# Patient Record
Sex: MALE | Race: ASIAN | NOT HISPANIC OR LATINO | Employment: OTHER | URBAN - METROPOLITAN AREA
[De-identification: names, ages, dates, MRNs, and addresses within clinical notes are randomized per-mention and may not be internally consistent; named-entity substitution may affect disease eponyms.]

---

## 2018-01-29 ENCOUNTER — OFFICE VISIT CONVERTED (OUTPATIENT)
Dept: CARDIOLOGY | Facility: CLINIC | Age: 73
End: 2018-01-29
Attending: INTERNAL MEDICINE

## 2018-01-29 ENCOUNTER — CONVERSION ENCOUNTER (OUTPATIENT)
Dept: CARDIOLOGY | Facility: CLINIC | Age: 73
End: 2018-01-29

## 2018-08-08 ENCOUNTER — OFFICE VISIT CONVERTED (OUTPATIENT)
Dept: CARDIOLOGY | Facility: CLINIC | Age: 73
End: 2018-08-08
Attending: INTERNAL MEDICINE

## 2019-03-12 ENCOUNTER — HOSPITAL ENCOUNTER (OUTPATIENT)
Dept: LAB | Facility: HOSPITAL | Age: 74
Discharge: HOME | End: 2019-03-12
Attending: INTERNAL MEDICINE

## 2019-03-12 LAB
ANION GAP SERPL CALC-SCNC: 20 MMOL/L (ref 8–19)
APPEARANCE UR: CLEAR
BASOPHILS # BLD AUTO: 0.04 10*3/UL (ref 0–0.2)
BASOPHILS NFR BLD AUTO: 0.6 % (ref 0–3)
BILIRUB UR QL: NEGATIVE
BUN SERPL-MCNC: 44 MG/DL (ref 5–25)
BUN/CREAT SERPL: 29 {RATIO} (ref 6–20)
CALCIUM SERPL-MCNC: 9.3 MG/DL (ref 8.7–10.4)
CHLORIDE SERPL-SCNC: 102 MMOL/L (ref 99–111)
COLOR UR: YELLOW
CONV ABS IMM GRAN: 0.03 10*3/UL (ref 0–0.2)
CONV BACTERIA: NEGATIVE
CONV CO2: 21 MMOL/L (ref 22–32)
CONV COLLECTION SOURCE (UA): ABNORMAL
CONV CREATININE URINE, RANDOM: 133.4 MG/DL (ref 10–300)
CONV HYALINE CASTS IN URINE MICRO: ABNORMAL /[LPF]
CONV IMMATURE GRAN: 0.5 % (ref 0–1.8)
CONV MICROALBUM.,U,RANDOM: 282.3 MG/L (ref 0–20)
CONV UROBILINOGEN IN URINE BY AUTOMATED TEST STRIP: 0.2 {EHRLICHU}/DL (ref 0.1–1)
CREAT UR-MCNC: 1.54 MG/DL (ref 0.7–1.2)
DEPRECATED RDW RBC AUTO: 44.6 FL (ref 35.1–43.9)
EOSINOPHIL # BLD AUTO: 0.22 10*3/UL (ref 0–0.7)
EOSINOPHIL # BLD AUTO: 3.5 % (ref 0–7)
ERYTHROCYTE [DISTWIDTH] IN BLOOD BY AUTOMATED COUNT: 13.8 % (ref 11.6–14.4)
GFR SERPLBLD BASED ON 1.73 SQ M-ARVRAT: 44 ML/MIN/{1.73_M2}
GLUCOSE SERPL-MCNC: 142 MG/DL (ref 70–99)
GLUCOSE UR QL: NEGATIVE MG/DL
HBA1C MFR BLD: 13.7 G/DL (ref 14–18)
HCT VFR BLD AUTO: 44.2 % (ref 42–52)
HGB UR QL STRIP: NEGATIVE
KETONES UR QL STRIP: NEGATIVE MG/DL
LEUKOCYTE ESTERASE UR QL STRIP: NEGATIVE
LYMPHOCYTES # BLD AUTO: 2.36 10*3/UL (ref 1–5)
MCH RBC QN AUTO: 27.7 PG (ref 27–31)
MCHC RBC AUTO-ENTMCNC: 31 G/DL (ref 33–37)
MCV RBC AUTO: 89.3 FL (ref 80–96)
MICROALBUMIN/CREAT UR: 211.6 MG/G{CRE} (ref 0–25)
MONOCYTES # BLD AUTO: 0.59 10*3/UL (ref 0.2–1.2)
MONOCYTES NFR BLD AUTO: 9.4 % (ref 3–10)
NEUTROPHILS # BLD AUTO: 3.07 10*3/UL (ref 2–8)
NEUTROPHILS NFR BLD AUTO: 48.6 % (ref 30–85)
NITRITE UR QL STRIP: NEGATIVE
NRBC CBCN: 0 % (ref 0–0.7)
OSMOLALITY SERPL CALC.SUM OF ELEC: 302 MOSM/KG (ref 273–304)
PH UR STRIP.AUTO: 5 [PH] (ref 5–8)
PLATELET # BLD AUTO: 135 10*3/UL (ref 130–400)
PMV BLD AUTO: 14.1 FL (ref 9.4–12.4)
POTASSIUM SERPL-SCNC: 4.3 MMOL/L (ref 3.5–5.3)
PROT UR QL: 30 MG/DL
RBC # BLD AUTO: 4.95 10*6/UL (ref 4.7–6.1)
RBC #/AREA URNS HPF: ABNORMAL /[HPF]
SODIUM SERPL-SCNC: 139 MMOL/L (ref 135–147)
SP GR UR: 1.02 (ref 1–1.03)
SQUAMOUS SPT QL MICRO: ABNORMAL /[HPF]
T4 FREE SERPL-MCNC: 1.5 NG/DL (ref 0.9–1.8)
TSH SERPL-ACNC: 5 M[IU]/L (ref 0.27–4.2)
URATE SERPL-MCNC: 3 MG/DL (ref 3.5–8.5)
VARIANT LYMPHS NFR BLD MANUAL: 37.4 % (ref 20–45)
WBC # BLD AUTO: 6.31 10*3/UL (ref 4.8–10.8)
WBC #/AREA URNS HPF: ABNORMAL /[HPF]

## 2019-06-17 ENCOUNTER — HOSPITAL ENCOUNTER (OUTPATIENT)
Dept: LAB | Facility: HOSPITAL | Age: 74
Discharge: HOME | End: 2019-06-17
Attending: INTERNAL MEDICINE

## 2019-06-17 LAB
25(OH)D3 SERPL-MCNC: 43.6 NG/ML (ref 30–100)
ALBUMIN SERPL-MCNC: 4.7 G/DL (ref 3.5–5)
ALBUMIN/GLOB SERPL: 1.6 {RATIO} (ref 1.4–2.6)
ALP SERPL-CCNC: 56 U/L (ref 56–155)
ALT SERPL-CCNC: 18 U/L (ref 10–40)
ANION GAP SERPL CALC-SCNC: 18 MMOL/L (ref 8–19)
APPEARANCE UR: CLEAR
AST SERPL-CCNC: 21 U/L (ref 15–50)
BASOPHILS # BLD AUTO: 0.05 10*3/UL (ref 0–0.2)
BASOPHILS NFR BLD AUTO: 1 % (ref 0–3)
BILIRUB SERPL-MCNC: 0.65 MG/DL (ref 0.2–1.3)
BILIRUB UR QL: NEGATIVE
BUN SERPL-MCNC: 30 MG/DL (ref 5–25)
BUN/CREAT SERPL: 23 {RATIO} (ref 6–20)
CALCIUM SERPL-MCNC: 10.1 MG/DL (ref 8.7–10.4)
CHLORIDE SERPL-SCNC: 104 MMOL/L (ref 99–111)
CHOLEST SERPL-MCNC: 105 MG/DL (ref 107–200)
CHOLEST/HDLC SERPL: 3.1 {RATIO} (ref 3–6)
COLOR UR: YELLOW
CONV ABS IMM GRAN: 0.01 10*3/UL (ref 0–0.2)
CONV CO2: 28 MMOL/L (ref 22–32)
CONV COLLECTION SOURCE (UA): NORMAL
CONV CREATININE URINE, RANDOM: 28.2 MG/DL (ref 10–300)
CONV IMMATURE GRAN: 0.2 % (ref 0–1.8)
CONV MICROALBUM.,U,RANDOM: 118.3 MG/L (ref 0–20)
CONV TOTAL PROTEIN: 7.6 G/DL (ref 6.3–8.2)
CONV UROBILINOGEN IN URINE BY AUTOMATED TEST STRIP: 0.2 {EHRLICHU}/DL (ref 0.1–1)
CREAT UR-MCNC: 1.29 MG/DL (ref 0.7–1.2)
DEPRECATED RDW RBC AUTO: 56.5 FL (ref 35.1–43.9)
EOSINOPHIL # BLD AUTO: 0.3 10*3/UL (ref 0–0.7)
EOSINOPHIL # BLD AUTO: 5.9 % (ref 0–7)
ERYTHROCYTE [DISTWIDTH] IN BLOOD BY AUTOMATED COUNT: 16.3 % (ref 11.6–14.4)
EST. AVERAGE GLUCOSE BLD GHB EST-MCNC: 123 MG/DL
GFR SERPLBLD BASED ON 1.73 SQ M-ARVRAT: 54 ML/MIN/{1.73_M2}
GLOBULIN UR ELPH-MCNC: 2.9 G/DL (ref 2–3.5)
GLUCOSE SERPL-MCNC: 181 MG/DL (ref 70–99)
GLUCOSE UR QL: NEGATIVE MG/DL
HBA1C MFR BLD: 11.6 G/DL (ref 14–18)
HBA1C MFR BLD: 5.9 % (ref 3.5–5.7)
HCT VFR BLD AUTO: 39.6 % (ref 42–52)
HDLC SERPL-MCNC: 34 MG/DL (ref 40–60)
HGB UR QL STRIP: NEGATIVE
KETONES UR QL STRIP: NEGATIVE MG/DL
LDLC SERPL CALC-MCNC: 28 MG/DL (ref 70–100)
LEUKOCYTE ESTERASE UR QL STRIP: NEGATIVE
LYMPHOCYTES # BLD AUTO: 1.62 10*3/UL (ref 1–5)
MCH RBC QN AUTO: 27.8 PG (ref 27–31)
MCHC RBC AUTO-ENTMCNC: 29.3 G/DL (ref 33–37)
MCV RBC AUTO: 95 FL (ref 80–96)
MICROALBUMIN/CREAT UR: 419.5 MG/G{CRE} (ref 0–25)
MONOCYTES # BLD AUTO: 0.44 10*3/UL (ref 0.2–1.2)
MONOCYTES NFR BLD AUTO: 8.7 % (ref 3–10)
NEUTROPHILS # BLD AUTO: 2.66 10*3/UL (ref 2–8)
NEUTROPHILS NFR BLD AUTO: 52.3 % (ref 30–85)
NITRITE UR QL STRIP: NEGATIVE
NRBC CBCN: 0 % (ref 0–0.7)
OSMOLALITY SERPL CALC.SUM OF ELEC: 313 MOSM/KG (ref 273–304)
PH UR STRIP.AUTO: 5 [PH] (ref 5–8)
PLATELET # BLD AUTO: 97 10*3/UL (ref 130–400)
PMV BLD AUTO: 13.8 FL (ref 9.4–12.4)
POTASSIUM SERPL-SCNC: 3.8 MMOL/L (ref 3.5–5.3)
PROT UR QL: NEGATIVE MG/DL
RBC # BLD AUTO: 4.17 10*6/UL (ref 4.7–6.1)
SODIUM SERPL-SCNC: 146 MMOL/L (ref 135–147)
SP GR UR: 1.01 (ref 1–1.03)
T4 FREE SERPL-MCNC: 1.2 NG/DL (ref 0.9–1.8)
TRIGL SERPL-MCNC: 213 MG/DL (ref 40–150)
TSH SERPL-ACNC: 3.74 M[IU]/L (ref 0.27–4.2)
URATE SERPL-MCNC: 2.5 MG/DL (ref 3.5–8.5)
VARIANT LYMPHS NFR BLD MANUAL: 31.9 % (ref 20–45)
VLDLC SERPL-MCNC: 43 MG/DL (ref 5–37)
WBC # BLD AUTO: 5.08 10*3/UL (ref 4.8–10.8)

## 2019-06-19 LAB
ALBUMIN SERPL-MCNC: 4.1 G/DL (ref 2.9–4.4)
ALBUMIN/GLOB SERPL: 1.2 {RATIO} (ref 0.7–1.7)
ALPHA2 GLOB SERPL ELPH-MCNC: 0.9 G/DL (ref 0.4–1)
BETA GLOBULIN: 1.1 G/DL (ref 0.7–1.3)
CONV ALPHA-1-GLOBULIN: 0.2 G/DL (ref 0–0.4)
CONV IMMUNOGLOBULIN G (IGG): 1017 MG/DL (ref 700–1600)
CONV IMMUNOGLOBULIN M (IGM): 34 MG/DL (ref 15–143)
CONV PE INTERPRETATION: NORMAL
CONV PE NOTE: NORMAL
CONV TOTAL PROTEIN: 7.5 G/DL (ref 6–8.5)
GAMMA GLOB SERPL ELPH-MCNC: 1.2 G/DL (ref 0.4–1.8)
GLOBULIN UR ELPH-MCNC: 3.4 G/DL (ref 2.2–3.9)
IGA SERPL-MCNC: 169 MG/DL (ref 61–437)
M-SPIKE: NORMAL G/DL
PROT PATTERN SERPL IFE-IMP: NORMAL

## 2019-07-22 ENCOUNTER — HOSPITAL ENCOUNTER (OUTPATIENT)
Dept: LAB | Facility: HOSPITAL | Age: 74
Discharge: HOME | End: 2019-07-22
Attending: INTERNAL MEDICINE

## 2019-07-22 LAB
ALBUMIN SERPL-MCNC: 4.5 G/DL (ref 3.5–5)
ALBUMIN/GLOB SERPL: 1.6 {RATIO} (ref 1.4–2.6)
ALP SERPL-CCNC: 54 U/L (ref 56–155)
ALT SERPL-CCNC: 22 U/L (ref 10–40)
ANION GAP SERPL CALC-SCNC: 18 MMOL/L (ref 8–19)
AST SERPL-CCNC: 26 U/L (ref 15–50)
BASOPHILS # BLD AUTO: 0.04 10*3/UL (ref 0–0.2)
BASOPHILS NFR BLD AUTO: 0.7 % (ref 0–3)
BILIRUB SERPL-MCNC: 0.62 MG/DL (ref 0.2–1.3)
BUN SERPL-MCNC: 18 MG/DL (ref 5–25)
BUN/CREAT SERPL: 13 {RATIO} (ref 6–20)
CALCIUM SERPL-MCNC: 9.5 MG/DL (ref 8.7–10.4)
CHLORIDE SERPL-SCNC: 107 MMOL/L (ref 99–111)
CONV ABS IMM GRAN: 0.02 10*3/UL (ref 0–0.2)
CONV CO2: 22 MMOL/L (ref 22–32)
CONV IMMATURE GRAN: 0.3 % (ref 0–1.8)
CONV TOTAL PROTEIN: 7.3 G/DL (ref 6.3–8.2)
CREAT UR-MCNC: 1.34 MG/DL (ref 0.7–1.2)
DEPRECATED RDW RBC AUTO: 50.1 FL (ref 35.1–43.9)
EOSINOPHIL # BLD AUTO: 0.25 10*3/UL (ref 0–0.7)
EOSINOPHIL # BLD AUTO: 4.2 % (ref 0–7)
ERYTHROCYTE [DISTWIDTH] IN BLOOD BY AUTOMATED COUNT: 15.4 % (ref 11.6–14.4)
GFR SERPLBLD BASED ON 1.73 SQ M-ARVRAT: 52 ML/MIN/{1.73_M2}
GLOBULIN UR ELPH-MCNC: 2.8 G/DL (ref 2–3.5)
GLUCOSE SERPL-MCNC: 84 MG/DL (ref 70–99)
HBA1C MFR BLD: 11.1 G/DL (ref 14–18)
HCT VFR BLD AUTO: 35.5 % (ref 42–52)
LYMPHOCYTES # BLD AUTO: 1.75 10*3/UL (ref 1–5)
MCH RBC QN AUTO: 28.1 PG (ref 27–31)
MCHC RBC AUTO-ENTMCNC: 31.3 G/DL (ref 33–37)
MCV RBC AUTO: 89.9 FL (ref 80–96)
MONOCYTES # BLD AUTO: 0.54 10*3/UL (ref 0.2–1.2)
MONOCYTES NFR BLD AUTO: 9.1 % (ref 3–10)
NEUTROPHILS # BLD AUTO: 3.32 10*3/UL (ref 2–8)
NEUTROPHILS NFR BLD AUTO: 56.1 % (ref 30–85)
NRBC CBCN: 0 % (ref 0–0.7)
OSMOLALITY SERPL CALC.SUM OF ELEC: 297 MOSM/KG (ref 273–304)
PLATELET # BLD AUTO: 94 10*3/UL (ref 130–400)
PMV BLD AUTO: ABNORMAL FL (ref 9.4–12.4)
POTASSIUM SERPL-SCNC: 4.1 MMOL/L (ref 3.5–5.3)
RBC # BLD AUTO: 3.95 10*6/UL (ref 4.7–6.1)
SODIUM SERPL-SCNC: 143 MMOL/L (ref 135–147)
VARIANT LYMPHS NFR BLD MANUAL: 29.6 % (ref 20–45)
WBC # BLD AUTO: 5.92 10*3/UL (ref 4.8–10.8)

## 2019-07-23 LAB
ALBUMIN SERPL-MCNC: 3.8 G/DL (ref 2.9–4.4)
ALBUMIN/GLOB SERPL: 1.2 {RATIO} (ref 0.7–1.7)
ALPHA2 GLOB SERPL ELPH-MCNC: 0.8 G/DL (ref 0.4–1)
BETA GLOBULIN: 1 G/DL (ref 0.7–1.3)
CONV ALPHA-1-GLOBULIN: 0.2 G/DL (ref 0–0.4)
CONV IMMUNOGLOBULIN G (IGG): 1005 MG/DL (ref 700–1600)
CONV IMMUNOGLOBULIN M (IGM): 34 MG/DL (ref 15–143)
CONV PE INTERPRETATION: NORMAL
CONV PE NOTE: NORMAL
CONV TOTAL PROTEIN: 6.9 G/DL (ref 6–8.5)
GAMMA GLOB SERPL ELPH-MCNC: 1.1 G/DL (ref 0.4–1.8)
GLOBULIN UR ELPH-MCNC: 3.1 G/DL (ref 2.2–3.9)
IGA SERPL-MCNC: 166 MG/DL (ref 61–437)
M-SPIKE: NORMAL G/DL
PROT PATTERN SERPL IFE-IMP: NORMAL

## 2019-08-28 ENCOUNTER — OFFICE VISIT CONVERTED (OUTPATIENT)
Dept: CARDIOLOGY | Facility: CLINIC | Age: 74
End: 2019-08-28
Attending: INTERNAL MEDICINE

## 2020-04-29 ENCOUNTER — TELEPHONE CONVERTED (OUTPATIENT)
Dept: GASTROENTEROLOGY | Facility: CLINIC | Age: 75
End: 2020-04-29
Attending: INTERNAL MEDICINE

## 2020-05-22 ENCOUNTER — HOSPITAL ENCOUNTER (OUTPATIENT)
Dept: LAB | Facility: HOSPITAL | Age: 75
Discharge: HOME | End: 2020-05-22
Attending: INTERNAL MEDICINE

## 2020-05-22 LAB
25(OH)D3 SERPL-MCNC: 35.7 NG/ML (ref 30–100)
ALBUMIN SERPL-MCNC: 4.3 G/DL (ref 3.5–5)
ALBUMIN/GLOB SERPL: 1.7 {RATIO} (ref 1.4–2.6)
ALP SERPL-CCNC: 53 U/L (ref 56–155)
ALT SERPL-CCNC: 17 U/L (ref 10–40)
ANION GAP SERPL CALC-SCNC: 16 MMOL/L (ref 8–19)
APPEARANCE UR: CLEAR
AST SERPL-CCNC: 18 U/L (ref 15–50)
BASOPHILS # BLD AUTO: 0.03 10*3/UL (ref 0–0.2)
BASOPHILS NFR BLD AUTO: 0.5 % (ref 0–3)
BILIRUB SERPL-MCNC: 0.57 MG/DL (ref 0.2–1.3)
BILIRUB UR QL: NEGATIVE
BUN SERPL-MCNC: 30 MG/DL (ref 5–25)
BUN/CREAT SERPL: 17 {RATIO} (ref 6–20)
CALCIUM SERPL-MCNC: 9.8 MG/DL (ref 8.7–10.4)
CHLORIDE SERPL-SCNC: 109 MMOL/L (ref 99–111)
CK SERPL-CCNC: 61 U/L (ref 57–374)
COLOR UR: YELLOW
CONV ABS IMM GRAN: 0.03 10*3/UL (ref 0–0.2)
CONV BACTERIA: NEGATIVE
CONV CO2: 21 MMOL/L (ref 22–32)
CONV COLLECTION SOURCE (UA): ABNORMAL
CONV CREATININE URINE, RANDOM: 52 MG/DL (ref 10–300)
CONV HYALINE CASTS IN URINE MICRO: ABNORMAL /[LPF]
CONV IMMATURE GRAN: 0.5 % (ref 0–1.8)
CONV MICROALBUM.,U,RANDOM: 584.9 MG/L (ref 0–20)
CONV TOTAL PROTEIN: 6.9 G/DL (ref 6.3–8.2)
CONV UROBILINOGEN IN URINE BY AUTOMATED TEST STRIP: 0.2 {EHRLICHU}/DL (ref 0.1–1)
CREAT UR-MCNC: 1.76 MG/DL (ref 0.7–1.2)
DEPRECATED RDW RBC AUTO: 50.8 FL (ref 35.1–43.9)
EOSINOPHIL # BLD AUTO: 0.24 10*3/UL (ref 0–0.7)
EOSINOPHIL # BLD AUTO: 4 % (ref 0–7)
ERYTHROCYTE [DISTWIDTH] IN BLOOD BY AUTOMATED COUNT: 14.7 % (ref 11.6–14.4)
EST. AVERAGE GLUCOSE BLD GHB EST-MCNC: 148 MG/DL
GFR SERPLBLD BASED ON 1.73 SQ M-ARVRAT: 37 ML/MIN/{1.73_M2}
GLOBULIN UR ELPH-MCNC: 2.6 G/DL (ref 2–3.5)
GLUCOSE SERPL-MCNC: 95 MG/DL (ref 70–99)
GLUCOSE UR QL: NEGATIVE MG/DL
HBA1C MFR BLD: 6.8 % (ref 3.5–5.7)
HCT VFR BLD AUTO: 39.7 % (ref 42–52)
HGB BLD-MCNC: 12.1 G/DL (ref 14–18)
HGB UR QL STRIP: NEGATIVE
KETONES UR QL STRIP: NEGATIVE MG/DL
LEUKOCYTE ESTERASE UR QL STRIP: NEGATIVE
LYMPHOCYTES # BLD AUTO: 2.51 10*3/UL (ref 1–5)
LYMPHOCYTES NFR BLD AUTO: 41.7 % (ref 20–45)
MCH RBC QN AUTO: 29 PG (ref 27–31)
MCHC RBC AUTO-ENTMCNC: 30.5 G/DL (ref 33–37)
MCV RBC AUTO: 95.2 FL (ref 80–96)
MICROALBUMIN/CREAT UR: 1124.8 MG/G{CRE} (ref 0–25)
MONOCYTES # BLD AUTO: 0.44 10*3/UL (ref 0.2–1.2)
MONOCYTES NFR BLD AUTO: 7.3 % (ref 3–10)
NEUTROPHILS # BLD AUTO: 2.77 10*3/UL (ref 2–8)
NEUTROPHILS NFR BLD AUTO: 46 % (ref 30–85)
NITRITE UR QL STRIP: NEGATIVE
NRBC CBCN: 0 % (ref 0–0.7)
OSMOLALITY SERPL CALC.SUM OF ELEC: 298 MOSM/KG (ref 273–304)
PH UR STRIP.AUTO: 5 [PH] (ref 5–8)
PLATELET # BLD AUTO: 110 10*3/UL (ref 130–400)
PMV BLD AUTO: 13 FL (ref 9.4–12.4)
POTASSIUM SERPL-SCNC: 4.6 MMOL/L (ref 3.5–5.3)
PROT UR QL: 100 MG/DL
PSA SERPL-MCNC: 1.22 NG/ML (ref 0–4)
RBC # BLD AUTO: 4.17 10*6/UL (ref 4.7–6.1)
RBC #/AREA URNS HPF: ABNORMAL /[HPF]
SODIUM SERPL-SCNC: 141 MMOL/L (ref 135–147)
SP GR UR: 1.01 (ref 1–1.03)
T4 FREE SERPL-MCNC: 1.1 NG/DL (ref 0.9–1.8)
TSH SERPL-ACNC: 5.04 M[IU]/L (ref 0.27–4.2)
URATE SERPL-MCNC: 2.7 MG/DL (ref 3.5–8.5)
WBC # BLD AUTO: 6.02 10*3/UL (ref 4.8–10.8)
WBC #/AREA URNS HPF: ABNORMAL /[HPF]

## 2020-05-26 LAB
ALBUMIN SERPL-MCNC: 3.8 G/DL (ref 2.9–4.4)
ALBUMIN/GLOB SERPL: 1.3 {RATIO} (ref 0.7–1.7)
ALPHA2 GLOB SERPL ELPH-MCNC: 0.9 G/DL (ref 0.4–1)
BETA GLOBULIN: 0.8 G/DL (ref 0.7–1.3)
CONV ALPHA-1-GLOBULIN: 0.2 G/DL (ref 0–0.4)
CONV IMMUNOGLOBULIN G (IGG): 980 MG/DL (ref 603–1613)
CONV IMMUNOGLOBULIN M (IGM): 35 MG/DL (ref 15–143)
CONV PE INTERPRETATION: NORMAL
CONV PE NOTE: NORMAL
CONV TOTAL PROTEIN: 6.7 G/DL (ref 6–8.5)
GAMMA GLOB SERPL ELPH-MCNC: 0.9 G/DL (ref 0.4–1.8)
GLOBULIN UR ELPH-MCNC: 2.9 G/DL (ref 2.2–3.9)
IGA SERPL-MCNC: 157 MG/DL (ref 61–437)
M-SPIKE: NORMAL G/DL
PROT PATTERN SERPL IFE-IMP: NORMAL
SARS-COV-2 RNA SPEC QL NAA+PROBE: NOT DETECTED

## 2020-05-28 ENCOUNTER — HOSPITAL ENCOUNTER (OUTPATIENT)
Dept: GASTROENTEROLOGY | Facility: HOSPITAL | Age: 75
Setting detail: HOSPITAL OUTPATIENT SURGERY
Discharge: HOME | End: 2020-05-28
Attending: INTERNAL MEDICINE

## 2020-05-28 LAB — GLUCOSE BLD-MCNC: 91 MG/DL (ref 70–99)

## 2020-06-08 ENCOUNTER — CONVERSION ENCOUNTER (OUTPATIENT)
Dept: CARDIOLOGY | Facility: CLINIC | Age: 75
End: 2020-06-08

## 2020-06-08 ENCOUNTER — OFFICE VISIT CONVERTED (OUTPATIENT)
Dept: CARDIOLOGY | Facility: CLINIC | Age: 75
End: 2020-06-08
Attending: INTERNAL MEDICINE

## 2020-06-16 ENCOUNTER — HOSPITAL ENCOUNTER (OUTPATIENT)
Dept: LAB | Facility: HOSPITAL | Age: 75
Discharge: HOME | End: 2020-06-16
Attending: INTERNAL MEDICINE

## 2020-06-16 LAB
ALBUMIN SERPL-MCNC: 4.3 G/DL (ref 3.5–5)
ALBUMIN/GLOB SERPL: 1.6 {RATIO} (ref 1.4–2.6)
ALP SERPL-CCNC: 47 U/L (ref 56–155)
ALT SERPL-CCNC: 18 U/L (ref 10–40)
ANION GAP SERPL CALC-SCNC: 17 MMOL/L (ref 8–19)
AST SERPL-CCNC: 19 U/L (ref 15–50)
BILIRUB SERPL-MCNC: 0.45 MG/DL (ref 0.2–1.3)
BUN SERPL-MCNC: 29 MG/DL (ref 5–25)
BUN/CREAT SERPL: 18 {RATIO} (ref 6–20)
CALCIUM SERPL-MCNC: 9.6 MG/DL (ref 8.7–10.4)
CHLORIDE SERPL-SCNC: 113 MMOL/L (ref 99–111)
CHOLEST SERPL-MCNC: 100 MG/DL (ref 107–200)
CHOLEST/HDLC SERPL: 2.8 {RATIO} (ref 3–6)
CONV CO2: 20 MMOL/L (ref 22–32)
CONV TOTAL PROTEIN: 7 G/DL (ref 6.3–8.2)
CREAT UR-MCNC: 1.58 MG/DL (ref 0.7–1.2)
GFR SERPLBLD BASED ON 1.73 SQ M-ARVRAT: 42 ML/MIN/{1.73_M2}
GLOBULIN UR ELPH-MCNC: 2.7 G/DL (ref 2–3.5)
GLUCOSE SERPL-MCNC: 106 MG/DL (ref 70–99)
HDLC SERPL-MCNC: 36 MG/DL (ref 40–60)
LDLC SERPL CALC-MCNC: 47 MG/DL (ref 70–100)
OSMOLALITY SERPL CALC.SUM OF ELEC: 306 MOSM/KG (ref 273–304)
POTASSIUM SERPL-SCNC: 4.8 MMOL/L (ref 3.5–5.3)
SODIUM SERPL-SCNC: 145 MMOL/L (ref 135–147)
TRIGL SERPL-MCNC: 86 MG/DL (ref 40–150)
VLDLC SERPL-MCNC: 17 MG/DL (ref 5–37)

## 2021-05-10 NOTE — PROCEDURES
"   Procedure Note      Patient Name: Nelson Wang   Patient ID: [id number]   Sex: Male   YOB: 1945    Primary Care Provider: Kris Wang MD   Referring Provider: Kris Wang MD    Visit Date: June 8, 2020    Provider: Beba Grant MD   Location: [facility]   Location Address: [address] [place] [address], [address]   [place]  [id number]   Location Phone: [phone]          FINAL REPORT   TRANSTHORACIC ECHOCARDIOGRAM REPORT    Diagnosis: Old myocardial infarction.   Height: 5'6\" Weight: 160 B/P: BLOOD PRESSURE BSA: 1.8   Tech: BNS   MEASUREMENTS:  RVID (Diastole) : RVID. (NORMAL: 0.7 to 2.4 cm max)   LVID (Systole): 2.5 cm (Diastole): 5.2 cm . (NORMAL: 3.7 - 5.4 cm)   Posterior Wall Thickness (Diastole): 1.0 cm. (NORMAL: 0.8 - 1.1 cm)   Septal Thickness (Diastole): 1.0 cm. (NORMAL: 0.7 - 1.2 cm)   LAID (Systole): 3.1 cm. (NORMAL: 1.9 - 3.8 cm)   Aortic Root Diameter (Diastole): 3.3 cm. (NORMAL: 2.0 - 3.7 cm)   COMMENTS:  COMMENT: The patient underwent 2-D, M-Mode, and Doppler examination, including pulse-wave, continuous-wave, and color-flow Doppler analysis; the study is technically difficult. The following was observed:   FINDINGS:  MITRAL VALVE: Mild fibrocalcific changes noted.   AORTIC VALVE: Normal with three cusps. Normal central closure. No evidence of any obstruction.   TRICUSPID VALVE: Normal.   PULMONIC VALVE: Not well seen.   LEFT ATRIUM: Normal; no masses seen. LA volume is 15 mL/m2.   AORTIC ROOT: Normal in size and motion.   LEFT VENTRICLE: The left ventricular chamber size is normal. The left ventricular wall thickness is normal. The left ventricular systolic function is normal with an estimated EF of 55 to 60%. No significant regional wall motion abnormalities are identified.   RIGHT ATRIUM: Normal.   RIGHT VENTRICLE: Normal size and function.   PERICARDIUM: No effusion.   INFERIOR VENA CAVA: Diameter is 1.4 cm greater than 50% reduction with inspiration. "   DOPPLER: Pulse-wave, continuous wave, and color-flow Doppler evaluation was performed. E/A ratio is 0.7. DT= 274 msec. IVRT is 84 msec. E/E' is 10. There is trace mitral valve regurgitation present.   Faxed: 06/08/2020      CONCLUSION:  1.  Technically limited echocardiographic study.   2.  Normal left ventricular chamber size with normal left ventricular systolic function with an estimated ejection        fraction of 55 to 60%.  No definite regional wall motion abnormalities are identified.   3.  Trace mitral valve regurgitation of no hemodynamic consequence.   4.  Grade 1 left ventricular diastolic dysfunction.      Beba Grant MD, FACC  PM/pap    This note was transcribed by Coral Granados.  pap/pm  The above service was transcribed by Coral Granados, and I attest to the accuracy of the note.  PM                 Electronically Signed by: Renetta Granados-, Other -Author on June 9, 2020 09:19:35 AM  Electronically Co-signed by: Beba Grant MD -Reviewer on June 21, 2020 12:00:05 AM

## 2021-05-12 NOTE — PROGRESS NOTES
Quick Note      Patient Name: Nelson Wang   Patient ID: [id number]   Sex: Male   YOB: 1945    Primary Care Provider: Kris Wang MD   Referring Provider: Kris Wang MD    Visit Date: April 29, 2020    Provider: Maximino Peraza MD   [place] [facility]   [place] Address: [address] [place] [address]  [id number]   Location Phone: [phone]          History Of Present Illness  TELEHEALTH TELEPHONE VISIT  Chief Complaint: PT states he has a change in his bowel movement, they are black and brown. he think he needs a colonoscopy. some bloating. no other issue at this time.   Nelson Wang is a 75 year old  male who is presenting for evaluation via telehealth telephone visit. Verbal consent obtained before beginning visit.   Provider spent 15 minutes minutes with the patient during telehealth visit.   The following staff were present during this visit: Sheela ONEIL, Edwar Green MA, Mary Jane Vazquez MA, Dr. 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  He has never had a colonoscopy before about 8 months ago he did had Cologuard test which was negative.  For last few months patient has noticed that he has been having episodes of black tarry maroonish stools at times.  Also is noticed a change in bowel habits he used to have loose stools in the past now his stools are getting firmer and occasionally gets constipated.  He denies any abdominal pain nausea vomiting heartburn dysphagia.  There is no weakness numbness passing out chest pain or shortness of breath.  Patient has no abdominal pain.    Patient's sister who is a year older than him recently got diagnosed with adenocarcinoma of the colon and patient is concerned that he wants to get checked by colonoscopy           Assessment  · Change in bowel habits     787.99/R19.4  Loose Stools  · Family history  of colon cancer     V16.0/Z80.0  Sister was recently diagnosed  · Dark stools     792.1/R19.5  · Melena     578.1/K92.1      Plan  · Medications  o NuLYTELY with Flavor Packs 420 gram oral recon soln   SIG: take as directed for 1 day   DISP: (1) 4000 ml bottle with 0 refills  Prescribed on 04/29/2020     o Transition of Care or Provider Policy  · Instructions  o Plan Of Care:      1. EGD/Colonoscopy - May                 Electronically Signed by: Maximino Peraza MD -Author on April 29, 2020 05:00:19 PM

## 2021-05-13 NOTE — PROGRESS NOTES
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 He denies any chest pain, shortness of breath, PND, orthopnea, palpitations, dizziness, or syncope. e does not exercise on a regular basis. He states that he works in his yard and his garden a couple of hours a day. I have encouraged him to do aerobic conditioning exercises.   PAST MEDICAL HISTORY: Hypertension; chronic kidney disease; hyperlipidemia; hypothyroidism; diabetes mellitus.   FAMILY HISTORY: Positive for diabetes and hypertension. Negative for heart disease.   PSYCHOSOCIAL HISTORY: Positive for history of mood change or depression. The patient drinks a moderate amount of alcohol. He previously smoked but quit.   CURRENT MEDICATIONS: include Lipitor 40 mg daily; Carvedilol 25 mg b.i.d.; Lasix 40 mg daily; Synthroid 75 mcg daily; Lantus insulin 30 units daily; Metformin 1000 mg b.i.d.; Allopurinol 200 mg b.i.d.; Klonopin 0.5 mg at bedtime; Sertraline 100 mg daily; vitamin C; multivitamin; fish oil; Prevagen; Iron Plus; etc. The dosage and frequency of the medications were reviewed with the patient.       Review of Systems  · Cardiovascular  o Denies  o : palpitations (fast, fluttering, or skipping beats), swelling (feet, ankles, hands), shortness of breath  "while walking or lying flat, chest pain or angina pectoris   · Respiratory  o Denies  o : chronic or frequent cough, asthma or wheezing      Vitals  Date Time BP Position Site L\R Cuff Size HR RR TEMP (F) WT  HT  BMI kg/m2 BSA m2 O2 Sat HC       06/08/2020 09:54 /58 Sitting    62 - R   160lbs 0oz 5'  6\" 25.82 1.84           Physical Examination  · Constitutional  o Appearance  o : Awake, alert, in no acute distress.  · Eyes  o Conjunctivae  o : Conjunctivae normal.  · Ears, Nose, Mouth and Throat  o Oral Cavity  o :   § Oral Mucosa  § : Normal.  · Neck  o Inspection/Palpation/Auscultation  o : No lymphadenopathy. No JVD. No bruit. Good carotid upstroke.  · Respiratory  o Respiratory  o : Clear to percussion and auscultation. Good respiratory effort.  · Cardiovascular  o Heart  o : PMI is not well felt. S1, S2 are normal. No S3. No S4.  o Peripheral Vascular System  o :   § Extremities  § : Good femoral and pedal pulses. Trace pedal edema.   · Gastrointestinal  o Abdominal Examination  o : Soft. No masses or tenderness felt. No hepatosplenomegaly. Abdominal aorta is not palpable.  · Labs  o Labs  o : Most recent CBC is normal except for slightly low hemoglobin of 12.1. Chemistry panel showed BUN 30, creatinine 1.76, GFR 37 which is slightly worse. A1c 6.8%. Electrolytes were normal. He did not get a lipid panel.      The results of the echocardiogram were reviewed with the patient.  They show normal LV function with trivial mitral regurgitation.           Assessment     1.  Hypertensive heart disease, good control.   2.  Chronic kidney disease, stage 3, slightly worse.  3.  Diabetes mellitus.   4.  Hyperlipidemia.   5.  Hypothyroidism.       Plan     1.  He has been encouraged to walk 30 to 40 minutes five days a week.   2.  He will get CMP and a lipid panel in two to three weeks when he is due to get his blood work per his        nephrologist.    3.  Encouraged him to lose weight.   4.  Follow up in six to " eight months, or earlier if necessary.      Beba Grant MD, [facility]  PM/pap    This note was transcribed by Coral Granados.  pap/pm  The above service was transcribed by Coral Granados, and I attest to the accuracy of the note.  PM             Electronically Signed by: Renetta Graandos-, Other -Author on June 9, 2020 10:00:45 AM  Electronically Co-signed by: Beba Grant MD -Reviewer on June 20, 2020 10:11:11 PM

## 2021-05-15 VITALS
DIASTOLIC BLOOD PRESSURE: 58 MMHG | HEIGHT: 66 IN | BODY MASS INDEX: 25.71 KG/M2 | WEIGHT: 160 LBS | SYSTOLIC BLOOD PRESSURE: 132 MMHG | HEART RATE: 62 BPM

## 2021-05-15 VITALS
DIASTOLIC BLOOD PRESSURE: 60 MMHG | SYSTOLIC BLOOD PRESSURE: 104 MMHG | HEART RATE: 70 BPM | HEIGHT: 66 IN | WEIGHT: 162 LBS | BODY MASS INDEX: 26.03 KG/M2

## 2021-05-16 VITALS
HEIGHT: 66 IN | HEART RATE: 70 BPM | DIASTOLIC BLOOD PRESSURE: 74 MMHG | BODY MASS INDEX: 26.03 KG/M2 | SYSTOLIC BLOOD PRESSURE: 120 MMHG | WEIGHT: 162 LBS

## 2021-05-16 VITALS
HEIGHT: 66 IN | DIASTOLIC BLOOD PRESSURE: 58 MMHG | BODY MASS INDEX: 25.88 KG/M2 | SYSTOLIC BLOOD PRESSURE: 96 MMHG | HEART RATE: 58 BPM | WEIGHT: 161 LBS

## 2021-06-22 RX ORDER — CARVEDILOL 25 MG/1
25 TABLET ORAL 2 TIMES DAILY WITH MEALS
COMMUNITY
End: 2021-06-22 | Stop reason: SDUPTHER

## 2021-06-22 RX ORDER — CARVEDILOL 25 MG/1
25 TABLET ORAL 2 TIMES DAILY WITH MEALS
Qty: 90 TABLET | Refills: 3 | Status: SHIPPED | OUTPATIENT
Start: 2021-06-22 | End: 2021-08-25

## 2021-06-22 RX ORDER — ATORVASTATIN CALCIUM 40 MG/1
40 TABLET, FILM COATED ORAL DAILY
COMMUNITY
End: 2021-06-22 | Stop reason: SDUPTHER

## 2021-06-22 RX ORDER — ATORVASTATIN CALCIUM 40 MG/1
40 TABLET, FILM COATED ORAL DAILY
Qty: 90 TABLET | Refills: 3 | Status: SHIPPED | OUTPATIENT
Start: 2021-06-22 | End: 2022-09-16 | Stop reason: SDUPTHER

## 2021-06-22 RX ORDER — CARVEDILOL 25 MG/1
TABLET ORAL
Qty: 180 TABLET | Refills: 0 | Status: SHIPPED | OUTPATIENT
Start: 2021-06-22 | End: 2021-06-22 | Stop reason: SDUPTHER

## 2021-06-22 RX ORDER — ATORVASTATIN CALCIUM 40 MG/1
TABLET, FILM COATED ORAL
Qty: 90 TABLET | Refills: 0 | Status: SHIPPED | OUTPATIENT
Start: 2021-06-22 | End: 2021-06-22 | Stop reason: SDUPTHER

## 2021-07-09 ENCOUNTER — OFFICE VISIT (OUTPATIENT)
Dept: OTOLARYNGOLOGY | Facility: CLINIC | Age: 76
End: 2021-07-09

## 2021-07-09 VITALS — WEIGHT: 156.4 LBS | TEMPERATURE: 97.4 F | BODY MASS INDEX: 24.55 KG/M2 | HEIGHT: 67 IN

## 2021-07-09 DIAGNOSIS — H91.93 BILATERAL HEARING LOSS, UNSPECIFIED HEARING LOSS TYPE: Primary | ICD-10-CM

## 2021-07-09 DIAGNOSIS — H60.502 ACUTE OTITIS EXTERNA OF LEFT EAR, UNSPECIFIED TYPE: ICD-10-CM

## 2021-07-09 PROCEDURE — 99203 OFFICE O/P NEW LOW 30 MIN: CPT | Performed by: NURSE PRACTITIONER

## 2021-07-09 PROCEDURE — 69210 REMOVE IMPACTED EAR WAX UNI: CPT | Performed by: NURSE PRACTITIONER

## 2021-07-09 RX ORDER — CIPROFLOXACIN AND DEXAMETHASONE 3; 1 MG/ML; MG/ML
3 SUSPENSION/ DROPS AURICULAR (OTIC) 3 TIMES DAILY
Qty: 7.5 ML | Refills: 0 | Status: SHIPPED | OUTPATIENT
Start: 2021-07-09 | End: 2021-07-16

## 2021-07-09 RX ORDER — FUROSEMIDE 40 MG/1
40 TABLET ORAL DAILY
COMMUNITY
End: 2021-09-07

## 2021-07-09 RX ORDER — ALLOPURINOL 100 MG/1
300 TABLET ORAL DAILY
COMMUNITY
End: 2023-03-07 | Stop reason: SDUPTHER

## 2021-07-09 RX ORDER — LEVOTHYROXINE SODIUM 0.07 MG/1
75 TABLET ORAL DAILY
COMMUNITY
End: 2021-07-28

## 2021-07-09 NOTE — PROGRESS NOTES
Patient Name: Nelson Wang   Visit Date: 07/09/2021   Patient ID: [id number]  Provider: BECKY Rogers    Sex: male  Location: [facility]   YOB: 1945  Location Address: [address] [place] [address], [address] [place] [address],?[place]?[id number]    Primary Care Provider Provider, No Known  Location Phone: [phone]    Referring Provider: No ref. provider found        Chief Complaint  Earache    Subjective   Neslon Wang is a 76 y.o. male who presents to [facility] today as a consult from No ref. provider found for evaluation of of his left ear.  Patient reports that over the past week he has had some pain in his left ear and feels like his hearing is decreased significantly.  He states he has had some drainage of the past 2 days.  He has been treated for a left-sided otitis externa infection with oral Augmentin.  He reports significant history of otosclerosis for which he has had 3 middle ear explorations and tympanoplasty surgeries.  I do not have any previous medical records.  He is currently wearing a hearing aid in his right ear.      Current Outpatient Medications on File Prior to Visit   Medication Sig   • allopurinol (ZYLOPRIM) 100 MG tablet Take 200 mg by mouth 2 (Two) Times a Day.   • atorvastatin (Lipitor) 40 MG tablet Take 1 tablet by mouth Daily.   • carvedilol (COREG) 25 MG tablet Take 1 tablet by mouth 2 (Two) Times a Day With Meals.   • furosemide (LASIX) 40 MG tablet Take 40 mg by mouth 2 (Two) Times a Day.   • levothyroxine (SYNTHROID, LEVOTHROID) 75 MCG tablet Take 75 mcg by mouth Daily.     No current facility-administered medications on file prior to visit.        Social History     Tobacco Use   • Smoking status: Former Smoker     Packs/day: 1.00     Types: Cigarettes   • Smokeless tobacco: Never Used   • Tobacco comment: quit 25 years ago, chews nicotine gum   Vaping Use   • Vaping Use: Never used   Substance Use Topics   • Alcohol use:  "Not on file   • Drug use: Not on file       Objective     Vital Signs:   Temp 97.4 °F (36.3 °C) (Temporal)   Ht 170.2 cm (67\")   Wt 70.9 kg (156 lb 6.4 oz)   BMI 24.50 kg/m²       Physical Exam  Constitutional:       General: He is not in acute distress.     Appearance: Normal appearance. He is not ill-appearing.   HENT:      Head: Normocephalic and atraumatic.      Jaw: There is normal jaw occlusion. No tenderness or pain on movement.      Salivary Glands: Right salivary gland is not diffusely enlarged or tender. Left salivary gland is not diffusely enlarged or tender.      Right Ear: Decreased hearing noted.      Left Ear: External ear normal. Decreased hearing noted. Drainage, swelling and tenderness present.      Ears:      Comments: Left ear canal with purulent otorrhea, suctioned under the microscope.  Ear canal is erythematous and mildly swollen.  Tympanic membrane appears thickened and erythematous.     Nose: Nose normal. No septal deviation.      Right Sinus: No maxillary sinus tenderness or frontal sinus tenderness.      Left Sinus: No maxillary sinus tenderness or frontal sinus tenderness.      Mouth/Throat:      Lips: Pink. No lesions.      Mouth: Mucous membranes are moist. No oral lesions.      Dentition: Normal dentition.      Tongue: No lesions.      Palate: No mass and lesions.      Pharynx: Oropharynx is clear. Uvula midline.      Tonsils: No tonsillar exudate.   Eyes:      Extraocular Movements: Extraocular movements intact.      Conjunctiva/sclera: Conjunctivae normal.      Pupils: Pupils are equal, round, and reactive to light.   Neck:      Thyroid: No thyroid mass, thyromegaly or thyroid tenderness.      Trachea: Trachea normal.   Pulmonary:      Effort: Pulmonary effort is normal. No respiratory distress.   Musculoskeletal:         General: Normal range of motion.      Cervical back: Normal range of motion and neck supple. No tenderness.   Lymphadenopathy:      Cervical: No cervical " adenopathy.   Skin:     General: Skin is warm and dry.   Neurological:      General: No focal deficit present.      Mental Status: He is alert and oriented to person, place, and time.      Cranial Nerves: No cranial nerve deficit.      Motor: No weakness.      Gait: Gait normal.   Psychiatric:         Mood and Affect: Mood normal.         Behavior: Behavior normal.         Thought Content: Thought content normal.         Judgment: Judgment normal.         Ear Cerumen Removal    Date/Time: 7/9/2021 10:00 AM  Performed by: Katerin Barber APRN  Authorized by: Katerin Barber APRN     Anesthesia:  Local Anesthetic: none  Location details: left ear  Comments: Purulent otorrhea suctioned out of the left ear under the microscope  Procedure type: instrumentation   Sedation:  Patient sedated: no             Result Review :              Assessment and Plan    Diagnoses and all orders for this visit:    1. Bilateral hearing loss, unspecified hearing loss type (Primary)    2. Acute otitis externa of left ear, unspecified type  -     ciprofloxacin-dexamethasone (CIPRODEX) 0.3-0.1 % otic suspension; Administer 3 drops into the left ear 3 (Three) Times a Day for 7 days.  Dispense: 7.5 mL; Refill: 0    Other orders  -     Ear Cerumen Removal    The patient reports that he has some Ciprodex drops at home.  I will have him use this 3 drops 3 times a day for 7 days.  I will have him return in 2 weeks at which time we will get an audiogram.  He has had a previous audiogram at [facility] performed 2 to 3 years ago.  I will try to get these records so we can compare.       Follow Up   Return in about 2 weeks (around 7/23/2021), or with audio.  Patient was given instructions and counseling regarding his condition or for health maintenance advice. Please see specific information pulled into the AVS if appropriate.      BECKY Rogers

## 2021-07-15 RX ORDER — LOSARTAN POTASSIUM 25 MG/1
25 TABLET ORAL EVERY OTHER DAY
Qty: 45 TABLET | Refills: 0 | Status: SHIPPED | OUTPATIENT
Start: 2021-07-15 | End: 2021-11-10

## 2021-07-15 NOTE — TELEPHONE ENCOUNTER
Patient was seen 6/8/2020. Next OV 8/25/2021. Patient states that he is taking the Losartan 1 tab every other day per Dr. Grant.

## 2021-07-26 ENCOUNTER — OFFICE VISIT (OUTPATIENT)
Dept: OTOLARYNGOLOGY | Facility: CLINIC | Age: 76
End: 2021-07-26

## 2021-07-26 ENCOUNTER — PROCEDURE VISIT (OUTPATIENT)
Dept: OTOLARYNGOLOGY | Facility: CLINIC | Age: 76
End: 2021-07-26

## 2021-07-26 VITALS — TEMPERATURE: 97.5 F | HEIGHT: 67 IN | BODY MASS INDEX: 24.67 KG/M2 | WEIGHT: 157.2 LBS

## 2021-07-26 DIAGNOSIS — H90.2 CONDUCTIVE HEARING LOSS, EXTERNAL EAR: ICD-10-CM

## 2021-07-26 DIAGNOSIS — H61.21 IMPACTED CERUMEN OF RIGHT EAR: ICD-10-CM

## 2021-07-26 DIAGNOSIS — H90.A31 MIXED CONDUCTIVE AND SENSORINEURAL HEARING LOSS OF RIGHT EAR WITH RESTRICTED HEARING OF LEFT EAR: ICD-10-CM

## 2021-07-26 DIAGNOSIS — H90.A22 SENSORINEURAL HEARING LOSS (SNHL) OF LEFT EAR WITH RESTRICTED HEARING OF RIGHT EAR: Primary | ICD-10-CM

## 2021-07-26 DIAGNOSIS — H90.5 HIGH FREQUENCY SENSORINEURAL HEARING LOSS OF RIGHT EAR: ICD-10-CM

## 2021-07-26 PROCEDURE — 92567 TYMPANOMETRY: CPT | Performed by: AUDIOLOGIST

## 2021-07-26 PROCEDURE — 92557 COMPREHENSIVE HEARING TEST: CPT | Performed by: AUDIOLOGIST

## 2021-07-26 PROCEDURE — 69210 REMOVE IMPACTED EAR WAX UNI: CPT | Performed by: NURSE PRACTITIONER

## 2021-07-26 PROCEDURE — 99213 OFFICE O/P EST LOW 20 MIN: CPT | Performed by: NURSE PRACTITIONER

## 2021-07-26 NOTE — PROGRESS NOTES
Patient Name: Nelson Wang   Visit Date: 07/26/2021   Patient ID: [id number]  Provider: BECKY Rogers    Sex: male  Location: [facility]   YOB: 1945  Location Address: [address] [place] [address], [address] [place] [address],?[place]?[id number]    Primary Care Provider Provider, No Known  Location Phone: [phone]    Referring Provider: No ref. provider found        Chief Complaint  Follow-up and Hearing Loss    Subjective          Nelson Wang is a 76 y.o. male who presents to [facility] for a follow-up visit of left-sided otitis externa, left otalgia and hearing loss.  He has significant medical history of otosclerosis for which she has had 3 middle ear explorations and tympanoplasty surgeries.  He reports today his ear is much improved.  He has no longer having left-sided otorrhea or pain.  He feels like his hearing is somewhat improved.  He reports that he only wears his hearing aid on the left side and has been wearing it recently.  He presents today for repeat audiogram.  We do have previous records of an audiogram performed in October 2020 from [facility].  This showed severe low-frequency mixed loss in the right ear with a severe sensorineural component from mid to higher frequencies.      Current Outpatient Medications on File Prior to Visit   Medication Sig   • allopurinol (ZYLOPRIM) 100 MG tablet Take 200 mg by mouth 2 (Two) Times a Day.   • atorvastatin (Lipitor) 40 MG tablet Take 1 tablet by mouth Daily.   • carvedilol (COREG) 25 MG tablet Take 1 tablet by mouth 2 (Two) Times a Day With Meals.   • furosemide (LASIX) 40 MG tablet Take 40 mg by mouth 2 (Two) Times a Day.   • levothyroxine (SYNTHROID, LEVOTHROID) 75 MCG tablet Take 75 mcg by mouth Daily.   • losartan (COZAAR) 25 MG tablet Take 1 tablet by mouth Every Other Day.     No current facility-administered medications on file prior to visit.        Social History     Tobacco Use   •  "Smoking status: Former Smoker     Packs/day: 1.00     Types: Cigarettes   • Smokeless tobacco: Never Used   • Tobacco comment: quit 25 years ago, chews nicotine gum   Vaping Use   • Vaping Use: Never used   Substance Use Topics   • Alcohol use: Not on file   • Drug use: Not on file        Objective     Vital Signs:   Temp 97.5 °F (36.4 °C) (Temporal)   Ht 170.2 cm (67\")   Wt 71.3 kg (157 lb 3.2 oz)   BMI 24.62 kg/m²       Physical Exam  Constitutional:       General: He is not in acute distress.     Appearance: Normal appearance. He is not ill-appearing.   HENT:      Head: Normocephalic and atraumatic.      Jaw: There is normal jaw occlusion. No tenderness or pain on movement.      Salivary Glands: Right salivary gland is not diffusely enlarged or tender. Left salivary gland is not diffusely enlarged or tender.      Right Ear: Tympanic membrane, ear canal and external ear normal. There is impacted cerumen.      Left Ear: Tympanic membrane, ear canal and external ear normal.      Nose: Nose normal. No septal deviation.      Right Sinus: No maxillary sinus tenderness or frontal sinus tenderness.      Left Sinus: No maxillary sinus tenderness or frontal sinus tenderness.      Mouth/Throat:      Lips: Pink. No lesions.      Mouth: Mucous membranes are moist. No oral lesions.      Dentition: Normal dentition.      Tongue: No lesions.      Palate: No mass and lesions.      Pharynx: Oropharynx is clear. Uvula midline.      Tonsils: No tonsillar exudate.   Eyes:      Extraocular Movements: Extraocular movements intact.      Conjunctiva/sclera: Conjunctivae normal.      Pupils: Pupils are equal, round, and reactive to light.   Neck:      Thyroid: No thyroid mass, thyromegaly or thyroid tenderness.      Trachea: Trachea normal.   Pulmonary:      Effort: Pulmonary effort is normal. No respiratory distress.   Musculoskeletal:         General: Normal range of motion.      Cervical back: Normal range of motion and neck supple. " No tenderness.   Lymphadenopathy:      Cervical: No cervical adenopathy.   Skin:     General: Skin is warm and dry.   Neurological:      General: No focal deficit present.      Mental Status: He is alert and oriented to person, place, and time.      Cranial Nerves: No cranial nerve deficit.      Motor: No weakness.      Gait: Gait normal.   Psychiatric:         Mood and Affect: Mood normal.         Behavior: Behavior normal.         Thought Content: Thought content normal.         Judgment: Judgment normal.          Ear Cerumen Removal    Date/Time: 7/26/2021 2:25 PM  Performed by: Katerin Barber APRN  Authorized by: Katerin Barber APRN   Location details: right ear  Patient tolerance: patient tolerated the procedure well with no immediate complications  Procedure type: instrumentation   Sedation:  Patient sedated: no             Result Review :               Assessment and Plan    Diagnoses and all orders for this visit:    1. Sensorineural hearing loss (SNHL) of left ear with restricted hearing of right ear (Primary)    2. Mixed conductive and sensorineural hearing loss of right ear with restricted hearing of left ear    3. Impacted cerumen of right ear    Other orders  -     Ear Cerumen Removal    Audiogram shows the right ear with a moderate to profound mixed hearing loss in the left ear with a mild to profound sensorineural hearing loss.  Speech reception thresholds at 60 dB on the right and 50 dB on the left.  Were discrimination scores were 64% on the right at 80 dB and 100% on the left at 80 dB.  Tympanograms were normal bilaterally.  Patient reports he feels like he is doing well with his current hearing aid in the left side.  Have given him copies of his audiogram and tympanogram.  He did have a right-sided cerumen impaction which we cleared today but no more signs of infection in his ear.  I will plan to see him back on an as-needed basis.       Follow Up   Return if symptoms worsen or fail to  improve.  Patient was given instructions and counseling regarding his condition or for health maintenance advice. Please see specific information pulled into the AVS if appropriate.     BECKY Rogers

## 2021-07-28 RX ORDER — LEVOTHYROXINE SODIUM 0.07 MG/1
TABLET ORAL
Qty: 90 TABLET | Refills: 0 | Status: SHIPPED | OUTPATIENT
Start: 2021-07-28 | End: 2021-10-27

## 2021-08-25 ENCOUNTER — OFFICE VISIT (OUTPATIENT)
Dept: CARDIOLOGY | Facility: CLINIC | Age: 76
End: 2021-08-25

## 2021-08-25 VITALS
HEART RATE: 50 BPM | HEIGHT: 66 IN | BODY MASS INDEX: 25.07 KG/M2 | DIASTOLIC BLOOD PRESSURE: 53 MMHG | WEIGHT: 156 LBS | SYSTOLIC BLOOD PRESSURE: 107 MMHG

## 2021-08-25 DIAGNOSIS — E78.5 HYPERLIPIDEMIA LDL GOAL <70: ICD-10-CM

## 2021-08-25 DIAGNOSIS — R00.1 BRADYCARDIA, SINUS: Primary | ICD-10-CM

## 2021-08-25 DIAGNOSIS — E03.9 HYPOTHYROIDISM (ACQUIRED): ICD-10-CM

## 2021-08-25 DIAGNOSIS — N18.4 CKD (CHRONIC KIDNEY DISEASE) STAGE 4, GFR 15-29 ML/MIN (HCC): ICD-10-CM

## 2021-08-25 DIAGNOSIS — I10 ESSENTIAL HYPERTENSION: ICD-10-CM

## 2021-08-25 PROCEDURE — 99214 OFFICE O/P EST MOD 30 MIN: CPT | Performed by: INTERNAL MEDICINE

## 2021-08-25 PROCEDURE — 93000 ELECTROCARDIOGRAM COMPLETE: CPT | Performed by: INTERNAL MEDICINE

## 2021-08-25 RX ORDER — CARVEDILOL 12.5 MG/1
12.5 TABLET ORAL 2 TIMES DAILY
Qty: 180 TABLET | Refills: 3 | Status: SHIPPED | OUTPATIENT
Start: 2021-08-25 | End: 2022-03-30 | Stop reason: SDUPTHER

## 2021-08-25 NOTE — ASSESSMENT & PLAN NOTE
Renal condition has gotten worse lately.  His creatinine is up to 2.5 which is unusual for him.  I have asked him to liberalize his fluid content reduce his carvedilol to get his blood pressure in the 120s systolic range rather than low 100s.  We will repeat his chemistry panel and thyroid panel in 1 month.  He follows up with Dr. Amaya

## 2021-08-25 NOTE — ASSESSMENT & PLAN NOTE
Lipid abnormalities are are supposedly at goal based on the readings he has told me that were done last month.  We will try to get the results.  He will continue on his strict low-cholesterol diet and atorvastatin 40 mg once a day bedtime

## 2021-08-25 NOTE — ASSESSMENT & PLAN NOTE
Hypertension is too tightly controlled.  Is running systolic pressure in the 100 systolic range.  I am going to cut down his carvedilol to 12.5 mg twice daily and he will do a 2-week blood pressure log starting Sunday.  In addition, I have asked him to take his losartan half a tablet a day instead of taking 1 tablet every other day.

## 2021-08-25 NOTE — PROGRESS NOTES
Chief Complaint  Hypertension and Hyperlipidemia    Subjective            Nelson Wang presents to [facility]  DrHanna Wang 76 years old gentleman with longstanding diabetes hypertension hyperlipidemia hypothyroidism and chronic kidney disease who is doing well.  He states that his blood pressure runs low and sometimes after prolonged working in the yard he will get a little dizzy.  He does drink fluids adequately but I have told him to liberalize his intake especially when he is working out in the ER on a hot day.  In addition, we need to make sure that his renal function improves.  It has deteriorated lately.      Past Medical History:   Diagnosis Date   • Diabetes (CMS/[facility])    • High cholesterol    • HL (hearing loss)    • Hypertension    • Hypothyroidism        No Known Allergies     Past Surgical History:   Procedure Laterality Date   • HIP BIPOLAR REPLACEMENT      Right        Social History     Tobacco Use   • Smoking status: Former Smoker     Packs/day: 1.00     Types: Cigarettes   • Smokeless tobacco: Never Used   • Tobacco comment: quit 25 years ago, chews nicotine gum   Vaping Use   • Vaping Use: Never used   Substance Use Topics   • Alcohol use: Never   • Drug use: Never       Family History   Problem Relation Age of Onset   • Cancer Sister 35   • Diabetes Sister    • Diabetes Mother    • Heart disease Father    • Diabetes Brother    • Heart disease Paternal Uncle         Prior to Admission medications    Medication Sig Start Date End Date Taking? Authorizing Provider   allopurinol (ZYLOPRIM) 100 MG tablet Take 200 mg by mouth 2 (Two) Times a Day.   Yes ProviderSerena MD   atorvastatin (Lipitor) 40 MG tablet Take 1 tablet by mouth Daily. 6/22/21  Yes Cynthia Bustillo APRN   furosemide (LASIX) 40 MG tablet Take 40 mg by mouth Daily.   Yes ProviderSerena MD   levothyroxine (SYNTHROID, LEVOTHROID) 75 MCG tablet Take 1 tablet by mouth once daily 7/28/21  Yes Juanita  "MD Beba   losartan (COZAAR) 25 MG tablet Take 1 tablet by mouth Every Other Day. 7/15/21  Yes Cynthia Bustillo APRN   carvedilol (COREG) 25 MG tablet Take 1 tablet by mouth 2 (Two) Times a Day With Meals. 6/22/21 8/25/21 Yes Cynthia Bustillo APRN   carvedilol (COREG) 12.5 MG tablet Take 1 tablet by mouth 2 (Two) Times a Day. 8/25/21   Beba Grant MD        Review of Systems   Respiratory: Negative for cough and shortness of breath.    Cardiovascular: Negative for chest pain, palpitations and leg swelling.   Neurological: Positive for dizziness.        Objective     /53   Pulse 50   Ht 167.6 cm (66\")   Wt 70.8 kg (156 lb)   BMI 25.18 kg/m²       Physical Exam  Constitutional:       General: He is awake.      Appearance: Normal appearance.   Neck:      Thyroid: No thyromegaly.      Vascular: No carotid bruit or JVD.   Cardiovascular:      Rate and Rhythm: Normal rate and regular rhythm.      Chest Wall: PMI is not displaced.      Pulses: Normal pulses.      Heart sounds: Normal heart sounds, S1 normal and S2 normal. No murmur heard.   No friction rub. No gallop. No S3 or S4 sounds.    Pulmonary:      Effort: Pulmonary effort is normal.      Breath sounds: Normal breath sounds and air entry. No wheezing, rhonchi or rales.   Abdominal:      General: Bowel sounds are normal.      Palpations: Abdomen is soft. There is no mass.      Tenderness: There is no abdominal tenderness.   Musculoskeletal:      Cervical back: Neck supple.      Right lower leg: No edema.      Left lower leg: No edema.   Neurological:      Mental Status: He is alert and oriented to person, place, and time.   Psychiatric:         Mood and Affect: Mood normal.         Behavior: Behavior is cooperative.         No results found for: PROBNP, BNP    CBC w/diff    CBC w/Diff 8/5/21   WBC 5.90   RBC 3.96 (A)   Hemoglobin 11.5 (A)   Hematocrit 36.5 (A)   MCV 92.2   MCH 29.0   MCHC 31.5   RDW 15.4   Platelets 107 (A)   Neutrophil Rel % " 50.2   Lymphocyte Rel % 38.8   Monocyte Rel % 7.3   Eosinophil Rel % 3.2   Basophil Rel % 0.5   (A) Abnormal value                Lab Results   Component Value Date    TSH 5.040 (H) 05/22/2020    TSH 3.740 06/17/2019    TSH 5.000 (H) 03/12/2019      Lab Results   Component Value Date    FREET4 1.1 05/22/2020    FREET4 1.2 06/17/2019    FREET4 1.5 03/12/2019      No results found for: DDIMERQUANT  No results found for: MG   No results found for: DIGOXIN        His most recent creatinine is 2.02 with an LDL of 53 and an A1c of 6.1% and the rest of his chemistry profile was normal  Result Review :                    ECG 12 Lead    Date/Time: 8/25/2021 12:42 PM  Performed by: Beba Grant MD  Authorized by: Beba Grant MD   Comparison: compared with previous ECG   Comments: Sinus bradycardia prolonged AZ interval borderline left axis deviation.  Abnormal R wave progression, late transition.  The heart rate is slower compared to the previous EKG                Assessment and Plan        Diagnoses and all orders for this visit:    1. Bradycardia, sinus (Primary)  Assessment & Plan:  His heart rate has been running low lately and along with the low blood pressure it may be contributing towards his worsening renal disease.  We will cut down his carvedilol and have him do a blood pressure and pulse log for 2 weeks starting Sunday    Orders:  -     ECG 12 Lead    2. Essential hypertension  Assessment & Plan:  Hypertension is too tightly controlled.  Is running systolic pressure in the 100 systolic range.  I am going to cut down his carvedilol to 12.5 mg twice daily and he will do a 2-week blood pressure log starting Sunday.  In addition, I have asked him to take his losartan half a tablet a day instead of taking 1 tablet every other day.    Orders:  -     Comprehensive Metabolic Panel; Future  -     Thyroid Panel With TSH; Future  -     Lipid Panel; Future  -     Comprehensive Metabolic Panel; Future  -     Magnesium;  Future  -     ECG 12 Lead    3. Hyperlipidemia LDL goal <70  Assessment & Plan:  Lipid abnormalities are are supposedly at goal based on the readings he has told me that were done last month.  We will try to get the results.  He will continue on his strict low-cholesterol diet and atorvastatin 40 mg once a day bedtime    Orders:  -     Comprehensive Metabolic Panel; Future  -     Thyroid Panel With TSH; Future  -     Lipid Panel; Future  -     Comprehensive Metabolic Panel; Future  -     Magnesium; Future  -     ECG 12 Lead    4. CKD (chronic kidney disease) stage 4, GFR 15-29 ml/min (CMS/[facility])  Assessment & Plan:  Renal condition has gotten worse lately.  His creatinine is up to 2.5 which is unusual for him.  I have asked him to liberalize his fluid content reduce his carvedilol to get his blood pressure in the 120s systolic range rather than low 100s.  We will repeat his chemistry panel and thyroid panel in 1 month.  He follows up with Dr. Amaya    Orders:  -     Comprehensive Metabolic Panel; Future  -     Thyroid Panel With TSH; Future  -     Lipid Panel; Future  -     Comprehensive Metabolic Panel; Future  -     Magnesium; Future  -     ECG 12 Lead    5. Hypothyroidism (acquired)  -     ECG 12 Lead    Other orders  -     carvedilol (COREG) 12.5 MG tablet; Take 1 tablet by mouth 2 (Two) Times a Day.  Dispense: 180 tablet; Refill: 3          Follow Up     Return in about 6 months (around 2/25/2022) for bwe in 1 mth and 6 mths.    Patient was given instructions and counseling regarding his condition or for health maintenance advice. Please see specific information pulled into the AVS if appropriate.

## 2021-08-25 NOTE — ASSESSMENT & PLAN NOTE
His heart rate has been running low lately and along with the low blood pressure it may be contributing towards his worsening renal disease.  We will cut down his carvedilol and have him do a blood pressure and pulse log for 2 weeks starting Sunday

## 2021-09-07 RX ORDER — FUROSEMIDE 40 MG/1
TABLET ORAL
Qty: 90 TABLET | Refills: 3 | Status: SHIPPED | OUTPATIENT
Start: 2021-09-07 | End: 2022-01-17

## 2021-09-07 NOTE — TELEPHONE ENCOUNTER
Patient was seen 8/25/2021. Medication correlates with progress note   What Is The Reason For Today's Visit?: Full Body Skin Examination What Is The Reason For Today's Visit? (Being Monitored For X): concerning skin lesions on an annual basis

## 2021-09-13 ENCOUNTER — TELEPHONE (OUTPATIENT)
Dept: CARDIOLOGY | Facility: CLINIC | Age: 76
End: 2021-09-13

## 2021-10-01 ENCOUNTER — LAB (OUTPATIENT)
Dept: LAB | Facility: HOSPITAL | Age: 76
End: 2021-10-01

## 2021-10-01 DIAGNOSIS — E78.5 HYPERLIPIDEMIA LDL GOAL <70: ICD-10-CM

## 2021-10-01 DIAGNOSIS — N18.4 CKD (CHRONIC KIDNEY DISEASE) STAGE 4, GFR 15-29 ML/MIN (HCC): ICD-10-CM

## 2021-10-01 DIAGNOSIS — I10 ESSENTIAL HYPERTENSION: ICD-10-CM

## 2021-10-01 LAB
ALBUMIN SERPL-MCNC: 4.4 G/DL (ref 3.5–5.2)
ALBUMIN/GLOB SERPL: 1.6 G/DL
ALP SERPL-CCNC: 60 U/L (ref 39–117)
ALT SERPL W P-5'-P-CCNC: 19 U/L (ref 1–41)
ANION GAP SERPL CALCULATED.3IONS-SCNC: 8.7 MMOL/L (ref 5–15)
AST SERPL-CCNC: 34 U/L (ref 1–40)
BILIRUB SERPL-MCNC: 0.4 MG/DL (ref 0–1.2)
BUN SERPL-MCNC: 37 MG/DL (ref 8–23)
BUN/CREAT SERPL: 17.5 (ref 7–25)
CALCIUM SPEC-SCNC: 9.7 MG/DL (ref 8.6–10.5)
CHLORIDE SERPL-SCNC: 107 MMOL/L (ref 98–107)
CO2 SERPL-SCNC: 23.3 MMOL/L (ref 22–29)
CREAT SERPL-MCNC: 2.11 MG/DL (ref 0.76–1.27)
GFR SERPL CREATININE-BSD FRML MDRD: 31 ML/MIN/1.73
GFR SERPL CREATININE-BSD FRML MDRD: 37 ML/MIN/1.73
GLOBULIN UR ELPH-MCNC: 2.8 GM/DL
GLUCOSE SERPL-MCNC: 68 MG/DL (ref 65–99)
POTASSIUM SERPL-SCNC: 5.1 MMOL/L (ref 3.5–5.2)
PROT SERPL-MCNC: 7.2 G/DL (ref 6–8.5)
SODIUM SERPL-SCNC: 139 MMOL/L (ref 136–145)
T-UPTAKE NFR SERPL: 0.93 TBI (ref 0.8–1.3)
T4 SERPL-MCNC: 5.22 MCG/DL (ref 4.5–11.7)
TSH SERPL DL<=0.05 MIU/L-ACNC: 3.73 UIU/ML (ref 0.27–4.2)

## 2021-10-01 PROCEDURE — 84436 ASSAY OF TOTAL THYROXINE: CPT

## 2021-10-01 PROCEDURE — 84479 ASSAY OF THYROID (T3 OR T4): CPT

## 2021-10-01 PROCEDURE — 36415 COLL VENOUS BLD VENIPUNCTURE: CPT

## 2021-10-01 PROCEDURE — 80053 COMPREHEN METABOLIC PANEL: CPT

## 2021-10-01 PROCEDURE — 84443 ASSAY THYROID STIM HORMONE: CPT

## 2021-10-06 ENCOUNTER — TELEPHONE (OUTPATIENT)
Dept: CARDIOLOGY | Facility: CLINIC | Age: 76
End: 2021-10-06

## 2021-10-06 NOTE — TELEPHONE ENCOUNTER
Pt stated that he doesn't have an appt with Dr. Amaya but josh singer seeing an internist, first name Janna, wasn't sure of the last name, and will get a referral from her to see another kidney doctor.

## 2021-10-06 NOTE — TELEPHONE ENCOUNTER
----- Message from Patricia Lopez sent at 10/6/2021  7:57 AM EDT -----    ----- Message -----  From: Cynthia Bustillo June, APRN  Sent: 10/6/2021   7:44 AM EDT  To: [facility] Clinical Pool    When does he see Dr. Amaya?  Kidney functions a little better but still high.

## 2021-10-07 ENCOUNTER — TELEPHONE (OUTPATIENT)
Dept: CARDIOLOGY | Facility: CLINIC | Age: 76
End: 2021-10-07

## 2021-10-07 NOTE — TELEPHONE ENCOUNTER
----- Message from Beba Grant MD sent at 10/6/2021  9:48 PM EDT -----  Let him kmow thyroid function is normal.  Kidney function is a little worse.  To clarify with patient. He had outside labs in August. These kidney labs are slightly better than those labs

## 2021-10-27 RX ORDER — LEVOTHYROXINE SODIUM 75 UG/1
TABLET ORAL
Qty: 90 TABLET | Refills: 0 | Status: SHIPPED | OUTPATIENT
Start: 2021-10-27 | End: 2022-04-08

## 2021-11-10 ENCOUNTER — OFFICE VISIT (OUTPATIENT)
Dept: INTERNAL MEDICINE | Facility: CLINIC | Age: 76
End: 2021-11-10

## 2021-11-10 VITALS — WEIGHT: 158 LBS | HEIGHT: 66 IN | BODY MASS INDEX: 25.39 KG/M2 | RESPIRATION RATE: 12 BRPM

## 2021-11-10 DIAGNOSIS — E78.5 HYPERLIPIDEMIA LDL GOAL <70: Primary | ICD-10-CM

## 2021-11-10 DIAGNOSIS — M1A.9XX0 CHRONIC GOUT WITHOUT TOPHUS, UNSPECIFIED CAUSE, UNSPECIFIED SITE: ICD-10-CM

## 2021-11-10 DIAGNOSIS — E03.8 HYPOTHYROIDISM DUE TO HASHIMOTO'S THYROIDITIS: ICD-10-CM

## 2021-11-10 DIAGNOSIS — E11.9 TYPE 2 DIABETES MELLITUS WITHOUT COMPLICATION, WITHOUT LONG-TERM CURRENT USE OF INSULIN (HCC): ICD-10-CM

## 2021-11-10 DIAGNOSIS — N18.4 CKD (CHRONIC KIDNEY DISEASE) STAGE 4, GFR 15-29 ML/MIN (HCC): ICD-10-CM

## 2021-11-10 DIAGNOSIS — E06.3 HYPOTHYROIDISM DUE TO HASHIMOTO'S THYROIDITIS: ICD-10-CM

## 2021-11-10 PROBLEM — D47.2 MGUS (MONOCLONAL GAMMOPATHY OF UNKNOWN SIGNIFICANCE): Status: ACTIVE | Noted: 2018-09-04

## 2021-11-10 PROBLEM — N18.31 STAGE 3A CHRONIC KIDNEY DISEASE: Status: ACTIVE | Noted: 2019-07-22

## 2021-11-10 PROBLEM — L40.9 PSORIASIS: Status: ACTIVE | Noted: 2021-07-07

## 2021-11-10 PROBLEM — D63.1 ANEMIA DUE TO CHRONIC KIDNEY DISEASE: Status: ACTIVE | Noted: 2021-08-05

## 2021-11-10 PROBLEM — D69.6 THROMBOCYTOPENIA (HCC): Status: ACTIVE | Noted: 2018-09-04

## 2021-11-10 PROBLEM — N18.9 ANEMIA DUE TO CHRONIC KIDNEY DISEASE: Status: ACTIVE | Noted: 2021-08-05

## 2021-11-10 PROBLEM — M10.00 IDIOPATHIC GOUT: Status: ACTIVE | Noted: 2019-07-22

## 2021-11-10 LAB
ALBUMIN SERPL-MCNC: 4.4 G/DL (ref 3.5–5.2)
ALBUMIN/GLOB SERPL: 1.6 G/DL
ALP SERPL-CCNC: 50 U/L (ref 39–117)
ALT SERPL W P-5'-P-CCNC: 16 U/L (ref 1–41)
ANION GAP SERPL CALCULATED.3IONS-SCNC: 9.4 MMOL/L (ref 5–15)
AST SERPL-CCNC: 19 U/L (ref 1–40)
BASOPHILS # BLD AUTO: 0.04 10*3/MM3 (ref 0–0.2)
BASOPHILS NFR BLD AUTO: 0.7 % (ref 0–1.5)
BILIRUB SERPL-MCNC: 0.4 MG/DL (ref 0–1.2)
BUN SERPL-MCNC: 39 MG/DL (ref 8–23)
BUN/CREAT SERPL: 16 (ref 7–25)
CALCIUM SPEC-SCNC: 10.3 MG/DL (ref 8.6–10.5)
CHLORIDE SERPL-SCNC: 104 MMOL/L (ref 98–107)
CHOLEST SERPL-MCNC: 98 MG/DL (ref 0–200)
CO2 SERPL-SCNC: 23.6 MMOL/L (ref 22–29)
CREAT SERPL-MCNC: 2.43 MG/DL (ref 0.76–1.27)
DEPRECATED RDW RBC AUTO: 44.6 FL (ref 37–54)
EOSINOPHIL # BLD AUTO: 0.21 10*3/MM3 (ref 0–0.4)
EOSINOPHIL NFR BLD AUTO: 3.7 % (ref 0.3–6.2)
ERYTHROCYTE [DISTWIDTH] IN BLOOD BY AUTOMATED COUNT: 13.8 % (ref 12.3–15.4)
GFR SERPL CREATININE-BSD FRML MDRD: 26 ML/MIN/1.73
GFR SERPL CREATININE-BSD FRML MDRD: 32 ML/MIN/1.73
GLOBULIN UR ELPH-MCNC: 2.8 GM/DL
GLUCOSE SERPL-MCNC: 93 MG/DL (ref 65–99)
HBA1C MFR BLD: 6.34 % (ref 4.8–5.6)
HCT VFR BLD AUTO: 33.9 % (ref 37.5–51)
HDLC SERPL-MCNC: 35 MG/DL (ref 40–60)
HGB BLD-MCNC: 10.9 G/DL (ref 13–17.7)
IMM GRANULOCYTES # BLD AUTO: 0.01 10*3/MM3 (ref 0–0.05)
IMM GRANULOCYTES NFR BLD AUTO: 0.2 % (ref 0–0.5)
LDLC SERPL CALC-MCNC: 39 MG/DL (ref 0–100)
LDLC/HDLC SERPL: 1.03 {RATIO}
LYMPHOCYTES # BLD AUTO: 2.4 10*3/MM3 (ref 0.7–3.1)
LYMPHOCYTES NFR BLD AUTO: 42.6 % (ref 19.6–45.3)
MCH RBC QN AUTO: 28.8 PG (ref 26.6–33)
MCHC RBC AUTO-ENTMCNC: 32.2 G/DL (ref 31.5–35.7)
MCV RBC AUTO: 89.7 FL (ref 79–97)
MONOCYTES # BLD AUTO: 0.46 10*3/MM3 (ref 0.1–0.9)
MONOCYTES NFR BLD AUTO: 8.2 % (ref 5–12)
NEUTROPHILS NFR BLD AUTO: 2.51 10*3/MM3 (ref 1.7–7)
NEUTROPHILS NFR BLD AUTO: 44.6 % (ref 42.7–76)
NRBC BLD AUTO-RTO: 0 /100 WBC (ref 0–0.2)
PLATELET # BLD AUTO: 124 10*3/MM3 (ref 140–450)
PMV BLD AUTO: 13 FL (ref 6–12)
POTASSIUM SERPL-SCNC: 4.6 MMOL/L (ref 3.5–5.2)
PROT SERPL-MCNC: 7.2 G/DL (ref 6–8.5)
RBC # BLD AUTO: 3.78 10*6/MM3 (ref 4.14–5.8)
SODIUM SERPL-SCNC: 137 MMOL/L (ref 136–145)
T4 FREE SERPL-MCNC: 1.56 NG/DL (ref 0.93–1.7)
TRIGL SERPL-MCNC: 134 MG/DL (ref 0–150)
TSH SERPL DL<=0.05 MIU/L-ACNC: 1.16 UIU/ML (ref 0.27–4.2)
URATE SERPL-MCNC: 2.5 MG/DL (ref 3.4–7)
VLDLC SERPL-MCNC: 24 MG/DL (ref 5–40)
WBC # BLD AUTO: 5.63 10*3/MM3 (ref 3.4–10.8)

## 2021-11-10 PROCEDURE — 86225 DNA ANTIBODY NATIVE: CPT | Performed by: INTERNAL MEDICINE

## 2021-11-10 PROCEDURE — 80053 COMPREHEN METABOLIC PANEL: CPT | Performed by: INTERNAL MEDICINE

## 2021-11-10 PROCEDURE — 84439 ASSAY OF FREE THYROXINE: CPT | Performed by: INTERNAL MEDICINE

## 2021-11-10 PROCEDURE — 85025 COMPLETE CBC W/AUTO DIFF WBC: CPT | Performed by: INTERNAL MEDICINE

## 2021-11-10 PROCEDURE — 80061 LIPID PANEL: CPT | Performed by: INTERNAL MEDICINE

## 2021-11-10 PROCEDURE — 99204 OFFICE O/P NEW MOD 45 MIN: CPT | Performed by: INTERNAL MEDICINE

## 2021-11-10 PROCEDURE — 83036 HEMOGLOBIN GLYCOSYLATED A1C: CPT | Performed by: INTERNAL MEDICINE

## 2021-11-10 PROCEDURE — 36415 COLL VENOUS BLD VENIPUNCTURE: CPT | Performed by: INTERNAL MEDICINE

## 2021-11-10 PROCEDURE — 86235 NUCLEAR ANTIGEN ANTIBODY: CPT | Performed by: INTERNAL MEDICINE

## 2021-11-10 PROCEDURE — 84443 ASSAY THYROID STIM HORMONE: CPT | Performed by: INTERNAL MEDICINE

## 2021-11-10 PROCEDURE — G0439 PPPS, SUBSEQ VISIT: HCPCS | Performed by: INTERNAL MEDICINE

## 2021-11-10 PROCEDURE — 86038 ANTINUCLEAR ANTIBODIES: CPT | Performed by: INTERNAL MEDICINE

## 2021-11-10 PROCEDURE — 84550 ASSAY OF BLOOD/URIC ACID: CPT | Performed by: INTERNAL MEDICINE

## 2021-11-10 RX ORDER — LOSARTAN POTASSIUM 25 MG/1
25 TABLET ORAL EVERY OTHER DAY
Qty: 45 TABLET | Refills: 2 | Status: SHIPPED | OUTPATIENT
Start: 2021-11-10 | End: 2022-03-30 | Stop reason: SDUPTHER

## 2021-11-10 NOTE — PROGRESS NOTES
The ABCs of the Annual Wellness Visit  Subsequent Medicare Wellness Visit    Chief Complaint   Patient presents with   • Establish Care   • Medicare Wellness-subsequent   • Diabetes   • Hyperlipidemia     Dr. Blanco   • Hypertension     Dr. Grant   • Hypothyroidism   • Chronic Kidney Disease     Dr. Amaya      Subjective    History of Present Illness:  Nelson Wang is a 76 y.o. male who presents for a Subsequent Medicare Wellness Visit.    The following portions of the patient's history were reviewed and   updated as appropriate: allergies, current medications, past family history, past medical history, past social history, past surgical history and problem list.    Compared to one year ago, the patient feels his physical   health is better.    Compared to one year ago, the patient feels his mental   health is better.    Recent Hospitalizations:  He was not admitted to the hospital during the last year.       Current Medical Providers:  Patient Care Team:  Janna Mo MD as PCP - General (Internal Medicine)    Outpatient Medications Prior to Visit   Medication Sig Dispense Refill   • allopurinol (ZYLOPRIM) 100 MG tablet Take 200 mg by mouth 2 (Two) Times a Day.     • atorvastatin (Lipitor) 40 MG tablet Take 1 tablet by mouth Daily. 90 tablet 3   • carvedilol (COREG) 12.5 MG tablet Take 1 tablet by mouth 2 (Two) Times a Day. 180 tablet 3   • Euthyrox 75 MCG tablet Take 1 tablet by mouth once daily 90 tablet 0   • furosemide (LASIX) 40 MG tablet Take 1 tablet by mouth once daily 90 tablet 3   • losartan (COZAAR) 25 MG tablet Take 1 tablet by mouth Every Other Day. 45 tablet 0     No facility-administered medications prior to visit.       No opioid medication identified on active medication list. I have reviewed chart for other potential  high risk medication/s and harmful drug interactions in the elderly.          Aspirin is not on active medication list.  Aspirin use is contraindicated for this patient due  "to: low platelets.  .    Patient Active Problem List   Diagnosis   • CKD (chronic kidney disease) stage 4, GFR 15-29 ml/min (HCC)   • Hyperlipidemia LDL goal <70   • Essential hypertension   • Bradycardia, sinus   • Anemia due to chronic kidney disease   • Hypothyroidism   • Idiopathic gout   • MGUS (monoclonal gammopathy of unknown significance)   • Proteinuria   • Psoriasis   • Stage 3a chronic kidney disease (HCC)   • Thrombocytopenia (HCC)   • Type 2 diabetes mellitus without complication (HCC)     Advance Care Planning  Advance Directive is not on file.  ACP discussion was held with the patient during this visit. His Daughter Jose would make decisions if he cannot          Objective    Vitals:    11/10/21 0853   Resp: 12   Weight: 71.7 kg (158 lb)   Height: 167.6 cm (66\")     BMI Readings from Last 1 Encounters:   11/10/21 25.50 kg/m²   BMI is above normal parameters. Recommendations include: due to medical issues will just monitor for now    Does the patient have evidence of cognitive impairment? No    Physical Exam            HEALTH RISK ASSESSMENT    Smoking Status:  Social History     Tobacco Use   Smoking Status Former Smoker   • Packs/day: 1.00   • Types: Cigarettes   Smokeless Tobacco Never Used   Tobacco Comment    quit 25 years ago, chews nicotine gum     Alcohol Consumption:  Social History     Substance and Sexual Activity   Alcohol Use Never     Fall Risk Screen:    JENNIFER Fall Risk Assessment was completed, and patient is at LOW risk for falls.Assessment completed on:11/10/2021    Depression Screening:  PHQ-2/PHQ-9 Depression Screening 11/10/2021   Little interest or pleasure in doing things 0   Feeling down, depressed, or hopeless 0   Trouble falling or staying asleep, or sleeping too much 0   Feeling tired or having little energy 0   Poor appetite or overeating 0   Feeling bad about yourself - or that you are a failure or have let yourself or your family down 0   Trouble concentrating on things, " such as reading the newspaper or watching television 0   Moving or speaking so slowly that other people could have noticed. Or the opposite - being so fidgety or restless that you have been moving around a lot more than usual 0   Thoughts that you would be better off dead, or of hurting yourself in some way 0   Total Score 0   If you checked off any problems, how difficult have these problems made it for you to do your work, take care of things at home, or get along with other people? Not difficult at all       Health Habits and Functional and Cognitive Screening:  Functional & Cognitive Status 11/10/2021   Do you have difficulty preparing food and eating? No   Do you have difficulty bathing yourself, getting dressed or grooming yourself? No   Do you have difficulty using the toilet? No   Do you have difficulty moving around from place to place? No   Current Diet Well Balanced Diet   Dental Exam Up to date   Eye Exam Up to date   Exercise (times per week) 0 times per week   Current Exercises Include No Regular Exercise   Do you need help using the phone?  No   Are you deaf or do you have serious difficulty hearing?  No   Do you need help with transportation? No   Do you need help shopping? No   Do you need help preparing meals?  No   Do you need help with housework?  No   Do you need help with laundry? No   Do you need help taking your medications? No   Do you need help managing money? No   Do you ever drive or ride in a car without wearing a seat belt? No   Have you felt unusual stress, anger or loneliness in the last month? No   Who do you live with? Spouse   If you need help, do you have trouble finding someone available to you? No   Have you been bothered in the last four weeks by sexual problems? No   Do you have difficulty concentrating, remembering or making decisions? No       Age-appropriate Screening Schedule:  Refer to the list below for future screening recommendations based on patient's age, sex and/or  medical conditions. Orders for these recommended tests are listed in the plan section. The patient has been provided with a written plan.    Health Maintenance   Topic Date Due   • TDAP/TD VACCINES (1 - Tdap) Never done   • ZOSTER VACCINE (1 of 2) Never done   • URINE MICROALBUMIN  05/22/2021   • DIABETIC FOOT EXAM  Never done   • HEMOGLOBIN A1C  06/22/2021   • DIABETIC EYE EXAM  Never done   • LIPID PANEL  07/09/2021   • INFLUENZA VACCINE  Completed              Assessment/Plan   CMS Preventative Services Quick Reference  Risk Factors Identified During Encounter  Immunizations Discussed/Encouraged (specific Immunizations; he is UTD  The above risks/problems have been discussed with the patient.  Follow up actions/plans if indicated are seen below in the Assessment/Plan Section.  Pertinent information has been shared with the patient in the After Visit Summary.    Diagnoses and all orders for this visit:    1. Hyperlipidemia LDL goal <70 (Primary)  Comments:  will check labs and adjust meds as needed  Orders:  -     Lipid Panel    2. Hypothyroidism due to Hashimoto's thyroiditis  Comments:  labs today and adjust as needed  Orders:  -     TSH  -     T4, Free    3. Type 2 diabetes mellitus without complication, without long-term current use of insulin ([facility])  Comments:  A1C today and proceed from there  Orders:  -     CBC & Differential  -     Comprehensive Metabolic Panel  -     MicroAlbumin, Urine, Random - Urine, Clean Catch  -     Hemoglobin A1c  -     MARIAM; Future  -     Anti-DNA Antibody, Double-stranded; Future  -     Anti-Smith Antibody; Future  -     Scleroderma Diagnostic Profile; Future  -     MARIAM  -     Anti-DNA Antibody, Double-stranded  -     Anti-Smith Antibody  -     Scleroderma Diagnostic Profile    4. CKD (chronic kidney disease) stage 4, GFR 15-29 ml/min ([facility])  Comments:  will refer to Dr. Castro for further work up; will order autoimmune testing as well  Orders:  -     Ambulatory Referral to  Nephrology  -     Anti-DNA Antibody, Double-stranded; Future  -     Anti-Smith Antibody; Future  -     Scleroderma Diagnostic Profile; Future  -     Anti-DNA Antibody, Double-stranded  -     Anti-Smith Antibody  -     Scleroderma Diagnostic Profile    5. Chronic gout without tophus, unspecified cause, unspecified site  Comments:  will check uric acid today  Orders:  -     Uric acid; Future  -     Uric acid        Follow Up:   Return in about 2 months (around 1/10/2022).     An After Visit Summary and PPPS were made available to the patient.

## 2021-11-10 NOTE — PROGRESS NOTES
"Chief Complaint  Establish Care, Medicare Wellness-subsequent, Diabetes, Hyperlipidemia (Dr. Blanco), Hypertension (Dr. Grant), Hypothyroidism, and Chronic Kidney Disease (Dr. Amaya)    Subjective          Nelson Wang presents to [facility]  History of Present Illness    Diabetes since 46  HTN 36  HLD since 36    He he has managed these condidtions very well    CKD stage III    Has several autoimmune conditions  Vitiligo, psoriasis    Hx of low blood count, saw Dr. Tubbs for this    Has had kidney follow up with Dr. Wang  Then saw Dr. Amaya for awhile    Retired, does gardening   Studies buddism and walks regularly    Has never been overweight    Used to take stellara and now skyrese for his psoriasis  He would have small flares with the stellara really likes new meds well    Feels well  No chest pain  No trouble breathing  Normally goes to the bathroom well    Colonoscopy in May    Worried about kidneys worsening    Sees   Dr. Tubbs for heme/onc  Dr. Grant for Cardiology  Dr. Peraza for GI  Does not have a nephrologist currently    Objective   Vital Signs:   Resp 12   Ht 167.6 cm (66\")   Wt 71.7 kg (158 lb)   BMI 25.50 kg/m²     Physical Exam  Vitals reviewed.   Constitutional:       Appearance: Normal appearance. He is well-developed.   HENT:      Head: Normocephalic and atraumatic.      Right Ear: External ear normal.      Left Ear: External ear normal.   Eyes:      Conjunctiva/sclera: Conjunctivae normal.      Pupils: Pupils are equal, round, and reactive to light.   Cardiovascular:      Rate and Rhythm: Normal rate and regular rhythm.      Heart sounds: No murmur heard.  No friction rub. No gallop.    Pulmonary:      Effort: Pulmonary effort is normal.      Breath sounds: Normal breath sounds. No wheezing or rhonchi.   Skin:     General: Skin is warm and dry.      Comments: Areas of hypopigmentation   Neurological:      Mental Status: He is alert and oriented " to person, place, and time.      Cranial Nerves: No cranial nerve deficit.   Psychiatric:         Mood and Affect: Affect normal.         Behavior: Behavior normal.         Thought Content: Thought content normal.        Result Review :     Common labs    Common Labsle 8/5/21 10/1/21   Glucose  68   BUN  37 (A)   Creatinine  2.11 (A)   eGFR Non  Am  31 (A)   eGFR African Am  37 (A)   Sodium  139   Potassium  5.1   Chloride  107   Calcium  9.7   Albumin  4.40   Total Bilirubin  0.4   Alkaline Phosphatase  60   AST (SGOT)  34   ALT (SGPT)  19   WBC 5.90    Hemoglobin 11.5 (A)    Hematocrit 36.5 (A)    Platelets 107 (A)    (A) Abnormal value               Procedures      Assessment and Plan    Diagnoses and all orders for this visit:    1. Hyperlipidemia LDL goal <70 (Primary)  Comments:  will check labs and adjust meds as needed  Orders:  -     Lipid Panel    2. Hypothyroidism due to Hashimoto's thyroiditis  Comments:  labs today and adjust as needed  Orders:  -     TSH  -     T4, Free    3. Type 2 diabetes mellitus without complication, without long-term current use of insulin ([facility])  Comments:  A1C today and proceed from there  Orders:  -     CBC & Differential  -     Comprehensive Metabolic Panel  -     MicroAlbumin, Urine, Random - Urine, Clean Catch  -     Hemoglobin A1c  -     MARIAM; Future  -     Anti-DNA Antibody, Double-stranded; Future  -     Anti-Smith Antibody; Future  -     Scleroderma Diagnostic Profile; Future  -     MARIAM  -     Anti-DNA Antibody, Double-stranded  -     Anti-Smith Antibody  -     Scleroderma Diagnostic Profile    4. CKD (chronic kidney disease) stage 4, GFR 15-29 ml/min ([facility])  Comments:  will refer to Dr. Castro for further work up; will order autoimmune testing as well  Orders:  -     Ambulatory Referral to Nephrology  -     Anti-DNA Antibody, Double-stranded; Future  -     Anti-Smith Antibody; Future  -     Scleroderma Diagnostic Profile; Future  -     Anti-DNA Antibody,  Double-stranded  -     Anti-Smith Antibody  -     Scleroderma Diagnostic Profile    5. Chronic gout without tophus, unspecified cause, unspecified site  Comments:  will check uric acid today  Orders:  -     Uric acid; Future  -     Uric acid              Follow Up   No follow-ups on file.  Patient was given instructions and counseling regarding his condition or for health maintenance advice. Please see specific information pulled into the AVS if appropriate.

## 2021-11-11 LAB
ANA SER QL: NEGATIVE
DSDNA AB SER-ACNC: 2 IU/ML (ref 0–9)
DSDNA IGG SERPL IA-ACNC: NEGATIVE [IU]/ML
ENA SCL70 AB SER-ACNC: 0.2 AI (ref 0–0.9)
ENA SM AB SER-ACNC: <0.2 AI (ref 0–0.9)
NUCLEAR IGG SER IA-RTO: NEGATIVE

## 2021-11-15 ENCOUNTER — TELEPHONE (OUTPATIENT)
Dept: CARDIOLOGY | Facility: CLINIC | Age: 76
End: 2021-11-15

## 2021-11-15 NOTE — TELEPHONE ENCOUNTER
Called pt as directed to ask if he has made and apppt with a nephrologist. He said he was seeing one has since been seen by his PCP, Dr. Mo. He stated that he had recent labs on 11-10-21 (in [facility]).

## 2021-11-18 ENCOUNTER — TELEPHONE (OUTPATIENT)
Dept: INTERNAL MEDICINE | Facility: CLINIC | Age: 76
End: 2021-11-18

## 2021-11-18 DIAGNOSIS — R79.89 LOW SERUM URIC ACID FOR AGE: Primary | ICD-10-CM

## 2021-11-18 NOTE — TELEPHONE ENCOUNTER
Caller: Nelson Wang    Relationship: Self    Best call back number: [phone]    What was the call regarding: PATIENT WOULD LIKE A CALL BACK TO DISCUSS HIS RECENT LAB RESULTS    Do you require a callback: YES

## 2022-01-17 ENCOUNTER — OFFICE VISIT (OUTPATIENT)
Dept: INTERNAL MEDICINE | Facility: CLINIC | Age: 77
End: 2022-01-17

## 2022-01-17 VITALS
OXYGEN SATURATION: 96 % | SYSTOLIC BLOOD PRESSURE: 132 MMHG | WEIGHT: 153 LBS | RESPIRATION RATE: 18 BRPM | HEART RATE: 73 BPM | TEMPERATURE: 97.4 F | HEIGHT: 66 IN | DIASTOLIC BLOOD PRESSURE: 72 MMHG | BODY MASS INDEX: 24.59 KG/M2

## 2022-01-17 DIAGNOSIS — I10 ESSENTIAL HYPERTENSION: ICD-10-CM

## 2022-01-17 DIAGNOSIS — D47.2 MGUS (MONOCLONAL GAMMOPATHY OF UNKNOWN SIGNIFICANCE): ICD-10-CM

## 2022-01-17 DIAGNOSIS — M1A.00X0 IDIOPATHIC CHRONIC GOUT WITHOUT TOPHUS, UNSPECIFIED SITE: ICD-10-CM

## 2022-01-17 DIAGNOSIS — E11.9 TYPE 2 DIABETES MELLITUS WITHOUT COMPLICATION, WITHOUT LONG-TERM CURRENT USE OF INSULIN: ICD-10-CM

## 2022-01-17 DIAGNOSIS — E78.5 HYPERLIPIDEMIA LDL GOAL <70: ICD-10-CM

## 2022-01-17 DIAGNOSIS — N18.4 CKD (CHRONIC KIDNEY DISEASE) STAGE 4, GFR 15-29 ML/MIN: Primary | ICD-10-CM

## 2022-01-17 PROBLEM — N18.31 STAGE 3A CHRONIC KIDNEY DISEASE: Status: RESOLVED | Noted: 2019-07-22 | Resolved: 2022-01-17

## 2022-01-17 PROCEDURE — 99214 OFFICE O/P EST MOD 30 MIN: CPT | Performed by: INTERNAL MEDICINE

## 2022-01-17 RX ORDER — FUROSEMIDE 20 MG/1
20 TABLET ORAL 2 TIMES DAILY
Qty: 90 TABLET | Refills: 1 | Status: SHIPPED | OUTPATIENT
Start: 2022-01-17 | End: 2022-03-18 | Stop reason: SDUPTHER

## 2022-01-17 NOTE — PROGRESS NOTES
"Chief Complaint  Follow-up (2 Month), Diabetes, Hypothyroidism, Hyperlipidemia, and Hypertension    Subjective          Nelson Moncadael presents to [facility]  History of Present Illness    States that he is still worried about his kidneys  Appointment with Dr. Castro is not until March unfortunately    Reviewed labs that were normal  Other than worsening kidney function    Slight ankle pain once in awhile, otherwise no other pains    No chest pain  No trouble breathing    He was worried that the Zoloft could be contributing to his kidney issues however he tried going off of it and noticed that he was more irritable therefore has restarted it    States that Dr. Wang put him on the lasix many years ago, but he isn't sure why    Has a diagnosis of MGUS sees Dr. Tubbs for this Dr. Tubbs has not told him to do anything specifically just wants to monitor for now however did suggest getting in with a nephrologist      Objective   Vital Signs:   /72 (BP Location: Left arm, Patient Position: Sitting, Cuff Size: Adult)   Pulse 73   Temp 97.4 °F (36.3 °C)   Resp 18   Ht 167.6 cm (66\")   Wt 69.4 kg (153 lb)   SpO2 96%   BMI 24.69 kg/m²     Physical Exam  Vitals reviewed.   Constitutional:       Appearance: Normal appearance. He is well-developed.   HENT:      Head: Normocephalic and atraumatic.      Right Ear: External ear normal.      Left Ear: External ear normal.   Eyes:      Conjunctiva/sclera: Conjunctivae normal.      Pupils: Pupils are equal, round, and reactive to light.   Cardiovascular:      Rate and Rhythm: Normal rate and regular rhythm.      Heart sounds: No murmur heard.  No friction rub. No gallop.    Pulmonary:      Effort: Pulmonary effort is normal.      Breath sounds: Normal breath sounds. No wheezing or rhonchi.   Skin:     General: Skin is warm and dry.      Comments: hypopigmentation   Neurological:      Mental Status: He is alert and oriented to " person, place, and time.      Cranial Nerves: No cranial nerve deficit.   Psychiatric:         Mood and Affect: Affect normal.         Behavior: Behavior normal.         Thought Content: Thought content normal.        Result Review :     Common labs    Common Labsle 8/5/21 10/1/21 11/10/21 11/10/21 11/10/21 11/10/21 11/10/21      0948 0948 0948 0948 0948   Glucose  68  93      BUN  37 (A)  39 (A)      Creatinine  2.11 (A)  2.43 (A)      eGFR Non  Am  31 (A)  26 (A)      eGFR  Am  37 (A)  32 (A)      Sodium  139  137      Potassium  5.1  4.6      Chloride  107  104      Calcium  9.7  10.3      Albumin  4.40  4.40      Total Bilirubin  0.4  0.4      Alkaline Phosphatase  60  50      AST (SGOT)  34  19      ALT (SGPT)  19  16      WBC 5.90  5.63       Hemoglobin 11.5 (A)  10.9 (A)       Hematocrit 36.5 (A)  33.9 (A)       Platelets 107 (A)  124 (A)       Total Cholesterol     98     Triglycerides     134     HDL Cholesterol     35 (A)     LDL Cholesterol      39     Hemoglobin A1C       6.34 (A)   Uric Acid      2.5 (A)    (A) Abnormal value               Procedures      Assessment and Plan    Diagnoses and all orders for this visit:    1. CKD (chronic kidney disease) stage 4, GFR 15-29 ml/min ([facility]) (Primary)  Comments:  Worsening he is very interested in a kidney biopsy to help with diagnosis we will try to move appointment with nephrology earlier    2. Type 2 diabetes mellitus without complication, without long-term current use of insulin ([facility])  Comments:  Sugar is down encouraged him to continue to work on this discussed that high sugars can contribute to worsening kidney function  Orders:  -     Cancel: Basic Metabolic Panel  -     Basic Metabolic Panel; Future    3. Hyperlipidemia LDL goal <70  Comments:  Well-controlled continue current meds    4. Essential hypertension  Comments:  Well-controlled continue current meds however will try to decrease Lasix as I am wondering if this is contributing to  his kidney issues repeat BMP in 1 month  Orders:  -     Cancel: Basic Metabolic Panel  -     Basic Metabolic Panel; Future    5. MGUS (monoclonal gammopathy of unknown significance)  Comments:  Discussed that this could be contributing to his renal function await appointment with nephrology    6. Idiopathic chronic gout without tophus, unspecified site  Comments:  Currently well controlled continue current meds    Other orders  -     furosemide (Lasix) 20 MG tablet; Take 1 tablet by mouth 2 (Two) Times a Day.  Dispense: 90 tablet; Refill: 1              Follow Up   Return in about 2 months (around 3/17/2022).  Patient was given instructions and counseling regarding his condition or for health maintenance advice. Please see specific information pulled into the AVS if appropriate.

## 2022-01-21 ENCOUNTER — TELEPHONE (OUTPATIENT)
Dept: INTERNAL MEDICINE | Facility: CLINIC | Age: 77
End: 2022-01-21

## 2022-01-21 NOTE — TELEPHONE ENCOUNTER
Caller: Nelson Wang    Relationship: Self    Best call back number: [phone]    What specialty or service is being requested: NEPHROLOGY    Any additional details: PATIENT IS CALLING WITH APPOINTMENT INFORMATION WITH A NEPHROLOGIST THAT HE WAS SCHEDULED WITH. HE BELIEVES THE NAME IS EDILSON. HE BELIEVES HE WAS SCHEDULED FOR THE 20 SOMETHING OF January, 2022, BUT HE IS NOT SURE WHEN IT IS AND WHERE THAT OFFICE IS LOCATED. HE IS REQUESTING A CALL TO GET THAT INFORMATION.

## 2022-01-24 ENCOUNTER — TRANSCRIBE ORDERS (OUTPATIENT)
Dept: LAB | Facility: HOSPITAL | Age: 77
End: 2022-01-24

## 2022-01-24 ENCOUNTER — LAB (OUTPATIENT)
Dept: LAB | Facility: HOSPITAL | Age: 77
End: 2022-01-24

## 2022-01-24 DIAGNOSIS — E78.5 HYPERLIPIDEMIA LDL GOAL <70: ICD-10-CM

## 2022-01-24 DIAGNOSIS — R80.1 PERSISTENT PROTEINURIA: Primary | ICD-10-CM

## 2022-01-24 DIAGNOSIS — I10 ESSENTIAL HYPERTENSION: ICD-10-CM

## 2022-01-24 DIAGNOSIS — N18.4 CKD (CHRONIC KIDNEY DISEASE) STAGE 4, GFR 15-29 ML/MIN: ICD-10-CM

## 2022-01-24 DIAGNOSIS — N18.4 STAGE 4 CHRONIC KIDNEY DISEASE: ICD-10-CM

## 2022-01-24 DIAGNOSIS — E11.9 TYPE 2 DIABETES MELLITUS WITHOUT COMPLICATION, WITHOUT LONG-TERM CURRENT USE OF INSULIN: ICD-10-CM

## 2022-01-24 DIAGNOSIS — R80.1 PERSISTENT PROTEINURIA: ICD-10-CM

## 2022-01-24 LAB
25(OH)D3 SERPL-MCNC: 73.7 NG/ML
BACTERIA UR QL AUTO: NORMAL /HPF
BASOPHILS # BLD AUTO: 0.03 10*3/MM3 (ref 0–0.2)
BASOPHILS NFR BLD AUTO: 0.5 % (ref 0–1.5)
BILIRUB UR QL STRIP: NEGATIVE
CLARITY UR: CLEAR
COLOR UR: YELLOW
CREAT UR-MCNC: 30.3 MG/DL
DEPRECATED RDW RBC AUTO: 46.3 FL (ref 37–54)
EOSINOPHIL # BLD AUTO: 0.17 10*3/MM3 (ref 0–0.4)
EOSINOPHIL NFR BLD AUTO: 2.7 % (ref 0.3–6.2)
ERYTHROCYTE [DISTWIDTH] IN BLOOD BY AUTOMATED COUNT: 13.8 % (ref 12.3–15.4)
GLUCOSE UR STRIP-MCNC: NEGATIVE MG/DL
HCT VFR BLD AUTO: 36.1 % (ref 37.5–51)
HGB BLD-MCNC: 11.4 G/DL (ref 13–17.7)
HGB UR QL STRIP.AUTO: ABNORMAL
HYALINE CASTS UR QL AUTO: NORMAL /LPF
KETONES UR QL STRIP: NEGATIVE
LEUKOCYTE ESTERASE UR QL STRIP.AUTO: NEGATIVE
LYMPHOCYTES # BLD AUTO: 2.09 10*3/MM3 (ref 0.7–3.1)
LYMPHOCYTES NFR BLD AUTO: 33.7 % (ref 19.6–45.3)
MCH RBC QN AUTO: 29.3 PG (ref 26.6–33)
MCHC RBC AUTO-ENTMCNC: 31.6 G/DL (ref 31.5–35.7)
MCV RBC AUTO: 92.8 FL (ref 79–97)
MONOCYTES # BLD AUTO: 0.4 10*3/MM3 (ref 0.1–0.9)
MONOCYTES NFR BLD AUTO: 6.4 % (ref 5–12)
NEUTROPHILS NFR BLD AUTO: 3.51 10*3/MM3 (ref 1.7–7)
NEUTROPHILS NFR BLD AUTO: 56.5 % (ref 42.7–76)
NITRITE UR QL STRIP: NEGATIVE
PH UR STRIP.AUTO: 6 [PH] (ref 5–8)
PHOSPHATE SERPL-MCNC: 4.6 MG/DL (ref 2.5–4.5)
PLATELET # BLD AUTO: 104 10*3/MM3 (ref 140–450)
PMV BLD AUTO: 13.5 FL (ref 6–12)
PROT ?TM UR-MCNC: 230.7 MG/DL
PROT UR QL STRIP: ABNORMAL
PROT/CREAT UR: 7.6 MG/G{CREAT}
PTH-INTACT SERPL-MCNC: 20.1 PG/ML (ref 15–65)
RBC # BLD AUTO: 3.89 10*6/MM3 (ref 4.14–5.8)
RBC # UR STRIP: NORMAL /HPF
REF LAB TEST METHOD: NORMAL
SP GR UR STRIP: 1.01 (ref 1–1.03)
SQUAMOUS #/AREA URNS HPF: NORMAL /HPF
UROBILINOGEN UR QL STRIP: ABNORMAL
WBC # UR STRIP: NORMAL /HPF
WBC NRBC COR # BLD: 6.21 10*3/MM3 (ref 3.4–10.8)

## 2022-01-24 PROCEDURE — 80061 LIPID PANEL: CPT

## 2022-01-24 PROCEDURE — 86160 COMPLEMENT ANTIGEN: CPT

## 2022-01-24 PROCEDURE — 83970 ASSAY OF PARATHORMONE: CPT

## 2022-01-24 PROCEDURE — 85025 COMPLETE CBC W/AUTO DIFF WBC: CPT

## 2022-01-24 PROCEDURE — 82306 VITAMIN D 25 HYDROXY: CPT

## 2022-01-24 PROCEDURE — 84100 ASSAY OF PHOSPHORUS: CPT

## 2022-01-24 PROCEDURE — 36415 COLL VENOUS BLD VENIPUNCTURE: CPT

## 2022-01-24 PROCEDURE — 83735 ASSAY OF MAGNESIUM: CPT

## 2022-01-24 PROCEDURE — 84156 ASSAY OF PROTEIN URINE: CPT

## 2022-01-24 PROCEDURE — 82570 ASSAY OF URINE CREATININE: CPT

## 2022-01-24 PROCEDURE — 81001 URINALYSIS AUTO W/SCOPE: CPT

## 2022-01-24 PROCEDURE — 80053 COMPREHEN METABOLIC PANEL: CPT

## 2022-01-25 DIAGNOSIS — N18.32 STAGE 3B CHRONIC KIDNEY DISEASE: Primary | ICD-10-CM

## 2022-01-25 LAB
ALBUMIN SERPL-MCNC: 3.8 G/DL (ref 3.5–5.2)
ALBUMIN/GLOB SERPL: 1.4 G/DL
ALP SERPL-CCNC: 53 U/L (ref 39–117)
ALT SERPL W P-5'-P-CCNC: 21 U/L (ref 1–41)
ANION GAP SERPL CALCULATED.3IONS-SCNC: 10.5 MMOL/L (ref 5–15)
AST SERPL-CCNC: 18 U/L (ref 1–40)
BILIRUB SERPL-MCNC: 0.6 MG/DL (ref 0–1.2)
BUN SERPL-MCNC: 39 MG/DL (ref 8–23)
BUN/CREAT SERPL: 15.1 (ref 7–25)
C3 SERPL-MCNC: 94 MG/DL (ref 82–167)
C4 SERPL-MCNC: 18 MG/DL (ref 14–44)
CALCIUM SPEC-SCNC: 9.8 MG/DL (ref 8.6–10.5)
CHLORIDE SERPL-SCNC: 106 MMOL/L (ref 98–107)
CHOLEST SERPL-MCNC: 123 MG/DL (ref 0–200)
CO2 SERPL-SCNC: 21.5 MMOL/L (ref 22–29)
CREAT SERPL-MCNC: 2.58 MG/DL (ref 0.76–1.27)
GFR SERPL CREATININE-BSD FRML MDRD: 24 ML/MIN/1.73
GFR SERPL CREATININE-BSD FRML MDRD: 30 ML/MIN/1.73
GLOBULIN UR ELPH-MCNC: 2.7 GM/DL
GLUCOSE SERPL-MCNC: 153 MG/DL (ref 65–99)
HDLC SERPL-MCNC: 62 MG/DL (ref 40–60)
LDLC SERPL CALC-MCNC: 47 MG/DL (ref 0–100)
LDLC/HDLC SERPL: 0.78 {RATIO}
MAGNESIUM SERPL-MCNC: 1.6 MG/DL (ref 1.6–2.4)
POTASSIUM SERPL-SCNC: 4.2 MMOL/L (ref 3.5–5.2)
PROT SERPL-MCNC: 6.5 G/DL (ref 6–8.5)
SODIUM SERPL-SCNC: 138 MMOL/L (ref 136–145)
TRIGL SERPL-MCNC: 64 MG/DL (ref 0–150)
VLDLC SERPL-MCNC: 14 MG/DL (ref 5–40)

## 2022-01-26 ENCOUNTER — TELEPHONE (OUTPATIENT)
Dept: CARDIOLOGY | Facility: CLINIC | Age: 77
End: 2022-01-26

## 2022-01-26 NOTE — TELEPHONE ENCOUNTER
----- Message from Beba Grant MD sent at 1/25/2022 11:57 AM EST -----  Let him know that his lipid panel looks good.  His kidney function is a little worse.  Have him increase his fluid intake and have him hold his Lasix/furosemide on Sundays.  Do another BMP in 1 month.  I have ordered it.

## 2022-02-02 ENCOUNTER — TRANSCRIBE ORDERS (OUTPATIENT)
Dept: ADMINISTRATIVE | Facility: HOSPITAL | Age: 77
End: 2022-02-02

## 2022-02-02 DIAGNOSIS — N18.4 CHRONIC KIDNEY DISEASE, STAGE IV (SEVERE): Primary | ICD-10-CM

## 2022-02-21 ENCOUNTER — LAB (OUTPATIENT)
Dept: LAB | Facility: HOSPITAL | Age: 77
End: 2022-02-21

## 2022-02-21 DIAGNOSIS — N18.32 STAGE 3B CHRONIC KIDNEY DISEASE: ICD-10-CM

## 2022-02-21 LAB
ANION GAP SERPL CALCULATED.3IONS-SCNC: 8 MMOL/L (ref 5–15)
BUN SERPL-MCNC: 39 MG/DL (ref 8–23)
BUN/CREAT SERPL: 13.8 (ref 7–25)
CALCIUM SPEC-SCNC: 9.1 MG/DL (ref 8.6–10.5)
CHLORIDE SERPL-SCNC: 115 MMOL/L (ref 98–107)
CO2 SERPL-SCNC: 19 MMOL/L (ref 22–29)
CREAT SERPL-MCNC: 2.82 MG/DL (ref 0.76–1.27)
GFR SERPL CREATININE-BSD FRML MDRD: 22 ML/MIN/1.73
GFR SERPL CREATININE-BSD FRML MDRD: 27 ML/MIN/1.73
GLUCOSE SERPL-MCNC: 71 MG/DL (ref 65–99)
POTASSIUM SERPL-SCNC: 5.3 MMOL/L (ref 3.5–5.2)
SODIUM SERPL-SCNC: 142 MMOL/L (ref 136–145)

## 2022-02-21 PROCEDURE — 36415 COLL VENOUS BLD VENIPUNCTURE: CPT

## 2022-02-21 PROCEDURE — 80048 BASIC METABOLIC PNL TOTAL CA: CPT

## 2022-02-22 ENCOUNTER — TELEPHONE (OUTPATIENT)
Dept: CARDIOLOGY | Facility: CLINIC | Age: 77
End: 2022-02-22

## 2022-02-22 DIAGNOSIS — N18.32 STAGE 3B CHRONIC KIDNEY DISEASE: Primary | ICD-10-CM

## 2022-02-22 NOTE — TELEPHONE ENCOUNTER
Reviewed labs with Dr. Grant. Per Epic, patient taking losartan 1 tablet QOD. Dr. Grant recommended patient to take losartan 1/2 tablet daily. Also patient to decrease K rich foods and repeat BMP in 2 weeks.    S/W patient who stated that he is taking losartan 25 mg 1 tablet daily. Advised patient of Dr. Grant's additional recommendations.     Will make Dr. Grant aware of losartan dose and call back with further recommendations.

## 2022-02-23 ENCOUNTER — TRANSCRIBE ORDERS (OUTPATIENT)
Dept: LAB | Facility: HOSPITAL | Age: 77
End: 2022-02-23

## 2022-02-23 ENCOUNTER — LAB (OUTPATIENT)
Dept: LAB | Facility: HOSPITAL | Age: 77
End: 2022-02-23

## 2022-02-23 DIAGNOSIS — N18.32 STAGE 3B CHRONIC KIDNEY DISEASE: ICD-10-CM

## 2022-02-23 DIAGNOSIS — N18.4 CHRONIC KIDNEY DISEASE, STAGE IV (SEVERE): Primary | ICD-10-CM

## 2022-02-23 DIAGNOSIS — N18.4 CHRONIC KIDNEY DISEASE, STAGE IV (SEVERE): ICD-10-CM

## 2022-02-23 LAB
ALBUMIN SERPL-MCNC: 3.5 G/DL (ref 3.5–5.2)
ANION GAP SERPL CALCULATED.3IONS-SCNC: 10.8 MMOL/L (ref 5–15)
BACTERIA UR QL AUTO: NORMAL /HPF
BASOPHILS # BLD AUTO: 0.04 10*3/MM3 (ref 0–0.2)
BASOPHILS NFR BLD AUTO: 0.6 % (ref 0–1.5)
BILIRUB UR QL STRIP: NEGATIVE
BUN SERPL-MCNC: 39 MG/DL (ref 8–23)
BUN/CREAT SERPL: 17.2 (ref 7–25)
CALCIUM SPEC-SCNC: 9.6 MG/DL (ref 8.6–10.5)
CHLORIDE SERPL-SCNC: 109 MMOL/L (ref 98–107)
CLARITY UR: CLEAR
CO2 SERPL-SCNC: 20.2 MMOL/L (ref 22–29)
COLLECT DURATION TIME UR: 24 HRS
COLLECT DURATION TIME UR: 24 HRS
COLOR UR: YELLOW
CREAT CL 24H UR+SERPL-VRATE: 21.4 ML/MIN (ref 97–137)
CREAT CL 24H UR+SERPL-VRATE: 30.9 L/24 HR (ref 139.7–197.3)
CREAT SERPL-MCNC: 2.27 MG/DL (ref 0.76–1.27)
CREAT UR-MCNC: 39.2 MG/DL
CREAT UR-MCNC: 60.7 MG/DL
CREATINE 24H UR-MRATE: 0.73 G/24 HR (ref 1–2.4)
DEPRECATED RDW RBC AUTO: 44.4 FL (ref 37–54)
EOSINOPHIL # BLD AUTO: 0.24 10*3/MM3 (ref 0–0.4)
EOSINOPHIL NFR BLD AUTO: 3.9 % (ref 0.3–6.2)
ERYTHROCYTE [DISTWIDTH] IN BLOOD BY AUTOMATED COUNT: 13.6 % (ref 12.3–15.4)
GFR SERPL CREATININE-BSD FRML MDRD: 28 ML/MIN/1.73
GFR SERPL CREATININE-BSD FRML MDRD: 34 ML/MIN/1.73
GLUCOSE SERPL-MCNC: 76 MG/DL (ref 65–99)
GLUCOSE UR STRIP-MCNC: NEGATIVE MG/DL
HCT VFR BLD AUTO: 35.1 % (ref 37.5–51)
HGB BLD-MCNC: 11.2 G/DL (ref 13–17.7)
HGB UR QL STRIP.AUTO: NEGATIVE
HYALINE CASTS UR QL AUTO: NORMAL /LPF
KETONES UR QL STRIP: NEGATIVE
LEUKOCYTE ESTERASE UR QL STRIP.AUTO: NEGATIVE
LYMPHOCYTES # BLD AUTO: 2.55 10*3/MM3 (ref 0.7–3.1)
LYMPHOCYTES NFR BLD AUTO: 41.1 % (ref 19.6–45.3)
MCH RBC QN AUTO: 28.8 PG (ref 26.6–33)
MCHC RBC AUTO-ENTMCNC: 31.9 G/DL (ref 31.5–35.7)
MCV RBC AUTO: 90.2 FL (ref 79–97)
MONOCYTES # BLD AUTO: 0.41 10*3/MM3 (ref 0.1–0.9)
MONOCYTES NFR BLD AUTO: 6.6 % (ref 5–12)
NEUTROPHILS NFR BLD AUTO: 2.95 10*3/MM3 (ref 1.7–7)
NEUTROPHILS NFR BLD AUTO: 47.6 % (ref 42.7–76)
NITRITE UR QL STRIP: NEGATIVE
PH UR STRIP.AUTO: 6 [PH] (ref 5–8)
PHOSPHATE SERPL-MCNC: 3.9 MG/DL (ref 2.5–4.5)
PLATELET # BLD AUTO: 100 10*3/MM3 (ref 140–450)
PMV BLD AUTO: 13.1 FL (ref 6–12)
POTASSIUM SERPL-SCNC: 5.2 MMOL/L (ref 3.5–5.2)
PROT 24H UR-MRATE: 5429.8 MG/24HOURS (ref 0–150)
PROT ?TM UR-MCNC: 433.6 MG/DL
PROT UR QL STRIP: ABNORMAL
PROT/CREAT UR: 7.14 MG/G{CREAT}
RBC # BLD AUTO: 3.89 10*6/MM3 (ref 4.14–5.8)
RBC # UR STRIP: NORMAL /HPF
REF LAB TEST METHOD: NORMAL
SODIUM SERPL-SCNC: 140 MMOL/L (ref 136–145)
SP GR UR STRIP: 1.01 (ref 1–1.03)
SPECIMEN VOL 24H UR: 1850 ML
SPECIMEN VOL 24H UR: 1850 ML
SQUAMOUS #/AREA URNS HPF: NORMAL /HPF
UROBILINOGEN UR QL STRIP: ABNORMAL
WBC # UR STRIP: NORMAL /HPF
WBC NRBC COR # BLD: 6.2 10*3/MM3 (ref 3.4–10.8)

## 2022-02-23 PROCEDURE — 84156 ASSAY OF PROTEIN URINE: CPT

## 2022-02-23 PROCEDURE — 86335 IMMUNFIX E-PHORSIS/URINE/CSF: CPT

## 2022-02-23 PROCEDURE — 82570 ASSAY OF URINE CREATININE: CPT

## 2022-02-23 PROCEDURE — 36415 COLL VENOUS BLD VENIPUNCTURE: CPT

## 2022-02-23 PROCEDURE — 82575 CREATININE CLEARANCE TEST: CPT

## 2022-02-23 PROCEDURE — 84166 PROTEIN E-PHORESIS/URINE/CSF: CPT

## 2022-02-23 PROCEDURE — 80069 RENAL FUNCTION PANEL: CPT

## 2022-02-23 PROCEDURE — 81001 URINALYSIS AUTO W/SCOPE: CPT

## 2022-02-23 PROCEDURE — 85025 COMPLETE CBC W/AUTO DIFF WBC: CPT

## 2022-02-24 ENCOUNTER — TELEPHONE (OUTPATIENT)
Dept: INTERNAL MEDICINE | Facility: CLINIC | Age: 77
End: 2022-02-24

## 2022-02-25 LAB
ALBUMIN 24H MFR UR ELPH: 72.7 %
ALPHA1 GLOB 24H MFR UR ELPH: 3.5 %
ALPHA2 GLOB 24H MFR UR ELPH: 4.3 %
B-GLOBULIN MFR UR ELPH: 9.9 %
GAMMA GLOB 24H MFR UR ELPH: 9.6 %
HIV 1 & 2 AB SER-IMP: NORMAL
INTERPRETATION UR IFE-IMP: NORMAL
M PROTEIN 24H MFR UR ELPH: NORMAL %
PROT UR-MCNC: 443.3 MG/DL

## 2022-02-28 LAB
ALBUMIN 24H MFR UR ELPH: 71.9 %
ALPHA1 GLOB 24H MFR UR ELPH: 1.9 %
ALPHA2 GLOB 24H MFR UR ELPH: 5.4 %
B-GLOBULIN MFR UR ELPH: 9.5 %
CREAT 24H UR-MRATE: 705 MG/24 HR (ref 1000–2000)
CREAT UR-MCNC: 38.1 MG/DL
GAMMA GLOB 24H MFR UR ELPH: 11.2 %
HIV 1 & 2 AB SER-IMP: ABNORMAL
INTERPRETATION UR IFE-IMP: ABNORMAL
M PROTEIN 24H MFR UR ELPH: ABNORMAL %
PROT UR-MCNC: 291.4 MG/DL

## 2022-03-01 ENCOUNTER — HOSPITAL ENCOUNTER (OUTPATIENT)
Dept: ULTRASOUND IMAGING | Facility: HOSPITAL | Age: 77
Discharge: HOME | End: 2022-03-01
Admitting: INTERNAL MEDICINE

## 2022-03-01 DIAGNOSIS — N18.4 CHRONIC KIDNEY DISEASE, STAGE IV (SEVERE): ICD-10-CM

## 2022-03-01 PROCEDURE — 76775 US EXAM ABDO BACK WALL LIM: CPT

## 2022-03-18 ENCOUNTER — OFFICE VISIT (OUTPATIENT)
Dept: INTERNAL MEDICINE | Facility: CLINIC | Age: 77
End: 2022-03-18

## 2022-03-18 VITALS
DIASTOLIC BLOOD PRESSURE: 54 MMHG | BODY MASS INDEX: 25.39 KG/M2 | TEMPERATURE: 97 F | WEIGHT: 158 LBS | HEART RATE: 58 BPM | HEIGHT: 66 IN | OXYGEN SATURATION: 100 % | RESPIRATION RATE: 14 BRPM | SYSTOLIC BLOOD PRESSURE: 110 MMHG

## 2022-03-18 DIAGNOSIS — E11.9 TYPE 2 DIABETES MELLITUS WITHOUT COMPLICATION, WITHOUT LONG-TERM CURRENT USE OF INSULIN: ICD-10-CM

## 2022-03-18 DIAGNOSIS — L80 VITILIGO: ICD-10-CM

## 2022-03-18 DIAGNOSIS — I10 ESSENTIAL HYPERTENSION: ICD-10-CM

## 2022-03-18 DIAGNOSIS — N18.4 STAGE 4 CHRONIC KIDNEY DISEASE: ICD-10-CM

## 2022-03-18 DIAGNOSIS — M1A.00X0 IDIOPATHIC CHRONIC GOUT WITHOUT TOPHUS, UNSPECIFIED SITE: ICD-10-CM

## 2022-03-18 DIAGNOSIS — D47.2 MGUS (MONOCLONAL GAMMOPATHY OF UNKNOWN SIGNIFICANCE): ICD-10-CM

## 2022-03-18 DIAGNOSIS — L40.9 PSORIASIS: ICD-10-CM

## 2022-03-18 DIAGNOSIS — N18.4 CKD (CHRONIC KIDNEY DISEASE) STAGE 4, GFR 15-29 ML/MIN: Primary | ICD-10-CM

## 2022-03-18 DIAGNOSIS — E78.5 HYPERLIPIDEMIA LDL GOAL <70: ICD-10-CM

## 2022-03-18 PROBLEM — D63.1 ANEMIA IN CHRONIC KIDNEY DISEASE: Status: RESOLVED | Noted: 2021-08-05 | Resolved: 2022-03-18

## 2022-03-18 PROBLEM — N18.9 ANEMIA IN CHRONIC KIDNEY DISEASE: Status: ACTIVE | Noted: 2021-08-05

## 2022-03-18 PROBLEM — N18.9 ANEMIA IN CHRONIC KIDNEY DISEASE: Status: RESOLVED | Noted: 2021-08-05 | Resolved: 2022-03-18

## 2022-03-18 PROBLEM — D63.1 ANEMIA IN CHRONIC KIDNEY DISEASE: Status: ACTIVE | Noted: 2021-08-05

## 2022-03-18 PROCEDURE — 99214 OFFICE O/P EST MOD 30 MIN: CPT | Performed by: INTERNAL MEDICINE

## 2022-03-18 RX ORDER — VIT C/B6/B5/MAGNESIUM/HERB 173 50-5-6-5MG
1 CAPSULE ORAL 2 TIMES DAILY
COMMUNITY

## 2022-03-18 RX ORDER — PROCHLORPERAZINE 25 MG/1
1 SUPPOSITORY RECTAL DAILY
Qty: 1 EACH | Refills: 3 | Status: SHIPPED | OUTPATIENT
Start: 2022-03-18 | End: 2022-09-06

## 2022-03-18 RX ORDER — PROCHLORPERAZINE 25 MG/1
SUPPOSITORY RECTAL
Qty: 3 EACH | Refills: 2 | Status: SHIPPED | OUTPATIENT
Start: 2022-03-18 | End: 2022-09-06 | Stop reason: ALTCHOICE

## 2022-03-18 RX ORDER — FUROSEMIDE 20 MG/1
20 TABLET ORAL DAILY
Qty: 90 TABLET | Refills: 1
Start: 2022-03-18 | End: 2022-04-11

## 2022-03-18 RX ORDER — PROCHLORPERAZINE 25 MG/1
1 SUPPOSITORY RECTAL DAILY
Qty: 1 EACH | Refills: 11 | Status: SHIPPED | OUTPATIENT
Start: 2022-03-18 | End: 2022-09-06 | Stop reason: ALTCHOICE

## 2022-03-18 RX ORDER — CHLORAL HYDRATE 500 MG
1000 CAPSULE ORAL
COMMUNITY

## 2022-03-18 NOTE — PROGRESS NOTES
"Chief Complaint  Follow-up for kidney disease    Subjective          Nelson Wang presents to [facility]  History of Present Illness     Had eye exam last week that looked great    No chest pain  No trouble breathing    Feels about the same as he has, overall well    Did recently see Dr. Castro who did blood work    Dr. Wang did decrease his lasix after our last visit and his kidney functino did slightly improve after this    No other concerns today    Stress meds cont to work well for him    Objective   Vital Signs:   /54 (BP Location: Right arm, Patient Position: Sitting, Cuff Size: Adult)   Pulse 58   Temp 97 °F (36.1 °C) (Temporal)   Resp 14   Ht 167.6 cm (66\")   Wt 71.7 kg (158 lb)   SpO2 100%   BMI 25.50 kg/m²     Physical Exam  Vitals reviewed.   Constitutional:       Appearance: Normal appearance. He is well-developed.   HENT:      Head: Normocephalic and atraumatic.      Right Ear: External ear normal.      Left Ear: External ear normal.   Eyes:      Conjunctiva/sclera: Conjunctivae normal.      Pupils: Pupils are equal, round, and reactive to light.   Cardiovascular:      Rate and Rhythm: Normal rate and regular rhythm.      Heart sounds: No murmur heard.    No friction rub. No gallop.   Pulmonary:      Effort: Pulmonary effort is normal.      Breath sounds: Normal breath sounds. No wheezing or rhonchi.   Skin:     General: Skin is warm and dry.   Neurological:      Mental Status: He is alert and oriented to person, place, and time.      Cranial Nerves: No cranial nerve deficit.   Psychiatric:         Mood and Affect: Affect normal.         Behavior: Behavior normal.         Thought Content: Thought content normal.        Result Review :       Common labs    Common Labsle 1/24/22 1/24/22 1/24/22 2/21/22 2/23/22 2/23/22    1258 1258 1258  0929 0929   Glucose   153 (A) 71  76   BUN   39 (A) 39 (A)  39 (A)   Creatinine   2.58 (A) 2.82 (A)  2.27 " (A)   eGFR Non African Am   24 (A) 22 (A)  28 (A)   eGFR  Am   30 (A) 27 (A)  34 (A)   Sodium   138 142  140   Potassium   4.2 5.3 (A)  5.2   Chloride   106 115 (A)  109 (A)   Calcium   9.8 9.1  9.6   Albumin   3.80   3.50   Total Bilirubin   0.6      Alkaline Phosphatase   53      AST (SGOT)   18      ALT (SGPT)   21      WBC 6.21    6.20    Hemoglobin 11.4 (A)    11.2 (A)    Hematocrit 36.1 (A)    35.1 (A)    Platelets 104 (A)    100 (A)    Total Cholesterol  123       Triglycerides  64       HDL Cholesterol  62 (A)       LDL Cholesterol   47       (A) Abnormal value                     Procedures        Assessment and Plan    Diagnoses and all orders for this visit:    1. CKD (chronic kidney disease) stage 4, GFR 15-29 ml/min ([facility]) (Primary)  Comments:  cont to work with nephro, however discussed trying to decrease lasix as he doesn't have a heart condition and it was started for BP years ago  Orders:  -     Ambulatory Referral to Rheumatology    2. Type 2 diabetes mellitus without complication, without long-term current use of insulin ([facility])  Comments:  very well controlled, cont to [facility]    3. MGUS (monoclonal gammopathy of unknown significance)  Comments:  stable doing well, cont to work with Dr. Tubbs    4. Idiopathic chronic gout without tophus, unspecified site  Comments:  uric acid well controlled    5. Essential hypertension  Comments:  well controlled, cont to [facility]; discussed continuing to try to decrease lasix and see if this could help renal function    6. Hyperlipidemia LDL goal <70  Comments:  stable, cont current meds    7. Stage 4 chronic kidney disease (HCC)  -     Ambulatory Referral to Rheumatology    8. Vitiligo  Comments:  will refer to rheum more for kidney work up, as he has several other autoimmune issues curious if rheum thinks the renal issues could be too  Orders:  -     Ambulatory Referral to Rheumatology    9. Psoriasis  Comments:  stable  Orders:  -     Ambulatory Referral to  Rheumatology    Other orders  -     furosemide (Lasix) 20 MG tablet; Take 1 tablet by mouth Daily.  Dispense: 90 tablet; Refill: 1  -     Continuous Blood Gluc Transmit (Dexcom G6 Transmitter) misc; 1 kit Daily.  Dispense: 1 each; Refill: 11  -     Continuous Blood Gluc Sensor (Dexcom G6 Sensor); Every 10 (Ten) Days.  Dispense: 3 each; Refill: 2  -     Continuous Blood Gluc  (Dexcom G6 ) device; 1 kit Daily.  Dispense: 1 each; Refill: 3                  Follow Up   Return in about 2 months (around 5/18/2022).  Patient was given instructions and counseling regarding his condition or for health maintenance advice. Please see specific information pulled into the AVS if appropriate.

## 2022-03-30 ENCOUNTER — OFFICE VISIT (OUTPATIENT)
Dept: CARDIOLOGY | Facility: CLINIC | Age: 77
End: 2022-03-30

## 2022-03-30 VITALS
HEIGHT: 66 IN | BODY MASS INDEX: 25.39 KG/M2 | WEIGHT: 158 LBS | SYSTOLIC BLOOD PRESSURE: 124 MMHG | DIASTOLIC BLOOD PRESSURE: 60 MMHG | HEART RATE: 64 BPM

## 2022-03-30 DIAGNOSIS — I10 ESSENTIAL HYPERTENSION: Primary | ICD-10-CM

## 2022-03-30 DIAGNOSIS — E78.5 HYPERLIPIDEMIA LDL GOAL <70: ICD-10-CM

## 2022-03-30 DIAGNOSIS — E03.9 ACQUIRED HYPOTHYROIDISM: ICD-10-CM

## 2022-03-30 DIAGNOSIS — N18.4 CKD (CHRONIC KIDNEY DISEASE) STAGE 4, GFR 15-29 ML/MIN: ICD-10-CM

## 2022-03-30 PROCEDURE — 99214 OFFICE O/P EST MOD 30 MIN: CPT | Performed by: INTERNAL MEDICINE

## 2022-03-30 RX ORDER — CARVEDILOL 12.5 MG/1
12.5 TABLET ORAL 2 TIMES DAILY
Qty: 270 TABLET | Refills: 3 | Status: SHIPPED | OUTPATIENT
Start: 2022-03-30 | End: 2022-06-15

## 2022-03-30 RX ORDER — LOSARTAN POTASSIUM 25 MG/1
25 TABLET ORAL DAILY
Qty: 90 TABLET | Refills: 3 | Status: SHIPPED | OUTPATIENT
Start: 2022-03-30 | End: 2022-07-10

## 2022-03-30 NOTE — ASSESSMENT & PLAN NOTE
His furosemide was stopped by his PCP because of worsening renal function.  This led to him becoming somewhat hypertensive.  We therefore increase his carvedilol to 2 tablets in the morning and 1 at night.  I have suggested to him that he should reverse that and take 2 tablets at night and 1 in the morning.  He will do that and continue to monitor his blood pressure.

## 2022-03-30 NOTE — ASSESSMENT & PLAN NOTE
He has been in type IV CKD for quite some time now.  His renal function recently got a little worse and his diuretic was discontinued.  He has not had any fluid retention and he will continue to stay off the furosemide.  If his blood pressure necessitates we can put him on a small dose of chlorthalidone.  He will continue to monitor his blood pressures at at home

## 2022-03-30 NOTE — ASSESSMENT & PLAN NOTE
His lipids are at goal with HDL of 62 and LDL of 47.  He will continue with atorvastatin 40 mg a day and his exercise program

## 2022-03-30 NOTE — PROGRESS NOTES
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  He is denying any chest pain palpitation shortness of breath dizziness or syncope.      Past Medical History:   Diagnosis Date   • CKD (chronic kidney disease)    • Diabetes (HCC)    • Gout    • High cholesterol    • HL (hearing loss)    • Hypertension    • Hypothyroidism    • Psoriasis    • Vitiligo        No Known Allergies     Past Surgical History:   Procedure Laterality Date   • ANKLE SURGERY  1990   • ANKLE SURGERY     • APPENDECTOMY     • HIP BIPOLAR REPLACEMENT      Right        Social History     Tobacco Use   • Smoking status: Former Smoker     Packs/day: 1.00     Types: Cigarettes   • Smokeless tobacco: Never Used   • Tobacco comment: quit 25 years ago, chews nicotine gum   Vaping Use   • Vaping Use: Never used   Substance Use Topics   • Alcohol use: Never   • Drug use: Never       Family History   Problem Relation Age of Onset   • Cancer Sister 35   • Diabetes Sister    • Diabetes Mother    • Heart disease Father    • Diabetes Brother    • Heart disease Paternal Uncle         Prior to Admission medications    Medication Sig Start Date End Date Taking? Authorizing Provider   allopurinol (ZYLOPRIM) 100 MG tablet Take 200 mg by mouth 2 (Two) Times a Day.   Yes Provider, MD Serena   ascorbic acid (VITAMIN C) 1000 MG tablet Take 1,000 mg by mouth.   Yes Provider, MD Serena   atorvastatin (Lipitor) 40 MG tablet Take 1 tablet by mouth Daily. 6/22/21  Yes Cynthia Bustillo JhoanaBECKY   Calcium Carbonate-Vitamin D (calcium-vitamin D) 500-200 MG-UNIT tablet per tablet Take 1 tablet by mouth.   Yes Provider, MD Serena   carvedilol (COREG) 12.5 MG tablet Take 1 tablet by mouth 2 (Two) Times a Day. 8/25/21  Yes Beba Grant,  "MD   Continuous Blood Gluc  (Dexcom G6 ) device 1 kit Daily. 3/18/22  Yes Janna Mo MD   Continuous Blood Gluc Sensor (Dexcom G6 Sensor) Every 10 (Ten) Days. 3/18/22  Yes Janna Mo MD   Continuous Blood Gluc Transmit (Dexcom G6 Transmitter) misc 1 kit Daily. 3/18/22  Yes Janna Mo MD   Euthyrox 75 MCG tablet Take 1 tablet by mouth once daily 10/27/21  Yes Beba Grant MD   losartan (COZAAR) 25 MG tablet TAKE 1 TABLET BY MOUTH EVERY OTHER DAY 11/10/21  Yes Cynthia Bustlilo JhoanaBECKY   Omega-3 Fatty Acids (fish oil) 1000 MG capsule capsule Take  by mouth.   Yes ProviderSerena MD   Risankizumab-rzaa (SKYRIZI, 150 MG DOSE, SC) Inject  under the skin into the appropriate area as directed. Once every 3 months   Yes Serena Matute MD   sertraline (ZOLOFT) 50 MG tablet Take 50 mg by mouth.   Yes Serena Matute MD   Turmeric 500 MG capsule Take  by mouth.   Yes ProviderSerena MD   furosemide (Lasix) 20 MG tablet Take 1 tablet by mouth Daily. 3/18/22   Janna Mo MD        Review of Systems   Constitutional: Negative for fatigue.   Respiratory: Negative for cough and shortness of breath.    Cardiovascular: Negative for chest pain, palpitations and leg swelling.   Neurological: Negative for dizziness.        Objective     /60   Pulse 64   Ht 167.6 cm (66\")   Wt 71.7 kg (158 lb)   BMI 25.50 kg/m²       Physical Exam  Constitutional:       General: He is awake.      Appearance: Normal appearance.   Neck:      Thyroid: No thyromegaly.      Vascular: No carotid bruit or JVD.   Cardiovascular:      Rate and Rhythm: Normal rate and regular rhythm.      Chest Wall: PMI is not displaced.      Pulses: Normal pulses.      Heart sounds: S1 normal and S2 normal. Murmur heard.    Systolic murmur is present.    No friction rub. No gallop. No S3 or S4 sounds.   Pulmonary:      Effort: Pulmonary effort is normal.      Breath sounds: Normal breath " sounds and air entry. No wheezing, rhonchi or rales.   Abdominal:      General: Bowel sounds are normal.      Palpations: Abdomen is soft. There is no mass.      Tenderness: There is no abdominal tenderness.   Musculoskeletal:      Cervical back: Neck supple.      Right lower leg: No edema.      Left lower leg: No edema.   Neurological:      Mental Status: He is alert and oriented to person, place, and time.   Psychiatric:         Mood and Affect: Mood normal.         Behavior: Behavior is cooperative.       No results found for: PROBNP, BNP  CMP    CMP 1/24/22 2/21/22 2/23/22   Glucose 153 (A) 71 76   BUN 39 (A) 39 (A) 39 (A)   Creatinine 2.58 (A) 2.82 (A) 2.27 (A)   eGFR Non African Am 24 (A) 22 (A) 28 (A)   eGFR  Am 30 (A) 27 (A) 34 (A)   Sodium 138 142 140   Potassium 4.2 5.3 (A) 5.2   Chloride 106 115 (A) 109 (A)   Calcium 9.8 9.1 9.6   Albumin 3.80  3.50   Total Bilirubin 0.6     Alkaline Phosphatase 53     AST (SGOT) 18     ALT (SGPT) 21     (A) Abnormal value            CBC w/diff    CBC w/Diff 1/24/22 2/23/22 3/24/22   WBC 6.21 6.20 6.29   RBC 3.89 (A) 3.89 (A) 3.61 (A)   Hemoglobin 11.4 (A) 11.2 (A) 10.4 (A)   Hematocrit 36.1 (A) 35.1 (A) 33.8 (A)   MCV 92.8 90.2 93.6   MCH 29.3 28.8 28.8   MCHC 31.6 31.9 30.8 (A)   RDW 13.8 13.6 14.9   Platelets 104 (A) 100 (A) 110 (A)   Neutrophil Rel % 56.5 47.6 57.7   Lymphocyte Rel % 33.7 41.1 32.9   Monocyte Rel % 6.4 6.6 5.6   Eosinophil Rel % 2.7 3.9 3.5   Basophil Rel % 0.5 0.6 0.3   (A) Abnormal value             Lipid Panel    Lipid Panel 11/10/21 1/24/22   Total Cholesterol 98 123   Triglycerides 134 64   HDL Cholesterol 35 (A) 62 (A)   VLDL Cholesterol 24 14   LDL Cholesterol  39 47   LDL/HDL Ratio 1.03 0.78   (A) Abnormal value             Lab Results   Component Value Date    TSH 1.160 11/10/2021    TSH 3.730 10/01/2021    TSH 5.040 (H) 05/22/2020      Lab Results   Component Value Date    FREET4 1.56 11/10/2021    FREET4 1.1 05/22/2020    FREET4 1.2  06/17/2019      No results found for: DDIMERQUANT  Magnesium   Date Value Ref Range Status   01/24/2022 1.6 1.6 - 2.4 mg/dL Final      No results found for: DIGOXIN   A1C Last 3 Results    HGBA1C Last 3 Results 11/10/21   Hemoglobin A1C 6.34 (A)   (A) Abnormal value                     Result Review :                           Assessment and Plan {CC Problem List  Visit Diagnosis  ROS  Review (Popup)  Health Maintenance  Quality  BestPractice  Medications  SmartSets  SnapShot Encounters  Media :23}       Diagnoses and all orders for this visit:    1. Essential hypertension (Primary)  Assessment & Plan:  His furosemide was stopped by his PCP because of worsening renal function.  This led to him becoming somewhat hypertensive.  We therefore increase his carvedilol to 2 tablets in the morning and 1 at night.  I have suggested to him that he should reverse that and take 2 tablets at night and 1 in the morning.  He will do that and continue to monitor his blood pressure.    Orders:  -     Lipid Panel; Future  -     Comprehensive Metabolic Panel; Future  -     Magnesium; Future  -     Lipid Panel; Future  -     Comprehensive Metabolic Panel; Future  -     Magnesium; Future    2. Hyperlipidemia LDL goal <70  Assessment & Plan:  His lipids are at goal with HDL of 62 and LDL of 47.  He will continue with atorvastatin 40 mg a day and his exercise program    Orders:  -     Lipid Panel; Future  -     Comprehensive Metabolic Panel; Future  -     Magnesium; Future  -     Lipid Panel; Future  -     Comprehensive Metabolic Panel; Future  -     Magnesium; Future    3. CKD (chronic kidney disease) stage 4, GFR 15-29 ml/min ([facility])  Assessment & Plan:  He has been in type IV CKD for quite some time now.  His renal function recently got a little worse and his diuretic was discontinued.  He has not had any fluid retention and he will continue to stay off the furosemide.  If his blood pressure necessitates we can put him on a  small dose of chlorthalidone.  He will continue to monitor his blood pressures at at home    Orders:  -     Lipid Panel; Future  -     Comprehensive Metabolic Panel; Future  -     Magnesium; Future  -     Lipid Panel; Future  -     Comprehensive Metabolic Panel; Future  -     Magnesium; Future    4. Acquired hypothyroidism  -     Lipid Panel; Future  -     Comprehensive Metabolic Panel; Future  -     Magnesium; Future  -     Lipid Panel; Future  -     Comprehensive Metabolic Panel; Future  -     Magnesium; Future    Other orders  -     losartan (COZAAR) 25 MG tablet; Take 1 tablet by mouth Daily.  Dispense: 90 tablet; Refill: 3  -     carvedilol (COREG) 12.5 MG tablet; Take 1 tablet by mouth 2 (Two) Times a Day. 1  Tab in am and 2 tabs in pm  Dispense: 270 tablet; Refill: 3          Follow Up     Return in about 9 months (around 12/30/2022) for EKG with next office visit.    Patient was given instructions and counseling regarding his condition or for health maintenance advice. Please see specific information pulled into the AVS if appropriate.     Beba Grant MD  03/30/22 09:43 EDT

## 2022-04-08 RX ORDER — LEVOTHYROXINE SODIUM 75 UG/1
TABLET ORAL
Qty: 90 TABLET | Refills: 3 | Status: SHIPPED | OUTPATIENT
Start: 2022-04-08

## 2022-04-11 ENCOUNTER — OFFICE VISIT (OUTPATIENT)
Dept: INTERNAL MEDICINE | Facility: CLINIC | Age: 77
End: 2022-04-11

## 2022-04-11 VITALS
DIASTOLIC BLOOD PRESSURE: 85 MMHG | HEART RATE: 85 BPM | TEMPERATURE: 98.1 F | OXYGEN SATURATION: 100 % | RESPIRATION RATE: 18 BRPM | SYSTOLIC BLOOD PRESSURE: 135 MMHG | HEIGHT: 66 IN | WEIGHT: 159.4 LBS | BODY MASS INDEX: 25.62 KG/M2

## 2022-04-11 DIAGNOSIS — N18.4 CKD (CHRONIC KIDNEY DISEASE) STAGE 4, GFR 15-29 ML/MIN: ICD-10-CM

## 2022-04-11 DIAGNOSIS — I10 ESSENTIAL HYPERTENSION: Primary | ICD-10-CM

## 2022-04-11 DIAGNOSIS — E11.9 TYPE 2 DIABETES MELLITUS WITHOUT COMPLICATION, WITHOUT LONG-TERM CURRENT USE OF INSULIN: ICD-10-CM

## 2022-04-11 DIAGNOSIS — E03.9 ACQUIRED HYPOTHYROIDISM: ICD-10-CM

## 2022-04-11 DIAGNOSIS — E78.5 HYPERLIPIDEMIA LDL GOAL <70: ICD-10-CM

## 2022-04-11 LAB
BASOPHILS # BLD AUTO: 0.03 10*3/MM3 (ref 0–0.2)
BASOPHILS NFR BLD AUTO: 0.5 % (ref 0–1.5)
CHOLEST SERPL-MCNC: 120 MG/DL (ref 0–200)
DEPRECATED RDW RBC AUTO: 46.7 FL (ref 37–54)
EOSINOPHIL # BLD AUTO: 0.21 10*3/MM3 (ref 0–0.4)
EOSINOPHIL NFR BLD AUTO: 3.8 % (ref 0.3–6.2)
ERYTHROCYTE [DISTWIDTH] IN BLOOD BY AUTOMATED COUNT: 14.2 % (ref 12.3–15.4)
HBA1C MFR BLD: 5.9 % (ref 4.8–5.6)
HCT VFR BLD AUTO: 32 % (ref 37.5–51)
HDLC SERPL-MCNC: 54 MG/DL (ref 40–60)
HGB BLD-MCNC: 10.1 G/DL (ref 13–17.7)
LDLC SERPL CALC-MCNC: 55 MG/DL (ref 0–100)
LDLC/HDLC SERPL: 1.07 {RATIO}
LYMPHOCYTES # BLD AUTO: 1.87 10*3/MM3 (ref 0.7–3.1)
LYMPHOCYTES NFR BLD AUTO: 33.4 % (ref 19.6–45.3)
MCH RBC QN AUTO: 29 PG (ref 26.6–33)
MCHC RBC AUTO-ENTMCNC: 31.6 G/DL (ref 31.5–35.7)
MCV RBC AUTO: 92 FL (ref 79–97)
MONOCYTES # BLD AUTO: 0.36 10*3/MM3 (ref 0.1–0.9)
MONOCYTES NFR BLD AUTO: 6.4 % (ref 5–12)
NEUTROPHILS NFR BLD AUTO: 3.12 10*3/MM3 (ref 1.7–7)
NEUTROPHILS NFR BLD AUTO: 55.7 % (ref 42.7–76)
PLATELET # BLD AUTO: 100 10*3/MM3 (ref 140–450)
PMV BLD AUTO: 13.4 FL (ref 6–12)
RBC # BLD AUTO: 3.48 10*6/MM3 (ref 4.14–5.8)
T4 FREE SERPL-MCNC: 1.18 NG/DL (ref 0.93–1.7)
TRIGL SERPL-MCNC: 42 MG/DL (ref 0–150)
TSH SERPL DL<=0.05 MIU/L-ACNC: 11.8 UIU/ML (ref 0.27–4.2)
VLDLC SERPL-MCNC: 11 MG/DL (ref 5–40)
WBC NRBC COR # BLD: 5.6 10*3/MM3 (ref 3.4–10.8)

## 2022-04-11 PROCEDURE — 84443 ASSAY THYROID STIM HORMONE: CPT | Performed by: INTERNAL MEDICINE

## 2022-04-11 PROCEDURE — 99214 OFFICE O/P EST MOD 30 MIN: CPT | Performed by: INTERNAL MEDICINE

## 2022-04-11 PROCEDURE — 80061 LIPID PANEL: CPT | Performed by: INTERNAL MEDICINE

## 2022-04-11 PROCEDURE — 83036 HEMOGLOBIN GLYCOSYLATED A1C: CPT | Performed by: INTERNAL MEDICINE

## 2022-04-11 PROCEDURE — 84439 ASSAY OF FREE THYROXINE: CPT | Performed by: INTERNAL MEDICINE

## 2022-04-11 PROCEDURE — 85025 COMPLETE CBC W/AUTO DIFF WBC: CPT | Performed by: INTERNAL MEDICINE

## 2022-04-11 PROCEDURE — 80053 COMPREHEN METABOLIC PANEL: CPT | Performed by: INTERNAL MEDICINE

## 2022-04-11 PROCEDURE — 36415 COLL VENOUS BLD VENIPUNCTURE: CPT | Performed by: INTERNAL MEDICINE

## 2022-04-11 RX ORDER — INSULIN GLARGINE 100 [IU]/ML
30 INJECTION, SOLUTION SUBCUTANEOUS DAILY
COMMUNITY
Start: 2022-03-21 | End: 2022-06-15 | Stop reason: SDUPTHER

## 2022-04-11 NOTE — PROGRESS NOTES
"Chief Complaint  CKD (Follow-up)    Subjective          Nelson Wang presents to [facility]  History of Present Illness     States that he is feeling a little withdrawn  More anhedonia then previous.  He feels like he is dealing with it ok.  He wants to just manage this with his Buddism because he doesn't want to increase meds as he doesn't want to dull his emotions.    He has felt well off the lasix other than 2lb weight gain.  He has been off of it for about 2 weeks now.    His bp is a little high at home  Usually 120-130/60-70, nothing much migher    No chest pain  No trouble breathing    Has seen Drs Juanita, Gloria and Suly and they are all just monitoring for now.  Reviewed notes we have.      Objective   Vital Signs:   /85   Pulse 85   Temp 98.1 °F (36.7 °C) (Oral)   Resp 18   Ht 167.6 cm (65.98\")   Wt 72.3 kg (159 lb 6.4 oz)   SpO2 100%   BMI 25.74 kg/m²     Physical Exam  Vitals reviewed.   Constitutional:       Appearance: Normal appearance. He is well-developed.   HENT:      Head: Normocephalic and atraumatic.      Right Ear: External ear normal.      Left Ear: External ear normal.   Eyes:      Conjunctiva/sclera: Conjunctivae normal.      Pupils: Pupils are equal, round, and reactive to light.   Cardiovascular:      Rate and Rhythm: Normal rate and regular rhythm.      Heart sounds: No murmur heard.    No friction rub. No gallop.   Pulmonary:      Effort: Pulmonary effort is normal.      Breath sounds: Normal breath sounds. No wheezing or rhonchi.   Skin:     General: Skin is warm and dry.   Neurological:      Mental Status: He is alert and oriented to person, place, and time.      Cranial Nerves: No cranial nerve deficit.   Psychiatric:         Mood and Affect: Affect normal.         Behavior: Behavior normal.         Thought Content: Thought content normal.        Result Review :       Common labs    Common Labsle 2/21/22 2/23/22 2/23/22 " 3/24/22     0929 0929    Glucose 71  76    BUN 39 (A)  39 (A)    Creatinine 2.82 (A)  2.27 (A)    eGFR Non African Am 22 (A)  28 (A)    eGFR  Am 27 (A)  34 (A)    Sodium 142  140    Potassium 5.3 (A)  5.2    Chloride 115 (A)  109 (A)    Calcium 9.1  9.6    Albumin   3.50    WBC  6.20  6.29   Hemoglobin  11.2 (A)  10.4 (A)   Hematocrit  35.1 (A)  33.8 (A)   Platelets  100 (A)  110 (A)   (A) Abnormal value                     Procedures        Assessment and Plan    Diagnoses and all orders for this visit:    1. Essential hypertension (Primary)  Comments:  repeat bp here good, he will montior at home, if going up will increase am coreg to 25mg; if hr low will add amlodipine 2.5mg daily or chlorthalidone per cards  Orders:  -     CBC & Differential  -     Comprehensive Metabolic Panel  -     TSH    2. Hyperlipidemia LDL goal <70  Comments:  will check labs and adjust as needed  Orders:  -     Lipid Panel    3. Acquired hypothyroidism  Comments:  will check labs and adjust as needed  Orders:  -     T4, Free    4. Type 2 diabetes mellitus without complication, without long-term current use of insulin ([facility])  Comments:  will check sugar and adjust meds as needed  Orders:  -     Hemoglobin A1c    5. CKD (chronic kidney disease) stage 4, GFR 15-29 ml/min ([facility])  Comments:  has been a bit more stable, cont to monitor close, hopefully stopping lasix will help  Orders:  -     Hemoglobin A1c                  Follow Up   Return for Next scheduled follow up.  Patient was given instructions and counseling regarding his condition or for health maintenance advice. Please see specific information pulled into the AVS if appropriate.

## 2022-04-12 ENCOUNTER — TELEPHONE (OUTPATIENT)
Dept: INTERNAL MEDICINE | Facility: CLINIC | Age: 77
End: 2022-04-12

## 2022-04-12 DIAGNOSIS — E87.5 HYPERKALEMIA: Primary | ICD-10-CM

## 2022-04-12 LAB
ALBUMIN SERPL-MCNC: 4 G/DL (ref 3.5–5.2)
ALBUMIN/GLOB SERPL: 1.5 G/DL
ALP SERPL-CCNC: 57 U/L (ref 39–117)
ALT SERPL W P-5'-P-CCNC: 16 U/L (ref 1–41)
ANION GAP SERPL CALCULATED.3IONS-SCNC: 9.7 MMOL/L (ref 5–15)
AST SERPL-CCNC: 17 U/L (ref 1–40)
BILIRUB SERPL-MCNC: 0.3 MG/DL (ref 0–1.2)
BUN SERPL-MCNC: 37 MG/DL (ref 8–23)
BUN/CREAT SERPL: 14.7 (ref 7–25)
CALCIUM SPEC-SCNC: 9.9 MG/DL (ref 8.6–10.5)
CHLORIDE SERPL-SCNC: 115 MMOL/L (ref 98–107)
CO2 SERPL-SCNC: 19.3 MMOL/L (ref 22–29)
CREAT SERPL-MCNC: 2.51 MG/DL (ref 0.76–1.27)
EGFRCR SERPLBLD CKD-EPI 2021: 25.9 ML/MIN/1.73
GLOBULIN UR ELPH-MCNC: 2.7 GM/DL
GLUCOSE SERPL-MCNC: 55 MG/DL (ref 65–99)
POTASSIUM SERPL-SCNC: 6.4 MMOL/L (ref 3.5–5.2)
PROT SERPL-MCNC: 6.7 G/DL (ref 6–8.5)
SODIUM SERPL-SCNC: 144 MMOL/L (ref 136–145)

## 2022-04-12 NOTE — TELEPHONE ENCOUNTER
Can someone call him and have him get this redrawn ASAP?  I will place the order.  I think it is likely just a lab error from hemolysis of his blood cells but want to be sure. If he has chest pain or palpitations he should go to the ER for a redraw.      Patient is aware of his levels and is going to go in the morning to get his labs redrawn.

## 2022-04-13 ENCOUNTER — LAB (OUTPATIENT)
Dept: LAB | Facility: HOSPITAL | Age: 77
End: 2022-04-13

## 2022-04-13 DIAGNOSIS — E03.9 ACQUIRED HYPOTHYROIDISM: ICD-10-CM

## 2022-04-13 DIAGNOSIS — E03.9 ACQUIRED HYPOTHYROIDISM: Primary | ICD-10-CM

## 2022-04-13 LAB
ANION GAP SERPL CALCULATED.3IONS-SCNC: 11.2 MMOL/L (ref 5–15)
BUN SERPL-MCNC: 42 MG/DL (ref 8–23)
BUN/CREAT SERPL: 14.4 (ref 7–25)
CALCIUM SPEC-SCNC: 9.2 MG/DL (ref 8.6–10.5)
CHLORIDE SERPL-SCNC: 111 MMOL/L (ref 98–107)
CO2 SERPL-SCNC: 21.8 MMOL/L (ref 22–29)
CREAT SERPL-MCNC: 2.91 MG/DL (ref 0.76–1.27)
EGFRCR SERPLBLD CKD-EPI 2021: 21.6 ML/MIN/1.73
GLUCOSE SERPL-MCNC: 65 MG/DL (ref 65–99)
POTASSIUM SERPL-SCNC: 5.2 MMOL/L (ref 3.5–5.2)
SODIUM SERPL-SCNC: 144 MMOL/L (ref 136–145)

## 2022-04-13 PROCEDURE — 36415 COLL VENOUS BLD VENIPUNCTURE: CPT

## 2022-04-13 PROCEDURE — 86800 THYROGLOBULIN ANTIBODY: CPT

## 2022-04-13 PROCEDURE — 80048 BASIC METABOLIC PNL TOTAL CA: CPT | Performed by: INTERNAL MEDICINE

## 2022-04-13 PROCEDURE — 86376 MICROSOMAL ANTIBODY EACH: CPT

## 2022-04-14 ENCOUNTER — TELEPHONE (OUTPATIENT)
Dept: INTERNAL MEDICINE | Facility: CLINIC | Age: 77
End: 2022-04-14

## 2022-04-14 LAB
THYROGLOB AB SERPL-ACNC: <1 IU/ML (ref 0–0.9)
THYROPEROXIDASE AB SERPL-ACNC: <8 IU/ML (ref 0–34)

## 2022-04-28 ENCOUNTER — TELEPHONE (OUTPATIENT)
Dept: INTERNAL MEDICINE | Facility: CLINIC | Age: 77
End: 2022-04-28

## 2022-05-19 ENCOUNTER — TELEPHONE (OUTPATIENT)
Dept: INTERNAL MEDICINE | Facility: CLINIC | Age: 77
End: 2022-05-19

## 2022-05-19 NOTE — TELEPHONE ENCOUNTER
Caller: Nelson Wang    Relationship to patient: Self    Best call back number: [phone]    Chief complaint: 2 MONTH FOLLOW UP    Type of visit: OFFICE VISIT    Requested date: AFTER 6.2.22    If rescheduling, when is the original appointment: 6.1.22    Additional notes:PATIENT IS IN [place] AND WOULD LIKE TO RESCHEDULE HIS APPOINTMENT FROM 6.1.22. TO A DATE AFTER 6.2.22. OFFERED APPOINTMENT WITH DIFFERENT PROVIDER AND HE DECLINED.

## 2022-06-07 ENCOUNTER — LAB (OUTPATIENT)
Dept: LAB | Facility: HOSPITAL | Age: 77
End: 2022-06-07

## 2022-06-07 ENCOUNTER — TRANSCRIBE ORDERS (OUTPATIENT)
Dept: PULMONOLOGY | Facility: CLINIC | Age: 77
End: 2022-06-07

## 2022-06-07 DIAGNOSIS — E55.9 VITAMIN D DEFICIENCY: ICD-10-CM

## 2022-06-07 DIAGNOSIS — N18.4 CHRONIC KIDNEY DISEASE, STAGE IV (SEVERE): Primary | ICD-10-CM

## 2022-06-07 DIAGNOSIS — N18.4 CHRONIC KIDNEY DISEASE, STAGE IV (SEVERE): ICD-10-CM

## 2022-06-07 LAB
25(OH)D3 SERPL-MCNC: 48 NG/ML (ref 30–100)
ALBUMIN SERPL-MCNC: 3.8 G/DL (ref 3.5–5.2)
ANION GAP SERPL CALCULATED.3IONS-SCNC: 11.2 MMOL/L (ref 5–15)
BACTERIA UR QL AUTO: ABNORMAL /HPF
BASOPHILS # BLD AUTO: 0.02 10*3/MM3 (ref 0–0.2)
BASOPHILS NFR BLD AUTO: 0.3 % (ref 0–1.5)
BILIRUB UR QL STRIP: NEGATIVE
BUN SERPL-MCNC: 48 MG/DL (ref 8–23)
BUN/CREAT SERPL: 15.7 (ref 7–25)
CALCIUM SPEC-SCNC: 8.6 MG/DL (ref 8.6–10.5)
CHLORIDE SERPL-SCNC: 115 MMOL/L (ref 98–107)
CLARITY UR: CLEAR
CO2 SERPL-SCNC: 15.8 MMOL/L (ref 22–29)
COLOR UR: YELLOW
CREAT SERPL-MCNC: 3.06 MG/DL (ref 0.76–1.27)
CREAT UR-MCNC: 65.3 MG/DL
DEPRECATED RDW RBC AUTO: 46.3 FL (ref 37–54)
EGFRCR SERPLBLD CKD-EPI 2021: 20.4 ML/MIN/1.73
EOSINOPHIL # BLD AUTO: 0.18 10*3/MM3 (ref 0–0.4)
EOSINOPHIL NFR BLD AUTO: 2.8 % (ref 0.3–6.2)
ERYTHROCYTE [DISTWIDTH] IN BLOOD BY AUTOMATED COUNT: 13.5 % (ref 12.3–15.4)
GLUCOSE SERPL-MCNC: 86 MG/DL (ref 65–99)
GLUCOSE UR STRIP-MCNC: NEGATIVE MG/DL
HCT VFR BLD AUTO: 30.4 % (ref 37.5–51)
HGB BLD-MCNC: 9 G/DL (ref 13–17.7)
HGB UR QL STRIP.AUTO: NEGATIVE
HYALINE CASTS UR QL AUTO: ABNORMAL /LPF
IMM GRANULOCYTES # BLD AUTO: 0.01 10*3/MM3 (ref 0–0.05)
IMM GRANULOCYTES NFR BLD AUTO: 0.2 % (ref 0–0.5)
KETONES UR QL STRIP: NEGATIVE
LEUKOCYTE ESTERASE UR QL STRIP.AUTO: NEGATIVE
LYMPHOCYTES # BLD AUTO: 2.57 10*3/MM3 (ref 0.7–3.1)
LYMPHOCYTES NFR BLD AUTO: 40 % (ref 19.6–45.3)
MCH RBC QN AUTO: 28.2 PG (ref 26.6–33)
MCHC RBC AUTO-ENTMCNC: 29.6 G/DL (ref 31.5–35.7)
MCV RBC AUTO: 95.3 FL (ref 79–97)
MONOCYTES # BLD AUTO: 0.42 10*3/MM3 (ref 0.1–0.9)
MONOCYTES NFR BLD AUTO: 6.5 % (ref 5–12)
NEUTROPHILS NFR BLD AUTO: 3.22 10*3/MM3 (ref 1.7–7)
NEUTROPHILS NFR BLD AUTO: 50.2 % (ref 42.7–76)
NITRITE UR QL STRIP: NEGATIVE
NRBC BLD AUTO-RTO: 0 /100 WBC (ref 0–0.2)
PH UR STRIP.AUTO: 6 [PH] (ref 5–8)
PHOSPHATE SERPL-MCNC: 4.2 MG/DL (ref 2.5–4.5)
PLATELET # BLD AUTO: 99 10*3/MM3 (ref 140–450)
PMV BLD AUTO: 12.6 FL (ref 6–12)
POTASSIUM SERPL-SCNC: 5.2 MMOL/L (ref 3.5–5.2)
PROT ?TM UR-MCNC: 356.6 MG/DL
PROT UR QL STRIP: ABNORMAL
PROT/CREAT UR: 5.46 MG/G{CREAT}
PTH-INTACT SERPL-MCNC: 53.6 PG/ML (ref 15–65)
RBC # BLD AUTO: 3.19 10*6/MM3 (ref 4.14–5.8)
RBC # UR STRIP: ABNORMAL /HPF
REF LAB TEST METHOD: ABNORMAL
SODIUM SERPL-SCNC: 142 MMOL/L (ref 136–145)
SP GR UR STRIP: 1.01 (ref 1–1.03)
SQUAMOUS #/AREA URNS HPF: ABNORMAL /HPF
UROBILINOGEN UR QL STRIP: ABNORMAL
WBC # UR STRIP: ABNORMAL /HPF
WBC NRBC COR # BLD: 6.42 10*3/MM3 (ref 3.4–10.8)

## 2022-06-07 PROCEDURE — 81001 URINALYSIS AUTO W/SCOPE: CPT

## 2022-06-07 PROCEDURE — 84156 ASSAY OF PROTEIN URINE: CPT

## 2022-06-07 PROCEDURE — 85025 COMPLETE CBC W/AUTO DIFF WBC: CPT

## 2022-06-07 PROCEDURE — 83970 ASSAY OF PARATHORMONE: CPT

## 2022-06-07 PROCEDURE — 82570 ASSAY OF URINE CREATININE: CPT

## 2022-06-07 PROCEDURE — 36415 COLL VENOUS BLD VENIPUNCTURE: CPT

## 2022-06-07 PROCEDURE — 82306 VITAMIN D 25 HYDROXY: CPT

## 2022-06-07 PROCEDURE — 80069 RENAL FUNCTION PANEL: CPT

## 2022-06-15 ENCOUNTER — TELEPHONE (OUTPATIENT)
Dept: INTERNAL MEDICINE | Facility: CLINIC | Age: 77
End: 2022-06-15

## 2022-06-15 ENCOUNTER — OFFICE VISIT (OUTPATIENT)
Dept: INTERNAL MEDICINE | Facility: CLINIC | Age: 77
End: 2022-06-15

## 2022-06-15 VITALS
DIASTOLIC BLOOD PRESSURE: 72 MMHG | HEIGHT: 65 IN | WEIGHT: 150.6 LBS | TEMPERATURE: 97.4 F | OXYGEN SATURATION: 100 % | BODY MASS INDEX: 25.09 KG/M2 | HEART RATE: 62 BPM | SYSTOLIC BLOOD PRESSURE: 162 MMHG

## 2022-06-15 DIAGNOSIS — I10 ESSENTIAL HYPERTENSION: ICD-10-CM

## 2022-06-15 DIAGNOSIS — E03.9 ACQUIRED HYPOTHYROIDISM: ICD-10-CM

## 2022-06-15 DIAGNOSIS — E11.9 TYPE 2 DIABETES MELLITUS WITHOUT COMPLICATION, WITHOUT LONG-TERM CURRENT USE OF INSULIN: ICD-10-CM

## 2022-06-15 DIAGNOSIS — N18.4 ANEMIA DUE TO STAGE 4 CHRONIC KIDNEY DISEASE: ICD-10-CM

## 2022-06-15 DIAGNOSIS — N18.4 CKD (CHRONIC KIDNEY DISEASE) STAGE 4, GFR 15-29 ML/MIN: Primary | ICD-10-CM

## 2022-06-15 DIAGNOSIS — D63.1 ANEMIA DUE TO STAGE 4 CHRONIC KIDNEY DISEASE: ICD-10-CM

## 2022-06-15 LAB
ALBUMIN SERPL-MCNC: 3.9 G/DL (ref 3.5–5.2)
ALBUMIN/GLOB SERPL: 1.6 G/DL
ALP SERPL-CCNC: 71 U/L (ref 39–117)
ALT SERPL W P-5'-P-CCNC: 21 U/L (ref 1–41)
ANION GAP SERPL CALCULATED.3IONS-SCNC: 9.7 MMOL/L (ref 5–15)
AST SERPL-CCNC: 25 U/L (ref 1–40)
BASOPHILS # BLD AUTO: 0.02 10*3/MM3 (ref 0–0.2)
BASOPHILS NFR BLD AUTO: 0.3 % (ref 0–1.5)
BILIRUB SERPL-MCNC: 0.5 MG/DL (ref 0–1.2)
BUN SERPL-MCNC: 45 MG/DL (ref 8–23)
BUN/CREAT SERPL: 14.6 (ref 7–25)
CALCIUM SPEC-SCNC: 8.7 MG/DL (ref 8.6–10.5)
CHLORIDE SERPL-SCNC: 113 MMOL/L (ref 98–107)
CHOLEST SERPL-MCNC: 124 MG/DL (ref 0–200)
CO2 SERPL-SCNC: 19.3 MMOL/L (ref 22–29)
CREAT SERPL-MCNC: 3.08 MG/DL (ref 0.76–1.27)
DEPRECATED RDW RBC AUTO: 44.4 FL (ref 37–54)
EGFRCR SERPLBLD CKD-EPI 2021: 20.2 ML/MIN/1.73
EOSINOPHIL # BLD AUTO: 0.17 10*3/MM3 (ref 0–0.4)
EOSINOPHIL NFR BLD AUTO: 2.7 % (ref 0.3–6.2)
ERYTHROCYTE [DISTWIDTH] IN BLOOD BY AUTOMATED COUNT: 13.5 % (ref 12.3–15.4)
GLOBULIN UR ELPH-MCNC: 2.5 GM/DL
GLUCOSE SERPL-MCNC: 79 MG/DL (ref 65–99)
HCT VFR BLD AUTO: 29.9 % (ref 37.5–51)
HDLC SERPL-MCNC: 80 MG/DL (ref 40–60)
HGB BLD-MCNC: 9.2 G/DL (ref 13–17.7)
IMM GRANULOCYTES # BLD AUTO: 0.02 10*3/MM3 (ref 0–0.05)
IMM GRANULOCYTES NFR BLD AUTO: 0.3 % (ref 0–0.5)
LDLC SERPL CALC-MCNC: 35 MG/DL (ref 0–100)
LDLC/HDLC SERPL: 0.48 {RATIO}
LYMPHOCYTES # BLD AUTO: 2.22 10*3/MM3 (ref 0.7–3.1)
LYMPHOCYTES NFR BLD AUTO: 35 % (ref 19.6–45.3)
MCH RBC QN AUTO: 28.2 PG (ref 26.6–33)
MCHC RBC AUTO-ENTMCNC: 30.8 G/DL (ref 31.5–35.7)
MCV RBC AUTO: 91.7 FL (ref 79–97)
MONOCYTES # BLD AUTO: 0.48 10*3/MM3 (ref 0.1–0.9)
MONOCYTES NFR BLD AUTO: 7.6 % (ref 5–12)
NEUTROPHILS NFR BLD AUTO: 3.43 10*3/MM3 (ref 1.7–7)
NEUTROPHILS NFR BLD AUTO: 54.1 % (ref 42.7–76)
NRBC BLD AUTO-RTO: 0 /100 WBC (ref 0–0.2)
PLATELET # BLD AUTO: 99 10*3/MM3 (ref 140–450)
PMV BLD AUTO: 13.4 FL (ref 6–12)
POTASSIUM SERPL-SCNC: 6.2 MMOL/L (ref 3.5–5.2)
PROT SERPL-MCNC: 6.4 G/DL (ref 6–8.5)
RBC # BLD AUTO: 3.26 10*6/MM3 (ref 4.14–5.8)
SODIUM SERPL-SCNC: 142 MMOL/L (ref 136–145)
T4 FREE SERPL-MCNC: 1.01 NG/DL (ref 0.93–1.7)
TRIGL SERPL-MCNC: 27 MG/DL (ref 0–150)
TSH SERPL DL<=0.05 MIU/L-ACNC: 8.64 UIU/ML (ref 0.27–4.2)
VLDLC SERPL-MCNC: 9 MG/DL (ref 5–40)
WBC NRBC COR # BLD: 6.34 10*3/MM3 (ref 3.4–10.8)

## 2022-06-15 PROCEDURE — 36415 COLL VENOUS BLD VENIPUNCTURE: CPT | Performed by: INTERNAL MEDICINE

## 2022-06-15 PROCEDURE — 80061 LIPID PANEL: CPT | Performed by: INTERNAL MEDICINE

## 2022-06-15 PROCEDURE — 99214 OFFICE O/P EST MOD 30 MIN: CPT | Performed by: INTERNAL MEDICINE

## 2022-06-15 PROCEDURE — 84443 ASSAY THYROID STIM HORMONE: CPT | Performed by: INTERNAL MEDICINE

## 2022-06-15 PROCEDURE — 85025 COMPLETE CBC W/AUTO DIFF WBC: CPT | Performed by: INTERNAL MEDICINE

## 2022-06-15 PROCEDURE — 80053 COMPREHEN METABOLIC PANEL: CPT | Performed by: INTERNAL MEDICINE

## 2022-06-15 PROCEDURE — 84439 ASSAY OF FREE THYROXINE: CPT | Performed by: INTERNAL MEDICINE

## 2022-06-15 RX ORDER — FUROSEMIDE 40 MG/1
40 TABLET ORAL
COMMUNITY
End: 2022-06-15

## 2022-06-15 RX ORDER — CARVEDILOL 25 MG/1
25 TABLET ORAL 2 TIMES DAILY WITH MEALS
COMMUNITY
Start: 2022-06-09 | End: 2022-09-15 | Stop reason: SDUPTHER

## 2022-06-15 RX ORDER — SODIUM BICARBONATE 650 MG/1
TABLET ORAL
COMMUNITY
Start: 2022-06-10 | End: 2022-09-06 | Stop reason: ALTCHOICE

## 2022-06-15 RX ORDER — INSULIN GLARGINE 100 [IU]/ML
30 INJECTION, SOLUTION SUBCUTANEOUS DAILY
Qty: 9 ML | Refills: 1 | Status: SHIPPED | OUTPATIENT
Start: 2022-06-15 | End: 2022-06-16 | Stop reason: ALTCHOICE

## 2022-06-15 RX ORDER — SERTRALINE HYDROCHLORIDE 100 MG/1
100 TABLET, FILM COATED ORAL DAILY
COMMUNITY
Start: 2022-06-11 | End: 2022-12-05 | Stop reason: SDUPTHER

## 2022-06-15 RX ORDER — AMLODIPINE BESYLATE 5 MG/1
5 TABLET ORAL DAILY
Qty: 90 TABLET | Refills: 1 | Status: SHIPPED | OUTPATIENT
Start: 2022-06-15 | End: 2022-09-06 | Stop reason: ALTCHOICE

## 2022-06-15 NOTE — TELEPHONE ENCOUNTER
Pharmacy Name:    [facility] [place], [place] - [address]  [phone] [facility] [phone]   [phone]    Pharmacy representative name: ED    Pharmacy representative phone number: [phone]    What medication are you calling in regards to:   insulin glargine (Lantus) 100 UNIT/ML injection    What question does the pharmacy have:   [facility] [place] [phone] [facility] [phone]   [phone]    Who is the provider that prescribed the medication: BISMARK    Additional notes:   ED WITH [facility] CALLED TO LET US KNOW THAT PATIENT TOLD THEM THAT IT WAS SUPPOSED TO BE THE PEN AND NOT THE VIALS.

## 2022-06-15 NOTE — PROGRESS NOTES
"Chief Complaint  Hypertension and Chronic Kidney Disease    Subjective     {Problem List  Visit Diagnosis   Encounters  Notes  Medications  Labs  Result Review Imaging  Media :23}     Nelson Wang presents to [facility]  History of Present Illness     States that he has spoken with Dr. Castro  He was started on sodium bicarb through Dr. Castro's office  It was increased, but he decreased it again    He has an appt in July for nephro at [facility]    Blood pressure went up after starting sodium bicarb which is why he decreased it  Has been around 140 at home    No chest pain  No trouble breathing    Reviewed recent labs that showed worsening anemia and worsening renal function; but potassium was back to normal    Objective   Vital Signs:   /72 (BP Location: Left arm, Patient Position: Sitting, Cuff Size: Adult)   Pulse 62   Temp 97.4 °F (36.3 °C) (Temporal)   Ht 165.1 cm (65\")   Wt 68.3 kg (150 lb 9.6 oz)   SpO2 100%   BMI 25.06 kg/m²     Physical Exam  Vitals reviewed.   Constitutional:       Appearance: Normal appearance. He is well-developed.   HENT:      Head: Normocephalic and atraumatic.      Right Ear: External ear normal.      Left Ear: External ear normal.   Eyes:      Conjunctiva/sclera: Conjunctivae normal.      Pupils: Pupils are equal, round, and reactive to light.   Cardiovascular:      Rate and Rhythm: Normal rate and regular rhythm.      Heart sounds: No murmur heard.    No friction rub. No gallop.   Pulmonary:      Effort: Pulmonary effort is normal.      Breath sounds: Normal breath sounds. No wheezing or rhonchi.   Skin:     General: Skin is warm and dry.   Neurological:      Mental Status: He is alert and oriented to person, place, and time.   Psychiatric:         Mood and Affect: Affect normal.         Behavior: Behavior normal.         Thought Content: Thought content normal.        Result Review :       Common labs    Common Labsle " 4/11/22 4/11/22 4/11/22 4/11/22 4/13/22 6/7/22 6/7/22    1151 1151 1151 1151  1003 1003   Glucose    55 (A) 65  86   BUN    37 (A) 42 (A)  48 (A)   Creatinine    2.51 (A) 2.91 (A)  3.06 (A)   Sodium    144 144  142   Potassium    6.4 (A) 5.2  5.2   Chloride    115 (A) 111 (A)  115 (A)   Calcium    9.9 9.2  8.6   Albumin    4.00   3.80   Total Bilirubin    0.3      Alkaline Phosphatase    57      AST (SGOT)    17      ALT (SGPT)    16      WBC 5.60     6.42    Hemoglobin 10.1 (A)     9.0 (A)    Hematocrit 32.0 (A)     30.4 (A)    Platelets 100 (A)     99 (A)    Total Cholesterol  120        Triglycerides  42        HDL Cholesterol  54        LDL Cholesterol   55        Hemoglobin A1C   5.90 (A)       (A) Abnormal value                     Procedures        Assessment and Plan    Diagnoses and all orders for this visit:    1. CKD (chronic kidney disease) stage 4, GFR 15-29 ml/min ([facility]) (Primary)  Comments:  will check labs today and await [place] 2nd opinion  Orders:  -     Comprehensive Metabolic Panel  -     CBC & Differential  -     TSH  -     Lipid Panel  -     T4, Free    2. Type 2 diabetes mellitus without complication, without long-term current use of insulin ([facility])  Comments:  sugars well controlled  Orders:  -     Comprehensive Metabolic Panel  -     CBC & Differential  -     TSH  -     Lipid Panel  -     T4, Free    3. Acquired hypothyroidism  Comments:  will check labs and adjust as needed  Orders:  -     Comprehensive Metabolic Panel  -     CBC & Differential  -     TSH  -     Lipid Panel  -     T4, Free    4. Essential hypertension  Comments:  stop laxis; start amlodipine; cont coreg and losartan    5. Anemia due to stage 4 chronic kidney disease ([facility])  Comments:  will monitor anemia for now; no bleeding currently; likely related to kidneys but will monitor    Other orders  -     insulin glargine (Lantus) 100 UNIT/ML injection; Inject 30 Units under the skin into the appropriate area as directed Daily for 60  days.  Dispense: 9 mL; Refill: 1  -     amLODIPine (NORVASC) 5 MG tablet; Take 1 tablet by mouth Daily.  Dispense: 90 tablet; Refill: 1                  Follow Up   Return in about 4 months (around 10/15/2022).  Patient was given instructions and counseling regarding his condition or for health maintenance advice. Please see specific information pulled into the AVS if appropriate.

## 2022-06-16 ENCOUNTER — TELEPHONE (OUTPATIENT)
Dept: INTERNAL MEDICINE | Facility: CLINIC | Age: 77
End: 2022-06-16

## 2022-06-16 ENCOUNTER — LAB (OUTPATIENT)
Dept: LAB | Facility: HOSPITAL | Age: 77
End: 2022-06-16

## 2022-06-16 DIAGNOSIS — E87.5 HYPERKALEMIA: ICD-10-CM

## 2022-06-16 DIAGNOSIS — E11.9 TYPE 2 DIABETES MELLITUS WITHOUT COMPLICATION, WITHOUT LONG-TERM CURRENT USE OF INSULIN: Primary | ICD-10-CM

## 2022-06-16 DIAGNOSIS — R31.9 HEMATURIA, UNSPECIFIED TYPE: ICD-10-CM

## 2022-06-16 DIAGNOSIS — R31.9 HEMATURIA, UNSPECIFIED TYPE: Primary | ICD-10-CM

## 2022-06-16 DIAGNOSIS — E87.5 HYPERKALEMIA: Primary | ICD-10-CM

## 2022-06-16 LAB
ANION GAP SERPL CALCULATED.3IONS-SCNC: 9.7 MMOL/L (ref 5–15)
BUN SERPL-MCNC: 51 MG/DL (ref 8–23)
BUN/CREAT SERPL: 17.1 (ref 7–25)
CALCIUM SPEC-SCNC: 8.6 MG/DL (ref 8.6–10.5)
CHLORIDE SERPL-SCNC: 110 MMOL/L (ref 98–107)
CO2 SERPL-SCNC: 18.3 MMOL/L (ref 22–29)
CREAT SERPL-MCNC: 2.99 MG/DL (ref 0.76–1.27)
EGFRCR SERPLBLD CKD-EPI 2021: 21 ML/MIN/1.73
GLUCOSE SERPL-MCNC: 79 MG/DL (ref 65–99)
POTASSIUM SERPL-SCNC: 5.7 MMOL/L (ref 3.5–5.2)
SODIUM SERPL-SCNC: 138 MMOL/L (ref 136–145)

## 2022-06-16 PROCEDURE — 80048 BASIC METABOLIC PNL TOTAL CA: CPT

## 2022-06-16 PROCEDURE — 36415 COLL VENOUS BLD VENIPUNCTURE: CPT

## 2022-06-16 PROCEDURE — 81001 URINALYSIS AUTO W/SCOPE: CPT

## 2022-06-16 NOTE — TELEPHONE ENCOUNTER
ED WITH [facility] CALLED TO LET US KNOW THAT PATIENT TOLD THEM THAT IT WAS SUPPOSED TO BE THE PEN AND NOT THE VIALS.     insulin glargine (Lantus) 100 UNIT/ML injection     Please advise permission to change.

## 2022-06-16 NOTE — PROGRESS NOTES
Called patient, he declined any chest pain or palpitations and he agreed to go get redrawn today.   I educated patient that if he were to have any chest pain or palpitations they would need to go to the ER.   Patient verbalized Understanding, had no follow up questions or concerns at this time.

## 2022-06-16 NOTE — PROGRESS NOTES
Received call from lab regarding critical lab result. Patient potassium 6.2.   Will have redrawn ASAP.  If he has chest pain or palpitations he should go to the ER for a redraw.

## 2022-06-16 NOTE — TELEPHONE ENCOUNTER
Caller: Nelson Wang    Relationship to patient: Self    Best call back number: [phone]    Patient is needing: PATIENT CALLED STATING HE WAS GOING TO GET LABS DONE TODAY IN ABOUT AN HOUR. THE PATIENT STATED HE HAS BLOOD IN HIS URINE AND WOULD LIKE TO KNOW IF HIS PCP CAN ORDER A URINE SAMPLE SO HE CAN GET THAT DONE AS WELL. THE PATIENT WOULD LIKE A CALL BACK TO LET HIM KNOW THIS HAS BEEN ORDERED PLEASE ADVISE THANK YOU.         HUB STAFF UNABLE TO WARM TRANSFER

## 2022-06-17 LAB
BACTERIA UR QL AUTO: NORMAL /HPF
BILIRUB UR QL STRIP: NEGATIVE
CLARITY UR: CLEAR
COLOR UR: YELLOW
GLUCOSE UR STRIP-MCNC: NEGATIVE MG/DL
HGB UR QL STRIP.AUTO: NEGATIVE
HYALINE CASTS UR QL AUTO: NORMAL /LPF
KETONES UR QL STRIP: NEGATIVE
LEUKOCYTE ESTERASE UR QL STRIP.AUTO: NEGATIVE
NITRITE UR QL STRIP: NEGATIVE
PH UR STRIP.AUTO: 6 [PH] (ref 5–8)
PROT UR QL STRIP: ABNORMAL
RBC # UR STRIP: NORMAL /HPF
REF LAB TEST METHOD: NORMAL
SP GR UR STRIP: 1.01 (ref 1–1.03)
SQUAMOUS #/AREA URNS HPF: NORMAL /HPF
UROBILINOGEN UR QL STRIP: ABNORMAL
WBC # UR STRIP: NORMAL /HPF

## 2022-07-10 ENCOUNTER — APPOINTMENT (OUTPATIENT)
Dept: GENERAL RADIOLOGY | Facility: HOSPITAL | Age: 77
End: 2022-07-10

## 2022-07-10 ENCOUNTER — HOSPITAL ENCOUNTER (EMERGENCY)
Facility: HOSPITAL | Age: 77
Discharge: HOME | End: 2022-07-10
Attending: EMERGENCY MEDICINE | Admitting: EMERGENCY MEDICINE

## 2022-07-10 VITALS
HEIGHT: 66 IN | RESPIRATION RATE: 14 BRPM | HEART RATE: 61 BPM | TEMPERATURE: 98.5 F | DIASTOLIC BLOOD PRESSURE: 51 MMHG | BODY MASS INDEX: 27.85 KG/M2 | OXYGEN SATURATION: 93 % | WEIGHT: 173.28 LBS | SYSTOLIC BLOOD PRESSURE: 113 MMHG

## 2022-07-10 DIAGNOSIS — N18.9 ACUTE RENAL FAILURE SUPERIMPOSED ON CHRONIC KIDNEY DISEASE, UNSPECIFIED CKD STAGE, UNSPECIFIED ACUTE RENAL FAILURE TYPE: Primary | ICD-10-CM

## 2022-07-10 DIAGNOSIS — N17.9 ACUTE RENAL FAILURE SUPERIMPOSED ON CHRONIC KIDNEY DISEASE, UNSPECIFIED CKD STAGE, UNSPECIFIED ACUTE RENAL FAILURE TYPE: Primary | ICD-10-CM

## 2022-07-10 LAB
ALBUMIN SERPL-MCNC: 3.8 G/DL (ref 3.5–5.2)
ALBUMIN/GLOB SERPL: 1.3 G/DL
ALP SERPL-CCNC: 87 U/L (ref 39–117)
ALT SERPL W P-5'-P-CCNC: 27 U/L (ref 1–41)
ANION GAP SERPL CALCULATED.3IONS-SCNC: 11.9 MMOL/L (ref 5–15)
AST SERPL-CCNC: 28 U/L (ref 1–40)
BASOPHILS # BLD AUTO: 0.03 10*3/MM3 (ref 0–0.2)
BASOPHILS NFR BLD AUTO: 0.6 % (ref 0–1.5)
BILIRUB SERPL-MCNC: 0.4 MG/DL (ref 0–1.2)
BUN SERPL-MCNC: 68 MG/DL (ref 8–23)
BUN/CREAT SERPL: 16.9 (ref 7–25)
CALCIUM SPEC-SCNC: 9.2 MG/DL (ref 8.6–10.5)
CHLORIDE SERPL-SCNC: 113 MMOL/L (ref 98–107)
CO2 SERPL-SCNC: 19.1 MMOL/L (ref 22–29)
CREAT SERPL-MCNC: 4.03 MG/DL (ref 0.76–1.27)
DEPRECATED RDW RBC AUTO: 53.5 FL (ref 37–54)
EGFRCR SERPLBLD CKD-EPI 2021: 14.6 ML/MIN/1.73
EOSINOPHIL # BLD AUTO: 0.17 10*3/MM3 (ref 0–0.4)
EOSINOPHIL NFR BLD AUTO: 3.2 % (ref 0.3–6.2)
ERYTHROCYTE [DISTWIDTH] IN BLOOD BY AUTOMATED COUNT: 15.6 % (ref 12.3–15.4)
GLOBULIN UR ELPH-MCNC: 3 GM/DL
GLUCOSE SERPL-MCNC: 88 MG/DL (ref 65–99)
HCT VFR BLD AUTO: 26.7 % (ref 37.5–51)
HGB BLD-MCNC: 8.2 G/DL (ref 13–17.7)
HOLD SPECIMEN: NORMAL
HOLD SPECIMEN: NORMAL
IMM GRANULOCYTES # BLD AUTO: 0.03 10*3/MM3 (ref 0–0.05)
IMM GRANULOCYTES NFR BLD AUTO: 0.6 % (ref 0–0.5)
LYMPHOCYTES # BLD AUTO: 1.2 10*3/MM3 (ref 0.7–3.1)
LYMPHOCYTES NFR BLD AUTO: 22.9 % (ref 19.6–45.3)
MCH RBC QN AUTO: 28.9 PG (ref 26.6–33)
MCHC RBC AUTO-ENTMCNC: 30.7 G/DL (ref 31.5–35.7)
MCV RBC AUTO: 94 FL (ref 79–97)
MONOCYTES # BLD AUTO: 0.48 10*3/MM3 (ref 0.1–0.9)
MONOCYTES NFR BLD AUTO: 9.2 % (ref 5–12)
NEUTROPHILS NFR BLD AUTO: 3.33 10*3/MM3 (ref 1.7–7)
NEUTROPHILS NFR BLD AUTO: 63.5 % (ref 42.7–76)
NRBC BLD AUTO-RTO: 0 /100 WBC (ref 0–0.2)
NT-PROBNP SERPL-MCNC: 1470 PG/ML (ref 0–1800)
PLATELET # BLD AUTO: 125 10*3/MM3 (ref 140–450)
PMV BLD AUTO: 11.2 FL (ref 6–12)
POTASSIUM SERPL-SCNC: 4.9 MMOL/L (ref 3.5–5.2)
PROT SERPL-MCNC: 6.8 G/DL (ref 6–8.5)
QT INTERVAL: 418 MS
RBC # BLD AUTO: 2.84 10*6/MM3 (ref 4.14–5.8)
SODIUM SERPL-SCNC: 144 MMOL/L (ref 136–145)
TROPONIN T SERPL-MCNC: <0.01 NG/ML (ref 0–0.03)
WBC NRBC COR # BLD: 5.24 10*3/MM3 (ref 3.4–10.8)
WHOLE BLOOD HOLD COAG: NORMAL
WHOLE BLOOD HOLD SPECIMEN: NORMAL

## 2022-07-10 PROCEDURE — 96374 THER/PROPH/DIAG INJ IV PUSH: CPT

## 2022-07-10 PROCEDURE — 84484 ASSAY OF TROPONIN QUANT: CPT | Performed by: EMERGENCY MEDICINE

## 2022-07-10 PROCEDURE — 71045 X-RAY EXAM CHEST 1 VIEW: CPT

## 2022-07-10 PROCEDURE — 93005 ELECTROCARDIOGRAM TRACING: CPT | Performed by: EMERGENCY MEDICINE

## 2022-07-10 PROCEDURE — 83880 ASSAY OF NATRIURETIC PEPTIDE: CPT | Performed by: EMERGENCY MEDICINE

## 2022-07-10 PROCEDURE — 80053 COMPREHEN METABOLIC PANEL: CPT | Performed by: EMERGENCY MEDICINE

## 2022-07-10 PROCEDURE — 99283 EMERGENCY DEPT VISIT LOW MDM: CPT

## 2022-07-10 PROCEDURE — 85025 COMPLETE CBC W/AUTO DIFF WBC: CPT | Performed by: EMERGENCY MEDICINE

## 2022-07-10 PROCEDURE — 36415 COLL VENOUS BLD VENIPUNCTURE: CPT

## 2022-07-10 PROCEDURE — 93010 ELECTROCARDIOGRAM REPORT: CPT | Performed by: INTERNAL MEDICINE

## 2022-07-10 PROCEDURE — 25010000002 FUROSEMIDE PER 20 MG: Performed by: EMERGENCY MEDICINE

## 2022-07-10 RX ORDER — FUROSEMIDE 10 MG/ML
80 INJECTION INTRAMUSCULAR; INTRAVENOUS ONCE
Status: COMPLETED | OUTPATIENT
Start: 2022-07-10 | End: 2022-07-10

## 2022-07-10 RX ORDER — SODIUM CHLORIDE 0.9 % (FLUSH) 0.9 %
10 SYRINGE (ML) INJECTION AS NEEDED
Status: DISCONTINUED | OUTPATIENT
Start: 2022-07-10 | End: 2022-07-10 | Stop reason: HOSPADM

## 2022-07-10 RX ADMIN — FUROSEMIDE 80 MG: 10 INJECTION, SOLUTION INTRAMUSCULAR; INTRAVENOUS at 05:04

## 2022-07-10 NOTE — DISCHARGE INSTRUCTIONS
Please increase your Lasix dose to 40 mg twice a day.    Please take your sodium bicarb as prescribed, 2 tablets 3 times a day.

## 2022-07-10 NOTE — ED TRIAGE NOTES
Pt to ED from home with reports of shortness of breath x couple days.      Pt states he was unable to lie flat tonight r/t SOA.  BL lower extremity edema noted currently.    Pt is scheduled to be evaluated at [facility] clinic for kidney function.

## 2022-07-10 NOTE — ED PROVIDER NOTES
Time: 3:10 AM EDT    Chief Complaint: shortness of breath    History of Present Illness:  Patient is a 77 y.o. year old male, with history of psoriasis and vitiligo, that presents to the emergency department with shortness of breath x 2 days ago. Associated with abdominal distension and pressure and 8 pound weight gain within the past week. Patient reports his symptom is worsening and is exacerbated with exertion. He states he is unable to lie flat due to the shortness of breath. Patient is scheduled to be evaluated at [facility] on Monday for a second opinion regarding his CKD. His nephrologist is Dr. Castro. Patient reports he is on 40 mg of Lasix once a day. Patient is not on home oxygen. He also notes that even though he has HTN, his BP has been low. Denies history of emphysema, COPD, or asthma.     HPI    Similar Symptoms Previously: no  Recently seen: no      Patient Care Team  Primary Care Provider: Janna Mo MD    Past Medical History:     No Known Allergies  Past Medical History:   Diagnosis Date   • CKD (chronic kidney disease)    • Diabetes (HCC)    • Gout    • High cholesterol    • HL (hearing loss)    • Hypertension    • Hypothyroidism    • Psoriasis    • Vitiligo      Past Surgical History:   Procedure Laterality Date   • ANKLE SURGERY  1990   • ANKLE SURGERY     • APPENDECTOMY     • HIP BIPOLAR REPLACEMENT      Right     Family History   Problem Relation Age of Onset   • Cancer Sister 35   • Diabetes Sister    • Diabetes Mother    • Heart disease Father    • Diabetes Brother    • Heart disease Paternal Uncle        Home Medications:  Prior to Admission medications    Medication Sig Start Date End Date Taking? Authorizing Provider   sodium bicarbonate 650 MG tablet TAKE 2 TABLETS BY MOUTH IN THE MORNING AND 2 TABLETS IN THE EVENING AND 2 TABLET AT BEDTIME 6/10/22  Yes Provider, MD Serena   allopurinol (ZYLOPRIM) 100 MG tablet Take 200 mg by mouth 2 (Two) Times a Day.    Provider,  MD Serena   amLODIPine (NORVASC) 5 MG tablet Take 1 tablet by mouth Daily. 6/15/22   Janna Mo MD   ascorbic acid (VITAMIN C) 1000 MG tablet Take 1,000 mg by mouth.    Serena Matute MD   atorvastatin (Lipitor) 40 MG tablet Take 1 tablet by mouth Daily. 6/22/21   Cynthia Bustillo, APRN   Calcium Carbonate-Vitamin D (calcium-vitamin D) 500-200 MG-UNIT tablet per tablet Take 1 tablet by mouth.    Serena Matute MD   carvedilol (COREG) 25 MG tablet Take 25 mg by mouth 2 (Two) Times a Day With Meals. 6/9/22   Serena Matute MD   Continuous Blood Gluc  (Dexcom G6 ) device 1 kit Daily. 3/18/22   Janna Mo MD   Continuous Blood Gluc Sensor (Dexcom G6 Sensor) Every 10 (Ten) Days. 3/18/22   Janna Mo MD   Continuous Blood Gluc Transmit (Dexcom G6 Transmitter) misc 1 kit Daily. 3/18/22   Janna Mo MD   Euthyrox 75 MCG tablet Take 1 tablet by mouth once daily 4/8/22   Beba Grant MD   Insulin Glargine (LANTUS SOLOSTAR) 100 UNIT/ML injection pen Inject 30 Units under the skin into the appropriate area as directed Daily. 6/17/22   Janna Mo MD   Omega-3 Fatty Acids (fish oil) 1000 MG capsule capsule Take  by mouth.    Serena Matute MD   Risankizumab-rzaa (SKYRIZI, 150 MG DOSE, SC) Inject  under the skin into the appropriate area as directed. Once every 3 months    Serena Matute MD   sertraline (ZOLOFT) 100 MG tablet Take 100 mg by mouth Daily. 6/11/22   Serena Matute MD   Turmeric 500 MG capsule Take  by mouth.    Serena Matute MD   losartan (COZAAR) 25 MG tablet Take 1 tablet by mouth Daily. 3/30/22 7/10/22  Beba Grant MD        Social History:   Social History     Tobacco Use   • Smoking status: Former Smoker     Packs/day: 1.00     Types: Cigarettes   • Smokeless tobacco: Never Used   • Tobacco comment: quit 25 years ago, chews nicotine gum   Vaping Use   • Vaping Use: Never used   Substance  "Use Topics   • Alcohol use: Yes     Comment: 1 DRINK/DAY   • Drug use: Never       Record Review:  I have reviewed the patient's records in [facility].     Review of Systems:  Review of Systems   Constitutional: Positive for unexpected weight change (weight gain). Negative for chills and fever.   HENT: Negative for congestion, rhinorrhea and sore throat.    Eyes: Negative for pain and visual disturbance.   Respiratory: Positive for shortness of breath. Negative for apnea, cough and chest tightness.    Cardiovascular: Negative for chest pain and palpitations.   Gastrointestinal: Positive for abdominal distention. Negative for abdominal pain, diarrhea, nausea and vomiting.        Positive for abdominal pressure.    Genitourinary: Negative for difficulty urinating and dysuria.   Musculoskeletal: Negative for joint swelling and myalgias.   Skin: Negative for color change.   Neurological: Negative for seizures and headaches.   Psychiatric/Behavioral: Negative.    All other systems reviewed and are negative.       Physical Exam:  /51   Pulse 61   Temp 98.5 °F (36.9 °C) (Oral)   Resp 14   Ht 167.6 cm (66\")   Wt 78.6 kg (173 lb 4.5 oz)   SpO2 93%   BMI 27.97 kg/m²     Physical Exam  Vitals and nursing note reviewed.   Constitutional:       General: He is not in acute distress.     Appearance: Normal appearance. He is not toxic-appearing.   HENT:      Head: Normocephalic and atraumatic.      Jaw: There is normal jaw occlusion.   Eyes:      General: Lids are normal.      Extraocular Movements: Extraocular movements intact.      Conjunctiva/sclera: Conjunctivae normal.      Pupils: Pupils are equal, round, and reactive to light.   Cardiovascular:      Rate and Rhythm: Normal rate and regular rhythm.      Pulses: Normal pulses.      Heart sounds: Normal heart sounds.   Pulmonary:      Effort: Pulmonary effort is normal. Tachypnea present. No respiratory distress.      Breath sounds: Rhonchi (bilateral bases) present. No " wheezing.   Abdominal:      General: Abdomen is flat. There is no distension.      Palpations: Abdomen is soft.      Tenderness: There is no abdominal tenderness. There is no guarding or rebound.   Musculoskeletal:         General: Normal range of motion.      Cervical back: Normal range of motion and neck supple.      Right lower leg: Edema (mild) present.      Left lower leg: Edema (mild) present.   Skin:     General: Skin is warm and dry.   Neurological:      Mental Status: He is alert and oriented to person, place, and time. Mental status is at baseline.   Psychiatric:         Mood and Affect: Mood normal.                    Medications in the Emergency Department:  Medications   sodium chloride 0.9 % flush 10 mL (has no administration in time range)   furosemide (LASIX) injection 80 mg (80 mg Intravenous Given 7/10/22 0504)        Labs  Lab Results (last 24 hours)     Procedure Component Value Units Date/Time    CBC & Differential [[id number]]  (Abnormal) Collected: 07/10/22 0256    Specimen: Blood Updated: 07/10/22 0327    Narrative:      The following orders were created for panel order CBC & Differential.  Procedure                               Abnormality         Status                     ---------                               -----------         ------                     CBC Auto Differential[[id number]]        Abnormal            Final result                 Please view results for these tests on the individual orders.    Comprehensive Metabolic Panel [[id number]]  (Abnormal) Collected: 07/10/22 0256    Specimen: Blood Updated: 07/10/22 0347     Glucose 88 mg/dL      BUN 68 mg/dL      Creatinine 4.03 mg/dL      Sodium 144 mmol/L      Potassium 4.9 mmol/L      Chloride 113 mmol/L      CO2 19.1 mmol/L      Calcium 9.2 mg/dL      Total Protein 6.8 g/dL      Albumin 3.80 g/dL      ALT (SGPT) 27 U/L      AST (SGOT) 28 U/L      Alkaline Phosphatase 87 U/L      Total Bilirubin 0.4 mg/dL      Globulin 3.0 gm/dL       A/G Ratio 1.3 g/dL      BUN/Creatinine Ratio 16.9     Anion Gap 11.9 mmol/L      eGFR 14.6 mL/min/1.73      Comment: <15 Indicative of kidney failure       Narrative:      GFR Normal >60  Chronic Kidney Disease <60  Kidney Failure <15      BNP [[id number]]  (Normal) Collected: 07/10/22 0256    Specimen: Blood Updated: 07/10/22 0342     proBNP 1,470.0 pg/mL     Narrative:      Among patients with dyspnea, NT-proBNP is highly sensitive for the detection of acute congestive heart failure. In addition NT-proBNP of <300 pg/ml effectively rules out acute congestive heart failure with 99% negative predictive value.    Results may be falsely decreased if patient taking Biotin.      Troponin [[id number]]  (Normal) Collected: 07/10/22 0256    Specimen: Blood Updated: 07/10/22 0347     Troponin T <0.010 ng/mL     Narrative:      Troponin T Reference Range:  <= 0.03 ng/mL-   Negative for AMI  >0.03 ng/mL-     Abnormal for myocardial necrosis.  Clinicians would have to utilize clinical acumen, EKG, Troponin and serial changes to determine if it is an Acute Myocardial Infarction or myocardial injury due to an underlying chronic condition.       Results may be falsely decreased if patient taking Biotin.      CBC Auto Differential [[id number]]  (Abnormal) Collected: 07/10/22 0256    Specimen: Blood Updated: 07/10/22 0327     WBC 5.24 10*3/mm3      RBC 2.84 10*6/mm3      Hemoglobin 8.2 g/dL      Hematocrit 26.7 %      MCV 94.0 fL      MCH 28.9 pg      MCHC 30.7 g/dL      RDW 15.6 %      RDW-SD 53.5 fl      MPV 11.2 fL      Platelets 125 10*3/mm3      Neutrophil % 63.5 %      Lymphocyte % 22.9 %      Monocyte % 9.2 %      Eosinophil % 3.2 %      Basophil % 0.6 %      Immature Grans % 0.6 %      Neutrophils, Absolute 3.33 10*3/mm3      Lymphocytes, Absolute 1.20 10*3/mm3      Monocytes, Absolute 0.48 10*3/mm3      Eosinophils, Absolute 0.17 10*3/mm3      Basophils, Absolute 0.03 10*3/mm3      Immature Grans, Absolute 0.03 10*3/mm3       nRBC 0.0 /100 WBC            Imaging:  XR Chest 1 View    Result Date: 7/10/2022  PROCEDURE: XR CHEST 1 VW  COMPARISON: None.  INDICATIONS: SHORTNESS OF BREATH.  FINDINGS: A single frontal (AP upright portable) chest radiograph reveals no cardiac enlargement. No pneumothorax is seen.  There may be subsegmental atelectasis involving the lungs bilaterally.  Acute infiltrate is thought to be less likely.  There may be mild fibrotic changes within the lungs bilaterally.  There is suspected chronic asymmetric elevation of the right diaphragm.  External artifacts obscure detail.  The thoracic aorta is atherosclerotic.  Chronic calcified granulomatous disease involves the chest.  There is pulmonary hypoinflation.  Degenerative changes involve the imaged spine.  There may be mild dextroscoliosis of the mid to lower thoracic spine.       No acute infiltrate is suspected radiographically.      COMMENT:  Part of this note is an electronic transcription of spoken language to printed text. The electronic translation/transcription may permit erroneous, or at times, nonsensical (or even sensical) words or phrases to be inadvertently transcribed or omitted; this  has reviewed the note for such errors (as well as additional errors); however, some may still exist.  АНДРЕЙ DE LUNA JR, MD       Electronically Signed and Approved By: АНДРЕЙ DE LUNA JR, MD on 7/10/2022 at 3:40                Procedures:  ECG 12 Lead      Date/Time: 7/10/2022 3:31 AM  Performed by: Lorne Sánchez MD  Authorized by: Lorne Sánchez MD   Previous ECG: no previous ECG available  Rhythm: sinus rhythm  BPM: 63  Comments: Flattened P wave. Normal QRS. Normal ST segment and QT. Artifact present.          Progress                            Medical Decision Making:  MDM  Number of Diagnoses or Management Options     Amount and/or Complexity of Data Reviewed  Decide to obtain previous medical records or to obtain history from someone other than the  patient: yes  Discuss the patient with other providers: yes    Risk of Complications, Morbidity, and/or Mortality  General comments: 03:57 EDT: Patient rechecked. He is feeling better on oxygen. Discussed with patient that nephrology will be consulted regarding his kidney functions.   04:46 EDT: Discussed patient's case with Dr. Louis, nephrology, who recommended IV Lasix and Bicarb and increasing the patient's home Lasix.    Patient had good effect to IV furosemide in the emergency department.  We discussed adjusting the patient's home medications per nephrology's recommendations.  Patient and family are comfortable with plan for discharge home so that the patient may make his trip to be evaluated by his [facility] nephrology team.  We discussed return precautions including worsening symptoms or any additional concerns.    Final diagnoses:   Acute renal failure superimposed on chronic kidney disease, unspecified CKD stage, unspecified acute renal failure type (HCC)        Disposition:  ED Disposition     ED Disposition   Discharge    Condition   Stable    Comment   --             Dictated Utilizing Dragon Dictation    Documentation assistance provided by Susan Pace acting as scribe for Lorne Sánchez MD. Information recorded by the scribe was done at my direction and has been verified and validated by me.        Susan Pace  07/10/22 0323       Susan Pace  07/10/22 0332       Susan Pace  07/10/22 0428       Susan Pace  07/10/22 0456       Lorne Sánchez MD  07/10/22 6657

## 2022-07-20 ENCOUNTER — TRANSCRIBE ORDERS (OUTPATIENT)
Dept: LAB | Facility: HOSPITAL | Age: 77
End: 2022-07-20

## 2022-07-20 ENCOUNTER — LAB (OUTPATIENT)
Dept: LAB | Facility: HOSPITAL | Age: 77
End: 2022-07-20

## 2022-07-20 DIAGNOSIS — E55.9 VITAMIN D DEFICIENCY: ICD-10-CM

## 2022-07-20 DIAGNOSIS — N18.4 CHRONIC KIDNEY DISEASE, STAGE IV (SEVERE): Primary | ICD-10-CM

## 2022-07-20 DIAGNOSIS — N18.4 CHRONIC KIDNEY DISEASE, STAGE IV (SEVERE): ICD-10-CM

## 2022-07-20 LAB
25(OH)D3 SERPL-MCNC: 51.3 NG/ML (ref 30–100)
ALBUMIN SERPL-MCNC: 4.1 G/DL (ref 3.5–5.2)
ANION GAP SERPL CALCULATED.3IONS-SCNC: 10.4 MMOL/L (ref 5–15)
BACTERIA UR QL AUTO: NORMAL /HPF
BASOPHILS # BLD AUTO: 0.03 10*3/MM3 (ref 0–0.2)
BASOPHILS NFR BLD AUTO: 0.5 % (ref 0–1.5)
BILIRUB UR QL STRIP: NEGATIVE
BUN SERPL-MCNC: 80 MG/DL (ref 8–23)
BUN/CREAT SERPL: 19.8 (ref 7–25)
CALCIUM SPEC-SCNC: 9.9 MG/DL (ref 8.6–10.5)
CHLORIDE SERPL-SCNC: 102 MMOL/L (ref 98–107)
CLARITY UR: CLEAR
CO2 SERPL-SCNC: 28.6 MMOL/L (ref 22–29)
COLOR UR: YELLOW
CREAT SERPL-MCNC: 4.05 MG/DL (ref 0.76–1.27)
CREAT UR-MCNC: 36.5 MG/DL
DEPRECATED RDW RBC AUTO: 50.4 FL (ref 37–54)
EGFRCR SERPLBLD CKD-EPI 2021: 14.6 ML/MIN/1.73
EOSINOPHIL # BLD AUTO: 0.21 10*3/MM3 (ref 0–0.4)
EOSINOPHIL NFR BLD AUTO: 3.4 % (ref 0.3–6.2)
ERYTHROCYTE [DISTWIDTH] IN BLOOD BY AUTOMATED COUNT: 15.2 % (ref 12.3–15.4)
GLUCOSE SERPL-MCNC: 157 MG/DL (ref 65–99)
GLUCOSE UR STRIP-MCNC: NEGATIVE MG/DL
HCT VFR BLD AUTO: 30.2 % (ref 37.5–51)
HGB BLD-MCNC: 9.3 G/DL (ref 13–17.7)
HGB UR QL STRIP.AUTO: NEGATIVE
HYALINE CASTS UR QL AUTO: NORMAL /LPF
IMM GRANULOCYTES # BLD AUTO: 0.01 10*3/MM3 (ref 0–0.05)
IMM GRANULOCYTES NFR BLD AUTO: 0.2 % (ref 0–0.5)
KETONES UR QL STRIP: NEGATIVE
LEUKOCYTE ESTERASE UR QL STRIP.AUTO: NEGATIVE
LYMPHOCYTES # BLD AUTO: 1.48 10*3/MM3 (ref 0.7–3.1)
LYMPHOCYTES NFR BLD AUTO: 24 % (ref 19.6–45.3)
MCH RBC QN AUTO: 28.6 PG (ref 26.6–33)
MCHC RBC AUTO-ENTMCNC: 30.8 G/DL (ref 31.5–35.7)
MCV RBC AUTO: 92.9 FL (ref 79–97)
MONOCYTES # BLD AUTO: 0.68 10*3/MM3 (ref 0.1–0.9)
MONOCYTES NFR BLD AUTO: 11 % (ref 5–12)
NEUTROPHILS NFR BLD AUTO: 3.76 10*3/MM3 (ref 1.7–7)
NEUTROPHILS NFR BLD AUTO: 60.9 % (ref 42.7–76)
NITRITE UR QL STRIP: NEGATIVE
NRBC BLD AUTO-RTO: 0 /100 WBC (ref 0–0.2)
PH UR STRIP.AUTO: 6.5 [PH] (ref 5–8)
PHOSPHATE SERPL-MCNC: 4 MG/DL (ref 2.5–4.5)
PLATELET # BLD AUTO: 154 10*3/MM3 (ref 140–450)
PMV BLD AUTO: 12.1 FL (ref 6–12)
POTASSIUM SERPL-SCNC: 3.9 MMOL/L (ref 3.5–5.2)
PROT ?TM UR-MCNC: 134.9 MG/DL
PROT UR QL STRIP: ABNORMAL
PROT/CREAT UR: 3.7 MG/G{CREAT}
PTH-INTACT SERPL-MCNC: 128 PG/ML (ref 15–65)
RBC # BLD AUTO: 3.25 10*6/MM3 (ref 4.14–5.8)
RBC # UR STRIP: NORMAL /HPF
REF LAB TEST METHOD: NORMAL
SODIUM SERPL-SCNC: 141 MMOL/L (ref 136–145)
SP GR UR STRIP: 1.01 (ref 1–1.03)
SQUAMOUS #/AREA URNS HPF: NORMAL /HPF
UROBILINOGEN UR QL STRIP: ABNORMAL
WBC # UR STRIP: NORMAL /HPF
WBC NRBC COR # BLD: 6.17 10*3/MM3 (ref 3.4–10.8)

## 2022-07-20 PROCEDURE — 82306 VITAMIN D 25 HYDROXY: CPT

## 2022-07-20 PROCEDURE — 83970 ASSAY OF PARATHORMONE: CPT

## 2022-07-20 PROCEDURE — 36415 COLL VENOUS BLD VENIPUNCTURE: CPT

## 2022-07-20 PROCEDURE — 80069 RENAL FUNCTION PANEL: CPT

## 2022-07-20 PROCEDURE — 81001 URINALYSIS AUTO W/SCOPE: CPT

## 2022-07-20 PROCEDURE — 82570 ASSAY OF URINE CREATININE: CPT

## 2022-07-20 PROCEDURE — 85025 COMPLETE CBC W/AUTO DIFF WBC: CPT

## 2022-07-20 PROCEDURE — 84156 ASSAY OF PROTEIN URINE: CPT

## 2022-07-21 ENCOUNTER — TRANSCRIBE ORDERS (OUTPATIENT)
Dept: ADMINISTRATIVE | Facility: HOSPITAL | Age: 77
End: 2022-07-21

## 2022-07-21 DIAGNOSIS — N18.4 CKD (CHRONIC KIDNEY DISEASE) STAGE 4, GFR 15-29 ML/MIN: Primary | ICD-10-CM

## 2022-07-25 ENCOUNTER — LAB (OUTPATIENT)
Dept: LAB | Facility: HOSPITAL | Age: 77
End: 2022-07-25

## 2022-07-25 ENCOUNTER — TRANSCRIBE ORDERS (OUTPATIENT)
Dept: LAB | Facility: HOSPITAL | Age: 77
End: 2022-07-25

## 2022-07-25 DIAGNOSIS — N18.4 CHRONIC KIDNEY DISEASE, STAGE IV (SEVERE): ICD-10-CM

## 2022-07-25 DIAGNOSIS — N18.4 CHRONIC KIDNEY DISEASE, STAGE IV (SEVERE): Primary | ICD-10-CM

## 2022-07-25 LAB
ALBUMIN SERPL-MCNC: 4 G/DL (ref 3.5–5.2)
ANION GAP SERPL CALCULATED.3IONS-SCNC: 12.6 MMOL/L (ref 5–15)
APTT PPP: 29.3 SECONDS (ref 24.2–34.2)
BASOPHILS # BLD AUTO: 0.03 10*3/MM3 (ref 0–0.2)
BASOPHILS NFR BLD AUTO: 0.4 % (ref 0–1.5)
BUN SERPL-MCNC: 72 MG/DL (ref 8–23)
BUN/CREAT SERPL: 16 (ref 7–25)
CALCIUM SPEC-SCNC: 9.9 MG/DL (ref 8.6–10.5)
CHLORIDE SERPL-SCNC: 102 MMOL/L (ref 98–107)
CO2 SERPL-SCNC: 26.4 MMOL/L (ref 22–29)
CREAT SERPL-MCNC: 4.51 MG/DL (ref 0.76–1.27)
DEPRECATED RDW RBC AUTO: 46.2 FL (ref 37–54)
EGFRCR SERPLBLD CKD-EPI 2021: 12.8 ML/MIN/1.73
EOSINOPHIL # BLD AUTO: 0.27 10*3/MM3 (ref 0–0.4)
EOSINOPHIL NFR BLD AUTO: 4 % (ref 0.3–6.2)
ERYTHROCYTE [DISTWIDTH] IN BLOOD BY AUTOMATED COUNT: 14.4 % (ref 12.3–15.4)
FERRITIN SERPL-MCNC: 687 NG/ML (ref 30–400)
GLUCOSE SERPL-MCNC: 161 MG/DL (ref 65–99)
HCT VFR BLD AUTO: 29 % (ref 37.5–51)
HGB BLD-MCNC: 9.3 G/DL (ref 13–17.7)
IMM GRANULOCYTES # BLD AUTO: 0.02 10*3/MM3 (ref 0–0.05)
IMM GRANULOCYTES NFR BLD AUTO: 0.3 % (ref 0–0.5)
INR PPP: 1.06 (ref 0.86–1.15)
IRON 24H UR-MRATE: 78 MCG/DL (ref 59–158)
IRON SATN MFR SERPL: 22 % (ref 20–50)
LYMPHOCYTES # BLD AUTO: 2.16 10*3/MM3 (ref 0.7–3.1)
LYMPHOCYTES NFR BLD AUTO: 32 % (ref 19.6–45.3)
MCH RBC QN AUTO: 28.7 PG (ref 26.6–33)
MCHC RBC AUTO-ENTMCNC: 32.1 G/DL (ref 31.5–35.7)
MCV RBC AUTO: 89.5 FL (ref 79–97)
MONOCYTES # BLD AUTO: 0.57 10*3/MM3 (ref 0.1–0.9)
MONOCYTES NFR BLD AUTO: 8.4 % (ref 5–12)
NEUTROPHILS NFR BLD AUTO: 3.71 10*3/MM3 (ref 1.7–7)
NEUTROPHILS NFR BLD AUTO: 54.9 % (ref 42.7–76)
NRBC BLD AUTO-RTO: 0 /100 WBC (ref 0–0.2)
PHOSPHATE SERPL-MCNC: 3.8 MG/DL (ref 2.5–4.5)
PLATELET # BLD AUTO: 164 10*3/MM3 (ref 140–450)
PMV BLD AUTO: 11.9 FL (ref 6–12)
POTASSIUM SERPL-SCNC: 4.5 MMOL/L (ref 3.5–5.2)
PROTHROMBIN TIME: 13.9 SECONDS (ref 11.8–14.9)
RBC # BLD AUTO: 3.24 10*6/MM3 (ref 4.14–5.8)
SODIUM SERPL-SCNC: 141 MMOL/L (ref 136–145)
TIBC SERPL-MCNC: 352 MCG/DL (ref 298–536)
TRANSFERRIN SERPL-MCNC: 236 MG/DL (ref 200–360)
URATE SERPL-MCNC: 5.9 MG/DL (ref 3.4–7)
WBC NRBC COR # BLD: 6.76 10*3/MM3 (ref 3.4–10.8)

## 2022-07-25 PROCEDURE — 82728 ASSAY OF FERRITIN: CPT

## 2022-07-25 PROCEDURE — 80069 RENAL FUNCTION PANEL: CPT

## 2022-07-25 PROCEDURE — 85730 THROMBOPLASTIN TIME PARTIAL: CPT

## 2022-07-25 PROCEDURE — 83540 ASSAY OF IRON: CPT

## 2022-07-25 PROCEDURE — 84550 ASSAY OF BLOOD/URIC ACID: CPT

## 2022-07-25 PROCEDURE — 85610 PROTHROMBIN TIME: CPT

## 2022-07-25 PROCEDURE — 84466 ASSAY OF TRANSFERRIN: CPT

## 2022-07-25 PROCEDURE — 85025 COMPLETE CBC W/AUTO DIFF WBC: CPT

## 2022-07-25 PROCEDURE — 36415 COLL VENOUS BLD VENIPUNCTURE: CPT

## 2022-08-01 ENCOUNTER — HOSPITAL ENCOUNTER (OUTPATIENT)
Dept: CT IMAGING | Facility: HOSPITAL | Age: 77
Discharge: HOME | End: 2022-08-01
Admitting: INTERNAL MEDICINE

## 2022-08-01 VITALS
WEIGHT: 152 LBS | HEIGHT: 66 IN | DIASTOLIC BLOOD PRESSURE: 51 MMHG | HEART RATE: 76 BPM | OXYGEN SATURATION: 92 % | SYSTOLIC BLOOD PRESSURE: 109 MMHG | BODY MASS INDEX: 24.43 KG/M2 | RESPIRATION RATE: 12 BRPM

## 2022-08-01 DIAGNOSIS — N18.4 CKD (CHRONIC KIDNEY DISEASE) STAGE 4, GFR 15-29 ML/MIN: ICD-10-CM

## 2022-08-01 PROCEDURE — 25010000002 DESMOPRESSIN ACETATE PF 4 MCG/ML SOLUTION 1 ML AMPULE: Performed by: RADIOLOGY

## 2022-08-01 PROCEDURE — 88313 SPECIAL STAINS GROUP 2: CPT

## 2022-08-01 PROCEDURE — 25010000002 ONDANSETRON PER 1 MG: Performed by: RADIOLOGY

## 2022-08-01 PROCEDURE — 88348 ELECTRON MICROSCOPY DX: CPT

## 2022-08-01 PROCEDURE — 88300 SURGICAL PATH GROSS: CPT | Performed by: INTERNAL MEDICINE

## 2022-08-01 PROCEDURE — 88342 IMHCHEM/IMCYTCHM 1ST ANTB: CPT

## 2022-08-01 PROCEDURE — 25010000002 FENTANYL CITRATE (PF) 50 MCG/ML SOLUTION: Performed by: RADIOLOGY

## 2022-08-01 PROCEDURE — 77012 CT SCAN FOR NEEDLE BIOPSY: CPT

## 2022-08-01 PROCEDURE — 88305 TISSUE EXAM BY PATHOLOGIST: CPT

## 2022-08-01 PROCEDURE — 88346 IMFLUOR 1ST 1ANTB STAIN PX: CPT

## 2022-08-01 PROCEDURE — 88350 IMFLUOR EA ADDL 1ANTB STN PX: CPT

## 2022-08-01 RX ORDER — LIDOCAINE HYDROCHLORIDE 20 MG/ML
INJECTION, SOLUTION INFILTRATION; PERINEURAL
Status: DISPENSED
Start: 2022-08-01 | End: 2022-08-01

## 2022-08-01 RX ORDER — FENTANYL CITRATE 50 UG/ML
INJECTION, SOLUTION INTRAMUSCULAR; INTRAVENOUS
Status: COMPLETED | OUTPATIENT
Start: 2022-08-01 | End: 2022-08-01

## 2022-08-01 RX ORDER — ONDANSETRON 2 MG/ML
INJECTION INTRAMUSCULAR; INTRAVENOUS
Status: COMPLETED | OUTPATIENT
Start: 2022-08-01 | End: 2022-08-01

## 2022-08-01 RX ADMIN — ONDANSETRON 4 MG: 2 INJECTION INTRAMUSCULAR; INTRAVENOUS at 09:49

## 2022-08-01 RX ADMIN — DESMOPRESSIN ACETATE 21 MCG: 4 SOLUTION INTRAVENOUS at 09:06

## 2022-08-01 RX ADMIN — FENTANYL CITRATE 50 MCG: 50 INJECTION, SOLUTION INTRAMUSCULAR; INTRAVENOUS at 09:50

## 2022-08-01 NOTE — NURSING NOTE
Pt voided x2 this shift without difficulty or bleeding reported.Ambulatory to private vehicle to wife. Dressing dry and intact. No evidence of bleeding or hematoma noted.   Discharge instructions reviewed.

## 2022-08-01 NOTE — NURSING NOTE
Spoke with wife via phone after 2 attempts to update. Wife returned call after message was left on home phone. Estimated discharge time given to wife

## 2022-08-03 LAB
LAB AP CASE REPORT: NORMAL
LAB AP CLINICAL INFORMATION: NORMAL
PATH REPORT.FINAL DX SPEC: NORMAL
PATH REPORT.GROSS SPEC: NORMAL

## 2022-08-11 ENCOUNTER — LAB (OUTPATIENT)
Dept: LAB | Facility: HOSPITAL | Age: 77
End: 2022-08-11

## 2022-08-11 ENCOUNTER — TRANSCRIBE ORDERS (OUTPATIENT)
Dept: LAB | Facility: HOSPITAL | Age: 77
End: 2022-08-11

## 2022-08-11 DIAGNOSIS — N18.4 CHRONIC KIDNEY DISEASE, STAGE IV (SEVERE): ICD-10-CM

## 2022-08-11 DIAGNOSIS — N18.5 CHRONIC KIDNEY DISEASE, STAGE V: ICD-10-CM

## 2022-08-11 DIAGNOSIS — N18.4 CHRONIC KIDNEY DISEASE, STAGE IV (SEVERE): Primary | ICD-10-CM

## 2022-08-11 LAB
ALBUMIN SERPL-MCNC: 3.8 G/DL (ref 3.5–5.2)
ANION GAP SERPL CALCULATED.3IONS-SCNC: 15.3 MMOL/L (ref 5–15)
BASOPHILS # BLD AUTO: 0.03 10*3/MM3 (ref 0–0.2)
BASOPHILS NFR BLD AUTO: 0.5 % (ref 0–1.5)
BUN SERPL-MCNC: 67 MG/DL (ref 8–23)
BUN/CREAT SERPL: 14.6 (ref 7–25)
CALCIUM SPEC-SCNC: 9.6 MG/DL (ref 8.6–10.5)
CHLORIDE SERPL-SCNC: 101 MMOL/L (ref 98–107)
CO2 SERPL-SCNC: 25.7 MMOL/L (ref 22–29)
CREAT SERPL-MCNC: 4.58 MG/DL (ref 0.76–1.27)
DEPRECATED RDW RBC AUTO: 46.8 FL (ref 37–54)
EGFRCR SERPLBLD CKD-EPI 2021: 12.6 ML/MIN/1.73
EOSINOPHIL # BLD AUTO: 0.16 10*3/MM3 (ref 0–0.4)
EOSINOPHIL NFR BLD AUTO: 2.9 % (ref 0.3–6.2)
ERYTHROCYTE [DISTWIDTH] IN BLOOD BY AUTOMATED COUNT: 14.3 % (ref 12.3–15.4)
FERRITIN SERPL-MCNC: 511 NG/ML (ref 30–400)
GLUCOSE SERPL-MCNC: 242 MG/DL (ref 65–99)
HCT VFR BLD AUTO: 31.7 % (ref 37.5–51)
HGB BLD-MCNC: 9.8 G/DL (ref 13–17.7)
INR PPP: 1.03 (ref 0.86–1.15)
IRON 24H UR-MRATE: 94 MCG/DL (ref 59–158)
IRON SATN MFR SERPL: 29 % (ref 20–50)
LYMPHOCYTES # BLD AUTO: 1.49 10*3/MM3 (ref 0.7–3.1)
LYMPHOCYTES NFR BLD AUTO: 26.7 % (ref 19.6–45.3)
MCH RBC QN AUTO: 28.7 PG (ref 26.6–33)
MCHC RBC AUTO-ENTMCNC: 30.9 G/DL (ref 31.5–35.7)
MCV RBC AUTO: 93 FL (ref 79–97)
MONOCYTES # BLD AUTO: 0.37 10*3/MM3 (ref 0.1–0.9)
MONOCYTES NFR BLD AUTO: 6.6 % (ref 5–12)
NEUTROPHILS NFR BLD AUTO: 3.52 10*3/MM3 (ref 1.7–7)
NEUTROPHILS NFR BLD AUTO: 62.9 % (ref 42.7–76)
PHOSPHATE SERPL-MCNC: 5.3 MG/DL (ref 2.5–4.5)
PLATELET # BLD AUTO: 98 10*3/MM3 (ref 140–450)
PMV BLD AUTO: 13.3 FL (ref 6–12)
POTASSIUM SERPL-SCNC: 4.5 MMOL/L (ref 3.5–5.2)
PROTHROMBIN TIME: 13.6 SECONDS (ref 11.8–14.9)
RBC # BLD AUTO: 3.41 10*6/MM3 (ref 4.14–5.8)
SODIUM SERPL-SCNC: 142 MMOL/L (ref 136–145)
TIBC SERPL-MCNC: 326 MCG/DL (ref 298–536)
TRANSFERRIN SERPL-MCNC: 219 MG/DL (ref 200–360)
URATE SERPL-MCNC: 3.4 MG/DL (ref 3.4–7)
WBC NRBC COR # BLD: 5.59 10*3/MM3 (ref 3.4–10.8)

## 2022-08-11 PROCEDURE — 85025 COMPLETE CBC W/AUTO DIFF WBC: CPT

## 2022-08-11 PROCEDURE — 36415 COLL VENOUS BLD VENIPUNCTURE: CPT

## 2022-08-11 PROCEDURE — 85610 PROTHROMBIN TIME: CPT

## 2022-08-11 PROCEDURE — 84550 ASSAY OF BLOOD/URIC ACID: CPT

## 2022-08-11 PROCEDURE — 82728 ASSAY OF FERRITIN: CPT

## 2022-08-11 PROCEDURE — 84466 ASSAY OF TRANSFERRIN: CPT

## 2022-08-11 PROCEDURE — 83540 ASSAY OF IRON: CPT

## 2022-08-11 PROCEDURE — 80069 RENAL FUNCTION PANEL: CPT

## 2022-08-12 ENCOUNTER — LAB (OUTPATIENT)
Dept: LAB | Facility: HOSPITAL | Age: 77
End: 2022-08-12

## 2022-08-12 DIAGNOSIS — N18.5 CHRONIC KIDNEY DISEASE, STAGE V: ICD-10-CM

## 2022-08-12 DIAGNOSIS — N18.4 CHRONIC KIDNEY DISEASE, STAGE IV (SEVERE): ICD-10-CM

## 2022-08-12 LAB — APTT PPP: 27.5 SECONDS (ref 24.2–34.2)

## 2022-08-12 PROCEDURE — 85730 THROMBOPLASTIN TIME PARTIAL: CPT

## 2022-08-12 PROCEDURE — 36415 COLL VENOUS BLD VENIPUNCTURE: CPT

## 2022-09-06 ENCOUNTER — OFFICE VISIT (OUTPATIENT)
Dept: SURGERY | Facility: CLINIC | Age: 77
End: 2022-09-06

## 2022-09-06 VITALS — BODY MASS INDEX: 24.53 KG/M2 | HEIGHT: 66 IN

## 2022-09-06 DIAGNOSIS — N18.4 CKD (CHRONIC KIDNEY DISEASE) STAGE 4, GFR 15-29 ML/MIN: Primary | ICD-10-CM

## 2022-09-06 PROCEDURE — 99203 OFFICE O/P NEW LOW 30 MIN: CPT | Performed by: SURGERY

## 2022-09-06 NOTE — PROGRESS NOTES
Chief Complaint   Patient presents with   • PD CATHETER       Subjective      Nelson Wang is a 77 y.o. male who is referred by Coreen Castro MD to be evaluated for peritoneal dialysis catheter placement. Patient has CKD STAGE 4 secondary to diabetic nephropathy and hypertensive nephrosclerosis and  is not on dialysis. Patient does not have a AV access.  Patient has not had a recent intraabdominal infection. The patient reports having reguar bowel movements.  Patient did have a Colonoscopy.     Home Evaluation: YES    Past Medical History:   Diagnosis Date   • Anemia    • CKD (chronic kidney disease), stage IV (HCC)    • Diabetes (HCC)    • Gout    • High cholesterol    • HL (hearing loss)    • Hyperkalemia    • Hypertension    • Hypothyroidism    • Psoriasis    • Vitiligo        Past Surgical History:   Procedure Laterality Date   • ANKLE SURGERY  11/1990   • APPENDECTOMY N/A 1954   • COLONOSCOPY N/A 06/2022    [facility]   • HIP BIPOLAR REPLACEMENT Right 01/2000   • RENAL BIOPSY Left 07/15/2022         Current Outpatient Medications:   •  allopurinol (ZYLOPRIM) 100 MG tablet, Take 200 mg by mouth 2 (Two) Times a Day., Disp: , Rfl:   •  ascorbic acid (VITAMIN C) 1000 MG tablet, Take 1,000 mg by mouth., Disp: , Rfl:   •  atorvastatin (Lipitor) 40 MG tablet, Take 1 tablet by mouth Daily., Disp: 90 tablet, Rfl: 3  •  Calcium Carbonate-Vitamin D (calcium-vitamin D) 500-200 MG-UNIT tablet per tablet, Take 1 tablet by mouth., Disp: , Rfl:   •  carvedilol (COREG) 25 MG tablet, Take 25 mg by mouth 2 (Two) Times a Day With Meals., Disp: , Rfl:   •  Euthyrox 75 MCG tablet, Take 1 tablet by mouth once daily, Disp: 90 tablet, Rfl: 3  •  Insulin Glargine (LANTUS SOLOSTAR) 100 UNIT/ML injection pen, Inject 30 Units under the skin into the appropriate area as directed Daily. (Patient taking differently: Inject 14 Units under the skin into the appropriate area as directed Every Night.), Disp: 15 pen, Rfl: 0  •  Omega-3  Fatty Acids (fish oil) 1000 MG capsule capsule, Take  by mouth., Disp: , Rfl:   •  Risankizumab-rzaa (SKYRIZI, 150 MG DOSE, SC), Inject  under the skin into the appropriate area as directed. Once every 3 months, Disp: , Rfl:   •  sertraline (ZOLOFT) 100 MG tablet, Take 100 mg by mouth Daily., Disp: , Rfl:   •  Torsemide 40 MG tablet, Take 40 mg by mouth 1 (One) Time., Disp: , Rfl:   •  Turmeric 500 MG capsule, Take  by mouth., Disp: , Rfl:     No Known Allergies    Family History   Problem Relation Age of Onset   • Cancer Sister 35   • Diabetes Sister    • Diabetes Mother    • Heart disease Father    • Diabetes Brother    • Heart disease Paternal Uncle        Social History     Socioeconomic History   • Marital status:    Tobacco Use   • Smoking status: Former Smoker     Packs/day: 1.00     Years: 10.00     Pack years: 10.00     Types: Cigarettes     Start date:      Quit date:      Years since quittin.6   • Smokeless tobacco: Never Used   • Tobacco comment: quit 25 years ago, chews nicotine gum   Vaping Use   • Vaping Use: Never used   Substance and Sexual Activity   • Alcohol use: Not Currently     Comment: 1 DRINK/DAY, 12 oz weekly   • Drug use: Never   • Sexual activity: Defer     REVIEW OF SYSTEMS    Review of Systems   Constitutional: Negative for activity change, appetite change and fatigue.   HENT: Negative for congestion, sinus pressure and sinus pain.    Eyes: Negative for discharge and itching.   Respiratory: Negative for apnea, cough and shortness of breath.    Cardiovascular: Negative for chest pain and leg swelling.   Gastrointestinal: Negative for abdominal distention, blood in stool, constipation and diarrhea.   Endocrine: Negative for cold intolerance and heat intolerance.   Genitourinary: Negative for difficulty urinating, frequency and hematuria.   Musculoskeletal: Negative for arthralgias and back pain.   Skin: Negative for color change.   Allergic/Immunologic: Negative for  "environmental allergies and food allergies.   Neurological: Negative for dizziness, light-headedness and numbness.   Hematological: Negative for adenopathy. Does not bruise/bleed easily.   Psychiatric/Behavioral: Negative for agitation and hallucinations.       Physical Examination  Ht 167.6 cm (66\")   BMI 24.53 kg/m²   Body mass index is 24.53 kg/m².  Physical Exam  HENT:      Head: Normocephalic and atraumatic.      Nose: Nose normal.      Mouth/Throat:      Pharynx: Oropharynx is clear.   Eyes:      Conjunctiva/sclera: Conjunctivae normal.   Pulmonary:      Effort: Pulmonary effort is normal.   Abdominal:      General: Bowel sounds are normal. There is no distension.      Palpations: Abdomen is soft. There is no mass.      Tenderness: There is no abdominal tenderness.      Hernia: No hernia is present.      Comments: Well-healed right paramedian incision,   Genitourinary:     Penis: Normal.       Testes: Normal.   Musculoskeletal:         General: Normal range of motion.      Cervical back: Normal range of motion and neck supple.   Skin:     General: Skin is warm.      Comments: Multiple skin hypopigmented areas consistent with vitiligo   Neurological:      General: No focal deficit present.      Mental Status: He is alert. Mental status is at baseline.   Psychiatric:         Mood and Affect: Mood normal.         Behavior: Behavior normal.       Labs:   8/11/2022  White blood cell count 5.5, hemoglobin 9.8, hematocrit 31.7, platelets 98  Glucose 242, BUN 67, creatinine 4.5, phosphorus 5.3  Uric acid 3.4  IRON profile normal    Assessment:   Nelson Wang is a 77 y.o. male with CKD due to  hypertensive nephrosclerosis that is not on dialysis and will need peritoneal dialysis catheter placement.       The procedure was explained in detail to the patient including risks and benefits.  The benefits including the possibility of having dialysis at home without the side effects of the hemodialysis.  The risks " including but not limited to catheter dislodgment, obstruction, malfunction, bleeding, infection and possible injury to surrounding organs during peritoneal access.  The patient understands that the catheter will not be used for dialysis for a period of approximately 2 weeks after its placement and that during this time they should not shower until the exit site is completely healed. The patient underwent at least one training session with the peritoneal dialysis nurse and their house was evaluated and is ready for the peritoneal dialysis. Patient verbalized understanding and agreed with the plan. All questions were answered at this time.      Plan:     -Patient to call whenever ready for peritoneal dialysis catheter placement      Jose Berry MD  General, Minimally Invasive and Endoscopic Surgery  [facility]    [address], [address]  [place], [place], [address]  P: [phone]  F: [phone]

## 2022-09-14 RX ORDER — ATORVASTATIN CALCIUM 40 MG/1
40 TABLET, FILM COATED ORAL DAILY
Qty: 90 TABLET | Refills: 3 | Status: CANCELLED | OUTPATIENT
Start: 2022-09-14

## 2022-09-14 NOTE — TELEPHONE ENCOUNTER
Caller: Nelson Wang    Relationship: Self    Best call back number: [phone] ([phone]    Requested Prescriptions:   Requested Prescriptions     Pending Prescriptions Disp Refills   • Insulin Glargine (LANTUS SOLOSTAR) 100 UNIT/ML injection pen       Sig: Inject 30 Units under the skin into the appropriate area as directed Daily.   • atorvastatin (Lipitor) 40 MG tablet 90 tablet 3     Sig: Take 1 tablet by mouth Daily.   • carvedilol (COREG) 25 MG tablet       Sig: Take 1 tablet by mouth 2 (Two) Times a Day With Meals.        Pharmacy where request should be sent: [address] [place] [address] [facility] [phone] [facility] [phone] FX    Does the patient have less than a 3 day supply:  [x] Yes  [] No    Randell Jasso Rep   09/14/22 11:31 EDT

## 2022-09-15 RX ORDER — CARVEDILOL 25 MG/1
25 TABLET ORAL 2 TIMES DAILY WITH MEALS
Qty: 180 TABLET | Refills: 1 | Status: SHIPPED | OUTPATIENT
Start: 2022-09-15 | End: 2022-09-20

## 2022-09-16 RX ORDER — ATORVASTATIN CALCIUM 40 MG/1
40 TABLET, FILM COATED ORAL DAILY
Qty: 90 TABLET | Refills: 3 | Status: SHIPPED | OUTPATIENT
Start: 2022-09-16

## 2022-09-20 ENCOUNTER — LAB (OUTPATIENT)
Dept: LAB | Facility: HOSPITAL | Age: 77
End: 2022-09-20

## 2022-09-20 ENCOUNTER — TRANSCRIBE ORDERS (OUTPATIENT)
Dept: LAB | Facility: HOSPITAL | Age: 77
End: 2022-09-20

## 2022-09-20 DIAGNOSIS — N18.5 CHRONIC KIDNEY DISEASE, STAGE V: ICD-10-CM

## 2022-09-20 DIAGNOSIS — N18.5 CHRONIC KIDNEY DISEASE, STAGE V: Primary | ICD-10-CM

## 2022-09-20 LAB
ALBUMIN SERPL-MCNC: 4.3 G/DL (ref 3.5–5.2)
ANION GAP SERPL CALCULATED.3IONS-SCNC: 10.2 MMOL/L (ref 5–15)
BASOPHILS # BLD AUTO: 0.04 10*3/MM3 (ref 0–0.2)
BASOPHILS NFR BLD AUTO: 0.6 % (ref 0–1.5)
BUN SERPL-MCNC: 48 MG/DL (ref 8–23)
BUN/CREAT SERPL: 12.7 (ref 7–25)
CALCIUM SPEC-SCNC: 9.7 MG/DL (ref 8.6–10.5)
CHLORIDE SERPL-SCNC: 104 MMOL/L (ref 98–107)
CO2 SERPL-SCNC: 25.8 MMOL/L (ref 22–29)
CREAT SERPL-MCNC: 3.79 MG/DL (ref 0.76–1.27)
DEPRECATED RDW RBC AUTO: 47.6 FL (ref 37–54)
EGFRCR SERPLBLD CKD-EPI 2021: 15.8 ML/MIN/1.73
EOSINOPHIL # BLD AUTO: 0.27 10*3/MM3 (ref 0–0.4)
EOSINOPHIL NFR BLD AUTO: 4.1 % (ref 0.3–6.2)
ERYTHROCYTE [DISTWIDTH] IN BLOOD BY AUTOMATED COUNT: 14.4 % (ref 12.3–15.4)
GLUCOSE SERPL-MCNC: 162 MG/DL (ref 65–99)
HCT VFR BLD AUTO: 34.3 % (ref 37.5–51)
HGB BLD-MCNC: 10.6 G/DL (ref 13–17.7)
IMM GRANULOCYTES # BLD AUTO: 0.02 10*3/MM3 (ref 0–0.05)
IMM GRANULOCYTES NFR BLD AUTO: 0.3 % (ref 0–0.5)
LYMPHOCYTES # BLD AUTO: 2.1 10*3/MM3 (ref 0.7–3.1)
LYMPHOCYTES NFR BLD AUTO: 32.2 % (ref 19.6–45.3)
MCH RBC QN AUTO: 28.4 PG (ref 26.6–33)
MCHC RBC AUTO-ENTMCNC: 30.9 G/DL (ref 31.5–35.7)
MCV RBC AUTO: 92 FL (ref 79–97)
MONOCYTES # BLD AUTO: 0.48 10*3/MM3 (ref 0.1–0.9)
MONOCYTES NFR BLD AUTO: 7.4 % (ref 5–12)
NEUTROPHILS NFR BLD AUTO: 3.62 10*3/MM3 (ref 1.7–7)
NEUTROPHILS NFR BLD AUTO: 55.4 % (ref 42.7–76)
NRBC BLD AUTO-RTO: 0 /100 WBC (ref 0–0.2)
PHOSPHATE SERPL-MCNC: 4.3 MG/DL (ref 2.5–4.5)
PLATELET # BLD AUTO: 137 10*3/MM3 (ref 140–450)
PMV BLD AUTO: 11.5 FL (ref 6–12)
POTASSIUM SERPL-SCNC: 4.9 MMOL/L (ref 3.5–5.2)
RBC # BLD AUTO: 3.73 10*6/MM3 (ref 4.14–5.8)
SODIUM SERPL-SCNC: 140 MMOL/L (ref 136–145)
WBC NRBC COR # BLD: 6.53 10*3/MM3 (ref 3.4–10.8)

## 2022-09-20 PROCEDURE — 80069 RENAL FUNCTION PANEL: CPT

## 2022-09-20 PROCEDURE — 36415 COLL VENOUS BLD VENIPUNCTURE: CPT

## 2022-09-20 PROCEDURE — 85025 COMPLETE CBC W/AUTO DIFF WBC: CPT

## 2022-09-20 RX ORDER — CARVEDILOL 25 MG/1
25 TABLET ORAL 2 TIMES DAILY WITH MEALS
Qty: 90 TABLET | Refills: 1 | Status: SHIPPED | OUTPATIENT
Start: 2022-09-20 | End: 2023-03-22

## 2022-09-22 ENCOUNTER — PRIOR AUTHORIZATION (OUTPATIENT)
Dept: INTERNAL MEDICINE | Facility: CLINIC | Age: 77
End: 2022-09-22

## 2022-11-29 ENCOUNTER — TRANSCRIBE ORDERS (OUTPATIENT)
Dept: LAB | Facility: HOSPITAL | Age: 77
End: 2022-11-29

## 2022-11-29 ENCOUNTER — LAB (OUTPATIENT)
Dept: LAB | Facility: HOSPITAL | Age: 77
End: 2022-11-29

## 2022-11-29 DIAGNOSIS — N18.5 CHRONIC KIDNEY DISEASE, STAGE V: ICD-10-CM

## 2022-11-29 DIAGNOSIS — E55.9 AVITAMINOSIS D: ICD-10-CM

## 2022-11-29 DIAGNOSIS — E78.5 HYPERLIPIDEMIA LDL GOAL <70: ICD-10-CM

## 2022-11-29 DIAGNOSIS — E03.9 ACQUIRED HYPOTHYROIDISM: ICD-10-CM

## 2022-11-29 DIAGNOSIS — I10 ESSENTIAL HYPERTENSION: ICD-10-CM

## 2022-11-29 DIAGNOSIS — N18.5 CHRONIC KIDNEY DISEASE, STAGE V: Primary | ICD-10-CM

## 2022-11-29 DIAGNOSIS — N18.4 CKD (CHRONIC KIDNEY DISEASE) STAGE 4, GFR 15-29 ML/MIN: ICD-10-CM

## 2022-11-29 LAB
ALBUMIN SERPL-MCNC: 4.2 G/DL (ref 3.5–5.2)
ALBUMIN/GLOB SERPL: 1.4 G/DL
ALP SERPL-CCNC: 80 U/L (ref 39–117)
ALT SERPL W P-5'-P-CCNC: 16 U/L (ref 1–41)
ANION GAP SERPL CALCULATED.3IONS-SCNC: 11.4 MMOL/L (ref 5–15)
AST SERPL-CCNC: 19 U/L (ref 1–40)
BACTERIA UR QL AUTO: NORMAL /HPF
BASOPHILS # BLD AUTO: 0.04 10*3/MM3 (ref 0–0.2)
BASOPHILS NFR BLD AUTO: 0.6 % (ref 0–1.5)
BILIRUB SERPL-MCNC: 0.4 MG/DL (ref 0–1.2)
BILIRUB UR QL STRIP: NEGATIVE
BUN SERPL-MCNC: 58 MG/DL (ref 8–23)
BUN/CREAT SERPL: 11.4 (ref 7–25)
CALCIUM SPEC-SCNC: 9.8 MG/DL (ref 8.6–10.5)
CHLORIDE SERPL-SCNC: 104 MMOL/L (ref 98–107)
CHOLEST SERPL-MCNC: 128 MG/DL (ref 0–200)
CLARITY UR: CLEAR
CO2 SERPL-SCNC: 25.6 MMOL/L (ref 22–29)
COLOR UR: YELLOW
CREAT SERPL-MCNC: 5.11 MG/DL (ref 0.76–1.27)
CREAT UR-MCNC: 73.3 MG/DL
DEPRECATED RDW RBC AUTO: 48.4 FL (ref 37–54)
EGFRCR SERPLBLD CKD-EPI 2021: 11 ML/MIN/1.73
EOSINOPHIL # BLD AUTO: 0.33 10*3/MM3 (ref 0–0.4)
EOSINOPHIL NFR BLD AUTO: 5.3 % (ref 0.3–6.2)
ERYTHROCYTE [DISTWIDTH] IN BLOOD BY AUTOMATED COUNT: 14.5 % (ref 12.3–15.4)
GLOBULIN UR ELPH-MCNC: 3 GM/DL
GLUCOSE SERPL-MCNC: 214 MG/DL (ref 65–99)
GLUCOSE UR STRIP-MCNC: NEGATIVE MG/DL
HCT VFR BLD AUTO: 32.2 % (ref 37.5–51)
HDLC SERPL-MCNC: 43 MG/DL (ref 40–60)
HGB BLD-MCNC: 10.1 G/DL (ref 13–17.7)
HGB UR QL STRIP.AUTO: NEGATIVE
HYALINE CASTS UR QL AUTO: NORMAL /LPF
IMM GRANULOCYTES # BLD AUTO: 0.01 10*3/MM3 (ref 0–0.05)
IMM GRANULOCYTES NFR BLD AUTO: 0.2 % (ref 0–0.5)
KETONES UR QL STRIP: NEGATIVE
LDLC SERPL CALC-MCNC: 60 MG/DL (ref 0–100)
LDLC/HDLC SERPL: 1.32 {RATIO}
LEUKOCYTE ESTERASE UR QL STRIP.AUTO: NEGATIVE
LYMPHOCYTES # BLD AUTO: 2.04 10*3/MM3 (ref 0.7–3.1)
LYMPHOCYTES NFR BLD AUTO: 32.9 % (ref 19.6–45.3)
MAGNESIUM SERPL-MCNC: 1.7 MG/DL (ref 1.6–2.4)
MCH RBC QN AUTO: 28.8 PG (ref 26.6–33)
MCHC RBC AUTO-ENTMCNC: 31.4 G/DL (ref 31.5–35.7)
MCV RBC AUTO: 91.7 FL (ref 79–97)
MONOCYTES # BLD AUTO: 0.49 10*3/MM3 (ref 0.1–0.9)
MONOCYTES NFR BLD AUTO: 7.9 % (ref 5–12)
NEUTROPHILS NFR BLD AUTO: 3.29 10*3/MM3 (ref 1.7–7)
NEUTROPHILS NFR BLD AUTO: 53.1 % (ref 42.7–76)
NITRITE UR QL STRIP: NEGATIVE
NRBC BLD AUTO-RTO: 0 /100 WBC (ref 0–0.2)
PH UR STRIP.AUTO: 6 [PH] (ref 5–8)
PHOSPHATE SERPL-MCNC: 4 MG/DL (ref 2.5–4.5)
PLATELET # BLD AUTO: 129 10*3/MM3 (ref 140–450)
PMV BLD AUTO: 12.1 FL (ref 6–12)
POTASSIUM SERPL-SCNC: 4.6 MMOL/L (ref 3.5–5.2)
PROT ?TM UR-MCNC: 535.6 MG/DL
PROT SERPL-MCNC: 7.2 G/DL (ref 6–8.5)
PROT UR QL STRIP: ABNORMAL
PROT/CREAT UR: 7.31 MG/G{CREAT}
RBC # BLD AUTO: 3.51 10*6/MM3 (ref 4.14–5.8)
RBC # UR STRIP: NORMAL /HPF
REF LAB TEST METHOD: NORMAL
SODIUM SERPL-SCNC: 141 MMOL/L (ref 136–145)
SP GR UR STRIP: 1.01 (ref 1–1.03)
SQUAMOUS #/AREA URNS HPF: NORMAL /HPF
TRIGL SERPL-MCNC: 141 MG/DL (ref 0–150)
UROBILINOGEN UR QL STRIP: ABNORMAL
VLDLC SERPL-MCNC: 25 MG/DL (ref 5–40)
WBC # UR STRIP: NORMAL /HPF
WBC NRBC COR # BLD: 6.2 10*3/MM3 (ref 3.4–10.8)

## 2022-11-29 PROCEDURE — 82306 VITAMIN D 25 HYDROXY: CPT

## 2022-11-29 PROCEDURE — 80061 LIPID PANEL: CPT

## 2022-11-29 PROCEDURE — 84156 ASSAY OF PROTEIN URINE: CPT

## 2022-11-29 PROCEDURE — 83735 ASSAY OF MAGNESIUM: CPT

## 2022-11-29 PROCEDURE — 84100 ASSAY OF PHOSPHORUS: CPT

## 2022-11-29 PROCEDURE — 82570 ASSAY OF URINE CREATININE: CPT

## 2022-11-29 PROCEDURE — 83970 ASSAY OF PARATHORMONE: CPT

## 2022-11-29 PROCEDURE — 36415 COLL VENOUS BLD VENIPUNCTURE: CPT

## 2022-11-29 PROCEDURE — 80053 COMPREHEN METABOLIC PANEL: CPT

## 2022-11-29 PROCEDURE — 85025 COMPLETE CBC W/AUTO DIFF WBC: CPT

## 2022-11-29 PROCEDURE — 81001 URINALYSIS AUTO W/SCOPE: CPT

## 2022-11-30 LAB
25(OH)D3 SERPL-MCNC: 50.2 NG/ML (ref 30–100)
PTH-INTACT SERPL-MCNC: 119 PG/ML (ref 15–65)

## 2022-12-05 ENCOUNTER — OFFICE VISIT (OUTPATIENT)
Dept: INTERNAL MEDICINE | Facility: CLINIC | Age: 77
End: 2022-12-05

## 2022-12-05 VITALS
OXYGEN SATURATION: 99 % | TEMPERATURE: 98 F | DIASTOLIC BLOOD PRESSURE: 56 MMHG | BODY MASS INDEX: 25.23 KG/M2 | HEART RATE: 67 BPM | WEIGHT: 157 LBS | HEIGHT: 66 IN | SYSTOLIC BLOOD PRESSURE: 138 MMHG

## 2022-12-05 DIAGNOSIS — E11.9 TYPE 2 DIABETES MELLITUS WITHOUT COMPLICATION, WITHOUT LONG-TERM CURRENT USE OF INSULIN: ICD-10-CM

## 2022-12-05 DIAGNOSIS — N18.4 ANEMIA DUE TO STAGE 4 CHRONIC KIDNEY DISEASE: ICD-10-CM

## 2022-12-05 DIAGNOSIS — D63.1 ANEMIA DUE TO STAGE 4 CHRONIC KIDNEY DISEASE: ICD-10-CM

## 2022-12-05 DIAGNOSIS — E03.9 ACQUIRED HYPOTHYROIDISM: ICD-10-CM

## 2022-12-05 DIAGNOSIS — D47.2 MONOCLONAL GAMMOPATHY OF UNKNOWN SIGNIFICANCE (MGUS): ICD-10-CM

## 2022-12-05 DIAGNOSIS — D69.6 THROMBOCYTOPENIA: ICD-10-CM

## 2022-12-05 DIAGNOSIS — I10 ESSENTIAL HYPERTENSION: ICD-10-CM

## 2022-12-05 DIAGNOSIS — E78.5 HYPERLIPIDEMIA, UNSPECIFIED HYPERLIPIDEMIA TYPE: Primary | ICD-10-CM

## 2022-12-05 DIAGNOSIS — N18.4 CKD (CHRONIC KIDNEY DISEASE), STAGE IV: ICD-10-CM

## 2022-12-05 PROBLEM — N18.5 STAGE 5 CHRONIC KIDNEY DISEASE: Status: ACTIVE | Noted: 2022-12-01

## 2022-12-05 PROBLEM — N18.5 STAGE 5 CHRONIC KIDNEY DISEASE: Status: RESOLVED | Noted: 2022-12-01 | Resolved: 2022-12-05

## 2022-12-05 PROCEDURE — G0439 PPPS, SUBSEQ VISIT: HCPCS | Performed by: INTERNAL MEDICINE

## 2022-12-05 PROCEDURE — 99213 OFFICE O/P EST LOW 20 MIN: CPT | Performed by: INTERNAL MEDICINE

## 2022-12-05 PROCEDURE — 1159F MED LIST DOCD IN RCRD: CPT | Performed by: INTERNAL MEDICINE

## 2022-12-05 RX ORDER — CALCIUM ACETATE 667 MG/1
1 CAPSULE ORAL 3 TIMES DAILY
COMMUNITY
Start: 2022-09-21 | End: 2023-09-21

## 2022-12-05 RX ORDER — SERTRALINE HYDROCHLORIDE 100 MG/1
100 TABLET, FILM COATED ORAL DAILY
Qty: 90 TABLET | Refills: 1 | Status: SHIPPED | OUTPATIENT
Start: 2022-12-05

## 2022-12-05 RX ORDER — CLOBETASOL PROPIONATE 0.5 MG/G
OINTMENT TOPICAL
COMMUNITY
Start: 2022-09-16

## 2022-12-05 NOTE — PROGRESS NOTES
Chief Complaint  Hospital Follow Up Visit (Renal shut down at [facility] for 5 days dr redd in [place] for catheter insertion for dialysis pre op.  Thinks renal shut down was from too much protein )    Subjective          Nelson Wang presents to [facility]  History of Present Illness       The patient presents today for a follow-up.     The patient reports that after his last visit, he was working in his garden outside and suddenly his kidneys shut off. He states that he started feeling strange and shaky. The patient reports that his daughter took him to the emergency room. He states that his legs were swelling and there was a lot of fluid collected. The patient reports that he was given 80 mg of Lasix IV. He states that he waited for 2 to 3 hours and nothing happened. He reports that there wasn't anything else that they could do. The patient reports that his daughter took him by car to [place] where he was admitted. The patient states that they could not find anything except that his kidneys shut down. He reports that there were no cardiovascular problems. The patient reports that he had a lot of positional dyspnea. He states that he could not breathe if he laid down. The patient reports that he gained almost 20 to 30 pounds. He states that over the next 5 days or so, they changed the Lasix to torsemide 40 mg tablets twice a day. The patient reports that the torsemide started working pretty well and he lost a lot of weight. He states that within 5 days he was down to about almost normal. The patient reports that he was breathing better and there was no dyspnea or shortness of breath. He states that he was told to continue the torsemide. The patient reports that he saw Dr. Castro a couple of times in the meantime after that and he sent her to Dr. Redd in [place] to do the catheter implantation. He states that when he came back from the preop visit, he told   Gloria that before he had the cath implantation, he wanted to go to [facility] to see his sister. She states that she went for 6 weeks to [facility] from 10/10/2022 to 11/24/2022. The patient reports that she came back and saw Dr. Castro again and he thinks he did really well. He states that he has no confusion or any taste difference, none of the symptoms of uremia. The patient reports that he has no fluid retention. She states that her creatinine has gone up a little bit. The patient reports that she is on torsemide 40 mg daily. He states that Dr. Castro thinks that since he was a little dehydrated, he might want to alternate the dose 40 mg, 20mg, 40 mg, 20 mg. The patient reports that he still does not have the catheter in place. He states that he is going to see Dr. Castro again in early 02/2023 and he will decide when the catheter will be placed. The patient reports that he had been eating a high protein diet and low carb diet at the time. He was eating a lot of chicken and protein bars. He believes that this may have cause a protein overload. He states that he eats a little chicken once in a while and an egg once or twice a week, his diet is mainly a vegetarian diet. The patient reports that the rest of the time he is on carbohydrates. He denies any chest pain. The patient reports that his breathing is doing well. He states that the fluid on her legs is much better. The patient reports that his weight is stable from where it was in 08/2022. He states that he does her sudoku to check his thinking and problem solving. The patient reports that he feels good with that right now. The patient reports that her hemoglobin was 10.5. She states that it used to be 8. The patient reports that he was given iron injections while he was in [place]. He states that he feels like those made a big difference for him.     Anxiety and Depression   The patient reports that he needs a prescription for Zoloft. He states that he is running out  "of it. The patient reports that it is working well for him.     Diabetes   The patient reports that his sugar was a little high. He states that he had increased the insulin to 25 units daily. The patient reports that he gets lows with that. He states that he starts feeling weak, hungry, and maybe a little dizzy. The patient reports that he would go and eat something when he is feeling this way.      Healthcare maintenance    The patient reports that he does not have an advanced directive or a living will. He states that his daughter would be his decision maker.  He states that she has been doing a lot of meditation and listening to boost lectures. The patient reports that he had shingles vaccination about 2 to 3 years ago. She states that he had an attack of shingles on his arm about 2 years ago. He reports that he has had an influenza vaccination.             Objective   Vital Signs:   /56 (BP Location: Left arm, Patient Position: Sitting, Cuff Size: Adult)   Pulse 67   Temp 98 °F (36.7 °C) (Infrared)   Ht 167.6 cm (66\")   Wt 71.2 kg (157 lb)   SpO2 99%   BMI 25.34 kg/m²     Physical Exam  Vitals reviewed.   Constitutional:       Appearance: Normal appearance. He is well-developed.   HENT:      Head: Normocephalic and atraumatic.      Right Ear: External ear normal.      Left Ear: External ear normal.   Eyes:      Conjunctiva/sclera: Conjunctivae normal.      Pupils: Pupils are equal, round, and reactive to light.   Cardiovascular:      Rate and Rhythm: Normal rate and regular rhythm.      Heart sounds: No murmur heard.    No friction rub. No gallop.   Pulmonary:      Effort: Pulmonary effort is normal.      Breath sounds: Normal breath sounds. No wheezing or rhonchi.   Skin:     General: Skin is warm and dry.   Neurological:      Mental Status: He is alert and oriented to person, place, and time.   Psychiatric:         Mood and Affect: Affect normal.         Behavior: Behavior normal.         Thought " Content: Thought content normal.        Result Review :       Common labs    Common Labs 8/11/22 8/11/22 8/11/22 9/20/22 9/20/22 11/29/22 11/29/22 11/29/22    1237 1237 1237 1406 1406 1035 1035 1035   Glucose  242 (A)  162 (A)    214 (A)   BUN  67 (A)  48 (A)    58 (A)   Creatinine  4.58 (A)  3.79 (A)    5.11 (A)   Sodium  142  140    141   Potassium  4.5  4.9    4.6   Chloride  101  104    104   Calcium  9.6  9.7    9.8   Albumin  3.80  4.30    4.20   Total Bilirubin        0.4   Alkaline Phosphatase        80   AST (SGOT)        19   ALT (SGPT)        16   WBC 5.59    6.53 6.20     Hemoglobin 9.8 (A)    10.6 (A) 10.1 (A)     Hematocrit 31.7 (A)    34.3 (A) 32.2 (A)     Platelets 98 (A)    137 (A) 129 (A)     Total Cholesterol       128    Triglycerides       141    HDL Cholesterol       43    LDL Cholesterol        60    Uric Acid   3.4        (A) Abnormal value              Results for orders placed or performed in visit on 11/29/22   PTH, Intact    Specimen: Blood   Result Value Ref Range    PTH, Intact 119.0 (H) 15.0 - 65.0 pg/mL   Urinalysis With Microscopic If Indicated (No Culture) - Urine, Clean Catch    Specimen: Urine, Clean Catch   Result Value Ref Range    Color, UA Yellow Yellow, Straw    Appearance, UA Clear Clear    pH, UA 6.0 5.0 - 8.0    Specific Gravity, UA 1.015 1.005 - 1.030    Glucose, UA Negative Negative    Ketones, UA Negative Negative    Bilirubin, UA Negative Negative    Blood, UA Negative Negative    Protein, UA >=300 mg/dL (3+) (A) Negative    Leuk Esterase, UA Negative Negative    Nitrite, UA Negative Negative    Urobilinogen, UA 0.2 E.U./dL 0.2 - 1.0 E.U./dL   Vitamin D,25-Hydroxy    Specimen: Blood   Result Value Ref Range    25 Hydroxy, Vitamin D 50.2 30.0 - 100.0 ng/ml   Protein / Creatinine Ratio, Urine - Urine, Clean Catch    Specimen: Urine, Clean Catch   Result Value Ref Range    Creatinine, Urine 73.3 mg/dL    Total Protein, Urine 535.6 mg/dL    Protein/Creatinine Ratio, Urine  7.31    Lipid Panel    Specimen: Blood   Result Value Ref Range    Total Cholesterol 128 0 - 200 mg/dL    Triglycerides 141 0 - 150 mg/dL    HDL Cholesterol 43 40 - 60 mg/dL    LDL Cholesterol  60 0 - 100 mg/dL    VLDL Cholesterol 25 5 - 40 mg/dL    LDL/HDL Ratio 1.32    Comprehensive Metabolic Panel    Specimen: Blood   Result Value Ref Range    Glucose 214 (H) 65 - 99 mg/dL    BUN 58 (H) 8 - 23 mg/dL    Creatinine 5.11 (H) 0.76 - 1.27 mg/dL    Sodium 141 136 - 145 mmol/L    Potassium 4.6 3.5 - 5.2 mmol/L    Chloride 104 98 - 107 mmol/L    CO2 25.6 22.0 - 29.0 mmol/L    Calcium 9.8 8.6 - 10.5 mg/dL    Total Protein 7.2 6.0 - 8.5 g/dL    Albumin 4.20 3.50 - 5.20 g/dL    ALT (SGPT) 16 1 - 41 U/L    AST (SGOT) 19 1 - 40 U/L    Alkaline Phosphatase 80 39 - 117 U/L    Total Bilirubin 0.4 0.0 - 1.2 mg/dL    Globulin 3.0 gm/dL    A/G Ratio 1.4 g/dL    BUN/Creatinine Ratio 11.4 7.0 - 25.0    Anion Gap 11.4 5.0 - 15.0 mmol/L    eGFR 11.0 (L) >60.0 mL/min/1.73   Magnesium    Specimen: Blood   Result Value Ref Range    Magnesium 1.7 1.6 - 2.4 mg/dL   CBC Auto Differential    Specimen: Blood   Result Value Ref Range    WBC 6.20 3.40 - 10.80 10*3/mm3    RBC 3.51 (L) 4.14 - 5.80 10*6/mm3    Hemoglobin 10.1 (L) 13.0 - 17.7 g/dL    Hematocrit 32.2 (L) 37.5 - 51.0 %    MCV 91.7 79.0 - 97.0 fL    MCH 28.8 26.6 - 33.0 pg    MCHC 31.4 (L) 31.5 - 35.7 g/dL    RDW 14.5 12.3 - 15.4 %    RDW-SD 48.4 37.0 - 54.0 fl    MPV 12.1 (H) 6.0 - 12.0 fL    Platelets 129 (L) 140 - 450 10*3/mm3    Neutrophil % 53.1 42.7 - 76.0 %    Lymphocyte % 32.9 19.6 - 45.3 %    Monocyte % 7.9 5.0 - 12.0 %    Eosinophil % 5.3 0.3 - 6.2 %    Basophil % 0.6 0.0 - 1.5 %    Immature Grans % 0.2 0.0 - 0.5 %    Neutrophils, Absolute 3.29 1.70 - 7.00 10*3/mm3    Lymphocytes, Absolute 2.04 0.70 - 3.10 10*3/mm3    Monocytes, Absolute 0.49 0.10 - 0.90 10*3/mm3    Eosinophils, Absolute 0.33 0.00 - 0.40 10*3/mm3    Basophils, Absolute 0.04 0.00 - 0.20 10*3/mm3    Immature  Grans, Absolute 0.01 0.00 - 0.05 10*3/mm3    nRBC 0.0 0.0 - 0.2 /100 WBC   Phosphorus    Specimen: Blood   Result Value Ref Range    Phosphorus 4.0 2.5 - 4.5 mg/dL   Urinalysis, Microscopic Only - Urine, Clean Catch    Specimen: Urine, Clean Catch   Result Value Ref Range    RBC, UA None Seen None Seen, 0-2 /HPF    WBC, UA None Seen None Seen, 0-2 /HPF    Bacteria, UA None Seen None Seen /HPF    Squamous Epithelial Cells, UA 0-2 None Seen, 0-2 /HPF    Hyaline Casts, UA None Seen None Seen /LPF    Methodology Manual Light Microscopy             Procedures        Assessment and Plan    Diagnoses and all orders for this visit:    1. Hyperlipidemia, unspecified hyperlipidemia type (Primary)    2. Acquired hypothyroidism  -     TSH  -     T4, Free    3. Type 2 diabetes mellitus without complication, without long-term current use of insulin (HCC)  -     Hemoglobin A1c; Future    4. CKD (chronic kidney disease), stage IV ([facility])    5. Essential hypertension    6. Thrombocytopenia ([facility])    7. Monoclonal gammopathy of unknown significance (MGUS)    8. Anemia due to stage 4 chronic kidney disease (HCC)  -     Iron and TIBC; Future    Other orders  -     sertraline (ZOLOFT) 100 MG tablet; Take 1 tablet by mouth Daily.  Dispense: 90 tablet; Refill: 1           Chronic kidney disease        - The patient is doing well on torsemide 40 mg daily. He will continue to follow up with Dr. Castro.    Depression        - The patient will continue Zoloft 100 mg at bedtime.    Diabetes        - The patient will continue Lantus 25 units daily. We will order a hemoglobin A1c.    Health maintenance        - I advised the patient that if he would like a tetanus vaccination he can get that at a local pharmacy.         -  We will request the records from [facility] regarding shingles vaccination.       }          Follow Up   Return in about 3 months (around 3/5/2023).  Patient was given instructions and counseling regarding his condition or for health  maintenance advice. Please see specific information pulled into the AVS if appropriate.     Transcribed from ambient dictation for Janna Mo MD by Lia Alves.  12/05/22   16:02 EST    Patient or patient representative verbalized consent to the visit recording.

## 2022-12-05 NOTE — PROGRESS NOTES
The ABCs of the Annual Wellness Visit  Subsequent Medicare Wellness Visit    Chief Complaint   Patient presents with   • Hospital Follow Up Visit     Renal shut down at [facility] for 5 days dr redd in [place] for catheter insertion for dialysis pre op.  Thinks renal shut down was from too much protein       Subjective    History of Present Illness:  Nelson Wang is a 77 y.o. male who presents for a Subsequent Medicare Wellness Visit.    The following portions of the patient's history were reviewed and   updated as appropriate: allergies, current medications, past family history, past medical history, past social history, past surgical history and problem list.    Compared to one year ago, the patient feels his physical   health is the same.    Compared to one year ago, the patient feels his mental   health is the same.    Recent Hospitalizations:  He was admitted within the past 365 days at at [facility].       Current Medical Providers:  Patient Care Team:  Janna Mo MD as PCP - General (Internal Medicine)    Outpatient Medications Prior to Visit   Medication Sig Dispense Refill   • allopurinol (ZYLOPRIM) 100 MG tablet Take 300 mg by mouth Daily.     • ascorbic acid (VITAMIN C) 1000 MG tablet Take 1,000 mg by mouth.     • atorvastatin (Lipitor) 40 MG tablet Take 1 tablet by mouth Daily. 90 tablet 3   • calcium acetate (PHOS BINDER,) 667 MG capsule capsule Take 1 tablet by mouth 3 (Three) Times a Day.     • Calcium Carbonate-Vitamin D (calcium-vitamin D) 500-200 MG-UNIT tablet per tablet Take 1 tablet by mouth.     • carvedilol (COREG) 25 MG tablet Take 1 tablet by mouth 2 (Two) Times a Day With Meals. 90 tablet 1   • clobetasol (TEMOVATE) 0.05 % ointment      • Euthyrox 75 MCG tablet Take 1 tablet by mouth once daily 90 tablet 3   • Insulin Glargine (LANTUS SOLOSTAR) 100 UNIT/ML injection pen Inject 14 Units under the skin into the appropriate area as directed Daily. (Patient taking  "differently: Inject 25 Units under the skin into the appropriate area as directed Daily.) 3 mL 2   • Omega-3 Fatty Acids (fish oil) 1000 MG capsule capsule Take  by mouth.     • Risankizumab-rzaa (SKYRIZI, 150 MG DOSE, SC) Inject  under the skin into the appropriate area as directed. Once every 3 months     • Torsemide 40 MG tablet Take 40 mg by mouth 1 (One) Time.     • Turmeric 500 MG capsule Take  by mouth.     • sertraline (ZOLOFT) 100 MG tablet Take 100 mg by mouth Daily.       No facility-administered medications prior to visit.       No opioid medication identified on active medication list. I have reviewed chart for other potential  high risk medication/s and harmful drug interactions in the elderly.          Aspirin is not on active medication list.  Aspirin use is not indicated based on review of current medical condition/s. Risk of harm outweighs potential benefits.  .    Patient Active Problem List   Diagnosis   • Essential hypertension   • Bradycardia, sinus   • Anemia due to chronic kidney disease   • Hypothyroidism   • Idiopathic gout   • Proteinuria   • Psoriasis   • Thrombocytopenia (HCC)   • Type 2 diabetes mellitus without complication (HCC)   • CKD (chronic kidney disease), stage IV (HCC)   • Hyperlipidemia   • Monoclonal gammopathy of unknown significance (MGUS)     Advance Care Planning  Advance Directive is not on file.  ACP discussion was held with the patient during this visit. daughter would be his decision maker          Objective    Vitals:    12/05/22 1135   BP: 138/56   BP Location: Left arm   Patient Position: Sitting   Cuff Size: Adult   Pulse: 67   Temp: 98 °F (36.7 °C)   TempSrc: Infrared   SpO2: 99%   Weight: 71.2 kg (157 lb)   Height: 167.6 cm (66\")     Estimated body mass index is 25.34 kg/m² as calculated from the following:    Height as of this encounter: 167.6 cm (66\").    Weight as of this encounter: 71.2 kg (157 lb).    BMI is >= 25 and <30. (Overweight) The following " options were offered after discussion;: nutrition counseling/recommendations      Does the patient have evidence of cognitive impairment? No    Physical Exam  Lab Results   Component Value Date    TRIG 141 2022    HDL 43 2022    LDL 60 2022    VLDL 25 2022            HEALTH RISK ASSESSMENT    Smoking Status:  Social History     Tobacco Use   Smoking Status Former   • Packs/day: 1.00   • Years: 10.00   • Pack years: 10.00   • Types: Cigarettes   • Start date:    • Quit date:    • Years since quittin.9   Smokeless Tobacco Never   Tobacco Comments    quit 25 years ago, chews nicotine gum     Alcohol Consumption:  Social History     Substance and Sexual Activity   Alcohol Use Yes    Comment: 1 DRINK/DAY, 12 oz weekly     Fall Risk Screen:    JENNIFER Fall Risk Assessment has not been completed.    Depression Screening:  PHQ-2/PHQ-9 Depression Screening 3/18/2022   Retired PHQ-9 Total Score -   Retired Total Score -   Little Interest or Pleasure in Doing Things 0-->not at all   Feeling Down, Depressed or Hopeless 0-->not at all   PHQ-9: Brief Depression Severity Measure Score 0       Health Habits and Functional and Cognitive Screening:  Functional & Cognitive Status 11/10/2021   Do you have difficulty preparing food and eating? No   Do you have difficulty bathing yourself, getting dressed or grooming yourself? No   Do you have difficulty using the toilet? No   Do you have difficulty moving around from place to place? No   Current Diet Well Balanced Diet   Dental Exam Up to date   Eye Exam Up to date   Exercise (times per week) 0 times per week   Current Exercises Include No Regular Exercise   Do you need help using the phone?  No   Are you deaf or do you have serious difficulty hearing?  No   Do you need help with transportation? No   Do you need help shopping? No   Do you need help preparing meals?  No   Do you need help with housework?  No   Do you need help with laundry? No   Do you  need help taking your medications? No   Do you need help managing money? No   Do you ever drive or ride in a car without wearing a seat belt? No   Have you felt unusual stress, anger or loneliness in the last month? No   Who do you live with? Spouse   If you need help, do you have trouble finding someone available to you? No   Have you been bothered in the last four weeks by sexual problems? No   Do you have difficulty concentrating, remembering or making decisions? No       Age-appropriate Screening Schedule:  Refer to the list below for future screening recommendations based on patient's age, sex and/or medical conditions. Orders for these recommended tests are listed in the plan section. The patient has been provided with a written plan.    Health Maintenance   Topic Date Due   • TDAP/TD VACCINES (1 - Tdap) Never done   • ZOSTER VACCINE (1 of 2) Never done   • DIABETIC EYE EXAM  11/04/2021   • HEMOGLOBIN A1C  10/11/2022   • LIPID PANEL  11/29/2023   • URINE MICROALBUMIN  11/29/2023   • INFLUENZA VACCINE  Completed              Assessment & Plan   CMS Preventative Services Quick Reference  Risk Factors Identified During Encounter  Depression/Dysphoria  Immunizations Discussed/Encouraged (specific Immunizations; Tdap and Shingrix  The above risks/problems have been discussed with the patient.  Follow up actions/plans if indicated are seen below in the Assessment/Plan Section.  Pertinent information has been shared with the patient in the After Visit Summary.    Diagnoses and all orders for this visit:    1. Hyperlipidemia, unspecified hyperlipidemia type (Primary)    2. Acquired hypothyroidism  -     TSH  -     T4, Free    3. Type 2 diabetes mellitus without complication, without long-term current use of insulin (HCC)  -     Hemoglobin A1c; Future    4. CKD (chronic kidney disease), stage IV (HCC)    5. Essential hypertension    6. Thrombocytopenia (HCC)    7. Monoclonal gammopathy of unknown significance  (MGUS)    8. Anemia due to stage 4 chronic kidney disease (HCC)  -     Iron and TIBC; Future    Other orders  -     sertraline (ZOLOFT) 100 MG tablet; Take 1 tablet by mouth Daily.  Dispense: 90 tablet; Refill: 1        Follow Up:   Return in about 3 months (around 3/5/2023).     An After Visit Summary and PPPS were made available to the patient.

## 2022-12-30 ENCOUNTER — LAB (OUTPATIENT)
Dept: LAB | Facility: HOSPITAL | Age: 77
End: 2022-12-30
Payer: MEDICARE

## 2022-12-30 DIAGNOSIS — E11.9 TYPE 2 DIABETES MELLITUS WITHOUT COMPLICATION, WITHOUT LONG-TERM CURRENT USE OF INSULIN: ICD-10-CM

## 2022-12-30 DIAGNOSIS — D63.1 ANEMIA DUE TO STAGE 4 CHRONIC KIDNEY DISEASE: ICD-10-CM

## 2022-12-30 DIAGNOSIS — N18.4 ANEMIA DUE TO STAGE 4 CHRONIC KIDNEY DISEASE: ICD-10-CM

## 2022-12-30 LAB
HBA1C MFR BLD: 6.3 % (ref 4.8–5.6)
IRON 24H UR-MRATE: 141 MCG/DL (ref 59–158)
IRON SATN MFR SERPL: 41 % (ref 20–50)
T4 FREE SERPL-MCNC: 0.92 NG/DL (ref 0.93–1.7)
TIBC SERPL-MCNC: 343 MCG/DL (ref 298–536)
TRANSFERRIN SERPL-MCNC: 230 MG/DL (ref 200–360)
TSH SERPL DL<=0.05 MIU/L-ACNC: 16.9 UIU/ML (ref 0.27–4.2)

## 2022-12-30 PROCEDURE — 84466 ASSAY OF TRANSFERRIN: CPT

## 2022-12-30 PROCEDURE — 84443 ASSAY THYROID STIM HORMONE: CPT | Performed by: INTERNAL MEDICINE

## 2022-12-30 PROCEDURE — 83540 ASSAY OF IRON: CPT

## 2022-12-30 PROCEDURE — 84439 ASSAY OF FREE THYROXINE: CPT | Performed by: INTERNAL MEDICINE

## 2022-12-30 PROCEDURE — 83036 HEMOGLOBIN GLYCOSYLATED A1C: CPT

## 2022-12-30 PROCEDURE — 36415 COLL VENOUS BLD VENIPUNCTURE: CPT

## 2023-01-02 RX ORDER — LEVOTHYROXINE SODIUM 88 UG/1
88 TABLET ORAL DAILY
Qty: 90 TABLET | Refills: 1 | Status: SHIPPED | OUTPATIENT
Start: 2023-01-02 | End: 2023-03-22 | Stop reason: SDUPTHER

## 2023-01-05 ENCOUNTER — TRANSCRIBE ORDERS (OUTPATIENT)
Dept: LAB | Facility: HOSPITAL | Age: 78
End: 2023-01-05
Payer: MEDICARE

## 2023-01-05 ENCOUNTER — LAB (OUTPATIENT)
Dept: LAB | Facility: HOSPITAL | Age: 78
End: 2023-01-05
Payer: MEDICARE

## 2023-01-05 DIAGNOSIS — N18.5 CHRONIC KIDNEY DISEASE, STAGE V: ICD-10-CM

## 2023-01-05 DIAGNOSIS — N18.5 CHRONIC KIDNEY DISEASE, STAGE V: Primary | ICD-10-CM

## 2023-01-05 LAB
ALBUMIN SERPL-MCNC: 4.3 G/DL (ref 3.5–5.2)
ANION GAP SERPL CALCULATED.3IONS-SCNC: 11 MMOL/L (ref 5–15)
BASOPHILS # BLD AUTO: 0.04 10*3/MM3 (ref 0–0.2)
BASOPHILS NFR BLD AUTO: 0.6 % (ref 0–1.5)
BUN SERPL-MCNC: 72 MG/DL (ref 8–23)
BUN/CREAT SERPL: 13.7 (ref 7–25)
CALCIUM SPEC-SCNC: 9.6 MG/DL (ref 8.6–10.5)
CHLORIDE SERPL-SCNC: 105 MMOL/L (ref 98–107)
CO2 SERPL-SCNC: 25 MMOL/L (ref 22–29)
CREAT SERPL-MCNC: 5.26 MG/DL (ref 0.76–1.27)
DEPRECATED RDW RBC AUTO: 46 FL (ref 37–54)
EGFRCR SERPLBLD CKD-EPI 2021: 10.6 ML/MIN/1.73
EOSINOPHIL # BLD AUTO: 0.25 10*3/MM3 (ref 0–0.4)
EOSINOPHIL NFR BLD AUTO: 3.6 % (ref 0.3–6.2)
ERYTHROCYTE [DISTWIDTH] IN BLOOD BY AUTOMATED COUNT: 13.9 % (ref 12.3–15.4)
GLUCOSE SERPL-MCNC: 104 MG/DL (ref 65–99)
HCT VFR BLD AUTO: 29.4 % (ref 37.5–51)
HGB BLD-MCNC: 9.5 G/DL (ref 13–17.7)
IMM GRANULOCYTES # BLD AUTO: 0.02 10*3/MM3 (ref 0–0.05)
IMM GRANULOCYTES NFR BLD AUTO: 0.3 % (ref 0–0.5)
LYMPHOCYTES # BLD AUTO: 2.71 10*3/MM3 (ref 0.7–3.1)
LYMPHOCYTES NFR BLD AUTO: 38.8 % (ref 19.6–45.3)
MCH RBC QN AUTO: 30.2 PG (ref 26.6–33)
MCHC RBC AUTO-ENTMCNC: 32.3 G/DL (ref 31.5–35.7)
MCV RBC AUTO: 93.3 FL (ref 79–97)
MONOCYTES # BLD AUTO: 0.46 10*3/MM3 (ref 0.1–0.9)
MONOCYTES NFR BLD AUTO: 6.6 % (ref 5–12)
NEUTROPHILS NFR BLD AUTO: 3.5 10*3/MM3 (ref 1.7–7)
NEUTROPHILS NFR BLD AUTO: 50.1 % (ref 42.7–76)
NRBC BLD AUTO-RTO: 0 /100 WBC (ref 0–0.2)
PHOSPHATE SERPL-MCNC: 6.1 MG/DL (ref 2.5–4.5)
PLATELET # BLD AUTO: 111 10*3/MM3 (ref 140–450)
PMV BLD AUTO: 12.7 FL (ref 6–12)
POTASSIUM SERPL-SCNC: 4.8 MMOL/L (ref 3.5–5.2)
RBC # BLD AUTO: 3.15 10*6/MM3 (ref 4.14–5.8)
SODIUM SERPL-SCNC: 141 MMOL/L (ref 136–145)
WBC NRBC COR # BLD: 6.98 10*3/MM3 (ref 3.4–10.8)

## 2023-01-05 PROCEDURE — 85025 COMPLETE CBC W/AUTO DIFF WBC: CPT

## 2023-01-05 PROCEDURE — 80069 RENAL FUNCTION PANEL: CPT

## 2023-01-05 PROCEDURE — 36415 COLL VENOUS BLD VENIPUNCTURE: CPT

## 2023-01-25 ENCOUNTER — TRANSCRIBE ORDERS (OUTPATIENT)
Dept: ADMINISTRATIVE | Facility: HOSPITAL | Age: 78
End: 2023-01-25
Payer: MEDICARE

## 2023-01-25 DIAGNOSIS — D63.1 ANEMIA IN END-STAGE RENAL DISEASE: ICD-10-CM

## 2023-01-25 DIAGNOSIS — N18.6 ANEMIA IN END-STAGE RENAL DISEASE: ICD-10-CM

## 2023-01-25 DIAGNOSIS — N18.5 CHRONIC KIDNEY DISEASE, STAGE V: Primary | ICD-10-CM

## 2023-01-26 PROBLEM — N18.5 STAGE 5 CHRONIC KIDNEY DISEASE: Status: ACTIVE | Noted: 2023-01-26

## 2023-03-01 ENCOUNTER — TRANSCRIBE ORDERS (OUTPATIENT)
Dept: ADMINISTRATIVE | Facility: HOSPITAL | Age: 78
End: 2023-03-01
Payer: MEDICARE

## 2023-03-01 DIAGNOSIS — D63.1 ANEMIA IN END-STAGE RENAL DISEASE: ICD-10-CM

## 2023-03-01 DIAGNOSIS — N18.5 CHRONIC KIDNEY DISEASE, STAGE V: Primary | ICD-10-CM

## 2023-03-01 DIAGNOSIS — N18.6 ANEMIA IN END-STAGE RENAL DISEASE: ICD-10-CM

## 2023-03-07 ENCOUNTER — HOSPITAL ENCOUNTER (OUTPATIENT)
Dept: INFUSION THERAPY | Facility: HOSPITAL | Age: 78
Discharge: HOME | End: 2023-03-07
Payer: MEDICARE

## 2023-03-07 ENCOUNTER — LAB (OUTPATIENT)
Dept: LAB | Facility: HOSPITAL | Age: 78
End: 2023-03-07
Payer: MEDICARE

## 2023-03-07 ENCOUNTER — TRANSCRIBE ORDERS (OUTPATIENT)
Dept: LAB | Facility: HOSPITAL | Age: 78
End: 2023-03-07
Payer: MEDICARE

## 2023-03-07 VITALS
WEIGHT: 154.1 LBS | HEART RATE: 83 BPM | TEMPERATURE: 98.1 F | DIASTOLIC BLOOD PRESSURE: 74 MMHG | HEIGHT: 66 IN | RESPIRATION RATE: 20 BRPM | BODY MASS INDEX: 24.77 KG/M2 | OXYGEN SATURATION: 97 % | SYSTOLIC BLOOD PRESSURE: 182 MMHG

## 2023-03-07 DIAGNOSIS — D63.1 ANEMIA DUE TO STAGE 5 CHRONIC KIDNEY DISEASE, NOT ON CHRONIC DIALYSIS: ICD-10-CM

## 2023-03-07 DIAGNOSIS — N18.5 ANEMIA DUE TO STAGE 5 CHRONIC KIDNEY DISEASE, NOT ON CHRONIC DIALYSIS: ICD-10-CM

## 2023-03-07 DIAGNOSIS — N18.5 STAGE 5 CHRONIC KIDNEY DISEASE: Primary | ICD-10-CM

## 2023-03-07 DIAGNOSIS — N18.5 STAGE 5 CHRONIC KIDNEY DISEASE: ICD-10-CM

## 2023-03-07 DIAGNOSIS — N18.6 ANEMIA IN END-STAGE RENAL DISEASE: ICD-10-CM

## 2023-03-07 DIAGNOSIS — N18.5 CHRONIC KIDNEY DISEASE, STAGE V: ICD-10-CM

## 2023-03-07 DIAGNOSIS — N18.5 CHRONIC KIDNEY DISEASE, STAGE V: Primary | ICD-10-CM

## 2023-03-07 DIAGNOSIS — D63.1 ANEMIA IN END-STAGE RENAL DISEASE: ICD-10-CM

## 2023-03-07 LAB
25(OH)D3 SERPL-MCNC: 54.4 NG/ML (ref 30–100)
ALBUMIN SERPL-MCNC: 4.2 G/DL (ref 3.5–5.2)
ANION GAP SERPL CALCULATED.3IONS-SCNC: 11.9 MMOL/L (ref 5–15)
BACTERIA UR QL AUTO: NORMAL /HPF
BASOPHILS # BLD AUTO: 0.03 10*3/MM3 (ref 0–0.2)
BASOPHILS NFR BLD AUTO: 0.5 % (ref 0–1.5)
BILIRUB UR QL STRIP: NEGATIVE
BUN SERPL-MCNC: 60 MG/DL (ref 8–23)
BUN/CREAT SERPL: 11.3 (ref 7–25)
CALCIUM SPEC-SCNC: 9.9 MG/DL (ref 8.6–10.5)
CHLORIDE SERPL-SCNC: 107 MMOL/L (ref 98–107)
CLARITY UR: CLEAR
CO2 SERPL-SCNC: 25.1 MMOL/L (ref 22–29)
COLOR UR: YELLOW
CREAT SERPL-MCNC: 5.3 MG/DL (ref 0.76–1.27)
CREAT UR-MCNC: 33.1 MG/DL
DEPRECATED RDW RBC AUTO: 45.9 FL (ref 37–54)
EGFRCR SERPLBLD CKD-EPI 2021: 10.5 ML/MIN/1.73
EOSINOPHIL # BLD AUTO: 0.23 10*3/MM3 (ref 0–0.4)
EOSINOPHIL NFR BLD AUTO: 3.6 % (ref 0.3–6.2)
ERYTHROCYTE [DISTWIDTH] IN BLOOD BY AUTOMATED COUNT: 14.2 % (ref 12.3–15.4)
FERRITIN SERPL-MCNC: 659.7 NG/ML (ref 30–400)
GLUCOSE SERPL-MCNC: 91 MG/DL (ref 65–99)
GLUCOSE UR STRIP-MCNC: NEGATIVE MG/DL
HCT VFR BLD AUTO: 31 % (ref 37.5–51)
HCT VFR BLD AUTO: 31.1 % (ref 37.5–51)
HGB BLD-MCNC: 10.3 G/DL (ref 13–17.7)
HGB BLD-MCNC: 9.7 G/DL (ref 13–17.7)
HGB UR QL STRIP.AUTO: NEGATIVE
HYALINE CASTS UR QL AUTO: NORMAL /LPF
IMM GRANULOCYTES # BLD AUTO: 0.01 10*3/MM3 (ref 0–0.05)
IMM GRANULOCYTES NFR BLD AUTO: 0.2 % (ref 0–0.5)
IRON 24H UR-MRATE: 123 MCG/DL (ref 59–158)
IRON SATN MFR SERPL: 36 % (ref 20–50)
KETONES UR QL STRIP: NEGATIVE
LEUKOCYTE ESTERASE UR QL STRIP.AUTO: NEGATIVE
LYMPHOCYTES # BLD AUTO: 2.2 10*3/MM3 (ref 0.7–3.1)
LYMPHOCYTES NFR BLD AUTO: 34.9 % (ref 19.6–45.3)
MCH RBC QN AUTO: 29.9 PG (ref 26.6–33)
MCHC RBC AUTO-ENTMCNC: 33.1 G/DL (ref 31.5–35.7)
MCV RBC AUTO: 90.4 FL (ref 79–97)
MONOCYTES # BLD AUTO: 0.48 10*3/MM3 (ref 0.1–0.9)
MONOCYTES NFR BLD AUTO: 7.6 % (ref 5–12)
NEUTROPHILS NFR BLD AUTO: 3.36 10*3/MM3 (ref 1.7–7)
NEUTROPHILS NFR BLD AUTO: 53.2 % (ref 42.7–76)
NITRITE UR QL STRIP: NEGATIVE
NRBC BLD AUTO-RTO: 0 /100 WBC (ref 0–0.2)
PH UR STRIP.AUTO: 6.5 [PH] (ref 5–8)
PHOSPHATE SERPL-MCNC: 4.9 MG/DL (ref 2.5–4.5)
PLATELET # BLD AUTO: 117 10*3/MM3 (ref 140–450)
PMV BLD AUTO: 12.6 FL (ref 6–12)
POTASSIUM SERPL-SCNC: 4.3 MMOL/L (ref 3.5–5.2)
PROT ?TM UR-MCNC: 132 MG/DL
PROT UR QL STRIP: ABNORMAL
PROT/CREAT UR: 3.99 MG/G{CREAT}
PTH-INTACT SERPL-MCNC: 102 PG/ML (ref 15–65)
RBC # BLD AUTO: 3.44 10*6/MM3 (ref 4.14–5.8)
RBC # UR STRIP: NORMAL /HPF
REF LAB TEST METHOD: NORMAL
SODIUM SERPL-SCNC: 144 MMOL/L (ref 136–145)
SP GR UR STRIP: 1.01 (ref 1–1.03)
SQUAMOUS #/AREA URNS HPF: NORMAL /HPF
TIBC SERPL-MCNC: 343 MCG/DL (ref 298–536)
TRANSFERRIN SERPL-MCNC: 230 MG/DL (ref 200–360)
UROBILINOGEN UR QL STRIP: ABNORMAL
WBC # UR STRIP: NORMAL /HPF
WBC NRBC COR # BLD: 6.31 10*3/MM3 (ref 3.4–10.8)

## 2023-03-07 PROCEDURE — 83970 ASSAY OF PARATHORMONE: CPT

## 2023-03-07 PROCEDURE — 84466 ASSAY OF TRANSFERRIN: CPT

## 2023-03-07 PROCEDURE — 84156 ASSAY OF PROTEIN URINE: CPT

## 2023-03-07 PROCEDURE — 36415 COLL VENOUS BLD VENIPUNCTURE: CPT

## 2023-03-07 PROCEDURE — 86803 HEPATITIS C AB TEST: CPT | Performed by: INTERNAL MEDICINE

## 2023-03-07 PROCEDURE — 81001 URINALYSIS AUTO W/SCOPE: CPT

## 2023-03-07 PROCEDURE — 96372 THER/PROPH/DIAG INJ SC/IM: CPT

## 2023-03-07 PROCEDURE — 82306 VITAMIN D 25 HYDROXY: CPT

## 2023-03-07 PROCEDURE — 85018 HEMOGLOBIN: CPT | Performed by: INTERNAL MEDICINE

## 2023-03-07 PROCEDURE — 82570 ASSAY OF URINE CREATININE: CPT

## 2023-03-07 PROCEDURE — 82728 ASSAY OF FERRITIN: CPT

## 2023-03-07 PROCEDURE — 25010000002 DARBEPOETIN ALFA 40 MCG/0.4ML SOLUTION PREFILLED SYRINGE: Performed by: INTERNAL MEDICINE

## 2023-03-07 PROCEDURE — 85025 COMPLETE CBC W/AUTO DIFF WBC: CPT

## 2023-03-07 PROCEDURE — 83540 ASSAY OF IRON: CPT

## 2023-03-07 PROCEDURE — 85014 HEMATOCRIT: CPT | Performed by: INTERNAL MEDICINE

## 2023-03-07 PROCEDURE — 80069 RENAL FUNCTION PANEL: CPT

## 2023-03-07 PROCEDURE — 83036 HEMOGLOBIN GLYCOSYLATED A1C: CPT | Performed by: INTERNAL MEDICINE

## 2023-03-07 RX ORDER — FUROSEMIDE 20 MG/1
1 TABLET ORAL EVERY 12 HOURS SCHEDULED
COMMUNITY
Start: 2022-11-29 | End: 2023-03-22

## 2023-03-07 RX ORDER — ALLOPURINOL 300 MG/1
300 TABLET ORAL NIGHTLY
COMMUNITY
Start: 2023-01-05

## 2023-03-07 RX ADMIN — DARBEPOETIN ALFA 40 MCG: 40 INJECTION, SOLUTION INTRAVENOUS; SUBCUTANEOUS at 16:43

## 2023-03-08 ENCOUNTER — OFFICE VISIT (OUTPATIENT)
Dept: INTERNAL MEDICINE | Facility: CLINIC | Age: 78
End: 2023-03-08
Payer: MEDICARE

## 2023-03-08 VITALS
SYSTOLIC BLOOD PRESSURE: 118 MMHG | OXYGEN SATURATION: 99 % | BODY MASS INDEX: 24.65 KG/M2 | HEART RATE: 77 BPM | DIASTOLIC BLOOD PRESSURE: 58 MMHG | TEMPERATURE: 99.8 F | WEIGHT: 153.4 LBS | HEIGHT: 66 IN

## 2023-03-08 DIAGNOSIS — E78.5 HYPERLIPIDEMIA, UNSPECIFIED HYPERLIPIDEMIA TYPE: ICD-10-CM

## 2023-03-08 DIAGNOSIS — Z13.9 SCREENING DUE: ICD-10-CM

## 2023-03-08 DIAGNOSIS — D47.2 MONOCLONAL GAMMOPATHY OF UNKNOWN SIGNIFICANCE (MGUS): ICD-10-CM

## 2023-03-08 DIAGNOSIS — N18.5 STAGE 5 CHRONIC KIDNEY DISEASE: ICD-10-CM

## 2023-03-08 DIAGNOSIS — E11.9 TYPE 2 DIABETES MELLITUS WITHOUT COMPLICATION, WITHOUT LONG-TERM CURRENT USE OF INSULIN: Primary | ICD-10-CM

## 2023-03-08 DIAGNOSIS — I10 ESSENTIAL HYPERTENSION: ICD-10-CM

## 2023-03-08 LAB
HBA1C MFR BLD: 6.4 % (ref 4.8–5.6)
HCV AB SER DONR QL: NORMAL

## 2023-03-08 PROCEDURE — 99214 OFFICE O/P EST MOD 30 MIN: CPT | Performed by: INTERNAL MEDICINE

## 2023-03-08 NOTE — PROGRESS NOTES
"Chief Complaint  Hypertension, Diabetes (3 month follow up ), and Chronic Kidney Disease (Patient in process of getting Dialysis set up. )    Subjective          Nelson Wang presents to [facility]  History of Present Illness     States that he took an epo shot yesterday due to anemia  Has not really noticed if it is doing anything yet    He sees Dr. Castro tomorrow    Planning to get his peritoneal dialysis catheter placed next week    Has noticed that food is tasting better    Mental health is doing better and he is still doing zoloft    No chest pain  Breathing well    No fluid retention    Teresa is working well for his psoriasis    Objective   Vital Signs:   /58   Pulse 77   Temp 99.8 °F (37.7 °C) (Temporal)   Ht 167.6 cm (66\")   Wt 69.6 kg (153 lb 6.4 oz)   SpO2 99%   BMI 24.76 kg/m²     Physical Exam  Vitals reviewed.   Constitutional:       Appearance: Normal appearance. He is well-developed.   HENT:      Head: Normocephalic and atraumatic.      Right Ear: External ear normal.      Left Ear: External ear normal.   Eyes:      Conjunctiva/sclera: Conjunctivae normal.      Pupils: Pupils are equal, round, and reactive to light.   Cardiovascular:      Rate and Rhythm: Normal rate and regular rhythm.      Heart sounds: No murmur heard.    No friction rub. No gallop.   Pulmonary:      Effort: Pulmonary effort is normal.      Breath sounds: Normal breath sounds. No wheezing or rhonchi.   Skin:     General: Skin is warm and dry.   Neurological:      Mental Status: He is alert and oriented to person, place, and time.   Psychiatric:         Mood and Affect: Affect normal.         Behavior: Behavior normal.         Thought Content: Thought content normal.        Result Review :       Common labs    Common Labs 12/30/22 1/5/23 1/5/23 3/7/23 3/7/23 3/7/23     1457 1457 1107 1107 1615   Glucose   104 (A)  91    BUN   72 (A)  60 (A)    Creatinine   5.26 (A)  " 5.30 (A)    Sodium   141  144    Potassium   4.8  4.3    Chloride   105  107    Calcium   9.6  9.9    Albumin   4.3  4.2    WBC  6.98  6.31     Hemoglobin  9.5 (A)  10.3 (A)  9.7 (A)   Hematocrit  29.4 (A)  31.1 (A)  31.0 (A)   Platelets  111 (A)  117 (A)     Hemoglobin A1C 6.30 (A)        (A) Abnormal value              Results for orders placed or performed during the hospital encounter of 03/07/23   Hemoglobin & Hematocrit, Blood    Specimen: Blood   Result Value Ref Range    Hemoglobin 9.7 (L) 13.0 - 17.7 g/dL    Hematocrit 31.0 (L) 37.5 - 51.0 %            Procedures        Assessment and Plan    Diagnoses and all orders for this visit:    1. Type 2 diabetes mellitus without complication, without long-term current use of insulin (HCC) (Primary)  Comments:  We will check A1c today has done well  Orders:  -     Hemoglobin A1c    2. Screening due  -     Hepatitis C Antibody    3. Stage 5 chronic kidney disease (HCC)  Comments:  Starting dialysis with Dr. Castro continue to work with him    4. Monoclonal gammopathy of unknown significance (MGUS)  Comments:  Stable    5. Essential hypertension  Comments:  Very well controlled continue to monitor    6. Hyperlipidemia, unspecified hyperlipidemia type  Comments:  Doing great continue to monitor        {BMI is within normal parameters. No other follow-up for BMI required.            Follow Up   Return in about 6 months (around 9/8/2023).  Patient was given instructions and counseling regarding his condition or for health maintenance advice. Please see specific information pulled into the AVS if appropriate.

## 2023-03-13 ENCOUNTER — TELEPHONE (OUTPATIENT)
Dept: SURGERY | Facility: CLINIC | Age: 78
End: 2023-03-13
Payer: MEDICARE

## 2023-03-13 ENCOUNTER — PREP FOR SURGERY (OUTPATIENT)
Dept: OTHER | Facility: HOSPITAL | Age: 78
End: 2023-03-13
Payer: MEDICARE

## 2023-03-13 DIAGNOSIS — N18.5 CKD (CHRONIC KIDNEY DISEASE) STAGE 5, GFR LESS THAN 15 ML/MIN: Primary | ICD-10-CM

## 2023-03-13 RX ORDER — CEFAZOLIN SODIUM 2 G/100ML
2 INJECTION, SOLUTION INTRAVENOUS ONCE
Status: CANCELLED | OUTPATIENT
Start: 2023-03-27 | End: 2023-03-13

## 2023-03-13 NOTE — TELEPHONE ENCOUNTER
Lena from Dr. Castro's office called and would like to get patient set up to have PD Cath placed. Can orders be placed?

## 2023-03-14 ENCOUNTER — TELEPHONE (OUTPATIENT)
Dept: SURGERY | Facility: CLINIC | Age: 78
End: 2023-03-14
Payer: MEDICARE

## 2023-03-17 ENCOUNTER — TELEPHONE (OUTPATIENT)
Dept: SURGERY | Facility: CLINIC | Age: 78
End: 2023-03-17
Payer: MEDICARE

## 2023-03-22 ENCOUNTER — OFFICE VISIT (OUTPATIENT)
Dept: CARDIOLOGY | Facility: CLINIC | Age: 78
End: 2023-03-22
Payer: MEDICARE

## 2023-03-22 VITALS
SYSTOLIC BLOOD PRESSURE: 129 MMHG | BODY MASS INDEX: 24.75 KG/M2 | DIASTOLIC BLOOD PRESSURE: 78 MMHG | HEART RATE: 67 BPM | WEIGHT: 154 LBS | HEIGHT: 66 IN

## 2023-03-22 DIAGNOSIS — I10 ESSENTIAL HYPERTENSION: Primary | ICD-10-CM

## 2023-03-22 DIAGNOSIS — N18.5 CKD (CHRONIC KIDNEY DISEASE) STAGE 5, GFR LESS THAN 15 ML/MIN: ICD-10-CM

## 2023-03-22 DIAGNOSIS — E78.2 MIXED DYSLIPIDEMIA: ICD-10-CM

## 2023-03-22 PROCEDURE — 99214 OFFICE O/P EST MOD 30 MIN: CPT | Performed by: INTERNAL MEDICINE

## 2023-03-22 PROCEDURE — 93000 ELECTROCARDIOGRAM COMPLETE: CPT | Performed by: INTERNAL MEDICINE

## 2023-03-22 PROCEDURE — 3074F SYST BP LT 130 MM HG: CPT | Performed by: INTERNAL MEDICINE

## 2023-03-22 PROCEDURE — 3078F DIAST BP <80 MM HG: CPT | Performed by: INTERNAL MEDICINE

## 2023-03-22 RX ORDER — CARVEDILOL 12.5 MG/1
12.5 TABLET ORAL 2 TIMES DAILY
Qty: 180 TABLET | Refills: 3 | Status: SHIPPED | OUTPATIENT
Start: 2023-03-22

## 2023-03-22 NOTE — PROGRESS NOTES
Chief Complaint  Hypertension and Hyperlipidemia    Subjective      This is a very pleasant 78-year-old gentleman with past medical history as outlined below, remarkable for hypertension, dyslipidemia, diabetes and chronic kidney disease stage V, presents to clinic for routine follow-up visit.  Overall, he has been doing well.  He has no cardiac complaints today.  He has no chest discomfort or dyspnea.  He has no palpitations, presyncope or syncope.  He was having orthostatic hypotension which resolved after he reduced carvedilol dose by half.    Past Medical History:   Diagnosis Date   • Anemia    • CKD (chronic kidney disease), stage IV (HCC)    • Diabetes (HCC)    • Gout    • High cholesterol    • HL (hearing loss)    • Hyperkalemia    • Hypertension    • Hypothyroidism    • Psoriasis    • Vitiligo          Current Outpatient Medications:   •  allopurinol (ZYLOPRIM) 300 MG tablet, Take 1 tablet by mouth Daily., Disp: , Rfl:   •  ascorbic acid (VITAMIN C) 1000 MG tablet, Take 1 tablet by mouth., Disp: , Rfl:   •  atorvastatin (Lipitor) 40 MG tablet, Take 1 tablet by mouth Daily., Disp: 90 tablet, Rfl: 3  •  calcium acetate (PHOS BINDER,) 667 MG capsule capsule, Take 1 tablet by mouth 3 (Three) Times a Day., Disp: , Rfl:   •  clobetasol (TEMOVATE) 0.05 % ointment, , Disp: , Rfl:   •  Euthyrox 75 MCG tablet, Take 1 tablet by mouth once daily (Patient taking differently: 2 (Two) Times a Day.), Disp: 90 tablet, Rfl: 3  •  Insulin Glargine (LANTUS SOLOSTAR) 100 UNIT/ML injection pen, Inject 14 Units under the skin into the appropriate area as directed Daily. (Patient taking differently: Inject 25 Units under the skin into the appropriate area as directed Daily.), Disp: 3 mL, Rfl: 2  •  Omega-3 Fatty Acids (fish oil) 1000 MG capsule capsule, Take  by mouth Daily With Breakfast., Disp: , Rfl:   •  Risankizumab-rzaa (SKYRIZI, 150 MG DOSE, SC), Inject  under the skin into the appropriate area as directed. Once every 3  "months, Disp: , Rfl:   •  sertraline (ZOLOFT) 100 MG tablet, Take 1 tablet by mouth Daily., Disp: 90 tablet, Rfl: 1  •  Torsemide 40 MG tablet, Take 40 mg by mouth 1 (One) Time., Disp: , Rfl:   •  Turmeric 500 MG capsule, Take  by mouth 2 (Two) Times a Day., Disp: , Rfl:   •  carvedilol (COREG) 12.5 MG tablet, Take 1 tablet by mouth 2 (Two) Times a Day., Disp: 180 tablet, Rfl: 3    Medications Discontinued During This Encounter   Medication Reason   • furosemide (LASIX) 20 MG tablet *Therapy completed   • levothyroxine (Euthyrox) 88 MCG tablet Duplicate order   • carvedilol (COREG) 25 MG tablet      No Known Allergies     Social History     Tobacco Use   • Smoking status: Former     Packs/day: 1.00     Years: 10.00     Pack years: 10.00     Types: Cigarettes     Start date:      Quit date:      Years since quittin.     Passive exposure: Past   • Smokeless tobacco: Never   • Tobacco comments:     quit 25 years ago, chews nicotine gum   Vaping Use   • Vaping Use: Never used   Substance Use Topics   • Alcohol use: Yes     Comment: 1 DRINK/DAY, 12 oz weekly   • Drug use: Never       Family History   Problem Relation Age of Onset   • Cancer Sister 35   • Diabetes Sister    • Diabetes Mother    • Heart disease Father    • Diabetes Brother    • Heart disease Paternal Uncle         Objective     /78   Pulse 67   Ht 167.6 cm (66\")   Wt 69.9 kg (154 lb)   BMI 24.86 kg/m²       Physical Exam    General Appearance:   · no acute distress  · Alert and oriented x3  HENT:   · lips not cyanotic  · Atraumatic  Neck:  · No jvd   · supple  Respiratory:  · no respiratory distress  · normal breath sounds  · no rales  Cardiovascular:  · no S3, no S4   · no murmur  · no rub  Extremities  · Edema: None    Skin:   · warm, dry  · No rashes      Result Review :     proBNP   Date Value Ref Range Status   07/10/2022 1,470.0 0.0 - 1,800.0 pg/mL Final     CMP    CMP 11/29/22 1/5/23 3/7/23   Glucose 214 (A) 104 (A) 91   BUN " 58 (A) 72 (A) 60 (A)   Creatinine 5.11 (A) 5.26 (A) 5.30 (A)   eGFR 11.0 (A) 10.6 (A) 10.5 (A)   Sodium 141 141 144   Potassium 4.6 4.8 4.3   Chloride 104 105 107   Calcium 9.8 9.6 9.9   Total Protein 7.2     Albumin 4.20 4.3 4.2   Globulin 3.0     Total Bilirubin 0.4     Alkaline Phosphatase 80     AST (SGOT) 19     ALT (SGPT) 16     Albumin/Globulin Ratio 1.4     BUN/Creatinine Ratio 11.4 13.7 11.3   Anion Gap 11.4 11.0 11.9   (A) Abnormal value       Comments are available for some flowsheets but are not being displayed.           CBC w/diff    CBC w/Diff 11/29/22 1/5/23 3/7/23 3/7/23      1107 1615   WBC 6.20 6.98 6.31    RBC 3.51 (A) 3.15 (A) 3.44 (A)    Hemoglobin 10.1 (A) 9.5 (A) 10.3 (A) 9.7 (A)   Hematocrit 32.2 (A) 29.4 (A) 31.1 (A) 31.0 (A)   MCV 91.7 93.3 90.4    MCH 28.8 30.2 29.9    MCHC 31.4 (A) 32.3 33.1    RDW 14.5 13.9 14.2    Platelets 129 (A) 111 (A) 117 (A)    Neutrophil Rel % 53.1 50.1 53.2    Immature Granulocyte Rel % 0.2 0.3 0.2    Lymphocyte Rel % 32.9 38.8 34.9    Monocyte Rel % 7.9 6.6 7.6    Eosinophil Rel % 5.3 3.6 3.6    Basophil Rel % 0.6 0.6 0.5    (A) Abnormal value             Lab Results   Component Value Date    TSH 16.900 (H) 12/30/2022      Lab Results   Component Value Date    FREET4 0.92 (L) 12/30/2022      No results found for: DDIMERQUANT  Magnesium   Date Value Ref Range Status   11/29/2022 1.7 1.6 - 2.4 mg/dL Final      No results found for: DIGOXIN   Lab Results   Component Value Date    TROPONINT <0.010 07/10/2022           Lipid Panel    Lipid Panel 4/11/22 6/15/22 11/29/22   Total Cholesterol 120 124 128   Triglycerides 42 27 141   HDL Cholesterol 54 80 (A) 43   VLDL Cholesterol 11 9 25   LDL Cholesterol  55 35 60   LDL/HDL Ratio 1.07 0.48 1.32   (A) Abnormal value            No results found for: POCTROP         ECG 12 Lead    Date/Time: 3/22/2023 9:17 AM  Performed by: Logan Nicole MD  Authorized by: Logan Nicole MD   Comparison: compared with previous  ECG   Similar to previous ECG  Rhythm: sinus rhythm  Conduction: 1st degree AV block                   Diagnoses and all orders for this visit:    1. Essential hypertension (Primary)    2. Mixed dyslipidemia    3. CKD (chronic kidney disease) stage 5, GFR less than 15 ml/min (HCC)    Other orders  -     carvedilol (COREG) 12.5 MG tablet; Take 1 tablet by mouth 2 (Two) Times a Day.  Dispense: 180 tablet; Refill: 3      Assessment:    -Hypertension: Has been stable on current regimen which will be continued.  ECG from today was noted and was unremarkable.  Recent echocardiogram was reviewed and shows normal LV systolic function without significant valvular pathology.    -Mixed dyslipidemia: Most recent lipid profile was noted and was unremarkable.  Continue atorvastatin 40 mg daily.    -CKD stage V: Related to hypertension and diabetes nephropathy.  Plan to undergo peritoneal dialysis catheter placement next week.  Followed by nephrology.    Follow Up     Return in about 6 months (around 9/22/2023).        Patient was given instructions and counseling regarding his condition or for health maintenance advice. Please see specific information pulled into the AVS if appropriate.

## 2023-03-27 ENCOUNTER — HOSPITAL ENCOUNTER (OUTPATIENT)
Facility: HOSPITAL | Age: 78
Setting detail: HOSPITAL OUTPATIENT SURGERY
Discharge: HOME | End: 2023-03-27
Attending: SURGERY | Admitting: SURGERY
Payer: MEDICARE

## 2023-03-27 ENCOUNTER — ANESTHESIA (OUTPATIENT)
Dept: PERIOP | Facility: HOSPITAL | Age: 78
End: 2023-03-27
Payer: MEDICARE

## 2023-03-27 ENCOUNTER — ANESTHESIA EVENT (OUTPATIENT)
Dept: PERIOP | Facility: HOSPITAL | Age: 78
End: 2023-03-27
Payer: MEDICARE

## 2023-03-27 VITALS
HEIGHT: 66 IN | OXYGEN SATURATION: 95 % | HEART RATE: 79 BPM | TEMPERATURE: 97.6 F | BODY MASS INDEX: 24.48 KG/M2 | DIASTOLIC BLOOD PRESSURE: 77 MMHG | WEIGHT: 152.34 LBS | SYSTOLIC BLOOD PRESSURE: 156 MMHG | RESPIRATION RATE: 16 BRPM

## 2023-03-27 DIAGNOSIS — N18.5 CKD (CHRONIC KIDNEY DISEASE) STAGE 5, GFR LESS THAN 15 ML/MIN: ICD-10-CM

## 2023-03-27 DIAGNOSIS — Z99.2 PERITONEAL DIALYSIS CATHETER IN PLACE: Primary | ICD-10-CM

## 2023-03-27 LAB
ANION GAP SERPL CALCULATED.3IONS-SCNC: 13 MMOL/L (ref 5–15)
BASOPHILS # BLD AUTO: 0.03 10*3/MM3 (ref 0–0.2)
BASOPHILS NFR BLD AUTO: 0.5 % (ref 0–1.5)
BUN SERPL-MCNC: 72 MG/DL (ref 8–23)
BUN/CREAT SERPL: 12.8 (ref 7–25)
CALCIUM SPEC-SCNC: 9 MG/DL (ref 8.6–10.5)
CHLORIDE SERPL-SCNC: 106 MMOL/L (ref 98–107)
CO2 SERPL-SCNC: 21 MMOL/L (ref 22–29)
CREAT SERPL-MCNC: 5.61 MG/DL (ref 0.76–1.27)
DEPRECATED RDW RBC AUTO: 51.3 FL (ref 37–54)
EGFRCR SERPLBLD CKD-EPI 2021: 9.8 ML/MIN/1.73
EOSINOPHIL # BLD AUTO: 0.25 10*3/MM3 (ref 0–0.4)
EOSINOPHIL NFR BLD AUTO: 3.8 % (ref 0.3–6.2)
ERYTHROCYTE [DISTWIDTH] IN BLOOD BY AUTOMATED COUNT: 14.8 % (ref 12.3–15.4)
GLUCOSE BLDC GLUCOMTR-MCNC: 86 MG/DL (ref 70–130)
GLUCOSE BLDC GLUCOMTR-MCNC: 87 MG/DL (ref 70–130)
GLUCOSE SERPL-MCNC: 73 MG/DL (ref 65–99)
HCT VFR BLD AUTO: 30 % (ref 37.5–51)
HGB BLD-MCNC: 9.5 G/DL (ref 13–17.7)
IMM GRANULOCYTES # BLD AUTO: 0.02 10*3/MM3 (ref 0–0.05)
IMM GRANULOCYTES NFR BLD AUTO: 0.3 % (ref 0–0.5)
LYMPHOCYTES # BLD AUTO: 2.52 10*3/MM3 (ref 0.7–3.1)
LYMPHOCYTES NFR BLD AUTO: 38.5 % (ref 19.6–45.3)
MCH RBC QN AUTO: 30.1 PG (ref 26.6–33)
MCHC RBC AUTO-ENTMCNC: 31.7 G/DL (ref 31.5–35.7)
MCV RBC AUTO: 94.9 FL (ref 79–97)
MONOCYTES # BLD AUTO: 0.55 10*3/MM3 (ref 0.1–0.9)
MONOCYTES NFR BLD AUTO: 8.4 % (ref 5–12)
NEUTROPHILS NFR BLD AUTO: 3.18 10*3/MM3 (ref 1.7–7)
NEUTROPHILS NFR BLD AUTO: 48.5 % (ref 42.7–76)
NRBC BLD AUTO-RTO: 0 /100 WBC (ref 0–0.2)
PLATELET # BLD AUTO: 107 10*3/MM3 (ref 140–450)
PMV BLD AUTO: 11.9 FL (ref 6–12)
POTASSIUM SERPL-SCNC: 4.7 MMOL/L (ref 3.5–5.2)
RBC # BLD AUTO: 3.16 10*6/MM3 (ref 4.14–5.8)
SODIUM SERPL-SCNC: 140 MMOL/L (ref 136–145)
WBC NRBC COR # BLD: 6.55 10*3/MM3 (ref 3.4–10.8)

## 2023-03-27 PROCEDURE — 25010000002 HEPARIN (PORCINE) PER 1000 UNITS: Performed by: SURGERY

## 2023-03-27 PROCEDURE — 49324 LAP INSERT TUNNEL IP CATH: CPT | Performed by: PHYSICIAN ASSISTANT

## 2023-03-27 PROCEDURE — 49326 LAP W/OMENTOPEXY ADD-ON: CPT | Performed by: SURGERY

## 2023-03-27 PROCEDURE — S0260 H&P FOR SURGERY: HCPCS | Performed by: SURGERY

## 2023-03-27 PROCEDURE — 25010000002 FENTANYL CITRATE (PF) 50 MCG/ML SOLUTION: Performed by: NURSE ANESTHETIST, CERTIFIED REGISTERED

## 2023-03-27 PROCEDURE — 49324 LAP INSERT TUNNEL IP CATH: CPT | Performed by: SURGERY

## 2023-03-27 PROCEDURE — 25010000002 ONDANSETRON PER 1 MG: Performed by: NURSE ANESTHETIST, CERTIFIED REGISTERED

## 2023-03-27 PROCEDURE — 25010000002 HYDRALAZINE PER 20 MG: Performed by: NURSE ANESTHETIST, CERTIFIED REGISTERED

## 2023-03-27 PROCEDURE — 49326 LAP W/OMENTOPEXY ADD-ON: CPT | Performed by: PHYSICIAN ASSISTANT

## 2023-03-27 PROCEDURE — 25010000002 PROPOFOL 10 MG/ML EMULSION: Performed by: NURSE ANESTHETIST, CERTIFIED REGISTERED

## 2023-03-27 PROCEDURE — 25010000002 DEXAMETHASONE PER 1 MG: Performed by: NURSE ANESTHETIST, CERTIFIED REGISTERED

## 2023-03-27 PROCEDURE — C1750 CATH, HEMODIALYSIS,LONG-TERM: HCPCS | Performed by: SURGERY

## 2023-03-27 PROCEDURE — 80048 BASIC METABOLIC PNL TOTAL CA: CPT | Performed by: SURGERY

## 2023-03-27 PROCEDURE — 85025 COMPLETE CBC W/AUTO DIFF WBC: CPT | Performed by: SURGERY

## 2023-03-27 PROCEDURE — 25010000002 HYDROMORPHONE PER 4 MG: Performed by: NURSE ANESTHETIST, CERTIFIED REGISTERED

## 2023-03-27 PROCEDURE — 25010000002 CEFAZOLIN IN DEXTROSE 2-4 GM/100ML-% SOLUTION: Performed by: SURGERY

## 2023-03-27 PROCEDURE — 82962 GLUCOSE BLOOD TEST: CPT

## 2023-03-27 DEVICE — IMPLANTABLE DEVICE: Type: IMPLANTABLE DEVICE | Site: ABDOMEN | Status: FUNCTIONAL

## 2023-03-27 RX ORDER — OXYCODONE HYDROCHLORIDE AND ACETAMINOPHEN 5; 325 MG/1; MG/1
1 TABLET ORAL EVERY 6 HOURS PRN
Qty: 20 TABLET | Refills: 0 | Status: SHIPPED | OUTPATIENT
Start: 2023-03-27

## 2023-03-27 RX ORDER — SODIUM CHLORIDE 0.9 % (FLUSH) 0.9 %
3-10 SYRINGE (ML) INJECTION AS NEEDED
Status: DISCONTINUED | OUTPATIENT
Start: 2023-03-27 | End: 2023-03-27 | Stop reason: HOSPADM

## 2023-03-27 RX ORDER — HYDROCODONE BITARTRATE AND ACETAMINOPHEN 7.5; 325 MG/1; MG/1
1 TABLET ORAL ONCE AS NEEDED
Status: DISCONTINUED | OUTPATIENT
Start: 2023-03-27 | End: 2023-03-27 | Stop reason: HOSPADM

## 2023-03-27 RX ORDER — LIDOCAINE HYDROCHLORIDE 20 MG/ML
INJECTION, SOLUTION INFILTRATION; PERINEURAL AS NEEDED
Status: DISCONTINUED | OUTPATIENT
Start: 2023-03-27 | End: 2023-03-27 | Stop reason: SURG

## 2023-03-27 RX ORDER — SENNA PLUS 8.6 MG/1
1 TABLET ORAL ONCE
Status: COMPLETED | OUTPATIENT
Start: 2023-03-27 | End: 2023-03-27

## 2023-03-27 RX ORDER — SODIUM CHLORIDE 9 MG/ML
9 INJECTION, SOLUTION INTRAVENOUS CONTINUOUS
Status: DISCONTINUED | OUTPATIENT
Start: 2023-03-27 | End: 2023-03-27 | Stop reason: HOSPADM

## 2023-03-27 RX ORDER — FENTANYL CITRATE 50 UG/ML
INJECTION, SOLUTION INTRAMUSCULAR; INTRAVENOUS AS NEEDED
Status: DISCONTINUED | OUTPATIENT
Start: 2023-03-27 | End: 2023-03-27 | Stop reason: SURG

## 2023-03-27 RX ORDER — EPHEDRINE SULFATE 50 MG/ML
INJECTION INTRAVENOUS AS NEEDED
Status: DISCONTINUED | OUTPATIENT
Start: 2023-03-27 | End: 2023-03-27 | Stop reason: SURG

## 2023-03-27 RX ORDER — HYDRALAZINE HYDROCHLORIDE 20 MG/ML
5 INJECTION INTRAMUSCULAR; INTRAVENOUS
Status: DISCONTINUED | OUTPATIENT
Start: 2023-03-27 | End: 2023-03-27 | Stop reason: HOSPADM

## 2023-03-27 RX ORDER — FENTANYL CITRATE 50 UG/ML
50 INJECTION, SOLUTION INTRAMUSCULAR; INTRAVENOUS
Status: DISCONTINUED | OUTPATIENT
Start: 2023-03-27 | End: 2023-03-27 | Stop reason: HOSPADM

## 2023-03-27 RX ORDER — DROPERIDOL 2.5 MG/ML
0.62 INJECTION, SOLUTION INTRAMUSCULAR; INTRAVENOUS
Status: DISCONTINUED | OUTPATIENT
Start: 2023-03-27 | End: 2023-03-27 | Stop reason: HOSPADM

## 2023-03-27 RX ORDER — DEXAMETHASONE SODIUM PHOSPHATE 4 MG/ML
INJECTION, SOLUTION INTRA-ARTICULAR; INTRALESIONAL; INTRAMUSCULAR; INTRAVENOUS; SOFT TISSUE AS NEEDED
Status: DISCONTINUED | OUTPATIENT
Start: 2023-03-27 | End: 2023-03-27 | Stop reason: SURG

## 2023-03-27 RX ORDER — MIDAZOLAM HYDROCHLORIDE 1 MG/ML
0.5 INJECTION INTRAMUSCULAR; INTRAVENOUS
Status: DISCONTINUED | OUTPATIENT
Start: 2023-03-27 | End: 2023-03-27 | Stop reason: HOSPADM

## 2023-03-27 RX ORDER — FAMOTIDINE 10 MG/ML
20 INJECTION, SOLUTION INTRAVENOUS ONCE
Status: COMPLETED | OUTPATIENT
Start: 2023-03-27 | End: 2023-03-27

## 2023-03-27 RX ORDER — POLYETHYLENE GLYCOL 3350 17 G/17G
17 POWDER, FOR SOLUTION ORAL DAILY PRN
Qty: 510 G | Refills: 2 | Status: SHIPPED | OUTPATIENT
Start: 2023-03-27

## 2023-03-27 RX ORDER — SODIUM CHLORIDE 0.9 % (FLUSH) 0.9 %
3 SYRINGE (ML) INJECTION EVERY 12 HOURS SCHEDULED
Status: DISCONTINUED | OUTPATIENT
Start: 2023-03-27 | End: 2023-03-27 | Stop reason: HOSPADM

## 2023-03-27 RX ORDER — FLUMAZENIL 0.1 MG/ML
0.2 INJECTION INTRAVENOUS AS NEEDED
Status: DISCONTINUED | OUTPATIENT
Start: 2023-03-27 | End: 2023-03-27 | Stop reason: HOSPADM

## 2023-03-27 RX ORDER — LABETALOL HYDROCHLORIDE 5 MG/ML
5 INJECTION, SOLUTION INTRAVENOUS
Status: DISCONTINUED | OUTPATIENT
Start: 2023-03-27 | End: 2023-03-27 | Stop reason: HOSPADM

## 2023-03-27 RX ORDER — ONDANSETRON 4 MG/1
4 TABLET, FILM COATED ORAL EVERY 8 HOURS PRN
Qty: 10 TABLET | Refills: 0 | Status: SHIPPED | OUTPATIENT
Start: 2023-03-27 | End: 2024-03-26

## 2023-03-27 RX ORDER — SODIUM CHLORIDE, SODIUM LACTATE, POTASSIUM CHLORIDE, CALCIUM CHLORIDE 600; 310; 30; 20 MG/100ML; MG/100ML; MG/100ML; MG/100ML
9 INJECTION, SOLUTION INTRAVENOUS CONTINUOUS
Status: DISCONTINUED | OUTPATIENT
Start: 2023-03-27 | End: 2023-03-27

## 2023-03-27 RX ORDER — OXYCODONE AND ACETAMINOPHEN 7.5; 325 MG/1; MG/1
1 TABLET ORAL EVERY 4 HOURS PRN
Status: DISCONTINUED | OUTPATIENT
Start: 2023-03-27 | End: 2023-03-27 | Stop reason: HOSPADM

## 2023-03-27 RX ORDER — BUPIVACAINE HYDROCHLORIDE AND EPINEPHRINE 5; 5 MG/ML; UG/ML
INJECTION, SOLUTION EPIDURAL; INTRACAUDAL; PERINEURAL AS NEEDED
Status: DISCONTINUED | OUTPATIENT
Start: 2023-03-27 | End: 2023-03-27 | Stop reason: HOSPADM

## 2023-03-27 RX ORDER — OXYCODONE HYDROCHLORIDE AND ACETAMINOPHEN 5; 325 MG/1; MG/1
1 TABLET ORAL ONCE AS NEEDED
Status: DISCONTINUED | OUTPATIENT
Start: 2023-03-27 | End: 2023-03-27 | Stop reason: HOSPADM

## 2023-03-27 RX ORDER — ACETAMINOPHEN 325 MG/1
650 TABLET ORAL ONCE
Status: COMPLETED | OUTPATIENT
Start: 2023-03-27 | End: 2023-03-27

## 2023-03-27 RX ORDER — ROCURONIUM BROMIDE 10 MG/ML
INJECTION, SOLUTION INTRAVENOUS AS NEEDED
Status: DISCONTINUED | OUTPATIENT
Start: 2023-03-27 | End: 2023-03-27 | Stop reason: SURG

## 2023-03-27 RX ORDER — ONDANSETRON 2 MG/ML
4 INJECTION INTRAMUSCULAR; INTRAVENOUS ONCE AS NEEDED
Status: DISCONTINUED | OUTPATIENT
Start: 2023-03-27 | End: 2023-03-27 | Stop reason: HOSPADM

## 2023-03-27 RX ORDER — IPRATROPIUM BROMIDE AND ALBUTEROL SULFATE 2.5; .5 MG/3ML; MG/3ML
3 SOLUTION RESPIRATORY (INHALATION) ONCE AS NEEDED
Status: DISCONTINUED | OUTPATIENT
Start: 2023-03-27 | End: 2023-03-27 | Stop reason: HOSPADM

## 2023-03-27 RX ORDER — MAGNESIUM HYDROXIDE 1200 MG/15ML
LIQUID ORAL AS NEEDED
Status: DISCONTINUED | OUTPATIENT
Start: 2023-03-27 | End: 2023-03-27 | Stop reason: HOSPADM

## 2023-03-27 RX ORDER — PROMETHAZINE HYDROCHLORIDE 25 MG/1
12.5 TABLET ORAL ONCE AS NEEDED
Status: DISCONTINUED | OUTPATIENT
Start: 2023-03-27 | End: 2023-03-27 | Stop reason: HOSPADM

## 2023-03-27 RX ORDER — LIDOCAINE HYDROCHLORIDE 10 MG/ML
0.5 INJECTION, SOLUTION EPIDURAL; INFILTRATION; INTRACAUDAL; PERINEURAL ONCE AS NEEDED
Status: DISCONTINUED | OUTPATIENT
Start: 2023-03-27 | End: 2023-03-27 | Stop reason: HOSPADM

## 2023-03-27 RX ORDER — NALOXONE HCL 0.4 MG/ML
0.2 VIAL (ML) INJECTION AS NEEDED
Status: DISCONTINUED | OUTPATIENT
Start: 2023-03-27 | End: 2023-03-27 | Stop reason: HOSPADM

## 2023-03-27 RX ORDER — EPHEDRINE SULFATE 50 MG/ML
5 INJECTION, SOLUTION INTRAVENOUS ONCE AS NEEDED
Status: DISCONTINUED | OUTPATIENT
Start: 2023-03-27 | End: 2023-03-27 | Stop reason: HOSPADM

## 2023-03-27 RX ORDER — DIPHENHYDRAMINE HYDROCHLORIDE 50 MG/ML
12.5 INJECTION INTRAMUSCULAR; INTRAVENOUS
Status: DISCONTINUED | OUTPATIENT
Start: 2023-03-27 | End: 2023-03-27 | Stop reason: HOSPADM

## 2023-03-27 RX ORDER — ONDANSETRON 2 MG/ML
INJECTION INTRAMUSCULAR; INTRAVENOUS AS NEEDED
Status: DISCONTINUED | OUTPATIENT
Start: 2023-03-27 | End: 2023-03-27 | Stop reason: SURG

## 2023-03-27 RX ORDER — PROMETHAZINE HYDROCHLORIDE 25 MG/1
25 TABLET ORAL ONCE AS NEEDED
Status: DISCONTINUED | OUTPATIENT
Start: 2023-03-27 | End: 2023-03-27 | Stop reason: HOSPADM

## 2023-03-27 RX ORDER — CEFAZOLIN SODIUM 2 G/100ML
2 INJECTION, SOLUTION INTRAVENOUS ONCE
Status: COMPLETED | OUTPATIENT
Start: 2023-03-27 | End: 2023-03-27

## 2023-03-27 RX ORDER — AMOXICILLIN 250 MG
2 CAPSULE ORAL DAILY
Qty: 120 TABLET | Refills: 2 | Status: SHIPPED | OUTPATIENT
Start: 2023-03-27 | End: 2023-09-23

## 2023-03-27 RX ORDER — PROPOFOL 10 MG/ML
VIAL (ML) INTRAVENOUS AS NEEDED
Status: DISCONTINUED | OUTPATIENT
Start: 2023-03-27 | End: 2023-03-27 | Stop reason: SURG

## 2023-03-27 RX ORDER — HYDROMORPHONE HYDROCHLORIDE 1 MG/ML
0.5 INJECTION, SOLUTION INTRAMUSCULAR; INTRAVENOUS; SUBCUTANEOUS
Status: DISCONTINUED | OUTPATIENT
Start: 2023-03-27 | End: 2023-03-27 | Stop reason: HOSPADM

## 2023-03-27 RX ORDER — PROMETHAZINE HYDROCHLORIDE 25 MG/1
25 SUPPOSITORY RECTAL ONCE AS NEEDED
Status: DISCONTINUED | OUTPATIENT
Start: 2023-03-27 | End: 2023-03-27 | Stop reason: HOSPADM

## 2023-03-27 RX ADMIN — CEFAZOLIN SODIUM 2 G: 2 INJECTION, SOLUTION INTRAVENOUS at 11:58

## 2023-03-27 RX ADMIN — FENTANYL CITRATE 50 MCG: 50 INJECTION, SOLUTION INTRAMUSCULAR; INTRAVENOUS at 12:13

## 2023-03-27 RX ADMIN — ACETAMINOPHEN 650 MG: 325 TABLET, FILM COATED ORAL at 14:08

## 2023-03-27 RX ADMIN — OXYCODONE AND ACETAMINOPHEN 1 TABLET: 5; 325 TABLET ORAL at 14:08

## 2023-03-27 RX ADMIN — FAMOTIDINE 20 MG: 10 INJECTION, SOLUTION INTRAVENOUS at 11:07

## 2023-03-27 RX ADMIN — HYDROMORPHONE HYDROCHLORIDE 0.5 MG: 1 INJECTION, SOLUTION INTRAMUSCULAR; INTRAVENOUS; SUBCUTANEOUS at 14:08

## 2023-03-27 RX ADMIN — PROPOFOL 150 MG: 10 INJECTION, EMULSION INTRAVENOUS at 12:13

## 2023-03-27 RX ADMIN — LIDOCAINE HYDROCHLORIDE 100 MG: 20 INJECTION, SOLUTION INFILTRATION; PERINEURAL at 12:13

## 2023-03-27 RX ADMIN — ROCURONIUM BROMIDE 50 MG: 10 INJECTION, SOLUTION INTRAVENOUS at 12:13

## 2023-03-27 RX ADMIN — EPHEDRINE SULFATE 10 MG: 50 INJECTION INTRAVENOUS at 12:55

## 2023-03-27 RX ADMIN — SENNOSIDES 1 TABLET: 8.6 TABLET, FILM COATED ORAL at 14:08

## 2023-03-27 RX ADMIN — SODIUM CHLORIDE 9 ML: 9 INJECTION, SOLUTION INTRAVENOUS at 11:05

## 2023-03-27 RX ADMIN — SUGAMMADEX 150 MG: 100 INJECTION, SOLUTION INTRAVENOUS at 13:38

## 2023-03-27 RX ADMIN — HYDRALAZINE HYDROCHLORIDE 5 MG: 20 INJECTION INTRAMUSCULAR; INTRAVENOUS at 14:35

## 2023-03-27 RX ADMIN — ONDANSETRON 4 MG: 2 INJECTION INTRAMUSCULAR; INTRAVENOUS at 13:27

## 2023-03-27 RX ADMIN — EPHEDRINE SULFATE 10 MG: 50 INJECTION INTRAVENOUS at 12:30

## 2023-03-27 RX ADMIN — DEXAMETHASONE SODIUM PHOSPHATE 4 MG: 4 INJECTION, SOLUTION INTRA-ARTICULAR; INTRALESIONAL; INTRAMUSCULAR; INTRAVENOUS; SOFT TISSUE at 13:25

## 2023-03-27 NOTE — ANESTHESIA POSTPROCEDURE EVALUATION
Patient: Nelson Wang    Procedure Summary     Date: 03/27/23 Room / Location: [facility] OR [address] [place] [address] MAIN OR    Anesthesia Start: 1206 Anesthesia Stop: 1352    Procedure: LAPAROSCOPIC INSERTION PERITONEAL DIALYSIS CATHETER, LAPAROSCOPIC OMENTOPEXY WITH LYSIS OF ADHESIONS (Abdomen) Diagnosis:       CKD (chronic kidney disease) stage 5, GFR less than 15 ml/min (HCC)      (CKD (chronic kidney disease) stage 5, GFR less than 15 ml/min (HCC) [N18.5])    Surgeons: Jose Berry MD Provider: Vamshi Saldivar MD    Anesthesia Type: general ASA Status: 3          Anesthesia Type: general    Vitals  Vitals Value Taken Time   /75 03/27/23 1501   Temp 36.4 °C (97.6 °F) 03/27/23 1500   Pulse 71 03/27/23 1504   Resp 16 03/27/23 1500   SpO2 96 % 03/27/23 1505   Vitals shown include unvalidated device data.        Post Anesthesia Care and Evaluation    Patient location during evaluation: PACU  Patient participation: complete - patient participated  Level of consciousness: awake and alert  Pain management: adequate    Airway patency: patent  Anesthetic complications: No anesthetic complications    Cardiovascular status: acceptable  Respiratory status: acceptable  Hydration status: acceptable    Comments: --------------------            03/27/23               1518     --------------------   BP:       174/74     Pulse:      72       Resp:       16       Temp:                SpO2:      94%      --------------------

## 2023-03-27 NOTE — ANESTHESIA PROCEDURE NOTES
Airway  Urgency: elective    Date/Time: 3/27/2023 12:17 PM  Airway not difficult    General Information and Staff    Patient location during procedure: OR  Anesthesiologist: Vamshi Saldivar MD  CRNA/CAA: Lindy Kaur CRNA    Indications and Patient Condition  Indications for airway management: airway protection    Preoxygenated: yes (pt pre-O2 with 100% O2)  MILS maintained throughout  Mask difficulty assessment: 2 - vent by mask + OA or adjuvant +/- NMBA (easy BMV )    Final Airway Details  Final airway type: endotracheal airway      Successful airway: ETT  Cuffed: yes   Successful intubation technique: direct laryngoscopy  Endotracheal tube insertion site: oral  Blade: Myrick  Blade size: 2  ETT size (mm): 7.5  Cormack-Lehane Classification: grade I - full view of glottis  Placement verified by: chest auscultation and capnometry   Cuff volume (mL): 7  Measured from: lips  ETT/EBT  to lips (cm): 23  Number of attempts at approach: 1  Assessment: lips, teeth, and gum same as pre-op and atraumatic intubation    Additional Comments  ATOETx1. No change in dentition.

## 2023-03-27 NOTE — ANESTHESIA PREPROCEDURE EVALUATION
Anesthesia Evaluation     Patient summary reviewed and Nursing notes reviewed   NPO Solid Status: > 8 hours  NPO Liquid Status: > 4 hours           Airway   Mallampati: II  Neck ROM: full  No difficulty expected  Dental    (+) poor dentition    Pulmonary     breath sounds clear to auscultation  (+) a smoker Former,   Cardiovascular     Rhythm: regular    (+) hypertension, dysrhythmias Bradycardia, hyperlipidemia,       Neuro/Psych  GI/Hepatic/Renal/Endo    (+)   renal disease, diabetes mellitus, thyroid problem hypothyroidism    Musculoskeletal     Abdominal    Substance History      OB/GYN          Other   blood dyscrasia anemia thrombocytopenia,                   Anesthesia Plan    ASA 3     general     intravenous induction     Anesthetic plan, risks, benefits, and alternatives have been provided, discussed and informed consent has been obtained with: patient.        CODE STATUS:

## 2023-03-27 NOTE — OP NOTE
DATE OF PROCEDURE: 3/27/2023    SURGEON: Jose Berry MD     ASSISTANT: Josephine marin PA-C that was in charge of retraction, exposure, camera holding, closing wounds and placing dressing that was essential for the success of the case    PREOPERATIVE DIAGNOSES:   1. Chronic kidney disease in need of dialysis.     POSTOPERATIVE DIAGNOSES:   1. Chronic kidney disease in need of dialysis.   2.  Intra-abdominal adhesions    PROCEDURES PERFORMED:   1. Laparoscopic insertion of [place] peritoneal dialysis catheter.  2.  Laparoscopic lysis of additions  3.  Laparoscopic omentopexy    ANESTHESIA: General.   ESTIMATED BLOOD LOSS: Minimal.   SPECIMEN: None.   IMPLANT: [place] peritoneal dialysis catheter.   FINDINGS: Great catheter flow.  Redundant omentum attached to the anterior abdominal wall and lower pelvis, extremely thin abdominal wall with loss of posterior rectus sheath at the area of prior surgery     COMPLICATIONS: None.     INDICATIONS FOR PROCEDURE: The patient is a very pleasant 78 y.o. year old male with chronic kidney disease in need of dialysis.  The patient was evaluated for peritoneal dialysis and was found to be good candidate. I offered the patient laparoscopic peritoneal dialysis catheter placement. The risks and benefits of the procedure were explained to the patient. The patient verbalized understanding and agreed with the plan.     DESCRIPTION OF PROCEDURE: The patient was taken to the operating room and was placed on the OR table in the supine position. Preoperative antibiotics were given and SCD's were placed. General endotracheal anesthesia was then induced. The patient's abdomen was then prepped and draped in the usual sterile fashion. Time out was performed and the patient was correctly identified. I started the procedure by injecting 0.5% Marcaine with epinephrine in the left epigastric area. With optiview technique a 5 mm trocar was introduced in the patient abdomen.  I then proceeded to  insufflate patient's abdomen again, and upon exploration of the abdominal cavity there was no injury from the trocar placement.  There were adhesions from the omentum from the epigastric area all the way down to the pelvis.  The omentum was very redundant and filling the pelvis.  I decided to place another two 5 mm trocars in the left upper and mid abdomen and this was done under direct laparoscopic vision.  Started taking adhesions with blunt and sharp dissection initially and then switch to ultrasonic denise.  I was able to bring down the omentum that was attached to the anterior abdominal wall.  There was evidence of disruption of the posterior rectus sheath at the area of prior paramedian incision although there was no clear evidence of hernia.  I then decided to perform an omentopexy to bring the omentum to the right upper quadrant.  Stab incision was performed with scalpel in the right upper quadrant and with Endo Close and 0 silk after several passes of the omentum were performed the omentum was secured to the anterior abdominal wall after tying the suture.    After this, 0.5% Marcaine was injected in the left  periumbilical area and an incision was performed. An 8 mm trocar was then placed in that position at 60°. It was pointed towards the pelvis. An [place] coiled catheter was then placed through the trocar and the trocar was removed. It was positioned in the retrovesical area. The first cuff was withdrawn back to the level of the rectus muscle fascia. The catheter was then tunnelized and an exit site was selected in the left upper quadrant.I then proceeded to desufflate the pneumoperitoneum and let 500 mL of heparinized saline run. The fluid flowed without any resistance. The catheter was placed then to gravity and good flow of the fluid was found and 300 mL of heparinized saline was retrieved. The catheter was then capped and pneumoperitoneum was again insufflated. Upon exploration of the abdominal  cavity, there was no evidence of injury from the procedure.I then proceeded to remove all the trocars under direct laparoscopic vision. The catheter was then flushed with 25 mL of heparinized saline without any resistance to the flow. All the incisions were then closed in 2 layers with 3-0 Vicryl and 4-0 Monocryl. The catheter was secured in place with Steri-Strips. A biopatch was placed around the exit site.  All the incisions were covered with 4 x 4 and Tegaderm. The patient was awakened in the operating room and was taken to the recovery area in stable condition.     Jose Berry M.D.

## 2023-03-27 NOTE — H&P
Chief Complaint   Patient presents with   • PD CATHETER            Subjective          Nelson Wang is a 77 y.o. male who is referred by Coreen Castro MD to be evaluated for peritoneal dialysis catheter placement. Patient has CKD STAGE 5 secondary to diabetic nephropathy and hypertensive nephrosclerosis and  is not on dialysis. Patient does not have a AV access.  Patient has not had a recent intraabdominal infection. The patient reports having reguar bowel movements.  Patient did have a Colonoscopy.      Home Evaluation: YES     Medical History        Past Medical History:   Diagnosis Date   • Anemia     • CKD (chronic kidney disease), stage IV (HCC)     • Diabetes (HCC)     • Gout     • High cholesterol     • HL (hearing loss)     • Hyperkalemia     • Hypertension     • Hypothyroidism     • Psoriasis     • Vitiligo              Surgical History         Past Surgical History:   Procedure Laterality Date   • ANKLE SURGERY   11/1990   • APPENDECTOMY N/A 1954   • COLONOSCOPY N/A 06/2022     [facility]   • HIP BIPOLAR REPLACEMENT Right 01/2000   • RENAL BIOPSY Left 07/15/2022               Current Outpatient Medications:   •  allopurinol (ZYLOPRIM) 100 MG tablet, Take 200 mg by mouth 2 (Two) Times a Day., Disp: , Rfl:   •  ascorbic acid (VITAMIN C) 1000 MG tablet, Take 1,000 mg by mouth., Disp: , Rfl:   •  atorvastatin (Lipitor) 40 MG tablet, Take 1 tablet by mouth Daily., Disp: 90 tablet, Rfl: 3  •  Calcium Carbonate-Vitamin D (calcium-vitamin D) 500-200 MG-UNIT tablet per tablet, Take 1 tablet by mouth., Disp: , Rfl:   •  carvedilol (COREG) 25 MG tablet, Take 25 mg by mouth 2 (Two) Times a Day With Meals., Disp: , Rfl:   •  Euthyrox 75 MCG tablet, Take 1 tablet by mouth once daily, Disp: 90 tablet, Rfl: 3  •  Insulin Glargine (LANTUS SOLOSTAR) 100 UNIT/ML injection pen, Inject 30 Units under the skin into the appropriate area as directed Daily. (Patient taking differently: Inject 14 Units under the skin  into the appropriate area as directed Every Night.), Disp: 15 pen, Rfl: 0  •  Omega-3 Fatty Acids (fish oil) 1000 MG capsule capsule, Take  by mouth., Disp: , Rfl:   •  Risankizumab-rzaa (SKYRIZI, 150 MG DOSE, SC), Inject  under the skin into the appropriate area as directed. Once every 3 months, Disp: , Rfl:   •  sertraline (ZOLOFT) 100 MG tablet, Take 100 mg by mouth Daily., Disp: , Rfl:   •  Torsemide 40 MG tablet, Take 40 mg by mouth 1 (One) Time., Disp: , Rfl:   •  Turmeric 500 MG capsule, Take  by mouth., Disp: , Rfl:      No Known Allergies     Family History   Problem Relation Age of Onset   • Cancer Sister 35   • Diabetes Sister     • Diabetes Mother     • Heart disease Father     • Diabetes Brother     • Heart disease Paternal Uncle           Social History   Social History            Socioeconomic History   • Marital status:    Tobacco Use   • Smoking status: Former Smoker       Packs/day: 1.00       Years: 10.00       Pack years: 10.00       Types: Cigarettes       Start date:        Quit date:        Years since quittin.6   • Smokeless tobacco: Never Used   • Tobacco comment: quit 25 years ago, chews nicotine gum   Vaping Use   • Vaping Use: Never used   Substance and Sexual Activity   • Alcohol use: Not Currently       Comment: 1 DRINK/DAY, 12 oz weekly   • Drug use: Never   • Sexual activity: Defer         REVIEW OF SYSTEMS    Review of Systems   Constitutional: Negative for activity change, appetite change and fatigue.   HENT: Negative for congestion, sinus pressure and sinus pain.    Eyes: Negative for discharge and itching.   Respiratory: Negative for apnea, cough and shortness of breath.    Cardiovascular: Negative for chest pain and leg swelling.   Gastrointestinal: Negative for abdominal distention, blood in stool, constipation and diarrhea.   Endocrine: Negative for cold intolerance and heat intolerance.   Genitourinary: Negative for difficulty urinating, frequency and  "hematuria.   Musculoskeletal: Negative for arthralgias and back pain.   Skin: Negative for color change.   Allergic/Immunologic: Negative for environmental allergies and food allergies.   Neurological: Negative for dizziness, light-headedness and numbness.   Hematological: Negative for adenopathy. Does not bruise/bleed easily.   Psychiatric/Behavioral: Negative for agitation and hallucinations.         Physical Examination  Ht 167.6 cm (66\")   BMI 24.53 kg/m²   Body mass index is 24.53 kg/m².  Physical Exam  HENT:      Head: Normocephalic and atraumatic.      Nose: Nose normal.      Mouth/Throat:      Pharynx: Oropharynx is clear.   Eyes:      Conjunctiva/sclera: Conjunctivae normal.   Pulmonary:      Effort: Pulmonary effort is normal.   Abdominal:      General: Bowel sounds are normal. There is no distension.      Palpations: Abdomen is soft. There is no mass.      Tenderness: There is no abdominal tenderness.      Hernia: No hernia is present.      Comments: Well-healed right paramedian incision,   Genitourinary:     Penis: Normal.       Testes: Normal.   Musculoskeletal:         General: Normal range of motion.      Cervical back: Normal range of motion and neck supple.   Skin:     General: Skin is warm.      Comments: Multiple skin hypopigmented areas consistent with vitiligo   Neurological:      General: No focal deficit present.      Mental Status: He is alert. Mental status is at baseline.   Psychiatric:         Mood and Affect: Mood normal.         Behavior: Behavior normal.         Labs:   8/11/2022  White blood cell count 5.5, hemoglobin 9.8, hematocrit 31.7, platelets 98  Glucose 242, BUN 67, creatinine 4.5, phosphorus 5.3  Uric acid 3.4  IRON profile normal     Assessment:   Nelson Wang is a 77 y.o. male with CKD due to  hypertensive nephrosclerosis that is not on dialysis and will need peritoneal dialysis catheter placement.         The procedure was explained in detail to the patient including " risks and benefits.  The benefits including the possibility of having dialysis at home without the side effects of the hemodialysis.  The risks including but not limited to catheter dislodgment, obstruction, malfunction, bleeding, infection and possible injury to surrounding organs during peritoneal access.  The patient understands that the catheter will not be used for dialysis for a period of approximately 2 weeks after its placement and that during this time they should not shower until the exit site is completely healed. The patient underwent at least one training session with the peritoneal dialysis nurse and their house was evaluated and is ready for the peritoneal dialysis. Patient verbalized understanding and agreed with the plan. All questions were answered at this time.        Plan:      Laparoscopic peritoneal dialysis catheter placement    Jose Berry MD, FACS  General, Minimally Invasive and Endoscopic Surgery  [facility]    [address], [address]  [place], [place], [address]  P: [phone]  F: [phone]

## 2023-03-27 NOTE — ADDENDUM NOTE
Addendum  created 03/27/23 1823 by Vamshi Saldivar MD    Attestation recorded in Intraprocedure, Intraprocedure Attestations filed

## 2023-03-31 ENCOUNTER — LAB (OUTPATIENT)
Dept: LAB | Facility: HOSPITAL | Age: 78
End: 2023-03-31
Payer: MEDICARE

## 2023-03-31 ENCOUNTER — TRANSCRIBE ORDERS (OUTPATIENT)
Dept: LAB | Facility: HOSPITAL | Age: 78
End: 2023-03-31
Payer: MEDICARE

## 2023-03-31 DIAGNOSIS — R39.15 URGENCY OF URINATION: ICD-10-CM

## 2023-03-31 DIAGNOSIS — R39.15 URGENCY OF URINATION: Primary | ICD-10-CM

## 2023-03-31 PROCEDURE — 87086 URINE CULTURE/COLONY COUNT: CPT

## 2023-03-31 PROCEDURE — 81001 URINALYSIS AUTO W/SCOPE: CPT

## 2023-04-01 LAB — BACTERIA SPEC AEROBE CULT: NO GROWTH

## 2023-04-05 ENCOUNTER — HOSPITAL ENCOUNTER (OUTPATIENT)
Dept: INFUSION THERAPY | Facility: HOSPITAL | Age: 78
Discharge: HOME | End: 2023-04-05
Admitting: INTERNAL MEDICINE
Payer: MEDICARE

## 2023-04-05 ENCOUNTER — TELEPHONE (OUTPATIENT)
Dept: INTERNAL MEDICINE | Facility: CLINIC | Age: 78
End: 2023-04-05
Payer: MEDICARE

## 2023-04-05 VITALS
DIASTOLIC BLOOD PRESSURE: 46 MMHG | RESPIRATION RATE: 20 BRPM | TEMPERATURE: 99.2 F | OXYGEN SATURATION: 93 % | HEART RATE: 71 BPM | WEIGHT: 153.22 LBS | BODY MASS INDEX: 24.62 KG/M2 | HEIGHT: 66 IN | SYSTOLIC BLOOD PRESSURE: 121 MMHG

## 2023-04-05 DIAGNOSIS — N18.5 STAGE 5 CHRONIC KIDNEY DISEASE: Primary | ICD-10-CM

## 2023-04-05 DIAGNOSIS — D63.1 ANEMIA DUE TO STAGE 5 CHRONIC KIDNEY DISEASE, NOT ON CHRONIC DIALYSIS: ICD-10-CM

## 2023-04-05 DIAGNOSIS — N18.5 ANEMIA DUE TO STAGE 5 CHRONIC KIDNEY DISEASE, NOT ON CHRONIC DIALYSIS: ICD-10-CM

## 2023-04-05 DIAGNOSIS — R07.81 RIB PAIN: Primary | ICD-10-CM

## 2023-04-05 LAB
HCT VFR BLD AUTO: 24.4 % (ref 37.5–51)
HGB BLD-MCNC: 7.8 G/DL (ref 13–17.7)

## 2023-04-05 PROCEDURE — 96372 THER/PROPH/DIAG INJ SC/IM: CPT

## 2023-04-05 PROCEDURE — 25010000002 DARBEPOETIN ALFA 40 MCG/0.4ML SOLUTION PREFILLED SYRINGE: Performed by: INTERNAL MEDICINE

## 2023-04-05 PROCEDURE — 85018 HEMOGLOBIN: CPT | Performed by: INTERNAL MEDICINE

## 2023-04-05 PROCEDURE — 85014 HEMATOCRIT: CPT | Performed by: INTERNAL MEDICINE

## 2023-04-05 RX ADMIN — DARBEPOETIN ALFA 40 MCG: 40 INJECTION, SOLUTION INTRAVENOUS; SUBCUTANEOUS at 16:07

## 2023-04-05 NOTE — TELEPHONE ENCOUNTER
Recent fall scheduled to see Shefali Flynn tomorrow .It is his 4th or 5th rib. Has infusion treatment today at our hospital will you order chest x ray.

## 2023-04-06 ENCOUNTER — OFFICE VISIT (OUTPATIENT)
Dept: INTERNAL MEDICINE | Facility: CLINIC | Age: 78
End: 2023-04-06
Payer: MEDICARE

## 2023-04-06 VITALS
SYSTOLIC BLOOD PRESSURE: 160 MMHG | WEIGHT: 154.8 LBS | HEART RATE: 56 BPM | TEMPERATURE: 98 F | DIASTOLIC BLOOD PRESSURE: 64 MMHG | OXYGEN SATURATION: 96 % | BODY MASS INDEX: 24.99 KG/M2
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
## 2023-04-06 DIAGNOSIS — R07.81 RIB PAIN ON RIGHT SIDE: Primary | ICD-10-CM

## 2023-04-06 NOTE — PROGRESS NOTES
Chief Complaint  Fall (While working in the garden /Right side of chest is bruised and painful for the past 2 days/Pain in right arm )    Subjective      Nelson Wang presents to [facility]  History of Present Illness    Nelson is here for a fall. He states he was working in the garden and fell. He stumbled due to uneven ground, not dizziness. He said the right side of his chest is bruised and painful. Right lower rib area. No bruising to right rib. He has pinpoint tenderness on right lower rib. He has some tenderness with laughing/breathing and movement.     He has a peritoneal catheter in place and there is some bruising around it.   Abdominal bruising started on Sunday   Surgery was March 31st  Has an appointment in [facility] on Tuesday for follow up   Dialysis flush is Monday.   Daughter said bruising is improving around abdomen  Patient denies internal/severe pain on abdomen  Abdominal bruising didn't develop after the fall, it developed before.       Current Outpatient Medications   Medication Instructions   • allopurinol (ZYLOPRIM) 300 mg, Oral, Nightly   • ascorbic acid (VITAMIN C) 1,000 mg, Oral, Nightly   • atorvastatin (LIPITOR) 40 mg, Oral, Daily   • calcium acetate (PHOS BINDER,) 667 MG capsule capsule 1 tablet, Oral, 3 Times Daily   • carvedilol (COREG) 12.5 mg, Oral, 2 Times Daily   • clobetasol (TEMOVATE) 0.05 % ointment No dose, route, or frequency recorded.   • Euthyrox 75 MCG tablet Take 1 tablet by mouth once daily   • fish oil 1,000 mg, Oral, Daily With Breakfast   • Insulin Glargine (LANTUS SOLOSTAR) 14 Units, Subcutaneous, Daily   • ondansetron (ZOFRAN) 4 mg, Oral, Every 8 Hours PRN   • oxyCODONE-acetaminophen (PERCOCET) 5-325 MG per tablet 1 tablet, Oral, Every 6 Hours PRN   • polyethylene glycol (MIRALAX) 17 GM/SCOOP powder Mix 17 g (1 capful) in liquid and take by mouth Daily As Needed (constipation).   • Risankizumab-rzaa (SKYRIZI, 150 MG  DOSE, SC) Subcutaneous, Once every 3 months    • sennosides-docusate (PERICOLACE) 8.6-50 MG per tablet Take 2 tablets by mouth Daily   • sertraline (ZOLOFT) 100 mg, Oral, Daily   • Torsemide 40 mg, Oral, Nightly   • Turmeric 500 MG capsule 1 capsule, Oral, 2 Times Daily       The following portions of the patient's history were reviewed and updated as appropriate: allergies, current medications, past family history, past medical history, past social history, past surgical history, and problem list.    Objective   Vital Signs:   /64   Pulse 56   Temp 98 °F (36.7 °C) (Temporal)   Wt 70.2 kg (154 lb 12.8 oz)   SpO2 96%   BMI 24.99 kg/m²     Wt Readings from Last 3 Encounters:   04/06/23 70.2 kg (154 lb 12.8 oz)   04/05/23 69.5 kg (153 lb 3.5 oz)   03/27/23 69.1 kg (152 lb 5.4 oz)     BP Readings from Last 3 Encounters:   04/06/23 160/64   04/05/23 121/46   03/27/23 156/77     Physical Exam  Vitals reviewed.   Constitutional:       Appearance: Normal appearance. He is well-developed.   HENT:      Head: Normocephalic and atraumatic.      Mouth/Throat:      Pharynx: No oropharyngeal exudate.   Eyes:      Conjunctiva/sclera: Conjunctivae normal.      Pupils: Pupils are equal, round, and reactive to light.   Cardiovascular:      Rate and Rhythm: Normal rate and regular rhythm.      Heart sounds: No murmur heard.    No friction rub. No gallop.   Pulmonary:      Effort: Pulmonary effort is normal. No prolonged expiration.      Breath sounds: Normal breath sounds. No decreased air movement. No wheezing or rhonchi.      Comments: Patient reports slight discomfort with breathing  Abdominal:          Comments: Peritoneal dialysis catheter present.     Eccymosis surrounding dialysis catheter, no redness or warmth. No hardness or palpable mass.     Pinpoint tenderness on right lower ribs, no eccymosis or visual abnormalities on ribs   Skin:     General: Skin is warm and dry.   Neurological:      Mental Status: He is  alert and oriented to person, place, and time.   Psychiatric:         Mood and Affect: Affect normal.          Result Review :  The following data was reviewed by: BECKY Hernandez on 04/06/2023:      Common labs    Common Labs 3/7/23 3/7/23 3/7/23 3/7/23 3/27/23 3/27/23 4/5/23    1107 1107 1615 1615 1026 1026    Glucose  91    73    BUN  60 (A)    72 (A)    Creatinine  5.30 (A)    5.61 (A)    Sodium  144    140    Potassium  4.3    4.7    Chloride  107    106    Calcium  9.9    9.0    Albumin  4.2        WBC 6.31    6.55     Hemoglobin 10.3 (A)  9.7 (A)  9.5 (A)  7.8 (A)   Hematocrit 31.1 (A)  31.0 (A)  30.0 (A)  24.4 (A)   Platelets 117 (A)    107 (A)     Hemoglobin A1C    6.40 (A)      (A) Abnormal value       Comments are available for some flowsheets but are not being displayed.             Lab Results (last 72 hours)     Procedure Component Value Units Date/Time    Hemoglobin & Hematocrit, Blood [[id number]]  (Abnormal) Collected: 04/05/23 1518    Specimen: Blood Updated: 04/05/23 1530     Hemoglobin 7.8 g/dL      Hematocrit 24.4 %            No Images in the past 120 days found..    Lab Results   Component Value Date    COVID19 NOT DETECTED 05/26/2020    INR 1.03 08/11/2022    BILIRUBINUR Negative 03/31/2023    POCGLU 86 03/27/2023       Procedures        Assessment and Plan   Diagnoses and all orders for this visit:    1. Rib pain on right side (Primary)  Comments:  Xrays obtained in office. Discussed if rib is fractured treatment includes rest, ice, NSAIDs. If difficulty breathing develops patient needs immediate eval    2. Fall, initial encounter  Comments:  Stumbled on uneven ground. Did not hit head. Xrays obtained to rule out rib fracture           There are no discontinued medications.       Follow Up   Return if symptoms worsen or fail to improve.  Patient was given instructions and counseling regarding his condition or for health maintenance advice. Please see specific information pulled into the  AVS if appropriate.       Shefali Flynn, APRN  04/06/23  14:35 EDT

## 2023-04-11 ENCOUNTER — OFFICE VISIT (OUTPATIENT)
Dept: SURGERY | Facility: CLINIC | Age: 78
End: 2023-04-11
Payer: MEDICARE

## 2023-04-11 VITALS — SYSTOLIC BLOOD PRESSURE: 168 MMHG | DIASTOLIC BLOOD PRESSURE: 80 MMHG

## 2023-04-11 DIAGNOSIS — Z09 POSTOP CHECK: Primary | ICD-10-CM

## 2023-04-11 PROCEDURE — 1159F MED LIST DOCD IN RCRD: CPT | Performed by: SURGERY

## 2023-04-11 PROCEDURE — 1160F RVW MEDS BY RX/DR IN RCRD: CPT | Performed by: SURGERY

## 2023-04-11 PROCEDURE — 99212 OFFICE O/P EST SF 10 MIN: CPT | Performed by: SURGERY

## 2023-04-11 PROCEDURE — 3079F DIAST BP 80-89 MM HG: CPT | Performed by: SURGERY

## 2023-04-11 PROCEDURE — 3077F SYST BP >= 140 MM HG: CPT | Performed by: SURGERY

## 2023-04-11 NOTE — PROGRESS NOTES
CC: Postop check    S: This is a 78 y.o. male who presents for a post-operative visit after undergoing a Laparoscopic Peritoneal dialysis catheter placement on 3/27/2023.     Patient reports he is doing well. Eating well without any significant nausea. Having good bowel function. No problems with constipation or diarrhea. No urinary complaints. Denies fever. Ambulating well and slowly returning to normal activities. Has been following with the PD nurse and catheter has been flushed without any problems    O:    AOx3, NAD  soft, nontender, nondistended, no masses or organomegaly  Incisions are healing well without any erythema or signs of infection. No signs of hernia. The catheter exit site is clean, dry and intact.      Assessment and plan:     The patient is a very pleasant 78 y.o. male s/p laparoscopic peritoneal dialysis catheter placement.  Patient is doing fine and does not have any specific complaints other than mild incisional pain.     - The patient was advised to continue to refrain from showering for 3 weeks but can have sponge bath  - continued to follow with the peritoneal dialysis catheter nurse for catheter flushes and training  -  can start using the catheter at 3 weeks from surgery      I advised the patient to continue to refrain from doing any heavy lifting of more than 15 pounds for a total of 6 weeks.  Patient may start doing an aerobic type exercise in 4 weeks after surgery.    Patient was given time to ask questions and all of them were answered appropriately.  The patient verbalized understanding and agreed with the plan.    he will follow-up at our office on a prn basis unless there are any problems.    Jose Berry MD, FACS  General, Minimally Invasive and Endoscopic Surgery  [facility], [place], operated by [facility]    [address], [address]  [place], [place], [address]  P: [phone]  F: [phone]    Calm

## 2023-04-24 ENCOUNTER — HOSPITAL ENCOUNTER (EMERGENCY)
Facility: HOSPITAL | Age: 78
Discharge: HOME | End: 2023-04-24
Attending: EMERGENCY MEDICINE | Admitting: EMERGENCY MEDICINE
Payer: MEDICARE

## 2023-04-24 VITALS
DIASTOLIC BLOOD PRESSURE: 63 MMHG | TEMPERATURE: 97.9 F | BODY MASS INDEX: 22.99 KG/M2 | WEIGHT: 143.08 LBS | HEIGHT: 66 IN | RESPIRATION RATE: 13 BRPM | OXYGEN SATURATION: 98 % | SYSTOLIC BLOOD PRESSURE: 126 MMHG | HEART RATE: 61 BPM

## 2023-04-24 DIAGNOSIS — R89.9 ABNORMAL LABORATORY TEST RESULT: Primary | ICD-10-CM

## 2023-04-24 LAB
ALBUMIN SERPL-MCNC: 3.9 G/DL (ref 3.5–5.2)
ALBUMIN/GLOB SERPL: 1.2 G/DL
ALP SERPL-CCNC: 104 U/L (ref 39–117)
ALT SERPL W P-5'-P-CCNC: 18 U/L (ref 1–41)
ANION GAP SERPL CALCULATED.3IONS-SCNC: 13.1 MMOL/L (ref 5–15)
AST SERPL-CCNC: 29 U/L (ref 1–40)
BASOPHILS # BLD AUTO: 0.02 10*3/MM3 (ref 0–0.2)
BASOPHILS NFR BLD AUTO: 0.3 % (ref 0–1.5)
BILIRUB SERPL-MCNC: 0.4 MG/DL (ref 0–1.2)
BUN SERPL-MCNC: 91 MG/DL (ref 8–23)
BUN/CREAT SERPL: 17.1 (ref 7–25)
CALCIUM SPEC-SCNC: 9.8 MG/DL (ref 8.6–10.5)
CHLORIDE SERPL-SCNC: 98 MMOL/L (ref 98–107)
CO2 SERPL-SCNC: 24.9 MMOL/L (ref 22–29)
CREAT SERPL-MCNC: 5.32 MG/DL (ref 0.76–1.27)
DEPRECATED RDW RBC AUTO: 50.6 FL (ref 37–54)
EGFRCR SERPLBLD CKD-EPI 2021: 10.4 ML/MIN/1.73
EOSINOPHIL # BLD AUTO: 0.24 10*3/MM3 (ref 0–0.4)
EOSINOPHIL NFR BLD AUTO: 4.1 % (ref 0.3–6.2)
ERYTHROCYTE [DISTWIDTH] IN BLOOD BY AUTOMATED COUNT: 15 % (ref 12.3–15.4)
GLOBULIN UR ELPH-MCNC: 3.2 GM/DL
GLUCOSE SERPL-MCNC: 229 MG/DL (ref 65–99)
HCT VFR BLD AUTO: 30.3 % (ref 37.5–51)
HGB BLD-MCNC: 9.6 G/DL (ref 13–17.7)
HOLD SPECIMEN: NORMAL
HOLD SPECIMEN: NORMAL
IMM GRANULOCYTES # BLD AUTO: 0.02 10*3/MM3 (ref 0–0.05)
IMM GRANULOCYTES NFR BLD AUTO: 0.3 % (ref 0–0.5)
IRON 24H UR-MRATE: 87 MCG/DL (ref 59–158)
LYMPHOCYTES # BLD AUTO: 1.69 10*3/MM3 (ref 0.7–3.1)
LYMPHOCYTES NFR BLD AUTO: 29.2 % (ref 19.6–45.3)
MAGNESIUM SERPL-MCNC: 1.7 MG/DL (ref 1.6–2.4)
MCH RBC QN AUTO: 29.5 PG (ref 26.6–33)
MCHC RBC AUTO-ENTMCNC: 31.7 G/DL (ref 31.5–35.7)
MCV RBC AUTO: 93.2 FL (ref 79–97)
MONOCYTES # BLD AUTO: 0.36 10*3/MM3 (ref 0.1–0.9)
MONOCYTES NFR BLD AUTO: 6.2 % (ref 5–12)
NEUTROPHILS NFR BLD AUTO: 3.46 10*3/MM3 (ref 1.7–7)
NEUTROPHILS NFR BLD AUTO: 59.9 % (ref 42.7–76)
NRBC BLD AUTO-RTO: 0 /100 WBC (ref 0–0.2)
PLATELET # BLD AUTO: 154 10*3/MM3 (ref 140–450)
PMV BLD AUTO: 10.6 FL (ref 6–12)
POTASSIUM SERPL-SCNC: 4 MMOL/L (ref 3.5–5.2)
PROT SERPL-MCNC: 7.1 G/DL (ref 6–8.5)
QT INTERVAL: 467 MS
RBC # BLD AUTO: 3.25 10*6/MM3 (ref 4.14–5.8)
SODIUM SERPL-SCNC: 136 MMOL/L (ref 136–145)
WBC NRBC COR # BLD: 5.79 10*3/MM3 (ref 3.4–10.8)
WHOLE BLOOD HOLD COAG: NORMAL
WHOLE BLOOD HOLD SPECIMEN: NORMAL

## 2023-04-24 PROCEDURE — 93005 ELECTROCARDIOGRAM TRACING: CPT

## 2023-04-24 PROCEDURE — 99283 EMERGENCY DEPT VISIT LOW MDM: CPT

## 2023-04-24 PROCEDURE — 83540 ASSAY OF IRON: CPT | Performed by: EMERGENCY MEDICINE

## 2023-04-24 PROCEDURE — 85025 COMPLETE CBC W/AUTO DIFF WBC: CPT

## 2023-04-24 PROCEDURE — 80053 COMPREHEN METABOLIC PANEL: CPT

## 2023-04-24 PROCEDURE — 93005 ELECTROCARDIOGRAM TRACING: CPT | Performed by: EMERGENCY MEDICINE

## 2023-04-24 PROCEDURE — 83735 ASSAY OF MAGNESIUM: CPT

## 2023-04-24 NOTE — ED PROVIDER NOTES
Time: 12:23 PM EDT  Date of encounter:  4/24/2023  Independent Historian/Clinical History and Information was obtained by: Patient and Chart  Chief Complaint: abnormal lab  History is limited by: N/A  Room number: 10/10     History of Present Illness:  HPI  Patient is a 78 y.o. year old male who presents to the Emergency Department via private car for evaluation of abnormal lab. Patient has no one at bedside on initial evaluation. Patient has a medical history of thrombocytopenia, monoclonal gammopathy of unknown significance (MGUS), end-stage renal disease (ESRD) on dialysis, hyperlipidemia (HLD), hypertension (HTN), hypothyroidism and type 2 diabetes mellitus (T2DM). Patient has a surgical history of peritoneal dialysis catheter (03/27/2023) and appendectomy. Patient has a social history of current alcohol user and former smoker.    Patient is requesting Emergency Department evaluation of abnormal laboratory result with no symptoms at the present time. Patient laboratory studies were completed (04/24/2023). Patient states they are in no pain and rates their pain level 0 out of 10 at rest and with movement or activity. Patient states no modifying factors. Patient denies all other symptoms. All other systems reviews are negative.      Patient Care Team  Primary Care Provider: Janna Mo MD    Past Medical History:  No Known Allergies  Past Medical History:   Diagnosis Date   • Anemia    • CKD (chronic kidney disease), stage IV    • Diabetes    • Gout    • High cholesterol    • HL (hearing loss)    • Hyperkalemia    • Hypertension    • Hypothyroidism    • Psoriasis    • Vitiligo      Past Surgical History:   Procedure Laterality Date   • ANKLE SURGERY  11/1990   • APPENDECTOMY N/A 1954   • COLONOSCOPY N/A 06/2022    [facility]   • HIP BIPOLAR REPLACEMENT Right 01/2000   • INSERTION PERITONEAL DIALYSIS CATHETER N/A 3/27/2023    Procedure: LAPAROSCOPIC INSERTION PERITONEAL DIALYSIS CATHETER, LAPAROSCOPIC  OMENTOPEXY WITH LYSIS OF ADHESIONS;  Surgeon: Jose Berry MD;  Location: [facility] MAIN OR;  Service: General;  Laterality: N/A;   • RENAL BIOPSY Left 07/15/2022     Family History   Problem Relation Age of Onset   • Diabetes Mother    • Heart disease Father    • Cancer Sister 35   • Diabetes Sister    • Diabetes Brother    • Heart disease Paternal Uncle    • Malig Hyperthermia Neg Hx        Home Medications:  Prior to Admission medications    Medication Sig Start Date End Date Taking? Authorizing Provider   allopurinol (ZYLOPRIM) 300 MG tablet Take 1 tablet by mouth Every Night. 1/5/23   Serena Matute MD   ascorbic acid (VITAMIN C) 1000 MG tablet Take 1 tablet by mouth Every Night.    Serena aMtute MD   atorvastatin (Lipitor) 40 MG tablet Take 1 tablet by mouth Daily.  Patient taking differently: Take 1 tablet by mouth Every Night. 9/16/22   Aylin Nicole APRN   calcium acetate (PHOS BINDER,) 667 MG capsule capsule Take 1 tablet by mouth 3 (Three) Times a Day. 9/21/22 9/21/23  Serena Matute MD   carvedilol (COREG) 12.5 MG tablet Take 1 tablet by mouth 2 (Two) Times a Day. 3/22/23   Logan Nicole MD   clobetasol (TEMOVATE) 0.05 % ointment  9/16/22   Serena Matute MD   Euthyrox 75 MCG tablet Take 1 tablet by mouth once daily  Patient taking differently: Take 1 tablet by mouth Every Night. 4/8/22   Beba Grant MD   Insulin Glargine (LANTUS SOLOSTAR) 100 UNIT/ML injection pen Inject 14 Units under the skin into the appropriate area as directed Daily.  Patient taking differently: Inject 25 Units under the skin into the appropriate area as directed Every Night. 9/20/22   Janna Mo MD   Omega-3 Fatty Acids (fish oil) 1000 MG capsule capsule Take 1 capsule by mouth Daily With Breakfast.    Serena Matute MD   Risankizumab-rzaa (SKYRIZI, 150 MG DOSE, SC) Inject  under the skin into the appropriate area as directed. Once every 3 months     "Serena Matute MD   sennosides-docusate (PERICOLACE) 8.6-50 MG per tablet Take 2 tablets by mouth Daily 3/27/23 9/23/23  Jose Berry MD   sertraline (ZOLOFT) 100 MG tablet Take 1 tablet by mouth Daily.  Patient taking differently: Take 1 tablet by mouth Every Night. 22   Janna Mo MD   Torsemide 40 MG tablet Take 40 mg by mouth Every Night. 22   Serena Matute MD   Turmeric 500 MG capsule Take 1 capsule by mouth 2 (Two) Times a Day.    Serena Matute MD        Social History:  Social History     Tobacco Use   • Smoking status: Former     Packs/day: 1.00     Years: 10.00     Pack years: 10.00     Types: Cigarettes     Start date:      Quit date:      Years since quittin.3     Passive exposure: Past   • Smokeless tobacco: Never   • Tobacco comments:     quit 25 years ago, chews nicotine gum   Vaping Use   • Vaping Use: Never used   Substance Use Topics   • Alcohol use: Yes     Comment: 1 DRINK/DAY   • Drug use: Never       Review of Systems:   Review of Systems   Constitutional: Negative.    HENT: Negative.    Eyes: Negative.    Respiratory: Negative.    Cardiovascular: Negative.    Gastrointestinal: Negative.    Endocrine: Negative.    Genitourinary: Negative.    Musculoskeletal: Negative.    Skin: Negative.    Allergic/Immunologic: Negative.    Neurological: Negative.    Hematological: Negative.    Psychiatric/Behavioral: Negative.    All other systems reviewed and are negative.         Physical Exam:   /63 (BP Location: Right arm, Patient Position: Lying)   Pulse 61   Temp 97.9 °F (36.6 °C) (Oral)   Resp 13   Ht 167.6 cm (66\")   Wt 64.9 kg (143 lb 1.3 oz)   SpO2 98%   BMI 23.09 kg/m²     Physical Exam  Vitals and nursing note reviewed.   Constitutional:       General: He is not in acute distress.     Appearance: Normal appearance. He is not toxic-appearing.   HENT:      Head: Normocephalic and atraumatic.      Jaw: There is normal jaw " occlusion.   Eyes:      General: Lids are normal.      Extraocular Movements: Extraocular movements intact.      Conjunctiva/sclera: Conjunctivae normal.      Pupils: Pupils are equal, round, and reactive to light.   Cardiovascular:      Rate and Rhythm: Normal rate and regular rhythm.      Pulses: Normal pulses.      Heart sounds: Normal heart sounds.   Pulmonary:      Effort: Pulmonary effort is normal. No respiratory distress.      Breath sounds: Normal breath sounds. No wheezing or rhonchi.   Abdominal:      General: Abdomen is flat.      Palpations: Abdomen is soft.      Tenderness: There is no abdominal tenderness. There is no guarding or rebound.   Musculoskeletal:         General: Normal range of motion.      Cervical back: Normal range of motion and neck supple.      Right lower leg: No edema.      Left lower leg: No edema.   Skin:     General: Skin is warm and dry.   Neurological:      Mental Status: He is alert and oriented to person, place, and time. Mental status is at baseline.   Psychiatric:         Mood and Affect: Mood normal.                Procedures:  Procedures    Medical Decision Making:    Comorbidities that affect care:    thrombocytopenia, monoclonal gammopathy of unknown significance (MGUS), end-stage renal disease (ESRD) on dialysis, hyperlipidemia (HLD), hypertension (HTN), hypothyroidism and type 2 diabetes mellitus (T2DM), former smoker    External Notes reviewed:    Previous Clinic Note: Family medicine office visit for fall evaluation and rib pain    The following orders were placed and all results were independently analyzed by me:  Orders Placed This Encounter   Procedures   • Comprehensive Metabolic Panel   • [place] Draw   • Magnesium   • CBC Auto Differential   • Iron   • ECG 12 Lead Electrolyte Imbalance   • CBC & Differential   • Green Top (Gel)   • Lavender Top   • Gold Top - SST   • Light Blue Top       Medications Given in the Emergency Department:  Medications - No data to  display    ED Course:       Labs:  Lab Results (last 24 hours)     Procedure Component Value Units Date/Time    CBC & Differential [[id number]]  (Abnormal) Collected: 04/24/23 1130    Specimen: Blood Updated: 04/24/23 1143    Narrative:      The following orders were created for panel order CBC & Differential.  Procedure                               Abnormality         Status                     ---------                               -----------         ------                     CBC Auto Differential[[id number]]        Abnormal            Final result                 Please view results for these tests on the individual orders.    Comprehensive Metabolic Panel [[id number]]  (Abnormal) Collected: 04/24/23 1130    Specimen: Blood Updated: 04/24/23 1203     Glucose 229 mg/dL      BUN 91 mg/dL      Creatinine 5.32 mg/dL      Sodium 136 mmol/L      Potassium 4.0 mmol/L      Chloride 98 mmol/L      CO2 24.9 mmol/L      Calcium 9.8 mg/dL      Total Protein 7.1 g/dL      Albumin 3.9 g/dL      ALT (SGPT) 18 U/L      AST (SGOT) 29 U/L      Alkaline Phosphatase 104 U/L      Total Bilirubin 0.4 mg/dL      Globulin 3.2 gm/dL      A/G Ratio 1.2 g/dL      BUN/Creatinine Ratio 17.1     Anion Gap 13.1 mmol/L      eGFR 10.4 mL/min/1.73      Comment: <15 Indicative of kidney failure       Narrative:      GFR Normal >60  Chronic Kidney Disease <60  Kidney Failure <15    The GFR formula is only valid for adults with stable renal function between ages 18 and 70.    Magnesium [[id number]]  (Normal) Collected: 04/24/23 1130    Specimen: Blood Updated: 04/24/23 1203     Magnesium 1.7 mg/dL     CBC Auto Differential [[id number]]  (Abnormal) Collected: 04/24/23 1130    Specimen: Blood Updated: 04/24/23 1143     WBC 5.79 10*3/mm3      RBC 3.25 10*6/mm3      Hemoglobin 9.6 g/dL      Hematocrit 30.3 %      MCV 93.2 fL      MCH 29.5 pg      MCHC 31.7 g/dL      RDW 15.0 %      RDW-SD 50.6 fl      MPV 10.6 fL      Platelets 154 10*3/mm3       Neutrophil % 59.9 %      Lymphocyte % 29.2 %      Monocyte % 6.2 %      Eosinophil % 4.1 %      Basophil % 0.3 %      Immature Grans % 0.3 %      Neutrophils, Absolute 3.46 10*3/mm3      Lymphocytes, Absolute 1.69 10*3/mm3      Monocytes, Absolute 0.36 10*3/mm3      Eosinophils, Absolute 0.24 10*3/mm3      Basophils, Absolute 0.02 10*3/mm3      Immature Grans, Absolute 0.02 10*3/mm3      nRBC 0.0 /100 WBC     Iron [[id number]]  (Normal) Collected: 04/24/23 1130    Specimen: Blood Updated: 04/24/23 1222     Iron 87 mcg/dL            Imaging:  No Radiology Exams Resulted Within Past 24 Hours    Differential Diagnosis and Discussion:    Metabolic: Differential diagnosis includes but is not limited to hypertension, hyperglycemia, hyperkalemia, hypocalcemia, metabolic acidosis, hypokalemia, hypoglycemia, malnutrition, hypothyroidism, hyperthyroidism, and adrenal insufficiency.     All labs were reviewed and interpreted by me.  EKG was interpreted by me.    MDM  Number of Diagnoses or Management Options  Abnormal laboratory test result  Diagnosis management comments: In summary this is a 78-year-old male who presents to the emerged department for evaluation after receiving phone call due to abnormal labs.  Reportedly had elevated potassium and very low magnesium.  Patient has no complaints.  He did recently start peritoneal dialysis.  On today's laboratory evaluation CMP independently reviewed by me and shows no critical abnormalities.  CBC independently reviewed by me and shows no critical abnormalities.  Very strict return to ER and follow-up instructions have been provided to the patient.             Patient Care Considerations:    CONSULT: I considered consulting Nephrology, however Laboratory abnormalities were incorrect    Consultants/Shared Management Plan:    None    Social Determinants of Health:    Patient is independent, reliable, and has access to care.     Disposition and Care Coordination:    Discharged: I  considered escalation of care by admitting this patient for observation, however the patient has improved and is suitable and  stable for discharge.    I have explained the patient´s condition, diagnoses and treatment plan based on the information available to me at this time. I have answered questions and addressed any concerns. The patient has a good  understanding of the patient´s diagnosis, condition, and treatment plan as can be expected at this point. The vital signs have been stable. The patient´s condition is stable and appropriate for discharge from the emergency department.      The patient will pursue further outpatient evaluation with the primary care physician or other designated or consulting physician as outlined in the discharge instructions. They are agreeable to this plan of care and follow-up instructions have been explained in detail. The patient has received these instructions in written format and have expressed an understanding of the discharge instructions. The patient is aware that any significant change in condition or worsening of symptoms should prompt an immediate return to this or the closest emergency department or call to 911.  I have explained discharge medications and the need for follow up with the patient/caretakers. This was also printed in the discharge instructions. Patient was discharged with the following medications and follow up:      Medication List      Changed    atorvastatin 40 MG tablet  Commonly known as: Lipitor  Take 1 tablet by mouth Daily.  What changed: when to take this     Euthyrox 75 MCG tablet  Generic drug: levothyroxine  Take 1 tablet by mouth once daily  What changed:   · how much to take  · when to take this     Insulin Glargine 100 UNIT/ML injection pen  Commonly known as: LANTUS SOLOSTAR  Inject 14 Units under the skin into the appropriate area as directed Daily.  What changed:   · how much to take  · when to take this     sertraline 100 MG  tablet  Commonly known as: ZOLOFT  Take 1 tablet by mouth Daily.  What changed: when to take this         Chely, Janna Johnson MD  [address]  [facility] [address]  [phone]    In 1 week      Coreen Castro MD  [address]  [address]  [facility] [address]  [phone]    Call          Final diagnoses:   Abnormal laboratory test result        ED Disposition     ED Disposition   Discharge    Condition   Stable    Comment   --             This medical record created using voice recognition software.    Documentation assistance provided by Theresa Villarreal acting a scribe for Rob Shrestha MD. Information recorded by the scribe was verified and validated at my direction.     Theresa Villarreal  04/24/23 1230       Fady Rivas MD  04/24/23 [id number]

## 2023-05-17 LAB — QT INTERVAL: 467 MS

## 2023-06-06 ENCOUNTER — OFFICE VISIT (OUTPATIENT)
Dept: INTERNAL MEDICINE | Facility: CLINIC | Age: 78
End: 2023-06-06
Payer: MEDICARE

## 2023-06-06 VITALS
TEMPERATURE: 99.1 F | SYSTOLIC BLOOD PRESSURE: 108 MMHG | HEART RATE: 61 BPM | WEIGHT: 155.8 LBS | OXYGEN SATURATION: 98 % | BODY MASS INDEX: 25.04 KG/M2 | HEIGHT: 66 IN | DIASTOLIC BLOOD PRESSURE: 58 MMHG

## 2023-06-06 DIAGNOSIS — M1A.9XX0 CHRONIC GOUT WITHOUT TOPHUS, UNSPECIFIED CAUSE, UNSPECIFIED SITE: Primary | ICD-10-CM

## 2023-06-06 LAB — URATE SERPL-MCNC: 2.4 MG/DL (ref 3.4–7)

## 2023-06-06 PROCEDURE — 84550 ASSAY OF BLOOD/URIC ACID: CPT | Performed by: PHYSICIAN ASSISTANT

## 2023-06-06 PROCEDURE — 3074F SYST BP LT 130 MM HG: CPT | Performed by: PHYSICIAN ASSISTANT

## 2023-06-06 PROCEDURE — 1159F MED LIST DOCD IN RCRD: CPT | Performed by: PHYSICIAN ASSISTANT

## 2023-06-06 PROCEDURE — 1160F RVW MEDS BY RX/DR IN RCRD: CPT | Performed by: PHYSICIAN ASSISTANT

## 2023-06-06 PROCEDURE — 36415 COLL VENOUS BLD VENIPUNCTURE: CPT | Performed by: PHYSICIAN ASSISTANT

## 2023-06-06 PROCEDURE — 99213 OFFICE O/P EST LOW 20 MIN: CPT | Performed by: PHYSICIAN ASSISTANT

## 2023-06-06 PROCEDURE — 3078F DIAST BP <80 MM HG: CPT | Performed by: PHYSICIAN ASSISTANT

## 2023-06-06 RX ORDER — GENTAMICIN SULFATE 1 MG/G
CREAM TOPICAL
COMMUNITY
Start: 2023-04-19

## 2023-06-06 NOTE — ASSESSMENT & PLAN NOTE
We will check uric acid levels today to see if swelling is secondary to gout or fluid buildup with dialysis.

## 2023-06-06 NOTE — PROGRESS NOTES
"Chief Complaint  Leg Swelling (Right leg and ankle causing pain. No injury. Daughter not sure if it is gout related or if pt is retaining fluid ) and Gout (Hx of )    Subjective          Nelson Wang presents to [facility]    Right ankle swelling-patient noticed some swelling in his right ankle several days ago.  The swelling has improved within the last couple days.  Patient's daughter is with him today who is concerned of either gout or possible fluid overload.  Patient did have a different concentration of dialysis with slightly elevated than normal and they are uncertain whether this change could have influenced the swelling or not.  Otherwise patient is feeling well.    Objective   Vital Signs:   /58   Pulse 61   Temp 99.1 °F (37.3 °C) (Temporal)   Ht 167.6 cm (66\")   Wt 70.7 kg (155 lb 12.8 oz)   SpO2 98%   BMI 25.15 kg/m²     Physical Exam  Vitals reviewed.   Constitutional:       Appearance: Normal appearance. He is well-developed.   HENT:      Head: Normocephalic and atraumatic.   Eyes:      Conjunctiva/sclera: Conjunctivae normal.      Pupils: Pupils are equal, round, and reactive to light.   Cardiovascular:      Rate and Rhythm: Normal rate and regular rhythm.      Heart sounds: No murmur heard.    No friction rub. No gallop.   Pulmonary:      Effort: Pulmonary effort is normal.      Breath sounds: Normal breath sounds. No wheezing or rhonchi.   Musculoskeletal:      Right lower leg: Edema (1+ ankle) present.   Skin:     General: Skin is warm and dry.   Neurological:      Mental Status: He is alert and oriented to person, place, and time.      Cranial Nerves: No cranial nerve deficit.   Psychiatric:         Mood and Affect: Mood and affect normal.         Behavior: Behavior normal.         Thought Content: Thought content normal.         Judgment: Judgment normal.      Result Review :          Procedures      Assessment and Plan    Diagnoses " and all orders for this visit:    1. Chronic gout without tophus, unspecified cause, unspecified site (Primary)  Assessment & Plan:  We will check uric acid levels today to see if swelling is secondary to gout or fluid buildup with dialysis.    Orders:  -     Uric acid              Follow Up   No follow-ups on file.  Patient was given instructions and counseling regarding his condition or for health maintenance advice. Please see specific information pulled into the AVS if appropriate.

## 2023-07-22 ENCOUNTER — HOSPITAL ENCOUNTER (OUTPATIENT)
Facility: HOSPITAL | Age: 78
Discharge: HOME | End: 2023-07-23
Attending: EMERGENCY MEDICINE | Admitting: STUDENT IN AN ORGANIZED HEALTH CARE EDUCATION/TRAINING PROGRAM
Payer: MEDICARE

## 2023-07-22 DIAGNOSIS — N18.6 END STAGE RENAL DISEASE ON DIALYSIS: ICD-10-CM

## 2023-07-22 DIAGNOSIS — T85.611A PERITONEAL DIALYSIS CATHETER DYSFUNCTION, INITIAL ENCOUNTER: Primary | ICD-10-CM

## 2023-07-22 DIAGNOSIS — Z99.2 END STAGE RENAL DISEASE ON DIALYSIS: ICD-10-CM

## 2023-07-22 LAB
ALBUMIN SERPL-MCNC: 3.4 G/DL (ref 3.5–5.2)
ALBUMIN/GLOB SERPL: 1.4 G/DL
ALP SERPL-CCNC: 70 U/L (ref 39–117)
ALT SERPL W P-5'-P-CCNC: 20 U/L (ref 1–41)
ANION GAP SERPL CALCULATED.3IONS-SCNC: 11 MMOL/L (ref 5–15)
AST SERPL-CCNC: 25 U/L (ref 1–40)
BASOPHILS # BLD AUTO: 0.02 10*3/MM3 (ref 0–0.2)
BASOPHILS NFR BLD AUTO: 0.3 % (ref 0–1.5)
BILIRUB SERPL-MCNC: 0.3 MG/DL (ref 0–1.2)
BUN SERPL-MCNC: 66 MG/DL (ref 8–23)
BUN/CREAT SERPL: 10.5 (ref 7–25)
CALCIUM SPEC-SCNC: 8.6 MG/DL (ref 8.6–10.5)
CHLORIDE SERPL-SCNC: 104 MMOL/L (ref 98–107)
CO2 SERPL-SCNC: 26 MMOL/L (ref 22–29)
CREAT SERPL-MCNC: 6.26 MG/DL (ref 0.76–1.27)
DEPRECATED RDW RBC AUTO: 53.2 FL (ref 37–54)
EGFRCR SERPLBLD CKD-EPI 2021: 8.6 ML/MIN/1.73
EOSINOPHIL # BLD AUTO: 0.14 10*3/MM3 (ref 0–0.4)
EOSINOPHIL NFR BLD AUTO: 2.3 % (ref 0.3–6.2)
ERYTHROCYTE [DISTWIDTH] IN BLOOD BY AUTOMATED COUNT: 15.4 % (ref 12.3–15.4)
GLOBULIN UR ELPH-MCNC: 2.5 GM/DL
GLUCOSE BLDC GLUCOMTR-MCNC: 164 MG/DL (ref 70–130)
GLUCOSE SERPL-MCNC: 204 MG/DL (ref 65–99)
HCT VFR BLD AUTO: 34.6 % (ref 37.5–51)
HGB BLD-MCNC: 11.1 G/DL (ref 13–17.7)
IMM GRANULOCYTES # BLD AUTO: 0.03 10*3/MM3 (ref 0–0.05)
IMM GRANULOCYTES NFR BLD AUTO: 0.5 % (ref 0–0.5)
INR PPP: 1.05 (ref 0.9–1.1)
LYMPHOCYTES # BLD AUTO: 1.94 10*3/MM3 (ref 0.7–3.1)
LYMPHOCYTES NFR BLD AUTO: 31.3 % (ref 19.6–45.3)
MCH RBC QN AUTO: 30.4 PG (ref 26.6–33)
MCHC RBC AUTO-ENTMCNC: 32.1 G/DL (ref 31.5–35.7)
MCV RBC AUTO: 94.8 FL (ref 79–97)
MONOCYTES # BLD AUTO: 0.55 10*3/MM3 (ref 0.1–0.9)
MONOCYTES NFR BLD AUTO: 8.9 % (ref 5–12)
NEUTROPHILS NFR BLD AUTO: 3.52 10*3/MM3 (ref 1.7–7)
NEUTROPHILS NFR BLD AUTO: 56.7 % (ref 42.7–76)
NRBC BLD AUTO-RTO: 0.2 /100 WBC (ref 0–0.2)
PLATELET # BLD AUTO: 112 10*3/MM3 (ref 140–450)
PMV BLD AUTO: 11.5 FL (ref 6–12)
POTASSIUM SERPL-SCNC: 4.4 MMOL/L (ref 3.5–5.2)
PROT SERPL-MCNC: 5.9 G/DL (ref 6–8.5)
PROTHROMBIN TIME: 13.8 SECONDS (ref 11.7–14.2)
RBC # BLD AUTO: 3.65 10*6/MM3 (ref 4.14–5.8)
SODIUM SERPL-SCNC: 141 MMOL/L (ref 136–145)
WBC NRBC COR # BLD: 6.2 10*3/MM3 (ref 3.4–10.8)

## 2023-07-22 PROCEDURE — 80053 COMPREHEN METABOLIC PANEL: CPT | Performed by: EMERGENCY MEDICINE

## 2023-07-22 PROCEDURE — 99284 EMERGENCY DEPT VISIT MOD MDM: CPT

## 2023-07-22 PROCEDURE — 63710000001 INSULIN LISPRO (HUMAN) PER 5 UNITS: Performed by: NURSE PRACTITIONER

## 2023-07-22 PROCEDURE — 93005 ELECTROCARDIOGRAM TRACING: CPT | Performed by: EMERGENCY MEDICINE

## 2023-07-22 PROCEDURE — 85025 COMPLETE CBC W/AUTO DIFF WBC: CPT | Performed by: EMERGENCY MEDICINE

## 2023-07-22 PROCEDURE — G0378 HOSPITAL OBSERVATION PER HR: HCPCS

## 2023-07-22 PROCEDURE — 85610 PROTHROMBIN TIME: CPT | Performed by: EMERGENCY MEDICINE

## 2023-07-22 PROCEDURE — 82948 REAGENT STRIP/BLOOD GLUCOSE: CPT

## 2023-07-22 PROCEDURE — 63710000001 INSULIN GLARGINE PER 5 UNITS: Performed by: NURSE PRACTITIONER

## 2023-07-22 PROCEDURE — 93010 ELECTROCARDIOGRAM REPORT: CPT | Performed by: INTERNAL MEDICINE

## 2023-07-22 RX ORDER — IBUPROFEN 600 MG/1
1 TABLET ORAL
Status: DISCONTINUED | OUTPATIENT
Start: 2023-07-22 | End: 2023-07-23 | Stop reason: HOSPADM

## 2023-07-22 RX ORDER — ONDANSETRON 2 MG/ML
4 INJECTION INTRAMUSCULAR; INTRAVENOUS EVERY 6 HOURS PRN
Status: DISCONTINUED | OUTPATIENT
Start: 2023-07-22 | End: 2023-07-23 | Stop reason: HOSPADM

## 2023-07-22 RX ORDER — SODIUM CHLORIDE 0.9 % (FLUSH) 0.9 %
10 SYRINGE (ML) INJECTION EVERY 12 HOURS SCHEDULED
Status: DISCONTINUED | OUTPATIENT
Start: 2023-07-22 | End: 2023-07-23 | Stop reason: HOSPADM

## 2023-07-22 RX ORDER — SODIUM CHLORIDE 0.9 % (FLUSH) 0.9 %
10 SYRINGE (ML) INJECTION AS NEEDED
Status: DISCONTINUED | OUTPATIENT
Start: 2023-07-22 | End: 2023-07-23 | Stop reason: HOSPADM

## 2023-07-22 RX ORDER — CARVEDILOL 25 MG/1
25 TABLET ORAL 2 TIMES DAILY
Status: DISCONTINUED | OUTPATIENT
Start: 2023-07-22 | End: 2023-07-23 | Stop reason: HOSPADM

## 2023-07-22 RX ORDER — INSULIN LISPRO 100 [IU]/ML
2-9 INJECTION, SOLUTION INTRAVENOUS; SUBCUTANEOUS
Status: DISCONTINUED | OUTPATIENT
Start: 2023-07-22 | End: 2023-07-23 | Stop reason: HOSPADM

## 2023-07-22 RX ORDER — BISACODYL 10 MG
10 SUPPOSITORY, RECTAL RECTAL DAILY PRN
Status: DISCONTINUED | OUTPATIENT
Start: 2023-07-22 | End: 2023-07-23 | Stop reason: HOSPADM

## 2023-07-22 RX ORDER — ONDANSETRON 4 MG/1
4 TABLET, FILM COATED ORAL EVERY 6 HOURS PRN
Status: DISCONTINUED | OUTPATIENT
Start: 2023-07-22 | End: 2023-07-23 | Stop reason: HOSPADM

## 2023-07-22 RX ORDER — POLYETHYLENE GLYCOL 3350 17 G/17G
17 POWDER, FOR SOLUTION ORAL DAILY PRN
Status: DISCONTINUED | OUTPATIENT
Start: 2023-07-22 | End: 2023-07-23 | Stop reason: HOSPADM

## 2023-07-22 RX ORDER — DEXTROSE MONOHYDRATE 25 G/50ML
25 INJECTION, SOLUTION INTRAVENOUS
Status: DISCONTINUED | OUTPATIENT
Start: 2023-07-22 | End: 2023-07-23 | Stop reason: HOSPADM

## 2023-07-22 RX ORDER — AMOXICILLIN 250 MG
2 CAPSULE ORAL 2 TIMES DAILY
Status: DISCONTINUED | OUTPATIENT
Start: 2023-07-22 | End: 2023-07-23 | Stop reason: HOSPADM

## 2023-07-22 RX ORDER — BISACODYL 5 MG/1
5 TABLET, DELAYED RELEASE ORAL DAILY PRN
Status: DISCONTINUED | OUTPATIENT
Start: 2023-07-22 | End: 2023-07-23 | Stop reason: HOSPADM

## 2023-07-22 RX ORDER — LEVOTHYROXINE SODIUM 88 UG/1
176 TABLET ORAL
Status: DISCONTINUED | OUTPATIENT
Start: 2023-07-23 | End: 2023-07-23 | Stop reason: HOSPADM

## 2023-07-22 RX ORDER — SODIUM CHLORIDE 9 MG/ML
40 INJECTION, SOLUTION INTRAVENOUS AS NEEDED
Status: DISCONTINUED | OUTPATIENT
Start: 2023-07-22 | End: 2023-07-23 | Stop reason: HOSPADM

## 2023-07-22 RX ORDER — NITROGLYCERIN 0.4 MG/1
0.4 TABLET SUBLINGUAL
Status: DISCONTINUED | OUTPATIENT
Start: 2023-07-22 | End: 2023-07-23 | Stop reason: HOSPADM

## 2023-07-22 RX ORDER — TORSEMIDE 20 MG/1
40 TABLET ORAL DAILY
Status: DISCONTINUED | OUTPATIENT
Start: 2023-07-23 | End: 2023-07-23 | Stop reason: HOSPADM

## 2023-07-22 RX ORDER — ALLOPURINOL 100 MG/1
300 TABLET ORAL DAILY
Status: DISCONTINUED | OUTPATIENT
Start: 2023-07-23 | End: 2023-07-23 | Stop reason: HOSPADM

## 2023-07-22 RX ORDER — ATORVASTATIN CALCIUM 20 MG/1
40 TABLET, FILM COATED ORAL DAILY
Status: DISCONTINUED | OUTPATIENT
Start: 2023-07-23 | End: 2023-07-23 | Stop reason: HOSPADM

## 2023-07-22 RX ORDER — NICOTINE POLACRILEX 4 MG
15 LOZENGE BUCCAL
Status: DISCONTINUED | OUTPATIENT
Start: 2023-07-22 | End: 2023-07-23 | Stop reason: HOSPADM

## 2023-07-22 RX ADMIN — Medication 10 ML: at 20:53

## 2023-07-22 RX ADMIN — INSULIN GLARGINE 15 UNITS: 100 INJECTION, SOLUTION SUBCUTANEOUS at 21:45

## 2023-07-22 RX ADMIN — CARVEDILOL 25 MG: 25 TABLET, FILM COATED ORAL at 20:50

## 2023-07-22 RX ADMIN — SERTRALINE 100 MG: 50 TABLET, FILM COATED ORAL at 20:49

## 2023-07-22 RX ADMIN — INSULIN LISPRO 2 UNITS: 100 INJECTION, SOLUTION INTRAVENOUS; SUBCUTANEOUS at 21:45

## 2023-07-22 NOTE — ED NOTES
"..Nursing report ED to floor  Nelson Wang  78 y.o.  male    HPI :   Chief Complaint   Patient presents with    peritoneal cath problem       Admitting doctor:   Reynaldo Myrick MD    Admitting diagnosis:   The primary encounter diagnosis was Peritoneal dialysis catheter dysfunction, initial encounter. A diagnosis of End stage renal disease on dialysis was also pertinent to this visit.    Code status:   Current Code Status       Date Active Code Status Order ID Comments User Context       7/22/2023 1848 CPR (Attempt to Resuscitate) [id number]  Rosangela Montoya PA-C ED        Question Answer    Code Status (Patient has no pulse and is not breathing) CPR (Attempt to Resuscitate)    Medical Interventions (Patient has pulse or is breathing) Full Support    Release to patient Routine Release                    Allergies:   Patient has no known allergies.    Isolation:   No active isolations    Intake and Output  No intake or output data in the 24 hours ending 07/22/23 1909    Weight:       07/22/23  1606   Weight: 70.3 kg (155 lb)       Most recent vitals:   Vitals:    07/22/23 1606 07/22/23 1614 07/22/23 1646   BP:  157/75 158/80   Pulse: 66 63 66   Resp: 16     Temp: 97.8 °F (36.6 °C)     TempSrc: Tympanic     SpO2: 93% 92% 91%   Weight: 70.3 kg (155 lb)     Height: 167.6 cm (66\")         Active LDAs/IV Access:   Lines, Drains & Airways       Active LDAs       Name Placement date Placement time Site Days    Peripheral IV 07/22/23 1803 Right Antecubital 07/22/23 1803  Antecubital  less than 1    Peritoneal Dialysis Catheter 03/27/23  --  --  117                    Labs (abnormal labs have a star):   Labs Reviewed   COMPREHENSIVE METABOLIC PANEL - Abnormal; Notable for the following components:       Result Value    Glucose 204 (*)     BUN 66 (*)     Creatinine 6.26 (*)     Total Protein 5.9 (*)     Albumin 3.4 (*)     eGFR 8.6 (*)     All other components within normal limits    Narrative:     GFR Normal " >60  Chronic Kidney Disease <60  Kidney Failure <15    The GFR formula is only valid for adults with stable renal function between ages 18 and 70.   CBC WITH AUTO DIFFERENTIAL - Abnormal; Notable for the following components:    RBC 3.65 (*)     Hemoglobin 11.1 (*)     Hematocrit 34.6 (*)     Platelets 112 (*)     All other components within normal limits   PROTIME-INR - Normal   CBC AND DIFFERENTIAL    Narrative:     The following orders were created for panel order CBC & Differential.  Procedure                               Abnormality         Status                     ---------                               -----------         ------                     CBC Auto Differential[[id number]]        Abnormal            Final result                 Please view results for these tests on the individual orders.       EKG:   ECG 12 Lead Electrolyte Imbalance   Preliminary Result   HEART RATE= 62  bpm   RR Interval= 968  ms   ND Interval= 228  ms   P Horizontal Axis= -4  deg   P Front Axis= 60  deg   QRSD Interval= 89  ms   QT Interval= 449  ms   QRS Axis= -21  deg   T Wave Axis= 37  deg   - ABNORMAL ECG -   Sinus rhythm   Prolonged ND interval   Inferior infarct, old   Electronically Signed By:    Date and Time of Study: 2023-07-22 18:04:19          Meds given in ED:   Medications   sodium chloride 0.9 % flush 10 mL (has no administration in time range)   sodium chloride 0.9 % flush 10 mL (has no administration in time range)   sodium chloride 0.9 % flush 10 mL (has no administration in time range)   sodium chloride 0.9 % infusion 40 mL (has no administration in time range)   sennosides-docusate (PERICOLACE) 8.6-50 MG per tablet 2 tablet (has no administration in time range)     And   polyethylene glycol (MIRALAX) packet 17 g (has no administration in time range)     And   bisacodyl (DULCOLAX) EC tablet 5 mg (has no administration in time range)     And   bisacodyl (DULCOLAX) suppository 10 mg (has no administration in  time range)   ondansetron (ZOFRAN) tablet 4 mg (has no administration in time range)     Or   ondansetron (ZOFRAN) injection 4 mg (has no administration in time range)   nitroglycerin (NITROSTAT) SL tablet 0.4 mg (has no administration in time range)       Imaging results:  No radiology results for the last day        Social issues:   Social History     Socioeconomic History    Marital status:    Tobacco Use    Smoking status: Former     Packs/day: 1.00     Years: 10.00     Pack years: 10.00     Types: Cigarettes     Start date:      Quit date:      Years since quittin.     Passive exposure: Past    Smokeless tobacco: Never    Tobacco comments:     quit 25 years ago, chews nicotine gum   Vaping Use    Vaping Use: Never used   Substance and Sexual Activity    Alcohol use: Yes     Comment: 1 DRINK/DAY    Drug use: Never    Sexual activity: Defer              Josie Rdz RN  23 19:09 EDT

## 2023-07-22 NOTE — ED NOTES
Pt has a peritoneal dialysis cath.  He was trying to remove gauze c a scissors and cut his cath.

## 2023-07-22 NOTE — ED PROVIDER NOTES
EMERGENCY DEPARTMENT ENCOUNTER    Room Number:  39/39  Date seen:  7/22/2023  PCP: Janna Mo MD  Historian: Patient      HPI:  Chief Complaint: Peritoneal catheter problem  A complete HPI/ROS/PMH/PSH/SH/FH are unobtainable due to:   Context: Nelson Wang is a 78 y.o. male who presents to the ED c/o peritoneal catheter problem.  Patient had gauze caught around his peritoneal dialysis catheter and was cutting with scissors when he accidentally cut off the external portion of his peritoneal dialysis catheter.  Fortunately there is about 2 cm of catheter sticking out from the skin and he was able to clamp that and hold it out.  Catheter is unable to be used at this time.  Patient last had peritoneal dialysis yesterday about 24 hours ago.  Denies chest pain or trouble breathing.  Denies abdominal pain nausea or vomiting.      MEDICAL RECORD REVIEW (non ED)  I reviewed prior medical records note the patient was hospitalized in March of this year with placement of peritoneal dialysis catheter related to stage V chronic renal disease.  Other conditions include hypertension, hyperlipidemia and diabetes.    PAST MEDICAL HISTORY  Active Ambulatory Problems     Diagnosis Date Noted    Essential hypertension 08/25/2021    Bradycardia, sinus 08/25/2021    Anemia due to chronic kidney disease 08/05/2021    Hypothyroidism 02/09/2016    Idiopathic gout 07/22/2019    Proteinuria 02/09/2016    Psoriasis 07/07/2021    Thrombocytopenia 09/04/2018    Type 2 diabetes mellitus without complication 02/09/2016    CKD (chronic kidney disease), stage IV 08/25/2021    Hyperlipidemia 08/25/2021    Monoclonal gammopathy of unknown significance (MGUS) 09/04/2018    Stage 5 chronic kidney disease 01/26/2023    Peritoneal dialysis catheter in place 03/27/2023    Chronic gout without tophus 06/06/2023     Resolved Ambulatory Problems     Diagnosis Date Noted    CKD (chronic kidney disease) stage 4, GFR 15-29 ml/min ([facility]) 08/25/2021     Hyperlipidemia LDL goal <70 2021    MGUS (monoclonal gammopathy of unknown significance) 2018    Stage 3a chronic kidney disease 2019    Essential (primary) hypertension 2016    Monoclonal gammopathy of unknown significance (MGUS) 2018    Hyperlipidemia 2021    Essential hypertension 2016    Anemia in chronic kidney disease 2021    Stage 5 chronic kidney disease 2022    Essential hypertension 2016     Past Medical History:   Diagnosis Date    Anemia     Ankle pain, right     Diabetes     Gout     High cholesterol     HL (hearing loss)     Hyperkalemia     Hypertension     Vitiligo          PAST SURGICAL HISTORY  Past Surgical History:   Procedure Laterality Date    ANKLE SURGERY  1990    APPENDECTOMY N/A     COLONOSCOPY N/A 2022    [facility]    HIP BIPOLAR REPLACEMENT Right 2000    INSERTION PERITONEAL DIALYSIS CATHETER N/A 3/27/2023    Procedure: LAPAROSCOPIC INSERTION PERITONEAL DIALYSIS CATHETER, LAPAROSCOPIC OMENTOPEXY WITH LYSIS OF ADHESIONS;  Surgeon: Jose Berry MD;  Location: [facility];  Service: General;  Laterality: N/A;    RENAL BIOPSY Left 07/15/2022         FAMILY HISTORY  Family History   Problem Relation Age of Onset    Diabetes Mother     Heart disease Father     Cancer Sister 35    Diabetes Sister     Diabetes Brother     Heart disease Paternal Uncle     Malig Hyperthermia Neg Hx          SOCIAL HISTORY  Social History     Socioeconomic History    Marital status:    Tobacco Use    Smoking status: Former     Packs/day: 1.00     Years: 10.00     Pack years: 10.00     Types: Cigarettes     Start date:      Quit date: 2000     Years since quittin.5     Passive exposure: Past    Smokeless tobacco: Never    Tobacco comments:     quit 25 years ago, chews nicotine gum   Vaping Use    Vaping Use: Never used   Substance and Sexual Activity    Alcohol use: Yes     Comment: 1 DRINK/DAY    Drug use:  Never    Sexual activity: Defer         ALLERGIES  Patient has no known allergies.        REVIEW OF SYSTEMS  Review of Systems   Constitutional:  Negative for fever.   Respiratory:  Negative for shortness of breath.    Cardiovascular:  Negative for chest pain.   All other systems reviewed and are negative.         PHYSICAL EXAM  ED Triage Vitals   Temp Heart Rate Resp BP SpO2   07/22/23 1606 07/22/23 1606 07/22/23 1606 07/22/23 1614 07/22/23 1606   97.8 °F (36.6 °C) 66 16 157/75 93 %      Temp src Heart Rate Source Patient Position BP Location FiO2 (%)   07/22/23 1606 07/22/23 1606 -- -- --   Tympanic Monitor          Physical Exam    GENERAL: Alert male in no obvious distress.  Triage vitals reviewed and are fairly benign.  HENT: nares patent  EYES: no scleral icterus  CV: regular rhythm, regular rate-no murmur  RESPIRATORY: normal effort, clear to auscultation bilaterally  ABDOMEN: soft, nontender to palpation.  There is a peritoneal dialysis catheter in the lower abdomen that sticks out about 2 cm from the skin.  This catheter is clamped off with a clamp to hold it from retracting into the skin.  MUSCULOSKELETAL: no deformity  NEURO: Strength sensation and coordination are grossly intact.  Speech and mentation are unremarkable  SKIN: warm, dry      Vital signs and nursing notes reviewed.          LAB RESULTS  Recent Results (from the past 24 hour(s))   ECG 12 Lead Electrolyte Imbalance    Collection Time: 07/22/23  6:04 PM   Result Value Ref Range    QT Interval 449 ms   Comprehensive Metabolic Panel    Collection Time: 07/22/23  6:07 PM    Specimen: Blood   Result Value Ref Range    Glucose 204 (H) 65 - 99 mg/dL    BUN 66 (H) 8 - 23 mg/dL    Creatinine 6.26 (H) 0.76 - 1.27 mg/dL    Sodium 141 136 - 145 mmol/L    Potassium 4.4 3.5 - 5.2 mmol/L    Chloride 104 98 - 107 mmol/L    CO2 26.0 22.0 - 29.0 mmol/L    Calcium 8.6 8.6 - 10.5 mg/dL    Total Protein 5.9 (L) 6.0 - 8.5 g/dL    Albumin 3.4 (L) 3.5 - 5.2 g/dL     ALT (SGPT) 20 1 - 41 U/L    AST (SGOT) 25 1 - 40 U/L    Alkaline Phosphatase 70 39 - 117 U/L    Total Bilirubin 0.3 0.0 - 1.2 mg/dL    Globulin 2.5 gm/dL    A/G Ratio 1.4 g/dL    BUN/Creatinine Ratio 10.5 7.0 - 25.0    Anion Gap 11.0 5.0 - 15.0 mmol/L    eGFR 8.6 (L) >60.0 mL/min/1.73   Protime-INR    Collection Time: 07/22/23  6:07 PM    Specimen: Blood   Result Value Ref Range    Protime 13.8 11.7 - 14.2 Seconds    INR 1.05 0.90 - 1.10   CBC Auto Differential    Collection Time: 07/22/23  6:07 PM    Specimen: Blood   Result Value Ref Range    WBC 6.20 3.40 - 10.80 10*3/mm3    RBC 3.65 (L) 4.14 - 5.80 10*6/mm3    Hemoglobin 11.1 (L) 13.0 - 17.7 g/dL    Hematocrit 34.6 (L) 37.5 - 51.0 %    MCV 94.8 79.0 - 97.0 fL    MCH 30.4 26.6 - 33.0 pg    MCHC 32.1 31.5 - 35.7 g/dL    RDW 15.4 12.3 - 15.4 %    RDW-SD 53.2 37.0 - 54.0 fl    MPV 11.5 6.0 - 12.0 fL    Platelets 112 (L) 140 - 450 10*3/mm3    Neutrophil % 56.7 42.7 - 76.0 %    Lymphocyte % 31.3 19.6 - 45.3 %    Monocyte % 8.9 5.0 - 12.0 %    Eosinophil % 2.3 0.3 - 6.2 %    Basophil % 0.3 0.0 - 1.5 %    Immature Grans % 0.5 0.0 - 0.5 %    Neutrophils, Absolute 3.52 1.70 - 7.00 10*3/mm3    Lymphocytes, Absolute 1.94 0.70 - 3.10 10*3/mm3    Monocytes, Absolute 0.55 0.10 - 0.90 10*3/mm3    Eosinophils, Absolute 0.14 0.00 - 0.40 10*3/mm3    Basophils, Absolute 0.02 0.00 - 0.20 10*3/mm3    Immature Grans, Absolute 0.03 0.00 - 0.05 10*3/mm3    nRBC 0.2 0.0 - 0.2 /100 WBC       Ordered the above labs and independently interpreted results. My findings will be discussed in the medical decision making section below        RADIOLOGY  No Radiology Exams Resulted Within Past 24 Hours          PROCEDURES  Procedures          MEDICATIONS GIVEN IN ER  Medications   sodium chloride 0.9 % flush 10 mL (has no administration in time range)               MEDICAL DECISION MAKING, PROGRESS, and CONSULTS    All labs have been independently reviewed by me.  All radiology studies have been  reviewed by me and I have also reviewed the radiology report.   EKG's independently viewed and interpreted by me.  Discussion below represents my analysis of pertinent findings related to patient's condition, differential diagnosis, treatment plan and final disposition.      Additional sources:  - Discussed/ obtained information from independent historians: Son-in-law at bedside    - External (non-ED) record review: Please see documented above    - Chronic or social conditions impacting care: End-stage renal disease on dialysis    - Shared decision making: I discussed ED evaluation and treatment plan with patient who is in agreement.  Patient with inadvertent damage to his peritoneal dialysis catheter and we are unable to dialyze this patient.  I spoke with on-call surgery, Dr. Claudette Oreilly.  Unfortunately patient will need to go to the OR to have peritoneal dialysis catheter changed out.    Labs reviewed do not show evidence of significant hyperkalemia or metabolic disturbance that would necessitate emergent dialysis.    We will place patient observation unit with plan to replace catheter tomorrow morning.      Orders placed during this visit:  Orders Placed This Encounter   Procedures    Comprehensive Metabolic Panel    Protime-INR    CBC Auto Differential    Surgery (on-call MD unless specified)    ECG 12 Lead Electrolyte Imbalance    Insert Peripheral IV    Initiate ED Observation Status    CBC & Differential           Differential diagnosis:    Please see as documented below in ED course      Independent interpretation of labs, radiology studies, and discussions with consultants:  ED Course as of 07/22/23 1844   Sat Jul 22, 2023   1632 [id number] male presents with accidental cutting of peritoneal dialysis catheter.    Unfortunately I do not know what anchors this catheter.  There is not a balloon and I am not sure how to remove the catheter from the abdomen.  We will contact general surgery to discuss  disposition of this patient.  He will need to get this catheter fixed sooner than later so that we can resume his dialysis. [DB]   1757 I discussed evaluation treatment this patient with Dr. Claudette Oreilly from surgery service.    Unfortunately the catheter cannot be removed and replaced at bedside but will need to go to the OR.  She would like us to admit the patient to the observation unit and will plan to change out catheter tomorrow.    Will need to check labs and EKG to make sure there is no hyperkalemia or other significant electrolyte disturbance. [DB]   1800 I spoke with Yasmeen Montoya from the observation unit.  We will plan to admit to the ops unit assuming that his potassium levels are reasonable. [DB]   1819 EKG independently interpreted by me    Time 6:04 PM    Sinus 62  Normal P waves, prolonged MD interval  QRS-left axis deviation, inferior Q waves  ST, T waves-unremarkable    Not significant change compared to 4/2023 [DB]   1841 Potassium is reassuring at 4.4 and patient does not need emergent dialysis.  BUN and creatinine elevated at 66 and 6.3 consistent with chronic renal disease.    CBC unremarkable with normal white count and hemoglobin of 11.1.    I think patient will be appropriate for observation admission. [DB]      ED Course User Index  [DB] Winston Johns MD               DIAGNOSIS  Final diagnoses:   Peritoneal dialysis catheter dysfunction, initial encounter   End stage renal disease on dialysis         DISPOSITION  Observation admission            Latest Documented Vital Signs:  As of 18:44 EDT  BP- 158/80 HR- 66 Temp- 97.8 °F (36.6 °C) (Tympanic) O2 sat- 91%              --    Please note that portions of this were completed with a voice recognition program.       Note Disclaimer: At [facility], we believe that sharing information builds trust and better relationships. You are receiving this note because you are receiving care at [facility] or recently visited. It is possible  you will see health information before a provider has talked with you about it. This kind of information can be easy to misunderstand. To help you fully understand what it means for your health, we urge you to discuss this note with your provider.             Winston Johns MD  07/22/23 [id number]

## 2023-07-23 VITALS
DIASTOLIC BLOOD PRESSURE: 103 MMHG | OXYGEN SATURATION: 97 % | TEMPERATURE: 97.5 F | BODY MASS INDEX: 24.91 KG/M2 | RESPIRATION RATE: 18 BRPM | HEART RATE: 56 BPM | HEIGHT: 66 IN | WEIGHT: 155 LBS | SYSTOLIC BLOOD PRESSURE: 145 MMHG

## 2023-07-23 LAB
ANION GAP SERPL CALCULATED.3IONS-SCNC: 12 MMOL/L (ref 5–15)
BUN SERPL-MCNC: 67 MG/DL (ref 8–23)
BUN/CREAT SERPL: 10.3 (ref 7–25)
CALCIUM SPEC-SCNC: 8.3 MG/DL (ref 8.6–10.5)
CHLORIDE SERPL-SCNC: 107 MMOL/L (ref 98–107)
CO2 SERPL-SCNC: 21 MMOL/L (ref 22–29)
CREAT SERPL-MCNC: 6.51 MG/DL (ref 0.76–1.27)
DEPRECATED RDW RBC AUTO: 53.6 FL (ref 37–54)
EGFRCR SERPLBLD CKD-EPI 2021: 8.2 ML/MIN/1.73
ERYTHROCYTE [DISTWIDTH] IN BLOOD BY AUTOMATED COUNT: 15.8 % (ref 12.3–15.4)
GLUCOSE BLDC GLUCOMTR-MCNC: 107 MG/DL (ref 70–130)
GLUCOSE BLDC GLUCOMTR-MCNC: 111 MG/DL (ref 70–130)
GLUCOSE BLDC GLUCOMTR-MCNC: 98 MG/DL (ref 70–130)
GLUCOSE SERPL-MCNC: 81 MG/DL (ref 65–99)
HCT VFR BLD AUTO: 33.3 % (ref 37.5–51)
HGB BLD-MCNC: 10.6 G/DL (ref 13–17.7)
MCH RBC QN AUTO: 30.1 PG (ref 26.6–33)
MCHC RBC AUTO-ENTMCNC: 31.8 G/DL (ref 31.5–35.7)
MCV RBC AUTO: 94.6 FL (ref 79–97)
PLATELET # BLD AUTO: 101 10*3/MM3 (ref 140–450)
PMV BLD AUTO: 11.1 FL (ref 6–12)
POTASSIUM SERPL-SCNC: 4 MMOL/L (ref 3.5–5.2)
QT INTERVAL: 449 MS
RBC # BLD AUTO: 3.52 10*6/MM3 (ref 4.14–5.8)
SODIUM SERPL-SCNC: 140 MMOL/L (ref 136–145)
WBC NRBC COR # BLD: 6.94 10*3/MM3 (ref 3.4–10.8)

## 2023-07-23 PROCEDURE — 25010000002 HEPARIN (PORCINE) PER 1000 UNITS: Performed by: STUDENT IN AN ORGANIZED HEALTH CARE EDUCATION/TRAINING PROGRAM

## 2023-07-23 PROCEDURE — 85027 COMPLETE CBC AUTOMATED: CPT | Performed by: STUDENT IN AN ORGANIZED HEALTH CARE EDUCATION/TRAINING PROGRAM

## 2023-07-23 PROCEDURE — 49999 UNLISTED PX ABD PERTM&OMN: CPT | Performed by: STUDENT IN AN ORGANIZED HEALTH CARE EDUCATION/TRAINING PROGRAM

## 2023-07-23 PROCEDURE — G0378 HOSPITAL OBSERVATION PER HR: HCPCS

## 2023-07-23 PROCEDURE — 25010000002 CEFAZOLIN IN DEXTROSE 2000 MG/ 100 ML SOLUTION: Performed by: STUDENT IN AN ORGANIZED HEALTH CARE EDUCATION/TRAINING PROGRAM

## 2023-07-23 PROCEDURE — 82948 REAGENT STRIP/BLOOD GLUCOSE: CPT

## 2023-07-23 PROCEDURE — 80048 BASIC METABOLIC PNL TOTAL CA: CPT | Performed by: STUDENT IN AN ORGANIZED HEALTH CARE EDUCATION/TRAINING PROGRAM

## 2023-07-23 PROCEDURE — 25010000002 HEPARIN LOCK FLUSH PER 10 UNITS: Performed by: STUDENT IN AN ORGANIZED HEALTH CARE EDUCATION/TRAINING PROGRAM

## 2023-07-23 PROCEDURE — C1750 CATH, HEMODIALYSIS,LONG-TERM: HCPCS | Performed by: STUDENT IN AN ORGANIZED HEALTH CARE EDUCATION/TRAINING PROGRAM

## 2023-07-23 PROCEDURE — 99223 1ST HOSP IP/OBS HIGH 75: CPT | Performed by: STUDENT IN AN ORGANIZED HEALTH CARE EDUCATION/TRAINING PROGRAM

## 2023-07-23 DEVICE — PERITONEAL DIALYSIS ACCESSORY,TITANIUM CATHETER EXTENDER,ADULT
Type: IMPLANTABLE DEVICE | Site: ABDOMEN | Status: FUNCTIONAL
Brand: ARGYLE

## 2023-07-23 DEVICE — PERITONEAL DIALYSIS CATHETER,CURL CATH, 2 CUFFS
Type: IMPLANTABLE DEVICE | Site: ABDOMEN | Status: FUNCTIONAL
Brand: ARGYLE

## 2023-07-23 RX ORDER — LIDOCAINE HYDROCHLORIDE 10 MG/ML
0.5 INJECTION, SOLUTION EPIDURAL; INFILTRATION; INTRACAUDAL; PERINEURAL ONCE AS NEEDED
Status: DISCONTINUED | OUTPATIENT
Start: 2023-07-23 | End: 2023-07-23 | Stop reason: HOSPADM

## 2023-07-23 RX ORDER — SODIUM CHLORIDE 0.9 % (FLUSH) 0.9 %
3 SYRINGE (ML) INJECTION EVERY 12 HOURS SCHEDULED
Status: DISCONTINUED | OUTPATIENT
Start: 2023-07-23 | End: 2023-07-23 | Stop reason: HOSPADM

## 2023-07-23 RX ORDER — HYDROCODONE BITARTRATE AND ACETAMINOPHEN 5; 325 MG/1; MG/1
1 TABLET ORAL ONCE AS NEEDED
Status: DISCONTINUED | OUTPATIENT
Start: 2023-07-23 | End: 2023-07-23 | Stop reason: HOSPADM

## 2023-07-23 RX ORDER — HYDROMORPHONE HYDROCHLORIDE 1 MG/ML
0.25 INJECTION, SOLUTION INTRAMUSCULAR; INTRAVENOUS; SUBCUTANEOUS
Status: DISCONTINUED | OUTPATIENT
Start: 2023-07-23 | End: 2023-07-23 | Stop reason: HOSPADM

## 2023-07-23 RX ORDER — HYDROCODONE BITARTRATE AND ACETAMINOPHEN 7.5; 325 MG/1; MG/1
1 TABLET ORAL EVERY 4 HOURS PRN
Status: DISCONTINUED | OUTPATIENT
Start: 2023-07-23 | End: 2023-07-23 | Stop reason: HOSPADM

## 2023-07-23 RX ORDER — LABETALOL HYDROCHLORIDE 5 MG/ML
5 INJECTION, SOLUTION INTRAVENOUS
Status: DISCONTINUED | OUTPATIENT
Start: 2023-07-23 | End: 2023-07-23 | Stop reason: HOSPADM

## 2023-07-23 RX ORDER — DROPERIDOL 2.5 MG/ML
0.62 INJECTION, SOLUTION INTRAMUSCULAR; INTRAVENOUS
Status: DISCONTINUED | OUTPATIENT
Start: 2023-07-23 | End: 2023-07-23 | Stop reason: HOSPADM

## 2023-07-23 RX ORDER — PROMETHAZINE HYDROCHLORIDE 25 MG/1
25 TABLET ORAL ONCE AS NEEDED
Status: DISCONTINUED | OUTPATIENT
Start: 2023-07-23 | End: 2023-07-23 | Stop reason: HOSPADM

## 2023-07-23 RX ORDER — FENTANYL CITRATE 50 UG/ML
25 INJECTION, SOLUTION INTRAMUSCULAR; INTRAVENOUS
Status: DISCONTINUED | OUTPATIENT
Start: 2023-07-23 | End: 2023-07-23 | Stop reason: HOSPADM

## 2023-07-23 RX ORDER — CEFAZOLIN SODIUM IN 0.9 % NACL 2 G/100 ML
2000 PLASTIC BAG, INJECTION (ML) INTRAVENOUS ONCE
Status: DISCONTINUED | OUTPATIENT
Start: 2023-07-23 | End: 2023-07-23

## 2023-07-23 RX ORDER — NALOXONE HCL 0.4 MG/ML
0.2 VIAL (ML) INJECTION AS NEEDED
Status: DISCONTINUED | OUTPATIENT
Start: 2023-07-23 | End: 2023-07-23 | Stop reason: HOSPADM

## 2023-07-23 RX ORDER — HEPARIN SODIUM (PORCINE) LOCK FLUSH IV SOLN 100 UNIT/ML 100 UNIT/ML
SOLUTION INTRAVENOUS AS NEEDED
Status: DISCONTINUED | OUTPATIENT
Start: 2023-07-23 | End: 2023-07-23 | Stop reason: HOSPADM

## 2023-07-23 RX ORDER — HYDRALAZINE HYDROCHLORIDE 20 MG/ML
5 INJECTION INTRAMUSCULAR; INTRAVENOUS
Status: DISCONTINUED | OUTPATIENT
Start: 2023-07-23 | End: 2023-07-23 | Stop reason: HOSPADM

## 2023-07-23 RX ORDER — SODIUM CHLORIDE 9 MG/ML
9 INJECTION, SOLUTION INTRAVENOUS CONTINUOUS PRN
Status: DISCONTINUED | OUTPATIENT
Start: 2023-07-23 | End: 2023-07-23 | Stop reason: HOSPADM

## 2023-07-23 RX ORDER — BUPIVACAINE HYDROCHLORIDE AND EPINEPHRINE 5; 5 MG/ML; UG/ML
INJECTION, SOLUTION PERINEURAL AS NEEDED
Status: DISCONTINUED | OUTPATIENT
Start: 2023-07-23 | End: 2023-07-23 | Stop reason: HOSPADM

## 2023-07-23 RX ORDER — SODIUM CHLORIDE 0.9 % (FLUSH) 0.9 %
3-10 SYRINGE (ML) INJECTION AS NEEDED
Status: DISCONTINUED | OUTPATIENT
Start: 2023-07-23 | End: 2023-07-23 | Stop reason: HOSPADM

## 2023-07-23 RX ORDER — IPRATROPIUM BROMIDE AND ALBUTEROL SULFATE 2.5; .5 MG/3ML; MG/3ML
3 SOLUTION RESPIRATORY (INHALATION) ONCE AS NEEDED
Status: DISCONTINUED | OUTPATIENT
Start: 2023-07-23 | End: 2023-07-23 | Stop reason: HOSPADM

## 2023-07-23 RX ORDER — DIPHENHYDRAMINE HYDROCHLORIDE 50 MG/ML
12.5 INJECTION INTRAMUSCULAR; INTRAVENOUS
Status: DISCONTINUED | OUTPATIENT
Start: 2023-07-23 | End: 2023-07-23 | Stop reason: HOSPADM

## 2023-07-23 RX ORDER — HEPARIN SODIUM 1000 [USP'U]/ML
INJECTION, SOLUTION INTRAVENOUS; SUBCUTANEOUS AS NEEDED
Status: DISCONTINUED | OUTPATIENT
Start: 2023-07-23 | End: 2023-07-23 | Stop reason: HOSPADM

## 2023-07-23 RX ORDER — ONDANSETRON 2 MG/ML
4 INJECTION INTRAMUSCULAR; INTRAVENOUS ONCE AS NEEDED
Status: DISCONTINUED | OUTPATIENT
Start: 2023-07-23 | End: 2023-07-23 | Stop reason: HOSPADM

## 2023-07-23 RX ORDER — CEFAZOLIN SODIUM 2 G/100ML
2000 INJECTION, SOLUTION INTRAVENOUS ONCE
Status: COMPLETED | OUTPATIENT
Start: 2023-07-23 | End: 2023-07-23

## 2023-07-23 RX ORDER — PROMETHAZINE HYDROCHLORIDE 25 MG/1
25 SUPPOSITORY RECTAL ONCE AS NEEDED
Status: DISCONTINUED | OUTPATIENT
Start: 2023-07-23 | End: 2023-07-23 | Stop reason: HOSPADM

## 2023-07-23 RX ORDER — EPHEDRINE SULFATE 50 MG/ML
5 INJECTION, SOLUTION INTRAVENOUS ONCE AS NEEDED
Status: DISCONTINUED | OUTPATIENT
Start: 2023-07-23 | End: 2023-07-23 | Stop reason: HOSPADM

## 2023-07-23 RX ORDER — FLUMAZENIL 0.1 MG/ML
0.2 INJECTION INTRAVENOUS AS NEEDED
Status: DISCONTINUED | OUTPATIENT
Start: 2023-07-23 | End: 2023-07-23 | Stop reason: HOSPADM

## 2023-07-23 RX ORDER — FAMOTIDINE 10 MG/ML
20 INJECTION, SOLUTION INTRAVENOUS ONCE
Status: COMPLETED | OUTPATIENT
Start: 2023-07-23 | End: 2023-07-23

## 2023-07-23 RX ADMIN — LEVOTHYROXINE SODIUM 176 MCG: 0.09 TABLET ORAL at 06:52

## 2023-07-23 RX ADMIN — SODIUM CHLORIDE 9 ML/HR: 9 INJECTION, SOLUTION INTRAVENOUS at 12:46

## 2023-07-23 RX ADMIN — Medication 10 ML: at 08:31

## 2023-07-23 RX ADMIN — CARVEDILOL 25 MG: 25 TABLET, FILM COATED ORAL at 08:29

## 2023-07-23 RX ADMIN — CEFAZOLIN SODIUM 2000 MG: 2 INJECTION, SOLUTION INTRAVENOUS at 14:10

## 2023-07-23 RX ADMIN — FAMOTIDINE 20 MG: 10 INJECTION INTRAVENOUS at 12:46

## 2023-07-23 RX ADMIN — ATORVASTATIN CALCIUM 40 MG: 20 TABLET, FILM COATED ORAL at 08:29

## 2023-07-23 RX ADMIN — ALLOPURINOL 300 MG: 100 TABLET ORAL at 08:30

## 2023-07-23 RX ADMIN — TORSEMIDE 40 MG: 20 TABLET ORAL at 08:29

## 2023-07-23 NOTE — PROGRESS NOTES
MD ATTESTATION NOTE    The MARIANELA and I have discussed this patient's history, physical exam, and treatment plan.  I have reviewed the documentation and personally had a face to face interaction with the patient. I affirm the documentation and agree with the treatment and plan.  The attached note describes my personal findings.      I provided a substantive portion of the care of the patient.  I personally performed the physical exam in its entirety, and below are my findings.       Brief HPI: This patient is a 78-year-old male with history of chronic peritoneal dialysis admitted to the observation unit today after accidentally cutting his peritoneal dialysis catheter with scissors.  He currently denies any physical complaints.      PHYSICAL EXAM  ED Triage Vitals   Temp Heart Rate Resp BP SpO2   07/22/23 1606 07/22/23 1606 07/22/23 1606 07/22/23 1614 07/22/23 1606   97.8 °F (36.6 °C) 66 16 157/75 93 %      Temp src Heart Rate Source Patient Position BP Location FiO2 (%)   07/22/23 1606 07/22/23 1606 07/22/23 1952 07/22/23 1952 --   Tympanic Monitor Lying Right arm          GENERAL: Resting comfortably and in no acute distress, nontoxic in appearance  HENT: nares patent  EYES: no scleral icterus  CV: regular rhythm, normal rate, no M/R/G  RESPIRATORY: normal effort, lungs clear bilaterally  ABDOMEN: soft, nontender, no rebound or guarding, nonfunctional peritoneal dialysis catheter in place  MUSCULOSKELETAL: no deformity, no edema  NEURO: alert, moves all extremities, follows commands  PSYCH:  calm, cooperative  SKIN: warm, dry    Vital signs and nursing notes reviewed.        Plan: We will monitor through the evening and have general surgery see in the morning for replacement of his peritoneal dialysis catheter.

## 2023-07-23 NOTE — PROGRESS NOTES
ED OBSERVATION PROGRESS/DISCHARGE SUMMARY    Date of Admission: 7/22/2023   LOS: 0 days   PCP: Janna Mo MD    Final Diagnosis end-stage renal disease on peritoneal dialysis, peritoneal dialysis catheter malfunction      Subjective     Hospital Outcome:     Pleasant afebrile 78-year-old gentleman admitted to the ED observation unit for accidental malfunction of peritoneal dialysis catheter that occurred when he was sniffing gauze and accidentally cut the catheter tip.    He was made n.p.o. after midnight and he was seen and evaluated by general surgery service and taken to the operating room this morning for exchange of peritoneal dialysis catheter.  This was revised by Dr. Oreilly with the general surgery service and cleared for discharge home following operative procedure.  Patient and family are agreeable to plan.    ROS:  General: no fevers, chills  Respiratory: no cough, dyspnea  Cardiovascular: no chest pain, palpitations  Abdomen: No abdominal pain, nausea, vomiting, or diarrhea  Neurologic: No focal weakness    Objective   Physical Exam:  I have reviewed the vital signs.  Temp:  [97.8 °F (36.6 °C)-98.1 °F (36.7 °C)] 97.9 °F (36.6 °C)  Heart Rate:  [60-67] 65  Resp:  [16-18] 18  BP: (144-188)/(66-81) 177/81  General Appearance:    Alert, cooperative, no distress  Head:    Normocephalic, atraumatic  Eyes:    Sclerae anicteric  Neck:   Supple, no mass  Lungs: Clear to auscultation bilaterally, respirations unlabored  Heart: Regular rate and rhythm, S1 and S2 normal, no murmur, rub or gallop  Abdomen:  Soft, non-tender, bowel sounds active, peritoneal dialysis catheter present  Extremities: No clubbing, cyanosis, or edema to lower extremities  Pulses:  2+ and symmetric in distal lower extremities  Skin: No rashes   Neurologic: Oriented x3, Normal strength to extremities    Results Review:    I have reviewed the labs, radiology results and diagnostic studies.    Results from last 7 days   Lab Units  07/23/23  0435   WBC 10*3/mm3 6.94   HEMOGLOBIN g/dL 10.6*   HEMATOCRIT % 33.3*   PLATELETS 10*3/mm3 101*     Results from last 7 days   Lab Units 07/23/23 0435 07/22/23  1807   SODIUM mmol/L 140 141   POTASSIUM mmol/L 4.0 4.4   CHLORIDE mmol/L 107 104   CO2 mmol/L 21.0* 26.0   BUN mg/dL 67* 66*   CREATININE mg/dL 6.51* 6.26*   CALCIUM mg/dL 8.3* 8.6   BILIRUBIN mg/dL  --  0.3   ALK PHOS U/L  --  70   ALT (SGPT) U/L  --  20   AST (SGOT) U/L  --  25   GLUCOSE mg/dL 81 204*     Imaging Results (Last 24 Hours)       ** No results found for the last 24 hours. **            I have reviewed the medications.  ---------------------------------------------------------------------------------------------  Assessment & Plan   Assessment/Problem List    Peritoneal dialysis catheter dysfunction      Plan:    Peritoneal dialysis catheter dysfunction  -General surgery consult, to operating room for peritoneal dialysis revision  -N.p.o. after midnight     Hypertension  -Continue carvedilol     DM2  -Accu-Chek before meals and at bedtime  -Insulin sliding scale     Hyperlipidemia  -Continue atorvastatin     Hypothyroidism  -Continue levothyroxine    Disposition: To operating room then home    Follow-up after Discharge: PCP, nephrology, general surgery    This note will serve as a discharge summary.    Kayy Garcia, APRN 07/23/23 08:44 EDT    I have worn appropriate PPE during this patient encounter, sanitized my hands both with entering and exiting patient's room.      36 minutes has been spent by [facility] providers in the care of this patient while under observation status

## 2023-07-23 NOTE — PLAN OF CARE
Problem: Adult Inpatient Plan of Care  Goal: Plan of Care Review  Outcome: Ongoing, Progressing  Flowsheets (Taken 7/23/2023 0619)  Progress: improving  Plan of Care Reviewed With: patient  Outcome Evaluation: Patient is a 78 year old male admitted to the observation unit due peritoneal dialysis catheter dysfunction. Patient is alert and oriented, room air and ambulates to the bathroom. Patient is NPO. General surgery consulted.  Goal: Patient-Specific Goal (Individualized)  Outcome: Ongoing, Progressing  Goal: Absence of Hospital-Acquired Illness or Injury  Outcome: Ongoing, Progressing  Intervention: Identify and Manage Fall Risk  Recent Flowsheet Documentation  Taken 7/23/2023 0600 by Rosa Huerta RN  Safety Promotion/Fall Prevention:   assistive device/personal items within reach   clutter free environment maintained   fall prevention program maintained   lighting adjusted   nonskid shoes/slippers when out of bed   room organization consistent   safety round/check completed  Taken 7/23/2023 0400 by Rosa Huerta RN  Safety Promotion/Fall Prevention:   assistive device/personal items within reach   clutter free environment maintained   fall prevention program maintained   lighting adjusted   nonskid shoes/slippers when out of bed   room organization consistent   safety round/check completed  Taken 7/23/2023 0200 by Rosa Huerta RN  Safety Promotion/Fall Prevention:   assistive device/personal items within reach   clutter free environment maintained   fall prevention program maintained  Taken 7/22/2023 2012 by Rosa Huerta, RN  Safety Promotion/Fall Prevention:   assistive device/personal items within reach   clutter free environment maintained   fall prevention program maintained   lighting adjusted   nonskid shoes/slippers when out of bed   room organization consistent   safety round/check completed  Intervention: Prevent Skin Injury  Recent Flowsheet Documentation  Taken 7/23/2023 0600 by Rosa Huerta,  RN  Body Position: position changed independently  Taken 7/23/2023 0400 by Rosa Huerta RN  Body Position: position changed independently  Taken 7/23/2023 0200 by Rosa Huerta RN  Body Position: position changed independently  Taken 7/22/2023 2012 by Rosa Huerta RN  Body Position: position changed independently  Skin Protection: adhesive use limited  Intervention: Prevent and Manage VTE (Venous Thromboembolism) Risk  Recent Flowsheet Documentation  Taken 7/23/2023 0600 by Rosa Huerta RN  Activity Management: activity minimized  Taken 7/23/2023 0400 by Rosa Huerta RN  Activity Management: activity minimized  Range of Motion: active ROM (range of motion) encouraged  Taken 7/23/2023 0200 by Rosa Huerta RN  Activity Management: activity minimized  Taken 7/22/2023 2012 by Rosa Huerta RN  Activity Management: ambulated in room  VTE Prevention/Management: sequential compression devices off  Range of Motion: active ROM (range of motion) encouraged  Intervention: Prevent Infection  Recent Flowsheet Documentation  Taken 7/23/2023 0600 by Rosa Huerta RN  Infection Prevention:   hand hygiene promoted   rest/sleep promoted   single patient room provided  Taken 7/23/2023 0400 by Rosa Huerta RN  Infection Prevention:   hand hygiene promoted   rest/sleep promoted   single patient room provided  Taken 7/23/2023 0200 by Rosa Huerta RN  Infection Prevention:   hand hygiene promoted   rest/sleep promoted   single patient room provided  Taken 7/22/2023 2012 by Rosa Huerta RN  Infection Prevention:   hand hygiene promoted   rest/sleep promoted   single patient room provided  Goal: Optimal Comfort and Wellbeing  Outcome: Ongoing, Progressing  Intervention: Provide Person-Centered Care  Recent Flowsheet Documentation  Taken 7/22/2023 2012 by Rosa Huerta RN  Trust Relationship/Rapport:   care explained   choices provided   questions answered   questions encouraged  Goal: Readiness for Transition of  Care  Outcome: Ongoing, Progressing  Intervention: Mutually Develop Transition Plan  Recent Flowsheet Documentation  Taken 7/22/2023 2014 by Rosa Huerta RN  Transportation Anticipated: family or friend will provide  Patient/Family Anticipated Services at Transition: none  Patient/Family Anticipates Transition to: home with family  Taken 7/22/2023 2011 by Rosa Huerta, RN  Equipment Currently Used at Home: none  Goal: Plan of Care Review  Outcome: Ongoing, Progressing  Flowsheets (Taken 7/23/2023 0619)  Progress: improving  Plan of Care Reviewed With: patient  Outcome Evaluation: Patient is a 78 year old male admitted to the observation unit due peritoneal dialysis catheter dysfunction. Patient is alert and oriented, room air and ambulates to the bathroom. Patient is NPO. General surgery consulted.  Goal: Patient-Specific Goal (Individualized)  Outcome: Ongoing, Progressing  Goal: Absence of Hospital-Acquired Illness or Injury  Outcome: Ongoing, Progressing  Intervention: Identify and Manage Fall Risk  Recent Flowsheet Documentation  Taken 7/23/2023 0600 by Rosa Huerta RN  Safety Promotion/Fall Prevention:   assistive device/personal items within reach   clutter free environment maintained   fall prevention program maintained   lighting adjusted   nonskid shoes/slippers when out of bed   room organization consistent   safety round/check completed  Taken 7/23/2023 0400 by Rosa Huerta RN  Safety Promotion/Fall Prevention:   assistive device/personal items within reach   clutter free environment maintained   fall prevention program maintained   lighting adjusted   nonskid shoes/slippers when out of bed   room organization consistent   safety round/check completed  Taken 7/23/2023 0200 by Rosa Huerta, RN  Safety Promotion/Fall Prevention:   assistive device/personal items within reach   clutter free environment maintained   fall prevention program maintained  Taken 7/22/2023 2012 by Rosa Huerta, RN  Safety  Promotion/Fall Prevention:   assistive device/personal items within reach   clutter free environment maintained   fall prevention program maintained   lighting adjusted   nonskid shoes/slippers when out of bed   room organization consistent   safety round/check completed  Intervention: Prevent Skin Injury  Recent Flowsheet Documentation  Taken 7/23/2023 0600 by Rosa Huerta RN  Body Position: position changed independently  Taken 7/23/2023 0400 by Rosa Huerta RN  Body Position: position changed independently  Taken 7/23/2023 0200 by Rosa Huerta RN  Body Position: position changed independently  Taken 7/22/2023 2012 by Rosa Huerta RN  Body Position: position changed independently  Skin Protection: adhesive use limited  Intervention: Prevent and Manage VTE (Venous Thromboembolism) Risk  Recent Flowsheet Documentation  Taken 7/23/2023 0600 by Rosa Huerta RN  Activity Management: activity minimized  Taken 7/23/2023 0400 by Rosa Huerta RN  Activity Management: activity minimized  Range of Motion: active ROM (range of motion) encouraged  Taken 7/23/2023 0200 by Rosa Huerta RN  Activity Management: activity minimized  Taken 7/22/2023 2012 by Rosa Huerta RN  Activity Management: ambulated in room  VTE Prevention/Management: sequential compression devices off  Range of Motion: active ROM (range of motion) encouraged  Intervention: Prevent Infection  Recent Flowsheet Documentation  Taken 7/23/2023 0600 by Rosa Huerta RN  Infection Prevention:   hand hygiene promoted   rest/sleep promoted   single patient room provided  Taken 7/23/2023 0400 by Rosa Huerta RN  Infection Prevention:   hand hygiene promoted   rest/sleep promoted   single patient room provided  Taken 7/23/2023 0200 by Rosa Huerta RN  Infection Prevention:   hand hygiene promoted   rest/sleep promoted   single patient room provided  Taken 7/22/2023 2012 by Rosa Huerta RN  Infection Prevention:   hand hygiene promoted   rest/sleep  promoted   single patient room provided  Goal: Optimal Comfort and Wellbeing  Outcome: Ongoing, Progressing  Intervention: Provide Person-Centered Care  Recent Flowsheet Documentation  Taken 7/22/2023 2012 by Rosa Huerta RN  Trust Relationship/Rapport:   care explained   choices provided   questions answered   questions encouraged  Goal: Readiness for Transition of Care  Outcome: Ongoing, Progressing  Intervention: Mutually Develop Transition Plan  Recent Flowsheet Documentation  Taken 7/22/2023 2014 by Rosa Huerta RN  Transportation Anticipated: family or friend will provide  Patient/Family Anticipated Services at Transition: none  Patient/Family Anticipates Transition to: home with family  Taken 7/22/2023 2011 by Rosa Huetra RN  Equipment Currently Used at Home: none     Problem: Fall Injury Risk  Goal: Absence of Fall and Fall-Related Injury  Outcome: Ongoing, Progressing  Intervention: Identify and Manage Contributors  Recent Flowsheet Documentation  Taken 7/22/2023 2012 by Rosa Huerta RN  Medication Review/Management: medications reviewed  Intervention: Promote Injury-Free Environment  Recent Flowsheet Documentation  Taken 7/23/2023 0600 by Rosa Huerta, RN  Safety Promotion/Fall Prevention:   assistive device/personal items within reach   clutter free environment maintained   fall prevention program maintained   lighting adjusted   nonskid shoes/slippers when out of bed   room organization consistent   safety round/check completed  Taken 7/23/2023 0400 by Rosa Huerta RN  Safety Promotion/Fall Prevention:   assistive device/personal items within reach   clutter free environment maintained   fall prevention program maintained   lighting adjusted   nonskid shoes/slippers when out of bed   room organization consistent   safety round/check completed  Taken 7/23/2023 0200 by Rosa Huerta, RN  Safety Promotion/Fall Prevention:   assistive device/personal items within reach   clutter free environment  maintained   fall prevention program maintained  Taken 7/22/2023 2012 by Rosa Huerta RN  Safety Promotion/Fall Prevention:   assistive device/personal items within reach   clutter free environment maintained   fall prevention program maintained   lighting adjusted   nonskid shoes/slippers when out of bed   room organization consistent   safety round/check completed   Goal Outcome Evaluation:  Plan of Care Reviewed With: patient        Progress: improving  Outcome Evaluation: Patient is a 78 year old male admitted to the observation unit due peritoneal dialysis catheter dysfunction. Patient is alert and oriented, room air and ambulates to the bathroom. Patient is NPO. General surgery consulted.

## 2023-07-23 NOTE — OP NOTE
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  He placed a clamp on the end that was still remaining and came to the ER.  Revision in the operating room was recommended.  All risks (including bleeding, infection, damage to surrounding structures), benefits and alternatives were explained to the patient who agreed and wished to proceed. Informed consent was signed.     OPERATIVE COURSE: The patient was taken to the operating room, transferred onto the operating room table, and underwent anesthesia without incident. The patient was prepped and draped in sterile fashion. A time out was performed and preoperative antibiotics were given.  The tract of the peritoneal dialysis catheter in the subcutaneous tissue was palpated and marked with a marking pen.  A location several centimeters back from the exit site was identified and half percent Marcaine with epinephrine was administered. an incision was made over this area.  The tube was brought back through this incision.  The contaminated distal end was cut and disposed of.  This was distal to the second cuff.  The metal extension piece was then placed here and tied in place with a 2-0 Prolene.  A new piece of fresh tubing was then tunneled through the subcutaneous tissue and brought out superior and lateral to the prior exit site.  It was connected to the extension with a 2-0 Prolene.  It was flushed  with heparinized saline and easily came out to gravity.  The incision was closed with an interrupted 3-0 Vicryl suture in a running 4-0 Vicryl suture.  Skin glue was placed over the incision.  The patient tolerated the procedure well. All needle and lap counts were correct at the end of the case. The patient was then awoken from anesthesia and taken to recovery for further monitoring.          Radha Oreilly MD   General and Endoscopic Surgery  [facility]    [address] [place] [address], [address]  [place], [place], [address]  P: [phone]  F: [phone]

## 2023-07-23 NOTE — H&P
General Surgery history and physical exam    Summary:    Mr. Nelson Wang is a 78 y.o. year old gentleman who accidentally cut his PD catheter yesterday.  Plan for revision in the operating room today.  Should be okay for discharge postoperatively.    Chief Complaint:    Disrupted PD cath    History of Present Illness:    Mr. Nelson Wang is a 78 y.o. year old gentleman who accidentally cut his peritoneal dialysis catheter while cutting the gauze yesterday.  He placed a clamp on it and came to the ER.  He presents today for revision.    Past Medical History:   Past Medical History:   Diagnosis Date    Anemia     Ankle pain, right     CKD (chronic kidney disease), stage IV     Diabetes     Gout     High cholesterol     HL (hearing loss)     Hyperkalemia     Hypertension     Hypothyroidism     Psoriasis     Vitiligo       Past Surgical History:    Past Surgical History:   Procedure Laterality Date    ANKLE SURGERY  1990    APPENDECTOMY N/A     COLONOSCOPY N/A 2022    [facility]    HIP BIPOLAR REPLACEMENT Right 2000    INSERTION PERITONEAL DIALYSIS CATHETER N/A 3/27/2023    Procedure: LAPAROSCOPIC INSERTION PERITONEAL DIALYSIS CATHETER, LAPAROSCOPIC OMENTOPEXY WITH LYSIS OF ADHESIONS;  Surgeon: Jose Berry MD;  Location: [facility];  Service: General;  Laterality: N/A;    RENAL BIOPSY Left 07/15/2022     Family History:    Family History   Problem Relation Age of Onset    Diabetes Mother     Heart disease Father     Cancer Sister 35    Diabetes Sister     Diabetes Brother     Heart disease Paternal Uncle     Malig Hyperthermia Neg Hx      Social History:    Social History     Socioeconomic History    Marital status:    Tobacco Use    Smoking status: Former     Packs/day: 1.00     Years: 10.00     Pack years: 10.00     Types: Cigarettes     Start date:      Quit date:      Years since quittin.5     Passive exposure: Past    Smokeless tobacco: Never    Tobacco  comments:     quit 25 years ago, chews nicotine gum   Vaping Use    Vaping Use: Never used   Substance and Sexual Activity    Alcohol use: Yes     Comment: 1 DRINK/DAY    Drug use: Never    Sexual activity: Defer     Allergies:   No Known Allergies    Medications:     Current Facility-Administered Medications:     [MAR Hold] allopurinol (ZYLOPRIM) tablet 300 mg, 300 mg, Oral, Daily, Qadah, Abdalhakim, APRN, 300 mg at 07/23/23 0830    [MAR Hold] atorvastatin (LIPITOR) tablet 40 mg, 40 mg, Oral, Daily, Qadah, Abdalhakim, APRN, 40 mg at 07/23/23 0829    [MAR Hold] sennosides-docusate (PERICOLACE) 8.6-50 MG per tablet 2 tablet, 2 tablet, Oral, BID **AND** [MAR Hold] polyethylene glycol (MIRALAX) packet 17 g, 17 g, Oral, Daily PRN **AND** [MAR Hold] bisacodyl (DULCOLAX) EC tablet 5 mg, 5 mg, Oral, Daily PRN **AND** [MAR Hold] bisacodyl (DULCOLAX) suppository 10 mg, 10 mg, Rectal, Daily PRN, Rosangela Montoya PA-C    carvedilol (COREG) tablet 25 mg, 25 mg, Oral, BID, Qadah, Abdalhakim, APRN, 25 mg at 07/23/23 0829    ceFAZolin in dextrose (ANCEF) IVPB solution 2,000 mg, 2,000 mg, Intravenous, Once, Radha Oreilly MD    [MAR Hold] dextrose (D50W) (25 g/50 mL) IV injection 25 g, 25 g, Intravenous, Q15 Min PRN, Qadah, Abdalhakim, APRN    [MAR Hold] dextrose (GLUTOSE) oral gel 15 g, 15 g, Oral, Q15 Min PRN, Qadah, Abdalhakim, APRN    [MAR Hold] glucagon (GLUCAGEN) injection 1 mg, 1 mg, Intramuscular, Q15 Min PRN, Qadah, Abdalhakim, APRN    [MAR Hold] insulin glargine (LANTUS, SEMGLEE) injection 15 Units, 15 Units, Subcutaneous, Nightly, Qadah, Abdalhakim, APRN, 15 Units at 07/22/23 2145    [MAR Hold] insulin lispro (HUMALOG/ADMELOG) injection 2-9 Units, 2-9 Units, Subcutaneous, 4x Daily AC & at Bedtime, Mima Tyson APRN, 2 Units at 07/22/23 2145    [MAR Hold] levothyroxine (SYNTHROID, LEVOTHROID) tablet 176 mcg, 176 mcg, Oral, Q AM, Mima Tyson, BECKY, 176 mcg at 07/23/23 0652    lidocaine PF 1%  (XYLOCAINE) injection 0.5 mL, 0.5 mL, Injection, Once PRN, Vibha Jean MD    [MAR Hold] nitroglycerin (NITROSTAT) SL tablet 0.4 mg, 0.4 mg, Sublingual, Q5 Min PRN, Rosangela Montoya PA-C    [MAR Hold] ondansetron (ZOFRAN) tablet 4 mg, 4 mg, Oral, Q6H PRN **OR** [MAR Hold] ondansetron (ZOFRAN) injection 4 mg, 4 mg, Intravenous, Q6H PRN, Rosangela Montoya PA-C    [MAR Hold] sertraline (ZOLOFT) tablet 100 mg, 100 mg, Oral, Nightly, Qadah, Abdalhakim, APRN, 100 mg at 07/22/23 2049    [COMPLETED] Insert Peripheral IV, , , Once **AND** [MAR Hold] sodium chloride 0.9 % flush 10 mL, 10 mL, Intravenous, PRN, Winston Johns MD    [MAR Hold] sodium chloride 0.9 % flush 10 mL, 10 mL, Intravenous, Q12H, Rosangela Montoya PA-C, 10 mL at 07/23/23 0831    [MAR Hold] sodium chloride 0.9 % flush 10 mL, 10 mL, Intravenous, PRN, Rosangela Montoya, PA-C    sodium chloride 0.9 % flush 3 mL, 3 mL, Intravenous, Q12H, Vibha Jean MD    sodium chloride 0.9 % flush 3-10 mL, 3-10 mL, Intravenous, PRN, Vibha Jean MD    [MAR Hold] sodium chloride 0.9 % infusion 40 mL, 40 mL, Intravenous, PRN, Rosangela Montoya, PA-C    sodium chloride 0.9 % infusion, 9 mL/hr, Intravenous, Continuous PRN, Vibha Jean MD, Last Rate: 9 mL/hr at 07/23/23 1246, 9 mL/hr at 07/23/23 1246    [MAR Hold] torsemide (DEMADEX) tablet 40 mg, 40 mg, Oral, Daily, Qadah, Abdalhakim, APRN, 40 mg at 07/23/23 0829    Radiology/Endoscopy:    No recent pertinent imaging    Labs:    Labs reviewed    Review of Systems:   Influenza-like illness: no fever, no  cough, no  sore throat, no  body aches, no loss of sense of taste or smell, no known exposure to person with Covid-19.  Constitutional: Negative for fevers or chills  HENT: Negative for hearing loss or runny nose  Eyes: Negative for vision changes or scleral icterus  Respiratory: Negative for cough or shortness of breath  Cardiovascular: Negative for chest pain or heart  palpitations  Gastrointestinal: Negative for abdominal pain, nausea, vomiting, constipation, melena, or hematochezia  Genitourinary: Negative for hematuria or dysuria  Musculoskeletal: Negative for joint swelling or gait instability  Neurologic: Negative for tremors or seizures  Psychiatric: Negative for suicidal ideations or depression  All other systems reviewed and negative    Physical Exam:   Constitutional: Well-developed well-nourished, no acute distress  Eyes:  Conjunctivae normal, sclerae nonicteric  ENMT: Hearing grossly normal, oral mucosa moist  Neck: Supple, trachea midline  Respiratory: Clear to auscultation, normal inspiratory effort  Cardiovascular: Regular rate, no peripheral edema, no jugular venous distention  Gastrointestinal: Soft, nontender, PD cath cut just distal to the skin  Skin:  Warm, dry, no rash on visualized skin surfaces  Musculoskeletal: Symmetric strength, normal gait  Psychiatric: Alert and oriented ×3, normal affect     SAIMA LAW M.D.  General and Endoscopic Surgery  [facility]    [address], [address]  [place], [place], [address]  P: [phone]  F: [phone]

## 2023-07-23 NOTE — H&P
. [facility]   HISTORY AND PHYSICAL    Patient Name: Nelson Wang  : 1945  MRN: [id number]  Primary Care Physician:  Janna Mo MD  Date of admission: 2023    Subjective   Subjective     Chief Complaint: Peritoneal catheter problem    HPI:    Nelson Wang is a 78 y.o. male with past medical history including but not limited to anemia, stage V CKD on peritoneal dialysis, DM 2, hyperlipidemia, and hypertension presents to [facility] with peritoneal catheter problem.  Patient was attempting to cut some gauze that was caught around his peritoneal dialysis catheter when he accidentally cut off the external portion of his catheter.  Catheter is unable to be used at this time.  Patient reports that his last dialysis was about 24 hours ago.      Denies abdominal pain, nausea, vomiting, diarrhea.  Denies chest pain, palpitation, or dyspnea.  Denies cough, fever, chills, lower extremity edema    The ED physician consulted Dr. Oreilly with general surgery who agreed on replacing the peritoneal dialysis catheter tomorrow therefore asked admit patient to the observation unit.    Evaluation in the ED include creatinine 6.6, BUN 66, INR 1.05, hemoglobin 11.1 and platelets 112    Review of Systems   All systems were reviewed and negative except for: The mentioned above in HPI    Personal History     Past Medical History:   Diagnosis Date    Anemia     Ankle pain, right     CKD (chronic kidney disease), stage IV     Diabetes     Gout     High cholesterol     HL (hearing loss)     Hyperkalemia     Hypertension     Hypothyroidism     Psoriasis     Vitiligo        Past Surgical History:   Procedure Laterality Date    ANKLE SURGERY  1990    APPENDECTOMY N/A     COLONOSCOPY N/A 2022    [facility]    HIP BIPOLAR REPLACEMENT Right 2000    INSERTION PERITONEAL DIALYSIS CATHETER N/A 3/27/2023    Procedure: LAPAROSCOPIC INSERTION PERITONEAL DIALYSIS CATHETER, LAPAROSCOPIC OMENTOPEXY  WITH LYSIS OF ADHESIONS;  Surgeon: Jose Berry MD;  Location: [facility];  Service: General;  Laterality: N/A;    RENAL BIOPSY Left 07/15/2022       Family History: family history includes Cancer (age of onset: 35) in his sister; Diabetes in his brother, mother, and sister; Heart disease in his father and paternal uncle. Otherwise pertinent FHx was reviewed and not pertinent to current issue.    Social History:  reports that he quit smoking about 23 years ago. His smoking use included cigarettes. He started smoking about 33 years ago. He has a 10.00 pack-year smoking history. He has been exposed to tobacco smoke. He has never used smokeless tobacco. He reports current alcohol use. He reports that he does not use drugs.    Home Medications:  Buffered Vitamin C, Insulin Glargine, Risankizumab-rzaa, Torsemide, Turmeric, allopurinol, ascorbic acid, atorvastatin, carvedilol, clobetasol, fish oil, gentamicin, levothyroxine, sennosides-docusate, and sertraline    Allergies:  No Known Allergies    Objective   Objective     Vitals:   Temp:  [97.8 °F (36.6 °C)-98 °F (36.7 °C)] 98 °F (36.7 °C)  Heart Rate:  [60-66] 60  Resp:  [16-18] 18  BP: (157-188)/(75-80) 188/79  Physical Exam    Constitutional: Awake, alert, in no acute distress   Eyes: PERRLA, sclerae anicteric, no conjunctival injection   HENT: NCAT, mucous membranes moist   Neck: Supple, no thyromegaly, no lymphadenopathy, trachea midline   Respiratory: Bibasilar coarse crackles   Cardiovascular: RRR, no murmurs, rubs, or gallops, palpable pedal pulses bilaterally   Gastrointestinal: Positive bowel sounds, soft, nontender, nondistended   Musculoskeletal: No bilateral ankle edema, no clubbing or cyanosis to extremities   Psychiatric: Appropriate affect, cooperative   Neurologic: Oriented x 3, strength symmetric in all extremities, Cranial Nerves grossly intact to confrontation, speech clear   Skin: No rashes     Result Review    Result Review:  I have  personally reviewed the results from the time of this admission to 7/22/2023 20:39 EDT and agree with these findings:  []  Laboratory list / accordion  []  Microbiology  []  Radiology  []  EKG/Telemetry   []  Cardiology/Vascular   []  Pathology  []  Old records  []  Other:      Assessment & Plan   Assessment / Plan     Brief Patient Summary:  Nelson Wang is a 78 y.o. male who was seen and evaluated the ED for peritoneal dialysis catheter dysfunction    Active Hospital Problems:  Active Hospital Problems    Diagnosis     **Peritoneal dialysis catheter dysfunction      Plan:   Peritoneal dialysis catheter dysfunction  -General surgery consult  -N.p.o. after midnight  -Consider nephrology consult is unable to utilize the new peritoneal catheter    Hypertension  -Continue carvedilol  -Vital signs per nursing    DM2  -Accu-Chek before meals and at bedtime  -Insulin sliding scale    Hyperlipidemia  -Continue atorvastatin    Hypothyroidism  -Continue levothyroxine    DVT prophylaxis:  Mechanical DVT prophylaxis orders are present.    CODE STATUS:    Code Status (Patient has no pulse and is not breathing): CPR (Attempt to Resuscitate)  Medical Interventions (Patient has pulse or is breathing): Full Support  Release to patient: Routine Release    Admission Status:  I believe this patient meets observation status.    minutes has been spent by [facility] providers in the care of this patient while under observation status    During patient visit, I utilized appropriate personal protective equipment including gloves. Appropriate PPE was worn during the entire visit.  Hand hygiene was completed before and after    Electronically signed by BECKY Plasencia, 07/22/23, 8:39 PM EDT.

## 2023-07-23 NOTE — DISCHARGE INSTRUCTIONS
Patient may use this catheter to resume dialysis tonight    May remove dressing over catheter to use- re dress as per pt normal home routine     Steri strips to remain in place

## 2023-07-23 NOTE — PROGRESS NOTES
The MARIANELA and I have discussed this patient's history, physical exam and treatment plan.  I provided a substantive portion of the care of this patient.  I have reviewed the documentation and personally had a face to face interaction with the patient and personally performed the physical exam, in its entirety.  I affirm the documentation and agree with the treatment and plan.  The following describes my personal findings.      The patient presents complaining of malfunction of his peritoneal dialysis catheter, unable to do PD at home.  Patient reports he has been on PD for 3 months without difficulties until recently.  Patient denies fever, nausea, vomiting, pain    Comprehensive Physical exam:  Patient is nontoxic appearing oriented, conversant, well appearing, awake, alert  HEENT: normocephalic, atraumatic  Neck: no goiter, no pain with ROM  Pulmonary: Nontachypneic  cardiovascular: Nontachycardic  Abdomen: Positive bowel sounds, soft, nontender, no guarding or rebound  musculoskeletal: Good range of motion, pulse, sensation x4  Neuro/psychiatric:calm, appropriate, cooperative  Skin:warm, dry      Plan:   Peritoneal dialysis catheter dysfunction  -General surgery consult, plan to go to the OR this morning for manipulation  -N.p.o. after midnight  -Consider nephrology consult if unable to utilize the new peritoneal catheter     Hypertension  -Continue carvedilol  -Vital signs per nursing     DM2  -Accu-Chek before meals and at bedtime  -Insulin sliding scale     Hyperlipidemia  -Continue atorvastatin     Hypothyroidism  -Continue levothyroxine

## 2023-07-24 ENCOUNTER — TRANSITIONAL CARE MANAGEMENT TELEPHONE ENCOUNTER (OUTPATIENT)
Dept: CALL CENTER | Facility: HOSPITAL | Age: 78
End: 2023-07-24
Payer: MEDICARE

## 2023-07-24 NOTE — OUTREACH NOTE
Call Center TCM Note      Flowsheet Row Responses   [facility] patient discharged from? [place]   Does the patient have one of the following disease processes/diagnoses(primary or secondary)? Other   TCM attempt successful? No   Unsuccessful attempts Attempt 2            Maria Esther Kraft MA    7/24/2023, 16:18 EDT

## 2023-07-24 NOTE — OUTREACH NOTE
Call Center TCM Note      Flowsheet Row Responses   [facility] patient discharged from? [place]   Does the patient have one of the following disease processes/diagnoses(primary or secondary)? Other   TCM attempt successful? No  [no verbal release on file]   Unsuccessful attempts Attempt 1            Estephania Arzate RN    7/24/2023, 08:44 EDT

## 2023-07-25 ENCOUNTER — TRANSITIONAL CARE MANAGEMENT TELEPHONE ENCOUNTER (OUTPATIENT)
Dept: CALL CENTER | Facility: HOSPITAL | Age: 78
End: 2023-07-25
Payer: MEDICARE

## 2023-07-25 NOTE — OUTREACH NOTE
Call Center TCM Note      Flowsheet Row Responses   [facility] patient discharged from? [place]   Does the patient have one of the following disease processes/diagnoses(primary or secondary)? Other   TCM attempt successful? No   Unsuccessful attempts Attempt 3            Veronica Hill RN    7/25/2023, 09:27 EDT

## 2023-07-25 NOTE — DISCHARGE SUMMARY
DATE OF ADMISSION:  7/22/2023  DATE OF DISCHARGE:  7/23/2023    ATTENDING:        Radha Oreilly M.D.       GENERAL SURGERY    CONSULTS:    None    PRINCIPAL DIAGNOSIS:     Malfunctioning PD cath    DISCHARGE DIAGNOSES:     Same    HOSPITAL PROCEDURES:     7/23/2023 Revision of PD catheter    HOSPITAL COURSE:   The patient accidentally cut his PD catheter. An extension was placed and tunneled in the OR the following day. It was working well at d/c.    ACTIVITY:  Okay to shower.  Ambulate and climb stairs as tolerated.  No lifting over 10 lbs for 2 weeks.  Okay to drive if not taking pain medications.    DIET:  Regular    FOLLOW UP:  With Dr Oreilly in 2 weeks. Instructed to call [phone] for an appointment.

## 2023-07-31 RX ORDER — LEVOTHYROXINE SODIUM 88 UG/1
88 TABLET ORAL DAILY
Qty: 90 TABLET | Refills: 0 | Status: CANCELLED | OUTPATIENT
Start: 2023-07-31

## 2023-08-02 RX ORDER — LEVOTHYROXINE SODIUM 0.12 MG/1
125 TABLET ORAL DAILY
Qty: 90 TABLET | Refills: 1 | Status: SHIPPED | OUTPATIENT
Start: 2023-08-02

## 2023-08-04 ENCOUNTER — NURSE TRIAGE (OUTPATIENT)
Dept: CALL CENTER | Facility: HOSPITAL | Age: 78
End: 2023-08-04
Payer: MEDICARE

## 2023-08-18 DIAGNOSIS — E11.9 TYPE 2 DIABETES MELLITUS WITHOUT COMPLICATION, WITHOUT LONG-TERM CURRENT USE OF INSULIN: ICD-10-CM

## 2023-08-21 NOTE — TELEPHONE ENCOUNTER
Ok to fill whatever insulin he needs; please call and confirm he actually needs this and it was an automatic pharmacy request

## 2023-08-22 RX ORDER — INSULIN GLARGINE-YFGN 100 [IU]/ML
INJECTION, SOLUTION SUBCUTANEOUS
Qty: 15 ML | Refills: 0 | OUTPATIENT
Start: 2023-08-22

## 2023-08-28 RX ORDER — ATORVASTATIN CALCIUM 40 MG/1
TABLET, FILM COATED ORAL
Qty: 90 TABLET | Refills: 0 | Status: SHIPPED | OUTPATIENT
Start: 2023-08-28

## 2023-09-19 ENCOUNTER — OFFICE VISIT (OUTPATIENT)
Dept: INTERNAL MEDICINE | Facility: CLINIC | Age: 78
End: 2023-09-19
Payer: MEDICARE

## 2023-09-19 VITALS
TEMPERATURE: 97.6 F | DIASTOLIC BLOOD PRESSURE: 50 MMHG | SYSTOLIC BLOOD PRESSURE: 110 MMHG | RESPIRATION RATE: 12 BRPM | WEIGHT: 161.6 LBS | HEIGHT: 66 IN | BODY MASS INDEX: 25.97 KG/M2 | OXYGEN SATURATION: 97 % | HEART RATE: 50 BPM

## 2023-09-19 DIAGNOSIS — J98.11 ATELECTASIS OF RIGHT LUNG: ICD-10-CM

## 2023-09-19 DIAGNOSIS — R53.1 WEAKNESS: ICD-10-CM

## 2023-09-19 DIAGNOSIS — F33.41 RECURRENT MAJOR DEPRESSIVE DISORDER, IN PARTIAL REMISSION: ICD-10-CM

## 2023-09-19 DIAGNOSIS — R06.2 WHEEZING: Primary | ICD-10-CM

## 2023-09-19 DIAGNOSIS — R29.6 FREQUENT FALLS: ICD-10-CM

## 2023-09-19 DIAGNOSIS — S22.42XA CLOSED FRACTURE OF MULTIPLE RIBS OF LEFT SIDE, INITIAL ENCOUNTER: ICD-10-CM

## 2023-09-19 RX ORDER — SERTRALINE HYDROCHLORIDE 100 MG/1
100 TABLET, FILM COATED ORAL NIGHTLY
Qty: 90 TABLET | Refills: 1 | Status: SHIPPED | OUTPATIENT
Start: 2023-09-19

## 2023-09-19 NOTE — PROGRESS NOTES
"Chief Complaint  Follow-up (Follow up on ankle-wheezing in chest yesterday )    Subjective        Nelson Wang presents to [facility] for concerns regarding weakness, frequent falls, wheezing, follow-up on depression.    On Zoloft, needing a refill, taking nightly.    Weakness, frequent falls, has been having more falls at home, would like to get physical therapy.  Not using any assistive devices at home.    Wheezing, not significantly short of breath, has improved some over the last few days, absent today, is on peritoneal dialysis, has pulled more fluid off the last few days.  Mild cough, somewhat productive.    Objective   Vital Signs:   /50 (BP Location: Right arm, Patient Position: Sitting, Cuff Size: Adult)   Pulse 50   Temp 97.6 °F (36.4 °C) (Temporal)   Resp 12   Ht 167.6 cm (65.98\")   Wt 73.3 kg (161 lb 9.6 oz)   SpO2 97%   BMI 26.10 kg/m²     Physical Exam  Vitals and nursing note reviewed.   Constitutional:       Appearance: Normal appearance.   HENT:      Mouth/Throat:      Mouth: Mucous membranes are moist.   Eyes:      Pupils: Pupils are equal, round, and reactive to light.   Cardiovascular:      Rate and Rhythm: Normal rate and regular rhythm.   Pulmonary:      Effort: Pulmonary effort is normal.      Breath sounds: Wheezing present.   Neurological:      Mental Status: He is alert.   Psychiatric:         Mood and Affect: Mood normal.         Thought Content: Thought content normal.      Result Review :  {The following data was reviewed by BECKY Rudd on 09/19/23                Diagnoses and all orders for this visit:    1. Wheezing (Primary)  -     XR Chest PA & Lateral (In Office)    2. Weakness  -     Ambulatory Referral to Physical Therapy Evaluate and treat    3. Frequent falls  -     Ambulatory Referral to Physical Therapy Evaluate and treat    4. Recurrent major depressive disorder, in partial remission    5. Atelectasis of right lung    6. Closed " fracture of multiple ribs of left side, initial encounter    Other orders  -     sertraline (ZOLOFT) 100 MG tablet; Take 1 tablet by mouth Every Night.  Dispense: 90 tablet; Refill: 1    Chest x-ray performed, evidence of atelectasis, left rib fractures, no edema, no pneumonia.  Discussed exercising lungs, using flutter valve, deep breathing exercises.  Can use pillow to brace.  Monitor for worsening symptoms, may need repeat if not improving.    Referred to physical therapy for falls and weakness, discussed using assistive devices, reluctant today, but will be open minded moving forward for safety concerns.    Depression, refilled Zoloft, follow-up as needed with PCP.      Follow Up   No follow-ups on file.  Patient was given instructions and counseling regarding his condition or for health maintenance advice. Please see specific information pulled into the AVS if appropriate.     BECKY Rudd  9/19/2023  This note was electronically signed.

## 2023-09-20 ENCOUNTER — OFFICE VISIT (OUTPATIENT)
Dept: PODIATRY | Facility: CLINIC | Age: 78
End: 2023-09-20
Payer: MEDICARE

## 2023-09-20 VITALS
HEIGHT: 66 IN | SYSTOLIC BLOOD PRESSURE: 90 MMHG | TEMPERATURE: 97.4 F | WEIGHT: 165 LBS | HEART RATE: 65 BPM | BODY MASS INDEX: 26.52 KG/M2 | OXYGEN SATURATION: 91 % | DIASTOLIC BLOOD PRESSURE: 47 MMHG

## 2023-09-20 DIAGNOSIS — B35.1 ONYCHOMYCOSIS: ICD-10-CM

## 2023-09-20 DIAGNOSIS — L60.0 ONYCHOCRYPTOSIS: ICD-10-CM

## 2023-09-20 DIAGNOSIS — E11.8 DIABETIC FOOT: ICD-10-CM

## 2023-09-20 DIAGNOSIS — M79.671 FOOT PAIN, RIGHT: ICD-10-CM

## 2023-09-20 DIAGNOSIS — M12.571 TRAUMATIC ARTHRITIS OF ANKLE, RIGHT: ICD-10-CM

## 2023-09-20 DIAGNOSIS — M79.671 FOOT PAIN, BILATERAL: Primary | ICD-10-CM

## 2023-09-20 DIAGNOSIS — M79.672 FOOT PAIN, BILATERAL: Primary | ICD-10-CM

## 2023-09-20 RX ORDER — BUPIVACAINE HYDROCHLORIDE 5 MG/ML
0.5 INJECTION, SOLUTION PERINEURAL ONCE
Status: COMPLETED | OUTPATIENT
Start: 2023-09-20 | End: 2023-09-20

## 2023-09-20 RX ORDER — TESTOSTERONE CYPIONATE 200 MG/ML
4 INJECTION INTRAMUSCULAR ONCE
Status: COMPLETED | OUTPATIENT
Start: 2023-09-20 | End: 2023-09-20

## 2023-09-20 RX ADMIN — BUPIVACAINE HYDROCHLORIDE 0.5 ML: 5 INJECTION, SOLUTION PERINEURAL at 11:41

## 2023-09-20 RX ADMIN — TESTOSTERONE CYPIONATE 4 MG: 200 INJECTION INTRAMUSCULAR at 11:42
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
## 2023-09-22 RX ORDER — CARVEDILOL 25 MG/1
TABLET ORAL
Qty: 180 TABLET | Refills: 0 | OUTPATIENT
Start: 2023-09-22

## 2023-10-02 RX ORDER — CARVEDILOL 25 MG/1
TABLET ORAL
Qty: 90 TABLET | Refills: 0 | OUTPATIENT
Start: 2023-10-02

## 2023-10-02 NOTE — TELEPHONE ENCOUNTER
The original prescription was discontinued on 3/22/2023 by Logan Nicole MD. Renewing this prescription may not be appropriate.

## 2023-10-17 RX ORDER — CARVEDILOL 25 MG/1
25 TABLET ORAL 2 TIMES DAILY WITH MEALS
Qty: 180 TABLET | Refills: 0 | OUTPATIENT
Start: 2023-10-17

## 2023-10-18 RX ORDER — INSULIN GLARGINE 100 [IU]/ML
INJECTION, SOLUTION SUBCUTANEOUS
Qty: 6 ML | Refills: 0 | Status: SHIPPED | OUTPATIENT
Start: 2023-10-18

## 2023-11-27 RX ORDER — ATORVASTATIN CALCIUM 40 MG/1
TABLET, FILM COATED ORAL
Qty: 90 TABLET | Refills: 0 | OUTPATIENT
Start: 2023-11-27

## 2023-12-06 ENCOUNTER — HOSPITAL ENCOUNTER (OUTPATIENT)
Dept: CT IMAGING | Facility: HOSPITAL | Age: 78
Discharge: HOME | End: 2023-12-06
Admitting: PHYSICIAN ASSISTANT
Payer: MEDICARE

## 2023-12-06 ENCOUNTER — OFFICE VISIT (OUTPATIENT)
Dept: INTERNAL MEDICINE | Facility: CLINIC | Age: 78
End: 2023-12-06
Payer: MEDICARE

## 2023-12-06 VITALS
OXYGEN SATURATION: 95 % | HEART RATE: 65 BPM | TEMPERATURE: 98.2 F | BODY MASS INDEX: 27.26 KG/M2 | DIASTOLIC BLOOD PRESSURE: 70 MMHG | WEIGHT: 168.8 LBS | SYSTOLIC BLOOD PRESSURE: 130 MMHG

## 2023-12-06 DIAGNOSIS — D63.1 ANEMIA DUE TO STAGE 5 CHRONIC KIDNEY DISEASE, NOT ON CHRONIC DIALYSIS: ICD-10-CM

## 2023-12-06 DIAGNOSIS — Z23 ENCOUNTER FOR VACCINATION: ICD-10-CM

## 2023-12-06 DIAGNOSIS — E11.9 TYPE 2 DIABETES MELLITUS WITHOUT COMPLICATION, WITHOUT LONG-TERM CURRENT USE OF INSULIN: ICD-10-CM

## 2023-12-06 DIAGNOSIS — D64.9 ANEMIA, UNSPECIFIED TYPE: ICD-10-CM

## 2023-12-06 DIAGNOSIS — R05.3 CHRONIC COUGH: ICD-10-CM

## 2023-12-06 DIAGNOSIS — N18.5 ANEMIA DUE TO STAGE 5 CHRONIC KIDNEY DISEASE, NOT ON CHRONIC DIALYSIS: ICD-10-CM

## 2023-12-06 DIAGNOSIS — E03.9 HYPOTHYROIDISM, UNSPECIFIED TYPE: ICD-10-CM

## 2023-12-06 DIAGNOSIS — J84.9 INTERSTITIAL LUNG DISEASE: ICD-10-CM

## 2023-12-06 DIAGNOSIS — N18.4 CKD (CHRONIC KIDNEY DISEASE), STAGE IV: ICD-10-CM

## 2023-12-06 DIAGNOSIS — E78.5 HYPERLIPIDEMIA, UNSPECIFIED HYPERLIPIDEMIA TYPE: ICD-10-CM

## 2023-12-06 DIAGNOSIS — I10 ESSENTIAL HYPERTENSION: ICD-10-CM

## 2023-12-06 DIAGNOSIS — Z00.00 MEDICARE ANNUAL WELLNESS VISIT, SUBSEQUENT: Primary | ICD-10-CM

## 2023-12-06 LAB
ALBUMIN SERPL-MCNC: 3.4 G/DL (ref 3.5–5.2)
ALBUMIN/GLOB SERPL: 1 G/DL
ALP SERPL-CCNC: 59 U/L (ref 39–117)
ALT SERPL W P-5'-P-CCNC: 11 U/L (ref 1–41)
ANION GAP SERPL CALCULATED.3IONS-SCNC: 10 MMOL/L (ref 5–15)
AST SERPL-CCNC: 21 U/L (ref 1–40)
BASOPHILS # BLD AUTO: 0.02 10*3/MM3 (ref 0–0.2)
BASOPHILS NFR BLD AUTO: 0.3 % (ref 0–1.5)
BILIRUB SERPL-MCNC: 0.5 MG/DL (ref 0–1.2)
BUN SERPL-MCNC: 37 MG/DL (ref 8–23)
BUN/CREAT SERPL: 7 (ref 7–25)
CALCIUM SPEC-SCNC: 9.1 MG/DL (ref 8.6–10.5)
CHLORIDE SERPL-SCNC: 101 MMOL/L (ref 98–107)
CHOLEST SERPL-MCNC: 113 MG/DL (ref 0–200)
CO2 SERPL-SCNC: 28 MMOL/L (ref 22–29)
CREAT SERPL-MCNC: 5.29 MG/DL (ref 0.76–1.27)
DEPRECATED RDW RBC AUTO: 45.8 FL (ref 37–54)
EGFRCR SERPLBLD CKD-EPI 2021: 10.5 ML/MIN/1.73
EOSINOPHIL # BLD AUTO: 0.47 10*3/MM3 (ref 0–0.4)
EOSINOPHIL NFR BLD AUTO: 6.8 % (ref 0.3–6.2)
ERYTHROCYTE [DISTWIDTH] IN BLOOD BY AUTOMATED COUNT: 13.7 % (ref 12.3–15.4)
GLOBULIN UR ELPH-MCNC: 3.3 GM/DL
GLUCOSE SERPL-MCNC: 150 MG/DL (ref 65–99)
HBA1C MFR BLD: 6 % (ref 4.8–5.6)
HCT VFR BLD AUTO: 29.5 % (ref 37.5–51)
HDLC SERPL-MCNC: 49 MG/DL (ref 40–60)
HGB BLD-MCNC: 9.3 G/DL (ref 13–17.7)
IMM GRANULOCYTES # BLD AUTO: 0.03 10*3/MM3 (ref 0–0.05)
IMM GRANULOCYTES NFR BLD AUTO: 0.4 % (ref 0–0.5)
LDLC SERPL CALC-MCNC: 50 MG/DL (ref 0–100)
LDLC/HDLC SERPL: 1.04 {RATIO}
LYMPHOCYTES # BLD AUTO: 1.91 10*3/MM3 (ref 0.7–3.1)
LYMPHOCYTES NFR BLD AUTO: 27.6 % (ref 19.6–45.3)
MCH RBC QN AUTO: 29.2 PG (ref 26.6–33)
MCHC RBC AUTO-ENTMCNC: 31.5 G/DL (ref 31.5–35.7)
MCV RBC AUTO: 92.8 FL (ref 79–97)
MONOCYTES # BLD AUTO: 0.61 10*3/MM3 (ref 0.1–0.9)
MONOCYTES NFR BLD AUTO: 8.8 % (ref 5–12)
NEUTROPHILS NFR BLD AUTO: 3.89 10*3/MM3 (ref 1.7–7)
NEUTROPHILS NFR BLD AUTO: 56.1 % (ref 42.7–76)
NRBC BLD AUTO-RTO: 0.1 /100 WBC (ref 0–0.2)
PLATELET # BLD AUTO: 147 10*3/MM3 (ref 140–450)
PMV BLD AUTO: 11.9 FL (ref 6–12)
POTASSIUM SERPL-SCNC: 4.4 MMOL/L (ref 3.5–5.2)
PROT SERPL-MCNC: 6.7 G/DL (ref 6–8.5)
RBC # BLD AUTO: 3.18 10*6/MM3 (ref 4.14–5.8)
SODIUM SERPL-SCNC: 139 MMOL/L (ref 136–145)
TRIGL SERPL-MCNC: 64 MG/DL (ref 0–150)
TSH SERPL DL<=0.05 MIU/L-ACNC: 11.4 UIU/ML (ref 0.27–4.2)
VLDLC SERPL-MCNC: 14 MG/DL (ref 5–40)
WBC NRBC COR # BLD AUTO: 6.93 10*3/MM3 (ref 3.4–10.8)

## 2023-12-06 PROCEDURE — 71250 CT THORAX DX C-: CPT

## 2023-12-06 PROCEDURE — 85025 COMPLETE CBC W/AUTO DIFF WBC: CPT | Performed by: PHYSICIAN ASSISTANT

## 2023-12-06 PROCEDURE — 83036 HEMOGLOBIN GLYCOSYLATED A1C: CPT | Performed by: PHYSICIAN ASSISTANT

## 2023-12-06 PROCEDURE — 80061 LIPID PANEL: CPT | Performed by: PHYSICIAN ASSISTANT

## 2023-12-06 PROCEDURE — 84439 ASSAY OF FREE THYROXINE: CPT | Performed by: PHYSICIAN ASSISTANT

## 2023-12-06 PROCEDURE — 80053 COMPREHEN METABOLIC PANEL: CPT | Performed by: PHYSICIAN ASSISTANT

## 2023-12-06 PROCEDURE — 84443 ASSAY THYROID STIM HORMONE: CPT | Performed by: PHYSICIAN ASSISTANT

## 2023-12-06 RX ORDER — METHYLPREDNISOLONE 4 MG/1
TABLET ORAL
Qty: 21 TABLET | Refills: 0 | Status: SHIPPED | OUTPATIENT
Start: 2023-12-06

## 2023-12-06 RX ORDER — SEVELAMER CARBONATE 800 MG/1
1 TABLET, FILM COATED ORAL 3 TIMES DAILY
COMMUNITY
Start: 2023-10-26

## 2023-12-06 NOTE — PROGRESS NOTES
The ABCs of the Annual Wellness Visit  Subsequent Medicare Wellness Visit    Subjective      Nelson Wang is a 78 y.o. male who presents for a Subsequent Medicare Wellness Visit.    The following portions of the patient's history were reviewed and   updated as appropriate: allergies, current medications, past family history, past medical history, past social history, past surgical history, and problem list.    Compared to one year ago, the patient feels his physical   health is worse.    Compared to one year ago, the patient feels his mental   health is worse.    Recent Hospitalizations:  He was admitted within the past 365 days at [facility].       Current Medical Providers:  Patient Care Team:  Janna Mo MD as PCP - General (Internal Medicine)    Outpatient Medications Prior to Visit   Medication Sig Dispense Refill    allopurinol (ZYLOPRIM) 300 MG tablet Take 1 tablet by mouth Daily.      ascorbic acid (VITAMIN C) 1000 MG tablet Take 1 tablet by mouth Daily.      Ascorbic Acid Buffered (Buffered Vitamin C) 1000 MG capsule Take 1 capsule by mouth Daily.      atorvastatin (LIPITOR) 40 MG tablet Take 1 tablet by mouth once daily 90 tablet 0    carvedilol (COREG) 12.5 MG tablet Take 1 tablet by mouth 2 (Two) Times a Day. 180 tablet 3    clobetasol (TEMOVATE) 0.05 % ointment       Lantus SoloStar 100 UNIT/ML injection pen INJECT 20 UNITS SUBCUTANEOUSLY ONCE DAILY 6 mL 0    levothyroxine (SYNTHROID, LEVOTHROID) 125 MCG tablet Take 1 tablet by mouth Daily. 90 tablet 1    Methoxy PEG-Epoetin Beta (MIRCERA IJ) Inject 100 mcg under the skin into the appropriate area as directed.      Omega-3 Fatty Acids (fish oil) 1000 MG capsule capsule Take 1 capsule by mouth Daily With Breakfast.      Risankizumab-rzaa (SKYRIZI, 150 MG DOSE, SC) Inject  under the skin into the appropriate area as directed. Once every 3 months      sertraline (ZOLOFT) 100 MG tablet Take 1 tablet by mouth Every Night. 90  "tablet 1    sevelamer (RENVELA) 800 MG tablet Take 1 tablet by mouth 3 (Three) Times a Day.      Torsemide 40 MG tablet Take 40 mg by mouth Daily.      Turmeric 500 MG capsule Take 1 capsule by mouth 2 (Two) Times a Day.      gentamicin (GARAMYCIN) 0.1 % cream APPLY SMALL AMOUNT TO THE AFFECTED AREA AS DIRECTED (Patient not taking: Reported on 12/6/2023)       No facility-administered medications prior to visit.       No opioid medication identified on active medication list. I have reviewed chart for other potential  high risk medication/s and harmful drug interactions in the elderly.        Aspirin is not on active medication list.  Aspirin use is not indicated based on review of current medical condition/s. Risk of harm outweighs potential benefits.  .    Patient Active Problem List   Diagnosis    Essential hypertension    Bradycardia, sinus    Anemia due to chronic kidney disease    Hypothyroidism    Idiopathic gout    Proteinuria    Psoriasis    Thrombocytopenia    Type 2 diabetes mellitus without complication    CKD (chronic kidney disease), stage IV    Hyperlipidemia    Monoclonal gammopathy of unknown significance (MGUS)    Stage 5 chronic kidney disease    Peritoneal dialysis catheter in place    Chronic gout without tophus    Peritoneal dialysis catheter dysfunction    Medicare annual wellness visit, subsequent    Chronic cough     Advance Care Planning   Advance Care Planning     Advance Directive is not on file.  ACP discussion was held with the patient during this visit. Patient has an advance directive (not in EMR), copy requested.     Objective    Vitals:    12/06/23 0854   BP: 130/70   BP Location: Left arm   Patient Position: Sitting   Cuff Size: Adult   Pulse: 65   Temp: 98.2 °F (36.8 °C)   TempSrc: Temporal   SpO2: 95%   Weight: 76.6 kg (168 lb 12.8 oz)     Estimated body mass index is 27.26 kg/m² as calculated from the following:    Height as of 9/20/23: 167.6 cm (65.98\").    Weight as of this " encounter: 76.6 kg (168 lb 12.8 oz).           Does the patient have evidence of cognitive impairment?   No    Lab Results   Component Value Date    TRIG 64 2023    HDL 49 2023    LDL 50 2023    VLDL 14 2023    HGBA1C 6.00 (H) 2023          HEALTH RISK ASSESSMENT    Smoking Status:  Social History     Tobacco Use   Smoking Status Former    Packs/day: 1.00    Years: 10.00    Additional pack years: 0.00    Total pack years: 10.00    Types: Cigarettes    Start date:     Quit date: 2000    Years since quittin.9    Passive exposure: Past   Smokeless Tobacco Never   Tobacco Comments    quit 25 years ago, chews nicotine gum     Alcohol Consumption:  Social History     Substance and Sexual Activity   Alcohol Use Yes    Comment: 1 DRINK/DAY     Fall Risk Screen:    JENNIFER Fall Risk Assessment was completed, and patient is at HIGH risk for falls. Assessment completed on:2023    Depression Screenin/6/2023     9:14 AM   PHQ-2/PHQ-9 Depression Screening   Little Interest or Pleasure in Doing Things 0-->not at all   Feeling Down, Depressed or Hopeless 0-->not at all   PHQ-9: Brief Depression Severity Measure Score 0       Health Habits and Functional and Cognitive Screenin/6/2023     9:00 AM   Functional & Cognitive Status   Do you have difficulty preparing food and eating? No   Do you have difficulty bathing yourself, getting dressed or grooming yourself? No   Do you have difficulty using the toilet? No   Do you have difficulty moving around from place to place? No   Do you have trouble with steps or getting out of a bed or a chair? No   Current Diet Well Balanced Diet   Dental Exam Not up to date   Eye Exam Not up to date   Exercise (times per week) 0 times per week   Current Exercises Include No Regular Exercise   Do you need help using the phone?  No   Are you deaf or do you have serious difficulty hearing?  Yes   Do you need help to go to places out of walking  distance? No   Do you need help shopping? No   Do you need help preparing meals?  No   Do you need help with housework?  No   Do you need help with laundry? No   Do you need help taking your medications? No   Do you need help managing money? No   Do you ever drive or ride in a car without wearing a seat belt? No   Have you felt unusual stress, anger or loneliness in the last month? No   Who do you live with? Spouse   If you need help, do you have trouble finding someone available to you? No   Have you been bothered in the last four weeks by sexual problems? No   Do you have difficulty concentrating, remembering or making decisions? No       Age-appropriate Screening Schedule:  Refer to the list below for future screening recommendations based on patient's age, sex and/or medical conditions. Orders for these recommended tests are listed in the plan section. The patient has been provided with a written plan.    Health Maintenance   Topic Date Due    TDAP/TD VACCINES (1 - Tdap) Never done    ZOSTER VACCINE (1 of 2) Never done    DIABETIC EYE EXAM  11/04/2021    ANNUAL WELLNESS VISIT  12/05/2023    URINE MICROALBUMIN  03/07/2024    HEMOGLOBIN A1C  06/06/2024    BMI FOLLOWUP  09/19/2024    LIPID PANEL  12/06/2024    HEPATITIS C SCREENING  Completed    Hepatitis B  Completed    COVID-19 Vaccine  Completed    INFLUENZA VACCINE  Completed    Pneumococcal Vaccine 65+  Completed    COLORECTAL CANCER SCREENING  Discontinued                  CMS Preventative Services Quick Reference  Risk Factors Identified During Encounter:    Immunizations Discussed/Encouraged: COVID19    The above risks/problems have been discussed with the patient.  Pertinent information has been shared with the patient in the After Visit Summary.    Diagnoses and all orders for this visit:    1. Medicare annual wellness visit, subsequent (Primary)  Assessment & Plan:  Reviewed preventative medication recommendations that are age appropriate for the  patient. Education provided for health and wellness. Encouraged healthy diet, regular exercise, and routine wellness checkups.        2. Chronic cough  Assessment & Plan:  Discussed ddx with pt and Dr. Mo. Will get CT chest to al. Shows interstitual lung disease and bronchoilitis. Will tx with steroids and refer to pulm. Return to clinic 2 wks to make sure sx improved.    Orders:  -     CT Chest Without Contrast; Future  -     Ambulatory Referral to Pulmonology    3. CKD (chronic kidney disease), stage IV  -     Comprehensive Metabolic Panel  -     CBC & Differential  -     TSH    4. Anemia due to stage 5 chronic kidney disease, not on chronic dialysis  Comments:  Cont follow up with nephrology as directed.    5. Type 2 diabetes mellitus without complication, without long-term current use of insulin  Comments:  Labs today.  Orders:  -     Hemoglobin A1c    6. Essential hypertension  Assessment & Plan:  Hypertension is improving with treatment.  Continue current treatment regimen.  Blood pressure will be reassessed at the next regular appointment.      7. Hyperlipidemia, unspecified hyperlipidemia type  -     Lipid Panel    8. Anemia, unspecified type  -     TSH    9. Hypothyroidism, unspecified type  Comments:  Labs today, will adjust dose if indicated on results.  Orders:  -     T4, free; Future  -     T4, free    10. Encounter for vaccination  -     COVID-19 F23 (Pfizer) 12yrs+ (COMIRNATY)    11. Interstitial lung disease  -     Ambulatory Referral to Pulmonology    Other orders  -     methylPREDNISolone (MEDROL) 4 MG dose pack; Take as directed on package instructions.  Dispense: 21 tablet; Refill: 0        Follow Up:   Next Medicare Wellness visit to be scheduled in 1 year.      An After Visit Summary and PPPS were made available to the patient.

## 2023-12-06 NOTE — PROGRESS NOTES
Chief Complaint  Wheezing (Had a falling accident  2 months ago. Seen Scottie Lundberg. Had x-rays few cracks in the rubs. Every since then been having a wheezing cough since. More pain on the left side. Wants a CAT Scan and want to go to pulminary specialist. )    Subjective          Nelson Wang presents to [facility]  History of Present Illness  Pt states 2 months ago he was working in the garden and fell on L side. He had large bruising.   CXR showed 3-5 fracture  He has been having pain in ribs since then.   He has noticed more wheezing atfter putting 2 L of fluid into PD. Also worse if laying on L side.  He has wheezing when he is breathing out  He has had cough on and off x 2 months   Cough is productive.   He has heard wheezing  He has finished amoxicillin and Levaquin with minimal improvement each time. Also tried mucinex  He has had low grade fever off and on the past 2 months.  Denies resp distress  Feels short of breath when laying on left side  Denies swelling in legs, weight gain   HTN: Denies chest pain, palpitations, dizziness, syncope  Hypothyroid: takes synthroid daily  CKD: PD dialysis, sees nephro. No recent issues.  HLD: takes statin daily  No sick contacts     Past Medical History:   Diagnosis Date    Anemia     Ankle pain, right     CKD (chronic kidney disease), stage IV     Diabetes     Gout     High cholesterol     HL (hearing loss)     Hyperkalemia     Hypertension     Hypothyroidism     Psoriasis     Vitiligo         Past Surgical History:   Procedure Laterality Date    ANKLE SURGERY  11/1990    APPENDECTOMY N/A 1954    COLONOSCOPY N/A 06/2022    [facility]    HIP BIPOLAR REPLACEMENT Right 01/2000    INSERTION PERITONEAL DIALYSIS CATHETER N/A 3/27/2023    Procedure: LAPAROSCOPIC INSERTION PERITONEAL DIALYSIS CATHETER, LAPAROSCOPIC OMENTOPEXY WITH LYSIS OF ADHESIONS;  Surgeon: Jose Berry MD;  Location: [facility] OR;   Service: General;  Laterality: N/A;    INSERTION PERITONEAL DIALYSIS CATHETER Left 7/23/2023    Procedure: REVISION OF PERITONEAL DIALYSIS CATHETER;  Surgeon: Radha Oreilly MD;  Location: [facility];  Service: General;  Laterality: Left;    RENAL BIOPSY Left 07/15/2022        Current Outpatient Medications on File Prior to Visit   Medication Sig Dispense Refill    allopurinol (ZYLOPRIM) 300 MG tablet Take 1 tablet by mouth Daily.      ascorbic acid (VITAMIN C) 1000 MG tablet Take 1 tablet by mouth Daily.      Ascorbic Acid Buffered (Buffered Vitamin C) 1000 MG capsule Take 1 capsule by mouth Daily.      atorvastatin (LIPITOR) 40 MG tablet Take 1 tablet by mouth once daily 90 tablet 0    carvedilol (COREG) 12.5 MG tablet Take 1 tablet by mouth 2 (Two) Times a Day. 180 tablet 3    clobetasol (TEMOVATE) 0.05 % ointment       Lantus SoloStar 100 UNIT/ML injection pen INJECT 20 UNITS SUBCUTANEOUSLY ONCE DAILY 6 mL 0    levothyroxine (SYNTHROID, LEVOTHROID) 125 MCG tablet Take 1 tablet by mouth Daily. 90 tablet 1    Methoxy PEG-Epoetin Beta (MIRCERA IJ) Inject 100 mcg under the skin into the appropriate area as directed.      Omega-3 Fatty Acids (fish oil) 1000 MG capsule capsule Take 1 capsule by mouth Daily With Breakfast.      Risankizumab-rzaa (SKYRIZI, 150 MG DOSE, SC) Inject  under the skin into the appropriate area as directed. Once every 3 months      sertraline (ZOLOFT) 100 MG tablet Take 1 tablet by mouth Every Night. 90 tablet 1    sevelamer (RENVELA) 800 MG tablet Take 1 tablet by mouth 3 (Three) Times a Day.      Torsemide 40 MG tablet Take 40 mg by mouth Daily.      Turmeric 500 MG capsule Take 1 capsule by mouth 2 (Two) Times a Day.       No current facility-administered medications on file prior to visit.        No Known Allergies    Social History     Tobacco Use   Smoking Status Former    Packs/day: 1.00    Years: 10.00    Additional pack years: 0.00    Total pack years: 10.00    Types:  Cigarettes    Start date:     Quit date: 2000    Years since quittin.9    Passive exposure: Past   Smokeless Tobacco Never   Tobacco Comments    quit 25 years ago, chews nicotine gum          Objective   Vital Signs:   /70 (BP Location: Left arm, Patient Position: Sitting, Cuff Size: Adult)   Pulse 65   Temp 98.2 °F (36.8 °C) (Temporal)   Wt 76.6 kg (168 lb 12.8 oz)   SpO2 95%   BMI 27.26 kg/m²     Physical Exam  Vitals reviewed.   Constitutional:       Appearance: Normal appearance.   HENT:      Head: Normocephalic and atraumatic.      Nose: Nose normal.      Mouth/Throat:      Mouth: Mucous membranes are moist.   Eyes:      Extraocular Movements: Extraocular movements intact.      Conjunctiva/sclera: Conjunctivae normal.      Pupils: Pupils are equal, round, and reactive to light.   Cardiovascular:      Rate and Rhythm: Normal rate and regular rhythm.   Pulmonary:      Effort: Pulmonary effort is normal.      Breath sounds: Normal breath sounds.   Abdominal:      General: Abdomen is flat. Bowel sounds are normal.      Palpations: Abdomen is soft.   Musculoskeletal:         General: Normal range of motion.      Right foot: No deformity.      Left foot: No deformity.   Feet:      Right foot:      Skin integrity: No ulcer, blister, skin breakdown, erythema, callus, dry skin or fissure.      Left foot:      Skin integrity: No ulcer, blister, skin breakdown, erythema, callus, dry skin or fissure.   Neurological:      General: No focal deficit present.      Mental Status: He is alert and oriented to person, place, and time.   Psychiatric:         Mood and Affect: Mood normal.        Result Review :                        Assessment and Plan    Diagnoses and all orders for this visit:    1. Medicare annual wellness visit, subsequent (Primary)  Assessment & Plan:  Reviewed preventative medication recommendations that are age appropriate for the patient. Education provided for health and wellness.  Encouraged healthy diet, regular exercise, and routine wellness checkups.        2. Chronic cough  Assessment & Plan:  Discussed ddx with pt and Dr. Mo. Will get CT chest to eval. Shows interstitual lung disease and bronchoilitis. Will tx with steroids and refer to pulm. Return to clinic 2 wks to make sure sx improved.    Orders:  -     CT Chest Without Contrast; Future  -     Ambulatory Referral to Pulmonology    3. CKD (chronic kidney disease), stage IV  -     Comprehensive Metabolic Panel  -     CBC & Differential  -     TSH    4. Anemia due to stage 5 chronic kidney disease, not on chronic dialysis  Comments:  Cont follow up with nephrology as directed.    5. Type 2 diabetes mellitus without complication, without long-term current use of insulin  Comments:  Labs today.  Orders:  -     Hemoglobin A1c    6. Essential hypertension  Assessment & Plan:  Hypertension is improving with treatment.  Continue current treatment regimen.  Blood pressure will be reassessed at the next regular appointment.      7. Hyperlipidemia, unspecified hyperlipidemia type  -     Lipid Panel    8. Anemia, unspecified type  -     TSH    9. Hypothyroidism, unspecified type  Comments:  Labs today, will adjust dose if indicated on results.  Orders:  -     T4, free; Future  -     T4, free    10. Encounter for vaccination  -     COVID-19 F23 (Pfizer) 12yrs+ (COMIRNATY)    11. Interstitial lung disease  -     Ambulatory Referral to Pulmonology    Other orders  -     methylPREDNISolone (MEDROL) 4 MG dose pack; Take as directed on package instructions.  Dispense: 21 tablet; Refill: 0        Follow Up   Return in about 2 weeks (around 12/20/2023).  Patient was given instructions and counseling regarding his condition or for health maintenance advice. Please see specific information pulled into the AVS if appropriate.

## 2023-12-07 LAB — T4 FREE SERPL-MCNC: 1.12 NG/DL (ref 0.93–1.7)

## 2023-12-08 ENCOUNTER — TELEPHONE (OUTPATIENT)
Dept: INTERNAL MEDICINE | Facility: CLINIC | Age: 78
End: 2023-12-08
Payer: MEDICARE

## 2023-12-08 PROBLEM — Z00.00 MEDICARE ANNUAL WELLNESS VISIT, SUBSEQUENT: Status: ACTIVE | Noted: 2023-12-08

## 2023-12-08 PROBLEM — R05.3 CHRONIC COUGH: Status: ACTIVE | Noted: 2023-12-08

## 2023-12-08 NOTE — TELEPHONE ENCOUNTER
I called and let her know that the referral was sent and that pulmonology would be contacting her to set up an appointment.

## 2023-12-08 NOTE — ASSESSMENT & PLAN NOTE
Discussed ddx with pt and Dr. Mo. Will get CT chest to eval. Shows interstitual lung disease and bronchoilitis. Will tx with steroids and refer to pulm. Return to clinic 2 wks to make sure sx improved.

## 2023-12-08 NOTE — TELEPHONE ENCOUNTER
Caller: Jose Wang    Relationship: Emergency Contact    Best call back number:     [phone]      What is the medical concern/diagnosis: INTERSTITIAL THICKENING, RIGHT UPPER LOBE PNEUMONIA    What specialty or service is being requested: PULMONOLOGY    What is the provider, practice or medical service name: [facility]        Any additional details: PATIENT'S DAUGHTER REQUESTS THAT YOU PLEASE CALL HER TO MAKE THE APPOINTMENT

## 2024-01-05 RX ORDER — ATORVASTATIN CALCIUM 40 MG/1
TABLET, FILM COATED ORAL
Qty: 90 TABLET | Refills: 0 | OUTPATIENT
Start: 2024-01-05

## 2024-01-08 RX ORDER — ATORVASTATIN CALCIUM 40 MG/1
40 TABLET, FILM COATED ORAL DAILY
Qty: 90 TABLET | Refills: 0 | Status: SHIPPED | OUTPATIENT
Start: 2024-01-08

## 2024-01-11 NOTE — TELEPHONE ENCOUNTER
Protocols failed     Antihyperglycemics Protocol Ezdtyi02/10/2024 02:16 PM   Protocol Details GFR >30ml/min in past year    Lipid panel in past year    HgA1c in past 6 months    Recent or future visit with authorizing provider   Insulin Protocol Failed   Protocol Details Most recent eGFR is above 30 in the past 12 months.    Lipid profile in past 12 months    No positive pregnancy test in past 12 months    Recent or future visit with prescriber (6M/30D)    A1c in past 6 months

## 2024-01-12 RX ORDER — INSULIN GLARGINE 100 [IU]/ML
INJECTION, SOLUTION SUBCUTANEOUS
Qty: 6 ML | Refills: 0 | Status: SHIPPED | OUTPATIENT
Start: 2024-01-12

## 2024-01-17 ENCOUNTER — TELEPHONE (OUTPATIENT)
Dept: GASTROENTEROLOGY | Facility: CLINIC | Age: 79
End: 2024-01-17

## 2024-01-17 ENCOUNTER — OFFICE VISIT (OUTPATIENT)
Dept: PODIATRY | Facility: CLINIC | Age: 79
End: 2024-01-17
Payer: MEDICARE

## 2024-01-17 VITALS
DIASTOLIC BLOOD PRESSURE: 63 MMHG | OXYGEN SATURATION: 95 % | SYSTOLIC BLOOD PRESSURE: 116 MMHG | HEIGHT: 66 IN | BODY MASS INDEX: 27 KG/M2 | HEART RATE: 71 BPM | WEIGHT: 168 LBS | TEMPERATURE: 97.8 F

## 2024-01-17 DIAGNOSIS — M79.672 FOOT PAIN, BILATERAL: Primary | ICD-10-CM

## 2024-01-17 DIAGNOSIS — L60.0 ONYCHOCRYPTOSIS: ICD-10-CM

## 2024-01-17 DIAGNOSIS — M79.671 FOOT PAIN, BILATERAL: Primary | ICD-10-CM

## 2024-01-17 DIAGNOSIS — M12.571 TRAUMATIC ARTHRITIS OF ANKLE, RIGHT: ICD-10-CM

## 2024-01-17 DIAGNOSIS — B35.1 ONYCHOMYCOSIS: ICD-10-CM

## 2024-01-17 DIAGNOSIS — E11.9 NON-INSULIN DEPENDENT TYPE 2 DIABETES MELLITUS: ICD-10-CM

## 2024-01-17 DIAGNOSIS — E11.8 DIABETIC FOOT: ICD-10-CM

## 2024-01-17 DIAGNOSIS — M79.671 FOOT PAIN, RIGHT: ICD-10-CM

## 2024-01-17 RX ORDER — BUPIVACAINE HYDROCHLORIDE 5 MG/ML
0.5 INJECTION, SOLUTION PERINEURAL ONCE
Status: COMPLETED | OUTPATIENT
Start: 2024-01-17 | End: 2024-01-17

## 2024-01-17 RX ORDER — TESTOSTERONE CYPIONATE 200 MG/ML
4 INJECTION INTRAMUSCULAR ONCE
Status: COMPLETED | OUTPATIENT
Start: 2024-01-17 | End: 2024-01-17

## 2024-01-17 RX ADMIN — TESTOSTERONE CYPIONATE 4 MG: 200 INJECTION INTRAMUSCULAR at 12:28

## 2024-01-17 RX ADMIN — BUPIVACAINE HYDROCHLORIDE 0.5 ML: 5 INJECTION, SOLUTION PERINEURAL at 12:29
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
## 2024-01-17 NOTE — TELEPHONE ENCOUNTER
Called Nelson Wang 1945 to confirm their appointment with Maximino Peraza MD on 01.17.24@ 3:45. I was unable to reach the patient to confirm the appointment. The patient has been made aware of our 24 hour cancellation policy. [facility]

## 2024-02-09 ENCOUNTER — TELEPHONE (OUTPATIENT)
Dept: CARDIOLOGY | Facility: CLINIC | Age: 79
End: 2024-02-09
Payer: MEDICARE

## 2024-02-16 ENCOUNTER — APPOINTMENT (OUTPATIENT)
Dept: GENERAL RADIOLOGY | Facility: HOSPITAL | Age: 79
DRG: 871 | End: 2024-02-16
Payer: MEDICARE

## 2024-02-16 ENCOUNTER — APPOINTMENT (OUTPATIENT)
Dept: CT IMAGING | Facility: HOSPITAL | Age: 79
DRG: 871 | End: 2024-02-16
Payer: MEDICARE

## 2024-02-16 ENCOUNTER — HOSPITAL ENCOUNTER (INPATIENT)
Facility: HOSPITAL | Age: 79
LOS: 8 days | Discharge: HOME HEALTH | DRG: 871 | End: 2024-02-24
Attending: EMERGENCY MEDICINE | Admitting: INTERNAL MEDICINE
Payer: MEDICARE

## 2024-02-16 ENCOUNTER — APPOINTMENT (OUTPATIENT)
Dept: CARDIOLOGY | Facility: HOSPITAL | Age: 79
DRG: 871 | End: 2024-02-16
Payer: MEDICARE

## 2024-02-16 DIAGNOSIS — R50.9 FEBRILE ILLNESS: Primary | ICD-10-CM

## 2024-02-16 DIAGNOSIS — R53.1 WEAKNESS: ICD-10-CM

## 2024-02-16 DIAGNOSIS — R26.2 DIFFICULTY IN WALKING: ICD-10-CM

## 2024-02-16 DIAGNOSIS — A41.9 SEPSIS, DUE TO UNSPECIFIED ORGANISM, UNSPECIFIED WHETHER ACUTE ORGAN DYSFUNCTION PRESENT: ICD-10-CM

## 2024-02-16 DIAGNOSIS — R13.10 DYSPHAGIA, UNSPECIFIED TYPE: ICD-10-CM

## 2024-02-16 DIAGNOSIS — J15.5 PNEUMONIA OF RIGHT LOWER LOBE DUE TO ESCHERICHIA COLI: ICD-10-CM

## 2024-02-16 DIAGNOSIS — A04.72 C. DIFFICILE COLITIS: ICD-10-CM

## 2024-02-16 DIAGNOSIS — N18.6 END STAGE RENAL DISEASE: ICD-10-CM

## 2024-02-16 PROBLEM — K65.9 PERITONITIS: Status: ACTIVE | Noted: 2024-02-16

## 2024-02-16 LAB
027 TOXIN: ABNORMAL
ALBUMIN SERPL-MCNC: 3 G/DL (ref 3.5–5.2)
ALBUMIN/GLOB SERPL: 1.1 G/DL
ALP SERPL-CCNC: 60 U/L (ref 39–117)
ALT SERPL W P-5'-P-CCNC: 9 U/L (ref 1–41)
ANION GAP SERPL CALCULATED.3IONS-SCNC: 15.8 MMOL/L (ref 5–15)
APPEARANCE FLD: CLEAR
AST SERPL-CCNC: 15 U/L (ref 1–40)
B PARAPERT DNA SPEC QL NAA+PROBE: NOT DETECTED
B PERT DNA SPEC QL NAA+PROBE: NOT DETECTED
BACTERIA BLD CULT: ABNORMAL
BACTERIA UR QL AUTO: ABNORMAL /HPF
BASOPHILS # BLD AUTO: 0.01 10*3/MM3 (ref 0–0.2)
BASOPHILS NFR BLD AUTO: 0.2 % (ref 0–1.5)
BILIRUB SERPL-MCNC: 0.6 MG/DL (ref 0–1.2)
BILIRUB UR QL STRIP: NEGATIVE
BOTTLE TYPE: ABNORMAL
BUN SERPL-MCNC: 38 MG/DL (ref 8–23)
BUN/CREAT SERPL: 5.3 (ref 7–25)
C DIFF GDH + TOXINS A+B STL QL IA.RAPID: NEGATIVE
C DIFF TOX GENS STL QL NAA+PROBE: POSITIVE
C PNEUM DNA NPH QL NAA+NON-PROBE: NOT DETECTED
CALCIUM SPEC-SCNC: 8.3 MG/DL (ref 8.6–10.5)
CHLORIDE SERPL-SCNC: 95 MMOL/L (ref 98–107)
CLARITY UR: CLEAR
CO2 SERPL-SCNC: 24.2 MMOL/L (ref 22–29)
COLOR FLD: COLORLESS
COLOR UR: ABNORMAL
CREAT SERPL-MCNC: 7.22 MG/DL (ref 0.76–1.27)
CRP SERPL-MCNC: 8.9 MG/DL (ref 0–0.5)
D-LACTATE SERPL-SCNC: 3.4 MMOL/L (ref 0.5–2)
D-LACTATE SERPL-SCNC: 3.4 MMOL/L (ref 0.5–2)
D-LACTATE SERPL-SCNC: 4.4 MMOL/L (ref 0.5–2)
D-LACTATE SERPL-SCNC: 5.3 MMOL/L (ref 0.5–2)
DEPRECATED RDW RBC AUTO: 50.6 FL (ref 37–54)
EGFRCR SERPLBLD CKD-EPI 2021: 7.2 ML/MIN/1.73
EOSINOPHIL # BLD AUTO: 0.01 10*3/MM3 (ref 0–0.4)
EOSINOPHIL NFR BLD AUTO: 0.2 % (ref 0.3–6.2)
ERYTHROCYTE [DISTWIDTH] IN BLOOD BY AUTOMATED COUNT: 14.9 % (ref 12.3–15.4)
FLUAV SUBTYP SPEC NAA+PROBE: NOT DETECTED
FLUAV SUBTYP SPEC NAA+PROBE: NOT DETECTED
FLUBV RNA ISLT QL NAA+PROBE: NOT DETECTED
FLUBV RNA ISLT QL NAA+PROBE: NOT DETECTED
GLOBULIN UR ELPH-MCNC: 2.7 GM/DL
GLUCOSE BLDC GLUCOMTR-MCNC: 106 MG/DL (ref 70–99)
GLUCOSE SERPL-MCNC: 96 MG/DL (ref 65–99)
GLUCOSE UR STRIP-MCNC: NEGATIVE MG/DL
HADV DNA SPEC NAA+PROBE: NOT DETECTED
HCOV 229E RNA SPEC QL NAA+PROBE: NOT DETECTED
HCOV HKU1 RNA SPEC QL NAA+PROBE: NOT DETECTED
HCOV NL63 RNA SPEC QL NAA+PROBE: NOT DETECTED
HCOV OC43 RNA SPEC QL NAA+PROBE: NOT DETECTED
HCT VFR BLD AUTO: 34.2 % (ref 37.5–51)
HGB BLD-MCNC: 10.9 G/DL (ref 13–17.7)
HGB UR QL STRIP.AUTO: NEGATIVE
HMPV RNA NPH QL NAA+NON-PROBE: NOT DETECTED
HPIV1 RNA ISLT QL NAA+PROBE: NOT DETECTED
HPIV2 RNA SPEC QL NAA+PROBE: NOT DETECTED
HPIV3 RNA NPH QL NAA+PROBE: NOT DETECTED
HPIV4 P GENE NPH QL NAA+PROBE: NOT DETECTED
HYALINE CASTS UR QL AUTO: ABNORMAL /LPF
IMM GRANULOCYTES # BLD AUTO: 0.03 10*3/MM3 (ref 0–0.05)
IMM GRANULOCYTES NFR BLD AUTO: 0.6 % (ref 0–0.5)
KETONES UR QL STRIP: ABNORMAL
L PNEUMO1 AG UR QL IA: NEGATIVE
LEUKOCYTE ESTERASE UR QL STRIP.AUTO: NEGATIVE
LYMPHOCYTES # BLD AUTO: 0.8 10*3/MM3 (ref 0.7–3.1)
LYMPHOCYTES NFR BLD AUTO: 15 % (ref 19.6–45.3)
LYMPHOCYTES NFR FLD MANUAL: 20 %
M PNEUMO IGG SER IA-ACNC: NOT DETECTED
MACROPHAGE FLUID: 16 %
MCH RBC QN AUTO: 30.2 PG (ref 26.6–33)
MCHC RBC AUTO-ENTMCNC: 31.9 G/DL (ref 31.5–35.7)
MCV RBC AUTO: 94.7 FL (ref 79–97)
MONOCYTES # BLD AUTO: 0.37 10*3/MM3 (ref 0.1–0.9)
MONOCYTES NFR BLD AUTO: 6.9 % (ref 5–12)
MONOCYTES NFR FLD: 8 %
MRSA DNA SPEC QL NAA+PROBE: NORMAL
NEUTROPHILS NFR BLD AUTO: 4.12 10*3/MM3 (ref 1.7–7)
NEUTROPHILS NFR BLD AUTO: 77.1 % (ref 42.7–76)
NEUTROPHILS NFR FLD MANUAL: 56 %
NITRITE UR QL STRIP: NEGATIVE
NRBC BLD AUTO-RTO: 0 /100 WBC (ref 0–0.2)
NT-PROBNP SERPL-MCNC: 2200 PG/ML (ref 0–1800)
NUC CELL # FLD: 27 /MM3
PH UR STRIP.AUTO: 5.5 [PH] (ref 5–8)
PLATELET # BLD AUTO: 102 10*3/MM3 (ref 140–450)
PMV BLD AUTO: 11 FL (ref 6–12)
POTASSIUM SERPL-SCNC: 3.1 MMOL/L (ref 3.5–5.2)
PROCALCITONIN SERPL-MCNC: 1.9 NG/ML (ref 0–0.25)
PROT SERPL-MCNC: 5.7 G/DL (ref 6–8.5)
PROT UR QL STRIP: ABNORMAL
RBC # BLD AUTO: 3.61 10*6/MM3 (ref 4.14–5.8)
RBC # FLD AUTO: <2000 /MM3
RBC # UR STRIP: ABNORMAL /HPF
REF LAB TEST METHOD: ABNORMAL
RHINOVIRUS RNA SPEC NAA+PROBE: NOT DETECTED
RSV RNA NPH QL NAA+NON-PROBE: NOT DETECTED
RSV RNA NPH QL NAA+NON-PROBE: NOT DETECTED
S PNEUM AG SPEC QL LA: NEGATIVE
SARS-COV-2 RNA RESP QL NAA+PROBE: NOT DETECTED
SARS-COV-2 RNA RESP QL NAA+PROBE: NOT DETECTED
SODIUM SERPL-SCNC: 135 MMOL/L (ref 136–145)
SP GR UR STRIP: 1.02 (ref 1–1.03)
SQUAMOUS #/AREA URNS HPF: ABNORMAL /HPF
T4 FREE SERPL-MCNC: 1.4 NG/DL (ref 0.93–1.7)
TSH SERPL DL<=0.05 MIU/L-ACNC: 7.23 UIU/ML (ref 0.27–4.2)
UROBILINOGEN UR QL STRIP: ABNORMAL
WBC # UR STRIP: ABNORMAL /HPF
WBC NRBC COR # BLD AUTO: 5.34 10*3/MM3 (ref 3.4–10.8)
YEAST URNS QL MICRO: ABNORMAL /HPF

## 2024-02-16 PROCEDURE — 25010000002 VANCOMYCIN 5 G RECONSTITUTED SOLUTION: Performed by: EMERGENCY MEDICINE

## 2024-02-16 PROCEDURE — 99285 EMERGENCY DEPT VISIT HI MDM: CPT

## 2024-02-16 PROCEDURE — 82948 REAGENT STRIP/BLOOD GLUCOSE: CPT

## 2024-02-16 PROCEDURE — 25010000002 CEFEPIME PER 500 MG: Performed by: EMERGENCY MEDICINE

## 2024-02-16 PROCEDURE — 87493 C DIFF AMPLIFIED PROBE: CPT | Performed by: NURSE PRACTITIONER

## 2024-02-16 PROCEDURE — 94660 CPAP INITIATION&MGMT: CPT

## 2024-02-16 PROCEDURE — 36415 COLL VENOUS BLD VENIPUNCTURE: CPT | Performed by: EMERGENCY MEDICINE

## 2024-02-16 PROCEDURE — 71045 X-RAY EXAM CHEST 1 VIEW: CPT

## 2024-02-16 PROCEDURE — 87015 SPECIMEN INFECT AGNT CONCNTJ: CPT | Performed by: EMERGENCY MEDICINE

## 2024-02-16 PROCEDURE — 99291 CRITICAL CARE FIRST HOUR: CPT | Performed by: INTERNAL MEDICINE

## 2024-02-16 PROCEDURE — 87040 BLOOD CULTURE FOR BACTERIA: CPT | Performed by: EMERGENCY MEDICINE

## 2024-02-16 PROCEDURE — 87505 NFCT AGENT DETECTION GI: CPT | Performed by: INTERNAL MEDICINE

## 2024-02-16 PROCEDURE — 99291 CRITICAL CARE FIRST HOUR: CPT

## 2024-02-16 PROCEDURE — 83605 ASSAY OF LACTIC ACID: CPT | Performed by: EMERGENCY MEDICINE

## 2024-02-16 PROCEDURE — 81001 URINALYSIS AUTO W/SCOPE: CPT | Performed by: EMERGENCY MEDICINE

## 2024-02-16 PROCEDURE — 02HV33Z INSERTION OF INFUSION DEVICE INTO SUPERIOR VENA CAVA, PERCUTANEOUS APPROACH: ICD-10-PCS | Performed by: INTERNAL MEDICINE

## 2024-02-16 PROCEDURE — 84439 ASSAY OF FREE THYROXINE: CPT | Performed by: NURSE PRACTITIONER

## 2024-02-16 PROCEDURE — 85025 COMPLETE CBC W/AUTO DIFF WBC: CPT | Performed by: EMERGENCY MEDICINE

## 2024-02-16 PROCEDURE — 87449 NOS EACH ORGANISM AG IA: CPT | Performed by: NURSE PRACTITIONER

## 2024-02-16 PROCEDURE — 99223 1ST HOSP IP/OBS HIGH 75: CPT | Performed by: INTERNAL MEDICINE

## 2024-02-16 PROCEDURE — 83605 ASSAY OF LACTIC ACID: CPT | Performed by: NURSE PRACTITIONER

## 2024-02-16 PROCEDURE — 25810000003 SODIUM CHLORIDE 0.9 % SOLUTION: Performed by: EMERGENCY MEDICINE

## 2024-02-16 PROCEDURE — 84443 ASSAY THYROID STIM HORMONE: CPT | Performed by: NURSE PRACTITIONER

## 2024-02-16 PROCEDURE — 94799 UNLISTED PULMONARY SVC/PX: CPT

## 2024-02-16 PROCEDURE — 87186 SC STD MICRODIL/AGAR DIL: CPT | Performed by: EMERGENCY MEDICINE

## 2024-02-16 PROCEDURE — 87641 MR-STAPH DNA AMP PROBE: CPT | Performed by: NURSE PRACTITIONER

## 2024-02-16 PROCEDURE — 36556 INSERT NON-TUNNEL CV CATH: CPT | Performed by: INTERNAL MEDICINE

## 2024-02-16 PROCEDURE — 83880 ASSAY OF NATRIURETIC PEPTIDE: CPT | Performed by: NURSE PRACTITIONER

## 2024-02-16 PROCEDURE — 87070 CULTURE OTHR SPECIMN AEROBIC: CPT | Performed by: EMERGENCY MEDICINE

## 2024-02-16 PROCEDURE — B24BZZ4 ULTRASONOGRAPHY OF HEART WITH AORTA, TRANSESOPHAGEAL: ICD-10-PCS | Performed by: INTERNAL MEDICINE

## 2024-02-16 PROCEDURE — 87205 SMEAR GRAM STAIN: CPT | Performed by: EMERGENCY MEDICINE

## 2024-02-16 PROCEDURE — 93306 TTE W/DOPPLER COMPLETE: CPT

## 2024-02-16 PROCEDURE — 86140 C-REACTIVE PROTEIN: CPT | Performed by: NURSE PRACTITIONER

## 2024-02-16 PROCEDURE — 25010000002 SULFUR HEXAFLUORIDE MICROSPH 60.7-25 MG RECONSTITUTED SUSPENSION: Performed by: INTERNAL MEDICINE

## 2024-02-16 PROCEDURE — 76937 US GUIDE VASCULAR ACCESS: CPT | Performed by: INTERNAL MEDICINE

## 2024-02-16 PROCEDURE — 74176 CT ABD & PELVIS W/O CONTRAST: CPT

## 2024-02-16 PROCEDURE — 0202U NFCT DS 22 TRGT SARS-COV-2: CPT | Performed by: NURSE PRACTITIONER

## 2024-02-16 PROCEDURE — 25010000002 PIPERACILLIN SOD-TAZOBACTAM PER 1 G: Performed by: NURSE PRACTITIONER

## 2024-02-16 PROCEDURE — 87154 CUL TYP ID BLD PTHGN 6+ TRGT: CPT | Performed by: EMERGENCY MEDICINE

## 2024-02-16 PROCEDURE — 80053 COMPREHEN METABOLIC PANEL: CPT | Performed by: EMERGENCY MEDICINE

## 2024-02-16 PROCEDURE — 89051 BODY FLUID CELL COUNT: CPT | Performed by: EMERGENCY MEDICINE

## 2024-02-16 PROCEDURE — 84145 PROCALCITONIN (PCT): CPT | Performed by: NURSE PRACTITIONER

## 2024-02-16 PROCEDURE — 93306 TTE W/DOPPLER COMPLETE: CPT | Performed by: INTERNAL MEDICINE

## 2024-02-16 PROCEDURE — 25010000002 HEPARIN (PORCINE) PER 1000 UNITS: Performed by: INTERNAL MEDICINE

## 2024-02-16 PROCEDURE — 87637 SARSCOV2&INF A&B&RSV AMP PRB: CPT | Performed by: EMERGENCY MEDICINE

## 2024-02-16 RX ORDER — BISACODYL 10 MG
10 SUPPOSITORY, RECTAL RECTAL DAILY PRN
Status: DISCONTINUED | OUTPATIENT
Start: 2024-02-16 | End: 2024-02-17

## 2024-02-16 RX ORDER — NOREPINEPHRINE BITARTRATE 0.03 MG/ML
.02-.3 INJECTION, SOLUTION INTRAVENOUS
Status: DISCONTINUED | OUTPATIENT
Start: 2024-02-16 | End: 2024-02-16

## 2024-02-16 RX ORDER — SEVELAMER CARBONATE 800 MG/1
2 TABLET, FILM COATED ORAL 2 TIMES DAILY WITH MEALS
Status: ON HOLD | COMMUNITY
Start: 2023-12-07

## 2024-02-16 RX ORDER — ACETAMINOPHEN 500 MG
1000 TABLET ORAL ONCE
Status: COMPLETED | OUTPATIENT
Start: 2024-02-16 | End: 2024-02-16

## 2024-02-16 RX ORDER — HYDROXYZINE HYDROCHLORIDE 25 MG/1
25 TABLET, FILM COATED ORAL ONCE
Status: COMPLETED | OUTPATIENT
Start: 2024-02-16 | End: 2024-02-16

## 2024-02-16 RX ORDER — CARVEDILOL 12.5 MG/1
25 TABLET ORAL 2 TIMES DAILY
Status: ON HOLD | COMMUNITY
End: 2024-02-24 | Stop reason: SDUPTHER

## 2024-02-16 RX ORDER — ACETAMINOPHEN 160 MG/5ML
650 SOLUTION ORAL EVERY 4 HOURS PRN
Status: DISCONTINUED | OUTPATIENT
Start: 2024-02-16 | End: 2024-02-24 | Stop reason: HOSPADM

## 2024-02-16 RX ORDER — SODIUM CHLORIDE 9 MG/ML
40 INJECTION, SOLUTION INTRAVENOUS AS NEEDED
Status: DISCONTINUED | OUTPATIENT
Start: 2024-02-16 | End: 2024-02-24 | Stop reason: HOSPADM

## 2024-02-16 RX ORDER — ACETAMINOPHEN 325 MG/1
650 TABLET ORAL EVERY 4 HOURS PRN
Status: DISCONTINUED | OUTPATIENT
Start: 2024-02-16 | End: 2024-02-24 | Stop reason: HOSPADM

## 2024-02-16 RX ORDER — VANCOMYCIN/0.9 % SOD CHLORIDE 1.5G/250ML
20 PLASTIC BAG, INJECTION (ML) INTRAVENOUS ONCE
Status: COMPLETED | OUTPATIENT
Start: 2024-02-16 | End: 2024-02-16

## 2024-02-16 RX ORDER — SODIUM CHLORIDE 0.9 % (FLUSH) 0.9 %
10 SYRINGE (ML) INJECTION AS NEEDED
Status: DISCONTINUED | OUTPATIENT
Start: 2024-02-16 | End: 2024-02-24 | Stop reason: HOSPADM

## 2024-02-16 RX ORDER — CHOLECALCIFEROL (VITAMIN D3) 125 MCG
5 CAPSULE ORAL NIGHTLY PRN
Status: DISCONTINUED | OUTPATIENT
Start: 2024-02-16 | End: 2024-02-24 | Stop reason: HOSPADM

## 2024-02-16 RX ORDER — ONDANSETRON 2 MG/ML
4 INJECTION INTRAMUSCULAR; INTRAVENOUS EVERY 6 HOURS PRN
Status: DISCONTINUED | OUTPATIENT
Start: 2024-02-16 | End: 2024-02-24 | Stop reason: HOSPADM

## 2024-02-16 RX ORDER — TORSEMIDE 20 MG/1
2 TABLET ORAL EVERY MORNING
Status: ON HOLD | COMMUNITY
Start: 2024-02-12 | End: 2024-02-24 | Stop reason: SDUPTHER

## 2024-02-16 RX ORDER — SODIUM CHLORIDE, SODIUM LACTATE, CALCIUM CHLORIDE, MAGNESIUM CHLORIDE AND DEXTROSE 1.5; 538; 448; 18.3; 5.08 G/100ML; MG/100ML; MG/100ML; MG/100ML; MG/100ML
2000 INJECTION, SOLUTION INTRAPERITONEAL
Status: DISCONTINUED | OUTPATIENT
Start: 2024-02-16 | End: 2024-02-16

## 2024-02-16 RX ORDER — GINSENG 100 MG
1 CAPSULE ORAL EVERY 12 HOURS SCHEDULED
Status: COMPLETED | OUTPATIENT
Start: 2024-02-16 | End: 2024-02-19

## 2024-02-16 RX ORDER — POLYETHYLENE GLYCOL 3350 17 G/17G
17 POWDER, FOR SOLUTION ORAL DAILY PRN
Status: DISCONTINUED | OUTPATIENT
Start: 2024-02-16 | End: 2024-02-17

## 2024-02-16 RX ORDER — ACETAMINOPHEN 650 MG/1
650 SUPPOSITORY RECTAL EVERY 4 HOURS PRN
Status: DISCONTINUED | OUTPATIENT
Start: 2024-02-16 | End: 2024-02-24 | Stop reason: HOSPADM

## 2024-02-16 RX ORDER — SODIUM CHLORIDE, SODIUM LACTATE, CALCIUM CHLORIDE, MAGNESIUM CHLORIDE AND DEXTROSE 1.5; 538; 448; 18.3; 5.08 G/100ML; MG/100ML; MG/100ML; MG/100ML; MG/100ML
1500 INJECTION, SOLUTION INTRAPERITONEAL
Status: DISCONTINUED | OUTPATIENT
Start: 2024-02-16 | End: 2024-02-24 | Stop reason: HOSPADM

## 2024-02-16 RX ORDER — SODIUM CHLORIDE 0.9 % (FLUSH) 0.9 %
10 SYRINGE (ML) INJECTION EVERY 12 HOURS SCHEDULED
Status: DISCONTINUED | OUTPATIENT
Start: 2024-02-16 | End: 2024-02-24 | Stop reason: HOSPADM

## 2024-02-16 RX ORDER — POTASSIUM CHLORIDE 750 MG/1
40 CAPSULE, EXTENDED RELEASE ORAL ONCE
Status: COMPLETED | OUTPATIENT
Start: 2024-02-16 | End: 2024-02-16

## 2024-02-16 RX ORDER — NOREPINEPHRINE BITARTRATE 0.03 MG/ML
.02-.3 INJECTION, SOLUTION INTRAVENOUS
Status: DISCONTINUED | OUTPATIENT
Start: 2024-02-16 | End: 2024-02-18

## 2024-02-16 RX ORDER — AMOXICILLIN 250 MG
2 CAPSULE ORAL 2 TIMES DAILY PRN
Status: DISCONTINUED | OUTPATIENT
Start: 2024-02-16 | End: 2024-02-17

## 2024-02-16 RX ORDER — HEPARIN SODIUM 5000 [USP'U]/ML
5000 INJECTION, SOLUTION INTRAVENOUS; SUBCUTANEOUS EVERY 12 HOURS SCHEDULED
Status: DISCONTINUED | OUTPATIENT
Start: 2024-02-16 | End: 2024-02-24 | Stop reason: HOSPADM

## 2024-02-16 RX ORDER — BISACODYL 5 MG/1
5 TABLET, DELAYED RELEASE ORAL DAILY PRN
Status: DISCONTINUED | OUTPATIENT
Start: 2024-02-16 | End: 2024-02-17

## 2024-02-16 RX ORDER — BISMUTH SUBSALICYLATE 262 MG/1
524 TABLET, CHEWABLE ORAL ONCE
Status: COMPLETED | OUTPATIENT
Start: 2024-02-16 | End: 2024-02-16

## 2024-02-16 RX ADMIN — ACETAMINOPHEN 650 MG: 325 TABLET ORAL at 20:27

## 2024-02-16 RX ADMIN — SODIUM CHLORIDE, SODIUM LACTATE, CALCIUM CHLORIDE, MAGNESIUM CHLORIDE AND DEXTROSE 2000 ML: 1.5; 538; 448; 18.3; 5.08 INJECTION, SOLUTION INTRAPERITONEAL at 17:57

## 2024-02-16 RX ADMIN — HYDROXYZINE HYDROCHLORIDE 25 MG: 25 TABLET, FILM COATED ORAL at 22:12

## 2024-02-16 RX ADMIN — SODIUM CHLORIDE 500 ML: 9 INJECTION, SOLUTION INTRAVENOUS at 09:35

## 2024-02-16 RX ADMIN — PIPERACILLIN AND TAZOBACTAM 4.5 G: 4; .5 INJECTION, POWDER, FOR SOLUTION INTRAVENOUS at 20:27

## 2024-02-16 RX ADMIN — HEPARIN SODIUM 5000 UNITS: 5000 INJECTION INTRAVENOUS; SUBCUTANEOUS at 20:28

## 2024-02-16 RX ADMIN — BACITRACIN 0.9 G: 500 OINTMENT TOPICAL at 22:12

## 2024-02-16 RX ADMIN — BISMUTH SUBSALICYLATE 524 MG: 262 TABLET, CHEWABLE ORAL at 22:12

## 2024-02-16 RX ADMIN — NOREPINEPHRINE BITARTRATE 0.02 MCG/KG/MIN: 0.03 INJECTION, SOLUTION INTRAVENOUS at 12:10

## 2024-02-16 RX ADMIN — POTASSIUM CHLORIDE 40 MEQ: 10 CAPSULE, COATED, EXTENDED RELEASE ORAL at 17:57

## 2024-02-16 RX ADMIN — CEFEPIME HYDROCHLORIDE 1 G: 1 INJECTION, POWDER, FOR SOLUTION INTRAMUSCULAR; INTRAVENOUS at 09:52

## 2024-02-16 RX ADMIN — SULFUR HEXAFLUORIDE 2 ML: KIT at 15:02

## 2024-02-16 RX ADMIN — SODIUM CHLORIDE 1000 ML: 9 INJECTION, SOLUTION INTRAVENOUS at 10:29

## 2024-02-16 RX ADMIN — SODIUM CHLORIDE 1500 MG: 9 INJECTION, SOLUTION INTRAVENOUS at 10:34

## 2024-02-16 RX ADMIN — ACETAMINOPHEN 1000 MG: 500 TABLET ORAL at 08:35

## 2024-02-16 RX ADMIN — HEPARIN SODIUM 5000 UNITS: 5000 INJECTION INTRAVENOUS; SUBCUTANEOUS at 12:13

## 2024-02-16 RX ADMIN — PIPERACILLIN AND TAZOBACTAM 4.5 G: 4; .5 INJECTION, POWDER, FOR SOLUTION INTRAVENOUS at 15:10

## 2024-02-16 NOTE — PLAN OF CARE
Goal Outcome Evaluation:  Plan of Care Reviewed With: patient, daughter        Progress: improving  Outcome Evaluation: Patient placed on bipap 12/6, 35% Fio2 to improve fluid overload. Patient tolerating well.

## 2024-02-16 NOTE — PROCEDURES
Linear probe ultrasound was used to evaluate and visualize right internal jugular vein. Right internal jugular vein was patent and next to right internal carotid artery.   Vascular needle entry was visualized in real time with ultrasound and guide wire insertion was done.     Ultrasound image of vascular entry of needle and guide wire is recorded online.    CPT code 56932.

## 2024-02-16 NOTE — PROCEDURES
Procedure Note    Central Line Placement Procedure Note    Indication: Shock, resp failure, need of IV access    Consent obtained: Yes.    Time Out: performed at bedside    Procedure: The patient was positioned appropriately and the skin over the right internal jugular vein was prepped with ChloraPrep and draped in sterile fashion.  Local anesthesia over the insertion site was applied with 1% lidocaine.  A large bore needle was then advanced with ultrasound guidance until there was return of dark blood. Guidewire was inserted and site confirmed with ultrasound again. Site was dilated with dilator, and triple lumen catheter was then inserted into the vessel over the guidewire using the Seldinger technique.  All three ports showed good, free flowing blood return and easily flushed with saline.  The catheter was then securely fastened to the skin with sutures and covered with a sterile dressing.  A post-procedure x-ray is pending at this time.    The patient tolerated the procedure well.    Complications: None.

## 2024-02-16 NOTE — PROGRESS NOTES
RT EQUIPMENT DEVICE RELATED - SKIN ASSESSMENT    RT Medical Equipment/Device:     NIV Mask:  Under-the-nose   size:  b    Skin Assessment:      Nose:  Intact    Device Skin Pressure Protection:  Pressure points protected    Nurse Notification:  Kate Wray RRT

## 2024-02-16 NOTE — CONSULTS
Patient Care Team:  Janna Mo MD as PCP - General (Internal Medicine)    Chief complaint: ESRD    Subjective     History of Present Illness  78yo with h/o ESRD on PD with Dr. Castro following.  He had been doing dialysis as normal.  Was having fevers/chills at home and hypotension and brought to ER.  Had been given IVF's with some increased dyspnea now.  PD exchanges have been clear, maybe some fibrin in most recent exchange.  No difficulty with exchanges.  No abd pain    Review of Systems   Constitutional:  Positive for chills, fatigue and fever.   Respiratory:  Positive for shortness of breath. Negative for cough.    Cardiovascular:  Negative for chest pain and leg swelling.   Gastrointestinal:  Negative for abdominal pain, diarrhea, nausea and vomiting.   Musculoskeletal:  Negative for back pain and neck pain.   Neurological:  Positive for weakness.   Hematological:  Negative for adenopathy.        Past Medical History:   Diagnosis Date    Anemia     Ankle pain, right     CKD (chronic kidney disease), stage IV     Diabetes     Gout     High cholesterol     HL (hearing loss)     Hyperkalemia     Hypertension     Hypothyroidism     Psoriasis     Vitiligo    ,   Past Surgical History:   Procedure Laterality Date    ANKLE SURGERY  11/1990    APPENDECTOMY N/A 1954    COLONOSCOPY N/A 06/2022    [facility]    HIP BIPOLAR REPLACEMENT Right 01/2000    INSERTION PERITONEAL DIALYSIS CATHETER N/A 3/27/2023    Procedure: LAPAROSCOPIC INSERTION PERITONEAL DIALYSIS CATHETER, LAPAROSCOPIC OMENTOPEXY WITH LYSIS OF ADHESIONS;  Surgeon: Jose Berry MD;  Location: [facility];  Service: General;  Laterality: N/A;    INSERTION PERITONEAL DIALYSIS CATHETER Left 7/23/2023    Procedure: REVISION OF PERITONEAL DIALYSIS CATHETER;  Surgeon: Radha Oreilly MD;  Location: [facility];  Service: General;  Laterality: Left;    RENAL BIOPSY Left 07/15/2022   ,   Family History   Problem Relation Age of  Onset    Diabetes Mother     Heart disease Father     Cancer Sister 35    Diabetes Sister     Diabetes Brother     Heart disease Paternal Uncle     Malig Hyperthermia Neg Hx    ,   Social History     Socioeconomic History    Marital status:    Tobacco Use    Smoking status: Former     Packs/day: 1.00     Years: 10.00     Additional pack years: 0.00     Total pack years: 10.00     Types: Cigarettes     Start date:      Quit date:      Years since quittin.1     Passive exposure: Past    Smokeless tobacco: Never    Tobacco comments:     quit 25 years ago, chews nicotine gum   Vaping Use    Vaping Use: Never used   Substance and Sexual Activity    Alcohol use: Yes     Comment: 1 DRINK/DAY    Drug use: Never    Sexual activity: Defer     E-cigarette/Vaping    E-cigarette/Vaping Use Never User      E-cigarette/Vaping Substances    Nicotine No     THC No     CBD No     Flavoring No      E-cigarette/Vaping Devices    Disposable No     Pre-filled or Refillable Cartridge No     Refillable Tank No     Pre-filled Pod No          , (Not in a hospital admission) , Scheduled Meds:  [START ON 2024] cefepime, 1,000 mg, Intravenous, Q24H  heparin (porcine), 5,000 Units, Subcutaneous, Q12H  sodium chloride, 10 mL, Intravenous, Q12H   , Continuous Infusions:  norepinephrine, 0.02-0.3 mcg/kg/min, Last Rate: 0.1 mcg/kg/min (24 1232)  Pharmacy to Dose Cefepime,   Pharmacy to dose vancomycin,    , PRN Meds:    acetaminophen **OR** acetaminophen **OR** acetaminophen    senna-docusate sodium **AND** polyethylene glycol **AND** bisacodyl **AND** bisacodyl    melatonin    ondansetron    Pharmacy to Dose Cefepime    Pharmacy to dose vancomycin    sodium chloride    sodium chloride, and Allergies:  Patient has no known allergies.    Objective     Vital Signs  Temp:  [100.7 °F (38.2 °C)-101.4 °F (38.6 °C)] 100.7 °F (38.2 °C)  Heart Rate:  [78-93] 87  Resp:  [16] 16  BP: ()/(18-69) 87/55    I/O this shift:  In:  "1600 [IV Piggyback:1600]  Out: -   No intake/output data recorded.    Physical Exam  Constitutional:       Appearance: He is ill-appearing.   HENT:      Nose: Nose normal.      Mouth/Throat:      Mouth: Mucous membranes are moist.   Eyes:      General: No scleral icterus.     Pupils: Pupils are equal, round, and reactive to light.   Cardiovascular:      Rate and Rhythm: Regular rhythm. Tachycardia present.      Pulses: Normal pulses.   Pulmonary:      Effort: Respiratory distress present.      Breath sounds: Rhonchi present.   Abdominal:      General: Abdomen is flat. There is no distension.      Palpations: Abdomen is soft.      Tenderness: There is no abdominal tenderness. There is no guarding.   Musculoskeletal:         General: Normal range of motion.      Cervical back: Normal range of motion.      Right lower leg: No edema.      Left lower leg: No edema.   Neurological:      General: No focal deficit present.      Mental Status: He is alert.         Results Review:    I reviewed the patient's new clinical results.  I reviewed the patient's other test results and agree with the interpretation    WBC WBC   Date Value Ref Range Status   02/16/2024 5.34 3.40 - 10.80 10*3/mm3 Final      HGB Hemoglobin   Date Value Ref Range Status   02/16/2024 10.9 (L) 13.0 - 17.7 g/dL Final      HCT Hematocrit   Date Value Ref Range Status   02/16/2024 34.2 (L) 37.5 - 51.0 % Final      Platlets No results found for: \"LABPLAT\"   MCV MCV   Date Value Ref Range Status   02/16/2024 94.7 79.0 - 97.0 fL Final          Sodium Sodium   Date Value Ref Range Status   02/16/2024 135 (L) 136 - 145 mmol/L Final      Potassium Potassium   Date Value Ref Range Status   02/16/2024 3.1 (L) 3.5 - 5.2 mmol/L Final      Chloride Chloride   Date Value Ref Range Status   02/16/2024 95 (L) 98 - 107 mmol/L Final      CO2 CO2   Date Value Ref Range Status   02/16/2024 24.2 22.0 - 29.0 mmol/L Final      BUN BUN   Date Value Ref Range Status   02/16/2024 38 " "(H) 8 - 23 mg/dL Final      Creatinine Creatinine   Date Value Ref Range Status   02/16/2024 7.22 (H) 0.76 - 1.27 mg/dL Final      Calcium Calcium   Date Value Ref Range Status   02/16/2024 8.3 (L) 8.6 - 10.5 mg/dL Final      PO4 No results found for: \"CAPO4\"   Albumin Albumin   Date Value Ref Range Status   02/16/2024 3.0 (L) 3.5 - 5.2 g/dL Final      Magnesium No results found for: \"MG\"   Uric Acid No results found for: \"URICACID\"         Assessment & Plan       Peritonitis      Assessment & Plan  ESRD-  On PD at home.  Will restart manual exchanges 2L 1.5%, 4 times daily with overnight dwell.  Fluid reported clear and no abd pain so less likely peritonitis.  PD fluid cx, gram stain, and cell count submitted.    Hypotension/sepsis-  blood cx submitted.  Elevated lactate 3.4.  UA with only trace bacteria.  On IV abx now.  If evidence of peritonitis will change to IP.  CT with evidence of RLL PNA.  Dyspnea-  flu/covid/rsv negative.  Pulm following.  Anemia of CKD-  at goal 10.9.  Hypokalemia-  replace po.  Remove potassium restriction from diet order.  PNA-  RLL seen on CT/ repeat cxr.    I discussed the patients findings and my recommendations with patient, family, and nursing staff    Rolf Cha MD  02/16/24  12:56 EST            "

## 2024-02-16 NOTE — CONSULTS
Pulmonary / Critical Care Consult Note      Patient Name: Nelson Wang  : 1945  MRN: [id number]  Primary Care Physician:  Janna Mo MD  Referring Physician: Fredrick Mclaughlin DO  Date of admission: 2024    Subjective   Subjective     Reason for Consult/ Chief Complaint: FEVER/hypotension    HPI:  Nelson Wang is a 79 y.o. male with history of ESRD on hemodialysis, type 2 diabetes, hypertension, hypothyroidism presented to the ED with complaints of weakness and fever.  Patient reports he has had upper respiratory congestion and cough over the past 3 days, has progressively become weak, and ongoing fever proceeded to the ED.  On arrival patient was febrile with temperature 101, on room air with adequate O2 saturation, blood pressure was in the 60s-70s systolic.  Laboratory findings showed elevated lactic acid 3.4, WBC 5.3, hemoglobin 10.9, creatinine 7.2, sodium 135, potassium 3.1, urinalysis was benign.  Chest x-ray was negative for acute abnormalities.  He was given IV fluid resuscitation, however developed increased shortness of breath, crackles and decreased O2 saturation initially was placed on NIPPV however cannot tolerate was then transition to nonrebreather mask.  Patient was given IV antibiotics and initiated on Levophed for hypotension unresponsive to fluid resuscitation.  Our service was consulted for critical care management of septic shock.  On exam patient is in mild respiratory distress, he is on nonrebreather mask, he tells me he is claustrophobic unable to wear nasal cannula/Airvo or lie flat at this time.  O2 saturation 99%.  Patient was placed on 50% Venturi mask, O2 saturation 92%.  He denies any abdominal pain, cannot lie flat for CT scan at this time.    Review of Systems  Constitutional symptoms:  Denied complaints   Cardiovascular:  Denied complaints  Respiratory: + Sputum production,+ shortness of air, denies hemoptysis, denies wheezing  Gastrointestinal: Denied  complaints  Musculoskeletal: Denied complaints  Genitourinary: Denied complaints  Neurologic: Denied complaints      Personal History     Past Medical History:   Diagnosis Date    Anemia     Ankle pain, right     CKD (chronic kidney disease), stage IV     Diabetes     Gout     High cholesterol     HL (hearing loss)     Hyperkalemia     Hypertension     Hypothyroidism     Psoriasis     Vitiligo        Past Surgical History:   Procedure Laterality Date    ANKLE SURGERY  11/1990    APPENDECTOMY N/A 1954    [facility] N[facility]A [facility]/2022    [facility]    HIP BIPOLAR REPLACEMENT Right 01/2000    INSERTION PERITONEAL DIALYSIS CATHETER N/A 3/27/2023    Procedure: LAPAROSCOPIC INSERTION PERITONEAL DIALYSIS CATHETER, LAPAROSCOPIC OMENTOPEXY WITH LYSIS OF ADHESIONS;  Surgeon: Jose Berry MD;  Location: [facility];  Service: General;  Laterality: N/A;    INSERTION PERITONEAL DIALYSIS CATHETER Left 7/23/2023    Procedure: REVISION OF PERITONEAL DIALYSIS CATHETER;  Surgeon: Radha Oreilly MD;  Location: [facility];  Service: General;  Laterality: Left;    RENAL BIOPSY Left 07/15/2022       Family History: family history includes Cancer (age of onset: 35) in his sister; Diabetes in his brother, mother, and sister; Heart disease in his father and paternal uncle. Otherwise pertinent FHx was reviewed and not pertinent to current issue.    Social History:  reports that he quit smoking about 24 years ago. His smoking use included cigarettes. He started smoking about 34 years ago. He has a 10.00 pack-year smoking history. He has been exposed to tobacco smoke. He has never used smokeless tobacco. He reports current alcohol use. He reports that he does not use drugs.    Home Medications:  Insulin Glargine, Risankizumab-rzaa, Turmeric, allopurinol, ascorbic acid, atorvastatin, carvedilol, fish oil, levothyroxine, sertraline, sevelamer, and torsemide    Allergies:  No Known Allergies    Objective    Objective      Vitals:   Temp:  [100.7 °F (38.2 °C)-101.4 °F (38.6 °C)] 100.7 °F (38.2 °C)  Heart Rate:  [78-93] 87  Resp:  [16] 16  BP: ()/(18-69) 87/55    Physical Exam:  Vital Signs Reviewed   General: WDWN, Alert, NAD.  On Venturi mask  HEENT:  PERRL, EOMI.  OP, nares clear  Neck:  Supple, no JVD, no thyromegaly  Chest:  good aeration, coarse rhonchi to auscultation bilaterally, tympanic to percussion bilaterally, no work of breathing noted  CV: RRR, no MGR, pulses 2+, equal.  Abd:  Soft, NT, ND, + BS, no HSM  EXT:  no clubbing, no cyanosis, no edema  Neuro:  A&Ox3, CN grossly intact, no focal deficits.  Skin: No rashes or lesions noted      Result Review    Result Review:  I have personally reviewed the results from the time of this admission to 2/16/2024 12:36 EST and agree with these findings:  [x]  Laboratory  [x]  Microbiology  [x]  Radiology  []  EKG/Telemetry   []  Cardiology/Vascular   []  Pathology  [x]  Old records  []  Other:  Most notable findings include:       Lab 02/16/24  0746   WBC 5.34   HEMOGLOBIN 10.9*   HEMATOCRIT 34.2*   PLATELETS 102*   SODIUM 135*   POTASSIUM 3.1*   CHLORIDE 95*   CO2 24.2   BUN 38*   CREATININE 7.22*   GLUCOSE 96   CALCIUM 8.3*   TOTAL PROTEIN 5.7*   ALBUMIN 3.0*   GLOBULIN 2.7         Assessment & Plan   Assessment / Plan     Active Hospital Problems:  Active Hospital Problems    Diagnosis     **Peritonitis      Impression:  Shock, present on admission  Concern for peritonitis  Clinically significant lactic acidosis  ESRD on peritoneal dialysis  Anemia of CKD  Hypokalemia  Anion gap metabolic acidosis    Plan:  -Patient received IV fluid resuscitation per sepsis guidelines  -Would avoid further resuscitation due to hypoxia/ESRD status  -Trend lactic acid until clear  -Continue supplemental oxygen okay to use Venturi mask, Airvo or high flow nasal cannula  -Wean FiO2 for SpO2 greater than 90  -Will repeat chest x-ray stat since change in respiratory status and increased O2  demand  -Check proBNP, send respiratory culture, check respiratory viral panel  -Send urine antigens, Pro-Niels, MRSA PCR  -Continue broad-spectrum antibiotics vancomycin and zosyn IV.   -PD fluid in process, no abdominal pain at this time  -Blood culture in process  -Check CT scan of abdomen  -Check echocardiogram  -Check thyroid studies  -Hold antihypertensives  -Continue Levophed to maintain MAP at 65  -Nephrology services consulted, PD/HD per their service  -Evaluate nutritional needs once clinically stable    DVT prophylaxis:  Medical and mechanical DVT prophylaxis orders are present.         There are no questions and answers to display.      I, BECKY Santana, spent 15 minutes critical care time.    Electronically signed by BECKY Albert, 02/16/24, 12:36 PM EST.      The patient is critically ill in the ICU with shock, peritonitis, lactic acidosis, ESRD on peritoneal dialysis, anemia, hypokalemia, anion gap metabolic acidosis. Multidisciplinary bedside critical care rounds were performed with nursing staff, respiratory therapy, pharmacy, nutritional services, social work. I have personally reviewed the chart, labs and any pertinent imaging available.  We have spent 35 minutes of critical care time, excluding procedures, in the care of this patient. I, Dr Jimenez, spent 20 mins of critical care time according to split shared critical care billing guidelines.     Electronically signed by Preston Jimenez MD, 02/16/24, 3:04 PM EST.

## 2024-02-16 NOTE — PROGRESS NOTES
"[facility] Clinical Pharmacy Services: Vancomycin Pharmacokinetic Initial Consult Note    Nelson Wang is a 79 y.o. male who is on day 1 of pharmacy to dose vancomycin for  empiric coverage .    Consult Information  Consulting Provider: Jeremías  Planned Duration of Therapy: 3 days (through ) or TBD  Was Patient Receiving Prior to Admission/Consult?: No  Loading Dose Ordered or Given: 1500 mg on  at 1034  PK/PD Target: Dose by Levels  Other Antimicrobials: Cefepime    Imaging Reviewed?: Yes    Microbiology Data  MRSA PCR performed: No; Result: Not ordered due to excluded indication or presence of suspected abscess  Culture/Source:    Body Fluid cx, peritoneum: No organisms   COVID-19, flu, RSV: not detected   Blood cx: in process    Vitals/Labs  Ht: 167.6 cm (66\"); Wt: 76.2 kg (167 lb 15.9 oz)  Temp (24hrs), Av °F (38.3 °C), Min:100.7 °F (38.2 °C), Max:101.4 °F (38.6 °C)   Estimated Creatinine Clearance: 9.1 mL/min (A) (by C-G formula based on SCr of 7.22 mg/dL (H)).  Peritoneal Dialysis     Results from last 7 days   Lab Units 24  0746   CREATININE mg/dL 7.22*   WBC 10*3/mm3 5.34     Assessment/Plan:    Vancomycin Dose: Pulse dosing; pt received loading dose @1034.  Will order random level with AM labs for assessment of further dosing opportunities.    Vanc Random ordered for  at AM labs  Patient has order for Basic Metabolic Panel    Pharmacy will follow patient's kidney function and will adjust doses and obtain levels as necessary. Thank you for involving pharmacy in this patient's care. Please contact pharmacy with any questions or concerns.                           Avril Adan [facility]  Clinical Pharmacist    "

## 2024-02-16 NOTE — H&P
[facility]   HOSPITALIST HISTORY AND PHYSICAL  Date: 2024   Patient Name: Nelson Wang  : 1945  MRN: [id number]  Primary Care Physician:  Janna Mo MD  Date of admission: 2024    Subjective   Subjective     Chief Complaint: I had a fever and was weak    HPI:    Nelson Wang is a 79 y.o. male with past medical history significant for stage renal disease on peritoneal dialysis, hypertension, diabetes mellitus type 2 hypertension as well as psoriasis reports that on yesterday he began having generalized weakness as well as body aches and fever up to 104.  Despite Tylenol reports having recurrent fevers and subsequently presented to the emergency department on this morning for further evaluation.  Patient does report having a nonproductive cough he denies any shortness of breath or chest pain.  Denies any abdominal pain no nausea or vomiting.  He does report having had episodes of diarrhea.     In the emergency department patient presentation temperature is noted to be 101, pulse 93, respirations 16 with blood pressure 128/69 pulse ox 94% on room air.  Repeat blood pressure while in the ED did drop to as low as 68/51.  CBC showed WBC of 5.3 hemoglobin Mattock are 10.9 and 34.2 respectively, platelets 102 neutrophils of 77%.  CMP showed BUN of 38 creatinine 7.22, sodium 135, potassium 3.1, chloride 95, calcium 8.3, total protein 5.7, albumin of 3.0, anion gap of 15.8.  Serum lactate is 3.4 with a repeat of 3.4.  Fluvid/RSV is negative, initial chest x-ray shows no acute process.   Repeat chest x-ray shows right lower lobe airspace opacity.  In the emergency department the patient received IV fluid bolus via sepsis protocol.   Despite this he did require initiation of Levophed however.  He also received Zosyn and vancomycin.  Hospitalist service was consulted for admission for patient with sepsis.    Personal History     Past Medical History:  Past Medical History:   Diagnosis Date     Anemia     Ankle pain, right     CKD (chronic kidney disease), stage IV     Diabetes     Gout     High cholesterol     HL (hearing loss)     Hyperkalemia     Hypertension     Hypothyroidism     Psoriasis     Vitiligo        Past Surgical History:  Past Surgical History:   Procedure Laterality Date    ANKLE SURGERY  1990    APPENDECTOMY N/A     COLONOSCOPY N/A 2022    [facility]    HIP BIPOLAR REPLACEMENT Right 2000    INSERTION PERITONEAL DIALYSIS CATHETER N/A 3/27/2023    Procedure: LAPAROSCOPIC INSERTION PERITONEAL DIALYSIS CATHETER, LAPAROSCOPIC OMENTOPEXY WITH LYSIS OF ADHESIONS;  Surgeon: Jose Berry MD;  Location: [facility];  Service: General;  Laterality: N/A;    INSERTION PERITONEAL DIALYSIS CATHETER Left 2023    Procedure: REVISION OF PERITONEAL DIALYSIS CATHETER;  Surgeon: Radha Oreilly MD;  Location: [facility];  Service: General;  Laterality: Left;    RENAL BIOPSY Left 07/15/2022       Family History:   Family History   Problem Relation Age of Onset    Diabetes Mother     Heart disease Father     Cancer Sister 35    Diabetes Sister     Diabetes Brother     Heart disease Paternal Uncle     Malig Hyperthermia Neg Hx        Social History:   Social History     Socioeconomic History    Marital status:    Tobacco Use    Smoking status: Former     Packs/day: 1.00     Years: 10.00     Additional pack years: 0.00     Total pack years: 10.00     Types: Cigarettes     Start date:      Quit date:      Years since quittin.1     Passive exposure: Past    Smokeless tobacco: Never    Tobacco comments:     quit 25 years ago, chews nicotine gum   Vaping Use    Vaping Use: Never used   Substance and Sexual Activity    Alcohol use: Yes     Comment: 1 DRINK/DAY    Drug use: Never    Sexual activity: Defer       Home Medications:  Insulin Glargine, Risankizumab-rzaa, Torsemide, Turmeric, allopurinol, ascorbic acid, atorvastatin, fish oil, levothyroxine,  sertraline, and sevelamer    Allergies:  No Known Allergies    Review of Systems   All systems were reviewed and negative except for: As noted in the HPI  Objective   Objective     Vitals:   Temp:  [100.7 °F (38.2 °C)-101.4 °F (38.6 °C)] 100.7 °F (38.2 °C)  Heart Rate:  [78-93] 82  Resp:  [16] 16  BP: ()/(18-69) 90/53    Physical Exam    Constitutional: Awake, alert, no acute distress   Eyes: Pupils equal, sclerae anicteric, no conjunctival injection   HENT: NCAT, mucous membranes moist   Neck: Supple, no thyromegaly, no lymphadenopathy, trachea midline   Respiratory: Clear to auscultation bilaterally, nonlabored respirations    Cardiovascular: RRR, no appreciable murmurs, palpable pedal pulses bilaterally   Gastrointestinal: Positive bowel sounds, soft, nontender, nondistended   Musculoskeletal: No bilateral ankle edema, no clubbing or cyanosis to extremities   Psychiatric: Appropriate affect, cooperative   Neurologic: Oriented x 3, moving all extremities equally-no focal weakness, Cranial Nerves grossly intact to confrontation, speech clear   Skin: Hypopigmented areas of the face and extremities.    Result Review    Result Review:  I have personally reviewed the results from the time of this admission to 2/16/2024 11:50 EST and agree with these findings:  [x]  Laboratory  [x]  Microbiology  [x]  Radiology  [x]  EKG/Telemetry   []  Cardiology/Vascular   []  Pathology  [x]  Old records  []  Other:      Assessment & Plan   Assessment / Plan     Assessment/Plan:   Severe sepsis with shock (with findings of lactic acidosis and hypotension)  Possible peritonitis-awaiting peritoneal fluid results.  Right lower lobe pneumonia  Acute anion gap metabolic acidosis most likely secondary to lactic acidosis  End-stage renal disease on peritoneal dialysis  Hypokalemia      Admit to the hospitalist service to ICU for further management.  Pulmonary critical care consulted.  Case discussed with Dr. Nitesh Gonzales  broad-spectrum antibiotic with Zosyn and vancomycin.  Follow-up blood/peritoneal cultures  Continue Levophed.  Defer further IV fluids resuscitative measures given development hypoxia post initial resuscitative measures.    Supplemental O2 as needed   Nephrology consulted for dialysis recommendations.  Replace potassium      DVT prophylaxis:  Medical and mechanical DVT prophylaxis orders are present.        CODE STATUS:         Admission Status:  I believe this patient meets inpatient status.    Electronically signed by Garland Veronica MD, 02/16/24, 11:50 AM EST.

## 2024-02-16 NOTE — ED PROVIDER NOTES
Time: 7:32 AM EST  Date of encounter:  2/16/2024  Independent Historian/Clinical History and Information was obtained by:   Patient and Family    History is limited by: N/A    Chief Complaint: Fever and weakness.      History of Present Illness:  Patient is a 79 y.o. year old male who presents to the emergency department for evaluation of fever and weakness.  This patient is a peritoneal dialysis patient and states that for the last several days he has had a fever.  The patient has had increasing weakness to the point was having difficulty ambulating today.  The patient has had some mild URI symptoms and cough however no vomiting or diarrhea and no significant abdominal pain.  Of note the patient's dialysis fluid has been clear and has had no signs of infection.  The patient is had no new urinary symptoms and he continues to make urine.  He has had decreased appetite associated with his above symptoms.    HPI    Patient Care Team  Primary Care Provider: Janna Mo MD    Past Medical History:     No Known Allergies  Past Medical History:   Diagnosis Date    Anemia     Ankle pain, right     CKD (chronic kidney disease), stage IV     Diabetes     Gout     High cholesterol     HL (hearing loss)     Hyperkalemia     Hypertension     Hypothyroidism     Psoriasis     Vitiligo      Past Surgical History:   Procedure Laterality Date    ANKLE SURGERY  11/1990    APPENDECTOMY N/A 1954    COLONOSCOPY N/A 06/2022    [facility]    HIP BIPOLAR REPLACEMENT Right 01/2000    INSERTION PERITONEAL DIALYSIS CATHETER N/A 3/27/2023    Procedure: LAPAROSCOPIC INSERTION PERITONEAL DIALYSIS CATHETER, LAPAROSCOPIC OMENTOPEXY WITH LYSIS OF ADHESIONS;  Surgeon: Jose Berry MD;  Location: [facility];  Service: General;  Laterality: N/A;    INSERTION PERITONEAL DIALYSIS CATHETER Left 7/23/2023    Procedure: REVISION OF PERITONEAL DIALYSIS CATHETER;  Surgeon: Radha Oreilly MD;  Location: [facility] OR;   Service: General;  Laterality: Left;    RENAL BIOPSY Left 07/15/2022     Family History   Problem Relation Age of Onset    Diabetes Mother     Heart disease Father     Cancer Sister 35    Diabetes Sister     Diabetes Brother     Heart disease Paternal Uncle     Malig Hyperthermia Neg Hx        Home Medications:  Prior to Admission medications    Medication Sig Start Date End Date Taking? Authorizing Provider   allopurinol (ZYLOPRIM) 300 MG tablet Take 1 tablet by mouth Daily. 1/5/23   Serena Matute MD   ascorbic acid (VITAMIN C) 1000 MG tablet Take 1 tablet by mouth Daily.    Serena Matute MD   Ascorbic Acid Buffered (Buffered Vitamin C) 1000 MG capsule Take 1 capsule by mouth Daily. 4/18/23   Serena Matute MD   atorvastatin (LIPITOR) 40 MG tablet Take 1 tablet by mouth Daily. 1/8/24   Janna Mo MD   carvedilol (COREG) 12.5 MG tablet Take 1 tablet by mouth 2 (Two) Times a Day. 3/22/23   Logan Nicole MD   clobetasol (TEMOVATE) 0.05 % ointment  9/16/22   Serena Matute MD   Lantus SoloStar 100 UNIT/ML injection pen INJECT 20 UNITS SUBCUTANEOUSLY ONCE DAILY 1/12/24   Janna Mo MD   levothyroxine (SYNTHROID, LEVOTHROID) 125 MCG tablet Take 1 tablet by mouth Daily. 8/2/23   Janna Mo MD   Methoxy PEG-Epoetin Beta (MIRCERA IJ) Inject 100 mcg under the skin into the appropriate area as directed. 12/5/23   Serena Matute MD   methylPREDNISolone (MEDROL) 4 MG dose pack Take as directed on package instructions. 12/6/23   Jada Martin PA-C   Omega-3 Fatty Acids (fish oil) 1000 MG capsule capsule Take 1 capsule by mouth Daily With Breakfast.    Serena Matute MD   Risankizumab-rzaa (SKYRIZI, 150 MG DOSE, SC) Inject  under the skin into the appropriate area as directed. Once every 3 months    Serena Matute MD   sertraline (ZOLOFT) 100 MG tablet Take 1 tablet by mouth Every Night. 9/19/23   Scottie Lundberg APRN   sevelamer (RENVELA) 800  "MG tablet Take 1 tablet by mouth 3 (Three) Times a Day. 10/26/23   Serena Matute MD   Torsemide 40 MG tablet Take 40 mg by mouth Daily. 22   Serena Matute MD   Turmeric 500 MG capsule Take 1 capsule by mouth 2 (Two) Times a Day.    Serena Matute MD        Social History:   Social History     Tobacco Use    Smoking status: Former     Packs/day: 1.00     Years: 10.00     Additional pack years: 0.00     Total pack years: 10.00     Types: Cigarettes     Start date:      Quit date:      Years since quittin.1     Passive exposure: Past    Smokeless tobacco: Never    Tobacco comments:     quit 25 years ago, chews nicotine gum   Vaping Use    Vaping Use: Never used   Substance Use Topics    Alcohol use: Yes     Comment: 1 DRINK/DAY    Drug use: Never         Review of Systems:  Review of Systems   Constitutional:  Negative for chills and fever.   HENT:  Negative for congestion, ear pain and sore throat.    Eyes:  Negative for pain.   Respiratory:  Negative for cough, chest tightness and shortness of breath.    Cardiovascular:  Negative for chest pain.   Gastrointestinal:  Negative for abdominal pain, diarrhea, nausea and vomiting.   Genitourinary:  Negative for flank pain and hematuria.   Musculoskeletal:  Positive for myalgias. Negative for joint swelling.   Skin:  Negative for pallor.   Neurological:  Positive for weakness and light-headedness. Negative for seizures and headaches.   All other systems reviewed and are negative.       Physical Exam:  BP (!) 79/48   Pulse 75   Temp (!) 100.7 °F (38.2 °C) (Oral)   Resp 16   Ht 167.6 cm (66\")   Wt 76.2 kg (167 lb 15.9 oz)   SpO2 100%   BMI 27.11 kg/m²     Physical Exam  Vitals and nursing note reviewed.   Constitutional:       General: He is not in acute distress.     Appearance: Normal appearance. He is not toxic-appearing.   HENT:      Head: Normocephalic and atraumatic.      Mouth/Throat:      Mouth: Mucous membranes are " moist.   Eyes:      General: No scleral icterus.  Cardiovascular:      Rate and Rhythm: Normal rate and regular rhythm.      Pulses: Normal pulses.      Heart sounds: Normal heart sounds.   Pulmonary:      Effort: Pulmonary effort is normal. No respiratory distress.      Breath sounds: Normal breath sounds.   Abdominal:      General: Abdomen is flat.      Palpations: Abdomen is soft.      Tenderness: There is no abdominal tenderness.   Musculoskeletal:         General: Normal range of motion.      Cervical back: Normal range of motion and neck supple.   Skin:     General: Skin is warm and dry.      Capillary Refill: Capillary refill takes less than 2 seconds.   Neurological:      General: No focal deficit present.      Mental Status: He is alert and oriented to person, place, and time. Mental status is at baseline.   Psychiatric:         Mood and Affect: Mood normal.         Behavior: Behavior normal.         Thought Content: Thought content normal.         Judgment: Judgment normal.                  Procedures:  Procedures      Medical Decision Making:      Comorbidities that affect care:    End-stage renal disease on dialysis.    External Notes reviewed:    Previous Clinic Note: Nephrology clinic note on January 18      The following orders were placed and all results were independently analyzed by me:  Orders Placed This Encounter   Procedures    COVID-19, FLU A/B, RSV PCR 1 HR TAT - Swab, Nasopharynx    Blood Culture - Blood,    Blood Culture - Blood,    Body Fluid Culture - Body Fluid, Peritoneum    Gram Stain - No Culture    Respiratory Culture - Sputum, Cough    Legionella Antigen, Urine - Urine, Urine, Clean Catch    S. Pneumo Ag Urine or CSF - Urine, Urine, Clean Catch    MRSA Screen, PCR (Inpatient) - Swab, Nares    Respiratory Panel PCR w/COVID-19(SARS-CoV-2) RA/TANIA/GAVIN/PAD/COR/NENA In-House, NP Swab in UTM/VTM, 2 HR TAT - Swab, Nasopharynx    Clostridioides difficile Toxin - Stool, Per Rectum    Stool  Culture (Reference Lab) - Stool, Per Rectum    Clostridioides difficile Toxin, PCR - Stool, Per Rectum    XR Chest 1 View    CT Abdomen Pelvis Without Contrast    XR Chest 1 View    Comprehensive Metabolic Panel    Urinalysis With Culture If Indicated - Urine, Clean Catch    Lactic Acid, Plasma    Body Fluid Cell Count With Differential - Body Fluid, Peritoneum    CBC Auto Differential    Body fluid cell count - Body Fluid, Peritoneum    STAT Lactic Acid, Reflex    Body fluid differential - Body Fluid, Peritoneum    Urinalysis, Microscopic Only - Urine, Clean Catch    Basic Metabolic Panel    Magnesium    Phosphorus    STAT Lactic Acid, Reflex    Procalcitonin    BNP    Vancomycin, Random    TSH Rfx On Abnormal To Free T4    T4, Free    STAT Lactic Acid, Reflex    C-reactive Protein    Lactic Acid, Plasma    Diet: Renal Diets; Low Sodium (2-3g), Low Phosphorus; Texture: Regular Texture (IDDSI 7); Fluid Consistency: Thin (IDDSI 0)    Notify Provider (With Default Parameters)    Intake & Output    Weigh Patient    Daily Weights    Tobacco Cessation Education    Saline Lock & Maintain IV Access    Place Sequential Compression Device    Maintain Sequential Compression Device    Vital Signs (Specify Frequency)    Pulse Oximetry, Continuous    Activity - Strict Bed Rest    Weigh Patient    Intake and Output    PD Catheter Site Care    Hospitalist (on-call MD unless specified)    Inpatient Case Management  Consult    Inpatient Nephrology Consult    OT Consult: Eval & Treat ADL Performance Below Baseline    PT Consult: Eval & Treat Functional Mobility Below Baseline    NIPPV-IPPV / BIPAP / CPAP    Incentive Spirometry    Oxygen Therapy- Nasal Cannula; Titrate 1-6 LPM Per SpO2; 90 - 95%    SLP Consult: Eval & Treat Other; right lower lobe pneumonia    POC Glucose Once    Adult Transthoracic Echo Complete W/ Cont if Necessary Per Protocol    Insert Peripheral IV    Continuous Ambulatory Peritoneal Dialysis     Inpatient Admission    Fall Precautions    Aspiration Precautions    CBC & Differential    CBC & Differential       Medications Given in the Emergency Department:  Medications   sodium chloride 0.9 % flush 10 mL (10 mL Intravenous Not Given 2/16/24 1452)   sodium chloride 0.9 % flush 10 mL (has no administration in time range)   sodium chloride 0.9 % infusion 40 mL (has no administration in time range)   melatonin tablet 5 mg (has no administration in time range)   heparin (porcine) 5000 UNIT/ML injection 5,000 Units (5,000 Units Subcutaneous Given 2/16/24 1213)   acetaminophen (TYLENOL) tablet 650 mg (has no administration in time range)     Or   acetaminophen (TYLENOL) 160 MG/5ML oral solution 650 mg (has no administration in time range)     Or   acetaminophen (TYLENOL) suppository 650 mg (has no administration in time range)   sennosides-docusate (PERICOLACE) 8.6-50 MG per tablet 2 tablet (has no administration in time range)     And   polyethylene glycol (MIRALAX) packet 17 g (has no administration in time range)     And   bisacodyl (DULCOLAX) EC tablet 5 mg (has no administration in time range)     And   bisacodyl (DULCOLAX) suppository 10 mg (has no administration in time range)   ondansetron (ZOFRAN) injection 4 mg (has no administration in time range)   Pharmacy to dose vancomycin (has no administration in time range)   norepinephrine (LEVOPHED) 8 mg in 250 mL NS infusion (premix) (0.16 mcg/kg/min × 76.2 kg Intravenous Rate/Dose Change 2/16/24 1556)   dianeal lo-peter 1.5% (DIANEAL) peritoneal dialysate 2,000 mL (has no administration in time range)   Pharmacy to Dose Zosyn (has no administration in time range)   piperacillin-tazobactam (ZOSYN) 4.5 g/100 mL 0.9% NS IVPB (mbp) (has no administration in time range)   potassium chloride (MICRO-K/KLOR-CON) CR capsule (has no administration in time range)   acetaminophen (TYLENOL) tablet 1,000 mg (1,000 mg Oral Given 2/16/24 0764)   sodium chloride 0.9 % bolus 500  mL (0 mL Intravenous Stopped 2/16/24 0956)   cefepime (MAXIPIME) 1000 mg/100 mL 0.9% NS IVPB (mbp) (0 g Intravenous Stopped 2/16/24 1029)   vancomycin IVPB 1500 mg in 0.9% NaCl (Premix) 500 mL (1,500 mg Intravenous New Bag 2/16/24 1034)   sodium chloride 0.9 % bolus 1,000 mL (0 mL Intravenous Stopped 2/16/24 1057)   piperacillin-tazobactam (ZOSYN) 4.5 g/100 mL 0.9% NS IVPB (mbp) (4.5 g Intravenous New Bag 2/16/24 1510)   Sulfur Hexafluoride Microsph (LUMASON) 60.7-25 MG IV reconstituted suspension reconstituted suspension 2 mL (2 mL Injection Given 2/16/24 1502)        ED Course:         Labs:    Lab Results (last 24 hours)       Procedure Component Value Units Date/Time    CBC & Differential [[id number]]  (Abnormal) Collected: 02/16/24 0746    Specimen: Blood from Arm, Right Updated: 02/16/24 0756    Narrative:      The following orders were created for panel order CBC & Differential.  Procedure                               Abnormality         Status                     ---------                               -----------         ------                     CBC Auto Differential[[id number]]        Abnormal            Final result                 Please view results for these tests on the individual orders.    Comprehensive Metabolic Panel [[id number]]  (Abnormal) Collected: 02/16/24 0746    Specimen: Blood from Arm, Right Updated: 02/16/24 0816     Glucose 96 mg/dL      BUN 38 mg/dL      Creatinine 7.22 mg/dL      Sodium 135 mmol/L      Potassium 3.1 mmol/L      Chloride 95 mmol/L      CO2 24.2 mmol/L      Calcium 8.3 mg/dL      Total Protein 5.7 g/dL      Albumin 3.0 g/dL      ALT (SGPT) 9 U/L      AST (SGOT) 15 U/L      Alkaline Phosphatase 60 U/L      Total Bilirubin 0.6 mg/dL      Globulin 2.7 gm/dL      A/G Ratio 1.1 g/dL      BUN/Creatinine Ratio 5.3     Anion Gap 15.8 mmol/L      eGFR 7.2 mL/min/1.73      Comment: <15 Indicative of kidney failure       Narrative:      GFR Normal >60  Chronic Kidney Disease  <60  Kidney Failure <15    The GFR formula is only valid for adults with stable renal function between ages 18 and 70.    Blood Culture - Blood, Arm, Right [[id number]] Collected: 02/16/24 0746    Specimen: Blood from Arm, Right Updated: 02/16/24 0753    Lactic Acid, Plasma [[id number]]  (Abnormal) Collected: 02/16/24 0746    Specimen: Blood from Arm, Right Updated: 02/16/24 0827     Lactate 3.4 mmol/L     CBC Auto Differential [[id number]]  (Abnormal) Collected: 02/16/24 0746    Specimen: Blood from Arm, Right Updated: 02/16/24 0756     WBC 5.34 10*3/mm3      RBC 3.61 10*6/mm3      Hemoglobin 10.9 g/dL      Hematocrit 34.2 %      MCV 94.7 fL      MCH 30.2 pg      MCHC 31.9 g/dL      RDW 14.9 %      RDW-SD 50.6 fl      MPV 11.0 fL      Platelets 102 10*3/mm3      Neutrophil % 77.1 %      Lymphocyte % 15.0 %      Monocyte % 6.9 %      Eosinophil % 0.2 %      Basophil % 0.2 %      Immature Grans % 0.6 %      Neutrophils, Absolute 4.12 10*3/mm3      Lymphocytes, Absolute 0.80 10*3/mm3      Monocytes, Absolute 0.37 10*3/mm3      Eosinophils, Absolute 0.01 10*3/mm3      Basophils, Absolute 0.01 10*3/mm3      Immature Grans, Absolute 0.03 10*3/mm3      nRBC 0.0 /100 WBC     Procalcitonin [[id number]]  (Abnormal) Collected: 02/16/24 0746    Specimen: Blood from Arm, Right Updated: 02/16/24 1307     Procalcitonin 1.90 ng/mL     Narrative:      As a Marker for Sepsis (Non-Neonates):    1. <0.5 ng/mL represents a low risk of severe sepsis and/or septic shock.  2. >2 ng/mL represents a high risk of severe sepsis and/or septic shock.    As a Marker for Lower Respiratory Tract Infections that require antibiotic therapy:    PCT on Admission    Antibiotic Therapy       6-12 Hrs later    >0.5                Strongly Recommended  >0.25 - <0.5        Recommended  0.1 - 0.25          Discouraged              Remeasure/reassess PCT  <0.1                Strongly Discouraged     Remeasure/reassess PCT    As 28 day mortality risk marker:  "\"Change in Procalcitonin Result\" (>80% or <=80%) if Day 0 (or Day 1) and Day 4 values are available. Refer to http://www.[facility]-pct-calculator.com    Change in PCT <=80%  A decrease of PCT levels below or equal to 80% defines a positive change in PCT test result representing a higher risk for 28-day all-cause mortality of patients diagnosed with severe sepsis for septic shock.    Change in PCT >80%  A decrease of PCT levels of more than 80% defines a negative change in PCT result representing a lower risk for 28-day all-cause mortality of patients diagnosed with severe sepsis or septic shock.    This test is Prognostic not Diagnostic, if elevated correlate with clinical findings before administering antibiotic treatment.        BNP [[id number]]  (Abnormal) Collected: 02/16/24 0746    Specimen: Blood from Arm, Right Updated: 02/16/24 1257     proBNP 2,200.0 pg/mL     Narrative:      This assay is used as an aid in the diagnosis of individuals suspected of having heart failure. It can be used as an aid in the diagnosis of acute decompensated heart failure (ADHF) in patients presenting with signs and symptoms of ADHF to the emergency department (ED). In addition, NT-proBNP of <300 pg/mL indicates ADHF is not likely.    Age Range Result Interpretation  NT-proBNP Concentration (pg/mL:      <50             Positive            >450                   Gray                 300-450                    Negative             <300    50-75           Positive            >900                  Gray                300-900                  Negative            <300      >75             Positive            >1800                  Gray                300-1800                  Negative            <300    TSH Rfx On Abnormal To Free T4 [[id number]]  (Abnormal) Collected: 02/16/24 0746    Specimen: Blood from Arm, Right Updated: 02/16/24 1320     TSH 7.230 uIU/mL     T4, Free [[id number]]  (Normal) Collected: 02/16/24 0746    Specimen: Blood from " Arm, Right Updated: 02/16/24 1351     Free T4 1.40 ng/dL     Narrative:      Results may be falsely increased if patient taking Biotin.      C-reactive Protein [[id number]]  (Abnormal) Collected: 02/16/24 0746    Specimen: Blood from Arm, Right Updated: 02/16/24 1530     C-Reactive Protein 8.90 mg/dL     COVID-19, FLU A/B, RSV PCR 1 HR TAT - Swab, Nasopharynx [[id number]]  (Normal) Collected: 02/16/24 0753    Specimen: Swab from Nasopharynx Updated: 02/16/24 0833     COVID19 Not Detected     Influenza A PCR Not Detected     Influenza B PCR Not Detected     RSV, PCR Not Detected    Narrative:      Fact sheet for providers: https://www.fda.gov/media/086504/download    Fact sheet for patients: https://www.fda.gov/media/790557/download    Test performed by PCR.    POC Glucose Once [[id number]]  (Abnormal) Collected: 02/16/24 0934    Specimen: Blood Updated: 02/16/24 1119     Glucose 106 mg/dL      Comment: Serial Number: [id number]:  [id number]       Blood Culture - Blood, Arm, Left [[id number]] Collected: 02/16/24 0937    Specimen: Blood from Arm, Left Updated: 02/16/24 0945    Body Fluid Culture - Body Fluid, Peritoneum [[id number]] Collected: 02/16/24 1030    Specimen: Body Fluid from Peritoneum Updated: 02/16/24 1151     Gram Stain No WBCs seen      No organisms seen    Body Fluid Cell Count With Differential - Body Fluid, Peritoneum [[id number]] Collected: 02/16/24 1030    Specimen: Body Fluid from Peritoneum Updated: 02/16/24 1136    Narrative:      The following orders were created for panel order Body Fluid Cell Count With Differential - Body Fluid, Peritoneum.  Procedure                               Abnormality         Status                     ---------                               -----------         ------                     Body fluid cell count - ...[[id number]]                      Final result               Body fluid differential ...[[id number]]                      Final result                 Please  view results for these tests on the individual orders.    Body fluid cell count - Body Fluid, Peritoneum [[id number]] Collected: 02/16/24 1030    Specimen: Body Fluid from Peritoneum Updated: 02/16/24 1101     Color, Fluid Colorless     Appearance, Fluid Clear     Nucleated Cells, Fluid 27 /mm3      RBC, Fluid <2,000 /mm3     Narrative:      No reference range established. Physician to interpret results with clinical findings.    Body fluid differential - Body Fluid, Peritoneum [[id number]] Collected: 02/16/24 1030    Specimen: Body Fluid from Peritoneum Updated: 02/16/24 1136     Neutrophils, Fluid 56 %      Lymphocytes, Fluid 20 %      Monocytes, Fluid 8 %      Macrophage, Fluid 16 %     Urinalysis With Culture If Indicated - Urine, Clean Catch [[id number]]  (Abnormal) Collected: 02/16/24 1127    Specimen: Urine, Clean Catch Updated: 02/16/24 1218     Color, UA Dark Yellow     Appearance, UA Clear     pH, UA 5.5     Specific Gravity, UA 1.016     Glucose, UA Negative     Ketones, UA Trace     Bilirubin, UA Negative     Blood, UA Negative     Protein,  mg/dL (2+)     Leuk Esterase, UA Negative     Nitrite, UA Negative     Urobilinogen, UA 0.2 E.U./dL    Narrative:      In absence of clinical symptoms, the presence of pyuria, bacteria, and/or nitrites on the urinalysis result does not correlate with infection.    Urinalysis, Microscopic Only - Urine, Clean Catch [[id number]]  (Abnormal) Collected: 02/16/24 1127    Specimen: Urine, Clean Catch Updated: 02/16/24 1218     RBC, UA None Seen /HPF      WBC, UA 0-2 /HPF      Comment: Urine culture not indicated.        Bacteria, UA Trace /HPF      Squamous Epithelial Cells, UA 0-2 /HPF      Yeast, UA Small/1+ Yeast /HPF      Hyaline Casts, UA 0-2 /LPF      Methodology Manual Light Microscopy    Legionella Antigen, Urine - Urine, Urine, Clean Catch [[id number]]  (Normal) Collected: 02/16/24 1127    Specimen: Urine, Clean Catch Updated: 02/16/24 1322     LEGIONELLA ANTIGEN,  URINE Negative    S. Pneumo Ag Urine or CSF - Urine, Urine, Clean Catch [[id number]]  (Normal) Collected: 02/16/24 1127    Specimen: Urine, Clean Catch Updated: 02/16/24 1322     Strep Pneumo Ag Negative    STAT Lactic Acid, Reflex [[id number]]  (Abnormal) Collected: 02/16/24 1132    Specimen: Blood from Arm, Right Updated: 02/16/24 1204     Lactate 3.4 mmol/L     STAT Lactic Acid, Reflex [[id number]]  (Abnormal) Collected: 02/16/24 1405    Specimen: Blood from Hand, Right Updated: 02/16/24 1455     Lactate 5.3 mmol/L     MRSA Screen, PCR (Inpatient) - Swab, Nares [[id number]] Collected: 02/16/24 1447    Specimen: Swab from Nares Updated: 02/16/24 1451    Respiratory Panel PCR w/COVID-19(SARS-CoV-2) RA/TANIA/GAVIN/PAD/COR/NENA In-House, NP Swab in UTM/VTM, 2 HR TAT - Swab, Nasopharynx [[id number]]  (Normal) Collected: 02/16/24 1447    Specimen: Swab from Nasopharynx Updated: 02/16/24 1622     ADENOVIRUS, PCR Not Detected     Coronavirus 229E Not Detected     Coronavirus HKU1 Not Detected     Coronavirus NL63 Not Detected     Coronavirus OC43 Not Detected     COVID19 Not Detected     Human Metapneumovirus Not Detected     Human Rhinovirus/Enterovirus Not Detected     Influenza A PCR Not Detected     Influenza B PCR Not Detected     Parainfluenza Virus 1 Not Detected     Parainfluenza Virus 2 Not Detected     Parainfluenza Virus 3 Not Detected     Parainfluenza Virus 4 Not Detected     RSV, PCR Not Detected     Bordetella pertussis pcr Not Detected     Bordetella parapertussis PCR Not Detected     Chlamydophila pneumoniae PCR Not Detected     Mycoplasma pneumo by PCR Not Detected    Narrative:      In the setting of a positive respiratory panel with a viral infection PLUS a negative procalcitonin without other underlying concern for bacterial infection, consider observing off antibiotics or discontinuation of antibiotics and continue supportive care. If the respiratory panel is positive for atypical bacterial infection  (Bordetella pertussis, Chlamydophila pneumoniae, or Mycoplasma pneumoniae), consider antibiotic de-escalation to target atypical bacterial infection.             Imaging:    CT Abdomen Pelvis Without Contrast    Result Date: 2/16/2024  PROCEDURE: CT ABDOMEN PELVIS WO CONTRAST  COMPARISON: [facility], CR, XR CHEST 1 VW, 2/16/2024, 13:50.  [facility], CR, XR CHEST PA AND LATERAL, 9/19/2023, 10:40.  [facility], CR, XR CHEST 1 VW, 2/16/2024, 7:55.  [facility], CT, CT CHEST WO CONTRAST DIAGNOSTIC, 12/06/2023, 11:11.  INDICATIONS: peritonitis, persistent fever  TECHNIQUE: CT images were created without intravenous contrast.   PROTOCOL:   Standard imaging protocol performed    RADIATION:   DLP: 268mGy*cm   Automated exposure control was utilized to minimize radiation dose.  FINDINGS:  Extensive right lower lobe pneumonia.  Left lung base is clear.  Coronary artery calcifications present.  Lack of contrast limits assessment of abdominal organs and vasculature.  The liver is normal in size and contour.  There is elevation of the right hemidiaphragm.  Spleen and adrenal glands are unremarkable.  Pancreas without findings of pancreatitis.  Gallbladder present.  No pericholecystic inflammation.  Several low-attenuation bilateral renal lesions likely cysts, similar to the prior study, incompletely assessed without contrast.  There is a chronic calculus at the right renal pelvis which measures 1.1 cm x 0.8 cm, unchanged.  Negative for hydronephrosis or hydroureter.  No obstructing distal ureteral calculus.  Urinary bladder is collapsed.  No bowel obstruction.  Colonic diverticulosis without diverticulitis.  Pneumoperitoneum in the abdomen similar to prior chest CT likely related to patient's peritoneal dialysis catheter which is located in the pelvis.  No fluid collection surrounding the dialysis catheter.  Stomach and proximal small bowel are normally  configured.  No findings of appendicitis.  Extensive vascular calcifications of the abdominal aorta and branch vasculature without aneurysm.  Chronic height loss at the superior endplate of L3 with partial ankylosis at the L2-3 level.  Chronic retropulsion of the posterior aspect of the mid L3 vertebral body to the canal contribute to suspected moderate canal stenosis at L3.  No aggressive osseous lesion or acute fracture.  Right hip prosthesis partially imaged.        1. Extensive right lower lobe pneumonia. 2. Pneumoperitoneum similar to prior chest CT likely related to peritoneal dialysis catheter.  No organized fluid collection or abscess. 3. Nonobstructing 11 mm calculus at the right renal pelvis unchanged. 4. Additional chronic findings above.     LISY SANTILLAN MD       Electronically Signed and Approved By: LISY SANTILLAN MD on 2/16/2024 at 14:46             XR Chest 1 View    Result Date: 2/16/2024  PROCEDURE: XR CHEST 1 VW  COMPARISON: [facility], CT, CT ABDOMEN PELVIS WO CONTRAST, 2/16/2024, 14:16.  [facility], CR, XR CHEST 1 VW, 2/16/2024, 7:55.  INDICATIONS: worsining hypoxia  FINDINGS:  Unchanged cardiomediastinal silhouette.  Low lung volumes.  Right lower lung airspace opacity.  The no large pleural effusion or visible pneumothorax.  Pneumoperitoneum.  No acute osseous abnormality.  Probable healed left-sided rib fractures.       Right lower lobe airspace opacity consistent with pneumonia.  Pneumoperitoneum, better seen on same day CT of the abdomen/pelvis.       JOSH HAMPTON MD       Electronically Signed and Approved By: JOSH HAMPTON MD on 2/16/2024 at 14:30             XR Chest 1 View    Result Date: 2/16/2024  PROCEDURE: XR CHEST 1 VW  COMPARISON: [facility], CR, XR CHEST PA AND LATERAL, 9/19/2023, 10:40.  [facility], CT, CT CHEST WO CONTRAST DIAGNOSTIC, 12/06/2023, 11:11.  INDICATIONS: Fever  FINDINGS:  Heart size normal.   Mild elevation the right hemidiaphragm, stable.  Lungs without consolidation.  Negative for pneumothorax or pleural effusion.  External catheter tubing overlies the upper abdomen.  Aortic arch atherosclerosis.        No acute process.       LISY SANTILLAN MD       Electronically Signed and Approved By: LISY SANTILLAN MD on 2/16/2024 at 8:07                Differential Diagnosis and Discussion:    Fever: Based on the complaint of fever, differential diagnosis includes but is not limited to meningitis, pneumonia, pyelonephritis, acute uti,  systemic immune response syndrome, sepsis, viral syndrome, fungal infection, tick born illness and other bacterial infections.  Weakness: Based on the patient's history, signs, and symptoms, the diffential diagnosis includes but is not limited to meningitis, stroke, sepsis, subarachnoid hemorrhage, intracranial bleeding, encephalitis, acute uti, dehydration, MS, myasthenia gravis, Guillan Patterson, migraine variant, neuromuscular disorders vertigo, electrolyte imbalance, and metabolic disorders.    All labs were reviewed and interpreted by me.    MDM     Amount and/or Complexity of Data Reviewed  Clinical lab tests: reviewed  Tests in the radiology section of CPT®: reviewed  Decide to obtain previous medical records or to obtain history from someone other than the patient: yes         Critical Care Note: Total Critical Care time of 55 minutes. Total critical care time documented does not include time spent on separately billed procedures for services of nurses or physician assistants. I personally saw and examined the patient. I have reviewed all diagnostic interpretations and treatment plans as written. I was present for the key portions of any procedures performed and the inclusive time noted in any critical care statement. Critical care time includes patient management by me, time spent at the patients bedside,  time to review lab and imaging results, discussing patient care,  documentation in the medical record, and time spent with family or caregiver.    Sepsis criteria was met in the emergency department and the Sepsis protocol (including antibiotic administration) was initiated.      SIRS criteria considered:   1.  Temperature > 100.4 or <98.6    2.  Heart Rate > 90    3.  Respiratory Rate > 22    4.  WBC > 12K or <4K.             Severe Sepsis:     Respiratory: Mechanical Ventilation or Bipap  Hypotension: SBP > 90 or MAP < 65  Renal: Creatinine > 2  Metabolic: Lactic Acid > 2  Hematologic: Platelets < 100K or INR > 1.5  Hepatic: BILI  >  2  CNS: Sudden AMS     Septic Shock:     Severe Sepsis + Persistent hypotension or Lactic Acid > 4     Normal saline bolus, Antibiotics, and final disposition was based on these definitions.        Sepsis was recognized at 0930    Antibiotics were ordered.     30 cc/kg bolus was not indicated.       Patient did not receive the recommended 30ml/kg fluid bolus for sepsis because it would be harmful or detrimental to the patient.    The patient has Renal Failure.   The patient was ordered 2000 mL of fluids.    Total Critical Care time of 55 minutes. Total critical care time documented does not include time spent on separately billed procedures for services of nurses or physician assistants. I personally saw and examined the patient. I have reviewed all diagnostic interpretations and treatment plans as written. I was present for the key portions of any procedures performed and the inclusive time noted in any critical care statement. Critical care time includes patient management by me, time spent at the patients bedside,  time to review lab and imaging results, discussing patient care, documentation in the medical record, and time spent with family or caregiver.    Patient Care Considerations:    CT CHEST: I considered ordering a CT scan of the chest, however this can be ordered as an inpatient.      Consultants/Shared Management Plan:    Hospitalist: GERARDO  have discussed the case with hospitalist who agrees to accept the patient for admission.    Social Determinants of Health:    Patient is unable to carry out activities of daily life. Escalation of care is necessary.       Disposition and Care Coordination:    Admit:   Through independent evaluation of the patient's history, physical, and imperical data, the patient meets criteria for inpatient admission to the hospital.        Final diagnoses:   Febrile illness   Weakness   End stage renal disease   Sepsis, due to unspecified organism, unspecified whether acute organ dysfunction present        ED Disposition       ED Disposition   Decision to Admit    Condition   --    Comment   Level of Care: Critical Care [6]   Diagnosis: Peritonitis [[id number]]   Certification: I Certify That Inpatient Hospital Services Are Medically Necessary For Greater Than 2 Midnights                 This medical record created using voice recognition software.             Fredrick Mclaughlin, DO  02/16/24 [id number]

## 2024-02-17 ENCOUNTER — APPOINTMENT (OUTPATIENT)
Dept: CT IMAGING | Facility: HOSPITAL | Age: 79
DRG: 871 | End: 2024-02-17
Payer: MEDICARE

## 2024-02-17 LAB
ANION GAP SERPL CALCULATED.3IONS-SCNC: 17.4 MMOL/L (ref 5–15)
ANISOCYTOSIS BLD QL: ABNORMAL
BUN SERPL-MCNC: 44 MG/DL (ref 8–23)
BUN/CREAT SERPL: 6.6 (ref 7–25)
BURR CELLS BLD QL SMEAR: ABNORMAL
C COLI+JEJ+UPSA DNA STL QL NAA+NON-PROBE: NOT DETECTED
CALCIUM SPEC-SCNC: 7.7 MG/DL (ref 8.6–10.5)
CHLORIDE SERPL-SCNC: 99 MMOL/L (ref 98–107)
CO2 SERPL-SCNC: 19.6 MMOL/L (ref 22–29)
CREAT SERPL-MCNC: 6.62 MG/DL (ref 0.76–1.27)
D-LACTATE SERPL-SCNC: 2 MMOL/L (ref 0.5–2)
D-LACTATE SERPL-SCNC: 2.3 MMOL/L (ref 0.5–2)
D-LACTATE SERPL-SCNC: 2.3 MMOL/L (ref 0.5–2)
D-LACTATE SERPL-SCNC: 2.8 MMOL/L (ref 0.5–2)
D-LACTATE SERPL-SCNC: 3.7 MMOL/L (ref 0.5–2)
DACRYOCYTES BLD QL SMEAR: ABNORMAL
DEPRECATED RDW RBC AUTO: 52.3 FL (ref 37–54)
EC STX1+STX2 GENES STL QL NAA+NON-PROBE: NOT DETECTED
EGFRCR SERPLBLD CKD-EPI 2021: 8 ML/MIN/1.73
ERYTHROCYTE [DISTWIDTH] IN BLOOD BY AUTOMATED COUNT: 15.1 % (ref 12.3–15.4)
GLUCOSE SERPL-MCNC: 162 MG/DL (ref 65–99)
HCT VFR BLD AUTO: 32.1 % (ref 37.5–51)
HGB BLD-MCNC: 10 G/DL (ref 13–17.7)
LYMPHOCYTES # BLD MANUAL: 1.75 10*3/MM3 (ref 0.7–3.1)
LYMPHOCYTES NFR BLD MANUAL: 7.9 % (ref 5–12)
MACROCYTES BLD QL SMEAR: ABNORMAL
MAGNESIUM SERPL-MCNC: 1.1 MG/DL (ref 1.6–2.4)
MCH RBC QN AUTO: 29.9 PG (ref 26.6–33)
MCHC RBC AUTO-ENTMCNC: 31.2 G/DL (ref 31.5–35.7)
MCV RBC AUTO: 96.1 FL (ref 79–97)
METAMYELOCYTES NFR BLD MANUAL: 5.6 % (ref 0–0)
MONOCYTES # BLD: 0.77 10*3/MM3 (ref 0.1–0.9)
NEUTROPHILS # BLD AUTO: 6.66 10*3/MM3 (ref 1.7–7)
NEUTROPHILS NFR BLD MANUAL: 53.9 % (ref 42.7–76)
NEUTS BAND NFR BLD MANUAL: 14.6 % (ref 0–5)
NRBC SPEC MANUAL: 1.1 /100 WBC (ref 0–0.2)
OVALOCYTES BLD QL SMEAR: ABNORMAL
PHOSPHATE SERPL-MCNC: 5.3 MG/DL (ref 2.5–4.5)
PLAT MORPH BLD: NORMAL
PLATELET # BLD AUTO: 102 10*3/MM3 (ref 140–450)
PMV BLD AUTO: 11.4 FL (ref 6–12)
POTASSIUM SERPL-SCNC: 3.7 MMOL/L (ref 3.5–5.2)
RBC # BLD AUTO: 3.34 10*6/MM3 (ref 4.14–5.8)
S ENT+BONG DNA STL QL NAA+NON-PROBE: NOT DETECTED
SHIGELLA SP+EIEC IPAH ST NAA+NON-PROBE: NOT DETECTED
SODIUM SERPL-SCNC: 136 MMOL/L (ref 136–145)
VANCOMYCIN SERPL-MCNC: 19.7 MCG/ML (ref 5–40)
VARIANT LYMPHS NFR BLD MANUAL: 14.6 % (ref 19.6–45.3)
VARIANT LYMPHS NFR BLD MANUAL: 3.4 % (ref 0–5)
WBC MORPH BLD: NORMAL
WBC NRBC COR # BLD AUTO: 9.71 10*3/MM3 (ref 3.4–10.8)

## 2024-02-17 PROCEDURE — 83735 ASSAY OF MAGNESIUM: CPT | Performed by: INTERNAL MEDICINE

## 2024-02-17 PROCEDURE — 80202 ASSAY OF VANCOMYCIN: CPT | Performed by: INTERNAL MEDICINE

## 2024-02-17 PROCEDURE — 99291 CRITICAL CARE FIRST HOUR: CPT | Performed by: INTERNAL MEDICINE

## 2024-02-17 PROCEDURE — 94799 UNLISTED PULMONARY SVC/PX: CPT

## 2024-02-17 PROCEDURE — 84100 ASSAY OF PHOSPHORUS: CPT | Performed by: INTERNAL MEDICINE

## 2024-02-17 PROCEDURE — 25010000002 MAGNESIUM SULFATE 2 GM/50ML SOLUTION: Performed by: NURSE PRACTITIONER

## 2024-02-17 PROCEDURE — 83605 ASSAY OF LACTIC ACID: CPT | Performed by: NURSE PRACTITIONER

## 2024-02-17 PROCEDURE — 85007 BL SMEAR W/DIFF WBC COUNT: CPT | Performed by: INTERNAL MEDICINE

## 2024-02-17 PROCEDURE — 92610 EVALUATE SWALLOWING FUNCTION: CPT

## 2024-02-17 PROCEDURE — 5A1D70Z PERFORMANCE OF URINARY FILTRATION, INTERMITTENT, LESS THAN 6 HOURS PER DAY: ICD-10-PCS | Performed by: FAMILY MEDICINE

## 2024-02-17 PROCEDURE — 25010000002 MAGNESIUM SULFATE 2 GM/50ML SOLUTION: Performed by: STUDENT IN AN ORGANIZED HEALTH CARE EDUCATION/TRAINING PROGRAM

## 2024-02-17 PROCEDURE — 99233 SBSQ HOSP IP/OBS HIGH 50: CPT | Performed by: INTERNAL MEDICINE

## 2024-02-17 PROCEDURE — 85025 COMPLETE CBC W/AUTO DIFF WBC: CPT | Performed by: INTERNAL MEDICINE

## 2024-02-17 PROCEDURE — 25010000002 HEPARIN (PORCINE) PER 1000 UNITS: Performed by: INTERNAL MEDICINE

## 2024-02-17 PROCEDURE — 80048 BASIC METABOLIC PNL TOTAL CA: CPT | Performed by: INTERNAL MEDICINE

## 2024-02-17 PROCEDURE — 25010000002 PIPERACILLIN SOD-TAZOBACTAM PER 1 G: Performed by: NURSE PRACTITIONER

## 2024-02-17 RX ORDER — SODIUM BICARBONATE 650 MG/1
1300 TABLET ORAL 2 TIMES DAILY
Status: DISCONTINUED | OUTPATIENT
Start: 2024-02-17 | End: 2024-02-24 | Stop reason: HOSPADM

## 2024-02-17 RX ORDER — MAGNESIUM SULFATE HEPTAHYDRATE 40 MG/ML
2 INJECTION, SOLUTION INTRAVENOUS ONCE
Status: COMPLETED | OUTPATIENT
Start: 2024-02-17 | End: 2024-02-17

## 2024-02-17 RX ORDER — MAGNESIUM SULFATE HEPTAHYDRATE 40 MG/ML
2 INJECTION, SOLUTION INTRAVENOUS
Status: COMPLETED | OUTPATIENT
Start: 2024-02-17 | End: 2024-02-17

## 2024-02-17 RX ORDER — MIDODRINE HYDROCHLORIDE 10 MG/1
10 TABLET ORAL
Status: DISCONTINUED | OUTPATIENT
Start: 2024-02-17 | End: 2024-02-19

## 2024-02-17 RX ORDER — VANCOMYCIN HYDROCHLORIDE 125 MG/1
125 CAPSULE ORAL EVERY 6 HOURS SCHEDULED
Status: DISCONTINUED | OUTPATIENT
Start: 2024-02-17 | End: 2024-02-24 | Stop reason: HOSPADM

## 2024-02-17 RX ORDER — MIDODRINE HYDROCHLORIDE 5 MG/1
5 TABLET ORAL
Status: DISCONTINUED | OUTPATIENT
Start: 2024-02-17 | End: 2024-02-17

## 2024-02-17 RX ADMIN — BACITRACIN 0.9 G: 500 OINTMENT TOPICAL at 08:39

## 2024-02-17 RX ADMIN — MAGNESIUM SULFATE HEPTAHYDRATE 2 G: 2 INJECTION, SOLUTION INTRAVENOUS at 08:39

## 2024-02-17 RX ADMIN — MIDODRINE HYDROCHLORIDE 5 MG: 5 TABLET ORAL at 10:20

## 2024-02-17 RX ADMIN — ACETAMINOPHEN 650 MG: 325 TABLET ORAL at 03:14

## 2024-02-17 RX ADMIN — VANCOMYCIN HYDROCHLORIDE 125 MG: 125 CAPSULE ORAL at 23:28

## 2024-02-17 RX ADMIN — Medication 10 ML: at 20:30

## 2024-02-17 RX ADMIN — HEPARIN SODIUM 5000 UNITS: 5000 INJECTION INTRAVENOUS; SUBCUTANEOUS at 20:29

## 2024-02-17 RX ADMIN — NOREPINEPHRINE BITARTRATE 0.1 MCG/KG/MIN: 0.03 INJECTION, SOLUTION INTRAVENOUS at 14:56

## 2024-02-17 RX ADMIN — SODIUM BICARBONATE 650 MG TABLET 1300 MG: at 11:51

## 2024-02-17 RX ADMIN — HEPARIN SODIUM 5000 UNITS: 5000 INJECTION INTRAVENOUS; SUBCUTANEOUS at 08:38

## 2024-02-17 RX ADMIN — MAGNESIUM SULFATE HEPTAHYDRATE 2 G: 2 INJECTION, SOLUTION INTRAVENOUS at 04:08

## 2024-02-17 RX ADMIN — VANCOMYCIN HYDROCHLORIDE 125 MG: 125 CAPSULE ORAL at 18:26

## 2024-02-17 RX ADMIN — SODIUM CHLORIDE, SODIUM LACTATE, CALCIUM CHLORIDE, MAGNESIUM CHLORIDE AND DEXTROSE 1500 ML: 1.5; 538; 448; 18.3; 5.08 INJECTION, SOLUTION INTRAPERITONEAL at 00:39

## 2024-02-17 RX ADMIN — ACETAMINOPHEN 650 MG: 325 TABLET ORAL at 08:55

## 2024-02-17 RX ADMIN — Medication 10 ML: at 08:40

## 2024-02-17 RX ADMIN — ACETAMINOPHEN 650 MG: 325 TABLET ORAL at 23:28

## 2024-02-17 RX ADMIN — VANCOMYCIN HYDROCHLORIDE 125 MG: 125 CAPSULE ORAL at 11:51

## 2024-02-17 RX ADMIN — SODIUM CHLORIDE, SODIUM LACTATE, CALCIUM CHLORIDE, MAGNESIUM CHLORIDE AND DEXTROSE 1500 ML: 1.5; 538; 448; 18.3; 5.08 INJECTION, SOLUTION INTRAPERITONEAL at 13:03

## 2024-02-17 RX ADMIN — NOREPINEPHRINE BITARTRATE 0.16 MCG/KG/MIN: 0.03 INJECTION, SOLUTION INTRAVENOUS at 00:39

## 2024-02-17 RX ADMIN — MAGNESIUM SULFATE HEPTAHYDRATE 2 G: 2 INJECTION, SOLUTION INTRAVENOUS at 10:20

## 2024-02-17 RX ADMIN — PIPERACILLIN AND TAZOBACTAM 4.5 G: 4; .5 INJECTION, POWDER, FOR SOLUTION INTRAVENOUS at 08:38

## 2024-02-17 RX ADMIN — SODIUM CHLORIDE, SODIUM LACTATE, CALCIUM CHLORIDE, MAGNESIUM CHLORIDE AND DEXTROSE 1500 ML: 1.5; 538; 448; 18.3; 5.08 INJECTION, SOLUTION INTRAPERITONEAL at 17:44

## 2024-02-17 RX ADMIN — SODIUM CHLORIDE, SODIUM LACTATE, CALCIUM CHLORIDE, MAGNESIUM CHLORIDE AND DEXTROSE 1500 ML: 1.5; 538; 448; 18.3; 5.08 INJECTION, SOLUTION INTRAPERITONEAL at 07:17

## 2024-02-17 RX ADMIN — SODIUM CHLORIDE, SODIUM LACTATE, CALCIUM CHLORIDE, MAGNESIUM CHLORIDE AND DEXTROSE 1500 ML: 1.5; 538; 448; 18.3; 5.08 INJECTION, SOLUTION INTRAPERITONEAL at 23:28

## 2024-02-17 RX ADMIN — PIPERACILLIN AND TAZOBACTAM 4.5 G: 4; .5 INJECTION, POWDER, FOR SOLUTION INTRAVENOUS at 20:29

## 2024-02-17 RX ADMIN — MIDODRINE HYDROCHLORIDE 5 MG: 5 TABLET ORAL at 11:51

## 2024-02-17 RX ADMIN — SODIUM BICARBONATE 650 MG TABLET 1300 MG: at 20:30

## 2024-02-17 RX ADMIN — MIDODRINE HYDROCHLORIDE 10 MG: 5 TABLET ORAL at 17:44

## 2024-02-17 RX ADMIN — BACITRACIN 0.9 G: 500 OINTMENT TOPICAL at 20:30

## 2024-02-17 NOTE — THERAPY EVALUATION
Acute Care - Speech Language Pathology   Swallow Initial Evaluation  Nate     Patient Name: Nelson Wang  : 1945  MRN: [id number]  Today's Date: 2024               Admit Date: 2024    Visit Dx:     ICD-10-CM ICD-9-CM   1. Febrile illness  R50.9 780.60   2. Weakness  R53.1 780.79   3. End stage renal disease  N18.6 585.6   4. Sepsis, due to unspecified organism, unspecified whether acute organ dysfunction present  A41.9 038.9     995.91     Patient Active Problem List   Diagnosis    Essential hypertension    Bradycardia, sinus    Anemia due to chronic kidney disease    Hypothyroidism    Idiopathic gout    Proteinuria    Psoriasis    Thrombocytopenia    Type 2 diabetes mellitus without complication    CKD (chronic kidney disease), stage IV    Hyperlipidemia    Monoclonal gammopathy of unknown significance (MGUS)    Stage 5 chronic kidney disease    Peritoneal dialysis catheter in place    Chronic gout without tophus    Peritoneal dialysis catheter dysfunction    Medicare annual wellness visit, subsequent    Chronic cough    Peritonitis     Past Medical History:   Diagnosis Date    Anemia     Ankle pain, right     CKD (chronic kidney disease), stage IV     Diabetes     Gout     High cholesterol     HL (hearing loss)     Hyperkalemia     Hypertension     Hypothyroidism     Psoriasis     Vitiligo      Past Surgical History:   Procedure Laterality Date    ANKLE SURGERY  1990    APPENDECTOMY N/A     COLONOSCOPY N/A 2022    [facility]    HIP BIPOLAR REPLACEMENT Right 2000    INSERTION PERITONEAL DIALYSIS CATHETER N/A 3/27/2023    Procedure: LAPAROSCOPIC INSERTION PERITONEAL DIALYSIS CATHETER, LAPAROSCOPIC OMENTOPEXY WITH LYSIS OF ADHESIONS;  Surgeon: Jose Berry MD;  Location: [facility];  Service: General;  Laterality: N/A;    INSERTION PERITONEAL DIALYSIS CATHETER Left 2023    Procedure: REVISION OF PERITONEAL DIALYSIS CATHETER;  Surgeon: Radha Oreilly  MD;  Location: [facility] MAIN OR;  Service: General;  Laterality: Left;    RENAL BIOPSY Left 07/15/2022           Inpatient Speech Pathology Dysphagia Evaluation        PAIN SCALE: None indicated.    PRECAUTIONS/CONTRAINDICATIONS: Enteric    SUSPECTED ABUSE/NEGLECT/EXPLOITATION: None indicated.    SOCIAL/PSYCHOLOGICAL NEEDS/BARRIERS: None indicated.    PAST SOCIAL HISTORY: 79-year-old male lives at home with family    PRIOR FUNCTION: Independent    PATIENT GOALS/EXPECTATIONS: Continue eating orally    HISTORY: 79-year-old male with the above diagnosis referred for speech therapy evaluation to assess for swallowing.  Concern with pneumonia, possible aspiration.  No previous speech pathology services are reported.  Patient denies reflux.    CURRENT DIET LEVEL: Regular, thin    OBJECTIVE:    TEST ADMINISTERED: Clinical dysphagia evaluation    COGNITION/SAFETY AWARENESS: Patient followed directions and responded to questions without difficulty    BEHAVIORAL OBSERVATIONS: Alert and cooperative    ORAL MOTOR EXAM: Grossly within functional limits    VOICE QUALITY: Adequate    REFLEX EXAM: Patient demonstrated throat clear  POSTURE: Sitting up independently, sitting at side of bed    FEEDING/SWALLOWING FUNCTION: Assessed with nectar liquid, thin liquids, puréed solids, crunchy solid.    CLINICAL OBSERVATIONS: Patient noted with some throat clearing prior to ministering substances.  Nectar liquid by cup appeared timely with vocal quality remaining clear to cervical auscultation.  Thin liquid by cup and by straw appeared timely with vocal quality remaining clear to cervical auscultation.  Purée solid with swallow completed with laryngeal elevation noted to palpation.  Crunchy solid with adequate chewing followed by swallow completed clearing the oral cavity.    DYSPHAGIA CRITERIA: Swallow appears grossly functional for nutritional needs.  No overt clinical signs or symptoms of aspiration were noted at the bedside.  Note silent  aspiration cannot be ruled out at the bedside.    FUNCTIONAL ASSESSMENT INSTRUMENT: Patient currently scored a level 7 of 7 on Functional Communication Measures for swallowing indicating a 0% limitation in function.    ASSESSMENT/ PLAN OF CARE:  No direct speech therapy is recommended at this time. Recommend rereferral should patient demonstrate change in status.    RECOMMENDATIONS:   1.   DIET: Regular solids cut small, thin liquid.    2.  POSITION: Positioning fully upright for all p.o. intake and 30 minutes following.    3.  COMPENSATORY STRATEGIES: Alternate small bites and small sips of solids and liquids at a slow rate.    4.  If aspiration continues to be suspected, would recommend Modified Barium Swallow Study to rule out aspiration.    Pt/responsible party agrees with plan of care and has been informed of all alternatives, risks and benefits.    Addendum: 2/21/2024  Following results of modified barium swallow study of 2/19/2024 adjusted plan of care.    DYSPHAGIA CRITERIA:  Dr. Wang demonstrated oropharyngeal dysphagia characterized by likely xerostomia, swallow delay, decreased laryngeal elevation at times.  Laryngeal penetration and silent aspiration were noted with thin liquids.  Please see radiologist report for esophageal phase of swallow.     FUNCTIONAL DEFICIT: Patient scored level 5 of 7 on Functional Communication Measures for swallowing indicating a 20-39% limitation in function for current status, goal status, and discharge status.      ASSESSMENT/ PLAN OF CARE:  Pt presents with limitations, noted below, that impede his ability to swallow safely and maintain nutrition. The skills of a therapist will be required to safely and effectively implement the following treatment plan to restore maximal level of function.    PROBLEMS:  1.  Swallow delay, decreased laryngeal ovation, risk of aspiration                       LTG 1: 30 days: Patient will tolerate least restrictive diet utilizing  appropriate positioning and strategies with minimal assistance.                       STG 1a: 14 days: Patient will tolerate diet of regular soft solids and nectar thickened liquids with minimal assistance for strategies.                       STG 1b: 14 days: Patient will participate in oral motor exercise program with strength/range of motion 4/5.                       STG 1d: 14 days: Patient/family education.                       TREATMENT: Dysphagia therapy to address swallow function through exercises and education of strategies.     FREQUENCY/DURATION: Once daily 5 times per week    REHAB POTENTIAL:  Pt has fair to good rehab potential.  The following limitations may influence improvement/ length of tx: Medical status.    RECOMMENDATIONS:   1.  Diet: Regular soft moist solids, nectar thickened liquid.  No straw.  2.  Positioning fully upright for all p.o. intake and 30 minutes following.  3.  Alternate small bites and small sips of solids and liquids at a slow rate.  Small single sips of liquid with no straw.  Medications whole in applesauce. Reflux precautions.      Pt/responsible party agrees with plan of care and has been informed of all alternatives, risks and benefits.                                                                                                EDUCATION  The patient has been educated in the following areas:   Modified Diet Instruction.              Time Calculation:                Katie Isaacs, SLP  2/17/2024

## 2024-02-17 NOTE — PROGRESS NOTES
Respiratory Therapist Broncho-Pulmonary Hygiene Progress Note      Patient Name:  Nelson Wang  YOB: 1945    Nelson Wang meets the qualification for Level 1 of the Bronco-Pulmonary Hygiene Protocol. This was based on my daily patient assessment and includes review of chest x-ray results, cough ability and quality, oxygenation, secretions or risk for secretion development and patient mobility.     Broncho-Pulmonary Hygiene Assessment:    Level of Movement: Actively changing positions without assistance  Alert/ oriented/ cooperative    Breath Sounds: Clear to slightly diminished    Cough: Strong, effective    Chest X-Ray: Possible signs of consolidation and/or atelectasis or clear.     Sputum Productions: None or small amount of thin or watery secretions with effective cough    History and Physical: None    SpO2 to Oxygen Need: greater than 92% on room air or  less than 3L nasal canula    Current SpO2 is: 93% on Room Air    Based on this information, I have completed the following interventions: Teach/Instruct patient on cough and deep breathe      Electronically signed by Ruth Low RRT, 02/17/24, 12:40 PM EST.

## 2024-02-17 NOTE — PROGRESS NOTES
Pulmonary / Critical Care Progress Note      Patient Name: Nelson aWng  : 1945  MRN: [id number]  Attending:  Garland Veronica, *   Date of admission: 2024    Subjective   Subjective   Patient critically ill with septic shock, right lower lobe pneumonia    Over past 24 hours: Patient admitted to the ICU, had CT scan of abdomen showed dense consolidation right lower lobe, remained on Levophed, central line placed, on antibiotics    Overnight, no acute events, O2 weaned, having frequent diarrhea    This morning  Patient is awake, he is in no acute distress on room air  Patient reports he is feeling much better  Minimal cough and secretions  On Levophed at 0.1 mcg/kg/min  C. difficile PCR positive, is having diarrhea  Tmax 100.2  Lactic acid trending down, now at 3.7  Magnesium low  Receiving PD exchanges per nephrology  Blood cultures 2/2 E. coli    Review of Systems  Constitutional symptoms:  Denied complaints   Ear, nose, throat: Denied complaints  Cardiovascular:  Denied complaints  Respiratory: Denied complaints  Gastrointestinal: Denied complaints  Musculoskeletal: Denied complaints  Neurologic: Denied complaints  Skin: Denied complaints        Objective   Objective     Vitals:   Vital signs for last 24 hours:  Temp:  [98.8 °F (37.1 °C)-100.2 °F (37.9 °C)] 98.8 °F (37.1 °C)  Heart Rate:  [] 120  Resp:  [19-25] 20  BP: ()/() 125/100    Intake/Output last 3 shifts:  I/O last 3 completed shifts:  In: 3100 [IV Piggyback:1600]  Out: 1408   Intake/Output this shift:  I/O this shift:  In: 1500   Out: 1303     Physical Exam   Vital Signs Reviewed   WDWN, Alert, NAD.  On room air  HEENT:  PERRL, EOMI.  OP, nares clear  Chest:  good aeration, secretions rhonchi right lower lobe to auscultation bilaterally, tympanic to percussion bilaterally, no work of breathing noted  CV: RRR, no MGR, pulses 2+, equal.  Abd:  Soft, NT, ND, + BS, no HSM  EXT:  no clubbing, no cyanosis, no  edema  Neuro:  A&Ox3, CN grossly intact, no focal deficits.  Skin: No rashes or lesions noted, PD cath in place    Result Review    Result Review:  I have personally reviewed the results from the time of this admission to 2/17/2024 11:41 EST and agree with these findings:  [x]  Laboratory  [x]  Microbiology  [x]  Radiology  []  EKG/Telemetry   [x]  Cardiology/Vascular   []  Pathology  []  Old records  []  Other:  Most notable findings include:   Blood culture 2/2 gram-negative bacilli, BC ID E. coli    CT Abdomen Pelvis Without Contrast    Result Date: 2/16/2024    1. Extensive right lower lobe pneumonia. 2. Pneumoperitoneum similar to prior chest CT likely related to peritoneal dialysis catheter.  No organized fluid collection or abscess. 3. Nonobstructing 11 mm calculus at the right renal pelvis unchanged. 4. Additional chronic findings above.     LISY SANTILLAN MD       Electronically Signed and Approved By: LISY SANTILLAN MD on 2/16/2024 at 14:46                         Lab 02/17/24  0230 02/16/24  0746   WBC 9.71 5.34   HEMOGLOBIN 10.0* 10.9*   HEMATOCRIT 32.1* 34.2*   PLATELETS 102* 102*   SODIUM 136 135*   POTASSIUM 3.7 3.1*   CHLORIDE 99 95*   CO2 19.6* 24.2   BUN 44* 38*   CREATININE 6.62* 7.22*   GLUCOSE 162* 96   CALCIUM 7.7* 8.3*   PHOSPHORUS 5.3*  --    TOTAL PROTEIN  --  5.7*   ALBUMIN  --  3.0*   GLOBULIN  --  2.7         Assessment & Plan   Assessment / Plan     Active Hospital Problems:  Active Hospital Problems    Diagnosis     **Peritonitis      Impression:  Septic shock, present on admission  Right lower lobe pneumonia of unspecified organism  E. coli bacteremia  Concern for aspiration pneumonia  Possible C. difficile colitis  Acute on chronic diarrhea  Clinically significant lactic acidosis  ESRD on peritoneal dialysis  Anemia of CKD  Hypokalemia  Anion gap metabolic acidosis  Psoriasis on chronic immunosuppression    Plan:    -Patient currently on room air  -Continue aspiration  precautions  -Bronchopulmonary hygiene/I-S/os  -Speech therapy evaluated patient, will plan for modified barium on Monday  -Blood culture 2/2 E. Coli  -Continue Zosyn can likely de-escalate to ceftriaxone once confirmed ESBL  -Discontinue IV vancomycin  -Patient is having persistent diarrhea, reports increased in frequency  -C. difficile PCR positive will treat with oral vancomycin in the setting of septic shock  -Check CT scan of chest to evaluate extension of pneumonia  -Recheck CRP tomorrow, consider steroids   -Echocardiogram pending  -Hold antihypertensives  -Trend lactic acid until clear  -Continue Levophed to maintain MAP at 65  -Add midodrine 5 mg 3 times daily  -Nephrology following, PD per their service  -Trend renal function, monitor replace electrolytes, will replace IV magnesium  -Okay to use Tylenol for fever  -Increase activity up to chair daily  -Okay to advance diet per speech recommendations    DVT PPx subcutaneous heparin  Tubes lines and drains, right IJ  DVT prophylaxis:  Medical and mechanical DVT prophylaxis orders are present.    CODE STATUS:      I, BECKY Santana, spent 16 minutes critical care time.    Electronically signed by BECKY Albert, 02/17/24, 11:53 AM EST.    The patient is critically ill in the ICU with shock, peritonitis, lactic acidosis, ESRD on peritoneal dialysis, anemia, hypokalemia, anion gap metabolic acidosis. Multidisciplinary bedside critical care rounds were performed with nursing staff, respiratory therapy, pharmacy, nutritional services, social work. I have personally reviewed the chart, labs and any pertinent imaging available.  We have spent 33 minutes of critical care time, excluding procedures, in the care of this patient. I, Dr Jimenez, spent 17 mins of critical care time according to split shared critical care billing guidelines.      Electronically signed by Preston Jimenez MD, 02/17/24, 4:19 PM EST.

## 2024-02-17 NOTE — PROGRESS NOTES
[facility]   Hospitalist Progress Note  Date: 2024  Patient Name: Nelson Wang  : 1945  MRN: [id number]  Date of admission: 2024  Room/Bed: [facility]      Subjective   Subjective     Chief Complaint: I had a fever and was weak    Summary:Nelson Wang is a 79 y.o. male with past medical history significant for stage renal disease on peritoneal dialysis, hypertension, diabetes mellitus type 2 hypertension as well as psoriasis reports that on yesterday he began having generalized weakness as well as body aches and fever up to 104.  Despite Tylenol reports having recurrent fevers and subsequently presented to the emergency department on this morning for further evaluation.  Patient does report having a nonproductive cough he denies any shortness of breath or chest pain.  Denies any abdominal pain no nausea or vomiting.  He does report having had episodes of diarrhea.      In the emergency department patient presentation temperature is noted to be 101, pulse 93, respirations 16 with blood pressure 128/69 pulse ox 94% on room air.  Repeat blood pressure while in the ED did drop to as low as 68/51.  CBC showed WBC of 5.3 hemoglobin Mattock are 10.9 and 34.2 respectively, platelets 102 neutrophils of 77%.  CMP showed BUN of 38 creatinine 7.22, sodium 135, potassium 3.1, chloride 95, calcium 8.3, total protein 5.7, albumin of 3.0, anion gap of 15.8.  Serum lactate is 3.4 with a repeat of 3.4.  Fluvid/RSV is negative, initial chest x-ray shows no acute process.   Repeat chest x-ray shows right lower lobe airspace opacity.  In the emergency department the patient received IV fluid bolus via sepsis protocol.   Despite this he did require initiation of Levophed however.  He also received Zosyn and vancomycin.  Hospitalist service was consulted for admission for patient with sepsis.    Interval Followup: Patient seen and evaluated on this morning.  He reports that he is feeling much better.  He denies any  shortness of breath no cough at this time.  He is asking when he can be transferred from the ICU.    Review of Systems    All systems reviewed and negative except for what is outlined above.      Objective   Objective     Vitals:   Temp:  [98.8 °F (37.1 °C)-101.4 °F (38.6 °C)] 98.8 °F (37.1 °C)  Heart Rate:  [] 120  Resp:  [19-25] 20  BP: ()/() 125/100  Flow (L/min):  [2-15] 4    Physical Exam   General: Awake, alert, sitting up on the side of the bed NAD  HENT: NCAT, MMM  Eyes: pupils equal, no scleral icterus  Cardiovascular: Tachycardic S1-S2, no appreciable murmurs   Pulmonary: Occasional rhonchi; no wheezes; no conversational dyspnea  Gastrointestinal: S/ND/NT, +BS  Musculoskeletal: No gross deformities  Skin: No jaundice  Neuro: CN II through XII grossly intact; speech clear; no tremor  Psych: Mood and affect appropriate  : No Kolb catheter; no suprapubic tenderness    Result Review    Result Review:  I have personally reviewed these results:  [x]  Laboratory      Lab 02/17/24  0704 02/17/24  0230 02/16/24  1758 02/16/24  1132 02/16/24  0746   WBC  --  9.71  --   --  5.34   HEMOGLOBIN  --  10.0*  --   --  10.9*   HEMATOCRIT  --  32.1*  --   --  34.2*   PLATELETS  --  102*  --   --  102*   NEUTROS ABS  --  6.66  --   --  4.12   IMMATURE GRANS (ABS)  --   --   --   --  0.03   LYMPHS ABS  --   --   --   --  0.80   MONOS ABS  --   --   --   --  0.37   EOS ABS  --   --   --   --  0.01   MCV  --  96.1  --   --  94.7   CRP  --   --   --   --  8.90*   PROCALCITONIN  --   --   --   --  1.90*   LACTATE 2.3* 3.7* 4.4*   < > 3.4*    < > = values in this interval not displayed.         Lab 02/17/24  0230 02/16/24  0746   SODIUM 136 135*   POTASSIUM 3.7 3.1*   CHLORIDE 99 95*   CO2 19.6* 24.2   ANION GAP 17.4* 15.8*   BUN 44* 38*   CREATININE 6.62* 7.22*   EGFR 8.0* 7.2*   GLUCOSE 162* 96   CALCIUM 7.7* 8.3*   MAGNESIUM 1.1*  --    PHOSPHORUS 5.3*  --    TSH  --  7.230*         Lab 02/16/24  0746    TOTAL PROTEIN 5.7*   ALBUMIN 3.0*   GLOBULIN 2.7   ALT (SGPT) 9   AST (SGOT) 15   BILIRUBIN 0.6   ALK PHOS 60         Lab 02/16/24  0746   PROBNP 2,200.0*                 Brief Urine Lab Results  (Last result in the past 365 days)        Color   Clarity   Blood   Leuk Est   Nitrite   Protein   CREAT   Urine HCG        02/16/24 1127 Dark Yellow   Clear   Negative   Negative   Negative   100 mg/dL (2+)                 [x]  Microbiology   Microbiology Results (last 10 days)       Procedure Component Value - Date/Time    Clostridioides difficile Toxin - Stool, Per Rectum [[id number]]  (Abnormal) Collected: 02/16/24 2010    Lab Status: Final result Specimen: Stool from Per Rectum Updated: 02/16/24 2138    Narrative:      The following orders were created for panel order Clostridioides difficile Toxin - Stool, Per Rectum.  Procedure                               Abnormality         Status                     ---------                               -----------         ------                     Clostridioides difficile...[[id number]]  Abnormal            Final result                 Please view results for these tests on the individual orders.    Clostridioides difficile Toxin, PCR - Stool, Per Rectum [[id number]]  (Abnormal) Collected: 02/16/24 2010    Lab Status: Final result Specimen: Stool from Per Rectum Updated: 02/16/24 2138     Toxigenic C. difficile by PCR Positive     027 Toxin Presumptive Negative    Narrative:      DNA from a toxigenic strain of C.difficile has been detected. Antigen testing for the presence of free C.difficile toxin is currently in progress, to help determine the clinical significance of this PCR result.     Clostridioides difficile toxin Ag, Reflex - Stool, Per Rectum [[id number]]  (Normal) Collected: 02/16/24 2010    Lab Status: Final result Specimen: Stool from Per Rectum Updated: 02/16/24 2139     C.diff Toxin Ag Negative    Narrative:      DNA from a toxigenic strain of C.difficile was  detected, although the free toxin itself was not detected. These findings are consistent with C.difficile colonization and may not reflect actual C.difficile infection. Clinical correlation needed.    MRSA Screen, PCR (Inpatient) - Swab, Nares [[id number]]  (Normal) Collected: 02/16/24 1447    Lab Status: Final result Specimen: Swab from Nares Updated: 02/16/24 1635     MRSA PCR No MRSA Detected    Narrative:      The negative predictive value of this diagnostic test is high and should only be used to consider de-escalating anti-MRSA therapy. A positive result may indicate colonization with MRSA and must be correlated clinically.    Respiratory Panel PCR w/COVID-19(SARS-CoV-2) RA/TANIA/GAVIN/PAD/COR/NENA In-House, NP Swab in UTM/VTM, 2 HR TAT - Swab, Nasopharynx [[id number]]  (Normal) Collected: 02/16/24 1447    Lab Status: Final result Specimen: Swab from Nasopharynx Updated: 02/16/24 1622     ADENOVIRUS, PCR Not Detected     Coronavirus 229E Not Detected     Coronavirus HKU1 Not Detected     Coronavirus NL63 Not Detected     Coronavirus OC43 Not Detected     COVID19 Not Detected     Human Metapneumovirus Not Detected     Human Rhinovirus/Enterovirus Not Detected     Influenza A PCR Not Detected     Influenza B PCR Not Detected     Parainfluenza Virus 1 Not Detected     Parainfluenza Virus 2 Not Detected     Parainfluenza Virus 3 Not Detected     Parainfluenza Virus 4 Not Detected     RSV, PCR Not Detected     Bordetella pertussis pcr Not Detected     Bordetella parapertussis PCR Not Detected     Chlamydophila pneumoniae PCR Not Detected     Mycoplasma pneumo by PCR Not Detected    Narrative:      In the setting of a positive respiratory panel with a viral infection PLUS a negative procalcitonin without other underlying concern for bacterial infection, consider observing off antibiotics or discontinuation of antibiotics and continue supportive care. If the respiratory panel is positive for atypical bacterial infection  (Bordetella pertussis, Chlamydophila pneumoniae, or Mycoplasma pneumoniae), consider antibiotic de-escalation to target atypical bacterial infection.    Legionella Antigen, Urine - Urine, Urine, Clean Catch [[id number]]  (Normal) Collected: 02/16/24 1127    Lab Status: Final result Specimen: Urine, Clean Catch Updated: 02/16/24 1322     LEGIONELLA ANTIGEN, URINE Negative    S. Pneumo Ag Urine or CSF - Urine, Urine, Clean Catch [[id number]]  (Normal) Collected: 02/16/24 1127    Lab Status: Final result Specimen: Urine, Clean Catch Updated: 02/16/24 1322     Strep Pneumo Ag Negative    Body Fluid Culture - Body Fluid, Peritoneum [[id number]] Collected: 02/16/24 1030    Lab Status: Preliminary result Specimen: Body Fluid from Peritoneum Updated: 02/16/24 1151     Gram Stain No WBCs seen      No organisms seen    Blood Culture - Blood, Arm, Left [[id number]]  (Abnormal) Collected: 02/16/24 0937    Lab Status: Preliminary result Specimen: Blood from Arm, Left Updated: 02/16/24 2143     Blood Culture Abnormal Stain     Gram Stain Anaerobic Bottle Gram negative bacilli      Aerobic Bottle Gram negative bacilli    COVID-19, FLU A/B, RSV PCR 1 HR TAT - Swab, Nasopharynx [[id number]]  (Normal) Collected: 02/16/24 0753    Lab Status: Final result Specimen: Swab from Nasopharynx Updated: 02/16/24 0833     COVID19 Not Detected     Influenza A PCR Not Detected     Influenza B PCR Not Detected     RSV, PCR Not Detected    Narrative:      Fact sheet for providers: https://www.fda.gov/media/136079/download    Fact sheet for patients: https://www.fda.gov/media/551895/download    Test performed by PCR.    Blood Culture - Blood, Arm, Right [[id number]]  (Abnormal) Collected: 02/16/24 0746    Lab Status: Preliminary result Specimen: Blood from Arm, Right Updated: 02/16/24 2026     Blood Culture Abnormal Stain     Gram Stain Aerobic Bottle Gram negative bacilli      Anaerobic Bottle Gram negative bacilli    Blood Culture ID, PCR - Blood, Arm,  Right [[id number]]  (Abnormal) Collected: 02/16/24 0746    Lab Status: Final result Specimen: Blood from Arm, Right Updated: 02/16/24 2146     BCID, PCR Escherichia coli. Identification by BCID2 PCR.     BOTTLE TYPE Aerobic Bottle    Narrative:      No resistance genes detected.          [x]  Radiology  CT Abdomen Pelvis Without Contrast    Result Date: 2/16/2024    1. Extensive right lower lobe pneumonia. 2. Pneumoperitoneum similar to prior chest CT likely related to peritoneal dialysis catheter.  No organized fluid collection or abscess. 3. Nonobstructing 11 mm calculus at the right renal pelvis unchanged. 4. Additional chronic findings above.     LISY SANTILLAN MD       Electronically Signed and Approved By: LISY SANTILLAN MD on 2/16/2024 at 14:46             XR Chest 1 View    Result Date: 2/16/2024   Right lower lobe airspace opacity consistent with pneumonia.  Pneumoperitoneum, better seen on same day CT of the abdomen/pelvis.       JOSH HAMPTON MD       Electronically Signed and Approved By: JOSH HAMPTON MD on 2/16/2024 at 14:30             XR Chest 1 View    Result Date: 2/16/2024    No acute process.       LISY SANTILLAN MD       Electronically Signed and Approved By: LISY SANTILLAN MD on 2/16/2024 at 8:07            []  EKG/Telemetry   []  Cardiology/Vascular   []  Pathology  []  Old records  []  Other:    Assessment & Plan   Assessment / Plan     Assessment/Plan:   Severe sepsis with shock (with findings of lactic acidosis and hypotension)  Acute peritonitis ruled out  E. Coli bacteremia  Right lower lobe pneumonia  Possible C. difficile colitis  Acute anion gap metabolic acidosis most likely secondary to lactic acidosis  End-stage renal disease on peritoneal dialysis  Hypokalemia        Admit to the hospitalist service to ICU for further management.  Pulmonary critical care consulted.  Consult greatly appreciated continue broad-spectrum antibiotic with Zosyn and vancomycin.  2 of 2 blood cultures positive for E.  coli.    Continue Levophed.  Defer further IV fluids resuscitative measures given development hypoxia post initial resuscitative measures.    Midodrine added on today per critical care note  Oral vancomycin added on today  Supplemental O2 as needed.  Patient is currently on room air  Nephrology consulted for dialysis recommendations.  Continue peritoneal dialysis  Replace potassium        DVT prophylaxis:  Medical and mechanical DVT prophylaxis orders are present.        CODE STATUS:        Electronically signed by Garland Veronica MD, 2/17/2024, 09:49 EST.

## 2024-02-17 NOTE — PROGRESS NOTES
"[facility] Clinical Pharmacy Services: Vancomycin Pharmacokinetic Initial Consult Note    Nelson Wang is a 79 y.o. male who is on day 2 of pharmacy to dose vancomycin for  empiric coverage .    Consult Information  Consulting Provider: Jeremías  Planned Duration of Therapy: 3 days (through ) or TBD  Was Patient Receiving Prior to Admission/Consult?: No  Loading Dose Ordered or Given: 1500 mg on  at 1034  PK/PD Target: Dose by Levels  Other Antimicrobials: Piperacillin/Tazobactam    Imaging Reviewed?: Yes    Microbiology Data  MRSA PCR performed:  24 ; Result: Negative  Culture/Source:    Blood- E coli   C diff toxin PCR- Positive    Resp panel- Negative       Vitals/Labs  Ht: 167.6 cm (66\"); Wt: 76.2 kg (167 lb 15.9 oz)  Temp (24hrs), Av.2 °F (37.9 °C), Min:98.8 °F (37.1 °C), Max:101.4 °F (38.6 °C)   Estimated Creatinine Clearance: 9.9 mL/min (A) (by C-G formula based on SCr of 6.62 mg/dL (H)).  Peritoneal Dialysis     Results from last 7 days   Lab Units 24  0230 24  0746   VANCOMYCIN RM mcg/mL 19.70  --    CREATININE mg/dL 6.62* 7.22*   WBC 10*3/mm3 9.71 5.34     Assessment/Plan:  Vancomycin level this AM reported as 19.70.  Will provide vancomycin 500 mg once one and order follow up level tomorrow AM.    Vanc Random ordered for  at AM labs  Patient has order for Basic Metabolic Panel    Pharmacy will follow patient's kidney function and will adjust doses and obtain levels as necessary. Thank you for involving pharmacy in this patient's care. Please contact pharmacy with any questions or concerns.                           Arnie Gonzalez  Clinical Pharmacist  "

## 2024-02-17 NOTE — PLAN OF CARE
"Goal Outcome Evaluation:      Levophed was able to titrated down throughout the night. Patient remains alert and oriented and very concerned about his septic status. Patient states \" he is feeling much better\" Patient remained afebrile and is getting tylenol every 4 hours by request. Patient tolerated PD session at midnight very well. 1500 ml was instilled instead of the previous 2000. Daughter remained at bedside with patient until 2300 and was attentive to him and his needs.     Patient is having frequent loose/liquid green BM's. C-diff toxin is negative. Patient was started on Pepto.     Patient is currently on room air but still is not able to tolerate laying flat.     PD will be done again at 0600.     Continue with current plan of care.                                         "

## 2024-02-17 NOTE — PLAN OF CARE
Goal Outcome Evaluation:      PATIENT DID NOT WEAR THE BIPAP. WAS ABLE TO WEAN HIM TO ROOM AIR.

## 2024-02-17 NOTE — PROGRESS NOTES
"Nephrology progress note    Galileo Cook MD     Current history: Patient is alert, currently still having a lot of diarrhea, denies any chest pain shortness of breath no nausea or vomiting currently    Current medication list:  bacitracin, 1 Application, Topical, Q12H  dianeal lo-peter 1.5%, 1,500 mL, Intraperitoneal, 4 Exchanges Daily - Dwell Overnight  heparin (porcine), 5,000 Units, Subcutaneous, Q12H  midodrine, 5 mg, Oral, TID AC  piperacillin-tazobactam, 4.5 g, Intravenous, Q12H  sodium chloride, 10 mL, Intravenous, Q12H  vancomycin, 125 mg, Oral, Q6H         acetaminophen **OR** acetaminophen **OR** acetaminophen    melatonin    ondansetron    Pharmacy to Dose Zosyn    sodium chloride    sodium chloride     Physical Exam:  /100   Pulse 120   Temp 98.8 °F (37.1 °C) (Oral)   Resp 20   Ht 167.6 cm (66\")   Wt 76.2 kg (167 lb 15.9 oz)   SpO2 (!) 89%   BMI 27.11 kg/m²     Intake/Output Summary (Last 24 hours) at 2/17/2024 1046  Last data filed at 2/17/2024 0726  Gross per 24 hour   Intake 4000 ml   Output 2711 ml   Net 1289 ml      General: Patient alert and oriented no acute distress, sitting on side of bed  Cardiac: Regular rate and rhythm without a rub no S3 or S4  Lungs: Clear bilaterally no wheezes rhonchi or rales  Abdomen: Bowel sounds positive nontender soft no mass felt no apparent organomegaly noted, Tenckhoff catheter without drainage  Extremities: No edema  or cyanosis  Skin: No acute rashes noted  : Deferred  Neuro: Appears intact with motor and sensory grossly    Results from last 7 days   Lab Units 02/17/24  0230 02/16/24  0746   SODIUM mmol/L 136 135*   POTASSIUM mmol/L 3.7 3.1*   CHLORIDE mmol/L 99 95*   CO2 mmol/L 19.6* 24.2   BUN mg/dL 44* 38*   CREATININE mg/dL 6.62* 7.22*   CALCIUM mg/dL 7.7* 8.3*   BILIRUBIN mg/dL  --  0.6   ALK PHOS U/L  --  60   ALT (SGPT) U/L  --  9   AST (SGOT) U/L  --  15   GLUCOSE mg/dL 162* 96       Estimated Creatinine Clearance: 9.9 mL/min (A) (by " C-G formula based on SCr of 6.62 mg/dL (H)).    Results from last 7 days   Lab Units 02/17/24  0230   MAGNESIUM mg/dL 1.1*   PHOSPHORUS mg/dL 5.3*             Results from last 7 days   Lab Units 02/17/24  0230 02/16/24  0746   WBC 10*3/mm3 9.71 5.34   HEMOGLOBIN g/dL 10.0* 10.9*   PLATELETS 10*3/mm3 102* 102*             Imaging Results (Last 24 Hours)       Procedure Component Value Units Date/Time    XR Chest 1 View [[id number]] Collected: 02/16/24 1713     Updated: 02/16/24 1716    Narrative:      PROCEDURE: XR CHEST 1 VW     COMPARISON: [facility], CT, CT ABDOMEN PELVIS WO CONTRAST, 2/16/2024, 14:16.  [facility], CR, XR CHEST 1 VW, 2/16/2024, 13:50.     INDICATIONS: central line placement     FINDINGS:      New right IJ central line with the tip in the region of the distal SVC.  No evidence of   pneumothorax.  The heart and pulmonary vasculature are within normal limits.  There is right lower   lobe airspace consolidation consistent with pneumonia.      IMPRESSION:  1. New right IJ central line in good position.  No pneumothorax.     2. Right lower lobe airspace consolidation consistent with pneumonia.       EPIFANIO RENTERIA MD         Electronically Signed and Approved By: EPIFANIO RENTERIA MD on 2/16/2024 at 17:12                     CT Abdomen Pelvis Without Contrast [[id number]] Collected: 02/16/24 1447     Updated: 02/16/24 1450    Narrative:      PROCEDURE: CT ABDOMEN PELVIS WO CONTRAST     COMPARISON: [facility], CR, XR CHEST 1 VW, 2/16/2024, 13:50.  [facility], CR, XR CHEST PA AND LATERAL, 9/19/2023, 10:40.  [facility], CR, XR   CHEST 1 VW, 2/16/2024, 7:55.  [facility], CT, CT CHEST WO CONTRAST   DIAGNOSTIC, 12/06/2023, 11:11.     INDICATIONS: peritonitis, persistent fever     TECHNIQUE: CT images were created without intravenous contrast.       PROTOCOL:   Standard imaging protocol performed       RADIATION:   DLP: 268mGy*cm    Automated exposure control was utilized to minimize radiation dose.      FINDINGS:   Extensive right lower lobe pneumonia.  Left lung base is clear.  Coronary artery calcifications   present.     Lack of contrast limits assessment of abdominal organs and vasculature.  The liver is normal in   size and contour.  There is elevation of the right hemidiaphragm.  Spleen and adrenal glands are   unremarkable.  Pancreas without findings of pancreatitis.  Gallbladder present.  No pericholecystic   inflammation.     Several low-attenuation bilateral renal lesions likely cysts, similar to the prior study,   incompletely assessed without contrast.  There is a chronic calculus at the right renal pelvis   which measures 1.1 cm x 0.8 cm, unchanged.  Negative for hydronephrosis or hydroureter.  No   obstructing distal ureteral calculus.  Urinary bladder is collapsed.     No bowel obstruction.  Colonic diverticulosis without diverticulitis.  Pneumoperitoneum in the   abdomen similar to prior chest CT likely related to patient's peritoneal dialysis catheter which is   located in the pelvis.  No fluid collection surrounding the dialysis catheter.  Stomach and   proximal small bowel are normally configured.  No findings of appendicitis.  Extensive vascular   calcifications of the abdominal aorta and branch vasculature without aneurysm.  Chronic height loss   at the superior endplate of L3 with partial ankylosis at the L2-3 level.  Chronic retropulsion of   the posterior aspect of the mid L3 vertebral body to the canal contribute to suspected moderate   canal stenosis at L3.  No aggressive osseous lesion or acute fracture.  Right hip prosthesis   partially imaged.       Impression:         1. Extensive right lower lobe pneumonia.  2. Pneumoperitoneum similar to prior chest CT likely related to peritoneal dialysis catheter.  No   organized fluid collection or abscess.  3. Nonobstructing 11 mm calculus  at the right renal pelvis unchanged.  4. Additional chronic findings above.            LISY SANTILLAN MD         Electronically Signed and Approved By: LISY SANTILLAN MD on 2/16/2024 at 14:46                     XR Chest 1 View [[id number]] Collected: 02/16/24 1430     Updated: 02/16/24 1433    Narrative:      PROCEDURE: XR CHEST 1 VW     COMPARISON: [facility], CT, CT ABDOMEN PELVIS WO CONTRAST, 2/16/2024, 14:16.  [facility], CR, XR CHEST 1 VW, 2/16/2024, 7:55.     INDICATIONS: worsining hypoxia     FINDINGS:   Unchanged cardiomediastinal silhouette.  Low lung volumes.  Right lower lung airspace opacity.  The   no large pleural effusion or visible pneumothorax.  Pneumoperitoneum.  No acute osseous   abnormality.  Probable healed left-sided rib fractures.       Impression:       Right lower lobe airspace opacity consistent with pneumonia.  Pneumoperitoneum, better   seen on same day CT of the abdomen/pelvis.                  JOSH HAMPTON MD         Electronically Signed and Approved By: JOSH HAMPTON MD on 2/16/2024 at 14:30                              Patient Active Problem List   Diagnosis    Essential hypertension    Bradycardia, sinus    Anemia due to chronic kidney disease    Hypothyroidism    Idiopathic gout    Proteinuria    Psoriasis    Thrombocytopenia    Type 2 diabetes mellitus without complication    CKD (chronic kidney disease), stage IV    Hyperlipidemia    Monoclonal gammopathy of unknown significance (MGUS)    Stage 5 chronic kidney disease    Peritoneal dialysis catheter in place    Chronic gout without tophus    Peritoneal dialysis catheter dysfunction    Medicare annual wellness visit, subsequent    Chronic cough    Peritonitis       Assessment/plan:    1.  ESRD-  On PD at home.  Changed to 1-1/2 L 4 exchanges a day,  volume status stable, continue for now.  PD fluid negative for acute infection    2.  Hypotension/sepsis-    CT with evidence of RLL PNA.  Diarrhea as well  treatment per primary and/or critical care    3.  Dyspnea-  flu/covid/rsv negative.  Seems improved today     4.  Anemia of CKD-slightly decreased at 10.0 but overall stable     5.  Hypokalemia-  improved after replacement yesterday, magnesium very low but currently is being replaced.  Will check in a.m.      6.  Metabolic acidosis: Probably combination of lactic acidosis as well as renal failure, will start on oral bicarbonate if not already on        Galileo Cook MD

## 2024-02-17 NOTE — PLAN OF CARE
Goal Outcome Evaluation:  Plan of Care Reviewed With: patient      DYSPHAGIA CRITERIA: Swallow appears grossly functional for nutritional needs.  No overt clinical signs or symptoms of aspiration were noted at the bedside.  Note silent aspiration cannot be ruled out at the bedside.    FUNCTIONAL ASSESSMENT INSTRUMENT: Patient currently scored a level 7 of 7 on Functional Communication Measures for swallowing indicating a 0% limitation in function.    ASSESSMENT/ PLAN OF CARE:  No direct speech therapy is recommended at this time. Recommend rereferral should patient demonstrate change in status.    RECOMMENDATIONS:   1.   DIET: Regular solids cut small, thin liquid.    2.  POSITION: Positioning fully upright for all p.o. intake and 30 minutes following.    3.  COMPENSATORY STRATEGIES: Alternate small bites and small sips of solids and liquids at a slow rate.    4.  If aspiration continues to be suspected, would recommend Modified Barium Swallow Study to rule out aspiration.

## 2024-02-18 ENCOUNTER — APPOINTMENT (OUTPATIENT)
Dept: CT IMAGING | Facility: HOSPITAL | Age: 79
DRG: 871 | End: 2024-02-18
Payer: MEDICARE

## 2024-02-18 LAB
ALBUMIN SERPL-MCNC: 2.6 G/DL (ref 3.5–5.2)
ALBUMIN/GLOB SERPL: 0.9 G/DL
ALP SERPL-CCNC: 50 U/L (ref 39–117)
ALT SERPL W P-5'-P-CCNC: 13 U/L (ref 1–41)
ANION GAP SERPL CALCULATED.3IONS-SCNC: 15.4 MMOL/L (ref 5–15)
ANISOCYTOSIS BLD QL: ABNORMAL
AST SERPL-CCNC: 30 U/L (ref 1–40)
BACTERIA SPEC AEROBE CULT: ABNORMAL
BACTERIA SPEC AEROBE CULT: ABNORMAL
BILIRUB SERPL-MCNC: 0.5 MG/DL (ref 0–1.2)
BUN SERPL-MCNC: 43 MG/DL (ref 8–23)
BUN/CREAT SERPL: 7.4 (ref 7–25)
CALCIUM SPEC-SCNC: 8.6 MG/DL (ref 8.6–10.5)
CHLORIDE SERPL-SCNC: 97 MMOL/L (ref 98–107)
CO2 SERPL-SCNC: 22.6 MMOL/L (ref 22–29)
CREAT SERPL-MCNC: 5.85 MG/DL (ref 0.76–1.27)
CRP SERPL-MCNC: 49.54 MG/DL (ref 0–0.5)
D-LACTATE SERPL-SCNC: 1.7 MMOL/L (ref 0.5–2)
D-LACTATE SERPL-SCNC: 1.9 MMOL/L (ref 0.5–2)
DEPRECATED RDW RBC AUTO: 50.5 FL (ref 37–54)
DOHLE BOD BLD QL SMEAR: PRESENT
EGFRCR SERPLBLD CKD-EPI 2021: 9.2 ML/MIN/1.73
EOSINOPHIL # BLD MANUAL: 0.35 10*3/MM3 (ref 0–0.4)
EOSINOPHIL NFR BLD MANUAL: 3 % (ref 0.3–6.2)
ERYTHROCYTE [DISTWIDTH] IN BLOOD BY AUTOMATED COUNT: 14.9 % (ref 12.3–15.4)
GLOBULIN UR ELPH-MCNC: 2.9 GM/DL
GLUCOSE SERPL-MCNC: 78 MG/DL (ref 65–99)
GRAM STN SPEC: ABNORMAL
HCT VFR BLD AUTO: 29.9 % (ref 37.5–51)
HGB BLD-MCNC: 9.7 G/DL (ref 13–17.7)
ISOLATED FROM: ABNORMAL
ISOLATED FROM: ABNORMAL
LYMPHOCYTES # BLD MANUAL: 1.5 10*3/MM3 (ref 0.7–3.1)
LYMPHOCYTES NFR BLD MANUAL: 6 % (ref 5–12)
MAGNESIUM SERPL-MCNC: 3 MG/DL (ref 1.6–2.4)
MCH RBC QN AUTO: 30.3 PG (ref 26.6–33)
MCHC RBC AUTO-ENTMCNC: 32.4 G/DL (ref 31.5–35.7)
MCV RBC AUTO: 93.4 FL (ref 79–97)
MONOCYTES # BLD: 0.69 10*3/MM3 (ref 0.1–0.9)
NEUTROPHILS # BLD AUTO: 8.97 10*3/MM3 (ref 1.7–7)
NEUTROPHILS NFR BLD MANUAL: 72 % (ref 42.7–76)
NEUTS BAND NFR BLD MANUAL: 6 % (ref 0–5)
OVALOCYTES BLD QL SMEAR: ABNORMAL
PHOSPHATE SERPL-MCNC: 3.7 MG/DL (ref 2.5–4.5)
PLATELET # BLD AUTO: 75 10*3/MM3 (ref 140–450)
PMV BLD AUTO: 11.7 FL (ref 6–12)
POTASSIUM SERPL-SCNC: 3.2 MMOL/L (ref 3.5–5.2)
PROT SERPL-MCNC: 5.5 G/DL (ref 6–8.5)
RBC # BLD AUTO: 3.2 10*6/MM3 (ref 4.14–5.8)
SMALL PLATELETS BLD QL SMEAR: ABNORMAL
SODIUM SERPL-SCNC: 135 MMOL/L (ref 136–145)
VANCOMYCIN SERPL-MCNC: 15.92 MCG/ML (ref 5–40)
VARIANT LYMPHS NFR BLD MANUAL: 13 % (ref 19.6–45.3)
WBC NRBC COR # BLD AUTO: 11.5 10*3/MM3 (ref 3.4–10.8)

## 2024-02-18 PROCEDURE — 80202 ASSAY OF VANCOMYCIN: CPT | Performed by: INTERNAL MEDICINE

## 2024-02-18 PROCEDURE — 80053 COMPREHEN METABOLIC PANEL: CPT | Performed by: INTERNAL MEDICINE

## 2024-02-18 PROCEDURE — 83605 ASSAY OF LACTIC ACID: CPT | Performed by: NURSE PRACTITIONER

## 2024-02-18 PROCEDURE — 86140 C-REACTIVE PROTEIN: CPT | Performed by: NURSE PRACTITIONER

## 2024-02-18 PROCEDURE — 87040 BLOOD CULTURE FOR BACTERIA: CPT | Performed by: INTERNAL MEDICINE

## 2024-02-18 PROCEDURE — 25010000002 HEPARIN (PORCINE) PER 1000 UNITS: Performed by: INTERNAL MEDICINE

## 2024-02-18 PROCEDURE — 85025 COMPLETE CBC W/AUTO DIFF WBC: CPT | Performed by: INTERNAL MEDICINE

## 2024-02-18 PROCEDURE — 99232 SBSQ HOSP IP/OBS MODERATE 35: CPT | Performed by: INTERNAL MEDICINE

## 2024-02-18 PROCEDURE — 85007 BL SMEAR W/DIFF WBC COUNT: CPT | Performed by: INTERNAL MEDICINE

## 2024-02-18 PROCEDURE — 99291 CRITICAL CARE FIRST HOUR: CPT | Performed by: INTERNAL MEDICINE

## 2024-02-18 PROCEDURE — 71250 CT THORAX DX C-: CPT

## 2024-02-18 PROCEDURE — 83735 ASSAY OF MAGNESIUM: CPT | Performed by: INTERNAL MEDICINE

## 2024-02-18 PROCEDURE — 25010000002 CEFTRIAXONE PER 250 MG: Performed by: INTERNAL MEDICINE

## 2024-02-18 PROCEDURE — 25010000002 PIPERACILLIN SOD-TAZOBACTAM PER 1 G: Performed by: NURSE PRACTITIONER

## 2024-02-18 PROCEDURE — 84100 ASSAY OF PHOSPHORUS: CPT | Performed by: INTERNAL MEDICINE

## 2024-02-18 RX ORDER — B.COAGUL,SUBTILIS/INULIN/VIT C 1B CELL-1G
1 TABLET,CHEWABLE ORAL 2 TIMES DAILY
Status: DISCONTINUED | OUTPATIENT
Start: 2024-02-18 | End: 2024-02-20

## 2024-02-18 RX ORDER — POTASSIUM CHLORIDE 750 MG/1
20 CAPSULE, EXTENDED RELEASE ORAL ONCE
Status: COMPLETED | OUTPATIENT
Start: 2024-02-18 | End: 2024-02-18

## 2024-02-18 RX ORDER — POTASSIUM CHLORIDE 750 MG/1
20 CAPSULE, EXTENDED RELEASE ORAL EVERY 4 HOURS
Status: COMPLETED | OUTPATIENT
Start: 2024-02-18 | End: 2024-02-18

## 2024-02-18 RX ORDER — SERTRALINE HYDROCHLORIDE 25 MG/1
100 TABLET, FILM COATED ORAL NIGHTLY
Status: DISCONTINUED | OUTPATIENT
Start: 2024-02-18 | End: 2024-02-24 | Stop reason: HOSPADM

## 2024-02-18 RX ADMIN — BACITRACIN 0.9 G: 500 OINTMENT TOPICAL at 08:28

## 2024-02-18 RX ADMIN — BACITRACIN 0.9 G: 500 OINTMENT TOPICAL at 21:17

## 2024-02-18 RX ADMIN — MIDODRINE HYDROCHLORIDE 10 MG: 5 TABLET ORAL at 17:12

## 2024-02-18 RX ADMIN — Medication 1 DOSE: at 21:18

## 2024-02-18 RX ADMIN — MIDODRINE HYDROCHLORIDE 10 MG: 5 TABLET ORAL at 11:25

## 2024-02-18 RX ADMIN — SODIUM CHLORIDE, SODIUM LACTATE, CALCIUM CHLORIDE, MAGNESIUM CHLORIDE AND DEXTROSE 1500 ML: 1.5; 538; 448; 18.3; 5.08 INJECTION, SOLUTION INTRAPERITONEAL at 13:42

## 2024-02-18 RX ADMIN — Medication 10 ML: at 21:18

## 2024-02-18 RX ADMIN — VANCOMYCIN HYDROCHLORIDE 125 MG: 125 CAPSULE ORAL at 05:28

## 2024-02-18 RX ADMIN — Medication 10 ML: at 08:27

## 2024-02-18 RX ADMIN — HEPARIN SODIUM 5000 UNITS: 5000 INJECTION INTRAVENOUS; SUBCUTANEOUS at 08:27

## 2024-02-18 RX ADMIN — SODIUM BICARBONATE 650 MG TABLET 1300 MG: at 21:17

## 2024-02-18 RX ADMIN — SODIUM CHLORIDE, SODIUM LACTATE, CALCIUM CHLORIDE, MAGNESIUM CHLORIDE AND DEXTROSE 1500 ML: 1.5; 538; 448; 18.3; 5.08 INJECTION, SOLUTION INTRAPERITONEAL at 22:57

## 2024-02-18 RX ADMIN — SODIUM BICARBONATE 650 MG TABLET 1300 MG: at 08:27

## 2024-02-18 RX ADMIN — POTASSIUM CHLORIDE 20 MEQ: 10 CAPSULE, COATED, EXTENDED RELEASE ORAL at 21:17

## 2024-02-18 RX ADMIN — SERTRALINE HYDROCHLORIDE 100 MG: 100 TABLET ORAL at 23:48

## 2024-02-18 RX ADMIN — VANCOMYCIN HYDROCHLORIDE 125 MG: 125 CAPSULE ORAL at 17:16

## 2024-02-18 RX ADMIN — VANCOMYCIN HYDROCHLORIDE 125 MG: 125 CAPSULE ORAL at 11:27

## 2024-02-18 RX ADMIN — SODIUM CHLORIDE, SODIUM LACTATE, CALCIUM CHLORIDE, MAGNESIUM CHLORIDE AND DEXTROSE 1500 ML: 1.5; 538; 448; 18.3; 5.08 INJECTION, SOLUTION INTRAPERITONEAL at 08:28

## 2024-02-18 RX ADMIN — PIPERACILLIN AND TAZOBACTAM 4.5 G: 4; .5 INJECTION, POWDER, FOR SOLUTION INTRAVENOUS at 10:32

## 2024-02-18 RX ADMIN — HEPARIN SODIUM 5000 UNITS: 5000 INJECTION INTRAVENOUS; SUBCUTANEOUS at 21:16

## 2024-02-18 RX ADMIN — CEFTRIAXONE SODIUM 1000 MG: 1 INJECTION, POWDER, FOR SOLUTION INTRAMUSCULAR; INTRAVENOUS at 14:12

## 2024-02-18 RX ADMIN — MIDODRINE HYDROCHLORIDE 10 MG: 5 TABLET ORAL at 07:52

## 2024-02-18 RX ADMIN — VANCOMYCIN HYDROCHLORIDE 125 MG: 125 CAPSULE ORAL at 23:48

## 2024-02-18 RX ADMIN — POTASSIUM CHLORIDE 20 MEQ: 10 CAPSULE, COATED, EXTENDED RELEASE ORAL at 13:41

## 2024-02-18 RX ADMIN — POTASSIUM CHLORIDE 20 MEQ: 10 CAPSULE, COATED, EXTENDED RELEASE ORAL at 17:16

## 2024-02-18 NOTE — PROGRESS NOTES
[facility]   Hospitalist Progress Note  Date: 2024  Patient Name: Nelson Wang  : 1945  MRN: [id number]  Date of admission: 2024  Room/Bed: [facility]/1      Subjective   Subjective     Chief Complaint: I had a fever and was weak    Summary:Nelson Wang is a 79 y.o. male with past medical history significant for stage renal disease on peritoneal dialysis, hypertension, diabetes mellitus type 2 hypertension as well as psoriasis reports that on yesterday he began having generalized weakness as well as body aches and fever up to 104.  Despite Tylenol reports having recurrent fevers and subsequently presented to the emergency department on this morning for further evaluation.  Patient does report having a nonproductive cough he denies any shortness of breath or chest pain.  Denies any abdominal pain no nausea or vomiting.  He does report having had episodes of diarrhea.      In the emergency department patient presentation temperature is noted to be 101, pulse 93, respirations 16 with blood pressure 128/69 pulse ox 94% on room air.  Repeat blood pressure while in the ED did drop to as low as 68/51.  CBC showed WBC of 5.3 hemoglobin Mattock are 10.9 and 34.2 respectively, platelets 102 neutrophils of 77%.  CMP showed BUN of 38 creatinine 7.22, sodium 135, potassium 3.1, chloride 95, calcium 8.3, total protein 5.7, albumin of 3.0, anion gap of 15.8.  Serum lactate is 3.4 with a repeat of 3.4.  Fluvid/RSV is negative, initial chest x-ray shows no acute process.   Repeat chest x-ray shows right lower lobe airspace opacity.  In the emergency department the patient received IV fluid bolus via sepsis protocol.   Despite this he did require initiation of Levophed however.  He also received Zosyn and vancomycin.  Hospitalist service was consulted for admission for patient with sepsis.    Interval Followup: Patient seen and evaluated on this morning.  He reports that he is continuing to feel better.  He  reports that his breathing is much improved.  He voices no complaints at the time of my evaluation.  Patient has been weaned off of Levophed overnight.    Review of Systems    All systems reviewed and negative except for what is outlined above.      Objective   Objective     Vitals:   Temp:  [98.1 °F (36.7 °C)-99.1 °F (37.3 °C)] 98.2 °F (36.8 °C)  Heart Rate:  [] 72  BP: ()/(44-88) 137/53    Physical Exam   General: Awake, alert, sitting up on the side of the bed NAD  HENT: NCAT, MMM  Eyes: pupils equal, no scleral icterus  Cardiovascular: Regular S1-S2, no appreciable murmurs   Pulmonary: Auscultation bilaterally, no wheezes; no conversational dyspnea  Gastrointestinal: S/ND/NT, +BS  Musculoskeletal: No gross deformities  Skin: No jaundice  Neuro: CN II through XII grossly intact; speech clear; no tremor  Psych: Mood and affect appropriate  : No Kolb catheter; no suprapubic tenderness    Result Review    Result Review:  I have personally reviewed these results:  [x]  Laboratory      Lab 02/18/24  0528 02/17/24  2328 02/17/24  1827 02/17/24  0704 02/17/24  0230 02/16/24  1132 02/16/24  0746   WBC 11.50*  --   --   --  9.71  --  5.34   HEMOGLOBIN 9.7*  --   --   --  10.0*  --  10.9*   HEMATOCRIT 29.9*  --   --   --  32.1*  --  34.2*   PLATELETS 75*  --   --   --  102*  --  102*   NEUTROS ABS 8.97*  --   --   --  6.66  --  4.12   IMMATURE GRANS (ABS)  --   --   --   --   --   --  0.03   LYMPHS ABS  --   --   --   --   --   --  0.80   MONOS ABS  --   --   --   --   --   --  0.37   EOS ABS 0.35  --   --   --   --   --  0.01   MCV 93.4  --   --   --  96.1  --  94.7   CRP 49.54*  --   --   --   --   --  8.90*   PROCALCITONIN  --   --   --   --   --   --  1.90*   LACTATE 1.7 2.0 2.3*   < > 3.7*   < > 3.4*    < > = values in this interval not displayed.         Lab 02/18/24  0528 02/17/24  0230 02/16/24  0746   SODIUM 135* 136 135*   POTASSIUM 3.2* 3.7 3.1*   CHLORIDE 97* 99 95*   CO2 22.6 19.6* 24.2   ANION  GAP 15.4* 17.4* 15.8*   BUN 43* 44* 38*   CREATININE 5.85* 6.62* 7.22*   EGFR 9.2* 8.0* 7.2*   GLUCOSE 78 162* 96   CALCIUM 8.6 7.7* 8.3*   MAGNESIUM 3.0* 1.1*  --    PHOSPHORUS 3.7 5.3*  --    TSH  --   --  7.230*         Lab 02/18/24  0528 02/16/24  0746   TOTAL PROTEIN 5.5* 5.7*   ALBUMIN 2.6* 3.0*   GLOBULIN 2.9 2.7   ALT (SGPT) 13 9   AST (SGOT) 30 15   BILIRUBIN 0.5 0.6   ALK PHOS 50 60         Lab 02/16/24  0746   PROBNP 2,200.0*                 Brief Urine Lab Results  (Last result in the past 365 days)        Color   Clarity   Blood   Leuk Est   Nitrite   Protein   CREAT   Urine HCG        02/16/24 1127 Dark Yellow   Clear   Negative   Negative   Negative   100 mg/dL (2+)                 [x]  Microbiology   Microbiology Results (last 10 days)       Procedure Component Value - Date/Time    Clostridioides difficile Toxin - Stool, Per Rectum [[id number]]  (Abnormal) Collected: 02/16/24 2010    Lab Status: Final result Specimen: Stool from Per Rectum Updated: 02/16/24 2138    Narrative:      The following orders were created for panel order Clostridioides difficile Toxin - Stool, Per Rectum.  Procedure                               Abnormality         Status                     ---------                               -----------         ------                     Clostridioides difficile...[[id number]]  Abnormal            Final result                 Please view results for these tests on the individual orders.    Clostridioides difficile Toxin, PCR - Stool, Per Rectum [[id number]]  (Abnormal) Collected: 02/16/24 2010    Lab Status: Final result Specimen: Stool from Per Rectum Updated: 02/16/24 2138     Toxigenic C. difficile by PCR Positive     027 Toxin Presumptive Negative    Narrative:      DNA from a toxigenic strain of C.difficile has been detected. Antigen testing for the presence of free C.difficile toxin is currently in progress, to help determine the clinical significance of this PCR result.     Enteric  Bacterial Panel - Stool, Per Rectum [[id number]]  (Normal) Collected: 02/16/24 2010    Lab Status: Final result Specimen: Stool from Per Rectum Updated: 02/17/24 1248     Salmonella Not Detected     Campylobacter Not Detected     Shigella/Enteroinvasive E. coli (EIEC) Not Detected     Shiga-like toxin-producing E. coli (STEC) stx1/stx2 Not Detected    Clostridioides difficile toxin Ag, Reflex - Stool, Per Rectum [[id number]]  (Normal) Collected: 02/16/24 2010    Lab Status: Final result Specimen: Stool from Per Rectum Updated: 02/16/24 2139     C.diff Toxin Ag Negative    Narrative:      DNA from a toxigenic strain of C.difficile was detected, although the free toxin itself was not detected. These findings are consistent with C.difficile colonization and may not reflect actual C.difficile infection. Clinical correlation needed.    MRSA Screen, PCR (Inpatient) - Swab, Nares [[id number]]  (Normal) Collected: 02/16/24 1447    Lab Status: Final result Specimen: Swab from Nares Updated: 02/16/24 1635     MRSA PCR No MRSA Detected    Narrative:      The negative predictive value of this diagnostic test is high and should only be used to consider de-escalating anti-MRSA therapy. A positive result may indicate colonization with MRSA and must be correlated clinically.    Respiratory Panel PCR w/COVID-19(SARS-CoV-2) RA/TANIA/GAVIN/PAD/COR/NENA In-House, NP Swab in UTM/VTM, 2 HR TAT - Swab, Nasopharynx [[id number]]  (Normal) Collected: 02/16/24 1447    Lab Status: Final result Specimen: Swab from Nasopharynx Updated: 02/16/24 1622     ADENOVIRUS, PCR Not Detected     Coronavirus 229E Not Detected     Coronavirus HKU1 Not Detected     Coronavirus NL63 Not Detected     Coronavirus OC43 Not Detected     COVID19 Not Detected     Human Metapneumovirus Not Detected     Human Rhinovirus/Enterovirus Not Detected     Influenza A PCR Not Detected     Influenza B PCR Not Detected     Parainfluenza Virus 1 Not Detected     Parainfluenza Virus 2  Not Detected     Parainfluenza Virus 3 Not Detected     Parainfluenza Virus 4 Not Detected     RSV, PCR Not Detected     Bordetella pertussis pcr Not Detected     Bordetella parapertussis PCR Not Detected     Chlamydophila pneumoniae PCR Not Detected     Mycoplasma pneumo by PCR Not Detected    Narrative:      In the setting of a positive respiratory panel with a viral infection PLUS a negative procalcitonin without other underlying concern for bacterial infection, consider observing off antibiotics or discontinuation of antibiotics and continue supportive care. If the respiratory panel is positive for atypical bacterial infection (Bordetella pertussis, Chlamydophila pneumoniae, or Mycoplasma pneumoniae), consider antibiotic de-escalation to target atypical bacterial infection.    Legionella Antigen, Urine - Urine, Urine, Clean Catch [[id number]]  (Normal) Collected: 02/16/24 1127    Lab Status: Final result Specimen: Urine, Clean Catch Updated: 02/16/24 1322     LEGIONELLA ANTIGEN, URINE Negative    S. Pneumo Ag Urine or CSF - Urine, Urine, Clean Catch [[id number]]  (Normal) Collected: 02/16/24 1127    Lab Status: Final result Specimen: Urine, Clean Catch Updated: 02/16/24 1322     Strep Pneumo Ag Negative    Body Fluid Culture - Body Fluid, Peritoneum [[id number]] Collected: 02/16/24 1030    Lab Status: Preliminary result Specimen: Body Fluid from Peritoneum Updated: 02/18/24 1109     Body Fluid Culture No growth at 2 days     Gram Stain No WBCs seen      No organisms seen    Blood Culture - Blood, Arm, Left [[id number]]  (Abnormal) Collected: 02/16/24 0937    Lab Status: Final result Specimen: Blood from Arm, Left Updated: 02/18/24 0922     Blood Culture Escherichia coli     Isolated from Aerobic and Anaerobic Bottles     Gram Stain Anaerobic Bottle Gram negative bacilli      Aerobic Bottle Gram negative bacilli    Narrative:      Refer to previous blood culture collected on 2/16 for andreas's      COVID-19, FLU A/B, RSV  PCR 1 HR TAT - Swab, Nasopharynx [[id number]]  (Normal) Collected: 02/16/24 0753    Lab Status: Final result Specimen: Swab from Nasopharynx Updated: 02/16/24 0833     COVID19 Not Detected     Influenza A PCR Not Detected     Influenza B PCR Not Detected     RSV, PCR Not Detected    Narrative:      Fact sheet for providers: https://www.fda.gov/media/736461/download    Fact sheet for patients: https://www.fda.gov/media/183516/download    Test performed by PCR.    Blood Culture - Blood, Arm, Right [[id number]]  (Abnormal)  (Susceptibility) Collected: 02/16/24 0746    Lab Status: Final result Specimen: Blood from Arm, Right Updated: 02/18/24 0922     Blood Culture Escherichia coli     Isolated from Aerobic and Anaerobic Bottles     Gram Stain Aerobic Bottle Gram negative bacilli      Anaerobic Bottle Gram negative bacilli    Susceptibility        Escherichia coli      RITU      Amoxicillin + Clavulanate Susceptible      Ampicillin Resistant      Ampicillin + Sulbactam Resistant      Cefepime Susceptible      Ceftazidime Susceptible      Ceftriaxone Susceptible      Gentamicin Susceptible      Levofloxacin Resistant      Piperacillin + Tazobactam Susceptible      Trimethoprim + Sulfamethoxazole Susceptible                       Susceptibility Comments       Escherichia coli    Cefazolin sensitivity will not be reported for Enterobacteriaceae in non-urine isolates. If cefazolin is preferred, please call the microbiology lab to request an E-test.  With the exception of urinary-sourced infections, aminoglycosides should not be used as monotherapy.               Blood Culture ID, PCR - Blood, Arm, Right [[id number]]  (Abnormal) Collected: 02/16/24 0746    Lab Status: Final result Specimen: Blood from Arm, Right Updated: 02/16/24 2146     BCID, PCR Escherichia coli. Identification by BCID2 PCR.     BOTTLE TYPE Aerobic Bottle    Narrative:      No resistance genes detected.          [x]  Radiology  CT Abdomen Pelvis Without  Contrast    Result Date: 2/16/2024    1. Extensive right lower lobe pneumonia. 2. Pneumoperitoneum similar to prior chest CT likely related to peritoneal dialysis catheter.  No organized fluid collection or abscess. 3. Nonobstructing 11 mm calculus at the right renal pelvis unchanged. 4. Additional chronic findings above.     LISY SANTILLAN MD       Electronically Signed and Approved By: LISY SANTILLAN MD on 2/16/2024 at 14:46             XR Chest 1 View    Result Date: 2/16/2024   Right lower lobe airspace opacity consistent with pneumonia.  Pneumoperitoneum, better seen on same day CT of the abdomen/pelvis.       JOSH HAMPTON MD       Electronically Signed and Approved By: JOSH HAMPTON MD on 2/16/2024 at 14:30             XR Chest 1 View    Result Date: 2/16/2024    No acute process.       LISY SANTILLAN MD       Electronically Signed and Approved By: LISY SANTILLAN MD on 2/16/2024 at 8:07            []  EKG/Telemetry   []  Cardiology/Vascular   []  Pathology  []  Old records  []  Other:    Assessment & Plan   Assessment / Plan     Assessment/Plan:   Severe sepsis with shock (with findings of lactic acidosis and hypotension)  Acute peritonitis ruled out  E. Coli bacteremia  Right lower lobe pneumonia  Possible C. difficile colitis  Acute anion gap metabolic acidosis most likely secondary to lactic acidosis  End-stage renal disease on peritoneal dialysis  Hypokalemia        Continue to monitor on the hospitalist service  Transfer to PCU on today.  Pulmonary critical care consult greatly appreciated  Continue broad-spectrum antibiotic with Zosyn and vancomycin.  2 of 2 blood cultures positive for E. coli.    Continue midodrine  Continue oral vancomycin  Supplemental O2 as needed.  Patient remains on room air  Nephrology consulted for dialysis recommendations.  Continue peritoneal dialysis.  Consult appreciated  Replace potassium    Discussed with patient RN and patient's wife at bedside    DVT prophylaxis:  Medical and  mechanical DVT prophylaxis orders are present.        CODE STATUS:        Electronically signed by Garland Veronica MD, 2/18/2024, 12:11 EST.

## 2024-02-18 NOTE — PROGRESS NOTES
Pulmonary / Critical Care Progress Note      Patient Name: Nelson Wang  : 1945  MRN: [id number]  Attending:  Garland Veronica, *   Date of admission: 2024    Subjective   Subjective   Patient critically ill with septic shock, right lower lobe pneumonia    Over past 24 hours: Remained on Levophed, central line continued, on antibiotics with IV Zosyn.    Overnight, no acute events, O2 weaned, having frequent diarrhea    This morning  Patient is awake, he is in no acute distress on room air  Patient reports he is feeling much better.  Minimal cough and secretions.  Weaned off Levophed drip.  C. difficile PCR positive, is having diarrhea  Tmax 99.1  Lactic acid has normalized.  Magnesium is high.  Receiving PD exchanges per nephrology  Blood cultures 2/2 E. coli    Review of Systems  Constitutional symptoms:  Denied complaints   Ear, nose, throat: Denied complaints  Cardiovascular:  Denied complaints  Respiratory: Denied complaints  Gastrointestinal: Denied complaints  Musculoskeletal: Denied complaints  Neurologic: Denied complaints  Skin: Denied complaints        Objective   Objective     Vitals:   Vital signs for last 24 hours:  Temp:  [98.1 °F (36.7 °C)-99.1 °F (37.3 °C)] 98.1 °F (36.7 °C)  Heart Rate:  [] 66  BP: ()/() 105/73    Intake/Output last 3 shifts:  I/O last 3 completed shifts:  In: 7600 [IV Piggyback:1600]  Out: 6048.1   Intake/Output this shift:  I/O this shift:  In: 1500   Out: 2000     Physical Exam   Vital Signs Reviewed   WDWN, Alert, NAD.  On room air  HEENT:  PERRL, EOMI.  OP, nares clear  Chest:  good aeration, secretions rhonchi right lower lobe to auscultation bilaterally, tympanic to percussion bilaterally, no work of breathing noted  CV: RRR, no MGR, pulses 2+, equal.  Abd:  Soft, NT, ND, + BS, no HSM  EXT:  no clubbing, no cyanosis, no edema  Neuro:  A&Ox3, CN grossly intact, no focal deficits.  Skin: No rashes or lesions noted, PD cath in  place    Result Review    Result Review:  I have personally reviewed the results from the time of this admission to 2/18/2024 06:39 EST and agree with these findings:  [x]  Laboratory  [x]  Microbiology  [x]  Radiology  []  EKG/Telemetry   [x]  Cardiology/Vascular   []  Pathology  []  Old records  []  Other:  Most notable findings include:   Blood culture 2/2 gram-negative bacilli, BC ID E. coli    CT Abdomen Pelvis Without Contrast    Result Date: 2/16/2024    1. Extensive right lower lobe pneumonia. 2. Pneumoperitoneum similar to prior chest CT likely related to peritoneal dialysis catheter.  No organized fluid collection or abscess. 3. Nonobstructing 11 mm calculus at the right renal pelvis unchanged. 4. Additional chronic findings above.     LISY SANTILLAN MD       Electronically Signed and Approved By: LISY SANTILLAN MD on 2/16/2024 at 14:46                         Lab 02/18/24  0528 02/17/24  0230 02/16/24  0746   WBC 11.50* 9.71 5.34   HEMOGLOBIN 9.7* 10.0* 10.9*   HEMATOCRIT 29.9* 32.1* 34.2*   PLATELETS 75* 102* 102*   SODIUM 135* 136 135*   POTASSIUM 3.2* 3.7 3.1*   CHLORIDE 97* 99 95*   CO2 22.6 19.6* 24.2   BUN 43* 44* 38*   CREATININE 5.85* 6.62* 7.22*   GLUCOSE 78 162* 96   CALCIUM 8.6 7.7* 8.3*   PHOSPHORUS 3.7 5.3*  --    TOTAL PROTEIN 5.5*  --  5.7*   ALBUMIN 2.6*  --  3.0*   GLOBULIN 2.9  --  2.7         Assessment & Plan   Assessment / Plan     Active Hospital Problems:  Active Hospital Problems    Diagnosis     **Peritonitis      Impression:  Septic shock, present on admission  Right lower lobe pneumonia of unspecified organism  E. coli bacteremia  Concern for aspiration pneumonia  Possible C. difficile colitis  Acute on chronic diarrhea  Clinically significant lactic acidosis  ESRD on peritoneal dialysis  Anemia of CKD  Hypokalemia  Anion gap metabolic acidosis  Psoriasis on chronic immunosuppression    Plan:    -Patient currently on room air  -Continue aspiration precautions  -Bronchopulmonary  hygiene/I-S/os  -Speech therapy evaluated patient, will plan for modified barium on Monday  -Blood culture 2/2 E. Coli  -Switch to IV ceftriaxone.  -Concerned whether pneumonia versus intra-abdominal infection versus peritoneal catheter infection with bacteremia.  -Repeat blood culture tomorrow.  -Patient is having persistent diarrhea, reports increased in frequency  -C. difficile PCR positive will treat with oral vancomycin in the setting of septic shock  -Check CT scan of chest to evaluate extension of pneumonia  -Recheck CRP tomorrow, consider steroids   -Echocardiogram pending  -Hold antihypertensives  -Trend lactic acid until clear  -Weaned off Levophed drip.  -Continue midodrine 5 mg 3 times daily  -Nephrology following, PD per their service  -Trend renal function, monitor replace electrolytes, will replace IV magnesium  -Okay to use Tylenol for fever  -Start Culturelle for diarrhea.  -Increase activity up to chair daily  -Okay to advance diet per speech recommendations    DVT PPx subcutaneous heparin  Tubes lines and drains, right IJ  DVT prophylaxis:  Medical and mechanical DVT prophylaxis orders are present.    CODE STATUS:          The patient is critically ill in the ICU with shock, peritonitis, lactic acidosis, ESRD on peritoneal dialysis, anemia, hypokalemia, anion gap metabolic acidosis. Multidisciplinary bedside critical care rounds were performed with nursing staff, respiratory therapy, pharmacy, nutritional services, social work. I have personally reviewed the chart, labs and any pertinent imaging available.  We have spent 31 minutes of critical care time, excluding procedures, in the care of this patient.      Electronically signed by Preston Jimenez MD, 02/18/24, 6:39 AM EST.

## 2024-02-18 NOTE — PROGRESS NOTES
"Nephrology progress note    Galileo Cook MD     Current history: Patient is alert, feeling better, shortness of breath is improving, diarrhea is not as bad.  Denies any chest pain denies any nausea or vomiting, tolerating current peritoneal prescription of 1-1/2 L 4 exchanges a day    Current medication list:  !Patient Home Medications Stored on Unit, , Does not apply, BID  bacitracin, 1 Application, Topical, Q12H  cefTRIAXone, 1,000 mg, Intravenous, Q12H  dianeal lo-peter 1.5%, 1,500 mL, Intraperitoneal, 4 Exchanges Daily - Dwell Overnight  heparin (porcine), 5,000 Units, Subcutaneous, Q12H  Culturelle Abdominal Support, 1 dose, Oral, BID  midodrine, 10 mg, Oral, TID AC  potassium chloride, 20 mEq, Oral, Q4H  sodium bicarbonate, 1,300 mg, Oral, BID  sodium chloride, 10 mL, Intravenous, Q12H  vancomycin, 125 mg, Oral, Q6H         acetaminophen **OR** acetaminophen **OR** acetaminophen    melatonin    ondansetron    sodium chloride    sodium chloride     Physical Exam:  /67   Pulse 74   Temp 98.4 °F (36.9 °C) (Oral)   Resp 22   Ht 167.6 cm (66\")   Wt 76.2 kg (167 lb 15.9 oz)   SpO2 92%   BMI 27.11 kg/m²     Intake/Output Summary (Last 24 hours) at 2/18/2024 1539  Last data filed at 2/18/2024 1427  Gross per 24 hour   Intake 6000 ml   Output 6692 ml   Net -692 ml      General: Patient alert and oriented no acute distress, sitting on side of bed  Cardiac: Regular rate and rhythm without a rub no S3 or S4  Lungs: Clear bilaterally no wheezes rhonchi or rales  Abdomen: Bowel sounds positive nontender soft no mass felt no apparent organomegaly noted, Tenckhoff catheter without drainage  Extremities: No edema  or cyanosis  Skin: No acute rashes noted  : Deferred  Neuro: Appears intact with motor and sensory grossly    Results from last 7 days   Lab Units 02/18/24  0528 02/17/24  0230 02/16/24  0746   SODIUM mmol/L 135* 136 135*   POTASSIUM mmol/L 3.2* 3.7 3.1*   CHLORIDE mmol/L 97* 99 95*   CO2 mmol/L " 22.6 19.6* 24.2   BUN mg/dL 43* 44* 38*   CREATININE mg/dL 5.85* 6.62* 7.22*   CALCIUM mg/dL 8.6 7.7* 8.3*   BILIRUBIN mg/dL 0.5  --  0.6   ALK PHOS U/L 50  --  60   ALT (SGPT) U/L 13  --  9   AST (SGOT) U/L 30  --  15   GLUCOSE mg/dL 78 162* 96       Estimated Creatinine Clearance: 11.2 mL/min (A) (by C-G formula based on SCr of 5.85 mg/dL (H)).    Results from last 7 days   Lab Units 02/18/24  0528 02/17/24  0230   MAGNESIUM mg/dL 3.0* 1.1*   PHOSPHORUS mg/dL 3.7 5.3*             Results from last 7 days   Lab Units 02/18/24  0528 02/17/24  0230 02/16/24  0746   WBC 10*3/mm3 11.50* 9.71 5.34   HEMOGLOBIN g/dL 9.7* 10.0* 10.9*   PLATELETS 10*3/mm3 75* 102* 102*             Imaging Results (Last 24 Hours)       Procedure Component Value Units Date/Time    CT Chest Without Contrast Diagnostic [[id number]] Collected: 02/18/24 1004     Updated: 02/18/24 1007    Narrative:      PROCEDURE: CT CHEST WO CONTRAST DIAGNOSTIC     COMPARISON: [facility], CT, CT CHEST WO CONTRAST DIAGNOSTIC, 12/06/2023,   11:11.  [facility], CT, CT ABDOMEN PELVIS WO CONTRAST, 2/16/2024, 14:16.     INDICATIONS: SHORTNESS OF BREATH     PROTOCOL:   Standard imaging protocol performed      RADIATION:   DLP: 313 mGy*cm    Automated exposure control was utilized to minimize radiation dose.      TECHNIQUE: Axial images of the chest without intravenous contrast.     FINDINGS:  Slight improvement in right lower lobe airspace consolidation.  Areas of ground-glass opacity in   the right mid and lower lung field appear stable.  No cavitary lesions are identified.  Stable   pneumoperitoneum and abdominal free fluid likely related to peritoneal dialysis.  Coronary artery   calcification is present.  The thoracic aorta has a normal caliber.  There is no mediastinal,   axillary or hilar adenopathy.  Right IJ central line tip is in the SVC.  There is mild   gynecomastia.  No evidence of pneumothorax.  No evidence of  significant pleural or pericardial   effusion.  Degenerative changes are present in the thoracic spine.     IMPRESSION:  Slight improvement in the right lower lobe airspace consolidation.     EPIFANIO RENTERIA MD         Electronically Signed and Approved By: EPIFANIO RENTERIA MD on 2/18/2024 at 10:04                              Patient Active Problem List   Diagnosis    Essential hypertension    Bradycardia, sinus    Anemia due to chronic kidney disease    Hypothyroidism    Idiopathic gout    Proteinuria    Psoriasis    Thrombocytopenia    Type 2 diabetes mellitus without complication    CKD (chronic kidney disease), stage IV    Hyperlipidemia    Monoclonal gammopathy of unknown significance (MGUS)    Stage 5 chronic kidney disease    Peritoneal dialysis catheter in place    Chronic gout without tophus    Peritoneal dialysis catheter dysfunction    Medicare annual wellness visit, subsequent    Chronic cough    Peritonitis       Assessment/plan:    1.  ESRD-  On PD at home.  Continue 1-1/2 L 4 exchanges a day,  volume status stable, continue for now.  PD fluid negative for acute infection    2.  Hypotension/sepsis/E. coli and blood-    CT with evidence of RLL PNA.  Diarrhea as well treatment per primary and/or critical care    3.  Dyspnea-  flu/covid/rsv negative.  Continues to improve    4.  Anemia of CKD-hemoglobin slowly decreasing, no treatment at this time, will follow trends    5.  Hypokalemia-potassium still low will got 20 mill equivalents while I was in the room, due to his diarrhea and his PD will probably need more and will give a couple more doses today check in a.m.    6.  Metabolic acidosis: Probably combination of lactic acidosis as well as renal failure, improving, continue oral bicarb    Galileo Cook MD

## 2024-02-19 ENCOUNTER — APPOINTMENT (OUTPATIENT)
Dept: GENERAL RADIOLOGY | Facility: HOSPITAL | Age: 79
DRG: 871 | End: 2024-02-19
Payer: MEDICARE

## 2024-02-19 LAB
ALBUMIN SERPL-MCNC: 2.5 G/DL (ref 3.5–5.2)
ALBUMIN/GLOB SERPL: 0.7 G/DL
ALP SERPL-CCNC: 67 U/L (ref 39–117)
ALT SERPL W P-5'-P-CCNC: 15 U/L (ref 1–41)
ANION GAP SERPL CALCULATED.3IONS-SCNC: 15.5 MMOL/L (ref 5–15)
AST SERPL-CCNC: 30 U/L (ref 1–40)
BACTERIA FLD CULT: NORMAL
BASOPHILS # BLD AUTO: 0.06 10*3/MM3 (ref 0–0.2)
BASOPHILS NFR BLD AUTO: 0.4 % (ref 0–1.5)
BH CV ECHO MEAS - AO MAX PG: 10.4 MMHG
BH CV ECHO MEAS - AO MEAN PG: 4.7 MMHG
BH CV ECHO MEAS - AO V2 MAX: 161.5 CM/SEC
BH CV ECHO MEAS - AO V2 VTI: 32.7 CM
BH CV ECHO MEAS - AVA(I,D): 1.53 CM2
BH CV ECHO MEAS - EDV(CUBED): 70.6 ML
BH CV ECHO MEAS - EDV(MOD-SP2): 79.9 ML
BH CV ECHO MEAS - EDV(MOD-SP4): 112 ML
BH CV ECHO MEAS - EF(MOD-BP): 56.6 %
BH CV ECHO MEAS - EF(MOD-SP2): 56.4 %
BH CV ECHO MEAS - EF(MOD-SP4): 56.1 %
BH CV ECHO MEAS - ESV(CUBED): 17.7 ML
BH CV ECHO MEAS - ESV(MOD-SP2): 34.8 ML
BH CV ECHO MEAS - ESV(MOD-SP4): 49.2 ML
BH CV ECHO MEAS - FS: 37 %
BH CV ECHO MEAS - IVS/LVPW: 0.97 CM
BH CV ECHO MEAS - IVSD: 1.1 CM
BH CV ECHO MEAS - LAT PEAK E' VEL: 12.5 CM/SEC
BH CV ECHO MEAS - LV DIASTOLIC VOL/BSA (35-75): 60.5 CM2
BH CV ECHO MEAS - LV MASS(C)D: 156.5 GRAMS
BH CV ECHO MEAS - LV MAX PG: 3.1 MMHG
BH CV ECHO MEAS - LV MEAN PG: 1.8 MMHG
BH CV ECHO MEAS - LV SYSTOLIC VOL/BSA (12-30): 26.6 CM2
BH CV ECHO MEAS - LV V1 MAX: 88.7 CM/SEC
BH CV ECHO MEAS - LV V1 VTI: 15.9 CM
BH CV ECHO MEAS - LVIDD: 4.1 CM
BH CV ECHO MEAS - LVIDS: 2.6 CM
BH CV ECHO MEAS - LVOT AREA: 3.1 CM2
BH CV ECHO MEAS - LVOT DIAM: 2 CM
BH CV ECHO MEAS - LVPWD: 1.14 CM
BH CV ECHO MEAS - MED PEAK E' VEL: 8.7 CM/SEC
BH CV ECHO MEAS - MV A MAX VEL: 84.8 CM/SEC
BH CV ECHO MEAS - MV DEC SLOPE: 429.4 CM/SEC2
BH CV ECHO MEAS - MV DEC TIME: 0.22 SEC
BH CV ECHO MEAS - MV E MAX VEL: 93 CM/SEC
BH CV ECHO MEAS - MV E/A: 1.1
BH CV ECHO MEAS - RVDD: 3.2 CM
BH CV ECHO MEAS - SI(MOD-SP2): 24.3 ML/M2
BH CV ECHO MEAS - SI(MOD-SP4): 33.9 ML/M2
BH CV ECHO MEAS - SV(LVOT): 50 ML
BH CV ECHO MEAS - SV(MOD-SP2): 45.1 ML
BH CV ECHO MEAS - SV(MOD-SP4): 62.8 ML
BH CV ECHO MEAS - TAPSE (>1.6): 1.31 CM
BH CV ECHO MEASUREMENTS AVERAGE E/E' RATIO: 8.77
BILIRUB SERPL-MCNC: 0.4 MG/DL (ref 0–1.2)
BUN SERPL-MCNC: 42 MG/DL (ref 8–23)
BUN/CREAT SERPL: 7.6 (ref 7–25)
CALCIUM SPEC-SCNC: 8.9 MG/DL (ref 8.6–10.5)
CHLORIDE SERPL-SCNC: 99 MMOL/L (ref 98–107)
CO2 SERPL-SCNC: 21.5 MMOL/L (ref 22–29)
CREAT SERPL-MCNC: 5.54 MG/DL (ref 0.76–1.27)
CRP SERPL-MCNC: 23.7 MG/DL (ref 0–0.5)
DEPRECATED RDW RBC AUTO: 51.6 FL (ref 37–54)
EGFRCR SERPLBLD CKD-EPI 2021: 9.9 ML/MIN/1.73
EOSINOPHIL # BLD AUTO: 0.16 10*3/MM3 (ref 0–0.4)
EOSINOPHIL NFR BLD AUTO: 1.1 % (ref 0.3–6.2)
ERYTHROCYTE [DISTWIDTH] IN BLOOD BY AUTOMATED COUNT: 15.1 % (ref 12.3–15.4)
ERYTHROCYTE [SEDIMENTATION RATE] IN BLOOD: 67 MM/HR (ref 0–20)
FERRITIN SERPL-MCNC: 1939 NG/ML (ref 30–400)
FOLATE SERPL-MCNC: 11.9 NG/ML (ref 4.78–24.2)
GLOBULIN UR ELPH-MCNC: 3.4 GM/DL
GLUCOSE SERPL-MCNC: 115 MG/DL (ref 65–99)
GRAM STN SPEC: NORMAL
GRAM STN SPEC: NORMAL
HCT VFR BLD AUTO: 30.7 % (ref 37.5–51)
HGB BLD-MCNC: 9.9 G/DL (ref 13–17.7)
IGA1 MFR SER: 138 MG/DL (ref 70–400)
IMM GRANULOCYTES # BLD AUTO: 0.11 10*3/MM3 (ref 0–0.05)
IMM GRANULOCYTES NFR BLD AUTO: 0.8 % (ref 0–0.5)
IRON 24H UR-MRATE: 67 MCG/DL (ref 59–158)
IRON SATN MFR SERPL: 36 % (ref 20–50)
IVRT: 69 MS
LEFT ATRIUM VOLUME INDEX: 19.1 ML/M2
LYMPHOCYTES # BLD AUTO: 1.74 10*3/MM3 (ref 0.7–3.1)
LYMPHOCYTES NFR BLD AUTO: 12.4 % (ref 19.6–45.3)
MAGNESIUM SERPL-MCNC: 2.6 MG/DL (ref 1.6–2.4)
MCH RBC QN AUTO: 30.5 PG (ref 26.6–33)
MCHC RBC AUTO-ENTMCNC: 32.2 G/DL (ref 31.5–35.7)
MCV RBC AUTO: 94.5 FL (ref 79–97)
MONOCYTES # BLD AUTO: 1.18 10*3/MM3 (ref 0.1–0.9)
MONOCYTES NFR BLD AUTO: 8.4 % (ref 5–12)
NEUTROPHILS NFR BLD AUTO: 10.74 10*3/MM3 (ref 1.7–7)
NEUTROPHILS NFR BLD AUTO: 76.9 % (ref 42.7–76)
NRBC BLD AUTO-RTO: 0 /100 WBC (ref 0–0.2)
PHOSPHATE SERPL-MCNC: 3.4 MG/DL (ref 2.5–4.5)
PLATELET # BLD AUTO: 82 10*3/MM3 (ref 140–450)
PMV BLD AUTO: 11.7 FL (ref 6–12)
POTASSIUM SERPL-SCNC: 3.3 MMOL/L (ref 3.5–5.2)
PROT SERPL-MCNC: 5.9 G/DL (ref 6–8.5)
RBC # BLD AUTO: 3.25 10*6/MM3 (ref 4.14–5.8)
SODIUM SERPL-SCNC: 136 MMOL/L (ref 136–145)
T-UPTAKE NFR SERPL: 0.87 TBI (ref 0.8–1.3)
T4 SERPL-MCNC: 3.1 MCG/DL (ref 4.5–11.7)
TIBC SERPL-MCNC: 186 MCG/DL (ref 298–536)
TRANSFERRIN SERPL-MCNC: 125 MG/DL (ref 200–360)
TSH SERPL DL<=0.05 MIU/L-ACNC: 14.8 UIU/ML (ref 0.27–4.2)
VIT B12 BLD-MCNC: >2000 PG/ML (ref 211–946)
WBC NRBC COR # BLD AUTO: 13.99 10*3/MM3 (ref 3.4–10.8)

## 2024-02-19 PROCEDURE — 84466 ASSAY OF TRANSFERRIN: CPT | Performed by: INTERNAL MEDICINE

## 2024-02-19 PROCEDURE — 83735 ASSAY OF MAGNESIUM: CPT | Performed by: INTERNAL MEDICINE

## 2024-02-19 PROCEDURE — 36415 COLL VENOUS BLD VENIPUNCTURE: CPT | Performed by: INTERNAL MEDICINE

## 2024-02-19 PROCEDURE — 25010000002 HEPARIN (PORCINE) PER 1000 UNITS: Performed by: INTERNAL MEDICINE

## 2024-02-19 PROCEDURE — 99222 1ST HOSP IP/OBS MODERATE 55: CPT | Performed by: INTERNAL MEDICINE

## 2024-02-19 PROCEDURE — 86364 TISS TRNSGLTMNASE EA IG CLAS: CPT | Performed by: INTERNAL MEDICINE

## 2024-02-19 PROCEDURE — 80053 COMPREHEN METABOLIC PANEL: CPT | Performed by: INTERNAL MEDICINE

## 2024-02-19 PROCEDURE — 84100 ASSAY OF PHOSPHORUS: CPT | Performed by: INTERNAL MEDICINE

## 2024-02-19 PROCEDURE — 85025 COMPLETE CBC W/AUTO DIFF WBC: CPT | Performed by: INTERNAL MEDICINE

## 2024-02-19 PROCEDURE — 74230 X-RAY XM SWLNG FUNCJ C+: CPT

## 2024-02-19 PROCEDURE — 92611 MOTION FLUOROSCOPY/SWALLOW: CPT

## 2024-02-19 PROCEDURE — 99233 SBSQ HOSP IP/OBS HIGH 50: CPT | Performed by: INTERNAL MEDICINE

## 2024-02-19 PROCEDURE — 83540 ASSAY OF IRON: CPT | Performed by: INTERNAL MEDICINE

## 2024-02-19 PROCEDURE — 63710000001 DIPHENHYDRAMINE PER 50 MG

## 2024-02-19 PROCEDURE — 25010000002 CEFTRIAXONE PER 250 MG: Performed by: INTERNAL MEDICINE

## 2024-02-19 PROCEDURE — 82728 ASSAY OF FERRITIN: CPT | Performed by: INTERNAL MEDICINE

## 2024-02-19 PROCEDURE — 82607 VITAMIN B-12: CPT | Performed by: INTERNAL MEDICINE

## 2024-02-19 PROCEDURE — 85652 RBC SED RATE AUTOMATED: CPT | Performed by: INTERNAL MEDICINE

## 2024-02-19 PROCEDURE — 82746 ASSAY OF FOLIC ACID SERUM: CPT | Performed by: INTERNAL MEDICINE

## 2024-02-19 PROCEDURE — 84479 ASSAY OF THYROID (T3 OR T4): CPT | Performed by: FAMILY MEDICINE

## 2024-02-19 PROCEDURE — 84443 ASSAY THYROID STIM HORMONE: CPT | Performed by: FAMILY MEDICINE

## 2024-02-19 PROCEDURE — 99233 SBSQ HOSP IP/OBS HIGH 50: CPT | Performed by: FAMILY MEDICINE

## 2024-02-19 PROCEDURE — 86140 C-REACTIVE PROTEIN: CPT | Performed by: INTERNAL MEDICINE

## 2024-02-19 PROCEDURE — 97165 OT EVAL LOW COMPLEX 30 MIN: CPT

## 2024-02-19 PROCEDURE — 84436 ASSAY OF TOTAL THYROXINE: CPT | Performed by: FAMILY MEDICINE

## 2024-02-19 PROCEDURE — 82784 ASSAY IGA/IGD/IGG/IGM EACH: CPT | Performed by: INTERNAL MEDICINE

## 2024-02-19 RX ORDER — POTASSIUM CHLORIDE 750 MG/1
10 CAPSULE, EXTENDED RELEASE ORAL ONCE
Status: COMPLETED | OUTPATIENT
Start: 2024-02-19 | End: 2024-02-19

## 2024-02-19 RX ORDER — MIDODRINE HYDROCHLORIDE 5 MG/1
5 TABLET ORAL
Status: DISCONTINUED | OUTPATIENT
Start: 2024-02-19 | End: 2024-02-20

## 2024-02-19 RX ORDER — DIPHENHYDRAMINE HCL 25 MG
25 CAPSULE ORAL NIGHTLY PRN
Status: DISCONTINUED | OUTPATIENT
Start: 2024-02-19 | End: 2024-02-20

## 2024-02-19 RX ORDER — SACCHAROMYCES BOULARDII 250 MG
250 CAPSULE ORAL 2 TIMES DAILY
Status: DISCONTINUED | OUTPATIENT
Start: 2024-02-19 | End: 2024-02-19

## 2024-02-19 RX ORDER — LEVOTHYROXINE SODIUM 0.12 MG/1
125 TABLET ORAL
Status: DISCONTINUED | OUTPATIENT
Start: 2024-02-20 | End: 2024-02-24 | Stop reason: HOSPADM

## 2024-02-19 RX ORDER — POTASSIUM CHLORIDE 750 MG/1
20 CAPSULE, EXTENDED RELEASE ORAL ONCE
Status: COMPLETED | OUTPATIENT
Start: 2024-02-19 | End: 2024-02-19

## 2024-02-19 RX ADMIN — SODIUM CHLORIDE, SODIUM LACTATE, CALCIUM CHLORIDE, MAGNESIUM CHLORIDE AND DEXTROSE 1500 ML: 1.5; 538; 448; 18.3; 5.08 INJECTION, SOLUTION INTRAPERITONEAL at 17:53

## 2024-02-19 RX ADMIN — BARIUM SULFATE 355 ML: 0.6 SUSPENSION ORAL at 14:36

## 2024-02-19 RX ADMIN — BARIUM SULFATE 1 TEASPOON(S): 0.6 CREAM ORAL at 14:36

## 2024-02-19 RX ADMIN — SODIUM BICARBONATE 650 MG TABLET 1300 MG: at 21:11

## 2024-02-19 RX ADMIN — Medication 1 DOSE: at 21:13

## 2024-02-19 RX ADMIN — Medication 10 ML: at 09:00

## 2024-02-19 RX ADMIN — MIDODRINE HYDROCHLORIDE 5 MG: 5 TABLET ORAL at 17:40

## 2024-02-19 RX ADMIN — SODIUM CHLORIDE, SODIUM LACTATE, CALCIUM CHLORIDE, MAGNESIUM CHLORIDE AND DEXTROSE 1500 ML: 1.5; 538; 448; 18.3; 5.08 INJECTION, SOLUTION INTRAPERITONEAL at 12:59

## 2024-02-19 RX ADMIN — BARIUM SULFATE 55 ML: 0.81 POWDER, FOR SUSPENSION ORAL at 14:36

## 2024-02-19 RX ADMIN — Medication 10 ML: at 21:12

## 2024-02-19 RX ADMIN — SODIUM CHLORIDE, SODIUM LACTATE, CALCIUM CHLORIDE, MAGNESIUM CHLORIDE AND DEXTROSE 1500 ML: 1.5; 538; 448; 18.3; 5.08 INJECTION, SOLUTION INTRAPERITONEAL at 08:16

## 2024-02-19 RX ADMIN — VANCOMYCIN HYDROCHLORIDE 125 MG: 125 CAPSULE ORAL at 13:44

## 2024-02-19 RX ADMIN — POTASSIUM CHLORIDE 20 MEQ: 10 CAPSULE, COATED, EXTENDED RELEASE ORAL at 10:08

## 2024-02-19 RX ADMIN — SODIUM CHLORIDE, SODIUM LACTATE, CALCIUM CHLORIDE, MAGNESIUM CHLORIDE AND DEXTROSE 1500 ML: 1.5; 538; 448; 18.3; 5.08 INJECTION, SOLUTION INTRAPERITONEAL at 23:34

## 2024-02-19 RX ADMIN — SERTRALINE HYDROCHLORIDE 100 MG: 100 TABLET ORAL at 21:12

## 2024-02-19 RX ADMIN — RIFAXIMIN 550 MG: 550 TABLET ORAL at 16:31

## 2024-02-19 RX ADMIN — Medication 1 DOSE: at 08:56

## 2024-02-19 RX ADMIN — CEFTRIAXONE SODIUM 1000 MG: 1 INJECTION, POWDER, FOR SOLUTION INTRAMUSCULAR; INTRAVENOUS at 15:54

## 2024-02-19 RX ADMIN — MIDODRINE HYDROCHLORIDE 10 MG: 5 TABLET ORAL at 07:48

## 2024-02-19 RX ADMIN — Medication 250 MG: at 10:08

## 2024-02-19 RX ADMIN — VANCOMYCIN HYDROCHLORIDE 125 MG: 125 CAPSULE ORAL at 17:40

## 2024-02-19 RX ADMIN — SODIUM BICARBONATE 650 MG TABLET 1300 MG: at 08:56

## 2024-02-19 RX ADMIN — MIDODRINE HYDROCHLORIDE 10 MG: 5 TABLET ORAL at 12:03

## 2024-02-19 RX ADMIN — DIPHENHYDRAMINE HYDROCHLORIDE 25 MG: 25 CAPSULE ORAL at 23:14

## 2024-02-19 RX ADMIN — POTASSIUM CHLORIDE 10 MEQ: 10 CAPSULE, COATED, EXTENDED RELEASE ORAL at 07:47

## 2024-02-19 RX ADMIN — CEFTRIAXONE SODIUM 1000 MG: 1 INJECTION, POWDER, FOR SOLUTION INTRAMUSCULAR; INTRAVENOUS at 01:14

## 2024-02-19 RX ADMIN — VANCOMYCIN HYDROCHLORIDE 125 MG: 125 CAPSULE ORAL at 23:14

## 2024-02-19 RX ADMIN — HEPARIN SODIUM 5000 UNITS: 5000 INJECTION INTRAVENOUS; SUBCUTANEOUS at 08:56

## 2024-02-19 RX ADMIN — VANCOMYCIN HYDROCHLORIDE 125 MG: 125 CAPSULE ORAL at 06:23

## 2024-02-19 RX ADMIN — BARIUM SULFATE 50 ML: 400 SUSPENSION ORAL at 14:36

## 2024-02-19 RX ADMIN — NYSTATIN 500000 UNITS: 100000 SUSPENSION ORAL at 17:40

## 2024-02-19 RX ADMIN — HEPARIN SODIUM 5000 UNITS: 5000 INJECTION INTRAVENOUS; SUBCUTANEOUS at 21:12

## 2024-02-19 RX ADMIN — NYSTATIN 500000 UNITS: 100000 SUSPENSION ORAL at 21:12

## 2024-02-19 RX ADMIN — BACITRACIN 0.9 G: 500 OINTMENT TOPICAL at 08:57

## 2024-02-19 NOTE — MBS/VFSS/FEES
Acute Care - Speech Language Pathology   Swallow  Modified Barium Swallow Study   Nate     Patient Name: Nelson Wang  : 1945  MRN: [id number]  Today's Date: 2024               Admit Date: 2024    Visit Dx:     ICD-10-CM ICD-9-CM   1. Febrile illness  R50.9 780.60   2. Weakness  R53.1 780.79   3. End stage renal disease  N18.6 585.6   4. Sepsis, due to unspecified organism, unspecified whether acute organ dysfunction present  A41.9 038.9     995.91   5. Dysphagia, unspecified type  R13.10 787.20     Patient Active Problem List   Diagnosis    Essential hypertension    Bradycardia, sinus    Anemia due to chronic kidney disease    Hypothyroidism    Idiopathic gout    Proteinuria    Psoriasis    Thrombocytopenia    Type 2 diabetes mellitus without complication    CKD (chronic kidney disease), stage IV    Hyperlipidemia    Monoclonal gammopathy of unknown significance (MGUS)    Stage 5 chronic kidney disease    Peritoneal dialysis catheter in place    Chronic gout without tophus    Peritoneal dialysis catheter dysfunction    Medicare annual wellness visit, subsequent    Chronic cough    Peritonitis     Past Medical History:   Diagnosis Date    Anemia     Ankle pain, right     CKD (chronic kidney disease), stage IV     Diabetes     Gout     High cholesterol     HL (hearing loss)     Hyperkalemia     Hypertension     Hypothyroidism     Psoriasis     Vitiligo      Past Surgical History:   Procedure Laterality Date    ANKLE SURGERY  1990    APPENDECTOMY N/A     COLONOSCOPY N/A 2022    [facility]    HIP BIPOLAR REPLACEMENT Right 2000    INSERTION PERITONEAL DIALYSIS CATHETER N/A 3/27/2023    Procedure: LAPAROSCOPIC INSERTION PERITONEAL DIALYSIS CATHETER, LAPAROSCOPIC OMENTOPEXY WITH LYSIS OF ADHESIONS;  Surgeon: Jose Berry MD;  Location: [facility];  Service: General;  Laterality: N/A;    INSERTION PERITONEAL DIALYSIS CATHETER Left 2023    Procedure: REVISION OF  PERITONEAL DIALYSIS CATHETER;  Surgeon: Radha Oreilly MD;  Location: [facility] OR;  Service: General;  Laterality: Left;    RENAL BIOPSY Left 07/15/2022         MODIFIED BARIUM SWALLOW STUDY: SPEECH PATHOLOGY REPORT        DATE OF SERVICE: 2/19/2024    PERTINENT INFORMATION:  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    He was referred for an MBSS by Dr. Jimenez to rule out aspiration as well as to determine appropriate treatment plan for this patient.      PROCEDURE:    Dr. Wang was alert and cooperative.  The patient was viewed in the lateral plane.  The following Ba consistencies were administered: Thin liquids, nectar thick liquids, barium mixed with applesauce, barium paste, barium mixed with cracker. The following compensatory swallowing strategies were performed: Bolus modification, cyclic ingestion, chin tuck.  Patient does complain of dry mouth.      RESULTS:    1.  Nectar liquid by spoon with swallow completed.  2. Nectar liquid by cup with spillage to the vallecula, swallow completed.  3. Thin liquid by cup with maximum spillage to the vallecula, swallow completed with laryngeal penetration which appears to clear.  4. Purée with spillage to the vallecula, swallow completed.  Oral residue spilling to the vallecula with swallow to clear.  5. Pudding with slow A-P transit, spillage to the vallecula, swallow completed.  6. Solid results with chewing, slowed A-P transit, spillage t over base of tongue followed by swallow completed.  Residue noted at tongue base.  7. Thin liquid via straw with spillage to the pharynx and into the laryngeal vestibule, swallow completed with deep laryngeal penetration to the level of the vocal cords.  No cough produced.  8.  Thin liquid by cup with small sip, spillage to the pharynx, swallow completed with trace laryngeal penetration.    9.Second trial of thin liquid by cup with patient cued for chin tuck, patient taking large drink, spillage to the  pharynx and into the laryngeal vestibule, swallow completed with aspiration occurring with no cough.      IMPRESSIONS:    Dr. Wang demonstrated oropharyngeal dysphagia characterized by likely xerostomia, swallow delay, decreased laryngeal elevation at times.  Laryngeal penetration and silent aspiration were noted with thin liquids.  Please see radiologist report for esophageal phase of swallow.    FUNCTIONAL DEFICIT: Patient scored level 5 of 7 on Functional Communication Measures for swallowing indicating a 20-39% limitation in function for current status, goal status, and discharge status.      RECOMMENDATIONS:   1.  Diet: Regular soft moist solids, nectar thickened liquid.  No straw.  2.  Positioning fully upright for all p.o. intake and 30 minutes following.  3.  Alternate small bites and small sips of solids and liquids at a slow rate.  Small single sips of liquid with no straw.  Medications whole in applesauce. Reflux precautions.  4.  Follow-up with speech pathology services to address dysphagia.        Yes, patient/responsible party agrees with the plan of care and has been informed of all alternatives, risks and benefits.    Thank you for this referral.                                                                               Plan of Care Reviewed With: patient          EDUCATION  The patient has been educated in the following areas:   Modified Diet Instruction.              Time Calculation:    Time Calculation- SLP       Row Name 02/19/24 1437             Time Calculation- SLP    SLP Received On 02/19/24  -TB         Untimed Charges    SLP Eval/Re-eval  ST Motion Fluoro Eval Swallow - 27747  -TB      24026-CZ Motion Fluoro Eval Swallow Minutes 90  -TB         Total Minutes    Untimed Charges Total Minutes 90  -TB       Total Minutes 90  -TB                User Key  (r) = Recorded By, (t) = Taken By, (c) = Cosigned By      Initials Name Provider Type    Katie Triplett SLP Speech and Language  Pathologist                    Therapy Charges for Today       Code Description Service Date Service Provider Modifiers Qty    [id number] HC ST MOTION FLUORO EVAL SWALLOW 6 2/19/2024 Katie Isaacs, SLP GN 1                 ADILSON Álvarez  2/19/2024

## 2024-02-19 NOTE — PROGRESS NOTES
[facility]     Nephrology Progress Note      Patient Name: Nelson Wang  : 1945  MRN: [id number]  Primary Care Physician:  Janna Mo MD  Date of admission: 2024    Subjective   Subjective     Interval History:  Patient Reports feeling somewhat better, mild shortness of breath with any exertion.  Still with significant diarrhea, every 15 to 30 minutes, watery.  PD fluid not compatible with peritonitis.  E. coli from the blood positive.    Review of Systems   All systems were reviewed and negative except for: What is mentioned above.    Objective   Objective     Vitals:   Temp:  [97.9 °F (36.6 °C)-98.5 °F (36.9 °C)] 97.9 °F (36.6 °C)  Heart Rate:  [68-76] 74  Resp:  [18-24] 18  BP: (129-165)/(50-81) 153/81  Physical Exam:   Constitutional: Awake, alert, no distress, conversant and pleasant.   Eyes: sclerae anicteric, no conjunctival injection, some pallor noted.   HENT: mucous membranes moist   Neck: Supple, no thyromegaly, no lymphadenopathy, trachea midline, No JVD   Respiratory: Decreased to auscultation bilaterally, nonlabored respirations    Cardiovascular: RRR, no murmurs, rubs, or gallops.   Gastrointestinal: Positive bowel sounds, soft, nontender, nondistended, site of PD catheter without drainage, erythema or tenderness.   Musculoskeletal: No edema, no clubbing or cyanosis   Psychiatric: Appropriate affect, cooperative   Neurologic: Oriented x 3, moving all extremities, Cranial Nerves grossly intact, speech clear   Skin: warm and dry, no rashes, + vitiligo    Result Review    Result Reviewed:  I have personally reviewed the results from the time of this admission to 2024 16:40 EST and agree with these findings:  [x]  Laboratory  []  Microbiology  [x]  Radiology  []  EKG/Telemetry   []  Cardiology/Vascular   []  Pathology  []  Old records  []  Other:        Lab 24  0433 24  0528 24  0230 24  0746   SODIUM 136 135* 136 135*   POTASSIUM 3.3* 3.2* 3.7  3.1*   CHLORIDE 99 97* 99 95*   CO2 21.5* 22.6 19.6* 24.2   BUN 42* 43* 44* 38*   CREATININE 5.54* 5.85* 6.62* 7.22*   GLUCOSE 115* 78 162* 96   EGFR 9.9* 9.2* 8.0* 7.2*   ANION GAP 15.5* 15.4* 17.4* 15.8*   MAGNESIUM 2.6* 3.0* 1.1*  --    PHOSPHORUS 3.4 3.7 5.3*  --            Most notable findings include: Potassium 3.3 today albumin 2.5, hemoglobin 9.9, platelets 82.  CT scan showed right lower lobe consolidation/pneumonia.  11 mm nonobstructive right renal pelvis stone.    Assessment & Plan   Assessment / Plan       Active Hospital Problems:  Active Hospital Problems    Diagnosis     **Peritonitis        Assessment and Plan:    - ESRD, on peritoneal dialysis.  Volume status is adequate.  Electrolytes are as noted above.  Continue 1.5 L, 1.5% dextrose solution 4 times a day.  Monitor closely.  Will add nystatin swish and swallow while on antibiotics for fungal peritonitis prevention.  - Severe, watery diarrhea, being evaluated by GI.  - E. coli sepsis, source is unclear to me, possibly related to GI translocation or pneumonia.  On therapy.  No evidence of peritonitis.  - Hypokalemia, being replaced.  Liberalize diet.  Add protein supplements/shakes if able to tolerate.  - Anemia of chronic kidney disease, was on long-acting RONALD as outpatient.  Last dose of Mircera 12/5/2023.  Will check iron studies if hemoglobin is worse.  - Thrombocytopenia, no bleeding, monitor.  - Hypothyroidism, not receiving levothyroxine, per primary.  - Metabolic acidosis, on p.o. bicarb.  Monitor.  - Right lower lobe pneumonia, per primary.  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  Will follow.  Please call with any questions.      Electronically signed by Coreen Castro MD, 2/19/2024, 16:40 EST.

## 2024-02-19 NOTE — PROGRESS NOTES
Pulmonary / Critical Care Progress Note      Patient Name: Nelson Wang  : 1945  MRN: [id number]  Attending:  Saúl Vargas MD   Date of admission: 2024    Subjective   Subjective   Follow-up for septic shock, right lower lobe pneumonia    Out of ICU  On room air  Denies any coughing or dyspnea  Still having issues with diarrhea  On PD fluid exchanges  On antibiotics for E. coli bacteremia  No fevers noted    Objective   Objective     Vitals:   Vital signs for last 24 hours:  Temp:  [97.9 °F (36.6 °C)-98.5 °F (36.9 °C)] 97.9 °F (36.6 °C)  Heart Rate:  [68-76] 71  Resp:  [18-24] 18  BP: (129-165)/(50-98) 152/66    Intake/Output last 3 shifts:  I/O last 3 completed shifts:  In: 4740 [P.O.:240]  Out: 5128   Intake/Output this shift:  I/O this shift:  In: 3120 [P.O.:120]  Out: 2800     Physical Exam   Vital Signs Reviewed   WDWN, Alert, NAD.  On room air  HEENT:  PERRL, EOMI.  OP, nares clear  Chest:  good aeration, decreasing right base rhonchi, tympanic to percussion bilaterally, no work of breathing noted  CV: RRR, no MGR, pulses 2+, equal.  Abd:  Soft, NT, ND, + BS, no HSM  EXT:  no clubbing, no cyanosis, no edema  Neuro:  A&Ox3, CN grossly intact, no focal deficits.  Skin: No rashes or lesions noted, PD cath in place    Result Review    Result Review:  I have personally reviewed the results from the time of this admission to 2024 15:13 EST and agree with these findings:  [x]  Laboratory  [x]  Microbiology  [x]  Radiology  []  EKG/Telemetry   [x]  Cardiology/Vascular   []  Pathology  []  Old records  []  Other:  Most notable findings include:   Blood culture 2/ gram-negative bacilli, BC ID E. coli  C. difficile positive                      Lab 24  0501 24  0433 24  0528 24  0230 24  0746   WBC 13.99*  --  11.50* 9.71 5.34   HEMOGLOBIN 9.9*  --  9.7* 10.0* 10.9*   HEMATOCRIT 30.7*  --  29.9* 32.1* 34.2*   PLATELETS 82*  --  75* 102* 102*   SODIUM  --  136 135*  136 135*   POTASSIUM  --  3.3* 3.2* 3.7 3.1*   CHLORIDE  --  99 97* 99 95*   CO2  --  21.5* 22.6 19.6* 24.2   BUN  --  42* 43* 44* 38*   CREATININE  --  5.54* 5.85* 6.62* 7.22*   GLUCOSE  --  115* 78 162* 96   CALCIUM  --  8.9 8.6 7.7* 8.3*   PHOSPHORUS  --  3.4 3.7 5.3*  --    TOTAL PROTEIN  --  5.9* 5.5*  --  5.7*   ALBUMIN  --  2.5* 2.6*  --  3.0*   GLOBULIN  --  3.4 2.9  --  2.7     CT Chest Without Contrast Diagnostic    Result Date: 2/18/2024  PROCEDURE: CT CHEST WO CONTRAST DIAGNOSTIC  COMPARISON: [facility], CT, CT CHEST WO CONTRAST DIAGNOSTIC, 12/06/2023, 11:11.  [facility], CT, CT ABDOMEN PELVIS WO CONTRAST, 2/16/2024, 14:16.  INDICATIONS: SHORTNESS OF BREATH  PROTOCOL:   Standard imaging protocol performed    RADIATION:   DLP: 313 mGy*cm   Automated exposure control was utilized to minimize radiation dose.  TECHNIQUE: Axial images of the chest without intravenous contrast.  FINDINGS: Slight improvement in right lower lobe airspace consolidation.  Areas of ground-glass opacity in the right mid and lower lung field appear stable.  No cavitary lesions are identified.  Stable pneumoperitoneum and abdominal free fluid likely related to peritoneal dialysis.  Coronary artery calcification is present.  The thoracic aorta has a normal caliber.  There is no mediastinal, axillary or hilar adenopathy.  Right IJ central line tip is in the SVC.  There is mild gynecomastia.  No evidence of pneumothorax.  No evidence of significant pleural or pericardial effusion.  Degenerative changes are present in the thoracic spine.  IMPRESSION:  Slight improvement in the right lower lobe airspace consolidation.  EPIFANIO RENTERIA MD       Electronically Signed and Approved By: EPIFANIO RENTERIA MD on 2/18/2024 at 10:04                Assessment & Plan   Assessment / Plan     Active Hospital Problems:  Active Hospital Problems    Diagnosis     **Peritonitis      Impression:  Septic shock, present on  admission  Right lower lobe pneumonia of unspecified organism  E. coli bacteremia  C. difficile colitis  Aspiration pneumonia of right lower lobe.  Suspect related to E. coli  Acute on chronic diarrhea  Clinically significant lactic acidosis  ESRD on peritoneal dialysis  Anemia of CKD  Hypokalemia  Anion gap metabolic acidosis  Psoriasis on chronic immunosuppression    Plan:  -Patient currently on room air  -Continue aspiration precautions  -Continue ceftriaxone for E. coli bacteremia  -Chest x-ray personally reviewed showing improving pneumonia  -Agree with oral vancomycin for C. difficile colitis  -CRP is elevated.  Patient is having improved chest CT and improved respiratory status, defer steroids at this time  -Echocardiogram pending  -Continue midodrine 5 mg 3 times daily  -Nephrology following, PD per their service  -Trend renal panel and electrolytes  -Encourage activity and incentive spirometer use    DVT prophylaxis:  Medical and mechanical DVT prophylaxis orders are present.    CODE STATUS:   Level Of Support Discussed With: Patient  Code Status (Patient has no pulse and is not breathing): CPR (Attempt to Resuscitate)  Medical Interventions (Patient has pulse or is breathing): Full Support      Labs, imaging, microbiology, notes and medications personally reviewed  Discussed with primary    Electronically signed by Scottie Valentin MD, 02/19/24, 3:17 PM EST.

## 2024-02-19 NOTE — CONSULTS
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 coli bacteremia.  He was treated with Zosyn and vancomycin.  Patient has acute watery diarrhea and C. difficile PCR was positive whereas C. difficile toxin was negative with a comment about possible colonization.  Patient is on probiotics and oral vancomycin for presumed C. difficile and a GI consult requested for further evaluation and management.    Patient describes that he has loose mushy type BM for long time, on average 2-3 times per day mostly postprandial.  About a year ago he started peritoneal dialysis with worsening of diarrhea.  For last 2 days he is noted to have acute watery diarrhea.  Yesterday he had 20 bowel movements and today since morning describes about 12 BM per day associated with urgency, incontinence and abdominal bloating.  Patient denies abdominal pain.  No nausea or vomiting.  No blood or mucus or oily secretion in the stool.  CT abdomen and pelvis in the ED with right lower lobe pneumonia, pneumoperitoneum likely due to peritoneal dialysis and nonobstructing 11 mm calculus in the right renal pelvis-unchanged.  Patient had paracentesis that has rule out SBP.  Peritoneal fluid cultures are negative so far.  He had anion gap metabolic acidosis with lactate level of 5.3 and CRP level of 49.54.  Patient was found to be positive for C. difficile PCR that subsequently corrected with C. difficile  toxin antigen negative.    This morning lab with WBC count of 14,000, hemoglobin 9.9 g/dL, platelet count 82,000.  LFTs within normal range, potassium 3.3, anion gap 15.5.  He is currently on ceftriaxone 1 g IV daily for E. coli bacteremia and vancomycin 125 mg orally every 6 hours for presumed C. difficile infection.    Past Medical History:  Past Medical History:   Diagnosis Date    Anemia     Ankle pain, right     CKD (chronic kidney disease), stage IV     Diabetes     Gout     High cholesterol     HL (hearing loss)     Hyperkalemia     Hypertension     Hypothyroidism     Psoriasis     Vitiligo      Past Surgical History:  Past Surgical History:   Procedure Laterality Date    ANKLE SURGERY  1990    APPENDECTOMY N/A     COLONOSCOPY N/A 2022    [facility]    HIP BIPOLAR REPLACEMENT Right 2000    INSERTION PERITONEAL DIALYSIS CATHETER N/A 3/27/2023    Procedure: LAPAROSCOPIC INSERTION PERITONEAL DIALYSIS CATHETER, LAPAROSCOPIC OMENTOPEXY WITH LYSIS OF ADHESIONS;  Surgeon: Jose Berry MD;  Location: [facility];  Service: General;  Laterality: N/A;    INSERTION PERITONEAL DIALYSIS CATHETER Left 2023    Procedure: REVISION OF PERITONEAL DIALYSIS CATHETER;  Surgeon: Radha Oreilly MD;  Location: [facility];  Service: General;  Laterality: Left;    RENAL BIOPSY Left 07/15/2022      Social History:   Social History     Tobacco Use    Smoking status: Former     Packs/day: 1.00     Years: 10.00     Additional pack years: 0.00     Total pack years: 10.00     Types: Cigarettes     Start date:      Quit date: 2000     Years since quittin.1     Passive exposure: Past    Smokeless tobacco: Never    Tobacco comments:     quit 25 years ago, chews nicotine gum   Substance Use Topics    Alcohol use: Yes     Comment: 1 DRINK/DAY      Family History:  Family History   Problem Relation Age of Onset    Diabetes Mother     Heart disease Father     Cancer Sister 35    Diabetes  Sister     Diabetes Brother     Heart disease Paternal Uncle     Malvan Hyperthermia Neg Hx      Home Meds:  Medications Prior to Admission   Medication Sig Dispense Refill Last Dose    allopurinol (ZYLOPRIM) 300 MG tablet Take 1 tablet by mouth Daily.   2/15/2024    ascorbic acid (VITAMIN C) 1000 MG tablet Take 1 tablet by mouth Daily.   2/15/2024    atorvastatin (LIPITOR) 40 MG tablet Take 1 tablet by mouth Daily. 90 tablet 0 2/15/2024    carvedilol (COREG) 12.5 MG tablet Take 2 tablets by mouth 2 (Two) Times a Day.   2/15/2024    Insulin Glargine (LANTUS SOLOSTAR) 100 UNIT/ML injection pen Inject 15-20 Units under the skin into the appropriate area as directed Every Night.   2/15/2024    levothyroxine (SYNTHROID, LEVOTHROID) 125 MCG tablet Take 1 tablet by mouth Daily. 90 tablet 1 2/15/2024    Omega-3 Fatty Acids (fish oil) 1000 MG capsule capsule Take 2 capsules by mouth Daily With Breakfast.   2/15/2024    Risankizumab-rzaa (SKYRIZI, 150 MG DOSE, SC) Inject  under the skin into the appropriate area as directed. Once every 3 months   Past Month    sertraline (ZOLOFT) 100 MG tablet Take 1 tablet by mouth Every Night. 90 tablet 1 2/15/2024    sevelamer (RENVELA) 800 MG tablet Take 2 tablets by mouth 2 (Two) Times a Day With Meals.   2/15/2024    torsemide (DEMADEX) 20 MG tablet Take 2 tablets by mouth Every Morning.   2/15/2024    Turmeric 500 MG capsule Take 1 capsule by mouth 2 (Two) Times a Day.   2/15/2024     Current Meds:   !Patient Home Medications Stored on Unit, , Does not apply, BID  cefTRIAXone, 1,000 mg, Intravenous, Q12H  dianeal lo-peter 1.5%, 1,500 mL, Intraperitoneal, 4 Exchanges Daily - Dwell Overnight  heparin (porcine), 5,000 Units, Subcutaneous, Q12H  Culturelle Abdominal Support, 1 dose, Oral, BID  midodrine, 10 mg, Oral, TID AC  saccharomyces boulardii, 250 mg, Oral, BID  sertraline, 100 mg, Oral, Nightly  sod bicarb-citric acid-simethicone, 1 packet, Oral, Once in imaging  sodium bicarbonate,  1,300 mg, Oral, BID  sodium chloride, 10 mL, Intravenous, Q12H  vancomycin, 125 mg, Oral, Q6H      Allergies:  No Known Allergies    Objective     Vital Signs  Temp:  [97.9 °F (36.6 °C)-98.5 °F (36.9 °C)] 97.9 °F (36.6 °C)  Heart Rate:  [68-76] 71  Resp:  [18-24] 18  BP: (129-165)/(50-98) 152/66  Physical Exam:  General Appearance:    Alert and oriented, normal affect   Head:    Normocephalic, without obvious abnormality, atraumatic   Eyes:          conjunctivae and sclerae normal, no icterus, pupils round and reactive to light   Oral cavity: Moist and intact, normal dentation   Neck:   Supple, no adenopathy   Chest Wall/Lungs:    No abnormalities observed, equal on expansion bilaterally, no use of extrarespiratory muscles noted   Abdomen:     Soft, distended, nontender; normal bowel sounds, no hepatospleenomegaly, no ascites.  Left-sided peritoneal dialysis catheter in place   Extremities:   no edema, clubbing, or cyanosis       Psychiatric:  normal mood and insight     Results Review:   I reviewed the patient's new clinical results.    Results from last 7 days   Lab Units 02/19/24  0501 02/18/24  0528 02/17/24  0230   WBC 10*3/mm3 13.99* 11.50* 9.71   HEMOGLOBIN g/dL 9.9* 9.7* 10.0*   MCV fL 94.5 93.4 96.1   PLATELETS 10*3/mm3 82* 75* 102*         Lab 02/19/24  0501 02/18/24  0528 02/17/24  0230   NEUTROS ABS 10.74* 8.97* 6.66     Results from last 7 days   Lab Units 02/19/24  0433 02/18/24  0528 02/17/24  0230   BUN mg/dL 42* 43* 44*   CREATININE mg/dL 5.54* 5.85* 6.62*   SODIUM mmol/L 136 135* 136   POTASSIUM mmol/L 3.3* 3.2* 3.7   CHLORIDE mmol/L 99 97* 99   CO2 mmol/L 21.5* 22.6 19.6*   GLUCOSE mg/dL 115* 78 162*          Results from last 7 days   Lab Units 02/19/24  0433 02/18/24  0528 02/16/24  0746   AST (SGOT) U/L 30 30 15   ALT (SGPT) U/L 15 13 9   ALK PHOS U/L 67 50 60   BILIRUBIN mg/dL 0.4 0.5 0.6   TOTAL PROTEIN g/dL 5.9* 5.5* 5.7*   ALBUMIN g/dL 2.5* 2.6* 3.0*                Radiology:  CT Abdomen  Pelvis Without Contrast    Result Date: 2/16/2024    1. Extensive right lower lobe pneumonia. 2. Pneumoperitoneum similar to prior chest CT likely related to peritoneal dialysis catheter.  No organized fluid collection or abscess. 3. Nonobstructing 11 mm calculus at the right renal pelvis unchanged. 4. Additional chronic findings above.     LISY SANTILLAN MD       Electronically Signed and Approved By: LISY SANTILLAN MD on 2/16/2024 at 14:46             XR Chest 1 View    Result Date: 2/16/2024   Right lower lobe airspace opacity consistent with pneumonia.  Pneumoperitoneum, better seen on same day CT of the abdomen/pelvis.       JOSH HAMPTON MD       Electronically Signed and Approved By: JOSH HAMPTON MD on 2/16/2024 at 14:30             XR Chest 1 View    Result Date: 2/16/2024    No acute process.       LISY SANTILLAN MD       Electronically Signed and Approved By: LISY SANTILLAN MD on 2/16/2024 at 8:07              Impression:     Acute diarrhea  Abdominal bloating  Thrombocytopenia  Normocytic anemia  Recent E. 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  C. difficile PCR positive with negative toxin.  Will check EIA, GI panel, fecal lactoferrin, fecal calprotectin, pancreatic elastase, ESR and CRP, TTG and IgA, B12 and folate level.  Patient is chronically immunocompromised and will check HSV and CMV once above workup is negative.  The likely differential diagnoses included infectious versus inflammatory versus secretory versus microscopic colitis versus SIBO.      Will discontinue probiotics and start him on Xifaxan 550 mg orally 3 times daily.  Once all the lab workup is back except fecal calprotectin, may add Imodium.  If in the next 48 hours no improvement in his  symptoms we will plan for performing EGD and colonoscopy with biopsies      I discussed the patients findings and my recommendations with patient, family, and nursing staff.      Electronically signed by Gonzalez Marroquin MD, 02/19/24, 3:09 PM EST.

## 2024-02-19 NOTE — PLAN OF CARE
Goal Outcome Evaluation:            VSS. Stool sample being sent down.  Gastro was consulted.  IV antibiotics continued.  NO acute changes this shift.  No signs of distress noted at this time.

## 2024-02-19 NOTE — THERAPY EVALUATION
Patient Name: Nelson Wang  : 1945    MRN: [id number]                              Today's Date: 2024       Admit Date: 2024    Visit Dx:     ICD-10-CM ICD-9-CM   1. Febrile illness  R50.9 780.60   2. Weakness  R53.1 780.79   3. End stage renal disease  N18.6 585.6   4. Sepsis, due to unspecified organism, unspecified whether acute organ dysfunction present  A41.9 038.9     995.91   5. Dysphagia, unspecified type  R13.10 787.20     Patient Active Problem List   Diagnosis    Essential hypertension    Bradycardia, sinus    Anemia due to chronic kidney disease    Hypothyroidism    Idiopathic gout    Proteinuria    Psoriasis    Thrombocytopenia    Type 2 diabetes mellitus without complication    CKD (chronic kidney disease), stage IV    Hyperlipidemia    Monoclonal gammopathy of unknown significance (MGUS)    Stage 5 chronic kidney disease    Peritoneal dialysis catheter in place    Chronic gout without tophus    Peritoneal dialysis catheter dysfunction    Medicare annual wellness visit, subsequent    Chronic cough    Peritonitis     Past Medical History:   Diagnosis Date    Anemia     Ankle pain, right     CKD (chronic kidney disease), stage IV     Diabetes     Gout     High cholesterol     HL (hearing loss)     Hyperkalemia     Hypertension     Hypothyroidism     Psoriasis     Vitiligo      Past Surgical History:   Procedure Laterality Date    ANKLE SURGERY  1990    APPENDECTOMY N/A     COLONOSCOPY N/A 2022    [facility]    HIP BIPOLAR REPLACEMENT Right 2000    INSERTION PERITONEAL DIALYSIS CATHETER N/A 3/27/2023    Procedure: LAPAROSCOPIC INSERTION PERITONEAL DIALYSIS CATHETER, LAPAROSCOPIC OMENTOPEXY WITH LYSIS OF ADHESIONS;  Surgeon: Jose Berry MD;  Location: [facility];  Service: General;  Laterality: N/A;    INSERTION PERITONEAL DIALYSIS CATHETER Left 2023    Procedure: REVISION OF PERITONEAL DIALYSIS CATHETER;  Surgeon: Radha Oreilly MD;   Location: [facility] MAIN OR;  Service: General;  Laterality: Left;    RENAL BIOPSY Left 07/15/2022      General Information       Row Name 02/19/24 0851          OT Time and Intention    Document Type evaluation  -PG     Mode of Treatment individual therapy;occupational therapy  -PG       Row Name 02/19/24 0851          General Information    Patient Profile Reviewed yes  Patient lives with spouse and her daughter.  Independent with all ADLs transfers previously.  No assistive device used.  Patient driving  -PG     Prior Level of Function independent:;transfer;driving;ADL's  -PG     Existing Precautions/Restrictions no known precautions/restrictions  -PG     Barriers to Rehab none identified  -PG       Row Name 02/19/24 0851          Occupational Profile    Reason for Services/Referral (Occupational Profile) Patient is a pleasant 79-year-old male admitted for generalized weakness, sepsis, pneumonia.  No previous OT services identified.  Patient is being evaluated by Occupational Therapy due to recent decline in ADL function.  -PG       Row Name 02/19/24 0851          Living Environment    People in Home spouse  -PG       Row Name 02/19/24 0851          Cognition    Orientation Status (Cognition) oriented x 3  -PG       Row Name 02/19/24 0851          Safety Issues, Functional Mobility    Impairments Affecting Function (Mobility) endurance/activity tolerance;strength  -PG               User Key  (r) = Recorded By, (t) = Taken By, (c) = Cosigned By      Initials Name Provider Type    PG Chauncey Goldberg, JOCELYN Occupational Therapist                     Mobility/ADL's       Row Name 02/19/24 0852          Transfers    Transfers sit-stand transfer;stand-sit transfer  -PG       Row Name 02/19/24 0852          Sit-Stand Transfer    Sit-Stand [place] (Transfers) standby assist  -PG       Row Name 02/19/24 0852          Stand-Sit Transfer    Stand-Sit [place] (Transfers) standby assist  -PG       Row Name 02/19/24 0852           Activities of Daily Living    BADL Assessment/Intervention bathing;upper body dressing;toileting;grooming;lower body dressing  -PG       Row Name 02/19/24 0852          Bathing Assessment/Intervention    [place] (Bathing) bathing skills;standby assist  -PG       Row Name 02/19/24 0852          Upper Body Dressing Assessment/Training    [place] (Upper Body Dressing) upper body dressing skills;standby assist  -PG       Row Name 02/19/24 0852          Lower Body Dressing Assessment/Training    [place] (Lower Body Dressing) lower body dressing skills;standby assist  -PG       Row Name 02/19/24 0852          Toileting Assessment/Training    [place] (Toileting) toileting skills;standby assist  -PG       Row Name 02/19/24 0852          Grooming Assessment/Training    [place] (Grooming) grooming skills;standby assist  -PG               User Key  (r) = Recorded By, (t) = Taken By, (c) = Cosigned By      Initials Name Provider Type    Chauncey Vizcarra OT Occupational Therapist                   Obj/Interventions       Row Name 02/19/24 0853          Sensory Assessment (Somatosensory)    Sensory Assessment (Somatosensory) sensation intact  -PG       Row Name 02/19/24 0853          Vision Assessment/Intervention    Visual Impairment/Limitations WFL  -PG       Row Name 02/19/24 0853          Range of Motion Comprehensive    General Range of Motion no range of motion deficits identified  -PG       Row Name 02/19/24 0853          Strength Comprehensive (MMT)    General Manual Muscle Testing (MMT) Assessment no strength deficits identified  -PG       Row Name 02/19/24 0853          Motor Skills    Motor Skills coordination;functional endurance  -PG     Coordination WFL  -PG     Functional Endurance Fair  -PG               User Key  (r) = Recorded By, (t) = Taken By, (c) = Cosigned By      Initials Name Provider Type    Chauncey Vizcarra OT Occupational Therapist                    Goals/Plan       Row Name 02/19/24 0854          Transfer Goal 1 (OT)    Activity/Assistive Device (Transfer Goal 1, OT) transfers, all  -PG     [place] Level/Cues Needed (Transfer Goal 1, OT) independent  -PG     Time Frame (Transfer Goal 1, OT) long term goal (LTG);10 days  -PG       Row Name 02/19/24 0854          Bathing Goal 1 (OT)    Activity/Device (Bathing Goal 1, OT) bathing skills, all  -PG     [place] Level/Cues Needed (Bathing Goal 1, OT) independent  -PG     Time Frame (Bathing Goal 1, OT) long term goal (LTG);10 days  -PG       Row Name 02/19/24 0854          Dressing Goal 1 (OT)    Activity/Device (Dressing Goal 1, OT) dressing skills, all  -PG     [place]/Cues Needed (Dressing Goal 1, OT) independent  -PG     Time Frame (Dressing Goal 1, OT) long term goal (LTG);10 days  -PG       Row Name 02/19/24 0854          Toileting Goal 1 (OT)    Activity/Device (Toileting Goal 1, OT) toileting skills, all  -PG     [place] Level/Cues Needed (Toileting Goal 1, OT) independent  -PG     Time Frame (Toileting Goal 1, OT) long term goal (LTG);10 days  -PG       Row Name 02/19/24 0854          Grooming Goal 1 (OT)    Activity/Device (Grooming Goal 1, OT) grooming skills, all  -PG     [place] (Grooming Goal 1, OT) independent  -PG     Time Frame (Grooming Goal 1, OT) long term goal (LTG);10 days  -PG       Row Name 02/19/24 0854          Problem Specific Goal 1 (OT)    Problem Specific Goal 1 (OT) Patient will improve activity tolerance to good minus to support independence and engagement with ADL activities  -PG     Time Frame (Problem Specific Goal 1, OT) long term goal (LTG);10 days  -PG       Row Name 02/19/24 0854          Therapy Assessment/Plan (OT)    Planned Therapy Interventions (OT) activity tolerance training;BADL retraining;strengthening exercise;transfer/mobility retraining;patient/caregiver education/training;occupation/activity based interventions  -PG                User Key  (r) = Recorded By, (t) = Taken By, (c) = Cosigned By      Initials Name Provider Type    PG Chauncey Goldberg OT Occupational Therapist                   Clinical Impression       Row Name 02/19/24 0854          Pain Assessment    Pretreatment Pain Rating 0/10 - no pain  -PG     Posttreatment Pain Rating 0/10 - no pain  -PG       Row Name 02/19/24 0854          Plan of Care Review    Plan of Care Reviewed With patient  -PG     Progress no change  -PG     Outcome Evaluation Patient presents with limitations affecting strength, activity tolerance, and balance impacting patient's ability to return home safely and independently.  The skills of a therapist will be required to safely and effectively implement the following treatment plan to restore maximal level of function  -PG       Row Name 02/19/24 0854          Therapy Assessment/Plan (OT)    Patient/Family Therapy Goal Statement (OT) Get stronger and return home independently  -PG     Rehab Potential (OT) good, to achieve stated therapy goals  -PG     Criteria for Skilled Therapeutic Interventions Met (OT) yes;meets criteria;skilled treatment is necessary  -PG     Therapy Frequency (OT) 5 times/wk  -PG       Row Name 02/19/24 0864          Therapy Plan Review/Discharge Plan (OT)    Anticipated Discharge Disposition (OT) home with outpatient therapy services  -PG               User Key  (r) = Recorded By, (t) = Taken By, (c) = Cosigned By      Initials Name Provider Type    PG Chauncey Goldberg OT Occupational Therapist                   Outcome Measures       Row Name 02/19/24 0806          How much help from another is currently needed...    Putting on and taking off regular lower body clothing? 3  -PG     Bathing (including washing, rinsing, and drying) 3  -PG     Toileting (which includes using toilet bed pan or urinal) 4  -PG     Putting on and taking off regular upper body clothing 4  -PG     Taking care of personal grooming (such as brushing teeth) 4  -PG      Eating meals 4  -PG     AM-PAC 6 Clicks Score (OT) 22  -PG       Row Name 02/19/24 0855          Functional Assessment    Outcome Measure Options AM-PAC 6 Clicks Daily Activity (OT);Optimal Instrument  -PG       Row Name 02/19/24 0855          Optimal Instrument    Optimal Instrument Optimal - 3  -PG     Bending/Stooping 2  -PG     Standing 1  -PG     Reaching 1  -PG     From the list, choose the 3 activities you would most like to be able to do without any difficulty Bending/stooping;Standing;Reaching  -PG     Total Score Optimal - 3 4  -PG               User Key  (r) = Recorded By, (t) = Taken By, (c) = Cosigned By      Initials Name Provider Type    PG Chauncey Goldberg OT Occupational Therapist                    Occupational Therapy Education       Title: PT OT SLP Therapies (Done)       Topic: Occupational Therapy (Done)       Point: ADL training (Done)       Description:   Instruct learner(s) on proper safety adaptation and remediation techniques during self care or transfers.   Instruct in proper use of assistive devices.                  Learning Progress Summary             Patient Acceptance, E,D, DU by PG at 2/19/2024 0856                         Point: Home exercise program (Done)       Description:   Instruct learner(s) on appropriate technique for monitoring, assisting and/or progressing therapeutic exercises/activities.                  Learning Progress Summary             Patient Acceptance, E,D, DU by PG at 2/19/2024 0856                         Point: Precautions (Done)       Description:   Instruct learner(s) on prescribed precautions during self-care and functional transfers.                  Learning Progress Summary             Patient Acceptance, E,D, DU by PG at 2/19/2024 0856                         Point: Body mechanics (Done)       Description:   Instruct learner(s) on proper positioning and spine alignment during self-care, functional mobility activities and/or exercises.                   Learning Progress Summary             Patient Acceptance, E,D, DU by PG at 2/19/2024 0856                                         User Key       Initials Effective Dates Name Provider Type Discipline    PG 06/16/21 -  Chauncey Goldberg OT Occupational Therapist OT                  OT Recommendation and Plan  Planned Therapy Interventions (OT): activity tolerance training, BADL retraining, strengthening exercise, transfer/mobility retraining, patient/caregiver education/training, occupation/activity based interventions  Therapy Frequency (OT): 5 times/wk  Plan of Care Review  Plan of Care Reviewed With: patient  Progress: no change  Outcome Evaluation: Patient presents with limitations affecting strength, activity tolerance, and balance impacting patient's ability to return home safely and independently.  The skills of a therapist will be required to safely and effectively implement the following treatment plan to restore maximal level of function     Time Calculation:   Evaluation Complexity (OT)  Review Occupational Profile/Medical/Therapy History Complexity: brief/low complexity  Assessment, Occupational Performance/Identification of Deficit Complexity: 3-5 performance deficits  Clinical Decision Making Complexity (OT): problem focused assessment/low complexity  Overall Complexity of Evaluation (OT): low complexity     Time Calculation- OT       Row Name 02/19/24 0856             Time Calculation- OT    OT Start Time 0857  -PG      OT Received On 02/19/24  -PG      OT Goal Re-Cert Due Date 02/28/24  -PG         Untimed Charges    OT Eval/Re-eval Minutes 35  -PG         Total Minutes    Untimed Charges Total Minutes 35  -PG       Total Minutes 35  -PG                User Key  (r) = Recorded By, (t) = Taken By, (c) = Cosigned By      Initials Name Provider Type    PG Chauncey Goldberg OT Occupational Therapist                  Therapy Charges for Today       Code Description Service Date Service Provider Modifiers  Qty    [id number]  OT EVAL LOW COMPLEXITY 3 2/19/2024 Chauncey Goldberg, OT GO 1                 Chauncey Goldberg OT  2/19/2024

## 2024-02-19 NOTE — CASE MANAGEMENT/SOCIAL WORK
Discharge Planning Assessment   Nate     Patient Name: Nelson Wang  MRN: [id number]  Today's Date: 2/19/2024    Admit Date: 2/16/2024    Plan: Pt lives home with wife. Pt usually indpendent at home. PCP: [facility], Pharm: [facility]. Pt has good support from family and friends. Pt does PD at home, Pt denies any fin.stressors at this time. SW will continue to follow for needs.   Discharge Needs Assessment       Row Name 02/19/24 1056       Living Environment    People in Home spouse    Current Living Arrangements home    Potentially Unsafe Housing Conditions none    In the past 12 months has the electric, gas, oil, or water company threatened to shut off services in your home? No    Primary Care Provided by self    Provides Primary Care For no one    Family Caregiver if Needed child(laura), adult;spouse    Quality of Family Relationships helpful;involved;supportive    Able to Return to Prior Arrangements yes       Resource/Environmental Concerns    Resource/Environmental Concerns none    Transportation Concerns none       Transportation Needs    In the past 12 months, has lack of transportation kept you from medical appointments or from getting medications? no    In the past 12 months, has lack of transportation kept you from meetings, work, or from getting things needed for daily living? No       Food Insecurity    Within the past 12 months, you worried that your food would run out before you got the money to buy more. Never true    Within the past 12 months, the food you bought just didn't last and you didn't have money to get more. Never true       Transition Planning    Patient/Family Anticipates Transition to home with family    Patient/Family Anticipated Services at Transition none    Transportation Anticipated family or friend will provide       Discharge Needs Assessment    Readmission Within the Last 30 Days no previous admission in last 30 days    Equipment Currently Used at Home other (see comments)     Anticipated Changes Related to Illness none    Discharge Coordination/Progress Pt lives home with wife. Pt usually indpendent at home. PCP: [facility], Pharm: [facility]. Pt has good support from family and friends. Pt does PD at home, Pt denies any fin.stressors at this time. SW will continue to follow for needs.                   Discharge Plan       Row Name 02/19/24 1058       Plan    Plan Pt lives home with wife. Pt usually indpendent at home. PCP: [facility], Pharm: [facility]. Pt has good support from family and friends. Pt does PD at home, Pt denies any fin.stressors at this time. SW will continue to follow for needs.                  Continued Care and Services - Admitted Since 2/16/2024    Coordination has not been started for this encounter.          Demographic Summary       Row Name 02/19/24 1054       General Information    Admission Type inpatient    Arrived From emergency department    Referral Source admission list    Reason for Consult discharge planning    Preferred Language English       Contact Information    Permission Granted to Share Info With lay caregiver    Contact Information Obtained for lay caregiver       Lay Caregiver Information    Name, Lay Caregiver Jose Wang    Phone, Lay Caregiver [phone]                   Functional Status       Row Name 02/19/24 1055       Functional Status    Usual Activity Tolerance good    Current Activity Tolerance good       Physical Activity    On average, how many days per week do you engage in moderate to strenuous exercise (like a brisk walk)? 0 days    On average, how many minutes do you engage in exercise at this level? 0 min    Number of minutes of exercise per week 0       Assessment of Health Literacy    How often do you have someone help you read hospital materials? Never    How often do you have problems learning about your medical condition because of difficulty understanding written information? Never    How often do you have a problem  understanding what is told to you about your medical condition? Never    How confident are you filling out medical forms by yourself? Quite a bit    Health Literacy Good       Functional Status, IADL    Medications independent    Meal Preparation independent    Housekeeping independent    Laundry independent    Shopping independent       Mental Status    General Appearance WDL WDL       Mental Status Summary    Recent Changes in Mental Status/Cognitive Functioning no changes       Employment/    Employment Status retired                   Psychosocial    No documentation.                  Abuse/Neglect    No documentation.                  Legal       Row Name 02/19/24 1052       Financial Resource Strain    How hard is it for you to pay for the very basics like food, housing, medical care, and heating? Not hard       Financial/Legal    Source of Income social security    Application for Public Assistance not applied       Legal    Criminal Activity/Legal Involvement none                   Substance Abuse    No documentation.                  Patient Forms    No documentation.                     Janna Velasquez

## 2024-02-19 NOTE — PROGRESS NOTES
[facility]   Hospitalist Progress Note  Date: 2024  Patient Name: Nelson Wang  : 1945  MRN: [id number]  Date of admission: 2024      Subjective   Subjective     Chief complaint: Fever and weakness    Summary:  79-year-old male with history of ESRD on peritoneal dialysis, hypertension, diabetes mellitus, dyslipidemia, hypothyroidism, hyperuricemia/gout, vitiligo, hospitalized on 2024 with chief complaint of fevers and weakness, found to be in septic shock due to E. coli bacteremia and right lower lobe pneumonia, with C. difficile positive stools, with concern for acute peritonitis, placed on broad-spectrum antibiotics, nephrology and critical care consulted, required Levophed, central line placed, Levophed weaned, transferred out of the ICU after starting midodrine, C. difficile positive stools, vancomycin orally started, GI consulted with persistent ongoing diarrhea.  Speech therapy consulted with concern for aspiration, modified barium swallow indicated laryngeal penetration and silent aspiration with thin liquids, recommended nectar thick solids, no straws    Interval follow-up: Seen and examined this morning, was in no acute distress, no acute major overnight events but did complain of ongoing significant diarrhea, every couple minutes he has watery stools despite starting oral vancomycin for the past 3 days for C. difficile positive stools.  GI consulted for further evaluation.  Rifaximin ordered with underlying history of IBS now with acute watery diarrhea and C. difficile positive, in hopes that it helps.  Remains on ceftriaxone unfortunately due to his underlying bacteremia.  Blood pressures are better with midodrine, rather elevated today, reducing midodrine dose to 5 mg as patient systolics reaching the 150s to 160s.  He is tolerating fluid exchanges.  Telemetry reviewed, no acute major rhythmic events, normal sinus rhythm in the 60s.  White blood cell count still up to  13,000, hemoglobin 9.9, CRP down to 23, sed rate still elevated at 67, creatinine 5.54, potassium 3.3, sodium 136.    Review of systems:  All systems reviewed negative except for ongoing diarrhea, transweakness, generalized fatigue    Objective   Objective     Vitals:   Temp:  [97.9 °F (36.6 °C)-98.5 °F (36.9 °C)] 97.9 °F (36.6 °C)  Heart Rate:  [68-76] 74  Resp:  [18-24] 18  BP: (129-165)/(50-81) 153/81  Physical Exam    Constitutional: Awake, alert, no acute distress sitting upright in the bed undergoing PD fluid exchange   Eyes: Pupils equal, sclerae anicteric, no conjunctival injection   HENT: NCAT, mucous membranes dry   Neck: Supple, no thyromegaly, no lymphadenopathy, trachea midline   Respiratory: Diminished coarse breath sounds mostly on the right side lower lobe   Cardiovascular: RRR, no murmurs, rubs, or gallops, palpable pedal pulses bilaterally   Gastrointestinal: Positive bowel sounds, soft, nontender, distended, PD catheter in place   Musculoskeletal: No bilateral ankle edema, no clubbing or cyanosis to extremities   Psychiatric: Appropriate affect, cooperative   Neurologic: Oriented x 3, strength symmetric in all extremities, Cranial Nerves grossly intact to confrontation, speech clear   Skin: No rashes visible on exposed skin      Result Review    Result Review:  I have personally reviewed the pertinent results from the past 24 hours to 2/19/2024 17:31 EST and agree with these findings:  [x]  Laboratory   CBC          2/17/2024    02:30 2/18/2024    05:28 2/19/2024    05:01   CBC   WBC 9.71  11.50  13.99    RBC 3.34  3.20  3.25    Hemoglobin 10.0  9.7  9.9    Hematocrit 32.1  29.9  30.7    MCV 96.1  93.4  94.5    MCH 29.9  30.3  30.5    MCHC 31.2  32.4  32.2    RDW 15.1  14.9  15.1    Platelets 102  75  82      BMP          2/17/2024    02:30 2/18/2024    05:28 2/19/2024    04:33   BMP   BUN 44  43  42    Creatinine 6.62  5.85  5.54    Sodium 136  135  136    Potassium 3.7  3.2  3.3    Chloride 99   97  99    CO2 19.6  22.6  21.5    Calcium 7.7  8.6  8.9      LIVER FUNCTION TESTS:      Lab 02/19/24  0433 02/18/24  0528 02/16/24  0746   TOTAL PROTEIN 5.9* 5.5* 5.7*   ALBUMIN 2.5* 2.6* 3.0*   GLOBULIN 3.4 2.9 2.7   ALT (SGPT) 15 13 9   AST (SGOT) 30 30 15   BILIRUBIN 0.4 0.5 0.6   ALK PHOS 67 50 60       [x]  Microbiology   Microbiology Results (last 10 days)       Procedure Component Value - Date/Time    Blood Culture - Blood, Hand, Right [[id number]]  (Normal) Collected: 02/18/24 1441    Lab Status: Preliminary result Specimen: Blood from Hand, Right Updated: 02/19/24 1515     Blood Culture No growth at 24 hours    Blood Culture - Blood, Neck [[id number]]  (Normal) Collected: 02/18/24 1440    Lab Status: Preliminary result Specimen: Blood from Neck Updated: 02/19/24 1515     Blood Culture No growth at 24 hours    Clostridioides difficile Toxin - Stool, Per Rectum [[id number]]  (Abnormal) Collected: 02/16/24 2010    Lab Status: Final result Specimen: Stool from Per Rectum Updated: 02/16/24 2138    Narrative:      The following orders were created for panel order Clostridioides difficile Toxin - Stool, Per Rectum.  Procedure                               Abnormality         Status                     ---------                               -----------         ------                     Clostridioides difficile...[[id number]]  Abnormal            Final result                 Please view results for these tests on the individual orders.    Clostridioides difficile Toxin, PCR - Stool, Per Rectum [[id number]]  (Abnormal) Collected: 02/16/24 2010    Lab Status: Final result Specimen: Stool from Per Rectum Updated: 02/16/24 2138     Toxigenic C. difficile by PCR Positive     027 Toxin Presumptive Negative    Narrative:      DNA from a toxigenic strain of C.difficile has been detected. Antigen testing for the presence of free C.difficile toxin is currently in progress, to help determine the clinical significance of this PCR  result.     Enteric Bacterial Panel - Stool, Per Rectum [[id number]]  (Normal) Collected: 02/16/24 2010    Lab Status: Final result Specimen: Stool from Per Rectum Updated: 02/17/24 1248     Salmonella Not Detected     Campylobacter Not Detected     Shigella/Enteroinvasive E. coli (EIEC) Not Detected     Shiga-like toxin-producing E. coli (STEC) stx1/stx2 Not Detected    Clostridioides difficile toxin Ag, Reflex - Stool, Per Rectum [[id number]]  (Normal) Collected: 02/16/24 2010    Lab Status: Final result Specimen: Stool from Per Rectum Updated: 02/16/24 2139     C.diff Toxin Ag Negative    Narrative:      DNA from a toxigenic strain of C.difficile was detected, although the free toxin itself was not detected. These findings are consistent with C.difficile colonization and may not reflect actual C.difficile infection. Clinical correlation needed.    MRSA Screen, PCR (Inpatient) - Swab, Nares [[id number]]  (Normal) Collected: 02/16/24 1447    Lab Status: Final result Specimen: Swab from Nares Updated: 02/16/24 1635     MRSA PCR No MRSA Detected    Narrative:      The negative predictive value of this diagnostic test is high and should only be used to consider de-escalating anti-MRSA therapy. A positive result may indicate colonization with MRSA and must be correlated clinically.    Respiratory Panel PCR w/COVID-19(SARS-CoV-2) RA/TANIA/GAVIN/[facility] In-House, NP Swab in UTM/VTM, 2 HR TAT - Swab, Nasopharynx [[id number]]  (Normal) Collected: 02/16/24 1447    Lab Status: Final result Specimen: Swab from Nasopharynx Updated: 02/16/24 1622     ADENOVIRUS, PCR Not Detected     Coronavirus 229E Not Detected     Coronavirus HKU1 Not Detected     Coronavirus NL63 Not Detected     Coronavirus OC43 Not Detected     COVID19 Not Detected     Human Metapneumovirus Not Detected     Human Rhinovirus/Enterovirus Not Detected     Influenza A PCR Not Detected     Influenza B PCR Not Detected     Parainfluenza Virus 1 Not Detected      Parainfluenza Virus 2 Not Detected     Parainfluenza Virus 3 Not Detected     Parainfluenza Virus 4 Not Detected     RSV, PCR Not Detected     Bordetella pertussis pcr Not Detected     Bordetella parapertussis PCR Not Detected     Chlamydophila pneumoniae PCR Not Detected     Mycoplasma pneumo by PCR Not Detected    Narrative:      In the setting of a positive respiratory panel with a viral infection PLUS a negative procalcitonin without other underlying concern for bacterial infection, consider observing off antibiotics or discontinuation of antibiotics and continue supportive care. If the respiratory panel is positive for atypical bacterial infection (Bordetella pertussis, Chlamydophila pneumoniae, or Mycoplasma pneumoniae), consider antibiotic de-escalation to target atypical bacterial infection.    Legionella Antigen, Urine - Urine, Urine, Clean Catch [[id number]]  (Normal) Collected: 02/16/24 1127    Lab Status: Final result Specimen: Urine, Clean Catch Updated: 02/16/24 1322     LEGIONELLA ANTIGEN, URINE Negative    S. Pneumo Ag Urine or CSF - Urine, Urine, Clean Catch [[id number]]  (Normal) Collected: 02/16/24 1127    Lab Status: Final result Specimen: Urine, Clean Catch Updated: 02/16/24 1322     Strep Pneumo Ag Negative    Body Fluid Culture - Body Fluid, Peritoneum [[id number]] Collected: 02/16/24 1030    Lab Status: Final result Specimen: Body Fluid from Peritoneum Updated: 02/19/24 0852     Body Fluid Culture No growth at 3 days     Gram Stain No WBCs seen      No organisms seen    Blood Culture - Blood, Arm, Left [[id number]]  (Abnormal) Collected: 02/16/24 0937    Lab Status: Final result Specimen: Blood from Arm, Left Updated: 02/18/24 0922     Blood Culture Escherichia coli     Isolated from Aerobic and Anaerobic Bottles     Gram Stain Anaerobic Bottle Gram negative bacilli      Aerobic Bottle Gram negative bacilli    Narrative:      Refer to previous blood culture collected on 2/16 for andreas's       COVID-19, FLU A/B, RSV PCR 1 HR TAT - Swab, Nasopharynx [[id number]]  (Normal) Collected: 02/16/24 0753    Lab Status: Final result Specimen: Swab from Nasopharynx Updated: 02/16/24 0833     COVID19 Not Detected     Influenza A PCR Not Detected     Influenza B PCR Not Detected     RSV, PCR Not Detected    Narrative:      Fact sheet for providers: https://www.fda.gov/media/721100/download    Fact sheet for patients: https://www.fda.gov/media/971910/download    Test performed by PCR.    Blood Culture - Blood, Arm, Right [[id number]]  (Abnormal)  (Susceptibility) Collected: 02/16/24 0746    Lab Status: Final result Specimen: Blood from Arm, Right Updated: 02/18/24 0922     Blood Culture Escherichia coli     Isolated from Aerobic and Anaerobic Bottles     Gram Stain Aerobic Bottle Gram negative bacilli      Anaerobic Bottle Gram negative bacilli    Susceptibility        Escherichia coli      RITU      Amoxicillin + Clavulanate Susceptible      Ampicillin Resistant      Ampicillin + Sulbactam Resistant      Cefepime Susceptible      Ceftazidime Susceptible      Ceftriaxone Susceptible      Gentamicin Susceptible      Levofloxacin Resistant      Piperacillin + Tazobactam Susceptible      Trimethoprim + Sulfamethoxazole Susceptible                       Susceptibility Comments       Escherichia coli    Cefazolin sensitivity will not be reported for Enterobacteriaceae in non-urine isolates. If cefazolin is preferred, please call the microbiology lab to request an E-test.  With the exception of urinary-sourced infections, aminoglycosides should not be used as monotherapy.               Blood Culture ID, PCR - Blood, Arm, Right [[id number]]  (Abnormal) Collected: 02/16/24 0746    Lab Status: Final result Specimen: Blood from Arm, Right Updated: 02/16/24 2146     BCID, PCR Escherichia coli. Identification by BCID2 PCR.     BOTTLE TYPE Aerobic Bottle    Narrative:      No resistance genes detected.              [x]  [facility] [place]  Esophagram w Modified Barium Swallow Single Contrast    Result Date: 2/19/2024  1. Presbyesophagus 2. Prominent cricopharyngeal bar 3. Laryngeal penetration and silent tracheal aspiration with thin liquid barium     HUYEN SANDHU       Electronically Signed and Approved By: Angel Luis Barrientos M.D. on 2/19/2024 at 17:24             CT Abdomen Pelvis Without Contrast    Result Date: 2/16/2024    1. Extensive right lower lobe pneumonia. 2. Pneumoperitoneum similar to prior chest CT likely related to peritoneal dialysis catheter.  No organized fluid collection or abscess. 3. Nonobstructing 11 mm calculus at the right renal pelvis unchanged. 4. Additional chronic findings above.     LISY SANTILLAN MD       Electronically Signed and Approved By: LISY SANTILLAN MD on 2/16/2024 at 14:46             XR Chest 1 View    Result Date: 2/16/2024   Right lower lobe airspace opacity consistent with pneumonia.  Pneumoperitoneum, better seen on same day CT of the abdomen/pelvis.       JOSH HAMPTON MD       Electronically Signed and Approved By: JOSH HAMPTON MD on 2/16/2024 at 14:30             XR Chest 1 View    Result Date: 2/16/2024    No acute process.       LISY SANTILLAN MD       Electronically Signed and Approved By: LISY SANTILLAN MD on 2/16/2024 at 8:07                [x]  EKG/Telemetry   No orders to display       [x]  Cardiology/Vascular   []  Pathology  [x]  Old records  []  Other:    Assessment & Plan   Assessment / Plan     Assessment/Plan:  Assessment:  Septic shock due to E. coli bacteremia  E. coli bacteremia  Right lower lobe pneumonia likely due to E. coli likely due to silent aspiration  Psoriasis on chronic immunosuppression  C. difficile colitis and diarrhea  Acute peritonitis  Anion gap metabolic acidosis  Lactic acidosis  ESRD on PD  Hypokalemia  Diabetes mellitus with insulin use  Essential hypertension  Dyslipidemia  Hypothyroidism  Hyperuricemia/gout  Anemia of chronic kidney disease  Thrombocytopenia    Plan:  Labs  and imaging reviewed  Change midodrine to 5 mg 3 times daily watching blood pressures closely  Continue vancomycin oral 125 mg every 6 hours  GI consulted Dr. Marroquin, recommendations appreciated; started rifaximin  Check TSH and free T4  Resume levothyroxine 125 mcg daily  Check stool parasitic and bacterial PCR  Continue ceftriaxone 1 g daily IV  Renal diet nectar thick liquids after modified barium swallow today  Zofran for nausea  Melatonin at night to help with sleep  Prophylactic nystatin swish and swallow  Sodium bicarb tabs 1300 mg twice a day  Dialysis fluid exchange per nephrology  Pulmonary consulted discussed with Dr. Valentin, recommendations appreciated; follow-up repeat scan in outpatient  Continue telemetry monitoring  A.m. labs  Full code  PT/OT  DVT prophylaxis with heparin  Discussed with nurse at the bedside      DVT prophylaxis:  Medical and mechanical DVT prophylaxis orders are present.        CODE STATUS:   Level Of Support Discussed With: Patient  Code Status (Patient has no pulse and is not breathing): CPR (Attempt to Resuscitate)  Medical Interventions (Patient has pulse or is breathing): Full Support        Electronically signed by Saúl Vargas MD, 02/19/24, 5:31 PM EST.    Portions of this documentation were transcribed electronically from a voice recognition software.  I confirm all data accurately represents the service(s) I performed at today's visit.

## 2024-02-20 LAB
ALBUMIN SERPL-MCNC: 2.5 G/DL (ref 3.5–5.2)
ALBUMIN SERPL-MCNC: 2.5 G/DL (ref 3.5–5.2)
ALBUMIN/GLOB SERPL: 0.8 G/DL
ALP SERPL-CCNC: 59 U/L (ref 39–117)
ALP SERPL-CCNC: 66 U/L (ref 39–117)
ALT SERPL W P-5'-P-CCNC: 14 U/L (ref 1–41)
ALT SERPL W P-5'-P-CCNC: 16 U/L (ref 1–41)
ANION GAP SERPL CALCULATED.3IONS-SCNC: 13.1 MMOL/L (ref 5–15)
ANION GAP SERPL CALCULATED.3IONS-SCNC: 13.5 MMOL/L (ref 5–15)
AST SERPL-CCNC: 31 U/L (ref 1–40)
AST SERPL-CCNC: 38 U/L (ref 1–40)
BASOPHILS # BLD AUTO: 0.05 10*3/MM3 (ref 0–0.2)
BASOPHILS NFR BLD AUTO: 0.6 % (ref 0–1.5)
BILIRUB CONJ SERPL-MCNC: 0.2 MG/DL (ref 0–0.3)
BILIRUB INDIRECT SERPL-MCNC: 0.1 MG/DL
BILIRUB SERPL-MCNC: 0.3 MG/DL (ref 0–1.2)
BILIRUB SERPL-MCNC: 0.3 MG/DL (ref 0–1.2)
BUN SERPL-MCNC: 34 MG/DL (ref 8–23)
BUN SERPL-MCNC: 36 MG/DL (ref 8–23)
BUN/CREAT SERPL: 6.8 (ref 7–25)
BUN/CREAT SERPL: 6.9 (ref 7–25)
CALCIUM SPEC-SCNC: 8.6 MG/DL (ref 8.6–10.5)
CALCIUM SPEC-SCNC: 8.8 MG/DL (ref 8.6–10.5)
CHLORIDE SERPL-SCNC: 100 MMOL/L (ref 98–107)
CHLORIDE SERPL-SCNC: 101 MMOL/L (ref 98–107)
CO2 SERPL-SCNC: 22.5 MMOL/L (ref 22–29)
CO2 SERPL-SCNC: 22.9 MMOL/L (ref 22–29)
CREAT SERPL-MCNC: 5.02 MG/DL (ref 0.76–1.27)
CREAT SERPL-MCNC: 5.24 MG/DL (ref 0.76–1.27)
CRP SERPL-MCNC: 18.23 MG/DL (ref 0–0.5)
DEPRECATED RDW RBC AUTO: 52.4 FL (ref 37–54)
EGFRCR SERPLBLD CKD-EPI 2021: 10.6 ML/MIN/1.73
EGFRCR SERPLBLD CKD-EPI 2021: 11.1 ML/MIN/1.73
EOSINOPHIL # BLD AUTO: 0.15 10*3/MM3 (ref 0–0.4)
EOSINOPHIL NFR BLD AUTO: 1.7 % (ref 0.3–6.2)
ERYTHROCYTE [DISTWIDTH] IN BLOOD BY AUTOMATED COUNT: 15.3 % (ref 12.3–15.4)
GLOBULIN UR ELPH-MCNC: 3.3 GM/DL
GLUCOSE SERPL-MCNC: 124 MG/DL (ref 65–99)
GLUCOSE SERPL-MCNC: 150 MG/DL (ref 65–99)
HCT VFR BLD AUTO: 29.1 % (ref 37.5–51)
HGB BLD-MCNC: 9.2 G/DL (ref 13–17.7)
IMM GRANULOCYTES # BLD AUTO: 0.25 10*3/MM3 (ref 0–0.05)
IMM GRANULOCYTES NFR BLD AUTO: 2.8 % (ref 0–0.5)
INR PPP: 1 (ref 0.86–1.15)
LACTOFERRIN STL QL LA: POSITIVE
LYMPHOCYTES # BLD AUTO: 1.54 10*3/MM3 (ref 0.7–3.1)
LYMPHOCYTES NFR BLD AUTO: 17 % (ref 19.6–45.3)
MAGNESIUM SERPL-MCNC: 2.2 MG/DL (ref 1.6–2.4)
MAGNESIUM SERPL-MCNC: 2.3 MG/DL (ref 1.6–2.4)
MCH RBC QN AUTO: 29.9 PG (ref 26.6–33)
MCHC RBC AUTO-ENTMCNC: 31.6 G/DL (ref 31.5–35.7)
MCV RBC AUTO: 94.5 FL (ref 79–97)
MONOCYTES # BLD AUTO: 1.37 10*3/MM3 (ref 0.1–0.9)
MONOCYTES NFR BLD AUTO: 15.1 % (ref 5–12)
NEUTROPHILS NFR BLD AUTO: 5.69 10*3/MM3 (ref 1.7–7)
NEUTROPHILS NFR BLD AUTO: 62.8 % (ref 42.7–76)
NRBC BLD AUTO-RTO: 0.2 /100 WBC (ref 0–0.2)
PHOSPHATE SERPL-MCNC: 3 MG/DL (ref 2.5–4.5)
PHOSPHATE SERPL-MCNC: 3.2 MG/DL (ref 2.5–4.5)
PLATELET # BLD AUTO: 90 10*3/MM3 (ref 140–450)
PMV BLD AUTO: 11.1 FL (ref 6–12)
POTASSIUM SERPL-SCNC: 3 MMOL/L (ref 3.5–5.2)
POTASSIUM SERPL-SCNC: 3.1 MMOL/L (ref 3.5–5.2)
PROT SERPL-MCNC: 5.5 G/DL (ref 6–8.5)
PROT SERPL-MCNC: 5.8 G/DL (ref 6–8.5)
PROTHROMBIN TIME: 13.4 SECONDS (ref 11.8–14.9)
RBC # BLD AUTO: 3.08 10*6/MM3 (ref 4.14–5.8)
SODIUM SERPL-SCNC: 136 MMOL/L (ref 136–145)
SODIUM SERPL-SCNC: 137 MMOL/L (ref 136–145)
WBC NRBC COR # BLD AUTO: 9.05 10*3/MM3 (ref 3.4–10.8)

## 2024-02-20 PROCEDURE — 84100 ASSAY OF PHOSPHORUS: CPT | Performed by: INTERNAL MEDICINE

## 2024-02-20 PROCEDURE — 99233 SBSQ HOSP IP/OBS HIGH 50: CPT | Performed by: INTERNAL MEDICINE

## 2024-02-20 PROCEDURE — 83735 ASSAY OF MAGNESIUM: CPT | Performed by: FAMILY MEDICINE

## 2024-02-20 PROCEDURE — 87506 IADNA-DNA/RNA PROBE TQ 6-11: CPT | Performed by: FAMILY MEDICINE

## 2024-02-20 PROCEDURE — 85610 PROTHROMBIN TIME: CPT | Performed by: INTERNAL MEDICINE

## 2024-02-20 PROCEDURE — 99232 SBSQ HOSP IP/OBS MODERATE 35: CPT | Performed by: INTERNAL MEDICINE

## 2024-02-20 PROCEDURE — 83735 ASSAY OF MAGNESIUM: CPT | Performed by: INTERNAL MEDICINE

## 2024-02-20 PROCEDURE — 25010000002 HEPARIN (PORCINE) PER 1000 UNITS: Performed by: INTERNAL MEDICINE

## 2024-02-20 PROCEDURE — 85025 COMPLETE CBC W/AUTO DIFF WBC: CPT | Performed by: FAMILY MEDICINE

## 2024-02-20 PROCEDURE — 83630 LACTOFERRIN FECAL (QUAL): CPT | Performed by: INTERNAL MEDICINE

## 2024-02-20 PROCEDURE — 84100 ASSAY OF PHOSPHORUS: CPT | Performed by: FAMILY MEDICINE

## 2024-02-20 PROCEDURE — 25010000002 CEFTRIAXONE PER 250 MG: Performed by: INTERNAL MEDICINE

## 2024-02-20 PROCEDURE — 82248 BILIRUBIN DIRECT: CPT | Performed by: FAMILY MEDICINE

## 2024-02-20 PROCEDURE — 86140 C-REACTIVE PROTEIN: CPT | Performed by: INTERNAL MEDICINE

## 2024-02-20 PROCEDURE — 99233 SBSQ HOSP IP/OBS HIGH 50: CPT | Performed by: FAMILY MEDICINE

## 2024-02-20 PROCEDURE — 80053 COMPREHEN METABOLIC PANEL: CPT | Performed by: FAMILY MEDICINE

## 2024-02-20 RX ORDER — HYDROXYZINE HYDROCHLORIDE 25 MG/1
25 TABLET, FILM COATED ORAL ONCE AS NEEDED
Status: COMPLETED | OUTPATIENT
Start: 2024-02-20 | End: 2024-02-20

## 2024-02-20 RX ORDER — LIDOCAINE 5 G/100G
1 CREAM RECTAL; TOPICAL 3 TIMES DAILY PRN
Status: DISCONTINUED | OUTPATIENT
Start: 2024-02-20 | End: 2024-02-24 | Stop reason: HOSPADM

## 2024-02-20 RX ORDER — POTASSIUM CHLORIDE 750 MG/1
30 CAPSULE, EXTENDED RELEASE ORAL ONCE
Status: COMPLETED | OUTPATIENT
Start: 2024-02-20 | End: 2024-02-20

## 2024-02-20 RX ORDER — OXYCODONE HYDROCHLORIDE 5 MG/1
5 TABLET ORAL EVERY 8 HOURS PRN
Status: DISCONTINUED | OUTPATIENT
Start: 2024-02-20 | End: 2024-02-24 | Stop reason: HOSPADM

## 2024-02-20 RX ORDER — CHOLESTYRAMINE 4 G/9G
1 POWDER, FOR SUSPENSION ORAL EVERY 12 HOURS SCHEDULED
Status: DISCONTINUED | OUTPATIENT
Start: 2024-02-20 | End: 2024-02-21

## 2024-02-20 RX ORDER — POTASSIUM CHLORIDE 750 MG/1
20 CAPSULE, EXTENDED RELEASE ORAL DAILY
Status: DISCONTINUED | OUTPATIENT
Start: 2024-02-20 | End: 2024-02-24 | Stop reason: HOSPADM

## 2024-02-20 RX ORDER — MIDODRINE HYDROCHLORIDE 2.5 MG/1
2.5 TABLET ORAL
Status: DISCONTINUED | OUTPATIENT
Start: 2024-02-21 | End: 2024-02-21

## 2024-02-20 RX ORDER — OXYCODONE HYDROCHLORIDE 5 MG/1
10 TABLET ORAL EVERY 8 HOURS PRN
Status: DISCONTINUED | OUTPATIENT
Start: 2024-02-20 | End: 2024-02-24 | Stop reason: HOSPADM

## 2024-02-20 RX ORDER — CHOLESTYRAMINE 4 G/9G
1 POWDER, FOR SUSPENSION ORAL 3 TIMES DAILY
Status: DISCONTINUED | OUTPATIENT
Start: 2024-02-20 | End: 2024-02-21

## 2024-02-20 RX ADMIN — SERTRALINE HYDROCHLORIDE 100 MG: 100 TABLET ORAL at 20:42

## 2024-02-20 RX ADMIN — CEFTRIAXONE SODIUM 1000 MG: 1 INJECTION, POWDER, FOR SOLUTION INTRAMUSCULAR; INTRAVENOUS at 13:21

## 2024-02-20 RX ADMIN — Medication 1 DOSE: at 10:03

## 2024-02-20 RX ADMIN — MIDODRINE HYDROCHLORIDE 5 MG: 5 TABLET ORAL at 17:45

## 2024-02-20 RX ADMIN — VANCOMYCIN HYDROCHLORIDE 125 MG: 125 CAPSULE ORAL at 17:48

## 2024-02-20 RX ADMIN — Medication 10 ML: at 10:03

## 2024-02-20 RX ADMIN — SODIUM BICARBONATE 650 MG TABLET 1300 MG: at 20:42

## 2024-02-20 RX ADMIN — SODIUM CHLORIDE, SODIUM LACTATE, CALCIUM CHLORIDE, MAGNESIUM CHLORIDE AND DEXTROSE 1500 ML: 1.5; 538; 448; 18.3; 5.08 INJECTION, SOLUTION INTRAPERITONEAL at 08:13

## 2024-02-20 RX ADMIN — ACETAMINOPHEN 650 MG: 325 TABLET ORAL at 18:03

## 2024-02-20 RX ADMIN — RIFAXIMIN 550 MG: 550 TABLET ORAL at 15:22

## 2024-02-20 RX ADMIN — RIFAXIMIN 550 MG: 550 TABLET ORAL at 20:41

## 2024-02-20 RX ADMIN — WHITE PETROLATUM 1 APPLICATION: 1.75 OINTMENT TOPICAL at 20:42

## 2024-02-20 RX ADMIN — WHITE PETROLATUM 1 APPLICATION: 1.75 OINTMENT TOPICAL at 17:49

## 2024-02-20 RX ADMIN — SODIUM CHLORIDE, SODIUM LACTATE, CALCIUM CHLORIDE, MAGNESIUM CHLORIDE AND DEXTROSE 1500 ML: 1.5; 538; 448; 18.3; 5.08 INJECTION, SOLUTION INTRAPERITONEAL at 23:08

## 2024-02-20 RX ADMIN — MIDODRINE HYDROCHLORIDE 5 MG: 5 TABLET ORAL at 08:13

## 2024-02-20 RX ADMIN — POTASSIUM CHLORIDE 30 MEQ: 10 CAPSULE, COATED, EXTENDED RELEASE ORAL at 08:13

## 2024-02-20 RX ADMIN — CHOLESTYRAMINE 1 PACKET: 4 POWDER, FOR SUSPENSION ORAL at 20:41

## 2024-02-20 RX ADMIN — POTASSIUM CHLORIDE 20 MEQ: 10 CAPSULE, COATED, EXTENDED RELEASE ORAL at 10:03

## 2024-02-20 RX ADMIN — SODIUM CHLORIDE, SODIUM LACTATE, CALCIUM CHLORIDE, MAGNESIUM CHLORIDE AND DEXTROSE 1500 ML: 1.5; 538; 448; 18.3; 5.08 INJECTION, SOLUTION INTRAPERITONEAL at 17:59

## 2024-02-20 RX ADMIN — MIDODRINE HYDROCHLORIDE 5 MG: 5 TABLET ORAL at 12:28

## 2024-02-20 RX ADMIN — VANCOMYCIN HYDROCHLORIDE 125 MG: 125 CAPSULE ORAL at 06:37

## 2024-02-20 RX ADMIN — NYSTATIN 500000 UNITS: 100000 SUSPENSION ORAL at 17:45

## 2024-02-20 RX ADMIN — HEPARIN SODIUM 5000 UNITS: 5000 INJECTION INTRAVENOUS; SUBCUTANEOUS at 10:03

## 2024-02-20 RX ADMIN — Medication 10 ML: at 20:41

## 2024-02-20 RX ADMIN — CEFTRIAXONE SODIUM 1000 MG: 1 INJECTION, POWDER, FOR SOLUTION INTRAMUSCULAR; INTRAVENOUS at 02:31

## 2024-02-20 RX ADMIN — CHOLESTYRAMINE 1 PACKET: 4 POWDER, FOR SUSPENSION ORAL at 17:49

## 2024-02-20 RX ADMIN — VANCOMYCIN HYDROCHLORIDE 125 MG: 125 CAPSULE ORAL at 23:22

## 2024-02-20 RX ADMIN — HEPARIN SODIUM 5000 UNITS: 5000 INJECTION INTRAVENOUS; SUBCUTANEOUS at 20:41

## 2024-02-20 RX ADMIN — VANCOMYCIN HYDROCHLORIDE 125 MG: 125 CAPSULE ORAL at 12:29

## 2024-02-20 RX ADMIN — HYDROXYZINE HYDROCHLORIDE 25 MG: 25 TABLET, FILM COATED ORAL at 23:22

## 2024-02-20 RX ADMIN — SODIUM CHLORIDE, SODIUM LACTATE, CALCIUM CHLORIDE, MAGNESIUM CHLORIDE AND DEXTROSE 1500 ML: 1.5; 538; 448; 18.3; 5.08 INJECTION, SOLUTION INTRAPERITONEAL at 13:21

## 2024-02-20 RX ADMIN — LEVOTHYROXINE SODIUM 125 MCG: 125 TABLET ORAL at 06:37

## 2024-02-20 RX ADMIN — CHOLESTYRAMINE 1 PACKET: 4 POWDER, FOR SUSPENSION ORAL at 20:42

## 2024-02-20 RX ADMIN — SODIUM BICARBONATE 650 MG TABLET 1300 MG: at 10:03

## 2024-02-20 RX ADMIN — NYSTATIN 500000 UNITS: 100000 SUSPENSION ORAL at 20:40

## 2024-02-20 RX ADMIN — NYSTATIN 500000 UNITS: 100000 SUSPENSION ORAL at 12:28

## 2024-02-20 RX ADMIN — NYSTATIN 500000 UNITS: 100000 SUSPENSION ORAL at 08:13

## 2024-02-20 NOTE — PLAN OF CARE
Goal Outcome Evaluation:         PD continued.  Wound care consult.  VSS.  No acute changes this shift.  NO signs of distress noted at this time.

## 2024-02-20 NOTE — PROGRESS NOTES
[facility]   Hospitalist Progress Note  Date: 2024  Patient Name: Nelson Wang  : 1945  MRN: [id number]  Date of admission: 2024      Subjective   Subjective     Chief complaint: Fever and weakness    Summary:  79-year-old male with history of ESRD on peritoneal dialysis, hypertension, diabetes mellitus, dyslipidemia, hypothyroidism, hyperuricemia/gout, vitiligo, hospitalized on 2024 with chief complaint of fevers and weakness, found to be in septic shock due to E. coli bacteremia and right lower lobe pneumonia, with C. difficile positive stools, with concern for acute peritonitis, placed on broad-spectrum antibiotics, nephrology and critical care consulted, required Levophed, central line placed, Levophed weaned, transferred out of the ICU after starting midodrine, C. difficile positive stools, vancomycin orally started, GI consulted with persistent ongoing diarrhea.  Speech therapy consulted with concern for aspiration, modified barium swallow indicated laryngeal penetration and silent aspiration with thin liquids, recommended nectar thick solids, no straws    Interval follow-up: Seen and examined this morning, was in no acute distress, no acute major overnight events, diarrhea have subsided some but still apparent.  Discussed aspiration risks and precautions.  TSH at 14, T4 total at 3.1, has been off levothyroxine for a few days, will repeat thyroid panel and adjust levothyroxine accordingly.  White blood cell count down to 9000, hemoglobin 9.2.  Telemetry reviewed, no acute major rhythmic events.  Continues to struggle with perineal and perirectal pain due to excoriation of skin due to ongoing diarrhea.  Blood pressures are better controlled.  Potassium 3.1, replacement potassium ordered.    Review of systems:  All systems reviewed negative except for ongoing diarrhea, weakness, generalized fatigue, excoriation of perianal skin    Objective   Objective     Vitals:   Temp:  [98.1 °F  (36.7 °C)-98.9 °F (37.2 °C)] 98.5 °F (36.9 °C)  Heart Rate:  [] 77  Resp:  [18-24] 22  BP: (143-182)/(47-92) 151/59  Physical Exam        Constitutional: Awake, alert, no acute distress sitting upright at the edge of the bed   Eyes: Pupils equal, sclerae anicteric, no conjunctival injection   HENT: NCAT, mucous membranes dry   Neck: Supple, full range of motion  Respiratory: Diminished coarse breath sounds mostly on the right side lower lobe   Cardiovascular: RRR, no murmurs, rubs, or gallops, palpable pedal pulses bilaterally   Gastrointestinal: Positive bowel sounds, soft, nontender, distended, PD catheter in place   Musculoskeletal: No bilateral ankle edema, no clubbing or cyanosis to extremities   Psychiatric: Appropriate affect, cooperative   Neurologic: Oriented x 3, strength symmetric in all extremities, Cranial Nerves grossly intact to confrontation, speech clear   Skin: No rashes visible on exposed skin; rectal exam deferred    Result Review    Result Review:  I have personally reviewed the pertinent results from the past 24 hours to 2/20/2024 18:51 EST and agree with these findings:  [x]  Laboratory   CBC          2/18/2024    05:28 2/19/2024    05:01 2/20/2024    05:18   CBC   WBC 11.50  13.99  9.05    RBC 3.20  3.25  3.08    Hemoglobin 9.7  9.9  9.2    Hematocrit 29.9  30.7  29.1    MCV 93.4  94.5  94.5    MCH 30.3  30.5  29.9    MCHC 32.4  32.2  31.6    RDW 14.9  15.1  15.3    Platelets 75  82  90      BMP          2/18/2024    05:28 2/19/2024    04:33 2/20/2024    05:18   BMP   BUN 43  42  36    Creatinine 5.85  5.54  5.24    Sodium 135  136  137    Potassium 3.2  3.3  3.1    Chloride 97  99  101    CO2 22.6  21.5  22.5    Calcium 8.6  8.9  8.8      LIVER FUNCTION TESTS:      Lab 02/20/24  0518 02/19/24  0433 02/18/24  0528 02/16/24  0746   TOTAL PROTEIN 5.5* 5.9* 5.5* 5.7*   ALBUMIN 2.5* 2.5* 2.6* 3.0*   GLOBULIN  --  3.4 2.9 2.7   ALT (SGPT) 14 15 13 9   AST (SGOT) 31 30 30 15   BILIRUBIN 0.3  0.4 0.5 0.6   INDIRECT BILIRUBIN 0.1  --   --   --    BILIRUBIN DIRECT 0.2  --   --   --    ALK PHOS 59 67 50 60       [x]  Microbiology   Microbiology Results (last 10 days)       Procedure Component Value - Date/Time    Blood Culture - Blood, Hand, Right [[id number]]  (Normal) Collected: 02/18/24 1441    Lab Status: Preliminary result Specimen: Blood from Hand, Right Updated: 02/20/24 1515     Blood Culture No growth at 2 days    Blood Culture - Blood, Neck [[id number]]  (Normal) Collected: 02/18/24 1440    Lab Status: Preliminary result Specimen: Blood from Neck Updated: 02/20/24 1515     Blood Culture No growth at 2 days    Clostridioides difficile Toxin - Stool, Per Rectum [[id number]]  (Abnormal) Collected: 02/16/24 2010    Lab Status: Final result Specimen: Stool from Per Rectum Updated: 02/16/24 2138    Narrative:      The following orders were created for panel order Clostridioides difficile Toxin - Stool, Per Rectum.  Procedure                               Abnormality         Status                     ---------                               -----------         ------                     Clostridioides difficile...[[id number]]  Abnormal            Final result                 Please view results for these tests on the individual orders.    Clostridioides difficile Toxin, PCR - Stool, Per Rectum [[id number]]  (Abnormal) Collected: 02/16/24 2010    Lab Status: Final result Specimen: Stool from Per Rectum Updated: 02/16/24 2138     Toxigenic C. difficile by PCR Positive     027 Toxin Presumptive Negative    Narrative:      DNA from a toxigenic strain of C.difficile has been detected. Antigen testing for the presence of free C.difficile toxin is currently in progress, to help determine the clinical significance of this PCR result.     Enteric Bacterial Panel - Stool, Per Rectum [[id number]]  (Normal) Collected: 02/16/24 2010    Lab Status: Final result Specimen: Stool from Per Rectum Updated: 02/17/24 1248      Salmonella Not Detected     Campylobacter Not Detected     Shigella/Enteroinvasive E. coli (EIEC) Not Detected     Shiga-like toxin-producing E. coli (STEC) stx1/stx2 Not Detected    Clostridioides difficile toxin Ag, Reflex - Stool, Per Rectum [[id number]]  (Normal) Collected: 02/16/24 2010    Lab Status: Final result Specimen: Stool from Per Rectum Updated: 02/16/24 2139     C.diff Toxin Ag Negative    Narrative:      DNA from a toxigenic strain of C.difficile was detected, although the free toxin itself was not detected. These findings are consistent with C.difficile colonization and may not reflect actual C.difficile infection. Clinical correlation needed.    MRSA Screen, PCR (Inpatient) - Swab, Nares [[id number]]  (Normal) Collected: 02/16/24 1447    Lab Status: Final result Specimen: Swab from Nares Updated: 02/16/24 1635     MRSA PCR No MRSA Detected    Narrative:      The negative predictive value of this diagnostic test is high and should only be used to consider de-escalating anti-MRSA therapy. A positive result may indicate colonization with MRSA and must be correlated clinically.    Respiratory Panel PCR w/COVID-19(SARS-CoV-2) RA/TANIA/GAVIN/PAD/COR/NENA In-House, NP Swab in UTM/VTM, 2 HR TAT - Swab, Nasopharynx [[id number]]  (Normal) Collected: 02/16/24 1447    Lab Status: Final result Specimen: Swab from Nasopharynx Updated: 02/16/24 1622     ADENOVIRUS, PCR Not Detected     Coronavirus 229E Not Detected     Coronavirus HKU1 Not Detected     Coronavirus NL63 Not Detected     Coronavirus OC43 Not Detected     COVID19 Not Detected     Human Metapneumovirus Not Detected     Human Rhinovirus/Enterovirus Not Detected     Influenza A PCR Not Detected     Influenza B PCR Not Detected     Parainfluenza Virus 1 Not Detected     Parainfluenza Virus 2 Not Detected     Parainfluenza Virus 3 Not Detected     Parainfluenza Virus 4 Not Detected     RSV, PCR Not Detected     Bordetella pertussis pcr Not Detected      Bordetella parapertussis PCR Not Detected     Chlamydophila pneumoniae PCR Not Detected     Mycoplasma pneumo by PCR Not Detected    Narrative:      In the setting of a positive respiratory panel with a viral infection PLUS a negative procalcitonin without other underlying concern for bacterial infection, consider observing off antibiotics or discontinuation of antibiotics and continue supportive care. If the respiratory panel is positive for atypical bacterial infection (Bordetella pertussis, Chlamydophila pneumoniae, or Mycoplasma pneumoniae), consider antibiotic de-escalation to target atypical bacterial infection.    Legionella Antigen, Urine - Urine, Urine, Clean Catch [[id number]]  (Normal) Collected: 02/16/24 1127    Lab Status: Final result Specimen: Urine, Clean Catch Updated: 02/16/24 1322     LEGIONELLA ANTIGEN, URINE Negative    S. Pneumo Ag Urine or CSF - Urine, Urine, Clean Catch [[id number]]  (Normal) Collected: 02/16/24 1127    Lab Status: Final result Specimen: Urine, Clean Catch Updated: 02/16/24 1322     Strep Pneumo Ag Negative    Body Fluid Culture - Body Fluid, Peritoneum [[id number]] Collected: 02/16/24 1030    Lab Status: Final result Specimen: Body Fluid from Peritoneum Updated: 02/19/24 0852     Body Fluid Culture No growth at 3 days     Gram Stain No WBCs seen      No organisms seen    Blood Culture - Blood, Arm, Left [[id number]]  (Abnormal) Collected: 02/16/24 0937    Lab Status: Final result Specimen: Blood from Arm, Left Updated: 02/18/24 0922     Blood Culture Escherichia coli     Isolated from Aerobic and Anaerobic Bottles     Gram Stain Anaerobic Bottle Gram negative bacilli      Aerobic Bottle Gram negative bacilli    Narrative:      Refer to previous blood culture collected on 2/16 for andreas's      COVID-19, FLU A/B, RSV PCR 1 HR TAT - Swab, Nasopharynx [[id number]]  (Normal) Collected: 02/16/24 0753    Lab Status: Final result Specimen: Swab from Nasopharynx Updated: 02/16/24 0806      COVID19 Not Detected     Influenza A PCR Not Detected     Influenza B PCR Not Detected     RSV, PCR Not Detected    Narrative:      Fact sheet for providers: https://www.fda.gov/media/494573/download    Fact sheet for patients: https://www.fda.gov/media/391488/download    Test performed by PCR.    Blood Culture - Blood, Arm, Right [[id number]]  (Abnormal)  (Susceptibility) Collected: 02/16/24 0746    Lab Status: Final result Specimen: Blood from Arm, Right Updated: 02/18/24 0922     Blood Culture Escherichia coli     Isolated from Aerobic and Anaerobic Bottles     Gram Stain Aerobic Bottle Gram negative bacilli      Anaerobic Bottle Gram negative bacilli    Susceptibility        Escherichia coli      RITU      Amoxicillin + Clavulanate Susceptible      Ampicillin Resistant      Ampicillin + Sulbactam Resistant      Cefepime Susceptible      Ceftazidime Susceptible      Ceftriaxone Susceptible      Gentamicin Susceptible      Levofloxacin Resistant      Piperacillin + Tazobactam Susceptible      Trimethoprim + Sulfamethoxazole Susceptible                       Susceptibility Comments       Escherichia coli    Cefazolin sensitivity will not be reported for Enterobacteriaceae in non-urine isolates. If cefazolin is preferred, please call the microbiology lab to request an E-test.  With the exception of urinary-sourced infections, aminoglycosides should not be used as monotherapy.               Blood Culture ID, PCR - Blood, Arm, Right [[id number]]  (Abnormal) Collected: 02/16/24 0746    Lab Status: Final result Specimen: Blood from Arm, Right Updated: 02/16/24 2146     BCID, PCR Escherichia coli. Identification by BCID2 PCR.     BOTTLE TYPE Aerobic Bottle    Narrative:      No resistance genes detected.              [x]  Radiology FL Esophagram w Modified Barium Swallow Single Contrast    Result Date: 2/19/2024  1. Presbyesophagus 2. Prominent cricopharyngeal bar 3. Laryngeal penetration and silent tracheal aspiration with  thin liquid barium     HUYEN SANDHU       Electronically Signed and Approved By: Angel Luis Barrientos M.D. on 2/19/2024 at 17:24             CT Abdomen Pelvis Without Contrast    Result Date: 2/16/2024    1. Extensive right lower lobe pneumonia. 2. Pneumoperitoneum similar to prior chest CT likely related to peritoneal dialysis catheter.  No organized fluid collection or abscess. 3. Nonobstructing 11 mm calculus at the right renal pelvis unchanged. 4. Additional chronic findings above.     LISY SANTILLAN MD       Electronically Signed and Approved By: LISY SANTILLAN MD on 2/16/2024 at 14:46             XR Chest 1 View    Result Date: 2/16/2024   Right lower lobe airspace opacity consistent with pneumonia.  Pneumoperitoneum, better seen on same day CT of the abdomen/pelvis.       JOSH HAMPTON MD       Electronically Signed and Approved By: JOSH HAMPTON MD on 2/16/2024 at 14:30             XR Chest 1 View    Result Date: 2/16/2024    No acute process.       LISY SANTILLAN MD       Electronically Signed and Approved By: LISY SANTILLAN MD on 2/16/2024 at 8:07                [x]  EKG/Telemetry   No orders to display       [x]  Cardiology/Vascular   []  Pathology  [x]  Old records  []  Other:    Assessment & Plan   Assessment / Plan     Assessment/Plan:  Assessment:  Septic shock due to E. coli bacteremia  E. coli bacteremia  Right lower lobe pneumonia likely due to E. coli likely due to silent aspiration  Psoriasis on chronic immunosuppression  C. difficile colitis and diarrhea  Acute peritonitis  Anion gap metabolic acidosis  Lactic acidosis  ESRD on PD  Hypokalemia  Diabetes mellitus with insulin use  Essential hypertension  Dyslipidemia  Hypothyroidism  Hyperuricemia/gout  Anemia of chronic kidney disease  Thrombocytopenia    Plan:  Labs and imaging reviewed  Change midodrine to 2.5 mg 3 times daily watching blood pressures closely as we taper him off midodrine  Questran started today to help solidify stools  Continue vancomycin  oral 125 mg every 6 hours  GI consulted Dr. Marroquin, recommendations appreciated; continued rifaximin  Check TSH and free T4 in the next few days  Continue levothyroxine 125 mcg daily  Check stool parasitic and bacterial PCR which remains pending  Compounded dressing for excoriated perianal area  Continue ceftriaxone 1 g daily IV will need to complete 2 weeks total  Renal diet nectar thick liquids, patient has no interest in going n.p.o. and having alternate tube placed for feeding  Assumes risk of aspiration   Zofran for nausea  Melatonin at night to help with sleep  Prophylactic nystatin swish and swallow  Sodium bicarb tabs 1300 mg twice a day continued  Dialysis fluid exchange per nephrology  Pulmonary consulted discussed with Dr. Valentin, recommendations appreciated; follow-up repeat scan in outpatient  Continue telemetry monitoring  A.m. labs  Full code  PT/OT  DVT prophylaxis with heparin  Discussed with nurse at the bedside    DVT prophylaxis:  Medical and mechanical DVT prophylaxis orders are present.        CODE STATUS:   Level Of Support Discussed With: Patient  Code Status (Patient has no pulse and is not breathing): CPR (Attempt to Resuscitate)  Medical Interventions (Patient has pulse or is breathing): Full Support    Electronically signed by Saúl Vargas MD, 02/20/24, 6:55 PM EST.  Portions of this documentation were transcribed electronically from a voice recognition software.  I confirm all data accurately represents the service(s) I performed at today's visit.

## 2024-02-20 NOTE — PROGRESS NOTES
Pulmonary / Critical Care Progress Note      Patient Name: Nelson Wang  : 1945  MRN: [id number]  Attending:  Saúl Vargas MD   Date of admission: 2024    Subjective   Subjective   Follow-up for septic shock, right lower lobe pneumonia    On room air  Denies any coughing or dyspnea  Still having issues with diarrhea  Swallow evaluation consistent with aspiration  On PD fluid exchanges  On antibiotics for E. coli bacteremia  No fevers noted    Objective   Objective     Vitals:   Vital signs for last 24 hours:  Temp:  [97.9 °F (36.6 °C)-98.9 °F (37.2 °C)] 98.1 °F (36.7 °C)  Heart Rate:  [71-78] 78  Resp:  [18-24] 22  BP: (143-182)/(47-92) 143/55    Intake/Output last 3 shifts:  I/O last 3 completed shifts:  In: 7740 [P.O.:240]  Out: 5654   Intake/Output this shift:  No intake/output data recorded.    Physical Exam   Vital Signs Reviewed   WDWN, Alert, NAD.  On room air  HEENT:  PERRL, EOMI.  OP, nares clear  Chest:  good aeration, decreasing right base rhonchi, tympanic to percussion bilaterally, no work of breathing noted  CV: RRR, no MGR, pulses 2+, equal.  Abd:  Soft, NT, ND, + BS, no HSM  EXT:  no clubbing, no cyanosis, no edema  Neuro:  A&Ox3, CN grossly intact, no focal deficits.  Skin: No rashes or lesions noted, PD cath in place    Result Review    Result Review:  I have personally reviewed the results from the time of this admission to 2024 08:05 EST and agree with these findings:  [x]  Laboratory  [x]  Microbiology  [x]  Radiology  []  EKG/Telemetry   [x]  Cardiology/Vascular   []  Pathology  []  Old records  []  Other:  Most notable findings include:   Blood culture 2/ gram-negative bacilli, BC ID E. coli  C. difficile positive                      Lab 24  0518 24  0501 24  0433 24  0528 24  0230 24  0746   WBC 9.05 13.99*  --  11.50* 9.71 5.34   HEMOGLOBIN 9.2* 9.9*  --  9.7* 10.0* 10.9*   HEMATOCRIT 29.1* 30.7*  --  29.9* 32.1* 34.2*    PLATELETS 90* 82*  --  75* 102* 102*   SODIUM 137  --  136 135* 136 135*   POTASSIUM 3.1*  --  3.3* 3.2* 3.7 3.1*   CHLORIDE 101  --  99 97* 99 95*   CO2 22.5  --  21.5* 22.6 19.6* 24.2   BUN 36*  --  42* 43* 44* 38*   CREATININE 5.24*  --  5.54* 5.85* 6.62* 7.22*   GLUCOSE 124*  --  115* 78 162* 96   CALCIUM 8.8  --  8.9 8.6 7.7* 8.3*   PHOSPHORUS 3.2  --  3.4 3.7 5.3*  --    TOTAL PROTEIN 5.5*  --  5.9* 5.5*  --  5.7*   ALBUMIN 2.5*  --  2.5* 2.6*  --  3.0*   GLOBULIN  --   --  3.4 2.9  --  2.7     CT Chest Without Contrast Diagnostic    Result Date: 2/18/2024  PROCEDURE: CT CHEST WO CONTRAST DIAGNOSTIC  COMPARISON: [facility], CT, CT CHEST WO CONTRAST DIAGNOSTIC, 12/06/2023, 11:11.  [facility], CT, CT ABDOMEN PELVIS WO CONTRAST, 2/16/2024, 14:16.  INDICATIONS: SHORTNESS OF BREATH  PROTOCOL:   Standard imaging protocol performed    RADIATION:   DLP: 313 mGy*cm   Automated exposure control was utilized to minimize radiation dose.  TECHNIQUE: Axial images of the chest without intravenous contrast.  FINDINGS: Slight improvement in right lower lobe airspace consolidation.  Areas of ground-glass opacity in the right mid and lower lung field appear stable.  No cavitary lesions are identified.  Stable pneumoperitoneum and abdominal free fluid likely related to peritoneal dialysis.  Coronary artery calcification is present.  The thoracic aorta has a normal caliber.  There is no mediastinal, axillary or hilar adenopathy.  Right IJ central line tip is in the SVC.  There is mild gynecomastia.  No evidence of pneumothorax.  No evidence of significant pleural or pericardial effusion.  Degenerative changes are present in the thoracic spine.  IMPRESSION:  Slight improvement in the right lower lobe airspace consolidation.  EPIFANIO RENTERIA MD       Electronically Signed and Approved By: EPIFANIO RENTERIA MD on 2/18/2024 at 10:04                Assessment & Plan   Assessment / Plan     Active  Hospital Problems:  Active Hospital Problems    Diagnosis     **Peritonitis      Impression:  Septic shock, present on admission  Right lower lobe pneumonia of unspecified organism  E. coli bacteremia  C. difficile colitis  Aspiration pneumonia of right lower lobe.  Suspect related to E. coli  Acute on chronic diarrhea  Clinically significant lactic acidosis  ESRD on peritoneal dialysis  Anemia of CKD  Hypokalemia  Anion gap metabolic acidosis  Psoriasis on chronic immunosuppression    Plan:  -Patient currently on room air  -Continue aspiration precautions.  Swallow study consistent with aspiration  -Continue ceftriaxone for E. coli bacteremia  -Continue oral vancomycin for C. difficile colitis  -Repeat noncontrast chest CT in 3 months as outpatient  -Echocardiogram consistent with diastolic dysfunction  -Continue midodrine 5 mg 3 times daily  -Nephrology following, PD per their service  -Trend renal panel and electrolytes.  Replace potassium orally  -Encourage activity and incentive spirometer use    DVT prophylaxis:  Medical and mechanical DVT prophylaxis orders are present.    CODE STATUS:   Level Of Support Discussed With: Patient  Code Status (Patient has no pulse and is not breathing): CPR (Attempt to Resuscitate)  Medical Interventions (Patient has pulse or is breathing): Full Support      Labs, imaging, microbiology, notes and medications personally reviewed  Discussed with primary    Electronically signed by Scottie Valentin MD, 02/20/24, 4:10 PM EST.

## 2024-02-20 NOTE — PROGRESS NOTES
[facility]     Nephrology Progress Note      Patient Name: Nelson Wang  : 1945  MRN: [id number]  Primary Care Physician:  Janna Mo MD  Date of admission: 2024    Subjective   Subjective     Interval History:  Patient Reports feeling somewhat better.  Draining now.  Still with significant diarrhea, every 15 to 30 minutes, watery.    Review of Systems   All systems were reviewed and negative except for: What is mentioned above.    Objective   Objective     Vitals:   Temp:  [97.9 °F (36.6 °C)-98.9 °F (37.2 °C)] 98.1 °F (36.7 °C)  Heart Rate:  [71-78] 78  Resp:  [18-24] 22  BP: (143-182)/(47-92) 143/55  Physical Exam:   Constitutional: Awake, alert, no distress, conversant and pleasant.   Eyes: sclerae anicteric, no conjunctival injection, some pallor noted.   HENT: mucous membranes moist   Neck: Supple, no thyromegaly, no lymphadenopathy, trachea midline, No JVD   Respiratory: Decreased to auscultation bilaterally, nonlabored respirations    Cardiovascular: RRR, no murmurs, rubs, or gallops.   Gastrointestinal: Positive bowel sounds, soft, nontender, nondistended, site of PD catheter without drainage, erythema or tenderness.   Musculoskeletal: No edema, no clubbing or cyanosis   Psychiatric: Appropriate affect, cooperative   Neurologic: Oriented x 3, moving all extremities, Cranial Nerves grossly intact, speech clear   Skin: warm and dry, no rashes, + vitiligo    Result Review    Result Reviewed:  I have personally reviewed the results from the time of this admission to 2024 08:53 EST and agree with these findings:  [x]  Laboratory  []  Microbiology  [x]  Radiology  []  EKG/Telemetry   []  Cardiology/Vascular   []  Pathology  []  Old records  []  Other:        Lab 24  0518 24  0433 24  0528 24  0230 24  0746   SODIUM 137 136 135* 136 135*   POTASSIUM 3.1* 3.3* 3.2* 3.7 3.1*   CHLORIDE 101 99 97* 99 95*   CO2 22.5 21.5* 22.6 19.6* 24.2   BUN 36* 42* 43*  44* 38*   CREATININE 5.24* 5.54* 5.85* 6.62* 7.22*   GLUCOSE 124* 115* 78 162* 96   EGFR 10.6* 9.9* 9.2* 8.0* 7.2*   ANION GAP 13.5 15.5* 15.4* 17.4* 15.8*   MAGNESIUM 2.3 2.6* 3.0* 1.1*  --    PHOSPHORUS 3.2 3.4 3.7 5.3*  --              Assessment & Plan   Assessment / Plan       Active Hospital Problems:  Active Hospital Problems    Diagnosis     **Peritonitis        Assessment and Plan:    - ESRD, on peritoneal dialysis.  Volume status is adequate.  Electrolytes are as noted above.  Continue 1.5 L, 1.5% dextrose solution 4 times a day.  Monitor closely.  continue nystatin swish and swallow while on antibiotics for fungal peritonitis prevention.  - Severe, watery diarrhea, being evaluated by GI.  - E. coli sepsis, source is unclear to me, possibly related to GI translocation or pneumonia.  On therapy.  No evidence of peritonitis.  - Hypokalemia, being replaced.  Liberalize diet.  Add protein supplements/shakes if able to tolerate.  - Anemia of chronic kidney disease, was on long-acting RONALD as outpatient.  Last dose of Mircera 12/5/2023.  Will check iron studies if hemoglobin is worse.  - Thrombocytopenia, no bleeding, monitor.  - Hypothyroidism, not receiving levothyroxine, per primary.  - Metabolic acidosis, on p.o. bicarb.  Monitor.  - Right lower lobe pneumonia, per primary.

## 2024-02-21 LAB
ADV 40+41 DNA STL QL NAA+NON-PROBE: NOT DETECTED
ALBUMIN SERPL-MCNC: 2.5 G/DL (ref 3.5–5.2)
ALP SERPL-CCNC: 56 U/L (ref 39–117)
ALT SERPL W P-5'-P-CCNC: 15 U/L (ref 1–41)
ANION GAP SERPL CALCULATED.3IONS-SCNC: 14 MMOL/L (ref 5–15)
ANISOCYTOSIS BLD QL: NORMAL
AST SERPL-CCNC: 36 U/L (ref 1–40)
ASTRO TYP 1-8 RNA STL QL NAA+NON-PROBE: NOT DETECTED
BASOPHILS # BLD AUTO: 0.06 10*3/MM3 (ref 0–0.2)
BASOPHILS NFR BLD AUTO: 0.7 % (ref 0–1.5)
BILIRUB CONJ SERPL-MCNC: 0.2 MG/DL (ref 0–0.3)
BILIRUB INDIRECT SERPL-MCNC: 0.2 MG/DL
BILIRUB SERPL-MCNC: 0.4 MG/DL (ref 0–1.2)
BUN SERPL-MCNC: 32 MG/DL (ref 8–23)
BUN/CREAT SERPL: 6.6 (ref 7–25)
C CAYETANENSIS DNA STL QL NAA+NON-PROBE: NOT DETECTED
C COLI+JEJ+UPSA DNA STL QL NAA+NON-PROBE: NOT DETECTED
C COLI+JEJ+UPSA DNA STL QL NAA+NON-PROBE: NOT DETECTED
CALCIUM SPEC-SCNC: 8.7 MG/DL (ref 8.6–10.5)
CHLORIDE SERPL-SCNC: 101 MMOL/L (ref 98–107)
CO2 SERPL-SCNC: 22 MMOL/L (ref 22–29)
CREAT SERPL-MCNC: 4.87 MG/DL (ref 0.76–1.27)
CRYPTOSP DNA STL QL NAA+NON-PROBE: NOT DETECTED
CRYPTOSP DNA STL QL NAA+NON-PROBE: NOT DETECTED
DEPRECATED RDW RBC AUTO: 53.8 FL (ref 37–54)
E HISTOLYT DNA STL QL NAA+NON-PROBE: NOT DETECTED
E HISTOLYT DNA STL QL NAA+NON-PROBE: NOT DETECTED
EAEC PAA PLAS AGGR+AATA ST NAA+NON-PRB: NOT DETECTED
EC STX1+STX2 GENES STL QL NAA+NON-PROBE: NOT DETECTED
EC STX1+STX2 GENES STL QL NAA+NON-PROBE: NOT DETECTED
EGFRCR SERPLBLD CKD-EPI 2021: 11.5 ML/MIN/1.73
EOSINOPHIL # BLD AUTO: 0.23 10*3/MM3 (ref 0–0.4)
EOSINOPHIL NFR BLD AUTO: 2.7 % (ref 0.3–6.2)
EPEC EAE GENE STL QL NAA+NON-PROBE: NOT DETECTED
ERYTHROCYTE [DISTWIDTH] IN BLOOD BY AUTOMATED COUNT: 15.8 % (ref 12.3–15.4)
ETEC LTA+ST1A+ST1B TOX ST NAA+NON-PROBE: NOT DETECTED
G LAMBLIA DNA STL QL NAA+NON-PROBE: NOT DETECTED
G LAMBLIA DNA STL QL NAA+NON-PROBE: NOT DETECTED
GLUCOSE SERPL-MCNC: 129 MG/DL (ref 65–99)
HCT VFR BLD AUTO: 29.7 % (ref 37.5–51)
HGB BLD-MCNC: 9.6 G/DL (ref 13–17.7)
IMM GRANULOCYTES # BLD AUTO: 0.55 10*3/MM3 (ref 0–0.05)
IMM GRANULOCYTES NFR BLD AUTO: 6.4 % (ref 0–0.5)
LARGE PLATELETS: NORMAL
LYMPHOCYTES # BLD AUTO: 1.69 10*3/MM3 (ref 0.7–3.1)
LYMPHOCYTES NFR BLD AUTO: 19.8 % (ref 19.6–45.3)
MAGNESIUM SERPL-MCNC: 1.9 MG/DL (ref 1.6–2.4)
MCH RBC QN AUTO: 30.7 PG (ref 26.6–33)
MCHC RBC AUTO-ENTMCNC: 32.3 G/DL (ref 31.5–35.7)
MCV RBC AUTO: 94.9 FL (ref 79–97)
MONOCYTES # BLD AUTO: 1.35 10*3/MM3 (ref 0.1–0.9)
MONOCYTES NFR BLD AUTO: 15.8 % (ref 5–12)
NEUTROPHILS NFR BLD AUTO: 4.65 10*3/MM3 (ref 1.7–7)
NEUTROPHILS NFR BLD AUTO: 54.6 % (ref 42.7–76)
NOROVIRUS GI+II RNA STL QL NAA+NON-PROBE: NOT DETECTED
NRBC BLD AUTO-RTO: 0.4 /100 WBC (ref 0–0.2)
P SHIGELLOIDES DNA STL QL NAA+NON-PROBE: NOT DETECTED
PHOSPHATE SERPL-MCNC: 2.8 MG/DL (ref 2.5–4.5)
PLATELET # BLD AUTO: 97 10*3/MM3 (ref 140–450)
PMV BLD AUTO: 11.1 FL (ref 6–12)
POIKILOCYTOSIS BLD QL SMEAR: NORMAL
POTASSIUM SERPL-SCNC: 3.3 MMOL/L (ref 3.5–5.2)
PROT SERPL-MCNC: 5.4 G/DL (ref 6–8.5)
RBC # BLD AUTO: 3.13 10*6/MM3 (ref 4.14–5.8)
RVA RNA STL QL NAA+NON-PROBE: NOT DETECTED
S ENT+BONG DNA STL QL NAA+NON-PROBE: NOT DETECTED
S ENT+BONG DNA STL QL NAA+NON-PROBE: NOT DETECTED
SAPO I+II+IV+V RNA STL QL NAA+NON-PROBE: NOT DETECTED
SHIGELLA SP+EIEC IPAH ST NAA+NON-PROBE: NOT DETECTED
SHIGELLA SP+EIEC IPAH ST NAA+NON-PROBE: NOT DETECTED
SODIUM SERPL-SCNC: 137 MMOL/L (ref 136–145)
TTG IGA SER-ACNC: <2 U/ML (ref 0–3)
V CHOL+PARA+VUL DNA STL QL NAA+NON-PROBE: NOT DETECTED
V CHOLERAE DNA STL QL NAA+NON-PROBE: NOT DETECTED
WBC MORPH BLD: NORMAL
WBC NRBC COR # BLD AUTO: 8.53 10*3/MM3 (ref 3.4–10.8)
Y ENTEROCOL DNA STL QL NAA+NON-PROBE: NOT DETECTED

## 2024-02-21 PROCEDURE — 36410 VNPNXR 3YR/> PHY/QHP DX/THER: CPT

## 2024-02-21 PROCEDURE — 83993 ASSAY FOR CALPROTECTIN FECAL: CPT | Performed by: INTERNAL MEDICINE

## 2024-02-21 PROCEDURE — 80048 BASIC METABOLIC PNL TOTAL CA: CPT | Performed by: FAMILY MEDICINE

## 2024-02-21 PROCEDURE — 25010000002 CEFTRIAXONE PER 250 MG: Performed by: INTERNAL MEDICINE

## 2024-02-21 PROCEDURE — 99232 SBSQ HOSP IP/OBS MODERATE 35: CPT | Performed by: INTERNAL MEDICINE

## 2024-02-21 PROCEDURE — 99232 SBSQ HOSP IP/OBS MODERATE 35: CPT | Performed by: FAMILY MEDICINE

## 2024-02-21 PROCEDURE — 84100 ASSAY OF PHOSPHORUS: CPT | Performed by: FAMILY MEDICINE

## 2024-02-21 PROCEDURE — 25810000003 SODIUM CHLORIDE 0.9 % SOLUTION 500 ML FLEX CONT: Performed by: INTERNAL MEDICINE

## 2024-02-21 PROCEDURE — 97161 PT EVAL LOW COMPLEX 20 MIN: CPT

## 2024-02-21 PROCEDURE — 25010000002 CEFTRIAXONE PER 250 MG: Performed by: FAMILY MEDICINE

## 2024-02-21 PROCEDURE — 25010000002 MAGNESIUM SULFATE IN D5W 1G/100ML (PREMIX) 1-5 GM/100ML-% SOLUTION: Performed by: INTERNAL MEDICINE

## 2024-02-21 PROCEDURE — 92526 ORAL FUNCTION THERAPY: CPT

## 2024-02-21 PROCEDURE — 87507 IADNA-DNA/RNA PROBE TQ 12-25: CPT | Performed by: INTERNAL MEDICINE

## 2024-02-21 PROCEDURE — 82653 EL-1 FECAL QUANTITATIVE: CPT | Performed by: INTERNAL MEDICINE

## 2024-02-21 PROCEDURE — 25010000002 OCTREOTIDE PER 25 MCG: Performed by: INTERNAL MEDICINE

## 2024-02-21 PROCEDURE — 25010000002 HEPARIN (PORCINE) PER 1000 UNITS: Performed by: INTERNAL MEDICINE

## 2024-02-21 PROCEDURE — 83735 ASSAY OF MAGNESIUM: CPT | Performed by: FAMILY MEDICINE

## 2024-02-21 PROCEDURE — 80076 HEPATIC FUNCTION PANEL: CPT | Performed by: FAMILY MEDICINE

## 2024-02-21 PROCEDURE — 85007 BL SMEAR W/DIFF WBC COUNT: CPT | Performed by: FAMILY MEDICINE

## 2024-02-21 PROCEDURE — 85025 COMPLETE CBC W/AUTO DIFF WBC: CPT | Performed by: FAMILY MEDICINE

## 2024-02-21 PROCEDURE — C1751 CATH, INF, PER/CENT/MIDLINE: HCPCS

## 2024-02-21 RX ORDER — POTASSIUM CHLORIDE 1.5 G/1.58G
20 POWDER, FOR SOLUTION ORAL ONCE
Status: COMPLETED | OUTPATIENT
Start: 2024-02-21 | End: 2024-02-21

## 2024-02-21 RX ORDER — OCTREOTIDE ACETATE 100 UG/ML
200 INJECTION, SOLUTION INTRAVENOUS; SUBCUTANEOUS EVERY 8 HOURS SCHEDULED
Status: DISCONTINUED | OUTPATIENT
Start: 2024-02-21 | End: 2024-02-21

## 2024-02-21 RX ORDER — MAGNESIUM SULFATE 1 G/100ML
2 INJECTION INTRAVENOUS ONCE
Status: COMPLETED | OUTPATIENT
Start: 2024-02-21 | End: 2024-02-21

## 2024-02-21 RX ORDER — SODIUM CHLORIDE 0.9 % (FLUSH) 0.9 %
10 SYRINGE (ML) INJECTION EVERY 12 HOURS SCHEDULED
Status: DISCONTINUED | OUTPATIENT
Start: 2024-02-21 | End: 2024-02-24 | Stop reason: HOSPADM

## 2024-02-21 RX ORDER — SODIUM CHLORIDE 9 MG/ML
40 INJECTION, SOLUTION INTRAVENOUS AS NEEDED
Status: DISCONTINUED | OUTPATIENT
Start: 2024-02-21 | End: 2024-02-24 | Stop reason: HOSPADM

## 2024-02-21 RX ORDER — SODIUM CHLORIDE 0.9 % (FLUSH) 0.9 %
10 SYRINGE (ML) INJECTION AS NEEDED
Status: DISCONTINUED | OUTPATIENT
Start: 2024-02-21 | End: 2024-02-24 | Stop reason: HOSPADM

## 2024-02-21 RX ORDER — OCTREOTIDE ACETATE 100 UG/ML
200 INJECTION, SOLUTION INTRAVENOUS; SUBCUTANEOUS EVERY 8 HOURS SCHEDULED
Status: DISCONTINUED | OUTPATIENT
Start: 2024-02-21 | End: 2024-02-22

## 2024-02-21 RX ADMIN — NYSTATIN 500000 UNITS: 100000 SUSPENSION ORAL at 08:29

## 2024-02-21 RX ADMIN — MIDODRINE HYDROCHLORIDE 2.5 MG: 2.5 TABLET ORAL at 08:29

## 2024-02-21 RX ADMIN — OXYCODONE 10 MG: 5 TABLET ORAL at 19:04

## 2024-02-21 RX ADMIN — SODIUM CHLORIDE 40 ML: 9 INJECTION, SOLUTION INTRAVENOUS at 16:14

## 2024-02-21 RX ADMIN — CHOLESTYRAMINE 1 PACKET: 4 POWDER, FOR SUSPENSION ORAL at 08:28

## 2024-02-21 RX ADMIN — WHITE PETROLATUM 1 APPLICATION: 1.75 OINTMENT TOPICAL at 08:30

## 2024-02-21 RX ADMIN — SODIUM CHLORIDE, SODIUM LACTATE, CALCIUM CHLORIDE, MAGNESIUM CHLORIDE AND DEXTROSE 1500 ML: 1.5; 538; 448; 18.3; 5.08 INJECTION, SOLUTION INTRAPERITONEAL at 12:54

## 2024-02-21 RX ADMIN — MIDODRINE HYDROCHLORIDE 2.5 MG: 2.5 TABLET ORAL at 12:54

## 2024-02-21 RX ADMIN — CEFTRIAXONE SODIUM 2000 MG: 2 INJECTION, POWDER, FOR SOLUTION INTRAMUSCULAR; INTRAVENOUS at 16:14

## 2024-02-21 RX ADMIN — VANCOMYCIN HYDROCHLORIDE 125 MG: 125 CAPSULE ORAL at 18:31

## 2024-02-21 RX ADMIN — CHOLESTYRAMINE 1 PACKET: 4 POWDER, FOR SUSPENSION ORAL at 08:29

## 2024-02-21 RX ADMIN — SODIUM CHLORIDE, SODIUM LACTATE, CALCIUM CHLORIDE, MAGNESIUM CHLORIDE AND DEXTROSE 1500 ML: 1.5; 538; 448; 18.3; 5.08 INJECTION, SOLUTION INTRAPERITONEAL at 08:29

## 2024-02-21 RX ADMIN — VANCOMYCIN HYDROCHLORIDE 125 MG: 125 CAPSULE ORAL at 12:54

## 2024-02-21 RX ADMIN — NYSTATIN 500000 UNITS: 100000 SUSPENSION ORAL at 18:31

## 2024-02-21 RX ADMIN — SERTRALINE HYDROCHLORIDE 100 MG: 100 TABLET ORAL at 23:58

## 2024-02-21 RX ADMIN — RIFAXIMIN 550 MG: 550 TABLET ORAL at 05:35

## 2024-02-21 RX ADMIN — MAGNESIUM SULFATE 2 G: 1 INJECTION INTRAVENOUS at 09:10

## 2024-02-21 RX ADMIN — NYSTATIN 500000 UNITS: 100000 SUSPENSION ORAL at 12:54

## 2024-02-21 RX ADMIN — Medication 10 ML: at 23:58

## 2024-02-21 RX ADMIN — SODIUM BICARBONATE 650 MG TABLET 1300 MG: at 23:58

## 2024-02-21 RX ADMIN — RIFAXIMIN 550 MG: 550 TABLET ORAL at 23:58

## 2024-02-21 RX ADMIN — CEFTRIAXONE SODIUM 1000 MG: 1 INJECTION, POWDER, FOR SOLUTION INTRAMUSCULAR; INTRAVENOUS at 02:04

## 2024-02-21 RX ADMIN — VANCOMYCIN HYDROCHLORIDE 125 MG: 125 CAPSULE ORAL at 05:35

## 2024-02-21 RX ADMIN — SODIUM CHLORIDE, SODIUM LACTATE, CALCIUM CHLORIDE, MAGNESIUM CHLORIDE AND DEXTROSE 1500 ML: 1.5; 538; 448; 18.3; 5.08 INJECTION, SOLUTION INTRAPERITONEAL at 23:06

## 2024-02-21 RX ADMIN — SODIUM CHLORIDE, SODIUM LACTATE, CALCIUM CHLORIDE, MAGNESIUM CHLORIDE AND DEXTROSE 1500 ML: 1.5; 538; 448; 18.3; 5.08 INJECTION, SOLUTION INTRAPERITONEAL at 18:31

## 2024-02-21 RX ADMIN — POTASSIUM CHLORIDE 20 MEQ: 10 CAPSULE, COATED, EXTENDED RELEASE ORAL at 08:28

## 2024-02-21 RX ADMIN — NYSTATIN 500000 UNITS: 100000 SUSPENSION ORAL at 23:58

## 2024-02-21 RX ADMIN — RIFAXIMIN 550 MG: 550 TABLET ORAL at 16:14

## 2024-02-21 RX ADMIN — HEPARIN SODIUM 5000 UNITS: 5000 INJECTION INTRAVENOUS; SUBCUTANEOUS at 08:28

## 2024-02-21 RX ADMIN — SODIUM BICARBONATE 650 MG TABLET 1300 MG: at 08:28

## 2024-02-21 RX ADMIN — LEVOTHYROXINE SODIUM 125 MCG: 125 TABLET ORAL at 05:36

## 2024-02-21 RX ADMIN — VANCOMYCIN HYDROCHLORIDE 125 MG: 125 CAPSULE ORAL at 23:58

## 2024-02-21 RX ADMIN — OCTREOTIDE ACETATE 200 MCG: 100 INJECTION, SOLUTION INTRAVENOUS; SUBCUTANEOUS at 18:31

## 2024-02-21 RX ADMIN — HEPARIN SODIUM 5000 UNITS: 5000 INJECTION INTRAVENOUS; SUBCUTANEOUS at 23:58

## 2024-02-21 RX ADMIN — Medication 10 ML: at 08:30

## 2024-02-21 RX ADMIN — POTASSIUM CHLORIDE 20 MEQ: 1.5 POWDER, FOR SOLUTION ORAL at 08:28

## 2024-02-21 NOTE — PLAN OF CARE
Goal Outcome Evaluation:  Plan of Care Reviewed With: (P) patient        Progress: (P) improving  Outcome Evaluation: (P) Pt presents to therapy today with some endurance deficits, but ability to ambulate and transfer safely. Pt is safe to d/c home.      Anticipated Discharge Disposition (PT): (P) home

## 2024-02-21 NOTE — SIGNIFICANT NOTE
Wound Eval / Progress Noted    [facility]     Patient Name: Nelson Wang  : 1945  MRN: [id number]  Today's Date: 2024                 Admit Date: 2024    Visit Dx:    ICD-10-CM ICD-9-CM   1. Febrile illness  R50.9 780.60   2. Weakness  R53.1 780.79   3. End stage renal disease  N18.6 585.6   4. Sepsis, due to unspecified organism, unspecified whether acute organ dysfunction present  A41.9 038.9     995.91   5. Dysphagia, unspecified type  R13.10 787.20   6. Difficulty in walking  R26.2 719.7         Peritonitis        Past Medical History:   Diagnosis Date    Anemia     Ankle pain, right     CKD (chronic kidney disease), stage IV     Diabetes     Gout     High cholesterol     HL (hearing loss)     Hyperkalemia     Hypertension     Hypothyroidism     Psoriasis     Vitiligo         Past Surgical History:   Procedure Laterality Date    ANKLE SURGERY  1990    APPENDECTOMY N/A     COLONOSCOPY N/A 2022    [facility]    HIP BIPOLAR REPLACEMENT Right 2000    INSERTION PERITONEAL DIALYSIS CATHETER N/A 3/27/2023    Procedure: LAPAROSCOPIC INSERTION PERITONEAL DIALYSIS CATHETER, LAPAROSCOPIC OMENTOPEXY WITH LYSIS OF ADHESIONS;  Surgeon: Jose Berry MD;  Location: [facility];  Service: General;  Laterality: N/A;    INSERTION PERITONEAL DIALYSIS CATHETER Left 2023    Procedure: REVISION OF PERITONEAL DIALYSIS CATHETER;  Surgeon: Radha Oreilly MD;  Location: [facility];  Service: General;  Laterality: Left;    RENAL BIOPSY Left 07/15/2022         Physical Assessment:  Wound 24 Right lower arm Skin Tear (Active)   Dressing Appearance intact 24 1055       Wound 24 Right anterior knee Skin Tear (Active)   Dressing Appearance intact;moist drainage 24 1055   Closure None 24 1055   Base moist;red 24 1055       Wound 24 1050 Bilateral medial perirectal MASD (Moisture associated skin damage) (Active)   Dressing Appearance open  to air 02/21/24 1055   Closure None 02/21/24 1055   Base moist;red 02/21/24 1055   Periwound moist;redness;macerated 02/21/24 1055   Periwound Temperature warm 02/21/24 1055   Periwound Skin Turgor soft 02/21/24 1055   Edges open;rolled/closed 02/21/24 1055   Drainage Amount none 02/21/24 1055   Care, Wound cleansed with;sterile normal saline 02/21/24 1055   Periwound Care barrier film applied;other (see comments) 02/21/24 1055        Wound Check / Follow-up:  Patient seen today for wound consult. Patient is currently in enteric isolation for c-diff. He is incontinent of bowel upon arrival to room and wearing a brief that he is requesting to change.     Brief removed. Patient with difficulties tolerating skin care / hygiene. He tolerated gauze and saline cleansing fairly well. After cleansing skin, discussed treatment options with patient. Discussed applying skin barrier wipes to seal skin and then using a skin barrier wipe with silicone to further treat skin this application. Discussed further options of possibly crusting with absorbent powder and then skin barrier wipes going forward. Patient is agreeable. Discussed continued use of blue barrier wipes after cleansing to further protect skin. Patient also agreeable.     With assessment, two dressings noted. One to right knee and one to right lower arm. Dressing to knee peeled, patient states he  hit it on something and sustained a skin tear. He states the wound to his arm is also a skin tear. Recommending skin tear protocol to those areas.     Patient assisted back to bed at this time. Call light in reach       Impression: Jennifer-rectal irritation with IAD. Skin tears to right knee and right lower arm    Short term goals:  Regain skin integrity. Topical treatments. Skin tear protocol. Moisture prevention    Barbara Parr RN    2/21/2024    12:34 EST

## 2024-02-21 NOTE — PROGRESS NOTES
[facility]  Inpatient Progress Note    Reason for Follow Up: Acute diarrhea    Interval History: Patient is 79 y.o. pleasant, male, who is retired psychiatrist with known history of ESRD on peritoneal dialysis, hypertension, diabetes mellitus, psoriasis, chronic immunosuppressant, (on Skyrizi), anemia, thrombocytopenia, tubular adenoma of colon, esophagitis, diverticulosis of colon, IBS admitted on 02/16/2024 with septic shock, right lower lobe pneumonia, anion gap metabolic acidosis with elevated lactate level and E. coli bacteremia.  He was treated with Zosyn and vancomycin.  Patient after admission he started having acute watery diarrhea and C. difficile PCR was positive whereas C. difficile toxin was negative with a comment about possible colonization.  Patient is on probiotics and oral vancomycin for presumed C. difficile and a GI consult requested for further evaluation and management.      For last 4 days he is noted to have acute watery diarrhea.  Patient was started on Rifaximin on Monday and diarrhea improved overnight but again had more than 10 BM yesterday.  His stool workup showed fecal lactoferrin is positive.  CRP improving and now from 23.70 to 18.23, ESR was 67.  WBC count back to normal.  Hemoglobin is stable between 10 to 9 g/dL.  LFTs within normal range.  Total protein and albumin is on the lower side with value of 5.5 and 2.5 g/dL.  IgA level normal, tissue transglutaminase negative.  Enteric bacterial pathogen and enteric parasites were negative.  Lab unable to perform complete GI panel, fecal calprotectin, pancreatic elastase and stool electrolytes with a comment that insufficient stool sample.  The request for GI panel, calprotectin, pancreatic elastase and electrolytes are again submitted.  Awaiting for collection     Patient is awake alert and oriented x 3.  He looks more active and communicative.  No asterixis.  He has 2-3 BM of watery in nature. Unable to collect  the stool sample. Discussed with the nursing to collect the stool sample in order to complete the stool workup, especially stool electrolytes to calculate osmolar gap to differentiate between osmotic versus secretory diarrhea.  No nausea, vomiting or abdominal pain      Current Facility-Administered Medications:     !Patient Home Medications Stored on Unit, , Does not apply, BID, Garland Veronica MD    acetaminophen (TYLENOL) tablet 650 mg, 650 mg, Oral, Q4H PRN, 650 mg at 02/20/24 1803 **OR** acetaminophen (TYLENOL) 160 MG/5ML oral solution 650 mg, 650 mg, Oral, Q4H PRN **OR** acetaminophen (TYLENOL) suppository 650 mg, 650 mg, Rectal, Q4H PRN, Garland Veronica MD    cefTRIAXone (ROCEPHIN) 2000 mg/100 mL 0.9% NS IVPB (MBP), 2,000 mg, Intravenous, Q24H, Saúl Vargas MD    dianeal lo-peter 1.5% (DIANEAL) peritoneal dialysate 1,500 mL, 1,500 mL, Intraperitoneal, 4 Exchanges Daily - Dwell Overnight, Garland Veronica MD, 1,500 mL at 02/21/24 1254    heparin (porcine) 5000 UNIT/ML injection 5,000 Units, 5,000 Units, Subcutaneous, Q12H, Garland Veronica MD, 5,000 Units at 02/21/24 0828    levothyroxine (SYNTHROID, LEVOTHROID) tablet 125 mcg, 125 mcg, Oral, Q AM, Saúl Vargas MD, 125 mcg at 02/21/24 0536    Lidocaine (Anorectal) (LMX 5) 5 % cream cream 1 Application, 1 Application, Topical, TID PRN, Saúl Vargas MD    melatonin tablet 5 mg, 5 mg, Oral, Nightly PRN, Garland Veronica MD    midodrine (PROAMATINE) tablet 2.5 mg, 2.5 mg, Oral, TID AC, Saúl Vargas MD, 2.5 mg at 02/21/24 1254    nystatin (MYCOSTATIN) 100,000 unit/mL suspension 500,000 Units, 5 mL, Swish & Swallow, 4x Daily, Coreen Castro MD, 500,000 Units at 02/21/24 1254    ondansetron (ZOFRAN) injection 4 mg, 4 mg, Intravenous, Q6H PRN, Garland Veronica MD    oxyCODONE (ROXICODONE) immediate release tablet 10 mg, 10 mg, Oral, Q8H PRN, Saúl Vargas MD    oxyCODONE (ROXICODONE)  immediate release tablet 5 mg, 5 mg, Oral, Q8H PRN, Saúl Vargas MD    polyethyl glycol-propyl glycol (SYSTANE) 0.4-0.3 % ophthalmic solution (artificial tears) 2 drop, 2 drop, Both Eyes, Q1H PRN, Gabriela Workman PA-C    potassium chloride (MICRO-K/KLOR-CON) CR capsule, 20 mEq, Oral, Daily, Rolf Cha MD, 20 mEq at 02/21/24 0828    riFAXIMin (XIFAXAN) tablet 550 mg, 550 mg, Oral, Q8H, Gonzalez Marroquin MD, 550 mg at 02/21/24 0535    sertraline (ZOLOFT) tablet 100 mg, 100 mg, Oral, Nightly, Gabriela Workman PA-C, 100 mg at 02/20/24 2042    sod bicarb-citric acid-simethicone (GAS-X II) 2.21-1.53-0.04 g packet 1 packet, 1 packet, Oral, Once in imaging, Saúl Vargas MD    sodium bicarbonate tablet 1,300 mg, 1,300 mg, Oral, BID, Garland Veronica MD, 1,300 mg at 02/21/24 0828    sodium chloride 0.9 % flush 10 mL, 10 mL, Intravenous, Q12H, Garland Veronica MD, 10 mL at 02/21/24 0830    sodium chloride 0.9 % flush 10 mL, 10 mL, Intravenous, PRN, Garland Veronica MD    sodium chloride 0.9 % infusion 40 mL, 40 mL, Intravenous, PRN, Garland Veronica MD    vancomycin (VANCOCIN) capsule 125 mg, 125 mg, Oral, Q6H, Saúl Vargas MD, 125 mg at 02/21/24 1254    Objective     Vital Signs  Temp:  [98.2 °F (36.8 °C)-99.5 °F (37.5 °C)] 98.2 °F (36.8 °C)  Heart Rate:  [70-93] 73  Resp:  [18-22] 18  BP: (109-151)/() 140/108  Body mass index is 27.91 kg/m².    Intake/Output Summary (Last 24 hours) at 2/21/2024 1317  Last data filed at 2/21/2024 1147  Gross per 24 hour   Intake 3340 ml   Output 2551.6 ml   Net 788.4 ml     I/O this shift:  In: 1840 [P.O.:340]  Out: 1251.6      Physical Exam:  General     Alert and oriented x 2, normal affect   Head:    Normocephalic, without obvious abnormality, atraumatic   Eyes:          conjunctivae and sclerae normal, no icterus, pupils round and reactive to light   Oral cavity: Moist and intact, normal dentation   Neck:    "Supple, no adenopathy   Chest Wall/Lungs:    No abnormalities observed, equal on expansion bilaterally, No use of extrarespiratory muscles noted   Abdomen:     Soft, nondistended, nontender; normal bowel sounds, no hepatospleenomegaly, no ascites. LUQ with peritoneal dialysis catheter and currently receiving dialysis   Extremities:   no edema, clubbing, or cyanosis   Neurologic: Awake alert and oriented x 3.  Nonfocal.  No asterixis      Results Review:      Results from last 7 days   Lab Units 02/21/24  0451 02/20/24  0518 02/19/24  0501   WBC 10*3/mm3 8.53 9.05 13.99*   HEMOGLOBIN g/dL 9.6* 9.2* 9.9*   HEMATOCRIT % 29.7* 29.1* 30.7*   PLATELETS 10*3/mm3 97* 90* 82*     Results from last 7 days   Lab Units 02/21/24  0451 02/20/24  1931 02/20/24  0518   SODIUM mmol/L 137 136 137   POTASSIUM mmol/L 3.3* 3.0* 3.1*   CHLORIDE mmol/L 101 100 101   CO2 mmol/L 22.0 22.9 22.5   BUN mg/dL 32* 34* 36*   CREATININE mg/dL 4.87* 5.02* 5.24*   CALCIUM mg/dL 8.7 8.6 8.8   BILIRUBIN mg/dL 0.4 0.3 0.3   ALK PHOS U/L 56 66 59   ALT (SGPT) U/L 15 16 14   AST (SGOT) U/L 36 38 31   GLUCOSE mg/dL 129* 150* 124*     Invalid input(s): \"3\"   No results found for: \"LIPASE\"    Radiology:  FL Esophagram w Modified Barium Swallow Single Contrast    Result Date: 2/19/2024  1. Presbyesophagus 2. Prominent cricopharyngeal bar 3. Laryngeal penetration and silent tracheal aspiration with thin liquid barium     HUYEN SANDHU       Electronically Signed and Approved By: Angel Luis Barrientos M.D. on 2/19/2024 at 17:24               Impression:      Acute diarrhea  Fecal lactoferrin positive  Elevated CRP and ESR-multifactorial  Metabolic encephalopathy-resolved  Abnormal barium esophagogram (silent tracheal aspiration)  Thrombocytopenia  Normocytic anemia  Recent E. 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  C. difficile PCR was positive with negative toxin.  Fecal lactoferrin positive, elevated CRP and ESR reflecting towards inflammatory versus secretory diarrhea.  He has been ruled out for enteroinvasive infectious.  etiology, celiac disease and SIBO.      Now likely possibilities are secretory versus inflammatory versus noninvasive infectious versus microscopic colitis causes.  Ideally, he should have colonoscopy with biopsies but given right lower lobe pneumonia and silent tracheal aspiration potential risk are higher.  Will wait for pulmonary clearance.  For now, continue rifaximin.  May consider to discontinue oral vancomycin and add Flagyl.  Once stool electrolytes samples are collected, will give octreotide 200 mcg subcu every 8 hours for 48 hours and at that time the results of electrolytes will be back for definitive treatment.     I discussed the patients findings and my recommendations with patient, family, and nursing staff.    Electronically signed by Gonzalez Marroquin MD, 02/21/24, 1:17 PM EST.

## 2024-02-21 NOTE — PROGRESS NOTES
Pulmonary / Critical Care Progress Note      Patient Name: Nelson Wang  : 1945  MRN: [id number]  Attending:  Saúl Vargas MD   Date of admission: 2024    Subjective   Subjective   Follow-up for septic shock, right lower lobe pneumonia    On room air  Denies any coughing or dyspnea  Diarrhea ongoing.  Being evaluated for this by GI  Swallow evaluation consistent with aspiration  On PD fluid exchanges  On antibiotics for E. coli bacteremia  No fevers noted    Objective   Objective     Vitals:   Vital signs for last 24 hours:  Temp:  [98.3 °F (36.8 °C)-99.5 °F (37.5 °C)] 99.5 °F (37.5 °C)  Heart Rate:  [] 93  Resp:  [18-22] 18  BP: (109-151)/(57-69) 119/69    Intake/Output last 3 shifts:  I/O last 3 completed shifts:  In: 6480 [P.O.:480]  Out: 5148.6   Intake/Output this shift:  No intake/output data recorded.    Physical Exam   Vital Signs Reviewed   WDWN, Alert, NAD.  On room air  HEENT:  PERRL, EOMI.  OP, nares clear  Chest:  good aeration, decreasing right base rhonchi, tympanic to percussion bilaterally, no work of breathing noted  CV: RRR, no MGR, pulses 2+, equal.  Abd:  Soft, NT, ND, + BS, no HSM  EXT:  no clubbing, no cyanosis, no edema  Neuro:  A&Ox3, CN grossly intact, no focal deficits.  Skin: No rashes or lesions noted, PD cath in place    Result Review    Result Review:  I have personally reviewed the results from the time of this admission to 2024 07:43 EST and agree with these findings:  [x]  Laboratory  [x]  Microbiology  [x]  Radiology  []  EKG/Telemetry   [x]  Cardiology/Vascular   []  Pathology  []  Old records  []  Other:  Most notable findings include:   Blood culture 2/ gram-negative bacilli, BC ID E. coli  C. difficile positive                      Lab 24  0451 24  1931 24  0518 24  0501 24  0433 24  0528 24  0230 24  0746   WBC 8.53  --  9.05 13.99*  --  11.50* 9.71 5.34   HEMOGLOBIN 9.6*  --  9.2* 9.9*  --  9.7*  10.0* 10.9*   HEMATOCRIT 29.7*  --  29.1* 30.7*  --  29.9* 32.1* 34.2*   PLATELETS 97*  --  90* 82*  --  75* 102* 102*   SODIUM 137 136 137  --  136 135* 136 135*   POTASSIUM 3.3* 3.0* 3.1*  --  3.3* 3.2* 3.7 3.1*   CHLORIDE 101 100 101  --  99 97* 99 95*   CO2 22.0 22.9 22.5  --  21.5* 22.6 19.6* 24.2   BUN 32* 34* 36*  --  42* 43* 44* 38*   CREATININE 4.87* 5.02* 5.24*  --  5.54* 5.85* 6.62* 7.22*   GLUCOSE 129* 150* 124*  --  115* 78 162* 96   CALCIUM 8.7 8.6 8.8  --  8.9 8.6 7.7* 8.3*   PHOSPHORUS 2.8 3.0 3.2  --  3.4 3.7 5.3*  --    TOTAL PROTEIN 5.4* 5.8* 5.5*  --  5.9* 5.5*  --  5.7*   ALBUMIN 2.5* 2.5* 2.5*  --  2.5* 2.6*  --  3.0*   GLOBULIN  --  3.3  --   --  3.4 2.9  --  2.7     CT Chest Without Contrast Diagnostic    Result Date: 2/18/2024  PROCEDURE: CT CHEST WO CONTRAST DIAGNOSTIC  COMPARISON: [facility], CT, CT CHEST WO CONTRAST DIAGNOSTIC, 12/06/2023, 11:11.  [facility], CT, CT ABDOMEN PELVIS WO CONTRAST, 2/16/2024, 14:16.  INDICATIONS: SHORTNESS OF BREATH  PROTOCOL:   Standard imaging protocol performed    RADIATION:   DLP: 313 mGy*cm   Automated exposure control was utilized to minimize radiation dose.  TECHNIQUE: Axial images of the chest without intravenous contrast.  FINDINGS: Slight improvement in right lower lobe airspace consolidation.  Areas of ground-glass opacity in the right mid and lower lung field appear stable.  No cavitary lesions are identified.  Stable pneumoperitoneum and abdominal free fluid likely related to peritoneal dialysis.  Coronary artery calcification is present.  The thoracic aorta has a normal caliber.  There is no mediastinal, axillary or hilar adenopathy.  Right IJ central line tip is in the SVC.  There is mild gynecomastia.  No evidence of pneumothorax.  No evidence of significant pleural or pericardial effusion.  Degenerative changes are present in the thoracic spine.  IMPRESSION:  Slight improvement in the right lower lobe  airspace consolidation.  EPIFANIO RENTERIA MD       Electronically Signed and Approved By: EPIFANIO RENTERIA MD on 2/18/2024 at 10:04                Assessment & Plan   Assessment / Plan     Active Hospital Problems:  Active Hospital Problems    Diagnosis     **Peritonitis      Impression:  Septic shock, present on admission  Right lower lobe pneumonia of unspecified organism  E. coli bacteremia  C. difficile colitis  Aspiration pneumonia of right lower lobe.  Suspect related to E. coli  Acute on chronic diarrhea  Clinically significant lactic acidosis  ESRD on peritoneal dialysis  Anemia of CKD  Hypokalemia  Anion gap metabolic acidosis  Psoriasis on chronic immunosuppression    Plan:  -Patient currently on room air  -Continue aspiration precautions.  Swallow study consistent with aspiration  -Continue ceftriaxone for E. coli bacteremia  -Continue oral vancomycin for C. difficile colitis  -Diarrhea management per GI.  Appreciate assistance  -Repeat noncontrast chest CT in 3 months as outpatient  -Echocardiogram consistent with diastolic dysfunction  -Continue midodrine 5 mg 3 times daily  -Nephrology following, PD per their service  -Trend renal panel and electrolytes.  Replace potassium orally  -Encourage activity and incentive spirometer use    DVT prophylaxis:  Medical and mechanical DVT prophylaxis orders are present.    CODE STATUS:   Level Of Support Discussed With: Patient  Code Status (Patient has no pulse and is not breathing): CPR (Attempt to Resuscitate)  Medical Interventions (Patient has pulse or is breathing): Full Support      Labs, imaging, microbiology, notes and medications personally reviewed  Discussed with primary    Electronically signed by Scottie Valentin MD, 02/21/24, 4:09 PM EST.

## 2024-02-21 NOTE — CONSULTS
Midline Line Insertion Procedure Note    Procedure: Insertion of Midline Catheter    Indications:  Long Term IV therapy, Home IV therapy    Procedure Details   Sterile technique was used including antiseptics, cap, gloves, hand hygiene, mask, and sheet.    #20G/10CM PowerGlide inserted to the R Cephalic vein per hospital protocol.   Blood return:  yes    Findings:  Catheter inserted to 10 cm, with 0 cm. Exposed.   Catheter was flushed with 20 cc NS. Patient did tolerate procedure well.    LOT:[id number]  Expiration date:12/2024    Recommendations:  Midline Brochure given to patient with teaching instruction.     Rigo Giles, HIREN

## 2024-02-21 NOTE — THERAPY TREATMENT NOTE
Acute Care - Speech Language Pathology   Swallow Treatment Note  [facility]     Patient Name: Nelson Wang  : 1945  MRN: [id number]  Today's Date: 2024               Admit Date: 2024    Visit Dx:     ICD-10-CM ICD-9-CM   1. Febrile illness  R50.9 780.60   2. Weakness  R53.1 780.79   3. End stage renal disease  N18.6 585.6   4. Sepsis, due to unspecified organism, unspecified whether acute organ dysfunction present  A41.9 038.9     995.91   5. Dysphagia, unspecified type  R13.10 787.20   6. Difficulty in walking  R26.2 719.7     Patient Active Problem List   Diagnosis    Essential hypertension    Bradycardia, sinus    Anemia due to chronic kidney disease    Hypothyroidism    Idiopathic gout    Proteinuria    Psoriasis    Thrombocytopenia    Type 2 diabetes mellitus without complication    CKD (chronic kidney disease), stage IV    Hyperlipidemia    Monoclonal gammopathy of unknown significance (MGUS)    Stage 5 chronic kidney disease    Peritoneal dialysis catheter in place    Chronic gout without tophus    Peritoneal dialysis catheter dysfunction    Medicare annual wellness visit, subsequent    Chronic cough    Peritonitis     Past Medical History:   Diagnosis Date    Anemia     Ankle pain, right     CKD (chronic kidney disease), stage IV     Diabetes     Gout     High cholesterol     HL (hearing loss)     Hyperkalemia     Hypertension     Hypothyroidism     Psoriasis     Vitiligo      Past Surgical History:   Procedure Laterality Date    ANKLE SURGERY  1990    APPENDECTOMY N/A     COLONOSCOPY N/A 2022    [facility]    HIP BIPOLAR REPLACEMENT Right 2000    INSERTION PERITONEAL DIALYSIS CATHETER N/A 3/27/2023    Procedure: LAPAROSCOPIC INSERTION PERITONEAL DIALYSIS CATHETER, LAPAROSCOPIC OMENTOPEXY WITH LYSIS OF ADHESIONS;  Surgeon: Jose Berry MD;  Location: [facility];  Service: General;  Laterality: N/A;    INSERTION PERITONEAL DIALYSIS CATHETER Left 2023     Procedure: REVISION OF PERITONEAL DIALYSIS CATHETER;  Surgeon: Radha Oreilly MD;  Location: [facility];  Service: General;  Laterality: Left;    RENAL BIOPSY Left 07/15/2022       SPEECH PATHOLOGY DYSPHAGIA TREATMENT    Subjective/Behavioral Observations: Alert and cooperative, reclined in bed.      Day/time of Treatment: 2/21/2024      Current Diet: Soft to chew diet with chopped meats, nectar thickened liquid.      Current Strategies: Alternate small bites and small sips of solids and liquids at a slow rate.  Small single sips of liquid with no straw.  Medications whole in applesauce. Reflux precautions.       Treatment received: Dysphagia therapy to address swallow function through exercises and education of strategies.      Results of treatment: Patient and wife educated for results of modified barium swallow study.  Patient educated for result with silent aspiration and risk of pneumonia.  Patient educated for changes to diet, reasoning behind nectar thickened liquid.  Patient taking sips of nectar liquid from cup with min mod cueing for strategies.  Patient requiring cueing for positioning.      Progress toward goals: Minimal      Barriers to Achieving goals: N/A      Plan of care:/changes in plan: Continue per current plan.  Continue with diet of soft to chew solids with nectar thickened liquids by cup.  No straw.  Occasions whole in applesauce.                                                                                             Plan of Care Reviewed With: patient          EDUCATION  The patient has been educated in the following areas:   Modified Diet Instruction.              Time Calculation:    Time Calculation- SLP       Row Name 02/21/24 1018             Time Calculation- SLP    SLP Stop Time 0930  -TB      SLP Received On 02/21/24  -TB         Untimed Charges    97101-VU Treatment Swallow Minutes 40  -TB         Total Minutes    Untimed Charges Total Minutes 40  -TB       Total Minutes  40  -TB                User Key  (r) = Recorded By, (t) = Taken By, (c) = Cosigned By      Initials Name Provider Type    TB Katie Isaacs SLP Speech and Language Pathologist                    Therapy Charges for Today       Code Description Service Date Service Provider Modifiers Qty    [id number]  ST TREATMENT SWALLOW 3 2/21/2024 Katie Isaacs SLP GN 1                 ADILSON Álvarez  2/21/2024

## 2024-02-21 NOTE — PLAN OF CARE
Goal Outcome Evaluation:         Patient is laying in bed with  eyes open. PD dialysis has been completed. Patient does not have concerns at this time and no signs of distress present.

## 2024-02-21 NOTE — PROGRESS NOTES
[facility]   Hospitalist Progress Note  Date: 2024  Patient Name: Nelson Wang  : 1945  MRN: [id number]  Date of admission: 2024      Subjective   Subjective   Chief complaint: Fever and weakness    Summary:  79-year-old male with history of ESRD on peritoneal dialysis, hypertension, diabetes mellitus, dyslipidemia, hypothyroidism, hyperuricemia/gout, vitiligo, immunosuppressed on Skyrizi, hospitalized on 2024 with chief complaint of fevers and weakness, found to be in septic shock due to E. coli bacteremia and right lower lobe pneumonia, with C. difficile positive stools, with concern for acute peritonitis, placed on broad-spectrum antibiotics, nephrology and critical care consulted, required Levophed, central line placed, Levophed weaned, transferred out of the ICU after starting midodrine, C. difficile positive stools, vancomycin orally started, GI consulted with persistent ongoing diarrhea.  Speech therapy consulted with concern for aspiration, modified barium swallow indicated laryngeal penetration and silent aspiration with thin liquids, recommended nectar thick solids, no straws    Interval follow-up: Seen and examined this morning, was in no acute distress, no acute major overnight events, d diarrhea has subsided some but continues to have frequent bowel movements.  Perianal area remains raw and excoriated.  Difficulty sitting flat.  Remains in a pleasant mood.  Denies coughing after drinking thin liquids.  No nausea or vomiting.  No fevers, chills, sweats.  Telemetry reviewed, no acute major rhythmic events.  White blood cell count 8000, hemoglobin 9.6, creatinine 4.87, BUN 32, potassium 3.3.  Enteric panel for bacteria and parasites from his stool is negative.  Gastrointestinal stool panel is pending.    Review of systems:  All systems reviewed negative except for ongoing diarrhea, weakness, generalized fatigue, excoriation of perianal skin    Objective   Objective     Vitals:    Temp:  [98.2 °F (36.8 °C)-99.8 °F (37.7 °C)] 99.8 °F (37.7 °C)  Heart Rate:  [70-93] 75  Resp:  [18-22] 18  BP: (109-140)/() 140/79  Physical Exam      Constitutional: Awake, alert, no acute distress sitting upright at the edge of the bed about to eat breakfast   Eyes: Pupils equal, sclerae anicteric, no conjunctival injection   HENT: NCAT, mucous membranes moist   Neck: Supple, full range of motion  Respiratory: Diminished coarse breath sounds mostly on the right side lower lobe   Cardiovascular: RRR, no murmurs, rubs, or gallops, palpable pedal pulses bilaterally   Gastrointestinal: Positive bowel sounds, soft, nontender, distended, PD catheter in place while going fluid exchange   Musculoskeletal: No bilateral ankle edema, no clubbing or cyanosis to extremities   Psychiatric: Appropriate affect, cooperative   Neurologic: Oriented x 3, strength symmetric in all extremities, Cranial Nerves grossly intact to confrontation, speech clear   Skin: No rashes visible on exposed skin; rectal exam deferred      Result Review    Result Review:  I have personally reviewed the pertinent results from the past 24 hours to 2/21/2024 15:16 EST and agree with these findings:  [x]  Laboratory   CBC          2/19/2024    05:01 2/20/2024    05:18 2/21/2024    04:51   CBC   WBC 13.99  9.05  8.53    RBC 3.25  3.08  3.13    Hemoglobin 9.9  9.2  9.6    Hematocrit 30.7  29.1  29.7    MCV 94.5  94.5  94.9    MCH 30.5  29.9  30.7    MCHC 32.2  31.6  32.3    RDW 15.1  15.3  15.8    Platelets 82  90  97      BMP          2/19/2024    04:33 2/20/2024    05:18 2/20/2024    19:31 2/21/2024    04:51   BMP   BUN 42  36  34  32    Creatinine 5.54  5.24  5.02  4.87    Sodium 136  137  136  137    Potassium 3.3  3.1  3.0  3.3    Chloride 99  101  100  101    CO2 21.5  22.5  22.9  22.0    Calcium 8.9  8.8  8.6  8.7      LIVER FUNCTION TESTS:      Lab 02/21/24  0451 02/20/24  1931 02/20/24  0518 02/19/24  0433 02/18/24  0528 02/16/24  0746    TOTAL PROTEIN 5.4* 5.8* 5.5* 5.9* 5.5* 5.7*   ALBUMIN 2.5* 2.5* 2.5* 2.5* 2.6* 3.0*   GLOBULIN  --  3.3  --  3.4 2.9 2.7   ALT (SGPT) 15 16 14 15 13 9   AST (SGOT) 36 38 31 30 30 15   BILIRUBIN 0.4 0.3 0.3 0.4 0.5 0.6   INDIRECT BILIRUBIN 0.2  --  0.1  --   --   --    BILIRUBIN DIRECT 0.2  --  0.2  --   --   --    ALK PHOS 56 66 59 67 50 60       [x]  Microbiology   Microbiology Results (last 10 days)       Procedure Component Value - Date/Time    Enteric Bacterial Panel - Stool, Per Rectum [[id number]]  (Normal) Collected: 02/20/24 1121    Lab Status: Final result Specimen: Stool from Per Rectum Updated: 02/21/24 0946     Salmonella Not Detected     Campylobacter Not Detected     Shigella/Enteroinvasive E. coli (EIEC) Not Detected     Shiga-like toxin-producing E. coli (STEC) stx1/stx2 Not Detected    Enteric Parasite Panel - Stool, Per Rectum [[id number]]  (Normal) Collected: 02/20/24 1121    Lab Status: Final result Specimen: Stool from Per Rectum Updated: 02/21/24 0951     Giardia lamblia Not Detected     Cryptosporidium Not Detected     Entamoeba histolytica Not Detected    Blood Culture - Blood, Hand, Right [[id number]]  (Normal) Collected: 02/18/24 1441    Lab Status: Preliminary result Specimen: Blood from Hand, Right Updated: 02/21/24 1515     Blood Culture No growth at 3 days    Blood Culture - Blood, Neck [[id number]]  (Normal) Collected: 02/18/24 1440    Lab Status: Preliminary result Specimen: Blood from Neck Updated: 02/21/24 1515     Blood Culture No growth at 3 days    Clostridioides difficile Toxin - Stool, Per Rectum [[id number]]  (Abnormal) Collected: 02/16/24 2010    Lab Status: Final result Specimen: Stool from Per Rectum Updated: 02/16/24 2138    Narrative:      The following orders were created for panel order Clostridioides difficile Toxin - Stool, Per Rectum.  Procedure                               Abnormality         Status                     ---------                                -----------         ------                     Clostridioides difficile...[[id number]]  Abnormal            Final result                 Please view results for these tests on the individual orders.    Clostridioides difficile Toxin, PCR - Stool, Per Rectum [[id number]]  (Abnormal) Collected: 02/16/24 2010    Lab Status: Final result Specimen: Stool from Per Rectum Updated: 02/16/24 2138     Toxigenic C. difficile by PCR Positive     027 Toxin Presumptive Negative    Narrative:      DNA from a toxigenic strain of C.difficile has been detected. Antigen testing for the presence of free C.difficile toxin is currently in progress, to help determine the clinical significance of this PCR result.     Enteric Bacterial Panel - Stool, Per Rectum [[id number]]  (Normal) Collected: 02/16/24 2010    Lab Status: Final result Specimen: Stool from Per Rectum Updated: 02/17/24 1248     Salmonella Not Detected     Campylobacter Not Detected     Shigella/Enteroinvasive E. coli (EIEC) Not Detected     Shiga-like toxin-producing E. coli (STEC) stx1/stx2 Not Detected    Clostridioides difficile toxin Ag, Reflex - Stool, Per Rectum [[id number]]  (Normal) Collected: 02/16/24 2010    Lab Status: Final result Specimen: Stool from Per Rectum Updated: 02/16/24 2139     C.diff Toxin Ag Negative    Narrative:      DNA from a toxigenic strain of C.difficile was detected, although the free toxin itself was not detected. These findings are consistent with C.difficile colonization and may not reflect actual C.difficile infection. Clinical correlation needed.    MRSA Screen, PCR (Inpatient) - Swab, Nares [[id number]]  (Normal) Collected: 02/16/24 1447    Lab Status: Final result Specimen: Swab from Nares Updated: 02/16/24 1635     MRSA PCR No MRSA Detected    Narrative:      The negative predictive value of this diagnostic test is high and should only be used to consider de-escalating anti-MRSA therapy. A positive result may indicate colonization with  MRSA and must be correlated clinically.    Respiratory Panel PCR w/COVID-19(SARS-CoV-2) RA/TANIA/GAVIN/PAD/COR/NENA In-House, NP Swab in UTM/VTM, 2 HR TAT - Swab, Nasopharynx [[id number]]  (Normal) Collected: 02/16/24 1447    Lab Status: Final result Specimen: Swab from Nasopharynx Updated: 02/16/24 1622     ADENOVIRUS, PCR Not Detected     Coronavirus 229E Not Detected     Coronavirus HKU1 Not Detected     Coronavirus NL63 Not Detected     Coronavirus OC43 Not Detected     COVID19 Not Detected     Human Metapneumovirus Not Detected     Human Rhinovirus/Enterovirus Not Detected     Influenza A PCR Not Detected     Influenza B PCR Not Detected     Parainfluenza Virus 1 Not Detected     Parainfluenza Virus 2 Not Detected     Parainfluenza Virus 3 Not Detected     Parainfluenza Virus 4 Not Detected     RSV, PCR Not Detected     Bordetella pertussis pcr Not Detected     Bordetella parapertussis PCR Not Detected     Chlamydophila pneumoniae PCR Not Detected     Mycoplasma pneumo by PCR Not Detected    Narrative:      In the setting of a positive respiratory panel with a viral infection PLUS a negative procalcitonin without other underlying concern for bacterial infection, consider observing off antibiotics or discontinuation of antibiotics and continue supportive care. If the respiratory panel is positive for atypical bacterial infection (Bordetella pertussis, Chlamydophila pneumoniae, or Mycoplasma pneumoniae), consider antibiotic de-escalation to target atypical bacterial infection.    Legionella Antigen, Urine - Urine, Urine, Clean Catch [[id number]]  (Normal) Collected: 02/16/24 1127    Lab Status: Final result Specimen: Urine, Clean Catch Updated: 02/16/24 1322     LEGIONELLA ANTIGEN, URINE Negative    S. Pneumo Ag Urine or CSF - Urine, Urine, Clean Catch [[id number]]  (Normal) Collected: 02/16/24 1127    Lab Status: Final result Specimen: Urine, Clean Catch Updated: 02/16/24 1322     Strep Pneumo Ag Negative    Body Fluid  Culture - Body Fluid, Peritoneum [[id number]] Collected: 02/16/24 1030    Lab Status: Final result Specimen: Body Fluid from Peritoneum Updated: 02/19/24 0852     Body Fluid Culture No growth at 3 days     Gram Stain No WBCs seen      No organisms seen    Blood Culture - Blood, Arm, Left [[id number]]  (Abnormal) Collected: 02/16/24 0937    Lab Status: Final result Specimen: Blood from Arm, Left Updated: 02/18/24 0922     Blood Culture Escherichia coli     Isolated from Aerobic and Anaerobic Bottles     Gram Stain Anaerobic Bottle Gram negative bacilli      Aerobic Bottle Gram negative bacilli    Narrative:      Refer to previous blood culture collected on 2/16 for andreas's      COVID-19, FLU A/B, RSV PCR 1 HR TAT - Swab, Nasopharynx [[id number]]  (Normal) Collected: 02/16/24 0753    Lab Status: Final result Specimen: Swab from Nasopharynx Updated: 02/16/24 0833     COVID19 Not Detected     Influenza A PCR Not Detected     Influenza B PCR Not Detected     RSV, PCR Not Detected    Narrative:      Fact sheet for providers: https://www.fda.gov/media/320883/download    Fact sheet for patients: https://www.fda.gov/media/675679/download    Test performed by PCR.    Blood Culture - Blood, Arm, Right [[id number]]  (Abnormal)  (Susceptibility) Collected: 02/16/24 0746    Lab Status: Final result Specimen: Blood from Arm, Right Updated: 02/18/24 0922     Blood Culture Escherichia coli     Isolated from Aerobic and Anaerobic Bottles     Gram Stain Aerobic Bottle Gram negative bacilli      Anaerobic Bottle Gram negative bacilli    Susceptibility        Escherichia coli      ANDREAS      Amoxicillin + Clavulanate Susceptible      Ampicillin Resistant      Ampicillin + Sulbactam Resistant      Cefepime Susceptible      Ceftazidime Susceptible      Ceftriaxone Susceptible      Gentamicin Susceptible      Levofloxacin Resistant      Piperacillin + Tazobactam Susceptible      Trimethoprim + Sulfamethoxazole Susceptible                        Susceptibility Comments       Escherichia coli    Cefazolin sensitivity will not be reported for Enterobacteriaceae in non-urine isolates. If cefazolin is preferred, please call the microbiology lab to request an E-test.  With the exception of urinary-sourced infections, aminoglycosides should not be used as monotherapy.               Blood Culture ID, PCR - Blood, Arm, Right [[id number]]  (Abnormal) Collected: 02/16/24 0746    Lab Status: Final result Specimen: Blood from Arm, Right Updated: 02/16/24 2146     BCID, PCR Escherichia coli. Identification by BCID2 PCR.     BOTTLE TYPE Aerobic Bottle    Narrative:      No resistance genes detected.              [x]  Radiology FL Esophagram w Modified Barium Swallow Single Contrast    Result Date: 2/19/2024  1. Presbyesophagus 2. Prominent cricopharyngeal bar 3. Laryngeal penetration and silent tracheal aspiration with thin liquid barium     HUYEN SANDHU       Electronically Signed and Approved By: Angel Luis Barrientos M.D. on 2/19/2024 at 17:24             CT Abdomen Pelvis Without Contrast    Result Date: 2/16/2024    1. Extensive right lower lobe pneumonia. 2. Pneumoperitoneum similar to prior chest CT likely related to peritoneal dialysis catheter.  No organized fluid collection or abscess. 3. Nonobstructing 11 mm calculus at the right renal pelvis unchanged. 4. Additional chronic findings above.     LISY SANTILLAN MD       Electronically Signed and Approved By: LISY SANTILLAN MD on 2/16/2024 at 14:46             XR Chest 1 View    Result Date: 2/16/2024   Right lower lobe airspace opacity consistent with pneumonia.  Pneumoperitoneum, better seen on same day CT of the abdomen/pelvis.       JOSH HAMPTON MD       Electronically Signed and Approved By: JOSH HAMPTON MD on 2/16/2024 at 14:30             XR Chest 1 View    Result Date: 2/16/2024    No acute process.       LISY SANTILLAN MD       Electronically Signed and Approved By: LISY SANTILLAN MD on 2/16/2024 at 8:07                 [x]  EKG/Telemetry   No orders to display       [x]  Cardiology/Vascular   []  Pathology  [x]  Old records  []  Other:    Assessment & Plan   Assessment / Plan     Assessment/Plan:    Assessment:  Septic shock due to E. coli bacteremia  E. coli bacteremia  Right lower lobe pneumonia likely due to E. coli likely due to silent aspiration  Psoriasis on chronic immunosuppression  C. difficile colitis and diarrhea  Acute peritonitis  Anion gap metabolic acidosis  Lactic acidosis  ESRD on PD  Hypokalemia  Diabetes mellitus with insulin use  Essential hypertension  Dyslipidemia  Hypothyroidism  Hyperuricemia/gout  Anemia of chronic kidney disease  Thrombocytopenia    Plan:  Labs and imaging reviewed  Discontinue telemetry  Transfer to regular floor  Stop midodrine and monitor blood pressures  Discussed with wound care, changes to wound care regimen to help with excoriated skin perianally  Continue vancomycin oral 125 mg every 6 hours  GI consulted Dr. Marroquin, recommendations appreciated; continued rifaximin, await stool electrolytes samples, started octreotide 200 mcg subcu every 8 hours for the next 48 hours after stool samples provided, ultimately needs colonoscopy in the future.  Will check TSH in the morning  Continue levothyroxine 125 mcg daily  Continue ceftriaxone 2 g daily IV will need to complete 2 weeks total  Midline placement today  Renal diet, nectar thick liquids  Patient assumes responsibility and risk of aspiration   Zofran for nausea  Melatonin at night to help with sleep  Prophylactic nystatin swish and swallow  Sodium bicarb tabs 1300 mg twice a day continued  Dialysis fluid exchange per nephrology  A.m. labs  Full code  PT/OT  DVT prophylaxis with heparin  Discussed with nurse at the bedside    DVT prophylaxis:  Medical and mechanical DVT prophylaxis orders are present.        CODE STATUS:   Level Of Support Discussed With: Patient  Code Status (Patient has no pulse and is not breathing): CPR  (Attempt to Resuscitate)  Medical Interventions (Patient has pulse or is breathing): Full Support    Electronically signed by Saúl Vargas MD, 02/21/24, 3:21 PM EST.  Portions of this documentation were transcribed electronically from a voice recognition software.  I confirm all data accurately represents the service(s) I performed at today's visit.

## 2024-02-21 NOTE — PROGRESS NOTES
[facility]     Nephrology Progress Note      Patient Name: Nelson Wang  : 1945  MRN: [id number]  Primary Care Physician:  Janna Mo MD  Date of admission: 2024    Subjective   Subjective     Interval History:  Patient Reports feeling somewhat better.  No trouble with PD.  Diarrhea may be easing up.  Tolerating some p.o.  Silent aspiration noted with thins.    Review of Systems   All systems were reviewed and negative except for: What is mentioned above.    Objective   Objective     Vitals:   Temp:  [98.2 °F (36.8 °C)-99.8 °F (37.7 °C)] 99.8 °F (37.7 °C)  Heart Rate:  [70-93] 75  Resp:  [18-22] 18  BP: (109-140)/() 140/79  Physical Exam:   Constitutional: Awake, alert, no distress, conversant and pleasant.   Eyes: sclerae anicteric, no conjunctival injection, some pallor noted.   HENT: mucous membranes moist   Neck: Supple, no thyromegaly, no lymphadenopathy, trachea midline, No JVD   Respiratory: Decreased to auscultation bilaterally, nonlabored respirations    Cardiovascular: RRR, no murmurs, rubs, or gallops.   Gastrointestinal: Positive bowel sounds, soft, nontender, nondistended, site of PD catheter without drainage, erythema or tenderness.   Musculoskeletal: No edema, no clubbing or cyanosis   Psychiatric: Appropriate affect, cooperative   Neurologic: Oriented x 3, moving all extremities, Cranial Nerves grossly intact, speech clear   Skin: warm and dry, no rashes, + vitiligo    Result Review    Result Reviewed:  I have personally reviewed the results from the time of this admission to 2024 16:41 EST and agree with these findings:  [x]  Laboratory  []  Microbiology  [x]  Radiology  []  EKG/Telemetry   []  Cardiology/Vascular   []  Pathology  []  Old records  []  Other:        Lab 24  0451 24  1931 24  0518 24  0433 24  0528 24  0230 24  0746   SODIUM 137 136 137 136 135* 136 135*   POTASSIUM 3.3* 3.0* 3.1* 3.3* 3.2* 3.7 3.1*    CHLORIDE 101 100 101 99 97* 99 95*   CO2 22.0 22.9 22.5 21.5* 22.6 19.6* 24.2   BUN 32* 34* 36* 42* 43* 44* 38*   CREATININE 4.87* 5.02* 5.24* 5.54* 5.85* 6.62* 7.22*   GLUCOSE 129* 150* 124* 115* 78 162* 96   EGFR 11.5* 11.1* 10.6* 9.9* 9.2* 8.0* 7.2*   ANION GAP 14.0 13.1 13.5 15.5* 15.4* 17.4* 15.8*   MAGNESIUM 1.9 2.2 2.3 2.6* 3.0* 1.1*  --    PHOSPHORUS 2.8 3.0 3.2 3.4 3.7 5.3*  --              Assessment & Plan   Assessment / Plan       Active Hospital Problems:  Active Hospital Problems    Diagnosis     **Peritonitis        Assessment and Plan:    - ESRD, on peritoneal dialysis.  Volume status is adequate.  Electrolytes are as noted above.  Continue 1.5 L, 1.5% dextrose solution 4 times a day.  Monitor closely.  continue nystatin swish and swallow while on antibiotics for fungal peritonitis prevention.  - Severe, watery diarrhea, being evaluated by GI.  May be better.  - E. coli sepsis, source is unclear to me, possibly related to GI translocation or pneumonia.  On therapy.  No evidence of peritonitis.  - Hypokalemia, being replaced.  Liberalize diet.  Add protein supplements/shakes if able to tolerate.  Magnesium okay.    - Anemia of chronic kidney disease, was on long-acting RONALD as outpatient.  Last dose of Mircera 12/5/2023.  Hemoglobin is stable. Will check iron studies if hemoglobin is worse.  - Thrombocytopenia, slightly better, no bleeding, monitor.  - Hypothyroidism, on therapy, per primary.  - Metabolic acidosis, on p.o. bicarb.  Monitor.  - Right lower lobe pneumonia, per primary.  Discussed with family and nursing staff.  Will follow.    Electronically signed by Coreen Castro MD, 02/21/24, 4:44 PM EST.

## 2024-02-21 NOTE — THERAPY EVALUATION
Acute Care - Physical Therapy Initial Evaluation   Nate     Patient Name: Nelson Wang  : 1945  MRN: [id number]  Today's Date: 2024 Admit date: 2024     Referring Physician: Saúl Vargas MD     Surgery Date:* No surgery found *          Visit Dx:     ICD-10-CM ICD-9-CM   1. Febrile illness  R50.9 780.60   2. Weakness  R53.1 780.79   3. End stage renal disease  N18.6 585.6   4. Sepsis, due to unspecified organism, unspecified whether acute organ dysfunction present  A41.9 038.9     995.91   5. Dysphagia, unspecified type  R13.10 787.20   6. Difficulty in walking  R26.2 719.7     Patient Active Problem List   Diagnosis    Essential hypertension    Bradycardia, sinus    Anemia due to chronic kidney disease    Hypothyroidism    Idiopathic gout    Proteinuria    Psoriasis    Thrombocytopenia    Type 2 diabetes mellitus without complication    CKD (chronic kidney disease), stage IV    Hyperlipidemia    Monoclonal gammopathy of unknown significance (MGUS)    Stage 5 chronic kidney disease    Peritoneal dialysis catheter in place    Chronic gout without tophus    Peritoneal dialysis catheter dysfunction    Medicare annual wellness visit, subsequent    Chronic cough    Peritonitis     Past Medical History:   Diagnosis Date    Anemia     Ankle pain, right     CKD (chronic kidney disease), stage IV     Diabetes     Gout     High cholesterol     HL (hearing loss)     Hyperkalemia     Hypertension     Hypothyroidism     Psoriasis     Vitiligo      Past Surgical History:   Procedure Laterality Date    ANKLE SURGERY  1990    APPENDECTOMY N/A     COLONOSCOPY N/A 2022    [facility]    HIP BIPOLAR REPLACEMENT Right 2000    INSERTION PERITONEAL DIALYSIS CATHETER N/A 3/27/2023    Procedure: LAPAROSCOPIC INSERTION PERITONEAL DIALYSIS CATHETER, LAPAROSCOPIC OMENTOPEXY WITH LYSIS OF ADHESIONS;  Surgeon: Jose Berry MD;  Location: [facility] MAIN OR;  Service: General;  Laterality:  N/A;    INSERTION PERITONEAL DIALYSIS CATHETER Left 7/23/2023    Procedure: REVISION OF PERITONEAL DIALYSIS CATHETER;  Surgeon: Radha Oreilly MD;  Location: [facility];  Service: General;  Laterality: Left;    RENAL BIOPSY Left 07/15/2022     PT Assessment (last 12 hours)       PT Evaluation and Treatment       Row Name 02/21/24 1014          Physical Therapy Time and Intention    Subjective Information no complaints (P)   -NM     Document Type evaluation (P)   -NM     Mode of Treatment individual therapy;physical therapy (P)   -NM     Patient Effort excellent (P)   -NM     Symptoms Noted During/After Treatment none (P)   -NM       Row Name 02/21/24 1014          General Information    Patient Profile Reviewed yes (P)   -NM     Patient Observations cooperative;agree to therapy (P)   -NM     Prior Level of Function independent: (P)   -NM     Equipment Currently Used at Home none (P)   -NM     Existing Precautions/Restrictions no known precautions/restrictions (P)   -NM     Barriers to Rehab none identified (P)   -NM       Row Name 02/21/24 1014          Living Environment    Current Living Arrangements home (P)   -NM     Home Accessibility stairs to enter home (P)   -NM     People in Home spouse;child(laura), adult (P)   -NM     Primary Care Provided by self (P)   -NM       Row Name 02/21/24 1014          Home Main Entrance    Number of Stairs, Main Entrance one (P)   -NM     Stair Railings, Main Entrance none (P)   -NM       Row Name 02/21/24 1014          Home Use of Assistive/Adaptive Equipment    Equipment Currently Used at Home none (P)   -NM       Row Name 02/21/24 1014          Pain    Pretreatment Pain Rating 0/10 - no pain (P)   -NM     Posttreatment Pain Rating 0/10 - no pain (P)   -NM       Row Name 02/21/24 1014          Range of Motion (ROM)    Range of Motion bilateral lower extremities;ROM is WFL (P)   -NM       Row Name 02/21/24 1014          Strength (Manual Muscle Testing)    Strength (Manual  Muscle Testing) bilateral lower extremities (P)   5/5  -NM       [facility] Name 02/21/24 1014          Bed Mobility    Bed Mobility bed mobility (all) activities (P)   -NM     All Activities, [place] (Bed Mobility) independent (P)   -NM       Row Name 02/21/24 1014          Transfers    Transfers sit-stand transfer;stand-sit transfer;stand pivot/stand step transfer (P)   -NM       Row Name 02/21/24 1014          Sit-Stand Transfer    Sit-Stand [place] (Transfers) standby assist (P)   -NM     Assistive Device (Sit-Stand Transfers) walker, front-wheeled (P)   -NM       Row Name 02/21/24 1014          Stand-Sit Transfer    Stand-Sit [place] (Transfers) standby assist (P)   -NM     Assistive Device (Stand-Sit Transfers) walker, front-wheeled (P)   -NM       Row Name 02/21/24 1014          Stand Pivot/Stand Step Transfer    Stand Pivot/Stand Step [place] (Transfers) standby assist (P)   -NM     Assistive Device (Stand Pivot Stand Step Transfer) walker, front-wheeled (P)   -NM       Row Name 02/21/24 1014          Gait/Stairs (Locomotion)    Gait/Stairs Locomotion gait/ambulation assistive device (P)   -NM     [place] Level (Gait) standby assist (P)   -NM     Assistive Device (Gait) walker, front-wheeled (P)   -NM     Patient was able to Ambulate yes (P)   -NM     Distance in Feet (Gait) 200 (P)   -NM     Pattern (Gait) step-through (P)   -NM     Deviations/Abnormal Patterns (Gait) bilateral deviations;base of support, wide;weight shifting decreased (P)   -NM     Bilateral Gait Deviations forward flexed posture (P)   -NM       [facility] Name 02/21/24 1014          Safety Issues, Functional Mobility    Impairments Affecting Function (Mobility) endurance/activity tolerance (P)   -NM       Row Name 02/21/24 1014          Balance    Balance Assessment standing dynamic balance (P)   -NM     Dynamic Standing Balance standby assist (P)   -NM     Position/Device Used, Standing Balance walker, front-wheeled (P)   -NM        Row Name 02/21/24 1014          Plan of Care Review    Plan of Care Reviewed With patient (P)   -NM     Progress improving (P)   -NM     Outcome Evaluation Pt presents to therapy today with some endurance deficits, but ability to ambulate and transfer safely. Pt is safe to d/c home. (P)   -NM       Row Name 02/21/24 1014          Positioning and Restraints    Pre-Treatment Position in bed (P)   -NM     Post Treatment Position bed (P)   -NM     In Bed side lying right;call light within reach;encouraged to call for assist;exit alarm on (P)   -NM       Row Name 02/21/24 1014          Therapy Assessment/Plan (PT)    Therapy Frequency (PT) evaluation only (P)   -NM       Row Name 02/21/24 1014          PT Evaluation Complexity    History, PT Evaluation Complexity 3 or more personal factors and/or comorbidities (P)   -NM     Examination of Body Systems (PT Eval Complexity) total of 4 or more elements (P)   -NM     Clinical Presentation (PT Evaluation Complexity) stable (P)   -NM     Clinical Decision Making (PT Evaluation Complexity) low complexity (P)   -NM     Overall Complexity (PT Evaluation Complexity) low complexity (P)   -NM       Row Name 02/21/24 1014          Therapy Plan Review/Discharge Plan (PT)    Therapy Plan Review (PT) evaluation/treatment results reviewed;care plan/treatment goals reviewed (P)   -NM               User Key  (r) = Recorded By, (t) = Taken By, (c) = Cosigned By      Initials Name Provider Type    NM Jeff Paz, PT Student PT Student                    Physical Therapy Education       Title: PT OT SLP Therapies (Done)       Topic: Physical Therapy (Done)       Point: Mobility training (Done)       Learning Progress Summary             Patient Acceptance, E,TB, VU by NM at 2/21/2024 1019                                         User Key       Initials Effective Dates Name Provider Type Discipline    NM 01/31/24 -  Jeff Paz, PT Student PT Student PT                  PT  Recommendation and Plan  Anticipated Discharge Disposition (PT): (P) home  Therapy Frequency (PT): (P) evaluation only  Plan of Care Reviewed With: (P) patient  Progress: (P) improving  Outcome Evaluation: (P) Pt presents to therapy today with some endurance deficits, but ability to ambulate and transfer safely. Pt is safe to d/c home.   Outcome Measures       Row Name 02/21/24 1000             How much help from another person do you currently need...    Turning from your back to your side while in flat bed without using bedrails? 4 (P)   -NM      Moving from lying on back to sitting on the side of a flat bed without bedrails? 4 (P)   -NM      Moving to and from a bed to a chair (including a wheelchair)? 4 (P)   -NM      Standing up from a chair using your arms (e.g., wheelchair, bedside chair)? 4 (P)   -NM      Climbing 3-5 steps with a railing? 4 (P)   -NM      To walk in hospital room? 4 (P)   -NM      AM-PAC 6 Clicks Score (PT) 24 (P)   -NM      Highest Level of Mobility Goal 8 --> Walked 250 feet or more (P)   -NM                User Key  (r) = Recorded By, (t) = Taken By, (c) = Cosigned By      Initials Name Provider Type    Jeff Momin, PT Student PT Student                     Time Calculation:    PT Charges       Row Name 02/21/24 1013             Time Calculation    PT Received On 02/21/24 (P)   -NM      PT Goal Re-Cert Due Date 03/01/24 (P)   -NM         Untimed Charges    PT Eval/Re-eval Minutes 25 (P)   -NM         Total Minutes    Untimed Charges Total Minutes 25 (P)   -NM       Total Minutes 25 (P)   -NM                User Key  (r) = Recorded By, (t) = Taken By, (c) = Cosigned By      Initials Name Provider Type    Jeff Momin, PT Student PT Student                  Therapy Charges for Today       Code Description Service Date Service Provider Modifiers Qty    60244324407 HC PT EVAL LOW COMPLEXITY 2 2/21/2024 Jeff Paz, PT Student GP 1            PT G-Codes  Outcome  Measure Options: AM-PAC 6 Clicks Daily Activity (OT), Optimal Instrument  AM-PAC 6 Clicks Score (PT): (P) 24  AM-PAC 6 Clicks Score (OT): 22    Jeff Paz, PT Student  2/21/2024

## 2024-02-21 NOTE — PROGRESS NOTES
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 coli bacteremia.  He was treated with Zosyn and vancomycin.  Patient has acute watery diarrhea and C. difficile PCR was positive whereas C. difficile toxin was negative with a comment about possible colonization.  Patient is on probiotics and oral vancomycin for presumed C. difficile and a GI consult requested for further evaluation and management.     For last 3 days he is noted to have acute watery diarrhea. Yesterday he had 12 BM, a day before yesterday, 20 BM.  He was restarted on Rifaximin and diarrhea improved overnight but since morning he has 09 bowel movements.  Stool for fecal lactoferrin are positive.  CRP 23.70, ESR 67.  WBC count back to normal.  Hemoglobin is stable between 10 to 9 g/dL.  LFTs within normal range total protein and albumin is on the lower side with value of 5.5 and 2.5 g/dL.  IgA level normal, tissue transglutaminase in process.    Patient during conversation is falling sleep and appeared to be confused.  The nursing staff also noticed that he is confused and falling sleep again and again with jerky movements    Current Facility-Administered Medications:     !Patient Home Medications Stored on Unit, , Does not apply, BID, Garland Veronica MD    acetaminophen (TYLENOL) tablet 650 mg, 650 mg, Oral, Q4H PRN, 650 mg at 02/20/24 1803 **OR** acetaminophen (TYLENOL) 160 MG/5ML oral solution 650 mg, 650 mg, Oral, Q4H PRN **OR** acetaminophen (TYLENOL)  suppository 650 mg, 650 mg, Rectal, Q4H PRN, Garland Veronica MD    cefTRIAXone (ROCEPHIN) 1000 mg/100 mL 0.9% NS (MBP), 1,000 mg, Intravenous, Q12H, Garland Veronica MD, 1,000 mg at 02/20/24 1321    cholestyramine (QUESTRAN) packet 1 packet, 1 packet, Topical, TID, Saúl Vargas MD, 1 packet at 02/20/24 1749    cholestyramine (QUESTRAN) packet 1 packet, 1 packet, Oral, Q12H, Gonzalez Marroquin MD    dianeal lo-peter 1.5% (DIANEAL) peritoneal dialysate 1,500 mL, 1,500 mL, Intraperitoneal, 4 Exchanges Daily - Dwell Overnight, Garland Veronica MD, 1,500 mL at 02/20/24 1759    diphenhydrAMINE (BENADRYL) capsule 25 mg, 25 mg, Oral, Nightly PRN, Gabriela Workman PA-C, 25 mg at 02/19/24 2314    heparin (porcine) 5000 UNIT/ML injection 5,000 Units, 5,000 Units, Subcutaneous, Q12H, Garland Veronica MD, 5,000 Units at 02/20/24 1003    levothyroxine (SYNTHROID, LEVOTHROID) tablet 125 mcg, 125 mcg, Oral, Q AM, Saúl Vargas MD, 125 mcg at 02/20/24 0637    Lidocaine (Anorectal) (LMX 5) 5 % cream cream 1 Application, 1 Application, Topical, TID PRN, Saúl Vargas MD    melatonin tablet 5 mg, 5 mg, Oral, Nightly PRN, Garland Veronica MD    [START ON 2/21/2024] midodrine (PROAMATINE) tablet 2.5 mg, 2.5 mg, Oral, TID AC, Saúl Vargas MD    mineral oil-hydrophilic petrolatum (AQUAPHOR) ointment 1 Application, 1 Application, Topical, TID, Saúl Vargas MD, 1 Application at 02/20/24 1749    nystatin (MYCOSTATIN) 100,000 unit/mL suspension 500,000 Units, 5 mL, Swish & Swallow, 4x Daily, Coreen Castro MD, 500,000 Units at 02/20/24 1745    ondansetron (ZOFRAN) injection 4 mg, 4 mg, Intravenous, Q6H PRN, Garland Veronica MD    oxyCODONE (ROXICODONE) immediate release tablet 10 mg, 10 mg, Oral, Q8H PRN, Saúl Vargas MD    oxyCODONE (ROXICODONE) immediate release tablet 5 mg, 5 mg, Oral, Q8H PRN, Saúl Vargas MD    potassium chloride  (MICRO-K/KLOR-CON) CR capsule, 20 mEq, Oral, Daily, Rolf Cha MD, 20 mEq at 02/20/24 1003    riFAXIMin (XIFAXAN) tablet 550 mg, 550 mg, Oral, Q8H, Gonzalez Marroquin MD, 550 mg at 02/20/24 1522    sertraline (ZOLOFT) tablet 100 mg, 100 mg, Oral, Nightly, Gabriela Workman PA-C, 100 mg at 02/19/24 2112    sod bicarb-citric acid-simethicone (GAS-X II) 2.21-1.53-0.04 g packet 1 packet, 1 packet, Oral, Once in imaging, Saúl Vargas MD    sodium bicarbonate tablet 1,300 mg, 1,300 mg, Oral, BID, Garland Veronica MD, 1,300 mg at 02/20/24 1003    sodium chloride 0.9 % flush 10 mL, 10 mL, Intravenous, Q12H, Garland Veronica MD, 10 mL at 02/20/24 1003    sodium chloride 0.9 % flush 10 mL, 10 mL, Intravenous, PRN, Garland Veronica MD    sodium chloride 0.9 % infusion 40 mL, 40 mL, Intravenous, PRN, Garland Veronica MD    vancomycin (VANCOCIN) capsule 125 mg, 125 mg, Oral, Q6H, Saúl Vargas MD, 125 mg at 02/20/24 1748    Objective     Vital Signs  Temp:  [98.1 °F (36.7 °C)-98.9 °F (37.2 °C)] 98.5 °F (36.9 °C)  Heart Rate:  [] 77  Resp:  [18-24] 22  BP: (143-182)/(47-92) 151/59  Body mass index is 26.84 kg/m².    Intake/Output Summary (Last 24 hours) at 2/20/2024 1947  Last data filed at 2/20/2024 1800  Gross per 24 hour   Intake 6480 ml   Output 5148.6 ml   Net 1331.4 ml     No intake/output data recorded.     Physical Exam:  General     Alert and oriented x 2, normal affect   Head:    Normocephalic, without obvious abnormality, atraumatic   Eyes:          conjunctivae and sclerae normal, no icterus, pupils round and reactive to light   Oral cavity: Moist and intact, normal dentation   Neck:   Supple, no adenopathy   Chest Wall/Lungs:    No abnormalities observed, equal on expansion bilaterally, No use of extrarespiratory muscles noted   Abdomen:     Soft, nondistended, nontender; normal bowel sounds, no hepatospleenomegaly, no ascites. LUQ with peritoneal dialysis  "catheter and currently receiving dialysis   Extremities:   no edema, clubbing, or cyanosis   Neurologic:   Patient appeared to be confused         Results Review:      Results from last 7 days   Lab Units 02/20/24  0518 02/19/24  0501 02/18/24  0528   WBC 10*3/mm3 9.05 13.99* 11.50*   HEMOGLOBIN g/dL 9.2* 9.9* 9.7*   HEMATOCRIT % 29.1* 30.7* 29.9*   PLATELETS 10*3/mm3 90* 82* 75*     Results from last 7 days   Lab Units 02/20/24  0518 02/19/24  0433 02/18/24  0528   SODIUM mmol/L 137 136 135*   POTASSIUM mmol/L 3.1* 3.3* 3.2*   CHLORIDE mmol/L 101 99 97*   CO2 mmol/L 22.5 21.5* 22.6   BUN mg/dL 36* 42* 43*   CREATININE mg/dL 5.24* 5.54* 5.85*   CALCIUM mg/dL 8.8 8.9 8.6   BILIRUBIN mg/dL 0.3 0.4 0.5   ALK PHOS U/L 59 67 50   ALT (SGPT) U/L 14 15 13   AST (SGOT) U/L 31 30 30   GLUCOSE mg/dL 124* 115* 78     Invalid input(s): \"3\"   No results found for: \"LIPASE\"    Radiology:  FL Esophagram w Modified Barium Swallow Single Contrast    Result Date: 2/19/2024  1. Presbyesophagus 2. Prominent cricopharyngeal bar 3. Laryngeal penetration and silent tracheal aspiration with thin liquid barium     HUYEN SANDHU       Electronically Signed and Approved By: Angel Luis Barrientos M.D. on 2/19/2024 at 17:24               Impression:      Impression:      Acute diarrhea  Fecal lactoferrin positive  Elevated CRP and ESR-multifactorial  Metabolic encephalopathy-  Abnormal barium esophagogram-silent tracheal aspiration  Abdominal bloating  Thrombocytopenia  Normocytic anemia  Recent E. 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  C. difficile PCR was positive with negative toxin.  Fecal lactoferrin positive, elevated CRP and ESR.  Folate and B12 level are within normal level.  " Patient did not have GI panel PCR.  I called the lab and mentioned to them that in December 2023 he was visiting [place] and we should have GI panel to cover noninvasive enteric bacteria, viruses and parasites. Patient is chronically immunocompromised (on Skyrizi).  In presence of positive lactoferrin and elevated CRP and ESR, would like to perform EGD and colonoscopy with biopsies.  Will need clearance from pulmonary in presence of right lower lobe pneumonia and aspiration that it is safe to proceed with an endoscopy intervention or not.  Will add colestyramine with Xifaxan.  Will check CMP, CRP, magnesium, phosphorous on daily basis and will replace accordingly.      I discussed the patients findings and my recommendations with patient, family, and nursing staff.      Electronically signed by Gonzalez Marroquin MD, 02/20/24, 7:47 PM EST.

## 2024-02-22 ENCOUNTER — APPOINTMENT (OUTPATIENT)
Dept: CT IMAGING | Facility: HOSPITAL | Age: 79
DRG: 871 | End: 2024-02-22
Payer: MEDICARE

## 2024-02-22 LAB
ALBUMIN SERPL-MCNC: 2.4 G/DL (ref 3.5–5.2)
ALP SERPL-CCNC: 62 U/L (ref 39–117)
ALT SERPL W P-5'-P-CCNC: 15 U/L (ref 1–41)
ANION GAP SERPL CALCULATED.3IONS-SCNC: 12.9 MMOL/L (ref 5–15)
AST SERPL-CCNC: 31 U/L (ref 1–40)
BASOPHILS # BLD AUTO: 0.06 10*3/MM3 (ref 0–0.2)
BASOPHILS NFR BLD AUTO: 0.8 % (ref 0–1.5)
BILIRUB CONJ SERPL-MCNC: 0.2 MG/DL (ref 0–0.3)
BILIRUB INDIRECT SERPL-MCNC: 0.1 MG/DL
BILIRUB SERPL-MCNC: 0.3 MG/DL (ref 0–1.2)
BUN SERPL-MCNC: 31 MG/DL (ref 8–23)
BUN/CREAT SERPL: 6.3 (ref 7–25)
CALCIUM SPEC-SCNC: 8.8 MG/DL (ref 8.6–10.5)
CHLORIDE SERPL-SCNC: 101 MMOL/L (ref 98–107)
CO2 SERPL-SCNC: 23.1 MMOL/L (ref 22–29)
CREAT SERPL-MCNC: 4.96 MG/DL (ref 0.76–1.27)
DEPRECATED RDW RBC AUTO: 55.3 FL (ref 37–54)
EGFRCR SERPLBLD CKD-EPI 2021: 11.3 ML/MIN/1.73
EOSINOPHIL # BLD AUTO: 0.34 10*3/MM3 (ref 0–0.4)
EOSINOPHIL NFR BLD AUTO: 4.3 % (ref 0.3–6.2)
ERYTHROCYTE [DISTWIDTH] IN BLOOD BY AUTOMATED COUNT: 15.9 % (ref 12.3–15.4)
GLUCOSE SERPL-MCNC: 133 MG/DL (ref 65–99)
HCT VFR BLD AUTO: 28.5 % (ref 37.5–51)
HGB BLD-MCNC: 8.9 G/DL (ref 13–17.7)
IMM GRANULOCYTES # BLD AUTO: 0.65 10*3/MM3 (ref 0–0.05)
IMM GRANULOCYTES NFR BLD AUTO: 8.3 % (ref 0–0.5)
LYMPHOCYTES # BLD AUTO: 1.68 10*3/MM3 (ref 0.7–3.1)
LYMPHOCYTES NFR BLD AUTO: 21.5 % (ref 19.6–45.3)
MAGNESIUM SERPL-MCNC: 2.1 MG/DL (ref 1.6–2.4)
MCH RBC QN AUTO: 30.3 PG (ref 26.6–33)
MCHC RBC AUTO-ENTMCNC: 31.2 G/DL (ref 31.5–35.7)
MCV RBC AUTO: 96.9 FL (ref 79–97)
MONOCYTES # BLD AUTO: 1.16 10*3/MM3 (ref 0.1–0.9)
MONOCYTES NFR BLD AUTO: 14.8 % (ref 5–12)
NEUTROPHILS NFR BLD AUTO: 3.93 10*3/MM3 (ref 1.7–7)
NEUTROPHILS NFR BLD AUTO: 50.3 % (ref 42.7–76)
NRBC BLD AUTO-RTO: 0.4 /100 WBC (ref 0–0.2)
PHOSPHATE SERPL-MCNC: 4 MG/DL (ref 2.5–4.5)
PLATELET # BLD AUTO: 123 10*3/MM3 (ref 140–450)
PMV BLD AUTO: 11.4 FL (ref 6–12)
POTASSIUM SERPL-SCNC: 3.6 MMOL/L (ref 3.5–5.2)
PROT SERPL-MCNC: 5.5 G/DL (ref 6–8.5)
RBC # BLD AUTO: 2.94 10*6/MM3 (ref 4.14–5.8)
SODIUM SERPL-SCNC: 137 MMOL/L (ref 136–145)
TSH SERPL DL<=0.05 MIU/L-ACNC: 6.56 UIU/ML (ref 0.27–4.2)
WBC NRBC COR # BLD AUTO: 7.82 10*3/MM3 (ref 3.4–10.8)

## 2024-02-22 PROCEDURE — 25010000002 OCTREOTIDE PER 25 MCG: Performed by: INTERNAL MEDICINE

## 2024-02-22 PROCEDURE — 85025 COMPLETE CBC W/AUTO DIFF WBC: CPT | Performed by: FAMILY MEDICINE

## 2024-02-22 PROCEDURE — 80048 BASIC METABOLIC PNL TOTAL CA: CPT | Performed by: FAMILY MEDICINE

## 2024-02-22 PROCEDURE — 83735 ASSAY OF MAGNESIUM: CPT | Performed by: FAMILY MEDICINE

## 2024-02-22 PROCEDURE — 25010000002 CEFTRIAXONE PER 250 MG: Performed by: FAMILY MEDICINE

## 2024-02-22 PROCEDURE — 80076 HEPATIC FUNCTION PANEL: CPT | Performed by: FAMILY MEDICINE

## 2024-02-22 PROCEDURE — 84100 ASSAY OF PHOSPHORUS: CPT | Performed by: FAMILY MEDICINE

## 2024-02-22 PROCEDURE — 99232 SBSQ HOSP IP/OBS MODERATE 35: CPT | Performed by: INTERNAL MEDICINE

## 2024-02-22 PROCEDURE — 25010000002 HEPARIN (PORCINE) PER 1000 UNITS: Performed by: INTERNAL MEDICINE

## 2024-02-22 PROCEDURE — 99232 SBSQ HOSP IP/OBS MODERATE 35: CPT | Performed by: FAMILY MEDICINE

## 2024-02-22 PROCEDURE — 84443 ASSAY THYROID STIM HORMONE: CPT | Performed by: FAMILY MEDICINE

## 2024-02-22 PROCEDURE — 25510000001 IOPAMIDOL PER 1 ML: Performed by: FAMILY MEDICINE

## 2024-02-22 PROCEDURE — 0 IOHEXOL 12 MG/ML SOLUTION: Performed by: FAMILY MEDICINE

## 2024-02-22 PROCEDURE — 74177 CT ABD & PELVIS W/CONTRAST: CPT

## 2024-02-22 RX ORDER — CEFTRIAXONE 1 G/1
2 INJECTION, POWDER, FOR SOLUTION INTRAMUSCULAR; INTRAVENOUS EVERY 24 HOURS
Start: 2024-02-22 | End: 2024-02-22 | Stop reason: SDUPTHER

## 2024-02-22 RX ORDER — OCTREOTIDE ACETATE 50 UG/ML
200 INJECTION, SOLUTION INTRAVENOUS; SUBCUTANEOUS EVERY 8 HOURS SCHEDULED
Status: DISCONTINUED | OUTPATIENT
Start: 2024-02-23 | End: 2024-02-24

## 2024-02-22 RX ORDER — OCTREOTIDE ACETATE 50 UG/ML
200 INJECTION, SOLUTION INTRAVENOUS; SUBCUTANEOUS EVERY 8 HOURS SCHEDULED
Qty: 36 ML | Refills: 0 | Status: DISCONTINUED | OUTPATIENT
Start: 2024-02-23 | End: 2024-02-22

## 2024-02-22 RX ADMIN — RIFAXIMIN 550 MG: 550 TABLET ORAL at 13:54

## 2024-02-22 RX ADMIN — POTASSIUM CHLORIDE 20 MEQ: 10 CAPSULE, COATED, EXTENDED RELEASE ORAL at 09:38

## 2024-02-22 RX ADMIN — RIFAXIMIN 550 MG: 550 TABLET ORAL at 23:01

## 2024-02-22 RX ADMIN — ACETAMINOPHEN 650 MG: 325 TABLET ORAL at 13:54

## 2024-02-22 RX ADMIN — NYSTATIN 500000 UNITS: 100000 SUSPENSION ORAL at 14:29

## 2024-02-22 RX ADMIN — VANCOMYCIN HYDROCHLORIDE 125 MG: 125 CAPSULE ORAL at 23:04

## 2024-02-22 RX ADMIN — SODIUM BICARBONATE 650 MG TABLET 1300 MG: at 23:01

## 2024-02-22 RX ADMIN — VANCOMYCIN HYDROCHLORIDE 125 MG: 125 CAPSULE ORAL at 13:55

## 2024-02-22 RX ADMIN — VANCOMYCIN HYDROCHLORIDE 125 MG: 125 CAPSULE ORAL at 06:13

## 2024-02-22 RX ADMIN — Medication 10 ML: at 09:42

## 2024-02-22 RX ADMIN — Medication 10 ML: at 09:41

## 2024-02-22 RX ADMIN — OCTREOTIDE ACETATE 200 MCG: 100 INJECTION, SOLUTION INTRAVENOUS; SUBCUTANEOUS at 06:13

## 2024-02-22 RX ADMIN — HEPARIN SODIUM 5000 UNITS: 5000 INJECTION INTRAVENOUS; SUBCUTANEOUS at 23:05

## 2024-02-22 RX ADMIN — RIFAXIMIN 550 MG: 550 TABLET ORAL at 06:13

## 2024-02-22 RX ADMIN — SODIUM CHLORIDE, SODIUM LACTATE, CALCIUM CHLORIDE, MAGNESIUM CHLORIDE AND DEXTROSE 1500 ML: 1.5; 538; 448; 18.3; 5.08 INJECTION, SOLUTION INTRAPERITONEAL at 09:52

## 2024-02-22 RX ADMIN — NYSTATIN 500000 UNITS: 100000 SUSPENSION ORAL at 23:02

## 2024-02-22 RX ADMIN — SODIUM BICARBONATE 650 MG TABLET 1300 MG: at 09:51

## 2024-02-22 RX ADMIN — SODIUM CHLORIDE, SODIUM LACTATE, CALCIUM CHLORIDE, MAGNESIUM CHLORIDE AND DEXTROSE 1500 ML: 1.5; 538; 448; 18.3; 5.08 INJECTION, SOLUTION INTRAPERITONEAL at 13:33

## 2024-02-22 RX ADMIN — HEPARIN SODIUM 5000 UNITS: 5000 INJECTION INTRAVENOUS; SUBCUTANEOUS at 09:39

## 2024-02-22 RX ADMIN — CEFTRIAXONE SODIUM 2000 MG: 2 INJECTION, POWDER, FOR SOLUTION INTRAMUSCULAR; INTRAVENOUS at 13:56

## 2024-02-22 RX ADMIN — SODIUM CHLORIDE, SODIUM LACTATE, CALCIUM CHLORIDE, MAGNESIUM CHLORIDE AND DEXTROSE 1500 ML: 1.5; 538; 448; 18.3; 5.08 INJECTION, SOLUTION INTRAPERITONEAL at 23:00

## 2024-02-22 RX ADMIN — SERTRALINE HYDROCHLORIDE 100 MG: 100 TABLET ORAL at 23:00

## 2024-02-22 RX ADMIN — IOPAMIDOL 100 ML: 755 INJECTION, SOLUTION INTRAVENOUS at 22:24

## 2024-02-22 RX ADMIN — OCTREOTIDE ACETATE 200 MCG: 100 INJECTION, SOLUTION INTRAVENOUS; SUBCUTANEOUS at 13:55

## 2024-02-22 RX ADMIN — NYSTATIN 500000 UNITS: 100000 SUSPENSION ORAL at 09:51

## 2024-02-22 RX ADMIN — LEVOTHYROXINE SODIUM 125 MCG: 125 TABLET ORAL at 06:13

## 2024-02-22 RX ADMIN — SODIUM CHLORIDE, SODIUM LACTATE, CALCIUM CHLORIDE, MAGNESIUM CHLORIDE AND DEXTROSE 1500 ML: 1.5; 538; 448; 18.3; 5.08 INJECTION, SOLUTION INTRAPERITONEAL at 17:58

## 2024-02-22 RX ADMIN — IOHEXOL 500 ML: 12 SOLUTION ORAL at 17:58

## 2024-02-22 NOTE — PLAN OF CARE
Goal Outcome Evaluation:     AOX4 with forgetfulness. Fever x1 noted, medicated with tylenol per order. No c/o pain. Wound and skin care provided per orders. PD completed per orders. Up with standby assistance, unsteady. Continues with nectar thickened liquids. CT of abdomen ordered. Unable to obtain stool sample, no BM noted this shift. Fall risk measures in place.

## 2024-02-22 NOTE — PROGRESS NOTES
[facility]     Nephrology Progress Note      Patient Name: Nelson Wang  : 1945  MRN: [id number]  Primary Care Physician:  Janna Mo MD  Date of admission: 2024    Subjective   Subjective     Interval History:  Patient Reports feeling somewhat better.  No trouble with PD.  Diarrhea improved.  Tolerating some p.o.    Review of Systems   All systems were reviewed and negative except for: What is mentioned above.    Objective   Objective     Vitals:   Temp:  [98.2 °F (36.8 °C)-99.8 °F (37.7 °C)] 99.3 °F (37.4 °C)  Heart Rate:  [73-80] 78  Resp:  [18-20] 20  BP: (108-140)/() 118/70  Physical Exam:   Constitutional: Awake, alert, no distress, conversant and pleasant.   Eyes: sclerae anicteric, no conjunctival injection, some pallor noted.   HENT: mucous membranes moist   Neck: Supple, no thyromegaly, no lymphadenopathy, trachea midline, No JVD   Respiratory: Decreased to auscultation bilaterally, nonlabored respirations    Cardiovascular: RRR, no murmurs, rubs, or gallops.   Gastrointestinal: Positive bowel sounds, soft, nontender, nondistended, site of PD catheter without drainage, erythema or tenderness.   Musculoskeletal: No edema, no clubbing or cyanosis   Psychiatric: Appropriate affect, cooperative   Neurologic: Oriented x 3, moving all extremities, Cranial Nerves grossly intact, speech clear   Skin: warm and dry, no rashes, + vitiligo    Result Review    Result Reviewed:  I have personally reviewed the results from the time of this admission to 2024 11:10 EST and agree with these findings:  [x]  Laboratory  []  Microbiology  [x]  Radiology  []  EKG/Telemetry   []  Cardiology/Vascular   []  Pathology  []  Old records  []  Other:        Lab 24  0630 24  0451 24  1931 24  0518 24  0433 24  0528 24  0230 24  0746   SODIUM 137 137 136 137 136 135* 136 135*   POTASSIUM 3.6 3.3* 3.0* 3.1* 3.3* 3.2* 3.7 3.1*   CHLORIDE 101 101 100 101  99 97* 99 95*   CO2 23.1 22.0 22.9 22.5 21.5* 22.6 19.6* 24.2   BUN 31* 32* 34* 36* 42* 43* 44* 38*   CREATININE 4.96* 4.87* 5.02* 5.24* 5.54* 5.85* 6.62* 7.22*   GLUCOSE 133* 129* 150* 124* 115* 78 162* 96   EGFR 11.3* 11.5* 11.1* 10.6* 9.9* 9.2* 8.0* 7.2*   ANION GAP 12.9 14.0 13.1 13.5 15.5* 15.4* 17.4* 15.8*   MAGNESIUM 2.1 1.9 2.2 2.3 2.6* 3.0* 1.1*  --    PHOSPHORUS 4.0 2.8 3.0 3.2 3.4 3.7 5.3*  --              Assessment & Plan   Assessment / Plan       Active Hospital Problems:  Active Hospital Problems    Diagnosis     **Peritonitis        Assessment and Plan:    - ESRD, on peritoneal dialysis.  Volume status is adequate.  Electrolytes are as noted above.  Continue 1.5 L, 1.5% dextrose solution 4 times a day.  Monitor closely.  continue nystatin swish and swallow while on antibiotics for fungal peritonitis prevention.  - Severe, watery diarrhea, being evaluated by GI.  May be better.  - E. coli sepsis, source is unclear to me, possibly related to GI translocation or pneumonia.  On therapy.  No evidence of peritonitis.  - Hypokalemia, being replaced.  Liberalize diet.  Add protein supplements/shakes if able to tolerate.  Magnesium okay.    - Anemia of chronic kidney disease, was on long-acting RONALD as outpatient.  Last dose of Mircera 12/5/2023.  Hemoglobin is stable. Will check iron studies if hemoglobin is worse.  - Thrombocytopenia, slightly better, no bleeding, monitor.  - Hypothyroidism, on therapy, per primary.  - Metabolic acidosis, on p.o. bicarb.  Monitor.  - Right lower lobe pneumonia, per primary.

## 2024-02-22 NOTE — PLAN OF CARE
Goal Outcome Evaluation:     Transfer this shift. AOX4 with intermittent confusion/forgetfulness. PD completed per orders. Stool specimen collected and sent to lab. WOC RN evaluated patient this shift, wound and skin care provided per orders. Up with standby assist, unsteady gait. Incontinent episodes, continues with watery stool. Fall risk measures in place. Encouraged use of incentive spirometer, congested cough. Midline placed this shift per order.

## 2024-02-22 NOTE — PROGRESS NOTES
Pulmonary / Critical Care Progress Note      Patient Name: Nelson Wang  : 1945  MRN: [id number]  Attending:  Saúl Vargas MD   Date of admission: 2024    Subjective   Subjective   Follow-up for septic shock, right lower lobe pneumonia    On room air  Denies respiratory complaints  Still with issues with diarrhea.  GI following  Swallow evaluation consistent with aspiration  On PD fluid exchanges  On antibiotics for E. coli bacteremia  No fevers noted    Objective   Objective     Vitals:   Vital signs for last 24 hours:  Temp:  [98.2 °F (36.8 °C)-99.8 °F (37.7 °C)] 98.8 °F (37.1 °C)  Heart Rate:  [73-80] 76  Resp:  [18-20] 20  BP: (108-140)/() 108/60    Intake/Output last 3 shifts:  I/O last 3 completed shifts:  In: 6340 [P.O.:340]  Out: 5371.5   Intake/Output this shift:  No intake/output data recorded.    Physical Exam   Vital Signs Reviewed   WDWN, Alert, NAD.  On room air  HEENT:  PERRL, EOMI.  OP, nares clear  Chest:  good aeration, decreasing right base rhonchi, tympanic to percussion bilaterally, no work of breathing noted  CV: RRR, no MGR, pulses 2+, equal.  Abd:  Soft, NT, ND, + BS, no HSM  EXT:  no clubbing, no cyanosis, no edema  Neuro:  A&Ox3, CN grossly intact, no focal deficits.  Skin: No rashes or lesions noted, PD cath in place    Result Review    Result Review:  I have personally reviewed the results from the time of this admission to 2024 07:15 EST and agree with these findings:  [x]  Laboratory  [x]  Microbiology  [x]  Radiology  []  EKG/Telemetry   [x]  Cardiology/Vascular   []  Pathology  []  Old records  []  Other:  Most notable findings include:   Blood culture / gram-negative bacilli, BC ID E. coli  C. difficile positive                      Lab 24  0630 24  0451 24  1931 24  0518 24  0501 24  0433 24  0528 24  0230 24  0746   WBC 7.82 8.53  --  9.05 13.99*  --  11.50* 9.71 5.34   HEMOGLOBIN 8.9* 9.6*  --   9.2* 9.9*  --  9.7* 10.0* 10.9*   HEMATOCRIT 28.5* 29.7*  --  29.1* 30.7*  --  29.9* 32.1* 34.2*   PLATELETS 123* 97*  --  90* 82*  --  75* 102* 102*   SODIUM 137 137 136 137  --  136 135* 136 135*   POTASSIUM 3.6 3.3* 3.0* 3.1*  --  3.3* 3.2* 3.7 3.1*   CHLORIDE 101 101 100 101  --  99 97* 99 95*   CO2 23.1 22.0 22.9 22.5  --  21.5* 22.6 19.6* 24.2   BUN 31* 32* 34* 36*  --  42* 43* 44* 38*   CREATININE 4.96* 4.87* 5.02* 5.24*  --  5.54* 5.85* 6.62* 7.22*   GLUCOSE 133* 129* 150* 124*  --  115* 78 162* 96   CALCIUM 8.8 8.7 8.6 8.8  --  8.9 8.6 7.7* 8.3*   PHOSPHORUS 4.0 2.8 3.0 3.2  --  3.4 3.7 5.3*  --    TOTAL PROTEIN 5.5* 5.4* 5.8* 5.5*  --  5.9* 5.5*  --  5.7*   ALBUMIN 2.4* 2.5* 2.5* 2.5*  --  2.5* 2.6*  --  3.0*   GLOBULIN  --   --  3.3  --   --  3.4 2.9  --  2.7     CT Chest Without Contrast Diagnostic    Result Date: 2/18/2024  PROCEDURE: CT CHEST WO CONTRAST DIAGNOSTIC  COMPARISON: [facility], CT, CT CHEST WO CONTRAST DIAGNOSTIC, 12/06/2023, 11:11.  [facility], CT, CT ABDOMEN PELVIS WO CONTRAST, 2/16/2024, 14:16.  INDICATIONS: SHORTNESS OF BREATH  PROTOCOL:   Standard imaging protocol performed    RADIATION:   DLP: 313 mGy*cm   Automated exposure control was utilized to minimize radiation dose.  TECHNIQUE: Axial images of the chest without intravenous contrast.  FINDINGS: Slight improvement in right lower lobe airspace consolidation.  Areas of ground-glass opacity in the right mid and lower lung field appear stable.  No cavitary lesions are identified.  Stable pneumoperitoneum and abdominal free fluid likely related to peritoneal dialysis.  Coronary artery calcification is present.  The thoracic aorta has a normal caliber.  There is no mediastinal, axillary or hilar adenopathy.  Right IJ central line tip is in the SVC.  There is mild gynecomastia.  No evidence of pneumothorax.  No evidence of significant pleural or pericardial effusion.  Degenerative changes are present  in the thoracic spine.  IMPRESSION:  Slight improvement in the right lower lobe airspace consolidation.  EPIFANIO RENTERIA MD       Electronically Signed and Approved By: EPIFANIO RENTERIA MD on 2/18/2024 at 10:04                Assessment & Plan   Assessment / Plan     Active Hospital Problems:  Active Hospital Problems    Diagnosis     **Peritonitis      Impression:  Septic shock, present on admission  Right lower lobe pneumonia of unspecified organism  E. coli bacteremia  C. difficile colitis  Aspiration pneumonia of right lower lobe.  Suspect related to E. coli  Acute on chronic diarrhea  Clinically significant lactic acidosis  ESRD on peritoneal dialysis  Anemia of CKD  Hypokalemia  Anion gap metabolic acidosis  Psoriasis on chronic immunosuppression    Plan:  -Patient currently on room air  -Continue aspiration precautions.  Swallow study consistent with aspiration  -Diet per speech therapy  -Continue ceftriaxone for E. coli bacteremia  -Continue oral vancomycin for C. difficile colitis  -Diarrhea management per GI.  Appreciate assistance  -Repeat noncontrast chest CT in 3 months as outpatient  -Echocardiogram consistent with diastolic dysfunction  -Continue midodrine 5 mg 3 times daily  -Nephrology following, PD per their service  -Trend renal panel and electrolytes.   -Encourage activity and incentive spirometer use    DVT prophylaxis:  Medical and mechanical DVT prophylaxis orders are present.    CODE STATUS:   Level Of Support Discussed With: Patient  Code Status (Patient has no pulse and is not breathing): CPR (Attempt to Resuscitate)  Medical Interventions (Patient has pulse or is breathing): Full Support      Labs, imaging, microbiology, notes and medications personally reviewed  Discussed with primary    Electronically signed by Scottie Valentin MD, 02/22/24, 9:25 AM EST.

## 2024-02-22 NOTE — PROGRESS NOTES
[facility]   Hospitalist Progress Note  Date: 2024  Patient Name: Nelson Wang  : 1945  MRN: [id number]  Date of admission: 2024      Subjective   Subjective     Chief complaint: Fever and weakness    Summary:  79-year-old male with history of ESRD on peritoneal dialysis, hypertension, diabetes mellitus, dyslipidemia, hypothyroidism, hyperuricemia/gout, vitiligo, immunosuppressed on Skyrizi, hospitalized on 2024 with chief complaint of fevers and weakness, found to be in septic shock due to E. coli bacteremia and right lower lobe pneumonia, with C. difficile positive stools, with concern for acute peritonitis, placed on broad-spectrum antibiotics, nephrology and critical care consulted, required Levophed, central line placed, Levophed weaned, transferred out of the ICU after starting midodrine, C. difficile positive stools, vancomycin orally started, GI consulted with persistent ongoing diarrhea.  Speech therapy consulted with concern for aspiration, modified barium swallow indicated laryngeal penetration and silent aspiration with thin liquids, recommended nectar thick solids, no straws.  Midline placed.    Interval follow-up: Seen and examined this morning, was in no acute distress, no acute major overnight events, less diarrhea overnight.  Slept better.  Took an oxycodone for pain.  Appears well this morning.  No nausea or vomiting.  Potassium up to 3.6, creatinine 4.96, BUN 31, white blood cell count 7000, hemoglobin 8.9.  Gastrointestinal panel came back negative, still pending electrolytes.  Octreotide subcu started, seems to have helped slow down diarrhea.  Midline placed, remains weak and fatigued.    Review of systems:  All systems reviewed negative except for less diarrhea, weakness, generalized fatigue, excoriation of perianal skin      Objective   Objective     Vitals:   Temp:  [98.8 °F (37.1 °C)-99.8 °F (37.7 °C)] 98.9 °F (37.2 °C)  Heart Rate:  [75-80] 79  Resp:  [18-20]  18  BP: (108-140)/(58-79) 110/60  Physical Exam    Constitutional: Awake, alert, no acute distress sitting upright at the edge of the bed about to eat breakfast   Eyes: Pupils equal, sclerae anicteric, no conjunctival injection   HENT: NCAT, mucous membranes moist   Neck: Supple, full range of motion  Respiratory: Diminished coarse breath sounds mostly on the right side lower lobe   Cardiovascular: RRR, no murmurs, rubs, or gallops, palpable pedal pulses bilaterally   Gastrointestinal: Positive bowel sounds, soft, nontender, distended, PD catheter in place while going fluid exchange   Musculoskeletal: No bilateral ankle edema, no clubbing or cyanosis to extremities   Psychiatric: Appropriate affect, cooperative   Neurologic: Oriented x 3, strength symmetric in all extremities, Cranial Nerves grossly intact to confrontation, speech clear   Skin: No rashes visible on exposed skin; rectal exam deferred      Result Review    Result Review:  I have personally reviewed the pertinent results from the past 24 hours to 2/22/2024 12:17 EST and agree with these findings:  [x]  Laboratory   CBC          2/20/2024    05:18 2/21/2024    04:51 2/22/2024    06:30   CBC   WBC 9.05  8.53  7.82    RBC 3.08  3.13  2.94    Hemoglobin 9.2  9.6  8.9    Hematocrit 29.1  29.7  28.5    MCV 94.5  94.9  96.9    MCH 29.9  30.7  30.3    MCHC 31.6  32.3  31.2    RDW 15.3  15.8  15.9    Platelets 90  97  123      BMP          2/20/2024    05:18 2/20/2024    19:31 2/21/2024    04:51 2/22/2024    06:30   BMP   BUN 36  34  32  31    Creatinine 5.24  5.02  4.87  4.96    Sodium 137  136  137  137    Potassium 3.1  3.0  3.3  3.6    Chloride 101  100  101  101    CO2 22.5  22.9  22.0  23.1    Calcium 8.8  8.6  8.7  8.8      LIVER FUNCTION TESTS:      Lab 02/22/24  0630 02/21/24  0451 02/20/24  1931 02/20/24  0518 02/19/24  0433 02/18/24  0528 02/16/24  0746   TOTAL PROTEIN 5.5* 5.4* 5.8* 5.5* 5.9* 5.5* 5.7*   ALBUMIN 2.4* 2.5* 2.5* 2.5* 2.5* 2.6* 3.0*    GLOBULIN  --   --  3.3  --  3.4 2.9 2.7   ALT (SGPT) 15 15 16 14 15 13 9   AST (SGOT) 31 36 38 31 30 30 15   BILIRUBIN 0.3 0.4 0.3 0.3 0.4 0.5 0.6   INDIRECT BILIRUBIN 0.1 0.2  --  0.1  --   --   --    BILIRUBIN DIRECT 0.2 0.2  --  0.2  --   --   --    ALK PHOS 62 56 66 59 67 50 60       [x]  Microbiology   Microbiology Results (last 10 days)       Procedure Component Value - Date/Time    Gastrointestinal Panel, PCR - Stool, Per Rectum [[id number]]  (Normal) Collected: 02/21/24 1402    Lab Status: Final result Specimen: Stool from Per Rectum Updated: 02/21/24 1827     Campylobacter Not Detected     Plesiomonas shigelloides Not Detected     Salmonella Not Detected     Vibrio Not Detected     Vibrio cholerae Not Detected     Yersinia enterocolitica Not Detected     Enteroaggregative E. coli (EAEC) Not Detected     Enteropathogenic E. coli (EPEC) Not Detected     Enterotoxigenic E. coli (ETEC) lt/st Not Detected     Shiga-like toxin-producing E. coli (STEC) stx1/stx2 Not Detected     Shigella/Enteroinvasive E. coli (EIEC) Not Detected     Cryptosporidium Not Detected     Cyclospora cayetanensis Not Detected     Entamoeba histolytica Not Detected     Giardia lamblia Not Detected     Adenovirus F40/41 Not Detected     Astrovirus Not Detected     Norovirus GI/GII Not Detected     Rotavirus A Not Detected     Sapovirus (I, II, IV or V) Not Detected    Narrative:      If Aeromonas, Staphylococcus aureus or Bacillus cereus are suspected, please order [id number]: Stool Culture, Aeromonas, S aureus, B Cereus.    Enteric Bacterial Panel - Stool, Per Rectum [[id number]]  (Normal) Collected: 02/20/24 1121    Lab Status: Final result Specimen: Stool from Per Rectum Updated: 02/21/24 0946     Salmonella Not Detected     Campylobacter Not Detected     Shigella/Enteroinvasive E. coli (EIEC) Not Detected     Shiga-like toxin-producing E. coli (STEC) stx1/stx2 Not Detected    Enteric Parasite Panel - Stool, Per Rectum [[id number]]   (Normal) Collected: 02/20/24 1121    Lab Status: Final result Specimen: Stool from Per Rectum Updated: 02/21/24 0951     Giardia lamblia Not Detected     Cryptosporidium Not Detected     Entamoeba histolytica Not Detected    Blood Culture - Blood, Hand, Right [[id number]]  (Normal) Collected: 02/18/24 1441    Lab Status: Preliminary result Specimen: Blood from Hand, Right Updated: 02/21/24 1515     Blood Culture No growth at 3 days    Blood Culture - Blood, Neck [[id number]]  (Normal) Collected: 02/18/24 1440    Lab Status: Preliminary result Specimen: Blood from Neck Updated: 02/21/24 1515     Blood Culture No growth at 3 days    Clostridioides difficile Toxin - Stool, Per Rectum [[id number]]  (Abnormal) Collected: 02/16/24 2010    Lab Status: Final result Specimen: Stool from Per Rectum Updated: 02/16/24 2138    Narrative:      The following orders were created for panel order Clostridioides difficile Toxin - Stool, Per Rectum.  Procedure                               Abnormality         Status                     ---------                               -----------         ------                     Clostridioides difficile...[[id number]]  Abnormal            Final result                 Please view results for these tests on the individual orders.    Clostridioides difficile Toxin, PCR - Stool, Per Rectum [[id number]]  (Abnormal) Collected: 02/16/24 2010    Lab Status: Final result Specimen: Stool from Per Rectum Updated: 02/16/24 2138     Toxigenic C. difficile by PCR Positive     027 Toxin Presumptive Negative    Narrative:      DNA from a toxigenic strain of C.difficile has been detected. Antigen testing for the presence of free C.difficile toxin is currently in progress, to help determine the clinical significance of this PCR result.     Enteric Bacterial Panel - Stool, Per Rectum [[id number]]  (Normal) Collected: 02/16/24 2010    Lab Status: Final result Specimen: Stool from Per Rectum Updated: 02/17/24 1248      Salmonella Not Detected     Campylobacter Not Detected     Shigella/Enteroinvasive E. coli (EIEC) Not Detected     Shiga-like toxin-producing E. coli (STEC) stx1/stx2 Not Detected    Clostridioides difficile toxin Ag, Reflex - Stool, Per Rectum [[id number]]  (Normal) Collected: 02/16/24 2010    Lab Status: Final result Specimen: Stool from Per Rectum Updated: 02/16/24 2139     C.diff Toxin Ag Negative    Narrative:      DNA from a toxigenic strain of C.difficile was detected, although the free toxin itself was not detected. These findings are consistent with C.difficile colonization and may not reflect actual C.difficile infection. Clinical correlation needed.    MRSA Screen, PCR (Inpatient) - Swab, Nares [[id number]]  (Normal) Collected: 02/16/24 1447    Lab Status: Final result Specimen: Swab from Nares Updated: 02/16/24 1635     MRSA PCR No MRSA Detected    Narrative:      The negative predictive value of this diagnostic test is high and should only be used to consider de-escalating anti-MRSA therapy. A positive result may indicate colonization with MRSA and must be correlated clinically.    Respiratory Panel PCR w/COVID-19(SARS-CoV-2) RA/TANIA/GAVIN/PAD/COR/NENA In-House, NP Swab in UTM/VTM, 2 HR TAT - Swab, Nasopharynx [[id number]]  (Normal) Collected: 02/16/24 1447    Lab Status: Final result Specimen: Swab from Nasopharynx Updated: 02/16/24 1622     ADENOVIRUS, PCR Not Detected     Coronavirus 229E Not Detected     Coronavirus HKU1 Not Detected     Coronavirus NL63 Not Detected     Coronavirus OC43 Not Detected     COVID19 Not Detected     Human Metapneumovirus Not Detected     Human Rhinovirus/Enterovirus Not Detected     Influenza A PCR Not Detected     Influenza B PCR Not Detected     Parainfluenza Virus 1 Not Detected     Parainfluenza Virus 2 Not Detected     Parainfluenza Virus 3 Not Detected     Parainfluenza Virus 4 Not Detected     RSV, PCR Not Detected     Bordetella pertussis pcr Not Detected      Bordetella parapertussis PCR Not Detected     Chlamydophila pneumoniae PCR Not Detected     Mycoplasma pneumo by PCR Not Detected    Narrative:      In the setting of a positive respiratory panel with a viral infection PLUS a negative procalcitonin without other underlying concern for bacterial infection, consider observing off antibiotics or discontinuation of antibiotics and continue supportive care. If the respiratory panel is positive for atypical bacterial infection (Bordetella pertussis, Chlamydophila pneumoniae, or Mycoplasma pneumoniae), consider antibiotic de-escalation to target atypical bacterial infection.    Legionella Antigen, Urine - Urine, Urine, Clean Catch [[id number]]  (Normal) Collected: 02/16/24 1127    Lab Status: Final result Specimen: Urine, Clean Catch Updated: 02/16/24 1322     LEGIONELLA ANTIGEN, URINE Negative    S. Pneumo Ag Urine or CSF - Urine, Urine, Clean Catch [[id number]]  (Normal) Collected: 02/16/24 1127    Lab Status: Final result Specimen: Urine, Clean Catch Updated: 02/16/24 1322     Strep Pneumo Ag Negative    Body Fluid Culture - Body Fluid, Peritoneum [[id number]] Collected: 02/16/24 1030    Lab Status: Final result Specimen: Body Fluid from Peritoneum Updated: 02/19/24 0852     Body Fluid Culture No growth at 3 days     Gram Stain No WBCs seen      No organisms seen    Blood Culture - Blood, Arm, Left [[id number]]  (Abnormal) Collected: 02/16/24 0937    Lab Status: Final result Specimen: Blood from Arm, Left Updated: 02/18/24 0922     Blood Culture Escherichia coli     Isolated from Aerobic and Anaerobic Bottles     Gram Stain Anaerobic Bottle Gram negative bacilli      Aerobic Bottle Gram negative bacilli    Narrative:      Refer to previous blood culture collected on 2/16 for andreas's      COVID-19, FLU A/B, RSV PCR 1 HR TAT - Swab, Nasopharynx [[id number]]  (Normal) Collected: 02/16/24 0753    Lab Status: Final result Specimen: Swab from Nasopharynx Updated: 02/16/24 0806      COVID19 Not Detected     Influenza A PCR Not Detected     Influenza B PCR Not Detected     RSV, PCR Not Detected    Narrative:      Fact sheet for providers: https://www.fda.gov/media/028389/download    Fact sheet for patients: https://www.fda.gov/media/079656/download    Test performed by PCR.    Blood Culture - Blood, Arm, Right [[id number]]  (Abnormal)  (Susceptibility) Collected: 02/16/24 0746    Lab Status: Final result Specimen: Blood from Arm, Right Updated: 02/18/24 0922     Blood Culture Escherichia coli     Isolated from Aerobic and Anaerobic Bottles     Gram Stain Aerobic Bottle Gram negative bacilli      Anaerobic Bottle Gram negative bacilli    Susceptibility        Escherichia coli      RITU      Amoxicillin + Clavulanate Susceptible      Ampicillin Resistant      Ampicillin + Sulbactam Resistant      Cefepime Susceptible      Ceftazidime Susceptible      Ceftriaxone Susceptible      Gentamicin Susceptible      Levofloxacin Resistant      Piperacillin + Tazobactam Susceptible      Trimethoprim + Sulfamethoxazole Susceptible                       Susceptibility Comments       Escherichia coli    Cefazolin sensitivity will not be reported for Enterobacteriaceae in non-urine isolates. If cefazolin is preferred, please call the microbiology lab to request an E-test.  With the exception of urinary-sourced infections, aminoglycosides should not be used as monotherapy.               Blood Culture ID, PCR - Blood, Arm, Right [[id number]]  (Abnormal) Collected: 02/16/24 0746    Lab Status: Final result Specimen: Blood from Arm, Right Updated: 02/16/24 2146     BCID, PCR Escherichia coli. Identification by BCID2 PCR.     BOTTLE TYPE Aerobic Bottle    Narrative:      No resistance genes detected.              [x]  Radiology FL Esophagram w Modified Barium Swallow Single Contrast    Result Date: 2/19/2024  1. Presbyesophagus 2. Prominent cricopharyngeal bar 3. Laryngeal penetration and silent tracheal aspiration with  thin liquid barium     HUYEN SANDHU       Electronically Signed and Approved By: Angel Luis Barrientos M.D. on 2/19/2024 at 17:24             CT Abdomen Pelvis Without Contrast    Result Date: 2/16/2024    1. Extensive right lower lobe pneumonia. 2. Pneumoperitoneum similar to prior chest CT likely related to peritoneal dialysis catheter.  No organized fluid collection or abscess. 3. Nonobstructing 11 mm calculus at the right renal pelvis unchanged. 4. Additional chronic findings above.     LISY SANTILLAN MD       Electronically Signed and Approved By: LISY SANTILLAN MD on 2/16/2024 at 14:46             XR Chest 1 View    Result Date: 2/16/2024   Right lower lobe airspace opacity consistent with pneumonia.  Pneumoperitoneum, better seen on same day CT of the abdomen/pelvis.       JOSH HAMPTON MD       Electronically Signed and Approved By: JOSH HAMPTON MD on 2/16/2024 at 14:30             XR Chest 1 View    Result Date: 2/16/2024    No acute process.       LISY SANTILLAN MD       Electronically Signed and Approved By: LISY SANTILLAN MD on 2/16/2024 at 8:07                [x]  EKG/Telemetry   No orders to display       [x]  Cardiology/Vascular   []  Pathology  [x]  Old records  []  Other:    Assessment & Plan   Assessment / Plan     Assessment/Plan:    Assessment:  Septic shock due to E. coli bacteremia  E. coli bacteremia  Right lower lobe pneumonia likely due to E. coli likely due to silent aspiration  Psoriasis on chronic immunosuppression  C. difficile colitis and diarrhea  Acute peritonitis  Anion gap metabolic acidosis  Lactic acidosis  ESRD on PD  Hypokalemia  Diabetes mellitus with insulin use  Essential hypertension  Dyslipidemia  Hypothyroidism  Hyperuricemia/gout  Anemia of chronic kidney disease  Thrombocytopenia    Plan:  Labs and imaging reviewed  CT abdomen pelvis with IV contrast to rule out carcinoid syndrome  Prescription written for ceftriaxone, working with  to get it approved  Midline  care  Discussed with wound care, changes to wound care regimen to help with excoriated skin perianally  Continue vancomycin oral 125 mg every 6 hours  GI consulted Dr. Marroquin, recommendations appreciated; continued rifaximin, await stool electrolytes samples, continue octreotide 200 mcg subcu every 8 hours for the next 24 hours after stool samples provided, ultimately needs colonoscopy in the future.  TSH improving, will continue current dose of levothyroxine  Continue levothyroxine 125 mcg daily  Continue ceftriaxone 2 g daily IV will need to complete 2 weeks total  Renal diet, nectar thick liquids  Patient assumes responsibility and risk of aspiration   Zofran for nausea  Melatonin at night to help with sleep  Prophylactic nystatin swish and swallow  Sodium bicarb tabs 1300 mg twice a day continued  Dialysis fluid exchange per nephrology  A.m. labs  Full code  PT/OT  DVT prophylaxis with heparin  Discussed with nurse at the bedside    DVT prophylaxis:  Medical and mechanical DVT prophylaxis orders are present.        CODE STATUS:   Level Of Support Discussed With: Patient  Code Status (Patient has no pulse and is not breathing): CPR (Attempt to Resuscitate)  Medical Interventions (Patient has pulse or is breathing): Full Support    Electronically signed by Saúl Vargas MD, 02/22/24, 1:19 PM EST.  Portions of this documentation were transcribed electronically from a voice recognition software.  I confirm all data accurately represents the service(s) I performed at today's visit.

## 2024-02-22 NOTE — PLAN OF CARE
Goal Outcome Evaluation:      Patient medicated with PRN oxycodone. PD completed as ordered. Patient tolerated well. No needs or issues noted at this time.

## 2024-02-22 NOTE — PROGRESS NOTES
[facility], [place], operated by [facility]  Inpatient Progress Note    Reason for Follow Up:  Acute diarrhea    Interval History: Patient is 79 y.o. pleasant, male, who is retired psychiatrist with known history of ESRD on peritoneal dialysis, hypertension, diabetes mellitus, psoriasis, chronic immunosuppressant, (on Skyrizi), anemia, thrombocytopenia, tubular adenoma of colon, esophagitis, diverticulosis of colon, IBS admitted on 02/16/2024 with septic shock, right lower lobe pneumonia, anion gap metabolic acidosis with elevated lactate level and E. coli bacteremia.  Initially, he was treated with Zosyn and vancomycin.  Patient after admission, started having acute watery diarrhea and C. difficile PCR was positive whereas C. difficile toxin was negative with a comment about possible colonization.  Patient is on probiotics and oral vancomycin for presumed C. difficile and a GI consult requested for further evaluation and management.      For last 4 days he is noted to have acute watery diarrhea despite being on rifaximin and vancomycin. Stool workup showed fecal lactoferrin is positive.  GI panel PCR, enteric parasite are negative.  Celiac titers negative.  Hemoglobin is stable between 10 to 9 g/dL.  LFTs within normal range.  Total protein and albumin is on the lower side with value of 5.5 and 2.4 g/dL.    Yesterday, I started him on subcutaneous octreotide.  Fecal calprotectin, pancreatic elastase and stool electrolytes were unable to be performed due to insufficient stool sample.     Patient is awake alert and oriented x 3.  He looks a lot better.  He had 1 BM with thick consistency last night and 1 BM earlier this morning.  No nausea, vomiting or abdominal pain.      Current Facility-Administered Medications:     !Patient Home Medications Stored on Unit, , Does not apply, BID, Garland Veronica MD    acetaminophen (TYLENOL) tablet 650 mg, 650 mg, Oral, Q4H PRN, 650 mg at 02/20/24 9045 **OR** acetaminophen  (TYLENOL) 160 MG/5ML oral solution 650 mg, 650 mg, Oral, Q4H PRN **OR** acetaminophen (TYLENOL) suppository 650 mg, 650 mg, Rectal, Q4H PRN, Garland Veronica MD    cefTRIAXone (ROCEPHIN) 2000 mg/100 mL 0.9% NS IVPB (MBP), 2,000 mg, Intravenous, Q24H, Saúl Vargas MD, 2,000 mg at 02/21/24 1614    dianeal lo-peter 1.5% (DIANEAL) peritoneal dialysate 1,500 mL, 1,500 mL, Intraperitoneal, 4 Exchanges Daily - Dwell Overnight, Garland Veronica MD, 1,500 mL at 02/22/24 0952    heparin (porcine) 5000 UNIT/ML injection 5,000 Units, 5,000 Units, Subcutaneous, Q12H, Garland Veronica MD, 5,000 Units at 02/22/24 0939    levothyroxine (SYNTHROID, LEVOTHROID) tablet 125 mcg, 125 mcg, Oral, Q AM, Saúl Vargas MD, 125 mcg at 02/22/24 0613    Lidocaine (Anorectal) (LMX 5) 5 % cream cream 1 Application, 1 Application, Topical, TID PRN, Saúl Vargas MD    melatonin tablet 5 mg, 5 mg, Oral, Nightly PRN, Garland Veronica MD    nystatin (MYCOSTATIN) 100,000 unit/mL suspension 500,000 Units, 5 mL, Swish & Swallow, 4x Daily, Coreen Castro MD, 500,000 Units at 02/22/24 0951    octreotide (sandoSTATIN) injection 200 mcg, 200 mcg, Subcutaneous, Q8H, Gonzalez Marroquin MD, 200 mcg at 02/22/24 0613    ondansetron (ZOFRAN) injection 4 mg, 4 mg, Intravenous, Q6H PRN, Garland Veronica MD    oxyCODONE (ROXICODONE) immediate release tablet 10 mg, 10 mg, Oral, Q8H PRN, Saúl Vargas MD, 10 mg at 02/21/24 1904    oxyCODONE (ROXICODONE) immediate release tablet 5 mg, 5 mg, Oral, Q8H PRN, Saúl Vargas MD    polyethyl glycol-propyl glycol (SYSTANE) 0.4-0.3 % ophthalmic solution (artificial tears) 2 drop, 2 drop, Both Eyes, Q1H PRN, Gabriela Workman PA-C    potassium chloride (MICRO-K/KLOR-CON) CR capsule, 20 mEq, Oral, Daily, Rolf Cha MD, 20 mEq at 02/22/24 0938    riFAXIMin (XIFAXAN) tablet 550 mg, 550 mg, Oral, Q8H, Gonzalez Marroquin MD, 550 mg at 02/22/24 0613     sertraline (ZOLOFT) tablet 100 mg, 100 mg, Oral, Nightly, Gabriela Workman PA-C, 100 mg at 02/21/24 2358    sodium bicarbonate tablet 1,300 mg, 1,300 mg, Oral, BID, Garland Veronica MD, 1,300 mg at 02/22/24 0951    sodium chloride 0.9 % flush 10 mL, 10 mL, Intravenous, Q12H, Garland Veronica MD, 10 mL at 02/22/24 0942    sodium chloride 0.9 % flush 10 mL, 10 mL, Intravenous, PRN, Garland Veronica MD    sodium chloride 0.9 % flush 10 mL, 10 mL, Intravenous, Q12H, Saúl Vargas MD, 10 mL at 02/22/24 0941    sodium chloride 0.9 % flush 10 mL, 10 mL, Intravenous, PRN, Saúl Vargas MD    sodium chloride 0.9 % infusion 40 mL, 40 mL, Intravenous, PRN, Garland Veronica MD, 40 mL at 02/21/24 1614    sodium chloride 0.9 % infusion 40 mL, 40 mL, Intravenous, PRN, Saúl Vargas MD    vancomycin (VANCOCIN) capsule 125 mg, 125 mg, Oral, Q6H, Saúl Vargas MD, 125 mg at 02/22/24 0613    Objective     Vital Signs  Temp:  [98.8 °F (37.1 °C)-99.8 °F (37.7 °C)] 98.9 °F (37.2 °C)  Heart Rate:  [75-80] 79  Resp:  [18-20] 18  BP: (108-140)/(58-79) 110/60  Body mass index is 27.8 kg/m².    Intake/Output Summary (Last 24 hours) at 2/22/2024 1256  Last data filed at 2/22/2024 1108  Gross per 24 hour   Intake 6120 ml   Output 5371.5 ml   Net 748.5 ml     I/O this shift:  In: 1620 [P.O.:120]  Out: 1251.6      Physical Exam:  General     Alert and oriented, normal affect   Head:    Normocephalic, without obvious abnormality, atraumatic   Eyes:          conjunctivae and sclerae normal, no icterus, pupils round and reactive to light   Oral cavity: Moist and intact, normal dentation   Neck:   Supple, no adenopathy   Chest Wall/Lungs:    No abnormalities observed, equal on expansion bilaterally, No use of extrarespiratory muscles noted   Abdomen:     Soft, nondistended, nontender; normal bowel sounds, no hepatospleenomegaly, no ascites.  LUQ with peritoneal dialysis catheter   Extremities:    no edema, clubbing, or cyanosis   Skin:   No bruising or rash   Psychiatric:  Normal mood and insight       Results Review:      Results from last 7 days   Lab Units 02/22/24  0630 02/21/24  0451 02/20/24  0518   WBC 10*3/mm3 7.82 8.53 9.05   HEMOGLOBIN g/dL 8.9* 9.6* 9.2*   HEMATOCRIT % 28.5* 29.7* 29.1*   PLATELETS 10*3/mm3 123* 97* 90*     Results from last 7 days   Lab Units 02/22/24  0630 02/21/24  0451 02/20/24  1931   SODIUM mmol/L 137 137 136   POTASSIUM mmol/L 3.6 3.3* 3.0*   CHLORIDE mmol/L 101 101 100   CO2 mmol/L 23.1 22.0 22.9   BUN mg/dL 31* 32* 34*   CREATININE mg/dL 4.96* 4.87* 5.02*   CALCIUM mg/dL 8.8 8.7 8.6   BILIRUBIN mg/dL 0.3 0.4 0.3   ALK PHOS U/L 62 56 66   ALT (SGPT) U/L 15 15 16   AST (SGOT) U/L 31 36 38   GLUCOSE mg/dL 133* 129* 150*     Radiology:  No radiology results for the last day      Impression:      Acute secretory diarrhea-presumed  Fecal lactoferrin positive  Elevated CRP and ESR-multifactorial  Abnormal barium esophagogram (silent tracheal aspiration)  Thrombocytopenia  Normocytic anemia  Recent E. 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  C. difficile PCR was positive with negative toxin.  Fecal lactoferrin positive.  He also has elevated CRP and ESR reflecting towards inflammatory versus secretory diarrhea.  He has a negative GI panel PCR, celiac serology, rule out for SIBO and infectious etiology.  Patient's diarrhea has been improved by the addition of octreotide that usually seen in secretory diarrhea.  Would recommend CT abdomen and pelvis with pancreatic mass protocol to rule out neuroendocrine tumor.  Continue octreotide and rifaximin.  May discontinue oral vancomycin.  At the time of discharge, he should  receive octreotide Depot 30 mg IM every 4 weeks, if he is found to have secretory diarrhea.    Patient is immunocompromise while taking his Skyrizi for autoimmune disorders. He should undergo outpatient colonoscopy with biopsies to rule out colitis as a potential source of acute diarrhea.  Patient also has thrombocytopenia of unclear etiology and should have EGD to rule out varices.  Although, he has normal LFTs but does drink alcohol 1 to 2 cans of beer daily.     I discussed the patients findings and my recommendations with patient, family, nursing staff, and primary care team.      Electronically signed by Gonzalez Marroquin MD, 02/22/24, 12:56 PM EST.

## 2024-02-23 LAB
ALBUMIN SERPL-MCNC: 2.5 G/DL (ref 3.5–5.2)
ALBUMIN SERPL-MCNC: 2.6 G/DL (ref 3.5–5.2)
ALBUMIN/GLOB SERPL: 0.8 G/DL
ALP SERPL-CCNC: 55 U/L (ref 39–117)
ALP SERPL-CCNC: 59 U/L (ref 39–117)
ALT SERPL W P-5'-P-CCNC: 14 U/L (ref 1–41)
ALT SERPL W P-5'-P-CCNC: 15 U/L (ref 1–41)
AMMONIA BLD-SCNC: 16 UMOL/L (ref 16–60)
ANION GAP SERPL CALCULATED.3IONS-SCNC: 11.7 MMOL/L (ref 5–15)
ANION GAP SERPL CALCULATED.3IONS-SCNC: 12.7 MMOL/L (ref 5–15)
AST SERPL-CCNC: 23 U/L (ref 1–40)
AST SERPL-CCNC: 25 U/L (ref 1–40)
BACTERIA SPEC AEROBE CULT: NORMAL
BACTERIA SPEC AEROBE CULT: NORMAL
BASOPHILS # BLD AUTO: 0.04 10*3/MM3 (ref 0–0.2)
BASOPHILS # BLD AUTO: 0.05 10*3/MM3 (ref 0–0.2)
BASOPHILS NFR BLD AUTO: 0.5 % (ref 0–1.5)
BASOPHILS NFR BLD AUTO: 0.6 % (ref 0–1.5)
BILIRUB CONJ SERPL-MCNC: <0.2 MG/DL (ref 0–0.3)
BILIRUB INDIRECT SERPL-MCNC: ABNORMAL MG/DL
BILIRUB SERPL-MCNC: 0.2 MG/DL (ref 0–1.2)
BILIRUB SERPL-MCNC: 0.2 MG/DL (ref 0–1.2)
BUN SERPL-MCNC: 28 MG/DL (ref 8–23)
BUN SERPL-MCNC: 30 MG/DL (ref 8–23)
BUN/CREAT SERPL: 5.7 (ref 7–25)
BUN/CREAT SERPL: 6.2 (ref 7–25)
CALCIUM SPEC-SCNC: 8.5 MG/DL (ref 8.6–10.5)
CALCIUM SPEC-SCNC: 8.7 MG/DL (ref 8.6–10.5)
CHLORIDE SERPL-SCNC: 102 MMOL/L (ref 98–107)
CHLORIDE SERPL-SCNC: 99 MMOL/L (ref 98–107)
CO2 SERPL-SCNC: 24.3 MMOL/L (ref 22–29)
CO2 SERPL-SCNC: 25.3 MMOL/L (ref 22–29)
CREAT SERPL-MCNC: 4.82 MG/DL (ref 0.76–1.27)
CREAT SERPL-MCNC: 4.92 MG/DL (ref 0.76–1.27)
DEPRECATED RDW RBC AUTO: 57.1 FL (ref 37–54)
DEPRECATED RDW RBC AUTO: 57.1 FL (ref 37–54)
EGFRCR SERPLBLD CKD-EPI 2021: 11.4 ML/MIN/1.73
EGFRCR SERPLBLD CKD-EPI 2021: 11.7 ML/MIN/1.73
EOSINOPHIL # BLD AUTO: 0.37 10*3/MM3 (ref 0–0.4)
EOSINOPHIL # BLD AUTO: 0.38 10*3/MM3 (ref 0–0.4)
EOSINOPHIL NFR BLD AUTO: 4.5 % (ref 0.3–6.2)
EOSINOPHIL NFR BLD AUTO: 4.7 % (ref 0.3–6.2)
ERYTHROCYTE [DISTWIDTH] IN BLOOD BY AUTOMATED COUNT: 16 % (ref 12.3–15.4)
ERYTHROCYTE [DISTWIDTH] IN BLOOD BY AUTOMATED COUNT: 16.2 % (ref 12.3–15.4)
GLOBULIN UR ELPH-MCNC: 3.3 GM/DL
GLUCOSE SERPL-MCNC: 187 MG/DL (ref 65–99)
GLUCOSE SERPL-MCNC: 205 MG/DL (ref 65–99)
HCT VFR BLD AUTO: 24.6 % (ref 37.5–51)
HCT VFR BLD AUTO: 28.5 % (ref 37.5–51)
HGB BLD-MCNC: 7.6 G/DL (ref 13–17.7)
HGB BLD-MCNC: 8.8 G/DL (ref 13–17.7)
IMM GRANULOCYTES # BLD AUTO: 0.63 10*3/MM3 (ref 0–0.05)
IMM GRANULOCYTES # BLD AUTO: 0.65 10*3/MM3 (ref 0–0.05)
IMM GRANULOCYTES NFR BLD AUTO: 7.8 % (ref 0–0.5)
IMM GRANULOCYTES NFR BLD AUTO: 8 % (ref 0–0.5)
LYMPHOCYTES # BLD AUTO: 1.68 10*3/MM3 (ref 0.7–3.1)
LYMPHOCYTES # BLD AUTO: 1.69 10*3/MM3 (ref 0.7–3.1)
LYMPHOCYTES NFR BLD AUTO: 20 % (ref 19.6–45.3)
LYMPHOCYTES NFR BLD AUTO: 21.5 % (ref 19.6–45.3)
MAGNESIUM SERPL-MCNC: 1.8 MG/DL (ref 1.6–2.4)
MCH RBC QN AUTO: 30.4 PG (ref 26.6–33)
MCH RBC QN AUTO: 30.4 PG (ref 26.6–33)
MCHC RBC AUTO-ENTMCNC: 30.9 G/DL (ref 31.5–35.7)
MCHC RBC AUTO-ENTMCNC: 30.9 G/DL (ref 31.5–35.7)
MCV RBC AUTO: 98.4 FL (ref 79–97)
MCV RBC AUTO: 98.6 FL (ref 79–97)
MONOCYTES # BLD AUTO: 0.95 10*3/MM3 (ref 0.1–0.9)
MONOCYTES # BLD AUTO: 1.06 10*3/MM3 (ref 0.1–0.9)
MONOCYTES NFR BLD AUTO: 12.1 % (ref 5–12)
MONOCYTES NFR BLD AUTO: 12.6 % (ref 5–12)
NEUTROPHILS NFR BLD AUTO: 4.17 10*3/MM3 (ref 1.7–7)
NEUTROPHILS NFR BLD AUTO: 4.57 10*3/MM3 (ref 1.7–7)
NEUTROPHILS NFR BLD AUTO: 53.1 % (ref 42.7–76)
NEUTROPHILS NFR BLD AUTO: 54.6 % (ref 42.7–76)
NRBC BLD AUTO-RTO: 0.3 /100 WBC (ref 0–0.2)
NRBC BLD AUTO-RTO: 0.4 /100 WBC (ref 0–0.2)
PHOSPHATE SERPL-MCNC: 3.8 MG/DL (ref 2.5–4.5)
PLATELET # BLD AUTO: 132 10*3/MM3 (ref 140–450)
PLATELET # BLD AUTO: 150 10*3/MM3 (ref 140–450)
PMV BLD AUTO: 10.3 FL (ref 6–12)
PMV BLD AUTO: 10.4 FL (ref 6–12)
POTASSIUM SERPL-SCNC: 3.5 MMOL/L (ref 3.5–5.2)
POTASSIUM SERPL-SCNC: 3.9 MMOL/L (ref 3.5–5.2)
PROT SERPL-MCNC: 5.9 G/DL (ref 6–8.5)
PROT SERPL-MCNC: 5.9 G/DL (ref 6–8.5)
RBC # BLD AUTO: 2.5 10*6/MM3 (ref 4.14–5.8)
RBC # BLD AUTO: 2.89 10*6/MM3 (ref 4.14–5.8)
SODIUM SERPL-SCNC: 136 MMOL/L (ref 136–145)
SODIUM SERPL-SCNC: 139 MMOL/L (ref 136–145)
WBC NRBC COR # BLD AUTO: 7.86 10*3/MM3 (ref 3.4–10.8)
WBC NRBC COR # BLD AUTO: 8.38 10*3/MM3 (ref 3.4–10.8)

## 2024-02-23 PROCEDURE — 84100 ASSAY OF PHOSPHORUS: CPT | Performed by: FAMILY MEDICINE

## 2024-02-23 PROCEDURE — 25010000002 OCTREOTIDE PER 25 MCG: Performed by: INTERNAL MEDICINE

## 2024-02-23 PROCEDURE — 80053 COMPREHEN METABOLIC PANEL: CPT | Performed by: FAMILY MEDICINE

## 2024-02-23 PROCEDURE — 25810000003 SODIUM CHLORIDE 0.9 % SOLUTION 500 ML FLEX CONT: Performed by: FAMILY MEDICINE

## 2024-02-23 PROCEDURE — 99232 SBSQ HOSP IP/OBS MODERATE 35: CPT | Performed by: INTERNAL MEDICINE

## 2024-02-23 PROCEDURE — 25010000002 HEPARIN (PORCINE) PER 1000 UNITS: Performed by: INTERNAL MEDICINE

## 2024-02-23 PROCEDURE — 83735 ASSAY OF MAGNESIUM: CPT | Performed by: FAMILY MEDICINE

## 2024-02-23 PROCEDURE — 82438 ASSAY OTHER FLUID CHLORIDES: CPT | Performed by: INTERNAL MEDICINE

## 2024-02-23 PROCEDURE — 84302 ASSAY OF SWEAT SODIUM: CPT | Performed by: INTERNAL MEDICINE

## 2024-02-23 PROCEDURE — 84999 UNLISTED CHEMISTRY PROCEDURE: CPT | Performed by: INTERNAL MEDICINE

## 2024-02-23 PROCEDURE — 85025 COMPLETE CBC W/AUTO DIFF WBC: CPT | Performed by: FAMILY MEDICINE

## 2024-02-23 PROCEDURE — 82248 BILIRUBIN DIRECT: CPT | Performed by: FAMILY MEDICINE

## 2024-02-23 PROCEDURE — 25010000002 EPOETIN ALFA PER 1000 UNITS: Performed by: INTERNAL MEDICINE

## 2024-02-23 PROCEDURE — 25010000002 CEFTRIAXONE PER 250 MG: Performed by: FAMILY MEDICINE

## 2024-02-23 PROCEDURE — 99232 SBSQ HOSP IP/OBS MODERATE 35: CPT | Performed by: FAMILY MEDICINE

## 2024-02-23 PROCEDURE — 82140 ASSAY OF AMMONIA: CPT | Performed by: FAMILY MEDICINE

## 2024-02-23 RX ORDER — CEFTRIAXONE 1 G/1
2 INJECTION, POWDER, FOR SOLUTION INTRAMUSCULAR; INTRAVENOUS EVERY 24 HOURS
Status: ON HOLD
Start: 2024-02-23 | End: 2024-03-05

## 2024-02-23 RX ORDER — GUAIFENESIN 600 MG/1
600 TABLET, EXTENDED RELEASE ORAL EVERY 12 HOURS SCHEDULED
Status: DISCONTINUED | OUTPATIENT
Start: 2024-02-23 | End: 2024-02-24 | Stop reason: HOSPADM

## 2024-02-23 RX ADMIN — SODIUM CHLORIDE, SODIUM LACTATE, CALCIUM CHLORIDE, MAGNESIUM CHLORIDE AND DEXTROSE 1500 ML: 1.5; 538; 448; 18.3; 5.08 INJECTION, SOLUTION INTRAPERITONEAL at 17:42

## 2024-02-23 RX ADMIN — SERTRALINE HYDROCHLORIDE 100 MG: 100 TABLET ORAL at 21:31

## 2024-02-23 RX ADMIN — VANCOMYCIN HYDROCHLORIDE 125 MG: 125 CAPSULE ORAL at 11:34

## 2024-02-23 RX ADMIN — OCTREOTIDE ACETATE 200 MCG: 50 INJECTION, SOLUTION INTRAVENOUS; SUBCUTANEOUS at 23:59

## 2024-02-23 RX ADMIN — NYSTATIN 500000 UNITS: 100000 SUSPENSION ORAL at 11:34

## 2024-02-23 RX ADMIN — RIFAXIMIN 550 MG: 550 TABLET ORAL at 13:40

## 2024-02-23 RX ADMIN — RIFAXIMIN 550 MG: 550 TABLET ORAL at 05:16

## 2024-02-23 RX ADMIN — RIFAXIMIN 550 MG: 550 TABLET ORAL at 21:31

## 2024-02-23 RX ADMIN — SODIUM BICARBONATE 650 MG TABLET 1300 MG: at 09:47

## 2024-02-23 RX ADMIN — SODIUM CHLORIDE, SODIUM LACTATE, CALCIUM CHLORIDE, MAGNESIUM CHLORIDE AND DEXTROSE 1500 ML: 1.5; 538; 448; 18.3; 5.08 INJECTION, SOLUTION INTRAPERITONEAL at 12:59

## 2024-02-23 RX ADMIN — Medication 10 ML: at 08:24

## 2024-02-23 RX ADMIN — SODIUM BICARBONATE 650 MG TABLET 1300 MG: at 21:32

## 2024-02-23 RX ADMIN — GUAIFENESIN 600 MG: 600 TABLET ORAL at 13:40

## 2024-02-23 RX ADMIN — SODIUM CHLORIDE, SODIUM LACTATE, CALCIUM CHLORIDE, MAGNESIUM CHLORIDE AND DEXTROSE 1500 ML: 1.5; 538; 448; 18.3; 5.08 INJECTION, SOLUTION INTRAPERITONEAL at 23:57

## 2024-02-23 RX ADMIN — VANCOMYCIN HYDROCHLORIDE 125 MG: 125 CAPSULE ORAL at 05:15

## 2024-02-23 RX ADMIN — LEVOTHYROXINE SODIUM 125 MCG: 125 TABLET ORAL at 05:16

## 2024-02-23 RX ADMIN — HEPARIN SODIUM 5000 UNITS: 5000 INJECTION INTRAVENOUS; SUBCUTANEOUS at 09:47

## 2024-02-23 RX ADMIN — NYSTATIN 500000 UNITS: 100000 SUSPENSION ORAL at 17:41

## 2024-02-23 RX ADMIN — SODIUM CHLORIDE 40 ML: 9 INJECTION, SOLUTION INTRAVENOUS at 13:41

## 2024-02-23 RX ADMIN — POTASSIUM CHLORIDE 20 MEQ: 10 CAPSULE, COATED, EXTENDED RELEASE ORAL at 09:47

## 2024-02-23 RX ADMIN — ACETAMINOPHEN 650 MG: 325 TABLET ORAL at 11:42

## 2024-02-23 RX ADMIN — GUAIFENESIN 600 MG: 600 TABLET ORAL at 21:31

## 2024-02-23 RX ADMIN — Medication 5 MG: at 21:31

## 2024-02-23 RX ADMIN — Medication 10 ML: at 21:31

## 2024-02-23 RX ADMIN — HEPARIN SODIUM 5000 UNITS: 5000 INJECTION INTRAVENOUS; SUBCUTANEOUS at 21:31

## 2024-02-23 RX ADMIN — NYSTATIN 500000 UNITS: 100000 SUSPENSION ORAL at 09:47

## 2024-02-23 RX ADMIN — CEFTRIAXONE SODIUM 2000 MG: 2 INJECTION, POWDER, FOR SOLUTION INTRAMUSCULAR; INTRAVENOUS at 13:40

## 2024-02-23 RX ADMIN — Medication 10 ML: at 21:32

## 2024-02-23 RX ADMIN — NYSTATIN 500000 UNITS: 100000 SUSPENSION ORAL at 21:32

## 2024-02-23 RX ADMIN — ERYTHROPOIETIN 30000 UNITS: 20000 INJECTION, SOLUTION INTRAVENOUS; SUBCUTANEOUS at 09:47

## 2024-02-23 RX ADMIN — VANCOMYCIN HYDROCHLORIDE 125 MG: 125 CAPSULE ORAL at 17:41

## 2024-02-23 RX ADMIN — SODIUM CHLORIDE, SODIUM LACTATE, CALCIUM CHLORIDE, MAGNESIUM CHLORIDE AND DEXTROSE 1500 ML: 1.5; 538; 448; 18.3; 5.08 INJECTION, SOLUTION INTRAPERITONEAL at 08:24

## 2024-02-23 RX ADMIN — Medication 10 ML: at 00:39

## 2024-02-23 NOTE — PROGRESS NOTES
Pulmonary / Critical Care Progress Note      Patient Name: Nelson Wang  : 1945  MRN: [id number]  Attending:  Saúl Vargas MD   Date of admission: 2024    Subjective   Subjective   Follow-up for septic shock, right lower lobe pneumonia    On room air  Reports increasing cough this morning  CT of the abdomen pelvis yesterday did show some right lower lobe airspace disease and tiny right effusion  Does have issues with aspiration  No dyspnea  Still has ongoing issues with diarrhea  Weak and fatigue  Low-grade temperature      Objective   Objective     Vitals:   Vital signs for last 24 hours:  Temp:  [98.9 °F (37.2 °C)-100.2 °F (37.9 °C)] 99.5 °F (37.5 °C)  Heart Rate:  [76-84] 81  Resp:  [18-20] 18  BP: (110-142)/(60-74) 122/68    Intake/Output last 3 shifts:  I/O last 3 completed shifts:  In: 7860 [P.O.:360]  Out: 7457.5   Intake/Output this shift:  No intake/output data recorded.    Physical Exam   Vital Signs Reviewed   WDWN, Alert, NAD.  On room air  HEENT:  PERRL, EOMI.  OP, nares clear  Chest:  good aeration, unchanged right base rhonchi, tympanic to percussion bilaterally, no work of breathing noted  CV: RRR, no MGR, pulses 2+, equal.  Abd:  Soft, NT, ND, + BS, no HSM  EXT:  no clubbing, no cyanosis, no edema  Neuro:  A&Ox3, CN grossly intact, no focal deficits.  Skin: No rashes or lesions noted, PD cath in place    Result Review    Result Review:  I have personally reviewed the results from the time of this admission to 2024 08:07 EST and agree with these findings:  [x]  Laboratory  [x]  Microbiology  [x]  Radiology  []  EKG/Telemetry   [x]  Cardiology/Vascular   []  Pathology  []  Old records  []  Other:  Most notable findings include:   Blood culture / gram-negative bacilli, BC ID E. coli  C. difficile positive                      Lab 24  0420 24  0342 24  0630 24  0451 24  1931 24  0518 24  0501 24  0433 24  0528  02/17/24  0230   WBC 8.38  --  7.82 8.53  --  9.05 13.99*  --  11.50* 9.71   HEMOGLOBIN 7.6*  --  8.9* 9.6*  --  9.2* 9.9*  --  9.7* 10.0*   HEMATOCRIT 24.6*  --  28.5* 29.7*  --  29.1* 30.7*  --  29.9* 32.1*   PLATELETS 132*  --  123* 97*  --  90* 82*  --  75* 102*   SODIUM  --  136 137 137 136 137  --  136 135* 136   POTASSIUM  --  3.9 3.6 3.3* 3.0* 3.1*  --  3.3* 3.2* 3.7   CHLORIDE  --  99 101 101 100 101  --  99 97* 99   CO2  --  24.3 23.1 22.0 22.9 22.5  --  21.5* 22.6 19.6*   BUN  --  28* 31* 32* 34* 36*  --  42* 43* 44*   CREATININE  --  4.92* 4.96* 4.87* 5.02* 5.24*  --  5.54* 5.85* 6.62*   GLUCOSE  --  205* 133* 129* 150* 124*  --  115* 78 162*   CALCIUM  --  8.5* 8.8 8.7 8.6 8.8  --  8.9 8.6 7.7*   PHOSPHORUS  --  3.8 4.0 2.8 3.0 3.2  --  3.4 3.7 5.3*   TOTAL PROTEIN  --  5.9* 5.5* 5.4* 5.8* 5.5*  --  5.9* 5.5*  --    ALBUMIN  --  2.5* 2.4* 2.5* 2.5* 2.5*  --  2.5* 2.6*  --    GLOBULIN  --   --   --   --  3.3  --   --  3.4 2.9  --      CT Chest Without Contrast Diagnostic    Result Date: 2/18/2024  PROCEDURE: CT CHEST WO CONTRAST DIAGNOSTIC  COMPARISON: [facility], CT, CT CHEST WO CONTRAST DIAGNOSTIC, 12/06/2023, 11:11.  [facility], CT, CT ABDOMEN PELVIS WO CONTRAST, 2/16/2024, 14:16.  INDICATIONS: SHORTNESS OF BREATH  PROTOCOL:   Standard imaging protocol performed    RADIATION:   DLP: 313 mGy*cm   Automated exposure control was utilized to minimize radiation dose.  TECHNIQUE: Axial images of the chest without intravenous contrast.  FINDINGS: Slight improvement in right lower lobe airspace consolidation.  Areas of ground-glass opacity in the right mid and lower lung field appear stable.  No cavitary lesions are identified.  Stable pneumoperitoneum and abdominal free fluid likely related to peritoneal dialysis.  Coronary artery calcification is present.  The thoracic aorta has a normal caliber.  There is no mediastinal, axillary or hilar adenopathy.  Right IJ central  line tip is in the SVC.  There is mild gynecomastia.  No evidence of pneumothorax.  No evidence of significant pleural or pericardial effusion.  Degenerative changes are present in the thoracic spine.  IMPRESSION:  Slight improvement in the right lower lobe airspace consolidation.  EPIFANIO RENTERIA MD       Electronically Signed and Approved By: EPIFANIO RENTERIA MD on 2/18/2024 at 10:04              CT Abdomen Pelvis With Contrast    Result Date: 2/22/2024  PROCEDURE: CT ABDOMEN PELVIS W CONTRAST  COMPARISON: [facility], CT, CT ABDOMEN PELVIS WO CONTRAST, 2/16/2024, 14:16.  INDICATIONS: ABDOMINAL PAIN, DIARRHEA  TECHNIQUE: After obtaining the patient's consent, CT images were created with non-ionic intravenous contrast material.   PROTOCOL:   Standard imaging protocol performed    RADIATION:   DLP: 769 mGy*cm   Automated exposure control was utilized to minimize radiation dose. CONTRAST: 100cc Isovue 370 I.V. oral contrast administered as well.  FINDINGS:  There is continued right lower lobe pneumonia.  There is a new tiny right pleural effusion.  There is bilateral gynecomastia.  There is coronary artery disease in addition to diffuse atherosclerotic disease.  No aortic aneurysm.  Gallstones are present in the gallbladder.  No biliary obstruction.  There is bilateral renal cortical thinning, and there are bilateral renal cysts.  No hydronephrosis is identified.  A large stone is again seen in the right renal pelvis measuring about 12 mm in length, unchanged.  The solid abdominal organs are otherwise within normal limits.  Urinary bladder is not well distended but is grossly normal.  Prostate unremarkable.  Right hip arthroplasty is present.  Peritoneal dialysis catheter is coiled in the pelvis.  Free fluid throughout the abdomen and pelvis is presumed dialysate.  Volume has increased since the prior study.  A few scattered bubbles of free intraperitoneal air are also presumed related to the dialysis  catheter.  There is a small umbilical hernia containing some fluid.  The appendix is surgically absent.  There is colonic diverticulosis without definite focal diverticulitis.  No definitive evidence of acute colitis.  No small bowel obstruction or evidence of enteritis.  The stomach is normal.  No adenopathy is identified.  Degenerative change in the lumbar spine and visualized thoracic spine is stable.  Chronic compression fracture at L3 is unchanged.      Impression:   1. Persistent right lower lobe pneumonia within new tiny right pleural effusion. 2. No clearly acute findings in the GI tract.  No evidence of acute colitis or enteritis.  No bowel obstruction.  There is colonic diverticulosis without diverticulitis. 3. Stable nonobstructing large stone in the right renal pelvis.  No hydronephrosis on either side. 4. Cholelithiasis without biliary obstruction. 5. Increase volume of free fluid in the abdomen and pelvis, likely dialysate. 6. Appendectomy and right hip arthroplasty.      ERIC ELLISON MD       Electronically Signed and Approved By: ERIC ELLISON MD on 2/22/2024 at 22:44             CT Abdomen Pelvis Without Contrast    Result Date: 2/16/2024  PROCEDURE: CT ABDOMEN PELVIS WO CONTRAST  COMPARISON: [facility], CR, XR CHEST 1 VW, 2/16/2024, 13:50.  [facility], CR, XR CHEST PA AND LATERAL, 9/19/2023, 10:40.  [facility], CR, XR CHEST 1 VW, 2/16/2024, 7:55.  [facility], CT, CT CHEST WO CONTRAST DIAGNOSTIC, 12/06/2023, 11:11.  INDICATIONS: peritonitis, persistent fever  TECHNIQUE: CT images were created without intravenous contrast.   PROTOCOL:   Standard imaging protocol performed    RADIATION:   DLP: 268mGy*cm   Automated exposure control was utilized to minimize radiation dose.  FINDINGS:  Extensive right lower lobe pneumonia.  Left lung base is clear.  Coronary artery calcifications present.  Lack of contrast limits assessment  of abdominal organs and vasculature.  The liver is normal in size and contour.  There is elevation of the right hemidiaphragm.  Spleen and adrenal glands are unremarkable.  Pancreas without findings of pancreatitis.  Gallbladder present.  No pericholecystic inflammation.  Several low-attenuation bilateral renal lesions likely cysts, similar to the prior study, incompletely assessed without contrast.  There is a chronic calculus at the right renal pelvis which measures 1.1 cm x 0.8 cm, unchanged.  Negative for hydronephrosis or hydroureter.  No obstructing distal ureteral calculus.  Urinary bladder is collapsed.  No bowel obstruction.  Colonic diverticulosis without diverticulitis.  Pneumoperitoneum in the abdomen similar to prior chest CT likely related to patient's peritoneal dialysis catheter which is located in the pelvis.  No fluid collection surrounding the dialysis catheter.  Stomach and proximal small bowel are normally configured.  No findings of appendicitis.  Extensive vascular calcifications of the abdominal aorta and branch vasculature without aneurysm.  Chronic height loss at the superior endplate of L3 with partial ankylosis at the L2-3 level.  Chronic retropulsion of the posterior aspect of the mid L3 vertebral body to the canal contribute to suspected moderate canal stenosis at L3.  No aggressive osseous lesion or acute fracture.  Right hip prosthesis partially imaged.      Impression:   1. Extensive right lower lobe pneumonia. 2. Pneumoperitoneum similar to prior chest CT likely related to peritoneal dialysis catheter.  No organized fluid collection or abscess. 3. Nonobstructing 11 mm calculus at the right renal pelvis unchanged. 4. Additional chronic findings above.     LISY SANTILLAN MD       Electronically Signed and Approved By: LISY SANTILLAN MD on 2/16/2024 at 14:46              Assessment & Plan   Assessment / Plan     Active Hospital Problems:  Active Hospital Problems    Diagnosis      **Peritonitis      Impression:  Septic shock, present on admission  Right lower lobe pneumonia of unspecified organism  E. coli bacteremia  C. difficile colitis  Aspiration pneumonia of right lower lobe.  Suspect related to E. coli  Acute on chronic diarrhea  Clinically significant lactic acidosis  ESRD on peritoneal dialysis  Anemia of CKD  Hypokalemia  Anion gap metabolic acidosis  Psoriasis on chronic immunosuppression    Plan:  On room air  Does have worsening right lower lobe airspace disease and tiny right effusion on CT of the abdomen and pelvis.  I suspect he is having ongoing issues with aspiration.  Also prior he has some atelectasis playing a role  To aspiration precautions  Diet per speech therapy  Continue ceftriaxone for E. coli bacteremia  Continue oral vancomycin for C. difficile colitis  Diarrhea per GI.  Appreciate assistance  Repeat noncontrast chest CT in 3 months as outpatient  Continue midodrine 5 mg 3 times daily  PD per nephrology. Trend renal panel and electrolytes.   Encourage activity and incentive spirometer use    DVT prophylaxis:  Medical and mechanical DVT prophylaxis orders are present.    CODE STATUS:   Level Of Support Discussed With: Patient  Code Status (Patient has no pulse and is not breathing): CPR (Attempt to Resuscitate)  Medical Interventions (Patient has pulse or is breathing): Full Support      Labs, imaging, microbiology, notes and medications personally reviewed  Discussed with primary    Electronically signed by Scottie Valentin MD, 02/23/24, 1:50 PM EST.

## 2024-02-23 NOTE — PROGRESS NOTES
[facility]   Hospitalist Progress Note  Date: 2024  Patient Name: Nelson Wang  : 1945  MRN: [id number]  Date of admission: 2024      Subjective   Subjective       Chief complaint: Fever and weakness    Summary:  79-year-old male with history of ESRD on peritoneal dialysis, hypertension, diabetes mellitus, dyslipidemia, hypothyroidism, hyperuricemia/gout, vitiligo, immunosuppressed on Skyrizi, hospitalized on 2024 with chief complaint of fevers and weakness, found to be in septic shock due to E. coli bacteremia and right lower lobe pneumonia, with C. difficile positive stools, with concern for acute peritonitis, placed on broad-spectrum antibiotics, nephrology and critical care consulted, required Levophed, central line placed, Levophed weaned, transferred out of the ICU after starting midodrine, C. difficile positive stools, vancomycin orally started, GI consulted with persistent ongoing diarrhea.  Speech therapy consulted with concern for aspiration, modified barium swallow indicated laryngeal penetration and silent aspiration with thin liquids, recommended nectar thick solids, no straws.  Midline placed.    Interval follow-up: Seen and examined this morning, was in no acute distress, no acute major overnight events, question about diarrhea worsening overnight. Appears well this morning and continues .  No nausea or vomiting.  No chest pain or palpitations.  Remains weak and fatigued.  Creatinine 4.92, BUN 20, potassium 3.9, white blood cell count 8000, hemoglobin 7.6.  Perianal skin is improving some.  Has a few raw spots.    Review of systems:  All systems reviewed negative except for less diarrhea, weakness, generalized fatigue    Objective   Objective     Vitals:   Temp:  [99.1 °F (37.3 °C)-100.2 °F (37.9 °C)] 99.1 °F (37.3 °C)  Heart Rate:  [76-84] 77  Resp:  [18-20] 18  BP: (112-142)/(68-74) 136/70  Physical Exam    Constitutional: Awake, alert, no acute distress sitting upright     Eyes: Pupils equal, sclerae anicteric, no conjunctival injection   HENT: NCAT, mucous membranes moist   Neck: Supple, full range of motion  Respiratory: Diminished coarse breath sounds mostly on the right side lower lobe   Cardiovascular: RRR, no murmurs, rubs, or gallops, palpable pedal pulses bilaterally   Gastrointestinal: Positive bowel sounds, soft, nontender, distended, PD catheter in place while going fluid exchange   Musculoskeletal: No bilateral ankle edema, no clubbing or cyanosis to extremities   Psychiatric: Appropriate affect, cooperative   Neurologic: Oriented x 3, strength symmetric in all extremities, Cranial Nerves grossly intact to confrontation, speech clear   Skin: No rashes visible on exposed skin; rectal exam deferred    Result Review    Result Review:  I have personally reviewed the pertinent results from the past 24 hours to 2/23/2024 13:04 EST and agree with these findings:  [x]  Laboratory   CBC          2/21/2024    04:51 2/22/2024    06:30 2/23/2024    04:20   CBC   WBC 8.53  7.82  8.38    RBC 3.13  2.94  2.50    Hemoglobin 9.6  8.9  7.6    Hematocrit 29.7  28.5  24.6    MCV 94.9  96.9  98.4    MCH 30.7  30.3  30.4    MCHC 32.3  31.2  30.9    RDW 15.8  15.9  16.0    Platelets 97  123  132      BMP          2/21/2024    04:51 2/22/2024    06:30 2/23/2024    03:42   BMP   BUN 32  31  28    Creatinine 4.87  4.96  4.92    Sodium 137  137  136    Potassium 3.3  3.6  3.9    Chloride 101  101  99    CO2 22.0  23.1  24.3    Calcium 8.7  8.8  8.5      LIVER FUNCTION TESTS:      Lab 02/23/24  0342 02/22/24  0630 02/21/24  0451 02/20/24  1931 02/20/24  0518 02/19/24  0433 02/18/24  0528   TOTAL PROTEIN 5.9* 5.5* 5.4* 5.8* 5.5* 5.9* 5.5*   ALBUMIN 2.5* 2.4* 2.5* 2.5* 2.5* 2.5* 2.6*   GLOBULIN  --   --   --  3.3  --  3.4 2.9   ALT (SGPT) 15 15 15 16 14 15 13   AST (SGOT) 25 31 36 38 31 30 30   BILIRUBIN 0.2 0.3 0.4 0.3 0.3 0.4 0.5   INDIRECT BILIRUBIN  --  0.1 0.2  --  0.1  --   --    BILIRUBIN  DIRECT <0.2 0.2 0.2  --  0.2  --   --    ALK PHOS 55 62 56 66 59 67 50       [x]  Microbiology   Microbiology Results (last 10 days)       Procedure Component Value - Date/Time    Gastrointestinal Panel, PCR - Stool, Per Rectum [[id number]]  (Normal) Collected: 02/21/24 1402    Lab Status: Final result Specimen: Stool from Per Rectum Updated: 02/21/24 1827     Campylobacter Not Detected     Plesiomonas shigelloides Not Detected     Salmonella Not Detected     Vibrio Not Detected     Vibrio cholerae Not Detected     Yersinia enterocolitica Not Detected     Enteroaggregative E. coli (EAEC) Not Detected     Enteropathogenic E. coli (EPEC) Not Detected     Enterotoxigenic E. coli (ETEC) lt/st Not Detected     Shiga-like toxin-producing E. coli (STEC) stx1/stx2 Not Detected     Shigella/Enteroinvasive E. coli (EIEC) Not Detected     Cryptosporidium Not Detected     Cyclospora cayetanensis Not Detected     Entamoeba histolytica Not Detected     Giardia lamblia Not Detected     Adenovirus F40/41 Not Detected     Astrovirus Not Detected     Norovirus GI/GII Not Detected     Rotavirus A Not Detected     Sapovirus (I, II, IV or V) Not Detected    Narrative:      If Aeromonas, Staphylococcus aureus or Bacillus cereus are suspected, please order [id number]: Stool Culture, Aeromonas, S aureus, B Cereus.    Enteric Bacterial Panel - Stool, Per Rectum [[id number]]  (Normal) Collected: 02/20/24 1121    Lab Status: Final result Specimen: Stool from Per Rectum Updated: 02/21/24 0946     Salmonella Not Detected     Campylobacter Not Detected     Shigella/Enteroinvasive E. coli (EIEC) Not Detected     Shiga-like toxin-producing E. coli (STEC) stx1/stx2 Not Detected    Enteric Parasite Panel - Stool, Per Rectum [[id number]]  (Normal) Collected: 02/20/24 1121    Lab Status: Final result Specimen: Stool from Per Rectum Updated: 02/21/24 0951     Giardia lamblia Not Detected     Cryptosporidium Not Detected     Entamoeba histolytica Not Detected     Blood Culture - Blood, Hand, Right [[id number]]  (Normal) Collected: 02/18/24 1441    Lab Status: Preliminary result Specimen: Blood from Hand, Right Updated: 02/22/24 1515     Blood Culture No growth at 4 days    Blood Culture - Blood, Neck [[id number]]  (Normal) Collected: 02/18/24 1440    Lab Status: Preliminary result Specimen: Blood from Neck Updated: 02/22/24 1515     Blood Culture No growth at 4 days    Clostridioides difficile Toxin - Stool, Per Rectum [[id number]]  (Abnormal) Collected: 02/16/24 2010    Lab Status: Final result Specimen: Stool from Per Rectum Updated: 02/16/24 2138    Narrative:      The following orders were created for panel order Clostridioides difficile Toxin - Stool, Per Rectum.  Procedure                               Abnormality         Status                     ---------                               -----------         ------                     Clostridioides difficile...[[id number]]  Abnormal            Final result                 Please view results for these tests on the individual orders.    Clostridioides difficile Toxin, PCR - Stool, Per Rectum [[id number]]  (Abnormal) Collected: 02/16/24 2010    Lab Status: Final result Specimen: Stool from Per Rectum Updated: 02/16/24 2138     Toxigenic C. difficile by PCR Positive     027 Toxin Presumptive Negative    Narrative:      DNA from a toxigenic strain of C.difficile has been detected. Antigen testing for the presence of free C.difficile toxin is currently in progress, to help determine the clinical significance of this PCR result.     Enteric Bacterial Panel - Stool, Per Rectum [[id number]]  (Normal) Collected: 02/16/24 2010    Lab Status: Final result Specimen: Stool from Per Rectum Updated: 02/17/24 1248     Salmonella Not Detected     Campylobacter Not Detected     Shigella/Enteroinvasive E. coli (EIEC) Not Detected     Shiga-like toxin-producing E. coli (STEC) stx1/stx2 Not Detected    Clostridioides difficile toxin Ag,  Reflex - Stool, Per Rectum [[id number]]  (Normal) Collected: 02/16/24 2010    Lab Status: Final result Specimen: Stool from Per Rectum Updated: 02/16/24 2139     C.diff Toxin Ag Negative    Narrative:      DNA from a toxigenic strain of C.difficile was detected, although the free toxin itself was not detected. These findings are consistent with C.difficile colonization and may not reflect actual C.difficile infection. Clinical correlation needed.    MRSA Screen, PCR (Inpatient) - Swab, Nares [[id number]]  (Normal) Collected: 02/16/24 1447    Lab Status: Final result Specimen: Swab from Nares Updated: 02/16/24 1635     MRSA PCR No MRSA Detected    Narrative:      The negative predictive value of this diagnostic test is high and should only be used to consider de-escalating anti-MRSA therapy. A positive result may indicate colonization with MRSA and must be correlated clinically.    Respiratory Panel PCR w/COVID-19(SARS-CoV-2) RA/TANIA/GAVIN/PAD/COR/NENA In-House, NP Swab in UTM/VTM, 2 HR TAT - Swab, Nasopharynx [[id number]]  (Normal) Collected: 02/16/24 1447    Lab Status: Final result Specimen: Swab from Nasopharynx Updated: 02/16/24 1622     ADENOVIRUS, PCR Not Detected     Coronavirus 229E Not Detected     Coronavirus HKU1 Not Detected     Coronavirus NL63 Not Detected     Coronavirus OC43 Not Detected     COVID19 Not Detected     Human Metapneumovirus Not Detected     Human Rhinovirus/Enterovirus Not Detected     Influenza A PCR Not Detected     Influenza B PCR Not Detected     Parainfluenza Virus 1 Not Detected     Parainfluenza Virus 2 Not Detected     Parainfluenza Virus 3 Not Detected     Parainfluenza Virus 4 Not Detected     RSV, PCR Not Detected     Bordetella pertussis pcr Not Detected     Bordetella parapertussis PCR Not Detected     Chlamydophila pneumoniae PCR Not Detected     Mycoplasma pneumo by PCR Not Detected    Narrative:      In the setting of a positive respiratory panel with a viral infection PLUS a  negative procalcitonin without other underlying concern for bacterial infection, consider observing off antibiotics or discontinuation of antibiotics and continue supportive care. If the respiratory panel is positive for atypical bacterial infection (Bordetella pertussis, Chlamydophila pneumoniae, or Mycoplasma pneumoniae), consider antibiotic de-escalation to target atypical bacterial infection.    Legionella Antigen, Urine - Urine, Urine, Clean Catch [[id number]]  (Normal) Collected: 02/16/24 1127    Lab Status: Final result Specimen: Urine, Clean Catch Updated: 02/16/24 1322     LEGIONELLA ANTIGEN, URINE Negative    S. Pneumo Ag Urine or CSF - Urine, Urine, Clean Catch [[id number]]  (Normal) Collected: 02/16/24 1127    Lab Status: Final result Specimen: Urine, Clean Catch Updated: 02/16/24 1322     Strep Pneumo Ag Negative    Body Fluid Culture - Body Fluid, Peritoneum [[id number]] Collected: 02/16/24 1030    Lab Status: Final result Specimen: Body Fluid from Peritoneum Updated: 02/19/24 0852     Body Fluid Culture No growth at 3 days     Gram Stain No WBCs seen      No organisms seen    Blood Culture - Blood, Arm, Left [[id number]]  (Abnormal) Collected: 02/16/24 0937    Lab Status: Final result Specimen: Blood from Arm, Left Updated: 02/18/24 0922     Blood Culture Escherichia coli     Isolated from Aerobic and Anaerobic Bottles     Gram Stain Anaerobic Bottle Gram negative bacilli      Aerobic Bottle Gram negative bacilli    Narrative:      Refer to previous blood culture collected on 2/16 for andreas's      COVID-19, FLU A/B, RSV PCR 1 HR TAT - Swab, Nasopharynx [[id number]]  (Normal) Collected: 02/16/24 0753    Lab Status: Final result Specimen: Swab from Nasopharynx Updated: 02/16/24 0833     COVID19 Not Detected     Influenza A PCR Not Detected     Influenza B PCR Not Detected     RSV, PCR Not Detected    Narrative:      Fact sheet for providers: https://www.fda.gov/media/459188/download    Fact sheet for patients:  https://www.fda.gov/media/887551/download    Test performed by PCR.    Blood Culture - Blood, Arm, Right [[id number]]  (Abnormal)  (Susceptibility) Collected: 02/16/24 0746    Lab Status: Final result Specimen: Blood from Arm, Right Updated: 02/18/24 0922     Blood Culture Escherichia coli     Isolated from Aerobic and Anaerobic Bottles     Gram Stain Aerobic Bottle Gram negative bacilli      Anaerobic Bottle Gram negative bacilli    Susceptibility        Escherichia coli      RITU      Amoxicillin + Clavulanate Susceptible      Ampicillin Resistant      Ampicillin + Sulbactam Resistant      Cefepime Susceptible      Ceftazidime Susceptible      Ceftriaxone Susceptible      Gentamicin Susceptible      Levofloxacin Resistant      Piperacillin + Tazobactam Susceptible      Trimethoprim + Sulfamethoxazole Susceptible                       Susceptibility Comments       Escherichia coli    Cefazolin sensitivity will not be reported for Enterobacteriaceae in non-urine isolates. If cefazolin is preferred, please call the microbiology lab to request an E-test.  With the exception of urinary-sourced infections, aminoglycosides should not be used as monotherapy.               Blood Culture ID, PCR - Blood, Arm, Right [[id number]]  (Abnormal) Collected: 02/16/24 0746    Lab Status: Final result Specimen: Blood from Arm, Right Updated: 02/16/24 2146     BCID, PCR Escherichia coli. Identification by BCID2 PCR.     BOTTLE TYPE Aerobic Bottle    Narrative:      No resistance genes detected.              [x]  Radiology CT Abdomen Pelvis With Contrast    Result Date: 2/22/2024    1. Persistent right lower lobe pneumonia within new tiny right pleural effusion. 2. No clearly acute findings in the GI tract.  No evidence of acute colitis or enteritis.  No bowel obstruction.  There is colonic diverticulosis without diverticulitis. 3. Stable nonobstructing large stone in the right renal pelvis.  No hydronephrosis on either side. 4.  Cholelithiasis without biliary obstruction. 5. Increase volume of free fluid in the abdomen and pelvis, likely dialysate. 6. Appendectomy and right hip arthroplasty.      ERIC ELLISON MD       Electronically Signed and Approved By: ERIC ELLISON MD on 2/22/2024 at 22:44             FL Esophagram w Modified Barium Swallow Single Contrast    Result Date: 2/19/2024  1. Presbyesophagus 2. Prominent cricopharyngeal bar 3. Laryngeal penetration and silent tracheal aspiration with thin liquid barium     HUYEN SANDHU       Electronically Signed and Approved By: Angel Luis Barrientos M.D. on 2/19/2024 at 17:24             CT Abdomen Pelvis Without Contrast    Result Date: 2/16/2024    1. Extensive right lower lobe pneumonia. 2. Pneumoperitoneum similar to prior chest CT likely related to peritoneal dialysis catheter.  No organized fluid collection or abscess. 3. Nonobstructing 11 mm calculus at the right renal pelvis unchanged. 4. Additional chronic findings above.     LISY SANTILLAN MD       Electronically Signed and Approved By: LISY SANTILLAN MD on 2/16/2024 at 14:46             XR Chest 1 View    Result Date: 2/16/2024   Right lower lobe airspace opacity consistent with pneumonia.  Pneumoperitoneum, better seen on same day CT of the abdomen/pelvis.       JOSH HAMPTON MD       Electronically Signed and Approved By: JOSH HAMPTON MD on 2/16/2024 at 14:30             XR Chest 1 View    Result Date: 2/16/2024    No acute process.       LISY SANTILLAN MD       Electronically Signed and Approved By: LISY SANTILLAN MD on 2/16/2024 at 8:07                [x]  EKG/Telemetry   No orders to display       [x]  Cardiology/Vascular   []  Pathology  [x]  Old records  []  Other:    Assessment & Plan   Assessment / Plan     Assessment/Plan:    Assessment:  Septic shock due to E. coli bacteremia  E. coli bacteremia  Right lower lobe pneumonia likely due to E. coli likely due to silent aspiration  Psoriasis on chronic immunosuppression  C. difficile  colitis and diarrhea  Acute peritonitis  Anion gap metabolic acidosis  Lactic acidosis  ESRD on PD  Hypokalemia  Diabetes mellitus with insulin use  Essential hypertension  Dyslipidemia  Hypothyroidism  Hyperuricemia/gout  Anemia of chronic kidney disease  Thrombocytopenia    Plan:  Labs and imaging reviewed  CT abdomen and pelvis reviewed  Admit to chair  Start guaifenesin 600 mg twice a day  Prescription written for ceftriaxone, working with  to get it approved  Midline care  Discussed with wound care, changes to wound care regimen to help with excoriated skin perianally  Continue vancomycin oral 125 mg every 6 hours  GI consulted Dr. Marroquin, recommendations appreciated; continued rifaximin  Continue levothyroxine 125 mcg daily  Renal diet, nectar thick liquids  Patient assumes responsibility and risk of aspiration   Zofran for nausea  Melatonin at night to help with sleep  Prophylactic nystatin swish and swallow  Sodium bicarb tabs 1300 mg twice a day continued  Dialysis fluid exchange per nephrology  A.m. labs  Full code  PT/OT  DVT prophylaxis with heparin  Discussed with nurse at the bedside      DVT prophylaxis:  Medical and mechanical DVT prophylaxis orders are present.        CODE STATUS:   Level Of Support Discussed With: Patient  Code Status (Patient has no pulse and is not breathing): CPR (Attempt to Resuscitate)  Medical Interventions (Patient has pulse or is breathing): Full Support    Electronically signed by Saúl Vargas MD, 02/23/24, 1:59 PM EST.  Portions of this documentation were transcribed electronically from a voice recognition software.  I confirm all data accurately represents the service(s) I performed at today's visit.

## 2024-02-23 NOTE — PLAN OF CARE
Goal Outcome Evaluation:      Patient having CT of the abdomen with IV and Oral contrast. MD Vargas okay with the patient drinking oral contrast as is in thin liquid form as it cannont be thickened per pharmacy. MD Dr Castro also aware the patient is to receive oral and IV contrast due to kidney disease process and needing PD exchanges. Patient is pleasant, sometimes forgetfull. No complaints of pain since this RN took over for Reagan Green RN. VSS

## 2024-02-23 NOTE — CONSULTS
Discharge Planning Assessment  BRIA Sullivan     Patient Name: Nelson Wang  MRN: [id number]  Today's Date: 2/23/2024    Admit Date: 2/16/2024     Discharge Plan       Row Name 02/23/24 1037       Plan    Plan Patient is agreeable to cost per [facility]. [facility] and [facility] are able to start care for patient tomorrow and are able to have medications delivered tomorrow by noon. Will update MD and discuss disposition.          Continued Care and Services - Admitted Since 2/16/2024       Home Medical Care       Service Provider Request Status Selected Services Address Phone Fax Patient Preferred    [facility] [id number] [address] [phone] --                RACHELLE Perez

## 2024-02-23 NOTE — PROGRESS NOTES
[facility]     Nephrology Progress Note      Patient Name: Nelson Wang  : 1945  MRN: [id number]  Primary Care Physician:  Janna Mo MD  Date of admission: 2024    Subjective   Subjective     Interval History:  Patient Reports feeling somewhat better.  No trouble with PD.  Diarrhea improved.  Tolerating some p.o. no shortness of breath or chest pain.  No evidence of bleeding.    Review of Systems   All systems were reviewed and negative except for: What is mentioned above.    Objective   Objective     Vitals:   Temp:  [99.1 °F (37.3 °C)-99.9 °F (37.7 °C)] 99.1 °F (37.3 °C)  Heart Rate:  [76-84] 77  Resp:  [18-20] 18  BP: (118-142)/(68-74) 136/70  Physical Exam:   Constitutional: Awake, alert, no distress, conversant and pleasant.   Eyes: sclerae anicteric, no conjunctival injection, some pallor noted.   HENT: mucous membranes moist   Neck: Supple, no thyromegaly, no lymphadenopathy, trachea midline, No JVD   Respiratory: Decreased to auscultation bilaterally, nonlabored respirations    Cardiovascular: RRR, no murmurs, rubs, or gallops.   Gastrointestinal: Positive bowel sounds, soft, nontender, nondistended, site of PD catheter without drainage, erythema or tenderness.   Musculoskeletal: No edema, no clubbing or cyanosis   Psychiatric: Appropriate affect, cooperative   Neurologic: Oriented x 3, moving all extremities, Cranial Nerves grossly intact, speech clear   Skin: warm and dry, no rashes, + vitiligo.    Result Review    Result Reviewed:  I have personally reviewed the results from the time of this admission to 2024 15:19 EST and agree with these findings:  [x]  Laboratory  []  Microbiology  [x]  Radiology  []  EKG/Telemetry   []  Cardiology/Vascular   []  Pathology  []  Old records  []  Other:        Lab 24  0342 24  0630 24  0451 24  1931 24  0518 24  0433 24  0528 24  0230   SODIUM 136 137 137 136 137 136 135* 136   POTASSIUM  3.9 3.6 3.3* 3.0* 3.1* 3.3* 3.2* 3.7   CHLORIDE 99 101 101 100 101 99 97* 99   CO2 24.3 23.1 22.0 22.9 22.5 21.5* 22.6 19.6*   BUN 28* 31* 32* 34* 36* 42* 43* 44*   CREATININE 4.92* 4.96* 4.87* 5.02* 5.24* 5.54* 5.85* 6.62*   GLUCOSE 205* 133* 129* 150* 124* 115* 78 162*   EGFR 11.4* 11.3* 11.5* 11.1* 10.6* 9.9* 9.2* 8.0*   ANION GAP 12.7 12.9 14.0 13.1 13.5 15.5* 15.4* 17.4*   MAGNESIUM 1.8 2.1 1.9 2.2 2.3 2.6* 3.0* 1.1*   PHOSPHORUS 3.8 4.0 2.8 3.0 3.2 3.4 3.7 5.3*             Assessment & Plan   Assessment / Plan       Active Hospital Problems:  Active Hospital Problems    Diagnosis     **Peritonitis        Assessment and Plan:    - ESRD, on peritoneal dialysis.  Volume status is adequate.  Electrolytes are as noted above.  Continue 1.5 L, 1.5% dextrose solution 4 times a day.  Monitor closely.  continue nystatin swish and swallow while on antibiotics for fungal peritonitis prevention.  - Severe, watery diarrhea, being evaluated by GI.  Better with octreotide.  On IV antibiotics.  - E. coli sepsis, source is unclear to me, possibly related to GI translocation or pneumonia.  On therapy.  No evidence of peritonitis.  - Hypokalemia, being replaced.  Liberalize diet.  Add protein supplements/shakes if able to tolerate.  Magnesium okay.    - Anemia of chronic kidney disease, was on long-acting RONALD as outpatient.  Last dose of Mircera 12/5/2023.  Hemoglobin is lower.  Iron studies are adequate.  High ferritin.  Will give Epogen 30,000 units subcu x 1.    - Thrombocytopenia, slightly better, no bleeding, monitor.  - Hypothyroidism, on therapy, per primary.  - Metabolic acidosis, on p.o. bicarb.  Improved.  - Right lower lobe pneumonia, per primary.  Discussed with daughter Jose.    Will follow.

## 2024-02-23 NOTE — PLAN OF CARE
Goal Outcome Evaluation:     AOX4 with forgetfulness. Medicated x1 for c/o pain. X4 BMs noted this shift. Wound and skin care provided per orders. PD completed per orders. Up with standby assistance. Fall risk measures in place. Continuing with nectar thick liquids, SLP reconsulted per patient request.

## 2024-02-24 ENCOUNTER — READMISSION MANAGEMENT (OUTPATIENT)
Dept: CALL CENTER | Facility: HOSPITAL | Age: 79
End: 2024-02-24
Payer: MEDICARE

## 2024-02-24 VITALS
OXYGEN SATURATION: 91 % | SYSTOLIC BLOOD PRESSURE: 129 MMHG | RESPIRATION RATE: 20 BRPM | TEMPERATURE: 98.2 F | HEART RATE: 74 BPM | WEIGHT: 172.4 LBS | DIASTOLIC BLOOD PRESSURE: 91 MMHG | HEIGHT: 66 IN | BODY MASS INDEX: 27.71 KG/M2

## 2024-02-24 LAB
ALBUMIN SERPL-MCNC: 2.4 G/DL (ref 3.5–5.2)
ALP SERPL-CCNC: 53 U/L (ref 39–117)
ALT SERPL W P-5'-P-CCNC: 14 U/L (ref 1–41)
ANION GAP SERPL CALCULATED.3IONS-SCNC: 10.5 MMOL/L (ref 5–15)
ANISOCYTOSIS BLD QL: NORMAL
AST SERPL-CCNC: 23 U/L (ref 1–40)
BASOPHILS # BLD AUTO: 0.05 10*3/MM3 (ref 0–0.2)
BASOPHILS NFR BLD AUTO: 0.6 % (ref 0–1.5)
BILIRUB CONJ SERPL-MCNC: <0.2 MG/DL (ref 0–0.3)
BILIRUB INDIRECT SERPL-MCNC: ABNORMAL MG/DL
BILIRUB SERPL-MCNC: 0.2 MG/DL (ref 0–1.2)
BUN SERPL-MCNC: 28 MG/DL (ref 8–23)
BUN/CREAT SERPL: 5.4 (ref 7–25)
CALCIUM SPEC-SCNC: 8.7 MG/DL (ref 8.6–10.5)
CHLORIDE SERPL-SCNC: 102 MMOL/L (ref 98–107)
CO2 SERPL-SCNC: 25.5 MMOL/L (ref 22–29)
CREAT SERPL-MCNC: 5.19 MG/DL (ref 0.76–1.27)
DEPRECATED RDW RBC AUTO: 56.3 FL (ref 37–54)
EGFRCR SERPLBLD CKD-EPI 2021: 10.7 ML/MIN/1.73
EOSINOPHIL # BLD AUTO: 0.38 10*3/MM3 (ref 0–0.4)
EOSINOPHIL NFR BLD AUTO: 4.3 % (ref 0.3–6.2)
ERYTHROCYTE [DISTWIDTH] IN BLOOD BY AUTOMATED COUNT: 16.3 % (ref 12.3–15.4)
GLUCOSE SERPL-MCNC: 192 MG/DL (ref 65–99)
HCT VFR BLD AUTO: 27.8 % (ref 37.5–51)
HGB BLD-MCNC: 8.7 G/DL (ref 13–17.7)
IMM GRANULOCYTES # BLD AUTO: 0.78 10*3/MM3 (ref 0–0.05)
IMM GRANULOCYTES NFR BLD AUTO: 8.9 % (ref 0–0.5)
LARGE PLATELETS: NORMAL
LYMPHOCYTES # BLD AUTO: 1.78 10*3/MM3 (ref 0.7–3.1)
LYMPHOCYTES NFR BLD AUTO: 20.4 % (ref 19.6–45.3)
MAGNESIUM SERPL-MCNC: 1.7 MG/DL (ref 1.6–2.4)
MCH RBC QN AUTO: 30.5 PG (ref 26.6–33)
MCHC RBC AUTO-ENTMCNC: 31.3 G/DL (ref 31.5–35.7)
MCV RBC AUTO: 97.5 FL (ref 79–97)
MONOCYTES # BLD AUTO: 0.69 10*3/MM3 (ref 0.1–0.9)
MONOCYTES NFR BLD AUTO: 7.9 % (ref 5–12)
NEUTROPHILS NFR BLD AUTO: 5.06 10*3/MM3 (ref 1.7–7)
NEUTROPHILS NFR BLD AUTO: 57.9 % (ref 42.7–76)
NRBC BLD AUTO-RTO: 0.5 /100 WBC (ref 0–0.2)
PHOSPHATE SERPL-MCNC: 4.2 MG/DL (ref 2.5–4.5)
PLATELET # BLD AUTO: 155 10*3/MM3 (ref 140–450)
PMV BLD AUTO: 10.6 FL (ref 6–12)
POTASSIUM SERPL-SCNC: 3.5 MMOL/L (ref 3.5–5.2)
PROT SERPL-MCNC: 5.7 G/DL (ref 6–8.5)
RBC # BLD AUTO: 2.85 10*6/MM3 (ref 4.14–5.8)
SODIUM SERPL-SCNC: 138 MMOL/L (ref 136–145)
WBC MORPH BLD: NORMAL
WBC NRBC COR # BLD AUTO: 8.74 10*3/MM3 (ref 3.4–10.8)

## 2024-02-24 PROCEDURE — 80076 HEPATIC FUNCTION PANEL: CPT | Performed by: FAMILY MEDICINE

## 2024-02-24 PROCEDURE — 25010000002 HEPARIN (PORCINE) PER 1000 UNITS: Performed by: INTERNAL MEDICINE

## 2024-02-24 PROCEDURE — 85025 COMPLETE CBC W/AUTO DIFF WBC: CPT | Performed by: FAMILY MEDICINE

## 2024-02-24 PROCEDURE — 80048 BASIC METABOLIC PNL TOTAL CA: CPT | Performed by: FAMILY MEDICINE

## 2024-02-24 PROCEDURE — 84100 ASSAY OF PHOSPHORUS: CPT | Performed by: FAMILY MEDICINE

## 2024-02-24 PROCEDURE — 85007 BL SMEAR W/DIFF WBC COUNT: CPT | Performed by: FAMILY MEDICINE

## 2024-02-24 PROCEDURE — 25010000002 CEFTRIAXONE PER 250 MG: Performed by: FAMILY MEDICINE

## 2024-02-24 PROCEDURE — 83735 ASSAY OF MAGNESIUM: CPT | Performed by: FAMILY MEDICINE

## 2024-02-24 PROCEDURE — 25010000002 OCTREOTIDE PER 25 MCG: Performed by: INTERNAL MEDICINE

## 2024-02-24 PROCEDURE — 99239 HOSP IP/OBS DSCHRG MGMT >30: CPT | Performed by: FAMILY MEDICINE

## 2024-02-24 RX ORDER — VANCOMYCIN HYDROCHLORIDE 125 MG/1
125 CAPSULE ORAL EVERY 6 HOURS SCHEDULED
Qty: 12 CAPSULE | Refills: 0 | Status: SHIPPED | OUTPATIENT
Start: 2024-02-24 | End: 2024-02-29

## 2024-02-24 RX ORDER — HYDROXYZINE PAMOATE 25 MG/1
25 CAPSULE ORAL ONCE AS NEEDED
Status: COMPLETED | OUTPATIENT
Start: 2024-02-24 | End: 2024-02-24

## 2024-02-24 RX ORDER — OCTREOTIDE ACETATE 100 UG/ML
200 INJECTION, SOLUTION INTRAVENOUS; SUBCUTANEOUS EVERY 8 HOURS SCHEDULED
Status: DISCONTINUED | OUTPATIENT
Start: 2024-02-24 | End: 2024-02-24 | Stop reason: HOSPADM

## 2024-02-24 RX ORDER — LIDOCAINE 5 G/100G
1 CREAM RECTAL; TOPICAL 3 TIMES DAILY PRN
Qty: 38 G | Refills: 0 | Status: ON HOLD | OUTPATIENT
Start: 2024-02-24

## 2024-02-24 RX ORDER — TORSEMIDE 20 MG/1
40 TABLET ORAL EVERY MORNING
Status: ON HOLD
Start: 2024-03-01

## 2024-02-24 RX ORDER — SODIUM BICARBONATE 650 MG/1
1300 TABLET ORAL 2 TIMES DAILY
Qty: 120 TABLET | Refills: 0 | Status: ON HOLD | OUTPATIENT
Start: 2024-02-24 | End: 2024-03-25

## 2024-02-24 RX ORDER — CARVEDILOL 12.5 MG/1
12.5 TABLET ORAL 2 TIMES DAILY
Qty: 60 TABLET | Refills: 0 | Status: ON HOLD | OUTPATIENT
Start: 2024-02-24 | End: 2024-03-25

## 2024-02-24 RX ADMIN — RIFAXIMIN 550 MG: 550 TABLET ORAL at 06:19

## 2024-02-24 RX ADMIN — SODIUM CHLORIDE, SODIUM LACTATE, CALCIUM CHLORIDE, MAGNESIUM CHLORIDE AND DEXTROSE 1500 ML: 1.5; 538; 448; 18.3; 5.08 INJECTION, SOLUTION INTRAPERITONEAL at 12:49

## 2024-02-24 RX ADMIN — VANCOMYCIN HYDROCHLORIDE 125 MG: 125 CAPSULE ORAL at 12:49

## 2024-02-24 RX ADMIN — GUAIFENESIN 600 MG: 600 TABLET ORAL at 08:13

## 2024-02-24 RX ADMIN — RIFAXIMIN 550 MG: 550 TABLET ORAL at 14:19

## 2024-02-24 RX ADMIN — Medication 10 ML: at 08:14

## 2024-02-24 RX ADMIN — CEFTRIAXONE SODIUM 2000 MG: 2 INJECTION, POWDER, FOR SOLUTION INTRAMUSCULAR; INTRAVENOUS at 14:20

## 2024-02-24 RX ADMIN — NYSTATIN 500000 UNITS: 100000 SUSPENSION ORAL at 12:49

## 2024-02-24 RX ADMIN — SODIUM BICARBONATE 650 MG TABLET 1300 MG: at 08:13

## 2024-02-24 RX ADMIN — LEVOTHYROXINE SODIUM 125 MCG: 125 TABLET ORAL at 06:19

## 2024-02-24 RX ADMIN — VANCOMYCIN HYDROCHLORIDE 125 MG: 125 CAPSULE ORAL at 06:19

## 2024-02-24 RX ADMIN — POTASSIUM CHLORIDE 20 MEQ: 10 CAPSULE, COATED, EXTENDED RELEASE ORAL at 08:13

## 2024-02-24 RX ADMIN — OCTREOTIDE ACETATE 200 MCG: 100 INJECTION, SOLUTION INTRAVENOUS; SUBCUTANEOUS at 14:20

## 2024-02-24 RX ADMIN — HEPARIN SODIUM 5000 UNITS: 5000 INJECTION INTRAVENOUS; SUBCUTANEOUS at 08:13

## 2024-02-24 RX ADMIN — VANCOMYCIN HYDROCHLORIDE 125 MG: 125 CAPSULE ORAL at 00:57

## 2024-02-24 RX ADMIN — OCTREOTIDE ACETATE 200 MCG: 100 INJECTION, SOLUTION INTRAVENOUS; SUBCUTANEOUS at 06:19

## 2024-02-24 RX ADMIN — SODIUM CHLORIDE, SODIUM LACTATE, CALCIUM CHLORIDE, MAGNESIUM CHLORIDE AND DEXTROSE 1500 ML: 1.5; 538; 448; 18.3; 5.08 INJECTION, SOLUTION INTRAPERITONEAL at 08:15

## 2024-02-24 RX ADMIN — HYDROXYZINE PAMOATE 25 MG: 25 CAPSULE ORAL at 03:18

## 2024-02-24 RX ADMIN — NYSTATIN 500000 UNITS: 100000 SUSPENSION ORAL at 08:13

## 2024-02-24 NOTE — PROGRESS NOTES
[facility]     Nephrology Progress Note      Patient Name: Nelson Wang  : 1945  MRN: [id number]  Primary Care Physician:  Janna Mo MD  Date of admission: 2024    Subjective   Subjective     Interval History:  Patient Reports feeling somewhat better.  No trouble with PD.  Diarrhea improved.  Tolerating some p.o. no shortness of breath or chest pain.      Review of Systems   All systems were reviewed and negative except for: What is mentioned above.    Objective   Objective     Vitals:   Temp:  [98.1 °F (36.7 °C)-99.1 °F (37.3 °C)] 98.6 °F (37 °C)  Heart Rate:  [71-79] 77  Resp:  [16-18] 18  BP: (124-148)/(68-88) 145/88  Physical Exam:   Constitutional: Awake, alert, no distress, conversant and pleasant.   Eyes: sclerae anicteric, no conjunctival injection, some pallor noted.   HENT: mucous membranes moist   Neck: Supple, no thyromegaly, no lymphadenopathy, trachea midline, No JVD   Respiratory: Decreased to auscultation bilaterally, nonlabored respirations    Cardiovascular: RRR, no murmurs, rubs, or gallops.   Gastrointestinal: Positive bowel sounds, soft, nontender, nondistended, site of PD catheter without drainage, erythema or tenderness.   Musculoskeletal: No edema, no clubbing or cyanosis   Psychiatric: Appropriate affect, cooperative   Neurologic: Oriented x 3, moving all extremities, Cranial Nerves grossly intact, speech clear   Skin: warm and dry, no rashes, + vitiligo.    Result Review    Result Reviewed:  I have personally reviewed the results from the time of this admission to 2024 09:19 EST and agree with these findings:  [x]  Laboratory  []  Microbiology  [x]  Radiology  []  EKG/Telemetry   []  Cardiology/Vascular   []  Pathology  []  Old records  []  Other:        Lab 24  0634 24  1842 24  0342 24  0630 24  0451 24  1931 24  0518 24  0433 24  0528   SODIUM 138 139 136 137 137 136 137 136 135*   POTASSIUM 3.5 3.5  3.9 3.6 3.3* 3.0* 3.1* 3.3* 3.2*   CHLORIDE 102 102 99 101 101 100 101 99 97*   CO2 25.5 25.3 24.3 23.1 22.0 22.9 22.5 21.5* 22.6   BUN 28* 30* 28* 31* 32* 34* 36* 42* 43*   CREATININE 5.19* 4.82* 4.92* 4.96* 4.87* 5.02* 5.24* 5.54* 5.85*   GLUCOSE 192* 187* 205* 133* 129* 150* 124* 115* 78   EGFR 10.7* 11.7* 11.4* 11.3* 11.5* 11.1* 10.6* 9.9* 9.2*   ANION GAP 10.5 11.7 12.7 12.9 14.0 13.1 13.5 15.5* 15.4*   MAGNESIUM 1.7  --  1.8 2.1 1.9 2.2 2.3 2.6* 3.0*   PHOSPHORUS 4.2  --  3.8 4.0 2.8 3.0 3.2 3.4 3.7             Assessment & Plan   Assessment / Plan       Active Hospital Problems:  Active Hospital Problems    Diagnosis     **Peritonitis        Assessment and Plan:    - ESRD, on peritoneal dialysis.  Volume status is adequate.  Electrolytes are as noted above.  Continue 1.5 L, 1.5% dextrose solution 4 times a day.  Monitor closely.  continue nystatin swish and swallow while on antibiotics for fungal peritonitis prevention.  OK for d/c from renal standpoint.  - Severe, watery diarrhea, being evaluated by GI.  Better with octreotide.  On IV antibiotics.  - E. coli sepsis, source is unclear to me, possibly related to GI translocation or pneumonia.  On therapy.  No evidence of peritonitis.  - Hypokalemia, being replaced.  Liberalize diet.  Add protein supplements/shakes if able to tolerate.  Magnesium okay.    - Anemia of chronic kidney disease, was on long-acting RONALD as outpatient.  Last dose of Mircera 12/5/2023.  Hemoglobin is lower.  Iron studies are adequate.  High ferritin.  S/p Epogen 30,000 units subcu x 1.    - Thrombocytopenia, slightly better, no bleeding, monitor.  - Hypothyroidism, on therapy, per primary.  - Metabolic acidosis, on p.o. bicarb.  Improved.  - Right lower lobe pneumonia, per primary.

## 2024-02-24 NOTE — DISCHARGE SUMMARY
[facility]         HOSPITALIST  DISCHARGE SUMMARY    Patient Name: Nelson Wang  : 1945  MRN: [id number]    Date of Admission: 2024  Date of Discharge:  2024    Primary Care Physician: Janna Mo MD    Consults       Date and Time Order Name Status Description    2024  1:52 PM Inpatient Gastroenterology Consult      2024 12:14 PM Inpatient Nephrology Consult      2024 10:10 AM Hospitalist (on-call MD unless specified)              Active and Resolved Hospital Problems:  Septic shock due to E. coli bacteremia  E. coli bacteremia  Right lower lobe pneumonia likely due to E. coli likely due to silent aspiration  Psoriasis on chronic immunosuppression  C. difficile colitis and diarrhea  Acute peritonitis  Anion gap metabolic acidosis  Lactic acidosis  ESRD on PD  Hypokalemia  Diabetes mellitus with insulin use  Essential hypertension  Dyslipidemia  Hypothyroidism  Hyperuricemia/gout  Anemia of chronic kidney disease  Thrombocytopenia    Active Hospital Problems    Diagnosis POA    **Peritonitis [K65.9] Yes      Resolved Hospital Problems   No resolved problems to display.       Hospital Course     Hospital Course:  79-year-old male with history of ESRD on peritoneal dialysis, hypertension, diabetes mellitus, dyslipidemia, hypothyroidism, hyperuricemia/gout, vitiligo, immunosuppressed on Skyrizi, hospitalized on 2024 with chief complaint of fevers and weakness, found to be in septic shock due to E. coli bacteremia and right lower lobe pneumonia, with C. difficile positive stools, with concern for acute peritonitis, placed on broad-spectrum antibiotics, nephrology and critical care consulted, required Levophed, central line placed, Levophed weaned, transferred out of the ICU after starting midodrine, C. difficile positive stools, vancomycin orally started, GI consulted with persistent ongoing diarrhea.  Speech therapy consulted with concern for  aspiration, modified barium swallow indicated laryngeal penetration and silent aspiration with thin liquids, recommended nectar thick solids, no straws.  Midline placed.  Arranged outpatient antibiotics.  Per GI request, Depo shot of Sandostatin ordered monthly.  To complete antibiotics via IV via midline.  Cleared for discharge by nephrology on 2/24/2024.  Discharged in hemodynamically stable condition on 2/24/2024.  To follow-up with GI as outpatient.  To follow-up with pulmonary as outpatient.  Patient assumes risk of aspiration, understands pneumonias can recur with aspiration.  Home health arranged at time of discharge.        Day of Discharge     Vital Signs:  Temp:  [98.1 °F (36.7 °C)-99 °F (37.2 °C)] 98.2 °F (36.8 °C)  Heart Rate:  [74-79] 74  Resp:  [16-20] 20  BP: (129-153)/(68-96) 129/91  Review of systems:  All systems reviewed negative except for less diarrhea, weakness, generalized fatigue    Physical Exam              Constitutional: Awake, alert, no acute distress sitting upright    Eyes: Pupils equal, sclerae anicteric, no conjunctival injection   HENT: NCAT, mucous membranes moist   Neck: Supple, full range of motion  Respiratory: Diminished coarse breath sounds mostly on the right side lower lobe   Cardiovascular: RRR, no murmurs, rubs, or gallops, palpable pedal pulses bilaterally   Gastrointestinal: Positive bowel sounds, soft, nontender, distended, PD catheter in place while going fluid exchange   Musculoskeletal: No bilateral ankle edema, no clubbing or cyanosis to extremities   Psychiatric: Appropriate affect, cooperative   Neurologic: Oriented x 3, strength symmetric in all extremities, Cranial Nerves grossly intact to confrontation, speech clear   Skin: No rashes visible on exposed skin; rectal exam deferred         Discharge Details        Discharge Medications        New Medications        Instructions Start Date   cefTRIAXone 1 g injection  Commonly known as: ROCEPHIN   2 g, Intravenous,  Every 24 Hours      Lidocaine (Anorectal) 5 % cream cream  Commonly known as: LMX 5   1 Application, Topical, 3 Times Daily PRN      nystatin 100,000 unit/mL suspension  Commonly known as: MYCOSTATIN   500,000 Units, Swish & Swallow, 4 Times Daily      octreotide 30 MG injection  Commonly known as: sandoSTATIN   30 mg, Intramuscular, Every 28 Days      riFAXIMin 550 MG tablet  Commonly known as: XIFAXAN   550 mg, Oral, Every 8 Hours Scheduled      sodium bicarbonate 650 MG tablet   1,300 mg, Oral, 2 Times Daily      vancomycin 125 MG capsule  Commonly known as: VANCOCIN   125 mg, Oral, Every 6 Hours Scheduled             Changes to Medications        Instructions Start Date   carvedilol 12.5 MG tablet  Commonly known as: COREG  What changed: how much to take   12.5 mg, Oral, 2 Times Daily      torsemide 20 MG tablet  Commonly known as: DEMADEX  What changed:   additional instructions  These instructions start on March 1, 2024. If you are unsure what to do until then, ask your doctor or other care provider.   40 mg, Oral, Every Morning, Resume use of Torsemide after diarrhea subsides   Start Date: March 1, 2024            Continue These Medications        Instructions Start Date   allopurinol 300 MG tablet  Commonly known as: ZYLOPRIM   300 mg, Oral, Daily      ascorbic acid 1000 MG tablet  Commonly known as: VITAMIN C   1,000 mg, Oral, Daily      atorvastatin 40 MG tablet  Commonly known as: LIPITOR   40 mg, Oral, Daily      fish oil 1000 MG capsule capsule   2,000 mg, Oral, Daily With Breakfast      Insulin Glargine 100 UNIT/ML injection pen  Commonly known as: LANTUS SOLOSTAR   15-20 Units, Subcutaneous, Nightly      levothyroxine 125 MCG tablet  Commonly known as: SYNTHROID, LEVOTHROID   125 mcg, Oral, Daily      sertraline 100 MG tablet  Commonly known as: ZOLOFT   100 mg, Oral, Nightly      sevelamer 800 MG tablet  Commonly known as: RENVELA   2 tablets, Oral, 2 Times Daily With Meals      SKYRIZI (150 MG DOSE)  SC   Subcutaneous, Once every 3 months       Turmeric 500 MG capsule   1 capsule, Oral, 2 Times Daily               No Known Allergies    Discharge Disposition:  Home-Health Care Svc    Diet:  Hospital:  Diet Order   Procedures    Diet: Renal Diets; Low Sodium (2-3g); No Straw; Texture: Soft to Chew (NDD 3); Soft to Chew: Whole Meat; Fluid Consistency: Thin (IDDSI 0)       Discharge Activity:       CODE STATUS:  Code Status and Medical Interventions:   Ordered at: 02/19/24 0659     Level Of Support Discussed With:    Patient     Code Status (Patient has no pulse and is not breathing):    CPR (Attempt to Resuscitate)     Medical Interventions (Patient has pulse or is breathing):    Full Support         Future Appointments   Date Time Provider Department Center   4/10/2024 10:45 AM Armando Guallpa DPM [facility]       Additional Instructions for the Follow-ups that You Need to Schedule       Discharge Follow-up with PCP   As directed       Currently Documented PCP:    Janna Mo MD    PCP Phone Number:    [phone]     Follow Up Details: 3 to 7 days                Pertinent  and/or Most Recent Results     PROCEDURES:   CT Abdomen Pelvis With Contrast    Result Date: 2/22/2024  PROCEDURE: CT ABDOMEN PELVIS W CONTRAST  COMPARISON: [facility], CT, CT ABDOMEN PELVIS WO CONTRAST, 2/16/2024, 14:16.  INDICATIONS: ABDOMINAL PAIN, DIARRHEA  TECHNIQUE: After obtaining the patient's consent, CT images were created with non-ionic intravenous contrast material.   PROTOCOL:   Standard imaging protocol performed    RADIATION:   DLP: 769 mGy*cm   Automated exposure control was utilized to minimize radiation dose. CONTRAST: 100cc Isovue 370 I.V. oral contrast administered as well.  FINDINGS:  There is continued right lower lobe pneumonia.  There is a new tiny right pleural effusion.  There is bilateral gynecomastia.  There is coronary artery disease in addition to diffuse atherosclerotic  disease.  No aortic aneurysm.  Gallstones are present in the gallbladder.  No biliary obstruction.  There is bilateral renal cortical thinning, and there are bilateral renal cysts.  No hydronephrosis is identified.  A large stone is again seen in the right renal pelvis measuring about 12 mm in length, unchanged.  The solid abdominal organs are otherwise within normal limits.  Urinary bladder is not well distended but is grossly normal.  Prostate unremarkable.  Right hip arthroplasty is present.  Peritoneal dialysis catheter is coiled in the pelvis.  Free fluid throughout the abdomen and pelvis is presumed dialysate.  Volume has increased since the prior study.  A few scattered bubbles of free intraperitoneal air are also presumed related to the dialysis catheter.  There is a small umbilical hernia containing some fluid.  The appendix is surgically absent.  There is colonic diverticulosis without definite focal diverticulitis.  No definitive evidence of acute colitis.  No small bowel obstruction or evidence of enteritis.  The stomach is normal.  No adenopathy is identified.  Degenerative change in the lumbar spine and visualized thoracic spine is stable.  Chronic compression fracture at L3 is unchanged.        1. Persistent right lower lobe pneumonia within new tiny right pleural effusion. 2. No clearly acute findings in the GI tract.  No evidence of acute colitis or enteritis.  No bowel obstruction.  There is colonic diverticulosis without diverticulitis. 3. Stable nonobstructing large stone in the right renal pelvis.  No hydronephrosis on either side. 4. Cholelithiasis without biliary obstruction. 5. Increase volume of free fluid in the abdomen and pelvis, likely dialysate. 6. Appendectomy and right hip arthroplasty.      ERIC ELLISON MD       Electronically Signed and Approved By: ERIC ELLISON MD on 2/22/2024 at 22:44             FL Esophagram w Modified Barium Swallow Single Contrast    Result Date:  "2/19/2024  PROCEDURE: FL ESOPHOGRAM COMP WITH MODIFIED BARIUM SWALLOW SINGLE CONTRAST  COMPARISON:  None  INDICATIONS: RLL pna, rule out aspiration/ 124.9 mGy/ 3.3 min fluoro time/ 20 images  FINDINGS:  Under fluoroscopic observation, a double-contrast esophagram was performed. Images of the thoracic esophagus were obtained.  The thoracic esophagus appeared of normal contour and caliber.  The thoracic esophageal mucosa appeared without evidence of mass, narrowing, inflammatory or ulcerative change.  No hiatal hernia witnessed.  There is proximal escape of the primary swallowed bolus with non-peristaltic tertiary contractions consistent with presbyesophagus.  At times, this resulted in poor clearance of contrast from the esophagus into the stomach resulting in a \"to and fro\" motion that patient was able to appreciate during exam.  This more so occurred when patient was in prone HILL position.  No witnessed spontaneous or provoked gastroesophageal reflux on exam.  Prominent cricopharyngeal bar appreciated on exam when in prone HILL position, which produces an AP diameter narrowing of approximately 50%.  Fluoroscopic examination was performed in conjunction with speech pathology department.  Various consistencies of barium were given to assess the swallow mechanism.  Patient experienced laryngeal penetration and silent tracheal aspiration with thin liquid barium.  The exam was discussed in real-time by myself and the speech pathologist. Please consult speech pathology report for further details regarding exam.      1. Presbyesophagus 2. Prominent cricopharyngeal bar 3. Laryngeal penetration and silent tracheal aspiration with thin liquid barium     HUYEN SANDHU       Electronically Signed and Approved By: Angel Luis Barrientos M.D. on 2/19/2024 at 17:24             Adult Transthoracic Echo Complete W/ Cont if Necessary Per Protocol    Result Date: 2/19/2024    Left ventricular systolic function is normal. Calculated left " ventricular EF = 56.6%   Left ventricular wall thickness is consistent with mild concentric hypertrophy.   Left ventricular diastolic function is consistent with (grade I) impaired relaxation and age.   Aortic valve sclerosis with minimal aortic valve stenosis is present.     CT Chest Without Contrast Diagnostic    Result Date: 2/18/2024  PROCEDURE: CT CHEST WO CONTRAST DIAGNOSTIC  COMPARISON: [facility], CT, CT CHEST WO CONTRAST DIAGNOSTIC, 12/06/2023, 11:11.  [facility], CT, CT ABDOMEN PELVIS WO CONTRAST, 2/16/2024, 14:16.  INDICATIONS: SHORTNESS OF BREATH  PROTOCOL:   Standard imaging protocol performed    RADIATION:   DLP: 313 mGy*cm   Automated exposure control was utilized to minimize radiation dose.  TECHNIQUE: Axial images of the chest without intravenous contrast.  FINDINGS: Slight improvement in right lower lobe airspace consolidation.  Areas of ground-glass opacity in the right mid and lower lung field appear stable.  No cavitary lesions are identified.  Stable pneumoperitoneum and abdominal free fluid likely related to peritoneal dialysis.  Coronary artery calcification is present.  The thoracic aorta has a normal caliber.  There is no mediastinal, axillary or hilar adenopathy.  Right IJ central line tip is in the SVC.  There is mild gynecomastia.  No evidence of pneumothorax.  No evidence of significant pleural or pericardial effusion.  Degenerative changes are present in the thoracic spine.  IMPRESSION:  Slight improvement in the right lower lobe airspace consolidation.  EPIFANIO RENTERIA MD       Electronically Signed and Approved By: EPIFANIO RENTERIA MD on 2/18/2024 at 10:04             XR Chest 1 View    Result Date: 2/16/2024  PROCEDURE: XR CHEST 1 VW  COMPARISON: [facility], CT, CT ABDOMEN PELVIS WO CONTRAST, 2/16/2024, 14:16.  [facility], CR, XR CHEST 1 VW, 2/16/2024, 13:50.  INDICATIONS: central line placement  FINDINGS:   New right  IJ central line with the tip in the region of the distal SVC.  No evidence of pneumothorax.  The heart and pulmonary vasculature are within normal limits.  There is right lower lobe airspace consolidation consistent with pneumonia.   IMPRESSION: 1. New right IJ central line in good position.  No pneumothorax.  2. Right lower lobe airspace consolidation consistent with pneumonia.   EPIFANIO RENTERIA MD       Electronically Signed and Approved By: EPIFANIO RENTERIA MD on 2/16/2024 at 17:12             CT Abdomen Pelvis Without Contrast    Result Date: 2/16/2024  PROCEDURE: CT ABDOMEN PELVIS WO CONTRAST  COMPARISON: [facility], CR, XR CHEST 1 VW, 2/16/2024, 13:50.  [facility], CR, XR CHEST PA AND LATERAL, 9/19/2023, 10:40.  [facility], CR, XR CHEST 1 VW, 2/16/2024, 7:55.  [facility], CT, CT CHEST WO CONTRAST DIAGNOSTIC, 12/06/2023, 11:11.  INDICATIONS: peritonitis, persistent fever  TECHNIQUE: CT images were created without intravenous contrast.   PROTOCOL:   Standard imaging protocol performed    RADIATION:   DLP: 268mGy*cm   Automated exposure control was utilized to minimize radiation dose.  FINDINGS:  Extensive right lower lobe pneumonia.  Left lung base is clear.  Coronary artery calcifications present.  Lack of contrast limits assessment of abdominal organs and vasculature.  The liver is normal in size and contour.  There is elevation of the right hemidiaphragm.  Spleen and adrenal glands are unremarkable.  Pancreas without findings of pancreatitis.  Gallbladder present.  No pericholecystic inflammation.  Several low-attenuation bilateral renal lesions likely cysts, similar to the prior study, incompletely assessed without contrast.  There is a chronic calculus at the right renal pelvis which measures 1.1 cm x 0.8 cm, unchanged.  Negative for hydronephrosis or hydroureter.  No obstructing distal ureteral calculus.  Urinary bladder is  collapsed.  No bowel obstruction.  Colonic diverticulosis without diverticulitis.  Pneumoperitoneum in the abdomen similar to prior chest CT likely related to patient's peritoneal dialysis catheter which is located in the pelvis.  No fluid collection surrounding the dialysis catheter.  Stomach and proximal small bowel are normally configured.  No findings of appendicitis.  Extensive vascular calcifications of the abdominal aorta and branch vasculature without aneurysm.  Chronic height loss at the superior endplate of L3 with partial ankylosis at the L2-3 level.  Chronic retropulsion of the posterior aspect of the mid L3 vertebral body to the canal contribute to suspected moderate canal stenosis at L3.  No aggressive osseous lesion or acute fracture.  Right hip prosthesis partially imaged.        1. Extensive right lower lobe pneumonia. 2. Pneumoperitoneum similar to prior chest CT likely related to peritoneal dialysis catheter.  No organized fluid collection or abscess. 3. Nonobstructing 11 mm calculus at the right renal pelvis unchanged. 4. Additional chronic findings above.     LISY SANTILLAN MD       Electronically Signed and Approved By: LISY SANTILLAN MD on 2/16/2024 at 14:46             XR Chest 1 View    Result Date: 2/16/2024  PROCEDURE: XR CHEST 1 VW  COMPARISON: [facility], CT, CT ABDOMEN PELVIS WO CONTRAST, 2/16/2024, 14:16.  [facility], CR, XR CHEST 1 VW, 2/16/2024, 7:55.  INDICATIONS: worsining hypoxia  FINDINGS:  Unchanged cardiomediastinal silhouette.  Low lung volumes.  Right lower lung airspace opacity.  The no large pleural effusion or visible pneumothorax.  Pneumoperitoneum.  No acute osseous abnormality.  Probable healed left-sided rib fractures.       Right lower lobe airspace opacity consistent with pneumonia.  Pneumoperitoneum, better seen on same day CT of the abdomen/pelvis.       JOSH HAMPTON MD       Electronically Signed and Approved By: JOSH HAMPTON MD on 2/16/2024  at 14:30             XR Chest 1 View    Result Date: 2/16/2024  PROCEDURE: XR CHEST 1 VW  COMPARISON: [facility], CR, XR CHEST PA AND LATERAL, 9/19/2023, 10:40.  [facility], CT, CT CHEST WO CONTRAST DIAGNOSTIC, 12/06/2023, 11:11.  INDICATIONS: Fever  FINDINGS:  Heart size normal.  Mild elevation the right hemidiaphragm, stable.  Lungs without consolidation.  Negative for pneumothorax or pleural effusion.  External catheter tubing overlies the upper abdomen.  Aortic arch atherosclerosis.        No acute process.       LISY SANTILLAN MD       Electronically Signed and Approved By: LISY SANTILLAN MD on 2/16/2024 at 8:07                LAB RESULTS:      Lab 02/24/24  0634 02/23/24  1842 02/23/24  0420 02/22/24  0630 02/21/24  0451 02/20/24  1931 02/20/24  0518 02/19/24  1525 02/19/24  0501 02/18/24  1207 02/18/24  0528 02/17/24  2328 02/17/24  1827   WBC 8.74 7.86 8.38 7.82 8.53  --    < >  --    < >  --  11.50*  --   --    HEMOGLOBIN 8.7* 8.8* 7.6* 8.9* 9.6*  --    < >  --    < >  --  9.7*  --   --    HEMATOCRIT 27.8* 28.5* 24.6* 28.5* 29.7*  --    < >  --    < >  --  29.9*  --   --    PLATELETS 155 150 132* 123* 97*  --    < >  --    < >  --  75*  --   --    NEUTROS ABS 5.06 4.17 4.57 3.93 4.65  --    < >  --    < >  --  8.97*  --   --    IMMATURE GRANS (ABS) 0.78* 0.63* 0.65* 0.65* 0.55*  --    < >  --    < >  --   --   --   --    LYMPHS ABS 1.78 1.69 1.68 1.68 1.69  --    < >  --    < >  --   --   --   --    MONOS ABS 0.69 0.95* 1.06* 1.16* 1.35*  --    < >  --    < >  --   --   --   --    EOS ABS 0.38 0.37 0.38 0.34 0.23  --    < >  --    < >  --  0.35  --   --    MCV 97.5* 98.6* 98.4* 96.9 94.9  --    < >  --    < >  --  93.4  --   --    SED RATE  --   --   --   --   --   --   --  67*  --   --   --   --   --    CRP  --   --   --   --   --  18.23*  --  23.70*  --   --  49.54*  --   --    LACTATE  --   --   --   --   --   --   --   --   --  1.9 1.7 2.0 2.3*   PROTIME  --    --   --   --   --  13.4  --   --   --   --   --   --   --     < > = values in this interval not displayed.         Lab 02/24/24 0634 02/23/24 1842 02/23/24 0342 02/22/24 0630 02/21/24 0451 02/20/24 1931 02/20/24 0518 02/19/24  1525   SODIUM 138 139 136 137 137 136   < >  --    POTASSIUM 3.5 3.5 3.9 3.6 3.3* 3.0*   < >  --    CHLORIDE 102 102 99 101 101 100   < >  --    CO2 25.5 25.3 24.3 23.1 22.0 22.9   < >  --    ANION GAP 10.5 11.7 12.7 12.9 14.0 13.1   < >  --    BUN 28* 30* 28* 31* 32* 34*   < >  --    CREATININE 5.19* 4.82* 4.92* 4.96* 4.87* 5.02*   < >  --    EGFR 10.7* 11.7* 11.4* 11.3* 11.5* 11.1*   < >  --    GLUCOSE 192* 187* 205* 133* 129* 150*   < >  --    CALCIUM 8.7 8.7 8.5* 8.8 8.7 8.6   < >  --    MAGNESIUM 1.7  --  1.8 2.1 1.9 2.2   < >  --    PHOSPHORUS 4.2  --  3.8 4.0 2.8 3.0   < >  --    TSH  --   --   --  6.560*  --   --   --  14.800*    < > = values in this interval not displayed.         Lab 02/24/24 0634 02/23/24 1842 02/23/24 0342 02/22/24 0630 02/21/24 0451 02/20/24 1931 02/20/24 0518 02/19/24  0433 02/18/24  0528   TOTAL PROTEIN 5.7* 5.9* 5.9* 5.5* 5.4* 5.8* 5.5* 5.9* 5.5*   ALBUMIN 2.4* 2.6* 2.5* 2.4* 2.5* 2.5* 2.5* 2.5* 2.6*   GLOBULIN  --  3.3  --   --   --  3.3  --  3.4 2.9   ALT (SGPT) 14 14 15 15 15 16 14 15 13   AST (SGOT) 23 23 25 31 36 38 31 30 30   BILIRUBIN 0.2 0.2 0.2 0.3 0.4 0.3 0.3 0.4 0.5   INDIRECT BILIRUBIN  --   --   --  0.1 0.2  --  0.1  --   --    BILIRUBIN DIRECT <0.2  --  <0.2 0.2 0.2  --  0.2  --   --    ALK PHOS 53 59 55 62 56 66 59 67 50         Lab 02/20/24  1931   PROTIME 13.4   INR 1.00             Lab 02/19/24  1702 02/19/24  1525   IRON  --  67   IRON SATURATION (TSAT)  --  36   TIBC  --  186*   TRANSFERRIN  --  125*   FERRITIN  --  1,939.00*   FOLATE 11.90  --    VITAMIN B 12 >2,000*  --          Brief Urine Lab Results  (Last result in the past 365 days)        Color   Clarity   Blood   Leuk Est   Nitrite   Protein   CREAT   Urine HCG         02/16/24 1127 Dark Yellow   Clear   Negative   Negative   Negative   100 mg/dL (2+)                 Microbiology Results (last 10 days)       Procedure Component Value - Date/Time    Gastrointestinal Panel, PCR - Stool, Per Rectum [[id number]]  (Normal) Collected: 02/21/24 1402    Lab Status: Final result Specimen: Stool from Per Rectum Updated: 02/21/24 1827     Campylobacter Not Detected     Plesiomonas shigelloides Not Detected     Salmonella Not Detected     Vibrio Not Detected     Vibrio cholerae Not Detected     Yersinia enterocolitica Not Detected     Enteroaggregative E. coli (EAEC) Not Detected     Enteropathogenic E. coli (EPEC) Not Detected     Enterotoxigenic E. coli (ETEC) lt/st Not Detected     Shiga-like toxin-producing E. coli (STEC) stx1/stx2 Not Detected     Shigella/Enteroinvasive E. coli (EIEC) Not Detected     Cryptosporidium Not Detected     Cyclospora cayetanensis Not Detected     Entamoeba histolytica Not Detected     Giardia lamblia Not Detected     Adenovirus F40/41 Not Detected     Astrovirus Not Detected     Norovirus GI/GII Not Detected     Rotavirus A Not Detected     Sapovirus (I, II, IV or V) Not Detected    Narrative:      If Aeromonas, Staphylococcus aureus or Bacillus cereus are suspected, please order [id number]: Stool Culture, Aeromonas, S aureus, B Cereus.    Enteric Bacterial Panel - Stool, Per Rectum [[id number]]  (Normal) Collected: 02/20/24 1121    Lab Status: Final result Specimen: Stool from Per Rectum Updated: 02/21/24 0946     Salmonella Not Detected     Campylobacter Not Detected     Shigella/Enteroinvasive E. coli (EIEC) Not Detected     Shiga-like toxin-producing E. coli (STEC) stx1/stx2 Not Detected    Enteric Parasite Panel - Stool, Per Rectum [[id number]]  (Normal) Collected: 02/20/24 1121    Lab Status: Final result Specimen: Stool from Per Rectum Updated: 02/21/24 0951     Giardia lamblia Not Detected     Cryptosporidium Not Detected     Entamoeba histolytica Not  Detected    Blood Culture - Blood, Hand, Right [[id number]]  (Normal) Collected: 02/18/24 1441    Lab Status: Final result Specimen: Blood from Hand, Right Updated: 02/23/24 1515     Blood Culture No growth at 5 days    Blood Culture - Blood, Neck [[id number]]  (Normal) Collected: 02/18/24 1440    Lab Status: Final result Specimen: Blood from Neck Updated: 02/23/24 1515     Blood Culture No growth at 5 days    Clostridioides difficile Toxin - Stool, Per Rectum [[id number]]  (Abnormal) Collected: 02/16/24 2010    Lab Status: Final result Specimen: Stool from Per Rectum Updated: 02/16/24 2138    Narrative:      The following orders were created for panel order Clostridioides difficile Toxin - Stool, Per Rectum.  Procedure                               Abnormality         Status                     ---------                               -----------         ------                     Clostridioides difficile...[[id number]]  Abnormal            Final result                 Please view results for these tests on the individual orders.    Clostridioides difficile Toxin, PCR - Stool, Per Rectum [[id number]]  (Abnormal) Collected: 02/16/24 2010    Lab Status: Final result Specimen: Stool from Per Rectum Updated: 02/16/24 2138     Toxigenic C. difficile by PCR Positive     027 Toxin Presumptive Negative    Narrative:      DNA from a toxigenic strain of C.difficile has been detected. Antigen testing for the presence of free C.difficile toxin is currently in progress, to help determine the clinical significance of this PCR result.     Enteric Bacterial Panel - Stool, Per Rectum [[id number]]  (Normal) Collected: 02/16/24 2010    Lab Status: Final result Specimen: Stool from Per Rectum Updated: 02/17/24 1248     Salmonella Not Detected     Campylobacter Not Detected     Shigella/Enteroinvasive E. coli (EIEC) Not Detected     Shiga-like toxin-producing E. coli (STEC) stx1/stx2 Not Detected    Clostridioides difficile toxin Ag, Reflex -  Stool, Per Rectum [[id number]]  (Normal) Collected: 02/16/24 2010    Lab Status: Final result Specimen: Stool from Per Rectum Updated: 02/16/24 2139     C.diff Toxin Ag Negative    Narrative:      DNA from a toxigenic strain of C.difficile was detected, although the free toxin itself was not detected. These findings are consistent with C.difficile colonization and may not reflect actual C.difficile infection. Clinical correlation needed.    MRSA Screen, PCR (Inpatient) - Swab, Nares [[id number]]  (Normal) Collected: 02/16/24 1447    Lab Status: Final result Specimen: Swab from Nares Updated: 02/16/24 1635     MRSA PCR No MRSA Detected    Narrative:      The negative predictive value of this diagnostic test is high and should only be used to consider de-escalating anti-MRSA therapy. A positive result may indicate colonization with MRSA and must be correlated clinically.    Respiratory Panel PCR w/COVID-19(SARS-CoV-2) RA/TANIA/GAVIN/PAD/[facility] In-House, NP Swab in UTM/VTM, 2 HR TAT - Swab, Nasopharynx [[id number]]  (Normal) Collected: 02/16/24 1447    Lab Status: Final result Specimen: Swab from Nasopharynx Updated: 02/16/24 1622     ADENOVIRUS, PCR Not Detected     Coronavirus 229E Not Detected     Coronavirus HKU1 Not Detected     Coronavirus NL63 Not Detected     Coronavirus OC43 Not Detected     COVID19 Not Detected     Human Metapneumovirus Not Detected     Human Rhinovirus/Enterovirus Not Detected     Influenza A PCR Not Detected     Influenza B PCR Not Detected     Parainfluenza Virus 1 Not Detected     Parainfluenza Virus 2 Not Detected     Parainfluenza Virus 3 Not Detected     Parainfluenza Virus 4 Not Detected     RSV, PCR Not Detected     Bordetella pertussis pcr Not Detected     Bordetella parapertussis PCR Not Detected     Chlamydophila pneumoniae PCR Not Detected     Mycoplasma pneumo by PCR Not Detected    Narrative:      In the setting of a positive respiratory panel with a viral infection PLUS a negative  procalcitonin without other underlying concern for bacterial infection, consider observing off antibiotics or discontinuation of antibiotics and continue supportive care. If the respiratory panel is positive for atypical bacterial infection (Bordetella pertussis, Chlamydophila pneumoniae, or Mycoplasma pneumoniae), consider antibiotic de-escalation to target atypical bacterial infection.    Legionella Antigen, Urine - Urine, Urine, Clean Catch [[id number]]  (Normal) Collected: 02/16/24 1127    Lab Status: Final result Specimen: Urine, Clean Catch Updated: 02/16/24 1322     LEGIONELLA ANTIGEN, URINE Negative    S. Pneumo Ag Urine or CSF - Urine, Urine, Clean Catch [[id number]]  (Normal) Collected: 02/16/24 1127    Lab Status: Final result Specimen: Urine, Clean Catch Updated: 02/16/24 1322     Strep Pneumo Ag Negative    Body Fluid Culture - Body Fluid, Peritoneum [[id number]] Collected: 02/16/24 1030    Lab Status: Final result Specimen: Body Fluid from Peritoneum Updated: 02/19/24 0852     Body Fluid Culture No growth at 3 days     Gram Stain No WBCs seen      No organisms seen    Blood Culture - Blood, Arm, Left [[id number]]  (Abnormal) Collected: 02/16/24 0937    Lab Status: Final result Specimen: Blood from Arm, Left Updated: 02/18/24 0922     Blood Culture Escherichia coli     Isolated from Aerobic and Anaerobic Bottles     Gram Stain Anaerobic Bottle Gram negative bacilli      Aerobic Bottle Gram negative bacilli    Narrative:      Refer to previous blood culture collected on 2/16 for andreas's      COVID-19, FLU A/B, RSV PCR 1 HR TAT - Swab, Nasopharynx [[id number]]  (Normal) Collected: 02/16/24 0753    Lab Status: Final result Specimen: Swab from Nasopharynx Updated: 02/16/24 0833     COVID19 Not Detected     Influenza A PCR Not Detected     Influenza B PCR Not Detected     RSV, PCR Not Detected    Narrative:      Fact sheet for providers: https://www.fda.gov/media/447507/download    Fact sheet for patients:  https://www.fda.gov/media/509851/download    Test performed by PCR.    Blood Culture - Blood, Arm, Right [[id number]]  (Abnormal)  (Susceptibility) Collected: 02/16/24 0746    Lab Status: Final result Specimen: Blood from Arm, Right Updated: 02/18/24 0922     Blood Culture Escherichia coli     Isolated from Aerobic and Anaerobic Bottles     Gram Stain Aerobic Bottle Gram negative bacilli      Anaerobic Bottle Gram negative bacilli    Susceptibility        Escherichia coli      RITU      Amoxicillin + Clavulanate Susceptible      Ampicillin Resistant      Ampicillin + Sulbactam Resistant      Cefepime Susceptible      Ceftazidime Susceptible      Ceftriaxone Susceptible      Gentamicin Susceptible      Levofloxacin Resistant      Piperacillin + Tazobactam Susceptible      Trimethoprim + Sulfamethoxazole Susceptible                       Susceptibility Comments       Escherichia coli    Cefazolin sensitivity will not be reported for Enterobacteriaceae in non-urine isolates. If cefazolin is preferred, please call the microbiology lab to request an E-test.  With the exception of urinary-sourced infections, aminoglycosides should not be used as monotherapy.               Blood Culture ID, PCR - Blood, Arm, Right [[id number]]  (Abnormal) Collected: 02/16/24 0746    Lab Status: Final result Specimen: Blood from Arm, Right Updated: 02/16/24 2146     BCID, PCR Escherichia coli. Identification by BCID2 PCR.     BOTTLE TYPE Aerobic Bottle    Narrative:      No resistance genes detected.            CT Abdomen Pelvis With Contrast    Result Date: 2/22/2024  Impression:   1. Persistent right lower lobe pneumonia within new tiny right pleural effusion. 2. No clearly acute findings in the GI tract.  No evidence of acute colitis or enteritis.  No bowel obstruction.  There is colonic diverticulosis without diverticulitis. 3. Stable nonobstructing large stone in the right renal pelvis.  No hydronephrosis on either side. 4. Cholelithiasis  without biliary obstruction. 5. Increase volume of free fluid in the abdomen and pelvis, likely dialysate. 6. Appendectomy and right hip arthroplasty.      ERIC ELLISON MD       Electronically Signed and Approved By: ERIC ELLISON MD on 2/22/2024 at 22:44             FL Esophagram w Modified Barium Swallow Single Contrast    Result Date: 2/19/2024  Impression: 1. Presbyesophagus 2. Prominent cricopharyngeal bar 3. Laryngeal penetration and silent tracheal aspiration with thin liquid barium     HUYEN SANDHU       Electronically Signed and Approved By: Angel Luis Barrientos M.D. on 2/19/2024 at 17:24             CT Abdomen Pelvis Without Contrast    Result Date: 2/16/2024  Impression:   1. Extensive right lower lobe pneumonia. 2. Pneumoperitoneum similar to prior chest CT likely related to peritoneal dialysis catheter.  No organized fluid collection or abscess. 3. Nonobstructing 11 mm calculus at the right renal pelvis unchanged. 4. Additional chronic findings above.     LISY SANTILLAN MD       Electronically Signed and Approved By: LISY SANTILLAN MD on 2/16/2024 at 14:46             XR Chest 1 View    Result Date: 2/16/2024  Impression:  Right lower lobe airspace opacity consistent with pneumonia.  Pneumoperitoneum, better seen on same day CT of the abdomen/pelvis.       JOSH HAMPTON MD       Electronically Signed and Approved By: JOSH HAMPTON MD on 2/16/2024 at 14:30             XR Chest 1 View    Result Date: 2/16/2024  Impression:   No acute process.       LISY SANTILLAN MD       Electronically Signed and Approved By: LISY SANTILLAN MD on 2/16/2024 at 8:07                      Results for orders placed during the hospital encounter of 02/16/24    Adult Transthoracic Echo Complete W/ Cont if Necessary Per Protocol    Interpretation Summary    Left ventricular systolic function is normal. Calculated left ventricular EF = 56.6%    Left ventricular wall thickness is consistent with mild concentric hypertrophy.    Left  ventricular diastolic function is consistent with (grade I) impaired relaxation and age.    Aortic valve sclerosis with minimal aortic valve stenosis is present.      Labs Pending at Discharge:  Pending Labs       Order Current Status    Calprotectin, Fecal - Stool, Per Rectum In process    Electrolytes, Stool - Stool, Per Rectum In process    Pancreatic Elastase, Fecal - Stool, Per Rectum In process              Time spent on Discharge including face to face service:  35 minutes    Electronically signed by Saúl Vargas MD, 02/24/24, 6:09 PM EST.    Portions of this documentation were transcribed electronically from a voice recognition software.  I confirm all data accurately represents the service(s) I performed at today's visit.

## 2024-02-25 NOTE — OUTREACH NOTE
Prep Survey      Flowsheet Row Responses   [facility] facility patient discharged from? [facility]   Is LACE score < 7 ? No   Eligibility [facility]   H[facility]   Date of Admission 02/16/24   Date of Discharge 02/24/24   Discharge Disposition Home-Health Care Svc   Discharge diagnosis Peritonitiis/Septic shock   Does the patient have one of the following disease processes/diagnoses(primary or secondary)? Sepsis   Does the patient have Home health ordered? Yes   What is the Home health agency?  [facility]   Is there a DME ordered? No   Prep survey completed? Yes            CHAITANYA JEREZ - Registered Nurse

## 2024-02-26 ENCOUNTER — TRANSITIONAL CARE MANAGEMENT TELEPHONE ENCOUNTER (OUTPATIENT)
Dept: CALL CENTER | Facility: HOSPITAL | Age: 79
End: 2024-02-26
Payer: MEDICARE

## 2024-02-26 LAB — ELASTASE PANC STL-MCNT: 381 UG ELAST./G

## 2024-02-26 NOTE — OUTREACH NOTE
Call Center TCM Note      Flowsheet Row Responses   [facility] facility patient discharged from? [facility]   Does the patient have one of the following disease processes/diagnoses(primary or secondary)? Sepsis   TCM attempt successful? No   Unsuccessful attempts Attempt 2            Estephania Arzate RN    2/26/2024, 12:22 EST

## 2024-02-26 NOTE — OUTREACH NOTE
Call Center TCM Note      Flowsheet Row Responses   [facility] facility patient discharged from? [facility]   Does the patient have one of the following disease processes/diagnoses(primary or secondary)? Sepsis   TCM attempt successful? No  [no current verbal release on file]   Unsuccessful attempts Attempt 1   Call Status Left message            Estephania Arzate RN    2/26/2024, 08:52 EST

## 2024-02-27 ENCOUNTER — TRANSITIONAL CARE MANAGEMENT TELEPHONE ENCOUNTER (OUTPATIENT)
Dept: CALL CENTER | Facility: HOSPITAL | Age: 79
End: 2024-02-27
Payer: MEDICARE

## 2024-02-27 LAB — CALPROTECTIN STL-MCNT: 8 UG/G (ref 0–120)

## 2024-02-27 NOTE — OUTREACH NOTE
Call Center TCM Note      Flowsheet Row Responses   [facility] facility patient discharged from? [facility]   Does the patient have one of the following disease processes/diagnoses(primary or secondary)? Sepsis   TCM attempt successful? No   Unsuccessful attempts Attempt 3            Estephania Arzate RN    2/27/2024, 08:23 EST

## 2024-02-28 ENCOUNTER — APPOINTMENT (OUTPATIENT)
Dept: CT IMAGING | Facility: HOSPITAL | Age: 79
DRG: 193 | End: 2024-02-28
Payer: MEDICARE

## 2024-02-28 ENCOUNTER — HOSPITAL ENCOUNTER (INPATIENT)
Facility: HOSPITAL | Age: 79
LOS: 9 days | Discharge: HOME HEALTH | DRG: 193 | End: 2024-03-09
Attending: EMERGENCY MEDICINE | Admitting: FAMILY MEDICINE
Payer: MEDICARE

## 2024-02-28 ENCOUNTER — APPOINTMENT (OUTPATIENT)
Dept: GENERAL RADIOLOGY | Facility: HOSPITAL | Age: 79
DRG: 193 | End: 2024-02-28
Payer: MEDICARE

## 2024-02-28 ENCOUNTER — TELEPHONE (OUTPATIENT)
Dept: INTERNAL MEDICINE | Facility: CLINIC | Age: 79
End: 2024-02-28
Payer: MEDICARE

## 2024-02-28 DIAGNOSIS — F30.9 MANIA: ICD-10-CM

## 2024-02-28 DIAGNOSIS — J18.9 HCAP (HEALTHCARE-ASSOCIATED PNEUMONIA): ICD-10-CM

## 2024-02-28 DIAGNOSIS — J18.9 MULTIFOCAL PNEUMONIA: ICD-10-CM

## 2024-02-28 DIAGNOSIS — J69.0 ASPIRATION PNEUMONITIS: ICD-10-CM

## 2024-02-28 DIAGNOSIS — Z78.9 DECREASED ACTIVITIES OF DAILY LIVING (ADL): ICD-10-CM

## 2024-02-28 DIAGNOSIS — R13.12 OROPHARYNGEAL DYSPHAGIA: ICD-10-CM

## 2024-02-28 DIAGNOSIS — J96.01 ACUTE RESPIRATORY FAILURE WITH HYPOXIA: Primary | ICD-10-CM

## 2024-02-28 DIAGNOSIS — R26.2 DIFFICULTY IN WALKING: ICD-10-CM

## 2024-02-28 DIAGNOSIS — J96.21 ACUTE ON CHRONIC RESPIRATORY FAILURE WITH HYPOXIA: ICD-10-CM

## 2024-02-28 LAB
ALBUMIN SERPL-MCNC: 2.6 G/DL (ref 3.5–5.2)
ALBUMIN/GLOB SERPL: 0.8 G/DL
ALP SERPL-CCNC: 65 U/L (ref 39–117)
ALT SERPL W P-5'-P-CCNC: 13 U/L (ref 1–41)
ANION GAP SERPL CALCULATED.3IONS-SCNC: 11.3 MMOL/L (ref 5–15)
AST SERPL-CCNC: 34 U/L (ref 1–40)
BASOPHILS # BLD AUTO: 0.05 10*3/MM3 (ref 0–0.2)
BASOPHILS NFR BLD AUTO: 0.5 % (ref 0–1.5)
BILIRUB SERPL-MCNC: 0.3 MG/DL (ref 0–1.2)
BUN SERPL-MCNC: 22 MG/DL (ref 8–23)
BUN/CREAT SERPL: 3.6 (ref 7–25)
CALCIUM SPEC-SCNC: 8.3 MG/DL (ref 8.6–10.5)
CHLORIDE SERPL-SCNC: 99 MMOL/L (ref 98–107)
CO2 SERPL-SCNC: 28.7 MMOL/L (ref 22–29)
CREAT SERPL-MCNC: 6.04 MG/DL (ref 0.76–1.27)
D-LACTATE SERPL-SCNC: 2.6 MMOL/L (ref 0.5–2)
DEPRECATED RDW RBC AUTO: 60.2 FL (ref 37–54)
EGFRCR SERPLBLD CKD-EPI 2021: 8.9 ML/MIN/1.73
EOSINOPHIL # BLD AUTO: 0.45 10*3/MM3 (ref 0–0.4)
EOSINOPHIL NFR BLD AUTO: 4.5 % (ref 0.3–6.2)
ERYTHROCYTE [DISTWIDTH] IN BLOOD BY AUTOMATED COUNT: 17.7 % (ref 12.3–15.4)
FLUAV SUBTYP SPEC NAA+PROBE: NOT DETECTED
FLUBV RNA ISLT QL NAA+PROBE: NOT DETECTED
GLOBULIN UR ELPH-MCNC: 3.3 GM/DL
GLUCOSE SERPL-MCNC: 164 MG/DL (ref 65–99)
HCT VFR BLD AUTO: 34.8 % (ref 37.5–51)
HEMOCCULT STL QL IA: NEGATIVE
HGB BLD-MCNC: 10.9 G/DL (ref 13–17.7)
HOLD SPECIMEN: NORMAL
HOLD SPECIMEN: NORMAL
IMM GRANULOCYTES # BLD AUTO: 0.13 10*3/MM3 (ref 0–0.05)
IMM GRANULOCYTES NFR BLD AUTO: 1.3 % (ref 0–0.5)
LYMPHOCYTES # BLD AUTO: 1.53 10*3/MM3 (ref 0.7–3.1)
LYMPHOCYTES NFR BLD AUTO: 15.2 % (ref 19.6–45.3)
MCH RBC QN AUTO: 30.2 PG (ref 26.6–33)
MCHC RBC AUTO-ENTMCNC: 31.3 G/DL (ref 31.5–35.7)
MCV RBC AUTO: 96.4 FL (ref 79–97)
MONOCYTES # BLD AUTO: 0.79 10*3/MM3 (ref 0.1–0.9)
MONOCYTES NFR BLD AUTO: 7.8 % (ref 5–12)
NEUTROPHILS NFR BLD AUTO: 7.14 10*3/MM3 (ref 1.7–7)
NEUTROPHILS NFR BLD AUTO: 70.7 % (ref 42.7–76)
NRBC BLD AUTO-RTO: 0.2 /100 WBC (ref 0–0.2)
NT-PROBNP SERPL-MCNC: ABNORMAL PG/ML (ref 0–1800)
PLATELET # BLD AUTO: 230 10*3/MM3 (ref 140–450)
PMV BLD AUTO: 11.2 FL (ref 6–12)
POTASSIUM SERPL-SCNC: 4.1 MMOL/L (ref 3.5–5.2)
PROT SERPL-MCNC: 5.9 G/DL (ref 6–8.5)
RBC # BLD AUTO: 3.61 10*6/MM3 (ref 4.14–5.8)
RSV RNA NPH QL NAA+NON-PROBE: NOT DETECTED
SARS-COV-2 RNA RESP QL NAA+PROBE: NOT DETECTED
SODIUM SERPL-SCNC: 139 MMOL/L (ref 136–145)
TROPONIN T SERPL HS-MCNC: 196 NG/L
WBC NRBC COR # BLD AUTO: 10.09 10*3/MM3 (ref 3.4–10.8)
WHOLE BLOOD HOLD COAG: NORMAL
WHOLE BLOOD HOLD SPECIMEN: NORMAL

## 2024-02-28 PROCEDURE — 93005 ELECTROCARDIOGRAM TRACING: CPT | Performed by: EMERGENCY MEDICINE

## 2024-02-28 PROCEDURE — 84484 ASSAY OF TROPONIN QUANT: CPT | Performed by: EMERGENCY MEDICINE

## 2024-02-28 PROCEDURE — 83880 ASSAY OF NATRIURETIC PEPTIDE: CPT | Performed by: EMERGENCY MEDICINE

## 2024-02-28 PROCEDURE — 80053 COMPREHEN METABOLIC PANEL: CPT | Performed by: EMERGENCY MEDICINE

## 2024-02-28 PROCEDURE — 99285 EMERGENCY DEPT VISIT HI MDM: CPT

## 2024-02-28 PROCEDURE — 85025 COMPLETE CBC W/AUTO DIFF WBC: CPT

## 2024-02-28 PROCEDURE — 83605 ASSAY OF LACTIC ACID: CPT | Performed by: EMERGENCY MEDICINE

## 2024-02-28 PROCEDURE — 82274 ASSAY TEST FOR BLOOD FECAL: CPT | Performed by: EMERGENCY MEDICINE

## 2024-02-28 PROCEDURE — 71045 X-RAY EXAM CHEST 1 VIEW: CPT

## 2024-02-28 PROCEDURE — 93005 ELECTROCARDIOGRAM TRACING: CPT

## 2024-02-28 PROCEDURE — 93010 ELECTROCARDIOGRAM REPORT: CPT | Performed by: INTERNAL MEDICINE

## 2024-02-28 PROCEDURE — 36415 COLL VENOUS BLD VENIPUNCTURE: CPT

## 2024-02-28 PROCEDURE — 71250 CT THORAX DX C-: CPT

## 2024-02-28 PROCEDURE — 87637 SARSCOV2&INF A&B&RSV AMP PRB: CPT | Performed by: EMERGENCY MEDICINE

## 2024-02-28 PROCEDURE — 87040 BLOOD CULTURE FOR BACTERIA: CPT

## 2024-02-28 PROCEDURE — 94799 UNLISTED PULMONARY SVC/PX: CPT

## 2024-02-28 RX ORDER — SODIUM CHLORIDE 0.9 % (FLUSH) 0.9 %
10 SYRINGE (ML) INJECTION AS NEEDED
Status: DISCONTINUED | OUTPATIENT
Start: 2024-02-28 | End: 2024-03-09 | Stop reason: HOSPADM

## 2024-02-28 NOTE — TELEPHONE ENCOUNTER
Sabina from home health called and wanted to update the provider on patient. Sabina stated that starting next week 2x a week for 2 weeks. Then one time a week for 2 weeks.

## 2024-02-29 PROBLEM — J18.9 HCAP (HEALTHCARE-ASSOCIATED PNEUMONIA): Status: ACTIVE | Noted: 2024-02-29

## 2024-02-29 PROBLEM — N18.6 ESRD (END STAGE RENAL DISEASE): Status: ACTIVE | Noted: 2024-02-29

## 2024-02-29 PROBLEM — J96.21 ACUTE ON CHRONIC RESPIRATORY FAILURE WITH HYPOXIA: Status: ACTIVE | Noted: 2024-02-29

## 2024-02-29 LAB
APTT PPP: 30 SECONDS (ref 78–95.9)
APTT PPP: 57.4 SECONDS (ref 78–95.9)
APTT PPP: >200 SECONDS (ref 78–95.9)
B PARAPERT DNA SPEC QL NAA+PROBE: NOT DETECTED
B PERT DNA SPEC QL NAA+PROBE: NOT DETECTED
BASE EXCESS BLDA CALC-SCNC: 1.4 MMOL/L (ref -2–2)
BDY SITE: ABNORMAL
C PNEUM DNA NPH QL NAA+NON-PROBE: NOT DETECTED
CHLORIDE STL-SCNC: ABNORMAL MMOL/L
COHGB MFR BLD: 0.1 % (ref 0–1.5)
CRP SERPL-MCNC: 5.42 MG/DL (ref 0–0.5)
D-LACTATE SERPL-SCNC: 1.8 MMOL/L (ref 0.5–2)
FHHB: 5.2 % (ref 0–5)
FLUAV SUBTYP SPEC NAA+PROBE: NOT DETECTED
FLUBV RNA ISLT QL NAA+PROBE: NOT DETECTED
GAS FLOW AIRWAY: 3 LPM
GEN 5 2HR TROPONIN T REFLEX: 428 NG/L
GLUCOSE BLDC GLUCOMTR-MCNC: 105 MG/DL (ref 70–99)
GLUCOSE BLDC GLUCOMTR-MCNC: 112 MG/DL (ref 70–99)
GLUCOSE BLDC GLUCOMTR-MCNC: 119 MG/DL (ref 70–99)
GLUCOSE BLDC GLUCOMTR-MCNC: 94 MG/DL (ref 70–99)
HADV DNA SPEC NAA+PROBE: NOT DETECTED
HCO3 BLDA-SCNC: 26.6 MMOL/L (ref 22–26)
HCOV 229E RNA SPEC QL NAA+PROBE: NOT DETECTED
HCOV HKU1 RNA SPEC QL NAA+PROBE: NOT DETECTED
HCOV NL63 RNA SPEC QL NAA+PROBE: NOT DETECTED
HCOV OC43 RNA SPEC QL NAA+PROBE: NOT DETECTED
HGB BLDA-MCNC: 10.8 G/DL (ref 13.8–16.4)
HMPV RNA NPH QL NAA+NON-PROBE: NOT DETECTED
HPIV1 RNA ISLT QL NAA+PROBE: NOT DETECTED
HPIV2 RNA SPEC QL NAA+PROBE: NOT DETECTED
HPIV3 RNA NPH QL NAA+PROBE: NOT DETECTED
HPIV4 P GENE NPH QL NAA+PROBE: NOT DETECTED
INHALED O2 CONCENTRATION: 32 %
INR PPP: 1.13 (ref 0.86–1.15)
LACTATE BLDA-SCNC: ABNORMAL MMOL/L
M PNEUMO IGG SER IA-ACNC: NOT DETECTED
METHGB BLD QL: 0 % (ref 0–1.5)
MODALITY: ABNORMAL
MRSA DNA SPEC QL NAA+PROBE: NORMAL
OXYHGB MFR BLDV: 94.7 % (ref 94–99)
PCO2 BLDA: 44.6 MM HG (ref 35–45)
PH BLDA: 7.39 PH UNITS (ref 7.35–7.45)
PO2 BLD: 263 MM[HG] (ref 0–500)
PO2 BLDA: 84 MM HG (ref 80–100)
POTASSIUM STL-SCNC: ABNORMAL MMOL/L
PROCALCITONIN SERPL-MCNC: 0.73 NG/ML (ref 0–0.25)
PROTHROMBIN TIME: 14.7 SECONDS (ref 11.8–14.9)
QT INTERVAL: 426 MS
QTC INTERVAL: 501 MS
RHINOVIRUS RNA SPEC NAA+PROBE: NOT DETECTED
RSV RNA NPH QL NAA+NON-PROBE: NOT DETECTED
SAO2 % BLDCOA: 94.8 % (ref 95–99)
SARS-COV-2 RNA RESP QL NAA+PROBE: NOT DETECTED
SODIUM STL-SCNC: ABNORMAL MMOL/L
T4 FREE SERPL-MCNC: 0.95 NG/DL (ref 0.93–1.7)
TROPONIN T DELTA: 232 NG/L
TSH SERPL DL<=0.05 MIU/L-ACNC: 8.76 UIU/ML (ref 0.27–4.2)

## 2024-02-29 PROCEDURE — 36600 WITHDRAWAL OF ARTERIAL BLOOD: CPT | Performed by: EMERGENCY MEDICINE

## 2024-02-29 PROCEDURE — 0202U NFCT DS 22 TRGT SARS-COV-2: CPT

## 2024-02-29 PROCEDURE — 92610 EVALUATE SWALLOWING FUNCTION: CPT

## 2024-02-29 PROCEDURE — 85730 THROMBOPLASTIN TIME PARTIAL: CPT | Performed by: FAMILY MEDICINE

## 2024-02-29 PROCEDURE — 82805 BLOOD GASES W/O2 SATURATION: CPT | Performed by: EMERGENCY MEDICINE

## 2024-02-29 PROCEDURE — 25010000002 CEFEPIME PER 500 MG: Performed by: FAMILY MEDICINE

## 2024-02-29 PROCEDURE — 97161 PT EVAL LOW COMPLEX 20 MIN: CPT

## 2024-02-29 PROCEDURE — 94761 N-INVAS EAR/PLS OXIMETRY MLT: CPT

## 2024-02-29 PROCEDURE — 82948 REAGENT STRIP/BLOOD GLUCOSE: CPT

## 2024-02-29 PROCEDURE — 25010000002 CEFEPIME PER 500 MG: Performed by: EMERGENCY MEDICINE

## 2024-02-29 PROCEDURE — 99222 1ST HOSP IP/OBS MODERATE 55: CPT | Performed by: INTERNAL MEDICINE

## 2024-02-29 PROCEDURE — 82375 ASSAY CARBOXYHB QUANT: CPT | Performed by: EMERGENCY MEDICINE

## 2024-02-29 PROCEDURE — 84443 ASSAY THYROID STIM HORMONE: CPT | Performed by: FAMILY MEDICINE

## 2024-02-29 PROCEDURE — 25010000002 HEPARIN (PORCINE) 25000-0.45 UT/250ML-% SOLUTION: Performed by: FAMILY MEDICINE

## 2024-02-29 PROCEDURE — 25810000003 SODIUM CHLORIDE 0.9 % SOLUTION: Performed by: EMERGENCY MEDICINE

## 2024-02-29 PROCEDURE — 86140 C-REACTIVE PROTEIN: CPT | Performed by: FAMILY MEDICINE

## 2024-02-29 PROCEDURE — 25010000002 VANCOMYCIN 5 G RECONSTITUTED SOLUTION: Performed by: EMERGENCY MEDICINE

## 2024-02-29 PROCEDURE — 99223 1ST HOSP IP/OBS HIGH 75: CPT | Performed by: STUDENT IN AN ORGANIZED HEALTH CARE EDUCATION/TRAINING PROGRAM

## 2024-02-29 PROCEDURE — 84484 ASSAY OF TROPONIN QUANT: CPT | Performed by: EMERGENCY MEDICINE

## 2024-02-29 PROCEDURE — 87641 MR-STAPH DNA AMP PROBE: CPT | Performed by: FAMILY MEDICINE

## 2024-02-29 PROCEDURE — 94640 AIRWAY INHALATION TREATMENT: CPT

## 2024-02-29 PROCEDURE — 94799 UNLISTED PULMONARY SVC/PX: CPT

## 2024-02-29 PROCEDURE — 84145 PROCALCITONIN (PCT): CPT | Performed by: FAMILY MEDICINE

## 2024-02-29 PROCEDURE — 83050 HGB METHEMOGLOBIN QUAN: CPT | Performed by: EMERGENCY MEDICINE

## 2024-02-29 PROCEDURE — 84439 ASSAY OF FREE THYROXINE: CPT | Performed by: FAMILY MEDICINE

## 2024-02-29 PROCEDURE — 85610 PROTHROMBIN TIME: CPT | Performed by: FAMILY MEDICINE

## 2024-02-29 PROCEDURE — 97165 OT EVAL LOW COMPLEX 30 MIN: CPT

## 2024-02-29 PROCEDURE — 83605 ASSAY OF LACTIC ACID: CPT | Performed by: FAMILY MEDICINE

## 2024-02-29 RX ORDER — SODIUM CHLORIDE 9 MG/ML
40 INJECTION, SOLUTION INTRAVENOUS AS NEEDED
Status: DISCONTINUED | OUTPATIENT
Start: 2024-02-29 | End: 2024-03-09 | Stop reason: HOSPADM

## 2024-02-29 RX ORDER — IBUPROFEN 600 MG/1
1 TABLET ORAL
Status: DISCONTINUED | OUTPATIENT
Start: 2024-02-29 | End: 2024-03-09 | Stop reason: HOSPADM

## 2024-02-29 RX ORDER — SACCHAROMYCES BOULARDII 250 MG
250 CAPSULE ORAL 2 TIMES DAILY
Status: DISCONTINUED | OUTPATIENT
Start: 2024-02-29 | End: 2024-03-09 | Stop reason: HOSPADM

## 2024-02-29 RX ORDER — SEVELAMER CARBONATE 800 MG/1
1600 TABLET, FILM COATED ORAL 2 TIMES DAILY WITH MEALS
Status: DISCONTINUED | OUTPATIENT
Start: 2024-02-29 | End: 2024-03-06

## 2024-02-29 RX ORDER — VANCOMYCIN HYDROCHLORIDE 125 MG/1
125 CAPSULE ORAL EVERY 6 HOURS SCHEDULED
Status: DISCONTINUED | OUTPATIENT
Start: 2024-02-29 | End: 2024-03-01

## 2024-02-29 RX ORDER — LEVOTHYROXINE SODIUM 0.12 MG/1
125 TABLET ORAL
Status: DISCONTINUED | OUTPATIENT
Start: 2024-02-29 | End: 2024-03-06

## 2024-02-29 RX ORDER — GENTAMICIN SULFATE 1 MG/G
1 CREAM TOPICAL DAILY
Status: DISCONTINUED | OUTPATIENT
Start: 2024-02-29 | End: 2024-03-09 | Stop reason: HOSPADM

## 2024-02-29 RX ORDER — ONDANSETRON 2 MG/ML
4 INJECTION INTRAMUSCULAR; INTRAVENOUS EVERY 6 HOURS PRN
Status: DISCONTINUED | OUTPATIENT
Start: 2024-02-29 | End: 2024-03-09 | Stop reason: HOSPADM

## 2024-02-29 RX ORDER — BENZONATATE 100 MG/1
100 CAPSULE ORAL 3 TIMES DAILY PRN
Status: DISCONTINUED | OUTPATIENT
Start: 2024-02-29 | End: 2024-03-09 | Stop reason: HOSPADM

## 2024-02-29 RX ORDER — BISACODYL 5 MG/1
5 TABLET, DELAYED RELEASE ORAL DAILY PRN
Status: DISCONTINUED | OUTPATIENT
Start: 2024-02-29 | End: 2024-03-09 | Stop reason: HOSPADM

## 2024-02-29 RX ORDER — HEPARIN SODIUM 10000 [USP'U]/100ML
12 INJECTION, SOLUTION INTRAVENOUS
Status: DISCONTINUED | OUTPATIENT
Start: 2024-02-29 | End: 2024-03-01

## 2024-02-29 RX ORDER — ASPIRIN 81 MG/1
81 TABLET ORAL DAILY
Status: DISCONTINUED | OUTPATIENT
Start: 2024-02-29 | End: 2024-03-09 | Stop reason: HOSPADM

## 2024-02-29 RX ORDER — SODIUM CHLORIDE 0.9 % (FLUSH) 0.9 %
10 SYRINGE (ML) INJECTION EVERY 12 HOURS SCHEDULED
Status: DISCONTINUED | OUTPATIENT
Start: 2024-02-29 | End: 2024-03-09 | Stop reason: HOSPADM

## 2024-02-29 RX ORDER — CHOLECALCIFEROL (VITAMIN D3) 125 MCG
5 CAPSULE ORAL NIGHTLY
Status: DISCONTINUED | OUTPATIENT
Start: 2024-02-29 | End: 2024-03-09 | Stop reason: HOSPADM

## 2024-02-29 RX ORDER — POLYETHYLENE GLYCOL 3350 17 G/17G
17 POWDER, FOR SOLUTION ORAL DAILY PRN
Status: DISCONTINUED | OUTPATIENT
Start: 2024-02-29 | End: 2024-03-09 | Stop reason: HOSPADM

## 2024-02-29 RX ORDER — AMOXICILLIN 250 MG
2 CAPSULE ORAL 2 TIMES DAILY PRN
Status: DISCONTINUED | OUTPATIENT
Start: 2024-02-29 | End: 2024-03-09 | Stop reason: HOSPADM

## 2024-02-29 RX ORDER — SODIUM CHLORIDE, SODIUM LACTATE, CALCIUM CHLORIDE, MAGNESIUM CHLORIDE AND DEXTROSE 1.5; 538; 448; 18.3; 5.08 G/100ML; MG/100ML; MG/100ML; MG/100ML; MG/100ML
2000 INJECTION, SOLUTION INTRAPERITONEAL
Status: DISCONTINUED | OUTPATIENT
Start: 2024-02-29 | End: 2024-02-29

## 2024-02-29 RX ORDER — HEPARIN SODIUM 5000 [USP'U]/ML
5000 INJECTION, SOLUTION INTRAVENOUS; SUBCUTANEOUS EVERY 12 HOURS SCHEDULED
Status: DISCONTINUED | OUTPATIENT
Start: 2024-02-29 | End: 2024-02-29

## 2024-02-29 RX ORDER — CHOLECALCIFEROL (VITAMIN D3) 125 MCG
5 CAPSULE ORAL NIGHTLY
Status: DISCONTINUED | OUTPATIENT
Start: 2024-02-29 | End: 2024-02-29

## 2024-02-29 RX ORDER — GUAIFENESIN 600 MG/1
600 TABLET, EXTENDED RELEASE ORAL EVERY 12 HOURS SCHEDULED
Status: DISCONTINUED | OUTPATIENT
Start: 2024-02-29 | End: 2024-03-05

## 2024-02-29 RX ORDER — SODIUM CHLORIDE 0.9 % (FLUSH) 0.9 %
10 SYRINGE (ML) INJECTION AS NEEDED
Status: DISCONTINUED | OUTPATIENT
Start: 2024-02-29 | End: 2024-03-09 | Stop reason: HOSPADM

## 2024-02-29 RX ORDER — SODIUM CHLORIDE 9 MG/ML
50 INJECTION, SOLUTION INTRAVENOUS CONTINUOUS
Status: ACTIVE | OUTPATIENT
Start: 2024-02-29 | End: 2024-02-29

## 2024-02-29 RX ORDER — SODIUM CHLORIDE, SODIUM LACTATE, CALCIUM CHLORIDE, MAGNESIUM CHLORIDE AND DEXTROSE 1.5; 538; 448; 18.3; 5.08 G/100ML; MG/100ML; MG/100ML; MG/100ML; MG/100ML
6000 INJECTION, SOLUTION INTRAPERITONEAL
Status: DISCONTINUED | OUTPATIENT
Start: 2024-02-29 | End: 2024-03-09 | Stop reason: HOSPADM

## 2024-02-29 RX ORDER — NICOTINE POLACRILEX 4 MG
15 LOZENGE BUCCAL
Status: DISCONTINUED | OUTPATIENT
Start: 2024-02-29 | End: 2024-03-09 | Stop reason: HOSPADM

## 2024-02-29 RX ORDER — IPRATROPIUM BROMIDE AND ALBUTEROL SULFATE 2.5; .5 MG/3ML; MG/3ML
3 SOLUTION RESPIRATORY (INHALATION) EVERY 4 HOURS PRN
Status: DISCONTINUED | OUTPATIENT
Start: 2024-02-29 | End: 2024-03-09 | Stop reason: HOSPADM

## 2024-02-29 RX ORDER — BISACODYL 10 MG
10 SUPPOSITORY, RECTAL RECTAL DAILY PRN
Status: DISCONTINUED | OUTPATIENT
Start: 2024-02-29 | End: 2024-03-09 | Stop reason: HOSPADM

## 2024-02-29 RX ORDER — DEXTROSE MONOHYDRATE 25 G/50ML
25 INJECTION, SOLUTION INTRAVENOUS
Status: DISCONTINUED | OUTPATIENT
Start: 2024-02-29 | End: 2024-03-09 | Stop reason: HOSPADM

## 2024-02-29 RX ORDER — CHOLECALCIFEROL (VITAMIN D3) 125 MCG
5 CAPSULE ORAL ONCE
Status: COMPLETED | OUTPATIENT
Start: 2024-02-29 | End: 2024-02-29

## 2024-02-29 RX ORDER — ATORVASTATIN CALCIUM 40 MG/1
40 TABLET, FILM COATED ORAL DAILY
Status: DISCONTINUED | OUTPATIENT
Start: 2024-03-01 | End: 2024-03-09 | Stop reason: HOSPADM

## 2024-02-29 RX ORDER — BUDESONIDE 0.5 MG/2ML
0.5 INHALANT ORAL
Status: DISCONTINUED | OUTPATIENT
Start: 2024-02-29 | End: 2024-03-09 | Stop reason: HOSPADM

## 2024-02-29 RX ORDER — ARFORMOTEROL TARTRATE 15 UG/2ML
15 SOLUTION RESPIRATORY (INHALATION)
Status: DISCONTINUED | OUTPATIENT
Start: 2024-02-29 | End: 2024-03-09 | Stop reason: HOSPADM

## 2024-02-29 RX ORDER — ACETAMINOPHEN 325 MG/1
650 TABLET ORAL EVERY 6 HOURS PRN
Status: DISCONTINUED | OUTPATIENT
Start: 2024-02-29 | End: 2024-03-09 | Stop reason: HOSPADM

## 2024-02-29 RX ADMIN — SEVELAMER CARBONATE 1600 MG: 800 TABLET, FILM COATED ORAL at 17:56

## 2024-02-29 RX ADMIN — Medication 250 MG: at 20:47

## 2024-02-29 RX ADMIN — Medication 1000 MG: at 11:19

## 2024-02-29 RX ADMIN — NYSTATIN 500000 UNITS: 100000 SUSPENSION ORAL at 17:56

## 2024-02-29 RX ADMIN — BUDESONIDE 0.5 MG: 0.5 INHALANT RESPIRATORY (INHALATION) at 19:38

## 2024-02-29 RX ADMIN — LEVOTHYROXINE SODIUM 125 MCG: 125 TABLET ORAL at 05:50

## 2024-02-29 RX ADMIN — ACETAMINOPHEN 650 MG: 325 TABLET ORAL at 20:47

## 2024-02-29 RX ADMIN — Medication 5 MG: at 20:47

## 2024-02-29 RX ADMIN — VANCOMYCIN HYDROCHLORIDE 125 MG: 125 CAPSULE ORAL at 17:56

## 2024-02-29 RX ADMIN — GUAIFENESIN 600 MG: 600 TABLET ORAL at 20:47

## 2024-02-29 RX ADMIN — ASPIRIN 81 MG: 81 TABLET, COATED ORAL at 12:42

## 2024-02-29 RX ADMIN — VANCOMYCIN HYDROCHLORIDE 125 MG: 125 CAPSULE ORAL at 12:42

## 2024-02-29 RX ADMIN — CEFEPIME 2000 MG: 2 INJECTION, POWDER, FOR SOLUTION INTRAVENOUS at 21:14

## 2024-02-29 RX ADMIN — GENTAMICIN SULFATE 1 APPLICATION: 1 CREAM TOPICAL at 11:20

## 2024-02-29 RX ADMIN — SODIUM CHLORIDE, SODIUM LACTATE, CALCIUM CHLORIDE, MAGNESIUM CHLORIDE AND DEXTROSE 6000 ML: 1.5; 538; 448; 18.3; 5.08 INJECTION, SOLUTION INTRAPERITONEAL at 22:59

## 2024-02-29 RX ADMIN — VANCOMYCIN HYDROCHLORIDE 1750 MG: 5 INJECTION, POWDER, LYOPHILIZED, FOR SOLUTION INTRAVENOUS at 01:25

## 2024-02-29 RX ADMIN — VANCOMYCIN HYDROCHLORIDE 125 MG: 125 CAPSULE ORAL at 05:50

## 2024-02-29 RX ADMIN — Medication 10 ML: at 20:47

## 2024-02-29 RX ADMIN — NYSTATIN 500000 UNITS: 100000 SUSPENSION ORAL at 20:47

## 2024-02-29 RX ADMIN — Medication 10 ML: at 12:42

## 2024-02-29 RX ADMIN — ARFORMOTEROL TARTRATE 15 MCG: 15 SOLUTION RESPIRATORY (INHALATION) at 19:38

## 2024-02-29 RX ADMIN — CEFEPIME HYDROCHLORIDE 1 G: 1 INJECTION, POWDER, FOR SOLUTION INTRAMUSCULAR; INTRAVENOUS at 00:51

## 2024-02-29 RX ADMIN — Medication 5 MG: at 03:15

## 2024-02-29 RX ADMIN — HEPARIN SODIUM 12 UNITS/KG/HR: 10000 INJECTION, SOLUTION INTRAVENOUS at 04:44

## 2024-02-29 NOTE — CONSULTS
[facility]   Cardiology Consult Note    Patient Name: Nelson Wang  : 1945  MRN: [id number]  Primary Care Physician:  Janna Mo MD  Referring Physician: No Known Provider  Date of admission: 2024    Subjective   Subjective     Reason for Consult/ Chief Complaint: Shortness of breath, fevers chills    HPI:  Nelson Wang is a 79 y.o. male with past medical history significant for end-stage renal disease on peritoneal dialysis, diabetes, hypertension and hyperlipidemia.  He was recently in the hospital for right lower lobe pneumonia likely due to E. coli along with bacteremia and sepsis.  He was just discharged less than a week ago.  He states at home he was having intermittent significant shortness of breath.  He denied chest pain 4 times when I asked him.  He noted a subjective fever and was taking Tylenol.  He does note a cough.  He denies any swelling or weight change.    Review of Systems   All systems were reviewed and negative except for: Shortness of breath, subjective fevers, cough    Personal History     Past Medical History:   Diagnosis Date    Anemia     Ankle pain, right     CKD (chronic kidney disease), stage IV     Diabetes     Gout     High cholesterol     HL (hearing loss)     Hyperkalemia     Hypertension     Hypothyroidism     Psoriasis     Vitiligo    Past medical history reviewed      Family History: family history includes Cancer (age of onset: 35) in his sister; Diabetes in his brother, mother, and sister; Heart disease in his father and paternal uncle. Otherwise pertinent FHx was reviewed and not pertinent to current issue.    Social History:  reports that he quit smoking about 24 years ago. His smoking use included cigarettes. He started smoking about 34 years ago. He has a 10.00 pack-year smoking history. He has been exposed to tobacco smoke. He has never used smokeless tobacco. He reports current alcohol use. He reports that he does not use drugs.    Home  Medications:  Insulin Glargine, Lidocaine (Anorectal), Risankizumab-rzaa, Turmeric, allopurinol, ascorbic acid, atorvastatin, carvedilol, cefTRIAXone, fish oil, levothyroxine, nystatin, riFAXIMin, sertraline, sevelamer, sodium bicarbonate, and torsemide    Allergies:  No Known Allergies    Objective    Objective     Vitals:   Temp:  [98.2 °F (36.8 °C)-99.3 °F (37.4 °C)] 98.2 °F (36.8 °C)  Heart Rate:  [75-94] 94  Resp:  [16-20] 18  BP: (101-156)/(47-91) 101/78  Flow (L/min):  [2-10] 4      Physical Exam:   Constitutional: Awake, alert, No acute distress    Eyes: PERRLA, sclerae anicteric, no conjunctival injection   HENT: NCAT, mucous membranes moist   Neck: Supple, no thyromegaly, no lymphadenopathy, trachea midline   Respiratory: Clear to auscultation bilaterally, nonlabored respirations    Cardiovascular: RRR, no murmurs, rubs, or gallops, palpable pedal pulses bilaterally   Gastrointestinal: Positive bowel sounds, soft, nontender, nondistended   Musculoskeletal: No bilateral ankle edema, no clubbing or cyanosis to extremities   Psychiatric: Appropriate affect, cooperative   Neurologic: Oriented x 3, strength symmetric in all extremities, Cranial Nerves grossly intact to confrontation, speech clear   Skin: No rashes     Result Review    Result Review:  I have personally reviewed the results from the time of this admission to 2/29/2024 08:46 EST and agree with these findings:  [x]  Laboratory  []  Microbiology  [x]  Radiology  [x]  EKG/Telemetry   [x]  Cardiology/Vascular   []  Pathology  [x]  Old records  []  Other:  Most notable findings include:     CMP          2/23/2024    03:42 2/23/2024    18:42 2/24/2024    06:34 2/28/2024    22:54   CMP   Glucose 205  187  192  164    BUN 28  30  28  22    Creatinine 4.92  4.82  5.19  6.04    EGFR 11.4  11.7  10.7  8.9    Sodium 136  139  138  139    Potassium 3.9  3.5  3.5  4.1    Chloride 99  102  102  99    Calcium 8.5  8.7  8.7  8.3    Total Protein 5.9  5.9  5.7  5.9     Albumin 2.5  2.6  2.4  2.6    Globulin  3.3   3.3    Total Bilirubin 0.2  0.2  0.2  0.3    Alkaline Phosphatase 55  59  53  65    AST (SGOT) 25  23  23  34    ALT (SGPT) 15  14  14  13    Albumin/Globulin Ratio  0.8   0.8    BUN/Creatinine Ratio 5.7  6.2  5.4  3.6    Anion Gap 12.7  11.7  10.5  11.3       CBC          2/23/2024    04:20 2/23/2024    18:42 2/24/2024    06:34 2/28/2024    21:50   CBC   WBC 8.38  7.86  8.74  10.09    RBC 2.50  2.89  2.85  3.61    Hemoglobin 7.6  8.8  8.7  10.9    Hematocrit 24.6  28.5  27.8  34.8    MCV 98.4  98.6  97.5  96.4    MCH 30.4  30.4  30.5  30.2    MCHC 30.9  30.9  31.3  31.3    RDW 16.0  16.2  16.3  17.7    Platelets 132  150  155  230       Lab Results   Component Value Date    TROPONINT 428 (C) 02/29/2024         Assessment & Plan   Assessment / Plan     Brief Patient Summary:  Nelson Wang is a 79 y.o. male who has a history of end-stage renal disease on peritoneal dialysis, diabetes, hypertension and hyperlipidemia.  He was in the hospital less than a week ago with shortness of breath thought due to apparent E. coli pneumonia, bacteremia and sepsis.  He presents with shortness of breath, subjective fevers and cough.  I asked him for different times about chest pain and he denied having any chest pain.    Active Hospital Problems:  Active Hospital Problems    Diagnosis     **Acute on chronic respiratory failure with hypoxia     HCAP (healthcare-associated pneumonia)     ESRD (end stage renal disease)     Peritoneal dialysis catheter in place     Essential hypertension     Anemia due to chronic kidney disease     Type 2 diabetes mellitus without complication        Assessment:  1.  Elevated troponins unsure if type I or type II non-STEMI  2.  Subjective fevers, cough  3.  Hypertension  4.  Hyperlipidemia  5.  End-stage renal disease on peritoneal dialysis.    Plan:   1.  Patient's CAT scan is being read as bilateral pneumonia on the right side greater than the left  side.  He also is noted to have small pleural effusions.  He is being treated with vancomycin and cefepime.  He was just discharged with pneumonia, bacteremia and sepsis.  2.  Patient just recently had an echocardiogram 10 days ago which showed normal ejection fraction and no significant valvular disease.  His beta natruretic peptide is elevated but this is not reliable in the setting of end-stage renal disease.  He notes no lower extremity edema or weight changes.  3.  I asked the patient for different times about chest pain and each time he denied having any chest pain.  His cardiac enzymes did increase from 196-482.  We will continue the heparin drip and I will add aspirin.  At some point we are going to have to consider an ischemic workup.  Would see if his shortness of breath improves on the antibiotics first and continue to watch him closely.  4.  Continue the statin.    Electronically signed by Stephan Nichols MD, 02/29/24, 8:46 AM EST.

## 2024-02-29 NOTE — THERAPY EVALUATION
Patient Name: Nelson Wang  : 1945    MRN: [id number]                              Today's Date: 2024       Admit Date: 2024    Visit Dx:     ICD-10-CM ICD-9-CM   1. Acute respiratory failure with hypoxia  J96.01 518.81   2. Multifocal pneumonia  J18.9 486   3. Difficulty in walking  R26.2 719.7   4. Decreased activities of daily living (ADL)  Z78.9 V49.89     Patient Active Problem List   Diagnosis    Essential hypertension    Bradycardia, sinus    Anemia due to chronic kidney disease    Hypothyroidism    Idiopathic gout    Proteinuria    Psoriasis    Thrombocytopenia    Type 2 diabetes mellitus without complication    CKD (chronic kidney disease), stage IV    Hyperlipidemia    Monoclonal gammopathy of unknown significance (MGUS)    Stage 5 chronic kidney disease    Peritoneal dialysis catheter in place    Chronic gout without tophus    Peritoneal dialysis catheter dysfunction    Medicare annual wellness visit, subsequent    Chronic cough    Peritonitis    HCAP (healthcare-associated pneumonia)    Acute on chronic respiratory failure with hypoxia    ESRD (end stage renal disease)     Past Medical History:   Diagnosis Date    Anemia     Ankle pain, right     CKD (chronic kidney disease), stage IV     Diabetes     Gout     High cholesterol     HL (hearing loss)     Hyperkalemia     Hypertension     Hypothyroidism     Psoriasis     Vitiligo      Past Surgical History:   Procedure Laterality Date    ANKLE SURGERY  1990    APPENDECTOMY N/A     COLONOSCOPY N/A 2022    [facility]    HIP BIPOLAR REPLACEMENT Right 2000    INSERTION PERITONEAL DIALYSIS CATHETER N/A 3/27/2023    Procedure: LAPAROSCOPIC INSERTION PERITONEAL DIALYSIS CATHETER, LAPAROSCOPIC OMENTOPEXY WITH LYSIS OF ADHESIONS;  Surgeon: Jose Berry MD;  Location: [facility];  Service: General;  Laterality: N/A;    INSERTION PERITONEAL DIALYSIS CATHETER Left 2023    Procedure: REVISION OF PERITONEAL  DIALYSIS CATHETER;  Surgeon: Radha Oreilly MD;  Location: [facility] MAIN OR;  Service: General;  Laterality: Left;    RENAL BIOPSY Left 07/15/2022      General Information       Row Name 02/29/24 1115          OT Time and Intention    Document Type evaluation (P)   -     Mode of Treatment individual therapy;occupational therapy (P)   -       Row Name 02/29/24 1115          General Information    Patient Profile Reviewed yes (P)   -     Prior Level of Function -- (P)   (I) with ADLs, ambulated without an assistive device. Stands to shower, has a showerchair available if needed (walk-in shower). Has both standard and elevated toilet. Usually stands to groom but sits if needed. Drives, and no home O2.  -     Existing Precautions/Restrictions no known precautions/restrictions (P)   -     Barriers to Rehab none identified (P)   -       Row Name 02/29/24 1115          Occupational Profile    Reason for Services/Referral (Occupational Profile) Patient is a 79 year old male admitted to [facility] on February 28th, 2024 for shortness of breath. Occupational Therapy consulted due to ADL performance being below baseline. (P)   -       Row Name 02/29/24 1115          Living Environment    People in Home spouse;child(laura), adult (P)   -       Row Name 02/29/24 1115          Home Main Entrance    Number of Stairs, Main Entrance two (P)   -     Stair Railings, Main Entrance none (P)   -       Row Name 02/29/24 1115          Cognition    Orientation Status (Cognition) oriented to;person (P)   -       Row Name 02/29/24 1115          Safety Issues, Functional Mobility    Impairments Affecting Function (Mobility) endurance/activity tolerance;shortness of breath;balance (P)   -               User Key  (r) = Recorded By, (t) = Taken By, (c) = Cosigned By      Initials Name Provider Type     Ciro Guzman, OT Student OT Student                     Mobility/ADL's       Row Name 02/29/24 1124           Bed Mobility    Bed Mobility supine-sit;sit-supine (P)   -     Supine-Sit [place] (Bed Mobility) independent (P)   -     Sit-Supine [place] (Bed Mobility) independent (P)   -     Assistive Device (Bed Mobility) bed rails (P)   -[facility] Name 02/29/24 1124          Transfers    Transfers sit-stand transfer;stand-sit transfer (P)   -[facility] Name 02/29/24 1124          Sit-Stand Transfer    Sit-Stand [place] (Transfers) supervision (P)   -     Assistive Device (Sit-Stand Transfers) walker, front-wheeled (P)   -[facility] Name 02/29/24 1124          Stand-Sit Transfer    Stand-Sit [place] (Transfers) supervision (P)   -     Assistive Device (Stand-Sit Transfers) walker, front-wheeled (P)   -[facility] Name 02/29/24 1124          Activities of Daily Living    BADL Assessment/Intervention bathing;upper body dressing;lower body dressing;grooming;toileting;feeding (P)   -MC       Row Name 02/29/24 1124          Bathing Assessment/Intervention    [place] Level (Bathing) bathing skills;lower body;standby assist;upper body;set up (P)   -[facility] Name 02/29/24 1124          Upper Body Dressing Assessment/Training    [place] Level (Upper Body Dressing) upper body dressing skills;set up (P)   -[facility] Name 02/29/24 1124          Lower Body Dressing Assessment/Training    [place] Level (Lower Body Dressing) lower body dressing skills;standby assist (P)   -[facility] 02/29/24 1124          Grooming Assessment/Training    [place] Level (Grooming) grooming skills;set up (P)   -MC       Row Name 02/29/24 1124          Toileting Assessment/Training    [place] Level (Toileting) toileting skills;standby assist (P)   -[facility] Name 02/29/24 1124          Self-Feeding Assessment/Training    [place] Level (Feeding) feeding skills;set up (P)   -               User Key  (r) = Recorded By, (t) = Taken By, (c) = Cosigned By      Initials Name  Provider Type    Ciro Ly OT Student OT Student                   Obj/Interventions       Row Name 02/29/24 1128          Sensory Assessment (Somatosensory)    Sensory Assessment (Somatosensory) UE sensation intact (P)   -MC       Row Name 02/29/24 1128          Vision Assessment/Intervention    Visual Impairment/Limitations WFL (P)   -MC       Row Name 02/29/24 1128          Range of Motion Comprehensive    General Range of Motion bilateral upper extremity ROM WFL (P)   -MC       Row Name 02/29/24 1128          Strength Comprehensive (MMT)    General Manual Muscle Testing (MMT) Assessment no strength deficits identified (P)   -     Comment, General Manual Muscle Testing (MMT) Assessment BUE 5/5 (P)   -[facility] 02/29/24 1128          Motor Skills    Motor Skills coordination;functional endurance (P)   -     Coordination WFL (P)   -     Functional Endurance Fair (P)   -[facility] 02/29/24 1128          Balance    Balance Assessment sitting dynamic balance;standing dynamic balance (P)   -     Dynamic Sitting Balance independent (P)   -     Position, Sitting Balance unsupported;sitting edge of bed (P)   -     Dynamic Standing Balance standby assist (P)   -     Position/Device Used, Standing Balance supported;walker, front-wheeled (P)   -               User Key  (r) = Recorded By, (t) = Taken By, (c) = Cosigned By      Initials Name Provider Type    Ciro Ly, OT Student OT Student                   Goals/Plan       Row Name 02/29/24 1150          Transfer Goal 1 (OT)    Activity/Assistive Device (Transfer Goal 1, OT) transfers, all (P)   -     [place] Level/Cues Needed (Transfer Goal 1, OT) independent (P)   -     Time Frame (Transfer Goal 1, OT) long term goal (LTG);10 days (P)   -MC       Row Name 02/29/24 1150          Bathing Goal 1 (OT)    Activity/Device (Bathing Goal 1, OT) bathing skills, all (P)   -     [place] Level/Cues Needed (Bathing Goal  1, OT) independent (P)   -MC     Time Frame (Bathing Goal 1, OT) long term goal (LTG);10 days (P)   -       Row Name 02/29/24 1150          Dressing Goal 1 (OT)    Activity/Device (Dressing Goal 1, OT) dressing skills, all (P)   -MC     [place]/Cues Needed (Dressing Goal 1, OT) independent (P)   -MC     Time Frame (Dressing Goal 1, OT) long term goal (LTG);10 days (P)   -       Row Name 02/29/24 1150          Toileting Goal 1 (OT)    Activity/Device (Toileting Goal 1, OT) toileting skills, all (P)   -     [place] Level/Cues Needed (Toileting Goal 1, OT) independent (P)   -MC     Time Frame (Toileting Goal 1, OT) long term goal (LTG);10 days (P)   -       Row Name 02/29/24 1150          Problem Specific Goal 1 (OT)    Problem Specific Goal 1 (OT) Patient will demonstrate good- endurance to support independence in ADLs. (P)   -     Time Frame (Problem Specific Goal 1, OT) long term goal (LTG);10 days (P)   -MC       Row Name 02/29/24 1150          Therapy Assessment/Plan (OT)    Planned Therapy Interventions (OT) activity tolerance training;BADL retraining;functional balance retraining;occupation/activity based interventions;patient/caregiver education/training;strengthening exercise;transfer/mobility retraining (P)   -               User Key  (r) = Recorded By, (t) = Taken By, (c) = Cosigned By      Initials Name Provider Type     Ciro Guzman, OT Student OT Student                   Clinical Impression       Row Name 02/29/24 1140          Pain Assessment    Additional Documentation Pain Scale: FACES Pre/Post-Treatment (Group) (P)   -MC       Row Name 02/29/24 1140          Pain Scale: FACES Pre/Post-Treatment    Pain: FACES Scale, Pretreatment 0-->no hurt (P)   -     Posttreatment Pain Rating 0-->no hurt (P)   -[facility] 02/29/24 1140          Plan of Care Review    Plan of Care Reviewed With patient (P)   -     Progress no change (P)   -     Outcome Evaluation Patient  presents to occupational therapy with deficits in self-care, functional endurance, and balance. He would benefit from continued skilled OT services to maximize independence. (P)   -       Row Name 02/29/24 1140          Therapy Assessment/Plan (OT)    Patient/Family Therapy Goal Statement (OT) Maximize independence (P)   -     Rehab Potential (OT) good, to achieve stated therapy goals (P)   -     Criteria for Skilled Therapeutic Interventions Met (OT) yes;meets criteria;skilled treatment is necessary (P)   -     Therapy Frequency (OT) 5 times/wk (P)   -       Row Name 02/29/24 1140          Therapy Plan Review/Discharge Plan (OT)    Anticipated Discharge Disposition (OT) home with home health;home with assist (P)   -       Row Name 02/29/24 1140          Vital Signs    O2 Delivery Pre Treatment nasal cannula (P)   -     O2 Delivery Intra Treatment nasal cannula (P)   -     O2 Delivery Post Treatment nasal cannula (P)   -       Row Name 02/29/24 1140          Positioning and Restraints    Pre-Treatment Position in bed (P)   -     Post Treatment Position bed (P)   -MC     In Bed side lying left;call light within reach;encouraged to call for assist (P)   -               User Key  (r) = Recorded By, (t) = Taken By, (c) = Cosigned By      Initials Name Provider Type     Ciro Guzman, OT Student OT Student                   Outcome Measures       Row Name 02/29/24 1151          How much help from another is currently needed...    Putting on and taking off regular lower body clothing? 3 (P)   -MC     Bathing (including washing, rinsing, and drying) 3 (P)   -MC     Toileting (which includes using toilet bed pan or urinal) 3 (P)   -MC     Putting on and taking off regular upper body clothing 4 (P)   -MC     Taking care of personal grooming (such as brushing teeth) 4 (P)   -MC     Eating meals 4 (P)   -     AM-PAC 6 Clicks Score (OT) 21 (P)   -       Row Name 02/29/24 1100 02/29/24 2532        How much help from another person do you currently need...    Turning from your back to your side while in flat bed without using bedrails? 4 (P)   -NM 4  -KS    Moving from lying on back to sitting on the side of a flat bed without bedrails? 4 (P)   -NM 3  -KS    Moving to and from a bed to a chair (including a wheelchair)? 3 (P)   -NM 3  -KS    Standing up from a chair using your arms (e.g., wheelchair, bedside chair)? 4 (P)   -NM 3  -KS    Climbing 3-5 steps with a railing? 3 (P)   -NM 3  -KS    To walk in hospital room? 4 (P)   -NM 3  -KS    AM-PAC 6 Clicks Score (PT) 22 (P)   -NM 19  -KS    Highest Level of Mobility Goal 7 --> Walk 25 feet or more (P)   -NM 6 --> Walk 10 steps or more  -KS      Row Name 02/29/24 1151          Functional Assessment    Outcome Measure Options AM-PAC 6 Clicks Daily Activity (OT);Optimal Instrument (P)   -       Row Name 02/29/24 1151          Optimal Instrument    Optimal Instrument Optimal - 3 (P)   -     Bending/Stooping 1 (P)   -     Standing 1 (P)   -     Reaching 1 (P)   -     From the list, choose the 3 activities you would most like to be able to do without any difficulty Bending/stooping;Standing;Reaching (P)   -     Total Score Optimal - 3 3 (P)   -               User Key  (r) = Recorded By, (t) = Taken By, (c) = Cosigned By      Initials Name Provider Type    Susan Damon, RN Registered Nurse    Ciro Ly, OT Student OT Student    Jeff Momin, PT Student PT Student                    Occupational Therapy Education       Title: PT OT SLP Therapies (Done)       Topic: Occupational Therapy (Done)       Point: ADL training (Done)       Description:   Instruct learner(s) on proper safety adaptation and remediation techniques during self care or transfers.   Instruct in proper use of assistive devices.                  Learning Progress Summary             Patient Acceptance, E, VU by  at 2/29/2024 1152                         Point:  Precautions (Done)       Description:   Instruct learner(s) on prescribed precautions during self-care and functional transfers.                  Learning Progress Summary             Patient Acceptance, E, VU by  at 2/29/2024 1152                         Point: Body mechanics (Done)       Description:   Instruct learner(s) on proper positioning and spine alignment during self-care, functional mobility activities and/or exercises.                  Learning Progress Summary             Patient Acceptance, E, VU by  at 2/29/2024 1152                                         User Key       Initials Effective Dates Name Provider Type Discipline     01/10/24 -  Ciro Guzman OT Student OT Student OT                  OT Recommendation and Plan  Planned Therapy Interventions (OT): (P) activity tolerance training, BADL retraining, functional balance retraining, occupation/activity based interventions, patient/caregiver education/training, strengthening exercise, transfer/mobility retraining  Therapy Frequency (OT): (P) 5 times/wk  Plan of Care Review  Plan of Care Reviewed With: (P) patient  Progress: (P) no change  Outcome Evaluation: (P) Patient presents to occupational therapy with deficits in self-care, functional endurance, and balance. He would benefit from continued skilled OT services to maximize independence.     Time Calculation:   Evaluation Complexity (OT)  Review Occupational Profile/Medical/Therapy History Complexity: (P) brief/low complexity  Assessment, Occupational Performance/Identification of Deficit Complexity: (P) 3-5 performance deficits  Clinical Decision Making Complexity (OT): (P) problem focused assessment/low complexity  Overall Complexity of Evaluation (OT): (P) low complexity     Time Calculation- OT       Row Name 02/29/24 1153             Time Calculation- OT    OT Received On 02/29/24 (P)   -      OT Goal Re-Cert Due Date 03/09/24 (P)   -         Untimed Charges    OT Eval/Re-eval  Minutes 39 (P)   -MC         Total Minutes    Untimed Charges Total Minutes 39 (P)   -MC       Total Minutes 39 (P)   -MC                User Key  (r) = Recorded By, (t) = Taken By, (c) = Cosigned By      Initials Name Provider Type    Ciro Ly OT Student OT Student                  Therapy Charges for Today       Code Description Service Date Service Provider Modifiers Qty    [id number] HC OT EVAL LOW COMPLEXITY 3 2/29/2024 Ciro Guzman OT Micky GO 1                 JOCELYN Ray  2/29/2024

## 2024-02-29 NOTE — THERAPY EVALUATION
Acute Care - Physical Therapy Initial Evaluation   Nate     Patient Name: Nelson Wang  : 1945  MRN: [id number]  Today's Date: 2024      Visit Dx:     ICD-10-CM ICD-9-CM   1. Acute respiratory failure with hypoxia  J96.01 518.81   2. Multifocal pneumonia  J18.9 486   3. Difficulty in walking  R26.2 719.7     Patient Active Problem List   Diagnosis    Essential hypertension    Bradycardia, sinus    Anemia due to chronic kidney disease    Hypothyroidism    Idiopathic gout    Proteinuria    Psoriasis    Thrombocytopenia    Type 2 diabetes mellitus without complication    CKD (chronic kidney disease), stage IV    Hyperlipidemia    Monoclonal gammopathy of unknown significance (MGUS)    Stage 5 chronic kidney disease    Peritoneal dialysis catheter in place    Chronic gout without tophus    Peritoneal dialysis catheter dysfunction    Medicare annual wellness visit, subsequent    Chronic cough    Peritonitis    HCAP (healthcare-associated pneumonia)    Acute on chronic respiratory failure with hypoxia    ESRD (end stage renal disease)     Past Medical History:   Diagnosis Date    Anemia     Ankle pain, right     CKD (chronic kidney disease), stage IV     Diabetes     Gout     High cholesterol     HL (hearing loss)     Hyperkalemia     Hypertension     Hypothyroidism     Psoriasis     Vitiligo      Past Surgical History:   Procedure Laterality Date    ANKLE SURGERY  1990    APPENDECTOMY N/A     COLONOSCOPY N/A 2022    [facility]    HIP BIPOLAR REPLACEMENT Right 2000    INSERTION PERITONEAL DIALYSIS CATHETER N/A 3/27/2023    Procedure: LAPAROSCOPIC INSERTION PERITONEAL DIALYSIS CATHETER, LAPAROSCOPIC OMENTOPEXY WITH LYSIS OF ADHESIONS;  Surgeon: Jose Berry MD;  Location: [facility];  Service: General;  Laterality: N/A;    INSERTION PERITONEAL DIALYSIS CATHETER Left 2023    Procedure: REVISION OF PERITONEAL DIALYSIS CATHETER;  Surgeon: Radha Oreilly MD;   Location: [facility] MAIN OR;  Service: General;  Laterality: Left;    RENAL BIOPSY Left 07/15/2022     PT Assessment (last 12 hours)       PT Evaluation and Treatment       Row Name 02/29/24 1102          Physical Therapy Time and Intention    Subjective Information no complaints (P)   -NM     Document Type evaluation (P)   -NM     Mode of Treatment individual therapy;physical therapy (P)   -NM     Patient Effort excellent (P)   -NM     Symptoms Noted During/After Treatment none (P)   -NM       Row Name 02/29/24 1102          General Information    Patient Profile Reviewed yes (P)   -NM     Patient Observations alert;cooperative;agree to therapy (P)   -NM     Prior Level of Function independent: (P)   -NM     Equipment Currently Used at Home none (P)   -NM     Existing Precautions/Restrictions no known precautions/restrictions (P)   -NM     Barriers to Rehab none identified (P)   -NM       Row Name 02/29/24 1102          Living Environment    Current Living Arrangements home (P)   -NM     Home Accessibility stairs to enter home (P)   -NM     People in Home child(laura), adult;spouse (P)   -NM       Row Name 02/29/24 1102          Home Main Entrance    Number of Stairs, Main Entrance four (P)   -NM     Stair Railings, Main Entrance none (P)   -NM       Row Name 02/29/24 1102          Home Use of Assistive/Adaptive Equipment    Equipment Currently Used at Home none (P)   -NM       Row Name 02/29/24 1102          Pain    Pretreatment Pain Rating 0/10 - no pain (P)   -NM     Posttreatment Pain Rating 0/10 - no pain (P)   -NM       Row Name 02/29/24 1102          Range of Motion (ROM)    Range of Motion bilateral lower extremities;ROM is WFL (P)   -NM       Row Name 02/29/24 1102          Strength (Manual Muscle Testing)    Strength (Manual Muscle Testing) bilateral lower extremities (P)   4+/5  -NM       [facility] Name 02/29/24 1102          Bed Mobility    Bed Mobility bed mobility (all) activities (P)   -NM     All Activities,  [place] (Bed Mobility) independent (P)   -NM       Row Name 02/29/24 1102          Transfers    Transfers sit-stand transfer;stand-sit transfer;stand pivot/stand step transfer (P)   -NM       Row Name 02/29/24 1102          Sit-Stand Transfer    Sit-Stand [place] (Transfers) standby assist (P)   -NM       Row Name 02/29/24 1102          Stand-Sit Transfer    Stand-Sit [place] (Transfers) standby assist (P)   -NM       Row Name 02/29/24 1102          Stand Pivot/Stand Step Transfer    Stand Pivot/Stand Step [place] (Transfers) standby assist (P)   -NM       Row Name 02/29/24 1102          Gait/Stairs (Locomotion)    Gait/Stairs Locomotion gait/ambulation independence (P)   -NM     [place] Level (Gait) standby assist (P)   -NM     Patient was able to Ambulate yes (P)   -NM     Distance in Feet (Gait) 20ft (P)   -NM     Pattern (Gait) step-through (P)   -NM       Row Name 02/29/24 1102          Safety Issues, Functional Mobility    Impairments Affecting Function (Mobility) endurance/activity tolerance (P)   -NM       Row Name 02/29/24 1102          Balance    Balance Assessment standing dynamic balance (P)   -NM     Dynamic Standing Balance standby assist (P)   -NM       Row Name 02/29/24 1102          Plan of Care Review    Plan of Care Reviewed With patient (P)   -NM     Progress improving (P)   -NM     Outcome Evaluation Pt presents to therapy today with ability to transfer and ambulate safely. Pt is safe to d/c home when appropriate. (P)   -NM       Row Name 02/29/24 1102          Positioning and Restraints    Pre-Treatment Position in bed (P)   -NM     Post Treatment Position bed (P)   -NM     In Bed side lying left;call light within reach;encouraged to call for assist;exit alarm on (P)   -NM       Row Name 02/29/24 1102          Therapy Assessment/Plan (PT)    Therapy Frequency (PT) evaluation only (P)   -NM       Row Name 02/29/24 1102          PT Evaluation Complexity    History, PT  Evaluation Complexity 3 or more personal factors and/or comorbidities (P)   -NM     Examination of Body Systems (PT Eval Complexity) total of 4 or more elements (P)   -NM     Clinical Presentation (PT Evaluation Complexity) stable (P)   -NM     Clinical Decision Making (PT Evaluation Complexity) low complexity (P)   -NM     Overall Complexity (PT Evaluation Complexity) low complexity (P)   -NM       Row Name 02/29/24 1102          Therapy Plan Review/Discharge Plan (PT)    Therapy Plan Review (PT) evaluation/treatment results reviewed;care plan/treatment goals reviewed (P)   -NM               User Key  (r) = Recorded By, (t) = Taken By, (c) = Cosigned By      Initials Name Provider Type    NM Jeff Paz, PT Student PT Student                    Physical Therapy Education       Title: PT OT SLP Therapies (Done)       Topic: Physical Therapy (Done)       Point: Mobility training (Done)       Learning Progress Summary             Patient Acceptance, E,TB, VU by NM at 2/29/2024 1107                                         User Key       Initials Effective Dates Name Provider Type Discipline    NM 01/31/24 -  Jeff Paz, PT Student PT Student PT                  PT Recommendation and Plan  Anticipated Discharge Disposition (PT): (P) home  Therapy Frequency (PT): (P) evaluation only  Plan of Care Reviewed With: (P) patient  Progress: (P) improving  Outcome Evaluation: (P) Pt presents to therapy today with ability to transfer and ambulate safely. Pt is safe to d/c home when appropriate.   Outcome Measures       Row Name 02/29/24 1100             How much help from another person do you currently need...    Turning from your back to your side while in flat bed without using bedrails? 4 (P)   -NM      Moving from lying on back to sitting on the side of a flat bed without bedrails? 4 (P)   -NM      Moving to and from a bed to a chair (including a wheelchair)? 3 (P)   -NM      Standing up from a chair using  your arms (e.g., wheelchair, bedside chair)? 4 (P)   -NM      Climbing 3-5 steps with a railing? 3 (P)   -NM      To walk in hospital room? 4 (P)   -NM      AM-PAC 6 Clicks Score (PT) 22 (P)   -NM      Highest Level of Mobility Goal 7 --> Walk 25 feet or more (P)   -NM                User Key  (r) = Recorded By, (t) = Taken By, (c) = Cosigned By      Initials Name Provider Type    Jeff Momin, PT Student PT Student                     Time Calculation:    PT Charges       Row Name 02/29/24 1101             Time Calculation    PT Received On 02/29/24 (P)   -NM      PT Goal Re-Cert Due Date 03/09/24 (P)   -NM         Untimed Charges    PT Eval/Re-eval Minutes 25 (P)   -NM         Total Minutes    Untimed Charges Total Minutes 25 (P)   -NM       Total Minutes 25 (P)   -NM                User Key  (r) = Recorded By, (t) = Taken By, (c) = Cosigned By      Initials Name Provider Type    Jeff Momin, PT Student PT Student                  Therapy Charges for Today       Code Description Service Date Service Provider Modifiers Qty    55680566331 HC PT EVAL LOW COMPLEXITY 2 2/29/2024 Jeff Paz, PT Student GP 1            PT G-Codes  AM-PAC 6 Clicks Score (PT): (P) 22    Jeff Paz PT Student  2/29/2024

## 2024-02-29 NOTE — H&P
[facility]   HISTORY AND PHYSICAL    Patient Name: Nelson Wang  : 1945  MRN: [id number]  Primary Care Physician:  Janna Mo MD  Date of admission: 2024    Subjective   Subjective     Chief Complaint: Shortness of breath    HPI:    Nelson Wang is a 79 y.o. male with past medical history of diabetes, end-stage renal disease on peritoneal dialysis, immunosuppression on Skyrizi, hypertension, hyperlipidemia, hearing impairment, CAD, vertigo, GERD presents to the ED with shortness of breath.  Patient was discharged from this hospital 5 days ago to inpatient treatment of E. coli bacteremia with septic shock, C. difficile colitis, peritonitis and pneumonia included ICU management.  Since his discharge patient has been mostly bedbound and lethargic due to generalized weakness and shortness of breath.  Patient's daughter (Dr. Garcia) was concerned so she brought him to the ED for further evaluation.  In the ED patient was hypoxic on arrival requiring oxygen supplementation via nasal cannula.  Labs showed that he had a mildly elevated lactic acid with significantly elevated proBNP and troponin levels.  ABG was relatively unremarkable on nasal cannula.  Repeat hemoglobin had a significant delta of +232.  Patient denied any chest pain.  CT of the chest showed worsening progression of multifocal pneumonia much greater on the right and new small pleural effusions.  Patient denies any fevers, chills, chest pain, nausea, or vomiting.  Patient did have diarrhea that has been improving over the last few days.  Patient was admitted for further evaluation and treatment.    Review of Systems   All systems were reviewed and negative except for: As per HPI    Personal History     Past Medical History:   Diagnosis Date    Anemia     Ankle pain, right     CKD (chronic kidney disease), stage IV     Diabetes     Gout     High cholesterol     HL (hearing loss)     Hyperkalemia     Hypertension      Hypothyroidism     Psoriasis     Vitiligo        Past Surgical History:   Procedure Laterality Date    ANKLE SURGERY  11/1990    APPENDECTOMY N/A 1954    COLONOSCOPY N/A 06/2022    [facility]    HIP BIPOLAR REPLACEMENT Right 01/2000    INSERTION PERITONEAL DIALYSIS CATHETER N/A 3/27/2023    Procedure: LAPAROSCOPIC INSERTION PERITONEAL DIALYSIS CATHETER, LAPAROSCOPIC OMENTOPEXY WITH LYSIS OF ADHESIONS;  Surgeon: Jose Berry MD;  Location: [facility] OR;  Service: General;  Laterality: N/A;    INSERTION PERITONEAL DIALYSIS CATHETER Left 7/23/2023    Procedure: REVISION OF PERITONEAL DIALYSIS CATHETER;  Surgeon: Radha Oreilly MD;  Location: [facility] OR;  Service: General;  Laterality: Left;    RENAL BIOPSY Left 07/15/2022       Family History: family history includes Cancer (age of onset: 35) in his sister; Diabetes in his brother, mother, and sister; Heart disease in his father and paternal uncle. Otherwise pertinent FHx was reviewed and not pertinent to current issue.    Social History:  reports that he quit smoking about 24 years ago. His smoking use included cigarettes. He started smoking about 34 years ago. He has a 10.00 pack-year smoking history. He has been exposed to tobacco smoke. He has never used smokeless tobacco. He reports current alcohol use. He reports that he does not use drugs.    Home Medications:  Insulin Glargine, Lidocaine (Anorectal), Risankizumab-rzaa, Turmeric, allopurinol, ascorbic acid, atorvastatin, carvedilol, cefTRIAXone, fish oil, levothyroxine, nystatin, riFAXIMin, sertraline, sevelamer, sodium bicarbonate, and torsemide      Allergies:  No Known Allergies    Objective   Objective     Vitals:   Temp:  [99.1 °F (37.3 °C)-99.3 °F (37.4 °C)] 99.1 °F (37.3 °C)  Heart Rate:  [75-85] 75  Resp:  [16-20] 16  BP: (114-156)/(47-91) 140/47  Flow (L/min):  [2-10] 3  Physical Exam    Constitutional: Awake, alert, lethargic   Eyes: PERRLA, sclerae anicteric, no conjunctival  injection   HENT: NCAT, mucous membranes moist   Neck: Supple, no thyromegaly, no lymphadenopathy, trachea midline   Respiratory: Bilateral rhonchi, on nasal cannula   Cardiovascular: RRR, systolic murmur rubs, or gallops, palpable pedal pulses bilaterally   Gastrointestinal: Positive bowel sounds, soft, distention, nontender   Musculoskeletal: No bilateral ankle edema, no clubbing or cyanosis to extremities   Psychiatric: Appropriate affect, cooperative   Neurologic: Strength symmetric in all extremities, Cranial Nerves grossly intact to confrontation, speech clear   Skin: No rashes     Result Review    Result Review:  I have personally reviewed the results from the time of this admission to 2/29/2024 04:52 EST and agree with these findings:  [x]  Laboratory list / accordion  []  Microbiology  [x]  Radiology  [x]  EKG/Telemetry   []  Cardiology/Vascular   []  Pathology  []  Old records  []  Other:  Most notable findings include: Elevated troponin with a positive delta, significant elevated proBNP level, findings of ESRD, CT chest with multifocal pneumonia, ABG unremarkable, COVID flu RSV negative.      Assessment & Plan   Assessment / Plan     Brief Patient Summary:  Nelson Wang is a 79 y.o. male with past medical history of diabetes, end-stage renal disease on peritoneal dialysis, immunosuppression on Skyrizi, hypertension, hyperlipidemia, hearing impairment, CAD, vertigo, GERD presents to the ED with shortness of breath    Active Hospital Problems:  Active Hospital Problems    Diagnosis     **Acute on chronic respiratory failure with hypoxia     HCAP (healthcare-associated pneumonia)     ESRD (end stage renal disease)     Peritoneal dialysis catheter in place     Essential hypertension     Anemia due to chronic kidney disease     Type 2 diabetes mellitus without complication      Plan:     Acute on chronic respiratory failure  -Admit to telemetry  -Secondary to multifocal pneumonia/HCAP  -Patient with elevated  proBNP and troponin levels  -ABG reviewed  -CT chest reviewed  -Empiric antibiotics with cefepime and Vanco  -Supplemental oxygen as needed  -Blood and sputum cultures  -Pulmonology consulted  -May benefit from bronch  -Will follow along    Elevated troponin  -Initial troponin elevated with significant delta  -Likely secondary to demand but given the upward trend we will start heparin drip for now  -EKG reviewed  -Cardiology consulted  -Further workup per clinical course    End-stage renal disease  -Patient on peritoneal dialysis  -Consult nephrology    Diabetes  -Insulin sliding scale  -Titrate as needed    HTN  -Currently well controlled  -PRN BP meds  -Resume home meds when available  -Titrate if needed    GI ppx  DVT ppx      CODE STATUS:    Level Of Support Discussed With: Patient  Code Status (Patient has no pulse and is not breathing): CPR (Attempt to Resuscitate)  Medical Interventions (Patient has pulse or is breathing): Full Support    Admission Status:  I believe this patient meets inpatient status.      Electronically signed by Andrew Rudd MD, 02/29/24, 4:52 AM EST.

## 2024-02-29 NOTE — THERAPY EVALUATION
Acute Care - Speech Language Pathology   Swallow Initial Evaluation  Nate     Patient Name: Nelson Wang  : 1945  MRN: [id number]  Today's Date: 2024               Admit Date: 2024    Visit Dx:     ICD-10-CM ICD-9-CM   1. Acute respiratory failure with hypoxia  J96.01 518.81   2. Multifocal pneumonia  J18.9 486   3. Difficulty in walking  R26.2 719.7   4. Decreased activities of daily living (ADL)  Z78.9 V49.89   5. Oropharyngeal dysphagia  R13.12 787.22     Patient Active Problem List   Diagnosis    Essential hypertension    Bradycardia, sinus    Anemia due to chronic kidney disease    Hypothyroidism    Idiopathic gout    Proteinuria    Psoriasis    Thrombocytopenia    Type 2 diabetes mellitus without complication    CKD (chronic kidney disease), stage IV    Hyperlipidemia    Monoclonal gammopathy of unknown significance (MGUS)    Stage 5 chronic kidney disease    Peritoneal dialysis catheter in place    Chronic gout without tophus    Peritoneal dialysis catheter dysfunction    Medicare annual wellness visit, subsequent    Chronic cough    Peritonitis    HCAP (healthcare-associated pneumonia)    Acute on chronic respiratory failure with hypoxia    ESRD (end stage renal disease)     Past Medical History:   Diagnosis Date    Anemia     Ankle pain, right     CKD (chronic kidney disease), stage IV     Diabetes     Gout     High cholesterol     HL (hearing loss)     Hyperkalemia     Hypertension     Hypothyroidism     Psoriasis     Vitiligo      Past Surgical History:   Procedure Laterality Date    ANKLE SURGERY  1990    APPENDECTOMY N/A     COLONOSCOPY N/A 2022    [facility]    HIP BIPOLAR REPLACEMENT Right 2000    INSERTION PERITONEAL DIALYSIS CATHETER N/A 3/27/2023    Procedure: LAPAROSCOPIC INSERTION PERITONEAL DIALYSIS CATHETER, LAPAROSCOPIC OMENTOPEXY WITH LYSIS OF ADHESIONS;  Surgeon: Jose Berry MD;  Location: [facility] OR;  Service: General;  Laterality:  N/A;    INSERTION PERITONEAL DIALYSIS CATHETER Left 7/23/2023    Procedure: REVISION OF PERITONEAL DIALYSIS CATHETER;  Surgeon: Radha Oreilly MD;  Location: [facility];  Service: General;  Laterality: Left;    RENAL BIOPSY Left 07/15/2022           Inpatient Speech Pathology Dysphagia Evaluation        PAIN SCALE: None indicated.    PRECAUTIONS/CONTRAINDICATIONS: Standard    SUSPECTED ABUSE/NEGLECT/EXPLOITATION: None indicated.    SOCIAL/PSYCHOLOGICAL NEEDS/BARRIERS: None indicated.    PAST SOCIAL HISTORY: 79-year-old male lives at home with family    PRIOR FUNCTION: Independent    PATIENT GOALS/EXPECTATIONS: Continue eating orally.  Patient wanting thin water.    HISTORY: 79-year-old male with the above diagnosis is referred for speech therapy evaluation to assess for swallowing.  Concern for pneumonia.  Patient with history of silent aspiration per MBSS of 2/19/2024.  Patient received speech pathology services during last hospitalization.    CURRENT DIET LEVEL: N.p.o.    OBJECTIVE:    TEST ADMINISTERED: Clinical dysphagia evaluation    COGNITION/SAFETY AWARENESS: Patient follows basic directions in response to questions.  Occasional confusion noted.    BEHAVIORAL OBSERVATIONS: Alert and cooperative    ORAL MOTOR EXAM: Grossly within limits    VOICE QUALITY: Adequate    REFLEX EXAM: Deferred    POSTURE: Sitting up at side of bed    FEEDING/SWALLOWING FUNCTION: Assessed with nectar liquid, thin liquids, puréed solids, crunchy solid.    CLINICAL OBSERVATIONS: Nectar liquid by cup with minimal delay, patient holding the bolus at times, vocal quality remaining clear to cervical auscultation.  Thin liquid by cup small sip, vocal quality remaining clear to cervical auscultation.  No gross signs or symptoms of aspiration noted though patient is noted to be silent aspirator per modified barium swallow.  Purée solid with swallow completed with laryngeal elevation noted to palpation.  Crunchy solid with adequate  chewing followed by swallow completed clearing the oral cavity.  Patient took medication whole in applesauce.  Patient noted chewing pills initially.    DYSPHAGIA CRITERIA: Swallow delay.  Decreased laryngeal elevation and silent aspiration with thin liquid per modified barium swallow study of 2/19/2024.    FUNCTIONAL ASSESSMENT INSTRUMENT: Patient currently scored a level 5 of 7 on Functional Communication Measures for swallowing indicating a 20-39% limitation in function.    ASSESSMENT/ PLAN OF CARE:  Pt presents with limitations, noted below, that impede his ability to swallow safely and maintain nutrition. The skills of a therapist will be required to safely and effectively implement the following treatment plan to restore maximal level of function.    PROBLEMS:  1.  Swallow delay, decreased laryngeal elevation, risk of aspiration                       LTG 1: 30 days: Patient will tolerate least restrictive diet utilizing appropriate positioning and strategies with minimal assistance.                       STG 1a: 14 days: Patient will tolerate diet of regular soft solids and nectar thickened liquids with minimal assistance for strategies.                       STG 1b: 14 days: Patient will participate in oral motor/laryngeal exercise program with strength/range of motion 4+/5.                       STG 1d: 14 days: Patient/family education.                       TREATMENT: Dysphagia therapy to address swallow function through exercises and education of strategies.                FREQUENCY/DURATION: Once daily 5 times per week     REHAB POTENTIAL:  Pt has fair to good rehab potential.  The following limitations may influence improvement/ length of tx: Medical status.     RECOMMENDATIONS:   1.  Diet: Regular soft moist solids, nectar thickened liquid.  No straw.  2.  Positioning fully upright for all p.o. intake and 30 minutes following.  3.  Alternate small bites and small sips of solids and liquids at a slow  rate.  Small single sips of liquid with no straw.  Medications whole in applesauce. Reflux precautions.     REHAB POTENTIAL:  Pt has fair to good rehab potential.  The following limitations may influence improvement/ length of tx medical status.    RECOMMENDATIONS:   1.  Diet: Regular soft moist solids, nectar thickened liquid.  No straw.  2.  Positioning fully upright for all p.o. intake and 30 minutes following.  3.  Alternate small bites and small sips of solids and liquids at a slow rate.  Small single sips of liquid with no straw.  Medications whole in applesauce. Reflux precautions.      Pt/responsible party agrees with plan of care and has been informed of all alternatives, risks and benefits.                              Anticipated Discharge Disposition (SLP): home with assist (02/29/24 1255)                                                               EDUCATION  The patient has been educated in the following areas:   Modified Diet Instruction.              Time Calculation:    Time Calculation- SLP       Row Name 02/29/24 1255             Time Calculation- SLP    SLP Start Time 1200  -TB      SLP Stop Time 1255  -TB      SLP Time Calculation (min) 55 min  -TB      SLP Received On 02/29/24  -TB         Untimed Charges    SLP Eval/Re-eval  ST Eval Oral Pharyng Swallow - 40086  -TB      [id number]-[id number] Eval Oral Pharyng Swallow Minutes 55  -TB         Total Minutes    Untimed Charges Total Minutes 55  -TB       Total Minutes 55  -TB                User Key  (r) = Recorded By, (t) = Taken By, (c) = Cosigned By      Initials Name Provider Type    TB Katie Isaacs SLP Speech and Language Pathologist                    Therapy Charges for Today       Code Description Service Date Service Provider Modifiers Qty    88707982636 HC ST EVAL ORAL PHARYNG SWALLOW 4 2/29/2024 Katie Isaacs SLP GN 1                 ADILSON Álvarez  2/29/2024

## 2024-02-29 NOTE — PROGRESS NOTES
"[facility] Clinical Pharmacy Services: Vancomycin Pharmacokinetic Initial Consult Note    Nelson Wang is a 79 y.o. male who is on day 1 of pharmacy to dose vancomycin for Pneumonia.    Consult Information  Consulting Provider: Tobin  Planned Duration of Therapy: 7 days   Was Patient Receiving Prior to Admission/Consult?: No  Loading Dose Ordered or Given: 1750 mg on 24 at 0130  PK/PD Target: -600 mg/L.hr   Relevant ID History:     Escherichia coli Critical on 24     Other Antimicrobials: Cefepime    Imaging Reviewed?: Yes    Microbiology Data  MRSA PCR performed: 24; Result: Pending  Culture/Source:   blood    Vitals/Labs  Ht: 167.6 cm (66\"); Wt: 81.8 kg (180 lb 5.4 oz)  Temp (24hrs), Av.2 °F (37.3 °C), Min:99.1 °F (37.3 °C), Max:99.3 °F (37.4 °C)   Estimated Creatinine Clearance: 10.1 mL/min (A) (by C-G formula based on SCr of 6.04 mg/dL (H)).  Hemodialysis, schedule to be determined     Results from last 7 days   Lab Units 24  2254 24  2150 24  0634 24  1842   CREATININE mg/dL 6.04*  --  5.19* 4.82*   WBC 10*3/mm3  --  10.09 8.74 7.86     Assessment/Plan:    Vancomycin Dose: pulse dosing with 1750 mg IV once on 24 at 0130  Vanc Random ordered for 24 at 1400  Patient has order for Basic Metabolic Panel    Pharmacy will follow patient's kidney function and will adjust doses and obtain levels as necessary. Thank you for involving pharmacy in this patient's care. Please contact pharmacy with any questions or concerns.                           Bhavin Jerry [facility]  Clinical Pharmacist    "

## 2024-02-29 NOTE — CONSULTS
Patient Care Team:  Janna Mo MD as PCP - General (Internal Medicine)    Chief complaint: ESRD    Subjective     History of Present Illness  80yo with h/o ESRD on PD.  He had recently been discharged with diagnosis of pna.  He had presented to ER with worsened dyspnea and CT with worsened multifocal pna.  We were asked to see him for dialysis needs.  He is comfortable, no acute complaints.  Feels breathing improved since admission.    Review of Systems   Constitutional:  Positive for fatigue. Negative for chills.   Respiratory:  Positive for cough and shortness of breath.    Cardiovascular:  Negative for chest pain and leg swelling.   Gastrointestinal:  Positive for diarrhea. Negative for abdominal pain, nausea and vomiting.   Genitourinary:  Negative for dysuria.   Neurological:  Negative for headaches.        Past Medical History:   Diagnosis Date    Anemia     Ankle pain, right     CKD (chronic kidney disease), stage IV     Diabetes     Gout     High cholesterol     HL (hearing loss)     Hyperkalemia     Hypertension     Hypothyroidism     Psoriasis     Vitiligo    ,   Past Surgical History:   Procedure Laterality Date    ANKLE SURGERY  11/1990    APPENDECTOMY N/A 1954    COLONOSCOPY N/A 06/2022    [facility]    HIP BIPOLAR REPLACEMENT Right 01/2000    INSERTION PERITONEAL DIALYSIS CATHETER N/A 3/27/2023    Procedure: LAPAROSCOPIC INSERTION PERITONEAL DIALYSIS CATHETER, LAPAROSCOPIC OMENTOPEXY WITH LYSIS OF ADHESIONS;  Surgeon: Jose Berry MD;  Location: [facility];  Service: General;  Laterality: N/A;    INSERTION PERITONEAL DIALYSIS CATHETER Left 7/23/2023    Procedure: REVISION OF PERITONEAL DIALYSIS CATHETER;  Surgeon: Radha Oreilly MD;  Location: [facility];  Service: General;  Laterality: Left;    RENAL BIOPSY Left 07/15/2022   ,   Family History   Problem Relation Age of Onset    Diabetes Mother     Heart disease Father     Cancer Sister 35    Diabetes Sister      Diabetes Brother     Heart disease Paternal Uncle     Adi Hyperthermia Neg Hx    ,   Social History     Socioeconomic History    Marital status:    Tobacco Use    Smoking status: Former     Packs/day: 1.00     Years: 10.00     Additional pack years: 0.00     Total pack years: 10.00     Types: Cigarettes     Start date:      Quit date:      Years since quittin.1     Passive exposure: Past    Smokeless tobacco: Never    Tobacco comments:     quit 25 years ago, chews nicotine gum   Vaping Use    Vaping Use: Never used   Substance and Sexual Activity    Alcohol use: Yes     Comment: 1 DRINK/DAY    Drug use: Never    Sexual activity: Defer     E-cigarette/Vaping    E-cigarette/Vaping Use Never User      E-cigarette/Vaping Substances    Nicotine No     THC No     CBD No     Flavoring No      E-cigarette/Vaping Devices    Disposable No     Pre-filled or Refillable Cartridge No     Refillable Tank No     Pre-filled Pod No          ,   Medications Prior to Admission   Medication Sig Dispense Refill Last Dose    allopurinol (ZYLOPRIM) 300 MG tablet Take 1 tablet by mouth Daily.       ascorbic acid (VITAMIN C) 1000 MG tablet Take 1 tablet by mouth Daily.       atorvastatin (LIPITOR) 40 MG tablet Take 1 tablet by mouth Daily. 90 tablet 0     carvedilol (COREG) 12.5 MG tablet Take 1 tablet by mouth 2 (Two) Times a Day for 30 days. 60 tablet 0     cefTRIAXone (ROCEPHIN) 1 g injection Infuse 2 g into a venous catheter Daily for 11 days.       Insulin Glargine (LANTUS SOLOSTAR) 100 UNIT/ML injection pen Inject 15-20 Units under the skin into the appropriate area as directed Every Night.       levothyroxine (SYNTHROID, LEVOTHROID) 125 MCG tablet Take 1 tablet by mouth Daily. 90 tablet 1     Lidocaine, Anorectal, (LMX 5) 5 % cream cream Apply 1 g topically to the appropriate area as directed 3 (Three) Times a Day As Needed (Perineal pain). 38 g 0     nystatin (MYCOSTATIN) 100,000 unit/mL suspension Swish and  swallow 5 mL 4 (Four) Times a Day for 5 days. 100 mL 0     Omega-3 Fatty Acids (fish oil) 1000 MG capsule capsule Take 2 capsules by mouth Daily With Breakfast.       riFAXIMin (XIFAXAN) 550 MG tablet Take 1 tablet by mouth Every 8 (Eight) Hours for 29 doses. Indications: secondary or subsequent recurrent C. diff infection, Patient with h/o IBS now with acute watery diarrhea, c.diff PCR positive 29 tablet 0     Risankizumab-rzaa (SKYRIZI, 150 MG DOSE, SC) Inject  under the skin into the appropriate area as directed. Once every 3 months       sertraline (ZOLOFT) 100 MG tablet Take 1 tablet by mouth Every Night. 90 tablet 1     sevelamer (RENVELA) 800 MG tablet Take 2 tablets by mouth 2 (Two) Times a Day With Meals.       sodium bicarbonate 650 MG tablet Take 2 tablets by mouth 2 (Two) Times a Day for 30 days. 120 tablet 0     [START ON 3/1/2024] torsemide (DEMADEX) 20 MG tablet Take 2 tablets by mouth Every Morning. Resume use of Torsemide after diarrhea subsides       Turmeric 500 MG capsule Take 1 capsule by mouth 2 (Two) Times a Day.      , Scheduled Meds:  aspirin, 81 mg, Oral, Daily  cefepime, 1,000 mg, Intravenous, Once  cefepime, 2,000 mg, Intravenous, Q24H  dianeal lo-peter 1.5%, 2,000 mL, Intraperitoneal, 4 Exchanges Daily - Dwell Overnight  gentamicin, 1 Application, Topical, Daily  guaiFENesin, 600 mg, Oral, Q12H  heparin, 60 Units/kg, Intravenous, Once  insulin regular, 2-9 Units, Subcutaneous, Q6H  levothyroxine, 125 mcg, Oral, Q AM  nystatin, 5 mL, Swish & Swallow, 4x Daily  saccharomyces boulardii, 250 mg, Oral, BID  sevelamer, 1,600 mg, Oral, BID With Meals  sodium chloride, 10 mL, Intravenous, Q12H  vancomycin, 125 mg, Oral, Q6H   , Continuous Infusions:  heparin, 12 Units/kg/hr, Last Rate: 12 Units/kg/hr (02/29/24 1695)  Pharmacy to dose vancomycin,    , PRN Meds:    benzonatate    senna-docusate sodium **AND** polyethylene glycol **AND** bisacodyl **AND** bisacodyl    dextrose    dextrose    glucagon  "(human recombinant)    heparin    heparin    ipratropium-albuterol    ondansetron    Pharmacy to dose vancomycin    sodium chloride    sodium chloride    sodium chloride, and Allergies:  Patient has no known allergies.    Objective     Vital Signs  Temp:  [98.2 °F (36.8 °C)-99.3 °F (37.4 °C)] 98.2 °F (36.8 °C)  Heart Rate:  [75-94] 94  Resp:  [16-20] 18  BP: (101-156)/(47-91) 101/78    I/O this shift:  In: 120 [P.O.:120]  Out: -   I/O last 3 completed shifts:  In: 500 [IV Piggyback:500]  Out: -     Physical Exam  Constitutional:       Appearance: Normal appearance.   HENT:      Nose: Nose normal.      Mouth/Throat:      Mouth: Mucous membranes are moist.   Eyes:      General: No scleral icterus.     Pupils: Pupils are equal, round, and reactive to light.   Cardiovascular:      Rate and Rhythm: Normal rate and regular rhythm.      Pulses: Normal pulses.   Pulmonary:      Effort: Pulmonary effort is normal.      Breath sounds: Rhonchi present.   Abdominal:      General: Abdomen is flat.   Musculoskeletal:      Cervical back: Normal range of motion.      Right lower leg: No edema.      Left lower leg: No edema.   Skin:     General: Skin is warm and dry.   Neurological:      General: No focal deficit present.      Mental Status: He is alert.         Results Review:    I reviewed the patient's new clinical results.  I reviewed the patient's other test results and agree with the interpretation    WBC WBC   Date Value Ref Range Status   02/28/2024 10.09 3.40 - 10.80 10*3/mm3 Final      HGB Hemoglobin   Date Value Ref Range Status   02/28/2024 10.9 (L) 13.0 - 17.7 g/dL Final      HCT Hematocrit   Date Value Ref Range Status   02/28/2024 34.8 (L) 37.5 - 51.0 % Final      Platlets No results found for: \"LABPLAT\"   MCV MCV   Date Value Ref Range Status   02/28/2024 96.4 79.0 - 97.0 fL Final          Sodium Sodium   Date Value Ref Range Status   02/28/2024 139 136 - 145 mmol/L Final      Potassium Potassium   Date Value Ref " "Range Status   02/28/2024 4.1 3.5 - 5.2 mmol/L Final     Comment:     Slight hemolysis detected by analyzer. Result may be falsely elevated.      Chloride Chloride   Date Value Ref Range Status   02/28/2024 99 98 - 107 mmol/L Final      CO2 CO2   Date Value Ref Range Status   02/28/2024 28.7 22.0 - 29.0 mmol/L Final      BUN BUN   Date Value Ref Range Status   02/28/2024 22 8 - 23 mg/dL Final      Creatinine Creatinine   Date Value Ref Range Status   02/28/2024 6.04 (H) 0.76 - 1.27 mg/dL Final      Calcium Calcium   Date Value Ref Range Status   02/28/2024 8.3 (L) 8.6 - 10.5 mg/dL Final      PO4 No results found for: \"CAPO4\"   Albumin Albumin   Date Value Ref Range Status   02/28/2024 2.6 (L) 3.5 - 5.2 g/dL Final      Magnesium No results found for: \"MG\"   Uric Acid No results found for: \"URICACID\"         Assessment & Plan       Acute on chronic respiratory failure with hypoxia    Essential hypertension    Anemia due to chronic kidney disease    Type 2 diabetes mellitus without complication    Peritoneal dialysis catheter in place    HCAP (healthcare-associated pneumonia)    ESRD (end stage renal disease)      Assessment & Plan  ESRD, on peritoneal dialysis.  Volume status is adequate.  Family requesting to do night cycler for PD in hospital in place of manual exchanges.  Monitor closely.  continue nystatin swish and swallow while on antibiotics for fungal peritonitis prevention.  PNA-  multifocal pna seen on CT.  Abx per primary.  HTN-  continue home meds.  Anemia of chronic kidney disease, was on long-acting RONALD as outpatient.  Had RONALD last admission.  Iron studies are adequate.  at goal.  DMII- on sliding scale insulin.    I discussed the patients findings and my recommendations with patient and nursing staff    Rolf Cha MD  02/29/24  10:24 EST            "

## 2024-02-29 NOTE — PLAN OF CARE
Goal Outcome Evaluation:  Plan of Care Reviewed With: (P) patient        Progress: (P) no change  Outcome Evaluation: (P) Patient presents to occupational therapy with deficits in self-care, functional endurance, and balance. He would benefit from continued skilled OT services to maximize independence.      Anticipated Discharge Disposition (OT): (P) home with home health, home with assist

## 2024-02-29 NOTE — PROGRESS NOTES
Patient seen and evaluated this afternoon.  Sitting up in bed with family at the bedside appears to be resting comfortably.  Patient denies any chest pain indicates that shortness of breath is improved.  Indicates cough is productive.  Afebrile.  Heart rate within normal limits blood pressure within normal limits satting well on 3 L nasal cannula.  Blood sugars within normal limits.  PTT supratherapeutic heparin drip on hold.  MRSA not detected by the nares.  Vancomycin stopped.  Continuing cefepime.  Cleared for diet by speech.  Plan to continue supplemental oxygen titrate to keep sats greater than 90%, continue heparin drip.  Family bringing in cycler for peritoneal dialysis overnight.  Pulmonology planning for bronchoscopy in a.m.  N.p.o. after midnight.

## 2024-02-29 NOTE — CONSULTS
Pulmonary / Critical Care Consult Note      Patient Name: Nelson Wang  : 1945  MRN: [id number]  Primary Care Physician:  Janna Mo MD  Referring Physician: Devante Friedman MD  Date of admission: 2024    Subjective   Subjective     Reason for Consult/ Chief Complaint:   Pneumonia from unknown organism, acute hypoxic respiratory failure    HPI:  Nelson Wang is a 79 y.o. male with a past medical history of ESRD on peritoneal dialysis, immunosuppression on Skyrizi, T2DM, CAD, and hearing impairment presented to the ED for evaluation of shortness of breath requiring supplemental oxygen.  In the ED, troponins were 428, lactate was 2.6, CRP 5.42, and procalcitonin 0.73.   chest CT demonstrated bilateral pulmonary consolidation suggestive of multifocal pneumonia or aspiration pneumonia.  For the above reasons, the service was consulted to assist in the management and treatment of the patient's acute issues.  Upon assessment, patient lying in bed on 4 L nasal cannula in no apparent respiratory distress.  Findings and plan were discussed with the patient and family members.  Patient denies any chest pain or chest tightness, fever, chills, nausea or vomiting.    Review of Systems  Constitutional symptoms: Fatigue, otherwise denied complaints   Ear, nose, throat: Denied complaints  Cardiovascular:  Denied complaints  Respiratory: Dyspnea, otherwise denied complaints  Gastrointestinal: Denied complaints  Musculoskeletal: Denied complaints  Genitourinary: Denied complaints  Allergy / Immunology: Denied complaints  Hematologic: Denied complaints  Neurologic: Denied complaints  Skin: Denied complaints  Endocrine: Denied complaints  Psychiatric: Denied complaints      Personal History     Past Medical History:   Diagnosis Date    Anemia     Ankle pain, right     CKD (chronic kidney disease), stage IV     Diabetes     Gout     High cholesterol     HL (hearing loss)     Hyperkalemia     Hypertension      Hypothyroidism     Psoriasis     Vitiligo        Past Surgical History:   Procedure Laterality Date    ANKLE SURGERY  11/1990    APPENDECTOMY N/A 1954    COLONOSCOPY N/A 06/2022    [facility]    HIP BIPOLAR REPLACEMENT Right 01/2000    INSERTION PERITONEAL DIALYSIS CATHETER N/A 3/27/2023    Procedure: LAPAROSCOPIC INSERTION PERITONEAL DIALYSIS CATHETER, LAPAROSCOPIC OMENTOPEXY WITH LYSIS OF ADHESIONS;  Surgeon: Jose Berry MD;  Location: [facility] OR;  Service: General;  Laterality: N/A;    INSERTION PERITONEAL DIALYSIS CATHETER Left 7/23/2023    Procedure: REVISION OF PERITONEAL DIALYSIS CATHETER;  Surgeon: Radha Oreilly MD;  Location: [facility] OR;  Service: General;  Laterality: Left;    RENAL BIOPSY Left 07/15/2022       Family History: family history includes Cancer (age of onset: 35) in his sister; Diabetes in his brother, mother, and sister; Heart disease in his father and paternal uncle. Otherwise pertinent FHx was reviewed and not pertinent to current issue.    Social History:  reports that he quit smoking about 24 years ago. His smoking use included cigarettes. He started smoking about 34 years ago. He has a 10.00 pack-year smoking history. He has been exposed to tobacco smoke. He has never used smokeless tobacco. He reports current alcohol use. He reports that he does not use drugs.    Home Medications:  Insulin Glargine, Lidocaine (Anorectal), Risankizumab-rzaa, Turmeric, allopurinol, ascorbic acid, atorvastatin, carvedilol, cefTRIAXone, fish oil, levothyroxine, nystatin, sertraline, sevelamer, sodium bicarbonate, and torsemide    Allergies:  No Known Allergies    Objective    Objective     Vitals:   Temp:  [97.3 °F (36.3 °C)-99.3 °F (37.4 °C)] 97.3 °F (36.3 °C)  Heart Rate:  [71-94] 71  Resp:  [16-20] 18  BP: (101-156)/(47-91) 117/47  Flow (L/min):  [2-10] 3    Physical Exam:  Vital Signs Reviewed   General:  WDWN, Alert, NAD.  Chronically ill-appearing elderly male lying  in bed  HEENT:  PERRL, EOMI.  OP, nares clear, no sinus tenderness  Neck:  Supple, no JVD, no thyromegaly  Lymph: no axillary, cervical, supraclavicular lymphadenopathy noted bilaterally  Chest: Decreased aeration, rhonchi auscultation, no work of breathing noted on 4 L  CV: RRR, no MGR, pulses 2+, equal.  Abd:  Soft, NT, ND, + BS, no HSM, PD catheter  EXT:  no clubbing, no cyanosis, no edema, no joint tenderness  Neuro:  A&Ox3, CN grossly intact, no focal deficits.  Skin: No rashes or lesions noted.  Multiple wounds noted please see charting      Result Review    Result Review:  I have personally reviewed the results from the time of this admission to 2/29/2024 17:04 EST and agree with these findings:  []  Laboratory  []  Microbiology  []  Radiology  []  EKG/Telemetry   []  Cardiology/Vascular   []  Pathology  []  Old records  []  Other:  Most notable findings include:         Lab 02/28/24  2254 02/28/24  2150 02/24/24  0634 02/23/24  1842 02/23/24  0420 02/23/24  0342   WBC  --  10.09 8.74 7.86 8.38  --    HEMOGLOBIN  --  10.9* 8.7* 8.8* 7.6*  --    HEMATOCRIT  --  34.8* 27.8* 28.5* 24.6*  --    PLATELETS  --  230 155 150 132*  --    SODIUM 139  --  138 139  --  136   POTASSIUM 4.1  --  3.5 3.5  --  3.9   CHLORIDE 99  --  102 102  --  99   CO2 28.7  --  25.5 25.3  --  24.3   BUN 22  --  28* 30*  --  28*   CREATININE 6.04*  --  5.19* 4.82*  --  4.92*   GLUCOSE 164*  --  192* 187*  --  205*   CALCIUM 8.3*  --  8.7 8.7  --  8.5*   PHOSPHORUS  --   --  4.2  --   --  3.8   TOTAL PROTEIN 5.9*  --  5.7* 5.9*  --  5.9*   ALBUMIN 2.6*  --  2.4* 2.6*  --  2.5*   GLOBULIN 3.3  --   --  3.3  --   --        Assessment & Plan   Assessment / Plan     Active Hospital Problems:  Active Hospital Problems    Diagnosis     **Acute on chronic respiratory failure with hypoxia     HCAP (healthcare-associated pneumonia)     ESRD (end stage renal disease)     Peritoneal dialysis catheter in place     Essential hypertension     Anemia due  to chronic kidney disease     Type 2 diabetes mellitus without complication      Impression:  Acute hypoxic respiratory failure requiring supplemental oxygen  Concern for aspiration pneumonia  NSTEMI type I versus type II  ESRD on peritoneal dialysis  Anemia of chronic kidney disease  T2DM with hyperglycemia    Plan:  -On 4 L nasal cannula.  Continue to wean supplemental oxygen to maintain SpO2 greater than 90%  -2/28 chest CT reviewed demonstrating RLL infiltrates concerning for aspiration pneumonia  -Recommend speech evaluation and dietary recommendations per them  -Will take for bronchoscopy with airway inspection, brushings, biopsies, bronchoalveolar lavage tomorrow. I have discussed the risks of the procedure with the patient including pneumothorax, hemothorax, bleeding, hypoxia, required mechanical ventilation and death. The patient recognizes these findings, acknowledges these findings and is agreeable to the procedure.  -Micro negative to date.  Continue cefepime and vancomycin for now until after bronchoscopy  -Continue Brovana, Pulmicort, and DuoNebs  -Continue bronchopulmonary hygiene.  Encourage I-S and flutter  -Encourage activity as tolerated.  Out of bed to chair  -Continue aspiration precautions  -Cardiology following, appreciate input  -Nephrology following, appreciate input  -PD per nephrology  -N.p.o. after midnight    Electronically signed by Sandra Espinal MD, 02/29/24, 6:36 PM EST.      DVT prophylaxis:  Medical DVT prophylaxis orders are present.      Code Status and Medical Interventions:   Ordered at: 02/29/24 0213     Level Of Support Discussed With:    Patient     Code Status (Patient has no pulse and is not breathing):    CPR (Attempt to Resuscitate)     Medical Interventions (Patient has pulse or is breathing):    Full Support      Labs, imaging, notes and medications personally reviewed.  Discussed with primary    Thank you for involving me in the care of this patient.    Electronically  signed by Vamshi Villafana, APRN, 02/29/24, 5:14 PM EST.

## 2024-02-29 NOTE — PROGRESS NOTES
Respiratory Therapist Broncho-Pulmonary Hygiene Progress Note      Patient Name:  Nelson Wang  YOB: 1945    Nelson Wnag meets the qualification for Level 2 of the Bronco-Pulmonary Hygiene Protocol. This was based on my daily patient assessment and includes review of chest x-ray results, cough ability and quality, oxygenation, secretions or risk for secretion development and patient mobility.     Broncho-Pulmonary Hygiene Assessment:    Level of Movement: Actively changing positions without assistance  Alert/ oriented/ cooperative    Breath Sounds: Clear to slightly diminished    Cough: Strong, effective    Chest X-Ray: Possible signs of consolidation and/or atelectasis or clear.     Sputum Productions: None or small amount of thin or watery secretions with effective cough    History and Physical: Respiratory Failure due to mucus plugging or retained secretions    SpO2 to Oxygen Need: greater than 92% on room air or  less than 3L nasal canula    Current SpO2 is: 100 on 4    Based on this information, I have completed the following interventions: Aerobika with bronchodialtor medication or TID      Electronically signed by Sasha Soto RRT, 02/29/24, 8:25 AM EST.

## 2024-02-29 NOTE — CASE MANAGEMENT/SOCIAL WORK
Discharge Planning Assessment   Nate     Patient Name: Nelson Wang  MRN: [id number]  Today's Date: 2/29/2024    Admit Date: 2/28/2024    Plan: last admission Pt went home with [facility].   Discharge Needs Assessment       Row Name 02/29/24 1206       Living Environment    People in Home child(laura), adult;spouse    Current Living Arrangements home    Potentially Unsafe Housing Conditions none    In the past 12 months has the electric, gas, oil, or water company threatened to shut off services in your home? No    Primary Care Provided by self    Provides Primary Care For no one    Family Caregiver if Needed child(laura), adult;spouse    Quality of Family Relationships helpful;involved;supportive    Able to Return to Prior Arrangements yes       Resource/Environmental Concerns    Resource/Environmental Concerns none    Transportation Concerns none       Transportation Needs    In the past 12 months, has lack of transportation kept you from medical appointments or from getting medications? no    In the past 12 months, has lack of transportation kept you from meetings, work, or from getting things needed for daily living? No       Food Insecurity    Within the past 12 months, you worried that your food would run out before you got the money to buy more. Never true    Within the past 12 months, the food you bought just didn't last and you didn't have money to get more. Never true       Transition Planning    Patient/Family Anticipates Transition to home with family    Patient/Family Anticipated Services at Transition none       Discharge Needs Assessment    Concerns to be Addressed discharge planning    Anticipated Changes Related to Illness none    Equipment Needed After Discharge none    Discharge Coordination/Progress Pt lives at home with family. Pt has good support from family, Pt is a re-admission. PCP: Renetta Mo, Pharm: [facility]. Pt dada does PD at home. Pt plans to return home, Pt denies any fin.  stressors. SW will continue to follow for needs.                   Discharge Plan       Row Name 02/29/24 1210       Plan    Plan last admission Pt went home with [facility].      Row Name 02/29/24 1208       Plan    Plan Pt lives at home with family. Pt has good support from family, Pt is a re-admission. PCP: Renetta Mo, Pharm: [facility]. Pt dada does PD at home. Pt plans to return home, Pt denies any fin. stressors. SW will continue to follow for needs.                  Continued Care and Services - Admitted Since 2/28/2024    Coordination has not been started for this encounter.          Demographic Summary       Row Name 02/29/24 1156       General Information    Admission Type inpatient    Arrived From emergency department    Referral Source admission list    Reason for Consult discharge planning    Preferred Language English       Contact Information    Permission Granted to Share Info With lay caregiver    Contact Information Obtained for lay caregiver       Lay Caregiver Information    Name, Lay Caregiver Jose Wang    Phone, Lay Caregiver [phone]                   Functional Status       Row Name 02/29/24 1157       Functional Status    Usual Activity Tolerance good    Current Activity Tolerance good       Physical Activity    On average, how many days per week do you engage in moderate to strenuous exercise (like a brisk walk)? 0 days    On average, how many minutes do you engage in exercise at this level? 0 min    Number of minutes of exercise per week 0       Assessment of Health Literacy    How often do you have someone help you read hospital materials? Never    How often do you have problems learning about your medical condition because of difficulty understanding written information? Never    How often do you have a problem understanding what is told to you about your medical condition? Never    How confident are you filling out medical forms by yourself? Quite a bit    Health Literacy Good        Functional Status, IADL    Medications independent    Meal Preparation independent    Housekeeping independent    Laundry independent    Shopping independent    IADL Comments Pt lives at home with family.       Mental Status    General Appearance WDL WDL       Mental Status Summary    Recent Changes in Mental Status/Cognitive Functioning no changes       Employment/    Employment Status retired                   Psychosocial    No documentation.                  Abuse/Neglect    No documentation.                  Legal       Row Name 02/29/24 120       Financial Resource Strain    How hard is it for you to pay for the very basics like food, housing, medical care, and heating? Not hard       Financial/Legal    Source of Income social security    Application for Public Assistance not applied       Legal    Criminal Activity/Legal Involvement none                   Substance Abuse    No documentation.                  Patient Forms    No documentation.                     Janna Velasquez

## 2024-02-29 NOTE — PLAN OF CARE
Goal Outcome Evaluation:  Plan of Care Reviewed With: patient, spouse         ASSESSMENT/ PLAN OF CARE:  Pt presents with limitations, noted below, that impede his ability to swallow safely and maintain nutrition. The skills of a therapist will be required to safely and effectively implement the following treatment plan to restore maximal level of function.    PROBLEMS:  1.  Swallow delay, decreased laryngeal elevation, risk of aspiration                                              TREATMENT: Dysphagia therapy to address swallow function through exercises and education of strategies.                FREQUENCY/DURATION: Once daily 5 times per week     REHAB POTENTIAL:  Pt has fair to good rehab potential.  The following limitations may influence improvement/ length of tx: Medical status.     RECOMMENDATIONS:   1.  Diet: Regular soft moist solids, nectar thickened liquid.  No straw.  2.  Positioning fully upright for all p.o. intake and 30 minutes following.  3.  Alternate small bites and small sips of solids and liquids at a slow rate.  Small single sips of liquid with no straw.  Medications whole in applesauce. Reflux precautions.     REHAB POTENTIAL:  Pt has fair to good rehab potential.  The following limitations may influence improvement/ length of tx medical status.    RECOMMENDATIONS:   1.  Diet: Regular soft moist solids, nectar thickened liquid.  No straw.  2.  Positioning fully upright for all p.o. intake and 30 minutes following.  3.  Alternate small bites and small sips of solids and liquids at a slow rate.  Small single sips of liquid with no straw.  Medications whole in applesauce. Reflux precautions.           Anticipated Discharge Disposition (SLP): home with assist

## 2024-02-29 NOTE — PLAN OF CARE
jGoal Outcome Evaluation:  Plan of Care Reviewed With: (P) patient        Progress: (P) improving  Outcome Evaluation: (P) Pt presents to therapy today with ability to transfer and ambulate safely. Pt is safe to d/c home when appropriate.      Anticipated Discharge Disposition (PT): (P) home

## 2024-03-01 ENCOUNTER — ANESTHESIA (OUTPATIENT)
Dept: GASTROENTEROLOGY | Facility: HOSPITAL | Age: 79
End: 2024-03-01
Payer: MEDICARE

## 2024-03-01 ENCOUNTER — ANESTHESIA EVENT (OUTPATIENT)
Dept: GASTROENTEROLOGY | Facility: HOSPITAL | Age: 79
End: 2024-03-01
Payer: MEDICARE

## 2024-03-01 PROBLEM — J69.0 ASPIRATION PNEUMONITIS: Status: ACTIVE | Noted: 2024-02-28

## 2024-03-01 LAB
ACB CMPLX DNA BAL NAA+NON-PRB-NCNCRNG: NOT DETECTED
ANION GAP SERPL CALCULATED.3IONS-SCNC: 10.9 MMOL/L (ref 5–15)
APTT PPP: 61.7 SECONDS (ref 78–95.9)
APTT PPP: 67.6 SECONDS (ref 78–95.9)
BLACTX-M ISLT/SPM QL: NORMAL
BLAIMP ISLT/SPM QL: NORMAL
BLAKPC ISLT/SPM QL: NORMAL
BLAOXA-48-LIKE ISLT/SPM QL: NORMAL
BLAVIM ISLT/SPM QL: NORMAL
BUN SERPL-MCNC: 33 MG/DL (ref 8–23)
BUN/CREAT SERPL: 5.2 (ref 7–25)
C PNEUM DNA NPH QL NAA+NON-PROBE: NOT DETECTED
CALCIUM SPEC-SCNC: 8 MG/DL (ref 8.6–10.5)
CHLORIDE SERPL-SCNC: 100 MMOL/L (ref 98–107)
CILIATED BAL QL: 63 %
CO2 SERPL-SCNC: 25.1 MMOL/L (ref 22–29)
CREAT SERPL-MCNC: 6.4 MG/DL (ref 0.76–1.27)
DEPRECATED RDW RBC AUTO: 60.4 FL (ref 37–54)
E CLOAC COMP DNA BAL NAA+NON-PRB-NCNCRNG: NOT DETECTED
E COLI DNA BAL NAA+NON-PRB-NCNCRNG: NOT DETECTED
EGFRCR SERPLBLD CKD-EPI 2021: 8.3 ML/MIN/1.73
EOSINOPHIL NFR FLD MANUAL: 1 %
ERYTHROCYTE [DISTWIDTH] IN BLOOD BY AUTOMATED COUNT: 17.1 % (ref 12.3–15.4)
FLUAV SUBTYP SPEC NAA+PROBE: NOT DETECTED
FLUBV RNA ISLT QL NAA+PROBE: NOT DETECTED
GLUCOSE BLDC GLUCOMTR-MCNC: 123 MG/DL (ref 70–99)
GLUCOSE BLDC GLUCOMTR-MCNC: 153 MG/DL (ref 70–99)
GLUCOSE SERPL-MCNC: 170 MG/DL (ref 65–99)
GP B STREP DNA BAL NAA+NON-PRB-NCNCRNG: NOT DETECTED
HADV DNA SPEC NAA+PROBE: NOT DETECTED
HAEM INFLU DNA BAL NAA+NON-PRB-NCNCRNG: NOT DETECTED
HCOV RNA LOWER RESP QL NAA+NON-PROBE: NOT DETECTED
HCT VFR BLD AUTO: 29.9 % (ref 37.5–51)
HGB BLD-MCNC: 9.2 G/DL (ref 13–17.7)
HMPV RNA NPH QL NAA+NON-PROBE: NOT DETECTED
HPIV RNA LOWER RESP QL NAA+NON-PROBE: NOT DETECTED
K AEROGENES DNA BAL NAA+NON-PRB-NCNCRNG: NOT DETECTED
K OXYTOCA DNA BAL NAA+NON-PRB-NCNCRNG: NOT DETECTED
K PNEU GRP DNA BAL NAA+NON-PRB-NCNCRNG: NOT DETECTED
L PNEUMO DNA LOWER RESP QL NAA+NON-PROBE: NOT DETECTED
LYMPHOCYTES NFR FLD MANUAL: 9 %
M CATARRHALIS DNA BAL NAA+NON-PRB-NCNCRNG: NOT DETECTED
M PNEUMO IGG SER IA-ACNC: NOT DETECTED
MACROPHAGE FLUID: 2 %
MCH RBC QN AUTO: 30.3 PG (ref 26.6–33)
MCHC RBC AUTO-ENTMCNC: 30.8 G/DL (ref 31.5–35.7)
MCV RBC AUTO: 98.4 FL (ref 79–97)
MECA+MECC ISLT/SPM QL: NORMAL
NDM GENE: NORMAL
NEUTROPHILS NFR FLD MANUAL: 25 %
P AERUGINOSA DNA BAL NAA+NON-PRB-NCNCRNG: NOT DETECTED
PLATELET # BLD AUTO: 155 10*3/MM3 (ref 140–450)
PMV BLD AUTO: 10.9 FL (ref 6–12)
POTASSIUM SERPL-SCNC: 3.9 MMOL/L (ref 3.5–5.2)
PROTEUS SP DNA BAL NAA+NON-PRB-NCNCRNG: NOT DETECTED
RBC # BLD AUTO: 3.04 10*6/MM3 (ref 4.14–5.8)
RHINOVIRUS RNA SPEC NAA+PROBE: NOT DETECTED
RSV RNA NPH QL NAA+NON-PROBE: NOT DETECTED
S AUREUS DNA BAL NAA+NON-PRB-NCNCRNG: NOT DETECTED
S MARCESCENS DNA BAL NAA+NON-PRB-NCNCRNG: NOT DETECTED
S PNEUM DNA BAL NAA+NON-PRB-NCNCRNG: NOT DETECTED
S PYO DNA BAL NAA+NON-PRB-NCNCRNG: NOT DETECTED
SODIUM SERPL-SCNC: 136 MMOL/L (ref 136–145)
VISUAL PRESENCE OF BLOOD: NORMAL
WBC NRBC COR # BLD AUTO: 7.46 10*3/MM3 (ref 3.4–10.8)

## 2024-03-01 PROCEDURE — 87102 FUNGUS ISOLATION CULTURE: CPT | Performed by: STUDENT IN AN ORGANIZED HEALTH CARE EDUCATION/TRAINING PROGRAM

## 2024-03-01 PROCEDURE — 87071 CULTURE AEROBIC QUANT OTHER: CPT | Performed by: STUDENT IN AN ORGANIZED HEALTH CARE EDUCATION/TRAINING PROGRAM

## 2024-03-01 PROCEDURE — 31645 BRNCHSC W/THER ASPIR 1ST: CPT | Performed by: STUDENT IN AN ORGANIZED HEALTH CARE EDUCATION/TRAINING PROGRAM

## 2024-03-01 PROCEDURE — 94799 UNLISTED PULMONARY SVC/PX: CPT

## 2024-03-01 PROCEDURE — 0B9F8ZX DRAINAGE OF RIGHT LOWER LUNG LOBE, VIA NATURAL OR ARTIFICIAL OPENING ENDOSCOPIC, DIAGNOSTIC: ICD-10-PCS | Performed by: STUDENT IN AN ORGANIZED HEALTH CARE EDUCATION/TRAINING PROGRAM

## 2024-03-01 PROCEDURE — 88108 CYTOPATH CONCENTRATE TECH: CPT | Performed by: STUDENT IN AN ORGANIZED HEALTH CARE EDUCATION/TRAINING PROGRAM

## 2024-03-01 PROCEDURE — 82948 REAGENT STRIP/BLOOD GLUCOSE: CPT

## 2024-03-01 PROCEDURE — 87206 SMEAR FLUORESCENT/ACID STAI: CPT | Performed by: STUDENT IN AN ORGANIZED HEALTH CARE EDUCATION/TRAINING PROGRAM

## 2024-03-01 PROCEDURE — 87496 CYTOMEG DNA AMP PROBE: CPT | Performed by: STUDENT IN AN ORGANIZED HEALTH CARE EDUCATION/TRAINING PROGRAM

## 2024-03-01 PROCEDURE — 25810000003 SODIUM CHLORIDE 0.9 % SOLUTION 500 ML FLEX CONT: Performed by: FAMILY MEDICINE

## 2024-03-01 PROCEDURE — 99233 SBSQ HOSP IP/OBS HIGH 50: CPT | Performed by: STUDENT IN AN ORGANIZED HEALTH CARE EDUCATION/TRAINING PROGRAM

## 2024-03-01 PROCEDURE — 87070 CULTURE OTHR SPECIMN AEROBIC: CPT | Performed by: STUDENT IN AN ORGANIZED HEALTH CARE EDUCATION/TRAINING PROGRAM

## 2024-03-01 PROCEDURE — 94669 MECHANICAL CHEST WALL OSCILL: CPT

## 2024-03-01 PROCEDURE — 25010000002 CEFEPIME PER 500 MG: Performed by: STUDENT IN AN ORGANIZED HEALTH CARE EDUCATION/TRAINING PROGRAM

## 2024-03-01 PROCEDURE — 85730 THROMBOPLASTIN TIME PARTIAL: CPT | Performed by: FAMILY MEDICINE

## 2024-03-01 PROCEDURE — 25010000002 GLYCOPYRROLATE 0.2 MG/ML SOLUTION: Performed by: NURSE ANESTHETIST, CERTIFIED REGISTERED

## 2024-03-01 PROCEDURE — 94664 DEMO&/EVAL PT USE INHALER: CPT

## 2024-03-01 PROCEDURE — 25010000002 HEPARIN (PORCINE) PER 1000 UNITS: Performed by: INTERNAL MEDICINE

## 2024-03-01 PROCEDURE — 25810000003 SODIUM CHLORIDE 0.9 % SOLUTION: Performed by: NURSE ANESTHETIST, CERTIFIED REGISTERED

## 2024-03-01 PROCEDURE — 80048 BASIC METABOLIC PNL TOTAL CA: CPT | Performed by: FAMILY MEDICINE

## 2024-03-01 PROCEDURE — 25010000002 FENTANYL CITRATE (PF) 50 MCG/ML SOLUTION: Performed by: NURSE ANESTHETIST, CERTIFIED REGISTERED

## 2024-03-01 PROCEDURE — 87633 RESP VIRUS 12-25 TARGETS: CPT | Performed by: STUDENT IN AN ORGANIZED HEALTH CARE EDUCATION/TRAINING PROGRAM

## 2024-03-01 PROCEDURE — 87116 MYCOBACTERIA CULTURE: CPT | Performed by: STUDENT IN AN ORGANIZED HEALTH CARE EDUCATION/TRAINING PROGRAM

## 2024-03-01 PROCEDURE — 25010000002 PROPOFOL 500 MG/50ML EMULSION: Performed by: NURSE ANESTHETIST, CERTIFIED REGISTERED

## 2024-03-01 PROCEDURE — 31624 DX BRONCHOSCOPE/LAVAGE: CPT | Performed by: STUDENT IN AN ORGANIZED HEALTH CARE EDUCATION/TRAINING PROGRAM

## 2024-03-01 PROCEDURE — 25010000002 PROPOFOL 10 MG/ML EMULSION: Performed by: NURSE ANESTHETIST, CERTIFIED REGISTERED

## 2024-03-01 PROCEDURE — 87252 VIRUS INOCULATION TISSUE: CPT | Performed by: STUDENT IN AN ORGANIZED HEALTH CARE EDUCATION/TRAINING PROGRAM

## 2024-03-01 PROCEDURE — 87205 SMEAR GRAM STAIN: CPT | Performed by: STUDENT IN AN ORGANIZED HEALTH CARE EDUCATION/TRAINING PROGRAM

## 2024-03-01 PROCEDURE — 25010000002 HEPARIN (PORCINE) 25000-0.45 UT/250ML-% SOLUTION: Performed by: STUDENT IN AN ORGANIZED HEALTH CARE EDUCATION/TRAINING PROGRAM

## 2024-03-01 PROCEDURE — 63710000001 INSULIN REGULAR HUMAN PER 5 UNITS: Performed by: STUDENT IN AN ORGANIZED HEALTH CARE EDUCATION/TRAINING PROGRAM

## 2024-03-01 PROCEDURE — 89051 BODY FLUID CELL COUNT: CPT | Performed by: STUDENT IN AN ORGANIZED HEALTH CARE EDUCATION/TRAINING PROGRAM

## 2024-03-01 PROCEDURE — 85027 COMPLETE CBC AUTOMATED: CPT | Performed by: FAMILY MEDICINE

## 2024-03-01 PROCEDURE — 88312 SPECIAL STAINS GROUP 1: CPT | Performed by: STUDENT IN AN ORGANIZED HEALTH CARE EDUCATION/TRAINING PROGRAM

## 2024-03-01 PROCEDURE — 25010000002 HEPARIN (PORCINE) 25000-0.45 UT/250ML-% SOLUTION: Performed by: FAMILY MEDICINE

## 2024-03-01 PROCEDURE — 85730 THROMBOPLASTIN TIME PARTIAL: CPT | Performed by: STUDENT IN AN ORGANIZED HEALTH CARE EDUCATION/TRAINING PROGRAM

## 2024-03-01 RX ORDER — SODIUM CHLORIDE 9 MG/ML
INJECTION, SOLUTION INTRAVENOUS CONTINUOUS PRN
Status: DISCONTINUED | OUTPATIENT
Start: 2024-03-01 | End: 2024-03-01 | Stop reason: SURG

## 2024-03-01 RX ORDER — FENTANYL CITRATE 50 UG/ML
INJECTION, SOLUTION INTRAMUSCULAR; INTRAVENOUS AS NEEDED
Status: DISCONTINUED | OUTPATIENT
Start: 2024-03-01 | End: 2024-03-01 | Stop reason: SURG

## 2024-03-01 RX ORDER — HEPARIN SODIUM 5000 [USP'U]/ML
5000 INJECTION, SOLUTION INTRAVENOUS; SUBCUTANEOUS EVERY 8 HOURS SCHEDULED
Status: DISCONTINUED | OUTPATIENT
Start: 2024-03-01 | End: 2024-03-09 | Stop reason: HOSPADM

## 2024-03-01 RX ORDER — PROPOFOL 10 MG/ML
INJECTION, EMULSION INTRAVENOUS AS NEEDED
Status: DISCONTINUED | OUTPATIENT
Start: 2024-03-01 | End: 2024-03-01 | Stop reason: SURG

## 2024-03-01 RX ORDER — SODIUM CHLORIDE 9 MG/ML
50 INJECTION, SOLUTION INTRAVENOUS CONTINUOUS
Status: DISCONTINUED | OUTPATIENT
Start: 2024-03-01 | End: 2024-03-01

## 2024-03-01 RX ORDER — LIDOCAINE HYDROCHLORIDE 40 MG/ML
INJECTION, SOLUTION RETROBULBAR; TOPICAL AS NEEDED
Status: DISCONTINUED | OUTPATIENT
Start: 2024-03-01 | End: 2024-03-01 | Stop reason: HOSPADM

## 2024-03-01 RX ORDER — GLYCOPYRROLATE 0.2 MG/ML
INJECTION INTRAMUSCULAR; INTRAVENOUS AS NEEDED
Status: DISCONTINUED | OUTPATIENT
Start: 2024-03-01 | End: 2024-03-01 | Stop reason: SURG

## 2024-03-01 RX ORDER — PROPOFOL 10 MG/ML
VIAL (ML) INTRAVENOUS AS NEEDED
Status: DISCONTINUED | OUTPATIENT
Start: 2024-03-01 | End: 2024-03-01 | Stop reason: SURG

## 2024-03-01 RX ORDER — LIDOCAINE HYDROCHLORIDE 20 MG/ML
INJECTION, SOLUTION EPIDURAL; INFILTRATION; INTRACAUDAL; PERINEURAL AS NEEDED
Status: DISCONTINUED | OUTPATIENT
Start: 2024-03-01 | End: 2024-03-01 | Stop reason: SURG

## 2024-03-01 RX ORDER — MAGNESIUM HYDROXIDE 1200 MG/15ML
LIQUID ORAL AS NEEDED
Status: DISCONTINUED | OUTPATIENT
Start: 2024-03-01 | End: 2024-03-01 | Stop reason: HOSPADM

## 2024-03-01 RX ADMIN — Medication 250 MG: at 21:15

## 2024-03-01 RX ADMIN — PROPOFOL 100 MCG/KG/MIN: 10 INJECTION, EMULSION INTRAVENOUS at 09:08

## 2024-03-01 RX ADMIN — Medication 10 ML: at 21:16

## 2024-03-01 RX ADMIN — HEPARIN SODIUM 5000 UNITS: 5000 INJECTION INTRAVENOUS; SUBCUTANEOUS at 21:15

## 2024-03-01 RX ADMIN — BUDESONIDE 0.5 MG: 0.5 INHALANT RESPIRATORY (INHALATION) at 07:33

## 2024-03-01 RX ADMIN — LEVOTHYROXINE SODIUM 125 MCG: 125 TABLET ORAL at 07:07

## 2024-03-01 RX ADMIN — VANCOMYCIN HYDROCHLORIDE 125 MG: 125 CAPSULE ORAL at 07:07

## 2024-03-01 RX ADMIN — SODIUM CHLORIDE: 9 INJECTION, SOLUTION INTRAVENOUS at 09:08

## 2024-03-01 RX ADMIN — INSULIN HUMAN 2 UNITS: 100 INJECTION, SOLUTION PARENTERAL at 17:03

## 2024-03-01 RX ADMIN — SODIUM CHLORIDE, SODIUM LACTATE, CALCIUM CHLORIDE, MAGNESIUM CHLORIDE AND DEXTROSE 6000 ML: 1.5; 538; 448; 18.3; 5.08 INJECTION, SOLUTION INTRAPERITONEAL at 21:56

## 2024-03-01 RX ADMIN — ARFORMOTEROL TARTRATE 15 MCG: 15 SOLUTION RESPIRATORY (INHALATION) at 19:14

## 2024-03-01 RX ADMIN — LIDOCAINE HYDROCHLORIDE 100 MG: 20 INJECTION, SOLUTION EPIDURAL; INFILTRATION; INTRACAUDAL; PERINEURAL at 09:08

## 2024-03-01 RX ADMIN — GENTAMICIN SULFATE 1 APPLICATION: 1 CREAM TOPICAL at 13:18

## 2024-03-01 RX ADMIN — NYSTATIN 500000 UNITS: 100000 SUSPENSION ORAL at 21:15

## 2024-03-01 RX ADMIN — ACETAMINOPHEN 650 MG: 325 TABLET ORAL at 21:37

## 2024-03-01 RX ADMIN — ARFORMOTEROL TARTRATE 15 MCG: 15 SOLUTION RESPIRATORY (INHALATION) at 07:33

## 2024-03-01 RX ADMIN — HEPARIN SODIUM 5000 UNITS: 5000 INJECTION INTRAVENOUS; SUBCUTANEOUS at 13:15

## 2024-03-01 RX ADMIN — FENTANYL CITRATE 100 MCG: 50 INJECTION, SOLUTION INTRAMUSCULAR; INTRAVENOUS at 09:08

## 2024-03-01 RX ADMIN — SODIUM CHLORIDE 500 ML: 9 INJECTION, SOLUTION INTRAVENOUS at 08:50

## 2024-03-01 RX ADMIN — HEPARIN SODIUM 13 UNITS/KG/HR: 10000 INJECTION, SOLUTION INTRAVENOUS at 11:23

## 2024-03-01 RX ADMIN — PROPOFOL 100 MG: 10 INJECTION, EMULSION INTRAVENOUS at 09:08

## 2024-03-01 RX ADMIN — GUAIFENESIN 600 MG: 600 TABLET ORAL at 21:15

## 2024-03-01 RX ADMIN — HEPARIN SODIUM 10 UNITS/KG/HR: 10000 INJECTION, SOLUTION INTRAVENOUS at 07:10

## 2024-03-01 RX ADMIN — SEVELAMER CARBONATE 1600 MG: 800 TABLET, FILM COATED ORAL at 17:03

## 2024-03-01 RX ADMIN — Medication 5 MG: at 21:15

## 2024-03-01 RX ADMIN — VANCOMYCIN HYDROCHLORIDE 125 MG: 125 CAPSULE ORAL at 02:34

## 2024-03-01 RX ADMIN — GLYCOPYRROLATE 0.2 MG: 0.2 INJECTION INTRAMUSCULAR; INTRAVENOUS at 09:07

## 2024-03-01 RX ADMIN — Medication 10 ML: at 10:47

## 2024-03-01 RX ADMIN — CEFEPIME 2000 MG: 2 INJECTION, POWDER, FOR SOLUTION INTRAVENOUS at 21:15

## 2024-03-01 RX ADMIN — NYSTATIN 500000 UNITS: 100000 SUSPENSION ORAL at 17:03

## 2024-03-01 RX ADMIN — BUDESONIDE 0.5 MG: 0.5 INHALANT RESPIRATORY (INHALATION) at 19:14

## 2024-03-01 NOTE — PROGRESS NOTES
Patient could not be seen this morning.  He was off the unit for bronchoscopy.  Chart was reviewed.  Discussed with Dr. Nichols.    Agree with current treatment plan.  Troponin rise appears to be related to demand ischemia.  There is no evidence of acute coronary syndrome.  Recent echo shows normal LV systolic function without significant valvular pathology.  Will plan on doing cardiac stress test in the outpatient setting after resolution of current illness.  Dr. Collado will be covering this weekend if questions arise.

## 2024-03-01 NOTE — PROGRESS NOTES
[facility]     Nephrology Progress Note      Patient Name: Nelson Wang  : 1945  MRN: [id number]  Primary Care Physician:  Janna Mo MD  Date of admission: 2024    Subjective   Subjective     Interval History:  Patient Reports feeling a bit better.  Status post bronchoscopy with friable airway mucosa.  Imaging showed worsening infiltrates.  On IV antibiotics.  Peritoneal dialysis when the cycler is being performed per Dr. Wang, his daughter, without event.  Tolerating some p.o.  CAD several diarrhea episodes yesterday, better today.    Review of Systems   All systems were reviewed and negative except for: What is mentioned above.    Objective   Objective     Vitals:   Temp:  [97.3 °F (36.3 °C)-98.7 °F (37.1 °C)] 98.7 °F (37.1 °C)  Heart Rate:  [64-96] 81  Resp:  [12-24] 22  BP: ()/(51-81) 115/64  Flow (L/min):  [2-3] 2  Physical Exam:   Constitutional: Awake, alert, no distress, mild conversational dyspnea.  Sitting in bed edge.   Eyes: sclerae anicteric, no conjunctival injection   HENT: mucous membranes moist   Neck: Supple, no thyromegaly, no lymphadenopathy, trachea midline, no JVD, full EJ   Respiratory: Bilateral scattered rhonchi and rails, nonlabored respirations    Cardiovascular: RRR, no murmurs, rubs, or gallops.   Gastrointestinal: Positive bowel sounds, soft, nontender, nondistended, PD catheter in place, site is covered.   Musculoskeletal: No edema, no clubbing or cyanosis   Psychiatric: Appropriate affect, cooperative   Neurologic: Oriented x 3, moving all extremities, Cranial Nerves grossly intact, speech clear   Skin: warm and dry, no rashes, vitiligo.    Result Review    Result Reviewed:  I have personally reviewed the results from the time of this admission to 3/1/2024 17:50 EST and agree with these findings:  [x]  Laboratory  []  Microbiology  [x]  Radiology  []  EKG/Telemetry   []  Cardiology/Vascular   []  Pathology  []  Old records  []  Other:        Lab  03/01/24  0403 02/28/24  2254 02/24/24  0634 02/23/24  1842   SODIUM 136 139 138 139   POTASSIUM 3.9 4.1 3.5 3.5   CHLORIDE 100 99 102 102   CO2 25.1 28.7 25.5 25.3   BUN 33* 22 28* 30*   CREATININE 6.40* 6.04* 5.19* 4.82*   GLUCOSE 170* 164* 192* 187*   EGFR 8.3* 8.9* 10.7* 11.7*   ANION GAP 10.9 11.3 10.5 11.7   MAGNESIUM  --   --  1.7  --    PHOSPHORUS  --   --  4.2  --            Most notable findings include: TSH 8.7, hemoglobin 9.2.  Potassium 3.9.    Assessment & Plan   Assessment / Plan       Active Hospital Problems:  Active Hospital Problems    Diagnosis     **Acute on chronic respiratory failure with hypoxia     HCAP (healthcare-associated pneumonia)     ESRD (end stage renal disease)     Aspiration pneumonitis     Peritoneal dialysis catheter in place     Essential hypertension     Anemia due to chronic kidney disease     Type 2 diabetes mellitus without complication        Assessment and Plan:    - End-stage renal disease on peritoneal dialysis, currently using 1.5% dextrose solution on cycler per home prescription.  Daughter, Dr. Wang, is providing his PD at night.  Volume status may be slightly generous.  Will monitor today and may consider adding 2.5% dextrose solution tomorrow if more hypoxic or if having worse respiratory status.  Continue gentamicin 0.1% cream daily application to PD catheter exit site.  Continue with nystatin swish and swallow for fungal peritonitis prevention while on antibiotics.  - Pneumonia, status post bronchoscopy, per pulmonary.  Antibiotics and steroids currently.  - Hypertension, blood pressure is acceptable.  - Anemia of chronic kidney disease, hemoglobin below target.  On long-acting RONALD as outpatient.  Monitor for now.  - Diarrhea, seems to have resolved.  - Type 2 diabetes with complications.  Discussed with family.  Will follow.      Electronically signed by Coreen Castro MD, 3/1/2024, 17:50 EST.

## 2024-03-01 NOTE — ANESTHESIA PREPROCEDURE EVALUATION
Anesthesia Evaluation     Patient summary reviewed and Nursing notes reviewed   NPO Solid Status: > 8 hours  NPO Liquid Status: > 8 hours           Airway   Mallampati: II  TM distance: >3 FB  Neck ROM: full  No difficulty expected  Dental    (+) poor dentition    Pulmonary     breath sounds clear to auscultation  (+) pneumonia ,  Cardiovascular - normal exam  Exercise tolerance: poor (<4 METS)    ECG reviewed  Patient on routine beta blocker  Rhythm: regular  Rate: normal    (+) hypertension well controlled, dysrhythmias Bradycardia, hyperlipidemia      Neuro/Psych  GI/Hepatic/Renal/Endo    (+) renal disease (peritoneal dialysis - performed daily - last done this am 3/01)- ESRD and dialysis, diabetes mellitus type 2, thyroid problem hypothyroidism    Musculoskeletal     Abdominal    Substance History      OB/GYN          Other        ROS/Med Hx Other: Labs:   03/01/24 04:03  Anion Gap: 10.9  BUN: 33 (H)  Creatinine: 6.40 (H)  BUN/Creatinine Ratio: 5.2 (L)  eGFR: 8.3 (L)  Glucose: 170 (H)  Calcium: 8.0 (L)  K 3.9    CT chest 02/28/24:   Worsening progression is suggested by CT increased bilateral pulmonary consolidation, much greater on the right than the left.  The findings suggest infectious multifocal pneumonia.  Aspiration pneumonia be excluded (especially in the right lower lobe).  New or increased pleural effusions are seen (small in volume).  Gallstones are present without definite acute cholecystitis. Please see above comments for further detail.      EKG 02/28/24: HR 83,   Sinus rhythm  Prolonged VA interval  Low voltage, extremity and precordial leads  Nonspecific T abnrm, anterolateral leads  Minimal ST elevation, inferior leads  Prolonged QT interval    ECHO 02/16/24:   ·  Left ventricular systolic function is normal. Calculated left ventricular EF = 56.6%  ·  Left ventricular wall thickness is consistent with mild concentric hypertrophy.  ·  Left ventricular diastolic function is consistent with (grade  I) impaired relaxation and age.  ·  Aortic valve sclerosis with minimal aortic valve stenosis is present.            Phys Exam Other: 100 % on 3 LPM per NC   Vitiligo  Full beard   Peritoneal dialysis catheter in abdominal cavity  [place]              Anesthesia Plan    ASA 4     general   total IV anesthesia  (Total IV Anesthesia    Patient understands anesthesia not responsible for dental damage.  )  intravenous induction     Anesthetic plan, risks, benefits, and alternatives have been provided, discussed and informed consent has been obtained with: patient.  Pre-procedure education provided  Plan discussed with CRNA.      CODE STATUS:    Level Of Support Discussed With: Patient  Code Status (Patient has no pulse and is not breathing): CPR (Attempt to Resuscitate)  Medical Interventions (Patient has pulse or is breathing): Full Support

## 2024-03-01 NOTE — PLAN OF CARE
Patient taken down for bronchoscopy today. After returning for bronchoscopy patient was confused and lethargic. Patient lethargy and confusion resolved on it's own. Patient is now oriented to place, person, time, and situation. Patient is refusing to wear his oxygen. MD Friedman notified. Patient also stated he wanted to leave AMA. Patient's daughter spoke with the patient and he stated he will stay inpatient. Patient is restless/frequently attempts to sit on the side of bed or get out of bed. This RN educated patient on the risks of falls and using his call light. Patient is forgetful and continues to sit on the side or get out of bed. Bed alarm on and patient is close to the nurses station. Patient's daughter is at bedside and states she will start patient's home PD cycler tonight. All needs met at this time. Will continue to monitor patient.      Goal Outcome Evaluation:     Problem: Adult Inpatient Plan of Care  Goal: Plan of Care Review  Outcome: Ongoing, Progressing  Goal: Patient-Specific Goal (Individualized)  Outcome: Ongoing, Progressing  Goal: Absence of Hospital-Acquired Illness or Injury  Outcome: Ongoing, Progressing  Intervention: Identify and Manage Fall Risk  Recent Flowsheet Documentation  Taken 3/1/2024 1500 by Ruba Soto RN  Safety Promotion/Fall Prevention:   nonskid shoes/slippers when out of bed   safety round/check completed  Taken 3/1/2024 1409 by Ruba Soto RN  Safety Promotion/Fall Prevention:   nonskid shoes/slippers when out of bed   safety round/check completed  Taken 3/1/2024 1300 by Ruba Soto RN  Safety Promotion/Fall Prevention:   nonskid shoes/slippers when out of bed   safety round/check completed  Taken 3/1/2024 1214 by Ruba Soto RN  Safety Promotion/Fall Prevention:   nonskid shoes/slippers when out of bed   safety round/check completed  Taken 3/1/2024 1120 by Ruba Soto RN  Safety Promotion/Fall Prevention:   nonskid shoes/slippers when out of  bed   safety round/check completed  Taken 3/1/2024 0705 by Ruba Soto RN  Safety Promotion/Fall Prevention:   safety round/check completed   room organization consistent   nonskid shoes/slippers when out of bed   mobility aid in reach   lighting adjusted   fall prevention program maintained   clutter free environment maintained   assistive device/personal items within reach   activity supervised  Intervention: Prevent Skin Injury  Recent Flowsheet Documentation  Taken 3/1/2024 1409 by Ruba Soto RN  Body Position: position changed independently  Taken 3/1/2024 1300 by Ruba Soto RN  Body Position: position changed independently  Taken 3/1/2024 1214 by Ruba Soto RN  Body Position: position changed independently  Taken 3/1/2024 1120 by Ruba Soto RN  Body Position: position changed independently  Taken 3/1/2024 1035 by Ruba Soto RN  Body Position: position changed independently  Taken 3/1/2024 0705 by Ruba Soto RN  Body Position: position changed independently  Skin Protection:   tubing/devices free from skin contact   pulse oximeter probe site changed   incontinence pads utilized  Intervention: Prevent and Manage VTE (Venous Thromboembolism) Risk  Recent Flowsheet Documentation  Taken 3/1/2024 1409 by Ruba Soto RN  Activity Management: sitting, edge of bed  Taken 3/1/2024 0705 by Ruba Soto RN  Activity Management: activity encouraged  VTE Prevention/Management:   sequential compression devices off   patient refused intervention  Range of Motion: active ROM (range of motion) encouraged  Intervention: Prevent Infection  Recent Flowsheet Documentation  Taken 3/1/2024 1500 by Ruba Soto RN  Infection Prevention: rest/sleep promoted  Taken 3/1/2024 0705 by Ruba Soto RN  Infection Prevention:   rest/sleep promoted   single patient room provided   hand hygiene promoted  Goal: Optimal Comfort and Wellbeing  Outcome: Ongoing,  Progressing  Intervention: Provide Person-Centered Care  Recent Flowsheet Documentation  Taken 3/1/2024 1530 by Ruba Soto RN  Trust Relationship/Rapport:   care explained   choices provided   empathic listening provided   questions answered   questions encouraged   reassurance provided   thoughts/feelings acknowledged   emotional support provided  Taken 3/1/2024 0705 by Ruba Soto RN  Trust Relationship/Rapport:   care explained   emotional support provided   choices provided   empathic listening provided   questions answered   questions encouraged   reassurance provided   thoughts/feelings acknowledged  Goal: Readiness for Transition of Care  Outcome: Ongoing, Progressing     Problem: Skin Injury Risk Increased  Goal: Skin Health and Integrity  Outcome: Ongoing, Progressing  Intervention: Optimize Skin Protection  Recent Flowsheet Documentation  Taken 3/1/2024 1500 by Ruba Soto RN  Head of Bed (HOB) Positioning: HOB elevated  Taken 3/1/2024 1409 by Ruba Soto RN  Head of Bed (HOB) Positioning: HOB elevated  Taken 3/1/2024 1300 by Ruba Soto RN  Head of Bed (HOB) Positioning: HOB elevated  Taken 3/1/2024 1214 by Ruba Soto RN  Head of Bed (HOB) Positioning: HOB elevated  Taken 3/1/2024 1120 by Ruba Soto RN  Head of Bed (HOB) Positioning: HOB elevated  Taken 3/1/2024 1035 by Ruba Soto RN  Head of Bed (HOB) Positioning: HOB at 30-45 degrees  Taken 3/1/2024 0705 by Ruba Soto RN  Pressure Reduction Techniques:   frequent weight shift encouraged   heels elevated off bed  Head of Bed (HOB) Positioning: HOB at 30-45 degrees  Pressure Reduction Devices: pressure-redistributing mattress utilized  Skin Protection:   tubing/devices free from skin contact   pulse oximeter probe site changed   incontinence pads utilized     Problem: Diabetes Comorbidity  Goal: Blood Glucose Level Within Targeted Range  Outcome: Ongoing, Progressing     Problem: Hypertension  Comorbidity  Goal: Blood Pressure in Desired Range  Outcome: Ongoing, Progressing  Intervention: Maintain Blood Pressure Management  Recent Flowsheet Documentation  Taken 3/1/2024 1120 by Ruba Soto RN  Medication Review/Management: medications reviewed  Taken 3/1/2024 0705 by Ruba Soto RN  Medication Review/Management: medications reviewed     Problem: Fall Injury Risk  Goal: Absence of Fall and Fall-Related Injury  Outcome: Ongoing, Progressing  Intervention: Identify and Manage Contributors  Recent Flowsheet Documentation  Taken 3/1/2024 1120 by Ruba Soto RN  Medication Review/Management: medications reviewed  Taken 3/1/2024 0705 by Ruba Soto RN  Medication Review/Management: medications reviewed  Self-Care Promotion: independence encouraged  Intervention: Promote Injury-Free Environment  Recent Flowsheet Documentation  Taken 3/1/2024 1500 by Ruba Soto RN  Safety Promotion/Fall Prevention:   nonskid shoes/slippers when out of bed   safety round/check completed  Taken 3/1/2024 1409 by Ruba Soto RN  Safety Promotion/Fall Prevention:   nonskid shoes/slippers when out of bed   safety round/check completed  Taken 3/1/2024 1300 by Ruba Soto RN  Safety Promotion/Fall Prevention:   nonskid shoes/slippers when out of bed   safety round/check completed  Taken 3/1/2024 1214 by Ruba Soto RN  Safety Promotion/Fall Prevention:   nonskid shoes/slippers when out of bed   safety round/check completed  Taken 3/1/2024 1120 by Ruba Soto RN  Safety Promotion/Fall Prevention:   nonskid shoes/slippers when out of bed   safety round/check completed  Taken 3/1/2024 0705 by Ruba Soto RN  Safety Promotion/Fall Prevention:   safety round/check completed   room organization consistent   nonskid shoes/slippers when out of bed   mobility aid in reach   lighting adjusted   fall prevention program maintained   clutter free environment maintained   assistive  device/personal items within reach   activity supervised

## 2024-03-01 NOTE — ANESTHESIA POSTPROCEDURE EVALUATION
Patient: Nelson Wang    Procedure Summary       Date: 03/01/24 Room / Location: [facility] ENDOSCOPY 3 / [facility] ENDOSCOPY    Anesthesia Start: 0905 Anesthesia Stop: 0940    Procedure: BRONCHOSCOPY WITH BAL AND WASHINGS (Bronchus) Diagnosis:       Aspiration pneumonitis      (Aspiration pneumonitis [J69.0])    Surgeons: Sandra Espinal MD Provider: Ari Euceda CRNA    Anesthesia Type: general ASA Status: 4            Anesthesia Type: general    Vitals  Vitals Value Taken Time   /70 03/01/24 1001   Temp 36.8 °C (98.2 °F) 03/01/24 0954   Pulse 90 03/01/24 1002   Resp 23 03/01/24 0954   SpO2 96 % 03/01/24 1002   Vitals shown include unfiled device data.        Post Anesthesia Care and Evaluation    Post-procedure mental status: acceptable.  Pain management: satisfactory to patient    Airway patency: patent  Anesthetic complications: No anesthetic complications    Cardiovascular status: acceptable  Respiratory status: acceptable    Comments: Per chart review

## 2024-03-01 NOTE — PROGRESS NOTES
Pulmonary / Critical Care Progress Note      Patient Name: Nelson Wang  : 1945  MRN: [id number]  Attending:  Devante Friedman MD  Date of admission: 2024    Subjective   Subjective   Follow-up for pneumonia    Over past 24 hours:  No acute events overnight  Remains on 3 L nasal cannula  Getting ready for bronchoscopy   still coughing mostly nonproductive      Review of Systems  General: Denied complaints  Cardiovascular:  Denied complaints  Respiratory: Denied other complaints  Gastrointestinal: Denied complaints        Objective   Objective     Vitals:   Temp:  [97.3 °F (36.3 °C)-98.1 °F (36.7 °C)] 98 °F (36.7 °C)  Heart Rate:  [64-77] 72  Resp:  [12-20] 12  BP: (110-122)/(47-66) 114/53  Flow (L/min):  [3] 3    Physical Exam   Vital Signs Reviewed   General:  WDWN, Alert, NAD.  Elderly male, lying in bed  HEENT:  PERRL, EOMI.  OP, nares clear  Chest: Diminished to auscultation bilaterally, no work of breathing noted on 3 L nasal cannula  CV: RRR, no MGR, pulses 2+, equal.  Abd:  Soft, NT, ND, + BS  EXT:  no clubbing, no cyanosis, no edema  Neuro:  A&Ox3, CN grossly intact, no focal deficits.  Skin: No rashes or lesions noted      Result Review    Result Review:  I have personally reviewed the results from the time of this admission to 3/1/2024 08:54 EST and agree with these findings:  []  Laboratory  []  Microbiology  []  Radiology  []  EKG/Telemetry   []  Cardiology/Vascular   []  Pathology  []  Old records  []  Other:  Most notable findings include:   -     Assessment & Plan   Assessment / Plan     Active Hospital Problems:  Active Hospital Problems    Diagnosis     **Acute on chronic respiratory failure with hypoxia     HCAP (healthcare-associated pneumonia)     ESRD (end stage renal disease)     Aspiration pneumonitis     Peritoneal dialysis catheter in place     Essential hypertension     Anemia due to chronic kidney disease     Type 2 diabetes mellitus without complication           Impression:  Acute hypoxic respiratory failure requiring supplemental oxygen  Concern for aspiration pneumonia  NSTEMI type I versus type II  ESRD on peritoneal dialysis  Anemia of chronic kidney disease  T2DM with hyperglycemia     Plan:  -On 3 L nasal cannula.  Continue to wean supplemental oxygen to maintain SpO2 greater than 90%  -2/28 chest CT reviewed demonstrating RLL infiltrates concerning for aspiration pneumonia  -Recommend speech evaluation and dietary recommendations per them  -Will take for bronchoscopy with airway inspection, brushings, biopsies, bronchoalveolar lavage today. I have discussed the risks of the procedure with the patient including pneumothorax, hemothorax, bleeding, hypoxia, required mechanical ventilation and death. The patient recognizes these findings, acknowledges these findings and is agreeable to the procedure.  -Keep n.p.o. for bronchoscopy  -Micro negative to date.  Will DC vancomycin as MRSA nares negative.  Continue cefepime for now, de-escalate based on bronchoscopy findings  -Continue heparin drip per ACS, managed by cardiology  -Continue Brovana, Pulmicort, and DuoNebs  -Continue bronchopulmonary hygiene.  Encourage I-S and flutter  -Encourage activity as tolerated.  Out of bed to chair  -Continue aspiration precautions  -Cardiology following, appreciate input  -Nephrology following, appreciate input  -PD per nephrology    DVT prophylaxis:  Medical DVT prophylaxis orders are present.        CODE STATUS:   Level Of Support Discussed With: Patient  Code Status (Patient has no pulse and is not breathing): CPR (Attempt to Resuscitate)  Medical Interventions (Patient has pulse or is breathing): Full Support      Labs, images, and medications personally reviewed.    Discussed with patient    Electronically signed by Sandra Espinal MD, 03/01/24, 8:54 AM EST.

## 2024-03-01 NOTE — OP NOTE
Bronchoscopy Procedure Note    Procedure:  Bronchoscopy with bronchoalveolar lavage   Airway inspection with airway clearance of secretions.     Pre-Operative Diagnosis:  Pneumonia  Post-Operative Diagnosis: Same, mucous plugging    Indication:  Concern for aspiration pneumonia    Anesthesia: MAC anesthesia    Procedure Details: Patient was consented for the procedure with all risks and benefits of the procedure explained in detail. Patient was given the opportunity to ask questions and all concerns were answered.    The bronchoscope was inserted into the main airway via the mouth. An anatomical survey was done of the main airways and the subsegmental bronchus. The findings are reported below. A bronchoalveolar lavage was performed using 60 mL aliquots of normal saline instilled into the airways then aspirated back from RLL of lung with 30 mL serosanguineous fluid in return. Airway clearance of lung performed with suctioning and removal of secretions and mucous plugs.     Findings:  Extremely inflamed and friable mucosa, easy bleeding with suction  Edematous airways  Mucous plugging with purulent secretions    Estimated Blood Loss: 15 mL    Specimens:  BAL RLL  Bronchial washings tracheobronchial tree    Complications: None; patient tolerated the procedure well.    Disposition: To floor    Patient tolerated the procedure well.    Electronically signed by Sandra Espinal MD, 3/1/2024, 09:30 EST.

## 2024-03-01 NOTE — PROGRESS NOTES
[facility]   Hospitalist Progress Note  Date: 3/1/2024  Patient Name: Nelson Wang  : 1945  MRN: [id number]  Date of admission: 2024      Subjective   Subjective     Chief Complaint: Follow-up shortness of breath    Summary:Nelson Wang is a 79 y.o. male  with past medical history of diabetes, end-stage renal disease on peritoneal dialysis, immunosuppression on Skyrizi, hypertension, hyperlipidemia, hearing impairment, CAD, vertigo, GERD presents to the ED with shortness of breath.  Patient was discharged from this hospital 5 days ago to inpatient treatment of E. coli bacteremia with septic shock, C. difficile colitis, peritonitis and pneumonia included ICU management.  Since his discharge patient has been mostly bedbound and lethargic due to generalized weakness and shortness of breath.  Patient's daughter (Dr. Wang) was concerned so she brought him to the ED for further evaluation.  In the ED patient was hypoxic on arrival requiring oxygen supplementation via nasal cannula.  Labs showed that he had a mildly elevated lactic acid with significantly elevated proBNP and troponin levels.  ABG was relatively unremarkable on nasal cannula.  Repeat troponin had a significant delta of +232.  No ischemic changes on EKG.  Patient denied any chest pain.  CT of the chest showed worsening progression of multifocal pneumonia much greater on the right and new small pleural effusions.  Patient denies any fevers, chills, chest pain, nausea, or vomiting.  Patient did have diarrhea that has been improving over the last few days.  Patient was admitted for further evaluation and treatment.  Started on empiric antibiotics.  Pulmonology, nephrology and cardiology consulted.  Patient started on heparin drip.  Pulmonology took patient for bronchoscopy which showed extremely inflamed and friable mucosa and easy bleeding with edema of the airways and mucous plugging with purulent secretions.  BAL negative by PCR.   Cultures pending.  Cardiology planning on stress test as an outpatient after recovered from current illness    Interval Followup: Patient sitting up in bed appears to be resting fairly comfortably following bronchoscopy.  Patient more confused and anxious repeatedly asking for something to eat even after his brought several things to snack on.  Patient states he feels like his breathing is improved.  Afebrile overnight.  Sinus rhythm 70s to 90s on telemetry review.  Blood pressure within normal limits.  Patient refusing to keep the oxygen on and during interview satting well on room air at rest though desats easily with any activity per nursing requiring 2 to 3 L nasal cannula.  Blood sugars fairly well-controlled.  Heparin drip therapeutic.  Hemoglobin trending down slightly.  No other issues per nursing.    Review of Systems  Constitutional: Negative for fatigue and fever.   HENT: Negative for sore throat and trouble swallowing.    Eyes: Negative for pain and discharge.   Respiratory: Positive for cough and shortness of breath.    Cardiovascular: Negative for chest pain and palpitations.   Gastrointestinal: Negative for abdominal pain, nausea and vomiting.   Endocrine: Negative for cold intolerance and heat intolerance.   Genitourinary: Negative for difficulty urinating and dysuria.   Musculoskeletal: Negative for back pain and neck stiffness.   Skin: Negative for color change and rash.   Neurological: Negative for syncope and headaches.   Hematological: Negative for adenopathy.   Psychiatric/Behavioral: Positive for confusion and negative hallucinations.    Objective   Objective     Vitals:   Temp:  [97.3 °F (36.3 °C)-98.7 °F (37.1 °C)] 98.7 °F (37.1 °C)  Heart Rate:  [64-96] 81  Resp:  [12-24] 22  BP: ()/(51-81) 115/64  Flow (L/min):  [2-3] 2  Physical Exam   General: well-developed appearing stated age in no acute distress  HEENT: Normocephalic atraumatic moist membranes pupils equal round reactive light,  no scleral icterus no conjunctival injection  Cardiovascular: regular rate and rhythm no murmurs rubs or gallops S1-S2, no lower extremity edema appreciated  Pulmonary: Crackles bilateral bases scant rhonchi no wheezes symmetric chest expansion, unlabored, no conversational dyspnea appreciated  Gastrointestinal: Soft nontender nondistended positive bowel sounds all 4 quadrants no rebound or guarding  Musculoskeletal: No clubbing cyanosis, warm and well-perfused, calves soft symmetric nontender bilaterally  Skin: Clean dry  Neuro: Cranial nerves II through XII intact grossly no sensorimotor deficits appreciated bilateral upper and lower extremities  Psych: Patient is calm cooperative and appropriate with exam not responding to internal stimuli  : No Kolb catheter no bladder distention no suprapubic tenderness    Result Review    Result Review:  I have personally reviewed these results and agree with these findings:  [x]  Laboratory  LAB RESULTS:      Lab 03/01/24  1050 03/01/24  0403 03/01/24  0030 02/29/24  1644 02/29/24  1046 02/29/24  0431 02/29/24  0150 02/28/24  2150 02/24/24  0634 02/23/24  1842   WBC  --  7.46  --   --   --   --   --  10.09 8.74 7.86   HEMOGLOBIN  --  9.2*  --   --   --   --   --  10.9* 8.7* 8.8*   HEMATOCRIT  --  29.9*  --   --   --   --   --  34.8* 27.8* 28.5*   PLATELETS  --  155  --   --   --   --   --  230 155 150   NEUTROS ABS  --   --   --   --   --   --   --  7.14* 5.06 4.17   IMMATURE GRANS (ABS)  --   --   --   --   --   --   --  0.13* 0.78* 0.63*   LYMPHS ABS  --   --   --   --   --   --   --  1.53 1.78 1.69   MONOS ABS  --   --   --   --   --   --   --  0.79 0.69 0.95*   EOS ABS  --   --   --   --   --   --   --  0.45* 0.38 0.37   MCV  --  98.4*  --   --   --   --   --  96.4 97.5* 98.6*   CRP  --   --   --   --   --   --  5.42*  --   --   --    PROCALCITONIN  --   --   --   --   --   --  0.73*  --   --   --    LACTATE  --   --   --   --   --  1.8  --  2.6*  --   --    PROTIME   --   --   --   --   --  14.7  --   --   --   --    APTT 61.7*  --  67.6* 57.4* >200.0* 30.0*  --   --   --   --          Lab 03/01/24  0403 02/29/24  0150 02/28/24 2254 02/24/24  0634 02/23/24  1842   SODIUM 136  --  139 138 139   POTASSIUM 3.9  --  4.1 3.5 3.5   CHLORIDE 100  --  99 102 102   CO2 25.1  --  28.7 25.5 25.3   ANION GAP 10.9  --  11.3 10.5 11.7   BUN 33*  --  22 28* 30*   CREATININE 6.40*  --  6.04* 5.19* 4.82*   EGFR 8.3*  --  8.9* 10.7* 11.7*   GLUCOSE 170*  --  164* 192* 187*   CALCIUM 8.0*  --  8.3* 8.7 8.7   MAGNESIUM  --   --   --  1.7  --    PHOSPHORUS  --   --   --  4.2  --    TSH  --  8.760*  --   --   --          Lab 02/28/24 2254 02/24/24  0634 02/23/24  1842   TOTAL PROTEIN 5.9* 5.7* 5.9*   ALBUMIN 2.6* 2.4* 2.6*   GLOBULIN 3.3  --  3.3   ALT (SGPT) 13 14 14   AST (SGOT) 34 23 23   BILIRUBIN 0.3 0.2 0.2   BILIRUBIN DIRECT  --  <0.2  --    ALK PHOS 65 53 59         Lab 02/29/24  0431 02/29/24 0150 02/28/24 2254 02/28/24  2150   PROBNP  --   --   --  >70,000.0*   HSTROP T  --  428* 196*  --    PROTIME 14.7  --   --   --    INR 1.13  --   --   --                  Lab 02/29/24  0013   PH, ARTERIAL 7.394   PCO2, ARTERIAL 44.6   PO2 ART 84.0   O2 SATURATION ART 94.8*   FIO2 32   HCO3 ART 26.6*   BASE EXCESS ART 1.4   CARBOXYHEMOGLOBIN 0.1     Brief Urine Lab Results  (Last result in the past 365 days)        Color   Clarity   Blood   Leuk Est   Nitrite   Protein   CREAT   Urine HCG        02/16/24 1127 Dark Yellow   Clear   Negative   Negative   Negative   100 mg/dL (2+)                 Microbiology Results (last 10 days)       Procedure Component Value - Date/Time    Respiratory Culture - Wash, Bronchus [[id number]] Collected: 03/01/24 0932    Lab Status: Preliminary result Specimen: Wash from Bronchus Updated: 03/01/24 1144     Gram Stain Few (2+) Gram positive cocci in pairs and chains      Few (2+) WBCs seen    AFB Culture - Lavage, Lung, Right Lower Lobe [[id number]] Collected: 03/01/24  0922    Lab Status: Preliminary result Specimen: Lavage from Lung, Right Lower Lobe Updated: 03/01/24 1230     AFB Stain No acid fast bacilli seen on direct smear    BAL Culture, Quantitative - Lavage, Lung, Right Lower Lobe [[id number]] Collected: 03/01/24 0922    Lab Status: Preliminary result Specimen: Lavage from Lung, Right Lower Lobe Updated: 03/01/24 1149     Gram Stain Few (2+) Gram positive cocci in pairs and chains      Rare (1+) WBCs seen    Pneumonia Panel - Lavage, Lung, Right Lower Lobe [[id number]]  (Normal) Collected: 03/01/24 0922    Lab Status: Final result Specimen: Lavage from Lung, Right Lower Lobe Updated: 03/01/24 1145     Escherichia coli PCR Not Detected     Acinetobacter calcoaceticus-baumannii complex PCR Not Detected     Enterobacter cloacae PCR Not Detected     Klebsiella oxytoca PCR Not Detected     Klebsiella pneumoniae group PCR Not Detected     Klebsiella aerogenes PCR Not Detected     Moraxella catarrhalis PCR Not Detected     Proteus species PCR Not Detected     Pseudomonas aeroginosa PCR Not Detected     Serratia marcescens PCR Not Detected     Staphylococcus aureus PCR Not Detected     Streptococcus pyogenes PCR Not Detected     Haemophilus influenzae PCR Not Detected     Streptococcus agalactiae PCR Not Detected     Streptococcus pneumoniae PCR Not Detected     Chlamydophila pneumoniae PCR Not Detected     Legionella pneumophilia PCR Not Detected     Mycoplasma pneumo by PCR Not Detected     ADENOVIRUS, PCR Not Detected     CTX-M Gene N/A     IMP Gene N/A     KPC Gene N/A     mecA/C and MREJ Gene N/A     NDM Gene N/A     OXA-48-like Gene N/A     VIM Gene N/A     Coronavirus Not Detected     Human Metapneumovirus Not Detected     Human Rhinovirus/Enterovirus Not Detected     Influenza A PCR Not Detected     Influenza B PCR Not Detected     RSV, PCR Not Detected     Parainfluenza virus PCR Not Detected    Respiratory Panel PCR w/COVID-19(SARS-CoV-2) RA/[facility] In-House,  NP Swab in UTM/VTM, 2 HR TAT - Swab, Nasopharynx [[id number]]  (Normal) Collected: 02/29/24 0959    Lab Status: Final result Specimen: Swab from Nasopharynx Updated: 02/29/24 1131     ADENOVIRUS, PCR Not Detected     Coronavirus 229E Not Detected     Coronavirus HKU1 Not Detected     Coronavirus NL63 Not Detected     Coronavirus OC43 Not Detected     COVID19 Not Detected     Human Metapneumovirus Not Detected     Human Rhinovirus/Enterovirus Not Detected     Influenza A PCR Not Detected     Influenza B PCR Not Detected     Parainfluenza Virus 1 Not Detected     Parainfluenza Virus 2 Not Detected     Parainfluenza Virus 3 Not Detected     Parainfluenza Virus 4 Not Detected     RSV, PCR Not Detected     Bordetella pertussis pcr Not Detected     Bordetella parapertussis PCR Not Detected     Chlamydophila pneumoniae PCR Not Detected     Mycoplasma pneumo by PCR Not Detected    Narrative:      In the setting of a positive respiratory panel with a viral infection PLUS a negative procalcitonin without other underlying concern for bacterial infection, consider observing off antibiotics or discontinuation of antibiotics and continue supportive care. If the respiratory panel is positive for atypical bacterial infection (Bordetella pertussis, Chlamydophila pneumoniae, or Mycoplasma pneumoniae), consider antibiotic de-escalation to target atypical bacterial infection.    MRSA Screen, PCR (Inpatient) - Swab, Nares [[id number]]  (Normal) Collected: 02/29/24 0557    Lab Status: Final result Specimen: Swab from Nares Updated: 02/29/24 0826     MRSA PCR No MRSA Detected    Narrative:      The negative predictive value of this diagnostic test is high and should only be used to consider de-escalating anti-MRSA therapy. A positive result may indicate colonization with MRSA and must be correlated clinically.    COVID-19, FLU A/B, RSV PCR 1 HR TAT - Swab, Nasopharynx [[id number]]  (Normal) Collected: 02/28/24 2201    Lab Status: Final  result Specimen: Swab from Nasopharynx Updated: 02/28/24 2244     COVID19 Not Detected     Influenza A PCR Not Detected     Influenza B PCR Not Detected     RSV, PCR Not Detected    Narrative:      Fact sheet for providers: https://www.fda.gov/media/841258/download    Fact sheet for patients: https://www.fda.gov/media/952278/download    Test performed by PCR.    Blood Culture - Blood, Hand, Left [[id number]]  (Normal) Collected: 02/28/24 2150    Lab Status: Preliminary result Specimen: Blood from Hand, Left Updated: 02/29/24 2200     Blood Culture No growth at 24 hours    Blood Culture - Blood, Hand, Left [[id number]]  (Normal) Collected: 02/28/24 2150    Lab Status: Preliminary result Specimen: Blood from Hand, Left Updated: 02/29/24 2200     Blood Culture No growth at 24 hours    Gastrointestinal Panel, PCR - Stool, Per Rectum [[id number]]  (Normal) Collected: 02/21/24 1402    Lab Status: Final result Specimen: Stool from Per Rectum Updated: 02/21/24 1827     Campylobacter Not Detected     Plesiomonas shigelloides Not Detected     Salmonella Not Detected     Vibrio Not Detected     Vibrio cholerae Not Detected     Yersinia enterocolitica Not Detected     Enteroaggregative E. coli (EAEC) Not Detected     Enteropathogenic E. coli (EPEC) Not Detected     Enterotoxigenic E. coli (ETEC) lt/st Not Detected     Shiga-like toxin-producing E. coli (STEC) stx1/stx2 Not Detected     Shigella/Enteroinvasive E. coli (EIEC) Not Detected     Cryptosporidium Not Detected     Cyclospora cayetanensis Not Detected     Entamoeba histolytica Not Detected     Giardia lamblia Not Detected     Adenovirus F40/41 Not Detected     Astrovirus Not Detected     Norovirus GI/GII Not Detected     Rotavirus A Not Detected     Sapovirus (I, II, IV or V) Not Detected    Narrative:      If Aeromonas, Staphylococcus aureus or Bacillus cereus are suspected, please order [id number]: Stool Culture, Aeromonas, S aureus, B Cereus.            [x]   Microbiology  [x]  Radiology  CT Chest Without Contrast Diagnostic    Result Date: 2/29/2024   Worsening progression is suggested by CT increased bilateral pulmonary consolidation, much greater on the right than the left.  The findings suggest infectious multifocal pneumonia.  Aspiration pneumonia be excluded (especially in the right lower lobe).  New or increased pleural effusions are seen (small in volume).  Gallstones are present without definite acute cholecystitis.  Please see above comments for further detail.     Please note that portions of this note were completed with a voice recognition program.  АНДРЕЙ DE LUNA JR, MD       Electronically Signed and Approved By: АНДРЕЙ DE LUNA JR, MD on 2/29/2024 at 0:10              XR Chest 1 View    Result Date: 2/28/2024   Improving right basilar density compatible with persistent but resolving pneumonia.  The right IJ central venous catheter has been removed.  No new findings.       RENITA BARRAZA DO       Electronically Signed and Approved By: RENITA BARRAZA DO on 2/28/2024 at 22:19             CT Abdomen Pelvis With Contrast    Result Date: 2/22/2024    1. Persistent right lower lobe pneumonia within new tiny right pleural effusion. 2. No clearly acute findings in the GI tract.  No evidence of acute colitis or enteritis.  No bowel obstruction.  There is colonic diverticulosis without diverticulitis. 3. Stable nonobstructing large stone in the right renal pelvis.  No hydronephrosis on either side. 4. Cholelithiasis without biliary obstruction. 5. Increase volume of free fluid in the abdomen and pelvis, likely dialysate. 6. Appendectomy and right hip arthroplasty.      ERIC ELLISON MD       Electronically Signed and Approved By: ERIC ELLISON MD on 2/22/2024 at 22:44              [x]  EKG/Telemetry   []  Cardiology/Vascular   []  Pathology  []  Old records  [x]  Other:  Scheduled Meds:!Patient Home Medications Stored on Unit, , Does not apply, BID  arformoterol,  15 mcg, Nebulization, BID - RT  aspirin, 81 mg, Oral, Daily  atorvastatin, 40 mg, Oral, Daily  budesonide, 0.5 mg, Nebulization, BID - RT  cefepime, 2,000 mg, Intravenous, Q24H  gentamicin, 1 Application, Topical, Daily  guaiFENesin, 600 mg, Oral, Q12H  heparin (porcine), 5,000 Units, Subcutaneous, Q8H  dianeal lo-peter 1.5%, 6,000 mL, Intraperitoneal, Daily  insulin regular, 2-9 Units, Subcutaneous, Q6H  levothyroxine, 125 mcg, Oral, Q AM  melatonin, 5 mg, Oral, Nightly  nystatin, 5 mL, Swish & Swallow, 4x Daily  saccharomyces boulardii, 250 mg, Oral, BID  sevelamer, 1,600 mg, Oral, BID With Meals  sodium chloride, 10 mL, Intravenous, Q12H      Continuous Infusions:   PRN Meds:.  acetaminophen    benzonatate    senna-docusate sodium **AND** polyethylene glycol **AND** bisacodyl **AND** bisacodyl    dextrose    dextrose    glucagon (human recombinant)    ipratropium-albuterol    ondansetron    sodium chloride    sodium chloride    sodium chloride      Assessment & Plan   Assessment / Plan     Assessment/Plan:  Acute on chronic hypoxic respiratory failure  Concern for worsening multifocal pneumonia versus aspiration pneumonia  Mucous plugging of the airway  Elevated troponin unsure if type I or type II NSTEMI  End-stage renal disease on peritoneal dialysis  Diabetes mellitus  Hypertension  Hypothyroidism  Recent E. coli bacteremia  Recent C. difficile colitis      Patient admitted for further evaluation and treatment  Pulmonology critical care, nephrology, cardiology consulted thank you for assistance  Continue supplemental oxygen as needed to keep sats greater than 90%  Continue cefepime empirically de-escalate per pulmonology  Follow-up BAL cultures  Continue Brovana, Pulmicort  Continue bronchopulmonary hygiene protocol  Continue chest physiotherapy  Stop heparin drip  Continue aspirin atorvastatin  Follow-up with cardiology for outpatient stress testing once recovered from current illness  Continue PD with home  cycler at night per nephrology  Continue sevelamer  Continue sliding scale insulin  Continue home Synthroid  Continue probiotics while taking antibiotics  Further inpatient orders recommendations pending clinical course         Discussed plan with bedside RN.    Disposition: Home once cleared by pulmonology to follow-up with pulmonology and cardiology as an outpatient.  Patient will need outpatient stress testing per cardiology.    DVT prophylaxis:  Medical DVT prophylaxis orders are present.        CODE STATUS:   Level Of Support Discussed With: Patient  Code Status (Patient has no pulse and is not breathing): CPR (Attempt to Resuscitate)  Medical Interventions (Patient has pulse or is breathing): Full Support

## 2024-03-02 LAB
ANION GAP SERPL CALCULATED.3IONS-SCNC: 10 MMOL/L (ref 5–15)
BUN SERPL-MCNC: 35 MG/DL (ref 8–23)
BUN/CREAT SERPL: 5 (ref 7–25)
CALCIUM SPEC-SCNC: 7.9 MG/DL (ref 8.6–10.5)
CHLORIDE SERPL-SCNC: 102 MMOL/L (ref 98–107)
CO2 SERPL-SCNC: 26 MMOL/L (ref 22–29)
CREAT SERPL-MCNC: 7.02 MG/DL (ref 0.76–1.27)
DEPRECATED RDW RBC AUTO: 61.1 FL (ref 37–54)
EGFRCR SERPLBLD CKD-EPI 2021: 7.4 ML/MIN/1.73
ERYTHROCYTE [DISTWIDTH] IN BLOOD BY AUTOMATED COUNT: 17.2 % (ref 12.3–15.4)
GLUCOSE BLDC GLUCOMTR-MCNC: 121 MG/DL (ref 70–99)
GLUCOSE BLDC GLUCOMTR-MCNC: 143 MG/DL (ref 70–99)
GLUCOSE BLDC GLUCOMTR-MCNC: 152 MG/DL (ref 70–99)
GLUCOSE BLDC GLUCOMTR-MCNC: 163 MG/DL (ref 70–99)
GLUCOSE SERPL-MCNC: 142 MG/DL (ref 65–99)
HCT VFR BLD AUTO: 29.4 % (ref 37.5–51)
HGB BLD-MCNC: 8.8 G/DL (ref 13–17.7)
MAGNESIUM SERPL-MCNC: 1.6 MG/DL (ref 1.6–2.4)
MCH RBC QN AUTO: 29.3 PG (ref 26.6–33)
MCHC RBC AUTO-ENTMCNC: 29.9 G/DL (ref 31.5–35.7)
MCV RBC AUTO: 98 FL (ref 79–97)
PHOSPHATE SERPL-MCNC: 5.3 MG/DL (ref 2.5–4.5)
PLATELET # BLD AUTO: 128 10*3/MM3 (ref 140–450)
PMV BLD AUTO: 10.7 FL (ref 6–12)
POTASSIUM SERPL-SCNC: 3.5 MMOL/L (ref 3.5–5.2)
RBC # BLD AUTO: 3 10*6/MM3 (ref 4.14–5.8)
SODIUM SERPL-SCNC: 138 MMOL/L (ref 136–145)
WBC NRBC COR # BLD AUTO: 7.45 10*3/MM3 (ref 3.4–10.8)

## 2024-03-02 PROCEDURE — 25010000002 MAGNESIUM SULFATE 2 GM/50ML SOLUTION: Performed by: INTERNAL MEDICINE

## 2024-03-02 PROCEDURE — 84100 ASSAY OF PHOSPHORUS: CPT | Performed by: INTERNAL MEDICINE

## 2024-03-02 PROCEDURE — 25010000002 AMPICILLIN-SULBACTAM PER 1.5 G: Performed by: INTERNAL MEDICINE

## 2024-03-02 PROCEDURE — 80048 BASIC METABOLIC PNL TOTAL CA: CPT | Performed by: STUDENT IN AN ORGANIZED HEALTH CARE EDUCATION/TRAINING PROGRAM

## 2024-03-02 PROCEDURE — 94799 UNLISTED PULMONARY SVC/PX: CPT

## 2024-03-02 PROCEDURE — 94664 DEMO&/EVAL PT USE INHALER: CPT

## 2024-03-02 PROCEDURE — 63710000001 INSULIN REGULAR HUMAN PER 5 UNITS: Performed by: STUDENT IN AN ORGANIZED HEALTH CARE EDUCATION/TRAINING PROGRAM

## 2024-03-02 PROCEDURE — 94669 MECHANICAL CHEST WALL OSCILL: CPT

## 2024-03-02 PROCEDURE — 94761 N-INVAS EAR/PLS OXIMETRY MLT: CPT

## 2024-03-02 PROCEDURE — 85027 COMPLETE CBC AUTOMATED: CPT | Performed by: STUDENT IN AN ORGANIZED HEALTH CARE EDUCATION/TRAINING PROGRAM

## 2024-03-02 PROCEDURE — 99233 SBSQ HOSP IP/OBS HIGH 50: CPT | Performed by: INTERNAL MEDICINE

## 2024-03-02 PROCEDURE — 82948 REAGENT STRIP/BLOOD GLUCOSE: CPT

## 2024-03-02 PROCEDURE — 83735 ASSAY OF MAGNESIUM: CPT | Performed by: INTERNAL MEDICINE

## 2024-03-02 PROCEDURE — 25010000002 HEPARIN (PORCINE) PER 1000 UNITS: Performed by: INTERNAL MEDICINE

## 2024-03-02 RX ORDER — QUETIAPINE FUMARATE 25 MG/1
12.5 TABLET, FILM COATED ORAL NIGHTLY
Status: DISCONTINUED | OUTPATIENT
Start: 2024-03-02 | End: 2024-03-05

## 2024-03-02 RX ORDER — MAGNESIUM SULFATE HEPTAHYDRATE 40 MG/ML
2 INJECTION, SOLUTION INTRAVENOUS ONCE
Status: COMPLETED | OUTPATIENT
Start: 2024-03-02 | End: 2024-03-02

## 2024-03-02 RX ADMIN — HEPARIN SODIUM 5000 UNITS: 5000 INJECTION INTRAVENOUS; SUBCUTANEOUS at 05:42

## 2024-03-02 RX ADMIN — GUAIFENESIN 600 MG: 600 TABLET ORAL at 20:44

## 2024-03-02 RX ADMIN — Medication 250 MG: at 09:39

## 2024-03-02 RX ADMIN — ACETAMINOPHEN 650 MG: 325 TABLET ORAL at 17:12

## 2024-03-02 RX ADMIN — AMPICILLIN AND SULBACTAM 3 G: 2; 1 INJECTION, POWDER, FOR SOLUTION INTRAVENOUS at 09:38

## 2024-03-02 RX ADMIN — SEVELAMER CARBONATE 1600 MG: 800 TABLET, FILM COATED ORAL at 18:26

## 2024-03-02 RX ADMIN — NYSTATIN 500000 UNITS: 100000 SUSPENSION ORAL at 20:44

## 2024-03-02 RX ADMIN — Medication 10 ML: at 09:40

## 2024-03-02 RX ADMIN — LEVOTHYROXINE SODIUM 125 MCG: 125 TABLET ORAL at 05:42

## 2024-03-02 RX ADMIN — HEPARIN SODIUM 5000 UNITS: 5000 INJECTION INTRAVENOUS; SUBCUTANEOUS at 22:50

## 2024-03-02 RX ADMIN — NYSTATIN 500000 UNITS: 100000 SUSPENSION ORAL at 09:38

## 2024-03-02 RX ADMIN — Medication 5 MG: at 20:45

## 2024-03-02 RX ADMIN — HEPARIN SODIUM 5000 UNITS: 5000 INJECTION INTRAVENOUS; SUBCUTANEOUS at 13:37

## 2024-03-02 RX ADMIN — MAGNESIUM SULFATE HEPTAHYDRATE 2 G: 2 INJECTION, SOLUTION INTRAVENOUS at 09:38

## 2024-03-02 RX ADMIN — ARFORMOTEROL TARTRATE 15 MCG: 15 SOLUTION RESPIRATORY (INHALATION) at 07:56

## 2024-03-02 RX ADMIN — Medication 250 MG: at 20:44

## 2024-03-02 RX ADMIN — SEVELAMER CARBONATE 1600 MG: 800 TABLET, FILM COATED ORAL at 09:39

## 2024-03-02 RX ADMIN — BUDESONIDE 0.5 MG: 0.5 INHALANT RESPIRATORY (INHALATION) at 19:41

## 2024-03-02 RX ADMIN — INSULIN HUMAN 2 UNITS: 100 INJECTION, SOLUTION PARENTERAL at 05:43

## 2024-03-02 RX ADMIN — ATORVASTATIN CALCIUM 40 MG: 40 TABLET, FILM COATED ORAL at 09:39

## 2024-03-02 RX ADMIN — GENTAMICIN SULFATE 1 APPLICATION: 1 CREAM TOPICAL at 09:39

## 2024-03-02 RX ADMIN — ASPIRIN 81 MG: 81 TABLET, COATED ORAL at 09:39

## 2024-03-02 RX ADMIN — NYSTATIN 500000 UNITS: 100000 SUSPENSION ORAL at 18:26

## 2024-03-02 RX ADMIN — ARFORMOTEROL TARTRATE 15 MCG: 15 SOLUTION RESPIRATORY (INHALATION) at 19:41

## 2024-03-02 RX ADMIN — GUAIFENESIN 600 MG: 600 TABLET ORAL at 09:39

## 2024-03-02 RX ADMIN — SODIUM CHLORIDE, SODIUM LACTATE, CALCIUM CHLORIDE, MAGNESIUM CHLORIDE AND DEXTROSE 6000 ML: 1.5; 538; 448; 18.3; 5.08 INJECTION, SOLUTION INTRAPERITONEAL at 21:26

## 2024-03-02 RX ADMIN — Medication 10 ML: at 20:45

## 2024-03-02 RX ADMIN — BUDESONIDE 0.5 MG: 0.5 INHALANT RESPIRATORY (INHALATION) at 07:56

## 2024-03-02 RX ADMIN — QUETIAPINE FUMARATE 12.5 MG: 25 TABLET ORAL at 20:45

## 2024-03-02 NOTE — PROGRESS NOTES
Pulmonary / Critical Care Progress Note      Patient Name: Nelson Wang  : 1945  MRN: [id number]  Attending:  Devante Friedman MD  Date of admission: 2024    Subjective   Subjective   Follow-up for pneumonia    Over past 24 hours: Underwent bronchoscopy    This morning,  On 2 L nasal cannula  Lying in bed  Reports feeling better after bronchoscopy  Denies any chest pain or chest tightness  Denies cough or hemoptysis  No fever or chills    Objective   Objective     Vitals:   Temp:  [97.9 °F (36.6 °C)-98.9 °F (37.2 °C)] 98.9 °F (37.2 °C)  Heart Rate:  [67-81] 78  Resp:  [17-22] 17  BP: (108-115)/(47-64) 108/53  Flow (L/min):  [2-3] 2    Physical Exam   Vital Signs Reviewed   General:  WDWN, Alert, NAD.  Is an elderly male, lying in bed  HEENT:  PERRL, EOMI.  OP, nares clear  Chest: Diminished to auscultation bilaterally, n no work of breathing noted on 2 L nasal cannula  CV: RRR, no MGR, pulses 2+, equal.  Abd:  Soft, NT, ND, + BS  EXT:  no clubbing, no cyanosis, no edema  Neuro:  A&Ox3, CN grossly intact, no focal deficits.  Skin: No rashes or lesions noted, multiple wounds noted please see charting    Result Review    Result Review:  I have personally reviewed the results from the time of this admission to 3/2/2024 13:01 EST and agree with these findings:  [x]  Laboratory  [x]  Microbiology  [x]  Radiology  []  EKG/Telemetry   []  Cardiology/Vascular   []  Pathology  []  Old records  []  Other:  Most notable findings include:         Lab 24  0527 24  0403 24  2254 24  2150   WBC 7.45 7.46  --  10.09   HEMOGLOBIN 8.8* 9.2*  --  10.9*   HEMATOCRIT 29.4* 29.9*  --  34.8*   PLATELETS 128* 155  --  230   SODIUM 138 136 139  --    POTASSIUM 3.5 3.9 4.1  --    CHLORIDE 102 100 99  --    CO2 26.0 25.1 28.7  --    BUN 35* 33* 22  --    CREATININE 7.02* 6.40* 6.04*  --    GLUCOSE 142* 170* 164*  --    CALCIUM 7.9* 8.0* 8.3*  --    PHOSPHORUS 5.3*  --   --   --    TOTAL PROTEIN  --   --   5.9*  --    ALBUMIN  --   --  2.6*  --    GLOBULIN  --   --  3.3  --      Bronchoscopy BAL negative.  Bronchoscopy note personally reviewed  Assessment & Plan   Assessment / Plan     Active Hospital Problems:  Active Hospital Problems    Diagnosis     **Acute on chronic respiratory failure with hypoxia     HCAP (healthcare-associated pneumonia)     ESRD (end stage renal disease)     Aspiration pneumonitis     Peritoneal dialysis catheter in place     Essential hypertension     Anemia due to chronic kidney disease     Type 2 diabetes mellitus without complication      Impression:  Acute hypoxic respiratory failure requiring supplemental oxygen  Concern for aspiration pneumonia  NSTEMI type I versus type II  ESRD on peritoneal dialysis  Anemia of chronic kidney disease  T2DM with hyperglycemia  Hypomagnesemia     Plan:  -On 2 L nasal cannula.  Continue insulin oxygen to maintain SpO2 greater than 90%.  Does not wear any home O2 at baseline  -Patient is s/p bronchoscopy on 3/1.  Micro negative to date  -Continue Unasyn for total 7 days in the setting of aspiration pneumonia  -Continue Brovana, Pulmicort, DuoNebs  -Continue chest physiotherapy  -Continue bronchopulmonary hygiene.  Encourage I-S and flutter  -Continue to monitor renal panel and electrolytes.  Replaced magnesium intravenously  -Continue aspiration precautions  -SLP following, appreciate input  -Peritoneal dialysis per nephrology, appreciate input     DVT prophylaxis:  Medical DVT prophylaxis orders are present.    CODE STATUS:   Level Of Support Discussed With: Patient  Code Status (Patient has no pulse and is not breathing): CPR (Attempt to Resuscitate)  Medical Interventions (Patient has pulse or is breathing): Full Support      Labs, images, and medications personally reviewed.  Discussed with patient    I, Dr. Scottie Valentin, have spent more than 50% of the total time managing the patient in this encounter today.  This included personally reviewing  all pertinent labs, imaging, microbiology and documentation. Also discussing the case with the patient and any available family, the admitting physician and any available ancillary staff.    Electronically signed by BECKY Rosenthal, 03/02/24, 12:29 PM EST.  Electronically signed by Scottie Valentin MD, 03/02/24, 1:02 PM EST.

## 2024-03-02 NOTE — ED PROVIDER NOTES
Time: 1:45 PM EST  Date of encounter:  2/28/2024  Independent Historian/Clinical History and Information was obtained by:   Patient and Family  Chief Complaint: Shortness of air    History is limited by: N/A    History of Present Illness:  Patient is a 79 y.o. year old male who presents to the emergency department for evaluation of shortness of air.  Patient was recently hospitalized and had a right lower lobe pneumonia.  Patient also had E. coli sepsis.  Patient has been recovering well at home until today when he had increasing shortness of breath.  Patient is still receiving daily Rocephin injections.  Patient has a cough.  Patient's pulse ox at home was 80%.  Patient denies any fevers.  Patient has increasing weakness.    HPI    Patient Care Team  Primary Care Provider: Janna Mo MD    Past Medical History:     No Known Allergies  Past Medical History:   Diagnosis Date    Anemia     Ankle pain, right     CKD (chronic kidney disease), stage IV     Diabetes     Gout     High cholesterol     HL (hearing loss)     Hyperkalemia     Hypertension     Hypothyroidism     Psoriasis     Vitiligo      Past Surgical History:   Procedure Laterality Date    ANKLE SURGERY  11/1990    APPENDECTOMY N/A 1954    COLONOSCOPY N/A 06/2022    [facility]    HIP BIPOLAR REPLACEMENT Right 01/2000    INSERTION PERITONEAL DIALYSIS CATHETER N/A 3/27/2023    Procedure: LAPAROSCOPIC INSERTION PERITONEAL DIALYSIS CATHETER, LAPAROSCOPIC OMENTOPEXY WITH LYSIS OF ADHESIONS;  Surgeon: Jose Berry MD;  Location: [facility];  Service: General;  Laterality: N/A;    INSERTION PERITONEAL DIALYSIS CATHETER Left 7/23/2023    Procedure: REVISION OF PERITONEAL DIALYSIS CATHETER;  Surgeon: Radha Oreilly MD;  Location: [facility];  Service: General;  Laterality: Left;    RENAL BIOPSY Left 07/15/2022     Family History   Problem Relation Age of Onset    Diabetes Mother     Heart disease Father     Cancer Sister 35     Diabetes Sister     Diabetes Brother     Heart disease Paternal Uncle     Malvan Hyperthermia Neg Hx        Home Medications:  Prior to Admission medications    Medication Sig Start Date End Date Taking? Authorizing Provider   allopurinol (ZYLOPRIM) 300 MG tablet Take 1 tablet by mouth Daily. 1/5/23  Yes Serena Matute MD   ascorbic acid (VITAMIN C) 1000 MG tablet Take 1 tablet by mouth Daily.   Yes Serena Matute MD   carvedilol (COREG) 12.5 MG tablet Take 1 tablet by mouth 2 (Two) Times a Day for 30 days. 2/24/24 3/25/24 Yes Saúl Vargas MD   cefTRIAXone (ROCEPHIN) 1 g injection Infuse 2 g into a venous catheter Daily for 11 days. 2/23/24 3/5/24 Yes Saúl Vargas MD   Insulin Glargine (LANTUS SOLOSTAR) 100 UNIT/ML injection pen Inject 15 Units under the skin into the appropriate area as directed Every Night.   Yes Serena Matute MD   levothyroxine (SYNTHROID, LEVOTHROID) 125 MCG tablet Take 1 tablet by mouth Daily. 8/2/23  Yes Janna Mo MD   Omega-3 Fatty Acids (fish oil) 1000 MG capsule capsule Take 2 capsules by mouth 2 (Two) Times a Day.   Yes Serena Matute MD   sevelamer (RENVELA) 800 MG tablet Take 2 tablets by mouth 2 (Two) Times a Day With Meals. 12/7/23  Yes Serena Matute MD   sodium bicarbonate 650 MG tablet Take 2 tablets by mouth 2 (Two) Times a Day for 30 days. 2/24/24 3/25/24 Yes Saúl Vargas MD   torsemide (DEMADEX) 20 MG tablet Take 2 tablets by mouth Every Morning. Resume use of Torsemide after diarrhea subsides 3/1/24  Yes Saúl Vargas MD   Turmeric 500 MG capsule Take 1 capsule by mouth 2 (Two) Times a Day.   Yes Serena Matute MD   atorvastatin (LIPITOR) 40 MG tablet Take 1 tablet by mouth Daily. 1/8/24   Janna Mo MD   Lidocaine, Anorectal, (LMX 5) 5 % cream cream Apply 1 g topically to the appropriate area as directed 3 (Three) Times a Day As Needed (Perineal pain). 2/24/24   Saúl Vargas MD  "  Risankizumab-rzaa (SKYRIZI, 150 MG DOSE, SC) Inject  under the skin into the appropriate area as directed. Once every 3 months    Provider, MD Serena   sertraline (ZOLOFT) 100 MG tablet Take 1 tablet by mouth Every Night. 23   Scottie Lundberg APRN        Social History:   Social History     Tobacco Use    Smoking status: Former     Current packs/day: 0.00     Average packs/day: 1 pack/day for 10.0 years (10.0 ttl pk-yrs)     Types: Cigarettes     Start date:      Quit date: 2000     Years since quittin.1     Passive exposure: Past    Smokeless tobacco: Never    Tobacco comments:     quit 25 years ago, chews nicotine gum   Vaping Use    Vaping status: Never Used   Substance Use Topics    Alcohol use: Yes     Comment: 1 DRINK/DAY    Drug use: Never         Review of Systems:  Review of Systems   Constitutional:  Negative for chills and fever.   HENT:  Negative for congestion, ear pain and sore throat.    Eyes:  Negative for pain.   Respiratory:  Positive for cough. Negative for chest tightness and shortness of breath.    Cardiovascular:  Negative for chest pain.   Gastrointestinal:  Negative for abdominal pain, diarrhea, nausea and vomiting.   Genitourinary:  Negative for flank pain and hematuria.   Musculoskeletal:  Negative for joint swelling.   Skin:  Negative for pallor.   Neurological:  Positive for weakness. Negative for seizures and headaches.   All other systems reviewed and are negative.       Physical Exam:  /53 (BP Location: Right arm, Patient Position: Sitting)   Pulse 78   Temp 98.9 °F (37.2 °C) (Oral)   Resp 17   Ht 167.6 cm (66\")   Wt 83.4 kg (183 lb 13.8 oz)   SpO2 99%   BMI 29.68 kg/m²     Physical Exam    Vital signs were reviewed under triage note.  General appearance - Patient appears well-developed and well-nourished.  Patient is in no acute distress.  Patient is ill-appearing.  Head - Normocephalic, atraumatic.  Pupils - Equal, round, reactive to light.  " Extraocular muscles are intact.  Conjunctiva is clear.  Nasal - Normal inspection.  No evidence of trauma or epistaxis.  Tympanic membranes - Gray, intact without erythema or retractions.  Oral mucosa - Pink and moist without lesions or erythema.  Uvula is midline.  Chest wall - Atraumatic.  Chest wall is nontender.  There are no vesicular rashes noted.  Neck - Supple.  Trachea was midline.  There is no palpable lymphadenopathy or thyromegaly.  There are no meningeal signs  Lungs - Auscultation reveals bibasilar rhonchi however there is more on the right base than left base.  Heart - Regular rate and rhythm without any murmurs, clicks, or gallops.  Abdomen - Soft.  Bowel sounds are present.  There is no palpable tenderness.  There is no rebound, guarding, or rigidity.  There are no palpable masses.  There are no pulsatile masses.  Back - Spine is straight and midline.  There is no CVA tenderness.  Extremities - Intact x4 with full range of motion.  There is no palpable edema.  Pulses are intact x4 and equal.  Neurologic - Patient is awake, alert, and oriented x3.  Cranial nerves II through XII are grossly intact.  Motor and sensory functions grossly intact.  Cerebellar function was normal.  Integument - There are no rashes.  There are no petechia or purpura lesions noted.  There are no vesicular lesions noted.           Procedures:  Procedures      Medical Decision Making:      Comorbidities that affect care:    End-stage renal disease on peritoneal dialysis, C. difficile, recent E. coli sepsis, hypertension, hyperlipidemia, diabetes, hypothyroidism, gout, vitiligo, psoriasis, anemia    External Notes reviewed:    Hospital Discharge Summary: Hospital discharge summary dated 2/24/2024 was reviewed by me.      The following orders were placed and all results were independently analyzed by me:  Orders Placed This Encounter   Procedures    Blood Culture - Blood,    Blood Culture - Blood,    COVID-19, FLU A/B, RSV PCR 1  HR TAT - Swab, Nasopharynx    Respiratory Culture - Sputum, Cough    MRSA Screen, PCR (Inpatient) - Swab, Nares    Respiratory Panel PCR w/COVID-19(SARS-CoV-2) RA/TANIA/GAVIN/PAD/COR/NENA In-House, NP Swab in UTM/VTM, 2 HR TAT - Swab, Nasopharynx    S. Pneumo Ag Urine or CSF - Urine, Urine, Clean Catch    Legionella Antigen, Urine - Urine, Urine, Clean Catch    Fungus Culture - Lavage, Lung, Right Lower Lobe    Virus Culture - Lavage, Lung, Right Lower Lobe    AFB Culture - Lavage, Lung, Right Lower Lobe    BAL Culture, Quantitative - Lavage, Lung, Right Lower Lobe    Pneumonia Panel - Lavage, Lung, Right Lower Lobe    Cytomegalovirus, PCR - Lavage, Lung, Right Lower Lobe    Respiratory Culture - Wash, Bronchus    XR Chest 1 View    CT Chest Without Contrast Diagnostic    [place] Draw    Comprehensive Metabolic Panel    BNP    Single High Sensitivity Troponin T    Lactic Acid, Plasma    CBC Auto Differential    Occult Blood, Fecal By Immunoassay - Stool, Per Rectum    High Sensitivity Troponin T 2Hr    Blood Gas, Arterial -With Co-Ox Panel: Yes    TSH Rfx On Abnormal To Free T4    Basic Metabolic Panel    CBC (No Diff)    Protime-INR    aPTT    T4, Free    STAT Lactic Acid, Reflex    Procalcitonin    C-reactive Protein    aPTT    aPTT    aPTT    aPTT    Bronch Wash/BAL Differential - Lavage, Lung, Right Lower Lobe    Magnesium    Phosphorus    Diet: Diabetic Diets; Consistent Carbohydrate; No Straw; Texture: Regular Texture (IDDSI 7); Fluid Consistency: Nectar Thick    Undress & Gown    Vital Signs    Vital Signs    Intake & Output    Weigh Patient    Oral Care    Saline Lock & Maintain IV Access    Activity - Ad Jing    Continuous pulse oximetry    Weigh Patient    Intake and Output    PD Catheter Site Care    Intake and Output    PD Catheter Site Care    Follow Anesthesia Guidlines / Standing Orders    Obtain Informed Consent    Code Status and Medical Interventions:    Hospitalist (on-call MD unless specified)     Inpatient Pulmonology Consult    Inpatient Cardiology Consult    Inpatient Nephrology Consult    OT Consult: Eval & Treat ADL Performance Below Baseline    PT Consult: Eval & Treat Functional Mobility Below Baseline    Oxygen Therapy- Nasal Cannula; Titrate 1-6 LPM Per SpO2; 90 - 95%    Incentive Spirometry    Oxygen Therapy- Nasal Cannula; Titrate 1-6 LPM Per SpO2; 90 - 95%    RT to Initiate Bronchopulmonary Hygiene Protocol    Oscillating Positive Expiratory Pressure (OPEP)    Chest Physiotherapy (PD & P)    SLP Consult: Eval & Treat Swallow Disorder    POC Glucose 4x Daily Before Meals & at Bedtime    POC Glucose Once    POC Glucose Once    POC Glucose Once    POC Glucose Once    POC Glucose Once    POC Glucose Once    POC Glucose Once    POC Glucose Once    POC Glucose Once    POC Glucose Once    ECG 12 Lead ED Triage Standing Order; SOA    Insert Peripheral IV    Insert Peripheral IV    Insert Peripheral IV    Continuous Ambulatory Peritoneal Dialysis    Continuous Cyclic Peritoneal Dialysis (CCPD)    Inpatient Admission    CBC & Differential    Green Top (Gel)    Lavender Top    Gold Top - SST    Light Blue Top       Medications Given in the Emergency Department:  Medications   sodium chloride 0.9 % flush 10 mL (has no administration in time range)   sodium chloride 0.9 % infusion (50 mL/hr Intravenous Not Given 2/29/24 0558)   saccharomyces boulardii (FLORASTOR) capsule 250 mg (250 mg Oral Given 3/2/24 0939)   sodium chloride 0.9 % flush 10 mL (10 mL Intravenous Given 3/2/24 0940)   sodium chloride 0.9 % flush 10 mL (has no administration in time range)   sodium chloride 0.9 % infusion 40 mL (500 mL Intravenous Given 3/1/24 0850)   sevelamer (RENVELA) tablet 1,600 mg (1,600 mg Oral Given 3/2/24 0939)   levothyroxine (SYNTHROID, LEVOTHROID) tablet 125 mcg (125 mcg Oral Given 3/2/24 0542)   guaiFENesin (MUCINEX) 12 hr tablet 600 mg (600 mg Oral Given 3/2/24 0939)   benzonatate (TESSALON) capsule 100 mg (has no  administration in time range)   ipratropium-albuterol (DUO-NEB) nebulizer solution 3 mL (has no administration in time range)   sennosides-docusate (PERICOLACE) 8.6-50 MG per tablet 2 tablet (has no administration in time range)     And   polyethylene glycol (MIRALAX) packet 17 g (has no administration in time range)     And   bisacodyl (DULCOLAX) EC tablet 5 mg (has no administration in time range)     And   bisacodyl (DULCOLAX) suppository 10 mg (has no administration in time range)   ondansetron (ZOFRAN) injection 4 mg (has no administration in time range)   dextrose (GLUTOSE) oral gel 15 g (has no administration in time range)   dextrose (D50W) (25 g/50 mL) IV injection 25 g (has no administration in time range)   glucagon (GLUCAGEN) injection 1 mg (has no administration in time range)   insulin regular (humuLIN R,novoLIN R) injection 2-9 Units ( Subcutaneous Not Given 3/2/24 1219)   aspirin EC tablet 81 mg (81 mg Oral Given 3/2/24 0939)   gentamicin (GARAMYCIN) 0.1 % cream 1 Application (1 Application Topical Given 3/2/24 0939)   nystatin (MYCOSTATIN) 100,000 unit/mL suspension 500,000 Units (500,000 Units Swish & Swallow Not Given 3/2/24 1201)   arformoterol (BROVANA) nebulizer solution 15 mcg (15 mcg Nebulization Given 3/2/24 0756)   budesonide (PULMICORT) nebulizer solution 0.5 mg (0.5 mg Nebulization Given 3/2/24 0756)   atorvastatin (LIPITOR) tablet 40 mg (40 mg Oral Given 3/2/24 0939)   acetaminophen (TYLENOL) tablet 650 mg (650 mg Oral Given 3/1/24 2137)   melatonin tablet 5 mg (5 mg Oral Given 3/1/24 2115)   HOME SUPPLY - lo-peter lo-mag 1.5% dextrose (DELFLEX) peritoneal dialysate soln (6,000 mL Intraperitoneal New Bag 3/1/24 2156)   !Patient Home Medications Stored on Unit ( Does not apply Not Given 3/2/24 0940)   heparin (porcine) 5000 UNIT/ML injection 5,000 Units (5,000 Units Subcutaneous Given 3/2/24 3047)   ampicillin-sulbactam 3 g/100 mL 0.9% NS IVPB (3 g Intravenous New Bag 3/2/24 2574)    cefepime (MAXIPIME) 1000 mg/100 mL 0.9% NS IVPB (mbp) (0 g Intravenous Stopped 2/29/24 0125)   vancomycin 1750 mg/500 mL 0.9% NS IVPB ([facility]) (0 mg Intravenous Stopped 2/29/24 0315)   melatonin tablet 5 mg (5 mg Oral Given 2/29/24 0315)   cefepime (MAXIPIME) 1000 mg/100 mL 0.9% NS IVPB (mbp) (1,000 mg Intravenous New Bag 2/29/24 1119)   magnesium sulfate 2g/50 mL (PREMIX) infusion (2 g Intravenous New Bag 3/2/24 0938)        ED Course:      The patient was seen and evaluated in the ED by me.  The above history and physical examination was performed as documented.  Diagnostic data was obtained.  Results reviewed.  Patient appears to have worsening pneumonic infiltrates with now bibasilar infiltrates.  CT scan was performed.  Patient was started on broad-spectrum antibiotics.  Fluids restricted due to his dialysis.  Hospital service was consulted for admission.  Patient was also placed on supplemental O2 to correct his hypoxemia    Labs:    Lab Results (last 24 hours)       Procedure Component Value Units Date/Time    POC Glucose Once [[id number]]  (Abnormal) Collected: 03/01/24 1652    Specimen: Blood Updated: 03/01/24 1654     Glucose 153 mg/dL      Comment: Serial Number: [id number]:  [id number]       POC Glucose Once [[id number]]  (Abnormal) Collected: 03/01/24 2317    Specimen: Blood Updated: 03/02/24 0000     Glucose 143 mg/dL      Comment: Serial Number: [id number]:  [id number]       POC Glucose Once [[id number]]  (Abnormal) Collected: 03/02/24 0508    Specimen: Blood Updated: 03/02/24 0525     Glucose 163 mg/dL      Comment: Serial Number: [id number]:  [id number]       Basic Metabolic Panel [[id number]]  (Abnormal) Collected: 03/02/24 0527    Specimen: Blood from Arm, Left Updated: 03/02/24 0614     Glucose 142 mg/dL      BUN 35 mg/dL      Creatinine 7.02 mg/dL      Sodium 138 mmol/L      Potassium 3.5 mmol/L      Chloride 102 mmol/L      CO2 26.0 mmol/L      Calcium 7.9 mg/dL      BUN/Creatinine  Ratio 5.0     Anion Gap 10.0 mmol/L      eGFR 7.4 mL/min/1.73      Comment: <15 Indicative of kidney failure       Narrative:      GFR Normal >60  Chronic Kidney Disease <60  Kidney Failure <15    The GFR formula is only valid for adults with stable renal function between ages 18 and 70.    CBC (No Diff) [[id number]]  (Abnormal) Collected: 03/02/24 0527    Specimen: Blood from Arm, Left Updated: 03/02/24 0547     WBC 7.45 10*3/mm3      RBC 3.00 10*6/mm3      Hemoglobin 8.8 g/dL      Hematocrit 29.4 %      MCV 98.0 fL      MCH 29.3 pg      MCHC 29.9 g/dL      RDW 17.2 %      RDW-SD 61.1 fl      MPV 10.7 fL      Platelets 128 10*3/mm3     Magnesium [[id number]]  (Normal) Collected: 03/02/24 0527    Specimen: Blood from Arm, Left Updated: 03/02/24 0614     Magnesium 1.6 mg/dL     Phosphorus [[id number]]  (Abnormal) Collected: 03/02/24 0527    Specimen: Blood from Arm, Left Updated: 03/02/24 0737     Phosphorus 5.3 mg/dL     POC Glucose Once [[id number]]  (Abnormal) Collected: 03/02/24 1210    Specimen: Blood Updated: 03/02/24 1212     Glucose 121 mg/dL      Comment: Serial Number: [id number]:  [id number]                Imaging:    No Radiology Exams Resulted Within Past 24 Hours      Differential Diagnosis and Discussion:    Cough: Differential diagnosis includes but is not limited to pneumonia, acute bronchitis, upper respiratory infection, ACE inhibitor use, allergic reaction, epiglottitis, seasonal allergies, chemical irritants, exercise-induced asthma, viral syndrome.  Dyspnea: Differential diagnosis includes but is not limited to metabolic acidosis, neurological disorders, psychogenic, asthma, pneumothorax, upper airway obstruction, COPD, pneumonia, noncardiogenic pulmonary edema, interstitial lung disease, anemia, congestive heart failure, and pulmonary embolism    All labs were reviewed and interpreted by me.  All X-rays impressions were independently interpreted by me.  EKG was interpreted by me.  CT scan  radiology impression was interpreted by me.    MDM     Amount and/or Complexity of Data Reviewed  Clinical lab tests: reviewed  Tests in the radiology section of CPT®: reviewed  Tests in the medicine section of CPT®: reviewed             Patient Care Considerations:    SEPSIS FLUIDS: 30 cc/kg bolus was not indicated in this patient due to patient having end-stage renal disease on peritoneal dialysis      Consultants/Shared Management Plan:    Hospitalist: I have discussed the case with Dr. Tavares who agrees to accept the patient for admission.    Social Determinants of Health:    Patient has presented with family members who are responsible, reliable and will ensure follow up care.      Disposition and Care Coordination:    Admit:   Through independent evaluation of the patient's history, physical, and imperical data, the patient meets criteria for inpatient admission to the hospital.        Final diagnoses:   Acute respiratory failure with hypoxia   Multifocal pneumonia        ED Disposition       ED Disposition   Decision to Admit    Condition   --    Comment   Level of Care: Telemetry [5]   Diagnosis: HCAP (healthcare-associated pneumonia) [[id number]]   Admitting Physician: NI TAVARES [[id number]]   Certification: I Certify That Inpatient Hospital Services Are Medically Necessary For Greater Than 2 Midnights                 This medical record created using voice recognition software.             Abdoul Adan DO  03/02/24 1400

## 2024-03-02 NOTE — PROGRESS NOTES
[facility]     Nephrology Progress Note      Patient Name: Nelson Wang  : 1945  MRN: [id number]  Primary Care Physician:  Janna Mo MD  Date of admission: 2024    Subjective   Subjective     Interval History:  Patient sitting on side of bed, denies any chest pain shortness of breath nausea or vomiting currently    Objective   Objective     Vitals:   Temp:  [97.9 °F (36.6 °C)-98.9 °F (37.2 °C)] 98.7 °F (37.1 °C)  Heart Rate:  [67-79] 78  Resp:  [17-20] 18  BP: (101-113)/(47-58) 101/58  Flow (L/min):  [2] 2  Physical Exam:   Constitutional: Awake, alert, no distress.   Respiratory: Patient no rhonchi only   Cardiovascular: RRR, no murmurs, rubs, or gallops.   Gastrointestinal: Positive bowel sounds, soft, nontender, nondistended, PD catheter in place, site is covered.   Musculoskeletal: No edema,or cyanosis   Psychiatric: Appropriate affect, cooperative   Neurologic: Alert and oriented grossly intact    Skin: warm and dry, no rashes, vitiligo.    Result Review    Result Reviewed:  I have personally reviewed the results from the time of this admission to 3/2/2024 15:25 EST and agree with these findings:  [x]  Laboratory  []  Microbiology  [x]  Radiology  []  EKG/Telemetry   []  Cardiology/Vascular   []  Pathology  []  Old records  []  Other:        Lab 24  0527 24  0403 24  2254   SODIUM 138 136 139   POTASSIUM 3.5 3.9 4.1   CHLORIDE 102 100 99   CO2 26.0 25.1 28.7   BUN 35* 33* 22   CREATININE 7.02* 6.40* 6.04*   GLUCOSE 142* 170* 164*   EGFR 7.4* 8.3* 8.9*   ANION GAP 10.0 10.9 11.3   MAGNESIUM 1.6  --   --    PHOSPHORUS 5.3*  --   --            Most notable findings include: TSH 8.7, hemoglobin 9.2.  Potassium 3.9.    Assessment & Plan   Assessment / Plan       Active Hospital Problems:  Active Hospital Problems    Diagnosis     **Acute on chronic respiratory failure with hypoxia     HCAP (healthcare-associated pneumonia)     ESRD (end stage renal disease)      Aspiration pneumonitis     Peritoneal dialysis catheter in place     Essential hypertension     Anemia due to chronic kidney disease     Type 2 diabetes mellitus without complication        Assessment and Plan:    - End-stage renal disease on peritoneal dialysis, currently using 1.5% dextrose solution on cycler per home prescription.  Daughter, Dr. Wang, is providing his PD at night.  Volume status seems better today.  Continue gentamicin 0.1% cream daily application to PD catheter exit site.  Continue with nystatin swish and swallow for fungal peritonitis prevention while on antibiotics.    - Pneumonia, status post bronchoscopy, per pulmonary.  Antibiotics and steroids currently.    - Hypertension, blood pressure is acceptable.    - Anemia of chronic kidney disease, hemoglobin below target.  On long-acting RONALD as outpatient.  Monitor for now.  Hemoglobin slightly decreased    - Type 2 diabetes with complications.  .      Electronically signed by Galileo Cook MD, 3/2/2024, 15:25 EST.

## 2024-03-02 NOTE — PROGRESS NOTES
[facility]   Hospitalist Progress Note  Date: 3/2/2024  Patient Name: Nelson Wang  : 1945  MRN: [id number]  Date of admission: 2024      Subjective   Subjective     Chief Complaint: Follow-up shortness of breath    Summary:Nelson Wang is a 79 y.o. male  with past medical history of diabetes, end-stage renal disease on peritoneal dialysis, immunosuppression on Skyrizi, hypertension, hyperlipidemia, hearing impairment, CAD, vertigo, GERD presents to the ED with shortness of breath.  Patient was discharged from this hospital 5 days ago to inpatient treatment of E. coli bacteremia with septic shock, C. difficile colitis, peritonitis and pneumonia included ICU management.  Since his discharge patient has been mostly bedbound and lethargic due to generalized weakness and shortness of breath.  Patient's daughter (Dr. Wang) was concerned so she brought him to the ED for further evaluation.  In the ED patient was hypoxic on arrival requiring oxygen supplementation via nasal cannula.  Labs showed that he had a mildly elevated lactic acid with significantly elevated proBNP and troponin levels.  ABG was relatively unremarkable on nasal cannula.  Repeat troponin had a significant delta of +232.  No ischemic changes on EKG.  Patient denied any chest pain.  CT of the chest showed worsening progression of multifocal pneumonia much greater on the right and new small pleural effusions.  Patient denies any fevers, chills, chest pain, nausea, or vomiting.  Patient did have diarrhea that has been improving over the last few days.  Patient was admitted for further evaluation and treatment.  Started on empiric antibiotics.  Pulmonology, nephrology and cardiology consulted.  Patient started on heparin drip.  Pulmonology took patient for bronchoscopy which showed extremely inflamed and friable mucosa and easy bleeding with edema of the airways and mucous plugging with purulent secretions.  BAL negative by PCR.   Cultures pending.  Cardiology planning on stress test as an outpatient after recovered from current illness    Interval Followup: Patient sitting up in bed appears to be resting fairly comfortably with family at the bedside.  Patient more confused and anxious overnight.  Patient states he feels like his breathing is better.  Afebrile overnight.  Sinus rhythm 70s to 80s on telemetry review.  Blood pressure within normal limits.  Satting well on 2 L nasal cannula.  Blood sugars fairly well-controlled.  Hemoglobin trending down slightly.  Platelets trending down no other issues per nursing.    Review of Systems  Constitutional: Negative for fatigue and fever.   HENT: Negative for sore throat and trouble swallowing.    Eyes: Negative for pain and discharge.   Respiratory: Positive for cough and shortness of breath.    Cardiovascular: Negative for chest pain and palpitations.   Gastrointestinal: Negative for abdominal pain, nausea and vomiting.   Endocrine: Negative for cold intolerance and heat intolerance.   Genitourinary: Negative for difficulty urinating and dysuria.   Musculoskeletal: Negative for back pain and neck stiffness.   Skin: Negative for color change and rash.   Neurological: Negative for syncope and headaches.   Hematological: Negative for adenopathy.   Psychiatric/Behavioral: Positive for confusion and negative hallucinations.    Objective   Objective     Vitals:   Temp:  [97.9 °F (36.6 °C)-98.9 °F (37.2 °C)] 98.7 °F (37.1 °C)  Heart Rate:  [67-79] 78  Resp:  [17-20] 18  BP: (101-113)/(47-58) 101/58  Flow (L/min):  [2] 2  Physical Exam   General: well-developed appearing stated age in no acute distress  HEENT: Normocephalic atraumatic moist membranes pupils equal round reactive light, no scleral icterus no conjunctival injection  Cardiovascular: regular rate and rhythm no murmurs rubs or gallops S1-S2, no lower extremity edema appreciated  Pulmonary: Crackles bilateral bases scant rhonchi no wheezes  symmetric chest expansion, unlabored, no conversational dyspnea appreciated  Gastrointestinal: Soft nontender nondistended positive bowel sounds all 4 quadrants no rebound or guarding  Musculoskeletal: No clubbing cyanosis, warm and well-perfused, calves soft symmetric nontender bilaterally  Skin: Clean dry  Neuro: Cranial nerves II through XII intact grossly no sensorimotor deficits appreciated bilateral upper and lower extremities  Psych: Patient is calm cooperative and appropriate with exam not responding to internal stimuli  : No Kolb catheter no bladder distention no suprapubic tenderness    Result Review    Result Review:  I have personally reviewed these results and agree with these findings:  [x]  Laboratory  LAB RESULTS:      Lab 03/02/24  0527 03/01/24  1050 03/01/24  0403 03/01/24  0030 02/29/24  1644 02/29/24  1046 02/29/24  0431 02/29/24  0150 02/28/24  2150   WBC 7.45  --  7.46  --   --   --   --   --  10.09   HEMOGLOBIN 8.8*  --  9.2*  --   --   --   --   --  10.9*   HEMATOCRIT 29.4*  --  29.9*  --   --   --   --   --  34.8*   PLATELETS 128*  --  155  --   --   --   --   --  230   NEUTROS ABS  --   --   --   --   --   --   --   --  7.14*   IMMATURE GRANS (ABS)  --   --   --   --   --   --   --   --  0.13*   LYMPHS ABS  --   --   --   --   --   --   --   --  1.53   MONOS ABS  --   --   --   --   --   --   --   --  0.79   EOS ABS  --   --   --   --   --   --   --   --  0.45*   MCV 98.0*  --  98.4*  --   --   --   --   --  96.4   CRP  --   --   --   --   --   --   --  5.42*  --    PROCALCITONIN  --   --   --   --   --   --   --  0.73*  --    LACTATE  --   --   --   --   --   --  1.8  --  2.6*   PROTIME  --   --   --   --   --   --  14.7  --   --    APTT  --  61.7*  --  67.6* 57.4* >200.0* 30.0*  --   --          Lab 03/02/24  0527 03/01/24  0403 02/29/24  0150 02/28/24  2254   SODIUM 138 136  --  139   POTASSIUM 3.5 3.9  --  4.1   CHLORIDE 102 100  --  99   CO2 26.0 25.1  --  28.7   ANION GAP 10.0  10.9  --  11.3   BUN 35* 33*  --  22   CREATININE 7.02* 6.40*  --  6.04*   EGFR 7.4* 8.3*  --  8.9*   GLUCOSE 142* 170*  --  164*   CALCIUM 7.9* 8.0*  --  8.3*   MAGNESIUM 1.6  --   --   --    PHOSPHORUS 5.3*  --   --   --    TSH  --   --  8.760*  --          Lab 02/28/24  2254   TOTAL PROTEIN 5.9*   ALBUMIN 2.6*   GLOBULIN 3.3   ALT (SGPT) 13   AST (SGOT) 34   BILIRUBIN 0.3   ALK PHOS 65         Lab 02/29/24  0431 02/29/24  0150 02/28/24  2254 02/28/24  2150   PROBNP  --   --   --  >70,000.0*   HSTROP T  --  428* 196*  --    PROTIME 14.7  --   --   --    INR 1.13  --   --   --                  Lab 02/29/24  0013   PH, ARTERIAL 7.394   PCO2, ARTERIAL 44.6   PO2 ART 84.0   O2 SATURATION ART 94.8*   FIO2 32   HCO3 ART 26.6*   BASE EXCESS ART 1.4   CARBOXYHEMOGLOBIN 0.1     Brief Urine Lab Results  (Last result in the past 365 days)        Color   Clarity   Blood   Leuk Est   Nitrite   Protein   CREAT   Urine HCG        02/16/24 1127 Dark Yellow   Clear   Negative   Negative   Negative   100 mg/dL (2+)                 Microbiology Results (last 10 days)       Procedure Component Value - Date/Time    Respiratory Culture - Wash, Bronchus [[id number]] Collected: 03/01/24 0932    Lab Status: Preliminary result Specimen: Wash from Bronchus Updated: 03/02/24 1113     Respiratory Culture Growth present, too young to evaluate     Gram Stain Few (2+) Gram positive cocci in pairs and chains      Few (2+) WBCs seen    AFB Culture - Lavage, Lung, Right Lower Lobe [[id number]] Collected: 03/01/24 0922    Lab Status: Preliminary result Specimen: Lavage from Lung, Right Lower Lobe Updated: 03/01/24 1230     AFB Stain No acid fast bacilli seen on direct smear    BAL Culture, Quantitative - Lavage, Lung, Right Lower Lobe [[id number]] Collected: 03/01/24 0922    Lab Status: Preliminary result Specimen: Lavage from Lung, Right Lower Lobe Updated: 03/02/24 1113     BAL Culture Growth present, too young to evaluate     Gram Stain Few (2+)  Gram positive cocci in pairs and chains      Rare (1+) WBCs seen    Pneumonia Panel - Lavage, Lung, Right Lower Lobe [[id number]]  (Normal) Collected: 03/01/24 0922    Lab Status: Final result Specimen: Lavage from Lung, Right Lower Lobe Updated: 03/01/24 1145     Escherichia coli PCR Not Detected     Acinetobacter calcoaceticus-baumannii complex PCR Not Detected     Enterobacter cloacae PCR Not Detected     Klebsiella oxytoca PCR Not Detected     Klebsiella pneumoniae group PCR Not Detected     Klebsiella aerogenes PCR Not Detected     Moraxella catarrhalis PCR Not Detected     Proteus species PCR Not Detected     Pseudomonas aeroginosa PCR Not Detected     Serratia marcescens PCR Not Detected     Staphylococcus aureus PCR Not Detected     Streptococcus pyogenes PCR Not Detected     Haemophilus influenzae PCR Not Detected     Streptococcus agalactiae PCR Not Detected     Streptococcus pneumoniae PCR Not Detected     Chlamydophila pneumoniae PCR Not Detected     Legionella pneumophilia PCR Not Detected     Mycoplasma pneumo by PCR Not Detected     ADENOVIRUS, PCR Not Detected     CTX-M Gene N/A     IMP Gene N/A     KPC Gene N/A     mecA/C and MREJ Gene N/A     NDM Gene N/A     OXA-48-like Gene N/A     VIM Gene N/A     Coronavirus Not Detected     Human Metapneumovirus Not Detected     Human Rhinovirus/Enterovirus Not Detected     Influenza A PCR Not Detected     Influenza B PCR Not Detected     RSV, PCR Not Detected     Parainfluenza virus PCR Not Detected    Respiratory Panel PCR w/COVID-19(SARS-CoV-2) RA/TANIA/GAVIN/PAD/COR/NENA In-House, NP Swab in UTM/VTM, 2 HR TAT - Swab, Nasopharynx [[id number]]  (Normal) Collected: 02/29/24 0959    Lab Status: Final result Specimen: Swab from Nasopharynx Updated: 02/29/24 1131     ADENOVIRUS, PCR Not Detected     Coronavirus 229E Not Detected     Coronavirus HKU1 Not Detected     Coronavirus NL63 Not Detected     Coronavirus OC43 Not Detected     COVID19 Not Detected     Human  Metapneumovirus Not Detected     Human Rhinovirus/Enterovirus Not Detected     Influenza A PCR Not Detected     Influenza B PCR Not Detected     Parainfluenza Virus 1 Not Detected     Parainfluenza Virus 2 Not Detected     Parainfluenza Virus 3 Not Detected     Parainfluenza Virus 4 Not Detected     RSV, PCR Not Detected     Bordetella pertussis pcr Not Detected     Bordetella parapertussis PCR Not Detected     Chlamydophila pneumoniae PCR Not Detected     Mycoplasma pneumo by PCR Not Detected    Narrative:      In the setting of a positive respiratory panel with a viral infection PLUS a negative procalcitonin without other underlying concern for bacterial infection, consider observing off antibiotics or discontinuation of antibiotics and continue supportive care. If the respiratory panel is positive for atypical bacterial infection (Bordetella pertussis, Chlamydophila pneumoniae, or Mycoplasma pneumoniae), consider antibiotic de-escalation to target atypical bacterial infection.    MRSA Screen, PCR (Inpatient) - Swab, Nares [[id number]]  (Normal) Collected: 02/29/24 0557    Lab Status: Final result Specimen: Swab from Nares Updated: 02/29/24 0826     MRSA PCR No MRSA Detected    Narrative:      The negative predictive value of this diagnostic test is high and should only be used to consider de-escalating anti-MRSA therapy. A positive result may indicate colonization with MRSA and must be correlated clinically.    COVID-19, FLU A/B, RSV PCR 1 HR TAT - Swab, Nasopharynx [[id number]]  (Normal) Collected: 02/28/24 2201    Lab Status: Final result Specimen: Swab from Nasopharynx Updated: 02/28/24 2244     COVID19 Not Detected     Influenza A PCR Not Detected     Influenza B PCR Not Detected     RSV, PCR Not Detected    Narrative:      Fact sheet for providers: https://www.fda.gov/media/634000/download    Fact sheet for patients: https://www.fda.gov/media/367112/download    Test performed by PCR.    Blood Culture - Blood,  Hand, Left [[id number]]  (Normal) Collected: 02/28/24 2150    Lab Status: Preliminary result Specimen: Blood from Hand, Left Updated: 03/01/24 2200     Blood Culture No growth at 2 days    Blood Culture - Blood, Hand, Left [[id number]]  (Normal) Collected: 02/28/24 2150    Lab Status: Preliminary result Specimen: Blood from Hand, Left Updated: 03/01/24 2200     Blood Culture No growth at 2 days            [x]  Microbiology  [x]  Radiology  CT Chest Without Contrast Diagnostic    Result Date: 2/29/2024   Worsening progression is suggested by CT increased bilateral pulmonary consolidation, much greater on the right than the left.  The findings suggest infectious multifocal pneumonia.  Aspiration pneumonia be excluded (especially in the right lower lobe).  New or increased pleural effusions are seen (small in volume).  Gallstones are present without definite acute cholecystitis.  Please see above comments for further detail.     Please note that portions of this note were completed with a voice recognition program.  АНДРЕЙ DE LUNA JR, MD       Electronically Signed and Approved By: АНДРЕЙ DE LUNA JR, MD on 2/29/2024 at 0:10              XR Chest 1 View    Result Date: 2/28/2024   Improving right basilar density compatible with persistent but resolving pneumonia.  The right IJ central venous catheter has been removed.  No new findings.       RENITA BARRAZA DO       Electronically Signed and Approved By: RENITA BARRAZA DO on 2/28/2024 at 22:19              [x]  EKG/Telemetry   []  Cardiology/Vascular   []  Pathology  []  Old records  [x]  Other:  Scheduled Meds:!Patient Home Medications Stored on Unit, , Does not apply, BID  ampicillin-sulbactam, 3 g, Intravenous, Q24H  arformoterol, 15 mcg, Nebulization, BID - RT  aspirin, 81 mg, Oral, Daily  atorvastatin, 40 mg, Oral, Daily  budesonide, 0.5 mg, Nebulization, BID - RT  gentamicin, 1 Application, Topical, Daily  guaiFENesin, 600 mg, Oral, Q12H  heparin (porcine), 5,000  Units, Subcutaneous, Q8H  dianeal lo-peter 1.5%, 6,000 mL, Intraperitoneal, Daily  insulin regular, 2-9 Units, Subcutaneous, Q6H  levothyroxine, 125 mcg, Oral, Q AM  melatonin, 5 mg, Oral, Nightly  nystatin, 5 mL, Swish & Swallow, 4x Daily  saccharomyces boulardii, 250 mg, Oral, BID  sevelamer, 1,600 mg, Oral, BID With Meals  sodium chloride, 10 mL, Intravenous, Q12H      Continuous Infusions:   PRN Meds:.  acetaminophen    benzonatate    senna-docusate sodium **AND** polyethylene glycol **AND** bisacodyl **AND** bisacodyl    dextrose    dextrose    glucagon (human recombinant)    ipratropium-albuterol    ondansetron    sodium chloride    sodium chloride    sodium chloride      Assessment & Plan   Assessment / Plan     Assessment/Plan:  Acute on chronic hypoxic respiratory failure  Concern for worsening multifocal pneumonia versus aspiration pneumonia  Mucous plugging of the airway  Elevated troponin unsure if type I or type II NSTEMI  End-stage renal disease on peritoneal dialysis  Diabetes mellitus  Hypertension  Hypothyroidism  Recent E. coli bacteremia  Recent C. difficile colitis      Patient admitted for further evaluation and treatment  Pulmonology critical care, nephrology, cardiology consulted thank you for assistance  Continue supplemental oxygen as needed to keep sats greater than 90%  Patient antibiotics to ampicillin-sulbactam per pulmonology  Follow-up BAL cultures  Continue Brovana, Pulmicort  Continue bronchopulmonary hygiene protocol  Continue chest physiotherapy  Continue aspirin atorvastatin  Follow-up with cardiology for outpatient stress testing once recovered from current illness  Continue PD with home cycler at night per nephrology  Continue sevelamer  Continue sliding scale insulin  Continue home Synthroid  Continue probiotics while taking antibiotics  Further inpatient orders recommendations pending clinical course         Discussed plan with bedside RN as well as Dr. Valentin  pulmonology.    Disposition: Home once cleared by pulmonology to follow-up with pulmonology and cardiology as an outpatient.  Patient will need outpatient stress testing per cardiology.    DVT prophylaxis:  Medical DVT prophylaxis orders are present.        CODE STATUS:   Level Of Support Discussed With: Patient  Code Status (Patient has no pulse and is not breathing): CPR (Attempt to Resuscitate)  Medical Interventions (Patient has pulse or is breathing): Full Support

## 2024-03-03 LAB
ANION GAP SERPL CALCULATED.3IONS-SCNC: 12.8 MMOL/L (ref 5–15)
BACTERIA SPEC AEROBE CULT: NORMAL
BACTERIA SPEC RESP CULT: NORMAL
BUN SERPL-MCNC: 35 MG/DL (ref 8–23)
BUN/CREAT SERPL: 5.5 (ref 7–25)
CALCIUM SPEC-SCNC: 7.7 MG/DL (ref 8.6–10.5)
CHLORIDE SERPL-SCNC: 102 MMOL/L (ref 98–107)
CO2 SERPL-SCNC: 24.2 MMOL/L (ref 22–29)
CREAT SERPL-MCNC: 6.36 MG/DL (ref 0.76–1.27)
DEPRECATED RDW RBC AUTO: 60 FL (ref 37–54)
EGFRCR SERPLBLD CKD-EPI 2021: 8.4 ML/MIN/1.73
ERYTHROCYTE [DISTWIDTH] IN BLOOD BY AUTOMATED COUNT: 17.2 % (ref 12.3–15.4)
GLUCOSE BLDC GLUCOMTR-MCNC: 142 MG/DL (ref 70–99)
GLUCOSE BLDC GLUCOMTR-MCNC: 169 MG/DL (ref 70–99)
GLUCOSE BLDC GLUCOMTR-MCNC: 93 MG/DL (ref 70–99)
GLUCOSE SERPL-MCNC: 151 MG/DL (ref 65–99)
GRAM STN SPEC: NORMAL
HCT VFR BLD AUTO: 28.4 % (ref 37.5–51)
HGB BLD-MCNC: 8.8 G/DL (ref 13–17.7)
MCH RBC QN AUTO: 30 PG (ref 26.6–33)
MCHC RBC AUTO-ENTMCNC: 31 G/DL (ref 31.5–35.7)
MCV RBC AUTO: 96.9 FL (ref 79–97)
PLATELET # BLD AUTO: 107 10*3/MM3 (ref 140–450)
PMV BLD AUTO: 11 FL (ref 6–12)
POTASSIUM SERPL-SCNC: 3.3 MMOL/L (ref 3.5–5.2)
RBC # BLD AUTO: 2.93 10*6/MM3 (ref 4.14–5.8)
SODIUM SERPL-SCNC: 139 MMOL/L (ref 136–145)
WBC NRBC COR # BLD AUTO: 6.62 10*3/MM3 (ref 3.4–10.8)

## 2024-03-03 PROCEDURE — 94799 UNLISTED PULMONARY SVC/PX: CPT

## 2024-03-03 PROCEDURE — 25010000002 AMPICILLIN-SULBACTAM PER 1.5 G: Performed by: INTERNAL MEDICINE

## 2024-03-03 PROCEDURE — 82948 REAGENT STRIP/BLOOD GLUCOSE: CPT

## 2024-03-03 PROCEDURE — 25010000002 HEPARIN (PORCINE) PER 1000 UNITS: Performed by: INTERNAL MEDICINE

## 2024-03-03 PROCEDURE — 82948 REAGENT STRIP/BLOOD GLUCOSE: CPT | Performed by: STUDENT IN AN ORGANIZED HEALTH CARE EDUCATION/TRAINING PROGRAM

## 2024-03-03 PROCEDURE — 85027 COMPLETE CBC AUTOMATED: CPT | Performed by: STUDENT IN AN ORGANIZED HEALTH CARE EDUCATION/TRAINING PROGRAM

## 2024-03-03 PROCEDURE — 25010000002 LORAZEPAM PER 2 MG: Performed by: PSYCHIATRY & NEUROLOGY

## 2024-03-03 PROCEDURE — 80048 BASIC METABOLIC PNL TOTAL CA: CPT | Performed by: FAMILY MEDICINE

## 2024-03-03 PROCEDURE — 99233 SBSQ HOSP IP/OBS HIGH 50: CPT | Performed by: INTERNAL MEDICINE

## 2024-03-03 RX ORDER — HYDROCODONE POLISTIREX AND CHLORPHENIRAMINE POLISTIREX 10; 8 MG/5ML; MG/5ML
2.5 SUSPENSION, EXTENDED RELEASE ORAL EVERY 12 HOURS PRN
Status: DISCONTINUED | OUTPATIENT
Start: 2024-03-03 | End: 2024-03-09 | Stop reason: HOSPADM

## 2024-03-03 RX ORDER — QUETIAPINE FUMARATE 25 MG/1
12.5 TABLET, FILM COATED ORAL EVERY 8 HOURS PRN
Status: DISCONTINUED | OUTPATIENT
Start: 2024-03-03 | End: 2024-03-05

## 2024-03-03 RX ORDER — LORAZEPAM 2 MG/ML
0.5 INJECTION INTRAMUSCULAR ONCE
Status: COMPLETED | OUTPATIENT
Start: 2024-03-03 | End: 2024-03-03

## 2024-03-03 RX ORDER — POTASSIUM CHLORIDE 20 MEQ/1
20 TABLET, EXTENDED RELEASE ORAL
Status: DISPENSED | OUTPATIENT
Start: 2024-03-03 | End: 2024-03-04

## 2024-03-03 RX ORDER — HYDROXYZINE HYDROCHLORIDE 10 MG/1
10 TABLET, FILM COATED ORAL ONCE AS NEEDED
Status: DISCONTINUED | OUTPATIENT
Start: 2024-03-03 | End: 2024-03-03

## 2024-03-03 RX ADMIN — Medication 10 ML: at 21:17

## 2024-03-03 RX ADMIN — SODIUM CHLORIDE, SODIUM LACTATE, CALCIUM CHLORIDE, MAGNESIUM CHLORIDE AND DEXTROSE 6000 ML: 1.5; 538; 448; 18.3; 5.08 INJECTION, SOLUTION INTRAPERITONEAL at 20:00

## 2024-03-03 RX ADMIN — LORAZEPAM 0.5 MG: 2 INJECTION INTRAMUSCULAR; INTRAVENOUS at 22:30

## 2024-03-03 RX ADMIN — NYSTATIN 500000 UNITS: 100000 SUSPENSION ORAL at 17:39

## 2024-03-03 RX ADMIN — HEPARIN SODIUM 5000 UNITS: 5000 INJECTION INTRAVENOUS; SUBCUTANEOUS at 14:55

## 2024-03-03 RX ADMIN — NYSTATIN 500000 UNITS: 100000 SUSPENSION ORAL at 11:50

## 2024-03-03 RX ADMIN — Medication 10 ML: at 17:03

## 2024-03-03 RX ADMIN — BUDESONIDE 0.5 MG: 0.5 INHALANT RESPIRATORY (INHALATION) at 20:36

## 2024-03-03 RX ADMIN — SEVELAMER CARBONATE 1600 MG: 800 TABLET, FILM COATED ORAL at 17:39

## 2024-03-03 RX ADMIN — ARFORMOTEROL TARTRATE 15 MCG: 15 SOLUTION RESPIRATORY (INHALATION) at 20:37

## 2024-03-03 RX ADMIN — AMPICILLIN AND SULBACTAM 3 G: 2; 1 INJECTION, POWDER, FOR SOLUTION INTRAVENOUS at 11:48

## 2024-03-03 RX ADMIN — GENTAMICIN SULFATE 1 APPLICATION: 1 CREAM TOPICAL at 17:03

## 2024-03-03 RX ADMIN — POTASSIUM CHLORIDE 20 MEQ: 1500 TABLET, EXTENDED RELEASE ORAL at 17:40

## 2024-03-03 RX ADMIN — POTASSIUM CHLORIDE 20 MEQ: 1500 TABLET, EXTENDED RELEASE ORAL at 19:19

## 2024-03-03 RX ADMIN — HEPARIN SODIUM 5000 UNITS: 5000 INJECTION INTRAVENOUS; SUBCUTANEOUS at 21:17

## 2024-03-03 NOTE — PROGRESS NOTES
Pulmonary / Critical Care Progress Note      Patient Name: Nelson Wang  : 1945  MRN: [id number]  Attending:  Devante Friedman MD  Date of admission: 2024    Subjective   Subjective   Follow-up for pneumonia    No acute events overnight    This morning,  Remains on 2 L nasal cannula  Sitting on the edge of the bed with daughter  He is confused and had some issues with delirium overnight  Denies any chest pain or chest tightness  Denies cough or hemoptysis  No fever or chills      Objective   Objective     Vitals:   Temp:  [97.2 °F (36.2 °C)-98.9 °F (37.2 °C)] 97.2 °F (36.2 °C)  Heart Rate:  [41-84] 41  Resp:  [17-20] 19  BP: (101-125)/(51-60) 125/53  Flow (L/min):  [2] 2    Physical Exam   Vital Signs Reviewed   General:  WDWN, Alert, NAD. elderly male, lying in bed  HEENT:  PERRL, EOMI.  OP, nares clear  Chest: Diminished to auscultation bilaterally, n no work of breathing noted on 2 L nasal cannula  CV: RRR, no MGR, pulses 2+, equal.  Abd:  Soft, NT, ND, + BS  EXT:  no clubbing, no cyanosis, no edema  Neuro:  A&Ox2, more confused this morning, CN grossly intact, no focal deficits.  Skin: No rashes or lesions noted, multiple wounds noted please see charting    Result Review    Result Review:  I have personally reviewed the results from the time of this admission to 3/3/2024 06:02 EST and agree with these findings:  [x]  Laboratory  [x]  Microbiology  [x]  Radiology  []  EKG/Telemetry   []  Cardiology/Vascular   []  Pathology  []  Old records  []  Other:  Most notable findings include:         Lab 24  0527 24  0403 24  2254 24  2150   WBC 7.45 7.46  --  10.09   HEMOGLOBIN 8.8* 9.2*  --  10.9*   HEMATOCRIT 29.4* 29.9*  --  34.8*   PLATELETS 128* 155  --  230   SODIUM 138 136 139  --    POTASSIUM 3.5 3.9 4.1  --    CHLORIDE 102 100 99  --    CO2 26.0 25.1 28.7  --    BUN 35* 33* 22  --    CREATININE 7.02* 6.40* 6.04*  --    GLUCOSE 142* 170* 164*  --    CALCIUM 7.9* 8.0* 8.3*  --     PHOSPHORUS 5.3*  --   --   --    TOTAL PROTEIN  --   --  5.9*  --    ALBUMIN  --   --  2.6*  --    GLOBULIN  --   --  3.3  --      Bronchoscopy BAL negative.  Bronchoscopy note personally reviewed  Assessment & Plan   Assessment / Plan     Active Hospital Problems:  Active Hospital Problems    Diagnosis     **Acute on chronic respiratory failure with hypoxia     HCAP (healthcare-associated pneumonia)     ESRD (end stage renal disease)     Aspiration pneumonitis     Peritoneal dialysis catheter in place     Essential hypertension     Anemia due to chronic kidney disease     Type 2 diabetes mellitus without complication      Impression:  Acute hypoxic respiratory failure requiring supplemental oxygen  Concern for aspiration pneumonia  NSTEMI type I versus type II  ESRD on peritoneal dialysis  Anemia of chronic kidney disease  T2DM with hyperglycemia  Hypomagnesemia     Plan:  -On 2 L nasal cannula.  Continue insulin oxygen to maintain SpO2 greater than 90%.  Does not wear any home O2 at baseline  -Patient is s/p bronchoscopy on 3/1.  Micro negative to date  -Continue Unasyn for total 7 days in the setting of aspiration pneumonia  -Continue Brovana, Pulmicort, DuoNebs  -Continue chest physiotherapy  -Continue bronchopulmonary hygiene.  Encourage I-S and flutter  -Continue to monitor renal panel and electrolytes.  Replaced magnesium intravenously  -Continue aspiration precautions  -SLP following, appreciate input  -Peritoneal dialysis per nephrology, appreciate input  -Continue delirium precautions and Seroquel for now.  Psychiatry to evaluate  -Follow-up pulmonary clinic 1 to 2 weeks after discharge       DVT prophylaxis:  Medical DVT prophylaxis orders are present.    CODE STATUS:   Level Of Support Discussed With: Patient  Code Status (Patient has no pulse and is not breathing): CPR (Attempt to Resuscitate)  Medical Interventions (Patient has pulse or is breathing): Full Support    Labs, images, and medications  personally reviewed.  Discussed with patient    I, Dr. Scottie Valentin, have spent more than 50% of the total time managing the patient in this encounter today.  This included personally reviewing all pertinent labs, imaging, microbiology and documentation. Also discussing the case with the patient and any available family, the admitting physician and any available ancillary staff.    Electronically signed by BECKY Rosenthal, 03/03/24, 9:10 AM EST.  Electronically signed by Scottie Valentin MD, 03/03/24, 10:16 AM EST.

## 2024-03-03 NOTE — PROGRESS NOTES
Psychiatric     Nephrology Progress Note      Patient Name: Nelson Wang  : 1945  MRN: [id number]  Primary Care Physician:  Janna Mo MD  Date of admission: 2024    Subjective   Subjective     Interval History:  Patient sitting on side of bed, restless.  Denies any chest pain shortness of breath nausea or vomiting currently    Objective   Objective     Vitals:   Temp:  [97.2 °F (36.2 °C)-98.6 °F (37 °C)] 97.9 °F (36.6 °C)  Heart Rate:  [41-93] 93  Resp:  [17-20] 20  BP: (121-152)/(52-86) 152/86  Flow (L/min):  [2] 2  Physical Exam:   Constitutional: Awake, alert, no distress.  Restless   Respiratory: Decreased breath sounds bilaterally   Cardiovascular: RRR, no murmurs, rubs, or gallops.   Gastrointestinal: Positive bowel sounds, soft, nontender, nondistended, PD catheter in place, site is covered.   Extremities : Trace lower extremity edema, no cyanosis   Psychiatric: Appropriate affect, cooperative   Neurologic: Alert and oriented grossly intact    Skin: warm and dry, no rashes, vitiligo.    Result Review    Result Reviewed:  I have personally reviewed the results from the time of this admission to 3/3/2024 15:57 EST and agree with these findings:  [x]  Laboratory  []  Microbiology  [x]  Radiology  []  EKG/Telemetry   []  Cardiology/Vascular   []  Pathology  []  Old records  []  Other:        Lab 24  1158 24  0527 24  0403 24  2254   SODIUM 139 138 136 139   POTASSIUM 3.3* 3.5 3.9 4.1   CHLORIDE 102 102 100 99   CO2 24.2 26.0 25.1 28.7   BUN 35* 35* 33* 22   CREATININE 6.36* 7.02* 6.40* 6.04*   GLUCOSE 151* 142* 170* 164*   EGFR 8.4* 7.4* 8.3* 8.9*   ANION GAP 12.8 10.0 10.9 11.3   MAGNESIUM  --  1.6  --   --    PHOSPHORUS  --  5.3*  --   --            Most notable findings include: TSH 8.7, hemoglobin 9.2.  Potassium 3.9.    Assessment & Plan   Assessment / Plan       Active Hospital Problems:  Active Hospital Problems    Diagnosis     **Acute on chronic  respiratory failure with hypoxia     HCAP (healthcare-associated pneumonia)     ESRD (end stage renal disease)     Aspiration pneumonitis     Peritoneal dialysis catheter in place     Essential hypertension     Anemia due to chronic kidney disease     Type 2 diabetes mellitus without complication        Assessment and Plan:    - End-stage renal disease on peritoneal dialysis, currently using 1.5% dextrose solution on cycler per home prescription.  Daughter, Dr. Wang, is providing his PD at night.  Discussed with her and she is going to add a 2.5% glucose bag due to some decreased breath sounds and some mild edema.  Continue gentamicin 0.1% cream daily application to PD catheter exit site.  Continue with nystatin swish and swallow for fungal peritonitis prevention while on antibiotics.    - Pneumonia, status post bronchoscopy, per pulmonary.  Antibiotics and steroids currently.    - Hypertension, blood pressure is acceptable.    - Anemia of chronic kidney disease, hemoglobin below target but no change today 8.8.  On long-acting RONALD as outpatient.  Monitor for now.      - Type 2 diabetes with complications.    -- Hypokalemia: Does not appear been replaced yet today will replace with oral replacement  .      Electronically signed by Galileo Cook MD, 3/3/2024, 15:57 EST.

## 2024-03-03 NOTE — PROGRESS NOTES
[facility]   Hospitalist Progress Note  Date: 3/3/2024  Patient Name: Nelson Wang  : 1945  MRN: [id number]  Date of admission: 2024      Subjective   Subjective     Chief Complaint: Follow-up shortness of breath    Summary:Nelson Wang is a 79 y.o. male  with past medical history of diabetes, end-stage renal disease on peritoneal dialysis, immunosuppression on Skyrizi, hypertension, hyperlipidemia, hearing impairment, CAD, vertigo, GERD presents to the ED with shortness of breath.  Patient was discharged from this hospital 5 days ago to inpatient treatment of E. coli bacteremia with septic shock, C. difficile colitis, peritonitis and pneumonia included ICU management.  Since his discharge patient has been mostly bedbound and lethargic due to generalized weakness and shortness of breath.  Patient's daughter (Dr. Wang) was concerned so she brought him to the ED for further evaluation.  In the ED patient was hypoxic on arrival requiring oxygen supplementation via nasal cannula.  Labs showed that he had a mildly elevated lactic acid with significantly elevated proBNP and troponin levels.  ABG was relatively unremarkable on nasal cannula.  Repeat troponin had a significant delta of +232.  No ischemic changes on EKG.  Patient denied any chest pain.  CT of the chest showed worsening progression of multifocal pneumonia much greater on the right and new small pleural effusions.  Patient denies any fevers, chills, chest pain, nausea, or vomiting.  Patient did have diarrhea that has been improving over the last few days.  Patient was admitted for further evaluation and treatment.  Started on empiric antibiotics.  Pulmonology, nephrology and cardiology consulted.  Patient started on heparin drip.  Pulmonology took patient for bronchoscopy which showed extremely inflamed and friable mucosa and easy bleeding with edema of the airways and mucous plugging with purulent secretions.  BAL negative by PCR.   Cultures pending.  Cardiology planning on stress test as an outpatient after recovered from current illness    Interval Followup: Patient sitting up in bed appears to be resting fairly comfortably with family at the bedside.  Patient remains confused and anxious overnight and again this morning.  Patient states he feels like his pretty good.  Patient oriented to person place not time.  Afebrile overnight.  Sinus rhythm 40s to 80s on telemetry review patient has been refusing telemetry since about 3 AM.  Blood pressure within normal limits.  Satting well on 2 L nasal cannula.  Blood sugars fairly well-controlled.  Serum potassium trending down.  Hemoglobin low but stable.  Platelets trending down.  No other issues per nursing.    Review of Systems  Constitutional: Negative for fatigue and fever.   HENT: Negative for sore throat and trouble swallowing.    Eyes: Negative for pain and discharge.   Respiratory: Positive for cough and shortness of breath.    Cardiovascular: Negative for chest pain and palpitations.   Gastrointestinal: Negative for abdominal pain, nausea and vomiting.   Endocrine: Negative for cold intolerance and heat intolerance.   Genitourinary: Negative for difficulty urinating and dysuria.   Musculoskeletal: Negative for back pain and neck stiffness.   Skin: Negative for color change and rash.   Neurological: Negative for syncope and headaches.   Hematological: Negative for adenopathy.   Psychiatric/Behavioral: Positive for confusion and negative hallucinations.    Objective   Objective     Vitals:   Temp:  [97.2 °F (36.2 °C)-98.7 °F (37.1 °C)] 98.1 °F (36.7 °C)  Heart Rate:  [41-84] 52  Resp:  [17-19] 17  BP: (101-127)/(52-60) 121/52  Flow (L/min):  [2] 2  Physical Exam   General: well-developed appearing stated age in no acute distress  HEENT: Normocephalic atraumatic moist membranes pupils equal round reactive light, no scleral icterus no conjunctival injection  Cardiovascular: regular rate and  rhythm no murmurs rubs or gallops S1-S2, no lower extremity edema appreciated  Pulmonary: Crackles bilateral bases scant rhonchi no wheezes symmetric chest expansion, unlabored, no conversational dyspnea appreciated  Gastrointestinal: Soft nontender nondistended positive bowel sounds all 4 quadrants no rebound or guarding  Musculoskeletal: No clubbing cyanosis, warm and well-perfused, calves soft symmetric nontender bilaterally  Skin: Clean dry  Neuro: Cranial nerves II through XII intact grossly no sensorimotor deficits appreciated bilateral upper and lower extremities  Psych: Patient is calm cooperative and appropriate with exam not responding to internal stimuli  : No Kolb catheter no bladder distention no suprapubic tenderness    Result Review    Result Review:  I have personally reviewed these results and agree with these findings:  [x]  Laboratory  LAB RESULTS:      Lab 03/03/24  1158 03/02/24  0527 03/01/24  1050 03/01/24  0403 03/01/24  0030 02/29/24  1644 02/29/24  1046 02/29/24  0431 02/29/24  0150 02/28/24  2150   WBC 6.62 7.45  --  7.46  --   --   --   --   --  10.09   HEMOGLOBIN 8.8* 8.8*  --  9.2*  --   --   --   --   --  10.9*   HEMATOCRIT 28.4* 29.4*  --  29.9*  --   --   --   --   --  34.8*   PLATELETS 107* 128*  --  155  --   --   --   --   --  230   NEUTROS ABS  --   --   --   --   --   --   --   --   --  7.14*   IMMATURE GRANS (ABS)  --   --   --   --   --   --   --   --   --  0.13*   LYMPHS ABS  --   --   --   --   --   --   --   --   --  1.53   MONOS ABS  --   --   --   --   --   --   --   --   --  0.79   EOS ABS  --   --   --   --   --   --   --   --   --  0.45*   MCV 96.9 98.0*  --  98.4*  --   --   --   --   --  96.4   CRP  --   --   --   --   --   --   --   --  5.42*  --    PROCALCITONIN  --   --   --   --   --   --   --   --  0.73*  --    LACTATE  --   --   --   --   --   --   --  1.8  --  2.6*   PROTIME  --   --   --   --   --   --   --  14.7  --   --    APTT  --   --  61.7*  --  67.6*  57.4* >200.0* 30.0*  --   --          Lab 03/03/24  1158 03/02/24  0527 03/01/24  0403 02/29/24  0150 02/28/24  2254   SODIUM 139 138 136  --  139   POTASSIUM 3.3* 3.5 3.9  --  4.1   CHLORIDE 102 102 100  --  99   CO2 24.2 26.0 25.1  --  28.7   ANION GAP 12.8 10.0 10.9  --  11.3   BUN 35* 35* 33*  --  22   CREATININE 6.36* 7.02* 6.40*  --  6.04*   EGFR 8.4* 7.4* 8.3*  --  8.9*   GLUCOSE 151* 142* 170*  --  164*   CALCIUM 7.7* 7.9* 8.0*  --  8.3*   MAGNESIUM  --  1.6  --   --   --    PHOSPHORUS  --  5.3*  --   --   --    TSH  --   --   --  8.760*  --          Lab 02/28/24  2254   TOTAL PROTEIN 5.9*   ALBUMIN 2.6*   GLOBULIN 3.3   ALT (SGPT) 13   AST (SGOT) 34   BILIRUBIN 0.3   ALK PHOS 65         Lab 02/29/24  0431 02/29/24  0150 02/28/24  2254 02/28/24  2150   PROBNP  --   --   --  >70,000.0*   HSTROP T  --  428* 196*  --    PROTIME 14.7  --   --   --    INR 1.13  --   --   --                  Lab 02/29/24  0013   PH, ARTERIAL 7.394   PCO2, ARTERIAL 44.6   PO2 ART 84.0   O2 SATURATION ART 94.8*   FIO2 32   HCO3 ART 26.6*   BASE EXCESS ART 1.4   CARBOXYHEMOGLOBIN 0.1     Brief Urine Lab Results  (Last result in the past 365 days)        Color   Clarity   Blood   Leuk Est   Nitrite   Protein   CREAT   Urine HCG        02/16/24 1127 Dark Yellow   Clear   Negative   Negative   Negative   100 mg/dL (2+)                 Microbiology Results (last 10 days)       Procedure Component Value - Date/Time    Respiratory Culture - Wash, Bronchus [[id number]] Collected: 03/01/24 0932    Lab Status: Final result Specimen: Wash from Bronchus Updated: 03/03/24 1133     Respiratory Culture Moderate growth (3+) Normal respiratory lashae. No S. aureus or Pseudomonas aeruginosa detected. Final report.     Gram Stain Few (2+) Gram positive cocci in pairs and chains      Few (2+) WBCs seen    AFB Culture - Lavage, Lung, Right Lower Lobe [[id number]] Collected: 03/01/24 0922    Lab Status: Preliminary result Specimen: Lavage from Lung, Right  Lower Lobe Updated: 03/01/24 1230     AFB Stain No acid fast bacilli seen on direct smear    BAL Culture, Quantitative - Lavage, Lung, Right Lower Lobe [[id number]] Collected: 03/01/24 0922    Lab Status: Final result Specimen: Lavage from Lung, Right Lower Lobe Updated: 03/03/24 1132     BAL Culture 50,000 CFU/mL Normal respiratory lashae. No S. aureus or Pseudomonas aeruginosa detected. Final report.     Gram Stain Few (2+) Gram positive cocci in pairs and chains      Rare (1+) WBCs seen    Pneumonia Panel - Lavage, Lung, Right Lower Lobe [[id number]]  (Normal) Collected: 03/01/24 0922    Lab Status: Final result Specimen: Lavage from Lung, Right Lower Lobe Updated: 03/01/24 1145     Escherichia coli PCR Not Detected     Acinetobacter calcoaceticus-baumannii complex PCR Not Detected     Enterobacter cloacae PCR Not Detected     Klebsiella oxytoca PCR Not Detected     Klebsiella pneumoniae group PCR Not Detected     Klebsiella aerogenes PCR Not Detected     Moraxella catarrhalis PCR Not Detected     Proteus species PCR Not Detected     Pseudomonas aeroginosa PCR Not Detected     Serratia marcescens PCR Not Detected     Staphylococcus aureus PCR Not Detected     Streptococcus pyogenes PCR Not Detected     Haemophilus influenzae PCR Not Detected     Streptococcus agalactiae PCR Not Detected     Streptococcus pneumoniae PCR Not Detected     Chlamydophila pneumoniae PCR Not Detected     Legionella pneumophilia PCR Not Detected     Mycoplasma pneumo by PCR Not Detected     ADENOVIRUS, PCR Not Detected     CTX-M Gene N/A     IMP Gene N/A     KPC Gene N/A     mecA/C and MREJ Gene N/A     NDM Gene N/A     OXA-48-like Gene N/A     VIM Gene N/A     Coronavirus Not Detected     Human Metapneumovirus Not Detected     Human Rhinovirus/Enterovirus Not Detected     Influenza A PCR Not Detected     Influenza B PCR Not Detected     RSV, PCR Not Detected     Parainfluenza virus PCR Not Detected    Respiratory Panel PCR  w/COVID-19(SARS-CoV-2) RA/TANIA/GAVIN/[facility] In-House, NP Swab in UTM/VTM, 2 HR TAT - Swab, Nasopharynx [[id number]]  (Normal) Collected: 02/29/24 0959    Lab Status: Final result Specimen: Swab from Nasopharynx Updated: 02/29/24 1131     ADENOVIRUS, PCR Not Detected     Coronavirus 229E Not Detected     Coronavirus HKU1 Not Detected     Coronavirus NL63 Not Detected     Coronavirus OC43 Not Detected     COVID19 Not Detected     Human Metapneumovirus Not Detected     Human Rhinovirus/Enterovirus Not Detected     Influenza A PCR Not Detected     Influenza B PCR Not Detected     Parainfluenza Virus 1 Not Detected     Parainfluenza Virus 2 Not Detected     Parainfluenza Virus 3 Not Detected     Parainfluenza Virus 4 Not Detected     RSV, PCR Not Detected     Bordetella pertussis pcr Not Detected     Bordetella parapertussis PCR Not Detected     Chlamydophila pneumoniae PCR Not Detected     Mycoplasma pneumo by PCR Not Detected    Narrative:      In the setting of a positive respiratory panel with a viral infection PLUS a negative procalcitonin without other underlying concern for bacterial infection, consider observing off antibiotics or discontinuation of antibiotics and continue supportive care. If the respiratory panel is positive for atypical bacterial infection (Bordetella pertussis, Chlamydophila pneumoniae, or Mycoplasma pneumoniae), consider antibiotic de-escalation to target atypical bacterial infection.    MRSA Screen, PCR (Inpatient) - Swab, Nares [[id number]]  (Normal) Collected: 02/29/24 0557    Lab Status: Final result Specimen: Swab from Nares Updated: 02/29/24 0826     MRSA PCR No MRSA Detected    Narrative:      The negative predictive value of this diagnostic test is high and should only be used to consider de-escalating anti-MRSA therapy. A positive result may indicate colonization with MRSA and must be correlated clinically.    COVID-19, FLU A/B, RSV PCR 1 HR TAT - Swab, Nasopharynx [[id number]]   (Normal) Collected: 02/28/24 2201    Lab Status: Final result Specimen: Swab from Nasopharynx Updated: 02/28/24 2244     COVID19 Not Detected     Influenza A PCR Not Detected     Influenza B PCR Not Detected     RSV, PCR Not Detected    Narrative:      Fact sheet for providers: https://www.fda.gov/media/947293/download    Fact sheet for patients: https://www.fda.gov/media/006674/download    Test performed by PCR.    Blood Culture - Blood, Hand, Left [[id number]]  (Normal) Collected: 02/28/24 2150    Lab Status: Preliminary result Specimen: Blood from Hand, Left Updated: 03/02/24 2200     Blood Culture No growth at 3 days    Blood Culture - Blood, Hand, Left [[id number]]  (Normal) Collected: 02/28/24 2150    Lab Status: Preliminary result Specimen: Blood from Hand, Left Updated: 03/02/24 2200     Blood Culture No growth at 3 days            [x]  Microbiology  [x]  Radiology  CT Chest Without Contrast Diagnostic    Result Date: 2/29/2024   Worsening progression is suggested by CT increased bilateral pulmonary consolidation, much greater on the right than the left.  The findings suggest infectious multifocal pneumonia.  Aspiration pneumonia be excluded (especially in the right lower lobe).  New or increased pleural effusions are seen (small in volume).  Gallstones are present without definite acute cholecystitis.  Please see above comments for further detail.     Please note that portions of this note were completed with a voice recognition program.  АНДРЕЙ DE LUNA JR, MD       Electronically Signed and Approved By: АНДРЕЙ DE LUNA JR, MD on 2/29/2024 at 0:10              XR Chest 1 View    Result Date: 2/28/2024   Improving right basilar density compatible with persistent but resolving pneumonia.  The right IJ central venous catheter has been removed.  No new findings.       RENITA BARRAZA DO       Electronically Signed and Approved By: RENITA BARRAZA DO on 2/28/2024 at 22:19              [x]  EKG/Telemetry   []   Cardiology/Vascular   []  Pathology  []  Old records  [x]  Other:  Scheduled Meds:!Patient Home Medications Stored on Unit, , Does not apply, BID  ampicillin-sulbactam, 3 g, Intravenous, Q24H  arformoterol, 15 mcg, Nebulization, BID - RT  aspirin, 81 mg, Oral, Daily  atorvastatin, 40 mg, Oral, Daily  budesonide, 0.5 mg, Nebulization, BID - RT  gentamicin, 1 Application, Topical, Daily  guaiFENesin, 600 mg, Oral, Q12H  heparin (porcine), 5,000 Units, Subcutaneous, Q8H  dianeal lo-peter 1.5%, 6,000 mL, Intraperitoneal, Daily  insulin regular, 2-9 Units, Subcutaneous, Q6H  levothyroxine, 125 mcg, Oral, Q AM  melatonin, 5 mg, Oral, Nightly  nystatin, 5 mL, Swish & Swallow, 4x Daily  QUEtiapine, 12.5 mg, Oral, Nightly  saccharomyces boulardii, 250 mg, Oral, BID  sevelamer, 1,600 mg, Oral, BID With Meals  sodium chloride, 10 mL, Intravenous, Q12H      Continuous Infusions:   PRN Meds:.  acetaminophen    benzonatate    senna-docusate sodium **AND** polyethylene glycol **AND** bisacodyl **AND** bisacodyl    dextrose    dextrose    glucagon (human recombinant)    ipratropium-albuterol    ondansetron    QUEtiapine    sodium chloride    sodium chloride    sodium chloride      Assessment & Plan   Assessment / Plan     Assessment/Plan:  Acute on chronic hypoxic respiratory failure  Concern for worsening multifocal pneumonia versus aspiration pneumonia  Mucous plugging of the airway  Elevated troponin unsure if type I or type II NSTEMI  End-stage renal disease on peritoneal dialysis  Diabetes mellitus  Hypertension  Hypothyroidism  Recent E. coli bacteremia  Recent C. difficile colitis  Delirium      Patient admitted for further evaluation and treatment  Pulmonology critical care, nephrology, cardiology and psychiatry consulted thank you for assistance  Continue supplemental oxygen as needed to keep sats greater than 90%  Continue ampicillin-sulbactam per pulmonology  Follow-up BAL cultures.  So far normal for  Continue Bessie,  Pulmicort  Continue bronchopulmonary hygiene protocol  Continue chest physiotherapy  Continue aspirin atorvastatin  Follow-up with cardiology for outpatient stress testing once recovered from current illness  Continue PD with home cycler at night per nephrology  Continue sevelamer  Continue sliding scale insulin  Continue home Synthroid  Continue probiotics while taking antibiotics  Start Seroquel at night scheduled with as needed Seroquel through the day every 8 hours  Further inpatient orders recommendations pending clinical course         Discussed plan with bedside RN as well as Dr. Vásquez psychiatry.    Disposition: Home once cleared by pulmonology and mental status improved to follow-up with pulmonology and cardiology as an outpatient.  Patient will need outpatient stress testing per cardiology.    DVT prophylaxis:  Medical DVT prophylaxis orders are present.        CODE STATUS:   Level Of Support Discussed With: Patient  Code Status (Patient has no pulse and is not breathing): CPR (Attempt to Resuscitate)  Medical Interventions (Patient has pulse or is breathing): Full Support

## 2024-03-03 NOTE — PLAN OF CARE
Goal Outcome Evaluation:  Plan of Care Reviewed With: patient, spouse, caregiver  More confused today.  Slept intermittently. Care plan is ongoing.

## 2024-03-03 NOTE — NURSING NOTE
Patient becoming increasingly confused  and agitated during shift. Patient very restless, frequently getting up and unable to reorient patient. Daughter at bedside concerned about garbled speech. Stroke RRT called. Hospitalist at bedside to assess patient. Instructed to minimize stimulation. Ok to defer vitals for now as they agitate the patient further. Family currently at bedside.

## 2024-03-04 ENCOUNTER — APPOINTMENT (OUTPATIENT)
Dept: GENERAL RADIOLOGY | Facility: HOSPITAL | Age: 79
DRG: 193 | End: 2024-03-04
Payer: MEDICARE

## 2024-03-04 LAB
ANION GAP SERPL CALCULATED.3IONS-SCNC: 12.7 MMOL/L (ref 5–15)
ARTERIAL PATENCY WRIST A: POSITIVE
BACTERIA SPEC AEROBE CULT: NORMAL
BACTERIA SPEC AEROBE CULT: NORMAL
BASE EXCESS BLDA CALC-SCNC: -1 MMOL/L (ref -2–2)
BDY SITE: ABNORMAL
BUN SERPL-MCNC: 34 MG/DL (ref 8–23)
BUN/CREAT SERPL: 5.7 (ref 7–25)
CA-I BLDA-SCNC: 1.09 MMOL/L (ref 1.13–1.32)
CALCIUM SPEC-SCNC: 8.2 MG/DL (ref 8.6–10.5)
CHLORIDE BLDA-SCNC: 104 MMOL/L (ref 98–106)
CHLORIDE SERPL-SCNC: 102 MMOL/L (ref 98–107)
CO2 SERPL-SCNC: 25.3 MMOL/L (ref 22–29)
COHGB MFR BLD: 0.3 % (ref 0–1.5)
CREAT SERPL-MCNC: 5.93 MG/DL (ref 0.76–1.27)
D-LACTATE SERPL-SCNC: 1.3 MMOL/L (ref 0.5–2)
DEPRECATED RDW RBC AUTO: 59.7 FL (ref 37–54)
EGFRCR SERPLBLD CKD-EPI 2021: 9.1 ML/MIN/1.73
ERYTHROCYTE [DISTWIDTH] IN BLOOD BY AUTOMATED COUNT: 17.2 % (ref 12.3–15.4)
FHHB: 1.3 % (ref 0–5)
GAS FLOW AIRWAY: 15 LPM
GLUCOSE BLDA-MCNC: 140 MG/DL (ref 70–99)
GLUCOSE BLDC GLUCOMTR-MCNC: 100 MG/DL (ref 70–99)
GLUCOSE BLDC GLUCOMTR-MCNC: 111 MG/DL (ref 70–99)
GLUCOSE BLDC GLUCOMTR-MCNC: 115 MG/DL (ref 70–99)
GLUCOSE BLDC GLUCOMTR-MCNC: 139 MG/DL (ref 70–99)
GLUCOSE BLDC GLUCOMTR-MCNC: 152 MG/DL (ref 70–99)
GLUCOSE SERPL-MCNC: 209 MG/DL (ref 65–99)
HCO3 BLDA-SCNC: 25.4 MMOL/L (ref 22–26)
HCT VFR BLD AUTO: 31.3 % (ref 37.5–51)
HGB BLD-MCNC: 9.4 G/DL (ref 13–17.7)
HGB BLDA-MCNC: 11 G/DL (ref 13.8–16.4)
INHALED O2 CONCENTRATION: ABNORMAL %
LACTATE BLDA-SCNC: 1.18 MMOL/L (ref 0.5–2)
MAGNESIUM SERPL-MCNC: 1.7 MG/DL (ref 1.6–2.4)
MCH RBC QN AUTO: 29.2 PG (ref 26.6–33)
MCHC RBC AUTO-ENTMCNC: 30 G/DL (ref 31.5–35.7)
MCV RBC AUTO: 97.2 FL (ref 79–97)
METHGB BLD QL: 0.3 % (ref 0–1.5)
MODALITY: ABNORMAL
NOTE: ABNORMAL
OXYHGB MFR BLDV: 98.1 % (ref 94–99)
PCO2 BLDA: 50.2 MM HG (ref 35–45)
PH BLDA: 7.32 PH UNITS (ref 7.35–7.45)
PHOSPHATE SERPL-MCNC: 4.9 MG/DL (ref 2.5–4.5)
PLATELET # BLD AUTO: 117 10*3/MM3 (ref 140–450)
PMV BLD AUTO: 11.6 FL (ref 6–12)
PO2 BLD: ABNORMAL MM[HG]
PO2 BLDA: 259.6 MM HG (ref 80–100)
POTASSIUM BLDA-SCNC: 3.92 MMOL/L (ref 3.5–5)
POTASSIUM SERPL-SCNC: 3.6 MMOL/L (ref 3.5–5.2)
RBC # BLD AUTO: 3.22 10*6/MM3 (ref 4.14–5.8)
SAO2 % BLDCOA: 98.7 % (ref 95–99)
SODIUM BLDA-SCNC: 137.6 MMOL/L (ref 136–146)
SODIUM SERPL-SCNC: 140 MMOL/L (ref 136–145)
WBC NRBC COR # BLD AUTO: 6.13 10*3/MM3 (ref 3.4–10.8)

## 2024-03-04 PROCEDURE — 36600 WITHDRAWAL OF ARTERIAL BLOOD: CPT

## 2024-03-04 PROCEDURE — 82805 BLOOD GASES W/O2 SATURATION: CPT

## 2024-03-04 PROCEDURE — 25010000002 AMPICILLIN-SULBACTAM PER 1.5 G: Performed by: INTERNAL MEDICINE

## 2024-03-04 PROCEDURE — 71045 X-RAY EXAM CHEST 1 VIEW: CPT

## 2024-03-04 PROCEDURE — 82948 REAGENT STRIP/BLOOD GLUCOSE: CPT

## 2024-03-04 PROCEDURE — 82375 ASSAY CARBOXYHB QUANT: CPT

## 2024-03-04 PROCEDURE — 76604 US EXAM CHEST: CPT | Performed by: INTERNAL MEDICINE

## 2024-03-04 PROCEDURE — 94799 UNLISTED PULMONARY SVC/PX: CPT

## 2024-03-04 PROCEDURE — 63710000001 INSULIN REGULAR HUMAN PER 5 UNITS: Performed by: STUDENT IN AN ORGANIZED HEALTH CARE EDUCATION/TRAINING PROGRAM

## 2024-03-04 PROCEDURE — 83050 HGB METHEMOGLOBIN QUAN: CPT

## 2024-03-04 PROCEDURE — 99233 SBSQ HOSP IP/OBS HIGH 50: CPT | Performed by: INTERNAL MEDICINE

## 2024-03-04 PROCEDURE — 82948 REAGENT STRIP/BLOOD GLUCOSE: CPT | Performed by: STUDENT IN AN ORGANIZED HEALTH CARE EDUCATION/TRAINING PROGRAM

## 2024-03-04 PROCEDURE — 84100 ASSAY OF PHOSPHORUS: CPT | Performed by: INTERNAL MEDICINE

## 2024-03-04 PROCEDURE — 83605 ASSAY OF LACTIC ACID: CPT

## 2024-03-04 PROCEDURE — 94669 MECHANICAL CHEST WALL OSCILL: CPT

## 2024-03-04 PROCEDURE — 85027 COMPLETE CBC AUTOMATED: CPT | Performed by: STUDENT IN AN ORGANIZED HEALTH CARE EDUCATION/TRAINING PROGRAM

## 2024-03-04 PROCEDURE — 94664 DEMO&/EVAL PT USE INHALER: CPT

## 2024-03-04 PROCEDURE — 83735 ASSAY OF MAGNESIUM: CPT | Performed by: INTERNAL MEDICINE

## 2024-03-04 PROCEDURE — 25010000002 FUROSEMIDE PER 20 MG

## 2024-03-04 PROCEDURE — 80048 BASIC METABOLIC PNL TOTAL CA: CPT | Performed by: FAMILY MEDICINE

## 2024-03-04 PROCEDURE — 25010000002 HEPARIN (PORCINE) PER 1000 UNITS: Performed by: INTERNAL MEDICINE

## 2024-03-04 PROCEDURE — 25010000002 ACETAMINOPHEN 10 MG/ML SOLUTION

## 2024-03-04 PROCEDURE — 94761 N-INVAS EAR/PLS OXIMETRY MLT: CPT

## 2024-03-04 RX ORDER — FUROSEMIDE 10 MG/ML
40 INJECTION INTRAMUSCULAR; INTRAVENOUS ONCE
Status: DISCONTINUED | OUTPATIENT
Start: 2024-03-04 | End: 2024-03-04

## 2024-03-04 RX ORDER — SODIUM CHLORIDE, SODIUM LACTATE, CALCIUM CHLORIDE, MAGNESIUM CHLORIDE AND DEXTROSE 4.25; 538; 448; 18.3; 5.08 G/100ML; MG/100ML; MG/100ML; MG/100ML; MG/100ML
2000 INJECTION, SOLUTION INTRAPERITONEAL
Status: COMPLETED | OUTPATIENT
Start: 2024-03-04 | End: 2024-03-04

## 2024-03-04 RX ORDER — FUROSEMIDE 10 MG/ML
80 INJECTION INTRAMUSCULAR; INTRAVENOUS ONCE
Status: COMPLETED | OUTPATIENT
Start: 2024-03-04 | End: 2024-03-04

## 2024-03-04 RX ORDER — ACETAMINOPHEN 10 MG/ML
1000 INJECTION, SOLUTION INTRAVENOUS EVERY 8 HOURS PRN
Status: DISCONTINUED | OUTPATIENT
Start: 2024-03-04 | End: 2024-03-09 | Stop reason: HOSPADM

## 2024-03-04 RX ORDER — TORSEMIDE 20 MG/1
100 TABLET ORAL DAILY
Status: DISCONTINUED | OUTPATIENT
Start: 2024-03-04 | End: 2024-03-09 | Stop reason: HOSPADM

## 2024-03-04 RX ADMIN — HEPARIN SODIUM 5000 UNITS: 5000 INJECTION INTRAVENOUS; SUBCUTANEOUS at 21:32

## 2024-03-04 RX ADMIN — SODIUM CHLORIDE, SODIUM LACTATE, CALCIUM CHLORIDE, MAGNESIUM CHLORIDE AND DEXTROSE 6000 ML: 1.5; 538; 448; 18.3; 5.08 INJECTION, SOLUTION INTRAPERITONEAL at 20:00

## 2024-03-04 RX ADMIN — ARFORMOTEROL TARTRATE 15 MCG: 15 SOLUTION RESPIRATORY (INHALATION) at 07:28

## 2024-03-04 RX ADMIN — AMPICILLIN AND SULBACTAM 3 G: 2; 1 INJECTION, POWDER, FOR SOLUTION INTRAVENOUS at 09:39

## 2024-03-04 RX ADMIN — FUROSEMIDE 80 MG: 10 INJECTION, SOLUTION INTRAMUSCULAR; INTRAVENOUS at 06:00

## 2024-03-04 RX ADMIN — Medication 10 ML: at 21:33

## 2024-03-04 RX ADMIN — INSULIN HUMAN 2 UNITS: 100 INJECTION, SOLUTION PARENTERAL at 17:32

## 2024-03-04 RX ADMIN — ARFORMOTEROL TARTRATE 15 MCG: 15 SOLUTION RESPIRATORY (INHALATION) at 19:32

## 2024-03-04 RX ADMIN — BUDESONIDE 0.5 MG: 0.5 INHALANT RESPIRATORY (INHALATION) at 07:28

## 2024-03-04 RX ADMIN — ACETAMINOPHEN 1000 MG: 10 INJECTION INTRAVENOUS at 21:31

## 2024-03-04 RX ADMIN — Medication 10 ML: at 09:40

## 2024-03-04 RX ADMIN — HEPARIN SODIUM 5000 UNITS: 5000 INJECTION INTRAVENOUS; SUBCUTANEOUS at 06:21

## 2024-03-04 RX ADMIN — GENTAMICIN SULFATE 1 APPLICATION: 1 CREAM TOPICAL at 09:30

## 2024-03-04 RX ADMIN — BUDESONIDE 0.5 MG: 0.5 INHALANT RESPIRATORY (INHALATION) at 19:32

## 2024-03-04 RX ADMIN — SODIUM CHLORIDE, SODIUM LACTATE, CALCIUM CHLORIDE, MAGNESIUM CHLORIDE AND DEXTROSE 2000 ML: 4.25; 538; 448; 18.3; 5.08 INJECTION, SOLUTION INTRAPERITONEAL at 14:31

## 2024-03-04 RX ADMIN — HEPARIN SODIUM 5000 UNITS: 5000 INJECTION INTRAVENOUS; SUBCUTANEOUS at 14:47

## 2024-03-04 NOTE — SIGNIFICANT NOTE
Responded to RRT with Dr. Sandoval and Dr. uRdd.  Upon arrival to the bedside, RN informed us that patient was hypoxic, O2 sat in the 60s when RRT was called.  However, O2 sat in the 80s upon initiation of RRT.  Patient was placed on a nonrebreather and ABG was drawn.  ABG as follows pH 7.322, pCO2 50.2, PaO2 259.6, lactic 1.18.  At that time patient was weaned back down to nasal cannula.  Patient appeared lethargic, however he did not have his hearing aids and this was likely due to difficulty hearing. Had PD earlier.  Patient has not been receiving his torsemide, chest x-ray revealed pulmonary edema.  80 Mg IV Lasix ordered one-time.  Will defer further diuresis to day attending and nephrology.    Gabriela Workman PA-C

## 2024-03-04 NOTE — PLAN OF CARE
Goal Outcome Evaluation:               Patient had episode where oxygen saturation dropped down to the 60's. Patient was maxed out on oxygen without recovery. RRT called. Cxray and ABG's ordered. Patient given 80mg of Lasix. Patient now titrated down to 4.5 LPM of oxygen. No signs of acute distress present.

## 2024-03-04 NOTE — PROCEDURES
Bedside thoracic ultrasound:    Left showed B lines, curtain sign seen.    Right side showed small effusion with anechoic space between lung and diaphragm. Not large enough for safe thoracentesis.     Images stored online.

## 2024-03-04 NOTE — PLAN OF CARE
Goal Outcome Evaluation:  Plan of Care Reviewed With: spouse, caregiver Pt has slept most of this shift. Awakens to voice then drifts right back to sleep. He remains confused. Vital signs are stable. Had PD once this shift. Family remains at the bedside.

## 2024-03-04 NOTE — PROGRESS NOTES
Pulmonary / Critical Care Progress Note      Patient Name: Nelson Wang  : 1945  MRN: [id number]  Attending:  Devante Friedman MD  Date of admission: 2024    Subjective   Subjective   Follow-up for pneumonia    No acute events overnight.  RRT handley due to SpO2 being in the 60s    This morning,  On 4.5 L nasal cannula  Lying in bed  Patient restless overnight  Denies any chest pain or chest tightness  Denies cough or hemoptysis  No fever or chills    Objective   Objective     Vitals:   Temp:  [97.8 °F (36.6 °C)-98.1 °F (36.7 °C)] 98.1 °F (36.7 °C)  Heart Rate:  [52-93] 81  Resp:  [10-20] 10  BP: (121-152)/(52-99) 131/82  Flow (L/min):  [2-6] 6    Physical Exam   Vital Signs Reviewed   General:  WDWN, Alert, NAD. elderly male, lying in bed  HEENT:  PERRL, EOMI.  OP, nares clear  Chest: Diminished with coarse crackles on auscultation, some work of breathing noted on 4.5 L nasal cannula  CV: RRR, no MGR, pulses 2+, equal.  Abd:  Soft, NT, ND, + BS  EXT:  no clubbing, no cyanosis, no edema  Neuro:  A&Ox2, more confused this morning, CN grossly intact, no focal deficits.  Skin: No rashes or lesions noted, multiple wounds noted please see charting    Result Review    Result Review:  I have personally reviewed the results from the time of this admission to 3/4/2024 07:07 EST and agree with these findings:  [x]  Laboratory  [x]  Microbiology  [x]  Radiology  []  EKG/Telemetry   []  Cardiology/Vascular   []  Pathology  []  Old records  []  Other:  Most notable findings include:         Lab 24  0543 24  0255 24  1158 24  0527 24  0403 24  2254 24  2150   WBC  --  6.13 6.62 7.45 7.46  --  10.09   HEMOGLOBIN  --  9.4* 8.8* 8.8* 9.2*  --  10.9*   HEMATOCRIT  --  31.3* 28.4* 29.4* 29.9*  --  34.8*   PLATELETS  --  117* 107* 128* 155  --  230   SODIUM  --  140 139 138 136 139  --    SODIUM, ARTERIAL 137.6  --   --   --   --   --   --    POTASSIUM  --  3.6 3.3* 3.5 3.9 4.1  --     CHLORIDE  --  102 102 102 100 99  --    CO2  --  25.3 24.2 26.0 25.1 28.7  --    BUN  --  34* 35* 35* 33* 22  --    CREATININE  --  5.93* 6.36* 7.02* 6.40* 6.04*  --    GLUCOSE  --  209* 151* 142* 170* 164*  --    GLUCOSE, ARTERIAL 140*  --   --   --   --   --   --    CALCIUM  --  8.2* 7.7* 7.9* 8.0* 8.3*  --    PHOSPHORUS  --  4.9*  --  5.3*  --   --   --    TOTAL PROTEIN  --   --   --   --   --  5.9*  --    ALBUMIN  --   --   --   --   --  2.6*  --    GLOBULIN  --   --   --   --   --  3.3  --      Bronchoscopy BAL negative.  Bronchoscopy note personally reviewed  Assessment & Plan   Assessment / Plan     Active Hospital Problems:  Active Hospital Problems    Diagnosis     **Acute on chronic respiratory failure with hypoxia     HCAP (healthcare-associated pneumonia)     ESRD (end stage renal disease)     Aspiration pneumonitis     Peritoneal dialysis catheter in place     Essential hypertension     Anemia due to chronic kidney disease     Type 2 diabetes mellitus without complication      Impression:  Acute hypoxic respiratory failure requiring supplemental oxygen  Concern for aspiration pneumonia  NSTEMI type I versus type II  ESRD on peritoneal dialysis  Anemia of chronic kidney disease  T2DM with hyperglycemia  Hypomagnesemia       Plan:  -On 4.5 L nasal cannula.  Continue insulin oxygen to maintain SpO2 greater than 90%.  Does not wear any home O2 at baseline  -3/4 CXR demonstrates worsening bilateral infiltrates with a right pleural effusion  -Ultrasound utilized at bedside to evaluate for pleural effusion.  Very small pleural effusion present on the right side.  Not amenable to draining.  Will reevaluate in the next day or so to see if effusion increases  -Will make patient n.p.o. for concern of recurrent aspiration  -Patient is s/p bronchoscopy on 3/1.  Micro negative to date  -Continue Unasyn for total 7 days in the setting of aspiration pneumonia  -Continue Brovana, Pulmicort, DuoNebs  -Continue chest  physiotherapy  -Continue bronchopulmonary hygiene.  Encourage I-S and flutter  -Given 80 of Lasix x 1 overnight  -Continue to monitor renal panel and electrolytes.  Electrolytes as necessary  -Continue aspiration precautions  -SLP following, appreciate input  -Peritoneal dialysis per nephrology, appreciate input  -Continue delirium precautions and Seroquel for now.  Psychiatry to evaluate  -Follow-up pulmonary clinic 1 to 2 weeks after discharge     DVT prophylaxis:  Medical DVT prophylaxis orders are present.    CODE STATUS:   Level Of Support Discussed With: Patient  Code Status (Patient has no pulse and is not breathing): CPR (Attempt to Resuscitate)  Medical Interventions (Patient has pulse or is breathing): Full Support    I have reviewed labs, imaging, pertinent clinical data and provider notes.   I have discussed with bedside nurse and primary service.     Electronically signed by BECKY Rosenthal, 03/04/24, 2:45 PM EST.    Electronically signed by Preston Jimenez MD, 03/04/24, 7:07 AM EST.    This visit was performed by BOTH a physician and an APC. I personally evaluated and examined the patient. I performed all aspects of MDM as documented. , I have reviewed and confirmed the accuracy of the patient's history as documented in this note., and I have reexamined the patient and the results are consistent with the previously documented exam. I have updated the documentation as necessary.     Electronically signed by Preston Jimenez MD, 03/04/24, 4:12 PM EST.

## 2024-03-04 NOTE — PROGRESS NOTES
[facility]   Hospitalist Progress Note  Date: 3/4/2024  Patient Name: Nelson Wang  : 1945  MRN: [id number]  Date of admission: 2024      Subjective   Subjective     Chief Complaint: Follow-up shortness of breath    Summary:Nelson Wang is a 79 y.o. male  with past medical history of diabetes, end-stage renal disease on peritoneal dialysis, immunosuppression on Skyrizi, hypertension, hyperlipidemia, hearing impairment, CAD, vertigo, GERD presents to the ED with shortness of breath.  Patient was discharged from this hospital 5 days ago to inpatient treatment of E. coli bacteremia with septic shock, C. difficile colitis, peritonitis and pneumonia included ICU management.  Since his discharge patient has been mostly bedbound and lethargic due to generalized weakness and shortness of breath.  Patient's daughter (Dr. aWng) was concerned so she brought him to the ED for further evaluation.  In the ED patient was hypoxic on arrival requiring oxygen supplementation via nasal cannula.  Labs showed that he had a mildly elevated lactic acid with significantly elevated proBNP and troponin levels.  ABG was relatively unremarkable on nasal cannula.  Repeat troponin had a significant delta of +232.  No ischemic changes on EKG.  Patient denied any chest pain.  CT of the chest showed worsening progression of multifocal pneumonia much greater on the right and new small pleural effusions.  Patient denies any fevers, chills, chest pain, nausea, or vomiting.  Patient did have diarrhea that has been improving over the last few days.  Patient was admitted for further evaluation and treatment.  Started on empiric antibiotics.  Pulmonology, nephrology and cardiology consulted.  Patient started on heparin drip.  Pulmonology took patient for bronchoscopy which showed extremely inflamed and friable mucosa and easy bleeding with edema of the airways and mucous plugging with purulent secretions.  BAL negative by PCR.   Cultures growing normal lashae.  Patient became increasingly confused and agitated refractory to redirection.  Started on Seroquel at night without improvement.  Psychiatry consulted started on as needed Seroquel throughout the day with scheduled Seroquel at night.  Despite this patient remained agitated worse at night patient not sleeping at all for 3 days in a row.  Patient given Ativan and slept well still confused when awake.  Chest x-ray showed increased effusion and bilateral infiltrates.  Nephrology adjusting PD.  Cardiology planning on stress test as an outpatient after recovered from current illness    Interval Followup: Patient lying in bed sleeping awakens fairly easily but falls back to sleep quickly with family at the bedside.  Patient confused and anxious overnight and received Ativan which helped the patient's sleep though became hypoxic.  Placed on nonrebreather.  ABG showed mild respiratory acidosis.  Weaned to supplemental nasal cannula.  Patient not able to answer review of systems questions due to confusion.   Afebrile overnight.  Sinus rhythm first-degree block 80s to 100 on telemetry review.  Blood pressure within normal limits.  Satting well on 4 L nasal cannula.  Blood sugars fairly well-controlled.  Serum potassium within normal limits.  Hemoglobin low but trending up.  Platelets trending up.  No other issues per nursing.    Review of Systems  Unable to obtain secondary to altered mental status    Objective   Objective     Vitals:   Temp:  [97.7 °F (36.5 °C)-98.4 °F (36.9 °C)] 97.7 °F (36.5 °C)  Heart Rate:  [79-95] 95  Resp:  [10-20] 10  BP: (106-138)/(45-99) 106/63  Flow (L/min):  [4.5-6] 4.5  Physical Exam   General: well-developed appearing stated age in no acute distress  HEENT: Normocephalic atraumatic moist membranes pupils equal round reactive light, no scleral icterus no conjunctival injection  Cardiovascular: regular rate and rhythm no murmurs rubs or gallops S1-S2, no lower  extremity edema appreciated  Pulmonary: Crackles bilateral bases scant rhonchi no wheezes symmetric chest expansion, unlabored,   Gastrointestinal: Soft nontender nondistended positive bowel sounds all 4 quadrants no rebound or guarding, PD cath in place without erythema or drainage  Musculoskeletal: No clubbing cyanosis, warm and well-perfused, calves soft symmetric nontender bilaterally  Skin: Clean dry  Neuro: Patient not able to cooperate with exam due to altered mental status, spontaneously moving all 4 extremities no lateralizing deficits appreciated  Psych: Patient lethargic awakens to voice looks around the room addresses examiner and falls back asleep fairly quickly  : No Kolb catheter no bladder distention no suprapubic tenderness    Result Review    Result Review:  I have personally reviewed these results and agree with these findings:  [x]  Laboratory  LAB RESULTS:      Lab 03/04/24  0609 03/04/24  0543 03/04/24  0255 03/03/24  1158 03/02/24  0527 03/01/24  1050 03/01/24  0403 03/01/24  0030 02/29/24  1644 02/29/24  1046 02/29/24  0431 02/29/24  0150 02/28/24  2150   WBC  --   --  6.13 6.62 7.45  --  7.46  --   --   --   --   --  10.09   HEMOGLOBIN  --   --  9.4* 8.8* 8.8*  --  9.2*  --   --   --   --   --  10.9*   HEMATOCRIT  --   --  31.3* 28.4* 29.4*  --  29.9*  --   --   --   --   --  34.8*   PLATELETS  --   --  117* 107* 128*  --  155  --   --   --   --   --  230   NEUTROS ABS  --   --   --   --   --   --   --   --   --   --   --   --  7.14*   IMMATURE GRANS (ABS)  --   --   --   --   --   --   --   --   --   --   --   --  0.13*   LYMPHS ABS  --   --   --   --   --   --   --   --   --   --   --   --  1.53   MONOS ABS  --   --   --   --   --   --   --   --   --   --   --   --  0.79   EOS ABS  --   --   --   --   --   --   --   --   --   --   --   --  0.45*   MCV  --   --  97.2* 96.9 98.0*  --  98.4*  --   --   --   --   --  96.4   CRP  --   --   --   --   --   --   --   --   --   --   --  5.42*   --    PROCALCITONIN  --   --   --   --   --   --   --   --   --   --   --  0.73*  --    LACTATE 1.3  --   --   --   --   --   --   --   --   --  1.8  --  2.6*   LACTATE, ARTERIAL  --  1.18  --   --   --   --   --   --   --   --   --   --   --    PROTIME  --   --   --   --   --   --   --   --   --   --  14.7  --   --    APTT  --   --   --   --   --  61.7*  --  67.6* 57.4* >200.0* 30.0*  --   --          Lab 03/04/24  0543 03/04/24  0255 03/03/24  1158 03/02/24  0527 03/01/24  0403 02/29/24  0150 02/28/24  2254   SODIUM  --  140 139 138 136  --  139   SODIUM, ARTERIAL 137.6  --   --   --   --   --   --    POTASSIUM  --  3.6 3.3* 3.5 3.9  --  4.1   CHLORIDE  --  102 102 102 100  --  99   CO2  --  25.3 24.2 26.0 25.1  --  28.7   ANION GAP  --  12.7 12.8 10.0 10.9  --  11.3   BUN  --  34* 35* 35* 33*  --  22   CREATININE  --  5.93* 6.36* 7.02* 6.40*  --  6.04*   EGFR  --  9.1* 8.4* 7.4* 8.3*  --  8.9*   GLUCOSE  --  209* 151* 142* 170*  --  164*   GLUCOSE, ARTERIAL 140*  --   --   --   --   --   --    CALCIUM  --  8.2* 7.7* 7.9* 8.0*  --  8.3*   IONIZED CALCIUM 1.09*  --   --   --   --   --   --    MAGNESIUM  --  1.7  --  1.6  --   --   --    PHOSPHORUS  --  4.9*  --  5.3*  --   --   --    TSH  --   --   --   --   --  8.760*  --          Lab 02/28/24  2254   TOTAL PROTEIN 5.9*   ALBUMIN 2.6*   GLOBULIN 3.3   ALT (SGPT) 13   AST (SGOT) 34   BILIRUBIN 0.3   ALK PHOS 65         Lab 02/29/24  0431 02/29/24  0150 02/28/24  2254 02/28/24  2150   PROBNP  --   --   --  >70,000.0*   HSTROP T  --  428* 196*  --    PROTIME 14.7  --   --   --    INR 1.13  --   --   --                  Lab 03/04/24  0543 02/29/24  0013   PH, ARTERIAL 7.322* 7.394   PCO2, ARTERIAL 50.2* 44.6   PO2 .6* 84.0   O2 SATURATION ART 98.7 94.8*   FIO2  --  32   HCO3 ART 25.4 26.6*   BASE EXCESS ART -1.0 1.4   CARBOXYHEMOGLOBIN 0.3 0.1     Brief Urine Lab Results  (Last result in the past 365 days)        Color   Clarity   Blood   Leuk Est   Nitrite    Protein   CREAT   Urine HCG        02/16/24 1127 Dark Yellow   Clear   Negative   Negative   Negative   100 mg/dL (2+)                 Microbiology Results (last 10 days)       Procedure Component Value - Date/Time    Respiratory Culture - Wash, Bronchus [[id number]] Collected: 03/01/24 0932    Lab Status: Final result Specimen: Wash from Bronchus Updated: 03/03/24 1133     Respiratory Culture Moderate growth (3+) Normal respiratory lashae. No S. aureus or Pseudomonas aeruginosa detected. Final report.     Gram Stain Few (2+) Gram positive cocci in pairs and chains      Few (2+) WBCs seen    AFB Culture - Lavage, Lung, Right Lower Lobe [[id number]] Collected: 03/01/24 0922    Lab Status: Preliminary result Specimen: Lavage from Lung, Right Lower Lobe Updated: 03/04/24 1506     AFB Stain No acid fast bacilli seen on direct smear      No acid fast bacilli seen on concentrated smear    BAL Culture, Quantitative - Lavage, Lung, Right Lower Lobe [[id number]] Collected: 03/01/24 0922    Lab Status: Final result Specimen: Lavage from Lung, Right Lower Lobe Updated: 03/03/24 1132     BAL Culture 50,000 CFU/mL Normal respiratory lashae. No S. aureus or Pseudomonas aeruginosa detected. Final report.     Gram Stain Few (2+) Gram positive cocci in pairs and chains      Rare (1+) WBCs seen    Pneumonia Panel - Lavage, Lung, Right Lower Lobe [[id number]]  (Normal) Collected: 03/01/24 0922    Lab Status: Final result Specimen: Lavage from Lung, Right Lower Lobe Updated: 03/01/24 1145     Escherichia coli PCR Not Detected     Acinetobacter calcoaceticus-baumannii complex PCR Not Detected     Enterobacter cloacae PCR Not Detected     Klebsiella oxytoca PCR Not Detected     Klebsiella pneumoniae group PCR Not Detected     Klebsiella aerogenes PCR Not Detected     Moraxella catarrhalis PCR Not Detected     Proteus species PCR Not Detected     Pseudomonas aeroginosa PCR Not Detected     Serratia marcescens PCR Not Detected     Staphylococcus  aureus PCR Not Detected     Streptococcus pyogenes PCR Not Detected     Haemophilus influenzae PCR Not Detected     Streptococcus agalactiae PCR Not Detected     Streptococcus pneumoniae PCR Not Detected     Chlamydophila pneumoniae PCR Not Detected     Legionella pneumophilia PCR Not Detected     Mycoplasma pneumo by PCR Not Detected     ADENOVIRUS, PCR Not Detected     CTX-M Gene N/A     IMP Gene N/A     KPC Gene N/A     mecA/C and MREJ Gene N/A     NDM Gene N/A     OXA-48-like Gene N/A     VIM Gene N/A     Coronavirus Not Detected     Human Metapneumovirus Not Detected     Human Rhinovirus/Enterovirus Not Detected     Influenza A PCR Not Detected     Influenza B PCR Not Detected     RSV, PCR Not Detected     Parainfluenza virus PCR Not Detected    Respiratory Panel PCR w/COVID-19(SARS-CoV-2) RA/TANIA/GAVIN/PAD/[facility]NA In-House, NP Swab in UTM/VTM, 2 HR TAT - Swab, Nasopharynx [[id number]]  (Normal) Collected: 02/29/24 0959    Lab Status: Final result Specimen: Swab from Nasopharynx Updated: 02/29/24 1131     ADENOVIRUS, PCR Not Detected     Coronavirus 229E Not Detected     Coronavirus HKU1 Not Detected     Coronavirus NL63 Not Detected     Coronavirus OC43 Not Detected     COVID19 Not Detected     Human Metapneumovirus Not Detected     Human Rhinovirus/Enterovirus Not Detected     Influenza A PCR Not Detected     Influenza B PCR Not Detected     Parainfluenza Virus 1 Not Detected     Parainfluenza Virus 2 Not Detected     Parainfluenza Virus 3 Not Detected     Parainfluenza Virus 4 Not Detected     RSV, PCR Not Detected     Bordetella pertussis pcr Not Detected     Bordetella parapertussis PCR Not Detected     Chlamydophila pneumoniae PCR Not Detected     Mycoplasma pneumo by PCR Not Detected    Narrative:      In the setting of a positive respiratory panel with a viral infection PLUS a negative procalcitonin without other underlying concern for bacterial infection, consider observing off antibiotics or  discontinuation of antibiotics and continue supportive care. If the respiratory panel is positive for atypical bacterial infection (Bordetella pertussis, Chlamydophila pneumoniae, or Mycoplasma pneumoniae), consider antibiotic de-escalation to target atypical bacterial infection.    MRSA Screen, PCR (Inpatient) - Swab, Nares [[id number]]  (Normal) Collected: 02/29/24 0557    Lab Status: Final result Specimen: Swab from Nares Updated: 02/29/24 0826     MRSA PCR No MRSA Detected    Narrative:      The negative predictive value of this diagnostic test is high and should only be used to consider de-escalating anti-MRSA therapy. A positive result may indicate colonization with MRSA and must be correlated clinically.    COVID-19, FLU A/B, RSV PCR 1 HR TAT - Swab, Nasopharynx [[id number]]  (Normal) Collected: 02/28/24 2201    Lab Status: Final result Specimen: Swab from Nasopharynx Updated: 02/28/24 2244     COVID19 Not Detected     Influenza A PCR Not Detected     Influenza B PCR Not Detected     RSV, PCR Not Detected    Narrative:      Fact sheet for providers: https://www.fda.gov/media/791860/download    Fact sheet for patients: https://www.fda.gov/media/128263/download    Test performed by PCR.    Blood Culture - Blood, Hand, Left [[id number]]  (Normal) Collected: 02/28/24 2150    Lab Status: Preliminary result Specimen: Blood from Hand, Left Updated: 03/03/24 2200     Blood Culture No growth at 4 days    Blood Culture - Blood, Hand, Left [[id number]]  (Normal) Collected: 02/28/24 2150    Lab Status: Preliminary result Specimen: Blood from Hand, Left Updated: 03/03/24 2200     Blood Culture No growth at 4 days            [x]  Microbiology  [x]  Radiology  XR Chest 1 View    Result Date: 3/4/2024   Worsening of bilateral infiltrates with worsening of a right pleural effusion.       ERIC ELLISON MD       Electronically Signed and Approved By: ERIC ELLISON MD on 3/04/2024 at 6:12             CT Chest Without Contrast  Diagnostic    Result Date: 2/29/2024   Worsening progression is suggested by CT increased bilateral pulmonary consolidation, much greater on the right than the left.  The findings suggest infectious multifocal pneumonia.  Aspiration pneumonia be excluded (especially in the right lower lobe).  New or increased pleural effusions are seen (small in volume).  Gallstones are present without definite acute cholecystitis.  Please see above comments for further detail.     Please note that portions of this note were completed with a voice recognition program.  АНДРЕЙ DE LUNA JR, MD       Electronically Signed and Approved By: АНДРЕЙ DE LUNA JR, MD on 2/29/2024 at 0:10              XR Chest 1 View    Result Date: 2/28/2024   Improving right basilar density compatible with persistent but resolving pneumonia.  The right IJ central venous catheter has been removed.  No new findings.       RENITA BARRAZA DO       Electronically Signed and Approved By: RENITA BARRAZA DO on 2/28/2024 at 22:19              [x]  EKG/Telemetry   []  Cardiology/Vascular   []  Pathology  []  Old records  [x]  Other:  Scheduled Meds:!Patient Home Medications Stored on Unit, , Does not apply, BID  ampicillin-sulbactam, 3 g, Intravenous, Q24H  arformoterol, 15 mcg, Nebulization, BID - RT  aspirin, 81 mg, Oral, Daily  atorvastatin, 40 mg, Oral, Daily  budesonide, 0.5 mg, Nebulization, BID - RT  gentamicin, 1 Application, Topical, Daily  guaiFENesin, 600 mg, Oral, Q12H  heparin (porcine), 5,000 Units, Subcutaneous, Q8H  dianeal lo-peter 1.5%, 6,000 mL, Intraperitoneal, Daily  insulin regular, 2-9 Units, Subcutaneous, Q6H  levothyroxine, 125 mcg, Oral, Q AM  melatonin, 5 mg, Oral, Nightly  nystatin, 5 mL, Swish & Swallow, 4x Daily  QUEtiapine, 12.5 mg, Oral, Nightly  saccharomyces boulardii, 250 mg, Oral, BID  sevelamer, 1,600 mg, Oral, BID With Meals  sodium chloride, 10 mL, Intravenous, Q12H  torsemide, 100 mg, Oral, Daily      Continuous Infusions:   PRN  Meds:.  acetaminophen    benzonatate    senna-docusate sodium **AND** polyethylene glycol **AND** bisacodyl **AND** bisacodyl    dextrose    dextrose    glucagon (human recombinant)    Hydrocod Son-Chlorphe Son ER    ipratropium-albuterol    ondansetron    QUEtiapine    sodium chloride    sodium chloride    sodium chloride      Assessment & Plan   Assessment / Plan     Assessment/Plan:  Acute on chronic hypoxic respiratory failure  Concern for worsening multifocal pneumonia versus aspiration pneumonia  Mucous plugging of the airway  Right pleural effusion  Elevated troponin unsure if type I or type II NSTEMI  End-stage renal disease on peritoneal dialysis  Diabetes mellitus  Hypertension  Hypothyroidism  Recent E. coli bacteremia  Recent C. difficile colitis  Delirium      Patient admitted for further evaluation and treatment  Pulmonology critical care, nephrology, cardiology and psychiatry consulted thank you for assistance  Continue supplemental oxygen as needed to keep sats greater than 90%  Continue ampicillin-sulbactam per pulmonology  Normal lashae on BAL cultures  Continue Brovana, Pulmicort  Continue bronchopulmonary hygiene protocol  Continue chest physiotherapy  Continue aspirin atorvastatin  Follow-up with cardiology for outpatient stress testing once recovered from current illness  Continue PD with home cycler at night and titrate per nephrology  Continue sevelamer  Continue sliding scale insulin  Continue home Synthroid  Continue probiotics while taking antibiotics  Continue Seroquel at night scheduled with as needed Seroquel through the day every 8 hours  Further inpatient orders recommendations pending clinical course         Discussed plan with bedside RN as well as Dr. Castro nephrology.    Disposition: Home once cleared by pulmonology and mental status improved to follow-up with pulmonology and cardiology as an outpatient.  Patient will need outpatient stress testing per cardiology.    DVT  prophylaxis:  Medical DVT prophylaxis orders are present.        CODE STATUS:   Level Of Support Discussed With: Patient  Code Status (Patient has no pulse and is not breathing): CPR (Attempt to Resuscitate)  Medical Interventions (Patient has pulse or is breathing): Full Support

## 2024-03-04 NOTE — PLAN OF CARE
Goal Outcome Evaluation:               Patient is resting peacefully in bed with eyes closed. Open's eyes to voice. Patient was agitated earlier in shift. Patient kept trying to get out of bed and pulling at tubes/wires. Night shift practitioner notified. Received order to give a one time dose of Ativan see MAR.

## 2024-03-05 ENCOUNTER — APPOINTMENT (OUTPATIENT)
Dept: CT IMAGING | Facility: HOSPITAL | Age: 79
DRG: 193 | End: 2024-03-05
Payer: MEDICARE

## 2024-03-05 LAB
ANION GAP SERPL CALCULATED.3IONS-SCNC: 15.4 MMOL/L (ref 5–15)
BUN SERPL-MCNC: 32 MG/DL (ref 8–23)
BUN/CREAT SERPL: 5.2 (ref 7–25)
CALCIUM SPEC-SCNC: 8.2 MG/DL (ref 8.6–10.5)
CHLORIDE SERPL-SCNC: 101 MMOL/L (ref 98–107)
CO2 SERPL-SCNC: 23.6 MMOL/L (ref 22–29)
CREAT SERPL-MCNC: 6.11 MG/DL (ref 0.76–1.27)
CYTO UR: NORMAL
DEPRECATED RDW RBC AUTO: 64.5 FL (ref 37–54)
EGFRCR SERPLBLD CKD-EPI 2021: 8.8 ML/MIN/1.73
ERYTHROCYTE [DISTWIDTH] IN BLOOD BY AUTOMATED COUNT: 17.3 % (ref 12.3–15.4)
GLUCOSE BLDC GLUCOMTR-MCNC: 128 MG/DL (ref 70–99)
GLUCOSE BLDC GLUCOMTR-MCNC: 132 MG/DL (ref 70–99)
GLUCOSE BLDC GLUCOMTR-MCNC: 134 MG/DL (ref 70–99)
GLUCOSE BLDC GLUCOMTR-MCNC: 149 MG/DL (ref 70–99)
GLUCOSE BLDC GLUCOMTR-MCNC: 152 MG/DL (ref 70–99)
GLUCOSE SERPL-MCNC: 200 MG/DL (ref 65–99)
HCT VFR BLD AUTO: 35.3 % (ref 37.5–51)
HGB BLD-MCNC: 10.2 G/DL (ref 13–17.7)
LAB AP CASE REPORT: NORMAL
LAB AP CLINICAL INFORMATION: NORMAL
LAB AP SPECIAL STAINS: NORMAL
MCH RBC QN AUTO: 29.5 PG (ref 26.6–33)
MCHC RBC AUTO-ENTMCNC: 28.9 G/DL (ref 31.5–35.7)
MCV RBC AUTO: 102 FL (ref 79–97)
PATH REPORT.FINAL DX SPEC: NORMAL
PATH REPORT.GROSS SPEC: NORMAL
PLATELET # BLD AUTO: 141 10*3/MM3 (ref 140–450)
PMV BLD AUTO: 11.2 FL (ref 6–12)
POTASSIUM SERPL-SCNC: 4 MMOL/L (ref 3.5–5.2)
RBC # BLD AUTO: 3.46 10*6/MM3 (ref 4.14–5.8)
SODIUM SERPL-SCNC: 140 MMOL/L (ref 136–145)
WBC NRBC COR # BLD AUTO: 4.23 10*3/MM3 (ref 3.4–10.8)

## 2024-03-05 PROCEDURE — 25010000002 HEPARIN (PORCINE) PER 1000 UNITS: Performed by: INTERNAL MEDICINE

## 2024-03-05 PROCEDURE — 82948 REAGENT STRIP/BLOOD GLUCOSE: CPT

## 2024-03-05 PROCEDURE — 92526 ORAL FUNCTION THERAPY: CPT

## 2024-03-05 PROCEDURE — 99233 SBSQ HOSP IP/OBS HIGH 50: CPT | Performed by: INTERNAL MEDICINE

## 2024-03-05 PROCEDURE — 25010000002 AMPICILLIN-SULBACTAM PER 1.5 G: Performed by: INTERNAL MEDICINE

## 2024-03-05 PROCEDURE — 94799 UNLISTED PULMONARY SVC/PX: CPT

## 2024-03-05 PROCEDURE — 70450 CT HEAD/BRAIN W/O DYE: CPT

## 2024-03-05 PROCEDURE — 94760 N-INVAS EAR/PLS OXIMETRY 1: CPT

## 2024-03-05 PROCEDURE — 94669 MECHANICAL CHEST WALL OSCILL: CPT

## 2024-03-05 PROCEDURE — 94664 DEMO&/EVAL PT USE INHALER: CPT

## 2024-03-05 PROCEDURE — 25010000002 ACETAMINOPHEN 10 MG/ML SOLUTION

## 2024-03-05 PROCEDURE — 85027 COMPLETE CBC AUTOMATED: CPT | Performed by: STUDENT IN AN ORGANIZED HEALTH CARE EDUCATION/TRAINING PROGRAM

## 2024-03-05 PROCEDURE — 80048 BASIC METABOLIC PNL TOTAL CA: CPT | Performed by: FAMILY MEDICINE

## 2024-03-05 PROCEDURE — 94761 N-INVAS EAR/PLS OXIMETRY MLT: CPT

## 2024-03-05 PROCEDURE — 82948 REAGENT STRIP/BLOOD GLUCOSE: CPT | Performed by: STUDENT IN AN ORGANIZED HEALTH CARE EDUCATION/TRAINING PROGRAM

## 2024-03-05 RX ORDER — CETIRIZINE HYDROCHLORIDE 10 MG/1
5 TABLET ORAL ONCE AS NEEDED
Status: COMPLETED | OUTPATIENT
Start: 2024-03-05 | End: 2024-03-05

## 2024-03-05 RX ORDER — DIPHENHYDRAMINE HYDROCHLORIDE, ZINC ACETATE 2; .1 G/100G; G/100G
1 CREAM TOPICAL 3 TIMES DAILY PRN
Status: DISCONTINUED | OUTPATIENT
Start: 2024-03-05 | End: 2024-03-09 | Stop reason: HOSPADM

## 2024-03-05 RX ORDER — SODIUM CHLORIDE, SODIUM LACTATE, CALCIUM CHLORIDE, MAGNESIUM CHLORIDE AND DEXTROSE 2.5; 538; 448; 18.3; 5.08 G/100ML; MG/100ML; MG/100ML; MG/100ML; MG/100ML
2000 INJECTION, SOLUTION INTRAPERITONEAL
Status: CANCELLED | OUTPATIENT
Start: 2024-03-05 | End: 2024-03-05

## 2024-03-05 RX ORDER — SODIUM CHLORIDE, SODIUM LACTATE, CALCIUM CHLORIDE, MAGNESIUM CHLORIDE AND DEXTROSE 2.5; 538; 448; 18.3; 5.08 G/100ML; MG/100ML; MG/100ML; MG/100ML; MG/100ML
3000 INJECTION, SOLUTION INTRAPERITONEAL
Status: DISCONTINUED | OUTPATIENT
Start: 2024-03-05 | End: 2024-03-05

## 2024-03-05 RX ADMIN — NYSTATIN 500000 UNITS: 100000 SUSPENSION ORAL at 13:53

## 2024-03-05 RX ADMIN — Medication 10 ML: at 08:21

## 2024-03-05 RX ADMIN — SEVELAMER CARBONATE 1600 MG: 800 TABLET, FILM COATED ORAL at 18:03

## 2024-03-05 RX ADMIN — Medication 250 MG: at 21:16

## 2024-03-05 RX ADMIN — HEPARIN SODIUM 5000 UNITS: 5000 INJECTION INTRAVENOUS; SUBCUTANEOUS at 21:17

## 2024-03-05 RX ADMIN — BUDESONIDE 0.5 MG: 0.5 INHALANT RESPIRATORY (INHALATION) at 07:12

## 2024-03-05 RX ADMIN — HEPARIN SODIUM 5000 UNITS: 5000 INJECTION INTRAVENOUS; SUBCUTANEOUS at 13:52

## 2024-03-05 RX ADMIN — TORSEMIDE 100 MG: 20 TABLET ORAL at 10:29

## 2024-03-05 RX ADMIN — NYSTATIN 500000 UNITS: 100000 SUSPENSION ORAL at 21:17

## 2024-03-05 RX ADMIN — ARFORMOTEROL TARTRATE 15 MCG: 15 SOLUTION RESPIRATORY (INHALATION) at 18:52

## 2024-03-05 RX ADMIN — GENTAMICIN SULFATE 1 APPLICATION: 1 CREAM TOPICAL at 11:18

## 2024-03-05 RX ADMIN — Medication 10 ML: at 21:17

## 2024-03-05 RX ADMIN — Medication 250 MG: at 10:28

## 2024-03-05 RX ADMIN — BUDESONIDE 0.5 MG: 0.5 INHALANT RESPIRATORY (INHALATION) at 18:52

## 2024-03-05 RX ADMIN — IPRATROPIUM BROMIDE AND ALBUTEROL SULFATE 3 ML: .5; 3 SOLUTION RESPIRATORY (INHALATION) at 00:51

## 2024-03-05 RX ADMIN — ACETAMINOPHEN 1000 MG: 10 INJECTION INTRAVENOUS at 06:29

## 2024-03-05 RX ADMIN — NYSTATIN 500000 UNITS: 100000 SUSPENSION ORAL at 18:16

## 2024-03-05 RX ADMIN — ASPIRIN 81 MG: 81 TABLET, COATED ORAL at 10:29

## 2024-03-05 RX ADMIN — HEPARIN SODIUM 5000 UNITS: 5000 INJECTION INTRAVENOUS; SUBCUTANEOUS at 05:40

## 2024-03-05 RX ADMIN — CETIRIZINE HYDROCHLORIDE 5 MG: 10 TABLET, FILM COATED ORAL at 23:06

## 2024-03-05 RX ADMIN — Medication 5 MG: at 21:16

## 2024-03-05 RX ADMIN — IPRATROPIUM BROMIDE AND ALBUTEROL SULFATE 3 ML: .5; 3 SOLUTION RESPIRATORY (INHALATION) at 22:58

## 2024-03-05 RX ADMIN — AMPICILLIN AND SULBACTAM 3 G: 2; 1 INJECTION, POWDER, FOR SOLUTION INTRAVENOUS at 08:19

## 2024-03-05 RX ADMIN — ARFORMOTEROL TARTRATE 15 MCG: 15 SOLUTION RESPIRATORY (INHALATION) at 07:12

## 2024-03-05 RX ADMIN — ATORVASTATIN CALCIUM 40 MG: 40 TABLET, FILM COATED ORAL at 10:28

## 2024-03-05 NOTE — PLAN OF CARE
Goal Outcome Evaluation:               Patient resting in bed peacefully with eyes closed. Opens eyes to voice. No signs of distress present.

## 2024-03-05 NOTE — PLAN OF CARE
Problem: Adult Inpatient Plan of Care  Goal: Plan of Care Review  Outcome: Ongoing, Progressing  Goal: Patient-Specific Goal (Individualized)  Outcome: Ongoing, Progressing  Goal: Absence of Hospital-Acquired Illness or Injury  Outcome: Ongoing, Progressing  Intervention: Identify and Manage Fall Risk  Intervention: Prevent Skin Injury  Intervention: Prevent and Manage VTE (Venous Thromboembolism) Risk  Intervention: Prevent Infection  Goal: Optimal Comfort and Wellbeing  Outcome: Ongoing, Progressing  Intervention: Provide Person-Centered Care  Goal: Readiness for Transition of Care  Outcome: Ongoing, Progressing   Goal Outcome Evaluation:  Plan of Care Reviewed With: patient, spouse, family        Progress: improving  Outcome Evaluation: Patient A&O except to time. Passed swallow test, diet resumed. Family and sitter at bedside.sitting at bedside call light in reach

## 2024-03-05 NOTE — THERAPY TREATMENT NOTE
Acute Care - Speech Language Pathology   Swallow Treatment Note  Nate     Patient Name: Nelson Wang  : 1945  MRN: [id number]  Today's Date: 3/5/2024               Admit Date: 2024    Visit Dx:     ICD-10-CM ICD-9-CM   1. Acute respiratory failure with hypoxia  J96.01 518.81   2. Multifocal pneumonia  J18.9 486   3. Difficulty in walking  R26.2 719.7   4. Decreased activities of daily living (ADL)  Z78.9 V49.89   5. Oropharyngeal dysphagia  R13.12 787.22   6. Aspiration pneumonitis  J69.0 507.0     Patient Active Problem List   Diagnosis    Essential hypertension    Bradycardia, sinus    Anemia due to chronic kidney disease    Hypothyroidism    Idiopathic gout    Proteinuria    Psoriasis    Thrombocytopenia    Type 2 diabetes mellitus without complication    CKD (chronic kidney disease), stage IV    Hyperlipidemia    Monoclonal gammopathy of unknown significance (MGUS)    Stage 5 chronic kidney disease    Peritoneal dialysis catheter in place    Chronic gout without tophus    Peritoneal dialysis catheter dysfunction    Medicare annual wellness visit, subsequent    Chronic cough    Peritonitis    HCAP (healthcare-associated pneumonia)    Acute on chronic respiratory failure with hypoxia    ESRD (end stage renal disease)    Aspiration pneumonitis     Past Medical History:   Diagnosis Date    Anemia     Ankle pain, right     CKD (chronic kidney disease), stage IV     Diabetes     Gout     High cholesterol     HL (hearing loss)     Hyperkalemia     Hypertension     Hypothyroidism     Psoriasis     Vitiligo      Past Surgical History:   Procedure Laterality Date    ANKLE SURGERY  1990    APPENDECTOMY N/A     BRONCHOSCOPY N/A 3/1/2024    Procedure: BRONCHOSCOPY WITH BAL AND WASHINGS;  Surgeon: Sandra Espinal MD;  Location: [facility] ENDOSCOPY;  Service: Pulmonary;  Laterality: N/A;  PNEUMONIA    COLONOSCOPY N/A 2022    [facility]    HIP BIPOLAR REPLACEMENT Right 2000    INSERTION PERITONEAL  DIALYSIS CATHETER N/A 3/27/2023    Procedure: LAPAROSCOPIC INSERTION PERITONEAL DIALYSIS CATHETER, LAPAROSCOPIC OMENTOPEXY WITH LYSIS OF ADHESIONS;  Surgeon: Jose Berry MD;  Location: [facility] OR;  Service: General;  Laterality: N/A;    INSERTION PERITONEAL DIALYSIS CATHETER Left 7/23/2023    Procedure: REVISION OF PERITONEAL DIALYSIS CATHETER;  Surgeon: Radha Oreilly MD;  Location: [facility] MAIN OR;  Service: General;  Laterality: Left;    RENAL BIOPSY Left 07/15/2022         SPEECH PATHOLOGY DYSPHAGIA TREATMENT    Subjective/Behavioral Observations: Alert and cooperative, some confusion noted, patient hard of hearing.  Patient had been made n.p.o. following period of lethargy.      Day/time of Treatment: 3/5/2024      Current Diet: N.p.o.      Current Strategies:Alternate small bites and small sips of solids and liquids at a slow rate.  Small single sips of liquid with no straw.  Medications whole in applesauce. Reflux precautions.       Treatment received: Dysphagia therapy to address swallow function through exercises and education of strategies.      Results of treatment: Nectar liquid by spoon initially with decreased closure to spoon, delayed initiation of swallow with swallow completed.  Nectar liquid by cup appeared timely with vocal quality remaining clear to cervical auscultation.   Purée solid with swallow completed with laryngeal elevation noted to palpation.  Crunchy solid with patient taking large bite, adequate chewing followed by swallow completed clearing the oral cavity.      Progress toward goals: Adequate.  Patient awake and alert assisting to feed self.      Barriers to Achieving goals: Medical status      Plan of care:/changes in plan: Continue per plan, reinstate diet with: 1.  Diet: Regular soft moist solids, nectar thickened liquid.  No straw.  2.  Positioning fully upright for all p.o. intake and 30 minutes following.  3.  Alternate small bites and small sips of solids  and liquids at a slow rate.  Small single sips of liquid with no straw.  Medications whole in applesauce. Reflux precautions.                                                                                           Plan of Care Reviewed With: patient, spouse          EDUCATION  The patient has been educated in the following areas:   Modified Diet Instruction.              Time Calculation:    Time Calculation- SLP       Row Name 03/05/24 0942             Time Calculation- SLP    SLP Stop Time 0900  -TB      SLP Received On 03/05/24  -TB         Untimed Charges    56178-HH Treatment Swallow Minutes 40  -TB         Total Minutes    Untimed Charges Total Minutes 40  -TB       Total Minutes 40  -TB                User Key  (r) = Recorded By, (t) = Taken By, (c) = Cosigned By      Initials Name Provider Type    TB Katie Isaacs SLP Speech and Language Pathologist                    Therapy Charges for Today       Code Description Service Date Service Provider Modifiers Qty    [id number] HC ST TREATMENT SWALLOW 3 3/5/2024 Katie Isaacs SLP GN 1                 ADILSON Álvarez  3/5/2024

## 2024-03-05 NOTE — PROGRESS NOTES
Pulmonary / Critical Care Progress Note      Patient Name: Nelson Wang  : 1945  MRN: [id number]  Attending:  Saúl Varags MD  Date of admission: 2024    Subjective   Subjective   Follow-up for pneumonia    Over the past 24 hours: Remains on Unasyn course.  Continues Brovana, Pulmicort, and DuoNebs.  Remains on Demadex    No acute events overnight.     This morning,  Currently on room air  Lying in bed with family at bedside  Mentation much improved today  Reports feeling well today  Denies any chest pain or chest tightness  Denies cough or hemoptysis  No fever or chills    Objective   Objective     Vitals:   Temp:  [97.7 °F (36.5 °C)-98.4 °F (36.9 °C)] 98.2 °F (36.8 °C)  Heart Rate:  [] 92  Resp:  [10-18] 18  BP: (106-139)/(45-91) 139/91  Flow (L/min):  [3-6] 3.5    Physical Exam   Vital Signs Reviewed   General:  WDWN, Alert, NAD. elderly male, lying in bed  HEENT:  PERRL, EOMI.  OP, nares clear  Chest: Diminished with coarse crackles on auscultation, no work of breathing noted on room air  CV: RRR, no MGR, pulses 2+, equal.  Abd:  Soft, NT, ND, + BS  EXT:  no clubbing, no cyanosis, no edema  Neuro:  A&Ox2, more confused this morning, CN grossly intact, no focal deficits.  Skin: No rashes or lesions noted, multiple wounds noted please see charting    Result Review    Result Review:  I have personally reviewed the results from the time of this admission to 3/5/2024 06:59 EST and agree with these findings:  [x]  Laboratory  [x]  Microbiology  [x]  Radiology  []  EKG/Telemetry   []  Cardiology/Vascular   []  Pathology  []  Old records  []  Other:  Most notable findings include:         Lab 24  0415 24  0543 24  0255 24  1158 24  0527 24  0403 24  2254 24  2150   WBC 4.23  --  6.13 6.62 7.45 7.46  --  10.09   HEMOGLOBIN 10.2*  --  9.4* 8.8* 8.8* 9.2*  --  10.9*   HEMATOCRIT 35.3*  --  31.3* 28.4* 29.4* 29.9*  --  34.8*   PLATELETS 141  --  117*  107* 128* 155  --  230   SODIUM 140  --  140 139 138 136 139  --    SODIUM, ARTERIAL  --  137.6  --   --   --   --   --   --    POTASSIUM 4.0  --  3.6 3.3* 3.5 3.9 4.1  --    CHLORIDE 101  --  102 102 102 100 99  --    CO2 23.6  --  25.3 24.2 26.0 25.1 28.7  --    BUN 32*  --  34* 35* 35* 33* 22  --    CREATININE 6.11*  --  5.93* 6.36* 7.02* 6.40* 6.04*  --    GLUCOSE 200*  --  209* 151* 142* 170* 164*  --    GLUCOSE, ARTERIAL  --  140*  --   --   --   --   --   --    CALCIUM 8.2*  --  8.2* 7.7* 7.9* 8.0* 8.3*  --    PHOSPHORUS  --   --  4.9*  --  5.3*  --   --   --    TOTAL PROTEIN  --   --   --   --   --   --  5.9*  --    ALBUMIN  --   --   --   --   --   --  2.6*  --    GLOBULIN  --   --   --   --   --   --  3.3  --      Bronchoscopy BAL negative.  Bronchoscopy note personally reviewed  Assessment & Plan   Assessment / Plan     Active Hospital Problems:  Active Hospital Problems    Diagnosis     **Acute on chronic respiratory failure with hypoxia     HCAP (healthcare-associated pneumonia)     ESRD (end stage renal disease)     Aspiration pneumonitis     Peritoneal dialysis catheter in place     Essential hypertension     Anemia due to chronic kidney disease     Type 2 diabetes mellitus without complication      Impression:    Acute hypoxic respiratory failure requiring supplemental oxygen  Concern for aspiration pneumonia  NSTEMI type I versus type II  ESRD on peritoneal dialysis  Anemia of chronic kidney disease  T2DM with hyperglycemia  Hypomagnesemia       Plan:    -Currently on room air.  Continue maintain SpO2 greater than 90%  -Continue Unasyn for total 7 days in the setting of aspiration pneumonia  -Continue Brovana, Pulmicort DuParth  -Continue chest physiotherapy  -Continue bronchopulmonary hygiene.  Encourage I-S and flutter  -Continue to monitor renal panel and electrolytes.  Electrolytes as necessary  -Continue aspiration precautions.  Upright for all p.o. intake  -SLP following, appreciate  input  -Peritoneal dialysis per nephrology, appreciate input  -Continue delirium precautions and Seroquel for now.  Psychiatry to evaluate  -Follow-up pulmonary clinic 1 to 2 weeks after discharge     DVT prophylaxis:  Medical DVT prophylaxis orders are present.    CODE STATUS:   Level Of Support Discussed With: Patient  Code Status (Patient has no pulse and is not breathing): CPR (Attempt to Resuscitate)  Medical Interventions (Patient has pulse or is breathing): Full Support    I have reviewed labs, imaging, pertinent clinical data and provider notes.   I have discussed with bedside nurse and primary service.     Electronically signed by BECKY Rosenthal, 03/05/24, 10:43 AM EST.    Electronically signed by Preston Jimenez MD, 03/05/24, 6:59 AM EST.    This visit was performed by BOTH a physician and an APC. I personally evaluated and examined the patient. I performed all aspects of MDM as documented. , I have reviewed and confirmed the accuracy of the patient's history as documented in this note., and I have reexamined the patient and the results are consistent with the previously documented exam. I have updated the documentation as necessary.     Electronically signed by Preston Jimenez MD, 03/05/24, 3:04 PM EST.

## 2024-03-05 NOTE — PROGRESS NOTES
[facility]     Nephrology Progress Note      Patient Name: Nelson Wang  : 1945  MRN: [id number]  Primary Care Physician:  Janna Mo MD  Date of admission: 2024    Subjective   Subjective     Interval History:  Patient is somewhat somnolent and lethargic when I saw him but I did not try hard to wake him up.  Reportedly has been agitated and confused earlier and just fall asleep.  Somewhat short of breath but improved this afternoon.  Chest x-ray showing worsening infiltrates.    Review of Systems   Not obtained today.    Objective   Objective     Vitals:   Temp:  [97.7 °F (36.5 °C)-98.4 °F (36.9 °C)] 97.7 °F (36.5 °C)  Heart Rate:  [79-95] 95  Resp:  [10-20] 10  BP: (106-138)/(45-99) 106/63  Flow (L/min):  [4.5-6] 4.5  Physical Exam:   Constitutional: Sleeping.   Eyes: sclerae anicteric, no conjunctival injection   HENT: mucous membranes moist   Neck: Supple, no thyromegaly, no lymphadenopathy, trachea midline, full EJ   Respiratory: Decreased with scattered crackles bilaterally, nonlabored respirations    Cardiovascular: RRR, no murmurs, rubs, or gallops.   Gastrointestinal: Positive bowel sounds, soft, nontender, nondistended, PD catheter exit site is covered.   Musculoskeletal: No edema, no clubbing or cyanosis   Psychiatric: Not assessed.   Neurologic: Not assessed today.   Skin: warm and dry, no rashes     Result Review    Result Reviewed:  I have personally reviewed the results from the time of this admission to 3/4/2024 19:18 EST and agree with these findings:  [x]  Laboratory  []  Microbiology  [x]  Radiology  []  EKG/Telemetry   []  Cardiology/Vascular   []  Pathology  []  Old records  []  Other:        Lab 24  0543 24  0255 24  1158 24  0527 24  0403 24  2254   SODIUM  --  140 139 138 136 139   SODIUM, ARTERIAL 137.6  --   --   --   --   --    POTASSIUM  --  3.6 3.3* 3.5 3.9 4.1   CHLORIDE  --  102 102 102 100 99   CO2  --  25.3 24.2 26.0  25.1 28.7   BUN  --  34* 35* 35* 33* 22   CREATININE  --  5.93* 6.36* 7.02* 6.40* 6.04*   GLUCOSE  --  209* 151* 142* 170* 164*   GLUCOSE, ARTERIAL 140*  --   --   --   --   --    EGFR  --  9.1* 8.4* 7.4* 8.3* 8.9*   ANION GAP  --  12.7 12.8 10.0 10.9 11.3   MAGNESIUM  --  1.7  --  1.6  --   --    PHOSPHORUS  --  4.9*  --  5.3*  --   --            Most notable findings include: Reviewed labs.    Assessment & Plan   Assessment / Plan       Active Hospital Problems:  Active Hospital Problems    Diagnosis     **Acute on chronic respiratory failure with hypoxia     HCAP (healthcare-associated pneumonia)     ESRD (end stage renal disease)     Aspiration pneumonitis     Peritoneal dialysis catheter in place     Essential hypertension     Anemia due to chronic kidney disease     Type 2 diabetes mellitus without complication        Assessment and Plan:    - End-stage renal disease on peritoneal dialysis, with evidence of increased volume, transient hypoxia earlier today.  Will do 4.25% manual exchange today x 1 and used 2.5% dextrose solution for the night cycler.  I added torsemide 100 mg p.o. daily.  Discussed with daughter who is doing his dialysis at this time.  Continue gentamicin 0.1% cream daily application to PD catheter exit site.  Continue with nystatin swish and swallow for fungal peritonitis prevention while on antibiotics.  - Pneumonia, status post bronchoscopy, per pulmonary.  Antibiotics and steroids currently.  - Hypertension, blood pressure is acceptable.  - Anemia of chronic kidney disease, hemoglobin below target but no change today 8.8.  On long-acting RONALD as outpatient.  Monitor for now.    - Type 2 diabetes with complications.  -- Hypokalemia: Replaced and improved.  Recheck in AM.  Magnesium okay.   Discussed with primary and family.  Will follow.      Electronically signed by Coreen Castro MD, 3/4/2024, 19:18 EST.

## 2024-03-05 NOTE — PROGRESS NOTES
[facility]     Nephrology Progress Note      Patient Name: Nelson Wang  : 1945  MRN: [id number]  Primary Care Physician:  Janna Mo MD  Date of admission: 2024    Subjective   Subjective     Interval History:  PT awake and alert today.  Recognized me on entering room  Knows he is in hospital.    Reportedly has been agitated and confused earlier and just fall asleep.    Objective   Objective     Vitals:   Temp:  [97.7 °F (36.5 °C)-98.4 °F (36.9 °C)] 98.2 °F (36.8 °C)  Heart Rate:  [] 81  Resp:  [10-18] 18  BP: (106-139)/(40-91) 132/72  Flow (L/min):  [3-4.5] 3.5  Physical Exam:   Constitutional: Sleeping.   Eyes: sclerae anicteric, no conjunctival injection   HENT: mucous membranes moist   Neck: Supple, no thyromegaly, no lymphadenopathy, trachea midline, full EJ   Respiratory: Decreased with scattered crackles bilaterally, nonlabored respirations    Cardiovascular: RRR, no murmurs, rubs, or gallops.   Gastrointestinal: Positive bowel sounds, soft, nontender, nondistended, PD catheter exit site is covered.   Musculoskeletal: No edema, no clubbing or cyanosis   Psychiatric: Not assessed.   Neurologic: Not assessed today.   Skin: warm and dry, no rashes     Result Review    Result Reviewed:  I have personally reviewed the results from the time of this admission to 3/5/2024 10:58 EST and agree with these findings:  [x]  Laboratory  []  Microbiology  [x]  Radiology  []  EKG/Telemetry   []  Cardiology/Vascular   []  Pathology  []  Old records  []  Other:        Lab 24  0415 24  0543 24  0255 24  1158 24  0527 24  0403 24  4524   SODIUM 140  --  140 139 138 136 139   SODIUM, ARTERIAL  --  137.6  --   --   --   --   --    POTASSIUM 4.0  --  3.6 3.3* 3.5 3.9 4.1   CHLORIDE 101  --  102 102 102 100 99   CO2 23.6  --  25.3 24.2 26.0 25.1 28.7   BUN 32*  --  34* 35* 35* 33* 22   CREATININE 6.11*  --  5.93* 6.36* 7.02* 6.40* 6.04*   GLUCOSE 200*  --   209* 151* 142* 170* 164*   GLUCOSE, ARTERIAL  --  140*  --   --   --   --   --    EGFR 8.8*  --  9.1* 8.4* 7.4* 8.3* 8.9*   ANION GAP 15.4*  --  12.7 12.8 10.0 10.9 11.3   MAGNESIUM  --   --  1.7  --  1.6  --   --    PHOSPHORUS  --   --  4.9*  --  5.3*  --   --            Assessment & Plan   Assessment / Plan       Active Hospital Problems:  Active Hospital Problems    Diagnosis     **Acute on chronic respiratory failure with hypoxia     HCAP (healthcare-associated pneumonia)     ESRD (end stage renal disease)     Aspiration pneumonitis     Peritoneal dialysis catheter in place     Essential hypertension     Anemia due to chronic kidney disease     Type 2 diabetes mellitus without complication        Assessment and Plan:    - End-stage renal disease on peritoneal dialysis, with evidence of increased volume, transient hypoxia earlier today.  continue 2.5% dextrose solution for the night cycler.   Discussed with daughter who is doing his dialysis at this time.  Continue gentamicin 0.1% cream daily application to PD catheter exit site.  Continue with nystatin swish and swallow for fungal peritonitis prevention while on antibiotics.  - Pneumonia, status post bronchoscopy, per pulmonary.  Antibiotics and steroids currently.  - Hypertension, blood pressure is acceptable.  - Anemia of chronic kidney disease, hemoglobin below target but no change today 8.8.  On long-acting RONALD as outpatient.  Monitor for now.    - Type 2 diabetes with complications.  -- Hypokalemia: Replaced and improved.

## 2024-03-06 ENCOUNTER — APPOINTMENT (OUTPATIENT)
Dept: MRI IMAGING | Facility: HOSPITAL | Age: 79
DRG: 193 | End: 2024-03-06
Payer: MEDICARE

## 2024-03-06 LAB
ALBUMIN SERPL-MCNC: 2.3 G/DL (ref 3.5–5.2)
ALP SERPL-CCNC: 52 U/L (ref 39–117)
ALT SERPL W P-5'-P-CCNC: 13 U/L (ref 1–41)
ANION GAP SERPL CALCULATED.3IONS-SCNC: 11.3 MMOL/L (ref 5–15)
AST SERPL-CCNC: 24 U/L (ref 1–40)
BASOPHILS # BLD AUTO: 0.05 10*3/MM3 (ref 0–0.2)
BASOPHILS NFR BLD AUTO: 1.1 % (ref 0–1.5)
BILIRUB CONJ SERPL-MCNC: <0.2 MG/DL (ref 0–0.3)
BILIRUB INDIRECT SERPL-MCNC: ABNORMAL MG/DL
BILIRUB SERPL-MCNC: 0.4 MG/DL (ref 0–1.2)
BUN SERPL-MCNC: 35 MG/DL (ref 8–23)
BUN/CREAT SERPL: 5.1 (ref 7–25)
CALCIUM SPEC-SCNC: 8.1 MG/DL (ref 8.6–10.5)
CHLORIDE SERPL-SCNC: 104 MMOL/L (ref 98–107)
CMV DNA SPEC QL NAA+PROBE: NEGATIVE
CO2 SERPL-SCNC: 26.7 MMOL/L (ref 22–29)
CREAT SERPL-MCNC: 6.87 MG/DL (ref 0.76–1.27)
DEPRECATED RDW RBC AUTO: 59.5 FL (ref 37–54)
EGFRCR SERPLBLD CKD-EPI 2021: 7.6 ML/MIN/1.73
EOSINOPHIL # BLD AUTO: 0.17 10*3/MM3 (ref 0–0.4)
EOSINOPHIL NFR BLD AUTO: 3.8 % (ref 0.3–6.2)
ERYTHROCYTE [DISTWIDTH] IN BLOOD BY AUTOMATED COUNT: 16.8 % (ref 12.3–15.4)
GLUCOSE BLDC GLUCOMTR-MCNC: 147 MG/DL (ref 70–99)
GLUCOSE BLDC GLUCOMTR-MCNC: 149 MG/DL (ref 70–99)
GLUCOSE BLDC GLUCOMTR-MCNC: 154 MG/DL (ref 70–99)
GLUCOSE BLDC GLUCOMTR-MCNC: 177 MG/DL (ref 70–99)
GLUCOSE BLDC GLUCOMTR-MCNC: 193 MG/DL (ref 70–99)
GLUCOSE SERPL-MCNC: 196 MG/DL (ref 65–99)
HCT VFR BLD AUTO: 30.3 % (ref 37.5–51)
HGB BLD-MCNC: 9.1 G/DL (ref 13–17.7)
IMM GRANULOCYTES # BLD AUTO: 0.03 10*3/MM3 (ref 0–0.05)
IMM GRANULOCYTES NFR BLD AUTO: 0.7 % (ref 0–0.5)
LYMPHOCYTES # BLD AUTO: 1.71 10*3/MM3 (ref 0.7–3.1)
LYMPHOCYTES NFR BLD AUTO: 37.8 % (ref 19.6–45.3)
MAGNESIUM SERPL-MCNC: 1.5 MG/DL (ref 1.6–2.4)
MCH RBC QN AUTO: 29.4 PG (ref 26.6–33)
MCHC RBC AUTO-ENTMCNC: 30 G/DL (ref 31.5–35.7)
MCV RBC AUTO: 97.7 FL (ref 79–97)
MONOCYTES # BLD AUTO: 0.74 10*3/MM3 (ref 0.1–0.9)
MONOCYTES NFR BLD AUTO: 16.4 % (ref 5–12)
NEUTROPHILS NFR BLD AUTO: 1.82 10*3/MM3 (ref 1.7–7)
NEUTROPHILS NFR BLD AUTO: 40.2 % (ref 42.7–76)
NRBC BLD AUTO-RTO: 0 /100 WBC (ref 0–0.2)
PHOSPHATE SERPL-MCNC: 3.5 MG/DL (ref 2.5–4.5)
PLATELET # BLD AUTO: 172 10*3/MM3 (ref 140–450)
PMV BLD AUTO: 11.6 FL (ref 6–12)
POTASSIUM SERPL-SCNC: 3.2 MMOL/L (ref 3.5–5.2)
PROT SERPL-MCNC: 5.7 G/DL (ref 6–8.5)
RBC # BLD AUTO: 3.1 10*6/MM3 (ref 4.14–5.8)
SODIUM SERPL-SCNC: 142 MMOL/L (ref 136–145)
SPECIMEN SOURCE: NORMAL
T-UPTAKE NFR SERPL: 0.9 TBI (ref 0.8–1.3)
T4 SERPL-MCNC: 3.68 MCG/DL (ref 4.5–11.7)
TSH SERPL DL<=0.05 MIU/L-ACNC: 8.5 UIU/ML (ref 0.27–4.2)
WBC NRBC COR # BLD AUTO: 4.52 10*3/MM3 (ref 3.4–10.8)

## 2024-03-06 PROCEDURE — 63710000001 INSULIN REGULAR HUMAN PER 5 UNITS: Performed by: STUDENT IN AN ORGANIZED HEALTH CARE EDUCATION/TRAINING PROGRAM

## 2024-03-06 PROCEDURE — 25010000002 AMPICILLIN-SULBACTAM PER 1.5 G: Performed by: INTERNAL MEDICINE

## 2024-03-06 PROCEDURE — 84436 ASSAY OF TOTAL THYROXINE: CPT | Performed by: FAMILY MEDICINE

## 2024-03-06 PROCEDURE — 94799 UNLISTED PULMONARY SVC/PX: CPT

## 2024-03-06 PROCEDURE — 85025 COMPLETE CBC W/AUTO DIFF WBC: CPT | Performed by: FAMILY MEDICINE

## 2024-03-06 PROCEDURE — 94669 MECHANICAL CHEST WALL OSCILL: CPT

## 2024-03-06 PROCEDURE — 25010000002 HEPARIN (PORCINE) PER 1000 UNITS: Performed by: INTERNAL MEDICINE

## 2024-03-06 PROCEDURE — 80076 HEPATIC FUNCTION PANEL: CPT | Performed by: FAMILY MEDICINE

## 2024-03-06 PROCEDURE — 82948 REAGENT STRIP/BLOOD GLUCOSE: CPT | Performed by: STUDENT IN AN ORGANIZED HEALTH CARE EDUCATION/TRAINING PROGRAM

## 2024-03-06 PROCEDURE — 83735 ASSAY OF MAGNESIUM: CPT | Performed by: FAMILY MEDICINE

## 2024-03-06 PROCEDURE — 25010000002 MAGNESIUM SULFATE 2 GM/50ML SOLUTION: Performed by: FAMILY MEDICINE

## 2024-03-06 PROCEDURE — 84479 ASSAY OF THYROID (T3 OR T4): CPT | Performed by: FAMILY MEDICINE

## 2024-03-06 PROCEDURE — 84100 ASSAY OF PHOSPHORUS: CPT | Performed by: FAMILY MEDICINE

## 2024-03-06 PROCEDURE — 63710000001 INSULIN REGULAR HUMAN PER 5 UNITS: Performed by: FAMILY MEDICINE

## 2024-03-06 PROCEDURE — 80048 BASIC METABOLIC PNL TOTAL CA: CPT | Performed by: FAMILY MEDICINE

## 2024-03-06 PROCEDURE — 84443 ASSAY THYROID STIM HORMONE: CPT | Performed by: FAMILY MEDICINE

## 2024-03-06 PROCEDURE — 99233 SBSQ HOSP IP/OBS HIGH 50: CPT | Performed by: INTERNAL MEDICINE

## 2024-03-06 PROCEDURE — 82948 REAGENT STRIP/BLOOD GLUCOSE: CPT

## 2024-03-06 PROCEDURE — 94664 DEMO&/EVAL PT USE INHALER: CPT

## 2024-03-06 RX ORDER — LORAZEPAM 0.5 MG/1
0.25 TABLET ORAL ONCE AS NEEDED
Status: COMPLETED | OUTPATIENT
Start: 2024-03-06 | End: 2024-03-06

## 2024-03-06 RX ORDER — ARIPIPRAZOLE 2 MG/1
2 TABLET ORAL DAILY
Status: DISCONTINUED | OUTPATIENT
Start: 2024-03-06 | End: 2024-03-07

## 2024-03-06 RX ORDER — PANTOPRAZOLE SODIUM 40 MG/10ML
40 INJECTION, POWDER, LYOPHILIZED, FOR SOLUTION INTRAVENOUS
Status: DISCONTINUED | OUTPATIENT
Start: 2024-03-06 | End: 2024-03-07

## 2024-03-06 RX ORDER — LEVOTHYROXINE SODIUM 0.15 MG/1
150 TABLET ORAL
Status: DISCONTINUED | OUTPATIENT
Start: 2024-03-07 | End: 2024-03-09 | Stop reason: HOSPADM

## 2024-03-06 RX ORDER — TEMAZEPAM 15 MG/1
15 CAPSULE ORAL NIGHTLY PRN
Status: DISCONTINUED | OUTPATIENT
Start: 2024-03-06 | End: 2024-03-06

## 2024-03-06 RX ORDER — LORAZEPAM 0.5 MG/1
0.25 TABLET ORAL NIGHTLY PRN
Status: DISCONTINUED | OUTPATIENT
Start: 2024-03-06 | End: 2024-03-09 | Stop reason: HOSPADM

## 2024-03-06 RX ORDER — SEVELAMER CARBONATE 800 MG/1
800 TABLET, FILM COATED ORAL 2 TIMES DAILY WITH MEALS
Status: DISCONTINUED | OUTPATIENT
Start: 2024-03-06 | End: 2024-03-09 | Stop reason: HOSPADM

## 2024-03-06 RX ORDER — POTASSIUM CHLORIDE 1.5 G/1.58G
20 POWDER, FOR SOLUTION ORAL 2 TIMES DAILY
Status: COMPLETED | OUTPATIENT
Start: 2024-03-06 | End: 2024-03-06

## 2024-03-06 RX ORDER — MAGNESIUM SULFATE HEPTAHYDRATE 40 MG/ML
2 INJECTION, SOLUTION INTRAVENOUS ONCE
Status: COMPLETED | OUTPATIENT
Start: 2024-03-06 | End: 2024-03-06

## 2024-03-06 RX ADMIN — INSULIN HUMAN 2 UNITS: 100 INJECTION, SOLUTION PARENTERAL at 05:57

## 2024-03-06 RX ADMIN — Medication 10 ML: at 09:18

## 2024-03-06 RX ADMIN — HEPARIN SODIUM 5000 UNITS: 5000 INJECTION INTRAVENOUS; SUBCUTANEOUS at 14:01

## 2024-03-06 RX ADMIN — NYSTATIN 500000 UNITS: 100000 SUSPENSION ORAL at 17:19

## 2024-03-06 RX ADMIN — HYDROCODONE POLISTIREX AND CHLORPHENIRAMINE POLISTIREX 2.5 ML: 10; 8 SUSPENSION, EXTENDED RELEASE ORAL at 21:52

## 2024-03-06 RX ADMIN — INSULIN HUMAN 2 UNITS: 100 INJECTION, SOLUTION PARENTERAL at 12:03

## 2024-03-06 RX ADMIN — ASPIRIN 81 MG: 81 TABLET, COATED ORAL at 09:16

## 2024-03-06 RX ADMIN — ARFORMOTEROL TARTRATE 15 MCG: 15 SOLUTION RESPIRATORY (INHALATION) at 08:33

## 2024-03-06 RX ADMIN — SODIUM CHLORIDE 40 ML: 9 INJECTION, SOLUTION INTRAVENOUS at 09:15

## 2024-03-06 RX ADMIN — SODIUM CHLORIDE, SODIUM LACTATE, CALCIUM CHLORIDE, MAGNESIUM CHLORIDE AND DEXTROSE 6000 ML: 1.5; 538; 448; 18.3; 5.08 INJECTION, SOLUTION INTRAPERITONEAL at 01:11

## 2024-03-06 RX ADMIN — LORAZEPAM 0.25 MG: 0.5 TABLET ORAL at 20:36

## 2024-03-06 RX ADMIN — BUDESONIDE 0.5 MG: 0.5 INHALANT RESPIRATORY (INHALATION) at 08:33

## 2024-03-06 RX ADMIN — HYDROCODONE POLISTIREX AND CHLORPHENIRAMINE POLISTIREX 2.5 ML: 10; 8 SUSPENSION, EXTENDED RELEASE ORAL at 03:44

## 2024-03-06 RX ADMIN — NYSTATIN 500000 UNITS: 100000 SUSPENSION ORAL at 20:39

## 2024-03-06 RX ADMIN — Medication 250 MG: at 09:16

## 2024-03-06 RX ADMIN — Medication 10 ML: at 20:36

## 2024-03-06 RX ADMIN — POTASSIUM CHLORIDE 20 MEQ: 1.5 POWDER, FOR SOLUTION ORAL at 09:16

## 2024-03-06 RX ADMIN — LORAZEPAM 0.25 MG: 0.5 TABLET ORAL at 00:48

## 2024-03-06 RX ADMIN — SEVELAMER CARBONATE 800 MG: 800 TABLET, FILM COATED ORAL at 17:19

## 2024-03-06 RX ADMIN — SODIUM CHLORIDE, SODIUM LACTATE, CALCIUM CHLORIDE, MAGNESIUM CHLORIDE AND DEXTROSE 6000 ML: 1.5; 538; 448; 18.3; 5.08 INJECTION, SOLUTION INTRAPERITONEAL at 20:37

## 2024-03-06 RX ADMIN — BENZONATATE 100 MG: 100 CAPSULE ORAL at 12:03

## 2024-03-06 RX ADMIN — HEPARIN SODIUM 5000 UNITS: 5000 INJECTION INTRAVENOUS; SUBCUTANEOUS at 05:27

## 2024-03-06 RX ADMIN — PANTOPRAZOLE SODIUM 40 MG: 40 INJECTION, POWDER, FOR SOLUTION INTRAVENOUS at 05:27

## 2024-03-06 RX ADMIN — TORSEMIDE 100 MG: 20 TABLET ORAL at 09:16

## 2024-03-06 RX ADMIN — NYSTATIN 500000 UNITS: 100000 SUSPENSION ORAL at 09:16

## 2024-03-06 RX ADMIN — MAGNESIUM SULFATE HEPTAHYDRATE 2 G: 2 INJECTION, SOLUTION INTRAVENOUS at 09:15

## 2024-03-06 RX ADMIN — BUDESONIDE 0.5 MG: 0.5 INHALANT RESPIRATORY (INHALATION) at 19:19

## 2024-03-06 RX ADMIN — ATORVASTATIN CALCIUM 40 MG: 40 TABLET, FILM COATED ORAL at 09:16

## 2024-03-06 RX ADMIN — Medication 5 MG: at 20:36

## 2024-03-06 RX ADMIN — AMPICILLIN AND SULBACTAM 3 G: 2; 1 INJECTION, POWDER, FOR SOLUTION INTRAVENOUS at 09:15

## 2024-03-06 RX ADMIN — ARFORMOTEROL TARTRATE 15 MCG: 15 SOLUTION RESPIRATORY (INHALATION) at 19:19

## 2024-03-06 RX ADMIN — POTASSIUM CHLORIDE 20 MEQ: 1.5 POWDER, FOR SOLUTION ORAL at 20:36

## 2024-03-06 RX ADMIN — HEPARIN SODIUM 5000 UNITS: 5000 INJECTION INTRAVENOUS; SUBCUTANEOUS at 20:36

## 2024-03-06 RX ADMIN — SEVELAMER CARBONATE 800 MG: 800 TABLET, FILM COATED ORAL at 09:16

## 2024-03-06 RX ADMIN — INSULIN HUMAN 2 UNITS: 100 INJECTION, SOLUTION PARENTERAL at 20:37

## 2024-03-06 RX ADMIN — GENTAMICIN SULFATE 1 APPLICATION: 1 CREAM TOPICAL at 09:16

## 2024-03-06 RX ADMIN — ARIPIPRAZOLE 2 MG: 2 TABLET ORAL at 17:19

## 2024-03-06 RX ADMIN — Medication 250 MG: at 20:36

## 2024-03-06 RX ADMIN — NYSTATIN 500000 UNITS: 100000 SUSPENSION ORAL at 12:03

## 2024-03-06 NOTE — PROGRESS NOTES
[facility]   Hospitalist Progress Note  Date: 3/5/2024  Patient Name: Nelson Wang  : 1945  MRN: [id number]  Date of admission: 2024      Subjective   Subjective     Chief complaint: Shortness of breath    Summary:  79-year-old male with history of diabetes, ESRD on PD, psoriasis, on monoclonal antibody therapy, immunosuppression secondary to Skyrizi, hypertension, dyslipidemia, recent C. difficile infection, hearing impairment, CAD, vertigo, GERD without esophagitis, recent E. coli bacteremia with septic shock, C. difficile colitis, peritonitis, pneumonia, discharged with PICC line, to complete course of antibiotics, was arrived back into the hospital with worsening shortness of breath, volume overloaded, worsening progression of multifocal pneumonia, bilateral pleural effusions, pulmonary consulted, nephrology consulted, bronchoscopy performed, mucous plugging removed, increasing confused and agitated, required sedating medications to be initiated, psychiatry consulted, patient finally rested after several days, did have some decompensation in breathing, pulmonary following    Interval follow-up: Seen and examined this morning, no acute distress, no acute major night events, appears to be much better with respect to his mentation, more alert and awake and oriented, did sleep better, safety sitter remains at the bedside with intermittent episodes of confusion, no nausea or vomiting, diet restarted with speech therapy evaluation, he was able to cooperate during exam.  Telemetry reviewed, no acute major events, sinus rhythm with intermittent tachycardia up to the 100s.  On 2 L nasal cannula, sats in the 90+ range, yesterday pulmonary was considering a thoracentesis with pleural effusions, but not significant for thoracentesis to be performed.  White blood cell count 4000, hemoglobin 10.2, creatinine 6.11, BUN 32, bicarb 23, potassium 4.0.  CT head I ordered today to further investigate his altered  mental status shows atrophy and changes of chronic microvascular ischemic disease, nothing acute.  He appears euvolemic.    Review of systems:  All systems reviewed and negative except for weakness, fatigue, intermittent confusion    Objective   Objective     Vitals:   Temp:  [98.2 °F (36.8 °C)-98.4 °F (36.9 °C)] 98.4 °F (36.9 °C)  Heart Rate:  [] 81  Resp:  [14-18] 18  BP: (110-139)/(40-91) 110/66  Flow (L/min):  [2-4] 2  Physical Exam    Constitutional: Awake, alert, no acute distress laying in bed   Eyes: Pupils equal, sclerae anicteric, no conjunctival injection   HENT: NCAT, mucous membranes moist   Neck: Supple, full range of motion  Respiratory: Diminished coarse breath sounds with no rhonchi or wheezing   Cardiovascular: RRR, no murmurs, rubs, or gallops, palpable pedal pulses bilaterally   Gastrointestinal: Positive bowel sounds, soft, nontender, distended, PD catheter in place    Musculoskeletal: No bilateral ankle edema, no clubbing or cyanosis to extremities   Psychiatric: Appropriate affect, cooperative   Neurologic: Oriented x x 2 person and place, strength symmetric in all extremities, Cranial Nerves grossly intact to confrontation, speech clear   Skin: No rashes visible on exposed skin; rectal exam deferred    Result Review    Result Review:  I have personally reviewed the pertinent results from the past 24 hours to 3/5/2024 19:02 EST and agree with these findings:  [x]  Laboratory   CBC          3/3/2024    11:58 3/4/2024    02:55 3/5/2024    04:15   CBC   WBC 6.62  6.13  4.23    RBC 2.93  3.22  3.46    Hemoglobin 8.8  9.4  10.2    Hematocrit 28.4  31.3  35.3    MCV 96.9  97.2  102.0    MCH 30.0  29.2  29.5    MCHC 31.0  30.0  28.9    RDW 17.2  17.2  17.3    Platelets 107  117  141      BMP          3/3/2024    11:58 3/4/2024    02:55 3/4/2024    05:43 3/5/2024    04:15   BMP   BUN 35  34   32    Creatinine 6.36  5.93   6.11    Sodium 139  140  137.6  140    Potassium 3.3  3.6   4.0     Chloride 102  102   101    CO2 24.2  25.3   23.6    Calcium 7.7  8.2   8.2      LIVER FUNCTION TESTS:      Lab 02/28/24  2254   TOTAL PROTEIN 5.9*   ALBUMIN 2.6*   GLOBULIN 3.3   ALT (SGPT) 13   AST (SGOT) 34   BILIRUBIN 0.3   ALK PHOS 65       [x]  Microbiology   Microbiology Results (last 10 days)       Procedure Component Value - Date/Time    Respiratory Culture - Wash, Bronchus [[id number]] Collected: 03/01/24 0932    Lab Status: Final result Specimen: Wash from Bronchus Updated: 03/03/24 1133     Respiratory Culture Moderate growth (3+) Normal respiratory lashae. No S. aureus or Pseudomonas aeruginosa detected. Final report.     Gram Stain Few (2+) Gram positive cocci in pairs and chains      Few (2+) WBCs seen    Virus Culture - Lavage, Lung, Right Lower Lobe [[id number]] Collected: 03/01/24 0922    Lab Status: Preliminary result Specimen: Lavage from Lung, Right Lower Lobe Updated: 03/05/24 0406     Viral Culture, General Comment     Comment: Preliminary Report:  No virus isolated at 48 hours. Next report to follow after 4 days.       Narrative:      Performed at:  [address] [place] [address]  [id number]  : Maricarmen Mensah MD, Phone:  [phone]    AFB Culture - Lavage, Lung, Right Lower Lobe [[id number]] Collected: 03/01/24 0922    Lab Status: Preliminary result Specimen: Lavage from Lung, Right Lower Lobe Updated: 03/04/24 1506     AFB Stain No acid fast bacilli seen on direct smear      No acid fast bacilli seen on concentrated smear    BAL Culture, Quantitative - Lavage, Lung, Right Lower Lobe [[id number]] Collected: 03/01/24 0922    Lab Status: Final result Specimen: Lavage from Lung, Right Lower Lobe Updated: 03/03/24 1132     BAL Culture 50,000 CFU/mL Normal respiratory lashae. No S. aureus or Pseudomonas aeruginosa detected. Final report.     Gram Stain Few (2+) Gram positive cocci in pairs and chains      Rare (1+) WBCs seen    Pneumonia Panel - Lavage, Lung, Right  Lower Lobe [[id number]]  (Normal) Collected: 03/01/24 0922    Lab Status: Final result Specimen: Lavage from Lung, Right Lower Lobe Updated: 03/01/24 1145     Escherichia coli PCR Not Detected     Acinetobacter calcoaceticus-baumannii complex PCR Not Detected     Enterobacter cloacae PCR Not Detected     Klebsiella oxytoca PCR Not Detected     Klebsiella pneumoniae group PCR Not Detected     Klebsiella aerogenes PCR Not Detected     Moraxella catarrhalis PCR Not Detected     Proteus species PCR Not Detected     Pseudomonas aeroginosa PCR Not Detected     Serratia marcescens PCR Not Detected     Staphylococcus aureus PCR Not Detected     Streptococcus pyogenes PCR Not Detected     Haemophilus influenzae PCR Not Detected     Streptococcus agalactiae PCR Not Detected     Streptococcus pneumoniae PCR Not Detected     Chlamydophila pneumoniae PCR Not Detected     Legionella pneumophilia PCR Not Detected     Mycoplasma pneumo by PCR Not Detected     ADENOVIRUS, PCR Not Detected     CTX-M Gene N/A     IMP Gene N/A     KPC Gene N/A     mecA/C and MREJ Gene N/A     NDM Gene N/A     OXA-48-like Gene N/A     VIM Gene N/A     Coronavirus Not Detected     Human Metapneumovirus Not Detected     Human Rhinovirus/Enterovirus Not Detected     Influenza A PCR Not Detected     Influenza B PCR Not Detected     RSV, PCR Not Detected     Parainfluenza virus PCR Not Detected    Respiratory Panel PCR w/COVID-19(SARS-CoV-2) RA/TANIA/GAVIN/PAD/COR/NENA In-House, NP Swab in UTM/VTM, 2 HR TAT - Swab, Nasopharynx [[id number]]  (Normal) Collected: 02/29/24 0959    Lab Status: Final result Specimen: Swab from Nasopharynx Updated: 02/29/24 1131     ADENOVIRUS, PCR Not Detected     Coronavirus 229E Not Detected     Coronavirus HKU1 Not Detected     Coronavirus NL63 Not Detected     Coronavirus OC43 Not Detected     COVID19 Not Detected     Human Metapneumovirus Not Detected     Human Rhinovirus/Enterovirus Not Detected     Influenza A PCR Not Detected      Influenza B PCR Not Detected     Parainfluenza Virus 1 Not Detected     Parainfluenza Virus 2 Not Detected     Parainfluenza Virus 3 Not Detected     Parainfluenza Virus 4 Not Detected     RSV, PCR Not Detected     Bordetella pertussis pcr Not Detected     Bordetella parapertussis PCR Not Detected     Chlamydophila pneumoniae PCR Not Detected     Mycoplasma pneumo by PCR Not Detected    Narrative:      In the setting of a positive respiratory panel with a viral infection PLUS a negative procalcitonin without other underlying concern for bacterial infection, consider observing off antibiotics or discontinuation of antibiotics and continue supportive care. If the respiratory panel is positive for atypical bacterial infection (Bordetella pertussis, Chlamydophila pneumoniae, or Mycoplasma pneumoniae), consider antibiotic de-escalation to target atypical bacterial infection.    MRSA Screen, PCR (Inpatient) - Swab, Nares [[id number]]  (Normal) Collected: 02/29/24 0557    Lab Status: Final result Specimen: Swab from Nares Updated: 02/29/24 0826     MRSA PCR No MRSA Detected    Narrative:      The negative predictive value of this diagnostic test is high and should only be used to consider de-escalating anti-MRSA therapy. A positive result may indicate colonization with MRSA and must be correlated clinically.    COVID-19, FLU A/B, RSV PCR 1 HR TAT - Swab, Nasopharynx [[id number]]  (Normal) Collected: 02/28/24 2201    Lab Status: Final result Specimen: Swab from Nasopharynx Updated: 02/28/24 2244     COVID19 Not Detected     Influenza A PCR Not Detected     Influenza B PCR Not Detected     RSV, PCR Not Detected    Narrative:      Fact sheet for providers: https://www.fda.gov/media/420205/download    Fact sheet for patients: https://www.fda.gov/media/208626/download    Test performed by PCR.    Blood Culture - Blood, Hand, Left [[id number]]  (Normal) Collected: 02/28/24 2150    Lab Status: Final result Specimen: Blood from  Hand, Left Updated: 03/04/24 2200     Blood Culture No growth at 5 days    Blood Culture - Blood, Hand, Left [[id number]]  (Normal) Collected: 02/28/24 2150    Lab Status: Final result Specimen: Blood from Hand, Left Updated: 03/04/24 2200     Blood Culture No growth at 5 days              [x]  Radiology CT Head Without Contrast    Result Date: 3/5/2024    1. Atrophy and changes of chronic microvascular ischemic disease, but no acute intracranial pathology.     EMILY SOTO MD       Electronically Signed and Approved By: EMILY SOTO MD on 3/05/2024 at 16:02             XR Chest 1 View    Result Date: 3/4/2024   Worsening of bilateral infiltrates with worsening of a right pleural effusion.       ERIC ELLISON MD       Electronically Signed and Approved By: ERIC ELLISON MD on 3/04/2024 at 6:12             CT Chest Without Contrast Diagnostic    Result Date: 2/29/2024   Worsening progression is suggested by CT increased bilateral pulmonary consolidation, much greater on the right than the left.  The findings suggest infectious multifocal pneumonia.  Aspiration pneumonia be excluded (especially in the right lower lobe).  New or increased pleural effusions are seen (small in volume).  Gallstones are present without definite acute cholecystitis.  Please see above comments for further detail.     Please note that portions of this note were completed with a voice recognition program.  АНДРЕЙ DE LUNA JR, MD       Electronically Signed and Approved By: АНДРЕЙ DE LUNA JR, MD on 2/29/2024 at 0:10              XR Chest 1 View    Result Date: 2/28/2024   Improving right basilar density compatible with persistent but resolving pneumonia.  The right IJ central venous catheter has been removed.  No new findings.       RENITA BARRAZA DO       Electronically Signed and Approved By: RENITA BARRAZA DO on 2/28/2024 at 22:19                [x]  EKG/Telemetry   ECG 12 Lead ED Triage Standing Order; SOA   Final Result   HEART RATE= 83   bpm   RR Interval= 724  ms   GA Interval= 224  ms   P Horizontal Axis= -16  deg   P Front Axis= 24  deg   QRSD Interval= 94  ms   QT Interval= 426  ms   QTcB= 501  ms   QRS Axis= 3  deg   T Wave Axis= 9  deg   - ABNORMAL ECG -   Sinus rhythm   Prolonged GA interval   Low voltage, extremity and precordial leads   Nonspecific T abnrm, anterolateral leads   Minimal ST elevation, inferior leads   Prolonged QT interval   When compared with ECG of 22-Jul-2023 18:04:19,   Significant rate increase   Electronically Signed By: Bradley Hernandez ([facility]) 29-Feb-2024 14:20:52   Date and Time of Study: 2024-02-28 21:58:46          [x]  Cardiology/Vascular   []  Pathology  [x]  Old records  []  Other:    Assessment & Plan   Assessment / Plan     Assessment/Plan:  Assessment:  Acute on chronic hypoxic respiratory failure  Concern for worsening multifocal pneumonia versus aspiration pneumonia  Mucous plugging of the airway  Right pleural effusion  Elevated troponin unsure if type I or type II NSTEMI  End-stage renal disease on peritoneal dialysis  Diabetes mellitus  Hypertension  Hypothyroidism  Recent E. coli bacteremia  Recent C. difficile colitis  Delirium versus acute toxic encephalopathy due to poor clearance of medication in the setting of ESRD    Plan:  Labs and imaging reviewed  Stop Seroquel  Monitor mentation overnight  CT head appreciated, reviewed findings  Continue safety sitter  Continue Unasyn  Continue atorvastatin 40 mg daily  Continue aspirin 81 mg daily  Continue Pulmicort and Brovana nebs twice daily  Peritoneal dialysis per nephrology  Discussed at length with pulmonary Dr. Jimenez, recommendations appreciated  Continue torsemide 100 mg daily  Continue levothyroxine 125 mcg daily  Will check TSH and free T4 in the morning with lab work    Continue telemetry monitor  Diet to be resumed  A.m. labs  Full code  DVT prophylaxis with heparin  Clinical course dictate further management  Discussed with nurse at the  bedside  Discussed with patient's daughter      DVT prophylaxis:  Medical DVT prophylaxis orders are present.        CODE STATUS:   Level Of Support Discussed With: Patient  Code Status (Patient has no pulse and is not breathing): CPR (Attempt to Resuscitate)  Medical Interventions (Patient has pulse or is breathing): Full Support        Electronically signed by Saúl Vargas MD, 03/05/24, 7:02 PM EST.    Portions of this documentation were transcribed electronically from a voice recognition software.  I confirm all data accurately represents the service(s) I performed at today's visit.

## 2024-03-06 NOTE — PLAN OF CARE
Goal Outcome Evaluation:              Outcome Evaluation: aox3-4 with intermittent confusion and forgetfulness. pleasant patient. able to rest some throughout the shift. safety watch continues at bedside. fall risk measures in place. unable to perform MRI due to failing screening form, attending MD aware. weaned to 2L nc, continous pulse ox in place. medicated for dry cough. no c/o pain or discomfort.

## 2024-03-06 NOTE — PROGRESS NOTES
" [facility]     Psychiatric Progress Note    Patient Name: Nelson Wang  : 1945  MRN: [id number]  Primary Care Physician:  Janna Mo MD  Date of admission: 2024    Subjective   Subjective     Patient seen and chart reviewed, discussed with staff.    Chief Complaint: Pneumonia, history of bipolar disorder      HPI:     Patient reports he has had some irritability.  He had some poor sleeping.  States that he has had some racing thoughts.  He also describes poor concentration.  Has a long history of bipolar disorder.    He is calm and cooperative today.  He has no acute agitation today.  Has had some irritability and restlessness recently.    Patient family reported 1 mg of lorazepam the other day and it was too much he was groggy for the entire next day.    Patient feels that he could benefit from aripiprazole for mood stabilization and an as needed lorazepam for insomnia.    Has some confusion in the mornings.        Objective   Objective     Vitals:   Temp:  [98.2 °F (36.8 °C)-98.6 °F (37 °C)] 98.6 °F (37 °C)  Heart Rate:  [82-98] 87  Resp:  [18-20] 18  BP: ()/(34-73) 102/44  Flow (L/min):  [1-2] 1          Mental Status Exam:      Appearance:   Sitting up in chair at bedside, calm, engaging  Reliability:   Good  Eye Contact:   Good  Concentration/Focus:    Attentive to the interview  Behaviors:    No restlessness or agitation  Memory :    Sensorium intact  Speech:    Normal rate and volume  Language:   Appropriate, relevant  Mood :    \"Okay\"  Affect:    Euthymic  Thought process:    Goal directed  Thought Content:    Denies suicidal or homicidal ideation, no hallucinations  Insight:   Good  Judgement:    Intact now, but has been irritable and restless at times      Result Review    Result Review:  I have personally reviewed the results from the time of this admission to 3/6/2024 15:40 EST and agree with these findings:  []  Laboratory  []  Microbiology  []  Radiology  []  " EKG/Telemetry   []  Cardiology/Vascular   []  Pathology  []  Old records  []  Other:  Most notable findings include:     Lab Results (last 24 hours)       Procedure Component Value Units Date/Time    POC Glucose 4x Daily Before Meals & at Bedtime [[id number]]  (Abnormal) Collected: 03/06/24 1118    Specimen: Blood Updated: 03/06/24 1120     Glucose 154 mg/dL      Comment: Serial Number: [id number]:  [id number]       Thyroid Panel With TSH [[id number]]  (Abnormal) Collected: 03/06/24 0522    Specimen: Blood from Hand, Right Updated: 03/06/24 1020     TSH 8.500 uIU/mL      T Uptake 0.90 TBI      T4, Total 3.68 mcg/dL      Comment: T4 results may be falsely increased if patient taking Biotin.       Fungus Culture - Lavage, Lung, Right Lower Lobe [[id number]] Collected: 03/01/24 0922    Specimen: Lavage from Lung, Right Lower Lobe Updated: 03/06/24 1015     Fungus Culture No fungus isolated at less than 1 week    AFB Culture - Lavage, Lung, Right Lower Lobe [[id number]] Collected: 03/01/24 0922    Specimen: Lavage from Lung, Right Lower Lobe Updated: 03/06/24 1015     AFB Culture No AFB isolated at less than 1 week     AFB Stain No acid fast bacilli seen on direct smear      No acid fast bacilli seen on concentrated smear    POC Glucose Once [[id number]]  (Abnormal) Collected: 03/06/24 0922    Specimen: Blood Updated: 03/06/24 0925     Glucose 149 mg/dL      Comment: Serial Number: [id number]:  [id number]       Phosphorus [[id number]]  (Normal) Collected: 03/06/24 0522    Specimen: Blood from Hand, Right Updated: 03/06/24 0700     Phosphorus 3.5 mg/dL     Basic Metabolic Panel [[id number]]  (Abnormal) Collected: 03/06/24 0522    Specimen: Blood from Hand, Right Updated: 03/06/24 0655     Glucose 196 mg/dL      BUN 35 mg/dL      Creatinine 6.87 mg/dL      Sodium 142 mmol/L      Potassium 3.2 mmol/L      Chloride 104 mmol/L      CO2 26.7 mmol/L      Calcium 8.1 mg/dL      BUN/Creatinine Ratio 5.1     Anion Gap 11.3  mmol/L      eGFR 7.6 mL/min/1.73      Comment: <15 Indicative of kidney failure       Narrative:      GFR Normal >60  Chronic Kidney Disease <60  Kidney Failure <15    The GFR formula is only valid for adults with stable renal function between ages 18 and 70.    Magnesium [[id number]]  (Abnormal) Collected: 03/06/24 0522    Specimen: Blood from Hand, Right Updated: 03/06/24 0655     Magnesium 1.5 mg/dL     Hepatic Function Panel [[id number]]  (Abnormal) Collected: 03/06/24 0522    Specimen: Blood from Hand, Right Updated: 03/06/24 0654     Total Protein 5.7 g/dL      Albumin 2.3 g/dL      ALT (SGPT) 13 U/L      AST (SGOT) 24 U/L      Alkaline Phosphatase 52 U/L      Total Bilirubin 0.4 mg/dL      Bilirubin, Direct <0.2 mg/dL      Bilirubin, Indirect --     Comment: Unable to calculate       CBC & Differential [[id number]]  (Abnormal) Collected: 03/06/24 0522    Specimen: Blood from Hand, Right Updated: 03/06/24 0646    Narrative:      The following orders were created for panel order CBC & Differential.  Procedure                               Abnormality         Status                     ---------                               -----------         ------                     CBC Auto Differential[[id number]]        Abnormal            Final result                 Please view results for these tests on the individual orders.    CBC Auto Differential [[id number]]  (Abnormal) Collected: 03/06/24 0522    Specimen: Blood from Hand, Right Updated: 03/06/24 0646     WBC 4.52 10*3/mm3      RBC 3.10 10*6/mm3      Hemoglobin 9.1 g/dL      Hematocrit 30.3 %      MCV 97.7 fL      MCH 29.4 pg      MCHC 30.0 g/dL      RDW 16.8 %      RDW-SD 59.5 fl      MPV 11.6 fL      Platelets 172 10*3/mm3      Neutrophil % 40.2 %      Lymphocyte % 37.8 %      Monocyte % 16.4 %      Eosinophil % 3.8 %      Basophil % 1.1 %      Immature Grans % 0.7 %      Neutrophils, Absolute 1.82 10*3/mm3      Lymphocytes, Absolute 1.71 10*3/mm3      Monocytes,  Absolute 0.74 10*3/mm3      Eosinophils, Absolute 0.17 10*3/mm3      Basophils, Absolute 0.05 10*3/mm3      Immature Grans, Absolute 0.03 10*3/mm3      nRBC 0.0 /100 WBC     POC Glucose Once [[id number]]  (Abnormal) Collected: 03/06/24 0533    Specimen: Blood Updated: 03/06/24 0537     Glucose 193 mg/dL      Comment: Serial Number: [id number]:  [id number]       POC Glucose Once [[id number]]  (Abnormal) Collected: 03/05/24 2305    Specimen: Blood Updated: 03/05/24 2310     Glucose 149 mg/dL      Comment: Serial Number: [id number]:  [id number]       POC Glucose Once [[id number]]  (Abnormal) Collected: 03/05/24 1741    Specimen: Blood Updated: 03/05/24 1831     Glucose 132 mg/dL      Comment: Serial Number: [id number]:  [id number]                   Medications:   !Patient Home Medications Stored on Unit, , Does not apply, BID  ampicillin-sulbactam, 3 g, Intravenous, Q24H  arformoterol, 15 mcg, Nebulization, BID - RT  aspirin, 81 mg, Oral, Daily  atorvastatin, 40 mg, Oral, Daily  budesonide, 0.5 mg, Nebulization, BID - RT  gentamicin, 1 Application, Topical, Daily  heparin (porcine), 5,000 Units, Subcutaneous, Q8H  dianeal lo-peter 1.5%, 6,000 mL, Intraperitoneal, Daily  insulin regular, 2-9 Units, Subcutaneous, 4x Daily AC & at Bedtime  [START ON 3/7/2024] levothyroxine, 150 mcg, Oral, Q AM  melatonin, 5 mg, Oral, Nightly  nystatin, 5 mL, Swish & Swallow, 4x Daily  pantoprazole, 40 mg, Intravenous, Q AM  potassium chloride, 20 mEq, Oral, BID  saccharomyces boulardii, 250 mg, Oral, BID  sevelamer, 800 mg, Oral, BID With Meals  sodium chloride, 10 mL, Intravenous, Q12H  torsemide, 100 mg, Oral, Daily          Assessment / Plan       Active Hospital Problems:  Active Hospital Problems    Diagnosis     **Acute on chronic respiratory failure with hypoxia     HCAP (healthcare-associated pneumonia)     ESRD (end stage renal disease)     Aspiration pneumonitis     Peritoneal dialysis catheter in place      Essential hypertension     Anemia due to chronic kidney disease     Type 2 diabetes mellitus without complication        Plan:     Add aripiprazole 2 mg daily  Continue lorazepam 0.5 mg as needed for insomnia  Discontinue Restoril  We will follow make treatment recommendations as indicated      Disposition:  I expect patient to be discharged .    Part of this note may be an electronic transcription/translation of spoken language to printed text using the Dragon dictation system.         Electronically signed by Emeka Vines MD, 03/06/24, 3:40 PM EST.

## 2024-03-06 NOTE — PROGRESS NOTES
" [facility]     Nephrology Progress Note      Patient Name: Nelson Wang  : 1945  MRN: [id number]  Primary Care Physician:  Janna Mo MD  Date of admission: 2024    Subjective   Subjective     Interval History:  PT awake and alert today.  Sitting in bed, persistent cough, unable to bring up phlegm.  Reportedly did not sleep all night.  No issues with peritoneal dialysis.  Ultrafiltration about 1500 cc last 24 hours.    Objective   Objective     Vitals:   Temp:  [98.2 °F (36.8 °C)-98.6 °F (37 °C)] 98.6 °F (37 °C)  Heart Rate:  [82-98] 87  Resp:  [18-20] 18  BP: ()/(34-73) 102/44  Flow (L/min):  [1-2] 1  Physical Exam:   Constitutional: Alert, sitting in bed, persistent cough.   Eyes: sclerae anicteric, no conjunctival injection   HENT: mucous membranes moist   Neck: Supple, no thyromegaly, no lymphadenopathy, trachea midline, full EJ   Respiratory: Decreased with scattered crackles bilaterally, nonlabored respirations, upper airway rhonchi.   Cardiovascular: RRR, no murmurs, rubs, or gallops.   Gastrointestinal: Positive bowel sounds, soft, nontender, nondistended, PD catheter exit site is covered.   Musculoskeletal: No edema, no clubbing or cyanosis   Psychiatric: \"Reparative   Neurologic: Awake, alert, moving extremities.   Skin: warm and dry, no rashes     Result Review    Result Reviewed:  I have personally reviewed the results from the time of this admission to 3/6/2024 15:29 EST and agree with these findings:  [x]  Laboratory  []  Microbiology  [x]  Radiology  []  EKG/Telemetry   []  Cardiology/Vascular   []  Pathology  []  Old records  []  Other:        Lab 24  0522 24  0415 24  0543 24  0255 24  1158 24  0527 24  0403 24  2254   SODIUM 142 140  --  140 139 138 136 139   SODIUM, ARTERIAL  --   --  137.6  --   --   --   --   --    POTASSIUM 3.2* 4.0  --  3.6 3.3* 3.5 3.9 4.1   CHLORIDE 104 101  --  102 102 102 100 99   CO2 26.7 " 23.6  --  25.3 24.2 26.0 25.1 28.7   BUN 35* 32*  --  34* 35* 35* 33* 22   CREATININE 6.87* 6.11*  --  5.93* 6.36* 7.02* 6.40* 6.04*   GLUCOSE 196* 200*  --  209* 151* 142* 170* 164*   GLUCOSE, ARTERIAL  --   --  140*  --   --   --   --   --    EGFR 7.6* 8.8*  --  9.1* 8.4* 7.4* 8.3* 8.9*   ANION GAP 11.3 15.4*  --  12.7 12.8 10.0 10.9 11.3   MAGNESIUM 1.5*  --   --  1.7  --  1.6  --   --    PHOSPHORUS 3.5  --   --  4.9*  --  5.3*  --   --            Assessment & Plan   Assessment / Plan       Active Hospital Problems:  Active Hospital Problems    Diagnosis     **Acute on chronic respiratory failure with hypoxia     HCAP (healthcare-associated pneumonia)     ESRD (end stage renal disease)     Aspiration pneumonitis     Peritoneal dialysis catheter in place     Essential hypertension     Anemia due to chronic kidney disease     Type 2 diabetes mellitus without complication        Assessment and Plan:    - End-stage renal disease on peritoneal dialysis, with evidence of increased volume, transient hypoxia.  continue 2.5% dextrose solution for the night cycler and the midday exchange today.  Discussed with daughter who is doing his dialysis.  Continue gentamicin 0.1% cream daily application to PD catheter exit site.  Continue with nystatin swish and swallow for fungal peritonitis prevention while on antibiotics.  - Pneumonia, status post bronchoscopy, per pulmonary.  Antibiotics and steroids currently.  Concerns for silent aspiration.  - Hypertension, blood pressure is acceptable.  - Anemia of chronic kidney disease, hemoglobin below target but no change today 9.1.  On long-acting RONALD as outpatient.  Monitor for now.    - Type 2 diabetes with complications.  -- Hypokalemia: Being replaced  - Hypomagnesemia, being replaced.  - Hyperphosphatemia, relatively poor p.o. intake, I will decrease sevelamer to 1 p.o. 3 times daily with meals.  Discussed with nursing staff.  Will follow.    Electronically signed by Coreen Dolan  MD Gloria, 03/06/24, 3:32 PM EST.

## 2024-03-06 NOTE — PLAN OF CARE
Goal Outcome Evaluation:  Plan of Care Reviewed With: patient, family        Progress: no change  Outcome Evaluation: Patient has been awake all night. Was increasingly restless at one point. Up and down out of bed and disoriented. On call provider ordered one time ativan dose. Medication helped calm patient but he remained awake. Per safety watch, patient will be quiet and then talk to himself and keep from sleeping. PRN cough med given. New order obtained for protonix to help with acid reflux. Safety watch remains at bedside. Family present to perform PD cycler exchange

## 2024-03-06 NOTE — PROGRESS NOTES
[facility]   Hospitalist Progress Note  Date: 3/6/2024  Patient Name: Nelson Wang  : 1945  MRN: [id number]  Date of admission: 2024      Subjective   Subjective     Chief complaint: Shortness of breath    Summary:  79-year-old male with history of diabetes, ESRD on PD, psoriasis, on monoclonal antibody therapy, immunosuppression secondary to Skyrizi, hypertension, dyslipidemia, recent C. difficile infection, hearing impairment, bipolar, CAD, vertigo, GERD without esophagitis, recent E. coli bacteremia with septic shock, C. difficile colitis, peritonitis, pneumonia, discharged with PICC line, to complete course of antibiotics, was arrived back into the hospital with worsening shortness of breath, volume overloaded, worsening progression of multifocal pneumonia, bilateral pleural effusions, pulmonary consulted, nephrology consulted, bronchoscopy performed, mucous plugging removed, increasing confused and agitated, required sedating medications to be initiated, psychiatry consulted, patient finally rested after several days, did have some decompensation in breathing, pulmonary following    Interval follow-up: Seen and examined this morning, overnight events include manic behavior, patient stayed up all night, recognizing himself that he may be in a manic phase.  More alert and awake this morning, talkative, focused, easily reoriented.  Blood pressure soft over the past 24 hours 99/34, down to 1 L nasal cannula oxygen with sats in the 90s, with room to wean down.  Creatinine 6.87, potassium 3.2, BUN 35, bicarb 26.  I did speak with psychiatry and consult them for further evaluation.  He may need additional psychiatric medications to help control this.  Of amirah.  No nausea or vomiting.  Tolerating oral intake.    Review of systems:  All systems reviewed and negative except for weakness, fatigue, intermittent confusion, manic behav    Objective   Objective     Vitals:   Temp:  [98.2 °F (36.8 °C)-98.6  °F (37 °C)] 98.6 °F (37 °C)  Heart Rate:  [82-98] 87  Resp:  [18-20] 18  BP: ()/(34-73) 102/44  Flow (L/min):  [1-2] 1  Physical Exam     Constitutional: Awake, alert, no acute distress laying in bed, communicating appropriately, alert and oriented x 3   Eyes: Pupils equal, sclerae anicteric, no conjunctival injection   HENT: NCAT, mucous membranes moist   Neck: Supple, full range of motion  Respiratory: Diminished coarse breath sounds with no rhonchi or wheezing   Cardiovascular: RRR, no murmurs, rubs, or gallops, palpable pedal pulses bilaterally   Gastrointestinal: Positive bowel sounds, soft, nontender, distended, PD catheter in place    Musculoskeletal: No bilateral ankle edema, no clubbing or cyanosis to extremities   Psychiatric: Fast talking   Neurologic: Oriented x 3, strength symmetric in all extremities, Cranial Nerves grossly intact to confrontation, speech clear   Skin: No rashes visible on exposed skin; rectal exam deferred      Result Review    Result Review:  I have personally reviewed the pertinent results from the past 24 hours to 3/6/2024 14:15 EST and agree with these findings:  [x]  Laboratory   CBC          3/4/2024    02:55 3/5/2024    04:15 3/6/2024    05:22   CBC   WBC 6.13  4.23  4.52    RBC 3.22  3.46  3.10    Hemoglobin 9.4  10.2  9.1    Hematocrit 31.3  35.3  30.3    MCV 97.2  102.0  97.7    MCH 29.2  29.5  29.4    MCHC 30.0  28.9  30.0    RDW 17.2  17.3  16.8    Platelets 117  141  172      BMP          3/4/2024    02:55 3/4/2024    05:43 3/5/2024    04:15 3/6/2024    05:22   BMP   BUN 34   32  35    Creatinine 5.93   6.11  6.87    Sodium 140  137.6  140  142    Potassium 3.6   4.0  3.2    Chloride 102   101  104    CO2 25.3   23.6  26.7    Calcium 8.2   8.2  8.1      LIVER FUNCTION TESTS:      Lab 03/06/24  0522 02/28/24  2254   TOTAL PROTEIN 5.7* 5.9*   ALBUMIN 2.3* 2.6*   GLOBULIN  --  3.3   ALT (SGPT) 13 13   AST (SGOT) 24 34   BILIRUBIN 0.4 0.3   BILIRUBIN DIRECT <0.2  --     ALK PHOS 52 65       [x]  Microbiology   Microbiology Results (last 10 days)       Procedure Component Value - Date/Time    Respiratory Culture - Wash, Bronchus [[id number]] Collected: 03/01/24 0932    Lab Status: Final result Specimen: Wash from Bronchus Updated: 03/03/24 1133     Respiratory Culture Moderate growth (3+) Normal respiratory lashae. No S. aureus or Pseudomonas aeruginosa detected. Final report.     Gram Stain Few (2+) Gram positive cocci in pairs and chains      Few (2+) WBCs seen    Fungus Culture - Lavage, Lung, Right Lower Lobe [[id number]] Collected: 03/01/24 0922    Lab Status: Preliminary result Specimen: Lavage from Lung, Right Lower Lobe Updated: 03/06/24 1015     Fungus Culture No fungus isolated at less than 1 week    Virus Culture - Lavage, Lung, Right Lower Lobe [[id number]] Collected: 03/01/24 0922    Lab Status: Preliminary result Specimen: Lavage from Lung, Right Lower Lobe Updated: 03/05/24 0406     Viral Culture, General Comment     Comment: Preliminary Report:  No virus isolated at 48 hours. Next report to follow after 4 days.       Narrative:      Performed at:  [address] [place] [address]  [id number]  : Maricarmen Mensah MD, Phone:  [phone]    AFB Culture - Lavage, Lung, Right Lower Lobe [[id number]] Collected: 03/01/24 0922    Lab Status: Preliminary result Specimen: Lavage from Lung, Right Lower Lobe Updated: 03/06/24 1015     AFB Culture No AFB isolated at less than 1 week     AFB Stain No acid fast bacilli seen on direct smear      No acid fast bacilli seen on concentrated smear    BAL Culture, Quantitative - Lavage, Lung, Right Lower Lobe [[id number]] Collected: 03/01/24 0922    Lab Status: Final result Specimen: Lavage from Lung, Right Lower Lobe Updated: 03/03/24 1132     BAL Culture 50,000 CFU/mL Normal respiratory lashae. No S. aureus or Pseudomonas aeruginosa detected. Final report.     Gram Stain Few (2+) Gram positive cocci in pairs  and chains      Rare (1+) WBCs seen    Pneumonia Panel - Lavage, Lung, Right Lower Lobe [[id number]]  (Normal) Collected: 03/01/24 0922    Lab Status: Final result Specimen: Lavage from Lung, Right Lower Lobe Updated: 03/01/24 1145     Escherichia coli PCR Not Detected     Acinetobacter calcoaceticus-baumannii complex PCR Not Detected     Enterobacter cloacae PCR Not Detected     Klebsiella oxytoca PCR Not Detected     Klebsiella pneumoniae group PCR Not Detected     Klebsiella aerogenes PCR Not Detected     Moraxella catarrhalis PCR Not Detected     Proteus species PCR Not Detected     Pseudomonas aeroginosa PCR Not Detected     Serratia marcescens PCR Not Detected     Staphylococcus aureus PCR Not Detected     Streptococcus pyogenes PCR Not Detected     Haemophilus influenzae PCR Not Detected     Streptococcus agalactiae PCR Not Detected     Streptococcus pneumoniae PCR Not Detected     Chlamydophila pneumoniae PCR Not Detected     Legionella pneumophilia PCR Not Detected     Mycoplasma pneumo by PCR Not Detected     ADENOVIRUS, PCR Not Detected     CTX-M Gene N/A     IMP Gene N/A     KPC Gene N/A     mecA/C and MREJ Gene N/A     NDM Gene N/A     OXA-48-like Gene N/A     VIM Gene N/A     Coronavirus Not Detected     Human Metapneumovirus Not Detected     Human Rhinovirus/Enterovirus Not Detected     Influenza A PCR Not Detected     Influenza B PCR Not Detected     RSV, PCR Not Detected     Parainfluenza virus PCR Not Detected    Respiratory Panel PCR w/COVID-19(SARS-CoV-2) RA/TANIA/GAVIN/PAD/COR/NENA In-House, NP Swab in UTM/VTM, 2 HR TAT - Swab, Nasopharynx [[id number]]  (Normal) Collected: 02/29/24 0959    Lab Status: Final result Specimen: Swab from Nasopharynx Updated: 02/29/24 1131     ADENOVIRUS, PCR Not Detected     Coronavirus 229E Not Detected     Coronavirus HKU1 Not Detected     Coronavirus NL63 Not Detected     Coronavirus OC43 Not Detected     COVID19 Not Detected     Human Metapneumovirus Not Detected      Human Rhinovirus/Enterovirus Not Detected     Influenza A PCR Not Detected     Influenza B PCR Not Detected     Parainfluenza Virus 1 Not Detected     Parainfluenza Virus 2 Not Detected     Parainfluenza Virus 3 Not Detected     Parainfluenza Virus 4 Not Detected     RSV, PCR Not Detected     Bordetella pertussis pcr Not Detected     Bordetella parapertussis PCR Not Detected     Chlamydophila pneumoniae PCR Not Detected     Mycoplasma pneumo by PCR Not Detected    Narrative:      In the setting of a positive respiratory panel with a viral infection PLUS a negative procalcitonin without other underlying concern for bacterial infection, consider observing off antibiotics or discontinuation of antibiotics and continue supportive care. If the respiratory panel is positive for atypical bacterial infection (Bordetella pertussis, Chlamydophila pneumoniae, or Mycoplasma pneumoniae), consider antibiotic de-escalation to target atypical bacterial infection.    MRSA Screen, PCR (Inpatient) - Swab, Nares [[id number]]  (Normal) Collected: 02/29/24 0557    Lab Status: Final result Specimen: Swab from Nares Updated: 02/29/24 0826     MRSA PCR No MRSA Detected    Narrative:      The negative predictive value of this diagnostic test is high and should only be used to consider de-escalating anti-MRSA therapy. A positive result may indicate colonization with MRSA and must be correlated clinically.    COVID-19, FLU A/B, RSV PCR 1 HR TAT - Swab, Nasopharynx [[id number]]  (Normal) Collected: 02/28/24 2201    Lab Status: Final result Specimen: Swab from Nasopharynx Updated: 02/28/24 2244     COVID19 Not Detected     Influenza A PCR Not Detected     Influenza B PCR Not Detected     RSV, PCR Not Detected    Narrative:      Fact sheet for providers: https://www.fda.gov/media/613168/download    Fact sheet for patients: https://www.fda.gov/media/117783/download    Test performed by PCR.    Blood Culture - Blood, Hand, Left [[id number]]  (Normal)  Collected: 02/28/24 2150    Lab Status: Final result Specimen: Blood from Hand, Left Updated: 03/04/24 2200     Blood Culture No growth at 5 days    Blood Culture - Blood, Hand, Left [[id number]]  (Normal) Collected: 02/28/24 2150    Lab Status: Final result Specimen: Blood from Hand, Left Updated: 03/04/24 2200     Blood Culture No growth at 5 days              [x]  Radiology CT Head Without Contrast    Result Date: 3/5/2024    1. Atrophy and changes of chronic microvascular ischemic disease, but no acute intracranial pathology.     EMILY SOTO MD       Electronically Signed and Approved By: EMILY SOTO MD on 3/05/2024 at 16:02             XR Chest 1 View    Result Date: 3/4/2024   Worsening of bilateral infiltrates with worsening of a right pleural effusion.       ERIC ELLISON MD       Electronically Signed and Approved By: ERIC ELLISON MD on 3/04/2024 at 6:12             CT Chest Without Contrast Diagnostic    Result Date: 2/29/2024   Worsening progression is suggested by CT increased bilateral pulmonary consolidation, much greater on the right than the left.  The findings suggest infectious multifocal pneumonia.  Aspiration pneumonia be excluded (especially in the right lower lobe).  New or increased pleural effusions are seen (small in volume).  Gallstones are present without definite acute cholecystitis.  Please see above comments for further detail.     Please note that portions of this note were completed with a voice recognition program.  АНДРЕЙ DE LUNA JR, MD       Electronically Signed and Approved By: АНДРЕЙ DE LUNA JR, MD on 2/29/2024 at 0:10              XR Chest 1 View    Result Date: 2/28/2024   Improving right basilar density compatible with persistent but resolving pneumonia.  The right IJ central venous catheter has been removed.  No new findings.       RENITA BARRAZA DO       Electronically Signed and Approved By: RENITA BARRAAZ DO on 2/28/2024 at 22:19                [x]  EKG/Telemetry    ECG 12 Lead ED Triage Standing Order; SOA   Final Result   HEART RATE= 83  bpm   RR Interval= 724  ms   CA Interval= 224  ms   P Horizontal Axis= -16  deg   P Front Axis= 24  deg   QRSD Interval= 94  ms   QT Interval= 426  ms   QTcB= 501  ms   QRS Axis= 3  deg   T Wave Axis= 9  deg   - ABNORMAL ECG -   Sinus rhythm   Prolonged CA interval   Low voltage, extremity and precordial leads   Nonspecific T abnrm, anterolateral leads   Minimal ST elevation, inferior leads   Prolonged QT interval   When compared with ECG of 22-Jul-2023 18:04:19,   Significant rate increase   Electronically Signed By: Bradley Hernandez ([facility]) 29-Feb-2024 14:20:52   Date and Time of Study: 2024-02-28 21:58:46          [x]  Cardiology/Vascular   []  Pathology  [x]  Old records  []  Other:    Assessment & Plan   Assessment / Plan     Assessment/Plan:  Assessment:  Acute on chronic hypoxic respiratory failure  Concern for worsening multifocal pneumonia versus aspiration pneumonia  Mucous plugging of the airway  Right pleural effusion  Elevated troponin unsure if type I or type II NSTEMI  End-stage renal disease on peritoneal dialysis  Diabetes mellitus  Hypertension  Hypothyroidism  Recent E. coli bacteremia  Recent C. difficile colitis  Delirium versus acute toxic encephalopathy due to poor clearance of medication in the setting of ESRD  Concern for manic episode with underlying bipolar disorder    Plan:  Labs and imaging reviewed  Psychiatry consulted discussed with Dr. Vines, follow-up recommendations  Monitor mentation overnight  TSH elevated, free T4 low, small increase in levothyroxine to 150 mcg daily  Trial of Restoril at night to help with sleep, psychiatry may change disorder  Wean oxygen per tolerance  Continue safety sitter  Continue Unasyn  Continue atorvastatin 40 mg daily  Continue aspirin 81 mg daily  Continue Pulmicort and Brovana nebs twice daily  Peritoneal dialysis per nephrology  Discussed at length with pulmonary Dr. Jimenez,  recommendations appreciated  Continue torsemide 100 mg daily  Continue telemetry monitor remotely  Diet to be resumed  A.m. labs  Full code  DVT prophylaxis with heparin  Clinical course dictate further management  Discussed with nurse at the bedside  Discussed with patient's daughter      DVT prophylaxis:  Medical DVT prophylaxis orders are present.        CODE STATUS:   Level Of Support Discussed With: Patient  Code Status (Patient has no pulse and is not breathing): CPR (Attempt to Resuscitate)  Medical Interventions (Patient has pulse or is breathing): Full Support      Electronically signed by Saúl Vargas MD, 03/06/24, 2:25 PM EST.  Portions of this documentation were transcribed electronically from a voice recognition software.  I confirm all data accurately represents the service(s) I performed at today's visit.

## 2024-03-06 NOTE — PROGRESS NOTES
Pulmonary / Critical Care Progress Note      Patient Name: Nelson Wang  : 1945  MRN: [id number]  Attending:  Saúl Vargas MD  Date of admission: 2024    Subjective   Subjective   Follow-up for pneumonia    Over the past 24 hours: Remains on Unasyn IV.  Continues Brovana, Pulmicort, and DuoNebs.  Remains on Demadex.  Mentation has been fluctuating.    No acute events overnight.     This morning,  Currently on supplemental oxygen.  Out of bed to chair.  Feels like he is having a manic episode per his report.  Denies any chest pain or chest tightness  Denies cough or hemoptysis  No fever or chills    Objective   Objective     Vitals:   Temp:  [98.2 °F (36.8 °C)-98.6 °F (37 °C)] 98.2 °F (36.8 °C)  Heart Rate:  [91-98] 91  Resp:  [18] 18  BP: (110-155)/(40-73) 134/73  Flow (L/min):  [2] 2    Physical Exam   Vital Signs Reviewed   General:  WDWN, Alert, NAD. elderly male, lying in bed  HEENT:  PERRL, EOMI.  OP, nares clear  Chest: Diminished with coarse crackles at bases bilaterally, no work of breathing noted on room air  CV: RRR, no MGR, pulses 2+, equal.  Abd:  Soft, NT, ND, + BS  EXT:  no clubbing, no cyanosis, no edema  Neuro:  A&Ox2, more confused this morning, CN grossly intact, no focal deficits.  Skin: No rashes or lesions noted, multiple wounds noted please see charting    Result Review    Result Review:  I have personally reviewed the results from the time of this admission to 3/6/2024 07:31 EST and agree with these findings:  [x]  Laboratory  [x]  Microbiology  [x]  Radiology  []  EKG/Telemetry   []  Cardiology/Vascular   []  Pathology  []  Old records  []  Other:  Most notable findings include:         Lab 24  0522 24  0415 24  0543 24  0255 24  1158 24  0527 24  0403 24  2254 24  2254 24  2150   WBC 4.52 4.23  --  6.13 6.62 7.45 7.46  --   --  10.09   HEMOGLOBIN 9.1* 10.2*  --  9.4* 8.8* 8.8* 9.2*  --   --  10.9*   HEMATOCRIT  30.3* 35.3*  --  31.3* 28.4* 29.4* 29.9*  --   --  34.8*   PLATELETS 172 141  --  117* 107* 128* 155  --   --  230   SODIUM 142 140  --  140 139 138 136   < > 139  --    SODIUM, ARTERIAL  --   --  137.6  --   --   --   --   --   --   --    POTASSIUM 3.2* 4.0  --  3.6 3.3* 3.5 3.9  --  4.1  --    CHLORIDE 104 101  --  102 102 102 100  --  99  --    CO2 26.7 23.6  --  25.3 24.2 26.0 25.1  --  28.7  --    BUN 35* 32*  --  34* 35* 35* 33*  --  22  --    CREATININE 6.87* 6.11*  --  5.93* 6.36* 7.02* 6.40*  --  6.04*  --    GLUCOSE 196* 200*  --  209* 151* 142* 170*   < > 164*  --    GLUCOSE, ARTERIAL  --   --  140*  --   --   --   --   --   --   --    CALCIUM 8.1* 8.2*  --  8.2* 7.7* 7.9* 8.0*  --  8.3*  --    PHOSPHORUS 3.5  --   --  4.9*  --  5.3*  --   --   --   --    TOTAL PROTEIN 5.7*  --   --   --   --   --   --   --  5.9*  --    ALBUMIN 2.3*  --   --   --   --   --   --   --  2.6*  --    GLOBULIN  --   --   --   --   --   --   --   --  3.3  --     < > = values in this interval not displayed.     Bronchoscopy BAL negative.  Bronchoscopy note personally reviewed  Assessment & Plan   Assessment / Plan     Active Hospital Problems:  Active Hospital Problems    Diagnosis     **Acute on chronic respiratory failure with hypoxia     HCAP (healthcare-associated pneumonia)     ESRD (end stage renal disease)     Aspiration pneumonitis     Peritoneal dialysis catheter in place     Essential hypertension     Anemia due to chronic kidney disease     Type 2 diabetes mellitus without complication      Impression:    Acute hypoxic respiratory failure requiring supplemental oxygen  Concern for aspiration pneumonia  NSTEMI type I versus type II  ESRD on peritoneal dialysis  Anemia of chronic kidney disease  T2DM with hyperglycemia  Hypomagnesemia       Plan:    -Supplemental oxygen to keep saturation more than 90%.  Continue maintain SpO2 greater than 90%  -Continue Unasyn for total 7 days in the setting of aspiration  pneumonia  -Continue Brovana, Pulmicort, DuoNebs  -Continue chest physiotherapy  -Continue bronchopulmonary hygiene.  Encourage I-S and flutter  -Continue to monitor renal panel and electrolytes.  Electrolytes as necessary  -Continue aspiration precautions.  Upright for all p.o. intake  -Speech pathology following, appreciate input.  -Peritoneal dialysis per nephrology, appreciate input.  -Continue delirium precautions and Seroquel for now.  Psychiatry to evaluate.  -Follow-up pulmonary clinic 1 to 2 weeks after discharge     DVT prophylaxis:  Medical DVT prophylaxis orders are present.    CODE STATUS:   Level Of Support Discussed With: Patient  Code Status (Patient has no pulse and is not breathing): CPR (Attempt to Resuscitate)  Medical Interventions (Patient has pulse or is breathing): Full Support    I have reviewed labs, imaging, pertinent clinical data and provider notes.   I have discussed with bedside nurse and primary service.     Electronically signed by Preston Jimenez MD, 03/06/24, 7:31 AM EST.

## 2024-03-07 LAB
ALBUMIN SERPL-MCNC: 2.6 G/DL (ref 3.5–5.2)
ALP SERPL-CCNC: 54 U/L (ref 39–117)
ALT SERPL W P-5'-P-CCNC: 13 U/L (ref 1–41)
ANION GAP SERPL CALCULATED.3IONS-SCNC: 11.5 MMOL/L (ref 5–15)
AST SERPL-CCNC: 23 U/L (ref 1–40)
BASOPHILS # BLD AUTO: 0.06 10*3/MM3 (ref 0–0.2)
BASOPHILS NFR BLD AUTO: 1.3 % (ref 0–1.5)
BILIRUB CONJ SERPL-MCNC: <0.2 MG/DL (ref 0–0.3)
BILIRUB INDIRECT SERPL-MCNC: ABNORMAL MG/DL
BILIRUB SERPL-MCNC: 0.4 MG/DL (ref 0–1.2)
BUN SERPL-MCNC: 29 MG/DL (ref 8–23)
BUN/CREAT SERPL: 4.6 (ref 7–25)
CALCIUM SPEC-SCNC: 8.5 MG/DL (ref 8.6–10.5)
CHLORIDE SERPL-SCNC: 103 MMOL/L (ref 98–107)
CO2 SERPL-SCNC: 28.5 MMOL/L (ref 22–29)
CREAT SERPL-MCNC: 6.28 MG/DL (ref 0.76–1.27)
DEPRECATED RDW RBC AUTO: 59.8 FL (ref 37–54)
EGFRCR SERPLBLD CKD-EPI 2021: 8.5 ML/MIN/1.73
EOSINOPHIL # BLD AUTO: 0.19 10*3/MM3 (ref 0–0.4)
EOSINOPHIL NFR BLD AUTO: 4 % (ref 0.3–6.2)
ERYTHROCYTE [DISTWIDTH] IN BLOOD BY AUTOMATED COUNT: 16.6 % (ref 12.3–15.4)
GLUCOSE BLDC GLUCOMTR-MCNC: 178 MG/DL (ref 70–99)
GLUCOSE BLDC GLUCOMTR-MCNC: 178 MG/DL (ref 70–99)
GLUCOSE BLDC GLUCOMTR-MCNC: 203 MG/DL (ref 70–99)
GLUCOSE BLDC GLUCOMTR-MCNC: 219 MG/DL (ref 70–99)
GLUCOSE SERPL-MCNC: 205 MG/DL (ref 65–99)
HCT VFR BLD AUTO: 34.3 % (ref 37.5–51)
HGB BLD-MCNC: 10.1 G/DL (ref 13–17.7)
IMM GRANULOCYTES # BLD AUTO: 0.02 10*3/MM3 (ref 0–0.05)
IMM GRANULOCYTES NFR BLD AUTO: 0.4 % (ref 0–0.5)
LYMPHOCYTES # BLD AUTO: 1.76 10*3/MM3 (ref 0.7–3.1)
LYMPHOCYTES NFR BLD AUTO: 36.7 % (ref 19.6–45.3)
MAGNESIUM SERPL-MCNC: 2 MG/DL (ref 1.6–2.4)
MCH RBC QN AUTO: 29 PG (ref 26.6–33)
MCHC RBC AUTO-ENTMCNC: 29.4 G/DL (ref 31.5–35.7)
MCV RBC AUTO: 98.6 FL (ref 79–97)
MONOCYTES # BLD AUTO: 0.84 10*3/MM3 (ref 0.1–0.9)
MONOCYTES NFR BLD AUTO: 17.5 % (ref 5–12)
NEUTROPHILS NFR BLD AUTO: 1.93 10*3/MM3 (ref 1.7–7)
NEUTROPHILS NFR BLD AUTO: 40.1 % (ref 42.7–76)
NRBC BLD AUTO-RTO: 0 /100 WBC (ref 0–0.2)
PHOSPHATE SERPL-MCNC: 2.7 MG/DL (ref 2.5–4.5)
PLATELET # BLD AUTO: 184 10*3/MM3 (ref 140–450)
PMV BLD AUTO: 11.1 FL (ref 6–12)
POTASSIUM SERPL-SCNC: 3.4 MMOL/L (ref 3.5–5.2)
PROT SERPL-MCNC: 6.1 G/DL (ref 6–8.5)
RBC # BLD AUTO: 3.48 10*6/MM3 (ref 4.14–5.8)
SODIUM SERPL-SCNC: 143 MMOL/L (ref 136–145)
WBC NRBC COR # BLD AUTO: 4.8 10*3/MM3 (ref 3.4–10.8)

## 2024-03-07 PROCEDURE — 25010000002 AMPICILLIN-SULBACTAM PER 1.5 G: Performed by: INTERNAL MEDICINE

## 2024-03-07 PROCEDURE — 83735 ASSAY OF MAGNESIUM: CPT | Performed by: FAMILY MEDICINE

## 2024-03-07 PROCEDURE — 80048 BASIC METABOLIC PNL TOTAL CA: CPT | Performed by: FAMILY MEDICINE

## 2024-03-07 PROCEDURE — 85025 COMPLETE CBC W/AUTO DIFF WBC: CPT | Performed by: FAMILY MEDICINE

## 2024-03-07 PROCEDURE — 82948 REAGENT STRIP/BLOOD GLUCOSE: CPT

## 2024-03-07 PROCEDURE — 82948 REAGENT STRIP/BLOOD GLUCOSE: CPT | Performed by: STUDENT IN AN ORGANIZED HEALTH CARE EDUCATION/TRAINING PROGRAM

## 2024-03-07 PROCEDURE — 84100 ASSAY OF PHOSPHORUS: CPT | Performed by: FAMILY MEDICINE

## 2024-03-07 PROCEDURE — 25010000002 HEPARIN (PORCINE) PER 1000 UNITS: Performed by: INTERNAL MEDICINE

## 2024-03-07 PROCEDURE — 92526 ORAL FUNCTION THERAPY: CPT

## 2024-03-07 PROCEDURE — 80076 HEPATIC FUNCTION PANEL: CPT | Performed by: FAMILY MEDICINE

## 2024-03-07 PROCEDURE — 94761 N-INVAS EAR/PLS OXIMETRY MLT: CPT

## 2024-03-07 PROCEDURE — 94799 UNLISTED PULMONARY SVC/PX: CPT

## 2024-03-07 PROCEDURE — 94664 DEMO&/EVAL PT USE INHALER: CPT

## 2024-03-07 PROCEDURE — 63710000001 INSULIN REGULAR HUMAN PER 5 UNITS: Performed by: FAMILY MEDICINE

## 2024-03-07 PROCEDURE — 99233 SBSQ HOSP IP/OBS HIGH 50: CPT | Performed by: INTERNAL MEDICINE

## 2024-03-07 PROCEDURE — 94669 MECHANICAL CHEST WALL OSCILL: CPT

## 2024-03-07 RX ORDER — FAMOTIDINE 20 MG/1
20 TABLET, FILM COATED ORAL NIGHTLY
Status: DISCONTINUED | OUTPATIENT
Start: 2024-03-07 | End: 2024-03-09 | Stop reason: HOSPADM

## 2024-03-07 RX ORDER — ARIPIPRAZOLE 2 MG/1
2 TABLET ORAL NIGHTLY
Status: DISCONTINUED | OUTPATIENT
Start: 2024-03-08 | End: 2024-03-09 | Stop reason: HOSPADM

## 2024-03-07 RX ORDER — PANTOPRAZOLE SODIUM 40 MG/1
40 TABLET, DELAYED RELEASE ORAL
Status: DISCONTINUED | OUTPATIENT
Start: 2024-03-08 | End: 2024-03-09 | Stop reason: HOSPADM

## 2024-03-07 RX ORDER — POTASSIUM CHLORIDE 20 MEQ/1
20 TABLET, EXTENDED RELEASE ORAL DAILY
Status: DISCONTINUED | OUTPATIENT
Start: 2024-03-07 | End: 2024-03-09 | Stop reason: HOSPADM

## 2024-03-07 RX ADMIN — Medication 250 MG: at 08:44

## 2024-03-07 RX ADMIN — POTASSIUM CHLORIDE 20 MEQ: 1500 TABLET, EXTENDED RELEASE ORAL at 10:33

## 2024-03-07 RX ADMIN — HEPARIN SODIUM 5000 UNITS: 5000 INJECTION INTRAVENOUS; SUBCUTANEOUS at 22:02

## 2024-03-07 RX ADMIN — BUDESONIDE 0.5 MG: 0.5 INHALANT RESPIRATORY (INHALATION) at 06:30

## 2024-03-07 RX ADMIN — HEPARIN SODIUM 5000 UNITS: 5000 INJECTION INTRAVENOUS; SUBCUTANEOUS at 06:29

## 2024-03-07 RX ADMIN — PANTOPRAZOLE SODIUM 40 MG: 40 INJECTION, POWDER, FOR SOLUTION INTRAVENOUS at 06:28

## 2024-03-07 RX ADMIN — LORAZEPAM 0.25 MG: 0.5 TABLET ORAL at 22:27

## 2024-03-07 RX ADMIN — ATORVASTATIN CALCIUM 40 MG: 40 TABLET, FILM COATED ORAL at 08:44

## 2024-03-07 RX ADMIN — ARFORMOTEROL TARTRATE 15 MCG: 15 SOLUTION RESPIRATORY (INHALATION) at 19:51

## 2024-03-07 RX ADMIN — NYSTATIN 500000 UNITS: 100000 SUSPENSION ORAL at 12:37

## 2024-03-07 RX ADMIN — NYSTATIN 500000 UNITS: 100000 SUSPENSION ORAL at 17:26

## 2024-03-07 RX ADMIN — NYSTATIN 500000 UNITS: 100000 SUSPENSION ORAL at 22:02

## 2024-03-07 RX ADMIN — NYSTATIN 500000 UNITS: 100000 SUSPENSION ORAL at 08:43

## 2024-03-07 RX ADMIN — GENTAMICIN SULFATE 1 APPLICATION: 1 CREAM TOPICAL at 08:47

## 2024-03-07 RX ADMIN — BUDESONIDE 0.5 MG: 0.5 INHALANT RESPIRATORY (INHALATION) at 19:51

## 2024-03-07 RX ADMIN — HEPARIN SODIUM 5000 UNITS: 5000 INJECTION INTRAVENOUS; SUBCUTANEOUS at 14:24

## 2024-03-07 RX ADMIN — Medication 250 MG: at 22:02

## 2024-03-07 RX ADMIN — FAMOTIDINE 20 MG: 20 TABLET ORAL at 22:02

## 2024-03-07 RX ADMIN — ARIPIPRAZOLE 2 MG: 2 TABLET ORAL at 08:44

## 2024-03-07 RX ADMIN — Medication 10 ML: at 22:03

## 2024-03-07 RX ADMIN — ASPIRIN 81 MG: 81 TABLET, COATED ORAL at 08:44

## 2024-03-07 RX ADMIN — HYDROCODONE POLISTIREX AND CHLORPHENIRAMINE POLISTIREX 2.5 ML: 10; 8 SUSPENSION, EXTENDED RELEASE ORAL at 22:09

## 2024-03-07 RX ADMIN — SEVELAMER CARBONATE 800 MG: 800 TABLET, FILM COATED ORAL at 17:26

## 2024-03-07 RX ADMIN — INSULIN HUMAN 2 UNITS: 100 INJECTION, SOLUTION PARENTERAL at 22:02

## 2024-03-07 RX ADMIN — Medication 10 ML: at 08:45

## 2024-03-07 RX ADMIN — INSULIN HUMAN 4 UNITS: 100 INJECTION, SOLUTION PARENTERAL at 08:44

## 2024-03-07 RX ADMIN — Medication 5 MG: at 22:02

## 2024-03-07 RX ADMIN — ARFORMOTEROL TARTRATE 15 MCG: 15 SOLUTION RESPIRATORY (INHALATION) at 06:30

## 2024-03-07 RX ADMIN — TORSEMIDE 100 MG: 20 TABLET ORAL at 08:44

## 2024-03-07 RX ADMIN — INSULIN HUMAN 2 UNITS: 100 INJECTION, SOLUTION PARENTERAL at 12:37

## 2024-03-07 RX ADMIN — AMPICILLIN AND SULBACTAM 3 G: 2; 1 INJECTION, POWDER, FOR SOLUTION INTRAVENOUS at 08:44

## 2024-03-07 RX ADMIN — SODIUM CHLORIDE, SODIUM LACTATE, CALCIUM CHLORIDE, MAGNESIUM CHLORIDE AND DEXTROSE 6000 ML: 1.5; 538; 448; 18.3; 5.08 INJECTION, SOLUTION INTRAPERITONEAL at 22:03

## 2024-03-07 RX ADMIN — SEVELAMER CARBONATE 800 MG: 800 TABLET, FILM COATED ORAL at 08:44

## 2024-03-07 RX ADMIN — INSULIN HUMAN 4 UNITS: 100 INJECTION, SOLUTION PARENTERAL at 17:52

## 2024-03-07 NOTE — PLAN OF CARE
Goal Outcome Evaluation:  Plan of Care Reviewed With: patient, daughter        Progress: improving  Outcome Evaluation: PRN ativan and tussionex given overnight. Daughter at bedside to perform PD cycler exchanges. Patient slept more than previous shift. Remains fidgety, pulls oxygen off frequently while sleeping. Able to maintain saturations above 90%.

## 2024-03-07 NOTE — PLAN OF CARE
Goal Outcome Evaluation:  Plan of Care Reviewed With: patient, daughter           Outcome Evaluation: patient remains alert and oriented x4, with intermittant confusion, on room air throughout shift. blood glucose monitored. patient reports being tired, rested in bed and recliner throughout shift. safety watch continues at bedside due to sporadic behavior. no new issues at this time.

## 2024-03-07 NOTE — PROGRESS NOTES
[facility]   Hospitalist Progress Note  Date: 3/7/2024  Patient Name: Nelson Wang  : 1945  MRN: [id number]  Date of admission: 2024      Subjective   Subjective     Chief complaint: Shortness of breath    Summary:  79-year-old male with history of diabetes, ESRD on PD, psoriasis, on monoclonal antibody therapy, immunosuppression secondary to Skyrizi, hypertension, dyslipidemia, recent C. difficile infection, hearing impairment, bipolar, CAD, vertigo, GERD without esophagitis, recent E. coli bacteremia with septic shock, C. difficile colitis, peritonitis, pneumonia, discharged with PICC line, to complete course of antibiotics, was arrived back into the hospital with worsening shortness of breath, volume overloaded, worsening progression of multifocal pneumonia, bilateral pleural effusions, pulmonary consulted, nephrology consulted, bronchoscopy performed, mucous plugging removed, increasing confused and agitated, required sedating medications to be initiated, psychiatry consulted, patient finally rested after several days, did have some decompensation in breathing, pulmonary following, started on Abilify, seems to be helping    Interval follow-up: Seen and examined this morning, much calmer overnight, did sleep more so with the lorazepam, seems to be rather heavy dose for him, may make adjustments.  Abilify seems to be helping, he confirms his mood and racing thoughts have subsided, he is more focused and has less pressured speech.  He is weaning down oxygen, satting well on room air actually 98%.  Blood pressures are stable, heart rate in the 90s.  Tolerating peritoneal dialysis.  Creatinine 6.28, BUN 29, potassium 3.4, hemoglobin 10.1, bicarb 28.  Denies chest pain or palpitation.  Remains weak and fatigued.  Still with ongoing cough.  Fidgety at times.    Review of systems:  All systems reviewed and negative except for weakness, fatigue, fidgety, cough      Objective   Objective     Vitals:    Temp:  [97.2 °F (36.2 °C)-98.7 °F (37.1 °C)] 97.2 °F (36.2 °C)  Heart Rate:  [83-91] 91  Resp:  [18] 18  BP: (101-144)/(65-79) 122/66  Flow (L/min):  [2] 2  Physical Exam     Constitutional: Awake, alert, communicating appropriately, alert and oriented x 3 sitting in the bedside chair undergoing PD   Eyes: Pupils equal, sclerae anicteric, no conjunctival injection   HENT: NCAT, mucous membranes moist   Neck: Supple, full range of motion  Respiratory: Diminished coarse breath sounds with no rhonchi or wheezing   Cardiovascular: RRR, no murmurs, rubs, or gallops, palpable pedal pulses bilaterally   Gastrointestinal: Positive bowel sounds, soft, nontender, distended, PD catheter in place    Musculoskeletal: No bilateral ankle edema, no clubbing or cyanosis to extremities   Psychiatric: Calm and cooperative   Neurologic: Oriented x 3, strength symmetric in all extremities, Cranial Nerves grossly intact to confrontation, speech clear   Skin: No rashes visible on exposed skin; rectal exam deferred      Result Review    Result Review:  I have personally reviewed the pertinent results from the past 24 hours to 3/7/2024 14:00 EST and agree with these findings:  [x]  Laboratory   CBC          3/5/2024    04:15 3/6/2024    05:22 3/7/2024    07:46   CBC   WBC 4.23  4.52  4.80    RBC 3.46  3.10  3.48    Hemoglobin 10.2  9.1  10.1    Hematocrit 35.3  30.3  34.3    .0  97.7  98.6    MCH 29.5  29.4  29.0    MCHC 28.9  30.0  29.4    RDW 17.3  16.8  16.6    Platelets 141  172  184      BMP          3/5/2024    04:15 3/6/2024    05:22 3/7/2024    07:46   BMP   BUN 32  35  29    Creatinine 6.11  6.87  6.28    Sodium 140  142  143    Potassium 4.0  3.2  3.4    Chloride 101  104  103    CO2 23.6  26.7  28.5    Calcium 8.2  8.1  8.5      LIVER FUNCTION TESTS:      Lab 03/07/24  0746 03/06/24  0522   TOTAL PROTEIN 6.1 5.7*   ALBUMIN 2.6* 2.3*   ALT (SGPT) 13 13   AST (SGOT) 23 24   BILIRUBIN 0.4 0.4   BILIRUBIN DIRECT <0.2 <0.2    ALK PHOS 54 52       [x]  Microbiology   Microbiology Results (last 10 days)       Procedure Component Value - Date/Time    Respiratory Culture - Wash, Bronchus [[id number]] Collected: 03/01/24 0932    Lab Status: Final result Specimen: Wash from Bronchus Updated: 03/03/24 1133     Respiratory Culture Moderate growth (3+) Normal respiratory lashae. No S. aureus or Pseudomonas aeruginosa detected. Final report.     Gram Stain Few (2+) Gram positive cocci in pairs and chains      Few (2+) WBCs seen    Fungus Culture - Lavage, Lung, Right Lower Lobe [[id number]] Collected: 03/01/24 0922    Lab Status: Preliminary result Specimen: Lavage from Lung, Right Lower Lobe Updated: 03/06/24 1015     Fungus Culture No fungus isolated at less than 1 week    Virus Culture - Lavage, Lung, Right Lower Lobe [[id number]] Collected: 03/01/24 0922    Lab Status: Preliminary result Specimen: Lavage from Lung, Right Lower Lobe Updated: 03/06/24 1907     Viral Culture, General Comment     Comment: Preliminary Report:  No virus isolated at 48 hours. Next report to follow after 4 days.       Narrative:      Performed at:  [address] [place] [address]  [id number]  : Maricarmen Mensah MD, Phone:  [phone]    AFB Culture - Lavage, Lung, Right Lower Lobe [[id number]] Collected: 03/01/24 0922    Lab Status: Preliminary result Specimen: Lavage from Lung, Right Lower Lobe Updated: 03/06/24 1015     AFB Culture No AFB isolated at less than 1 week     AFB Stain No acid fast bacilli seen on direct smear      No acid fast bacilli seen on concentrated smear    BAL Culture, Quantitative - Lavage, Lung, Right Lower Lobe [[id number]] Collected: 03/01/24 0922    Lab Status: Final result Specimen: Lavage from Lung, Right Lower Lobe Updated: 03/03/24 1132     BAL Culture 50,000 CFU/mL Normal respiratory lashae. No S. aureus or Pseudomonas aeruginosa detected. Final report.     Gram Stain Few (2+) Gram positive cocci in pairs  and chains      Rare (1+) WBCs seen    Pneumonia Panel - Lavage, Lung, Right Lower Lobe [[id number]]  (Normal) Collected: 03/01/24 0922    Lab Status: Final result Specimen: Lavage from Lung, Right Lower Lobe Updated: 03/01/24 1145     Escherichia coli PCR Not Detected     Acinetobacter calcoaceticus-baumannii complex PCR Not Detected     Enterobacter cloacae PCR Not Detected     Klebsiella oxytoca PCR Not Detected     Klebsiella pneumoniae group PCR Not Detected     Klebsiella aerogenes PCR Not Detected     Moraxella catarrhalis PCR Not Detected     Proteus species PCR Not Detected     Pseudomonas aeroginosa PCR Not Detected     Serratia marcescens PCR Not Detected     Staphylococcus aureus PCR Not Detected     Streptococcus pyogenes PCR Not Detected     Haemophilus influenzae PCR Not Detected     Streptococcus agalactiae PCR Not Detected     Streptococcus pneumoniae PCR Not Detected     Chlamydophila pneumoniae PCR Not Detected     Legionella pneumophilia PCR Not Detected     Mycoplasma pneumo by PCR Not Detected     ADENOVIRUS, PCR Not Detected     CTX-M Gene N/A     IMP Gene N/A     KPC Gene N/A     mecA/C and MREJ Gene N/A     NDM Gene N/A     OXA-48-like Gene N/A     VIM Gene N/A     Coronavirus Not Detected     Human Metapneumovirus Not Detected     Human Rhinovirus/Enterovirus Not Detected     Influenza A PCR Not Detected     Influenza B PCR Not Detected     RSV, PCR Not Detected     Parainfluenza virus PCR Not Detected    Cytomegalovirus, PCR - Lavage, Lung, Right Lower Lobe [[id number]] Collected: 03/01/24 0922    Lab Status: Final result Specimen: Lavage from Lung, Right Lower Lobe Updated: 03/06/24 1904     Source Comment     Comment: BAL, RLL        Cytomegalovirus PCR Negative     Comment: -------------------ADDITIONAL INFORMATION-------------------  This test was developed and its performance characteristics  determined by [facility] in a manner consistent with CLIA  requirements. This test has not  been cleared or approved by  the [place] Food and Drug Administration.       Narrative:      Performed at:  01 - [facility]  [address], [place], [place]  [id number]  : Galileo Jeong , Phone:  [phone]    Respiratory Panel PCR w/COVID-19(SARS-CoV-2) RA/TANIA/GAVIN/PAD/COR/NENA In-House, NP Swab in UTM/VTM, 2 HR TAT - Swab, Nasopharynx [[id number]]  (Normal) Collected: 02/29/24 0959    Lab Status: Final result Specimen: Swab from Nasopharynx Updated: 02/29/24 1131     ADENOVIRUS, PCR Not Detected     Coronavirus 229E Not Detected     Coronavirus HKU1 Not Detected     Coronavirus NL63 Not Detected     Coronavirus OC43 Not Detected     COVID19 Not Detected     Human Metapneumovirus Not Detected     Human Rhinovirus/Enterovirus Not Detected     Influenza A PCR Not Detected     Influenza B PCR Not Detected     Parainfluenza Virus 1 Not Detected     Parainfluenza Virus 2 Not Detected     Parainfluenza Virus 3 Not Detected     Parainfluenza Virus 4 Not Detected     RSV, PCR Not Detected     Bordetella pertussis pcr Not Detected     Bordetella parapertussis PCR Not Detected     Chlamydophila pneumoniae PCR Not Detected     Mycoplasma pneumo by PCR Not Detected    Narrative:      In the setting of a positive respiratory panel with a viral infection PLUS a negative procalcitonin without other underlying concern for bacterial infection, consider observing off antibiotics or discontinuation of antibiotics and continue supportive care. If the respiratory panel is positive for atypical bacterial infection (Bordetella pertussis, Chlamydophila pneumoniae, or Mycoplasma pneumoniae), consider antibiotic de-escalation to target atypical bacterial infection.    MRSA Screen, PCR (Inpatient) - Swab, Nares [[id number]]  (Normal) Collected: 02/29/24 0557    Lab Status: Final result Specimen: Swab from Nares Updated: 02/29/24 0826     MRSA PCR No MRSA Detected    Narrative:      The negative predictive value  of this diagnostic test is high and should only be used to consider de-escalating anti-MRSA therapy. A positive result may indicate colonization with MRSA and must be correlated clinically.    COVID-19, FLU A/B, RSV PCR 1 HR TAT - Swab, Nasopharynx [[id number]]  (Normal) Collected: 02/28/24 2201    Lab Status: Final result Specimen: Swab from Nasopharynx Updated: 02/28/24 2244     COVID19 Not Detected     Influenza A PCR Not Detected     Influenza B PCR Not Detected     RSV, PCR Not Detected    Narrative:      Fact sheet for providers: https://www.fda.gov/media/719893/download    Fact sheet for patients: https://www.fda.gov/media/394639/download    Test performed by PCR.    Blood Culture - Blood, Hand, Left [[id number]]  (Normal) Collected: 02/28/24 2150    Lab Status: Final result Specimen: Blood from Hand, Left Updated: 03/04/24 2200     Blood Culture No growth at 5 days    Blood Culture - Blood, Hand, Left [[id number]]  (Normal) Collected: 02/28/24 2150    Lab Status: Final result Specimen: Blood from Hand, Left Updated: 03/04/24 2200     Blood Culture No growth at 5 days              [x]  Radiology CT Head Without Contrast    Result Date: 3/5/2024    1. Atrophy and changes of chronic microvascular ischemic disease, but no acute intracranial pathology.     EMILY SOTO MD       Electronically Signed and Approved By: MEILY SOTO MD on 3/05/2024 at 16:02             XR Chest 1 View    Result Date: 3/4/2024   Worsening of bilateral infiltrates with worsening of a right pleural effusion.       ERIC ELLISON MD       Electronically Signed and Approved By: ERIC ELLISON MD on 3/04/2024 at 6:12             CT Chest Without Contrast Diagnostic    Result Date: 2/29/2024   Worsening progression is suggested by CT increased bilateral pulmonary consolidation, much greater on the right than the left.  The findings suggest infectious multifocal pneumonia.  Aspiration pneumonia be excluded (especially in the right lower  lobe).  New or increased pleural effusions are seen (small in volume).  Gallstones are present without definite acute cholecystitis.  Please see above comments for further detail.     Please note that portions of this note were completed with a voice recognition program.  АНДРЕЙ DE LUNA JR, MD       Electronically Signed and Approved By: АНДРЕЙ DE LUNA JR, MD on 2/29/2024 at 0:10              XR Chest 1 View    Result Date: 2/28/2024   Improving right basilar density compatible with persistent but resolving pneumonia.  The right IJ central venous catheter has been removed.  No new findings.       RENITA BARRAZA DO       Electronically Signed and Approved By: RENITA BARRAZA DO on 2/28/2024 at 22:19                [x]  EKG/Telemetry   ECG 12 Lead ED Triage Standing Order; SOA   Final Result   HEART RATE= 83  bpm   RR Interval= 724  ms   WY Interval= 224  ms   P Horizontal Axis= -16  deg   P Front Axis= 24  deg   QRSD Interval= 94  ms   QT Interval= 426  ms   QTcB= 501  ms   QRS Axis= 3  deg   T Wave Axis= 9  deg   - ABNORMAL ECG -   Sinus rhythm   Prolonged WY interval   Low voltage, extremity and precordial leads   Nonspecific T abnrm, anterolateral leads   Minimal ST elevation, inferior leads   Prolonged QT interval   When compared with ECG of 22-Jul-2023 18:04:19,   Significant rate increase   Electronically Signed By: Bradley Hernandez ([facility]) 29-Feb-2024 14:20:52   Date and Time of Study: 2024-02-28 21:58:46          [x]  Cardiology/Vascular   []  Pathology  [x]  Old records  []  Other:    Assessment & Plan   Assessment / Plan     Assessment/Plan:  Assessment:  Acute on chronic hypoxic respiratory failure  Concern for worsening multifocal pneumonia versus aspiration pneumonia  Mucous plugging of the airway  Right pleural effusion  Elevated troponin unsure if type I or type II NSTEMI  End-stage renal disease on peritoneal dialysis  Diabetes mellitus  Hypertension  Hypothyroidism  Recent E. coli bacteremia  Recent C.  difficile colitis  Delirium versus acute toxic encephalopathy due to poor clearance of medication in the setting of ESRD  Concern for manic episode with underlying bipolar disorder    Plan:  Labs and imaging reviewed  Continue lorazepam 0.25 mg nightly  Continue Abilify 2 mg daily  Psychiatry consulted discussed with Dr. Vines, recommendations appreciated  Continue increased dose of levothyroxine to 150 mcg daily  Continue safety sitter; may be able to discontinue next 24 to 48 hours  Intestinex for cough suppression  Continue Unasyn  Continue atorvastatin 40 mg daily  Continue aspirin 81 mg daily  Continue Pulmicort and Brovana nebs twice daily  Peritoneal dialysis per nephrology  Discussed at length with pulmonary Dr. Jimenez, recommendations appreciated  Continue torsemide 100 mg daily  Continue telemetry monitor remotely  Diet to be resumed  A.m. labs  Full code  DVT prophylaxis with heparin  Clinical course dictate further management  Discussed with nurse at the bedside  Discussed with patient's daughter      DVT prophylaxis:  Medical DVT prophylaxis orders are present.        CODE STATUS:   Level Of Support Discussed With: Patient  Code Status (Patient has no pulse and is not breathing): CPR (Attempt to Resuscitate)  Medical Interventions (Patient has pulse or is breathing): Full Support    Electronically signed by Saúl Vargas MD, 03/07/24, 2:56 PM EST.  Portions of this documentation were transcribed electronically from a voice recognition software.  I confirm all data accurately represents the service(s) I performed at today's visit.

## 2024-03-07 NOTE — THERAPY TREATMENT NOTE
Trauma / Surgical Daily Progress Note    Date of Service  10/29/2022    Chief Complaint  64 y.o. male admitted 10/13/2022 with Trauma, hiking and fall, spinal fracture, facial fxs, bilateral wrists fxs    10/17 ORIF intraarticular distal radius fracture. ORIF left intraarticular distal radius fracture. ORIF ulnar neck fracture.     10/18 ORIF bilateral [place] fractures through multiple surgical approaches including interdental fixation.    Interval Events  No acute overnight events.   Patient has no complaints.   Ambulating halls.   Hyponatremia.     - Encourage oral hydration and Gatorade.   - Patient remains medically cleared for transfer to post acute services.   - Discharge pending acceptance to SNF through [facility].     Review of Systems  Review of Systems   Constitutional:  Negative for chills and fever.   Respiratory:  Negative for cough and shortness of breath.    Cardiovascular:  Negative for chest pain.   Gastrointestinal:  Negative for abdominal pain, nausea and vomiting.        BM 10/27   Genitourinary:         Voiding   Musculoskeletal:  Positive for neck pain. Negative for myalgias.        Bilateral wrist pain      Vital Signs  Temp:  [36.2 °C (97.1 °F)-36.6 °C (97.8 °F)] 36.2 °C (97.2 °F)  Pulse:  [] 72  Resp:  [16-17] 17  BP: (127-139)/(74-89) 136/76  SpO2:  [94 %-97 %] 97 %    Physical Exam  Physical Exam  Vitals and nursing note reviewed.   Constitutional:       Appearance: Normal appearance.      Interventions: Cervical collar in place.   HENT:      Head: Normocephalic.      Comments: Healing ecchymosis and abrasions/lacerations over face     Mouth/Throat:      Mouth: Mucous membranes are moist.   Eyes:      Extraocular Movements: Extraocular movements intact.      Pupils: Pupils are equal, round, and reactive to light.   Neck:      Comments: Small area of erythema and skin irritation at left lateral base of c-collar. Skin is intact  Cardiovascular:      Rate and Rhythm: Normal rate and  Acute Care - Speech Language Pathology   Swallow Treatment Note  Nate     Patient Name: Nelson Wang  : 1945  MRN: [id number]  Today's Date: 3/7/2024               Admit Date: 2024    Visit Dx:     ICD-10-CM ICD-9-CM   1. Acute respiratory failure with hypoxia  J96.01 518.81   2. Multifocal pneumonia  J18.9 486   3. Difficulty in walking  R26.2 719.7   4. Decreased activities of daily living (ADL)  Z78.9 V49.89   5. Oropharyngeal dysphagia  R13.12 787.22   6. Aspiration pneumonitis  J69.0 507.0     Patient Active Problem List   Diagnosis    Essential hypertension    Bradycardia, sinus    Anemia due to chronic kidney disease    Hypothyroidism    Idiopathic gout    Proteinuria    Psoriasis    Thrombocytopenia    Type 2 diabetes mellitus without complication    CKD (chronic kidney disease), stage IV    Hyperlipidemia    Monoclonal gammopathy of unknown significance (MGUS)    Stage 5 chronic kidney disease    Peritoneal dialysis catheter in place    Chronic gout without tophus    Peritoneal dialysis catheter dysfunction    Medicare annual wellness visit, subsequent    Chronic cough    Peritonitis    HCAP (healthcare-associated pneumonia)    Acute on chronic respiratory failure with hypoxia    ESRD (end stage renal disease)    Aspiration pneumonitis     Past Medical History:   Diagnosis Date    Anemia     Ankle pain, right     CKD (chronic kidney disease), stage IV     Diabetes     Gout     High cholesterol     HL (hearing loss)     Hyperkalemia     Hypertension     Hypothyroidism     Psoriasis     Vitiligo      Past Surgical History:   Procedure Laterality Date    ANKLE SURGERY  1990    APPENDECTOMY N/A     BRONCHOSCOPY N/A 3/1/2024    Procedure: BRONCHOSCOPY WITH BAL AND WASHINGS;  Surgeon: Sandra Espinal MD;  Location: [facility] ENDOSCOPY;  Service: Pulmonary;  Laterality: N/A;  PNEUMONIA    COLONOSCOPY N/A 2022    [facility]    HIP BIPOLAR REPLACEMENT Right 2000    INSERTION PERITONEAL  DIALYSIS CATHETER N/A 3/27/2023    Procedure: LAPAROSCOPIC INSERTION PERITONEAL DIALYSIS CATHETER, LAPAROSCOPIC OMENTOPEXY WITH LYSIS OF ADHESIONS;  Surgeon: Jose Berry MD;  Location: [facility] OR;  Service: General;  Laterality: N/A;    INSERTION PERITONEAL DIALYSIS CATHETER Left 7/23/2023    Procedure: REVISION OF PERITONEAL DIALYSIS CATHETER;  Surgeon: Radha Oreilly MD;  Location: [facility] MAIN OR;  Service: General;  Laterality: Left;    RENAL BIOPSY Left 07/15/2022       SPEECH PATHOLOGY DYSPHAGIA TREATMENT     Subjective/Behavioral Observations: Alert and cooperative, confusion noted, patient hard of hearing.        Day/time of Treatment: 3/7/2024        Current Diet: Soft to chew, nectar thick liquid        Current Strategies:Alternate small bites and small sips of solids and liquids at a slow rate.  Small single sips of liquid with no straw.  Medications whole in applesauce. Reflux precautions.         Treatment received: Dysphagia therapy to address swallow function through exercises and education of strategies.        Results of treatment: Trials of thin water.  Patient with p.o. intake 120 cc of thin liquid with patient utilizing strategies with minimal cueing.  Patient does not acknowledge understanding of need for strategies.  Cough noted x 2.  Patient frequently noted with belching.      Progress toward goals: Adequate.  Patient awake and alert assisting to feed self.        Barriers to Achieving goals: Medical status        Plan of care:/changes in plan: Continue per plan, 1.  Diet: Regular soft moist solids, nectar thickened liquid.  No straw.  2.  Positioning fully upright for all p.o. intake and 30 minutes following.  3.  Alternate small bites and small sips of solids and liquids at a slow rate.  Small single sips of liquid with no straw.  Medications whole in applesauce. Reflux precautions.                                                                                 Plan of  regular rhythm.      Heart sounds: Normal heart sounds.   Pulmonary:      Effort: Pulmonary effort is normal. No respiratory distress.      Breath sounds: Normal breath sounds.   Abdominal:      General: There is no distension.      Palpations: Abdomen is soft.      Tenderness: There is no abdominal tenderness.   Genitourinary:     Comments: Condom catheter in place  Musculoskeletal:      Comments: Moves all extremities   BUE long arm splints   Skin:     General: Skin is warm and dry.   Neurological:      Mental Status: He is alert and oriented to person, place, and time.   Psychiatric:         Behavior: Behavior normal.       Laboratory  No results found for this or any previous visit (from the past 24 hour(s)).    Fluids    Intake/Output Summary (Last 24 hours) at 10/29/2022 1056  Last data filed at 10/29/2022 1000  Gross per 24 hour   Intake 600 ml   Output --   Net 600 ml       Core Measures & Quality Metrics  Medications reviewed, Labs reviewed and Radiology images reviewed  Barajas catheter: No Barajas      DVT Prophylaxis: Enoxaparin (Lovenox)  DVT prophylaxis - mechanical: SCDs  Ulcer prophylaxis: Not indicated    Assessed for rehab: Patient was assess for and/or received rehabilitation services during this hospitalization  RAP Score Total: 5  CAGE Results: negative Blood Alcohol>0.08: no     Assessment/Plan  Discharge planning issues- (present on admission)  Assessment & Plan  Date of admission: 10/13/2022.  10/18 PM&R consulted, patient declined due to [employer] insurance.   10/20 Case management assisting with placement in the [place] area.  10/28 Acceptance to [facility] pending insurance.   Cleared for discharge: Yes - Date: 10/25.  Discharge delayed: Yes - Reason: Non-availability of Transfer Facility.  Discharge date: tbd.    Traumatic closed displaced fracture of distal end of radius and ulna, left, initial encounter- (present on admission)  Assessment & Plan  Comminuted intra-articular distal  Care Reviewed With: patient, spouse          EDUCATION  The patient has been educated in the following areas:   Modified Diet Instruction.              Time Calculation:    Time Calculation- SLP       Row Name 03/07/24 1208             Time Calculation- SLP    SLP Stop Time 1130  -TB      SLP Received On 03/07/24  -TB         Untimed Charges    16925-PH Treatment Swallow Minutes 40  -TB         Total Minutes    Untimed Charges Total Minutes 40  -TB       Total Minutes 40  -TB                User Key  (r) = Recorded By, (t) = Taken By, (c) = Cosigned By      Initials Name Provider Type    TB Katie Isaacs SLP Speech and Language Pathologist                    Therapy Charges for Today       Code Description Service Date Service Provider Modifiers Qty    17737255249 HC ST TREATMENT SWALLOW 3 3/7/2024 Katie Isaacs SLP GN 1                 ADILSON Álvarez  3/7/2024   radius fracture on left.    Significant displacement of large radial sided fracture fragment.  Comminuted fracture of distal ulna.  Splinted at referring facility.  Reduced in ED with conscious sedation.  CT bilateral wrists completed.  10/17 ORIF distal radius.  Weight bearing status - Platform weightbearing LUE. ADL's ok with hands under 5 lbs.  Sutures/Staples out 14 days post operatively (10/31).  Te Comer MD. Orthopedic Surgeon. [facility]. and Deyvi Ocampo MD. Orthopedic Surgeon. [facility].      Closed fracture distal radius and ulna, right, initial encounter- (present on admission)  Assessment & Plan  Comminuted dorsally angulated intra-articular distal radius fracture with impaction.    Mildly distracted fracture of the base of the ulnar styloid.  Splinted at referring facility.  Reduced in ED with conscious sedation.  CT bilateral wrists completed.  10/18 ORIF distal radius.  Weight bearing status - Platform weightbearing RUE. ADL's ok with hands under 5 lbs.  Sutures/Staples out 14 days post operatively (11/1).  Te Comer MD. Orthopedic Surgeon. [facility].      Closed fracture of third cervical vertebra, initial encounter (HCC)- (present on admission)  Assessment & Plan  Fracture of the anterior inferior corner of the C3 vertebral body with slight anterior displacement approximately 3 mm.  Equivocal nondisplaced fracture line extending to superior endplate of the vertebral body.  Left C3-4 facet mildly widened with posterior displacement of C3 facet in relation to C4.  CTA neck within normal limits.  Non-operative management.   Cervical immobilization.   Follow up in clinic 6 weeks with AP / lateral cervical spine xrays.  Garrett Edgar MD, PhD. Neurosurgeon. [facility].        Multiple facial fractures, closed, initial encounter (HCC)- (present on admission)  Assessment & Plan  Fractures of the anterior, medial and superior maxillary sinuses  bilaterally, fracture of the right lateral maxillary sinus, bilateral nasal fractures.  Comminuted frontal sinus fracture which appears to involve superior orbital walls bilaterally.    Comminuted displaced fracture of the right superior medial orbit, no retro-orbital hematoma.  Bilateral pterygoid plate fractures.  LeForte III type fracture.  10/18 ORIF bilateral [place] fractures through multiple surgical approaches including interdental fixation.  Huey Ahuja MD. Plastic Surgeon. [facility].    No contraindication to deep vein thrombosis (DVT) prophylaxis- (present on admission)  Assessment & Plan  Prophylactic anticoagulation for thrombotic prevention initially contraindicated secondary to elevated bleeding risk.  10/14 Prophylactic dose enoxaparin initiated.       Trauma- (present on admission)  Assessment & Plan  Fall while hiking  Trauma Green Transfer Activation.  Marck Marquez MD. Trauma Surgery.       Discussed patient condition with RN, , Patient, and trauma surgery. Dr. Eddie Egan.

## 2024-03-07 NOTE — PROGRESS NOTES
" [facility]     Nephrology Progress Note      Patient Name: Nelson Wang  : 1945  MRN: [id number]  Primary Care Physician:  Janna Mo MD  Date of admission: 2024    Subjective   Subjective     Interval History:  PT awake and alert today.  Sitting in bed, persistent cough, unable to bring up phlegm.  No issues with peritoneal dialysis.      Objective   Objective     Vitals:   Temp:  [97.3 °F (36.3 °C)-98.7 °F (37.1 °C)] 97.3 °F (36.3 °C)  Heart Rate:  [83-89] 84  Resp:  [18] 18  BP: ()/(34-79) 137/79  Flow (L/min):  [1-2] 2  Physical Exam:   Constitutional: Alert, sitting in bed, persistent cough.   Eyes: sclerae anicteric, no conjunctival injection   HENT: mucous membranes moist   Neck: Supple, no thyromegaly, no lymphadenopathy, trachea midline, full EJ   Respiratory: Decreased with scattered crackles bilaterally, nonlabored respirations, upper airway rhonchi.   Cardiovascular: RRR, no murmurs, rubs, or gallops.   Gastrointestinal: Positive bowel sounds, soft, nontender, nondistended, PD catheter exit site is covered.   Musculoskeletal: No edema, no clubbing or cyanosis   Psychiatric: \"Reparative   Neurologic: Awake, alert, moving extremities.   Skin: warm and dry, no rashes     Result Review    Result Reviewed:  I have personally reviewed the results from the time of this admission to 3/7/2024 09:49 EST and agree with these findings:  [x]  Laboratory  []  Microbiology  [x]  Radiology  []  EKG/Telemetry   []  Cardiology/Vascular   []  Pathology  []  Old records  []  Other:        Lab 24  0746 24  0522 24  0415 24  0543 24  0255 24  1158 24  0527 24  0403   SODIUM 143 142 140  --  140 139 138 136   SODIUM, ARTERIAL  --   --   --  137.6  --   --   --   --    POTASSIUM 3.4* 3.2* 4.0  --  3.6 3.3* 3.5 3.9   CHLORIDE 103 104 101  --  102 102 102 100   CO2 28.5 26.7 23.6  --  25.3 24.2 26.0 25.1   BUN 29* 35* 32*  --  34* 35* 35* 33* "   CREATININE 6.28* 6.87* 6.11*  --  5.93* 6.36* 7.02* 6.40*   GLUCOSE 205* 196* 200*  --  209* 151* 142* 170*   GLUCOSE, ARTERIAL  --   --   --  140*  --   --   --   --    EGFR 8.5* 7.6* 8.8*  --  9.1* 8.4* 7.4* 8.3*   ANION GAP 11.5 11.3 15.4*  --  12.7 12.8 10.0 10.9   MAGNESIUM 2.0 1.5*  --   --  1.7  --  1.6  --    PHOSPHORUS 2.7 3.5  --   --  4.9*  --  5.3*  --            Assessment & Plan   Assessment / Plan       Active Hospital Problems:  Active Hospital Problems    Diagnosis     **Acute on chronic respiratory failure with hypoxia     HCAP (healthcare-associated pneumonia)     ESRD (end stage renal disease)     Aspiration pneumonitis     Peritoneal dialysis catheter in place     Essential hypertension     Anemia due to chronic kidney disease     Type 2 diabetes mellitus without complication        Assessment and Plan:    - End-stage renal disease on peritoneal dialysis, with evidence of increased volume, transient hypoxia.  We can continue 2.5% dextrose solution for the night cycler and the 1.5% midday exchange today.   Continue gentamicin 0.1% cream daily application to PD catheter exit site.  Continue with nystatin swish and swallow for fungal peritonitis prevention while on antibiotics.  - Pneumonia, status post bronchoscopy, per pulmonary.  Antibiotics and steroids currently.  Concerns for silent aspiration.  - Hypertension, blood pressure is acceptable.  - Anemia of chronic kidney disease, hemoglobin better.  On long-acting RONALD as outpatient.  Monitor for now.    - Type 2 diabetes with complications.  -- Hypokalemia: Being replaced.  Added kcl 20meq/day for now.  - Hypomagnesemia, being replaced.  - Hyperphosphatemia, relatively poor p.o. intake, decrease sevelamer to 1 p.o. 3 times daily with meals.

## 2024-03-07 NOTE — SIGNIFICANT NOTE
03/07/24 1300   Coping/Psychosocial   Observed Emotional State calm;cooperative   Verbalized Emotional State relief;hopefulness   Trust Relationship/Rapport empathic listening provided   Family/Support Persons daughter   Involvement in Care interacting with patient   Additional Documentation Spiritual Care (Group)   Spiritual Care   Use of Spiritual Resources non-Restorationist use of spiritual care   Spiritual Care Source  initiative   Spiritual Care Follow-Up follow-up, none required as presently assessed   Response to Spiritual Care receptive of support   Spiritual Care Interventions supportive conversation provided   Spiritual Care Visit Type initial   Receptivity to Spiritual Care visit welcomed        CC:  Trung Garay is here today for Telephonic Visit and Follow-up (Labs)   .    Medications: medications verified, no change  Refills needed today?  NO  Patient would like communication of their results via:   [facility]  Advanced directives: Yes    Health Maintenance Due   Topic Date Due   • Hepatitis B Vaccine (1 of 3 - 3-dose series) Never done   • Breast Cancer Screening  Never done   • Shingles Vaccine (3 of 3) 10/15/2019   • Depression Screening  01/28/2023   • Traditional Medicare- Medicare Wellness Visit  02/03/2023     Patient is due for topics listed above, she wishes to proceed with Depression Screening , but is not proceeding with Immunization(s) Hep B and Shingles and Mammogram and Medicare Wellness Visit at this time.     Recent Review Flowsheet Data     Date 1/28/2022    Adult PHQ 2 Score 0    Adult PHQ 2 Interpretation No further screening needed    Little interest or pleasure in activity? Not at all    Feeling down, depressed or hopeless? Not at all

## 2024-03-07 NOTE — PROGRESS NOTES
Pulmonary / Critical Care Progress Note      Patient Name: Nelson Wang  : 1945  MRN: [id number]  Attending:  Saúl Vargas MD  Date of admission: 2024    Subjective   Subjective   Follow-up for pneumonia    Over the past 24 hours: Remains on Unasyn IV.  Continues Brovana, Pulmicort, and DuoNebs.  Remains on Demadex.  Mentation has been fluctuating.  Psychiatry was consulted.  Was started on Abilify.    No acute events overnight.     This morning,  Somnolent and drowsy.  Currently on supplemental oxygen.    Oxygen saturation in mid 90s.  Denies any chest pain or chest tightness  Denies cough or hemoptysis  No fever or chills    Objective   Objective     Vitals:   Temp:  [97.3 °F (36.3 °C)-98.7 °F (37.1 °C)] 97.3 °F (36.3 °C)  Heart Rate:  [82-89] 84  Resp:  [18-20] 18  BP: ()/(34-79) 137/79  Flow (L/min):  [1-2] 2    Physical Exam   Vital Signs Reviewed   General:  WDWN, Alert, NAD. elderly male, lying in bed  HEENT:  PERRL, EOMI.  OP, nares clear  Chest: Diminished with coarse crackles at bases bilaterally, no work of breathing noted on room air  CV: RRR, no MGR, pulses 2+, equal.  Abd:  Soft, NT, ND, + BS  EXT:  no clubbing, no cyanosis, no edema  Neuro:  A&Ox2, more confused this morning, CN grossly intact, no focal deficits.  Skin: No rashes or lesions noted, multiple wounds noted please see charting    Result Review    Result Review:  I have personally reviewed the results from the time of this admission to 3/7/2024 07:38 EST and agree with these findings:  [x]  Laboratory  [x]  Microbiology  [x]  Radiology  []  EKG/Telemetry   []  Cardiology/Vascular   []  Pathology  []  Old records  []  Other:  Most notable findings include:         Lab 24  0522 24  0415 24  0543 24  0255 24  1158 24  0527 24  0403   WBC 4.52 4.23  --  6.13 6.62 7.45 7.46   HEMOGLOBIN 9.1* 10.2*  --  9.4* 8.8* 8.8* 9.2*   HEMATOCRIT 30.3* 35.3*  --  31.3* 28.4* 29.4* 29.9*    PLATELETS 172 141  --  117* 107* 128* 155   SODIUM 142 140  --  140 139 138 136   SODIUM, ARTERIAL  --   --  137.6  --   --   --   --    POTASSIUM 3.2* 4.0  --  3.6 3.3* 3.5 3.9   CHLORIDE 104 101  --  102 102 102 100   CO2 26.7 23.6  --  25.3 24.2 26.0 25.1   BUN 35* 32*  --  34* 35* 35* 33*   CREATININE 6.87* 6.11*  --  5.93* 6.36* 7.02* 6.40*   GLUCOSE 196* 200*  --  209* 151* 142* 170*   GLUCOSE, ARTERIAL  --   --  140*  --   --   --   --    CALCIUM 8.1* 8.2*  --  8.2* 7.7* 7.9* 8.0*   PHOSPHORUS 3.5  --   --  4.9*  --  5.3*  --    TOTAL PROTEIN 5.7*  --   --   --   --   --   --    ALBUMIN 2.3*  --   --   --   --   --   --      Bronchoscopy BAL negative.  Bronchoscopy note personally reviewed  Assessment & Plan   Assessment / Plan     Active Hospital Problems:  Active Hospital Problems    Diagnosis     **Acute on chronic respiratory failure with hypoxia     HCAP (healthcare-associated pneumonia)     ESRD (end stage renal disease)     Aspiration pneumonitis     Peritoneal dialysis catheter in place     Essential hypertension     Anemia due to chronic kidney disease     Type 2 diabetes mellitus without complication      Impression:    Acute hypoxic respiratory failure requiring supplemental oxygen  Concern for aspiration pneumonia  NSTEMI type I versus type II  ESRD on peritoneal dialysis  Anemia of chronic kidney disease  T2DM with hyperglycemia  Hypomagnesemia       Plan:    -Supplemental oxygen to keep saturation more than 90%.  Continue maintain SpO2 greater than 90%.  We will try to wean him off oxygen today.  -Repeat chest x-ray in the morning.  -Continue Unasyn for total 7 days in the setting of aspiration pneumonia  -Continue Brovana, Pulmicort, DuoNebs  -Continue chest physiotherapy  -Continue bronchopulmonary hygiene.  Encourage I-S and flutter  -Continue to monitor renal panel and electrolytes.  Electrolytes as necessary  -Continue aspiration precautions.  Upright for all p.o. intake  -Speech  pathology following, appreciate input.  -Peritoneal dialysis per nephrology, appreciate input.  -Continue delirium precautions and Seroquel for now.  Psychiatry service on board.  -Follow-up pulmonary clinic 1 to 2 weeks after discharge     DVT prophylaxis:  Medical DVT prophylaxis orders are present.    CODE STATUS:   Level Of Support Discussed With: Patient  Code Status (Patient has no pulse and is not breathing): CPR (Attempt to Resuscitate)  Medical Interventions (Patient has pulse or is breathing): Full Support    I have reviewed labs, imaging, pertinent clinical data and provider notes.   I have discussed with bedside nurse and primary service.     Electronically signed by Preston Jimenez MD, 03/07/24, 7:39 AM EST.

## 2024-03-08 ENCOUNTER — APPOINTMENT (OUTPATIENT)
Dept: GENERAL RADIOLOGY | Facility: HOSPITAL | Age: 79
DRG: 193 | End: 2024-03-08
Payer: MEDICARE

## 2024-03-08 LAB
ALBUMIN SERPL-MCNC: 2.8 G/DL (ref 3.5–5.2)
ALP SERPL-CCNC: 58 U/L (ref 39–117)
ALT SERPL W P-5'-P-CCNC: 16 U/L (ref 1–41)
ANION GAP SERPL CALCULATED.3IONS-SCNC: 11 MMOL/L (ref 5–15)
AST SERPL-CCNC: 26 U/L (ref 1–40)
BASOPHILS # BLD AUTO: 0.09 10*3/MM3 (ref 0–0.2)
BASOPHILS NFR BLD AUTO: 1.7 % (ref 0–1.5)
BILIRUB CONJ SERPL-MCNC: <0.2 MG/DL (ref 0–0.3)
BILIRUB INDIRECT SERPL-MCNC: ABNORMAL MG/DL
BILIRUB SERPL-MCNC: 0.3 MG/DL (ref 0–1.2)
BUN SERPL-MCNC: 29 MG/DL (ref 8–23)
BUN/CREAT SERPL: 4.8 (ref 7–25)
CALCIUM SPEC-SCNC: 9 MG/DL (ref 8.6–10.5)
CHLORIDE SERPL-SCNC: 101 MMOL/L (ref 98–107)
CO2 SERPL-SCNC: 29 MMOL/L (ref 22–29)
CREAT SERPL-MCNC: 6.06 MG/DL (ref 0.76–1.27)
DEPRECATED RDW RBC AUTO: 59.7 FL (ref 37–54)
EGFRCR SERPLBLD CKD-EPI 2021: 8.9 ML/MIN/1.73
EOSINOPHIL # BLD AUTO: 0.25 10*3/MM3 (ref 0–0.4)
EOSINOPHIL NFR BLD AUTO: 4.6 % (ref 0.3–6.2)
ERYTHROCYTE [DISTWIDTH] IN BLOOD BY AUTOMATED COUNT: 16.5 % (ref 12.3–15.4)
FUNGUS WND CULT: NORMAL
GLUCOSE BLDC GLUCOMTR-MCNC: 187 MG/DL (ref 70–99)
GLUCOSE BLDC GLUCOMTR-MCNC: 194 MG/DL (ref 70–99)
GLUCOSE BLDC GLUCOMTR-MCNC: 228 MG/DL (ref 70–99)
GLUCOSE BLDC GLUCOMTR-MCNC: 234 MG/DL (ref 70–99)
GLUCOSE SERPL-MCNC: 252 MG/DL (ref 65–99)
HCT VFR BLD AUTO: 34.8 % (ref 37.5–51)
HGB BLD-MCNC: 10 G/DL (ref 13–17.7)
IMM GRANULOCYTES # BLD AUTO: 0.05 10*3/MM3 (ref 0–0.05)
IMM GRANULOCYTES NFR BLD AUTO: 0.9 % (ref 0–0.5)
LYMPHOCYTES # BLD AUTO: 1.87 10*3/MM3 (ref 0.7–3.1)
LYMPHOCYTES NFR BLD AUTO: 34.3 % (ref 19.6–45.3)
MAGNESIUM SERPL-MCNC: 1.8 MG/DL (ref 1.6–2.4)
MCH RBC QN AUTO: 28.7 PG (ref 26.6–33)
MCHC RBC AUTO-ENTMCNC: 28.7 G/DL (ref 31.5–35.7)
MCV RBC AUTO: 99.7 FL (ref 79–97)
MONOCYTES # BLD AUTO: 0.88 10*3/MM3 (ref 0.1–0.9)
MONOCYTES NFR BLD AUTO: 16.1 % (ref 5–12)
MYCOBACTERIUM SPEC CULT: NORMAL
NEUTROPHILS NFR BLD AUTO: 2.31 10*3/MM3 (ref 1.7–7)
NEUTROPHILS NFR BLD AUTO: 42.4 % (ref 42.7–76)
NIGHT BLUE STAIN TISS: NORMAL
NIGHT BLUE STAIN TISS: NORMAL
NRBC BLD AUTO-RTO: 0 /100 WBC (ref 0–0.2)
PHOSPHATE SERPL-MCNC: 2.6 MG/DL (ref 2.5–4.5)
PLATELET # BLD AUTO: 193 10*3/MM3 (ref 140–450)
PMV BLD AUTO: 11.3 FL (ref 6–12)
POTASSIUM SERPL-SCNC: 3.8 MMOL/L (ref 3.5–5.2)
PROCALCITONIN SERPL-MCNC: 0.46 NG/ML (ref 0–0.25)
PROT SERPL-MCNC: 6.3 G/DL (ref 6–8.5)
RBC # BLD AUTO: 3.49 10*6/MM3 (ref 4.14–5.8)
SODIUM SERPL-SCNC: 141 MMOL/L (ref 136–145)
WBC NRBC COR # BLD AUTO: 5.45 10*3/MM3 (ref 3.4–10.8)

## 2024-03-08 PROCEDURE — 97161 PT EVAL LOW COMPLEX 20 MIN: CPT

## 2024-03-08 PROCEDURE — 94799 UNLISTED PULMONARY SVC/PX: CPT

## 2024-03-08 PROCEDURE — 71045 X-RAY EXAM CHEST 1 VIEW: CPT

## 2024-03-08 PROCEDURE — 84100 ASSAY OF PHOSPHORUS: CPT | Performed by: FAMILY MEDICINE

## 2024-03-08 PROCEDURE — 94664 DEMO&/EVAL PT USE INHALER: CPT

## 2024-03-08 PROCEDURE — 80048 BASIC METABOLIC PNL TOTAL CA: CPT | Performed by: FAMILY MEDICINE

## 2024-03-08 PROCEDURE — 25010000002 HEPARIN (PORCINE) PER 1000 UNITS: Performed by: INTERNAL MEDICINE

## 2024-03-08 PROCEDURE — 99233 SBSQ HOSP IP/OBS HIGH 50: CPT | Performed by: INTERNAL MEDICINE

## 2024-03-08 PROCEDURE — 82948 REAGENT STRIP/BLOOD GLUCOSE: CPT | Performed by: STUDENT IN AN ORGANIZED HEALTH CARE EDUCATION/TRAINING PROGRAM

## 2024-03-08 PROCEDURE — 63710000001 INSULIN REGULAR HUMAN PER 5 UNITS: Performed by: FAMILY MEDICINE

## 2024-03-08 PROCEDURE — 80076 HEPATIC FUNCTION PANEL: CPT | Performed by: FAMILY MEDICINE

## 2024-03-08 PROCEDURE — 94761 N-INVAS EAR/PLS OXIMETRY MLT: CPT

## 2024-03-08 PROCEDURE — 84145 PROCALCITONIN (PCT): CPT | Performed by: INTERNAL MEDICINE

## 2024-03-08 PROCEDURE — 97165 OT EVAL LOW COMPLEX 30 MIN: CPT

## 2024-03-08 PROCEDURE — 82948 REAGENT STRIP/BLOOD GLUCOSE: CPT

## 2024-03-08 PROCEDURE — 83735 ASSAY OF MAGNESIUM: CPT | Performed by: FAMILY MEDICINE

## 2024-03-08 PROCEDURE — 3E1M39Z IRRIGATION OF PERITONEAL CAVITY USING DIALYSATE, PERCUTANEOUS APPROACH: ICD-10-PCS | Performed by: INTERNAL MEDICINE

## 2024-03-08 PROCEDURE — 25010000002 AMPICILLIN-SULBACTAM PER 1.5 G: Performed by: INTERNAL MEDICINE

## 2024-03-08 PROCEDURE — 94669 MECHANICAL CHEST WALL OSCILL: CPT

## 2024-03-08 PROCEDURE — 85025 COMPLETE CBC W/AUTO DIFF WBC: CPT | Performed by: FAMILY MEDICINE

## 2024-03-08 RX ORDER — LORAZEPAM 2 MG/ML
0.25 INJECTION INTRAMUSCULAR EVERY 6 HOURS PRN
Status: DISCONTINUED | OUTPATIENT
Start: 2024-03-08 | End: 2024-03-08

## 2024-03-08 RX ORDER — LORAZEPAM 2 MG/ML
0.5 INJECTION INTRAMUSCULAR EVERY 6 HOURS PRN
Status: DISCONTINUED | OUTPATIENT
Start: 2024-03-08 | End: 2024-03-09

## 2024-03-08 RX ADMIN — HEPARIN SODIUM 5000 UNITS: 5000 INJECTION INTRAVENOUS; SUBCUTANEOUS at 22:00

## 2024-03-08 RX ADMIN — Medication 10 ML: at 08:31

## 2024-03-08 RX ADMIN — Medication 250 MG: at 20:16

## 2024-03-08 RX ADMIN — HEPARIN SODIUM 5000 UNITS: 5000 INJECTION INTRAVENOUS; SUBCUTANEOUS at 14:46

## 2024-03-08 RX ADMIN — BUDESONIDE 0.5 MG: 0.5 INHALANT RESPIRATORY (INHALATION) at 19:43

## 2024-03-08 RX ADMIN — ASPIRIN 81 MG: 81 TABLET, COATED ORAL at 08:31

## 2024-03-08 RX ADMIN — NYSTATIN 500000 UNITS: 100000 SUSPENSION ORAL at 17:15

## 2024-03-08 RX ADMIN — INSULIN HUMAN 2 UNITS: 100 INJECTION, SOLUTION PARENTERAL at 12:30

## 2024-03-08 RX ADMIN — POTASSIUM CHLORIDE 20 MEQ: 1500 TABLET, EXTENDED RELEASE ORAL at 08:31

## 2024-03-08 RX ADMIN — INSULIN HUMAN 4 UNITS: 100 INJECTION, SOLUTION PARENTERAL at 20:43

## 2024-03-08 RX ADMIN — INSULIN HUMAN 4 UNITS: 100 INJECTION, SOLUTION PARENTERAL at 08:31

## 2024-03-08 RX ADMIN — AMPICILLIN AND SULBACTAM 3 G: 2; 1 INJECTION, POWDER, FOR SOLUTION INTRAVENOUS at 08:31

## 2024-03-08 RX ADMIN — INSULIN HUMAN 2 UNITS: 100 INJECTION, SOLUTION PARENTERAL at 17:16

## 2024-03-08 RX ADMIN — TORSEMIDE 100 MG: 20 TABLET ORAL at 08:30

## 2024-03-08 RX ADMIN — Medication 250 MG: at 08:31

## 2024-03-08 RX ADMIN — ARFORMOTEROL TARTRATE 15 MCG: 15 SOLUTION RESPIRATORY (INHALATION) at 19:43

## 2024-03-08 RX ADMIN — BENZONATATE 100 MG: 100 CAPSULE ORAL at 12:32

## 2024-03-08 RX ADMIN — NYSTATIN 500000 UNITS: 100000 SUSPENSION ORAL at 12:30

## 2024-03-08 RX ADMIN — BUDESONIDE 0.5 MG: 0.5 INHALANT RESPIRATORY (INHALATION) at 07:42

## 2024-03-08 RX ADMIN — Medication 5 MG: at 20:16

## 2024-03-08 RX ADMIN — ARIPIPRAZOLE 2 MG: 2 TABLET ORAL at 20:16

## 2024-03-08 RX ADMIN — ATORVASTATIN CALCIUM 40 MG: 40 TABLET, FILM COATED ORAL at 08:31

## 2024-03-08 RX ADMIN — FAMOTIDINE 20 MG: 20 TABLET ORAL at 20:16

## 2024-03-08 RX ADMIN — SEVELAMER CARBONATE 800 MG: 800 TABLET, FILM COATED ORAL at 17:15

## 2024-03-08 RX ADMIN — ARFORMOTEROL TARTRATE 15 MCG: 15 SOLUTION RESPIRATORY (INHALATION) at 07:42

## 2024-03-08 RX ADMIN — Medication 10 ML: at 20:16

## 2024-03-08 RX ADMIN — GENTAMICIN SULFATE 1 APPLICATION: 1 CREAM TOPICAL at 08:32

## 2024-03-08 RX ADMIN — SEVELAMER CARBONATE 800 MG: 800 TABLET, FILM COATED ORAL at 08:30

## 2024-03-08 RX ADMIN — NYSTATIN 500000 UNITS: 100000 SUSPENSION ORAL at 08:30

## 2024-03-08 RX ADMIN — SODIUM CHLORIDE, SODIUM LACTATE, CALCIUM CHLORIDE, MAGNESIUM CHLORIDE AND DEXTROSE 6000 ML: 1.5; 538; 448; 18.3; 5.08 INJECTION, SOLUTION INTRAPERITONEAL at 20:16

## 2024-03-08 RX ADMIN — LORAZEPAM 0.25 MG: 0.5 TABLET ORAL at 20:33

## 2024-03-08 RX ADMIN — NYSTATIN 500000 UNITS: 100000 SUSPENSION ORAL at 20:33

## 2024-03-08 NOTE — PROGRESS NOTES
" [facility]     Psychiatric Progress Note    Patient Name: Nelson Wang  : 1945  MRN: [id number]  Primary Care Physician:  Janna Mo MD  Date of admission: 2024    Subjective   Subjective     Patient seen and chart reviewed, discussed with staff.    Chief Complaint: Hypomania, confusion      HPI:     Patient sitting up in chair.  He is pleasant cooperative.  Recalls meeting me the other day.  Is able to discuss his care in appropriate fashion.    States that he is sleeping much better, feels that he is not tired from getting aripiprazole in the morning.  Would prefer to take it at nighttime.  We will move from aripiprazole to at bedtime dosing and should get a dose tonight.    Reports his mood is calm.  He is engaging cooperative.  Is able to state treatment plan.      Objective   Objective     Vitals:   Temp:  [97.7 °F (36.5 °C)-98.1 °F (36.7 °C)] 97.7 °F (36.5 °C)  Heart Rate:  [84-93] 85  Resp:  [16-18] 18  BP: (127-151)/(53-69) 138/69  Flow (L/min):  [2] 2          Mental Status Exam:      Appearance:   Well-developed, well-nourished, calm, cooperative, sitting up in chair at bedside  Reliability:   Good  Eye Contact:   Good  Concentration/Focus:    Attentive to the interview  Behaviors:    No restlessness or agitation  Memory :    Sensorium intact  Speech:    Normal rate and volume, spontaneous  Language:   Appropriate, relevant  Mood : \"Calm\"   Affect:    Euthymic  Thought process:    Goal directed, linear  Thought Content:    Denies suicidal or homicidal ideation, no hallucinations   Insight:   Good  Judgement:    Intact, no behavioral disturbance      Result Review    Result Review:  I have personally reviewed the results from the time of this admission to 3/8/2024 12:57 EST and agree with these findings:  []  Laboratory  []  Microbiology  []  Radiology  []  EKG/Telemetry   []  Cardiology/Vascular   []  Pathology  []  Old records  []  Other:  Most notable findings include: " "    Lab Results (last 24 hours)       Procedure Component Value Units Date/Time    POC Glucose 4x Daily Before Meals & at Bedtime [[id number]]  (Abnormal) Collected: 03/08/24 1108    Specimen: Blood Updated: 03/08/24 1109     Glucose 187 mg/dL      Comment: Serial Number: [id number]:  [id number]       Fungus Culture - Lavage, Lung, Right Lower Lobe [[id number]] Collected: 03/01/24 0922    Specimen: Lavage from Lung, Right Lower Lobe Updated: 03/08/24 1015     Fungus Culture No fungus isolated at 1 week    AFB Culture - Lavage, Lung, Right Lower Lobe [[id number]] Collected: 03/01/24 0922    Specimen: Lavage from Lung, Right Lower Lobe Updated: 03/08/24 1015     AFB Culture No AFB isolated at 1 week     AFB Stain No acid fast bacilli seen on direct smear      No acid fast bacilli seen on concentrated smear    Procalcitonin [[id number]]  (Abnormal) Collected: 03/08/24 0429    Specimen: Blood from Arm, Left Updated: 03/08/24 0910     Procalcitonin 0.46 ng/mL     Narrative:      As a Marker for Sepsis (Non-Neonates):    1. <0.5 ng/mL represents a low risk of severe sepsis and/or septic shock.  2. >2 ng/mL represents a high risk of severe sepsis and/or septic shock.    As a Marker for Lower Respiratory Tract Infections that require antibiotic therapy:    PCT on Admission    Antibiotic Therapy       6-12 Hrs later    >0.5                Strongly Recommended  >0.25 - <0.5        Recommended  0.1 - 0.25          Discouraged              Remeasure/reassess PCT  <0.1                Strongly Discouraged     Remeasure/reassess PCT    As 28 day mortality risk marker: \"Change in Procalcitonin Result\" (>80% or <=80%) if Day 0 (or Day 1) and Day 4 values are available. Refer to http://www.[facility]-pct-calculator.com    Change in PCT <=80%  A decrease of PCT levels below or equal to 80% defines a positive change in PCT test result representing a higher risk for 28-day all-cause mortality of patients diagnosed with severe sepsis for " septic shock.    Change in PCT >80%  A decrease of PCT levels of more than 80% defines a negative change in PCT result representing a lower risk for 28-day all-cause mortality of patients diagnosed with severe sepsis or septic shock.    This test is Prognostic not Diagnostic, if elevated correlate with clinical findings before administering antibiotic treatment.        POC Glucose 4x Daily Before Meals & at Bedtime [[id number]]  (Abnormal) Collected: 03/08/24 0806    Specimen: Blood Updated: 03/08/24 0808     Glucose 228 mg/dL      Comment: Serial Number: [id number]:  [id number]       Basic Metabolic Panel [[id number]]  (Abnormal) Collected: 03/08/24 0429    Specimen: Blood from Arm, Left Updated: 03/08/24 0517     Glucose 252 mg/dL      BUN 29 mg/dL      Creatinine 6.06 mg/dL      Sodium 141 mmol/L      Potassium 3.8 mmol/L      Chloride 101 mmol/L      CO2 29.0 mmol/L      Calcium 9.0 mg/dL      BUN/Creatinine Ratio 4.8     Anion Gap 11.0 mmol/L      eGFR 8.9 mL/min/1.73      Comment: <15 Indicative of kidney failure       Narrative:      GFR Normal >60  Chronic Kidney Disease <60  Kidney Failure <15    The GFR formula is only valid for adults with stable renal function between ages 18 and 70.    Magnesium [[id number]]  (Normal) Collected: 03/08/24 0429    Specimen: Blood from Arm, Left Updated: 03/08/24 0517     Magnesium 1.8 mg/dL     Phosphorus [[id number]]  (Normal) Collected: 03/08/24 0429    Specimen: Blood from Arm, Left Updated: 03/08/24 0517     Phosphorus 2.6 mg/dL     Hepatic Function Panel [[id number]]  (Abnormal) Collected: 03/08/24 0429    Specimen: Blood from Arm, Left Updated: 03/08/24 0517     Total Protein 6.3 g/dL      Albumin 2.8 g/dL      ALT (SGPT) 16 U/L      AST (SGOT) 26 U/L      Alkaline Phosphatase 58 U/L      Total Bilirubin 0.3 mg/dL      Bilirubin, Direct <0.2 mg/dL      Bilirubin, Indirect --     Comment: Unable to calculate       CBC & Differential [[id number]]  (Abnormal)  Collected: 03/08/24 0429    Specimen: Blood from Arm, Left Updated: 03/08/24 0448    Narrative:      The following orders were created for panel order CBC & Differential.  Procedure                               Abnormality         Status                     ---------                               -----------         ------                     CBC Auto Differential[[id number]]        Abnormal            Final result                 Please view results for these tests on the individual orders.    CBC Auto Differential [[id number]]  (Abnormal) Collected: 03/08/24 0429    Specimen: Blood from Arm, Left Updated: 03/08/24 0448     WBC 5.45 10*3/mm3      RBC 3.49 10*6/mm3      Hemoglobin 10.0 g/dL      Hematocrit 34.8 %      MCV 99.7 fL      MCH 28.7 pg      MCHC 28.7 g/dL      RDW 16.5 %      RDW-SD 59.7 fl      MPV 11.3 fL      Platelets 193 10*3/mm3      Neutrophil % 42.4 %      Lymphocyte % 34.3 %      Monocyte % 16.1 %      Eosinophil % 4.6 %      Basophil % 1.7 %      Immature Grans % 0.9 %      Neutrophils, Absolute 2.31 10*3/mm3      Lymphocytes, Absolute 1.87 10*3/mm3      Monocytes, Absolute 0.88 10*3/mm3      Eosinophils, Absolute 0.25 10*3/mm3      Basophils, Absolute 0.09 10*3/mm3      Immature Grans, Absolute 0.05 10*3/mm3      nRBC 0.0 /100 WBC     POC Glucose Once [[id number]]  (Abnormal) Collected: 03/07/24 2152    Specimen: Blood Updated: 03/07/24 2154     Glucose 178 mg/dL      Comment: Serial Number: [id number]:  [id number]       POC Glucose Once [[id number]]  (Abnormal) Collected: 03/07/24 1728    Specimen: Blood Updated: 03/07/24 1732     Glucose 203 mg/dL      Comment: Serial Number: [id number]:  [id number]       Virus Culture - Lavage, Lung, Right Lower Lobe [[id number]] Collected: 03/01/24 0922    Specimen: Lavage from Lung, Right Lower Lobe Updated: 03/07/24 1410     Viral Culture, General Comment     Comment: Preliminary Report:  No virus isolated at 4 days.  Next report to follow after 7  days.       Narrative:      Performed at:  [address], [place], [place]  [id number]  : Maricarmen Mensah MD, Phone:  [phone]                Medications:   !Patient Home Medications Stored on Unit, , Does not apply, BID  arformoterol, 15 mcg, Nebulization, BID - RT  ARIPiprazole, 2 mg, Oral, Nightly  aspirin, 81 mg, Oral, Daily  atorvastatin, 40 mg, Oral, Daily  budesonide, 0.5 mg, Nebulization, BID - RT  famotidine, 20 mg, Oral, Nightly  gentamicin, 1 Application, Topical, Daily  heparin (porcine), 5,000 Units, Subcutaneous, Q8H  dianeal lo-peter 1.5%, 6,000 mL, Intraperitoneal, Daily  insulin regular, 2-9 Units, Subcutaneous, 4x Daily AC & at Bedtime  levothyroxine, 150 mcg, Oral, Q AM  melatonin, 5 mg, Oral, Nightly  nystatin, 5 mL, Swish & Swallow, 4x Daily  pantoprazole, 40 mg, Oral, Q AM  potassium chloride, 20 mEq, Oral, Daily  saccharomyces boulardii, 250 mg, Oral, BID  sevelamer, 800 mg, Oral, BID With Meals  sodium chloride, 10 mL, Intravenous, Q12H  torsemide, 100 mg, Oral, Daily          Assessment / Plan       Active Hospital Problems:  Active Hospital Problems    Diagnosis     **Acute on chronic respiratory failure with hypoxia     HCAP (healthcare-associated pneumonia)     ESRD (end stage renal disease)     Aspiration pneumonitis     Peritoneal dialysis catheter in place     Essential hypertension     Anemia due to chronic kidney disease     Type 2 diabetes mellitus without complication        Plan:     Move aripiprazole to bedtime dosing  Continue as needed lorazepam for insomnia  No need for acute inpatient psych and may discharge to rehab      Disposition:   Part of this note may be an electronic transcription/translation of spoken language to printed text using the Dragon dictation system.         Electronically signed by Emeka Vines MD, 03/08/24, 12:57 PM EST.

## 2024-03-08 NOTE — THERAPY EVALUATION
Acute Care - Physical Therapy Initial Evaluation   Nate     Patient Name: Nelson Wang  : 1945  MRN: [id number]  Today's Date: 3/8/2024      Visit Dx:     ICD-10-CM ICD-9-CM   1. Acute respiratory failure with hypoxia  J96.01 518.81   2. Multifocal pneumonia  J18.9 486   3. Difficulty in walking  R26.2 719.7   4. Decreased activities of daily living (ADL)  Z78.9 V49.89   5. Oropharyngeal dysphagia  R13.12 787.22   6. Aspiration pneumonitis  J69.0 507.0     Patient Active Problem List   Diagnosis    Essential hypertension    Bradycardia, sinus    Anemia due to chronic kidney disease    Hypothyroidism    Idiopathic gout    Proteinuria    Psoriasis    Thrombocytopenia    Type 2 diabetes mellitus without complication    CKD (chronic kidney disease), stage IV    Hyperlipidemia    Monoclonal gammopathy of unknown significance (MGUS)    Stage 5 chronic kidney disease    Peritoneal dialysis catheter in place    Chronic gout without tophus    Peritoneal dialysis catheter dysfunction    Medicare annual wellness visit, subsequent    Chronic cough    Peritonitis    HCAP (healthcare-associated pneumonia)    Acute on chronic respiratory failure with hypoxia    ESRD (end stage renal disease)    Aspiration pneumonitis     Past Medical History:   Diagnosis Date    Anemia     Ankle pain, right     CKD (chronic kidney disease), stage IV     Diabetes     Gout     High cholesterol     HL (hearing loss)     Hyperkalemia     Hypertension     Hypothyroidism     Psoriasis     Vitiligo      Past Surgical History:   Procedure Laterality Date    ANKLE SURGERY  1990    APPENDECTOMY N/A     BRONCHOSCOPY N/A 3/1/2024    Procedure: BRONCHOSCOPY WITH BAL AND WASHINGS;  Surgeon: Sandra Espinal MD;  Location: [facility] ENDOSCOPY;  Service: Pulmonary;  Laterality: N/A;  PNEUMONIA    COLONOSCOPY N/A 2022    [facility]    HIP BIPOLAR REPLACEMENT Right 2000    INSERTION PERITONEAL DIALYSIS CATHETER N/A 3/27/2023    Procedure:  LAPAROSCOPIC INSERTION PERITONEAL DIALYSIS CATHETER, LAPAROSCOPIC OMENTOPEXY WITH LYSIS OF ADHESIONS;  Surgeon: Jose Berry MD;  Location: [facility] MAIN OR;  Service: General;  Laterality: N/A;    INSERTION PERITONEAL DIALYSIS CATHETER Left 7/23/2023    Procedure: REVISION OF PERITONEAL DIALYSIS CATHETER;  Surgeon: Radha Oreilly MD;  Location: [facility] MAIN OR;  Service: General;  Laterality: Left;    RENAL BIOPSY Left 07/15/2022     PT Assessment (Last 12 Hours)       PT Evaluation and Treatment       Row Name 03/08/24 0800          Physical Therapy Time and Intention    Subjective Information no complaints  -CS     Document Type evaluation  -CS     Mode of Treatment individual therapy;physical therapy  -CS     Patient Effort adequate  -CS     Symptoms Noted During/After Treatment none  -CS       Row Name 03/08/24 0800          General Information    Patient Profile Reviewed yes  -CS     Patient Observations alert;cooperative;agree to therapy  -CS     Prior Level of Function independent:;all household mobility;gait;transfer;bed mobility;ADL's  Pt is independent with all functional mobility, transfers, and ADLs.No AD used. He has assist from family if needed. No home O2. Stands in walk-in shower but seat available.  -CS     Equipment Currently Used at Home none  -CS     Existing Precautions/Restrictions no known precautions/restrictions  -CS     Barriers to Rehab none identified  -CS       Row Name 03/08/24 0800          Living Environment    Current Living Arrangements home  -CS     Home Accessibility stairs to enter home;stairs within home  -CS     People in Home child(laura), adult;spouse  -CS     Primary Care Provided by self  -CS       Row Name 03/08/24 0800          Home Main Entrance    Number of Stairs, Main Entrance two  -CS     Stair Railings, Main Entrance none  -CS       Row Name 03/08/24 0800          Pain    Pretreatment Pain Rating 0/10 - no pain  -CS     Posttreatment Pain Rating 0/10 -  no pain  -       Row Name 03/08/24 0800          Cognition    Affect/Mental Status (Cognition) other (see comments)  intermittent confusion; has gotten better  -     Orientation Status (Cognition) oriented to;person;place  -       Row Name 03/08/24 0800          Range of Motion Comprehensive    General Range of Motion bilateral lower extremity ROM WFL  -       Row Name 03/08/24 0800          Strength Comprehensive (MMT)    General Manual Muscle Testing (MMT) Assessment lower extremity strength deficits identified  -CS     Comment, General Manual Muscle Testing (MMT) Assessment BLEs assessed at 4/5  -       Row Name 03/08/24 0800          Bed Mobility    Bed Mobility bed mobility (all) activities  -     All Activities, [place] (Bed Mobility) independent  -     Assistive Device (Bed Mobility) bed rails  -       Row Name 03/08/24 0800          Transfers    Comment, (Transfers) Patient has had supervision for all transfers due to lines/tubes and a safety precaution. He is physically able to complete transfers on his own though without outside assistance and no AD.  -       Row Name 03/08/24 0800          Gait/Stairs (Locomotion)    Comment, (Gait/Stairs) Pt able to ambulate in room, to/from the bathroom, and short distances with supervision and no AD.  -       Row Name 03/08/24 0800          Safety Issues, Functional Mobility    Impairments Affecting Function (Mobility) endurance/activity tolerance  -       Row Name 03/08/24 0800          Balance    Balance Assessment standing dynamic balance  -     Dynamic Standing Balance supervision;standby assist  -     Position/Device Used, Standing Balance unsupported  -       Row Name 03/08/24 0800          Plan of Care Review    Plan of Care Reviewed With patient  -CS     Progress improving  -     Outcome Evaluation Pt presents with no physical limitations or impairments that impede his ability to return home independently or with assist from  his family as needed. Encouraged ambulation as tolerated with supervision from staff or family. May benefit from follow-up of home health services to focus on home safety and endurance in his environment. He will be discharged from PT caseload as no PT services needed while in the hospital.  -CS       Row Name 03/08/24 0800          Positioning and Restraints    Pre-Treatment Position in bed  -CS     Post Treatment Position bed  -CS     In Bed supine;call light within reach;encouraged to call for assist;exit alarm on  with safety watch  -CS       Row Name 03/08/24 0800          Therapy Assessment/Plan (PT)    Criteria for Skilled Interventions Met (PT) no problems identified which require skilled intervention  -CS     Therapy Frequency (PT) evaluation only  -CS       Row Name 03/08/24 0800          PT Evaluation Complexity    History, PT Evaluation Complexity 1-2 personal factors and/or comorbidities  -CS     Examination of Body Systems (PT Eval Complexity) total of 4 or more elements  -CS     Clinical Presentation (PT Evaluation Complexity) stable  -CS     Clinical Decision Making (PT Evaluation Complexity) low complexity  -CS     Overall Complexity (PT Evaluation Complexity) low complexity  -CS       Row Name 03/08/24 0800          Therapy Plan Review/Discharge Plan (PT)    Therapy Plan Review (PT) evaluation/treatment results reviewed;patient  -CS       Row Name 03/08/24 0800          Physical Therapy Goals    Problem Specific Goal Selection (PT) problem specific goal 1, PT  -CS       Row Name 03/08/24 0800          Problem Specific Goal 1 (PT)    Problem Specific Goal 1 (PT) Complete physical therapy evaluation.  -CS     Time Frame (Problem Specific Goal 1, PT) by discharge  -CS     Progress/Outcome (Problem Specific Goal 1, PT) goal met  -CS               User Key  (r) = Recorded By, (t) = Taken By, (c) = Cosigned By      Initials Name Provider Type    CS Mary Aparicio, PT Physical Therapist                     Physical Therapy Education       Title: PT OT SLP Therapies (Done)       Topic: Physical Therapy (Done)       Point: Mobility training (Done)       Learning Progress Summary             Patient Acceptance, E,TB, VU by NM at 2/29/2024 1107   Family Acceptance, E, VU,DU by  at 3/6/2024 1148                                         User Key       Initials Effective Dates Name Provider Type Discipline     01/19/22 -  Reagan Green, RN Registered Nurse Nurse    NM 01/31/24 -  Jeff Paz, JAYME Student PT Student PT                  PT Recommendation and Plan  Anticipated Discharge Disposition (PT): home, home with home health  Therapy Frequency (PT): evaluation only  Plan of Care Reviewed With: patient  Progress: improving  Outcome Evaluation: Pt presents with no physical limitations or impairments that impede his ability to return home independently or with assist from his family as needed. Encouraged ambulation as tolerated with supervision from staff or family. May benefit from follow-up of home health services to focus on home safety and endurance in his environment. He will be discharged from PT caseload as no PT services needed while in the hospital.   Outcome Measures       Row Name 03/08/24 0800             How much help from another person do you currently need...    Turning from your back to your side while in flat bed without using bedrails? 4  -CS      Moving from lying on back to sitting on the side of a flat bed without bedrails? 4  -CS      Moving to and from a bed to a chair (including a wheelchair)? 4  -CS      Standing up from a chair using your arms (e.g., wheelchair, bedside chair)? 4  -CS      Climbing 3-5 steps with a railing? 3  -CS      To walk in hospital room? 3  -CS      AM-PAC 6 Clicks Score (PT) 22  -CS      Highest Level of Mobility Goal 7 --> Walk 25 feet or more  -CS         Functional Assessment    Outcome Measure Options AM-PAC 6 Clicks Basic Mobility (PT)  -CS                 User Key  (r) = Recorded By, (t) = Taken By, (c) = Cosigned By      Initials Name Provider Type    Mary Hinkle, PT Physical Therapist                     Time Calculation:    PT Charges       Row Name 03/08/24 0812             Time Calculation    PT Received On 03/08/24  -CS         Untimed Charges    PT Eval/Re-eval Minutes 25  -CS         Total Minutes    Untimed Charges Total Minutes 25  -CS       Total Minutes 25  -CS                User Key  (r) = Recorded By, (t) = Taken By, (c) = Cosigned By      Initials Name Provider Type    Mary Hinkle, PT Physical Therapist                  Therapy Charges for Today       Code Description Service Date Service Provider Modifiers Qty    [id number] HC PT EVAL LOW COMPLEXITY 2 3/8/2024 Mary Aparicio, PT GP 1            PT G-Codes  Outcome Measure Options: AM-PAC 6 Clicks Basic Mobility (PT)  AM-PAC 6 Clicks Score (PT): 22  AM-PAC 6 Clicks Score (OT): 21    Mary Aparicio PT  3/8/2024

## 2024-03-08 NOTE — PROGRESS NOTES
[facility]   Hospitalist Progress Note  Date: 3/8/2024  Patient Name: Nelson Wang  : 1945  MRN: [id number]  Date of admission: 2024      Subjective   Subjective     Chief complaint: Shortness of breath    Summary:  79-year-old male with history of diabetes, ESRD on PD, psoriasis, on monoclonal antibody therapy, immunosuppression secondary to Skyrizi, hypertension, dyslipidemia, recent C. difficile infection, hearing impairment, bipolar, CAD, vertigo, GERD without esophagitis, recent E. coli bacteremia with septic shock, C. difficile colitis, peritonitis, pneumonia, discharged with PICC line, to complete course of antibiotics, was arrived back into the hospital with worsening shortness of breath, volume overloaded, worsening progression of multifocal pneumonia, bilateral pleural effusions, pulmonary consulted, nephrology consulted, bronchoscopy performed, mucous plugging removed, increasing confused and agitated, required sedating medications to be initiated, psychiatry consulted, patient finally rested after several days, did have some decompensation in breathing, pulmonary following, started on Abilify, seems to be helping    Interval follow-up: Seen and examined this morning, doing better, off oxygen, using oxygen at night, less confusion, sleeping better.  Still with some concerns of increased grogginess in the mornings, takes him a long time to wake up.  Calm, collected thoughts.  Open to rehab placement.  States he is weak on his feet.  No chest pain or palpitations.  Heart rate and blood pressure stable.  Procalcitonin 0.46, white blood cell count 5000, hemoglobin 10.0, creatinine 6.06, BUN 29, potassium 3.8.  Chest x-ray showing persistent low lung volumes with cardiomegaly and vascular congestion.  Persistent right basilar infiltrate.    Review of systems:  All systems reviewed and negative except for weakness, fatigue, cough, shortness of breath with exertion      Objective   Objective      Vitals:   Temp:  [97.7 °F (36.5 °C)-98.1 °F (36.7 °C)] 97.7 °F (36.5 °C)  Heart Rate:  [84-93] 85  Resp:  [16-18] 18  BP: (127-151)/(53-69) 138/69  Flow (L/min):  [2] 2  Physical Exam   Constitutional: Awake, alert, communicating appropriately, alert and oriented x 3 sitting in the bedside sofa  Eyes: Pupils equal, sclerae anicteric, no conjunctival injection   HENT: NCAT, mucous membranes moist   Neck: Supple, full range of motion  Respiratory: Diminished coarse breath sounds with no rhonchi or wheezing, does have fine crackles on the right side   Cardiovascular: RRR, no murmurs, rubs, or gallops, palpable pedal pulses bilaterally   Gastrointestinal: Positive bowel sounds, soft, nontender, distended, PD catheter in place    Musculoskeletal: No bilateral ankle edema, no clubbing or cyanosis to extremities   Psychiatric: Calm and cooperative   Neurologic: Oriented x 3, strength symmetric in all extremities, Cranial Nerves grossly intact to confrontation, speech clear   Skin: No rashes visible on exposed skin     Result Review    Result Review:  I have personally reviewed the pertinent results from the past 24 hours to 3/8/2024 14:46 EST and agree with these findings:  [x]  Laboratory   CBC          3/6/2024    05:22 3/7/2024    07:46 3/8/2024    04:29   CBC   WBC 4.52  4.80  5.45    RBC 3.10  3.48  3.49    Hemoglobin 9.1  10.1  10.0    Hematocrit 30.3  34.3  34.8    MCV 97.7  98.6  99.7    MCH 29.4  29.0  28.7    MCHC 30.0  29.4  28.7    RDW 16.8  16.6  16.5    Platelets 172  184  193      BMP          3/6/2024    05:22 3/7/2024    07:46 3/8/2024    04:29   BMP   BUN 35  29  29    Creatinine 6.87  6.28  6.06    Sodium 142  143  141    Potassium 3.2  3.4  3.8    Chloride 104  103  101    CO2 26.7  28.5  29.0    Calcium 8.1  8.5  9.0      LIVER FUNCTION TESTS:      Lab 03/08/24  0429 03/07/24  0746 03/06/24  0522   TOTAL PROTEIN 6.3 6.1 5.7*   ALBUMIN 2.8* 2.6* 2.3*   ALT (SGPT) 16 13 13   AST (SGOT) 26 23 24    BILIRUBIN 0.3 0.4 0.4   BILIRUBIN DIRECT <0.2 <0.2 <0.2   ALK PHOS 58 54 52       [x]  Microbiology   Microbiology Results (last 10 days)       Procedure Component Value - Date/Time    Respiratory Culture - Wash, Bronchus [[id number]] Collected: 03/01/24 0932    Lab Status: Final result Specimen: Wash from Bronchus Updated: 03/03/24 1133     Respiratory Culture Moderate growth (3+) Normal respiratory lashae. No S. aureus or Pseudomonas aeruginosa detected. Final report.     Gram Stain Few (2+) Gram positive cocci in pairs and chains      Few (2+) WBCs seen    Fungus Culture - Lavage, Lung, Right Lower Lobe [[id number]] Collected: 03/01/24 0922    Lab Status: Preliminary result Specimen: Lavage from Lung, Right Lower Lobe Updated: 03/08/24 1015     Fungus Culture No fungus isolated at 1 week    Virus Culture - Lavage, Lung, Right Lower Lobe [[id number]] Collected: 03/01/24 0922    Lab Status: Preliminary result Specimen: Lavage from Lung, Right Lower Lobe Updated: 03/07/24 1410     Viral Culture, General Comment     Comment: Preliminary Report:  No virus isolated at 4 days.  Next report to follow after 7 days.       Narrative:      Performed at:  [address] [place] [address]  [id number]  : Maricarmen Mensah MD, Phone:  [phone]    AFB Culture - Lavage, Lung, Right Lower Lobe [[id number]] Collected: 03/01/24 0922    Lab Status: Preliminary result Specimen: Lavage from Lung, Right Lower Lobe Updated: 03/08/24 1015     AFB Culture No AFB isolated at 1 week     AFB Stain No acid fast bacilli seen on direct smear      No acid fast bacilli seen on concentrated smear    BAL Culture, Quantitative - Lavage, Lung, Right Lower Lobe [[id number]] Collected: 03/01/24 0922    Lab Status: Final result Specimen: Lavage from Lung, Right Lower Lobe Updated: 03/03/24 1132     BAL Culture 50,000 CFU/mL Normal respiratory lashae. No S. aureus or Pseudomonas aeruginosa detected. Final report.     Gram  Stain Few (2+) Gram positive cocci in pairs and chains      Rare (1+) WBCs seen    Pneumonia Panel - Lavage, Lung, Right Lower Lobe [[id number]]  (Normal) Collected: 03/01/24 0922    Lab Status: Final result Specimen: Lavage from Lung, Right Lower Lobe Updated: 03/01/24 1145     Escherichia coli PCR Not Detected     Acinetobacter calcoaceticus-baumannii complex PCR Not Detected     Enterobacter cloacae PCR Not Detected     Klebsiella oxytoca PCR Not Detected     Klebsiella pneumoniae group PCR Not Detected     Klebsiella aerogenes PCR Not Detected     Moraxella catarrhalis PCR Not Detected     Proteus species PCR Not Detected     Pseudomonas aeroginosa PCR Not Detected     Serratia marcescens PCR Not Detected     Staphylococcus aureus PCR Not Detected     Streptococcus pyogenes PCR Not Detected     Haemophilus influenzae PCR Not Detected     Streptococcus agalactiae PCR Not Detected     Streptococcus pneumoniae PCR Not Detected     Chlamydophila pneumoniae PCR Not Detected     Legionella pneumophilia PCR Not Detected     Mycoplasma pneumo by PCR Not Detected     ADENOVIRUS, PCR Not Detected     CTX-M Gene N/A     IMP Gene N/A     KPC Gene N/A     mecA/C and MREJ Gene N/A     NDM Gene N/A     OXA-48-like Gene N/A     VIM Gene N/A     Coronavirus Not Detected     Human Metapneumovirus Not Detected     Human Rhinovirus/Enterovirus Not Detected     Influenza A PCR Not Detected     Influenza B PCR Not Detected     RSV, PCR Not Detected     Parainfluenza virus PCR Not Detected    Cytomegalovirus, PCR - Lavage, Lung, Right Lower Lobe [[id number]] Collected: 03/01/24 0922    Lab Status: Final result Specimen: Lavage from Lung, Right Lower Lobe Updated: 03/06/24 1907     Source Comment     Comment: BAL, RLL        Cytomegalovirus PCR Negative     Comment: -------------------ADDITIONAL INFORMATION-------------------  This test was developed and its performance characteristics  determined by [facility] in a manner consistent  with CLIA  requirements. This test has not been cleared or approved by  the [place]. Food and Drug Administration.       Narrative:      Performed at:  01 - [facility]  [address] [facility], [place], [place]  [id number]  : Galileo Jeong , Phone:  [phone]    Respiratory Panel PCR w/COVID-19(SARS-CoV-2) RA/TANIA/GAVIN/PAD/COR/NENA In-House, NP Swab in UTM/VTM, 2 HR TAT - Swab, Nasopharynx [[id number]]  (Normal) Collected: 02/29/24 0959    Lab Status: Final result Specimen: Swab from Nasopharynx Updated: 02/29/24 1131     ADENOVIRUS, PCR Not Detected     Coronavirus 229E Not Detected     Coronavirus HKU1 Not Detected     Coronavirus NL63 Not Detected     Coronavirus OC43 Not Detected     COVID19 Not Detected     Human Metapneumovirus Not Detected     Human Rhinovirus/Enterovirus Not Detected     Influenza A PCR Not Detected     Influenza B PCR Not Detected     Parainfluenza Virus 1 Not Detected     Parainfluenza Virus 2 Not Detected     Parainfluenza Virus 3 Not Detected     Parainfluenza Virus 4 Not Detected     RSV, PCR Not Detected     Bordetella pertussis pcr Not Detected     Bordetella parapertussis PCR Not Detected     Chlamydophila pneumoniae PCR Not Detected     Mycoplasma pneumo by PCR Not Detected    Narrative:      In the setting of a positive respiratory panel with a viral infection PLUS a negative procalcitonin without other underlying concern for bacterial infection, consider observing off antibiotics or discontinuation of antibiotics and continue supportive care. If the respiratory panel is positive for atypical bacterial infection (Bordetella pertussis, Chlamydophila pneumoniae, or Mycoplasma pneumoniae), consider antibiotic de-escalation to target atypical bacterial infection.    MRSA Screen, PCR (Inpatient) - Swab, Nares [[id number]]  (Normal) Collected: 02/29/24 0557    Lab Status: Final result Specimen: Swab from Nares Updated: 02/29/24 0826     MRSA PCR No MRSA Detected     Narrative:      The negative predictive value of this diagnostic test is high and should only be used to consider de-escalating anti-MRSA therapy. A positive result may indicate colonization with MRSA and must be correlated clinically.    COVID-19, FLU A/B, RSV PCR 1 HR TAT - Swab, Nasopharynx [[id number]]  (Normal) Collected: 02/28/24 2201    Lab Status: Final result Specimen: Swab from Nasopharynx Updated: 02/28/24 2244     COVID19 Not Detected     Influenza A PCR Not Detected     Influenza B PCR Not Detected     RSV, PCR Not Detected    Narrative:      Fact sheet for providers: https://www.fda.gov/media/689103/download    Fact sheet for patients: https://www.fda.gov/media/637667/download    Test performed by PCR.    Blood Culture - Blood, Hand, Left [[id number]]  (Normal) Collected: 02/28/24 2150    Lab Status: Final result Specimen: Blood from Hand, Left Updated: 03/04/24 2200     Blood Culture No growth at 5 days    Blood Culture - Blood, Hand, Left [[id number]]  (Normal) Collected: 02/28/24 2150    Lab Status: Final result Specimen: Blood from Hand, Left Updated: 03/04/24 2200     Blood Culture No growth at 5 days              [x]  Radiology XR Chest 1 View    Result Date: 3/8/2024   Persistent low lung volumes with cardiomegaly and vascular congestion.  Persistent right basilar infiltrate or atelectasis with a small right effusion.       ERIC ELLISON MD       Electronically Signed and Approved By: ERIC ELLISON MD on 3/08/2024 at 5:48             CT Head Without Contrast    Result Date: 3/5/2024    1. Atrophy and changes of chronic microvascular ischemic disease, but no acute intracranial pathology.     EMILY SOTO MD       Electronically Signed and Approved By: EMILY SOTO MD on 3/05/2024 at 16:02             XR Chest 1 View    Result Date: 3/4/2024   Worsening of bilateral infiltrates with worsening of a right pleural effusion.       ERIC ELLISON MD       Electronically Signed and Approved By: ERIC  SCOT EVERETT on 3/04/2024 at 6:12                [x]  EKG/Telemetry   ECG 12 Lead ED Triage Standing Order; SOA   Final Result   HEART RATE= 83  bpm   RR Interval= 724  ms   MI Interval= 224  ms   P Horizontal Axis= -16  deg   P Front Axis= 24  deg   QRSD Interval= 94  ms   QT Interval= 426  ms   QTcB= 501  ms   QRS Axis= 3  deg   T Wave Axis= 9  deg   - ABNORMAL ECG -   Sinus rhythm   Prolonged MI interval   Low voltage, extremity and precordial leads   Nonspecific T abnrm, anterolateral leads   Minimal ST elevation, inferior leads   Prolonged QT interval   When compared with ECG of 22-Jul-2023 18:04:19,   Significant rate increase   Electronically Signed By: Bradley Hernandez ([facility]) 29-Feb-2024 14:20:52   Date and Time of Study: 2024-02-28 21:58:46          [x]  Cardiology/Vascular   []  Pathology  [x]  Old records  []  Other:    Assessment & Plan   Assessment / Plan     Assessment/Plan:      Assessment:  Acute on chronic hypoxic respiratory failure  Concern for worsening multifocal pneumonia versus aspiration pneumonia  Mucous plugging of the airway  Right pleural effusion  Elevated troponin unsure if type I or type II NSTEMI  End-stage renal disease on peritoneal dialysis  Diabetes mellitus  Hypertension  Hypothyroidism  Recent E. coli bacteremia  Recent C. difficile colitis  Delirium versus acute toxic encephalopathy due to poor clearance of medication in the setting of ESRD  Concern for manic episode with underlying bipolar disorder    Plan:  Labs and imaging reviewed  Admit to chair  PT/OT  Working on placement at [facility] if he qualifies  Continue lorazepam 0.25 mg nightly  Continue Abilify 2 mg daily  Psychiatry consulted discussed with Dr. Vines, recommendations appreciated  Continue increased dose of levothyroxine to 150 mcg daily  Continue safety sitter; may be able to discontinue next 24 to 48 hours  Tussionex for cough suppression  Completed Unasyn, monitoring  Continue atorvastatin 40 mg daily  Continue  aspirin 81 mg daily  Continue Pulmicort and Brovana nebs twice daily  Peritoneal dialysis per nephrology  Discussed at length with pulmonary Dr. Jimenez, recommendations appreciated; nocturnal oxygen study ordered; may benefit from device he does have sleep apnea  Continue torsemide 100 mg daily  A.m. labs  Full code  DVT prophylaxis with heparin  Clinical course dictate further management  Discussed with nurse at the bedside  Discussed with patient's daughter at the bedside    DVT prophylaxis:  Medical DVT prophylaxis orders are present.        CODE STATUS:   Level Of Support Discussed With: Patient  Code Status (Patient has no pulse and is not breathing): CPR (Attempt to Resuscitate)  Medical Interventions (Patient has pulse or is breathing): Full Support    Electronically signed by Saúl Vargas MD, 03/08/24, 2:59 PM EST.  Portions of this documentation were transcribed electronically from a voice recognition software.  I confirm all data accurately represents the service(s) I performed at today's visit.

## 2024-03-08 NOTE — PLAN OF CARE
Goal Outcome Evaluation:  Plan of Care Reviewed With: patient        Progress: improving  Outcome Evaluation: patient remains alert and oriented x4, on room air during the day. patient up in chair for part of shift. Ambulated 360 feet, experienced some SOA and tachycardia 3/4 way through, recovered once seated. blood glucose monitored. complained of cough, medicated per mar. PD completed by cycler, daughter unhooked patient. midline dressing changed. no new issues at this time. Daughter states image of input and output of dialysis has been sent to nephrologist daily.

## 2024-03-08 NOTE — THERAPY EVALUATION
Patient Name: Nelson Wang  : 1945    MRN: [id number]                              Today's Date: 3/8/2024       Admit Date: 2024    Visit Dx:     ICD-10-CM ICD-9-CM   1. Acute respiratory failure with hypoxia  J96.01 518.81   2. Multifocal pneumonia  J18.9 486   3. Difficulty in walking  R26.2 719.7   4. Decreased activities of daily living (ADL)  Z78.9 V49.89   5. Oropharyngeal dysphagia  R13.12 787.22   6. Aspiration pneumonitis  J69.0 507.0     Patient Active Problem List   Diagnosis    Essential hypertension    Bradycardia, sinus    Anemia due to chronic kidney disease    Hypothyroidism    Idiopathic gout    Proteinuria    Psoriasis    Thrombocytopenia    Type 2 diabetes mellitus without complication    CKD (chronic kidney disease), stage IV    Hyperlipidemia    Monoclonal gammopathy of unknown significance (MGUS)    Stage 5 chronic kidney disease    Peritoneal dialysis catheter in place    Chronic gout without tophus    Peritoneal dialysis catheter dysfunction    Medicare annual wellness visit, subsequent    Chronic cough    Peritonitis    HCAP (healthcare-associated pneumonia)    Acute on chronic respiratory failure with hypoxia    ESRD (end stage renal disease)    Aspiration pneumonitis     Past Medical History:   Diagnosis Date    Anemia     Ankle pain, right     CKD (chronic kidney disease), stage IV     Diabetes     Gout     High cholesterol     HL (hearing loss)     Hyperkalemia     Hypertension     Hypothyroidism     Psoriasis     Vitiligo      Past Surgical History:   Procedure Laterality Date    ANKLE SURGERY  1990    APPENDECTOMY N/A     BRONCHOSCOPY N/A 3/1/2024    Procedure: BRONCHOSCOPY WITH BAL AND WASHINGS;  Surgeon: Sandra Espinal MD;  Location: [facility] ENDOSCOPY;  Service: Pulmonary;  Laterality: N/A;  PNEUMONIA    COLONOSCOPY N/A 2022    [facility]    HIP BIPOLAR REPLACEMENT Right 2000    INSERTION PERITONEAL DIALYSIS CATHETER N/A 3/27/2023    Procedure:  LAPAROSCOPIC INSERTION PERITONEAL DIALYSIS CATHETER, LAPAROSCOPIC OMENTOPEXY WITH LYSIS OF ADHESIONS;  Surgeon: Jose Berry MD;  Location: [facility] MAIN OR;  Service: General;  Laterality: N/A;    INSERTION PERITONEAL DIALYSIS CATHETER Left 7/23/2023    Procedure: REVISION OF PERITONEAL DIALYSIS CATHETER;  Surgeon: Radha Oreilly MD;  Location: [facility] MAIN OR;  Service: General;  Laterality: Left;    RENAL BIOPSY Left 07/15/2022      General Information       Row Name 03/08/24 1052          OT Time and Intention    Document Type evaluation  -PG     Mode of Treatment individual therapy;occupational therapy  -PG       Row Name 03/08/24 1052          General Information    Prior Level of Function independent:;transfer  -PG     Existing Precautions/Restrictions fall  -PG     Barriers to Rehab none identified  -PG       Row Name 03/08/24 1052          Occupational Profile    Reason for Services/Referral (Occupational Profile) Patient admitted to [facility] 2/28 for shortness of air and generalized weakness.  Patient being evaluated by Occupational Therapy due to recent decline in ADL function  -PG       Row Name 03/08/24 1052          Living Environment    People in Home child(laura), adult  -PG       Row Name 03/08/24 1052          Cognition    Orientation Status (Cognition) oriented to;person;place  -PG       Row Name 03/08/24 1052          Safety Issues, Functional Mobility    Impairments Affecting Function (Mobility) endurance/activity tolerance;strength  -PG               User Key  (r) = Recorded By, (t) = Taken By, (c) = Cosigned By      Initials Name Provider Type    PG Chauncey Goldberg, OT Occupational Therapist                     Mobility/ADL's       Row Name 03/08/24 1053          Transfers    Transfers bed-chair transfer  -PG       Row Name 03/08/24 1053          Bed-Chair Transfer    Bed-Chair [place] (Transfers) contact guard  -PG       Row Name 03/08/24 1053          Activities of Daily  Living    BADL Assessment/Intervention bathing  -PG       Row Name 03/08/24 1053          Bathing Assessment/Intervention    [place] Level (Bathing) minimum assist (75% patient effort);bathing skills  -PG       Row Name 03/08/24 1053          Upper Body Dressing Assessment/Training    [place] Level (Upper Body Dressing) set up  -PG       Row Name 03/08/24 1053          Lower Body Dressing Assessment/Training    [place] Level (Lower Body Dressing) lower body dressing skills;minimum assist (75% patient effort)  -PG       Row Name 03/08/24 1053          Grooming Assessment/Training    [place] Level (Grooming) grooming skills;set up  -PG       Row Name 03/08/24 1053          Toileting Assessment/Training    [place] Level (Toileting) toileting skills;minimum assist (75% patient effort)  -PG       Row Name 03/08/24 1053          Self-Feeding Assessment/Training    [place] Level (Feeding) feeding skills;set up  -PG               User Key  (r) = Recorded By, (t) = Taken By, (c) = Cosigned By      Initials Name Provider Type    PG Chauncey Goldberg OT Occupational Therapist                   Obj/Interventions       Row Name 03/08/24 1054          Sensory Assessment (Somatosensory)    Sensory Assessment (Somatosensory) sensation intact  -PG       Row Name 03/08/24 1054          Vision Assessment/Intervention    Visual Impairment/Limitations WFL  -PG       Row Name 03/08/24 1054          Strength Comprehensive (MMT)    Comment, General Manual Muscle Testing (MMT) Assessment 4 -/5 BUE  -PG       Row Name 03/08/24 1054          Motor Skills    Motor Skills coordination;functional endurance  -PG     Coordination WFL  -PG               User Key  (r) = Recorded By, (t) = Taken By, (c) = Cosigned By      Initials Name Provider Type    Chauncey Vizcarra OT Occupational Therapist                   Goals/Plan       Row Name 03/08/24 1055          Transfer Goal 1 (OT)    Activity/Assistive Device (Transfer  Goal 1, OT) transfers, all  -PG     [place] Level/Cues Needed (Transfer Goal 1, OT) modified independence  -PG     Time Frame (Transfer Goal 1, OT) long term goal (LTG);10 days  -PG       Row Name 03/08/24 1055          Bathing Goal 1 (OT)    Activity/Device (Bathing Goal 1, OT) bathing skills, all  -PG     [place] Level/Cues Needed (Bathing Goal 1, OT) modified independence  -PG     Time Frame (Bathing Goal 1, OT) long term goal (LTG);10 days  -PG       Row Name 03/08/24 1055          Dressing Goal 1 (OT)    Activity/Device (Dressing Goal 1, OT) dressing skills, all  -PG     [place]/Cues Needed (Dressing Goal 1, OT) modified independence  -PG     Time Frame (Dressing Goal 1, OT) long term goal (LTG);10 days  -PG       Row Name 03/08/24 1055          Toileting Goal 1 (OT)    Activity/Device (Toileting Goal 1, OT) toileting skills, all  -PG     [place] Level/Cues Needed (Toileting Goal 1, OT) modified independence  -PG     Time Frame (Toileting Goal 1, OT) long term goal (LTG);10 days  -PG       Row Name 03/08/24 1055          Grooming Goal 1 (OT)    Activity/Device (Grooming Goal 1, OT) grooming skills, all  -PG     [place] (Grooming Goal 1, OT) modified independence  -PG     Time Frame (Grooming Goal 1, OT) long term goal (LTG);10 days  -PG       Row Name 03/08/24 1055          Strength Goal 1 (OT)    Strength Goal 1 (OT) Patient will improve bilateral upper extremity strength 4+/5 to support independence and engagement with ADL activities  -PG     Time Frame (Strength Goal 1, OT) long term goal (LTG);10 days  -PG       Row Name 03/08/24 1055          Problem Specific Goal 1 (OT)    Problem Specific Goal 1 (OT) Patient will improve activity tolerance to fair plus to support independence with self-care activities  -PG     Time Frame (Problem Specific Goal 1, OT) long term goal (LTG);10 days  -PG       Row Name 03/08/24 1055          Therapy Assessment/Plan (OT)    Planned Therapy  Interventions (OT) activity tolerance training;BADL retraining;occupation/activity based interventions;patient/caregiver education/training;transfer/mobility retraining  -PG               User Key  (r) = Recorded By, (t) = Taken By, (c) = Cosigned By      Initials Name Provider Type    PG Chauncey Goldberg, OT Occupational Therapist                   Clinical Impression       Row Name 03/08/24 1054          Pain Assessment    Pretreatment Pain Rating 0/10 - no pain  -PG     Posttreatment Pain Rating 0/10 - no pain  -PG       Row Name 03/08/24 1054          Plan of Care Review    Plan of Care Reviewed With patient  -PG     Progress no change  -PG     Outcome Evaluation Patient presents with limitations affecting strength, activity tolerance, and balance impacting patient's ability to return home safely and independently.  The skills of a therapist will be required to safely and effectively implement the following treatment plan to restore maximal level of function  -PG       Row Name 03/08/24 1054          Therapy Assessment/Plan (OT)    Patient/Family Therapy Goal Statement (OT) Patient reports that he is very weak and would like to gain his strength and endurance back in a rehab facility before returning home  -PG     Criteria for Skilled Therapeutic Interventions Met (OT) yes;meets criteria;skilled treatment is necessary  -PG     Therapy Frequency (OT) 5 times/wk  -PG       Row Name 03/08/24 1054          Therapy Plan Review/Discharge Plan (OT)    Anticipated Discharge Disposition (OT) skilled nursing facility  -PG               User Key  (r) = Recorded By, (t) = Taken By, (c) = Cosigned By      Initials Name Provider Type    PG Chauncey Goldberg, OT Occupational Therapist                   Outcome Measures       Row Name 03/08/24 1056          How much help from another is currently needed...    Putting on and taking off regular lower body clothing? 3  -PG     Bathing (including washing, rinsing, and drying) 3  -PG      Toileting (which includes using toilet bed pan or urinal) 3  -PG     Putting on and taking off regular upper body clothing 4  -PG     Taking care of personal grooming (such as brushing teeth) 4  -PG     Eating meals 4  -PG     AM-PAC 6 Clicks Score (OT) 21  -PG       Row Name 03/08/24 0901 03/08/24 0800       How much help from another person do you currently need...    Turning from your back to your side while in flat bed without using bedrails? 4  -AR 4  -CS    Moving from lying on back to sitting on the side of a flat bed without bedrails? 4  -AR 4  -CS    Moving to and from a bed to a chair (including a wheelchair)? 4  -AR 4  -CS    Standing up from a chair using your arms (e.g., wheelchair, bedside chair)? 4  -AR 4  -CS    Climbing 3-5 steps with a railing? 3  -AR 3  -CS    To walk in hospital room? 3  -AR 3  -CS    AM-PAC 6 Clicks Score (PT) 22  -AR 22  -CS    Highest Level of Mobility Goal 7 --> Walk 25 feet or more  -AR 7 --> Walk 25 feet or more  -CS      Row Name 03/08/24 1056 03/08/24 0800       Functional Assessment    Outcome Measure Options AM-PAC 6 Clicks Daily Activity (OT);Optimal Instrument  -PG AM-PAC 6 Clicks Basic Mobility (PT)  -      Row Name 03/08/24 1056          Optimal Instrument    Optimal Instrument Optimal - 3  -PG     Bending/Stooping 2  -PG     Standing 2  -PG     Reaching 1  -PG               User Key  (r) = Recorded By, (t) = Taken By, (c) = Cosigned By      Initials Name Provider Type    PG Chauncey Goldberg, OT Occupational Therapist    CS Mary Aparicio, PT Physical Therapist    AR Audrey Cameron, RN Registered Nurse                    Occupational Therapy Education       Title: PT OT SLP Therapies (Done)       Topic: Occupational Therapy (Done)       Point: ADL training (Done)       Description:   Instruct learner(s) on proper safety adaptation and remediation techniques during self care or transfers.   Instruct in proper use of assistive devices.                  Learning Progress  Summary             Patient Acceptance, E, VU by  at 2/29/2024 1152   Family Acceptance, E, VU,DU by  at 3/6/2024 1148                         Point: Precautions (Done)       Description:   Instruct learner(s) on prescribed precautions during self-care and functional transfers.                  Learning Progress Summary             Patient Acceptance, E, VU by  at 2/29/2024 1152   Family Acceptance, E, VU,DU by  at 3/6/2024 1148                         Point: Body mechanics (Done)       Description:   Instruct learner(s) on proper positioning and spine alignment during self-care, functional mobility activities and/or exercises.                  Learning Progress Summary             Patient Acceptance, E, VU by  at 2/29/2024 1152   Family Acceptance, E, VU,DU by  at 3/6/2024 1148                                         User Key       Initials Effective Dates Name Provider Type Discipline     01/19/22 -  Reagan Green RN Registered Nurse Nurse     01/10/24 -  Ciro Guzman OT Student OT Student OT                  OT Recommendation and Plan  Planned Therapy Interventions (OT): activity tolerance training, BADL retraining, occupation/activity based interventions, patient/caregiver education/training, transfer/mobility retraining  Therapy Frequency (OT): 5 times/wk  Plan of Care Review  Plan of Care Reviewed With: patient  Progress: no change  Outcome Evaluation: Patient presents with limitations affecting strength, activity tolerance, and balance impacting patient's ability to return home safely and independently.  The skills of a therapist will be required to safely and effectively implement the following treatment plan to restore maximal level of function     Time Calculation:   Evaluation Complexity (OT)  Review Occupational Profile/Medical/Therapy History Complexity: brief/low complexity  Assessment, Occupational Performance/Identification of Deficit Complexity: 3-5 performance  deficits  Clinical Decision Making Complexity (OT): problem focused assessment/low complexity  Overall Complexity of Evaluation (OT): low complexity     Time Calculation- OT       Row Name 03/08/24 1057             Time Calculation- OT    OT Received On 03/08/24  -PG      OT Goal Re-Cert Due Date 03/17/24  -PG         Untimed Charges    OT Eval/Re-eval Minutes 35  -PG         Total Minutes    Untimed Charges Total Minutes 35  -PG       Total Minutes 35  -PG                User Key  (r) = Recorded By, (t) = Taken By, (c) = Cosigned By      Initials Name Provider Type    PG Chauncey Goldberg OT Occupational Therapist                  Therapy Charges for Today       Code Description Service Date Service Provider Modifiers Qty    [id number] HC OT EVAL LOW COMPLEXITY 3 3/8/2024 Chauncey Goldberg OT GO 1                 Chauncey Goldberg OT  3/8/2024

## 2024-03-08 NOTE — PLAN OF CARE
Goal Outcome Evaluation:  Plan of Care Reviewed With: patient        Progress: improving  Outcome Evaluation: Pt presents with no physical limitations or impairments that impede his ability to return home independently or with assist from his family as needed. Encouraged ambulation as tolerated with supervision from staff or family. May benefit from follow-up of home health services to focus on home safety and endurance in his environment. He will be discharged from PT caseload as no PT services needed while in the hospital.      Anticipated Discharge Disposition (PT): home, home with home health

## 2024-03-08 NOTE — PROGRESS NOTES
[facility]     Nephrology Progress Note      Patient Name: Nelson Wang  : 1945  MRN: [id number]  Primary Care Physician:  Janna Mo MD  Date of admission: 2024    Subjective   Subjective     Interval History:  PT awake and alert today.  Seen this morning, mildly altered, slightly slow to respond.,  Not coughing.  No issues with PD overnight .        Objective   Objective     Vitals:   Temp:  [97.7 °F (36.5 °C)-98.1 °F (36.7 °C)] 97.7 °F (36.5 °C)  Heart Rate:  [84-93] 85  Resp:  [16-18] 18  BP: (127-151)/(53-69) 138/69  Flow (L/min):  [2] 2  Physical Exam:   Constitutional: Alert, sitting in bed, no distress   Eyes: sclerae anicteric, no conjunctival injection   HENT: mucous membranes moist   Neck: Supple, no thyromegaly, no lymphadenopathy, trachea midline, no JVD   Respiratory: Decreased with scattered crackles bilaterally, nonlabored respirations, no rhonchi   Cardiovascular: RRR, no murmurs, rubs, or gallops.   Gastrointestinal: Positive bowel sounds, soft, nontender, nondistended, PD catheter exit site is covered.   Musculoskeletal: No edema, no clubbing or cyanosis   Psychiatric:    Neurologic: Awake, alert, moving extremities.   Skin: warm and dry, no rashes, vitiligo    Result Review    Result Reviewed:  I have personally reviewed the results from the time of this admission to 3/8/2024 15:31 EST and agree with these findings:  [x]  Laboratory  []  Microbiology  [x]  Radiology  []  EKG/Telemetry   []  Cardiology/Vascular   []  Pathology  []  Old records  []  Other:        Lab 24  0429 24  0746 24  0522 24  0415 24  0543 24  0255 24  1158 24  0527   SODIUM 141 143 142 140  --  140 139 138   SODIUM, ARTERIAL  --   --   --   --  137.6  --   --   --    POTASSIUM 3.8 3.4* 3.2* 4.0  --  3.6 3.3* 3.5   CHLORIDE 101 103 104 101  --  102 102 102   CO2 29.0 28.5 26.7 23.6  --  25.3 24.2 26.0   BUN 29* 29* 35* 32*  --  34* 35* 35*    CREATININE 6.06* 6.28* 6.87* 6.11*  --  5.93* 6.36* 7.02*   GLUCOSE 252* 205* 196* 200*  --  209* 151* 142*   GLUCOSE, ARTERIAL  --   --   --   --  140*  --   --   --    EGFR 8.9* 8.5* 7.6* 8.8*  --  9.1* 8.4* 7.4*   ANION GAP 11.0 11.5 11.3 15.4*  --  12.7 12.8 10.0   MAGNESIUM 1.8 2.0 1.5*  --   --  1.7  --  1.6   PHOSPHORUS 2.6 2.7 3.5  --   --  4.9*  --  5.3*           Assessment & Plan   Assessment / Plan       Active Hospital Problems:  Active Hospital Problems    Diagnosis     **Acute on chronic respiratory failure with hypoxia     HCAP (healthcare-associated pneumonia)     ESRD (end stage renal disease)     Aspiration pneumonitis     Peritoneal dialysis catheter in place     Essential hypertension     Anemia due to chronic kidney disease     Type 2 diabetes mellitus without complication        Assessment and Plan:    - End-stage renal disease on peritoneal dialysis, with evidence of increased volume and transient hypoxia.  Better now.  Would continue 2.5% dextrose solution for the night cycler and the 1.5% midday exchange.  Electrolytes are stable.  Volume status improving.   Continue gentamicin 0.1% cream daily application to PD catheter exit site.  Continue with nystatin swish and swallow for fungal peritonitis prevention while on antibiotics.  - Pneumonia, status post bronchoscopy, per pulmonary.  Treated with antibiotics and steroids.  Concerns for silent aspiration.  - Hypertension, blood pressure is acceptable.  - Anemia of chronic kidney disease, hemoglobin is stable.  At goal, on long-acting RONALD as outpatient.  Monitor for now.    - Type 2 diabetes with complications.  -- Hypokalemia: replaced.  Getting kcl 20meq/day for now.    - Hypomagnesemia, being replaced.  - Hyperphosphatemia, relatively poor p.o. intake, decreased sevelamer to 1 p.o. 3 times daily with meals.  Discussed with daughter and nursing staff.  Will follow.    Electronically signed by Coreen Castro MD, 03/08/24, 3:35 PM  EST.

## 2024-03-08 NOTE — PROGRESS NOTES
Pulmonary / Critical Care Progress Note      Patient Name: Nelson Wang  : 1945  MRN: [id number]  Attending:  Saúl Vargas MD  Date of admission: 2024    Subjective   Subjective   Follow-up for pneumonia    Over the past 24 hours: Remains on Unasyn IV.  Continues Brovana, Pulmicort, and DuoNebs.  Remains on Demadex.  Mentation has been fluctuating.  Psychiatry was consulted.  Continued on Abilify.    No acute events overnight.     This morning,  Out of bed to chair.  On room air.  Oxygen saturation in mid 90s.  Denies any chest pain or chest tightness  Denies cough or hemoptysis  No fever or chills  Chest x-ray this morning with stable pleural effusion and pneumonia.    Objective   Objective     Vitals:   Temp:  [97.2 °F (36.2 °C)-98.1 °F (36.7 °C)] 97.9 °F (36.6 °C)  Heart Rate:  [84-93] 92  Resp:  [16-18] 18  BP: (122-151)/(53-79) 151/53  Flow (L/min):  [2] 2    Physical Exam   Vital Signs Reviewed   General:  WDWN, Alert, NAD. elderly male, out of bed to chair  HEENT:  PERRL, EOMI.  OP, nares clear  Chest: Diminished with coarse crackles at bases bilaterally, no work of breathing noted on room air  CV: RRR, no MGR, pulses 2+, equal.  Abd:  Soft, NT, ND, + BS  EXT:  no clubbing, no cyanosis, no edema  Neuro:  A&Ox3, CN grossly intact, no focal deficits.  Skin: No rashes or lesions noted, multiple wounds noted please see charting    Result Review    Result Review:  I have personally reviewed the results from the time of this admission to 3/8/2024 07:16 EST and agree with these findings:  [x]  Laboratory  [x]  Microbiology  [x]  Radiology  []  EKG/Telemetry   []  Cardiology/Vascular   []  Pathology  []  Old records  []  Other:  Most notable findings include:         Lab 24  0429 24  0746 24  0522 24  0415 24  0543 24  0255 24  1158 24  0527   WBC 5.45 4.80 4.52 4.23  --  6.13 6.62 7.45   HEMOGLOBIN 10.0* 10.1* 9.1* 10.2*  --  9.4* 8.8* 8.8*    HEMATOCRIT 34.8* 34.3* 30.3* 35.3*  --  31.3* 28.4* 29.4*   PLATELETS 193 184 172 141  --  117* 107* 128*   SODIUM 141 143 142 140  --  140 139 138   SODIUM, ARTERIAL  --   --   --   --  137.6  --   --   --    POTASSIUM 3.8 3.4* 3.2* 4.0  --  3.6 3.3* 3.5   CHLORIDE 101 103 104 101  --  102 102 102   CO2 29.0 28.5 26.7 23.6  --  25.3 24.2 26.0   BUN 29* 29* 35* 32*  --  34* 35* 35*   CREATININE 6.06* 6.28* 6.87* 6.11*  --  5.93* 6.36* 7.02*   GLUCOSE 252* 205* 196* 200*  --  209* 151* 142*   GLUCOSE, ARTERIAL  --   --   --   --  140*  --   --   --    CALCIUM 9.0 8.5* 8.1* 8.2*  --  8.2* 7.7* 7.9*   PHOSPHORUS 2.6 2.7 3.5  --   --  4.9*  --  5.3*   TOTAL PROTEIN 6.3 6.1 5.7*  --   --   --   --   --    ALBUMIN 2.8* 2.6* 2.3*  --   --   --   --   --      Bronchoscopy BAL negative.  Bronchoscopy note personally reviewed  Assessment & Plan   Assessment / Plan     Active Hospital Problems:  Active Hospital Problems    Diagnosis     **Acute on chronic respiratory failure with hypoxia     HCAP (healthcare-associated pneumonia)     ESRD (end stage renal disease)     Aspiration pneumonitis     Peritoneal dialysis catheter in place     Essential hypertension     Anemia due to chronic kidney disease     Type 2 diabetes mellitus without complication      Impression:    Acute hypoxic respiratory failure requiring supplemental oxygen  Concern for aspiration pneumonia  NSTEMI type I versus type II  ESRD on peritoneal dialysis  Anemia of chronic kidney disease  T2DM with hyperglycemia  Hypomagnesemia       Plan:    -Supplemental oxygen to keep saturation more than 90%.    -Check overnight oximetry.  -Chest x-ray with stable pneumonia and pleural effusion.  -Continue Unasyn for total 7 days in the setting of aspiration pneumonia  -Continue Brovana, Pulmicort, DuoNebs  -Continue chest physiotherapy  -Continue bronchopulmonary hygiene.  Encourage I-S and flutter  -Continue to monitor renal panel and electrolytes.  Electrolytes as  necessary  -Continue aspiration precautions.  Upright for all p.o. intake  -Speech pathology following, appreciate input.  -Peritoneal dialysis per nephrology, appreciate input.  -Continue delirium precautions and Seroquel for now.  Psychiatry service on board.  -Follow-up pulmonary clinic 1 to 2 weeks after discharge     DVT prophylaxis:  Medical DVT prophylaxis orders are present.    CODE STATUS:   Level Of Support Discussed With: Patient  Code Status (Patient has no pulse and is not breathing): CPR (Attempt to Resuscitate)  Medical Interventions (Patient has pulse or is breathing): Full Support    I have reviewed labs, imaging, pertinent clinical data and provider notes.   I have discussed with bedside nurse and primary service.     Electronically signed by Preston Jimenez MD, 03/08/24, 7:16 AM EST.

## 2024-03-09 ENCOUNTER — READMISSION MANAGEMENT (OUTPATIENT)
Dept: CALL CENTER | Facility: HOSPITAL | Age: 79
End: 2024-03-09
Payer: MEDICARE

## 2024-03-09 VITALS
WEIGHT: 154.32 LBS | TEMPERATURE: 97.4 F | SYSTOLIC BLOOD PRESSURE: 128 MMHG | OXYGEN SATURATION: 97 % | HEART RATE: 95 BPM | RESPIRATION RATE: 18 BRPM | HEIGHT: 66 IN | BODY MASS INDEX: 24.8 KG/M2 | DIASTOLIC BLOOD PRESSURE: 68 MMHG

## 2024-03-09 PROBLEM — N18.6 END STAGE RENAL DISEASE: Status: ACTIVE | Noted: 2023-04-27

## 2024-03-09 LAB
ALBUMIN SERPL-MCNC: 2.6 G/DL (ref 3.5–5.2)
ALP SERPL-CCNC: 55 U/L (ref 39–117)
ALT SERPL W P-5'-P-CCNC: 19 U/L (ref 1–41)
ANION GAP SERPL CALCULATED.3IONS-SCNC: 13 MMOL/L (ref 5–15)
AST SERPL-CCNC: 32 U/L (ref 1–40)
BASOPHILS # BLD AUTO: 0.12 10*3/MM3 (ref 0–0.2)
BASOPHILS NFR BLD AUTO: 1.8 % (ref 0–1.5)
BILIRUB CONJ SERPL-MCNC: <0.2 MG/DL (ref 0–0.3)
BILIRUB INDIRECT SERPL-MCNC: ABNORMAL MG/DL
BILIRUB SERPL-MCNC: 0.4 MG/DL (ref 0–1.2)
BUN SERPL-MCNC: 34 MG/DL (ref 8–23)
BUN/CREAT SERPL: 5.2 (ref 7–25)
CALCIUM SPEC-SCNC: 9.2 MG/DL (ref 8.6–10.5)
CHLORIDE SERPL-SCNC: 100 MMOL/L (ref 98–107)
CO2 SERPL-SCNC: 28 MMOL/L (ref 22–29)
CREAT SERPL-MCNC: 6.52 MG/DL (ref 0.76–1.27)
DEPRECATED RDW RBC AUTO: 61 FL (ref 37–54)
EGFRCR SERPLBLD CKD-EPI 2021: 8.1 ML/MIN/1.73
EOSINOPHIL # BLD AUTO: 0.34 10*3/MM3 (ref 0–0.4)
EOSINOPHIL NFR BLD AUTO: 5.2 % (ref 0.3–6.2)
ERYTHROCYTE [DISTWIDTH] IN BLOOD BY AUTOMATED COUNT: 16.9 % (ref 12.3–15.4)
GLUCOSE BLDC GLUCOMTR-MCNC: 151 MG/DL (ref 70–99)
GLUCOSE SERPL-MCNC: 157 MG/DL (ref 65–99)
HCT VFR BLD AUTO: 34.8 % (ref 37.5–51)
HGB BLD-MCNC: 10.3 G/DL (ref 13–17.7)
IMM GRANULOCYTES # BLD AUTO: 0.08 10*3/MM3 (ref 0–0.05)
IMM GRANULOCYTES NFR BLD AUTO: 1.2 % (ref 0–0.5)
LYMPHOCYTES # BLD AUTO: 2.11 10*3/MM3 (ref 0.7–3.1)
LYMPHOCYTES NFR BLD AUTO: 32 % (ref 19.6–45.3)
MAGNESIUM SERPL-MCNC: 1.5 MG/DL (ref 1.6–2.4)
MCH RBC QN AUTO: 29.1 PG (ref 26.6–33)
MCHC RBC AUTO-ENTMCNC: 29.6 G/DL (ref 31.5–35.7)
MCV RBC AUTO: 98.3 FL (ref 79–97)
MONOCYTES # BLD AUTO: 0.93 10*3/MM3 (ref 0.1–0.9)
MONOCYTES NFR BLD AUTO: 14.1 % (ref 5–12)
NEUTROPHILS NFR BLD AUTO: 3.01 10*3/MM3 (ref 1.7–7)
NEUTROPHILS NFR BLD AUTO: 45.7 % (ref 42.7–76)
NRBC BLD AUTO-RTO: 0 /100 WBC (ref 0–0.2)
PHOSPHATE SERPL-MCNC: 2.7 MG/DL (ref 2.5–4.5)
PLATELET # BLD AUTO: 209 10*3/MM3 (ref 140–450)
PMV BLD AUTO: 11.5 FL (ref 6–12)
POTASSIUM SERPL-SCNC: 4.4 MMOL/L (ref 3.5–5.2)
PROT SERPL-MCNC: 6.5 G/DL (ref 6–8.5)
RBC # BLD AUTO: 3.54 10*6/MM3 (ref 4.14–5.8)
SODIUM SERPL-SCNC: 141 MMOL/L (ref 136–145)
WBC NRBC COR # BLD AUTO: 6.59 10*3/MM3 (ref 3.4–10.8)

## 2024-03-09 PROCEDURE — 63710000001 INSULIN REGULAR HUMAN PER 5 UNITS: Performed by: FAMILY MEDICINE

## 2024-03-09 PROCEDURE — 94669 MECHANICAL CHEST WALL OSCILL: CPT

## 2024-03-09 PROCEDURE — 94664 DEMO&/EVAL PT USE INHALER: CPT

## 2024-03-09 PROCEDURE — 80076 HEPATIC FUNCTION PANEL: CPT | Performed by: FAMILY MEDICINE

## 2024-03-09 PROCEDURE — 83735 ASSAY OF MAGNESIUM: CPT | Performed by: FAMILY MEDICINE

## 2024-03-09 PROCEDURE — 94799 UNLISTED PULMONARY SVC/PX: CPT

## 2024-03-09 PROCEDURE — 82948 REAGENT STRIP/BLOOD GLUCOSE: CPT

## 2024-03-09 PROCEDURE — 25010000002 HEPARIN (PORCINE) PER 1000 UNITS: Performed by: INTERNAL MEDICINE

## 2024-03-09 PROCEDURE — 93005 ELECTROCARDIOGRAM TRACING: CPT | Performed by: PHYSICIAN ASSISTANT

## 2024-03-09 PROCEDURE — 80048 BASIC METABOLIC PNL TOTAL CA: CPT | Performed by: FAMILY MEDICINE

## 2024-03-09 PROCEDURE — 85025 COMPLETE CBC W/AUTO DIFF WBC: CPT | Performed by: FAMILY MEDICINE

## 2024-03-09 PROCEDURE — 94761 N-INVAS EAR/PLS OXIMETRY MLT: CPT

## 2024-03-09 PROCEDURE — 84100 ASSAY OF PHOSPHORUS: CPT | Performed by: FAMILY MEDICINE

## 2024-03-09 PROCEDURE — 25010000002 LORAZEPAM PER 2 MG: Performed by: PHYSICIAN ASSISTANT

## 2024-03-09 RX ORDER — CHOLECALCIFEROL (VITAMIN D3) 125 MCG
10 CAPSULE ORAL NIGHTLY
Qty: 60 TABLET | Refills: 0 | Status: SHIPPED | OUTPATIENT
Start: 2024-03-09 | End: 2024-04-08

## 2024-03-09 RX ORDER — ASPIRIN 81 MG/1
81 TABLET ORAL DAILY
Qty: 30 TABLET | Refills: 0 | Status: SHIPPED | OUTPATIENT
Start: 2024-03-10 | End: 2024-04-09

## 2024-03-09 RX ORDER — CARVEDILOL 6.25 MG/1
6.25 TABLET ORAL 2 TIMES DAILY
Qty: 60 TABLET | Refills: 0 | Status: SHIPPED | OUTPATIENT
Start: 2024-03-09 | End: 2024-04-08

## 2024-03-09 RX ORDER — ARFORMOTEROL TARTRATE 15 UG/2ML
15 SOLUTION RESPIRATORY (INHALATION)
Qty: 120 ML | Refills: 0 | Status: SHIPPED | OUTPATIENT
Start: 2024-03-09 | End: 2024-03-09

## 2024-03-09 RX ORDER — ATORVASTATIN CALCIUM 40 MG/1
40 TABLET, FILM COATED ORAL DAILY
Qty: 30 TABLET | Refills: 0 | Status: SHIPPED | OUTPATIENT
Start: 2024-03-09 | End: 2024-04-08

## 2024-03-09 RX ORDER — IPRATROPIUM BROMIDE AND ALBUTEROL SULFATE 2.5; .5 MG/3ML; MG/3ML
3 SOLUTION RESPIRATORY (INHALATION) EVERY 4 HOURS PRN
Qty: 360 ML | Refills: 0 | Status: SHIPPED | OUTPATIENT
Start: 2024-03-09

## 2024-03-09 RX ORDER — PANTOPRAZOLE SODIUM 40 MG/1
40 TABLET, DELAYED RELEASE ORAL
Qty: 30 TABLET | Refills: 0 | Status: SHIPPED | OUTPATIENT
Start: 2024-03-10 | End: 2024-04-09

## 2024-03-09 RX ORDER — BUDESONIDE 0.5 MG/2ML
0.5 INHALANT ORAL
Qty: 120 ML | Refills: 0 | Status: SHIPPED | OUTPATIENT
Start: 2024-03-09 | End: 2024-04-08

## 2024-03-09 RX ORDER — IPRATROPIUM BROMIDE AND ALBUTEROL SULFATE 2.5; .5 MG/3ML; MG/3ML
3 SOLUTION RESPIRATORY (INHALATION) EVERY 4 HOURS PRN
Qty: 360 ML | Refills: 0 | Status: SHIPPED | OUTPATIENT
Start: 2024-03-09 | End: 2024-03-09

## 2024-03-09 RX ORDER — BENZONATATE 100 MG/1
100 CAPSULE ORAL 3 TIMES DAILY PRN
Qty: 30 CAPSULE | Refills: 0 | Status: SHIPPED | OUTPATIENT
Start: 2024-03-09

## 2024-03-09 RX ORDER — TORSEMIDE 100 MG/1
100 TABLET ORAL DAILY
Qty: 30 TABLET | Refills: 0 | Status: SHIPPED | OUTPATIENT
Start: 2024-03-10 | End: 2024-04-09

## 2024-03-09 RX ORDER — LORAZEPAM 2 MG/ML
0.25 INJECTION INTRAMUSCULAR EVERY 6 HOURS PRN
Status: DISCONTINUED | OUTPATIENT
Start: 2024-03-09 | End: 2024-03-09 | Stop reason: HOSPADM

## 2024-03-09 RX ORDER — SEVELAMER CARBONATE 800 MG/1
800 TABLET, FILM COATED ORAL 2 TIMES DAILY WITH MEALS
Qty: 60 TABLET | Refills: 0 | Status: SHIPPED | OUTPATIENT
Start: 2024-03-09 | End: 2024-04-08

## 2024-03-09 RX ORDER — ALLOPURINOL 100 MG/1
100 TABLET ORAL DAILY
Qty: 30 TABLET | Refills: 0 | Status: SHIPPED | OUTPATIENT
Start: 2024-03-09 | End: 2024-04-08

## 2024-03-09 RX ORDER — FAMOTIDINE 20 MG/1
20 TABLET, FILM COATED ORAL NIGHTLY
Qty: 30 TABLET | Refills: 0 | Status: SHIPPED | OUTPATIENT
Start: 2024-03-09 | End: 2024-04-08

## 2024-03-09 RX ORDER — LEVOTHYROXINE SODIUM 0.15 MG/1
150 TABLET ORAL
Qty: 30 TABLET | Refills: 0 | Status: SHIPPED | OUTPATIENT
Start: 2024-03-10 | End: 2024-04-09

## 2024-03-09 RX ORDER — SACCHAROMYCES BOULARDII 250 MG
250 CAPSULE ORAL 2 TIMES DAILY
Qty: 60 CAPSULE | Refills: 0 | Status: SHIPPED | OUTPATIENT
Start: 2024-03-09 | End: 2024-04-08

## 2024-03-09 RX ORDER — GENTAMICIN SULFATE 1 MG/G
1 CREAM TOPICAL DAILY
Qty: 30 G | Refills: 0 | Status: SHIPPED | OUTPATIENT
Start: 2024-03-10

## 2024-03-09 RX ORDER — ARFORMOTEROL TARTRATE 15 UG/2ML
15 SOLUTION RESPIRATORY (INHALATION)
Qty: 120 ML | Refills: 0 | Status: SHIPPED | OUTPATIENT
Start: 2024-03-09 | End: 2024-04-08

## 2024-03-09 RX ORDER — ARIPIPRAZOLE 2 MG/1
2 TABLET ORAL NIGHTLY
Qty: 30 TABLET | Refills: 0 | Status: SHIPPED | OUTPATIENT
Start: 2024-03-09 | End: 2024-04-08

## 2024-03-09 RX ORDER — LORAZEPAM 0.5 MG/1
0.5 TABLET ORAL NIGHTLY PRN
Qty: 3 TABLET | Refills: 0 | Status: SHIPPED | OUTPATIENT
Start: 2024-03-09 | End: 2024-03-12

## 2024-03-09 RX ADMIN — BENZONATATE 100 MG: 100 CAPSULE ORAL at 10:05

## 2024-03-09 RX ADMIN — INSULIN HUMAN 2 UNITS: 100 INJECTION, SOLUTION PARENTERAL at 08:26

## 2024-03-09 RX ADMIN — NYSTATIN 500000 UNITS: 100000 SUSPENSION ORAL at 08:26

## 2024-03-09 RX ADMIN — LORAZEPAM 0.5 MG: 2 INJECTION INTRAMUSCULAR; INTRAVENOUS at 00:13

## 2024-03-09 RX ADMIN — ASPIRIN 81 MG: 81 TABLET, COATED ORAL at 08:27

## 2024-03-09 RX ADMIN — LEVOTHYROXINE SODIUM 150 MCG: 0.15 TABLET ORAL at 06:07

## 2024-03-09 RX ADMIN — PANTOPRAZOLE SODIUM 40 MG: 40 TABLET, DELAYED RELEASE ORAL at 06:07

## 2024-03-09 RX ADMIN — BUDESONIDE 0.5 MG: 0.5 INHALANT RESPIRATORY (INHALATION) at 07:54

## 2024-03-09 RX ADMIN — IPRATROPIUM BROMIDE AND ALBUTEROL SULFATE 3 ML: .5; 3 SOLUTION RESPIRATORY (INHALATION) at 07:54

## 2024-03-09 RX ADMIN — HEPARIN SODIUM 5000 UNITS: 5000 INJECTION INTRAVENOUS; SUBCUTANEOUS at 06:07

## 2024-03-09 RX ADMIN — ATORVASTATIN CALCIUM 40 MG: 40 TABLET, FILM COATED ORAL at 08:27

## 2024-03-09 RX ADMIN — POTASSIUM CHLORIDE 20 MEQ: 1500 TABLET, EXTENDED RELEASE ORAL at 08:26

## 2024-03-09 RX ADMIN — SEVELAMER CARBONATE 800 MG: 800 TABLET, FILM COATED ORAL at 08:26

## 2024-03-09 RX ADMIN — Medication 250 MG: at 08:26

## 2024-03-09 RX ADMIN — ARFORMOTEROL TARTRATE 15 MCG: 15 SOLUTION RESPIRATORY (INHALATION) at 07:56

## 2024-03-09 RX ADMIN — TORSEMIDE 100 MG: 20 TABLET ORAL at 08:26

## 2024-03-09 RX ADMIN — GENTAMICIN SULFATE 1 APPLICATION: 1 CREAM TOPICAL at 08:30

## 2024-03-09 NOTE — SIGNIFICANT NOTE
Patient with considerable agitation overnight. See separate nursing note for further details.     Discussed with psychiatry regarding recommendations of PRNs to calm patient.     Most recent EKG reviewed, QTc was prolonged. Ideally would obtain repeat EKG now, however we are concerned that it would further agitate patient.     Psych recommended Ativan PRN if QTc prolonged or haldol q6h PRN if qtc WNL for agitation. Due to lack of EKG, Ativan ordered. Family requested 0.5mg be given. Patient rested well following dose. Order decreased to 0.25mg ativan PRN for agitation     Repeat EKG for this a.m. ordered to monitor QTc when hopefully patient is calmer.    Electronically signed by EDSON Antony, 03/09/24, 1:15 AM EST.

## 2024-03-09 NOTE — SIGNIFICANT NOTE
03/09/24 1018   Plan   Final Discharge Disposition Code 06 - home with home health care   Final Note Home with [facility].  Nebullizer ordered and taken to patients room

## 2024-03-09 NOTE — PLAN OF CARE
Goal Outcome Evaluation:   Patient alert, oriented to himself and his family at bedside currently. The patient's orientation declined throughout the night, see below nursing note. The patient started off the night a&ox4, calm and cooperative, then progressed to becoming agitated and confused. The patient now has the continuous pulse ox back on and and his daughter at bedside has reconnected him to his dialysis cycler to drain. Daughter at bedside does not want the flex monitor replaced or morning labs drawn at this time in order to not further agitate the patient.

## 2024-03-09 NOTE — NURSING NOTE
Patient wanted to get up to bedside commode at 2300 to have a bm, this RN and patient's daughter LEONEL Wang MD assisted patient to bedside commode, daughter stepped out to allow for privacy. When patient finished, he did not want to return to bed. His daughter and staff attempted to negotiate with the patient about returning to bed which just further agitated him. Patient also very confused about where he's at and the situation, reorientation unsuccessful. Patient proceeded to remove his flex monitor, continuous pulse ox, and his daughter disconnected him from his dialysis cycler, while patient walked in the hallway and went into 4028 where he rested for a bit. Christina SANDHU came to bedside to assess patient and notified psych MD mack. 0.5mg ativan IV ordered and administered to patient per MAR. Patient now resting again in 4022, daughter does not want staff to attempt to connect patient back to monitors in order to allow him more time to calm down. , charge RN, and respiratory therapist aware.

## 2024-03-09 NOTE — PROGRESS NOTES
Patient only wore Sleep study for approximately 30 mins. Became agitated and confused and took it off. Per daughter, pt will not agree to be placed back on unit. She stated we need to try again maybe tonight.

## 2024-03-09 NOTE — PLAN OF CARE
Goal Outcome Evaluation:  Plan of Care Reviewed With: patient, daughter, family           Outcome Evaluation: pt aox4, on room air. pt ambulating throughout the hallway with assist x1. pt medicated per the MAR. glucose monitored. pt discharging home with family, pts belongings sent with family at bedside. midline removed. no complaints at this time

## 2024-03-09 NOTE — OUTREACH NOTE
Prep Survey      Flowsheet Row Responses   [facility] patient discharged from? [facility]   Is LACE score < 7 ? No   Eligibility [facility]   Date of Admission 02/28/24   Date of Discharge 03/09/24   Discharge Disposition Home-Health Care S[facility]   Discharge diagnosis Acute on chronic respiratory failure with hypoxia- HCAP (healthcare-associated pneumonia)   Does the patient have one of the following disease processes/diagnoses(primary or secondary)? Pneumonia   Does the patient have Home health ordered? Yes   What is the Home health agency?  [facility]   Is there a DME ordered? No   Prep survey completed? Yes            Veronica FRAZIER - Registered Nurse

## 2024-03-09 NOTE — PROGRESS NOTES
" [facility]     Psychiatric Progress Note    Patient Name: Nelson Wang  : 1945  MRN: [id number]  Primary Care Physician:  Janna Mo MD  Date of admission: 2024    Subjective   Subjective     Patient seen and chart reviewed, discussed with staff.    Chief Complaint: Hypomania, confusion      HPI:     Patient sitting up in chair.  He is pleasant and cooperative.  He is oriented to person, place, as well as month and year.  He is able to discuss his care in and appropriate fashion. His insight is good.     He acknowledges feeling briefly anxious yesterday evening but shares his belief that \"some staff make a big deal of this than it really is.\" Patient reports that a dose of Lorazepam last night was helpful for this anxiety. He also reports benefit from Aripiprazole. He denies any side effects. He describes his mood as good and says his energy levels and strength are improving. He reports resting well last night. He denies SI/HI, paranoia. When asked about AVH, he states he may have had a \"hypnagogic hallucination\" last night, stating he was dreaming of dogs and thought he heard dogs barking as he was waking up. He denies any other AVH. He demonstrates insight into this being a part of a dream and denies hearing or seeing dogs once he was awake. Patient is able to describe his treatment plan and says he has support from family. He reports he is breathing well today and denies chest pain.       Objective   Objective     Vitals:   Temp:  [97.4 °F (36.3 °C)-98.1 °F (36.7 °C)] 97.4 °F (36.3 °C)  Heart Rate:  [] 95  Resp:  [18] 18  BP: (127-148)/(53-87) 128/68          Mental Status Exam:      Appearance:   Well-developed, well-nourished, calm, cooperative, sitting up in chair at bedside  Reliability:   Good  Eye Contact:   Good  Concentration/Focus:    Attentive to the interview  Behaviors:    No restlessness or agitation  Memory :    Sensorium intact  Speech:    Normal rate and volume, " spontaneous  Language:   Appropriate, relevant  Mood : Euthymic  Affect:    Full range, reactive  Thought process:    Goal directed, linear  Thought Content:    Denies suicidal or homicidal ideation, no hallucinations   Insight:   Good  Judgement:    Intact, no behavioral disturbance      Result Review    Result Review:  I have personally reviewed the results from the time of this admission to 3/9/2024 13:46 EST and agree with these findings:  [x]  Laboratory  []  Microbiology  []  Radiology  []  EKG/Telemetry   []  Cardiology/Vascular   []  Pathology  []  Old records  []  Other:      Lab Results (last 24 hours)       Procedure Component Value Units Date/Time    Basic Metabolic Panel [[id number]]  (Abnormal) Collected: 03/09/24 0809    Specimen: Blood from Arm, Left Updated: 03/09/24 0843     Glucose 157 mg/dL      BUN 34 mg/dL      Creatinine 6.52 mg/dL      Sodium 141 mmol/L      Potassium 4.4 mmol/L      Comment: Slight hemolysis detected by analyzer. Result may be falsely elevated.        Chloride 100 mmol/L      CO2 28.0 mmol/L      Calcium 9.2 mg/dL      BUN/Creatinine Ratio 5.2     Anion Gap 13.0 mmol/L      eGFR 8.1 mL/min/1.73      Comment: <15 Indicative of kidney failure       Narrative:      GFR Normal >60  Chronic Kidney Disease <60  Kidney Failure <15    The GFR formula is only valid for adults with stable renal function between ages 18 and 70.    Magnesium [[id number]]  (Abnormal) Collected: 03/09/24 0809    Specimen: Blood from Arm, Left Updated: 03/09/24 0843     Magnesium 1.5 mg/dL     Phosphorus [[id number]]  (Normal) Collected: 03/09/24 0809    Specimen: Blood from Arm, Left Updated: 03/09/24 0843     Phosphorus 2.7 mg/dL     Hepatic Function Panel [[id number]]  (Abnormal) Collected: 03/09/24 0809    Specimen: Blood from Arm, Left Updated: 03/09/24 0843     Total Protein 6.5 g/dL      Albumin 2.6 g/dL      ALT (SGPT) 19 U/L      AST (SGOT) 32 U/L      Alkaline Phosphatase 55 U/L      Total Bilirubin  0.4 mg/dL      Bilirubin, Direct <0.2 mg/dL      Bilirubin, Indirect --     Comment: Unable to calculate       CBC & Differential [[id number]]  (Abnormal) Collected: 03/09/24 0809    Specimen: Blood from Arm, Left Updated: 03/09/24 0819    Narrative:      The following orders were created for panel order CBC & Differential.  Procedure                               Abnormality         Status                     ---------                               -----------         ------                     CBC Auto Differential[[id number]]        Abnormal            Final result                 Please view results for these tests on the individual orders.    CBC Auto Differential [[id number]]  (Abnormal) Collected: 03/09/24 0809    Specimen: Blood from Arm, Left Updated: 03/09/24 0819     WBC 6.59 10*3/mm3      RBC 3.54 10*6/mm3      Hemoglobin 10.3 g/dL      Hematocrit 34.8 %      MCV 98.3 fL      MCH 29.1 pg      MCHC 29.6 g/dL      RDW 16.9 %      RDW-SD 61.0 fl      MPV 11.5 fL      Platelets 209 10*3/mm3      Neutrophil % 45.7 %      Lymphocyte % 32.0 %      Monocyte % 14.1 %      Eosinophil % 5.2 %      Basophil % 1.8 %      Immature Grans % 1.2 %      Neutrophils, Absolute 3.01 10*3/mm3      Lymphocytes, Absolute 2.11 10*3/mm3      Monocytes, Absolute 0.93 10*3/mm3      Eosinophils, Absolute 0.34 10*3/mm3      Basophils, Absolute 0.12 10*3/mm3      Immature Grans, Absolute 0.08 10*3/mm3      nRBC 0.0 /100 WBC     POC Glucose Once [[id number]]  (Abnormal) Collected: 03/09/24 0748    Specimen: Blood Updated: 03/09/24 0750     Glucose 151 mg/dL      Comment: Serial Number: [id number]:  [id number]       POC Glucose Once [[id number]]  (Abnormal) Collected: 03/08/24 2034    Specimen: Blood Updated: 03/08/24 2036     Glucose 234 mg/dL      Comment: Serial Number: [id number]:  [id number]       POC Glucose Once [[id number]]  (Abnormal) Collected: 03/08/24 1714    Specimen: Blood Updated: 03/08/24 1715     Glucose 194 mg/dL       Comment: Serial Number: [id number]:  [id number]                   Medications:   !Patient Home Medications Stored on Unit, , Does not apply, BID  arformoterol, 15 mcg, Nebulization, BID - RT  ARIPiprazole, 2 mg, Oral, Nightly  aspirin, 81 mg, Oral, Daily  atorvastatin, 40 mg, Oral, Daily  budesonide, 0.5 mg, Nebulization, BID - RT  famotidine, 20 mg, Oral, Nightly  gentamicin, 1 Application, Topical, Daily  heparin (porcine), 5,000 Units, Subcutaneous, Q8H  dianeal lo-peter 1.5%, 6,000 mL, Intraperitoneal, Daily  insulin regular, 2-9 Units, Subcutaneous, 4x Daily AC & at Bedtime  levothyroxine, 150 mcg, Oral, Q AM  magnesium oxide, 800 mg, Oral, Once  melatonin, 5 mg, Oral, Nightly  nystatin, 5 mL, Swish & Swallow, 4x Daily  pantoprazole, 40 mg, Oral, Q AM  potassium chloride, 20 mEq, Oral, Daily  saccharomyces boulardii, 250 mg, Oral, BID  sevelamer, 800 mg, Oral, BID With Meals  sodium chloride, 10 mL, Intravenous, Q12H  torsemide, 100 mg, Oral, Daily          Assessment / Plan       Active Hospital Problems:  Active Hospital Problems    Diagnosis     **Acute on chronic respiratory failure with hypoxia     HCAP (healthcare-associated pneumonia)     Aspiration pneumonitis     End stage renal disease     Peritoneal dialysis catheter in place     Essential hypertension     Anemia due to chronic kidney disease     Type 2 diabetes mellitus without complication        Plan:     I agree with the previous plan recommended:     Continue aripiprazole to bedtime  Continue as needed lorazepam for insomnia  No need for acute inpatient psych and may discharge to rehab

## 2024-03-09 NOTE — DISCHARGE SUMMARY
[facility]         HOSPITALIST  DISCHARGE SUMMARY    Patient Name: Nelson Wang  : 1945  MRN: [id number]    Date of Admission: 2024  Date of Discharge:  3/9/2024    Primary Care Physician: Janna Mo MD    Consults       Date and Time Order Name Status Description    3/6/2024  7:42 AM Inpatient Psychiatrist Consult      3/3/2024  8:13 AM Inpatient Psychiatrist Consult      2024  4:12 AM Inpatient Nephrology Consult      2024  4:08 AM Inpatient Cardiology Consult      2024  3:48 AM Inpatient Pulmonology Consult      2024 12:40 AM Hospitalist (on-call MD unless specified)              Active and Resolved Hospital Problems:    Acute on chronic hypoxic respiratory failure  Concern for worsening multifocal pneumonia versus aspiration pneumonia  Mucous plugging of the airway  Right pleural effusion  Elevated troponin unsure if type I or type II NSTEMI  End-stage renal disease on peritoneal dialysis  Diabetes mellitus  Hypertension  Hypothyroidism  Recent E. coli bacteremia  Recent C. difficile colitis  Delirium versus acute toxic encephalopathy due to poor clearance of medication in the setting of ESRD  Concern for manic episode with underlying bipolar disorder    Active Hospital Problems    Diagnosis POA    **Acute on chronic respiratory failure with hypoxia [J96.21] Yes    HCAP (healthcare-associated pneumonia) [J18.9] Yes    Aspiration pneumonitis [J69.0] Yes    End stage renal disease [N18.6] Yes    Peritoneal dialysis catheter in place [Z99.2] Not Applicable    Essential hypertension [I10] Yes    Anemia due to chronic kidney disease [N18.9, D63.1] Yes    Type 2 diabetes mellitus without complication [E11.9] Yes      Resolved Hospital Problems   No resolved problems to display.       Hospital Course     Hospital Course:  79-year-old male with history of diabetes, ESRD on PD, psoriasis, on monoclonal antibody therapy, immunosuppression secondary to Skyrizi,  hypertension, dyslipidemia, recent C. difficile infection, hearing impairment, bipolar, CAD, vertigo, GERD without esophagitis, recent E. coli bacteremia with septic shock, C. difficile colitis, peritonitis, pneumonia, discharged with PICC line, to complete course of antibiotics, was arrived back into the hospital with worsening shortness of breath, volume overloaded, worsening progression of multifocal pneumonia, bilateral pleural effusions, pulmonary consulted, nephrology consulted, bronchoscopy performed, mucous plugging removed, increasing confused and agitated, required sedating medications to be initiated, psychiatry consulted, patient finally rested after several days, did have some decompensation in breathing, pulmonary following, started on Abilify, seems to be helping.  Discharged in hemodynamically stable addition on 3/9/2024 to follow-up with subspecialist in the outpatient setting.  Home health were arranged at the time of discharge.        Day of Discharge     Vital Signs:  Temp:  [97.4 °F (36.3 °C)-98.1 °F (36.7 °C)] 97.4 °F (36.3 °C)  Heart Rate:  [] 95  Resp:  [18] 18  BP: (127-148)/(53-87) 128/68  Review of systems:  All systems reviewed and negative except for weakness, fatigue, cough, shortness of breath with exertion    Physical Exam             Constitutional: Awake, alert, communicating appropriately, alert and oriented x 3 sitting  Eyes: Pupils equal, sclerae anicteric, no conjunctival injection  HENT: NCAT, mucous membranes moist  Neck: Supple, full range of motion  Respiratory: Diminished coarse breath sounds with no rhonchi or wheezing, very fine crackles on the right side  Cardiovascular: RRR, no murmurs, rubs, or gallops, palpable pedal pulses bilaterally  Gastrointestinal: Positive bowel sounds, soft, nontender, distended, PD catheter in place   Musculoskeletal: No bilateral ankle edema, no clubbing or cyanosis to extremities  Psychiatric: Calm and cooperative  Neurologic:  Oriented x 3, strength symmetric in all extremities, Cranial Nerves grossly intact to confrontation, speech clear  Skin: No rashes visible on exposed skin        Discharge Details        Discharge Medications        New Medications        Instructions Start Date   arformoterol 15 MCG/2ML nebulizer solution  Commonly known as: BROVANA   15 mcg, Nebulization, 2 Times Daily - RT      ARIPiprazole 2 MG tablet  Commonly known as: ABILIFY   2 mg, Oral, Nightly      aspirin 81 MG EC tablet   81 mg, Oral, Daily   Start Date: March 10, 2024     benzonatate 100 MG capsule  Commonly known as: TESSALON   100 mg, Oral, 3 Times Daily PRN      budesonide 0.5 MG/2ML nebulizer solution  Commonly known as: PULMICORT   0.5 mg, Nebulization, 2 Times Daily - RT      famotidine 20 MG tablet  Commonly known as: PEPCID   20 mg, Oral, Nightly      gentamicin 0.1 % cream  Commonly known as: GARAMYCIN   1 Application, Topical, Daily   Start Date: March 10, 2024     ipratropium-albuterol 0.5-2.5 mg/3 ml nebulizer  Commonly known as: DUO-NEB   3 mL, Nebulization, Every 4 Hours PRN      LORazepam 0.5 MG tablet  Commonly known as: ATIVAN   0.5 mg, Oral, Nightly PRN      melatonin 5 MG tablet tablet   10 mg, Oral, Nightly      pantoprazole 40 MG EC tablet  Commonly known as: PROTONIX   40 mg, Oral, Every Early Morning   Start Date: March 10, 2024     saccharomyces boulardii 250 MG capsule  Commonly known as: FLORASTOR   250 mg, Oral, 2 Times Daily             Changes to Medications        Instructions Start Date   allopurinol 100 MG tablet  Commonly known as: ZYLOPRIM  What changed:   medication strength  how much to take   100 mg, Oral, Daily      carvedilol 6.25 MG tablet  Commonly known as: COREG  What changed:   medication strength  how much to take   6.25 mg, Oral, 2 Times Daily      Insulin Glargine 100 UNIT/ML injection pen  Commonly known as: LANTUS SOLOSTAR  What changed: how much to take   5 Units, Subcutaneous, Nightly       levothyroxine 150 MCG tablet  Commonly known as: SYNTHROID, LEVOTHROID  What changed:   medication strength  how much to take  when to take this   150 mcg, Oral, Every Early Morning   Start Date: March 10, 2024     sevelamer 800 MG tablet  Commonly known as: RENVELA  What changed: how much to take   800 mg, Oral, 2 Times Daily With Meals      torsemide 100 MG tablet  Commonly known as: DEMADEX  What changed:   medication strength  how much to take  when to take this  additional instructions   100 mg, Oral, Daily   Start Date: March 10, 2024            Continue These Medications        Instructions Start Date   atorvastatin 40 MG tablet  Commonly known as: LIPITOR   40 mg, Oral, Daily      nystatin 100,000 unit/mL suspension  Commonly known as: MYCOSTATIN   500,000 Units, Swish & Swallow, 4 Times Daily      SKYRIZI (150 MG DOSE) SC   Subcutaneous, Once every 3 months              Stop These Medications      ascorbic acid 1000 MG tablet  Commonly known as: VITAMIN C     cefTRIAXone 1 g injection  Commonly known as: ROCEPHIN     fish oil 1000 MG capsule capsule     Lidocaine (Anorectal) 5 % cream cream  Commonly known as: LMX 5     sertraline 100 MG tablet  Commonly known as: ZOLOFT     sodium bicarbonate 650 MG tablet     Turmeric 500 MG capsule              No Known Allergies    Discharge Disposition:  Home-Health Care [facility]    Diet:  Hospital:  Diet Order   Procedures    Diet: Cardiac, Diabetic; Low Sodium (2g); Consistent Carbohydrate; No Straw; Texture: Soft to Chew (NDD 3); Soft to Chew: Whole Meat; Fluid Consistency: Nectar Thick       Discharge Activity: as tolerates      CODE STATUS:  Code Status and Medical Interventions:   Ordered at: 02/29/24 0215     Level Of Support Discussed With:    Patient     Code Status (Patient has no pulse and is not breathing):    CPR (Attempt to Resuscitate)     Medical Interventions (Patient has pulse or is breathing):    Full Support         Future Appointments   Date Time  Provider Department Center   3/12/2024  9:00 AM Josephine Warren APRN [facility]   4/10/2024 10:45 AM Armando Guallpa DPM [facility]       Additional Instructions for the Follow-ups that You Need to Schedule       Discharge Follow-up with PCP   As directed       Currently Documented PCP:    Janna Mo MD    PCP Phone Number:    [phone]     Follow Up Details: 3 to 7 days                Pertinent  and/or Most Recent Results     PROCEDURES:   XR Chest 1 View    Result Date: 3/8/2024  PROCEDURE: XR CHEST 1 VW  COMPARISON: [facility], CT, CT CHEST WO CONTRAST DIAGNOSTIC, 2/28/2024, 23:01.  [facility], CR, XR CHEST 1 VW, 3/04/2024, 5:33.  INDICATIONS: pneumonia, pleural effusion  FINDINGS:  Lung volumes remain low.  There is chronic elevation of the right hemidiaphragm.  Heart is enlarged.  There is vascular congestion.  There is some persistent infiltrate or atelectasis at the right base, and a small amount of right pleural fluid may be present.  No pneumothorax.       Persistent low lung volumes with cardiomegaly and vascular congestion.  Persistent right basilar infiltrate or atelectasis with a small right effusion.       ERIC ELLISON MD       Electronically Signed and Approved By: ERIC ELLISON MD on 3/08/2024 at 5:48             CT Head Without Contrast    Result Date: 3/5/2024  PROCEDURE: CT HEAD WO CONTRAST  COMPARISON:  None INDICATIONS: Mental status change, unknown cause, confusion  PROTOCOL:   Standard imaging protocol performed    RADIATION:   DLP: 1312mGy*cm   MA and/or KV was adjusted to minimize radiation dose.     TECHNIQUE: After obtaining the patient's consent, CT images were obtained without non-ionic intravenous contrast material.  FINDINGS:  Motion artifact limits evaluation.  No intra or extra-axial fluid collections, masses, or areas of hemorrhage are identified.  There is prominence of the ventricles and sulci, consistent with atrophy.   There is periventricular hypodensity consistent with changes of chronic microvascular ischemic disease.  No aggressive appearing lytic or sclerotic bone lesions are identified.        1. Atrophy and changes of chronic microvascular ischemic disease, but no acute intracranial pathology.     EMILY SOTO MD       Electronically Signed and Approved By: EMILY SOTO MD on 3/05/2024 at 16:02             XR Chest 1 View    Result Date: 3/4/2024  PROCEDURE: XR CHEST 1 VW  COMPARISON: [facility], CR, XR CHEST 1 VW, 2/28/2024, 21:42.  [facility], CT, CT CHEST WO CONTRAST DIAGNOSTIC, 2/28/2024, 23:01.  INDICATIONS: SOA  FINDINGS:  Heart remains enlarged.  There is chronic elevation of the right hemidiaphragm.  Right pleural effusion is slightly worsened.  There are increasing infiltrates on both sides of the chest, particularly in the lower right lung.  No pneumothorax.       Worsening of bilateral infiltrates with worsening of a right pleural effusion.       ERIC ELLISON MD       Electronically Signed and Approved By: ERIC ELLISON MD on 3/04/2024 at 6:12             CT Chest Without Contrast Diagnostic    Result Date: 2/29/2024  PROCEDURE: CT CHEST WO CONTRAST DIAGNOSTIC  COMPARISON: 2/28/2024; 2/22/2024; 2/18/2024; 2/16/2024  INDICATIONS: Hypoxia.  TECHNIQUE: 607 multiplanar CT images were created without the administration of contrast material.  Despite best efforts, poor image quality limits interpretation of the study, especially due to patient respiratory motion artifacts.  Per the performing CT technologist, the patient was unable to follow the breathing directions.  PROTOCOL:   Standard imaging protocol performed.    RADIATION:   Total DLP: 365.3 mGy*cm.   Automated exposure control was utilized to minimize radiation dose.  FINDINGS: Worsening infectious multifocal pneumonia is suspected, greatest within the right lobe lower with dense pulmonary consolidation.  New pulmonary  consolidation involves the right upper lobe.  To a much lesser degree, infiltrates and/or atelectasis involve(s) the left lung.  There is anteroposterior (AP) narrowing of the trachea and mainstem (right greater than left) bronchi.  New occlusion/opacification of the right lower lobe bronchi is (are) noted and may be related to mucous plugging.  Aspirated content cannot be excluded.  An endobronchial lesion would be in the differential diagnosis.  Consider bronchoscopy for further assessment if not already performed.  There are small bilateral effusions.  There is a trace amount of pericardial effusion.  Extensive atherosclerotic changes are seen, including involvement of the coronary arteries, thoracic aorta, and the great arteries.  The left vertebral artery arises directly from aortic arch, which is a normal variant.  Gallstones are seen with probably no acute cholecystitis.  There may be a small amount of ascites within the partially imaged upper abdomen.  Overall, the amount of ascites has decreased since the prior study.  No definite acute pancreatitis.  There is a small hiatal hernia.  Severe degenerative changes are seen throughout the imaged spine.  Multiple chronic vertebral compression fractures are seen, especially at T10 and T12, as seen previously.  No pneumothorax.  No pneumomediastinum.  There may be some degree anasarca.  There is suspected slight chronic asymmetric elevation the right diaphragm.       Worsening progression is suggested by CT increased bilateral pulmonary consolidation, much greater on the right than the left.  The findings suggest infectious multifocal pneumonia.  Aspiration pneumonia be excluded (especially in the right lower lobe).  New or increased pleural effusions are seen (small in volume).  Gallstones are present without definite acute cholecystitis.  Please see above comments for further detail.     Please note that portions of this note were completed with a voice  recognition program.  АНДРЕЙ DE LUNA JR, MD       Electronically Signed and Approved By: АНДРЕЙ DE LUNA JR, MD on 2/29/2024 at 0:10              XR Chest 1 View    Result Date: 2/28/2024  PROCEDURE: XR CHEST 1 VW  COMPARISON: [facility], CR, XR CHEST 1 VW, 2/16/2024, 16:51.  [facility], CT, CT CHEST WO CONTRAST DIAGNOSTIC, 2/18/2024, 9:48.  INDICATIONS: SHORT OF BREATH  FINDINGS:  Right diaphragm remains elevated and there is improving right basilar density.  The right IJ central venous catheter has been removed.  Left lung is grossly clear. Cardiac, hilar, and mediastinal silhouettes are stable with cardiomegaly.  Pulmonary vascularity is within range of normal. No pneumothorax or pleural effusion. Bony structures are intact.  The trachea is midline.           Improving right basilar density compatible with persistent but resolving pneumonia.  The right IJ central venous catheter has been removed.  No new findings.       RENITA BARRAZA DO       Electronically Signed and Approved By: RENITA BARRAZA DO on 2/28/2024 at 22:19             CT Abdomen Pelvis With Contrast    Result Date: 2/22/2024  PROCEDURE: CT ABDOMEN PELVIS W CONTRAST  COMPARISON: [facility], CT, CT ABDOMEN PELVIS WO CONTRAST, 2/16/2024, 14:16.  INDICATIONS: ABDOMINAL PAIN, DIARRHEA  TECHNIQUE: After obtaining the patient's consent, CT images were created with non-ionic intravenous contrast material.   PROTOCOL:   Standard imaging protocol performed    RADIATION:   DLP: 769 mGy*cm   Automated exposure control was utilized to minimize radiation dose. CONTRAST: 100cc Isovue 370 I.V. oral contrast administered as well.  FINDINGS:  There is continued right lower lobe pneumonia.  There is a new tiny right pleural effusion.  There is bilateral gynecomastia.  There is coronary artery disease in addition to diffuse atherosclerotic disease.  No aortic aneurysm.  Gallstones are present in the gallbladder.  No biliary  obstruction.  There is bilateral renal cortical thinning, and there are bilateral renal cysts.  No hydronephrosis is identified.  A large stone is again seen in the right renal pelvis measuring about 12 mm in length, unchanged.  The solid abdominal organs are otherwise within normal limits.  Urinary bladder is not well distended but is grossly normal.  Prostate unremarkable.  Right hip arthroplasty is present.  Peritoneal dialysis catheter is coiled in the pelvis.  Free fluid throughout the abdomen and pelvis is presumed dialysate.  Volume has increased since the prior study.  A few scattered bubbles of free intraperitoneal air are also presumed related to the dialysis catheter.  There is a small umbilical hernia containing some fluid.  The appendix is surgically absent.  There is colonic diverticulosis without definite focal diverticulitis.  No definitive evidence of acute colitis.  No small bowel obstruction or evidence of enteritis.  The stomach is normal.  No adenopathy is identified.  Degenerative change in the lumbar spine and visualized thoracic spine is stable.  Chronic compression fracture at L3 is unchanged.        1. Persistent right lower lobe pneumonia within new tiny right pleural effusion. 2. No clearly acute findings in the GI tract.  No evidence of acute colitis or enteritis.  No bowel obstruction.  There is colonic diverticulosis without diverticulitis. 3. Stable nonobstructing large stone in the right renal pelvis.  No hydronephrosis on either side. 4. Cholelithiasis without biliary obstruction. 5. Increase volume of free fluid in the abdomen and pelvis, likely dialysate. 6. Appendectomy and right hip arthroplasty.      ERIC ELLISON MD       Electronically Signed and Approved By: ERIC ELLISON MD on 2/22/2024 at 22:44             FL Esophagram w Modified Barium Swallow Single Contrast    Result Date: 2/19/2024  PROCEDURE: FL ESOPHOGRAM COMP WITH MODIFIED BARIUM SWALLOW SINGLE CONTRAST   "COMPARISON:  None  INDICATIONS: RLL pna, rule out aspiration/ 124.9 mGy/ 3.3 min fluoro time/ 20 images  FINDINGS:  Under fluoroscopic observation, a double-contrast esophagram was performed. Images of the thoracic esophagus were obtained.  The thoracic esophagus appeared of normal contour and caliber.  The thoracic esophageal mucosa appeared without evidence of mass, narrowing, inflammatory or ulcerative change.  No hiatal hernia witnessed.  There is proximal escape of the primary swallowed bolus with non-peristaltic tertiary contractions consistent with presbyesophagus.  At times, this resulted in poor clearance of contrast from the esophagus into the stomach resulting in a \"to and fro\" motion that patient was able to appreciate during exam.  This more so occurred when patient was in prone HILL position.  No witnessed spontaneous or provoked gastroesophageal reflux on exam.  Prominent cricopharyngeal bar appreciated on exam when in prone HILL position, which produces an AP diameter narrowing of approximately 50%.  Fluoroscopic examination was performed in conjunction with speech pathology department.  Various consistencies of barium were given to assess the swallow mechanism.  Patient experienced laryngeal penetration and silent tracheal aspiration with thin liquid barium.  The exam was discussed in real-time by myself and the speech pathologist. Please consult speech pathology report for further details regarding exam.      1. Presbyesophagus 2. Prominent cricopharyngeal bar 3. Laryngeal penetration and silent tracheal aspiration with thin liquid barium     HUYEN SANDHU       Electronically Signed and Approved By: Angel Luis Barrientos M.D. on 2/19/2024 at 17:24             Adult Transthoracic Echo Complete W/ Cont if Necessary Per Protocol    Result Date: 2/19/2024    Left ventricular systolic function is normal. Calculated left ventricular EF = 56.6%   Left ventricular wall thickness is consistent with mild " concentric hypertrophy.   Left ventricular diastolic function is consistent with (grade I) impaired relaxation and age.   Aortic valve sclerosis with minimal aortic valve stenosis is present.     CT Chest Without Contrast Diagnostic    Result Date: 2/18/2024  PROCEDURE: CT CHEST WO CONTRAST DIAGNOSTIC  COMPARISON: [facility], CT, CT CHEST WO CONTRAST DIAGNOSTIC, 12/06/2023, 11:11.  [facility], CT, CT ABDOMEN PELVIS WO CONTRAST, 2/16/2024, 14:16.  INDICATIONS: SHORTNESS OF BREATH  PROTOCOL:   Standard imaging protocol performed    RADIATION:   DLP: 313 mGy*cm   Automated exposure control was utilized to minimize radiation dose.  TECHNIQUE: Axial images of the chest without intravenous contrast.  FINDINGS: Slight improvement in right lower lobe airspace consolidation.  Areas of ground-glass opacity in the right mid and lower lung field appear stable.  No cavitary lesions are identified.  Stable pneumoperitoneum and abdominal free fluid likely related to peritoneal dialysis.  Coronary artery calcification is present.  The thoracic aorta has a normal caliber.  There is no mediastinal, axillary or hilar adenopathy.  Right IJ central line tip is in the SVC.  There is mild gynecomastia.  No evidence of pneumothorax.  No evidence of significant pleural or pericardial effusion.  Degenerative changes are present in the thoracic spine.  IMPRESSION:  Slight improvement in the right lower lobe airspace consolidation.  EPIFANIO RENTERIA MD       Electronically Signed and Approved By: EPIFANIO RENTERIA MD on 2/18/2024 at 10:04             XR Chest 1 View    Result Date: 2/16/2024  PROCEDURE: XR CHEST 1 VW  COMPARISON: [facility], CT, CT ABDOMEN PELVIS WO CONTRAST, 2/16/2024, 14:16.  [facility], CR, XR CHEST 1 VW, 2/16/2024, 13:50.  INDICATIONS: central line placement  FINDINGS:   New right IJ central line with the tip in the region of the distal SVC.  No evidence of  pneumothorax.  The heart and pulmonary vasculature are within normal limits.  There is right lower lobe airspace consolidation consistent with pneumonia.   IMPRESSION: 1. New right IJ central line in good position.  No pneumothorax.  2. Right lower lobe airspace consolidation consistent with pneumonia.   EPIFANIO RENTERIA MD       Electronically Signed and Approved By: EPIFANIO RENTERIA MD on 2/16/2024 at 17:12             CT Abdomen Pelvis Without Contrast    Result Date: 2/16/2024  PROCEDURE: CT ABDOMEN PELVIS WO CONTRAST  COMPARISON: [facility], CR, XR CHEST 1 VW, 2/16/2024, 13:50.  [facility], CR, XR CHEST PA AND LATERAL, 9/19/2023, 10:40.  [facility], CR, XR CHEST 1 VW, 2/16/2024, 7:55.  [facility], CT, CT CHEST WO CONTRAST DIAGNOSTIC, 12/06/2023, 11:11.  INDICATIONS: peritonitis, persistent fever  TECHNIQUE: CT images were created without intravenous contrast.   PROTOCOL:   Standard imaging protocol performed    RADIATION:   DLP: 268mGy*cm   Automated exposure control was utilized to minimize radiation dose.  FINDINGS:  Extensive right lower lobe pneumonia.  Left lung base is clear.  Coronary artery calcifications present.  Lack of contrast limits assessment of abdominal organs and vasculature.  The liver is normal in size and contour.  There is elevation of the right hemidiaphragm.  Spleen and adrenal glands are unremarkable.  Pancreas without findings of pancreatitis.  Gallbladder present.  No pericholecystic inflammation.  Several low-attenuation bilateral renal lesions likely cysts, similar to the prior study, incompletely assessed without contrast.  There is a chronic calculus at the right renal pelvis which measures 1.1 cm x 0.8 cm, unchanged.  Negative for hydronephrosis or hydroureter.  No obstructing distal ureteral calculus.  Urinary bladder is collapsed.  No bowel obstruction.  Colonic diverticulosis without diverticulitis.   Pneumoperitoneum in the abdomen similar to prior chest CT likely related to patient's peritoneal dialysis catheter which is located in the pelvis.  No fluid collection surrounding the dialysis catheter.  Stomach and proximal small bowel are normally configured.  No findings of appendicitis.  Extensive vascular calcifications of the abdominal aorta and branch vasculature without aneurysm.  Chronic height loss at the superior endplate of L3 with partial ankylosis at the L2-3 level.  Chronic retropulsion of the posterior aspect of the mid L3 vertebral body to the canal contribute to suspected moderate canal stenosis at L3.  No aggressive osseous lesion or acute fracture.  Right hip prosthesis partially imaged.        1. Extensive right lower lobe pneumonia. 2. Pneumoperitoneum similar to prior chest CT likely related to peritoneal dialysis catheter.  No organized fluid collection or abscess. 3. Nonobstructing 11 mm calculus at the right renal pelvis unchanged. 4. Additional chronic findings above.     LISY SANTILLAN MD       Electronically Signed and Approved By: LISY SANTILLAN MD on 2/16/2024 at 14:46             XR Chest 1 View    Result Date: 2/16/2024  PROCEDURE: XR CHEST 1 VW  COMPARISON: [facility], CT, CT ABDOMEN PELVIS WO CONTRAST, 2/16/2024, 14:16.  [facility], CR, XR CHEST 1 VW, 2/16/2024, 7:55.  INDICATIONS: worsining hypoxia  FINDINGS:  Unchanged cardiomediastinal silhouette.  Low lung volumes.  Right lower lung airspace opacity.  The no large pleural effusion or visible pneumothorax.  Pneumoperitoneum.  No acute osseous abnormality.  Probable healed left-sided rib fractures.       Right lower lobe airspace opacity consistent with pneumonia.  Pneumoperitoneum, better seen on same day CT of the abdomen/pelvis.       JOSH HAMPTON MD       Electronically Signed and Approved By: JOSH HAMPTON MD on 2/16/2024 at 14:30             XR Chest 1 View    Result Date: 2/16/2024  PROCEDURE: XR CHEST  1 VW  COMPARISON: [facility], CR, XR CHEST PA AND LATERAL, 9/19/2023, 10:40.  [facility], CT, CT CHEST WO CONTRAST DIAGNOSTIC, 12/06/2023, 11:11.  INDICATIONS: Fever  FINDINGS:  Heart size normal.  Mild elevation the right hemidiaphragm, stable.  Lungs without consolidation.  Negative for pneumothorax or pleural effusion.  External catheter tubing overlies the upper abdomen.  Aortic arch atherosclerosis.        No acute process.       LISY SANTILLAN MD       Electronically Signed and Approved By: LISY SANTILLAN MD on 2/16/2024 at 8:07                LAB RESULTS:      Lab 03/09/24  0809 03/08/24  0429 03/07/24  0746 03/06/24  0522 03/05/24  0415 03/04/24  0609 03/04/24  0543   WBC 6.59 5.45 4.80 4.52 4.23  --   --    HEMOGLOBIN 10.3* 10.0* 10.1* 9.1* 10.2*  --   --    HEMATOCRIT 34.8* 34.8* 34.3* 30.3* 35.3*  --   --    PLATELETS 209 193 184 172 141  --   --    NEUTROS ABS 3.01 2.31 1.93 1.82  --   --   --    IMMATURE GRANS (ABS) 0.08* 0.05 0.02 0.03  --   --   --    LYMPHS ABS 2.11 1.87 1.76 1.71  --   --   --    MONOS ABS 0.93* 0.88 0.84 0.74  --   --   --    EOS ABS 0.34 0.25 0.19 0.17  --   --   --    MCV 98.3* 99.7* 98.6* 97.7* 102.0*  --   --    PROCALCITONIN  --  0.46*  --   --   --   --   --    LACTATE  --   --   --   --   --  1.3  --    LACTATE, ARTERIAL  --   --   --   --   --   --  1.18         Lab 03/09/24  0809 03/08/24  0429 03/07/24  0746 03/06/24  0522 03/05/24  0415 03/04/24  0543 03/04/24  0255   SODIUM 141 141 143 142 140  --  140   SODIUM, ARTERIAL  --   --   --   --   --  137.6  --    POTASSIUM 4.4 3.8 3.4* 3.2* 4.0  --  3.6   CHLORIDE 100 101 103 104 101  --  102   CO2 28.0 29.0 28.5 26.7 23.6  --  25.3   ANION GAP 13.0 11.0 11.5 11.3 15.4*  --  12.7   BUN 34* 29* 29* 35* 32*  --  34*   CREATININE 6.52* 6.06* 6.28* 6.87* 6.11*  --  5.93*   EGFR 8.1* 8.9* 8.5* 7.6* 8.8*  --  9.1*   GLUCOSE 157* 252* 205* 196* 200*  --  209*   GLUCOSE, ARTERIAL  --   --    --   --   --  140*  --    CALCIUM 9.2 9.0 8.5* 8.1* 8.2*  --  8.2*   IONIZED CALCIUM  --   --   --   --   --  1.09*  --    MAGNESIUM 1.5* 1.8 2.0 1.5*  --   --  1.7   PHOSPHORUS 2.7 2.6 2.7 3.5  --   --  4.9*   TSH  --   --   --  8.500*  --   --   --          Lab 03/09/24  0809 03/08/24  0429 03/07/24  0746 03/06/24  0522   TOTAL PROTEIN 6.5 6.3 6.1 5.7*   ALBUMIN 2.6* 2.8* 2.6* 2.3*   ALT (SGPT) 19 16 13 13   AST (SGOT) 32 26 23 24   BILIRUBIN 0.4 0.3 0.4 0.4   BILIRUBIN DIRECT <0.2 <0.2 <0.2 <0.2   ALK PHOS 55 58 54 52                     Lab 03/04/24  0543   PH, ARTERIAL 7.322*   PCO2, ARTERIAL 50.2*   PO2 .6*   O2 SATURATION ART 98.7   HCO3 ART 25.4   BASE EXCESS ART -1.0   CARBOXYHEMOGLOBIN 0.3     Brief Urine Lab Results  (Last result in the past 365 days)        Color   Clarity   Blood   Leuk Est   Nitrite   Protein   CREAT   Urine HCG        02/16/24 1127 Dark Yellow   Clear   Negative   Negative   Negative   100 mg/dL (2+)                 Microbiology Results (last 10 days)       Procedure Component Value - Date/Time    Respiratory Culture - Wash, Bronchus [[id number]] Collected: 03/01/24 0932    Lab Status: Final result Specimen: Wash from Bronchus Updated: 03/03/24 1133     Respiratory Culture Moderate growth (3+) Normal respiratory lashae. No S. aureus or Pseudomonas aeruginosa detected. Final report.     Gram Stain Few (2+) Gram positive cocci in pairs and chains      Few (2+) WBCs seen    Fungus Culture - Lavage, Lung, Right Lower Lobe [[id number]] Collected: 03/01/24 0922    Lab Status: Preliminary result Specimen: Lavage from Lung, Right Lower Lobe Updated: 03/08/24 1015     Fungus Culture No fungus isolated at 1 week    Virus Culture - Lavage, Lung, Right Lower Lobe [[id number]] Collected: 03/01/24 0922    Lab Status: Preliminary result Specimen: Lavage from Lung, Right Lower Lobe Updated: 03/07/24 1410     Viral Culture, General Comment     Comment: Preliminary Report:  No virus isolated at 4  days.  Next report to follow after 7 days.       Narrative:      Performed at:  01 - [facility]  [id number]  : Maricarmen Mensah MD, Phone:  [phone]    AFB Culture - Lavage, Lung, Right Lower Lobe [[id number]] Collected: 03/01/24 0922    Lab Status: Preliminary result Specimen: Lavage from Lung, Right Lower Lobe Updated: 03/08/24 1015     AFB Culture No AFB isolated at 1 week     AFB Stain No acid fast bacilli seen on direct smear      No acid fast bacilli seen on concentrated smear    BAL Culture, Quantitative - Lavage, Lung, Right Lower Lobe [[id number]] Collected: 03/01/24 0922    Lab Status: Final result Specimen: Lavage from Lung, Right Lower Lobe Updated: 03/03/24 1132     BAL Culture 50,000 CFU/mL Normal respiratory lashae. No S. aureus or Pseudomonas aeruginosa detected. Final report.     Gram Stain Few (2+) Gram positive cocci in pairs and chains      Rare (1+) WBCs seen    Pneumonia Panel - Lavage, Lung, Right Lower Lobe [[id number]]  (Normal) Collected: 03/01/24 0922    Lab Status: Final result Specimen: Lavage from Lung, Right Lower Lobe Updated: 03/01/24 1145     Escherichia coli PCR Not Detected     Acinetobacter calcoaceticus-baumannii complex PCR Not Detected     Enterobacter cloacae PCR Not Detected     Klebsiella oxytoca PCR Not Detected     Klebsiella pneumoniae group PCR Not Detected     Klebsiella aerogenes PCR Not Detected     Moraxella catarrhalis PCR Not Detected     Proteus species PCR Not Detected     Pseudomonas aeroginosa PCR Not Detected     Serratia marcescens PCR Not Detected     Staphylococcus aureus PCR Not Detected     Streptococcus pyogenes PCR Not Detected     Haemophilus influenzae PCR Not Detected     Streptococcus agalactiae PCR Not Detected     Streptococcus pneumoniae PCR Not Detected     Chlamydophila pneumoniae PCR Not Detected     Legionella pneumophilia PCR Not Detected     Mycoplasma pneumo by PCR Not Detected      ADENOVIRUS, PCR Not Detected     CTX-M Gene N/A     IMP Gene N/A     KPC Gene N/A     mecA/C and MREJ Gene N/A     NDM Gene N/A     OXA-48-like Gene N/A     VIM Gene N/A     Coronavirus Not Detected     Human Metapneumovirus Not Detected     Human Rhinovirus/Enterovirus Not Detected     Influenza A PCR Not Detected     Influenza B PCR Not Detected     RSV, PCR Not Detected     Parainfluenza virus PCR Not Detected    Cytomegalovirus, PCR - Lavage, Lung, Right Lower Lobe [[id number]] Collected: 03/01/24 0922    Lab Status: Final result Specimen: Lavage from Lung, Right Lower Lobe Updated: 03/06/24 1901     Source Comment     Comment: BAL, RLL        Cytomegalovirus PCR Negative     Comment: -------------------ADDITIONAL INFORMATION-------------------  This test was developed and its performance characteristics  determined by [facility] in a manner consistent with CLIA  requirements. This test has not been cleared or approved by  the [place]. Food and Drug Administration.       Narrative:      Performed at:  01 - [facility] [address]  [id number]  : Galileo Jeong , Phone:  [phone]    Respiratory Panel PCR w/COVID-19(SARS-CoV-2) RA/TANIA/GAVIN/PAD/COR/NENA In-House, NP Swab in UTM/VTM, 2 HR TAT - Swab, Nasopharynx [[id number]]  (Normal) Collected: 02/29/24 0959    Lab Status: Final result Specimen: Swab from Nasopharynx Updated: 02/29/24 1131     ADENOVIRUS, PCR Not Detected     Coronavirus 229E Not Detected     Coronavirus HKU1 Not Detected     Coronavirus NL63 Not Detected     Coronavirus OC43 Not Detected     COVID19 Not Detected     Human Metapneumovirus Not Detected     Human Rhinovirus/Enterovirus Not Detected     Influenza A PCR Not Detected     Influenza B PCR Not Detected     Parainfluenza Virus 1 Not Detected     Parainfluenza Virus 2 Not Detected     Parainfluenza Virus 3 Not Detected     Parainfluenza Virus 4 Not Detected     RSV, PCR Not Detected      Bordetella pertussis pcr Not Detected     Bordetella parapertussis PCR Not Detected     Chlamydophila pneumoniae PCR Not Detected     Mycoplasma pneumo by PCR Not Detected    Narrative:      In the setting of a positive respiratory panel with a viral infection PLUS a negative procalcitonin without other underlying concern for bacterial infection, consider observing off antibiotics or discontinuation of antibiotics and continue supportive care. If the respiratory panel is positive for atypical bacterial infection (Bordetella pertussis, Chlamydophila pneumoniae, or Mycoplasma pneumoniae), consider antibiotic de-escalation to target atypical bacterial infection.    MRSA Screen, PCR (Inpatient) - Swab, Nares [[id number]]  (Normal) Collected: 02/29/24 0557    Lab Status: Final result Specimen: Swab from Nares Updated: 02/29/24 0826     MRSA PCR No MRSA Detected    Narrative:      The negative predictive value of this diagnostic test is high and should only be used to consider de-escalating anti-MRSA therapy. A positive result may indicate colonization with MRSA and must be correlated clinically.    COVID-19, FLU A/B, RSV PCR 1 HR TAT - Swab, Nasopharynx [[id number]]  (Normal) Collected: 02/28/24 2201    Lab Status: Final result Specimen: Swab from Nasopharynx Updated: 02/28/24 2244     COVID19 Not Detected     Influenza A PCR Not Detected     Influenza B PCR Not Detected     RSV, PCR Not Detected    Narrative:      Fact sheet for providers: https://www.fda.gov/media/185246/download    Fact sheet for patients: https://www.fda.gov/media/897434/download    Test performed by PCR.    Blood Culture - Blood, Hand, Left [[id number]]  (Normal) Collected: 02/28/24 2150    Lab Status: Final result Specimen: Blood from Hand, Left Updated: 03/04/24 2200     Blood Culture No growth at 5 days    Blood Culture - Blood, Hand, Left [[id number]]  (Normal) Collected: 02/28/24 2150    Lab Status: Final result Specimen: Blood from Hand, Left  Updated: 03/04/24 2200     Blood Culture No growth at 5 days            XR Chest 1 View    Result Date: 3/8/2024  Impression:  Persistent low lung volumes with cardiomegaly and vascular congestion.  Persistent right basilar infiltrate or atelectasis with a small right effusion.       ERIC ELLISON MD       Electronically Signed and Approved By: ERIC ELLISON MD on 3/08/2024 at 5:48             CT Head Without Contrast    Result Date: 3/5/2024  Impression:   1. Atrophy and changes of chronic microvascular ischemic disease, but no acute intracranial pathology.     EMILY SOTO MD       Electronically Signed and Approved By: EMILY SOTO MD on 3/05/2024 at 16:02             XR Chest 1 View    Result Date: 3/4/2024  Impression:  Worsening of bilateral infiltrates with worsening of a right pleural effusion.       ERIC ELLISON MD       Electronically Signed and Approved By: ERIC ELLISON MD on 3/04/2024 at 6:12             CT Chest Without Contrast Diagnostic    Result Date: 2/29/2024  Impression:  Worsening progression is suggested by CT increased bilateral pulmonary consolidation, much greater on the right than the left.  The findings suggest infectious multifocal pneumonia.  Aspiration pneumonia be excluded (especially in the right lower lobe).  New or increased pleural effusions are seen (small in volume).  Gallstones are present without definite acute cholecystitis.  Please see above comments for further detail.     Please note that portions of this note were completed with a voice recognition program.  АНДРЕЙ DE LUNA JR, MD       Electronically Signed and Approved By: АНДРЕЙ DE LUNA JR, MD on 2/29/2024 at 0:10              XR Chest 1 View    Result Date: 2/28/2024  Impression:  Improving right basilar density compatible with persistent but resolving pneumonia.  The right IJ central venous catheter has been removed.  No new findings.       RENITA BARRAZA DO       Electronically Signed and Approved By: RENITA  DO CHRISTI on 2/28/2024 at 22:19             CT Abdomen Pelvis With Contrast    Result Date: 2/22/2024  Impression:   1. Persistent right lower lobe pneumonia within new tiny right pleural effusion. 2. No clearly acute findings in the GI tract.  No evidence of acute colitis or enteritis.  No bowel obstruction.  There is colonic diverticulosis without diverticulitis. 3. Stable nonobstructing large stone in the right renal pelvis.  No hydronephrosis on either side. 4. Cholelithiasis without biliary obstruction. 5. Increase volume of free fluid in the abdomen and pelvis, likely dialysate. 6. Appendectomy and right hip arthroplasty.      ERIC ELLISON MD       Electronically Signed and Approved By: ERIC ELLISON MD on 2/22/2024 at 22:44             FL Esophagram w Modified Barium Swallow Single Contrast    Result Date: 2/19/2024  Impression: 1. Presbyesophagus 2. Prominent cricopharyngeal bar 3. Laryngeal penetration and silent tracheal aspiration with thin liquid barium     HUYEN SANDHU       Electronically Signed and Approved By: Angel Luis Barrientos M.D. on 2/19/2024 at 17:24             CT Abdomen Pelvis Without Contrast    Result Date: 2/16/2024  Impression:   1. Extensive right lower lobe pneumonia. 2. Pneumoperitoneum similar to prior chest CT likely related to peritoneal dialysis catheter.  No organized fluid collection or abscess. 3. Nonobstructing 11 mm calculus at the right renal pelvis unchanged. 4. Additional chronic findings above.     LISY SANTILLAN MD       Electronically Signed and Approved By: LISY SANTILLAN MD on 2/16/2024 at 14:46             XR Chest 1 View    Result Date: 2/16/2024  Impression:  Right lower lobe airspace opacity consistent with pneumonia.  Pneumoperitoneum, better seen on same day CT of the abdomen/pelvis.       JOSH HAMPTON MD       Electronically Signed and Approved By: JOSH HAMPTON MD on 2/16/2024 at 14:30             XR Chest 1 View    Result Date: 2/16/2024  Impression:   No acute  process.       LISY SANTILLAN MD       Electronically Signed and Approved By: LISY SANTILLAN MD on 2/16/2024 at 8:07                      Results for orders placed during the hospital encounter of 02/16/24    Adult Transthoracic Echo Complete W/ Cont if Necessary Per Protocol    Interpretation Summary    Left ventricular systolic function is normal. Calculated left ventricular EF = 56.6%    Left ventricular wall thickness is consistent with mild concentric hypertrophy.    Left ventricular diastolic function is consistent with (grade I) impaired relaxation and age.    Aortic valve sclerosis with minimal aortic valve stenosis is present.      Labs Pending at Discharge:  Pending Labs       Order Current Status    AFB Culture - Lavage, Lung, Right Lower Lobe Preliminary result    Fungus Culture - Lavage, Lung, Right Lower Lobe Preliminary result    Virus Culture - Lavage, Lung, Right Lower Lobe Preliminary result              Time spent on Discharge including face to face service:  35 minutes    Electronically signed by Saúl Vargas MD, 03/09/24, 1:28 PM EST.    Portions of this documentation were transcribed electronically from a voice recognition software.  I confirm all data accurately represents the service(s) I performed at today's visit.

## 2024-03-10 LAB — VIRUS SPEC CULT: NORMAL

## 2024-03-11 ENCOUNTER — TRANSITIONAL CARE MANAGEMENT TELEPHONE ENCOUNTER (OUTPATIENT)
Dept: CALL CENTER | Facility: HOSPITAL | Age: 79
End: 2024-03-11
Payer: MEDICARE

## 2024-03-11 LAB
QT INTERVAL: 444 MS
QTC INTERVAL: 555 MS

## 2024-03-11 NOTE — OUTREACH NOTE
Call Center TCM Note      Flowsheet Row Responses   [facility] facility patient discharged from? [facility]   Does the patient have one of the following disease processes/diagnoses(primary or secondary)? Pneumonia   TCM attempt successful? No   Unsuccessful attempts Attempt 2            Estephania Arzate RN    3/11/2024, 14:40 EDT

## 2024-03-11 NOTE — PROGRESS NOTES
"Enter Query Response Below      Query Response: Bacterial pneumonia unspecified and aspiration pneumonia   Electronically signed by Saúl Vargas MD, 24, 10:07 AM EDT.               If applicable, please update the problem list.   Patient: Nelson Wang        : 1945  Account: [id number]           Admit Date: 2024        How to Respond to this query:       a. Click New Note     b. Answer query within the yellow box.                c. Update the Problem List, if applicable.      If you have any questions about this query contact me at: [email or web address]       Dr. Vargas,     The 3/9/24 Discharge Summary notes \"Concern for worsening multifocal pneumonia versus aspiration pneumonia.\"  Risk factors include recent hospitalization, ESRD, medication related immunosuppression secondary to Skyrizi, and diabetes.  Treatment has included Vancomycin , Cefepime -3/1, and Unasyn 3/2-3/8.     24 Influenza A/B PCR Not Detected, Novel Coronavirus Not Detected, RSV PCR Not Detected,   24 MRSA PCR-No MRSA Detected; Procalcitonin 0.73; Respiratory Panel-Not Detected.   3/1/24 BAL Culture- Normal Lilian; Cytomegalovirus PCR Negative; Pneumonia Panel Negative; Respiratory Culture Normal Lilian; Viral Culture-no virus isolated.   3/8/24 Procalcitonin 0.46    Please clarify the type(s) of pneumonia the patient was treated/monitored for:    Gram negative pneumonia (excluding Haemophilus influenzae) and aspiration pneumonia  Bacterial pneumonia unspecified and aspiration pneumonia  Other- specify______  Unable to determine      By submitting this query, we are merely seeking further clarification of documentation to accurately reflect all conditions that you are monitoring, evaluating, treating or that extend the hospitalization or utilize additional resources of care. Please utilize your independent clinical judgment when addressing the question(s) above.     This query and your response, " once completed, will be entered into the legal medical record.    Sincerely,  Mari GUTIERREZ, RN, CCDS  Clinical Documentation Improvement Program  [email or web address]

## 2024-03-11 NOTE — OUTREACH NOTE
Call Center TCM Note      Flowsheet Row Responses   [facility] facility patient discharged from? [facility]   Does the patient have one of the following disease processes/diagnoses(primary or secondary)? Pneumonia   TCM attempt successful? No  [no current verbal release]   Unsuccessful attempts Attempt 1   Call Status Left message            Estephania Arzate RN    3/11/2024, 08:28 EDT

## 2024-03-12 ENCOUNTER — TRANSITIONAL CARE MANAGEMENT TELEPHONE ENCOUNTER (OUTPATIENT)
Dept: CALL CENTER | Facility: HOSPITAL | Age: 79
End: 2024-03-12
Payer: MEDICARE

## 2024-03-12 ENCOUNTER — OFFICE VISIT (OUTPATIENT)
Dept: PULMONOLOGY | Facility: CLINIC | Age: 79
End: 2024-03-12
Payer: MEDICARE

## 2024-03-12 VITALS
TEMPERATURE: 97.8 F | WEIGHT: 165.8 LBS | RESPIRATION RATE: 16 BRPM | OXYGEN SATURATION: 92 % | HEART RATE: 76 BPM | SYSTOLIC BLOOD PRESSURE: 115 MMHG | DIASTOLIC BLOOD PRESSURE: 54 MMHG | HEIGHT: 66 IN | BODY MASS INDEX: 26.65 KG/M2

## 2024-03-12 DIAGNOSIS — Z99.2 ESRD ON PERITONEAL DIALYSIS: ICD-10-CM

## 2024-03-12 DIAGNOSIS — J90 PLEURAL EFFUSION: ICD-10-CM

## 2024-03-12 DIAGNOSIS — N18.6 ESRD ON PERITONEAL DIALYSIS: ICD-10-CM

## 2024-03-12 DIAGNOSIS — J18.9 MULTIFOCAL PNEUMONIA: Primary | ICD-10-CM

## 2024-03-12 DIAGNOSIS — J69.0 RECURRENT ASPIRATION PNEUMONIA: ICD-10-CM

## 2024-03-12 DIAGNOSIS — T17.500A MUCUS PLUGGING OF BRONCHI: ICD-10-CM

## 2024-03-12 DIAGNOSIS — I51.89 GRADE I DIASTOLIC DYSFUNCTION: ICD-10-CM

## 2024-03-12 DIAGNOSIS — F17.211 NICOTINE DEPENDENCE, CIGARETTES, IN REMISSION: ICD-10-CM

## 2024-03-12 DIAGNOSIS — J96.01 ACUTE RESPIRATORY FAILURE WITH HYPOXIA: ICD-10-CM

## 2024-03-12 NOTE — OUTREACH NOTE
Call Center TCM Note      Flowsheet Row Responses   [facility] facility patient discharged from? [facility]   Does the patient have one of the following disease processes/diagnoses(primary or secondary)? Pneumonia   TCM attempt successful? No   Unsuccessful attempts Attempt 2            Estephania Arzate RN    3/12/2024, 09:16 EDT

## 2024-03-12 NOTE — PROGRESS NOTES
Primary Care Provider  Janna Mo MD     Referring Provider  No ref. provider found     Chief Complaint  Pneumonia, Shortness of Breath (With exertion ), and Hospital Follow Up Visit ([facility] 2/29-3/9 )    Subjective          Nelson Wang presents to [facility] PULMONARY & CRITICAL CARE MEDICINE  History of Present Illness  Nelson Wang is a 79 y.o. male patient recently admitted to [facility] from 2/28/2024 until 3/9/2024.  He is here for a post hospital follow up visit.    Patient's discharge summary, he has diabetes, end-stage renal disease on peritoneal dialysis, psoriasis, immunosuppression secondary to Skyrizi, hypertension, dyslipidemia, recent C. difficile infection, hearing impairment, bipolar, coronary artery disease, vertigo, GERD, peritonitis and pneumonia.  He was discharged with a PICC line to complete a course of antibiotics and had arrived back to the emergency room due to worsening shortness of breath and volume overload.  He also had worsening progression of multifocal pneumonia and bilateral pleural effusions.  A bronchoscopy was performed and mucous plugging was removed.  Patient did have an increase in confusion and agitation and did require needing medications.  He did have some decompensation in his breathing.  He was started on Abilify.  He was discharged home in stable condition with home health.    Patient states he is doing well since discharge from the hospital.  He denies any current fevers or chills, but states that he has had intermittent fevers for the past 6 months.  He states that he did have a fall and believes that he had some bruised ribs at that time.  He continues to use Pulmicort and Brovana as prescribed.  He states that his shortness of breath is mild in severity, worse with exertion and improved with rest.  He denies ever being diagnosed with COPD or asthma.  He denies any family history of lung conditions.  He does not have any  occupational exposure to chemicals.  He did have a swallow study while inpatient which showed laryngeal penetration and silent tracheal aspiration with thin liquid barium.  Patient states that he does not have any issues with coughing after meals or while lying flat.  Overall, patient states that he has not had any lung problems in the past and is doing well.  He is able to perform his ADLs without difficulty.  He is up-to-date with his COVID, flu and RSV vaccine.  He declines a pneumonia vaccine.       His history of smoking is   Tobacco Use: Medium Risk (3/12/2024)    Patient History     Smoking Tobacco Use: Former     Smokeless Tobacco Use: Never     Passive Exposure: Past   .    Review of Systems   Constitutional:  Negative for chills, fatigue, fever, unexpected weight gain and unexpected weight loss.   HENT:  Congestion: Nasal.    Respiratory:  Positive for shortness of breath. Negative for apnea, cough and wheezing.         Negative for Hemoptysis     Cardiovascular:  Negative for chest pain, palpitations and leg swelling.   Skin:         Negative for cyanosis      Sleep: Negative for Excessive daytime sleepiness  Negative for morning headaches  Negative for Snoring    Family History   Problem Relation Age of Onset    Diabetes Mother     Heart disease Father     Cancer Sister 35    Diabetes Sister     Diabetes Brother     Heart disease Paternal Uncle     Malig Hyperthermia Neg Hx         Social History     Socioeconomic History    Marital status:    Tobacco Use    Smoking status: Former     Current packs/day: 0.00     Average packs/day: 1 pack/day for 10.0 years (10.0 ttl pk-yrs)     Types: Cigarettes     Start date:      Quit date: 2000     Years since quittin.2     Passive exposure: Past    Smokeless tobacco: Never    Tobacco comments:     quit 25 years ago, chews nicotine gum   Vaping Use    Vaping status: Never Used   Substance and Sexual Activity    Alcohol use: Yes     Comment: 1  DRINK/DAY    Drug use: Never    Sexual activity: Defer        Past Medical History:   Diagnosis Date    Anemia     Ankle pain, right     CKD (chronic kidney disease), stage IV     Diabetes     Gout     High cholesterol     HL (hearing loss)     Hyperkalemia     Hypertension     Hypothyroidism     Psoriasis     Vitiligo         Immunization History   Administered Date(s) Administered    Arexvy (RSV, Adults 60+ yrs) 12/29/2023    COVID-19 (PFIZER) BIVALENT 12+YRS 09/19/2022, 08/21/2023    COVID-19 (PFIZER) Purple Cap Monovalent 08/22/2021, 10/04/2021, 09/30/2022    COVID-19 F23 (PFIZER) 12YRS+ (COMIRNATY) 12/06/2023    Fluad Quad 65+ 08/21/2023    Fluzone High-Dose 65+yrs 08/22/2021    Hepatitis B 2 Dose Vaccine Heplisav-B 04/18/2023, 05/16/2023, 07/07/2023, 08/02/2023    Influenza, Unspecified 08/22/2021, 09/30/2022         No Known Allergies       Current Outpatient Medications:     allopurinol (ZYLOPRIM) 100 MG tablet, Take 1 tablet by mouth Daily for 30 days., Disp: 30 tablet, Rfl: 0    arformoterol (BROVANA) 15 MCG/2ML nebulizer solution, Take 2 mL by nebulization 2 (Two) Times a Day for 30 days. Indications: Chronic Obstructive Lung Disease, J 44.9, Disp: 120 mL, Rfl: 0    ARIPiprazole (ABILIFY) 2 MG tablet, Take 1 tablet by mouth Every Night for 30 days., Disp: 30 tablet, Rfl: 0    aspirin 81 MG EC tablet, Take 1 tablet by mouth Daily for 30 days., Disp: 30 tablet, Rfl: 0    atorvastatin (LIPITOR) 40 MG tablet, Take 1 tablet by mouth Daily for 30 days., Disp: 30 tablet, Rfl: 0    benzonatate (TESSALON) 100 MG capsule, Take 1 capsule by mouth 3 (Three) Times a Day As Needed for Cough., Disp: 30 capsule, Rfl: 0    budesonide (PULMICORT) 0.5 MG/2ML nebulizer solution, Take 2 mL by nebulization 2 (Two) Times a Day for 30 days., Disp: 120 mL, Rfl: 0    carvedilol (COREG) 6.25 MG tablet, Take 1 tablet by mouth 2 (Two) Times a Day for 30 days., Disp: 60 tablet, Rfl: 0    famotidine (PEPCID) 20 MG tablet, Take 1 tablet  by mouth Every Night for 30 days., Disp: 30 tablet, Rfl: 0    gentamicin (GARAMYCIN) 0.1 % cream, Apply 1 Application topically to the appropriate area as directed Daily., Disp: 30 g, Rfl: 0    Insulin Glargine (LANTUS SOLOSTAR) 100 UNIT/ML injection pen, Inject 5 Units under the skin into the appropriate area as directed Every Night., Disp: 3 mL, Rfl: 0    ipratropium-albuterol (DUO-NEB) 0.5-2.5 mg/3 ml nebulizer, Take 3 mL by nebulization Every 4 (Four) Hours As Needed for Wheezing or Shortness of Air. Indications: Chronic Obstructive Lung Disease, J44.9, Disp: 360 mL, Rfl: 0    levothyroxine (SYNTHROID, LEVOTHROID) 150 MCG tablet, Take 1 tablet by mouth Every Morning for 30 days., Disp: 30 tablet, Rfl: 0    LORazepam (ATIVAN) 0.5 MG tablet, Take 1 tablet by mouth At Night As Needed for Anxiety (insomnia) for up to 3 days., Disp: 3 tablet, Rfl: 0    melatonin 5 MG tablet tablet, Take 2 tablets by mouth Every Night for 30 days., Disp: 60 tablet, Rfl: 0    nystatin (MYCOSTATIN) 100,000 unit/mL suspension, Swish and swallow 5 mL 4 (Four) Times a Day for 5 days., Disp: 60 mL, Rfl: 0    pantoprazole (PROTONIX) 40 MG EC tablet, Take 1 tablet by mouth Every Morning for 30 days., Disp: 30 tablet, Rfl: 0    Risankizumab-rzaa (SKYRIZI, 150 MG DOSE, SC), Inject  under the skin into the appropriate area as directed. Once every 3 months, Disp: , Rfl:     saccharomyces boulardii (FLORASTOR) 250 MG capsule, Take 1 capsule by mouth 2 (Two) Times a Day for 30 days., Disp: 60 capsule, Rfl: 0    sevelamer (RENVELA) 800 MG tablet, Take 1 tablet by mouth 2 (Two) Times a Day With Meals for 30 days., Disp: 60 tablet, Rfl: 0    torsemide (DEMADEX) 100 MG tablet, Take 1 tablet by mouth Daily for 30 days., Disp: 30 tablet, Rfl: 0     Objective   Physical Exam  Constitutional:       General: He is not in acute distress.     Appearance: Normal appearance. He is normal weight.   HENT:      Right Ear: Hearing normal.      Left Ear: Hearing  "normal.      Nose: No nasal tenderness or congestion.      Mouth/Throat:      Mouth: Mucous membranes are moist. No oral lesions.   Eyes:      Extraocular Movements: Extraocular movements intact.      Pupils: Pupils are equal, round, and reactive to light.   Neck:      Thyroid: No thyroid mass or thyromegaly.   Cardiovascular:      Rate and Rhythm: Normal rate and regular rhythm.      Pulses: Normal pulses.      Heart sounds: Normal heart sounds. No murmur heard.  Pulmonary:      Effort: Pulmonary effort is normal.      Breath sounds: Normal breath sounds. Examination of the right-upper field reveals rhonchi. Examination of the right-middle field reveals rhonchi. Examination of the right-lower field reveals rhonchi. No wheezing, rhonchi or rales.   Musculoskeletal:      Cervical back: Neck supple.      Right lower leg: No edema.      Left lower leg: No edema.   Lymphadenopathy:      Cervical: No cervical adenopathy.      Upper Body:      Right upper body: No axillary adenopathy.   Skin:     General: Skin is warm and dry.      Findings: No lesion or rash.   Neurological:      General: No focal deficit present.      Mental Status: He is alert and oriented to person, place, and time.   Psychiatric:         Mood and Affect: Affect normal. Mood is not anxious or depressed.         Vital Signs:   /54 (BP Location: Right arm, Patient Position: Sitting, Cuff Size: Adult)   Pulse 76   Temp 97.8 °F (36.6 °C) (Temporal)   Resp 16   Ht 167.6 cm (66\")   Wt 75.2 kg (165 lb 12.8 oz)   SpO2 92% Comment: RA  BMI 26.76 kg/m²        Result Review :   The following data was reviewed by: BECKY Sandoval on 03/12/2024:  CMP          3/7/2024    07:46 3/8/2024    04:29 3/9/2024    08:09   CMP   Glucose 205  252  157    BUN 29  29  34    Creatinine 6.28  6.06  6.52    EGFR 8.5  8.9  8.1    Sodium 143  141  141    Potassium 3.4  3.8  4.4    Chloride 103  101  100    Calcium 8.5  9.0  9.2    Total Protein 6.1  6.3  6.5  "   Albumin 2.6  2.8  2.6    Total Bilirubin 0.4  0.3  0.4    Alkaline Phosphatase 54  58  55    AST (SGOT) 23  26  32    ALT (SGPT) 13  16  19    BUN/Creatinine Ratio 4.6  4.8  5.2    Anion Gap 11.5  11.0  13.0      CBC w/diff          3/7/2024    07:46 3/8/2024    04:29 3/9/2024    08:09   CBC w/Diff   WBC 4.80  5.45  6.59    RBC 3.48  3.49  3.54    Hemoglobin 10.1  10.0  10.3    Hematocrit 34.3  34.8  34.8    MCV 98.6  99.7  98.3    MCH 29.0  28.7  29.1    MCHC 29.4  28.7  29.6    RDW 16.6  16.5  16.9    Platelets 184  193  209    Neutrophil Rel % 40.1  42.4  45.7    Immature Granulocyte Rel % 0.4  0.9  1.2    Lymphocyte Rel % 36.7  34.3  32.0    Monocyte Rel % 17.5  16.1  14.1    Eosinophil Rel % 4.0  4.6  5.2    Basophil Rel % 1.3  1.7  1.8    Personally reviewed respiratory culture, bronchoscopy wash, cytomegalovirus, pneumonia panel, BAL culture, AFB culture, virus culture, fungus culture and cytology from 3/1/2024    Data reviewed : Radiologic studies chest CT 2/28/2024, chest xray 3/8/2024, Cardiology studies echocardiogram 2/16/2024 showing grade 1 diastolic dysfunction along with aortic valve sclerosis with minimal aortic valve stenosis, Consultant notes Dr. Espinal consult note 2/29/2024, and Recent hospitalization notes Dr. Espinal bronchoscopy consult note 3/1/2024, Hospital discharge summary 3/9/2024    Procedures        Assessment and Plan    Diagnoses and all orders for this visit:    1. Multifocal pneumonia (Primary)  -     CT Chest Without Contrast; Future    2. Pleural effusion  -     CT Chest Without Contrast; Future    3. Acute respiratory failure with hypoxia    4. Nicotine dependence, cigarettes, in remission    5. Mucus plugging of bronchi    6. ESRD on peritoneal dialysis    7. Recurrent aspiration pneumonia    8. Grade I diastolic dysfunction    9.  Continue Brovana and Pulmicort.  10.  Repeat chest CT in 2 months to assess resolution of pneumonia.  11.  Continue follow up with nephrology for  management of ESRD on peritoneal dialysis  12.  Continue airway clearance at home  13.  Follow up with Dr. Dr. Espinal after CT scan (per patient request) sooner if needed    I spent 48 minutes caring for Nelson on this date of service. This time includes time spent by me in the following activities:preparing for the visit, reviewing tests, obtaining and/or reviewing a separately obtained history, performing a medically appropriate examination and/or evaluation , counseling and educating the patient/family/caregiver, ordering medications, tests, or procedures, and documenting information in the medical record    Follow Up   Return in about 2 months (around 5/12/2024) for Recheck with Dr. Espinal after CT.  Patient was given instructions and counseling regarding his condition or for health maintenance advice. Please see specific information pulled into the AVS if appropriate.

## 2024-03-13 ENCOUNTER — TELEPHONE (OUTPATIENT)
Dept: INTERNAL MEDICINE | Facility: CLINIC | Age: 79
End: 2024-03-13
Payer: MEDICARE

## 2024-03-13 ENCOUNTER — TRANSITIONAL CARE MANAGEMENT TELEPHONE ENCOUNTER (OUTPATIENT)
Dept: CALL CENTER | Facility: HOSPITAL | Age: 79
End: 2024-03-13
Payer: MEDICARE

## 2024-03-13 NOTE — OUTREACH NOTE
Call Center TCM Note      Flowsheet Row Responses   [facility] facility patient discharged from? [facility]   Does the patient have one of the following disease processes/diagnoses(primary or secondary)? Pneumonia   TCM attempt successful? No   Unsuccessful attempts Attempt 3  [No updated verbal release on file from PCP group]            Laura Marcelino RN    3/13/2024, 09:16 EDT

## 2024-03-15 LAB
FUNGUS WND CULT: NORMAL
MYCOBACTERIUM SPEC CULT: NORMAL
NIGHT BLUE STAIN TISS: NORMAL
NIGHT BLUE STAIN TISS: NORMAL

## 2024-03-18 ENCOUNTER — TELEPHONE (OUTPATIENT)
Dept: CARDIOLOGY | Facility: CLINIC | Age: 79
End: 2024-03-18

## 2024-03-18 ENCOUNTER — TELEPHONE (OUTPATIENT)
Dept: GASTROENTEROLOGY | Facility: CLINIC | Age: 79
End: 2024-03-18
Payer: MEDICARE

## 2024-03-18 NOTE — TELEPHONE ENCOUNTER
Daughter called and stated that [facility] did not have this prescription and they're needing it. I informed her that I would send a message letting you know.

## 2024-03-18 NOTE — TELEPHONE ENCOUNTER
Happy to send, but need to know which pharmacy has it are we able to find one that has it or can you call family and see where they would like us to try.

## 2024-03-18 NOTE — TELEPHONE ENCOUNTER
Hub staff attempted to follow warm transfer process and was unsuccessful     Caller: Marcus Wang    Relationship to patient: Emergency Contact    Best call back number: [phone]    Patient is needing: MARCUS CALLED IN TO SCHEDULE A COLONOSCOPY FOR HER FATHER KAYLA PLEASE CONTACT HER BACK AND YOU CAN LEAVE A VMAIL.

## 2024-03-18 NOTE — TELEPHONE ENCOUNTER
Caller: Jose Wang    Relationship to patient: Emergency Contact    Best call back number: [phone]     Chief complaint: TROPONIN LEVELS OVER 400    Type of visit: HOSPITAL FOLLOW UP     Requested date: ASAP      If rescheduling, when is the original appointment:      Additional notes:

## 2024-03-19 ENCOUNTER — OFFICE VISIT (OUTPATIENT)
Dept: CARDIOLOGY | Facility: CLINIC | Age: 79
End: 2024-03-19
Payer: MEDICARE

## 2024-03-19 VITALS
SYSTOLIC BLOOD PRESSURE: 179 MMHG | BODY MASS INDEX: 26.74 KG/M2 | HEART RATE: 76 BPM | DIASTOLIC BLOOD PRESSURE: 71 MMHG | WEIGHT: 166.4 LBS | HEIGHT: 66 IN

## 2024-03-19 DIAGNOSIS — I10 ESSENTIAL HYPERTENSION: ICD-10-CM

## 2024-03-19 DIAGNOSIS — I21.4 NSTEMI (NON-ST ELEVATED MYOCARDIAL INFARCTION): ICD-10-CM

## 2024-03-19 DIAGNOSIS — R06.02 SHORTNESS OF BREATH: Primary | ICD-10-CM

## 2024-03-19 DIAGNOSIS — E78.2 MIXED DYSLIPIDEMIA: ICD-10-CM

## 2024-03-19 NOTE — PROGRESS NOTES
Chief Complaint  Hypertension, Hyperlipidemia, and Hospital Follow Up Visit (Hospital F/U. )    Subjective        History of Present Illness  Nelson Wang presents to [facility] CARDIOLOGY for follow up.  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  He had no chest pain during his hospitalization and denies any chest pain recently.  He does have shortness of breath with activity.  He denies any palpitations, dizziness, lightheadedness or syncope      Past Medical History:   Diagnosis Date    Anemia     Ankle pain, right     CKD (chronic kidney disease), stage IV     Diabetes     Gout     High cholesterol     HL (hearing loss)     Hyperkalemia     Hypertension     Hypothyroidism     Psoriasis     Vitiligo        ALLERGY  No Known Allergies     Past Surgical History:   Procedure Laterality Date    ANKLE SURGERY  11/1990    APPENDECTOMY N/A 1954    BRONCHOSCOPY N/A 3/1/2024    Procedure: BRONCHOSCOPY WITH BAL AND WASHINGS;  Surgeon: Sandra Espinal MD;  Location: [facility] ENDOSCOPY;  Service: Pulmonary;  Laterality: N/A;  PNEUMONIA    COLONOSCOPY N/A 06/2022    [facility]    HIP BIPOLAR REPLACEMENT Right 01/2000    INSERTION PERITONEAL DIALYSIS CATHETER N/A 3/27/2023    Procedure: LAPAROSCOPIC INSERTION PERITONEAL DIALYSIS CATHETER, LAPAROSCOPIC OMENTOPEXY WITH LYSIS OF ADHESIONS;  Surgeon: Jose Berry MD;  Location: [facility] OR;  Service: General;  Laterality: N/A;    INSERTION PERITONEAL DIALYSIS CATHETER Left 7/23/2023    Procedure: REVISION OF PERITONEAL DIALYSIS CATHETER;  Surgeon: Radha Oreilly MD;  Location: [facility] OR;  Service: General;  Laterality:  Left;    RENAL BIOPSY Left 07/15/2022        Social History     Socioeconomic History    Marital status:    Tobacco Use    Smoking status: Former     Current packs/day: 0.00     Average packs/day: 1 pack/day for 10.0 years (10.0 ttl pk-yrs)     Types: Cigarettes     Start date:      Quit date:      Years since quittin.2     Passive exposure: Past    Smokeless tobacco: Never    Tobacco comments:     quit 25 years ago, chews nicotine gum   Vaping Use    Vaping status: Never Used   Substance and Sexual Activity    Alcohol use: Yes     Comment: 1 DRINK/DAY    Drug use: Never    Sexual activity: Defer       Family History   Problem Relation Age of Onset    Diabetes Mother     Heart disease Father     Cancer Sister 35    Diabetes Sister     Diabetes Brother     Heart disease Paternal Uncle     Malig Hyperthermia Neg Hx         Current Outpatient Medications on File Prior to Visit   Medication Sig    allopurinol (ZYLOPRIM) 100 MG tablet Take 1 tablet by mouth Daily for 30 days.    arformoterol (BROVANA) 15 MCG/2ML nebulizer solution Take 2 mL by nebulization 2 (Two) Times a Day for 30 days. Indications: Chronic Obstructive Lung Disease, J 44.9    ARIPiprazole (ABILIFY) 2 MG tablet Take 1 tablet by mouth Every Night for 30 days.    aspirin 81 MG EC tablet Take 1 tablet by mouth Daily for 30 days.    atorvastatin (LIPITOR) 40 MG tablet Take 1 tablet by mouth Daily for 30 days.    benzonatate (TESSALON) 100 MG capsule Take 1 capsule by mouth 3 (Three) Times a Day As Needed for Cough.    budesonide (PULMICORT) 0.5 MG/2ML nebulizer solution Take 2 mL by nebulization 2 (Two) Times a Day for 30 days.    carvedilol (COREG) 6.25 MG tablet Take 1 tablet by mouth 2 (Two) Times a Day for 30 days.    famotidine (PEPCID) 20 MG tablet Take 1 tablet by mouth Every Night for 30 days.    gentamicin (GARAMYCIN) 0.1 % cream Apply 1 Application topically to the appropriate area as directed Daily.    Insulin Glargine (LANTUS  "SOLOSTAR) 100 UNIT/ML injection pen Inject 5 Units under the skin into the appropriate area as directed Every Night.    ipratropium-albuterol (DUO-NEB) 0.5-2.5 mg/3 ml nebulizer Take 3 mL by nebulization Every 4 (Four) Hours As Needed for Wheezing or Shortness of Air. Indications: Chronic Obstructive Lung Disease, J44.9    levothyroxine (SYNTHROID, LEVOTHROID) 150 MCG tablet Take 1 tablet by mouth Every Morning for 30 days.    melatonin 5 MG tablet tablet Take 2 tablets by mouth Every Night for 30 days.    pantoprazole (PROTONIX) 40 MG EC tablet Take 1 tablet by mouth Every Morning for 30 days.    Risankizumab-rzaa (SKYRIZI, 150 MG DOSE, SC) Inject  under the skin into the appropriate area as directed. Once every 3 months    saccharomyces boulardii (FLORASTOR) 250 MG capsule Take 1 capsule by mouth 2 (Two) Times a Day for 30 days.    sevelamer (RENVELA) 800 MG tablet Take 1 tablet by mouth 2 (Two) Times a Day With Meals for 30 days.    torsemide (DEMADEX) 100 MG tablet Take 1 tablet by mouth Daily for 30 days.     No current facility-administered medications on file prior to visit.       Objective   Vitals:    03/19/24 1023   BP: 179/71   Pulse: 76   Weight: 75.5 kg (166 lb 6.4 oz)   Height: 167.6 cm (66\")       Physical Exam  Constitutional:       General: He is awake. He is not in acute distress.     Appearance: Normal appearance.   HENT:      Head: Normocephalic.      Nose: Nose normal. No congestion.   Eyes:      Extraocular Movements: Extraocular movements intact.      Conjunctiva/sclera: Conjunctivae normal.      Pupils: Pupils are equal, round, and reactive to light.   Neck:      Thyroid: No thyromegaly.      Vascular: No JVD.   Cardiovascular:      Rate and Rhythm: Normal rate and regular rhythm.      Chest Wall: PMI is not displaced.      Pulses: Normal pulses.      Heart sounds: Normal heart sounds, S1 normal and S2 normal. No murmur heard.     No friction rub. No gallop. No S3 or S4 sounds.   Pulmonary: "      Effort: Pulmonary effort is normal.      Breath sounds: Normal breath sounds. No wheezing, rhonchi or rales.   Abdominal:      General: Bowel sounds are normal.      Palpations: Abdomen is soft.      Tenderness: There is no abdominal tenderness.   Musculoskeletal:      Cervical back: No tenderness.      Right lower leg: No edema.      Left lower leg: No edema.   Lymphadenopathy:      Cervical: No cervical adenopathy.   Skin:     General: Skin is warm and dry.      Capillary Refill: Capillary refill takes less than 2 seconds.      Coloration: Skin is not cyanotic.      Findings: No petechiae or rash.      Nails: There is no clubbing.   Neurological:      Mental Status: He is alert.   Psychiatric:         Mood and Affect: Mood normal.         Behavior: Behavior is cooperative.           Result Review     The following data was reviewed by BECKY Guan on 03/19/24.      CMP          3/7/2024    07:46 3/8/2024    04:29 3/9/2024    08:09   CMP   Glucose 205  252  157    BUN 29  29  34    Creatinine 6.28  6.06  6.52    EGFR 8.5  8.9  8.1    Sodium 143  141  141    Potassium 3.4  3.8  4.4    Chloride 103  101  100    Calcium 8.5  9.0  9.2    Total Protein 6.1  6.3  6.5    Albumin 2.6  2.8  2.6    Total Bilirubin 0.4  0.3  0.4    Alkaline Phosphatase 54  58  55    AST (SGOT) 23  26  32    ALT (SGPT) 13  16  19    BUN/Creatinine Ratio 4.6  4.8  5.2    Anion Gap 11.5  11.0  13.0      CBC w/diff          3/7/2024    07:46 3/8/2024    04:29 3/9/2024    08:09   CBC w/Diff   WBC 4.80  5.45  6.59    RBC 3.48  3.49  3.54    Hemoglobin 10.1  10.0  10.3    Hematocrit 34.3  34.8  34.8    MCV 98.6  99.7  98.3    MCH 29.0  28.7  29.1    MCHC 29.4  28.7  29.6    RDW 16.6  16.5  16.9    Platelets 184  193  209    Neutrophil Rel % 40.1  42.4  45.7    Immature Granulocyte Rel % 0.4  0.9  1.2    Lymphocyte Rel % 36.7  34.3  32.0    Monocyte Rel % 17.5  16.1  14.1    Eosinophil Rel % 4.0  4.6  5.2    Basophil Rel % 1.3  1.7   1.8       Lipid Panel          12/6/2023    09:51   Lipid Panel   Total Cholesterol 113    Triglycerides 64    HDL Cholesterol 49    VLDL Cholesterol 14    LDL Cholesterol  50    LDL/HDL Ratio 1.04        Results for orders placed during the hospital encounter of 02/16/24    Adult Transthoracic Echo Complete W/ Cont if Necessary Per Protocol    Interpretation Summary    Left ventricular systolic function is normal. Calculated left ventricular EF = 56.6%    Left ventricular wall thickness is consistent with mild concentric hypertrophy.    Left ventricular diastolic function is consistent with (grade I) impaired relaxation and age.    Aortic valve sclerosis with minimal aortic valve stenosis is present.                Procedures    Assessment & Plan  Diagnoses and all orders for this visit:    1. Shortness of breath (Primary)  -     Stress Test With Myocardial Perfusion One Day; Future    2. Essential hypertension    3. Mixed dyslipidemia    4. NSTEMI (non-ST elevated myocardial infarction)        1.  Pharmacological stress testing will be done for evaluation of myocardial ischemia.  Patient is status post recent hospitalization where he had troponin rise from 1 96-4 82 thought to be related to demand ischemia.  Further recommendations pending test results.  2.  Elevated in the office today but patient reports he has been rushing.  He follows his blood pressure closely at home and notes that it normally runs less than 140 systolic.  Continue current antihypertensive regimen.  3.  Continue statin therapy.  4.  Stress testing to be done for further evaluation.        The medical services provided during this encounter are part of ongoing care related to this patient's single serious condition or complex condition.      Follow Up   Return in about 8 weeks (around 5/14/2024) for With BECKY Torres.    Patient was given instructions and counseling regarding his condition or for health maintenance advice. Please  see specific information pulled into the AVS if appropriate.     Aylin Nicole, APRN  03/19/24  10:46 EDT    Dictated Utilizing Dragon Dictation

## 2024-03-22 ENCOUNTER — OFFICE VISIT (OUTPATIENT)
Dept: INTERNAL MEDICINE | Facility: CLINIC | Age: 79
End: 2024-03-22
Payer: MEDICARE

## 2024-03-22 VITALS
SYSTOLIC BLOOD PRESSURE: 112 MMHG | DIASTOLIC BLOOD PRESSURE: 58 MMHG | HEIGHT: 66 IN | RESPIRATION RATE: 16 BRPM | TEMPERATURE: 101.2 F | HEART RATE: 73 BPM | WEIGHT: 167.38 LBS | OXYGEN SATURATION: 98 % | BODY MASS INDEX: 26.9 KG/M2

## 2024-03-22 DIAGNOSIS — G89.29 CHRONIC BILATERAL LOW BACK PAIN WITHOUT SCIATICA: Primary | ICD-10-CM

## 2024-03-22 DIAGNOSIS — M54.50 CHRONIC BILATERAL LOW BACK PAIN WITHOUT SCIATICA: Primary | ICD-10-CM

## 2024-03-22 DIAGNOSIS — R05.1 ACUTE COUGH: ICD-10-CM

## 2024-03-22 DIAGNOSIS — E11.9 TYPE 2 DIABETES MELLITUS WITHOUT COMPLICATION, WITHOUT LONG-TERM CURRENT USE OF INSULIN: ICD-10-CM

## 2024-03-22 DIAGNOSIS — E03.9 ACQUIRED HYPOTHYROIDISM: ICD-10-CM

## 2024-03-22 DIAGNOSIS — N18.5 CHRONIC KIDNEY DISEASE, STAGE 5: ICD-10-CM

## 2024-03-22 DIAGNOSIS — I10 ESSENTIAL HYPERTENSION: ICD-10-CM

## 2024-03-22 DIAGNOSIS — R50.9 FEVER, UNSPECIFIED FEVER CAUSE: ICD-10-CM

## 2024-03-22 LAB
25(OH)D3 SERPL-MCNC: 33.8 NG/ML (ref 30–100)
ALBUMIN SERPL-MCNC: 3.3 G/DL (ref 3.5–5.2)
ALBUMIN/GLOB SERPL: 1.2 G/DL
ALP SERPL-CCNC: 76 U/L (ref 39–117)
ALT SERPL W P-5'-P-CCNC: 22 U/L (ref 1–41)
ANION GAP SERPL CALCULATED.3IONS-SCNC: 11.2 MMOL/L (ref 5–15)
AST SERPL-CCNC: 31 U/L (ref 1–40)
BASOPHILS # BLD AUTO: 0.04 10*3/MM3 (ref 0–0.2)
BASOPHILS NFR BLD AUTO: 0.4 % (ref 0–1.5)
BILIRUB SERPL-MCNC: 0.5 MG/DL (ref 0–1.2)
BUN SERPL-MCNC: 72 MG/DL (ref 8–23)
BUN/CREAT SERPL: 12.6 (ref 7–25)
CALCIUM SPEC-SCNC: 8.5 MG/DL (ref 8.6–10.5)
CHLORIDE SERPL-SCNC: 102 MMOL/L (ref 98–107)
CHOLEST SERPL-MCNC: 147 MG/DL (ref 0–200)
CO2 SERPL-SCNC: 25.8 MMOL/L (ref 22–29)
CREAT SERPL-MCNC: 5.72 MG/DL (ref 0.76–1.27)
DEPRECATED RDW RBC AUTO: 48.7 FL (ref 37–54)
EGFRCR SERPLBLD CKD-EPI 2021: 9.5 ML/MIN/1.73
EOSINOPHIL # BLD AUTO: 0.04 10*3/MM3 (ref 0–0.4)
EOSINOPHIL NFR BLD AUTO: 0.4 % (ref 0.3–6.2)
ERYTHROCYTE [DISTWIDTH] IN BLOOD BY AUTOMATED COUNT: 14.3 % (ref 12.3–15.4)
FOLATE SERPL-MCNC: 5.68 NG/ML (ref 4.78–24.2)
FUNGUS WND CULT: NORMAL
GLOBULIN UR ELPH-MCNC: 2.8 GM/DL
GLUCOSE SERPL-MCNC: 77 MG/DL (ref 65–99)
HBA1C MFR BLD: 6.7 % (ref 4.8–5.6)
HCT VFR BLD AUTO: 28.5 % (ref 37.5–51)
HDLC SERPL-MCNC: 80 MG/DL (ref 40–60)
HGB BLD-MCNC: 8.9 G/DL (ref 13–17.7)
IMM GRANULOCYTES # BLD AUTO: 0.05 10*3/MM3 (ref 0–0.05)
IMM GRANULOCYTES NFR BLD AUTO: 0.6 % (ref 0–0.5)
IRON 24H UR-MRATE: 260 MCG/DL (ref 59–158)
IRON SATN MFR SERPL: 90 % (ref 20–50)
LDLC SERPL CALC-MCNC: 47 MG/DL (ref 0–100)
LDLC/HDLC SERPL: 0.54 {RATIO}
LYMPHOCYTES # BLD AUTO: 2.53 10*3/MM3 (ref 0.7–3.1)
LYMPHOCYTES NFR BLD AUTO: 28 % (ref 19.6–45.3)
MAGNESIUM SERPL-MCNC: 1.6 MG/DL (ref 1.6–2.4)
MCH RBC QN AUTO: 29.3 PG (ref 26.6–33)
MCHC RBC AUTO-ENTMCNC: 31.2 G/DL (ref 31.5–35.7)
MCV RBC AUTO: 93.8 FL (ref 79–97)
MONOCYTES # BLD AUTO: 0.86 10*3/MM3 (ref 0.1–0.9)
MONOCYTES NFR BLD AUTO: 9.5 % (ref 5–12)
MYCOBACTERIUM SPEC CULT: NORMAL
NEUTROPHILS NFR BLD AUTO: 5.5 10*3/MM3 (ref 1.7–7)
NEUTROPHILS NFR BLD AUTO: 61.1 % (ref 42.7–76)
NIGHT BLUE STAIN TISS: NORMAL
NIGHT BLUE STAIN TISS: NORMAL
NRBC BLD AUTO-RTO: 0 /100 WBC (ref 0–0.2)
PLATELET # BLD AUTO: 159 10*3/MM3 (ref 140–450)
PMV BLD AUTO: 10.9 FL (ref 6–12)
POTASSIUM SERPL-SCNC: 4.6 MMOL/L (ref 3.5–5.2)
PROT SERPL-MCNC: 6.1 G/DL (ref 6–8.5)
RBC # BLD AUTO: 3.04 10*6/MM3 (ref 4.14–5.8)
SODIUM SERPL-SCNC: 139 MMOL/L (ref 136–145)
TIBC SERPL-MCNC: 288 MCG/DL (ref 298–536)
TRANSFERRIN SERPL-MCNC: 193 MG/DL (ref 200–360)
TRIGL SERPL-MCNC: 118 MG/DL (ref 0–150)
TSH SERPL DL<=0.05 MIU/L-ACNC: 13.9 UIU/ML (ref 0.27–4.2)
VIT B12 BLD-MCNC: 801 PG/ML (ref 211–946)
VLDLC SERPL-MCNC: 20 MG/DL (ref 5–40)
WBC NRBC COR # BLD AUTO: 9.02 10*3/MM3 (ref 3.4–10.8)

## 2024-03-22 PROCEDURE — 82607 VITAMIN B-12: CPT | Performed by: INTERNAL MEDICINE

## 2024-03-22 PROCEDURE — 82306 VITAMIN D 25 HYDROXY: CPT | Performed by: INTERNAL MEDICINE

## 2024-03-22 PROCEDURE — 83036 HEMOGLOBIN GLYCOSYLATED A1C: CPT | Performed by: INTERNAL MEDICINE

## 2024-03-22 PROCEDURE — 80061 LIPID PANEL: CPT | Performed by: INTERNAL MEDICINE

## 2024-03-22 PROCEDURE — 83735 ASSAY OF MAGNESIUM: CPT | Performed by: INTERNAL MEDICINE

## 2024-03-22 PROCEDURE — 83540 ASSAY OF IRON: CPT | Performed by: INTERNAL MEDICINE

## 2024-03-22 PROCEDURE — 85025 COMPLETE CBC W/AUTO DIFF WBC: CPT | Performed by: INTERNAL MEDICINE

## 2024-03-22 PROCEDURE — 82746 ASSAY OF FOLIC ACID SERUM: CPT | Performed by: INTERNAL MEDICINE

## 2024-03-22 PROCEDURE — 80053 COMPREHEN METABOLIC PANEL: CPT | Performed by: INTERNAL MEDICINE

## 2024-03-22 PROCEDURE — 84466 ASSAY OF TRANSFERRIN: CPT | Performed by: INTERNAL MEDICINE

## 2024-03-22 PROCEDURE — 84443 ASSAY THYROID STIM HORMONE: CPT | Performed by: INTERNAL MEDICINE

## 2024-03-22 RX ORDER — BUDESONIDE 0.5 MG/2ML
0.5 INHALANT ORAL
Qty: 120 ML | Refills: 0 | Status: SHIPPED | OUTPATIENT
Start: 2024-03-22 | End: 2024-03-28

## 2024-03-22 RX ORDER — TORSEMIDE 100 MG/1
100 TABLET ORAL DAILY
Qty: 30 TABLET | Refills: 0 | Status: SHIPPED | OUTPATIENT
Start: 2024-03-22 | End: 2024-04-21

## 2024-03-22 RX ORDER — CYCLOBENZAPRINE HCL 5 MG
5 TABLET ORAL 3 TIMES DAILY PRN
Qty: 90 TABLET | Refills: 1 | Status: SHIPPED | OUTPATIENT
Start: 2024-03-22

## 2024-03-22 RX ORDER — INSULIN GLARGINE 100 [IU]/ML
INJECTION, SOLUTION SUBCUTANEOUS
Qty: 6 ML | Refills: 0 | OUTPATIENT
Start: 2024-03-22

## 2024-03-22 RX ORDER — PREDNISONE 20 MG/1
40 TABLET ORAL DAILY
Qty: 10 TABLET | Refills: 0 | Status: SHIPPED | OUTPATIENT
Start: 2024-03-22 | End: 2024-03-27

## 2024-03-22 RX ORDER — PANTOPRAZOLE SODIUM 40 MG/1
40 TABLET, DELAYED RELEASE ORAL
Qty: 30 TABLET | Refills: 0 | Status: SHIPPED | OUTPATIENT
Start: 2024-03-22 | End: 2024-04-21

## 2024-03-22 RX ORDER — LEVOTHYROXINE SODIUM 0.15 MG/1
150 TABLET ORAL
Qty: 30 TABLET | Refills: 0 | Status: SHIPPED | OUTPATIENT
Start: 2024-03-22 | End: 2024-04-21

## 2024-03-22 RX ORDER — ATORVASTATIN CALCIUM 40 MG/1
40 TABLET, FILM COATED ORAL DAILY
Qty: 30 TABLET | Refills: 0 | Status: SHIPPED | OUTPATIENT
Start: 2024-03-22 | End: 2024-04-21

## 2024-03-22 RX ORDER — IPRATROPIUM BROMIDE AND ALBUTEROL SULFATE 2.5; .5 MG/3ML; MG/3ML
3 SOLUTION RESPIRATORY (INHALATION) EVERY 4 HOURS PRN
Qty: 360 ML | Refills: 0 | Status: SHIPPED | OUTPATIENT
Start: 2024-03-22

## 2024-03-22 RX ORDER — SEVELAMER CARBONATE 800 MG/1
800 TABLET, FILM COATED ORAL 2 TIMES DAILY WITH MEALS
Qty: 60 TABLET | Refills: 0 | Status: SHIPPED | OUTPATIENT
Start: 2024-03-22 | End: 2024-04-21

## 2024-03-22 RX ORDER — ARIPIPRAZOLE 2 MG/1
2 TABLET ORAL NIGHTLY
Qty: 30 TABLET | Refills: 0 | Status: SHIPPED | OUTPATIENT
Start: 2024-03-22 | End: 2024-03-28 | Stop reason: SDUPTHER

## 2024-03-22 RX ORDER — CARVEDILOL 6.25 MG/1
6.25 TABLET ORAL 2 TIMES DAILY
Qty: 60 TABLET | Refills: 0 | Status: SHIPPED | OUTPATIENT
Start: 2024-03-22 | End: 2024-04-21

## 2024-03-22 RX ORDER — ARFORMOTEROL TARTRATE 15 UG/2ML
15 SOLUTION RESPIRATORY (INHALATION)
Qty: 120 ML | Refills: 0 | Status: SHIPPED | OUTPATIENT
Start: 2024-03-22 | End: 2024-04-21

## 2024-03-22 NOTE — PROGRESS NOTES
Chief Complaint  Hospital Follow Up Visit (Hospital follow up from 2-28-24, has gout in right ankle, muscle spasms back left lateral, needs new prescription for prednisone )      Subjective      Transitional Care Follow Up Visit  Subjective         Nelson Wang is a 79 y.o. male who presents for a transitional care management visit.    Within 48 business hours after discharge our office contacted him via telephone to coordinate his care and needs.      I reviewed and discussed the details of that call along with the discharge summary, hospital problems, inpatient lab results, inpatient diagnostic studies, and consultation reports with Nelson.     Current outpatient and discharge medications have been reconciled for the patient.  Reviewed by: Janna Mo MD          3/9/2024    12:18 PM   Date of TCM Phone Call   [facility]   Date of Admission 2/28/2024   Date of Discharge 3/9/2024   Discharge Disposition Home-Health Care [facility]     Risk for Readmission (LACE) Score: 12 (3/9/2024  6:00 AM)      History of Present Illness  The patient presents for evaluation of multiple medical concerns. He is accompanied by his son in law.    About 4 months ago, he had a fall in the garden. He sought care and had an x-ray done. They thought there was some inflammation in the right lower lung and gave him some medication. A few weeks later, he started feeling weak. He could not walk much and was feeling short of breath. His daughter took him to the emergency room. They diagnosed him with pneumonia. The next day, they did another CT of the chest. He was admitted to the ICU. Critical care doc did a bronchoscopy and removed a plug. A couple of days later, he started experiencing psychotic symptoms. He was hallucinating, paranoid, and irritated. He does not remember all these things. He went to bed for a couple of nights and was agitated. He had a psych consult and was given Abilify 2 mg and Ativan 0.25 mg. He  "slept well that night. Dr. Castro was seeing him and helping adjust his medications. That night, it produced extra fluid. He started clearing up mentally and physically. In the morning, he was more familiar with the place and orientation. As the day went by, he became sharper and clear. He was treated and discharged. He has gotten stronger. He is doing PT and OT. He is doing PT and OT at home. He denies any hallucinations. He wakes up in the morning and does not know where he is. It goes away quickly. He feels 90 percent better from a mental health standpoint. He stayed on Abilify and feels well on it.    He is on peritoneal dialysis. He denies any complications. He denies any infection, pain, or rigidity. He feels comfortable doing it himself at home. He is making urine. If he drinks a couple of beers, he goes to the bathroom. He is on prednisone for gout. When his foot is painful and swollen, he takes 3 to 4 doses of prednisone.    This morning, he had a muscle spasm. His daughter gave him some Flexeril. He put some heat this morning. He still has some pain. The pain does not radiate into his legs.    He has mild cough. When he left the hospital, he was coughing, but not bringing up anything. He denies any fevers. He has intermittent fever. Today, his temperature was 101.     He is a diabetic. He takes 15 to 20 units of insulin depending on what he eats. His A1c is usually around 6 or 7. His high blood pressure is controlled by carvedilol.          Objective   Vital Signs:   Vitals:    03/22/24 0846   BP: 112/58   BP Location: Left arm   Patient Position: Sitting   Cuff Size: Adult   Pulse: 73   Resp: 16   Temp: (!) 101.2 °F (38.4 °C)   TempSrc: Temporal   SpO2: 98%   Weight: 75.9 kg (167 lb 6 oz)   Height: 167.6 cm (66\")     Body mass index is 27.02 kg/m².    Wt Readings from Last 3 Encounters:   03/22/24 75.9 kg (167 lb 6 oz)   03/19/24 75.5 kg (166 lb 6.4 oz)   03/12/24 75.2 kg (165 lb 12.8 oz)     BP Readings " from Last 3 Encounters:   03/22/24 112/58   03/19/24 179/71   03/12/24 115/54       Health Maintenance   Topic Date Due    TDAP/TD VACCINES (1 - Tdap) Never done    ZOSTER VACCINE (1 of 2) Never done    DIABETIC EYE EXAM  11/04/2021    URINE MICROALBUMIN  03/07/2024    HEMOGLOBIN A1C  06/06/2024    BMI FOLLOWUP  09/19/2024    ANNUAL WELLNESS VISIT  12/06/2024    LIPID PANEL  12/06/2024    HEPATITIS C SCREENING  Completed    Hepatitis B  Completed    COVID-19 Vaccine  Completed    RSV Vaccine - Adults  Completed    INFLUENZA VACCINE  Completed    Pneumococcal Vaccine 65+  Completed    COLORECTAL CANCER SCREENING  Discontinued       Physical Exam  Vitals reviewed.   Constitutional:       Appearance: Normal appearance. He is well-developed.   HENT:      Head: Normocephalic and atraumatic.      Right Ear: External ear normal.      Left Ear: External ear normal.   Eyes:      Conjunctiva/sclera: Conjunctivae normal.      Pupils: Pupils are equal, round, and reactive to light.   Cardiovascular:      Rate and Rhythm: Normal rate and regular rhythm.      Heart sounds: No murmur heard.     No friction rub. No gallop.   Pulmonary:      Effort: Pulmonary effort is normal.      Breath sounds: Normal breath sounds. No wheezing or rhonchi.   Skin:     General: Skin is warm and dry.   Neurological:      Mental Status: He is alert and oriented to person, place, and time.   Psychiatric:         Mood and Affect: Affect normal.         Behavior: Behavior normal.         Thought Content: Thought content normal.        Physical Exam  Slight crackling.    Vital Signs  Temperature is 101.      Result Review :  The following data was reviewed by: Janna Mo MD on 03/22/2024:         Results              Procedures            Assessment & Plan  Chronic bilateral low back pain without sciatica    Acquired hypothyroidism    Essential hypertension    Type 2 diabetes mellitus without complication, without long-term current use of  insulin    Chronic kidney disease, stage 5    Fever, unspecified fever cause    Acute cough      Orders Placed This Encounter   Procedures    XR Chest PA & Lateral    Comprehensive Metabolic Panel    Hemoglobin A1c    Lipid Panel    TSH    Magnesium    Iron Profile    Vitamin D,25-Hydroxy    Vitamin B12 & Folate    CBC Auto Differential    CBC & Differential     New Medications Ordered This Visit   Medications    predniSONE (DELTASONE) 20 MG tablet     Sig: Take 2 tablets by mouth Daily for 5 days.     Dispense:  10 tablet     Refill:  0    Insulin Glargine (LANTUS SOLOSTAR) 100 UNIT/ML injection pen     Sig: Inject 20 Units under the skin into the appropriate area as directed Every Night.     Dispense:  12 mL     Refill:  2    cyclobenzaprine (FLEXERIL) 5 MG tablet     Sig: Take 1 tablet by mouth 3 (Three) Times a Day As Needed for Muscle Spasms.     Dispense:  90 tablet     Refill:  1    arformoterol (BROVANA) 15 MCG/2ML nebulizer solution     Sig: Take 2 mL by nebulization 2 (Two) Times a Day for 30 days. Indications: Chronic Obstructive Lung Disease, J 44.9     Dispense:  120 mL     Refill:  0    ARIPiprazole (ABILIFY) 2 MG tablet     Sig: Take 1 tablet by mouth Every Night for 30 days.     Dispense:  30 tablet     Refill:  0    atorvastatin (LIPITOR) 40 MG tablet     Sig: Take 1 tablet by mouth Daily for 30 days.     Dispense:  30 tablet     Refill:  0    budesonide (PULMICORT) 0.5 MG/2ML nebulizer solution     Sig: Take 2 mL by nebulization 2 (Two) Times a Day for 30 days.     Dispense:  120 mL     Refill:  0    carvedilol (COREG) 6.25 MG tablet     Sig: Take 1 tablet by mouth 2 (Two) Times a Day for 30 days.     Dispense:  60 tablet     Refill:  0    ipratropium-albuterol (DUO-NEB) 0.5-2.5 mg/3 ml nebulizer     Sig: Take 3 mL by nebulization Every 4 (Four) Hours As Needed for Wheezing or Shortness of Air. Indications: Chronic Obstructive Lung Disease, J44.9     Dispense:  360 mL     Refill:  0     levothyroxine (SYNTHROID, LEVOTHROID) 150 MCG tablet     Sig: Take 1 tablet by mouth Every Morning for 30 days.     Dispense:  30 tablet     Refill:  0    pantoprazole (PROTONIX) 40 MG EC tablet     Sig: Take 1 tablet by mouth Every Morning for 30 days.     Dispense:  30 tablet     Refill:  0    sevelamer (RENVELA) 800 MG tablet     Sig: Take 1 tablet by mouth 2 (Two) Times a Day With Meals for 30 days.     Dispense:  60 tablet     Refill:  0    torsemide (DEMADEX) 100 MG tablet     Sig: Take 1 tablet by mouth Daily for 30 days.     Dispense:  30 tablet     Refill:  0          Assessment & Plan  1. Psychotic symptoms.  He is doing well. Abilify can cause weight gain which isn't worrisome at this point. He will continue Abilify 2 mg and Ativan 0.25 mg.    2. Diabetes.  He will continue to monitor his blood glucose levels. I will refill his Lantus.    3. Gout.  I will send in a prescription for prednisone 40 mg daily for 5 days. He will watch his blood glucose levels while taking prednisone.    4. Muscle spasm.  He will continue Flexeril. He was advised to watch his mental health status while taking Flexeril. I will send in a prescription for Flexeril twice a day.    5. Kidney failure  He is on dialysis.     6. Fever/pneumonia follow up  Does have a fever today  Will get CXR    I will check magnesium, iron, B12, and folate.    if the chest x-ray is worrisome, we may bump up the chest CT sooner.    Follow-up  The patient will follow up in 1 month.    Patient or patient representative verbalized consent for the use of Ambient Listening during the visit with  Janna Mo MD for chart documentation. 3/22/2024  09:54 EDT      FOLLOW UP  Return in about 1 month (around 4/22/2024).  Patient was given instructions and counseling regarding his condition or for health maintenance advice. Please see specific information pulled into the AVS if appropriate.     Janna Mo MD  03/22/24  15:56 EDT    CURRENT &  DISCONTINUED MEDICATIONS  Current Outpatient Medications   Medication Instructions    arformoterol (BROVANA) 15 mcg, Nebulization, 2 Times Daily - RT    ARIPiprazole (ABILIFY) 2 mg, Oral, Nightly    atorvastatin (LIPITOR) 40 mg, Oral, Daily    budesonide (PULMICORT) 0.5 mg, Nebulization, 2 Times Daily - RT    carvedilol (COREG) 6.25 mg, Oral, 2 Times Daily    cyclobenzaprine (FLEXERIL) 5 mg, Oral, 3 Times Daily PRN    gentamicin (GARAMYCIN) 0.1 % cream 1 Application, Topical, Daily    Insulin Glargine (LANTUS SOLOSTAR) 20 Units, Subcutaneous, Nightly    ipratropium-albuterol (DUO-NEB) 0.5-2.5 mg/3 ml nebulizer 3 mL, Nebulization, Every 4 Hours PRN    levothyroxine (SYNTHROID, LEVOTHROID) 150 mcg, Oral, Every Early Morning    pantoprazole (PROTONIX) 40 mg, Oral, Every Early Morning    predniSONE (DELTASONE) 40 mg, Oral, Daily    Risankizumab-rzaa (SKYRIZI, 150 MG DOSE, SC) Subcutaneous, Once every 3 months     saccharomyces boulardii (FLORASTOR) 250 mg, Oral, 2 Times Daily    sevelamer (RENVELA) 800 mg, Oral, 2 Times Daily With Meals    torsemide (DEMADEX) 100 mg, Oral, Daily       Medications Discontinued During This Encounter   Medication Reason    Insulin Glargine (LANTUS SOLOSTAR) 100 UNIT/ML injection pen Reorder    melatonin 5 MG tablet tablet     allopurinol (ZYLOPRIM) 100 MG tablet     aspirin 81 MG EC tablet     famotidine (PEPCID) 20 MG tablet     benzonatate (TESSALON) 100 MG capsule     carvedilol (COREG) 6.25 MG tablet Reorder    atorvastatin (LIPITOR) 40 MG tablet Reorder    levothyroxine (SYNTHROID, LEVOTHROID) 150 MCG tablet Reorder    sevelamer (RENVELA) 800 MG tablet Reorder    torsemide (DEMADEX) 100 MG tablet Reorder    ARIPiprazole (ABILIFY) 2 MG tablet Reorder    budesonide (PULMICORT) 0.5 MG/2ML nebulizer solution Reorder    pantoprazole (PROTONIX) 40 MG EC tablet Reorder    arformoterol (BROVANA) 15 MCG/2ML nebulizer solution Reorder    ipratropium-albuterol (DUO-NEB) 0.5-2.5 mg/3 ml nebulizer  Reorder

## 2024-03-24 ENCOUNTER — APPOINTMENT (OUTPATIENT)
Dept: GENERAL RADIOLOGY | Facility: HOSPITAL | Age: 79
End: 2024-03-24
Payer: MEDICARE

## 2024-03-24 ENCOUNTER — APPOINTMENT (OUTPATIENT)
Facility: HOSPITAL | Age: 79
End: 2024-03-24
Payer: MEDICARE

## 2024-03-24 ENCOUNTER — APPOINTMENT (OUTPATIENT)
Dept: CT IMAGING | Facility: HOSPITAL | Age: 79
End: 2024-03-24
Payer: MEDICARE

## 2024-03-24 ENCOUNTER — HOSPITAL ENCOUNTER (EMERGENCY)
Facility: HOSPITAL | Age: 79
Discharge: HOME | End: 2024-03-24
Attending: EMERGENCY MEDICINE | Admitting: EMERGENCY MEDICINE
Payer: MEDICARE

## 2024-03-24 VITALS
RESPIRATION RATE: 18 BRPM | DIASTOLIC BLOOD PRESSURE: 83 MMHG | HEART RATE: 83 BPM | TEMPERATURE: 98.1 F | SYSTOLIC BLOOD PRESSURE: 171 MMHG | HEIGHT: 67 IN | BODY MASS INDEX: 26.99 KG/M2 | WEIGHT: 171.96 LBS | OXYGEN SATURATION: 97 %

## 2024-03-24 DIAGNOSIS — R60.0 LOWER EXTREMITY EDEMA: ICD-10-CM

## 2024-03-24 DIAGNOSIS — M54.9 MUSCULOSKELETAL BACK PAIN: Primary | ICD-10-CM

## 2024-03-24 DIAGNOSIS — R41.0 INTERMITTENT CONFUSION: ICD-10-CM

## 2024-03-24 LAB
ALBUMIN SERPL-MCNC: 3.4 G/DL (ref 3.5–5.2)
ALBUMIN/GLOB SERPL: 1.1 G/DL
ALP SERPL-CCNC: 72 U/L (ref 39–117)
ALT SERPL W P-5'-P-CCNC: 16 U/L (ref 1–41)
ANION GAP SERPL CALCULATED.3IONS-SCNC: 12.1 MMOL/L (ref 5–15)
AST SERPL-CCNC: 26 U/L (ref 1–40)
BACTERIA UR QL AUTO: NORMAL /HPF
BASOPHILS # BLD AUTO: 0.02 10*3/MM3 (ref 0–0.2)
BASOPHILS NFR BLD AUTO: 0.2 % (ref 0–1.5)
BILIRUB SERPL-MCNC: 0.5 MG/DL (ref 0–1.2)
BILIRUB UR QL STRIP: NEGATIVE
BUN SERPL-MCNC: 96 MG/DL (ref 8–23)
BUN/CREAT SERPL: 16.1 (ref 7–25)
CALCIUM SPEC-SCNC: 8.4 MG/DL (ref 8.6–10.5)
CHLORIDE SERPL-SCNC: 105 MMOL/L (ref 98–107)
CLARITY UR: CLEAR
CO2 SERPL-SCNC: 21.9 MMOL/L (ref 22–29)
COLOR UR: YELLOW
CREAT SERPL-MCNC: 5.97 MG/DL (ref 0.76–1.27)
D-LACTATE SERPL-SCNC: 0.9 MMOL/L (ref 0.5–2)
DEPRECATED RDW RBC AUTO: 58.5 FL (ref 37–54)
EGFRCR SERPLBLD CKD-EPI 2021: 9 ML/MIN/1.73
EOSINOPHIL # BLD AUTO: 0 10*3/MM3 (ref 0–0.4)
EOSINOPHIL NFR BLD AUTO: 0 % (ref 0.3–6.2)
ERYTHROCYTE [DISTWIDTH] IN BLOOD BY AUTOMATED COUNT: 16.1 % (ref 12.3–15.4)
GLOBULIN UR ELPH-MCNC: 3.1 GM/DL
GLUCOSE SERPL-MCNC: 149 MG/DL (ref 65–99)
GLUCOSE UR STRIP-MCNC: ABNORMAL MG/DL
HCT VFR BLD AUTO: 30.8 % (ref 37.5–51)
HGB BLD-MCNC: 9.2 G/DL (ref 13–17.7)
HGB UR QL STRIP.AUTO: ABNORMAL
HOLD SPECIMEN: NORMAL
HOLD SPECIMEN: NORMAL
HYALINE CASTS UR QL AUTO: NORMAL /LPF
IMM GRANULOCYTES # BLD AUTO: 0.06 10*3/MM3 (ref 0–0.05)
IMM GRANULOCYTES NFR BLD AUTO: 0.7 % (ref 0–0.5)
KETONES UR QL STRIP: NEGATIVE
LEUKOCYTE ESTERASE UR QL STRIP.AUTO: NEGATIVE
LIPASE SERPL-CCNC: 64 U/L (ref 13–60)
LYMPHOCYTES # BLD AUTO: 1.15 10*3/MM3 (ref 0.7–3.1)
LYMPHOCYTES NFR BLD AUTO: 14.2 % (ref 19.6–45.3)
MAGNESIUM SERPL-MCNC: 1.4 MG/DL (ref 1.6–2.4)
MCH RBC QN AUTO: 29.7 PG (ref 26.6–33)
MCHC RBC AUTO-ENTMCNC: 29.9 G/DL (ref 31.5–35.7)
MCV RBC AUTO: 99.4 FL (ref 79–97)
MONOCYTES # BLD AUTO: 0.29 10*3/MM3 (ref 0.1–0.9)
MONOCYTES NFR BLD AUTO: 3.6 % (ref 5–12)
NEUTROPHILS NFR BLD AUTO: 6.6 10*3/MM3 (ref 1.7–7)
NEUTROPHILS NFR BLD AUTO: 81.3 % (ref 42.7–76)
NITRITE UR QL STRIP: NEGATIVE
NRBC BLD AUTO-RTO: 0 /100 WBC (ref 0–0.2)
PH UR STRIP.AUTO: 7 [PH] (ref 5–8)
PLATELET # BLD AUTO: 149 10*3/MM3 (ref 140–450)
PMV BLD AUTO: 10.4 FL (ref 6–12)
POTASSIUM SERPL-SCNC: 5.3 MMOL/L (ref 3.5–5.2)
PROT SERPL-MCNC: 6.5 G/DL (ref 6–8.5)
PROT UR QL STRIP: ABNORMAL
QT INTERVAL: 399 MS
QTC INTERVAL: 460 MS
RBC # BLD AUTO: 3.1 10*6/MM3 (ref 4.14–5.8)
RBC # UR STRIP: NORMAL /HPF
REF LAB TEST METHOD: NORMAL
SODIUM SERPL-SCNC: 139 MMOL/L (ref 136–145)
SP GR UR STRIP: 1.01 (ref 1–1.03)
SQUAMOUS #/AREA URNS HPF: NORMAL /HPF
URATE SERPL-MCNC: 6.6 MG/DL (ref 3.4–7)
UROBILINOGEN UR QL STRIP: ABNORMAL
WBC # UR STRIP: NORMAL /HPF
WBC NRBC COR # BLD AUTO: 8.12 10*3/MM3 (ref 3.4–10.8)
WHOLE BLOOD HOLD COAG: NORMAL
WHOLE BLOOD HOLD SPECIMEN: NORMAL

## 2024-03-24 PROCEDURE — 81001 URINALYSIS AUTO W/SCOPE: CPT | Performed by: EMERGENCY MEDICINE

## 2024-03-24 PROCEDURE — 93005 ELECTROCARDIOGRAM TRACING: CPT | Performed by: EMERGENCY MEDICINE

## 2024-03-24 PROCEDURE — 74176 CT ABD & PELVIS W/O CONTRAST: CPT

## 2024-03-24 PROCEDURE — 85025 COMPLETE CBC W/AUTO DIFF WBC: CPT

## 2024-03-24 PROCEDURE — 71045 X-RAY EXAM CHEST 1 VIEW: CPT

## 2024-03-24 PROCEDURE — 80053 COMPREHEN METABOLIC PANEL: CPT | Performed by: EMERGENCY MEDICINE

## 2024-03-24 PROCEDURE — 84550 ASSAY OF BLOOD/URIC ACID: CPT | Performed by: EMERGENCY MEDICINE

## 2024-03-24 PROCEDURE — 83735 ASSAY OF MAGNESIUM: CPT | Performed by: EMERGENCY MEDICINE

## 2024-03-24 PROCEDURE — 83605 ASSAY OF LACTIC ACID: CPT | Performed by: EMERGENCY MEDICINE

## 2024-03-24 PROCEDURE — 83690 ASSAY OF LIPASE: CPT | Performed by: EMERGENCY MEDICINE

## 2024-03-24 PROCEDURE — 99284 EMERGENCY DEPT VISIT MOD MDM: CPT

## 2024-03-24 PROCEDURE — 93970 EXTREMITY STUDY: CPT

## 2024-03-24 RX ORDER — SODIUM CHLORIDE 0.9 % (FLUSH) 0.9 %
10 SYRINGE (ML) INJECTION AS NEEDED
Status: DISCONTINUED | OUTPATIENT
Start: 2024-03-24 | End: 2024-03-24 | Stop reason: HOSPADM

## 2024-03-24 NOTE — ED TRIAGE NOTES
Pt comes to the ER tonight for right sided flank pain. Pt states that its not coming around to his abd. Pt is also having right leg swelling.

## 2024-03-24 NOTE — DISCHARGE INSTRUCTIONS
Take medications as directed.  Do not take your steroids.  Return for worsening symptoms.  Follow-up with your doctors tomorrow.

## 2024-03-24 NOTE — ED PROVIDER NOTES
Time: 6:10 AM EDT  Date of encounter:  3/24/2024  Independent Historian/Clinical History and Information was obtained by:   Patient and Family    History is limited by: N/A    Chief Complaint: Flank pain and lower extremity swelling.      History of Present Illness:  Patient is a 79 y.o. year old male who presents to the emergency department for evaluation of flank pain and lower extremity swelling.  This patient is a peritoneal dialysis patient who also has a history of hypertension and diabetes and presents to the emergency department complaining of left flank pain and back pain which is going on for the last couple of days.  He states that the pain is worse with movement and better at rest.  In addition to the above complaints patient also states that he is having lower extremity edema primarily on the right lower extremity.  He has had no fever chills cough shortness of breath or other new complaints.  He has been eating and drinking well.    HPI    Patient Care Team  Primary Care Provider: Janna Mo MD    Past Medical History:     No Known Allergies  Past Medical History:   Diagnosis Date    Anemia     Ankle pain, right     CKD (chronic kidney disease), stage IV     Diabetes     Gout     High cholesterol     HL (hearing loss)     Hyperkalemia     Hypertension     Hypothyroidism     Psoriasis     Vitiligo      Past Surgical History:   Procedure Laterality Date    ANKLE SURGERY  11/1990    APPENDECTOMY N/A 1954    BRONCHOSCOPY N/A 3/1/2024    Procedure: BRONCHOSCOPY WITH BAL AND WASHINGS;  Surgeon: Sandra Espinal MD;  Location: [facility] ENDOSCOPY;  Service: Pulmonary;  Laterality: N/A;  PNEUMONIA    COLONOSCOPY N/A 06/2022    [facility]    HIP BIPOLAR REPLACEMENT Right 01/2000    INSERTION PERITONEAL DIALYSIS CATHETER N/A 3/27/2023    Procedure: LAPAROSCOPIC INSERTION PERITONEAL DIALYSIS CATHETER, LAPAROSCOPIC OMENTOPEXY WITH LYSIS OF ADHESIONS;  Surgeon: Jose Berry MD;  Location: [facility]  MAIN OR;  Service: General;  Laterality: N/A;    INSERTION PERITONEAL DIALYSIS CATHETER Left 7/23/2023    Procedure: REVISION OF PERITONEAL DIALYSIS CATHETER;  Surgeon: Radha Oreilly MD;  Location: [facility] MAIN OR;  Service: General;  Laterality: Left;    RENAL BIOPSY Left 07/15/2022     Family History   Problem Relation Age of Onset    Diabetes Mother     Heart disease Father     Cancer Sister 35    Diabetes Sister     Diabetes Brother     Heart disease Paternal Uncle     Malig Hyperthermia Neg Hx        Home Medications:  Prior to Admission medications    Medication Sig Start Date End Date Taking? Authorizing Provider   arformoterol (BROVANA) 15 MCG/2ML nebulizer solution Take 2 mL by nebulization 2 (Two) Times a Day for 30 days. Indications: Chronic Obstructive Lung Disease, J 44.9 3/22/24 4/21/24  Janna Mo MD   ARIPiprazole (ABILIFY) 2 MG tablet Take 1 tablet by mouth Every Night for 30 days. 3/22/24 4/21/24  Janna Mo MD   atorvastatin (LIPITOR) 40 MG tablet Take 1 tablet by mouth Daily for 30 days. 3/22/24 4/21/24  Janna Mo MD   budesonide (PULMICORT) 0.5 MG/2ML nebulizer solution Take 2 mL by nebulization 2 (Two) Times a Day for 30 days. 3/22/24 4/21/24  Janna Mo MD   carvedilol (COREG) 6.25 MG tablet Take 1 tablet by mouth 2 (Two) Times a Day for 30 days. 3/22/24 4/21/24  Janna Mo MD   cyclobenzaprine (FLEXERIL) 5 MG tablet Take 1 tablet by mouth 3 (Three) Times a Day As Needed for Muscle Spasms. 3/22/24   Janna Mo MD   gentamicin (GARAMYCIN) 0.1 % cream Apply 1 Application topically to the appropriate area as directed Daily. 3/10/24   Saúl Vargas MD   Insulin Glargine (LANTUS SOLOSTAR) 100 UNIT/ML injection pen Inject 20 Units under the skin into the appropriate area as directed Every Night. 3/22/24   Janna Mo MD   ipratropium-albuterol (DUO-NEB) 0.5-2.5 mg/3 ml nebulizer Take 3 mL by nebulization Every 4 (Four) Hours As  Needed for Wheezing or Shortness of Air. Indications: Chronic Obstructive Lung Disease, J44.9 3/22/24   Janna Mo MD   levothyroxine (SYNTHROID, LEVOTHROID) 150 MCG tablet Take 1 tablet by mouth Every Morning for 30 days. 3/22/24 4/21/24  Janna Mo MD   pantoprazole (PROTONIX) 40 MG EC tablet Take 1 tablet by mouth Every Morning for 30 days. 3/22/24 4/21/24  Janna Mo MD   predniSONE (DELTASONE) 20 MG tablet Take 2 tablets by mouth Daily for 5 days. 3/22/24 3/27/24  Janna Mo MD   Risankizumab-rzaa (SKYRIZI, 150 MG DOSE, SC) Inject  under the skin into the appropriate area as directed. Once every 3 months    Provider, Historical, MD   saccharomyces boulardii (FLORASTOR) 250 MG capsule Take 1 capsule by mouth 2 (Two) Times a Day for 30 days. 3/9/24 4/8/24  Saúl Vargas MD   sevelamer (RENVELA) 800 MG tablet Take 1 tablet by mouth 2 (Two) Times a Day With Meals for 30 days. 3/22/24 4/21/24  Janna Mo MD   torsemide (DEMADEX) 100 MG tablet Take 1 tablet by mouth Daily for 30 days. 3/22/24 4/21/24  Janna Mo MD        Social History:   Social History     Tobacco Use    Smoking status: Former     Current packs/day: 0.00     Average packs/day: 1 pack/day for 10.0 years (10.0 ttl pk-yrs)     Types: Cigarettes     Start date:      Quit date: 2000     Years since quittin.2     Passive exposure: Past    Smokeless tobacco: Never    Tobacco comments:     quit 25 years ago, chews nicotine gum   Vaping Use    Vaping status: Never Used   Substance Use Topics    Alcohol use: Yes     Comment: 1 DRINK/DAY    Drug use: Never         Review of Systems:  Review of Systems   Constitutional:  Negative for chills and fever.   HENT:  Negative for congestion, ear pain and sore throat.    Eyes:  Negative for pain.   Respiratory:  Negative for cough, chest tightness and shortness of breath.    Cardiovascular:  Positive for leg swelling. Negative for chest pain.  "  Gastrointestinal:  Negative for abdominal pain, diarrhea, nausea and vomiting.   Genitourinary:  Positive for flank pain. Negative for hematuria.   Musculoskeletal:  Positive for back pain. Negative for joint swelling.   Skin:  Negative for pallor.   Neurological:  Negative for seizures and headaches.   All other systems reviewed and are negative.       Physical Exam:  /83   Pulse 83   Temp 98.1 °F (36.7 °C) (Oral)   Resp 18   Ht 170.2 cm (67\")   Wt 78 kg (171 lb 15.3 oz)   SpO2 97%   BMI 26.93 kg/m²     Physical Exam  Vitals and nursing note reviewed.   Constitutional:       General: He is not in acute distress.     Appearance: Normal appearance. He is not toxic-appearing.   HENT:      Head: Normocephalic and atraumatic.      Mouth/Throat:      Mouth: Mucous membranes are moist.   Eyes:      General: No scleral icterus.  Cardiovascular:      Rate and Rhythm: Normal rate and regular rhythm.      Pulses: Normal pulses.      Heart sounds: Normal heart sounds.   Pulmonary:      Effort: Pulmonary effort is normal. No respiratory distress.      Breath sounds: Normal breath sounds.   Abdominal:      General: Abdomen is flat.      Palpations: Abdomen is soft.      Tenderness: There is no abdominal tenderness.      Comments: There is no abdominal tenderness noted.  The patient does have a peritoneal dialysis catheter in place.   Musculoskeletal:         General: Normal range of motion.      Cervical back: Normal range of motion and neck supple.      Right lower leg: Edema present.      Left lower leg: Edema present.      Comments: There is mild left lower extremity edema and significant right lower extremity edema noted.  Distal neurovascular function is intact.   Skin:     General: Skin is warm and dry.   Neurological:      General: No focal deficit present.      Mental Status: He is alert and oriented to person, place, and time. Mental status is at baseline.   Psychiatric:         Mood and Affect: Mood " normal.         Behavior: Behavior normal.         Thought Content: Thought content normal.         Judgment: Judgment normal.                  Procedures:  Procedures      Medical Decision Making:      Comorbidities that affect care:    Chronic Kidney Disease, Diabetes, Hypertension    External Notes reviewed:    Previous Admission Note: Admission for pneumonia and sepsis.      The following orders were placed and all results were independently analyzed by me:  Orders Placed This Encounter   Procedures    XR Chest 1 View    CT Abdomen Pelvis Without Contrast    [place] Draw    Comprehensive Metabolic Panel    Lipase    Urinalysis With Microscopic If Indicated (No Culture) - Urine, Clean Catch    Lactic Acid, Plasma    CBC Auto Differential    Uric Acid    Magnesium    Urinalysis, Microscopic Only - Urine, Clean Catch    Undress & Gown    ECG 12 Lead Rhythm Change    CBC & Differential    Green Top (Gel)    Lavender Top    Gold Top - SST    Light Blue Top       Medications Given in the Emergency Department:  Medications - No data to display       ED Course:       EKG: Sinus rhythm with rate 80 bpm  Normal P wave and WI interval  Nonspecific ST changes  Left axis deviation.  Labs:    Lab Results (last 24 hours)       Procedure Component Value Units Date/Time    CBC & Differential [[id number]]  (Abnormal) Collected: 03/24/24 0534    Specimen: Blood Updated: 03/24/24 0545    Narrative:      The following orders were created for panel order CBC & Differential.  Procedure                               Abnormality         Status                     ---------                               -----------         ------                     CBC Auto Differential[[id number]]        Abnormal            Final result                 Please view results for these tests on the individual orders.    Comprehensive Metabolic Panel [[id number]]  (Abnormal) Collected: 03/24/24 0534    Specimen: Blood Updated: 03/24/24 0629     Glucose 149  mg/dL      BUN 96 mg/dL      Creatinine 5.97 mg/dL      Sodium 139 mmol/L      Potassium 5.3 mmol/L      Chloride 105 mmol/L      CO2 21.9 mmol/L      Calcium 8.4 mg/dL      Total Protein 6.5 g/dL      Albumin 3.4 g/dL      ALT (SGPT) 16 U/L      AST (SGOT) 26 U/L      Alkaline Phosphatase 72 U/L      Total Bilirubin 0.5 mg/dL      Globulin 3.1 gm/dL      A/G Ratio 1.1 g/dL      BUN/Creatinine Ratio 16.1     Anion Gap 12.1 mmol/L      eGFR 9.0 mL/min/1.73      Comment: <15 Indicative of kidney failure       Narrative:      GFR Normal >60  Chronic Kidney Disease <60  Kidney Failure <15    The GFR formula is only valid for adults with stable renal function between ages 18 and 70.    Lipase [[id number]]  (Abnormal) Collected: 03/24/24 0534    Specimen: Blood Updated: 03/24/24 0629     Lipase 64 U/L     Lactic Acid, Plasma [[id number]]  (Normal) Collected: 03/24/24 0534    Specimen: Blood Updated: 03/24/24 0627     Lactate 0.9 mmol/L     CBC Auto Differential [[id number]]  (Abnormal) Collected: 03/24/24 0534    Specimen: Blood Updated: 03/24/24 0545     WBC 8.12 10*3/mm3      RBC 3.10 10*6/mm3      Hemoglobin 9.2 g/dL      Hematocrit 30.8 %      MCV 99.4 fL      MCH 29.7 pg      MCHC 29.9 g/dL      RDW 16.1 %      RDW-SD 58.5 fl      MPV 10.4 fL      Platelets 149 10*3/mm3      Neutrophil % 81.3 %      Lymphocyte % 14.2 %      Monocyte % 3.6 %      Eosinophil % 0.0 %      Basophil % 0.2 %      Immature Grans % 0.7 %      Neutrophils, Absolute 6.60 10*3/mm3      Lymphocytes, Absolute 1.15 10*3/mm3      Monocytes, Absolute 0.29 10*3/mm3      Eosinophils, Absolute 0.00 10*3/mm3      Basophils, Absolute 0.02 10*3/mm3      Immature Grans, Absolute 0.06 10*3/mm3      nRBC 0.0 /100 WBC     Uric Acid [[id number]]  (Normal) Collected: 03/24/24 0534    Specimen: Blood Updated: 03/24/24 0629     Uric Acid 6.6 mg/dL     Magnesium [[id number]]  (Abnormal) Collected: 03/24/24 0534    Specimen: Blood Updated: 03/24/24 0657     Magnesium  1.4 mg/dL     Urinalysis With Microscopic If Indicated (No Culture) - Urine, Clean Catch [[id number]]  (Abnormal) Collected: 03/24/24 0831    Specimen: Urine, Clean Catch Updated: 03/24/24 0901     Color, UA Yellow     Appearance, UA Clear     pH, UA 7.0     Specific Gravity, UA 1.010     Glucose,  mg/dL (1+)     Ketones, UA Negative     Bilirubin, UA Negative     Blood, UA Trace     Protein,  mg/dL (2+)     Leuk Esterase, UA Negative     Nitrite, UA Negative     Urobilinogen, UA 0.2 E.U./dL    Urinalysis, Microscopic Only - Urine, Clean Catch [[id number]] Collected: 03/24/24 0831    Specimen: Urine, Clean Catch Updated: 03/24/24 0901     RBC, UA 0-2 /HPF      WBC, UA 0-2 /HPF      Bacteria, UA None Seen /HPF      Squamous Epithelial Cells, UA 0-2 /HPF      Hyaline Casts, UA None Seen /LPF      Methodology Automated Microscopy             Imaging:    XR Chest 1 View    Result Date: 3/24/2024  XR CHEST 1 VW-  Date of Exam: 3/24/2024 7:40 AM  Indication: Shortness of breath  Comparison: 3/22/2024  Findings: The heart remains slightly enlarged. The pulmonary vascularity does not appear congested.  Airspace disease at the lung bases is improved.  Bony structures appear intact.      Impression: Cardiomegaly, unchanged.  Airspace disease at the lung bases is improved.   Electronically Signed By-JERRY MENCHACA MD On:3/24/2024 7:58 AM      CT Abdomen Pelvis Without Contrast    Result Date: 3/24/2024  CT ABDOMEN PELVIS WO CONTRAST-  Date of Exam: 3/24/2024 7:09 AM  Indication: Left flank pain.  Comparison: 2/22/2024  Technique: Axial CT images were obtained of the abdomen and pelvis without the administration of contrast. Reconstructed coronal and sagittal images were also obtained. Automated exposure control and iterative construction methods were used.   Findings: Right lower lobe pneumonia with area of consolidation measuring 3 cm in greatest dimension is slightly improved in comparison to 2/22/2024.  The liver  is of normal size. The gallbladder is not abnormally distended. Gallstones are again seen. No pancreatic or adrenal mass is evident. The spleen is of normal size.  Bilateral renal cysts where well-characterized on prior contrast-enhanced exam. The renal cortices are thinned. 1.2 cm solitary nonobstructing stone in the right renal pelvis is unchanged. No ureteral stones are seen. There is no evidence of hydronephrosis. The urinary bladder is not abnormally distended. Streak artifact from right total hip prosthesis obscures pelvic contents.  Peritoneal dialysis catheter coiled in the pelvis is in unchanged position. Peritoneal fluid consistent with dialysate is unchanged. A small amount of free air is evident.  Bowel loops are not abnormally dilated. No significant stool is evident. Mild diverticulosis of the descending colon and sigmoid colon is evident.  Small umbilical hernia contains peritoneal fluid.  Degenerative changes are seen in the lower thoracic and lumbar spine. Chronic mild anterior wedging compression fracture of the superior endplate of L3 is unchanged.      Impression: CT scan of the abdomen and pelvis without contrast demonstrating right lower lobe pneumonia, slightly improved in comparison to 2/22/2024. Small right effusion appears smaller.  1.2 cm nonobstructing stone in the right renal pelvis is unchanged.  Cholelithiasis.  Mild diverticulosis of the descending colon and sigmoid colon without diverticulitis.  Peritoneal fluid consistent with peritoneal dialysis.      Electronically Signed By-JERRY MENCHACA MD On:3/24/2024 7:56 AM         Differential Diagnosis and Discussion:    Back Pain: The patient presents with back pain. My differential diagnosis includes but is not limited to acute spinal epidural abscess, acute spinal epidural bleed, cauda equina syndrome, abdominal aortic aneurysm, aortic dissection, kidney stone, pyelonephritis, musculoskeletal back pain, spinal fracture, and  osteoarthritis.   Edema: Based on the patient's history, signs, and symptoms, the differential diagnosis includes but is not limited to heart failure, kidney disease, liver disease, venous insufficiency, lymphedema, pregnancy, medications, allergic reactions.    All labs were reviewed and interpreted by me.  EKG was interpreted by me.    MDM  Number of Diagnoses or Management Options  Intermittent confusion  Lower extremity edema  Musculoskeletal back pain  Diagnosis management comments: The patient did have some confusion while he was in the emergency department and his family states that he has had some intermittent confusion.  The family members in particular his daughter feel that this is secondary to his steroid use recently.  They prefer to have him discharged home and he will stop his steroids at this time       Amount and/or Complexity of Data Reviewed  Clinical lab tests: reviewed  Tests in the radiology section of CPT®: reviewed  Tests in the medicine section of CPT®: reviewed                 Patient Care Considerations:    CT CHEST: I considered ordering a CT scan of the chest, however the patient currently has no chest symptoms.      Consultants/Shared Management Plan:    None    Social Determinants of Health:    Patient has presented with family members who are responsible, reliable and will ensure follow up care.      Disposition and Care Coordination:    Discharged: The patient is suitable and stable for discharge with no need for consideration of admission.    I have explained discharge medications and the need for follow up with the patient/caretakers. This was also printed in the discharge instructions. Patient was discharged with the following medications and follow up:      Medication List      No changes were made to your prescriptions during this visit.      Janna Mo MD  [address] [address]  [phone]    In 1 day      Coreen Castro MD  [address] [place] [address]  [address] [address]  [phone]    In 1 day         Final diagnoses:   Musculoskeletal back pain   Lower extremity edema   Intermittent confusion        ED Disposition       ED Disposition   Discharge    Condition   Stable    Comment   --               This medical record created using voice recognition software.             Fredrick Mclaughlin, DO  03/24/24 1448

## 2024-03-25 LAB
BH CV LOWER VASCULAR LEFT COMMON FEMORAL AUGMENT: NORMAL
BH CV LOWER VASCULAR LEFT COMMON FEMORAL COMPETENT: NORMAL
BH CV LOWER VASCULAR LEFT COMMON FEMORAL COMPRESS: NORMAL
BH CV LOWER VASCULAR LEFT COMMON FEMORAL PHASIC: NORMAL
BH CV LOWER VASCULAR LEFT COMMON FEMORAL SPONT: NORMAL
BH CV LOWER VASCULAR LEFT DISTAL FEMORAL COMPRESS: NORMAL
BH CV LOWER VASCULAR LEFT GASTRONEMIUS COMPRESS: NORMAL
BH CV LOWER VASCULAR LEFT GREATER SAPH AK COMPRESS: NORMAL
BH CV LOWER VASCULAR LEFT GREATER SAPH BK COMPRESS: NORMAL
BH CV LOWER VASCULAR LEFT LESSER SAPH COMPRESS: NORMAL
BH CV LOWER VASCULAR LEFT MID FEMORAL AUGMENT: NORMAL
BH CV LOWER VASCULAR LEFT MID FEMORAL COMPETENT: NORMAL
BH CV LOWER VASCULAR LEFT MID FEMORAL COMPRESS: NORMAL
BH CV LOWER VASCULAR LEFT MID FEMORAL PHASIC: NORMAL
BH CV LOWER VASCULAR LEFT MID FEMORAL SPONT: NORMAL
BH CV LOWER VASCULAR LEFT PERONEAL COMPRESS: NORMAL
BH CV LOWER VASCULAR LEFT POPLITEAL AUGMENT: NORMAL
BH CV LOWER VASCULAR LEFT POPLITEAL COMPETENT: NORMAL
BH CV LOWER VASCULAR LEFT POPLITEAL COMPRESS: NORMAL
BH CV LOWER VASCULAR LEFT POPLITEAL PHASIC: NORMAL
BH CV LOWER VASCULAR LEFT POPLITEAL SPONT: NORMAL
BH CV LOWER VASCULAR LEFT POSTERIOR TIBIAL COMPRESS: NORMAL
BH CV LOWER VASCULAR LEFT PROXIMAL FEMORAL COMPRESS: NORMAL
BH CV LOWER VASCULAR LEFT SAPHENOFEMORAL JUNCTION COMPRESS: NORMAL
BH CV LOWER VASCULAR RIGHT COMMON FEMORAL AUGMENT: NORMAL
BH CV LOWER VASCULAR RIGHT COMMON FEMORAL COMPETENT: NORMAL
BH CV LOWER VASCULAR RIGHT COMMON FEMORAL COMPRESS: NORMAL
BH CV LOWER VASCULAR RIGHT COMMON FEMORAL PHASIC: NORMAL
BH CV LOWER VASCULAR RIGHT COMMON FEMORAL SPONT: NORMAL
BH CV LOWER VASCULAR RIGHT DISTAL FEMORAL COMPRESS: NORMAL
BH CV LOWER VASCULAR RIGHT GASTRONEMIUS COMPRESS: NORMAL
BH CV LOWER VASCULAR RIGHT GREATER SAPH AK COMPRESS: NORMAL
BH CV LOWER VASCULAR RIGHT LESSER SAPH COMPRESS: NORMAL
BH CV LOWER VASCULAR RIGHT MID FEMORAL AUGMENT: NORMAL
BH CV LOWER VASCULAR RIGHT MID FEMORAL COMPETENT: NORMAL
BH CV LOWER VASCULAR RIGHT MID FEMORAL COMPRESS: NORMAL
BH CV LOWER VASCULAR RIGHT MID FEMORAL PHASIC: NORMAL
BH CV LOWER VASCULAR RIGHT MID FEMORAL SPONT: NORMAL
BH CV LOWER VASCULAR RIGHT PERONEAL COMPRESS: NORMAL
BH CV LOWER VASCULAR RIGHT POPLITEAL AUGMENT: NORMAL
BH CV LOWER VASCULAR RIGHT POPLITEAL COMPETENT: NORMAL
BH CV LOWER VASCULAR RIGHT POPLITEAL COMPRESS: NORMAL
BH CV LOWER VASCULAR RIGHT POPLITEAL PHASIC: NORMAL
BH CV LOWER VASCULAR RIGHT POPLITEAL SPONT: NORMAL
BH CV LOWER VASCULAR RIGHT POSTERIOR TIBIAL COMPRESS: NORMAL
BH CV LOWER VASCULAR RIGHT PROXIMAL FEMORAL COMPRESS: NORMAL
BH CV LOWER VASCULAR RIGHT SAPHENOFEMORAL JUNCTION COMPRESS: NORMAL
BH CV VAS PRELIMINARY FINDINGS SCRIPTING: 1

## 2024-03-27 DIAGNOSIS — R73.03 PREDIABETES: ICD-10-CM

## 2024-03-27 DIAGNOSIS — D64.9 ANEMIA, UNSPECIFIED TYPE: Primary | ICD-10-CM

## 2024-03-27 DIAGNOSIS — R79.89 ELEVATED TSH: ICD-10-CM

## 2024-03-28 ENCOUNTER — PRIOR AUTHORIZATION (OUTPATIENT)
Dept: INTERNAL MEDICINE | Facility: CLINIC | Age: 79
End: 2024-03-28
Payer: MEDICARE

## 2024-03-28 RX ORDER — ARIPIPRAZOLE 2 MG/1
4 TABLET ORAL DAILY
Qty: 180 TABLET | Refills: 0 | Status: SHIPPED | OUTPATIENT
Start: 2024-03-28

## 2024-03-28 NOTE — TELEPHONE ENCOUNTER
Alpa SANDHU denied      Why did we deny your request?  We denied this request under Medicare Part D because:  We were informed that the drug is being administered to you via a nebulizer and you are not residing in a   skilled nursing facility/ long term care facility. The Social Security Act states that a drug prescribed to a   Part D eligible individual cannot be considered a covered Part D drug if payment for the drug is or would   be available under Medicare Part A or Part B. Medicare covers drugs that use durable medical   equipment, such as drugs given by a nebulizer, under the Part B benefit and not the Part D benefit. This   request was denied under your Medicare Part D benefit; however, it may be covered under Medicare Part   A or Part B. For more information, talk to your prescriber or call 3- 685-MEDICARE. If in the future you   need this drug and the pharmacy providing it to you submits indication that you are a resident of a skilled   nursing facility/long term care facility, you will be able to receive payment under Part D with no further   review required by your plan.

## 2024-03-28 NOTE — CONSULTS
[facility]   PSYCHIATRIC CONSULTATION    Patient Name: Nelson Wang  : 1945  MRN: [id number]  Primary Care Physician:  Janna Mo MD  Date of admission: 2024    Referring Provider: JESÚS Rudd MD  Reason for Consultation: altered mental status    Subjective   Subjective     Chief Complaint: Restless and obtunded    HPI:     Nelson Wang is a 79 y.o. male with multiple medical problem that included end-stage renal disease he is on peritoneal dialysis, immunosuppression, diabetes, hypertension, coronary artery disease was referred for psychiatric consult due to agitation and altered mental status.  The patient was recently treated in the ICU due to pneumonia and peritonitis.  According to the nursing staff the patient was initially calm, pleasantly confused at times however since yesterday the patient became more restless and even agitated.  On my assessment the patient presented as restless, he did not respond when his name was called, kept his eyes closed, after this writer lightly touched him the patient opened his eyes for a few seconds and then closed them back.  The patient wife was in the room and she noticed the change in patient presentation.  The wife provided some the answers, she said that this morning he did not respond when she entered the room.  According to the orders there was no new medication started for the last 48 hours.    Review of Systems   All systems were reviewed and negative except for: Obtunded consciousness    Personal History     Past Medical History:   Diagnosis Date    Anemia     Ankle pain, right     CKD (chronic kidney disease), stage IV     Diabetes     Gout     High cholesterol     HL (hearing loss)     Hyperkalemia     Hypertension     Hypothyroidism     Psoriasis     Vitiligo        Past Surgical History:   Procedure Laterality Date    ANKLE SURGERY  1990    APPENDECTOMY N/A     COLONOSCOPY N/A 2022    [facility]    HIP BIPOLAR  pleasant, well nourished, well developed, in no acute distress , normal communication ability, USING WALKER REPLACEMENT Right 2000    INSERTION PERITONEAL DIALYSIS CATHETER N/A 3/27/2023    Procedure: LAPAROSCOPIC INSERTION PERITONEAL DIALYSIS CATHETER, LAPAROSCOPIC OMENTOPEXY WITH LYSIS OF ADHESIONS;  Surgeon: Jose Berry MD;  Location: [facility] OR;  Service: General;  Laterality: N/A;    INSERTION PERITONEAL DIALYSIS CATHETER Left 2023    Procedure: REVISION OF PERITONEAL DIALYSIS CATHETER;  Surgeon: Radha Oreilly MD;  Location: [facility] MAIN OR;  Service: General;  Laterality: Left;    RENAL BIOPSY Left 07/15/2022       Past Psychiatric History: No h/o mental illness    Psychiatric Hospitalizations: none    Suicide Attempts: none    Prior Treatment and Medications Tried: none        Family History: family history includes Cancer (age of onset: 35) in his sister; Diabetes in his brother, mother, and sister; Heart disease in his father and paternal uncle. Otherwise pertinent FHx was reviewed and not pertinent to current issue.    Social History:     Social History     Socioeconomic History    Marital status:    Tobacco Use    Smoking status: Former     Current packs/day: 0.00     Average packs/day: 1 pack/day for 10.0 years (10.0 ttl pk-yrs)     Types: Cigarettes     Start date:      Quit date: 2000     Years since quittin.1     Passive exposure: Past    Smokeless tobacco: Never    Tobacco comments:     quit 25 years ago, chews nicotine gum   Vaping Use    Vaping status: Never Used   Substance and Sexual Activity    Alcohol use: Yes     Comment: 1 DRINK/DAY    Drug use: Never    Sexual activity: Defer       Substance Abuse History: reports that he quit smoking about 24 years ago. His smoking use included cigarettes. He started smoking about 34 years ago. He has a 10 pack-year smoking history. He has been exposed to tobacco smoke. He has never used smokeless tobacco. He reports current alcohol use. He reports that he does not use drugs.    Home Medications:  Insulin Glargine,  Lidocaine (Anorectal), Risankizumab-rzaa, Turmeric, allopurinol, ascorbic acid, atorvastatin, carvedilol, cefTRIAXone, fish oil, levothyroxine, sertraline, sevelamer, sodium bicarbonate, and torsemide      Allergies:  No Known Allergies    Objective   Objective     Vitals:   Temp:  [97.2 °F (36.2 °C)-98.7 °F (37.1 °C)] 98.1 °F (36.7 °C)  Heart Rate:  [41-84] 52  Resp:  [17-19] 17  BP: (101-127)/(52-60) 121/52  Flow (L/min):  [2] 2  Physical Exam       Mental Status Exam:     Hygiene:   fair  Cooperation:   unable to due to be obtanded  Eye Contact:  Closed  Psychomotor Behavior:  Restless  Mood: TITO  Affect:   sedated  Speech:   didn't respond verbally  Language: tito  Thought Process:  Unable to demonstrate  Thought Content:  Unable to demonstrate  Suicidal:   tito  Homicidal:   tito  Hallucinations:   tito  Delusion:  Other tito  Memory:  Unable to evaluate  Orientation:  Unable to evaluate  Reliability:   tito  Insight:   tito  Judgement:   tito  Impulse Control:  Fair    Result Review    Result Review:  I have personally reviewed the results from the time of this admission to 3/3/2024 12:10 EST and agree with these findings:  []  Laboratory  []  Microbiology  []  Radiology  []  EKG/Telemetry   []  Cardiology/Vascular   []  Pathology  []  Old records  []  Other:  Most notable findings include: anemia    Assessment & Plan   Assessment / Plan     Brief Patient Summary:  Nelson Wang is a 79 y.o. male who was admitted due to shortness of breath,was referred to psychiatric consult due to altered mental status    Active Hospital Problems:  Active Hospital Problems    Diagnosis     **Acute on chronic respiratory failure with hypoxia     HCAP (healthcare-associated pneumonia)     ESRD (end stage renal disease)     Aspiration pneumonitis     Peritoneal dialysis catheter in place     Essential hypertension     Anemia due to chronic kidney disease     Type 2 diabetes mellitus without complication    Altered mental  status      Plan:   Continue close watch; the presence of the family member in the room is very important ; agreed with use of Seroquel 12.5 mg q6h prn for agitation        Part of this note may be an electronic transcription/translation of spoken language to printed text using the Dragon Dictation System.    Electronically signed by Jessy Vásquez MD, 03/03/24, 12:10 PM EST.

## 2024-03-28 NOTE — TELEPHONE ENCOUNTER
Please call and let him know that his muscle relaxer was denied by his insurance due to his age.  Please ask how he is feeling now.

## 2024-03-28 NOTE — TELEPHONE ENCOUNTER
Please call and let him know that his insurance denied his pulmicort.  I called in a qvar inhaler for him to take instead of the pulmicort.

## 2024-03-28 NOTE — TELEPHONE ENCOUNTER
Cyclobenzaprine PA denied    Why did we deny your request?  We denied this request under Medicare Part D because:  The information received from your physician does not support approval of this drug under your Medicare   Part D benefit because the Food and Drug Administration (FDA) has not approved the use of the   requested medication for the diagnosis provided and/or use of the requested medication for the diagnosis   provided is not listed in any of the clinical resources approved by the Centers for Medicare and Medicaid   Services (CMS) for use in evaluating Part D coverage. Your plan's formulary has a restriction on this drug.   This restriction has been approved by CMS. This restriction only applies to patients greater than or equal   to 65 years of age. Coverage is provided when the diagnosis provided by your prescriber is considered a   Medicare Part D use of the medication and your physician confirms they have weighed the safety versus   benefit of this medication. Examples of common Medicare Part D covered uses are treatment of acute   musculoskeletal conditions or acute muscle spasms.

## 2024-04-01 ENCOUNTER — PRIOR AUTHORIZATION (OUTPATIENT)
Dept: INTERNAL MEDICINE | Facility: CLINIC | Age: 79
End: 2024-04-01
Payer: MEDICARE

## 2024-04-01 NOTE — TELEPHONE ENCOUNTER
Aripiprazole 2MG approved    Approved;Review Type:Prior Auth;Coverage Start Date:03/02/2024  Coverage End Date:04/01/2025

## 2024-04-02 ENCOUNTER — TELEPHONE (OUTPATIENT)
Dept: GASTROENTEROLOGY | Facility: CLINIC | Age: 79
End: 2024-04-02

## 2024-04-02 ENCOUNTER — PRIOR AUTHORIZATION (OUTPATIENT)
Dept: INTERNAL MEDICINE | Facility: CLINIC | Age: 79
End: 2024-04-02
Payer: MEDICARE

## 2024-04-02 ENCOUNTER — TELEPHONE (OUTPATIENT)
Dept: INTERNAL MEDICINE | Facility: CLINIC | Age: 79
End: 2024-04-02
Payer: MEDICARE

## 2024-04-02 NOTE — TELEPHONE ENCOUNTER
LVM for pt to return our call. Pt is schedule for appt in Aug and that is when we will schedule his colonoscopy.

## 2024-04-02 NOTE — TELEPHONE ENCOUNTER
PA approved for Apripiprazole 4mg.   CaseId:[id number];Status:Approved;Review Type:Prior Auth;Coverage Start Date:03/02/2024;Coverage End Date:04/01/2025;  Authorization Expiration Date: 3/31/2025    Cole Martin message sent to zack.

## 2024-04-02 NOTE — TELEPHONE ENCOUNTER
Caller: FIDELIA    Relationship: [facility]    Best call back number: [phone]    What is the best time to reach you: ANYTIME    Who are you requesting to speak with (clinical staff, provider,  specific staff member): CLINICAL    Do you know the name of the person who called: JUAN CJACOB    What was the call regarding: FIDELIA WAS CALLING TO EXTEND NURSE ORDERS FOR THIS PATIENT. PLEASE CALL HER BACK AND ADVISE. THEY WILL FAX THE ORDER OVER. PLEASE FILL OUT AND FAX BACK.    Is it okay if the provider responds through MyChart: N/A

## 2024-04-02 NOTE — TELEPHONE ENCOUNTER
Hub staff attempted to follow warm transfer process and was unsuccessful     Caller: Jose Wang    Relationship to patient: Emergency Contact    Best call back number: [phone]    Patient is needing: PT DAUGHTER IS RETURNING A MISS CALL FROM THE OFFICE. PLEASE GIVE PT DAUGHTER A CALL BACK AND IF NOT ABLE TO REACH PT. IT IS OKAY TO LEAVE AN VOICEMAIL.

## 2024-04-05 LAB
MYCOBACTERIUM SPEC CULT: NORMAL
NIGHT BLUE STAIN TISS: NORMAL
NIGHT BLUE STAIN TISS: NORMAL

## 2024-04-08 ENCOUNTER — HOSPITAL ENCOUNTER (INPATIENT)
Facility: HOSPITAL | Age: 79
LOS: 5 days | Discharge: HOME HEALTH | DRG: 907 | End: 2024-04-14
Attending: EMERGENCY MEDICINE | Admitting: HOSPITALIST
Payer: MEDICARE

## 2024-04-08 ENCOUNTER — APPOINTMENT (OUTPATIENT)
Dept: GENERAL RADIOLOGY | Facility: HOSPITAL | Age: 79
DRG: 907 | End: 2024-04-08
Payer: MEDICARE

## 2024-04-08 ENCOUNTER — APPOINTMENT (OUTPATIENT)
Dept: CT IMAGING | Facility: HOSPITAL | Age: 79
DRG: 907 | End: 2024-04-08
Payer: MEDICARE

## 2024-04-08 ENCOUNTER — TELEPHONE (OUTPATIENT)
Dept: SURGERY | Facility: CLINIC | Age: 79
End: 2024-04-08
Payer: MEDICARE

## 2024-04-08 ENCOUNTER — TELEPHONE (OUTPATIENT)
Dept: INTERNAL MEDICINE | Facility: CLINIC | Age: 79
End: 2024-04-08
Payer: MEDICARE

## 2024-04-08 DIAGNOSIS — R06.83 SNORING: Primary | ICD-10-CM

## 2024-04-08 DIAGNOSIS — N18.6 ESRD ON PERITONEAL DIALYSIS: ICD-10-CM

## 2024-04-08 DIAGNOSIS — K65.2 SBP (SPONTANEOUS BACTERIAL PERITONITIS): ICD-10-CM

## 2024-04-08 DIAGNOSIS — A41.9 SEPSIS, DUE TO UNSPECIFIED ORGANISM, UNSPECIFIED WHETHER ACUTE ORGAN DYSFUNCTION PRESENT: Primary | ICD-10-CM

## 2024-04-08 DIAGNOSIS — K65.2 SPONTANEOUS BACTERIAL PERITONITIS: ICD-10-CM

## 2024-04-08 DIAGNOSIS — N18.6 END STAGE RENAL DISEASE: ICD-10-CM

## 2024-04-08 DIAGNOSIS — B49 FUNGAL INFECTION: ICD-10-CM

## 2024-04-08 DIAGNOSIS — Z99.2 ESRD ON PERITONEAL DIALYSIS: ICD-10-CM

## 2024-04-08 LAB
ALBUMIN SERPL-MCNC: 2.8 G/DL (ref 3.5–5.2)
ALBUMIN/GLOB SERPL: 0.9 G/DL
ALP SERPL-CCNC: 55 U/L (ref 39–117)
ALT SERPL W P-5'-P-CCNC: 10 U/L (ref 1–41)
ANION GAP SERPL CALCULATED.3IONS-SCNC: 14.7 MMOL/L (ref 5–15)
AST SERPL-CCNC: 19 U/L (ref 1–40)
BASOPHILS # BLD AUTO: 0.01 10*3/MM3 (ref 0–0.2)
BASOPHILS NFR BLD AUTO: 0.1 % (ref 0–1.5)
BILIRUB SERPL-MCNC: 0.4 MG/DL (ref 0–1.2)
BUN SERPL-MCNC: 46 MG/DL (ref 8–23)
BUN/CREAT SERPL: 7.3 (ref 7–25)
CALCIUM SPEC-SCNC: 8.3 MG/DL (ref 8.6–10.5)
CHLORIDE SERPL-SCNC: 95 MMOL/L (ref 98–107)
CO2 SERPL-SCNC: 24.3 MMOL/L (ref 22–29)
CREAT SERPL-MCNC: 6.31 MG/DL (ref 0.76–1.27)
D-LACTATE SERPL-SCNC: 1.6 MMOL/L (ref 0.5–2)
DEPRECATED RDW RBC AUTO: 48.3 FL (ref 37–54)
EGFRCR SERPLBLD CKD-EPI 2021: 8.4 ML/MIN/1.73
EOSINOPHIL # BLD AUTO: 0.08 10*3/MM3 (ref 0–0.4)
EOSINOPHIL NFR BLD AUTO: 0.8 % (ref 0.3–6.2)
ERYTHROCYTE [DISTWIDTH] IN BLOOD BY AUTOMATED COUNT: 14.4 % (ref 12.3–15.4)
GLOBULIN UR ELPH-MCNC: 3.2 GM/DL
GLUCOSE SERPL-MCNC: 122 MG/DL (ref 65–99)
HCT VFR BLD AUTO: 27.5 % (ref 37.5–51)
HGB BLD-MCNC: 8.6 G/DL (ref 13–17.7)
IMM GRANULOCYTES # BLD AUTO: 0.08 10*3/MM3 (ref 0–0.05)
IMM GRANULOCYTES NFR BLD AUTO: 0.8 % (ref 0–0.5)
LYMPHOCYTES # BLD AUTO: 1.45 10*3/MM3 (ref 0.7–3.1)
LYMPHOCYTES NFR BLD AUTO: 14.6 % (ref 19.6–45.3)
MCH RBC QN AUTO: 29.3 PG (ref 26.6–33)
MCHC RBC AUTO-ENTMCNC: 31.3 G/DL (ref 31.5–35.7)
MCV RBC AUTO: 93.5 FL (ref 79–97)
MONOCYTES # BLD AUTO: 0.93 10*3/MM3 (ref 0.1–0.9)
MONOCYTES NFR BLD AUTO: 9.3 % (ref 5–12)
NEUTROPHILS NFR BLD AUTO: 7.41 10*3/MM3 (ref 1.7–7)
NEUTROPHILS NFR BLD AUTO: 74.4 % (ref 42.7–76)
NRBC BLD AUTO-RTO: 0 /100 WBC (ref 0–0.2)
PLATELET # BLD AUTO: 128 10*3/MM3 (ref 140–450)
PMV BLD AUTO: 10.8 FL (ref 6–12)
POTASSIUM SERPL-SCNC: 4.2 MMOL/L (ref 3.5–5.2)
PROCALCITONIN SERPL-MCNC: 1.25 NG/ML (ref 0–0.25)
PROT SERPL-MCNC: 6 G/DL (ref 6–8.5)
RBC # BLD AUTO: 2.94 10*6/MM3 (ref 4.14–5.8)
SODIUM SERPL-SCNC: 134 MMOL/L (ref 136–145)
WBC NRBC COR # BLD AUTO: 9.96 10*3/MM3 (ref 3.4–10.8)

## 2024-04-08 PROCEDURE — 71045 X-RAY EXAM CHEST 1 VIEW: CPT

## 2024-04-08 PROCEDURE — 83605 ASSAY OF LACTIC ACID: CPT

## 2024-04-08 PROCEDURE — 99285 EMERGENCY DEPT VISIT HI MDM: CPT

## 2024-04-08 PROCEDURE — 80053 COMPREHEN METABOLIC PANEL: CPT

## 2024-04-08 PROCEDURE — 36415 COLL VENOUS BLD VENIPUNCTURE: CPT

## 2024-04-08 PROCEDURE — 85025 COMPLETE CBC W/AUTO DIFF WBC: CPT

## 2024-04-08 PROCEDURE — 70450 CT HEAD/BRAIN W/O DYE: CPT

## 2024-04-08 PROCEDURE — 0202U NFCT DS 22 TRGT SARS-COV-2: CPT | Performed by: PHYSICIAN ASSISTANT

## 2024-04-08 PROCEDURE — 87040 BLOOD CULTURE FOR BACTERIA: CPT

## 2024-04-08 PROCEDURE — 74177 CT ABD & PELVIS W/CONTRAST: CPT

## 2024-04-08 PROCEDURE — 84145 PROCALCITONIN (PCT): CPT | Performed by: PHYSICIAN ASSISTANT

## 2024-04-08 PROCEDURE — 25510000001 IOPAMIDOL 61 % SOLUTION: Performed by: EMERGENCY MEDICINE

## 2024-04-08 RX ORDER — SODIUM CHLORIDE 0.9 % (FLUSH) 0.9 %
10 SYRINGE (ML) INJECTION AS NEEDED
Status: DISCONTINUED | OUTPATIENT
Start: 2024-04-08 | End: 2024-04-14 | Stop reason: HOSPADM

## 2024-04-08 RX ORDER — FLUCONAZOLE 2 MG/ML
400 INJECTION, SOLUTION INTRAVENOUS ONCE
Status: COMPLETED | OUTPATIENT
Start: 2024-04-09 | End: 2024-04-09

## 2024-04-08 RX ADMIN — IOPAMIDOL 85 ML: 612 INJECTION, SOLUTION INTRAVENOUS at 22:50

## 2024-04-08 NOTE — TELEPHONE ENCOUNTER
Mabel Mcfarlane called and stated that PT is developing an infection around the PD cath. I don't have any available days for you until the end of the month. Would you like me to work him in? Or would you be okay if he saw a PA?

## 2024-04-08 NOTE — TELEPHONE ENCOUNTER
Caller: Marcus Wang    Relationship: Emergency Contact    Best call back number: [phone]     What is the medical concern/diagnosis: SLEEP CONCERNS -     What specialty or service is being requested: SLEEP STUDY    Any additional details: PREFER [facility] SLEEP STUDY -  MARCUS REPORTS PATIENT IS HAVING SLEEP DISTURBANCES, AND APNEA EPISODES

## 2024-04-08 NOTE — TELEPHONE ENCOUNTER
He is set to come in on 04/09. The nurse was asking if there would be a way for him to still be able to receive dialysis on Wednesday 4/10. Told her that I wasn't sure but I would ask.

## 2024-04-09 ENCOUNTER — APPOINTMENT (OUTPATIENT)
Dept: GENERAL RADIOLOGY | Facility: HOSPITAL | Age: 79
DRG: 907 | End: 2024-04-09
Payer: MEDICARE

## 2024-04-09 ENCOUNTER — ANESTHESIA (OUTPATIENT)
Dept: PERIOP | Facility: HOSPITAL | Age: 79
End: 2024-04-09
Payer: MEDICARE

## 2024-04-09 ENCOUNTER — ANESTHESIA EVENT (OUTPATIENT)
Dept: PERIOP | Facility: HOSPITAL | Age: 79
End: 2024-04-09
Payer: MEDICARE

## 2024-04-09 PROBLEM — Z99.2 ESRD ON PERITONEAL DIALYSIS: Status: ACTIVE | Noted: 2024-04-08

## 2024-04-09 PROBLEM — E11.9 TYPE 2 DIABETES MELLITUS, WITH LONG-TERM CURRENT USE OF INSULIN: Status: ACTIVE | Noted: 2024-04-09

## 2024-04-09 PROBLEM — K65.2 SPONTANEOUS BACTERIAL PERITONITIS: Status: RESOLVED | Noted: 2024-04-09 | Resolved: 2024-04-09

## 2024-04-09 PROBLEM — Z99.2 PERITONEAL DIALYSIS CATHETER IN PLACE: Status: RESOLVED | Noted: 2023-03-27 | Resolved: 2024-04-09

## 2024-04-09 PROBLEM — D84.821 IMMUNOSUPPRESSION DUE TO DRUG THERAPY: Status: ACTIVE | Noted: 2024-04-09

## 2024-04-09 PROBLEM — K21.9 GERD (GASTROESOPHAGEAL REFLUX DISEASE): Status: ACTIVE | Noted: 2024-04-09

## 2024-04-09 PROBLEM — J96.11 CHRONIC RESPIRATORY FAILURE WITH HYPOXIA: Status: ACTIVE | Noted: 2024-04-09

## 2024-04-09 PROBLEM — Z79.4 TYPE 2 DIABETES MELLITUS, WITH LONG-TERM CURRENT USE OF INSULIN: Status: ACTIVE | Noted: 2024-04-09

## 2024-04-09 PROBLEM — Z79.899 IMMUNOSUPPRESSION DUE TO DRUG THERAPY: Status: ACTIVE | Noted: 2024-04-09

## 2024-04-09 PROBLEM — K65.2 SPONTANEOUS BACTERIAL PERITONITIS: Status: ACTIVE | Noted: 2024-04-09

## 2024-04-09 PROBLEM — F31.9 BIPOLAR DISORDER: Status: ACTIVE | Noted: 2024-04-09

## 2024-04-09 PROBLEM — N18.6 ESRD ON PERITONEAL DIALYSIS: Status: ACTIVE | Noted: 2024-04-08

## 2024-04-09 PROBLEM — A41.9 SEPSIS: Status: ACTIVE | Noted: 2024-04-09

## 2024-04-09 LAB
ALBUMIN SERPL-MCNC: 2.4 G/DL (ref 3.5–5.2)
ANION GAP SERPL CALCULATED.3IONS-SCNC: 13 MMOL/L (ref 5–15)
APPEARANCE FLD: ABNORMAL
B PARAPERT DNA SPEC QL NAA+PROBE: NOT DETECTED
B PERT DNA SPEC QL NAA+PROBE: NOT DETECTED
BACTERIA UR QL AUTO: NORMAL /HPF
BILIRUB UR QL STRIP: NEGATIVE
BUN SERPL-MCNC: 42 MG/DL (ref 8–23)
BUN/CREAT SERPL: 6.3 (ref 7–25)
C PNEUM DNA NPH QL NAA+NON-PROBE: NOT DETECTED
CALCIUM SPEC-SCNC: 7.4 MG/DL (ref 8.6–10.5)
CHLORIDE SERPL-SCNC: 100 MMOL/L (ref 98–107)
CLARITY UR: CLEAR
CO2 SERPL-SCNC: 22 MMOL/L (ref 22–29)
COLOR FLD: YELLOW
COLOR UR: YELLOW
CREAT SERPL-MCNC: 6.66 MG/DL (ref 0.76–1.27)
DEPRECATED RDW RBC AUTO: 50 FL (ref 37–54)
EGFRCR SERPLBLD CKD-EPI 2021: 7.9 ML/MIN/1.73
EOSINOPHIL NFR FLD MANUAL: 5 %
ERYTHROCYTE [DISTWIDTH] IN BLOOD BY AUTOMATED COUNT: 14.4 % (ref 12.3–15.4)
FLUAV SUBTYP SPEC NAA+PROBE: NOT DETECTED
FLUBV RNA ISLT QL NAA+PROBE: NOT DETECTED
GLUCOSE BLDC GLUCOMTR-MCNC: 84 MG/DL (ref 70–130)
GLUCOSE BLDC GLUCOMTR-MCNC: 88 MG/DL (ref 70–130)
GLUCOSE BLDC GLUCOMTR-MCNC: 91 MG/DL (ref 70–130)
GLUCOSE SERPL-MCNC: 81 MG/DL (ref 65–99)
GLUCOSE UR STRIP-MCNC: NEGATIVE MG/DL
HADV DNA SPEC NAA+PROBE: NOT DETECTED
HCOV 229E RNA SPEC QL NAA+PROBE: NOT DETECTED
HCOV HKU1 RNA SPEC QL NAA+PROBE: NOT DETECTED
HCOV NL63 RNA SPEC QL NAA+PROBE: NOT DETECTED
HCOV OC43 RNA SPEC QL NAA+PROBE: NOT DETECTED
HCT VFR BLD AUTO: 25.8 % (ref 37.5–51)
HGB BLD-MCNC: 8.1 G/DL (ref 13–17.7)
HGB UR QL STRIP.AUTO: ABNORMAL
HMPV RNA NPH QL NAA+NON-PROBE: NOT DETECTED
HPIV1 RNA ISLT QL NAA+PROBE: NOT DETECTED
HPIV2 RNA SPEC QL NAA+PROBE: NOT DETECTED
HPIV3 RNA NPH QL NAA+PROBE: NOT DETECTED
HPIV4 P GENE NPH QL NAA+PROBE: NOT DETECTED
HYALINE CASTS UR QL AUTO: NORMAL /LPF
KETONES UR QL STRIP: NEGATIVE
LEUKOCYTE ESTERASE UR QL STRIP.AUTO: NEGATIVE
LYMPHOCYTES NFR FLD MANUAL: 23 %
M PNEUMO IGG SER IA-ACNC: NOT DETECTED
MCH RBC QN AUTO: 30 PG (ref 26.6–33)
MCHC RBC AUTO-ENTMCNC: 31.4 G/DL (ref 31.5–35.7)
MCV RBC AUTO: 95.6 FL (ref 79–97)
METHOD: ABNORMAL
MONOCYTES NFR FLD: 3 %
NEUTROPHILS NFR FLD MANUAL: 69 %
NITRITE UR QL STRIP: NEGATIVE
NRBC FLD-RTO: 2 /100 WBCS
NUC CELL # FLD: 6425 /MM3
PH UR STRIP.AUTO: 8 [PH] (ref 5–8)
PHOSPHATE SERPL-MCNC: 4.1 MG/DL (ref 2.5–4.5)
PLATELET # BLD AUTO: 144 10*3/MM3 (ref 140–450)
PMV BLD AUTO: 11.1 FL (ref 6–12)
POTASSIUM SERPL-SCNC: 3.9 MMOL/L (ref 3.5–5.2)
PROT UR QL STRIP: ABNORMAL
RBC # BLD AUTO: 2.7 10*6/MM3 (ref 4.14–5.8)
RBC # FLD AUTO: 798 /MM3
RBC # UR STRIP: NORMAL /HPF
REF LAB TEST METHOD: NORMAL
RHINOVIRUS RNA SPEC NAA+PROBE: NOT DETECTED
RSV RNA NPH QL NAA+NON-PROBE: NOT DETECTED
SARS-COV-2 RNA NPH QL NAA+NON-PROBE: NOT DETECTED
SODIUM SERPL-SCNC: 135 MMOL/L (ref 136–145)
SP GR UR STRIP: 1.01 (ref 1–1.03)
SQUAMOUS #/AREA URNS HPF: NORMAL /HPF
UROBILINOGEN UR QL STRIP: ABNORMAL
VANCOMYCIN SERPL-MCNC: 31.1 MCG/ML (ref 5–40)
WBC # UR STRIP: NORMAL /HPF
WBC NRBC COR # BLD AUTO: 8.08 10*3/MM3 (ref 3.4–10.8)

## 2024-04-09 PROCEDURE — 87205 SMEAR GRAM STAIN: CPT | Performed by: HOSPITALIST

## 2024-04-09 PROCEDURE — 05HP33Z INSERTION OF INFUSION DEVICE INTO RIGHT EXTERNAL JUGULAR VEIN, PERCUTANEOUS APPROACH: ICD-10-PCS | Performed by: SURGERY

## 2024-04-09 PROCEDURE — 25010000002 VANCOMYCIN 1 G RECONSTITUTED SOLUTION 1 EACH VIAL: Performed by: PHYSICIAN ASSISTANT

## 2024-04-09 PROCEDURE — 49422 REMOVE TUNNELED IP CATH: CPT | Performed by: SURGERY

## 2024-04-09 PROCEDURE — 94761 N-INVAS EAR/PLS OXIMETRY MLT: CPT

## 2024-04-09 PROCEDURE — 0WPG03Z REMOVAL OF INFUSION DEVICE FROM PERITONEAL CAVITY, OPEN APPROACH: ICD-10-PCS | Performed by: SURGERY

## 2024-04-09 PROCEDURE — 87102 FUNGUS ISOLATION CULTURE: CPT | Performed by: HOSPITALIST

## 2024-04-09 PROCEDURE — 3E1M39Z IRRIGATION OF PERITONEAL CAVITY USING DIALYSATE, PERCUTANEOUS APPROACH: ICD-10-PCS | Performed by: SURGERY

## 2024-04-09 PROCEDURE — 25010000002 CEFEPIME PER 500 MG: Performed by: PHYSICIAN ASSISTANT

## 2024-04-09 PROCEDURE — C1752 CATH,HEMODIALYSIS,SHORT-TERM: HCPCS | Performed by: SURGERY

## 2024-04-09 PROCEDURE — 25010000002 LORAZEPAM PER 2 MG

## 2024-04-09 PROCEDURE — 99223 1ST HOSP IP/OBS HIGH 75: CPT | Performed by: INTERNAL MEDICINE

## 2024-04-09 PROCEDURE — 94640 AIRWAY INHALATION TREATMENT: CPT

## 2024-04-09 PROCEDURE — 25010000002 MICAFUNGIN SODIUM 100 MG RECONSTITUTED SOLUTION 1 EACH VIAL: Performed by: SURGERY

## 2024-04-09 PROCEDURE — 89051 BODY FLUID CELL COUNT: CPT | Performed by: HOSPITALIST

## 2024-04-09 PROCEDURE — 71045 X-RAY EXAM CHEST 1 VIEW: CPT

## 2024-04-09 PROCEDURE — 36556 INSERT NON-TUNNEL CV CATH: CPT | Performed by: SURGERY

## 2024-04-09 PROCEDURE — 87015 SPECIMEN INFECT AGNT CONCNTJ: CPT | Performed by: HOSPITALIST

## 2024-04-09 PROCEDURE — 99222 1ST HOSP IP/OBS MODERATE 55: CPT | Performed by: SURGERY

## 2024-04-09 PROCEDURE — 94760 N-INVAS EAR/PLS OXIMETRY 1: CPT

## 2024-04-09 PROCEDURE — 87070 CULTURE OTHR SPECIMN AEROBIC: CPT | Performed by: HOSPITALIST

## 2024-04-09 PROCEDURE — 94664 DEMO&/EVAL PT USE INHALER: CPT

## 2024-04-09 PROCEDURE — 80202 ASSAY OF VANCOMYCIN: CPT | Performed by: NURSE PRACTITIONER

## 2024-04-09 PROCEDURE — 85027 COMPLETE CBC AUTOMATED: CPT | Performed by: NURSE PRACTITIONER

## 2024-04-09 PROCEDURE — 81001 URINALYSIS AUTO W/SCOPE: CPT | Performed by: PHYSICIAN ASSISTANT

## 2024-04-09 PROCEDURE — 25010000002 OLANZAPINE 10 MG RECONSTITUTED SOLUTION: Performed by: NURSE PRACTITIONER

## 2024-04-09 PROCEDURE — 94799 UNLISTED PULMONARY SVC/PX: CPT

## 2024-04-09 PROCEDURE — 25010000002 PHENYLEPHRINE 10 MG/ML SOLUTION: Performed by: NURSE ANESTHETIST, CERTIFIED REGISTERED

## 2024-04-09 PROCEDURE — 25010000002 PROPOFOL 10 MG/ML EMULSION: Performed by: NURSE ANESTHETIST, CERTIFIED REGISTERED

## 2024-04-09 PROCEDURE — 25010000002 FLUCONAZOLE PER 200 MG: Performed by: PHYSICIAN ASSISTANT

## 2024-04-09 PROCEDURE — 87077 CULTURE AEROBIC IDENTIFY: CPT | Performed by: HOSPITALIST

## 2024-04-09 PROCEDURE — 80069 RENAL FUNCTION PANEL: CPT | Performed by: NURSE PRACTITIONER

## 2024-04-09 PROCEDURE — 25010000002 HEPARIN (PORCINE) PER 1000 UNITS: Performed by: SURGERY

## 2024-04-09 PROCEDURE — 76000 FLUOROSCOPY <1 HR PHYS/QHP: CPT

## 2024-04-09 PROCEDURE — 76937 US GUIDE VASCULAR ACCESS: CPT | Performed by: SURGERY

## 2024-04-09 PROCEDURE — 25010000002 LIDOCAINE 1 % SOLUTION: Performed by: SURGERY

## 2024-04-09 PROCEDURE — 25810000003 SODIUM CHLORIDE 0.9 % SOLUTION 250 ML FLEX CONT: Performed by: PHYSICIAN ASSISTANT

## 2024-04-09 PROCEDURE — 87186 SC STD MICRODIL/AGAR DIL: CPT | Performed by: HOSPITALIST

## 2024-04-09 PROCEDURE — 99223 1ST HOSP IP/OBS HIGH 75: CPT | Performed by: STUDENT IN AN ORGANIZED HEALTH CARE EDUCATION/TRAINING PROGRAM

## 2024-04-09 PROCEDURE — 82948 REAGENT STRIP/BLOOD GLUCOSE: CPT

## 2024-04-09 PROCEDURE — 77001 FLUOROGUIDE FOR VEIN DEVICE: CPT | Performed by: SURGERY

## 2024-04-09 DEVICE — ACUTE TRIPLE LUMEN CATHETER TRAY STRAIGHT EXTENSIONS WITH IC COMPONENTS
Type: IMPLANTABLE DEVICE | Site: SUBCLAVIAN | Status: FUNCTIONAL
Brand: MAHURKAR ELITE

## 2024-04-09 RX ORDER — LIDOCAINE HYDROCHLORIDE 20 MG/ML
INJECTION, SOLUTION INFILTRATION; PERINEURAL AS NEEDED
Status: DISCONTINUED | OUTPATIENT
Start: 2024-04-09 | End: 2024-04-09 | Stop reason: SURG

## 2024-04-09 RX ORDER — ARIPIPRAZOLE 2 MG/1
2 TABLET ORAL 2 TIMES DAILY
Status: DISCONTINUED | OUTPATIENT
Start: 2024-04-09 | End: 2024-04-14 | Stop reason: HOSPADM

## 2024-04-09 RX ORDER — PROMETHAZINE HYDROCHLORIDE 25 MG/1
25 TABLET ORAL ONCE AS NEEDED
Status: DISCONTINUED | OUTPATIENT
Start: 2024-04-09 | End: 2024-04-09 | Stop reason: HOSPADM

## 2024-04-09 RX ORDER — DROPERIDOL 2.5 MG/ML
0.62 INJECTION, SOLUTION INTRAMUSCULAR; INTRAVENOUS
Status: DISCONTINUED | OUTPATIENT
Start: 2024-04-09 | End: 2024-04-09 | Stop reason: HOSPADM

## 2024-04-09 RX ORDER — SODIUM CHLORIDE 0.9 % (FLUSH) 0.9 %
3-10 SYRINGE (ML) INJECTION AS NEEDED
Status: DISCONTINUED | OUTPATIENT
Start: 2024-04-09 | End: 2024-04-09 | Stop reason: HOSPADM

## 2024-04-09 RX ORDER — PROMETHAZINE HYDROCHLORIDE 25 MG/1
25 SUPPOSITORY RECTAL ONCE AS NEEDED
Status: DISCONTINUED | OUTPATIENT
Start: 2024-04-09 | End: 2024-04-09 | Stop reason: HOSPADM

## 2024-04-09 RX ORDER — PANTOPRAZOLE SODIUM 40 MG/1
40 TABLET, DELAYED RELEASE ORAL
Status: DISCONTINUED | OUTPATIENT
Start: 2024-04-09 | End: 2024-04-14 | Stop reason: HOSPADM

## 2024-04-09 RX ORDER — PHENYLEPHRINE HYDROCHLORIDE 10 MG/ML
INJECTION INTRAVENOUS AS NEEDED
Status: DISCONTINUED | OUTPATIENT
Start: 2024-04-09 | End: 2024-04-09 | Stop reason: SURG

## 2024-04-09 RX ORDER — LORAZEPAM 2 MG/ML
INJECTION INTRAMUSCULAR
Status: COMPLETED
Start: 2024-04-09 | End: 2024-04-09

## 2024-04-09 RX ORDER — BISACODYL 5 MG/1
5 TABLET, DELAYED RELEASE ORAL DAILY PRN
Status: DISCONTINUED | OUTPATIENT
Start: 2024-04-09 | End: 2024-04-14 | Stop reason: HOSPADM

## 2024-04-09 RX ORDER — FLUCONAZOLE 200 MG/1
200 TABLET ORAL EVERY 24 HOURS
Status: DISCONTINUED | OUTPATIENT
Start: 2024-04-09 | End: 2024-04-09

## 2024-04-09 RX ORDER — SODIUM CHLORIDE 9 MG/ML
INJECTION, SOLUTION INTRAVENOUS CONTINUOUS PRN
Status: DISCONTINUED | OUTPATIENT
Start: 2024-04-09 | End: 2024-04-09 | Stop reason: SURG

## 2024-04-09 RX ORDER — CARVEDILOL 6.25 MG/1
6.25 TABLET ORAL NIGHTLY
Status: DISCONTINUED | OUTPATIENT
Start: 2024-04-09 | End: 2024-04-14 | Stop reason: HOSPADM

## 2024-04-09 RX ORDER — CYCLOBENZAPRINE HCL 10 MG
5 TABLET ORAL 3 TIMES DAILY PRN
Status: DISCONTINUED | OUTPATIENT
Start: 2024-04-09 | End: 2024-04-09

## 2024-04-09 RX ORDER — POLYETHYLENE GLYCOL 3350 17 G/17G
17 POWDER, FOR SOLUTION ORAL DAILY PRN
Status: DISCONTINUED | OUTPATIENT
Start: 2024-04-09 | End: 2024-04-14 | Stop reason: HOSPADM

## 2024-04-09 RX ORDER — FLUMAZENIL 0.1 MG/ML
0.2 INJECTION INTRAVENOUS AS NEEDED
Status: DISCONTINUED | OUTPATIENT
Start: 2024-04-09 | End: 2024-04-09 | Stop reason: HOSPADM

## 2024-04-09 RX ORDER — LEVOTHYROXINE SODIUM 0.15 MG/1
150 TABLET ORAL
Status: DISCONTINUED | OUTPATIENT
Start: 2024-04-09 | End: 2024-04-14 | Stop reason: HOSPADM

## 2024-04-09 RX ORDER — AMOXICILLIN 250 MG
2 CAPSULE ORAL 2 TIMES DAILY PRN
Status: DISCONTINUED | OUTPATIENT
Start: 2024-04-09 | End: 2024-04-14 | Stop reason: HOSPADM

## 2024-04-09 RX ORDER — HYDROCODONE BITARTRATE AND ACETAMINOPHEN 7.5; 325 MG/1; MG/1
1 TABLET ORAL EVERY 4 HOURS PRN
Status: DISCONTINUED | OUTPATIENT
Start: 2024-04-09 | End: 2024-04-09 | Stop reason: HOSPADM

## 2024-04-09 RX ORDER — NITROGLYCERIN 0.4 MG/1
0.4 TABLET SUBLINGUAL
Status: DISCONTINUED | OUTPATIENT
Start: 2024-04-09 | End: 2024-04-14 | Stop reason: HOSPADM

## 2024-04-09 RX ORDER — SODIUM CHLORIDE 0.9 % (FLUSH) 0.9 %
3 SYRINGE (ML) INJECTION EVERY 12 HOURS SCHEDULED
Status: DISCONTINUED | OUTPATIENT
Start: 2024-04-09 | End: 2024-04-09 | Stop reason: HOSPADM

## 2024-04-09 RX ORDER — ONDANSETRON 4 MG/1
4 TABLET, ORALLY DISINTEGRATING ORAL EVERY 6 HOURS PRN
Status: DISCONTINUED | OUTPATIENT
Start: 2024-04-09 | End: 2024-04-14 | Stop reason: HOSPADM

## 2024-04-09 RX ORDER — SODIUM CHLORIDE, SODIUM LACTATE, CALCIUM CHLORIDE, MAGNESIUM CHLORIDE AND DEXTROSE 1.5; 538; 448; 18.3; 5.08 G/100ML; MG/100ML; MG/100ML; MG/100ML; MG/100ML
2000 INJECTION, SOLUTION INTRAPERITONEAL
Status: DISCONTINUED | OUTPATIENT
Start: 2024-04-09 | End: 2024-04-09

## 2024-04-09 RX ORDER — PROPOFOL 10 MG/ML
VIAL (ML) INTRAVENOUS AS NEEDED
Status: DISCONTINUED | OUTPATIENT
Start: 2024-04-09 | End: 2024-04-09 | Stop reason: SURG

## 2024-04-09 RX ORDER — ONDANSETRON 2 MG/ML
4 INJECTION INTRAMUSCULAR; INTRAVENOUS EVERY 6 HOURS PRN
Status: DISCONTINUED | OUTPATIENT
Start: 2024-04-09 | End: 2024-04-14 | Stop reason: HOSPADM

## 2024-04-09 RX ORDER — FENTANYL CITRATE 50 UG/ML
50 INJECTION, SOLUTION INTRAMUSCULAR; INTRAVENOUS ONCE AS NEEDED
Status: DISCONTINUED | OUTPATIENT
Start: 2024-04-09 | End: 2024-04-09 | Stop reason: HOSPADM

## 2024-04-09 RX ORDER — ALUMINA, MAGNESIA, AND SIMETHICONE 2400; 2400; 240 MG/30ML; MG/30ML; MG/30ML
15 SUSPENSION ORAL EVERY 6 HOURS PRN
Status: DISCONTINUED | OUTPATIENT
Start: 2024-04-09 | End: 2024-04-09

## 2024-04-09 RX ORDER — DEXTROSE MONOHYDRATE 25 G/50ML
25 INJECTION, SOLUTION INTRAVENOUS
Status: DISCONTINUED | OUTPATIENT
Start: 2024-04-09 | End: 2024-04-14 | Stop reason: HOSPADM

## 2024-04-09 RX ORDER — EPHEDRINE SULFATE 50 MG/ML
5 INJECTION, SOLUTION INTRAVENOUS ONCE AS NEEDED
Status: DISCONTINUED | OUTPATIENT
Start: 2024-04-09 | End: 2024-04-09 | Stop reason: HOSPADM

## 2024-04-09 RX ORDER — DIPHENHYDRAMINE HYDROCHLORIDE 50 MG/ML
12.5 INJECTION INTRAMUSCULAR; INTRAVENOUS
Status: DISCONTINUED | OUTPATIENT
Start: 2024-04-09 | End: 2024-04-09 | Stop reason: HOSPADM

## 2024-04-09 RX ORDER — FENTANYL CITRATE 50 UG/ML
25 INJECTION, SOLUTION INTRAMUSCULAR; INTRAVENOUS
Status: DISCONTINUED | OUTPATIENT
Start: 2024-04-09 | End: 2024-04-09 | Stop reason: HOSPADM

## 2024-04-09 RX ORDER — HYDROCODONE BITARTRATE AND ACETAMINOPHEN 5; 325 MG/1; MG/1
1 TABLET ORAL ONCE AS NEEDED
Status: DISCONTINUED | OUTPATIENT
Start: 2024-04-09 | End: 2024-04-09 | Stop reason: HOSPADM

## 2024-04-09 RX ORDER — MIDAZOLAM HYDROCHLORIDE 1 MG/ML
0.5 INJECTION INTRAMUSCULAR; INTRAVENOUS
Status: DISCONTINUED | OUTPATIENT
Start: 2024-04-09 | End: 2024-04-09 | Stop reason: HOSPADM

## 2024-04-09 RX ORDER — HEPARIN SODIUM 1000 [USP'U]/ML
INJECTION, SOLUTION INTRAVENOUS; SUBCUTANEOUS AS NEEDED
Status: DISCONTINUED | OUTPATIENT
Start: 2024-04-09 | End: 2024-04-09 | Stop reason: HOSPADM

## 2024-04-09 RX ORDER — ARFORMOTEROL TARTRATE 15 UG/2ML
15 SOLUTION RESPIRATORY (INHALATION)
Status: DISCONTINUED | OUTPATIENT
Start: 2024-04-09 | End: 2024-04-14 | Stop reason: HOSPADM

## 2024-04-09 RX ORDER — SODIUM CHLORIDE 0.9 % (FLUSH) 0.9 %
10 SYRINGE (ML) INJECTION AS NEEDED
Status: DISCONTINUED | OUTPATIENT
Start: 2024-04-09 | End: 2024-04-14 | Stop reason: HOSPADM

## 2024-04-09 RX ORDER — SEVELAMER CARBONATE 800 MG/1
800 TABLET, FILM COATED ORAL 2 TIMES DAILY WITH MEALS
Status: DISCONTINUED | OUTPATIENT
Start: 2024-04-09 | End: 2024-04-09

## 2024-04-09 RX ORDER — LIDOCAINE HYDROCHLORIDE 10 MG/ML
0.5 INJECTION, SOLUTION INFILTRATION; PERINEURAL ONCE AS NEEDED
Status: DISCONTINUED | OUTPATIENT
Start: 2024-04-09 | End: 2024-04-09 | Stop reason: HOSPADM

## 2024-04-09 RX ORDER — ATORVASTATIN CALCIUM 20 MG/1
40 TABLET, FILM COATED ORAL DAILY
Status: DISCONTINUED | OUTPATIENT
Start: 2024-04-09 | End: 2024-04-14 | Stop reason: HOSPADM

## 2024-04-09 RX ORDER — NALOXONE HCL 0.4 MG/ML
0.2 VIAL (ML) INJECTION AS NEEDED
Status: DISCONTINUED | OUTPATIENT
Start: 2024-04-09 | End: 2024-04-09 | Stop reason: HOSPADM

## 2024-04-09 RX ORDER — ONDANSETRON 2 MG/ML
4 INJECTION INTRAMUSCULAR; INTRAVENOUS ONCE AS NEEDED
Status: DISCONTINUED | OUTPATIENT
Start: 2024-04-09 | End: 2024-04-09 | Stop reason: HOSPADM

## 2024-04-09 RX ORDER — BISACODYL 10 MG
10 SUPPOSITORY, RECTAL RECTAL DAILY PRN
Status: DISCONTINUED | OUTPATIENT
Start: 2024-04-09 | End: 2024-04-14 | Stop reason: HOSPADM

## 2024-04-09 RX ORDER — HYDROMORPHONE HYDROCHLORIDE 1 MG/ML
0.25 INJECTION, SOLUTION INTRAMUSCULAR; INTRAVENOUS; SUBCUTANEOUS
Status: DISCONTINUED | OUTPATIENT
Start: 2024-04-09 | End: 2024-04-09 | Stop reason: HOSPADM

## 2024-04-09 RX ORDER — INSULIN LISPRO 100 [IU]/ML
2-7 INJECTION, SOLUTION INTRAVENOUS; SUBCUTANEOUS
Status: DISCONTINUED | OUTPATIENT
Start: 2024-04-09 | End: 2024-04-14 | Stop reason: HOSPADM

## 2024-04-09 RX ORDER — IPRATROPIUM BROMIDE AND ALBUTEROL SULFATE 2.5; .5 MG/3ML; MG/3ML
3 SOLUTION RESPIRATORY (INHALATION) ONCE AS NEEDED
Status: DISCONTINUED | OUTPATIENT
Start: 2024-04-09 | End: 2024-04-09 | Stop reason: HOSPADM

## 2024-04-09 RX ORDER — LORAZEPAM 2 MG/ML
1 INJECTION INTRAMUSCULAR ONCE
Status: COMPLETED | OUTPATIENT
Start: 2024-04-09 | End: 2024-04-09

## 2024-04-09 RX ORDER — UREA 10 %
3 LOTION (ML) TOPICAL NIGHTLY PRN
Status: DISCONTINUED | OUTPATIENT
Start: 2024-04-09 | End: 2024-04-14 | Stop reason: HOSPADM

## 2024-04-09 RX ORDER — IBUPROFEN 600 MG/1
1 TABLET ORAL
Status: DISCONTINUED | OUTPATIENT
Start: 2024-04-09 | End: 2024-04-14 | Stop reason: HOSPADM

## 2024-04-09 RX ORDER — TRAMADOL HYDROCHLORIDE 50 MG/1
50 TABLET ORAL EVERY 12 HOURS PRN
Status: DISCONTINUED | OUTPATIENT
Start: 2024-04-09 | End: 2024-04-14 | Stop reason: HOSPADM

## 2024-04-09 RX ORDER — LABETALOL HYDROCHLORIDE 5 MG/ML
5 INJECTION, SOLUTION INTRAVENOUS
Status: DISCONTINUED | OUTPATIENT
Start: 2024-04-09 | End: 2024-04-09 | Stop reason: HOSPADM

## 2024-04-09 RX ORDER — IPRATROPIUM BROMIDE AND ALBUTEROL SULFATE 2.5; .5 MG/3ML; MG/3ML
3 SOLUTION RESPIRATORY (INHALATION) EVERY 4 HOURS PRN
Status: DISCONTINUED | OUTPATIENT
Start: 2024-04-09 | End: 2024-04-14 | Stop reason: HOSPADM

## 2024-04-09 RX ORDER — HYDRALAZINE HYDROCHLORIDE 20 MG/ML
5 INJECTION INTRAMUSCULAR; INTRAVENOUS
Status: DISCONTINUED | OUTPATIENT
Start: 2024-04-09 | End: 2024-04-09 | Stop reason: HOSPADM

## 2024-04-09 RX ORDER — BUPIVACAINE HYDROCHLORIDE AND EPINEPHRINE 5; 5 MG/ML; UG/ML
INJECTION, SOLUTION PERINEURAL AS NEEDED
Status: DISCONTINUED | OUTPATIENT
Start: 2024-04-09 | End: 2024-04-09 | Stop reason: HOSPADM

## 2024-04-09 RX ORDER — LIDOCAINE HYDROCHLORIDE 10 MG/ML
INJECTION, SOLUTION INFILTRATION; PERINEURAL AS NEEDED
Status: DISCONTINUED | OUTPATIENT
Start: 2024-04-09 | End: 2024-04-09 | Stop reason: HOSPADM

## 2024-04-09 RX ORDER — SACCHAROMYCES BOULARDII 250 MG
250 CAPSULE ORAL 2 TIMES DAILY
Status: DISCONTINUED | OUTPATIENT
Start: 2024-04-09 | End: 2024-04-14 | Stop reason: HOSPADM

## 2024-04-09 RX ORDER — TORSEMIDE 100 MG/1
100 TABLET ORAL DAILY
Status: DISCONTINUED | OUTPATIENT
Start: 2024-04-09 | End: 2024-04-09

## 2024-04-09 RX ORDER — NICOTINE POLACRILEX 4 MG
15 LOZENGE BUCCAL
Status: DISCONTINUED | OUTPATIENT
Start: 2024-04-09 | End: 2024-04-14 | Stop reason: HOSPADM

## 2024-04-09 RX ORDER — OLANZAPINE 10 MG/2ML
5 INJECTION, POWDER, FOR SOLUTION INTRAMUSCULAR ONCE
Status: COMPLETED | OUTPATIENT
Start: 2024-04-09 | End: 2024-04-09

## 2024-04-09 RX ORDER — EPHEDRINE SULFATE 50 MG/ML
INJECTION, SOLUTION INTRAVENOUS AS NEEDED
Status: DISCONTINUED | OUTPATIENT
Start: 2024-04-09 | End: 2024-04-09 | Stop reason: SURG

## 2024-04-09 RX ORDER — ACETAMINOPHEN 325 MG/1
650 TABLET ORAL EVERY 4 HOURS PRN
Status: DISCONTINUED | OUTPATIENT
Start: 2024-04-09 | End: 2024-04-14 | Stop reason: HOSPADM

## 2024-04-09 RX ADMIN — ACETAMINOPHEN 325MG 650 MG: 325 TABLET ORAL at 07:40

## 2024-04-09 RX ADMIN — EPHEDRINE SULFATE 10 MG: 50 INJECTION INTRAVENOUS at 16:50

## 2024-04-09 RX ADMIN — ATORVASTATIN CALCIUM 40 MG: 20 TABLET, FILM COATED ORAL at 11:29

## 2024-04-09 RX ADMIN — PANTOPRAZOLE SODIUM 40 MG: 40 TABLET, DELAYED RELEASE ORAL at 11:30

## 2024-04-09 RX ADMIN — FLUCONAZOLE IN SODIUM CHLORIDE 400 MG: 2 INJECTION, SOLUTION INTRAVENOUS at 00:59

## 2024-04-09 RX ADMIN — CEFEPIME 1000 MG: 1 INJECTION, POWDER, FOR SOLUTION INTRAMUSCULAR; INTRAVENOUS at 00:10

## 2024-04-09 RX ADMIN — PHENYLEPHRINE HYDROCHLORIDE 100 MCG: 10 INJECTION INTRAVENOUS at 16:39

## 2024-04-09 RX ADMIN — EPHEDRINE SULFATE 10 MG: 50 INJECTION INTRAVENOUS at 16:43

## 2024-04-09 RX ADMIN — PHENYLEPHRINE HYDROCHLORIDE 100 MCG: 10 INJECTION INTRAVENOUS at 16:55

## 2024-04-09 RX ADMIN — LORAZEPAM 1 MG: 2 INJECTION INTRAMUSCULAR; INTRAVENOUS at 20:59

## 2024-04-09 RX ADMIN — PHENYLEPHRINE HYDROCHLORIDE 150 MCG: 10 INJECTION INTRAVENOUS at 16:52

## 2024-04-09 RX ADMIN — LORAZEPAM 1 MG: 2 INJECTION INTRAMUSCULAR at 20:59

## 2024-04-09 RX ADMIN — ARIPIPRAZOLE 2 MG: 2 TABLET ORAL at 11:30

## 2024-04-09 RX ADMIN — PROPOFOL 50 MG: 10 INJECTION, EMULSION INTRAVENOUS at 16:33

## 2024-04-09 RX ADMIN — SEVELAMER CARBONATE 800 MG: 800 TABLET, FILM COATED ORAL at 11:29

## 2024-04-09 RX ADMIN — LEVOTHYROXINE SODIUM 150 MCG: 150 TABLET ORAL at 11:29

## 2024-04-09 RX ADMIN — PHENYLEPHRINE HYDROCHLORIDE 150 MCG: 10 INJECTION INTRAVENOUS at 16:44

## 2024-04-09 RX ADMIN — EPHEDRINE SULFATE 10 MG: 50 INJECTION INTRAVENOUS at 16:44

## 2024-04-09 RX ADMIN — MICAFUNGIN 100 MG: 20 INJECTION, POWDER, LYOPHILIZED, FOR SOLUTION INTRAVENOUS at 22:31

## 2024-04-09 RX ADMIN — ARFORMOTEROL TARTRATE 15 MCG: 15 SOLUTION RESPIRATORY (INHALATION) at 10:23

## 2024-04-09 RX ADMIN — PHENYLEPHRINE HYDROCHLORIDE 200 MCG: 10 INJECTION INTRAVENOUS at 17:11

## 2024-04-09 RX ADMIN — PROPOFOL 140 MCG/KG/MIN: 10 INJECTION, EMULSION INTRAVENOUS at 16:34

## 2024-04-09 RX ADMIN — OLANZAPINE 5 MG: 10 INJECTION, POWDER, FOR SOLUTION INTRAMUSCULAR at 05:48

## 2024-04-09 RX ADMIN — Medication 250 MG: at 11:30

## 2024-04-09 RX ADMIN — PHENYLEPHRINE HYDROCHLORIDE 200 MCG: 10 INJECTION INTRAVENOUS at 17:02

## 2024-04-09 RX ADMIN — SODIUM CHLORIDE: 9 INJECTION, SOLUTION INTRAVENOUS at 16:22

## 2024-04-09 RX ADMIN — PHENYLEPHRINE HYDROCHLORIDE 200 MCG: 10 INJECTION INTRAVENOUS at 16:58

## 2024-04-09 RX ADMIN — LIDOCAINE HYDROCHLORIDE 50 MG: 20 INJECTION, SOLUTION INFILTRATION; PERINEURAL at 16:33

## 2024-04-09 RX ADMIN — VANCOMYCIN HYDROCHLORIDE 1000 MG: 1 INJECTION, POWDER, LYOPHILIZED, FOR SOLUTION INTRAVENOUS at 00:27

## 2024-04-09 RX ADMIN — PHENYLEPHRINE HYDROCHLORIDE 100 MCG: 10 INJECTION INTRAVENOUS at 16:47

## 2024-04-09 RX ADMIN — TORSEMIDE 100 MG: 100 TABLET ORAL at 11:29

## 2024-04-09 NOTE — ED PROVIDER NOTES
MD ATTESTATION NOTE    The MARIANELA and I have discussed this patient's history, physical exam, MDM, and treatment plan.  I have reviewed the documentation and personally had a face to face interaction with the patient. I affirm the documentation and agree with the treatment and plan.  The attached note describes my personal findings.      I provided a substantive portion of the medical decision making.        Brief HPI: Patient presents for evaluation of acute fever and hallucinations.  He reportedly had his PD fluid checked 3 days ago and they were notified today that it came back positive for yeast.  He was supposed to see Dr. Berry tomorrow to have his PD catheter removed and his nephrologist also ordered antibiotics he had his PD catheter that been administered earlier today.  Patient states that he has some mild upper abdominal pain currently.    PHYSICAL EXAM  ED Triage Vitals   Temp Heart Rate Resp BP SpO2   04/08/24 2128 04/08/24 2128 04/08/24 2128 04/08/24 2138 04/08/24 2128   (!) 101.4 °F (38.6 °C) 99 18 92/49 90 %      Temp src Heart Rate Source Patient Position BP Location FiO2 (%)   04/08/24 2128 04/08/24 2128 04/08/24 2138 04/08/24 2128 --   Tympanic Monitor Sitting Right arm          GENERAL: Awake and alert, no acute distress.  HENT: nares patent  EYES: no scleral icterus  CV: regular rhythm, normal rate  RESPIRATORY: normal effort  ABDOMEN: soft, mild epigastric tenderness, abdomen nondistended, no rebound or guarding, PD catheter in place.  MUSCULOSKELETAL: no deformity  NEURO: alert, moves all extremities, follows commands  PSYCH:  calm, cooperative  SKIN: warm, dry, hypopigmentation of the face    Vital signs and nursing notes reviewed.        Medical Decision Making:  ED Course as of 04/09/24 0233   Mon Apr 08, 2024 2304 Per my independent interpretation of the chest x-ray, there is no obvious pneumothorax. [DC]   8801 I discussed the case with MD Evelyn with Nephology at this time regarding  the patient.  I discussed work-up, results, concerns.  I discussed the consulting provider's desire for adding cefepime, vancomycin, and diflucan and agrees to consult in the hospital tomorrow.    [DC]   Tue Apr 09, 2024 0023 Discussed with BECKY Waters for A, who agrees admit of behalf of Dr. Coleman. [JR]   0023 I reviewed patient's CT abdomen report and I have independently interpreted the images as well.  There are some small foci of pneumoperitoneum which I suspect are secondary to the PD catheter.  He has a benign abdominal exam with only some mild epigastric tenderness, no rebound or guarding to suggest peritonitis.  I have low suspicion for perforated viscus however I will discuss with general surgery. [JR]   0055 I discussed the CT findings with Dr. Brooke, general surgery.  Also explained patient's clinical presentation, physical exam, and laboratory findings.  She reviewed the imaging and also compared it to his recent CT abdomen from March 24.  She states that he had some pneumoperitoneum prior deviously as well.  She will evaluate the patient request to remain n.p.o. but she did not think that the CT findings today were consistent with ischemic bowel or bowel perforation. [JR]      ED Course User Index  [DC] Winston Pimentel PA  [JR] Lorne Carmona MD       45 minutes of critical care provided. This time excludes other billable procedures. Time does include preparation of documents, medical consultations, review of old records, and direct bedside care. Patient is at high risk for life-threatening deterioration due to sepsis.       Final diagnoses:   Sepsis, due to unspecified organism, unspecified whether acute organ dysfunction present   SBP (spontaneous bacterial peritonitis)   Fungal infection   ESRD on peritoneal dialysis       Admit    SHARED VISIT: This visit was performed by BOTH a physician and an APC. The substantive portion of the medical decision making was performed by  this attesting physician who made or approved the management plan and takes responsibility for patient management. All studies in the APC note (if performed) were independently interpreted by me.      Lorne Carmona MD  04/09/24 0822

## 2024-04-09 NOTE — ANESTHESIA POSTPROCEDURE EVALUATION
"Patient: Nelson Wang    Procedure Summary       Date: 04/09/24 Room / Location: [facility] OR [address] [place] [address] MAIN OR    Anesthesia Start: 1624 Anesthesia Stop: 1739    Procedures:       REMOVAL PERITONEAL DIALYSIS CATHETER (Abdomen)      HEMODIALYSIS CATHETER INSERTION (Left) Diagnosis:       Sepsis, due to unspecified organism, unspecified whether acute organ dysfunction present      ESRD on peritoneal dialysis      (Sepsis, due to unspecified organism, unspecified whether acute organ dysfunction present [A41.9])      (ESRD on peritoneal dialysis [N18.6, Z99.2])    Surgeons: Jose Berry MD Provider: Enrique Borrero MD    Anesthesia Type: MAC ASA Status: 3            Anesthesia Type: MAC    Vitals  Vitals Value Taken Time   /62 04/09/24 1800   Temp 36.9 °C (98.5 °F) 04/09/24 1741   Pulse 81 04/09/24 1812   Resp 16 04/09/24 1800   SpO2 97 % 04/09/24 1812   Vitals shown include unfiled device data.        Post Anesthesia Care and Evaluation    Patient location during evaluation: PACU  Level of consciousness: awake  Pain management: adequate    Airway patency: patent  Anesthetic complications: No anesthetic complications  PONV Status: controlled  Cardiovascular status: acceptable  Respiratory status: acceptable  Hydration status: acceptable    Comments: /62   Pulse 85   Temp 36.9 °C (98.5 °F) (Oral)   Resp 16   Ht 170.2 cm (67\")   Wt 78 kg (171 lb 15.3 oz)   SpO2 100%   BMI 26.93 kg/m²       "

## 2024-04-09 NOTE — ED NOTES
RN attempted to place oxygen on patient. Pt refused, Rn told pt his sats were at 86% and he said that was good and denied placing a NC at this time. RN paged ([place]) at this time.

## 2024-04-09 NOTE — PROGRESS NOTES
Patient with peritoneal dialysis catheter infection, likely fungal.  Abdominal exam is benign.  Discussed with the patient about further step.  Recommend that he undergoes peritoneal dialysis catheter removal under moderate sedation.  Will coordinate with vascular surgery about dialysis catheter placement.

## 2024-04-09 NOTE — CONSULTS
[facility] Consult Note      Patient Name: Nelson Wang  : 1945  MRN: [id number]  Primary Care Physician:  Janna Mo MD  Referring Physician: Lorne Carmona MD  Date of admission: 2024    Subjective     Reason for Consult:   ESRD     HPI:   Nelson Wang is a 79 y.o. male known to have history of end-stage renal disease on peritoneal dialysis followed by Dr. Castro who had a recent admission to the hospital February for septic shock related to E. coli bacteremia/peritonitis/C. difficile colitis patient was readmitted to the hospital in March for volume overload and pneumonia that was treated also with antibiotic therapy who presented to the hospital confusion and abdominal pain.  Patient noted history of abdominal pain for approximately a month.  Patient noted to have cloudy was started on intraperitoneal antibiotic.  Culture grew yeast awaiting identification the patient was referred to the hospital for management.  Currently doing well.  Continues to complain of low-grade fever.  Denies abdominal pain.  No nausea no vomiting.      Review of Systems:   14 point review of systems is otherwise negative except for mentioned above on HPI    Personal History     Past Medical History:   Diagnosis Date    Anemia     Ankle pain, right     CKD (chronic kidney disease), stage IV     Diabetes     Gout     High cholesterol     HL (hearing loss)     Hyperkalemia     Hypertension     Hypothyroidism     Psoriasis     Vitiligo        Past Surgical History:   Procedure Laterality Date    ANKLE SURGERY  1990    APPENDECTOMY N/A     BRONCHOSCOPY N/A 3/1/2024    Procedure: BRONCHOSCOPY WITH BAL AND WASHINGS;  Surgeon: Sandra Espinal MD;  Location: [facility] ENDOSCOPY;  Service: Pulmonary;  Laterality: N/A;  PNEUMONIA    COLONOSCOPY N/A 2022    [facility]    HIP BIPOLAR REPLACEMENT Right 2000    INSERTION PERITONEAL DIALYSIS CATHETER N/A 3/27/2023    Procedure:  LAPAROSCOPIC INSERTION PERITONEAL DIALYSIS CATHETER, LAPAROSCOPIC OMENTOPEXY WITH LYSIS OF ADHESIONS;  Surgeon: Jose Berry MD;  Location: [facility] MAIN OR;  Service: General;  Laterality: N/A;    INSERTION PERITONEAL DIALYSIS CATHETER Left 7/23/2023    Procedure: REVISION OF PERITONEAL DIALYSIS CATHETER;  Surgeon: Radha Oreilly MD;  Location: [facility] MAIN OR;  Service: General;  Laterality: Left;    RENAL BIOPSY Left 07/15/2022       Family History: family history includes Cancer (age of onset: 35) in his sister; Diabetes in his brother, mother, and sister; Heart disease in his father and paternal uncle.    Social History:  reports that he quit smoking about 24 years ago. His smoking use included cigarettes. He started smoking about 34 years ago. He has a 10 pack-year smoking history. He has been exposed to tobacco smoke. He has never used smokeless tobacco. He reports current alcohol use. He reports that he does not use drugs.    Home Medications:  Prior to Admission medications    Medication Sig Start Date End Date Taking? Authorizing Provider   arformoterol (BROVANA) 15 MCG/2ML nebulizer solution Take 2 mL by nebulization 2 (Two) Times a Day for 30 days. Indications: Chronic Obstructive Lung Disease, J 44.9 3/22/24 4/21/24 Yes Janna Mo MD   ARIPiprazole (ABILIFY) 2 MG tablet Take 2 tablets by mouth Daily.  Patient taking differently: Take 1 tablet by mouth 2 (Two) Times a Day. 3/28/24  Yes Janna Mo MD   atorvastatin (LIPITOR) 40 MG tablet Take 1 tablet by mouth Daily for 30 days. 3/22/24 4/21/24 Yes Janna Mo MD   Beclomethasone Diprop HFA (QVAR Redihaler) 40 MCG/ACT inhaler Inhale 2 puffs 2 (Two) Times a Day. 3/28/24  Yes Janna Mo MD   carvedilol (COREG) 6.25 MG tablet Take 1 tablet by mouth 2 (Two) Times a Day for 30 days.  Patient taking differently: Take 1 tablet by mouth Daily. 3/22/24 4/21/24 Yes Janna Mo MD   gentamicin (GARAMYCIN) 0.1  % cream Apply 1 Application topically to the appropriate area as directed Daily. 3/10/24  Yes Saúl Vargas MD   Insulin Glargine (LANTUS SOLOSTAR) 100 UNIT/ML injection pen Inject 20 Units under the skin into the appropriate area as directed Every Night. 4/4/24  Yes Janna Mo MD   ipratropium-albuterol (DUO-NEB) 0.5-2.5 mg/3 ml nebulizer Take 3 mL by nebulization Every 4 (Four) Hours As Needed for Wheezing or Shortness of Air. Indications: Chronic Obstructive Lung Disease, J44.9 3/22/24  Yes Janna Mo MD   levothyroxine (SYNTHROID, LEVOTHROID) 150 MCG tablet Take 1 tablet by mouth Every Morning for 30 days. 3/22/24 4/21/24 Yes Janna Mo MD   pantoprazole (PROTONIX) 40 MG EC tablet Take 1 tablet by mouth Every Morning for 30 days. 3/22/24 4/21/24 Yes Janna Mo MD   sevelamer (RENVELA) 800 MG tablet Take 1 tablet by mouth 2 (Two) Times a Day With Meals for 30 days. 3/22/24 4/21/24 Yes Janna Mo MD   torsemide (DEMADEX) 100 MG tablet Take 1 tablet by mouth Daily for 30 days. 3/22/24 4/21/24 Yes Janna Mo MD   cyclobenzaprine (FLEXERIL) 5 MG tablet Take 1 tablet by mouth 3 (Three) Times a Day As Needed for Muscle Spasms. 3/22/24   Janna Mo MD   Risankizumab-rzaa (SKYRIZI, 150 MG DOSE, SC) Inject  under the skin into the appropriate area as directed. Once every 3 months    Provider, Historical, MD   saccharomyces boulardii (FLORASTOR) 250 MG capsule Take 1 capsule by mouth 2 (Two) Times a Day for 30 days. 3/9/24 4/8/24  Saúl Vargas MD       Allergies:  No Known Allergies    Objective     Vitals:   Temp:  [98.3 °F (36.8 °C)-101.4 °F (38.6 °C)] 98.3 °F (36.8 °C)  Heart Rate:  [82-99] 84  Resp:  [17-18] 17  BP: ()/(46-87) 119/57  Flow (L/min):  [2] 2    Intake/Output Summary (Last 24 hours) at 4/9/2024 1003  Last data filed at 4/9/2024 0352  Gross per 24 hour   Intake 300 ml   Output --   Net 300 ml       Physical Exam:    Constitutional: Pleasant not in distress,  confused  HEENT: Sclera anicteric, no conjunctival injection  Neck: Supple, no thyromegaly, no lymphadenopathy, trachea at midline, no JVD  Respiratory: Clear to auscultation bilaterally, nonlabored respiration  Cardiovascular: RRR, no murmurs, no rubs or gallops, no carotid bruit  Gastrointestinal: Positive bowel sounds, abdomen is soft, nontender and nondistended  : No palpable bladder  Musculoskeletal: No edema, no clubbing or cyanosis  Psychiatric: Appropriate affect, cooperative  Neurologic: Oriented x3, moving all extremities, normal speech and mental status  Skin: Warm and dry       Scheduled Meds:     arformoterol, 15 mcg, Nebulization, BID - RT  ARIPiprazole, 2 mg, Oral, BID  atorvastatin, 40 mg, Oral, Daily  carvedilol, 6.25 mg, Oral, Nightly  [START ON 4/10/2024] cefepime, 2,000 mg, Intravenous, Q24H  fluconazole, 200 mg, Oral, Q24H  insulin glargine, 10 Units, Subcutaneous, Nightly  insulin lispro, 2-7 Units, Subcutaneous, 4x Daily AC & at Bedtime  levothyroxine, 150 mcg, Oral, Q AM  pantoprazole, 40 mg, Oral, Q AM  saccharomyces boulardii, 250 mg, Oral, BID  sevelamer, 800 mg, Oral, BID With Meals  torsemide, 100 mg, Oral, Daily  Vancomycin Pharmacy Intermittent/Pulse Dosing, , Does not apply, Daily      IV Meds:   Pharmacy to dose vancomycin,         Results Reviewed:   I have personally reviewed the results from the time of this admission to 4/9/2024 10:03 EDT     Lab Results   Component Value Date    GLUCOSE 81 04/09/2024    CALCIUM 7.4 (L) 04/09/2024     (L) 04/09/2024    K 3.9 04/09/2024    CO2 22.0 04/09/2024     04/09/2024    BUN 42 (H) 04/09/2024    CREATININE 6.66 (H) 04/09/2024    EGFRIFAFRI 27 (L) 03/24/2022    EGFRIFNONA 28 (L) 02/23/2022    BCR 6.3 (L) 04/09/2024    ANIONGAP 13.0 04/09/2024      Lab Results   Component Value Date    MG 1.4 (L) 03/24/2024    PHOS 4.1 04/09/2024    ALBUMIN 2.4 (L) 04/09/2024           Assessment /  Plan     ASSESSMENT:  End-stage renal disease on peritoneal dialysis now complicated with peritoneal dialysis related peritonitis with initial culture showing yeast.  Currently on IV vancomycin, cefepime and Diflucan.  Failed outpatient intraperitoneal antibiotics  Peritoneal dialysis related peritonitis with culture from peritoneal fluid reported to be positive for yeast   Hypertension with CKD   Type 2 diabetes mellitus with CKD   Hyperphosphatemia on binders  History of hypomagnesemia  Sepsis secondary to peritoneal dialysis peritonitis      PLAN:  Will continue PD treatment with 4 cycles of 2 L of 1.5% given soft blood pressure  Appreciate general surgery and ID recommendation will likely need his PD catheter to be removed given persistence of infection despite 3 days of antibiotic therapy and possible yeast on patient fluid culture.  If this is confirmed in the peritoneal fluid patient may need to be transitioned to intermittent HD.  Will await for final patient on fluid culture  Agree with blood cultures x 2   Labs in AM       Thank you for involving us in the care of Nelson MEAGAN Moncadael.  Please feel free to call with any questions.    Dayne Rivas MD  04/09/24  10:03 EDT    [facility]  [phone]    Parts of this note may be an electronic transcription/translation of spoken language to printed text using the Dragon dictation system.

## 2024-04-09 NOTE — OP NOTE
Operative Note  Location: [facility]  Date of Admission:  4/8/2024  OR Date: 4/9/2024    Pre-op Diagnosis: Peritonitis and end-stage renal disease    Post-op Diagnosis: Same    Procedure:   1) ultrasound-guided vascular access  2) fluoroscopic guidance for catheter tip placement  3) nontunneled right internal jugular central venous hemodialysis catheter placement    Surgeon: Yunier Lane MD    Assistant: None    Anesthesia: Monitored Anesthesia Care    Staff:   Circulator: Mojgan Senior RN; Rocío Freeman RN  Radiology Technologist: Jayden Johnson  Scrermelinda Person: Karlee Tiwari    Estimated Blood Loss: minimal    Specimen: * No orders in the log *    Complications: None    Findings: Both ports aspirated and flushed easily    Implants: Nothing was implanted during the procedure    Indications:    The patient is an 79 y.o. male seen for evaluation with end-stage renal disease and peritonitis.  Because of his infection, we are going to place a temporary nontunneled catheter at the time of his PD catheter removal       Procedure:    The patient was taken to the operating placed supine on the operating table.  After induction of IV sedation the right neck was prepped and draped with ChloraPrep.  Timeout was observed.  Under ultrasound, the right internal jugular vein was evaluated and then accessed under direct guidance after local anesthetic was used.  The wire was observed to move down into the right atrium without difficulty.  Serial dilatation of the tract was done and then a 16 cm dialysis catheter was placed without difficulty.  Under for scopic guidance.  The catheter tip was placed into the right atrium.  It flushed and aspirated easily.  It was locked with concentrated heparin and secured to the neck with sutures.  Sterile dressing was applied.  Lung fields were examined.  No evidence of a hemothorax or pneumothorax.      Active Hospital Problems    Diagnosis  POA    **Spontaneous  bacterial peritonitis [K65.2]  Yes    Sepsis [A41.9]  Yes    Type 2 diabetes mellitus, with long-term current use of insulin [E11.9, Z79.4]  Not Applicable    GERD (gastroesophageal reflux disease) [K21.9]  Yes    Immunosuppression due to drug therapy [D84.821, Z79.899]  Not Applicable    Bipolar disorder [F31.9]  Yes    Chronic respiratory failure with hypoxia [J96.11]  Yes    ESRD on peritoneal dialysis [N18.6, Z99.2]  Not Applicable    End stage renal disease [N18.6]  Yes    Peritoneal dialysis catheter in place [Z99.2]  Not Applicable    Essential hypertension [I10]  Yes    Anemia due to chronic kidney disease [N18.9, D63.1]  Yes    Psoriasis [L40.9]  Yes    Monoclonal gammopathy of unknown significance (MGUS) [D47.2]  Yes    Hypothyroidism [E03.9]  Yes      Resolved Hospital Problems   No resolved problems to display.      Yunier Lane MD     Date: 4/9/2024  Time: 17:04 EDT

## 2024-04-09 NOTE — CONSULTS
Name: Nelson Wang ADMIT: 2024   : 1945  PCP: Janna Mo MD    MRN: [id number] LOS: 0 days   AGE/SEX: 79 y.o. male  ROOM: [facility] OR/MAIN OR     Consults  CC: Fever  Subjective     History of Present Illness  79 y.o. male with a history of end-stage renal disease on peritoneal dialysis who was admitted for bacterial peritonitis related to his catheter.  For this reason, we were asked to evaluate him for hemodialysis access.      Review of Systems    History Review Reviewed Comments   Past Medical History:  [x]  ESRD, vitiligo, hearing loss, diabetes   Past Surgical History: [x]  No prior vascular surgery   Family History: [x]  Diabetes and heart disease   Social History: [x]  Former smoker     Scheduled Meds:     [Transfer Hold] arformoterol, 15 mcg, Nebulization, BID - RT  [Transfer Hold] ARIPiprazole, 2 mg, Oral, BID  [Transfer Hold] atorvastatin, 40 mg, Oral, Daily  carvedilol, 6.25 mg, Oral, Nightly  [Transfer Hold] insulin glargine, 10 Units, Subcutaneous, Nightly  [Transfer Hold] insulin lispro, 2-7 Units, Subcutaneous, 4x Daily AC & at Bedtime  [Transfer Hold] levothyroxine, 150 mcg, Oral, Q AM  micafungin (MYCAMINE) IV, 100 mg, Intravenous, Q24H  [Transfer Hold] pantoprazole, 40 mg, Oral, Q AM  [Transfer Hold] saccharomyces boulardii, 250 mg, Oral, BID  [Transfer Hold] sevelamer, 800 mg, Oral, BID With Meals  sodium chloride, 3 mL, Intravenous, Q12H  [Transfer Hold] torsemide, 100 mg, Oral, Daily  [Transfer Hold] UltraBag/Dianeal/1.5% Dextrose low-peter (hale #[id number]), 2,000 mL, Intraperitoneal, 4 Exchanges Daily - Dwell Overnight      IV Meds:        Allergies: Patient has no known allergies.    Objective   Temp:  [97.6 °F (36.4 °C)-101.4 °F (38.6 °C)] 98.6 °F (37 °C)  Heart Rate:  [77-99] 87  Resp:  [16-18] 16  BP: ()/(46-87) 128/56    No intake/output data recorded.    Physical Exam  Patient is conversant and able to answer questions.  He appears chronically ill  and overall frail.  He is right-handed.  No pacemakers or defibrillators.    Data Reviewed:  CBC    Results from last 7 days   Lab Units 04/09/24  0653 04/08/24  2140   WBC 10*3/mm3 8.08 9.96   HEMOGLOBIN g/dL 8.1* 8.6*   PLATELETS 10*3/mm3 144 128*     BMP   Results from last 7 days   Lab Units 04/09/24  0653 04/08/24  2140   SODIUM mmol/L 135* 134*   POTASSIUM mmol/L 3.9 4.2   CHLORIDE mmol/L 100 95*   CO2 mmol/L 22.0 24.3   BUN mg/dL 42* 46*   CREATININE mg/dL 6.66* 6.31*   GLUCOSE mg/dL 81 122*   PHOSPHORUS mg/dL 4.1  --      HbA1C   Lab Results   Component Value Date    HGBA1C 6.2 (H) 04/05/2024    HGBA1C 6.70 (H) 03/22/2024    HGBA1C 6.00 (H) 12/06/2023     Infection   Results from last 7 days   Lab Units 04/08/24  2140   PROCALCITONIN ng/mL 1.25*     Radiology(recent) CT Abdomen Pelvis With Contrast    Addendum Date: 4/9/2024    ADDENDUM: 04 09 24 03:03 Call From [facility]  on 04 09 02:53 (-04:00) Electronically signed by Glenda Payton DOomatchetan [employer] of     Addendum Date: 4/9/2024    ADDENDUM: 04 09 24 00:31 Call Doctor Regarding Above results, called LILIA Azul on 04 09 00:29 (-04:00) Electronically signed by Glenda Payton DOomatchetan [employer] of     Result Date: 4/9/2024  Communications:  Call Doctor Above results Electronically signed by Glenda Payton DOomate [employer] on 04-09-24 at 0011    CT Head Without Contrast    Result Date: 4/8/2024  Electronically signed by Celia Hatfield MD on 04-08-24 at 2336    XR Chest 1 View    Result Date: 4/8/2024  No interval change or evidence for active disease in the chest.  This report was finalized on 4/8/2024 11:00 PM by Dr. Margarito Kincaid M.D on Workstation: [id number]       Labs significant for: Hemoglobin 8.2.  Potassium 3.9    Imaging Studies:  No pacemakers or defibrillators.  Right hemidiaphragm is a bit elevated.    Active Hospital Problems    Diagnosis  POA    **Spontaneous bacterial  peritonitis [K65.2]  Yes    Sepsis [A41.9]  Yes    Type 2 diabetes mellitus, with long-term current use of insulin [E11.9, Z79.4]  Not Applicable    GERD (gastroesophageal reflux disease) [K21.9]  Yes    Immunosuppression due to drug therapy [D84.821, Z79.899]  Not Applicable    Bipolar disorder [F31.9]  Yes    Chronic respiratory failure with hypoxia [J96.11]  Yes    ESRD on peritoneal dialysis [N18.6, Z99.2]  Not Applicable    End stage renal disease [N18.6]  Yes    Peritoneal dialysis catheter in place [Z99.2]  Not Applicable    Essential hypertension [I10]  Yes    Anemia due to chronic kidney disease [N18.9, D63.1]  Yes    Psoriasis [L40.9]  Yes    Monoclonal gammopathy of unknown significance (MGUS) [D47.2]  Yes    Hypothyroidism [E03.9]  Yes      Resolved Hospital Problems   No resolved problems to display.       Data Points:  During this visit the following were done:  Labs Reviewed [x]  []  []  Labs Ordered []  []  []  Radiology Reports Reviewed [x]    Radiology Ordered []    EKG, echo, and/or stress test reviewed []    Vascular lab results reviewed  []    Vascular lab images reviewed and interpreted per myself   []    Referring Provider Records Reviewed []    ER Records Reviewed []    Hospital Records Reviewed/Summarized [x]    History Obtained From Family []    Radiological images view and Interpreted per myself [x]    Case Discussed with referring provider []     Decision to obtain and request outside records  []        Active Hospital Problems:   Active Hospital Problems    Diagnosis  POA    **Spontaneous bacterial peritonitis [K65.2]  Yes    Sepsis [A41.9]  Yes    Type 2 diabetes mellitus, with long-term current use of insulin [E11.9, Z79.4]  Not Applicable    GERD (gastroesophageal reflux disease) [K21.9]  Yes    Immunosuppression due to drug therapy [D84.821, Z79.899]  Not Applicable    Bipolar disorder [F31.9]  Yes    Chronic respiratory failure with hypoxia [J96.11]  Yes    ESRD on peritoneal  dialysis [N18.6, Z99.2]  Not Applicable    End stage renal disease [N18.6]  Yes    Peritoneal dialysis catheter in place [Z99.2]  Not Applicable    Essential hypertension [I10]  Yes    Anemia due to chronic kidney disease [N18.9, D63.1]  Yes    Psoriasis [L40.9]  Yes    Monoclonal gammopathy of unknown significance (MGUS) [D47.2]  Yes    Hypothyroidism [E03.9]  Yes      Resolved Hospital Problems   No resolved problems to display.      Assessment & Plan     Assessment / Plan     79 y.o. male with end-stage renal disease on peritoneal dialysis, hypertension, diabetes, and vitiligo who was admitted to the intensive care unit with fever, abdominal pain, and some encephalopathy.  He had previously been hospitalized at [facility] and found to have E. coli bacteremia thought to be related to pneumonia versus peritonitis.  Plans are to remove his PD catheter in the operating room today.  For this reason, I was asked to see him regarding placement of a temporary dialysis catheter.    Will place a nontunneled hemodialysis catheter in the operating room today with the expectation that he will need to come back for a tunneled catheter once this is considered an acceptable risk by infectious disease.  Risk of vascular injury, pneumothorax, infection, and catheter dysfunction discussed with the patient.  He agrees to proceed.    Please call my office with any question: [phone]

## 2024-04-09 NOTE — CONSULTS
Referring Provider: Lorne Carmona MD      Subjective   History of present illness: This is 79-year-old with history of end-stage renal disease on peritoneal dialysis, hypertension, diabetes mellitus, psoriasis on Risankizumab (Skyrizi, IL-23 inhibitor).  He was hospitalized in February 2024, per review the past medical records, at [facility] and treated for E. coli bacteremia thought to be related to pneumonia versus peritonitis, C. difficile colitis (PCR positive, antigen negative) and possible acute peritonitis.  He was discharged on ceftriaxone IV and vancomycin orally.  He was readmitted in March with volume overload and concern for repeat aspiration.  On review of his imaging from those admissions he definitely had a right lower lobe pneumonia but no CT evidence of peritonitis or colitis.    Apparently he has had about a months of ongoing fevers and abdominal pain and peritoneal fluid was taken as an outpatient which was concerning for peritonitis.  Reportedly, cultures grew fungus    Past Medical History:   Diagnosis Date    Anemia     Ankle pain, right     CKD (chronic kidney disease), stage IV     Diabetes     Gout     High cholesterol     HL (hearing loss)     Hyperkalemia     Hypertension     Hypothyroidism     Psoriasis     Vitiligo        Past Surgical History:   Procedure Laterality Date    ANKLE SURGERY  11/1990    APPENDECTOMY N/A 1954    BRONCHOSCOPY N/A 3/1/2024    Procedure: BRONCHOSCOPY WITH BAL AND WASHINGS;  Surgeon: Sandra Espinal MD;  Location: [facility] ENDOSCOPY;  Service: Pulmonary;  Laterality: N/A;  PNEUMONIA    COLONOSCOPY N/A 06/2022    Psychiatric    HIP BIPOLAR REPLACEMENT Right 01/2000    INSERTION PERITONEAL DIALYSIS CATHETER N/A 3/27/2023    Procedure: LAPAROSCOPIC INSERTION PERITONEAL DIALYSIS CATHETER, LAPAROSCOPIC OMENTOPEXY WITH LYSIS OF ADHESIONS;  Surgeon: Jose Berry MD;  Location: [facility] OR;  Service: General;  Laterality: N/A;    INSERTION  PERITONEAL DIALYSIS CATHETER Left 7/23/2023    Procedure: REVISION OF PERITONEAL DIALYSIS CATHETER;  Surgeon: Radha Oreilly MD;  Location: [facility] OR;  Service: General;  Laterality: Left;    RENAL BIOPSY Left 07/15/2022           No Known Allergies    Medication:  Antibiotics:  Anti-Infectives (From admission, onward)      Ordered     Dose/Rate Route Frequency Start Stop    04/09/24 0157  cefepime 2000 mg IVPB in 100 mL NS (MBP)        Ordering Provider: Germania Chow APRN    2,000 mg  over 30 Minutes Intravenous Every 24 Hours 04/10/24 0000 04/14/24 2359    04/09/24 0208  Vancomycin Pharmacy Intermittent/Pulse Dosing        Ordering Provider: Germania Chow APRN     Does not apply Daily 04/09/24 0900 04/14/24 0859    04/09/24 0157  Pharmacy to dose vancomycin        Ordering Provider: Germania Chow APRN     Does not apply Continuous PRN 04/09/24 0156 04/14/24 0155    04/08/24 2345  vancomycin (VANCOCIN) 1,000 mg in sodium chloride 0.9 % 250 mL IVPB-VTB        Ordering Provider: Winston Pimentel PA    1,000 mg  250 mL/hr over 60 Minutes Intravenous Once 04/09/24 0000 04/09/24 0157    04/08/24 2345  cefepime 1000 mg IVPB in 100 mL NS (MBP)        Ordering Provider: Winston Pimentel PA    1,000 mg  over 30 Minutes Intravenous Once 04/09/24 0000 04/09/24 0052    04/08/24 2345  fluconazole (DIFLUCAN) IVPB 400 mg        Ordering Provider: Winston Pimentel PA    400 mg  100 mL/hr over 120 Minutes Intravenous Once 04/09/24 0000 04/09/24 0352                Physical Exam:   Vital Signs   Temp:  [98.3 °F (36.8 °C)-101.4 °F (38.6 °C)] 98.3 °F (36.8 °C)  Heart Rate:  [82-99] 85  Resp:  [17-18] 17  BP: ()/(46-87) 99/46    GENERAL: Awake and alert, sickly  HEENT: Oropharynx is clear. Hearing is grossly normal.   EYES: . No conjunctival injection. No lid lag.   LUNGS:normal respiratory effort.   SKIN: no cutaneous eruptions in exposed areas  Mildly ttp in rlq, distended    Results  Review:  White count 8.08, hemoglobin 8.1, platelets 144  Creatinine 6.66  Liver function test normal        Microbiology:  Blood cultures pending  Respiratory pathogen panel pending  4/5 peritoneal fluid with 1745 white blood cells   Radiology:  CT abdomen pelvis with mesenteric edema and extensive mesenteric portal mesenteric gas  CT head, no acute intracranial abnormalities   CXR, independently interpreted: no acute pna    A/p  1.  Sepsis/PD catheter associated peritonitis  2.  End-stage renal disease  3.  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  Recent E. coli bacteremia from pneumonia treated at [facility]   5. Immunosuprpession    He is currently on vancomycin, cefepime and fluconazole for which we will continue.  See if we can get the peritoneal dialysis fluid culture results.  If this confirms fungi that I think we should go ahead and stop the antibacterials and just continue with the fungal monotherapy.  If indeed fungal peritonitis this usually necessitates catheter removal.     ADDENDUM: cx with c parapsilosis. Will stop vanc, cefepime and fluconazole. Start micafungin. Plan step down therapy to voriconazole (no fluconazole sensitivity available) to complete 2-4 weeks when more stable    Thank you for this consult.  We will continue to follow along and tailor antibiotics as the patient's clinical course evolves.

## 2024-04-09 NOTE — ANESTHESIA PREPROCEDURE EVALUATION
Anesthesia Evaluation     Patient summary reviewed and Nursing notes reviewed   NPO Solid Status: > 8 hours  NPO Liquid Status: > 2 hours           Airway   Mallampati: II  TM distance: >3 FB  Neck ROM: full  No difficulty expected  Comment: Grade I view with Myrick 2  Dental    (+) poor dentition    Pulmonary - normal exam    breath sounds clear to auscultation  (+) pneumonia , a smoker Former,    ROS comment: Hx aspiration/hx acute on chronic respiratory failure with hypoxia  Cardiovascular - normal exam    ECG reviewed  Rhythm: regular  Rate: normal    (+) hypertension less than 2 medications, hyperlipidemia    ROS comment: EF 57%, mild AS by ECHO 2/24    Neuro/Psych  (+) psychiatric history Bipolar  GI/Hepatic/Renal/Endo    (+) GERD, renal disease- ESRD and dialysis, diabetes mellitus type 2 using insulin, thyroid problem     Musculoskeletal     Abdominal  - normal exam   Substance History      OB/GYN          Other   blood dyscrasia anemia,       Other Comment: Hgb 8.1                Anesthesia Plan    ASA 3     MAC     (Propofol drip)  intravenous induction     Anesthetic plan, risks, benefits, and alternatives have been provided, discussed and informed consent has been obtained with: patient.      CODE STATUS:    Code Status (Patient has no pulse and is not breathing): CPR (Attempt to Resuscitate)  Medical Interventions (Patient has pulse or is breathing): Full Support

## 2024-04-09 NOTE — ED PROVIDER NOTES
EMERGENCY DEPARTMENT ENCOUNTER      Room Number:  RA [facility] OR/MAIN OR  PCP: Janna Mo MD  Independent Historians: Family  Patient Care Team:  Janna Mo MD as PCP - General (Internal Medicine)  Josephine Warren APRN as Nurse Practitioner (Pulmonary Disease)       HPI:  Chief Complaint: Fever    A complete HPI/ROS/PMH/PSH/SH/FH are unobtainable due to: Altered Mental Status    Chronic or social conditions impacting patient care (Social Determinants of Health): None      Context: Nelson Wang is a 79 y.o. male with a PMH significant for hypertension, type 2 diabetes, ESRD on peritoneal dialysis, aspiration pneumonia who presents to the ED c/o acute fever and hallucinations.  The daughter at the bedside is giving the entirety of the history and states that the patient has been found to have yeast infection in his peritoneal dialysis fluid.  The patient has been treated in recent weeks for aspiration pneumonia at an outlying ER during admission.  His mental status was declining in the hospital despite improving with his clinical findings and he was discharged home.  His daughter has been helping take care of him since being home and has continued antibiotics.  The patient had fluid drawn from his PD catheter 3 days ago and today they found out that the culture grew out yeast.  Dr. Berry with general surgery was planning for removal of the catheter tomorrow but the patient spiked a fever at home and reported to his daughter that he was having hallucinations.  The daughter states that intermittently he was seeing people in his house that were not there today and yesterday.      Upon review of prior external notes (non-ED) -and- Review of prior external test results outside of this encounter it appears the patient was evaluated in the office with internal medicine for chronic low back pain on 3/22/2024.  The patient had a normal uric acid on 3/24/2024 and a normal lactic acid at that time.      PAST  MEDICAL HISTORY  Active Ambulatory Problems     Diagnosis Date Noted    Essential hypertension 08/25/2021    Bradycardia, sinus 08/25/2021    Anemia due to chronic kidney disease 08/05/2021    Hypothyroidism 02/09/2016    Idiopathic gout 07/22/2019    Proteinuria 02/09/2016    Psoriasis 07/07/2021    Thrombocytopenia 09/04/2018    Type 2 diabetes mellitus without complication 02/09/2016    CKD (chronic kidney disease), stage IV 08/25/2021    Hyperlipidemia 08/25/2021    Monoclonal gammopathy of unknown significance (MGUS) 09/04/2018    Stage 5 chronic kidney disease 01/26/2023    Peritoneal dialysis catheter in place 03/27/2023    Chronic gout without tophus 06/06/2023    Peritoneal dialysis catheter dysfunction 07/22/2023    Medicare annual wellness visit, subsequent 12/08/2023    Chronic cough 12/08/2023    Peritonitis 02/16/2024    HCAP (healthcare-associated pneumonia) 02/29/2024    Acute on chronic respiratory failure with hypoxia 02/29/2024    End stage renal disease 04/27/2023    Aspiration pneumonitis 02/28/2024     Resolved Ambulatory Problems     Diagnosis Date Noted    CKD (chronic kidney disease) stage 4, GFR 15-29 ml/min 08/25/2021    Hyperlipidemia LDL goal <70 08/25/2021    MGUS (monoclonal gammopathy of unknown significance) 09/04/2018    Stage 3a chronic kidney disease 07/22/2019    Essential (primary) hypertension 02/09/2016    Monoclonal gammopathy of unknown significance (MGUS) 09/04/2018    Hyperlipidemia 08/25/2021    Essential hypertension 02/09/2016    Anemia in chronic kidney disease 08/05/2021    Stage 5 chronic kidney disease 12/01/2022    Essential hypertension 02/09/2016     Past Medical History:   Diagnosis Date    Anemia     Ankle pain, right     Diabetes     Gout     High cholesterol     HL (hearing loss)     Hyperkalemia     Hypertension     Vitiligo          PAST SURGICAL HISTORY  Past Surgical History:   Procedure Laterality Date    ANKLE SURGERY  11/1990    APPENDECTOMY N/A 1954     BRONCHOSCOPY N/A 3/1/2024    Procedure: BRONCHOSCOPY WITH BAL AND WASHINGS;  Surgeon: Sandra Espinal MD;  Location: [facility] ENDOSCOPY;  Service: Pulmonary;  Laterality: N/A;  PNEUMONIA    COLONOSCOPY N/A 2022    [facility]    HIP BIPOLAR REPLACEMENT Right 2000    INSERTION PERITONEAL DIALYSIS CATHETER N/A 3/27/2023    Procedure: LAPAROSCOPIC INSERTION PERITONEAL DIALYSIS CATHETER, LAPAROSCOPIC OMENTOPEXY WITH LYSIS OF ADHESIONS;  Surgeon: Jose Berry MD;  Location: [facility] MAIN OR;  Service: General;  Laterality: N/A;    INSERTION PERITONEAL DIALYSIS CATHETER Left 2023    Procedure: REVISION OF PERITONEAL DIALYSIS CATHETER;  Surgeon: Radha Oreilly MD;  Location: [facility] OR;  Service: General;  Laterality: Left;    RENAL BIOPSY Left 07/15/2022         FAMILY HISTORY  Family History   Problem Relation Age of Onset    Diabetes Mother     Heart disease Father     Cancer Sister 35    Diabetes Sister     Diabetes Brother     Heart disease Paternal Uncle     Malig Hyperthermia Neg Hx          SOCIAL HISTORY  Social History     Socioeconomic History    Marital status:    Tobacco Use    Smoking status: Former     Current packs/day: 0.00     Average packs/day: 1 pack/day for 10.0 years (10.0 ttl pk-yrs)     Types: Cigarettes     Start date:      Quit date: 2000     Years since quittin.2     Passive exposure: Past    Smokeless tobacco: Never    Tobacco comments:     quit 25 years ago, chews nicotine gum   Vaping Use    Vaping status: Never Used   Substance and Sexual Activity    Alcohol use: Yes     Comment: 1 DRINK/DAY    Drug use: Never    Sexual activity: Defer         ALLERGIES  Patient has no known allergies.      REVIEW OF SYSTEMS  Included in HPI  All systems reviewed and negative except for those discussed in HPI.      PHYSICAL EXAM    I have reviewed the triage vital signs and nursing notes.    ED Triage Vitals   Temp Heart Rate Resp BP SpO2   24 2128  04/08/24 2128 04/08/24 2128 04/08/24 2138 04/08/24 2128   (!) 101.4 °F (38.6 °C) 99 18 92/49 90 %      Temp src Heart Rate Source Patient Position BP Location FiO2 (%)   04/08/24 2128 04/08/24 2128 04/08/24 2138 04/08/24 2128 --   Tympanic Monitor Sitting Right arm        Physical Exam  Constitutional:       General: He is not in acute distress.     Appearance: He is well-developed. He is ill-appearing.   HENT:      Head: Normocephalic and atraumatic.   Eyes:      General: No scleral icterus.     Conjunctiva/sclera: Conjunctivae normal.   Neck:      Trachea: No tracheal deviation.   Cardiovascular:      Rate and Rhythm: Regular rhythm. Tachycardia present.   Pulmonary:      Effort: Pulmonary effort is normal.      Breath sounds: Normal breath sounds.   Abdominal:      General: There is distension.      Palpations: Abdomen is soft.      Tenderness: There is no abdominal tenderness. There is no guarding.       Musculoskeletal:         General: No deformity.      Cervical back: Normal range of motion.   Lymphadenopathy:      Cervical: No cervical adenopathy.   Skin:     General: Skin is warm and dry.   Neurological:      Mental Status: He is alert.   Psychiatric:         Behavior: Behavior normal.         Cognition and Memory: Cognition is impaired. Memory is impaired.         Vital signs and nursing notes reviewed.      PPE: I wore a surgical mask throughout my encounters with the pt. I performed hand hygiene on entry into the pt room and upon exit.      LAB RESULTS  Recent Results (from the past 24 hour(s))   Comprehensive Metabolic Panel    Collection Time: 04/08/24  9:40 PM    Specimen: Blood   Result Value Ref Range    Glucose 122 (H) 65 - 99 mg/dL    BUN 46 (H) 8 - 23 mg/dL    Creatinine 6.31 (H) 0.76 - 1.27 mg/dL    Sodium 134 (L) 136 - 145 mmol/L    Potassium 4.2 3.5 - 5.2 mmol/L    Chloride 95 (L) 98 - 107 mmol/L    CO2 24.3 22.0 - 29.0 mmol/L    Calcium 8.3 (L) 8.6 - 10.5 mg/dL    Total Protein 6.0 6.0 - 8.5  g/dL    Albumin 2.8 (L) 3.5 - 5.2 g/dL    ALT (SGPT) 10 1 - 41 U/L    AST (SGOT) 19 1 - 40 U/L    Alkaline Phosphatase 55 39 - 117 U/L    Total Bilirubin 0.4 0.0 - 1.2 mg/dL    Globulin 3.2 gm/dL    A/G Ratio 0.9 g/dL    BUN/Creatinine Ratio 7.3 7.0 - 25.0    Anion Gap 14.7 5.0 - 15.0 mmol/L    eGFR 8.4 (L) >60.0 mL/min/1.73   Lactic Acid, Plasma    Collection Time: 04/08/24  9:40 PM    Specimen: Blood   Result Value Ref Range    Lactate 1.6 0.5 - 2.0 mmol/L   CBC Auto Differential    Collection Time: 04/08/24  9:40 PM    Specimen: Blood   Result Value Ref Range    WBC 9.96 3.40 - 10.80 10*3/mm3    RBC 2.94 (L) 4.14 - 5.80 10*6/mm3    Hemoglobin 8.6 (L) 13.0 - 17.7 g/dL    Hematocrit 27.5 (L) 37.5 - 51.0 %    MCV 93.5 79.0 - 97.0 fL    MCH 29.3 26.6 - 33.0 pg    MCHC 31.3 (L) 31.5 - 35.7 g/dL    RDW 14.4 12.3 - 15.4 %    RDW-SD 48.3 37.0 - 54.0 fl    MPV 10.8 6.0 - 12.0 fL    Platelets 128 (L) 140 - 450 10*3/mm3    Neutrophil % 74.4 42.7 - 76.0 %    Lymphocyte % 14.6 (L) 19.6 - 45.3 %    Monocyte % 9.3 5.0 - 12.0 %    Eosinophil % 0.8 0.3 - 6.2 %    Basophil % 0.1 0.0 - 1.5 %    Immature Grans % 0.8 (H) 0.0 - 0.5 %    Neutrophils, Absolute 7.41 (H) 1.70 - 7.00 10*3/mm3    Lymphocytes, Absolute 1.45 0.70 - 3.10 10*3/mm3    Monocytes, Absolute 0.93 (H) 0.10 - 0.90 10*3/mm3    Eosinophils, Absolute 0.08 0.00 - 0.40 10*3/mm3    Basophils, Absolute 0.01 0.00 - 0.20 10*3/mm3    Immature Grans, Absolute 0.08 (H) 0.00 - 0.05 10*3/mm3    nRBC 0.0 0.0 - 0.2 /100 WBC   Procalcitonin    Collection Time: 04/08/24  9:40 PM    Specimen: Blood   Result Value Ref Range    Procalcitonin 1.25 (H) 0.00 - 0.25 ng/mL   Respiratory Panel PCR w/COVID-19(SARS-CoV-2) RA/TANIA/GAVIN/PAD/COR/NENA In-House, NP Swab in [facility], 2 HR TAT - Swab, Nasopharynx    Collection Time: 04/08/24 11:29 PM    Specimen: Nasopharynx; Swab   Result Value Ref Range    ADENOVIRUS, PCR Not Detected Not Detected    Coronavirus 229E Not Detected Not Detected     Coronavirus HKU1 Not Detected Not Detected    Coronavirus NL63 Not Detected Not Detected    Coronavirus OC43 Not Detected Not Detected    COVID19 Not Detected Not Detected - Ref. Range    Human Metapneumovirus Not Detected Not Detected    Human Rhinovirus/Enterovirus Not Detected Not Detected    Influenza A PCR Not Detected Not Detected    Influenza B PCR Not Detected Not Detected    Parainfluenza Virus 1 Not Detected Not Detected    Parainfluenza Virus 2 Not Detected Not Detected    Parainfluenza Virus 3 Not Detected Not Detected    Parainfluenza Virus 4 Not Detected Not Detected    RSV, PCR Not Detected Not Detected    Bordetella pertussis pcr Not Detected Not Detected    Bordetella parapertussis PCR Not Detected Not Detected    Chlamydophila pneumoniae PCR Not Detected Not Detected    Mycoplasma pneumo by PCR Not Detected Not Detected   Urinalysis With Microscopic If Indicated (No Culture) - Urine, Clean Catch    Collection Time: 04/09/24  4:12 AM    Specimen: Urine, Clean Catch   Result Value Ref Range    Color, UA Yellow Yellow, Straw    Appearance, UA Clear Clear    pH, UA 8.0 5.0 - 8.0    Specific Gravity, UA 1.012 1.005 - 1.030    Glucose, UA Negative Negative    Ketones, UA Negative Negative    Bilirubin, UA Negative Negative    Blood, UA Small (1+) (A) Negative    Protein,  mg/dL (2+) (A) Negative    Leuk Esterase, UA Negative Negative    Nitrite, UA Negative Negative    Urobilinogen, UA 0.2 E.U./dL 0.2 - 1.0 E.U./dL   Urinalysis, Microscopic Only - Urine, Clean Catch    Collection Time: 04/09/24  4:12 AM    Specimen: Urine, Clean Catch   Result Value Ref Range    RBC, UA 0-2 None Seen, 0-2 /HPF    WBC, UA 0-2 None Seen, 0-2 /HPF    Bacteria, UA None Seen None Seen /HPF    Squamous Epithelial Cells, UA 0-2 None Seen, 0-2 /HPF    Hyaline Casts, UA 0-2 None Seen /LPF    Methodology Automated Microscopy    Renal Function Panel    Collection Time: 04/09/24  6:53 AM    Specimen: Blood   Result Value Ref  Range    Glucose 81 65 - 99 mg/dL    BUN 42 (H) 8 - 23 mg/dL    Creatinine 6.66 (H) 0.76 - 1.27 mg/dL    Sodium 135 (L) 136 - 145 mmol/L    Potassium 3.9 3.5 - 5.2 mmol/L    Chloride 100 98 - 107 mmol/L    CO2 22.0 22.0 - 29.0 mmol/L    Calcium 7.4 (L) 8.6 - 10.5 mg/dL    Albumin 2.4 (L) 3.5 - 5.2 g/dL    Phosphorus 4.1 2.5 - 4.5 mg/dL    Anion Gap 13.0 5.0 - 15.0 mmol/L    BUN/Creatinine Ratio 6.3 (L) 7.0 - 25.0    eGFR 7.9 (L) >60.0 mL/min/1.73   CBC (No Diff)    Collection Time: 04/09/24  6:53 AM    Specimen: Blood   Result Value Ref Range    WBC 8.08 3.40 - 10.80 10*3/mm3    RBC 2.70 (L) 4.14 - 5.80 10*6/mm3    Hemoglobin 8.1 (L) 13.0 - 17.7 g/dL    Hematocrit 25.8 (L) 37.5 - 51.0 %    MCV 95.6 79.0 - 97.0 fL    MCH 30.0 26.6 - 33.0 pg    MCHC 31.4 (L) 31.5 - 35.7 g/dL    RDW 14.4 12.3 - 15.4 %    RDW-SD 50.0 37.0 - 54.0 fl    MPV 11.1 6.0 - 12.0 fL    Platelets 144 140 - 450 10*3/mm3   Vancomycin, Random    Collection Time: 04/09/24  6:53 AM    Specimen: Blood   Result Value Ref Range    Vancomycin Random 31.10 5.00 - 40.00 mcg/mL   POC Glucose Once    Collection Time: 04/09/24 11:39 AM    Specimen: Blood   Result Value Ref Range    Glucose 88 70 - 130 mg/dL   Body fluid cell count - Body Fluid, Peritoneum    Collection Time: 04/09/24  1:57 PM    Specimen: Peritoneum; Body Fluid   Result Value Ref Range    Color, Fluid Yellow     Appearance, Fluid Hazy (A) Clear    RBC, Fluid 798 /mm3    Nucleated Cells, Fluid 6,425 /mm3    Method: Hemacytometer Method    Body fluid differential - Body Fluid, Peritoneum    Collection Time: 04/09/24  1:57 PM    Specimen: Peritoneum; Body Fluid   Result Value Ref Range    Neutrophils, Fluid 69 %    Lymphocytes, Fluid 23 %    Monocytes, Fluid 3 %    Eosinophils, Fluid 5 %    nRBC, Fluid 2 /100 WBCs         RADIOLOGY  FL C Arm During Surgery    Result Date: 4/9/2024  This procedure was auto-finalized with no dictation required.    CT Abdomen Pelvis With Contrast    Addendum Date:  4/9/2024    ADDENDUM: 04 09 24 03:03 Call From [facility]  on 04 09 02:53 (-04:00) Electronically signed by Wendy Payton DO [employer] Board of     Addendum Date: 4/9/2024    ADDENDUM: 04 09 24 00:31 Call Doctor Regarding Above results, called LILIA Azul on 04 09 00:29 (-04:00) Electronically signed by Wendy Payton DO [employer] Board of     Result Date: 4/9/2024  CR Patient: KAYLA CACERES  Time Out: 00:11 Exam(s): CT ABDOMEN + PELVIS With Contrast EXAM:   CT Abdomen and Pelvis With Intravenous Contrast CLINICAL HISTORY:    Reason for exam: Fever, cloudy PD catheter, altered mental status, hallucinations. TECHNIQUE:   Axial computed tomography images of the abdomen and pelvis with intravenous contrast.  CTDI is 55 mGy and DLP is 987 mGy-cm.  This CT exam was performed according to the principle of ALARA (As Low As Reasonably Achievable) by using one or more of the following dose reduction techniques: automated exposure control, adjustment of the mA and or kV according to patient size, and or use of iterative reconstruction technique. COMPARISON:   3 24 24 FINDINGS:   Lung bases:  Partial clearing of confluent nodular consolidation demonstrated on the prior study.  ABDOMEN:   Liver:  Unremarkable.  No mass.   Gallbladder and bile ducts:  Unremarkable.  No calcified stones.  No ductal dilation.   Pancreas:  Unremarkable.  No mass.  No ductal dilation.   Spleen:  Unremarkable.  No splenomegaly.   Adrenals:  Unremarkable.  No mass.   Kidneys and ureters:  Bilateral renal atrophy and multiple indeterminate cortical renal cystic lesions that are hyperattenuating water.  Redemonstrated is a large calculus in the right renal pelvis, without any resultant obstructive uropathy.   Stomach and bowel:  Negative for GI tract obstruction or pneumatosis.  Mild diffuse wall thickening of the small bowel.  Inflammatory changes adjacent to the near entirety of the small bowel and the right colon.   Multiple regions of extraluminal gas associated with small bowel, particularly in the right mid abdomen and right lower quadrants.  PELVIS:   Appendix:  No findings to suggest acute appendicitis.   Bladder:  Unremarkable.  No mass.   Reproductive:  Unremarkable as visualized.  ABDOMEN and PELVIS:   Intraperitoneal space:  Pneumoperitoneum, in part may be secondary to peritoneal dialysis catheter.  Moderate mesenteric edema.  Multiple foci of extraluminal gas throughout the mesentery.  No significant fluid collection.   Bones joints:  No acute fracture.  No dislocation.   Soft tissues:  Unremarkable.   Vasculature:  Unremarkable.  No abdominal aortic aneurysm.   Lymph nodes:  Unremarkable.  No enlarged lymph nodes. IMPRESSION:       Diffuse mesenteric edema and fairly extensive mesenteric portomesenteric gas.  There are multiple regions with significant extraluminal gas associated with multiple small bowel loops, most prominent in the right lower quadrant.  Findings are compatible with significant mesenteric and bowel ischemia and bowel wall disruption.  Surgical consultation recommended.     Communications:  Call Doctor Above results Electronically signed by Alberto Lara DO Diplomate [employer] Board of Rad[employer] on 04-09-24 at 0011    CT Head Without Contrast    Result Date: 4/8/2024  Patient: KAYLA CACERES  Time Out: 23:36 Exam(s): CT HEAD Without Contrast EXAM:   CT Head Without Intravenous Contrast CLINICAL HISTORY:    Reason for exam: AMS. TECHNIQUE:   Axial computed tomography images of the head brain without intravenous contrast.  CTDI is 55 mGy and DLP is 987 mGy-cm.  This CT exam was performed according to the principle of ALARA (As Low As Reasonably Achievable) by using one or more of the following dose reduction techniques: automated exposure control, adjustment of the mA and or kV according to patient size, and or use of iterative reconstruction technique. COMPARISON: Comparison made to prior head CT  from March 5, 2024. FINDINGS:   Brain:  Unremarkable.  No hemorrhage.  Mild nonspecific white matter changes.  No edema.   Ventricles: Mild ventriculomegaly.   Bones joints:  Unremarkable.  No acute fracture.   Soft tissues: Bilateral lens replacements.   Sinuses:  Unremarkable as visualized.  No acute sinusitis.   Mastoid air cells: There is a small amount of fluid in the right mastoid air cells.  No mastoid effusion. IMPRESSION:     No evidence of acute intracranial pathology.     Electronically signed by Celia Hatfield MD on 04-08-24 at 2336    XR Chest 1 View    Result Date: 4/8/2024  CHEST SINGLE VIEW  HISTORY: Fever. Hallucinations. History of aspiration pneumonia.  COMPARISON: AP chest 03/24/2024.  FINDINGS: There is elevation of the right hemidiaphragm that appears similar to the previous exam. Cardiomediastinal silhouette is within normal limits. Lungs appear clear of focal airspace disease and there is no evidence for significant change compared to the previous exam.      No interval change or evidence for active disease in the chest.  This report was finalized on 4/8/2024 11:00 PM by Dr. Margarito Kincaid M.D on Workstation: [id number]         MEDICATIONS GIVEN IN ER  Medications   sodium chloride 0.9 % flush 10 mL ( Intravenous MAR Hold 4/9/24 1502)   sodium chloride 0.9 % flush 10 mL ( Intravenous MAR Hold 4/9/24 1502)   nitroglycerin (NITROSTAT) SL tablet 0.4 mg ( Sublingual MAR Hold 4/9/24 1502)   acetaminophen (TYLENOL) tablet 650 mg ( Oral MAR Hold 4/9/24 1502)   sennosides-docusate (PERICOLACE) 8.6-50 MG per tablet 2 tablet ( Oral MAR Hold 4/9/24 1502)     And   polyethylene glycol (MIRALAX) packet 17 g ( Oral MAR Hold 4/9/24 1502)     And   bisacodyl (DULCOLAX) EC tablet 5 mg ( Oral MAR Hold 4/9/24 1502)     And   bisacodyl (DULCOLAX) suppository 10 mg ( Rectal MAR Hold 4/9/24 1502)   melatonin tablet 3 mg ( Oral MAR Hold 4/9/24 1502)   ondansetron ODT (ZOFRAN-ODT) disintegrating tablet 4 mg (  Oral MAR Hold 4/9/24 1502)     Or   ondansetron (ZOFRAN) injection 4 mg ( Intravenous MAR Hold 4/9/24 1502)   aluminum-magnesium hydroxide-simethicone (MAALOX MAX) 400-400-40 MG/5ML suspension 15 mL ( Oral MAR Hold 4/9/24 1502)   cyclobenzaprine (FLEXERIL) tablet 5 mg ( Oral MAR Hold 4/9/24 1502)   arformoterol (BROVANA) nebulizer solution 15 mcg ( Nebulization Dose Auto Held 4/20/24 2130)   ARIPiprazole (ABILIFY) tablet 2 mg ( Oral Dose Auto Held 4/17/24 2100)   atorvastatin (LIPITOR) tablet 40 mg ( Oral Dose Auto Held 4/20/24 0900)   carvedilol (COREG) tablet 6.25 mg (has no administration in time range)   insulin glargine (LANTUS, SEMGLEE) injection 10 Units ( Subcutaneous Dose Auto Held 4/17/24 2100)   ipratropium-albuterol (DUO-NEB) nebulizer solution 3 mL ( Nebulization MAR Hold 4/9/24 1502)   levothyroxine (SYNTHROID, LEVOTHROID) tablet 150 mcg ( Oral Dose Auto Held 4/17/24 0600)   pantoprazole (PROTONIX) EC tablet 40 mg ( Oral Dose Auto Held 4/20/24 0600)   saccharomyces boulardii (FLORASTOR) capsule 250 mg ( Oral Dose Auto Held 4/17/24 2100)   sevelamer (RENVELA) tablet 800 mg ( Oral Dose Auto Held 4/20/24 1800)   torsemide (DEMADEX) tablet 100 mg ( Oral Dose Auto Held 4/17/24 0900)   dextrose (GLUTOSE) oral gel 15 g ( Oral MAR Hold 4/9/24 1502)   dextrose (D50W) (25 g/50 mL) IV injection 25 g ( Intravenous MAR Hold 4/9/24 1502)   glucagon (GLUCAGEN) injection 1 mg ( Intramuscular MAR Hold 4/9/24 1502)   insulin lispro (HUMALOG/ADMELOG) injection 2-7 Units ( Subcutaneous Dose Auto Held 4/17/24 2100)   UltraBag/Dianeal/1.5% Dextrose low-peter (hale #[id number]) (DIANEAL) peritoneal dialysate 2,000 mL ( Intraperitoneal Dose Auto Held 4/17/24 2300)   micafungin sodium (MYCAMINE) 100 mg in sodium chloride 0.9 % 100 mL MBP (has no administration in time range)   sodium chloride 0.9 % flush 3 mL (has no administration in time range)   sodium chloride 0.9 % flush 3-10 mL (has no administration in time range)    lidocaine (XYLOCAINE) 1 % injection 0.5 mL (has no administration in time range)   fentaNYL citrate (PF) (SUBLIMAZE) injection 50 mcg (has no administration in time range)   midazolam (VERSED) injection 0.5 mg (has no administration in time range)   lidocaine (XYLOCAINE) 1 % injection (2 mL Injection Given 4/9/24 1658)   heparin (porcine) injection (4,000 Units Intracatheter Given 4/9/24 1659)   sodium chloride 250 mL with heparin (porcine) 2,500 Units mixture (250 mL Irrigation Given 4/9/24 1700)   bupivacaine-EPINEPHrine (MARCAINE w/EPI) 0.5% -1:[id number] injection (27 mL Injection Given 4/9/24 1714)   iopamidol (ISOVUE-300) 61 % injection 100 mL (85 mL Intravenous Given by Other 4/8/24 2250)   vancomycin (VANCOCIN) 1,000 mg in sodium chloride 0.9 % 250 mL IVPB-VTB (0 mg Intravenous Stopped 4/9/24 0157)   cefepime 1000 mg IVPB in 100 mL NS (MBP) (0 mg Intravenous Stopped 4/9/24 0052)   fluconazole (DIFLUCAN) IVPB 400 mg (0 mg Intravenous Stopped 4/9/24 0352)   OLANZapine (zyPREXA) injection 5 mg (5 mg Intramuscular Given 4/9/24 0548)         ORDERS PLACED DURING THIS VISIT:  Orders Placed This Encounter   Procedures    Blood Culture - Blood,    Blood Culture - Blood,    Respiratory Panel PCR w/COVID-19(SARS-CoV-2) RA/TANIA/GAVIN/PAD/COR/NENA In-House, NP Swab in UTM/VTM, 2 HR TAT - Swab, Nasopharynx    Body Fluid Culture - Body Fluid, Peritoneal Catheter    Fungus Culture - Body Fluid, Peritoneal Catheter    XR Chest 1 View    CT Abdomen Pelvis With Contrast    CT Head Without Contrast    FL C Arm During Surgery    Comprehensive Metabolic Panel    Lactic Acid, Plasma    CBC Auto Differential    Urinalysis With Microscopic If Indicated (No Culture) - Urine, Clean Catch    Procalcitonin    Renal Function Panel    CBC (No Diff)    Vancomycin, Random    Urinalysis, Microscopic Only - Urine, Clean Catch    Comprehensive Metabolic Panel    Magnesium    Phosphorus    Hemoglobin A1c    TSH Rfx On Abnormal To Free T4    Body  Fluid Cell Count With Differential - Body Fluid, Peritoneum    Body fluid cell count - Body Fluid, Peritoneum    Renal Function Panel    Body fluid differential - Body Fluid, Peritoneum    NPO Diet NPO Type: Sips with Meds    Undress & Gown    Vital Signs    Vital Signs    Intake & Output    Oral Care    Place Sequential Compression Device    Maintain Sequential Compression Device    Telemetry - Place Orders & Notify Provider of Results When Patient Experiences Acute Chest Pain, Dysrhythmia or Respiratory Distress    May Be Off Telemetry for Tests    Up With Assistance    Verify / Perform Chlorhexidine Skin Prep    Verify / Perform Chlorhexidine Skin Prep if Indicated (If Not Already Completed)    Chlorhexidine Shower / Bath BID Day Before Surgery    Place Sequential Compression Device    Maintain Sequential Compression Device    Weigh Patient    Intake and Output    PD Catheter Site Care    Obtain Informed Consent    Vital Signs - Per Anesthesia Protocol    Continuous Pulse Oximetry    Saline Lock & Maintain IV Access    May take Beta Blocker from home with sip of water.    Code Status and Medical Interventions:    Nephrology (on -call MD unless specified)    LHA (on-call MD unless specified) Details    Surgery (on-call MD unless specified)    Inpatient Nephrology Consult    Inpatient General Surgery Consult    Inpatient Infectious Diseases Consult    PT Consult: Eval & Treat Functional Mobility Below Baseline    Oxygen Therapy- Nasal Cannula; Titrate 1-6 LPM Per SpO2; 90 - 95%    Incentive Spirometry    Oxygen Therapy- Nasal Cannula; Titrate 1-6 LPM Per SpO2; 90 - 95%    POC Glucose 4x Daily Before Meals & at Bedtime    POC Glucose Once    POC Glucose PRN    Insert Peripheral IV    Insert Peripheral IV    Continuous Ambulatory Peritoneal Dialysis    Inpatient Admission    CBC & Differential    CBC & Differential         OUTPATIENT MEDICATION MANAGEMENT:  Current Facility-Administered Medications Ordered in Epic    Medication Dose Route Frequency Provider Last Rate Last Admin    [Transfer Hold] acetaminophen (TYLENOL) tablet 650 mg  650 mg Oral Q4H PRN Germania Chow APRN   650 mg at 04/09/24 0740    [Transfer Hold] aluminum-magnesium hydroxide-simethicone (MAALOX MAX) 400-400-40 MG/5ML suspension 15 mL  15 mL Oral Q6H PRN Germania Chow APRN        [Transfer Hold] arformoterol (BROVANA) nebulizer solution 15 mcg  15 mcg Nebulization BID - RT Shiraz Bolton MD   15 mcg at 04/09/24 1023    [Transfer Hold] ARIPiprazole (ABILIFY) tablet 2 mg  2 mg Oral BID Shiraz Bolton MD   2 mg at 04/09/24 1130    [Transfer Hold] atorvastatin (LIPITOR) tablet 40 mg  40 mg Oral Daily Shiraz Bolton MD   40 mg at 04/09/24 1129    [Transfer Hold] sennosides-docusate (PERICOLACE) 8.6-50 MG per tablet 2 tablet  2 tablet Oral BID PRN Germania Chow APRN        And    [Transfer Hold] polyethylene glycol (MIRALAX) packet 17 g  17 g Oral Daily PRN Germania Chow APRN        And    [Transfer Hold] bisacodyl (DULCOLAX) EC tablet 5 mg  5 mg Oral Daily PRN Germania Chow APRN        And    [Transfer Hold] bisacodyl (DULCOLAX) suppository 10 mg  10 mg Rectal Daily PRN Germania Chow APRN        bupivacaine-EPINEPHrine (MARCAINE w/EPI) 0.5% -[id number] injection    PRN Jose Berry MD   27 mL at 04/09/24 1714    carvedilol (COREG) tablet 6.25 mg  6.25 mg Oral Nightly Shiraz Bolton MD        [Transfer Hold] cyclobenzaprine (FLEXERIL) tablet 5 mg  5 mg Oral TID PRN Shiraz Bolton MD        [Transfer Hold] dextrose (D50W) (25 g/50 mL) IV injection 25 g  25 g Intravenous Q15 Min PRN Shiraz Bolton MD        [Transfer Hold] dextrose (GLUTOSE) oral gel 15 g  15 g Oral Q15 Min PRN Shiraz Bolton MD        ePHEDrine injection   Intravenous PRN Kisha Cameron, KIRA   10 mg at 04/09/24 1650    fentaNYL citrate (PF) (SUBLIMAZE) injection 50 mcg  50 mcg Intravenous Once PRN Max Garcia MD        [Transfer Hold]  glucagon (GLUCAGEN) injection 1 mg  1 mg Intramuscular Q15 Min PRN Shiraz Bolton MD        heparin (porcine) injection    PRN Jose Berry MD   4,000 Units at 04/09/24 1659    [Transfer Hold] insulin glargine (LANTUS, SEMGLEE) injection 10 Units  10 Units Subcutaneous Nightly Shiraz Bolton MD        [Transfer Hold] insulin lispro (HUMALOG/ADMELOG) injection 2-7 Units  2-7 Units Subcutaneous 4x Daily AC & at Bedtime Shiraz Bolton MD        [Transfer Hold] ipratropium-albuterol (DUO-NEB) nebulizer solution 3 mL  3 mL Nebulization Q4H PRN Shiraz Bolton MD        [Transfer Hold] levothyroxine (SYNTHROID, LEVOTHROID) tablet 150 mcg  150 mcg Oral Q AM Shiraz Bolton MD   150 mcg at 04/09/24 1129    lidocaine (XYLOCAINE) 1 % injection 0.5 mL  0.5 mL Intradermal Once PRN Max Garcia MD        lidocaine (XYLOCAINE) 1 % injection    PRN Jose Berry MD   2 mL at 04/09/24 1658    lidocaine (XYLOCAINE) 2% injection   Intravenous PRN Kisha Cameron, KIRA   50 mg at 04/09/24 1633    [Transfer Hold] melatonin tablet 3 mg  3 mg Oral Nightly PRN Germania Chow APRN        micafungin sodium (MYCAMINE) 100 mg in sodium chloride 0.9 % 100 mL MBP  100 mg Intravenous Q24H Adam Bunch MD        midazolam (VERSED) injection 0.5 mg  0.5 mg Intravenous Q5 Min PRN Max Garcia MD        [Transfer Hold] nitroglycerin (NITROSTAT) SL tablet 0.4 mg  0.4 mg Sublingual Q5 Min PRN Germania Chow APRN        [Transfer Hold] ondansetron ODT (ZOFRAN-ODT) disintegrating tablet 4 mg  4 mg Oral Q6H PRN Germania Chow APRN        Or    [Transfer Hold] ondansetron (ZOFRAN) injection 4 mg  4 mg Intravenous Q6H PRN Germania Chow APRN        [Transfer Hold] pantoprazole (PROTONIX) EC tablet 40 mg  40 mg Oral Q AM Shiraz Bolton MD   40 mg at 04/09/24 1130    phenylephrine (THIAGO-SYNEPHRINE) injection   Intravenous PRN Kisha Cameron CRNA   200 mcg at 04/09/24 1711    propofol  (DIPRIVAN) infusion 10 mg/mL 100 mL   Intravenous Continuous PRN Ray, Kisha, CRNA   Stopped at 04/09/24 1724    Propofol (DIPRIVAN) injection   Intravenous PRN Ray, Kisha, CRNA   50 mg at 04/09/24 1633    [Transfer Hold] saccharomyces boulardii (FLORASTOR) capsule 250 mg  250 mg Oral BID Shiraz Bolton MD   250 mg at 04/09/24 1130    [Transfer Hold] sevelamer (RENVELA) tablet 800 mg  800 mg Oral BID With Meals Shiraz Bolton MD   800 mg at 04/09/24 1129    [Transfer Hold] sodium chloride 0.9 % flush 10 mL  10 mL Intravenous PRN Emergency, Triage Protocol, MD        [Transfer Hold] sodium chloride 0.9 % flush 10 mL  10 mL Intravenous PRN Germania Chow, BECKY        sodium chloride 0.9 % flush 3 mL  3 mL Intravenous Q12H Max Garcia MD        sodium chloride 0.9 % flush 3-10 mL  3-10 mL Intravenous PRN Max Garcia MD        sodium chloride 0.9 % infusion   Intravenous Continuous PRN Rakesh, Kisha, CRNA   New Bag at 04/09/24 1622    sodium chloride 250 mL with heparin (porcine) 2,500 Units mixture    PRN Jose Berry MD   250 mL at 04/09/24 1700    [Transfer Hold] torsemide (DEMADEX) tablet 100 mg  100 mg Oral Daily Shiraz Bolton MD   100 mg at 04/09/24 1129    [Transfer Hold] UltraBag/Dianeal/1.5% Dextrose low-peter (hale #[id number]) (DIANEAL) peritoneal dialysate 2,000 mL  2,000 mL Intraperitoneal 4 Exchanges Daily - Dwell Overnight Dayne Rivas MD         No current [facility]-ordered outpatient medications on file.             PROGRESS, DATA ANALYSIS, CONSULTS, AND MEDICAL DECISION MAKING  All labs have been independently interpreted by me.  All radiology studies have been reviewed by me. All EKG's have been independently viewed and interpreted by me.  Discussion below represents my analysis of pertinent findings related to patient's condition, differential diagnosis, treatment plan and final disposition.      DIFFERENTIAL DIAGNOSIS INCLUDE BUT NOT LIMITED TO:     UTI, SREEDHAR,  sepsis    Clinical Scores: N/A      ED Course as of 04/09/24 1734   Mon Apr 08, 2024   2304 Per my independent interpretation of the chest x-ray, there is no obvious pneumothorax. [DC]   2335 I discussed the case with MD Evelyn with Nephology at this time regarding the patient.  I discussed work-up, results, concerns.  I discussed the consulting provider's desire for adding cefepime, vancomycin, and diflucan and agrees to consult in the hospital tomorrow.    [DC]   Tue Apr 09, 2024   0023 Discussed with BECKY Waters for A, who agrees admit of behalf of Dr. Coleman. [JR]   0023 I reviewed patient's CT abdomen report and I have independently interpreted the images as well.  There are some small foci of pneumoperitoneum which I suspect are secondary to the PD catheter.  He has a benign abdominal exam with only some mild epigastric tenderness, no rebound or guarding to suggest peritonitis.  I have low suspicion for perforated viscus however I will discuss with general surgery. [JR]   0055 I discussed the CT findings with Dr. Brooke, general surgery.  Also explained patient's clinical presentation, physical exam, and laboratory findings.  She reviewed the imaging and also compared it to his recent CT abdomen from March 24.  She states that he had some pneumoperitoneum prior deviously as well.  She will evaluate the patient request to remain n.p.o. but she did not think that the CT findings today were consistent with ischemic bowel or bowel perforation. [JR]      ED Course User Index  [DC] Winston Pimentel PA  [JR] Lorne Carmona MD           AS OF 17:34 EDT VITALS:    BP - 128/56  HR - 87  TEMP - 98.6 °F (37 °C) (Oral)  O2 SATS - 97%    COMPLEXITY OF CARE  The patient requires admission.      DIAGNOSIS  Final diagnoses:   Sepsis, due to unspecified organism, unspecified whether acute organ dysfunction present   SBP (spontaneous bacterial peritonitis)   Fungal infection   ESRD on peritoneal dialysis          DISPOSITION  ED Disposition       ED Disposition   Decision to Admit    Condition   --    Comment   Level of Care: Telemetry [5]   Diagnosis: Sepsis [[id number]]   Admitting Physician: DONNA TORRES [[id number]]   Attending Physician: DONNA TORRES [[id number]]   Certification: I Certify That Inpatient Hospital Services Are Medically Necessary For Greater Than 2 Midnights                  Please note that portions of this document were completed with a voice recognition program.    Note Disclaimer: At [facility], we believe that sharing information builds trust and better relationships. You are receiving this note because you recently visited [facility]. It is possible you will see health information before a provider has talked with you about it. This kind of information can be easy to misunderstand. To help you fully understand what it means for your health, we urge you to discuss this note with your provider.         Winston Pimentel PA  04/12/24 1548

## 2024-04-09 NOTE — NURSING NOTE
1400 -Daughter at bedside. PD catheter accessed, fluid collected as ordered. Sent to lab. No further drainage noted. Daughter reports not instilling PD last night as usual as he was heading into ED. Catheter capped. Temp 99.1  PACU nurse notified.

## 2024-04-09 NOTE — H&P
[facility]   HISTORY AND PHYSICAL    Patient Name: Nelson Wang  : 1945  MRN: [id number]  Primary Care Physician:  Janna Mo MD  Date of admission: 2024    Subjective   Subjective     Chief Complaint: abd pain    History of Present Illness  Very pleasant 80yo gentleman currently being treated for PD catheter-related infection as outpt by his Nephrologist (intra-catheter Vanc), who was supposed to see Gen Surg (Berry) tomorrow to discuss catheter removal, when three things happened:   1) PD fluid culture positive for yeast  2) pt developed high fever  3) pt reported hallucinations to his dtr  Pt presented to ER as a result of all these. At present he feels fine. Abd pain is controlled. He denies any N/V/D. He is tolerating diet recently but appetite is poor. No subjective fever at present. He makes urine and denies any LUTS. No cough or SOA. No CP or palp. No hallucinations here.      Review of Systems   Constitutional:  Positive for appetite change, fatigue and fever. Negative for chills and diaphoresis.   HENT:  Negative for congestion, nosebleeds, rhinorrhea, trouble swallowing and voice change.    Eyes:  Negative for pain and visual disturbance.   Gastrointestinal:  Positive for abdominal pain. Negative for blood in stool, diarrhea, nausea and vomiting.   Endocrine: Negative for cold intolerance and heat intolerance.   Genitourinary:  Negative for difficulty urinating, dysuria and hematuria.   Musculoskeletal:  Negative for back pain and neck pain.   Skin:  Positive for color change (Vitiligo). Negative for rash and wound.   Allergic/Immunologic: Negative for environmental allergies and food allergies.   Neurological:  Negative for seizures, syncope, speech difficulty and headaches.   Hematological:  Negative for adenopathy. Does not bruise/bleed easily.   Psychiatric/Behavioral:  Positive for hallucinations. Negative for behavioral problems and confusion.         Personal History      Past Medical History:   Diagnosis Date    Anemia     Ankle pain, right     CKD (chronic kidney disease), stage IV     Diabetes     Gout     High cholesterol     HL (hearing loss)     Hyperkalemia     Hypertension     Hypothyroidism     Psoriasis     Vitiligo        Past Surgical History:   Procedure Laterality Date    ANKLE SURGERY  11/1990    APPENDECTOMY N/A 1954    BRONCHOSCOPY N/A 3/1/2024    Procedure: BRONCHOSCOPY WITH BAL AND WASHINGS;  Surgeon: Sandra Espinal MD;  Location: [facility];  Service: Pulmonary;  Laterality: N/A;  PNEUMONIA    COLONOSCOPY N/A 06/2022    [facility]    HIP BIPOLAR REPLACEMENT Right 01/2000    INSERTION PERITONEAL DIALYSIS CATHETER N/A 3/27/2023    Procedure: LAPAROSCOPIC INSERTION PERITONEAL DIALYSIS CATHETER, LAPAROSCOPIC OMENTOPEXY WITH LYSIS OF ADHESIONS;  Surgeon: Jose Berry MD;  Location: [facility] OR;  Service: General;  Laterality: N/A;    INSERTION PERITONEAL DIALYSIS CATHETER Left 7/23/2023    Procedure: REVISION OF PERITONEAL DIALYSIS CATHETER;  Surgeon: Radha Oreilly MD;  Location: [facility] OR;  Service: General;  Laterality: Left;    RENAL BIOPSY Left 07/15/2022       Family History: family history includes Cancer (age of onset: 35) in his sister; Diabetes in his brother, mother, and sister; Heart disease in his father and paternal uncle. Otherwise pertinent FHx was reviewed and not pertinent to current issue.    Social History:  reports that he quit smoking about 24 years ago. His smoking use included cigarettes. He started smoking about 34 years ago. He has a 10 pack-year smoking history. He has been exposed to tobacco smoke. He has never used smokeless tobacco. He reports current alcohol use. He reports that he does not use drugs.    Home Medications:  ARIPiprazole, Beclomethasone Diprop HFA, Insulin Glargine, Risankizumab-rzaa, arformoterol, atorvastatin, carvedilol, cyclobenzaprine, gentamicin, ipratropium-albuterol,  levothyroxine, pantoprazole, sevelamer, and torsemide    Allergies:  No Known Allergies    Objective    Objective     Vitals:   Temp:  [98.5 °F (36.9 °C)-101.4 °F (38.6 °C)] 101.4 °F (38.6 °C)  Heart Rate:  [82-99] 87  Resp:  [18] 18  BP: ()/(46-87) 118/71  Flow (L/min):  [2] 2    Physical Exam  Vitals and nursing note reviewed. Exam conducted with a chaperone present (RN and Dr. Bunch).   Constitutional:       General: He is not in acute distress.     Appearance: He is ill-appearing (chronically). He is not toxic-appearing or diaphoretic.   HENT:      Head: Normocephalic.      Nose: Nose normal.      Mouth/Throat:      Mouth: Mucous membranes are moist.      Pharynx: Oropharynx is clear.   Eyes:      General: No scleral icterus.        Right eye: No discharge.         Left eye: No discharge.      Conjunctiva/sclera: Conjunctivae normal.   Cardiovascular:      Rate and Rhythm: Normal rate and regular rhythm.      Pulses: Normal pulses.   Pulmonary:      Effort: Pulmonary effort is normal. No respiratory distress.      Breath sounds: Normal breath sounds. No wheezing or rales.   Abdominal:      General: Bowel sounds are normal. There is no distension.      Palpations: Abdomen is soft.      Tenderness: There is abdominal tenderness (mild, diffuse). There is no guarding or rebound.      Comments: PD catheter LUQ, no surrounding erythema   Musculoskeletal:         General: No swelling or deformity.      Cervical back: Neck supple.   Skin:     General: Skin is warm and dry.      Capillary Refill: Capillary refill takes less than 2 seconds.      Coloration: Skin is not jaundiced.      Comments: Vitiligo present   Neurological:      General: No focal deficit present.      Mental Status: He is alert and oriented to person, place, and time. Mental status is at baseline.      Cranial Nerves: No cranial nerve deficit.      Coordination: Coordination normal.   Psychiatric:         Mood and Affect: Mood normal.          Behavior: Behavior normal.         Thought Content: Thought content normal.         Result Review    Result Review:  I have personally reviewed the results from the time of this admission to 4/9/2024 09:03 EDT and agree with these findings:  [x]  Laboratory list / accordion  [x]  Microbiology  [x]  Radiology  [x]  EKG/Telemetry   []  Cardiology/Vascular   []  Pathology  [x]  Old records  []  Other:  Most notable findings include:    CT A/P Diffuse mesenteric edema and fairly extensive   mesenteric portomesenteric gas.  There are multiple regions with significant extraluminal gas associated with multiple small bowel loops, most prominent in the right lower quadrant. Findings are compatible with significant mesenteric and bowel ischemia and bowel wall disruption. Surgical consultation recommended.   Na+ 134, Cr 6.31, LFTs wnl, K+ 4.2, PCT 1.25, LA 1.6  WBC 9.96 with left shift  Hgb 8.6, Plt 128  UA neg for infection  CXR NAD      Assessment & Plan   Assessment / Plan     Brief Patient Summary:  Nelson Wang is a 79 y.o. male who presents with sepsis/fever/hallucinations due to SBP due to PD catheter.    Active Hospital Problems:  Active Hospital Problems    Diagnosis     **Spontaneous bacterial peritonitis     Sepsis     Type 2 diabetes mellitus, with long-term current use of insulin     GERD (gastroesophageal reflux disease)     Immunosuppression due to drug therapy     Bipolar disorder     Chronic respiratory failure with hypoxia     End stage renal disease     Peritoneal dialysis catheter in place     Essential hypertension     Anemia due to chronic kidney disease     Psoriasis     Monoclonal gammopathy of unknown significance (MGUS)     Hypothyroidism      Plan:   Sepsis  PD catheter-related SBP  Continue Cefepime, Vanc, and Diflucan for now  Blood cultures sent  Follow outside fluid cultures  Consulted ID  Appreciate Surg attention to pt, they will discuss catheter removal with Renal and ID  Defer volume mgmt  to Renal  No significant hypotension or tachycardia so far here    Immunosuppressed (Skyrizi for psoriasis)  H/o CDiff colitis  ID consulted  Minimize abx exposure  Consider stopping Skyrizi altogether    ESRD  Will need HD at least temporarily if not indefinitely  Defer to Renal  K+ looks fine, not uremic    Anemia of CKD  Hgb stable around baseline  Monitor    DM2  Continue nightly Lantus at attenuated dose  Cover with SSI  Check A1c    HypoT4  Continue L-T4  Check TFTs    Recent hospitalizations for aspiration PNA  Chronic hypoxic resp failure  Continue supplemental O2 as needed  IS ordered  Continue Brovana and PRN DuoNebs    HTN  BPs acceptable at present  Continue Coreg with holding parameters    Further orders to follow as suggested by evolving hospital course   D/w pt, Dr. Rivas, and Dr. Bunch    DVT prophylaxis:  Mechanical DVT prophylaxis orders are present.      CODE STATUS:    Code Status (Patient has no pulse and is not breathing): CPR (Attempt to Resuscitate)  Medical Interventions (Patient has pulse or is breathing): Full Support    Admission Status:  I believe this patient meets inpatient status.    Shiraz Bolton MD

## 2024-04-09 NOTE — PROGRESS NOTES
"[facility] Clinical Pharmacy Services: Vancomycin Pharmacokinetic Initial Consult Note    Nelson Wang is a 79 y.o. male who is on day 1 of pharmacy to dose vancomycin.    Indication: Sepsis  Consulting Provider: BECKY Waters  Planned Duration of Therapy: 5 days  Loading Dose Ordered or Given: 1000 mg on 4/9/24 at 0157  Target: Dose by Levels    Other Antimicrobials: Cefepime    Vitals/Labs  Ht: 170.2 cm (67\"); Wt: 78 kg (171 lb 15.3 oz)  Temp Readings from Last 1 Encounters:   04/09/24 98.5 °F (36.9 °C) (Oral)    Estimated Creatinine Clearance: 10.6 mL/min (A) (by C-G formula based on SCr of 6.31 mg/dL (H)).  Dialysis MWF     Results from last 7 days   Lab Units 04/08/24  2140   CREATININE mg/dL 6.31*   WBC 10*3/mm3 9.96     Assessment/Plan:    Vancomycin Dose:   Pharmacy will intermittently pulse dose Vancomycin based on levels.   Vanc Random has been ordered for 4/9/24 at 1300     Pharmacy will follow patient's kidney function and will adjust doses and obtain levels as necessary. Thank you for involving pharmacy in this patient's care. Please contact pharmacy with any questions or concerns.                           Alberto Garcia [facility]  Clinical Pharmacist    "

## 2024-04-09 NOTE — CONSULTS
General Surgery Consultation    Consulting Physician: Gabriela Brooke MD  Referring: EDSON Carvajal    Reason for consultation:   Evaluate for bowel ischemia    CC:   Fever, AMS    HPI:   The patient is a 79 y.o. male with hypertensive nephrosclerosis, diabetic nephropathy, and resultant ESRD on peritoneal dialysis who presented to the hospital with fever and altered mental status. He was brought to the ER by his daughter, who is not present at bedside during exam, but patient is alert and oriented and able to provide some history. He states his PD catheter has been in for about 2 years. It was placed by Dr. Berry 3/27/23.  He states he has only had to have it revised in the past due to accidentally cutting the tubing, but not for prior use malfunction or infection.  Revision was performed by Dr. Oreilly on 7/23/23.      He has been evaluated for possible PD catheter infection in the past week at [facility] by his Nephrologist. He reports positive fungal culture from dialysate last week. He was scheduled to see Dr. Berry later today for evaluation for PD catheter removal but came in to the ER due to fever as well as hallucinations noted by daughter.    He denies any abdominal pain, nausea, or vomiting. He reports having fevers, stating his fever was over 101F here. He reports normal bowel function, denies constipation associated with his PD catheter, states his last bowel movement was this afternoon. Denies blood in his stool. Denies other symptoms.    Past Medical History:  ESRD on peritoneal dialysis  Hypertension  T2DM  HLD  Gout  Hypothyroidism  Psoriasis with chronic immunosuppression due to Skyrizi  Vitiligo  Aspiration pneumonia  E. coli bacteremia  C. difficile colitis  Prior episode of delirium vs. toxic encephalopathy  Bipolar disorder  Hearing impairment  CAD  Vertigo  GERD    Past Surgical History:  PD catheter placement 3/27/23 by Dr. Berry  PD catheter revision (due to pt accidentally cutting  catheter) by Dr. Oreilly 7/23/23  Left renal biopsy  Appendectomy  Colonoscopy  Right hip replacement  Bronchoscopy    Medications:      Current Facility-Administered Medications:     acetaminophen (TYLENOL) tablet 650 mg, 650 mg, Oral, Q4H PRN, Germania Chow APRN    aluminum-magnesium hydroxide-simethicone (MAALOX MAX) 400-400-40 MG/5ML suspension 15 mL, 15 mL, Oral, Q6H PRN, Germania Chow APRN    sennosides-docusate (PERICOLACE) 8.6-50 MG per tablet 2 tablet, 2 tablet, Oral, BID PRN **AND** polyethylene glycol (MIRALAX) packet 17 g, 17 g, Oral, Daily PRN **AND** bisacodyl (DULCOLAX) EC tablet 5 mg, 5 mg, Oral, Daily PRN **AND** bisacodyl (DULCOLAX) suppository 10 mg, 10 mg, Rectal, Daily PRN, Germania Chow APRN    [START ON 4/10/2024] cefepime 2000 mg IVPB in 100 mL NS (MBP), 2,000 mg, Intravenous, Q24H, Gremania Chow APRN    fluconazole (DIFLUCAN) IVPB 400 mg, 400 mg, Intravenous, Once, Winston Pimentel PA, Last Rate: 100 mL/hr at 04/09/24 0059, 400 mg at 04/09/24 0059    melatonin tablet 3 mg, 3 mg, Oral, Nightly PRN, Germania Chow APRN    nitroglycerin (NITROSTAT) SL tablet 0.4 mg, 0.4 mg, Sublingual, Q5 Min PRN, Germania Chow APRN    ondansetron ODT (ZOFRAN-ODT) disintegrating tablet 4 mg, 4 mg, Oral, Q6H PRN **OR** ondansetron (ZOFRAN) injection 4 mg, 4 mg, Intravenous, Q6H PRN, Germania Chow APRN    Pharmacy to dose vancomycin, , Does not apply, Continuous PRN, Germania Chow APRN    sodium chloride 0.9 % flush 10 mL, 10 mL, Intravenous, PRN, Emergency, Triage Protocol, MD    sodium chloride 0.9 % flush 10 mL, 10 mL, Intravenous, PRN, Germania Chow, BECKY    Vancomycin Pharmacy Intermittent/Pulse Dosing, , Does not apply, Daily, Germania Chow, BECKY    Current Outpatient Medications:     arformoterol (BROVANA) 15 MCG/2ML nebulizer solution, Take 2 mL by nebulization 2 (Two) Times a Day for 30 days. Indications: Chronic Obstructive Lung Disease,  J 44.9, Disp: 120 mL, Rfl: 0    ARIPiprazole (ABILIFY) 2 MG tablet, Take 2 tablets by mouth Daily., Disp: 180 tablet, Rfl: 0    atorvastatin (LIPITOR) 40 MG tablet, Take 1 tablet by mouth Daily for 30 days., Disp: 30 tablet, Rfl: 0    Beclomethasone Diprop HFA (QVAR Redihaler) 40 MCG/ACT inhaler, Inhale 2 puffs 2 (Two) Times a Day., Disp: 10.6 g, Rfl: 3    carvedilol (COREG) 6.25 MG tablet, Take 1 tablet by mouth 2 (Two) Times a Day for 30 days., Disp: 60 tablet, Rfl: 0    cyclobenzaprine (FLEXERIL) 5 MG tablet, Take 1 tablet by mouth 3 (Three) Times a Day As Needed for Muscle Spasms., Disp: 90 tablet, Rfl: 1    gentamicin (GARAMYCIN) 0.1 % cream, Apply 1 Application topically to the appropriate area as directed Daily., Disp: 30 g, Rfl: 0    Insulin Glargine (LANTUS SOLOSTAR) 100 UNIT/ML injection pen, Inject 20 Units under the skin into the appropriate area as directed Every Night., Disp: 12 mL, Rfl: 2    ipratropium-albuterol (DUO-NEB) 0.5-2.5 mg/3 ml nebulizer, Take 3 mL by nebulization Every 4 (Four) Hours As Needed for Wheezing or Shortness of Air. Indications: Chronic Obstructive Lung Disease, J44.9, Disp: 360 mL, Rfl: 0    levothyroxine (SYNTHROID, LEVOTHROID) 150 MCG tablet, Take 1 tablet by mouth Every Morning for 30 days., Disp: 30 tablet, Rfl: 0    pantoprazole (PROTONIX) 40 MG EC tablet, Take 1 tablet by mouth Every Morning for 30 days., Disp: 30 tablet, Rfl: 0    Risankizumab-rzaa (SKYRIZI, 150 MG DOSE, SC), Inject  under the skin into the appropriate area as directed. Once every 3 months, Disp: , Rfl:     sevelamer (RENVELA) 800 MG tablet, Take 1 tablet by mouth 2 (Two) Times a Day With Meals for 30 days., Disp: 60 tablet, Rfl: 0    torsemide (DEMADEX) 100 MG tablet, Take 1 tablet by mouth Daily for 30 days., Disp: 30 tablet, Rfl: 0    Allergies:   No Known Allergies    Social History:   Social History     Socioeconomic History    Marital status:    Tobacco Use    Smoking status: Former      Current packs/day: 0.00     Average packs/day: 1 pack/day for 10.0 years (10.0 ttl pk-yrs)     Types: Cigarettes     Start date:      Quit date:      Years since quittin.2     Passive exposure: Past    Smokeless tobacco: Never    Tobacco comments:     quit 25 years ago, chews nicotine gum   Vaping Use    Vaping status: Never Used   Substance and Sexual Activity    Alcohol use: Yes     Comment: 1 DRINK/DAY    Drug use: Never    Sexual activity: Defer       Family History:   Family History   Problem Relation Age of Onset    Diabetes Mother     Heart disease Father     Cancer Sister 35    Diabetes Sister     Diabetes Brother     Heart disease Paternal Uncle     Malig Hyperthermia Neg Hx        Review of Systems:  Constitutional: +fevers, denies chills  Eyes: denies vision changes, scleral icterus  Cardiovascular: denies chest pain or palpitations   Respiratory: +cough  Gastrointestinal:  denies abdominal pain, nausea, vomiting, diarrhea, constipation, melena, hematochezia  Musculoskeletal: +gout  Neurologic: denies headache; +hard of hearing; +hallucinations (reportedly per daughter)  Skin: denies rash or jaundice     Physical Exam:   Vitals:    24 0121   BP: 104/48   Pulse: 90   Resp:    Temp:    SpO2: 93%     GENERAL: alert, interactive, cooperative, answering questions appropriately, oriented to self, place, situation, gregarious  HEENT: no scleral icterus; moist mucous membranes  NECK: Supple  RESPIRATORY: normal work of breathing on room air  CARDIOVASCULAR: regular rate, normotensive on monitor, no pedal edema  GASTROINTESTINAL: abdomen very soft, mildly distended without tenderness, no rebound or guarding, LUQ peritoneal dialysis catheter site without any redness, swelling, or drainage, catheter tunnel nontender to palpation, fluid in tubing is nonbilious/nonfeculent  MUSCULOSKELETAL: no cyanosis or edema   NEUROLOGIC: alert and oriented, normal speech, no gross focal deficits   SKIN: warm,  dry, +vitiligo    Diagnostic workup:     Pertinent labs:   Results from last 7 days   Lab Units 04/08/24  2140   WBC 10*3/mm3 9.96   HEMOGLOBIN g/dL 8.6*   HEMATOCRIT % 27.5*   PLATELETS 10*3/mm3 128*     Results from last 7 days   Lab Units 04/08/24  2140   SODIUM mmol/L 134*   POTASSIUM mmol/L 4.2   CHLORIDE mmol/L 95*   CO2 mmol/L 24.3   BUN mg/dL 46*   CREATININE mg/dL 6.31*   CALCIUM mg/dL 8.3*   BILIRUBIN mg/dL 0.4   ALK PHOS U/L 55   ALT (SGPT) U/L 10   AST (SGOT) U/L 19   GLUCOSE mg/dL 122*     Respiratory PCR negative  Blood cultures x 2 pending  Lactic acid 1.6  Procalcitonin 1.25  A1c 6.2% (on 4/5/24)  Peritoneal fluid cell count and differential from 4/5/24: colorless, cloudy fluid with 1745 WBC, 61 erythrocytes, 83% PMNs, 15% mononuclear cells, 2% eosinophils    I am not able to find the patient's reported positive fungal culture in [facility] or [facility]    Imaging:  CT head without contrast report reviewed- no acute intracranial findings    CT abdomen and pelvis 4/8/24 images and report reviewed - there is no bowel obstruction or pneumatosis of the intestines; there is mild diffuse wall thickening of the small bowel; there are inflammatory changes of the small bowel and right colon; multiple regions of extraluminal gas are noted; no appendicitis; there is mesenteric edema with portomesenteric gas; there is free fluid in the setting of PD catheter without loculated or rim-enhancing collection; the PD catheter appears appropriately positioned in the pelvis.  Compared to noncontrasted scan from 3/24/24 it appears there is more pneuoperitoneum and mesenteric edema.    Discussed with Dr. Lara regarding CT findings; there is no portal venous gas at the liver, no hypoenhancement of the bowel wall, or any identifiable defects of the bowel wall, but there is more extraluminal gas that appears to be tracking along both the small bowel wall in the RLQ and the transverse colon wall    Assessment and plan:   79  year old gentleman with ESRD on peritoneal dialysis presenting with fever, altered mental status, and suspected spontaneous bacterial peritonitis vs peritoneal fungal infection     Patient is febrile with Tm 101.5. No hemodynamic instability. WBC 9.9. Lactate 1.6. Cell count and differential from peritoneal fluid 4/5 demonstrates elevated WBC with PMN predominance concerning for bacterial/fungal infection.   Abdominal exam is nontender, with nonbilious fluid in PD catheter and no evidence of tunnel/exit site infection.   CT does show increased mesenteric edema and pneumoperitoneum; the latter can be seen in the setting of PD, and with the patient having normal bowel function, a nontender abdominal exam, normal WBC, and normal lactate, I feel his findings are related to SBP or fungal peritonitis rather than hollow viscus perforation.  Agree with vanc/cefepime and diflucan for SBP.  Continue NPO, discuss with Nephrology in AM possible need for PD catheter removal and hemodialysis.    Gabriela Brooke M.D.  General, Robotic, and Endoscopic Surgery  [facility]    [address], [address]  [place], [place], [address]  P: [phone]  F: [phone]

## 2024-04-09 NOTE — PLAN OF CARE
Problem: Adult Inpatient Plan of Care  Goal: Patient-Specific Goal (Individualized)  Outcome: Ongoing, Not Progressing  Goal: Absence of Hospital-Acquired Illness or Injury  Outcome: Ongoing, Not Progressing  Intervention: Identify and Manage Fall Risk  Description: Perform standard risk assessment on admission using a validated tool or comprehensive approach appropriate to the patient; reassess fall risk frequently, with change in status or transfer to another level of care.  Communicate fall injury risk to interprofessional healthcare team.  Determine need for increased observation, equipment and environmental modification, such as low bed, signage and supportive, nonskid footwear.  Adjust safety measures to individual developmental age, stage and identified risk factors.  Reinforce the importance of safety and physical activity with patient and family.  Perform regular intentional rounding to assess need for position change, pain assessment and personal needs, including assistance with toileting.  Recent Flowsheet Documentation  Taken 4/9/2024 0900 by Caroline Wills RN  Safety Promotion/Fall Prevention:   assistive device/personal items within reach   clutter free environment maintained   fall prevention program maintained   nonskid shoes/slippers when out of bed   room organization consistent   safety round/check completed  Intervention: Prevent Skin Injury  Description: Perform a screening for skin injury risk, such as pressure or moisture associated skin damage on admission and at regular intervals throughout hospital stay.  Keep all areas of skin (especially folds) clean and dry.  Maintain adequate skin hydration.  Relieve and redistribute pressure and protect bony prominences; implement measures based on patient-specific risk factors.  Match turning and repositioning schedule to clinical condition.  Encourage weight shift frequently; assist with reposition if unable to complete independently.  Float heels  off bed; avoid pressure on the Achilles tendon.  Keep skin free from extended contact with medical devices.  Encourage functional activity and mobility, as early as tolerated.  Use aids (e.g., slide boards, mechanical lift) during transfer.  Recent Flowsheet Documentation  Taken 4/9/2024 0900 by Caroline Wills RN  Body Position:   position changed independently   right   side-lying   neutral body alignment   neutral head position  Skin Protection:   adhesive use limited   tubing/devices free from skin contact  Intervention: Prevent Infection  Description: Maintain skin and mucous membrane integrity; promote hand, oral and pulmonary hygiene.  Optimize fluid balance, nutrition, sleep and glycemic control to maximize infection resistance.  Identify potential sources of infection early to prevent or mitigate progression of infection (e.g., wound, lines, devices).  Evaluate ongoing need for invasive devices; remove promptly when no longer indicated.  Recent Flowsheet Documentation  Taken 4/9/2024 0900 by Caroline Wills RN  Infection Prevention:   environmental surveillance performed   equipment surfaces disinfected   hand hygiene promoted   single patient room provided  Goal: Optimal Comfort and Wellbeing  Outcome: Ongoing, Not Progressing  Intervention: Monitor Pain and Promote Comfort  Description: Assess pain level, treatment efficacy and patient response at regular intervals using a consistent pain scale.  Consider the presence and impact of preexisting chronic pain.  Encourage patient and caregiver involvement in pain assessment, interventions and safety measures.  Recent Flowsheet Documentation  Taken 4/9/2024 0900 by Caroline Wills, RN  Pain Management Interventions:   care clustered   pillow support provided   quiet environment facilitated  Intervention: Provide Person-Centered Care  Description: Use a family-focused approach to care.  Develop trust and rapport by proactively providing information, encouraging  questions, addressing concerns and offering reassurance.  Acknowledge emotional response to hospitalization.  Recognize and utilize personal coping strategies.  Honor spiritual and cultural preferences.  Recent Flowsheet Documentation  Taken 4/9/2024 0900 by Caroline Wills, RN  Trust Relationship/Rapport:   care explained   choices provided   emotional support provided   questions answered   empathic listening provided   questions encouraged   reassurance provided   thoughts/feelings acknowledged  Goal: Readiness for Transition of Care  Outcome: Ongoing, Not Progressing  Intervention: Mutually Develop Transition Plan  Description: Identify available resources for support (e.g., family, friends, community).  Identify and address barriers to ongoing treatment and home management (e.g., environmental, financial).  Provide opportunities to practice self-management skills.  Assess and monitor emotional readiness for transition.  Establish or reconnect linkage with outpatient providers or community-based services.  Recent Flowsheet Documentation  Taken 4/9/2024 0910 by Caroline Wills, RN  Equipment Currently Used at Home: grab bar     Problem: Behavioral Health Comorbidity  Goal: Maintenance of Behavioral Health Symptom Control  Outcome: Ongoing, Not Progressing  Intervention: Maintain Behavioral Health Symptom Control  Description: Confirm mental health diagnosis and current treatment.  Evaluate adherence to previously identified self-management plan.  Advocate continuation of home strategies, including medication.  Evaluate effectiveness of self-management strategies and coping skills.  Communicate with providers to ensure continuity and follow-up at transition.  Recent Flowsheet Documentation  Taken 4/9/2024 0900 by Caroline Wills, RN  Medication Review/Management: medications reviewed     Problem: Diabetes Comorbidity  Goal: Blood Glucose Level Within Targeted Range  Outcome: Ongoing, Not Progressing     Problem:  Adjustment to Illness (Sepsis/Septic Shock)  Goal: Optimal Coping  Outcome: Ongoing, Not Progressing  Intervention: Optimize Psychosocial Adjustment to Illness  Description: Acknowledge, normalize, validate intensity and complexity of patient and support system response to situation.  Provide opportunity for expression of thoughts, feelings and concerns; respond with compassion and reassurance.  Decrease stress and anxiety by providing information about patient’s status and treatment.  Facilitate support system presence and participation in care; consider providing a diary in intensive care situation.  Support coping by recognizing current coping strategies; provide aid in developing new strategies.  Acknowledge and normalize difficulty in managing life-long lifestyle changes and expectations.  Assess and monitor for signs and symptoms of psychologic distress, anxiety and depression.  Consider palliative care consult for goals of care conversation, if the condition is worsening despite treatment.  Recent Flowsheet Documentation  Taken 4/9/2024 0900 by Caroline Wills RN  Supportive Measures:   active listening utilized   positive reinforcement provided   verbalization of feelings encouraged     Problem: Bleeding (Sepsis/Septic Shock)  Goal: Absence of Bleeding  Outcome: Ongoing, Not Progressing     Problem: Glycemic Control Impaired (Sepsis/Septic Shock)  Goal: Blood Glucose Level Within Desired Range  Outcome: Ongoing, Not Progressing     Problem: Infection Progression (Sepsis/Septic Shock)  Goal: Absence of Infection Signs and Symptoms  Outcome: Ongoing, Not Progressing  Intervention: Initiate Sepsis Management  Description: Provide fluid therapy, such as crystalloid or albumin, to increase intravascular volume, organ perfusion and oxygen delivery.  Provide respiratory support, such as oxygen therapy, noninvasive or invasive positive pressure ventilation, to achieve oxygenation and ventilation goal; avoid  hyperoxemia.  Obtain cultures prior to initiating antimicrobial therapy when possible. Do not delay for laboratory results in the presence of high suspicion or clinical indicators.  Administer intravenous broad-spectrum antimicrobial therapy promptly.  Implement hemodynamic monitoring to guide intravascular support based on individual targeted parameters.  Determine and address underlying source of infection aggressively; implement transmission-based precautions and isolation, as indicated.  Recent Flowsheet Documentation  Taken 4/9/2024 0900 by Caroline Wills RN  Infection Prevention:   environmental surveillance performed   equipment surfaces disinfected   hand hygiene promoted   single patient room provided  Intervention: Promote Recovery  Description: Encourage pulmonary hygiene, such as cough-enhancement and airway-clearance techniques, that may include use of incentive spirometry, deep breathing and cough.  Encourage early rehabilitation and physical activity to optimize functional ability and activity tolerance, as well as minimize delirium.  Promote energy conservation; minimize oxygen demand and consumption by adjusting environment, decreasing stimulation, maintaining normothermia and treating pain.  Optimize fluid balance, nutrition intake, sleep and glycemic control to maintain tissue perfusion and enhance immune response.  Recent Flowsheet Documentation  Taken 4/9/2024 0900 by Caroline Wills, RN  Airway/Ventilation Support: pulmonary hygiene promoted     Problem: Nutrition Impaired (Sepsis/Septic Shock)  Goal: Optimal Nutrition Intake  Outcome: Ongoing, Not Progressing     Problem: Pain Acute  Goal: Acceptable Pain Control and Functional Ability  Outcome: Ongoing, Not Progressing  Intervention: Prevent or Manage Pain  Description: Evaluate pain level, effect of treatment and patient response at regular intervals.  Minimize painful stimuli; coordinate care and adjust environment (e.g., light, noise,  unnecessary movement); promote sleep/rest.  Match pharmacologic analgesia to severity and type of pain mechanism (e.g., neuropathic, muscle, inflammatory); consider multimodal approach (e.g., nonopioid, opioid, adjuvant).  Provide medication at regular intervals; titrate to patient response; premedicate for painful procedures.  Manage breakthrough pain with additional doses; consider rotation or switching medication.  Monitor for signs of substance tolerance (increased dose to reach desired effect, decreased effect with same dose).  Manage medication-induced effects, such as constipation, nausea, pruritus, urinary retention, somnolence and dizziness.  Provide multimodal interventions, such as as physical activity, therapeutic exercise, yoga, TENS (transcutaneous electrical nerve stimulation) and manual therapy.  Train in functional activity modifications, such as body mechanics, posture, ergonomics, energy conservation and activity pacing.  Consider addition of complementary or alternative therapy, such as acupuncture, hypnosis or therapeutic touch.  Recent Flowsheet Documentation  Taken 4/9/2024 0900 by Caroline Wills RN  Medication Review/Management: medications reviewed  Intervention: Develop Pain Management Plan  Description: Acknowledge patient as the expert in pain self-management.  Use a consistent, validated tool for pain assessment; include function and quality of life.  Evaluate risk for opioid use and dependence.  Set pain management goals; determine acceptable level of discomfort to allow for maximal functioning.  Determine [id number] pain management plan, including both pharmacologic and nonpharmacologic measures; integrate management of chronic (persistent) pain.  Identify and integrate past successful treatment measures, if able.  Encourage patient and caregiver involvement in pain assessment, interventions and safety measures.  Re-evaluate plan regularly.  Recent Flowsheet  Documentation  Taken 4/9/2024 0900 by Caroline Wills, RN  Pain Management Interventions:   care clustered   pillow support provided   quiet environment facilitated  Intervention: Optimize Psychosocial Wellbeing  Description: Facilitate patient’s self-control over pain by providing pain information and allowing choices in treatment.  Consider and address emotional response to pain.  Explore and promote use of coping strategies; address barriers to successful coping.  Evaluate and assist with psychosocial, cultural and spiritual factors impacting pain.  Modify pain perception using techniques, such as distraction, mindfulness, guided imagery, meditation or music.  Assess for risk factors for developing chronic pain, such as depression, fear, pain avoidance and pain catastrophizing.  Consider referral for ongoing coping support, such as education, relaxation training and role of thoughts.  Recent Flowsheet Documentation  Taken 4/9/2024 0900 by Caroline Wills RN  Supportive Measures:   active listening utilized   positive reinforcement provided   verbalization of feelings encouraged     Problem: Fall Injury Risk  Goal: Absence of Fall and Fall-Related Injury  Outcome: Ongoing, Not Progressing  Intervention: Identify and Manage Contributors  Description: Develop a fall prevention plan with the patient and caregiver/family.  Provide reorientation, appropriate sensory stimulation and routines with changes in mental status to decrease risk of fall.  Promote use of personal vision and auditory aids.  Assess assistance level required for safe and effective self-care; provide support as needed, such as toileting, mobilization. For age 65 and older, implement timed toileting with assistance.  Encourage physical activity, such as performance of mobility and self-care at highest level of patient ability, multicomponent exercise program and provision of appropriate assistive devices.  If fall occurs, assess the severity of  injury; implement fall injury protocol. Determine the cause and revise fall injury prevention plan.  Regularly review medication contribution to fall risk; adjust medication administration times to minimize risk of falling.  Consider risk related to polypharmacy and age.  Balance adequate pain management with potential for oversedation.  Recent Flowsheet Documentation  Taken 4/9/2024 0900 by Caroline Wills, RN  Medication Review/Management: medications reviewed  Intervention: Promote Injury-Free Environment  Description: Provide a safe, barrier-free environment that encourages independent activity.  Keep care area uncluttered and well-lighted.  Determine need for increased observation or monitoring.  Avoid use of devices that minimize mobility, such as restraints or indwelling urinary catheter.  Recent Flowsheet Documentation  Taken 4/9/2024 0900 by Caroline Wills RN  Safety Promotion/Fall Prevention:   assistive device/personal items within reach   clutter free environment maintained   fall prevention program maintained   nonskid shoes/slippers when out of bed   room organization consistent   safety round/check completed     Problem: Impaired Wound Healing  Goal: Optimal Wound Healing  Outcome: Ongoing, Not Progressing  Intervention: Promote Wound Healing  Description: Assess and monitor wound for signs of impaired healing (e.g., drainage or purulent exudate, absence of healing ridge, prolonged inflammatory response).  Optimize fluids, nutritional intake, sleep/rest and glycemic control to enhance healing.  Consider oxygen therapy in the presence of hypoxia to enhance tissue oxygenation.  Position to avoid tension or pressure on wound surface.  Manage edema (e.g., elevate extremities) and maintain blood pressure to optimize tissue perfusion.  Provide nonpharmacologic and pharmacologic comfort measures to manage pain and minimize vasoconstriction; premedicate for painful procedures.  Provide aseptic wound care to  reduce risk of infection.  Maintain sterile and occlusive dressing, if prescribed; minimize dressing changes to decrease infection risk and trauma to the wound bed.  Maintain moist wound bed and consistent wound temperature for optimal healing environment.  Manage bleeding, drainage and exudate to protect periwound tissue; ensure patency of drainage devices.  Provide topical treatments and antimicrobial therapy to prevent or treat infection.  Evaluate and address associated functional limitations, such as mobility and ADL (activities of daily living).  Consider compression bandage or stocking for venous ulcer.  Utilize adjunctive interventions when indicated (e.g., negative-pressure wound therapy, debridement).  Recent Flowsheet Documentation  Taken 4/9/2024 0900 by Caroline Wills RN  Pain Management Interventions:   care clustered   pillow support provided   quiet environment facilitated   Goal Outcome Evaluation:  Plan of Care Reviewed With: patient        Progress: improving  Outcome Evaluation: Alert and oriented x 4. [place]. Pleasant and cooperative. Discussing hallucinations. Bed alarm for safety. Agrees to call for SBA. Urinal in reach. O2 at 2 LPM NC. Admission complete. CHG bath, clean gown and socks applied. Informed consent signed for PD catheter removal by Dr Berry today. NPO. Chhayaebrirodolfo @ 98.3 after Tylenol in ED. Sinus on monitor. Monitoring. Daughter called for update, updated.

## 2024-04-09 NOTE — ED NOTES
Nursing report ED to floor  Nelson Wang  79 y.o.  male    HPI :  HPI (Adult)  Stated Reason for Visit: possible sepsis from catheter. Yeast present at this time.  History Obtained From: patient, family    Patient presents for evaluation of acute fever and hallucinations. He reportedly had his PD fluid checked 3 days ago and they were notified today that it came back positive for yeast. He was supposed to see Dr. Berry tomorrow to have his PD catheter removed and his nephrologist also ordered antibiotics he had his PD catheter that been administered earlier today. Patient states that he has some mild upper abdominal pain currently.     Chief Complaint  Chief Complaint   Patient presents with    Fever    Hallucinations       Admitting doctor:   Shiraz Bolton MD    Admitting diagnosis:   The primary encounter diagnosis was Sepsis, due to unspecified organism, unspecified whether acute organ dysfunction present. Diagnoses of SBP (spontaneous bacterial peritonitis), Fungal infection, and ESRD on peritoneal dialysis were also pertinent to this visit.    Code status:   Current Code Status       Date Active Code Status Order ID Comments User Context       4/9/2024 0155 CPR (Attempt to Resuscitate) [id number]  Germania Chow, [facility]        Question Answer    Code Status (Patient has no pulse and is not breathing) CPR (Attempt to Resuscitate)    Medical Interventions (Patient has pulse or is breathing) Full Support                    Allergies:   Patient has no known allergies.    Isolation:   No active isolations    Intake and Output    Intake/Output Summary (Last 24 hours) at 4/9/2024 0656  Last data filed at 4/9/2024 0352  Gross per 24 hour   Intake 300 ml   Output --   Net 300 ml       Weight:       04/08/24 2128   Weight: 78 kg (171 lb 15.3 oz)       Most recent vitals:   Vitals:    04/09/24 0433 04/09/24 0434 04/09/24 0435 04/09/24 0436   BP:       Pulse: 88 88 87 87   Resp:       Temp:       TempSrc:        SpO2: 93% 94% 92% 93%   Weight:       Height:           Active LDAs/IV Access:   Lines, Drains & Airways       Active LDAs       Name Placement date Placement time Site Days    Peripheral IV 04/08/24 2130 Anterior;Right Forearm 04/08/24 2130  Forearm  less than 1    Peripheral IV 04/08/24 2214 Anterior;Left Forearm 04/08/24 2214  Forearm  less than 1    Peritoneal Dialysis Catheter 03/27/23  --  --  379                    Labs (abnormal labs have a star):   Labs Reviewed   COMPREHENSIVE METABOLIC PANEL - Abnormal; Notable for the following components:       Result Value    Glucose 122 (*)     BUN 46 (*)     Creatinine 6.31 (*)     Sodium 134 (*)     Chloride 95 (*)     Calcium 8.3 (*)     Albumin 2.8 (*)     eGFR 8.4 (*)     All other components within normal limits    Narrative:     GFR Normal >60  Chronic Kidney Disease <60  Kidney Failure <15    The GFR formula is only valid for adults with stable renal function between ages 18 and 70.   CBC WITH AUTO DIFFERENTIAL - Abnormal; Notable for the following components:    RBC 2.94 (*)     Hemoglobin 8.6 (*)     Hematocrit 27.5 (*)     MCHC 31.3 (*)     Platelets 128 (*)     Lymphocyte % 14.6 (*)     Immature Grans % 0.8 (*)     Neutrophils, Absolute 7.41 (*)     Monocytes, Absolute 0.93 (*)     Immature Grans, Absolute 0.08 (*)     All other components within normal limits   URINALYSIS W/ MICROSCOPIC IF INDICATED (NO CULTURE) - Abnormal; Notable for the following components:    Blood, UA Small (1+) (*)     Protein,  mg/dL (2+) (*)     All other components within normal limits   PROCALCITONIN - Abnormal; Notable for the following components:    Procalcitonin 1.25 (*)     All other components within normal limits    Narrative:     As a Marker for Sepsis (Non-Neonates):    1. <0.5 ng/mL represents a low risk of severe sepsis and/or septic shock.  2. >2 ng/mL represents a high risk of severe sepsis and/or septic shock.    As a Marker for Lower Respiratory Tract  "Infections that require antibiotic therapy:    PCT on Admission    Antibiotic Therapy       6-12 Hrs later    >0.5                Strongly Recommended  >0.25 - <0.5        Recommended   0.1 - 0.25          Discouraged              Remeasure/reassess PCT  <0.1                Strongly Discouraged     Remeasure/reassess PCT    As 28 day mortality risk marker: \"Change in Procalcitonin Result\" (>80% or <=80%) if Day 0 (or Day 1) and Day 4 values are available. Refer to http://[email or web address]-pct-[email or web address]    Change in PCT <=80%  A decrease of PCT levels below or equal to 80% defines a positive change in PCT test result representing a higher risk for 28-day all-cause mortality of patients diagnosed with severe sepsis for septic shock.    Change in PCT >80%  A decrease of PCT levels of more than 80% defines a negative change in PCT result representing a lower risk for 28-day all-cause mortality of patients diagnosed with severe sepsis or septic shock.      RESPIRATORY PANEL PCR W/ COVID-19 (SARS-COV-2), NP SWAB IN UTM/VTP, 2 HR TAT - Normal    Narrative:     In the setting of a positive respiratory panel with a viral infection PLUS a negative procalcitonin without other underlying concern for bacterial infection, consider observing off antibiotics or discontinuation of antibiotics and continue supportive care. If the respiratory panel is positive for atypical bacterial infection (Bordetella pertussis, Chlamydophila pneumoniae, or Mycoplasma pneumoniae), consider antibiotic de-escalation to target atypical bacterial infection.   LACTIC ACID, PLASMA - Normal   BLOOD CULTURE   BLOOD CULTURE   URINALYSIS, MICROSCOPIC ONLY   RENAL FUNCTION PANEL   CBC (NO DIFF)   VANCOMYCIN, RANDOM   CBC AND DIFFERENTIAL    Narrative:     The following orders were created for panel order CBC & Differential.  Procedure                               Abnormality         Status                     ---------                               ----------- "         ------                     CBC Auto Differential[[id number]]        Abnormal            Final result                 Please view results for these tests on the individual orders.       EKG:   No orders to display       Meds given in ED:   Medications   sodium chloride 0.9 % flush 10 mL (has no administration in time range)   sodium chloride 0.9 % flush 10 mL (has no administration in time range)   nitroglycerin (NITROSTAT) SL tablet 0.4 mg (has no administration in time range)   acetaminophen (TYLENOL) tablet 650 mg (has no administration in time range)   sennosides-docusate (PERICOLACE) 8.6-50 MG per tablet 2 tablet (has no administration in time range)     And   polyethylene glycol (MIRALAX) packet 17 g (has no administration in time range)     And   bisacodyl (DULCOLAX) EC tablet 5 mg (has no administration in time range)     And   bisacodyl (DULCOLAX) suppository 10 mg (has no administration in time range)   melatonin tablet 3 mg (has no administration in time range)   ondansetron ODT (ZOFRAN-ODT) disintegrating tablet 4 mg (has no administration in time range)     Or   ondansetron (ZOFRAN) injection 4 mg (has no administration in time range)   aluminum-magnesium hydroxide-simethicone (MAALOX MAX) 400-400-40 MG/5ML suspension 15 mL (has no administration in time range)   Pharmacy to dose vancomycin (has no administration in time range)   cefepime 2000 mg IVPB in 100 mL NS (MBP) (has no administration in time range)   Vancomycin Pharmacy Intermittent/Pulse Dosing (has no administration in time range)   iopamidol (ISOVUE-300) 61 % injection 100 mL (85 mL Intravenous Given by Other 4/8/24 2250)   vancomycin (VANCOCIN) 1,000 mg in sodium chloride 0.9 % 250 mL IVPB-VTB (0 mg Intravenous Stopped 4/9/24 0157)   cefepime 1000 mg IVPB in 100 mL NS (MBP) (0 mg Intravenous Stopped 4/9/24 0052)   fluconazole (DIFLUCAN) IVPB 400 mg (0 mg Intravenous Stopped 4/9/24 0352)   OLANZapine (zyPREXA) injection 5 mg (5 mg  Intramuscular Given 24 0548)       Imaging results:  CT Abdomen Pelvis With Contrast    Addendum Date: 2024    ADDENDUM: 24 03:03 Call From Hospital  on  02:53 (-04:00) Electronically signed by Wendy Payton DO [employer] Board of     Addendum Date: 2024    ADDENDUM: 24 00:31 Call Doctor Regarding Above results, called LILIA Azul on  00:29 (-04:00) Electronically signed by Wendy Payton DO [employer] Board of     Result Date: 2024  Communications:  Call Doctor Above results Electronically signed by Wendy Payton DO [employer] on 24 at 0011    CT Head Without Contrast    Result Date: 2024  Electronically signed by Celia Hatfield MD on 24 at 2336    XR Chest 1 View    Result Date: 2024  No interval change or evidence for active disease in the chest.  This report was finalized on 2024 11:00 PM by Dr. Margarito Kincaid M.D on Workstation: [id number]       Ambulatory status:   - bedrest    Social issues:   Social History     Socioeconomic History    Marital status:    Tobacco Use    Smoking status: Former     Current packs/day: 0.00     Average packs/day: 1 pack/day for 10.0 years (10.0 ttl pk-yrs)     Types: Cigarettes     Start date:      Quit date: 2000     Years since quittin.2     Passive exposure: Past    Smokeless tobacco: Never    Tobacco comments:     quit 25 years ago, chews nicotine gum   Vaping Use    Vaping status: Never Used   Substance and Sexual Activity    Alcohol use: Yes     Comment: 1 DRINK/DAY    Drug use: Never    Sexual activity: Defer       Peripheral Neurovascular  Peripheral Neurovascular (Adult)  Peripheral Neurovascular WDL: WDL    Neuro Cognitive  Neuro Cognitive (Adult)  Cognitive/Neuro/Behavioral WDL: WDL    Learning  Learning Assessment (Adult)  Learning Readiness and Ability: no barriers identified, cognitive limitation noted  Individualized  Teaching Method: Pts daughter stated he was paving visual/auditory hallucinations earlier today, not observed by primary RN upon assessment    Respiratory  Respiratory (Adult)  Airway WDL: WDL  Respiratory WDL  Respiratory WDL: WDL    Abdominal Pain       Pain Assessments  Pain (Adult)  (0-10) Pain Rating: Rest: 5      Lesley Bah RN  04/09/24 06:56 EDT

## 2024-04-09 NOTE — ED NOTES
Pt arrived via pv from home w/ c/o auditory & visual hallucinations w/ a 101.4 temp x2 daYS. Per daughter pt had 1000mg tylenol; approx. X1 hour ago. Pts daughter reports pt has a peritoneal catheter that's scheduled by Dr. Berry. to be removed tomorrow due to growing yeast. Pts daughter is a hospitalist @ [employer] & reports he has a history of turning septic.

## 2024-04-09 NOTE — OP NOTE
PREOPERATIVE DIAGNOSIS:  Peritoneal dialysis catheter infection, peritonitis  POSTOPERATIVE DIAGNOSIS:  Same  PROCEDURE:  Open removal of peritoneal dialysis catheter  SURGEON/STAFF:  DARREL Berry  Assistant: Greg Muro MD was in charge for retraction, exposure, closing wounds and placing dressings that was essential for success of the case  ANESTHESIA: MAC  BLOOD LOSS: Minimal  COUNTS:  Needle and sponge count correct.  SPECIMENS:  PD Catheter, not sent for pathology analysis    INDICATIONS FOR OPERATION:  The patient is a 79-year-old male with ESRD on dialysis that has been having fever for 1 week.  He was found to have peritoneal dialysis catheter infection.. The patient needs the catheter removed and he will be switched to hemodialysis.  On physical exam, the pd catheter is well incorporated and there're no clear signs of infection.  I recommend that the patient undergoes peritoneal dialysis removal under  moderate sedation and local anesthesia. The risks and benefits of the procedure were discussed at length and in detail with the patient that verbalized understanding and agreed with the plan.    OPERATION: The patient was arranged operative room and under moderate sedation because he had recently undergone right subclavian dialysis catheter placement.  The patient's abdomen was prepped and draped in the usual sterile fashion. The peritoneal dialysis catheter was prepped twice.      At this time, half percent Marcaine with epinephrine was injected at the left lateral aspect of the umbilicus.  With the scalpel a 2 cm incision was performed .  Dissection was carried onto the subcutaneous tissue and muscular fascia. The distal cuff was found to be below the anterior rectus sheath. This was dissected circumferentially from surrounding tissue. The distal curl end of the catheter was then removed from the patient's abdomen. The fascial defect was closed with a figure of eight of 0 vicryl. The second cuff was  dissected from the subcutaneous tissue.  The catheter was still attached and there was evidence of at least 2 other cuffs.  One of the cuff was superficial close to the exit site and this was dissected circumferentially enlarge and the exit site.  There was a detaining connector that was seen and there was another cuff in between the exit site and the left periumbilical incision.  An incision was performed with scalpel after half percent Marcaine with epinephrine was injected.  Dissection was carried down to subcutaneous tissue.  I found the titanium connector and I dissected the remaining subcutaneous  catheter cuff from all attachments.  The catheter was removed completely.  Catheter was not sent for pathological analysis.  The left periumbilical and left epigastric incisions were closed in 2 layers with interrupted 3-0 Vicryl and a running 4 Monocryl.  The wounds were covered with Steri-Strips 4 x 4 and Tegaderm.  The catheter exit site was covered with 4 x 4 and Tegaderm.  The patient was awakened in the operating room where he was taken to recovery in stable condition. Instrument sponge count were correct.    Jose Berry MD, FACS  General, Minimally Invasive and Endoscopic Surgery  [facility], [facility]    [address] [place] [address], [address]  [place], [place], [address]  P: [phone]  F: [phone]

## 2024-04-10 LAB
ALBUMIN SERPL-MCNC: 2.3 G/DL (ref 3.5–5.2)
ALBUMIN/GLOB SERPL: 0.7 G/DL
ALP SERPL-CCNC: 53 U/L (ref 39–117)
ALT SERPL W P-5'-P-CCNC: 9 U/L (ref 1–41)
ANION GAP SERPL CALCULATED.3IONS-SCNC: 15.1 MMOL/L (ref 5–15)
AST SERPL-CCNC: 22 U/L (ref 1–40)
BASOPHILS # BLD AUTO: 0.01 10*3/MM3 (ref 0–0.2)
BASOPHILS NFR BLD AUTO: 0.2 % (ref 0–1.5)
BILIRUB SERPL-MCNC: 0.3 MG/DL (ref 0–1.2)
BUN SERPL-MCNC: 62 MG/DL (ref 8–23)
BUN/CREAT SERPL: 7.9 (ref 7–25)
CALCIUM SPEC-SCNC: 8.1 MG/DL (ref 8.6–10.5)
CHLORIDE SERPL-SCNC: 100 MMOL/L (ref 98–107)
CO2 SERPL-SCNC: 21.9 MMOL/L (ref 22–29)
CREAT SERPL-MCNC: 7.81 MG/DL (ref 0.76–1.27)
DEPRECATED RDW RBC AUTO: 46.8 FL (ref 37–54)
EGFRCR SERPLBLD CKD-EPI 2021: 6.5 ML/MIN/1.73
EOSINOPHIL # BLD AUTO: 0.53 10*3/MM3 (ref 0–0.4)
EOSINOPHIL NFR BLD AUTO: 8.2 % (ref 0.3–6.2)
ERYTHROCYTE [DISTWIDTH] IN BLOOD BY AUTOMATED COUNT: 14 % (ref 12.3–15.4)
GLOBULIN UR ELPH-MCNC: 3.1 GM/DL
GLUCOSE BLDC GLUCOMTR-MCNC: 134 MG/DL (ref 70–130)
GLUCOSE BLDC GLUCOMTR-MCNC: 227 MG/DL (ref 70–130)
GLUCOSE SERPL-MCNC: 85 MG/DL (ref 65–99)
HAV IGM SERPL QL IA: NORMAL
HBA1C MFR BLD: 6.6 % (ref 4.8–5.6)
HBV CORE IGM SERPL QL IA: NORMAL
HBV SURFACE AG SERPL QL IA: NORMAL
HCT VFR BLD AUTO: 24 % (ref 37.5–51)
HCV AB SER DONR QL: NORMAL
HGB BLD-MCNC: 7.6 G/DL (ref 13–17.7)
IMM GRANULOCYTES # BLD AUTO: 0.05 10*3/MM3 (ref 0–0.05)
IMM GRANULOCYTES NFR BLD AUTO: 0.8 % (ref 0–0.5)
LYMPHOCYTES # BLD AUTO: 1.07 10*3/MM3 (ref 0.7–3.1)
LYMPHOCYTES NFR BLD AUTO: 16.5 % (ref 19.6–45.3)
MAGNESIUM SERPL-MCNC: 1.4 MG/DL (ref 1.6–2.4)
MCH RBC QN AUTO: 29.1 PG (ref 26.6–33)
MCHC RBC AUTO-ENTMCNC: 31.7 G/DL (ref 31.5–35.7)
MCV RBC AUTO: 92 FL (ref 79–97)
MONOCYTES # BLD AUTO: 0.79 10*3/MM3 (ref 0.1–0.9)
MONOCYTES NFR BLD AUTO: 12.2 % (ref 5–12)
NEUTROPHILS NFR BLD AUTO: 4.02 10*3/MM3 (ref 1.7–7)
NEUTROPHILS NFR BLD AUTO: 62.1 % (ref 42.7–76)
NRBC BLD AUTO-RTO: 0 /100 WBC (ref 0–0.2)
PHOSPHATE SERPL-MCNC: 7.2 MG/DL (ref 2.5–4.5)
PLATELET # BLD AUTO: 136 10*3/MM3 (ref 140–450)
PMV BLD AUTO: 10.6 FL (ref 6–12)
POTASSIUM SERPL-SCNC: 4.1 MMOL/L (ref 3.5–5.2)
PROT SERPL-MCNC: 5.4 G/DL (ref 6–8.5)
RBC # BLD AUTO: 2.61 10*6/MM3 (ref 4.14–5.8)
SODIUM SERPL-SCNC: 137 MMOL/L (ref 136–145)
T4 FREE SERPL-MCNC: 1.16 NG/DL (ref 0.93–1.7)
TSH SERPL DL<=0.05 MIU/L-ACNC: 19.4 UIU/ML (ref 0.27–4.2)
WBC NRBC COR # BLD AUTO: 6.47 10*3/MM3 (ref 3.4–10.8)

## 2024-04-10 PROCEDURE — 85025 COMPLETE CBC W/AUTO DIFF WBC: CPT | Performed by: SURGERY

## 2024-04-10 PROCEDURE — 83735 ASSAY OF MAGNESIUM: CPT | Performed by: SURGERY

## 2024-04-10 PROCEDURE — 99233 SBSQ HOSP IP/OBS HIGH 50: CPT | Performed by: INTERNAL MEDICINE

## 2024-04-10 PROCEDURE — 99231 SBSQ HOSP IP/OBS SF/LOW 25: CPT | Performed by: SURGERY

## 2024-04-10 PROCEDURE — 94799 UNLISTED PULMONARY SVC/PX: CPT

## 2024-04-10 PROCEDURE — 63710000001 INSULIN LISPRO (HUMAN) PER 5 UNITS: Performed by: SURGERY

## 2024-04-10 PROCEDURE — 63710000001 INSULIN GLARGINE PER 5 UNITS: Performed by: SURGERY

## 2024-04-10 PROCEDURE — 82948 REAGENT STRIP/BLOOD GLUCOSE: CPT

## 2024-04-10 PROCEDURE — 25010000002 MICAFUNGIN SODIUM 100 MG RECONSTITUTED SOLUTION 1 EACH VIAL: Performed by: SURGERY

## 2024-04-10 PROCEDURE — 83036 HEMOGLOBIN GLYCOSYLATED A1C: CPT | Performed by: SURGERY

## 2024-04-10 PROCEDURE — 84443 ASSAY THYROID STIM HORMONE: CPT | Performed by: SURGERY

## 2024-04-10 PROCEDURE — 25010000002 MAGNESIUM SULFATE 2 GM/50ML SOLUTION: Performed by: HOSPITALIST

## 2024-04-10 PROCEDURE — 84100 ASSAY OF PHOSPHORUS: CPT | Performed by: SURGERY

## 2024-04-10 PROCEDURE — 80053 COMPREHEN METABOLIC PANEL: CPT | Performed by: SURGERY

## 2024-04-10 PROCEDURE — 25010000002 HEPARIN (PORCINE) PER 1000 UNITS: Performed by: HOSPITALIST

## 2024-04-10 PROCEDURE — 84439 ASSAY OF FREE THYROXINE: CPT | Performed by: SURGERY

## 2024-04-10 PROCEDURE — 5A1D70Z PERFORMANCE OF URINARY FILTRATION, INTERMITTENT, LESS THAN 6 HOURS PER DAY: ICD-10-PCS | Performed by: HOSPITALIST

## 2024-04-10 PROCEDURE — 25810000003 SODIUM CHLORIDE 0.9 % SOLUTION: Performed by: NURSE PRACTITIONER

## 2024-04-10 PROCEDURE — 80074 ACUTE HEPATITIS PANEL: CPT | Performed by: INTERNAL MEDICINE

## 2024-04-10 PROCEDURE — 94664 DEMO&/EVAL PT USE INHALER: CPT

## 2024-04-10 RX ORDER — MAGNESIUM SULFATE HEPTAHYDRATE 40 MG/ML
2 INJECTION, SOLUTION INTRAVENOUS ONCE
Status: COMPLETED | OUTPATIENT
Start: 2024-04-10 | End: 2024-04-10

## 2024-04-10 RX ORDER — HEPARIN SODIUM 1000 [USP'U]/ML
2600 INJECTION, SOLUTION INTRAVENOUS; SUBCUTANEOUS ONCE
Status: COMPLETED | OUTPATIENT
Start: 2024-04-10 | End: 2024-04-10

## 2024-04-10 RX ORDER — SEVELAMER CARBONATE 800 MG/1
800 TABLET, FILM COATED ORAL
Status: DISCONTINUED | OUTPATIENT
Start: 2024-04-10 | End: 2024-04-14 | Stop reason: HOSPADM

## 2024-04-10 RX ADMIN — PANTOPRAZOLE SODIUM 40 MG: 40 TABLET, DELAYED RELEASE ORAL at 07:56

## 2024-04-10 RX ADMIN — ARIPIPRAZOLE 2 MG: 2 TABLET ORAL at 09:41

## 2024-04-10 RX ADMIN — Medication 250 MG: at 09:40

## 2024-04-10 RX ADMIN — LEVOTHYROXINE SODIUM 150 MCG: 150 TABLET ORAL at 07:56

## 2024-04-10 RX ADMIN — Medication 3 MG: at 20:54

## 2024-04-10 RX ADMIN — MAGNESIUM SULFATE HEPTAHYDRATE 2 G: 40 INJECTION, SOLUTION INTRAVENOUS at 12:43

## 2024-04-10 RX ADMIN — TRAMADOL HYDROCHLORIDE 50 MG: 50 TABLET ORAL at 15:36

## 2024-04-10 RX ADMIN — ARIPIPRAZOLE 2 MG: 2 TABLET ORAL at 20:55

## 2024-04-10 RX ADMIN — ARFORMOTEROL TARTRATE 15 MCG: 15 SOLUTION RESPIRATORY (INHALATION) at 19:53

## 2024-04-10 RX ADMIN — INSULIN GLARGINE 10 UNITS: 100 INJECTION, SOLUTION SUBCUTANEOUS at 21:04

## 2024-04-10 RX ADMIN — Medication 250 MG: at 20:54

## 2024-04-10 RX ADMIN — ATORVASTATIN CALCIUM 40 MG: 20 TABLET, FILM COATED ORAL at 09:40

## 2024-04-10 RX ADMIN — ARFORMOTEROL TARTRATE 15 MCG: 15 SOLUTION RESPIRATORY (INHALATION) at 10:38

## 2024-04-10 RX ADMIN — SEVELAMER CARBONATE 800 MG: 800 TABLET, FILM COATED ORAL at 18:38

## 2024-04-10 RX ADMIN — HEPARIN SODIUM 2600 UNITS: 1000 INJECTION INTRAVENOUS; SUBCUTANEOUS at 16:04

## 2024-04-10 RX ADMIN — CARVEDILOL 6.25 MG: 6.25 TABLET, FILM COATED ORAL at 20:55

## 2024-04-10 RX ADMIN — SEVELAMER CARBONATE 800 MG: 800 TABLET, FILM COATED ORAL at 11:32

## 2024-04-10 RX ADMIN — INSULIN LISPRO 3 UNITS: 100 INJECTION, SOLUTION INTRAVENOUS; SUBCUTANEOUS at 21:04

## 2024-04-10 RX ADMIN — SODIUM CHLORIDE 250 ML: 9 INJECTION, SOLUTION INTRAVENOUS at 00:18

## 2024-04-10 RX ADMIN — MICAFUNGIN 100 MG: 20 INJECTION, POWDER, LYOPHILIZED, FOR SOLUTION INTRAVENOUS at 20:55

## 2024-04-10 NOTE — PROGRESS NOTES
Face-to-face evaluation performed secondary to the initiation of violent restraints.  Patient is status post nontunneled right internal jugular transvenous hemodialysis catheter placement tonight by vascular.  Postprocedure, patient became very violent and aggressive with staff in addition to pulling at his lines/tubes.  Prior to assessment, he has received 1 mg of Ativan.  He is currently sleeping and resting comfortably in bed.  Vital signs are stable.  He is not in any acute distress.  He is chronically ill-appearing.  Normal rate and rhythm with normal pulses.  Pulmonary effort is normal, no respiratory distress.  Skin is warm and dry, vitiligo present.  Sitter is currently at bedside.  Will continue restraints for now and de-escalate when possible.

## 2024-04-10 NOTE — PROGRESS NOTES
"  Nephrology Progress note - Chart review only     Patient underwent PD catheter removal and placement of TDC     /62   Pulse 79   Temp 97.6 °F (36.4 °C) (Oral)   Resp 20   Ht 170.2 cm (67\")   Wt 78 kg (171 lb 15.3 oz)   SpO2 100%   BMI 26.93 kg/m²     Laboratories     Results from last 7 days   Lab Units 04/09/24  0653 04/08/24  2140   SODIUM mmol/L 135* 134*   POTASSIUM mmol/L 3.9 4.2   CHLORIDE mmol/L 100 95*   CO2 mmol/L 22.0 24.3   BUN mg/dL 42* 46*   CREATININE mg/dL 6.66* 6.31*   CALCIUM mg/dL 7.4* 8.3*   BILIRUBIN mg/dL  --  0.4   ALK PHOS U/L  --  55   ALT (SGPT) U/L  --  10   AST (SGOT) U/L  --  19   GLUCOSE mg/dL 81 122*       Plan   - Will arrange for HD tomorrow  short gentle session   - No acute indication for HD today   - Discussed with nursing staff   "

## 2024-04-10 NOTE — SIGNIFICANT NOTE
04/10/24 1351   OTHER   Discipline physical therapist   Rehab Time/Intention   Session Not Performed patient unavailable for evaluation  (pt currently on dialysis, PT to follow up tomorrow)   Recommendation   PT - Next Appointment 04/11/24

## 2024-04-10 NOTE — PROGRESS NOTES
Name: Nelson Wang ADMIT: 2024   : 1945  PCP: Janna Mo MD    MRN: [id number] LOS: 1 days   AGE/SEX: 79 y.o. male  ROOM: [facility]     Subjective   Subjective   Feeling okay today. No abd pain. No F/C/NS. No SOA. Voiding fine. Tolerating PO.        Objective   Objective   Vital Signs  Temp:  [97.6 °F (36.4 °C)-99.1 °F (37.3 °C)] 98 °F (36.7 °C)  Heart Rate:  [75-89] 79  Resp:  [16-20] 20  BP: (101-132)/(49-78) 116/62  SpO2:  [88 %-100 %] 100 %  on  Flow (L/min):  [2-4] 2;   Device (Oxygen Therapy): room air  Body mass index is 26.93 kg/m².  Physical Exam  Vitals and nursing note reviewed. Exam conducted with a chaperone present (Sitter).   Constitutional:       General: He is not in acute distress.     Appearance: He is ill-appearing (chronically). He is not toxic-appearing or diaphoretic.   HENT:      Head: Normocephalic.      Nose: Nose normal.      Mouth/Throat:      Mouth: Mucous membranes are moist.      Pharynx: Oropharynx is clear.   Eyes:      General: No scleral icterus.        Right eye: No discharge.         Left eye: No discharge.      Extraocular Movements: Extraocular movements intact.      Conjunctiva/sclera: Conjunctivae normal.   Neck:      Comments: Right IJ central catheter in place  Cardiovascular:      Rate and Rhythm: Normal rate and regular rhythm.      Pulses: Normal pulses.   Pulmonary:      Effort: Pulmonary effort is normal. No respiratory distress.      Breath sounds: Normal breath sounds. No wheezing or rales.   Abdominal:      General: Bowel sounds are normal. There is no distension.      Palpations: Abdomen is soft.      Tenderness: There is abdominal tenderness (mild, diffuse). There is no guarding or rebound.      Comments: PD catheter out now   Musculoskeletal:         General: No swelling or deformity.      Cervical back: Neck supple.      Comments: SCDs in place   Skin:     General: Skin is warm and dry.      Capillary Refill: Capillary refill takes less  than 2 seconds.      Coloration: Skin is not jaundiced.      Comments: Vitiligo   Neurological:      Mental Status: He is alert and oriented to person, place, and time. Mental status is at baseline.      Cranial Nerves: No cranial nerve deficit.      Coordination: Coordination normal.   Psychiatric:         Mood and Affect: Mood normal.         Behavior: Behavior normal.       Results Review     I reviewed the patient's new clinical results.  Results from last 7 days   Lab Units 04/10/24  0553 04/09/24  0653 04/08/24  2140   WBC 10*3/mm3 6.47 8.08 9.96   HEMOGLOBIN g/dL 7.6* 8.1* 8.6*   PLATELETS 10*3/mm3 136* 144 128*     Results from last 7 days   Lab Units 04/10/24  0553 04/09/24  0653 04/08/24  2140   SODIUM mmol/L 137 135* 134*   POTASSIUM mmol/L 4.1 3.9 4.2   CHLORIDE mmol/L 100 100 95*   CO2 mmol/L 21.9* 22.0 24.3   BUN mg/dL 62* 42* 46*   CREATININE mg/dL 7.81* 6.66* 6.31*   GLUCOSE mg/dL 85 81 122*   EGFR mL/min/1.73 6.5* 7.9* 8.4*     Results from last 7 days   Lab Units 04/10/24  0553 04/09/24  0653 04/08/24  2140   ALBUMIN g/dL 2.3* 2.4* 2.8*   BILIRUBIN mg/dL 0.3  --  0.4   ALK PHOS U/L 53  --  55   AST (SGOT) U/L 22  --  19   ALT (SGPT) U/L 9  --  10     Results from last 7 days   Lab Units 04/10/24  0553 04/09/24  0653 04/08/24  2140   CALCIUM mg/dL 8.1* 7.4* 8.3*   ALBUMIN g/dL 2.3* 2.4* 2.8*   MAGNESIUM mg/dL 1.4*  --   --    PHOSPHORUS mg/dL 7.2* 4.1  --      Results from last 7 days   Lab Units 04/08/24  2140   PROCALCITONIN ng/mL 1.25*   LACTATE mmol/L 1.6     Hemoglobin A1C   Date/Time Value Ref Range Status   04/10/2024 0553 6.60 (H) 4.80 - 5.60 % Final     Glucose   Date/Time Value Ref Range Status   04/09/2024 2113 84 70 - 130 mg/dL Final   04/09/2024 1747 91 70 - 130 mg/dL Final   04/09/2024 1139 88 70 - 130 mg/dL Final       XR Chest 1 View    Result Date: 4/9/2024  1. Placement of a right IJ centimeters cath with tip extending to the right atrium. 2. Diminished lung volumes with pulmonary  vascular engorgement. Basilar atelectasis.  No pneumothorax.  This report was finalized on 4/9/2024 7:47 PM by Dr. Margarito Kincaid M.D on Workstation: [id number]      CT Abdomen Pelvis With Contrast    Addendum Date: 4/9/2024    ADDENDUM: 04 09 24 03:03 Call From [facility]  on 04 09 02:53 (-04:00) Electronically signed by Wendy Payton DO [employer] Board of     Addendum Date: 4/9/2024    ADDENDUM: 04 09 24 00:31 Call Doctor Regarding Above results, called LILIA Azul on 04 09 00:29 (-04:00) Electronically signed by Wendy Payton DO [employer] Board of     Result Date: 4/9/2024  Communications:  Call Doctor Above results Electronically signed by Glenda Payton DOomathcetan [employer] on 04-09-24 at 0011    CT Head Without Contrast    Result Date: 4/8/2024  Electronically signed by Celia Hatfield MD on 04-08-24 at 2336    XR Chest 1 View    Result Date: 4/8/2024  No interval change or evidence for active disease in the chest.  This report was finalized on 4/8/2024 11:00 PM by Dr. Margarito Kincaid M.D on Workstation: [id number]       I have personally reviewed all medications:  Scheduled Medications  arformoterol, 15 mcg, Nebulization, BID - RT  ARIPiprazole, 2 mg, Oral, BID  atorvastatin, 40 mg, Oral, Daily  carvedilol, 6.25 mg, Oral, Nightly  insulin glargine, 10 Units, Subcutaneous, Nightly  insulin lispro, 2-7 Units, Subcutaneous, 4x Daily AC & at Bedtime  levothyroxine, 150 mcg, Oral, Q AM  micafungin (MYCAMINE) IV, 100 mg, Intravenous, Q24H  pantoprazole, 40 mg, Oral, Q AM  saccharomyces boulardii, 250 mg, Oral, BID    Infusions   Diet  Diet: Regular/House, Diabetic; Consistent Carbohydrate; Fluid Consistency: Thin (IDDSI 0)    I have personally reviewed:  [x]  Laboratory   [x]  Microbiology   [x]  Radiology   [x]  EKG/Telemetry  []  Cardiology/Vascular   []  Pathology    []  Records       Assessment/Plan     Active Hospital Problems    Diagnosis  POA    Sepsis  [A41.9]  Yes    Type 2 diabetes mellitus, with long-term current use of insulin [E11.9, Z79.4]  Not Applicable    GERD (gastroesophageal reflux disease) [K21.9]  Yes    Immunosuppression due to drug therapy [D84.821, Z79.899]  Not Applicable    Bipolar disorder [F31.9]  Yes    Chronic respiratory failure with hypoxia [J96.11]  Yes    ESRD on peritoneal dialysis [N18.6, Z99.2]  Not Applicable    End stage renal disease [N18.6]  Yes    Essential hypertension [I10]  Yes    Anemia due to chronic kidney disease [N18.9, D63.1]  Yes    Psoriasis [L40.9]  Yes    Monoclonal gammopathy of unknown significance (MGUS) [D47.2]  Yes    Hypothyroidism [E03.9]  Yes      Resolved Hospital Problems    Diagnosis Date Resolved POA    **Spontaneous bacterial peritonitis [K65.2] 04/09/2024 Yes    Peritoneal dialysis catheter in place [Z99.2] 04/09/2024 Not Applicable       78yo gentleman with sepsis/fever/hallucinations due to candidal SBP due to PD catheter.    Sepsis  PD catheter-related Candidal SBP  Continue Micafungin per ID  Outside cultures growing Candida  PD cath fluid cultures here growing yeast (prelim)  Blood cultures NGTD  PD catheter out 4/9  Afebrile >24h now, HR and BP fine     Immunosuppressed (Skyrizi for psoriasis)  H/o CDiff colitis  Minimize abx exposure  Consider stopping Skyrizi altogether     ESRD  PD cath out 4/9 and right IJ HD cath placed  HD planned for today  K+ looks fine    Hypomagnesemia  Mg++ onlyl 1.4 this AM, will replace     Anemia of CKD  Hgb down slightly  Monitor     DM2  Continue nightly Lantus at attenuated dose  Covering with SSI  A1c 6.6  Sugars acceptable here     HypoT4  Continue L-T4  TSH quite high but free T4 1.16, no change to current dose, repeat TFTs when not acutely ill     Recent hospitalizations for aspiration PNA  Chronic hypoxic resp failure  Continue supplemental O2 as needed  Incentive spirometer  Continue Brovana and PRN DuoNebs     HTN  BPs acceptable at present  Continue Coreg  with holding parameters       SCDs for DVT prophylaxis.  Full code.  Discussed with patient and consulting provider (Dr. Bunch).  Anticipate discharge  TBD        Shiraz Bolton MD  [facility]  04/10/24  09:33 EDT

## 2024-04-10 NOTE — PROGRESS NOTES
Discharge Planning Assessment  [facility]     Patient Name: Nelson Wang  MRN: [id number]  Today's Date: 4/10/2024    Admit Date: 4/8/2024    Plan: tbd   Discharge Needs Assessment       Row Name 04/10/24 1530       Living Environment    People in Home child(laura), adult    Current Living Arrangements home    Primary Care Provided by self;child(laura)    Provides Primary Care For no one    Family Caregiver if Needed child(laura), adult    Family Caregiver Names daughter, Jose    Quality of Family Relationships helpful;involved;supportive       Resource/Environmental Concerns    Resource/Environmental Concerns none       Transition Planning    Patient/Family Anticipates Transition to home with family;inpatient rehabilitation facility    Patient/Family Anticipated Services at Transition rehabilitation services;skilled nursing;home health care    Transportation Anticipated family or friend will provide;health plan transportation       Discharge Needs Assessment    Concerns to be Addressed adjustment to diagnosis/illness    Equipment Needed After Discharge other (see comments)  tbd    Discharge Facility/Level of Care Needs nursing facility, skilled    Provided Post Acute Provider List? Yes    Post Acute Provider List Home Health;Inpatient Rehab    Provided Post Acute Provider Quality & Resource List? Yes    Post Acute Provider Quality and Resource List Inpatient Rehab;Home Health    Delivered To Support Person    Support Person Jose dumont    Method of Delivery Telephone    Patient's Choice of Community Agency(s) [facility], [facility], [facility]                   Discharge Plan       Row Name 04/10/24 7986       Plan    Plan tbd    Plan Comments Spoke with patient's daughter, Jose [phone], verified facesheet. Patient lives with spouse, daughter Jose is involved and assist patient and spouse. Patient is current with [facility], daughter would like to continue using [facility]. Patient normally does his own PD at home.  Daughter stated PD is through [facility] and they're assisting patient and family to transition to HD in [place]. Referral sent to Cathy with [facility] [phone]. If rehab is needed pt's daughter requested referrals to [facility] and [facility]. Physical Therapy eval pending. CCP will follow up.      Row Name 04/10/24 1530       Plan    Plan tbd                  Continued Care and Services - Admitted Since 4/8/2024       Home Medical Care       Service Provider Request Status Selected Services Address Phone Fax Patient Preferred    [facility] [address] [address] [phone] --                     Demographic Summary    No documentation.                  Functional Status       Row Name 04/10/24 1536       Functional Status    Usual Activity Tolerance moderate       Functional Status, IADL    Medications independent    Meal Preparation independent    Housekeeping assistive person    Laundry assistive person    Shopping assistive person                   Psychosocial    No documentation.                  Abuse/Neglect    No documentation.                  Legal    No documentation.                  Substance Abuse    No documentation.                  Patient Forms    No documentation.                     Kelsey Hutchison, RN     Bi-Rhombic Flap Text: The defect edges were debeveled with a #15 scalpel blade.  Given the location of the defect and the proximity to free margins a bi-rhombic flap was deemed most appropriate.  Using a sterile surgical marker, an appropriate rhombic flap was drawn incorporating the defect. The area thus outlined was incised deep to adipose tissue with a #15 scalpel blade.  The skin margins were undermined to an appropriate distance in all directions utilizing iris scissors.

## 2024-04-10 NOTE — PROGRESS NOTES
[facility] Progress Note      Patient Name: Nelson Wang  : 1945  MRN: [id number]  Primary Care Physician:  Janna Mo MD  Date of admission: 2024    Subjective     Interval History:   The patient was seen and examined today for follow-up on ESRD   Patient underwent PD cath removal yesterday and insertion of nontunneled right IJ for dialysis.  Currently doing well.  Denies nausea or vomiting.  Afebrile.  Patient on fluid culture reported to be positive for Candida parapsilosis.     Review of Systems:   As noted above    Objective     Vitals:   Temp:  [97.6 °F (36.4 °C)-99.1 °F (37.3 °C)] 98 °F (36.7 °C)  Heart Rate:  [75-89] 79  Resp:  [16-20] 20  BP: (101-132)/(49-78) 116/62  Flow (L/min):  [2-4] 2    Intake/Output Summary (Last 24 hours) at 4/10/2024 0955  Last data filed at 4/10/2024 0900  Gross per 24 hour   Intake 540 ml   Output 400 ml   Net 140 ml       Physical Exam:    General Appearance: alert, oriented x 3, no acute distress   Skin: warm and dry  HEENT: oral mucosa normal, nonicteric sclera  Neck: supple, no JVD  Lungs: CTA  Heart: RRR, normal S1 and S2  Abdomen: Mildly tender  : no palpable bladder  Extremities: no edema, cyanosis or clubbing  Neuro: normal speech and mental status     Scheduled Meds:     arformoterol, 15 mcg, Nebulization, BID - RT  ARIPiprazole, 2 mg, Oral, BID  atorvastatin, 40 mg, Oral, Daily  carvedilol, 6.25 mg, Oral, Nightly  insulin glargine, 10 Units, Subcutaneous, Nightly  insulin lispro, 2-7 Units, Subcutaneous, 4x Daily AC & at Bedtime  levothyroxine, 150 mcg, Oral, Q AM  micafungin (MYCAMINE) IV, 100 mg, Intravenous, Q24H  pantoprazole, 40 mg, Oral, Q AM  saccharomyces boulardii, 250 mg, Oral, BID      IV Meds:        Results Reviewed:   I have personally reviewed the results from the time of this admission to 4/10/2024 09:55 EDT     Results from last 7 days   Lab Units 04/10/24  0553 24  0653 24  2144    SODIUM mmol/L 137 135* 134*   POTASSIUM mmol/L 4.1 3.9 4.2   CHLORIDE mmol/L 100 100 95*   CO2 mmol/L 21.9* 22.0 24.3   BUN mg/dL 62* 42* 46*   CREATININE mg/dL 7.81* 6.66* 6.31*   CALCIUM mg/dL 8.1* 7.4* 8.3*   BILIRUBIN mg/dL 0.3  --  0.4   ALK PHOS U/L 53  --  55   ALT (SGPT) U/L 9  --  10   AST (SGOT) U/L 22  --  19   GLUCOSE mg/dL 85 81 122*       Estimated Creatinine Clearance: 8.6 mL/min (A) (by C-G formula based on SCr of 7.81 mg/dL (H)).    Results from last 7 days   Lab Units 04/10/24  0553 04/09/24  0653   MAGNESIUM mg/dL 1.4*  --    PHOSPHORUS mg/dL 7.2* 4.1             Results from last 7 days   Lab Units 04/10/24  0553 04/09/24  0653 04/08/24  2140   WBC 10*3/mm3 6.47 8.08 9.96   HEMOGLOBIN g/dL 7.6* 8.1* 8.6*   PLATELETS 10*3/mm3 136* 144 128*             Assessment / Plan     ASSESSMENT:  End-stage renal disease on peritoneal dialysis now complicated with peritoneal dialysis related candidal peritonitis not assessed Diflucan and switched to micafungin per ID.  Now status post PD catheter removal on 4/9/2024  Peritoneal dialysis related peritonitis   Hypertension with CKD: Blood pressure acceptable  Type 2 diabetes mellitus with CKD   Hyperphosphatemia on binders  History of hypomagnesemia last magnesium 4.4  Sepsis secondary to peritoneal dialysis peritonitis        PLAN:  Patient is hemodynamically stable.  Will arrange for hemodialysis today through a right nontunneled IJ.  Awaiting resolution of active infection for placement of tunneled dialysis catheter  Replace magnesium  Restart  binders  Labs in a.m.  Appreciate ID, vascular surgery, and general surgery involvement      Thank you for involving us in the care of Nelson MEAGAN Moncadael.  Please feel free to call with any questions.    Dayne Rivas MD  04/10/24  09:55 EDT    [facility]  [phone]    Parts of this note may be an electronic transcription/translation of spoken language to printed text using the Dragon  dictation system.

## 2024-04-10 NOTE — NURSING NOTE
Spoke with [facility] regarding dialysis treatment tonight. They would like to wait for tomorrow. No new orders @ this time.

## 2024-04-10 NOTE — DISCHARGE PLACEMENT REQUEST
"Nelson Caceres (79 y.o. Male)       Date of Birth   1945    Social Security Number       Address   [address] [place] [address] [place] [address]    Home Phone   [phone]    MRN   [id number]       Ep[employer]opalian   Restorationism    Marital Status                               Admission Date   4/8/24    Admission Type   Emergency    Admitting Provider   Shiraz Bolton MD    Attending Provider   Shiraz Bolton MD    Department, Room/Bed   [facility], N331/1       Discharge Date       Discharge Disposition       Discharge Destination                                 Attending Provider: Shiraz Bolton MD    Allergies: No Known Allergies    Isolation: None   Infection: None   Code Status: CPR    Ht: 170.2 cm (67\")   Wt: 78 kg (171 lb 15.3 oz)    Admission Cmt: None   Principal Problem: Spontaneous bacterial peritonitis [K65.2]                   Active Insurance as of 4/8/2024       Primary Coverage       Payor Plan Insurance Group Employer/Plan Group    MEDICARE MEDICARE A & B        Payor Plan Address Payor Plan Phone Number Payor Plan Fax Number Effective Dates    [address] [phone]  1/1/2011 - None Entered    [facility] [id number]         Subscriber Name Subscriber Birth Date Member ID       NELSON CACERES 1945 [id number]               Secondary Coverage       Payor Plan Insurance Group Employer/Plan Group    AARP  SUP AARP HEALTH CARE OPTIONS        Payor Plan Address Payor Plan Phone Number Payor Plan Fax Number Effective Dates    [facility] [phone]  1/1/2021 - None Entered    [address]       [facility] [address]         Subscriber Name Subscriber Birth Date Member ID       NELSON CACERES 1945 [id number]                     Emergency Contacts        (Rel.) Home Phone Work Phone Mobile Phone    Jose Caceres (Daughter) [phone] -- [phone]    CACERESKALEIGH LIANG (Spouse) -- -- [phone]              "

## 2024-04-10 NOTE — PROGRESS NOTES
ID note for peritonitis  S: he is feeling better. Agitated overnight. Pain improved. AF.    Physical Exam:   Vital Signs   Temp:  [97.6 °F (36.4 °C)-99.1 °F (37.3 °C)] 98 °F (36.7 °C)  Heart Rate:  [75-89] 77  Resp:  [16-20] 18  BP: (101-132)/(49-78) 131/78    GENERAL: Awake and alert, sickly  HEENT: Oropharynx is clear. Hearing is grossly normal.   EYES: . No conjunctival injection. No lid lag.   LUNGS:normal respiratory effort.   SKIN: no cutaneous eruptions in exposed areas  Abd soft and not ttp    Results Review:  White count 6.47  Creatinine 7.8  Glc 84-91    Microbiology:  Blood cultures ngtd  Respiratory pathogen panel neg  4/5 peritoneal fluid with 1745 white blood cells: candida parapsilosis  4/9 Peritoneal cx: pending (Gs with budding yeast)    A/p  1.  Sepsis/PD catheter associated peritonitis  2.  End-stage renal disease  3.  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  Recent E. coli bacteremia from pneumonia treated at [facility]   5. Immunosuprpession    His outpatient culture returned yesterday and grew Candida parapsilosis. Vancomycin, cefepime and fluconazole were discontinued in favor of micafungin..  Cont micafungin 100mg IV q24, no renal dose adjustment needed  S/p removal of PD catheter (Jamal) and placement of shiley dialysis catheter (Domingo) on 4/9  Op PD cath cx pending, but gram stain with budding yeast  Clinically seems better  D/w Dr Nir Christy and should be okay for TDC provided temps stay normal and bcx neg

## 2024-04-10 NOTE — PLAN OF CARE
Goal Outcome Evaluation:           Progress: improving  Outcome Evaluation: Pt came up from PACU very confused and restless, was in BUE retraints, order obtained in pacu. pt became very violent and aggresive with staff and was trying to get out of bet, violent restraints x4 and a sitter were put in place. pt was able to be downgraded to nonviolent BUE restraints. pt still confused a times, 250 cc bolus given for hypotension. pt voiding well with help with urinal. pt has been able to sleep on and off during the shift. will try to d/c restriant when pt is back to baseline. IV abx given. labs in am. HD orders called in for am.

## 2024-04-11 LAB
ALBUMIN SERPL-MCNC: 2.7 G/DL (ref 3.5–5.2)
ALBUMIN/GLOB SERPL: 0.9 G/DL
ALP SERPL-CCNC: 55 U/L (ref 39–117)
ALT SERPL W P-5'-P-CCNC: 12 U/L (ref 1–41)
ANION GAP SERPL CALCULATED.3IONS-SCNC: 9 MMOL/L (ref 5–15)
AST SERPL-CCNC: 20 U/L (ref 1–40)
BASOPHILS # BLD AUTO: 0.01 10*3/MM3 (ref 0–0.2)
BASOPHILS NFR BLD AUTO: 0.2 % (ref 0–1.5)
BILIRUB SERPL-MCNC: 0.2 MG/DL (ref 0–1.2)
BUN SERPL-MCNC: 30 MG/DL (ref 8–23)
BUN/CREAT SERPL: 5.2 (ref 7–25)
CALCIUM SPEC-SCNC: 8.3 MG/DL (ref 8.6–10.5)
CHLORIDE SERPL-SCNC: 108 MMOL/L (ref 98–107)
CO2 SERPL-SCNC: 27 MMOL/L (ref 22–29)
CREAT SERPL-MCNC: 5.82 MG/DL (ref 0.76–1.27)
DEPRECATED RDW RBC AUTO: 50.4 FL (ref 37–54)
EGFRCR SERPLBLD CKD-EPI 2021: 9.3 ML/MIN/1.73
EOSINOPHIL # BLD AUTO: 0.43 10*3/MM3 (ref 0–0.4)
EOSINOPHIL NFR BLD AUTO: 7.8 % (ref 0.3–6.2)
ERYTHROCYTE [DISTWIDTH] IN BLOOD BY AUTOMATED COUNT: 14.3 % (ref 12.3–15.4)
GLOBULIN UR ELPH-MCNC: 3 GM/DL
GLUCOSE BLDC GLUCOMTR-MCNC: 108 MG/DL (ref 70–130)
GLUCOSE BLDC GLUCOMTR-MCNC: 134 MG/DL (ref 70–130)
GLUCOSE BLDC GLUCOMTR-MCNC: 145 MG/DL (ref 70–130)
GLUCOSE BLDC GLUCOMTR-MCNC: 211 MG/DL (ref 70–130)
GLUCOSE SERPL-MCNC: 119 MG/DL (ref 65–99)
HCT VFR BLD AUTO: 24.6 % (ref 37.5–51)
HGB BLD-MCNC: 7.6 G/DL (ref 13–17.7)
IMM GRANULOCYTES # BLD AUTO: 0.04 10*3/MM3 (ref 0–0.05)
IMM GRANULOCYTES NFR BLD AUTO: 0.7 % (ref 0–0.5)
LYMPHOCYTES # BLD AUTO: 1.21 10*3/MM3 (ref 0.7–3.1)
LYMPHOCYTES NFR BLD AUTO: 21.9 % (ref 19.6–45.3)
MAGNESIUM SERPL-MCNC: 1.6 MG/DL (ref 1.6–2.4)
MCH RBC QN AUTO: 29.7 PG (ref 26.6–33)
MCHC RBC AUTO-ENTMCNC: 30.9 G/DL (ref 31.5–35.7)
MCV RBC AUTO: 96.1 FL (ref 79–97)
MONOCYTES # BLD AUTO: 0.67 10*3/MM3 (ref 0.1–0.9)
MONOCYTES NFR BLD AUTO: 12.1 % (ref 5–12)
NEUTROPHILS NFR BLD AUTO: 3.17 10*3/MM3 (ref 1.7–7)
NEUTROPHILS NFR BLD AUTO: 57.3 % (ref 42.7–76)
NRBC BLD AUTO-RTO: 0 /100 WBC (ref 0–0.2)
PHOSPHATE SERPL-MCNC: 3.9 MG/DL (ref 2.5–4.5)
PLATELET # BLD AUTO: 148 10*3/MM3 (ref 140–450)
PMV BLD AUTO: 10.1 FL (ref 6–12)
POTASSIUM SERPL-SCNC: 4.5 MMOL/L (ref 3.5–5.2)
PROT SERPL-MCNC: 5.7 G/DL (ref 6–8.5)
RBC # BLD AUTO: 2.56 10*6/MM3 (ref 4.14–5.8)
SODIUM SERPL-SCNC: 144 MMOL/L (ref 136–145)
WBC NRBC COR # BLD AUTO: 5.53 10*3/MM3 (ref 3.4–10.8)

## 2024-04-11 PROCEDURE — 85025 COMPLETE CBC W/AUTO DIFF WBC: CPT | Performed by: HOSPITALIST

## 2024-04-11 PROCEDURE — 63710000001 INSULIN LISPRO (HUMAN) PER 5 UNITS: Performed by: SURGERY

## 2024-04-11 PROCEDURE — 94799 UNLISTED PULMONARY SVC/PX: CPT

## 2024-04-11 PROCEDURE — 94761 N-INVAS EAR/PLS OXIMETRY MLT: CPT

## 2024-04-11 PROCEDURE — 83735 ASSAY OF MAGNESIUM: CPT | Performed by: HOSPITALIST

## 2024-04-11 PROCEDURE — 25010000002 MICAFUNGIN SODIUM 100 MG RECONSTITUTED SOLUTION 1 EACH VIAL: Performed by: SURGERY

## 2024-04-11 PROCEDURE — 25010000002 MAGNESIUM SULFATE 2 GM/50ML SOLUTION: Performed by: HOSPITALIST

## 2024-04-11 PROCEDURE — 84100 ASSAY OF PHOSPHORUS: CPT | Performed by: HOSPITALIST

## 2024-04-11 PROCEDURE — 80053 COMPREHEN METABOLIC PANEL: CPT | Performed by: HOSPITALIST

## 2024-04-11 PROCEDURE — 99233 SBSQ HOSP IP/OBS HIGH 50: CPT | Performed by: INTERNAL MEDICINE

## 2024-04-11 PROCEDURE — 63710000001 INSULIN GLARGINE PER 5 UNITS: Performed by: SURGERY

## 2024-04-11 PROCEDURE — 82948 REAGENT STRIP/BLOOD GLUCOSE: CPT

## 2024-04-11 PROCEDURE — 97530 THERAPEUTIC ACTIVITIES: CPT

## 2024-04-11 PROCEDURE — 99232 SBSQ HOSP IP/OBS MODERATE 35: CPT | Performed by: SURGERY

## 2024-04-11 PROCEDURE — 97162 PT EVAL MOD COMPLEX 30 MIN: CPT

## 2024-04-11 PROCEDURE — 94664 DEMO&/EVAL PT USE INHALER: CPT

## 2024-04-11 RX ORDER — MAGNESIUM SULFATE HEPTAHYDRATE 40 MG/ML
2 INJECTION, SOLUTION INTRAVENOUS ONCE
Status: COMPLETED | OUTPATIENT
Start: 2024-04-11 | End: 2024-04-11

## 2024-04-11 RX ADMIN — INSULIN LISPRO 3 UNITS: 100 INJECTION, SOLUTION INTRAVENOUS; SUBCUTANEOUS at 16:43

## 2024-04-11 RX ADMIN — ARFORMOTEROL TARTRATE 15 MCG: 15 SOLUTION RESPIRATORY (INHALATION) at 20:32

## 2024-04-11 RX ADMIN — INSULIN GLARGINE 10 UNITS: 100 INJECTION, SOLUTION SUBCUTANEOUS at 22:00

## 2024-04-11 RX ADMIN — ARFORMOTEROL TARTRATE 15 MCG: 15 SOLUTION RESPIRATORY (INHALATION) at 07:35

## 2024-04-11 RX ADMIN — LEVOTHYROXINE SODIUM 150 MCG: 150 TABLET ORAL at 05:19

## 2024-04-11 RX ADMIN — ARIPIPRAZOLE 2 MG: 2 TABLET ORAL at 21:15

## 2024-04-11 RX ADMIN — Medication 3 MG: at 21:14

## 2024-04-11 RX ADMIN — MAGNESIUM SULFATE HEPTAHYDRATE 2 G: 40 INJECTION, SOLUTION INTRAVENOUS at 10:37

## 2024-04-11 RX ADMIN — SEVELAMER CARBONATE 800 MG: 800 TABLET, FILM COATED ORAL at 16:44

## 2024-04-11 RX ADMIN — ARIPIPRAZOLE 2 MG: 2 TABLET ORAL at 08:43

## 2024-04-11 RX ADMIN — MICAFUNGIN 100 MG: 20 INJECTION, POWDER, LYOPHILIZED, FOR SOLUTION INTRAVENOUS at 22:19

## 2024-04-11 RX ADMIN — SEVELAMER CARBONATE 800 MG: 800 TABLET, FILM COATED ORAL at 11:46

## 2024-04-11 RX ADMIN — Medication 250 MG: at 21:15

## 2024-04-11 RX ADMIN — SEVELAMER CARBONATE 800 MG: 800 TABLET, FILM COATED ORAL at 08:43

## 2024-04-11 RX ADMIN — Medication 250 MG: at 08:43

## 2024-04-11 RX ADMIN — CARVEDILOL 6.25 MG: 6.25 TABLET, FILM COATED ORAL at 21:15

## 2024-04-11 RX ADMIN — ATORVASTATIN CALCIUM 40 MG: 20 TABLET, FILM COATED ORAL at 08:43

## 2024-04-11 RX ADMIN — PANTOPRAZOLE SODIUM 40 MG: 40 TABLET, DELAYED RELEASE ORAL at 05:19

## 2024-04-11 NOTE — PLAN OF CARE
Goal Outcome Evaluation:  Plan of Care Reviewed With: patient         Pt admit from home due to fever, spontaneous bacterial peritonitis at pertioneal catheter  , sepsis. S/p right internal jugular HD catheter placed 4/9/24. Other: anemia ( hgb 7.6  today) ESRD, DM, Bipolar, immunosuppression, vertigo, multiple falls with fx, right SAUL, chronic gout, chronic respiratory failure. Pt lives with adult children and reported indep amb without AD. Pt moved to eob with supervision very quickly today. Discussion/education on energy conservation , safety with ambulation and PLB during activities due to possible desat or impaired endurance with anemia. Pt encouraged to PLB . STS with sba/cga. He amb 75' with cga unsteadiness noted. Return to eob for seated rest. Pt amb another 150'with cga until lob and min asst to regain balance. O2 level 90% upon return to bedside. Discussion on possible need for stc and pt agreed to trial. Pt also performed sidestepping lef/right and backward at rail for 10' wit cga. Pt demonstrated general weakness, impaired endurance, impaired balance with transfers and amb. Decreased safety awareness  and  need for assistance noted. Lob x3 today. Fall risk without asst/AD. He will likely benefit from cont skilled PT to address deficits and for progression toward indep with least vs no AD.          Anticipated Discharge Disposition (PT): home with assist, home with home health

## 2024-04-11 NOTE — PROGRESS NOTES
Name: Nelson Wang ADMIT: 2024   : 1945  PCP: Janna Mo MD    MRN: [id number] LOS: 2 days   AGE/SEX: 79 y.o. male  ROOM:  [place] [address]     CC: Bacteremia  Interval History: Patient feels much better but remains in ICU  Subjective   Subjective     Review of Systems  Objective   Objective     Vitals:   Temp:  [97.3 °F (36.3 °C)-98.4 °F (36.9 °C)] 97.5 °F (36.4 °C)  Heart Rate:  [] 76  Resp:  [16-20] 16  BP: (119-163)/(66-87) 143/68    I/O this shift:  In: 480 [P.O.:480]  Out: -     Scheduled Meds:     arformoterol, 15 mcg, Nebulization, BID - RT  ARIPiprazole, 2 mg, Oral, BID  atorvastatin, 40 mg, Oral, Daily  carvedilol, 6.25 mg, Oral, Nightly  insulin glargine, 10 Units, Subcutaneous, Nightly  insulin lispro, 2-7 Units, Subcutaneous, 4x Daily AC & at Bedtime  levothyroxine, 150 mcg, Oral, Q AM  micafungin (MYCAMINE) IV, 100 mg, Intravenous, Q24H  pantoprazole, 40 mg, Oral, Q AM  saccharomyces boulardii, 250 mg, Oral, BID  sevelamer, 800 mg, Oral, TID With Meals      IV Meds:        Physical Exam  Vascular: Right IJ site dressed.  No drainage.    Data Reviewed:  CBC    Results from last 7 days   Lab Units 24  0640 04/10/24  0553 24  0653 24  2140   WBC 10*3/mm3 5.53 6.47 8.08 9.96   HEMOGLOBIN g/dL 7.6* 7.6* 8.1* 8.6*   PLATELETS 10*3/mm3 148 136* 144 128*     BMP   Results from last 7 days   Lab Units 24  0640 04/10/24  0553 24  0653 24  2140   SODIUM mmol/L 144 137 135* 134*   POTASSIUM mmol/L 4.5 4.1 3.9 4.2   CHLORIDE mmol/L 108* 100 100 95*   CO2 mmol/L 27.0 21.9* 22.0 24.3   BUN mg/dL 30* 62* 42* 46*   CREATININE mg/dL 5.82* 7.81* 6.66* 6.31*   GLUCOSE mg/dL 119* 85 81 122*   MAGNESIUM mg/dL 1.6 1.4*  --   --    PHOSPHORUS mg/dL 3.9 7.2* 4.1  --      Infection   Results from last 7 days   Lab Units 24  1357 24  2206 24  2140   BLOODCX   --  No growth at 2 days  No growth at 2 days  --    BODYFLDCX  Yeast isolated*  --    --    PROCALCITONIN ng/mL  --   --  1.25*     Radiology(recent) XR Chest 1 View    Result Date: 4/9/2024  1. Placement of a right IJ centimeters cath with tip extending to the right atrium. 2. Diminished lung volumes with pulmonary vascular engorgement. Basilar atelectasis.  No pneumothorax.  This report was finalized on 4/9/2024 7:47 PM by Dr. Margarito Kincaid M.D on Workstation: [id number]       Most notable findings include: Hgb 7.6    Active Hospital Problems:   Active Hospital Problems    Diagnosis  POA    Sepsis [A41.9]  Yes    Type 2 diabetes mellitus, with long-term current use of insulin [E11.9, Z79.4]  Not Applicable    GERD (gastroesophageal reflux disease) [K21.9]  Yes    Immunosuppression due to drug therapy [D84.821, Z79.899]  Not Applicable    Bipolar disorder [F31.9]  Yes    Chronic respiratory failure with hypoxia [J96.11]  Yes    ESRD on peritoneal dialysis [N18.6, Z99.2]  Not Applicable    End stage renal disease [N18.6]  Yes    Essential hypertension [I10]  Yes    Anemia due to chronic kidney disease [N18.9, D63.1]  Yes    Psoriasis [L40.9]  Yes    Monoclonal gammopathy of unknown significance (MGUS) [D47.2]  Yes    Hypothyroidism [E03.9]  Yes      Resolved Hospital Problems    Diagnosis Date Resolved POA    **Spontaneous bacterial peritonitis [K65.2] 04/09/2024 Yes    Peritoneal dialysis catheter in place [Z99.2] 04/09/2024 Not Applicable      Assessment & Plan     Assessment / Plan     Peritonitis/bacteremia: Patient is status post temporary right IJ dialysis catheter.  He has been seen by infectious disease and felt to be reasonable risk to convert this over to a tunneled dialysis catheter.  The patient is immunosuppressed on medication secondary to his psoriasis but that is being reconsidered.  I have placed him on the OR schedule for tomorrow afternoon for my partner to exchange this out for a tunneled catheter.  Discussed with patient and family.  Questions asked and answered.  He is still  hopeful to return to peritoneal dialysis.  If not, he would benefit from evaluation for long-term upper extremity access either here or back home in [place].    Yunier Lane MD  04/11/24  14:51 EDT  Office Number [phone]

## 2024-04-11 NOTE — PROGRESS NOTES
ID note for peritonitis  S: No sig pain. Tolerating abx. AF    Physical Exam:   Vital Signs   Temp:  [97.3 °F (36.3 °C)-98.4 °F (36.9 °C)] 97.8 °F (36.6 °C)  Heart Rate:  [] 73  Resp:  [16-20] 16  BP: (118-163)/(62-87) 137/83    GENERAL: Awake and alert, sickly  HEENT: Oropharynx is clear. Hearing is grossly normal.   EYES: . No conjunctival injection. No lid lag.   LUNGS:normal respiratory effort.   SKIN: no cutaneous eruptions in exposed areas  Abd soft and not ttp    Results Review:  White count 5.5  Creatinine 5.8  Glc 119-227    Microbiology:  Blood cultures ngtd  Respiratory pathogen panel neg  4/5 peritoneal fluid with 1745 white blood cells: candida parapsilosis  4/9 Peritoneal cx: yeast    A/p  1.  Sepsis/PD catheter associated peritonitis  2.  End-stage renal disease  3.  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  Recent E. coli bacteremia from pneumonia treated at [facility]   5.  Immunosuppression from treatment of psoriasis    His outpatient culture grew Candida parapsilosis and cx here also with yeast  Cont micafungin 100mg IV q24, no renal dose adjustment needed  S/p removal of PD catheter (Jamal) and placement of shiley dialysis catheter (Domingo) on 4/9  D/w Dr Nir Christy and Dr Lane and should be okay for TDC whenever necessary  Will need to give strong consideration to stopping psoriatic medication given multiple infectious complications in last 2-3 mo

## 2024-04-11 NOTE — PLAN OF CARE
Goal Outcome Evaluation:  Plan of Care Reviewed With: patient        Progress: improving  Outcome Evaluation: VSS, no complaints of pain. pt has moments of forgetfulness and pulling at equipment. sitter @ bedside, pt rested well during shift. labs in am. iv abx given.

## 2024-04-11 NOTE — PROGRESS NOTES
[facility] Progress Note      Patient Name: Nelson Wang  : 1945  MRN: [id number]  Primary Care Physician:  Janna Mo MD  Date of admission: 2024    Subjective     Interval History:   The patient was seen and examined today for follow-up on ESRD     Pt had his first hemodialysis  yesterday through nontunneled right IJ, removed 1L, tolerated well w/o issues.  Denies chest pain, SOA, nausea or vomiting.  Afebrile.    Review of Systems:   As noted above    Objective     Vitals:   Temp:  [97.3 °F (36.3 °C)-98.4 °F (36.9 °C)] 97.5 °F (36.4 °C)  Heart Rate:  [] 76  Resp:  [16-20] 16  BP: (118-163)/(63-87) 143/68    Intake/Output Summary (Last 24 hours) at 2024 1359  Last data filed at 2024 1201  Gross per 24 hour   Intake 480 ml   Output 1200 ml   Net -720 ml       Physical Exam:    General Appearance: Slightly confused ill looking.  Skin: warm and dry  HEENT: oral mucosa normal, nonicteric sclera  Neck: supple, no JVD  Lungs: CTA  Heart: RRR, normal S1 and S2  Abdomen: Mildly tender  : no palpable bladder  Extremities: no edema, cyanosis or clubbing  Neuro: normal speech and mental status   Right IJ dialysis catheter with no erythema and no discharge there is no signs of infection  Scheduled Meds:     arformoterol, 15 mcg, Nebulization, BID - RT  ARIPiprazole, 2 mg, Oral, BID  atorvastatin, 40 mg, Oral, Daily  carvedilol, 6.25 mg, Oral, Nightly  insulin glargine, 10 Units, Subcutaneous, Nightly  insulin lispro, 2-7 Units, Subcutaneous, 4x Daily AC & at Bedtime  levothyroxine, 150 mcg, Oral, Q AM  micafungin (MYCAMINE) IV, 100 mg, Intravenous, Q24H  pantoprazole, 40 mg, Oral, Q AM  saccharomyces boulardii, 250 mg, Oral, BID  sevelamer, 800 mg, Oral, TID With Meals      IV Meds:        Results Reviewed:   I have personally reviewed the results from the time of this admission to 2024 13:59 EDT     Results from last 7 days   Lab Units 24  0640  04/10/24  0553 04/09/24  0653 04/08/24  2140   SODIUM mmol/L 144 137 135* 134*   POTASSIUM mmol/L 4.5 4.1 3.9 4.2   CHLORIDE mmol/L 108* 100 100 95*   CO2 mmol/L 27.0 21.9* 22.0 24.3   BUN mg/dL 30* 62* 42* 46*   CREATININE mg/dL 5.82* 7.81* 6.66* 6.31*   CALCIUM mg/dL 8.3* 8.1* 7.4* 8.3*   BILIRUBIN mg/dL 0.2 0.3  --  0.4   ALK PHOS U/L 55 53  --  55   ALT (SGPT) U/L 12 9  --  10   AST (SGOT) U/L 20 22  --  19   GLUCOSE mg/dL 119* 85 81 122*       Estimated Creatinine Clearance: 11.5 mL/min (A) (by C-G formula based on SCr of 5.82 mg/dL (H)).    Results from last 7 days   Lab Units 04/11/24  0640 04/10/24  0553 04/09/24  0653   MAGNESIUM mg/dL 1.6 1.4*  --    PHOSPHORUS mg/dL 3.9 7.2* 4.1             Results from last 7 days   Lab Units 04/11/24  0640 04/10/24  0553 04/09/24  0653 04/08/24  2140   WBC 10*3/mm3 5.53 6.47 8.08 9.96   HEMOGLOBIN g/dL 7.6* 7.6* 8.1* 8.6*   PLATELETS 10*3/mm3 148 136* 144 128*             Assessment / Plan     ASSESSMENT:  End-stage renal disease on peritoneal dialysis now complicated with peritoneal dialysis related candidal peritonitis not assessed Diflucan and switched to micafungin per ID.  Now status post PD catheter removal on 4/9/2024.   Peritoneal dialysis related peritonitis   Hypertension with CKD: Blood pressure acceptable  Type 2 diabetes mellitus with CKD   Hyperphosphatemia on binders  History of hypomagnesemia   Sepsis secondary to peritoneal dialysis peritonitis        PLAN:  Patient is hemodynamically stable.  Continue MWF dialysis schedule, plan for dialysis tomorrow.  Okay for tunneled dialysis catheter placement per ID recommendation.  Appreciate vascular surgery involvement  Labs in a.m.    Thank you for involving us in the care of Nelson Wang.  Please feel free to call with any questions.    Dayne Christy MD   04/11/24  13:59 EDT    [facility]  [phone]    Parts of this note may be an electronic transcription/translation of  spoken language to printed text using the Dragon dictation system.

## 2024-04-11 NOTE — THERAPY EVALUATION
Patient Name: Nelson Wang  : 1945    MRN: [id number]                              Today's Date: 2024       Admit Date: 2024    Visit Dx:     ICD-10-CM ICD-9-CM   1. Sepsis, due to unspecified organism, unspecified whether acute organ dysfunction present  A41.9 038.9     995.91   2. SBP (spontaneous bacterial peritonitis)  K65.2 567.23   3. Fungal infection  B49 117.9   4. ESRD on peritoneal dialysis  N18.6 585.6    Z99.2 V45.11   5. Spontaneous bacterial peritonitis  K65.2 567.23   6. End stage renal disease  N18.6 585.6     Patient Active Problem List   Diagnosis    Essential hypertension    Bradycardia, sinus    Anemia due to chronic kidney disease    Hypothyroidism    Idiopathic gout    Proteinuria    Psoriasis    Thrombocytopenia    Type 2 diabetes mellitus without complication    CKD (chronic kidney disease), stage IV    Hyperlipidemia    Monoclonal gammopathy of unknown significance (MGUS)    Stage 5 chronic kidney disease    Chronic gout without tophus    Peritoneal dialysis catheter dysfunction    Medicare annual wellness visit, subsequent    Chronic cough    Peritonitis    HCAP (healthcare-associated pneumonia)    Acute on chronic respiratory failure with hypoxia    End stage renal disease    Aspiration pneumonitis    Sepsis    Type 2 diabetes mellitus, with long-term current use of insulin    GERD (gastroesophageal reflux disease)    Immunosuppression due to drug therapy    Bipolar disorder    Chronic respiratory failure with hypoxia    ESRD on peritoneal dialysis     Past Medical History:   Diagnosis Date    Anemia     Ankle pain, right     CKD (chronic kidney disease), stage IV     Diabetes     Gout     High cholesterol     HL (hearing loss)     Hyperkalemia     Hypertension     Hypothyroidism     Psoriasis     Vitiligo      Past Surgical History:   Procedure Laterality Date    ANKLE SURGERY  1990    APPENDECTOMY N/A     BRONCHOSCOPY N/A 3/1/2024    Procedure: BRONCHOSCOPY WITH  BAL AND WASHINGS;  Surgeon: Sandra Espinal MD;  Location: [facility] ENDOSCOPY;  Service: Pulmonary;  Laterality: N/A;  PNEUMONIA    COLONOSCOPY N/A 06/2022    [facility]    HIP BIPOLAR REPLACEMENT Right 01/2000    INSERTION HEMODIALYSIS CATHETER Left 4/9/2024    Procedure: HEMODIALYSIS CATHETER INSERTION;  Surgeon: Jose Berry MD;  Location: [facility] OR;  Service: General;  Laterality: Left;    INSERTION PERITONEAL DIALYSIS CATHETER N/A 3/27/2023    Procedure: LAPAROSCOPIC INSERTION PERITONEAL DIALYSIS CATHETER, LAPAROSCOPIC OMENTOPEXY WITH LYSIS OF ADHESIONS;  Surgeon: Jose Berry MD;  Location: [facility] OR;  Service: General;  Laterality: N/A;    INSERTION PERITONEAL DIALYSIS CATHETER Left 7/23/2023    Procedure: REVISION OF PERITONEAL DIALYSIS CATHETER;  Surgeon: Radha Oreilly MD;  Location: [facility] OR;  Service: General;  Laterality: Left;    REMOVAL PERITONEAL DIALYSIS CATHETER N/A 4/9/2024    Procedure: REMOVAL PERITONEAL DIALYSIS CATHETER;  Surgeon: Jose Berry MD;  Location: [facility] OR;  Service: General;  Laterality: N/A;    RENAL BIOPSY Left 07/15/2022      General Information       Row Name 04/11/24 1515          Physical Therapy Time and Intention    Document Type evaluation  -SV     Mode of Treatment individual therapy;physical therapy  -SV       Row Name 04/11/24 1515          General Information    Patient Profile Reviewed yes  -SV     Prior Level of Function independent:  -SV     Existing Precautions/Restrictions fall  hgb 9.1 am, 7.1 in afternoon  -SV     Barriers to Rehab medically complex;previous functional deficit;hearing deficit  [place]  -SV       Row Name 04/11/24 1515          Living Environment    People in Home child(laura), adult  -SV       Row Name 04/11/24 1515          Cognition    Orientation Status (Cognition) oriented x 3  -SV       Row Name 04/11/24 1515          Safety Issues, Functional Mobility    Safety Issues Affecting  Function (Mobility) at risk behavior observed;awareness of need for assistance;impulsivity;insight into deficits/self-awareness;judgment  -SV     Impairments Affecting Function (Mobility) balance;endurance/activity tolerance;shortness of breath  -SV               User Key  (r) = Recorded By, (t) = Taken By, (c) = Cosigned By      Initials Name Provider Type    Radha Box, PT Physical Therapist                   Mobility       Row Name 04/11/24 1733          Bed Mobility    Bed Mobility supine-sit  -SV     Supine-Sit [place] (Bed Mobility) standby assist  -SV     Assistive Device (Bed Mobility) bed rails  -SV       Row Name 04/11/24 1733          Sit-Stand Transfer    Sit-Stand [place] (Transfers) standby assist;contact guard  -SV     Comment, (Sit-Stand Transfer) no AD , declined rwx  -[facility] Name 04/11/24 1733          Gait/Stairs (Locomotion)    [place] Level (Gait) contact guard;minimum assist (75% patient effort)  -     Distance in Feet (Gait) 150  multiple lob with self recovery x2 , cga/minx1  -SV     Comment, (Gait/Stairs) initial amb with RA trial 75' with cga unsteadiness noted, seated rest then amb another 150'at pt request  -               User Key  (r) = Recorded By, (t) = Taken By, (c) = Cosigned By      Initials Name Provider Type    Radha Box, PT Physical Therapist                   Obj/Interventions       [facility] Name 04/11/24 1735          Range of Motion Comprehensive    General Range of Motion no range of motion deficits identified  -SV       Row Name 04/11/24 1735          Strength Comprehensive (MMT)    General Manual Muscle Testing (MMT) Assessment no strength deficits identified  -     Comment, General Manual Muscle Testing (MMT) Assessment BUE/BLE gross general strength appear > 3/5 , no MMT today  -       Row Name 04/11/24 1735          Motor Skills    Therapeutic Exercise --  educated on PLB due to soa, endurance issues , anemia , performed seated  laq with df 10, 15, 20 h BLE in sitting ( laq with DF)  -SV       Row Name 04/11/24 1735          Balance    Comment, Balance sidestepping left/right and backward amb at rail for 10' cga  -SV       Row Name 04/11/24 1735          Sensory Assessment (Somatosensory)    Sensory Assessment (Somatosensory) not tested  -               User Key  (r) = Recorded By, (t) = Taken By, (c) = Cosigned By      Initials Name Provider Type    SV Radha Kennedy, PT Physical Therapist                   Goals/Plan       Row Name 04/11/24 1739          Bed Mobility Goal 1 (PT)    Activity/Assistive Device (Bed Mobility Goal 1, PT) sit to supine/supine to sit  -SV     [place] Level/Cues Needed (Bed Mobility Goal 1, PT) independent  -SV     Time Frame (Bed Mobility Goal 1, PT) 10 days  -SV       Row Name 04/11/24 1739          Transfer Goal 1 (PT)    Activity/Assistive Device (Transfer Goal 1, PT) sit-to-stand/stand-to-sit  -SV     [place] Level/Cues Needed (Transfer Goal 1, PT) independent  -SV     Time Frame (Transfer Goal 1, PT) 10 days  -SV     Strategies/Barriers (Transfers Goal 1, PT) least vs no AD  -SV       Row Name 04/11/24 1739          Gait Training Goal 1 (PT)    Activity/Assistive Device (Gait Training Goal 1, PT) gait (walking locomotion)  -SV     [place] Level (Gait Training Goal 1, PT) independent  -SV     Distance (Gait Training Goal 1, PT) 200' least vs no AD  -SV       Row Name 04/11/24 1739          Therapy Assessment/Plan (PT)    Planned Therapy Interventions (PT) balance training;gait training;home exercise program;patient/family education;strengthening;stair training;transfer training  -SV               User Key  (r) = Recorded By, (t) = Taken By, (c) = Cosigned By      Initials Name Provider Type    Radha Box, PT Physical Therapist                   Clinical Impression       Row Name 04/11/24 1737          Pain    Pretreatment Pain Rating 0/10 - no pain  -SV     Posttreatment Pain  Rating 0/10 - no pain  -SV     Pre/Posttreatment Pain Comment no sign of pain  -SV     Pain Intervention(s) Repositioned;Ambulation/increased activity  -SV       Row Name 04/11/24 1737          Plan of Care Review    Plan of Care Reviewed With patient  -SV       Row Name 04/11/24 1737          Therapy Assessment/Plan (PT)    Patient/Family Therapy Goals Statement (PT) return to indep amb without AD community distances and no desat on RA  -SV     Rehab Potential (PT) good, to achieve stated therapy goals  -SV     Criteria for Skilled Interventions Met (PT) yes  -SV     Therapy Frequency (PT) 5 times/wk  -SV     Predicted Duration of Therapy Intervention (PT) 2=4 weeks  -SV       Row Name 04/11/24 1737          Vital Signs    Pre SpO2 (%) 93  -SV     O2 Delivery Pre Treatment room air  -SV     Intra SpO2 (%) 90  -SV     Post SpO2 (%) 92  -SV     O2 Delivery Post Treatment room air  -SV     Pre Patient Position Supine  -SV     Intra Patient Position Standing  -SV     Post Patient Position Sitting  -SV       Row Name 04/11/24 1737          Positioning and Restraints    Pre-Treatment Position in bed  -SV     Post Treatment Position bed  -SV     In Bed sitting EOB;with nsg;encouraged to call for assist  -SV               User Key  (r) = Recorded By, (t) = Taken By, (c) = Cosigned By      Initials Name Provider Type    SV Radha Kennedy, PT Physical Therapist                   Outcome Measures       Row Name 04/11/24 1740 04/11/24 0843       How much help from another person do you currently need...    Turning from your back to your side while in flat bed without using bedrails? 4  -SV 4  -CT    Moving from lying on back to sitting on the side of a flat bed without bedrails? 4  -SV 4  -CT    Moving to and from a bed to a chair (including a wheelchair)? 3  -SV 3  -CT    Standing up from a chair using your arms (e.g., wheelchair, bedside chair)? 3  -SV 3  -CT    Climbing 3-5 steps with a railing? 3  -SV 3  -CT    To walk  in hospital room? 3  -SV 3  -CT    AM-PAC 6 Clicks Score (PT) 20  -SV 20  -CT    Highest Level of Mobility Goal 6 --> Walk 10 steps or more  -SV 6 --> Walk 10 steps or more  -CT              User Key  (r) = Recorded By, (t) = Taken By, (c) = Cosigned By      Initials Name Provider Type    SV Radha Kennedy, PT Physical Therapist    CT Liliam Obrien, RN Registered Nurse                                 Physical Therapy Education       Title: PT OT SLP Therapies (Done)       Topic: Physical Therapy (Done)       Point: Mobility training (Done)       Learning Progress Summary             Patient Acceptance, E, VU,NR by  at 4/11/2024 1741                         Point: Home exercise program (Done)       Learning Progress Summary             Patient Acceptance, E, VU,NR by  at 4/11/2024 1741                                         User Key       Initials Effective Dates Name Provider Type Discipline     07/11/23 -  Radha Kennedy, PT Physical Therapist PT                  PT Recommendation and Plan  Planned Therapy Interventions (PT): balance training, gait training, home exercise program, patient/family education, strengthening, stair training, transfer training  Plan of Care Reviewed With: patient     Time Calculation:         PT Charges       Row Name 04/11/24 1753             Time Calculation    Start Time 1516  -      Stop Time 1539  -      Time Calculation (min) 23 min  -      PT Received On 04/11/24  -      PT - Next Appointment 04/12/24  -      PT Goal Re-Cert Due Date 04/21/24  -                User Key  (r) = Recorded By, (t) = Taken By, (c) = Cosigned By      Initials Name Provider Type    SV Radha Kennedy, PT Physical Therapist                  Therapy Charges for Today       Code Description Service Date Service Provider Modifiers Qty    82532422096 HC PT EVAL MOD COMPLEXITY 2 4/11/2024 Radha Kennedy, PT GP 1    23055966511 HC PT THERAPEUTIC ACT EA 15 MIN 4/11/2024 Brent  Radha TUTTLE, PT GP 1            PT G-Codes  AM-PAC 6 Clicks Score (PT): 20  PT Discharge Summary  Anticipated Discharge Disposition (PT): home with assist, home with home health    Radha Kennedy, PT  4/11/2024

## 2024-04-11 NOTE — PROGRESS NOTES
Name: Nelson Wang ADMIT: 2024   : 1945  PCP: Janna Mo MD    MRN: [id number] LOS: 2 days   AGE/SEX: 79 y.o. male  ROOM: [facility]     Subjective   Subjective   Feeling okay again today. No abd pain. No F/C/NS. No SOA. Voiding fine. Tolerating PO.        Objective   Objective   Vital Signs  Temp:  [97.3 °F (36.3 °C)-98.4 °F (36.9 °C)] 97.8 °F (36.6 °C)  Heart Rate:  [] 73  Resp:  [16-20] 16  BP: (118-163)/(62-87) 137/83  SpO2:  [88 %-96 %] 93 %  on   ;   Device (Oxygen Therapy): room air  Body mass index is 26.93 kg/m².    (No change in exam today)    Physical Exam  Vitals and nursing note reviewed. Exam conducted with a chaperone present (Sitter).   Constitutional:       General: He is not in acute distress.     Appearance: He is ill-appearing (chronically). He is not toxic-appearing or diaphoretic.   HENT:      Head: Normocephalic.      Nose: Nose normal.      Mouth/Throat:      Mouth: Mucous membranes are moist.      Pharynx: Oropharynx is clear.   Eyes:      General: No scleral icterus.        Right eye: No discharge.         Left eye: No discharge.      Extraocular Movements: Extraocular movements intact.      Conjunctiva/sclera: Conjunctivae normal.   Neck:      Comments: Right IJ central catheter in place  Cardiovascular:      Rate and Rhythm: Normal rate and regular rhythm.      Pulses: Normal pulses.   Pulmonary:      Effort: Pulmonary effort is normal. No respiratory distress.      Breath sounds: Normal breath sounds. No wheezing or rales.      Comments: Anteriorly   Abdominal:      General: Bowel sounds are normal. There is no distension.      Palpations: Abdomen is soft.      Tenderness: There is abdominal tenderness (mild, diffuse). There is no guarding or rebound.      Comments: PD catheter out now   Musculoskeletal:         General: No swelling or deformity.      Cervical back: Neck supple.      Comments: SCDs in place   Skin:     General: Skin is warm and dry.       Capillary Refill: Capillary refill takes less than 2 seconds.      Coloration: Skin is not jaundiced.      Comments: Vitiligo   Neurological:      Mental Status: He is alert and oriented to person, place, and time. Mental status is at baseline.      Cranial Nerves: No cranial nerve deficit.      Coordination: Coordination normal.   Psychiatric:         Mood and Affect: Mood normal.         Behavior: Behavior normal.       Results Review     I reviewed the patient's new clinical results.  Results from last 7 days   Lab Units 04/11/24  0640 04/10/24  0553 04/09/24  0653 04/08/24  2140   WBC 10*3/mm3 5.53 6.47 8.08 9.96   HEMOGLOBIN g/dL 7.6* 7.6* 8.1* 8.6*   PLATELETS 10*3/mm3 148 136* 144 128*     Results from last 7 days   Lab Units 04/11/24  0640 04/10/24  0553 04/09/24 0653 04/08/24  2140   SODIUM mmol/L 144 137 135* 134*   POTASSIUM mmol/L 4.5 4.1 3.9 4.2   CHLORIDE mmol/L 108* 100 100 95*   CO2 mmol/L 27.0 21.9* 22.0 24.3   BUN mg/dL 30* 62* 42* 46*   CREATININE mg/dL 5.82* 7.81* 6.66* 6.31*   GLUCOSE mg/dL 119* 85 81 122*   EGFR mL/min/1.73 9.3* 6.5* 7.9* 8.4*     Results from last 7 days   Lab Units 04/11/24  0640 04/10/24  0553 04/09/24  0653 04/08/24  2140   ALBUMIN g/dL 2.7* 2.3* 2.4* 2.8*   BILIRUBIN mg/dL 0.2 0.3  --  0.4   ALK PHOS U/L 55 53  --  55   AST (SGOT) U/L 20 22  --  19   ALT (SGPT) U/L 12 9  --  10     Results from last 7 days   Lab Units 04/11/24  0640 04/10/24  0553 04/09/24 0653 04/08/24  2140   CALCIUM mg/dL 8.3* 8.1* 7.4* 8.3*   ALBUMIN g/dL 2.7* 2.3* 2.4* 2.8*   MAGNESIUM mg/dL 1.6 1.4*  --   --    PHOSPHORUS mg/dL 3.9 7.2* 4.1  --      Results from last 7 days   Lab Units 04/08/24  2140   PROCALCITONIN ng/mL 1.25*   LACTATE mmol/L 1.6     Hemoglobin A1C   Date/Time Value Ref Range Status   04/10/2024 0553 6.60 (H) 4.80 - 5.60 % Final     Glucose   Date/Time Value Ref Range Status   04/11/2024 0720 134 (H) 70 - 130 mg/dL Final   04/10/2024 2100 227 (H) 70 - 130 mg/dL Final   04/10/2024  1149 134 (H) 70 - 130 mg/dL Final   04/09/2024 2113 84 70 - 130 mg/dL Final   04/09/2024 1747 91 70 - 130 mg/dL Final   04/09/2024 1139 88 70 - 130 mg/dL Final       XR Chest 1 View    Result Date: 4/9/2024  1. Placement of a right IJ centimeters cath with tip extending to the right atrium. 2. Diminished lung volumes with pulmonary vascular engorgement. Basilar atelectasis.  No pneumothorax.  This report was finalized on 4/9/2024 7:47 PM by Dr. Margarito Kincaid M.D on Workstation: [id number]       I have personally reviewed all medications:  Scheduled Medications  arformoterol, 15 mcg, Nebulization, BID - RT  ARIPiprazole, 2 mg, Oral, BID  atorvastatin, 40 mg, Oral, Daily  carvedilol, 6.25 mg, Oral, Nightly  insulin glargine, 10 Units, Subcutaneous, Nightly  insulin lispro, 2-7 Units, Subcutaneous, 4x Daily AC & at Bedtime  levothyroxine, 150 mcg, Oral, Q AM  micafungin (MYCAMINE) IV, 100 mg, Intravenous, Q24H  pantoprazole, 40 mg, Oral, Q AM  saccharomyces boulardii, 250 mg, Oral, BID  sevelamer, 800 mg, Oral, TID With Meals    Infusions   Diet  Diet: Regular/House, Diabetic; Consistent Carbohydrate; Fluid Consistency: Thin (IDDSI 0)    I have personally reviewed:  [x]  Laboratory   [x]  Microbiology   []  Radiology   [x]  EKG/Telemetry  []  Cardiology/Vascular   []  Pathology    []  Records       Assessment/Plan     Active Hospital Problems    Diagnosis  POA    Sepsis [A41.9]  Yes    Type 2 diabetes mellitus, with long-term current use of insulin [E11.9, Z79.4]  Not Applicable    GERD (gastroesophageal reflux disease) [K21.9]  Yes    Immunosuppression due to drug therapy [D84.821, Z79.899]  Not Applicable    Bipolar disorder [F31.9]  Yes    Chronic respiratory failure with hypoxia [J96.11]  Yes    ESRD on peritoneal dialysis [N18.6, Z99.2]  Not Applicable    End stage renal disease [N18.6]  Yes    Essential hypertension [I10]  Yes    Anemia due to chronic kidney disease [N18.9, D63.1]  Yes    Psoriasis [L40.9]  Yes     Monoclonal gammopathy of unknown significance (MGUS) [D47.2]  Yes    Hypothyroidism [E03.9]  Yes      Resolved Hospital Problems    Diagnosis Date Resolved POA    **Spontaneous bacterial peritonitis [K65.2] 04/09/2024 Yes    Peritoneal dialysis catheter in place [Z99.2] 04/09/2024 Not Applicable       80yo gentleman with sepsis/fever/hallucinations due to candidal SBP due to PD catheter.    Sepsis  PD catheter-related Candidal SBP  Continue Micafungin per ID  Outside cultures growing Candida parapsilosis sensitive to Micafungin  PD cath fluid cultures here growing yeast (prelim)  Blood cultures NGTD  PD catheter out 4/9  Afebrile now, HRs and BPs fine     Immunosuppressed (Skyrizi for psoriasis)  H/o CDiff colitis  Minimize abx exposure  Consider stopping Skyrizi altogether     ESRD  PD cath out 4/9 and right IJ HD cath placed  HD per Renal  K+ looks fine  Will need TDC when okay with ID    Hypomagnesemia  Mg++ onlyl 1.6 this AM, will replace again today     Anemia of CKD  Hgb stable  Monitor     DM2  Continue nightly Lantus at attenuated dose  Covering with SSI  A1c 6.6  Sugars acceptable     HypoT4  Continue L-T4  TSH quite high but free T4 1.16, no change to current dose, repeat TFTs when not acutely ill     Recent hospitalizations for aspiration PNA  Chronic hypoxic resp failure  Continue supplemental O2 as needed (on RA presently)  Incentive spirometer  Continue Brovana and PRN DuoNebs     HTN  BPs acceptable at present if a bit robust at times  Continue Coreg with holding parameters    Bipolar D/O  Continue Abilify, no amirah today       SCDs for DVT prophylaxis.  Full code.  Discussed with patient  Anticipate discharge  TBD        Shiraz Bolton MD  [facility]  04/11/24  09:12 EDT

## 2024-04-11 NOTE — PLAN OF CARE
Problem: Adult Inpatient Plan of Care  Goal: Plan of Care Review  Outcome: Ongoing, Progressing  Flowsheets (Taken 4/11/2024 1627)  Progress: improving  Plan of Care Reviewed With: patient   Goal Outcome Evaluation:  Plan of Care Reviewed With: patient        Progress: improving

## 2024-04-12 ENCOUNTER — ANESTHESIA (OUTPATIENT)
Dept: PERIOP | Facility: HOSPITAL | Age: 79
End: 2024-04-12
Payer: MEDICARE

## 2024-04-12 ENCOUNTER — APPOINTMENT (OUTPATIENT)
Dept: GENERAL RADIOLOGY | Facility: HOSPITAL | Age: 79
DRG: 907 | End: 2024-04-12
Payer: MEDICARE

## 2024-04-12 ENCOUNTER — ANESTHESIA EVENT (OUTPATIENT)
Dept: PERIOP | Facility: HOSPITAL | Age: 79
End: 2024-04-12
Payer: MEDICARE

## 2024-04-12 LAB
ALBUMIN SERPL-MCNC: 2.6 G/DL (ref 3.5–5.2)
ALBUMIN/GLOB SERPL: 0.8 G/DL
ALP SERPL-CCNC: 75 U/L (ref 39–117)
ALT SERPL W P-5'-P-CCNC: 12 U/L (ref 1–41)
ANION GAP SERPL CALCULATED.3IONS-SCNC: 11.2 MMOL/L (ref 5–15)
AST SERPL-CCNC: 22 U/L (ref 1–40)
BACTERIA FLD CULT: ABNORMAL
BASOPHILS # BLD AUTO: 0.01 10*3/MM3 (ref 0–0.2)
BASOPHILS NFR BLD AUTO: 0.2 % (ref 0–1.5)
BILIRUB SERPL-MCNC: 0.2 MG/DL (ref 0–1.2)
BUN SERPL-MCNC: 42 MG/DL (ref 8–23)
BUN/CREAT SERPL: 7.6 (ref 7–25)
CALCIUM SPEC-SCNC: 8.6 MG/DL (ref 8.6–10.5)
CHLORIDE SERPL-SCNC: 103 MMOL/L (ref 98–107)
CO2 SERPL-SCNC: 23.8 MMOL/L (ref 22–29)
CREAT SERPL-MCNC: 5.51 MG/DL (ref 0.76–1.27)
DEPRECATED RDW RBC AUTO: 47.9 FL (ref 37–54)
EGFRCR SERPLBLD CKD-EPI 2021: 9.9 ML/MIN/1.73
EOSINOPHIL # BLD AUTO: 0.47 10*3/MM3 (ref 0–0.4)
EOSINOPHIL NFR BLD AUTO: 8.2 % (ref 0.3–6.2)
ERYTHROCYTE [DISTWIDTH] IN BLOOD BY AUTOMATED COUNT: 14.2 % (ref 12.3–15.4)
GLOBULIN UR ELPH-MCNC: 3.3 GM/DL
GLUCOSE BLDC GLUCOMTR-MCNC: 110 MG/DL (ref 70–130)
GLUCOSE BLDC GLUCOMTR-MCNC: 110 MG/DL (ref 70–130)
GLUCOSE BLDC GLUCOMTR-MCNC: 140 MG/DL (ref 70–130)
GLUCOSE BLDC GLUCOMTR-MCNC: 159 MG/DL (ref 70–130)
GLUCOSE SERPL-MCNC: 148 MG/DL (ref 65–99)
GRAM STN SPEC: ABNORMAL
GRAM STN SPEC: ABNORMAL
HCT VFR BLD AUTO: 24.8 % (ref 37.5–51)
HGB BLD-MCNC: 7.8 G/DL (ref 13–17.7)
IMM GRANULOCYTES # BLD AUTO: 0.06 10*3/MM3 (ref 0–0.05)
IMM GRANULOCYTES NFR BLD AUTO: 1 % (ref 0–0.5)
LYMPHOCYTES # BLD AUTO: 1.46 10*3/MM3 (ref 0.7–3.1)
LYMPHOCYTES NFR BLD AUTO: 25.5 % (ref 19.6–45.3)
MAGNESIUM SERPL-MCNC: 2.4 MG/DL (ref 1.6–2.4)
MCH RBC QN AUTO: 29.3 PG (ref 26.6–33)
MCHC RBC AUTO-ENTMCNC: 31.5 G/DL (ref 31.5–35.7)
MCV RBC AUTO: 93.2 FL (ref 79–97)
MONOCYTES # BLD AUTO: 0.78 10*3/MM3 (ref 0.1–0.9)
MONOCYTES NFR BLD AUTO: 13.6 % (ref 5–12)
MYCOBACTERIUM SPEC CULT: NORMAL
NEUTROPHILS NFR BLD AUTO: 2.94 10*3/MM3 (ref 1.7–7)
NEUTROPHILS NFR BLD AUTO: 51.5 % (ref 42.7–76)
NIGHT BLUE STAIN TISS: NORMAL
NIGHT BLUE STAIN TISS: NORMAL
NRBC BLD AUTO-RTO: 0 /100 WBC (ref 0–0.2)
PHOSPHATE SERPL-MCNC: 3.1 MG/DL (ref 2.5–4.5)
PLATELET # BLD AUTO: 150 10*3/MM3 (ref 140–450)
PMV BLD AUTO: 9.8 FL (ref 6–12)
POTASSIUM SERPL-SCNC: 4.6 MMOL/L (ref 3.5–5.2)
PROT SERPL-MCNC: 5.9 G/DL (ref 6–8.5)
RBC # BLD AUTO: 2.66 10*6/MM3 (ref 4.14–5.8)
SODIUM SERPL-SCNC: 138 MMOL/L (ref 136–145)
WBC NRBC COR # BLD AUTO: 5.72 10*3/MM3 (ref 3.4–10.8)

## 2024-04-12 PROCEDURE — 85025 COMPLETE CBC W/AUTO DIFF WBC: CPT | Performed by: HOSPITALIST

## 2024-04-12 PROCEDURE — 76000 FLUOROSCOPY <1 HR PHYS/QHP: CPT

## 2024-04-12 PROCEDURE — 02HV33Z INSERTION OF INFUSION DEVICE INTO SUPERIOR VENA CAVA, PERCUTANEOUS APPROACH: ICD-10-PCS | Performed by: SURGERY

## 2024-04-12 PROCEDURE — 80053 COMPREHEN METABOLIC PANEL: CPT | Performed by: HOSPITALIST

## 2024-04-12 PROCEDURE — 25010000002 HEPARIN (PORCINE) PER 1000 UNITS: Performed by: INTERNAL MEDICINE

## 2024-04-12 PROCEDURE — C1894 INTRO/SHEATH, NON-LASER: HCPCS | Performed by: SURGERY

## 2024-04-12 PROCEDURE — 94799 UNLISTED PULMONARY SVC/PX: CPT

## 2024-04-12 PROCEDURE — 76937 US GUIDE VASCULAR ACCESS: CPT | Performed by: SURGERY

## 2024-04-12 PROCEDURE — 84100 ASSAY OF PHOSPHORUS: CPT | Performed by: HOSPITALIST

## 2024-04-12 PROCEDURE — 63710000001 INSULIN GLARGINE PER 5 UNITS: Performed by: SURGERY

## 2024-04-12 PROCEDURE — 25010000002 BUPIVACAINE (PF) 0.25 % SOLUTION 30 ML VIAL: Performed by: SURGERY

## 2024-04-12 PROCEDURE — 94761 N-INVAS EAR/PLS OXIMETRY MLT: CPT

## 2024-04-12 PROCEDURE — 25010000002 PROPOFOL 10 MG/ML EMULSION: Performed by: NURSE ANESTHETIST, CERTIFIED REGISTERED

## 2024-04-12 PROCEDURE — 82948 REAGENT STRIP/BLOOD GLUCOSE: CPT

## 2024-04-12 PROCEDURE — C1750 CATH, HEMODIALYSIS,LONG-TERM: HCPCS | Performed by: SURGERY

## 2024-04-12 PROCEDURE — 99233 SBSQ HOSP IP/OBS HIGH 50: CPT | Performed by: INTERNAL MEDICINE

## 2024-04-12 PROCEDURE — 77001 FLUOROGUIDE FOR VEIN DEVICE: CPT | Performed by: SURGERY

## 2024-04-12 PROCEDURE — 25010000002 CEFAZOLIN PER 500 MG: Performed by: SURGERY

## 2024-04-12 PROCEDURE — 25010000002 HEPARIN (PORCINE) PER 1000 UNITS: Performed by: SURGERY

## 2024-04-12 PROCEDURE — 94664 DEMO&/EVAL PT USE INHALER: CPT

## 2024-04-12 PROCEDURE — 25810000003 LACTATED RINGERS PER 1000 ML: Performed by: NURSE ANESTHETIST, CERTIFIED REGISTERED

## 2024-04-12 PROCEDURE — 63710000001 INSULIN LISPRO (HUMAN) PER 5 UNITS: Performed by: SURGERY

## 2024-04-12 PROCEDURE — 83735 ASSAY OF MAGNESIUM: CPT | Performed by: HOSPITALIST

## 2024-04-12 PROCEDURE — 36558 INSERT TUNNELED CV CATH: CPT | Performed by: SURGERY

## 2024-04-12 PROCEDURE — 25010000002 LIDOCAINE 1 % SOLUTION 20 ML VIAL: Performed by: SURGERY

## 2024-04-12 PROCEDURE — 0JH60XZ INSERTION OF TUNNELED VASCULAR ACCESS DEVICE INTO CHEST SUBCUTANEOUS TISSUE AND FASCIA, OPEN APPROACH: ICD-10-PCS | Performed by: SURGERY

## 2024-04-12 RX ORDER — HYDROCODONE BITARTRATE AND ACETAMINOPHEN 5; 325 MG/1; MG/1
1 TABLET ORAL ONCE AS NEEDED
Status: DISCONTINUED | OUTPATIENT
Start: 2024-04-12 | End: 2024-04-12

## 2024-04-12 RX ORDER — EPHEDRINE SULFATE 50 MG/ML
5 INJECTION, SOLUTION INTRAVENOUS ONCE AS NEEDED
Status: DISCONTINUED | OUTPATIENT
Start: 2024-04-12 | End: 2024-04-12

## 2024-04-12 RX ORDER — HYDROCODONE BITARTRATE AND ACETAMINOPHEN 7.5; 325 MG/1; MG/1
1 TABLET ORAL EVERY 4 HOURS PRN
Status: DISCONTINUED | OUTPATIENT
Start: 2024-04-12 | End: 2024-04-12

## 2024-04-12 RX ORDER — PROMETHAZINE HYDROCHLORIDE 25 MG/1
25 SUPPOSITORY RECTAL ONCE AS NEEDED
Status: DISCONTINUED | OUTPATIENT
Start: 2024-04-12 | End: 2024-04-12

## 2024-04-12 RX ORDER — PROMETHAZINE HYDROCHLORIDE 25 MG/1
25 TABLET ORAL ONCE AS NEEDED
Status: DISCONTINUED | OUTPATIENT
Start: 2024-04-12 | End: 2024-04-12

## 2024-04-12 RX ORDER — FLUMAZENIL 0.1 MG/ML
0.2 INJECTION INTRAVENOUS AS NEEDED
Status: DISCONTINUED | OUTPATIENT
Start: 2024-04-12 | End: 2024-04-12

## 2024-04-12 RX ORDER — DROPERIDOL 2.5 MG/ML
0.62 INJECTION, SOLUTION INTRAMUSCULAR; INTRAVENOUS
Status: DISCONTINUED | OUTPATIENT
Start: 2024-04-12 | End: 2024-04-12

## 2024-04-12 RX ORDER — LORAZEPAM 0.5 MG/1
0.5 TABLET ORAL NIGHTLY PRN
Status: DISCONTINUED | OUTPATIENT
Start: 2024-04-12 | End: 2024-04-14 | Stop reason: HOSPADM

## 2024-04-12 RX ORDER — LIDOCAINE HYDROCHLORIDE 20 MG/ML
INJECTION, SOLUTION INFILTRATION; PERINEURAL AS NEEDED
Status: DISCONTINUED | OUTPATIENT
Start: 2024-04-12 | End: 2024-04-12 | Stop reason: SURG

## 2024-04-12 RX ORDER — SODIUM CHLORIDE, SODIUM LACTATE, POTASSIUM CHLORIDE, CALCIUM CHLORIDE 600; 310; 30; 20 MG/100ML; MG/100ML; MG/100ML; MG/100ML
INJECTION, SOLUTION INTRAVENOUS CONTINUOUS PRN
Status: DISCONTINUED | OUTPATIENT
Start: 2024-04-12 | End: 2024-04-12 | Stop reason: SURG

## 2024-04-12 RX ORDER — NALOXONE HCL 0.4 MG/ML
0.2 VIAL (ML) INJECTION AS NEEDED
Status: DISCONTINUED | OUTPATIENT
Start: 2024-04-12 | End: 2024-04-12

## 2024-04-12 RX ORDER — FLUCONAZOLE 200 MG/1
400 TABLET ORAL
Status: DISCONTINUED | OUTPATIENT
Start: 2024-04-12 | End: 2024-04-14 | Stop reason: HOSPADM

## 2024-04-12 RX ORDER — HYDROMORPHONE HYDROCHLORIDE 1 MG/ML
0.25 INJECTION, SOLUTION INTRAMUSCULAR; INTRAVENOUS; SUBCUTANEOUS
Status: DISCONTINUED | OUTPATIENT
Start: 2024-04-12 | End: 2024-04-12

## 2024-04-12 RX ORDER — IPRATROPIUM BROMIDE AND ALBUTEROL SULFATE 2.5; .5 MG/3ML; MG/3ML
3 SOLUTION RESPIRATORY (INHALATION) ONCE AS NEEDED
Status: DISCONTINUED | OUTPATIENT
Start: 2024-04-12 | End: 2024-04-12

## 2024-04-12 RX ORDER — LABETALOL HYDROCHLORIDE 5 MG/ML
5 INJECTION, SOLUTION INTRAVENOUS
Status: DISCONTINUED | OUTPATIENT
Start: 2024-04-12 | End: 2024-04-12

## 2024-04-12 RX ORDER — HEPARIN SODIUM 1000 [USP'U]/ML
INJECTION, SOLUTION INTRAVENOUS; SUBCUTANEOUS AS NEEDED
Status: DISCONTINUED | OUTPATIENT
Start: 2024-04-12 | End: 2024-04-12 | Stop reason: HOSPADM

## 2024-04-12 RX ORDER — HYDRALAZINE HYDROCHLORIDE 20 MG/ML
5 INJECTION INTRAMUSCULAR; INTRAVENOUS
Status: DISCONTINUED | OUTPATIENT
Start: 2024-04-12 | End: 2024-04-12

## 2024-04-12 RX ORDER — FENTANYL CITRATE 50 UG/ML
25 INJECTION, SOLUTION INTRAMUSCULAR; INTRAVENOUS
Status: DISCONTINUED | OUTPATIENT
Start: 2024-04-12 | End: 2024-04-12

## 2024-04-12 RX ORDER — PROPOFOL 10 MG/ML
VIAL (ML) INTRAVENOUS AS NEEDED
Status: DISCONTINUED | OUTPATIENT
Start: 2024-04-12 | End: 2024-04-12 | Stop reason: SURG

## 2024-04-12 RX ORDER — HEPARIN SODIUM 1000 [USP'U]/ML
3800 INJECTION, SOLUTION INTRAVENOUS; SUBCUTANEOUS AS NEEDED
Status: DISCONTINUED | OUTPATIENT
Start: 2024-04-12 | End: 2024-04-14 | Stop reason: HOSPADM

## 2024-04-12 RX ORDER — ONDANSETRON 2 MG/ML
4 INJECTION INTRAMUSCULAR; INTRAVENOUS ONCE AS NEEDED
Status: DISCONTINUED | OUTPATIENT
Start: 2024-04-12 | End: 2024-04-12

## 2024-04-12 RX ORDER — DIPHENHYDRAMINE HYDROCHLORIDE 50 MG/ML
12.5 INJECTION INTRAMUSCULAR; INTRAVENOUS
Status: DISCONTINUED | OUTPATIENT
Start: 2024-04-12 | End: 2024-04-12

## 2024-04-12 RX ORDER — HYDROCODONE BITARTRATE AND ACETAMINOPHEN 5; 325 MG/1; MG/1
1 TABLET ORAL EVERY 4 HOURS PRN
Status: DISCONTINUED | OUTPATIENT
Start: 2024-04-12 | End: 2024-04-14 | Stop reason: HOSPADM

## 2024-04-12 RX ADMIN — LIDOCAINE HYDROCHLORIDE 40 MG: 20 INJECTION, SOLUTION INFILTRATION; PERINEURAL at 11:11

## 2024-04-12 RX ADMIN — ARIPIPRAZOLE 2 MG: 2 TABLET ORAL at 21:09

## 2024-04-12 RX ADMIN — INSULIN LISPRO 2 UNITS: 100 INJECTION, SOLUTION INTRAVENOUS; SUBCUTANEOUS at 21:07

## 2024-04-12 RX ADMIN — INSULIN GLARGINE 10 UNITS: 100 INJECTION, SOLUTION SUBCUTANEOUS at 21:07

## 2024-04-12 RX ADMIN — PROPOFOL 80 MCG/KG/MIN: 10 INJECTION, EMULSION INTRAVENOUS at 11:06

## 2024-04-12 RX ADMIN — Medication 3 MG: at 21:06

## 2024-04-12 RX ADMIN — HEPARIN SODIUM 3800 UNITS: 1000 INJECTION INTRAVENOUS; SUBCUTANEOUS at 16:39

## 2024-04-12 RX ADMIN — SODIUM CHLORIDE 2000 MG: 900 INJECTION INTRAVENOUS at 10:52

## 2024-04-12 RX ADMIN — LORAZEPAM 0.5 MG: 0.5 TABLET ORAL at 21:08

## 2024-04-12 RX ADMIN — Medication 250 MG: at 21:09

## 2024-04-12 RX ADMIN — SODIUM CHLORIDE, POTASSIUM CHLORIDE, SODIUM LACTATE AND CALCIUM CHLORIDE: 600; 310; 30; 20 INJECTION, SOLUTION INTRAVENOUS at 10:59

## 2024-04-12 RX ADMIN — CARVEDILOL 6.25 MG: 6.25 TABLET, FILM COATED ORAL at 21:08

## 2024-04-12 RX ADMIN — ARFORMOTEROL TARTRATE 15 MCG: 15 SOLUTION RESPIRATORY (INHALATION) at 08:28

## 2024-04-12 RX ADMIN — PROPOFOL 50 MG: 10 INJECTION, EMULSION INTRAVENOUS at 11:04

## 2024-04-12 NOTE — PROGRESS NOTES
" LOS: 3 days     Name: Nelson Wang  Age: 79 y.o.  Sex: male  :  1945  MRN: [id number]         Primary Care Physician: Janna Mo MD    Subjective   Subjective  No new complaints or acute overnight events.  Says he is going for tunneled dialysis catheter placement today at 9:00.    Objective   Vital Signs  Temp:  [97.5 °F (36.4 °C)-98 °F (36.7 °C)] 97.9 °F (36.6 °C)  Heart Rate:  [76-86] 78  Resp:  [14-20] 18  BP: (115-158)/(62-86) 148/68  Body mass index is 26.93 kg/m².    Objective:  General Appearance:  Comfortable and in no acute distress (Chronically ill-appearing).    Vital signs: (most recent): Blood pressure 148/68, pulse 78, temperature 97.9 °F (36.6 °C), temperature source Oral, resp. rate 18, height 170.2 cm (67\"), weight 78 kg (171 lb 15.3 oz), SpO2 93%.    HEENT: (Right nontunneled dialysis catheter in place)    Lungs:  Normal effort and normal respiratory rate.    Heart: Normal rate.  Regular rhythm.    Abdomen: Abdomen is soft.  Bowel sounds are normal.   There is no abdominal tenderness.     Extremities: There is no dependent edema or local swelling.    Neurological: Patient is alert and oriented to person, place and time.    Skin:  Warm and dry.                Results Review:       I reviewed the patient's new clinical results.    Results from last 7 days   Lab Units 04/12/24  0456 04/11/24  0640 04/10/24  0524  0653 24  2140   WBC 10*3/mm3 5.72 5.53 6.47 8.08 9.96   HEMOGLOBIN g/dL 7.8* 7.6* 7.6* 8.1* 8.6*   PLATELETS 10*3/mm3 150 148 136* 144 128*     Results from last 7 days   Lab Units 24  04524  0640 04/10/24  0553 24  0653 24  2140   SODIUM mmol/L 138 144 137 135* 134*   POTASSIUM mmol/L 4.6 4.5 4.1 3.9 4.2   CHLORIDE mmol/L 103 108* 100 100 95*   CO2 mmol/L 23.8 27.0 21.9* 22.0 24.3   BUN mg/dL 42* 30* 62* 42* 46*   CREATININE mg/dL 5.51* 5.82* 7.81* 6.66* 6.31*   CALCIUM mg/dL 8.6 8.3* 8.1* 7.4* 8.3*   GLUCOSE mg/dL 148* 119* 85 81 " 122*                 Scheduled Meds:   arformoterol, 15 mcg, Nebulization, BID - RT  ARIPiprazole, 2 mg, Oral, BID  atorvastatin, 40 mg, Oral, Daily  carvedilol, 6.25 mg, Oral, Nightly  insulin glargine, 10 Units, Subcutaneous, Nightly  insulin lispro, 2-7 Units, Subcutaneous, 4x Daily AC & at Bedtime  levothyroxine, 150 mcg, Oral, Q AM  micafungin (MYCAMINE) IV, 100 mg, Intravenous, Q24H  pantoprazole, 40 mg, Oral, Q AM  saccharomyces boulardii, 250 mg, Oral, BID  sevelamer, 800 mg, Oral, TID With Meals      PRN Meds:     acetaminophen    senna-docusate sodium **AND** polyethylene glycol **AND** bisacodyl **AND** bisacodyl    dextrose    dextrose    glucagon (human recombinant)    ipratropium-albuterol    melatonin    nitroglycerin    ondansetron ODT **OR** ondansetron    sodium chloride    sodium chloride    traMADol  Continuous Infusions:       Assessment & Plan   Active Hospital Problems    Diagnosis  POA    Sepsis [A41.9]  Yes    Type 2 diabetes mellitus, with long-term current use of insulin [E11.9, Z79.4]  Not Applicable    GERD (gastroesophageal reflux disease) [K21.9]  Yes    Immunosuppression due to drug therapy [D84.821, Z79.899]  Not Applicable    Bipolar disorder [F31.9]  Yes    Chronic respiratory failure with hypoxia [J96.11]  Yes    ESRD on peritoneal dialysis [N18.6, Z99.2]  Not Applicable    End stage renal disease [N18.6]  Yes    Essential hypertension [I10]  Yes    Anemia due to chronic kidney disease [N18.9, D63.1]  Yes    Psoriasis [L40.9]  Yes    Monoclonal gammopathy of unknown significance (MGUS) [D47.2]  Yes    Hypothyroidism [E03.9]  Yes      Resolved Hospital Problems    Diagnosis Date Resolved POA    **Spontaneous bacterial peritonitis [K65.2] 04/09/2024 Yes    Peritoneal dialysis catheter in place [Z99.2] 04/09/2024 Not Applicable       Assessment & Plan    78yo gentleman with sepsis/fever/hallucinations due to candidal SBP due to PD catheter.     Sepsis  PD catheter-related Candidal  SBP  Continue Micafungin per ID  Outside cultures growing Candida parapsilosis sensitive to Micafungin  PD cath fluid cultures here growing yeast (prelim)  Blood cultures NGTD  PD catheter out 4/9     Immunosuppressed (Skyrizi for psoriasis)  H/o CDiff colitis  Minimize abx exposure  Holding Skyrizi and consider stopping altogether     ESRD  PD cath out 4/9 and right IJ HD cath placed  HD per Renal  K+ looks fine  Dangelo noted for placement of tunneled dialysis catheter     Hypomagnesemia  Monitor and replace as needed     Anemia of CKD  Hgb stable  Monitor     DM2  Blood sugars reasonably controlled  Continue nightly Lantus at attenuated dose  Covering with SSI  A1c 6.6     HypoT4  Continue L-T4  TSH quite high but free T4 1.16, no change to current dose, repeat TFTs when not acutely ill     Recent hospitalizations for aspiration PNA  Chronic hypoxic resp failure  Continue supplemental O2 as needed (on RA presently)  Incentive spirometer  Continue Brovana and PRN DuoNebs     HTN  BPs acceptable at present   Continue Coreg with holding parameters     Bipolar D/O  Continue Abilify, no amirah today        SCDs for DVT prophylaxis.  Full code.  Discussed with patient  Anticipate discharge  TBD        Expected discharge date/ time has not been documented.     Cristian Santacruz MD  [facility]  04/12/24  09:14 EDT

## 2024-04-12 NOTE — NURSING NOTE
Spoke with Dr. Santacruz about pt refusing tele and O2 sensor. It was determined the only way to get pt to comply would be to restrain him again. In an effort to avoid restraints, MD has ok'd pt to be off tele and O2 sensor.

## 2024-04-12 NOTE — ANESTHESIA POSTPROCEDURE EVALUATION
Patient: Nelson Wang    Procedure Summary       Date: 04/12/24 Room / Location: [facility] OR  / [facility] MAIN OR    Anesthesia Start: 1058 Anesthesia Stop: 1137    Procedure: Tunneled hemodialysis catheter insertion Diagnosis:       End stage renal disease      (End stage renal disease [N18.6])    Surgeons: Enrique Vinson MD Provider: Veronica Huntley MD    Anesthesia Type: MAC ASA Status: 3            Anesthesia Type: MAC    Vitals  Vitals Value Taken Time   /100 04/12/24 1230   Temp 36.8 °C (98.2 °F) 04/12/24 1140   Pulse 72 04/12/24 1232   Resp 16 04/12/24 1140   SpO2 97 % 04/12/24 1232   Vitals shown include unfiled device data.        Post Anesthesia Care and Evaluation    Patient location during evaluation: bedside  Patient participation: complete - patient participated  Level of consciousness: awake and alert  Pain management: adequate    Airway patency: patent  Anesthetic complications: No anesthetic complications  PONV Status: controlled  Cardiovascular status: acceptable  Respiratory status: acceptable  Hydration status: acceptable

## 2024-04-12 NOTE — PLAN OF CARE
Goal Outcome Evaluation:              Outcome Evaluation: Patient a/o x4, with some confusion noted this shift. NPO at 0000 for sx, safety sitter at bedside at 2300 as pt has been impulsive, easily redirected. All meds given as ordered. This RN spoke to daughter this shift, she denies any hx of dementia, states he was a/o x4, drives and indep at baseline.  No new issues noted. This RN wore appropriate PPE during care. See v/s and labs.

## 2024-04-12 NOTE — ANESTHESIA PREPROCEDURE EVALUATION
Anesthesia Evaluation     Patient summary reviewed and Nursing notes reviewed   NPO Solid Status: > 8 hours  NPO Liquid Status: > 2 hours           Airway   Mallampati: II  TM distance: >3 FB  Neck ROM: full  No difficulty expected  Comment: Grade I view with Myrick 2  Dental    (+) poor dentition    Pulmonary - normal exam    breath sounds clear to auscultation  (+) a smoker Former,    ROS comment: Hx aspiration/hx acute on chronic respiratory failure with hypoxia  Cardiovascular - normal exam    ECG reviewed  Rhythm: regular  Rate: normal    (+) hypertension less than 2 medications, hyperlipidemia    ROS comment: EF 57%, mild AS by ECHO 2/24    Neuro/Psych  (+) psychiatric history Bipolar  GI/Hepatic/Renal/Endo    (+) GERD, renal disease- ESRD, diabetes mellitus type 2 using insulin, thyroid problem hypothyroidism    Musculoskeletal     Abdominal   (+) scaphoid   Substance History      OB/GYN          Other   blood dyscrasia anemia,       Other Comment: Hgb 7.8                Anesthesia Plan    ASA 3     MAC     intravenous induction     Anesthetic plan, risks, benefits, and alternatives have been provided, discussed and informed consent has been obtained with: patient.    CODE STATUS:    Code Status (Patient has no pulse and is not breathing): CPR (Attempt to Resuscitate)  Medical Interventions (Patient has pulse or is breathing): Full Support

## 2024-04-12 NOTE — NURSING NOTE
HD completed without incident or complaints. 2L removed Patient tolerated well. Patient stable post TX

## 2024-04-12 NOTE — OP NOTE
Operative Note  Location: [facility]  Date of Admission:  4/8/2024  OR Date: 4/12/2024    Pre-op Diagnosis:   End stage renal disease [N18.6]    Post-op Diagnosis:     Same    Procedure:  1) Tunneled dialysis catheter insertion   2) Ultrasound guided vascular access  3) Radiologic supervision of catheter tip placement    Surgeon: Enrique Vinson MD    Assistant: Loulou BRYANT    Anesthesia: Monitored Anesthesia Care    Estimated Blood Loss: minimal    Staff:   Circulator: Farzana Noriega RN  Radiology Technologist: Jayden Johnson  Scrermelinda Person: John Schofield  Assistant: Loulou Beavers CSA    Complications: None    Specimen: None    Findings:  Tip in SVC/Atrial     Implants: Nothing was implanted during the procedure    Indications:    The patient is an 79 y.o. male referred for tunneled dialysis catheter placement.  The risks, benefits, and alternatives have been discussed with the patient and/or family and include but are not limited to injury to artery, vein, lung, bleeding, and infection.    Procedure:  The patient was taken to the operating room and placed supine on the operating room table. After the induction of IV sedation, the neck and chest were prepped and draped with ChloraPrep. The patient was placed in Trendelenburg position. Timeout was observed. The right  Internal Jugular  was evaluated on ultrasound and found to be easily compressible. The vessel was entered under direct ultrasound guidance and photographic documentation was recorded in the chart for the record. An angled wire was then advanced down into the superior vena cava under fluoroscopy without any difficulty. Exit site was chosen on the chest. The tract was anesthetized with 1% lidocaine mixed with 0.25% Marcaine. A 23 cm catheter was then flushed and brought up onto the field. It was tunneled in an antegrade fashion up to the IJ insertion site. Dilator and peel-away sheath were then advanced over the wire under  fluoroscopy without any significant difficulty. Dilator and wire were removed leaving the peel-away sheath in place. The distal tip of the catheter was then placed into the peel-away sheath and the peel-away sheath was removed. Under fluoroscopy, the distal tip of the catheter was positioned into the right atrium. There was no kinking at the catheter insertion site. The catheter flushed and aspirated easily. The lung fields were examined. There was no evidence of hemothorax or pneumothorax. The catheter flushed and aspirated quite easily. It was locked with concentrated heparin. The catheter was then anchored to the chest with nylon sutures. A stitch and dermal glue were used to close the neck site as well.  Prior to ablation dressings Shiley catheter that was in stick site above this was removed with a 3-0 Vicryl suture placed for hemostasis.  Sterile dressings were applied. The patient tolerated the procedure well. There were no immediately apparent complications.           Active Hospital Problems    Diagnosis  POA    Sepsis [A41.9]  Yes    Type 2 diabetes mellitus, with long-term current use of insulin [E11.9, Z79.4]  Not Applicable    GERD (gastroesophageal reflux disease) [K21.9]  Yes    Immunosuppression due to drug therapy [D84.821, Z79.899]  Not Applicable    Bipolar disorder [F31.9]  Yes    Chronic respiratory failure with hypoxia [J96.11]  Yes    ESRD on peritoneal dialysis [N18.6, Z99.2]  Not Applicable    End stage renal disease [N18.6]  Yes    Essential hypertension [I10]  Yes    Anemia due to chronic kidney disease [N18.9, D63.1]  Yes    Psoriasis [L40.9]  Yes    Monoclonal gammopathy of unknown significance (MGUS) [D47.2]  Yes    Hypothyroidism [E03.9]  Yes      Resolved Hospital Problems    Diagnosis Date Resolved POA    **Spontaneous bacterial peritonitis [K65.2] 04/09/2024 Yes    Peritoneal dialysis catheter in place [Z99.2] 04/09/2024 Not Applicable      Enrique Vinson MD     Date: 4/12/2024   Time: 11:38 EDT

## 2024-04-12 NOTE — PROGRESS NOTES
ID note for peritonitis  S: No sig pain. Tolerating abx. AF    Physical Exam:   Vital Signs   Temp:  [97.5 °F (36.4 °C)-98 °F (36.7 °C)] 97.9 °F (36.6 °C)  Heart Rate:  [76-86] 78  Resp:  [14-20] 18  BP: (115-158)/(62-86) 148/68    GENERAL: Awake and alert, sickly  HEENT: Oropharynx is clear. Hearing is grossly normal.   EYES: . No conjunctival injection. No lid lag.   LUNGS:normal respiratory effort.   SKIN: no cutaneous eruptions in exposed areas  Abd soft and not ttp    Results Review:  White count 5.5  Creatinine 5.8  Glc 119-227    Microbiology:  Blood cultures ngtd  Respiratory pathogen panel neg  4/5 peritoneal fluid with 1745 white blood cells: candida parapsilosis  4/9 Peritoneal cx: yeast    A/p  1.  Sepsis/PD catheter associated peritonitis  2.  End-stage renal disease  3.  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  Recent E. coli bacteremia from pneumonia treated at [facility]   5.  Immunosuppression from treatment of psoriasis    His outpatient culture grew Candida parapsilosis and cx here also with yeast  S/p removal of PD catheter (Jamal) and placement of shiley dialysis catheter (Domingo) on 4/9  Plan TDC today  Parapsilosis was sensitive to fluconazole. Will change to this 400mg po MWF after HD through 5/1  No objection to dc okay with others  Would consider to stopping psoriatic medication given multiple infectious complications in last 2-3 mo  D/w Dr Santacruz re impressions and plans  With abx plan in place we will not plan to see daily.

## 2024-04-12 NOTE — PROGRESS NOTES
[facility] Progress Note      Patient Name: Nelson Wang  : 1945  MRN: [id number]  Primary Care Physician:  Janna Mo MD  Date of admission: 2024    Subjective     Interval History:   The patient was seen and examined today for follow-up on ESRD who is recently switch from PD to hemodialysis given fungal peritonitis    Pt had his first hemodialysis on 4/10/2024 through nontunneled right IJ,  Cleared by ID for placement of tunneled dialysis catheter today    Denies chest pain, SOA, nausea or vomiting.  Afebrile.    Review of Systems:   As noted above    Objective     Vitals:   Temp:  [97.5 °F (36.4 °C)-98 °F (36.7 °C)] 97.8 °F (36.6 °C)  Heart Rate:  [75-86] 78  Resp:  [14-20] 18  BP: (115-158)/(62-86) 148/68    Intake/Output Summary (Last 24 hours) at 2024 1138  Last data filed at 2024 1638  Gross per 24 hour   Intake 480 ml   Output --   Net 480 ml       Physical Exam:    General Appearance: More awake and oriented.  Skin: warm and dry  HEENT: oral mucosa normal, nonicteric sclera  Neck: supple, no JVD  Lungs: CTA  Heart: RRR, normal S1 and S2  Abdomen: Mildly tender  : no palpable bladder  Extremities: no edema, cyanosis or clubbing  Neuro: normal speech and mental status   Right IJ dialysis catheter with no erythema and no discharge,  there is no signs of infection  Scheduled Meds:     [Transfer Hold] arformoterol, 15 mcg, Nebulization, BID - RT  [Transfer Hold] ARIPiprazole, 2 mg, Oral, BID  [Transfer Hold] atorvastatin, 40 mg, Oral, Daily  carvedilol, 6.25 mg, Oral, Nightly  fluconazole, 400 mg, Oral, Once per day on   [Transfer Hold] insulin glargine, 10 Units, Subcutaneous, Nightly  [Transfer Hold] insulin lispro, 2-7 Units, Subcutaneous, 4x Daily AC & at Bedtime  [Transfer Hold] levothyroxine, 150 mcg, Oral, Q AM  [Transfer Hold] pantoprazole, 40 mg, Oral, Q AM  [Transfer Hold] saccharomyces boulardii, 250 mg, Oral,  BID  [Transfer Hold] sevelamer, 800 mg, Oral, TID With Meals      IV Meds:        Results Reviewed:   I have personally reviewed the results from the time of this admission to 4/12/2024 11:38 EDT     Results from last 7 days   Lab Units 04/12/24  0456 04/11/24  0640 04/10/24  0553   SODIUM mmol/L 138 144 137   POTASSIUM mmol/L 4.6 4.5 4.1   CHLORIDE mmol/L 103 108* 100   CO2 mmol/L 23.8 27.0 21.9*   BUN mg/dL 42* 30* 62*   CREATININE mg/dL 5.51* 5.82* 7.81*   CALCIUM mg/dL 8.6 8.3* 8.1*   BILIRUBIN mg/dL 0.2 0.2 0.3   ALK PHOS U/L 75 55 53   ALT (SGPT) U/L 12 12 9   AST (SGOT) U/L 22 20 22   GLUCOSE mg/dL 148* 119* 85       Estimated Creatinine Clearance: 12.2 mL/min (A) (by C-G formula based on SCr of 5.51 mg/dL (H)).    Results from last 7 days   Lab Units 04/12/24  0456 04/11/24  0640 04/10/24  0553   MAGNESIUM mg/dL 2.4 1.6 1.4*   PHOSPHORUS mg/dL 3.1 3.9 7.2*             Results from last 7 days   Lab Units 04/12/24  0456 04/11/24  0640 04/10/24  0553 04/09/24  0653 04/08/24  2140   WBC 10*3/mm3 5.72 5.53 6.47 8.08 9.96   HEMOGLOBIN g/dL 7.8* 7.6* 7.6* 8.1* 8.6*   PLATELETS 10*3/mm3 150 148 136* 144 128*             Assessment / Plan     ASSESSMENT:  End-stage renal disease was  on peritoneal dialysis now complicated with peritoneal dialysis related candidal peritonitis .  Now status post PD catheter removal on 4/9/2024.   Peritoneal dialysis related peritonitis.  Culture showing Candida parapsilosis on Diflucan  Hypertension with CKD: Blood pressure acceptable  Type 2 diabetes mellitus with CKD   Hyperphosphatemia on binders  History of hypomagnesemia   Sepsis secondary to peritoneal dialysis peritonitis        PLAN:  Patient is hemodynamically stable.  Plan noted for tunneled dialysis cath placement today.  Will dialyze today per schedule attempt UF as tolerated  Consult case management for placement    Thank you for involving us in the care of Nelson Wang.  Please feel free to call with any  questions.    Dayne Christy MD   04/12/24  11:38 EDT    [facility]  [phone]    Parts of this note may be an electronic transcription/translation of spoken language to printed text using the Dragon dictation system.

## 2024-04-13 ENCOUNTER — APPOINTMENT (OUTPATIENT)
Dept: CARDIOLOGY | Facility: HOSPITAL | Age: 79
DRG: 907 | End: 2024-04-13
Payer: MEDICARE

## 2024-04-13 LAB
ALBUMIN SERPL-MCNC: 2.7 G/DL (ref 3.5–5.2)
ANION GAP SERPL CALCULATED.3IONS-SCNC: 11.3 MMOL/L (ref 5–15)
BACTERIA SPEC AEROBE CULT: NORMAL
BACTERIA SPEC AEROBE CULT: NORMAL
BASOPHILS # BLD AUTO: 0.02 10*3/MM3 (ref 0–0.2)
BASOPHILS NFR BLD AUTO: 0.3 % (ref 0–1.5)
BH CV UPPER VENOUS LEFT AXILLARY AUGMENT: NORMAL
BH CV UPPER VENOUS LEFT AXILLARY COMPRESS: NORMAL
BH CV UPPER VENOUS LEFT AXILLARY PHASIC: NORMAL
BH CV UPPER VENOUS LEFT AXILLARY SPONT: NORMAL
BH CV UPPER VENOUS LEFT BASILIC FOREARM COMPRESS: NORMAL
BH CV UPPER VENOUS LEFT BASILIC UPPER COMPRESS: NORMAL
BH CV UPPER VENOUS LEFT BRACHIAL COMPRESS: NORMAL
BH CV UPPER VENOUS LEFT CEPHALIC FOREARM COMPRESS: NORMAL
BH CV UPPER VENOUS LEFT CEPHALIC UPPER COMPRESS: NORMAL
BH CV UPPER VENOUS LEFT INTERNAL JUGULAR AUGMENT: NORMAL
BH CV UPPER VENOUS LEFT INTERNAL JUGULAR COMPRESS: NORMAL
BH CV UPPER VENOUS LEFT INTERNAL JUGULAR PHASIC: NORMAL
BH CV UPPER VENOUS LEFT INTERNAL JUGULAR SPONT: NORMAL
BH CV UPPER VENOUS LEFT RADIAL COMPRESS: NORMAL
BH CV UPPER VENOUS LEFT SUBCLAVIAN AUGMENT: NORMAL
BH CV UPPER VENOUS LEFT SUBCLAVIAN COMPRESS: NORMAL
BH CV UPPER VENOUS LEFT SUBCLAVIAN PHASIC: NORMAL
BH CV UPPER VENOUS LEFT SUBCLAVIAN SPONT: NORMAL
BH CV UPPER VENOUS LEFT ULNAR COMPRESS: NORMAL
BH CV UPPER VENOUS RIGHT AXILLARY AUGMENT: NORMAL
BH CV UPPER VENOUS RIGHT AXILLARY COMPRESS: NORMAL
BH CV UPPER VENOUS RIGHT AXILLARY PHASIC: NORMAL
BH CV UPPER VENOUS RIGHT AXILLARY SPONT: NORMAL
BH CV UPPER VENOUS RIGHT BASILIC FOREARM COMPRESS: NORMAL
BH CV UPPER VENOUS RIGHT BASILIC UPPER COMPRESS: NORMAL
BH CV UPPER VENOUS RIGHT BRACHIAL COMPRESS: NORMAL
BH CV UPPER VENOUS RIGHT CEPHALIC FOREARM COMPRESS: NORMAL
BH CV UPPER VENOUS RIGHT CEPHALIC UPPER COMPRESS: NORMAL
BH CV UPPER VENOUS RIGHT INTERNAL JUGULAR AUGMENT: NORMAL
BH CV UPPER VENOUS RIGHT INTERNAL JUGULAR COMPRESS: NORMAL
BH CV UPPER VENOUS RIGHT INTERNAL JUGULAR PHASIC: NORMAL
BH CV UPPER VENOUS RIGHT INTERNAL JUGULAR SPONT: NORMAL
BH CV UPPER VENOUS RIGHT RADIAL COMPRESS: NORMAL
BH CV UPPER VENOUS RIGHT SUBCLAVIAN AUGMENT: NORMAL
BH CV UPPER VENOUS RIGHT SUBCLAVIAN COMPRESS: NORMAL
BH CV UPPER VENOUS RIGHT SUBCLAVIAN PHASIC: NORMAL
BH CV UPPER VENOUS RIGHT SUBCLAVIAN SPONT: NORMAL
BH CV UPPER VENOUS RIGHT ULNAR COMPRESS: NORMAL
BH CV VAS MEAS BASILIC ANTECUBITAL FOSSA LEFT: 0.26 CM
BH CV VAS MEAS BASILIC ANTECUBITAL FOSSA RIGHT: 0.42 CM
BH CV VAS MEAS BASILIC FOREARM LEFT - DIST: 0.07 CM
BH CV VAS MEAS BASILIC FOREARM LEFT - MID: 0.07 CM
BH CV VAS MEAS BASILIC FOREARM LEFT - PROX: 0.17 CM
BH CV VAS MEAS BASILIC FOREARM RIGHT - DIST: 0.1 CM
BH CV VAS MEAS BASILIC FOREARM RIGHT - MID: 0.11 CM
BH CV VAS MEAS BASILIC FOREARM RIGHT - PROX: 0.18 CM
BH CV VAS MEAS BASILIC UPPER ARM LEFT - DIST: 0.35 CM
BH CV VAS MEAS BASILIC UPPER ARM LEFT - MID: 0.44 CM
BH CV VAS MEAS BASILIC UPPER ARM LEFT - PROX: 0.45 CM
BH CV VAS MEAS BASILIC UPPER ARM RIGHT - DIST: 0.4 CM
BH CV VAS MEAS BASILIC UPPER ARM RIGHT - MID: 0.32 CM
BH CV VAS MEAS BASILIC UPPER ARM RIGHT - PROX: 0.4 CM
BH CV VAS MEAS CEPHALIC ANTECUBITAL FOSSA LEFT: 0.3 CM
BH CV VAS MEAS CEPHALIC ANTECUBITAL FOSSA RIGHT: 0.45 CM
BH CV VAS MEAS CEPHALIC FOREARM LEFT - DIST: 0.11 CM
BH CV VAS MEAS CEPHALIC FOREARM LEFT - MID: 0.12 CM
BH CV VAS MEAS CEPHALIC FOREARM LEFT - PROX: 0.11 CM
BH CV VAS MEAS CEPHALIC FOREARM RIGHT - DIST: 0.17 CM
BH CV VAS MEAS CEPHALIC FOREARM RIGHT - MID: 0.21 CM
BH CV VAS MEAS CEPHALIC FOREARM RIGHT - PROX: 0.19 CM
BH CV VAS MEAS CEPHALIC UPPER ARM LEFT - DIST: 0.32 CM
BH CV VAS MEAS CEPHALIC UPPER ARM LEFT - MID: 0.26 CM
BH CV VAS MEAS CEPHALIC UPPER ARM LEFT - PROX: 0.26 CM
BH CV VAS MEAS CEPHALIC UPPER ARM RIGHT - DIST: 0.27 CM
BH CV VAS MEAS CEPHALIC UPPER ARM RIGHT - MID: 0.27 CM
BH CV VAS MEAS CEPHALIC UPPER ARM RIGHT - PROX: 0.35 CM
BH CV VAS MEAS RADIAL UPPER ARM LEFT - DIST: 0.19 CM
BH CV VAS MEAS RADIAL UPPER ARM LEFT - MID: 0.23 CM
BH CV VAS MEAS RADIAL UPPER ARM LEFT - PROX: 0.2 CM
BH CV VAS MEAS RADIAL UPPER ARM RIGHT - DIST: 0.21 CM
BH CV VAS MEAS RADIAL UPPER ARM RIGHT - MID: 0.28 CM
BH CV VAS MEAS RADIAL UPPER ARM RIGHT - PROX: 0.26 CM
BUN SERPL-MCNC: 22 MG/DL (ref 8–23)
BUN/CREAT SERPL: 6 (ref 7–25)
CALCIUM SPEC-SCNC: 8.8 MG/DL (ref 8.6–10.5)
CHLORIDE SERPL-SCNC: 105 MMOL/L (ref 98–107)
CO2 SERPL-SCNC: 22.7 MMOL/L (ref 22–29)
CREAT SERPL-MCNC: 3.68 MG/DL (ref 0.76–1.27)
DEPRECATED RDW RBC AUTO: 50 FL (ref 37–54)
EGFRCR SERPLBLD CKD-EPI 2021: 16.1 ML/MIN/1.73
EOSINOPHIL # BLD AUTO: 0.37 10*3/MM3 (ref 0–0.4)
EOSINOPHIL NFR BLD AUTO: 6.3 % (ref 0.3–6.2)
ERYTHROCYTE [DISTWIDTH] IN BLOOD BY AUTOMATED COUNT: 14.4 % (ref 12.3–15.4)
GLUCOSE BLDC GLUCOMTR-MCNC: 118 MG/DL (ref 70–130)
GLUCOSE BLDC GLUCOMTR-MCNC: 122 MG/DL (ref 70–130)
GLUCOSE BLDC GLUCOMTR-MCNC: 251 MG/DL (ref 70–130)
GLUCOSE BLDC GLUCOMTR-MCNC: 79 MG/DL (ref 70–130)
GLUCOSE SERPL-MCNC: 87 MG/DL (ref 65–99)
HCT VFR BLD AUTO: 26.6 % (ref 37.5–51)
HGB BLD-MCNC: 8.5 G/DL (ref 13–17.7)
IMM GRANULOCYTES # BLD AUTO: 0.15 10*3/MM3 (ref 0–0.05)
IMM GRANULOCYTES NFR BLD AUTO: 2.6 % (ref 0–0.5)
LYMPHOCYTES # BLD AUTO: 1.87 10*3/MM3 (ref 0.7–3.1)
LYMPHOCYTES NFR BLD AUTO: 31.8 % (ref 19.6–45.3)
MCH RBC QN AUTO: 30.6 PG (ref 26.6–33)
MCHC RBC AUTO-ENTMCNC: 32 G/DL (ref 31.5–35.7)
MCV RBC AUTO: 95.7 FL (ref 79–97)
MONOCYTES # BLD AUTO: 0.9 10*3/MM3 (ref 0.1–0.9)
MONOCYTES NFR BLD AUTO: 15.3 % (ref 5–12)
NEUTROPHILS NFR BLD AUTO: 2.57 10*3/MM3 (ref 1.7–7)
NEUTROPHILS NFR BLD AUTO: 43.7 % (ref 42.7–76)
NRBC BLD AUTO-RTO: 0 /100 WBC (ref 0–0.2)
PHOSPHATE SERPL-MCNC: 3.5 MG/DL (ref 2.5–4.5)
PLATELET # BLD AUTO: 165 10*3/MM3 (ref 140–450)
PMV BLD AUTO: 9.8 FL (ref 6–12)
POTASSIUM SERPL-SCNC: 4.5 MMOL/L (ref 3.5–5.2)
RBC # BLD AUTO: 2.78 10*6/MM3 (ref 4.14–5.8)
SODIUM SERPL-SCNC: 139 MMOL/L (ref 136–145)
UPPER ARTERIAL LEFT ARM BRACHIAL LENGTH: 0.43 CM
UPPER ARTERIAL RIGHT ARM BRACHIAL LENGTH: 0.39 CM
WBC NRBC COR # BLD AUTO: 5.88 10*3/MM3 (ref 3.4–10.8)

## 2024-04-13 PROCEDURE — 85025 COMPLETE CBC W/AUTO DIFF WBC: CPT | Performed by: SURGERY

## 2024-04-13 PROCEDURE — 94761 N-INVAS EAR/PLS OXIMETRY MLT: CPT

## 2024-04-13 PROCEDURE — 94799 UNLISTED PULMONARY SVC/PX: CPT

## 2024-04-13 PROCEDURE — 80069 RENAL FUNCTION PANEL: CPT | Performed by: SURGERY

## 2024-04-13 PROCEDURE — 93985 DUP-SCAN HEMO COMPL BI STD: CPT

## 2024-04-13 PROCEDURE — 93985 DUP-SCAN HEMO COMPL BI STD: CPT | Performed by: SURGERY

## 2024-04-13 PROCEDURE — 94760 N-INVAS EAR/PLS OXIMETRY 1: CPT

## 2024-04-13 PROCEDURE — 99024 POSTOP FOLLOW-UP VISIT: CPT | Performed by: SURGERY

## 2024-04-13 PROCEDURE — 63710000001 INSULIN GLARGINE PER 5 UNITS: Performed by: SURGERY

## 2024-04-13 PROCEDURE — 94664 DEMO&/EVAL PT USE INHALER: CPT

## 2024-04-13 PROCEDURE — 82948 REAGENT STRIP/BLOOD GLUCOSE: CPT

## 2024-04-13 PROCEDURE — 63710000001 INSULIN LISPRO (HUMAN) PER 5 UNITS: Performed by: SURGERY

## 2024-04-13 RX ADMIN — ARIPIPRAZOLE 2 MG: 2 TABLET ORAL at 08:37

## 2024-04-13 RX ADMIN — SEVELAMER CARBONATE 800 MG: 800 TABLET, FILM COATED ORAL at 11:25

## 2024-04-13 RX ADMIN — Medication 250 MG: at 20:29

## 2024-04-13 RX ADMIN — ARFORMOTEROL TARTRATE 15 MCG: 15 SOLUTION RESPIRATORY (INHALATION) at 06:57

## 2024-04-13 RX ADMIN — INSULIN GLARGINE 10 UNITS: 100 INJECTION, SOLUTION SUBCUTANEOUS at 21:45

## 2024-04-13 RX ADMIN — SEVELAMER CARBONATE 800 MG: 800 TABLET, FILM COATED ORAL at 18:15

## 2024-04-13 RX ADMIN — LEVOTHYROXINE SODIUM 150 MCG: 150 TABLET ORAL at 06:37

## 2024-04-13 RX ADMIN — CARVEDILOL 6.25 MG: 6.25 TABLET, FILM COATED ORAL at 20:30

## 2024-04-13 RX ADMIN — ARIPIPRAZOLE 2 MG: 2 TABLET ORAL at 20:29

## 2024-04-13 RX ADMIN — Medication 250 MG: at 08:37

## 2024-04-13 RX ADMIN — SEVELAMER CARBONATE 800 MG: 800 TABLET, FILM COATED ORAL at 08:37

## 2024-04-13 RX ADMIN — ARFORMOTEROL TARTRATE 15 MCG: 15 SOLUTION RESPIRATORY (INHALATION) at 19:36

## 2024-04-13 RX ADMIN — ATORVASTATIN CALCIUM 40 MG: 20 TABLET, FILM COATED ORAL at 08:37

## 2024-04-13 RX ADMIN — Medication 3 MG: at 22:26

## 2024-04-13 RX ADMIN — INSULIN LISPRO 4 UNITS: 100 INJECTION, SOLUTION INTRAVENOUS; SUBCUTANEOUS at 18:15

## 2024-04-13 RX ADMIN — PANTOPRAZOLE SODIUM 40 MG: 40 TABLET, DELAYED RELEASE ORAL at 06:37

## 2024-04-13 NOTE — PROGRESS NOTES
[facility] Progress Note      Patient Name: Nelson Wang  : 1945  MRN: [id number]  Primary Care Physician:  Janna Mo MD  Date of admission: 2024    Subjective     Interval History:   Follow-up ESRD   Recently switched from PD to hemodialysis given fungal peritonitis  First hemodialysis on 4/10; TDC placed   Had HD yesterday tube removed    Denies chest pain, SOA, nausea or vomiting; no abdominal pain  Eager for discharge    Review of Systems:   As noted above    Objective     Vitals:   Temp:  [97.3 °F (36.3 °C)-98.7 °F (37.1 °C)] 98.7 °F (37.1 °C)  Heart Rate:  [71-86] 80  Resp:  [16-18] 18  BP: (119-168)/() 168/80  Flow (L/min):  [2] 2    Intake/Output Summary (Last 24 hours) at 2024 1138  Last data filed at 2024 1700  Gross per 24 hour   Intake --   Output 2600 ml   Net -2600 ml       Physical Exam:    General Appearance: Awake, appropriate  Skin: warm and dry; extensive vitiligo  HEENT: oral mucosa normal, nonicteric sclera  Neck: supple, no JVD  Lungs: Diffuse crackles; not labored on room air  Heart: RRR, normal S1 and S2  Abdomen: Distended, not tender, bandages C/D/I; PDC removed  : no palpable bladder  Extremities: no edema, cyanosis or clubbing  Neuro: normal speech  Vascular: Right IJ TDC       Scheduled Meds:     arformoterol, 15 mcg, Nebulization, BID - RT  ARIPiprazole, 2 mg, Oral, BID  atorvastatin, 40 mg, Oral, Daily  carvedilol, 6.25 mg, Oral, Nightly  fluconazole, 400 mg, Oral, Once per day on   insulin glargine, 10 Units, Subcutaneous, Nightly  insulin lispro, 2-7 Units, Subcutaneous, 4x Daily AC & at Bedtime  levothyroxine, 150 mcg, Oral, Q AM  pantoprazole, 40 mg, Oral, Q AM  saccharomyces boulardii, 250 mg, Oral, BID  sevelamer, 800 mg, Oral, TID With Meals      IV Meds:        Results Reviewed:   I have personally reviewed the results from the time of this admission to 2024 11:38 EDT      Results from last 7 days   Lab Units 04/13/24  0714 04/12/24  0456 04/11/24  0640 04/10/24  0553   SODIUM mmol/L 139 138 144 137   POTASSIUM mmol/L 4.5 4.6 4.5 4.1   CHLORIDE mmol/L 105 103 108* 100   CO2 mmol/L 22.7 23.8 27.0 21.9*   BUN mg/dL 22 42* 30* 62*   CREATININE mg/dL 3.68* 5.51* 5.82* 7.81*   CALCIUM mg/dL 8.8 8.6 8.3* 8.1*   BILIRUBIN mg/dL  --  0.2 0.2 0.3   ALK PHOS U/L  --  75 55 53   ALT (SGPT) U/L  --  12 12 9   AST (SGOT) U/L  --  22 20 22   GLUCOSE mg/dL 87 148* 119* 85       Estimated Creatinine Clearance: 18.3 mL/min (A) (by C-G formula based on SCr of 3.68 mg/dL (H)).    Results from last 7 days   Lab Units 04/13/24  0714 04/12/24  0456 04/11/24  0640 04/10/24  0553   MAGNESIUM mg/dL  --  2.4 1.6 1.4*   PHOSPHORUS mg/dL 3.5 3.1 3.9 7.2*             Results from last 7 days   Lab Units 04/13/24  0714 04/12/24  0456 04/11/24  0640 04/10/24  0553 04/09/24  0653   WBC 10*3/mm3 5.88 5.72 5.53 6.47 8.08   HEMOGLOBIN g/dL 8.5* 7.8* 7.6* 7.6* 8.1*   PLATELETS 10*3/mm3 165 150 148 136* 144             Assessment / Plan     ASSESSMENT:  ESRD, previously on PD, but transitioned to HD due to  Candida peritonitis.  PD catheter removal, and TDC placement 4/12 on 4/9/2024.   Peritoneal dialysis-related peritonitis.  Culture showing Candida parapsilosis on Diflucan  Hypertension with CKD: BP not controlled, exacerbated by volume excess  Type 2 diabetes mellitus with CKD   Frequent hospitalizations for aspiration pneumonia  Bipolar disorder        PLAN:  HD tomorrow with additional fluid removal if still here   Await assignment of outpatient HD spot  Hopefully home soon    Thank you for involving us in the care of Nelsonirving Moncadael.  Please feel free to call with any questions.    Allan Mclean MD  04/13/24  11:38 EDT    [facility]  [phone]    Parts of this note may be an electronic transcription/translation of spoken language to printed text using the Dragon dictation  system.

## 2024-04-13 NOTE — PROGRESS NOTES
Name: Nelson Wang ADMIT: 2024   : 1945  PCP: Janna Mo MD    MRN: [id number] LOS: 4 days   AGE/SEX: 79 y.o. male  ROOM: [address]    Chief Complaint   Patient presents with    Fever    Hallucinations     CC: End-stage renal failure with infected PD catheter  Subjective     79 y.o. male end-stage renal failure with infected PD catheter and sepsis.  He is much better now with his mental status improving without infection.  Plans for hemodialysis mapping made.    Review of Systems mental status markedly improved    Objective     Scheduled Medications:   arformoterol, 15 mcg, Nebulization, BID - RT  ARIPiprazole, 2 mg, Oral, BID  atorvastatin, 40 mg, Oral, Daily  carvedilol, 6.25 mg, Oral, Nightly  fluconazole, 400 mg, Oral, Once per day on   insulin glargine, 10 Units, Subcutaneous, Nightly  insulin lispro, 2-7 Units, Subcutaneous, 4x Daily AC & at Bedtime  levothyroxine, 150 mcg, Oral, Q AM  pantoprazole, 40 mg, Oral, Q AM  saccharomyces boulardii, 250 mg, Oral, BID  sevelamer, 800 mg, Oral, TID With Meals        Active Infusions:       As Needed Medications:    acetaminophen    senna-docusate sodium **AND** polyethylene glycol **AND** bisacodyl **AND** bisacodyl    dextrose    dextrose    glucagon (human recombinant)    heparin (porcine)    HYDROcodone-acetaminophen    ipratropium-albuterol    LORazepam    melatonin    nitroglycerin    ondansetron ODT **OR** ondansetron    sodium chloride    sodium chloride    traMADol    Vital Signs  Vital Signs Patient Vitals for the past 24 hrs:   BP Temp Temp src Pulse Resp SpO2   24 1412 134/73 98.3 °F (36.8 °C) Oral 87 13 100 %   24 0702 -- -- -- 80 18 100 %   24 0657 -- -- -- 79 18 98 %   24 168/80 98.7 °F (37.1 °C) Oral -- 18 --   24 1714 158/82 97.5 °F (36.4 °C) Temporal 86 18 --   24 1713 155/74 97.3 °F (36.3 °C) Temporal 72 18 --     Vital Signs (range)  Temp:  [97.3 °F  (36.3 °C)-98.7 °F (37.1 °C)] 98.3 °F (36.8 °C)  Heart Rate:  [72-87] 87  Resp:  [13-18] 13  BP: (134-168)/(73-82) 134/73  I/O:  I/O last 3 completed shifts:  In: -   Out: 2600   I/O:   Intake/Output Summary (Last 24 hours) at 4/13/2024 1559  Last data filed at 4/13/2024 1452  Gross per 24 hour   Intake 240 ml   Output 2600 ml   Net -2360 ml     BMI:  Body mass index is 26.93 kg/m².    Physical Exam:  Physical Exam   , Dialysis catheter site clear.  Arm stable.  Abdomen benign.  Results Review:     CBC    Results from last 7 days   Lab Units 04/13/24  0714 04/12/24 0456 04/11/24  0640 04/10/24  0553 04/09/24  0653 04/08/24  2140   WBC 10*3/mm3 5.88 5.72 5.53 6.47 8.08 9.96   HEMOGLOBIN g/dL 8.5* 7.8* 7.6* 7.6* 8.1* 8.6*   PLATELETS 10*3/mm3 165 150 148 136* 144 128*     BMP   Results from last 7 days   Lab Units 04/13/24  0714 04/12/24 0456 04/11/24  0640 04/10/24  0553 04/09/24  0653 04/08/24  2140   SODIUM mmol/L 139 138 144 137 135* 134*   POTASSIUM mmol/L 4.5 4.6 4.5 4.1 3.9 4.2   CHLORIDE mmol/L 105 103 108* 100 100 95*   CO2 mmol/L 22.7 23.8 27.0 21.9* 22.0 24.3   BUN mg/dL 22 42* 30* 62* 42* 46*   CREATININE mg/dL 3.68* 5.51* 5.82* 7.81* 6.66* 6.31*   GLUCOSE mg/dL 87 148* 119* 85 81 122*   MAGNESIUM mg/dL  --  2.4 1.6 1.4*  --   --    PHOSPHORUS mg/dL 3.5 3.1 3.9 7.2* 4.1  --      Cr Clearance Estimated Creatinine Clearance: 18.3 mL/min (A) (by C-G formula based on SCr of 3.68 mg/dL (H)).  Coag     HbA1C   Lab Results   Component Value Date    HGBA1C 6.60 (H) 04/10/2024    HGBA1C 6.2 (H) 04/05/2024    HGBA1C 6.70 (H) 03/22/2024     Blood Glucose   Glucose   Date/Time Value Ref Range Status   04/13/2024 1531 251 (H) 70 - 130 mg/dL Final   04/13/2024 1104 118 70 - 130 mg/dL Final   04/13/2024 0728 79 70 - 130 mg/dL Final   04/12/2024 2049 159 (H) 70 - 130 mg/dL Final   04/12/2024 1148 110 70 - 130 mg/dL Final   04/12/2024 1026 110 70 - 130 mg/dL Final   04/12/2024 0603 140 (H) 70 - 130 mg/dL Final    04/11/2024 2023 108 70 - 130 mg/dL Final     Infection   Results from last 7 days   Lab Units 04/09/24  1357 04/08/24  2206 04/08/24  2140   BLOODCX   --  No growth at 4 days  No growth at 4 days  --    BODYFLDCX  Heavy growth (4+) Candida parapsilosis*  --   --    PROCALCITONIN ng/mL  --   --  1.25*     CMP   Results from last 7 days   Lab Units 04/13/24  0714 04/12/24  0456 04/11/24  0640 04/10/24  0553 04/09/24  0653 04/08/24  2140   SODIUM mmol/L 139 138 144 137 135* 134*   POTASSIUM mmol/L 4.5 4.6 4.5 4.1 3.9 4.2   CHLORIDE mmol/L 105 103 108* 100 100 95*   CO2 mmol/L 22.7 23.8 27.0 21.9* 22.0 24.3   BUN mg/dL 22 42* 30* 62* 42* 46*   CREATININE mg/dL 3.68* 5.51* 5.82* 7.81* 6.66* 6.31*   GLUCOSE mg/dL 87 148* 119* 85 81 122*   ALBUMIN g/dL 2.7* 2.6* 2.7* 2.3* 2.4* 2.8*   BILIRUBIN mg/dL  --  0.2 0.2 0.3  --  0.4   ALK PHOS U/L  --  75 55 53  --  55   AST (SGOT) U/L  --  22 20 22  --  19   ALT (SGPT) U/L  --  12 12 9  --  10     Radiology(recent) XR Chest Post CVA Port    Result Date: 4/12/2024   As described.    This report was finalized on 4/12/2024 12:12 PM by Dr. Omero Sanders M.D on Workstation: [id number]       Assessment & Plan     Assessment & Plan      Essential hypertension    Anemia due to chronic kidney disease    Hypothyroidism    Psoriasis    Monoclonal gammopathy of unknown significance (MGUS)    End stage renal disease    Sepsis    Type 2 diabetes mellitus, with long-term current use of insulin    GERD (gastroesophageal reflux disease)    Immunosuppression due to drug therapy    Bipolar disorder    Chronic respiratory failure with hypoxia    ESRD on peritoneal dialysis      79 y.o. male status post right tunneled catheter after removal of infected peritoneal dialysis catheter.  Patient has no current access in his arms but is agreeable to mapping of the arms to see if any veins exist.  We can preserve them or even pursue a fistula but makes sense as long-term he will likely need something  as a backup to what he plans to be recurrent PD catheter placement.  The moment to live with his current neck wound line.  At this point in time we will see what his mapping shows which I have ordered.      Enrique Vinson MD  04/13/24  15:57 EDT    Please call my office with any question: [phone]    Active Hospital Problems    Diagnosis  POA    Sepsis [A41.9]  Yes    Type 2 diabetes mellitus, with long-term current use of insulin [E11.9, Z79.4]  Not Applicable    GERD (gastroesophageal reflux disease) [K21.9]  Yes    Immunosuppression due to drug therapy [D84.821, Z79.899]  Not Applicable    Bipolar disorder [F31.9]  Yes    Chronic respiratory failure with hypoxia [J96.11]  Yes    ESRD on peritoneal dialysis [N18.6, Z99.2]  Not Applicable    End stage renal disease [N18.6]  Yes    Essential hypertension [I10]  Yes    Anemia due to chronic kidney disease [N18.9, D63.1]  Yes    Psoriasis [L40.9]  Yes    Monoclonal gammopathy of unknown significance (MGUS) [D47.2]  Yes    Hypothyroidism [E03.9]  Yes      Resolved Hospital Problems    Diagnosis Date Resolved POA    **Spontaneous bacterial peritonitis [K65.2] 04/09/2024 Yes    Peritoneal dialysis catheter in place [Z99.2] 04/09/2024 Not Applicable

## 2024-04-13 NOTE — CASE MANAGEMENT/SOCIAL WORK
Continued Stay Note  [facility]     Patient Name: Nelson Wang  MRN: [id number]  Today's Date: 4/13/2024    Admit Date: 4/8/2024    Plan: Home with [facility] to follow, new HD to start on Tuesday   Discharge Plan       Row Name 04/13/24 1421       Plan    Plan Home with [facility] to follow, new HD to start on Tuesday                   Discharge Codes    No documentation.                       Cecilia Monsivais RN

## 2024-04-13 NOTE — PLAN OF CARE
Goal Outcome Evaluation:               Pt is continuing to progress in treatment. He is able to ambulate without struggle. PT is over all pleasant and has no complaints.

## 2024-04-13 NOTE — PLAN OF CARE
Goal Outcome Evaluation:              Outcome Evaluation: Patient a/o 2-3 confused to location and situation. Pt doesn't remember getting the tunnel cath or receiving dialysis. Pt refusing to keep o2 sensor on and heart monitor. Pt has safety sitter at bedside. pt is impulsive and attempts to get oob without using call light and is unsteady on his feet. All meds given as ordered. PRN ativan given for agitatin and PRN melatonin for pain. Pt denies any pain. No new issues noted. This RN wore appropriate PPE during care. See v/s and labs.

## 2024-04-13 NOTE — PROGRESS NOTES
" LOS: 4 days     Name: Nelson Wang  Age: 79 y.o.  Sex: male  :  1945  MRN: [id number]         Primary Care Physician: Janna Mo MD    Subjective   Subjective  Confused last night but was able to rest.  No specific complaints this morning.  No acute events noted from nursing staff.    Objective   Vital Signs  Temp:  [97.3 °F (36.3 °C)-98.7 °F (37.1 °C)] 98.7 °F (37.1 °C)  Heart Rate:  [71-86] 80  Resp:  [16-18] 18  BP: (119-168)/() 168/80  Body mass index is 26.93 kg/m².    Objective:  General Appearance:  Comfortable and in no acute distress.    Vital signs: (most recent): Blood pressure 168/80, pulse 80, temperature 98.7 °F (37.1 °C), temperature source Oral, resp. rate 18, height 170.2 cm (67\"), weight 78 kg (171 lb 15.3 oz), SpO2 100%.    Lungs:  Normal effort and normal respiratory rate.  He is not in respiratory distress.  There are decreased breath sounds.    Heart: Normal rate.  Regular rhythm.    Chest: (Tunneled right-sided dialysis catheter in place)  Abdomen: Abdomen is soft.  Bowel sounds are normal.   There is no abdominal tenderness.     Extremities: There is no dependent edema or local swelling.    Neurological: Patient is alert.  (Seems a bit confused to place and situation).    Skin:  Warm and dry.                Results Review:       I reviewed the patient's new clinical results.    Results from last 7 days   Lab Units 24  0714 246 24  0640 04/10/24  0553 24  0653 24  2140   WBC 10*3/mm3 5.88 5.72 5.53 6.47 8.08 9.96   HEMOGLOBIN g/dL 8.5* 7.8* 7.6* 7.6* 8.1* 8.6*   PLATELETS 10*3/mm3 165 150 148 136* 144 128*     Results from last 7 days   Lab Units 24  0714 24  0456 24  0640 04/10/24  0553 24  0653 24  2140   SODIUM mmol/L 139 138 144 137 135* 134*   POTASSIUM mmol/L 4.5 4.6 4.5 4.1 3.9 4.2   CHLORIDE mmol/L 105 103 108* 100 100 95*   CO2 mmol/L 22.7 23.8 27.0 21.9* 22.0 24.3   BUN mg/dL 22 42* 30* 62* 42* " 46*   CREATININE mg/dL 3.68* 5.51* 5.82* 7.81* 6.66* 6.31*   CALCIUM mg/dL 8.8 8.6 8.3* 8.1* 7.4* 8.3*   GLUCOSE mg/dL 87 148* 119* 85 81 122*                 Scheduled Meds:   arformoterol, 15 mcg, Nebulization, BID - RT  ARIPiprazole, 2 mg, Oral, BID  atorvastatin, 40 mg, Oral, Daily  carvedilol, 6.25 mg, Oral, Nightly  fluconazole, 400 mg, Oral, Once per day on Monday Wednesday Friday  insulin glargine, 10 Units, Subcutaneous, Nightly  insulin lispro, 2-7 Units, Subcutaneous, 4x Daily AC & at Bedtime  levothyroxine, 150 mcg, Oral, Q AM  pantoprazole, 40 mg, Oral, Q AM  saccharomyces boulardii, 250 mg, Oral, BID  sevelamer, 800 mg, Oral, TID With Meals      PRN Meds:     acetaminophen    senna-docusate sodium **AND** polyethylene glycol **AND** bisacodyl **AND** bisacodyl    dextrose    dextrose    glucagon (human recombinant)    heparin (porcine)    HYDROcodone-acetaminophen    ipratropium-albuterol    LORazepam    melatonin    nitroglycerin    ondansetron ODT **OR** ondansetron    sodium chloride    sodium chloride    traMADol  Continuous Infusions:       Assessment & Plan   Active Hospital Problems    Diagnosis  POA    Sepsis [A41.9]  Yes    Type 2 diabetes mellitus, with long-term current use of insulin [E11.9, Z79.4]  Not Applicable    GERD (gastroesophageal reflux disease) [K21.9]  Yes    Immunosuppression due to drug therapy [D84.821, Z79.899]  Not Applicable    Bipolar disorder [F31.9]  Yes    Chronic respiratory failure with hypoxia [J96.11]  Yes    ESRD on peritoneal dialysis [N18.6, Z99.2]  Not Applicable    End stage renal disease [N18.6]  Yes    Essential hypertension [I10]  Yes    Anemia due to chronic kidney disease [N18.9, D63.1]  Yes    Psoriasis [L40.9]  Yes    Monoclonal gammopathy of unknown significance (MGUS) [D47.2]  Yes    Hypothyroidism [E03.9]  Yes      Resolved Hospital Problems    Diagnosis Date Resolved POA    **Spontaneous bacterial peritonitis [K65.2] 04/09/2024 Yes    Peritoneal  dialysis catheter in place [Z99.2] 04/09/2024 Not Applicable       Assessment & Plan    78yo gentleman with sepsis/fever/hallucinations due to candidal SBP due to PD catheter.     Sepsis  PD catheter-related Candidal SBP  Has been transition to oral fluconazole with plans to continue through 5/1/2024.  Outside cultures growing Candida parapsilosis  PD cath fluid cultures here growing same  Blood cultures NGTD  PD catheter out 4/9     Immunosuppressed (Skyrizi for psoriasis)  H/o CDiff colitis  Minimize abx exposure  Holding Skyrizi and will continue to hold as an outpatient     ESRD  PD cath out 4/9  HD per Renal  Now has tunneled dialysis catheter in place     Hypomagnesemia  Monitor and replace as needed     Anemia of CKD  Hgb stable  Monitor     DM2  Blood sugars reasonably controlled  Continue nightly Lantus at attenuated dose  Covering with SSI  A1c 6.6     HypoT4  Continue L-T4  TSH quite high but free T4 1.16, no change to current dose, repeat TFTs when not acutely ill     Recent hospitalizations for aspiration PNA  Chronic hypoxic resp failure  Continue supplemental O2 as needed (on RA presently)  Incentive spirometer  Continue Brovana and PRN DuoNebs     HTN  BPs acceptable at present   Continue Coreg with holding parameters     Bipolar D/O/encephalopathy  Continue Abilify, no amirah today  Gets confused in the evenings.  Daughter reports that he has not tolerated the atypical antipsychotics well in the past.  Continue low-dose as needed Ativan at bedtime  His encephalopathy will improve once he is back out of the hospital        SCDs for DVT prophylaxis.  Full code.  Discussed with patient  Anticipate discharge to be determined.  Awaiting establishment of outpatient dialysis      Expected discharge date/ time has not been documented.     Cristian Santacruz MD  [facility]  04/13/24  10:06 EDT

## 2024-04-14 ENCOUNTER — READMISSION MANAGEMENT (OUTPATIENT)
Dept: CALL CENTER | Facility: HOSPITAL | Age: 79
End: 2024-04-14
Payer: MEDICARE

## 2024-04-14 VITALS
HEIGHT: 67 IN | SYSTOLIC BLOOD PRESSURE: 174 MMHG | WEIGHT: 158.07 LBS | HEART RATE: 86 BPM | DIASTOLIC BLOOD PRESSURE: 93 MMHG | RESPIRATION RATE: 16 BRPM | TEMPERATURE: 98 F | OXYGEN SATURATION: 98 % | BODY MASS INDEX: 24.81 KG/M2

## 2024-04-14 PROBLEM — T85.71XA PERITONITIS ASSOCIATED WITH PERITONEAL DIALYSIS: Status: ACTIVE | Noted: 2024-02-16

## 2024-04-14 LAB
ALBUMIN SERPL-MCNC: 3.1 G/DL (ref 3.5–5.2)
ANION GAP SERPL CALCULATED.3IONS-SCNC: 9.3 MMOL/L (ref 5–15)
BUN SERPL-MCNC: 30 MG/DL (ref 8–23)
BUN/CREAT SERPL: 6.3 (ref 7–25)
CALCIUM SPEC-SCNC: 9.2 MG/DL (ref 8.6–10.5)
CHLORIDE SERPL-SCNC: 103 MMOL/L (ref 98–107)
CO2 SERPL-SCNC: 23.7 MMOL/L (ref 22–29)
CREAT SERPL-MCNC: 4.73 MG/DL (ref 0.76–1.27)
DEPRECATED RDW RBC AUTO: 48.4 FL (ref 37–54)
EGFRCR SERPLBLD CKD-EPI 2021: 11.9 ML/MIN/1.73
ERYTHROCYTE [DISTWIDTH] IN BLOOD BY AUTOMATED COUNT: 14.3 % (ref 12.3–15.4)
GLUCOSE BLDC GLUCOMTR-MCNC: 131 MG/DL (ref 70–130)
GLUCOSE BLDC GLUCOMTR-MCNC: 151 MG/DL (ref 70–130)
GLUCOSE SERPL-MCNC: 103 MG/DL (ref 65–99)
HCT VFR BLD AUTO: 28.3 % (ref 37.5–51)
HGB BLD-MCNC: 8.8 G/DL (ref 13–17.7)
MCH RBC QN AUTO: 29.6 PG (ref 26.6–33)
MCHC RBC AUTO-ENTMCNC: 31.1 G/DL (ref 31.5–35.7)
MCV RBC AUTO: 95.3 FL (ref 79–97)
PHOSPHATE SERPL-MCNC: 3.1 MG/DL (ref 2.5–4.5)
PLATELET # BLD AUTO: 179 10*3/MM3 (ref 140–450)
PMV BLD AUTO: 9.6 FL (ref 6–12)
POTASSIUM SERPL-SCNC: 4.4 MMOL/L (ref 3.5–5.2)
RBC # BLD AUTO: 2.97 10*6/MM3 (ref 4.14–5.8)
SODIUM SERPL-SCNC: 136 MMOL/L (ref 136–145)
WBC NRBC COR # BLD AUTO: 7.3 10*3/MM3 (ref 3.4–10.8)

## 2024-04-14 PROCEDURE — 80069 RENAL FUNCTION PANEL: CPT | Performed by: INTERNAL MEDICINE

## 2024-04-14 PROCEDURE — 99024 POSTOP FOLLOW-UP VISIT: CPT | Performed by: SURGERY

## 2024-04-14 PROCEDURE — 94799 UNLISTED PULMONARY SVC/PX: CPT

## 2024-04-14 PROCEDURE — 85027 COMPLETE CBC AUTOMATED: CPT | Performed by: INTERNAL MEDICINE

## 2024-04-14 PROCEDURE — 97530 THERAPEUTIC ACTIVITIES: CPT

## 2024-04-14 PROCEDURE — 94664 DEMO&/EVAL PT USE INHALER: CPT

## 2024-04-14 PROCEDURE — 82948 REAGENT STRIP/BLOOD GLUCOSE: CPT

## 2024-04-14 PROCEDURE — 94760 N-INVAS EAR/PLS OXIMETRY 1: CPT

## 2024-04-14 PROCEDURE — 94761 N-INVAS EAR/PLS OXIMETRY MLT: CPT

## 2024-04-14 RX ORDER — SEVELAMER CARBONATE 800 MG/1
800 TABLET, FILM COATED ORAL
Qty: 90 TABLET | Refills: 0 | Status: SHIPPED | OUTPATIENT
Start: 2024-04-14 | End: 2024-05-14

## 2024-04-14 RX ORDER — SACCHAROMYCES BOULARDII 250 MG
250 CAPSULE ORAL 2 TIMES DAILY
Qty: 60 CAPSULE | Refills: 0 | Status: SHIPPED | OUTPATIENT
Start: 2024-04-14 | End: 2024-05-14

## 2024-04-14 RX ORDER — CARVEDILOL 6.25 MG/1
6.25 TABLET ORAL DAILY
Start: 2024-04-14 | End: 2024-04-24 | Stop reason: SDUPTHER

## 2024-04-14 RX ORDER — ARIPIPRAZOLE 2 MG/1
2 TABLET ORAL 2 TIMES DAILY
Start: 2024-04-14

## 2024-04-14 RX ORDER — FLUCONAZOLE 200 MG/1
200 TABLET ORAL 3 TIMES WEEKLY
Qty: 7 TABLET | Refills: 0 | Status: SHIPPED | OUTPATIENT
Start: 2024-04-15

## 2024-04-14 RX ADMIN — ARFORMOTEROL TARTRATE 15 MCG: 15 SOLUTION RESPIRATORY (INHALATION) at 07:30

## 2024-04-14 RX ADMIN — SEVELAMER CARBONATE 800 MG: 800 TABLET, FILM COATED ORAL at 18:10

## 2024-04-14 RX ADMIN — SEVELAMER CARBONATE 800 MG: 800 TABLET, FILM COATED ORAL at 13:33

## 2024-04-14 RX ADMIN — PANTOPRAZOLE SODIUM 40 MG: 40 TABLET, DELAYED RELEASE ORAL at 06:20

## 2024-04-14 RX ADMIN — SEVELAMER CARBONATE 800 MG: 800 TABLET, FILM COATED ORAL at 09:05

## 2024-04-14 RX ADMIN — ATORVASTATIN CALCIUM 40 MG: 20 TABLET, FILM COATED ORAL at 09:05

## 2024-04-14 RX ADMIN — ARIPIPRAZOLE 2 MG: 2 TABLET ORAL at 09:08

## 2024-04-14 RX ADMIN — Medication 250 MG: at 09:06

## 2024-04-14 RX ADMIN — LEVOTHYROXINE SODIUM 150 MCG: 150 TABLET ORAL at 06:20

## 2024-04-14 NOTE — THERAPY TREATMENT NOTE
Patient Name: Nelson Wang  : 1945    MRN: [id number]                              Today's Date: 2024       Admit Date: 2024    Visit Dx:     ICD-10-CM ICD-9-CM   1. Sepsis, due to unspecified organism, unspecified whether acute organ dysfunction present  A41.9 038.9     995.91   2. SBP (spontaneous bacterial peritonitis)  K65.2 567.23   3. Fungal infection  B49 117.9   4. ESRD on peritoneal dialysis  N18.6 585.6    Z99.2 V45.11   5. Spontaneous bacterial peritonitis  K65.2 567.23   6. End stage renal disease  N18.6 585.6     Patient Active Problem List   Diagnosis    Essential hypertension    Bradycardia, sinus    Anemia due to chronic kidney disease    Hypothyroidism    Idiopathic gout    Proteinuria    Psoriasis    Thrombocytopenia    Type 2 diabetes mellitus without complication    CKD (chronic kidney disease), stage IV    Hyperlipidemia    Monoclonal gammopathy of unknown significance (MGUS)    Stage 5 chronic kidney disease    Chronic gout without tophus    Peritoneal dialysis catheter dysfunction    Medicare annual wellness visit, subsequent    Chronic cough    Peritonitis associated with peritoneal dialysis    HCAP (healthcare-associated pneumonia)    Acute on chronic respiratory failure with hypoxia    End stage renal disease    Aspiration pneumonitis    Sepsis    Type 2 diabetes mellitus, with long-term current use of insulin    GERD (gastroesophageal reflux disease)    Immunosuppression due to drug therapy    Bipolar disorder    Chronic respiratory failure with hypoxia     Past Medical History:   Diagnosis Date    Anemia     Ankle pain, right     CKD (chronic kidney disease), stage IV     Diabetes     Gout     High cholesterol     HL (hearing loss)     Hyperkalemia     Hypertension     Hypothyroidism     Psoriasis     Vitiligo      Past Surgical History:   Procedure Laterality Date    ANKLE SURGERY  1990    APPENDECTOMY N/A     BRONCHOSCOPY N/A 3/1/2024    Procedure: BRONCHOSCOPY  WITH BAL AND WASHINGS;  Surgeon: Sandra Espinal MD;  Location: [facility] ENDOSCOPY;  Service: Pulmonary;  Laterality: N/A;  PNEUMONIA    COLONOSCOPY N/A 06/2022    [facility]    HIP BIPOLAR REPLACEMENT Right 01/2000    INSERTION HEMODIALYSIS CATHETER Left 4/9/2024    Procedure: HEMODIALYSIS CATHETER INSERTION;  Surgeon: Jose Berry MD;  Location: [facility] OR;  Service: General;  Laterality: Left;    INSERTION PERITONEAL DIALYSIS CATHETER N/A 3/27/2023    Procedure: LAPAROSCOPIC INSERTION PERITONEAL DIALYSIS CATHETER, LAPAROSCOPIC OMENTOPEXY WITH LYSIS OF ADHESIONS;  Surgeon: Jose Berry MD;  Location: [facility] OR;  Service: General;  Laterality: N/A;    INSERTION PERITONEAL DIALYSIS CATHETER Left 7/23/2023    Procedure: REVISION OF PERITONEAL DIALYSIS CATHETER;  Surgeon: Radha Oreilly MD;  Location: [facility] OR;  Service: General;  Laterality: Left;    REMOVAL PERITONEAL DIALYSIS CATHETER N/A 4/9/2024    Procedure: REMOVAL PERITONEAL DIALYSIS CATHETER;  Surgeon: Jose Berry MD;  Location: [facility] OR;  Service: General;  Laterality: N/A;    RENAL BIOPSY Left 07/15/2022      General Information       Row Name 04/14/24 1231          Physical Therapy Time and Intention    Document Type therapy note (daily note)  -EM     Mode of Treatment individual therapy;physical therapy  -EM       Row Name 04/14/24 1231          General Information    Existing Precautions/Restrictions --  pt up ad kentrell in room  -EM               User Key  (r) = Recorded By, (t) = Taken By, (c) = Cosigned By      Initials Name Provider Type    EM Deanna Plummer PT Physical Therapist                   Mobility       Row Name 04/14/24 1231          Bed Mobility    Bed Mobility sit-supine  -EM     Supine-Sit [place] (Bed Mobility) independent  -EM     Sit-Supine [place] (Bed Mobility) independent  -EM     Comment, (Bed Mobility) head of bed flat, no use of rails  -EM       Row  "Name 04/14/24 1231          Sit-Stand Transfer    Sit-Stand [place] (Transfers) independent  -EM       Row Name 04/14/24 1231          Gait/Stairs (Locomotion)    [place] Level (Gait) standby assist  -EM     Distance in Feet (Gait) 200  -EM     Comment, (Gait/Stairs) no LOB, pt states he \"tends to go too fast\", recognizes when he is going too fast and slows his pace  -EM               User Key  (r) = Recorded By, (t) = Taken By, (c) = Cosigned By      Initials Name Provider Type    Deanna Lehman, PT Physical Therapist                   Obj/Interventions    No documentation.                  Goals/Plan    No documentation.                  Clinical Impression       Row Name 04/14/24 1233          Pain    Pretreatment Pain Rating 0/10 - no pain  -EM       Row Name 04/14/24 1233          Plan of Care Review    Plan of Care Reviewed With patient  -EM     Outcome Evaluation Pt awake and alert, up ad kentrell in room, states he is feeling much better and hoping to go home soon. patient performed bed mobility independently with bed flat and no use of bed rails, sit to stand independently, ambulated about 200 feet with SBA, no overt LOB, slight unsteadiness when pace too fast but pt able to self monitor pace and slow down. Pt demonstrates improvement in mobility, anticipate d/c home soon.  -EM       Row Name 04/14/24 1233          Positioning and Restraints    Pre-Treatment Position in bed  -EM     Post Treatment Position bed  -EM     In Bed supine;call light within reach  -EM               User Key  (r) = Recorded By, (t) = Taken By, (c) = Cosigned By      Initials Name Provider Type    Deanna Lehman PT Physical Therapist                   Outcome Measures       Row Name 04/14/24 1235          How much help from another person do you currently need...    Turning from your back to your side while in flat bed without using bedrails? 4  -EM     Moving from lying on back to sitting on the side of a " flat bed without bedrails? 4  -EM     Moving to and from a bed to a chair (including a wheelchair)? 4  -EM     Standing up from a chair using your arms (e.g., wheelchair, bedside chair)? 4  -EM     Climbing 3-5 steps with a railing? 3  -EM     To walk in hospital room? 3  -EM     AM-PAC 6 Clicks Score (PT) 22  -EM     Highest Level of Mobility Goal 7 --> Walk 25 feet or more  -EM               User Key  (r) = Recorded By, (t) = Taken By, (c) = Cosigned By      Initials Name Provider Type    EM Deanna Plummer, PT Physical Therapist                                 Physical Therapy Education       Title: PT OT SLP Therapies (Done)       Topic: Physical Therapy (Done)       Point: Mobility training (Done)       Learning Progress Summary             Patient Acceptance, E, VU,DU by  at 4/14/2024 1235    Acceptance, E, VU,NR by  at 4/11/2024 1741                         Point: Home exercise program (Done)       Learning Progress Summary             Patient Acceptance, E, VU,NR by  at 4/11/2024 1741                                         User Key       Initials Effective Dates Name Provider Type Discipline    EM 06/16/21 -  Deanna Plummer, PT Physical Therapist PT     07/11/23 -  Radha Kennedy, PT Physical Therapist PT                  PT Recommendation and Plan     Plan of Care Reviewed With: patient  Outcome Evaluation: Pt awake and alert, up ad kentrell in room, states he is feeling much better and hoping to go home soon. patient performed bed mobility independently with bed flat and no use of bed rails, sit to stand independently, ambulated about 200 feet with SBA, no overt LOB, slight unsteadiness when pace too fast but pt able to self monitor pace and slow down. Pt demonstrates improvement in mobility, anticipate d/c home soon.     Time Calculation:         PT Charges       Row Name 04/14/24 1236             Time Calculation    Start Time 1027  -EM      Stop Time 1036  -EM      Time Calculation  (min) 9 min  -EM      PT Received On 04/14/24  -EM      PT - Next Appointment 04/15/24  -EM         Time Calculation- PT    Total Timed Code Minutes- PT 9 minute(s)  -EM         Timed Charges    20265 - PT Therapeutic Activity Minutes 9  -EM         Total Minutes    Timed Charges Total Minutes 9  -EM       Total Minutes 9  -EM                User Key  (r) = Recorded By, (t) = Taken By, (c) = Cosigned By      Initials Name Provider Type    EM Deanna Plummer PT Physical Therapist                  Therapy Charges for Today       Code Description Service Date Service Provider Modifiers Qty    [id number]  PT THERAPEUTIC ACT EA 15 MIN 4/14/2024 Deanna Plummer PT GP 1            PT G-Codes  AM-PAC 6 Clicks Score (PT): 22       Deanna Plummer PT  4/14/2024

## 2024-04-14 NOTE — NURSING NOTE
HD completed.  Unable to tolerate 3L removed due to cramps.  CVC dsg CDI.  CVC locked, clamped, capped.  2L removed.  Report given to primary nurse        Nakia Maher  [facility]

## 2024-04-14 NOTE — PROGRESS NOTES
[facility] Progress Note      Patient Name: Nelson Wang  : 1945  MRN: [id number]  Primary Care Physician:  Janna Mo MD  Date of admission: 2024    Subjective     Interval History:   Follow-up ESRD   Recently switched from PD to hemodialysis given fungal peritonitis  First hemodialysis on 4/10; TDC placed     Sitting on side of bed, eating lunch  Breathing is comfortable on room air  Eager for discharge after HD completed this afternoon      Review of Systems:   As noted above    Objective     Vitals:   Temp:  [98.3 °F (36.8 °C)-98.7 °F (37.1 °C)] 98.7 °F (37.1 °C)  Heart Rate:  [73-91] 73  Resp:  [13-18] 17  BP: (122-173)/(62-85) 122/62    Intake/Output Summary (Last 24 hours) at 2024 1216  Last data filed at 2024 0620  Gross per 24 hour   Intake 1524 ml   Output --   Net 1524 ml       Physical Exam:    General Appearance: Awake, appropriate  Skin: warm and dry; extensive vitiligo  HEENT: oral mucosa normal, nonicteric sclera  Neck: supple, no JVD  Lungs: Diffuse crackles; not labored on room air  Heart: RRR, normal S1 and S2  Abdomen: Distended, not tender, bandages C/D/I; PDC removed  : no palpable bladder  Extremities: no edema, cyanosis or clubbing  Neuro: normal speech  Vascular: Right IJ TDC       Scheduled Meds:     arformoterol, 15 mcg, Nebulization, BID - RT  ARIPiprazole, 2 mg, Oral, BID  atorvastatin, 40 mg, Oral, Daily  carvedilol, 6.25 mg, Oral, Nightly  fluconazole, 400 mg, Oral, Once per day on   insulin glargine, 10 Units, Subcutaneous, Nightly  insulin lispro, 2-7 Units, Subcutaneous, 4x Daily AC & at Bedtime  levothyroxine, 150 mcg, Oral, Q AM  pantoprazole, 40 mg, Oral, Q AM  saccharomyces boulardii, 250 mg, Oral, BID  sevelamer, 800 mg, Oral, TID With Meals      IV Meds:        Results Reviewed:   I have personally reviewed the results from the time of this admission to 2024 12:16 EDT     Results  from last 7 days   Lab Units 04/14/24  0759 04/13/24  0714 04/12/24  0456 04/11/24  0640 04/10/24  0553   SODIUM mmol/L 136 139 138 144 137   POTASSIUM mmol/L 4.4 4.5 4.6 4.5 4.1   CHLORIDE mmol/L 103 105 103 108* 100   CO2 mmol/L 23.7 22.7 23.8 27.0 21.9*   BUN mg/dL 30* 22 42* 30* 62*   CREATININE mg/dL 4.73* 3.68* 5.51* 5.82* 7.81*   CALCIUM mg/dL 9.2 8.8 8.6 8.3* 8.1*   BILIRUBIN mg/dL  --   --  0.2 0.2 0.3   ALK PHOS U/L  --   --  75 55 53   ALT (SGPT) U/L  --   --  12 12 9   AST (SGOT) U/L  --   --  22 20 22   GLUCOSE mg/dL 103* 87 148* 119* 85       Estimated Creatinine Clearance: 14.2 mL/min (A) (by C-G formula based on SCr of 4.73 mg/dL (H)).    Results from last 7 days   Lab Units 04/14/24  0759 04/13/24  0714 04/12/24  0456 04/11/24  0640 04/10/24  0553   MAGNESIUM mg/dL  --   --  2.4 1.6 1.4*   PHOSPHORUS mg/dL 3.1 3.5 3.1 3.9 7.2*             Results from last 7 days   Lab Units 04/14/24  0759 04/13/24  0714 04/12/24 0456 04/11/24  0640 04/10/24  0553   WBC 10*3/mm3 7.30 5.88 5.72 5.53 6.47   HEMOGLOBIN g/dL 8.8* 8.5* 7.8* 7.6* 7.6*   PLATELETS 10*3/mm3 179 165 150 148 136*             Assessment / Plan     ASSESSMENT:  ESRD, previously on PD, but transitioned to HD due to Candida peritonitis.  PD catheter removed.TDC placement 4/12 on 4/9/2024.  Volume excess by imaging and exam (crackles).  Electrolytes fine  Peritoneal dialysis-related peritonitis.  Culture showing Candida parapsilosis on Diflucan  Hypertension with CKD: BP improving with fluid removal  Type 2 diabetes mellitus with CKD   Frequent hospitalizations for aspiration pneumonia  Bipolar disorder        PLAN:  HD today with additional fluid removal   I called HD orders yesterday to the HD nurse at [facility]  Home after HD today    Thank you for involving us in the care of Nelson Wang.  Please feel free to call with any questions.    Allan Mclean MD  04/14/24  12:16 EDT    [facility]  [phone]    Parts of this note may be an electronic transcription/translation of spoken language to printed text using the Dragon dictation system.

## 2024-04-14 NOTE — DISCHARGE SUMMARY
Date of Admission: 4/8/2024  Date of Discharge:  4/14/2024  Primary Care Physician: Janna Mo MD     Discharge Diagnosis:  Active Hospital Problems    Diagnosis  POA    **Peritonitis associated with peritoneal dialysis [T85.71XA]  Yes    Sepsis [A41.9]  Yes    Type 2 diabetes mellitus, with long-term current use of insulin [E11.9, Z79.4]  Not Applicable    GERD (gastroesophageal reflux disease) [K21.9]  Yes    Immunosuppression due to drug therapy [D84.821, Z79.899]  Not Applicable    Bipolar disorder [F31.9]  Yes    Chronic respiratory failure with hypoxia [J96.11]  Yes    End stage renal disease [N18.6]  Yes    Essential hypertension [I10]  Yes    Anemia due to chronic kidney disease [N18.9, D63.1]  Yes    Psoriasis [L40.9]  Yes    Monoclonal gammopathy of unknown significance (MGUS) [D47.2]  Yes    Hypothyroidism [E03.9]  Yes      Resolved Hospital Problems    Diagnosis Date Resolved POA    Spontaneous bacterial peritonitis [K65.2] 04/09/2024 Yes    Peritoneal dialysis catheter in place [Z99.2] 04/09/2024 Not Applicable       DETAILS OF HOSPITAL STAY     Pertinent Test Results and Procedures Performed    Chest x-ray:  No interval change or evidence for active disease in the   chest.     Head CT:  No evidence of acute intracranial pathology.     CT scan of the abdomen and pelvis:    Diffuse mesenteric edema and fairly extensive   mesenteric portomesenteric gas.  There are multiple regions with   significant extraluminal gas associated with multiple small bowel loops,   most prominent in the right lower quadrant.  Findings are compatible with   significant mesenteric and bowel ischemia and bowel wall disruption.    Surgical consultation recommended.       Hospital Course  This is a very pleasant 79-year-old gentleman with history of end-stage renal disease for which she has been receiving peritoneal dialysis.  He presented with sepsis/fever/hallucinations due to candidal peritonitis secondary to his PD  catheter.  Please see H&P for full details.  He was evaluated by infectious disease who placed him on micafungin.  Repeat peritoneal fluid culture here was consistent with Candida para papulosis sensitive to micafungin as well as fluconazole.  General surgery was consulted and his PD catheter was removed.  Vascular surgery was consulted and initially had a nontunneled right IJ dialysis catheter that was laced with a tunneled dialysis catheter towards the end of his hospitalization.  He has initiated hemodialysis and has tolerated this without difficulty.  He has had marked improvement of his mentation.  He is no longer confused or hallucinating.  He is very pleasant today sitting up in the chair watching [employer] on TV.  He now has establishment of outpatient hemodialysis starting on Tuesday.  He will receive dialysis today here in the hospital prior to discharge.  Infectious disease has transitioned him to fluconazole to be taken 3 times weekly after he receives dialysis on Tuesdays, Thursdays, Saturdays.  This will run through 5/1/2024.  Vascular surgery is in the process of evaluating him for AV fistula and this can continue in the outpatient setting.  He is now overall improved, medically stable, cleared by consultants for discharge and will be released home today.    Physical Exam at Discharge:  General: No acute distress, AAOx3  HEENT: EOMI, PERRL  Cardiovascular: +s1 and s2, RRR  Lungs: No rhonchi or wheezing  Abdomen: soft, nontender    Consults:   Consults       Date and Time Order Name Status Description    4/9/2024  7:42 AM Inpatient Infectious Diseases Consult Completed     4/9/2024  1:55 AM Inpatient General Surgery Consult Completed     4/9/2024  1:55 AM Inpatient Nephrology Consult Completed     4/9/2024 12:20 AM Surgery (on-call MD unless specified) Completed     4/8/2024 11:45 PM [facility] (on-call MD unless specified) Details      4/8/2024 11:18 PM Nephrology (on -call MD unless specified)                 Condition on Discharge: Stable, improved    Discharge Disposition  Home or Self Care    Discharge Medications     Discharge Medications        New Medications        Instructions Start Date   fluconazole 200 MG tablet  Commonly known as: DIFLUCAN   Take 1 tablet by mouth 3 (Three) Times a Week. Take AFTER dialysis on Tuesday, Thursday, and Saturdays. Indications: Infection Within the Abdomen   Start Date: April 15, 2024            Changes to Medications        Instructions Start Date   Insulin Glargine 100 UNIT/ML injection pen  Commonly known as: LANTUS SOLOSTAR  What changed: how much to take   10 Units, Subcutaneous, Nightly      sevelamer 800 MG tablet  Commonly known as: RENVELA  What changed: when to take this   800 mg, Oral, 3 Times Daily With Meals             Continue These Medications        Instructions Start Date   arformoterol 15 MCG/2ML nebulizer solution  Commonly known as: BROVANA   15 mcg, Nebulization, 2 Times Daily - RT      ARIPiprazole 2 MG tablet  Commonly known as: ABILIFY   2 mg, Oral, 2 Times Daily      atorvastatin 40 MG tablet  Commonly known as: LIPITOR   40 mg, Oral, Daily      Beclomethasone Diprop HFA 40 MCG/ACT inhaler  Commonly known as: QVAR Redihaler   2 puffs, Inhalation, 2 Times Daily - RT      carvedilol 6.25 MG tablet  Commonly known as: COREG   6.25 mg, Oral, Daily      ipratropium-albuterol 0.5-2.5 mg/3 ml nebulizer  Commonly known as: DUO-NEB   3 mL, Nebulization, Every 4 Hours PRN      levothyroxine 150 MCG tablet  Commonly known as: SYNTHROID, LEVOTHROID   150 mcg, Oral, Every Early Morning      pantoprazole 40 MG EC tablet  Commonly known as: PROTONIX   40 mg, Oral, Every Early Morning      saccharomyces boulardii 250 MG capsule  Commonly known as: FLORASTOR   250 mg, Oral, 2 Times Daily             Stop These Medications      cyclobenzaprine 5 MG tablet  Commonly known as: FLEXERIL     gentamicin 0.1 % cream  Commonly known as: GARAMYCIN     SKYRIZI (150 MG DOSE)  SC     torsemide 100 MG tablet  Commonly known as: DEMADEX              Discharge Diet:   Diet Instructions       Diet: Regular/House Diet, Cardiac Diets, Diabetic Diets, Renal Diets; Healthy Heart (2-3 Na+); Thin (IDDSI 0); Consistent Carbohydrate; Low Sodium (2-3g), Low Potassium, Low Phosphorus      Discharge Diet:  Regular/House Diet  Cardiac Diets  Diabetic Diets  Renal Diets       Cardiac Diet: Healthy Heart (2-3 Na+)    Fluid Consistency: Thin (IDDSI 0)    Diabetic Diet: Consistent Carbohydrate    Renal Diet:  Low Sodium (2-3g)  Low Potassium  Low Phosphorus               Activity at Discharge:   Activity Instructions       Activity as Tolerated              Follow-up Appointments  Future Appointments   Date Time Provider Department Center   4/22/2024 10:15 AM [facility] RUSTY CT 1  AMY ETWCT [facility]   4/22/2024 11:00 AM Janna Mo MD [facility] IMP RADC [facility]   5/14/2024 11:15 AM Aylin Nicole APRN [facility] CD EDIXE [facility]   6/5/2024  9:45 AM Sandra Espinal MD [facility] PCC ETW [facility]   6/10/2024  1:45 PM Domingo Bravo MD [facility] IMP ETWN [facility]   8/30/2024 10:15 AM Flor Kamara APRMASOUD [facility] GE ETWR AMY     Additional Instructions for the Follow-ups that You Need to Schedule       Discharge Follow-up with PCP   As directed       Currently Documented PCP:    Janna Mo MD    PCP Phone Number:    [phone]     Follow Up Details: 1 week        Discharge Follow-up with Specialty: Nephrology as per routine dialysis schedule   As directed      Specialty: Nephrology as per routine dialysis schedule        Discharge Follow-up with Specified Provider: Vascular surgery for additional planning of potential dialysis fistula   As directed      To: Vascular surgery for additional planning of potential dialysis fistula                Test Results Pending at Discharge  Pending Labs       Order Current Status    Fungus Culture - Body Fluid, Peritoneal Catheter In process            I have examined and discussed discharge planning  with the patient today.    I wore full protective equipment throughout the patient encounter including eye protection and facemask.  Hand hygiene was performed before donning protective equipment and after removal when leaving the room.     Cristian Santacruz MD  04/14/24  11:38 EDT    Time: Discharge greater than 30 min

## 2024-04-14 NOTE — PLAN OF CARE
Goal Outcome Evaluation:  Plan of Care Reviewed With: patient           Outcome Evaluation: Pt awake and alert, up ad kentrell in room, states he is feeling much better and hoping to go home soon. patient performed bed mobility independently with bed flat and no use of bed rails, sit to stand independently, ambulated about 200 feet with SBA, no overt LOB, slight unsteadiness when pace too fast but pt able to self monitor pace and slow down. Pt demonstrates improvement in mobility, anticipate d/c home soon.

## 2024-04-14 NOTE — PROGRESS NOTES
Name: Nelson Wang ADMIT: 2024   : 1945  PCP: Janna Mo MD    MRN: [id number] LOS: 5 days   AGE/SEX: 79 y.o. male  ROOM: [facility]/[address] [place] [address]    Chief Complaint   Patient presents with    Fever    Hallucinations     CC: End-stage renal failure with infected PD catheter  Subjective     79 y.o. male end-stage renal failure with infected PD catheter and sepsis.  He is much better now with his mental status improving without infection.  He has surprisingly good access in both arms and likely could have a fistula placed this admission.  Will discuss with Dr. Lane.  We will save his left arm as he is right arm dominant.    Review of Systems mental status markedly improved, understands the need for fistula even if he goes back to PD    Objective     Scheduled Medications:   arformoterol, 15 mcg, Nebulization, BID - RT  ARIPiprazole, 2 mg, Oral, BID  atorvastatin, 40 mg, Oral, Daily  carvedilol, 6.25 mg, Oral, Nightly  fluconazole, 400 mg, Oral, Once per day on   insulin glargine, 10 Units, Subcutaneous, Nightly  insulin lispro, 2-7 Units, Subcutaneous, 4x Daily AC & at Bedtime  levothyroxine, 150 mcg, Oral, Q AM  pantoprazole, 40 mg, Oral, Q AM  saccharomyces boulardii, 250 mg, Oral, BID  sevelamer, 800 mg, Oral, TID With Meals        Active Infusions:       As Needed Medications:    acetaminophen    senna-docusate sodium **AND** polyethylene glycol **AND** bisacodyl **AND** bisacodyl    dextrose    dextrose    glucagon (human recombinant)    heparin (porcine)    HYDROcodone-acetaminophen    ipratropium-albuterol    LORazepam    melatonin    nitroglycerin    ondansetron ODT **OR** ondansetron    sodium chloride    sodium chloride    traMADol    Vital Signs  Vital Signs Patient Vitals for the past 24 hrs:   BP Temp Temp src Pulse Resp SpO2   24 1259 155/75 98 °F (36.7 °C) -- 86 -- 95 %   24 0737 -- 98.7 °F (37.1 °C) Oral 73 17 100 %   24 0730 -- -- -- 75  17 96 %   04/14/24 0011 122/62 98.6 °F (37 °C) Oral 80 18 98 %   04/13/24 2112 165/85 98.7 °F (37.1 °C) Oral 86 18 97 %   04/13/24 2030 152/80 -- -- 87 -- --   04/13/24 1939 -- -- -- 90 18 94 %   04/13/24 1936 -- -- -- 91 16 92 %   04/13/24 1710 173/81 -- -- 85 16 94 %   04/13/24 1412 134/73 98.3 °F (36.8 °C) Oral 87 13 100 %     Vital Signs (range)  Temp:  [98 °F (36.7 °C)-98.7 °F (37.1 °C)] 98 °F (36.7 °C)  Heart Rate:  [73-91] 86  Resp:  [13-18] 17  BP: (122-173)/(62-85) 155/75  I/O:  I/O last 3 completed shifts:  In: 1524 [P.O.:1524]  Out: -   I/O:   Intake/Output Summary (Last 24 hours) at 4/14/2024 1319  Last data filed at 4/14/2024 0620  Gross per 24 hour   Intake 1524 ml   Output --   Net 1524 ml     BMI:  Body mass index is 26.93 kg/m².    Physical Exam:  Physical Exam   , Dialysis catheter site clear.  Arms stable.  Abdomen benign.  Results Review:     CBC    Results from last 7 days   Lab Units 04/14/24  0759 04/13/24 0714 04/12/24 0456 04/11/24  0640 04/10/24  0553 04/09/24  0653 04/08/24  2140   WBC 10*3/mm3 7.30 5.88 5.72 5.53 6.47 8.08 9.96   HEMOGLOBIN g/dL 8.8* 8.5* 7.8* 7.6* 7.6* 8.1* 8.6*   PLATELETS 10*3/mm3 179 165 150 148 136* 144 128*     BMP   Results from last 7 days   Lab Units 04/14/24  0759 04/13/24  0714 04/12/24  0456 04/11/24  0640 04/10/24  0553 04/09/24  0653 04/08/24  2140   SODIUM mmol/L 136 139 138 144 137 135* 134*   POTASSIUM mmol/L 4.4 4.5 4.6 4.5 4.1 3.9 4.2   CHLORIDE mmol/L 103 105 103 108* 100 100 95*   CO2 mmol/L 23.7 22.7 23.8 27.0 21.9* 22.0 24.3   BUN mg/dL 30* 22 42* 30* 62* 42* 46*   CREATININE mg/dL 4.73* 3.68* 5.51* 5.82* 7.81* 6.66* 6.31*   GLUCOSE mg/dL 103* 87 148* 119* 85 81 122*   MAGNESIUM mg/dL  --   --  2.4 1.6 1.4*  --   --    PHOSPHORUS mg/dL 3.1 3.5 3.1 3.9 7.2* 4.1  --      Cr Clearance Estimated Creatinine Clearance: 14.2 mL/min (A) (by C-G formula based on SCr of 4.73 mg/dL (H)).  Coag     HbA1C   Lab Results   Component Value Date    HGBA1C 6.60 (H)  04/10/2024    HGBA1C 6.2 (H) 04/05/2024    HGBA1C 6.70 (H) 03/22/2024     Blood Glucose   Glucose   Date/Time Value Ref Range Status   04/14/2024 1146 131 (H) 70 - 130 mg/dL Final   04/13/2024 2116 122 70 - 130 mg/dL Final   04/13/2024 1531 251 (H) 70 - 130 mg/dL Final   04/13/2024 1104 118 70 - 130 mg/dL Final   04/13/2024 0728 79 70 - 130 mg/dL Final   04/12/2024 2049 159 (H) 70 - 130 mg/dL Final   04/12/2024 1148 110 70 - 130 mg/dL Final   04/12/2024 1026 110 70 - 130 mg/dL Final     Infection   Results from last 7 days   Lab Units 04/09/24  1357 04/08/24  2206 04/08/24  2140   BLOODCX   --  No growth at 5 days  No growth at 5 days  --    BODYFLDCX  Heavy growth (4+) Candida parapsilosis*  --   --    PROCALCITONIN ng/mL  --   --  1.25*     CMP   Results from last 7 days   Lab Units 04/14/24  0759 04/13/24  0714 04/12/24  0456 04/11/24  0640 04/10/24  0553 04/09/24  0653 04/08/24  2140   SODIUM mmol/L 136 139 138 144 137 135* 134*   POTASSIUM mmol/L 4.4 4.5 4.6 4.5 4.1 3.9 4.2   CHLORIDE mmol/L 103 105 103 108* 100 100 95*   CO2 mmol/L 23.7 22.7 23.8 27.0 21.9* 22.0 24.3   BUN mg/dL 30* 22 42* 30* 62* 42* 46*   CREATININE mg/dL 4.73* 3.68* 5.51* 5.82* 7.81* 6.66* 6.31*   GLUCOSE mg/dL 103* 87 148* 119* 85 81 122*   ALBUMIN g/dL 3.1* 2.7* 2.6* 2.7* 2.3* 2.4* 2.8*   BILIRUBIN mg/dL  --   --  0.2 0.2 0.3  --  0.4   ALK PHOS U/L  --   --  75 55 53  --  55   AST (SGOT) U/L  --   --  22 20 22  --  19   ALT (SGPT) U/L  --   --  12 12 9  --  10     Radiology(recent) No radiology results for the last day    Assessment & Plan     Assessment & Plan      Peritonitis associated with peritoneal dialysis    Essential hypertension    Anemia due to chronic kidney disease    Hypothyroidism    Psoriasis    Monoclonal gammopathy of unknown significance (MGUS)    End stage renal disease    Sepsis    Type 2 diabetes mellitus, with long-term current use of insulin    GERD (gastroesophageal reflux disease)    Immunosuppression due to  drug therapy    Bipolar disorder    Chronic respiratory failure with hypoxia      79 y.o. male status post right tunneled catheter after removal of infected peritoneal dialysis catheter.  His AV fistula mapping is actually very good.  He has some veins on both sides at the antecubital space with primary access sites likely being his cephalic veins.  He is right-hand dominant we will preserve his left arm.  If he agrees he could potentially potentially have this done while inpatient but also could be easily done outpatient.  Dr. Lane who saw him initially in consult will go over these options with him and pursue whichever he and Dr. Lane agree on.  I did do over the risk benefits complications of the fistula placement with him including the risks of bleeding infection nerve injury skin injury failure of the fistula to group growing mature as well as steal syndrome.  Will await discussion with Dr. Domingo Vinson MD  04/14/24  13:19 EDT    Please call my office with any question: [phone]    Active Hospital Problems    Diagnosis  POA    **Peritonitis associated with peritoneal dialysis [T85.71XA]  Yes    Sepsis [A41.9]  Yes    Type 2 diabetes mellitus, with long-term current use of insulin [E11.9, Z79.4]  Not Applicable    GERD (gastroesophageal reflux disease) [K21.9]  Yes    Immunosuppression due to drug therapy [D84.821, Z79.899]  Not Applicable    Bipolar disorder [F31.9]  Yes    Chronic respiratory failure with hypoxia [J96.11]  Yes    End stage renal disease [N18.6]  Yes    Essential hypertension [I10]  Yes    Anemia due to chronic kidney disease [N18.9, D63.1]  Yes    Psoriasis [L40.9]  Yes    Monoclonal gammopathy of unknown significance (MGUS) [D47.2]  Yes    Hypothyroidism [E03.9]  Yes      Resolved Hospital Problems    Diagnosis Date Resolved POA    Spontaneous bacterial peritonitis [K65.2] 04/09/2024 Yes    Peritoneal dialysis catheter in place [Z99.2] 04/09/2024 Not Applicable

## 2024-04-15 ENCOUNTER — TRANSITIONAL CARE MANAGEMENT TELEPHONE ENCOUNTER (OUTPATIENT)
Dept: CALL CENTER | Facility: HOSPITAL | Age: 79
End: 2024-04-15
Payer: MEDICARE

## 2024-04-15 NOTE — OUTREACH NOTE
Call Center TCM Note      Flowsheet Row Responses   [facility] patient discharged from? [place]   Does the patient have one of the following disease processes/diagnoses(primary or secondary)? General Surgery   TCM attempt successful? No   Unsuccessful attempts Attempt 2            Cornelius Shook RN    4/15/2024, 16:37 EDT

## 2024-04-15 NOTE — OUTREACH NOTE
Prep Survey      Flowsheet Row Responses   [facility] patient discharged from? [place]   Is LACE score < 7 ? No   Eligibility [facility]   Date of Admission 04/08/24   Date of Discharge 04/14/24   Discharge Disposition Home or Self Care   Discharge diagnosis Peritonitis associated with peritoneal dialysis, REMOVAL PERITONEAL DIALYSIS CATHETER, Tunneled hemodialysis catheter insertion   Does the patient have one of the following disease processes/diagnoses(primary or secondary)? General Surgery   Does the patient have Home health ordered? Yes   What is the Home health agency?  [facility]   Is there a DME ordered? No   General alerts for this patient new HD patient   Prep survey completed? Yes            Kaycee ESPINOSA - Registered Nurse

## 2024-04-15 NOTE — OUTREACH NOTE
Call Center TCM Note      Flowsheet Row Responses   [facility] patient discharged from? [place]   Does the patient have one of the following disease processes/diagnoses(primary or secondary)? General Surgery   TCM attempt successful? No  [no verbal release on file]   Unsuccessful attempts Attempt 1            Estephania Arzate RN    4/15/2024, 09:03 EDT

## 2024-04-16 ENCOUNTER — TRANSITIONAL CARE MANAGEMENT TELEPHONE ENCOUNTER (OUTPATIENT)
Dept: CALL CENTER | Facility: HOSPITAL | Age: 79
End: 2024-04-16
Payer: MEDICARE

## 2024-04-16 NOTE — OUTREACH NOTE
Call Center TCM Note      Flowsheet Row Responses   [facility] patient discharged from? [place]   Does the patient have one of the following disease processes/diagnoses(primary or secondary)? General Surgery   TCM attempt successful? No   Unsuccessful attempts Attempt 3            Cornelius Shook RN    4/16/2024, 08:42 EDT

## 2024-04-19 ENCOUNTER — OFFICE VISIT (OUTPATIENT)
Dept: SLEEP MEDICINE | Facility: HOSPITAL | Age: 79
End: 2024-04-19
Payer: MEDICARE

## 2024-04-19 VITALS
OXYGEN SATURATION: 94 % | BODY MASS INDEX: 25.77 KG/M2 | SYSTOLIC BLOOD PRESSURE: 180 MMHG | HEIGHT: 67 IN | HEART RATE: 63 BPM | DIASTOLIC BLOOD PRESSURE: 68 MMHG | WEIGHT: 164.2 LBS

## 2024-04-19 DIAGNOSIS — R06.81 WITNESSED EPISODE OF APNEA: ICD-10-CM

## 2024-04-19 DIAGNOSIS — R06.83 SNORING: ICD-10-CM

## 2024-04-19 DIAGNOSIS — I16.0 HYPERTENSIVE URGENCY: ICD-10-CM

## 2024-04-19 DIAGNOSIS — N18.6 ESRD (END STAGE RENAL DISEASE): ICD-10-CM

## 2024-04-19 DIAGNOSIS — I10 ESSENTIAL HYPERTENSION: ICD-10-CM

## 2024-04-19 DIAGNOSIS — H91.90 HEARING LOSS, UNSPECIFIED HEARING LOSS TYPE, UNSPECIFIED LATERALITY: ICD-10-CM

## 2024-04-19 DIAGNOSIS — R29.818 SUSPECTED SLEEP APNEA: Primary | ICD-10-CM

## 2024-04-19 DIAGNOSIS — G47.19 EXCESSIVE DAYTIME SLEEPINESS: ICD-10-CM

## 2024-04-19 DIAGNOSIS — G47.50 PARASOMNIA, UNSPECIFIED TYPE: ICD-10-CM

## 2024-04-19 DIAGNOSIS — Z72.821 INADEQUATE SLEEP HYGIENE: ICD-10-CM

## 2024-04-19 PROCEDURE — G0463 HOSPITAL OUTPT CLINIC VISIT: HCPCS

## 2024-04-19 RX ORDER — ASPIRIN 81 MG/1
1 TABLET, CHEWABLE ORAL DAILY
COMMUNITY
Start: 2024-03-11

## 2024-04-19 RX ORDER — LIDOCAINE 5% 5 G/100G
CREAM TOPICAL 3 TIMES DAILY
COMMUNITY
Start: 2024-02-28

## 2024-04-19 RX ORDER — LORAZEPAM 0.5 MG/1
1 TABLET ORAL
COMMUNITY
Start: 2024-03-11

## 2024-04-19 RX ORDER — ALLOPURINOL 100 MG/1
1 TABLET ORAL DAILY
COMMUNITY
Start: 2024-03-11

## 2024-04-19 RX ORDER — BENZONATATE 100 MG/1
1 CAPSULE ORAL 3 TIMES DAILY
COMMUNITY
Start: 2024-03-11

## 2024-04-19 RX ORDER — TORSEMIDE 100 MG/1
1 TABLET ORAL DAILY
COMMUNITY
Start: 2024-03-11

## 2024-04-19 RX ORDER — FAMOTIDINE 20 MG/1
1 TABLET, FILM COATED ORAL
COMMUNITY
Start: 2024-03-11

## 2024-04-19 NOTE — PROGRESS NOTES
[facility]  [address] [place] [address]  [place]   [place] [address]  Phone: [phone]  Fax: [phone]      Nelson Wang  [id number]   1945  79 y.o.  male      Referring physician/provider and  PCP Janna Mo MD    Type of service: Initial Sleep Medicine Consult.  Date of service: 4/19/2024      Chief Complaint   Patient presents with    Snoring    Witnessed Apnea       Historians in today's encounter: Patient and Dempi (Daughter who works as hospital ist)  Prior to the onset of the encounter I made sure that the patient provided verbal consent to me to discuss all of the patient's medical information to include any sensitive information/topics in front of the above noted individual also in the room. The patient insisted the above individual stay in the room for entire the encounter to completion.       History of present illness;  The patient was seen today on 4/19/2024 at [facility].    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  The patient presents for initial evaluation of sleep sleep disordered breathing.  Patient  denies prior surgery namely tonsillectomy, nasal surgery or UPPP.     Loud snoring by his wife  His wife has been concerned for movements in his sleep   He's had a possible apneic episode versus jerking motion in sleep that hit his wife without causing major injury (his wife  no longer sleeps in the same bed) - rare  No injuries to self   No falls out of bed  No ambulation out of bed   Patient NEVER had sleep study  Recently had episode of pneumonia doing well   Never on home O2  30 year ~1/2 ppd cigarette smoker quit 30 years ago     Obstructive Sleep Apnea Screening: STOP-BANG Sleep Apnea Questionnaire. Reference: Mike F et al. Br J Anaesth, 2012.     Criterion    Yes     No  Do you SNORE loudly?   [x]   Yes  []   No   Do you often feel TIRED, fatigued, or sleepy during the day?    [x]   Yes  []   No  Has anyone OBSERVED you stop breathing during your sleep?    [x]   Yes  []   No  Do you have or are you being treated for high blood PRESSURE?    [x]   Yes  []   No  BMI >32 kg/m2     []   Yes  [x]   No  AGE > 50 years    [x]   Yes  []   No  NECK circumference >16 inches / 40 cm    []   Yes  [x]   No  GENDER: male     [x]   Yes  []   No    GASTON Probability:  []   1-2 - Low  []   3-4 - Intermediate  [x]   5-8 - High      BP in sleep clinic 180/68 relatively asymptomatic states he feels good today   Denies any chest pain/dyspnea/confusion  On ESRD   Compliant with home therapies reviewed  Still makes urine   Going to HD tomorrow     Denies any past cardiopulmonary conditions/neurologic disorders/neuromuscular disorders  Never needed supplemental O2 at home  Denies any opioid therapy  Denies any metal in head/neck/chest  Denies PPM/AICD/Cochlear implant       Further Sleep History:    Bedtime: 9 PM  Rise Time: 8 AM  Sleep Latency: 30 minutes-2 hours  Screens in bed: Yes gets on the phone   Wake after sleep onset: Frequently unable to quantify  Reasons for awakenings: Unable to specify  Number of naps per day 2 naps per day 30 minutes / 2 hours  Naps restorative: Denies  Caffeine use: 2 beverages per day - 5 pm 1 cup of tea or coffee     Screen per my clinical judgement and ISCD criteria:   RLS Symptoms: No   Bruxism: Possible has not talked with dentist likely untreated sleep disordered breathing   Current sleep related gastroesophageal reflux symptoms:  No   Cataplexy:  No   Sleep Paralysis:  No   Hypnagogic or hypnopompic hallucinations: NO  Parasomnias: Possible see HPI. no ambulation out of bed movements in sleep noted by his bed partner laterality of the night unable to specify, no injuries to self or bed partner   RBD screen: No    Disclaimer Sleep History: The above sleep history is  "based on this sleep physician's in room encounter with the patient. Pre encounter self administered questionnaires are taken into consideration and discussed with patient for any discordance. The above documentation by this sleep physician is the most accurate clinical information determined by in room sleep physician encounter with patient.     MEDICAL CONDITIONS (PMH)   HTN  ESRD  Bipolar disorder  IDDM    Social history:  Do you drive a commercial vehicle:  No   Shift work:  No   Tobacco use: No former 30 years, 1/2 ppd or less for 30 years   Alcohol use: Rarely  Occupation: retired former physician    Family Hx (parents and siblings) (pertaining to sleep medicine)  Sleep apnea - daughter in room  Thyroid disorder  Insomnia  Sleepwalking    Medications: reviewed    Review of systems is negative unless otherwise noted per HPI   Disclaimer History: The above history is based on this sleep physician's in room encounter with the patient. Pre encounter self administered questionnaires are taken into consideration and discussed with patient for any discordance. The above documentation by this sleep physician is the most accurate clinical information determined by in room sleep physician encounter with patient.     Physical exam:  Vitals:    04/19/24 1300   BP: 180/68   Pulse: 63   SpO2: 94%   Weight: 74.5 kg (164 lb 3.2 oz)   Height: 170.2 cm (67\")    Body mass index is 25.72 kg/m².   CONSTITUTIONAL:  Non-toxic, In no overt distress   Head: normocephalic   ENT: Mallampati class IV, + macroglossia, no septal defects   NECK:Neck Circumference: 14.5 inches,no nuchal rigidity  RESPIRATORY SYSTEM: Breath sounds are diminished at bilateral bases (mild rare inspiratory rales at bases), speaks in full sentences without any dyspnea no accessory muscle use  CARDIOVASULAR SYSTEM: Heart sounds are regular rhythm and normal rate, no rub, no gallop, no edema  NEUROLOGICAL SYSTEM: Oriented x 3, No gross focal deficits, hearing " impaired wears hearing aids forgot it today can hear me fine when speaking directly to him face to face   Skin: generalized appearance consistent with history of vitiligo  PSYCHIATRIC SYSTEM: Goal oriented, affect full range appropriate      Office notes from care team reviewed:    -4/9/2024 referral by PCP Dr. Janna Mo for snoring    Labs reviewed.  TSH          3/6/2024    05:22 3/22/2024    10:19 4/10/2024    05:53   TSH   TSH 8.500  13.900  19.400       Most Recent A1C          4/10/2024    05:53   HGBA1C Most Recent   Hemoglobin A1C 6.60         Imaging/Diagnostics reviewed:     -2/16/2024 echocardiogram cardiologist's interpretation summary:     Left ventricular systolic function is normal. Calculated left ventricular EF = 56.6%    Left ventricular wall thickness is consistent with mild concentric hypertrophy.    Left ventricular diastolic function is consistent with (grade I) impaired relaxation and age.    Aortic valve sclerosis with minimal aortic valve stenosis is present.      Assessment and plan:  Suspected sleep apnea [R29.818]/Parasomnia, unspecified type [G47.50]   patient's symptoms and examination is consistent with sleep apnea (G47.30). I have talked to the patient about the signs and symptoms of sleep apnea. In addition, I have also discussed pathophysiology of sleep apnea.  I also discussed the complications of untreated sleep apnea including effects on hypertension, diabetes mellitus and nonrestorative sleep with hypersomnia which can increase risk for motor vehicle accidents.  Untreated sleep apnea is also a risk factor for development of atrial fibrillation, hypertension, insulin resistance and cerebrovascular accident.  Discussed in detail of various testing methods including home-based and lab based sleep studies.  Based on history and physical examination and other comorbidities the most appropriate study is to order SPLIT AHI > 15 in laboratory polysomnography, rather than home sleep  apnea testing,to rule out diagnosis of sleep apnea per AASM clinical practice guidelines (doi:[email or web address]/jcsm.[phone]) secondary to patient’s history of: rule out parasomnia, most certainly patient has at least moderate severity sleep disordered breathing. All questions concerns answered for patient and family.   -One PSG tech to One patient  -Both upper and lower limb EMG   -Counseling with emphasis of safety for parasomnia versus potential sleep disordered breathing events in lab sleep study above   Snoring (R06.83), snoring is the sound created by turbulent airflow vibrating upper airway soft tissue due to limitation of inspiratory airflow. I have also discussed factors affecting snoring including sleep deprivation, sleeping on the back and alcohol ingestion. To minimize snoring, patient is advised to have adequate sleep, sleep on the side and avoid alcohol and sedative medications before bedtime  Excessive daytime sleepiness .  Patient endorses subjective excessive daytime sleepiness with sleep physician encounter which was consistent with patient's pre-encounter self-administered Minden Sleepiness Scale of Total score: 12.  There are many causes for daytime excessive sleepiness including depression, shiftwork syndrome, and other medical disorders including heart, kidney and liver failure.  From sleep disorders perspective this is sleep disordered breathing until proven otherwise. The most common cause of excessive sleepiness is due to sleep apnea with frequent awakenings during sleep time.  I have discussed safety of driving and to remain vigilant while driving; patient verbalized understanding of counseling.  Inadequate sleep hygiene [Z72.821]  Counseled the patient lifestyle modifications as below to be applied especially night of any sleep study, the patient verbalized understanding of same. Follow up with PCP to reinforce my counseling towards healthy lifestyle modifications if sleep studies are  negative.  Overweight BMI 25.0-25.9,adult , Body mass index is 25.72 kg/m²..  Geriatric male healthy lifestyle modification emphasized over weight loss.  Follow-up with PCP to review preventive care.  Hypertensive urgency/Essential HTN, Counseled to follow-up PCP/nephrologist after this visit. Counseled ED/911 for alarm signs/symptoms reviewed with him should they develop (none today). This medical condition would make the patient eligible for a trial of PAP therapy even if sleep study reveals mild severity sleep apnea.  Bipolar disorder, Follow up with primary care physician for continued management. Comorbid mood disorders would make the patient eligible for a trial of PAP therapy even if sleep study reveals mild severity sleep apnea.   ESRD, Schedule PSG so it does not interfere with HD. Follow up with PCP/Nephrology.  Hearing impaired, no cochlear implant. Wears hearing aids. No PAP mask restrictions follow up with PCP.       I have also discussed with the patient the following  Sleep hygiene: Maintaining a regular bedtime and wake time, not to watch television or work in bed, limit caffeine-containing beverages before bed time and avoid naps during the day  Adequate amount of sleep.  Generally most people needs about 7 to 8 hours of sleep.      Return for 31 to 90 days after PAP setup with down load.  Patient's questions were answered      I once again thank you for asking me to see this patient in consultation and I have forwarded my opinion and treatment plan.  Please do not hesitate to call me if you have any questions.       EMR Dragon/Transcription disclaimer:   Much of this encounter note is an electronic transcription/translation of spoken language to printed text. The electronic translation of spoken language may permit erroneous, or at times, nonsensical words or phrases to be inadvertently transcribed; Although I have reviewed the note for such errors, some may still exist.     NPI #:  [id number]    Gomez Conroy, DO  Sleep Medicine  [facility]  04/19/24

## 2024-04-24 ENCOUNTER — OFFICE VISIT (OUTPATIENT)
Dept: INTERNAL MEDICINE | Facility: CLINIC | Age: 79
End: 2024-04-24
Payer: MEDICARE

## 2024-04-24 ENCOUNTER — PATIENT ROUNDING (BHMG ONLY) (OUTPATIENT)
Dept: INTERNAL MEDICINE | Facility: CLINIC | Age: 79
End: 2024-04-24
Payer: MEDICARE

## 2024-04-24 ENCOUNTER — HOSPITAL ENCOUNTER (OUTPATIENT)
Dept: SLEEP MEDICINE | Facility: HOSPITAL | Age: 79
Discharge: HOME | End: 2024-04-24
Admitting: FAMILY MEDICINE
Payer: MEDICARE

## 2024-04-24 VITALS
HEIGHT: 67 IN | HEART RATE: 73 BPM | TEMPERATURE: 99.8 F | DIASTOLIC BLOOD PRESSURE: 68 MMHG | BODY MASS INDEX: 25.52 KG/M2 | OXYGEN SATURATION: 91 % | WEIGHT: 162.6 LBS | SYSTOLIC BLOOD PRESSURE: 140 MMHG

## 2024-04-24 DIAGNOSIS — D63.1 ANEMIA DUE TO STAGE 5 CHRONIC KIDNEY DISEASE, NOT ON CHRONIC DIALYSIS: ICD-10-CM

## 2024-04-24 DIAGNOSIS — N18.5 ANEMIA DUE TO STAGE 5 CHRONIC KIDNEY DISEASE, NOT ON CHRONIC DIALYSIS: ICD-10-CM

## 2024-04-24 DIAGNOSIS — F31.9 BIPOLAR AFFECTIVE DISORDER, REMISSION STATUS UNSPECIFIED: ICD-10-CM

## 2024-04-24 DIAGNOSIS — Z99.2 ESRD (END STAGE RENAL DISEASE) ON DIALYSIS: ICD-10-CM

## 2024-04-24 DIAGNOSIS — Z76.89 ESTABLISHING CARE WITH NEW DOCTOR, ENCOUNTER FOR: Primary | ICD-10-CM

## 2024-04-24 DIAGNOSIS — R06.83 SNORING: ICD-10-CM

## 2024-04-24 DIAGNOSIS — E11.9 TYPE 2 DIABETES MELLITUS WITHOUT COMPLICATION, WITH LONG-TERM CURRENT USE OF INSULIN: ICD-10-CM

## 2024-04-24 DIAGNOSIS — I10 ESSENTIAL HYPERTENSION: ICD-10-CM

## 2024-04-24 DIAGNOSIS — G47.19 EXCESSIVE DAYTIME SLEEPINESS: ICD-10-CM

## 2024-04-24 DIAGNOSIS — E78.5 HYPERLIPIDEMIA, UNSPECIFIED HYPERLIPIDEMIA TYPE: ICD-10-CM

## 2024-04-24 DIAGNOSIS — T85.71XD PERITONITIS ASSOCIATED WITH PERITONEAL DIALYSIS, SUBSEQUENT ENCOUNTER: ICD-10-CM

## 2024-04-24 DIAGNOSIS — R06.81 WITNESSED EPISODE OF APNEA: ICD-10-CM

## 2024-04-24 DIAGNOSIS — N18.6 ESRD (END STAGE RENAL DISEASE) ON DIALYSIS: ICD-10-CM

## 2024-04-24 DIAGNOSIS — R29.818 SUSPECTED SLEEP APNEA: ICD-10-CM

## 2024-04-24 DIAGNOSIS — E03.9 ACQUIRED HYPOTHYROIDISM: ICD-10-CM

## 2024-04-24 DIAGNOSIS — Z79.4 TYPE 2 DIABETES MELLITUS WITHOUT COMPLICATION, WITH LONG-TERM CURRENT USE OF INSULIN: ICD-10-CM

## 2024-04-24 DIAGNOSIS — Z48.02 VISIT FOR SUTURE REMOVAL: ICD-10-CM

## 2024-04-24 DIAGNOSIS — G47.50 PARASOMNIA, UNSPECIFIED TYPE: ICD-10-CM

## 2024-04-24 DIAGNOSIS — L40.9 PSORIASIS: ICD-10-CM

## 2024-04-24 PROCEDURE — 3077F SYST BP >= 140 MM HG: CPT | Performed by: STUDENT IN AN ORGANIZED HEALTH CARE EDUCATION/TRAINING PROGRAM

## 2024-04-24 PROCEDURE — 1111F DSCHRG MED/CURRENT MED MERGE: CPT | Performed by: STUDENT IN AN ORGANIZED HEALTH CARE EDUCATION/TRAINING PROGRAM

## 2024-04-24 PROCEDURE — 95810 POLYSOM 6/> YRS 4/> PARAM: CPT

## 2024-04-24 PROCEDURE — 99495 TRANSJ CARE MGMT MOD F2F 14D: CPT | Performed by: STUDENT IN AN ORGANIZED HEALTH CARE EDUCATION/TRAINING PROGRAM

## 2024-04-24 PROCEDURE — 3078F DIAST BP <80 MM HG: CPT | Performed by: STUDENT IN AN ORGANIZED HEALTH CARE EDUCATION/TRAINING PROGRAM

## 2024-04-24 PROCEDURE — 15853 REMOVAL SUTR/STAPL XREQ ANES: CPT | Performed by: STUDENT IN AN ORGANIZED HEALTH CARE EDUCATION/TRAINING PROGRAM

## 2024-04-24 RX ORDER — CARVEDILOL 6.25 MG/1
6.25 TABLET ORAL DAILY
Start: 2024-04-24 | End: 2024-04-29 | Stop reason: SDUPTHER

## 2024-04-24 RX ORDER — ATORVASTATIN CALCIUM 40 MG/1
40 TABLET, FILM COATED ORAL DAILY
Qty: 90 TABLET | Refills: 1 | Status: SHIPPED | OUTPATIENT
Start: 2024-04-24 | End: 2024-04-29 | Stop reason: SDUPTHER

## 2024-04-24 RX ORDER — TORSEMIDE 100 MG/1
100 TABLET ORAL DAILY
Qty: 90 TABLET | Refills: 2 | Status: SHIPPED | OUTPATIENT
Start: 2024-04-24

## 2024-04-24 RX ORDER — LEVOTHYROXINE SODIUM 0.15 MG/1
150 TABLET ORAL
Qty: 30 TABLET | Refills: 0 | Status: SHIPPED | OUTPATIENT
Start: 2024-04-24 | End: 2024-05-24

## 2024-04-24 RX ORDER — RISANKIZUMAB-RZAA 150 MG/ML
150 INJECTION SUBCUTANEOUS
COMMUNITY

## 2024-04-24 RX ORDER — ATORVASTATIN CALCIUM 40 MG/1
40 TABLET, FILM COATED ORAL DAILY
COMMUNITY
End: 2024-04-24 | Stop reason: SDUPTHER

## 2024-04-24 NOTE — PROGRESS NOTES
Transitional Care Follow Up Visit  Subjective     Nelson RHODES Felipe is a 79 y.o. male who presents for a transitional care management visit.    Within 48 business hours after discharge our office contacted him via telephone to coordinate his care and needs.      I reviewed and discussed the details of that call along with the discharge summary, hospital problems, inpatient lab results, inpatient diagnostic studies, and consultation reports with Nelson.     Current outpatient and discharge medications have been reconciled for the patient.  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  He presented with signs of sepsis as well as hallucinations.  Workup notable for candidal infection, thought to be 2/2 to peritoneal dialysis.    He was evaluated by ID, who placed him on micafungin.  Repeat fluid cx consistent with candida parapapsilosis sensitive to micafungin and fluconazole.  General surgery consulted and PD catheter was removed.  Vascular surgery consulted, and had nontunneled right IJ dialysis catheter placed, later replaced with tunneled dialysis catheter towards the end of his hospital stay.  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  He also showed marked improvement in mentation.  Confusion and hallucination resolved.    ID transitioned him to fluconazole to be taken 3 times weekly after dialysis.  This will continue  "through 5/1/24.  Vascular surgery is in the process of evaluating Dr. Wang for an AV fistula, and this will be pursued on an outpatient basis.  At the time of discharge, Dr. Wang was stable, and cleared for d/c by consultants.    Interim Course:    Doing well since discharge.  Now getting dialysis Tuesday/Thursday/Saturday.  Missed CT appointment.  Doesn't have vascular follow up scheduled yet.  Has a suture in his neck from this past hospitalization that they would like removed.     The following portions of the patient's history were reviewed and updated as appropriate: allergies, current medications, past family history, past medical history, past social history, past surgical history, and problem list.    Review of Systems    Objective   Physical Exam  /68 (BP Location: Right arm, Patient Position: Sitting, Cuff Size: Adult)   Pulse 73   Temp 99.8 °F (37.7 °C) (Temporal)   Ht 170.2 cm (67\")   Wt 73.8 kg (162 lb 9.6 oz)   SpO2 91%   BMI 25.47 kg/m²       Physical Exam  Vitals reviewed.   Constitutional:       General: He is not in acute distress.     Appearance: Normal appearance. He is not ill-appearing, toxic-appearing or diaphoretic.   HENT:      Head: Normocephalic and atraumatic.      Right Ear: External ear normal.      Left Ear: External ear normal.   Eyes:      Conjunctiva/sclera: Conjunctivae normal.   Neck:      Comments: Right sided tunneled dialysis catheter noted.  Above this on neck, a well healed site with one suture noted.  Suture removed today  Cardiovascular:      Rate and Rhythm: Normal rate and regular rhythm.      Pulses: Normal pulses.      Heart sounds: Normal heart sounds. No murmur heard.     No friction rub. No gallop.   Pulmonary:      Effort: Pulmonary effort is normal. No respiratory distress.      Breath sounds: Normal breath sounds. No stridor. No wheezing, rhonchi or rales.   Chest:      Chest wall: No tenderness.   Abdominal:      General: Abdomen is flat.      " Palpations: Abdomen is soft. There is no mass.      Tenderness: There is no abdominal tenderness.      Comments: 3 surgical sites noted on left side of abdomen.  One with very mild sanguinous oozing.  The other two well healed.  No visible sutures in any of these areas.       Musculoskeletal:      Right lower leg: No edema.      Left lower leg: No edema.   Skin:     General: Skin is warm and dry.      Comments: Vitiligo noted   Neurological:      General: No focal deficit present.      Mental Status: He is alert. Mental status is at baseline.   Psychiatric:         Behavior: Behavior normal.         Thought Content: Thought content normal.         Judgment: Judgment normal.         Suture Removal    Date/Time: 4/24/2024 12:15 PM    Performed by: Domingo Bravo MD  Authorized by: Domingo Bravo MD  Body area: head/neck  Location details: neck  Wound Appearance: clean  Sutures Removed: 1  Post-removal: dressing applied  Patient tolerance: patient tolerated the procedure well with no immediate complications           Assessment & Plan   Problems Addressed this Visit       Essential hypertension    Relevant Medications    carvedilol (COREG) 6.25 MG tablet    torsemide (DEMADEX) 100 MG tablet    Anemia due to chronic kidney disease    Hypothyroidism    Relevant Medications    levothyroxine (SYNTHROID, LEVOTHROID) 150 MCG tablet    carvedilol (COREG) 6.25 MG tablet    Psoriasis    Relevant Medications    Risankizumab-rzaa (Skyrizi) 150 MG/ML solution prefilled syringe    Type 2 diabetes mellitus without complication    Hyperlipidemia    Relevant Medications    atorvastatin (LIPITOR) 40 MG tablet    Peritonitis associated with peritoneal dialysis    Bipolar disorder     Other Visit Diagnoses       Establishing care with new doctor, encounter for    -  Primary    ESRD (end stage renal disease) on dialysis        Relevant Medications    torsemide (DEMADEX) 100 MG tablet    Visit for suture removal        Relevant  Orders    Suture Removal          Diagnoses         Codes Comments    Establishing care with new doctor, encounter for    -  Primary ICD-10-CM: Z76.89  ICD-9-CM: V65.8     Type 2 diabetes mellitus without complication, with long-term current use of insulin     ICD-10-CM: E11.9, Z79.4  ICD-9-CM: 250.00, V58.67     Acquired hypothyroidism     ICD-10-CM: E03.9  ICD-9-CM: 244.9     Essential hypertension     ICD-10-CM: I10  ICD-9-CM: 401.9     ESRD (end stage renal disease) on dialysis     ICD-10-CM: N18.6, Z99.2  ICD-9-CM: 585.6, V45.11     Peritonitis associated with peritoneal dialysis, subsequent encounter     ICD-10-CM: T85.71XD  ICD-9-CM: V58.89, 996.68     Visit for suture removal     ICD-10-CM: Z48.02  ICD-9-CM: V58.32     Psoriasis     ICD-10-CM: L40.9  ICD-9-CM: 696.1     Anemia due to stage 5 chronic kidney disease, not on chronic dialysis     ICD-10-CM: N18.5, D63.1  ICD-9-CM: 585.5, 285.21     Hyperlipidemia, unspecified hyperlipidemia type     ICD-10-CM: E78.5  ICD-9-CM: 272.4     Bipolar affective disorder, remission status unspecified     ICD-10-CM: F31.9  ICD-9-CM: 296.80             Peritonitis:  -improved, no abdominal pain  -getting diflucan with dialysis procedures    ESRD:  -HD Tues/Th/Sat  -[facility] to [facility] in nephrology  -recommended that they call vascular about scheduling follow up  -missed CT.  Recommended they call to reschedule    HTN:  -BP mildly elevated today  -recommended that they discuss with nephro    T2DM:  -A1c is 6.2% ( goal is < 7% )  -blood sugars well controlled  -will continue lantus (using 10-15U daily)    Psoriasis:  -on skyrizi    Suture removal:  -removed one suture from neck today    Return in about 3 months (around 7/24/2024) for Type 2 Diabetes, ESRD, Hypertension.

## 2024-04-24 NOTE — PROGRESS NOTES
Chief Complaint  Diabetes, Hypertension, Hypothyroidism, and Suture / Staple Removal (Patient has stitches he wants removed )    Subjective          Nelson Wang presents to [facility]  History of Present Illness    Previous PCP: Dr. Mo   Specialist(s): Gastro, Pulm, Nephrology, Cardiology, dermatology, sleep study appt is coming up   COVID vaccine: completed, including booster  Pneumonia vaccine: done 20  Shingles vaccine: A couple years ago   Colon cancer screenin24 - fecal occult test, has a colonoscopy coming up in July     []sutures  []bandages     10-15U lantus    []100          Current Outpatient Medications   Medication Instructions    allopurinol (ZYLOPRIM) 100 MG tablet 1 tablet, Oral, Daily    ARIPiprazole (ABILIFY) 2 mg, Oral, 2 Times Daily    aspirin 81 MG chewable tablet 1 tablet, Oral, Daily    Beclomethasone Diprop HFA (QVAR Redihaler) 40 MCG/ACT inhaler 2 puffs, Inhalation, 2 Times Daily - RT    benzonatate (TESSALON) 100 MG capsule 1 capsule, Oral, 3 Times Daily    carvedilol (COREG) 6.25 mg, Oral, Daily    famotidine (Pepcid) 20 MG tablet 1 tablet, Oral    Florastor 250 mg, Oral, 2 Times Daily    fluconazole (DIFLUCAN) 200 MG tablet Take 1 tablet by mouth 3 (Three) Times a Week. Take AFTER dialysis on Tuesday, Thursday, and . Indications: Infection Within the Abdomen    Insulin Glargine (LANTUS SOLOSTAR) 10 Units, Subcutaneous, Nightly    ipratropium-albuterol (DUO-NEB) 0.5-2.5 mg/3 ml nebulizer 3 mL, Nebulization, Every 4 Hours PRN    levothyroxine (SYNTHROID, LEVOTHROID) 150 mcg, Oral, Every Early Morning    Lidocaine 5 % cream Topical, 3 Times Daily    LORazepam (ATIVAN) 0.5 MG tablet 1 tablet, Oral    Methoxy PEG-Epoetin Beta (MIRCERA IJ) 30 mcg, Every 14 Days    nystatin (MYCOSTATIN) 100,000 unit/mL suspension 5 mL, Oral, 3 Times Daily    sevelamer (RENVELA) 800 mg, Oral, 3 Times Daily With Meals    torsemide (DEMADEX)  "100 MG tablet 1 tablet, Oral, Daily       The following portions of the patient's history were reviewed and updated as appropriate: allergies, current medications, past family history, past medical history, past social history, past surgical history, and problem list.    Objective   Vital Signs:   Ht 170.2 cm (67\")   Wt 73.8 kg (162 lb 9.6 oz)   BMI 25.47 kg/m²     BP Readings from Last 3 Encounters:   04/19/24 180/68   04/14/24 174/93   03/24/24 171/83     Wt Readings from Last 3 Encounters:   04/24/24 73.8 kg (162 lb 9.6 oz)   04/19/24 74.5 kg (164 lb 3.2 oz)   04/14/24 71.7 kg (158 lb 1.1 oz)           Physical Exam  Vitals reviewed.   Constitutional:       General: He is not in acute distress.     Appearance: Normal appearance. He is not ill-appearing, toxic-appearing or diaphoretic.   HENT:      Head: Normocephalic and atraumatic.      Right Ear: External ear normal.      Left Ear: External ear normal.   Eyes:      Conjunctiva/sclera: Conjunctivae normal.   Neck:      Comments: Right sided tunneled dialysis catheter noted.  Above this on neck, a well healed site with one suture noted.  Suture removed today  Cardiovascular:      Rate and Rhythm: Normal rate and regular rhythm.      Pulses: Normal pulses.      Heart sounds: Normal heart sounds. No murmur heard.     No friction rub. No gallop.   Pulmonary:      Effort: Pulmonary effort is normal. No respiratory distress.      Breath sounds: Normal breath sounds. No stridor. No wheezing, rhonchi or rales.   Chest:      Chest wall: No tenderness.   Abdominal:      General: Abdomen is flat.      Palpations: Abdomen is soft. There is no mass.      Tenderness: There is no abdominal tenderness.      Comments: 3 surgical sites noted on left side of abdomen.  One with very mild sanguinous oozing.  The other two well healed.  No visible sutures in any of these areas.       Musculoskeletal:      Right lower leg: No edema.      Left lower leg: No edema.   Skin:     " General: Skin is warm and dry.      Comments: Vitiligo noted   Neurological:      General: No focal deficit present.      Mental Status: He is alert. Mental status is at baseline.   Psychiatric:         Behavior: Behavior normal.         Thought Content: Thought content normal.         Judgment: Judgment normal.          Result Review :   The following data was reviewed by: Laura Mello MA on 04/24/2024:  Common labs          4/13/2024    07:14 4/14/2024    07:59 4/18/2024    00:00   Common Labs   Glucose 87  103     BUN 22  30  33       Creatinine 3.68  4.73     Sodium 139  136     Potassium 4.5  4.4     Chloride 105  103     Calcium 8.8  9.2     Albumin 2.7  3.1     WBC 5.88  7.30     Hemoglobin 8.5  8.8  8.2        24.6       Hematocrit 26.6  28.3     Platelets 165  179        Details          This result is from an external source.                    Lab Results   Component Value Date    COVID19 Not Detected 04/08/2024    RSV Not Detected 04/08/2024    INR 1.13 02/29/2024    BILIRUBINUR Negative 04/09/2024       Suture Removal    Date/Time: 4/24/2024 12:15 PM    Performed by: Domingo Bravo MD  Authorized by: Domingo Bravo MD  Body area: head/neck  Location details: neck  Wound Appearance: clean  Sutures Removed: 1  Post-removal: dressing applied  Patient tolerance: patient tolerated the procedure well with no immediate complications              Assessment and Plan    Diagnoses and all orders for this visit:    1. Establishing care with new doctor, encounter for (Primary)    2. Type 2 diabetes mellitus without complication, with long-term current use of insulin    3. Acquired hypothyroidism    4. Essential hypertension    5. ESRD (end stage renal disease) on dialysis    6. Peritonitis associated with peritoneal dialysis, subsequent encounter          There are no discontinued medications.       Follow Up   No follow-ups on file.  Patient was given instructions and counseling regarding his condition or  for health maintenance advice. Please see specific information pulled into the AVS if appropriate.       Laura Mello MA  04/24/24  11:08 EDT

## 2024-04-26 PROCEDURE — 95810 POLYSOM 6/> YRS 4/> PARAM: CPT | Performed by: FAMILY MEDICINE

## 2024-04-26 NOTE — PROGRESS NOTES
".\"A [employer] message has been sent to the patient for PATIENT ROUNDING with [facility]\"  "

## 2024-04-28 LAB
FUNGUS WND CULT: ABNORMAL
FUNGUS WND CULT: ABNORMAL

## 2024-04-29 ENCOUNTER — TELEPHONE (OUTPATIENT)
Dept: INTERNAL MEDICINE | Facility: CLINIC | Age: 79
End: 2024-04-29
Payer: MEDICARE

## 2024-04-29 RX ORDER — ATORVASTATIN CALCIUM 40 MG/1
40 TABLET, FILM COATED ORAL DAILY
Qty: 90 TABLET | Refills: 1 | Status: SHIPPED | OUTPATIENT
Start: 2024-04-29

## 2024-04-29 RX ORDER — CARVEDILOL 6.25 MG/1
6.25 TABLET ORAL DAILY
Qty: 30 TABLET | Refills: 0 | Status: SHIPPED | OUTPATIENT
Start: 2024-04-29 | End: 2024-05-29

## 2024-04-29 NOTE — TELEPHONE ENCOUNTER
Caller: Jose Wang    Relationship to patient: Emergency Contact    Best call back number: [phone]    Patient is needing: PATIENT'S DAUGHTER STATED LIPITOR, SERTALINE AND CARVEDILOL WAS NOT SENT TO THE [facility] FROM MD SORTO ON HIS VISIT 04.24.2024

## 2024-04-29 NOTE — TELEPHONE ENCOUNTER
I've re-sent the atorvastatin and coreg.  Can you confirm the dosage of sertraline he's been taking?  I see both 50 mg and 100 mg in the past.

## 2024-04-29 NOTE — TELEPHONE ENCOUNTER
It looks like the Zoloft was D/C on 3/9/24 by Dr. Vargas at discharge. He was on 50mg of the Zoloft before being d/c.

## 2024-05-01 ENCOUNTER — OFFICE VISIT (OUTPATIENT)
Dept: PODIATRY | Facility: CLINIC | Age: 79
End: 2024-05-01
Payer: MEDICARE

## 2024-05-01 VITALS
HEART RATE: 69 BPM | DIASTOLIC BLOOD PRESSURE: 82 MMHG | SYSTOLIC BLOOD PRESSURE: 169 MMHG | OXYGEN SATURATION: 91 % | BODY MASS INDEX: 25.37 KG/M2 | TEMPERATURE: 97.5 F | WEIGHT: 162 LBS

## 2024-05-01 DIAGNOSIS — L60.0 ONYCHOCRYPTOSIS: ICD-10-CM

## 2024-05-01 DIAGNOSIS — M79.671 FOOT PAIN, BILATERAL: Primary | ICD-10-CM

## 2024-05-01 DIAGNOSIS — M79.671 FOOT PAIN, RIGHT: ICD-10-CM

## 2024-05-01 DIAGNOSIS — M72.2 PLANTAR FASCIITIS: ICD-10-CM

## 2024-05-01 DIAGNOSIS — E11.8 DIABETIC FOOT: ICD-10-CM

## 2024-05-01 DIAGNOSIS — M79.672 FOOT PAIN, BILATERAL: Primary | ICD-10-CM

## 2024-05-01 DIAGNOSIS — Z99.2 DIALYSIS PATIENT: ICD-10-CM

## 2024-05-01 DIAGNOSIS — B35.1 ONYCHOMYCOSIS: ICD-10-CM

## 2024-05-01 DIAGNOSIS — E11.9 NON-INSULIN DEPENDENT TYPE 2 DIABETES MELLITUS: ICD-10-CM

## 2024-05-01 RX ORDER — TRIAMCINOLONE ACETONIDE 40 MG/ML
20 INJECTION, SUSPENSION INTRA-ARTICULAR; INTRAMUSCULAR ONCE
Status: COMPLETED | OUTPATIENT
Start: 2024-05-01 | End: 2024-05-01

## 2024-05-01 RX ORDER — SERTRALINE HYDROCHLORIDE 100 MG/1
100 TABLET, FILM COATED ORAL NIGHTLY
Qty: 90 TABLET | Refills: 2 | Status: SHIPPED | OUTPATIENT
Start: 2024-05-01

## 2024-05-01 RX ORDER — TESTOSTERONE CYPIONATE 200 MG/ML
4 INJECTION INTRAMUSCULAR ONCE
Status: COMPLETED | OUTPATIENT
Start: 2024-05-01 | End: 2024-05-01

## 2024-05-01 RX ORDER — BUPIVACAINE HYDROCHLORIDE 5 MG/ML
0.5 INJECTION, SOLUTION PERINEURAL ONCE
Status: COMPLETED | OUTPATIENT
Start: 2024-05-01 | End: 2024-05-01

## 2024-05-01 RX ADMIN — BUPIVACAINE HYDROCHLORIDE 0.5 ML: 5 INJECTION, SOLUTION PERINEURAL at 09:00

## 2024-05-01 RX ADMIN — TESTOSTERONE CYPIONATE 4 MG: 200 INJECTION INTRAMUSCULAR at 09:00

## 2024-05-01 RX ADMIN — TRIAMCINOLONE ACETONIDE 20 MG: 40 INJECTION, SUSPENSION INTRA-ARTICULAR; INTRAMUSCULAR at 09:00

## 2024-05-01 NOTE — PROGRESS NOTES
[facility] P[facility]ATRY    Today's Date: 05/01/24    Patient Name: Nelson Wang  MRN: [id number]  CSN: [id number]  PCP: Domingo Bravo MD, Last PCP Visit: 24 April 2024  Referring Provider: No ref. provider found    SUBJECTIVE     Chief Complaint   Patient presents with    Left Foot - Follow-up, Pain, Nail Problem    Right Foot - Follow-up, Pain, Nail Problem    Right Ankle - Follow-up, Pain     HPI: Nelson Wang, a 79 y.o.male, presents to clinic for painful toenail:    New, Established, New Problem: est  Location:  Toenails  Duration:   Greater than five years  Onset:  Gradual  Nature:  sore with palpation.  Stable, worsening, improving:   Recurring  Aggravating factors:  Pain with shoe gear and ambulation.  Previous Treatment: Unable to trim their own toenails.    Patient controlling diabetes via: Insulin injections    New, Established, New Problem: New problem  Location: Right plantar arch  Duration: Recent weeks  Onset: Insidious  Nature: Sore painful arch  Aggravating factors:  Patient relates pain is aggravated by shoe gear and ambulation.    Previous Treatment: None    Medical changes: Started hemodialysis    Patient denies any fevers, chills, nausea, vomiting, shortness of breath, nor any other constitutional signs nor symptoms.    Past Medical History:   Diagnosis Date    Anemia     Ankle pain, right     Anxiety and depression     CKD (chronic kidney disease), stage IV     Diabetes     Gout     High cholesterol     HL (hearing loss)     Hyperkalemia     Hypertension     Hypothyroidism     Insomnia     Night terrors     Psoriasis     Sleep walking     Vitiligo      Past Surgical History:   Procedure Laterality Date    ANKLE SURGERY  11/1990    APPENDECTOMY N/A 1954    BRONCHOSCOPY N/A 3/1/2024    Procedure: BRONCHOSCOPY WITH BAL AND WASHINGS;  Surgeon: Sandra Espinal MD;  Location: [facility] ENDOSCOPY;  Service: Pulmonary;  Laterality: N/A;  PNEUMONIA    COLONOSCOPY N/A 06/2022    [facility] [place]    HIP BIPOLAR REPLACEMENT Right 2000    INSERTION HEMODIALYSIS CATHETER Left 2024    Procedure: HEMODIALYSIS CATHETER INSERTION;  Surgeon: Jose Berry MD;  Location: [facility] MAIN OR;  Service: General;  Laterality: Left;    INSERTION HEMODIALYSIS CATHETER N/A 2024    Procedure: Tunneled hemodialysis catheter insertion;  Surgeon: Enrique Vinson MD;  Location: [facility] MAIN OR;  Service: Vascular;  Laterality: N/A;    INSERTION PERITONEAL DIALYSIS CATHETER N/A 3/27/2023    Procedure: LAPAROSCOPIC INSERTION PERITONEAL DIALYSIS CATHETER, LAPAROSCOPIC OMENTOPEXY WITH LYSIS OF ADHESIONS;  Surgeon: Jose Berry MD;  Location: [facility] MAIN OR;  Service: General;  Laterality: N/A;    INSERTION PERITONEAL DIALYSIS CATHETER Left 2023    Procedure: REVISION OF PERITONEAL DIALYSIS CATHETER;  Surgeon: Radha Oreilly MD;  Location: [facility] MAIN OR;  Service: General;  Laterality: Left;    REMOVAL PERITONEAL DIALYSIS CATHETER N/A 2024    Procedure: REMOVAL PERITONEAL DIALYSIS CATHETER;  Surgeon: Jose Berry MD;  Location: [facility] MAIN OR;  Service: General;  Laterality: N/A;    RENAL BIOPSY Left 07/15/2022     Family History   Problem Relation Age of Onset    Diabetes Mother     Heart disease Father     Cancer Sister 35    Diabetes Sister     Diabetes Brother     Sleep apnea Paternal Aunt     Heart disease Paternal Uncle     Sleep apnea Daughter     Malig Hyperthermia Neg Hx      Social History     Socioeconomic History    Marital status:    Tobacco Use    Smoking status: Former     Current packs/day: 0.00     Average packs/day: 1 pack/day for 10.0 years (10.0 ttl pk-yrs)     Types: Cigarettes     Start date:      Quit date: 2000     Years since quittin.3     Passive exposure: Past    Smokeless tobacco: Never    Tobacco comments:     quit 25 years ago, chews nicotine gum   Vaping Use    Vaping status: Never Used   Substance and Sexual Activity     Alcohol use: Yes     Comment: 1 DRINK/DAY-rarely    Drug use: Never    Sexual activity: Defer     No Known Allergies  Current Outpatient Medications   Medication Sig Dispense Refill    allopurinol (ZYLOPRIM) 100 MG tablet Take 1 tablet by mouth Daily.      ARIPiprazole (ABILIFY) 2 MG tablet Take 1 tablet by mouth 2 (Two) Times a Day.      aspirin 81 MG chewable tablet Chew 1 tablet Daily.      atorvastatin (LIPITOR) 40 MG tablet Take 1 tablet by mouth Daily. 90 tablet 1    Beclomethasone Diprop HFA (QVAR Redihaler) 40 MCG/ACT inhaler Inhale 2 puffs 2 (Two) Times a Day. 10.6 g 3    carvedilol (COREG) 6.25 MG tablet Take 1 tablet by mouth Daily for 30 days. 30 tablet 0    famotidine (Pepcid) 20 MG tablet Take 1 tablet by mouth.      fluconazole (DIFLUCAN) 200 MG tablet Take 1 tablet by mouth 3 (Three) Times a Week. Take AFTER dialysis on Tuesday, Thursday, and Saturdays. Indications: Infection Within the Abdomen 7 tablet 0    Insulin Glargine (LANTUS SOLOSTAR) 100 UNIT/ML injection pen Inject 10 Units under the skin into the appropriate area as directed Every Night.      ipratropium-albuterol (DUO-NEB) 0.5-2.5 mg/3 ml nebulizer Take 3 mL by nebulization Every 4 (Four) Hours As Needed for Wheezing or Shortness of Air. Indications: Chronic Obstructive Lung Disease, J44.9 360 mL 0    levothyroxine (SYNTHROID, LEVOTHROID) 150 MCG tablet Take 1 tablet by mouth Every Morning for 30 days. 30 tablet 0    Methoxy PEG-Epoetin Beta (MIRCERA IJ) 30 mcg Every 14 (Fourteen) Days.      Risankizumab-rzaa (Skyrizi) 150 MG/ML solution prefilled syringe Inject 150 mg under the skin into the appropriate area as directed Every 3 (Three) Months.      saccharomyces boulardii (FLORASTOR) 250 MG capsule Take 1 capsule by mouth 2 (Two) Times a Day for 30 days. 60 capsule 0    sevelamer (RENVELA) 800 MG tablet Take 1 tablet by mouth 3 (Three) Times a Day With Meals for 30 days. 90 tablet 0    torsemide (DEMADEX) 100 MG tablet Take 1 tablet by  mouth Daily. 90 tablet 2    benzonatate (TESSALON) 100 MG capsule Take 1 capsule by mouth 3 (Three) Times a Day. (Patient not taking: Reported on 4/24/2024)      Lidocaine 5 % cream Apply  topically to the appropriate area as directed 3 (Three) Times a Day. (Patient not taking: Reported on 4/24/2024)      LORazepam (ATIVAN) 0.5 MG tablet Take 1 tablet by mouth. (Patient not taking: Reported on 4/24/2024)      nystatin (MYCOSTATIN) 100,000 unit/mL suspension Take 5 mL by mouth 3 (Three) Times a Day. (Patient not taking: Reported on 4/24/2024)       Current Facility-Administered Medications   Medication Dose Route Frequency Provider Last Rate Last Admin    bupivacaine (MARCAINE) 0.5 % injection 0.5 mL  0.5 mL Injection Once Armando Guallpa DPM        dexAMETHasone sodium phosphate injection 4 mg  4 mg Intra-articular Once Armando Guallpa DPM        triamcinolone acetonide (KENALOG-40) injection 20 mg  20 mg Intramuscular Once Armando Guallpa DPM         Review of Systems   Constitutional: Negative.    Musculoskeletal:         Patient relates new arch pain on right foot.   Skin:         Painful toenails.   All other systems reviewed and are negative.      OBJECTIVE     Vitals:    05/01/24 0847   BP: 169/82   Pulse: 69   Temp: 97.5 °F (36.4 °C)   SpO2: 91%       Body mass index is 25.37 kg/m².    Lab Results   Component Value Date    HGBA1C 6.60 (H) 04/10/2024       Lab Results   Component Value Date    GLUCOSE 103 (H) 04/14/2024    CALCIUM 9.2 04/14/2024     04/14/2024    K 4.4 04/14/2024    CO2 23.7 04/14/2024     04/14/2024    BUN 33 (H) 04/18/2024    CREATININE 4.73 (H) 04/14/2024    EGFRIFAFRI 27 (L) 03/24/2022    EGFRIFNONA 28 (L) 02/23/2022    BCR 6.3 (L) 04/14/2024    ANIONGAP 9.3 04/14/2024       Patient seen in no apparent distress.      PHYSICAL EXAM:     Foot/Ankle Exam    GENERAL  Diabetic foot exam performed    Appearance:  elderly  Orientation:  AAOx3  Affect:   appropriate  Gait:  unimpaired  Assistance:  independent  Right shoe gear: casual shoe  Left shoe gear: casual shoe    VASCULAR     Right Foot Vascularity   Dorsalis pedis:  2+  Posterior tibial:  2+  Skin temperature:  warm  Edema grading:  None  CFT:  < 3 seconds  Pedal hair growth:  Absent  Varicosities:  mild varicosities     Left Foot Vascularity   Dorsalis pedis:  2+  Posterior tibial:  2+  Skin temperature:  warm  Edema grading:  None  CFT:  < 3 seconds  Pedal hair growth:  Absent  Varicosities:  mild varicosities     NEUROLOGIC     Right Foot Neurologic   Normal sensation    Light touch sensation: normal  Vibratory sensation: normal  Hot/Cold sensation: normal  Protective Sensation using [place] Monofilament:   Sites intact: 10  Sites tested: 10     Left Foot Neurologic   Normal sensation    Light touch sensation: normal  Vibratory sensation: normal  Hot/Cold sensation:  normal  Protective Sensation using [place] Monofilament:   Sites intact: 10  Sites tested: 10    MUSCULOSKELETAL     Right Foot Musculoskeletal   Tenderness:  plantar fascia tenderness   (Right ankle joint)    MUSCLE STRENGTH     Right Foot Muscle Strength   Foot dorsiflexion:  4-  Foot plantar flexion:  4-  Foot inversion:  4-  Foot eversion:  4-     Left Foot Muscle Strength   Foot dorsiflexion:  4-  Foot plantar flexion:  4-  Foot inversion:  4-  Foot eversion:  4-    RANGE OF MOTION     Right Foot Range of Motion   Foot and ankle ROM within normal limits    Ankle dorsiflexion: decreased  with pain  Ankle plantar flexion: decreased  with pain  Foot eversion:  (Crepitus with range of motion of subtalar joint.)     Left Foot Range of Motion   Foot and ankle ROM within normal limits      DERMATOLOGIC      Right Foot Dermatologic   Skin  Right foot skin is intact.   Nails  1.  Positive for elongated, onychomycosis, abnormal thickness, subungual debris and ingrown toenail.  2.  Positive for elongated, onychomycosis, abnormal  thickness, subungual debris and ingrown toenail.  3.  Positive for elongated, onychomycosis, abnormal thickness, subungual debris and ingrown toenail.  4.  Positive for elongated, onychomycosis, abnormal thickness, subungual debris and ingrown toenail.  5.  Positive for elongated, onychomycosis, abnormal thickness, subungual debris and ingrown toenail.     Left Foot Dermatologic   Skin  Left foot skin is intact.   Nails  1.  Positive for elongated, onychomycosis, abnormal thickness, subungual debris and ingrown toenail.  2.  Positive for elongated, onychomycosis, abnormal thickness, subungual debris and ingrown toenail.  3.  Positive for elongated, onychomycosis, abnormal thickness, subungual debris and ingrown toenail.  4.  Positive for elongated, onychomycosis, abnormally thick, subungual debris and ingrown toenail.  5.  Positive for elongated, onychomycosis, abnormally thick, subungual debris and ingrown toenail.    Diabetic Foot Exam Performed and Monofilament Test Performed    ASSESSMENT/PLAN     Diagnoses and all orders for this visit:    1. Foot pain, bilateral (Primary)    2. Onychomycosis    3. Diabetic foot    4. Onychocryptosis    5. Non-insulin dependent type 2 diabetes mellitus    6. Foot pain, right    7. Plantar fasciitis  -     dexAMETHasone sodium phosphate injection 4 mg  -     bupivacaine (MARCAINE) 0.5 % injection 0.5 mL  -     triamcinolone acetonide (KENALOG-40) injection 20 mg    8. Dialysis patient        Comprehensive lower extremity examination and evaluation was performed.    Discussed findings and treatment plan including risks, benefits, and treatment options with patient in detail. Patient agreed with treatment plan.    Medications and allergies reviewed.  Reviewed available blood glucose and HgB A1C lab values along with other pertinent labs.  These were discussed with the patient as to their importance of diabetic maintenance.    Toenails 1, 2, 3, 4, 5 on Right and 1, 2, 3, 4, 5 on  Left were debrided with nail nippers then filed with a [place] nail di.  Patient tolerated procedure well without complications.    Plantar Fasciitis Injection  Date/Time: 05/01/2024  Performed by: Armando Guallpa DPM  Authorized by: Armando Guallpa DPM     Consent: Verbal consent obtained. Written consent obtained.  Risks and benefits: risks, benefits and alternatives were discussed  Consent given by: patient  Patient identity confirmed: verbally with patient  Indications: pain relief    Injection site: Right heel.    Sedation:  Patient sedated: no    Patient position: sitting  Needle size: 27 G  Local anesthetic: 0.5 ml 0.5% Marcaine plain, 0.5 ml Kenalog 40 mg/ml, and 0.5 ml Decadron 4 mg/mL.   Outcome: pain improved  Patient tolerance: Patient tolerated the procedure well with no immediate complications    Patient may begin to weight bear as tolerated in supportive shoes.  No impact activities for two weeks.  After that time, the patient may increase activities as tolerated. Patient states understanding and agreement with this plan.    An After Visit Summary was printed and given to the patient at discharge, including (if requested) any available informative/educational handouts regarding diagnosis, treatment, or medications. All questions were answered to patient/family satisfaction. Should symptoms fail to improve or worsen they agree to call or return to clinic or to go to the Emergency Department. Discussed the importance of following up with any needed screening tests/labs/specialist appointments and any requested follow-up recommended by me today. Importance of maintaining follow-up discussed and patient accepts that missed appointments can delay diagnosis and potentially lead to worsening of conditions.    Return in about 9 weeks (around 7/3/2024) for Toenail Care., or sooner if acute issues arise.    This document has been electronically signed by Armando Guallpa DPM on May 1, 2024 09:21  EDT

## 2024-05-02 NOTE — PROGRESS NOTES
Primary Care Provider  Domingo Bravo MD   Referring Provider  No ref. provider found      Patient Complaint  Follow-up, Cough (Productive Clear phlegm/Worse at night ), and Shortness of Breath (From pneumonia)      Subjective          Nelson Wang presents to [facility] PULMONARY [facility] MEDICINE      History of Presenting Illness  Nelson Wang is a 79 y.o. male with orthopnea, recent multifocal pneumonia, ESRD on hemodialysis, and tobacco use in remission, here for 6-week follow-up.    Patient was hospitalized at [facility] in April 2024 for sepsis due to peritonitis associated with PD catheter.  He has since transitioned to outpatient hemodialysis Tuesday/Thursday/Saturday, has a TDC.  He also recently finished fluconazole for Candida infection seen on peritoneal fluid culture.  Patient states he is feeling better overall since being discharged from the hospital, although he does still have some shortness of air, particularly when laying flat.  He also has a cough productive of clear sputum that has started within the past week or two.  Patient states his shortness of breath is also dependent on his hemoglobin levels, can tell when they are getting lower as his shortness of breath will worsen.  He has chronic anemia due to kidney disease; most recent hemoglobin 8.3 on 4/25/2024.  Patient denies using any antibiotics or steroids for his lungs recently, denies any fevers or chills.  He denies any swelling, weight is stable.  He does not use any maintenance inhalers but does use his DuoNeb treatments as needed, usually twice a day.  He is interested in trying a maintenance inhaler.  He is a former smoker, quit 24 years ago, 10 pack years.  Patient denies any hemoptysis, swollen lymph nodes, unintentional weight loss, or night sweats.  Overall, patient is doing well and has no additional concerns at this time.  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   Patient is able to perform ADLs without difficulty.  I have personally reviewed the review of systems, past family, social, medical and surgical histories; and agree with their findings.      Review of Systems    Review of Systems   Constitutional:  Negative for activity change, chills, fatigue, fever, unexpected weight gain and unexpected weight loss.   HENT:  Negative for congestion, ear discharge, ear pain, mouth sores, postnasal drip, rhinorrhea, sinus pressure, sore throat, swollen glands and trouble swallowing.    Eyes:  Negative for blurred vision, pain, discharge, itching and visual disturbance.   Respiratory:  Positive for cough (productive of clear sputum) and shortness of breath (orthopnea; also dyspnea with exertion). Negative for apnea, chest tightness, wheezing and stridor.    Cardiovascular:  Negative for chest pain, palpitations and leg swelling.   Gastrointestinal:  Negative for abdominal distention, abdominal pain, constipation, diarrhea, nausea, vomiting, GERD and indigestion.   Musculoskeletal:  Negative for arthralgias, joint swelling and myalgias.   Skin:  Negative for color change.   Neurological:  Negative for dizziness, weakness, light-headedness and headache.      Sleep: Negative for Excessive daytime sleepiness  Negative for morning headaches  Negative for Snoring      Family History   Problem Relation Age of Onset    Diabetes Mother     Heart disease Father     Cancer Sister 35    Diabetes Sister     Diabetes Brother     Sleep apnea Paternal Aunt     Heart disease Paternal Uncle     Sleep apnea Daughter     Malig Hyperthermia Neg Hx         Social History     Socioeconomic History    Marital status:    Tobacco Use    Smoking status: Former     Current packs/day: 0.00     Average packs/day: 1 pack/day for 10.0 years (10.0 ttl pk-yrs)     Types: Cigarettes     Start date:      Quit date: 2000     Years since quittin.3     Passive exposure: Past    Smokeless tobacco: Never     Tobacco comments:     quit 25 years ago, chews nicotine gum in past   Vaping Use    Vaping status: Never Used   Substance and Sexual Activity    Alcohol use: Yes     Comment: 1 DRINK/DAY-rarely    Drug use: Never    Sexual activity: Defer        Past Medical History:   Diagnosis Date    Anemia     Ankle pain, right     Anxiety and depression     CKD (chronic kidney disease), stage IV     Diabetes     Gout     High cholesterol     HL (hearing loss)     Hyperkalemia     Hypertension     Hypothyroidism     Insomnia     Night terrors     Psoriasis     Sleep walking     Vitiligo         Immunization History   Administered Date(s) Administered    Arexvy (RSV, Adults 60+ yrs) 12/29/2023    COVID-19 (PFIZER) BIVALENT 12+YRS 09/19/2022, 08/21/2023    COVID-19 (PFIZER) Purple Cap Monovalent 08/22/2021, 10/04/2021, 09/30/2022    COVID-19 F23 (PFIZER) 12YRS+ (COMIRNATY) 12/06/2023    Fluad Quad 65+ 08/21/2023    Fluzone High-Dose 65+yrs 08/22/2021    Hepatitis B 2 Dose Vaccine Heplisav-B 04/18/2023, 05/16/2023, 07/07/2023, 08/02/2023    Influenza, Unspecified 08/22/2021, 09/30/2022       No Known Allergies       Current Outpatient Medications:     allopurinol (ZYLOPRIM) 100 MG tablet, Take 1 tablet by mouth Daily. As needed, Disp: , Rfl:     ARIPiprazole (ABILIFY) 2 MG tablet, Take 1 tablet by mouth 2 (Two) Times a Day., Disp: , Rfl:     aspirin 81 MG chewable tablet, Chew 1 tablet Daily., Disp: , Rfl:     atorvastatin (LIPITOR) 40 MG tablet, Take 1 tablet by mouth Daily., Disp: 90 tablet, Rfl: 1    carvedilol (COREG) 6.25 MG tablet, Take 1 tablet by mouth Daily for 30 days., Disp: 30 tablet, Rfl: 0    fluconazole (DIFLUCAN) 200 MG tablet, Take 1 tablet by mouth 3 (Three) Times a Week. Take AFTER dialysis on Tuesday, Thursday, and Saturdays. Indications: Infection Within the Abdomen, Disp: 7 tablet, Rfl: 0    Insulin Glargine (LANTUS SOLOSTAR) 100 UNIT/ML injection pen, Inject 10 Units under the skin into the appropriate area  as directed Every Night., Disp: , Rfl:     ipratropium-albuterol (DUO-NEB) 0.5-2.5 mg/3 ml nebulizer, Take 3 mL by nebulization Every 4 (Four) Hours As Needed for Wheezing or Shortness of Air. Indications: Chronic Obstructive Lung Disease, J44.9, Disp: 360 mL, Rfl: 0    levothyroxine (SYNTHROID, LEVOTHROID) 150 MCG tablet, Take 1 tablet by mouth Every Morning for 30 days., Disp: 30 tablet, Rfl: 0    Methoxy PEG-Epoetin Beta (MIRCERA IJ), 30 mcg Every 14 (Fourteen) Days., Disp: , Rfl:     Risankizumab-rzaa (Skyrizi) 150 MG/ML solution prefilled syringe, Inject 150 mg under the skin into the appropriate area as directed Every 3 (Three) Months., Disp: , Rfl:     saccharomyces boulardii (FLORASTOR) 250 MG capsule, Take 1 capsule by mouth 2 (Two) Times a Day for 30 days., Disp: 60 capsule, Rfl: 0    sertraline (ZOLOFT) 100 MG tablet, Take 1 tablet by mouth Every Night., Disp: 90 tablet, Rfl: 2    sevelamer (RENVELA) 800 MG tablet, Take 1 tablet by mouth 3 (Three) Times a Day With Meals for 30 days., Disp: 90 tablet, Rfl: 0    torsemide (DEMADEX) 100 MG tablet, Take 1 tablet by mouth Daily., Disp: 90 tablet, Rfl: 2    benzonatate (TESSALON) 100 MG capsule, Take 1 capsule by mouth 3 (Three) Times a Day. (Patient not taking: Reported on 4/24/2024), Disp: , Rfl:     Budeson-Glycopyrrol-Formoterol (BREZTRI) 160-9-4.8 MCG/ACT aerosol inhaler, Inhale 2 puffs 2 (Two) Times a Day., Disp: 1 each, Rfl: 5    colchicine 0.6 MG tablet, Take 1 tablet by mouth Every 12 (Twelve) Hours. (Patient not taking: Reported on 5/3/2024), Disp: , Rfl:     famotidine (Pepcid) 20 MG tablet, Take 1 tablet by mouth. (Patient not taking: Reported on 5/3/2024), Disp: , Rfl:     Lidocaine 5 % cream, Apply  topically to the appropriate area as directed 3 (Three) Times a Day. (Patient not taking: Reported on 4/24/2024), Disp: , Rfl:     LORazepam (ATIVAN) 0.5 MG tablet, Take 1 tablet by mouth. (Patient not taking: Reported on 4/24/2024), Disp: , Rfl:      "nystatin (MYCOSTATIN) 100,000 unit/mL suspension, Take 5 mL by mouth 3 (Three) Times a Day. (Patient not taking: Reported on 4/24/2024), Disp: , Rfl:     predniSONE (DELTASONE) 10 MG tablet, Take 1 tablet by mouth Daily. (Patient not taking: Reported on 5/3/2024), Disp: , Rfl:      Objective     Vital Signs:   /57 (BP Location: Left arm, Patient Position: Sitting, Cuff Size: Adult)   Pulse 64   Temp 98.1 °F (36.7 °C) (Oral)   Resp 16   Ht 170.2 cm (67\")   Wt 74.6 kg (164 lb 6.4 oz)   SpO2 97% Comment: room air  BMI 25.75 kg/m²     Physical Exam  Constitutional:       General: He is not in acute distress.     Appearance: Normal appearance. He is normal weight. He is not ill-appearing.   HENT:      Right Ear: Tympanic membrane and ear canal normal.      Left Ear: Tympanic membrane and ear canal normal.      Nose: Nose normal.      Mouth/Throat:      Mouth: Mucous membranes are moist.      Pharynx: Oropharynx is clear.   Eyes:      Extraocular Movements: Extraocular movements intact.      Conjunctiva/sclera: Conjunctivae normal.      Pupils: Pupils are equal, round, and reactive to light.   Cardiovascular:      Rate and Rhythm: Normal rate and regular rhythm.      Pulses: Normal pulses.      Heart sounds: Normal heart sounds.   Pulmonary:      Effort: Pulmonary effort is normal. No respiratory distress.      Breath sounds: Normal breath sounds. No stridor. No wheezing, rhonchi or rales.   Abdominal:      General: Bowel sounds are normal.      Palpations: Abdomen is soft.   Musculoskeletal:         General: No swelling. Normal range of motion.      Cervical back: Normal range of motion and neck supple.      Right lower leg: No edema.      Left lower leg: No edema.   Skin:     General: Skin is warm and dry.   Neurological:      General: No focal deficit present.      Mental Status: He is alert and oriented to person, place, and time.      Motor: No weakness.   Psychiatric:         Mood and Affect: Mood " normal.         Behavior: Behavior normal.       Result Review :   I have personally reviewed patient's labs and images.  I also reviewed Josephine's last office note 3/12/2024 and Dr. Santacruz's hospital discharge summary 4/14/2024.            Diagnoses and all orders for this visit:    1. Multifocal pneumonia (Primary)    2. Pleural effusion    3. Orthopnea  -     Budeson-Glycopyrrol-Formoterol (BREZTRI) 160-9-4.8 MCG/ACT aerosol inhaler; Inhale 2 puffs 2 (Two) Times a Day.  Dispense: 1 each; Refill: 5  -     Complete PFT - Pre & Post Bronchodilator; Future    4. Dyspnea on exertion  -     Budeson-Glycopyrrol-Formoterol (BREZTRI) 160-9-4.8 MCG/ACT aerosol inhaler; Inhale 2 puffs 2 (Two) Times a Day.  Dispense: 1 each; Refill: 5  -     Complete PFT - Pre & Post Bronchodilator; Future    5. Mucus plugging of bronchi    6. Recurrent aspiration pneumonia    7. End stage renal disease    8. Anemia due to stage 5 chronic kidney disease, not on chronic dialysis    9. Grade I diastolic dysfunction    10. Tobacco abuse, in remission    Other orders  -     Cancel: Complete PFT - Pre & Post Bronchodilator; Future       Impression and Plan    -ABGs 3/4/2024 pH 7.322 / pCO2 50.2 / pO2 259.6  -CT chest 2/18/2024 showed slight improvement in right lower lobe airspace consolidation, areas of groundglass opacity in the right mid and lower lung fields were stable.  Patient has repeat chest CT scheduled for 5/24/2024.  -CXR 4/12/2024 showed minimal atelectasis or infiltrate in the left lower lobe, pulmonary vascular congestion  -PFTs ordered today  -Begin using Breztri inhaler 2 puffs twice a day, instructed patient to rinse mouth after each use, samples and spacer provided in clinic today.  Prescription also sent to patient's pharmacy.  -Continue using DuoNeb treatments as needed, new mouthpiece and tubing given to patient for his nebulizer machine  -Begin taking Zyrtec 10 mg once a day for possible allergies, patient reports  having this medication at home  -Continue following up with nephrology for management of ESRD on hemodialysis  -Patient to keep regularly scheduled appointment with Dr. Espinal 6/5/2024 after chest CT    Smoking status: Reviewed  Vaccination status: Patient reports he is up-to-date with his flu, pneumonia, and Covid vaccines.  Patient is advised to continue to follow CDC recommendations such as social distancing wearing a mask and washing hands for at least 20 seconds.  Medications personally reviewed    Follow Up   No follow-ups on file.  Patient was given instructions and counseling regarding his condition or for health maintenance advice. Please see specific information pulled into the AVS if appropriate.

## 2024-05-03 ENCOUNTER — OFFICE VISIT (OUTPATIENT)
Dept: SLEEP MEDICINE | Facility: HOSPITAL | Age: 79
End: 2024-05-03
Payer: MEDICARE

## 2024-05-03 ENCOUNTER — OFFICE VISIT (OUTPATIENT)
Dept: PULMONOLOGY | Facility: CLINIC | Age: 79
End: 2024-05-03
Payer: MEDICARE

## 2024-05-03 VITALS
DIASTOLIC BLOOD PRESSURE: 57 MMHG | HEART RATE: 64 BPM | HEIGHT: 67 IN | OXYGEN SATURATION: 97 % | SYSTOLIC BLOOD PRESSURE: 152 MMHG | TEMPERATURE: 98.1 F | BODY MASS INDEX: 25.8 KG/M2 | WEIGHT: 164.4 LBS | RESPIRATION RATE: 16 BRPM

## 2024-05-03 VITALS
BODY MASS INDEX: 25.25 KG/M2 | WEIGHT: 160.9 LBS | SYSTOLIC BLOOD PRESSURE: 189 MMHG | HEART RATE: 70 BPM | OXYGEN SATURATION: 94 % | HEIGHT: 67 IN | DIASTOLIC BLOOD PRESSURE: 80 MMHG

## 2024-05-03 DIAGNOSIS — E66.3 OVERWEIGHT (BMI 25.0-29.9): ICD-10-CM

## 2024-05-03 DIAGNOSIS — R06.09 DYSPNEA ON EXERTION: ICD-10-CM

## 2024-05-03 DIAGNOSIS — J90 PLEURAL EFFUSION: ICD-10-CM

## 2024-05-03 DIAGNOSIS — R06.81 WITNESSED EPISODE OF APNEA: ICD-10-CM

## 2024-05-03 DIAGNOSIS — N18.6 END STAGE RENAL DISEASE: ICD-10-CM

## 2024-05-03 DIAGNOSIS — R06.83 SNORING: ICD-10-CM

## 2024-05-03 DIAGNOSIS — I51.89 GRADE I DIASTOLIC DYSFUNCTION: ICD-10-CM

## 2024-05-03 DIAGNOSIS — J18.9 MULTIFOCAL PNEUMONIA: Primary | ICD-10-CM

## 2024-05-03 DIAGNOSIS — Z72.821 INADEQUATE SLEEP HYGIENE: ICD-10-CM

## 2024-05-03 DIAGNOSIS — R29.818 SUSPECTED SLEEP APNEA: Primary | ICD-10-CM

## 2024-05-03 DIAGNOSIS — F17.201 TOBACCO ABUSE, IN REMISSION: ICD-10-CM

## 2024-05-03 DIAGNOSIS — J69.0 RECURRENT ASPIRATION PNEUMONIA: ICD-10-CM

## 2024-05-03 DIAGNOSIS — R06.01 ORTHOPNEA: ICD-10-CM

## 2024-05-03 DIAGNOSIS — T17.500A MUCUS PLUGGING OF BRONCHI: ICD-10-CM

## 2024-05-03 DIAGNOSIS — N18.5 ANEMIA DUE TO STAGE 5 CHRONIC KIDNEY DISEASE, NOT ON CHRONIC DIALYSIS: ICD-10-CM

## 2024-05-03 DIAGNOSIS — G47.19 EXCESSIVE DAYTIME SLEEPINESS: ICD-10-CM

## 2024-05-03 DIAGNOSIS — D63.1 ANEMIA DUE TO STAGE 5 CHRONIC KIDNEY DISEASE, NOT ON CHRONIC DIALYSIS: ICD-10-CM

## 2024-05-03 DIAGNOSIS — I10 ESSENTIAL HYPERTENSION: ICD-10-CM

## 2024-05-03 PROCEDURE — G0463 HOSPITAL OUTPT CLINIC VISIT: HCPCS

## 2024-05-03 RX ORDER — PREDNISONE 10 MG/1
1 TABLET ORAL DAILY
COMMUNITY
Start: 2024-04-25

## 2024-05-03 RX ORDER — BUDESONIDE, GLYCOPYRROLATE, AND FORMOTEROL FUMARATE 160; 9; 4.8 UG/1; UG/1; UG/1
2 AEROSOL, METERED RESPIRATORY (INHALATION) 2 TIMES DAILY
Qty: 4 EACH | Refills: 0 | COMMUNITY
Start: 2024-05-03 | End: 2024-05-04

## 2024-05-03 RX ORDER — COLCHICINE 0.6 MG/1
1 TABLET ORAL EVERY 12 HOURS SCHEDULED
COMMUNITY
Start: 2024-04-25

## 2024-05-03 NOTE — PROGRESS NOTES
[facility]  [address] [place] [address]  [place]   [place] [address]  Phone: [phone]  Fax: [phone]      Nelson Wang  [id number]   1945  79 y.o.  male      PCP Domingo Bravo MD    Type of service: Initial Sleep Medicine Consult.  Date of service: 5/3/2024      Chief Complaint   Patient presents with    Snoring    Witnessed Apnea     Difficulty sleeping overnight in sleep lab       History of present illness;  The patient was seen today on 5/3/2024 at [facility].    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     -Overnight polysomnography was non-diagnostic only ~79 minutes of sleep, patient pulling at polysomnography diagnostics, aborted study: Patient states he felt very trapped by all of the polysomnography wires and  admits to adjusting wires to feel more comfortable while awake, states he had difficult time tolerating test requested to abort study, and believes he will be able to tolerate home sleep study better.    -Persists with loud snoring endorsed by his wife  No injuries to self or others during sleep  No ambulation out of bed  No sleep walking  No sleep eating   No hypnagogic/hypnopompic hallucinations  No sleep paralysis  No cataplexy  He is recovering well from remote episode of pneumonia   Never on home O2  30 year ~1/2 ppd cigarette smoker quit 30 years ag    Obstructive Sleep Apnea Screening: STOP-BANG Sleep Apnea Questionnaire. Reference: Mike F et al. Br J Anaesth, 2012.     Criterion    Yes    No  Do you SNORE loudly?   [x]   Yes  []   No   Do you often feel TIRED, fatigued, or sleepy during the day?    [x]   Yes  []   No  Has anyone OBSERVED you stop breathing during your sleep?    [x]   Yes  []   No  Do you have or are you being  "treated for high blood PRESSURE?    [x]   Yes  []   No  BMI >32 kg/m2     []   Yes  [x]   No  AGE > 50 years    [x]   Yes  []   No  NECK circumference >16 inches / 40 cm    []   Yes  [x]   No  GENDER: male     [x]   Yes  []   No    GASTON Probability:  []   1-2 - Low  []   3-4 - Intermediate  [x]   5-8 - High    -BP in sleep clinic 189/80  Denies any chest-pain/dyspnea/focal-weakness/confusion  States he had a salty breakfast   ESRD patient compliant with his home therapies     Body mass index is 25.19 kg/m².  Wt Readings from Last 3 Encounters:   05/03/24 73 kg (160 lb 14.4 oz)   05/03/24 74.6 kg (164 lb 6.4 oz)   05/01/24 73.5 kg (162 lb)         Disclaimer Sleep History: The above sleep history is based on this sleep physician's in room encounter with the patient. Pre encounter self administered questionnaires are taken into consideration and discussed with patient for any discordance. The above documentation by this sleep physician is the most accurate clinical information determined by in room sleep physician encounter with patient.       Medications: reviewed    Review of systems is negative unless otherwise noted per HPI   Disclaimer History: The above history is based on this sleep physician's in room encounter with the patient. Pre encounter self administered questionnaires are taken into consideration and discussed with patient for any discordance. The above documentation by this sleep physician is the most accurate clinical information determined by in room sleep physician encounter with patient.     Physical exam:  Vitals:    05/03/24 1100   BP: (!) 189/80   Pulse: 70   SpO2: 94%   Weight: 73 kg (160 lb 14.4 oz)   Height: 170.2 cm (67.01\")    Body mass index is 25.19 kg/m².   CONSTITUTIONAL:  Non-toxic, In no overt distress   Head: normocephalic   ENT: Mallampati class IV, + macroglossia, no septal defects   NECK: No ROM limitation   RESPIRATORY SYSTEM: Breath sounds are diminished bilateral there are " occasional mild rales at bases with deep inhalation, no accessory muscle use  CARDIOVASULAR SYSTEM: Heart sounds are regular rhythm and normal rate, no rub, no gallop, no edema  NEUROLOGICAL SYSTEM: Oriented x 3, No gross focal deficits   PSYCHIATRIC SYSTEM: Goal oriented, affect full range appropriate        Imaging/Diagnostics reviewed:     4/24/2024 polysomnography  79.5 minutes sleep  Comments:  Epoch by Epoch review by the sleep medicine physician. Behavioral observations: Lights out approximately 9:10 PM with screens in bed to include phone versus tablet device and TV in room,  sleep onset within 14 minutes after lights out. Poor sleep efficiency. Patient can be seen on video polysmnography pulling at EEG, Plethysmography, oximetry, and PTAF during wake periods. This is also consistent with PSG technician notes reviewed. Less than 2 hours of sleep. I do not consider any of the events scored valid due to the above. No supine sleep. No REM sleep. No appreciable parasomnias on very limited video polysomnography - unable to rule out parasomnias with this limited study. PSG technician notes reviewed patient requested to abort study at 11:26 pm. Clinical correlation recommended for precipitant of environmental sleep disturbance not commonly reported in sleep centers vs other insomnia vs other sleep disorder. This polysomnography is non-diagnostic for sleep disordered breathing. Recommendations as below.     Diagnosis:  · Other Sleep Disorders-G47.8  · Non-diagnostic for sleep disordered breathing         Recommendations:  · Schedule the patient for follow-up in sleep clinic for clinical correlation and to discuss the next most appropriate steps.  · A repeat sleep study will be necessary to rule out sleep disordered breathing which must be discussed with sleep physician in sleep clinic prior to ordering.      Assessment and plan:  Suspected sleep apnea [R29.818]/Witnessed episode of apnea [R06.81]     patient's  symptoms and examination is consistent with sleep apnea (G47.30). I have talked to the patient about the signs and symptoms of sleep apnea. In addition, I have also discussed pathophysiology of sleep apnea.  I also discussed the complications of untreated sleep apnea including effects on hypertension, diabetes mellitus and nonrestorative sleep with hypersomnia which can increase risk for motor vehicle accidents.  Untreated sleep apnea is also a risk factor for development of atrial fibrillation, hypertension, insulin resistance and cerebrovascular accident.  Discussed in detail of various testing methods including home-based and lab based sleep studies.  Based on history and physical examination and other comorbidities the most appropriate study is Home Sleep Study.  The order for the sleep study is placed in [facility].  The test will be scheduled after approval from insurance. Treatment and management will be discussed after the test is completed.  Patient was given opportunity to ask questions and all the questions were answered.   -The above testing is Medically Necessary:   Patient endorses he will be more comfortable with home sleep study rather than an in lab polysomnopgrahy   The patient has a high pre-test probability most certainly a home sleep study may rule in sleep apnea  I have taken appropriate counseling measures for sleep hygiene/stimulus control   Snoring (R06.83), snoring is the sound created by turbulent airflow vibrating upper airway soft tissue due to limitation of inspiratory airflow. I have also discussed factors affecting snoring including sleep deprivation, sleeping on the back and alcohol ingestion. To minimize snoring, patient is advised to have adequate sleep, sleep on the side and avoid alcohol and sedative medications before bedtime  Excessive daytime sleepiness .  Patient endorses subjective excessive daytime sleepiness with sleep physician encounter which was consistent with patient's  pre-encounter self-administered [place] Sleepiness Scale of Total score: 12.  There are many causes for daytime excessive sleepiness including depression, shiftwork syndrome, and other medical disorders including heart, kidney and liver failure.  From sleep disorders perspective this is sleep disordered breathing until proven otherwise. The most common cause of excessive sleepiness is due to sleep apnea with frequent awakenings during sleep time.  I have discussed safety of driving and to remain vigilant while driving; patient verbalized understanding of counseling.  Overweight, patient's BMI is Body mass index is 25.19 kg/m².. I have discussed the relationship between weight and sleep apnea.There is direct correlation between weight and severity of sleep apnea.  Weight reduction is encouraged, as it is going to reduce the severity of sleep apnea. I have also discussed with the patient diet and exercise to achieve ideal body weight.  Inadequate sleep hygiene [Z72.821]  Counseled the patient lifestyle modifications as below to be applied especially night of any sleep study, the patient verbalized understanding of same. Follow up with PCP to reinforce my counseling towards healthy lifestyle modifications if sleep studies are negative.  HTN, Counseled follow up with PCP/Nephrology for HTN management after this visit . This medical condition would make the patient eligible for a trial of PAP therapy even if sleep study reveals mild severity sleep apnea.      I have also discussed with the patient the following  Stimulus Control/Sleep hygiene: Maintain a regular bedtime and wake time, not to watch television or use screens in bed, limit caffeine-containing beverages before bed time and avoid naps during the day, reserve bed for intimacy or sleep only.  If patient encounters 20 minutes or more in bed without sleep, then instructed to get out of bed to chair, in dimly lit or dark area, pursue a boring/non-stimulating  activity, and return to bed only when sleepy (repeat this step as often as necessary).    Adequate amount of sleep.  Generally most people needs about 7 to 8 hours of sleep.      Return for 31 to 90 days after PAP setup with down load.  Patient's questions were answered            EMR Dragon/Transcription disclaimer:   Much of this encounter note is an electronic transcription/translation of spoken language to printed text. The electronic translation of spoken language may permit erroneous, or at times, nonsensical words or phrases to be inadvertently transcribed; Although I have reviewed the note for such errors, some may still exist.     NPI #: [id number]    Gomez Conroy, DO  Sleep Medicine  [facility]  05/03/24

## 2024-05-06 LAB — FUNGUS WND CULT: ABNORMAL

## 2024-05-07 ENCOUNTER — TELEPHONE (OUTPATIENT)
Dept: INTERNAL MEDICINE | Facility: CLINIC | Age: 79
End: 2024-05-07
Payer: MEDICARE

## 2024-05-07 DIAGNOSIS — B99.9 RECURRENT INFECTIONS: Primary | ICD-10-CM

## 2024-05-07 DIAGNOSIS — D84.821 IMMUNOSUPPRESSION DUE TO DRUG THERAPY: ICD-10-CM

## 2024-05-07 DIAGNOSIS — Z79.899 IMMUNOSUPPRESSION DUE TO DRUG THERAPY: ICD-10-CM

## 2024-05-07 NOTE — TELEPHONE ENCOUNTER
Caller: Jose Wang    Relationship: Emergency Contact    Best call back number: [phone]     What is the medical concern/diagnosis: RECURRENT INFECTIONS    What specialty or service is being requested: INFECTIOUS DISEASE    What is the provider, practice or medical service name: [facility] Infectious Diseases    What is the office location: [address] [place] [address], [address]. [place] [address]    What is the office phone number: [phone]     Any additional details: PATIENTS DAUGHTER STATES THAT THE PATIENT DOES NOT WANT TO GET OFF OF THE Risankizumab-rzaa (Skyrizi) 150 MG/ML solution prefilled syringe  PRESCRIPTION BECAUSE HE BELIEVES IT HELPS AND WOULD LIKE TO DISCUSS WITH THE INFECTIOUS DISEASE DOCTOR IF HE CAN STAY ON THIS MEDICATION OR HE RECOMMENDS ANOTHER COURSE OF ACTION

## 2024-05-13 ENCOUNTER — HOSPITAL ENCOUNTER (OUTPATIENT)
Dept: RESPIRATORY THERAPY | Facility: HOSPITAL | Age: 79
Discharge: HOME | End: 2024-05-13
Payer: MEDICARE

## 2024-05-13 DIAGNOSIS — R06.09 DYSPNEA ON EXERTION: ICD-10-CM

## 2024-05-13 DIAGNOSIS — R06.01 ORTHOPNEA: ICD-10-CM

## 2024-05-13 PROCEDURE — 94729 DIFFUSING CAPACITY: CPT

## 2024-05-13 PROCEDURE — 94726 PLETHYSMOGRAPHY LUNG VOLUMES: CPT

## 2024-05-13 PROCEDURE — 94060 EVALUATION OF WHEEZING: CPT

## 2024-05-13 RX ORDER — ALBUTEROL SULFATE 2.5 MG/3ML
2.5 SOLUTION RESPIRATORY (INHALATION) ONCE
Status: COMPLETED | OUTPATIENT
Start: 2024-05-13 | End: 2024-05-13

## 2024-05-13 RX ADMIN — ALBUTEROL SULFATE 2.5 MG: 2.5 SOLUTION RESPIRATORY (INHALATION) at 08:31

## 2024-05-16 ENCOUNTER — TRANSCRIBE ORDERS (OUTPATIENT)
Dept: VASCULAR SURGERY | Facility: HOSPITAL | Age: 79
End: 2024-05-16
Payer: MEDICARE

## 2024-05-16 DIAGNOSIS — N18.6 ESRD (END STAGE RENAL DISEASE): Primary | ICD-10-CM

## 2024-05-17 ENCOUNTER — HOSPITAL ENCOUNTER (OUTPATIENT)
Dept: NUCLEAR MEDICINE | Facility: HOSPITAL | Age: 79
Discharge: HOME | End: 2024-05-17
Payer: MEDICARE

## 2024-05-17 DIAGNOSIS — J98.4 RESTRICTIVE LUNG DISEASE: Primary | ICD-10-CM

## 2024-05-17 DIAGNOSIS — R06.02 SHORTNESS OF BREATH: ICD-10-CM

## 2024-05-17 LAB
BH CV IMMEDIATE POST TECH DATA BLOOD PRESSURE: NORMAL MMHG
BH CV IMMEDIATE POST TECH DATA HEART RATE: 75 BPM
BH CV IMMEDIATE POST TECH DATA OXYGEN SATS: 98 %
BH CV REST NUCLEAR ISOTOPE DOSE: 9.3 MCI
BH CV SIX MINUTE RECOVERY TECH DATA BLOOD PRESSURE: NORMAL
BH CV SIX MINUTE RECOVERY TECH DATA HEART RATE: 72 BPM
BH CV SIX MINUTE RECOVERY TECH DATA OXYGEN SATURATION: 96 %
BH CV STRESS BP STAGE 1: NORMAL
BH CV STRESS COMMENTS STAGE 1: NORMAL
BH CV STRESS DOSE REGADENOSON STAGE 1: 0.4
BH CV STRESS DURATION MIN STAGE 1: 0
BH CV STRESS DURATION SEC STAGE 1: 10
BH CV STRESS HR STAGE 1: 73
BH CV STRESS NUCLEAR ISOTOPE DOSE: 36.8 MCI
BH CV STRESS O2 STAGE 1: 99
BH CV STRESS PROTOCOL 1: NORMAL
BH CV STRESS RECOVERY BP: NORMAL MMHG
BH CV STRESS RECOVERY HR: 72 BPM
BH CV STRESS RECOVERY O2: 94 %
BH CV STRESS STAGE 1: 1
BH CV THREE MINUTE POST TECH DATA BLOOD PRESSURE: NORMAL MMHG
BH CV THREE MINUTE POST TECH DATA HEART RATE: 75 BPM
BH CV THREE MINUTE POST TECH DATA OXYGEN SATURATION: 97 %
LV EF NUC BP: 36 %
MAXIMAL PREDICTED HEART RATE: 142 BPM
PERCENT MAX PREDICTED HR: 52.82 %
STRESS BASELINE BP: NORMAL MMHG
STRESS BASELINE HR: 71 BPM
STRESS O2 SAT REST: 95 %
STRESS PERCENT HR: 62 %
STRESS POST O2 SAT PEAK: 99 %
STRESS POST PEAK BP: NORMAL MMHG
STRESS POST PEAK HR: 75 BPM
STRESS TARGET HR: 121 BPM

## 2024-05-17 PROCEDURE — 93017 CV STRESS TEST TRACING ONLY: CPT

## 2024-05-17 PROCEDURE — 25010000002 REGADENOSON 0.4 MG/5ML SOLUTION

## 2024-05-17 PROCEDURE — 0 TECHNETIUM TETROFOSMIN KIT

## 2024-05-17 PROCEDURE — 78452 HT MUSCLE IMAGE SPECT MULT: CPT

## 2024-05-17 PROCEDURE — A9502 TC99M TETROFOSMIN: HCPCS

## 2024-05-17 RX ORDER — REGADENOSON 0.08 MG/ML
0.4 INJECTION, SOLUTION INTRAVENOUS
Status: COMPLETED | OUTPATIENT
Start: 2024-05-17 | End: 2024-05-17

## 2024-05-17 RX ADMIN — TETROFOSMIN 1 DOSE: 1.38 INJECTION, POWDER, LYOPHILIZED, FOR SOLUTION INTRAVENOUS at 07:20

## 2024-05-17 RX ADMIN — REGADENOSON 0.4 MG: 0.08 INJECTION, SOLUTION INTRAVENOUS at 08:52

## 2024-05-17 RX ADMIN — TETROFOSMIN 1 DOSE: 1.38 INJECTION, POWDER, LYOPHILIZED, FOR SOLUTION INTRAVENOUS at 08:52

## 2024-05-21 RX ORDER — CLOPIDOGREL BISULFATE 75 MG/1
75 TABLET ORAL DAILY
Qty: 90 TABLET | Refills: 3 | Status: SHIPPED | OUTPATIENT
Start: 2024-05-21

## 2024-05-24 ENCOUNTER — PREP FOR SURGERY (OUTPATIENT)
Dept: OTHER | Facility: HOSPITAL | Age: 79
End: 2024-05-24
Payer: MEDICARE

## 2024-05-24 ENCOUNTER — HOSPITAL ENCOUNTER (OUTPATIENT)
Dept: CT IMAGING | Facility: HOSPITAL | Age: 79
Discharge: HOME | End: 2024-05-24
Payer: MEDICARE

## 2024-05-24 ENCOUNTER — OFFICE VISIT (OUTPATIENT)
Dept: CARDIOLOGY | Facility: CLINIC | Age: 79
End: 2024-05-24
Payer: MEDICARE

## 2024-05-24 ENCOUNTER — TELEPHONE (OUTPATIENT)
Dept: CARDIOLOGY | Facility: CLINIC | Age: 79
End: 2024-05-24
Payer: MEDICARE

## 2024-05-24 VITALS
BODY MASS INDEX: 25.8 KG/M2 | HEART RATE: 72 BPM | HEIGHT: 67 IN | WEIGHT: 164.4 LBS | DIASTOLIC BLOOD PRESSURE: 76 MMHG | SYSTOLIC BLOOD PRESSURE: 156 MMHG

## 2024-05-24 DIAGNOSIS — R06.02 SHORTNESS OF BREATH: Primary | ICD-10-CM

## 2024-05-24 DIAGNOSIS — J18.9 MULTIFOCAL PNEUMONIA: ICD-10-CM

## 2024-05-24 DIAGNOSIS — R94.39 ABNORMAL STRESS TEST: ICD-10-CM

## 2024-05-24 DIAGNOSIS — E78.2 MIXED DYSLIPIDEMIA: ICD-10-CM

## 2024-05-24 DIAGNOSIS — I10 ESSENTIAL HYPERTENSION: ICD-10-CM

## 2024-05-24 DIAGNOSIS — R94.39 ABNORMAL NUCLEAR STRESS TEST: ICD-10-CM

## 2024-05-24 DIAGNOSIS — J90 PLEURAL EFFUSION: ICD-10-CM

## 2024-05-24 PROCEDURE — 71250 CT THORAX DX C-: CPT

## 2024-05-24 RX ORDER — LOSARTAN POTASSIUM 50 MG/1
50 TABLET ORAL NIGHTLY
Qty: 90 TABLET | Refills: 3 | Status: SHIPPED | OUTPATIENT
Start: 2024-05-24

## 2024-05-24 RX ORDER — LOSARTAN POTASSIUM 25 MG/1
25 TABLET ORAL NIGHTLY
COMMUNITY
Start: 2024-05-07 | End: 2024-05-24 | Stop reason: SDUPTHER

## 2024-05-24 RX ORDER — SODIUM CHLORIDE 0.9 % (FLUSH) 0.9 %
10 SYRINGE (ML) INJECTION EVERY 12 HOURS SCHEDULED
OUTPATIENT
Start: 2024-05-24

## 2024-05-24 RX ORDER — GENTAMICIN SULFATE 1 MG/G
1 CREAM TOPICAL DAILY
COMMUNITY
Start: 2024-05-13

## 2024-05-24 RX ORDER — SODIUM CHLORIDE 9 MG/ML
40 INJECTION, SOLUTION INTRAVENOUS AS NEEDED
OUTPATIENT
Start: 2024-05-24

## 2024-05-24 RX ORDER — SODIUM CHLORIDE 0.9 % (FLUSH) 0.9 %
10 SYRINGE (ML) INJECTION AS NEEDED
OUTPATIENT
Start: 2024-05-24

## 2024-05-24 NOTE — PROGRESS NOTES
Chief Complaint  Hypertension and Follow-up (F/U post testing results.  Complete. )    Subjective        History of Present Illness  Nelson Wang presents to [facility] CARDIOLOGY for follow up.   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  He continues to have episodes of dyspnea both at rest and with exertion.  He has shortness of breath when he lays down that is improved when he sits up.  He has no chest pain.  He has no dizziness, lightheadedness, palpitations or syncope.    Past Medical History:   Diagnosis Date    Anemia     Ankle pain, right     Anxiety and depression     CKD (chronic kidney disease), stage IV     Diabetes     Gout     High cholesterol     HL (hearing loss)     Hyperkalemia     Hypertension     Hypothyroidism     Insomnia     Night terrors     Psoriasis     Sleep walking     Vitiligo        ALLERGY  No Known Allergies     Past Surgical History:   Procedure Laterality Date    ANKLE SURGERY  11/1990    APPENDECTOMY N/A 1954    BRONCHOSCOPY N/A 3/1/2024    Procedure: BRONCHOSCOPY WITH BAL AND WASHINGS;  Surgeon: Sandra Espinal MD;  Location: [facility] ENDOSCOPY;  Service: Pulmonary;  Laterality: N/A;  PNEUMONIA    COLONOSCOPY N/A 06/2022    [facility]    HIP BIPOLAR REPLACEMENT Right 01/2000    INSERTION HEMODIALYSIS CATHETER Left 4/9/2024    Procedure: HEMODIALYSIS CATHETER INSERTION;  Surgeon: Jose Berry MD;  Location: [facility];  Service: General;  Laterality: Left;    INSERTION HEMODIALYSIS CATHETER N/A 4/12/2024    Procedure: Tunneled hemodialysis catheter insertion;  Surgeon: Enrique Vinson MD;  Location: [facility] OR;  Service: Vascular;  Laterality: N/A;    INSERTION PERITONEAL DIALYSIS CATHETER N/A 3/27/2023    Procedure: LAPAROSCOPIC INSERTION  PERITONEAL DIALYSIS CATHETER, LAPAROSCOPIC OMENTOPEXY WITH LYSIS OF ADHESIONS;  Surgeon: Jose Berry MD;  [facility];  Service: General;  Laterality: N/A;    INSERTION PERITONEAL DIALYSIS CATHETER Left 2023    Procedure: REVISION OF PERITONEAL DIALYSIS CATHETER;  Surgeon: Radha Oreilly MD;  Location:  [facility] MAIN OR;  Service: General;  Laterality: Left;    REMOVAL PERITONEAL DIALYSIS CATHETER N/A 2024    Procedure: REMOVAL PERITONEAL DIALYSIS CATHETER;  Surgeon: Jose Berry MD;  Location:  [facility] MAIN OR;  Service: General;  Laterality: N/A;    RENAL BIOPSY Left 07/15/2022        Social History     Socioeconomic History    Marital status:    Tobacco Use    Smoking status: Former     Current packs/day: 0.00     Average packs/day: 1 pack/day for 10.0 years (10.0 ttl pk-yrs)     Types: Cigarettes     Start date:      Quit date: 2000     Years since quittin.4     Passive exposure: Past    Smokeless tobacco: Never    Tobacco comments:     quit 25 years ago, chews nicotine gum in past   Vaping Use    Vaping status: Never Used   Substance and Sexual Activity    Alcohol use: Yes     Comment: 1 DRINK/DAY-rarely    Drug use: Never    Sexual activity: Defer       Family History   Problem Relation Age of Onset    Diabetes Mother     Heart disease Father     Cancer Sister 35    Diabetes Sister     Diabetes Brother     Sleep apnea Paternal Aunt     Heart disease Paternal Uncle     Sleep apnea Daughter     Malig Hyperthermia Neg Hx         Current Outpatient Medications on File Prior to Visit   Medication Sig    allopurinol (ZYLOPRIM) 100 MG tablet Take 1 tablet by mouth Daily. As needed    ARIPiprazole (ABILIFY) 2 MG tablet Take 1 tablet by mouth 2 (Two) Times a Day.    aspirin 81 MG chewable tablet Chew 1 tablet Daily.    atorvastatin (LIPITOR) 40 MG tablet Take 1 tablet by mouth Daily.    benzonatate (TESSALON) 100 MG capsule Take 1 capsule by mouth 3  (Three) Times a Day.    Budeson-Glycopyrrol-Formoterol (BREZTRI) 160-9-4.8 MCG/ACT aerosol inhaler Inhale 2 puffs 2 (Two) Times a Day.    carvedilol (COREG) 6.25 MG tablet Take 1 tablet by mouth Daily for 30 days.    clopidogrel (PLAVIX) 75 MG tablet Take 1 tablet by mouth Daily.    colchicine 0.6 MG tablet Take 1 tablet by mouth Every 12 (Twelve) Hours.    famotidine (Pepcid) 20 MG tablet Take 1 tablet by mouth.    fluconazole (DIFLUCAN) 200 MG tablet Take 1 tablet by mouth 3 (Three) Times a Week. Take AFTER dialysis on Tuesday, Thursday, and Saturdays. Indications: Infection Within the Abdomen    gentamicin (GARAMYCIN) 0.1 % cream Apply 1 Application topically to the appropriate area as directed Daily.    Insulin Glargine (LANTUS SOLOSTAR) 100 UNIT/ML injection pen Inject 10 Units under the skin into the appropriate area as directed Every Night.    ipratropium-albuterol (DUO-NEB) 0.5-2.5 mg/3 ml nebulizer Take 3 mL by nebulization Every 4 (Four) Hours As Needed for Wheezing or Shortness of Air. Indications: Chronic Obstructive Lung Disease, J44.9    levothyroxine (SYNTHROID, LEVOTHROID) 150 MCG tablet Take 1 tablet by mouth Every Morning for 30 days.    Lidocaine 5 % cream Apply  topically to the appropriate area as directed 3 (Three) Times a Day.    LORazepam (ATIVAN) 0.5 MG tablet Take 1 tablet by mouth.    Methoxy PEG-Epoetin Beta (MIRCERA IJ) 30 mcg Every 14 (Fourteen) Days.    nystatin (MYCOSTATIN) 100,000 unit/mL suspension Take 5 mL by mouth 3 (Three) Times a Day.    predniSONE (DELTASONE) 10 MG tablet Take 1 tablet by mouth Daily.    Risankizumab-rzaa (Skyrizi) 150 MG/ML solution prefilled syringe Inject 150 mg under the skin into the appropriate area as directed Every 3 (Three) Months.    sertraline (ZOLOFT) 100 MG tablet Take 1 tablet by mouth Every Night.    torsemide (DEMADEX) 100 MG tablet Take 1 tablet by mouth Daily.    [DISCONTINUED] losartan (COZAAR) 25 MG tablet Take 1 tablet by mouth Every  "Night.     No current facility-administered medications on file prior to visit.       Objective   Vitals:    05/24/24 0917   BP: 156/76   Pulse: 72   Weight: 74.6 kg (164 lb 6.4 oz)   Height: 170.2 cm (67.01\")       Physical Exam  Constitutional:       General: He is awake. He is not in acute distress.     Appearance: Normal appearance.   HENT:      Head: Normocephalic.      Nose: Nose normal. No congestion.   Eyes:      Extraocular Movements: Extraocular movements intact.      Conjunctiva/sclera: Conjunctivae normal.      Pupils: Pupils are equal, round, and reactive to light.   Neck:      Thyroid: No thyromegaly.      Vascular: No JVD.   Cardiovascular:      Rate and Rhythm: Normal rate and regular rhythm.      Chest Wall: PMI is not displaced.      Pulses: Normal pulses.      Heart sounds: Normal heart sounds, S1 normal and S2 normal. No murmur heard.     No friction rub. No gallop. No S3 or S4 sounds.   Pulmonary:      Effort: Pulmonary effort is normal.      Breath sounds: Normal breath sounds. No wheezing, rhonchi or rales.   Abdominal:      General: Bowel sounds are normal.      Palpations: Abdomen is soft.      Tenderness: There is no abdominal tenderness.   Musculoskeletal:      Cervical back: No tenderness.      Right lower leg: No edema.      Left lower leg: No edema.   Lymphadenopathy:      Cervical: No cervical adenopathy.   Skin:     General: Skin is warm and dry.      Capillary Refill: Capillary refill takes less than 2 seconds.      Coloration: Skin is not cyanotic.      Findings: No petechiae or rash.      Nails: There is no clubbing.   Neurological:      Mental Status: He is alert.   Psychiatric:         Mood and Affect: Mood normal.         Behavior: Behavior is cooperative.           Result Review     The following data was reviewed by BECKY Guan on 05/24/24.      CMP          4/14/2024    07:59 4/18/2024    00:00 5/2/2024    00:00   CMP   Glucose 103      BUN 30  33      "   Creatinine 4.73      EGFR 11.9      Sodium 136      Potassium 4.4      Chloride 103      Calcium 9.2   8.7       Albumin 3.1      BUN/Creatinine Ratio 6.3      Anion Gap 9.3         Details          This result is from an external source.             CBC w/diff          4/25/2024    00:00 5/2/2024    00:00 5/9/2024    00:00   CBC w/Diff   Hemoglobin 8.3        24.9     8.4        25.2     9.0        27.0          Details          This result is from an external source.              Lipid Panel          12/6/2023    09:51 3/22/2024    10:19   Lipid Panel   Total Cholesterol 113  147    Triglycerides 64  118    HDL Cholesterol 49  80    VLDL Cholesterol 14  20    LDL Cholesterol  50  47    LDL/HDL Ratio 1.04  0.54        Results for orders placed during the hospital encounter of 02/16/24    Adult Transthoracic Echo Complete W/ Cont if Necessary Per Protocol    Interpretation Summary    Left ventricular systolic function is normal. Calculated left ventricular EF = 56.6%    Left ventricular wall thickness is consistent with mild concentric hypertrophy.    Left ventricular diastolic function is consistent with (grade I) impaired relaxation and age.    Aortic valve sclerosis with minimal aortic valve stenosis is present.    Results for orders placed during the hospital encounter of 05/17/24    Stress Test With Myocardial Perfusion One Day    Interpretation Summary  Patient received Lexiscan 0.4 mg IV infusion over 10 seconds.  Baseline ECG shows normal sinus rhythm.  At peak stress, no ischemic ST-T changes were noted.  No significant arrhythmias were seen.  Myocardial perfusion was abnormal demonstrating medium area of severe perfusion defect in the distal inferior/inferoapical segment with minimal reversibility on resting images, more likely related to attenuation artifact.  A small area of mild to moderate perfusion defect was noted in the distal anterior segment with reversibility on resting images which could  represent myocardial ischemia in the right clinical setting.  The left ventricular was normal in size with moderately reduced systolic function.  Calculated LVEF is 36%.  Hypokinesis of the aforementioned segments was noted.  Impressions are consistent with high risk study.  Consider further evaluation if clinically indicated.    No results found for this or any previous visit.          Procedures    Assessment & Plan  Diagnoses and all orders for this visit:    1. Shortness of breath (Primary)    2. Essential hypertension    3. Mixed dyslipidemia    4. Abnormal nuclear stress test    Other orders  -     losartan (COZAAR) 50 MG tablet; Take 1 tablet by mouth Every Night.  Dispense: 90 tablet; Refill: 3      Assessment & Plan      1.  Patient with ongoing dyspnea both at rest and with exertion.  Recent stress test was high risk and demonstrated an area of mild to moderate perfusion defect in the distal anterior segment with reversibility on resting images which could represent myocardial ischemia in the right clinical setting.  Patient has been started on clopidogrel 75 mg daily.  He will be scheduled for left heart catheterization with Dr. Nichols on May 29 with possible PCI.  Risks benefits and possible outcomes were discussed with the patient and his daughter today.  He was agreeable to proceed.  Orders have been placed.  Hospital will be calling him to notify them of preop instructions and advise on at time of arrival.    2.  Blood pressure is elevated in the office and he notes elevated blood pressure readings at home.  Increase losartan to 50 mg daily.  Continue to monitor blood pressure at home and notify us if it remains elevated.    3.  Continue statin therapy.    4.  Left heart catheterization to be done as discussed above.  Further recommendations pending those results.      The medical services provided during this encounter are part of ongoing care related to this patient's single serious condition or  complex condition.    Follow Up   Return in about 6 weeks (around 7/5/2024) for With BECKY Torres.    Patient was given instructions and counseling regarding his condition or for health maintenance advice. Please see specific information pulled into the AVS if appropriate.         BECKY Guan  05/24/24  09:35 EDT    Dictated Utilizing Dragon Dictation

## 2024-05-24 NOTE — H&P (VIEW-ONLY)
Chief Complaint  Hypertension and Follow-up (F/U post testing results.  Complete. )    Subjective        History of Present Illness  Nelson Wang presents to [facility] CARDIOLOGY for follow up.   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  He continues to have episodes of dyspnea both at rest and with exertion.  He has shortness of breath when he lays down that is improved when he sits up.  He has no chest pain.  He has no dizziness, lightheadedness, palpitations or syncope.    Past Medical History:   Diagnosis Date    Anemia     Ankle pain, right     Anxiety and depression     CKD (chronic kidney disease), stage IV     Diabetes     Gout     High cholesterol     HL (hearing loss)     Hyperkalemia     Hypertension     Hypothyroidism     Insomnia     Night terrors     Psoriasis     Sleep walking     Vitiligo        ALLERGY  No Known Allergies     Past Surgical History:   Procedure Laterality Date    ANKLE SURGERY  11/1990    APPENDECTOMY N/A 1954    BRONCHOSCOPY N/A 3/1/2024    Procedure: BRONCHOSCOPY WITH BAL AND WASHINGS;  Surgeon: Sandra Espinal MD;  Location: [facility] ENDOSCOPY;  Service: Pulmonary;  Laterality: N/A;  PNEUMONIA    COLONOSCOPY N/A 06/2022    [facility]    HIP BIPOLAR REPLACEMENT Right 01/2000    INSERTION HEMODIALYSIS CATHETER Left 4/9/2024    Procedure: HEMODIALYSIS CATHETER INSERTION;  Surgeon: Jose Berry MD;  Location: [facility];  Service: General;  Laterality: Left;    INSERTION HEMODIALYSIS CATHETER N/A 4/12/2024    Procedure: Tunneled hemodialysis catheter insertion;  Surgeon: Enrique Vinson MD;  Location: [facility] OR;  Service: Vascular;  Laterality: N/A;    INSERTION PERITONEAL DIALYSIS CATHETER N/A 3/27/2023    Procedure: LAPAROSCOPIC INSERTION  PERITONEAL DIALYSIS CATHETER, LAPAROSCOPIC OMENTOPEXY WITH LYSIS OF ADHESIONS;  Surgeon: Jose Berry MD;  Location: [facility] MAIN OR;  Service: General;  Laterality: N/A;    INSERTION PERITONEAL DIALYSIS CATHETER Left 2023    Procedure: REVISION OF PERITONEAL DIALYSIS CATHETER;  Surgeon: Radha Oreilly MD;  Location:  [facility] MAIN OR;  Service: General;  Laterality: Left;    REMOVAL PERITONEAL DIALYSIS CATHETER N/A 2024    Procedure: REMOVAL PERITONEAL DIALYSIS CATHETER;  Surgeon: Jose Berry MD;  Location:  [facility] MAIN OR;  Service: General;  Laterality: N/A;    RENAL BIOPSY Left 07/15/2022        Social History     Socioeconomic History    Marital status:    Tobacco Use    Smoking status: Former     Current packs/day: 0.00     Average packs/day: 1 pack/day for 10.0 years (10.0 ttl pk-yrs)     Types: Cigarettes     Start date:      Quit date: 2000     Years since quittin.4     Passive exposure: Past    Smokeless tobacco: Never    Tobacco comments:     quit 25 years ago, chews nicotine gum in past   Vaping Use    Vaping status: Never Used   Substance and Sexual Activity    Alcohol use: Yes     Comment: 1 DRINK/DAY-rarely    Drug use: Never    Sexual activity: Defer       Family History   Problem Relation Age of Onset    Diabetes Mother     Heart disease Father     Cancer Sister 35    Diabetes Sister     Diabetes Brother     Sleep apnea Paternal Aunt     Heart disease Paternal Uncle     Sleep apnea Daughter     Malig Hyperthermia Neg Hx         Current Outpatient Medications on File Prior to Visit   Medication Sig    allopurinol (ZYLOPRIM) 100 MG tablet Take 1 tablet by mouth Daily. As needed    ARIPiprazole (ABILIFY) 2 MG tablet Take 1 tablet by mouth 2 (Two) Times a Day.    aspirin 81 MG chewable tablet Chew 1 tablet Daily.    atorvastatin (LIPITOR) 40 MG tablet Take 1 tablet by mouth Daily.    benzonatate (TESSALON) 100 MG capsule Take 1 capsule by mouth 3  (Three) Times a Day.    Budeson-Glycopyrrol-Formoterol (BREZTRI) 160-9-4.8 MCG/ACT aerosol inhaler Inhale 2 puffs 2 (Two) Times a Day.    carvedilol (COREG) 6.25 MG tablet Take 1 tablet by mouth Daily for 30 days.    clopidogrel (PLAVIX) 75 MG tablet Take 1 tablet by mouth Daily.    colchicine 0.6 MG tablet Take 1 tablet by mouth Every 12 (Twelve) Hours.    famotidine (Pepcid) 20 MG tablet Take 1 tablet by mouth.    fluconazole (DIFLUCAN) 200 MG tablet Take 1 tablet by mouth 3 (Three) Times a Week. Take AFTER dialysis on Tuesday, Thursday, and Saturdays. Indications: Infection Within the Abdomen    gentamicin (GARAMYCIN) 0.1 % cream Apply 1 Application topically to the appropriate area as directed Daily.    Insulin Glargine (LANTUS SOLOSTAR) 100 UNIT/ML injection pen Inject 10 Units under the skin into the appropriate area as directed Every Night.    ipratropium-albuterol (DUO-NEB) 0.5-2.5 mg/3 ml nebulizer Take 3 mL by nebulization Every 4 (Four) Hours As Needed for Wheezing or Shortness of Air. Indications: Chronic Obstructive Lung Disease, J44.9    levothyroxine (SYNTHROID, LEVOTHROID) 150 MCG tablet Take 1 tablet by mouth Every Morning for 30 days.    Lidocaine 5 % cream Apply  topically to the appropriate area as directed 3 (Three) Times a Day.    LORazepam (ATIVAN) 0.5 MG tablet Take 1 tablet by mouth.    Methoxy PEG-Epoetin Beta (MIRCERA IJ) 30 mcg Every 14 (Fourteen) Days.    nystatin (MYCOSTATIN) 100,000 unit/mL suspension Take 5 mL by mouth 3 (Three) Times a Day.    predniSONE (DELTASONE) 10 MG tablet Take 1 tablet by mouth Daily.    Risankizumab-rzaa (Skyrizi) 150 MG/ML solution prefilled syringe Inject 150 mg under the skin into the appropriate area as directed Every 3 (Three) Months.    sertraline (ZOLOFT) 100 MG tablet Take 1 tablet by mouth Every Night.    torsemide (DEMADEX) 100 MG tablet Take 1 tablet by mouth Daily.    [DISCONTINUED] losartan (COZAAR) 25 MG tablet Take 1 tablet by mouth Every  "Night.     No current facility-administered medications on file prior to visit.       Objective   Vitals:    05/24/24 0917   BP: 156/76   Pulse: 72   Weight: 74.6 kg (164 lb 6.4 oz)   Height: 170.2 cm (67.01\")       Physical Exam  Constitutional:       General: He is awake. He is not in acute distress.     Appearance: Normal appearance.   HENT:      Head: Normocephalic.      Nose: Nose normal. No congestion.   Eyes:      Extraocular Movements: Extraocular movements intact.      Conjunctiva/sclera: Conjunctivae normal.      Pupils: Pupils are equal, round, and reactive to light.   Neck:      Thyroid: No thyromegaly.      Vascular: No JVD.   Cardiovascular:      Rate and Rhythm: Normal rate and regular rhythm.      Chest Wall: PMI is not displaced.      Pulses: Normal pulses.      Heart sounds: Normal heart sounds, S1 normal and S2 normal. No murmur heard.     No friction rub. No gallop. No S3 or S4 sounds.   Pulmonary:      Effort: Pulmonary effort is normal.      Breath sounds: Normal breath sounds. No wheezing, rhonchi or rales.   Abdominal:      General: Bowel sounds are normal.      Palpations: Abdomen is soft.      Tenderness: There is no abdominal tenderness.   Musculoskeletal:      Cervical back: No tenderness.      Right lower leg: No edema.      Left lower leg: No edema.   Lymphadenopathy:      Cervical: No cervical adenopathy.   Skin:     General: Skin is warm and dry.      Capillary Refill: Capillary refill takes less than 2 seconds.      Coloration: Skin is not cyanotic.      Findings: No petechiae or rash.      Nails: There is no clubbing.   Neurological:      Mental Status: He is alert.   Psychiatric:         Mood and Affect: Mood normal.         Behavior: Behavior is cooperative.           Result Review     The following data was reviewed by BECKY Guan on 05/24/24.      CMP          4/14/2024    07:59 4/18/2024    00:00 5/2/2024    00:00   CMP   Glucose 103      BUN 30  33      "   Creatinine 4.73      EGFR 11.9      Sodium 136      Potassium 4.4      Chloride 103      Calcium 9.2   8.7       Albumin 3.1      BUN/Creatinine Ratio 6.3      Anion Gap 9.3         Details          This result is from an external source.             CBC w/diff          4/25/2024    00:00 5/2/2024    00:00 5/9/2024    00:00   CBC w/Diff   Hemoglobin 8.3        24.9     8.4        25.2     9.0        27.0          Details          This result is from an external source.              Lipid Panel          12/6/2023    09:51 3/22/2024    10:19   Lipid Panel   Total Cholesterol 113  147    Triglycerides 64  118    HDL Cholesterol 49  80    VLDL Cholesterol 14  20    LDL Cholesterol  50  47    LDL/HDL Ratio 1.04  0.54        Results for orders placed during the hospital encounter of 02/16/24    Adult Transthoracic Echo Complete W/ Cont if Necessary Per Protocol    Interpretation Summary    Left ventricular systolic function is normal. Calculated left ventricular EF = 56.6%    Left ventricular wall thickness is consistent with mild concentric hypertrophy.    Left ventricular diastolic function is consistent with (grade I) impaired relaxation and age.    Aortic valve sclerosis with minimal aortic valve stenosis is present.    Results for orders placed during the hospital encounter of 05/17/24    Stress Test With Myocardial Perfusion One Day    Interpretation Summary  Patient received Lexiscan 0.4 mg IV infusion over 10 seconds.  Baseline ECG shows normal sinus rhythm.  At peak stress, no ischemic ST-T changes were noted.  No significant arrhythmias were seen.  Myocardial perfusion was abnormal demonstrating medium area of severe perfusion defect in the distal inferior/inferoapical segment with minimal reversibility on resting images, more likely related to attenuation artifact.  A small area of mild to moderate perfusion defect was noted in the distal anterior segment with reversibility on resting images which could  represent myocardial ischemia in the right clinical setting.  The left ventricular was normal in size with moderately reduced systolic function.  Calculated LVEF is 36%.  Hypokinesis of the aforementioned segments was noted.  Impressions are consistent with high risk study.  Consider further evaluation if clinically indicated.    No results found for this or any previous visit.          Procedures    Assessment & Plan  Diagnoses and all orders for this visit:    1. Shortness of breath (Primary)    2. Essential hypertension    3. Mixed dyslipidemia    4. Abnormal nuclear stress test    Other orders  -     losartan (COZAAR) 50 MG tablet; Take 1 tablet by mouth Every Night.  Dispense: 90 tablet; Refill: 3      Assessment & Plan      1.  Patient with ongoing dyspnea both at rest and with exertion.  Recent stress test was high risk and demonstrated an area of mild to moderate perfusion defect in the distal anterior segment with reversibility on resting images which could represent myocardial ischemia in the right clinical setting.  Patient has been started on clopidogrel 75 mg daily.  He will be scheduled for left heart catheterization with Dr. Nichols on May 29 with possible PCI.  Risks benefits and possible outcomes were discussed with the patient and his daughter today.  He was agreeable to proceed.  Orders have been placed.  Hospital will be calling him to notify them of preop instructions and advise on at time of arrival.    2.  Blood pressure is elevated in the office and he notes elevated blood pressure readings at home.  Increase losartan to 50 mg daily.  Continue to monitor blood pressure at home and notify us if it remains elevated.    3.  Continue statin therapy.    4.  Left heart catheterization to be done as discussed above.  Further recommendations pending those results.      The medical services provided during this encounter are part of ongoing care related to this patient's single serious condition or  complex condition.    Follow Up   Return in about 6 weeks (around 7/5/2024) for With BECKY Torres.    Patient was given instructions and counseling regarding his condition or for health maintenance advice. Please see specific information pulled into the AVS if appropriate.         BECKY Guan  05/24/24  09:35 EDT    Dictated Utilizing Dragon Dictation

## 2024-05-28 ENCOUNTER — LAB (OUTPATIENT)
Dept: LAB | Facility: HOSPITAL | Age: 79
End: 2024-05-28
Payer: MEDICARE

## 2024-05-28 DIAGNOSIS — R79.89 ELEVATED TSH: ICD-10-CM

## 2024-05-28 LAB
ANION GAP SERPL CALCULATED.3IONS-SCNC: 10.4 MMOL/L (ref 5–15)
BUN SERPL-MCNC: 14 MG/DL (ref 8–23)
BUN/CREAT SERPL: 6 (ref 7–25)
CALCIUM SPEC-SCNC: 9.3 MG/DL (ref 8.6–10.5)
CHLORIDE SERPL-SCNC: 107 MMOL/L (ref 98–107)
CO2 SERPL-SCNC: 23.6 MMOL/L (ref 22–29)
CREAT SERPL-MCNC: 2.33 MG/DL (ref 0.76–1.27)
DEPRECATED RDW RBC AUTO: 58.3 FL (ref 37–54)
EGFRCR SERPLBLD CKD-EPI 2021: 27.9 ML/MIN/1.73
ERYTHROCYTE [DISTWIDTH] IN BLOOD BY AUTOMATED COUNT: 16.3 % (ref 12.3–15.4)
GLUCOSE SERPL-MCNC: 113 MG/DL (ref 65–99)
HCT VFR BLD AUTO: 37.9 % (ref 37.5–51)
HGB BLD-MCNC: 11.2 G/DL (ref 13–17.7)
INR PPP: 0.96 (ref 0.86–1.15)
MCH RBC QN AUTO: 28.8 PG (ref 26.6–33)
MCHC RBC AUTO-ENTMCNC: 29.6 G/DL (ref 31.5–35.7)
MCV RBC AUTO: 97.4 FL (ref 79–97)
PLATELET # BLD AUTO: 121 10*3/MM3 (ref 140–450)
PMV BLD AUTO: 10 FL (ref 6–12)
POTASSIUM SERPL-SCNC: 4.4 MMOL/L (ref 3.5–5.2)
PROTHROMBIN TIME: 12.9 SECONDS (ref 11.8–14.9)
RBC # BLD AUTO: 3.89 10*6/MM3 (ref 4.14–5.8)
SODIUM SERPL-SCNC: 141 MMOL/L (ref 136–145)
T4 FREE SERPL-MCNC: 1.68 NG/DL (ref 0.93–1.7)
TSH SERPL DL<=0.05 MIU/L-ACNC: 5.81 UIU/ML (ref 0.27–4.2)
WBC NRBC COR # BLD AUTO: 4.27 10*3/MM3 (ref 3.4–10.8)

## 2024-05-28 PROCEDURE — 86376 MICROSOMAL ANTIBODY EACH: CPT

## 2024-05-28 PROCEDURE — 80048 BASIC METABOLIC PNL TOTAL CA: CPT

## 2024-05-28 PROCEDURE — 85027 COMPLETE CBC AUTOMATED: CPT

## 2024-05-28 PROCEDURE — 85610 PROTHROMBIN TIME: CPT

## 2024-05-28 PROCEDURE — 86800 THYROGLOBULIN ANTIBODY: CPT

## 2024-05-28 PROCEDURE — 84443 ASSAY THYROID STIM HORMONE: CPT | Performed by: INTERNAL MEDICINE

## 2024-05-28 PROCEDURE — 84439 ASSAY OF FREE THYROXINE: CPT | Performed by: INTERNAL MEDICINE

## 2024-05-28 RX ORDER — CARVEDILOL 25 MG/1
25 TABLET ORAL 2 TIMES DAILY WITH MEALS
COMMUNITY

## 2024-05-28 NOTE — PRE-PROCEDURE INSTRUCTIONS
PATIENT INSTRUCTED TO BE:    - NPO AFTER MIDNIGHT EXCEPT CAN HAVE SIPS OF WATER WITH HIS MEDICATION PRIOR TO PROCEDURE    -  INSTRUCTED NO LOTIONS, JEWELRY, PIERCINGS, OR DEODORANT DAY OF THE PROCEDURE    - INSTRUCTED TO TAKE THE FOLLOWING MEDICATIONS THE DAY OF SURGERY:       ALLOPURINOL IF NEEDED, ABILIFY, ASA, ATORVASTATIN, USE BREZTRI INHALER, CARVEDILOL, CLOPIDOGREL, COLCHICINE IF NEEDED, FAMOTIDINE IF NEEDED, DUO NEB IF NEEDED,  LEVOTHYROXINE, LOSARTAN, TORSEMIDE, TUMERIC    - INSTRUCTED PT TO FOLLOW ANY INSTRUCTIONS GIVEN BY HIS CARDIOLOGIST.    - INFORMED PT THEY ATTEMPT RADIAL APPROACH FIRST IF UNABLE TO PERFORM CATH WITH THAT APPROACH THEY WILL DO A FEMORAL APPROACH.  ALSO, INFORMED PT THEY WILL BE ON BEDREST POSTOP.  IF THE SURGEON FEELS  AN INTERVENTION OR STENTS NEEDS TO BE PLACED, HE/SHE WILL STAY OVER NIGHT FOR OBSERVATION AND MONITORING.     - INFORMED THE PATIENT HE CAN HAVE TWO VISITORS WITH HIM THE DAY OF THE PROCEDURE    - INSTRUCTED PT TO PARK IN THE NORTH END PARKING LOTS AND COME IN ENTRANCE C [facility] MAIN DOOR    -ARRIVAL TIME GIVEN BY CARDIOLOGIST OFFICE, INFORMED PT IF ARRIVAL TIME CHANGES OR ADJUSTMENTS NEED TO BE MADE IN THEIR ARRIVAL TIME, HE/SHE WOULD RECEIVE A CALL.      - PATIENT VERBALIZED UNDERSTANDING

## 2024-05-29 ENCOUNTER — HOSPITAL ENCOUNTER (OUTPATIENT)
Facility: HOSPITAL | Age: 79
Setting detail: HOSPITAL OUTPATIENT SURGERY
Discharge: HOME | End: 2024-05-29
Attending: INTERNAL MEDICINE | Admitting: INTERNAL MEDICINE
Payer: MEDICARE

## 2024-05-29 VITALS
BODY MASS INDEX: 25.83 KG/M2 | RESPIRATION RATE: 15 BRPM | WEIGHT: 160.72 LBS | TEMPERATURE: 98.2 F | HEART RATE: 63 BPM | DIASTOLIC BLOOD PRESSURE: 79 MMHG | HEIGHT: 66 IN | SYSTOLIC BLOOD PRESSURE: 144 MMHG | OXYGEN SATURATION: 96 %

## 2024-05-29 DIAGNOSIS — R94.39 ABNORMAL STRESS TEST: ICD-10-CM

## 2024-05-29 DIAGNOSIS — R06.02 SHORTNESS OF BREATH: ICD-10-CM

## 2024-05-29 LAB
THYROGLOB AB SERPL-ACNC: <1 IU/ML (ref 0–0.9)
THYROPEROXIDASE AB SERPL-ACNC: <9 IU/ML (ref 0–34)

## 2024-05-29 PROCEDURE — 25010000002 HEPARIN (PORCINE) PER 1000 UNITS: Performed by: INTERNAL MEDICINE

## 2024-05-29 PROCEDURE — 25010000002 MIDAZOLAM PER 1MG: Performed by: INTERNAL MEDICINE

## 2024-05-29 PROCEDURE — 25010000002 FENTANYL CITRATE (PF) 100 MCG/2ML SOLUTION: Performed by: INTERNAL MEDICINE

## 2024-05-29 PROCEDURE — C1894 INTRO/SHEATH, NON-LASER: HCPCS | Performed by: INTERNAL MEDICINE

## 2024-05-29 PROCEDURE — 99152 MOD SED SAME PHYS/QHP 5/>YRS: CPT

## 2024-05-29 PROCEDURE — C1769 GUIDE WIRE: HCPCS | Performed by: INTERNAL MEDICINE

## 2024-05-29 PROCEDURE — 93458 L HRT ARTERY/VENTRICLE ANGIO: CPT | Performed by: INTERNAL MEDICINE

## 2024-05-29 PROCEDURE — 25510000001 IOPAMIDOL PER 1 ML: Performed by: INTERNAL MEDICINE

## 2024-05-29 RX ORDER — ONDANSETRON 2 MG/ML
4 INJECTION INTRAMUSCULAR; INTRAVENOUS EVERY 6 HOURS PRN
Status: CANCELLED | OUTPATIENT
Start: 2024-05-29

## 2024-05-29 RX ORDER — SODIUM CHLORIDE 0.9 % (FLUSH) 0.9 %
10 SYRINGE (ML) INJECTION AS NEEDED
Status: DISCONTINUED | OUTPATIENT
Start: 2024-05-29 | End: 2024-05-29 | Stop reason: HOSPADM

## 2024-05-29 RX ORDER — SODIUM CHLORIDE 0.9 % (FLUSH) 0.9 %
10 SYRINGE (ML) INJECTION EVERY 12 HOURS SCHEDULED
Status: DISCONTINUED | OUTPATIENT
Start: 2024-05-29 | End: 2024-05-29 | Stop reason: HOSPADM

## 2024-05-29 RX ORDER — VERAPAMIL HYDROCHLORIDE 2.5 MG/ML
INJECTION, SOLUTION INTRAVENOUS
Status: DISCONTINUED | OUTPATIENT
Start: 2024-05-29 | End: 2024-05-29 | Stop reason: HOSPADM

## 2024-05-29 RX ORDER — SODIUM CHLORIDE 9 MG/ML
40 INJECTION, SOLUTION INTRAVENOUS AS NEEDED
Status: DISCONTINUED | OUTPATIENT
Start: 2024-05-29 | End: 2024-05-29 | Stop reason: HOSPADM

## 2024-05-29 RX ORDER — SODIUM CHLORIDE 9 MG/ML
50 INJECTION, SOLUTION INTRAVENOUS CONTINUOUS
Status: DISCONTINUED | OUTPATIENT
Start: 2024-05-29 | End: 2024-05-29 | Stop reason: HOSPADM

## 2024-05-29 RX ORDER — NITROGLYCERIN 0.4 MG/1
0.4 TABLET SUBLINGUAL
Status: CANCELLED | OUTPATIENT
Start: 2024-05-29

## 2024-05-29 RX ORDER — ACETAMINOPHEN 325 MG/1
650 TABLET ORAL EVERY 4 HOURS PRN
Status: CANCELLED | OUTPATIENT
Start: 2024-05-29

## 2024-05-29 RX ORDER — HEPARIN SODIUM 1000 [USP'U]/ML
INJECTION, SOLUTION INTRAVENOUS; SUBCUTANEOUS
Status: DISCONTINUED | OUTPATIENT
Start: 2024-05-29 | End: 2024-05-29 | Stop reason: HOSPADM

## 2024-05-29 RX ORDER — MIDAZOLAM HYDROCHLORIDE 2 MG/2ML
INJECTION, SOLUTION INTRAMUSCULAR; INTRAVENOUS
Status: DISCONTINUED | OUTPATIENT
Start: 2024-05-29 | End: 2024-05-29 | Stop reason: HOSPADM

## 2024-05-29 RX ORDER — ONDANSETRON 4 MG/1
4 TABLET, ORALLY DISINTEGRATING ORAL EVERY 6 HOURS PRN
Status: CANCELLED | OUTPATIENT
Start: 2024-05-29

## 2024-05-29 RX ORDER — LIDOCAINE HYDROCHLORIDE 20 MG/ML
INJECTION, SOLUTION INFILTRATION; PERINEURAL
Status: DISCONTINUED | OUTPATIENT
Start: 2024-05-29 | End: 2024-05-29 | Stop reason: HOSPADM

## 2024-05-29 RX ORDER — FENTANYL CITRATE 50 UG/ML
INJECTION, SOLUTION INTRAMUSCULAR; INTRAVENOUS
Status: DISCONTINUED | OUTPATIENT
Start: 2024-05-29 | End: 2024-05-29 | Stop reason: HOSPADM

## 2024-05-29 NOTE — CONSULTS
Cardiac Rehab Phase I - Occurrence #1    Education Topics:  Cardiac Rehab no     Topic Components:  Exercise no     Teaching Aids:  Phase 2 Brochure no     Teaching Method:  Written Instruction no     Teaching Recipient:  Patient no       Recovery/Discharge Instructions:  Cardiac Rehab Phase 2 no       Patient Qualification:  Cardiac Rehab Phase 2 no

## 2024-05-29 NOTE — DISCHARGE INSTRUCTIONS
DISCHARGE INSTRUCTIONS  VASCULAR  PROCEDURES  TR BAND      For your surgery you had:  IV sedation.     You may experience dizziness, drowsiness, or light-headedness for several hours following surgery/procedure.  Do not stay alone today or tonight.  Limit your activity for 24 hours.  Resume your diet slowly.  Follow whatever special dietary instructions you may have been given by your doctor.  You should not drive or operate machinery, drink alcohol, or sign legally binding documents for 24 hours or while you are taking pain medication.    NOTIFY YOUR DOCTOR IF YOU EXPERIENCE ANY OF THE FOLLOWING:  Temperature greater than 101 degrees Fahrenheit  Shaking Chills  Redness or excessive drainage from incision  Nausea, vomiting and/or pain that is not controlled by prescribed medications  Increase in bleeding or bleeding that is excessive  Unable to urinate in 6 hours after surgery  If unable to reach your doctor, please go to the closest Emergency Room    Medications per physician instructions as indicated on After Visit Summary.    Last dose of pain medication was given at:   .    [] TR BAND DISCHARGE INSTRUCTIONS:  For 24 hours after the procedure, or as directed by your health care provider:  Do not flex or bend the affected arm.  Do not push or pull heavy objects with the affected arm.  Do not operate machinery or power tools.  Keep affected arm elevated and rested for 48 hours.  Do not apply powder or lotion to the site.  Check your incision site every day for signs of infection. Check for:  Redness, swelling, or pain.  Fluid or blood.  Warmth.  Pus or a bad smell.  May remove bandaid: in 24 hours  Avoid manipulation of the wrist for 24 hours  After the dressing is removed, clean gently with soap and water, apply new dressing.   Avoid submerging site for one week or until healed.

## 2024-06-04 NOTE — PROGRESS NOTES
Primary Care Provider  Domingo Bravo MD   Referring Provider  No ref. provider found      Patient Complaint  Follow-up (2 MONTH ), Shortness of Breath (AT NIGHT), and Results (SLEEP STUDY)      Subjective          Nelson Wang presents to [facility]      History of Presenting Illness  Nelson Wang is a 79 y.o. male with restrictive lung defect, orthopnea, recent multifocal pneumonia, ESRD on hemodialysis, and tobacco use in remission, here for 6-week follow-up.    Patient was last seen by myself about 6 weeks ago after hospitalization at [facility] in April 2024 for sepsis due to peritonitis associated with PD catheter.  He has since transitioned to outpatient hemodialysis Tuesday/Thursday/Saturday, has a TDC.  He has completed course of fluconazole for Candida infection on peritoneal fluid culture.  Patient states he is feeling pretty good from a respiratory standpoint, although he does still have some shortness of air, particularly at night when laying flat.  He notices that his orthopnea is worse the longer he goes between dialysis treatments.  On the days that he gets dialysis, he notices that his shortness of breath is not as bad.  Per patient, nephrology is trying to remove more fluid little by little.  Patient states his shortness of breath is also dependent on his hemoglobin levels, can tell when they are getting lower as his shortness of breath will worsen.  He has chronic anemia due to kidney disease; most recent hemoglobin 10.2 on 5/30/2024.  Patient denies using any antibiotics or steroids for his lungs recently, denies any fevers or chills, no ER visits or hospitalizations for his breathing since he was last seen.  He denies any swelling, weight is stable.  Since his last visit, patient had a cardiac cath with Dr. Nichols 5/29/2024 which showed nonobstructing coronary artery disease.  Patient has been using Breztri twice daily  since his last visit, which helps his breathing some.  He also has DuoNeb treatments to use as needed, usually twice a day.  He is a former smoker, quit 24 years ago, 10 pack years.  Patient denies any hemoptysis, swollen lymph nodes, unintentional weight loss, or night sweats.  Overall, patient is doing well and has no additional concerns at this time.  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  Patient is able to perform ADLs without difficulty.  I have personally reviewed the review of systems, past family, social, medical and surgical histories; and agree with their findings.      Review of Systems    Review of Systems   Constitutional:  Negative for activity change, chills, fatigue, fever, unexpected weight gain and unexpected weight loss.   HENT:  Negative for congestion, ear discharge, ear pain, mouth sores, postnasal drip, rhinorrhea, sinus pressure, sore throat, swollen glands and trouble swallowing.    Eyes:  Negative for blurred vision, pain, discharge, itching and visual disturbance.   Respiratory:  Positive for shortness of breath (orthopnea; improves temporarily after dialysis with fluid removal). Negative for apnea, cough, chest tightness, wheezing and stridor.    Cardiovascular:  Negative for chest pain, palpitations and leg swelling.   Gastrointestinal:  Negative for abdominal distention, abdominal pain, constipation, diarrhea, nausea, vomiting, GERD and indigestion.   Musculoskeletal:  Negative for arthralgias, joint swelling and myalgias.   Skin:  Negative for color change.   Neurological:  Negative for dizziness, weakness, light-headedness and headache.      Sleep: Negative for Excessive daytime sleepiness  Negative for morning headaches  Negative for Snoring      Family History   Problem Relation Age of Onset    Diabetes Mother     Heart disease Father     Cancer Sister 35    Diabetes Sister     Diabetes Brother     Sleep apnea Paternal Aunt     Heart disease Paternal Uncle      Sleep apnea Daughter     Malig Hyperthermia Neg Hx         Social History     Socioeconomic History    Marital status:    Tobacco Use    Smoking status: Former     Current packs/day: 0.00     Average packs/day: 1 pack/day for 10.0 years (10.0 ttl pk-yrs)     Types: Cigarettes     Start date:      Quit date: 2000     Years since quittin.4     Passive exposure: Past    Smokeless tobacco: Never    Tobacco comments:     quit 25 years ago, chews nicotine gum in past   Vaping Use    Vaping status: Never Used   Substance and Sexual Activity    Alcohol use: Yes     Comment: 1 DRINK/DAY-rarely    Drug use: Never    Sexual activity: Defer        Past Medical History:   Diagnosis Date    Abnormal stress test     Anemia     IRON INFUSIONS WITH DIALYSIS    Anesthesia     WITH HIP REPLACEMENT DAUGHTER BELIEVES HAD SVT    Ankle pain, right     Anxiety and depression     CKD (chronic kidney disease), stage IV     DIALYSIS [id number] SHILEY IN RIGHT CHEST    Diabetes     Gout     High cholesterol     History of peritoneal dialysis     HL (hearing loss)     Hyperkalemia     Hypertension     Hypothyroidism     Insomnia     Night terrors     Psoriasis     Sleep walking     Thrombocytopenia     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    Vitiligo         Immunization History   Administered Date(s) Administered    Arexvy (RSV, Adults 60+ yrs) 2023    COVID-19 (PFIZER) BIVALENT 12+YRS 2022, 2023    COVID-19 (PFIZER) Purple Cap Monovalent 2021, 10/04/2021, 2022    COVID-19 F23 (PFIZER) 12YRS+ (COMIRNATY) 2023    Fluad Quad 65+ 2023    Fluzone High-Dose 65+yrs 2021    Hepatitis B 2 Dose Vaccine Heplisav-B 2023, 2023, 2023, 2023    Influenza, Unspecified 2021, 2022       No Known Allergies       Current Outpatient Medications:     allopurinol (ZYLOPRIM) 100 MG tablet, Take 1 tablet by mouth Daily. As needed, Disp: , Rfl:     ARIPiprazole  (ABILIFY) 2 MG tablet, Take 1 tablet by mouth 2 (Two) Times a Day., Disp: , Rfl:     aspirin 81 MG chewable tablet, Chew 1 tablet Daily., Disp: , Rfl:     atorvastatin (LIPITOR) 40 MG tablet, Take 1 tablet by mouth Daily., Disp: 90 tablet, Rfl: 1    Budeson-Glycopyrrol-Formoterol (BREZTRI) 160-9-4.8 MCG/ACT aerosol inhaler, Inhale 2 puffs 2 (Two) Times a Day., Disp: 1 each, Rfl: 5    carvedilol (COREG) 25 MG tablet, Take 1 tablet by mouth 2 (Two) Times a Day With Meals., Disp: , Rfl:     Insulin Glargine (LANTUS SOLOSTAR) 100 UNIT/ML injection pen, Inject 10 Units under the skin into the appropriate area as directed Every Night. (Patient taking differently: Inject 15 Units under the skin into the appropriate area as directed Every Night.), Disp: , Rfl:     losartan (COZAAR) 50 MG tablet, Take 1 tablet by mouth Every Night., Disp: 90 tablet, Rfl: 3    Methoxy PEG-Epoetin Beta (MIRCERA IJ), 30 mcg Every 14 (Fourteen) Days., Disp: , Rfl:     sertraline (ZOLOFT) 100 MG tablet, Take 1 tablet by mouth Every Night., Disp: 90 tablet, Rfl: 2    torsemide (DEMADEX) 100 MG tablet, Take 1 tablet by mouth Daily., Disp: 90 tablet, Rfl: 2    Turmeric (QC TUMERIC COMPLEX PO), Take 2 capsules by mouth 2 (Two) Times a Day., Disp: , Rfl:     clopidogrel (PLAVIX) 75 MG tablet, Take 1 tablet by mouth Daily. (Patient not taking: Reported on 6/6/2024), Disp: 90 tablet, Rfl: 3    colchicine 0.6 MG tablet, Take 1 tablet by mouth Every 12 (Twelve) Hours As Needed. (Patient not taking: Reported on 6/6/2024), Disp: , Rfl:     famotidine (Pepcid) 20 MG tablet, Take 1 tablet by mouth Daily As Needed. (Patient not taking: Reported on 6/6/2024), Disp: , Rfl:     ipratropium-albuterol (DUO-NEB) 0.5-2.5 mg/3 ml nebulizer, Take 3 mL by nebulization Every 4 (Four) Hours As Needed for Wheezing or Shortness of Air. Indications: Chronic Obstructive Lung Disease, J44.9 (Patient not taking: Reported on 6/6/2024), Disp: 360 mL, Rfl: 0    levothyroxine  "(SYNTHROID, LEVOTHROID) 150 MCG tablet, Take 1 tablet by mouth Every Morning for 30 days., Disp: 30 tablet, Rfl: 0    Risankizumab-rzaa (Skyrizi) 150 MG/ML solution prefilled syringe, Inject 150 mg under the skin into the appropriate area as directed Every 3 (Three) Months. (Patient not taking: Reported on 6/6/2024), Disp: , Rfl:      Objective     Vital Signs:   BP 91/70 (BP Location: Left arm, Patient Position: Sitting, Cuff Size: Adult)   Pulse 73   Temp 98.2 °F (36.8 °C) (Tympanic)   Resp 18   Ht 167.6 cm (66\")   Wt 72 kg (158 lb 11.2 oz)   SpO2 94% Comment: ROOM AIR  BMI 25.61 kg/m²     Physical Exam  Constitutional:       General: He is not in acute distress.     Appearance: Normal appearance. He is normal weight. He is not ill-appearing.   HENT:      Right Ear: Tympanic membrane and ear canal normal.      Left Ear: Tympanic membrane and ear canal normal.      Nose: Nose normal.      Mouth/Throat:      Mouth: Mucous membranes are moist.      Pharynx: Oropharynx is clear.   Eyes:      Extraocular Movements: Extraocular movements intact.      Conjunctiva/sclera: Conjunctivae normal.      Pupils: Pupils are equal, round, and reactive to light.   Cardiovascular:      Rate and Rhythm: Normal rate and regular rhythm.      Pulses: Normal pulses.      Heart sounds: Normal heart sounds.   Pulmonary:      Effort: Pulmonary effort is normal. No respiratory distress.      Breath sounds: No stridor. No wheezing, rhonchi or rales.      Comments: Slightly diminished bilateral bases  Abdominal:      General: Bowel sounds are normal.      Palpations: Abdomen is soft.   Musculoskeletal:         General: No swelling. Normal range of motion.      Cervical back: Normal range of motion and neck supple.      Right lower leg: No edema.      Left lower leg: No edema.   Skin:     General: Skin is warm and dry.   Neurological:      General: No focal deficit present.      Mental Status: He is alert and oriented to person, place, " and time.      Motor: No weakness.   Psychiatric:         Mood and Affect: Mood normal.         Behavior: Behavior normal.       Result Review :   I have personally reviewed patient's labs and images.  I also reviewed my last office note 5/3/2024.       Diagnoses and all orders for this visit:    1. Restrictive lung disease (Primary)    2. Multifocal pneumonia    3. Pleural effusion    4. Orthopnea    5. Dyspnea on exertion    6. Mucus plugging of bronchi    7. Recurrent aspiration pneumonia    8. End stage renal disease    9. Anemia due to stage 5 chronic kidney disease, not on chronic dialysis    10. Grade I diastolic dysfunction    11. Tobacco abuse, in remission      Impression and Plan    -ABGs 3/4/2024 pH 7.322 / pCO2 50.2 / pO2 259.6  -CT chest 2/18/2024 showed slight improvement in right lower lobe airspace consolidation, areas of groundglass opacity in the right mid and lower lung fields were stable.  -Echocardiogram 2/16/2024 EF 56%, left ventricular diastolic function consistent with grade 1 impaired relaxation.  Minimal aortic valve stenosis present.  Normal right ventricular size and systolic function.  -Repeat chest CT 5/24/2024 demonstrated significant improvement in bilateral airspace disease  -PFTs 5/13/2024 showed moderately severe restrictive defect, TLC 55% of predicted.  No significant response to bronchodilator.  DLCO severely reduced 31% of predicted.  -HRCT ordered to evaluate for ILD, scheduled for 8/23/2024.  Depending on results, may need ILD lab workup ordered.  -Continue using Breztri inhaler 2 puffs twice a day, instructed patient to rinse mouth after each use  -Continue using DuoNeb treatments as needed  -Continue taking Zyrtec for allergies  -Continue following up with nephrology for management of ESRD on hemodialysis, scheduled to see Dr. Mir with vascular surgery 7/1/2024 for possible fistula  -Follow up with Dr. Espinal in about 3 months after HRCT    Smoking status:  Reviewed  Vaccination status: Patient reports he is up-to-date with his flu, pneumonia, and Covid vaccines.  Patient is advised to continue to follow CDC recommendations such as social distancing wearing a mask and washing hands for at least 20 seconds.  Medications personally reviewed    Follow Up   No follow-ups on file.  Patient was given instructions and counseling regarding his condition or for health maintenance advice. Please see specific information pulled into the AVS if appropriate.

## 2024-06-06 ENCOUNTER — OFFICE VISIT (OUTPATIENT)
Dept: PULMONOLOGY | Facility: CLINIC | Age: 79
End: 2024-06-06
Payer: MEDICARE

## 2024-06-06 VITALS
BODY MASS INDEX: 25.51 KG/M2 | TEMPERATURE: 98.2 F | RESPIRATION RATE: 18 BRPM | HEIGHT: 66 IN | SYSTOLIC BLOOD PRESSURE: 91 MMHG | HEART RATE: 73 BPM | DIASTOLIC BLOOD PRESSURE: 70 MMHG | WEIGHT: 158.7 LBS | OXYGEN SATURATION: 94 %

## 2024-06-06 DIAGNOSIS — J69.0 RECURRENT ASPIRATION PNEUMONIA: ICD-10-CM

## 2024-06-06 DIAGNOSIS — J18.9 MULTIFOCAL PNEUMONIA: ICD-10-CM

## 2024-06-06 DIAGNOSIS — J90 PLEURAL EFFUSION: ICD-10-CM

## 2024-06-06 DIAGNOSIS — T17.500A MUCUS PLUGGING OF BRONCHI: ICD-10-CM

## 2024-06-06 DIAGNOSIS — R06.09 DYSPNEA ON EXERTION: ICD-10-CM

## 2024-06-06 DIAGNOSIS — N18.6 END STAGE RENAL DISEASE: ICD-10-CM

## 2024-06-06 DIAGNOSIS — F17.201 TOBACCO ABUSE, IN REMISSION: ICD-10-CM

## 2024-06-06 DIAGNOSIS — R06.01 ORTHOPNEA: ICD-10-CM

## 2024-06-06 DIAGNOSIS — I51.89 GRADE I DIASTOLIC DYSFUNCTION: ICD-10-CM

## 2024-06-06 DIAGNOSIS — D63.1 ANEMIA DUE TO STAGE 5 CHRONIC KIDNEY DISEASE, NOT ON CHRONIC DIALYSIS: ICD-10-CM

## 2024-06-06 DIAGNOSIS — J98.4 RESTRICTIVE LUNG DISEASE: Primary | ICD-10-CM

## 2024-06-06 DIAGNOSIS — N18.5 ANEMIA DUE TO STAGE 5 CHRONIC KIDNEY DISEASE, NOT ON CHRONIC DIALYSIS: ICD-10-CM

## 2024-06-10 ENCOUNTER — APPOINTMENT (OUTPATIENT)
Dept: CT IMAGING | Facility: HOSPITAL | Age: 79
End: 2024-06-10
Payer: MEDICARE

## 2024-06-10 ENCOUNTER — HOSPITAL ENCOUNTER (EMERGENCY)
Facility: HOSPITAL | Age: 79
Discharge: HOME | End: 2024-06-10
Attending: EMERGENCY MEDICINE
Payer: MEDICARE

## 2024-06-10 VITALS
DIASTOLIC BLOOD PRESSURE: 93 MMHG | WEIGHT: 176.37 LBS | RESPIRATION RATE: 16 BRPM | TEMPERATURE: 97.7 F | OXYGEN SATURATION: 95 % | HEIGHT: 66 IN | HEART RATE: 66 BPM | BODY MASS INDEX: 28.34 KG/M2 | SYSTOLIC BLOOD PRESSURE: 116 MMHG

## 2024-06-10 DIAGNOSIS — R06.83 SNORING: ICD-10-CM

## 2024-06-10 DIAGNOSIS — S09.90XA INJURY OF HEAD, INITIAL ENCOUNTER: Primary | ICD-10-CM

## 2024-06-10 DIAGNOSIS — R06.81 WITNESSED EPISODE OF APNEA: ICD-10-CM

## 2024-06-10 DIAGNOSIS — R29.818 SUSPECTED SLEEP APNEA: Primary | ICD-10-CM

## 2024-06-10 DIAGNOSIS — G47.19 EXCESSIVE DAYTIME SLEEPINESS: ICD-10-CM

## 2024-06-10 PROCEDURE — 70450 CT HEAD/BRAIN W/O DYE: CPT

## 2024-06-10 PROCEDURE — 99284 EMERGENCY DEPT VISIT MOD MDM: CPT

## 2024-06-10 NOTE — ED PROVIDER NOTES
Time: 2:58 PM EDT  Date of encounter:  6/10/2024  Independent Historian/Clinical History and Information was obtained by:   Patient    History is limited by: N/A    Chief Complaint: Head injury      History of Present Illness:  Patient is a 79 y.o. year old male who presents to the emergency department for evaluation of head injury.  The patient reports a mechanical fall where he struck his head and sustained a laceration.  Patient denies neck and back pain.  Patient has no chest pain or shortness of breath.  Patient has no subjective neurological doves including numbness or tingling.    HPI    Patient Care Team  Primary Care Provider: Domingo Bravo MD    Past Medical History:     No Known Allergies  Past Medical History:   Diagnosis Date    Abnormal stress test     Anemia     IRON INFUSIONS WITH DIALYSIS    Anesthesia     WITH HIP REPLACEMENT DAUGHTER BELIEVES HAD SVT    Ankle pain, right     Anxiety and depression     CKD (chronic kidney disease), stage IV     DIALYSIS [id number] SHILEY IN RIGHT CHEST    Diabetes     Gout     High cholesterol     History of peritoneal dialysis     HL (hearing loss)     Hyperkalemia     Hypertension     Hypothyroidism     Insomnia     Night terrors     Psoriasis     Sleep walking     Thrombocytopenia     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    Vitiligo      Past Surgical History:   Procedure Laterality Date    ANKLE SURGERY Right 11/1990    APPENDECTOMY N/A 1954    BRONCHOSCOPY N/A 03/01/2024    Procedure: BRONCHOSCOPY WITH BAL AND WASHINGS;  Surgeon: Sandra Espinal MD;  Location: [facility] ENDOSCOPY;  Service: Pulmonary;  Laterality: N/A;  PNEUMONIA    CARDIAC CATHETERIZATION N/A 5/29/2024    Procedure: Left Heart Cath with possible coronary angioplasty;  Surgeon: Stephan Nichols MD;  Location: [facility] CATH INVASIVE LOCATION;  Service: Cardiology;  Laterality: N/A;    COLONOSCOPY N/A 06/2022    [facility]    HIP BIPOLAR REPLACEMENT Right 01/2000    INSERTION  HEMODIALYSIS CATHETER Left 04/09/2024    Procedure: HEMODIALYSIS CATHETER INSERTION;  Surgeon: Jose Berry MD;  Location:  [facility] MAIN OR;  Service: General;  Laterality: Left;    INSERTION HEMODIALYSIS CATHETER N/A 04/12/2024    Procedure: Tunneled hemodialysis catheter insertion;  Surgeon: Enrique Vinson MD;  Location:  [facility];  Service: Vascular;  Laterality: N/A;    INSERTION PERITONEAL DIALYSIS CATHETER N/A 03/27/2023    Procedure: LAPAROSCOPIC INSERTION PERITONEAL DIALYSIS CATHETER, LAPAROSCOPIC OMENTOPEXY WITH LYSIS OF ADHESIONS;  Surgeon: Jose Berry MD;  Location:  [facility];  Service: General;  Laterality: N/A;    INSERTION PERITONEAL DIALYSIS CATHETER Left 07/23/2023    Procedure: REVISION OF PERITONEAL DIALYSIS CATHETER;  Surgeon: Radha Oreilly MD;  Location:  [facility];  Service: General;  Laterality: Left;    REMOVAL PERITONEAL DIALYSIS CATHETER N/A 04/09/2024    Procedure: REMOVAL PERITONEAL DIALYSIS CATHETER;  Surgeon: Jose Berry MD;  Location:  [facility];  Service: General;  Laterality: N/A;    RENAL BIOPSY Left 07/15/2022    WRIST SURGERY      UNSURE WHICH SIDE DAUGHTER REPORTS HAD SEVERE  CUT FROM WINDOW AND THEY HAD TO DO RECONSTRUCTIVE SURGERY WITH VESSELS AND NERVES     Family History   Problem Relation Age of Onset    Diabetes Mother     Heart disease Father     Cancer Sister 35    Diabetes Sister     Diabetes Brother     Sleep apnea Paternal Aunt     Heart disease Paternal Uncle     Sleep apnea Daughter     Malig Hyperthermia Neg Hx        Home Medications:  Prior to Admission medications    Medication Sig Start Date End Date Taking? Authorizing Provider   allopurinol (ZYLOPRIM) 100 MG tablet Take 1 tablet by mouth Daily. As needed 3/11/24   Provider, MD Serena   ARIPiprazole (ABILIFY) 2 MG tablet Take 1 tablet by mouth 2 (Two) Times a Day. 4/14/24   Cristian Santacruz MD   aspirin 81 MG chewable tablet Chew 1  tablet Daily. 3/11/24   Serena Matute MD   atorvastatin (LIPITOR) 40 MG tablet Take 1 tablet by mouth Daily. 4/29/24   Domingo Bravo MD   Budeson-Glycopyrrol-Formoterol (BREZTRI) 160-9-4.8 MCG/ACT aerosol inhaler Inhale 2 puffs 2 (Two) Times a Day. 5/3/24   Virginie Lopez APRN   carvedilol (COREG) 25 MG tablet Take 1 tablet by mouth 2 (Two) Times a Day With Meals.    Serena Matute MD   clopidogrel (PLAVIX) 75 MG tablet Take 1 tablet by mouth Daily.  Patient not taking: Reported on 6/6/2024 5/21/24   Aylin Nicole APRN   colchicine 0.6 MG tablet Take 1 tablet by mouth Every 12 (Twelve) Hours As Needed.  Patient not taking: Reported on 6/6/2024 4/25/24   Serena Matute MD   famotidine (Pepcid) 20 MG tablet Take 1 tablet by mouth Daily As Needed.  Patient not taking: Reported on 6/6/2024 3/11/24   Serena Matute MD   Insulin Glargine (LANTUS SOLOSTAR) 100 UNIT/ML injection pen Inject 10 Units under the skin into the appropriate area as directed Every Night.  Patient taking differently: Inject 15 Units under the skin into the appropriate area as directed Every Night. 4/14/24   Cristian Santacruz MD   ipratropium-albuterol (DUO-NEB) 0.5-2.5 mg/3 ml nebulizer Take 3 mL by nebulization Every 4 (Four) Hours As Needed for Wheezing or Shortness of Air. Indications: Chronic Obstructive Lung Disease, J44.9  Patient not taking: Reported on 6/6/2024 3/22/24   Janna Mo MD   levothyroxine (SYNTHROID, LEVOTHROID) 150 MCG tablet Take 1 tablet by mouth Every Morning for 30 days. 4/24/24 5/29/24  Domingo Bravo MD   losartan (COZAAR) 50 MG tablet Take 1 tablet by mouth Every Night. 5/24/24   Aylin Nicole APRN   Methoxy PEG-Epoetin Beta (MIRCERA IJ) 30 mcg Every 14 (Fourteen) Days. 4/18/24 4/17/25  Provider, MD Serena   Risankizumab-rzaa (Skyrizi) 150 MG/ML solution prefilled syringe Inject 150 mg under the skin into the appropriate area as directed  "Every 3 (Three) Months.  Patient not taking: Reported on 2024    ProviderSerena MD   sertraline (ZOLOFT) 100 MG tablet Take 1 tablet by mouth Every Night. 24   Domingo Bravo MD   torsemide (DEMADEX) 100 MG tablet Take 1 tablet by mouth Daily. 24   Domingo Bravo MD   Turmeric (QC TUMERIC COMPLEX PO) Take 2 capsules by mouth 2 (Two) Times a Day.    Provider, MD Serena        Social History:   Social History     Tobacco Use    Smoking status: Former     Current packs/day: 0.00     Average packs/day: 1 pack/day for 10.0 years (10.0 ttl pk-yrs)     Types: Cigarettes     Start date:      Quit date:      Years since quittin.4     Passive exposure: Past    Smokeless tobacco: Never    Tobacco comments:     quit 25 years ago, chews nicotine gum in past   Vaping Use    Vaping status: Never Used   Substance Use Topics    Alcohol use: Yes     Comment: 1 DRINK/DAY-rarely    Drug use: Never         Review of Systems:  Review of Systems   Constitutional:  Negative for chills and fever.   HENT:  Negative for congestion, rhinorrhea and sore throat.    Eyes:  Negative for pain and visual disturbance.   Respiratory:  Negative for apnea, cough, chest tightness and shortness of breath.    Cardiovascular:  Negative for chest pain and palpitations.   Gastrointestinal:  Negative for abdominal pain, diarrhea, nausea and vomiting.   Genitourinary:  Negative for difficulty urinating and dysuria.   Musculoskeletal:  Negative for joint swelling and myalgias.   Skin:  Negative for color change.   Neurological:  Negative for seizures and headaches.   Psychiatric/Behavioral: Negative.     All other systems reviewed and are negative.       Physical Exam:  /57 (BP Location: Left arm, Patient Position: Sitting)   Pulse 67   Temp 97.7 °F (36.5 °C) (Oral)   Resp 16   Ht 167.6 cm (66\")   Wt 80 kg (176 lb 5.9 oz)   SpO2 94%   BMI 28.47 kg/m²     Physical Exam  Vitals and nursing note reviewed. "   Constitutional:       General: He is not in acute distress.     Appearance: Normal appearance. He is not toxic-appearing.   HENT:      Head: Normocephalic.      Jaw: There is normal jaw occlusion.      Comments: (+) 3 cm laceration to right posterior temporal region  Eyes:      General: Lids are normal.      Extraocular Movements: Extraocular movements intact.      Conjunctiva/sclera: Conjunctivae normal.      Pupils: Pupils are equal, round, and reactive to light.   Cardiovascular:      Rate and Rhythm: Normal rate and regular rhythm.      Pulses: Normal pulses.      Heart sounds: Normal heart sounds.   Pulmonary:      Effort: Pulmonary effort is normal. No respiratory distress.      Breath sounds: Normal breath sounds. No wheezing or rhonchi.   Abdominal:      General: Abdomen is flat.      Palpations: Abdomen is soft.      Tenderness: There is no abdominal tenderness. There is no guarding or rebound.   Musculoskeletal:         General: Normal range of motion.      Cervical back: Normal range of motion and neck supple.      Right lower leg: No edema.      Left lower leg: No edema.   Skin:     General: Skin is warm and dry.   Neurological:      Mental Status: He is alert and oriented to person, place, and time. Mental status is at baseline.   Psychiatric:         Mood and Affect: Mood normal.                  Procedures:  Procedures      Medical Decision Making:      Comorbidities that affect care:    Chronic Kidney Disease    External Notes reviewed:    Previous Clinic Note: Patient was last seen for cardiac rehab.      The following orders were placed and all results were independently analyzed by me:  Orders Placed This Encounter   Procedures    CT Head Without Contrast       Medications Given in the Emergency Department:  Medications - No data to display     ED Course:         Labs:    Lab Results (last 24 hours)       ** No results found for the last 24 hours. **             Imaging:    CT Head Without  Contrast    Result Date: 6/10/2024  CT HEAD WO CONTRAST Date of Exam: 6/10/2024 2:09 PM EDT Indication: Headache headache. Comparison: CT brain dated 4/8/2024 Technique: Axial CT images were obtained of the head without contrast administration.  Reconstructed coronal and sagittal images were also obtained. Automated exposure control and iterative construction methods were used. Findings: There is no evidence of hemorrhage. There is no mass effect or midline shift. There is diffuse brain atrophy There is no extracerebral collection. Ventricles are normal in size and configuration for patient's stated age. Posterior fossa is within normal limits. Calvarium and skull base appear intact.  Visualized sinuses show no air fluid levels. Visualized orbits are unremarkable.     Impression: No acute intracranial abnormality Electronically Signed: Moses Bond MD  6/10/2024 2:36 PM EDT  Workstation ID: [id number]       Differential Diagnosis and Discussion:    Trauma:  Differential diagnosis considered but not limited to were subarachnoid hemorrhage, intracranial bleeding, pneumothorax, cardiac contusion, lung contusion, intra-abdominal bleeding, and compartment syndrome of any extremity or other significant traumatic pathology    CT scan radiology impression was interpreted by me.    MDM     The patient presents with an acute closed head injury. [place] Criteria for CT scan was followed. CT of the head is required for patients with minor head injury and any one of the following (including a GCS of 15):     1.                     Headache   2.                     Vomiting   3.                     Older than 60 years   4.                     Drug or Alcohol intoxication   5.                     Persistent anterograde amnesia   6.                     Visible trauma above the clavicle   7.                     Seizure.      The CT scan of the head shows no acute intracranial abnormalities. This includes subarachnoid  hemorrhage, subdural hematoma, epidural hematoma, or calvarial fractures. The patient is neurologically intact, has a normal mental status and is ambulatory in the ED. The patient has had no seizure like activity in the ED. The vital signs have been stable. The patient's condition is stable and appropriate for discharge. The patient made aware of the symptoms of post-concussive syndrome. The patient was counseled to return to the ED for motor or sensory deficit, altered mental status, uncontrollable headache, visual changes, seizures, or for any re-examination of new symptoms.     The patient presented with a laceration in need of repair. See laceration repair note for details. The wound was irrigated with copious normal saline irrigation.  4 staples were used to approximate the wound edges. Tetanus was not given. The patient tolerated the procedure well. Acute bleeding has ceased and the wound was approximated in the emergency department. Patient was counseled to keep the wound clean, dry, and out of the sun. Patient was counseled to change dressings daily. Patient was advised to return to the ED for worsening erythema, pain, swelling, fever, excessive drainage or signs of infection. They were counseled to follow up for suture removal as described in the discharge instructions. Patient verbalizes understanding and agrees to follow up as instructed.    The patient will pursue further outpatient evaluation with the primary care physician or other designated or consulting position as indicated in the discharge instructions as a follow up.            Patient Care Considerations:    None      Consultants/Shared Management Plan:    None    Social Determinants of Health:    Patient has presented with family members who are responsible, reliable and will ensure follow up care.      Disposition and Care Coordination:    Discharged: I considered escalation of care by admitting this patient to the hospital, however patient  has a normal neurological exam and is able to ambulate with no difficulty.    I have explained the patient´s condition, diagnoses and treatment plan based on the information available to me at this time. I have answered questions and addressed any concerns. The patient has a good  understanding of the patient´s diagnosis, condition, and treatment plan as can be expected at this point. The vital signs have been stable. The patient´s condition is stable and appropriate for discharge from the emergency department.      The patient will pursue further outpatient evaluation with the primary care physician or other designated or consulting physician as outlined in the discharge instructions. They are agreeable to this plan of care and follow-up instructions have been explained in detail. The patient has received these instructions in written format and has expressed an understanding of the discharge instructions. The patient is aware that any significant change in condition or worsening of symptoms should prompt an immediate return to this or the closest emergency department or call to 911.  I have explained discharge medications and the need for follow up with the patient/caretakers. This was also printed in the discharge instructions. Patient was discharged with the following medications and follow up:      Medication List        Changed      Insulin Glargine 100 UNIT/ML injection pen  Commonly known as: LANTUS SOLOSTAR  Inject 10 Units under the skin into the appropriate area as directed Every Night.  What changed: how much to take           Domingo Bravo MD  [address] [address]  [phone]    In 2 days         Final diagnoses:   Injury of head, initial encounter        ED Disposition       ED Disposition   Discharge    Condition   Stable    Comment   --               This medical record created using voice recognition software.             Erica Bain MD  06/10/24 [id number]

## 2024-06-13 NOTE — PROGRESS NOTES
Chief Complaint  ER FOLLOW UP (Patient had a fall, hit his head and has staples. Wants to see about getting staples removed) and Shortness of Breath (Patient states that he still has the shortness of breath. )    Subjective          Nelson Wang presents to [facility]  History of Present Illness    Here with daughter.  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    June 10th, seen in ER after a fall where he sustained a laceration to his scalp.  No LOC.  Had CT head that was reassuring.  Had 4 surgical staples placed, and he is here for their removal.    Since stopping skyrizi for treatment of his plaque psoriasis, he reports that his dyspnea has improved markedly.    BP elevated today.  Had losartan increased by cardiology per daughter's report, but due to hyperkalemia is was discontinued.  They will be discussing BP management with his nephrologist, Dr. Cha in the short term.    Current Outpatient Medications   Medication Instructions    allopurinol (ZYLOPRIM) 100 MG tablet 1 tablet, Oral, Daily, As needed    ARIPiprazole (ABILIFY) 2 mg, Oral, 2 Times Daily    aspirin 81 MG chewable tablet 1 tablet, Oral, Daily    atorvastatin (LIPITOR) 40 mg, Oral, Daily    Budeson-Glycopyrrol-Formoterol (BREZTRI) 160-9-4.8 MCG/ACT aerosol inhaler 2 puffs, Inhalation, 2 Times Daily    carvedilol (COREG) 25 mg, Oral, 2 Times Daily With Meals    clopidogrel (PLAVIX) 75 mg, Oral, Daily    colchicine 0.6 MG tablet 1 tablet, Every 12 Hours PRN    famotidine (PEPCID) 20 mg, Daily PRN    Insulin Glargine (LANTUS SOLOSTAR) 10 Units, Subcutaneous, Nightly    ipratropium-albuterol (DUO-NEB) 0.5-2.5 mg/3 ml nebulizer 3 mL, Nebulization, Every 4 Hours PRN    levothyroxine (SYNTHROID, LEVOTHROID) 150 mcg, Oral, Every Early Morning    Lokelma 10 g packet 1 packet, Oral    losartan (COZAAR) 50 mg, Oral, Nightly    Methoxy PEG-Epoetin Beta (MIRCERA IJ) 30 mcg, Every 14 Days     "sertraline (ZOLOFT) 100 mg, Oral, Nightly    sevelamer (RENVELA) 800 mg, Oral, 3 Times Daily With Meals    Skyrizi 150 mg, Every 3 Months    torsemide (DEMADEX) 100 mg, Oral, Daily    Turmeric (QC TUMERIC COMPLEX PO) 2 capsules, Oral, 2 Times Daily       The following portions of the patient's history were reviewed and updated as appropriate: allergies, current medications, past family history, past medical history, past social history, past surgical history, and problem list.    Objective   Vital Signs:   /58 (BP Location: Right arm, Patient Position: Sitting, Cuff Size: Adult)   Pulse 78   Temp 98.5 °F (36.9 °C) (Temporal)   Ht 167.6 cm (66\")   Wt 74.6 kg (164 lb 6.4 oz)   SpO2 91%   BMI 26.53 kg/m²     BP Readings from Last 3 Encounters:   06/17/24 144/58   06/10/24 116/93   06/06/24 91/70     Wt Readings from Last 3 Encounters:   06/17/24 74.6 kg (164 lb 6.4 oz)   06/10/24 80 kg (176 lb 5.9 oz)   06/06/24 72 kg (158 lb 11.2 oz)           Physical Exam  Vitals reviewed.   Constitutional:       General: He is not in acute distress.     Appearance: Normal appearance. He is not ill-appearing, toxic-appearing or diaphoretic.   HENT:      Head: Normocephalic and atraumatic.      Right Ear: External ear normal.      Left Ear: External ear normal.   Eyes:      Conjunctiva/sclera: Conjunctivae normal.   Cardiovascular:      Rate and Rhythm: Normal rate and regular rhythm.      Pulses: Normal pulses.      Heart sounds: Normal heart sounds. No murmur heard.     No friction rub. No gallop.   Pulmonary:      Effort: Pulmonary effort is normal. No respiratory distress.      Breath sounds: Normal breath sounds. No stridor. No wheezing, rhonchi or rales.   Chest:      Chest wall: No tenderness.   Abdominal:      General: Abdomen is flat.      Palpations: Abdomen is soft. There is no mass.      Tenderness: There is no abdominal tenderness.   Musculoskeletal:      Right lower leg: No edema.      Left lower leg: No " edema.   Skin:     General: Skin is warm and dry.      Comments: 4 surgical staples noted.  Removed today.  Well healed wound on right side of scalp (approximately 7.5 cm in length).   Neurological:      General: No focal deficit present.      Mental Status: He is alert. Mental status is at baseline.   Psychiatric:         Mood and Affect: Mood normal.         Behavior: Behavior normal.         Thought Content: Thought content normal.         Judgment: Judgment normal.       Procedure: Staple removal  No anesthetic used  Using a standard surgical staple removal tool, removed 4 staples from well healed head laceration  No blood or oozing noted  Dr. Wang tolerated procedure well and without issue    Result Review :   The following data was reviewed by: Domingo Bravo MD on 06/17/2024:  Common labs          6/6/2024    00:00 6/8/2024    00:00 6/13/2024    00:00   Common Labs   Potassium  6.2     5.0       Calcium 9.3         Hemoglobin 11.1        33.3      11.3        33.9          Details          This result is from an external source.                    Lab Results   Component Value Date    COVID19 Not Detected 04/08/2024    RSV Not Detected 04/08/2024    INR 0.96 05/28/2024    BILIRUBINUR Negative 04/09/2024       Procedures        Assessment and Plan    Diagnoses and all orders for this visit:    1. Injury of head, subsequent encounter (Primary)    2. Essential hypertension    3. ESRD (end stage renal disease) on dialysis    4. Fall, subsequent encounter    5. Shortness of breath    6. Encounter for staple removal    7. 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       Follow Up   Return if symptoms worsen or fail to improve.  Will keep previously scheduled 7/17/24 appointment for follow up  Patient was given instructions and counseling regarding his condition or for health maintenance advice. Please see specific information pulled into the AVS if appropriate.       Domingo Bravo MD  06/17/24  16:58 EDT

## 2024-06-17 ENCOUNTER — OFFICE VISIT (OUTPATIENT)
Dept: INTERNAL MEDICINE | Facility: CLINIC | Age: 79
End: 2024-06-17
Payer: MEDICARE

## 2024-06-17 VITALS
SYSTOLIC BLOOD PRESSURE: 144 MMHG | DIASTOLIC BLOOD PRESSURE: 58 MMHG | OXYGEN SATURATION: 91 % | HEIGHT: 66 IN | TEMPERATURE: 98.5 F | HEART RATE: 78 BPM | WEIGHT: 164.4 LBS | BODY MASS INDEX: 26.42 KG/M2

## 2024-06-17 DIAGNOSIS — N18.6 ESRD (END STAGE RENAL DISEASE) ON DIALYSIS: ICD-10-CM

## 2024-06-17 DIAGNOSIS — I10 ESSENTIAL HYPERTENSION: ICD-10-CM

## 2024-06-17 DIAGNOSIS — R06.02 SHORTNESS OF BREATH: ICD-10-CM

## 2024-06-17 DIAGNOSIS — Z48.02 ENCOUNTER FOR STAPLE REMOVAL: ICD-10-CM

## 2024-06-17 DIAGNOSIS — L40.0 PLAQUE PSORIASIS: ICD-10-CM

## 2024-06-17 DIAGNOSIS — S09.90XD INJURY OF HEAD, SUBSEQUENT ENCOUNTER: Primary | ICD-10-CM

## 2024-06-17 DIAGNOSIS — Z99.2 ESRD (END STAGE RENAL DISEASE) ON DIALYSIS: ICD-10-CM
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
## 2024-06-17 PROCEDURE — 3078F DIAST BP <80 MM HG: CPT | Performed by: STUDENT IN AN ORGANIZED HEALTH CARE EDUCATION/TRAINING PROGRAM

## 2024-06-17 PROCEDURE — 15854 REMOVAL SUTR&STAPL XREQ ANES: CPT | Performed by: STUDENT IN AN ORGANIZED HEALTH CARE EDUCATION/TRAINING PROGRAM

## 2024-06-17 PROCEDURE — 1126F AMNT PAIN NOTED NONE PRSNT: CPT | Performed by: STUDENT IN AN ORGANIZED HEALTH CARE EDUCATION/TRAINING PROGRAM

## 2024-06-17 PROCEDURE — G2211 COMPLEX E/M VISIT ADD ON: HCPCS | Performed by: STUDENT IN AN ORGANIZED HEALTH CARE EDUCATION/TRAINING PROGRAM

## 2024-06-17 PROCEDURE — 99214 OFFICE O/P EST MOD 30 MIN: CPT | Performed by: STUDENT IN AN ORGANIZED HEALTH CARE EDUCATION/TRAINING PROGRAM

## 2024-06-17 PROCEDURE — 3077F SYST BP >= 140 MM HG: CPT | Performed by: STUDENT IN AN ORGANIZED HEALTH CARE EDUCATION/TRAINING PROGRAM

## 2024-06-17 RX ORDER — SODIUM ZIRCONIUM CYCLOSILICATE 10 G/10G
1 POWDER, FOR SUSPENSION ORAL
COMMUNITY
Start: 2024-06-11

## 2024-06-17 RX ORDER — SEVELAMER CARBONATE 800 MG/1
800 TABLET, FILM COATED ORAL
COMMUNITY
Start: 2024-06-15

## 2024-06-28 ENCOUNTER — HOSPITAL ENCOUNTER (OUTPATIENT)
Dept: SLEEP MEDICINE | Facility: HOSPITAL | Age: 79
Discharge: HOME | End: 2024-06-28
Admitting: FAMILY MEDICINE
Payer: MEDICARE

## 2024-06-28 DIAGNOSIS — R29.818 SUSPECTED SLEEP APNEA: ICD-10-CM

## 2024-06-28 DIAGNOSIS — G47.19 EXCESSIVE DAYTIME SLEEPINESS: ICD-10-CM

## 2024-06-28 DIAGNOSIS — R06.81 WITNESSED EPISODE OF APNEA: ICD-10-CM

## 2024-06-28 DIAGNOSIS — R06.83 SNORING: ICD-10-CM

## 2024-06-28 PROCEDURE — 95806 SLEEP STUDY UNATT&RESP EFFT: CPT

## 2024-07-01 ENCOUNTER — HOSPITAL ENCOUNTER (OUTPATIENT)
Dept: CARDIOLOGY | Facility: HOSPITAL | Age: 79
Discharge: HOME | End: 2024-07-01
Payer: MEDICARE

## 2024-07-01 ENCOUNTER — OFFICE VISIT (OUTPATIENT)
Dept: VASCULAR SURGERY | Facility: HOSPITAL | Age: 79
End: 2024-07-01
Payer: MEDICARE

## 2024-07-01 VITALS
HEART RATE: 74 BPM | DIASTOLIC BLOOD PRESSURE: 95 MMHG | OXYGEN SATURATION: 91 % | TEMPERATURE: 97.3 F | SYSTOLIC BLOOD PRESSURE: 200 MMHG | RESPIRATION RATE: 18 BRPM

## 2024-07-01 DIAGNOSIS — N18.6 ESRD (END STAGE RENAL DISEASE): ICD-10-CM

## 2024-07-01 DIAGNOSIS — Z99.2 ESRD ON DIALYSIS: Primary | ICD-10-CM

## 2024-07-01 DIAGNOSIS — N18.6 ESRD ON DIALYSIS: Primary | ICD-10-CM

## 2024-07-01 LAB
BH CV UPPER VENOUS LEFT BASILIC FOREARM COMPRESS: NORMAL
BH CV UPPER VENOUS LEFT BASILIC UPPER COMPRESS: NORMAL
BH CV UPPER VENOUS LEFT CEPHALIC FOREARM COMPRESS: NORMAL
BH CV UPPER VENOUS LEFT CEPHALIC UPPER COMPRESS: NORMAL
BH CV UPPER VENOUS RIGHT BASILIC FOREARM COMPRESS: NORMAL
BH CV UPPER VENOUS RIGHT BASILIC UPPER COMPRESS: NORMAL
BH CV UPPER VENOUS RIGHT CEPHALIC FOREARM COMPRESS: NORMAL
BH CV UPPER VENOUS RIGHT CEPHALIC UPPER COMPRESS: NORMAL
BH CV VAS MEAS BASILIC ANTECUBITAL FOSSA LEFT: 0.22 CM
BH CV VAS MEAS BASILIC ANTECUBITAL FOSSA RIGHT: 0.1 CM
BH CV VAS MEAS BASILIC FOREARM LEFT - MID: 0.11 CM
BH CV VAS MEAS BASILIC FOREARM LEFT - PROX: 0.13 CM
BH CV VAS MEAS BASILIC UPPER ARM LEFT - DIST: 0.22 CM
BH CV VAS MEAS BASILIC UPPER ARM LEFT - MID: 0.42 CM
BH CV VAS MEAS BASILIC UPPER ARM LEFT - PROX: 0.36 CM
BH CV VAS MEAS BASILIC UPPER ARM RIGHT - DIST: 0.28 CM
BH CV VAS MEAS BASILIC UPPER ARM RIGHT - MID: 0.26 CM
BH CV VAS MEAS BASILIC UPPER ARM RIGHT - PROX: 0.55 CM
BH CV VAS MEAS CEPHALIC ANTECUBITAL FOSSA LEFT: 0.37 CM
BH CV VAS MEAS CEPHALIC ANTECUBITAL FOSSA RIGHT: 0.38 CM
BH CV VAS MEAS CEPHALIC FOREARM LEFT - DIST: 0.13 CM
BH CV VAS MEAS CEPHALIC FOREARM LEFT - MID: 0.14 CM
BH CV VAS MEAS CEPHALIC FOREARM LEFT - PROX: 0.24 CM
BH CV VAS MEAS CEPHALIC FOREARM RIGHT - DIST: 0.16 CM
BH CV VAS MEAS CEPHALIC FOREARM RIGHT - MID: 0.14 CM
BH CV VAS MEAS CEPHALIC FOREARM RIGHT - PROX: 0.52 CM
BH CV VAS MEAS CEPHALIC UPPER ARM LEFT - DIST: 0.33 CM
BH CV VAS MEAS CEPHALIC UPPER ARM LEFT - MID: 0.33 CM
BH CV VAS MEAS CEPHALIC UPPER ARM LEFT - PROX: 0.2 CM
BH CV VAS MEAS CEPHALIC UPPER ARM RIGHT - DIST: 0.44 CM
BH CV VAS MEAS CEPHALIC UPPER ARM RIGHT - MID: 0.3 CM
BH CV VAS MEAS CEPHALIC UPPER ARM RIGHT - PROX: 0.28 CM

## 2024-07-01 PROCEDURE — 1159F MED LIST DOCD IN RCRD: CPT | Performed by: SURGERY

## 2024-07-01 PROCEDURE — 93985 DUP-SCAN HEMO COMPL BI STD: CPT | Performed by: SURGERY

## 2024-07-01 PROCEDURE — 1160F RVW MEDS BY RX/DR IN RCRD: CPT | Performed by: SURGERY

## 2024-07-01 PROCEDURE — 3077F SYST BP >= 140 MM HG: CPT | Performed by: SURGERY

## 2024-07-01 PROCEDURE — 93985 DUP-SCAN HEMO COMPL BI STD: CPT

## 2024-07-01 PROCEDURE — 3079F DIAST BP 80-89 MM HG: CPT | Performed by: SURGERY

## 2024-07-01 PROCEDURE — 99214 OFFICE O/P EST MOD 30 MIN: CPT | Performed by: SURGERY

## 2024-07-01 NOTE — PROGRESS NOTES
[facility]   HISTORY AND PHYSICAL    Patient Name: Nelson Wang  : 1945  MRN: [id number]  Primary Care Physician:  Domingo Bravo MD  Date of admission: (Not on file)    Subjective   Subjective     Chief Complaint: Need for permanent access for hemodialysis    HPI:    Nelson Wang is a 79 y.o. male with previous history of peritoneal dialysis for 2 years who initiated hemodialysis via a right chest catheter in April of this year.  He is right-handed.  He comes to see me to discuss about permanent access.  He takes dialysis currently on a Tuesday, Thursday and Saturday schedule.    Review of Systems    Non contributory except for the History of Present Illness    Personal History     Past Medical History:   Diagnosis Date    Abnormal stress test     Anemia     IRON INFUSIONS WITH DIALYSIS    Anesthesia     WITH HIP REPLACEMENT DAUGHTER BELIEVES HAD SVT    Ankle pain, right     Anxiety and depression     CKD (chronic kidney disease), stage IV     DIALYSIS [id number] SHILEY IN RIGHT CHEST    Diabetes     Gout     High cholesterol     History of peritoneal dialysis     HL (hearing loss)     Hyperkalemia     Hypertension     Hypothyroidism     Insomnia     Night terrors     Psoriasis     Sleep walking     Thrombocytopenia     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    Vitiligo        Past Surgical History:   Procedure Laterality Date    ANKLE SURGERY Right 1990    APPENDECTOMY N/A     BRONCHOSCOPY N/A 2024    Procedure: BRONCHOSCOPY WITH BAL AND WASHINGS;  Surgeon: Sandra Espinal MD;  Location: [facility] ENDOSCOPY;  Service: Pulmonary;  Laterality: N/A;  PNEUMONIA    CARDIAC CATHETERIZATION N/A 2024    Procedure: Left Heart Cath with possible coronary angioplasty;  Surgeon: Stephan Nichols MD;  Location: [facility] CATH INVASIVE LOCATION;  Service: Cardiology;  Laterality: N/A;    COLONOSCOPY N/A 2022    [facility]    HIP BIPOLAR REPLACEMENT Right 2000    INSERTION  HEMODIALYSIS CATHETER Left 04/09/2024    Procedure: HEMODIALYSIS CATHETER INSERTION;  Surgeon: Jose Berry MD;  [facility];  Service: General;  Laterality: Left;    INSERTION HEMODIALYSIS CATHETER N/A 04/12/2024    Procedure: Tunneled hemodialysis catheter insertion;  Surgeon: Enrique Vinson MD;  [facility];  Service: Vascular;  Laterality: N/A;    INSERTION PERITONEAL DIALYSIS CATHETER N/A 03/27/2023    Procedure: LAPAROSCOPIC INSERTION PERITONEAL DIALYSIS CATHETER, LAPAROSCOPIC OMENTOPEXY WITH LYSIS OF ADHESIONS;  Surgeon: Jose Berry MD;  [facility];  Service: General;  Laterality: N/A;    INSERTION PERITONEAL DIALYSIS CATHETER Left 07/23/2023    Procedure: REVISION OF PERITONEAL DIALYSIS CATHETER;  Surgeon: Radha Oreilly MD;  [facility];  Service: General;  Laterality: Left;    REMOVAL PERITONEAL DIALYSIS CATHETER N/A 04/09/2024    Procedure: REMOVAL PERITONEAL DIALYSIS CATHETER;  Surgeon: Jose Berry MD;  [facility];  Service: General;  Laterality: N/A;    RENAL BIOPSY Left 07/15/2022    WRIST SURGERY      UNSURE WHICH SIDE DAUGHTER REPORTS HAD SEVERE  CUT FROM WINDOW AND THEY HAD TO DO RECONSTRUCTIVE SURGERY WITH VESSELS AND NERVES       Family History: family history includes Cancer (age of onset: 35) in his sister; Diabetes in his brother, mother, and sister; Heart disease in his father and paternal uncle; Sleep apnea in his daughter and paternal aunt. Otherwise pertinent FHx was reviewed and not pertinent to current issue.    Social History:  reports that he quit smoking about 24 years ago. His smoking use included cigarettes. He started smoking about 34 years ago. He has a 10 pack-year smoking history. He has been exposed to tobacco smoke. He has never used smokeless tobacco. He reports current alcohol use. He reports that he does not use drugs.    Home Medications:  Current Outpatient  Medications on File Prior to Visit   Medication Sig    allopurinol (ZYLOPRIM) 100 MG tablet Take 1 tablet by mouth Daily. As needed    ARIPiprazole (ABILIFY) 2 MG tablet Take 1 tablet by mouth 2 (Two) Times a Day.    aspirin 81 MG chewable tablet Chew 1 tablet Daily.    atorvastatin (LIPITOR) 40 MG tablet Take 1 tablet by mouth Daily.    Budeson-Glycopyrrol-Formoterol (BREZTRI) 160-9-4.8 MCG/ACT aerosol inhaler Inhale 2 puffs 2 (Two) Times a Day.    carvedilol (COREG) 25 MG tablet Take 1 tablet by mouth 2 (Two) Times a Day With Meals.    Insulin Glargine (LANTUS SOLOSTAR) 100 UNIT/ML injection pen Inject 10 Units under the skin into the appropriate area as directed Every Night.    Lokelma 10 g packet Take 10 g by mouth.    losartan (COZAAR) 50 MG tablet Take 1 tablet by mouth Every Night.    Methoxy PEG-Epoetin Beta (MIRCERA IJ) 30 mcg Every 14 (Fourteen) Days.    sertraline (ZOLOFT) 100 MG tablet Take 1 tablet by mouth Every Night.    sevelamer (RENVELA) 800 MG tablet Take 1 tablet by mouth 3 (Three) Times a Day With Meals.    torsemide (DEMADEX) 100 MG tablet Take 1 tablet by mouth Daily.    Turmeric (QC TUMERIC COMPLEX PO) Take 2 capsules by mouth 2 (Two) Times a Day.    clopidogrel (PLAVIX) 75 MG tablet Take 1 tablet by mouth Daily. (Patient not taking: Reported on 6/6/2024)    colchicine 0.6 MG tablet Take 1 tablet by mouth Every 12 (Twelve) Hours As Needed. (Patient not taking: Reported on 6/6/2024)    famotidine (Pepcid) 20 MG tablet Take 1 tablet by mouth Daily As Needed. (Patient not taking: Reported on 6/6/2024)    ipratropium-albuterol (DUO-NEB) 0.5-2.5 mg/3 ml nebulizer Take 3 mL by nebulization Every 4 (Four) Hours As Needed for Wheezing or Shortness of Air. Indications: Chronic Obstructive Lung Disease, J44.9 (Patient not taking: Reported on 6/6/2024)    levothyroxine (SYNTHROID, LEVOTHROID) 150 MCG tablet Take 1 tablet by mouth Every Morning for 30 days.    Risankizumab-rzaa (Skyrizi) 150 MG/ML  solution prefilled syringe Inject 150 mg under the skin into the appropriate area as directed Every 3 (Three) Months. (Patient not taking: Reported on 6/6/2024)     No current facility-administered medications on file prior to visit.          Allergies:  No Known Allergies    Objective   Objective     Vitals:   Temp:  [97.3 °F (36.3 °C)] 97.3 °F (36.3 °C)  Heart Rate:  [74] 74  Resp:  [18] 18  BP: (187-200)/(85-95) 200/95    Physical Exam    General: Awake, alert, NAD   Eyes:  ANUM   Neck: Supple   Lungs: Clear   Heart: RRR   Abdomen: benign   Musculoskeletal:  normal motor tone, symmetric   Skin: warm, normal turgor   Neuro: strength 5/5 all extremities   Pulses: +2 bilateral radial pulses.    Diagnostic studies:   A vein mapping ultrasound in the office today demonstrates what appears to be a reasonable left upper arm cephalic as well as basilic veins.    Assessment & Plan   Assessment / Plan     Active Hospital Problems:  There are no active hospital problems to display for this patient.      Diagnoses and all orders for this visit:    1. 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  The plan is for creation of a left brachiocephalic arteriovenous fistula.  I have discussed with him in detail the mechanics of the procedure, the indications, benefits, risks, alternatives, as well as potential complications to include but not limited to infection, bleeding, reoperation, failure of the fistula to develop.  He appears to  understand and desires to proceed.      Electronically signed by Moses Mir MD, 07/01/24, 2:13 PM EDT.

## 2024-07-01 NOTE — H&P (VIEW-ONLY)
[facility]   HISTORY AND PHYSICAL    Patient Name: Nelson Wang  : 1945  MRN: [id number]  Primary Care Physician:  Domingo Bravo MD  Date of admission: (Not on file)    Subjective   Subjective     Chief Complaint: Need for permanent access for hemodialysis    HPI:    Nelson Wang is a 79 y.o. male with previous history of peritoneal dialysis for 2 years who initiated hemodialysis via a right chest catheter in April of this year.  He is right-handed.  He comes to see me to discuss about permanent access.  He takes dialysis currently on a Tuesday, Thursday and Saturday schedule.    Review of Systems    Non contributory except for the History of Present Illness    Personal History     Past Medical History:   Diagnosis Date    Abnormal stress test     Anemia     IRON INFUSIONS WITH DIALYSIS    Anesthesia     WITH HIP REPLACEMENT DAUGHTER BELIEVES HAD SVT    Ankle pain, right     Anxiety and depression     CKD (chronic kidney disease), stage IV     DIALYSIS [id number] SHILEY IN RIGHT CHEST    Diabetes     Gout     High cholesterol     History of peritoneal dialysis     HL (hearing loss)     Hyperkalemia     Hypertension     Hypothyroidism     Insomnia     Night terrors     Psoriasis     Sleep walking     Thrombocytopenia     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    Vitiligo        Past Surgical History:   Procedure Laterality Date    ANKLE SURGERY Right 1990    APPENDECTOMY N/A     BRONCHOSCOPY N/A 2024    Procedure: BRONCHOSCOPY WITH BAL AND WASHINGS;  Surgeon: Sandra Espinal MD;  Location: [facility] ENDOSCOPY;  Service: Pulmonary;  Laterality: N/A;  PNEUMONIA    CARDIAC CATHETERIZATION N/A 2024    Procedure: Left Heart Cath with possible coronary angioplasty;  Surgeon: Stephan Nichols MD;  Location: [facility] CATH INVASIVE LOCATION;  Service: Cardiology;  Laterality: N/A;    COLONOSCOPY N/A 2022    [facility]    HIP BIPOLAR REPLACEMENT Right 2000    INSERTION  HEMODIALYSIS CATHETER Left 04/09/2024    Procedure: HEMODIALYSIS CATHETER INSERTION;  Surgeon: Jose Berry MD;  [facility];  Service: General;  Laterality: Left;    INSERTION HEMODIALYSIS CATHETER N/A 04/12/2024    Procedure: Tunneled hemodialysis catheter insertion;  Surgeon: Enrique Vinson MD;  [facility];  Service: Vascular;  Laterality: N/A;    INSERTION PERITONEAL DIALYSIS CATHETER N/A 03/27/2023    Procedure: LAPAROSCOPIC INSERTION PERITONEAL DIALYSIS CATHETER, LAPAROSCOPIC OMENTOPEXY WITH LYSIS OF ADHESIONS;  Surgeon: Jose Berry MD;  [facility];  Service: General;  Laterality: N/A;    INSERTION PERITONEAL DIALYSIS CATHETER Left 07/23/2023    Procedure: REVISION OF PERITONEAL DIALYSIS CATHETER;  Surgeon: Radha Oreilly MD;  [facility];  Service: General;  Laterality: Left;    REMOVAL PERITONEAL DIALYSIS CATHETER N/A 04/09/2024    Procedure: REMOVAL PERITONEAL DIALYSIS CATHETER;  Surgeon: Jose Berry MD;  [facility];  Service: General;  Laterality: N/A;    RENAL BIOPSY Left 07/15/2022    WRIST SURGERY      UNSURE WHICH SIDE DAUGHTER REPORTS HAD SEVERE  CUT FROM WINDOW AND THEY HAD TO DO RECONSTRUCTIVE SURGERY WITH VESSELS AND NERVES       Family History: family history includes Cancer (age of onset: 35) in his sister; Diabetes in his brother, mother, and sister; Heart disease in his father and paternal uncle; Sleep apnea in his daughter and paternal aunt. Otherwise pertinent FHx was reviewed and not pertinent to current issue.    Social History:  reports that he quit smoking about 24 years ago. His smoking use included cigarettes. He started smoking about 34 years ago. He has a 10 pack-year smoking history. He has been exposed to tobacco smoke. He has never used smokeless tobacco. He reports current alcohol use. He reports that he does not use drugs.    Home Medications:  Current Outpatient  Medications on File Prior to Visit   Medication Sig    allopurinol (ZYLOPRIM) 100 MG tablet Take 1 tablet by mouth Daily. As needed    ARIPiprazole (ABILIFY) 2 MG tablet Take 1 tablet by mouth 2 (Two) Times a Day.    aspirin 81 MG chewable tablet Chew 1 tablet Daily.    atorvastatin (LIPITOR) 40 MG tablet Take 1 tablet by mouth Daily.    Budeson-Glycopyrrol-Formoterol (BREZTRI) 160-9-4.8 MCG/ACT aerosol inhaler Inhale 2 puffs 2 (Two) Times a Day.    carvedilol (COREG) 25 MG tablet Take 1 tablet by mouth 2 (Two) Times a Day With Meals.    Insulin Glargine (LANTUS SOLOSTAR) 100 UNIT/ML injection pen Inject 10 Units under the skin into the appropriate area as directed Every Night.    Lokelma 10 g packet Take 10 g by mouth.    losartan (COZAAR) 50 MG tablet Take 1 tablet by mouth Every Night.    Methoxy PEG-Epoetin Beta (MIRCERA IJ) 30 mcg Every 14 (Fourteen) Days.    sertraline (ZOLOFT) 100 MG tablet Take 1 tablet by mouth Every Night.    sevelamer (RENVELA) 800 MG tablet Take 1 tablet by mouth 3 (Three) Times a Day With Meals.    torsemide (DEMADEX) 100 MG tablet Take 1 tablet by mouth Daily.    Turmeric (QC TUMERIC COMPLEX PO) Take 2 capsules by mouth 2 (Two) Times a Day.    clopidogrel (PLAVIX) 75 MG tablet Take 1 tablet by mouth Daily. (Patient not taking: Reported on 6/6/2024)    colchicine 0.6 MG tablet Take 1 tablet by mouth Every 12 (Twelve) Hours As Needed. (Patient not taking: Reported on 6/6/2024)    famotidine (Pepcid) 20 MG tablet Take 1 tablet by mouth Daily As Needed. (Patient not taking: Reported on 6/6/2024)    ipratropium-albuterol (DUO-NEB) 0.5-2.5 mg/3 ml nebulizer Take 3 mL by nebulization Every 4 (Four) Hours As Needed for Wheezing or Shortness of Air. Indications: Chronic Obstructive Lung Disease, J44.9 (Patient not taking: Reported on 6/6/2024)    levothyroxine (SYNTHROID, LEVOTHROID) 150 MCG tablet Take 1 tablet by mouth Every Morning for 30 days.    Risankizumab-rzaa (Skyrizi) 150 MG/ML  solution prefilled syringe Inject 150 mg under the skin into the appropriate area as directed Every 3 (Three) Months. (Patient not taking: Reported on 6/6/2024)     No current facility-administered medications on file prior to visit.          Allergies:  No Known Allergies    Objective   Objective     Vitals:   Temp:  [97.3 °F (36.3 °C)] 97.3 °F (36.3 °C)  Heart Rate:  [74] 74  Resp:  [18] 18  BP: (187-200)/(85-95) 200/95    Physical Exam    General: Awake, alert, NAD   Eyes:  ANUM   Neck: Supple   Lungs: Clear   Heart: RRR   Abdomen: benign   Musculoskeletal:  normal motor tone, symmetric   Skin: warm, normal turgor   Neuro: strength 5/5 all extremities   Pulses: +2 bilateral radial pulses.    Diagnostic studies:   A vein mapping ultrasound in the office today demonstrates what appears to be a reasonable left upper arm cephalic as well as basilic veins.    Assessment & Plan   Assessment / Plan     Active Hospital Problems:  There are no active hospital problems to display for this patient.      Diagnoses and all orders for this visit:    1. 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  The plan is for creation of a left brachiocephalic arteriovenous fistula.  I have discussed with him in detail the mechanics of the procedure, the indications, benefits, risks, alternatives, as well as potential complications to include but not limited to infection, bleeding, reoperation, failure of the fistula to develop.  He appears to  understand and desires to proceed.      Electronically signed by Moses Mir MD, 07/01/24, 2:13 PM EDT.

## 2024-07-08 DIAGNOSIS — G47.33 SEVERE OBSTRUCTIVE SLEEP APNEA: Primary | ICD-10-CM

## 2024-07-08 PROCEDURE — 95806 SLEEP STUDY UNATT&RESP EFFT: CPT | Performed by: FAMILY MEDICINE

## 2024-07-10 ENCOUNTER — OFFICE VISIT (OUTPATIENT)
Dept: PODIATRY | Facility: CLINIC | Age: 79
End: 2024-07-10
Payer: MEDICARE

## 2024-07-10 VITALS
BODY MASS INDEX: 26.03 KG/M2 | SYSTOLIC BLOOD PRESSURE: 145 MMHG | TEMPERATURE: 97.8 F | DIASTOLIC BLOOD PRESSURE: 73 MMHG | OXYGEN SATURATION: 95 % | HEIGHT: 66 IN | HEART RATE: 78 BPM | WEIGHT: 162 LBS

## 2024-07-10 DIAGNOSIS — M12.571 TRAUMATIC ARTHRITIS OF ANKLE, RIGHT: ICD-10-CM

## 2024-07-10 DIAGNOSIS — E11.8 DIABETIC FOOT: ICD-10-CM

## 2024-07-10 DIAGNOSIS — M79.671 FOOT PAIN, BILATERAL: Primary | ICD-10-CM

## 2024-07-10 DIAGNOSIS — L60.0 ONYCHOCRYPTOSIS: ICD-10-CM

## 2024-07-10 DIAGNOSIS — M79.671 FOOT PAIN, RIGHT: ICD-10-CM

## 2024-07-10 DIAGNOSIS — B35.1 ONYCHOMYCOSIS: ICD-10-CM

## 2024-07-10 DIAGNOSIS — Z99.2 DIALYSIS PATIENT: ICD-10-CM

## 2024-07-10 DIAGNOSIS — E11.9 NON-INSULIN DEPENDENT TYPE 2 DIABETES MELLITUS: ICD-10-CM

## 2024-07-10 DIAGNOSIS — M79.672 FOOT PAIN, BILATERAL: Primary | ICD-10-CM

## 2024-07-10 RX ORDER — BUPIVACAINE HYDROCHLORIDE 5 MG/ML
0.5 INJECTION, SOLUTION PERINEURAL ONCE
Status: COMPLETED | OUTPATIENT
Start: 2024-07-10 | End: 2024-07-10

## 2024-07-10 RX ORDER — TESTOSTERONE CYPIONATE 200 MG/ML
4 INJECTION INTRAMUSCULAR ONCE
Status: COMPLETED | OUTPATIENT
Start: 2024-07-10 | End: 2024-07-10

## 2024-07-10 RX ADMIN — TESTOSTERONE CYPIONATE 4 MG: 200 INJECTION INTRAMUSCULAR at 07:50

## 2024-07-10 RX ADMIN — BUPIVACAINE HYDROCHLORIDE 0.5 ML: 5 INJECTION, SOLUTION PERINEURAL at 07:50
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
## 2024-07-12 RX ORDER — LEVOTHYROXINE SODIUM 0.15 MG/1
150 TABLET ORAL DAILY
COMMUNITY

## 2024-07-12 RX ORDER — CLONIDINE HYDROCHLORIDE 0.2 MG/1
1 TABLET ORAL EVERY 8 HOURS PRN
COMMUNITY
Start: 2024-06-18

## 2024-07-12 NOTE — PRE-PROCEDURE INSTRUCTIONS
ATIENT INSTRUCTED TO BE:    - NOTHING TO EAT AFTER MIDNIGHT OR CHEW, EXCEPT CAN HAVE CLEAR LIQUIDS 2 HOURS PRIOR TO SURGERY ARRIVAL TIME , NO MORE THAN 8 OZ. (NOTHING RED)     - TO HOLD ALL VITAMINS, SUPPLEMENTS, NSAIDS FOR ONE WEEK PRIOR TO THEIR SURGICAL PROCEDURE    - DO NOT TAKE _____NA_________________ 7 DAYS PRIOR TO PROCEDURE PER ANESTHESIA RECOMMENDATIONS/INSTRUCTIONS     - INSTRUCTED PT TO USE SURGICAL SOAP 1 TIME THE NIGHT PRIOR TO SURGERY ___________ OR THE AM OF SURGERY _____________   USE THE SOAP FROM NECK TO TOES, AVOID THEIR FACE, HAIR, AND PRIVATE PARTS. IF USE THE SOAP THE NIGHT PRIOR TO SURGERY, CHANGE BED LINENS AND NO PETS IN THE BED.     INSTRUCTED NO LOTIONS, JEWELRY, PIERCINGS,  NAIL POLISH, OR DEODORANT DAY OF SURGERY    - IF DIABETIC, CHECK BLOOD GLUCOSE IF LESS THAN 70 OR HAVING SYMPTOMS CALL THE PREOP AREA FOR INSTRUCTIONS ON AM OF SURGERY ([phone])    -INSTRUCTED TO TAKE THE FOLLOWING MEDICATIONS THE DAY OF SURGERY WITH SIPS OF WATER: ABILIFY, ATORVASTATIN, COREG, SYNTHROID          - DO NOT BRING ANY MEDICATIONS WITH YOU TO THE HOSPITAL THE DAY OF SURGERY, EXCEPT IF USE INHALERS. BRING INHALERS DAY OF SURGERY       - BRING CPAP OR BIPAP TO THE HOSPITAL ONLY IF YOU ARE SPENDING THE NIGHT  NA  - DO NOT SMOKE OR VAPE 24 HOURS PRIOR TO PROCEDURE PER ANESTHESIA REQUEST   NA  -MAKE SURE YOU HAVE A RIDE HOME OR SOMEONE TO STAY WITH YOU THE DAY OF THE PROCEDURE AFTER YOU GO HOME     - FOLLOW ANY OTHER INSTRUCTIONS GIVEN TO YOU BY YOUR SURGEON'S OFFICE.     - DAY OF SURGERY ____________, COME TO ELEVATOR A, THIRD FLOOR, CHECK IN AT THE DESK FOR REGISTRATION/SURGERY   - YOU WILL RECEIVE A PHONE CALL THE DAY PRIOR TO SURGERY BETWEEN 1PM AND 4 PM WITH ARRIVAL TIME, IF YOUR SURGERY IS ON A MONDAY YOU WILL RECEIVE A CALL THE FRIDAY PRIOR TO SURGERY DATE    - BRING CASH OR CREDIT CARD FOR COPAYMENT OF MEDICATIONS AFTER SURGERY IF YOU USE THE HOSPITAL PHARMACY (MED[facility] TO BED)    - PREADMISSION  TESTING NURSE ESTEE -[phone] IF HAVE ANY QUESTIONS     -PATIENT PROVIDED THE NUMBER FOR [facility] SURGICAL DEPT IF HAD QUESTIONS AFTER HOURS PRIOR TO SURGERY ([phone]   INFORMED PT IF NO ANSWER, LEAVE A MESSAGE AND SOMEONE WILL RETURN THEIR CALL       PATIENT VERBALIZED UNDERSTANDING

## 2024-07-15 ENCOUNTER — ANESTHESIA EVENT (OUTPATIENT)
Dept: PERIOP | Facility: HOSPITAL | Age: 79
End: 2024-07-15
Payer: MEDICARE

## 2024-07-16 ENCOUNTER — HOSPITAL ENCOUNTER (OUTPATIENT)
Facility: HOSPITAL | Age: 79
Setting detail: HOSPITAL OUTPATIENT SURGERY
Discharge: HOME | End: 2024-07-16
Attending: SURGERY | Admitting: SURGERY
Payer: MEDICARE

## 2024-07-16 ENCOUNTER — ANESTHESIA (OUTPATIENT)
Dept: PERIOP | Facility: HOSPITAL | Age: 79
End: 2024-07-16
Payer: MEDICARE

## 2024-07-16 VITALS
BODY MASS INDEX: 26.15 KG/M2 | SYSTOLIC BLOOD PRESSURE: 111 MMHG | HEART RATE: 76 BPM | OXYGEN SATURATION: 96 % | WEIGHT: 162.7 LBS | TEMPERATURE: 97.8 F | HEIGHT: 66 IN | RESPIRATION RATE: 18 BRPM | DIASTOLIC BLOOD PRESSURE: 47 MMHG

## 2024-07-16 DIAGNOSIS — N18.6 ESRD ON DIALYSIS: ICD-10-CM

## 2024-07-16 DIAGNOSIS — N18.5 STAGE 5 CHRONIC KIDNEY DISEASE: ICD-10-CM

## 2024-07-16 DIAGNOSIS — N18.4 CKD (CHRONIC KIDNEY DISEASE), STAGE IV: Primary | ICD-10-CM

## 2024-07-16 DIAGNOSIS — Z99.2 ESRD ON DIALYSIS: ICD-10-CM

## 2024-07-16 LAB
ANION GAP SERPL CALCULATED.3IONS-SCNC: 12.9 MMOL/L (ref 5–15)
BASOPHILS # BLD AUTO: 0.03 10*3/MM3 (ref 0–0.2)
BASOPHILS NFR BLD AUTO: 0.5 % (ref 0–1.5)
BUN SERPL-MCNC: 59 MG/DL (ref 8–23)
BUN/CREAT SERPL: 11 (ref 7–25)
CALCIUM SPEC-SCNC: 8.9 MG/DL (ref 8.6–10.5)
CHLORIDE SERPL-SCNC: 102 MMOL/L (ref 98–107)
CHOLEST SERPL-MCNC: 138 MG/DL (ref 0–200)
CO2 SERPL-SCNC: 22.1 MMOL/L (ref 22–29)
CREAT SERPL-MCNC: 5.35 MG/DL (ref 0.76–1.27)
DEPRECATED RDW RBC AUTO: 54.1 FL (ref 37–54)
EGFRCR SERPLBLD CKD-EPI 2021: 10.3 ML/MIN/1.73
EOSINOPHIL # BLD AUTO: 0.12 10*3/MM3 (ref 0–0.4)
EOSINOPHIL NFR BLD AUTO: 1.9 % (ref 0.3–6.2)
ERYTHROCYTE [DISTWIDTH] IN BLOOD BY AUTOMATED COUNT: 16.4 % (ref 12.3–15.4)
GLUCOSE BLDC GLUCOMTR-MCNC: 133 MG/DL (ref 70–99)
GLUCOSE SERPL-MCNC: 159 MG/DL (ref 65–99)
HCT VFR BLD AUTO: 34.5 % (ref 37.5–51)
HDLC SERPL-MCNC: 56 MG/DL (ref 40–60)
HGB BLD-MCNC: 10.6 G/DL (ref 13–17.7)
IMM GRANULOCYTES # BLD AUTO: 0.06 10*3/MM3 (ref 0–0.05)
IMM GRANULOCYTES NFR BLD AUTO: 0.9 % (ref 0–0.5)
LDLC SERPL CALC-MCNC: 62 MG/DL (ref 0–100)
LDLC/HDLC SERPL: 1.07 {RATIO}
LYMPHOCYTES # BLD AUTO: 2.17 10*3/MM3 (ref 0.7–3.1)
LYMPHOCYTES NFR BLD AUTO: 33.8 % (ref 19.6–45.3)
MCH RBC QN AUTO: 29.5 PG (ref 26.6–33)
MCHC RBC AUTO-ENTMCNC: 30.7 G/DL (ref 31.5–35.7)
MCV RBC AUTO: 96.1 FL (ref 79–97)
MONOCYTES # BLD AUTO: 0.54 10*3/MM3 (ref 0.1–0.9)
MONOCYTES NFR BLD AUTO: 8.4 % (ref 5–12)
NEUTROPHILS NFR BLD AUTO: 3.5 10*3/MM3 (ref 1.7–7)
NEUTROPHILS NFR BLD AUTO: 54.5 % (ref 42.7–76)
NRBC BLD AUTO-RTO: 0 /100 WBC (ref 0–0.2)
PLATELET # BLD AUTO: 128 10*3/MM3 (ref 140–450)
PMV BLD AUTO: 10.1 FL (ref 6–12)
POTASSIUM SERPL-SCNC: 5.5 MMOL/L (ref 3.5–5.2)
RBC # BLD AUTO: 3.59 10*6/MM3 (ref 4.14–5.8)
SODIUM SERPL-SCNC: 137 MMOL/L (ref 136–145)
TRIGL SERPL-MCNC: 111 MG/DL (ref 0–150)
TSH SERPL DL<=0.05 MIU/L-ACNC: 6.4 UIU/ML (ref 0.27–4.2)
VLDLC SERPL-MCNC: 20 MG/DL (ref 5–40)
WBC NRBC COR # BLD AUTO: 6.42 10*3/MM3 (ref 3.4–10.8)

## 2024-07-16 PROCEDURE — 25010000002 VASOPRESSIN 20 UNIT/ML SOLUTION

## 2024-07-16 PROCEDURE — 36821 AV FUSION DIRECT ANY SITE: CPT

## 2024-07-16 PROCEDURE — 80061 LIPID PANEL: CPT | Performed by: STUDENT IN AN ORGANIZED HEALTH CARE EDUCATION/TRAINING PROGRAM

## 2024-07-16 PROCEDURE — 25010000002 HEPARIN (PORCINE) PER 1000 UNITS: Performed by: SURGERY

## 2024-07-16 PROCEDURE — 25010000002 FENTANYL CITRATE (PF) 50 MCG/ML SOLUTION

## 2024-07-16 PROCEDURE — 25010000002 LIDOCAINE 1 % SOLUTION 20 ML VIAL: Performed by: SURGERY

## 2024-07-16 PROCEDURE — 84443 ASSAY THYROID STIM HORMONE: CPT | Performed by: STUDENT IN AN ORGANIZED HEALTH CARE EDUCATION/TRAINING PROGRAM

## 2024-07-16 PROCEDURE — 80048 BASIC METABOLIC PNL TOTAL CA: CPT | Performed by: SURGERY

## 2024-07-16 PROCEDURE — 83036 HEMOGLOBIN GLYCOSYLATED A1C: CPT | Performed by: STUDENT IN AN ORGANIZED HEALTH CARE EDUCATION/TRAINING PROGRAM

## 2024-07-16 PROCEDURE — 25010000002 ONDANSETRON PER 1 MG

## 2024-07-16 PROCEDURE — 85025 COMPLETE CBC W/AUTO DIFF WBC: CPT | Performed by: SURGERY

## 2024-07-16 PROCEDURE — 25810000003 SODIUM CHLORIDE 0.9 % SOLUTION: Performed by: ANESTHESIOLOGY

## 2024-07-16 PROCEDURE — 25010000002 DEXAMETHASONE PER 1 MG

## 2024-07-16 PROCEDURE — 25010000002 PROPOFOL 200 MG/20ML EMULSION

## 2024-07-16 PROCEDURE — 25010000002 CEFAZOLIN PER 500 MG: Performed by: SURGERY

## 2024-07-16 PROCEDURE — 82948 REAGENT STRIP/BLOOD GLUCOSE: CPT

## 2024-07-16 PROCEDURE — 25010000002 HEPARIN (PORCINE) PER 1000 UNITS

## 2024-07-16 PROCEDURE — 25010000002 MIDAZOLAM PER 1MG: Performed by: ANESTHESIOLOGY

## 2024-07-16 PROCEDURE — 36821 AV FUSION DIRECT ANY SITE: CPT | Performed by: SURGERY

## 2024-07-16 PROCEDURE — 25010000002 BUPIVACAINE (PF) 0.5 % SOLUTION 10 ML VIAL: Performed by: SURGERY

## 2024-07-16 DEVICE — LIGACLIP MCA MULTIPLE CLIP APPLIERS, 20 MEDIUM CLIPS
Type: IMPLANTABLE DEVICE | Site: ARM | Status: FUNCTIONAL
Brand: LIGACLIP

## 2024-07-16 DEVICE — LIGACLIP MCA MULTIPLE CLIP APPLIERS, 20 SMALL CLIPS
Type: IMPLANTABLE DEVICE | Site: ARM | Status: FUNCTIONAL
Brand: LIGACLIP

## 2024-07-16 RX ORDER — MIDAZOLAM HYDROCHLORIDE 2 MG/2ML
0.5 INJECTION, SOLUTION INTRAMUSCULAR; INTRAVENOUS ONCE
Status: COMPLETED | OUTPATIENT
Start: 2024-07-16 | End: 2024-07-16

## 2024-07-16 RX ORDER — HEPARIN SODIUM 5000 [USP'U]/ML
INJECTION, SOLUTION INTRAVENOUS; SUBCUTANEOUS AS NEEDED
Status: DISCONTINUED | OUTPATIENT
Start: 2024-07-16 | End: 2024-07-16 | Stop reason: SURG

## 2024-07-16 RX ORDER — ONDANSETRON 2 MG/ML
4 INJECTION INTRAMUSCULAR; INTRAVENOUS ONCE AS NEEDED
Status: DISCONTINUED | OUTPATIENT
Start: 2024-07-16 | End: 2024-07-16 | Stop reason: HOSPADM

## 2024-07-16 RX ORDER — DEXAMETHASONE SODIUM PHOSPHATE 4 MG/ML
INJECTION, SOLUTION INTRA-ARTICULAR; INTRALESIONAL; INTRAMUSCULAR; INTRAVENOUS; SOFT TISSUE AS NEEDED
Status: DISCONTINUED | OUTPATIENT
Start: 2024-07-16 | End: 2024-07-16 | Stop reason: SURG

## 2024-07-16 RX ORDER — PHENYLEPHRINE HCL IN 0.9% NACL 1 MG/10 ML
SYRINGE (ML) INTRAVENOUS AS NEEDED
Status: DISCONTINUED | OUTPATIENT
Start: 2024-07-16 | End: 2024-07-16 | Stop reason: SURG

## 2024-07-16 RX ORDER — OXYCODONE HYDROCHLORIDE 5 MG/1
5 TABLET ORAL
Status: DISCONTINUED | OUTPATIENT
Start: 2024-07-16 | End: 2024-07-16 | Stop reason: HOSPADM

## 2024-07-16 RX ORDER — PROPOFOL 10 MG/ML
INJECTION, EMULSION INTRAVENOUS AS NEEDED
Status: DISCONTINUED | OUTPATIENT
Start: 2024-07-16 | End: 2024-07-16 | Stop reason: SURG

## 2024-07-16 RX ORDER — SODIUM CHLORIDE 9 MG/ML
9 INJECTION, SOLUTION INTRAVENOUS CONTINUOUS PRN
Status: DISCONTINUED | OUTPATIENT
Start: 2024-07-16 | End: 2024-07-16 | Stop reason: HOSPADM

## 2024-07-16 RX ORDER — FENTANYL CITRATE 50 UG/ML
INJECTION, SOLUTION INTRAMUSCULAR; INTRAVENOUS AS NEEDED
Status: DISCONTINUED | OUTPATIENT
Start: 2024-07-16 | End: 2024-07-16 | Stop reason: SURG

## 2024-07-16 RX ORDER — OXYCODONE HYDROCHLORIDE AND ACETAMINOPHEN 5; 325 MG/1; MG/1
1 TABLET ORAL EVERY 6 HOURS PRN
Qty: 20 TABLET | Refills: 0 | Status: SHIPPED | OUTPATIENT
Start: 2024-07-16

## 2024-07-16 RX ORDER — VASOPRESSIN 20 U/ML
INJECTION PARENTERAL AS NEEDED
Status: DISCONTINUED | OUTPATIENT
Start: 2024-07-16 | End: 2024-07-16 | Stop reason: SURG

## 2024-07-16 RX ORDER — LIDOCAINE HYDROCHLORIDE 20 MG/ML
INJECTION, SOLUTION EPIDURAL; INFILTRATION; INTRACAUDAL; PERINEURAL AS NEEDED
Status: DISCONTINUED | OUTPATIENT
Start: 2024-07-16 | End: 2024-07-16 | Stop reason: SURG

## 2024-07-16 RX ORDER — PROMETHAZINE HYDROCHLORIDE 12.5 MG/1
25 TABLET ORAL ONCE AS NEEDED
Status: DISCONTINUED | OUTPATIENT
Start: 2024-07-16 | End: 2024-07-16 | Stop reason: HOSPADM

## 2024-07-16 RX ORDER — ONDANSETRON 2 MG/ML
INJECTION INTRAMUSCULAR; INTRAVENOUS AS NEEDED
Status: DISCONTINUED | OUTPATIENT
Start: 2024-07-16 | End: 2024-07-16 | Stop reason: SURG

## 2024-07-16 RX ORDER — PROMETHAZINE HYDROCHLORIDE 25 MG/1
25 SUPPOSITORY RECTAL ONCE AS NEEDED
Status: DISCONTINUED | OUTPATIENT
Start: 2024-07-16 | End: 2024-07-16 | Stop reason: HOSPADM

## 2024-07-16 RX ORDER — EPHEDRINE SULFATE 50 MG/ML
INJECTION INTRAVENOUS AS NEEDED
Status: DISCONTINUED | OUTPATIENT
Start: 2024-07-16 | End: 2024-07-16 | Stop reason: SURG

## 2024-07-16 RX ORDER — ACETAMINOPHEN 500 MG
500 TABLET ORAL ONCE
Status: COMPLETED | OUTPATIENT
Start: 2024-07-16 | End: 2024-07-16

## 2024-07-16 RX ADMIN — ACETAMINOPHEN 500 MG: 500 TABLET ORAL at 10:08

## 2024-07-16 RX ADMIN — EPHEDRINE SULFATE 10 MG: 50 INJECTION INTRAVENOUS at 11:02

## 2024-07-16 RX ADMIN — LIDOCAINE HYDROCHLORIDE 60 MG: 20 INJECTION, SOLUTION EPIDURAL; INFILTRATION; INTRACAUDAL; PERINEURAL at 10:50

## 2024-07-16 RX ADMIN — EPHEDRINE SULFATE 10 MG: 50 INJECTION INTRAVENOUS at 11:08

## 2024-07-16 RX ADMIN — ONDANSETRON 4 MG: 2 INJECTION INTRAMUSCULAR; INTRAVENOUS at 10:57

## 2024-07-16 RX ADMIN — DEXAMETHASONE SODIUM PHOSPHATE 4 MG: 4 INJECTION, SOLUTION INTRAMUSCULAR; INTRAVENOUS at 10:57

## 2024-07-16 RX ADMIN — VASOPRESSIN 1 UNITS: 20 INJECTION INTRAVENOUS at 11:26

## 2024-07-16 RX ADMIN — HEPARIN SODIUM 3000 UNITS: 5000 INJECTION INTRAVENOUS; SUBCUTANEOUS at 11:13

## 2024-07-16 RX ADMIN — PROPOFOL 100 MG: 10 INJECTION, EMULSION INTRAVENOUS at 10:50

## 2024-07-16 RX ADMIN — MIDAZOLAM HYDROCHLORIDE 0.5 MG: 1 INJECTION, SOLUTION INTRAMUSCULAR; INTRAVENOUS at 10:10

## 2024-07-16 RX ADMIN — SODIUM CHLORIDE 9 ML/HR: 9 INJECTION, SOLUTION INTRAVENOUS at 10:10

## 2024-07-16 RX ADMIN — Medication 100 MCG: at 11:20

## 2024-07-16 RX ADMIN — EPHEDRINE SULFATE 15 MG: 50 INJECTION INTRAVENOUS at 10:53

## 2024-07-16 RX ADMIN — Medication 50 MCG: at 11:16

## 2024-07-16 RX ADMIN — SODIUM CHLORIDE 2 G: 9 INJECTION, SOLUTION INTRAVENOUS at 10:55

## 2024-07-16 RX ADMIN — EPHEDRINE SULFATE 15 MG: 50 INJECTION INTRAVENOUS at 10:56

## 2024-07-16 RX ADMIN — FENTANYL CITRATE 25 MCG: 50 INJECTION, SOLUTION INTRAMUSCULAR; INTRAVENOUS at 10:50

## 2024-07-16 NOTE — PROGRESS NOTES
Chief Complaint  Hypertension, Diabetes, Hyperlipidemia, Hypothyroidism, and Procedure (Patient states he had surgery yesterday, left arm, patient wants the site looked at to be sure it looks okay )    Subjective          Nelson Wang presents to [facility]  History of Present Illness    Had vascular surgery procedure yesterday.    Receives dialysis Tuesday/Thursday/Saturday's.  Takes losartan on dialysis days.  Reports gets cramps at times in hands and calves with dialysis.    Using 15-20U long acting insulin daily.  Does not check his blood sugar.  Takes 20U when eats larger amount of carbs that day.    In October will see dermatology.  He is still off [employer].    Current Outpatient Medications   Medication Instructions    allopurinol (ZYLOPRIM) 100 MG tablet 1 tablet, Oral, Daily, As needed    ARIPiprazole (ABILIFY) 2 mg, Oral, 2 Times Daily    atorvastatin (LIPITOR) 40 mg, Oral, Daily    carvedilol (COREG) 25 mg, Oral, 2 Times Daily With Meals    cloNIDine (CATAPRES) 0.2 MG tablet 1 tablet, Oral, Every 8 Hours PRN    Continuous Glucose  (Dexcom G7 ) device 1 Units, Does not apply, Continuous    Continuous Glucose Sensor (Dexcom G7 Sensor) misc 1 Units, Does not apply, Continuous    Insulin Glargine (LANTUS SOLOSTAR) 10 Units, Subcutaneous, Nightly    levothyroxine (SYNTHROID, LEVOTHROID) 150 mcg, Oral, Daily    Levothyroxine Sodium 175 mcg, Oral, Daily    Lokelma 10 g packet 1 packet, Oral, Every Other Day, TAKES MON, WED, FRI, SUN    losartan (COZAAR) 50 mg, Oral, Nightly    Methoxy PEG-Epoetin Beta (MIRCERA IJ) 30 mcg, Every 14 Days, RECEIVES DURING DIALYSIS    oxyCODONE-acetaminophen (Percocet) 5-325 MG per tablet 1 tablet, Oral, Every 6 Hours PRN    sertraline (ZOLOFT) 100 mg, Oral, Nightly    sevelamer (RENVELA) 800 mg, Oral, 2 Times Daily    torsemide (DEMADEX) 100 mg, Oral, Daily    Turmeric (QC TUMERIC COMPLEX PO) 2 capsules, Oral, 2  "Times Daily       The following portions of the patient's history were reviewed and updated as appropriate: allergies, current medications, past family history, past medical history, past social history, past surgical history, and problem list.    Objective   Vital Signs:   /76 (BP Location: Right arm)   Pulse 66   Temp 97.7 °F (36.5 °C) (Temporal)   Ht 167.6 cm (66\")   Wt 77.2 kg (170 lb 3.2 oz)   SpO2 96%   BMI 27.47 kg/m²     BP Readings from Last 3 Encounters:   07/17/24 132/76   07/16/24 111/47   07/10/24 145/73     Wt Readings from Last 3 Encounters:   07/17/24 77.2 kg (170 lb 3.2 oz)   07/16/24 73.8 kg (162 lb 11.2 oz)   07/10/24 73.5 kg (162 lb)      Physical Exam  Vitals reviewed.   Constitutional:       General: He is not in acute distress.     Appearance: Normal appearance. He is not ill-appearing, toxic-appearing or diaphoretic.   HENT:      Head: Normocephalic and atraumatic.      Right Ear: External ear normal.      Left Ear: External ear normal.   Eyes:      Conjunctiva/sclera: Conjunctivae normal.   Cardiovascular:      Rate and Rhythm: Normal rate and regular rhythm.      Pulses: Normal pulses.      Heart sounds: Normal heart sounds. No murmur heard.     No friction rub. No gallop.   Pulmonary:      Effort: Pulmonary effort is normal. No respiratory distress.      Breath sounds: Normal breath sounds. No stridor. No wheezing, rhonchi or rales.   Chest:      Chest wall: No tenderness.   Abdominal:      General: Abdomen is flat.      Palpations: Abdomen is soft. There is no mass.      Tenderness: There is no abdominal tenderness.   Musculoskeletal:      Right lower leg: No edema.      Left lower leg: No edema.   Skin:     General: Skin is warm and dry.      Comments: Vitiligo noted (numerous hypopigmented patches).  LUE vascular procedure site noted.  No erythema or induration.  Ecchymosis noted surrounding tissue.  Audible bruit LUE around vascular access site.   Neurological:      Mental " Status: He is alert. Mental status is at baseline.   Psychiatric:         Behavior: Behavior normal.         Thought Content: Thought content normal.         Judgment: Judgment normal.          Result Review :   The following data was reviewed by: Domingo Bravo MD on 07/17/2024:  Common labs          7/4/2024    00:00 7/11/2024    00:00 7/16/2024    08:07   Common Labs   Glucose   159    BUN   59    Creatinine   5.35    Sodium   137    Potassium 5.3     5.8     5.5    Chloride   102    Calcium 8.9      8.9    WBC   6.42    Hemoglobin 10.1        30.3     10.7        32.1     10.6    Hematocrit   34.5    Platelets   128    Total Cholesterol   138    Triglycerides   111    HDL Cholesterol   56    LDL Cholesterol    62    Hemoglobin A1C   6.50       Details          This result is from an external source.                    Lab Results   Component Value Date    COVID19 Not Detected 04/08/2024    RSV Not Detected 04/08/2024    INR 0.96 05/28/2024    BILIRUBINUR Negative 04/09/2024            Assessment and Plan    Diagnoses and all orders for this visit:    1. Essential hypertension (Primary)    2. ESRD (end stage renal disease) on dialysis    3. Type 2 diabetes mellitus without complication, with long-term current use of insulin  -     Hemoglobin A1c  -     Continuous Glucose Sensor (Dexcom G7 Sensor) misc; Use 1 Units Continuous.  Dispense: 1 each; Refill: 12  -     Continuous Glucose  (Dexcom G7 ) device; Use 1 Units Continuous.  Dispense: 1 each; Refill: 12    4. Hyperlipidemia, unspecified hyperlipidemia type  Overview:  Formatting of this note might be different from the original.  Last Assessment & Plan:   Formatting of this note might be different from the original.  His lipids are at goal with HDL of 62 and LDL of 47.  He will continue with atorvastatin 40 mg a day and his exercise program  Formatting of this note might be different from the original.  Last Assessment & Plan:   Formatting of  this note might be different from the original.  Lipid abnormalities are are supposedly at goal based on the readings he has told me that were done last month.  We will try to get the results.  He will continue on his strict low-cholesterol diet and atorvastatin 40 mg once a day bedtime    Orders:  -     Lipid Panel    5. Acquired hypothyroidism  -     TSH  -     Levothyroxine Sodium 175 MCG capsule; Take 175 mcg by mouth Daily.  Dispense: 90 capsule; Refill: 2    Other orders  -     atorvastatin (LIPITOR) 40 MG tablet; Take 1 tablet by mouth Daily.  Dispense: 90 tablet; Refill: 1  -     sevelamer (RENVELA) 800 MG tablet; Take 1 tablet by mouth 2 (Two) Times a Day.  Dispense: 90 tablet; Refill: 1          Medications Discontinued During This Encounter   Medication Reason    Risankizumab-rzaa (Skyrizi) 150 MG/ML solution prefilled syringe     atorvastatin (LIPITOR) 40 MG tablet Reorder    sevelamer (RENVELA) 800 MG tablet Reorder          Follow Up   Return in about 3 months (around 10/17/2024) for ESRD, Type 2 Diabetes, Hypertension, Hypothyroid.  Patient was given instructions and counseling regarding his condition or for health maintenance advice. Please see specific information pulled into the AVS if appropriate.       Domingo Bravo MD  07/17/24  17:27 EDT

## 2024-07-16 NOTE — DISCHARGE INSTRUCTIONS
DISCHARGE INSTRUCTIONS  DIALYSIS SHUNT      For your surgery you had:  General anesthesia (you may have a sore throat for the first 24 hours)     You may experience dizziness, drowsiness, or light-headedness for several hours following surgery/procedure.  Do not stay alone today or tonight.  Limit your activity for 24 hours.  Resume your diet slowly.  Follow whatever special dietary instructions you may have been given by your doctor.  You should not drive or operate machinery, drink alcohol, or sign legally binding documents for 24 hours or while you are taking pain medication.    NOTIFY YOUR DOCTOR IF YOU EXPERIENCE ANY OF THE FOLLOWING:  Temperature greater than 101 degrees Fahrenheit  Shaking Chills  Redness or excessive drainage from incision  Nausea, vomiting and/or pain that is not controlled by prescribed medications  Increase in bleeding or bleeding that is excessive  Unable to urinate in 6 hours after surgery  If unable to reach your doctor, please go to the closest Emergency Room  Keep left arm elevated on pillows.    Limit excessive bending of extremity on left side.    No tight clothing to site.    No needle sticks or blood pressures in left arm.    Limit sleeping on left side.    Notify physician of excessive bleeding at site.    Last dose of pain medication was given at:    TYLENOL 500 MG AT 10:08 AM .    SPECIAL INSTRUCTIONS:    May wash area in 2 days.  Follow-up in the office in 2 weeks, call for appointment.  Resume home diet.  Resume home medications.  No lifting greater than 15 pounds for 2 weeks.

## 2024-07-16 NOTE — OP NOTE
ARTERIOVENOUS FISTULA FORMATION  Procedure Report    Patient Name:  Nelson Wang  YOB: 1945    Date of Surgery:  7/16/2024     Indications: Need for permanent access for hemodialysis    Pre-op Diagnosis:   ESRD on dialysis [N18.6, Z99.2]       Post-Op Diagnosis Codes:     * ESRD on dialysis [N18.6, Z99.2]    Procedure(s):  Creation of left arm arteriovenous fistula    Staff:  Surgeon(s):  Moses Mir MD    Assistant: Mary Jane Camargo RN CSA    Anesthesia: Monitored Anesthesia Care    Estimated Blood Loss: 15 mL    Implants:    Implant Name Type Inv. Item Serial No.  Lot No. LRB No. Used Action   CLIPAPPLR M/ ENDO LIGACLIP 20CLP 11IN MD - [id number] Implant CLIPAPPLR M/ ENDO LIGACLIP 20CLP 11IN MD  [employer]  [employer] J [id number] Left 1 Implanted   CLIPAPPLR M/ ENDO LIGACLIP 9 3/8IN SM - [id number] Implant CLIPAPPLR M/ ENDO LIGACLIP 9 3/8IN SM  [employer]  DIV [employer] J [employer] J [id number] Left 1 Implanted       Specimen: None       Findings: Palpable thrill upon completion of the procedure.  Doppler examination reveals a pulsatile signal with high amplitude continuous diastolic flow.    Complications: None    Description of Procedure: The patient was brought into the operating room and was placed in the supine position.  The patient was then given intravenous sedation by anesthesia and positioned with the left arm extended on an armboard.  The patient was then prepped and draped in the usual aseptic fashion.  A stockinette was applied to the hand and forearm and was secured in place using Coban.  A timeout was taken to confirm patient, procedure and laterality.  Local anesthesia was then administered at the projected site of incision.  An incision was then made in a transverse fashion over the distal left upper arm approximately 1 fingerbreadth above the antecubital crease.  The subcutaneous tissue was traversed using electrocautery.  Blunt and sharp dissection were then used  to dissect the cephalic vein.  This was dissected approximately 2 to 3 cm distal and proximal to the incision.  The vein was marked with a marker to avoid twisting.  The distal end was then clipped and the vein transected.  The vein was then flushed with heparinized saline and probed with a #3 dilator with no resistance.  It was controlled with a bulldog.  Blunt and sharp dissection were then used to dissected the brachial artery.  A segment of approximately 2 cm was freed.  Proximal and distal control was obtained using Vesseloops.  The patient was given systemic anticoagulation in the form of heparin 3000 units IV.  3 minutes were allowed for the heparin to circulate.  The brachial artery was then clamped distally and proximally.  A longitudinal arteriotomy was made using an 11 blade and extended using Nash scissors.  The total length of the arteriotomy was 6 mm.  The end of the vein was then fashioned to meet the dimensions of the arteriotomy.  An anastomosis was then created using 6-0 Prolene in a running fashion.  Just prior to completion of the anastomosis all vessels were bled.  The anastomosis was completed.  Doppler examination revealed the above-mentioned findings.  The wound was inspected for hemostasis and hemostasis assured.  The wound was then approximated using 3-0 Vicryl in a running fashion for approximation of the dermis and subcutaneous tissue.  Dermabond was then applied to the wound.  The patient tolerated the procedure well.         Assistant: Mary Jane Camargo RN CSA  was responsible for performing the following activities: First assist and their skilled assistance was necessary for the success of this case.    Moses Mir MD     Date: 7/16/2024  Time: 11:42 EDT

## 2024-07-16 NOTE — ANESTHESIA PREPROCEDURE EVALUATION
Anesthesia Evaluation     Patient summary reviewed and Nursing notes reviewed   no history of anesthetic complications:   NPO Solid Status: > 8 hours  NPO Liquid Status: > 2 hours           Airway   Mallampati: III  TM distance: >3 FB  Neck ROM: full  No difficulty expected  Dental      Pulmonary - normal exam    breath sounds clear to auscultation  (+) ,shortness of breath, sleep apnea  Cardiovascular - normal exam  Exercise tolerance: poor (<4 METS)    Rhythm: regular  Rate: normal    (+) hypertension, hyperlipidemia      Neuro/Psych  (+) psychiatric history Depression  GI/Hepatic/Renal/Endo    (+) GERD well controlled, renal disease- ESRD, diabetes mellitus, thyroid problem hypothyroidism    Musculoskeletal (-) negative ROS    Abdominal    Substance History - negative use     OB/GYN negative ob/gyn ROS         Other - negative ROS       ROS/Med Hx Other: HX OF HTN, ESRD DIAYLSIS TUES, THUR, SAT, DM. METS>4. NO CP/SOA. STRESS +, CATH 5/ 2024Appears mild, nonobstructive coronary artery disease. LVEDP 23 mmHg. ELM               Anesthesia Plan    ASA 4     general     (Patient understands anesthesia not responsible for dental damage.  Last HD last night.  LMA fine)  intravenous induction     Anesthetic plan, risks, benefits, and alternatives have been provided, discussed and informed consent has been obtained with: patient.  Pre-procedure education provided  Plan discussed with CRNA.    CODE STATUS:

## 2024-07-16 NOTE — ANESTHESIA POSTPROCEDURE EVALUATION
Patient: Nelson Wang    Procedure Summary       Date: 07/16/24 Room / Location: [facility] OR 06 / [facility] MAIN OR    Anesthesia Start: 1046 Anesthesia Stop: 1145    Procedure: Creation of left arm arteriovenous fistula (Left) Diagnosis:       ESRD on dialysis      (ESRD on dialysis [N18.6, Z99.2])    Surgeons: Moses Mir MD Provider: Scottie Roca MD    Anesthesia Type: general ASA Status: 4            Anesthesia Type: general    Vitals  Vitals Value Taken Time   /68 07/16/24 1215   Temp 36.1 °C (97 °F) 07/16/24 1210   Pulse 65 07/16/24 1216   Resp 18 07/16/24 1215   SpO2 92 % 07/16/24 1216   Vitals shown include unfiled device data.        Post Anesthesia Care and Evaluation    Patient location during evaluation: bedside  Patient participation: complete - patient participated  Level of consciousness: awake and alert  Pain management: adequate    Airway patency: patent  Anesthetic complications: No anesthetic complications  PONV Status: none  Cardiovascular status: acceptable  Respiratory status: acceptable  Hydration status: acceptable    Comments: An Anesthesiologist personally participated in the most demanding procedures (including induction and emergence if applicable) in the anesthesia plan, monitored the course of anesthesia administration at frequent intervals and remained physically present and available for immediate diagnosis and treatment of emergencies.

## 2024-07-17 ENCOUNTER — OFFICE VISIT (OUTPATIENT)
Dept: INTERNAL MEDICINE | Facility: CLINIC | Age: 79
End: 2024-07-17
Payer: MEDICARE

## 2024-07-17 ENCOUNTER — TELEPHONE (OUTPATIENT)
Dept: SLEEP MEDICINE | Facility: HOSPITAL | Age: 79
End: 2024-07-17
Payer: MEDICARE

## 2024-07-17 VITALS
TEMPERATURE: 97.7 F | HEIGHT: 66 IN | DIASTOLIC BLOOD PRESSURE: 76 MMHG | BODY MASS INDEX: 27.35 KG/M2 | SYSTOLIC BLOOD PRESSURE: 132 MMHG | HEART RATE: 66 BPM | OXYGEN SATURATION: 96 % | WEIGHT: 170.2 LBS

## 2024-07-17 DIAGNOSIS — E11.9 TYPE 2 DIABETES MELLITUS WITHOUT COMPLICATION, WITH LONG-TERM CURRENT USE OF INSULIN: ICD-10-CM

## 2024-07-17 DIAGNOSIS — I10 ESSENTIAL HYPERTENSION: Primary | ICD-10-CM

## 2024-07-17 DIAGNOSIS — E78.5 HYPERLIPIDEMIA, UNSPECIFIED HYPERLIPIDEMIA TYPE: ICD-10-CM

## 2024-07-17 DIAGNOSIS — E03.9 ACQUIRED HYPOTHYROIDISM: ICD-10-CM

## 2024-07-17 DIAGNOSIS — N18.6 ESRD (END STAGE RENAL DISEASE) ON DIALYSIS: ICD-10-CM

## 2024-07-17 DIAGNOSIS — Z79.4 TYPE 2 DIABETES MELLITUS WITHOUT COMPLICATION, WITH LONG-TERM CURRENT USE OF INSULIN: ICD-10-CM

## 2024-07-17 DIAGNOSIS — Z99.2 ESRD (END STAGE RENAL DISEASE) ON DIALYSIS: ICD-10-CM

## 2024-07-17 LAB — HBA1C MFR BLD: 6.5 % (ref 4.8–5.6)

## 2024-07-17 PROCEDURE — 99214 OFFICE O/P EST MOD 30 MIN: CPT | Performed by: STUDENT IN AN ORGANIZED HEALTH CARE EDUCATION/TRAINING PROGRAM

## 2024-07-17 PROCEDURE — 3075F SYST BP GE 130 - 139MM HG: CPT | Performed by: STUDENT IN AN ORGANIZED HEALTH CARE EDUCATION/TRAINING PROGRAM

## 2024-07-17 PROCEDURE — G2211 COMPLEX E/M VISIT ADD ON: HCPCS | Performed by: STUDENT IN AN ORGANIZED HEALTH CARE EDUCATION/TRAINING PROGRAM

## 2024-07-17 PROCEDURE — 1126F AMNT PAIN NOTED NONE PRSNT: CPT | Performed by: STUDENT IN AN ORGANIZED HEALTH CARE EDUCATION/TRAINING PROGRAM

## 2024-07-17 PROCEDURE — 3078F DIAST BP <80 MM HG: CPT | Performed by: STUDENT IN AN ORGANIZED HEALTH CARE EDUCATION/TRAINING PROGRAM

## 2024-07-17 RX ORDER — SEVELAMER CARBONATE 800 MG/1
800 TABLET, FILM COATED ORAL 2 TIMES DAILY
Qty: 90 TABLET | Refills: 1 | Status: SHIPPED | OUTPATIENT
Start: 2024-07-17

## 2024-07-17 RX ORDER — ACYCLOVIR 400 MG/1
1 TABLET ORAL CONTINUOUS
Qty: 1 EACH | Refills: 12 | Status: SHIPPED | OUTPATIENT
Start: 2024-07-17

## 2024-07-17 RX ORDER — ATORVASTATIN CALCIUM 40 MG/1
40 TABLET, FILM COATED ORAL DAILY
Qty: 90 TABLET | Refills: 1 | Status: SHIPPED | OUTPATIENT
Start: 2024-07-17

## 2024-07-17 RX ORDER — LEVOTHYROXINE SODIUM 0.15 MG/1
150 TABLET ORAL DAILY
Qty: 90 TABLET | Refills: 2 | Status: CANCELLED | OUTPATIENT
Start: 2024-07-17

## 2024-07-17 RX ORDER — LEVOTHYROXINE SODIUM 175 UG/1
175 CAPSULE ORAL DAILY
Qty: 90 CAPSULE | Refills: 2 | Status: SHIPPED | OUTPATIENT
Start: 2024-07-17

## 2024-07-17 NOTE — TELEPHONE ENCOUNTER
Called and spoke with daughter regarding results of home sleep test. Patient will use [employer] as DME.Order submitted successfully. Follow up made.

## 2024-07-31 ENCOUNTER — OFFICE VISIT (OUTPATIENT)
Dept: VASCULAR SURGERY | Facility: HOSPITAL | Age: 79
End: 2024-07-31
Payer: MEDICARE

## 2024-07-31 VITALS
DIASTOLIC BLOOD PRESSURE: 60 MMHG | SYSTOLIC BLOOD PRESSURE: 114 MMHG | RESPIRATION RATE: 16 BRPM | OXYGEN SATURATION: 94 % | HEART RATE: 71 BPM | TEMPERATURE: 97.8 F

## 2024-07-31 DIAGNOSIS — N18.6 ESRD ON DIALYSIS: Primary | ICD-10-CM

## 2024-07-31 DIAGNOSIS — Z98.890 STATUS POST CREATION OF ARTERIOVENOUS FISTULA: ICD-10-CM

## 2024-07-31 DIAGNOSIS — Z99.2 ESRD ON DIALYSIS: Primary | ICD-10-CM

## 2024-07-31 PROCEDURE — 3078F DIAST BP <80 MM HG: CPT | Performed by: NURSE PRACTITIONER

## 2024-07-31 PROCEDURE — G0463 HOSPITAL OUTPT CLINIC VISIT: HCPCS | Performed by: NURSE PRACTITIONER

## 2024-07-31 PROCEDURE — 1159F MED LIST DOCD IN RCRD: CPT | Performed by: NURSE PRACTITIONER

## 2024-07-31 PROCEDURE — 3074F SYST BP LT 130 MM HG: CPT | Performed by: NURSE PRACTITIONER

## 2024-07-31 PROCEDURE — 1160F RVW MEDS BY RX/DR IN RCRD: CPT | Performed by: NURSE PRACTITIONER

## 2024-07-31 PROCEDURE — 99024 POSTOP FOLLOW-UP VISIT: CPT | Performed by: NURSE PRACTITIONER

## 2024-07-31 NOTE — PROGRESS NOTES
[facility]     Progress Note    Patient Name: Nelson Wang  : 1945  MRN: [id number]  Primary Care Physician:  Domingo Bravo MD  Date of admission: (Not on file)  Chief Complaint:    Chief Complaint   Patient presents with    Hemodialysis Access    Follow-up     Patient is here as a two week follow up s/p creation of left arm arteriovenous fistula.        Subjective   Subjective     Nelson Wang is a 79 y.o. male presents for follow-up.  He had a left arm brachiocephalic arteriovenous fistula creation performed by Dr. Mir on 2024.  He is currently receiving hemodialysis through a tunneled dialysis catheter in the right chest.  He is doing well, denies any pain, motor and sensory skills intact.    Objective   Objective     Vitals:   Temp:  [97.8 °F (36.6 °C)] 97.8 °F (36.6 °C)  Heart Rate:  [71] 71  Resp:  [16] 16  BP: (114)/(60) 114/60    Physical Exam   General: Alert, no acute distress  Extremities: Left arm: Excellent thrill and bruit,  Surgical incision, no open areas no edema, motor and sensory skills intact.  Neuro: No gross deficits    Result Review    Result Review:  I have personally reviewed the results from the time of this admission to 2024 13:37 EDT and agree with these findings:  []  Laboratory  []  Microbiology  []  Radiology  []  EKG/Telemetry   []  Cardiology/Vascular   []  Pathology  []  Old records  []  Other:    Most notable findings include:     Assessment & Plan   Assessment / Plan     Assessment/Plan:  Diagnoses and all orders for this visit:    1. ESRD on dialysis (Primary)    2. 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 Mir on 2024.  He has excellent thrill and bruit incision is healing well and motor and sensory skills are intact.  I recommended follow-up in 4 weeks to reevaluate.       Active Hospital Problems:  There are no active hospital problems to  display for this patient.          Electronically signed by BECKY Brown, 07/31/24, 1:35 PM EDT.

## 2024-08-12 NOTE — TELEPHONE ENCOUNTER
Caller: Jose Wang    Relationship: Emergency Contact    Best call back number: [phone]     Requested Prescriptions:   Requested Prescriptions     Pending Prescriptions Disp Refills    ARIPiprazole (ABILIFY) 2 MG tablet       Sig: Take 1 tablet by mouth 2 (Two) Times a Day.        Pharmacy where request should be sent: [facility] [address] [place] [address] [address] [facility] [phone] [facility] [phone]      Last office visit with prescribing clinician: 7/17/2024   Last telemedicine visit with prescribing clinician: Visit date not found   Next office visit with prescribing clinician: 10/3/2024     Additional details provided by patient: PATIENT IS COMPLETELY OUT OF MEDICATION.     Does the patient have less than a 3 day supply:  [x] Yes  [] No    Would you like a call back once the refill request has been completed: [] Yes [] No    If the office needs to give you a call back, can they leave a voicemail: [] Yes [] No    Randell Casey Rep   08/12/24 14:59 EDT

## 2024-08-13 RX ORDER — ARIPIPRAZOLE 2 MG/1
2 TABLET ORAL 2 TIMES DAILY
Qty: 60 TABLET | Refills: 0
Start: 2024-08-13 | End: 2024-08-14 | Stop reason: SDUPTHER

## 2024-08-14 ENCOUNTER — OFFICE VISIT (OUTPATIENT)
Dept: CARDIOLOGY | Facility: CLINIC | Age: 79
End: 2024-08-14
Payer: MEDICARE

## 2024-08-14 VITALS
WEIGHT: 169 LBS | SYSTOLIC BLOOD PRESSURE: 98 MMHG | OXYGEN SATURATION: 90 % | BODY MASS INDEX: 27.16 KG/M2 | DIASTOLIC BLOOD PRESSURE: 42 MMHG | HEART RATE: 72 BPM | HEIGHT: 66 IN

## 2024-08-14 DIAGNOSIS — I10 ESSENTIAL HYPERTENSION: Primary | ICD-10-CM

## 2024-08-14 DIAGNOSIS — E78.2 MIXED DYSLIPIDEMIA: ICD-10-CM

## 2024-08-14 DIAGNOSIS — R94.39 ABNORMAL NUCLEAR STRESS TEST: ICD-10-CM

## 2024-08-14 RX ORDER — ARIPIPRAZOLE 2 MG/1
4 TABLET ORAL DAILY
Qty: 180 TABLET | Refills: 0 | OUTPATIENT
Start: 2024-08-14

## 2024-08-14 RX ORDER — MIDODRINE HYDROCHLORIDE 2.5 MG/1
2.5 TABLET ORAL
Qty: 90 TABLET | Refills: 3 | Status: SHIPPED | OUTPATIENT
Start: 2024-08-14

## 2024-08-14 RX ORDER — ARIPIPRAZOLE 2 MG/1
2 TABLET ORAL 2 TIMES DAILY
Start: 2024-08-14

## 2024-08-14 NOTE — PROGRESS NOTES
Chief Complaint  Follow-up, Hypertension, and Slow Heart Rate    Subjective        History of Present Illness  Nelson Wang presents to [facility] CARDIOLOGY for follow up.   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  He feels good for the most part.  He notes no further episodes of chest pain.  He reports improvement in his shortness of breath.  He does report very labile blood pressures which can be in the 70s after dialysis and in the 200s on days that he does not have dialysis.  He takes as needed clonidine when his blood pressure is as high as the 200s.  He denies dizziness, lightheadedness, syncope or presyncope.      Past Medical History:   Diagnosis Date    Abnormal stress test     Anemia     IRON INFUSIONS WITH DIALYSIS    Anesthesia     WITH HIP REPLACEMENT DAUGHTER BELIEVES HAD SVT 2000    Ankle pain, right     Anxiety and depression     CKD (chronic kidney disease), stage IV     DIALYSIS [id number] SHILEY IN RIGHT CHEST    Diabetes     Gout     High cholesterol     History of peritoneal dialysis     HL (hearing loss)     Hyperkalemia     Hypertension     Hypothyroidism     Insomnia     Night terrors     Psoriasis     Psoriasis     Sleep walking     Thrombocytopenia     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    Vitiligo        ALLERGY  No Known Allergies     Past Surgical History:   Procedure Laterality Date    ANKLE SURGERY Right 11/1990    APPENDECTOMY N/A 1954    ARTERIOVENOUS FISTULA/SHUNT SURGERY Left 7/16/2024    Procedure: Creation of left arm arteriovenous fistula;  Surgeon: Moses Mir MD;  Location: [facility];  Service: Vascular;  Laterality: Left;    BRONCHOSCOPY N/A 03/01/2024    Procedure: BRONCHOSCOPY WITH  BAL AND WASHINGS;  Surgeon: Sandra Espinal MD;  Location: [facility] ENDOSCOPY;  Service: Pulmonary;  Laterality: N/A;  PNEUMONIA    CARDIAC CATHETERIZATION N/A 5/29/2024    Procedure: Left Heart Cath with possible coronary angioplasty;  Surgeon: Stephan Nichols MD;  Location: [facility] CATH INVASIVE LOCATION;  Service: Cardiology;  Laterality: N/A;    COLONOSCOPY N/A 06/2022    [facility]    HIP BIPOLAR REPLACEMENT Right 01/2000    INSERTION HEMODIALYSIS CATHETER Left 04/09/2024    Procedure: HEMODIALYSIS CATHETER INSERTION;  Surgeon: Jose Berry MD;  Location: [facility] OR;  Service: General;  Laterality: Left;    INSERTION HEMODIALYSIS CATHETER N/A 04/12/2024    Procedure: Tunneled hemodialysis catheter insertion;  Surgeon: Enrique Vinson MD;  Location: [facility] OR;  Service: Vascular;  Laterality: N/A;    INSERTION PERITONEAL DIALYSIS CATHETER N/A 03/27/2023    Procedure: LAPAROSCOPIC INSERTION PERITONEAL DIALYSIS CATHETER, LAPAROSCOPIC OMENTOPEXY WITH LYSIS OF ADHESIONS;  Surgeon: Jose Berry MD;  Location: [facility] OR;  Service: General;  Laterality: N/A;    INSERTION PERITONEAL DIALYSIS CATHETER Left 07/23/2023    Procedure: REVISION OF PERITONEAL DIALYSIS CATHETER;  Surgeon: Radha Oreilly MD;  Location: [facility] MAIN OR;  Service: General;  Laterality: Left;    REMOVAL PERITONEAL DIALYSIS CATHETER N/A 04/09/2024    Procedure: REMOVAL PERITONEAL DIALYSIS CATHETER;  Surgeon: Jose Berry MD;  Location: [facility] MAIN OR;  Service: General;  Laterality: N/A;    RENAL BIOPSY Left 07/15/2022    WRIST SURGERY      UNSURE WHICH SIDE DAUGHTER REPORTS HAD SEVERE  CUT FROM WINDOW AND THEY HAD TO DO RECONSTRUCTIVE SURGERY WITH VESSELS AND NERVES        Social History     Socioeconomic History    Marital status:    Tobacco Use    Smoking status: Former     Current packs/day: 0.00     Average packs/day: 1 pack/day for 10.0 years (10.0 ttl pk-yrs)     Types:  Cigarettes     Start date:      Quit date:      Years since quittin.6     Passive exposure: Past    Smokeless tobacco: Never    Tobacco comments:     quit 25 years ago, chews nicotine gum in past   Vaping Use    Vaping status: Never Used   Substance and Sexual Activity    Alcohol use: Yes     Comment: 1 DRINK/DAY-rarely    Drug use: Never    Sexual activity: Defer       Family History   Problem Relation Age of Onset    Diabetes Mother     Heart disease Father     Cancer Sister 35    Diabetes Sister     Diabetes Brother     Sleep apnea Paternal Aunt     Heart disease Paternal Uncle     Sleep apnea Daughter     Malig Hyperthermia Neg Hx         Current Outpatient Medications on File Prior to Visit   Medication Sig    allopurinol (ZYLOPRIM) 100 MG tablet Take 1 tablet by mouth Daily. As needed    ARIPiprazole (ABILIFY) 2 MG tablet Take 1 tablet by mouth 2 (Two) Times a Day.    atorvastatin (LIPITOR) 40 MG tablet Take 1 tablet by mouth Daily.    carvedilol (COREG) 25 MG tablet Take 1 tablet by mouth 2 (Two) Times a Day With Meals.    cloNIDine (CATAPRES) 0.2 MG tablet Take 1 tablet by mouth Every 8 (Eight) Hours As Needed for High Blood Pressure.    Insulin Glargine (LANTUS SOLOSTAR) 100 UNIT/ML injection pen Inject 10 Units under the skin into the appropriate area as directed Every Night.    Levothyroxine Sodium 175 MCG capsule Take 175 mcg by mouth Daily.    Lokelma 10 g packet Take 10 g by mouth Every Other Day. TAKES MON, WED, FRI, SUN    Methoxy PEG-Epoetin Beta (MIRCERA IJ) 30 mcg Every 14 (Fourteen) Days. RECEIVES DURING DIALYSIS    sertraline (ZOLOFT) 100 MG tablet Take 1 tablet by mouth Every Night.    sevelamer (RENVELA) 800 MG tablet Take 1 tablet by mouth 2 (Two) Times a Day.    torsemide (DEMADEX) 100 MG tablet Take 1 tablet by mouth Daily.    Turmeric (QC TUMERIC COMPLEX PO) Take 2 capsules by mouth 2 (Two) Times a Day.    Continuous Glucose  (Dexcom G7 ) device Use 1 Units  "Continuous. (Patient not taking: Reported on 8/14/2024)    Continuous Glucose Sensor (Dexcom G7 Sensor) misc Use 1 Units Continuous. (Patient not taking: Reported on 8/14/2024)    [DISCONTINUED] levothyroxine (SYNTHROID, LEVOTHROID) 150 MCG tablet Take 1 tablet by mouth Daily. (Patient not taking: Reported on 8/14/2024)    [DISCONTINUED] losartan (COZAAR) 50 MG tablet Take 1 tablet by mouth Every Night. (Patient not taking: Reported on 8/14/2024)    [DISCONTINUED] oxyCODONE-acetaminophen (Percocet) 5-325 MG per tablet Take 1 tablet by mouth Every 6 (Six) Hours As Needed for Severe Pain. (Patient not taking: Reported on 8/14/2024)     No current facility-administered medications on file prior to visit.       Objective   Vitals:    08/14/24 0922   BP: 98/42   BP Location: Right arm   Patient Position: Sitting   Pulse: 72   SpO2: 90%   Weight: 76.7 kg (169 lb)   Height: 167.6 cm (66\")       Physical Exam  Constitutional:       General: He is awake. He is not in acute distress.     Appearance: Normal appearance.   HENT:      Head: Normocephalic.      Nose: Nose normal. No congestion.   Eyes:      Extraocular Movements: Extraocular movements intact.      Conjunctiva/sclera: Conjunctivae normal.      Pupils: Pupils are equal, round, and reactive to light.   Neck:      Thyroid: No thyromegaly.      Vascular: No JVD.   Cardiovascular:      Rate and Rhythm: Normal rate and regular rhythm.      Chest Wall: PMI is not displaced.      Pulses: Normal pulses.      Heart sounds: Normal heart sounds, S1 normal and S2 normal. No murmur heard.     No friction rub. No gallop. No S3 or S4 sounds.   Pulmonary:      Effort: Pulmonary effort is normal.      Breath sounds: Normal breath sounds. No wheezing, rhonchi or rales.   Abdominal:      General: Bowel sounds are normal.      Palpations: Abdomen is soft.      Tenderness: There is no abdominal tenderness.   Musculoskeletal:      Cervical back: No tenderness.      Right lower leg: No " edema.      Left lower leg: No edema.   Lymphadenopathy:      Cervical: No cervical adenopathy.   Skin:     General: Skin is warm and dry.      Capillary Refill: Capillary refill takes less than 2 seconds.      Coloration: Skin is not cyanotic.      Findings: No petechiae or rash.      Nails: There is no clubbing.   Neurological:      Mental Status: He is alert.   Psychiatric:         Mood and Affect: Mood normal.         Behavior: Behavior is cooperative.           Result Review     The following data was reviewed by BECKY Guan on 08/14/24.      CMP          7/16/2024    08:07 8/1/2024    00:00 8/8/2024    00:00   CMP   Glucose 159      BUN 59      Creatinine 5.35      EGFR 10.3      Sodium 137      Potassium 5.5  5.3     5.2       Chloride 102      Calcium 8.9   9.4       BUN/Creatinine Ratio 11.0      Anion Gap 12.9         Details          This result is from an external source.             CBC w/diff          7/25/2024    00:00 8/1/2024    00:00 8/8/2024    00:00   CBC w/Diff   Hemoglobin 10.2        30.6     10.6        31.8     9.9        29.7          Details          This result is from an external source.              Lipid Panel          12/6/2023    09:51 3/22/2024    10:19 7/16/2024    08:07   Lipid Panel   Total Cholesterol 113  147  138    Triglycerides 64  118  111    HDL Cholesterol 49  80  56    VLDL Cholesterol 14  20  20    LDL Cholesterol  50  47  62    LDL/HDL Ratio 1.04  0.54  1.07        Results for orders placed during the hospital encounter of 02/16/24    Adult Transthoracic Echo Complete W/ Cont if Necessary Per Protocol    Interpretation Summary    Left ventricular systolic function is normal. Calculated left ventricular EF = 56.6%    Left ventricular wall thickness is consistent with mild concentric hypertrophy.    Left ventricular diastolic function is consistent with (grade I) impaired relaxation and age.    Aortic valve sclerosis with minimal aortic valve stenosis is  present.    Results for orders placed during the hospital encounter of 05/17/24    Stress Test With Myocardial Perfusion One Day    Interpretation Summary  Patient received Lexiscan 0.4 mg IV infusion over 10 seconds.  Baseline ECG shows normal sinus rhythm.  At peak stress, no ischemic ST-T changes were noted.  No significant arrhythmias were seen.  Myocardial perfusion was abnormal demonstrating medium area of severe perfusion defect in the distal inferior/inferoapical segment with minimal reversibility on resting images, more likely related to attenuation artifact.  A small area of mild to moderate perfusion defect was noted in the distal anterior segment with reversibility on resting images which could represent myocardial ischemia in the right clinical setting.  The left ventricular was normal in size with moderately reduced systolic function.  Calculated LVEF is 36%.  Hypokinesis of the aforementioned segments was noted.  Impressions are consistent with high risk study.  Consider further evaluation if clinically indicated.    No results found for this or any previous visit.          Procedures    Assessment & Plan  Diagnoses and all orders for this visit:    1. Essential hypertension (Primary)    2. Mixed dyslipidemia    3. Abnormal nuclear stress test    Other orders  -     midodrine (PROAMATINE) 2.5 MG tablet; Take 1 tablet by mouth 3 (Three) Times a Day Before Meals. On dialysis days  Dispense: 90 tablet; Refill: 3      Assessment & Plan      1.  Blood pressure is labile.  Losartan has been discontinued due to low hypotension.  He does have very low blood pressures on days that he has dialysis.  He was advised to take midodrine 2.5 mg 3 times daily on days that he has dialysis to mitigate his hypotension.  In the meantime he is on clonidine as needed for systolic blood pressure greater than 160 to be utilized on days when he does not have dialysis.  We tried some losartan daily but this caused his blood  pressure to drop too much and he was symptomatic with it.  2.  Continue statin therapy.  3.  Status post recent left heart catheterization that demonstrated minimal coronary artery disease.  Patient was reassured.          The medical services provided during this encounter are part of ongoing care related to this patient's single serious condition or complex condition.      Follow Up   Return in about 3 months (around 11/14/2024) for With Dr. Nichols.    Patient was given instructions and counseling regarding his condition or for health maintenance advice. Please see specific information pulled into the AVS if appropriate.     Aylin Nicole, APRN  08/14/24  09:58 EDT    Dictated Utilizing Dragon Dictation

## 2024-08-23 ENCOUNTER — HOSPITAL ENCOUNTER (OUTPATIENT)
Dept: CT IMAGING | Facility: HOSPITAL | Age: 79
Discharge: HOME | End: 2024-08-23
Payer: MEDICARE

## 2024-08-23 DIAGNOSIS — J98.4 RESTRICTIVE LUNG DISEASE: ICD-10-CM

## 2024-08-23 PROCEDURE — 71250 CT THORAX DX C-: CPT

## 2024-08-26 ENCOUNTER — TELEPHONE (OUTPATIENT)
Dept: PULMONOLOGY | Facility: CLINIC | Age: 79
End: 2024-08-26
Payer: MEDICARE

## 2024-08-26 ENCOUNTER — PREP FOR SURGERY (OUTPATIENT)
Dept: OTHER | Facility: HOSPITAL | Age: 79
End: 2024-08-26
Payer: MEDICARE

## 2024-08-26 DIAGNOSIS — R93.89 ABNORMAL CHEST CT: Primary | ICD-10-CM

## 2024-08-26 NOTE — TELEPHONE ENCOUNTER
PATIENT'S DAUGHTER CALLED AND HAS QUESTIONS ABOUT PATIENTS CT AND PATIENT STILL NOT COUGHING ANYTHING UP TO CLEAR HIS LUNGS.    PLEASE CALL DAUGHTER [phone] TO DISCUSS AS THE FOLLOW UP WITH DR. HORTON IS NOT FOR A FEW WEEKS.

## 2024-08-26 NOTE — TELEPHONE ENCOUNTER
Called and spoke with patient's daughter regarding results of CT scan 8/23/2024.  We discussed that his scan did show some interstitial lung disease with bronchiectasis.  There were also areas of tree-in-bud opacities suggesting infectious/inflammatory process.  There was a new 10 mm nodule in the left lower lobe.  Per patient's daughter, he has been having a deep, nonproductive cough.  Spoke with Dr. Espinal, who recommended patient have bronchoscopy given findings of chest CT and patient's symptoms.  Regular bronchoscopy ordered, patient to await call from scheduling.

## 2024-08-27 ENCOUNTER — TELEPHONE (OUTPATIENT)
Dept: PULMONOLOGY | Facility: CLINIC | Age: 79
End: 2024-08-27
Payer: MEDICARE

## 2024-08-27 PROBLEM — R93.89 ABNORMAL CHEST CT: Status: ACTIVE | Noted: 2024-08-26

## 2024-08-27 NOTE — TELEPHONE ENCOUNTER
Spoke with Misty denis to get patient scheduled for 8/30 arrive at 9:30am. Notified patient's daughter Jose of date/time and instructions.

## 2024-08-28 ENCOUNTER — OFFICE VISIT (OUTPATIENT)
Dept: VASCULAR SURGERY | Facility: HOSPITAL | Age: 79
End: 2024-08-28
Payer: MEDICARE

## 2024-08-28 VITALS
HEART RATE: 82 BPM | DIASTOLIC BLOOD PRESSURE: 60 MMHG | OXYGEN SATURATION: 94 % | SYSTOLIC BLOOD PRESSURE: 112 MMHG | RESPIRATION RATE: 18 BRPM | TEMPERATURE: 97.8 F

## 2024-08-28 DIAGNOSIS — N18.6 ESRD ON DIALYSIS: Primary | ICD-10-CM

## 2024-08-28 DIAGNOSIS — Z99.2 ESRD ON DIALYSIS: Primary | ICD-10-CM

## 2024-08-28 PROCEDURE — G0463 HOSPITAL OUTPT CLINIC VISIT: HCPCS | Performed by: SURGERY

## 2024-08-28 RX ORDER — LIDOCAINE AND PRILOCAINE 25; 25 MG/G; MG/G
CREAM TOPICAL ONCE
Qty: 1 KIT | Refills: 3 | Status: SHIPPED | OUTPATIENT
Start: 2024-08-28 | End: 2024-08-28

## 2024-08-28 NOTE — PROGRESS NOTES
[facility]     Progress Note    Patient Name: Nelson Wang  : 1945  MRN: [id number]  Primary Care Physician:  Domingo Bravo MD  Date of admission: (Not on file)  Chief Complaint:    Chief Complaint   Patient presents with    Hemodialysis Access     Patient is here as a follow up s/p creation of left arm arteriovenous fistula . Positive for thrill and bruit.        Subjective   Subjective     Nelson Wang is a 79 y.o. male presents for follow-up for left brachiocephalic AV fistula creation performed Dr. Mir on 2024. He has an excellent thrill and bruit, well healed surgical incision. He denies and pain or numbness. He is currently receiving hemodialysis in [place] through a C however, his daughter will eventually be performing at home.  No complaints at this time    Objective   Objective     Vitals:   Temp:  [97.8 °F (36.6 °C)] 97.8 °F (36.6 °C)  Heart Rate:  [82] 82  Resp:  [18] 18  BP: (112)/(60) 112/60    Physical Exam   General: Alert, no acute distress.   Extremities:Left arm: palpable thrill and bruit.  Surgical incision intact, no edema. Motor and sensory skills intact. +2 palpable radial pulse.   Neuro: no Gross deficits.     Result Review    Result Review:  I have personally reviewed the results from the time of this admission to 2024 15:19 EDT and agree with these findings:  []  Laboratory  []  Microbiology  []  Radiology  []  EKG/Telemetry   []  Cardiology/Vascular   []  Pathology  []  Old records  []  Other:    Most notable findings include:     Assessment & Plan   Assessment / Plan     Assessment/Plan:  Diagnoses and all orders for this visit:    1. ESRD on dialysis (Primary)  -     Discontinue: lidocaine-prilocaine (EMLA) 2.5-2.5 % cream; Apply  topically to the appropriate area as directed 1 (One) Time for 1 dose. Apply 30 min prior to treatment.  Dispense: 1 kit; Refill: 3  -     lidocaine-prilocaine (EMLA) 2.5-2.5 % cream; Apply  topically to the appropriate area as  directed 1 (One) Time for 1 dose. Apply 30 min prior to treatment.  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 Mir on 7/16/2024.  The fistula has continued to mature, excellent thrill and bruit and appears ready to be accessed per hemodialysis protocol.      I have given him a prescription for Emla cream to be given to the hemodialysis center in [place].  I have explained that she should apply the cream approximately 30 minutes prior to his procedure/treatment.    I have answered all of his questions and he is in agreement with the plan at this time.  Thank you for allowing me to participate in your patient's care.      Active Hospital Problems:  There are no active hospital problems to display for this patient.          Electronically signed by BECKY Brown, 08/28/24, 3:19 PM EDT.

## 2024-08-29 ENCOUNTER — ANESTHESIA EVENT (OUTPATIENT)
Dept: GASTROENTEROLOGY | Facility: HOSPITAL | Age: 79
End: 2024-08-29
Payer: MEDICARE

## 2024-08-29 NOTE — ANESTHESIA PREPROCEDURE EVALUATION
Anesthesia Evaluation     Nursing notes reviewed   NPO Solid Status: > 8 hours  NPO Liquid Status: > 2 hours           Airway   Mallampati: II  TM distance: >3 FB  Neck ROM: full  No difficulty expected  Dental      Pulmonary     breath sounds clear to auscultation  (+) pneumonia ,shortness of breath  Cardiovascular     Rhythm: regular  Rate: normal    (+) hypertension, hyperlipidemia      Neuro/Psych  (+) psychiatric history  GI/Hepatic/Renal/Endo    (+) GERD, renal disease-, diabetes mellitus, thyroid problem hypothyroidism    Musculoskeletal     Abdominal    Substance History      OB/GYN          Other        ROS/Med Hx Other: Verbal cardiac clearance for Aylin Nicole BECKY on 8/29/24 @1500    EKG 3/24/24: - ABNORMAL ECG -  Sinus rhythm  Inferior infarct, old  Abnrm T, consider ischemia, anterolateral lds  When compared with ECG of 09-Mar-2024 6:34:34,    ECHO 2/16/24: ·  Left ventricular systolic function is normal. Calculated left ventricular EF = 56.6%  ·  Left ventricular wall thickness is consistent with mild concentric hypertrophy.  ·  Left ventricular diastolic function is consistent with (grade I) impaired relaxation and age.  ·  Aortic valve sclerosis with minimal aortic valve stenosis is present.    5/17/24 stress test: Myocardial perfusion was abnormal demonstrating medium area of severe perfusion defect in the distal inferior/inferoapical segment with minimal reversibility on resting images, more likely related to attenuation artifact.  A small area of mild to moderate perfusion defect was noted in the distal anterior segment with reversibility on resting images which could represent myocardial ischemia in the right clinical setting.  The left ventricular was normal in size with moderately reduced systolic function.  Calculated LVEF is 36%.  Hypokinesis of the aforementioned segments was noted.  Impressions are consistent with high risk study.  Consider further evaluation if clinically  indicated.                         Anesthesia Plan    ASA 3     general   total IV anesthesia  intravenous induction           CODE STATUS:

## 2024-08-30 ENCOUNTER — ANESTHESIA (OUTPATIENT)
Dept: GASTROENTEROLOGY | Facility: HOSPITAL | Age: 79
End: 2024-08-30
Payer: MEDICARE

## 2024-08-30 ENCOUNTER — HOSPITAL ENCOUNTER (OUTPATIENT)
Facility: HOSPITAL | Age: 79
Setting detail: HOSPITAL OUTPATIENT SURGERY
Discharge: HOME | End: 2024-08-30
Attending: STUDENT IN AN ORGANIZED HEALTH CARE EDUCATION/TRAINING PROGRAM | Admitting: STUDENT IN AN ORGANIZED HEALTH CARE EDUCATION/TRAINING PROGRAM
Payer: MEDICARE

## 2024-08-30 VITALS
WEIGHT: 166.67 LBS | BODY MASS INDEX: 26.79 KG/M2 | OXYGEN SATURATION: 96 % | DIASTOLIC BLOOD PRESSURE: 65 MMHG | HEART RATE: 78 BPM | SYSTOLIC BLOOD PRESSURE: 127 MMHG | RESPIRATION RATE: 13 BRPM | HEIGHT: 66 IN | TEMPERATURE: 97.8 F

## 2024-08-30 DIAGNOSIS — R93.89 ABNORMAL CHEST CT: ICD-10-CM

## 2024-08-30 LAB
ACB CMPLX DNA BAL NAA+NON-PRB-NCNCRNG: NOT DETECTED
ACB CMPLX DNA BAL NAA+NON-PRB-NCNCRNG: NOT DETECTED
BLACTX-M ISLT/SPM QL: NORMAL
BLACTX-M ISLT/SPM QL: NORMAL
BLAIMP ISLT/SPM QL: NORMAL
BLAIMP ISLT/SPM QL: NORMAL
BLAKPC ISLT/SPM QL: NORMAL
BLAKPC ISLT/SPM QL: NORMAL
BLAOXA-48-LIKE ISLT/SPM QL: NORMAL
BLAOXA-48-LIKE ISLT/SPM QL: NORMAL
BLAVIM ISLT/SPM QL: NORMAL
BLAVIM ISLT/SPM QL: NORMAL
C PNEUM DNA NPH QL NAA+NON-PROBE: NOT DETECTED
C PNEUM DNA NPH QL NAA+NON-PROBE: NOT DETECTED
CILIATED BAL QL: 17 %
E CLOAC COMP DNA BAL NAA+NON-PRB-NCNCRNG: NOT DETECTED
E CLOAC COMP DNA BAL NAA+NON-PRB-NCNCRNG: NOT DETECTED
E COLI DNA BAL NAA+NON-PRB-NCNCRNG: NOT DETECTED
E COLI DNA BAL NAA+NON-PRB-NCNCRNG: NOT DETECTED
FLUAV SUBTYP SPEC NAA+PROBE: NOT DETECTED
FLUAV SUBTYP SPEC NAA+PROBE: NOT DETECTED
FLUBV RNA ISLT QL NAA+PROBE: NOT DETECTED
FLUBV RNA ISLT QL NAA+PROBE: NOT DETECTED
GLUCOSE BLDC GLUCOMTR-MCNC: 120 MG/DL (ref 70–99)
GP B STREP DNA BAL NAA+NON-PRB-NCNCRNG: NOT DETECTED
GP B STREP DNA BAL NAA+NON-PRB-NCNCRNG: NOT DETECTED
HADV DNA SPEC NAA+PROBE: NOT DETECTED
HADV DNA SPEC NAA+PROBE: NOT DETECTED
HAEM INFLU DNA BAL NAA+NON-PRB-NCNCRNG: NOT DETECTED
HAEM INFLU DNA BAL NAA+NON-PRB-NCNCRNG: NOT DETECTED
HCOV RNA LOWER RESP QL NAA+NON-PROBE: NOT DETECTED
HCOV RNA LOWER RESP QL NAA+NON-PROBE: NOT DETECTED
HMPV RNA NPH QL NAA+NON-PROBE: NOT DETECTED
HMPV RNA NPH QL NAA+NON-PROBE: NOT DETECTED
HPIV RNA LOWER RESP QL NAA+NON-PROBE: NOT DETECTED
HPIV RNA LOWER RESP QL NAA+NON-PROBE: NOT DETECTED
K AEROGENES DNA BAL NAA+NON-PRB-NCNCRNG: NOT DETECTED
K AEROGENES DNA BAL NAA+NON-PRB-NCNCRNG: NOT DETECTED
K OXYTOCA DNA BAL NAA+NON-PRB-NCNCRNG: NOT DETECTED
K OXYTOCA DNA BAL NAA+NON-PRB-NCNCRNG: NOT DETECTED
K PNEU GRP DNA BAL NAA+NON-PRB-NCNCRNG: NOT DETECTED
K PNEU GRP DNA BAL NAA+NON-PRB-NCNCRNG: NOT DETECTED
L PNEUMO DNA LOWER RESP QL NAA+NON-PROBE: NOT DETECTED
L PNEUMO DNA LOWER RESP QL NAA+NON-PROBE: NOT DETECTED
LYMPHOCYTES NFR FLD MANUAL: 17 %
LYMPHOCYTES NFR FLD MANUAL: 79 %
M CATARRHALIS DNA BAL NAA+NON-PRB-NCNCRNG: NOT DETECTED
M CATARRHALIS DNA BAL NAA+NON-PRB-NCNCRNG: NOT DETECTED
M PNEUMO IGG SER IA-ACNC: NOT DETECTED
M PNEUMO IGG SER IA-ACNC: NOT DETECTED
MACROPHAGE FLUID: 12 %
MECA+MECC ISLT/SPM QL: NORMAL
MECA+MECC ISLT/SPM QL: NORMAL
NDM GENE: NORMAL
NDM GENE: NORMAL
NEUTROPHILS NFR FLD MANUAL: 66 %
NEUTROPHILS NFR FLD MANUAL: 9 %
P AERUGINOSA DNA BAL NAA+NON-PRB-NCNCRNG: NOT DETECTED
P AERUGINOSA DNA BAL NAA+NON-PRB-NCNCRNG: NOT DETECTED
PROTEUS SP DNA BAL NAA+NON-PRB-NCNCRNG: NOT DETECTED
PROTEUS SP DNA BAL NAA+NON-PRB-NCNCRNG: NOT DETECTED
RHINOVIRUS RNA SPEC NAA+PROBE: NOT DETECTED
RHINOVIRUS RNA SPEC NAA+PROBE: NOT DETECTED
RSV RNA NPH QL NAA+NON-PROBE: NOT DETECTED
RSV RNA NPH QL NAA+NON-PROBE: NOT DETECTED
S AUREUS DNA BAL NAA+NON-PRB-NCNCRNG: NOT DETECTED
S AUREUS DNA BAL NAA+NON-PRB-NCNCRNG: NOT DETECTED
S MARCESCENS DNA BAL NAA+NON-PRB-NCNCRNG: NOT DETECTED
S MARCESCENS DNA BAL NAA+NON-PRB-NCNCRNG: NOT DETECTED
S PNEUM DNA BAL NAA+NON-PRB-NCNCRNG: NOT DETECTED
S PNEUM DNA BAL NAA+NON-PRB-NCNCRNG: NOT DETECTED
S PYO DNA BAL NAA+NON-PRB-NCNCRNG: NOT DETECTED
S PYO DNA BAL NAA+NON-PRB-NCNCRNG: NOT DETECTED
VISUAL PRESENCE OF BLOOD: NORMAL
VISUAL PRESENCE OF BLOOD: NORMAL

## 2024-08-30 PROCEDURE — 25810000003 SODIUM CHLORIDE 0.9 % SOLUTION

## 2024-08-30 PROCEDURE — 87252 VIRUS INOCULATION TISSUE: CPT | Performed by: STUDENT IN AN ORGANIZED HEALTH CARE EDUCATION/TRAINING PROGRAM

## 2024-08-30 PROCEDURE — 87206 SMEAR FLUORESCENT/ACID STAI: CPT | Performed by: STUDENT IN AN ORGANIZED HEALTH CARE EDUCATION/TRAINING PROGRAM

## 2024-08-30 PROCEDURE — 87633 RESP VIRUS 12-25 TARGETS: CPT | Performed by: STUDENT IN AN ORGANIZED HEALTH CARE EDUCATION/TRAINING PROGRAM

## 2024-08-30 PROCEDURE — 87070 CULTURE OTHR SPECIMN AEROBIC: CPT | Performed by: STUDENT IN AN ORGANIZED HEALTH CARE EDUCATION/TRAINING PROGRAM

## 2024-08-30 PROCEDURE — 87071 CULTURE AEROBIC QUANT OTHER: CPT | Performed by: STUDENT IN AN ORGANIZED HEALTH CARE EDUCATION/TRAINING PROGRAM

## 2024-08-30 PROCEDURE — 25010000002 PROPOFOL 500 MG/50ML EMULSION: Performed by: NURSE ANESTHETIST, CERTIFIED REGISTERED

## 2024-08-30 PROCEDURE — 87205 SMEAR GRAM STAIN: CPT | Performed by: STUDENT IN AN ORGANIZED HEALTH CARE EDUCATION/TRAINING PROGRAM

## 2024-08-30 PROCEDURE — 82948 REAGENT STRIP/BLOOD GLUCOSE: CPT

## 2024-08-30 PROCEDURE — 87102 FUNGUS ISOLATION CULTURE: CPT | Performed by: STUDENT IN AN ORGANIZED HEALTH CARE EDUCATION/TRAINING PROGRAM

## 2024-08-30 PROCEDURE — 87496 CYTOMEG DNA AMP PROBE: CPT | Performed by: STUDENT IN AN ORGANIZED HEALTH CARE EDUCATION/TRAINING PROGRAM

## 2024-08-30 PROCEDURE — S0260 H&P FOR SURGERY: HCPCS | Performed by: STUDENT IN AN ORGANIZED HEALTH CARE EDUCATION/TRAINING PROGRAM

## 2024-08-30 PROCEDURE — 31624 DX BRONCHOSCOPE/LAVAGE: CPT | Performed by: STUDENT IN AN ORGANIZED HEALTH CARE EDUCATION/TRAINING PROGRAM

## 2024-08-30 PROCEDURE — 88108 CYTOPATH CONCENTRATE TECH: CPT | Performed by: STUDENT IN AN ORGANIZED HEALTH CARE EDUCATION/TRAINING PROGRAM

## 2024-08-30 PROCEDURE — 25010000002 PROPOFOL 10 MG/ML EMULSION: Performed by: NURSE ANESTHETIST, CERTIFIED REGISTERED

## 2024-08-30 PROCEDURE — 87116 MYCOBACTERIA CULTURE: CPT | Performed by: STUDENT IN AN ORGANIZED HEALTH CARE EDUCATION/TRAINING PROGRAM

## 2024-08-30 PROCEDURE — 89051 BODY FLUID CELL COUNT: CPT | Performed by: STUDENT IN AN ORGANIZED HEALTH CARE EDUCATION/TRAINING PROGRAM

## 2024-08-30 RX ORDER — PHENYLEPHRINE HCL IN 0.9% NACL 1 MG/10 ML
SYRINGE (ML) INTRAVENOUS AS NEEDED
Status: DISCONTINUED | OUTPATIENT
Start: 2024-08-30 | End: 2024-08-30 | Stop reason: SURG

## 2024-08-30 RX ORDER — SODIUM CHLORIDE, SODIUM LACTATE, POTASSIUM CHLORIDE, CALCIUM CHLORIDE 600; 310; 30; 20 MG/100ML; MG/100ML; MG/100ML; MG/100ML
30 INJECTION, SOLUTION INTRAVENOUS CONTINUOUS
Status: DISCONTINUED | OUTPATIENT
Start: 2024-08-30 | End: 2024-08-30 | Stop reason: HOSPADM

## 2024-08-30 RX ORDER — LIDOCAINE HYDROCHLORIDE 40 MG/ML
INJECTION, SOLUTION RETROBULBAR AS NEEDED
Status: DISCONTINUED | OUTPATIENT
Start: 2024-08-30 | End: 2024-08-30 | Stop reason: HOSPADM

## 2024-08-30 RX ORDER — PROPOFOL 10 MG/ML
INJECTION, EMULSION INTRAVENOUS CONTINUOUS PRN
Status: DISCONTINUED | OUTPATIENT
Start: 2024-08-30 | End: 2024-08-30 | Stop reason: SURG

## 2024-08-30 RX ORDER — LIDOCAINE HYDROCHLORIDE 20 MG/ML
INJECTION, SOLUTION EPIDURAL; INFILTRATION; INTRACAUDAL; PERINEURAL AS NEEDED
Status: DISCONTINUED | OUTPATIENT
Start: 2024-08-30 | End: 2024-08-30 | Stop reason: SURG

## 2024-08-30 RX ORDER — PROPOFOL 10 MG/ML
VIAL (ML) INTRAVENOUS AS NEEDED
Status: DISCONTINUED | OUTPATIENT
Start: 2024-08-30 | End: 2024-08-30 | Stop reason: SURG

## 2024-08-30 RX ORDER — SODIUM CHLORIDE 9 MG/ML
50 INJECTION, SOLUTION INTRAVENOUS CONTINUOUS
Status: DISCONTINUED | OUTPATIENT
Start: 2024-08-30 | End: 2024-08-30 | Stop reason: HOSPADM

## 2024-08-30 RX ADMIN — LIDOCAINE HYDROCHLORIDE 80 MG: 20 INJECTION, SOLUTION EPIDURAL; INFILTRATION; INTRACAUDAL; PERINEURAL at 12:25

## 2024-08-30 RX ADMIN — SODIUM CHLORIDE 50 ML/HR: 9 INJECTION, SOLUTION INTRAVENOUS at 11:24

## 2024-08-30 RX ADMIN — Medication 100 MCG: at 12:28

## 2024-08-30 RX ADMIN — PROPOFOL 50 MG: 10 INJECTION, EMULSION INTRAVENOUS at 12:25

## 2024-08-30 RX ADMIN — PROPOFOL 150 MCG/KG/MIN: 10 INJECTION, EMULSION INTRAVENOUS at 12:25

## 2024-08-30 NOTE — CONSULTS
79-year-old male with restrictive lung disease, ESRD on HD, tobacco use in remission, history of multifocal pneumonia here for his bronchoscopy procedure today. Patient is still having chronic cough that is mostly nonproductive.  He does report some shortness of breath and dyspnea on exertion. He has subjective low-grade fevers at home.  Family members at bedside.  His most recent chest CT done 8/23 showed bronchiectasis in the lower lobes.  There is some tree-in-bud opacities/densities in the upper peripheral lobes.  Most recent labs show hemoglobin 10.6.     ROS  Vital Signs Reviewed   Constitutional symptoms: Directed low-grade fevers, denied complaints   Ear, nose, throat: Denied complaints  Cardiovascular:  Denied complaints  Respiratory: + Cough, shortness of breath; denied complaints  Gastrointestinal: Denied complaints  Musculoskeletal: Denied complaints  Neurologic: Denied complaints  Skin: Denied complaints    Physical Exam  Vital Signs Reviewed   General:  Alert, elderly male  HEENT:  PERRL, EOMI.    Neck:  No JVD, no thyromegaly  Lymph: no axillary, cervical, supraclavicular lymphadenopathy noted bilaterally  Chest:  Clear to auscultation bilaterally, no work of breathing noted  CV: RRR, no M/G/R, pulses 2+  Abd:  Soft, NT, ND, +BS  EXT:  no clubbing, no cyanosis, no edema, left upper extremity fistula  Neuro:  A&Ox3, CN grossly intact, no focal deficits.  Skin: No rashes or lesions noted    A/P  -Will proceed with bronchoscopy with airway clearance and BAL  -Risk and benefits explained to patient. Risk including bleeding, infection, pneumothorax, and even death can occur. Patient expressed understanding and would like to proceed.  -Patient is not on a blood thinner at this time  -Will follow-up patient outpatient    Electronically signed by Sandra Espinal MD, 08/30/24, 12:19 PM EDT.

## 2024-08-30 NOTE — OP NOTE
Bronchoscopy Procedure Note    Procedure:  Bronchoscopy with bronchoalveolar lavage   Bronchoscopy with bronchial washings tracheobronchial tree  Airway inspection with airway clearance of secretions.     Pre-Operative Diagnosis:  Chronic cough, shortness of breath, abnormal chest CT  Post-Operative Diagnosis: Same    Indication:  Chronic cough, shortness of breath, abnormal chest CT    Anesthesia: MAC anesthesia    Procedure Details: Patient was consented for the procedure with all risks and benefits of the procedure explained in detail. Patient was given the opportunity to ask questions and all concerns were answered.    The bronchoscope was inserted into the main airway via the mouth. An anatomical survey was done of the main airways and the subsegmental bronchus. The findings are reported below. A bronchoalveolar lavage was performed using 30 mL aliquots of normal saline instilled into the airways then aspirated back from FRED of lung with 25 mL serosanguineous fluid in return. Another bronchoalveolar lavage was performed using 30 mL aliquots of normal saline instilled into the airways then aspirated back from RUL of lung with 25 mL serosanguineous fluid in return. Airway clearance of lung performed with suctioning and removal of secretions and mucous plugs.     Findings:  Very friable mucosa, easy bleeding with suction  Scattered purulent secretions bilaterally    Estimated Blood Loss: 15 mL    Specimens:  BAL RUL and FRED  Bronchial washings tracheobronchial tree    Complications: None; patient tolerated the procedure well.    Disposition: To home    Patient tolerated the procedure well.    Electronically signed by Sandra Espinal MD, 8/30/2024, 12:50 EDT.

## 2024-08-30 NOTE — ANESTHESIA POSTPROCEDURE EVALUATION
Patient: Nelson Wang    Procedure Summary       Date: 08/30/24 Room / Location: [facility] ENDOSCOPY 3 / [facility] ENDOSCOPY    Anesthesia Start: 1221 Anesthesia Stop: 1247    Procedure: BRONCHOSCOPY WITH BALS AND WASHINGS (Bronchus) Diagnosis:       Abnormal chest CT      (Abnormal chest CT [R93.89])    Surgeons: Sandra Espinal MD Provider: Patricia Worrell CRNA    Anesthesia Type: general ASA Status: 3            Anesthesia Type: general    Vitals  Vitals Value Taken Time   /65 08/30/24 1302   Temp 36.6 °C (97.8 °F) 08/30/24 1301   Pulse 75 08/30/24 1303   Resp 13 08/30/24 1301   SpO2 95 % 08/30/24 1303   Vitals shown include unfiled device data.        Post Anesthesia Care and Evaluation    Post-procedure mental status: acceptable.  Pain management: satisfactory to patient    Airway patency: patent  Anesthetic complications: No anesthetic complications    Cardiovascular status: acceptable  Respiratory status: acceptable, spontaneous ventilation and room air  Hydration status: acceptable    Comments: Per chart review

## 2024-09-01 LAB
BACTERIA SPEC AEROBE CULT: NORMAL
BACTERIA SPEC AEROBE CULT: NORMAL
BACTERIA SPEC RESP CULT: NORMAL
GRAM STN SPEC: NORMAL

## 2024-09-04 LAB
CYTO UR: NORMAL
LAB AP CASE REPORT: NORMAL
LAB AP CLINICAL INFORMATION: NORMAL
PATH REPORT.FINAL DX SPEC: NORMAL
PATH REPORT.GROSS SPEC: NORMAL

## 2024-09-05 RX ORDER — ARIPIPRAZOLE 2 MG/1
2 TABLET ORAL 2 TIMES DAILY
Start: 2024-09-05

## 2024-09-05 NOTE — TELEPHONE ENCOUNTER
Caller: Jose Wang    Relationship: Emergency Contact    Best call back number: [phone]     Requested Prescriptions:   Requested Prescriptions     Pending Prescriptions Disp Refills    ARIPiprazole (ABILIFY) 2 MG tablet       Sig: Take 1 tablet by mouth 2 (Two) Times a Day.        Pharmacy where request should be sent: [facility] [address] [place] [address] [phone] [facility] [phone]      Last office visit with prescribing clinician: 7/17/2024   Last telemedicine visit with prescribing clinician: Visit date not found   Next office visit with prescribing clinician: 10/3/2024     PATIENTS DAUGHTER STATES THAT THE PATIENT WOULD LIKE A 90 DAY SUPPLY IF POSSIBLE    Does the patient have less than a 3 day supply:  [x] Yes  [] No    Would you like a call back once the refill request has been completed: [] Yes [] No    If the office needs to give you a call back, can they leave a voicemail: [] Yes [] No    Randell Tinajero Rep   09/05/24 12:38 EDT

## 2024-09-06 LAB
CMV DNA SPEC QL NAA+PROBE: NEGATIVE
CMV DNA SPEC QL NAA+PROBE: NEGATIVE
FUNGUS WND CULT: NORMAL
FUNGUS WND CULT: NORMAL
MYCOBACTERIUM SPEC CULT: NORMAL
MYCOBACTERIUM SPEC CULT: NORMAL
NIGHT BLUE STAIN TISS: NORMAL
SPECIMEN SOURCE: NORMAL
SPECIMEN SOURCE: NORMAL

## 2024-09-09 LAB
VIRUS SPEC CULT: NORMAL
VIRUS SPEC CULT: NORMAL

## 2024-09-12 ENCOUNTER — OFFICE VISIT (OUTPATIENT)
Dept: PULMONOLOGY | Facility: CLINIC | Age: 79
End: 2024-09-12
Payer: MEDICARE

## 2024-09-12 VITALS
HEIGHT: 66 IN | OXYGEN SATURATION: 90 % | DIASTOLIC BLOOD PRESSURE: 70 MMHG | TEMPERATURE: 98.4 F | SYSTOLIC BLOOD PRESSURE: 157 MMHG | BODY MASS INDEX: 27.32 KG/M2 | HEART RATE: 72 BPM | RESPIRATION RATE: 18 BRPM | WEIGHT: 169.97 LBS

## 2024-09-12 DIAGNOSIS — K21.9 GERD WITHOUT ESOPHAGITIS: ICD-10-CM

## 2024-09-12 DIAGNOSIS — R93.89 ABNORMAL CHEST CT: ICD-10-CM

## 2024-09-12 DIAGNOSIS — Z99.2 ESRD ON DIALYSIS: ICD-10-CM

## 2024-09-12 DIAGNOSIS — J18.9 RECURRENT PNEUMONIA: ICD-10-CM

## 2024-09-12 DIAGNOSIS — J69.0 ASPIRATION PNEUMONITIS: ICD-10-CM

## 2024-09-12 DIAGNOSIS — N18.6 ESRD ON DIALYSIS: ICD-10-CM

## 2024-09-12 DIAGNOSIS — R05.3 CHRONIC COUGH: Primary | ICD-10-CM

## 2024-09-12 DIAGNOSIS — R91.1 LUNG NODULE: ICD-10-CM

## 2024-09-12 DIAGNOSIS — Z23 INFLUENZA VACCINE NEEDED: ICD-10-CM

## 2024-09-12 RX ORDER — PANTOPRAZOLE SODIUM 40 MG/1
40 TABLET, DELAYED RELEASE ORAL DAILY
Qty: 60 TABLET | Refills: 3 | Status: SHIPPED | OUTPATIENT
Start: 2024-09-12

## 2024-09-12 RX ORDER — CLOPIDOGREL BISULFATE 75 MG/1
TABLET ORAL
COMMUNITY
Start: 2024-08-14

## 2024-09-12 RX ORDER — LIDOCAINE/PRILOCAINE 2.5 %-2.5%
CREAM (GRAM) TOPICAL
COMMUNITY
Start: 2024-08-30

## 2024-09-12 NOTE — PROGRESS NOTES
Primary Care Provider  Domingo Bravo MD   Referring Provider  No ref. provider found      Patient Complaint  Follow-up (3 Months), Restrictive lung disease, and Cough (Slight productive, no color)      Subjective          Nelson Wang presents to [facility]      History of Presenting Illness  Nelson Wang is a 79 y.o. male with history of recurrent multifocal pneumonia's, restrictive lung disease, ESRD on HD, previous tobacco use here for follow-up.    Patient had a bronchoscopy done for airway clearance on 8/30/2024.  Microbiology negative.  Since his [facility] he has been feeling better.  There is some concern for aspiration.  He denies dysphagia but his daughter says that he sometimes chokes on his food.  He denies fever, chills, cough and sputum production at this time.  He continues to use his Breztri 2 puffs twice daily with good control of his breathing issues.  There was a 10 mm nodule seen on the last CT.  I told him we will follow-up in November.  This is likely an infectious nodule versus scar. I have personally reviewed the review of systems, past family, social, medical and surgical histories; and agree with their findings.    Review of Systems  Constitutional:  No fever. No chills. No weakness.  ENT:  No sore throat, URI symptoms.   Cardiovascular:  No chest pain. No palpitations. No lower extremity edema.  Respiratory:  No shortness of breath, +cough, pleuritic chest pain. No hemoptysis. No dyspnea.   GI:  Normal appetite. No nausea, vomiting, diarrhea. No melena.  Musculoskeletal:  No arthralgias or myalgias.  Neurologic:  No weakness    Family History   Problem Relation Age of Onset    Diabetes Mother     Heart disease Father     Cancer Sister 35    Diabetes Sister     Diabetes Brother     Sleep apnea Paternal Aunt     Heart disease Paternal Uncle     Sleep apnea Daughter     Malig Hyperthermia Neg Hx         Social History     Socioeconomic  History    Marital status:    Tobacco Use    Smoking status: Former     Current packs/day: 0.00     Average packs/day: 1 pack/day for 10.0 years (10.0 ttl pk-yrs)     Types: Cigarettes     Start date:      Quit date:      Years since quittin.7     Passive exposure: Past    Smokeless tobacco: Never    Tobacco comments:     quit 25 years ago, chews nicotine gum in past   Vaping Use    Vaping status: Never Used   Substance and Sexual Activity    Alcohol use: Yes     Comment: 1 DRINK/DAY-rarely    Drug use: Never    Sexual activity: Defer        Past Medical History:   Diagnosis Date    Abnormal stress test     Anemia     IRON INFUSIONS WITH DIALYSIS    Anesthesia     WITH HIP REPLACEMENT DAUGHTER BELIEVES HAD SVT     Ankle pain, right     Anxiety and depression     CKD (chronic kidney disease), stage IV     DIALYSIS AGRY-[id number]-[id number] SHILEY IN RIGHT CHEST    Diabetes     Gout     High cholesterol     History of peritoneal dialysis     HL (hearing loss)     Hyperkalemia     Hypertension     Hypothyroidism     Insomnia     Night terrors     Psoriasis     Psoriasis     Sleep walking     Thrombocytopenia     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    Vitiligo         Immunization History   Administered Date(s) Administered    Arexvy (RSV, Adults 60+ yrs) 2023    COVID-19 (PFIZER) BIVALENT 12+YRS 2022, 2023    COVID-19 (PFIZER) Purple Cap Monovalent 2021, 10/04/2021, 2022    COVID-19 F23 (PFIZER) 12YRS+ (COMIRNATY) 2023    Fluad Quad 65+ 2023    Fluzone High-Dose 65+yrs 2021    Hepatitis B 2 Dose Vaccine Heplisav-B 2023, 2023, 2023, 2023    Influenza, Unspecified 2021, 2022       No Known Allergies       Current Outpatient Medications:     allopurinol (ZYLOPRIM) 100 MG tablet, Take 1 tablet by mouth Daily. As needed, Disp: , Rfl:     ARIPiprazole (ABILIFY) 2 MG tablet, Take 1 tablet by mouth 2 (Two) Times a Day.,  Disp: 60 tablet, Rfl: 0    atorvastatin (LIPITOR) 40 MG tablet, Take 1 tablet by mouth Daily., Disp: 90 tablet, Rfl: 1    carvedilol (COREG) 25 MG tablet, Take 1 tablet by mouth 2 (Two) Times a Day With Meals., Disp: , Rfl:     cloNIDine (CATAPRES) 0.2 MG tablet, Take 1 tablet by mouth Every 8 (Eight) Hours As Needed for High Blood Pressure., Disp: , Rfl:     clopidogrel (PLAVIX) 75 MG tablet, , Disp: , Rfl:     Insulin Glargine (LANTUS SOLOSTAR) 100 UNIT/ML injection pen, Inject 10 Units under the skin into the appropriate area as directed Every Night., Disp: , Rfl:     Levothyroxine Sodium 175 MCG capsule, Take 175 mcg by mouth Daily., Disp: 30 capsule, Rfl: 0    lidocaine-prilocaine (EMLA) 2.5-2.5 % cream, APPLY SMALL AMOUNT TO ACCESS SITE (AVF) 1 HOUR BEFORE DIALYSIS. COVER WITH OCCLUSIVE DRESSING (SARAN WRAP), Disp: , Rfl:     Lokelma 10 g packet, Take 10 g by mouth Every Other Day. TAKES MON, WED, FRI, SUN, Disp: , Rfl:     Methoxy PEG-Epoetin Beta (MIRCERA IJ), 30 mcg Every 14 (Fourteen) Days. RECEIVES DURING DIALYSIS, Disp: , Rfl:     midodrine (PROAMATINE) 2.5 MG tablet, Take 1 tablet by mouth 3 (Three) Times a Day Before Meals. On dialysis days, Disp: 90 tablet, Rfl: 3    sertraline (ZOLOFT) 100 MG tablet, Take 1 tablet by mouth Every Night., Disp: 90 tablet, Rfl: 2    sevelamer (RENVELA) 800 MG tablet, Take 1 tablet by mouth 2 (Two) Times a Day., Disp: 90 tablet, Rfl: 1    torsemide (DEMADEX) 100 MG tablet, Take 1 tablet by mouth Daily., Disp: 90 tablet, Rfl: 2    Turmeric (QC TUMERIC COMPLEX PO), Take 2 capsules by mouth 2 (Two) Times a Day., Disp: , Rfl:     Continuous Glucose  (Dexcom G7 ) device, Use 1 Units Continuous. (Patient not taking: Reported on 8/14/2024), Disp: 1 each, Rfl: 12    Continuous Glucose Sensor (Dexcom G7 Sensor) misc, Use 1 Units Continuous. (Patient not taking: Reported on 8/14/2024), Disp: 1 each, Rfl: 12     Objective     Vital Signs:   /70 (BP Location:  "Right arm, Patient Position: Sitting, Cuff Size: Adult)   Pulse 72   Temp 98.4 °F (36.9 °C) (Temporal)   Resp 18   Ht 167.6 cm (66\")   Wt 77.1 kg (169 lb 15.6 oz)   SpO2 90% Comment: Room air  BMI 27.43 kg/m²     Physical Exam  Vital Signs Reviewed   WDWN, Alert, NAD.    HEENT:  EOMI.  nares clear  Neck:  Supple, no JVD, no thyromegaly  Chest:  clear to auscultation bilaterally, no wheezes, crackles; no work of breathing noted  CV: RRR, no M/G/R, pulses 2+, equal.  Abd:  Soft, NT, ND, +BS  EXT:  no clubbing, no cyanosis, no edema  Neuro:  A&Ox3, CN grossly intact, no focal deficits.  Skin: No rashes or lesions noted       Result Review :   I have personally reviewed patient's labs and images.              Patient Active Problem List   Diagnosis    Essential hypertension    Bradycardia, sinus    Anemia due to chronic kidney disease    Hypothyroidism    Idiopathic gout    Proteinuria    Psoriasis    Thrombocytopenia    Type 2 diabetes mellitus without complication    CKD (chronic kidney disease), stage IV    Hyperlipidemia    Monoclonal gammopathy of unknown significance (MGUS)    Stage 5 chronic kidney disease    Chronic gout without tophus    Peritoneal dialysis catheter dysfunction    Medicare annual wellness visit, subsequent    Chronic cough    Peritonitis associated with peritoneal dialysis    HCAP (healthcare-associated pneumonia)    Acute on chronic respiratory failure with hypoxia    End stage renal disease    Aspiration pneumonitis    Sepsis    Type 2 diabetes mellitus, with long-term current use of insulin    GERD (gastroesophageal reflux disease)    Immunosuppression due to drug therapy    Bipolar disorder    Chronic respiratory failure with hypoxia    Shortness of breath    Abnormal stress test    ESRD on dialysis    Abnormal chest CT       Impression and Plan    Recurrent bronchitis  Restrictive lung disease  Concern for aspiration  Chronic dyspnea on exertion  Airway clearance impairment  ESRD on " HD  CKD  Heart failure with preserved EF  GASTON on CPAP  Previous tobacco user, quit over 20 years ago  GERD    -Status post bronchoscopy with airway clearance 8/30/2024.  Bicarb allergy negative.    -PFTs 5/13/2024 showed moderately severe restrictive defect, TLC 55% of predicted.  No significant response to bronchodilator.  DLCO severely reduced 31% of predicted.  -HRCT 8/24/2024 showed lower lobe interstitial lung disease with some cylindrical bronchiectasis.  There is also a 10 mm nodule in the left lower lobe that is new compared from 3 months ago.  Will follow-up in 3 months to ensure stability/resolution of this nodule.  -Continue using Breztri inhaler 2 puffs twice a day, instructed patient to rinse mouth after each use  -Continue using DuoNeb treatments as needed  -Continue taking Zyrtec for allergies  -Will order swallow study to assess for aspiration  -Will start pantoprazole 40 mg daily for GERD  -Continue following up with nephrology for management of ESRD on hemodialysis    Smoking status: Reviewed  Vaccination status: Patient is up-to-date with his pneumonia and COVID and RSV vaccinations.  He will get the flu vaccination today.  Medications personally reviewed    Follow Up   No follow-ups on file.  Patient was given instructions and counseling regarding his condition or for health maintenance advice. Please see specific information pulled into the AVS if appropriate.

## 2024-09-13 LAB
FUNGUS WND CULT: NORMAL
FUNGUS WND CULT: NORMAL
MYCOBACTERIUM SPEC CULT: NORMAL
MYCOBACTERIUM SPEC CULT: NORMAL
NIGHT BLUE STAIN TISS: NORMAL

## 2024-09-16 ENCOUNTER — HOSPITAL ENCOUNTER (EMERGENCY)
Facility: HOSPITAL | Age: 79
Discharge: HOME | End: 2024-09-16
Attending: EMERGENCY MEDICINE | Admitting: EMERGENCY MEDICINE
Payer: MEDICARE

## 2024-09-16 ENCOUNTER — APPOINTMENT (OUTPATIENT)
Dept: GENERAL RADIOLOGY | Facility: HOSPITAL | Age: 79
End: 2024-09-16
Payer: MEDICARE

## 2024-09-16 ENCOUNTER — TELEPHONE (OUTPATIENT)
Dept: INTERNAL MEDICINE | Facility: CLINIC | Age: 79
End: 2024-09-16
Payer: MEDICARE

## 2024-09-16 VITALS
WEIGHT: 174.82 LBS | HEART RATE: 85 BPM | BODY MASS INDEX: 28.1 KG/M2 | SYSTOLIC BLOOD PRESSURE: 116 MMHG | DIASTOLIC BLOOD PRESSURE: 56 MMHG | OXYGEN SATURATION: 91 % | TEMPERATURE: 98.1 F | RESPIRATION RATE: 14 BRPM | HEIGHT: 66 IN

## 2024-09-16 DIAGNOSIS — S93.401A SPRAIN OF RIGHT ANKLE, UNSPECIFIED LIGAMENT, INITIAL ENCOUNTER: Primary | ICD-10-CM
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
## 2024-09-16 DIAGNOSIS — R01.1 HEART MURMUR: ICD-10-CM

## 2024-09-16 DIAGNOSIS — S30.0XXA CONTUSION OF SACRUM, INITIAL ENCOUNTER: ICD-10-CM

## 2024-09-16 DIAGNOSIS — S20.212A RIB CONTUSION, LEFT, INITIAL ENCOUNTER: ICD-10-CM

## 2024-09-16 LAB
HOLD SPECIMEN: NORMAL
URATE SERPL-MCNC: 1.6 MG/DL (ref 3.4–7)

## 2024-09-16 PROCEDURE — 84550 ASSAY OF BLOOD/URIC ACID: CPT | Performed by: EMERGENCY MEDICINE

## 2024-09-16 PROCEDURE — 72220 X-RAY EXAM SACRUM TAILBONE: CPT

## 2024-09-16 PROCEDURE — 99283 EMERGENCY DEPT VISIT LOW MDM: CPT

## 2024-09-16 PROCEDURE — 71045 X-RAY EXAM CHEST 1 VIEW: CPT

## 2024-09-16 PROCEDURE — 73610 X-RAY EXAM OF ANKLE: CPT

## 2024-09-26 ENCOUNTER — TELEPHONE (OUTPATIENT)
Dept: GASTROENTEROLOGY | Facility: CLINIC | Age: 79
End: 2024-09-26

## 2024-09-26 ENCOUNTER — OFFICE VISIT (OUTPATIENT)
Dept: GASTROENTEROLOGY | Facility: CLINIC | Age: 79
End: 2024-09-26
Payer: MEDICARE

## 2024-09-26 VITALS
DIASTOLIC BLOOD PRESSURE: 61 MMHG | HEART RATE: 77 BPM | BODY MASS INDEX: 27.97 KG/M2 | SYSTOLIC BLOOD PRESSURE: 166 MMHG | OXYGEN SATURATION: 100 % | HEIGHT: 66 IN | WEIGHT: 174 LBS

## 2024-09-26 DIAGNOSIS — Z86.010 HISTORY OF COLON POLYPS: ICD-10-CM

## 2024-09-26 DIAGNOSIS — Z12.11 ENCOUNTER FOR SCREENING FOR MALIGNANT NEOPLASM OF COLON: ICD-10-CM

## 2024-09-26 DIAGNOSIS — Z80.0 FAMILY HISTORY OF COLON CANCER: ICD-10-CM

## 2024-09-26 DIAGNOSIS — Z86.19 HISTORY OF CLOSTRIDIOIDES DIFFICILE INFECTION: Primary | ICD-10-CM

## 2024-09-26 PROBLEM — Z86.0100 HISTORY OF COLON POLYPS: Status: ACTIVE | Noted: 2024-09-26

## 2024-09-26 RX ORDER — POLYETHYLENE GLYCOL 3350, SODIUM CHLORIDE, SODIUM BICARBONATE, POTASSIUM CHLORIDE 420; 11.2; 5.72; 1.48 G/4L; G/4L; G/4L; G/4L
4000 POWDER, FOR SOLUTION ORAL ONCE
Qty: 4000 ML | Refills: 0 | Status: SHIPPED | OUTPATIENT
Start: 2024-09-26 | End: 2024-09-26

## 2024-09-27 ENCOUNTER — APPOINTMENT (OUTPATIENT)
Dept: GENERAL RADIOLOGY | Facility: HOSPITAL | Age: 79
DRG: 391 | End: 2024-09-27
Payer: MEDICARE

## 2024-09-27 ENCOUNTER — HOSPITAL ENCOUNTER (EMERGENCY)
Facility: HOSPITAL | Age: 79
Discharge: HOME | DRG: 391 | End: 2024-09-27
Attending: EMERGENCY MEDICINE
Payer: MEDICARE

## 2024-09-27 VITALS
DIASTOLIC BLOOD PRESSURE: 125 MMHG | BODY MASS INDEX: 27 KG/M2 | HEIGHT: 66 IN | OXYGEN SATURATION: 93 % | TEMPERATURE: 98.3 F | SYSTOLIC BLOOD PRESSURE: 171 MMHG | HEART RATE: 86 BPM | RESPIRATION RATE: 18 BRPM | WEIGHT: 167.99 LBS

## 2024-09-27 DIAGNOSIS — R05.1 ACUTE COUGH: Primary | ICD-10-CM

## 2024-09-27 LAB
ALBUMIN SERPL-MCNC: 4.2 G/DL (ref 3.5–5.2)
ALBUMIN/GLOB SERPL: 1.2 G/DL
ALP SERPL-CCNC: 95 U/L (ref 39–117)
ALT SERPL W P-5'-P-CCNC: 12 U/L (ref 1–41)
ANION GAP SERPL CALCULATED.3IONS-SCNC: 12.6 MMOL/L (ref 5–15)
AST SERPL-CCNC: 15 U/L (ref 1–40)
BASOPHILS # BLD AUTO: 0.03 10*3/MM3 (ref 0–0.2)
BASOPHILS NFR BLD AUTO: 0.4 % (ref 0–1.5)
BILIRUB SERPL-MCNC: 0.6 MG/DL (ref 0–1.2)
BUN SERPL-MCNC: 48 MG/DL (ref 8–23)
BUN/CREAT SERPL: 9 (ref 7–25)
CALCIUM SPEC-SCNC: 9.7 MG/DL (ref 8.6–10.5)
CHLORIDE SERPL-SCNC: 95 MMOL/L (ref 98–107)
CO2 SERPL-SCNC: 29.4 MMOL/L (ref 22–29)
CREAT SERPL-MCNC: 5.36 MG/DL (ref 0.76–1.27)
DEPRECATED RDW RBC AUTO: 52.2 FL (ref 37–54)
EGFRCR SERPLBLD CKD-EPI 2021: 10.3 ML/MIN/1.73
EOSINOPHIL # BLD AUTO: 0.15 10*3/MM3 (ref 0–0.4)
EOSINOPHIL NFR BLD AUTO: 2 % (ref 0.3–6.2)
ERYTHROCYTE [DISTWIDTH] IN BLOOD BY AUTOMATED COUNT: 15.4 % (ref 12.3–15.4)
FLUAV SUBTYP SPEC NAA+PROBE: NOT DETECTED
FLUBV RNA ISLT QL NAA+PROBE: NOT DETECTED
FUNGUS WND CULT: NORMAL
FUNGUS WND CULT: NORMAL
GLOBULIN UR ELPH-MCNC: 3.4 GM/DL
GLUCOSE SERPL-MCNC: 147 MG/DL (ref 65–99)
HCT VFR BLD AUTO: 33.7 % (ref 37.5–51)
HGB BLD-MCNC: 10.7 G/DL (ref 13–17.7)
HOLD SPECIMEN: NORMAL
HOLD SPECIMEN: NORMAL
IMM GRANULOCYTES # BLD AUTO: 0.04 10*3/MM3 (ref 0–0.05)
IMM GRANULOCYTES NFR BLD AUTO: 0.5 % (ref 0–0.5)
LYMPHOCYTES # BLD AUTO: 1.89 10*3/MM3 (ref 0.7–3.1)
LYMPHOCYTES NFR BLD AUTO: 24.9 % (ref 19.6–45.3)
MAGNESIUM SERPL-MCNC: 1.6 MG/DL (ref 1.6–2.4)
MCH RBC QN AUTO: 30.3 PG (ref 26.6–33)
MCHC RBC AUTO-ENTMCNC: 31.8 G/DL (ref 31.5–35.7)
MCV RBC AUTO: 95.5 FL (ref 79–97)
MONOCYTES # BLD AUTO: 0.78 10*3/MM3 (ref 0.1–0.9)
MONOCYTES NFR BLD AUTO: 10.3 % (ref 5–12)
MYCOBACTERIUM SPEC CULT: NORMAL
MYCOBACTERIUM SPEC CULT: NORMAL
NEUTROPHILS NFR BLD AUTO: 4.71 10*3/MM3 (ref 1.7–7)
NEUTROPHILS NFR BLD AUTO: 61.9 % (ref 42.7–76)
NIGHT BLUE STAIN TISS: NORMAL
NRBC BLD AUTO-RTO: 0 /100 WBC (ref 0–0.2)
PLATELET # BLD AUTO: 111 10*3/MM3 (ref 140–450)
PMV BLD AUTO: 11.3 FL (ref 6–12)
POTASSIUM SERPL-SCNC: 3.8 MMOL/L (ref 3.5–5.2)
PROT SERPL-MCNC: 7.6 G/DL (ref 6–8.5)
QT INTERVAL: 396 MS
QTC INTERVAL: 472 MS
RBC # BLD AUTO: 3.53 10*6/MM3 (ref 4.14–5.8)
RSV RNA NPH QL NAA+NON-PROBE: NOT DETECTED
SARS-COV-2 RNA RESP QL NAA+PROBE: NOT DETECTED
SODIUM SERPL-SCNC: 137 MMOL/L (ref 136–145)
TROPONIN T SERPL HS-MCNC: 73 NG/L
WBC NRBC COR # BLD AUTO: 7.6 10*3/MM3 (ref 3.4–10.8)
WHOLE BLOOD HOLD COAG: NORMAL
WHOLE BLOOD HOLD SPECIMEN: NORMAL

## 2024-09-27 PROCEDURE — 87637 SARSCOV2&INF A&B&RSV AMP PRB: CPT

## 2024-09-27 PROCEDURE — 99284 EMERGENCY DEPT VISIT MOD MDM: CPT

## 2024-09-27 PROCEDURE — 85025 COMPLETE CBC W/AUTO DIFF WBC: CPT

## 2024-09-27 PROCEDURE — 83735 ASSAY OF MAGNESIUM: CPT

## 2024-09-27 PROCEDURE — 71045 X-RAY EXAM CHEST 1 VIEW: CPT

## 2024-09-27 PROCEDURE — 80053 COMPREHEN METABOLIC PANEL: CPT

## 2024-09-27 PROCEDURE — 84484 ASSAY OF TROPONIN QUANT: CPT

## 2024-09-27 PROCEDURE — 93010 ELECTROCARDIOGRAM REPORT: CPT | Performed by: INTERNAL MEDICINE

## 2024-09-27 PROCEDURE — 93005 ELECTROCARDIOGRAM TRACING: CPT

## 2024-09-27 PROCEDURE — 93005 ELECTROCARDIOGRAM TRACING: CPT | Performed by: EMERGENCY MEDICINE

## 2024-09-27 PROCEDURE — 36415 COLL VENOUS BLD VENIPUNCTURE: CPT

## 2024-09-27 RX ORDER — ASPIRIN 81 MG/1
324 TABLET, CHEWABLE ORAL ONCE
Status: COMPLETED | OUTPATIENT
Start: 2024-09-27 | End: 2024-09-27

## 2024-09-27 RX ORDER — SODIUM CHLORIDE 0.9 % (FLUSH) 0.9 %
10 SYRINGE (ML) INJECTION AS NEEDED
Status: DISCONTINUED | OUTPATIENT
Start: 2024-09-27 | End: 2024-09-27 | Stop reason: HOSPADM

## 2024-09-27 RX ADMIN — ASPIRIN 324 MG: 81 TABLET, CHEWABLE ORAL at 17:18

## 2024-09-27 NOTE — ED PROVIDER NOTES
Time: 4:52 PM EDT  Date of encounter:  9/27/2024  Independent Historian/Clinical History and Information was obtained by:   Patient and Family    History is limited by: N/A    Chief Complaint   Patient presents with    Chest Pain         History of Present Illness:  Patient is a 79 y.o. year old male who presents to the emergency department for evaluation of chest pain and shortness of breath ongoing for around 1 hour prior to arrival.  Patient also reports cough and fevers at home.  Patient reports diagnosed with pneumonia around 2 months ago and has not fully recovered from cough since that time.  Denies productive cough.     Patient Care Team  Primary Care Provider: Domingo Bravo MD    Past Medical History:     No Known Allergies  Past Medical History:   Diagnosis Date    Abnormal stress test     Anemia     IRON INFUSIONS WITH DIALYSIS    Anesthesia     WITH HIP REPLACEMENT DAUGHTER BELIEVES HAD SVT 2000    Ankle pain, right     Anxiety and depression     CKD (chronic kidney disease), stage IV     DIALYSIS GRRS-RRPCO-CMPOUMEF SHILEY IN RIGHT CHEST    Diabetes     Gout     High cholesterol     History of peritoneal dialysis     HL (hearing loss)     Hyperkalemia     Hypertension     Hypothyroidism     Insomnia     Night terrors     Psoriasis     Psoriasis     Sleep walking     Thrombocytopenia     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    Vitiligo      Past Surgical History:   Procedure Laterality Date    ANKLE SURGERY Right 11/1990    APPENDECTOMY N/A 1954    ARTERIOVENOUS FISTULA/SHUNT SURGERY Left 07/16/2024    Procedure: Creation of left arm arteriovenous fistula;  Surgeon: Moses Mir MD;  Location: [facility] MAIN OR;  Service: Vascular;  Laterality: Left;    BRONCHOSCOPY N/A 03/01/2024    Procedure: BRONCHOSCOPY WITH BAL AND WASHINGS;  Surgeon: Sandra Espinal MD;  Location: [facility] ENDOSCOPY;  Service: Pulmonary;  Laterality: N/A;  PNEUMONIA    BRONCHOSCOPY N/A 08/30/2024    Procedure: BRONCHOSCOPY WITH  BALS AND WASHINGS;  Surgeon: Sandra Espinal MD;  Location: [facility] ENDOSCOPY;  Service: Pulmonary;  Laterality: N/A;  MUCOUS PLUGGING    CARDIAC CATHETERIZATION N/A 05/29/2024    Procedure: Left Heart Cath with possible coronary angioplasty;  Surgeon: Stephan Nichols MD;  Location: [facility] CATH INVASIVE LOCATION;  Service: Cardiology;  Laterality: N/A;    COLONOSCOPY N/A 06/2022    [facility]    HIP BIPOLAR REPLACEMENT Right 01/2000    INSERTION HEMODIALYSIS CATHETER Left 04/09/2024    Procedure: HEMODIALYSIS CATHETER INSERTION;  Surgeon: Jose Berry MD;  Location: [facility] OR;  Service: General;  Laterality: Left;    INSERTION HEMODIALYSIS CATHETER N/A 04/12/2024    Procedure: Tunneled hemodialysis catheter insertion;  Surgeon: Enrique Vinson MD;  Location: [facility] OR;  Service: Vascular;  Laterality: N/A;    INSERTION PERITONEAL DIALYSIS CATHETER N/A 03/27/2023    Procedure: LAPAROSCOPIC INSERTION PERITONEAL DIALYSIS CATHETER, LAPAROSCOPIC OMENTOPEXY WITH LYSIS OF ADHESIONS;  Surgeon: Jose Berry MD;  Location: [facility] OR;  Service: General;  Laterality: N/A;    INSERTION PERITONEAL DIALYSIS CATHETER Left 07/23/2023    Procedure: REVISION OF PERITONEAL DIALYSIS CATHETER;  Surgeon: Radha Oreilly MD;  Location: [facility] MAIN OR;  Service: General;  Laterality: Left;    REMOVAL PERITONEAL DIALYSIS CATHETER N/A 04/09/2024    Procedure: REMOVAL PERITONEAL DIALYSIS CATHETER;  Surgeon: Jose Berry MD;  Location: [facility] MAIN OR;  Service: General;  Laterality: N/A;    RENAL BIOPSY Left 07/15/2022    UPPER GASTROINTESTINAL ENDOSCOPY      WRIST SURGERY      UNSURE WHICH SIDE DAUGHTER REPORTS HAD SEVERE  CUT FROM WINDOW AND THEY HAD TO DO RECONSTRUCTIVE SURGERY WITH VESSELS AND NERVES     Family History   Problem Relation Age of Onset    Diabetes Mother     Heart disease Father     Colon cancer Sister 77    Cancer Sister 35    Diabetes Sister     Diabetes  Brother     Sleep apnea Paternal Aunt     Heart disease Paternal Uncle     Sleep apnea Daughter     Malvan Hyperthermia Neg Hx        Home Medications:  Prior to Admission medications    Medication Sig Start Date End Date Taking? Authorizing Provider   allopurinol (ZYLOPRIM) 100 MG tablet Take 1 tablet by mouth Daily. As needed 3/11/24   Serena Matute MD   ARIPiprazole (ABILIFY) 2 MG tablet Take 1 tablet by mouth 2 (Two) Times a Day. 9/10/24   Domingo Bravo MD   atorvastatin (LIPITOR) 40 MG tablet Take 1 tablet by mouth Daily. 7/17/24   Domingo Bravo MD   carvedilol (COREG) 25 MG tablet Take 1 tablet by mouth 2 (Two) Times a Day With Meals.    Serena Matute MD   cloNIDine (CATAPRES) 0.2 MG tablet Take 1 tablet by mouth Every 8 (Eight) Hours As Needed for High Blood Pressure. 6/18/24   Serena Matute MD   clopidogrel (PLAVIX) 75 MG tablet  8/14/24   Serena Matute MD   Continuous Glucose  (Dexcom G7 ) device Use 1 Units Continuous.  Patient not taking: Reported on 8/14/2024 7/17/24   Domingo Bravo MD   Continuous Glucose Sensor (Dexcom G7 Sensor) misc Use 1 Units Continuous.  Patient not taking: Reported on 8/14/2024 7/17/24   Domingo Bravo MD   Insulin Glargine (LANTUS SOLOSTAR) 100 UNIT/ML injection pen Inject 10 Units under the skin into the appropriate area as directed Every Night. 4/14/24   Cristian Santacruz MD   Levothyroxine Sodium 175 MCG capsule Take 175 mcg by mouth Daily. 9/10/24   Domingo Bravo MD   lidocaine-prilocaine (EMLA) 2.5-2.5 % cream APPLY SMALL AMOUNT TO ACCESS SITE (AVF) 1 HOUR BEFORE DIALYSIS. COVER WITH OCCLUSIVE DRESSING (SARAN WRAP) 8/30/24   Serena Matute MD   Lokelma 10 g packet Take 10 g by mouth Every Other Day. TAKES MON, WED, FRI, SUN 6/11/24   Serena Matute MD   Methoxy PEG-Epoetin Beta (MIRCERA IJ) 30 mcg Every 14 (Fourteen) Days. RECEIVES DURING DIALYSIS 4/18/24 4/17/25  Serena Matute  MD   midodrine (PROAMATINE) 2.5 MG tablet Take 1 tablet by mouth 3 (Three) Times a Day Before Meals. On dialysis days 24   Aylin Nicole APRN   pantoprazole (PROTONIX) 40 MG EC tablet Take 1 tablet by mouth Daily. 24   Sandra Espinal MD   polyethylene glycol-electrolytes (Nulytely with Flavor Packs) 420 g solution Take 4,000 mL by mouth 1 (One) Time for 1 dose. 24  Flor Kamara APRN   sertraline (ZOLOFT) 100 MG tablet Take 1 tablet by mouth Every Night. 24   Domingo Bravo MD   sevelamer (RENVELA) 800 MG tablet Take 1 tablet by mouth 2 (Two) Times a Day. 24   Domingo Bravo MD   torsemide (DEMADEX) 100 MG tablet Take 1 tablet by mouth Daily. 24   Domingo Bravo MD   Turmeric (QC TUMERIC COMPLEX PO) Take 2 capsules by mouth 2 (Two) Times a Day.    Provider, MD Serena        Social History:   Social History     Tobacco Use    Smoking status: Former     Current packs/day: 0.00     Average packs/day: 1 pack/day for 10.0 years (10.0 ttl pk-yrs)     Types: Cigarettes     Start date:      Quit date: 2000     Years since quittin.7     Passive exposure: Past    Smokeless tobacco: Never    Tobacco comments:     quit 25 years ago, chews nicotine gum in past   Vaping Use    Vaping status: Never Used   Substance Use Topics    Alcohol use: Yes     Comment: 1 DRINK/DAY-rarely    Drug use: Never         Review of Systems:  Review of Systems   Constitutional:  Negative for chills and fever.   HENT:  Negative for congestion, rhinorrhea and sore throat.    Eyes:  Negative for pain and visual disturbance.   Respiratory:  Positive for cough and shortness of breath. Negative for apnea and chest tightness.    Cardiovascular:  Negative for chest pain and palpitations.   Gastrointestinal:  Negative for abdominal pain, diarrhea, nausea and vomiting.   Genitourinary:  Negative for difficulty urinating and dysuria.   Musculoskeletal:  Negative for joint swelling and  "myalgias.   Skin:  Negative for color change.   Neurological:  Negative for seizures and headaches.   Psychiatric/Behavioral: Negative.     All other systems reviewed and are negative.       Physical Exam:  BP (!) 171/125 (BP Location: Right arm, Patient Position: Sitting)   Pulse 86   Temp 98.3 °F (36.8 °C) (Oral)   Resp 18   Ht 167.6 cm (66\")   Wt 76.2 kg (167 lb 15.9 oz)   SpO2 93%   BMI 27.11 kg/m²         Physical Exam  Vitals and nursing note reviewed.   Constitutional:       General: He is not in acute distress.     Appearance: Normal appearance. He is not toxic-appearing.   HENT:      Head: Normocephalic and atraumatic.      Jaw: There is normal jaw occlusion.   Eyes:      General: Lids are normal.      Extraocular Movements: Extraocular movements intact.      Conjunctiva/sclera: Conjunctivae normal.      Pupils: Pupils are equal, round, and reactive to light.   Cardiovascular:      Rate and Rhythm: Normal rate and regular rhythm.      Pulses: Normal pulses.      Heart sounds: Normal heart sounds.   Pulmonary:      Effort: Pulmonary effort is normal. No respiratory distress.      Breath sounds: Normal breath sounds. No wheezing or rhonchi.   Abdominal:      General: Abdomen is flat. There is no distension.      Palpations: Abdomen is soft.      Tenderness: There is no abdominal tenderness. There is no guarding or rebound.   Musculoskeletal:         General: Normal range of motion.      Cervical back: Normal range of motion and neck supple.      Right lower leg: No edema.      Left lower leg: No edema.   Skin:     General: Skin is warm and dry.      Coloration: Skin is not cyanotic.   Neurological:      Mental Status: He is alert and oriented to person, place, and time. Mental status is at baseline.   Psychiatric:         Attention and Perception: Attention and perception normal.         Mood and Affect: Mood normal.                      Procedures:  Procedures      Medical Decision " Making:      Comorbidities that affect care:    ESRD    External Notes reviewed:    Previous Clinic Note: GI office visit for colon cancer screening      The following orders were placed and all results were independently analyzed by me:  Orders Placed This Encounter   Procedures    COVID-19, FLU A/B, RSV PCR 1 HR TAT - Swab, Nasopharynx    XR Chest 1 View    [place] Draw    Comprehensive Metabolic Panel    High Sensitivity Troponin T    Magnesium    CBC Auto Differential    Continuous Pulse Oximetry    Vital Signs    ECG 12 Lead Chest Pain    CBC & Differential    Green Top (Gel)    Lavender Top    Gold Top - SST    Light Blue Top       Medications Given in the Emergency Department:  Medications   aspirin chewable tablet 324 mg (324 mg Oral Given 9/27/24 1718)        ED Course:    The patient was initially evaluated in the triage area where orders were placed. The patient was later dispositioned by Fady Rivas MD.      The patient was advised to stay for completion of workup which includes but is not limited to communication of labs and radiological results, reassessment and plan. The patient was advised that leaving prior to disposition by a provider could result in critical findings that are not communicated to the patient.     ED Course as of 09/27/24 2229   Fri Sep 27, 2024   1653   --- PROVIDER IN TRIAGE NOTE ---    The patient was evaluated by Liliam harris in triage. Orders were placed and the patient is currently awaiting disposition.      [CE]      ED Course User Index  [CE] Liliam Delgado, BECKY       Labs:    Lab Results (last 24 hours)       Procedure Component Value Units Date/Time    CBC & Differential [[id number]]  (Abnormal) Collected: 09/27/24 1655    Specimen: Blood from Arm, Right Updated: 09/27/24 1711    Narrative:      The following orders were created for panel order CBC & Differential.  Procedure                               Abnormality         Status                     ---------                                -----------         ------                     CBC Auto Differential[[id number]]        Abnormal            Final result                 Please view results for these tests on the individual orders.    Comprehensive Metabolic Panel [[id number]]  (Abnormal) Collected: 09/27/24 1655    Specimen: Blood from Arm, Right Updated: 09/27/24 1728     Glucose 147 mg/dL      BUN 48 mg/dL      Creatinine 5.36 mg/dL      Sodium 137 mmol/L      Potassium 3.8 mmol/L      Comment: Slight hemolysis detected by analyzer. Result may be falsely elevated.        Chloride 95 mmol/L      CO2 29.4 mmol/L      Calcium 9.7 mg/dL      Total Protein 7.6 g/dL      Albumin 4.2 g/dL      ALT (SGPT) 12 U/L      AST (SGOT) 15 U/L      Alkaline Phosphatase 95 U/L      Total Bilirubin 0.6 mg/dL      Globulin 3.4 gm/dL      A/G Ratio 1.2 g/dL      BUN/Creatinine Ratio 9.0     Anion Gap 12.6 mmol/L      eGFR 10.3 mL/min/1.73      Comment: <15 Indicative of kidney failure       Narrative:      GFR Normal >60  Chronic Kidney Disease <60  Kidney Failure <15    The GFR formula is only valid for adults with stable renal function between ages 18 and 70.    High Sensitivity Troponin T [[id number]]  (Abnormal) Collected: 09/27/24 1655    Specimen: Blood from Arm, Right Updated: 09/27/24 1743     HS Troponin T 73 ng/L     Narrative:      High Sensitive Troponin T Reference Range:  <14.0 ng/L- Negative Female for AMI  <22.0 ng/L- Negative Male for AMI  >=14 - Abnormal Female indicating possible myocardial injury.  >=22 - Abnormal Male indicating possible myocardial injury.   Clinicians would have to utilize clinical acumen, EKG, Troponin, and serial changes to determine if it is an Acute Myocardial Infarction or myocardial injury due to an underlying chronic condition.         Magnesium [[id number]]  (Normal) Collected: 09/27/24 1655    Specimen: Blood from Arm, Right Updated: 09/27/24 1728     Magnesium 1.6 mg/dL     CBC Auto Differential  [[id number]]  (Abnormal) Collected: 09/27/24 1655    Specimen: Blood from Arm, Right Updated: 09/27/24 1711     WBC 7.60 10*3/mm3      RBC 3.53 10*6/mm3      Hemoglobin 10.7 g/dL      Hematocrit 33.7 %      MCV 95.5 fL      MCH 30.3 pg      MCHC 31.8 g/dL      RDW 15.4 %      RDW-SD 52.2 fl      MPV 11.3 fL      Platelets 111 10*3/mm3      Neutrophil % 61.9 %      Lymphocyte % 24.9 %      Monocyte % 10.3 %      Eosinophil % 2.0 %      Basophil % 0.4 %      Immature Grans % 0.5 %      Neutrophils, Absolute 4.71 10*3/mm3      Lymphocytes, Absolute 1.89 10*3/mm3      Monocytes, Absolute 0.78 10*3/mm3      Eosinophils, Absolute 0.15 10*3/mm3      Basophils, Absolute 0.03 10*3/mm3      Immature Grans, Absolute 0.04 10*3/mm3      nRBC 0.0 /100 WBC     COVID-19, FLU A/B, RSV PCR 1 HR TAT - Swab, Nasopharynx [[id number]]  (Normal) Collected: 09/27/24 1715    Specimen: Swab from Nasopharynx Updated: 09/27/24 1757     COVID19 Not Detected     Influenza A PCR Not Detected     Influenza B PCR Not Detected     RSV, PCR Not Detected    Narrative:      Fact sheet for providers: https://www.fda.gov/media/924677/download    Fact sheet for patients: https://www.fda.gov/media/572106/download    Test performed by PCR.             Imaging:    XR Chest 1 View    Result Date: 9/27/2024  XR CHEST 1 VW Date of Exam: 9/27/2024 5:20 PM EDT Indication: Chest pain protocol Chest Pain Protocol Comparison: Chest radiograph 9/16/2024, chest CT 8/23/2024. Findings: Unchanged position of right chest port tip. Mildly enlarged cardiac silhouette, unchanged. Thoracic aortic calcifications. Unchanged right hemidiaphragm elevation. No focal consolidation. No pleural effusion or pneumothorax. Osseous structures are unchanged.     Impression: No acute cardiopulmonary findings. Electronically Signed: Claus Sánchez MD  9/27/2024 5:44 PM EDT  Workstation ID: [id number]       Differential Diagnosis and Discussion:      Chest Pain:  Based on the patient's signs and  symptoms, I considered aortic dissection, myocardial infaction, pulmonary embolism, cardiac tamponade, pericarditis, pneumothorax, musculoskeletal chest pain and other differential diagnosis as an etiology of the patient's chest pain.     All labs were reviewed and interpreted by me.  All X-rays impressions were independently interpreted by me.  EKG was interpreted by me.    MDM  Number of Diagnoses or Management Options  Acute cough  Diagnosis management comments: In summary this is a 79-year-old male patient with ESRD on hemodialysis who presents to the emergency department for evaluation of chest discomfort.  CBC independently reviewed and interpreted by me and shows no critical abnormalities.  CMP independently reviewed and interpreted by me and shows no critical abnormalities.  High-sensitivity troponin independent reviewed interpreted by me is unremarkable for acute pathology, is elevated but only due to his CKD.  COVID-19, influenza, RSV testing all independently reviewed interpreted by me is unremarkable for acute pathology.  Chest x-ray reviewed by me is unremarkable for acute pathology.  Patient is otherwise well-appearing in no acute distress.  He does report discomfort of the ribs in the muscles of the chest with movement, I have offered him muscle relaxer prescription but he does have a Flexeril prescription at home that he will start taking.  Very strict return to ER and follow-up instructions have been provided to the patient.                       Patient Care Considerations:    CONSULT: I considered consulting cardiology, however negative cardiac workup      Consultants/Shared Management Plan:    None    Social Determinants of Health:    Patient is independent, reliable, and has access to care.       Disposition and Care Coordination:    Discharged: I considered escalation of care by admitting this patient to the hospital, however negative cardiac workup    I have explained the patient´s condition,  diagnoses and treatment plan based on the information available to me at this time. I have answered questions and addressed any concerns. The patient has a good  understanding of the patient´s diagnosis, condition, and treatment plan as can be expected at this point. The vital signs have been stable. The patient´s condition is stable and appropriate for discharge from the emergency department.      The patient will pursue further outpatient evaluation with the primary care physician or other designated or consulting physician as outlined in the discharge instructions. They are agreeable to this plan of care and follow-up instructions have been explained in detail. The patient has received these instructions in written format and has expressed an understanding of the discharge instructions. The patient is aware that any significant change in condition or worsening of symptoms should prompt an immediate return to this or the closest emergency department or call to 911.  I have explained discharge medications and the need for follow up with the patient/caretakers. This was also printed in the discharge instructions. Patient was discharged with the following medications and follow up:      Medication List        Stop      polyethylene glycol-electrolytes 420 g solution  Commonly known as: Nulytely with Flavor Packs           Domingo Bravo MD  [address] [address]  [phone]    In 1 week         Final diagnoses:   Acute cough        ED Disposition       ED Disposition   Discharge    Condition   Stable    Comment   --               This medical record created using voice recognition software.             Fady Rivas MD  09/27/24 5056

## 2024-09-27 NOTE — ED TRIAGE NOTES
Pt arrives with complaints of SOA and chest pain across chest that worsens with deep breath that began approx 1 hour ago. Pt is a dialsysis pt who recieved dialysis earlier today.

## 2024-09-28 ENCOUNTER — HOSPITAL ENCOUNTER (INPATIENT)
Facility: HOSPITAL | Age: 79
LOS: 4 days | Discharge: HOME | DRG: 391 | End: 2024-10-03
Attending: EMERGENCY MEDICINE | Admitting: STUDENT IN AN ORGANIZED HEALTH CARE EDUCATION/TRAINING PROGRAM
Payer: MEDICARE

## 2024-09-28 ENCOUNTER — APPOINTMENT (OUTPATIENT)
Dept: GENERAL RADIOLOGY | Facility: HOSPITAL | Age: 79
DRG: 391 | End: 2024-09-28
Payer: MEDICARE

## 2024-09-28 ENCOUNTER — APPOINTMENT (OUTPATIENT)
Dept: CT IMAGING | Facility: HOSPITAL | Age: 79
DRG: 391 | End: 2024-09-28
Payer: MEDICARE

## 2024-09-28 ENCOUNTER — APPOINTMENT (OUTPATIENT)
Dept: CARDIOLOGY | Facility: HOSPITAL | Age: 79
DRG: 391 | End: 2024-09-28
Payer: MEDICARE

## 2024-09-28 DIAGNOSIS — N18.6 ESRD ON DIALYSIS: Primary | ICD-10-CM

## 2024-09-28 DIAGNOSIS — R52 INTRACTABLE PAIN: ICD-10-CM

## 2024-09-28 DIAGNOSIS — R26.2 DIFFICULTY WALKING: ICD-10-CM

## 2024-09-28 DIAGNOSIS — Z99.2 ESRD ON DIALYSIS: Primary | ICD-10-CM

## 2024-09-28 LAB
ALBUMIN SERPL-MCNC: 4.1 G/DL (ref 3.5–5.2)
ALBUMIN/GLOB SERPL: 1.1 G/DL
ALP SERPL-CCNC: 79 U/L (ref 39–117)
ALT SERPL W P-5'-P-CCNC: 12 U/L (ref 1–41)
ANION GAP SERPL CALCULATED.3IONS-SCNC: 12.7 MMOL/L (ref 5–15)
ANION GAP SERPL CALCULATED.3IONS-SCNC: 14 MMOL/L (ref 5–15)
AST SERPL-CCNC: 14 U/L (ref 1–40)
B PARAPERT DNA SPEC QL NAA+PROBE: NOT DETECTED
B PERT DNA SPEC QL NAA+PROBE: NOT DETECTED
BASOPHILS # BLD AUTO: 0.01 10*3/MM3 (ref 0–0.2)
BASOPHILS # BLD AUTO: 0.02 10*3/MM3 (ref 0–0.2)
BASOPHILS NFR BLD AUTO: 0.1 % (ref 0–1.5)
BASOPHILS NFR BLD AUTO: 0.3 % (ref 0–1.5)
BILIRUB SERPL-MCNC: 0.7 MG/DL (ref 0–1.2)
BUN SERPL-MCNC: 59 MG/DL (ref 8–23)
BUN SERPL-MCNC: 59 MG/DL (ref 8–23)
BUN/CREAT SERPL: 9.2 (ref 7–25)
BUN/CREAT SERPL: 9.9 (ref 7–25)
C PNEUM DNA NPH QL NAA+NON-PROBE: NOT DETECTED
CALCIUM SPEC-SCNC: 9.6 MG/DL (ref 8.6–10.5)
CALCIUM SPEC-SCNC: 9.8 MG/DL (ref 8.6–10.5)
CHLORIDE SERPL-SCNC: 94 MMOL/L (ref 98–107)
CHLORIDE SERPL-SCNC: 94 MMOL/L (ref 98–107)
CK SERPL-CCNC: 82 U/L (ref 20–200)
CO2 SERPL-SCNC: 26.3 MMOL/L (ref 22–29)
CO2 SERPL-SCNC: 28 MMOL/L (ref 22–29)
CREAT SERPL-MCNC: 5.95 MG/DL (ref 0.76–1.27)
CREAT SERPL-MCNC: 6.43 MG/DL (ref 0.76–1.27)
CRP SERPL-MCNC: 4.44 MG/DL (ref 0–0.5)
D-LACTATE SERPL-SCNC: 1.6 MMOL/L (ref 0.5–2)
DEPRECATED RDW RBC AUTO: 52.6 FL (ref 37–54)
DEPRECATED RDW RBC AUTO: 52.8 FL (ref 37–54)
EGFRCR SERPLBLD CKD-EPI 2021: 8.3 ML/MIN/1.73
EGFRCR SERPLBLD CKD-EPI 2021: 9.1 ML/MIN/1.73
EOSINOPHIL # BLD AUTO: 0.02 10*3/MM3 (ref 0–0.4)
EOSINOPHIL # BLD AUTO: 0.07 10*3/MM3 (ref 0–0.4)
EOSINOPHIL NFR BLD AUTO: 0.3 % (ref 0.3–6.2)
EOSINOPHIL NFR BLD AUTO: 0.9 % (ref 0.3–6.2)
ERYTHROCYTE [DISTWIDTH] IN BLOOD BY AUTOMATED COUNT: 15.2 % (ref 12.3–15.4)
ERYTHROCYTE [DISTWIDTH] IN BLOOD BY AUTOMATED COUNT: 15.3 % (ref 12.3–15.4)
ERYTHROCYTE [SEDIMENTATION RATE] IN BLOOD: 75 MM/HR (ref 0–20)
FLUAV SUBTYP SPEC NAA+PROBE: NOT DETECTED
FLUBV RNA ISLT QL NAA+PROBE: NOT DETECTED
GEN 5 2HR TROPONIN T REFLEX: 75 NG/L
GLOBULIN UR ELPH-MCNC: 3.7 GM/DL
GLUCOSE BLDC GLUCOMTR-MCNC: 122 MG/DL (ref 70–99)
GLUCOSE BLDC GLUCOMTR-MCNC: 130 MG/DL (ref 70–99)
GLUCOSE BLDC GLUCOMTR-MCNC: 139 MG/DL (ref 70–99)
GLUCOSE SERPL-MCNC: 140 MG/DL (ref 65–99)
GLUCOSE SERPL-MCNC: 170 MG/DL (ref 65–99)
HADV DNA SPEC NAA+PROBE: NOT DETECTED
HCOV 229E RNA SPEC QL NAA+PROBE: NOT DETECTED
HCOV HKU1 RNA SPEC QL NAA+PROBE: NOT DETECTED
HCOV NL63 RNA SPEC QL NAA+PROBE: NOT DETECTED
HCOV OC43 RNA SPEC QL NAA+PROBE: NOT DETECTED
HCT VFR BLD AUTO: 34 % (ref 37.5–51)
HCT VFR BLD AUTO: 34.6 % (ref 37.5–51)
HGB BLD-MCNC: 10.7 G/DL (ref 13–17.7)
HGB BLD-MCNC: 10.8 G/DL (ref 13–17.7)
HMPV RNA NPH QL NAA+NON-PROBE: NOT DETECTED
HOLD SPECIMEN: NORMAL
HOLD SPECIMEN: NORMAL
HPIV1 RNA ISLT QL NAA+PROBE: NOT DETECTED
HPIV2 RNA SPEC QL NAA+PROBE: NOT DETECTED
HPIV3 RNA NPH QL NAA+PROBE: NOT DETECTED
HPIV4 P GENE NPH QL NAA+PROBE: NOT DETECTED
IMM GRANULOCYTES # BLD AUTO: 0.05 10*3/MM3 (ref 0–0.05)
IMM GRANULOCYTES # BLD AUTO: 0.07 10*3/MM3 (ref 0–0.05)
IMM GRANULOCYTES NFR BLD AUTO: 0.6 % (ref 0–0.5)
IMM GRANULOCYTES NFR BLD AUTO: 0.9 % (ref 0–0.5)
LIPASE SERPL-CCNC: 34 U/L (ref 13–60)
LYMPHOCYTES # BLD AUTO: 1.06 10*3/MM3 (ref 0.7–3.1)
LYMPHOCYTES # BLD AUTO: 1.28 10*3/MM3 (ref 0.7–3.1)
LYMPHOCYTES NFR BLD AUTO: 13.3 % (ref 19.6–45.3)
LYMPHOCYTES NFR BLD AUTO: 16.7 % (ref 19.6–45.3)
M PNEUMO IGG SER IA-ACNC: NOT DETECTED
MAGNESIUM SERPL-MCNC: 1.5 MG/DL (ref 1.6–2.4)
MCH RBC QN AUTO: 29.8 PG (ref 26.6–33)
MCH RBC QN AUTO: 30.1 PG (ref 26.6–33)
MCHC RBC AUTO-ENTMCNC: 31.2 G/DL (ref 31.5–35.7)
MCHC RBC AUTO-ENTMCNC: 31.5 G/DL (ref 31.5–35.7)
MCV RBC AUTO: 95.5 FL (ref 79–97)
MCV RBC AUTO: 95.6 FL (ref 79–97)
MONOCYTES # BLD AUTO: 0.78 10*3/MM3 (ref 0.1–0.9)
MONOCYTES # BLD AUTO: 0.93 10*3/MM3 (ref 0.1–0.9)
MONOCYTES NFR BLD AUTO: 12.1 % (ref 5–12)
MONOCYTES NFR BLD AUTO: 9.8 % (ref 5–12)
NEUTROPHILS NFR BLD AUTO: 5.3 10*3/MM3 (ref 1.7–7)
NEUTROPHILS NFR BLD AUTO: 6.03 10*3/MM3 (ref 1.7–7)
NEUTROPHILS NFR BLD AUTO: 69.1 % (ref 42.7–76)
NEUTROPHILS NFR BLD AUTO: 75.9 % (ref 42.7–76)
NRBC BLD AUTO-RTO: 0 /100 WBC (ref 0–0.2)
NRBC BLD AUTO-RTO: 0 /100 WBC (ref 0–0.2)
NT-PROBNP SERPL-MCNC: 3555 PG/ML (ref 0–1800)
NT-PROBNP SERPL-MCNC: 3698 PG/ML (ref 0–1800)
PLATELET # BLD AUTO: 103 10*3/MM3 (ref 140–450)
PLATELET # BLD AUTO: 113 10*3/MM3 (ref 140–450)
PMV BLD AUTO: 11.1 FL (ref 6–12)
PMV BLD AUTO: 11.4 FL (ref 6–12)
POTASSIUM SERPL-SCNC: 4.1 MMOL/L (ref 3.5–5.2)
POTASSIUM SERPL-SCNC: 4.2 MMOL/L (ref 3.5–5.2)
PROT SERPL-MCNC: 7.8 G/DL (ref 6–8.5)
QT INTERVAL: 402 MS
QT INTERVAL: 406 MS
QT INTERVAL: 411 MS
QTC INTERVAL: 463 MS
QTC INTERVAL: 476 MS
QTC INTERVAL: 488 MS
RBC # BLD AUTO: 3.56 10*6/MM3 (ref 4.14–5.8)
RBC # BLD AUTO: 3.62 10*6/MM3 (ref 4.14–5.8)
RHINOVIRUS RNA SPEC NAA+PROBE: NOT DETECTED
RSV RNA NPH QL NAA+NON-PROBE: NOT DETECTED
SARS-COV-2 RNA RESP QL NAA+PROBE: NOT DETECTED
SODIUM SERPL-SCNC: 133 MMOL/L (ref 136–145)
SODIUM SERPL-SCNC: 136 MMOL/L (ref 136–145)
TROPONIN T DELTA: 1 NG/L
TROPONIN T SERPL HS-MCNC: 74 NG/L
WBC NRBC COR # BLD AUTO: 7.67 10*3/MM3 (ref 3.4–10.8)
WBC NRBC COR # BLD AUTO: 7.95 10*3/MM3 (ref 3.4–10.8)
WHOLE BLOOD HOLD COAG: NORMAL
WHOLE BLOOD HOLD SPECIMEN: NORMAL

## 2024-09-28 PROCEDURE — 25010000002 HYDROMORPHONE 1 MG/ML SOLUTION: Performed by: EMERGENCY MEDICINE

## 2024-09-28 PROCEDURE — 94799 UNLISTED PULMONARY SVC/PX: CPT

## 2024-09-28 PROCEDURE — 83880 ASSAY OF NATRIURETIC PEPTIDE: CPT | Performed by: STUDENT IN AN ORGANIZED HEALTH CARE EDUCATION/TRAINING PROGRAM

## 2024-09-28 PROCEDURE — 86140 C-REACTIVE PROTEIN: CPT | Performed by: STUDENT IN AN ORGANIZED HEALTH CARE EDUCATION/TRAINING PROGRAM

## 2024-09-28 PROCEDURE — 93005 ELECTROCARDIOGRAM TRACING: CPT | Performed by: STUDENT IN AN ORGANIZED HEALTH CARE EDUCATION/TRAINING PROGRAM

## 2024-09-28 PROCEDURE — 99232 SBSQ HOSP IP/OBS MODERATE 35: CPT | Performed by: STUDENT IN AN ORGANIZED HEALTH CARE EDUCATION/TRAINING PROGRAM

## 2024-09-28 PROCEDURE — 82948 REAGENT STRIP/BLOOD GLUCOSE: CPT

## 2024-09-28 PROCEDURE — 71250 CT THORAX DX C-: CPT

## 2024-09-28 PROCEDURE — 80053 COMPREHEN METABOLIC PANEL: CPT | Performed by: EMERGENCY MEDICINE

## 2024-09-28 PROCEDURE — 25010000002 MAGNESIUM SULFATE 2 GM/50ML SOLUTION: Performed by: STUDENT IN AN ORGANIZED HEALTH CARE EDUCATION/TRAINING PROGRAM

## 2024-09-28 PROCEDURE — 93010 ELECTROCARDIOGRAM REPORT: CPT | Performed by: INTERNAL MEDICINE

## 2024-09-28 PROCEDURE — 93005 ELECTROCARDIOGRAM TRACING: CPT | Performed by: EMERGENCY MEDICINE

## 2024-09-28 PROCEDURE — 83880 ASSAY OF NATRIURETIC PEPTIDE: CPT | Performed by: EMERGENCY MEDICINE

## 2024-09-28 PROCEDURE — 85025 COMPLETE CBC W/AUTO DIFF WBC: CPT | Performed by: STUDENT IN AN ORGANIZED HEALTH CARE EDUCATION/TRAINING PROGRAM

## 2024-09-28 PROCEDURE — 74176 CT ABD & PELVIS W/O CONTRAST: CPT

## 2024-09-28 PROCEDURE — 25010000002 HYDROMORPHONE 1 MG/ML SOLUTION: Performed by: STUDENT IN AN ORGANIZED HEALTH CARE EDUCATION/TRAINING PROGRAM

## 2024-09-28 PROCEDURE — 85025 COMPLETE CBC W/AUTO DIFF WBC: CPT | Performed by: EMERGENCY MEDICINE

## 2024-09-28 PROCEDURE — 71045 X-RAY EXAM CHEST 1 VIEW: CPT

## 2024-09-28 PROCEDURE — 94761 N-INVAS EAR/PLS OXIMETRY MLT: CPT

## 2024-09-28 PROCEDURE — 97161 PT EVAL LOW COMPLEX 20 MIN: CPT

## 2024-09-28 PROCEDURE — 83735 ASSAY OF MAGNESIUM: CPT | Performed by: EMERGENCY MEDICINE

## 2024-09-28 PROCEDURE — G0378 HOSPITAL OBSERVATION PER HR: HCPCS

## 2024-09-28 PROCEDURE — 0202U NFCT DS 22 TRGT SARS-COV-2: CPT | Performed by: EMERGENCY MEDICINE

## 2024-09-28 PROCEDURE — 82550 ASSAY OF CK (CPK): CPT | Performed by: EMERGENCY MEDICINE

## 2024-09-28 PROCEDURE — 82948 REAGENT STRIP/BLOOD GLUCOSE: CPT | Performed by: STUDENT IN AN ORGANIZED HEALTH CARE EDUCATION/TRAINING PROGRAM

## 2024-09-28 PROCEDURE — 84484 ASSAY OF TROPONIN QUANT: CPT | Performed by: EMERGENCY MEDICINE

## 2024-09-28 PROCEDURE — 93306 TTE W/DOPPLER COMPLETE: CPT

## 2024-09-28 PROCEDURE — 83690 ASSAY OF LIPASE: CPT | Performed by: EMERGENCY MEDICINE

## 2024-09-28 PROCEDURE — 85652 RBC SED RATE AUTOMATED: CPT | Performed by: STUDENT IN AN ORGANIZED HEALTH CARE EDUCATION/TRAINING PROGRAM

## 2024-09-28 PROCEDURE — 83605 ASSAY OF LACTIC ACID: CPT | Performed by: STUDENT IN AN ORGANIZED HEALTH CARE EDUCATION/TRAINING PROGRAM

## 2024-09-28 PROCEDURE — 93306 TTE W/DOPPLER COMPLETE: CPT | Performed by: INTERNAL MEDICINE

## 2024-09-28 PROCEDURE — 25010000002 ONDANSETRON PER 1 MG: Performed by: EMERGENCY MEDICINE

## 2024-09-28 PROCEDURE — 99285 EMERGENCY DEPT VISIT HI MDM: CPT

## 2024-09-28 RX ORDER — TROLAMINE SALICYLATE 10 G/100G
1 CREAM TOPICAL AS NEEDED
Status: DISCONTINUED | OUTPATIENT
Start: 2024-09-28 | End: 2024-10-03 | Stop reason: HOSPADM

## 2024-09-28 RX ORDER — SERTRALINE HYDROCHLORIDE 100 MG/1
100 TABLET, FILM COATED ORAL NIGHTLY
Status: DISCONTINUED | OUTPATIENT
Start: 2024-09-28 | End: 2024-10-03 | Stop reason: HOSPADM

## 2024-09-28 RX ORDER — ACETAMINOPHEN 650 MG/1
650 SUPPOSITORY RECTAL EVERY 4 HOURS PRN
Status: DISCONTINUED | OUTPATIENT
Start: 2024-09-28 | End: 2024-10-03 | Stop reason: HOSPADM

## 2024-09-28 RX ORDER — SEVELAMER CARBONATE 800 MG/1
800 TABLET, FILM COATED ORAL 2 TIMES DAILY
Status: DISCONTINUED | OUTPATIENT
Start: 2024-09-28 | End: 2024-09-29

## 2024-09-28 RX ORDER — SODIUM CHLORIDE 0.9 % (FLUSH) 0.9 %
10 SYRINGE (ML) INJECTION EVERY 12 HOURS SCHEDULED
Status: DISCONTINUED | OUTPATIENT
Start: 2024-09-28 | End: 2024-10-03 | Stop reason: HOSPADM

## 2024-09-28 RX ORDER — ACETAMINOPHEN 160 MG/5ML
650 SOLUTION ORAL EVERY 4 HOURS PRN
Status: DISCONTINUED | OUTPATIENT
Start: 2024-09-28 | End: 2024-10-03 | Stop reason: HOSPADM

## 2024-09-28 RX ORDER — TORSEMIDE 20 MG/1
100 TABLET ORAL DAILY
Status: DISCONTINUED | OUTPATIENT
Start: 2024-09-28 | End: 2024-10-03 | Stop reason: HOSPADM

## 2024-09-28 RX ORDER — ARIPIPRAZOLE 2 MG/1
2 TABLET ORAL 2 TIMES DAILY
Status: DISCONTINUED | OUTPATIENT
Start: 2024-09-28 | End: 2024-10-03 | Stop reason: HOSPADM

## 2024-09-28 RX ORDER — ONDANSETRON 2 MG/ML
4 INJECTION INTRAMUSCULAR; INTRAVENOUS EVERY 6 HOURS PRN
Status: DISCONTINUED | OUTPATIENT
Start: 2024-09-28 | End: 2024-09-28

## 2024-09-28 RX ORDER — INSULIN LISPRO 100 [IU]/ML
2-9 INJECTION, SOLUTION INTRAVENOUS; SUBCUTANEOUS
Status: DISCONTINUED | OUTPATIENT
Start: 2024-09-28 | End: 2024-10-03 | Stop reason: HOSPADM

## 2024-09-28 RX ORDER — MAGNESIUM SULFATE HEPTAHYDRATE 40 MG/ML
2 INJECTION, SOLUTION INTRAVENOUS ONCE
Status: COMPLETED | OUTPATIENT
Start: 2024-09-28 | End: 2024-09-28

## 2024-09-28 RX ORDER — CLONIDINE HYDROCHLORIDE 0.1 MG/1
0.2 TABLET ORAL EVERY 8 HOURS PRN
Status: DISCONTINUED | OUTPATIENT
Start: 2024-09-28 | End: 2024-10-03 | Stop reason: HOSPADM

## 2024-09-28 RX ORDER — ASPIRIN 81 MG/1
324 TABLET, CHEWABLE ORAL ONCE
Status: DISCONTINUED | OUTPATIENT
Start: 2024-09-28 | End: 2024-10-03 | Stop reason: HOSPADM

## 2024-09-28 RX ORDER — IBUPROFEN 600 MG/1
1 TABLET ORAL
Status: DISCONTINUED | OUTPATIENT
Start: 2024-09-28 | End: 2024-10-03 | Stop reason: HOSPADM

## 2024-09-28 RX ORDER — NICOTINE POLACRILEX 4 MG
15 LOZENGE BUCCAL
Status: DISCONTINUED | OUTPATIENT
Start: 2024-09-28 | End: 2024-10-03 | Stop reason: HOSPADM

## 2024-09-28 RX ORDER — CARVEDILOL 25 MG/1
25 TABLET ORAL 2 TIMES DAILY WITH MEALS
Status: DISCONTINUED | OUTPATIENT
Start: 2024-09-28 | End: 2024-10-03 | Stop reason: HOSPADM

## 2024-09-28 RX ORDER — ATORVASTATIN CALCIUM 40 MG/1
40 TABLET, FILM COATED ORAL DAILY
Status: DISCONTINUED | OUTPATIENT
Start: 2024-09-28 | End: 2024-10-03 | Stop reason: HOSPADM

## 2024-09-28 RX ORDER — LIDOCAINE 4 G/G
1 PATCH TOPICAL
Status: DISCONTINUED | OUTPATIENT
Start: 2024-09-29 | End: 2024-09-28

## 2024-09-28 RX ORDER — ACETAMINOPHEN 325 MG/1
650 TABLET ORAL EVERY 4 HOURS PRN
Status: DISCONTINUED | OUTPATIENT
Start: 2024-09-28 | End: 2024-10-03 | Stop reason: HOSPADM

## 2024-09-28 RX ORDER — SODIUM CHLORIDE 9 MG/ML
40 INJECTION, SOLUTION INTRAVENOUS AS NEEDED
Status: DISCONTINUED | OUTPATIENT
Start: 2024-09-28 | End: 2024-10-03 | Stop reason: HOSPADM

## 2024-09-28 RX ORDER — PROCHLORPERAZINE EDISYLATE 5 MG/ML
5 INJECTION INTRAMUSCULAR; INTRAVENOUS EVERY 6 HOURS PRN
Status: DISCONTINUED | OUTPATIENT
Start: 2024-09-28 | End: 2024-10-03 | Stop reason: HOSPADM

## 2024-09-28 RX ORDER — SODIUM CHLORIDE 0.9 % (FLUSH) 0.9 %
10 SYRINGE (ML) INJECTION AS NEEDED
Status: DISCONTINUED | OUTPATIENT
Start: 2024-09-28 | End: 2024-10-03 | Stop reason: HOSPADM

## 2024-09-28 RX ORDER — ONDANSETRON 4 MG/1
4 TABLET, ORALLY DISINTEGRATING ORAL EVERY 6 HOURS PRN
Status: DISCONTINUED | OUTPATIENT
Start: 2024-09-28 | End: 2024-09-28

## 2024-09-28 RX ORDER — ONDANSETRON 2 MG/ML
4 INJECTION INTRAMUSCULAR; INTRAVENOUS ONCE
Status: COMPLETED | OUTPATIENT
Start: 2024-09-28 | End: 2024-09-28

## 2024-09-28 RX ORDER — LIDOCAINE 4 G/G
1 PATCH TOPICAL DAILY
Status: DISCONTINUED | OUTPATIENT
Start: 2024-09-28 | End: 2024-10-03 | Stop reason: HOSPADM

## 2024-09-28 RX ORDER — PANTOPRAZOLE SODIUM 40 MG/1
40 TABLET, DELAYED RELEASE ORAL DAILY
Status: DISCONTINUED | OUTPATIENT
Start: 2024-09-28 | End: 2024-10-03 | Stop reason: HOSPADM

## 2024-09-28 RX ORDER — DEXTROSE MONOHYDRATE 25 G/50ML
25 INJECTION, SOLUTION INTRAVENOUS
Status: DISCONTINUED | OUTPATIENT
Start: 2024-09-28 | End: 2024-10-03 | Stop reason: HOSPADM

## 2024-09-28 RX ADMIN — HYDROMORPHONE HYDROCHLORIDE 0.5 MG: 1 INJECTION, SOLUTION INTRAMUSCULAR; INTRAVENOUS; SUBCUTANEOUS at 01:45

## 2024-09-28 RX ADMIN — SERTRALINE HYDROCHLORIDE 100 MG: 100 TABLET ORAL at 21:37

## 2024-09-28 RX ADMIN — HYDROMORPHONE HYDROCHLORIDE 0.5 MG: 1 INJECTION, SOLUTION INTRAMUSCULAR; INTRAVENOUS; SUBCUTANEOUS at 07:25

## 2024-09-28 RX ADMIN — LEVOTHYROXINE SODIUM 175 MCG: 75 TABLET ORAL at 11:11

## 2024-09-28 RX ADMIN — TORSEMIDE 100 MG: 20 TABLET ORAL at 11:12

## 2024-09-28 RX ADMIN — ACETAMINOPHEN 650 MG: 325 TABLET ORAL at 21:37

## 2024-09-28 RX ADMIN — ARIPIPRAZOLE 2 MG: 2 TABLET ORAL at 21:37

## 2024-09-28 RX ADMIN — SEVELAMER CARBONATE 800 MG: 800 TABLET, FILM COATED ORAL at 21:37

## 2024-09-28 RX ADMIN — ARIPIPRAZOLE 2 MG: 2 TABLET ORAL at 11:12

## 2024-09-28 RX ADMIN — ATORVASTATIN CALCIUM 40 MG: 40 TABLET, FILM COATED ORAL at 11:11

## 2024-09-28 RX ADMIN — SEVELAMER CARBONATE 800 MG: 800 TABLET, FILM COATED ORAL at 11:12

## 2024-09-28 RX ADMIN — ONDANSETRON 4 MG: 2 INJECTION INTRAMUSCULAR; INTRAVENOUS at 01:45

## 2024-09-28 RX ADMIN — Medication 10 ML: at 21:37

## 2024-09-28 RX ADMIN — MAGNESIUM SULFATE HEPTAHYDRATE 2 G: 40 INJECTION, SOLUTION INTRAVENOUS at 11:11

## 2024-09-28 RX ADMIN — CARVEDILOL 25 MG: 25 TABLET, FILM COATED ORAL at 11:12

## 2024-09-28 RX ADMIN — LIDOCAINE 1 PATCH: 4 PATCH TOPICAL at 22:04

## 2024-09-28 RX ADMIN — HYDROMORPHONE HYDROCHLORIDE 0.5 MG: 1 INJECTION, SOLUTION INTRAMUSCULAR; INTRAVENOUS; SUBCUTANEOUS at 11:21

## 2024-09-28 RX ADMIN — Medication 10 ML: at 09:40

## 2024-09-28 RX ADMIN — PANTOPRAZOLE SODIUM 40 MG: 40 TABLET, DELAYED RELEASE ORAL at 11:11

## 2024-09-28 NOTE — ED TRIAGE NOTES
Pt to ED from  home with daughter with reports of chest pain/diff breathing earlier today.  Pt now c/o generalized body aches, difficulty ambulating, and c/o chest/back pain.    Pt evaluated in ED earlier and DC home.    Pt home hemodialysis patient, had dialysis M,W,Th,F this week.

## 2024-09-28 NOTE — H&P
Patient Care Team:  Domingo Bravo MD as PCP - General (Internal Medicine)  Josephine Warren APRN as Nurse Practitioner (Pulmonary Disease)  Virginie Lopez APRN as Nurse Practitioner (Pulmonary Disease)    Chief complaint chest and abdominal pain    Subjective     Patient is a 79 y.o. male presents with chest and abdominal pain his pain is worsened with movement.  The pain has been worsening throughout the day.  He did take oxycodone, Tylenol and cyclobenzaprine at home that did not help with the pain.  He is a dialysis patient has had dialysis as scheduled.  He did apparently have a fall several days ago.  He has a history of    Review of Systems   Pertinent items are noted in HPI    History  Past Medical History:   Diagnosis Date    Abnormal stress test     Anemia     IRON INFUSIONS WITH DIALYSIS    Anesthesia     WITH HIP REPLACEMENT DAUGHTER BELIEVES HAD SVT 2000    Ankle pain, right     Anxiety and depression     CKD (chronic kidney disease), stage IV     DIALYSIS [id number] SHILEY IN RIGHT CHEST    Diabetes     Gout     High cholesterol     History of peritoneal dialysis     HL (hearing loss)     Hyperkalemia     Hypertension     Hypothyroidism     Insomnia     Night terrors     Psoriasis     Psoriasis     Sleep walking     Thrombocytopenia     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    Vitiligo      Past Surgical History:   Procedure Laterality Date    ANKLE SURGERY Right 11/1990    APPENDECTOMY N/A 1954    ARTERIOVENOUS FISTULA/SHUNT SURGERY Left 07/16/2024    Procedure: Creation of left arm arteriovenous fistula;  Surgeon: Moses Mir MD;  Location: [facility] MAIN OR;  Service: Vascular;  Laterality: Left;    BRONCHOSCOPY N/A 03/01/2024    Procedure: BRONCHOSCOPY WITH BAL AND WASHINGS;  Surgeon: Sandra Espinal MD;  Location: [facility] ENDOSCOPY;  Service: Pulmonary;  Laterality: N/A;  PNEUMONIA    BRONCHOSCOPY N/A 08/30/2024    Procedure: BRONCHOSCOPY WITH BALS AND WASHINGS;  Surgeon: Kylie  MD Sandra;  Location: [facility] ENDOSCOPY;  Service: Pulmonary;  Laterality: N/A;  MUCOUS PLUGGING    CARDIAC CATHETERIZATION N/A 05/29/2024    Procedure: Left Heart Cath with possible coronary angioplasty;  Surgeon: Stephan Nichols MD;  Location: [facility] CATH INVASIVE LOCATION;  Service: Cardiology;  Laterality: N/A;    COLONOSCOPY N/A 06/2022    [facility]    HIP BIPOLAR REPLACEMENT Right 01/2000    INSERTION HEMODIALYSIS CATHETER Left 04/09/2024    Procedure: HEMODIALYSIS CATHETER INSERTION;  Surgeon: Jose Berry MD;  Location: [facility] OR;  Service: General;  Laterality: Left;    INSERTION HEMODIALYSIS CATHETER N/A 04/12/2024    Procedure: Tunneled hemodialysis catheter insertion;  Surgeon: Enrique Vinson MD;  Location: [facility] OR;  Service: Vascular;  Laterality: N/A;    INSERTION PERITONEAL DIALYSIS CATHETER N/A 03/27/2023    Procedure: LAPAROSCOPIC INSERTION PERITONEAL DIALYSIS CATHETER, LAPAROSCOPIC OMENTOPEXY WITH LYSIS OF ADHESIONS;  Surgeon: Jose Berry MD;  Location: [facility] OR;  Service: General;  Laterality: N/A;    INSERTION PERITONEAL DIALYSIS CATHETER Left 07/23/2023    Procedure: REVISION OF PERITONEAL DIALYSIS CATHETER;  Surgeon: Radha Oreilly MD;  Location: [facility] OR;  Service: General;  Laterality: Left;    REMOVAL PERITONEAL DIALYSIS CATHETER N/A 04/09/2024    Procedure: REMOVAL PERITONEAL DIALYSIS CATHETER;  Surgeon: Jose Berry MD;  Location: [facility] OR;  Service: General;  Laterality: N/A;    RENAL BIOPSY Left 07/15/2022    UPPER GASTROINTESTINAL ENDOSCOPY      WRIST SURGERY      UNSURE WHICH SIDE DAUGHTER REPORTS HAD SEVERE  CUT FROM WINDOW AND THEY HAD TO DO RECONSTRUCTIVE SURGERY WITH VESSELS AND NERVES     Family History   Problem Relation Age of Onset    Diabetes Mother     Heart disease Father     Colon cancer Sister 77    Cancer Sister 35    Diabetes Sister     Diabetes Brother     Sleep apnea Paternal Aunt      Heart disease Paternal Uncle     Sleep apnea Daughter     Malvan Hyperthermia Neg Hx      Social History     Tobacco Use    Smoking status: Former     Current packs/day: 0.00     Average packs/day: 1 pack/day for 10.0 years (10.0 ttl pk-yrs)     Types: Cigarettes     Start date:      Quit date: 2000     Years since quittin.7     Passive exposure: Past    Smokeless tobacco: Never    Tobacco comments:     quit 25 years ago, chews nicotine gum in past   Vaping Use    Vaping status: Never Used   Substance Use Topics    Alcohol use: Yes     Comment: 1 DRINK/DAY-rarely    Drug use: Never     (Not in a hospital admission)   Allergies:  Patient has no known allergies.    Objective     Vital Signs  Temp:  [98.2 °F (36.8 °C)] 98.2 °F (36.8 °C)  Heart Rate:  [82] 82  Resp:  [20] 20  BP: (175)/(68) 175/68    Physical Exam:      General Appearance:  Alert, cooperative, in no acute distress   Head:  Normocephalic, without obvious abnormality, atraumatic   Eyes:  Lids and lashes normal, conjunctivae and sclerae normal, no icterus, no pallor, corneas clear, PERRLA   Ears:  Ears appear intact with no abnormalities noted   Throat:  No oral lesions, no thrush, oral mucosa moist   Neck:  No adenopathy, supple, trachea midline, no thyromegaly, no carotid bruit, no JVD   Back:  No kyphosis present, no scoliosis present, no skin lesions, erythema or scars, no tenderness to percussion or palpation, range of motion normal   Lungs:  Clear to auscultation, respirations regular, even and unlabored    Heart:  Regular rhythm and normal rate, normal S1 and S2, no murmur, no gallop, no rub, no click   Chest Wall:  No abnormalities observed   Abdomen:  Normal bowel sounds, no masses, no organomegaly, soft non-tender, non-distended, no guarding, no rebound tenderness   Rectal:  Deferred   Extremities:  Moves all extremities well, no edema, no cyanosis, no redness   Pulses:  Pulses palpable and equal bilaterally   Skin:  No bleeding,  bruising or rash   Lymph nodes:  No palpable adenopathy   Neurologic:  Cranial nerves 2 - 12 grossly intact, sensation intact, DTR present and equal bilaterally       Results Review:    I reviewed the patient's new clinical results.  I reviewed the patient's new imaging results and agree with the interpretation.  I reviewed the patient's other test results and agree with the interpretation  I personally viewed and interpreted the patient's EKG/Telemetry data    Assessment & Plan       Intractable pain      Admit for observation  As needed Dilaudid  PT OT  Supportive care  Full code    I discussed the patients findings and my recommendations with patient.     Shiraz Tarango MD  09/28/24  02:52 EDT

## 2024-09-28 NOTE — PLAN OF CARE
Goal Outcome Evaluation:              Outcome Evaluation: Pt is intermittently confused, informed MD. Pt recieved dilaudid during AM for severe pain in upper abdomen. Pt is restless. Family is at bedside. Continue plan of care.                                 Notify patient we need to visit to discuss what caused him not to go to work and what has trigger his job requesting a note to return to work.  Give an appointment

## 2024-09-28 NOTE — PROGRESS NOTES
Patient is a 79-year-old diabetes, ESRD on HD, psoriasis, hypertension, dyslipidemia, mild CAD, restrictive lung disease, vertigo, GERD without esophagitis, E. coli bacteremia with septic shock, C. difficile colitis and previous tobacco use who presented with chest pain and abdominal pain. Pain was worsening throughout the day and aggravated with movement. CT abdomen and pelvis along with chest was performed on presentation which showed Interval resolution of previously described 10 mm left lower lobe nodule, some mild atelectasis; colonic diverticulosis without diverticulitis. Surgically absent appendix; Large calculus is again identified in the right renal pelvis; bilateral renal cortical thinning with bilateral renal cysts. Also showed lumbar degenerative disease with chronic compression fractures at L3 and T12 as well as at T10. Patient was started on pain medications and admitted for further evaluation and management.    Interval event:  Patient was laying in bed; awake on verbal command and answering questions; falling asleep on middle of conversation. As needed IV dilaudid on hold for possible sedation. Mentioning having pain in epigastric region. No pain on deep and superficial palpation throughout the abdomen.    Plan:  -Supportive management.  -Holding as needed IV dilaudid given possible sedation.  -Had cardiac cath recently; showed mild CAD.  -Elevated troponin but could be in the setting of ESRD; better than prior. EKG non significant.  -Obtaining TTE to r/o possible pericarditis though low probability given non significant troponin and EKG. Does have epigastric pain worse with movement and elevated ESR, CRP.  -CT scan showing lumbar degenerative disease with chronic compression fractures at L3 and T12 as well as at T10. Pain could be related to it.-IF persistent, will obtain MRI of thoracic and lumbar spine.  -Nephrologist consulted; plan for HD possibly on Monday. Appreciate further  input.  -Continue rest of the current management.

## 2024-09-28 NOTE — THERAPY EVALUATION
Acute Care - Physical Therapy Initial Evaluation  BRIA Sullivan     Patient Name: Nelson Wang  : 1945  MRN: [id number]  Today's Date: 2024      Visit Dx:     ICD-10-CM ICD-9-CM   1. ESRD on dialysis  N18.6 585.6    Z99.2 V45.11   2. Intractable pain  R52 780.96   3. Difficulty walking  R26.2 719.7     Patient Active Problem List   Diagnosis    Essential hypertension    Bradycardia, sinus    Anemia due to chronic kidney disease    Hypothyroidism    Idiopathic gout    Proteinuria    Psoriasis    Thrombocytopenia    Type 2 diabetes mellitus without complication    CKD (chronic kidney disease), stage IV    Hyperlipidemia    Monoclonal gammopathy of unknown significance (MGUS)    Stage 5 chronic kidney disease    Chronic gout without tophus    Peritoneal dialysis catheter dysfunction    Medicare annual wellness visit, subsequent    Chronic cough    Peritonitis associated with peritoneal dialysis    Recurrent pneumonia    Acute on chronic respiratory failure with hypoxia    End stage renal disease    Aspiration pneumonitis    Sepsis    Type 2 diabetes mellitus, with long-term current use of insulin    GERD without esophagitis    Immunosuppression due to drug therapy    Bipolar disorder    Chronic respiratory failure with hypoxia    Shortness of breath    Abnormal stress test    ESRD on dialysis    Abnormal chest CT    Encounter for screening for malignant neoplasm of colon    History of colon polyps    Family history of colon cancer    Intractable pain     Past Medical History:   Diagnosis Date    Abnormal stress test     Anemia     IRON INFUSIONS WITH DIALYSIS    Anesthesia     WITH HIP REPLACEMENT DAUGHTER BELIEVES HAD SVT     Ankle pain, right     Anxiety and depression     CKD (chronic kidney disease), stage IV     DIALYSIS [id number] SHILEY IN RIGHT CHEST    Diabetes     Gout     High cholesterol     History of peritoneal dialysis     HL (hearing loss)     Hyperkalemia     Hypertension      Hypothyroidism     Insomnia     Night terrors     Psoriasis     Psoriasis     Sleep walking     Thrombocytopenia     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    Vitiligo      Past Surgical History:   Procedure Laterality Date    ANKLE SURGERY Right 11/1990    APPENDECTOMY N/A 1954    ARTERIOVENOUS FISTULA/SHUNT SURGERY Left 07/16/2024    Procedure: Creation of left arm arteriovenous fistula;  Surgeon: Moses Mir MD;  Location: [facility] MAIN OR;  Service: Vascular;  Laterality: Left;    BRONCHOSCOPY N/A 03/01/2024    Procedure: BRONCHOSCOPY WITH BAL AND WASHINGS;  Surgeon: Sandra Espinal MD;  Location: [facility] ENDOSCOPY;  Service: Pulmonary;  Laterality: N/A;  PNEUMONIA    BRONCHOSCOPY N/A 08/30/2024    Procedure: BRONCHOSCOPY WITH BALS AND WASHINGS;  Surgeon: Sandra Espinal MD;  Location: [facility] ENDOSCOPY;  Service: Pulmonary;  Laterality: N/A;  MUCOUS PLUGGING    CARDIAC CATHETERIZATION N/A 05/29/2024    Procedure: Left Heart Cath with possible coronary angioplasty;  Surgeon: Stephan Nichols MD;  Location: [facility] CATH INVASIVE LOCATION;  Service: Cardiology;  Laterality: N/A;    COLONOSCOPY N/A 06/2022    [facility]    HIP BIPOLAR REPLACEMENT Right 01/2000    INSERTION HEMODIALYSIS CATHETER Left 04/09/2024    Procedure: HEMODIALYSIS CATHETER INSERTION;  Surgeon: Jose Berry MD;  Location: [facility] MAIN OR;  Service: General;  Laterality: Left;    INSERTION HEMODIALYSIS CATHETER N/A 04/12/2024    Procedure: Tunneled hemodialysis catheter insertion;  Surgeon: Enrique Vinson MD;  Location: [facility] MAIN OR;  Service: Vascular;  Laterality: N/A;    INSERTION PERITONEAL DIALYSIS CATHETER N/A 03/27/2023    Procedure: LAPAROSCOPIC INSERTION PERITONEAL DIALYSIS CATHETER, LAPAROSCOPIC OMENTOPEXY WITH LYSIS OF ADHESIONS;  Surgeon: Jose Berry MD;  Location: [facility] OR;  Service: General;  Laterality: N/A;    INSERTION PERITONEAL DIALYSIS CATHETER Left 07/23/2023    Procedure: REVISION OF  PERITONEAL DIALYSIS CATHETER;  Surgeon: Radha Oreilly MD;  Location: [facility] MAIN OR;  Service: General;  Laterality: Left;    REMOVAL PERITONEAL DIALYSIS CATHETER N/A 04/09/2024    Procedure: REMOVAL PERITONEAL DIALYSIS CATHETER;  Surgeon: Jose Berry MD;  Location: [facility] MAIN OR;  Service: General;  Laterality: N/A;    RENAL BIOPSY Left 07/15/2022    UPPER GASTROINTESTINAL ENDOSCOPY      WRIST SURGERY      UNSURE WHICH SIDE DAUGHTER REPORTS HAD SEVERE  CUT FROM WINDOW AND THEY HAD TO DO RECONSTRUCTIVE SURGERY WITH VESSELS AND NERVES     PT Assessment (Last 12 Hours)       PT Evaluation and Treatment       Row Name 09/28/24 1257          Physical Therapy Time and Intention    Subjective Information no complaints  -     Document Type evaluation  -     Mode of Treatment individual therapy;physical therapy  -     Patient Effort good  -     Symptoms Noted During/After Treatment none  -JH       Row Name 09/28/24 1257          General Information    Patient Profile Reviewed yes  -     Patient Observations alert;cooperative;agree to therapy  -     Barriers to Rehab none identified  -JH       Row Name 09/28/24 1257          Living Environment    Current Living Arrangements home  -     People in Home spouse;child(laura), adult  -     Primary Care Provided by self;spouse/significant other  -JH       Row Name 09/28/24 1257          Home Use of Assistive/Adaptive Equipment    Equipment Currently Used at Home none  -JH       Row Name 09/28/24 1257          Pain    Pretreatment Pain Rating 0/10 - no pain  -     Posttreatment Pain Rating 0/10 - no pain  -JH       Row Name 09/28/24 1257          Range of Motion (ROM)    Range of Motion bilateral lower extremities;ROM is [facility] 09/28/24 1257          Strength (Manual Muscle Testing)    Strength (Manual Muscle Testing) bilateral lower extremities;strength is [facility] 09/28/24 1257          Bed Mobility    Bed  Mobility bed mobility (all) activities  -     All Activities, [place] (Bed Mobility) standby assist  -[facility] 09/28/24 1257          Transfers    Transfers sit-stand transfer;stand-sit transfer  -JH       Row Name 09/28/24 1257          Sit-Stand Transfer    Sit-Stand [place] (Transfers) contact guard  -     Assistive Device (Sit-Stand Transfers) walker, front-wheeled  -[facility] 09/28/24 1257          Stand-Sit Transfer    Stand-Sit [place] (Transfers) contact guard  -     Assistive Device (Stand-Sit Transfers) walker, front-wheeled  -[facility] 09/28/24 1257          Gait/Stairs (Locomotion)    Gait/Stairs Locomotion gait/ambulation assistive device  -     [place] Level (Gait) contact guard  -     Assistive Device (Gait) walker, front-wheeled  -     Patient was able to Ambulate yes  -     Distance in Feet (Gait) 25  -JH       Row Name 09/28/24 1257          Safety Issues, Functional Mobility    Impairments Affecting Function (Mobility) balance;endurance/activity tolerance;strength  -JH       Row Name 09/28/24 1257          Balance    Balance Assessment standing dynamic balance  -     Dynamic Standing Balance contact guard;verbal cues  -     Position/Device Used, Standing Balance walker, front-wheeled  -[facility] 09/28/24 1257          Plan of Care Review    Plan of Care Reviewed With patient  -     Outcome Evaluation Pt presents with decreased ambulatory and transfer function due to weakness and diminished endurance. Pt will benefit from skilled physical therapy to address deficits and improve safety.  -[facility] 09/28/24 1257          Positioning and Restraints    Pre-Treatment Position in bed  -     Post Treatment Position bed  -     In Bed call light within reach;encouraged to call for assist;exit alarm on;with nsg  -JH       Row Name 09/28/24 1257          Therapy Assessment/Plan (PT)    Patient/Family Therapy Goals Statement  (PT) Return home  -     Rehab Potential (PT) good, to achieve stated therapy goals  -     Criteria for Skilled Interventions Met (PT) yes;skilled treatment is necessary  -     Therapy Frequency (PT) daily  -     Predicted Duration of Therapy Intervention (PT) 10  -     Problem List (PT) problems related to;balance;mobility;strength  -     Activity Limitations Related to Problem List (PT) unable to ambulate safely;unable to transfer safely  -JH       Row Name 09/28/24 1257          Therapy Plan Review/Discharge Plan (PT)    Therapy Plan Review (PT) evaluation/treatment results reviewed;patient;participants voiced agreement with care plan  -JH       Row Name 09/28/24 1257          Physical Therapy Goals    Bed Mobility Goal Selection (PT) bed mobility, PT goal 1  -     Transfer Goal Selection (PT) transfer, PT goal 1  -     Gait Training Goal Selection (PT) gait training, PT goal 1  -JH       Row Name 09/28/24 1257          Bed Mobility Goal 1 (PT)    Activity/Assistive Device (Bed Mobility Goal 1, PT) bed mobility activities, all  -     [place] Level/Cues Needed (Bed Mobility Goal 1, PT) independent  -JH     Time Frame (Bed Mobility Goal 1, PT) long term goal (LTG);10 days  -JH       Row Name 09/28/24 1257          Transfer Goal 1 (PT)    Activity/Assistive Device (Transfer Goal 1, PT) transfers, all;walker, rolling  -JH     [place] Level/Cues Needed (Transfer Goal 1, PT) independent  -JH     Time Frame (Transfer Goal 1, PT) long term goal (LTG);10 days  -JH       Row Name 09/28/24 1257          Gait Training Goal 1 (PT)    Activity/Assistive Device (Gait Training Goal 1, PT) gait (walking locomotion);assistive device use;walker, rolling  -     [place] Level (Gait Training Goal 1, PT) independent  -     Distance (Gait Training Goal 1, PT) 250  -JH     Time Frame (Gait Training Goal 1, PT) long term goal (LTG);10 days  -               User Key  (r) = Recorded By, (t) = Taken By,  (c) = Cosigned By      Initials Name Provider Type    Lorne Chadwick, PT Physical Therapist                    Physical Therapy Education       Title: PT OT SLP Therapies (Done)       Topic: Physical Therapy (Done)       Point: Mobility training (Done)       Learning Progress Summary             Patient Acceptance, E, VU by  at 9/28/2024 1308                         Point: Home exercise program (Done)       Learning Progress Summary             Patient Acceptance, E, VU by  at 9/28/2024 1308                         Point: Body mechanics (Done)       Learning Progress Summary             Patient Acceptance, E, VU by  at 9/28/2024 1308                         Point: Precautions (Done)       Learning Progress Summary             Patient Acceptance, E, VU by  at 9/28/2024 1308                                         User Key       Initials Effective Dates Name Provider Type Discipline     05/08/23 -  Lorne Parmar PT Physical Therapist PT                  PT Recommendation and Plan  Anticipated Discharge Disposition (PT): inpatient rehabilitation facility  Planned Therapy Interventions (PT): balance training, bed mobility training, gait training, home exercise program, stair training, strengthening, transfer training  Therapy Frequency (PT): daily  Plan of Care Reviewed With: patient  Outcome Evaluation: Pt presents with decreased ambulatory and transfer function due to weakness and diminished endurance. Pt will benefit from skilled physical therapy to address deficits and improve safety.   Outcome Measures       Row Name 09/28/24 1300             How much help from another person do you currently need...    Turning from your back to your side while in flat bed without using bedrails? 3  -JH      Moving from lying on back to sitting on the side of a flat bed without bedrails? 3  -JH      Moving to and from a bed to a chair (including a wheelchair)? 3  -JH      Standing up from a chair using your arms (e.g.,  wheelchair, bedside chair)? 3  -      Climbing 3-5 steps with a railing? 2  -      To walk in hospital room? 3  -      AM-PAC 6 Clicks Score (PT) 17  -      Highest Level of Mobility Goal 5 --> Static standing  -         Functional Assessment    Outcome Measure Options AM-PAC 6 Clicks Basic Mobility (PT)  -                User Key  (r) = Recorded By, (t) = Taken By, (c) = Cosigned By      Initials Name Provider Type    Lorne Chadwick, JAYME Physical Therapist                     Time Calculation:    PT Charges       Row Name 09/28/24 1257             Time Calculation    PT Received On 09/28/24  -      PT Goal Re-Cert Due Date 10/07/24  -         Untimed Charges    PT Eval/Re-eval Minutes 38  -         Total Minutes    Untimed Charges Total Minutes 38  -       Total Minutes 38  -                User Key  (r) = Recorded By, (t) = Taken By, (c) = Cosigned By      Initials Name Provider Type    Lorne Chadwick, JAYME Physical Therapist                  Therapy Charges for Today       Code Description Service Date Service Provider Modifiers Qty    [id number] HC PT EVAL LOW COMPLEXITY 3 9/28/2024 Lorne Parmar, PT GP 1            PT G-Codes  Outcome Measure Options: AM-PAC 6 Clicks Basic Mobility (PT)  AM-PAC 6 Clicks Score (PT): 17    Lorne Parmar PT  9/28/2024

## 2024-09-28 NOTE — PLAN OF CARE
Goal Outcome Evaluation:  Plan of Care Reviewed With: patient        Progress: no change  Outcome Evaluation: VSS, no c/o pain, rested since arrival on floor, continue plan of care

## 2024-09-28 NOTE — ED PROVIDER NOTES
Time: 1:16 AM EDT  Date of encounter:  9/28/2024  Independent Historian/Clinical History and Information was obtained by:   Patient and Family    History is limited by: N/A    Chief Complaint: Chest pain      History of Present Illness:  Patient is a 79 y.o. year old male who presents to the emergency department for evaluation of chest pain and abdominal pain    Patient states he has a pain within his torso that is worsened with any movement.  He was seen in the emergency department earlier today for the same symptoms but has not grown much worse despite treatment at home with oxycodone, Tylenol, cyclobenzaprine.  He states he has had no relief with the medication at home.  His symptoms started after an normal treatment of dialysis.  He denies any falls or injuries.  No fevers or chills.      Patient Care Team  Primary Care Provider: Domingo Bravo MD    Past Medical History:     No Known Allergies  Past Medical History:   Diagnosis Date    Abnormal stress test     Anemia     IRON INFUSIONS WITH DIALYSIS    Anesthesia     WITH HIP REPLACEMENT DAUGHTER BELIEVES HAD SVT 2000    Ankle pain, right     Anxiety and depression     CKD (chronic kidney disease), stage IV     DIALYSIS [id number] SHILEY IN RIGHT CHEST    Diabetes     Gout     High cholesterol     History of peritoneal dialysis     HL (hearing loss)     Hyperkalemia     Hypertension     Hypothyroidism     Insomnia     Night terrors     Psoriasis     Psoriasis     Sleep walking     Thrombocytopenia     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    Vitiligo      Past Surgical History:   Procedure Laterality Date    ANKLE SURGERY Right 11/1990    APPENDECTOMY N/A 1954    ARTERIOVENOUS FISTULA/SHUNT SURGERY Left 07/16/2024    Procedure: Creation of left arm arteriovenous fistula;  Surgeon: Moses Mir MD;  Location: [facility] OR;  Service: Vascular;  Laterality: Left;    BRONCHOSCOPY N/A 03/01/2024    Procedure: BRONCHOSCOPY WITH BAL AND WASHINGS;   Surgeon: Sandra Espinal MD;  Location: [facility] ENDOSCOPY;  Service: Pulmonary;  Laterality: N/A;  PNEUMONIA    BRONCHOSCOPY N/A 08/30/2024    Procedure: BRONCHOSCOPY WITH BALS AND WASHINGS;  Surgeon: Sandra Espinal MD;  Location: [facility] ENDOSCOPY;  Service: Pulmonary;  Laterality: N/A;  MUCOUS PLUGGING    CARDIAC CATHETERIZATION N/A 05/29/2024    Procedure: Left Heart Cath with possible coronary angioplasty;  Surgeon: Stephan Nichols MD;  Location: [facility] CATH INVASIVE LOCATION;  Service: Cardiology;  Laterality: N/A;    COLONOSCOPY N/A 06/2022    [facility]    HIP BIPOLAR REPLACEMENT Right 01/2000    INSERTION HEMODIALYSIS CATHETER Left 04/09/2024    Procedure: HEMODIALYSIS CATHETER INSERTION;  Surgeon: Jose Berry MD;  Location: [facility] OR;  Service: General;  Laterality: Left;    INSERTION HEMODIALYSIS CATHETER N/A 04/12/2024    Procedure: Tunneled hemodialysis catheter insertion;  Surgeon: Enrique Vinson MD;  Location: [facility] OR;  Service: Vascular;  Laterality: N/A;    INSERTION PERITONEAL DIALYSIS CATHETER N/A 03/27/2023    Procedure: LAPAROSCOPIC INSERTION PERITONEAL DIALYSIS CATHETER, LAPAROSCOPIC OMENTOPEXY WITH LYSIS OF ADHESIONS;  Surgeon: Jose Berry MD;  Location: [facility] OR;  Service: General;  Laterality: N/A;    INSERTION PERITONEAL DIALYSIS CATHETER Left 07/23/2023    Procedure: REVISION OF PERITONEAL DIALYSIS CATHETER;  Surgeon: Radha Oreilly MD;  Location: [facility] MAIN OR;  Service: General;  Laterality: Left;    REMOVAL PERITONEAL DIALYSIS CATHETER N/A 04/09/2024    Procedure: REMOVAL PERITONEAL DIALYSIS CATHETER;  Surgeon: Jose Berry MD;  Location: [facility] MAIN OR;  Service: General;  Laterality: N/A;    RENAL BIOPSY Left 07/15/2022    UPPER GASTROINTESTINAL ENDOSCOPY      WRIST SURGERY      UNSURE WHICH SIDE DAUGHTER REPORTS HAD SEVERE  CUT FROM WINDOW AND THEY HAD TO DO RECONSTRUCTIVE SURGERY WITH VESSELS AND NERVES      Family History   Problem Relation Age of Onset    Diabetes Mother     Heart disease Father     Colon cancer Sister 77    Cancer Sister 35    Diabetes Sister     Diabetes Brother     Sleep apnea Paternal Aunt     Heart disease Paternal Uncle     Sleep apnea Daughter     Malig Hyperthermia Neg Hx        Home Medications:  Prior to Admission medications    Medication Sig Start Date End Date Taking? Authorizing Provider   allopurinol (ZYLOPRIM) 100 MG tablet Take 1 tablet by mouth Daily. As needed 3/11/24   Serena Matute MD   ARIPiprazole (ABILIFY) 2 MG tablet Take 1 tablet by mouth 2 (Two) Times a Day. 9/10/24   Domingo Bravo MD   atorvastatin (LIPITOR) 40 MG tablet Take 1 tablet by mouth Daily. 7/17/24   Domingo Bravo MD   carvedilol (COREG) 25 MG tablet Take 1 tablet by mouth 2 (Two) Times a Day With Meals.    Serena Matute MD   cloNIDine (CATAPRES) 0.2 MG tablet Take 1 tablet by mouth Every 8 (Eight) Hours As Needed for High Blood Pressure. 6/18/24   Serena Matute MD   clopidogrel (PLAVIX) 75 MG tablet  8/14/24   Serena Matute MD   Continuous Glucose  (Dexcom G7 ) device Use 1 Units Continuous.  Patient not taking: Reported on 8/14/2024 7/17/24   Domingo Bravo MD   Continuous Glucose Sensor (Dexcom G7 Sensor) misc Use 1 Units Continuous.  Patient not taking: Reported on 8/14/2024 7/17/24   Domingo Bravo MD   Insulin Glargine (LANTUS SOLOSTAR) 100 UNIT/ML injection pen Inject 10 Units under the skin into the appropriate area as directed Every Night. 4/14/24   Cristian Santacruz MD   Levothyroxine Sodium 175 MCG capsule Take 175 mcg by mouth Daily. 9/10/24   Domingo Bravo MD   lidocaine-prilocaine (EMLA) 2.5-2.5 % cream APPLY SMALL AMOUNT TO ACCESS SITE (AVF) 1 HOUR BEFORE DIALYSIS. COVER WITH OCCLUSIVE DRESSING (SARAN WRAP) 8/30/24   Serena Matute MD   Lokelma 10 g packet Take 10 g by mouth Every Other Day. TAKES MON, WED, FRI,  SUN 24   Serena Matute MD   Methoxy PEG-Epoetin Beta (MIRCERA IJ) 30 mcg Every 14 (Fourteen) Days. RECEIVES DURING DIALYSIS 24  Serena Matute MD   midodrine (PROAMATINE) 2.5 MG tablet Take 1 tablet by mouth 3 (Three) Times a Day Before Meals. On dialysis days 24   Aylin Nicole APRN   pantoprazole (PROTONIX) 40 MG EC tablet Take 1 tablet by mouth Daily. 24   Sandra Espinal MD   sertraline (ZOLOFT) 100 MG tablet Take 1 tablet by mouth Every Night. 24   Domingo Bravo MD   sevelamer (RENVELA) 800 MG tablet Take 1 tablet by mouth 2 (Two) Times a Day. 24   Domingo Bravo MD   torsemide (DEMADEX) 100 MG tablet Take 1 tablet by mouth Daily. 24   Domingo Bravo MD   Turmeric (QC TUMERIC COMPLEX PO) Take 2 capsules by mouth 2 (Two) Times a Day.    Serena Matute MD        Social History:   Social History     Tobacco Use    Smoking status: Former     Current packs/day: 0.00     Average packs/day: 1 pack/day for 10.0 years (10.0 ttl pk-yrs)     Types: Cigarettes     Start date:      Quit date: 2000     Years since quittin.7     Passive exposure: Past    Smokeless tobacco: Never    Tobacco comments:     quit 25 years ago, chews nicotine gum in past   Vaping Use    Vaping status: Never Used   Substance Use Topics    Alcohol use: Yes     Comment: 1 DRINK/DAY-rarely    Drug use: Never         Review of Systems:  Review of Systems   Constitutional:  Negative for chills and fever.   HENT:  Negative for congestion, ear pain and sore throat.    Eyes:  Negative for pain.   Respiratory:  Negative for cough, chest tightness and shortness of breath.    Cardiovascular:  Positive for chest pain.   Gastrointestinal:  Positive for abdominal pain. Negative for diarrhea, nausea and vomiting.   Genitourinary:  Negative for flank pain and hematuria.   Musculoskeletal:  Positive for back pain. Negative for joint swelling.   Skin:  Negative for pallor.  "  Neurological:  Negative for seizures and headaches.   All other systems reviewed and are negative.       Physical Exam:  /77 (BP Location: Right arm, Patient Position: Lying)   Pulse 87   Temp 98 °F (36.7 °C) (Oral)   Resp 18   Ht 167.6 cm (66\")   Wt 77.2 kg (170 lb 3.1 oz)   SpO2 100%   BMI 27.47 kg/m²     Physical Exam  Vitals and nursing note reviewed.   Constitutional:       General: He is not in acute distress.     Appearance: Normal appearance. He is not toxic-appearing.      Comments: Patient appears uncomfortable.   HENT:      Head: Normocephalic and atraumatic.      Jaw: There is normal jaw occlusion.   Eyes:      General: Lids are normal.      Extraocular Movements: Extraocular movements intact.      Conjunctiva/sclera: Conjunctivae normal.      Pupils: Pupils are equal, round, and reactive to light.   Cardiovascular:      Rate and Rhythm: Normal rate and regular rhythm.      Pulses: Normal pulses.      Heart sounds: Normal heart sounds.   Pulmonary:      Effort: Pulmonary effort is normal. No respiratory distress.      Breath sounds: Normal breath sounds. No wheezing or rhonchi.   Chest:      Comments: Right chest wall tunneled dialysis catheter present, no evidence of infection.  Abdominal:      General: Abdomen is flat.      Palpations: Abdomen is soft.      Tenderness: There is no abdominal tenderness. There is no guarding or rebound.   Musculoskeletal:         General: Normal range of motion.      Cervical back: Normal range of motion and neck supple.      Right lower leg: No edema.      Left lower leg: No edema.   Skin:     General: Skin is warm and dry.   Neurological:      Mental Status: He is alert and oriented to person, place, and time. Mental status is at baseline.   Psychiatric:         Mood and Affect: Mood normal.               Procedures:  Procedures      Medical Decision Making:      Comorbidities that affect care:    Hypertension, diabetes, chronic kidney disease on " dialysis, psoriasis, hypercholesterolemia, gout, thrombocytopenia    External Notes reviewed:    Previous Clinic Note: Outpatient gastroenterology visit 9/26/2024      The following orders were placed and all results were independently analyzed by me:  Orders Placed This Encounter   Procedures    Respiratory Panel PCR w/COVID-19(SARS-CoV-2) RA/TANIA/GAVIN/PAD/COR/NENA In-House, NP Swab in UTM/VTM, 2 HR TAT - Swab, Nasopharynx    XR Chest 1 View    CT Abdomen Pelvis Without Contrast    CT Chest Without Contrast Diagnostic    [place] Draw    High Sensitivity Troponin T    Comprehensive Metabolic Panel    Lipase    BNP    Magnesium    CBC Auto Differential    CK    High Sensitivity Troponin T 2Hr    CBC Auto Differential    Basic Metabolic Panel    Diet: Cardiac; Healthy Heart (2-3 Na+); Fluid Consistency: Thin (IDDSI 0)    Undress & Gown    Continuous Pulse Oximetry    Vital Signs    Intake & Output    Notify Provider    Saline Lock & Maintain IV Access    Place Sequential Compression Device    Maintain Sequential Compression Device    Up in Chair    Code Status and Medical Interventions: CPR (Attempt to Resuscitate); Full Support    Hospitalist (on-call MD unless specified)    OT Consult: Eval & Treat ADL Performance Below Baseline    PT Consult: Eval & Treat Discharge Placement Assessment    Oxygen Therapy- Nasal Cannula; Titrate 1-6 LPM Per SpO2; 90 - 95%    ECG 12 Lead ED Triage Standing Order; Chest Pain    ECG 12 Lead ED Triage Standing Order; Chest Pain    Insert Peripheral IV    Insert Peripheral IV    Initiate Observation Status    CBC & Differential    Green Top (Gel)    Lavender Top    Gold Top - SST    Light Blue Top       Medications Given in the Emergency Department:  Medications   sodium chloride 0.9 % flush 10 mL (has no administration in time range)   aspirin chewable tablet 324 mg (324 mg Oral Not Given 9/28/24 0119)   sodium chloride 0.9 % flush 10 mL (has no administration in time range)   sodium  chloride 0.9 % flush 10 mL (has no administration in time range)   sodium chloride 0.9 % infusion 40 mL (has no administration in time range)   acetaminophen (TYLENOL) tablet 650 mg (has no administration in time range)     Or   acetaminophen (TYLENOL) 160 MG/5ML oral solution 650 mg (has no administration in time range)     Or   acetaminophen (TYLENOL) suppository 650 mg (has no administration in time range)   ondansetron ODT (ZOFRAN-ODT) disintegrating tablet 4 mg (has no administration in time range)     Or   ondansetron (ZOFRAN) injection 4 mg (has no administration in time range)   HYDROmorphone (DILAUDID) injection 0.5 mg (has no administration in time range)   ondansetron (ZOFRAN) injection 4 mg (4 mg Intravenous Given 9/28/24 0145)   HYDROmorphone (DILAUDID) injection 0.5 mg (0.5 mg Intravenous Given 9/28/24 0145)        ED Course:    ED Course as of 09/28/24 0711   Sat Sep 28, 2024   0124 My interpretation of EKG: Sinus rhythm 84, no acute ischemia, unchanged from previous [JS]      ED Course User Index  [JS] Lorne Sánchez MD       Labs:    Lab Results (last 24 hours)       Procedure Component Value Units Date/Time    CBC & Differential [[id number]]  (Abnormal) Collected: 09/27/24 1655    Specimen: Blood from Arm, Right Updated: 09/27/24 1711    Narrative:      The following orders were created for panel order CBC & Differential.  Procedure                               Abnormality         Status                     ---------                               -----------         ------                     CBC Auto Differential[[id number]]        Abnormal            Final result                 Please view results for these tests on the individual orders.    Comprehensive Metabolic Panel [[id number]]  (Abnormal) Collected: 09/27/24 1655    Specimen: Blood from Arm, Right Updated: 09/27/24 1728     Glucose 147 mg/dL      BUN 48 mg/dL      Creatinine 5.36 mg/dL      Sodium 137 mmol/L      Potassium 3.8 mmol/L       Comment: Slight hemolysis detected by analyzer. Result may be falsely elevated.        Chloride 95 mmol/L      CO2 29.4 mmol/L      Calcium 9.7 mg/dL      Total Protein 7.6 g/dL      Albumin 4.2 g/dL      ALT (SGPT) 12 U/L      AST (SGOT) 15 U/L      Alkaline Phosphatase 95 U/L      Total Bilirubin 0.6 mg/dL      Globulin 3.4 gm/dL      A/G Ratio 1.2 g/dL      BUN/Creatinine Ratio 9.0     Anion Gap 12.6 mmol/L      eGFR 10.3 mL/min/1.73      Comment: <15 Indicative of kidney failure       Narrative:      GFR Normal >60  Chronic Kidney Disease <60  Kidney Failure <15    The GFR formula is only valid for adults with stable renal function between ages 18 and 70.    High Sensitivity Troponin T [[id number]]  (Abnormal) Collected: 09/27/24 1655    Specimen: Blood from Arm, Right Updated: 09/27/24 1743     HS Troponin T 73 ng/L     Narrative:      High Sensitive Troponin T Reference Range:  <14.0 ng/L- Negative Female for AMI  <22.0 ng/L- Negative Male for AMI  >=14 - Abnormal Female indicating possible myocardial injury.  >=22 - Abnormal Male indicating possible myocardial injury.   Clinicians would have to utilize clinical acumen, EKG, Troponin, and serial changes to determine if it is an Acute Myocardial Infarction or myocardial injury due to an underlying chronic condition.         Magnesium [[id number]]  (Normal) Collected: 09/27/24 1655    Specimen: Blood from Arm, Right Updated: 09/27/24 1728     Magnesium 1.6 mg/dL     CBC Auto Differential [[id number]]  (Abnormal) Collected: 09/27/24 1655    Specimen: Blood from Arm, Right Updated: 09/27/24 1711     WBC 7.60 10*3/mm3      RBC 3.53 10*6/mm3      Hemoglobin 10.7 g/dL      Hematocrit 33.7 %      MCV 95.5 fL      MCH 30.3 pg      MCHC 31.8 g/dL      RDW 15.4 %      RDW-SD 52.2 fl      MPV 11.3 fL      Platelets 111 10*3/mm3      Neutrophil % 61.9 %      Lymphocyte % 24.9 %      Monocyte % 10.3 %      Eosinophil % 2.0 %      Basophil % 0.4 %      Immature Grans % 0.5 %       Neutrophils, Absolute 4.71 10*3/mm3      Lymphocytes, Absolute 1.89 10*3/mm3      Monocytes, Absolute 0.78 10*3/mm3      Eosinophils, Absolute 0.15 10*3/mm3      Basophils, Absolute 0.03 10*3/mm3      Immature Grans, Absolute 0.04 10*3/mm3      nRBC 0.0 /100 WBC     COVID-19, FLU A/B, RSV PCR 1 HR TAT - Swab, Nasopharynx [[id number]]  (Normal) Collected: 09/27/24 1715    Specimen: Swab from Nasopharynx Updated: 09/27/24 1757     COVID19 Not Detected     Influenza A PCR Not Detected     Influenza B PCR Not Detected     RSV, PCR Not Detected    Narrative:      Fact sheet for providers: https://www.fda.gov/media/758766/download    Fact sheet for patients: https://www.fda.gov/media/890810/download    Test performed by PCR.    High Sensitivity Troponin T [[id number]]  (Abnormal) Collected: 09/28/24 0126    Specimen: Blood Updated: 09/28/24 0224     HS Troponin T 74 ng/L     Narrative:      High Sensitive Troponin T Reference Range:  <14.0 ng/L- Negative Female for AMI  <22.0 ng/L- Negative Male for AMI  >=14 - Abnormal Female indicating possible myocardial injury.  >=22 - Abnormal Male indicating possible myocardial injury.   Clinicians would have to utilize clinical acumen, EKG, Troponin, and serial changes to determine if it is an Acute Myocardial Infarction or myocardial injury due to an underlying chronic condition.         CBC & Differential [[id number]]  (Abnormal) Collected: 09/28/24 0126    Specimen: Blood Updated: 09/28/24 0151    Narrative:      The following orders were created for panel order CBC & Differential.  Procedure                               Abnormality         Status                     ---------                               -----------         ------                     CBC Auto Differential[[id number]]        Abnormal            Final result                 Please view results for these tests on the individual orders.    Comprehensive Metabolic Panel [[id number]]  (Abnormal) Collected: 09/28/24  0126    Specimen: Blood Updated: 09/28/24 0201     Glucose 140 mg/dL      BUN 59 mg/dL      Creatinine 5.95 mg/dL      Sodium 136 mmol/L      Potassium 4.2 mmol/L      Chloride 94 mmol/L      CO2 28.0 mmol/L      Calcium 9.8 mg/dL      Total Protein 7.8 g/dL      Albumin 4.1 g/dL      ALT (SGPT) 12 U/L      AST (SGOT) 14 U/L      Alkaline Phosphatase 79 U/L      Total Bilirubin 0.7 mg/dL      Globulin 3.7 gm/dL      A/G Ratio 1.1 g/dL      BUN/Creatinine Ratio 9.9     Anion Gap 14.0 mmol/L      eGFR 9.1 mL/min/1.73      Comment: <15 Indicative of kidney failure       Narrative:      GFR Normal >60  Chronic Kidney Disease <60  Kidney Failure <15    The GFR formula is only valid for adults with stable renal function between ages 18 and 70.    Lipase [[id number]]  (Normal) Collected: 09/28/24 0126    Specimen: Blood Updated: 09/28/24 0201     Lipase 34 U/L     BNP [[id number]]  (Abnormal) Collected: 09/28/24 0126    Specimen: Blood Updated: 09/28/24 0158     proBNP 3,555.0 pg/mL     Narrative:      This assay is used as an aid in the diagnosis of individuals suspected of having heart failure. It can be used as an aid in the diagnosis of acute decompensated heart failure (ADHF) in patients presenting with signs and symptoms of ADHF to the emergency department (ED). In addition, NT-proBNP of <300 pg/mL indicates ADHF is not likely.    Age Range Result Interpretation  NT-proBNP Concentration (pg/mL:      <50             Positive            >450                   Gray                 300-450                    Negative             <300    50-75           Positive            >900                  Gray                300-900                  Negative            <300      >75             Positive            >1800                  Gray                300-1800                  Negative            <300    Magnesium [[id number]]  (Abnormal) Collected: 09/28/24 0126    Specimen: Blood Updated: 09/28/24 0201     Magnesium 1.5 mg/dL      CBC Auto Differential [[id number]]  (Abnormal) Collected: 09/28/24 0126    Specimen: Blood Updated: 09/28/24 0151     WBC 7.67 10*3/mm3      RBC 3.56 10*6/mm3      Hemoglobin 10.7 g/dL      Hematocrit 34.0 %      MCV 95.5 fL      MCH 30.1 pg      MCHC 31.5 g/dL      RDW 15.3 %      RDW-SD 52.6 fl      MPV 11.4 fL      Platelets 113 10*3/mm3      Neutrophil % 69.1 %      Lymphocyte % 16.7 %      Monocyte % 12.1 %      Eosinophil % 0.9 %      Basophil % 0.3 %      Immature Grans % 0.9 %      Neutrophils, Absolute 5.30 10*3/mm3      Lymphocytes, Absolute 1.28 10*3/mm3      Monocytes, Absolute 0.93 10*3/mm3      Eosinophils, Absolute 0.07 10*3/mm3      Basophils, Absolute 0.02 10*3/mm3      Immature Grans, Absolute 0.07 10*3/mm3      nRBC 0.0 /100 WBC     CK [[id number]]  (Normal) Collected: 09/28/24 0126    Specimen: Blood Updated: 09/28/24 0212     Creatine Kinase 82 U/L     High Sensitivity Troponin T 2Hr [[id number]]  (Abnormal) Collected: 09/28/24 0400    Specimen: Blood from Hand, Right Updated: 09/28/24 0452     HS Troponin T 75 ng/L      Troponin T Delta 1 ng/L     Narrative:      High Sensitive Troponin T Reference Range:  <14.0 ng/L- Negative Female for AMI  <22.0 ng/L- Negative Male for AMI  >=14 - Abnormal Female indicating possible myocardial injury.  >=22 - Abnormal Male indicating possible myocardial injury.   Clinicians would have to utilize clinical acumen, EKG, Troponin, and serial changes to determine if it is an Acute Myocardial Infarction or myocardial injury due to an underlying chronic condition.         CBC Auto Differential [[id number]]  (Abnormal) Collected: 09/28/24 0400    Specimen: Blood from Hand, Right Updated: 09/28/24 0416     WBC 7.95 10*3/mm3      RBC 3.62 10*6/mm3      Hemoglobin 10.8 g/dL      Hematocrit 34.6 %      MCV 95.6 fL      MCH 29.8 pg      MCHC 31.2 g/dL      RDW 15.2 %      RDW-SD 52.8 fl      MPV 11.1 fL      Platelets 103 10*3/mm3      Neutrophil % 75.9 %      Lymphocyte  % 13.3 %      Monocyte % 9.8 %      Eosinophil % 0.3 %      Basophil % 0.1 %      Immature Grans % 0.6 %      Neutrophils, Absolute 6.03 10*3/mm3      Lymphocytes, Absolute 1.06 10*3/mm3      Monocytes, Absolute 0.78 10*3/mm3      Eosinophils, Absolute 0.02 10*3/mm3      Basophils, Absolute 0.01 10*3/mm3      Immature Grans, Absolute 0.05 10*3/mm3      nRBC 0.0 /100 WBC     Basic Metabolic Panel [[id number]]  (Abnormal) Collected: 09/28/24 0400    Specimen: Blood from Hand, Right Updated: 09/28/24 0435     Glucose 170 mg/dL      BUN 59 mg/dL      Creatinine 6.43 mg/dL      Sodium 133 mmol/L      Potassium 4.1 mmol/L      Chloride 94 mmol/L      CO2 26.3 mmol/L      Calcium 9.6 mg/dL      BUN/Creatinine Ratio 9.2     Anion Gap 12.7 mmol/L      eGFR 8.3 mL/min/1.73      Comment: <15 Indicative of kidney failure       Narrative:      GFR Normal >60  Chronic Kidney Disease <60  Kidney Failure <15    The GFR formula is only valid for adults with stable renal function between ages 18 and 70.             Imaging:    CT Abdomen Pelvis Without Contrast    Result Date: 9/28/2024  CT ABDOMEN PELVIS WO CONTRAST Date of Exam: 9/28/2024 1:43 AM EDT Indication: Abdominal and flank pain. Comparison: 4/8/2024 Technique: Axial CT images were obtained of the abdomen and pelvis without the administration of contrast. Reconstructed coronal and sagittal images were also obtained. Automated exposure control and iterative construction methods were used. Findings: Please see chest CT report dictated separately. There is atherosclerotic disease with no aortic aneurysm. Gallbladder is grossly normal. No biliary obstruction is seen. Previously seen peritoneal dialysis catheter has been removed. Again seen are bilateral renal cortical thinning and bilateral renal cysts. A large stone in the right renal pelvis measures about 12 mm in size and is unchanged in position. There is no associated hydronephrosis. No ureteral stones on either side. The  unenhanced solid  abdominal organs are otherwise normal. Urinary bladder and prostate gland appear normal. There is streak artifact in the pelvis from a right hip arthroplasty. The appendix is surgically absent. There is colonic diverticulosis without diverticulitis or colitis. No small bowel obstruction is identified. The stomach is normal. No free fluid or adenopathy. There is lumbar degenerative disease with chronic compression fractures at L3 and T12 as well as at T10.     1. No bowel obstruction or evidence of colitis. There is colonic diverticulosis without diverticulitis. Surgically absent appendix. 2. Large calculus is again identified in the right renal pelvis, not significantly changed. No hydronephrosis on either side. There is bilateral renal cortical thinning with bilateral renal cysts. 3. Interval removal of a peritoneal dialysis catheter. Electronically Signed: Cristian Tiwari MD  9/28/2024 2:29 AM EDT  Workstation ID: [id number]    CT Chest Without Contrast Diagnostic    Result Date: 9/28/2024  CT CHEST WO CONTRAST DIAGNOSTIC Date of Exam: 9/28/2024 1:43 AM EDT Indication: lower chest pain, unable to localize. Comparison: 8/23/2024 Technique: Axial CT images were obtained of the chest without contrast administration.  Reconstructed coronal and sagittal images were also obtained. Automated exposure control and iterative construction methods were used. Findings: Please see abdomen pelvis CT report dictated separately. There is marked bilateral gynecomastia. Heart is enlarged. There is atherosclerotic disease and coronary artery disease. Central venous catheter tip at the right atrial level. No pleural or pericardial effusion. No adenopathy allowing for the lack of contrast. Previously described 10 mm nodule in the left lower lobe has resolved, presumably infectious or inflammatory. There are subpleural reticular changes in the lungs, particularly the upper lobes compatible with a mild degree of fibrosis  as seen previously. There are some groundglass changes in the lower lobes, likely indicative of atelectasis rather than acute pneumonitis. No clear evidence of volume overload at this time. There is diffuse degenerative disease in the thoracic spine.     1. Interval resolution of previously described 10 mm left lower lobe nodule, presumed infectious or inflammatory. 2. Groundglass changes in the lower lobes, most likely secondary to some mild atelectasis. Pneumonitis is considered less likely. No dense consolidations. No clear evidence of volume overload. 3. Cardiomegaly with atherosclerotic disease and coronary artery disease. Electronically Signed: Cristian Tiwari MD  9/28/2024 2:17 AM EDT  Workstation ID: [id number]    XR Chest 1 View    Result Date: 9/28/2024  XR CHEST 1 VW Date of Exam: 9/28/2024 1:29 AM EDT Indication: Chest Pain Triage Protocol Comparison: 9/27/2024 at 1725 hours Findings: Heart remains enlarged. Dialysis catheter tip is near the right atrial level. There is chronic elevation of the right hemidiaphragm. The lungs are clear. Pulmonary vascularity is normal. No pneumothorax is identified.     No acute cardiopulmonary findings. Electronically Signed: Cristian Tiwari MD  9/28/2024 1:33 AM EDT  Workstation ID: [id number]    XR Chest 1 View    Result Date: 9/27/2024  XR CHEST 1 VW Date of Exam: 9/27/2024 5:20 PM EDT Indication: Chest pain protocol Chest Pain Protocol Comparison: Chest radiograph 9/16/2024, chest CT 8/23/2024. Findings: Unchanged position of right chest port tip. Mildly enlarged cardiac silhouette, unchanged. Thoracic aortic calcifications. Unchanged right hemidiaphragm elevation. No focal consolidation. No pleural effusion or pneumothorax. Osseous structures are unchanged.     Impression: No acute cardiopulmonary findings. Electronically Signed: Claus Sánchez MD  9/27/2024 5:44 PM EDT  Workstation ID: [id number]       Differential Diagnosis and Discussion:    Abdominal Pain: Based on the  patient's signs and symptoms, I considered abdominal aortic aneurysm, small bowel obstruction, pancreatitis, acute cholecystitis, acute appendecitis, peptic ulcer disease, gastritis, colitis, endocrine disorders, irritable bowel syndrome and other differential diagnosis an etiology of the patient's abdominal pain.    All labs were reviewed and interpreted by me.  All X-rays impressions were independently interpreted by me.  EKG was interpreted by me.  CT scan radiology impression was interpreted by me.    MDM     Amount and/or Complexity of Data Reviewed  Decide to obtain previous medical records or to obtain history from someone other than the patient: yes                 Patient Care Considerations:    ANTIBIOTICS: I considered prescribing antibiotics as an outpatient however no bacterial focus of infection was found.      Consultants/Shared Management Plan:    Hospitalist: I have discussed the case with the hospitalist who agrees to accept the patient for admission.    Social Determinants of Health:    Patient has presented with family members who are responsible, reliable and will ensure follow up care.      Disposition and Care Coordination:    Admit:   Through independent evaluation of the patient's history, physical, and imperical data, the patient meets criteria for inpatient admission to the hospital.        Final diagnoses:   ESRD on dialysis   Intractable pain        ED Disposition       ED Disposition   Decision to Admit    Condition   --    Comment   Level of Care: Remote Telemetry [26]   Diagnosis: Intractable pain [715450]                 This medical record created using voice recognition software.             Lorne Sánchez MD  09/28/24 [id number]

## 2024-09-28 NOTE — PLAN OF CARE
Goal Outcome Evaluation:  Plan of Care Reviewed With: patient           Outcome Evaluation: Pt presents with decreased ambulatory and transfer function due to weakness and diminished endurance. Pt will benefit from skilled physical therapy to address deficits and improve safety.      Anticipated Discharge Disposition (PT): inpatient rehabilitation facility

## 2024-09-28 NOTE — CONSULTS
Nephrology consult note    Galileo Cook MD     Reason for consult: End-stage renal disease    HPI: 79-year-old gentleman has a history of diabetes mellitus, history of hypertension, history of ESRD previously was on peritoneal dialysis but due to fungal peritonitis was switched over to hemodialysis through a tunneled catheter earlier this year.  According to the records, patient is now getting home hemodialysis every  and Friday and according to his wife at bedside although she states she cannot speak well and very difficult getting a history from her and he apparently just received IV medications and cannot get much from him but according to the patient's wife he had dialysis yesterday.  Patient presented here with severe abdominal discomfort and weakness.  Patient had a negative chest x-ray and essentially nonacute CT of the abdomen pelvis as well.  Patient currently is not in any distress but again received some IV pain medications according to his wife.      Past Medical History:   Diagnosis Date    Abnormal stress test     Anemia     IRON INFUSIONS WITH DIALYSIS    Anesthesia     WITH HIP REPLACEMENT DAUGHTER BELIEVES HAD SVT     Ankle pain, right     Anxiety and depression     CKD (chronic kidney disease), stage IV     DIALYSIS [id number] SHILEY IN RIGHT CHEST    Diabetes     Gout     High cholesterol     History of peritoneal dialysis     HL (hearing loss)     Hyperkalemia     Hypertension     Hypothyroidism     Insomnia     Night terrors     Psoriasis     Psoriasis     Sleep walking     Thrombocytopenia     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    Vitiligo      Social History     Socioeconomic History    Marital status:    Tobacco Use    Smoking status: Former     Current packs/day: 0.00     Average packs/day: 1 pack/day for 10.0 years (10.0 ttl pk-yrs)     Types: Cigarettes     Start date:      Quit date: 2000     Years since quittin.7     Passive  exposure: Past    Smokeless tobacco: Never    Tobacco comments:     quit 25 years ago, chews nicotine gum in past   Vaping Use    Vaping status: Never Used   Substance and Sexual Activity    Alcohol use: Yes     Comment: 1 DRINK/DAY-rarely    Drug use: Never    Sexual activity: Defer     Current Facility-Administered Medications on File Prior to Encounter   Medication Dose Route Frequency Provider Last Rate Last Admin    [COMPLETED] aspirin chewable tablet 324 mg  324 mg Oral Once Liliam Delgado APRN   324 mg at 09/27/24 1718    [DISCONTINUED] sodium chloride 0.9 % flush 10 mL  10 mL Intravenous PRN Liliam Delgado APRN         Current Outpatient Medications on File Prior to Encounter   Medication Sig Dispense Refill    allopurinol (ZYLOPRIM) 100 MG tablet Take 1 tablet by mouth Daily. As needed      ARIPiprazole (ABILIFY) 2 MG tablet Take 1 tablet by mouth 2 (Two) Times a Day. 60 tablet 0    atorvastatin (LIPITOR) 40 MG tablet Take 1 tablet by mouth Daily. 90 tablet 1    carvedilol (COREG) 25 MG tablet Take 1 tablet by mouth 2 (Two) Times a Day With Meals.      cloNIDine (CATAPRES) 0.2 MG tablet Take 1 tablet by mouth Every 8 (Eight) Hours As Needed for High Blood Pressure.      clopidogrel (PLAVIX) 75 MG tablet  (Patient not taking: Reported on 9/26/2024)      Continuous Glucose  (Dexcom G7 ) device Use 1 Units Continuous. (Patient not taking: Reported on 8/14/2024) 1 each 12    Continuous Glucose Sensor (Dexcom G7 Sensor) misc Use 1 Units Continuous. (Patient not taking: Reported on 8/14/2024) 1 each 12    Insulin Glargine (LANTUS SOLOSTAR) 100 UNIT/ML injection pen Inject 10 Units under the skin into the appropriate area as directed Every Night.      Levothyroxine Sodium 175 MCG capsule Take 175 mcg by mouth Daily. 30 capsule 0    lidocaine-prilocaine (EMLA) 2.5-2.5 % cream APPLY SMALL AMOUNT TO ACCESS SITE (AVF) 1 HOUR BEFORE DIALYSIS. COVER WITH OCCLUSIVE DRESSING (SARAN WRAP)      Farshad  "10 g packet Take 10 g by mouth Every Other Day. TAKES MON, WED, FRI, SUN      Methoxy PEG-Epoetin Beta (MIRCERA IJ) 30 mcg Every 14 (Fourteen) Days. RECEIVES DURING DIALYSIS      midodrine (PROAMATINE) 2.5 MG tablet Take 1 tablet by mouth 3 (Three) Times a Day Before Meals. On dialysis days 90 tablet 3    pantoprazole (PROTONIX) 40 MG EC tablet Take 1 tablet by mouth Daily. 60 tablet 3    sertraline (ZOLOFT) 100 MG tablet Take 1 tablet by mouth Every Night. 90 tablet 2    sevelamer (RENVELA) 800 MG tablet Take 1 tablet by mouth 2 (Two) Times a Day. 90 tablet 1    torsemide (DEMADEX) 100 MG tablet Take 1 tablet by mouth Daily. 90 tablet 2    Turmeric (QC TUMERIC COMPLEX PO) Take 2 capsules by mouth 2 (Two) Times a Day.        ARIPiprazole, 2 mg, Oral, BID  aspirin, 324 mg, Oral, Once  atorvastatin, 40 mg, Oral, Daily  carvedilol, 25 mg, Oral, BID With Meals  insulin lispro, 2-9 Units, Subcutaneous, 4x Daily AC & at Bedtime  levothyroxine, 175 mcg, Oral, Q AM  pantoprazole, 40 mg, Oral, Daily  sertraline, 100 mg, Oral, Nightly  sevelamer, 800 mg, Oral, BID  sodium chloride, 10 mL, Intravenous, Q12H  torsemide, 100 mg, Oral, Daily         acetaminophen **OR** acetaminophen **OR** acetaminophen    cloNIDine    dextrose    dextrose    glucagon (human recombinant)    [Held by provider] HYDROmorphone    prochlorperazine    sodium chloride    sodium chloride    sodium chloride   Family History   Problem Relation Age of Onset    Diabetes Mother     Heart disease Father     Colon cancer Sister 77    Cancer Sister 35    Diabetes Sister     Diabetes Brother     Sleep apnea Paternal Aunt     Heart disease Paternal Uncle     Sleep apnea Daughter     Malig Hyperthermia Neg Hx        Review of Systems: As mentioned above otherwise unable to get a good history    Physical Exam:  /82 (BP Location: Left arm, Patient Position: Lying)   Pulse 75   Temp 99 °F (37.2 °C) (Oral)   Resp 18   Ht 167.6 cm (66\")   Wt 77.2 kg (170 lb " 3.1 oz)   SpO2 97%   BMI 27.47 kg/m²     Intake/Output Summary (Last 24 hours) at 9/28/2024 1354  Last data filed at 9/28/2024 0330  Gross per 24 hour   Intake 240 ml   Output --   Net 240 ml      General: Patient alert , no acute distress  HEENT: Normocephalic atraumatic pupils are equal round reactive to light extraocular muscles are intact there appears to be normal mouth is clear no erythema no exudates   Chest: Tunneled catheter is noted in the upper chest without drainage  Cardiac: Regular rate and rhythm without a rub no S3 or S4  Lungs: Clear bilaterally no wheezes rhonchi or rales  Abdomen: Bowel sounds positive nontender soft, patient does have some scarring from previous peritoneal dialysis catheters  Extremities: No edema  or cyanosis  Skin: No acute rashes noted  : Deferred      Lab Results (last 24 hours)       Procedure Component Value Units Date/Time    BNP [[id number]]  (Abnormal) Collected: 09/28/24 0400    Specimen: Blood from Hand, Right Updated: 09/28/24 1231     proBNP 3,698.0 pg/mL     Narrative:      This assay is used as an aid in the diagnosis of individuals suspected of having heart failure. It can be used as an aid in the diagnosis of acute decompensated heart failure (ADHF) in patients presenting with signs and symptoms of ADHF to the emergency department (ED). In addition, NT-proBNP of <300 pg/mL indicates ADHF is not likely.    Age Range Result Interpretation  NT-proBNP Concentration (pg/mL:      <50             Positive            >450                   Gray                 300-450                    Negative             <300    50-75           Positive            >900                  Gray                300-900                  Negative            <300      >75             Positive            >1800                  Gray                300-1800                  Negative            <300    C-reactive Protein [[id number]]  (Abnormal) Collected: 09/28/24 0400    Specimen: Blood from  Hand, Right Updated: 09/28/24 1226     C-Reactive Protein 4.44 mg/dL     POC Glucose Once [[id number]]  (Abnormal) Collected: 09/28/24 1224    Specimen: Blood Updated: 09/28/24 1225     Glucose 130 mg/dL      Comment: Serial Number: [id number]:  [id number]       Sedimentation Rate [[id number]]  (Abnormal) Collected: 09/28/24 0400    Specimen: Blood from Hand, Right Updated: 09/28/24 1219     Sed Rate 75 mm/hr     Lactic Acid, Plasma [[id number]]  (Normal) Collected: 09/28/24 1042    Specimen: Blood from Hand, Right Updated: 09/28/24 1106     Lactate 1.6 mmol/L     High Sensitivity Troponin T 2Hr [[id number]]  (Abnormal) Collected: 09/28/24 0400    Specimen: Blood from Hand, Right Updated: 09/28/24 0452     HS Troponin T 75 ng/L      Troponin T Delta 1 ng/L     Narrative:      High Sensitive Troponin T Reference Range:  <14.0 ng/L- Negative Female for AMI  <22.0 ng/L- Negative Male for AMI  >=14 - Abnormal Female indicating possible myocardial injury.  >=22 - Abnormal Male indicating possible myocardial injury.   Clinicians would have to utilize clinical acumen, EKG, Troponin, and serial changes to determine if it is an Acute Myocardial Infarction or myocardial injury due to an underlying chronic condition.         Basic Metabolic Panel [[id number]]  (Abnormal) Collected: 09/28/24 0400    Specimen: Blood from Hand, Right Updated: 09/28/24 0435     Glucose 170 mg/dL      BUN 59 mg/dL      Creatinine 6.43 mg/dL      Sodium 133 mmol/L      Potassium 4.1 mmol/L      Chloride 94 mmol/L      CO2 26.3 mmol/L      Calcium 9.6 mg/dL      BUN/Creatinine Ratio 9.2     Anion Gap 12.7 mmol/L      eGFR 8.3 mL/min/1.73      Comment: <15 Indicative of kidney failure       Narrative:      GFR Normal >60  Chronic Kidney Disease <60  Kidney Failure <15    The GFR formula is only valid for adults with stable renal function between ages 18 and 70.    CBC Auto Differential [[id number]]  (Abnormal) Collected: 09/28/24 0400    Specimen:  Blood from Hand, Right Updated: 09/28/24 0416     WBC 7.95 10*3/mm3      RBC 3.62 10*6/mm3      Hemoglobin 10.8 g/dL      Hematocrit 34.6 %      MCV 95.6 fL      MCH 29.8 pg      MCHC 31.2 g/dL      RDW 15.2 %      RDW-SD 52.8 fl      MPV 11.1 fL      Platelets 103 10*3/mm3      Neutrophil % 75.9 %      Lymphocyte % 13.3 %      Monocyte % 9.8 %      Eosinophil % 0.3 %      Basophil % 0.1 %      Immature Grans % 0.6 %      Neutrophils, Absolute 6.03 10*3/mm3      Lymphocytes, Absolute 1.06 10*3/mm3      Monocytes, Absolute 0.78 10*3/mm3      Eosinophils, Absolute 0.02 10*3/mm3      Basophils, Absolute 0.01 10*3/mm3      Immature Grans, Absolute 0.05 10*3/mm3      nRBC 0.0 /100 WBC     High Sensitivity Troponin T [[id number]]  (Abnormal) Collected: 09/28/24 0126    Specimen: Blood Updated: 09/28/24 0224     HS Troponin T 74 ng/L     Narrative:      High Sensitive Troponin T Reference Range:  <14.0 ng/L- Negative Female for AMI  <22.0 ng/L- Negative Male for AMI  >=14 - Abnormal Female indicating possible myocardial injury.  >=22 - Abnormal Male indicating possible myocardial injury.   Clinicians would have to utilize clinical acumen, EKG, Troponin, and serial changes to determine if it is an Acute Myocardial Infarction or myocardial injury due to an underlying chronic condition.         CK [[id number]]  (Normal) Collected: 09/28/24 0126    Specimen: Blood Updated: 09/28/24 0212     Creatine Kinase 82 U/L     Comprehensive Metabolic Panel [[id number]]  (Abnormal) Collected: 09/28/24 0126    Specimen: Blood Updated: 09/28/24 0201     Glucose 140 mg/dL      BUN 59 mg/dL      Creatinine 5.95 mg/dL      Sodium 136 mmol/L      Potassium 4.2 mmol/L      Chloride 94 mmol/L      CO2 28.0 mmol/L      Calcium 9.8 mg/dL      Total Protein 7.8 g/dL      Albumin 4.1 g/dL      ALT (SGPT) 12 U/L      AST (SGOT) 14 U/L      Alkaline Phosphatase 79 U/L      Total Bilirubin 0.7 mg/dL      Globulin 3.7 gm/dL      A/G Ratio 1.1 g/dL       BUN/Creatinine Ratio 9.9     Anion Gap 14.0 mmol/L      eGFR 9.1 mL/min/1.73      Comment: <15 Indicative of kidney failure       Narrative:      GFR Normal >60  Chronic Kidney Disease <60  Kidney Failure <15    The GFR formula is only valid for adults with stable renal function between ages 18 and 70.    Lipase [[id number]]  (Normal) Collected: 09/28/24 0126    Specimen: Blood Updated: 09/28/24 0201     Lipase 34 U/L     Magnesium [[id number]]  (Abnormal) Collected: 09/28/24 0126    Specimen: Blood Updated: 09/28/24 0201     Magnesium 1.5 mg/dL     BNP [[id number]]  (Abnormal) Collected: 09/28/24 0126    Specimen: Blood Updated: 09/28/24 0158     proBNP 3,555.0 pg/mL     Narrative:      This assay is used as an aid in the diagnosis of individuals suspected of having heart failure. It can be used as an aid in the diagnosis of acute decompensated heart failure (ADHF) in patients presenting with signs and symptoms of ADHF to the emergency department (ED). In addition, NT-proBNP of <300 pg/mL indicates ADHF is not likely.    Age Range Result Interpretation  NT-proBNP Concentration (pg/mL:      <50             Positive            >450                   Gray                 300-450                    Negative             <300    50-75           Positive            >900                  Gray                300-900                  Negative            <300      >75             Positive            >1800                  Gray                300-1800                  Negative            <300    CBC & Differential [[id number]]  (Abnormal) Collected: 09/28/24 0126    Specimen: Blood Updated: 09/28/24 0151    Narrative:      The following orders were created for panel order CBC & Differential.  Procedure                               Abnormality         Status                     ---------                               -----------         ------                     CBC Auto Differential[[id number]]        Abnormal            Final  result                 Please view results for these tests on the individual orders.    CBC Auto Differential [[id number]]  (Abnormal) Collected: 09/28/24 0126    Specimen: Blood Updated: 09/28/24 0151     WBC 7.67 10*3/mm3      RBC 3.56 10*6/mm3      Hemoglobin 10.7 g/dL      Hematocrit 34.0 %      MCV 95.5 fL      MCH 30.1 pg      MCHC 31.5 g/dL      RDW 15.3 %      RDW-SD 52.6 fl      MPV 11.4 fL      Platelets 113 10*3/mm3      Neutrophil % 69.1 %      Lymphocyte % 16.7 %      Monocyte % 12.1 %      Eosinophil % 0.9 %      Basophil % 0.3 %      Immature Grans % 0.9 %      Neutrophils, Absolute 5.30 10*3/mm3      Lymphocytes, Absolute 1.28 10*3/mm3      Monocytes, Absolute 0.93 10*3/mm3      Eosinophils, Absolute 0.07 10*3/mm3      Basophils, Absolute 0.02 10*3/mm3      Immature Grans, Absolute 0.07 10*3/mm3      nRBC 0.0 /100 WBC     [place] Draw [[id number]] Collected: 09/28/24 0126    Specimen: Blood Updated: 09/28/24 0146    Narrative:      The following orders were created for panel order [place] Draw.  Procedure                               Abnormality         Status                     ---------                               -----------         ------                     Green Top (Gel)[[id number]]                                  Final result               Lavender Top[[id number]]                                     Final result               Gold Top - SST[[id number]]                                   Final result               Light Blue Top[[id number]]                                   Final result                 Please view results for these tests on the individual orders.    Green Top (Gel) [[id number]] Collected: 09/28/24 0126    Specimen: Blood Updated: 09/28/24 0146     Extra Tube Hold for add-ons.     Comment: Auto resulted.       Lavender Top [[id number]] Collected: 09/28/24 0126    Specimen: Blood Updated: 09/28/24 0146     Extra Tube hold for add-on     Comment: Auto resulted       Gold Top - SST  [[id number]] Collected: 09/28/24 0126    Specimen: Blood Updated: 09/28/24 0146     Extra Tube Hold for add-ons.     Comment: Auto resulted.       Light Blue Top [[id number]] Collected: 09/28/24 0126    Specimen: Blood Updated: 09/28/24 0146     Extra Tube Hold for add-ons.     Comment: Auto resulted                Imaging Results (Last 24 Hours)       Procedure Component Value Units Date/Time    CT Abdomen Pelvis Without Contrast [[id number]] Collected: 09/28/24 0217     Updated: 09/28/24 0231    Narrative:      CT ABDOMEN PELVIS WO CONTRAST    Date of Exam: 9/28/2024 1:43 AM EDT    Indication: Abdominal and flank pain.    Comparison: 4/8/2024    Technique: Axial CT images were obtained of the abdomen and pelvis without the administration of contrast. Reconstructed coronal and sagittal images were also obtained. Automated exposure control and iterative construction methods were used.    Findings:  Please see chest CT report dictated separately. There is atherosclerotic disease with no aortic aneurysm. Gallbladder is grossly normal. No biliary obstruction is seen. Previously seen peritoneal dialysis catheter has been removed. Again seen are   bilateral renal cortical thinning and bilateral renal cysts. A large stone in the right renal pelvis measures about 12 mm in size and is unchanged in position. There is no associated hydronephrosis. No ureteral stones on either side. The unenhanced solid   abdominal organs are otherwise normal. Urinary bladder and prostate gland appear normal.    There is streak artifact in the pelvis from a right hip arthroplasty. The appendix is surgically absent. There is colonic diverticulosis without diverticulitis or colitis. No small bowel obstruction is identified. The stomach is normal. No free fluid or   adenopathy. There is lumbar degenerative disease with chronic compression fractures at L3 and T12 as well as at T10.      Impression:        1. No bowel obstruction or evidence of  colitis. There is colonic diverticulosis without diverticulitis. Surgically absent appendix.  2. Large calculus is again identified in the right renal pelvis, not significantly changed. No hydronephrosis on either side. There is bilateral renal cortical thinning with bilateral renal cysts.  3. Interval removal of a peritoneal dialysis catheter.            Electronically Signed: Cristian Tiwari MD    9/28/2024 2:29 AM EDT    Workstation ID: [id number]    CT Chest Without Contrast Diagnostic [[id number]] Collected: 09/28/24 0210     Updated: 09/28/24 0219    Narrative:      CT CHEST WO CONTRAST DIAGNOSTIC    Date of Exam: 9/28/2024 1:43 AM EDT    Indication: lower chest pain, unable to localize.    Comparison: 8/23/2024    Technique: Axial CT images were obtained of the chest without contrast administration.  Reconstructed coronal and sagittal images were also obtained. Automated exposure control and iterative construction methods were used.    Findings:  Please see abdomen pelvis CT report dictated separately. There is marked bilateral gynecomastia. Heart is enlarged. There is atherosclerotic disease and coronary artery disease. Central venous catheter tip at the right atrial level. No pleural or   pericardial effusion. No adenopathy allowing for the lack of contrast.    Previously described 10 mm nodule in the left lower lobe has resolved, presumably infectious or inflammatory. There are subpleural reticular changes in the lungs, particularly the upper lobes compatible with a mild degree of fibrosis as seen previously.   There are some groundglass changes in the lower lobes, likely indicative of atelectasis rather than acute pneumonitis. No clear evidence of volume overload at this time. There is diffuse degenerative disease in the thoracic spine.      Impression:        1. Interval resolution of previously described 10 mm left lower lobe nodule, presumed infectious or inflammatory.  2. Groundglass changes in the lower  lobes, most likely secondary to some mild atelectasis. Pneumonitis is considered less likely. No dense consolidations. No clear evidence of volume overload.  3. Cardiomegaly with atherosclerotic disease and coronary artery disease.        Electronically Signed: Cristian Tiwari MD    9/28/2024 2:17 AM EDT    Workstation ID: [id number]    XR Chest 1 View [[id number]] Collected: 09/28/24 0131     Updated: 09/28/24 0135    Narrative:      XR CHEST 1 VW    Date of Exam: 9/28/2024 1:29 AM EDT    Indication: Chest Pain Triage Protocol    Comparison: 9/27/2024 at 1725 hours    Findings:  Heart remains enlarged. Dialysis catheter tip is near the right atrial level. There is chronic elevation of the right hemidiaphragm. The lungs are clear. Pulmonary vascularity is normal. No pneumothorax is identified.      Impression:      No acute cardiopulmonary findings.      Electronically Signed: Cristian Tiwari MD    9/28/2024 1:33 AM EDT    Workstation ID: [id number]             Assessment/plan:    1.  ESRD: Patient does not appear to be volume overloaded on exam and electrolytes are overall stable.  Will reevaluate in a.m. check blood volume status and plan on dialyzing here on Monday unless clinically things change.    2.  Anemia: Hemoglobin stable goals greater than 10 we will follow trends    3.  History of diabetes mellitus: Treatment per primary    4.  Abdominal pain/chest pain: Further workup and treatment per primary    5.  Hypertension: Home blood pressure medicine restarted will follow trends and adjust as clinically indicated will start as needed clonidine for increased blood pressure as needed    6.  History of hypothyroidism on Synthroid    Thank you very much, will continue to follow        Galileo Cook MD

## 2024-09-29 ENCOUNTER — APPOINTMENT (OUTPATIENT)
Dept: MRI IMAGING | Facility: HOSPITAL | Age: 79
DRG: 391 | End: 2024-09-29
Payer: MEDICARE

## 2024-09-29 PROBLEM — R10.9 ABDOMINAL PAIN: Status: ACTIVE | Noted: 2024-09-29

## 2024-09-29 LAB
ANION GAP SERPL CALCULATED.3IONS-SCNC: 18.4 MMOL/L (ref 5–15)
BASOPHILS # BLD AUTO: 0.03 10*3/MM3 (ref 0–0.2)
BASOPHILS NFR BLD AUTO: 0.4 % (ref 0–1.5)
BH CV ECHO MEAS - AO MAX PG: 14.7 MMHG
BH CV ECHO MEAS - AO MEAN PG: 8 MMHG
BH CV ECHO MEAS - AO ROOT DIAM: 3.6 CM
BH CV ECHO MEAS - AO V2 MAX: 192 CM/SEC
BH CV ECHO MEAS - AO V2 VTI: 41.4 CM
BH CV ECHO MEAS - AVA(I,D): 1.87 CM2
BH CV ECHO MEAS - EDV(CUBED): 91.1 ML
BH CV ECHO MEAS - EDV(MOD-SP2): 69.1 ML
BH CV ECHO MEAS - EDV(MOD-SP4): 51.2 ML
BH CV ECHO MEAS - EF(MOD-BP): 58.3 %
BH CV ECHO MEAS - EF(MOD-SP2): 61.4 %
BH CV ECHO MEAS - EF(MOD-SP4): 56.4 %
BH CV ECHO MEAS - ESV(CUBED): 15.6 ML
BH CV ECHO MEAS - ESV(MOD-SP2): 26.7 ML
BH CV ECHO MEAS - ESV(MOD-SP4): 22.3 ML
BH CV ECHO MEAS - FS: 44.4 %
BH CV ECHO MEAS - IVS/LVPW: 0.82 CM
BH CV ECHO MEAS - IVSD: 0.9 CM
BH CV ECHO MEAS - LA DIMENSION: 3.3 CM
BH CV ECHO MEAS - LAT PEAK E' VEL: 12.6 CM/SEC
BH CV ECHO MEAS - LV DIASTOLIC VOL/BSA (35-75): 27.4 CM2
BH CV ECHO MEAS - LV MASS(C)D: 153.3 GRAMS
BH CV ECHO MEAS - LV MAX PG: 5.4 MMHG
BH CV ECHO MEAS - LV MEAN PG: 3 MMHG
BH CV ECHO MEAS - LV SYSTOLIC VOL/BSA (12-30): 11.9 CM2
BH CV ECHO MEAS - LV V1 MAX: 116 CM/SEC
BH CV ECHO MEAS - LV V1 VTI: 24.7 CM
BH CV ECHO MEAS - LVIDD: 4.5 CM
BH CV ECHO MEAS - LVIDS: 2.5 CM
BH CV ECHO MEAS - LVOT AREA: 3.1 CM2
BH CV ECHO MEAS - LVOT DIAM: 2 CM
BH CV ECHO MEAS - LVPWD: 1.1 CM
BH CV ECHO MEAS - MED PEAK E' VEL: 8.3 CM/SEC
BH CV ECHO MEAS - MV A MAX VEL: 108 CM/SEC
BH CV ECHO MEAS - MV DEC SLOPE: 727 CM/SEC2
BH CV ECHO MEAS - MV E MAX VEL: 120 CM/SEC
BH CV ECHO MEAS - MV E/A: 1.11
BH CV ECHO MEAS - MV MEAN PG: 2 MMHG
BH CV ECHO MEAS - MV P1/2T: 47.9 MSEC
BH CV ECHO MEAS - MV V2 VTI: 34.6 CM
BH CV ECHO MEAS - MVA(P1/2T): 4.6 CM2
BH CV ECHO MEAS - MVA(VTI): 2.24 CM2
BH CV ECHO MEAS - RVDD: 3.1 CM
BH CV ECHO MEAS - SV(LVOT): 77.6 ML
BH CV ECHO MEAS - SV(MOD-SP2): 42.4 ML
BH CV ECHO MEAS - SV(MOD-SP4): 28.9 ML
BH CV ECHO MEAS - SVI(LVOT): 41.6 ML/M2
BH CV ECHO MEAS - SVI(MOD-SP2): 22.7 ML/M2
BH CV ECHO MEAS - SVI(MOD-SP4): 15.5 ML/M2
BH CV ECHO MEAS - TAPSE (>1.6): 2.09 CM
BH CV ECHO MEASUREMENTS AVERAGE E/E' RATIO: 11.48
BUN SERPL-MCNC: 76 MG/DL (ref 8–23)
BUN/CREAT SERPL: 9.2 (ref 7–25)
CALCIUM SPEC-SCNC: 9.9 MG/DL (ref 8.6–10.5)
CHLORIDE SERPL-SCNC: 90 MMOL/L (ref 98–107)
CO2 SERPL-SCNC: 22.6 MMOL/L (ref 22–29)
CREAT SERPL-MCNC: 8.25 MG/DL (ref 0.76–1.27)
DEPRECATED RDW RBC AUTO: 54 FL (ref 37–54)
EGFRCR SERPLBLD CKD-EPI 2021: 6.1 ML/MIN/1.73
EOSINOPHIL # BLD AUTO: 0.05 10*3/MM3 (ref 0–0.4)
EOSINOPHIL NFR BLD AUTO: 0.7 % (ref 0.3–6.2)
ERYTHROCYTE [DISTWIDTH] IN BLOOD BY AUTOMATED COUNT: 15.2 % (ref 12.3–15.4)
GLUCOSE BLDC GLUCOMTR-MCNC: 116 MG/DL (ref 70–99)
GLUCOSE BLDC GLUCOMTR-MCNC: 124 MG/DL (ref 70–99)
GLUCOSE BLDC GLUCOMTR-MCNC: 149 MG/DL (ref 70–99)
GLUCOSE BLDC GLUCOMTR-MCNC: 169 MG/DL (ref 70–99)
GLUCOSE SERPL-MCNC: 139 MG/DL (ref 65–99)
HCT VFR BLD AUTO: 31.7 % (ref 37.5–51)
HGB BLD-MCNC: 9.8 G/DL (ref 13–17.7)
IMM GRANULOCYTES # BLD AUTO: 0.04 10*3/MM3 (ref 0–0.05)
IMM GRANULOCYTES NFR BLD AUTO: 0.5 % (ref 0–0.5)
IVRT: 53 MS
LEFT ATRIUM VOLUME INDEX: 14 ML/M2
LYMPHOCYTES # BLD AUTO: 1.51 10*3/MM3 (ref 0.7–3.1)
LYMPHOCYTES NFR BLD AUTO: 20.2 % (ref 19.6–45.3)
MAGNESIUM SERPL-MCNC: 2.1 MG/DL (ref 1.6–2.4)
MCH RBC QN AUTO: 30.2 PG (ref 26.6–33)
MCHC RBC AUTO-ENTMCNC: 30.9 G/DL (ref 31.5–35.7)
MCV RBC AUTO: 97.5 FL (ref 79–97)
MONOCYTES # BLD AUTO: 0.9 10*3/MM3 (ref 0.1–0.9)
MONOCYTES NFR BLD AUTO: 12 % (ref 5–12)
NEUTROPHILS NFR BLD AUTO: 4.96 10*3/MM3 (ref 1.7–7)
NEUTROPHILS NFR BLD AUTO: 66.2 % (ref 42.7–76)
NRBC BLD AUTO-RTO: 0 /100 WBC (ref 0–0.2)
PHOSPHATE SERPL-MCNC: 6.1 MG/DL (ref 2.5–4.5)
PLATELET # BLD AUTO: 100 10*3/MM3 (ref 140–450)
PMV BLD AUTO: 11 FL (ref 6–12)
POTASSIUM SERPL-SCNC: 4.6 MMOL/L (ref 3.5–5.2)
RBC # BLD AUTO: 3.25 10*6/MM3 (ref 4.14–5.8)
SODIUM SERPL-SCNC: 131 MMOL/L (ref 136–145)
WBC NRBC COR # BLD AUTO: 7.49 10*3/MM3 (ref 3.4–10.8)

## 2024-09-29 PROCEDURE — 82948 REAGENT STRIP/BLOOD GLUCOSE: CPT

## 2024-09-29 PROCEDURE — 84100 ASSAY OF PHOSPHORUS: CPT | Performed by: STUDENT IN AN ORGANIZED HEALTH CARE EDUCATION/TRAINING PROGRAM

## 2024-09-29 PROCEDURE — 94761 N-INVAS EAR/PLS OXIMETRY MLT: CPT

## 2024-09-29 PROCEDURE — 72148 MRI LUMBAR SPINE W/O DYE: CPT

## 2024-09-29 PROCEDURE — 99232 SBSQ HOSP IP/OBS MODERATE 35: CPT | Performed by: STUDENT IN AN ORGANIZED HEALTH CARE EDUCATION/TRAINING PROGRAM

## 2024-09-29 PROCEDURE — 82948 REAGENT STRIP/BLOOD GLUCOSE: CPT | Performed by: STUDENT IN AN ORGANIZED HEALTH CARE EDUCATION/TRAINING PROGRAM

## 2024-09-29 PROCEDURE — 72146 MRI CHEST SPINE W/O DYE: CPT

## 2024-09-29 PROCEDURE — 94799 UNLISTED PULMONARY SVC/PX: CPT

## 2024-09-29 PROCEDURE — 83735 ASSAY OF MAGNESIUM: CPT | Performed by: STUDENT IN AN ORGANIZED HEALTH CARE EDUCATION/TRAINING PROGRAM

## 2024-09-29 PROCEDURE — 63710000001 INSULIN LISPRO (HUMAN) PER 5 UNITS: Performed by: STUDENT IN AN ORGANIZED HEALTH CARE EDUCATION/TRAINING PROGRAM

## 2024-09-29 PROCEDURE — 0VTTXZZ RESECTION OF PREPUCE, EXTERNAL APPROACH: ICD-10-PCS | Performed by: INTERNAL MEDICINE

## 2024-09-29 PROCEDURE — 85025 COMPLETE CBC W/AUTO DIFF WBC: CPT | Performed by: STUDENT IN AN ORGANIZED HEALTH CARE EDUCATION/TRAINING PROGRAM

## 2024-09-29 PROCEDURE — 80048 BASIC METABOLIC PNL TOTAL CA: CPT | Performed by: STUDENT IN AN ORGANIZED HEALTH CARE EDUCATION/TRAINING PROGRAM

## 2024-09-29 RX ORDER — SEVELAMER CARBONATE 800 MG/1
1600 TABLET, FILM COATED ORAL
Status: DISCONTINUED | OUTPATIENT
Start: 2024-09-29 | End: 2024-10-03 | Stop reason: HOSPADM

## 2024-09-29 RX ADMIN — Medication 10 ML: at 09:21

## 2024-09-29 RX ADMIN — ATORVASTATIN CALCIUM 40 MG: 40 TABLET, FILM COATED ORAL at 09:21

## 2024-09-29 RX ADMIN — CARVEDILOL 25 MG: 25 TABLET, FILM COATED ORAL at 18:02

## 2024-09-29 RX ADMIN — ARIPIPRAZOLE 2 MG: 2 TABLET ORAL at 09:21

## 2024-09-29 RX ADMIN — Medication 10 ML: at 20:29

## 2024-09-29 RX ADMIN — INSULIN LISPRO 2 UNITS: 100 INJECTION, SOLUTION INTRAVENOUS; SUBCUTANEOUS at 20:38

## 2024-09-29 RX ADMIN — LEVOTHYROXINE SODIUM 175 MCG: 75 TABLET ORAL at 05:39

## 2024-09-29 RX ADMIN — PANTOPRAZOLE SODIUM 40 MG: 40 TABLET, DELAYED RELEASE ORAL at 09:21

## 2024-09-29 RX ADMIN — Medication 10 MG: at 00:02

## 2024-09-29 RX ADMIN — TORSEMIDE 100 MG: 20 TABLET ORAL at 09:46

## 2024-09-29 RX ADMIN — SERTRALINE HYDROCHLORIDE 100 MG: 100 TABLET ORAL at 20:29

## 2024-09-29 RX ADMIN — SEVELAMER CARBONATE 800 MG: 800 TABLET, FILM COATED ORAL at 09:21

## 2024-09-29 RX ADMIN — ARIPIPRAZOLE 2 MG: 2 TABLET ORAL at 20:29

## 2024-09-29 RX ADMIN — SEVELAMER CARBONATE 1600 MG: 800 TABLET, FILM COATED ORAL at 18:02

## 2024-09-29 RX ADMIN — LIDOCAINE 1 PATCH: 4 PATCH TOPICAL at 09:20

## 2024-09-29 NOTE — PLAN OF CARE
Goal Outcome Evaluation:  Plan of Care Reviewed With: patient        Progress: improving  Outcome Evaluation: VSS, Pt c/o back and right shoulder pain. Medicated with tylenol, aspercream, lidocaine patch and heating pad per orders. Pt is alert and oriented, appetite has improved and Pt tolerated PO. Continue plan of care.

## 2024-09-29 NOTE — PLAN OF CARE
Goal Outcome Evaluation:             Outcome Evaluation: Pt is intermittently confused. MRI of thoracic and lumbar spine performed today. Pt voided once. Complaints of pain in upper abdomen and back but does not want pain medication. Informed MD of low BP and held coreg. Continue plan of care.

## 2024-09-29 NOTE — PROGRESS NOTES
"Nephrology progress note    Galileo Cook MD     Current history: Patient is much more alert today, some back pain questionable mild abdominal discomfort denies any shortness of breath denies any nausea or vomiting    Current medication list:  ARIPiprazole, 2 mg, Oral, BID  aspirin, 324 mg, Oral, Once  atorvastatin, 40 mg, Oral, Daily  carvedilol, 25 mg, Oral, BID With Meals  insulin lispro, 2-9 Units, Subcutaneous, 4x Daily AC & at Bedtime  levothyroxine, 175 mcg, Oral, Q AM  Lidocaine, 1 patch, Transdermal, Daily  pantoprazole, 40 mg, Oral, Daily  sertraline, 100 mg, Oral, Nightly  sevelamer, 800 mg, Oral, BID  sodium chloride, 10 mL, Intravenous, Q12H  torsemide, 100 mg, Oral, Daily         acetaminophen **OR** acetaminophen **OR** acetaminophen    cloNIDine    dextrose    dextrose    glucagon (human recombinant)    [Held by provider] HYDROmorphone    melatonin    prochlorperazine    sodium chloride    sodium chloride    sodium chloride    trolamine salicylate     Physical Exam:  /51 (BP Location: Right arm, Patient Position: Lying)   Pulse 80   Temp 98.5 °F (36.9 °C) (Oral)   Resp 18   Ht 167.6 cm (66\")   Wt 77.2 kg (170 lb 3.1 oz)   SpO2 90%   BMI 27.47 kg/m²     Intake/Output Summary (Last 24 hours) at 9/29/2024 1257  Last data filed at 9/29/2024 1032  Gross per 24 hour   Intake 250 ml   Output --   Net 250 ml      General: Patient alert , no acute distress  Cardiac: Regular rate and rhythm without a rub no S3 or S4  Chest: Tunneled catheter noted to right upper chest without drainage  Lungs: Clear bilaterally no wheezes rhonchi or rales  Abdomen: Bowel sounds positive nontender soft   Extremities: No edema or cyanosis  Skin: No acute rashes noted  : Deferred      Results from last 7 days   Lab Units 09/29/24  0551 09/28/24  0400 09/28/24  0126 09/27/24  1655   SODIUM mmol/L 131* 133* 136 137   POTASSIUM mmol/L 4.6 4.1 4.2 3.8   CHLORIDE mmol/L 90* 94* 94* 95*   CO2 mmol/L 22.6 26.3 28.0 " 29.4*   BUN mg/dL 76* 59* 59* 48*   CREATININE mg/dL 8.25* 6.43* 5.95* 5.36*   CALCIUM mg/dL 9.9 9.6 9.8 9.7   BILIRUBIN mg/dL  --   --  0.7 0.6   ALK PHOS U/L  --   --  79 95   ALT (SGPT) U/L  --   --  12 12   AST (SGOT) U/L  --   --  14 15   GLUCOSE mg/dL 139* 170* 140* 147*       Estimated Creatinine Clearance: 7.2 mL/min (A) (by C-G formula based on SCr of 8.25 mg/dL (H)).    Results from last 7 days   Lab Units 09/29/24  0551 09/28/24  0126 09/27/24  1655   MAGNESIUM mg/dL 2.1 1.5* 1.6   PHOSPHORUS mg/dL 6.1*  --   --              Results from last 7 days   Lab Units 09/29/24  0551 09/28/24  0400 09/28/24  0126 09/27/24  1655   WBC 10*3/mm3 7.49 7.95 7.67 7.60   HEMOGLOBIN g/dL 9.8* 10.8* 10.7* 10.7*   PLATELETS 10*3/mm3 100* 103* 113* 111*             Imaging Results (Last 24 Hours)       Procedure Component Value Units Date/Time    MRI Lumbar Spine Without Contrast [[id number]] Collected: 09/29/24 1238     Updated: 09/29/24 1244    Narrative:         MRI LUMBAR SPINE WO CONTRAST-     Date of Exam: 9/29/2024 12:09 PM     Indication: Back pain; chronic compression fractures; N18.6-End stage  renal disease; Z99.2-Dependence on renal dialysis; R52-Pain,  unspecified; R26.2-Difficulty in walking, not elsewhere classified.     Comparison: CT scan of the abdomen and pelvis 4/8/2024.     Technique:  Routine multiplanar/multisequence sequence images of the  lumbar spine were obtained without contrast administration.          Findings:  There is an old L3 compression fracture. The conus medullaris has a  normal appearance ending at the L1-L2 level. There is an old L3  compression fracture with mild retropulsion. No evidence of acute marrow  edema or fracture. No paraspinal masses are identified. The abdominal  aorta has a normal caliber. Disc degeneration and facet OA are present  throughout the lumbar spine with areas of mild/moderate central canal  and foraminal stenosis. No evidence of high-grade central canal  stenosis  or high-grade foraminal stenosis.       Impression:      Impression:     1. Old L3 compression fracture with mild retropulsion. No acute  fractures are identified.     2. Degenerative change throughout the lumbar spine with areas of  mild/moderate central canal and foraminal stenosis.           Electronically Signed By-EPIFANIO RENTERIA MD On:9/29/2024 12:42 PM       MRI Thoracic Spine Without Contrast [[id number]] Collected: 09/29/24 1228     Updated: 09/29/24 1235    Narrative:         MRI THORACIC SPINE WO CONTRAST-     Date of Exam: 9/29/2024 12:08 PM     Indication: back pain;; N18.6-End stage renal disease; Z99.2-Dependence  on renal dialysis; R52-Pain, unspecified; R26.2-Difficulty in walking,  not elsewhere classified.     Comparison: CT scan of the chest 5/24/2024.     Technique:  Routine multiplanar/multisequence sequence images of the  thoracic spine were obtained without contrast administration.        Findings:  Endplate degenerative changes are present throughout the thoracic spine  with marginal osteophytes. No evidence of acute marrow edema or acute  fracture. There is an old T12 compression fracture. No paraspinal masses  are identified. The thoracic cord has a normal appearance. No evidence  of abnormal intramedullary signal or myelomalacia. No evidence of  high-grade central canal stenosis. Old mild compression fracture of T10.       Impression:      Impression:  Degenerative change. No acute findings.           Electronically Signed By-EPIFAINO RENTERIA MD On:9/29/2024 12:33 PM                Assessment/plan:    1.  ESRD: Electrolytes are overall stable.  Will plan on hemodialyzing here tomorrow.  Volume status stable.      2.  Anemia: Hemoglobin decreased some, started EPO with dialysis follow trends     3.  History of diabetes mellitus: Treatment per primary     4.  Abdominal pain/chest pain: Further workup and treatment per primary     5.  Hypertension: Blood pressure up-and-down, continue  current medications and as needed clonidine.  Blood pressure improves and even gets low with dialysis     6.  History of hypothyroidism on Synthroid    7.  Hyponatremia: Secondary to water intake and renal failure will improve with each dialysis    8.  Hyperphosphatemia: Continue phosphate binder but adjust dosage and time with meals          Galileo Cook MD

## 2024-09-29 NOTE — PROGRESS NOTES
[facility]   Hospitalist Progress Note  Date: 2024  Patient Name: Nelson Wang  : 1945  MRN: [id number]  Date of admission: 2024  Room/Bed: [facility]      Subjective   Subjective     Chief Complaint: epigastric pain    Summary:Nelson Wang is a 79 y.o. male with history of diabetes, ESRD on HD, psoriasis, hypertension, dyslipidemia, mild CAD, restrictive lung disease, vertigo, GERD without esophagitis, E. coli bacteremia with septic shock, C. difficile colitis and previous tobacco use who presented with chest pain and abdominal pain. Pain was worsening throughout the day and aggravated with movement. CT abdomen and pelvis along with chest was performed on presentation which showed Interval resolution of previously described 10 mm left lower lobe nodule, some mild atelectasis; colonic diverticulosis without diverticulitis. Surgically absent appendix; Large calculus is again identified in the right renal pelvis; bilateral renal cortical thinning with bilateral renal cysts. Also showed lumbar degenerative disease with chronic compression fractures at L3 and T12 as well as at T10. Patient was started on pain medications and admitted for further evaluation and management. Obtaining TTE to rule out pericarditis though less possibility. Has chronic compression fracture but also having back pain; obtaining MRI of thoracic and lumbar region.    Interval Followup: No acute events overnight. Stated his pain is better today. Laying in bed, not in acute distress. More awake and alert today. MRI thoracic and lumbar pending.    Review of Systems    All systems reviewed and negative except for what is outlined above.      Objective   Objective     Vitals:   Temp:  [97.7 °F (36.5 °C)-99 °F (37.2 °C)] 98.5 °F (36.9 °C)  Heart Rate:  [75-89] 80  Resp:  [16-18] 16  BP: (123-162)/(54-82) 160/59  Flow (L/min):  [1.5-3] 1.5    Physical Exam   General: Awake, alert, NAD  Eyes: pupils equal, no scleral  icterus  Cardiovascular: RRR, no murmurs   Pulmonary: CTA bilaterally; no wheezes; no conversational dyspnea  Gastrointestinal: S/ND/NT, +BS  Neuro: alert, awake, oriented x 3; speech clear; no tremor      Result Review    Result Review:  I have personally reviewed these results:  [x]  Laboratory      Lab 09/29/24  0551 09/28/24  1042 09/28/24  0400 09/28/24  0126   WBC 7.49  --  7.95 7.67   HEMOGLOBIN 9.8*  --  10.8* 10.7*   HEMATOCRIT 31.7*  --  34.6* 34.0*   PLATELETS 100*  --  103* 113*   NEUTROS ABS 4.96  --  6.03 5.30   IMMATURE GRANS (ABS) 0.04  --  0.05 0.07*   LYMPHS ABS 1.51  --  1.06 1.28   MONOS ABS 0.90  --  0.78 0.93*   EOS ABS 0.05  --  0.02 0.07   MCV 97.5*  --  95.6 95.5   SED RATE  --   --  75*  --    CRP  --   --  4.44*  --    LACTATE  --  1.6  --   --          Lab 09/29/24  0551 09/28/24  0400 09/28/24  0126 09/27/24  1655   SODIUM 131* 133* 136 137   POTASSIUM 4.6 4.1 4.2 3.8   CHLORIDE 90* 94* 94* 95*   CO2 22.6 26.3 28.0 29.4*   ANION GAP 18.4* 12.7 14.0 12.6   BUN 76* 59* 59* 48*   CREATININE 8.25* 6.43* 5.95* 5.36*   EGFR 6.1* 8.3* 9.1* 10.3*   GLUCOSE 139* 170* 140* 147*   CALCIUM 9.9 9.6 9.8 9.7   MAGNESIUM 2.1  --  1.5* 1.6   PHOSPHORUS 6.1*  --   --   --          Lab 09/28/24  0126 09/27/24  1655   TOTAL PROTEIN 7.8 7.6   ALBUMIN 4.1 4.2   GLOBULIN 3.7 3.4   ALT (SGPT) 12 12   AST (SGOT) 14 15   BILIRUBIN 0.7 0.6   ALK PHOS 79 95   LIPASE 34  --          Lab 09/28/24  0400 09/28/24  0126 09/27/24  1655   PROBNP 3,698.0* 3,555.0*  --    HSTROP T 75* 74* 73*                 Brief Urine Lab Results  (Last result in the past 365 days)        Color   Clarity   Blood   Leuk Est   Nitrite   Protein   CREAT   Urine HCG        04/09/24 0412 Yellow   Clear   Small (1+)   Negative   Negative   100 mg/dL (2+)                 [x]  Microbiology   Microbiology Results (last 10 days)       Procedure Component Value - Date/Time    Respiratory Panel PCR w/COVID-19(SARS-CoV-2) RA/TANIA/GAVIN/PAD/COR/NENA  In-House, NP Swab in UTM/VTM, 2 HR TAT - Swab, Nasopharynx [[id number]]  (Normal) Collected: 09/28/24 1453    Lab Status: Final result Specimen: Swab from Nasopharynx Updated: 09/28/24 1549     ADENOVIRUS, PCR Not Detected     Coronavirus 229E Not Detected     Coronavirus HKU1 Not Detected     Coronavirus NL63 Not Detected     Coronavirus OC43 Not Detected     COVID19 Not Detected     Human Metapneumovirus Not Detected     Human Rhinovirus/Enterovirus Not Detected     Influenza A PCR Not Detected     Influenza B PCR Not Detected     Parainfluenza Virus 1 Not Detected     Parainfluenza Virus 2 Not Detected     Parainfluenza Virus 3 Not Detected     Parainfluenza Virus 4 Not Detected     RSV, PCR Not Detected     Bordetella pertussis pcr Not Detected     Bordetella parapertussis PCR Not Detected     Chlamydophila pneumoniae PCR Not Detected     Mycoplasma pneumo by PCR Not Detected    Narrative:      In the setting of a positive respiratory panel with a viral infection PLUS a negative procalcitonin without other underlying concern for bacterial infection, consider observing off antibiotics or discontinuation of antibiotics and continue supportive care. If the respiratory panel is positive for atypical bacterial infection (Bordetella pertussis, Chlamydophila pneumoniae, or Mycoplasma pneumoniae), consider antibiotic de-escalation to target atypical bacterial infection.    COVID-19, FLU A/B, RSV PCR 1 HR TAT - Swab, Nasopharynx [[id number]]  (Normal) Collected: 09/27/24 1715    Lab Status: Final result Specimen: Swab from Nasopharynx Updated: 09/27/24 1757     COVID19 Not Detected     Influenza A PCR Not Detected     Influenza B PCR Not Detected     RSV, PCR Not Detected    Narrative:      Fact sheet for providers: https://www.fda.gov/media/861362/download    Fact sheet for patients: https://www.fda.gov/media/989029/download    Test performed by PCR.          [x]  Radiology  CT Abdomen Pelvis Without Contrast    Result  Date: 9/28/2024  1. No bowel obstruction or evidence of colitis. There is colonic diverticulosis without diverticulitis. Surgically absent appendix. 2. Large calculus is again identified in the right renal pelvis, not significantly changed. No hydronephrosis on either side. There is bilateral renal cortical thinning with bilateral renal cysts. 3. Interval removal of a peritoneal dialysis catheter. Electronically Signed: Cristian Tiwari MD  9/28/2024 2:29 AM EDT  Workstation ID: [id number]    CT Chest Without Contrast Diagnostic    Result Date: 9/28/2024  1. Interval resolution of previously described 10 mm left lower lobe nodule, presumed infectious or inflammatory. 2. Groundglass changes in the lower lobes, most likely secondary to some mild atelectasis. Pneumonitis is considered less likely. No dense consolidations. No clear evidence of volume overload. 3. Cardiomegaly with atherosclerotic disease and coronary artery disease. Electronically Signed: Cristian Tiwari MD  9/28/2024 2:17 AM EDT  Workstation ID: [id number]    XR Chest 1 View    Result Date: 9/28/2024  No acute cardiopulmonary findings. Electronically Signed: Cristian Tiwari MD  9/28/2024 1:33 AM EDT  Workstation ID: [id number]    XR Chest 1 View    Result Date: 9/27/2024  Impression: No acute cardiopulmonary findings. Electronically Signed: Claus Sánchez MD  9/27/2024 5:44 PM EDT  Workstation ID: [id number]   []  EKG/Telemetry   []  Cardiology/Vascular   []  Pathology  []  Old records  []  Other:    Assessment & Plan   Assessment / Plan     Assessment:  Epigastric pain, improving  Intractable Back pain, stable  History of diabetes mellitus  ESRD on HD  Psoriasis  Hypertension  Dyslipidemia  Mild CAD  Restrictive lung disease  Vertigo  GERD without esophagitis      Plan:  Patient currently being managed in medicine service.  Supportive management.  Holding as needed IV dilaudid given possible sedation. Awake and alert today.  Had cardiac cath recently; showed  mild CAD.  Elevated troponin but could be in the setting of ESRD; better than prior. EKG non significant.  Obtaining TTE to r/o possible pericarditis though low probability given non significant troponin and EKG. Does have epigastric pain worse with movement and elevated ESR, CRP.  CT scan showing lumbar degenerative disease with chronic compression fractures at L3 and T12 as well as at T10. Pain could be related to it.-IF persistent, obtaining MRI of thoracic and lumbar spine.  Nephrologist consulted; plan for HD possibly tomorrow on Monday. Appreciate further input.  Continue rest of the current management.  Clinical course to determine disposition; likely in next 24-48 hours.     Discussed with RN.    VTE Prophylaxis:  Mechanical VTE prophylaxis orders are present.        CODE STATUS:   Code Status (Patient has no pulse and is not breathing): CPR (Attempt to Resuscitate)  Medical Interventions (Patient has pulse or is breathing): Full Support      Electronically signed by Elyse Beltran MD, 09/29/24, 8:58 AM EDT.

## 2024-09-30 ENCOUNTER — APPOINTMENT (OUTPATIENT)
Dept: GENERAL RADIOLOGY | Facility: HOSPITAL | Age: 79
DRG: 391 | End: 2024-09-30
Payer: MEDICARE

## 2024-09-30 LAB
ANION GAP SERPL CALCULATED.3IONS-SCNC: 18.9 MMOL/L (ref 5–15)
BASOPHILS # BLD AUTO: 0.01 10*3/MM3 (ref 0–0.2)
BASOPHILS NFR BLD AUTO: 0.2 % (ref 0–1.5)
BUN SERPL-MCNC: 93 MG/DL (ref 8–23)
BUN/CREAT SERPL: 9.4 (ref 7–25)
CALCIUM SPEC-SCNC: 9.3 MG/DL (ref 8.6–10.5)
CHLORIDE SERPL-SCNC: 87 MMOL/L (ref 98–107)
CO2 SERPL-SCNC: 22.1 MMOL/L (ref 22–29)
CREAT SERPL-MCNC: 9.93 MG/DL (ref 0.76–1.27)
DEPRECATED RDW RBC AUTO: 50.3 FL (ref 37–54)
EGFRCR SERPLBLD CKD-EPI 2021: 4.9 ML/MIN/1.73
EOSINOPHIL # BLD AUTO: 0.07 10*3/MM3 (ref 0–0.4)
EOSINOPHIL NFR BLD AUTO: 1.1 % (ref 0.3–6.2)
ERYTHROCYTE [DISTWIDTH] IN BLOOD BY AUTOMATED COUNT: 14.6 % (ref 12.3–15.4)
GLUCOSE BLDC GLUCOMTR-MCNC: 159 MG/DL (ref 70–99)
GLUCOSE BLDC GLUCOMTR-MCNC: 159 MG/DL (ref 70–99)
GLUCOSE BLDC GLUCOMTR-MCNC: 180 MG/DL (ref 70–99)
GLUCOSE SERPL-MCNC: 191 MG/DL (ref 65–99)
HBV SURFACE AB SER RIA-ACNC: REACTIVE
HBV SURFACE AG SERPL QL IA: NORMAL
HCT VFR BLD AUTO: 29.3 % (ref 37.5–51)
HGB BLD-MCNC: 9.1 G/DL (ref 13–17.7)
HOLD SPECIMEN: NORMAL
IMM GRANULOCYTES # BLD AUTO: 0.04 10*3/MM3 (ref 0–0.05)
IMM GRANULOCYTES NFR BLD AUTO: 0.6 % (ref 0–0.5)
LYMPHOCYTES # BLD AUTO: 1.15 10*3/MM3 (ref 0.7–3.1)
LYMPHOCYTES NFR BLD AUTO: 17.5 % (ref 19.6–45.3)
MAGNESIUM SERPL-MCNC: 2 MG/DL (ref 1.6–2.4)
MCH RBC QN AUTO: 29.5 PG (ref 26.6–33)
MCHC RBC AUTO-ENTMCNC: 31.1 G/DL (ref 31.5–35.7)
MCV RBC AUTO: 95.1 FL (ref 79–97)
MONOCYTES # BLD AUTO: 0.65 10*3/MM3 (ref 0.1–0.9)
MONOCYTES NFR BLD AUTO: 9.9 % (ref 5–12)
NEUTROPHILS NFR BLD AUTO: 4.67 10*3/MM3 (ref 1.7–7)
NEUTROPHILS NFR BLD AUTO: 70.7 % (ref 42.7–76)
NRBC BLD AUTO-RTO: 0 /100 WBC (ref 0–0.2)
PHOSPHATE SERPL-MCNC: 6.7 MG/DL (ref 2.5–4.5)
PLATELET # BLD AUTO: 105 10*3/MM3 (ref 140–450)
PMV BLD AUTO: 11.2 FL (ref 6–12)
POTASSIUM SERPL-SCNC: 4.5 MMOL/L (ref 3.5–5.2)
PROCALCITONIN SERPL-MCNC: 0.78 NG/ML (ref 0–0.25)
QT INTERVAL: 330 MS
QTC INTERVAL: 439 MS
RBC # BLD AUTO: 3.08 10*6/MM3 (ref 4.14–5.8)
SODIUM SERPL-SCNC: 128 MMOL/L (ref 136–145)
WBC NRBC COR # BLD AUTO: 6.59 10*3/MM3 (ref 3.4–10.8)

## 2024-09-30 PROCEDURE — 97165 OT EVAL LOW COMPLEX 30 MIN: CPT

## 2024-09-30 PROCEDURE — 84100 ASSAY OF PHOSPHORUS: CPT | Performed by: STUDENT IN AN ORGANIZED HEALTH CARE EDUCATION/TRAINING PROGRAM

## 2024-09-30 PROCEDURE — 82948 REAGENT STRIP/BLOOD GLUCOSE: CPT

## 2024-09-30 PROCEDURE — 87340 HEPATITIS B SURFACE AG IA: CPT | Performed by: INTERNAL MEDICINE

## 2024-09-30 PROCEDURE — 25010000002 EPOETIN ALFA PER 1000 UNITS: Performed by: INTERNAL MEDICINE

## 2024-09-30 PROCEDURE — 99232 SBSQ HOSP IP/OBS MODERATE 35: CPT | Performed by: STUDENT IN AN ORGANIZED HEALTH CARE EDUCATION/TRAINING PROGRAM

## 2024-09-30 PROCEDURE — 86704 HEP B CORE ANTIBODY TOTAL: CPT | Performed by: INTERNAL MEDICINE

## 2024-09-30 PROCEDURE — 93010 ELECTROCARDIOGRAM REPORT: CPT | Performed by: STUDENT IN AN ORGANIZED HEALTH CARE EDUCATION/TRAINING PROGRAM

## 2024-09-30 PROCEDURE — 86706 HEP B SURFACE ANTIBODY: CPT | Performed by: INTERNAL MEDICINE

## 2024-09-30 PROCEDURE — 80048 BASIC METABOLIC PNL TOTAL CA: CPT | Performed by: STUDENT IN AN ORGANIZED HEALTH CARE EDUCATION/TRAINING PROGRAM

## 2024-09-30 PROCEDURE — 85025 COMPLETE CBC W/AUTO DIFF WBC: CPT | Performed by: STUDENT IN AN ORGANIZED HEALTH CARE EDUCATION/TRAINING PROGRAM

## 2024-09-30 PROCEDURE — 82948 REAGENT STRIP/BLOOD GLUCOSE: CPT | Performed by: STUDENT IN AN ORGANIZED HEALTH CARE EDUCATION/TRAINING PROGRAM

## 2024-09-30 PROCEDURE — 94761 N-INVAS EAR/PLS OXIMETRY MLT: CPT

## 2024-09-30 PROCEDURE — 94618 PULMONARY STRESS TESTING: CPT

## 2024-09-30 PROCEDURE — 71045 X-RAY EXAM CHEST 1 VIEW: CPT

## 2024-09-30 PROCEDURE — 63710000001 INSULIN LISPRO (HUMAN) PER 5 UNITS: Performed by: STUDENT IN AN ORGANIZED HEALTH CARE EDUCATION/TRAINING PROGRAM

## 2024-09-30 PROCEDURE — 93005 ELECTROCARDIOGRAM TRACING: CPT | Performed by: STUDENT IN AN ORGANIZED HEALTH CARE EDUCATION/TRAINING PROGRAM

## 2024-09-30 PROCEDURE — 94799 UNLISTED PULMONARY SVC/PX: CPT

## 2024-09-30 PROCEDURE — 84145 PROCALCITONIN (PCT): CPT | Performed by: STUDENT IN AN ORGANIZED HEALTH CARE EDUCATION/TRAINING PROGRAM

## 2024-09-30 PROCEDURE — 83735 ASSAY OF MAGNESIUM: CPT | Performed by: STUDENT IN AN ORGANIZED HEALTH CARE EDUCATION/TRAINING PROGRAM

## 2024-09-30 RX ORDER — AMOXICILLIN 250 MG
2 CAPSULE ORAL 2 TIMES DAILY PRN
Status: DISCONTINUED | OUTPATIENT
Start: 2024-09-30 | End: 2024-10-03 | Stop reason: HOSPADM

## 2024-09-30 RX ORDER — BISACODYL 10 MG
10 SUPPOSITORY, RECTAL RECTAL DAILY PRN
Status: DISCONTINUED | OUTPATIENT
Start: 2024-09-30 | End: 2024-10-03 | Stop reason: HOSPADM

## 2024-09-30 RX ORDER — OXYMETAZOLINE HYDROCHLORIDE 0.05 G/100ML
2 SPRAY NASAL 2 TIMES DAILY
Status: DISCONTINUED | OUTPATIENT
Start: 2024-09-30 | End: 2024-10-03 | Stop reason: HOSPADM

## 2024-09-30 RX ORDER — POLYETHYLENE GLYCOL 3350 17 G/17G
17 POWDER, FOR SOLUTION ORAL DAILY PRN
Status: DISCONTINUED | OUTPATIENT
Start: 2024-09-30 | End: 2024-10-03 | Stop reason: HOSPADM

## 2024-09-30 RX ORDER — BISACODYL 5 MG/1
5 TABLET, DELAYED RELEASE ORAL DAILY PRN
Status: DISCONTINUED | OUTPATIENT
Start: 2024-09-30 | End: 2024-10-03 | Stop reason: HOSPADM

## 2024-09-30 RX ORDER — ECHINACEA PURPUREA EXTRACT 125 MG
2 TABLET ORAL AS NEEDED
Status: DISCONTINUED | OUTPATIENT
Start: 2024-09-30 | End: 2024-10-03 | Stop reason: HOSPADM

## 2024-09-30 RX ADMIN — SERTRALINE HYDROCHLORIDE 100 MG: 100 TABLET ORAL at 20:11

## 2024-09-30 RX ADMIN — CARVEDILOL 25 MG: 25 TABLET, FILM COATED ORAL at 18:09

## 2024-09-30 RX ADMIN — ARIPIPRAZOLE 2 MG: 2 TABLET ORAL at 15:27

## 2024-09-30 RX ADMIN — ACETAMINOPHEN 650 MG: 325 TABLET ORAL at 20:44

## 2024-09-30 RX ADMIN — INSULIN LISPRO 2 UNITS: 100 INJECTION, SOLUTION INTRAVENOUS; SUBCUTANEOUS at 20:45

## 2024-09-30 RX ADMIN — SEVELAMER CARBONATE 1600 MG: 800 TABLET, FILM COATED ORAL at 18:09

## 2024-09-30 RX ADMIN — TORSEMIDE 100 MG: 20 TABLET ORAL at 14:50

## 2024-09-30 RX ADMIN — OXYMETAZOLINE HYDROCHLORIDE 2 SPRAY: 0.5 SPRAY NASAL at 20:12

## 2024-09-30 RX ADMIN — ATORVASTATIN CALCIUM 40 MG: 40 TABLET, FILM COATED ORAL at 14:52

## 2024-09-30 RX ADMIN — Medication 10 ML: at 20:45

## 2024-09-30 RX ADMIN — EPOETIN ALFA 5000 UNITS: 3000 SOLUTION INTRAVENOUS; SUBCUTANEOUS at 12:30

## 2024-09-30 RX ADMIN — ARIPIPRAZOLE 2 MG: 2 TABLET ORAL at 20:11

## 2024-09-30 RX ADMIN — SENNOSIDES AND DOCUSATE SODIUM 2 TABLET: 50; 8.6 TABLET ORAL at 18:09

## 2024-09-30 RX ADMIN — PANTOPRAZOLE SODIUM 40 MG: 40 TABLET, DELAYED RELEASE ORAL at 14:52

## 2024-09-30 RX ADMIN — LEVOTHYROXINE SODIUM 175 MCG: 75 TABLET ORAL at 06:09

## 2024-09-30 RX ADMIN — INSULIN LISPRO 2 UNITS: 100 INJECTION, SOLUTION INTRAVENOUS; SUBCUTANEOUS at 18:08

## 2024-09-30 RX ADMIN — APIXABAN 5 MG: 5 TABLET, FILM COATED ORAL at 20:44

## 2024-09-30 RX ADMIN — CARVEDILOL 25 MG: 25 TABLET, FILM COATED ORAL at 07:46

## 2024-09-30 RX ADMIN — APIXABAN 5 MG: 5 TABLET, FILM COATED ORAL at 14:51

## 2024-09-30 RX ADMIN — INSULIN LISPRO 2 UNITS: 100 INJECTION, SOLUTION INTRAVENOUS; SUBCUTANEOUS at 07:46

## 2024-09-30 NOTE — THERAPY EVALUATION
Patient Name: Nelson Wang  : 1945    MRN: [id number]                              Today's Date: 2024       Admit Date: 2024    Visit Dx:     ICD-10-CM ICD-9-CM   1. ESRD on dialysis  N18.6 585.6    Z99.2 V45.11   2. Intractable pain  R52 780.96   3. Difficulty walking  R26.2 719.7     Patient Active Problem List   Diagnosis    Essential hypertension    Bradycardia, sinus    Anemia due to chronic kidney disease    Hypothyroidism    Idiopathic gout    Proteinuria    Psoriasis    Thrombocytopenia    Type 2 diabetes mellitus without complication    CKD (chronic kidney disease), stage IV    Hyperlipidemia    Monoclonal gammopathy of unknown significance (MGUS)    Stage 5 chronic kidney disease    Chronic gout without tophus    Peritoneal dialysis catheter dysfunction    Medicare annual wellness visit, subsequent    Chronic cough    Peritonitis associated with peritoneal dialysis    Recurrent pneumonia    Acute on chronic respiratory failure with hypoxia    End stage renal disease    Aspiration pneumonitis    Sepsis    Type 2 diabetes mellitus, with long-term current use of insulin    GERD without esophagitis    Immunosuppression due to drug therapy    Bipolar disorder    Chronic respiratory failure with hypoxia    Shortness of breath    Abnormal stress test    ESRD on dialysis    Abnormal chest CT    Encounter for screening for malignant neoplasm of colon    History of colon polyps    Family history of colon cancer    Intractable pain    Abdominal pain     Past Medical History:   Diagnosis Date    Abnormal stress test     Anemia     IRON INFUSIONS WITH DIALYSIS    Anesthesia     WITH HIP REPLACEMENT DAUGHTER BELIEVES HAD SVT     Ankle pain, right     Anxiety and depression     CKD (chronic kidney disease), stage IV     DIALYSIS [id number] IN RIGHT CHEST    Diabetes     Gout     High cholesterol     History of peritoneal dialysis     HL (hearing loss)     Hyperkalemia      Hypertension     Hypothyroidism     Insomnia     Night terrors     Psoriasis     Psoriasis     Sleep walking     Thrombocytopenia     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    Vitiligo      Past Surgical History:   Procedure Laterality Date    ANKLE SURGERY Right 11/1990    APPENDECTOMY N/A 1954    ARTERIOVENOUS FISTULA/SHUNT SURGERY Left 07/16/2024    Procedure: Creation of left arm arteriovenous fistula;  Surgeon: Moses Mir MD;  Location: [facility] MAIN OR;  Service: Vascular;  Laterality: Left;    BRONCHOSCOPY N/A 03/01/2024    Procedure: BRONCHOSCOPY WITH BAL AND WASHINGS;  Surgeon: Sandra Espinal MD;  Location: [facility] ENDOSCOPY;  Service: Pulmonary;  Laterality: N/A;  PNEUMONIA    BRONCHOSCOPY N/A 08/30/2024    Procedure: BRONCHOSCOPY WITH BALS AND WASHINGS;  Surgeon: Sandra Espinal MD;  Location: [facility] ENDOSCOPY;  Service: Pulmonary;  Laterality: N/A;  MUCOUS PLUGGING    CARDIAC CATHETERIZATION N/A 05/29/2024    Procedure: Left Heart Cath with possible coronary angioplasty;  Surgeon: Stephan Nichols MD;  Location: [facility] CATH INVASIVE LOCATION;  Service: Cardiology;  Laterality: N/A;    COLONOSCOPY N/A 06/2022    [facility]    HIP BIPOLAR REPLACEMENT Right 01/2000    INSERTION HEMODIALYSIS CATHETER Left 04/09/2024    Procedure: HEMODIALYSIS CATHETER INSERTION;  Surgeon: Jose Berry MD;  Location: [facility] MAIN OR;  Service: General;  Laterality: Left;    INSERTION HEMODIALYSIS CATHETER N/A 04/12/2024    Procedure: Tunneled hemodialysis catheter insertion;  Surgeon: Enrique Vinson MD;  Location: [facility] MAIN OR;  Service: Vascular;  Laterality: N/A;    INSERTION PERITONEAL DIALYSIS CATHETER N/A 03/27/2023    Procedure: LAPAROSCOPIC INSERTION PERITONEAL DIALYSIS CATHETER, LAPAROSCOPIC OMENTOPEXY WITH LYSIS OF ADHESIONS;  Surgeon: Jose Berry MD;  Location: [facility] OR;  Service: General;  Laterality: N/A;    INSERTION PERITONEAL DIALYSIS CATHETER Left 07/23/2023     Procedure: REVISION OF PERITONEAL DIALYSIS CATHETER;  Surgeon: Radha Oreilly MD;  Location: [facility] MAIN OR;  Service: General;  Laterality: Left;    REMOVAL PERITONEAL DIALYSIS CATHETER N/A 04/09/2024    Procedure: REMOVAL PERITONEAL DIALYSIS CATHETER;  Surgeon: Jose Berry MD;  Location: [facility] MAIN OR;  Service: General;  Laterality: N/A;    RENAL BIOPSY Left 07/15/2022    UPPER GASTROINTESTINAL ENDOSCOPY      WRIST SURGERY      UNSURE WHICH SIDE DAUGHTER REPORTS HAD SEVERE  CUT FROM WINDOW AND THEY HAD TO DO RECONSTRUCTIVE SURGERY WITH VESSELS AND NERVES      General Information       Row Name 09/30/24 1009          OT Time and Intention    Document Type evaluation  -PG     Mode of Treatment individual therapy;occupational therapy  -PG       Row Name 09/30/24 1009          General Information    Patient Profile Reviewed yes  Lives with spouse and one of his children.  Reportedly independent with all self-care and transfers using no assist device.  1 recent fall reported  -PG     Prior Level of Function independent:;transfer;ADL's  -PG     Existing Precautions/Restrictions fall  -PG     Barriers to Rehab none identified  -PG       Row Name 09/30/24 1009          Occupational Profile    Reason for Services/Referral (Occupational Profile) Patient is a 79-year-old male admitted for end-stage renal disease, intractable pain and confusion.  Patient is being evaluated by Occupational Therapy due to recent decline in ADL function.  No previous OT services identified  -PG       Row Name 09/30/24 1009          Living Environment    People in Home spouse  -PG       Row Name 09/30/24 1009          Cognition    Orientation Status (Cognition) oriented to;person;verbal cues/prompts needed for orientation  -PG       Row Name 09/30/24 1009          Safety Issues, Functional Mobility    Impairments Affecting Function (Mobility) balance;endurance/activity tolerance;strength;cognition  -PG     Cognitive  Impairments, Mobility Safety/Performance insight into deficits/self-awareness;judgment;sequencing abilities;problem-solving/reasoning  Very confused.  Reports they are trying to kill me  -PG               User Key  (r) = Recorded By, (t) = Taken By, (c) = Cosigned By      Initials Name Provider Type    PG Chauncey Goldberg OT Occupational Therapist                     Mobility/ADL's       Row Name 09/30/24 1012          Transfers    Transfers bed-chair transfer  -PG       Row Name 09/30/24 1012          Bed-Chair Transfer    Bed-Chair [place] (Transfers) contact guard;verbal cues  -PG     Assistive Device (Bed-Chair Transfers) walker, front-wheeled  -PG       Row Name 09/30/24 1012          Activities of Daily Living    BADL Assessment/Intervention bathing;upper body dressing;lower body dressing;grooming;toileting  -PG       Row Name 09/30/24 1012          Bathing Assessment/Intervention    [place] Level (Bathing) bathing skills;moderate assist (50% patient effort)  -PG       Row Name 09/30/24 1012          Upper Body Dressing Assessment/Training    [place] Level (Upper Body Dressing) upper body dressing skills;minimum assist (75% patient effort)  -PG       Row Name 09/30/24 1012          Lower Body Dressing Assessment/Training    [place] Level (Lower Body Dressing) lower body dressing skills;maximum assist (25% patient effort)  -PG       Row Name 09/30/24 1012          Grooming Assessment/Training    [place] Level (Grooming) grooming skills;set up  -PG       Row Name 09/30/24 1012          Toileting Assessment/Training    [place] Level (Toileting) toileting skills;dependent (less than 25% patient effort)  -PG               User Key  (r) = Recorded By, (t) = Taken By, (c) = Cosigned By      Initials Name Provider Type    PG Chauncey Goldberg OT Occupational Therapist                   Obj/Interventions       Row Name 09/30/24 1013          Sensory Assessment (Somatosensory)    Sensory  Assessment (Somatosensory) unable/difficult to assess  -PG       [facility] Name 09/30/24 1013          Vision Assessment/Intervention    Visual Impairment/Limitations unable/difficult to assess  -PG       Row Name 09/30/24 1013          Range of Motion Comprehensive    General Range of Motion no range of motion deficits identified  -PG       Row Name 09/30/24 1013          Strength Comprehensive (MMT)    Comment, General Manual Muscle Testing (MMT) Assessment 4 -/5 BUE  -PG       Row Name 09/30/24 1013          Motor Skills    Motor Skills coordination;functional endurance  -PG     Coordination WFL  -PG     Functional Endurance Fair minus  -PG               User Key  (r) = Recorded By, (t) = Taken By, (c) = Cosigned By      Initials Name Provider Type    PG Chauncey Goldberg, OT Occupational Therapist                   Goals/Plan       Row Name 09/30/24 1016          Transfer Goal 1 (OT)    Activity/Assistive Device (Transfer Goal 1, OT) transfers, all  -PG     [place] Level/Cues Needed (Transfer Goal 1, OT) modified independence  -PG     Time Frame (Transfer Goal 1, OT) long term goal (LTG);10 days  -PG       Row Name 09/30/24 1016          Bathing Goal 1 (OT)    Activity/Device (Bathing Goal 1, OT) bathing skills, all  -PG     [place] Level/Cues Needed (Bathing Goal 1, OT) modified independence  -PG     Time Frame (Bathing Goal 1, OT) long term goal (LTG);10 days  -PG       Row Name 09/30/24 1016          Dressing Goal 1 (OT)    Activity/Device (Dressing Goal 1, OT) dressing skills, all  -PG     [place]/Cues Needed (Dressing Goal 1, OT) modified independence  -PG     Time Frame (Dressing Goal 1, OT) long term goal (LTG);10 days  -PG       Row Name 09/30/24 1016          Toileting Goal 1 (OT)    Activity/Device (Toileting Goal 1, OT) toileting skills, all  -PG     [place] Level/Cues Needed (Toileting Goal 1, OT) modified independence  -PG     Time Frame (Toileting Goal 1, OT) long term goal (LTG);10 days   -PG       Row Name 09/30/24 1016          Grooming Goal 1 (OT)    Activity/Device (Grooming Goal 1, OT) grooming skills, all  -PG     [place] (Grooming Goal 1, OT) modified independence  -PG     Time Frame (Grooming Goal 1, OT) long term goal (LTG);10 days  -PG       Row Name 09/30/24 1016          Strength Goal 1 (OT)    Strength Goal 1 (OT) Patient will improve bilateral upper extremity strength to 4+/5 to support independence with self-care activities  -PG     Time Frame (Strength Goal 1, OT) long term goal (LTG);10 days  -PG       Row Name 09/30/24 1016          Problem Specific Goal 1 (OT)    Problem Specific Goal 1 (OT) Patient will improve activity tolerance to good minus to support independence with self-care tasks  -PG     Time Frame (Problem Specific Goal 1, OT) long term goal (LTG);10 days  -PG       Row Name 09/30/24 1016          Therapy Assessment/Plan (OT)    Planned Therapy Interventions (OT) activity tolerance training;BADL retraining;strengthening exercise;transfer/mobility retraining;patient/caregiver education/training;occupation/activity based interventions  -PG               User Key  (r) = Recorded By, (t) = Taken By, (c) = Cosigned By      Initials Name Provider Type    PG Chauncey Goldberg, JOCELYN Occupational Therapist                   Clinical Impression       Row Name 09/30/24 1014          Pain Assessment    Pretreatment Pain Rating 0/10 - no pain  -PG     Posttreatment Pain Rating 0/10 - no pain  -PG       Row Name 09/30/24 1014          Plan of Care Review    Plan of Care Reviewed With patient  -PG     Progress no change  -PG     Outcome Evaluation Patient presents with limitations affecting strength, activity tolerance, and balance impacting patient's ability to return home safely and independently.  The skills of a therapist will be required to safely and effectively implement the following treatment plan to restore maximal level of function  -PG       Row Name 09/30/24 1014           Therapy Assessment/Plan (OT)    Patient/Family Therapy Goal Statement (OT) Return home independently  -PG     Rehab Potential (OT) good, to achieve stated therapy goals  -PG     Criteria for Skilled Therapeutic Interventions Met (OT) yes;meets criteria;skilled treatment is necessary  -PG     Therapy Frequency (OT) 5 times/wk  -PG       Row Name 09/30/24 1014          Therapy Plan Review/Discharge Plan (OT)    Anticipated Discharge Disposition (OT) skilled nursing facility;inpatient rehabilitation facility;sub acute care setting  -PG               User Key  (r) = Recorded By, (t) = Taken By, (c) = Cosigned By      Initials Name Provider Type    PG Chauncey Goldberg OT Occupational Therapist                   Outcome Measures       Row Name 09/30/24 1017          How much help from another is currently needed...    Putting on and taking off regular lower body clothing? 2  -PG     Bathing (including washing, rinsing, and drying) 2  -PG     Toileting (which includes using toilet bed pan or urinal) 2  -PG     Putting on and taking off regular upper body clothing 3  -PG     Taking care of personal grooming (such as brushing teeth) 3  -PG     Eating meals 4  -PG     AM-PAC 6 Clicks Score (OT) 16  -PG       Row Name 09/30/24 1017          Functional Assessment    Outcome Measure Options AM-PAC 6 Clicks Daily Activity (OT);Optimal Instrument  -PG       Row Name 09/30/24 1017          Optimal Instrument    Optimal Instrument Optimal - 3  -PG     Bending/Stooping 3  -PG     Standing 2  -PG     Reaching 1  -PG     From the list, choose the 3 activities you would most like to be able to do without any difficulty Bending/stooping;Standing;Reaching  -PG     Total Score Optimal - 3 6  -PG               User Key  (r) = Recorded By, (t) = Taken By, (c) = Cosigned By      Initials Name Provider Type    Chauncey Vizcarra OT Occupational Therapist                    Occupational Therapy Education       Title: PT OT SLP Therapies (In  Progress)       Topic: Occupational Therapy (In Progress)       Point: ADL training (In Progress)       Description:   Instruct learner(s) on proper safety adaptation and remediation techniques during self care or transfers.   Instruct in proper use of assistive devices.                  Learning Progress Summary             Patient Acceptance, E,D, NR by PG at 9/30/2024 1018                         Point: Home exercise program (In Progress)       Description:   Instruct learner(s) on appropriate technique for monitoring, assisting and/or progressing therapeutic exercises/activities.                  Learning Progress Summary             Patient Acceptance, E,D, NR by PG at 9/30/2024 1018                         Point: Precautions (In Progress)       Description:   Instruct learner(s) on prescribed precautions during self-care and functional transfers.                  Learning Progress Summary             Patient Acceptance, E,D, NR by PG at 9/30/2024 1018                         Point: Body mechanics (In Progress)       Description:   Instruct learner(s) on proper positioning and spine alignment during self-care, functional mobility activities and/or exercises.                  Learning Progress Summary             Patient Acceptance, E,D, NR by PG at 9/30/2024 1018                                         User Key       Initials Effective Dates Name Provider Type Discipline    PG 06/16/21 -  Chauncey Goldberg OT Occupational Therapist OT                  OT Recommendation and Plan  Planned Therapy Interventions (OT): activity tolerance training, BADL retraining, strengthening exercise, transfer/mobility retraining, patient/caregiver education/training, occupation/activity based interventions  Therapy Frequency (OT): 5 times/wk  Plan of Care Review  Plan of Care Reviewed With: patient  Progress: no change  Outcome Evaluation: Patient presents with limitations affecting strength, activity tolerance, and balance  impacting patient's ability to return home safely and independently.  The skills of a therapist will be required to safely and effectively implement the following treatment plan to restore maximal level of function     Time Calculation:   Evaluation Complexity (OT)  Review Occupational Profile/Medical/Therapy History Complexity: brief/low complexity  Assessment, Occupational Performance/Identification of Deficit Complexity: 3-5 performance deficits  Clinical Decision Making Complexity (OT): problem focused assessment/low complexity  Overall Complexity of Evaluation (OT): low complexity     Time Calculation- OT       Row Name 09/30/24 1018             Time Calculation- OT    OT Received On 09/30/24  -PG      OT Goal Re-Cert Due Date 10/09/24  -PG         Untimed Charges    OT Eval/Re-eval Minutes 35  -PG         Total Minutes    Untimed Charges Total Minutes 35  -PG       Total Minutes 35  -PG                User Key  (r) = Recorded By, (t) = Taken By, (c) = Cosigned By      Initials Name Provider Type    PG Chauncey Goldberg OT Occupational Therapist                  Therapy Charges for Today       Code Description Service Date Service Provider Modifiers Qty    [id number] HC OT EVAL LOW COMPLEXITY 3 9/30/2024 Chauncey Goldberg OT GO 1                 Chauncey Goldberg OT  9/30/2024

## 2024-09-30 NOTE — NURSING NOTE
Duration of Treatment 3.5 Hours   Access Site Tunneled Dialysis Catheter   Dialyzer F160    mL/min   Dialysate Temperature (C) 36   Use Crit-Line? No   BFR-As tolerated to a maximum of: 450 mL/min   Dialysate Solution Bath: K+ = 2 mEq, Ca = 3.5mEq   Bicarb 33 mEq   Na+ 140 mEq   Fluid Removal: 2     Pt completed entire 3.5 hour tx. Removed 2L. Post /78. Arterial accessed through fistula and venous through catheter due to mild swelling at fistula near venous site. No issues with tx. Pt did become increasingly more confused towards end of treatment. Primary RN made aware. Dialysis RN walked with transport back to room to help pt remain calm. Pt was stable, calm, with bed alarm on when dialysis RN left the room.     Report given to Hilaria MARADIAGA.

## 2024-09-30 NOTE — PLAN OF CARE
Goal Outcome Evaluation:              Outcome Evaluation: Pt is very confused andrequires reorientatio frequently. Pt attempted BM, and daughter reported grimacing, but pt refused Tylenol for pain. Pt is on 2LNC with O2 SAT 92%. Continuing to reorient and redirect to ensure pt safety.

## 2024-09-30 NOTE — PROGRESS NOTES
Walking Oximetry Progress Note      Patient Name:  Nelson Wang  YOB: 1945  Date of Procedure: 09/30/24              ROOM AIR BASELINE   SpO2% 77   Heart Rate 120     EXERCISE ON ROOM AIR SpO2% EXERCISE ON O2 LPM SpO2%   1 MINUTE  1 MINUTE 5 PD 93   2 MINUTES  2 MINUTES 5 PD 91   3 MINUTES  3 MINUTES 5 PD 92   4 MINUTES  4 MINUTES 5 PD 92   5 MINUTES  5 MINUTES 5 PD 93   6 MINUTES  6 MINUTES 5 PD 93              Time to Recovery  2 minutes   SpO2% Post Exercise  94 on 2 LPM CF.    HR Post Exercise  126     Comments:           Electronically signed by Hector Mortensen CRT, 09/30/24, 3:45 PM EDT.

## 2024-09-30 NOTE — PROGRESS NOTES
[facility]   Hospitalist Progress Note  Date: 2024  Patient Name: Nelson Wang  : 1945  MRN: [id number]  Date of admission: 2024  Room/Bed: [facility]      Subjective   Subjective     Chief Complaint: epigastric pain    Summary:Nelson Wang is a 79 y.o. male with history of diabetes, ESRD on HD, psoriasis, hypertension, dyslipidemia, mild CAD, restrictive lung disease, vertigo, GERD without esophagitis, E. coli bacteremia with septic shock, C. difficile colitis and previous tobacco use who presented with chest pain and abdominal pain. Pain was worsening throughout the day and aggravated with movement. CT abdomen and pelvis along with chest was performed on presentation which showed Interval resolution of previously described 10 mm left lower lobe nodule, some mild atelectasis; colonic diverticulosis without diverticulitis. Surgically absent appendix; Large calculus is again identified in the right renal pelvis; bilateral renal cortical thinning with bilateral renal cysts. Also showed lumbar degenerative disease with chronic compression fractures at L3 and T12 as well as at T10. Patient was started on pain medications and admitted for further evaluation and management. Obtaining TTE to rule out pericarditis though less possibility. Has chronic compression fracture but also having back pain; MRI of thoracic and lumbar region showed degenerative changes and old T12 and L3 compression fracture; nothing acute. Nephrology following, plan for HD today.    Interval Followup: No acute events overnight. Intermittent confusion present; likely hospital induced delirium.Stated his pain is better today. Laying in bed, not in acute distress. Awake and alert, sitting at the edge of bed and eating lunch. 6MWT test obtained for desaturation on ambulation. Underwent HD and tolerated the procedure well.    During HD, patient's rhythm converted to Atrial fibrillation, new onset. Started on Eliquis for  AC.    Review of Systems    All systems reviewed and negative except for what is outlined above.      Objective   Objective     Vitals:   Temp:  [98.1 °F (36.7 °C)-99.5 °F (37.5 °C)] 98.3 °F (36.8 °C)  Heart Rate:  [] 114  Resp:  [14-16] 16  BP: ()/(45-90) 120/71  Flow (L/min):  [2] 2    Physical Exam   General: Awake, alert, NAD  Eyes: pupils equal, no scleral icterus  Cardiovascular: RRR, no murmurs   Pulmonary: CTA bilaterally; no wheezes; no conversational dyspnea  Gastrointestinal: S/ND/NT, +BS  Neuro: alert, awake, oriented x 3; speech clear; no tremor      Result Review    Result Review:  I have personally reviewed these results:  [x]  Laboratory      Lab 09/30/24  0744 09/30/24  0606 09/29/24  0551 09/28/24  1042 09/28/24  0400   WBC  --  6.59 7.49  --  7.95   HEMOGLOBIN  --  9.1* 9.8*  --  10.8*   HEMATOCRIT  --  29.3* 31.7*  --  34.6*   PLATELETS  --  105* 100*  --  103*   NEUTROS ABS  --  4.67 4.96  --  6.03   IMMATURE GRANS (ABS)  --  0.04 0.04  --  0.05   LYMPHS ABS  --  1.15 1.51  --  1.06   MONOS ABS  --  0.65 0.90  --  0.78   EOS ABS  --  0.07 0.05  --  0.02   MCV  --  95.1 97.5*  --  95.6   SED RATE  --   --   --   --  75*   CRP  --   --   --   --  4.44*   PROCALCITONIN 0.78*  --   --   --   --    LACTATE  --   --   --  1.6  --          Lab 09/30/24  0606 09/29/24  0551 09/28/24  0400 09/28/24  0126   SODIUM 128* 131* 133* 136   POTASSIUM 4.5 4.6 4.1 4.2   CHLORIDE 87* 90* 94* 94*   CO2 22.1 22.6 26.3 28.0   ANION GAP 18.9* 18.4* 12.7 14.0   BUN 93* 76* 59* 59*   CREATININE 9.93* 8.25* 6.43* 5.95*   EGFR 4.9* 6.1* 8.3* 9.1*   GLUCOSE 191* 139* 170* 140*   CALCIUM 9.3 9.9 9.6 9.8   MAGNESIUM 2.0 2.1  --  1.5*   PHOSPHORUS 6.7* 6.1*  --   --          Lab 09/28/24  0126 09/27/24  1655   TOTAL PROTEIN 7.8 7.6   ALBUMIN 4.1 4.2   GLOBULIN 3.7 3.4   ALT (SGPT) 12 12   AST (SGOT) 14 15   BILIRUBIN 0.7 0.6   ALK PHOS 79 95   LIPASE 34  --          Lab 09/28/24  0400 09/28/24  0126 09/27/24  1655    PROBNP 3,698.0* 3,555.0*  --    HSTROP T 75* 74* 73*                 Brief Urine Lab Results  (Last result in the past 365 days)        Color   Clarity   Blood   Leuk Est   Nitrite   Protein   CREAT   Urine HCG        04/09/24 0412 Yellow   Clear   Small (1+)   Negative   Negative   100 mg/dL (2+)                 [x]  Microbiology   Microbiology Results (last 10 days)       Procedure Component Value - Date/Time    Respiratory Panel PCR w/COVID-19(SARS-CoV-2) RA/TANIA/GAVIN/PAD/COR/NENA In-House, NP Swab in UTM/VTM, 2 HR TAT - Swab, Nasopharynx [[id number]]  (Normal) Collected: 09/28/24 1453    Lab Status: Final result Specimen: Swab from Nasopharynx Updated: 09/28/24 1542     ADENOVIRUS, PCR Not Detected     Coronavirus 229E Not Detected     Coronavirus HKU1 Not Detected     Coronavirus NL63 Not Detected     Coronavirus OC43 Not Detected     COVID19 Not Detected     Human Metapneumovirus Not Detected     Human Rhinovirus/Enterovirus Not Detected     Influenza A PCR Not Detected     Influenza B PCR Not Detected     Parainfluenza Virus 1 Not Detected     Parainfluenza Virus 2 Not Detected     Parainfluenza Virus 3 Not Detected     Parainfluenza Virus 4 Not Detected     RSV, PCR Not Detected     Bordetella pertussis pcr Not Detected     Bordetella parapertussis PCR Not Detected     Chlamydophila pneumoniae PCR Not Detected     Mycoplasma pneumo by PCR Not Detected    Narrative:      In the setting of a positive respiratory panel with a viral infection PLUS a negative procalcitonin without other underlying concern for bacterial infection, consider observing off antibiotics or discontinuation of antibiotics and continue supportive care. If the respiratory panel is positive for atypical bacterial infection (Bordetella pertussis, Chlamydophila pneumoniae, or Mycoplasma pneumoniae), consider antibiotic de-escalation to target atypical bacterial infection.    COVID-19, FLU A/B, RSV PCR 1 HR TAT - Swab, Nasopharynx [[id number]]   (Normal) Collected: 09/27/24 1715    Lab Status: Final result Specimen: Swab from Nasopharynx Updated: 09/27/24 1757     COVID19 Not Detected     Influenza A PCR Not Detected     Influenza B PCR Not Detected     RSV, PCR Not Detected    Narrative:      Fact sheet for providers: https://www.fda.gov/media/353993/download    Fact sheet for patients: https://www.fda.gov/media/385847/download    Test performed by PCR.          [x]  Radiology  XR Chest 1 View    Result Date: 9/30/2024  Impression: Findings of pulmonary edema with trace pleural effusions. Electronically Signed: Solange Iverson MD  9/30/2024 11:50 AM EDT  Workstation ID: [id number]    MRI Lumbar Spine Without Contrast    Result Date: 9/29/2024  Impression:  1. Old L3 compression fracture with mild retropulsion. No acute fractures are identified.  2. Degenerative change throughout the lumbar spine with areas of mild/moderate central canal and foraminal stenosis.    Electronically Signed By-EPIFANIO RENTERIA MD On:9/29/2024 12:42 PM      MRI Thoracic Spine Without Contrast    Result Date: 9/29/2024  Impression: Degenerative change. No acute findings.    Electronically Signed By-EPIFANOI RENTERIA MD On:9/29/2024 12:33 PM      CT Abdomen Pelvis Without Contrast    Result Date: 9/28/2024  1. No bowel obstruction or evidence of colitis. There is colonic diverticulosis without diverticulitis. Surgically absent appendix. 2. Large calculus is again identified in the right renal pelvis, not significantly changed. No hydronephrosis on either side. There is bilateral renal cortical thinning with bilateral renal cysts. 3. Interval removal of a peritoneal dialysis catheter. Electronically Signed: Cristian Tiwari MD  9/28/2024 2:29 AM EDT  Workstation ID: [id number]    CT Chest Without Contrast Diagnostic    Result Date: 9/28/2024  1. Interval resolution of previously described 10 mm left lower lobe nodule, presumed infectious or inflammatory. 2. Groundglass changes in the lower lobes,  most likely secondary to some mild atelectasis. Pneumonitis is considered less likely. No dense consolidations. No clear evidence of volume overload. 3. Cardiomegaly with atherosclerotic disease and coronary artery disease. Electronically Signed: Cristian Tiwari MD  9/28/2024 2:17 AM EDT  Workstation ID: [id number]    XR Chest 1 View    Result Date: 9/28/2024  No acute cardiopulmonary findings. Electronically Signed: Cristian Tiwari MD  9/28/2024 1:33 AM EDT  Workstation ID: [id number]    XR Chest 1 View    Result Date: 9/27/2024  Impression: No acute cardiopulmonary findings. Electronically Signed: Claus Sánchez MD  9/27/2024 5:44 PM EDT  Workstation ID: [id number]   []  EKG/Telemetry   []  Cardiology/Vascular   []  Pathology  []  Old records  []  Other:    Assessment & Plan   Assessment / Plan     Assessment:  Epigastric pain, improving  Intractable Back pain, stable  New onst Atrial fibrillation; TCP2DB4-LMLh of 4  Hypoxia, likely due to fluid overload/pulmonary edema  History of diabetes mellitus  ESRD on HD  Psoriasis  Hypertension  Dyslipidemia  Mild CAD  Restrictive lung disease  Vertigo  GERD without esophagitis      Plan:  Patient currently being managed in medicine service.  Supportive management.  Holding as needed IV dilaudid given possible sedation. Awake and alert today.  Had cardiac cath recently; showed mild CAD.  Elevated troponin but could be in the setting of ESRD; better than prior. EKG non significant.  Transthoracic echo was non significant.  Found to have Atrial fibrillation in telemetry; new onset.  Started on PO Eliquis 5mg BID.  Continue Coreg.  Continue to titrate O2 to maintain SpO2>90%.  CT scan showing lumbar degenerative disease with chronic compression fractures at L3 and T12 as well as at T10. Pain could be related to it.-IF persistent, obtaining MRI of thoracic and lumbar spine.  Nephrologist consulted; underwent HD today. Appreciate further input.  Continue rest of the current  management.  Clinical course to determine disposition; likely in next 24 hours to home.     Discussed with RN.    VTE Prophylaxis:  Pharmacologic & mechanical VTE prophylaxis orders are present.        CODE STATUS:   Code Status (Patient has no pulse and is not breathing): CPR (Attempt to Resuscitate)  Medical Interventions (Patient has pulse or is breathing): Full Support      Electronically signed by Elyse Beltran MD, 09/30/24, 8:58 AM EDT.

## 2024-09-30 NOTE — PLAN OF CARE
Goal Outcome Evaluation:  Plan of Care Reviewed With: patient, daughter           Outcome Evaluation: Patient remained very confused today, noncompliant and pulled everything off. He settled down and took his medications when daughter arrived. Dialysis done today and well tolerated. Possible discharge tomorrow. Continue to monitor.

## 2024-09-30 NOTE — PROGRESS NOTES
"Nephrology progress note    Coreen Castro MD     Current history:   Mr. Wang feels okay today.  Seen earlier this morning, this is a late note entry.  I saw him while receiving dialysis.  Denies shortness of breath and chest pain.  Denied abdominal pain.      Labs reviewed.    Current medication list:  apixaban, 5 mg, Oral, Q12H  ARIPiprazole, 2 mg, Oral, BID  aspirin, 324 mg, Oral, Once  atorvastatin, 40 mg, Oral, Daily  carvedilol, 25 mg, Oral, BID With Meals  epoetin trevor/trevor-epbx, 5,000 Units, Subcutaneous, Once per day on Monday Wednesday Friday  insulin lispro, 2-9 Units, Subcutaneous, 4x Daily AC & at Bedtime  levothyroxine, 175 mcg, Oral, Q AM  Lidocaine, 1 patch, Transdermal, Daily  oxymetazoline, 2 spray, Each Nare, BID  pantoprazole, 40 mg, Oral, Daily  sertraline, 100 mg, Oral, Nightly  sevelamer, 1,600 mg, Oral, TID With Meals  sodium chloride, 10 mL, Intravenous, Q12H  torsemide, 100 mg, Oral, Daily         acetaminophen **OR** acetaminophen **OR** acetaminophen    cloNIDine    dextrose    dextrose    glucagon (human recombinant)    [Held by provider] HYDROmorphone    melatonin    prochlorperazine    sodium chloride    sodium chloride    sodium chloride    sodium chloride    trolamine salicylate     Physical Exam:  /71 (BP Location: Right arm, Patient Position: Lying)   Pulse 114   Temp 98.3 °F (36.8 °C) (Oral)   Resp 16   Ht 167.6 cm (66\")   Wt 77.2 kg (170 lb 3.1 oz)   SpO2 95%   BMI 27.47 kg/m²     Intake/Output Summary (Last 24 hours) at 9/30/2024 1610  Last data filed at 9/30/2024 1300  Gross per 24 hour   Intake 120 ml   Output 2000 ml   Net -1880 ml      General: alert, no acute distress  Cardiac: Regular rate and rhythm without a rub no S3 or S4  Chest: Tunneled catheter noted to right upper chest without drainage, left upper extremity AV fistula with bruising.  Lungs: Clear bilaterally no wheezes rhonchi or rales  Abdomen: Bowel sounds positive nontender soft "   Extremities: No edema or cyanosis  Skin: No acute rashes noted  : Deferred      Results from last 7 days   Lab Units 09/30/24  0606 09/29/24  0551 09/28/24  0400 09/28/24 0126 09/27/24  1655   SODIUM mmol/L 128* 131* 133* 136 137   POTASSIUM mmol/L 4.5 4.6 4.1 4.2 3.8   CHLORIDE mmol/L 87* 90* 94* 94* 95*   CO2 mmol/L 22.1 22.6 26.3 28.0 29.4*   BUN mg/dL 93* 76* 59* 59* 48*   CREATININE mg/dL 9.93* 8.25* 6.43* 5.95* 5.36*   CALCIUM mg/dL 9.3 9.9 9.6 9.8 9.7   BILIRUBIN mg/dL  --   --   --  0.7 0.6   ALK PHOS U/L  --   --   --  79 95   ALT (SGPT) U/L  --   --   --  12 12   AST (SGOT) U/L  --   --   --  14 15   GLUCOSE mg/dL 191* 139* 170* 140* 147*       Estimated Creatinine Clearance: 6 mL/min (A) (by C-G formula based on SCr of 9.93 mg/dL (H)).    Results from last 7 days   Lab Units 09/30/24 0606 09/29/24 0551 09/28/24  0126   MAGNESIUM mg/dL 2.0 2.1 1.5*   PHOSPHORUS mg/dL 6.7* 6.1*  --              Results from last 7 days   Lab Units 09/30/24  0606 09/29/24  0551 09/28/24  0400 09/28/24  0126 09/27/24  1655   WBC 10*3/mm3 6.59 7.49 7.95 7.67 7.60   HEMOGLOBIN g/dL 9.1* 9.8* 10.8* 10.7* 10.7*   PLATELETS 10*3/mm3 105* 100* 103* 113* 111*             Imaging Results (Last 24 Hours)       Procedure Component Value Units Date/Time    XR Chest 1 View [[id number]] Collected: 09/30/24 1148     Updated: 09/30/24 1152    Narrative:      XR CHEST 1 VW    Date of Exam: 9/30/2024 11:32 AM EDT    Indication: Hypoxia    Comparison: CT chest 9/28/2024, chest x-ray 9/28/2024    Findings:  Low lung volumes. Cardiomegaly. Dual-lumen central venous catheter tip terminates at the right atrium. Increased septal thickening. Increased bibasilar airspace opacity. Trace pleural effusions suspected.      Impression:      Impression:  Findings of pulmonary edema with trace pleural effusions.      Electronically Signed: Solange Iverson MD    9/30/2024 11:50 AM EDT    Workstation ID: [id number]             Assessment/plan:    1.  ESRD:  Volume status is adequate.  UF 2 kg with dialysis today.  Continue MWF schedule while here.  Was in the process of getting training to do HHD.      2.  Anemia: Hemoglobin decreased some, started EPO with dialysis follow trends     3.  History of diabetes mellitus: Treatment per primary     4.  Abdominal pain/chest pain: Further workup and treatment per primary, CT scan showed large right renal pelvis stone, unsure if contributing to his pain, currently pain-free.  MRI of lumbar and thoracic spine compatible with DJD.     5.  Hypertension: Blood pressure up-and-down, continue current medications and as needed clonidine.  2D echo showed mild LVH with grade 1 diastolic dysfunction.     6.  History of hypothyroidism on Synthroid, per primary.    7.  Hyponatremia: Secondary to water intake and renal failure will improve with each dialysis, I added 1800 cc p.o. fluid restriction per day.  Will monitor.    8.  Hyperphosphatemia: Continue phosphate binder but adjust dosage and time with meals, continue low phosphorus diet.    Discussed with dialysis staff.  Will follow.      Coreen Castro MD

## 2024-10-01 LAB
ANION GAP SERPL CALCULATED.3IONS-SCNC: 16.1 MMOL/L (ref 5–15)
BASOPHILS # BLD AUTO: 0.02 10*3/MM3 (ref 0–0.2)
BASOPHILS NFR BLD AUTO: 0.3 % (ref 0–1.5)
BUN SERPL-MCNC: 52 MG/DL (ref 8–23)
BUN/CREAT SERPL: 7.6 (ref 7–25)
CALCIUM SPEC-SCNC: 9.6 MG/DL (ref 8.6–10.5)
CHLORIDE SERPL-SCNC: 93 MMOL/L (ref 98–107)
CO2 SERPL-SCNC: 21.9 MMOL/L (ref 22–29)
CREAT SERPL-MCNC: 6.87 MG/DL (ref 0.76–1.27)
DEPRECATED RDW RBC AUTO: 52.2 FL (ref 37–54)
EGFRCR SERPLBLD CKD-EPI 2021: 7.6 ML/MIN/1.73
EOSINOPHIL # BLD AUTO: 0.07 10*3/MM3 (ref 0–0.4)
EOSINOPHIL NFR BLD AUTO: 1 % (ref 0.3–6.2)
ERYTHROCYTE [DISTWIDTH] IN BLOOD BY AUTOMATED COUNT: 15 % (ref 12.3–15.4)
GLUCOSE BLDC GLUCOMTR-MCNC: 111 MG/DL (ref 70–99)
GLUCOSE BLDC GLUCOMTR-MCNC: 193 MG/DL (ref 70–99)
GLUCOSE BLDC GLUCOMTR-MCNC: 205 MG/DL (ref 70–99)
GLUCOSE SERPL-MCNC: 216 MG/DL (ref 65–99)
HBV CORE AB SERPL QL IA: NEGATIVE
HCT VFR BLD AUTO: 29 % (ref 37.5–51)
HGB BLD-MCNC: 8.9 G/DL (ref 13–17.7)
IMM GRANULOCYTES # BLD AUTO: 0.02 10*3/MM3 (ref 0–0.05)
IMM GRANULOCYTES NFR BLD AUTO: 0.3 % (ref 0–0.5)
LYMPHOCYTES # BLD AUTO: 1.34 10*3/MM3 (ref 0.7–3.1)
LYMPHOCYTES NFR BLD AUTO: 19.5 % (ref 19.6–45.3)
MAGNESIUM SERPL-MCNC: 1.8 MG/DL (ref 1.6–2.4)
MCH RBC QN AUTO: 29.6 PG (ref 26.6–33)
MCHC RBC AUTO-ENTMCNC: 30.7 G/DL (ref 31.5–35.7)
MCV RBC AUTO: 96.3 FL (ref 79–97)
MONOCYTES # BLD AUTO: 0.84 10*3/MM3 (ref 0.1–0.9)
MONOCYTES NFR BLD AUTO: 12.2 % (ref 5–12)
NEUTROPHILS NFR BLD AUTO: 4.57 10*3/MM3 (ref 1.7–7)
NEUTROPHILS NFR BLD AUTO: 66.7 % (ref 42.7–76)
NRBC BLD AUTO-RTO: 0 /100 WBC (ref 0–0.2)
PHOSPHATE SERPL-MCNC: 4.3 MG/DL (ref 2.5–4.5)
PLATELET # BLD AUTO: 106 10*3/MM3 (ref 140–450)
PMV BLD AUTO: 11.1 FL (ref 6–12)
POTASSIUM SERPL-SCNC: 4.4 MMOL/L (ref 3.5–5.2)
RBC # BLD AUTO: 3.01 10*6/MM3 (ref 4.14–5.8)
SODIUM SERPL-SCNC: 131 MMOL/L (ref 136–145)
WBC NRBC COR # BLD AUTO: 6.86 10*3/MM3 (ref 3.4–10.8)

## 2024-10-01 PROCEDURE — 82948 REAGENT STRIP/BLOOD GLUCOSE: CPT

## 2024-10-01 PROCEDURE — 82948 REAGENT STRIP/BLOOD GLUCOSE: CPT | Performed by: STUDENT IN AN ORGANIZED HEALTH CARE EDUCATION/TRAINING PROGRAM

## 2024-10-01 PROCEDURE — 80048 BASIC METABOLIC PNL TOTAL CA: CPT | Performed by: STUDENT IN AN ORGANIZED HEALTH CARE EDUCATION/TRAINING PROGRAM

## 2024-10-01 PROCEDURE — 94799 UNLISTED PULMONARY SVC/PX: CPT

## 2024-10-01 PROCEDURE — 85025 COMPLETE CBC W/AUTO DIFF WBC: CPT | Performed by: STUDENT IN AN ORGANIZED HEALTH CARE EDUCATION/TRAINING PROGRAM

## 2024-10-01 PROCEDURE — 97535 SELF CARE MNGMENT TRAINING: CPT

## 2024-10-01 PROCEDURE — 99232 SBSQ HOSP IP/OBS MODERATE 35: CPT | Performed by: INTERNAL MEDICINE

## 2024-10-01 PROCEDURE — 84100 ASSAY OF PHOSPHORUS: CPT | Performed by: STUDENT IN AN ORGANIZED HEALTH CARE EDUCATION/TRAINING PROGRAM

## 2024-10-01 PROCEDURE — 63710000001 INSULIN LISPRO (HUMAN) PER 5 UNITS: Performed by: STUDENT IN AN ORGANIZED HEALTH CARE EDUCATION/TRAINING PROGRAM

## 2024-10-01 PROCEDURE — 83735 ASSAY OF MAGNESIUM: CPT | Performed by: STUDENT IN AN ORGANIZED HEALTH CARE EDUCATION/TRAINING PROGRAM

## 2024-10-01 RX ORDER — GUAIFENESIN 200 MG/10ML
200 LIQUID ORAL EVERY 4 HOURS PRN
Status: DISCONTINUED | OUTPATIENT
Start: 2024-10-01 | End: 2024-10-03 | Stop reason: HOSPADM

## 2024-10-01 RX ORDER — MIDODRINE HYDROCHLORIDE 5 MG/1
5 TABLET ORAL DAILY PRN
Status: DISCONTINUED | OUTPATIENT
Start: 2024-10-01 | End: 2024-10-03 | Stop reason: HOSPADM

## 2024-10-01 RX ADMIN — INSULIN LISPRO 2 UNITS: 100 INJECTION, SOLUTION INTRAVENOUS; SUBCUTANEOUS at 17:34

## 2024-10-01 RX ADMIN — ARIPIPRAZOLE 2 MG: 2 TABLET ORAL at 21:30

## 2024-10-01 RX ADMIN — ATORVASTATIN CALCIUM 40 MG: 40 TABLET, FILM COATED ORAL at 08:44

## 2024-10-01 RX ADMIN — OXYMETAZOLINE HYDROCHLORIDE 2 SPRAY: 0.5 SPRAY NASAL at 21:33

## 2024-10-01 RX ADMIN — LIDOCAINE 1 PATCH: 4 PATCH TOPICAL at 08:45

## 2024-10-01 RX ADMIN — APIXABAN 5 MG: 5 TABLET, FILM COATED ORAL at 08:44

## 2024-10-01 RX ADMIN — INSULIN LISPRO 4 UNITS: 100 INJECTION, SOLUTION INTRAVENOUS; SUBCUTANEOUS at 08:44

## 2024-10-01 RX ADMIN — APIXABAN 5 MG: 5 TABLET, FILM COATED ORAL at 21:29

## 2024-10-01 RX ADMIN — SERTRALINE HYDROCHLORIDE 100 MG: 100 TABLET ORAL at 21:30

## 2024-10-01 RX ADMIN — PANTOPRAZOLE SODIUM 40 MG: 40 TABLET, DELAYED RELEASE ORAL at 08:44

## 2024-10-01 RX ADMIN — TORSEMIDE 100 MG: 20 TABLET ORAL at 08:44

## 2024-10-01 RX ADMIN — ARIPIPRAZOLE 2 MG: 2 TABLET ORAL at 10:53

## 2024-10-01 RX ADMIN — OXYMETAZOLINE HYDROCHLORIDE 2 SPRAY: 0.5 SPRAY NASAL at 08:44

## 2024-10-01 RX ADMIN — SEVELAMER CARBONATE 1600 MG: 800 TABLET, FILM COATED ORAL at 08:45

## 2024-10-01 RX ADMIN — ACETAMINOPHEN 650 MG: 325 TABLET ORAL at 21:30

## 2024-10-01 RX ADMIN — LEVOTHYROXINE SODIUM 175 MCG: 75 TABLET ORAL at 06:42

## 2024-10-01 RX ADMIN — CARVEDILOL 25 MG: 25 TABLET, FILM COATED ORAL at 17:33

## 2024-10-01 RX ADMIN — CARVEDILOL 25 MG: 25 TABLET, FILM COATED ORAL at 08:44

## 2024-10-01 RX ADMIN — GUAIFENESIN 200 MG: 200 SOLUTION ORAL at 00:43

## 2024-10-01 RX ADMIN — SEVELAMER CARBONATE 1600 MG: 800 TABLET, FILM COATED ORAL at 17:34

## 2024-10-01 RX ADMIN — INSULIN LISPRO 2 UNITS: 100 INJECTION, SOLUTION INTRAVENOUS; SUBCUTANEOUS at 21:31

## 2024-10-01 NOTE — PLAN OF CARE
Problem: Adult Inpatient Plan of Care  Goal: Plan of Care Review  Outcome: Progressing  Flowsheets (Taken 10/1/2024 1659)  Progress: no change  Plan of Care Reviewed With: patient  Outcome Evaluation: Patient is alert to self and place. Patient has had no complaints this shift and has no complaints at this time.  Goal: Patient-Specific Goal (Individualized)  Outcome: Progressing  Goal: Absence of Hospital-Acquired Illness or Injury  Outcome: Progressing  Intervention: Identify and Manage Fall Risk  Recent Flowsheet Documentation  Taken 10/1/2024 1630 by Dianne Velarde RN  Safety Promotion/Fall Prevention:   nonskid shoes/slippers when out of bed   safety round/check completed  Taken 10/1/2024 1430 by Dianne Velarde RN  Safety Promotion/Fall Prevention:   nonskid shoes/slippers when out of bed   safety round/check completed  Taken 10/1/2024 1223 by Dianne Velarde RN  Safety Promotion/Fall Prevention:   nonskid shoes/slippers when out of bed   safety round/check completed  Taken 10/1/2024 1053 by Dianne Velarde RN  Safety Promotion/Fall Prevention:   nonskid shoes/slippers when out of bed   safety round/check completed  Taken 10/1/2024 0844 by Dianne Velarde RN  Safety Promotion/Fall Prevention:   assistive device/personal items within reach   clutter free environment maintained   fall prevention program maintained  Taken 10/1/2024 0712 by Dianne Velarde RN  Safety Promotion/Fall Prevention:   nonskid shoes/slippers when out of bed   safety round/check completed  Intervention: Prevent and Manage VTE (Venous Thromboembolism) Risk  Recent Flowsheet Documentation  Taken 10/1/2024 0844 by Dianne Velarde RN  Range of Motion: active ROM (range of motion) encouraged  Intervention: Prevent Infection  Recent Flowsheet Documentation  Taken 10/1/2024 0844 by Dianne Velarde RN  Infection Prevention:   cohorting utilized   environmental surveillance performed   equipment surfaces disinfected   hand hygiene  promoted   personal protective equipment utilized   rest/sleep promoted   single patient room provided  Goal: Optimal Comfort and Wellbeing  Outcome: Progressing  Intervention: Provide Person-Centered Care  Recent Flowsheet Documentation  Taken 10/1/2024 0844 by Dianne Velarde RN  Trust Relationship/Rapport:   care explained   choices provided   emotional support provided   questions answered   empathic listening provided   questions encouraged   reassurance provided   thoughts/feelings acknowledged  Goal: Readiness for Transition of Care  Outcome: Progressing     Problem: Pain Acute  Goal: Acceptable Pain Control and Functional Ability  Outcome: Progressing  Intervention: Prevent or Manage Pain  Recent Flowsheet Documentation  Taken 10/1/2024 0844 by Dianne Velarde RN  Medication Review/Management:   medications reviewed   high-risk medications identified     Problem: Fall Injury Risk  Goal: Absence of Fall and Fall-Related Injury  Outcome: Progressing  Intervention: Identify and Manage Contributors  Recent Flowsheet Documentation  Taken 10/1/2024 0844 by Dianne Velarde RN  Medication Review/Management:   medications reviewed   high-risk medications identified  Intervention: Promote Injury-Free Environment  Recent Flowsheet Documentation  Taken 10/1/2024 1630 by Dianne Velarde RN  Safety Promotion/Fall Prevention:   nonskid shoes/slippers when out of bed   safety round/check completed  Taken 10/1/2024 1430 by Dianne Velarde RN  Safety Promotion/Fall Prevention:   nonskid shoes/slippers when out of bed   safety round/check completed  Taken 10/1/2024 1223 by Dianne Velarde RN  Safety Promotion/Fall Prevention:   nonskid shoes/slippers when out of bed   safety round/check completed  Taken 10/1/2024 1053 by Dianne Velarde RN  Safety Promotion/Fall Prevention:   nonskid shoes/slippers when out of bed   safety round/check completed  Taken 10/1/2024 0844 by Dianne Velarde RN  Safety Promotion/Fall  Prevention:   assistive device/personal items within reach   clutter free environment maintained   fall prevention program maintained  Taken 10/1/2024 0712 by Dianne Velarde RN  Safety Promotion/Fall Prevention:   nonskid shoes/slippers when out of bed   safety round/check completed     Problem: Device-Related Complication Risk (Hemodialysis)  Goal: Safe, Effective Therapy Delivery  Outcome: Progressing  Intervention: Optimize Device Care and Function  Recent Flowsheet Documentation  Taken 10/1/2024 0844 by Dianne Velarde RN  Medication Review/Management:   medications reviewed   high-risk medications identified     Problem: Hemodynamic Instability (Hemodialysis)  Goal: Effective Tissue Perfusion  Outcome: Progressing     Problem: Infection (Hemodialysis)  Goal: Absence of Infection Signs and Symptoms  Outcome: Progressing     Problem: Skin Injury Risk Increased  Goal: Skin Health and Integrity  Outcome: Progressing  Intervention: Optimize Skin Protection  Recent Flowsheet Documentation  Taken 10/1/2024 1630 by Dianne Velarde RN  Head of Bed (HOB) Positioning: HOB elevated  Taken 10/1/2024 1430 by Dianne Velarde RN  Head of Bed (HOB) Positioning: HOB elevated  Taken 10/1/2024 1223 by Dianne Velarde RN  Head of Bed (HOB) Positioning: HOB elevated  Taken 10/1/2024 1053 by Dianne Velarde RN  Head of Bed (HOB) Positioning: HOB elevated  Taken 10/1/2024 0712 by Dianne Velarde RN  Head of Bed (HOB) Positioning: HOB elevated   Goal Outcome Evaluation:  Plan of Care Reviewed With: patient        Progress: no change  Outcome Evaluation: Patient is alert to self and place. Patient has had no complaints this shift and has no complaints at this time.

## 2024-10-01 NOTE — PROGRESS NOTES
" LOS: 2 days   Patient Care Team:  Domingo Bravo MD as PCP - General (Internal Medicine)  Josephine Warren APRN as Nurse Practitioner (Pulmonary Disease)  Virginie Lopez APRN as Nurse Practitioner (Pulmonary Disease)    Chief Complaint: ESRD    Subjective     History of Present Illness  Pt with some reported confusion and afib overnight.  Awake and alert today.  Only complaint is of back pain today  Remains oxygen    Subjective    History taken from: patient chart RN    Objective     Vital Sign Min/Max for last 24 hours  Temp  Min: 97.3 °F (36.3 °C)  Max: 98.3 °F (36.8 °C)   BP  Min: 94/78  Max: 155/73   Pulse  Min: 69  Max: 114   Resp  Min: 14  Max: 18   SpO2  Min: 92 %  Max: 97 %   Flow (L/min)  Min: 1.5  Max: 1.5   No data recorded     Flowsheet Rows      Flowsheet Row First Filed Value   Admission Height 167.6 cm (66\") Documented at 09/28/2024 0113   Admission Weight 77.2 kg (170 lb 3.1 oz) Documented at 09/28/2024 0330            No intake/output data recorded.  I/O last 3 completed shifts:  In: 120 [P.O.:120]  Out: 2000     Objective:  General Appearance:  Comfortable.    Vital signs: (most recent): Blood pressure 155/73, pulse 73, temperature 97.3 °F (36.3 °C), temperature source Oral, resp. rate 18, height 167.6 cm (66\"), weight 77.2 kg (170 lb 3.1 oz), SpO2 92%.  Vital signs are normal.    HEENT: Normal HEENT exam.    Lungs:  Normal effort and normal respiratory rate.    Heart: Normal rate.  Regular rhythm.    Abdomen: Abdomen is soft.  Bowel sounds are normal.     Extremities: Normal range of motion.    Pulses: Distal pulses are intact.    Neurological: Patient is alert and oriented to person, place and time.    Pupils:  Pupils are equal, round, and reactive to light.    Skin:  Warm and dry.                Results Review:     I reviewed the patient's new clinical results.  I reviewed the patient's new imaging results and agree with the interpretation.  I reviewed the patient's other test results and " "agree with the interpretation    WBC WBC   Date Value Ref Range Status   10/01/2024 6.86 3.40 - 10.80 10*3/mm3 Final   09/30/2024 6.59 3.40 - 10.80 10*3/mm3 Final   09/29/2024 7.49 3.40 - 10.80 10*3/mm3 Final      HGB Hemoglobin   Date Value Ref Range Status   10/01/2024 8.9 (L) 13.0 - 17.7 g/dL Final   09/30/2024 9.1 (L) 13.0 - 17.7 g/dL Final   09/29/2024 9.8 (L) 13.0 - 17.7 g/dL Final      HCT Hematocrit   Date Value Ref Range Status   10/01/2024 29.0 (L) 37.5 - 51.0 % Final   09/30/2024 29.3 (L) 37.5 - 51.0 % Final   09/29/2024 31.7 (L) 37.5 - 51.0 % Final      Platlets No results found for: \"LABPLAT\"   MCV MCV   Date Value Ref Range Status   10/01/2024 96.3 79.0 - 97.0 fL Final   09/30/2024 95.1 79.0 - 97.0 fL Final   09/29/2024 97.5 (H) 79.0 - 97.0 fL Final          Sodium Sodium   Date Value Ref Range Status   10/01/2024 131 (L) 136 - 145 mmol/L Final   09/30/2024 128 (L) 136 - 145 mmol/L Final   09/29/2024 131 (L) 136 - 145 mmol/L Final      Potassium Potassium   Date Value Ref Range Status   10/01/2024 4.4 3.5 - 5.2 mmol/L Final   09/30/2024 4.5 3.5 - 5.2 mmol/L Final   09/29/2024 4.6 3.5 - 5.2 mmol/L Final      Chloride Chloride   Date Value Ref Range Status   10/01/2024 93 (L) 98 - 107 mmol/L Final   09/30/2024 87 (L) 98 - 107 mmol/L Final   09/29/2024 90 (L) 98 - 107 mmol/L Final      CO2 CO2   Date Value Ref Range Status   10/01/2024 21.9 (L) 22.0 - 29.0 mmol/L Final   09/30/2024 22.1 22.0 - 29.0 mmol/L Final   09/29/2024 22.6 22.0 - 29.0 mmol/L Final      BUN BUN   Date Value Ref Range Status   10/01/2024 52 (H) 8 - 23 mg/dL Final   09/30/2024 93 (H) 8 - 23 mg/dL Final   09/29/2024 76 (H) 8 - 23 mg/dL Final      Creatinine Creatinine   Date Value Ref Range Status   10/01/2024 6.87 (H) 0.76 - 1.27 mg/dL Final   09/30/2024 9.93 (H) 0.76 - 1.27 mg/dL Final   09/29/2024 8.25 (H) 0.76 - 1.27 mg/dL Final      Calcium Calcium   Date Value Ref Range Status   10/01/2024 9.6 8.6 - 10.5 mg/dL Final   09/30/2024 " "9.3 8.6 - 10.5 mg/dL Final   09/29/2024 9.9 8.6 - 10.5 mg/dL Final      PO4 No results found for: \"CAPO4\"   Albumin No results found for: \"ALBUMIN\"   Magnesium Magnesium   Date Value Ref Range Status   10/01/2024 1.8 1.6 - 2.4 mg/dL Final   09/30/2024 2.0 1.6 - 2.4 mg/dL Final   09/29/2024 2.1 1.6 - 2.4 mg/dL Final      Uric Acid No results found for: \"URICACID\"     Medication Review:   apixaban, 5 mg, Oral, Q12H  ARIPiprazole, 2 mg, Oral, BID  aspirin, 324 mg, Oral, Once  atorvastatin, 40 mg, Oral, Daily  carvedilol, 25 mg, Oral, BID With Meals  [START ON 10/2/2024] epoetin trevor/trevor-epbx, 5,000 Units, Intravenous, Once per day on Monday Wednesday Friday  insulin lispro, 2-9 Units, Subcutaneous, 4x Daily AC & at Bedtime  levothyroxine, 175 mcg, Oral, Q AM  Lidocaine, 1 patch, Transdermal, Daily  oxymetazoline, 2 spray, Each Nare, BID  pantoprazole, 40 mg, Oral, Daily  sertraline, 100 mg, Oral, Nightly  sevelamer, 1,600 mg, Oral, TID With Meals  sodium chloride, 10 mL, Intravenous, Q12H  torsemide, 100 mg, Oral, Daily          Assessment & Plan       Intractable pain    Abdominal pain      Assessment & Plan  1.  ESRD: Volume status is adequate.  UF 2 kg with dialysis, will increase to 3L with next treatment.  Continue MWF schedule while here.  Was in the process of getting training to do HHD.       2.  Anemia: Hemoglobin decreased some, started EPO with dialysis follow trends     3.  History of diabetes mellitus: Treatment per primary     4.  Abdominal pain/chest pain: Further workup and treatment per primary, CT scan showed large right renal pelvis stone, unsure if contributing to his pain, currently pain-free.  MRI of lumbar and thoracic spine compatible with DJD.     5.  Hypertension: Blood pressure up-and-down, continue current medications and as needed clonidine.  2D echo showed mild LVH with grade 1 diastolic dysfunction.     6.  History of hypothyroidism on Synthroid, per primary.     7.  Hyponatremia: " Secondary to water intake and renal failure will improve with each dialysis, added 1800 cc p.o. fluid restriction per day.       8.  Hyperphosphatemia: Continue phosphate binder but adjust dosage and time with meals, continue low phosphorus diet.    Rolf Cha MD  10/01/24  08:36 EDT

## 2024-10-01 NOTE — THERAPY TREATMENT NOTE
Patient Name: Nelson Wang  : 1945    MRN: [id number]                              Today's Date: 10/1/2024       Admit Date: 2024    Visit Dx:     ICD-10-CM ICD-9-CM   1. ESRD on dialysis  N18.6 585.6    Z99.2 V45.11   2. Intractable pain  R52 780.96   3. Difficulty walking  R26.2 719.7     Patient Active Problem List   Diagnosis    Essential hypertension    Bradycardia, sinus    Anemia due to chronic kidney disease    Hypothyroidism    Idiopathic gout    Proteinuria    Psoriasis    Thrombocytopenia    Type 2 diabetes mellitus without complication    CKD (chronic kidney disease), stage IV    Hyperlipidemia    Monoclonal gammopathy of unknown significance (MGUS)    Stage 5 chronic kidney disease    Chronic gout without tophus    Peritoneal dialysis catheter dysfunction    Medicare annual wellness visit, subsequent    Chronic cough    Peritonitis associated with peritoneal dialysis    Recurrent pneumonia    Acute on chronic respiratory failure with hypoxia    End stage renal disease    Aspiration pneumonitis    Sepsis    Type 2 diabetes mellitus, with long-term current use of insulin    GERD without esophagitis    Immunosuppression due to drug therapy    Bipolar disorder    Chronic respiratory failure with hypoxia    Shortness of breath    Abnormal stress test    ESRD on dialysis    Abnormal chest CT    Encounter for screening for malignant neoplasm of colon    History of colon polyps    Family history of colon cancer    Intractable pain    Abdominal pain     Past Medical History:   Diagnosis Date    Abnormal stress test     Anemia     IRON INFUSIONS WITH DIALYSIS    Anesthesia     WITH HIP REPLACEMENT DAUGHTER BELIEVES HAD SVT     Ankle pain, right     Anxiety and depression     CKD (chronic kidney disease), stage IV     DIALYSIS [id number]-[id number]-[id number] SHILEY IN RIGHT CHEST    Diabetes     Gout     High cholesterol     History of peritoneal dialysis     HL (hearing loss)     Hyperkalemia      Hypertension     Hypothyroidism     Insomnia     Night terrors     Psoriasis     Psoriasis     Sleep walking     Thrombocytopenia     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    Vitiligo      Past Surgical History:   Procedure Laterality Date    ANKLE SURGERY Right 11/1990    APPENDECTOMY N/A 1954    ARTERIOVENOUS FISTULA/SHUNT SURGERY Left 07/16/2024    Procedure: Creation of left arm arteriovenous fistula;  Surgeon: Moses Mir MD;  Location: [facility] MAIN OR;  Service: Vascular;  Laterality: Left;    BRONCHOSCOPY N/A 03/01/2024    Procedure: BRONCHOSCOPY WITH BAL AND WASHINGS;  Surgeon: Sandra Espinal MD;  Location: [facility] ENDOSCOPY;  Service: Pulmonary;  Laterality: N/A;  PNEUMONIA    BRONCHOSCOPY N/A 08/30/2024    Procedure: BRONCHOSCOPY WITH BALS AND WASHINGS;  Surgeon: Sandra Espinal MD;  Location: [facility] ENDOSCOPY;  Service: Pulmonary;  Laterality: N/A;  MUCOUS PLUGGING    CARDIAC CATHETERIZATION N/A 05/29/2024    Procedure: Left Heart Cath with possible coronary angioplasty;  Surgeon: Stephan Nichols MD;  Location: [facility] CATH INVASIVE LOCATION;  Service: Cardiology;  Laterality: N/A;    COLONOSCOPY N/A 06/2022    [facility]    HIP BIPOLAR REPLACEMENT Right 01/2000    INSERTION HEMODIALYSIS CATHETER Left 04/09/2024    Procedure: HEMODIALYSIS CATHETER INSERTION;  Surgeon: Jose Berry MD;  Location: [facility] MAIN OR;  Service: General;  Laterality: Left;    INSERTION HEMODIALYSIS CATHETER N/A 04/12/2024    Procedure: Tunneled hemodialysis catheter insertion;  Surgeon: Enrique Vinson MD;  Location: [facility] MAIN OR;  Service: Vascular;  Laterality: N/A;    INSERTION PERITONEAL DIALYSIS CATHETER N/A 03/27/2023    Procedure: LAPAROSCOPIC INSERTION PERITONEAL DIALYSIS CATHETER, LAPAROSCOPIC OMENTOPEXY WITH LYSIS OF ADHESIONS;  Surgeon: Jose Berry MD;  Location: [facility] OR;  Service: General;  Laterality: N/A;    INSERTION PERITONEAL DIALYSIS CATHETER Left 07/23/2023     Procedure: REVISION OF PERITONEAL DIALYSIS CATHETER;  Surgeon: Radha Oreilly MD;  Location: [facility] MAIN OR;  Service: General;  Laterality: Left;    REMOVAL PERITONEAL DIALYSIS CATHETER N/A 04/09/2024    Procedure: REMOVAL PERITONEAL DIALYSIS CATHETER;  Surgeon: Jose Berry MD;  Location: [facility] MAIN OR;  Service: General;  Laterality: N/A;    RENAL BIOPSY Left 07/15/2022    UPPER GASTROINTESTINAL ENDOSCOPY      WRIST SURGERY      UNSURE WHICH SIDE DAUGHTER REPORTS HAD SEVERE  CUT FROM WINDOW AND THEY HAD TO DO RECONSTRUCTIVE SURGERY WITH VESSELS AND NERVES      General Information       Row Name 10/01/24 0906          OT Time and Intention    Document Type therapy note (daily note)  -PG     Mode of Treatment individual therapy;occupational therapy  -PG               User Key  (r) = Recorded By, (t) = Taken By, (c) = Cosigned By      Initials Name Provider Type    PG Chauncey Goldberg OT Occupational Therapist                     Mobility/ADL's       Row Name 10/01/24 0906          Transfers    Transfers toilet transfer;bed-chair transfer  -PG       Row Name 10/01/24 0906          Bed-Chair Transfer    Assistive Device (Bed-Chair Transfers) walker, front-wheeled  -PG       Row Name 10/01/24 0906          Toilet Transfer    Type (Toilet Transfer) sit-stand;stand-sit  -PG     [place] Level (Toilet Transfer) contact guard;verbal cues  -PG       Row Name 10/01/24 0906          Toileting Assessment/Training    [place] Level (Toileting) toileting skills;standby assist  -PG               User Key  (r) = Recorded By, (t) = Taken By, (c) = Cosigned By      Initials Name Provider Type    PG Chauncey Goldberg OT Occupational Therapist                   Obj/Interventions    No documentation.                  Goals/Plan    No documentation.                  Clinical Impression       Row Name 10/01/24 0907          Plan of Care Review    Progress no change  -PG               User Key  (r) = Recorded By, (t)  = Taken By, (c) = Cosigned By      Initials Name Provider Type    PG Chauncey Goldberg OT Occupational Therapist                   Outcome Measures       Row Name 10/01/24 0907          How much help from another is currently needed...    Putting on and taking off regular lower body clothing? 3  -PG     Bathing (including washing, rinsing, and drying) 3  -PG     Toileting (which includes using toilet bed pan or urinal) 3  -PG     Putting on and taking off regular upper body clothing 3  -PG     Taking care of personal grooming (such as brushing teeth) 3  -PG     Eating meals 4  -PG     AM-PAC 6 Clicks Score (OT) 19  -PG       Row Name 10/01/24 0907          Functional Assessment    Outcome Measure Options AM-PAC 6 Clicks Daily Activity (OT);Optimal Instrument  -PG       Row Name 10/01/24 0907          Optimal Instrument    Optimal Instrument Optimal - 3  -PG     Bending/Stooping 2  -PG     Standing 2  -PG     Reaching 1  -PG               User Key  (r) = Recorded By, (t) = Taken By, (c) = Cosigned By      Initials Name Provider Type    PG Chauncey Goldberg OT Occupational Therapist                    Occupational Therapy Education       Title: PT OT SLP Therapies (In Progress)       Topic: Occupational Therapy (In Progress)       Point: ADL training (In Progress)       Description:   Instruct learner(s) on proper safety adaptation and remediation techniques during self care or transfers.   Instruct in proper use of assistive devices.                  Learning Progress Summary             Patient Acceptance, E,D, NR by PG at 9/30/2024 1018                         Point: Home exercise program (In Progress)       Description:   Instruct learner(s) on appropriate technique for monitoring, assisting and/or progressing therapeutic exercises/activities.                  Learning Progress Summary             Patient Acceptance, E,D, NR by PG at 9/30/2024 1018                         Point: Precautions (In Progress)        Description:   Instruct learner(s) on prescribed precautions during self-care and functional transfers.                  Learning Progress Summary             Patient Acceptance, E,D, NR by PG at 9/30/2024 1018                         Point: Body mechanics (In Progress)       Description:   Instruct learner(s) on proper positioning and spine alignment during self-care, functional mobility activities and/or exercises.                  Learning Progress Summary             Patient Acceptance, E,D, NR by PG at 9/30/2024 1018                                         User Key       Initials Effective Dates Name Provider Type Discipline     06/16/21 -  Chauncey Goldberg OT Occupational Therapist OT                  OT Recommendation and Plan  Planned Therapy Interventions (OT): activity tolerance training, BADL retraining, strengthening exercise, transfer/mobility retraining, patient/caregiver education/training, occupation/activity based interventions  Therapy Frequency (OT): 5 times/wk  Plan of Care Review  Plan of Care Reviewed With: patient  Progress: no change  Outcome Evaluation: Patient presents with limitations affecting strength, activity tolerance, and balance impacting patient's ability to return home safely and independently.  The skills of a therapist will be required to safely and effectively implement the following treatment plan to restore maximal level of function     Time Calculation:   Evaluation Complexity (OT)  Review Occupational Profile/Medical/Therapy History Complexity: brief/low complexity  Assessment, Occupational Performance/Identification of Deficit Complexity: 3-5 performance deficits  Clinical Decision Making Complexity (OT): problem focused assessment/low complexity  Overall Complexity of Evaluation (OT): low complexity     Time Calculation- OT       Row Name 10/01/24 0908             Time Calculation- OT    OT Received On 10/01/24  -PG      OT Goal Re-Cert Due Date 10/09/24  -PG          Timed Charges    07655 - OT Therapeutic Activity Minutes 5  -PG      74222 - OT Self Care/Mgmt Minutes 8  -PG         Total Minutes    Timed Charges Total Minutes 13  -PG       Total Minutes 13  -PG                User Key  (r) = Recorded By, (t) = Taken By, (c) = Cosigned By      Initials Name Provider Type    PG Chauncey Goldberg OT Occupational Therapist                  Therapy Charges for Today       Code Description Service Date Service Provider Modifiers Qty    [id number]  OT EVAL LOW COMPLEXITY 3 9/30/2024 Chauncey Goldberg OT GO 1    [id number]  OT SELF CARE/MGMT/TRAIN EA 15 MIN 10/1/2024 Chauncey Goldberg OT GO 1                 Chauncey Goldberg OT  10/1/2024

## 2024-10-01 NOTE — PROGRESS NOTES
Was able to reorient Mr Wang and assist him to bed. Pt complained of back pain, and stated that he needed help getting up from couch. Patient proceeded to take morning medication successfully.

## 2024-10-01 NOTE — SIGNIFICANT NOTE
10/01/24 1404   Physical Therapy Time and Intention   Session Not Performed patient/family declined treatment;other (see comments)  (pt wanted to continue napping)

## 2024-10-01 NOTE — PLAN OF CARE
Goal Outcome Evaluation:  Plan of Care Reviewed With: patient           Outcome Evaluation: Patient remained very confused, and required frequent reorientation at bedside. Daugter remained at bedside, which helped orient and remain calm thrugh the night.Per request, Telemetry was discontinued to prevent agitation.

## 2024-10-01 NOTE — PROGRESS NOTES
[facility]   Hospitalist Progress Note  Date: 10/1/2024  Patient Name: Nelson Wang  : 1945  MRN: [id number]  Date of admission: 2024  Room/Bed: [facility]      Subjective   Subjective     Chief Complaint: epigastric pain    Summary:Nelson Wang is a 79 y.o. male with history of diabetes, ESRD on HD, psoriasis, hypertension, dyslipidemia, mild CAD, restrictive lung disease, vertigo, GERD without esophagitis, E. coli bacteremia with septic shock, C. difficile colitis and previous tobacco use who presented with chest pain and abdominal pain. Pain was worsening throughout the day and aggravated with movement. CT abdomen and pelvis along with chest was performed on presentation which showed Interval resolution of previously described 10 mm left lower lobe nodule, some mild atelectasis; colonic diverticulosis without diverticulitis. Surgically absent appendix; Large calculus is again identified in the right renal pelvis; bilateral renal cortical thinning with bilateral renal cysts. Also showed lumbar degenerative disease with chronic compression fractures at L3 and T12 as well as at T10. Patient was started on pain medications and admitted for further evaluation and management. Obtaining TTE to rule out pericarditis though less possibility. Has chronic compression fracture but also having back pain; MRI of thoracic and lumbar region showed degenerative changes and old T12 and L3 compression fracture; nothing acute. Nephrology following, plan for HD today.    Interval Followup: No acute events overnight, still with some intermittent confusion, patient still short of breath requiring supplemental oxygen, chest x-ray reviewed consistent with pulmonary edema      Objective   Objective     Vitals:   Temp:  [97.3 °F (36.3 °C)-98.3 °F (36.8 °C)] 97.3 °F (36.3 °C)  Heart Rate:  [] 71  Resp:  [16-18] 18  BP: ()/(44-78) 152/73  Flow (L/min):  [1.5] 1.5    Physical Exam   GEN: No acute distress  HEENT:  Moist mucous membranes  LUNGS: Equal chest rise bilaterally  CARDIAC: Regular rate and rhythm  NEURO: Moving all 4 extremities spontaneously  SKIN: No obvious breakdown      Result Review    Result Review:  I have personally reviewed these results:  [x]  Laboratory      Lab 10/01/24  0537 09/30/24  0744 09/30/24  0606 09/29/24  0551 09/28/24  1042 09/28/24  0400   WBC 6.86  --  6.59 7.49  --  7.95   HEMOGLOBIN 8.9*  --  9.1* 9.8*  --  10.8*   HEMATOCRIT 29.0*  --  29.3* 31.7*  --  34.6*   PLATELETS 106*  --  105* 100*  --  103*   NEUTROS ABS 4.57  --  4.67 4.96  --  6.03   IMMATURE GRANS (ABS) 0.02  --  0.04 0.04  --  0.05   LYMPHS ABS 1.34  --  1.15 1.51  --  1.06   MONOS ABS 0.84  --  0.65 0.90  --  0.78   EOS ABS 0.07  --  0.07 0.05  --  0.02   MCV 96.3  --  95.1 97.5*  --  95.6   SED RATE  --   --   --   --   --  75*   CRP  --   --   --   --   --  4.44*   PROCALCITONIN  --  0.78*  --   --   --   --    LACTATE  --   --   --   --  1.6  --          Lab 10/01/24  0537 09/30/24  0606 09/29/24  0551   SODIUM 131* 128* 131*   POTASSIUM 4.4 4.5 4.6   CHLORIDE 93* 87* 90*   CO2 21.9* 22.1 22.6   ANION GAP 16.1* 18.9* 18.4*   BUN 52* 93* 76*   CREATININE 6.87* 9.93* 8.25*   EGFR 7.6* 4.9* 6.1*   GLUCOSE 216* 191* 139*   CALCIUM 9.6 9.3 9.9   MAGNESIUM 1.8 2.0 2.1   PHOSPHORUS 4.3 6.7* 6.1*         Lab 09/28/24  0126 09/27/24  1655   TOTAL PROTEIN 7.8 7.6   ALBUMIN 4.1 4.2   GLOBULIN 3.7 3.4   ALT (SGPT) 12 12   AST (SGOT) 14 15   BILIRUBIN 0.7 0.6   ALK PHOS 79 95   LIPASE 34  --          Lab 09/28/24  0400 09/28/24  0126 09/27/24  1655   PROBNP 3,698.0* 3,555.0*  --    HSTROP T 75* 74* 73*                 Brief Urine Lab Results  (Last result in the past 365 days)        Color   Clarity   Blood   Leuk Est   Nitrite   Protein   CREAT   Urine HCG        04/09/24 0412 Yellow   Clear   Small (1+)   Negative   Negative   100 mg/dL (2+)                 [x]  Microbiology   Microbiology Results (last 10 days)       Procedure  Component Value - Date/Time    Respiratory Panel PCR w/COVID-19(SARS-CoV-2) RA/TANIA/GAVIN/PAD/[facility] In-House, NP Swab in UTM/VTM, 2 HR TAT - Swab, Nasopharynx [[id number]]  (Normal) Collected: 09/28/24 1453    Lab Status: Final result Specimen: Swab from Nasopharynx Updated: 09/28/24 1549     ADENOVIRUS, PCR Not Detected     Coronavirus 229E Not Detected     Coronavirus HKU1 Not Detected     Coronavirus NL63 Not Detected     Coronavirus OC43 Not Detected     COVID19 Not Detected     Human Metapneumovirus Not Detected     Human Rhinovirus/Enterovirus Not Detected     Influenza A PCR Not Detected     Influenza B PCR Not Detected     Parainfluenza Virus 1 Not Detected     Parainfluenza Virus 2 Not Detected     Parainfluenza Virus 3 Not Detected     Parainfluenza Virus 4 Not Detected     RSV, PCR Not Detected     Bordetella pertussis pcr Not Detected     Bordetella parapertussis PCR Not Detected     Chlamydophila pneumoniae PCR Not Detected     Mycoplasma pneumo by PCR Not Detected    Narrative:      In the setting of a positive respiratory panel with a viral infection PLUS a negative procalcitonin without other underlying concern for bacterial infection, consider observing off antibiotics or discontinuation of antibiotics and continue supportive care. If the respiratory panel is positive for atypical bacterial infection (Bordetella pertussis, Chlamydophila pneumoniae, or Mycoplasma pneumoniae), consider antibiotic de-escalation to target atypical bacterial infection.    COVID-19, FLU A/B, RSV PCR 1 HR TAT - Swab, Nasopharynx [[id number]]  (Normal) Collected: 09/27/24 1715    Lab Status: Final result Specimen: Swab from Nasopharynx Updated: 09/27/24 1757     COVID19 Not Detected     Influenza A PCR Not Detected     Influenza B PCR Not Detected     RSV, PCR Not Detected    Narrative:      Fact sheet for providers: https://www.fda.gov/media/033840/download    Fact sheet for patients:  https://www.fda.gov/media/686897/download    Test performed by PCR.          [x]  Radiology  XR Chest 1 View    Result Date: 9/30/2024  Impression: Findings of pulmonary edema with trace pleural effusions. Electronically Signed: Solange Iverson MD  9/30/2024 11:50 AM EDT  Workstation ID: [id number]    MRI Lumbar Spine Without Contrast    Result Date: 9/29/2024  Impression:  1. Old L3 compression fracture with mild retropulsion. No acute fractures are identified.  2. Degenerative change throughout the lumbar spine with areas of mild/moderate central canal and foraminal stenosis.    Electronically Signed By-EPIFANIO RENTERIA MD On:9/29/2024 12:42 PM      MRI Thoracic Spine Without Contrast    Result Date: 9/29/2024  Impression: Degenerative change. No acute findings.    Electronically Signed By-EPIFANIO RENTERIA MD On:9/29/2024 12:33 PM      CT Abdomen Pelvis Without Contrast    Result Date: 9/28/2024  1. No bowel obstruction or evidence of colitis. There is colonic diverticulosis without diverticulitis. Surgically absent appendix. 2. Large calculus is again identified in the right renal pelvis, not significantly changed. No hydronephrosis on either side. There is bilateral renal cortical thinning with bilateral renal cysts. 3. Interval removal of a peritoneal dialysis catheter. Electronically Signed: Cristian Tiwari MD  9/28/2024 2:29 AM EDT  Workstation ID: [id number]    CT Chest Without Contrast Diagnostic    Result Date: 9/28/2024  1. Interval resolution of previously described 10 mm left lower lobe nodule, presumed infectious or inflammatory. 2. Groundglass changes in the lower lobes, most likely secondary to some mild atelectasis. Pneumonitis is considered less likely. No dense consolidations. No clear evidence of volume overload. 3. Cardiomegaly with atherosclerotic disease and coronary artery disease. Electronically Signed: Cristian Tiwari MD  9/28/2024 2:17 AM EDT  Workstation ID: [id number]    XR Chest 1 View    Result Date:  9/28/2024  No acute cardiopulmonary findings. Electronically Signed: Cristian Tiwari MD  9/28/2024 1:33 AM EDT  Workstation ID: [id number]    XR Chest 1 View    Result Date: 9/27/2024  Impression: No acute cardiopulmonary findings. Electronically Signed: Claus Sánchez MD  9/27/2024 5:44 PM EDT  Workstation ID: [id number]   []  EKG/Telemetry   []  Cardiology/Vascular   []  Pathology  []  Old records  []  Other:    Assessment & Plan   Assessment / Plan     Assessment:  Epigastric pain, improving  Intractable Back pain, stable  New onst Atrial fibrillation; [id number] of 4  Hypoxia, likely due to fluid overload/pulmonary edema  History of diabetes mellitus  ESRD on HD  Psoriasis  Hypertension  Dyslipidemia  Mild CAD  Restrictive lung disease  Vertigo  GERD without esophagitis      Plan:  Patient currently being managed in medicine service.  Supportive management.  Discontinue IV Dilaudid  Had cardiac cath recently; showed mild CAD.  Elevated troponin but could be in the setting of ESRD; better than prior. EKG non significant.  Transthoracic echo was non significant.  Found to have Atrial fibrillation in telemetry; new onset.  Started on PO Eliquis 5mg BID.  Continue Coreg.  Continue to titrate O2 to maintain SpO2>90%.  CT scan showing lumbar degenerative disease with chronic compression fractures at L3 and T12 as well as at T10. Pain could be related to it.-IF persistent, obtaining MRI of thoracic and lumbar spine.  Nephrologist consulted; underwent hemodialysis yesterday, no plans for dialysis today, increasing ultrafiltration tomorrow to 3 L  Continue rest of the current management.  Clinical course to determine disposition; likely in next 24 hours to home.     Discussed with RN.    VTE Prophylaxis:  Pharmacologic & mechanical VTE prophylaxis orders are present.        CODE STATUS:   Code Status (Patient has no pulse and is not breathing): CPR (Attempt to Resuscitate)  Medical Interventions (Patient has pulse or is  breathing): Full Support      Electronically signed by Dayton Marcus MD, 10/1/2024, 13:32 EDT.

## 2024-10-01 NOTE — SIGNIFICANT NOTE
10/01/24 1045   Coping/Psychosocial   Observed Emotional State calm;cooperative   Verbalized Emotional State hopefulness   Trust Relationship/Rapport empathic listening provided   Involvement in Care interacting with patient   Additional Documentation Spiritual Care (Group)   Spiritual Care   Use of Spiritual Resources non-Church use of spiritual care   Spiritual Care Source  initiative   Spiritual Care Follow-Up follow-up, none required as presently assessed   Response to Spiritual Care receptive of support   Spiritual Care Interventions supportive conversation provided   Spiritual Care Visit Type initial   Receptivity to Spiritual Care visit welcomed

## 2024-10-02 ENCOUNTER — TELEPHONE (OUTPATIENT)
Dept: GASTROENTEROLOGY | Facility: CLINIC | Age: 79
End: 2024-10-02
Payer: MEDICARE

## 2024-10-02 LAB
ALBUMIN SERPL-MCNC: 3.4 G/DL (ref 3.5–5.2)
ALBUMIN/GLOB SERPL: 0.9 G/DL
ALP SERPL-CCNC: 67 U/L (ref 39–117)
ALT SERPL W P-5'-P-CCNC: 18 U/L (ref 1–41)
ANION GAP SERPL CALCULATED.3IONS-SCNC: 19.6 MMOL/L (ref 5–15)
AST SERPL-CCNC: 28 U/L (ref 1–40)
BASOPHILS # BLD AUTO: 0.02 10*3/MM3 (ref 0–0.2)
BASOPHILS NFR BLD AUTO: 0.4 % (ref 0–1.5)
BILIRUB SERPL-MCNC: 0.5 MG/DL (ref 0–1.2)
BUN SERPL-MCNC: 70 MG/DL (ref 8–23)
BUN/CREAT SERPL: 8.6 (ref 7–25)
CALCIUM SPEC-SCNC: 9.6 MG/DL (ref 8.6–10.5)
CHLORIDE SERPL-SCNC: 90 MMOL/L (ref 98–107)
CO2 SERPL-SCNC: 18.4 MMOL/L (ref 22–29)
CREAT SERPL-MCNC: 8.11 MG/DL (ref 0.76–1.27)
DEPRECATED RDW RBC AUTO: 49.8 FL (ref 37–54)
EGFRCR SERPLBLD CKD-EPI 2021: 6.2 ML/MIN/1.73
EOSINOPHIL # BLD AUTO: 0.15 10*3/MM3 (ref 0–0.4)
EOSINOPHIL NFR BLD AUTO: 2.7 % (ref 0.3–6.2)
ERYTHROCYTE [DISTWIDTH] IN BLOOD BY AUTOMATED COUNT: 14.6 % (ref 12.3–15.4)
GLOBULIN UR ELPH-MCNC: 4 GM/DL
GLUCOSE BLDC GLUCOMTR-MCNC: 116 MG/DL (ref 70–99)
GLUCOSE BLDC GLUCOMTR-MCNC: 139 MG/DL (ref 70–99)
GLUCOSE BLDC GLUCOMTR-MCNC: 142 MG/DL (ref 70–99)
GLUCOSE BLDC GLUCOMTR-MCNC: 174 MG/DL (ref 70–99)
GLUCOSE SERPL-MCNC: 147 MG/DL (ref 65–99)
HCT VFR BLD AUTO: 29.2 % (ref 37.5–51)
HGB BLD-MCNC: 9.2 G/DL (ref 13–17.7)
IMM GRANULOCYTES # BLD AUTO: 0.04 10*3/MM3 (ref 0–0.05)
IMM GRANULOCYTES NFR BLD AUTO: 0.7 % (ref 0–0.5)
LYMPHOCYTES # BLD AUTO: 1.07 10*3/MM3 (ref 0.7–3.1)
LYMPHOCYTES NFR BLD AUTO: 19 % (ref 19.6–45.3)
MAGNESIUM SERPL-MCNC: 2.1 MG/DL (ref 1.6–2.4)
MCH RBC QN AUTO: 29.8 PG (ref 26.6–33)
MCHC RBC AUTO-ENTMCNC: 31.5 G/DL (ref 31.5–35.7)
MCV RBC AUTO: 94.5 FL (ref 79–97)
MONOCYTES # BLD AUTO: 0.73 10*3/MM3 (ref 0.1–0.9)
MONOCYTES NFR BLD AUTO: 13 % (ref 5–12)
NEUTROPHILS NFR BLD AUTO: 3.62 10*3/MM3 (ref 1.7–7)
NEUTROPHILS NFR BLD AUTO: 64.2 % (ref 42.7–76)
NRBC BLD AUTO-RTO: 0 /100 WBC (ref 0–0.2)
PHOSPHATE SERPL-MCNC: 6.1 MG/DL (ref 2.5–4.5)
PLATELET # BLD AUTO: 105 10*3/MM3 (ref 140–450)
PMV BLD AUTO: 11.3 FL (ref 6–12)
POTASSIUM SERPL-SCNC: 4.4 MMOL/L (ref 3.5–5.2)
PROT SERPL-MCNC: 7.4 G/DL (ref 6–8.5)
RBC # BLD AUTO: 3.09 10*6/MM3 (ref 4.14–5.8)
SODIUM SERPL-SCNC: 128 MMOL/L (ref 136–145)
WBC NRBC COR # BLD AUTO: 5.63 10*3/MM3 (ref 3.4–10.8)

## 2024-10-02 PROCEDURE — 25010000002 EPOETIN ALFA PER 1000 UNITS: Performed by: INTERNAL MEDICINE

## 2024-10-02 PROCEDURE — 80053 COMPREHEN METABOLIC PANEL: CPT | Performed by: INTERNAL MEDICINE

## 2024-10-02 PROCEDURE — 84100 ASSAY OF PHOSPHORUS: CPT | Performed by: STUDENT IN AN ORGANIZED HEALTH CARE EDUCATION/TRAINING PROGRAM

## 2024-10-02 PROCEDURE — 99232 SBSQ HOSP IP/OBS MODERATE 35: CPT | Performed by: INTERNAL MEDICINE

## 2024-10-02 PROCEDURE — 82948 REAGENT STRIP/BLOOD GLUCOSE: CPT

## 2024-10-02 PROCEDURE — 85025 COMPLETE CBC W/AUTO DIFF WBC: CPT | Performed by: STUDENT IN AN ORGANIZED HEALTH CARE EDUCATION/TRAINING PROGRAM

## 2024-10-02 PROCEDURE — 83735 ASSAY OF MAGNESIUM: CPT | Performed by: STUDENT IN AN ORGANIZED HEALTH CARE EDUCATION/TRAINING PROGRAM

## 2024-10-02 RX ORDER — ONDANSETRON 4 MG/1
4 TABLET, ORALLY DISINTEGRATING ORAL EVERY 6 HOURS PRN
Status: DISCONTINUED | OUTPATIENT
Start: 2024-10-02 | End: 2024-10-03 | Stop reason: HOSPADM

## 2024-10-02 RX ADMIN — ATORVASTATIN CALCIUM 40 MG: 40 TABLET, FILM COATED ORAL at 13:56

## 2024-10-02 RX ADMIN — APIXABAN 5 MG: 5 TABLET, FILM COATED ORAL at 20:22

## 2024-10-02 RX ADMIN — LIDOCAINE 1 PATCH: 4 PATCH TOPICAL at 13:54

## 2024-10-02 RX ADMIN — ARIPIPRAZOLE 2 MG: 2 TABLET ORAL at 20:22

## 2024-10-02 RX ADMIN — OXYMETAZOLINE HYDROCHLORIDE 2 SPRAY: 0.5 SPRAY NASAL at 20:25

## 2024-10-02 RX ADMIN — CARVEDILOL 25 MG: 25 TABLET, FILM COATED ORAL at 18:21

## 2024-10-02 RX ADMIN — PANTOPRAZOLE SODIUM 40 MG: 40 TABLET, DELAYED RELEASE ORAL at 13:55

## 2024-10-02 RX ADMIN — EPOETIN ALFA 5000 UNITS: 3000 SOLUTION INTRAVENOUS; SUBCUTANEOUS at 11:15

## 2024-10-02 RX ADMIN — Medication 1 APPLICATION: at 05:07

## 2024-10-02 RX ADMIN — ACETAMINOPHEN 650 MG: 325 TABLET ORAL at 20:22

## 2024-10-02 RX ADMIN — SERTRALINE HYDROCHLORIDE 100 MG: 100 TABLET ORAL at 20:22

## 2024-10-02 RX ADMIN — SEVELAMER CARBONATE 1600 MG: 800 TABLET, FILM COATED ORAL at 18:21

## 2024-10-02 RX ADMIN — LEVOTHYROXINE SODIUM 175 MCG: 75 TABLET ORAL at 05:03

## 2024-10-02 RX ADMIN — TORSEMIDE 100 MG: 20 TABLET ORAL at 13:56

## 2024-10-02 NOTE — PROGRESS NOTES
[facility]   Hospitalist Progress Note  Date: 10/2/2024  Patient Name: Nelson Wang  : 1945  MRN: [id number]  Date of admission: 2024  Room/Bed: [facility]      Subjective   Subjective     Chief Complaint: epigastric pain    Summary:Nelson Wang is a 79 y.o. male with history of diabetes, ESRD on HD, psoriasis, hypertension, dyslipidemia, mild CAD, restrictive lung disease, vertigo, GERD without esophagitis, E. coli bacteremia with septic shock, C. difficile colitis and previous tobacco use who presented with chest pain and abdominal pain. Pain was worsening throughout the day and aggravated with movement. CT abdomen and pelvis along with chest was performed on presentation which showed Interval resolution of previously described 10 mm left lower lobe nodule, some mild atelectasis; colonic diverticulosis without diverticulitis. Surgically absent appendix; Large calculus is again identified in the right renal pelvis; bilateral renal cortical thinning with bilateral renal cysts. Also showed lumbar degenerative disease with chronic compression fractures at L3 and T12 as well as at T10. Patient was started on pain medications and admitted for further evaluation and management. Obtaining TTE to rule out pericarditis though less possibility. Has chronic compression fracture but also having back pain; MRI of thoracic and lumbar region showed degenerative changes and old T12 and L3 compression fracture; nothing acute. Nephrology following, plan for HD today.    Interval Followup: Patient with some increased confusion overnight, patient scheduled for dialysis today, patient seen prior to his dialysis session, did discuss case with Dr. Castro's, they are planning on taking 3 L off during dialysis, possible repeat dialysis tomorrow for more fluid removal      Objective   Objective     Vitals:   Temp:  [97.7 °F (36.5 °C)-98.1 °F (36.7 °C)] 97.8 °F (36.6 °C)  Heart Rate:  [65-75] 75  Resp:  [16-18] 16  BP:  (124-178)/(59-85) 163/81  Flow (L/min):  [1.5] 1.5    Physical Exam   GEN: No acute distress  HEENT: Moist mucous membranes  LUNGS: Equal chest rise bilaterally  CARDIAC: Regular rate and rhythm  NEURO: Moving all 4 extremities spontaneously  SKIN: No obvious breakdown      Result Review    Result Review:  I have personally reviewed these results:  [x]  Laboratory      Lab 10/02/24  0526 10/01/24  0537 09/30/24  0744 09/30/24  0606 09/29/24  0551 09/28/24  1042 09/28/24  0400   WBC 5.63 6.86  --  6.59   < >  --  7.95   HEMOGLOBIN 9.2* 8.9*  --  9.1*   < >  --  10.8*   HEMATOCRIT 29.2* 29.0*  --  29.3*   < >  --  34.6*   PLATELETS 105* 106*  --  105*   < >  --  103*   NEUTROS ABS 3.62 4.57  --  4.67   < >  --  6.03   IMMATURE GRANS (ABS) 0.04 0.02  --  0.04   < >  --  0.05   LYMPHS ABS 1.07 1.34  --  1.15   < >  --  1.06   MONOS ABS 0.73 0.84  --  0.65   < >  --  0.78   EOS ABS 0.15 0.07  --  0.07   < >  --  0.02   MCV 94.5 96.3  --  95.1   < >  --  95.6   SED RATE  --   --   --   --   --   --  75*   CRP  --   --   --   --   --   --  4.44*   PROCALCITONIN  --   --  0.78*  --   --   --   --    LACTATE  --   --   --   --   --  1.6  --     < > = values in this interval not displayed.         Lab 10/02/24  0526 10/01/24  0537 09/30/24  0606   SODIUM 128* 131* 128*   POTASSIUM 4.4 4.4 4.5   CHLORIDE 90* 93* 87*   CO2 18.4* 21.9* 22.1   ANION GAP 19.6* 16.1* 18.9*   BUN 70* 52* 93*   CREATININE 8.11* 6.87* 9.93*   EGFR 6.2* 7.6* 4.9*   GLUCOSE 147* 216* 191*   CALCIUM 9.6 9.6 9.3   MAGNESIUM 2.1 1.8 2.0   PHOSPHORUS 6.1* 4.3 6.7*         Lab 10/02/24  0526 09/28/24  0126 09/27/24  1655   TOTAL PROTEIN 7.4 7.8 7.6   ALBUMIN 3.4* 4.1 4.2   GLOBULIN 4.0 3.7 3.4   ALT (SGPT) 18 12 12   AST (SGOT) 28 14 15   BILIRUBIN 0.5 0.7 0.6   ALK PHOS 67 79 95   LIPASE  --  34  --          Lab 09/28/24  0400 09/28/24  0126 09/27/24  1655   PROBNP 3,698.0* 3,555.0*  --    HSTROP T 75* 74* 73*                 Brief Urine Lab Results  (Last  result in the past 365 days)        Color   Clarity   Blood   Leuk Est   Nitrite   Protein   CREAT   Urine HCG        04/09/24 0412 Yellow   Clear   Small (1+)   Negative   Negative   100 mg/dL (2+)                 [x]  Microbiology   Microbiology Results (last 10 days)       Procedure Component Value - Date/Time    Respiratory Panel PCR w/COVID-19(SARS-CoV-2) RA/[facility] In-House, NP Swab in UTM/VTM, 2 HR TAT - Swab, Nasopharynx [[id number]]  (Normal) Collected: 09/28/24 1453    Lab Status: Final result Specimen: Swab from Nasopharynx Updated: 09/28/24 1549     ADENOVIRUS, PCR Not Detected     Coronavirus 229E Not Detected     Coronavirus HKU1 Not Detected     Coronavirus NL63 Not Detected     Coronavirus OC43 Not Detected     COVID19 Not Detected     Human Metapneumovirus Not Detected     Human Rhinovirus/Enterovirus Not Detected     Influenza A PCR Not Detected     Influenza B PCR Not Detected     Parainfluenza Virus 1 Not Detected     Parainfluenza Virus 2 Not Detected     Parainfluenza Virus 3 Not Detected     Parainfluenza Virus 4 Not Detected     RSV, PCR Not Detected     Bordetella pertussis pcr Not Detected     Bordetella parapertussis PCR Not Detected     Chlamydophila pneumoniae PCR Not Detected     Mycoplasma pneumo by PCR Not Detected    Narrative:      In the setting of a positive respiratory panel with a viral infection PLUS a negative procalcitonin without other underlying concern for bacterial infection, consider observing off antibiotics or discontinuation of antibiotics and continue supportive care. If the respiratory panel is positive for atypical bacterial infection (Bordetella pertussis, Chlamydophila pneumoniae, or Mycoplasma pneumoniae), consider antibiotic de-escalation to target atypical bacterial infection.    COVID-19, FLU A/B, RSV PCR 1 HR TAT - Swab, Nasopharynx [[id number]]  (Normal) Collected: 09/27/24 1715    Lab Status: Final result Specimen: Swab from Nasopharynx Updated:  09/27/24 1757     COVID19 Not Detected     Influenza A PCR Not Detected     Influenza B PCR Not Detected     RSV, PCR Not Detected    Narrative:      Fact sheet for providers: https://www.fda.gov/media/896872/download    Fact sheet for patients: https://www.fda.gov/media/870943/download    Test performed by PCR.          [x]  Radiology  XR Chest 1 View    Result Date: 9/30/2024  Impression: Findings of pulmonary edema with trace pleural effusions. Electronically Signed: Solange Iverson MD  9/30/2024 11:50 AM EDT  Workstation ID: [id number]    MRI Lumbar Spine Without Contrast    Result Date: 9/29/2024  Impression:  1. Old L3 compression fracture with mild retropulsion. No acute fractures are identified.  2. Degenerative change throughout the lumbar spine with areas of mild/moderate central canal and foraminal stenosis.    Electronically Signed By-EPIFANIO RENTERIA MD On:9/29/2024 12:42 PM      MRI Thoracic Spine Without Contrast    Result Date: 9/29/2024  Impression: Degenerative change. No acute findings.    Electronically Signed By-EPIFANIO RENTERIA MD On:9/29/2024 12:33 PM      CT Abdomen Pelvis Without Contrast    Result Date: 9/28/2024  1. No bowel obstruction or evidence of colitis. There is colonic diverticulosis without diverticulitis. Surgically absent appendix. 2. Large calculus is again identified in the right renal pelvis, not significantly changed. No hydronephrosis on either side. There is bilateral renal cortical thinning with bilateral renal cysts. 3. Interval removal of a peritoneal dialysis catheter. Electronically Signed: Cristian Tiwari MD  9/28/2024 2:29 AM EDT  Workstation ID: [id number]    CT Chest Without Contrast Diagnostic    Result Date: 9/28/2024  1. Interval resolution of previously described 10 mm left lower lobe nodule, presumed infectious or inflammatory. 2. Groundglass changes in the lower lobes, most likely secondary to some mild atelectasis. Pneumonitis is considered less likely. No dense  consolidations. No clear evidence of volume overload. 3. Cardiomegaly with atherosclerotic disease and coronary artery disease. Electronically Signed: Cristian Tiwari MD  9/28/2024 2:17 AM EDT  Workstation ID: [id number]    XR Chest 1 View    Result Date: 9/28/2024  No acute cardiopulmonary findings. Electronically Signed: Cristian Tiwari MD  9/28/2024 1:33 AM EDT  Workstation ID: [id number]    XR Chest 1 View    Result Date: 9/27/2024  Impression: No acute cardiopulmonary findings. Electronically Signed: Claus Sánchez MD  9/27/2024 5:44 PM EDT  Workstation ID: [id number]   []  EKG/Telemetry   []  Cardiology/Vascular   []  Pathology  []  Old records  []  Other:    Assessment & Plan   Assessment / Plan     Assessment:  Epigastric pain, improving  Intractable Back pain, stable  New onst Atrial fibrillation; RQL1PT0-LCUd of 4  Hypoxia, likely due to fluid overload/pulmonary edema  History of diabetes mellitus  ESRD on HD  Psoriasis  Hypertension  Dyslipidemia  Mild CAD  Restrictive lung disease  Vertigo  GERD without esophagitis      Plan:  Patient currently being managed in medicine service.  Supportive management.  Discontinue IV Dilaudid  Had cardiac cath recently; showed mild CAD.  Elevated troponin but could be in the setting of ESRD; better than prior. EKG non significant.  Transthoracic echo was non significant.  Found to have Atrial fibrillation in telemetry; new onset.  Started on PO Eliquis 5mg BID.,  Continue  Continue Coreg.  Continue to titrate O2 to maintain SpO2>90%.  CT scan showing lumbar degenerative disease with chronic compression fractures at L3 and T12 as well as at T10. Pain could be related to it.-IF persistent, obtaining MRI of thoracic and lumbar spine.  Nephrologist consulted; underwent hemodialysis yesterday, no plans for dialysis today, increasing ultrafiltration tomorrow to 3 L  Continue rest of the current management.  Clinical course to determine disposition; likely in next 24 hours to home.      Discussed with RN.    VTE Prophylaxis:  Pharmacologic & mechanical VTE prophylaxis orders are present.        CODE STATUS:   Code Status (Patient has no pulse and is not breathing): CPR (Attempt to Resuscitate)  Medical Interventions (Patient has pulse or is breathing): Full Support      Electronically signed by Dayton Marcus MD, 10/2/2024, 13:48 EDT.

## 2024-10-02 NOTE — CONSULTS
"Nutrition Services    Patient Name: Nelson Wang  YOB: 1945  MRN: [id number]  Admission date: 9/28/2024      CLINICAL NUTRITION ASSESSMENT      Reason for Assessment  MST Score 2+     H&P:  Past Medical History:   Diagnosis Date    Abnormal stress test     Anemia     IRON INFUSIONS WITH DIALYSIS    Anesthesia     WITH HIP REPLACEMENT DAUGHTER BELIEVES HAD SVT 2000    Ankle pain, right     Anxiety and depression     CKD (chronic kidney disease), stage IV     DIALYSIS [id number] SHILEY IN RIGHT CHEST    Diabetes     Gout     High cholesterol     History of peritoneal dialysis     HL (hearing loss)     Hyperkalemia     Hypertension     Hypothyroidism     Insomnia     Night terrors     Psoriasis     Psoriasis     Sleep walking     Thrombocytopenia     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    Vitiligo         Current Problems:   Active Hospital Problems    Diagnosis     **Intractable pain     Abdominal pain         Nutrition/Diet History         Narrative   RD seen pt in dialysis.NKFA. Denies any chewing or swallowing difficulties at this time. Pt reports fair appetite with consuming 25% of the breakfast this morning. Willing to trial ONS for added calories/protein. RD educated pt the importance of consuming enough when he is on dialysis. Of note, phos is 6.1, pt reports taking Renvela 2 tablets TID with meals. RD to continue to follow up per protocol.      Anthropometrics        Current Height, Weight Height: 167.6 cm (66\")  Weight: 77.5 kg (170 lb 13.7 oz)   Current BMI Body mass index is 27.58 kg/m².   BMI Classification Normal range Healthy BMI for older adults is 23-30   % %   Adjusted Body Weight (ABW)    Weight Hx  Wt Readings from Last 30 Encounters:   10/02/24 0812 77.5 kg (170 lb 13.7 oz)   09/28/24 0330 77.2 kg (170 lb 3.1 oz)   09/27/24 1659 76.2 kg (167 lb 15.9 oz)   09/26/24 0823 78.9 kg (174 lb)   09/16/24 1814 79.3 kg (174 lb 13.2 oz)   09/12/24 1344 77.1 kg (169 lb 15.6 " oz)   08/30/24 0927 75.6 kg (166 lb 10.7 oz)   08/14/24 0922 76.7 kg (169 lb)   07/17/24 1116 77.2 kg (170 lb 3.2 oz)   07/16/24 0817 73.8 kg (162 lb 11.2 oz)   07/10/24 0740 73.5 kg (162 lb)   06/17/24 1604 74.6 kg (164 lb 6.4 oz)   06/10/24 1348 80 kg (176 lb 5.9 oz)   06/06/24 1423 72 kg (158 lb 11.2 oz)   05/29/24 0720 72.9 kg (160 lb 11.5 oz)   05/28/24 1015 74.5 kg (164 lb 3.9 oz)   05/24/24 0917 74.6 kg (164 lb 6.4 oz)   05/03/24 1100 73 kg (160 lb 14.4 oz)   05/03/24 0838 74.6 kg (164 lb 6.4 oz)   05/01/24 0847 73.5 kg (162 lb)   04/24/24 1105 73.8 kg (162 lb 9.6 oz)   04/19/24 1300 74.5 kg (164 lb 3.2 oz)   04/14/24 1735 71.7 kg (158 lb 1.1 oz)   04/08/24 2128 78 kg (171 lb 15.3 oz)   03/24/24 0526 78 kg (171 lb 15.3 oz)   03/22/24 0846 75.9 kg (167 lb 6 oz)   03/19/24 1023 75.5 kg (166 lb 6.4 oz)   03/12/24 0900 75.2 kg (165 lb 12.8 oz)   03/08/24 0940 70 kg (154 lb 5.2 oz)   03/07/24 1208 73.8 kg (162 lb 11.2 oz)   03/06/24 1100 73.9 kg (163 lb)   03/05/24 0600 78 kg (171 lb 15.3 oz)   03/04/24 0601 74.6 kg (164 lb 7.4 oz)   03/02/24 0300 83.4 kg (183 lb 13.8 oz)   03/01/24 0600 80.9 kg (178 lb 5.6 oz)   02/29/24 0919 80 kg (176 lb 5.9 oz)   02/29/24 0600 75 kg (165 lb 5.5 oz)   02/28/24 2143 81.8 kg (180 lb 5.4 oz)   02/24/24 0513 78.2 kg (172 lb 6.4 oz)   02/22/24 0500 78.1 kg (172 lb 2.9 oz)   02/21/24 0400 78.4 kg (172 lb 13.5 oz)   02/20/24 0528 75.4 kg (166 lb 3.6 oz)   02/18/24 1840 76.3 kg (168 lb 3.4 oz)   02/16/24 0729 76.2 kg (167 lb 15.9 oz)   01/17/24 1051 76.2 kg (168 lb)   12/06/23 0854 76.6 kg (168 lb 12.8 oz)   09/20/23 1100 74.8 kg (165 lb)          Wt Change Observation Variable wt trends since pt is on dialysis.      Estimated/Assessed Needs  Estimated Needs based on: Current Body Weight       Energy Requirements 25-30 kcal/kg   EST Needs (kcal/day) 0373-7933       Protein Requirements 1.2-1.3 g/kg   EST Daily Needs (g/day)        Fluid Requirements 750ml + urine output      Estimated Needs (mL/day) 750ml + urine ouput      Labs/Medications         Pertinent Labs Reviewed.   Results from last 7 days   Lab Units 10/02/24  0526 10/01/24  0537 09/30/24  0606 09/28/24  0400 09/28/24  0126 09/27/24  1655   SODIUM mmol/L 128* 131* 128*   < > 136 137   POTASSIUM mmol/L 4.4 4.4 4.5   < > 4.2 3.8   CHLORIDE mmol/L 90* 93* 87*   < > 94* 95*   CO2 mmol/L 18.4* 21.9* 22.1   < > 28.0 29.4*   BUN mg/dL 70* 52* 93*   < > 59* 48*   CREATININE mg/dL 8.11* 6.87* 9.93*   < > 5.95* 5.36*   CALCIUM mg/dL 9.6 9.6 9.3   < > 9.8 9.7   BILIRUBIN mg/dL 0.5  --   --   --  0.7 0.6   ALK PHOS U/L 67  --   --   --  79 95   ALT (SGPT) U/L 18  --   --   --  12 12   AST (SGOT) U/L 28  --   --   --  14 15   GLUCOSE mg/dL 147* 216* 191*   < > 140* 147*    < > = values in this interval not displayed.     Results from last 7 days   Lab Units 10/02/24  0526 10/01/24  0537 09/30/24  0606   MAGNESIUM mg/dL 2.1 1.8 2.0   PHOSPHORUS mg/dL 6.1* 4.3 6.7*   HEMOGLOBIN g/dL 9.2* 8.9* 9.1*   HEMATOCRIT % 29.2* 29.0* 29.3*     COVID19   Date Value Ref Range Status   09/28/2024 Not Detected Not Detected - Ref. Range Final     Lab Results   Component Value Date    HGBA1C 6.5 (H) 08/21/2024         Pertinent Medications Reviewed.     Malnutrition Severity Assessment              Nutrition Diagnosis         Nutrition Dx Problem 1 Increased nutrient needs related to increased protein demand as evidenced by  pt is on home hemodialysis x 4 /week      Nutrition Intervention           Current Nutrition Orders & Evaluation of Intake       Current PO Diet Diet: Cardiac, Renal, Fluid Restriction (240 mL/tray); Healthy Heart (2-3 Na+); Low Phosphorus; Other (Specify mL/day) (1800); Fluid Consistency: Thin (IDDSI 0)   Supplement No active supplement orders           Nutrition Intervention/Prescription        Recommend to add Novasource Renal BID with meals.   Continue current diet order and encourage po intake.         Medical Nutrition  Therapy/Nutrition Education          Learner     Readiness Patient  Acceptance     Method     Response Explanation  Verbalizes understanding     Monitor/Evaluation        Monitor Per protocol, PO intake, Supplement intake, Weight, Symptoms, POC/GOC     Nutrition Discharge Plan         Recommend to continue oral nutrition supplements on discharge.      Electronically signed by:  Samantha Buenrostro RD  10/02/24 13:45 EDT

## 2024-10-02 NOTE — PLAN OF CARE
Goal Outcome Evaluation:              Outcome Evaluation: Pt had dialysis this shift.  Pt takes off his oxygen tubing, staff continue to check on pt and assist with putting it back on.  VS WNl for pt.  Pt refused medications this shift.

## 2024-10-02 NOTE — SIGNIFICANT NOTE
10/02/24 1400   Physical Therapy Time and Intention   Comment, Session Not Performed Pt unavailable, in dialysis

## 2024-10-02 NOTE — PLAN OF CARE
Goal Outcome Evaluation:  Plan of Care Reviewed With: patient        Progress: improving  Outcome Evaluation: Vitals stable. Remains on 1.5L NC. Daughter at bedside who states he is sleeping better tonight than he has in days. Tylenol given for pain. Will continue to monitor.

## 2024-10-02 NOTE — NURSING NOTE
Duration of Treatment 4.0 Hours   Access Site Tunneled Dialysis Catheter   Dialyzer Revaclear    mL/min   Dialysate Temperature (C) 36   Use Crit-Line? No   BFR-As tolerated to a maximum of: 450 mL/min   Dialysate Solution Bath: K+ = 3 mEq, CA = 2.5mg   Bicarb 33 mEq   Na+ 137 meq   Fluid Removal: 3L     Beginning of treatment went well. The last hour the pt became restless and confused, wanting to stop treatment. We stopped with 30min remaining.    Removed: 2700ml  BLP: 83.7  Time: 3hr 29min

## 2024-10-02 NOTE — TELEPHONE ENCOUNTER
Last Office Visit w/ HK: 9.26.24    We rec'd notification that pt's upcoming Colonoscopy was cx'd due to patient being admitted to [facility].     We can call patient once d/c'd and see about getting his procedure r/s'd. Will route D/C summary to Flor Tuttle once pt is discharged.     Postponing Patient Call until tomorrow, 10.3.24

## 2024-10-02 NOTE — PROGRESS NOTES
" LOS: 3 days   Patient Care Team:  Domingo Bravo MD as PCP - General (Internal Medicine)  Josephine Warren APRN as Nurse Practitioner (Pulmonary Disease)  Virginie Lopez APRN as Nurse Practitioner (Pulmonary Disease)    Chief Complaint: ESRD    Subjective     History of Present Illness  Pt with some confusion when I saw him this morning.  Talking in his sleep but then arousable, appropriate.  Tolerating dialysis well.  Denies shortness of breath.  Denied pain  Stated he ate well yesterday.  Remains on oxygen    Subjective:  Symptoms:  Stable.  He reports weakness.  No shortness of breath or chest pain.    Diet:  Adequate intake.  No nausea or vomiting.    Activity level: Impaired due to weakness.        History taken from: patient chart RN    Objective     Vital Sign Min/Max for last 24 hours  Temp  Min: 97.3 °F (36.3 °C)  Max: 98.1 °F (36.7 °C)   BP  Min: 124/59  Max: 178/75   Pulse  Min: 65  Max: 79   Resp  Min: 16  Max: 18   SpO2  Min: 92 %  Max: 99 %   Flow (L/min)  Min: 1.5  Max: 1.5   Weight  Min: 77.5 kg (170 lb 13.7 oz)  Max: 77.5 kg (170 lb 13.7 oz)     Flowsheet Rows      Flowsheet Row First Filed Value   Admission Height 167.6 cm (66\") Documented at 09/28/2024 0113   Admission Weight 77.2 kg (170 lb 3.1 oz) Documented at 09/28/2024 0330            I/O this shift:  In: 120 [P.O.:120]  Out: 2700   I/O last 3 completed shifts:  In: 240 [P.O.:240]  Out: -     Objective:  General Appearance:  Comfortable and not in pain.    Vital signs: (most recent): Blood pressure 149/59, pulse 78, temperature 97.3 °F (36.3 °C), temperature source Oral, resp. rate 18, height 167.6 cm (66\"), weight 77.5 kg (170 lb 13.7 oz), SpO2 92%.  Vital signs are normal.  No fever.    HEENT: Normal HEENT exam.    Lungs:  Normal effort and normal respiratory rate.    Heart: Normal rate.  Regular rhythm.    Chest: (Right IJ TDC.)  Abdomen: Abdomen is soft.  Bowel sounds are normal.   There is no abdominal tenderness.     Extremities: " "Normal range of motion.  There is dependent edema.  (Trace edema, left upper extremity AV fistula with some bruising.)  Neurological: Patient is alert and oriented to person, place and time.    Pupils:  Pupils are equal, round, and reactive to light.    Skin:  Warm and dry.                Results Review:     I reviewed the patient's new clinical results.  I reviewed the patient's new imaging results and agree with the interpretation.  I reviewed the patient's other test results and agree with the interpretation    WBC WBC   Date Value Ref Range Status   10/02/2024 5.63 3.40 - 10.80 10*3/mm3 Final   10/01/2024 6.86 3.40 - 10.80 10*3/mm3 Final   09/30/2024 6.59 3.40 - 10.80 10*3/mm3 Final      HGB Hemoglobin   Date Value Ref Range Status   10/02/2024 9.2 (L) 13.0 - 17.7 g/dL Final   10/01/2024 8.9 (L) 13.0 - 17.7 g/dL Final   09/30/2024 9.1 (L) 13.0 - 17.7 g/dL Final      HCT Hematocrit   Date Value Ref Range Status   10/02/2024 29.2 (L) 37.5 - 51.0 % Final   10/01/2024 29.0 (L) 37.5 - 51.0 % Final   09/30/2024 29.3 (L) 37.5 - 51.0 % Final      Platlets No results found for: \"LABPLAT\"   MCV MCV   Date Value Ref Range Status   10/02/2024 94.5 79.0 - 97.0 fL Final   10/01/2024 96.3 79.0 - 97.0 fL Final   09/30/2024 95.1 79.0 - 97.0 fL Final          Sodium Sodium   Date Value Ref Range Status   10/02/2024 128 (L) 136 - 145 mmol/L Final   10/01/2024 131 (L) 136 - 145 mmol/L Final   09/30/2024 128 (L) 136 - 145 mmol/L Final      Potassium Potassium   Date Value Ref Range Status   10/02/2024 4.4 3.5 - 5.2 mmol/L Final   10/01/2024 4.4 3.5 - 5.2 mmol/L Final   09/30/2024 4.5 3.5 - 5.2 mmol/L Final      Chloride Chloride   Date Value Ref Range Status   10/02/2024 90 (L) 98 - 107 mmol/L Final   10/01/2024 93 (L) 98 - 107 mmol/L Final   09/30/2024 87 (L) 98 - 107 mmol/L Final      CO2 CO2   Date Value Ref Range Status   10/02/2024 18.4 (L) 22.0 - 29.0 mmol/L Final   10/01/2024 21.9 (L) 22.0 - 29.0 mmol/L Final   09/30/2024 " "22.1 22.0 - 29.0 mmol/L Final      BUN BUN   Date Value Ref Range Status   10/02/2024 70 (H) 8 - 23 mg/dL Final   10/01/2024 52 (H) 8 - 23 mg/dL Final   09/30/2024 93 (H) 8 - 23 mg/dL Final      Creatinine Creatinine   Date Value Ref Range Status   10/02/2024 8.11 (H) 0.76 - 1.27 mg/dL Final   10/01/2024 6.87 (H) 0.76 - 1.27 mg/dL Final   09/30/2024 9.93 (H) 0.76 - 1.27 mg/dL Final      Calcium Calcium   Date Value Ref Range Status   10/02/2024 9.6 8.6 - 10.5 mg/dL Final   10/01/2024 9.6 8.6 - 10.5 mg/dL Final   09/30/2024 9.3 8.6 - 10.5 mg/dL Final      PO4 No results found for: \"CAPO4\"   Albumin Albumin   Date Value Ref Range Status   10/02/2024 3.4 (L) 3.5 - 5.2 g/dL Final      Magnesium Magnesium   Date Value Ref Range Status   10/02/2024 2.1 1.6 - 2.4 mg/dL Final   10/01/2024 1.8 1.6 - 2.4 mg/dL Final   09/30/2024 2.0 1.6 - 2.4 mg/dL Final      Uric Acid No results found for: \"URICACID\"     Medication Review:   apixaban, 5 mg, Oral, Q12H  ARIPiprazole, 2 mg, Oral, BID  aspirin, 324 mg, Oral, Once  atorvastatin, 40 mg, Oral, Daily  carvedilol, 25 mg, Oral, BID With Meals  epoetin trevor/trevor-epbx, 5,000 Units, Intravenous, Once per day on Monday Wednesday Friday  insulin lispro, 2-9 Units, Subcutaneous, 4x Daily AC & at Bedtime  levothyroxine, 175 mcg, Oral, Q AM  Lidocaine, 1 patch, Transdermal, Daily  oxymetazoline, 2 spray, Each Nare, BID  pantoprazole, 40 mg, Oral, Daily  sertraline, 100 mg, Oral, Nightly  sevelamer, 1,600 mg, Oral, TID With Meals  sodium chloride, 10 mL, Intravenous, Q12H  torsemide, 100 mg, Oral, Daily          Assessment & Plan       Intractable pain    Abdominal pain      Assessment & Plan  1.  ESRD: Volume status is adequate.  UF 3 kg with dialysis, today.  Discussed with primary, nursing staff and his daughter.  Will plan dialysis again tomorrow and UF 2 kg as tolerated.  Was in the process of getting training to do HHD.       2.  Anemia: Hemoglobin decreased some, started EPO with " dialysis follow trends     3.  History of diabetes mellitus: Treatment per primary     4.  Abdominal pain/chest pain: Further workup and treatment per primary, CT scan showed large right renal pelvis stone, unsure if contributing to his pain, currently pain-free.  MRI of lumbar and thoracic spine compatible with DJD.     5.  Hypertension: Blood pressure up-and-down, continue current medications and as needed clonidine.  2D echo showed mild LVH with grade 1 diastolic dysfunction.     6.  History of hypothyroidism on Synthroid, per primary.     7.  Hyponatremia: Secondary to water intake and renal failure will improve with each dialysis, continue 1800 cc p.o. fluid restriction per day.       8.  Hyperphosphatemia: Continue phosphate binder but adjust dosage and time with meals, continue low phosphorus diet.    9.  Confusion, seems better.  Will follow.    Coreen Castro MD  10/02/24  15:15 EDT

## 2024-10-02 NOTE — SIGNIFICANT NOTE
10/02/24 1338   OTHER   Discipline occupational therapist   Rehab Time/Intention   Session Not Performed patient unavailable for treatment  (diaylsis)

## 2024-10-03 ENCOUNTER — READMISSION MANAGEMENT (OUTPATIENT)
Dept: CALL CENTER | Facility: HOSPITAL | Age: 79
End: 2024-10-03
Payer: MEDICARE

## 2024-10-03 VITALS
TEMPERATURE: 98.3 F | HEIGHT: 66 IN | DIASTOLIC BLOOD PRESSURE: 71 MMHG | SYSTOLIC BLOOD PRESSURE: 129 MMHG | BODY MASS INDEX: 27.46 KG/M2 | RESPIRATION RATE: 18 BRPM | OXYGEN SATURATION: 93 % | WEIGHT: 170.86 LBS | HEART RATE: 104 BPM

## 2024-10-03 LAB
ANION GAP SERPL CALCULATED.3IONS-SCNC: 15.9 MMOL/L (ref 5–15)
BASOPHILS # BLD AUTO: 0.04 10*3/MM3 (ref 0–0.2)
BASOPHILS NFR BLD AUTO: 0.7 % (ref 0–1.5)
BUN SERPL-MCNC: 43 MG/DL (ref 8–23)
BUN/CREAT SERPL: 7.9 (ref 7–25)
CALCIUM SPEC-SCNC: 9.5 MG/DL (ref 8.6–10.5)
CHLORIDE SERPL-SCNC: 90 MMOL/L (ref 98–107)
CO2 SERPL-SCNC: 26.1 MMOL/L (ref 22–29)
CREAT SERPL-MCNC: 5.41 MG/DL (ref 0.76–1.27)
DEPRECATED RDW RBC AUTO: 49.8 FL (ref 37–54)
EGFRCR SERPLBLD CKD-EPI 2021: 10.2 ML/MIN/1.73
EOSINOPHIL # BLD AUTO: 0.02 10*3/MM3 (ref 0–0.4)
EOSINOPHIL NFR BLD AUTO: 0.3 % (ref 0.3–6.2)
ERYTHROCYTE [DISTWIDTH] IN BLOOD BY AUTOMATED COUNT: 14.6 % (ref 12.3–15.4)
GLUCOSE BLDC GLUCOMTR-MCNC: 142 MG/DL (ref 70–99)
GLUCOSE SERPL-MCNC: 135 MG/DL (ref 65–99)
HCT VFR BLD AUTO: 30.4 % (ref 37.5–51)
HGB BLD-MCNC: 9.7 G/DL (ref 13–17.7)
IMM GRANULOCYTES # BLD AUTO: 0.02 10*3/MM3 (ref 0–0.05)
IMM GRANULOCYTES NFR BLD AUTO: 0.3 % (ref 0–0.5)
LYMPHOCYTES # BLD AUTO: 1.1 10*3/MM3 (ref 0.7–3.1)
LYMPHOCYTES NFR BLD AUTO: 17.9 % (ref 19.6–45.3)
MAGNESIUM SERPL-MCNC: 1.7 MG/DL (ref 1.6–2.4)
MCH RBC QN AUTO: 29.8 PG (ref 26.6–33)
MCHC RBC AUTO-ENTMCNC: 31.9 G/DL (ref 31.5–35.7)
MCV RBC AUTO: 93.5 FL (ref 79–97)
MONOCYTES # BLD AUTO: 0.95 10*3/MM3 (ref 0.1–0.9)
MONOCYTES NFR BLD AUTO: 15.5 % (ref 5–12)
NEUTROPHILS NFR BLD AUTO: 4 10*3/MM3 (ref 1.7–7)
NEUTROPHILS NFR BLD AUTO: 65.3 % (ref 42.7–76)
NRBC BLD AUTO-RTO: 0 /100 WBC (ref 0–0.2)
PHOSPHATE SERPL-MCNC: 5 MG/DL (ref 2.5–4.5)
PLATELET # BLD AUTO: 126 10*3/MM3 (ref 140–450)
PMV BLD AUTO: 10.4 FL (ref 6–12)
POTASSIUM SERPL-SCNC: 4.1 MMOL/L (ref 3.5–5.2)
RBC # BLD AUTO: 3.25 10*6/MM3 (ref 4.14–5.8)
SODIUM SERPL-SCNC: 132 MMOL/L (ref 136–145)
WBC NRBC COR # BLD AUTO: 6.13 10*3/MM3 (ref 3.4–10.8)

## 2024-10-03 PROCEDURE — 94799 UNLISTED PULMONARY SVC/PX: CPT

## 2024-10-03 PROCEDURE — 80048 BASIC METABOLIC PNL TOTAL CA: CPT | Performed by: STUDENT IN AN ORGANIZED HEALTH CARE EDUCATION/TRAINING PROGRAM

## 2024-10-03 PROCEDURE — 94640 AIRWAY INHALATION TREATMENT: CPT

## 2024-10-03 PROCEDURE — 99239 HOSP IP/OBS DSCHRG MGMT >30: CPT | Performed by: INTERNAL MEDICINE

## 2024-10-03 PROCEDURE — 85025 COMPLETE CBC W/AUTO DIFF WBC: CPT | Performed by: STUDENT IN AN ORGANIZED HEALTH CARE EDUCATION/TRAINING PROGRAM

## 2024-10-03 PROCEDURE — 63710000001 ONDANSETRON ODT 4 MG TABLET DISPERSIBLE: Performed by: PHYSICIAN ASSISTANT

## 2024-10-03 PROCEDURE — 84100 ASSAY OF PHOSPHORUS: CPT | Performed by: STUDENT IN AN ORGANIZED HEALTH CARE EDUCATION/TRAINING PROGRAM

## 2024-10-03 PROCEDURE — 94618 PULMONARY STRESS TESTING: CPT

## 2024-10-03 PROCEDURE — 82948 REAGENT STRIP/BLOOD GLUCOSE: CPT | Performed by: STUDENT IN AN ORGANIZED HEALTH CARE EDUCATION/TRAINING PROGRAM

## 2024-10-03 PROCEDURE — 83735 ASSAY OF MAGNESIUM: CPT | Performed by: STUDENT IN AN ORGANIZED HEALTH CARE EDUCATION/TRAINING PROGRAM

## 2024-10-03 RX ORDER — SEVELAMER CARBONATE 800 MG/1
1600 TABLET, FILM COATED ORAL
Qty: 180 TABLET | Refills: 0 | Status: SHIPPED | OUTPATIENT
Start: 2024-10-03 | End: 2024-11-02

## 2024-10-03 RX ORDER — BUDESONIDE 0.5 MG/2ML
0.5 INHALANT ORAL
Status: DISCONTINUED | OUTPATIENT
Start: 2024-10-03 | End: 2024-10-03 | Stop reason: HOSPADM

## 2024-10-03 RX ORDER — ARFORMOTEROL TARTRATE 15 UG/2ML
15 SOLUTION RESPIRATORY (INHALATION)
Status: DISCONTINUED | OUTPATIENT
Start: 2024-10-03 | End: 2024-10-03 | Stop reason: HOSPADM

## 2024-10-03 RX ADMIN — TORSEMIDE 100 MG: 20 TABLET ORAL at 08:16

## 2024-10-03 RX ADMIN — CARVEDILOL 25 MG: 25 TABLET, FILM COATED ORAL at 08:16

## 2024-10-03 RX ADMIN — BUDESONIDE 0.5 MG: 0.5 SUSPENSION RESPIRATORY (INHALATION) at 06:32

## 2024-10-03 RX ADMIN — ARIPIPRAZOLE 2 MG: 2 TABLET ORAL at 08:16

## 2024-10-03 RX ADMIN — IPRATROPIUM BROMIDE 0.5 MG: 0.5 SOLUTION RESPIRATORY (INHALATION) at 06:32

## 2024-10-03 RX ADMIN — ARFORMOTEROL TARTRATE 15 MCG: 15 SOLUTION RESPIRATORY (INHALATION) at 06:32

## 2024-10-03 RX ADMIN — SEVELAMER CARBONATE 1600 MG: 800 TABLET, FILM COATED ORAL at 08:16

## 2024-10-03 RX ADMIN — PANTOPRAZOLE SODIUM 40 MG: 40 TABLET, DELAYED RELEASE ORAL at 08:16

## 2024-10-03 RX ADMIN — ATORVASTATIN CALCIUM 40 MG: 40 TABLET, FILM COATED ORAL at 08:16

## 2024-10-03 RX ADMIN — LEVOTHYROXINE SODIUM 175 MCG: 75 TABLET ORAL at 05:08

## 2024-10-03 RX ADMIN — ONDANSETRON 4 MG: 4 TABLET, ORALLY DISINTEGRATING ORAL at 05:08

## 2024-10-03 NOTE — PROGRESS NOTES
Walking Oximetry Progress Note      Patient Name:  Nelson Wang  YOB: 1945  Date of Procedure: 10/03/24              ROOM AIR BASELINE   SpO2% 93   Heart Rate 96     EXERCISE ON ROOM AIR SpO2% EXERCISE ON O2 LPM SpO2%   1 MINUTE 93 1 MINUTE     2 MINUTES 93 2 MINUTES     3 MINUTES 94 3 MINUTES     4 MINUTES 95 4 MINUTES     5 MINUTES 96 5 MINUTES     6 MINUTES 95 6 MINUTES                Time to Recovery  NA   SpO2% Post Exercise  93 on room air.    HR Post Exercise  104     Comments:           Electronically signed by Hector Mortensen CRT, 10/03/24, 2:51 PM EDT.

## 2024-10-03 NOTE — DISCHARGE SUMMARY
[facility]         HOSPITALIST  DISCHARGE SUMMARY    Patient Name: Nelson Wang  : 1945  MRN: [id number]    Date of Admission: 2024  Date of Discharge:  10/3/2024  Primary Care Physician: Domingo Bravo MD    Consults       Date and Time Order Name Status Description    2024 10:01 AM Inpatient Nephrology Consult      2024  2:41 AM Hospitalist (on-call MD unless specified)              Active and Resolved Hospital Problems:  Epigastric pain, improved  Intractable Back pain, improved  New onst Atrial fibrillation; [id number] of 4  Hypoxia, likely due to fluid overload/pulmonary edema, resolved  History of diabetes mellitus  ESRD on HD  Psoriasis  Hypertension  Dyslipidemia  Mild CAD  Restrictive lung disease  Vertigo  GERD without esophagitis    Hospital Course     Hospital Course:  Nelson Wang is a 79 y.o. male with history of diabetes, ESRD on HD, psoriasis, hypertension, dyslipidemia, mild CAD, restrictive lung disease, vertigo, GERD without esophagitis, E. coli bacteremia with septic shock, C. difficile colitis and previous tobacco use who presented with chest pain and abdominal pain. Pain was worsening throughout the day and aggravated with movement. CT abdomen and pelvis along with chest was performed on presentation which showed Interval resolution of previously described 10 mm left lower lobe nodule, some mild atelectasis; colonic diverticulosis without diverticulitis. Surgically absent appendix; Large calculus is again identified in the right renal pelvis; bilateral renal cortical thinning with bilateral renal cysts. Also showed lumbar degenerative disease with chronic compression fractures at L3 and T12 as well as at T10. Patient was started on pain medications and admitted for further evaluation and management.  Patient underwent echocardiogram which was significant for an EF of 56 to 60%, mild concentric hypertrophy, grade 1 diastolic dysfunction. Has  chronic compression fracture but also having back pain; MRI of thoracic and lumbar region showed degenerative changes and old T12 and L3 compression fracture; nothing acute, pain did improve.  Patient had pulmonary edema while in the hospital, workup was negative for pneumonia.  Patient's ultrafiltration was increased with improvement of his symptoms, he was weaned to room air, patient underwent 6-minute walk test for which he passed.  Patient is being discharged home today in stable condition.  Patient should follow-up with his PCP in 3 to 7 days.  Patient is being discharged on Eliquis for stroke prevention.  Patient should continue to follow-up with nephrology as scheduled.  Patient is in the process of getting set up for home hemodialysis.  Patient is discharged home today under the care of his daughter.    Day of Discharge     Vital Signs:  Temp:  [97.9 °F (36.6 °C)-98.4 °F (36.9 °C)] 98.3 °F (36.8 °C)  Heart Rate:  [] 104  Resp:  [18] 18  BP: (129-157)/(57-89) 129/71  Flow (L/min):  [2] 2    Physical Exam:   GEN: No acute distress  HEENT: Moist mucous membranes  LUNGS: Equal chest rise bilaterally  CARDIAC: Regular rate and rhythm  NEURO: Moving all 4 extremities spontaneously    Discharge Details        Discharge Medications        New Medications        Instructions Start Date   apixaban 5 MG tablet tablet  Commonly known as: ELIQUIS   5 mg, Oral, Every 12 Hours Scheduled             Changes to Medications        Instructions Start Date   midodrine 2.5 MG tablet  Commonly known as: PROAMATINE  What changed: additional instructions   2.5 mg, Oral, 3 Times Daily Before Meals, On dialysis days      sevelamer 800 MG tablet  Commonly known as: RENVELA  What changed:   how much to take  when to take this   1,600 mg, Oral, 3 Times Daily With Meals             Continue These Medications        Instructions Start Date   allopurinol 100 MG tablet  Commonly known as: ZYLOPRIM   1 tablet, Oral, Daily, As needed       ARIPiprazole 2 MG tablet  Commonly known as: ABILIFY   2 mg, Oral, 2 Times Daily      atorvastatin 40 MG tablet  Commonly known as: LIPITOR   40 mg, Oral, Daily      carvedilol 25 MG tablet  Commonly known as: COREG   25 mg, Oral, 2 Times Daily With Meals      cloNIDine 0.2 MG tablet  Commonly known as: CATAPRES   1 tablet, Oral, Every 8 Hours PRN      Insulin Glargine 100 UNIT/ML injection pen  Commonly known as: LANTUS SOLOSTAR   10 Units, Subcutaneous, Nightly      Levothyroxine Sodium 175 MCG capsule   175 mcg, Oral, Daily      lidocaine-prilocaine 2.5-2.5 % cream  Commonly known as: EMLA   Apply 1 Application topically to the appropriate area as directed As Needed for Injection Site Pain.      Lokelma 10 g packet  Generic drug: sodium zirconium cyclosilicate   1 packet, Oral, Every Other Day, TAKES MON, WED, FRI, SUN      MIRCERA IJ   30 mcg, Every 14 Days, RECEIVES DURING DIALYSIS      pantoprazole 40 MG EC tablet  Commonly known as: PROTONIX   40 mg, Oral, Daily      sertraline 100 MG tablet  Commonly known as: ZOLOFT   100 mg, Oral, Nightly      torsemide 100 MG tablet  Commonly known as: DEMADEX   100 mg, Oral, Daily               No Known Allergies    Discharge Disposition:  Home or Self Care    Diet:  Hospital:  Diet Order   Procedures    Diet: Cardiac, Renal, Fluid Restriction (240 mL/tray); Healthy Heart (2-3 Na+); Low Phosphorus; 1500 mL/day; Fluid Consistency: Thin (IDDSI 0)       Discharge Activity:       CODE STATUS:  Code Status and Medical Interventions: CPR (Attempt to Resuscitate); Full Support   Ordered at: 09/28/24 0246     Code Status (Patient has no pulse and is not breathing):    CPR (Attempt to Resuscitate)     Medical Interventions (Patient has pulse or is breathing):    Full Support       Future Appointments   Date Time Provider Department [place]   10/14/2024  1:00 PM Domingo Bravo MD [facility] [place] [place] IMP ETWN [facility]   10/15/2024 11:15 AM AMY XR FL 1 BH AMY XRAY [facility]   10/30/2024  9:45 AM Saloni  Armando Yee DPM [facility] [place] ETWN [facility]   11/25/2024  9:00 AM AMY RUSTY CT 1 BH AMY ETWCT AMY   1/9/2025  9:45 AM Sandra Espinal MD [facility] [place] ETW AMY   1/9/2025  2:00 PM Stephan Nichols MD [facility] [place] CD EDIXE AMY       Additional Instructions for the Follow-ups that You Need to Schedule       Discharge Follow-up with PCP   As directed       Currently Documented PCP:    Domingo Bravo MD    PCP Phone Number:    [phone]     Follow Up Details: 3 to 7 days                Pertinent  and/or Most Recent Results     IMAGING:  XR Chest 1 View    Result Date: 9/30/2024  XR CHEST 1 VW Date of Exam: 9/30/2024 11:32 AM EDT Indication: Hypoxia Comparison: CT chest 9/28/2024, chest x-ray 9/28/2024 Findings: Low lung volumes. Cardiomegaly. Dual-lumen central venous catheter tip terminates at the right atrium. Increased septal thickening. Increased bibasilar airspace opacity. Trace pleural effusions suspected.     Impression: Findings of pulmonary edema with trace pleural effusions. Electronically Signed: Solange Iverson MD  9/30/2024 11:50 AM EDT  Workstation ID: [id number]    Adult Transthoracic Echo Complete W/ Cont if Necessary Per Protocol    Result Date: 9/29/2024    Left ventricular ejection fraction appears to be 56 - 60%.   Left ventricular wall thickness is consistent with mild concentric hypertrophy.   Left ventricular diastolic function is consistent with (grade I) impaired relaxation.   There is calcification of the aortic valve.No significant stenosis.     MRI Lumbar Spine Without Contrast    Result Date: 9/29/2024   MRI LUMBAR SPINE WO CONTRAST-  Date of Exam: 9/29/2024 12:09 PM  Indication: Back pain; chronic compression fractures; N18.6-End stage renal disease; Z99.2-Dependence on renal dialysis; R52-Pain, unspecified; R26.2-Difficulty in walking, not elsewhere classified.  Comparison: CT scan of the abdomen and pelvis 4/8/2024.  Technique:  Routine multiplanar/multisequence sequence images of the lumbar spine were obtained  without contrast administration.    Findings: There is an old L3 compression fracture. The conus medullaris has a normal appearance ending at the L1-L2 level. There is an old L3 compression fracture with mild retropulsion. No evidence of acute marrow edema or fracture. No paraspinal masses are identified. The abdominal aorta has a normal caliber. Disc degeneration and facet OA are present throughout the lumbar spine with areas of mild/moderate central canal and foraminal stenosis. No evidence of high-grade central canal stenosis or high-grade foraminal stenosis.      Impression:  1. Old L3 compression fracture with mild retropulsion. No acute fractures are identified.  2. Degenerative change throughout the lumbar spine with areas of mild/moderate central canal and foraminal stenosis.    Electronically Signed By-EPIFANIO RENTERIA MD On:9/29/2024 12:42 PM      MRI Thoracic Spine Without Contrast    Result Date: 9/29/2024   MRI THORACIC SPINE WO CONTRAST-  Date of Exam: 9/29/2024 12:08 PM  Indication: back pain;; N18.6-End stage renal disease; Z99.2-Dependence on renal dialysis; R52-Pain, unspecified; R26.2-Difficulty in walking, not elsewhere classified.  Comparison: CT scan of the chest 5/24/2024.  Technique:  Routine multiplanar/multisequence sequence images of the thoracic spine were obtained without contrast administration.   Findings: Endplate degenerative changes are present throughout the thoracic spine with marginal osteophytes. No evidence of acute marrow edema or acute fracture. There is an old T12 compression fracture. No paraspinal masses are identified. The thoracic cord has a normal appearance. No evidence of abnormal intramedullary signal or myelomalacia. No evidence of high-grade central canal stenosis. Old mild compression fracture of T10.      Impression: Degenerative change. No acute findings.    Electronically Signed By-EPIFANIO RENTERIA MD On:9/29/2024 12:33 PM      CT Abdomen Pelvis Without  Contrast    Result Date: 9/28/2024  CT ABDOMEN PELVIS WO CONTRAST Date of Exam: 9/28/2024 1:43 AM EDT Indication: Abdominal and flank pain. Comparison: 4/8/2024 Technique: Axial CT images were obtained of the abdomen and pelvis without the administration of contrast. Reconstructed coronal and sagittal images were also obtained. Automated exposure control and iterative construction methods were used. Findings: Please see chest CT report dictated separately. There is atherosclerotic disease with no aortic aneurysm. Gallbladder is grossly normal. No biliary obstruction is seen. Previously seen peritoneal dialysis catheter has been removed. Again seen are bilateral renal cortical thinning and bilateral renal cysts. A large stone in the right renal pelvis measures about 12 mm in size and is unchanged in position. There is no associated hydronephrosis. No ureteral stones on either side. The unenhanced solid  abdominal organs are otherwise normal. Urinary bladder and prostate gland appear normal. There is streak artifact in the pelvis from a right hip arthroplasty. The appendix is surgically absent. There is colonic diverticulosis without diverticulitis or colitis. No small bowel obstruction is identified. The stomach is normal. No free fluid or adenopathy. There is lumbar degenerative disease with chronic compression fractures at L3 and T12 as well as at T10.     1. No bowel obstruction or evidence of colitis. There is colonic diverticulosis without diverticulitis. Surgically absent appendix. 2. Large calculus is again identified in the right renal pelvis, not significantly changed. No hydronephrosis on either side. There is bilateral renal cortical thinning with bilateral renal cysts. 3. Interval removal of a peritoneal dialysis catheter. Electronically Signed: Cristian Tiwari MD  9/28/2024 2:29 AM EDT  Workstation ID: [id number]    CT Chest Without Contrast Diagnostic    Result Date: 9/28/2024  CT CHEST WO CONTRAST  DIAGNOSTIC Date of Exam: 9/28/2024 1:43 AM EDT Indication: lower chest pain, unable to localize. Comparison: 8/23/2024 Technique: Axial CT images were obtained of the chest without contrast administration.  Reconstructed coronal and sagittal images were also obtained. Automated exposure control and iterative construction methods were used. Findings: Please see abdomen pelvis CT report dictated separately. There is marked bilateral gynecomastia. Heart is enlarged. There is atherosclerotic disease and coronary artery disease. Central venous catheter tip at the right atrial level. No pleural or pericardial effusion. No adenopathy allowing for the lack of contrast. Previously described 10 mm nodule in the left lower lobe has resolved, presumably infectious or inflammatory. There are subpleural reticular changes in the lungs, particularly the upper lobes compatible with a mild degree of fibrosis as seen previously. There are some groundglass changes in the lower lobes, likely indicative of atelectasis rather than acute pneumonitis. No clear evidence of volume overload at this time. There is diffuse degenerative disease in the thoracic spine.     1. Interval resolution of previously described 10 mm left lower lobe nodule, presumed infectious or inflammatory. 2. Groundglass changes in the lower lobes, most likely secondary to some mild atelectasis. Pneumonitis is considered less likely. No dense consolidations. No clear evidence of volume overload. 3. Cardiomegaly with atherosclerotic disease and coronary artery disease. Electronically Signed: Cristian Tiwari MD  9/28/2024 2:17 AM EDT  Workstation ID: [id number]    XR Chest 1 View    Result Date: 9/28/2024  XR CHEST 1 VW Date of Exam: 9/28/2024 1:29 AM EDT Indication: Chest Pain Triage Protocol Comparison: 9/27/2024 at 1725 hours Findings: Heart remains enlarged. Dialysis catheter tip is near the right atrial level. There is chronic elevation of the right hemidiaphragm. The  lungs are clear. Pulmonary vascularity is normal. No pneumothorax is identified.     No acute cardiopulmonary findings. Electronically Signed: Cristian Tiwari MD  9/28/2024 1:33 AM EDT  Workstation ID: [id number]    XR Chest 1 View    Result Date: 9/27/2024  XR CHEST 1 VW Date of Exam: 9/27/2024 5:20 PM EDT Indication: Chest pain protocol Chest Pain Protocol Comparison: Chest radiograph 9/16/2024, chest CT 8/23/2024. Findings: Unchanged position of right chest port tip. Mildly enlarged cardiac silhouette, unchanged. Thoracic aortic calcifications. Unchanged right hemidiaphragm elevation. No focal consolidation. No pleural effusion or pneumothorax. Osseous structures are unchanged.     Impression: No acute cardiopulmonary findings. Electronically Signed: Claus Sánchez MD  9/27/2024 5:44 PM EDT  Workstation ID: [id number]    XR Chest 1 View    Result Date: 9/16/2024  XR CHEST 1 VW Date of Exam: 9/16/2024 7:35 PM EDT Indication: Cough, persistent Trauma cough Comparison: 8/23/2024 CT Findings: Right approach central venous catheter with distal tip overlying the superior cavoatrial junction. Unchanged cardiomediastinal silhouette. Low lung volumes. Elevated right hemidiaphragm. No focal airspace opacity, pleural effusion, or pneumothorax. No acute osseous abnormality.     Impression: Low lung volumes with no focal airspace consolidation. Electronically Signed: Devante Lang MD  9/16/2024 8:21 PM EDT  Workstation ID: [id number]    XR Ankle 3+ View Right    Result Date: 9/16/2024  XR ANKLE 3+ VW RIGHT Date of Exam: 9/16/2024 7:01 PM EDT Indication: Fall Comparison: Right ankle radiographs 6/20/2023 Findings: No acute fracture or malalignment. The ankle mortise and syndesmosis are congruent. Unchanged appearing chronic distal fibular and calcaneal fractures. Single screw placement in the calcaneus. Mild degenerative changes at the ankle joint and midfoot. Vascular calcifications. Posterior calcaneal enthesopathy.     Impression:  No acute osseous abnormality. Electronically Signed: Taz Bragaclaudia  9/16/2024 7:40 PM EDT  Workstation ID: [id number]    XR Sacrum & Coccyx    Result Date: 9/16/2024  XR SACRUM AND COCCYX Date of Exam: 9/16/2024 7:01 PM EDT Indication: fall Comparison: None available. Findings: There is no definite evidence of sacral/coccyx fracture. Osteopenia limits evaluation. There is partially visualized lumbar spondylosis. Right hip arthroplasty partially visualized. Soft tissues are unremarkable.     Impression: No definite evidence of acute fracture of the sacrum or coccyx Electronically Signed: Devante Lang MD  9/16/2024 7:39 PM EDT  Workstation ID: [id number]      LAB RESULTS:      Lab 10/03/24  0514 10/02/24  0526 10/01/24  0537 09/30/24  0744 09/30/24  0606 09/29/24  0551 09/28/24  1042 09/28/24  0400   WBC 6.13 5.63 6.86  --  6.59 7.49  --  7.95   HEMOGLOBIN 9.7* 9.2* 8.9*  --  9.1* 9.8*  --  10.8*   HEMATOCRIT 30.4* 29.2* 29.0*  --  29.3* 31.7*  --  34.6*   PLATELETS 126* 105* 106*  --  105* 100*  --  103*   NEUTROS ABS 4.00 3.62 4.57  --  4.67 4.96  --  6.03   IMMATURE GRANS (ABS) 0.02 0.04 0.02  --  0.04 0.04  --  0.05   LYMPHS ABS 1.10 1.07 1.34  --  1.15 1.51  --  1.06   MONOS ABS 0.95* 0.73 0.84  --  0.65 0.90  --  0.78   EOS ABS 0.02 0.15 0.07  --  0.07 0.05  --  0.02   MCV 93.5 94.5 96.3  --  95.1 97.5*  --  95.6   SED RATE  --   --   --   --   --   --   --  75*   CRP  --   --   --   --   --   --   --  4.44*   PROCALCITONIN  --   --   --  0.78*  --   --   --   --    LACTATE  --   --   --   --   --   --  1.6  --          Lab 10/03/24  0514 10/02/24  0526 10/01/24  0537 09/30/24 0606 09/29/24  0551   SODIUM 132* 128* 131* 128* 131*   POTASSIUM 4.1 4.4 4.4 4.5 4.6   CHLORIDE 90* 90* 93* 87* 90*   CO2 26.1 18.4* 21.9* 22.1 22.6   ANION GAP 15.9* 19.6* 16.1* 18.9* 18.4*   BUN 43* 70* 52* 93* 76*   CREATININE 5.41* 8.11* 6.87* 9.93* 8.25*   EGFR 10.2* 6.2* 7.6* 4.9* 6.1*   GLUCOSE 135* 147* 216* 191* 139*   CALCIUM 9.5  9.6 9.6 9.3 9.9   MAGNESIUM 1.7 2.1 1.8 2.0 2.1   PHOSPHORUS 5.0* 6.1* 4.3 6.7* 6.1*         Lab 10/02/24  0526 09/28/24  0126 09/27/24  1655   TOTAL PROTEIN 7.4 7.8 7.6   ALBUMIN 3.4* 4.1 4.2   GLOBULIN 4.0 3.7 3.4   ALT (SGPT) 18 12 12   AST (SGOT) 28 14 15   BILIRUBIN 0.5 0.7 0.6   ALK PHOS 67 79 95   LIPASE  --  34  --          Lab 09/28/24  0400 09/28/24  0126 09/27/24  1655   PROBNP 3,698.0* 3,555.0*  --    HSTROP T 75* 74* 73*                 Brief Urine Lab Results  (Last result in the past 365 days)        Color   Clarity   Blood   Leuk Est   Nitrite   Protein   CREAT   Urine HCG        04/09/24 0412 Yellow   Clear   Small (1+)   Negative   Negative   100 mg/dL (2+)                 Microbiology Results (last 10 days)       Procedure Component Value - Date/Time    Respiratory Panel PCR w/COVID-19(SARS-CoV-2) RA/TANIA/GAVIN/PAD/COR/NENA In-House, NP Swab in UTM/VTM, 2 HR TAT - Swab, Nasopharynx [[id number]]  (Normal) Collected: 09/28/24 1453    Lab Status: Final result Specimen: Swab from Nasopharynx Updated: 09/28/24 1549     ADENOVIRUS, PCR Not Detected     Coronavirus 229E Not Detected     Coronavirus HKU1 Not Detected     Coronavirus NL63 Not Detected     Coronavirus OC43 Not Detected     COVID19 Not Detected     Human Metapneumovirus Not Detected     Human Rhinovirus/Enterovirus Not Detected     Influenza A PCR Not Detected     Influenza B PCR Not Detected     Parainfluenza Virus 1 Not Detected     Parainfluenza Virus 2 Not Detected     Parainfluenza Virus 3 Not Detected     Parainfluenza Virus 4 Not Detected     RSV, PCR Not Detected     Bordetella pertussis pcr Not Detected     Bordetella parapertussis PCR Not Detected     Chlamydophila pneumoniae PCR Not Detected     Mycoplasma pneumo by PCR Not Detected    Narrative:      In the setting of a positive respiratory panel with a viral infection PLUS a negative procalcitonin without other underlying concern for bacterial infection, consider observing off  antibiotics or discontinuation of antibiotics and continue supportive care. If the respiratory panel is positive for atypical bacterial infection (Bordetella pertussis, Chlamydophila pneumoniae, or Mycoplasma pneumoniae), consider antibiotic de-escalation to target atypical bacterial infection.    COVID-19, FLU A/B, RSV PCR 1 HR TAT - Swab, Nasopharynx [[id number]]  (Normal) Collected: 09/27/24 1715    Lab Status: Final result Specimen: Swab from Nasopharynx Updated: 09/27/24 0217     COVID19 Not Detected     Influenza A PCR Not Detected     Influenza B PCR Not Detected     RSV, PCR Not Detected    Narrative:      Fact sheet for providers: https://www.fda.gov/media/551293/download    Fact sheet for patients: https://www.fda.gov/media/139085/download    Test performed by PCR.          Results for orders placed during the hospital encounter of 07/01/24    Duplex Initial Vein Mapping Hemodialysis Access Bilateral CAR    Interpretation Summary    The right cephalic vein is patent and of adequate size in the upper arm.    The right basilic vein is patent and of adequate size in the upper arm.    The left cephalic vein is patent and of adequate size in the upper arm.    The left basilic vein is patent and of adequate size in the upper arm.    The right radial artery is patent and of adequate size.    The right brachial artery is patent and of adequate size.    The left radial artery is patent and of adequate size.    The left brachial artery is patent and of adequate size.    Results for orders placed during the hospital encounter of 07/01/24    Duplex Initial Vein Mapping Hemodialysis Access Bilateral CAR    Interpretation Summary    The right cephalic vein is patent and of adequate size in the upper arm.    The right basilic vein is patent and of adequate size in the upper arm.    The left cephalic vein is patent and of adequate size in the upper arm.    The left basilic vein is patent and of adequate size in the upper  arm.    The right radial artery is patent and of adequate size.    The right brachial artery is patent and of adequate size.    The left radial artery is patent and of adequate size.    The left brachial artery is patent and of adequate size.    Results for orders placed during the hospital encounter of 09/28/24    Adult Transthoracic Echo Complete W/ Cont if Necessary Per Protocol    Interpretation Summary    Left ventricular ejection fraction appears to be 56 - 60%.    Left ventricular wall thickness is consistent with mild concentric hypertrophy.    Left ventricular diastolic function is consistent with (grade I) impaired relaxation.    There is calcification of the aortic valve.No significant stenosis.      Time spent on Discharge including face to face service: Greater than 30 minutes      Electronically signed by Dayton Marcus MD, 10/03/24, 3:26 PM EDT.

## 2024-10-03 NOTE — PROGRESS NOTES
" LOS: 4 days   Patient Care Team:  Domingo Bravo MD as PCP - General (Internal Medicine)  Josephine Warren APRN as Nurse Practitioner (Pulmonary Disease)  Virginie Lopez APRN as Nurse Practitioner (Pulmonary Disease)    Chief Complaint: ESRD    Subjective     History of Present Illness  Pt with some confusion today but awake and alert.     Denies shortness of breath.  Denied pain  Stated he ate well yesterday.    Subjective:  Symptoms:  Stable.  He reports weakness.  No shortness of breath or chest pain.    Diet:  Adequate intake.  No nausea or vomiting.    Activity level: Impaired due to weakness.        History taken from: patient chart RN    Objective     Vital Sign Min/Max for last 24 hours  Temp  Min: 97.3 °F (36.3 °C)  Max: 98.4 °F (36.9 °C)   BP  Min: 139/89  Max: 178/75   Pulse  Min: 65  Max: 95   Resp  Min: 16  Max: 18   SpO2  Min: 91 %  Max: 96 %   Flow (L/min)  Min: 1.5  Max: 2   No data recorded     Flowsheet Rows      Flowsheet Row First Filed Value   Admission Height 167.6 cm (66\") Documented at 09/28/2024 0113   Admission Weight 77.2 kg (170 lb 3.1 oz) Documented at 09/28/2024 0330            No intake/output data recorded.  I/O last 3 completed shifts:  In: 360 [P.O.:360]  Out: 2700     Objective:  General Appearance:  Comfortable and not in pain.    Vital signs: (most recent): Blood pressure 153/75, pulse 75, temperature 98.4 °F (36.9 °C), temperature source Oral, resp. rate 18, height 167.6 cm (66\"), weight 77.5 kg (170 lb 13.7 oz), SpO2 92%.  Vital signs are normal.  No fever.    HEENT: Normal HEENT exam.    Lungs:  Normal effort and normal respiratory rate.    Heart: Normal rate.  Regular rhythm.    Chest: (Right IJ TDC.)  Abdomen: Abdomen is soft.  Bowel sounds are normal.   There is no abdominal tenderness.     Extremities: Normal range of motion.  There is dependent edema.  (Trace edema, left upper extremity AV fistula with some bruising.)  Neurological: Patient is alert and oriented to " "person, place and time.    Pupils:  Pupils are equal, round, and reactive to light.    Skin:  Warm and dry.                Results Review:     I reviewed the patient's new clinical results.  I reviewed the patient's new imaging results and agree with the interpretation.  I reviewed the patient's other test results and agree with the interpretation    WBC WBC   Date Value Ref Range Status   10/03/2024 6.13 3.40 - 10.80 10*3/mm3 Final   10/02/2024 5.63 3.40 - 10.80 10*3/mm3 Final   10/01/2024 6.86 3.40 - 10.80 10*3/mm3 Final      HGB Hemoglobin   Date Value Ref Range Status   10/03/2024 9.7 (L) 13.0 - 17.7 g/dL Final   10/02/2024 9.2 (L) 13.0 - 17.7 g/dL Final   10/01/2024 8.9 (L) 13.0 - 17.7 g/dL Final      HCT Hematocrit   Date Value Ref Range Status   10/03/2024 30.4 (L) 37.5 - 51.0 % Final   10/02/2024 29.2 (L) 37.5 - 51.0 % Final   10/01/2024 29.0 (L) 37.5 - 51.0 % Final      Platlets No results found for: \"LABPLAT\"   MCV MCV   Date Value Ref Range Status   10/03/2024 93.5 79.0 - 97.0 fL Final   10/02/2024 94.5 79.0 - 97.0 fL Final   10/01/2024 96.3 79.0 - 97.0 fL Final          Sodium Sodium   Date Value Ref Range Status   10/03/2024 132 (L) 136 - 145 mmol/L Final   10/02/2024 128 (L) 136 - 145 mmol/L Final   10/01/2024 131 (L) 136 - 145 mmol/L Final      Potassium Potassium   Date Value Ref Range Status   10/03/2024 4.1 3.5 - 5.2 mmol/L Final   10/02/2024 4.4 3.5 - 5.2 mmol/L Final   10/01/2024 4.4 3.5 - 5.2 mmol/L Final      Chloride Chloride   Date Value Ref Range Status   10/03/2024 90 (L) 98 - 107 mmol/L Final   10/02/2024 90 (L) 98 - 107 mmol/L Final   10/01/2024 93 (L) 98 - 107 mmol/L Final      CO2 CO2   Date Value Ref Range Status   10/03/2024 26.1 22.0 - 29.0 mmol/L Final   10/02/2024 18.4 (L) 22.0 - 29.0 mmol/L Final   10/01/2024 21.9 (L) 22.0 - 29.0 mmol/L Final      BUN BUN   Date Value Ref Range Status   10/03/2024 43 (H) 8 - 23 mg/dL Final   10/02/2024 70 (H) 8 - 23 mg/dL Final   10/01/2024 52 (H) " "8 - 23 mg/dL Final      Creatinine Creatinine   Date Value Ref Range Status   10/03/2024 5.41 (H) 0.76 - 1.27 mg/dL Final   10/02/2024 8.11 (H) 0.76 - 1.27 mg/dL Final   10/01/2024 6.87 (H) 0.76 - 1.27 mg/dL Final      Calcium Calcium   Date Value Ref Range Status   10/03/2024 9.5 8.6 - 10.5 mg/dL Final   10/02/2024 9.6 8.6 - 10.5 mg/dL Final   10/01/2024 9.6 8.6 - 10.5 mg/dL Final      PO4 No results found for: \"CAPO4\"   Albumin Albumin   Date Value Ref Range Status   10/02/2024 3.4 (L) 3.5 - 5.2 g/dL Final      Magnesium Magnesium   Date Value Ref Range Status   10/03/2024 1.7 1.6 - 2.4 mg/dL Final   10/02/2024 2.1 1.6 - 2.4 mg/dL Final   10/01/2024 1.8 1.6 - 2.4 mg/dL Final      Uric Acid No results found for: \"URICACID\"     Medication Review:   apixaban, 5 mg, Oral, Q12H  arformoterol, 15 mcg, Nebulization, BID - RT  ARIPiprazole, 2 mg, Oral, BID  aspirin, 324 mg, Oral, Once  atorvastatin, 40 mg, Oral, Daily  budesonide, 0.5 mg, Nebulization, BID - RT  carvedilol, 25 mg, Oral, BID With Meals  epoetin trevor/trevor-epbx, 5,000 Units, Intravenous, Once per day on Monday Wednesday Friday  insulin lispro, 2-9 Units, Subcutaneous, 4x Daily AC & at Bedtime  ipratropium, 0.5 mg, Nebulization, BID - RT  levothyroxine, 175 mcg, Oral, Q AM  Lidocaine, 1 patch, Transdermal, Daily  oxymetazoline, 2 spray, Each Nare, BID  pantoprazole, 40 mg, Oral, Daily  sertraline, 100 mg, Oral, Nightly  sevelamer, 1,600 mg, Oral, TID With Meals  sodium chloride, 10 mL, Intravenous, Q12H  torsemide, 100 mg, Oral, Daily          Assessment & Plan       Intractable pain    Abdominal pain      Assessment & Plan  1.  ESRD: Volume status is adequate.  Discussed with primary, nursing staff and his daughter.  Planned for dialysis again today and UF 2 kg as tolerated.  Was in the process of getting training to do HHD.  Will continue m/w/f schedule.     2.  Anemia: Hemoglobin decreased some, started EPO with dialysis follow trends     3.  History of " diabetes mellitus: Treatment per primary     4.  Abdominal pain/chest pain: Further workup and treatment per primary, CT scan showed large right renal pelvis stone, unsure if contributing to his pain, currently pain-free.  MRI of lumbar and thoracic spine compatible with DJD.     5.  Hypertension: Blood pressure up-and-down, continue current medications and as needed clonidine.  2D echo showed mild LVH with grade 1 diastolic dysfunction.     6.  History of hypothyroidism on Synthroid, per primary.     7.  Hyponatremia: Secondary to water intake and renal failure will improve with each dialysis, continue 1500 cc p.o. fluid restriction per day.       8.  Hyperphosphatemia: Continue phosphate binder but adjust dosage and time with meals, continue low phosphorus diet.    9.  Confusion, seems better.    Rolf Cha MD  10/03/24  08:52 EDT

## 2024-10-03 NOTE — PLAN OF CARE
Goal Outcome Evaluation:  Plan of Care Reviewed With: patient        Progress: improving  Outcome Evaluation: VSS. Patient is AAO x3 with intermittent confusion. Patient refused a few of morning medications. Dialysis completed throughout shift, 1900 mL drained. 6 minute walk test completed. Patient is discharging home with daughter. No other concerns or complaints.

## 2024-10-03 NOTE — PLAN OF CARE
Goal Outcome Evaluation:  Plan of Care Reviewed With: patient        Progress: improving  Outcome Evaluation: AOx4 in the evening. Vitals stable. Remains on 2L NC. Tylenol given for pain. Daughter at bedside overnight. Pt c/o nausea. Hospitalist notified and ordered zofran translingual. By the time RN returned with medication, pt was asleep. Daughter requested to let pt sleep and reassess again when awake. Will continue to monitor.

## 2024-10-03 NOTE — SIGNIFICANT NOTE
10/03/24 1454   OTHER   Discipline occupational therapist   Rehab Time/Intention   Session Not Performed patient/family declined treatment

## 2024-10-03 NOTE — PROGRESS NOTES
Respiratory Therapist Broncho-Pulmonary Hygiene Progress Note      Patient Name:  Nelson Wang  YOB: 1945    Nelson Wang meets the qualification for Level 1 of the Bronco-Pulmonary Hygiene Protocol. This was based on my daily patient assessment and includes review of chest x-ray results, cough ability and quality, oxygenation, secretions or risk for secretion development and patient mobility.     Broncho-Pulmonary Hygiene Assessment:    Level of Movement: Actively changing positions-requires assistance  Disoriented/Follows Commands    Breath Sounds: Clear to slightly diminished    Cough: Strong, effective    Chest X-Ray: Possible signs of consolidation and/or atelectasis or clear.     Sputum Productions: None or small amount of thin or watery secretions with effective cough    History and Physical: Chronic condition    SpO2 to Oxygen Need: greater than 92% on room air or  less than 3L nasal canula    Current SpO2 is: 91% on Room air     Based on this information, I have completed the following interventions: Teach/Instruct patient on cough and deep breathe      Electronically signed by Lesley Sanchez RRT, 10/03/24, 6:45 AM EDT.

## 2024-10-03 NOTE — NURSING NOTE
Duration of Treatment 3.5 Hours   Access Site Tunneled Dialysis Catheter   Dialyzer Revaclear    mL/min   Dialysate Temperature (C) 36   Use Crit-Line? No   BFR-As tolerated to a maximum of: 400 mL/min   Dialysate Solution Bath: K+ = 3 mEq, Ca = 2.5mEq   Bicarb 35 mEq   Na+ 137 meq   Fluid Removal: 2kg     Patient tolerated well. Slept on and off. The end of the treatment the pt became restless and had a hard time keeping his arm straight. Treatment ended 15min early to avoid clotting.    Removed: 1900  BLP: 83.2  Time: 3:44

## 2024-10-03 NOTE — OUTREACH NOTE
Prep Survey      Flowsheet Row Responses   [facility] facility patient discharged from? [facility]   Is LACE score < 7 ? No   Eligibility [facility]   Date of Admission 09/28/24   Date of Discharge 10/03/24   Discharge Disposition Home or Self Care   Discharge diagnosis Intractable pain   Does the patient have one of the following disease processes/diagnoses(primary or secondary)? Other   Does the patient have Home health ordered? No   Is there a DME ordered? No   General alerts for this patient ESRD on HD   Prep survey completed? Yes            HUYEN KENNEY - Registered Nurse

## 2024-10-04 ENCOUNTER — TRANSITIONAL CARE MANAGEMENT TELEPHONE ENCOUNTER (OUTPATIENT)
Dept: CALL CENTER | Facility: HOSPITAL | Age: 79
End: 2024-10-04
Payer: MEDICARE

## 2024-10-04 LAB
MYCOBACTERIUM SPEC CULT: NORMAL
MYCOBACTERIUM SPEC CULT: NORMAL
NIGHT BLUE STAIN TISS: NORMAL

## 2024-10-04 NOTE — OUTREACH NOTE
Call Center TCM Note      Flowsheet Row Responses   [facility] patient discharged from? [facility]   Does the patient have one of the following disease processes/diagnoses(primary or secondary)? Other   TCM attempt successful? No   Unsuccessful attempts Attempt 1  [Dimpi on verbal release-no answer]            Laura Marcelino RN    10/4/2024, 09:53 EDT

## 2024-10-04 NOTE — OUTREACH NOTE
Call Center TCM Note      Flowsheet Row Responses   [facility] patient discharged from? [facility]   Does the patient have one of the following disease processes/diagnoses(primary or secondary)? Other   TCM attempt successful? No   Unsuccessful attempts Attempt 2            Laura Marcelino RN    10/4/2024, 11:43 EDT

## 2024-10-05 ENCOUNTER — TRANSITIONAL CARE MANAGEMENT TELEPHONE ENCOUNTER (OUTPATIENT)
Dept: CALL CENTER | Facility: HOSPITAL | Age: 79
End: 2024-10-05
Payer: MEDICARE

## 2024-10-05 NOTE — OUTREACH NOTE
Call Center TCM Note      Flowsheet Row Responses   [facility] patient discharged from? [facility]   Does the patient have one of the following disease processes/diagnoses(primary or secondary)? Other   TCM attempt successful? No   Unsuccessful attempts Attempt 3            Sheela Spaulding RN    10/5/2024, 09:37 EDT

## 2024-10-08 ENCOUNTER — APPOINTMENT (OUTPATIENT)
Dept: GENERAL RADIOLOGY | Facility: HOSPITAL | Age: 79
End: 2024-10-08
Payer: MEDICARE

## 2024-10-08 ENCOUNTER — APPOINTMENT (OUTPATIENT)
Dept: CT IMAGING | Facility: HOSPITAL | Age: 79
End: 2024-10-08
Payer: MEDICARE

## 2024-10-08 ENCOUNTER — HOSPITAL ENCOUNTER (EMERGENCY)
Facility: HOSPITAL | Age: 79
Discharge: HOME | End: 2024-10-08
Attending: EMERGENCY MEDICINE | Admitting: EMERGENCY MEDICINE
Payer: MEDICARE

## 2024-10-08 VITALS
HEIGHT: 67 IN | HEART RATE: 76 BPM | RESPIRATION RATE: 18 BRPM | WEIGHT: 166.01 LBS | SYSTOLIC BLOOD PRESSURE: 139 MMHG | OXYGEN SATURATION: 97 % | TEMPERATURE: 97.8 F | DIASTOLIC BLOOD PRESSURE: 63 MMHG | BODY MASS INDEX: 26.06 KG/M2

## 2024-10-08 DIAGNOSIS — R06.00 DYSPNEA, UNSPECIFIED TYPE: ICD-10-CM

## 2024-10-08 DIAGNOSIS — R06.02 SHORTNESS OF BREATH: Primary | ICD-10-CM

## 2024-10-08 LAB
ALBUMIN SERPL-MCNC: 3.5 G/DL (ref 3.5–5.2)
ALBUMIN/GLOB SERPL: 0.9 G/DL
ALP SERPL-CCNC: 67 U/L (ref 39–117)
ALT SERPL W P-5'-P-CCNC: 8 U/L (ref 1–41)
ANION GAP SERPL CALCULATED.3IONS-SCNC: 11.5 MMOL/L (ref 5–15)
AST SERPL-CCNC: 13 U/L (ref 1–40)
BASOPHILS # BLD AUTO: 0.04 10*3/MM3 (ref 0–0.2)
BASOPHILS NFR BLD AUTO: 0.7 % (ref 0–1.5)
BILIRUB SERPL-MCNC: 0.6 MG/DL (ref 0–1.2)
BUN SERPL-MCNC: 33 MG/DL (ref 8–23)
BUN/CREAT SERPL: 7.3 (ref 7–25)
CALCIUM SPEC-SCNC: 9.5 MG/DL (ref 8.6–10.5)
CHLORIDE SERPL-SCNC: 88 MMOL/L (ref 98–107)
CK SERPL-CCNC: 80 U/L (ref 20–200)
CO2 SERPL-SCNC: 31.5 MMOL/L (ref 22–29)
CREAT SERPL-MCNC: 4.51 MG/DL (ref 0.76–1.27)
DEPRECATED RDW RBC AUTO: 51.3 FL (ref 37–54)
EGFRCR SERPLBLD CKD-EPI 2021: 12.6 ML/MIN/1.73
EOSINOPHIL # BLD AUTO: 0.11 10*3/MM3 (ref 0–0.4)
EOSINOPHIL NFR BLD AUTO: 1.9 % (ref 0.3–6.2)
ERYTHROCYTE [DISTWIDTH] IN BLOOD BY AUTOMATED COUNT: 14.9 % (ref 12.3–15.4)
FLUAV SUBTYP SPEC NAA+PROBE: NOT DETECTED
FLUBV RNA ISLT QL NAA+PROBE: NOT DETECTED
GLOBULIN UR ELPH-MCNC: 4.1 GM/DL
GLUCOSE SERPL-MCNC: 117 MG/DL (ref 65–99)
HCT VFR BLD AUTO: 29.8 % (ref 37.5–51)
HGB BLD-MCNC: 9.3 G/DL (ref 13–17.7)
HOLD SPECIMEN: NORMAL
HOLD SPECIMEN: NORMAL
IMM GRANULOCYTES # BLD AUTO: 0.09 10*3/MM3 (ref 0–0.05)
IMM GRANULOCYTES NFR BLD AUTO: 1.6 % (ref 0–0.5)
LYMPHOCYTES # BLD AUTO: 1.22 10*3/MM3 (ref 0.7–3.1)
LYMPHOCYTES NFR BLD AUTO: 21.5 % (ref 19.6–45.3)
MCH RBC QN AUTO: 29.6 PG (ref 26.6–33)
MCHC RBC AUTO-ENTMCNC: 31.2 G/DL (ref 31.5–35.7)
MCV RBC AUTO: 94.9 FL (ref 79–97)
MONOCYTES # BLD AUTO: 0.88 10*3/MM3 (ref 0.1–0.9)
MONOCYTES NFR BLD AUTO: 15.5 % (ref 5–12)
NEUTROPHILS NFR BLD AUTO: 3.34 10*3/MM3 (ref 1.7–7)
NEUTROPHILS NFR BLD AUTO: 58.8 % (ref 42.7–76)
NRBC BLD AUTO-RTO: 0 /100 WBC (ref 0–0.2)
NT-PROBNP SERPL-MCNC: ABNORMAL PG/ML (ref 0–1800)
PLATELET # BLD AUTO: 167 10*3/MM3 (ref 140–450)
PMV BLD AUTO: 10.2 FL (ref 6–12)
POTASSIUM SERPL-SCNC: 3.9 MMOL/L (ref 3.5–5.2)
PROT SERPL-MCNC: 7.6 G/DL (ref 6–8.5)
QT INTERVAL: 397 MS
QTC INTERVAL: 437 MS
RBC # BLD AUTO: 3.14 10*6/MM3 (ref 4.14–5.8)
RSV RNA NPH QL NAA+NON-PROBE: NOT DETECTED
SARS-COV-2 RNA RESP QL NAA+PROBE: NOT DETECTED
SODIUM SERPL-SCNC: 131 MMOL/L (ref 136–145)
TROPONIN T SERPL HS-MCNC: 97 NG/L
WBC NRBC COR # BLD AUTO: 5.68 10*3/MM3 (ref 3.4–10.8)
WHOLE BLOOD HOLD COAG: NORMAL
WHOLE BLOOD HOLD SPECIMEN: NORMAL

## 2024-10-08 PROCEDURE — 83036 HEMOGLOBIN GLYCOSYLATED A1C: CPT | Performed by: STUDENT IN AN ORGANIZED HEALTH CARE EDUCATION/TRAINING PROGRAM

## 2024-10-08 PROCEDURE — 84443 ASSAY THYROID STIM HORMONE: CPT | Performed by: STUDENT IN AN ORGANIZED HEALTH CARE EDUCATION/TRAINING PROGRAM

## 2024-10-08 PROCEDURE — 83880 ASSAY OF NATRIURETIC PEPTIDE: CPT | Performed by: EMERGENCY MEDICINE

## 2024-10-08 PROCEDURE — 84439 ASSAY OF FREE THYROXINE: CPT | Performed by: STUDENT IN AN ORGANIZED HEALTH CARE EDUCATION/TRAINING PROGRAM

## 2024-10-08 PROCEDURE — 99284 EMERGENCY DEPT VISIT MOD MDM: CPT

## 2024-10-08 PROCEDURE — 70450 CT HEAD/BRAIN W/O DYE: CPT

## 2024-10-08 PROCEDURE — 84484 ASSAY OF TROPONIN QUANT: CPT | Performed by: EMERGENCY MEDICINE

## 2024-10-08 PROCEDURE — 80053 COMPREHEN METABOLIC PANEL: CPT

## 2024-10-08 PROCEDURE — 93005 ELECTROCARDIOGRAM TRACING: CPT

## 2024-10-08 PROCEDURE — 82306 VITAMIN D 25 HYDROXY: CPT | Performed by: STUDENT IN AN ORGANIZED HEALTH CARE EDUCATION/TRAINING PROGRAM

## 2024-10-08 PROCEDURE — 36415 COLL VENOUS BLD VENIPUNCTURE: CPT

## 2024-10-08 PROCEDURE — 71045 X-RAY EXAM CHEST 1 VIEW: CPT

## 2024-10-08 PROCEDURE — 82550 ASSAY OF CK (CPK): CPT | Performed by: EMERGENCY MEDICINE

## 2024-10-08 PROCEDURE — 93005 ELECTROCARDIOGRAM TRACING: CPT | Performed by: EMERGENCY MEDICINE

## 2024-10-08 PROCEDURE — 87637 SARSCOV2&INF A&B&RSV AMP PRB: CPT | Performed by: EMERGENCY MEDICINE

## 2024-10-08 PROCEDURE — 71250 CT THORAX DX C-: CPT

## 2024-10-08 PROCEDURE — 85025 COMPLETE CBC W/AUTO DIFF WBC: CPT

## 2024-10-08 RX ORDER — SODIUM CHLORIDE 0.9 % (FLUSH) 0.9 %
10 SYRINGE (ML) INJECTION AS NEEDED
Status: DISCONTINUED | OUTPATIENT
Start: 2024-10-08 | End: 2024-10-08 | Stop reason: HOSPADM

## 2024-10-08 NOTE — PROGRESS NOTES
Transitional Care Follow Up Visit  Subjective     Nelson Wang is a 79 y.o. male who presents for a transitional care management visit.    Within 48 business hours after discharge our office contacted him via telephone to coordinate his care and needs.      I reviewed and discussed the details of that call along with the discharge summary, hospital problems, inpatient lab results, inpatient diagnostic studies, and consultation reports with Nelson.     Current outpatient and discharge medications have been reconciled for the patient.  Reviewed by: Domingo Bravo MD          10/3/2024     5:26 PM   Date of TCM Phone Call   [facility]   Date of Admission 9/28/2024   Date of Discharge 10/3/2024   Discharge Disposition Home or Self Care     Risk for Readmission (LACE) Score: 14 (10/3/2024  6:00 AM)      History of Present Illness     Course During Hospital Stay:  5     The following portions of the patient's history were reviewed and updated as appropriate: allergies, current medications, past family history, past medical history, past social history, past surgical history, and problem list.      Hospital Course:    Nelson Wang is a 79 y.o. male with history of diabetes, ESRD on HD, psoriasis, hypertension, dyslipidemia, mild CAD, restrictive lung disease, vertigo, GERD without esophagitis, E. coli bacteremia with septic shock, C. difficile colitis and previous tobacco use who presented with chest pain and abdominal pain. Pain was worsening throughout the day and aggravated with movement. CT abdomen and pelvis along with chest was performed on presentation which showed Interval resolution of previously described 10 mm left lower lobe nodule, some mild atelectasis; colonic diverticulosis without diverticulitis. Surgically absent appendix; Large calculus is again identified in the right renal pelvis; bilateral renal cortical thinning with bilateral renal cysts. Also showed lumbar degenerative disease with  chronic compression fractures at L3 and T12 as well as at T10. Patient was started on pain medications and admitted for further evaluation and management.  Patient underwent echocardiogram which was significant for an EF of 56 to 60%, mild concentric hypertrophy, grade 1 diastolic dysfunction. Has chronic compression fracture but also having back pain; MRI of thoracic and lumbar region showed degenerative changes and old T12 and L3 compression fracture; nothing acute, pain did improve.  Patient had pulmonary edema while in the hospital, workup was negative for pneumonia.  Patient's ultrafiltration was increased with improvement of his symptoms, he was weaned to room air, patient underwent 6-minute walk test for which he passed.  Patient is being discharged home today in stable condition.  Patient should follow-up with his PCP in 3 to 7 days.  Patient is being discharged on Eliquis for stroke prevention.  Patient should continue to follow-up with nephrology as scheduled.  Patient is in the process of getting set up for home hemodialysis.  Patient is discharged home today under the care of his daughter.    Interim Course:    Seen in ER yesterday with cough and SOA.  Had reassuring exam, with reassuring labs as well as CT chest and head.    His daughter (Dr. Crista Wang, who accompanies him today and is the primary historian) reports that he has been confused at times since discharge.   She denies sundowning.    She is currently in the process of learning to administer his home dialysis.    His previously scheduled scope will need to be rescheduled (EGD and colonoscopy would be expected to be performed).  He is taking protonix BID.        Objective   Vitals:    10/09/24 1402   BP: 109/58   Pulse: 71   Temp: 98.4 °F (36.9 °C)   SpO2: (!) 87%       Appearance: No acute distress, well-nourished  Head: normocephalic, atraumatic  Eyes: no scleral icterus, no conjunctival injection  Ears, Nose, and Throat: external ears  normal  Cardiovascular: regular rate and rhythm. no murmurs, rubs, or gallops. no edema.  Audible bruit on LUE AV fistula  Respiratory: breathing comfortably, symmetric chest rise, clear to auscultation bilaterally. No wheezes, rales, or rhonchi.  GI: soft, NTTP, no masses or HSM  Neuro: alert and oriented to time, place, and person.    Result Review :   The following data was reviewed by: Domingo Bravo MD on 10/09/2024:  Common labs          10/2/2024    05:26 10/3/2024    05:14 10/8/2024    15:00   Common Labs   Glucose 147  135  117    BUN 70  43  33    Creatinine 8.11  5.41  4.51    Sodium 128  132  131    Potassium 4.4  4.1  3.9    Chloride 90  90  88    Calcium 9.6  9.5  9.5    Albumin 3.4   3.5    Total Bilirubin 0.5   0.6    Alkaline Phosphatase 67   67    AST (SGOT) 28   13    ALT (SGPT) 18   8    WBC 5.63  6.13  5.68    Hemoglobin 9.2  9.7  9.3    Hematocrit 29.2  30.4  29.8    Platelets 105  126  167        No orders to display            Lab Results   Component Value Date    COVID19 Not Detected 10/08/2024    RSV Not Detected 10/08/2024    INR 0.96 05/28/2024    BILIRUBINUR Negative 04/09/2024       Assessment & Plan     Diagnoses and all orders for this visit:    1. ESRD (end stage renal disease) on dialysis (Primary)  -     Vitamin D,25-Hydroxy  -     Cancel: PTH, Intact  -     Oxygen Therapy  -     Cholecalciferol 20 MCG (800 UNIT) tablet; Take 1 tablet by mouth Daily.  Dispense: 75 tablet; Refill: 0    2. Essential hypertension    3. Acquired hypothyroidism  -     TSH  -     T4, Free    4. Intractable back pain  -     Vitamin D,25-Hydroxy    5. Epigastric pain    6. New onset atrial fibrillation    7. Compression fracture of T12 vertebra, sequela  -     Vitamin D,25-Hydroxy  -     Cholecalciferol 20 MCG (800 UNIT) tablet; Take 1 tablet by mouth Daily.  Dispense: 75 tablet; Refill: 0    8. Type 2 diabetes mellitus without complication, with long-term current use of insulin  -     Hemoglobin  A1c    9. Chronic respiratory failure with hypoxia  -     Oxygen Therapy      Hypoxia:  -failed walk test today  -oxygen ordered    HTN:  -BP at goal of < 130/80    There are no discontinued medications.    Return in about 2 months (around 12/9/2024) for Medicare Wellness.           Domingo Bravo MD  10/09/24  14:57 EDT

## 2024-10-08 NOTE — ED PROVIDER NOTES
Time: 2:52 PM EDT  Date of encounter:  10/8/2024  Independent Historian/Clinical History and Information was obtained by:   Patient, daughter    History is limited by: N/A    Chief Complaint   Patient presents with    Cough     Cough, soa and jerking, he had dialysis today, sats were in 80s and put on 02 at 2L now 95,          History of Present Illness:  Patient is a 79 y.o. year old male who presents to the emergency department for evaluation of shortness of breath and confusion. Patient was recently in the hospital for soa, rib pain, pulmonary edema. Work-up was negative for pneumonia. Patient did get better with dialysis in the hospital. He was wearing 2LO2 during his stay but sent home with no oxygen since he passed the walk test. He has been doing at-home dialysis. This is Dr. Wang's father. She is worried that patient has aspirated pneumonia since he does not like to take thickened fluids. (Triage Provider: Hector Chandler PA-C).      Patient Care Team  Primary Care Provider: Domingo Bravo MD    Past Medical History:     No Known Allergies  Past Medical History:   Diagnosis Date    Abnormal stress test     Anemia     IRON INFUSIONS WITH DIALYSIS    Anesthesia     WITH HIP REPLACEMENT DAUGHTER BELIEVES HAD SVT 2000    Ankle pain, right     Anxiety and depression     CKD (chronic kidney disease), stage IV     DIALYSIS [id number] SHILEY IN RIGHT CHEST    Diabetes     Gout     High cholesterol     History of peritoneal dialysis     HL (hearing loss)     Hyperkalemia     Hypertension     Hypothyroidism     Insomnia     Night terrors     Psoriasis     Psoriasis     Sleep walking     Thrombocytopenia     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    Vitiligo      Past Surgical History:   Procedure Laterality Date    ANKLE SURGERY Right 11/1990    APPENDECTOMY N/A 1954    ARTERIOVENOUS FISTULA/SHUNT SURGERY Left 07/16/2024    Procedure: Creation of left arm arteriovenous fistula;  Surgeon: Moses Mir MD;   Location: [facility] MAIN OR;  Service: Vascular;  Laterality: Left;    BRONCHOSCOPY N/A 03/01/2024    Procedure: BRONCHOSCOPY WITH BAL AND WASHINGS;  Surgeon: Sandra Espinal MD;  Location: [facility] ENDOSCOPY;  Service: Pulmonary;  Laterality: N/A;  PNEUMONIA    BRONCHOSCOPY N/A 08/30/2024    Procedure: BRONCHOSCOPY WITH BALS AND WASHINGS;  Surgeon: Sandra Espinal MD;  Location: [facility] ENDOSCOPY;  Service: Pulmonary;  Laterality: N/A;  MUCOUS PLUGGING    CARDIAC CATHETERIZATION N/A 05/29/2024    Procedure: Left Heart Cath with possible coronary angioplasty;  Surgeon: Stephan Nichols MD;  Location: [facility] CATH INVASIVE LOCATION;  Service: Cardiology;  Laterality: N/A;    COLONOSCOPY N/A 06/2022    [facility]    HIP BIPOLAR REPLACEMENT Right 01/2000    INSERTION HEMODIALYSIS CATHETER Left 04/09/2024    Procedure: HEMODIALYSIS CATHETER INSERTION;  Surgeon: Jose Berry MD;  Location: [facility] MAIN OR;  Service: General;  Laterality: Left;    INSERTION HEMODIALYSIS CATHETER N/A 04/12/2024    Procedure: Tunneled hemodialysis catheter insertion;  Surgeon: Enrique Vinson MD;  Location: [facility] MAIN OR;  Service: Vascular;  Laterality: N/A;    INSERTION PERITONEAL DIALYSIS CATHETER N/A 03/27/2023    Procedure: LAPAROSCOPIC INSERTION PERITONEAL DIALYSIS CATHETER, LAPAROSCOPIC OMENTOPEXY WITH LYSIS OF ADHESIONS;  Surgeon: Jose Berry MD;  Location: [facility] MAIN OR;  Service: General;  Laterality: N/A;    INSERTION PERITONEAL DIALYSIS CATHETER Left 07/23/2023    Procedure: REVISION OF PERITONEAL DIALYSIS CATHETER;  Surgeon: Radha Oreilly MD;  Location: [facility] MAIN OR;  Service: General;  Laterality: Left;    REMOVAL PERITONEAL DIALYSIS CATHETER N/A 04/09/2024    Procedure: REMOVAL PERITONEAL DIALYSIS CATHETER;  Surgeon: Jose Berry MD;  Location: [facility] MAIN OR;  Service: General;  Laterality: N/A;    RENAL BIOPSY Left 07/15/2022    UPPER GASTROINTESTINAL ENDOSCOPY      WRIST  SURGERY      UNSURE WHICH SIDE DAUGHTER REPORTS HAD SEVERE  CUT FROM WINDOW AND THEY HAD TO DO RECONSTRUCTIVE SURGERY WITH VESSELS AND NERVES     Family History   Problem Relation Age of Onset    Diabetes Mother     Heart disease Father     Colon cancer Sister 77    Cancer Sister 35    Diabetes Sister     Diabetes Brother     Sleep apnea Paternal Aunt     Heart disease Paternal Uncle     Sleep apnea Daughter     Malig Hyperthermia Neg Hx        Home Medications:  Prior to Admission medications    Medication Sig Start Date End Date Taking? Authorizing Provider   allopurinol (ZYLOPRIM) 100 MG tablet Take 1 tablet by mouth Daily. As needed 3/11/24   Serena Matute MD   apixaban (ELIQUIS) 5 MG tablet tablet Take 1 tablet by mouth Every 12 (Twelve) Hours for 30 days. Indications: Atrial Fibrillation 10/3/24 11/3/24  Dayton Marcus MD   ARIPiprazole (ABILIFY) 2 MG tablet Take 1 tablet by mouth 2 (Two) Times a Day. 9/10/24   Domingo Bravo MD   atorvastatin (LIPITOR) 40 MG tablet Take 1 tablet by mouth Daily. 7/17/24   Domingo Bravo MD   carvedilol (COREG) 25 MG tablet Take 1 tablet by mouth 2 (Two) Times a Day With Meals.    Serena Matute MD   cloNIDine (CATAPRES) 0.2 MG tablet Take 1 tablet by mouth Every 8 (Eight) Hours As Needed for High Blood Pressure. 6/18/24   Serena Matute MD   Insulin Glargine (LANTUS SOLOSTAR) 100 UNIT/ML injection pen Inject 10 Units under the skin into the appropriate area as directed Every Night. 4/14/24   Cristian Santacruz MD   Levothyroxine Sodium 175 MCG capsule Take 175 mcg by mouth Daily. 9/10/24   Domingo Bravo MD   lidocaine-prilocaine (EMLA) 2.5-2.5 % cream Apply 1 Application topically to the appropriate area as directed As Needed for Injection Site Pain. 8/30/24   Serena Matute MD   Lokelma 10 g packet Take 10 g by mouth Every Other Day. TAKES MON, WED, FRI, SUN 6/11/24   Serena Matute MD   Methoxy PEG-Epoetin Beta  (MIRCERA IJ) 30 mcg Every 14 (Fourteen) Days. RECEIVES DURING DIALYSIS 24  ProviderSerena MD   midodrine (PROAMATINE) 2.5 MG tablet Take 1 tablet by mouth 3 (Three) Times a Day Before Meals. On dialysis days  Patient taking differently: Take 1 tablet by mouth 3 (Three) Times a Day Before Meals. On dialysis days Mon , Tue ., Thurs., Fri 24   Aylin Nicole APRN   pantoprazole (PROTONIX) 40 MG EC tablet Take 1 tablet by mouth Daily. 24   Sandra Espinal MD   sertraline (ZOLOFT) 100 MG tablet Take 1 tablet by mouth Every Night. 24   Domingo Bravo MD   sevelamer (RENVELA) 800 MG tablet Take 2 tablets by mouth 3 (Three) Times a Day With Meals for 30 days. 10/3/24 11/2/24  Dayton Marcus MD   torsemide (DEMADEX) 100 MG tablet Take 1 tablet by mouth Daily. 24   Domingo Bravo MD        Social History:   Social History     Tobacco Use    Smoking status: Former     Current packs/day: 0.00     Average packs/day: 1 pack/day for 10.0 years (10.0 ttl pk-yrs)     Types: Cigarettes     Start date:      Quit date: 2000     Years since quittin.7     Passive exposure: Past    Smokeless tobacco: Never    Tobacco comments:     quit 25 years ago, chews nicotine gum in past   Vaping Use    Vaping status: Never Used   Substance Use Topics    Alcohol use: Yes     Comment: 1 DRINK/DAY-rarely    Drug use: Never         Review of Systems:  Review of Systems   Constitutional:  Negative for chills and fever.   HENT:  Negative for congestion, rhinorrhea and sore throat.    Eyes:  Negative for pain and visual disturbance.   Respiratory:  Positive for shortness of breath. Negative for apnea, cough and chest tightness.    Cardiovascular:  Negative for chest pain and palpitations.   Gastrointestinal:  Negative for abdominal pain, diarrhea, nausea and vomiting.   Genitourinary:  Negative for difficulty urinating and dysuria.   Musculoskeletal:  Negative for joint swelling and  "myalgias.   Skin:  Negative for color change.   Neurological:  Negative for seizures and headaches.   Psychiatric/Behavioral: Negative.     All other systems reviewed and are negative.       Physical Exam:  /63   Pulse 76   Temp 97.8 °F (36.6 °C) (Oral)   Resp 18   Ht 170.2 cm (67\")   Wt 75.3 kg (166 lb 0.1 oz)   SpO2 97%   BMI 26.00 kg/m²         Physical Exam  Vitals and nursing note reviewed.   Constitutional:       General: He is not in acute distress.     Appearance: Normal appearance. He is not toxic-appearing.   HENT:      Head: Normocephalic and atraumatic.      Jaw: There is normal jaw occlusion.   Eyes:      General: Lids are normal.      Extraocular Movements: Extraocular movements intact.      Conjunctiva/sclera: Conjunctivae normal.      Pupils: Pupils are equal, round, and reactive to light.   Cardiovascular:      Rate and Rhythm: Normal rate and regular rhythm.      Pulses: Normal pulses.      Heart sounds: Normal heart sounds.   Pulmonary:      Effort: Pulmonary effort is normal. No respiratory distress.      Breath sounds: Normal breath sounds. No wheezing or rhonchi.   Abdominal:      General: Abdomen is flat.      Palpations: Abdomen is soft.      Tenderness: There is no abdominal tenderness. There is no guarding or rebound.   Musculoskeletal:         General: Normal range of motion.      Cervical back: Normal range of motion and neck supple.      Right lower leg: No edema.      Left lower leg: No edema.   Skin:     General: Skin is warm and dry.   Neurological:      Mental Status: He is alert and oriented to person, place, and time. Mental status is at baseline.   Psychiatric:         Mood and Affect: Mood normal.                      Procedures:  Procedures      Medical Decision Making:      Comorbidities that affect care:    End-stage renal disease on dialysis    External Notes reviewed:    Hospital Discharge Summary: Patient was last admitted for new onset atrial " fibrillation      The following orders were placed and all results were independently analyzed by me:  Orders Placed This Encounter   Procedures    Oxygen Therapy    Oxygen Therapy    COVID-19, FLU A/B, RSV PCR 1 HR TAT - Swab, Nasopharynx    XR Chest 1 View    CT Head Without Contrast    CT Chest Without Contrast Diagnostic    [place] Draw    Comprehensive Metabolic Panel    BNP    Single High Sensitivity Troponin T    CBC Auto Differential    Urinalysis With Microscopic If Indicated (No Culture) - Urine, Clean Catch    CK    High Sensitivity Troponin T 2Hr    NPO Diet NPO Type: Strict NPO    Undress & Gown    Continuous Pulse Oximetry    Vital Signs    Oxygen Therapy- Nasal Cannula; Titrate 1-6 LPM Per SpO2; 90 - 95%    ECG 12 Lead ED Triage Standing Order; SOA    Insert Peripheral IV    CBC & Differential    Green Top (Gel)    Lavender Top    Gold Top - SST    Light Blue Top       Medications Given in the Emergency Department:  Medications   sodium chloride 0.9 % flush 10 mL (has no administration in time range)        ED Course:    The patient was initially evaluated in the triage area where orders were placed. The patient was later dispositioned by Erica Bain MD.      The patient was advised to stay for completion of workup which includes but is not limited to communication of labs and radiological results, reassessment and plan. The patient was advised that leaving prior to disposition by a provider could result in critical findings that are not communicated to the patient.     ED Course as of 10/08/24 1805   Tue Oct 08, 2024   1457 PROVIDER IN TRIAGE  Patient was evaluated by me, Hector Chandler PA-C. Orders were placed and awaiting final results and disposition.   [MV]      ED Course User Index  [MV] Hector Chandler PA       Labs:    Lab Results (last 24 hours)       Procedure Component Value Units Date/Time    CBC & Differential [[id number]]  (Abnormal) Collected: 10/08/24 1500    Specimen: Blood from Arm,  Right Updated: 10/08/24 1532    Narrative:      The following orders were created for panel order CBC & Differential.  Procedure                               Abnormality         Status                     ---------                               -----------         ------                     CBC Auto Differential[[id number]]        Abnormal            Final result                 Please view results for these tests on the individual orders.    Comprehensive Metabolic Panel [[id number]]  (Abnormal) Collected: 10/08/24 1500    Specimen: Blood from Arm, Right Updated: 10/08/24 1553     Glucose 117 mg/dL      BUN 33 mg/dL      Creatinine 4.51 mg/dL      Sodium 131 mmol/L      Potassium 3.9 mmol/L      Chloride 88 mmol/L      CO2 31.5 mmol/L      Calcium 9.5 mg/dL      Total Protein 7.6 g/dL      Albumin 3.5 g/dL      ALT (SGPT) 8 U/L      AST (SGOT) 13 U/L      Alkaline Phosphatase 67 U/L      Total Bilirubin 0.6 mg/dL      Globulin 4.1 gm/dL      A/G Ratio 0.9 g/dL      BUN/Creatinine Ratio 7.3     Anion Gap 11.5 mmol/L      eGFR 12.6 mL/min/1.73      Comment: <15 Indicative of kidney failure       Narrative:      GFR Normal >60  Chronic Kidney Disease <60  Kidney Failure <15    The GFR formula is only valid for adults with stable renal function between ages 18 and 70.    BNP [[id number]]  (Abnormal) Collected: 10/08/24 1500    Specimen: Blood from Arm, Right Updated: 10/08/24 1604     proBNP 51,228.0 pg/mL     Narrative:      This assay is used as an aid in the diagnosis of individuals suspected of having heart failure. It can be used as an aid in the diagnosis of acute decompensated heart failure (ADHF) in patients presenting with signs and symptoms of ADHF to the emergency department (ED). In addition, NT-proBNP of <300 pg/mL indicates ADHF is not likely.    Age Range Result Interpretation  NT-proBNP Concentration (pg/mL:      <50             Positive            >450                   Gray                 300-450                     Negative             <300    50-75           Positive            >900                  Gray                300-900                  Negative            <300      >75             Positive            >1800                  Gray                300-1800                  Negative            <300    Single High Sensitivity Troponin T [[id number]]  (Abnormal) Collected: 10/08/24 1500    Specimen: Blood from Arm, Right Updated: 10/08/24 1608     HS Troponin T 97 ng/L     Narrative:      High Sensitive Troponin T Reference Range:  <14.0 ng/L- Negative Female for AMI  <22.0 ng/L- Negative Male for AMI  >=14 - Abnormal Female indicating possible myocardial injury.  >=22 - Abnormal Male indicating possible myocardial injury.   Clinicians would have to utilize clinical acumen, EKG, Troponin, and serial changes to determine if it is an Acute Myocardial Infarction or myocardial injury due to an underlying chronic condition.         CBC Auto Differential [[id number]]  (Abnormal) Collected: 10/08/24 1500    Specimen: Blood from Arm, Right Updated: 10/08/24 1532     WBC 5.68 10*3/mm3      RBC 3.14 10*6/mm3      Hemoglobin 9.3 g/dL      Hematocrit 29.8 %      MCV 94.9 fL      MCH 29.6 pg      MCHC 31.2 g/dL      RDW 14.9 %      RDW-SD 51.3 fl      MPV 10.2 fL      Platelets 167 10*3/mm3      Neutrophil % 58.8 %      Lymphocyte % 21.5 %      Monocyte % 15.5 %      Eosinophil % 1.9 %      Basophil % 0.7 %      Immature Grans % 1.6 %      Neutrophils, Absolute 3.34 10*3/mm3      Lymphocytes, Absolute 1.22 10*3/mm3      Monocytes, Absolute 0.88 10*3/mm3      Eosinophils, Absolute 0.11 10*3/mm3      Basophils, Absolute 0.04 10*3/mm3      Immature Grans, Absolute 0.09 10*3/mm3      nRBC 0.0 /100 WBC     CK [[id number]]  (Normal) Collected: 10/08/24 1500    Specimen: Blood from Arm, Right Updated: 10/08/24 1641     Creatine Kinase 80 U/L     COVID-19, FLU A/B, RSV PCR 1 HR TAT - Swab, Nasopharynx [[id number]]  (Normal) Collected:  10/08/24 1604    Specimen: Swab from Nasopharynx Updated: 10/08/24 1652     COVID19 Not Detected     Influenza A PCR Not Detected     Influenza B PCR Not Detected     RSV, PCR Not Detected    Narrative:      Fact sheet for providers: https://www.fda.gov/media/257110/download    Fact sheet for patients: https://www.fda.gov/media/447328/download    Test performed by PCR.             Imaging:    CT Chest Without Contrast Diagnostic    Result Date: 10/8/2024  CT CHEST WO CONTRAST DIAGNOSTIC Date of Exam: 10/8/2024 4:19 PM EDT Indication: shortness of breath. Comparison: CT chest September 28, 2024 Technique: Axial CT images were obtained of the chest without contrast administration.  Reconstructed coronal and sagittal images were also obtained. Automated exposure control and iterative construction methods were used. Findings: There are some interstitial changes more peripherally within the lungs and basilar areas that could be reflective of more chronic interstitial change. There is no zuly consolidation. There are no large pleural effusions. There is vascular calcification including coronary artery calcification. There are small mediastinal lymph nodes which have been suggested. Lower slices through the upper abdomen reveal what looks like some cortical thinning to the kidneys. There are underlying bilateral renal lesions suggestive of cysts. There is bilateral gynecomastia. There are degenerative change about the thoracic spine. There is a compression deformity of T12 with what looks like a partial fusion of the disc space at T11-T12. Dialysis catheter is present with its tip at the caval atrial junction.     Impression: 1.There are some groundglass interstitial changes more peripherally in the lungs which has been suggested and basilar regions that could be reflective of more chronic interstitial change. 2.Atherosclerotic vascular calcification including coronary artery calcification. 3.Bilateral cortical renal cyst  thinning with bilateral renal lesions suggestive of cysts which could relate to the patient's renal function. 4.Bilateral gynecomastia 5.Wedge compression deformity T12 unchanged with partial fusion of the T11-T12 disc space Electronically Signed: Aureliano Jeffries MD  10/8/2024 4:43 PM EDT  Workstation ID: [id number]    CT Head Without Contrast    Result Date: 10/8/2024  CT HEAD WO CONTRAST Date of Exam: 10/8/2024 4:19 PM EDT Indication: Headache headache. Comparison: Head CT 6/10/2024 Technique: Axial CT images were obtained of the head without contrast administration.  Reconstructed coronal and sagittal images were also obtained. Automated exposure control and iterative construction methods were used. Findings: Study is degraded by motion artifact. Within technical limitation: No acute intracranial hemorrhage.Intact gray-white differentiation.No extra-axial fluid collection.No significant mass effect. No hydrocephalus. There is mild generalized parenchymal involutional change, likely age-appropriate. Scattered areas of periventricular and subcortical white matter hypoattenuation, nonspecific, not uncommon for age and perhaps from small vessel ischemic/hypertensive changes.There are intracranial atherosclerotic calcifications. Mild scattered mucosal thickening in the paranasal sinuses.Mastoid air cells are essentially clear.. Bilateral lens replacements. Globes and orbits otherwise appear unremarkable by CT. No acute or aggressive appearing bony or extracranial soft tissue process.     Impression: No acute intracranial finding. Electronically Signed: Taz Negron  10/8/2024 4:38 PM EDT  Workstation ID: [id number]    XR Chest 1 View    Result Date: 10/8/2024  XR CHEST 1 VW Date of Exam: 10/8/2024 3:22 PM EDT Indication: SOA Triage Protocol Comparison: Chest x-ray dated 9/30/2024 Findings: Right IJ dialysis catheter overlies the superior cavoatrial junction. Lung volumes are diminished. There is elevation of the right  hemidiaphragm. Cardiac silhouette is magnified but appears enlarged. Pulmonary vasculature is mildly indistinct. No consolidation or mass is seen. No pleural effusion or pneumothorax is seen. No acute osseous lesion is seen.     Impression: 1.Cardiomegaly with mild pulmonary vascular congestion. 2.Diminished lung volumes with elevated right hemidiaphragm. Electronically Signed: Shaq Scherer MD  10/8/2024 3:29 PM EDT  Workstation ID: [id number]       Differential Diagnosis and Discussion:      Dyspnea: Differential diagnosis includes but is not limited to metabolic acidosis, neurological disorders, psychogenic, asthma, pneumothorax, upper airway obstruction, COPD, pneumonia, noncardiogenic pulmonary edema, interstitial lung disease, anemia, congestive heart failure, and pulmonary embolism    All labs were reviewed and interpreted by me.  All X-rays impressions were independently interpreted by me.  EKG was interpreted by me.    MDM     Amount and/or Complexity of Data Reviewed  Decide to obtain previous medical records or to obtain history from someone other than the patient: yes       The patient´s CBC that was reviewed and interpreted by me shows no abnormalities of critical concern. Of note, there is no anemia requiring a blood transfusion and the platelet count is acceptable.  CMP shows a BUN of 33 and a creatinine of 4.5.  CT chest shows no infiltrates.  Patient does require supplemental oxygen.  Daughter is at the bedside and states that he is at baseline and feels comfortable taking him home with oxygen.              Patient Care Considerations:    PERC: I used the PERC score to risk stratify the patient for PE and a CT of the chest was considered but ultimately not indicated in today's visit.      Consultants/Shared Management Plan:    None    Social Determinants of Health:    Patient is independent, reliable, and has access to care.       Disposition and Care Coordination:    Discharged: I considered  escalation of care by admitting this patient to the hospital, however the patient is stable and suitable for discharge.    I have explained the patient´s condition, diagnoses and treatment plan based on the information available to me at this time. I have answered questions and addressed any concerns. The patient has a good  understanding of the patient´s diagnosis, condition, and treatment plan as can be expected at this point. The vital signs have been stable. The patient´s condition is stable and appropriate for discharge from the emergency department.      The patient will pursue further outpatient evaluation with the primary care physician or other designated or consulting physician as outlined in the discharge instructions. They are agreeable to this plan of care and follow-up instructions have been explained in detail. The patient has received these instructions in written format and has expressed an understanding of the discharge instructions. The patient is aware that any significant change in condition or worsening of symptoms should prompt an immediate return to this or the closest emergency department or call to 911.  I have explained discharge medications and the need for follow up with the patient/caretakers. This was also printed in the discharge instructions. Patient was discharged with the following medications and follow up:      Medication List        Changed      midodrine 2.5 MG tablet  Commonly known as: PROAMATINE  Take 1 tablet by mouth 3 (Three) Times a Day Before Meals. On dialysis days  What changed: additional instructions           Domingo Bravo MD  [address] [place] [id number]  [phone]    In 2 days         Final diagnoses:   Shortness of breath   Dyspnea, unspecified type        ED Disposition       ED Disposition   Discharge    Condition   Stable    Comment   --               This medical record created using voice recognition software.              Erica Bain MD  10/08/24 1801

## 2024-10-09 ENCOUNTER — OFFICE VISIT (OUTPATIENT)
Dept: INTERNAL MEDICINE | Facility: CLINIC | Age: 79
End: 2024-10-09
Payer: MEDICARE

## 2024-10-09 ENCOUNTER — DOCUMENTATION (OUTPATIENT)
Dept: INTERNAL MEDICINE | Facility: CLINIC | Age: 79
End: 2024-10-09

## 2024-10-09 VITALS
OXYGEN SATURATION: 87 % | BODY MASS INDEX: 26.18 KG/M2 | WEIGHT: 166.8 LBS | TEMPERATURE: 98.4 F | HEART RATE: 71 BPM | SYSTOLIC BLOOD PRESSURE: 109 MMHG | HEIGHT: 67 IN | DIASTOLIC BLOOD PRESSURE: 58 MMHG

## 2024-10-09 DIAGNOSIS — E03.9 ACQUIRED HYPOTHYROIDISM: ICD-10-CM

## 2024-10-09 DIAGNOSIS — S22.080S COMPRESSION FRACTURE OF T12 VERTEBRA, SEQUELA: ICD-10-CM

## 2024-10-09 DIAGNOSIS — J96.11 CHRONIC RESPIRATORY FAILURE WITH HYPOXIA: ICD-10-CM

## 2024-10-09 DIAGNOSIS — I10 ESSENTIAL HYPERTENSION: ICD-10-CM

## 2024-10-09 DIAGNOSIS — R10.13 EPIGASTRIC PAIN: ICD-10-CM

## 2024-10-09 DIAGNOSIS — Z99.2 ESRD (END STAGE RENAL DISEASE) ON DIALYSIS: Primary | ICD-10-CM

## 2024-10-09 DIAGNOSIS — Z79.4 TYPE 2 DIABETES MELLITUS WITHOUT COMPLICATION, WITH LONG-TERM CURRENT USE OF INSULIN: ICD-10-CM

## 2024-10-09 DIAGNOSIS — E11.9 TYPE 2 DIABETES MELLITUS WITHOUT COMPLICATION, WITH LONG-TERM CURRENT USE OF INSULIN: ICD-10-CM

## 2024-10-09 DIAGNOSIS — N18.6 ESRD (END STAGE RENAL DISEASE) ON DIALYSIS: Primary | ICD-10-CM

## 2024-10-09 DIAGNOSIS — I48.91 NEW ONSET ATRIAL FIBRILLATION: ICD-10-CM

## 2024-10-09 DIAGNOSIS — M54.9 INTRACTABLE BACK PAIN: ICD-10-CM

## 2024-10-09 LAB
25(OH)D3 SERPL-MCNC: 42 NG/ML (ref 30–100)
T4 FREE SERPL-MCNC: 1.3 NG/DL (ref 0.92–1.68)
TSH SERPL DL<=0.05 MIU/L-ACNC: 1.67 UIU/ML (ref 0.27–4.2)

## 2024-10-09 PROCEDURE — 3074F SYST BP LT 130 MM HG: CPT | Performed by: STUDENT IN AN ORGANIZED HEALTH CARE EDUCATION/TRAINING PROGRAM

## 2024-10-09 PROCEDURE — 1111F DSCHRG MED/CURRENT MED MERGE: CPT | Performed by: STUDENT IN AN ORGANIZED HEALTH CARE EDUCATION/TRAINING PROGRAM

## 2024-10-09 PROCEDURE — 99495 TRANSJ CARE MGMT MOD F2F 14D: CPT | Performed by: STUDENT IN AN ORGANIZED HEALTH CARE EDUCATION/TRAINING PROGRAM

## 2024-10-09 PROCEDURE — 3078F DIAST BP <80 MM HG: CPT | Performed by: STUDENT IN AN ORGANIZED HEALTH CARE EDUCATION/TRAINING PROGRAM

## 2024-10-09 PROCEDURE — 1126F AMNT PAIN NOTED NONE PRSNT: CPT | Performed by: STUDENT IN AN ORGANIZED HEALTH CARE EDUCATION/TRAINING PROGRAM

## 2024-10-10 LAB — HBA1C MFR BLD: 6.9 % (ref 4.8–5.6)

## 2024-10-11 LAB
MYCOBACTERIUM SPEC CULT: NORMAL
MYCOBACTERIUM SPEC CULT: NORMAL
NIGHT BLUE STAIN TISS: NORMAL

## 2024-10-14 ENCOUNTER — DOCUMENTATION (OUTPATIENT)
Dept: PULMONOLOGY | Facility: CLINIC | Age: 79
End: 2024-10-14
Payer: MEDICARE

## 2024-10-14 ENCOUNTER — TELEPHONE (OUTPATIENT)
Dept: PULMONOLOGY | Facility: CLINIC | Age: 79
End: 2024-10-14

## 2024-10-14 RX ORDER — BUDESONIDE, GLYCOPYRROLATE, AND FORMOTEROL FUMARATE 160; 9; 4.8 UG/1; UG/1; UG/1
2 AEROSOL, METERED RESPIRATORY (INHALATION) 2 TIMES DAILY
Qty: 1 EACH | Refills: 11 | Status: SHIPPED | OUTPATIENT
Start: 2024-10-14 | End: 2024-10-14

## 2024-10-14 RX ORDER — BUDESONIDE, GLYCOPYRROLATE, AND FORMOTEROL FUMARATE 160; 9; 4.8 UG/1; UG/1; UG/1
2 AEROSOL, METERED RESPIRATORY (INHALATION) 2 TIMES DAILY
Qty: 1 EACH | Refills: 5 | Status: SHIPPED | OUTPATIENT
Start: 2024-10-14

## 2024-10-14 NOTE — TELEPHONE ENCOUNTER
Called and spoke with pharmacy and they state they did get the script and it has been picked up now

## 2024-10-14 NOTE — TELEPHONE ENCOUNTER
Dr Espinal sent the medication at 1230, called the pharmacy at 1245 and they stated they have no received it yet but If it was just sent at 1230 it could take up to an hour for the pharmacy to get the prescription

## 2024-10-14 NOTE — TELEPHONE ENCOUNTER
Hub staff attempted to follow warm transfer process and was unsuccessful     Caller: Jose Wang    Relationship to patient: Emergency Contact    Best call back number: [phone]       Patient is needing: DR HORTON HAD GIVEN SAMPLES OF BRIXBY AND WAS SENDING A SCRIPT TO THE PHARMACY HOWEVER THE PHARMACY STATES THAT THEY DO NOT HAVE IT.

## 2024-10-16 ENCOUNTER — PREP FOR SURGERY (OUTPATIENT)
Dept: OTHER | Facility: HOSPITAL | Age: 79
End: 2024-10-16
Payer: MEDICARE

## 2024-10-16 DIAGNOSIS — N18.6 ESRD (END STAGE RENAL DISEASE) ON DIALYSIS: Primary | ICD-10-CM

## 2024-10-16 DIAGNOSIS — Z99.2 ESRD (END STAGE RENAL DISEASE) ON DIALYSIS: Primary | ICD-10-CM

## 2024-10-17 ENCOUNTER — TELEPHONE (OUTPATIENT)
Dept: GASTROENTEROLOGY | Facility: CLINIC | Age: 79
End: 2024-10-17
Payer: MEDICARE

## 2024-10-17 PROBLEM — N18.6 ESRD (END STAGE RENAL DISEASE) ON DIALYSIS: Status: ACTIVE | Noted: 2024-10-16

## 2024-10-17 PROBLEM — Z99.2 ESRD (END STAGE RENAL DISEASE) ON DIALYSIS: Status: ACTIVE | Noted: 2024-10-16

## 2024-10-17 NOTE — TELEPHONE ENCOUNTER
Cardiac Clearance Request  10/17/2024      Patient Name: Nelson Wang  : 1945      Dear Dr. Nichols,    This patient is waiting to have a Colonoscopy and/or Esophagogastroduodenoscopy which I will perform  at [facility] on 2024 Please respond to this request noting your recommendations regarding clearance from a cardiology standpoint.  You may contact our office at [phone] Option 2 with any questions. I appreciate your prompt response in this matter. Please return this form to our office as soon as possible to [phone].    ____ I approve my patient from a cardiology standpoint    ____ I do NOT approve my patient from a cardiology standpoint at this time      Approving physician name (please print): _____________________________________________      Approving physician signature: ________________________________ Date:________________      Sincerely,  [facility] [address]  Dr. Peraza's Office      Please fax approval or denial to our office as soon as possible.

## 2024-10-18 ENCOUNTER — TELEPHONE (OUTPATIENT)
Dept: GASTROENTEROLOGY | Facility: CLINIC | Age: 79
End: 2024-10-18
Payer: MEDICARE

## 2024-10-18 NOTE — LETTER
Blood Thinner Clearance Request                                                                                                                                                                           10/18/2024        Dear Dr. Bravo,     Patient Name: Nelson Wang  : 1945     This patient is waiting to have a Colonoscopy and/or Esophagogastroduodenoscopy which I will perform at [facility] on 2024. Our records indicate this patient is currently taking Eliquis. This procedure requires the patient to suspend their Anticoagulant medication prior to surgery. Please respond to this request noting your recommendations. You may contact our office at [phone] Option 2 with any questions. I appreciate your prompt response in this matter. Please return this form to our office as soon as possible to [phone].     ____ I approve my patient to stop taking their Anticoagulant Therapy medication 2 days prior to the scheduled 2024 procedure.     ____ I do NOT approve my patient to stop taking their Anticoagulant Therapy medication at this time.        Approving physician name (please print): _____________________________________________        Approving physician signature: ________________________________ Date:________________     Sincerely,     [facility]  Dr. Peraza's Office     *Please fax approval or denial to our office as soon as possible.

## 2024-10-18 NOTE — TELEPHONE ENCOUNTER
Procedure: Colonoscopy and/or EGD    Medication Directive: NA    PMH: HTN, HLD,     Last Seen: 08/14/24    ECHO: 09/28/24   Left ventricular ejection fraction appears to be 56 - 60%.    Left ventricular wall thickness is consistent with mild concentric hypertrophy.    Left ventricular diastolic function is consistent with (grade I) impaired relaxation.    There is calcification of the aortic valve.No significant stenosis.       [facility]: 05/29/24  Conclusions:  Appears mild, nonobstructive coronary artery disease.  LVEDP 23 mmHg.        Recommendations:   Aggressive risk factor reduction and goal-directed medical therapy

## 2024-10-18 NOTE — TELEPHONE ENCOUNTER
Blood Thinner Clearance Request                  10/18/2024      Dear Dr. Bravo,    Patient Name: Nelson Wang  : 1945    This patient is waiting to have a Colonoscopy and/or Esophagogastroduodenoscopy which I will perform at [facility] on 2024. Our records indicate this patient is currently taking Eliquis. This procedure requires the patient to suspend their Anticoagulant medication prior to surgery. Please respond to this request noting your recommendations. You may contact our office at [phone] Option 2 with any questions. I appreciate your prompt response in this matter. Please return this form to our office as soon as possible to [phone].    ____ I approve my patient to stop taking their Anticoagulant Therapy medication 2 days prior to the scheduled 2024 procedure.    ____ I do NOT approve my patient to stop taking their Anticoagulant Therapy medication at this time.      Approving physician name (please print): _____________________________________________      Approving physician signature: ________________________________ Date:________________    Sincerely,    [facility]  Dr. Peraza's Office    *Please fax approval or denial to our office as soon as possible.

## 2024-10-22 ENCOUNTER — TELEPHONE (OUTPATIENT)
Dept: INTERNAL MEDICINE | Facility: CLINIC | Age: 79
End: 2024-10-22
Payer: MEDICARE

## 2024-10-22 ENCOUNTER — NURSE TRIAGE (OUTPATIENT)
Dept: CALL CENTER | Facility: HOSPITAL | Age: 79
End: 2024-10-22
Payer: MEDICARE

## 2024-10-22 NOTE — TELEPHONE ENCOUNTER
Hub call. Unable to reach PCP office.      Patient daughter is caller Daughter, Dr Jose Wang, hospitalist at [facility] is daughter. She is asking to speak/discuss with Dr Bravo    Patient with recent hospital admission   Became confused at the hospital  Baseline is independent    Altered mentation continues throughout the day  She has taken him back to the ED for evaluation  Head CT was fine.    Dr Conner psych at [facility] did a consult today for the patient (Caller's mother had a scheduled appointment )   Dr Conner suggests Trazadone 50 mg. Dr Wang wishes to discuss recommendation with Dr Bravo.PCP    Warm transfer to PCP office. Spoke with Alfreda Gold is out of the office on Tuesday. Alfreda will place a note to PCP and will follow up to note in am.          Reason for Disposition   [1] Follow-up call from patient regarding patient's clinical status AND [2] information NON-URGENT    Additional Information   Negative: Lab calling with strep throat test results and triager can call in prescription   Negative: Lab calling with urinalysis test results and triager can call in prescription   Negative: Medication questions   Negative: Medication renewal and refill questions   Negative: Pre-operative or pre-procedural questions   Negative: ED call to PCP (i.e., primary care provider; doctor, NP, or PA)   Negative: Doctor (or NP/PA) call to PCP   Negative: Call about patient who is currently hospitalized   Negative: Lab or radiology calling with CRITICAL test results   Negative: [1] Follow-up call from patient regarding patient's clinical status AND [2] information urgent   Negative: [1] Caller requests to speak ONLY to PCP AND [2] URGENT question   Negative: [1] Caller requests to speak to PCP now AND [2] won't tell us reason for call  (Exception: If 10 pm to 6 am, caller must first discuss reason for the call.)   Negative: Notification of hospital admission   Negative: Notification of death   Negative:  "Caller requesting lab results  (Exception: Routine or non-urgent lab result.)   Negative: Lab or radiology calling with test results    Answer Assessment - Initial Assessment Questions  1. REASON FOR CALL or QUESTION: \"What is your reason for calling today?\" or \"How can I best  help you?\" or \"What question do you have that I can help answer?\"      See note  2. CALLER: Document the source of call. (e.g., laboratory, patient).      Dr Jose Wang    Protocols used: PCP Call - No Triage-ADULT-    "

## 2024-10-22 NOTE — TELEPHONE ENCOUNTER
Dr Jose Wang called concerning her father Dr Nelson Wang.  She states you had discussed the hallucinations he has been experiencing since his hospital stay.  Dr Wang was able to discuss some of her concerns with Dr Conner today during her mother's appt.  He had a few medication suggestions she needs to discuss with you.

## 2024-10-22 NOTE — TELEPHONE ENCOUNTER
Caller: Jose Wang    Relationship to patient: Emergency Contact    Best call back number: [phone]    Patient is needing: PATIENTS DAUGHTER CALLED REQUESTING TO SPEAK WITH A MEDICAL ASSISTANT. DAUGHTER STATES IT IS SLIGHTLY URGENT AND WOULD LIKE A CALL BACK AS SOON AS POSSIBLE. CALLER STATE IT IS REGARDING GETTING A STAT REFERRAL PLACED FOR HER FATHER TO BEHAVORIAL HEALTH. DID NOT GIVE ANY OTHER DETAILS.

## 2024-10-23 ENCOUNTER — TELEPHONE (OUTPATIENT)
Dept: INTERNAL MEDICINE | Facility: CLINIC | Age: 79
End: 2024-10-23
Payer: MEDICARE

## 2024-10-23 DIAGNOSIS — G47.00 INSOMNIA, UNSPECIFIED TYPE: ICD-10-CM

## 2024-10-23 DIAGNOSIS — R41.0 DELIRIUM: Primary | ICD-10-CM

## 2024-10-23 RX ORDER — TRAZODONE HYDROCHLORIDE 50 MG/1
50 TABLET, FILM COATED ORAL NIGHTLY
Qty: 90 TABLET | Refills: 2 | Status: ON HOLD | OUTPATIENT
Start: 2024-10-23

## 2024-10-23 NOTE — TELEPHONE ENCOUNTER
Caller: Jose Wang    Relationship: Reanna    Best call back number: [phone]     Who are you requesting to speak with (clinical staff, provider,  specific staff member): TYREE Abdullahi    What was the call regarding: Was speaking to angel luis and got disconnected

## 2024-10-23 NOTE — TELEPHONE ENCOUNTER
Spoke with daughter (Dr. Jose Wang) over the phone.  Had recently seen Dr. Conner for visit with mother.  Dr. Wang reports that Dr. Conner recommended trial of trazodone for help with sleep (hoping that improved sleep would reduce episodes of confusion).  She also asked for urgent referral to behavioral health so that her father could be examined by Dr. Conner.  I have placed this referral.

## 2024-10-27 ENCOUNTER — HOSPITAL ENCOUNTER (INPATIENT)
Facility: HOSPITAL | Age: 79
LOS: 5 days | Discharge: ANOTHER HOSPITAL | End: 2024-11-01
Attending: EMERGENCY MEDICINE | Admitting: STUDENT IN AN ORGANIZED HEALTH CARE EDUCATION/TRAINING PROGRAM
Payer: MEDICARE

## 2024-10-27 ENCOUNTER — APPOINTMENT (OUTPATIENT)
Dept: GENERAL RADIOLOGY | Facility: HOSPITAL | Age: 79
End: 2024-10-27
Payer: MEDICARE

## 2024-10-27 DIAGNOSIS — G93.40 ENCEPHALOPATHY ACUTE: Primary | ICD-10-CM

## 2024-10-27 DIAGNOSIS — R10.12 LUQ PAIN: ICD-10-CM

## 2024-10-27 DIAGNOSIS — R10.12 LEFT UPPER QUADRANT PAIN: ICD-10-CM

## 2024-10-27 DIAGNOSIS — R26.2 DIFFICULTY IN WALKING: ICD-10-CM

## 2024-10-27 DIAGNOSIS — Z78.9 DECREASED ACTIVITIES OF DAILY LIVING (ADL): ICD-10-CM

## 2024-10-27 PROBLEM — R41.82 AMS (ALTERED MENTAL STATUS): Status: ACTIVE | Noted: 2024-10-27

## 2024-10-27 PROBLEM — R44.3 HALLUCINATIONS: Status: ACTIVE | Noted: 2024-10-27

## 2024-10-27 LAB
ALBUMIN SERPL-MCNC: 3.5 G/DL (ref 3.5–5.2)
ALBUMIN/GLOB SERPL: 0.9 G/DL
ALP SERPL-CCNC: 78 U/L (ref 39–117)
ALT SERPL W P-5'-P-CCNC: 5 U/L (ref 1–41)
ANION GAP SERPL CALCULATED.3IONS-SCNC: 11.7 MMOL/L (ref 5–15)
AST SERPL-CCNC: 10 U/L (ref 1–40)
BACTERIA UR QL AUTO: ABNORMAL /HPF
BASOPHILS # BLD AUTO: 0.02 10*3/MM3 (ref 0–0.2)
BASOPHILS NFR BLD AUTO: 0.3 % (ref 0–1.5)
BILIRUB SERPL-MCNC: 1 MG/DL (ref 0–1.2)
BILIRUB UR QL STRIP: NEGATIVE
BUN SERPL-MCNC: 29 MG/DL (ref 8–23)
BUN/CREAT SERPL: 5.8 (ref 7–25)
CALCIUM SPEC-SCNC: 9.1 MG/DL (ref 8.6–10.5)
CHLORIDE SERPL-SCNC: 87 MMOL/L (ref 98–107)
CLARITY UR: ABNORMAL
CO2 SERPL-SCNC: 31.3 MMOL/L (ref 22–29)
COLOR UR: YELLOW
CREAT SERPL-MCNC: 4.96 MG/DL (ref 0.76–1.27)
DEPRECATED RDW RBC AUTO: 55.8 FL (ref 37–54)
EGFRCR SERPLBLD CKD-EPI 2021: 11.3 ML/MIN/1.73
EOSINOPHIL # BLD AUTO: 0.01 10*3/MM3 (ref 0–0.4)
EOSINOPHIL NFR BLD AUTO: 0.1 % (ref 0.3–6.2)
ERYTHROCYTE [DISTWIDTH] IN BLOOD BY AUTOMATED COUNT: 16.5 % (ref 12.3–15.4)
GEN 5 2HR TROPONIN T REFLEX: 79 NG/L
GLOBULIN UR ELPH-MCNC: 3.9 GM/DL
GLUCOSE SERPL-MCNC: 155 MG/DL (ref 65–99)
GLUCOSE UR STRIP-MCNC: ABNORMAL MG/DL
HCT VFR BLD AUTO: 26 % (ref 37.5–51)
HGB BLD-MCNC: 8 G/DL (ref 13–17.7)
HGB UR QL STRIP.AUTO: NEGATIVE
HOLD SPECIMEN: NORMAL
HOLD SPECIMEN: NORMAL
HYALINE CASTS UR QL AUTO: ABNORMAL /LPF
IMM GRANULOCYTES # BLD AUTO: 0.04 10*3/MM3 (ref 0–0.05)
IMM GRANULOCYTES NFR BLD AUTO: 0.5 % (ref 0–0.5)
KETONES UR QL STRIP: NEGATIVE
LEUKOCYTE ESTERASE UR QL STRIP.AUTO: ABNORMAL
LYMPHOCYTES # BLD AUTO: 1.52 10*3/MM3 (ref 0.7–3.1)
LYMPHOCYTES NFR BLD AUTO: 19.7 % (ref 19.6–45.3)
MAGNESIUM SERPL-MCNC: 1.8 MG/DL (ref 1.6–2.4)
MCH RBC QN AUTO: 29.1 PG (ref 26.6–33)
MCHC RBC AUTO-ENTMCNC: 30.8 G/DL (ref 31.5–35.7)
MCV RBC AUTO: 94.5 FL (ref 79–97)
MONOCYTES # BLD AUTO: 0.96 10*3/MM3 (ref 0.1–0.9)
MONOCYTES NFR BLD AUTO: 12.5 % (ref 5–12)
NEUTROPHILS NFR BLD AUTO: 5.15 10*3/MM3 (ref 1.7–7)
NEUTROPHILS NFR BLD AUTO: 66.9 % (ref 42.7–76)
NITRITE UR QL STRIP: NEGATIVE
NRBC BLD AUTO-RTO: 0 /100 WBC (ref 0–0.2)
PH UR STRIP.AUTO: 6 [PH] (ref 5–8)
PLATELET # BLD AUTO: 211 10*3/MM3 (ref 140–450)
PMV BLD AUTO: 9.3 FL (ref 6–12)
POTASSIUM SERPL-SCNC: 3.2 MMOL/L (ref 3.5–5.2)
PROT SERPL-MCNC: 7.4 G/DL (ref 6–8.5)
PROT UR QL STRIP: ABNORMAL
QT INTERVAL: 476 MS
QTC INTERVAL: 540 MS
RBC # BLD AUTO: 2.75 10*6/MM3 (ref 4.14–5.8)
RBC # UR STRIP: ABNORMAL /HPF
REF LAB TEST METHOD: ABNORMAL
SODIUM SERPL-SCNC: 130 MMOL/L (ref 136–145)
SP GR UR STRIP: 1.02 (ref 1–1.03)
SQUAMOUS #/AREA URNS HPF: ABNORMAL /HPF
TROPONIN T DELTA: -7 NG/L
TROPONIN T SERPL HS-MCNC: 86 NG/L
UROBILINOGEN UR QL STRIP: ABNORMAL
WBC # UR STRIP: ABNORMAL /HPF
WBC NRBC COR # BLD AUTO: 7.7 10*3/MM3 (ref 3.4–10.8)
WHOLE BLOOD HOLD COAG: NORMAL
WHOLE BLOOD HOLD SPECIMEN: NORMAL

## 2024-10-27 PROCEDURE — 83735 ASSAY OF MAGNESIUM: CPT | Performed by: EMERGENCY MEDICINE

## 2024-10-27 PROCEDURE — 81001 URINALYSIS AUTO W/SCOPE: CPT | Performed by: EMERGENCY MEDICINE

## 2024-10-27 PROCEDURE — 99223 1ST HOSP IP/OBS HIGH 75: CPT | Performed by: STUDENT IN AN ORGANIZED HEALTH CARE EDUCATION/TRAINING PROGRAM

## 2024-10-27 PROCEDURE — 99221 1ST HOSP IP/OBS SF/LOW 40: CPT | Performed by: INTERNAL MEDICINE

## 2024-10-27 PROCEDURE — 36415 COLL VENOUS BLD VENIPUNCTURE: CPT | Performed by: EMERGENCY MEDICINE

## 2024-10-27 PROCEDURE — 71045 X-RAY EXAM CHEST 1 VIEW: CPT

## 2024-10-27 PROCEDURE — 99285 EMERGENCY DEPT VISIT HI MDM: CPT

## 2024-10-27 PROCEDURE — 25010000002 ACETAMINOPHEN 10 MG/ML SOLUTION: Performed by: EMERGENCY MEDICINE

## 2024-10-27 PROCEDURE — 93005 ELECTROCARDIOGRAM TRACING: CPT | Performed by: EMERGENCY MEDICINE

## 2024-10-27 PROCEDURE — 80053 COMPREHEN METABOLIC PANEL: CPT | Performed by: EMERGENCY MEDICINE

## 2024-10-27 PROCEDURE — 84484 ASSAY OF TROPONIN QUANT: CPT | Performed by: EMERGENCY MEDICINE

## 2024-10-27 PROCEDURE — 85025 COMPLETE CBC W/AUTO DIFF WBC: CPT | Performed by: EMERGENCY MEDICINE

## 2024-10-27 RX ORDER — AMOXICILLIN 250 MG
2 CAPSULE ORAL 2 TIMES DAILY PRN
Status: DISCONTINUED | OUTPATIENT
Start: 2024-10-27 | End: 2024-11-01 | Stop reason: HOSPADM

## 2024-10-27 RX ORDER — CARVEDILOL 25 MG/1
25 TABLET ORAL DAILY PRN
Status: ON HOLD | COMMUNITY

## 2024-10-27 RX ORDER — SODIUM CHLORIDE 9 MG/ML
40 INJECTION, SOLUTION INTRAVENOUS AS NEEDED
Status: ACTIVE | OUTPATIENT
Start: 2024-10-27 | End: 2024-10-27

## 2024-10-27 RX ORDER — ACETAMINOPHEN 10 MG/ML
1000 INJECTION, SOLUTION INTRAVENOUS ONCE
Status: COMPLETED | OUTPATIENT
Start: 2024-10-27 | End: 2024-10-27

## 2024-10-27 RX ORDER — SODIUM CHLORIDE 0.9 % (FLUSH) 0.9 %
10 SYRINGE (ML) INJECTION AS NEEDED
Status: DISCONTINUED | OUTPATIENT
Start: 2024-10-27 | End: 2024-11-01 | Stop reason: HOSPADM

## 2024-10-27 RX ORDER — LIDOCAINE 4 G/G
1 PATCH TOPICAL ONCE
Status: COMPLETED | OUTPATIENT
Start: 2024-10-27 | End: 2024-10-27

## 2024-10-27 RX ORDER — BISACODYL 5 MG/1
5 TABLET, DELAYED RELEASE ORAL DAILY PRN
Status: DISCONTINUED | OUTPATIENT
Start: 2024-10-27 | End: 2024-11-01 | Stop reason: HOSPADM

## 2024-10-27 RX ORDER — ONDANSETRON 2 MG/ML
4 INJECTION INTRAMUSCULAR; INTRAVENOUS EVERY 6 HOURS PRN
Status: DISCONTINUED | OUTPATIENT
Start: 2024-10-27 | End: 2024-11-01 | Stop reason: HOSPADM

## 2024-10-27 RX ORDER — PREGABALIN 25 MG/1
25 CAPSULE ORAL DAILY
Status: ON HOLD | COMMUNITY
End: 2024-10-27

## 2024-10-27 RX ORDER — POLYETHYLENE GLYCOL 3350 17 G/17G
17 POWDER, FOR SOLUTION ORAL DAILY PRN
Status: DISCONTINUED | OUTPATIENT
Start: 2024-10-27 | End: 2024-11-01 | Stop reason: HOSPADM

## 2024-10-27 RX ORDER — BISACODYL 10 MG
10 SUPPOSITORY, RECTAL RECTAL DAILY PRN
Status: DISCONTINUED | OUTPATIENT
Start: 2024-10-27 | End: 2024-11-01 | Stop reason: HOSPADM

## 2024-10-27 RX ORDER — ONDANSETRON 4 MG/1
4 TABLET, ORALLY DISINTEGRATING ORAL EVERY 6 HOURS PRN
Status: DISCONTINUED | OUTPATIENT
Start: 2024-10-27 | End: 2024-11-01 | Stop reason: HOSPADM

## 2024-10-27 RX ORDER — ACETAMINOPHEN 325 MG/1
650 TABLET ORAL EVERY 4 HOURS PRN
Status: DISCONTINUED | OUTPATIENT
Start: 2024-10-27 | End: 2024-11-01 | Stop reason: HOSPADM

## 2024-10-27 RX ORDER — ACETAMINOPHEN 650 MG/1
650 SUPPOSITORY RECTAL EVERY 4 HOURS PRN
Status: DISCONTINUED | OUTPATIENT
Start: 2024-10-27 | End: 2024-11-01 | Stop reason: HOSPADM

## 2024-10-27 RX ORDER — FAMOTIDINE 10 MG/ML
20 INJECTION, SOLUTION INTRAVENOUS ONCE
Status: COMPLETED | OUTPATIENT
Start: 2024-10-27 | End: 2024-10-27

## 2024-10-27 RX ORDER — CLONIDINE HYDROCHLORIDE 0.1 MG/1
0.1 TABLET ORAL 4 TIMES DAILY PRN
Status: DISCONTINUED | OUTPATIENT
Start: 2024-10-27 | End: 2024-11-01 | Stop reason: HOSPADM

## 2024-10-27 RX ORDER — PANTOPRAZOLE SODIUM 40 MG/10ML
40 INJECTION, POWDER, LYOPHILIZED, FOR SOLUTION INTRAVENOUS ONCE
Status: COMPLETED | OUTPATIENT
Start: 2024-10-27 | End: 2024-10-27

## 2024-10-27 RX ORDER — ACETAMINOPHEN 160 MG/5ML
650 SOLUTION ORAL EVERY 4 HOURS PRN
Status: DISCONTINUED | OUTPATIENT
Start: 2024-10-27 | End: 2024-11-01 | Stop reason: HOSPADM

## 2024-10-27 RX ORDER — HYDROMORPHONE HYDROCHLORIDE 2 MG/1
1 TABLET ORAL EVERY 6 HOURS PRN
Status: DISCONTINUED | OUTPATIENT
Start: 2024-10-27 | End: 2024-11-01 | Stop reason: HOSPADM

## 2024-10-27 RX ORDER — SODIUM CHLORIDE 0.9 % (FLUSH) 0.9 %
10 SYRINGE (ML) INJECTION EVERY 12 HOURS SCHEDULED
Status: DISCONTINUED | OUTPATIENT
Start: 2024-10-27 | End: 2024-11-01 | Stop reason: HOSPADM

## 2024-10-27 RX ORDER — POTASSIUM CHLORIDE 750 MG/1
20 CAPSULE, EXTENDED RELEASE ORAL ONCE
Status: COMPLETED | OUTPATIENT
Start: 2024-10-27 | End: 2024-10-27

## 2024-10-27 RX ADMIN — FAMOTIDINE 20 MG: 10 INJECTION INTRAVENOUS at 06:14

## 2024-10-27 RX ADMIN — Medication 10 ML: at 08:46

## 2024-10-27 RX ADMIN — PANTOPRAZOLE SODIUM 40 MG: 40 INJECTION, POWDER, FOR SOLUTION INTRAVENOUS at 06:14

## 2024-10-27 RX ADMIN — LIDOCAINE 1 PATCH: 4 PATCH TOPICAL at 06:14

## 2024-10-27 RX ADMIN — ACETAMINOPHEN 1000 MG: 10 INJECTION INTRAVENOUS at 05:11

## 2024-10-27 RX ADMIN — POTASSIUM CHLORIDE 20 MEQ: 750 CAPSULE, EXTENDED RELEASE ORAL at 08:46

## 2024-10-27 RX ADMIN — ACETAMINOPHEN 650 MG: 325 TABLET ORAL at 21:32

## 2024-10-27 RX ADMIN — ACETAMINOPHEN 650 MG: 325 TABLET ORAL at 14:07

## 2024-10-27 RX ADMIN — CLONIDINE HYDROCHLORIDE 0.1 MG: 0.1 TABLET ORAL at 20:56

## 2024-10-27 NOTE — H&P (VIEW-ONLY)
[facility]  Initial Inpatient Consult Note    Referring Provider: Hospitalist    Reason for Consultation: abdominal pain    Subjective     History of present illness:    79 y.o. male with history of ESRD on HD, HTN, hypothyroidism, afib, DM2, chronic respiratory failure with hypoxia, previous C. difficile infection who presented with complaint of abdominal pain.  Pt reports LUQ abd pain over recent weeks.  He reports it is at the site of his previous PD catheter.  He reports pain is worse with eating and movement.  He denies pain is worse with dialysis.  He reports nausea associated with abdominal pain.  No melena, hematochezia.  Labs on presentation with WBC 7.7, hemoglobin 8.0, platelets 211, sodium 130, AST 10, ALT 5, alk phos 78, total bili 1.0.  Patient with history of bowel ischemia noted on 4/8/2024 CT at outside hospital.  EGD/colonoscopy with Dr. Peraza 5/28/2020 with grade B esophagitis, 2 mm dark pigmented area in the mid esophagus, normal stomach, normal duodenum; mild diverticulosis, three 4 to 6 mm polyps in the distal ascending colon and hepatic flexure.  Patient has pending EGD/colonoscopy with Dr. Peraza as outpatient.    Past Medical History:  Past Medical History:   Diagnosis Date    Abnormal stress test     Anemia     IRON INFUSIONS WITH DIALYSIS    Anesthesia     WITH HIP REPLACEMENT DAUGHTER BELIEVES HAD SVT 2000    Ankle pain, right     Anxiety and depression     CKD (chronic kidney disease), stage IV     DIALYSIS [id number] SHILEY IN RIGHT CHEST    Diabetes     Gout     High cholesterol     History of peritoneal dialysis     HL (hearing loss)     Hyperkalemia     Hypertension     Hypothyroidism     Insomnia     Night terrors     Psoriasis     Psoriasis     Sleep walking     Thrombocytopenia     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    Vitiligo      Past Surgical History:  Past Surgical History:   Procedure Laterality Date    ANKLE SURGERY Right 11/1990     APPENDECTOMY N/A 1954    ARTERIOVENOUS FISTULA/SHUNT SURGERY Left 07/16/2024    Procedure: Creation of left arm arteriovenous fistula;  Surgeon: Moses Mir MD;  Location: [facility] MAIN OR;  Service: Vascular;  Laterality: Left;    BRONCHOSCOPY N/A 03/01/2024    Procedure: BRONCHOSCOPY WITH BAL AND WASHINGS;  Surgeon: Sandra Espinal MD;  Location: [facility] ENDOSCOPY;  Service: Pulmonary;  Laterality: N/A;  PNEUMONIA    BRONCHOSCOPY N/A 08/30/2024    Procedure: BRONCHOSCOPY WITH BALS AND WASHINGS;  Surgeon: Sandra Espinal MD;  Location: [facility] ENDOSCOPY;  Service: Pulmonary;  Laterality: N/A;  MUCOUS PLUGGING    CARDIAC CATHETERIZATION N/A 05/29/2024    Procedure: Left Heart Cath with possible coronary angioplasty;  Surgeon: Stephan Nichols MD;  Location: [facility] CATH INVASIVE LOCATION;  Service: Cardiology;  Laterality: N/A;    COLONOSCOPY N/A 06/2022    [facility]    HIP BIPOLAR REPLACEMENT Right 01/2000    INSERTION HEMODIALYSIS CATHETER Left 04/09/2024    Procedure: HEMODIALYSIS CATHETER INSERTION;  Surgeon: Jose Berry MD;  Location: [facility] MAIN OR;  Service: General;  Laterality: Left;    INSERTION HEMODIALYSIS CATHETER N/A 04/12/2024    Procedure: Tunneled hemodialysis catheter insertion;  Surgeon: Enrique Vinson MD;  Location: [facility] MAIN OR;  Service: Vascular;  Laterality: N/A;    INSERTION PERITONEAL DIALYSIS CATHETER N/A 03/27/2023    Procedure: LAPAROSCOPIC INSERTION PERITONEAL DIALYSIS CATHETER, LAPAROSCOPIC OMENTOPEXY WITH LYSIS OF ADHESIONS;  Surgeon: Jose Berry MD;  Location: [facility] MAIN OR;  Service: General;  Laterality: N/A;    INSERTION PERITONEAL DIALYSIS CATHETER Left 07/23/2023    Procedure: REVISION OF PERITONEAL DIALYSIS CATHETER;  Surgeon: Radha Oreilly MD;  Location: [facility] MAIN OR;  Service: General;  Laterality: Left;    REMOVAL PERITONEAL DIALYSIS CATHETER N/A 04/09/2024    Procedure: REMOVAL PERITONEAL DIALYSIS CATHETER;  Surgeon: Jamal  Jose Cross MD;  [facility];  Service: General;  Laterality: N/A;    RENAL BIOPSY Left 07/15/2022    UPPER GASTROINTESTINAL ENDOSCOPY      WRIST SURGERY      UNSURE WHICH SIDE DAUGHTER REPORTS HAD SEVERE  CUT FROM WINDOW AND THEY HAD TO DO RECONSTRUCTIVE SURGERY WITH VESSELS AND NERVES      Social History:   Social History     Tobacco Use    Smoking status: Former     Current packs/day: 0.00     Average packs/day: 1 pack/day for 10.0 years (10.0 ttl pk-yrs)     Types: Cigarettes     Start date:      Quit date: 2000     Years since quittin.8     Passive exposure: Past    Smokeless tobacco: Never    Tobacco comments:     quit 25 years ago, chews nicotine gum in past   Substance Use Topics    Alcohol use: Yes     Comment: 1 DRINK/DAY-rarely      Family History:  Family History   Problem Relation Age of Onset    Diabetes Mother     Heart disease Father     Colon cancer Sister 77    Cancer Sister 35    Diabetes Sister     Diabetes Brother     Sleep apnea Paternal Aunt     Heart disease Paternal Uncle     Sleep apnea Daughter     Malig Hyperthermia Neg Hx        Home Meds:  (Not in a hospital admission)    Current Meds:   Lidocaine, 1 patch, Transdermal, Once      Allergies:  No Known Allergies  Review of Systems  Pertinent items are noted in HPI, all other systems reviewed and negative     Objective     Vital Signs  Temp:  [98.6 °F (37 °C)] 98.6 °F (37 °C)  Heart Rate:  [78-82] 82  Resp:  [20] 20  BP: (176-182)/(64-98) 182/64  Physical Exam:  General Appearance:    Alert, cooperative, in no acute distress   Head:    Normocephalic, without obvious abnormality, atraumatic   Eyes:          conjunctivae and sclerae normal, no icterus   Throat:   no thrush, oral mucosa moist   Neck:   Supple, no adenopathy   Lungs:     Breathing unlabored    Heart:    No chest tenderness   Chest Wall:    No abnormalities observed   Abdomen:     Soft, nondistended, nontender       Skin:   No bruising or rash    Psychiatric:  normal mood and insight     Results Review:   I reviewed the patient's new clinical results.    Results from last 7 days   Lab Units 10/27/24  0418   WBC 10*3/mm3 7.70   HEMOGLOBIN g/dL 8.0*   HEMATOCRIT % 26.0*   PLATELETS 10*3/mm3 211     Results from last 7 days   Lab Units 10/27/24  0418   SODIUM mmol/L 130*   POTASSIUM mmol/L 3.2*   CHLORIDE mmol/L 87*   CO2 mmol/L 31.3*   BUN mg/dL 29*   CREATININE mg/dL 4.96*   CALCIUM mg/dL 9.1   BILIRUBIN mg/dL 1.0   ALK PHOS U/L 78   ALT (SGPT) U/L 5   AST (SGOT) U/L 10   GLUCOSE mg/dL 155*         Lab Results   Lab Value Date/Time    LIPASE 34 09/28/2024 0126    LIPASE 64 (H) 03/24/2024 0534         Assessment & Plan     AMS (altered mental status)    Essential hypertension    Type 2 diabetes mellitus without complication    ESRD on dialysis    Abdominal pain    Hallucinations       Assessment:  LUQ abd pain  Nausea    Plan:  Pt has pending EGD with Dr. Peraza as outpatient  Discussed option of proceeding with EGD tomorrow for further evaluation of persistent LUQ pain  Benefits vs risks of procedure d/w patient; risks include but are not limited to bleeding, infection, perforation, and risk of sedation  Pt agreeable to proceeding with EGD; will proceed pending discussion with family      I discussed the patients findings and my recommendations with patient, nursing staff, and primary care team.    Bettye Xiao MD

## 2024-10-27 NOTE — CONSULTS
Referring Provider: Sukhdeep  Reason for Consultation: renal issues    Patient Care Team:  Domingo Bravo MD as PCP - General (Internal Medicine)  Josephine Warren APRN as Nurse Practitioner (Pulmonary Disease)  Virginie Lopez APRN as Nurse Practitioner (Pulmonary Disease)    Chief complaint     Subjective .     History of present illness:  Chart reviewed  Seems confused, difficult to get history from.  Well -Known to Dr Castro.    Admitted with abd pain and confusion/hallucinations.  HE was here last month for an abd pain work-up.  No SOA  No chills    Review of Systems-difficult    General : No pain, no fevers/chills  HEENT: No headache, no nosebleed, no sore throat, no blurry vision  Chest : no chest pain, no palpitations, no chest wall pain  Respiratory: No cough, no SOA, no WEBB, no hemoptysis, no orthopnea  GI: no BRBPR, no melena  Skin : no rashes, no pruritus  Musculoskeletal : no flank pain, no joint effusions  Neuro : No weakness, no numbness, no dizziness, no double vision  Endocrine: no heat/cold intolerance, no weight loss  Genitourinary: no dysuria or hematuria, no frequency  Psychiatric: no insomnia, no depression, no anxiety  Heme: no easy bruising or bleeding  Vascular: no cyanosis or extremity pain        Past Medical History:   Diagnosis Date    Abnormal stress test     Anemia     IRON INFUSIONS WITH DIALYSIS    Anesthesia     WITH HIP REPLACEMENT DAUGHTER BELIEVES HAD SVT 2000    Ankle pain, right     Anxiety and depression     CKD (chronic kidney disease), stage IV     DIALYSIS [id number] SHILEY IN RIGHT CHEST    Diabetes     Gout     High cholesterol     History of peritoneal dialysis     HL (hearing loss)     Hyperkalemia     Hypertension     Hypothyroidism     Insomnia     Night terrors     Psoriasis     Psoriasis     Sleep walking     Thrombocytopenia     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    Vitiligo      Past Surgical History:   Procedure Laterality Date    ANKLE SURGERY  Right 11/1990    APPENDECTOMY N/A 1954    ARTERIOVENOUS FISTULA/SHUNT SURGERY Left 07/16/2024    Procedure: Creation of left arm arteriovenous fistula;  Surgeon: Moses Mir MD;  Location: [facility] MAIN OR;  Service: Vascular;  Laterality: Left;    BRONCHOSCOPY N/A 03/01/2024    Procedure: BRONCHOSCOPY WITH BAL AND WASHINGS;  Surgeon: Sandra Espinal MD;  Location: [facility] ENDOSCOPY;  Service: Pulmonary;  Laterality: N/A;  PNEUMONIA    BRONCHOSCOPY N/A 08/30/2024    Procedure: BRONCHOSCOPY WITH BALS AND WASHINGS;  Surgeon: Sandra Espinal MD;  Location: [facility] ENDOSCOPY;  Service: Pulmonary;  Laterality: N/A;  MUCOUS PLUGGING    CARDIAC CATHETERIZATION N/A 05/29/2024    Procedure: Left Heart Cath with possible coronary angioplasty;  Surgeon: Stephan Nichols MD;  Location: [facility] CATH INVASIVE LOCATION;  Service: Cardiology;  Laterality: N/A;    COLONOSCOPY N/A 06/2022    [facility]    HIP BIPOLAR REPLACEMENT Right 01/2000    INSERTION HEMODIALYSIS CATHETER Left 04/09/2024    Procedure: HEMODIALYSIS CATHETER INSERTION;  Surgeon: Jose Berry MD;  Location: [facility] MAIN OR;  Service: General;  Laterality: Left;    INSERTION HEMODIALYSIS CATHETER N/A 04/12/2024    Procedure: Tunneled hemodialysis catheter insertion;  Surgeon: Enrique Vinson MD;  Location: [facility] MAIN OR;  Service: Vascular;  Laterality: N/A;    INSERTION PERITONEAL DIALYSIS CATHETER N/A 03/27/2023    Procedure: LAPAROSCOPIC INSERTION PERITONEAL DIALYSIS CATHETER, LAPAROSCOPIC OMENTOPEXY WITH LYSIS OF ADHESIONS;  Surgeon: Jose Berry MD;  Location: [facility] MAIN OR;  Service: General;  Laterality: N/A;    INSERTION PERITONEAL DIALYSIS CATHETER Left 07/23/2023    Procedure: REVISION OF PERITONEAL DIALYSIS CATHETER;  Surgeon: Radha Oreilly MD;  Location: [facility] MAIN OR;  Service: General;  Laterality: Left;    REMOVAL PERITONEAL DIALYSIS CATHETER N/A 04/09/2024    Procedure: REMOVAL PERITONEAL DIALYSIS CATHETER;   Surgeon: Jose Berry MD;  Location: [facility] MAIN OR;  Service: General;  Laterality: N/A;    RENAL BIOPSY Left 07/15/2022    UPPER GASTROINTESTINAL ENDOSCOPY      WRIST SURGERY      UNSURE WHICH SIDE DAUGHTER REPORTS HAD SEVERE  CUT FROM WINDOW AND THEY HAD TO DO RECONSTRUCTIVE SURGERY WITH VESSELS AND NERVES     Family History   Problem Relation Age of Onset    Diabetes Mother     Heart disease Father     Colon cancer Sister 77    Cancer Sister 35    Diabetes Sister     Diabetes Brother     Sleep apnea Paternal Aunt     Heart disease Paternal Uncle     Sleep apnea Daughter     Malig Hyperthermia Neg Hx      Social History     Tobacco Use    Smoking status: Former     Current packs/day: 0.00     Average packs/day: 1 pack/day for 10.0 years (10.0 ttl pk-yrs)     Types: Cigarettes     Start date:      Quit date:      Years since quittin.8     Passive exposure: Past    Smokeless tobacco: Never    Tobacco comments:     quit 25 years ago, chews nicotine gum in past   Vaping Use    Vaping status: Never Used   Substance Use Topics    Alcohol use: Yes     Comment: 1 DRINK/DAY-rarely    Drug use: Never     Medications Prior to Admission   Medication Sig Dispense Refill Last Dose/Taking    apixaban (ELIQUIS) 5 MG tablet tablet Take 1 tablet by mouth Every 12 (Twelve) Hours for 30 days. Indications: Atrial Fibrillation 60 tablet 0 Past Week    traZODone (DESYREL) 50 MG tablet Take 1 tablet by mouth Every Night. (Patient taking differently: Take 0.5-1 tablets by mouth Every Night.) 90 tablet 2 10/27/2024 at  2:00 AM    allopurinol (ZYLOPRIM) 300 MG tablet Take 1 tablet by mouth Daily As Needed.       amitriptyline (ELAVIL) 10 MG tablet Take 1 tablet by mouth Every Night. (Patient taking differently: Take 0.5 tablets by mouth Every Night.) 90 tablet 3     atorvastatin (LIPITOR) 40 MG tablet Take 1 tablet by mouth Daily. 90 tablet 1     Budeson-Glycopyrrol-Formoterol (Breztri Aerosphere) 160-9-4.8  MCG/ACT aerosol inhaler Inhale 2 puffs 2 (Two) Times a Day. 1 each 5     carvedilol (COREG) 25 MG tablet Take 1 tablet by mouth Every Morning.       carvedilol (COREG) 25 MG tablet Take 1 tablet by mouth Daily As Needed (in evening based on BP reading).       cloNIDine (CATAPRES) 0.2 MG tablet Take 1 tablet by mouth Every 8 (Eight) Hours As Needed for High Blood Pressure.       Insulin Glargine (LANTUS SOLOSTAR) 100 UNIT/ML injection pen Inject 10 Units under the skin into the appropriate area as directed Every Night.       Levothyroxine Sodium 175 MCG capsule Take 175 mcg by mouth Daily. 30 capsule 0     lidocaine-prilocaine (EMLA) 2.5-2.5 % cream Apply 1 Application topically to the appropriate area as directed As Needed for Injection Site Pain.       Methoxy PEG-Epoetin Beta (MIRCERA IJ) 1 (One) Time As Needed (Every 14 days PRN- would receive during dialysis).       midodrine (PROAMATINE) 2.5 MG tablet Take 1 tablet by mouth 3 (Three) Times a Day Before Meals. On dialysis days (Patient taking differently: Take 1 tablet by mouth Take As Directed. Four Times a week PRN based on BP post Dialysis-   Monday, Tuesday, Thursday, and Friday) 90 tablet 3     pantoprazole (PROTONIX) 40 MG EC tablet Take 1 tablet by mouth Daily. (Patient taking differently: Take 1 tablet by mouth 2 (Two) Times a Day.) 60 tablet 3     Risankizumab-rzaa (Skyrizi Pen) 150 MG/ML solution auto-injector Inject 1 dose under the skin into the appropriate area as directed Take As Directed. Every 3 months   10/16/2024    sertraline (ZOLOFT) 100 MG tablet Take 1 tablet by mouth Every Night. 90 tablet 2     sevelamer (RENVELA) 800 MG tablet Take 2 tablets by mouth 3 (Three) Times a Day With Meals for 30 days. (Patient taking differently: Take 2 tablets by mouth 2 (Two) Times a Day.) 180 tablet 0     torsemide (DEMADEX) 100 MG tablet Take 1 tablet by mouth Daily. 90 tablet 2      Lidocaine, 1 patch, Transdermal, Once  sodium chloride, 10 mL,  "Intravenous, Q12H         Allergies:   Patient has no known allergies.    Objective     Vital Signs   Temp:  [98.1 °F (36.7 °C)-98.6 °F (37 °C)] 98.1 °F (36.7 °C)  Heart Rate:  [70-82] 74  Resp:  [20-22] 22  BP: (140-182)/(64-98) 161/75    No intake or output data in the 24 hours ending 10/27/24 1350    Physical Exam:      General Appearance:    Alert,  in no acute distress, pleasant   Head:    Normocephalic, without obvious abnormality, atraumatic   Eyes:          No drainage, no icterus   Throat:   No oral lesions, no thrush, oral mucosa moist   Neck:   No adenopathy,  no thyromegaly,  no JVD   Musc:     No CVA tenderness to palpation, no joint effusions   Lungs:     Clear to auscultation bilaterally,no wheezes or rhonchi ,  unlabored    Heart:    Regular rhythm and normal rate, normal S1 and S2, no   murmur, no gallop, no rub   Chest Wall:    No chest wall tenderness to palpation   Abdomen:     Normal bowel sounds, no masses,  soft non-tender, non-distended, no guarding, no rebound    Rectal:     Deferred   Extremities:   No Lower extremity edema, no cyanosis   Pulses:   Radial Pulses palpable   Skin:   Dry, No rashes   Lymph nodes:   No palpable adenopathy in neck    Neurologic:   Cranial nerves 2 - 12 grossly intact, no gross motor deficits          Results Review:       Results from last 7 days   Lab Units 10/27/24  0418   SODIUM mmol/L 130*   POTASSIUM mmol/L 3.2*   CHLORIDE mmol/L 87*   CO2 mmol/L 31.3*   BUN mg/dL 29*   CREATININE mg/dL 4.96*   GLUCOSE mg/dL 155*   CALCIUM mg/dL 9.1     Results from last 7 days   Lab Units 10/27/24  0418   WBC 10*3/mm3 7.70   HEMOGLOBIN g/dL 8.0*   HEMATOCRIT % 26.0*   PLATELETS 10*3/mm3 211     Magnesium   Date Value Ref Range Status   10/27/2024 1.8 1.6 - 2.4 mg/dL Final     No results found for: \"PHOS\"  No results found for: \"BNP\"  Lab Results   Component Value Date    ALBUMIN 3.5 10/27/2024      Brief Urine Lab Results  (Last result in the past 365 days)        Color   " Clarity   Blood   Leuk Est   Nitrite   Protein   CREAT   Urine HCG        04/09/24 0412 Yellow   Clear   Small (1+)   Negative   Negative   100 mg/dL (2+)                    XR Chest 1 View    Result Date: 10/27/2024  XR CHEST 1 VW Date of Exam: 10/27/2024 4:03 AM EDT Indication: Weak/Dizzy/AMS triage protocol Comparison: 10/8/2024 Findings: Dialysis catheter tips are at the right atrial level. Cardiomegaly is stable. There is atherosclerotic disease in the aorta. There is chronic elevation of the right hemidiaphragm. Lung volumes remain quite low. There is some vascular congestion. No definite acute infiltrates. No pneumothorax.     Impression: Stable cardiac enlargement with mild vascular congestion. Electronically Signed: Cristian Tiwari MD  10/27/2024 4:12 AM EDT  Workstation ID: [id number]         Assessment & Plan       AMS (altered mental status)    Essential hypertension    Type 2 diabetes mellitus without complication    ESRD on dialysis    Abdominal pain    Hallucinations    1.  ESRD: Volume status is adequate. Nurse believes he is TTS patient and had HD yesterday which labs support. Will plan for extra treatment tomorrow to see if that improves his confusion       2.  Anemia: On Mircera protocol at HD unit     3.  Low K- common after dialysis (yesterday).  Should self correct , will use 4 k bath tomorrow. There is a mention of Lokelma use, will hold it     4.  Abdominal pain     5.  Hypertension: Blood pressure meds from home look like they are prn, can resume here as needed  per medicine. Will go ahead and add back prn clonidine. Previous   2D echo showed mild LVH with grade 1 diastolic dysfunction.     6.  History of hypothyroidism on Synthroid, per primary.     7.  Hyponatremia: needs 1800 cc p.o. fluid restriction per day.       8.  Hyperphosphatemia: Continue phosphate binder and low phosphorus diet.     9.  Confusion- ? Cause, will do extra HD tomorrow         Plan:  Dialysis tomorrow and  TTS      Thank you for consult.   Please call [phone] for any questions.

## 2024-10-27 NOTE — CONSULTS
[facility]  Initial Inpatient Consult Note    Referring Provider: Hospitalist    Reason for Consultation: abdominal pain    Subjective     History of present illness:    79 y.o. male with history of ESRD on HD, HTN, hypothyroidism, afib, DM2, chronic respiratory failure with hypoxia, previous C. difficile infection who presented with complaint of abdominal pain.  Pt reports LUQ abd pain over recent weeks.  He reports it is at the site of his previous PD catheter.  He reports pain is worse with eating and movement.  He denies pain is worse with dialysis.  He reports nausea associated with abdominal pain.  No melena, hematochezia.  Labs on presentation with WBC 7.7, hemoglobin 8.0, platelets 211, sodium 130, AST 10, ALT 5, alk phos 78, total bili 1.0.  Patient with history of bowel ischemia noted on 4/8/2024 CT at outside hospital.  EGD/colonoscopy with Dr. Peraza 5/28/2020 with grade B esophagitis, 2 mm dark pigmented area in the mid esophagus, normal stomach, normal duodenum; mild diverticulosis, three 4 to 6 mm polyps in the distal ascending colon and hepatic flexure.  Patient has pending EGD/colonoscopy with Dr. Peraza as outpatient.    Past Medical History:  Past Medical History:   Diagnosis Date    Abnormal stress test     Anemia     IRON INFUSIONS WITH DIALYSIS    Anesthesia     WITH HIP REPLACEMENT DAUGHTER BELIEVES HAD SVT 2000    Ankle pain, right     Anxiety and depression     CKD (chronic kidney disease), stage IV     DIALYSIS [id number] SHILEY IN RIGHT CHEST    Diabetes     Gout     High cholesterol     History of peritoneal dialysis     HL (hearing loss)     Hyperkalemia     Hypertension     Hypothyroidism     Insomnia     Night terrors     Psoriasis     Psoriasis     Sleep walking     Thrombocytopenia     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    Vitiligo      Past Surgical History:  Past Surgical History:   Procedure Laterality Date    ANKLE SURGERY Right 11/1990     APPENDECTOMY N/A 1954    ARTERIOVENOUS FISTULA/SHUNT SURGERY Left 07/16/2024    Procedure: Creation of left arm arteriovenous fistula;  Surgeon: Moses Mir MD;  Location: [facility] MAIN OR;  Service: Vascular;  Laterality: Left;    BRONCHOSCOPY N/A 03/01/2024    Procedure: BRONCHOSCOPY WITH BAL AND WASHINGS;  Surgeon: Sandra Espinal MD;  Location: [facility] ENDOSCOPY;  Service: Pulmonary;  Laterality: N/A;  PNEUMONIA    BRONCHOSCOPY N/A 08/30/2024    Procedure: BRONCHOSCOPY WITH BALS AND WASHINGS;  Surgeon: Sandra Espinal MD;  Location: [facility] ENDOSCOPY;  Service: Pulmonary;  Laterality: N/A;  MUCOUS PLUGGING    CARDIAC CATHETERIZATION N/A 05/29/2024    Procedure: Left Heart Cath with possible coronary angioplasty;  Surgeon: Stephan Nichols MD;  Location: [facility] CATH INVASIVE LOCATION;  Service: Cardiology;  Laterality: N/A;    COLONOSCOPY N/A 06/2022    [facility]    HIP BIPOLAR REPLACEMENT Right 01/2000    INSERTION HEMODIALYSIS CATHETER Left 04/09/2024    Procedure: HEMODIALYSIS CATHETER INSERTION;  Surgeon: Jose Berry MD;  Location: [facility] MAIN OR;  Service: General;  Laterality: Left;    INSERTION HEMODIALYSIS CATHETER N/A 04/12/2024    Procedure: Tunneled hemodialysis catheter insertion;  Surgeon: Enrique Vinson MD;  Location: [facility] MAIN OR;  Service: Vascular;  Laterality: N/A;    INSERTION PERITONEAL DIALYSIS CATHETER N/A 03/27/2023    Procedure: LAPAROSCOPIC INSERTION PERITONEAL DIALYSIS CATHETER, LAPAROSCOPIC OMENTOPEXY WITH LYSIS OF ADHESIONS;  Surgeon: Jose Berry MD;  Location: [facility] MAIN OR;  Service: General;  Laterality: N/A;    INSERTION PERITONEAL DIALYSIS CATHETER Left 07/23/2023    Procedure: REVISION OF PERITONEAL DIALYSIS CATHETER;  Surgeon: Radha Oreilly MD;  Location: [facility] MAIN OR;  Service: General;  Laterality: Left;    REMOVAL PERITONEAL DIALYSIS CATHETER N/A 04/09/2024    Procedure: REMOVAL PERITONEAL DIALYSIS CATHETER;  Surgeon: Jamal  Jose Cross MD;  Location: [facility] OR;  Service: General;  Laterality: N/A;    RENAL BIOPSY Left 07/15/2022    UPPER GASTROINTESTINAL ENDOSCOPY      WRIST SURGERY      UNSURE WHICH SIDE DAUGHTER REPORTS HAD SEVERE  CUT FROM WINDOW AND THEY HAD TO DO RECONSTRUCTIVE SURGERY WITH VESSELS AND NERVES      Social History:   Social History     Tobacco Use    Smoking status: Former     Current packs/day: 0.00     Average packs/day: 1 pack/day for 10.0 years (10.0 ttl pk-yrs)     Types: Cigarettes     Start date:      Quit date: 2000     Years since quittin.8     Passive exposure: Past    Smokeless tobacco: Never    Tobacco comments:     quit 25 years ago, chews nicotine gum in past   Substance Use Topics    Alcohol use: Yes     Comment: 1 DRINK/DAY-rarely      Family History:  Family History   Problem Relation Age of Onset    Diabetes Mother     Heart disease Father     Colon cancer Sister 77    Cancer Sister 35    Diabetes Sister     Diabetes Brother     Sleep apnea Paternal Aunt     Heart disease Paternal Uncle     Sleep apnea Daughter     Malig Hyperthermia Neg Hx        Home Meds:  (Not in a hospital admission)    Current Meds:   Lidocaine, 1 patch, Transdermal, Once      Allergies:  No Known Allergies  Review of Systems  Pertinent items are noted in HPI, all other systems reviewed and negative     Objective     Vital Signs  Temp:  [98.6 °F (37 °C)] 98.6 °F (37 °C)  Heart Rate:  [78-82] 82  Resp:  [20] 20  BP: (176-182)/(64-98) 182/64  Physical Exam:  General Appearance:    Alert, cooperative, in no acute distress   Head:    Normocephalic, without obvious abnormality, atraumatic   Eyes:          conjunctivae and sclerae normal, no icterus   Throat:   no thrush, oral mucosa moist   Neck:   Supple, no adenopathy   Lungs:     Breathing unlabored    Heart:    No chest tenderness   Chest Wall:    No abnormalities observed   Abdomen:     Soft, nondistended, nontender       Skin:   No bruising or rash    Psychiatric:  normal mood and insight     Results Review:   I reviewed the patient's new clinical results.    Results from last 7 days   Lab Units 10/27/24  0418   WBC 10*3/mm3 7.70   HEMOGLOBIN g/dL 8.0*   HEMATOCRIT % 26.0*   PLATELETS 10*3/mm3 211     Results from last 7 days   Lab Units 10/27/24  0418   SODIUM mmol/L 130*   POTASSIUM mmol/L 3.2*   CHLORIDE mmol/L 87*   CO2 mmol/L 31.3*   BUN mg/dL 29*   CREATININE mg/dL 4.96*   CALCIUM mg/dL 9.1   BILIRUBIN mg/dL 1.0   ALK PHOS U/L 78   ALT (SGPT) U/L 5   AST (SGOT) U/L 10   GLUCOSE mg/dL 155*         Lab Results   Lab Value Date/Time    LIPASE 34 09/28/2024 0126    LIPASE 64 (H) 03/24/2024 0534         Assessment & Plan     AMS (altered mental status)    Essential hypertension    Type 2 diabetes mellitus without complication    ESRD on dialysis    Abdominal pain    Hallucinations       Assessment:  LUQ abd pain  Nausea    Plan:  Pt has pending EGD with Dr. Peraza as outpatient  Discussed option of proceeding with EGD tomorrow for further evaluation of persistent LUQ pain  Benefits vs risks of procedure d/w patient; risks include but are not limited to bleeding, infection, perforation, and risk of sedation  Pt agreeable to proceeding with EGD; will proceed pending discussion with family      I discussed the patients findings and my recommendations with patient, nursing staff, and primary care team.    Bettye Xiao MD

## 2024-10-27 NOTE — PROGRESS NOTES
Reviewed hospitalization plan, all questions answered, patient seen at the bedside more alert and awake today, seems to be understanding that he still is hallucinating and has periods of altered mental status.  GI consulted with abdominal pain ongoing, previously seen by Dr. Peraza, plans to pursue EGD tomorrow, nephrology consulted with patient being ESRD patient.  Clinical course to dictate further management, discussed with nurse at the bedside    Electronically signed by Salú Vargas MD, 10/27/2024, 10:08 EDT.    Portions of this documentation were transcribed electronically from a voice recognition software.  I confirm all data accurately represents the service(s) I performed at today's visit.

## 2024-10-27 NOTE — H&P
Patient Care Team:  Domingo Bravo MD as PCP - General (Internal Medicine)  Josephine Warren APRN as Nurse Practitioner (Pulmonary Disease)  Virginie Lopez APRN as Nurse Practitioner (Pulmonary Disease)    Chief complaint altered mental status, hallucinations    Subjective     Patient is a 79 y.o. male presents with altered mental status, hallucinations, recurring abdominal pain.  History taken from daughter who is a internal medicine doctor at our facility patient was recently admitted for abdominal pain and back pain and had a full workup that did not reveal etiology.  Patient was recently discharged on 9/28 and has been having worsening mentation since then with occasional hallucinations.  He had a workup that did not reveal cause.He has been following outpatient with Dr. Peraza.  They have tried multiple pain medications including gabapentin and a small dose of Percocet however it causes the patient become more altered.  Patient did have a psych consult with Dr. Conner who suggested trazodone 50 mg.  This is also caused the patient to be more lethargic.      Review of Systems   Pertinent items are noted in HPI    History  Past Medical History:   Diagnosis Date    Abnormal stress test     Anemia     IRON INFUSIONS WITH DIALYSIS    Anesthesia     WITH HIP REPLACEMENT DAUGHTER BELIEVES HAD SVT 2000    Ankle pain, right     Anxiety and depression     CKD (chronic kidney disease), stage IV     DIALYSIS [id number] SHILEY IN RIGHT CHEST    Diabetes     Gout     High cholesterol     History of peritoneal dialysis     HL (hearing loss)     Hyperkalemia     Hypertension     Hypothyroidism     Insomnia     Night terrors     Psoriasis     Psoriasis     Sleep walking     Thrombocytopenia     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    Vitiligo      Past Surgical History:   Procedure Laterality Date    ANKLE SURGERY Right 11/1990    APPENDECTOMY N/A 1954    ARTERIOVENOUS FISTULA/SHUNT SURGERY Left 07/16/2024     Procedure: Creation of left arm arteriovenous fistula;  Surgeon: Moses Mir MD;  Location: [facility] MAIN OR;  Service: Vascular;  Laterality: Left;    BRONCHOSCOPY N/A 03/01/2024    Procedure: BRONCHOSCOPY WITH BAL AND WASHINGS;  Surgeon: Sandra Espinal MD;  Location: [facility] ENDOSCOPY;  Service: Pulmonary;  Laterality: N/A;  PNEUMONIA    BRONCHOSCOPY N/A 08/30/2024    Procedure: BRONCHOSCOPY WITH BALS AND WASHINGS;  Surgeon: Sandra Espinal MD;  Location: [facility] ENDOSCOPY;  Service: Pulmonary;  Laterality: N/A;  MUCOUS PLUGGING    CARDIAC CATHETERIZATION N/A 05/29/2024    Procedure: Left Heart Cath with possible coronary angioplasty;  Surgeon: Stephan Nichols MD;  Location: [facility] CATH INVASIVE LOCATION;  Service: Cardiology;  Laterality: N/A;    COLONOSCOPY N/A 06/2022    [facility]    HIP BIPOLAR REPLACEMENT Right 01/2000    INSERTION HEMODIALYSIS CATHETER Left 04/09/2024    Procedure: HEMODIALYSIS CATHETER INSERTION;  Surgeon: Jose Berry MD;  Location: [facility] MAIN OR;  Service: General;  Laterality: Left;    INSERTION HEMODIALYSIS CATHETER N/A 04/12/2024    Procedure: Tunneled hemodialysis catheter insertion;  Surgeon: Enrique Vinson MD;  Location: [facility] MAIN OR;  Service: Vascular;  Laterality: N/A;    INSERTION PERITONEAL DIALYSIS CATHETER N/A 03/27/2023    Procedure: LAPAROSCOPIC INSERTION PERITONEAL DIALYSIS CATHETER, LAPAROSCOPIC OMENTOPEXY WITH LYSIS OF ADHESIONS;  Surgeon: Jose Berry MD;  Location: [facility] MAIN OR;  Service: General;  Laterality: N/A;    INSERTION PERITONEAL DIALYSIS CATHETER Left 07/23/2023    Procedure: REVISION OF PERITONEAL DIALYSIS CATHETER;  Surgeon: Radha Oreilly MD;  Location: [facility] MAIN OR;  Service: General;  Laterality: Left;    REMOVAL PERITONEAL DIALYSIS CATHETER N/A 04/09/2024    Procedure: REMOVAL PERITONEAL DIALYSIS CATHETER;  Surgeon: Jose Berry MD;  Location: [facility] MAIN OR;  Service: General;  Laterality:  N/A;    RENAL BIOPSY Left 07/15/2022    UPPER GASTROINTESTINAL ENDOSCOPY      WRIST SURGERY      UNSURE WHICH SIDE DAUGHTER REPORTS HAD SEVERE  CUT FROM WINDOW AND THEY HAD TO DO RECONSTRUCTIVE SURGERY WITH VESSELS AND NERVES     Family History   Problem Relation Age of Onset    Diabetes Mother     Heart disease Father     Colon cancer Sister 77    Cancer Sister 35    Diabetes Sister     Diabetes Brother     Sleep apnea Paternal Aunt     Heart disease Paternal Uncle     Sleep apnea Daughter     Malig Hyperthermia Neg Hx      Social History     Tobacco Use    Smoking status: Former     Current packs/day: 0.00     Average packs/day: 1 pack/day for 10.0 years (10.0 ttl pk-yrs)     Types: Cigarettes     Start date:      Quit date:      Years since quittin.8     Passive exposure: Past    Smokeless tobacco: Never    Tobacco comments:     quit 25 years ago, chews nicotine gum in past   Vaping Use    Vaping status: Never Used   Substance Use Topics    Alcohol use: Yes     Comment: 1 DRINK/DAY-rarely    Drug use: Never     (Not in a hospital admission)   Allergies:  Patient has no known allergies.    Objective     Vital Signs  Temp:  [98.6 °F (37 °C)] 98.6 °F (37 °C)  Heart Rate:  [78] 78  Resp:  [20] 20  BP: (176)/(98) 176/98    Physical Exam:      General Appearance:  Alert, cooperative, in no acute distress   Head:  Normocephalic, without obvious abnormality, atraumatic   Eyes:  Lids and lashes normal, conjunctivae and sclerae normal, no icterus, no pallor, corneas clear, PERRLA   Ears:  Ears appear intact with no abnormalities noted   Throat:  No oral lesions, no thrush, oral mucosa moist   Neck:  No adenopathy, supple, trachea midline, no thyromegaly, no carotid bruit, no JVD   Back:  No kyphosis present, no scoliosis present, no skin lesions, erythema or scars, no tenderness to percussion or palpation, range of motion normal   Lungs:  Clear to auscultation, respirations regular, even and unlabored     Heart:  Regular rhythm and normal rate, normal S1 and S2, no murmur, no gallop, no rub, no click   Chest Wall:  No abnormalities observed   Abdomen:  Normal bowel sounds, no masses, no organomegaly, soft non-tender, non-distended, no guarding, no rebound tenderness   Rectal:  Deferred   Extremities:  Moves all extremities well, no edema, no cyanosis, no redness   Pulses:  Pulses palpable and equal bilaterally   Skin:  No bleeding, bruising or rash   Lymph nodes:  No palpable adenopathy   Neurologic:  Cranial nerves 2 - 12 grossly intact, sensation intact, DTR present and equal bilaterally         Results Review:    I reviewed the patient's new clinical results.  I reviewed the patient's new imaging results and agree with the interpretation.  I reviewed the patient's other test results and agree with the interpretation  I personally viewed and interpreted the patient's EKG/Telemetry data    Assessment & Plan       AMS (altered mental status)    Essential hypertension    Type 2 diabetes mellitus without complication    ESRD on dialysis    Abdominal pain    Hallucinations      Admit to hospitalist service  Telemetry   will try course of p.o. Dilaudid as this has more liver metabolism as opposed to kidney  Psych consult, notified of consultation  GI consult, notified  Keep n.p.o. for now  PT OT  Full Code      Shiraz Tarango MD  10/27/24  05:56 EDT

## 2024-10-27 NOTE — CONSULTS
[facility]   PSYCHIATRIC CONSULTATION    Patient Name: Nelson Wang  : 1945  MRN: [id number]  Primary Care Physician:  Domingo Bravo MD  Date of admission: 10/27/2024    Referring Provider: LORELEI Tarango MD  Reason for Consultation: Altered mental status    Subjective   Subjective     Chief Complaint: My back hurts    HPI:     Nelson Wang is a 79 y.o. male with multiple medical conditions that included end-stage kidney disease, anemia, hypertension was admitted for altered mental status and auditory hallucinations.   On my assessment this morning the patient was mostly preoccupied with pain in his back, wanted to go to a different kind of hospital that he could get help.  The patient was alert and oriented to person, place in regards of time he knew that it was October but believes the year was .  The patient denied feeling unsafe or paranoid; when asked specifically about hearing voices or seeing things that was not there the patient did not come up with answer.  The patient complained of having difficulties of sleeping at night but attributed this to pain in his back.  The record indicated that patient has an outpatient psychiatrist Dr. Conner who prescribed the medications that included trazodone and Zoloft.    Review of Systems   All systems were reviewed and negative except for: Back pain    Personal History     Past Medical History:   Diagnosis Date    Abnormal stress test     Anemia     IRON INFUSIONS WITH DIALYSIS    Anesthesia     WITH HIP REPLACEMENT DAUGHTER BELIEVES HAD SVT     Ankle pain, right     Anxiety and depression     CKD (chronic kidney disease), stage IV     DIALYSIS [id number]-WAONBKEY SHILEY IN RIGHT CHEST    Diabetes     Gout     High cholesterol     History of peritoneal dialysis     HL (hearing loss)     Hyperkalemia     Hypertension     Hypothyroidism     Insomnia     Night terrors     Psoriasis     Psoriasis     Sleep walking     Thrombocytopenia     DAUGHTER  REPORTS CHRONIC LOW PLATELET    Vitiligo        Past Surgical History:   Procedure Laterality Date    ANKLE SURGERY Right 11/1990    APPENDECTOMY N/A 1954    ARTERIOVENOUS FISTULA/SHUNT SURGERY Left 07/16/2024    Procedure: Creation of left arm arteriovenous fistula;  Surgeon: Moses Mir MD;  Location: [facility] MAIN OR;  Service: Vascular;  Laterality: Left;    BRONCHOSCOPY N/A 03/01/2024    Procedure: BRONCHOSCOPY WITH BAL AND WASHINGS;  Surgeon: Sandra Espinal MD;  Location: [facility] ENDOSCOPY;  Service: Pulmonary;  Laterality: N/A;  PNEUMONIA    BRONCHOSCOPY N/A 08/30/2024    Procedure: BRONCHOSCOPY WITH BALS AND WASHINGS;  Surgeon: Sandra Espinal MD;  Location: [facility] ENDOSCOPY;  Service: Pulmonary;  Laterality: N/A;  MUCOUS PLUGGING    CARDIAC CATHETERIZATION N/A 05/29/2024    Procedure: Left Heart Cath with possible coronary angioplasty;  Surgeon: Stephan Nichols MD;  Location: [facility] CATH INVASIVE LOCATION;  Service: Cardiology;  Laterality: N/A;    COLONOSCOPY N/A 06/2022    [facility]    HIP BIPOLAR REPLACEMENT Right 01/2000    INSERTION HEMODIALYSIS CATHETER Left 04/09/2024    Procedure: HEMODIALYSIS CATHETER INSERTION;  Surgeon: Jose Berry MD;  Location: [facility] MAIN OR;  Service: General;  Laterality: Left;    INSERTION HEMODIALYSIS CATHETER N/A 04/12/2024    Procedure: Tunneled hemodialysis catheter insertion;  Surgeon: Enrique Vinson MD;  Location: [facility] MAIN OR;  Service: Vascular;  Laterality: N/A;    INSERTION PERITONEAL DIALYSIS CATHETER N/A 03/27/2023    Procedure: LAPAROSCOPIC INSERTION PERITONEAL DIALYSIS CATHETER, LAPAROSCOPIC OMENTOPEXY WITH LYSIS OF ADHESIONS;  Surgeon: Jose Berry MD;  Location: [facility] OR;  Service: General;  Laterality: N/A;    INSERTION PERITONEAL DIALYSIS CATHETER Left 07/23/2023    Procedure: REVISION OF PERITONEAL DIALYSIS CATHETER;  Surgeon: Radha Oreilly MD;  Location: [facility] MAIN OR;  Service: General;  Laterality:  Left;    REMOVAL PERITONEAL DIALYSIS CATHETER N/A 2024    Procedure: REMOVAL PERITONEAL DIALYSIS CATHETER;  Surgeon: Jose Berry MD;  [facility] OR;  Service: General;  Laterality: N/A;    RENAL BIOPSY Left 07/15/2022    UPPER GASTROINTESTINAL ENDOSCOPY      WRIST SURGERY      UNSURE WHICH SIDE DAUGHTER REPORTS HAD SEVERE  CUT FROM WINDOW AND THEY HAD TO DO RECONSTRUCTIVE SURGERY WITH VESSELS AND NERVES       Past Psychiatric History: Depression    Psychiatric Hospitalizations: Denied    Suicide Attempts: Denied     Prior Treatment and Medications Tried: Zoloft, trazodone        Family History: family history includes Cancer (age of onset: 35) in his sister; Colon cancer (age of onset: 77) in his sister; Diabetes in his brother, mother, and sister; Heart disease in his father and paternal uncle; Sleep apnea in his daughter and paternal aunt. Otherwise pertinent FHx was reviewed and not pertinent to current issue.    Social History:     Social History     Socioeconomic History    Marital status:    Tobacco Use    Smoking status: Former     Current packs/day: 0.00     Average packs/day: 1 pack/day for 10.0 years (10.0 ttl pk-yrs)     Types: Cigarettes     Start date:      Quit date: 2000     Years since quittin.8     Passive exposure: Past    Smokeless tobacco: Never    Tobacco comments:     quit 25 years ago, chews nicotine gum in past   Vaping Use    Vaping status: Never Used   Substance and Sexual Activity    Alcohol use: Yes     Comment: 1 DRINK/DAY-rarely    Drug use: Never    Sexual activity: Defer       Substance Abuse History: reports that he quit smoking about 24 years ago. His smoking use included cigarettes. He started smoking about 34 years ago. He has a 10 pack-year smoking history. He has been exposed to tobacco smoke. He has never used smokeless tobacco. He reports current alcohol use. He reports that he does not use drugs.    Home  Medications:  Budeson-Glycopyrrol-Formoterol, Insulin Glargine, Levothyroxine Sodium, Methoxy PEG-Epoetin Beta, Risankizumab-rzaa, allopurinol, amitriptyline, apixaban, atorvastatin, carvedilol, cloNIDine, lidocaine-prilocaine, midodrine, pantoprazole, sertraline, sevelamer, torsemide, and traZODone      Allergies:  No Known Allergies    Objective   Objective     Vitals:   Temp:  [98.4 °F (36.9 °C)-98.6 °F (37 °C)] 98.4 °F (36.9 °C)  Heart Rate:  [70-82] 70  Resp:  [20-22] 22  BP: (140-182)/(64-98) 140/88  Physical Exam       Mental Status Exam: The patient is a 79-year-old male was laying comfortably in the bed, he responded appropriately to this writer greeting and then tried to cooperate with assessment in the best of his ability    Hygiene:   good  Cooperation:  Cooperative  Eye Contact:  Good  Psychomotor Behavior:  Appropriate  Mood: Fine  Affect:  Appropriate  Speech:  Normal  Language: clear  Thought Process:   confused at times  Thought Content:   talked about going to [place]  Suicidal:  None  Homicidal:  None  Hallucinations:  Not demonstrated today  Delusion:  None  Memory:  Unable to evaluate  Orientation:  Person,place,moth: October, year:2025  Reliability:  poor  Insight:  Fair  Judgement:  Fair  Impulse Control:  Good    Result Review    Result Review:  I have personally reviewed the results from the time of this admission to 10/27/2024 11:22 EDT and agree with these findings:  [x]  Laboratory  []  Microbiology  []  Radiology  [x]  EKG/Telemetry   []  Cardiology/Vascular   []  Pathology  []  Old records  []  Other:  Most notable findings include: negative CT of the head with no acute changes, anemia, Hb 8    Assessment & Plan   Assessment / Plan     Brief Patient Summary:  Nelson Wang is a 79 y.o. male who was admitted for altered mental status and presence of auditory hallucinations,had numerous medical issues included in stage renal disease on dialysis.    Active Hospital Problems:  Active  Hospital Problems    Diagnosis     **AMS (altered mental status)     Hallucinations     Abdominal pain     ESRD on dialysis     Essential hypertension     Type 2 diabetes mellitus without complication          Plan:   The patient presented as calm, no evidence of agitation and didn't appear to be RIS; will recommend risperidone 0.25 mg bid if it's okay with primary team.        Part of this note may be an electronic transcription/translation of spoken language to printed text using the Dragon Dictation System.    Electronically signed by Jessy Vásquez MD, 10/27/24, 11:22 AM EDT.

## 2024-10-27 NOTE — ED PROVIDER NOTES
Time: 3:49 AM EDT  Date of encounter:  10/27/2024  Independent Historian/Clinical History and Information was obtained by:   Patient and Family    History is limited by: N/A    Chief Complaint: Altered mental state      History of Present Illness:  Patient is a 79 y.o. year old male who presents to the emergency department for evaluation of altered mental state    Patient's daughter (Dr. Wang) and patient explained patient's had increasing confusion over the past 2 weeks since his most recent hospitalization.  Patient continues to have severe recurrent intermittent left upper quadrant abdominal pain.  They have tried multiple treatment modalities including lidocaine patches, pregabalin, oxycodone, amitriptyline, and trazodone.  They have all had varying success with treating pain because increasing confusion.  Patient does dialysis at home and has had dialysis the past 6 days.  Patient initially complained of pain and burning at his tunneled dialysis site.      Patient Care Team  Primary Care Provider: Domingo Bravo MD    Past Medical History:     No Known Allergies  Past Medical History:   Diagnosis Date    Abnormal stress test     Anemia     IRON INFUSIONS WITH DIALYSIS    Anesthesia     WITH HIP REPLACEMENT DAUGHTER BELIEVES HAD SVT 2000    Ankle pain, right     Anxiety and depression     CKD (chronic kidney disease), stage IV     DIALYSIS [id number] SHILEY IN RIGHT CHEST    Diabetes     Gout     High cholesterol     History of peritoneal dialysis     HL (hearing loss)     Hyperkalemia     Hypertension     Hypothyroidism     Insomnia     Night terrors     Psoriasis     Psoriasis     Sleep walking     Thrombocytopenia     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    Vitiligo      Past Surgical History:   Procedure Laterality Date    ANKLE SURGERY Right 11/1990    APPENDECTOMY N/A 1954    ARTERIOVENOUS FISTULA/SHUNT SURGERY Left 07/16/2024    Procedure: Creation of left arm arteriovenous fistula;   Surgeon: Moses Mir MD;  Location: [facility] MAIN OR;  Service: Vascular;  Laterality: Left;    BRONCHOSCOPY N/A 03/01/2024    Procedure: BRONCHOSCOPY WITH BAL AND WASHINGS;  Surgeon: Sandra Espinal MD;  Location: [facility] ENDOSCOPY;  Service: Pulmonary;  Laterality: N/A;  PNEUMONIA    BRONCHOSCOPY N/A 08/30/2024    Procedure: BRONCHOSCOPY WITH BALS AND WASHINGS;  Surgeon: Sandra Espinal MD;  Location: [facility] ENDOSCOPY;  Service: Pulmonary;  Laterality: N/A;  MUCOUS PLUGGING    CARDIAC CATHETERIZATION N/A 05/29/2024    Procedure: Left Heart Cath with possible coronary angioplasty;  Surgeon: Stephan Nichols MD;  Location: [facility] CATH INVASIVE LOCATION;  Service: Cardiology;  Laterality: N/A;    COLONOSCOPY N/A 06/2022    [facility]    HIP BIPOLAR REPLACEMENT Right 01/2000    INSERTION HEMODIALYSIS CATHETER Left 04/09/2024    Procedure: HEMODIALYSIS CATHETER INSERTION;  Surgeon: Jose Berry MD;  Location: [facility] MAIN OR;  Service: General;  Laterality: Left;    INSERTION HEMODIALYSIS CATHETER N/A 04/12/2024    Procedure: Tunneled hemodialysis catheter insertion;  Surgeon: Enrique Vinson MD;  Location: [facility] MAIN OR;  Service: Vascular;  Laterality: N/A;    INSERTION PERITONEAL DIALYSIS CATHETER N/A 03/27/2023    Procedure: LAPAROSCOPIC INSERTION PERITONEAL DIALYSIS CATHETER, LAPAROSCOPIC OMENTOPEXY WITH LYSIS OF ADHESIONS;  Surgeon: Jose Berry MD;  Location: [facility] MAIN OR;  Service: General;  Laterality: N/A;    INSERTION PERITONEAL DIALYSIS CATHETER Left 07/23/2023    Procedure: REVISION OF PERITONEAL DIALYSIS CATHETER;  Surgeon: Radha Oreilly MD;  Location: [facility] MAIN OR;  Service: General;  Laterality: Left;    REMOVAL PERITONEAL DIALYSIS CATHETER N/A 04/09/2024    Procedure: REMOVAL PERITONEAL DIALYSIS CATHETER;  Surgeon: Jose Berry MD;  Location: [facility] MAIN OR;  Service: General;  Laterality: N/A;    RENAL BIOPSY Left 07/15/2022    UPPER  GASTROINTESTINAL ENDOSCOPY      WRIST SURGERY      UNSURE WHICH SIDE DAUGHTER REPORTS HAD SEVERE  CUT FROM WINDOW AND THEY HAD TO DO RECONSTRUCTIVE SURGERY WITH VESSELS AND NERVES     Family History   Problem Relation Age of Onset    Diabetes Mother     Heart disease Father     Colon cancer Sister 77    Cancer Sister 35    Diabetes Sister     Diabetes Brother     Sleep apnea Paternal Aunt     Heart disease Paternal Uncle     Sleep apnea Daughter     Malig Hyperthermia Neg Hx        Home Medications:  Prior to Admission medications    Medication Sig Start Date End Date Taking? Authorizing Provider   allopurinol (ZYLOPRIM) 100 MG tablet Take 1 tablet by mouth Daily. As needed 3/11/24   Serena Matute MD   amitriptyline (ELAVIL) 10 MG tablet Take 1 tablet by mouth Every Night. 10/25/24      apixaban (ELIQUIS) 5 MG tablet tablet Take 1 tablet by mouth Every 12 (Twelve) Hours for 30 days. Indications: Atrial Fibrillation 10/3/24 11/3/24  Dayton Marcus MD   ARIPiprazole (ABILIFY) 2 MG tablet Take 1 tablet by mouth 2 (Two) Times a Day. 9/10/24   Domingo Bravo MD   atorvastatin (LIPITOR) 40 MG tablet Take 1 tablet by mouth Daily. 7/17/24   Domingo Bravo MD   Budeson-Glycopyrrol-Formoterol (Breztri Aerosphere) 160-9-4.8 MCG/ACT aerosol inhaler Inhale 2 puffs 2 (Two) Times a Day. 10/14/24   Sandra Espinal MD   carvedilol (COREG) 25 MG tablet Take 1 tablet by mouth 2 (Two) Times a Day With Meals.    Serena Matute MD   Cholecalciferol 20 MCG (800 UNIT) tablet Take 1 tablet by mouth Daily. 10/9/24   Domingo Bravo MD   cloNIDine (CATAPRES) 0.2 MG tablet Take 1 tablet by mouth Every 8 (Eight) Hours As Needed for High Blood Pressure. 6/18/24   Serena Matute MD   Insulin Glargine (LANTUS SOLOSTAR) 100 UNIT/ML injection pen Inject 10 Units under the skin into the appropriate area as directed Every Night. 4/14/24   Cristian Santacruz MD   Levothyroxine Sodium 175 MCG capsule Take 175  mcg by mouth Daily. 9/10/24   Domingo Bravo MD   lidocaine-prilocaine (EMLA) 2.5-2.5 % cream Apply 1 Application topically to the appropriate area as directed As Needed for Injection Site Pain. 24   Serena Matute MD   Lokelma 10 g packet Take 10 g by mouth Every Other Day. TAKES MON, WED, FRI, SUN 24   Serena Matute MD   Methoxy PEG-Epoetin Beta (MIRCERA IJ) 30 mcg Every 14 (Fourteen) Days. RECEIVES DURING DIALYSIS 24  Serena Matute MD   midodrine (PROAMATINE) 2.5 MG tablet Take 1 tablet by mouth 3 (Three) Times a Day Before Meals. On dialysis days  Patient taking differently: Take 1 tablet by mouth 3 (Three) Times a Day Before Meals. On dialysis days Mon , Tue ., Thurs., 24   Aylin Nicole APRN   pantoprazole (PROTONIX) 40 MG EC tablet Take 1 tablet by mouth Daily. 24   Sandra Espinal MD   sertraline (ZOLOFT) 100 MG tablet Take 1 tablet by mouth Every Night. 24   Domingo Bravo MD   sevelamer (RENVELA) 800 MG tablet Take 2 tablets by mouth 3 (Three) Times a Day With Meals for 30 days. 10/3/24 11/2/24  Dayton Marcus MD   torsemide (DEMADEX) 100 MG tablet Take 1 tablet by mouth Daily. 24   Domingo Bravo MD   traZODone (DESYREL) 50 MG tablet Take 1 tablet by mouth Every Night. 10/23/24   Domingo Bravo MD        Social History:   Social History     Tobacco Use    Smoking status: Former     Current packs/day: 0.00     Average packs/day: 1 pack/day for 10.0 years (10.0 ttl pk-yrs)     Types: Cigarettes     Start date:      Quit date: 2000     Years since quittin.8     Passive exposure: Past    Smokeless tobacco: Never    Tobacco comments:     quit 25 years ago, chews nicotine gum in past   Vaping Use    Vaping status: Never Used   Substance Use Topics    Alcohol use: Yes     Comment: 1 DRINK/DAY-rarely    Drug use: Never         Review of Systems:  Review of Systems   Unable to perform ROS: Mental status  "change        Physical Exam:  BP (!) 182/64   Pulse 82   Temp 98.6 °F (37 °C) (Oral)   Resp 20   Ht 170.2 cm (67\")   Wt 75.7 kg (166 lb 14.2 oz)   SpO2 93%   BMI 26.14 kg/m²     Physical Exam  Vitals and nursing note reviewed.   Constitutional:       General: He is not in acute distress.     Appearance: Normal appearance. He is not toxic-appearing.   HENT:      Head: Normocephalic and atraumatic.      Jaw: There is normal jaw occlusion.   Eyes:      General: Lids are normal.      Extraocular Movements: Extraocular movements intact.      Conjunctiva/sclera: Conjunctivae normal.      Pupils: Pupils are equal, round, and reactive to light.   Cardiovascular:      Rate and Rhythm: Normal rate and regular rhythm.      Pulses: Normal pulses.      Heart sounds: Normal heart sounds.      Comments: Right chest wall tunneled dialysis catheter appears clean dry and without evidence of infection.  There is no tenderness or discharge along its tunneled course.  Pulmonary:      Effort: Pulmonary effort is normal. No respiratory distress.      Breath sounds: Normal breath sounds. No wheezing or rhonchi.   Abdominal:      General: Abdomen is flat.      Palpations: Abdomen is soft.      Tenderness: There is no abdominal tenderness. There is no guarding or rebound.   Musculoskeletal:         General: Normal range of motion.      Cervical back: Normal range of motion and neck supple.      Right lower leg: No edema.      Left lower leg: No edema.   Skin:     General: Skin is warm and dry.   Neurological:      Mental Status: He is alert.      Comments: Patient intermittently appears to have visual hallucinations and responding to internal stimuli while in the room.  Patient seems comfortable during the majority of the evaluation although occasionally complains of severe intense left upper quadrant pain   Psychiatric:         Mood and Affect: Mood normal.                Procedures:  Procedures      Medical Decision " Making:      Comorbidities that affect care:    Hypertension, hypercholesterolemia, diabetes, vitiligo, psoriasis, end-stage renal disease on dialysis, chronic anticoagulation on Eliquis for brief episode of atrial fibrillation.    External Notes reviewed:    Previous Clinic Note: Outpatient PCP visit with Dr. Bravo end-stage renal disease 10/9/2024      The following orders were placed and all results were independently analyzed by me:  Orders Placed This Encounter   Procedures    XR Chest 1 View    [place] Draw    Comprehensive Metabolic Panel    Single High Sensitivity Troponin T    Magnesium    Urinalysis With Microscopic If Indicated (No Culture) - Urine, Clean Catch    CBC Auto Differential    High Sensitivity Troponin T 2Hr    Basic Metabolic Panel    Diet: Cardiac; Healthy Heart (2-3 Na+); Fluid Consistency: Thin (IDDSI 0)    Undress & Gown    Continuous Pulse Oximetry    Vital Signs    Orthostatic Blood Pressure    Vital Signs    Intake & Output    Weigh Patient    Oral Care    Place Sequential Compression Device    Maintain Sequential Compression Device    Maintain IV Access    Telemetry - Place Orders & Notify Provider of Results When Patient Experiences Acute Chest Pain, Dysrhythmia or Respiratory Distress    Up in Chair    Neuro Checks    Notify Provider (With Default Parameters)    Code Status and Medical Interventions: CPR (Attempt to Resuscitate); Full Support    Hospitalist (on-call MD unless specified)    Inpatient Psychiatrist Consult    Inpatient Gastroenterology Consult    Inpatient Case Management  Consult    OT Consult: Eval & Treat ADL Performance Below Baseline    PT Consult: Eval & Treat Discharge Placement Assessment, Functional Mobility Below Baseline    Oxygen Therapy- Nasal Cannula; Titrate 1-6 LPM Per SpO2; 90 - 95%    POC Glucose Once    ECG 12 Lead ED Triage Standing Order; Weak / Dizzy / AMS    Insert Peripheral IV    Insert Peripheral IV    Inpatient Admission     Fall Precautions    CBC & Differential    Green Top (Gel)    Lavender Top    Gold Top - SST    Light Blue Top    CBC & Differential       Medications Given in the Emergency Department:  Medications   sodium chloride 0.9 % flush 10 mL (has no administration in time range)   Lidocaine 4 % 1 patch (1 patch Transdermal Medication Applied 10/27/24 0614)   sodium chloride 0.9 % flush 10 mL (has no administration in time range)   sodium chloride 0.9 % flush 10 mL (has no administration in time range)   sodium chloride 0.9 % infusion 40 mL (has no administration in time range)   acetaminophen (TYLENOL) tablet 650 mg (has no administration in time range)     Or   acetaminophen (TYLENOL) 160 MG/5ML oral solution 650 mg (has no administration in time range)     Or   acetaminophen (TYLENOL) suppository 650 mg (has no administration in time range)   HYDROmorphone (DILAUDID) tablet 1 mg (has no administration in time range)   sennosides-docusate (PERICOLACE) 8.6-50 MG per tablet 2 tablet (has no administration in time range)     And   polyethylene glycol (MIRALAX) packet 17 g (has no administration in time range)     And   bisacodyl (DULCOLAX) EC tablet 5 mg (has no administration in time range)     And   bisacodyl (DULCOLAX) suppository 10 mg (has no administration in time range)   ondansetron ODT (ZOFRAN-ODT) disintegrating tablet 4 mg (has no administration in time range)     Or   ondansetron (ZOFRAN) injection 4 mg (has no administration in time range)   acetaminophen (OFIRMEV) injection 1,000 mg (1,000 mg Intravenous Given 10/27/24 0511)   famotidine (PEPCID) injection 20 mg (20 mg Intravenous Given 10/27/24 0614)   pantoprazole (PROTONIX) injection 40 mg (40 mg Intravenous Given 10/27/24 0614)        ED Course:    ED Course as of 10/27/24 0702   Sun Oct 27, 2024   0403 My interpretation of EKG: Sinus rhythm 77, poor baseline at limiting interpretation, no acute ischemia [JS]      ED Course User Index  [JS] Lorne Sánchez MD        Labs:    Lab Results (last 24 hours)       Procedure Component Value Units Date/Time    CBC & Differential [[id number]]  (Abnormal) Collected: 10/27/24 0418    Specimen: Blood Updated: 10/27/24 0425    Narrative:      The following orders were created for panel order CBC & Differential.  Procedure                               Abnormality         Status                     ---------                               -----------         ------                     CBC Auto Differential[[id number]]        Abnormal            Final result                 Please view results for these tests on the individual orders.    Comprehensive Metabolic Panel [[id number]]  (Abnormal) Collected: 10/27/24 0418    Specimen: Blood Updated: 10/27/24 0456     Glucose 155 mg/dL      BUN 29 mg/dL      Creatinine 4.96 mg/dL      Sodium 130 mmol/L      Potassium 3.2 mmol/L      Chloride 87 mmol/L      CO2 31.3 mmol/L      Calcium 9.1 mg/dL      Total Protein 7.4 g/dL      Albumin 3.5 g/dL      ALT (SGPT) 5 U/L      AST (SGOT) 10 U/L      Alkaline Phosphatase 78 U/L      Total Bilirubin 1.0 mg/dL      Globulin 3.9 gm/dL      A/G Ratio 0.9 g/dL      BUN/Creatinine Ratio 5.8     Anion Gap 11.7 mmol/L      eGFR 11.3 mL/min/1.73      Comment: <15 Indicative of kidney failure       Narrative:      GFR Normal >60  Chronic Kidney Disease <60  Kidney Failure <15    The GFR formula is only valid for adults with stable renal function between ages 18 and 70.    Single High Sensitivity Troponin T [[id number]]  (Abnormal) Collected: 10/27/24 0418    Specimen: Blood Updated: 10/27/24 0510     HS Troponin T 86 ng/L     Narrative:      High Sensitive Troponin T Reference Range:  <14.0 ng/L- Negative Female for AMI  <22.0 ng/L- Negative Male for AMI  >=14 - Abnormal Female indicating possible myocardial injury.  >=22 - Abnormal Male indicating possible myocardial injury.   Clinicians would have to utilize clinical acumen, EKG, Troponin, and serial changes to  determine if it is an Acute Myocardial Infarction or myocardial injury due to an underlying chronic condition.         Magnesium [[id number]]  (Normal) Collected: 10/27/24 0418    Specimen: Blood Updated: 10/27/24 0456     Magnesium 1.8 mg/dL     CBC Auto Differential [[id number]]  (Abnormal) Collected: 10/27/24 0418    Specimen: Blood Updated: 10/27/24 0425     WBC 7.70 10*3/mm3      RBC 2.75 10*6/mm3      Hemoglobin 8.0 g/dL      Hematocrit 26.0 %      MCV 94.5 fL      MCH 29.1 pg      MCHC 30.8 g/dL      RDW 16.5 %      RDW-SD 55.8 fl      MPV 9.3 fL      Platelets 211 10*3/mm3      Neutrophil % 66.9 %      Lymphocyte % 19.7 %      Monocyte % 12.5 %      Eosinophil % 0.1 %      Basophil % 0.3 %      Immature Grans % 0.5 %      Neutrophils, Absolute 5.15 10*3/mm3      Lymphocytes, Absolute 1.52 10*3/mm3      Monocytes, Absolute 0.96 10*3/mm3      Eosinophils, Absolute 0.01 10*3/mm3      Basophils, Absolute 0.02 10*3/mm3      Immature Grans, Absolute 0.04 10*3/mm3      nRBC 0.0 /100 WBC              Imaging:    XR Chest 1 View    Result Date: 10/27/2024  XR CHEST 1 VW Date of Exam: 10/27/2024 4:03 AM EDT Indication: Weak/Dizzy/AMS triage protocol Comparison: 10/8/2024 Findings: Dialysis catheter tips are at the right atrial level. Cardiomegaly is stable. There is atherosclerotic disease in the aorta. There is chronic elevation of the right hemidiaphragm. Lung volumes remain quite low. There is some vascular congestion. No definite acute infiltrates. No pneumothorax.     Stable cardiac enlargement with mild vascular congestion. Electronically Signed: Cristian Tiwari MD  10/27/2024 4:12 AM EDT  Workstation ID: [id number]       Differential Diagnosis and Discussion:    Altered Mental Status: Based on the patient's signs and symptoms, differential diagnosis includes but is not limited to meningitis, stroke, sepsis, subarachnoid hemorrhage, intracranial bleeding, encephalitis, and metabolic encephalopathy.    All labs were  reviewed and interpreted by me.  All X-rays impressions were independently interpreted by me.  EKG was interpreted by me.    MDM                     Patient Care Considerations:    SEPSIS IS NOT PRESENT IN THE EMERGENCY DEPARTMENT: Patient meets SIRS criteria in the emergency department however the patient does not have a known source of bacterial infection to confirm the diagnosis of sepsis.        Consultants/Shared Management Plan:    Hospitalist: I have discussed the case with the hospitalist who agrees to accept the patient for admission.    Social Determinants of Health:    Patient has presented with family members who are responsible, reliable and will ensure follow up care.      Disposition and Care Coordination:    Admit:   Through independent evaluation of the patient's history, physical, and imperical data, the patient meets criteria for inpatient admission to the hospital.        Final diagnoses:   Encephalopathy acute   Left upper quadrant pain        ED Disposition       ED Disposition   Decision to Admit    Condition   --    Comment   Level of Care: Telemetry [5]   Diagnosis: AMS (altered mental status) [[id number]]   Certification: I Certify That Inpatient Hospital Services Are Medically Necessary For Greater Than 2 Midnights                 This medical record created using voice recognition software.             Lorne Sánchez MD  10/27/24 0702

## 2024-10-28 ENCOUNTER — ANESTHESIA EVENT (OUTPATIENT)
Dept: GASTROENTEROLOGY | Facility: HOSPITAL | Age: 79
End: 2024-10-28
Payer: MEDICARE

## 2024-10-28 ENCOUNTER — ANESTHESIA (OUTPATIENT)
Dept: GASTROENTEROLOGY | Facility: HOSPITAL | Age: 79
End: 2024-10-28
Payer: MEDICARE

## 2024-10-28 ENCOUNTER — APPOINTMENT (OUTPATIENT)
Dept: GENERAL RADIOLOGY | Facility: HOSPITAL | Age: 79
End: 2024-10-28
Payer: MEDICARE

## 2024-10-28 PROBLEM — R10.12 LUQ PAIN: Status: ACTIVE | Noted: 2024-10-27

## 2024-10-28 LAB
ALBUMIN SERPL-MCNC: 3.2 G/DL (ref 3.5–5.2)
ALP SERPL-CCNC: 71 U/L (ref 39–117)
ALT SERPL W P-5'-P-CCNC: 5 U/L (ref 1–41)
ANION GAP SERPL CALCULATED.3IONS-SCNC: 11.1 MMOL/L (ref 5–15)
AST SERPL-CCNC: 9 U/L (ref 1–40)
BASOPHILS # BLD AUTO: 0.02 10*3/MM3 (ref 0–0.2)
BASOPHILS NFR BLD AUTO: 0.3 % (ref 0–1.5)
BILIRUB CONJ SERPL-MCNC: 0.4 MG/DL (ref 0–0.3)
BILIRUB INDIRECT SERPL-MCNC: 0.5 MG/DL
BILIRUB SERPL-MCNC: 0.9 MG/DL (ref 0–1.2)
BUN SERPL-MCNC: 37 MG/DL (ref 8–23)
BUN/CREAT SERPL: 6 (ref 7–25)
CALCIUM SPEC-SCNC: 9 MG/DL (ref 8.6–10.5)
CHLORIDE SERPL-SCNC: 87 MMOL/L (ref 98–107)
CO2 SERPL-SCNC: 28.9 MMOL/L (ref 22–29)
CREAT SERPL-MCNC: 6.14 MG/DL (ref 0.76–1.27)
DEPRECATED RDW RBC AUTO: 54.1 FL (ref 37–54)
EGFRCR SERPLBLD CKD-EPI 2021: 8.7 ML/MIN/1.73
EOSINOPHIL # BLD AUTO: 0.06 10*3/MM3 (ref 0–0.4)
EOSINOPHIL NFR BLD AUTO: 1 % (ref 0.3–6.2)
ERYTHROCYTE [DISTWIDTH] IN BLOOD BY AUTOMATED COUNT: 15.9 % (ref 12.3–15.4)
GLUCOSE BLDC GLUCOMTR-MCNC: 147 MG/DL (ref 70–99)
GLUCOSE SERPL-MCNC: 132 MG/DL (ref 65–99)
HCT VFR BLD AUTO: 24.6 % (ref 37.5–51)
HGB BLD-MCNC: 7.4 G/DL (ref 13–17.7)
IMM GRANULOCYTES # BLD AUTO: 0.04 10*3/MM3 (ref 0–0.05)
IMM GRANULOCYTES NFR BLD AUTO: 0.6 % (ref 0–0.5)
LYMPHOCYTES # BLD AUTO: 1.3 10*3/MM3 (ref 0.7–3.1)
LYMPHOCYTES NFR BLD AUTO: 21 % (ref 19.6–45.3)
MAGNESIUM SERPL-MCNC: 1.8 MG/DL (ref 1.6–2.4)
MCH RBC QN AUTO: 27.8 PG (ref 26.6–33)
MCHC RBC AUTO-ENTMCNC: 30.1 G/DL (ref 31.5–35.7)
MCV RBC AUTO: 92.5 FL (ref 79–97)
MONOCYTES # BLD AUTO: 0.75 10*3/MM3 (ref 0.1–0.9)
MONOCYTES NFR BLD AUTO: 12.1 % (ref 5–12)
NEUTROPHILS NFR BLD AUTO: 4.01 10*3/MM3 (ref 1.7–7)
NEUTROPHILS NFR BLD AUTO: 65 % (ref 42.7–76)
NRBC BLD AUTO-RTO: 0 /100 WBC (ref 0–0.2)
PHOSPHATE SERPL-MCNC: 4.1 MG/DL (ref 2.5–4.5)
PLATELET # BLD AUTO: 219 10*3/MM3 (ref 140–450)
PMV BLD AUTO: 9.5 FL (ref 6–12)
POTASSIUM SERPL-SCNC: 3.7 MMOL/L (ref 3.5–5.2)
PROT SERPL-MCNC: 6.9 G/DL (ref 6–8.5)
RBC # BLD AUTO: 2.66 10*6/MM3 (ref 4.14–5.8)
SODIUM SERPL-SCNC: 127 MMOL/L (ref 136–145)
T-UPTAKE NFR SERPL: 0.88 TBI (ref 0.8–1.3)
T4 SERPL-MCNC: 4.7 MCG/DL (ref 4.5–11.7)
TSH SERPL DL<=0.05 MIU/L-ACNC: 2.77 UIU/ML (ref 0.27–4.2)
WBC NRBC COR # BLD AUTO: 6.18 10*3/MM3 (ref 3.4–10.8)

## 2024-10-28 PROCEDURE — 85025 COMPLETE CBC W/AUTO DIFF WBC: CPT | Performed by: FAMILY MEDICINE

## 2024-10-28 PROCEDURE — 93010 ELECTROCARDIOGRAM REPORT: CPT | Performed by: STUDENT IN AN ORGANIZED HEALTH CARE EDUCATION/TRAINING PROGRAM

## 2024-10-28 PROCEDURE — 80048 BASIC METABOLIC PNL TOTAL CA: CPT | Performed by: FAMILY MEDICINE

## 2024-10-28 PROCEDURE — 93005 ELECTROCARDIOGRAM TRACING: CPT | Performed by: FAMILY MEDICINE

## 2024-10-28 PROCEDURE — 84443 ASSAY THYROID STIM HORMONE: CPT | Performed by: FAMILY MEDICINE

## 2024-10-28 PROCEDURE — 84436 ASSAY OF TOTAL THYROXINE: CPT | Performed by: FAMILY MEDICINE

## 2024-10-28 PROCEDURE — 99233 SBSQ HOSP IP/OBS HIGH 50: CPT | Performed by: FAMILY MEDICINE

## 2024-10-28 PROCEDURE — 25810000003 SODIUM CHLORIDE 0.9 % SOLUTION: Performed by: NURSE ANESTHETIST, CERTIFIED REGISTERED

## 2024-10-28 PROCEDURE — 84479 ASSAY OF THYROID (T3 OR T4): CPT | Performed by: FAMILY MEDICINE

## 2024-10-28 PROCEDURE — 73030 X-RAY EXAM OF SHOULDER: CPT

## 2024-10-28 PROCEDURE — 25010000002 PROPOFOL 10 MG/ML EMULSION: Performed by: NURSE ANESTHETIST, CERTIFIED REGISTERED

## 2024-10-28 PROCEDURE — 80076 HEPATIC FUNCTION PANEL: CPT | Performed by: FAMILY MEDICINE

## 2024-10-28 PROCEDURE — 84100 ASSAY OF PHOSPHORUS: CPT | Performed by: FAMILY MEDICINE

## 2024-10-28 PROCEDURE — 0DB78ZX EXCISION OF STOMACH, PYLORUS, VIA NATURAL OR ARTIFICIAL OPENING ENDOSCOPIC, DIAGNOSTIC: ICD-10-PCS | Performed by: INTERNAL MEDICINE

## 2024-10-28 PROCEDURE — 88305 TISSUE EXAM BY PATHOLOGIST: CPT | Performed by: INTERNAL MEDICINE

## 2024-10-28 PROCEDURE — 25010000002 HEPARIN (PORCINE) PER 1000 UNITS: Performed by: INTERNAL MEDICINE

## 2024-10-28 PROCEDURE — 97161 PT EVAL LOW COMPLEX 20 MIN: CPT

## 2024-10-28 PROCEDURE — 43239 EGD BIOPSY SINGLE/MULTIPLE: CPT | Performed by: INTERNAL MEDICINE

## 2024-10-28 PROCEDURE — 25010000002 LIDOCAINE PF 2% 2 % SOLUTION: Performed by: NURSE ANESTHETIST, CERTIFIED REGISTERED

## 2024-10-28 PROCEDURE — 83735 ASSAY OF MAGNESIUM: CPT | Performed by: FAMILY MEDICINE

## 2024-10-28 PROCEDURE — 88342 IMHCHEM/IMCYTCHM 1ST ANTB: CPT | Performed by: INTERNAL MEDICINE

## 2024-10-28 PROCEDURE — 82948 REAGENT STRIP/BLOOD GLUCOSE: CPT

## 2024-10-28 RX ORDER — PANTOPRAZOLE SODIUM 40 MG/1
40 TABLET, DELAYED RELEASE ORAL 2 TIMES DAILY
Status: DISCONTINUED | OUTPATIENT
Start: 2024-10-28 | End: 2024-11-01 | Stop reason: HOSPADM

## 2024-10-28 RX ORDER — SODIUM CHLORIDE 9 MG/ML
9 INJECTION, SOLUTION INTRAVENOUS CONTINUOUS
Status: CANCELLED | OUTPATIENT
Start: 2024-10-28 | End: 2024-10-28

## 2024-10-28 RX ORDER — PROPOFOL 10 MG/ML
VIAL (ML) INTRAVENOUS AS NEEDED
Status: DISCONTINUED | OUTPATIENT
Start: 2024-10-28 | End: 2024-10-28 | Stop reason: SURG

## 2024-10-28 RX ORDER — RISPERIDONE 0.25 MG/1
0.12 TABLET ORAL EVERY 12 HOURS SCHEDULED
Status: DISCONTINUED | OUTPATIENT
Start: 2024-10-28 | End: 2024-10-28

## 2024-10-28 RX ORDER — ATORVASTATIN CALCIUM 40 MG/1
40 TABLET, FILM COATED ORAL NIGHTLY
Status: DISCONTINUED | OUTPATIENT
Start: 2024-10-28 | End: 2024-11-01 | Stop reason: HOSPADM

## 2024-10-28 RX ORDER — RISPERIDONE 0.25 MG/1
0.12 TABLET ORAL EVERY 12 HOURS SCHEDULED
Status: DISCONTINUED | OUTPATIENT
Start: 2024-10-28 | End: 2024-10-30

## 2024-10-28 RX ORDER — SODIUM CHLORIDE 9 MG/ML
INJECTION, SOLUTION INTRAVENOUS CONTINUOUS PRN
Status: DISCONTINUED | OUTPATIENT
Start: 2024-10-28 | End: 2024-10-28 | Stop reason: SURG

## 2024-10-28 RX ORDER — GABAPENTIN 100 MG/1
100 CAPSULE ORAL NIGHTLY
Status: DISCONTINUED | OUTPATIENT
Start: 2024-10-28 | End: 2024-10-28

## 2024-10-28 RX ORDER — TROLAMINE SALICYLATE 10 G/100G
1 CREAM TOPICAL EVERY 6 HOURS PRN
Status: DISCONTINUED | OUTPATIENT
Start: 2024-10-28 | End: 2024-11-01 | Stop reason: HOSPADM

## 2024-10-28 RX ORDER — CARVEDILOL 12.5 MG/1
12.5 TABLET ORAL EVERY 12 HOURS SCHEDULED
Status: DISCONTINUED | OUTPATIENT
Start: 2024-10-28 | End: 2024-11-01 | Stop reason: HOSPADM

## 2024-10-28 RX ORDER — GABAPENTIN 100 MG/1
100 CAPSULE ORAL DAILY
Status: DISCONTINUED | OUTPATIENT
Start: 2024-10-28 | End: 2024-10-29

## 2024-10-28 RX ORDER — GABAPENTIN 100 MG/1
100 CAPSULE ORAL ONCE
Status: COMPLETED | OUTPATIENT
Start: 2024-10-28 | End: 2024-10-28

## 2024-10-28 RX ORDER — SEVELAMER CARBONATE 800 MG/1
1600 TABLET, FILM COATED ORAL
Status: DISCONTINUED | OUTPATIENT
Start: 2024-10-28 | End: 2024-11-01 | Stop reason: HOSPADM

## 2024-10-28 RX ORDER — HEPARIN SODIUM 1000 [USP'U]/ML
3800 INJECTION, SOLUTION INTRAVENOUS; SUBCUTANEOUS AS NEEDED
Status: DISCONTINUED | OUTPATIENT
Start: 2024-10-28 | End: 2024-11-01 | Stop reason: HOSPADM

## 2024-10-28 RX ORDER — LIDOCAINE HYDROCHLORIDE 20 MG/ML
INJECTION, SOLUTION EPIDURAL; INFILTRATION; INTRACAUDAL; PERINEURAL AS NEEDED
Status: DISCONTINUED | OUTPATIENT
Start: 2024-10-28 | End: 2024-10-28 | Stop reason: SURG

## 2024-10-28 RX ORDER — CLONIDINE HYDROCHLORIDE 0.1 MG/1
0.1 TABLET ORAL EVERY 8 HOURS SCHEDULED
Status: DISCONTINUED | OUTPATIENT
Start: 2024-10-28 | End: 2024-11-01 | Stop reason: HOSPADM

## 2024-10-28 RX ADMIN — PANTOPRAZOLE SODIUM 40 MG: 40 TABLET, DELAYED RELEASE ORAL at 08:08

## 2024-10-28 RX ADMIN — LIDOCAINE HYDROCHLORIDE 40 MG: 20 INJECTION, SOLUTION INTRAVENOUS at 12:56

## 2024-10-28 RX ADMIN — SODIUM CHLORIDE: 9 INJECTION, SOLUTION INTRAVENOUS at 12:53

## 2024-10-28 RX ADMIN — Medication 10 ML: at 20:44

## 2024-10-28 RX ADMIN — PROPOFOL 10 MG: 10 INJECTION, EMULSION INTRAVENOUS at 12:58

## 2024-10-28 RX ADMIN — CARVEDILOL 12.5 MG: 12.5 TABLET, FILM COATED ORAL at 08:08

## 2024-10-28 RX ADMIN — HEPARIN SODIUM 3800 UNITS: 1000 INJECTION INTRAVENOUS; SUBCUTANEOUS at 18:46

## 2024-10-28 RX ADMIN — PANTOPRAZOLE SODIUM 40 MG: 40 TABLET, DELAYED RELEASE ORAL at 20:44

## 2024-10-28 RX ADMIN — CLONIDINE HYDROCHLORIDE 0.1 MG: 0.1 TABLET ORAL at 08:08

## 2024-10-28 RX ADMIN — RISPERIDONE 0.12 MG: 0.25 TABLET ORAL at 20:52

## 2024-10-28 RX ADMIN — CLONIDINE HYDROCHLORIDE 0.1 MG: 0.1 TABLET ORAL at 14:08

## 2024-10-28 RX ADMIN — GABAPENTIN 100 MG: 100 CAPSULE ORAL at 20:43

## 2024-10-28 RX ADMIN — ACETAMINOPHEN 650 MG: 325 TABLET ORAL at 14:08

## 2024-10-28 RX ADMIN — ATORVASTATIN CALCIUM 40 MG: 40 TABLET, FILM COATED ORAL at 20:44

## 2024-10-28 RX ADMIN — ACETAMINOPHEN 650 MG: 325 TABLET ORAL at 06:34

## 2024-10-28 RX ADMIN — ACETAMINOPHEN 650 MG: 325 TABLET ORAL at 20:44

## 2024-10-28 RX ADMIN — CARVEDILOL 12.5 MG: 12.5 TABLET, FILM COATED ORAL at 20:44

## 2024-10-28 RX ADMIN — PROPOFOL 30 MG: 10 INJECTION, EMULSION INTRAVENOUS at 12:56

## 2024-10-28 RX ADMIN — LEVOTHYROXINE SODIUM 175 MCG: 75 TABLET ORAL at 08:08

## 2024-10-28 NOTE — SIGNIFICANT NOTE
10/28/24 1301   OTHER   Discipline occupational therapist   Rehab Time/Intention   Session Not Performed patient unavailable for evaluation  (Sleeping durig 1st attempt and now in endo.)

## 2024-10-28 NOTE — ANESTHESIA POSTPROCEDURE EVALUATION
Patient: Nelson Wang    Procedure Summary       Date: 10/28/24 Room / Location: [facility] ENDOSCOPY 4 / [facility] ENDOSCOPY    Anesthesia Start: 1253 Anesthesia Stop: 1313    Procedure: ESOPHAGOGASTRODUODENOSCOPY INSERTION OF LIGHTED INSTRUMENT TO VIEW ESOPHAGUS, STOMACH AND SMALL INTESTINE Diagnosis:       LUQ pain      (LUQ pain [R10.12])    Surgeons: Kingsley Peraza MD Provider: Bel Lamar CRNA    Anesthesia Type: general ASA Status: 3            Anesthesia Type: general    Vitals  Vitals Value Taken Time   /71 10/28/24 1329   Temp 36.5 °C (97.7 °F) 10/28/24 1324   Pulse 63 10/28/24 1331   Resp 14 10/28/24 1324   SpO2 99 % 10/28/24 1331   Vitals shown include unfiled device data.        Post Anesthesia Care and Evaluation    Patient location during evaluation: bedside  Patient participation: complete - patient participated  Level of consciousness: awake  Pain management: adequate    Airway patency: patent  Anesthetic complications: No anesthetic complications  PONV Status: controlled  Cardiovascular status: acceptable and stable  Respiratory status: acceptable

## 2024-10-28 NOTE — PLAN OF CARE
Goal Outcome Evaluation:  Plan of Care Reviewed With: (P) patient        Progress: (P) improving  Outcome Evaluation: (P) Pt presents to treatment with complaints of confusion and weakness in BLE. Was able to ambulate with minimal difficulty but significant confusion causing them to sit randomly. Pt would continue to benefit from the use of skilled physical therapy to address BLE strength and muscular endurance deficits required for ambulation.    Anticipated Discharge Disposition (PT): (P) inpatient rehabilitation facility

## 2024-10-28 NOTE — THERAPY EVALUATION
Acute Care - Physical Therapy Initial Evaluation  BRIA Sullivan     Patient Name: Nelson Wang  : 1945  MRN: [id number]  Today's Date: 10/28/2024      Visit Dx:     ICD-10-CM ICD-9-CM   1. Encephalopathy acute  G93.40 348.30   2. Left upper quadrant pain  R10.12 789.02   3. LUQ pain  R10.12 789.02   4. Difficulty in walking  R26.2 719.7     Patient Active Problem List   Diagnosis    Essential hypertension    Bradycardia, sinus    Anemia due to chronic kidney disease    Hypothyroidism    Idiopathic gout    Proteinuria    Psoriasis    Thrombocytopenia    Type 2 diabetes mellitus without complication    CKD (chronic kidney disease), stage IV    Hyperlipidemia    Monoclonal gammopathy of unknown significance (MGUS)    Stage 5 chronic kidney disease    Chronic gout without tophus    Peritoneal dialysis catheter dysfunction    Medicare annual wellness visit, subsequent    Chronic cough    Peritonitis associated with peritoneal dialysis    Recurrent pneumonia    Acute on chronic respiratory failure with hypoxia    End stage renal disease    Aspiration pneumonitis    Sepsis    Type 2 diabetes mellitus, with long-term current use of insulin    GERD without esophagitis    Immunosuppression due to drug therapy    Bipolar disorder    Chronic respiratory failure with hypoxia    Shortness of breath    Abnormal stress test    ESRD on dialysis    Abnormal chest CT    Encounter for screening for malignant neoplasm of colon    History of colon polyps    Family history of colon cancer    Intractable pain    Abdominal pain    ESRD (end stage renal disease) on dialysis    AMS (altered mental status)    Hallucinations    LUQ pain     Past Medical History:   Diagnosis Date    Abnormal stress test     Anemia     IRON INFUSIONS WITH DIALYSIS    Anesthesia     WITH HIP REPLACEMENT DAUGHTER BELIEVES HAD SVT     Ankle pain, right     Anxiety and depression     CKD (chronic kidney disease), stage IV     DIALYSIS [id number]  AZRA IN RIGHT CHEST    Diabetes     Gout     High cholesterol     History of peritoneal dialysis     HL (hearing loss)     Hyperkalemia     Hypertension     Hypothyroidism     Insomnia     Night terrors     Psoriasis     Psoriasis     Sleep walking     Thrombocytopenia     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    Vitiligo      Past Surgical History:   Procedure Laterality Date    ANKLE SURGERY Right 11/1990    APPENDECTOMY N/A 1954    ARTERIOVENOUS FISTULA/SHUNT SURGERY Left 07/16/2024    Procedure: Creation of left arm arteriovenous fistula;  Surgeon: Moses Mir MD;  Location: [facility] MAIN OR;  Service: Vascular;  Laterality: Left;    BRONCHOSCOPY N/A 03/01/2024    Procedure: BRONCHOSCOPY WITH BAL AND WASHINGS;  Surgeon: Sandra Espinal MD;  Location: [facility] ENDOSCOPY;  Service: Pulmonary;  Laterality: N/A;  PNEUMONIA    BRONCHOSCOPY N/A 08/30/2024    Procedure: BRONCHOSCOPY WITH BALS AND WASHINGS;  Surgeon: Sandra Espinal MD;  Location: [facility] ENDOSCOPY;  Service: Pulmonary;  Laterality: N/A;  MUCOUS PLUGGING    CARDIAC CATHETERIZATION N/A 05/29/2024    Procedure: Left Heart Cath with possible coronary angioplasty;  Surgeon: Stephan Nichols MD;  Location: [facility] CATH INVASIVE LOCATION;  Service: Cardiology;  Laterality: N/A;    COLONOSCOPY N/A 06/2022    [facility]    HIP BIPOLAR REPLACEMENT Right 01/2000    INSERTION HEMODIALYSIS CATHETER Left 04/09/2024    Procedure: HEMODIALYSIS CATHETER INSERTION;  Surgeon: Jose Berry MD;  Location: [facility] OR;  Service: General;  Laterality: Left;    INSERTION HEMODIALYSIS CATHETER N/A 04/12/2024    Procedure: Tunneled hemodialysis catheter insertion;  Surgeon: Enrique Vinson MD;  Location: [facility] OR;  Service: Vascular;  Laterality: N/A;    INSERTION PERITONEAL DIALYSIS CATHETER N/A 03/27/2023    Procedure: LAPAROSCOPIC INSERTION PERITONEAL DIALYSIS CATHETER, LAPAROSCOPIC OMENTOPEXY WITH LYSIS OF ADHESIONS;  Surgeon: Jose Berry  MD Tay;  Location: [facility] MAIN OR;  Service: General;  Laterality: N/A;    INSERTION PERITONEAL DIALYSIS CATHETER Left 07/23/2023    Procedure: REVISION OF PERITONEAL DIALYSIS CATHETER;  Surgeon: Radha Oreilly MD;  Location: [facility] MAIN OR;  Service: General;  Laterality: Left;    REMOVAL PERITONEAL DIALYSIS CATHETER N/A 04/09/2024    Procedure: REMOVAL PERITONEAL DIALYSIS CATHETER;  Surgeon: Jose Berry MD;  Location: [facility] MAIN OR;  Service: General;  Laterality: N/A;    RENAL BIOPSY Left 07/15/2022    UPPER GASTROINTESTINAL ENDOSCOPY      WRIST SURGERY      UNSURE WHICH SIDE DAUGHTER REPORTS HAD SEVERE  CUT FROM WINDOW AND THEY HAD TO DO RECONSTRUCTIVE SURGERY WITH VESSELS AND NERVES     PT Assessment (Last 12 Hours)       PT Evaluation and Treatment       Row Name 10/28/24 1322          Physical Therapy Time and Intention    Subjective Information complains of;weakness;fatigue (P)   -KL     Document Type evaluation (P)   -KL     Mode of Treatment individual therapy;physical therapy (P)   -KL     Total Minutes, Physical Therapy 30 (P)   -KL     Patient Effort fair (P)   -KL     Symptoms Noted During/After Treatment fatigue;dizziness;increased pain (P)   -KL       Row Name 10/28/24 1322          General Information    Patient Profile Reviewed yes (P)   -KL     Patient Observations agree to therapy;lethargic;poorly cooperative (P)   -KL     Prior Level of Function independent: (P)   -KL     Equipment Currently Used at Home none (P)   -KL     Existing Precautions/Restrictions no known precautions/restrictions (P)   -KL     Risks Reviewed patient: (P)   -KL     Benefits Reviewed patient: (P)   -KL     Barriers to Rehab none identified (P)   -KL       Row Name 10/28/24 1322          Previous Level of Function/Home Environm    Bathing, Previous Functional Level independent (P)   -KL     Grooming, Previous Functional Level independent (P)   -KL     Dressing, Previous Functional Level  independent (P)   -KL     Eating/Feeding, Previous Functional Level independent (P)   -KL     Toileting, Previous Functional Level independent (P)   -KL     BADLs, Previous Functional Level independent (P)   -KL     IADLs, Previous Functional Level independent (P)   -KL     Bed Mobility, Previous Functional Level independent (P)   -KL     Transfers, Previous Functional Level independent (P)   -KL     Household Ambulation, Previous Functional Level uses device or equipment (P)   -KL     Stairs, Previous Functional Level independent (P)   -KL     Community Ambulation, Previous Functional Level uses device or equipment (P)   -       Row Name 10/28/24 1322          Living Environment    Current Living Arrangements home (P)   -     People in Home spouse (P)   -     Primary Care Provided by self (P)   -       Row Name 10/28/24 1322          Home Use of Assistive/Adaptive Equipment    Equipment Currently Used at Home cane, quad (P)   -       Row Name 10/28/24 1322          Range of Motion (ROM)    Range of Motion ROM is WFL (P)   -       Row Name 10/28/24 1322          Strength (Manual Muscle Testing)    Strength (Manual Muscle Testing) other (see comments) (P)   Unable to test d/t AMS  -       Row Name 10/28/24 1322          Bed Mobility    Bed Mobility bed mobility (all) activities (P)   -     All Activities, [place] (Bed Mobility) contact guard (P)   -       Row Name 10/28/24 1322          Transfers    Transfers sit-stand transfer;stand-sit transfer (P)   -     Maintains Weight-bearing Status (Transfers) able to maintain (P)   -       Row Name 10/28/24 1322          Sit-Stand Transfer    Sit-Stand [place]on (Transfers) contact guard (P)   -     Assistive Device (Sit-Stand Transfers) walker, front-wheeled (P)   -       Row Name 10/28/24 1322          Stand-Sit Transfer    Stand-Sit [place]on (Transfers) contact guard (P)   -     Assistive Device (Stand-Sit Transfers) walker,  front-wheeled (P)   -KL       Row Name 10/28/24 1322          Gait/Stairs (Locomotion)    Gait/Stairs Locomotion gait/ambulation assistive device (P)   -KL     [place] Level (Gait) contact guard (P)   -KL     Assistive Device (Gait) walker, front-wheeled (P)   -KL     Patient was able to Ambulate yes (P)   -KL     Distance in Feet (Gait) 30 (P)   -KL     Pattern (Gait) 2-point (P)   -KL     Deviations/Abnormal Patterns (Gait) stride length decreased;weight shifting decreased (P)   -KL       Row Name 10/28/24 1322          Safety Issues/Impairments Affecting Functional Mobility    Impairments Affecting Function (Mobility) balance;cognition;coordination;motor control;pain;range of motion (ROM);shortness of breath;strength (P)   -KL     Cognitive Impairments, Mobility Safety/Performance attention;impulsivity;judgment;sequencing abilities;awareness, need for assistance (P)   -       Row Name 10/28/24 1322          Balance    Balance Assessment standing dynamic balance (P)   -KL     Dynamic Standing Balance contact guard (P)   -KL     Position/Device Used, Standing Balance walker, front-wheeled (P)   -KL     Balance Interventions standing (P)   -       Row Name 10/28/24 1322          Plan of Care Review    Plan of Care Reviewed With patient (P)   -KL     Progress improving (P)   -KL     Outcome Evaluation Pt presents to treatment with complaints of confusion and weakness in BLE. Was able to ambulate with minimal difficulty but significant confusion causing them to sit randomly. Pt would continue to benefit from the use of skilled physical therapy to address BLE strength and muscular endurance deficits required for ambulation. (P)   -KL       Row Name 10/28/24 1322          Positioning and Restraints    Pre-Treatment Position in bed (P)   -KL     Post Treatment Position bed (P)   -KL     In Bed supine (P)   -       Row Name 10/28/24 1322          Therapy Assessment/Plan (PT)    Rehab Potential (PT) fair (P)    -     Criteria for Skilled Interventions Met (PT) yes (P)   -KL     Therapy Frequency (PT) daily (P)   -     Predicted Duration of Therapy Intervention (PT) 10 days (P)   -     Problem List (PT) problems related to;balance;cognition;coordination;mobility;range of motion (ROM);strength;pain;postural control (P)   -     Activity Limitations Related to Problem List (PT) unable to ambulate safely (P)   -       Row Name 10/28/24 1322          Therapy Plan Review/Discharge Plan (PT)    Therapy Plan Review (PT) evaluation/treatment results reviewed (P)   -       Row Name 10/28/24 1322          Physical Therapy Goals    Transfer Goal Selection (PT) transfer, PT goal 1 (P)   -     Gait Training Goal Selection (PT) gait training, PT goal 1 (P)   -     Strength Goal Selection (PT) strength, PT goal 1 (P)   -       Row Name 10/28/24 1322          Transfer Goal 1 (PT)    Activity/Assistive Device (Transfer Goal 1, PT) transfers, all (P)   -KL     [place] Level/Cues Needed (Transfer Goal 1, PT) independent (P)   -KL     Time Frame (Transfer Goal 1, PT) long term goal (LTG);10 days (P)   -       Row Name 10/28/24 1322          Gait Training Goal 1 (PT)    Activity/Assistive Device (Gait Training Goal 1, PT) gait (walking locomotion) (P)   -KL     [place] Level (Gait Training Goal 1, PT) independent (P)   -KL     Distance (Gait Training Goal 1, PT) 250 ft (P)   -KL     Time Frame (Gait Training Goal 1, PT) long term goal (LTG);10 days (P)   -       Row Name 10/28/24 1322          Strength Goal 1 (PT)    Strength Goal 1 (PT) Pt will achieve score of 5/5 for bilateral hip flex MMTs (P)   -     Time Frame (Strength Goal 1, PT) long term goal (LTG);10 days (P)   -               User Key  (r) = Recorded By, (t) = Taken By, (c) = Cosigned By      Initials Name Provider Type    Cheryl Becerra, PT Student PT Student                    Physical Therapy Education       Title: PT OT SLP Therapies (Done)        Topic: Physical Therapy (Done)       Point: Mobility training (Done)       Learning Progress Summary            Patient Acceptance, E,TB, VU by  at 10/28/2024 1335                      Point: Home exercise program (Done)       Learning Progress Summary            Patient Acceptance, E,TB, VU by  at 10/28/2024 1335                      Point: Body mechanics (Done)       Learning Progress Summary            Patient Acceptance, E,TB, VU by  at 10/28/2024 1335                      Point: Precautions (Done)       Learning Progress Summary            Patient Acceptance, E,TB, VU by  at 10/28/2024 1335                                      User Key       Initials Effective Dates Name Provider Type Discipline     08/27/24 -  Cheryl Villanueva, PT Student PT Student PT                  PT Recommendation and Plan  Anticipated Discharge Disposition (PT): (P) inpatient rehabilitation facility  Planned Therapy Interventions (PT): (P) balance training, bed mobility training, gait training, home exercise program, postural re-education, ROM (range of motion), stair training, strengthening, stretching, transfer training  Therapy Frequency (PT): (P) daily  Plan of Care Reviewed With: (P) patient  Progress: (P) improving  Outcome Evaluation: (P) Pt presents to treatment with complaints of confusion and weakness in BLE. Was able to ambulate with minimal difficulty but significant confusion causing them to sit randomly. Pt would continue to benefit from the use of skilled physical therapy to address BLE strength and muscular endurance deficits required for ambulation.   Outcome Measures       Row Name 10/28/24 1300             How much help from another person do you currently need...    Turning from your back to your side while in flat bed without using bedrails? 4 (P)   -KL      Moving from lying on back to sitting on the side of a flat bed without bedrails? 4 (P)   -KL      Moving to and from a bed to a chair (including a  wheelchair)? 4 (P)   -KL      Standing up from a chair using your arms (e.g., wheelchair, bedside chair)? 4 (P)   -KL      Climbing 3-5 steps with a railing? 3 (P)   -KL      To walk in hospital room? 3 (P)   -KL      AM-PAC 6 Clicks Score (PT) 22 (P)   -KL         Functional Assessment    Outcome Measure Options AM-PAC 6 Clicks Basic Mobility (PT) (P)   -KL                User Key  (r) = Recorded By, (t) = Taken By, (c) = Cosigned By      Initials Name Provider Type    Cheryl Becerra, PT Student PT Student                     Time Calculation:    PT Charges       Row Name 10/28/24 1322             Time Calculation    PT Received On 10/28/24 (P)   -KL      PT Goal Re-Cert Due Date 11/06/24 (P)   -KL         Time Calculation- PT    Total Timed Code Minutes- PT 30 minute(s) (P)   -KL         Untimed Charges    PT Eval/Re-eval Minutes 30 (P)   -KL         Total Minutes    Untimed Charges Total Minutes 30 (P)   -KL       Total Minutes 30 (P)   -KL                User Key  (r) = Recorded By, (t) = Taken By, (c) = Cosigned By      Initials Name Provider Type    Cheryl Becerra PT Student PT Student                  Therapy Charges for Today       Code Description Service Date Service Provider Modifiers Qty    78061648618 HC PT EVAL LOW COMPLEXITY 2 10/28/2024 Cheryl Villanueva PT Student GP 1            PT G-Codes  Outcome Measure Options: (P) AM-PAC 6 Clicks Basic Mobility (PT)  AM-PAC 6 Clicks Score (PT): (P) 22    JAYME Alas  10/28/2024

## 2024-10-28 NOTE — PROGRESS NOTES
[facility]   Hospitalist Progress Note  Date: 10/28/2024  Patient Name: Nelson Wang  : 1945  MRN: [id number]  Date of admission: 10/27/2024      Subjective   Subjective     Chief complaint: Altered mental status, hallucinations    Summary:  79-year-old male with ESRD on home dialysis, dyslipidemia, hypertension, hypothyroidism, history of night terrors, chronic thrombocytopenia, anxiety, depression, recent history of L3 compression fracture, with degenerative changes through the lumbar spine, diabetes, history of alcohol use in excess, ex-smoker, hospitalized on 10/27/2024 for chief complaint of altered mental status and hallucinations, prior to hospitalization was having several medication changes to determine the best medications to manage symptoms.  Held several medications, nephrology consulted, patient has elevated troponin which is chronic and related to renal disease.  Degree of troponin elevation with negative delta is not indicative of cardiac issue.  Ongoing abdominal pain complaints, GI consulted, planning EGD and scope.  Psychiatry consulted, recommending risperidone initiation at 0.25 mg twice daily    Interval follow-up: Patient seen and examined this morning, no acute distress, no acute major overnight events, no major events on telemetry, no chest pain symptoms, continues to complain of abdominal discomfort and back pain, no nausea vomiting, easily reoriented but still confused at baseline, subtle but present confusion.  Eager to try gabapentin.  Thyroid function evaluated, normal range.  Creatinine 6.14, BUN 37, potassium 3.7, sodium 127.  White blood cell count 6000, hemoglobin 7.4.  Still hallucinating    Review of systems:  All systems reviewed and negative except for weakness, fatigue, back pain, confusion, hallucinations      Objective   Objective     Vitals:   Temp:  [97.3 °F (36.3 °C)-98.4 °F (36.9 °C)] 97.5 °F (36.4 °C)  Heart Rate:  [71-76] 72  Resp:  [14-22] 18  BP:  (148-184)/(59-85) 161/85  Flow (L/min) (Oxygen Therapy):  [2] 2  Physical Exam   Constitutional: Awake, alert, disoriented but pleasantly confused  Eyes: Pupils equal, sclerae anicteric, no conjunctival injection  HENT: NCAT, mucous membranes moist  Neck: Supple, full range of motion  Respiratory: Diminished coarse breath sounds with no rhonchi or wheezing, very fine crackles on the right side  Cardiovascular: RRR, no murmurs, rubs, or gallops, palpable pedal pulses bilaterally  Gastrointestinal: Positive bowel sounds, soft, nontender, distended  Musculoskeletal: No bilateral ankle edema, no clubbing or cyanosis to extremities  Psychiatric: Calm and cooperative  Neurologic: Disoriented but alert and awake and pleasantly confused, strength symmetric in all extremities, Cranial Nerves grossly intact to confrontation, speech clear  Skin: No rashes visible on exposed skin     Result Review    Result Review:  I have personally reviewed the pertinent results from the past 24 hours to 10/28/2024 10:38 EDT and agree with these findings:  [x]  Laboratory   CBC          10/14/2024    00:00 10/27/2024    04:18 10/28/2024    04:33   CBC   WBC  7.70  6.18    RBC  2.75  2.66    Hemoglobin 9        27     8.0  7.4    Hematocrit  26.0  24.6    MCV  94.5  92.5    MCH  29.1  27.8    MCHC  30.8  30.1    RDW  16.5  15.9    Platelets  211  219       Details          This result is from an external source.             BMP          10/8/2024    15:00 10/27/2024    04:18 10/28/2024    04:33   BMP   BUN 33  29  37    Creatinine 4.51  4.96  6.14    Sodium 131  130  127    Potassium 3.9  3.2  3.7    Chloride 88  87  87    CO2 31.5  31.3  28.9    Calcium 9.5  9.1  9.0      LIVER FUNCTION TESTS:      Lab 10/28/24  0433 10/27/24  0418   TOTAL PROTEIN 6.9 7.4   ALBUMIN 3.2* 3.5   GLOBULIN  --  3.9   ALT (SGPT) 5 5   AST (SGOT) 9 10   BILIRUBIN 0.9 1.0   INDIRECT BILIRUBIN 0.5  --    BILIRUBIN DIRECT 0.4*  --    ALK PHOS 71 78       [x]   Microbiology   Microbiology Results (last 10 days)       ** No results found for the last 240 hours. **              [x]  Radiology XR Chest 1 View    Result Date: 10/27/2024  Stable cardiac enlargement with mild vascular congestion. Electronically Signed: Cristian Tiwari MD  10/27/2024 4:12 AM EDT  Workstation ID: [id number]       [x]  EKG/Telemetry   ECG 12 Lead ED Triage Standing Order; Weak / Dizzy / AMS   Preliminary Result   HEART RATE=77  bpm   RR Gkbhpmbz=485  ms   VA Fhswwzjc=906  ms   P Horizontal Axis=10  deg   P Front Axis=-57  deg   QRSD Interval=92  ms   QT Ahrugoks=484  ms   YJhH=427  ms   QRS Axis=-30  deg   T Wave Axis=34  deg   - ABNORMAL ECG -   Sinus or ectopic atrial rhythm   Left axis deviation   Probable anterior infarct, age indeterminate   Prolonged QT interval   Date and Time of Study:2024-10-27 04:01:14          []  Cardiology/Vascular   []  Pathology  [x]  Old records  []  Other:    Assessment & Plan   Assessment / Plan     Assessment/Plan:  Assessment:  Hallucinations  Abdominal pain  Anxiety  Depression  ESRD on HD  Dyslipidemia  Essential hypertension  Hypothyroidism  Night terrors  Chronic thrombocytopenia  L3 compression fracture  Diabetes mellitus  Elevated troponin noncardiac related; decreased renal clearance    Plan:  Labs and imaging reviewed  Discussed with GI Dr. Xiao, planning scopes with Dr. Peraza today; n.p.o.  Resume at a reduced dose clonidine 0.1 mg every 8 hours  Resume at a reduced dose Coreg 12.5 mg twice a day  Resume Lipitor 40 mg nightly  Resume levothyroxine 175 mcg daily  Continue Protonix 40 mg twice daily  Resume sevelamer 1600 mg 3 times a day  Start gabapentin 100 mg daily  Psychiatry recommendations appreciated; Will start Risperdal at 0.125 mg twice a day, very low-dose and monitor  PT/OT  Nephrology recommendations appreciated  Dialysis per nephrology  A.m. labs  Full code  DVT prophylaxis with SCDs  Clinical course dictate further management  Discussed  with nurse at the bedside    VTE Prophylaxis:  Mechanical VTE prophylaxis orders are present.        CODE STATUS:   Code Status (Patient has no pulse and is not breathing): CPR (Attempt to Resuscitate)  Medical Interventions (Patient has pulse or is breathing): Full Support        Electronically signed by Saúl Vargas MD, 10/28/2024, 10:38 EDT.    Portions of this documentation were transcribed electronically from a voice recognition software.  I confirm all data accurately represents the service(s) I performed at today's visit.

## 2024-10-28 NOTE — ANESTHESIA PREPROCEDURE EVALUATION
Anesthesia Evaluation     Nursing notes reviewed   NPO Solid Status: > 8 hours  NPO Liquid Status: > 2 hours           Airway   Mallampati: II  TM distance: >3 FB  Neck ROM: limited  No difficulty expected  Dental - normal exam     Pulmonary     breath sounds clear to auscultation  (+) ,shortness of breath (With anemia), sleep apnea, decreased breath sounds  Cardiovascular - normal exam    Rhythm: regular  Rate: normal    (+) hypertension, hyperlipidemia    ROS comment: Per Dr. Vargas: Elevated troponin noncardiac related; decreased renal clearance    Neuro/Psych  (+) psychiatric history  GI/Hepatic/Renal/Endo    (+) GERD well controlled, renal disease (Dialyzed at home, due today)- dialysis and ESRD, diabetes mellitus well controlled using insulin, thyroid problem hypothyroidism    Musculoskeletal     (+) back pain  Abdominal    Substance History      OB/GYN          Other        ROS/Med Hx Other: (Not for 10/28/24 procedure Verbal cardiac clearance for Aylin NicoleBECKY on 8/29/24 @1500)     Sinus or ectopic atrial rhythm  Left axis deviation  Probable anterior infarct, age indeterminate  Prolonged QT interval  Date and Time of Study:2024-10-27 04:01:14      ECHO 2/16/24: ·  Left ventricular systolic function is normal. Calculated left ventricular EF = 56.6%  ·  Left ventricular wall thickness is consistent with mild concentric hypertrophy.  ·  Left ventricular diastolic function is consistent with (grade I) impaired relaxation and age.  ·  Aortic valve sclerosis with minimal aortic valve stenosis is present.    5/17/24 stress test: Myocardial perfusion was abnormal demonstrating medium area of severe perfusion defect in the distal inferior/inferoapical segment with minimal reversibility on resting images, more likely related to attenuation artifact.  A small area of mild to moderate perfusion defect was noted in the distal anterior segment with reversibility on resting images which could represent  myocardial ischemia in the right clinical setting.  The left ventricular was normal in size with moderately reduced systolic function.  Calculated LVEF is 36%.  Hypokinesis of the aforementioned segments was noted.  Impressions are consistent with high risk study.  Consider further evaluation if clinically indicated.                     Anesthesia Plan    ASA 3     general   total IV anesthesia  (Total IV Anesthesia    Patient understands anesthesia not responsible for dental damage.    Obta    Will have dialysis after EGD. Hyponatremia baseline. Daughter, Dr. Wang dialyzes pt 5x per week.    Per Nephrology note:     AMS (altered mental status)    Essential hypertension    Type 2 diabetes mellitus without complication    ESRD on dialysis    Abdominal pain    Hallucinations     ESRD: Volume status is adequate. Nurse believes he is TTS patient and had HD yesterday which labs support. Will plan for extra treatment tomorrow to see if that improves his confusion    Anemia: On Mircera protocol at HD unit  Low K- common after dialysis (yesterday).  Should self correct , will use 4 k bath tomorrow.   Hypertension: Blood pressure meds from home look like they are prn, can resume here as needed  per medicine. Will go ahead and add back prn clonidine. Previous   2D echo showed mild LVH with grade 1 diastolic dysfunction.  Hyponatremia: needs 1800 cc p.o. fluid restriction per day.    Confusion- ? Cause, will do extra HD tomorrow   )  intravenous induction     Anesthetic plan, risks, benefits, and alternatives have been provided, discussed and informed consent has been obtained with: patient.    Plan discussed with CRNA.      CODE STATUS:    Code Status (Patient has no pulse and is not breathing): CPR (Attempt to Resuscitate)  Medical Interventions (Patient has pulse or is breathing): Full Support

## 2024-10-28 NOTE — PROGRESS NOTES
[facility]     Nephrology Progress Note      Patient Name: Nelson Wang  : 1945  MRN: [id number]  Primary Care Physician:  Domingo Bravo MD  Date of admission: 10/27/2024    Subjective   Subjective     Interval History:  Patient seen in dialysis.  Events noted.  RRT called for left-sided chest pain after infiltration.  Seems better.  When I saw him he was somnolent, did not try to wake him up.  Hemodynamically stable, blood pressure is on the higher side.  Due to infiltration, treatment terminated.  Plan to resume via TDC.    Review of Systems   Was not obtained due to the above.    Objective   Objective     Vitals:   Temp:  [97 °F (36.1 °C)-98.4 °F (36.9 °C)] 97.8 °F (36.6 °C)  Heart Rate:  [58-76] 64  Resp:  [14-22] 16  BP: (101-184)/(47-85) 166/75  Flow (L/min) (Oxygen Therapy):  [2] 2  Physical Exam:   Constitutional: Reportedly confused, when I saw him sleeping and resting comfortably.   Eyes: sclerae anicteric, no conjunctival injection   HENT: mucous membranes moist   Neck: Supple, no thyromegaly, no lymphadenopathy, trachea midline, No JVD   Respiratory: Clear to auscultation bilaterally, nonlabored respirations    Cardiovascular: RRR, no murmurs, rubs, or gallops.   Gastrointestinal: Positive bowel sounds, soft, nontender, nondistended   Musculoskeletal: No edema, no clubbing or cyanosis   Psychiatric:    Neurologic: Moving extremities.   Skin: warm and dry, no rashes, vitiligo.   Left upper extremity AV fistula is patent.    Result Review    Result Reviewed:  I have personally reviewed the results from the time of this admission to 10/28/2024 17:25 EDT and agree with these findings:  [x]  Laboratory  []  Microbiology  [x]  Radiology  []  EKG/Telemetry   []  Cardiology/Vascular   []  Pathology  [x]  Old records  []  Other:        Lab 10/28/24  0433 10/27/24  0418   SODIUM 127* 130*   POTASSIUM 3.7 3.2*   CHLORIDE 87* 87*   CO2 28.9 31.3*   BUN 37* 29*   CREATININE 6.14* 4.96*   GLUCOSE  132* 155*   EGFR 8.7* 11.3*   ANION GAP 11.1 11.7   MAGNESIUM 1.8 1.8   PHOSPHORUS 4.1  --            Most notable findings include: As above.    Assessment & Plan   Assessment / Plan       Active Hospital Problems:  Active Hospital Problems    Diagnosis     **AMS (altered mental status)     Hallucinations     LUQ pain     Abdominal pain     ESRD on dialysis     Essential hypertension     Type 2 diabetes mellitus without complication        Assessment and Plan:    1.  ESRD: Volume status is adequate, blood pressure on the higher side.  Infiltrated 1 hour into treatment.  Will finish 2 more hours of dialysis via TDC.  Patient was on home hemodialysis receiving treatment 5 days a week.  Has left upper extremity AV fistula that is in the process of being used, still with right IJ TDC.     2.  Anemia: On Mircera protocol at HD unit.     3.  Low K- common after dialysis (yesterday).  Improved, monitor.  Check magnesium.      4.  Abdominal pain     5.  Hypertension: Blood pressure is on the higher side.  Will reevaluate tomorrow.  UF 1.5 kg with dialysis.  Medications are being adjusted.  2D echo showed mild LVH with grade 1 diastolic dysfunction.     6.  History of hypothyroidism on Synthroid, per primary.     7.  Hyponatremia: needs 1800 cc p.o. fluid restriction per day.  Will also try to corrected with dialysis.     8.  Hyperphosphatemia: Continue phosphate binder and low phosphorus diet.     9.  Confusion- ? Cause?.  Last week I prescribed amitriptyline 5 mg at night but I am unsure if the medication was started. Will see if any improvement after dialysis.    Discussed with dialysis staff.  Will follow.  Please call with any questions.      Electronically signed by Coreen Castro MD, 10/28/2024, 17:25 EDT.

## 2024-10-28 NOTE — NURSING NOTE
Duration of Treatment 3.5 Hours                                 2 hr 21 min   Access Site Tunneled Dialysis Catheter   Dialyzer Revaclear    mL/min   Dialysate Temperature (C) 36   BFR-As tolerated to a maximum of: 400 mL/min   Dialysate Solution Bath: K+ = 4 mEq, Ca = 2.5mEq   Bicarb 30 meq                                    35   Na+ 137 meq   Fluid Removal: 1.0 Liter                                  1000 ml     Pt came to dialysis unit and started treatment.  AVF was accessed with no issues.  Approximately 1550 the machine started alarming and pt complained of pain in L AVF.  Venous access had slightly infiltrated but was swollen and painful for the patient.  Venous access obtained again and also infiltrated so needles were removed from arterial and venous, blood was not returned to pt. During that time pt started complaining of severe pain and grabbed his chest.  RRT was called.  EKG obtained.  Attending doctor notified by RRT nurse.  Pt was reassessed and pain had subsided.  Dr. Castro notified and came to HD unit and at that time gave verbal order to use TDC and changed order for bicarb to 35 and to run pt for 2 more hours.  Cartridge changed and dialysis restarted with new orders.  No issues with TDC.  Hep locked, capped, and dressing changed and lumens covered with gauze.  Pt was confused during time on dialysis unit. Last /71.  Report given to HIREN Sloan at 0923.

## 2024-10-28 NOTE — PLAN OF CARE
Goal Outcome Evaluation:              Outcome Evaluation: pt is a&o x1-2, pt is alert to self and sometimes knows where he is, vss, pt is on 2L nc due to patient having sleep apnea. pt has HD tuesday, thursday, and saturday, however pt did do HD today, pt has a bed alarm in place, pt may try to get up without assistance.

## 2024-10-28 NOTE — PLAN OF CARE
Goal Outcome Evaluation:            Patient resting well this shift. Looking to do dialysis today. Planning for an EGD with Dr. Xiao today after discussion is done with family about procedure. Patient walks around at home with a cane. Alert to self and place only. Has been NPO since midnight. Vitals stable. Will continue plan of care.

## 2024-10-29 LAB
ABO GROUP BLD: NORMAL
ABO GROUP BLD: NORMAL
ALBUMIN SERPL-MCNC: 2.9 G/DL (ref 3.5–5.2)
ALP SERPL-CCNC: 65 U/L (ref 39–117)
ALT SERPL W P-5'-P-CCNC: <5 U/L (ref 1–41)
ANION GAP SERPL CALCULATED.3IONS-SCNC: 8.7 MMOL/L (ref 5–15)
AST SERPL-CCNC: 8 U/L (ref 1–40)
BASOPHILS # BLD AUTO: 0.03 10*3/MM3 (ref 0–0.2)
BASOPHILS NFR BLD AUTO: 0.5 % (ref 0–1.5)
BILIRUB CONJ SERPL-MCNC: 0.4 MG/DL (ref 0–0.3)
BILIRUB INDIRECT SERPL-MCNC: 0.3 MG/DL
BILIRUB SERPL-MCNC: 0.7 MG/DL (ref 0–1.2)
BLD GP AB SCN SERPL QL: NEGATIVE
BUN SERPL-MCNC: 25 MG/DL (ref 8–23)
BUN/CREAT SERPL: 5.7 (ref 7–25)
CALCIUM SPEC-SCNC: 8.4 MG/DL (ref 8.6–10.5)
CHLORIDE SERPL-SCNC: 94 MMOL/L (ref 98–107)
CO2 SERPL-SCNC: 27.3 MMOL/L (ref 22–29)
CREAT SERPL-MCNC: 4.4 MG/DL (ref 0.76–1.27)
DEPRECATED RDW RBC AUTO: 55.2 FL (ref 37–54)
EGFRCR SERPLBLD CKD-EPI 2021: 13 ML/MIN/1.73
EOSINOPHIL # BLD AUTO: 0.03 10*3/MM3 (ref 0–0.4)
EOSINOPHIL NFR BLD AUTO: 0.5 % (ref 0.3–6.2)
ERYTHROCYTE [DISTWIDTH] IN BLOOD BY AUTOMATED COUNT: 16.1 % (ref 12.3–15.4)
GLUCOSE SERPL-MCNC: 209 MG/DL (ref 65–99)
HBV SURFACE AB SER RIA-ACNC: REACTIVE
HBV SURFACE AG SERPL QL IA: NORMAL
HCT VFR BLD AUTO: 22.3 % (ref 37.5–51)
HCT VFR BLD AUTO: 27 % (ref 37.5–51)
HGB BLD-MCNC: 6.8 G/DL (ref 13–17.7)
HGB BLD-MCNC: 8.2 G/DL (ref 13–17.7)
IMM GRANULOCYTES # BLD AUTO: 0.04 10*3/MM3 (ref 0–0.05)
IMM GRANULOCYTES NFR BLD AUTO: 0.6 % (ref 0–0.5)
LYMPHOCYTES # BLD AUTO: 1.01 10*3/MM3 (ref 0.7–3.1)
LYMPHOCYTES NFR BLD AUTO: 15.9 % (ref 19.6–45.3)
MAGNESIUM SERPL-MCNC: 1.9 MG/DL (ref 1.6–2.4)
MCH RBC QN AUTO: 28.7 PG (ref 26.6–33)
MCHC RBC AUTO-ENTMCNC: 30.5 G/DL (ref 31.5–35.7)
MCV RBC AUTO: 94.1 FL (ref 79–97)
MONOCYTES # BLD AUTO: 0.75 10*3/MM3 (ref 0.1–0.9)
MONOCYTES NFR BLD AUTO: 11.8 % (ref 5–12)
NEUTROPHILS NFR BLD AUTO: 4.5 10*3/MM3 (ref 1.7–7)
NEUTROPHILS NFR BLD AUTO: 70.7 % (ref 42.7–76)
NRBC BLD AUTO-RTO: 0 /100 WBC (ref 0–0.2)
PHOSPHATE SERPL-MCNC: 3.4 MG/DL (ref 2.5–4.5)
PLATELET # BLD AUTO: 197 10*3/MM3 (ref 140–450)
PMV BLD AUTO: 9.9 FL (ref 6–12)
POTASSIUM SERPL-SCNC: 4.1 MMOL/L (ref 3.5–5.2)
PROT SERPL-MCNC: 6.2 G/DL (ref 6–8.5)
QT INTERVAL: 434 MS
QT INTERVAL: 487 MS
QT INTERVAL: 493 MS
QT INTERVAL: 499 MS
QTC INTERVAL: 476 MS
QTC INTERVAL: 496 MS
QTC INTERVAL: 503 MS
QTC INTERVAL: 511 MS
RBC # BLD AUTO: 2.37 10*6/MM3 (ref 4.14–5.8)
RH BLD: POSITIVE
RH BLD: POSITIVE
SODIUM SERPL-SCNC: 130 MMOL/L (ref 136–145)
T&S EXPIRATION DATE: NORMAL
WBC NRBC COR # BLD AUTO: 6.36 10*3/MM3 (ref 3.4–10.8)

## 2024-10-29 PROCEDURE — 93010 ELECTROCARDIOGRAM REPORT: CPT | Performed by: INTERNAL MEDICINE

## 2024-10-29 PROCEDURE — 86901 BLOOD TYPING SEROLOGIC RH(D): CPT | Performed by: STUDENT IN AN ORGANIZED HEALTH CARE EDUCATION/TRAINING PROGRAM

## 2024-10-29 PROCEDURE — 36430 TRANSFUSION BLD/BLD COMPNT: CPT

## 2024-10-29 PROCEDURE — 87340 HEPATITIS B SURFACE AG IA: CPT | Performed by: INTERNAL MEDICINE

## 2024-10-29 PROCEDURE — P9016 RBC LEUKOCYTES REDUCED: HCPCS

## 2024-10-29 PROCEDURE — 80048 BASIC METABOLIC PNL TOTAL CA: CPT | Performed by: INTERNAL MEDICINE

## 2024-10-29 PROCEDURE — 86704 HEP B CORE ANTIBODY TOTAL: CPT | Performed by: INTERNAL MEDICINE

## 2024-10-29 PROCEDURE — 86850 RBC ANTIBODY SCREEN: CPT | Performed by: STUDENT IN AN ORGANIZED HEALTH CARE EDUCATION/TRAINING PROGRAM

## 2024-10-29 PROCEDURE — 5A1D70Z PERFORMANCE OF URINARY FILTRATION, INTERMITTENT, LESS THAN 6 HOURS PER DAY: ICD-10-PCS | Performed by: INTERNAL MEDICINE

## 2024-10-29 PROCEDURE — 85025 COMPLETE CBC W/AUTO DIFF WBC: CPT | Performed by: INTERNAL MEDICINE

## 2024-10-29 PROCEDURE — 86901 BLOOD TYPING SEROLOGIC RH(D): CPT

## 2024-10-29 PROCEDURE — 86706 HEP B SURFACE ANTIBODY: CPT | Performed by: INTERNAL MEDICINE

## 2024-10-29 PROCEDURE — 86900 BLOOD TYPING SEROLOGIC ABO: CPT

## 2024-10-29 PROCEDURE — 80076 HEPATIC FUNCTION PANEL: CPT | Performed by: INTERNAL MEDICINE

## 2024-10-29 PROCEDURE — 85018 HEMOGLOBIN: CPT | Performed by: FAMILY MEDICINE

## 2024-10-29 PROCEDURE — 86923 COMPATIBILITY TEST ELECTRIC: CPT

## 2024-10-29 PROCEDURE — 93005 ELECTROCARDIOGRAM TRACING: CPT | Performed by: FAMILY MEDICINE

## 2024-10-29 PROCEDURE — 84100 ASSAY OF PHOSPHORUS: CPT | Performed by: INTERNAL MEDICINE

## 2024-10-29 PROCEDURE — 86900 BLOOD TYPING SEROLOGIC ABO: CPT | Performed by: STUDENT IN AN ORGANIZED HEALTH CARE EDUCATION/TRAINING PROGRAM

## 2024-10-29 PROCEDURE — 99233 SBSQ HOSP IP/OBS HIGH 50: CPT | Performed by: FAMILY MEDICINE

## 2024-10-29 PROCEDURE — 85014 HEMATOCRIT: CPT | Performed by: FAMILY MEDICINE

## 2024-10-29 PROCEDURE — 83735 ASSAY OF MAGNESIUM: CPT | Performed by: INTERNAL MEDICINE

## 2024-10-29 RX ORDER — PREGABALIN 25 MG/1
25 CAPSULE ORAL DAILY
Status: DISCONTINUED | OUTPATIENT
Start: 2024-10-29 | End: 2024-11-01 | Stop reason: HOSPADM

## 2024-10-29 RX ORDER — LIDOCAINE 4 G/G
3 PATCH TOPICAL
Status: DISCONTINUED | OUTPATIENT
Start: 2024-10-29 | End: 2024-11-01 | Stop reason: HOSPADM

## 2024-10-29 RX ORDER — FENTANYL 12.5 UG/1
1 PATCH TRANSDERMAL
Status: DISCONTINUED | OUTPATIENT
Start: 2024-10-29 | End: 2024-10-30

## 2024-10-29 RX ADMIN — RISPERIDONE 0.12 MG: 0.25 TABLET ORAL at 20:09

## 2024-10-29 RX ADMIN — FENTANYL 1 PATCH: 12 PATCH TRANSDERMAL at 13:00

## 2024-10-29 RX ADMIN — SEVELAMER CARBONATE 1600 MG: 800 TABLET, FILM COATED ORAL at 08:51

## 2024-10-29 RX ADMIN — SENNOSIDES AND DOCUSATE SODIUM 2 TABLET: 50; 8.6 TABLET ORAL at 20:08

## 2024-10-29 RX ADMIN — ACETAMINOPHEN 650 MG: 325 TABLET ORAL at 04:29

## 2024-10-29 RX ADMIN — CARVEDILOL 12.5 MG: 12.5 TABLET, FILM COATED ORAL at 08:51

## 2024-10-29 RX ADMIN — LIDOCAINE 3 PATCH: 4 PATCH TOPICAL at 08:52

## 2024-10-29 RX ADMIN — CARVEDILOL 12.5 MG: 12.5 TABLET, FILM COATED ORAL at 20:08

## 2024-10-29 RX ADMIN — ACETAMINOPHEN 650 MG: 325 TABLET ORAL at 20:08

## 2024-10-29 RX ADMIN — PANTOPRAZOLE SODIUM 40 MG: 40 TABLET, DELAYED RELEASE ORAL at 08:52

## 2024-10-29 RX ADMIN — LEVOTHYROXINE SODIUM 175 MCG: 75 TABLET ORAL at 06:23

## 2024-10-29 RX ADMIN — ATORVASTATIN CALCIUM 40 MG: 40 TABLET, FILM COATED ORAL at 20:08

## 2024-10-29 RX ADMIN — PANTOPRAZOLE SODIUM 40 MG: 40 TABLET, DELAYED RELEASE ORAL at 20:08

## 2024-10-29 RX ADMIN — Medication 10 ML: at 20:08

## 2024-10-29 RX ADMIN — PREGABALIN 25 MG: 25 CAPSULE ORAL at 08:51

## 2024-10-29 NOTE — SIGNIFICANT NOTE
10/29/24 0806   OTHER   Discipline occupational therapist   Rehab Time/Intention   Session Not Performed other (see comments)  (Hold, 6.8 hgb)

## 2024-10-29 NOTE — PROGRESS NOTES
[facility]   Hospitalist Progress Note  Date: 10/29/2024  Patient Name: Nelson Wang  : 1945  MRN: [id number]  Date of admission: 10/27/2024      Subjective   Subjective     Chief complaint: Altered mental status, hallucinations    Summary:  79-year-old male with ESRD on home dialysis, dyslipidemia, hypertension, hypothyroidism, history of night terrors, chronic thrombocytopenia, anxiety, depression, recent history of L3 compression fracture, with degenerative changes through the lumbar spine, diabetes, history of alcohol use in excess, ex-smoker, hospitalized on 10/27/2024 for chief complaint of altered mental status and hallucinations, prior to hospitalization was having several medication changes to determine the best medications to manage symptoms.  Held several medications, nephrology consulted, patient has elevated troponin which is chronic and related to renal disease.  Degree of troponin elevation with negative delta is not indicative of cardiac issue.  Ongoing abdominal pain complaints, GI consulted, planning EGD and scope.  Psychiatry consulted, recommending risperidone, not initiated given prolonged QT.  Trial of 0.125 mg performed, did not respond.  Ongoing pain control, difficulty managing, added fentanyl patch and Lyrica.  Status post EGD, 10/28/2024, found to have gastritis.    Interval follow-up: Patient seen and examined this morning, no acute distress, no acute major overnight events, no major events on telemetry, no chest pain symptoms, continues to have hallucinations, difficulty with pain control primarily in his upper back, hemoglobin down to 6.8, does question whether or not the patient's fistula was infiltrated.  Overnight became combative and agitated towards staff, safety sitter at bedside, family had to reorient him.  1 unit PRBC ordered for transfusion.  Posttransfusion hemoglobin jumped to 8.2.  Creatinine 4.4, BUN 25 potassium 4.1, sodium 130.  QTc down to 476 today. Will  retry Risperdal tonight and monitor QTc.    Review of systems:  All systems reviewed and negative except for weakness, fatigue, back pain, confusion, hallucinations      Objective   Objective     Vitals:   Temp:  [97 °F (36.1 °C)-98.2 °F (36.8 °C)] 98.1 °F (36.7 °C)  Heart Rate:  [58-74] 65  Resp:  [14-18] 18  BP: (101-176)/(47-96) 119/54  Flow (L/min) (Oxygen Therapy):  [2] 2  Physical Exam   Constitutional: Awake, alert, disoriented but pleasantly confused, speaking in native language intermittently  Eyes: Pupils equal, sclerae anicteric, no conjunctival injection  HENT: NCAT, mucous membranes moist  Neck: Supple, full range of motion  Respiratory: Diminished coarse breath sounds with no rhonchi or wheezing, no crackles  Cardiovascular: RRR, no murmurs, rubs, or gallops, palpable pedal pulses bilaterally  Gastrointestinal: Positive bowel sounds, soft, nontender, distended  Musculoskeletal: No bilateral ankle edema, no clubbing or cyanosis to extremities  Psychiatric: Calm and cooperative  Neurologic: Disoriented but alert and awake and pleasantly confused, strength symmetric in all extremities, Cranial Nerves grossly intact to confrontation, speech clear  Skin: No rashes visible on exposed skin     Result Review    Result Review:  I have personally reviewed the pertinent results from the past 24 hours to 10/29/2024 12:34 EDT and agree with these findings:  [x]  Laboratory   CBC          10/27/2024    04:18 10/28/2024    04:33 10/29/2024    03:04 10/29/2024    11:37   CBC   WBC 7.70  6.18  6.36     RBC 2.75  2.66  2.37     Hemoglobin 8.0  7.4  6.8  8.2    Hematocrit 26.0  24.6  22.3  27.0    MCV 94.5  92.5  94.1     MCH 29.1  27.8  28.7     MCHC 30.8  30.1  30.5     RDW 16.5  15.9  16.1     Platelets 211  219  197       BMP          10/27/2024    04:18 10/28/2024    04:33 10/29/2024    03:04   BMP   BUN 29  37  25    Creatinine 4.96  6.14  4.40    Sodium 130  127  130    Potassium 3.2  3.7  4.1    Chloride 87  87   94    CO2 31.3  28.9  27.3    Calcium 9.1  9.0  8.4      LIVER FUNCTION TESTS:      Lab 10/29/24  0304 10/28/24  0433 10/27/24  0418   TOTAL PROTEIN 6.2 6.9 7.4   ALBUMIN 2.9* 3.2* 3.5   GLOBULIN  --   --  3.9   ALT (SGPT) <5 5 5   AST (SGOT) 8 9 10   BILIRUBIN 0.7 0.9 1.0   INDIRECT BILIRUBIN 0.3 0.5  --    BILIRUBIN DIRECT 0.4* 0.4*  --    ALK PHOS 65 71 78       [x]  Microbiology   Microbiology Results (last 10 days)       ** No results found for the last 240 hours. **              [x]  Radiology XR Shoulder 2+ View Left    Result Date: 10/28/2024  Mild AC joint arthropathy. No acute findings in the shoulder. Electronically Signed: Cristian Tiwari MD  10/28/2024 10:08 PM EDT  Workstation ID: [id number]    XR Chest 1 View    Result Date: 10/27/2024  Stable cardiac enlargement with mild vascular congestion. Electronically Signed: Cristian Tiwari MD  10/27/2024 4:12 AM EDT  Workstation ID: [id number]       [x]  EKG/Telemetry   ECG 12 Lead QT Measurement   Preliminary Result   HEART RATE=72  bpm   RR Ncplhyub=202  ms   WA Luyxrucd=836  ms   P Horizontal Axis=-3  deg   P Front Axis=41  deg   QRSD Interval=90  ms   QT Awvhonsc=615  ms   GSlQ=682  ms   QRS Axis=-32  deg   T Wave Axis=3  deg   - ABNORMAL ECG -   Sinus rhythm   Borderline prolonged WA interval   Abnormal R-wave progression, late transition   Inferior infarct, old   Date and Time of Study:2024-10-29 07:56:03      ECG 12 Lead QT Measurement   Preliminary Result   HEART RATE=63  bpm   RR Jpwjqfxr=894  ms   WA Lkbukaqu=707  ms   P Horizontal Axis=16  deg   P Front Axis=24  deg   QRSD Interval=93  ms   QT Nvvcltgv=049  ms   ZLuS=065  ms   QRS Axis=-21  deg   T Wave Axis=-4  deg   - ABNORMAL ECG -   Serial comparison ignored immediately previous ECG(s) - Too recent   Sinus rhythm   Borderline  left axis deviation   Abnormal R-wave progression, late transition   Borderline  T abnormalities, inferior leads   Prolonged QT interval   When compared with ECG of  28-Oct-2024 12:17:38,   No significant change   Date and Time of Study:2024-10-28 16:03:12      ECG 12 Lead Chest Pain   Preliminary Result   HEART RATE=63  bpm   RR Fssxosul=348  ms   NV Interval=  ms   P Horizontal Axis=-7  deg   P Front Axis=0  deg   QRSD Interval=93  ms   QT Btgkkqcc=570  ms   GUjA=181  ms   QRS Axis=-27  deg   T Wave Axis=1  deg   - ABNORMAL ECG -   AV block, complete (third degree)   Abnormal R-wave progression, late transition   Probable inferior infarct, age indeterminate   Prolonged QT interval   Date and Time of Study:2024-10-28 16:01:57      ECG 12 Lead QT Measurement   Preliminary Result   HEART RATE=62  bpm   RR Nnzoizzs=966  ms   NV Zbxnvafy=716  ms   P Horizontal Axis=  deg   P Front Axis=35  deg   QRSD Interval=94  ms   QT Tppsuncj=193  ms   ODiZ=131  ms   QRS Axis=-24  deg   T Wave Axis=-5  deg   - ABNORMAL ECG -   Sinus rhythm   Borderline left axis deviation   Anteroseptal infarct, age indeterminate   Date and Time of Study:2024-10-28 12:17:38      ECG 12 Lead ED Triage Standing Order; Weak / Dizzy / AMS   Preliminary Result   HEART RATE=77  bpm   RR Gghxweok=727  ms   NV Gsuqvpwi=961  ms   P Horizontal Axis=10  deg   P Front Axis=-57  deg   QRSD Interval=92  ms   QT Sjbaykqc=912  ms   ATjM=402  ms   QRS Axis=-30  deg   T Wave Axis=34  deg   - ABNORMAL ECG -   Sinus or ectopic atrial rhythm   Left axis deviation   Probable anterior infarct, age indeterminate   Prolonged QT interval   Date and Time of Study:2024-10-27 04:01:14          []  Cardiology/Vascular   []  Pathology  [x]  Old records  []  Other:    Assessment & Plan   Assessment / Plan     Assessment/Plan:  Assessment:  Hallucinations  Abdominal pain  Anxiety  Depression  Prolonged QTc  ESRD on HD  Dyslipidemia  Essential hypertension  Hypothyroidism  Night terrors  Chronic thrombocytopenia  L3 compression fracture  Diabetes mellitus  Elevated troponin noncardiac related; decreased renal clearance    Plan:  Labs and  imaging reviewed  Revised pain control, discontinue Neurontin  Start very low-dose Lyrica 25 mg daily  Start lidocaine patch 12 mcg every 72 hours  Notify MD if patient has hemodynamic instability, remove fentanyl patch  Lidocaine patch for local pain control  1 unit PRBC transfusion  GI recommendations appreciated  Continue at a reduced dose clonidine 0.1 mg every 8 hours  Continue at a reduced dose Coreg 12.5 mg twice a day  Continue Lipitor 40 mg nightly  Continue levothyroxine 175 mcg daily  Continue Protonix 40 mg twice daily  Continue sevelamer 1600 mg 3 times a day  Psychiatry recommendations appreciated; Will retry Risperdal at 0.125 mg twice a day, very low-dose and monitor, will start tonight  Monitor routine EKGs for QTc measurement  PT/OT  Nephrology recommendations appreciated  Dialysis per nephrology  A.m. labs  Full code  DVT prophylaxis with SCDs  Clinical course dictate further management  Discussed with nurse at the bedside  VTE Prophylaxis:  Pharmacologic & mechanical VTE prophylaxis orders are present.        CODE STATUS:   Code Status (Patient has no pulse and is not breathing): CPR (Attempt to Resuscitate)  Medical Interventions (Patient has pulse or is breathing): Full Support        Electronically signed by Saúl Vargas MD, 10/29/2024, 12:34 EDT.    Portions of this documentation were transcribed electronically from a voice recognition software.  I confirm all data accurately represents the service(s) I performed at today's visit.

## 2024-10-29 NOTE — PLAN OF CARE
Goal Outcome Evaluation:  Plan of Care Reviewed With: patient        Progress: no change  Outcome Evaluation: patient alert and oriented to self and time, but disoriented to place and situation throughout shift, confusion increased overnight. On room air, 2L NC as needed for sleep apnea. Sinus rhythm on monitor. Complaints of back, L shoulder, and rib pain during shift, see mar. patient's daughter contacted and came to help reorient and keep patient calm for some of the night. patient woke up and became agitated, pulling telemetry off, attempting to pull at IV access and tunneled catheter, and physically combative with staff. Having consistent visual hallucinations. Hospitalist notified and sitter at bedside ordered, safety sitter at bedside. bed alarm in place. PRBCs currently infusing per order. frequent rounding ongoing.

## 2024-10-29 NOTE — PLAN OF CARE
Goal Outcome Evaluation:              Outcome Evaluation: patient currently alert to self. safety watch at bedside. attempts out of bed and pulling on lines or equipment. call light in reach. patient has refused medications and meals this shift. lidocaine and fentanyl patches applied, after patient expressed some relief of pain. no signs of acute distress noted. 1 unit of blood completed.

## 2024-10-29 NOTE — PROGRESS NOTES
[facility]     Nephrology Progress Note      Patient Name: Nelson Wang  : 1945  MRN: [id number]  Primary Care Physician:  Domingo Bravo MD  Date of admission: 10/27/2024    Subjective   Subjective     Interval History:  When I saw him he was somnolent, did not try to wake him up.  Hemodynamically stable, blood pressure is on the higher side.  Had trouble with fistula infiltration on dialysis yesterday.  Using TDC now.    Review of Systems   Was not obtained due to the above.    Objective   Objective     Vitals:   Temp:  [97 °F (36.1 °C)-98.2 °F (36.8 °C)] 98.1 °F (36.7 °C)  Heart Rate:  [58-74] 72  Resp:  [14-18] 18  BP: (101-176)/(47-96) 157/56  Flow (L/min) (Oxygen Therapy):  [2] 2  Physical Exam:   Constitutional: Reportedly confused, when I saw him sleeping and resting comfortably.   Eyes: sclerae anicteric, no conjunctival injection   HENT: mucous membranes moist   Neck: Supple, no thyromegaly, no lymphadenopathy, trachea midline, No JVD   Respiratory: Clear to auscultation bilaterally, nonlabored respirations    Cardiovascular: RRR, no murmurs, rubs, or gallops.   Gastrointestinal: Positive bowel sounds, soft, nontender, nondistended   Musculoskeletal: No edema, no clubbing or cyanosis   Psychiatric:    Neurologic: Moving extremities.   Skin: warm and dry, no rashes, vitiligo.   Left upper extremity AV fistula is patent.    Result Review    Result Reviewed:  I have personally reviewed the results from the time of this admission to 10/29/2024 07:59 EDT and agree with these findings:  [x]  Laboratory  []  Microbiology  [x]  Radiology  []  EKG/Telemetry   []  Cardiology/Vascular   []  Pathology  [x]  Old records  []  Other:        Lab 10/29/24  0304 10/28/24  0433 10/27/24  0418   SODIUM 130* 127* 130*   POTASSIUM 4.1 3.7 3.2*   CHLORIDE 94* 87* 87*   CO2 27.3 28.9 31.3*   BUN 25* 37* 29*   CREATININE 4.40* 6.14* 4.96*   GLUCOSE 209* 132* 155*   EGFR 13.0* 8.7* 11.3*   ANION GAP 8.7 11.1 11.7    MAGNESIUM 1.9 1.8 1.8   PHOSPHORUS 3.4 4.1  --            Assessment & Plan   Assessment / Plan       Active Hospital Problems:  Active Hospital Problems    Diagnosis     **AMS (altered mental status)     Hallucinations     LUQ pain     Abdominal pain     ESRD on dialysis     Essential hypertension     Type 2 diabetes mellitus without complication        Assessment and Plan:    1.  ESRD:  Patient was on home hemodialysis receiving treatment 5 days a week.  Has left upper extremity AV fistula that is in the process of being used, still with right IJ TDC.  Continue m/w/f in hospital.     2.  Anemia: On Mircera protocol at HD unit.     3.  Low K- common after dialysis (yesterday).  Improved, monitor.  Check magnesium.      4.  Abdominal pain     5.  Hypertension: Blood pressure is on the higher side.  Will reevaluate tomorrow.  UF 1.5 kg with dialysis.  Medications are being adjusted.  2D echo showed mild LVH with grade 1 diastolic dysfunction.     6.  History of hypothyroidism on Synthroid, per primary.     7.  Hyponatremia: needs 1800 cc p.o. fluid restriction per day.       8.  Hyperphosphatemia: Continue phosphate binder and low phosphorus diet.     9.  Confusion- appears improved now.

## 2024-10-30 ENCOUNTER — APPOINTMENT (OUTPATIENT)
Dept: NEPHROLOGY | Facility: HOSPITAL | Age: 79
End: 2024-10-30
Payer: MEDICARE

## 2024-10-30 LAB
ALBUMIN SERPL-MCNC: 3.3 G/DL (ref 3.5–5.2)
ALP SERPL-CCNC: 69 U/L (ref 39–117)
ALT SERPL W P-5'-P-CCNC: <5 U/L (ref 1–41)
ANION GAP SERPL CALCULATED.3IONS-SCNC: 11.4 MMOL/L (ref 5–15)
AST SERPL-CCNC: 8 U/L (ref 1–40)
BASOPHILS # BLD AUTO: 0.02 10*3/MM3 (ref 0–0.2)
BASOPHILS NFR BLD AUTO: 0.3 % (ref 0–1.5)
BH BB BLOOD EXPIRATION DATE: NORMAL
BH BB BLOOD TYPE BARCODE: 8400
BH BB DISPENSE STATUS: NORMAL
BH BB PRODUCT CODE: NORMAL
BH BB UNIT NUMBER: NORMAL
BILIRUB CONJ SERPL-MCNC: 0.5 MG/DL (ref 0–0.3)
BILIRUB INDIRECT SERPL-MCNC: 0.4 MG/DL
BILIRUB SERPL-MCNC: 0.9 MG/DL (ref 0–1.2)
BUN SERPL-MCNC: 34 MG/DL (ref 8–23)
BUN/CREAT SERPL: 5.8 (ref 7–25)
CALCIUM SPEC-SCNC: 8.8 MG/DL (ref 8.6–10.5)
CHLORIDE SERPL-SCNC: 92 MMOL/L (ref 98–107)
CO2 SERPL-SCNC: 26.6 MMOL/L (ref 22–29)
CREAT SERPL-MCNC: 5.82 MG/DL (ref 0.76–1.27)
CROSSMATCH INTERPRETATION: NORMAL
CYTO UR: NORMAL
DEPRECATED RDW RBC AUTO: 54 FL (ref 37–54)
EGFRCR SERPLBLD CKD-EPI 2021: 9.3 ML/MIN/1.73
EOSINOPHIL # BLD AUTO: 0.02 10*3/MM3 (ref 0–0.4)
EOSINOPHIL NFR BLD AUTO: 0.3 % (ref 0.3–6.2)
ERYTHROCYTE [DISTWIDTH] IN BLOOD BY AUTOMATED COUNT: 15.9 % (ref 12.3–15.4)
GLUCOSE SERPL-MCNC: 159 MG/DL (ref 65–99)
HBV CORE AB SERPL QL IA: NEGATIVE
HCT VFR BLD AUTO: 26.2 % (ref 37.5–51)
HGB BLD-MCNC: 8 G/DL (ref 13–17.7)
IMM GRANULOCYTES # BLD AUTO: 0.02 10*3/MM3 (ref 0–0.05)
IMM GRANULOCYTES NFR BLD AUTO: 0.3 % (ref 0–0.5)
LAB AP CASE REPORT: NORMAL
LAB AP CLINICAL INFORMATION: NORMAL
LAB AP SPECIAL STAINS: NORMAL
LYMPHOCYTES # BLD AUTO: 1.43 10*3/MM3 (ref 0.7–3.1)
LYMPHOCYTES NFR BLD AUTO: 21.9 % (ref 19.6–45.3)
MAGNESIUM SERPL-MCNC: 1.9 MG/DL (ref 1.6–2.4)
MCH RBC QN AUTO: 28.3 PG (ref 26.6–33)
MCHC RBC AUTO-ENTMCNC: 30.5 G/DL (ref 31.5–35.7)
MCV RBC AUTO: 92.6 FL (ref 79–97)
MONOCYTES # BLD AUTO: 0.66 10*3/MM3 (ref 0.1–0.9)
MONOCYTES NFR BLD AUTO: 10.1 % (ref 5–12)
NEUTROPHILS NFR BLD AUTO: 4.37 10*3/MM3 (ref 1.7–7)
NEUTROPHILS NFR BLD AUTO: 67.1 % (ref 42.7–76)
NRBC BLD AUTO-RTO: 0 /100 WBC (ref 0–0.2)
PATH REPORT.FINAL DX SPEC: NORMAL
PATH REPORT.GROSS SPEC: NORMAL
PHOSPHATE SERPL-MCNC: 3.5 MG/DL (ref 2.5–4.5)
PLATELET # BLD AUTO: 191 10*3/MM3 (ref 140–450)
PMV BLD AUTO: 10 FL (ref 6–12)
POTASSIUM SERPL-SCNC: 4.3 MMOL/L (ref 3.5–5.2)
PROT SERPL-MCNC: 6.8 G/DL (ref 6–8.5)
QT INTERVAL: 330 MS
QT INTERVAL: 397 MS
QT INTERVAL: 439 MS
QTC INTERVAL: 437 MS
QTC INTERVAL: 439 MS
QTC INTERVAL: 460 MS
RBC # BLD AUTO: 2.83 10*6/MM3 (ref 4.14–5.8)
SODIUM SERPL-SCNC: 130 MMOL/L (ref 136–145)
UNIT  ABO: NORMAL
UNIT  RH: NORMAL
WBC NRBC COR # BLD AUTO: 6.52 10*3/MM3 (ref 3.4–10.8)

## 2024-10-30 PROCEDURE — 84145 PROCALCITONIN (PCT): CPT | Performed by: PHYSICIAN ASSISTANT

## 2024-10-30 PROCEDURE — 25010000002 HYDROMORPHONE 1 MG/ML SOLUTION: Performed by: FAMILY MEDICINE

## 2024-10-30 PROCEDURE — 99233 SBSQ HOSP IP/OBS HIGH 50: CPT | Performed by: FAMILY MEDICINE

## 2024-10-30 PROCEDURE — 80076 HEPATIC FUNCTION PANEL: CPT | Performed by: FAMILY MEDICINE

## 2024-10-30 PROCEDURE — 80048 BASIC METABOLIC PNL TOTAL CA: CPT | Performed by: FAMILY MEDICINE

## 2024-10-30 PROCEDURE — 83735 ASSAY OF MAGNESIUM: CPT | Performed by: FAMILY MEDICINE

## 2024-10-30 PROCEDURE — 85025 COMPLETE CBC W/AUTO DIFF WBC: CPT | Performed by: FAMILY MEDICINE

## 2024-10-30 PROCEDURE — 25010000002 HEPARIN (PORCINE) PER 1000 UNITS: Performed by: INTERNAL MEDICINE

## 2024-10-30 PROCEDURE — 97165 OT EVAL LOW COMPLEX 30 MIN: CPT

## 2024-10-30 PROCEDURE — 84100 ASSAY OF PHOSPHORUS: CPT | Performed by: FAMILY MEDICINE

## 2024-10-30 PROCEDURE — 93005 ELECTROCARDIOGRAM TRACING: CPT | Performed by: FAMILY MEDICINE

## 2024-10-30 RX ORDER — RISPERIDONE 1 MG/1
0.5 TABLET ORAL EVERY 12 HOURS SCHEDULED
Status: DISCONTINUED | OUTPATIENT
Start: 2024-10-30 | End: 2024-11-01 | Stop reason: HOSPADM

## 2024-10-30 RX ORDER — FENTANYL 25 UG/1
1 PATCH TRANSDERMAL
Status: DISCONTINUED | OUTPATIENT
Start: 2024-10-30 | End: 2024-10-31 | Stop reason: DRUGHIGH

## 2024-10-30 RX ORDER — HYDROMORPHONE HYDROCHLORIDE 2 MG/1
2 TABLET ORAL 2 TIMES DAILY PRN
Status: DISCONTINUED | OUTPATIENT
Start: 2024-10-30 | End: 2024-10-30

## 2024-10-30 RX ADMIN — CLONIDINE HYDROCHLORIDE 0.1 MG: 0.1 TABLET ORAL at 14:22

## 2024-10-30 RX ADMIN — PANTOPRAZOLE SODIUM 40 MG: 40 TABLET, DELAYED RELEASE ORAL at 08:00

## 2024-10-30 RX ADMIN — ACETAMINOPHEN 650 MG: 325 TABLET ORAL at 02:07

## 2024-10-30 RX ADMIN — LEVOTHYROXINE SODIUM 175 MCG: 75 TABLET ORAL at 05:22

## 2024-10-30 RX ADMIN — RISPERIDONE 0.5 MG: 1 TABLET, FILM COATED ORAL at 08:00

## 2024-10-30 RX ADMIN — LIDOCAINE 3 PATCH: 4 PATCH TOPICAL at 08:01

## 2024-10-30 RX ADMIN — HYDROMORPHONE HYDROCHLORIDE 0.5 MG: 1 INJECTION, SOLUTION INTRAMUSCULAR; INTRAVENOUS; SUBCUTANEOUS at 16:29

## 2024-10-30 RX ADMIN — HYDROMORPHONE HYDROCHLORIDE 1 MG: 1 INJECTION, SOLUTION INTRAMUSCULAR; INTRAVENOUS; SUBCUTANEOUS at 15:05

## 2024-10-30 RX ADMIN — SEVELAMER CARBONATE 1600 MG: 800 TABLET, FILM COATED ORAL at 08:00

## 2024-10-30 RX ADMIN — Medication 10 ML: at 20:33

## 2024-10-30 RX ADMIN — FENTANYL 1 PATCH: 25 PATCH TRANSDERMAL at 16:30

## 2024-10-30 RX ADMIN — CARVEDILOL 12.5 MG: 12.5 TABLET, FILM COATED ORAL at 08:00

## 2024-10-30 RX ADMIN — PREGABALIN 25 MG: 25 CAPSULE ORAL at 08:00

## 2024-10-30 RX ADMIN — CLONIDINE HYDROCHLORIDE 0.1 MG: 0.1 TABLET ORAL at 05:22

## 2024-10-30 RX ADMIN — Medication 10 ML: at 08:01

## 2024-10-30 RX ADMIN — SEVELAMER CARBONATE 1600 MG: 800 TABLET, FILM COATED ORAL at 14:23

## 2024-10-30 RX ADMIN — HEPARIN SODIUM 3800 UNITS: 1000 INJECTION INTRAVENOUS; SUBCUTANEOUS at 12:38

## 2024-10-30 NOTE — THERAPY EVALUATION
Patient Name: Nelson Wang  : 1945    MRN: [id number]                              Today's Date: 10/30/2024       Admit Date: 10/27/2024    Visit Dx:     ICD-10-CM ICD-9-CM   1. Encephalopathy acute  G93.40 348.30   2. Left upper quadrant pain  R10.12 789.02   3. LUQ pain  R10.12 789.02   4. Difficulty in walking  R26.2 719.7   5. Decreased activities of daily living (ADL)  Z78.9 V49.89     Patient Active Problem List   Diagnosis    Essential hypertension    Bradycardia, sinus    Anemia due to chronic kidney disease    Hypothyroidism    Idiopathic gout    Proteinuria    Psoriasis    Thrombocytopenia    Type 2 diabetes mellitus without complication    CKD (chronic kidney disease), stage IV    Hyperlipidemia    Monoclonal gammopathy of unknown significance (MGUS)    Stage 5 chronic kidney disease    Chronic gout without tophus    Peritoneal dialysis catheter dysfunction    Medicare annual wellness visit, subsequent    Chronic cough    Peritonitis associated with peritoneal dialysis    Recurrent pneumonia    Acute on chronic respiratory failure with hypoxia    End stage renal disease    Aspiration pneumonitis    Sepsis    Type 2 diabetes mellitus, with long-term current use of insulin    GERD without esophagitis    Immunosuppression due to drug therapy    Bipolar disorder    Chronic respiratory failure with hypoxia    Shortness of breath    Abnormal stress test    ESRD on dialysis    Abnormal chest CT    Encounter for screening for malignant neoplasm of colon    History of colon polyps    Family history of colon cancer    Intractable pain    Abdominal pain    ESRD (end stage renal disease) on dialysis    AMS (altered mental status)    Hallucinations    LUQ pain     Past Medical History:   Diagnosis Date    Abnormal stress test     Anemia     IRON INFUSIONS WITH DIALYSIS    Anesthesia     WITH HIP REPLACEMENT DAUGHTER BELIEVES HAD SVT     Ankle pain, right     Anxiety and depression     CKD (chronic kidney  disease), stage IV     DIALYSIS [id number] SHILEY IN RIGHT CHEST    Diabetes     Gout     High cholesterol     History of peritoneal dialysis     HL (hearing loss)     Hyperkalemia     Hypertension     Hypothyroidism     Insomnia     Night terrors     Psoriasis     Psoriasis     Sleep walking     Thrombocytopenia     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    Vitiligo      Past Surgical History:   Procedure Laterality Date    ANKLE SURGERY Right 11/1990    APPENDECTOMY N/A 1954    ARTERIOVENOUS FISTULA/SHUNT SURGERY Left 07/16/2024    Procedure: Creation of left arm arteriovenous fistula;  Surgeon: Moses Mir MD;  Location: [facility] MAIN OR;  Service: Vascular;  Laterality: Left;    BRONCHOSCOPY N/A 03/01/2024    Procedure: BRONCHOSCOPY WITH BAL AND WASHINGS;  Surgeon: Sandra Espinal MD;  Location: [facility] ENDOSCOPY;  Service: Pulmonary;  Laterality: N/A;  PNEUMONIA    BRONCHOSCOPY N/A 08/30/2024    Procedure: BRONCHOSCOPY WITH BALS AND WASHINGS;  Surgeon: Sandra Espinal MD;  Location: [facility] ENDOSCOPY;  Service: Pulmonary;  Laterality: N/A;  MUCOUS PLUGGING    CARDIAC CATHETERIZATION N/A 05/29/2024    Procedure: Left Heart Cath with possible coronary angioplasty;  Surgeon: Stephan Nichols MD;  Location: [facility] CATH INVASIVE LOCATION;  Service: Cardiology;  Laterality: N/A;    COLONOSCOPY N/A 06/2022    [facility]    ENDOSCOPY N/A 10/28/2024    Procedure: ESOPHAGOGASTRODUODENOSCOPY INSERTION OF LIGHTED INSTRUMENT TO VIEW ESOPHAGUS, STOMACH AND SMALL INTESTINE;  Surgeon: Kingsley Peraza MD;  Location: [facility] ENDOSCOPY;  Service: Gastroenterology;  Laterality: N/A;  Gastritis    HIP BIPOLAR REPLACEMENT Right 01/2000    INSERTION HEMODIALYSIS CATHETER Left 04/09/2024    Procedure: HEMODIALYSIS CATHETER INSERTION;  Surgeon: Jose Berry MD;  Location: [facility] OR;  Service: General;  Laterality: Left;    INSERTION HEMODIALYSIS CATHETER N/A 04/12/2024    Procedure: Tunneled  hemodialysis catheter insertion;  Surgeon: Enrique Vinson MD;  Location: [facility] MAIN OR;  Service: Vascular;  Laterality: N/A;    INSERTION PERITONEAL DIALYSIS CATHETER N/A 03/27/2023    Procedure: LAPAROSCOPIC INSERTION PERITONEAL DIALYSIS CATHETER, LAPAROSCOPIC OMENTOPEXY WITH LYSIS OF ADHESIONS;  Surgeon: Jose Berry MD;  Location: [facility] MAIN OR;  Service: General;  Laterality: N/A;    INSERTION PERITONEAL DIALYSIS CATHETER Left 07/23/2023    Procedure: REVISION OF PERITONEAL DIALYSIS CATHETER;  Surgeon: Radha Oreilly MD;  Location: [facility] MAIN OR;  Service: General;  Laterality: Left;    REMOVAL PERITONEAL DIALYSIS CATHETER N/A 04/09/2024    Procedure: REMOVAL PERITONEAL DIALYSIS CATHETER;  Surgeon: Jose Berry MD;  Location: [facility] MAIN OR;  Service: General;  Laterality: N/A;    RENAL BIOPSY Left 07/15/2022    UPPER GASTROINTESTINAL ENDOSCOPY      WRIST SURGERY      UNSURE WHICH SIDE DAUGHTER REPORTS HAD SEVERE  CUT FROM WINDOW AND THEY HAD TO DO RECONSTRUCTIVE SURGERY WITH VESSELS AND NERVES      General Information       Row Name 10/30/24 8884          OT Time and Intention    Document Type evaluation  -LF     Mode of Treatment individual therapy;occupational therapy  -LF     Patient Effort fair  -LF       Row Name 10/30/24 5506          General Information    Patient Profile Reviewed yes  -LF     Prior Level of Function --  Pt reports requiring increased assist with ADLs and ambulating with a RW. Unable to elaborate further on subjective information d/t pain, nursing aware. Per daughter he was fully (I) with ADLs/IADLs, and driving prior to the last 1-2 months.  -LF     Existing Precautions/Restrictions fall  -LF     Barriers to Rehab none identified  -LF       Row Name 10/30/24 7554          Occupational Profile    Reason for Services/Referral (Occupational Profile) Patient is a 79 year old male admitted to [facility] for AMS and abdominal pain on October  27th, 2024. Occupational therapy consulted due to recent decline in ADLs/functional transfers. No previous occupational therapy services for current condition.  -       Row Name 10/30/24 1507          Living Environment    People in Home spouse  -       Row Name 10/30/24 1507          Cognition    Orientation Status (Cognition) oriented to;person  -       Row Name 10/30/24 1507          Safety Issues/Impairments Affecting Functional Mobility    Safety Issues Affecting Function (Mobility) ability to follow commands;awareness of need for assistance;impulsivity;judgment;safety precaution awareness  -     Impairments Affecting Function (Mobility) balance;endurance/activity tolerance;pain;cognition  -               User Key  (r) = Recorded By, (t) = Taken By, (c) = Cosigned By      Initials Name Provider Type     Claudette Courtney OT Occupational Therapist                     Mobility/ADL's       Row Name 10/30/24 1509          Bed Mobility    Bed Mobility supine-sit  -     Supine-Sit [place] (Bed Mobility) minimum assist (75% patient effort)  -       Row Name 10/30/24 1509          Transfers    Transfers sit-stand transfer;stand-sit transfer  -       Row Name 10/30/24 1509          Sit-Stand Transfer    Sit-Stand [place] (Transfers) contact guard  -     Assistive Device (Sit-Stand Transfers) other (see comments)  handheld  -       Row Name 10/30/24 1509          Stand-Sit Transfer    Stand-Sit [place] (Transfers) contact guard  -     Assistive Device (Stand-Sit Transfers) other (see comments)  handheld  -       Row Name 10/30/24 1509          Activities of Daily Living    BADL Assessment/Intervention bathing;upper body dressing;lower body dressing;grooming;feeding;toileting  -       Row Name 10/30/24 1509          Bathing Assessment/Intervention    [place] Level (Bathing) bathing skills;upper body;lower body;moderate assist (50% patient effort)  -       Row Name 10/30/24  1509          Upper Body Dressing Assessment/Training    [place] Level (Upper Body Dressing) upper body dressing skills;minimum assist (75% patient effort)  -       Row Name 10/30/24 1509          Lower Body Dressing Assessment/Training    [place] Level (Lower Body Dressing) lower body dressing skills;moderate assist (50% patient effort)  -LF       Row Name 10/30/24 1509          Grooming Assessment/Training    [place] Level (Grooming) grooming skills;minimum assist (75% patient effort)  -LF       Row Name 10/30/24 1509          Self-Feeding Assessment/Training    [place] Level (Feeding) feeding skills;set up  -LF       Row Name 10/30/24 1509          Toileting Assessment/Training    [place] Level (Toileting) toileting skills;moderate assist (50% patient effort)  -               User Key  (r) = Recorded By, (t) = Taken By, (c) = Cosigned By      Initials Name Provider Type     Claudette Courtney OT Occupational Therapist                   Obj/Interventions       [facility] Name 10/30/24 1510          Sensory Assessment (Somatosensory)    Sensory Assessment (Somatosensory) UE sensation intact  -LF       Row Name 10/30/24 1510          Vision Assessment/Intervention    Visual Impairment/Limitations WFL  -LF       Row Name 10/30/24 1510          Range of Motion Comprehensive    General Range of Motion bilateral upper extremity ROM WFL  -LF       Row Name 10/30/24 1510          Strength Comprehensive (MMT)    Comment, General Manual Muscle Testing (MMT) Assessment 4/5 BUEs  -LF       Row Name 10/30/24 1510          Motor Skills    Motor Skills coordination;functional endurance  -     Coordination bilateral;upper extremity;WFL  -     Functional Endurance Fair/fair-  -LF       Row Name 10/30/24 1510          Balance    Balance Assessment sitting dynamic balance;standing dynamic balance  -     Dynamic Sitting Balance supervision  -     Position, Sitting Balance unsupported;sitting edge of bed   -LF     Dynamic Standing Balance contact guard  -LF     Position/Device Used, Standing Balance supported;other (see comments)  handheld  -LF               User Key  (r) = Recorded By, (t) = Taken By, (c) = Cosigned By      Initials Name Provider Type    LF Claudette Courtney, OT Occupational Therapist                   Goals/Plan       Row Name 10/30/24 1512          Bed Mobility Goal 1 (OT)    Activity/Assistive Device (Bed Mobility Goal 1, OT) bed mobility activities, all  -LF     [place] Level/Cues Needed (Bed Mobility Goal 1, OT) modified independence  -LF     Time Frame (Bed Mobility Goal 1, OT) long term goal (LTG);10 days  -LF       Row Name 10/30/24 1512          Transfer Goal 1 (OT)    Activity/Assistive Device (Transfer Goal 1, OT) transfers, all  -LF     [place] Level/Cues Needed (Transfer Goal 1, OT) modified independence  -LF     Time Frame (Transfer Goal 1, OT) long term goal (LTG);10 days  -LF       Row Name 10/30/24 1512          Bathing Goal 1 (OT)    Activity/Device (Bathing Goal 1, OT) bathing skills, all  -LF     [place] Level/Cues Needed (Bathing Goal 1, OT) modified independence  -LF     Time Frame (Bathing Goal 1, OT) long term goal (LTG);10 days  -LF       Row Name 10/30/24 1512          Dressing Goal 1 (OT)    Activity/Device (Dressing Goal 1, OT) dressing skills, all  -LF     [place]/Cues Needed (Dressing Goal 1, OT) modified independence  -LF     Time Frame (Dressing Goal 1, OT) long term goal (LTG);10 days  -LF       Row Name 10/30/24 1512          Toileting Goal 1 (OT)    Activity/Device (Toileting Goal 1, OT) toileting skills, all  -LF     [place] Level/Cues Needed (Toileting Goal 1, OT) modified independence  -LF     Time Frame (Toileting Goal 1, OT) long term goal (LTG);10 days  -LF       Row Name 10/30/24 1512          Problem Specific Goal 1 (OT)    Problem Specific Goal 1 (OT) Patient will demonstrate good endurance to support ADLs/functional transfers.  -LF      Time Frame (Problem Specific Goal 1, OT) long term goal (LTG);10 days  -       Row Name 10/30/24 1512          Therapy Assessment/Plan (OT)    Planned Therapy Interventions (OT) activity tolerance training;BADL retraining;functional balance retraining;occupation/activity based interventions;patient/caregiver education/training;strengthening exercise;transfer/mobility retraining  -               User Key  (r) = Recorded By, (t) = Taken By, (c) = Cosigned By      Initials Name Provider Type     Claudette Courtney OT Occupational Therapist                   Clinical Impression       Row Name 10/30/24 1511          Pain Assessment    Pain Management Interventions positioning techniques utilized;nursing notified  -     Additional Documentation Pain Scale: FACES Pre/Post-Treatment (Group)  -       Row Name 10/30/24 1511          Pain Scale: FACES Pre/Post-Treatment    Pain: FACES Scale, Pretreatment 6-->hurts even more  -     Posttreatment Pain Rating 8-->hurts whole lot  -       Row Name 10/30/24 1511          Plan of Care Review    Plan of Care Reviewed With patient  -     Progress no change  -     Outcome Evaluation Patient presents with limitations in self-care, functional transfers, balance, and endurance. He would benefit from continued skilled occupational therapy services to maximize independence with ADLs/functional transfers.  -       Row Name 10/30/24 1511          Therapy Assessment/Plan (OT)    Patient/Family Therapy Goal Statement (OT) To maximize independence.  -     Rehab Potential (OT) good  -     Criteria for Skilled Therapeutic Interventions Met (OT) yes;meets criteria;skilled treatment is necessary  -     Therapy Frequency (OT) 5 times/wk  -       Row Name 10/30/24 1511          Therapy Plan Review/Discharge Plan (OT)    Anticipated Discharge Disposition (OT) inpatient rehabilitation facility  -       Row Name 10/30/24 1511          Vital Signs    O2 Delivery Pre  Treatment room air  -LF     O2 Delivery Intra Treatment room air  -LF     O2 Delivery Post Treatment room air  -LF       Row Name 10/30/24 1511          Positioning and Restraints    Pre-Treatment Position in bed  -LF     Post Treatment Position chair  -LF     In Chair sitting;with family/caregiver;with nsg  safety sitter  -LF               User Key  (r) = Recorded By, (t) = Taken By, (c) = Cosigned By      Initials Name Provider Type     Claudette Courtney OT Occupational Therapist                   Outcome Measures       Row Name 10/30/24 1512          How much help from another is currently needed...    Putting on and taking off regular lower body clothing? 2  -LF     Bathing (including washing, rinsing, and drying) 2  -LF     Toileting (which includes using toilet bed pan or urinal) 2  -LF     Putting on and taking off regular upper body clothing 2  -LF     Taking care of personal grooming (such as brushing teeth) 3  -LF     Eating meals 4  -LF     AM-PAC 6 Clicks Score (OT) 15  -LF       Row Name 10/30/24 0800          How much help from another person do you currently need...    Turning from your back to your side while in flat bed without using bedrails? 3  -LC     Moving from lying on back to sitting on the side of a flat bed without bedrails? 3  -LC     Moving to and from a bed to a chair (including a wheelchair)? 3  -LC     Standing up from a chair using your arms (e.g., wheelchair, bedside chair)? 3  -LC     Climbing 3-5 steps with a railing? 2  -LC     To walk in hospital room? 3  -LC     AM-PAC 6 Clicks Score (PT) 17  -LC     Highest Level of Mobility Goal 5 --> Static standing  -LC       Row Name 10/30/24 1512          Functional Assessment    Outcome Measure Options AM-PAC 6 Clicks Daily Activity (OT);Optimal Instrument  -LF       Row Name 10/30/24 1512          Optimal Instrument    Optimal Instrument Optimal - 3  -LF     Bending/Stooping 5  -LF     Standing 2  -LF     Reaching 1  -LF     From the  list, choose the 3 activities you would most like to be able to do without any difficulty Bending/stooping;Standing;Reaching  -     Total Score Optimal - 3 8  -LF               User Key  (r) = Recorded By, (t) = Taken By, (c) = Cosigned By      Initials Name Provider Type     Tori Ying, RN Registered Nurse    Claudette Gregg OT Occupational Therapist                    Occupational Therapy Education       Title: PT OT SLP Therapies (In Progress)       Topic: Occupational Therapy (In Progress)       Point: ADL training (In Progress)       Description:   Instruct learner(s) on proper safety adaptation and remediation techniques during self care or transfers.   Instruct in proper use of assistive devices.                  Learning Progress Summary            Patient Acceptance, E,TB, NR by  at 10/30/2024 1512                      Point: Precautions (In Progress)       Description:   Instruct learner(s) on prescribed precautions during self-care and functional transfers.                  Learning Progress Summary            Patient Acceptance, E,TB, NR by  at 10/30/2024 1512                      Point: Body mechanics (In Progress)       Description:   Instruct learner(s) on proper positioning and spine alignment during self-care, functional mobility activities and/or exercises.                  Learning Progress Summary            Patient Acceptance, E,TB, NR by  at 10/30/2024 1512                                      User Key       Initials Effective Dates Name Provider Type [facility] 06/16/21 -  Claudette Courtney OT Occupational Therapist OT                  OT Recommendation and Plan  Planned Therapy Interventions (OT): activity tolerance training, BADL retraining, functional balance retraining, occupation/activity based interventions, patient/caregiver education/training, strengthening exercise, transfer/mobility retraining  Therapy Frequency (OT): 5 times/wk  Plan of Care Review  Plan of  Care Reviewed With: patient  Progress: no change  Outcome Evaluation: Patient presents with limitations in self-care, functional transfers, balance, and endurance. He would benefit from continued skilled occupational therapy services to maximize independence with ADLs/functional transfers.     Time Calculation:   Evaluation Complexity (OT)  Review Occupational Profile/Medical/Therapy History Complexity: brief/low complexity  Assessment, Occupational Performance/Identification of Deficit Complexity: 3-5 performance deficits  Clinical Decision Making Complexity (OT): problem focused assessment/low complexity  Overall Complexity of Evaluation (OT): low complexity     Time Calculation- OT       Row Name 10/30/24 1515 10/30/24 1137          Time Calculation- OT    OT Received On 10/30/24  -LF 10/30/24  -LF     OT Goal Re-Cert Due Date 11/08/24  -LF --        Untimed Charges    OT Eval/Re-eval Minutes 33  -LF --        Total Minutes    Untimed Charges Total Minutes 33  -LF --      Total Minutes 33  -LF --               User Key  (r) = Recorded By, (t) = Taken By, (c) = Cosigned By      Initials Name Provider Type    LF Claudette Courtney OT Occupational Therapist                  Therapy Charges for Today       Code Description Service Date Service Provider Modifiers Qty    [id number] HC OT EVAL LOW COMPLEXITY 3 10/30/2024 Claudette Courtney OT GO 1                 Claudette Courtney OT  10/30/2024

## 2024-10-30 NOTE — SIGNIFICANT NOTE
10/30/24 1137   OTHER   Discipline occupational therapist   Rehab Time/Intention   Session Not Performed patient unavailable for evaluation  (dialysis)

## 2024-10-30 NOTE — NURSING NOTE
Duration of Treatment 4.0 Hours                                   4 hrs   Access Site Tunneled Dialysis Catheter   Dialyzer Revaclear    mL/min   Dialysate Temperature (C) 36   BFR-As tolerated to a maximum of: 400 mL/min   Dialysate Solution Bath: K+ = 3 mEq, CA = 2.5mg   Bicarb 33 mEq   Na+ 137 meq   Fluid Removal: 2L                                               2000 ml     Pt completed 4 hr treatment with no issues.  Sitter at bedside during entire treatment.  Last /77.  Pt confused during treatment with illogical speech and not answering questions appropriately.  Report given to HIREN Ritter at 1250.

## 2024-10-30 NOTE — PLAN OF CARE
Goal Outcome Evaluation:  Plan of Care Reviewed With: patient        Progress: no change  Outcome Evaluation: Patient presents with limitations in self-care, functional transfers, balance, and endurance. He would benefit from continued skilled occupational therapy services to maximize independence with ADLs/functional transfers.    Anticipated Discharge Disposition (OT): inpatient rehabilitation facility

## 2024-10-30 NOTE — SIGNIFICANT NOTE
10/30/24 1537   Physical Therapy Time and Intention   Session Not Performed patient/family declined treatment  (Pt became combative when walking with nursing staff, excused from treatment today.)

## 2024-10-30 NOTE — PLAN OF CARE
Goal Outcome Evaluation:  Plan of Care Reviewed With: patient        Progress: no change  Outcome Evaluation: patient a/ox self overnight, safety watch at bedside and bed alarm in place. patient had multiple attempts to get out of bed and pulled out peripheral iv access, agitated at times and physically combative with staff at times. reoriented and facilitated quiet environment. persistent complaints of L back, shoulder, and flank pain, see mar. fentanyl patch in place per order. sinus rhythm on monitor. patient allowed staff to give partial bath this am. call light within reach, frequent rounding ongoing. continue plan of care.

## 2024-10-30 NOTE — PROGRESS NOTES
[facility]     Nephrology Progress Note      Patient Name: Nelson Wang  : 1945  MRN: [id number]  Primary Care Physician:  Domingo Bravo MD  Date of admission: 10/27/2024    Subjective   Subjective     Interval History:  Patient is very confused when I saw him today.  Status post dialysis earlier today.  Well-tolerated.  Sitting in chair, sitter in place.  Ate very little.  While I am talking to him, had sudden onset chest wall pain on the left side, very tender to the touch but nothing to see my exam.      Review of Systems   Was not obtained due to the above.    Objective   Objective     Vitals:   Temp:  [97.1 °F (36.2 °C)-97.8 °F (36.6 °C)] 97.4 °F (36.3 °C)  Heart Rate:  [66-81] 81  Resp:  [16-18] 18  BP: (105-194)/() 139/77  Flow (L/min) (Oxygen Therapy):  [2] 2  Physical Exam:   Constitutional: In chair, no distress, confused, paranoid.  He recognized me by name.   Eyes: sclerae anicteric, no conjunctival injection   HENT: mucous membranes moist   Neck: Supple, no thyromegaly, no lymphadenopathy, trachea midline, No JVD   Respiratory: Clear to auscultation bilaterally, nonlabored respirations, poor efforts.  Chest wall tenderness on the left side.  No rash.   Cardiovascular: RRR, no murmurs, rubs, or gallops.   Gastrointestinal: Positive bowel sounds, soft, nontender, nondistended   Musculoskeletal: No edema, no clubbing or cyanosis, left upper extremity AV fistula, patent.  Right IJ TDC.   Psychiatric: Agitated and confused, paranoid   Neurologic: Moving extremities.   Skin: warm and dry, vitiligo/psoriasis.      Result Review    Result Reviewed:  I have personally reviewed the results from the time of this admission to 10/30/2024 15:45 EDT and agree with these findings:  [x]  Laboratory  []  Microbiology  [x]  Radiology  []  EKG/Telemetry   []  Cardiology/Vascular   []  Pathology  [x]  Old records  []  Other:        Lab 10/30/24  0436 10/29/24  0304 10/28/24  0433 10/27/24  0418    SODIUM 130* 130* 127* 130*   POTASSIUM 4.3 4.1 3.7 3.2*   CHLORIDE 92* 94* 87* 87*   CO2 26.6 27.3 28.9 31.3*   BUN 34* 25* 37* 29*   CREATININE 5.82* 4.40* 6.14* 4.96*   GLUCOSE 159* 209* 132* 155*   EGFR 9.3* 13.0* 8.7* 11.3*   ANION GAP 11.4 8.7 11.1 11.7   MAGNESIUM 1.9 1.9 1.8 1.8   PHOSPHORUS 3.5 3.4 4.1  --            Assessment & Plan   Assessment / Plan       Active Hospital Problems:  Active Hospital Problems    Diagnosis     **AMS (altered mental status)     Hallucinations     LUQ pain     Abdominal pain     ESRD on dialysis     Essential hypertension     Type 2 diabetes mellitus without complication        Assessment and Plan:    1.  ESRD:  Patient was on home hemodialysis receiving treatment 5 days a week.  Has left upper extremity AV fistula that is in the process of being used, still with right IJ TDC.  Continue m/w/f in hospital.  May need additional dialysis tomorrow if he receives IV contrast for imaging.     2.  Anemia: On Mircera protocol at HD unit.  S/p transfusion.     3.  Low K- Improved, monitor.  magnesium okay.     4.  Abdominal pain/left chest wall pain/tenderness, etiology is uncertain.     5.  Hypertension: Blood pressure is on the higher side.  Better after dialysis.  Having trouble taking his medications.  Medications are being adjusted.  2D echo showed mild LVH with grade 1 diastolic dysfunction.     6.  History of hypothyroidism on Synthroid, per primary.     7.  Hyponatremia: Mild, needs 1800 cc p.o. fluid restriction per day.  Recheck in AM.       8.  Hyperphosphatemia: Continue phosphate binder and low phosphorus diet.     9.  Confusion-as above.  Avoid Lyrica if possible.  Avoid sedatives.  Will defer to primary.    Discussed with nursing staff.

## 2024-10-30 NOTE — PROGRESS NOTES
[facility]   Hospitalist Progress Note  Date: 10/30/2024  Patient Name: Nelson Wang  : 1945  MRN: [id number]  Date of admission: 10/27/2024      Subjective   Subjective     Chief complaint: Altered mental status, hallucinations    Summary:  79-year-old male with ESRD on home dialysis, dyslipidemia, hypertension, hypothyroidism, history of night terrors, chronic thrombocytopenia, anxiety, depression, recent history of L3 compression fracture, with degenerative changes through the lumbar spine, diabetes, history of alcohol use in excess, ex-smoker, hospitalized on 10/27/2024 for chief complaint of altered mental status and hallucinations, prior to hospitalization was having several medication changes to determine the best medications to manage symptoms.  Held several medications, nephrology consulted, patient has elevated troponin which is chronic and related to renal disease.  Degree of troponin elevation with negative delta is not indicative of cardiac issue.  Ongoing abdominal pain complaints, GI consulted, planning EGD and scope.  Psychiatry consulted, recommending risperidone, not initiated given prolonged QT.  Trial of 0.125 mg performed, did not respond.  Ongoing pain control, difficulty managing, added fentanyl patch and Lyrica.  Status post EGD, 10/28/2024, found to have gastritis.    Interval follow-up: Patient seen and examined this morning while down in dialysis, no acute distress, no acute major overnight events, no major events on telemetry, no chest pain symptoms, continues to have hallucinations, and continued difficulty with pain control primarily in his upper back, shoulders, extremities, hemoglobin is responded to blood transfusion around 8.0.  Creatinine 5.82, BUN 34, potassium 4.3, sodium 130.  QTc in the 460 range.  Given ongoing issues with pain control, added spot dose Dilaudid 1 mg IV for postdialysis administration, okay to access patient's port.  He still has oral Dilaudid 1  mg every 6 hours.  For severe pain, his blood pressure appears to be high enough to tolerate this, further imaging being pursued, with IV contrast on his CT chest and abdomen as well as pelvis to determine whether or not he has some sort of occult issue going on not picked up on previous scans that is causing him significant grief and pain.  He was asking for antibiotics while in dialysis.  He has been afebrile.    Review of systems:  All systems reviewed and negative except for weakness, fatigue, back pain, confusion, hallucinations      Objective   Objective     Vitals:   Temp:  [97.1 °F (36.2 °C)-97.8 °F (36.6 °C)] 97.4 °F (36.3 °C)  Heart Rate:  [66-81] 81  Resp:  [16-18] 18  BP: (105-194)/() 139/77  Flow (L/min) (Oxygen Therapy):  [2] 2  Physical Exam   Constitutional: Awake, alert, disoriented but pleasantly confused, laying in hospital bed receiving dialysis services  Eyes: Pupils equal, sclerae anicteric, no conjunctival injection  HENT: NCAT, mucous membranes moist  Neck: Supple, full range of motion  Respiratory: Diminished coarse breath sounds with no rhonchi or wheezing, no crackles  Cardiovascular: RRR, no murmurs, rubs, or gallops, palpable pedal pulses bilaterally  Gastrointestinal: Positive bowel sounds, soft, nontender, distended  Musculoskeletal: No bilateral ankle edema, no clubbing or cyanosis to extremities  Psychiatric: Anxious but cooperative  Neurologic: Disoriented but alert and awake and pleasantly confused, strength symmetric in all extremities, Cranial Nerves grossly intact to confrontation, speech garbled at time  Skin: No rashes visible on exposed skin     Result Review    Result Review:  I have personally reviewed the pertinent results from the past 24 hours to 10/30/2024 15:10 EDT and agree with these findings:  [x]  Laboratory   CBC          10/28/2024    04:33 10/29/2024    03:04 10/29/2024    11:37 10/30/2024    04:36   CBC   WBC 6.18  6.36   6.52    RBC 2.66  2.37   2.83     Hemoglobin 7.4  6.8  8.2  8.0    Hematocrit 24.6  22.3  27.0  26.2    MCV 92.5  94.1   92.6    MCH 27.8  28.7   28.3    MCHC 30.1  30.5   30.5    RDW 15.9  16.1   15.9    Platelets 219  197   191      BMP          10/28/2024    04:33 10/29/2024    03:04 10/30/2024    04:36   BMP   BUN 37  25  34    Creatinine 6.14  4.40  5.82    Sodium 127  130  130    Potassium 3.7  4.1  4.3    Chloride 87  94  92    CO2 28.9  27.3  26.6    Calcium 9.0  8.4  8.8      LIVER FUNCTION TESTS:      Lab 10/30/24  0436 10/29/24  0304 10/28/24  0433 10/27/24  0418   TOTAL PROTEIN 6.8 6.2 6.9 7.4   ALBUMIN 3.3* 2.9* 3.2* 3.5   GLOBULIN  --   --   --  3.9   ALT (SGPT) <5 <5 5 5   AST (SGOT) 8 8 9 10   BILIRUBIN 0.9 0.7 0.9 1.0   INDIRECT BILIRUBIN 0.4 0.3 0.5  --    BILIRUBIN DIRECT 0.5* 0.4* 0.4*  --    ALK PHOS 69 65 71 78       [x]  Microbiology   Microbiology Results (last 10 days)       ** No results found for the last 240 hours. **              [x]  Radiology XR Shoulder 2+ View Left    Result Date: 10/28/2024  Mild AC joint arthropathy. No acute findings in the shoulder. Electronically Signed: Cristian Tiwari MD  10/28/2024 10:08 PM EDT  Workstation ID: [id number]    XR Chest 1 View    Result Date: 10/27/2024  Stable cardiac enlargement with mild vascular congestion. Electronically Signed: Cristian Tiwari MD  10/27/2024 4:12 AM EDT  Workstation ID: [id number]       [x]  EKG/Telemetry   ECG 12 Lead QT Measurement   Preliminary Result   HEART RATE=66  bpm   RR Psiytrus=949  ms   OH Interval=68  ms   P Horizontal Axis=-23  deg   P Front Axis=0  deg   QRSD Interval=95  ms   QT Ketwxdrc=049  ms   IFyQ=530  ms   QRS Axis=0  deg   T Wave Axis=5  deg   - ABNORMAL ECG -   Sinus rhythm   Short OH interval   Anteroseptal infarct, age indeterminate   ST elevation, consider inferior injury   Date and Time of Study:2024-10-30 08:36:57      ECG 12 Lead QT Measurement   Final Result   HEART RATE=72  bpm   RR Lziidjov=247  ms   OH Tcxuiuok=652  ms   P  Horizontal Axis=-3  deg   P Front Axis=41  deg   QRSD Interval=90  ms   QT Ouromwmf=793  ms   FKfC=549  ms   QRS Axis=-32  deg   T Wave Axis=3  deg   - ABNORMAL ECG -   Sinus rhythm   Borderline  prolonged OH interval   Abnormal R-wave progression, late transition   Inferior  infarct, old   When compared with ECG of 28-Oct-2024 16:03:12,   New or worsened ischemia or infarction   New conduction abnormality   Significant repolarization change   Electronically Signed By: Shiraz Barajas ([facility]) 2024-10-29 18:45:23   Date and Time of Study:2024-10-29 07:56:03      ECG 12 Lead QT Measurement   Final Result   HEART RATE=63  bpm   RR Bjxdwjqr=667  ms   OH Qijcrxmk=653  ms   P Horizontal Axis=16  deg   P Front Axis=24  deg   QRSD Interval=93  ms   QT Zlvxjbsx=843  ms   MUfV=587  ms   QRS Axis=-21  deg   T Wave Axis=-4  deg   - ABNORMAL ECG -   Sinus rhythm   Borderline  left axis deviation   Abnormal R-wave progression, late transition   Borderline  T abnormalities, inferior leads   Prolonged QT interval   When compared with ECG of 28-Oct-2024 12:17:38,   No significant change   Electronically Signed By: Warren Aguilar ([facility]) 2024-10-29 20:57:28   Date and Time of Study:2024-10-28 16:03:12      ECG 12 Lead Chest Pain   Final Result   HEART RATE=63  bpm   RR Roaotige=299  ms   OH Interval=  ms   P Horizontal Axis=-7  deg   P Front Axis=Invalid  deg   QRSD Interval=93  ms   QT Odyggvik=288  ms   ZDiP=536  ms   QRS Axis=-27  deg   T Wave Axis=1  deg   - ABNORMAL ECG -   Sinus rhythm   Abnormal R-wave progression, late transition   Probable  inferior infarct, age indeterminate   Prolonged QT interval   When compared with ECG of 28-Oct-2024 12:17:38,   Significant change in rhythm: previously sinus   Electronically Signed By: Warren Aguilar ([facility]) 2024-10-29 20:57:54   Date and Time of Study:2024-10-28 16:01:57      ECG 12 Lead QT Measurement   Final Result   HEART RATE=62  bpm   RR Xjvtneml=689  ms   OH Pamonxmx=843  ms   P Horizontal  Axis=  deg   P Front Axis=35  deg   QRSD Interval=94  ms   QT Wwfwwnjx=908  ms   RZxI=892  ms   QRS Axis=-24  deg   T Wave Axis=-5  deg   - ABNORMAL ECG -   Sinus rhythm   Borderline  left axis deviation   Anteroseptal  infarct, age indeterminate   When compared with ECG of 27-Oct-2024 04:01:14,   Significant change in rhythm   Significant repolarization change   Electronically Signed By: Warren Aguilar ([facility]) 2024-10-29 20:58:01   Date and Time of Study:2024-10-28 12:17:38      ECG 12 Lead ED Triage Standing Order; Weak / Dizzy / AMS   Preliminary Result   HEART RATE=77  bpm   RR Uhddlvjl=467  ms   NV Vnjggmme=708  ms   P Horizontal Axis=10  deg   P Front Axis=-57  deg   QRSD Interval=92  ms   QT Taopdxzs=774  ms   DLjN=113  ms   QRS Axis=-30  deg   T Wave Axis=34  deg   - ABNORMAL ECG -   Sinus or ectopic atrial rhythm   Left axis deviation   Probable anterior infarct, age indeterminate   Prolonged QT interval   Date and Time of Study:2024-10-27 04:01:14      Telemetry Scan   Final Result          []  Cardiology/Vascular   []  Pathology  [x]  Old records  []  Other:    Assessment & Plan   Assessment / Plan     Assessment/Plan:  Assessment:  Hallucinations  Abdominal pain  Anxiety  Depression  Prolonged QTc  ESRD on HD  Dyslipidemia  Essential hypertension  Hypothyroidism  Night terrors  Chronic thrombocytopenia  L3 compression fracture  Diabetes mellitus  Elevated troponin noncardiac related; decreased renal clearance    Plan:  Labs and imaging reviewed  Persistent uncontrolled pain and hallucinations and confusion  Revised pain control, discontinue Neurontin  Spot dose Dilaudid 1 mg IV  Okay to access port  CT chest abdomen pelvis with IV contrast, discussed with Dr. Peraza and Dr. Castro, will add additional dialysis tomorrow  When accessing port obtain blood culture  Continue very low-dose Lyrica 25 mg daily  Continue lidocaine patch 12 mcg every 72 hours  Notify MD if patient has hemodynamic instability, remove  fentanyl patch  Lidocaine patch for local pain control  Increase Risperdal to 0.25 mg p.o. twice daily  GI recommendations appreciated  Continue at a reduced dose clonidine 0.1 mg every 8 hours  Continue at a reduced dose Coreg 12.5 mg twice a day  Continue Lipitor 40 mg nightly  Continue levothyroxine 175 mcg daily  Continue Protonix 40 mg twice daily  Continue sevelamer 1600 mg 3 times a day  Monitor routine EKGs for QTc measurement  PT/OT  Nephrology recommendations appreciated  Dialysis per nephrology  A.m. labs  Full code  DVT prophylaxis with SCDs  Clinical course dictate further management  Discussed with nurse at the bedside  VTE Prophylaxis:  Pharmacologic & mechanical VTE prophylaxis orders are present.        CODE STATUS:   Code Status (Patient has no pulse and is not breathing): CPR (Attempt to Resuscitate)  Medical Interventions (Patient has pulse or is breathing): Full Support        Electronically signed by Saúl Vargas MD, 10/30/2024, 15:10 EDT.    Portions of this documentation were transcribed electronically from a voice recognition software.  I confirm all data accurately represents the service(s) I performed at today's visit.

## 2024-10-31 ENCOUNTER — APPOINTMENT (OUTPATIENT)
Dept: CT IMAGING | Facility: HOSPITAL | Age: 79
End: 2024-10-31
Payer: MEDICARE

## 2024-10-31 ENCOUNTER — APPOINTMENT (OUTPATIENT)
Dept: GENERAL RADIOLOGY | Facility: HOSPITAL | Age: 79
End: 2024-10-31
Payer: MEDICARE

## 2024-10-31 VITALS
HEIGHT: 66 IN | HEART RATE: 91 BPM | WEIGHT: 159.83 LBS | BODY MASS INDEX: 25.69 KG/M2 | TEMPERATURE: 98.1 F | SYSTOLIC BLOOD PRESSURE: 147 MMHG | RESPIRATION RATE: 20 BRPM | OXYGEN SATURATION: 94 % | DIASTOLIC BLOOD PRESSURE: 63 MMHG

## 2024-10-31 LAB
ALBUMIN SERPL-MCNC: 3.2 G/DL (ref 3.5–5.2)
ANION GAP SERPL CALCULATED.3IONS-SCNC: 13.4 MMOL/L (ref 5–15)
ARTERIAL PATENCY WRIST A: POSITIVE
ATMOSPHERIC PRESS: 744.4 MMHG
BASE EXCESS BLDA CALC-SCNC: -0.4 MMOL/L (ref -2–2)
BDY SITE: ABNORMAL
BUN SERPL-MCNC: 21 MG/DL (ref 8–23)
BUN/CREAT SERPL: 5 (ref 7–25)
CALCIUM SPEC-SCNC: 8.9 MG/DL (ref 8.6–10.5)
CHLORIDE SERPL-SCNC: 95 MMOL/L (ref 98–107)
CO2 SERPL-SCNC: 23.6 MMOL/L (ref 22–29)
CREAT SERPL-MCNC: 4.24 MG/DL (ref 0.76–1.27)
CRP SERPL-MCNC: 21.87 MG/DL (ref 0–0.5)
DEPRECATED RDW RBC AUTO: 54.4 FL (ref 37–54)
EGFRCR SERPLBLD CKD-EPI 2021: 13.6 ML/MIN/1.73
ERYTHROCYTE [DISTWIDTH] IN BLOOD BY AUTOMATED COUNT: 15.8 % (ref 12.3–15.4)
ERYTHROCYTE [SEDIMENTATION RATE] IN BLOOD: 85 MM/HR (ref 0–20)
GAS FLOW AIRWAY: 2 LPM
GLUCOSE SERPL-MCNC: 117 MG/DL (ref 65–99)
HCO3 BLDA-SCNC: 26.2 MMOL/L (ref 22–26)
HCT VFR BLD AUTO: 26.8 % (ref 37.5–51)
HCT VFR BLD CALC: 28 % (ref 38–51)
HEMODILUTION: NO
HGB BLD-MCNC: 8.1 G/DL (ref 13–17.7)
HGB BLDA-MCNC: 9.4 G/DL (ref 12–18)
MAGNESIUM SERPL-MCNC: 1.9 MG/DL (ref 1.6–2.4)
MCH RBC QN AUTO: 28.4 PG (ref 26.6–33)
MCHC RBC AUTO-ENTMCNC: 30.2 G/DL (ref 31.5–35.7)
MCV RBC AUTO: 94 FL (ref 79–97)
MODALITY: ABNORMAL
MRSA DNA SPEC QL NAA+PROBE: NORMAL
PCO2 BLDA: 51.5 MM HG (ref 35–45)
PH BLDA: 7.31 PH UNITS (ref 7.35–7.45)
PHOSPHATE SERPL-MCNC: 4 MG/DL (ref 2.5–4.5)
PLATELET # BLD AUTO: 176 10*3/MM3 (ref 140–450)
PMV BLD AUTO: 9.5 FL (ref 6–12)
PO2 BLDA: 73.2 MM HG (ref 80–100)
POTASSIUM SERPL-SCNC: 4.4 MMOL/L (ref 3.5–5.2)
PROCALCITONIN SERPL-MCNC: 0.31 NG/ML (ref 0–0.25)
PROCALCITONIN SERPL-MCNC: 0.39 NG/ML (ref 0–0.25)
QT INTERVAL: 402 MS
QTC INTERVAL: 459 MS
RBC # BLD AUTO: 2.85 10*6/MM3 (ref 4.14–5.8)
SAO2 % BLDCOA: 92.8 % (ref 95–99)
SODIUM SERPL-SCNC: 132 MMOL/L (ref 136–145)
WBC NRBC COR # BLD AUTO: 6.4 10*3/MM3 (ref 3.4–10.8)

## 2024-10-31 PROCEDURE — 85027 COMPLETE CBC AUTOMATED: CPT | Performed by: INTERNAL MEDICINE

## 2024-10-31 PROCEDURE — 99239 HOSP IP/OBS DSCHRG MGMT >30: CPT | Performed by: PHYSICIAN ASSISTANT

## 2024-10-31 PROCEDURE — 74177 CT ABD & PELVIS W/CONTRAST: CPT

## 2024-10-31 PROCEDURE — 87147 CULTURE TYPE IMMUNOLOGIC: CPT | Performed by: FAMILY MEDICINE

## 2024-10-31 PROCEDURE — 84145 PROCALCITONIN (PCT): CPT | Performed by: FAMILY MEDICINE

## 2024-10-31 PROCEDURE — 85652 RBC SED RATE AUTOMATED: CPT | Performed by: PHYSICIAN ASSISTANT

## 2024-10-31 PROCEDURE — 36600 WITHDRAWAL OF ARTERIAL BLOOD: CPT

## 2024-10-31 PROCEDURE — 25510000001 IOPAMIDOL PER 1 ML: Performed by: FAMILY MEDICINE

## 2024-10-31 PROCEDURE — 99223 1ST HOSP IP/OBS HIGH 75: CPT | Performed by: INTERNAL MEDICINE

## 2024-10-31 PROCEDURE — 71045 X-RAY EXAM CHEST 1 VIEW: CPT

## 2024-10-31 PROCEDURE — 87186 SC STD MICRODIL/AGAR DIL: CPT | Performed by: FAMILY MEDICINE

## 2024-10-31 PROCEDURE — 88312 SPECIAL STAINS GROUP 1: CPT | Performed by: FAMILY MEDICINE

## 2024-10-31 PROCEDURE — 94640 AIRWAY INHALATION TREATMENT: CPT

## 2024-10-31 PROCEDURE — 83735 ASSAY OF MAGNESIUM: CPT | Performed by: INTERNAL MEDICINE

## 2024-10-31 PROCEDURE — 94761 N-INVAS EAR/PLS OXIMETRY MLT: CPT

## 2024-10-31 PROCEDURE — 94799 UNLISTED PULMONARY SVC/PX: CPT

## 2024-10-31 PROCEDURE — 87154 CUL TYP ID BLD PTHGN 6+ TRGT: CPT | Performed by: FAMILY MEDICINE

## 2024-10-31 PROCEDURE — 93005 ELECTROCARDIOGRAM TRACING: CPT | Performed by: FAMILY MEDICINE

## 2024-10-31 PROCEDURE — 87040 BLOOD CULTURE FOR BACTERIA: CPT | Performed by: FAMILY MEDICINE

## 2024-10-31 PROCEDURE — 80069 RENAL FUNCTION PANEL: CPT | Performed by: INTERNAL MEDICINE

## 2024-10-31 PROCEDURE — 25010000002 VANCOMYCIN 5 G RECONSTITUTED SOLUTION: Performed by: PHYSICIAN ASSISTANT

## 2024-10-31 PROCEDURE — 86140 C-REACTIVE PROTEIN: CPT | Performed by: PHYSICIAN ASSISTANT

## 2024-10-31 PROCEDURE — 87641 MR-STAPH DNA AMP PROBE: CPT | Performed by: FAMILY MEDICINE

## 2024-10-31 PROCEDURE — 25010000002 ACETAMINOPHEN 10 MG/ML SOLUTION: Performed by: PHYSICIAN ASSISTANT

## 2024-10-31 PROCEDURE — 25810000003 SODIUM CHLORIDE 0.9 % SOLUTION: Performed by: PHYSICIAN ASSISTANT

## 2024-10-31 PROCEDURE — 71260 CT THORAX DX C+: CPT

## 2024-10-31 PROCEDURE — 25010000002 CEFTRIAXONE PER 250 MG: Performed by: FAMILY MEDICINE

## 2024-10-31 PROCEDURE — 25010000002 LORAZEPAM PER 2 MG: Performed by: PHYSICIAN ASSISTANT

## 2024-10-31 PROCEDURE — 82803 BLOOD GASES ANY COMBINATION: CPT

## 2024-10-31 PROCEDURE — 25010000002 HYDROMORPHONE 1 MG/ML SOLUTION: Performed by: STUDENT IN AN ORGANIZED HEALTH CARE EDUCATION/TRAINING PROGRAM

## 2024-10-31 RX ORDER — LORAZEPAM 2 MG/ML
0.5 INJECTION INTRAMUSCULAR ONCE AS NEEDED
Status: COMPLETED | OUTPATIENT
Start: 2024-10-31 | End: 2024-10-31

## 2024-10-31 RX ORDER — LORAZEPAM 2 MG/ML
0.25 INJECTION INTRAMUSCULAR ONCE
Status: COMPLETED | OUTPATIENT
Start: 2024-10-31 | End: 2024-10-31

## 2024-10-31 RX ORDER — VANCOMYCIN/0.9 % SOD CHLORIDE 1.5G/250ML
20 PLASTIC BAG, INJECTION (ML) INTRAVENOUS ONCE
Status: COMPLETED | OUTPATIENT
Start: 2024-10-31 | End: 2024-11-01

## 2024-10-31 RX ORDER — BUDESONIDE 0.5 MG/2ML
0.5 INHALANT ORAL
Status: DISCONTINUED | OUTPATIENT
Start: 2024-10-31 | End: 2024-11-01 | Stop reason: HOSPADM

## 2024-10-31 RX ORDER — IOPAMIDOL 755 MG/ML
100 INJECTION, SOLUTION INTRAVASCULAR
Status: COMPLETED | OUTPATIENT
Start: 2024-10-31 | End: 2024-10-31

## 2024-10-31 RX ORDER — IPRATROPIUM BROMIDE AND ALBUTEROL SULFATE 2.5; .5 MG/3ML; MG/3ML
3 SOLUTION RESPIRATORY (INHALATION) EVERY 4 HOURS PRN
Status: DISCONTINUED | OUTPATIENT
Start: 2024-10-31 | End: 2024-11-01 | Stop reason: HOSPADM

## 2024-10-31 RX ORDER — ARFORMOTEROL TARTRATE 15 UG/2ML
15 SOLUTION RESPIRATORY (INHALATION)
Status: DISCONTINUED | OUTPATIENT
Start: 2024-10-31 | End: 2024-11-01 | Stop reason: HOSPADM

## 2024-10-31 RX ORDER — ACETAMINOPHEN 10 MG/ML
1000 INJECTION, SOLUTION INTRAVENOUS ONCE
Status: COMPLETED | OUTPATIENT
Start: 2024-10-31 | End: 2024-10-31

## 2024-10-31 RX ADMIN — BUDESONIDE 0.5 MG: 0.5 SUSPENSION RESPIRATORY (INHALATION) at 10:24

## 2024-10-31 RX ADMIN — LORAZEPAM 0.25 MG: 2 INJECTION INTRAMUSCULAR; INTRAVENOUS at 05:55

## 2024-10-31 RX ADMIN — ARFORMOTEROL TARTRATE 15 MCG: 15 SOLUTION RESPIRATORY (INHALATION) at 19:46

## 2024-10-31 RX ADMIN — BUDESONIDE 0.5 MG: 0.5 SUSPENSION RESPIRATORY (INHALATION) at 19:46

## 2024-10-31 RX ADMIN — LORAZEPAM 0.5 MG: 2 INJECTION INTRAMUSCULAR; INTRAVENOUS at 05:23

## 2024-10-31 RX ADMIN — VANCOMYCIN HYDROCHLORIDE 1500 MG: 5 INJECTION, POWDER, LYOPHILIZED, FOR SOLUTION INTRAVENOUS at 22:32

## 2024-10-31 RX ADMIN — ARFORMOTEROL TARTRATE 15 MCG: 15 SOLUTION RESPIRATORY (INHALATION) at 10:24

## 2024-10-31 RX ADMIN — CEFTRIAXONE SODIUM 2000 MG: 2 INJECTION, POWDER, FOR SOLUTION INTRAMUSCULAR; INTRAVENOUS at 09:17

## 2024-10-31 RX ADMIN — IOPAMIDOL 90 ML: 755 INJECTION, SOLUTION INTRAVENOUS at 18:03

## 2024-10-31 RX ADMIN — LIDOCAINE 3 PATCH: 4 PATCH TOPICAL at 09:17

## 2024-10-31 RX ADMIN — HYDROMORPHONE HYDROCHLORIDE 0.25 MG: 1 INJECTION, SOLUTION INTRAMUSCULAR; INTRAVENOUS; SUBCUTANEOUS at 21:31

## 2024-10-31 RX ADMIN — ACETAMINOPHEN 1000 MG: 10 INJECTION INTRAVENOUS at 21:30

## 2024-10-31 RX ADMIN — Medication 10 ML: at 21:34

## 2024-10-31 RX ADMIN — Medication 10 ML: at 09:17

## 2024-10-31 NOTE — PLAN OF CARE
Goal Outcome Evaluation:  Plan of Care Reviewed With: patient        Progress: declining  Outcome Evaluation: patient confused throughout shift. patient became very agitated and combative towards end of shift, PA notified and at bedside. One time dose of IV ativan given and an additional dose given after family arrived. cont. pulse ox on when tolerated by patient. safety sitter remains at bedside. patient unable to take any of his oral medications overnight due to altered mental status. 20 G IV started for possible CT with contrast. Patient's family at bedside and understand plan of care. frequent rounding ongoing. call light within reach.

## 2024-10-31 NOTE — PROGRESS NOTES
Respiratory Therapist Broncho-Pulmonary Hygiene Progress Note      Patient Name:  Nelson Wang  YOB: 1945    Nelson Wang meets the qualification for Level 2 of the Bronco-Pulmonary Hygiene Protocol. This was based on my daily patient assessment and includes review of chest x-ray results, cough ability and quality, oxygenation, secretions or risk for secretion development and patient mobility.     Broncho-Pulmonary Hygiene Assessment:    Level of Movement: Low level of mobility  Lethargic uncoorperative    Breath Sounds: Diminished and/or coarse rhonchi    Cough: Ineffective, weak or not cough efforts    Chest X-Ray: Mild consolidation and/or atelectasis.    Sputum Productions: None or small amount of thin or watery secretions with effective cough    History and Physical: None    SpO2 to Oxygen Need: greater than 92% on room air or  less than 3L nasal canula    Current SpO2 is: 94 on 2LNC    Based on this information, I have completed the following interventions: Aerobika with bronchodialtor medication or TID      Electronically signed by Mariola Lawrence, RT Student, 10/31/24, 2:04 PM EDT.

## 2024-10-31 NOTE — PROGRESS NOTES
[facility]     Psychiatric Progress Note    Patient Name: Nelson Wang  : 1945  MRN: [id number]  Primary Care Physician:  Domingo Bravo MD  Date of admission: 10/27/2024    Subjective   Subjective     Patient seen and chart reviewed, discussed with staff.    Chief Complaint: psychosis        HPI:     Sleeping but arousable. Says very little and difficult to understand.  Agitated requiring Lorazepam early AM.   Confused and unable to answer questions concernng orientation.      Consider increase of Risperdal but NPO for possible aspiration.       Objective   Objective     Vitals:   Temp:  [97.7 °F (36.5 °C)-98.2 °F (36.8 °C)] 97.8 °F (36.6 °C)  Heart Rate:  [75-89] 89  Resp:  [8-18] 17  BP: (106-147)/(49-82) 114/77  Flow (L/min) (Oxygen Therapy):  [2-3] 3          Mental Status Exam:      Appearance:   sleeping, no acute distress on awakening but restless and dosoriented  Reliability:   poor  Eye Contact:   good  Concentration/Focus:    poor  Behaviors:    restless when awake and can be combative  Memory :    Unable to assess but noted to be confused    Speech:    minimla, low tone  Language:   appropriate  Mood :    dysthymic  Affect:    flat  Thought process:    confused  Thought Content:    no SI/HI, reported AVH and paranoria  Insight:   limited  Judgement:    impaired requiring extra meds      Result Review    Result Review:  I have personally reviewed the results from the time of this admission to 10/31/2024 17:44 EDT and agree with these findings:  []  Laboratory  []  Microbiology  []  Radiology  []  EKG/Telemetry   []  Cardiology/Vascular   []  Pathology  []  Old records  []  Other:  Most notable findings include:     Lab Results (last 24 hours)       Procedure Component Value Units Date/Time    Blood Culture - Blood, Hand, Digit Right [[id number]] Collected: 10/31/24 0901    Specimen: Blood from Hand, Digit Right Updated: 10/31/24 0905    Blood Culture - Blood, Hand, Right [[id number]]  "Collected: 10/31/24 0859    Specimen: Blood from Hand, Right Updated: 10/31/24 0905    Procalcitonin [[id number]]  (Abnormal) Collected: 10/31/24 0802    Specimen: Blood from Hand, Right Updated: 10/31/24 0901     Procalcitonin 0.39 ng/mL     Narrative:      As a Marker for Sepsis (Non-Neonates):    1. <0.5 ng/mL represents a low risk of severe sepsis and/or septic shock.  2. >2 ng/mL represents a high risk of severe sepsis and/or septic shock.    As a Marker for Lower Respiratory Tract Infections that require antibiotic therapy:    PCT on Admission    Antibiotic Therapy       6-12 Hrs later    >0.5                Strongly Recommended  >0.25 - <0.5        Recommended  0.1 - 0.25          Discouraged              Remeasure/reassess PCT  <0.1                Strongly Discouraged     Remeasure/reassess PCT    As 28 day mortality risk marker: \"Change in Procalcitonin Result\" (>80% or <=80%) if Day 0 (or Day 1) and Day 4 values are available. Refer to http://www.[email or web address]-pct-calculator.com    Change in PCT <=80%  A decrease of PCT levels below or equal to 80% defines a positive change in PCT test result representing a higher risk for 28-day all-cause mortality of patients diagnosed with severe sepsis for septic shock.    Change in PCT >80%  A decrease of PCT levels of more than 80% defines a negative change in PCT result representing a lower risk for 28-day all-cause mortality of patients diagnosed with severe sepsis or septic shock.    This test is Prognostic not Diagnostic, if elevated correlate with clinical findings before administering antibiotic treatment.        Magnesium [[id number]]  (Normal) Collected: 10/31/24 0802    Specimen: Blood from Hand, Right Updated: 10/31/24 0830     Magnesium 1.9 mg/dL     Renal Function Panel [[id number]]  (Abnormal) Collected: 10/31/24 0802    Specimen: Blood from Hand, Right Updated: 10/31/24 0830     Glucose 117 mg/dL      BUN 21 mg/dL      Creatinine 4.24 mg/dL      Sodium 132 mmol/L "      Potassium 4.4 mmol/L      Chloride 95 mmol/L      CO2 23.6 mmol/L      Calcium 8.9 mg/dL      Albumin 3.2 g/dL      Phosphorus 4.0 mg/dL      Anion Gap 13.4 mmol/L      BUN/Creatinine Ratio 5.0     eGFR 13.6 mL/min/1.73      Comment: <15 Indicative of kidney failure       Narrative:      GFR Normal >60  Chronic Kidney Disease <60  Kidney Failure <15    The GFR formula is only valid for adults with stable renal function between ages 18 and 70.    Blood Gas, Arterial - [[id number]]  (Abnormal) Collected: 10/31/24 0824    Specimen: Arterial Blood Updated: 10/31/24 0826     Site Left Radial     Maverick's Test Positive     pH, Arterial 7.313 pH units      pCO2, Arterial 51.5 mm Hg      pO2, Arterial 73.2 mm Hg      HCO3, Arterial 26.2 mmol/L      Base Excess, Arterial -0.4 mmol/L      Comment: Serial Number: [id number]:  [id number]        O2 Saturation, Arterial 92.8 %      Hemoglobin, Blood Gas 9.4 g/dL      Hematocrit, Blood Gas 28.0 %      Barometric Pressure for Blood Gas 744.4000 mmHg      Modality Cannula     Flow Rate 2.0000 lpm      Hemodilution No    CBC (No Diff) [[id number]]  (Abnormal) Collected: 10/31/24 0802    Specimen: Blood from Hand, Right Updated: 10/31/24 0808     WBC 6.40 10*3/mm3      RBC 2.85 10*6/mm3      Hemoglobin 8.1 g/dL      Hematocrit 26.8 %      MCV 94.0 fL      MCH 28.4 pg      MCHC 30.2 g/dL      RDW 15.8 %      RDW-SD 54.4 fl      MPV 9.5 fL      Platelets 176 10*3/mm3                 Medications:   arformoterol, 15 mcg, Nebulization, BID - RT  atorvastatin, 40 mg, Oral, Nightly  budesonide, 0.5 mg, Nebulization, BID - RT  [Held by provider] carvedilol, 12.5 mg, Oral, Q12H  cefTRIAXone, 2,000 mg, Intravenous, Q24H  [Held by provider] cloNIDine, 0.1 mg, Oral, Q8H  Iohexol, 500 mL, Oral, Once  levothyroxine, 175 mcg, Oral, Q AM  Lidocaine, 3 patch, Transdermal, Q24H  pantoprazole, 40 mg, Oral, BID  pregabalin, 25 mg, Oral, Daily  risperiDONE, 0.5 mg, Oral, Q12H  sevelamer, 1,600 mg, Oral,  TID With Meals  sodium chloride, 10 mL, Intravenous, Q12H          Assessment / Plan       Active Hospital Problems:  Active Hospital Problems    Diagnosis     **AMS (altered mental status)     Hallucinations     LUQ pain     Abdominal pain     ESRD on dialysis     Essential hypertension     Type 2 diabetes mellitus without complication        Plan:     Increase Risperdal once takng meds orally to 1 mg BID  Will follow and make med changes as clinically indicated       Disposition:  .    Part of this note may be an electronic transcription/translation of spoken language to printed text using the Dragon dictation system.         Electronically signed by Emeka Vines MD, 10/31/24, 5:44 PM EDT.

## 2024-10-31 NOTE — CONSULTS
Pulmonary / Critical Care Consult Note      Patient Name: Nelson Wang  : 1945  MRN: [id number]  Primary Care Physician:  Domingo Bravo MD  Referring Physician: No ref. provider found  Date of admission: 10/27/2024    Subjective   Subjective     Reason for Consult/ Chief Complaint: Hypoxemia, concern for aspiration    HPI:  Nelson Wang is a 79 y.o. male with past medical history of ESRD on home dialysis, night terrors, chronic thrombocytopenia, recent history of L3 compression fracture with degenerative changes throughout lumbar spine, and former smoker who presented to the emergency department on 10/27/2024 for altered mental status, hallucinations and recurring abdominal pain.  Patient was then admitted to hospitalist service for encephalopathy and left upper quadrant abdominal pain.  Since admission, patient has continued to have ongoing complaints of abdominal pain, GI was consulted and EGD was completed.  Gastritis was noted and biopsy.  No ulcerations or bleeding noted.  Patient has continued to have worsening altered mental status.  Overnight, patient became combative and agitated towards staff, safety sitter was placed at bedside.  Received one-time dose of Ativan.  Patient now requiring 2 L per nasal cannula to maintain SpO2 greater than 90%.  AM CXR with mild bibasilar atelectasis.  Concern for possible aspiration event overnight noted.  Pulmonology was then consulted for further evaluation.  Upon exam, patient was noted to be resting in bed on 2 L nasal cannula.  Medical history was provided by daughter present at bedside.  Patient remains altered.  Daughter reports patient has continued to have uncontrollable pain with worsening mental status at home over the last couple weeks.  Patient has tried and failed multiple pain medications which often cause worsening mental status.  Patient had been continuing home dialysis without difficulty.  Audible rhonchi are noted at bedside with  intermittent congested cough.  Patient is not able to produce any sputum.  Remains afebrile.      Review of Systems:  Unable to obtain due to mental status    Personal History     Past Medical History:   Diagnosis Date    Abnormal stress test     Anemia     IRON INFUSIONS WITH DIALYSIS    Anesthesia     WITH HIP REPLACEMENT DAUGHTER BELIEVES HAD SVT 2000    Ankle pain, right     Anxiety and depression     CKD (chronic kidney disease), stage IV     DIALYSIS [id number] SHILEY IN RIGHT CHEST    Diabetes     Gout     High cholesterol     History of peritoneal dialysis     HL (hearing loss)     Hyperkalemia     Hypertension     Hypothyroidism     Insomnia     Night terrors     Psoriasis     Psoriasis     Sleep walking     Thrombocytopenia     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    Vitiligo        Past Surgical History:   Procedure Laterality Date    ANKLE SURGERY Right 11/1990    APPENDECTOMY N/A 1954    ARTERIOVENOUS FISTULA/SHUNT SURGERY Left 07/16/2024    Procedure: Creation of left arm arteriovenous fistula;  Surgeon: Moses Mir MD;  Location: [facility] MAIN OR;  Service: Vascular;  Laterality: Left;    BRONCHOSCOPY N/A 03/01/2024    Procedure: BRONCHOSCOPY WITH BAL AND WASHINGS;  Surgeon: Sandra Espinal MD;  Location: [facility] ENDOSCOPY;  Service: Pulmonary;  Laterality: N/A;  PNEUMONIA    BRONCHOSCOPY N/A 08/30/2024    Procedure: BRONCHOSCOPY WITH BALS AND WASHINGS;  Surgeon: Sandra Espinal MD;  Location: [facility] ENDOSCOPY;  Service: Pulmonary;  Laterality: N/A;  MUCOUS PLUGGING    CARDIAC CATHETERIZATION N/A 05/29/2024    Procedure: Left Heart Cath with possible coronary angioplasty;  Surgeon: Stephan Nichols MD;  Location: [facility] CATH INVASIVE LOCATION;  Service: Cardiology;  Laterality: N/A;    COLONOSCOPY N/A 06/2022    [facility]    ENDOSCOPY N/A 10/28/2024    Procedure: ESOPHAGOGASTRODUODENOSCOPY INSERTION OF LIGHTED INSTRUMENT TO VIEW ESOPHAGUS, STOMACH AND SMALL INTESTINE;  Surgeon:  Kingsley Peraza MD;  Location: [facility] ENDOSCOPY;  Service: Gastroenterology;  Laterality: N/A;  Gastritis    HIP BIPOLAR REPLACEMENT Right 01/2000    INSERTION HEMODIALYSIS CATHETER Left 04/09/2024    Procedure: HEMODIALYSIS CATHETER INSERTION;  Surgeon: Jose Berry MD;  Location: [facility] MAIN OR;  Service: General;  Laterality: Left;    INSERTION HEMODIALYSIS CATHETER N/A 04/12/2024    Procedure: Tunneled hemodialysis catheter insertion;  Surgeon: Enrique Vinson MD;  Location: [facility] OR;  Service: Vascular;  Laterality: N/A;    INSERTION PERITONEAL DIALYSIS CATHETER N/A 03/27/2023    Procedure: LAPAROSCOPIC INSERTION PERITONEAL DIALYSIS CATHETER, LAPAROSCOPIC OMENTOPEXY WITH LYSIS OF ADHESIONS;  Surgeon: Jose Berry MD;  Location: [facility] OR;  Service: General;  Laterality: N/A;    INSERTION PERITONEAL DIALYSIS CATHETER Left 07/23/2023    Procedure: REVISION OF PERITONEAL DIALYSIS CATHETER;  Surgeon: Radha Oreilly MD;  Location: [facility] OR;  Service: General;  Laterality: Left;    REMOVAL PERITONEAL DIALYSIS CATHETER N/A 04/09/2024    Procedure: REMOVAL PERITONEAL DIALYSIS CATHETER;  Surgeon: Jose Berry MD;  Location: [facility] OR;  Service: General;  Laterality: N/A;    RENAL BIOPSY Left 07/15/2022    UPPER GASTROINTESTINAL ENDOSCOPY      WRIST SURGERY      UNSURE WHICH SIDE DAUGHTER REPORTS HAD SEVERE  CUT FROM WINDOW AND THEY HAD TO DO RECONSTRUCTIVE SURGERY WITH VESSELS AND NERVES       Family History: family history includes Cancer (age of onset: 35) in his sister; Colon cancer (age of onset: 77) in his sister; Diabetes in his brother, mother, and sister; Heart disease in his father and paternal uncle; Sleep apnea in his daughter and paternal aunt. Otherwise pertinent FHx was reviewed and not pertinent to current issue.    Social History:  reports that he quit smoking about 24 years ago. His smoking use included cigarettes. He started smoking  about 34 years ago. He has a 10 pack-year smoking history. He has been exposed to tobacco smoke. He has never used smokeless tobacco. He reports current alcohol use. He reports that he does not use drugs.    Home Medications:  Budeson-Glycopyrrol-Formoterol, Insulin Glargine, Levothyroxine Sodium, Methoxy PEG-Epoetin Beta, Risankizumab-rzaa, allopurinol, amitriptyline, apixaban, atorvastatin, carvedilol, cloNIDine, lidocaine-prilocaine, midodrine, pantoprazole, sertraline, sevelamer, torsemide, and traZODone    Allergies:  No Known Allergies    Objective    Objective     Vitals:   Temp:  [97.4 °F (36.3 °C)-98.2 °F (36.8 °C)] 97.8 °F (36.6 °C)  Heart Rate:  [72-81] 78  Resp:  [8-18] 14  BP: (121-194)/() 121/49  Flow (L/min) (Oxygen Therapy):  [2] 2    Physical Exam:  Vital Signs Reviewed   General: Frail, chronically ill-appearing, elderly male, NAD, lying in bed.    HEENT:  PERRL, EOMI.  OP, nares clear, no sinus tenderness  Neck:  Supple, no JVD, no thyromegaly  Lymph: no axillary, cervical, supraclavicular lymphadenopathy noted bilaterally  Chest:  good aeration, rhonchi to auscultation bilaterally, no work of breathing noted 2 L NC  CV: RRR, no MGR, pulses 2+, equal.  Abd:  Soft, NT, ND, + BS, no HSM  EXT:  no clubbing, no cyanosis, no edema  Neuro:  A&Ox3, CN grossly intact, no focal deficits.  Skin: No rashes or lesions noted      Result Review    Result Review:  I have personally reviewed the results from the time of this admission to 10/31/2024 10:46 EDT and agree with these findings:  [x]  Laboratory  [x]  Microbiology  [x]  Radiology  [x]  EKG/Telemetry   []  Cardiology/Vascular   []  Pathology  []  Old records  []  Other:  Most notable findings include:         Lab 10/31/24  0802 10/30/24  0436 10/29/24  1137 10/29/24  0304 10/28/24  0433 10/27/24  0418   WBC 6.40 6.52  --  6.36 6.18 7.70   HEMOGLOBIN 8.1* 8.0* 8.2* 6.8* 7.4* 8.0*   HEMATOCRIT 26.8* 26.2* 27.0* 22.3* 24.6* 26.0*   PLATELETS 176 191   --  197 219 211   SODIUM 132* 130*  --  130* 127* 130*   POTASSIUM 4.4 4.3  --  4.1 3.7 3.2*   CHLORIDE 95* 92*  --  94* 87* 87*   CO2 23.6 26.6  --  27.3 28.9 31.3*   BUN 21 34*  --  25* 37* 29*   CREATININE 4.24* 5.82*  --  4.40* 6.14* 4.96*   GLUCOSE 117* 159*  --  209* 132* 155*   CALCIUM 8.9 8.8  --  8.4* 9.0 9.1   PHOSPHORUS 4.0 3.5  --  3.4 4.1  --    TOTAL PROTEIN  --  6.8  --  6.2 6.9 7.4   ALBUMIN 3.2* 3.3*  --  2.9* 3.2* 3.5   GLOBULIN  --   --   --   --   --  3.9     XR Chest 1 View    Result Date: 10/31/2024  XR CHEST 1 VW Date of Exam: 10/31/2024 8:10 AM EDT Indication: Shortness of breath Comparison: 10/27/2024 Findings: Right double-lumen catheter is unchanged in position. There are low lung volumes with poor inspiration and mild atelectasis of the lung bases, greatest on the left. There is stable cardiomegaly.     Impression: Impression: Poor inspiration with mild bibasilar atelectasis, greatest on the left. Electronically Signed: Lois Balbuena MD  10/31/2024 8:42 AM EDT  Workstation ID: [id number]     Assessment & Plan   Assessment / Plan     Active Hospital Problems:  Active Hospital Problems    Diagnosis     **AMS (altered mental status)     Hallucinations     LUQ pain     Abdominal pain     ESRD on dialysis     Essential hypertension     Type 2 diabetes mellitus without complication      Impression:   Acute hypoxic respiratory failure  Concern for aspiration pneumonia  Restrictive lung disease  Bronchiectasis  ESRD on HD  Chronic thrombocytopenia  L3 compression fracture  Altered mental status  Metabolic encephalopathy/toxic encephalopathy  Anemia  Hyponatremia  Hyperphosphatemia  History of tobacco abuse     Plan:   -Continue to wean O2 to maintain SpO2 greater than 90%.  Does not wear O2 at baseline.  -AM CXR with mild bibasilar atelectasis.  Concern for aspiration noted.  -CT of abdomen and chest with contrast pending  -Continue ceftriaxone x 5 days for pneumonia  -Start [place]  and as needed DuoNebs  -Start bronchopulmonary hygiene.    -Aspiration precautions.  Elevate head of bed.  -Nephrology on board.  Appreciate assistance.  HD timing per them.  -Trend electrolytes and renal panel.  Place electrolytes as needed.  -Continue to trend H&H.  Transfuse for hemoglobin less than 7.  -Psychiatry on board.  Appreciate assistance.  -Continue Risperdal twice daily  -PT/OT on board.  Appreciate assistance.    Labs, microbiology, radiology, medications, and provider notes personally reviewed.  Discussed with primary services and bedside RN.     I, Dr. Scottie Valentin, have spent more than 50% of the total time managing the patient in this encounter today.  This included personally reviewing all pertinent labs, imaging, microbiology and documentation. Also discussing the case with the patient and any available family, the admitting physician and any available ancillary staff.    Thank you for this consult and allowing me to participate in the care of Mr. Wang    Electronically signed by BECKY Dubois, 10/31/24, 10:46 AM EDT.  Electronically signed by Scottie Valentin MD, 10/31/24, 1:10 PM EDT.

## 2024-10-31 NOTE — SIGNIFICANT NOTE
10/31/24 1336   OTHER   Discipline occupational therapist   Rehab Time/Intention   Session Not Performed other (see comments)  (Unable to purposefully participate)

## 2024-10-31 NOTE — PROGRESS NOTES
[facility]     Nephrology Progress Note      Patient Name: Nelson Wang  : 1945  MRN: [id number]  Primary Care Physician:  Domingo Bravo MD  Date of admission: 10/27/2024    Subjective   Subjective     Interval History:  He is very somnolent this AM, appears comfortable  Sitter at bedside  Had ativan overnight   Not alert enough to answer questions.    Objective   Objective     Vitals:   Temp:  [97.4 °F (36.3 °C)-98.2 °F (36.8 °C)] 97.8 °F (36.6 °C)  Heart Rate:  [66-81] 79  Resp:  [12-18] 12  BP: (121-194)/() 121/49  Flow (L/min) (Oxygen Therapy):  [2] 2  Physical Exam:   Constitutional: somnolent, in bed.    Eyes: sclerae anicteric, no conjunctival injection   HENT: mucous membranes moist   Neck: Supple, no thyromegaly, no lymphadenopathy, trachea midline, No JVD   Respiratory: Clear to auscultation bilaterally, nonlabored respirations.   Cardiovascular: RRR, no murmurs, rubs, or gallops.   Gastrointestinal: Positive bowel sounds, soft, nontender, nondistended   Musculoskeletal: No edema, no clubbing or cyanosis, left upper extremity AV fistula, patent.  Right IJ TDC.   Neurologic: Moving extremities.   Skin: warm and dry, vitiligo/psoriasis.      Result Review    Result Reviewed:  I have personally reviewed the results from the time of this admission to 10/31/2024 08:32 EDT and agree with these findings:  [x]  Laboratory  []  Microbiology  [x]  Radiology  []  EKG/Telemetry   []  Cardiology/Vascular   []  Pathology  [x]  Old records  []  Other:        Lab 10/31/24  0802 10/30/24  0436 10/29/24  0304 10/28/24  0433 10/27/24  0418   SODIUM 132* 130* 130* 127* 130*   POTASSIUM 4.4 4.3 4.1 3.7 3.2*   CHLORIDE 95* 92* 94* 87* 87*   CO2 23.6 26.6 27.3 28.9 31.3*   BUN 21 34* 25* 37* 29*   CREATININE 4.24* 5.82* 4.40* 6.14* 4.96*   GLUCOSE 117* 159* 209* 132* 155*   EGFR 13.6* 9.3* 13.0* 8.7* 11.3*   ANION GAP 13.4 11.4 8.7 11.1 11.7   MAGNESIUM 1.9 1.9 1.9 1.8 1.8   PHOSPHORUS 4.0 3.5 3.4 4.1   --            Assessment & Plan   Assessment / Plan       Active Hospital Problems:  Active Hospital Problems    Diagnosis     **AMS (altered mental status)     Hallucinations     LUQ pain     Abdominal pain     ESRD on dialysis     Essential hypertension     Type 2 diabetes mellitus without complication        Assessment and Plan:    1.  ESRD:  Patient was on home hemodialysis receiving treatment 5 days a week.  Has left upper extremity AV fistula that is in the process of being used, still with right IJ TDC.  Continue m/w/f in hospital.       2.  Anemia: On Mircera protocol at HD unit.  S/p transfusion.     3.  Low K- Improved, monitor.  magnesium okay.     5.  Hypertension: Blood pressure is on the higher side.  Better after dialysis.  Having trouble taking his medications.  Medications are being adjusted.  2D echo showed mild LVH with grade 1 diastolic dysfunction.     6.  History of hypothyroidism on Synthroid, per primary.     7.  Hyponatremia: Mild, needs 1800 cc p.o. fluid restriction per day.       8.  Hyperphosphatemia: Continue phosphate binder and low phosphorus diet.     9.  Confusion- Will defer to primary.

## 2024-10-31 NOTE — PROGRESS NOTES
[facility]   Hospitalist Progress Note  Date: 10/31/2024  Patient Name: Nelson Wang  : 1945  MRN: [id number]  Date of admission: 10/27/2024      Subjective   Subjective     Chief complaint: Altered mental status, hallucinations    Summary:  79-year-old male with ESRD on home dialysis, dyslipidemia, hypertension, hypothyroidism, history of night terrors, chronic thrombocytopenia, anxiety, depression, recent history of L3 compression fracture, with degenerative changes through the lumbar spine, diabetes, history of alcohol use in excess, ex-smoker, hospitalized on 10/27/2024 for chief complaint of altered mental status and hallucinations, prior to hospitalization was having several medication changes to determine the best medications to manage symptoms.  Held several medications, nephrology consulted, patient has elevated troponin which is chronic and related to renal disease.  Degree of troponin elevation with negative delta is not indicative of cardiac issue.  Ongoing abdominal pain complaints, GI consulted, planning EGD and scope.  Psychiatry consulted, recommending risperidone, not initiated given prolonged QT.  Trial of 0.125 mg performed, did not respond.  Ongoing pain control, difficulty managing, added fentanyl patch and Lyrica.  Status post EGD, 10/28/2024, found to have gastritis.  Pulmonary consulted with hypoxemia and concern for aspiration.    Interval follow-up: Patient seen and examined, this morning was seen to be lethargic, with gurgling breath sounds, coughing periodically to clear his throat.  He was given IV Ativan earlier for combative agitated behavior earlier in the morning, safety sitter still at bedside.  He perked up some, found to be hypoxemic, placed on oxygen, sats improved.  Creatinine 4.24, BUN 21, potassium 4.4, sodium 132, white blood cell count 6000, hemoglobin 8.1, chest x-ray showed poor inspiration with mild basilar atelectasis mostly on the left, there is initial  concerns of aspiration but not sure if he did aspirate.  Covering empirically.  Holding oral medications given the patient's inability to take oral medications.  CT imaging still pending.    Review of systems:  Unable to obtain review of systems due to altered mental status.      Objective   Objective     Vitals:   Temp:  [97.4 °F (36.3 °C)-98.2 °F (36.8 °C)] 97.8 °F (36.6 °C)  Heart Rate:  [72-89] 89  Resp:  [8-18] 17  BP: (106-147)/(49-82) 106/51  Flow (L/min) (Oxygen Therapy):  [2] 2  Physical Exam   Constitutional: Lethargic, somewhat obtunded, difficult to arouse  Eyes: Pupils equal, sclerae anicteric, no conjunctival injection  HENT: NCAT, mucous membranes moist  Neck: Supple, full range of motion  Respiratory: Diminished coarse breath sounds with no rhonchi or wheezing, no crackles  Cardiovascular: RRR, no murmurs, rubs, or gallops, palpable pedal pulses bilaterally  Gastrointestinal: Positive bowel sounds, soft, nontender, distended  Musculoskeletal: No bilateral ankle edema, no clubbing or cyanosis to extremities  Psychiatric: Unable to further assess  Neurologic: Disoriented unable to further assess  Skin: No rashes visible on exposed skin   Result Review    Result Review:  I have personally reviewed the pertinent results from the past 24 hours to 10/31/2024 14:23 EDT and agree with these findings:  [x]  Laboratory   CBC          10/29/2024    03:04 10/29/2024    11:37 10/30/2024    04:36 10/31/2024    08:02   CBC   WBC 6.36   6.52  6.40    RBC 2.37   2.83  2.85    Hemoglobin 6.8  8.2  8.0  8.1    Hematocrit 22.3  27.0  26.2  26.8    MCV 94.1   92.6  94.0    MCH 28.7   28.3  28.4    MCHC 30.5   30.5  30.2    RDW 16.1   15.9  15.8    Platelets 197   191  176      BMP          10/29/2024    03:04 10/30/2024    04:36 10/31/2024    08:02   BMP   BUN 25  34  21    Creatinine 4.40  5.82  4.24    Sodium 130  130  132    Potassium 4.1  4.3  4.4    Chloride 94  92  95    CO2 27.3  26.6  23.6    Calcium 8.4  8.8   8.9      LIVER FUNCTION TESTS:      Lab 10/31/24  0802 10/30/24  0436 10/29/24  0304 10/28/24  0433 10/27/24  0418   TOTAL PROTEIN  --  6.8 6.2 6.9 7.4   ALBUMIN 3.2* 3.3* 2.9* 3.2* 3.5   GLOBULIN  --   --   --   --  3.9   ALT (SGPT)  --  <5 <5 5 5   AST (SGOT)  --  8 8 9 10   BILIRUBIN  --  0.9 0.7 0.9 1.0   INDIRECT BILIRUBIN  --  0.4 0.3 0.5  --    BILIRUBIN DIRECT  --  0.5* 0.4* 0.4*  --    ALK PHOS  --  69 65 71 78       [x]  Microbiology   Microbiology Results (last 10 days)       ** No results found for the last 240 hours. **              [x]  Radiology XR Chest 1 View    Result Date: 10/31/2024  Impression: Poor inspiration with mild bibasilar atelectasis, greatest on the left. Electronically Signed: Lois Balbuena MD  10/31/2024 8:42 AM EDT  Workstation ID: [id number]    XR Shoulder 2+ View Left    Result Date: 10/28/2024  Mild AC joint arthropathy. No acute findings in the shoulder. Electronically Signed: Cristian Tiwari MD  10/28/2024 10:08 PM EDT  Workstation ID: [id number]    XR Chest 1 View    Result Date: 10/27/2024  Stable cardiac enlargement with mild vascular congestion. Electronically Signed: Cristian Tiwari MD  10/27/2024 4:12 AM EDT  Workstation ID: [id number]       [x]  EKG/Telemetry   ECG 12 Lead Dyspnea   Preliminary Result   HEART RATE=78  bpm   RR Muxiaats=652  ms   OK Scogwnre=714  ms   P Horizontal Axis=-23  deg   P Front Axis=84  deg   QRSD Interval=91  ms   QT Ajrvktau=399  ms   QLiV=934  ms   QRS Axis=-30  deg   T Wave Axis=-2  deg   - BORDERLINE ECG -   Sinus rhythm   Left axis deviation   Abnormal R-wave progression, late transition   Borderline T abnormalities, diffuse leads   Date and Time of Study:2024-10-31 08:23:55      ECG 12 Lead QT Measurement   Preliminary Result   HEART RATE=66  bpm   RR Ljfreueb=831  ms   OK Interval=68  ms   P Horizontal Axis=-23  deg   P Front Axis=0  deg   QRSD Interval=95  ms   QT Wukhzmul=535  ms   PXdQ=527  ms   QRS Axis=0  deg   T Wave Axis=5  deg   -  ABNORMAL ECG -   Sinus rhythm   Short AZ interval   Anteroseptal infarct, age indeterminate   ST elevation, consider inferior injury   Date and Time of Study:2024-10-30 08:36:57      ECG 12 Lead QT Measurement   Final Result   HEART RATE=72  bpm   RR Kbnprbpc=370  ms   AZ Muevtbsb=072  ms   P Horizontal Axis=-3  deg   P Front Axis=41  deg   QRSD Interval=90  ms   QT Xeemimpx=334  ms   LKrX=687  ms   QRS Axis=-32  deg   T Wave Axis=3  deg   - ABNORMAL ECG -   Sinus rhythm   Borderline  prolonged AZ interval   Abnormal R-wave progression, late transition   Inferior  infarct, old   When compared with ECG of 28-Oct-2024 16:03:12,   New or worsened ischemia or infarction   New conduction abnormality   Significant repolarization change   Electronically Signed By: Shiraz Barajas ([facility]) 2024-10-29 18:45:23   Date and Time of Study:2024-10-29 07:56:03      ECG 12 Lead QT Measurement   Final Result   HEART RATE=63  bpm   RR Mfphvwgm=714  ms   AZ Ifanvbju=924  ms   P Horizontal Axis=16  deg   P Front Axis=24  deg   QRSD Interval=93  ms   QT Hlvulroq=800  ms   JAnW=975  ms   QRS Axis=-21  deg   T Wave Axis=-4  deg   - ABNORMAL ECG -   Sinus rhythm   Borderline  left axis deviation   Abnormal R-wave progression, late transition   Borderline  T abnormalities, inferior leads   Prolonged QT interval   When compared with ECG of 28-Oct-2024 12:17:38,   No significant change   Electronically Signed By: Warren Aguilar ([facility]) 2024-10-29 20:57:28   Date and Time of Study:2024-10-28 16:03:12      ECG 12 Lead Chest Pain   Final Result   HEART RATE=63  bpm   RR Xmcqucgk=052  ms   AZ Interval=  ms   P Horizontal Axis=-7  deg   P Front Axis=Invalid  deg   QRSD Interval=93  ms   QT Aemhmmxi=897  ms   AFkG=486  ms   QRS Axis=-27  deg   T Wave Axis=1  deg   - ABNORMAL ECG -   Sinus rhythm   Abnormal R-wave progression, late transition   Probable  inferior infarct, age indeterminate   Prolonged QT interval   When compared with ECG of 28-Oct-2024  12:17:38,   Significant change in rhythm: previously sinus   Electronically Signed By: Warren Aguilar ([facility]) 2024-10-29 20:57:54   Date and Time of Study:2024-10-28 16:01:57      ECG 12 Lead QT Measurement   Final Result   HEART RATE=62  bpm   RR Qxclmima=267  ms   WI Tknoedur=528  ms   P Horizontal Axis=  deg   P Front Axis=35  deg   QRSD Interval=94  ms   QT Nuwlpwet=738  ms   LPuG=606  ms   QRS Axis=-24  deg   T Wave Axis=-5  deg   - ABNORMAL ECG -   Sinus rhythm   Borderline  left axis deviation   Anteroseptal  infarct, age indeterminate   When compared with ECG of 27-Oct-2024 04:01:14,   Significant change in rhythm   Significant repolarization change   Electronically Signed By: Warren Aguilar ([facility]) 2024-10-29 20:58:01   Date and Time of Study:2024-10-28 12:17:38      ECG 12 Lead ED Triage Standing Order; Weak / Dizzy / AMS   Preliminary Result   HEART RATE=77  bpm   RR Tccwzlfk=054  ms   WI Whjvaadf=110  ms   P Horizontal Axis=10  deg   P Front Axis=-57  deg   QRSD Interval=92  ms   QT Wmnlbufb=849  ms   EAyT=519  ms   QRS Axis=-30  deg   T Wave Axis=34  deg   - ABNORMAL ECG -   Sinus or ectopic atrial rhythm   Left axis deviation   Probable anterior infarct, age indeterminate   Prolonged QT interval   Date and Time of Study:2024-10-27 04:01:14      Telemetry Scan   Final Result          []  Cardiology/Vascular   []  Pathology  [x]  Old records  []  Other:    Assessment & Plan   Assessment / Plan     Assessment/Plan:    Assessment:  Hallucinations  Abdominal pain  Anxiety  Depression  Prolonged QTc  ESRD on HD  Dyslipidemia  Essential hypertension  Hypothyroidism  Night terrors  Chronic thrombocytopenia  L3 compression fracture  Diabetes mellitus  Elevated troponin noncardiac related; decreased renal clearance    Plan:  Labs and imaging reviewed  Stat ABG  Stat chest x-ray  Start empiric antibiotics with ceftriaxone 2 g IV daily  Pulmonary consulted discussed Dr. Valentin, recommendations appreciated; start  Brovana and Pulmicort nebs twice daily  Remove fentanyl patch  Holding all oral medications until he is able to take oral medication  CT chest abdomen pelvis with IV contrast, discussed with Dr. Peraza and Dr. Castro, will add additional dialysis tomorrow  When accessing port obtain blood culture  Continue lidocaine patch 12 mcg every 72 hours  Lidocaine patch for local pain control  Continue Risperdal to 0.25 mg p.o. twice daily if he is able to take this  GI recommendations appreciated  Hold clonidine and Coreg as well as Lyrica until blood pressures improved  Continue Lipitor 40 mg nightly  Continue levothyroxine 175 mcg daily  Continue Protonix 40 mg twice daily  Continue sevelamer 1600 mg 3 times a day  Monitor routine EKGs for QTc measurement  PT/OT  Nephrology recommendations appreciated  Dialysis per nephrology  A.m. labs  Full code  DVT prophylaxis with SCDs  Clinical course dictate further management  Discussed with nurse at the bedside  VTE Prophylaxis:  Pharmacologic & mechanical VTE prophylaxis orders are present.        CODE STATUS:   Code Status (Patient has no pulse and is not breathing): CPR (Attempt to Resuscitate)  Medical Interventions (Patient has pulse or is breathing): Full Support        Electronically signed by Saúl Vargas MD, 10/31/2024, 14:23 EDT.    Portions of this documentation were transcribed electronically from a voice recognition software.  I confirm all data accurately represents the service(s) I performed at today's visit.

## 2024-10-31 NOTE — PLAN OF CARE
Goal Outcome Evaluation:           Progress: no change  Outcome Evaluation: VSS. Saftey sitter at bedside. More alert thru shift, but remains confused. Cooperative and sometimes follows commands. No PO meds given this shift per Dr Vargas. Lung sounds diminished and rhonchii. In creased to 3L n/c. No nonverbal indicaters of pain noted at this time.

## 2024-11-01 ENCOUNTER — HOSPITAL ENCOUNTER (INPATIENT)
Facility: HOSPITAL | Age: 79
LOS: 17 days | Discharge: HOME | DRG: 551 | End: 2024-11-18
Attending: STUDENT IN AN ORGANIZED HEALTH CARE EDUCATION/TRAINING PROGRAM | Admitting: STUDENT IN AN ORGANIZED HEALTH CARE EDUCATION/TRAINING PROGRAM
Payer: MEDICARE

## 2024-11-01 ENCOUNTER — APPOINTMENT (OUTPATIENT)
Dept: NEPHROLOGY | Facility: HOSPITAL | Age: 79
End: 2024-11-01
Payer: MEDICARE

## 2024-11-01 ENCOUNTER — APPOINTMENT (OUTPATIENT)
Dept: MRI IMAGING | Facility: HOSPITAL | Age: 79
DRG: 551 | End: 2024-11-01
Payer: MEDICARE

## 2024-11-01 DIAGNOSIS — K21.9 GERD WITHOUT ESOPHAGITIS: ICD-10-CM

## 2024-11-01 DIAGNOSIS — M1A.00X0 IDIOPATHIC CHRONIC GOUT WITHOUT TOPHUS, UNSPECIFIED SITE: ICD-10-CM

## 2024-11-01 DIAGNOSIS — N18.6 ESRD ON DIALYSIS: ICD-10-CM

## 2024-11-01 DIAGNOSIS — Z99.2 ESRD ON DIALYSIS: ICD-10-CM

## 2024-11-01 DIAGNOSIS — R52 INTRACTABLE PAIN: ICD-10-CM

## 2024-11-01 DIAGNOSIS — G93.41 METABOLIC ENCEPHALOPATHY: ICD-10-CM

## 2024-11-01 DIAGNOSIS — N18.6 END STAGE RENAL DISEASE: ICD-10-CM

## 2024-11-01 DIAGNOSIS — M46.46 DISCITIS OF LUMBAR REGION: Primary | ICD-10-CM

## 2024-11-01 DIAGNOSIS — J96.11 CHRONIC RESPIRATORY FAILURE WITH HYPOXIA: ICD-10-CM

## 2024-11-01 DIAGNOSIS — J69.0 ASPIRATION PNEUMONITIS: ICD-10-CM

## 2024-11-01 PROBLEM — M46.40 DISCITIS: Status: ACTIVE | Noted: 2024-11-01

## 2024-11-01 LAB
ALBUMIN SERPL-MCNC: 2.8 G/DL (ref 3.5–5.2)
ALBUMIN/GLOB SERPL: 0.7 G/DL
ALP SERPL-CCNC: 64 U/L (ref 39–117)
ALT SERPL W P-5'-P-CCNC: <5 U/L (ref 1–41)
ANION GAP SERPL CALCULATED.3IONS-SCNC: 12.4 MMOL/L (ref 5–15)
AST SERPL-CCNC: 9 U/L (ref 1–40)
BACTERIA BLD CULT: ABNORMAL
BILIRUB SERPL-MCNC: 0.6 MG/DL (ref 0–1.2)
BOTTLE TYPE: ABNORMAL
BUN SERPL-MCNC: 32 MG/DL (ref 8–23)
BUN/CREAT SERPL: 5.2 (ref 7–25)
CALCIUM SPEC-SCNC: 8.5 MG/DL (ref 8.6–10.5)
CHLORIDE SERPL-SCNC: 98 MMOL/L (ref 98–107)
CO2 SERPL-SCNC: 22.6 MMOL/L (ref 22–29)
CREAT SERPL-MCNC: 6.21 MG/DL (ref 0.76–1.27)
CRP SERPL-MCNC: 24.89 MG/DL (ref 0–0.5)
D-LACTATE SERPL-SCNC: 0.9 MMOL/L (ref 0.5–2)
DEPRECATED RDW RBC AUTO: 49.1 FL (ref 37–54)
EGFRCR SERPLBLD CKD-EPI 2021: 8.6 ML/MIN/1.73
ERYTHROCYTE [DISTWIDTH] IN BLOOD BY AUTOMATED COUNT: 14.5 % (ref 12.3–15.4)
ERYTHROCYTE [SEDIMENTATION RATE] IN BLOOD: 105 MM/HR (ref 0–20)
GLOBULIN UR ELPH-MCNC: 3.9 GM/DL
GLUCOSE BLDC GLUCOMTR-MCNC: 109 MG/DL (ref 70–130)
GLUCOSE BLDC GLUCOMTR-MCNC: 117 MG/DL (ref 70–130)
GLUCOSE BLDC GLUCOMTR-MCNC: 117 MG/DL (ref 70–130)
GLUCOSE BLDC GLUCOMTR-MCNC: 92 MG/DL (ref 70–130)
GLUCOSE BLDC GLUCOMTR-MCNC: 93 MG/DL (ref 70–130)
GLUCOSE SERPL-MCNC: 108 MG/DL (ref 65–99)
HCT VFR BLD AUTO: 27 % (ref 37.5–51)
HGB BLD-MCNC: 8.2 G/DL (ref 13–17.7)
LIPASE SERPL-CCNC: 9 U/L (ref 13–60)
MAGNESIUM SERPL-MCNC: 2.1 MG/DL (ref 1.6–2.4)
MCH RBC QN AUTO: 28.1 PG (ref 26.6–33)
MCHC RBC AUTO-ENTMCNC: 30.4 G/DL (ref 31.5–35.7)
MCV RBC AUTO: 92.5 FL (ref 79–97)
PLATELET # BLD AUTO: 163 10*3/MM3 (ref 140–450)
PMV BLD AUTO: 9.5 FL (ref 6–12)
POTASSIUM SERPL-SCNC: 4.8 MMOL/L (ref 3.5–5.2)
PROT SERPL-MCNC: 6.7 G/DL (ref 6–8.5)
RBC # BLD AUTO: 2.92 10*6/MM3 (ref 4.14–5.8)
SODIUM SERPL-SCNC: 133 MMOL/L (ref 136–145)
VANCOMYCIN SERPL-MCNC: 15.1 MCG/ML (ref 5–40)
WBC NRBC COR # BLD AUTO: 8.81 10*3/MM3 (ref 3.4–10.8)

## 2024-11-01 PROCEDURE — 94640 AIRWAY INHALATION TREATMENT: CPT

## 2024-11-01 PROCEDURE — 99221 1ST HOSP IP/OBS SF/LOW 40: CPT

## 2024-11-01 PROCEDURE — 25010000002 HYDROMORPHONE PER 4 MG: Performed by: STUDENT IN AN ORGANIZED HEALTH CARE EDUCATION/TRAINING PROGRAM

## 2024-11-01 PROCEDURE — 94799 UNLISTED PULMONARY SVC/PX: CPT

## 2024-11-01 PROCEDURE — 25010000002 VANCOMYCIN 750 MG RECONSTITUTED SOLUTION 1 EACH VIAL: Performed by: NURSE PRACTITIONER

## 2024-11-01 PROCEDURE — 25810000003 SODIUM CHLORIDE 0.9 % SOLUTION 250 ML FLEX CONT: Performed by: NURSE PRACTITIONER

## 2024-11-01 PROCEDURE — 85027 COMPLETE CBC AUTOMATED: CPT | Performed by: NURSE PRACTITIONER

## 2024-11-01 PROCEDURE — 5A1D70Z PERFORMANCE OF URINARY FILTRATION, INTERMITTENT, LESS THAN 6 HOURS PER DAY: ICD-10-PCS | Performed by: INTERNAL MEDICINE

## 2024-11-01 PROCEDURE — 94761 N-INVAS EAR/PLS OXIMETRY MLT: CPT

## 2024-11-01 PROCEDURE — 80053 COMPREHEN METABOLIC PANEL: CPT | Performed by: NURSE PRACTITIONER

## 2024-11-01 PROCEDURE — 99223 1ST HOSP IP/OBS HIGH 75: CPT | Performed by: INTERNAL MEDICINE

## 2024-11-01 PROCEDURE — 85652 RBC SED RATE AUTOMATED: CPT | Performed by: NURSE PRACTITIONER

## 2024-11-01 PROCEDURE — 83690 ASSAY OF LIPASE: CPT | Performed by: NURSE PRACTITIONER

## 2024-11-01 PROCEDURE — 83605 ASSAY OF LACTIC ACID: CPT | Performed by: NURSE PRACTITIONER

## 2024-11-01 PROCEDURE — 94664 DEMO&/EVAL PT USE INHALER: CPT

## 2024-11-01 PROCEDURE — 82948 REAGENT STRIP/BLOOD GLUCOSE: CPT

## 2024-11-01 PROCEDURE — 25010000002 HYDROMORPHONE PER 4 MG: Performed by: NURSE PRACTITIONER

## 2024-11-01 PROCEDURE — 25010000002 OLANZAPINE 10 MG RECONSTITUTED SOLUTION: Performed by: SPECIALIST

## 2024-11-01 PROCEDURE — 25010000002 LORAZEPAM PER 2 MG: Performed by: NURSE PRACTITIONER

## 2024-11-01 PROCEDURE — 86140 C-REACTIVE PROTEIN: CPT | Performed by: NURSE PRACTITIONER

## 2024-11-01 PROCEDURE — 25010000002 OLANZAPINE 10 MG RECONSTITUTED SOLUTION: Performed by: STUDENT IN AN ORGANIZED HEALTH CARE EDUCATION/TRAINING PROGRAM

## 2024-11-01 PROCEDURE — 83735 ASSAY OF MAGNESIUM: CPT | Performed by: NURSE PRACTITIONER

## 2024-11-01 PROCEDURE — 25010000002 HEPARIN (PORCINE) PER 1000 UNITS: Performed by: STUDENT IN AN ORGANIZED HEALTH CARE EDUCATION/TRAINING PROGRAM

## 2024-11-01 PROCEDURE — 25010000002 CEFTRIAXONE PER 250 MG: Performed by: NURSE PRACTITIONER

## 2024-11-01 PROCEDURE — 80202 ASSAY OF VANCOMYCIN: CPT | Performed by: NURSE PRACTITIONER

## 2024-11-01 RX ORDER — TORSEMIDE 100 MG/1
100 TABLET ORAL DAILY
Status: DISCONTINUED | OUTPATIENT
Start: 2024-11-01 | End: 2024-11-14

## 2024-11-01 RX ORDER — SODIUM CHLORIDE 9 MG/ML
40 INJECTION, SOLUTION INTRAVENOUS AS NEEDED
Status: DISCONTINUED | OUTPATIENT
Start: 2024-11-01 | End: 2024-11-18 | Stop reason: HOSPADM

## 2024-11-01 RX ORDER — SODIUM CHLORIDE 0.9 % (FLUSH) 0.9 %
10 SYRINGE (ML) INJECTION EVERY 12 HOURS SCHEDULED
Status: DISCONTINUED | OUTPATIENT
Start: 2024-11-01 | End: 2024-11-15

## 2024-11-01 RX ORDER — SODIUM CHLORIDE 0.9 % (FLUSH) 0.9 %
10 SYRINGE (ML) INJECTION AS NEEDED
Status: DISCONTINUED | OUTPATIENT
Start: 2024-11-01 | End: 2024-11-18 | Stop reason: HOSPADM

## 2024-11-01 RX ORDER — HYDROMORPHONE HYDROCHLORIDE 1 MG/ML
0.25 INJECTION, SOLUTION INTRAMUSCULAR; INTRAVENOUS; SUBCUTANEOUS
Status: DISCONTINUED | OUTPATIENT
Start: 2024-11-01 | End: 2024-11-03

## 2024-11-01 RX ORDER — ACETAMINOPHEN 325 MG/1
650 TABLET ORAL EVERY 4 HOURS PRN
Status: DISCONTINUED | OUTPATIENT
Start: 2024-11-01 | End: 2024-11-04

## 2024-11-01 RX ORDER — CARVEDILOL 3.12 MG/1
3.12 TABLET ORAL EVERY MORNING
Status: DISCONTINUED | OUTPATIENT
Start: 2024-11-01 | End: 2024-11-03

## 2024-11-01 RX ORDER — SERTRALINE HYDROCHLORIDE 100 MG/1
100 TABLET, FILM COATED ORAL NIGHTLY
Status: DISCONTINUED | OUTPATIENT
Start: 2024-11-01 | End: 2024-11-18 | Stop reason: HOSPADM

## 2024-11-01 RX ORDER — ACETAMINOPHEN 160 MG/5ML
650 SOLUTION ORAL EVERY 4 HOURS PRN
Status: DISCONTINUED | OUTPATIENT
Start: 2024-11-01 | End: 2024-11-04

## 2024-11-01 RX ORDER — TRAZODONE HYDROCHLORIDE 50 MG/1
50 TABLET, FILM COATED ORAL NIGHTLY
Status: DISCONTINUED | OUTPATIENT
Start: 2024-11-01 | End: 2024-11-01

## 2024-11-01 RX ORDER — BISACODYL 10 MG
10 SUPPOSITORY, RECTAL RECTAL DAILY PRN
Status: DISCONTINUED | OUTPATIENT
Start: 2024-11-01 | End: 2024-11-18 | Stop reason: HOSPADM

## 2024-11-01 RX ORDER — ONDANSETRON 2 MG/ML
4 INJECTION INTRAMUSCULAR; INTRAVENOUS EVERY 6 HOURS PRN
Status: DISCONTINUED | OUTPATIENT
Start: 2024-11-01 | End: 2024-11-18 | Stop reason: HOSPADM

## 2024-11-01 RX ORDER — CLONIDINE HYDROCHLORIDE 0.1 MG/1
0.2 TABLET ORAL EVERY 8 HOURS PRN
Status: DISCONTINUED | OUTPATIENT
Start: 2024-11-01 | End: 2024-11-14

## 2024-11-01 RX ORDER — ARFORMOTEROL TARTRATE 15 UG/2ML
15 SOLUTION RESPIRATORY (INHALATION)
Status: DISCONTINUED | OUTPATIENT
Start: 2024-11-01 | End: 2024-11-18 | Stop reason: HOSPADM

## 2024-11-01 RX ORDER — POLYETHYLENE GLYCOL 3350 17 G/17G
17 POWDER, FOR SOLUTION ORAL DAILY PRN
Status: DISCONTINUED | OUTPATIENT
Start: 2024-11-01 | End: 2024-11-18 | Stop reason: HOSPADM

## 2024-11-01 RX ORDER — OLANZAPINE 10 MG/2ML
5 INJECTION, POWDER, FOR SOLUTION INTRAMUSCULAR EVERY 8 HOURS PRN
Status: DISCONTINUED | OUTPATIENT
Start: 2024-11-01 | End: 2024-11-18 | Stop reason: HOSPADM

## 2024-11-01 RX ORDER — NICOTINE POLACRILEX 4 MG
15 LOZENGE BUCCAL
Status: DISCONTINUED | OUTPATIENT
Start: 2024-11-01 | End: 2024-11-18 | Stop reason: HOSPADM

## 2024-11-01 RX ORDER — OLANZAPINE 10 MG/2ML
5 INJECTION, POWDER, FOR SOLUTION INTRAMUSCULAR ONCE
Status: COMPLETED | OUTPATIENT
Start: 2024-11-01 | End: 2024-11-01

## 2024-11-01 RX ORDER — ATORVASTATIN CALCIUM 20 MG/1
40 TABLET, FILM COATED ORAL DAILY
Status: DISCONTINUED | OUTPATIENT
Start: 2024-11-01 | End: 2024-11-18 | Stop reason: HOSPADM

## 2024-11-01 RX ORDER — RISPERIDONE 1 MG/1
1 TABLET ORAL 2 TIMES DAILY
Status: DISCONTINUED | OUTPATIENT
Start: 2024-11-01 | End: 2024-11-03

## 2024-11-01 RX ORDER — BISACODYL 5 MG/1
5 TABLET, DELAYED RELEASE ORAL DAILY PRN
Status: DISCONTINUED | OUTPATIENT
Start: 2024-11-01 | End: 2024-11-18 | Stop reason: HOSPADM

## 2024-11-01 RX ORDER — FAMOTIDINE 20 MG/1
20 TABLET, FILM COATED ORAL 2 TIMES DAILY PRN
Status: DISCONTINUED | OUTPATIENT
Start: 2024-11-01 | End: 2024-11-18 | Stop reason: HOSPADM

## 2024-11-01 RX ORDER — FENTANYL 25 UG/1
1 PATCH TRANSDERMAL
Status: DISCONTINUED | OUTPATIENT
Start: 2024-11-02 | End: 2024-11-03

## 2024-11-01 RX ORDER — LEVOTHYROXINE SODIUM 175 UG/1
175 TABLET ORAL
Status: DISCONTINUED | OUTPATIENT
Start: 2024-11-01 | End: 2024-11-05

## 2024-11-01 RX ORDER — IBUPROFEN 600 MG/1
1 TABLET ORAL
Status: DISCONTINUED | OUTPATIENT
Start: 2024-11-01 | End: 2024-11-18 | Stop reason: HOSPADM

## 2024-11-01 RX ORDER — PANTOPRAZOLE SODIUM 40 MG/1
40 TABLET, DELAYED RELEASE ORAL 2 TIMES DAILY
Status: DISCONTINUED | OUTPATIENT
Start: 2024-11-01 | End: 2024-11-02

## 2024-11-01 RX ORDER — ACETAMINOPHEN 650 MG/1
650 SUPPOSITORY RECTAL EVERY 4 HOURS PRN
Status: DISCONTINUED | OUTPATIENT
Start: 2024-11-01 | End: 2024-11-04

## 2024-11-01 RX ORDER — SEVELAMER CARBONATE 800 MG/1
1600 TABLET, FILM COATED ORAL 2 TIMES DAILY
Status: DISPENSED | OUTPATIENT
Start: 2024-11-01 | End: 2024-11-02

## 2024-11-01 RX ORDER — IPRATROPIUM BROMIDE AND ALBUTEROL SULFATE 2.5; .5 MG/3ML; MG/3ML
3 SOLUTION RESPIRATORY (INHALATION) EVERY 6 HOURS PRN
Status: DISCONTINUED | OUTPATIENT
Start: 2024-11-01 | End: 2024-11-18 | Stop reason: HOSPADM

## 2024-11-01 RX ORDER — HEPARIN SODIUM 5000 [USP'U]/ML
5000 INJECTION, SOLUTION INTRAVENOUS; SUBCUTANEOUS EVERY 8 HOURS SCHEDULED
Status: DISCONTINUED | OUTPATIENT
Start: 2024-11-01 | End: 2024-11-18 | Stop reason: HOSPADM

## 2024-11-01 RX ORDER — HYDROMORPHONE HYDROCHLORIDE 1 MG/ML
0.5 INJECTION, SOLUTION INTRAMUSCULAR; INTRAVENOUS; SUBCUTANEOUS ONCE
Status: COMPLETED | OUTPATIENT
Start: 2024-11-01 | End: 2024-11-01

## 2024-11-01 RX ORDER — LORAZEPAM 2 MG/ML
0.5 INJECTION INTRAMUSCULAR ONCE
Status: COMPLETED | OUTPATIENT
Start: 2024-11-01 | End: 2024-11-01

## 2024-11-01 RX ORDER — NITROGLYCERIN 0.4 MG/1
0.4 TABLET SUBLINGUAL
Status: DISCONTINUED | OUTPATIENT
Start: 2024-11-01 | End: 2024-11-18 | Stop reason: HOSPADM

## 2024-11-01 RX ORDER — DEXTROSE MONOHYDRATE 25 G/50ML
25 INJECTION, SOLUTION INTRAVENOUS
Status: DISCONTINUED | OUTPATIENT
Start: 2024-11-01 | End: 2024-11-18 | Stop reason: HOSPADM

## 2024-11-01 RX ORDER — BUDESONIDE 0.5 MG/2ML
0.5 INHALANT ORAL
Status: DISCONTINUED | OUTPATIENT
Start: 2024-11-01 | End: 2024-11-18 | Stop reason: HOSPADM

## 2024-11-01 RX ORDER — AMOXICILLIN 250 MG
2 CAPSULE ORAL 2 TIMES DAILY PRN
Status: DISCONTINUED | OUTPATIENT
Start: 2024-11-01 | End: 2024-11-05

## 2024-11-01 RX ORDER — AMITRIPTYLINE HYDROCHLORIDE 10 MG/1
10 TABLET ORAL NIGHTLY
Status: DISCONTINUED | OUTPATIENT
Start: 2024-11-01 | End: 2024-11-07

## 2024-11-01 RX ADMIN — BUDESONIDE 0.5 MG: 0.5 INHALANT ORAL at 20:32

## 2024-11-01 RX ADMIN — BUDESONIDE 0.5 MG: 0.5 INHALANT ORAL at 06:58

## 2024-11-01 RX ADMIN — OLANZAPINE 5 MG: 10 INJECTION, POWDER, LYOPHILIZED, FOR SOLUTION INTRAMUSCULAR at 23:37

## 2024-11-01 RX ADMIN — VANCOMYCIN HYDROCHLORIDE 750 MG: 750 INJECTION, POWDER, LYOPHILIZED, FOR SOLUTION INTRAVENOUS at 18:07

## 2024-11-01 RX ADMIN — OLANZAPINE 5 MG: 10 INJECTION, POWDER, LYOPHILIZED, FOR SOLUTION INTRAMUSCULAR at 08:45

## 2024-11-01 RX ADMIN — HYDROMORPHONE HYDROCHLORIDE 0.5 MG: 1 INJECTION, SOLUTION INTRAMUSCULAR; INTRAVENOUS; SUBCUTANEOUS at 02:39

## 2024-11-01 RX ADMIN — LORAZEPAM 0.5 MG: 2 INJECTION, SOLUTION INTRAMUSCULAR; INTRAVENOUS at 02:28

## 2024-11-01 RX ADMIN — CEFTRIAXONE SODIUM 1000 MG: 1 INJECTION, POWDER, FOR SOLUTION INTRAMUSCULAR; INTRAVENOUS at 08:47

## 2024-11-01 RX ADMIN — HYDROMORPHONE HYDROCHLORIDE 0.25 MG: 1 INJECTION, SOLUTION INTRAMUSCULAR; INTRAVENOUS; SUBCUTANEOUS at 23:38

## 2024-11-01 RX ADMIN — ARFORMOTEROL TARTRATE 15 MCG: 15 SOLUTION RESPIRATORY (INHALATION) at 20:31

## 2024-11-01 RX ADMIN — Medication 10 ML: at 08:53

## 2024-11-01 RX ADMIN — OLANZAPINE 5 MG: 10 INJECTION, POWDER, LYOPHILIZED, FOR SOLUTION INTRAMUSCULAR at 16:12

## 2024-11-01 RX ADMIN — HEPARIN SODIUM 5000 UNITS: 5000 INJECTION INTRAVENOUS; SUBCUTANEOUS at 18:06

## 2024-11-01 RX ADMIN — HYDROMORPHONE HYDROCHLORIDE 0.25 MG: 1 INJECTION, SOLUTION INTRAMUSCULAR; INTRAVENOUS; SUBCUTANEOUS at 12:28

## 2024-11-01 NOTE — DISCHARGE SUMMARY
[facility]    HOSPITALIST  DISCHARGE SUMMARY    Patient Name: Nelson Wang  : 1945  MRN: [id number]    Date of Admission: 10/27/2024  Date of Discharge:  10/31/24    Primary Care Physician: Domingo Bravo MD    Consults       Date and Time Order Name Status Description    10/31/2024  8:09 AM Inpatient Pulmonology Consult Completed     10/27/2024  7:20 AM Inpatient Nephrology Consult Completed     10/27/2024  6:09 AM Inpatient Gastroenterology Consult Completed     10/27/2024  6:09 AM Inpatient Psychiatrist Consult      10/27/2024  5:38 AM Hospitalist (on-call MD unless specified)              Active and Resolved Hospital Problems:  Active Hospital Problems    Diagnosis POA    **AMS (altered mental status) [R41.82] Yes    Hallucinations [R44.3] Unknown    LUQ pain [R10.12] Unknown    Abdominal pain [R10.9] Yes    ESRD on dialysis [N18.6, Z99.2] Not Applicable    Essential hypertension [I10] Yes    Type 2 diabetes mellitus without complication [E11.9] Yes      Resolved Hospital Problems   No resolved problems to display.       Hospital Course     Hospital Course:  Nelson Wang is a 79 y.o. male with ESRD on home dialysis, dyslipidemia, hypertension, hypothyroidism, history of night terrors, chronic thrombocytopenia, anxiety, depression, recent history of L3 compression fracture, with degenerative changes through the lumbar spine, diabetes, history of alcohol use in excess, ex-smoker, hospitalized on 10/27/2024 for chief complaint of altered mental status and hallucinations, prior to hospitalization was having several medication changes to determine the best medications to manage symptoms. Held several medications, nephrology consulted, patient has elevated troponin which is chronic and related to renal disease. Degree of troponin elevation with negative delta is not indicative of cardiac issue. Ongoing abdominal pain complaints, GI consulted, planning EGD and scope. Psychiatry consulted,  recommending risperidone, not initiated given prolonged QT. Trial of 0.125 mg performed, did not respond. Ongoing pain control, difficulty managing, added fentanyl patch and Lyrica. Status post EGD, 10/28/2024, found to have gastritis. Pulmonary consulted with hypoxemia and concern for aspiration.  CT imaging was ordered of patient's chest, abdomen, and pelvis to attempt to locate source of patient's pain.  Imaging resulted with new irregular endplate changes at T5-T6 concerning for discitis/osteomyelitis.  He also had adjacent fat stranding in the posterior mediastinum without organized or drainable fluid collection.  I discussed with patient's family.  Decision made to transfer patient to [facility].  Discussed with neurosurgery, Dr. Das as well as hospitalist advanced practice provider SKY Ye.  He was accepted to the service of Dr. Raul Coleman with [facility] medicine.  Patient will be transferred for further management of T5-T6 discitis/osteomyelitis.    Prior to transfer patient was given vancomycin and 2 g of Rocephin.      DISCHARGE Follow Up Recommendations for labs and diagnostics: Patient will need to be dialyzed tomorrow 11/1/2024 after having received contrast on 10/31/2024.  Blood cultures are pending at time of discharge.      Day of Discharge     Vital Signs:  Temp:  [97.7 °F (36.5 °C)-98.1 °F (36.7 °C)] 98.1 °F (36.7 °C)  Heart Rate:  [75-93] 91  Resp:  [8-20] 20  BP: (106-147)/(49-79) 147/63  Flow (L/min) (Oxygen Therapy):  [2-3] 3  Physical Exam:   Constitutional: Lethargic, somewhat obtunded, difficult to arouse  Eyes: Pupils equal, sclerae anicteric, no conjunctival injection  HENT: NCAT, mucous membranes moist  Neck: Supple, full range of motion  Respiratory: Diminished coarse breath sounds with no rhonchi or wheezing, no crackles  Cardiovascular: RRR, no murmurs, rubs, or gallops, palpable pedal pulses bilaterally  Gastrointestinal: Positive bowel sounds, soft, nontender,  distended  Musculoskeletal: No bilateral ankle edema, no clubbing or cyanosis to extremities  Psychiatric: Unable to further assess  Neurologic: Disoriented.  Moves all extremities independently with no focal deficits noted on limited exam due to agitation and disorientation.  Skin: No rashes visible on exposed skin.  Vitiligo.      Discharge Details        Discharge Medications        Changes to Medications        Instructions Start Date   amitriptyline 10 MG tablet  Commonly known as: ELAVIL  What changed: how much to take   10 mg, Oral, Nightly      midodrine 2.5 MG tablet  Commonly known as: PROAMATINE  What changed:   when to take this  additional instructions   2.5 mg, Oral, 3 Times Daily Before Meals, On dialysis days      pantoprazole 40 MG EC tablet  Commonly known as: PROTONIX  What changed: when to take this   40 mg, Oral, Daily      sevelamer 800 MG tablet  Commonly known as: RENVELA  What changed: when to take this   1,600 mg, Oral, 3 Times Daily With Meals      traZODone 50 MG tablet  Commonly known as: DESYREL  What changed: how much to take   50 mg, Oral, Nightly             Continue These Medications        Instructions Start Date   allopurinol 300 MG tablet  Commonly known as: ZYLOPRIM   1 tablet, Oral, Daily PRN      atorvastatin 40 MG tablet  Commonly known as: LIPITOR   40 mg, Oral, Daily      Breztri Aerosphere 160-9-4.8 MCG/ACT aerosol inhaler  Generic drug: Budeson-Glycopyrrol-Formoterol   2 puffs, Inhalation, 2 Times Daily      carvedilol 25 MG tablet  Commonly known as: COREG   25 mg, Oral, Every Morning      carvedilol 25 MG tablet  Commonly known as: COREG   25 mg, Oral, Daily PRN      cloNIDine 0.2 MG tablet  Commonly known as: CATAPRES   1 tablet, Oral, Every 8 Hours PRN      Eliquis 5 MG tablet tablet  Generic drug: apixaban   5 mg, Oral, Every 12 Hours Scheduled      Insulin Glargine 100 UNIT/ML injection pen  Commonly known as: LANTUS SOLOSTAR   10 Units, Subcutaneous, Nightly       Levothyroxine Sodium 175 MCG capsule   175 mcg, Oral, Daily      lidocaine-prilocaine 2.5-2.5 % cream  Commonly known as: EMLA   Apply 1 Application topically to the appropriate area as directed As Needed for Injection Site Pain.      MIRCERA IJ   Once As Needed      sertraline 100 MG tablet  Commonly known as: ZOLOFT   100 mg, Oral, Nightly      Skyrizi Pen 150 MG/ML solution auto-injector  Generic drug: Risankizumab-rzaa   1 dose, Subcutaneous, Take As Directed, Every 3 months      torsemide 100 MG tablet  Commonly known as: DEMADEX   100 mg, Oral, Daily               No Known Allergies    Discharge Disposition: Transfer to [facility] ([place] - External)    Diet:  Hospital:  Diet Order   Procedures    Diet: Renal; Low Potassium, Low Sodium (2-3g); Fluid Consistency: Thin (IDDSI 0)       Discharge Activity: Up with assistance only      CODE STATUS:  Code Status and Medical Interventions: CPR (Attempt to Resuscitate); Full Support   Ordered at: 10/27/24 0556     Code Status (Patient has no pulse and is not breathing):    CPR (Attempt to Resuscitate)     Medical Interventions (Patient has pulse or is breathing):    Full Support         Future Appointments   Date Time Provider Department Center   11/1/2024  8:00 AM DIALYSIS ROOM 279 [facility] DIAL [facility]   11/20/2024  9:45 AM [facility] XR FL 1 [facility] XRAY [facility]   11/25/2024  9:00 AM AMY RUSTY CT 1 [facility] ETWCT [facility]   12/31/2024  7:30 AM Armando Guallpa DPM [facility] POD ETWN [facility]   1/9/2025  9:45 AM Sandra Espinal MD [facility] PCC ETW [facility]   1/9/2025  2:00 PM Stephan Nichols MD [facility] CD EDIXE [facility]   1/10/2025 10:45 AM Domingo Bravo MD [facility] IMP ETWN [facility]           Pertinent  and/or Most Recent Results     PROCEDURES:   Upper endoscopy on 10/28/2024 revealed gastritis    LAB RESULTS:      Lab 10/31/24  0802 10/30/24  0436 10/29/24  1137 10/29/24  0304 10/28/24  0433 10/27/24  0418   WBC 6.40 6.52  --  6.36 6.18 7.70   HEMOGLOBIN 8.1* 8.0* 8.2* 6.8* 7.4* 8.0*    HEMATOCRIT 26.8* 26.2* 27.0* 22.3* 24.6* 26.0*   PLATELETS 176 191  --  197 219 211   NEUTROS ABS  --  4.37  --  4.50 4.01 5.15   IMMATURE GRANS (ABS)  --  0.02  --  0.04 0.04 0.04   LYMPHS ABS  --  1.43  --  1.01 1.30 1.52   MONOS ABS  --  0.66  --  0.75 0.75 0.96*   EOS ABS  --  0.02  --  0.03 0.06 0.01   MCV 94.0 92.6  --  94.1 92.5 94.5   SED RATE 85*  --   --   --   --   --    CRP 21.87*  --   --   --   --   --    PROCALCITONIN 0.39* 0.31*  --   --   --   --          Lab 10/31/24  0802 10/30/24  0436 10/29/24  0304 10/28/24  0433 10/27/24  0418   SODIUM 132* 130* 130* 127* 130*   POTASSIUM 4.4 4.3 4.1 3.7 3.2*   CHLORIDE 95* 92* 94* 87* 87*   CO2 23.6 26.6 27.3 28.9 31.3*   ANION GAP 13.4 11.4 8.7 11.1 11.7   BUN 21 34* 25* 37* 29*   CREATININE 4.24* 5.82* 4.40* 6.14* 4.96*   EGFR 13.6* 9.3* 13.0* 8.7* 11.3*   GLUCOSE 117* 159* 209* 132* 155*   CALCIUM 8.9 8.8 8.4* 9.0 9.1   MAGNESIUM 1.9 1.9 1.9 1.8 1.8   PHOSPHORUS 4.0 3.5 3.4 4.1  --    TSH  --   --   --  2.770  --          Lab 10/31/24  0802 10/30/24  0436 10/29/24  0304 10/28/24  0433 10/27/24  0418   TOTAL PROTEIN  --  6.8 6.2 6.9 7.4   ALBUMIN 3.2* 3.3* 2.9* 3.2* 3.5   GLOBULIN  --   --   --   --  3.9   ALT (SGPT)  --  <5 <5 5 5   AST (SGOT)  --  8 8 9 10   BILIRUBIN  --  0.9 0.7 0.9 1.0   INDIRECT BILIRUBIN  --  0.4 0.3 0.5  --    BILIRUBIN DIRECT  --  0.5* 0.4* 0.4*  --    ALK PHOS  --  69 65 71 78         Lab 10/27/24  0650 10/27/24  0418   HSTROP T 79* 86*             Lab 10/29/24  0442   ABO TYPING AB   RH TYPING Positive   ANTIBODY SCREEN Negative         Lab 10/31/24  0824   PH, ARTERIAL 7.313*   PCO2, ARTERIAL 51.5*   PO2 ART 73.2*   O2 SATURATION ART 92.8*   HCO3 ART 26.2*   BASE EXCESS ART -0.4     Brief Urine Lab Results  (Last result in the past 365 days)        Color   Clarity   Blood   Leuk Est   Nitrite   Protein   CREAT   Urine HCG        10/27/24 1417 Yellow   Cloudy   Negative   Trace   Negative   >=300 mg/dL (3+)                  Microbiology Results (last 10 days)       ** No results found for the last 240 hours. **            CT Abdomen Pelvis With Contrast    Result Date: 10/31/2024  Impression: Motion-degraded exam CT CHEST: 1. New irregular endplate changes at T5-T6 since 10/8/2024 comparison, appearance suspicious for discitis/osteomyelitis. Adjacent fat stranding in the posterior mediastinum without organized/drainable fluid collection. Consider infectious disease consultation and further assessment with MRI of thoracic spine without and with IV contrast (if possible). 2. Mild patchy and consolidative airspace opacities in the right lower lobe with small adjacent pleural effusion new from previous comparison. Differential includes atelectasis versus early developing infection. Recommend clinical correlation. 3. Cardiomegaly. Aortic and coronary atherosclerotic disease. 4. Dilated main pulmonary artery which can be seen with pulmonary arterial hypertension. 5. Fluid/debris in the mid thoracic esophagus. Recommend aspiration precautions. CT ABDOMEN/PELVIS: 1. No convincing acute intra-abdominal process. Specifically no acute GI related abnormality by CT. 2. Nonobstructing 1.1 cm stone in the right renal pelvis similar to prior. No hydronephrosis. Atrophic kidneys with multiple renal cysts related to chronic renal disease and/or dialysis. 3. Mild body wall edema. 4. Other chronic/ancillary findings as above. Findings and recommendations regarding suspected discitis osteomyelitis communicated over the phone with patient's nurse (Patricia Rios) at 8:21 p.m. 10/31/2024. Electronically Signed: Lorne Bustamante MD  10/31/2024 8:22 PM EDT  Workstation ID: [id number]    CT Chest With Contrast Diagnostic    Result Date: 10/31/2024  Impression: Motion-degraded exam CT CHEST: 1. New irregular endplate changes at T5-T6 since 10/8/2024 comparison, appearance suspicious for discitis/osteomyelitis. Adjacent fat stranding in the posterior mediastinum  without organized/drainable fluid collection. Consider infectious disease consultation and further assessment with MRI of thoracic spine without and with IV contrast (if possible). 2. Mild patchy and consolidative airspace opacities in the right lower lobe with small adjacent pleural effusion new from previous comparison. Differential includes atelectasis versus early developing infection. Recommend clinical correlation. 3. Cardiomegaly. Aortic and coronary atherosclerotic disease. 4. Dilated main pulmonary artery which can be seen with pulmonary arterial hypertension. 5. Fluid/debris in the mid thoracic esophagus. Recommend aspiration precautions. CT ABDOMEN/PELVIS: 1. No convincing acute intra-abdominal process. Specifically no acute GI related abnormality by CT. 2. Nonobstructing 1.1 cm stone in the right renal pelvis similar to prior. No hydronephrosis. Atrophic kidneys with multiple renal cysts related to chronic renal disease and/or dialysis. 3. Mild body wall edema. 4. Other chronic/ancillary findings as above. Findings and recommendations regarding suspected discitis osteomyelitis communicated over the phone with patient's nurse (Patricia Rios) at 8:21 p.m. 10/31/2024. Electronically Signed: Lorne Bustamante MD  10/31/2024 8:22 PM EDT  Workstation ID: [id number]    XR Chest 1 View    Result Date: 10/31/2024  Impression: Poor inspiration with mild bibasilar atelectasis, greatest on the left. Electronically Signed: Lois Balbuena MD  10/31/2024 8:42 AM EDT  Workstation ID: [id number]    XR Shoulder 2+ View Left    Result Date: 10/28/2024  Mild AC joint arthropathy. No acute findings in the shoulder. Electronically Signed: Cristian Tiwari MD  10/28/2024 10:08 PM EDT  Workstation ID: [id number]    XR Chest 1 View    Result Date: 10/27/2024  Stable cardiac enlargement with mild vascular congestion. Electronically Signed: Cristian Tiwari MD  10/27/2024 4:12 AM EDT  Workstation ID: [id number]      Results for orders placed  during the hospital encounter of 07/01/24    Duplex Initial Vein Mapping Hemodialysis Access Bilateral CAR    Interpretation Summary    The right cephalic vein is patent and of adequate size in the upper arm.    The right basilic vein is patent and of adequate size in the upper arm.    The left cephalic vein is patent and of adequate size in the upper arm.    The left basilic vein is patent and of adequate size in the upper arm.    The right radial artery is patent and of adequate size.    The right brachial artery is patent and of adequate size.    The left radial artery is patent and of adequate size.    The left brachial artery is patent and of adequate size.      Results for orders placed during the hospital encounter of 07/01/24    Duplex Initial Vein Mapping Hemodialysis Access Bilateral CAR    Interpretation Summary    The right cephalic vein is patent and of adequate size in the upper arm.    The right basilic vein is patent and of adequate size in the upper arm.    The left cephalic vein is patent and of adequate size in the upper arm.    The left basilic vein is patent and of adequate size in the upper arm.    The right radial artery is patent and of adequate size.    The right brachial artery is patent and of adequate size.    The left radial artery is patent and of adequate size.    The left brachial artery is patent and of adequate size.      Results for orders placed during the hospital encounter of 09/28/24    Adult Transthoracic Echo Complete W/ Cont if Necessary Per Protocol    Interpretation Summary    Left ventricular ejection fraction appears to be 56 - 60%.    Left ventricular wall thickness is consistent with mild concentric hypertrophy.    Left ventricular diastolic function is consistent with (grade I) impaired relaxation.    There is calcification of the aortic valve.No significant stenosis.      Labs Pending at Discharge:  Pending Labs       Order Current Status    Blood Culture - Blood,  Hand, Digit Right In process    Blood Culture - Blood, Hand, Right In process    MRSA Screen, PCR (Inpatient) - Swab, Nares In process              Time spent on Discharge including face to face service: 33 minutes    Electronically signed by EDSON Antony, 10/31/24, 10:10 PM EDT.

## 2024-11-01 NOTE — PROGRESS NOTES
[facility] Clinical Pharmacy Services: Vancomycin Pharmacokinetic Initial Consult Note    Nelson Wang is a 79 y.o. male who is on day 1 of pharmacy to dose vancomycin.    Indication: Bone and/or Joint Infection  Consulting Provider: Jes Ku  Planned Duration of Therapy: 5 days  Loading Dose Ordered or Given: 1500 mg on 11/1 at 2232  MRSA PCR performed: yes; Result: NEG  Culture/Source: BCx in process  Target: Dose by Levels  Pertinent Vanc Dosing History:   Other Antimicrobials: Ceftriaxone    Vitals/Labs  Ht:  ; Wt:    Temp Readings from Last 1 Encounters:   10/31/24 98.1 °F (36.7 °C) (Oral)    Estimated Creatinine Clearance: 14.7 mL/min (A) (by C-G formula based on SCr of 4.24 mg/dL (H)).  Dialysis MWF during [place] stay, previously on 5 days a week home dialysis per MD notes     Results from last 7 days   Lab Units 10/31/24  0802 10/30/24  0436 10/29/24  0304   CREATININE mg/dL 4.24* 5.82* 4.40*   WBC 10*3/mm3 6.40 6.52 6.36     Assessment/Plan:    Vancomycin Dose:  doing per levels  Will defer any levels to day clinical once more info on dialysis plans are available     Pharmacy will follow patient's kidney function and will adjust doses and obtain levels as necessary. Thank you for involving pharmacy in this patient's care. Please contact pharmacy with any questions or concerns.                           Saurabh Noriega [facility]  Clinical Pharmacist

## 2024-11-01 NOTE — CASE MANAGEMENT/SOCIAL WORK
Continued Stay Note  [facility]     Patient Name: Nelson Wang  MRN: [id number]  Today's Date: 11/1/2024    Admit Date: 11/1/2024        Discharge Plan       Row Name 11/01/24 1813       Plan    Plan Comments Pt unavailable for screen. Left message for pt's family. PARAG Masterson RN                   Discharge Codes    No documentation.                       Rudolph Easton RN

## 2024-11-01 NOTE — SIGNIFICANT NOTE
Notified by nursing staff regarding radiology report.  Patient has new irregular endplate changes at T5-T6 that were new since 10/8/2024.  The appearance was suspicious for discitis/osteomyelitis.  He also had adjacent fat stranding in the posterior mediastinum without organized or drainable fluid collection.    Discussed with patient's family.  Decision made to transfer patient to [facility].  Transfer request was initiated.  I called and discussed case with Dr. Das, he is accepted by neurosurgery and advanced practice provider with the hospitalists SKY Ye    Electronically signed by EDSON Antony, 10/31/24, 10:06 PM EDT.

## 2024-11-01 NOTE — PROGRESS NOTES
[facility] Progress Note      Patient Name: Nelson Wang  : 1945  MRN: [id number]  Primary Care Physician:  Domingo Bravo MD  Date of admission: 2024    Subjective     Interval History:   Follow-up end-stage renal disease    The patient is transferred from [facility] for evaluation for possible lumbar discitis the patient has significantly altered mental status very confused, he has been on home hemodialysis prior to admission to [facility],    Review of Systems:   Not obtainable    Objective     Vitals:   Temp:  [97.8 °F (36.6 °C)-98.5 °F (36.9 °C)] 98.5 °F (36.9 °C)  Heart Rate:  [76-93] 78  Resp:  [8-20] 18  BP: (106-147)/(51-77) 122/59  Flow (L/min) (Oxygen Therapy):  [1-3] 1    Intake/Output Summary (Last 24 hours) at 2024 0804  Last data filed at 2024 0720  Gross per 24 hour   Intake 0 ml   Output --   Net 0 ml       Physical Exam:    General Appearance: Eyes are open but the patient is not responding to any verbal stimuli, chronically ill  Skin: warm and dry, he has vitiligo and psoriasis  HEENT: Oral mucosa is dry  Neck: Right IJ tunneled dialysis  Lungs: Bilateral rhonchi, unlabored breathing  Heart: RRR, no rub  Abdomen: soft, no guarding, nondistended  : no palpable bladder  Extremities: no edema, cyanosis or clubbing, functional AV fistula in the left upper arm  Neuro: Unable to assess    Scheduled Meds:     amitriptyline, 10 mg, Oral, Nightly  arformoterol, 15 mcg, Nebulization, BID - RT  atorvastatin, 40 mg, Oral, Daily  budesonide, 0.5 mg, Nebulization, BID - RT  carvedilol, 3.125 mg, Oral, QAM  cefTRIAXone, 1,000 mg, Intravenous, Q24H  [START ON 2024] fentaNYL, 1 patch, Transdermal, Q72H   And  [START ON 2024] Check Fentanyl Patch Placement, 1 each, Does not apply, Q12H  insulin regular, 2-7 Units, Subcutaneous, Q6H  levothyroxine, 175 mcg, Oral, Q AM  pantoprazole, 40 mg, Oral, BID  sertraline, 100 mg, Oral,  Nightly  sevelamer, 1,600 mg, Oral, BID  sodium chloride, 10 mL, Intravenous, Q12H  torsemide, 100 mg, Oral, Daily  traZODone, 50 mg, Oral, Nightly  Vancomycin Pharmacy Intermittent/Pulse Dosing, , Does not apply, Daily      IV Meds:   Pharmacy to dose vancomycin,         Results Reviewed:   I have personally reviewed the results from the time of this admission to 11/1/2024 08:04 EDT     Results from last 7 days   Lab Units 11/01/24  0443 10/31/24  0802 10/30/24  0436 10/29/24  0304   SODIUM mmol/L 133* 132* 130* 130*   POTASSIUM mmol/L 4.8 4.4 4.3 4.1   CHLORIDE mmol/L 98 95* 92* 94*   CO2 mmol/L 22.6 23.6 26.6 27.3   BUN mg/dL 32* 21 34* 25*   CREATININE mg/dL 6.21* 4.24* 5.82* 4.40*   CALCIUM mg/dL 8.5* 8.9 8.8 8.4*   BILIRUBIN mg/dL 0.6  --  0.9 0.7   ALK PHOS U/L 64  --  69 65   ALT (SGPT) U/L <5  --  <5 <5   AST (SGOT) U/L 9  --  8 8   GLUCOSE mg/dL 108* 117* 159* 209*       Estimated Creatinine Clearance: 10.3 mL/min (A) (by C-G formula based on SCr of 6.21 mg/dL (H)).    Results from last 7 days   Lab Units 11/01/24  0443 10/31/24  0802 10/30/24  0436 10/29/24  0304   MAGNESIUM mg/dL 2.1 1.9 1.9 1.9   PHOSPHORUS mg/dL  --  4.0 3.5 3.4             Results from last 7 days   Lab Units 11/01/24  0444 10/31/24  0802 10/30/24  0436 10/29/24  1137 10/29/24  0304 10/28/24  0433   WBC 10*3/mm3 8.81 6.40 6.52  --  6.36 6.18   HEMOGLOBIN g/dL 8.2* 8.1* 8.0* 8.2* 6.8* 7.4*   PLATELETS 10*3/mm3 163 176 191  --  197 219             Assessment / Plan     ASSESSMENT:  End-stage renal disease patient was on home hemodialysis receiving 5 times a week treatment he has left upper arm AV fistula which is not ready for use still using the right IJ tunneled dialysis catheter his last dialysis was on 10/30/2024.  His electrolyte and volume status within acceptable  Anemia of chronic kidney disease on long-acting RONALD his hemoglobin today is 8.2  Hypertension with chronic kidney disease reasonably controlled  Hypothyroidism on  replacement therapy  Mild hyponatremia sodium is 133 associated [place] with mild volume excess  Altered mental status continue the same treatment  Concern about lumbar discitis being evaluated    PLAN:  Hemodialysis today  Continue the same treatment  Surveillance labs    I reviewed the chart and other providers notes, reviewed labs.  I discussed the case with the patient.    Thank you for involving us in the care of Nelson Wang.  Please feel free to call with any questions.    Bart Wolf MD  11/01/24  08:04 EDT    [facility]  [phone]    Please note that portions of this note were completed with a voice recognition program.

## 2024-11-01 NOTE — PROGRESS NOTES
[facility] Clinical Pharmacy Services: Vancomycin Monitoring Note    Nelson Wang is a 79 y.o. male who is on day 2 of pharmacy to dose vancomycin for bone and/or joint infection.    Previous Vancomycin Dose: Intermittent pulse dosing   Updated Cultures and Sensitivities:     Results from last 7 days   Lab Units 11/01/24  0443   VANCOMYCIN RM mcg/mL 15.10     Vitals/Labs  Ht:  ; Wt: 74.3 kg (163 lb 12.8 oz)   Temp Readings from Last 1 Encounters:   11/01/24 98.5 °F (36.9 °C) (Oral)     Estimated Creatinine Clearance: 10.3 mL/min (A) (by C-G formula based on SCr of 6.21 mg/dL (H)).   ESRD on home dialysis 5X weekly; IP schedule TBD    Results from last 7 days   Lab Units 11/01/24 0444 11/01/24  0443 10/31/24  0802 10/30/24  0436   CREATININE mg/dL  --  6.21* 4.24* 5.82*   WBC 10*3/mm3 8.81  --  6.40 6.52     Assessment/Plan  Random vancomycin level collected after initial loading dose returned at 15.1 mg/L (~6 he level). Dialysis is planned for today. Plan as below:     Current Vancomycin Dose: Vancomycin 750 mg x 1 AFTER dialysis  Next Level Date and Time: TBD pending dialysis plans + clinical course   We will continue to monitor patient changes and renal function     Thank you for involving pharmacy in this patient's care. Please contact pharmacy with any questions or concerns.       Cheyenne Gowers, [facility]  Clinical Pharmacist

## 2024-11-01 NOTE — CONSULTS
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  He is seen related to a history of agitation and hallucinations.    CHIEF COMPLAINT: None given    INFORMANT: Daughter and chart, patient is sleeping    RELIABILITY: Good    HISTORY OF PRESENT ILLNESS: The patient is a 79-year-old [place] American male admitted with discitis and metabolic encephalopathy.  Patient also suffers from aspiration pneumonia and is on renal dialysis.  He reportedly carries a diagnosis of bipolar disorder.  He was seen by  at [facility] on 10/3/2024 with confusion and agitation.  At that time he was treated with Abilify.  He is currently prescribed psychotropic medications include Risperdal Zoloft and trazodone.  Since hospitalization the patient has had some episodes of altered mental status and hallucinations.    PAST PSYCHIATRIC HISTORY: Patient reportedly has a history of bipolar disorder.  Preadmission psychiatric medications included melatonin, Risperdal, Zoloft and trazodone    PAST MEDICAL HISTORY: Significant for discitis, aspiration pneumonia, end-stage renal disease, chronic respiratory failure, hypertension, anemia, type 2 diabetes    MEDICATIONS:   Current Facility-Administered Medications   Medication Dose Route Frequency Provider Last Rate Last Admin    acetaminophen (TYLENOL) tablet 650 mg  650 mg Oral Q4H PRN Jes Ku APRN        Or    acetaminophen (TYLENOL) 160 MG/5ML oral solution 650 mg  650 mg Oral Q4H PRN Jes Ku APRN        Or    acetaminophen (TYLENOL) suppository 650 mg  650 mg Rectal Q4H PRN Jes Ku APRN        amitriptyline (ELAVIL) tablet 10 mg  10 mg Oral Nightly Fela Snyder MD        arformoterol (BROVANA) nebulizer solution 15 mcg  15 mcg Nebulization BID - RT Jes Ku APRN        atorvastatin (LIPITOR) tablet 40 mg  40 mg Oral Daily Fela Snyder MD         sennosides-docusate (PERICOLACE) 8.6-50 MG per tablet 2 tablet  2 tablet Oral BID PRN Jes Ku APRN        And    polyethylene glycol (MIRALAX) packet 17 g  17 g Oral Daily PRN Jes Ku APRN        And    bisacodyl (DULCOLAX) EC tablet 5 mg  5 mg Oral Daily PRN Jes Ku APRN        And    bisacodyl (DULCOLAX) suppository 10 mg  10 mg Rectal Daily PRN Jes Ku APRN        budesonide (PULMICORT) nebulizer solution 0.5 mg  0.5 mg Nebulization BID - RT Jes Ku APRN   0.5 mg at 11/01/24 0658    carvedilol (COREG) tablet 3.125 mg  3.125 mg Oral QAM Fela Snyder MD        cefTRIAXone (ROCEPHIN) 1,000 mg in sodium chloride 0.9 % 100 mL MBP  1,000 mg Intravenous Q24H Jes Ku APRN 200 mL/hr at 11/01/24 0847 1,000 mg at 11/01/24 0847    [START ON 11/2/2024] fentaNYL (DURAGESIC) 25 MCG/HR patch 1 patch  1 patch Transdermal Q72H Jes Ku APRN        And    [START ON 11/2/2024] Check Fentanyl Patch Placement  1 each Does not apply Q12H Jes Ku APRN        cloNIDine (CATAPRES) tablet 0.2 mg  0.2 mg Oral Q8H PRN Fela Snyder MD        dextrose (D50W) (25 g/50 mL) IV injection 25 g  25 g Intravenous Q15 Min PRN Jes Ku APRN        dextrose (GLUTOSE) oral gel 15 g  15 g Oral Q15 Min PRN Jes Ku APRN        famotidine (PEPCID) tablet 20 mg  20 mg Oral BID PRN Jes Ku APRN        glucagon (GLUCAGEN) injection 1 mg  1 mg Intramuscular Q15 Min PRN Jes Ku APRN        heparin (porcine) 5000 UNIT/ML injection 5,000 Units  5,000 Units Subcutaneous Q8H Fela Snyder MD        HYDROmorphone (DILAUDID) injection 0.25 mg  0.25 mg Intravenous Q2H PRN Fela Snyder MD   0.25 mg at 11/01/24 1228    insulin regular (humuLIN R,novoLIN R) injection 2-7 Units  2-7 Units Subcutaneous Q6H Jes Ku APRN        ipratropium-albuterol (DUO-NEB) nebulizer solution 3 mL   3 mL Nebulization Q6H PRN Fela Snyder MD        levothyroxine (SYNTHROID, LEVOTHROID) tablet 175 mcg  175 mcg Oral Q AM Fela Snyder MD        melatonin tablet 5 mg  5 mg Oral Nightly PRN Jes Ku APRN        nitroglycerin (NITROSTAT) SL tablet 0.4 mg  0.4 mg Sublingual Q5 Min PRN Jes Ku APRN        OLANZapine (zyPREXA) injection 5 mg  5 mg Intramuscular Q8H PRN Guy Dai III, MD        ondansetron (ZOFRAN) injection 4 mg  4 mg Intravenous Q6H PRN Jes Ku APRN        pantoprazole (PROTONIX) EC tablet 40 mg  40 mg Oral BID Fela Snyder MD        Pharmacy to dose vancomycin   Does not apply Continuous PRN Jes Ku APRN        sertraline (ZOLOFT) tablet 100 mg  100 mg Oral Nightly Fela Snyder MD        sevelamer (RENVELA) tablet 1,600 mg  1,600 mg Oral BID Fela Snyder MD        sodium chloride 0.9 % flush 10 mL  10 mL Intravenous Q12H Jes Ku APRN   10 mL at 11/01/24 0853    sodium chloride 0.9 % flush 10 mL  10 mL Intravenous PRN Jes Ku APRN        sodium chloride 0.9 % infusion 40 mL  40 mL Intravenous PRN Jes Ku APRN        torsemide (DEMADEX) tablet 100 mg  100 mg Oral Daily Fela Snyder MD        vancomycin 750 mg in sodium chloride 0.9 % 250 mL IVPB-VTB  10 mg/kg Intravenous Once Jes Ku APRN        Vancomycin Pharmacy Intermittent/Pulse Dosing   Does not apply Daily Jes Ku APRN             ALLERGIES: None    FAMILY HISTORY: Not obtained    SOCIAL HISTORY: The patient is a retired psychiatrist.  There is reportedly a history of alcohol abuse.    MENTAL STATUS EXAM: The patient is a somewhat ill appearing  American male.  He is sleeping soundly and cannot be awakened for interview.    ASSETS/LIABILITIES: To be assessed/multiple health issues    DIAGNOSTIC IMPRESSION: Metabolic encephalopathy, bipolar disorder unspecified by  history, medical problems as previously documented    PLAN: I will restart the patient's scheduled Risperdal and have also left orders for as needed olanzapine for agitation.  I will continue to follow with you.  I would recommend that the patient's CODE STATUS be discussed with family and that a palliative consult be considered.

## 2024-11-01 NOTE — NURSING NOTE
Stable hd run. Zyprexa mid tx for restlessness. Ineffective. Sitter remained at bs. Otoniel well. 1.5 liters net uf. Post vss 166/79 hr 83

## 2024-11-01 NOTE — SIGNIFICANT NOTE
11/01/24 1002   OTHER   Discipline physical therapist   Rehab Time/Intention   Session Not Performed other (see comments)  (Pt not appropriate for PT today per RN. Will follow up tomorrow.)   Recommendation   PT - Next Appointment 11/02/24

## 2024-11-01 NOTE — PLAN OF CARE
Patient continues to be disoriented, impulsive, and difficult to redirect. VSS, dialysis completed, 1.5 Liters removed. MRI paperwork signed and faxed. PRN medication for agitation given twice on shift.         Problem: Adult Inpatient Plan of Care  Goal: Plan of Care Review  Outcome: Progressing  Goal: Patient-Specific Goal (Individualized)  Outcome: Progressing  Goal: Absence of Hospital-Acquired Illness or Injury  Outcome: Progressing  Intervention: Identify and Manage Fall Risk  Recent Flowsheet Documentation  Taken 11/1/2024 1600 by Karl Bernal RN  Safety Promotion/Fall Prevention: patient off unit  Taken 11/1/2024 1400 by Karl Bernal RN  Safety Promotion/Fall Prevention: safety round/check completed  Taken 11/1/2024 1228 by Karl Bernal RN  Safety Promotion/Fall Prevention: safety round/check completed  Taken 11/1/2024 1000 by Karl Bernal RN  Safety Promotion/Fall Prevention: safety round/check completed  Taken 11/1/2024 0800 by Karl Bernal RN  Safety Promotion/Fall Prevention: nonskid shoes/slippers when out of bed  Intervention: Prevent Skin Injury  Recent Flowsheet Documentation  Taken 11/1/2024 1400 by Karl Bernal RN  Body Position: position changed independently  Taken 11/1/2024 1228 by Karl Bernal RN  Body Position:   side-lying   right  Taken 11/1/2024 1000 by Karl Bernal RN  Body Position:   side-lying   left  Taken 11/1/2024 0800 by Karl Bernal RN  Body Position: position changed independently  Intervention: Prevent Infection  Recent Flowsheet Documentation  Taken 11/1/2024 0800 by Karl Bernal RN  Infection Prevention: single patient room provided  Goal: Optimal Comfort and Wellbeing  Outcome: Progressing  Intervention: Provide Person-Centered Care  Recent Flowsheet Documentation  Taken 11/1/2024 0800 by Karl Bernal RN  Trust Relationship/Rapport:   care explained   reassurance provided  Goal: Readiness for  Transition of Care  Outcome: Progressing     Problem: Fall Injury Risk  Goal: Absence of Fall and Fall-Related Injury  Outcome: Progressing  Intervention: Identify and Manage Contributors  Recent Flowsheet Documentation  Taken 11/1/2024 0800 by Karl Bernal RN  Medication Review/Management: medications reviewed  Intervention: Promote Injury-Free Environment  Recent Flowsheet Documentation  Taken 11/1/2024 1600 by Karl Bernal RN  Safety Promotion/Fall Prevention: patient off unit  Taken 11/1/2024 1400 by Karl Bernal RN  Safety Promotion/Fall Prevention: safety round/check completed  Taken 11/1/2024 1228 by Karl Bernal RN  Safety Promotion/Fall Prevention: safety round/check completed  Taken 11/1/2024 1000 by Karl Bernal RN  Safety Promotion/Fall Prevention: safety round/check completed  Taken 11/1/2024 0800 by Karl Bernal RN  Safety Promotion/Fall Prevention: nonskid shoes/slippers when out of bed     Problem: Comorbidity Management  Goal: Blood Glucose Level Within Target Range  Outcome: Progressing  Intervention: Monitor and Manage Glycemia  Recent Flowsheet Documentation  Taken 11/1/2024 0800 by Karl Bernal RN  Medication Review/Management: medications reviewed  Goal: Blood Pressure in Desired Range  Outcome: Progressing  Intervention: Maintain Blood Pressure Management  Recent Flowsheet Documentation  Taken 11/1/2024 0800 by Karl Bernal RN  Medication Review/Management: medications reviewed  Goal: Maintenance of Osteoarthritis Symptom Control  Outcome: Progressing  Intervention: Maintain Osteoarthritis Symptom Control  Recent Flowsheet Documentation  Taken 11/1/2024 1400 by Karl Bernal RN  Activity Management: activity encouraged  Taken 11/1/2024 1228 by Karl Bernal RN  Activity Management: activity encouraged  Taken 11/1/2024 1000 by Karl Bernal RN  Activity Management: activity encouraged  Taken 11/1/2024 0800 by Karl Bernal  Shay, RN  Activity Management: activity encouraged  Medication Review/Management: medications reviewed     Problem: Skin Injury Risk Increased  Goal: Skin Health and Integrity  Outcome: Progressing  Intervention: Optimize Skin Protection  Recent Flowsheet Documentation  Taken 11/1/2024 1400 by Karl Bernal, RN  Activity Management: activity encouraged  Head of Bed (HOB) Positioning: HOB at 20-30 degrees  Taken 11/1/2024 1228 by Karl Bernal, RN  Activity Management: activity encouraged  Head of Bed (HOB) Positioning: HOB at 20-30 degrees  Taken 11/1/2024 1000 by Karl Bernal, RN  Activity Management: activity encouraged  Head of Bed (HOB) Positioning: HOB at 20-30 degrees  Taken 11/1/2024 0800 by Karl Bernal, RN  Activity Management: activity encouraged  Head of Bed (HOB) Positioning: HOB at 20-30 degrees   Goal Outcome Evaluation:

## 2024-11-01 NOTE — CONSULTS
Referring Provider: Christina Clark PA  [address] [place] [address]  Reason for Consultation: Concern for osteomyelitis/discitis    Subjective   History of present illness: This is a 79-year-old male with end-stage renal disease on hemodialysis, type 2 diabetes, hypertension hyperlipidemia and psoriasis who was transferred to [facility] on November 1 with concerns for thoracic discitis/osteomyelitis  He had initially presented to an outside hospital with altered mental status and hallucinations on October 27.  Prior to the hospitalization he had several medication changes which were thought to result in the above.  As part of his workup and due to ongoing GI complaints the patient had a CAT scan of the chest abdomen pelvis which was concerning for T5-T6 discitis/osteomyelitis and he was transferred to [facility] for further management.  The patient was empirically started on vancomycin and ceftriaxone.  He has remained afebrile with a normal white blood cell count.    Per the patient's family at bedside he initially started complaining of back pain about 1 month ago.  He was admitted to [facility] from September 28 through October 3 with no etiology found.  He became confused during that hospitalization and has never recovered.    Past Medical History:   Diagnosis Date    Anemia     IRON INFUSIONS WITH DIALYSIS    Anxiety and depression     CKD (chronic kidney disease), stage IV     DIALYSIS [id number] SHILEY IN RIGHT CHEST    Diabetes     Gout     High cholesterol     Hypertension     Hypothyroidism     Insomnia     Psoriasis     Thrombocytopenia     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    Vitiligo        Past Surgical History:   Procedure Laterality Date    ANKLE SURGERY Right 11/1990    APPENDECTOMY N/A 1954    ARTERIOVENOUS FISTULA/SHUNT SURGERY Left 07/16/2024    HIP BIPOLAR REPLACEMENT Right 01/2000    WRIST SURGERY      UNSURE WHICH SIDE  DAUGHTER REPORTS HAD SEVERE  CUT FROM WINDOW AND THEY HAD TO DO RECONSTRUCTIVE SURGERY WITH VESSELS AND NERVES        reports that he quit smoking about 24 years ago. His smoking use included cigarettes. He started smoking about 34 years ago. He has a 10 pack-year smoking history. He has been exposed to tobacco smoke. He has never used smokeless tobacco. He reports current alcohol use. He reports that he does not use drugs.    family history includes Cancer (age of onset: 35) in his sister; Colon cancer (age of onset: 77) in his sister; Diabetes in his brother, mother, and sister; Heart disease in his father and paternal uncle; Sleep apnea in his daughter and paternal aunt.    No Known Allergies    Medication:  Antibiotics:  Ceftriaxone 1 g IV every 24 hours   vancomycin dosing per pharmacy      Please refer to the medical record for a full medication list    Review of Systems  Review of systems could not be obtained due to   patient unresponsive.    Objective   Vital Signs   Temp:  [97.8 °F (36.6 °C)-98.5 °F (36.9 °C)] 98.5 °F (36.9 °C)  Heart Rate:  [76-93] 78  Resp:  [14-20] 18  BP: (106-147)/(51-77) 122/59    Physical Exam:   General: Ill-appearing  HEENT: Normocephalic, atraumatic, no scleral icterus.   Neck: Supple, trachea is midline  Cardiovascular: Normal rate, regular rhythm, normal S1 and S2, no murmurs, rubs, or gallops   Respiratory: Bibasilar crackles  GI: Abdomen is soft, nontender, nondistended, positive bowel sounds bilaterally  Musculoskeletal: no edema, tenderness or deformity  Skin: No rashes vitiligo  Extremities: No E/C/C  Neurological: Unable to assess  Psychiatric: Unable to assess    Results Review:   I reviewed the patient's new clinical results.  I reviewed the patient's new imaging results and agree with the interpretation.    Lab Results   Component Value Date    WBC 8.81 11/01/2024    HGB 8.2 (L) 11/01/2024    HCT 27.0 (L) 11/01/2024    MCV 92.5 11/01/2024     11/01/2024        Lab Results   Component Value Date    GLUCOSE 108 (H) 11/01/2024    BUN 32 (H) 11/01/2024    CREATININE 6.21 (H) 11/01/2024    EGFRIFNONA 28 (L) 02/23/2022    EGFRIFAFRI 27 (L) 03/24/2022    BCR 5.2 (L) 11/01/2024    CO2 22.6 11/01/2024    CALCIUM 8.5 (L) 11/01/2024    ALBUMIN 2.8 (L) 11/01/2024    LABIL2 1.0 (L) 03/24/2022    AST 9 11/01/2024    ALT <5 11/01/2024     CRP 21.87 -> 24.8  Procalcitonin 0.39    Microbiology:  10/31 MRSA nasal PCR negative  10/31 BCx NGTD x 2    Radiology:  10/27 chest x-ray with cardiomegaly and mild vascular congestion    10/31 CT of the chest abdomen pelvis shows new irregular endplate changes at T5/6 as compared to October 8 with appearance suspicious for discitis/osteomyelitis.  Adjacent fat stranding in the posterior mediastinum without drainable fluid collection.  Right lower lobe infiltrates with small pleural effusion.  Cardiomegaly.  Nonobstructing 1.1 cm stone in the right renal pelvis no hydronephrosis.  Atrophic kidneys.  Mild body wall edema.    Assessment & Plan   CT concerning for C5/6 discitis/osteomyelitis  End-stage renal disease on hemodialysis complicating above  Diabetes complicating above  AMS    Await additional imaging study and neurosurgical recommendations.  Blood cultures have been obtained x 2.  Empiric ceftriaxone and vancomycin have already been started.  Further antibiotic choice and duration pending imaging results, consultant's opinion and culture data.    I discussed the patient's findings and my recommendations with patient and nursing staff

## 2024-11-01 NOTE — CONSULTS
[facility] NEUROSURGERY CONSULT NOTE    Patient name: Nelson Wang  Referring Provider: Jes Ku  Reason for Consultation: concern for t5-6 osteomyelitis    Patient Care Team:  Domingo Bravo MD as PCP - General (Internal Medicine)  Josephine Warren APRN as Nurse Practitioner (Pulmonary Disease)  Virginie Lopez APRN as Nurse Practitioner (Pulmonary Disease)    Chief complaint: Concerns for osteomyelitis/discitis    Subjective .     History of present illness:    Patient is a 79 y.o. male with ESRD on HD, T2DM, HTN, and hypothyroidism transferred from [facility] to [facility] for concern of T5-6 discitis.  Patient history taken from chart as he is sedated with Zyprexa in anticipation of MRI and no family bedside.  He was initially taken to [facility] on 10/27 for altered mental status and hallucinations.  It seems as though his AMS was due to multiple medication changes recently.  Patient was also complaining of pain during this time and CT chest abdomen pelvis revealed irregular endplate changes at T5-6 concerning for discitis/osteomyelitis and subsequently transferred.  Apparently he has been complaining of back pain for about a month and was admitted to [facility] in late September through early October but no acute findings on imaging. Of note, he has a R IJ tunnel catheter for HD access as his AV fistula is not ready for use.    Review of Systems  Review of Systems   Unable to perform ROS: Other       History  PAST MEDICAL HISTORY  Past Medical History:   Diagnosis Date    Abnormal stress test     Anemia     IRON INFUSIONS WITH DIALYSIS    Anesthesia     WITH HIP REPLACEMENT DAUGHTER BELIEVES HAD SVT 2000    Ankle pain, right     Anxiety and depression     CKD (chronic kidney disease), stage IV     DIALYSIS DOPB-[id number] SHIARCHANA IN RIGHT CHEST    Diabetes     Gout     High cholesterol     History of peritoneal dialysis     HL (hearing loss)     Hyperkalemia     Hypertension     Hypothyroidism      Insomnia     Night terrors     Psoriasis     Psoriasis     Sleep walking     Thrombocytopenia     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    Vitiligo        PAST SURGICAL HISTORY  Past Surgical History:   Procedure Laterality Date    ANKLE SURGERY Right 11/1990    APPENDECTOMY N/A 1954    ARTERIOVENOUS FISTULA/SHUNT SURGERY Left 07/16/2024    Procedure: Creation of left arm arteriovenous fistula;  Surgeon: Moses Mir MD;  Location: [facility] MAIN OR;  Service: Vascular;  Laterality: Left;    BRONCHOSCOPY N/A 03/01/2024    Procedure: BRONCHOSCOPY WITH BAL AND WASHINGS;  Surgeon: Sandra Espinal MD;  Location: [facility] ENDOSCOPY;  Service: Pulmonary;  Laterality: N/A;  PNEUMONIA    BRONCHOSCOPY N/A 08/30/2024    Procedure: BRONCHOSCOPY WITH BALS AND WASHINGS;  Surgeon: Sandra Espinal MD;  Location: [facility] ENDOSCOPY;  Service: Pulmonary;  Laterality: N/A;  MUCOUS PLUGGING    CARDIAC CATHETERIZATION N/A 05/29/2024    Procedure: Left Heart Cath with possible coronary angioplasty;  Surgeon: Stephan Nichols MD;  Location: [facility] CATH INVASIVE LOCATION;  Service: Cardiology;  Laterality: N/A;    COLONOSCOPY N/A 06/2022    [facility]    ENDOSCOPY N/A 10/28/2024    Procedure: ESOPHAGOGASTRODUODENOSCOPY INSERTION OF LIGHTED INSTRUMENT TO VIEW ESOPHAGUS, STOMACH AND SMALL INTESTINE;  Surgeon: Kingsley Peraza MD;  Location: [facility] ENDOSCOPY;  Service: Gastroenterology;  Laterality: N/A;  Gastritis    HIP BIPOLAR REPLACEMENT Right 01/2000    INSERTION HEMODIALYSIS CATHETER Left 04/09/2024    Procedure: HEMODIALYSIS CATHETER INSERTION;  Surgeon: Jose Berry MD;  Location: [facility] OR;  Service: General;  Laterality: Left;    INSERTION HEMODIALYSIS CATHETER N/A 04/12/2024    Procedure: Tunneled hemodialysis catheter insertion;  Surgeon: Enrique Vinson MD;  Location: [facility] OR;  Service: Vascular;  Laterality: N/A;    INSERTION PERITONEAL DIALYSIS CATHETER N/A 03/27/2023    Procedure:  LAPAROSCOPIC INSERTION PERITONEAL DIALYSIS CATHETER, LAPAROSCOPIC OMENTOPEXY WITH LYSIS OF ADHESIONS;  Surgeon: Jose Berry MD;  Location:  [facility] MAIN OR;  Service: General;  Laterality: N/A;    INSERTION PERITONEAL DIALYSIS CATHETER Left 2023    Procedure: REVISION OF PERITONEAL DIALYSIS CATHETER;  Surgeon: Radha Oreilly MD;  Location:  [facility] MAIN OR;  Service: General;  Laterality: Left;    REMOVAL PERITONEAL DIALYSIS CATHETER N/A 2024    Procedure: REMOVAL PERITONEAL DIALYSIS CATHETER;  Surgeon: Jose Berry MD;  Location:  [facility] MAIN OR;  Service: General;  Laterality: N/A;    RENAL BIOPSY Left 07/15/2022    UPPER GASTROINTESTINAL ENDOSCOPY      WRIST SURGERY      UNSURE WHICH SIDE DAUGHTER REPORTS HAD SEVERE  CUT FROM WINDOW AND THEY HAD TO DO RECONSTRUCTIVE SURGERY WITH VESSELS AND NERVES       FAMILY HISTORY  Family History   Problem Relation Age of Onset    Diabetes Mother     Heart disease Father     Colon cancer Sister 77    Cancer Sister 35    Diabetes Sister     Diabetes Brother     Sleep apnea Paternal Aunt     Heart disease Paternal Uncle     Sleep apnea Daughter     Malig Hyperthermia Neg Hx        SOCIAL HISTORY  Social History     Tobacco Use    Smoking status: Former     Current packs/day: 0.00     Average packs/day: 1 pack/day for 10.0 years (10.0 ttl pk-yrs)     Types: Cigarettes     Start date:      Quit date: 2000     Years since quittin.8     Passive exposure: Past    Smokeless tobacco: Never    Tobacco comments:     quit 25 years ago, chews nicotine gum in past   Vaping Use    Vaping status: Never Used   Substance Use Topics    Alcohol use: Yes     Comment: 1 DRINK/DAY-rarely    Drug use: Never       No family or visitors at bedside. Has sitter    Allergies:  Patient has no known allergies.    MEDICATIONS:  Medications Prior to Admission   Medication Sig Dispense Refill Last Dose/Taking    allopurinol (ZYLOPRIM) 300 MG tablet Take 1 tablet  by mouth Daily As Needed.   Unknown    amitriptyline (ELAVIL) 10 MG tablet Take 1 tablet by mouth Every Night. (Patient taking differently: Take 0.5 tablets by mouth Every Night.) 90 tablet 3 Unknown    apixaban (ELIQUIS) 5 MG tablet tablet Take 1 tablet by mouth Every 12 (Twelve) Hours for 30 days. Indications: Atrial Fibrillation 60 tablet 0 Unknown    atorvastatin (LIPITOR) 40 MG tablet Take 1 tablet by mouth Daily. 90 tablet 1 Unknown    Budeson-Glycopyrrol-Formoterol (Breztri Aerosphere) 160-9-4.8 MCG/ACT aerosol inhaler Inhale 2 puffs 2 (Two) Times a Day. 1 each 5 Unknown    carvedilol (COREG) 25 MG tablet Take 1 tablet by mouth Every Morning.   Unknown    carvedilol (COREG) 25 MG tablet Take 1 tablet by mouth Daily As Needed (in evening based on BP reading).   Unknown    cloNIDine (CATAPRES) 0.2 MG tablet Take 1 tablet by mouth Every 8 (Eight) Hours As Needed for High Blood Pressure.   Unknown    Insulin Glargine (LANTUS SOLOSTAR) 100 UNIT/ML injection pen Inject 10 Units under the skin into the appropriate area as directed Every Night.   Unknown    Levothyroxine Sodium 175 MCG capsule Take 175 mcg by mouth Daily. 30 capsule 0 Unknown    lidocaine-prilocaine (EMLA) 2.5-2.5 % cream Apply 1 Application topically to the appropriate area as directed As Needed for Injection Site Pain.   Unknown    Methoxy PEG-Epoetin Beta (MIRCERA IJ) 1 (One) Time As Needed (Every 14 days PRN- would receive during dialysis).   Unknown    midodrine (PROAMATINE) 2.5 MG tablet Take 1 tablet by mouth 3 (Three) Times a Day Before Meals. On dialysis days (Patient taking differently: Take 1 tablet by mouth Take As Directed. Four Times a week PRN based on BP post Dialysis-   Monday, Tuesday, Thursday, and Friday) 90 tablet 3 Unknown    pantoprazole (PROTONIX) 40 MG EC tablet Take 1 tablet by mouth Daily. (Patient taking differently: Take 1 tablet by mouth 2 (Two) Times a Day.) 60 tablet 3 Unknown    Risankizumab-rzaa (Skyrizi Pen) 150  MG/ML solution auto-injector Inject 1 dose under the skin into the appropriate area as directed Take As Directed. Every 3 months   Unknown    sertraline (ZOLOFT) 100 MG tablet Take 1 tablet by mouth Every Night. 90 tablet 2 Unknown    sevelamer (RENVELA) 800 MG tablet Take 2 tablets by mouth 3 (Three) Times a Day With Meals for 30 days. (Patient taking differently: Take 2 tablets by mouth 2 (Two) Times a Day.) 180 tablet 0 Unknown    torsemide (DEMADEX) 100 MG tablet Take 1 tablet by mouth Daily. 90 tablet 2 Unknown    traZODone (DESYREL) 50 MG tablet Take 1 tablet by mouth Every Night. (Patient taking differently: Take 0.5-1 tablets by mouth Every Night.) 90 tablet 2 Unknown         Current Facility-Administered Medications:     acetaminophen (TYLENOL) tablet 650 mg, 650 mg, Oral, Q4H PRN **OR** acetaminophen (TYLENOL) 160 MG/5ML oral solution 650 mg, 650 mg, Oral, Q4H PRN **OR** acetaminophen (TYLENOL) suppository 650 mg, 650 mg, Rectal, Q4H PRN, Jes Ku, APRN    amitriptyline (ELAVIL) tablet 10 mg, 10 mg, Oral, Nightly, Fela Snyder MD    arformoterol (BROVANA) nebulizer solution 15 mcg, 15 mcg, Nebulization, BID - RT, Jes Ku, APRN    atorvastatin (LIPITOR) tablet 40 mg, 40 mg, Oral, Daily, Fela Snyder MD    sennosides-docusate (PERICOLACE) 8.6-50 MG per tablet 2 tablet, 2 tablet, Oral, BID PRN **AND** polyethylene glycol (MIRALAX) packet 17 g, 17 g, Oral, Daily PRN **AND** bisacodyl (DULCOLAX) EC tablet 5 mg, 5 mg, Oral, Daily PRN **AND** bisacodyl (DULCOLAX) suppository 10 mg, 10 mg, Rectal, Daily PRN, Jes Ku, APRN    budesonide (PULMICORT) nebulizer solution 0.5 mg, 0.5 mg, Nebulization, BID - RT, Jes Ku APRN, 0.5 mg at 11/01/24 0658    carvedilol (COREG) tablet 3.125 mg, 3.125 mg, Oral, Lillian ABDI Bokhodir S, MD    cefTRIAXone (ROCEPHIN) 1,000 mg in sodium chloride 0.9 % 100 mL MBP, 1,000 mg, Intravenous, Q24H, Jes Ku APRN,  Last Rate: 200 mL/hr at 11/01/24 0847, 1,000 mg at 11/01/24 0847    [START ON 11/2/2024] fentaNYL (DURAGESIC) 25 MCG/HR patch 1 patch, 1 patch, Transdermal, Q72H **AND** [START ON 11/2/2024] Check Fentanyl Patch Placement, 1 each, Does not apply, Q12H, Jes Ku APRN    cloNIDine (CATAPRES) tablet 0.2 mg, 0.2 mg, Oral, Q8H PRN, Fela Snyder MD    dextrose (D50W) (25 g/50 mL) IV injection 25 g, 25 g, Intravenous, Q15 Min PRN, Jes Ku APRN    dextrose (GLUTOSE) oral gel 15 g, 15 g, Oral, Q15 Min PRN, Jes Ku APRN    famotidine (PEPCID) tablet 20 mg, 20 mg, Oral, BID PRN, Jes Ku APRN    glucagon (GLUCAGEN) injection 1 mg, 1 mg, Intramuscular, Q15 Min PRN, Jes Ku APRN    heparin (porcine) 5000 UNIT/ML injection 5,000 Units, 5,000 Units, Subcutaneous, Q8H, Fela Snyder MD    HYDROmorphone (DILAUDID) injection 0.25 mg, 0.25 mg, Intravenous, Q2H PRN, Fela Snyder MD, 0.25 mg at 11/01/24 1228    insulin regular (humuLIN R,novoLIN R) injection 2-7 Units, 2-7 Units, Subcutaneous, Q6H, Jes Ku APRN    ipratropium-albuterol (DUO-NEB) nebulizer solution 3 mL, 3 mL, Nebulization, Q6H PRN, Fela Snyder MD    levothyroxine (SYNTHROID, LEVOTHROID) tablet 175 mcg, 175 mcg, Oral, Q AM, Fela Snyedr MD    melatonin tablet 5 mg, 5 mg, Oral, Nightly PRN, Jes Ku APRN    nitroglycerin (NITROSTAT) SL tablet 0.4 mg, 0.4 mg, Sublingual, Q5 Min PRN, Jes Ku APRN    OLANZapine (zyPREXA) injection 5 mg, 5 mg, Intramuscular, Q8H PRN, Guy Dai III, MD    ondansetron (ZOFRAN) injection 4 mg, 4 mg, Intravenous, Q6H PRN, Jes Ku APRN    pantoprazole (PROTONIX) EC tablet 40 mg, 40 mg, Oral, BID, Fela Snyder MD    Pharmacy to dose vancomycin, , Does not apply, Continuous PRN, Jes Ku APRN    risperiDONE (risperDAL) tablet 1 mg, 1 mg, Oral, BID, Suhas,  Guy Downing III, MD    sertraline (ZOLOFT) tablet 100 mg, 100 mg, Oral, Nightly, Fela Snyder MD    sevelamer (RENVELA) tablet 1,600 mg, 1,600 mg, Oral, BID, Fela Snyder MD    sodium chloride 0.9 % flush 10 mL, 10 mL, Intravenous, Q12H, Jes Ku APRN, 10 mL at 11/01/24 0853    sodium chloride 0.9 % flush 10 mL, 10 mL, Intravenous, PRN, Jes Ku APRN    sodium chloride 0.9 % infusion 40 mL, 40 mL, Intravenous, PRN, Jes Ku APRN    torsemide (DEMADEX) tablet 100 mg, 100 mg, Oral, Daily, Fela Snyder MD    vancomycin 750 mg in sodium chloride 0.9 % 250 mL IVPB-VTB, 10 mg/kg, Intravenous, Once, Jes Ku APRN    Vancomycin Pharmacy Intermittent/Pulse Dosing, , Does not apply, Daily, Jes Ku APRN    COMORBID CONDITIONS:  chronic kidney disease including stage, Diabetes Type 2, and Hypertension    Objective     Results Review:  LABS:  Results from last 7 days   Lab Units 11/01/24  0444 10/31/24  0802 10/30/24  0436   WBC 10*3/mm3 8.81 6.40 6.52   HEMOGLOBIN g/dL 8.2* 8.1* 8.0*   HEMATOCRIT % 27.0* 26.8* 26.2*   PLATELETS 10*3/mm3 163 176 191     Results from last 7 days   Lab Units 11/01/24  0443 10/31/24  0802 10/30/24  0436 10/29/24  0304   SODIUM mmol/L 133* 132* 130* 130*   POTASSIUM mmol/L 4.8 4.4 4.3 4.1   CHLORIDE mmol/L 98 95* 92* 94*   CO2 mmol/L 22.6 23.6 26.6 27.3   BUN mg/dL 32* 21 34* 25*   CREATININE mg/dL 6.21* 4.24* 5.82* 4.40*   CALCIUM mg/dL 8.5* 8.9 8.8 8.4*   BILIRUBIN mg/dL 0.6  --  0.9 0.7   ALK PHOS U/L 64  --  69 65   ALT (SGPT) U/L <5  --  <5 <5   AST (SGOT) U/L 9  --  8 8   GLUCOSE mg/dL 108* 117* 159* 209*     Bcx2 pending    DIAGNOSTICS:  CT chest 10/30:   1. New irregular endplate changes at T5-T6 since 10/8/2024 comparison, appearance suspicious for discitis/osteomyelitis. Adjacent fat stranding in the posterior mediastinum without organized/drainable fluid collection. Consider infectious disease    consultation and further assessment with MRI of thoracic spine without and with IV contrast (if possible).  2. Mild patchy and consolidative airspace opacities in the right lower lobe with small adjacent pleural effusion new from previous comparison. Differential includes atelectasis versus early developing infection. Recommend clinical correlation.  3. Cardiomegaly. Aortic and coronary atherosclerotic disease.  4. Dilated main pulmonary artery which can be seen with pulmonary arterial hypertension.  5. Fluid/debris in the mid thoracic esophagus. Recommend aspiration precautions.    CT abd/pelvis:  1. No convincing acute intra-abdominal process. Specifically no acute GI related abnormality by CT.  2. Nonobstructing 1.1 cm stone in the right renal pelvis similar to prior. No hydronephrosis. Atrophic kidneys with multiple renal cysts related to chronic renal disease and/or dialysis.  3. Mild body wall edema.  4. Other chronic/ancillary findings as above.       Results Review:   I reviewed the patient's new clinical results.  I personally viewed and interpreted the patient's chart    Vital Signs   Temp:  [97.1 °F (36.2 °C)-98.5 °F (36.9 °C)] 97.1 °F (36.2 °C)  Heart Rate:  [76-93] 84  Resp:  [18-20] 18  BP: (122-156)/(43-63) 125/43    Physical Exam:  Physical Exam  Constitutional:       General: He is sleeping. He is not in acute distress.     Appearance: He is not toxic-appearing.       Neurological Exam  Mental Status    Drowsy and difficult to arouse after receiving zyprexa .    Motor  Normal muscle bulk throughout.  Spontaneously moves all extremities.    Gait    Not tested.      Assessment & Plan       Discitis of lumbar region    Essential hypertension    Anemia due to chronic kidney disease    Type 2 diabetes mellitus without complication    Bipolar disorder    Chronic respiratory failure with hypoxia    ESRD on dialysis    Discitis    Metabolic encephalopathy    Aspiration pneumonia      Problem List Items  "Addressed This Visit    None     79-year-old male with ESRD on HD transferred from [facility] for concerns of T5-6 osteomyelitis/discitis seen on CT chest.  Physical exam and history are very limited as he is currently in a deep sleep after receiving Zyprexa.  Sitter at bedside states that prior to his medication he was moving all extremities and not complaining of any pain but was confused and delirious.  WBC 8.8 and he is afebrile but CRP 24.89 and sed rate 105. BC pending. Need contrasted imaging of entire spine.  Will coordinate with dialysis so that he may receive dialysis following imaging.  He has been started on empiric ceftriaxone and vancomycin. ID following.  Further recommendations following MRI.    PLAN:     MRI C/T/L w/ contrast  HD following MRI  ABX per ID    I discussed the patient's findings and my recommendations with patient, nursing staff, and Dr. Das    During patient visit, I utilized appropriate personal protective equipment including gloves and mask.  Mask used was standard procedure mask. Appropriate PPE was worn during the entire visit.  Hand hygiene was completed before and after.     Nery Ovalle, APRN  11/01/24  16:08 EDT    \"Dictated utilizing Dragon dictation\".     "

## 2024-11-01 NOTE — H&P
Patient Name:  Nelson Wang  YOB: 1945  MRN:  [id number]  Admit Date:  11/1/2024  Patient Care Team:  Domingo Bravo MD as PCP - General (Internal Medicine)  Josephine Warren APRN as Nurse Practitioner (Pulmonary Disease)  Virginie Lopez APRN as Nurse Practitioner (Pulmonary Disease)      Subjective   History Present Illness     No chief complaint on file.        History of Present Illness  Nelson Wang is a 79 y.o. male with ESRD on home dialysis, HEENT, HLD,, hypothyroidism,  chronic thrombocytopenia, anxiety, depression, recent history of L3 compression fracture, DDD of lumbar spine, type II DM,, diabetes, history of alcohol use in excess, tobacco use, was admitted to [facility] on 10/27/2024 for chief after presenting with altered mental status and hallucinations.  Prior to hospitalization was having several medication changes to determine the best medications to manage symptoms. Psychiatry was consulted and adjusted medications.  Patient was also complaining of abdominal pain, and underwent EGD on 10/28/2024 and found to have gastritis.  Due to complaint of persistent pain of unclear cause and source,, CT imaging was ordered of patient's chest, abdomen, and pelvis to attempt to locate source of patient's pain.  Imaging resulted with new irregular endplate changes at T5-T6 concerning for discitis/osteomyelitis.  He also had adjacent fat stranding in the posterior mediastinum without organized or drainable fluid collection.  Patient was transferred to [facility] on 11/1/2024 for further management of T5-T6 discitis/osteomyelitis after discussion with neurosurgery    Review of Systems   UTO  Personal History     Past Medical History:   Diagnosis Date    Abnormal stress test     Anemia     IRON INFUSIONS WITH DIALYSIS    Anesthesia     WITH HIP REPLACEMENT DAUGHTER BELIEVES HAD SVT 2000    Ankle pain, right     Anxiety and depression     CKD (chronic kidney disease),  stage IV     DIALYSIS [id number] SHILEY IN RIGHT CHEST    Diabetes     Gout     High cholesterol     History of peritoneal dialysis     HL (hearing loss)     Hyperkalemia     Hypertension     Hypothyroidism     Insomnia     Night terrors     Psoriasis     Psoriasis     Sleep walking     Thrombocytopenia     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    Vitiligo      Past Surgical History:   Procedure Laterality Date    ANKLE SURGERY Right 11/1990    APPENDECTOMY N/A 1954    ARTERIOVENOUS FISTULA/SHUNT SURGERY Left 07/16/2024    Procedure: Creation of left arm arteriovenous fistula;  Surgeon: Moses Mir MD;  Location: [facility] MAIN OR;  Service: Vascular;  Laterality: Left;    BRONCHOSCOPY N/A 03/01/2024    Procedure: BRONCHOSCOPY WITH BAL AND WASHINGS;  Surgeon: Sandra Espinal MD;  Location: [facility] ENDOSCOPY;  Service: Pulmonary;  Laterality: N/A;  PNEUMONIA    BRONCHOSCOPY N/A 08/30/2024    Procedure: BRONCHOSCOPY WITH BALS AND WASHINGS;  Surgeon: Sandra Espinal MD;  Location: [facility] ENDOSCOPY;  Service: Pulmonary;  Laterality: N/A;  MUCOUS PLUGGING    CARDIAC CATHETERIZATION N/A 05/29/2024    Procedure: Left Heart Cath with possible coronary angioplasty;  Surgeon: Stephan Nichols MD;  Location: [facility] CATH INVASIVE LOCATION;  Service: Cardiology;  Laterality: N/A;    COLONOSCOPY N/A 06/2022    [facility]    ENDOSCOPY N/A 10/28/2024    Procedure: ESOPHAGOGASTRODUODENOSCOPY INSERTION OF LIGHTED INSTRUMENT TO VIEW ESOPHAGUS, STOMACH AND SMALL INTESTINE;  Surgeon: Kingsley Peraza MD;  Location: [facility] ENDOSCOPY;  Service: Gastroenterology;  Laterality: N/A;  Gastritis    HIP BIPOLAR REPLACEMENT Right 01/2000    INSERTION HEMODIALYSIS CATHETER Left 04/09/2024    Procedure: HEMODIALYSIS CATHETER INSERTION;  Surgeon: Jose Berry MD;  Location: [facility] OR;  Service: General;  Laterality: Left;    INSERTION HEMODIALYSIS CATHETER N/A 04/12/2024    Procedure: Tunneled hemodialysis  catheter insertion;  Surgeon: Enrique Vinson MD;  Location: [facility] MAIN OR;  Service: Vascular;  Laterality: N/A;    INSERTION PERITONEAL DIALYSIS CATHETER N/A 2023    Procedure: LAPAROSCOPIC INSERTION PERITONEAL DIALYSIS CATHETER, LAPAROSCOPIC OMENTOPEXY WITH LYSIS OF ADHESIONS;  Surgeon: Jose Berry MD;  Location: [facility] MAIN OR;  Service: General;  Laterality: N/A;    INSERTION PERITONEAL DIALYSIS CATHETER Left 2023    Procedure: REVISION OF PERITONEAL DIALYSIS CATHETER;  Surgeon: Radha Oreilly MD;  Location: [facility] MAIN OR;  Service: General;  Laterality: Left;    REMOVAL PERITONEAL DIALYSIS CATHETER N/A 2024    Procedure: REMOVAL PERITONEAL DIALYSIS CATHETER;  Surgeon: Jose Berry MD;  Location: [facility] MAIN OR;  Service: General;  Laterality: N/A;    RENAL BIOPSY Left 07/15/2022    UPPER GASTROINTESTINAL ENDOSCOPY      WRIST SURGERY      UNSURE WHICH SIDE DAUGHTER REPORTS HAD SEVERE  CUT FROM WINDOW AND THEY HAD TO DO RECONSTRUCTIVE SURGERY WITH VESSELS AND NERVES     Family History   Problem Relation Age of Onset    Diabetes Mother     Heart disease Father     Colon cancer Sister 77    Cancer Sister 35    Diabetes Sister     Diabetes Brother     Sleep apnea Paternal Aunt     Heart disease Paternal Uncle     Sleep apnea Daughter     Malig Hyperthermia Neg Hx      Social History     Tobacco Use    Smoking status: Former     Current packs/day: 0.00     Average packs/day: 1 pack/day for 10.0 years (10.0 ttl pk-yrs)     Types: Cigarettes     Start date:      Quit date: 2000     Years since quittin.8     Passive exposure: Past    Smokeless tobacco: Never    Tobacco comments:     quit 25 years ago, chews nicotine gum in past   Vaping Use    Vaping status: Never Used   Substance Use Topics    Alcohol use: Yes     Comment: 1 DRINK/DAY-rarely    Drug use: Never     Current Facility-Administered Medications on File Prior to Encounter   Medication Dose Route  Frequency Provider Last Rate Last Admin    [COMPLETED] acetaminophen (OFIRMEV) injection 1,000 mg  1,000 mg Intravenous Once [place], [place]   1,000 mg at 10/31/24 2130    [COMPLETED] iopamidol (ISOVUE-370) 76 % injection 100 mL  100 mL Intravenous Once in imaging Saúl Vargas MD   90 mL at 10/31/24 1803    [COMPLETED] vancomycin IVPB 1500 mg in 0.9% NaCl (Premix) 500 mL  20 mg/kg Intravenous Once Clark Christina, .3 mL/hr at 10/31/24 2232 1,500 mg at 10/31/24 2232    [DISCONTINUED] acetaminophen (TYLENOL) 160 MG/5ML oral solution 650 mg  650 mg Oral Q4H PRN Kingsley Peraza MD        [DISCONTINUED] acetaminophen (TYLENOL) suppository 650 mg  650 mg Rectal Q4H PRN Kingsley Peraza MD        [DISCONTINUED] acetaminophen (TYLENOL) tablet 650 mg  650 mg Oral Q4H PRN Kingsley Peraza MD   650 mg at 10/30/24 0207    [DISCONTINUED] arformoterol (BROVANA) nebulizer solution 15 mcg  15 mcg Nebulization BID - RT Scottie Valentin MD   15 mcg at 10/1945    [DISCONTINUED] atorvastatin (LIPITOR) tablet 40 mg  40 mg Oral Nightly Kingsley Peraza MD   40 mg at 10/29/24 2008    [DISCONTINUED] bisacodyl (DULCOLAX) EC tablet 5 mg  5 mg Oral Daily PRN Kingsley Peraza MD        [DISCONTINUED] bisacodyl (DULCOLAX) suppository 10 mg  10 mg Rectal Daily PRN Kingsley Peraza MD        [DISCONTINUED] budesonide (PULMICORT) nebulizer solution 0.5 mg  0.5 mg Nebulization BID - RT Scottie Valentin MD   0.5 mg at 10/1945    [DISCONTINUED] carvedilol (COREG) tablet 12.5 mg  12.5 mg Oral Q12H Kingsley Peraza MD   12.5 mg at 10/30/24 0800    [DISCONTINUED] cefTRIAXone (ROCEPHIN) in NS 1 gram/10ml IV PUSH syringe  1,000 mg Intravenous Q24H Saúl Vargas MD        [DISCONTINUED] cefTRIAXone (ROCEPHIN) in NS 2 GRAMS/20ml IV PUSH syringe  2,000 mg Intravenous Q24H Saúl Vargas MD   2,000 mg at 10/31/24 0917    [DISCONTINUED] cefTRIAXone (ROCEPHIN) in NS 2 GRAMS/20ml IV PUSH syringe   2,000 mg Intravenous Q24H Saúl Vargas MD        [DISCONTINUED] cloNIDine (CATAPRES) tablet 0.1 mg  0.1 mg Oral 4x Daily PRN Kingsley Peraza MD   0.1 mg at 10/27/24 2056    [DISCONTINUED] cloNIDine (CATAPRES) tablet 0.1 mg  0.1 mg Oral Q8H Kingsley Peraza MD   0.1 mg at 10/30/24 1422    [DISCONTINUED] heparin (porcine) injection 3,800 Units  3,800 Units Intracatheter PRN Coreen Castro MD   3,800 Units at 10/30/24 1238    [DISCONTINUED] HYDROmorphone (DILAUDID) injection 0.25 mg  0.25 mg Intravenous Q6H PRN Colton Benitez MD   0.25 mg at 10/31/24 2131    [DISCONTINUED] HYDROmorphone (DILAUDID) tablet 1 mg  1 mg Oral Q6H PRN Saúl Vargas MD        [DISCONTINUED] Iohexol (OMNIPAQUE) 12 MG/ML solution 500 mL  500 mL Oral Once Saúl Vargas MD        [DISCONTINUED] ipratropium-albuterol (DUO-NEB) nebulizer solution 3 mL  3 mL Nebulization Q4H PRN Scottie Valentin MD        [DISCONTINUED] levothyroxine (SYNTHROID, LEVOTHROID) tablet 175 mcg  175 mcg Oral Q AM Kingsley Peraza MD   175 mcg at 10/30/24 0522    [DISCONTINUED] Lidocaine 4 % 3 patch  3 patch Transdermal Q24H Saúl Vargas MD   3 patch at 10/31/24 0917    [DISCONTINUED] ondansetron (ZOFRAN) injection 4 mg  4 mg Intravenous Q6H PRN Kingsley Peraza MD        [DISCONTINUED] ondansetron ODT (ZOFRAN-ODT) disintegrating tablet 4 mg  4 mg Oral Q6H PRN Kingsley Peraza MD        [DISCONTINUED] pantoprazole (PROTONIX) EC tablet 40 mg  40 mg Oral BID Kingsley Peraza MD   40 mg at 10/30/24 0800    [DISCONTINUED] Pharmacy to Dose Cefepime   Does not apply Continuous PRN Christina Clark PA        [DISCONTINUED] Pharmacy to dose vancomycin   Does not apply Continuous PRN Christina Clark PA        [DISCONTINUED] polyethylene glycol (MIRALAX) packet 17 g  17 g Oral Daily PRN Kingsley Peraza MD        [DISCONTINUED] pregabalin (LYRICA) capsule 25 mg  25 mg Oral Daily Saúl Vargas MD   25 mg at 10/30/24  0800    [DISCONTINUED] risperiDONE (risperDAL) tablet 0.5 mg  0.5 mg Oral Q12H Saúl Vargas MD   0.5 mg at 10/30/24 0800    [DISCONTINUED] sennosides-docusate (PERICOLACE) 8.6-50 MG per tablet 2 tablet  2 tablet Oral BID PRN Kingsley Peraza MD   2 tablet at 10/29/24 2008    [DISCONTINUED] sevelamer (RENVELA) tablet 1,600 mg  1,600 mg Oral TID With Meals Saúl Vargas MD   1,600 mg at 10/30/24 1423    [DISCONTINUED] sodium chloride 0.9 % flush 10 mL  10 mL Intravenous PRN Kingsley Peraza MD        [DISCONTINUED] sodium chloride 0.9 % flush 10 mL  10 mL Intravenous Q12H Kingsley Peraza MD   10 mL at 10/31/24 2134    [DISCONTINUED] sodium chloride 0.9 % flush 10 mL  10 mL Intravenous PRN Kingsley Peraza MD        [DISCONTINUED] trolamine salicylate (ASPERCREME) 10 % cream 1 Application  1 Application Topical Q6H PRN Shiraz Tarango MD        [DISCONTINUED] Vancomycin Pharmacy Intermittent/Pulse Dosing   Does not apply Daily Christina Clark PA         Current Outpatient Medications on File Prior to Encounter   Medication Sig Dispense Refill    allopurinol (ZYLOPRIM) 300 MG tablet Take 1 tablet by mouth Daily As Needed.      amitriptyline (ELAVIL) 10 MG tablet Take 1 tablet by mouth Every Night. (Patient taking differently: Take 0.5 tablets by mouth Every Night.) 90 tablet 3    apixaban (ELIQUIS) 5 MG tablet tablet Take 1 tablet by mouth Every 12 (Twelve) Hours for 30 days. Indications: Atrial Fibrillation 60 tablet 0    atorvastatin (LIPITOR) 40 MG tablet Take 1 tablet by mouth Daily. 90 tablet 1    Budeson-Glycopyrrol-Formoterol (Breztri Aerosphere) 160-9-4.8 MCG/ACT aerosol inhaler Inhale 2 puffs 2 (Two) Times a Day. 1 each 5    carvedilol (COREG) 25 MG tablet Take 1 tablet by mouth Every Morning.      carvedilol (COREG) 25 MG tablet Take 1 tablet by mouth Daily As Needed (in evening based on BP reading).      cloNIDine (CATAPRES) 0.2 MG tablet Take 1 tablet by mouth Every 8 (Eight)  Hours As Needed for High Blood Pressure.      Insulin Glargine (LANTUS SOLOSTAR) 100 UNIT/ML injection pen Inject 10 Units under the skin into the appropriate area as directed Every Night.      Levothyroxine Sodium 175 MCG capsule Take 175 mcg by mouth Daily. 30 capsule 0    lidocaine-prilocaine (EMLA) 2.5-2.5 % cream Apply 1 Application topically to the appropriate area as directed As Needed for Injection Site Pain.      Methoxy PEG-Epoetin Beta (MIRCERA IJ) 1 (One) Time As Needed (Every 14 days PRN- would receive during dialysis).      midodrine (PROAMATINE) 2.5 MG tablet Take 1 tablet by mouth 3 (Three) Times a Day Before Meals. On dialysis days (Patient taking differently: Take 1 tablet by mouth Take As Directed. Four Times a week PRN based on BP post Dialysis-   Monday, Tuesday, Thursday, and Friday) 90 tablet 3    pantoprazole (PROTONIX) 40 MG EC tablet Take 1 tablet by mouth Daily. (Patient taking differently: Take 1 tablet by mouth 2 (Two) Times a Day.) 60 tablet 3    Risankizumab-rzaa (Skyrizi Pen) 150 MG/ML solution auto-injector Inject 1 dose under the skin into the appropriate area as directed Take As Directed. Every 3 months      sertraline (ZOLOFT) 100 MG tablet Take 1 tablet by mouth Every Night. 90 tablet 2    sevelamer (RENVELA) 800 MG tablet Take 2 tablets by mouth 3 (Three) Times a Day With Meals for 30 days. (Patient taking differently: Take 2 tablets by mouth 2 (Two) Times a Day.) 180 tablet 0    torsemide (DEMADEX) 100 MG tablet Take 1 tablet by mouth Daily. 90 tablet 2    traZODone (DESYREL) 50 MG tablet Take 1 tablet by mouth Every Night. (Patient taking differently: Take 0.5-1 tablets by mouth Every Night.) 90 tablet 2     No Known Allergies    Objective    Objective     Vital Signs  Temp:  [97.8 °F (36.6 °C)-98.5 °F (36.9 °C)] 98.5 °F (36.9 °C)  Heart Rate:  [76-93] 78  Resp:  [17-20] 18  BP: (106-147)/(51-77) 122/59  SpO2:  [87 %-100 %] 93 %  on  Flow (L/min) (Oxygen Therapy):  [1-3] 1;    Device (Oxygen Therapy): nasal cannula  Body mass index is 26.44 kg/m².    Physical Exam  General: Laying in bed, confused, restless,  HEENT: Normocephalic, atraumatic  Eyes: PERRL, EOMI, anicteric sclerae  Lungs: Diminished, no wheezing, nonlabored breathing,  CV: Regular rate and rhythm, no murmurs rubs or gallops  Abdomen: Soft, nontender, nondistended.  Normoactive bowel sounds  Extremities: No significant peripheral edema  Skin: Clean/dry/intact, no rashes  Neuro: Confused, disoriented, does not follow commands,, no gross focal neurological deficits appreciated, moving all extremities spontaneously    Results Review:  I reviewed the patient's new clinical results.  I reviewed the patient's new imaging results and agree with the interpretation.  I reviewed the patient's other test results and agree with the interpretation  I personally viewed and interpreted the patient's EKG/Telemetry data    Lab Results (last 24 hours)       Procedure Component Value Units Date/Time    MRSA Screen, PCR (Inpatient) - Swab, Nares [[id number]]  (Normal) Collected: 10/31/24 2114    Specimen: Swab from Nares Updated: 10/31/24 2251     MRSA PCR No MRSA Detected    Narrative:      The negative predictive value of this diagnostic test is high and should only be used to consider de-escalating anti-MRSA therapy. A positive result may indicate colonization with MRSA and must be correlated clinically.    Comprehensive Metabolic Panel [[id number]]  (Abnormal) Collected: 11/01/24 0443    Specimen: Blood from Hand, Right Updated: 11/01/24 0641     Glucose 108 mg/dL      BUN 32 mg/dL      Creatinine 6.21 mg/dL      Sodium 133 mmol/L      Potassium 4.8 mmol/L      Chloride 98 mmol/L      CO2 22.6 mmol/L      Calcium 8.5 mg/dL      Total Protein 6.7 g/dL      Albumin 2.8 g/dL      ALT (SGPT) <5 U/L      AST (SGOT) 9 U/L      Alkaline Phosphatase 64 U/L      Total Bilirubin 0.6 mg/dL      Globulin 3.9 gm/dL      A/G Ratio 0.7 g/dL       BUN/Creatinine Ratio 5.2     Anion Gap 12.4 mmol/L      eGFR 8.6 mL/min/1.73      Comment: <15 Indicative of kidney failure       Narrative:      GFR Normal >60  Chronic Kidney Disease <60  Kidney Failure <15    The GFR formula is only valid for adults with stable renal function between ages 18 and 70.    Lipase [[id number]]  (Abnormal) Collected: 11/01/24 0443    Specimen: Blood from Hand, Right Updated: 11/01/24 0640     Lipase 9 U/L     Magnesium [[id number]]  (Normal) Collected: 11/01/24 0443    Specimen: Blood from Hand, Right Updated: 11/01/24 0641     Magnesium 2.1 mg/dL     C-reactive Protein [[id number]]  (Abnormal) Collected: 11/01/24 0443    Specimen: Blood from Hand, Right Updated: 11/01/24 0640     C-Reactive Protein 24.89 mg/dL     Vancomycin, Random [[id number]]  (Normal) Collected: 11/01/24 0443    Specimen: Blood from Hand, Right Updated: 11/01/24 0849     Vancomycin Random 15.10 mcg/mL     Narrative:      Therapeutic Ranges for Vancomycin    Vancomycin Random   5.0-40.0 mcg/mL  Vancomycin Trough   5.0-20.0 mcg/mL  Vancomycin Peak     20.0-40.0 mcg/mL    CBC (No Diff) [[id number]]  (Abnormal) Collected: 11/01/24 0444    Specimen: Blood from Hand, Right Updated: 11/01/24 0622     WBC 8.81 10*3/mm3      RBC 2.92 10*6/mm3      Hemoglobin 8.2 g/dL      Hematocrit 27.0 %      MCV 92.5 fL      MCH 28.1 pg      MCHC 30.4 g/dL      RDW 14.5 %      RDW-SD 49.1 fl      MPV 9.5 fL      Platelets 163 10*3/mm3     Lactic Acid, Plasma [[id number]]  (Normal) Collected: 11/01/24 0444    Specimen: Blood from Hand, Right Updated: 11/01/24 0638     Lactate 0.9 mmol/L     Sedimentation Rate [[id number]]  (Abnormal) Collected: 11/01/24 0444    Specimen: Blood from Hand, Right Updated: 11/01/24 0637     Sed Rate 105 mm/hr     POC Glucose Once [[id number]]  (Normal) Collected: 11/01/24 0612    Specimen: Blood Updated: 11/01/24 0613     Glucose 117 mg/dL     POC Glucose Once [[id number]]  (Normal) Collected: 11/01/24 0714     Specimen: Blood Updated: 11/01/24 0715     Glucose 109 mg/dL             Imaging Results (Last 24 Hours)       ** No results found for the last 24 hours. **            Results for orders placed during the hospital encounter of 09/28/24    Adult Transthoracic Echo Complete W/ Cont if Necessary Per Protocol    Interpretation Summary    Left ventricular ejection fraction appears to be 56 - 60%.    Left ventricular wall thickness is consistent with mild concentric hypertrophy.    Left ventricular diastolic function is consistent with (grade I) impaired relaxation.    There is calcification of the aortic valve.No significant stenosis.      Telemetry Scan   Final Result      Telemetry Scan   Final Result      Telemetry Scan   Final Result           Assessment/Plan     Active Hospital Problems    Diagnosis  POA    **Discitis of lumbar region [M46.46]  Yes    Discitis [M46.40]  Yes    Metabolic encephalopathy [G93.41]  Yes    Aspiration pneumonia [J69.0]  Yes    ESRD on dialysis [N18.6, Z99.2]  Not Applicable    Bipolar disorder [F31.9]  Yes    Chronic respiratory failure with hypoxia [J96.11]  Yes    Essential hypertension [I10]  Yes    Anemia due to chronic kidney disease [N18.9, D63.1]  Yes    Type 2 diabetes mellitus without complication [E11.9]  Yes      Resolved Hospital Problems   No resolved problems to display.       T5-T6 discitis/osteomyelitis.   -Initiated on IV ceftriaxone, vancomycin  -Neurosurgery/ID consulted  -MRI of the cervical spine/thoracic/lumbar spine pending    Metabolic encephalopathy  Hallucinations  -CT head 10/8/2024 with no acute findings  -Continues with significant agitation/combative this morning.  Ordered one-time as Zyprexa,  -Psychiatry consulted    Hypoxemia  Pneumonia?  -CT scan 10/41/24 mild patchy and consolidative airspace opacities in the right lower lobe with small adjacent pleural effusion new from previous comparison. Differential includes atelectasis versus early developing  infection. -  -On IV antibiotics as above which should cover for pneumonia as well  -Incentive parameter  -Strict n.p.o. currently due to concern for possible aspiration.  Speech therapy consulted.  May need culture if remains strict NPO pending speech therapy evaluation.  -Wean off oxygen as able, O2 saturation goal 90% and above          ESRD on HD  -Electrolytes acutely stable.    -Nephrology consult      Gastritis  Abdominal pain  -EGD 10/28/2024 showed gastritis  -Continue PPI tw    Hypothyroidism  -Continue levothyroxine once able to take p.o.  -TSH 10/28/2024 was normal      Diabetes mellitus  -SSI.  Hypoglycemic protocol      Anemia of chronic disease  -Hemoglobin stable around 8.  -  Monitor and transfuse as indicated for symptomatic anemia or hemoglobin less than 7        DVT prophylaxis: Initiate subcu heparin  CODE STATUS: Full code, palliative care consulted for goals of care discussion  Disposition: To be determined    I discussed the patient's findings and my recommendations with     VTE Prophylaxis - SCDs.  Code Status - Full code.       Fela Snyder MD  [facility]  11/01/24  10:29 EDT

## 2024-11-02 ENCOUNTER — APPOINTMENT (OUTPATIENT)
Dept: MRI IMAGING | Facility: HOSPITAL | Age: 79
DRG: 551 | End: 2024-11-02
Payer: MEDICARE

## 2024-11-02 LAB
ALBUMIN SERPL-MCNC: 3 G/DL (ref 3.5–5.2)
ANION GAP SERPL CALCULATED.3IONS-SCNC: 15 MMOL/L (ref 5–15)
ANISOCYTOSIS BLD QL: NORMAL
BASOPHILS # BLD AUTO: 0.01 10*3/MM3 (ref 0–0.2)
BASOPHILS NFR BLD AUTO: 0.1 % (ref 0–1.5)
BUN SERPL-MCNC: 18 MG/DL (ref 8–23)
BUN/CREAT SERPL: 5.2 (ref 7–25)
CALCIUM SPEC-SCNC: 8.7 MG/DL (ref 8.6–10.5)
CHLORIDE SERPL-SCNC: 98 MMOL/L (ref 98–107)
CO2 SERPL-SCNC: 18 MMOL/L (ref 22–29)
CREAT SERPL-MCNC: 3.49 MG/DL (ref 0.76–1.27)
DEPRECATED RDW RBC AUTO: 49.5 FL (ref 37–54)
EGFRCR SERPLBLD CKD-EPI 2021: 17.2 ML/MIN/1.73
EOSINOPHIL # BLD AUTO: 0.37 10*3/MM3 (ref 0–0.4)
EOSINOPHIL NFR BLD AUTO: 5.2 % (ref 0.3–6.2)
ERYTHROCYTE [DISTWIDTH] IN BLOOD BY AUTOMATED COUNT: 14.5 % (ref 12.3–15.4)
GLUCOSE BLDC GLUCOMTR-MCNC: 106 MG/DL (ref 70–130)
GLUCOSE BLDC GLUCOMTR-MCNC: 81 MG/DL (ref 70–130)
GLUCOSE BLDC GLUCOMTR-MCNC: 83 MG/DL (ref 70–130)
GLUCOSE SERPL-MCNC: 89 MG/DL (ref 65–99)
HCT VFR BLD AUTO: 27.4 % (ref 37.5–51)
HGB BLD-MCNC: 8.1 G/DL (ref 13–17.7)
IMM GRANULOCYTES # BLD AUTO: 0.03 10*3/MM3 (ref 0–0.05)
IMM GRANULOCYTES NFR BLD AUTO: 0.4 % (ref 0–0.5)
LYMPHOCYTES # BLD AUTO: 0.82 10*3/MM3 (ref 0.7–3.1)
LYMPHOCYTES NFR BLD AUTO: 11.6 % (ref 19.6–45.3)
MCH RBC QN AUTO: 28.2 PG (ref 26.6–33)
MCHC RBC AUTO-ENTMCNC: 29.6 G/DL (ref 31.5–35.7)
MCV RBC AUTO: 95.5 FL (ref 79–97)
MONOCYTES # BLD AUTO: 0.67 10*3/MM3 (ref 0.1–0.9)
MONOCYTES NFR BLD AUTO: 9.4 % (ref 5–12)
NEUTROPHILS NFR BLD AUTO: 5.19 10*3/MM3 (ref 1.7–7)
NEUTROPHILS NFR BLD AUTO: 73.3 % (ref 42.7–76)
NRBC BLD AUTO-RTO: 0 /100 WBC (ref 0–0.2)
PHOSPHATE SERPL-MCNC: 4.2 MG/DL (ref 2.5–4.5)
PLAT MORPH BLD: NORMAL
PLATELET # BLD AUTO: 150 10*3/MM3 (ref 140–450)
PMV BLD AUTO: 10.7 FL (ref 6–12)
POTASSIUM SERPL-SCNC: 4.2 MMOL/L (ref 3.5–5.2)
RBC # BLD AUTO: 2.87 10*6/MM3 (ref 4.14–5.8)
SODIUM SERPL-SCNC: 131 MMOL/L (ref 136–145)
WBC MORPH BLD: NORMAL
WBC NRBC COR # BLD AUTO: 7.09 10*3/MM3 (ref 3.4–10.8)

## 2024-11-02 PROCEDURE — 25010000002 HYDROMORPHONE 1 MG/ML SOLUTION: Performed by: STUDENT IN AN ORGANIZED HEALTH CARE EDUCATION/TRAINING PROGRAM

## 2024-11-02 PROCEDURE — 72158 MRI LUMBAR SPINE W/O & W/DYE: CPT

## 2024-11-02 PROCEDURE — 0JPT0XZ REMOVAL OF TUNNELED VASCULAR ACCESS DEVICE FROM TRUNK SUBCUTANEOUS TISSUE AND FASCIA, OPEN APPROACH: ICD-10-PCS | Performed by: SURGERY

## 2024-11-02 PROCEDURE — 94799 UNLISTED PULMONARY SVC/PX: CPT

## 2024-11-02 PROCEDURE — 0 GADOBUTROL 1 MMOL/ML SOLUTION: Performed by: STUDENT IN AN ORGANIZED HEALTH CARE EDUCATION/TRAINING PROGRAM

## 2024-11-02 PROCEDURE — 25010000002 OLANZAPINE 10 MG RECONSTITUTED SOLUTION: Performed by: SPECIALIST

## 2024-11-02 PROCEDURE — 94664 DEMO&/EVAL PT USE INHALER: CPT

## 2024-11-02 PROCEDURE — 25010000002 HYDROMORPHONE PER 4 MG: Performed by: STUDENT IN AN ORGANIZED HEALTH CARE EDUCATION/TRAINING PROGRAM

## 2024-11-02 PROCEDURE — 80069 RENAL FUNCTION PANEL: CPT | Performed by: INTERNAL MEDICINE

## 2024-11-02 PROCEDURE — 72141 MRI NECK SPINE W/O DYE: CPT

## 2024-11-02 PROCEDURE — 25010000002 HEPARIN (PORCINE) PER 1000 UNITS: Performed by: INTERNAL MEDICINE

## 2024-11-02 PROCEDURE — 85007 BL SMEAR W/DIFF WBC COUNT: CPT | Performed by: INTERNAL MEDICINE

## 2024-11-02 PROCEDURE — 82948 REAGENT STRIP/BLOOD GLUCOSE: CPT

## 2024-11-02 PROCEDURE — 87040 BLOOD CULTURE FOR BACTERIA: CPT | Performed by: INTERNAL MEDICINE

## 2024-11-02 PROCEDURE — 85025 COMPLETE CBC W/AUTO DIFF WBC: CPT | Performed by: INTERNAL MEDICINE

## 2024-11-02 PROCEDURE — 94761 N-INVAS EAR/PLS OXIMETRY MLT: CPT

## 2024-11-02 PROCEDURE — 25010000002 HEPARIN (PORCINE) PER 1000 UNITS: Performed by: STUDENT IN AN ORGANIZED HEALTH CARE EDUCATION/TRAINING PROGRAM

## 2024-11-02 PROCEDURE — 72157 MRI CHEST SPINE W/O & W/DYE: CPT

## 2024-11-02 PROCEDURE — 99233 SBSQ HOSP IP/OBS HIGH 50: CPT | Performed by: INTERNAL MEDICINE

## 2024-11-02 PROCEDURE — 02PY33Z REMOVAL OF INFUSION DEVICE FROM GREAT VESSEL, PERCUTANEOUS APPROACH: ICD-10-PCS | Performed by: SURGERY

## 2024-11-02 PROCEDURE — A9585 GADOBUTROL INJECTION: HCPCS | Performed by: STUDENT IN AN ORGANIZED HEALTH CARE EDUCATION/TRAINING PROGRAM

## 2024-11-02 RX ORDER — LABETALOL HYDROCHLORIDE 5 MG/ML
10 INJECTION, SOLUTION INTRAVENOUS EVERY 6 HOURS PRN
Status: DISCONTINUED | OUTPATIENT
Start: 2024-11-02 | End: 2024-11-18 | Stop reason: HOSPADM

## 2024-11-02 RX ORDER — PANTOPRAZOLE SODIUM 40 MG/10ML
40 INJECTION, POWDER, LYOPHILIZED, FOR SOLUTION INTRAVENOUS
Status: DISCONTINUED | OUTPATIENT
Start: 2024-11-02 | End: 2024-11-18 | Stop reason: HOSPADM

## 2024-11-02 RX ORDER — HEPARIN SODIUM 1000 [USP'U]/ML
3800 INJECTION, SOLUTION INTRAVENOUS; SUBCUTANEOUS AS NEEDED
Status: DISCONTINUED | OUTPATIENT
Start: 2024-11-02 | End: 2024-11-18 | Stop reason: HOSPADM

## 2024-11-02 RX ORDER — DEXTROSE MONOHYDRATE AND SODIUM CHLORIDE 5; .9 G/100ML; G/100ML
50 INJECTION, SOLUTION INTRAVENOUS CONTINUOUS
Status: DISCONTINUED | OUTPATIENT
Start: 2024-11-02 | End: 2024-11-03

## 2024-11-02 RX ORDER — GADOBUTROL 604.72 MG/ML
7 INJECTION INTRAVENOUS
Status: COMPLETED | OUTPATIENT
Start: 2024-11-02 | End: 2024-11-02

## 2024-11-02 RX ADMIN — HEPARIN SODIUM 3800 UNITS: 1000 INJECTION, SOLUTION INTRAVENOUS; SUBCUTANEOUS at 15:13

## 2024-11-02 RX ADMIN — HEPARIN SODIUM 5000 UNITS: 5000 INJECTION INTRAVENOUS; SUBCUTANEOUS at 20:44

## 2024-11-02 RX ADMIN — Medication 10 ML: at 16:58

## 2024-11-02 RX ADMIN — OLANZAPINE 5 MG: 10 INJECTION, POWDER, LYOPHILIZED, FOR SOLUTION INTRAMUSCULAR at 23:16

## 2024-11-02 RX ADMIN — HYDROMORPHONE HYDROCHLORIDE 0.25 MG: 1 INJECTION, SOLUTION INTRAMUSCULAR; INTRAVENOUS; SUBCUTANEOUS at 16:49

## 2024-11-02 RX ADMIN — HYDROMORPHONE HYDROCHLORIDE 0.25 MG: 1 INJECTION, SOLUTION INTRAMUSCULAR; INTRAVENOUS; SUBCUTANEOUS at 04:38

## 2024-11-02 RX ADMIN — Medication 10 ML: at 20:47

## 2024-11-02 RX ADMIN — FENTANYL 1 PATCH: 25 PATCH TRANSDERMAL at 16:53

## 2024-11-02 RX ADMIN — HEPARIN SODIUM 5000 UNITS: 5000 INJECTION INTRAVENOUS; SUBCUTANEOUS at 16:48

## 2024-11-02 RX ADMIN — GADOBUTROL 7 ML: 604.72 INJECTION INTRAVENOUS at 11:03

## 2024-11-02 RX ADMIN — ARFORMOTEROL TARTRATE 15 MCG: 15 SOLUTION RESPIRATORY (INHALATION) at 08:01

## 2024-11-02 RX ADMIN — OLANZAPINE 5 MG: 10 INJECTION, POWDER, LYOPHILIZED, FOR SOLUTION INTRAMUSCULAR at 15:08

## 2024-11-02 RX ADMIN — HYDROMORPHONE HYDROCHLORIDE 0.25 MG: 1 INJECTION, SOLUTION INTRAMUSCULAR; INTRAVENOUS; SUBCUTANEOUS at 10:03

## 2024-11-02 RX ADMIN — HEPARIN SODIUM 5000 UNITS: 5000 INJECTION INTRAVENOUS; SUBCUTANEOUS at 07:34

## 2024-11-02 RX ADMIN — PANTOPRAZOLE SODIUM 40 MG: 40 INJECTION, POWDER, FOR SOLUTION INTRAVENOUS at 16:48

## 2024-11-02 RX ADMIN — HYDROMORPHONE HYDROCHLORIDE 0.25 MG: 1 INJECTION, SOLUTION INTRAMUSCULAR; INTRAVENOUS; SUBCUTANEOUS at 19:30

## 2024-11-02 RX ADMIN — BUDESONIDE 0.5 MG: 0.5 INHALANT ORAL at 07:55

## 2024-11-02 NOTE — PLAN OF CARE
Goal Outcome Evaluation:              Outcome Evaluation: vss,a/ox1,xyprexa given for behaviors,confused and impulsive, attempted of get out of bed multiple times, sitter in room, pain managed with prn pain medication, NPO, Mouth swabs used, pt combative with care, anuria this shift. MRI planned for today,Plan of care ongoing.

## 2024-11-02 NOTE — NURSING NOTE
hd without incident or c/o. tolerated well. removed 1 L. no meds per dialysis, ZYprexa 5 mg per primary rn for restlessness, abusive language at times, attempting to get out of bed. noelle current, cdi. stable post completion of hd. seen by dr. carlin dialysis again in lieu of mri with contrast.

## 2024-11-02 NOTE — PROGRESS NOTES
ID note for discitis    Subjective: Had just received Dilaudid a few hours ago but remains sedate.    Objective:   Temp:  [97.1 °F (36.2 °C)-98.5 °F (36.9 °C)] 97.8 °F (36.6 °C)  Heart Rate:  [] 74  Resp:  [16-18] 18  BP: (102-163)/(42-80) 108/80  Chronically ill-appearing, sedated, normal respiratory effort      White blood cell 7.1  Creatinine 3.49  89 through 1 17    Microbiology:  10/31 MRSA nasal PCR negative  10/31 BCx Cons x 2     Assessment plan  Coag negative staph bacteremia   T5/6 discitis/osteomyelitis  End-stage renal disease on hemodialysis complicating above  Diabetes mellitus type II, continue glycemic control efforts to prevent/control infectious complications    Cultures from blood now with coag negative staph.  DC Rocephin.  Continue spot dosing vancomycin for ESRD.  Received 750 mg yesterday after dialysis and we will check a level ahead of next dialysis session and plan to redose then.  His inpatient dialysis schedule has not been finalized but was on dialysis 5 times a week at home.  Await MRI.  Check TTE and repeat blood cultures.

## 2024-11-02 NOTE — SIGNIFICANT NOTE
11/02/24 1019   OTHER   Discipline physical therapist   Rehab Time/Intention   Session Not Performed other (see comments)  (spoke with RN, pt continues to be combative and not appropiate for PT at this time)   Therapy Assessment/Plan (PT)   Criteria for Skilled Interventions Met (PT) yes   Recommendation   PT - Next Appointment 11/04/24

## 2024-11-02 NOTE — PROGRESS NOTES
[facility] Clinical Pharmacy Services: Vancomycin Monitoring Note    Nelson Wang is a 79 y.o. male who is on day 3 of pharmacy to dose vancomycin for bone and/or joint infection.    Previous Vancomycin Dose: Intermittent pulse dosing   Updated Cultures and Sensitivities:     Results from last 7 days   Lab Units 11/01/24  0443   VANCOMYCIN RM mcg/mL 15.10     Vitals/Labs  Ht:  ; Wt: 74.3 kg (163 lb 12.8 oz)   Temp Readings from Last 1 Encounters:   11/02/24 97.8 °F (36.6 °C) (Axillary)     Estimated Creatinine Clearance: 18.3 mL/min (A) (by C-G formula based on SCr of 3.49 mg/dL (H)).   ESRD on home dialysis 5X weekly; IP schedule TBD    Results from last 7 days   Lab Units 11/02/24  0535 11/02/24  0534 11/01/24  0444 11/01/24  0443 10/31/24  0802   CREATININE mg/dL 3.49*  --   --  6.21* 4.24*   WBC 10*3/mm3  --  7.09 8.81  --  6.40     Assessment/Plan  No current plan for HD 11/2- s/w RN. If plans change, please call pharmacy at [phone]    Current Vancomycin Dose: no dose planned for today  Next Level Date and Time: TBD pending dialysis plans + clinical course   We will continue to monitor patient changes and renal function     Thank you for involving pharmacy in this patient's care. Please contact pharmacy with any questions or concerns.       Gabby Hoff, PharmD  Clinical Pharmacist

## 2024-11-02 NOTE — PLAN OF CARE
Goal Outcome Evaluation:  Plan of Care Reviewed With: patient        Progress: improving  Outcome Evaluation: see chart for vital signs, confused and impulsive, sitter at bedside, pain symptoms managed by PRN medication, dialysis during shift and scheduled again 11/3, MRI with contrast during shift, continue plan of care

## 2024-11-02 NOTE — PLAN OF CARE
Problem: Adult Inpatient Plan of Care  Goal: Plan of Care Review  Outcome: Progressing   Goal Outcome Evaluation:           Clinical bedside swallow evaluation not performed this date. SLP not able to arouse pt. Please re- consult st when pt is more alert and able to participate in the evaluation. Thank you.

## 2024-11-02 NOTE — PROGRESS NOTES
Name: Nelson Wang ADMIT: 2024   : 1945  PCP: Domingo Bravo MD    MRN: [id number] LOS: 1 days   AGE/SEX: 79 y.o. male  ROOM: [facility]     Subjective   Subjective   Remains confused, impulsive.  Attempted to get out of the bed multiple times per report.  As needed Zyprexa administered.  This morning sleeping.  Sitter at bedside.    Review of Systems   As above  Objective   Objective   Vital Signs  Temp:  [97.1 °F (36.2 °C)-98.5 °F (36.9 °C)] 97.8 °F (36.6 °C)  Heart Rate:  [] 74  Resp:  [16-18] 18  BP: (102-163)/(42-80) 108/80  SpO2:  [92 %-97 %] 97 %  on  Flow (L/min) (Oxygen Therapy):  [1-2] 2;   Device (Oxygen Therapy): nasal cannula  Body mass index is 26.44 kg/m².  Physical Exam    General: Lying in bed, sleeping, ill-appearing,  HEENT: Normocephalic, atraumatic  CV: Regular rate and rhythm, no murmurs r  Lungs: Diminished, no wheezing  Abdomen: Soft, nontender, nondistended  Extremities: No significant peripheral edema , no cyanosis     Results Review     I reviewed the patient's new clinical results.  Results from last 7 days   Lab Units 24  0534 11/01/24  0444 10/31/24  0802 10/30/24  043   WBC 10*3/mm3 7.09 8.81 6.40 6.52   HEMOGLOBIN g/dL 8.1* 8.2* 8.1* 8.0*   PLATELETS 10*3/mm3 150 163 176 191     Results from last 7 days   Lab Units 24  0535 243 10/31/24  0802 10/30/24  043   SODIUM mmol/L 131* 133* 132* 130*   POTASSIUM mmol/L 4.2 4.8 4.4 4.3   CHLORIDE mmol/L 98 98 95* 92*   CO2 mmol/L 18.0* 22.6 23.6 26.6   BUN mg/dL 18 32* 21 34*   CREATININE mg/dL 3.49* 6.21* 4.24* 5.82*   GLUCOSE mg/dL 89 108* 117* 159*   Estimated Creatinine Clearance: 18.3 mL/min (A) (by C-G formula based on SCr of 3.49 mg/dL (H)).  Results from last 7 days   Lab Units 24  0535 24  0443 10/31/24  0802 10/30/24  0436 10/29/24  0304 10/28/24  0433   ALBUMIN g/dL 3.0* 2.8* 3.2* 3.3* 2.9* 3.2*   BILIRUBIN mg/dL  --  0.6  --  0.9 0.7 0.9   ALK PHOS U/L  --  64  --  69 65  71   AST (SGOT) U/L  --  9  --  8 8 9   ALT (SGPT) U/L  --  <5  --  <5 <5 5     Results from last 7 days   Lab Units 11/02/24  0535 11/01/24  0443 10/31/24  0802 10/30/24  0436 10/29/24  0304   CALCIUM mg/dL 8.7 8.5* 8.9 8.8 8.4*   ALBUMIN g/dL 3.0* 2.8* 3.2* 3.3* 2.9*   MAGNESIUM mg/dL  --  2.1 1.9 1.9 1.9   PHOSPHORUS mg/dL 4.2  --  4.0 3.5 3.4     Results from last 7 days   Lab Units 11/01/24  0444 10/31/24  0802 10/30/24  0436   PROCALCITONIN ng/mL  --  0.39* 0.31*   LACTATE mmol/L 0.9  --   --      COVID19   Date Value Ref Range Status   10/08/2024 Not Detected Not Detected - Ref. Range Final   09/28/2024 Not Detected Not Detected - Ref. Range Final     Glucose   Date/Time Value Ref Range Status   11/02/2024 0633 106 70 - 130 mg/dL Final   11/01/2024 2347 93 70 - 130 mg/dL Final   11/01/2024 1605 92 70 - 130 mg/dL Final   11/01/2024 1152 117 70 - 130 mg/dL Final   11/01/2024 0714 109 70 - 130 mg/dL Final   11/01/2024 0612 117 70 - 130 mg/dL Final           CT Abdomen Pelvis With Contrast  Narrative: CT ABDOMEN PELVIS W CONTRAST, CT CHEST W CONTRAST DIAGNOSTIC    Date of Exam: 10/31/2024 5:30 PM EDT    Indication: Ongoing GI discomfort unrelenting, Dr Peraza requesting CT abd/pel with contrast.    Comparison: CT chest 10/8/2024, CT abdomen pelvis 9/28/2024    Technique: Axial CT images were obtained of the chest, abdomen and pelvis after the uneventful intravenous administration of iodinated contrast. Reconstructed coronal and sagittal images were also obtained. Automated exposure control and iterative   construction methods were used.    Findings:  Motion-degraded exam.    CT chest    Atrophic thyroid. No overtly suspicious supraclavicular adenopathy. Right dialysis catheter in standard position. Aortic atherosclerotic disease without aneurysm. Cardiomegaly. No significant pericardial effusion. Esophagus unremarkable. The main   pulmonary artery is dilated up to 3.8 cm similar to prior. No large central filling  defect, contrast timing and motion limits evaluation for more peripheral emboli.    Few prominent but stable mediastinal lymph nodes favor reactive. There is fluid/debris in the mid thoracic esophagus to the level of the trachea. Expiratory phase of imaging. Mild peribronchial thickening. Small right pleural effusion new from prior   exam. There is increasing adjacent patchy/mild consolidative opacities in the right lower lobe. Increasing subpleural consolidation in the lingula, appearance and homogeneous enhancement favors atelectasis.. No overt pulmonary edema. No pneumothorax. No   overtly suspicious pulmonary nodule.    No axillary adenopathy. Symmetric gynecomastia. Few chronic nondisplaced left rib deformities. Endplate irregularity at T5-T6, including somewhat mottled lucent appearance of the bony endplates. This is new from 10/8/2024. Associated fat stranding in the   posterior mediastinum without organized or gas-containing collection. Few mild chronic compression deformities in the lower thoracic spine stable from prior. Multilevel degenerative endplate change without acute displaced fracture.    CT abdomen/pelvis    Normal size and contour of liver and spleen. No focal hepatic lesion. Portal vasculature appears patent. Gallbladder and biliary tree unremarkable. No active pancreatitis or drainable peripancreatic collection. Spleen is homogeneous.    Mild gastrorenal collaterals of uncertain significance in isolation. No suspicious adrenal nodule. Atrophic kidneys. Symmetric renal enhancement. Multiple low-density renal cysts likely related to chronic renal disease and/or dialysis. There is a   nonobstructing 1.1 cm calculus in the right renal pelvis stable from prior. No hydronephrosis. Urinary bladder grossly unremarkable. Borderline prostamegaly.    Expected configuration stomach and duodenum. Colonic diverticulosis. No evidence of bowel obstruction or active inflammation. No CT evidence of acute  appendicitis. There is extensive abdominal aortic atherosclerotic disease without aneurysm.    No suspicious adenopathy. No ascites, free air or drainable collection. Mild body wall edema. Right hip prosthesis. Degenerative related changes throughout the spine including chronic compression fractures at L3 with stable 5 mm bony retropulsion and T12   with 3 mm bony retropulsion. No acute displaced fracture or aggressive bone lesion.  Impression: Impression:  Motion-degraded exam    CT CHEST:  1. New irregular endplate changes at T5-T6 since 10/8/2024 comparison, appearance suspicious for discitis/osteomyelitis. Adjacent fat stranding in the posterior mediastinum without organized/drainable fluid collection. Consider infectious disease   consultation and further assessment with MRI of thoracic spine without and with IV contrast (if possible).  2. Mild patchy and consolidative airspace opacities in the right lower lobe with small adjacent pleural effusion new from previous comparison. Differential includes atelectasis versus early developing infection. Recommend clinical correlation.  3. Cardiomegaly. Aortic and coronary atherosclerotic disease.  4. Dilated main pulmonary artery which can be seen with pulmonary arterial hypertension.  5. Fluid/debris in the mid thoracic esophagus. Recommend aspiration precautions.    CT ABDOMEN/PELVIS:  1. No convincing acute intra-abdominal process. Specifically no acute GI related abnormality by CT.  2. Nonobstructing 1.1 cm stone in the right renal pelvis similar to prior. No hydronephrosis. Atrophic kidneys with multiple renal cysts related to chronic renal disease and/or dialysis.  3. Mild body wall edema.  4. Other chronic/ancillary findings as above.    Findings and recommendations regarding suspected discitis osteomyelitis communicated over the phone with patient's nurse (Patriica Rios) at 8:21 p.m. 10/31/2024.    Electronically Signed: Lorne Bustamante MD    10/31/2024 8:22 PM  EDT    Workstation ID: [id number]  CT Chest With Contrast Diagnostic  Narrative: CT ABDOMEN PELVIS W CONTRAST, CT CHEST W CONTRAST DIAGNOSTIC    Date of Exam: 10/31/2024 5:30 PM EDT    Indication: Ongoing GI discomfort unrelenting, Dr Peraza requesting CT abd/pel with contrast.    Comparison: CT chest 10/8/2024, CT abdomen pelvis 9/28/2024    Technique: Axial CT images were obtained of the chest, abdomen and pelvis after the uneventful intravenous administration of iodinated contrast. Reconstructed coronal and sagittal images were also obtained. Automated exposure control and iterative   construction methods were used.    Findings:  Motion-degraded exam.    CT chest    Atrophic thyroid. No overtly suspicious supraclavicular adenopathy. Right dialysis catheter in standard position. Aortic atherosclerotic disease without aneurysm. Cardiomegaly. No significant pericardial effusion. Esophagus unremarkable. The main   pulmonary artery is dilated up to 3.8 cm similar to prior. No large central filling defect, contrast timing and motion limits evaluation for more peripheral emboli.    Few prominent but stable mediastinal lymph nodes favor reactive. There is fluid/debris in the mid thoracic esophagus to the level of the trachea. Expiratory phase of imaging. Mild peribronchial thickening. Small right pleural effusion new from prior   exam. There is increasing adjacent patchy/mild consolidative opacities in the right lower lobe. Increasing subpleural consolidation in the lingula, appearance and homogeneous enhancement favors atelectasis.. No overt pulmonary edema. No pneumothorax. No   overtly suspicious pulmonary nodule.    No axillary adenopathy. Symmetric gynecomastia. Few chronic nondisplaced left rib deformities. Endplate irregularity at T5-T6, including somewhat mottled lucent appearance of the bony endplates. This is new from 10/8/2024. Associated fat stranding in the   posterior mediastinum without organized or  gas-containing collection. Few mild chronic compression deformities in the lower thoracic spine stable from prior. Multilevel degenerative endplate change without acute displaced fracture.    CT abdomen/pelvis    Normal size and contour of liver and spleen. No focal hepatic lesion. Portal vasculature appears patent. Gallbladder and biliary tree unremarkable. No active pancreatitis or drainable peripancreatic collection. Spleen is homogeneous.    Mild gastrorenal collaterals of uncertain significance in isolation. No suspicious adrenal nodule. Atrophic kidneys. Symmetric renal enhancement. Multiple low-density renal cysts likely related to chronic renal disease and/or dialysis. There is a   nonobstructing 1.1 cm calculus in the right renal pelvis stable from prior. No hydronephrosis. Urinary bladder grossly unremarkable. Borderline prostamegaly.    Expected configuration stomach and duodenum. Colonic diverticulosis. No evidence of bowel obstruction or active inflammation. No CT evidence of acute appendicitis. There is extensive abdominal aortic atherosclerotic disease without aneurysm.    No suspicious adenopathy. No ascites, free air or drainable collection. Mild body wall edema. Right hip prosthesis. Degenerative related changes throughout the spine including chronic compression fractures at L3 with stable 5 mm bony retropulsion and T12   with 3 mm bony retropulsion. No acute displaced fracture or aggressive bone lesion.  Impression: Impression:  Motion-degraded exam    CT CHEST:  1. New irregular endplate changes at T5-T6 since 10/8/2024 comparison, appearance suspicious for discitis/osteomyelitis. Adjacent fat stranding in the posterior mediastinum without organized/drainable fluid collection. Consider infectious disease   consultation and further assessment with MRI of thoracic spine without and with IV contrast (if possible).  2. Mild patchy and consolidative airspace opacities in the right lower lobe with  small adjacent pleural effusion new from previous comparison. Differential includes atelectasis versus early developing infection. Recommend clinical correlation.  3. Cardiomegaly. Aortic and coronary atherosclerotic disease.  4. Dilated main pulmonary artery which can be seen with pulmonary arterial hypertension.  5. Fluid/debris in the mid thoracic esophagus. Recommend aspiration precautions.    CT ABDOMEN/PELVIS:  1. No convincing acute intra-abdominal process. Specifically no acute GI related abnormality by CT.  2. Nonobstructing 1.1 cm stone in the right renal pelvis similar to prior. No hydronephrosis. Atrophic kidneys with multiple renal cysts related to chronic renal disease and/or dialysis.  3. Mild body wall edema.  4. Other chronic/ancillary findings as above.    Findings and recommendations regarding suspected discitis osteomyelitis communicated over the phone with patient's nurse (Patricia Rios) at 8:21 p.m. 10/31/2024.    Electronically Signed: Lorne Bustamante MD    10/31/2024 8:22 PM EDT    Workstation ID: [id number]  XR Chest 1 View  Narrative: XR CHEST 1 VW    Date of Exam: 10/31/2024 8:10 AM EDT    Indication: Shortness of breath    Comparison: 10/27/2024  Findings:  Right double-lumen catheter is unchanged in position. There are low lung volumes with poor inspiration and mild atelectasis of the lung bases, greatest on the left. There is stable cardiomegaly.  Impression: Impression:  Poor inspiration with mild bibasilar atelectasis, greatest on the left.    Electronically Signed: Lois Balbuena MD    10/31/2024 8:42 AM EDT    Workstation ID: [id number]    Scheduled Medications  amitriptyline, 10 mg, Oral, Nightly  arformoterol, 15 mcg, Nebulization, BID - RT  atorvastatin, 40 mg, Oral, Daily  budesonide, 0.5 mg, Nebulization, BID - RT  carvedilol, 3.125 mg, Oral, QAM  fentaNYL, 1 patch, Transdermal, Q72H   And  Check Fentanyl Patch Placement, 1 each, Does not apply, Q12H  heparin (porcine), 5,000  Units, Subcutaneous, Q8H  insulin regular, 2-7 Units, Subcutaneous, Q6H  levothyroxine, 175 mcg, Oral, Q AM  pantoprazole, 40 mg, Intravenous, BID AC  risperiDONE, 1 mg, Oral, BID  sertraline, 100 mg, Oral, Nightly  sodium chloride, 10 mL, Intravenous, Q12H  torsemide, 100 mg, Oral, Daily  Vancomycin Pharmacy Intermittent/Pulse Dosing, , Does not apply, Daily    Infusions  Pharmacy to dose vancomycin,     Diet  NPO Diet NPO Type: Strict NPO    I have personally reviewed     [x]  Laboratory   [x]  Microbiology   [x]  Radiology   [x]  EKG/Telemetry  []  Cardiology/Vascular   []  Pathology    []  Records       Assessment/Plan     Active Hospital Problems    Diagnosis  POA    **Discitis of lumbar region [M46.46]  Yes    Discitis [M46.40]  Yes    Metabolic encephalopathy [G93.41]  Yes    Aspiration pneumonia [J69.0]  Yes    ESRD on dialysis [N18.6, Z99.2]  Not Applicable    Bipolar disorder [F31.9]  Yes    Chronic respiratory failure with hypoxia [J96.11]  Yes    Essential hypertension [I10]  Yes    Anemia due to chronic kidney disease [N18.9, D63.1]  Yes    Type 2 diabetes mellitus without complication [E11.9]  Yes      Resolved Hospital Problems   No resolved problems to display.       Coag negative staph acteremia  T5-T6 discitis/osteomyelitis.   -Blood cultures 10/31/2024 positive for coag negative staph  -IV ceftriaxone discontinued, IV vancomycin continued per ID  -MRI of the cervical spine/thoracic/lumbar spine pending  -Echocardiogram pending  -Neurosurgery following     Metabolic encephalopathy  Hallucinations  Bipolar disorder  -CT head 10/8/2024 with no acute findings  -Psychiatry following, adjusting medications  -Sitter at bedside     Hypoxemia  Pneumonia?  -CT scan 10/41/24 mild patchy and consolidative airspace opacities in the right lower lobe with small adjacent pleural effusion new from previous comparison. Differential includes atelectasis versus early developing infection. -  -On IV antibiotics as  above which should cover for pneumonia as well  -Incentive parameter  -Strict n.p.o. currently due to concern for possible aspiration.  Speech therapy consult pending.  May need cortrack if remains strict NPO pending speech therapy evaluation.  -Wean off oxygen as able, O2 saturation goal 90% and above              ESRD on HD  -Electrolytes acutely stable.    -Nephrology and dialysis        Gastritis  Abdominal pain  -EGD 10/28/2024 showed gastritis  -IV PPI twice daily     Hypothyroidism  -Continue levothyroxine once able to take p.o.  -TSH 10/28/2024 was normal        Diabetes mellitus  -SSI.  Hypoglycemic protocol        Anemia of chronic disease  -Hemoglobin stable around 8.  -Monitor and transfuse as indicated for symptomatic anemia or hemoglobin less than 7      DVT prophylaxis.  DVT prophylaxis  Full code.  Palliative care consulted.    Expected Discharge Date: 11/4/2024; Expected Discharge Time:        Copied text in this note has been reviewed and is accurate as of 11/02/24.         Dictated utilizing Dragon dictation        Fela Snyder MD  [facility]  11/02/24  09:23 EDT

## 2024-11-02 NOTE — PROGRESS NOTES
[facility] Progress Note      Patient Name: Nelson Wang  : 1945  MRN: [id number]  Primary Care Physician:  Domingo Bravo MD  Date of admission: 2024    Subjective     Interval History:   F/u ESRD     Review of Systems:   Just completed HD  Is highly confused & agitated     Objective     Vitals:   Temp:  [97.1 °F (36.2 °C)-98.5 °F (36.9 °C)] 98.2 °F (36.8 °C)  Heart Rate:  [] 75  Resp:  [16-18] 16  BP: (102-163)/(42-80) 120/44  Flow (L/min) (Oxygen Therapy):  [1-2] 2    Intake/Output Summary (Last 24 hours) at 2024 1506  Last data filed at 2024 0220  Gross per 24 hour   Intake 250 ml   Output 1500 ml   Net -1250 ml       Physical Exam:    General Appearance: frail ill appearing M agitated   Neck: supple, no JVD  Lungs: CTA bilat   Heart: RRR, normal S1 and S2  Abdomen: soft, nontender, nondistended  Extremities: no edema, cyanosis or clubbing       Scheduled Meds:     amitriptyline, 10 mg, Oral, Nightly  arformoterol, 15 mcg, Nebulization, BID - RT  atorvastatin, 40 mg, Oral, Daily  budesonide, 0.5 mg, Nebulization, BID - RT  carvedilol, 3.125 mg, Oral, QAM  fentaNYL, 1 patch, Transdermal, Q72H   And  Check Fentanyl Patch Placement, 1 each, Does not apply, Q12H  heparin (porcine), 5,000 Units, Subcutaneous, Q8H  insulin regular, 2-7 Units, Subcutaneous, Q6H  levothyroxine, 175 mcg, Oral, Q AM  pantoprazole, 40 mg, Intravenous, BID AC  risperiDONE, 1 mg, Oral, BID  sertraline, 100 mg, Oral, Nightly  sodium chloride, 10 mL, Intravenous, Q12H  torsemide, 100 mg, Oral, Daily  Vancomycin Pharmacy Intermittent/Pulse Dosing, , Does not apply, Daily      IV Meds:   Pharmacy to dose vancomycin,         Results Reviewed:   I have personally reviewed the results from the time of this admission to 2024 15:06 EDT     Results from last 7 days   Lab Units 24  0535 24  0443 10/31/24  0802 10/30/24  0436 10/29/24  0304   SODIUM mmol/L 131* 133* 132*  130* 130*   POTASSIUM mmol/L 4.2 4.8 4.4 4.3 4.1   CHLORIDE mmol/L 98 98 95* 92* 94*   CO2 mmol/L 18.0* 22.6 23.6 26.6 27.3   BUN mg/dL 18 32* 21 34* 25*   CREATININE mg/dL 3.49* 6.21* 4.24* 5.82* 4.40*   CALCIUM mg/dL 8.7 8.5* 8.9 8.8 8.4*   BILIRUBIN mg/dL  --  0.6  --  0.9 0.7   ALK PHOS U/L  --  64  --  69 65   ALT (SGPT) U/L  --  <5  --  <5 <5   AST (SGOT) U/L  --  9  --  8 8   GLUCOSE mg/dL 89 108* 117* 159* 209*     Estimated Creatinine Clearance: 18.3 mL/min (A) (by C-G formula based on SCr of 3.49 mg/dL (H)).  Results from last 7 days   Lab Units 11/02/24  0535 11/01/24 0443 10/31/24  0802 10/30/24  0436   MAGNESIUM mg/dL  --  2.1 1.9 1.9   PHOSPHORUS mg/dL 4.2  --  4.0 3.5         Results from last 7 days   Lab Units 11/02/24  0534 11/01/24  0444 10/31/24  0802 10/30/24  0436 10/29/24  1137 10/29/24  0304   WBC 10*3/mm3 7.09 8.81 6.40 6.52  --  6.36   HEMOGLOBIN g/dL 8.1* 8.2* 8.1* 8.0* 8.2* 6.8*   PLATELETS 10*3/mm3 150 163 176 191  --  197           Assessment / Plan     ASSESSMENT:  End-stage renal disease - been on home hemodialysis receiving 5 times a week treatment via left upper arm AV fistula which is not ready for use, still using the right IJ tunneled dialysis catheter.  His electrolyte and volume status within acceptable  Anemia of chronic kidney disease on long-acting RONALD his hemoglobin today is 8.1  Hypertension with chronic kidney disease reasonably controlled  Hypothyroidism on replacement therapy  Mild hypervolemic hyponatremia, sodium is 131  Acute encephalopathy   T5/6 discitis/osteomyelitis - ID & NSEG following.  MRI with contrast today  Cog neg staph bacteremia - ID following, on vanc in place of rocephin     PLAN:  HD today and tomorrow post gadolinium   D/w dialysis RN    Rigo Oliva MD  11/02/24  15:06 EDT    [facility]  [phone]

## 2024-11-03 LAB
ANION GAP SERPL CALCULATED.3IONS-SCNC: 13 MMOL/L (ref 5–15)
BACTERIA SPEC AEROBE CULT: ABNORMAL
BACTERIA SPEC AEROBE CULT: ABNORMAL
BASOPHILS # BLD AUTO: 0.01 10*3/MM3 (ref 0–0.2)
BASOPHILS NFR BLD AUTO: 0.2 % (ref 0–1.5)
BUN SERPL-MCNC: 13 MG/DL (ref 8–23)
BUN/CREAT SERPL: 4.4 (ref 7–25)
CALCIUM SPEC-SCNC: 8.3 MG/DL (ref 8.6–10.5)
CHLORIDE SERPL-SCNC: 103 MMOL/L (ref 98–107)
CO2 SERPL-SCNC: 22 MMOL/L (ref 22–29)
CREAT SERPL-MCNC: 2.93 MG/DL (ref 0.76–1.27)
DEPRECATED RDW RBC AUTO: 46.8 FL (ref 37–54)
EGFRCR SERPLBLD CKD-EPI 2021: 21.2 ML/MIN/1.73
EOSINOPHIL # BLD AUTO: 0.32 10*3/MM3 (ref 0–0.4)
EOSINOPHIL NFR BLD AUTO: 5.3 % (ref 0.3–6.2)
ERYTHROCYTE [DISTWIDTH] IN BLOOD BY AUTOMATED COUNT: 13.9 % (ref 12.3–15.4)
GLUCOSE BLDC GLUCOMTR-MCNC: 106 MG/DL (ref 70–130)
GLUCOSE BLDC GLUCOMTR-MCNC: 111 MG/DL (ref 70–130)
GLUCOSE BLDC GLUCOMTR-MCNC: 114 MG/DL (ref 70–130)
GLUCOSE BLDC GLUCOMTR-MCNC: 131 MG/DL (ref 70–130)
GLUCOSE SERPL-MCNC: 360 MG/DL (ref 65–99)
GRAM STN SPEC: ABNORMAL
GRAM STN SPEC: ABNORMAL
HCT VFR BLD AUTO: 27.6 % (ref 37.5–51)
HGB BLD-MCNC: 8.4 G/DL (ref 13–17.7)
IMM GRANULOCYTES # BLD AUTO: 0.02 10*3/MM3 (ref 0–0.05)
IMM GRANULOCYTES NFR BLD AUTO: 0.3 % (ref 0–0.5)
ISOLATED FROM: ABNORMAL
ISOLATED FROM: ABNORMAL
LYMPHOCYTES # BLD AUTO: 1.26 10*3/MM3 (ref 0.7–3.1)
LYMPHOCYTES NFR BLD AUTO: 20.9 % (ref 19.6–45.3)
MAGNESIUM SERPL-MCNC: 2.2 MG/DL (ref 1.6–2.4)
MCH RBC QN AUTO: 28.1 PG (ref 26.6–33)
MCHC RBC AUTO-ENTMCNC: 30.4 G/DL (ref 31.5–35.7)
MCV RBC AUTO: 92.3 FL (ref 79–97)
MONOCYTES # BLD AUTO: 0.78 10*3/MM3 (ref 0.1–0.9)
MONOCYTES NFR BLD AUTO: 12.9 % (ref 5–12)
NEUTROPHILS NFR BLD AUTO: 3.64 10*3/MM3 (ref 1.7–7)
NEUTROPHILS NFR BLD AUTO: 60.4 % (ref 42.7–76)
NRBC BLD AUTO-RTO: 0 /100 WBC (ref 0–0.2)
PLATELET # BLD AUTO: 164 10*3/MM3 (ref 140–450)
PMV BLD AUTO: 9.8 FL (ref 6–12)
POTASSIUM SERPL-SCNC: 3.6 MMOL/L (ref 3.5–5.2)
RBC # BLD AUTO: 2.99 10*6/MM3 (ref 4.14–5.8)
SODIUM SERPL-SCNC: 138 MMOL/L (ref 136–145)
VANCOMYCIN SERPL-MCNC: 13.2 MCG/ML (ref 5–40)
WBC NRBC COR # BLD AUTO: 6.03 10*3/MM3 (ref 3.4–10.8)

## 2024-11-03 PROCEDURE — 25010000002 HEPARIN (PORCINE) PER 1000 UNITS: Performed by: STUDENT IN AN ORGANIZED HEALTH CARE EDUCATION/TRAINING PROGRAM

## 2024-11-03 PROCEDURE — 99222 1ST HOSP IP/OBS MODERATE 55: CPT | Performed by: SURGERY

## 2024-11-03 PROCEDURE — 25010000002 HEPARIN (PORCINE) PER 1000 UNITS: Performed by: INTERNAL MEDICINE

## 2024-11-03 PROCEDURE — 25010000002 LIDOCAINE 1% - EPINEPHRINE 1:100000 1 %-1:100000 SOLUTION: Performed by: SURGERY

## 2024-11-03 PROCEDURE — 83735 ASSAY OF MAGNESIUM: CPT | Performed by: STUDENT IN AN ORGANIZED HEALTH CARE EDUCATION/TRAINING PROGRAM

## 2024-11-03 PROCEDURE — 99233 SBSQ HOSP IP/OBS HIGH 50: CPT | Performed by: INTERNAL MEDICINE

## 2024-11-03 PROCEDURE — 85025 COMPLETE CBC W/AUTO DIFF WBC: CPT | Performed by: INTERNAL MEDICINE

## 2024-11-03 PROCEDURE — 25010000002 HYDROMORPHONE PER 4 MG: Performed by: STUDENT IN AN ORGANIZED HEALTH CARE EDUCATION/TRAINING PROGRAM

## 2024-11-03 PROCEDURE — 80048 BASIC METABOLIC PNL TOTAL CA: CPT | Performed by: INTERNAL MEDICINE

## 2024-11-03 PROCEDURE — 25810000003 SODIUM CHLORIDE 0.9 % SOLUTION 250 ML FLEX CONT: Performed by: NURSE PRACTITIONER

## 2024-11-03 PROCEDURE — 25810000003 DEXTROSE 5 % AND SODIUM CHLORIDE 0.9 % 5-0.9 % SOLUTION: Performed by: STUDENT IN AN ORGANIZED HEALTH CARE EDUCATION/TRAINING PROGRAM

## 2024-11-03 PROCEDURE — 94761 N-INVAS EAR/PLS OXIMETRY MLT: CPT

## 2024-11-03 PROCEDURE — 94799 UNLISTED PULMONARY SVC/PX: CPT

## 2024-11-03 PROCEDURE — 80202 ASSAY OF VANCOMYCIN: CPT | Performed by: STUDENT IN AN ORGANIZED HEALTH CARE EDUCATION/TRAINING PROGRAM

## 2024-11-03 PROCEDURE — 25010000002 OLANZAPINE 10 MG RECONSTITUTED SOLUTION: Performed by: SPECIALIST

## 2024-11-03 PROCEDURE — 82948 REAGENT STRIP/BLOOD GLUCOSE: CPT

## 2024-11-03 PROCEDURE — 36589 REMOVAL TUNNELED CV CATH: CPT | Performed by: SURGERY

## 2024-11-03 PROCEDURE — 25010000002 HYDROMORPHONE PER 4 MG: Performed by: NURSE PRACTITIONER

## 2024-11-03 PROCEDURE — 94664 DEMO&/EVAL PT USE INHALER: CPT

## 2024-11-03 PROCEDURE — 99231 SBSQ HOSP IP/OBS SF/LOW 25: CPT | Performed by: STUDENT IN AN ORGANIZED HEALTH CARE EDUCATION/TRAINING PROGRAM

## 2024-11-03 PROCEDURE — 25010000002 VANCOMYCIN 750 MG RECONSTITUTED SOLUTION 1 EACH VIAL: Performed by: NURSE PRACTITIONER

## 2024-11-03 RX ORDER — LIDOCAINE HYDROCHLORIDE AND EPINEPHRINE 10; 10 MG/ML; UG/ML
30 INJECTION, SOLUTION INFILTRATION; PERINEURAL ONCE
Status: COMPLETED | OUTPATIENT
Start: 2024-11-03 | End: 2024-11-03

## 2024-11-03 RX ORDER — RISPERIDONE 2 MG/1
2 TABLET ORAL 2 TIMES DAILY
Status: DISCONTINUED | OUTPATIENT
Start: 2024-11-03 | End: 2024-11-03

## 2024-11-03 RX ORDER — CARVEDILOL 3.12 MG/1
3.12 TABLET ORAL EVERY 12 HOURS SCHEDULED
Status: DISCONTINUED | OUTPATIENT
Start: 2024-11-03 | End: 2024-11-07

## 2024-11-03 RX ORDER — GABAPENTIN 100 MG/1
100 CAPSULE ORAL NIGHTLY
Status: DISCONTINUED | OUTPATIENT
Start: 2024-11-03 | End: 2024-11-07

## 2024-11-03 RX ORDER — HYDROMORPHONE HYDROCHLORIDE 1 MG/ML
0.5 INJECTION, SOLUTION INTRAMUSCULAR; INTRAVENOUS; SUBCUTANEOUS
Status: DISCONTINUED | OUTPATIENT
Start: 2024-11-03 | End: 2024-11-05

## 2024-11-03 RX ORDER — RISPERIDONE 2 MG/1
2 TABLET ORAL NIGHTLY
Status: DISCONTINUED | OUTPATIENT
Start: 2024-11-03 | End: 2024-11-06

## 2024-11-03 RX ORDER — RISPERIDONE 2 MG/1
2 TABLET ORAL
Status: DISCONTINUED | OUTPATIENT
Start: 2024-11-03 | End: 2024-11-08

## 2024-11-03 RX ORDER — MANNITOL 250 MG/ML
12.5 INJECTION, SOLUTION INTRAVENOUS AS NEEDED
Status: ACTIVE | OUTPATIENT
Start: 2024-11-03 | End: 2024-11-04

## 2024-11-03 RX ORDER — MANNITOL 250 MG/ML
25 INJECTION, SOLUTION INTRAVENOUS AS NEEDED
Status: ACTIVE | OUTPATIENT
Start: 2024-11-03 | End: 2024-11-04

## 2024-11-03 RX ORDER — DEXTROSE MONOHYDRATE AND SODIUM CHLORIDE 5; .45 G/100ML; G/100ML
50 INJECTION, SOLUTION INTRAVENOUS CONTINUOUS
Status: ACTIVE | OUTPATIENT
Start: 2024-11-03 | End: 2024-11-04

## 2024-11-03 RX ADMIN — PANTOPRAZOLE SODIUM 40 MG: 40 INJECTION, POWDER, FOR SOLUTION INTRAVENOUS at 16:30

## 2024-11-03 RX ADMIN — HYDROMORPHONE HYDROCHLORIDE 0.25 MG: 1 INJECTION, SOLUTION INTRAMUSCULAR; INTRAVENOUS; SUBCUTANEOUS at 00:20

## 2024-11-03 RX ADMIN — HEPARIN SODIUM 5000 UNITS: 5000 INJECTION INTRAVENOUS; SUBCUTANEOUS at 13:56

## 2024-11-03 RX ADMIN — HYDROMORPHONE HYDROCHLORIDE 0.25 MG: 1 INJECTION, SOLUTION INTRAMUSCULAR; INTRAVENOUS; SUBCUTANEOUS at 23:00

## 2024-11-03 RX ADMIN — HYDROMORPHONE HYDROCHLORIDE 0.25 MG: 1 INJECTION, SOLUTION INTRAMUSCULAR; INTRAVENOUS; SUBCUTANEOUS at 13:53

## 2024-11-03 RX ADMIN — Medication 30 ML: at 14:40

## 2024-11-03 RX ADMIN — ARFORMOTEROL TARTRATE 15 MCG: 15 SOLUTION RESPIRATORY (INHALATION) at 08:23

## 2024-11-03 RX ADMIN — HYDROMORPHONE HYDROCHLORIDE 0.25 MG: 1 INJECTION, SOLUTION INTRAMUSCULAR; INTRAVENOUS; SUBCUTANEOUS at 11:25

## 2024-11-03 RX ADMIN — BUDESONIDE 0.5 MG: 0.5 INHALANT ORAL at 08:18

## 2024-11-03 RX ADMIN — HEPARIN SODIUM 5000 UNITS: 5000 INJECTION INTRAVENOUS; SUBCUTANEOUS at 05:06

## 2024-11-03 RX ADMIN — Medication 10 ML: at 08:07

## 2024-11-03 RX ADMIN — DEXTROSE AND SODIUM CHLORIDE 50 ML/HR: 5; 450 INJECTION, SOLUTION INTRAVENOUS at 18:32

## 2024-11-03 RX ADMIN — OLANZAPINE 5 MG: 10 INJECTION, POWDER, LYOPHILIZED, FOR SOLUTION INTRAMUSCULAR at 07:52

## 2024-11-03 RX ADMIN — HYDROMORPHONE HYDROCHLORIDE 0.25 MG: 1 INJECTION, SOLUTION INTRAMUSCULAR; INTRAVENOUS; SUBCUTANEOUS at 05:04

## 2024-11-03 RX ADMIN — PANTOPRAZOLE SODIUM 40 MG: 40 INJECTION, POWDER, FOR SOLUTION INTRAVENOUS at 07:53

## 2024-11-03 RX ADMIN — HEPARIN SODIUM 5000 UNITS: 5000 INJECTION INTRAVENOUS; SUBCUTANEOUS at 20:27

## 2024-11-03 RX ADMIN — VANCOMYCIN HYDROCHLORIDE 750 MG: 750 INJECTION, POWDER, LYOPHILIZED, FOR SOLUTION INTRAVENOUS at 19:24

## 2024-11-03 RX ADMIN — HYDROMORPHONE HYDROCHLORIDE 0.25 MG: 1 INJECTION, SOLUTION INTRAMUSCULAR; INTRAVENOUS; SUBCUTANEOUS at 21:09

## 2024-11-03 RX ADMIN — DEXTROSE AND SODIUM CHLORIDE 50 ML/HR: 5; 900 INJECTION, SOLUTION INTRAVENOUS at 00:27

## 2024-11-03 RX ADMIN — HEPARIN SODIUM 3800 UNITS: 1000 INJECTION, SOLUTION INTRAVENOUS; SUBCUTANEOUS at 12:09

## 2024-11-03 RX ADMIN — HYDROMORPHONE HYDROCHLORIDE 0.25 MG: 1 INJECTION, SOLUTION INTRAMUSCULAR; INTRAVENOUS; SUBCUTANEOUS at 17:08

## 2024-11-03 RX ADMIN — HYDROMORPHONE HYDROCHLORIDE 0.5 MG: 1 INJECTION, SOLUTION INTRAMUSCULAR; INTRAVENOUS; SUBCUTANEOUS at 23:28

## 2024-11-03 RX ADMIN — BUDESONIDE 0.5 MG: 0.5 INHALANT ORAL at 20:19

## 2024-11-03 NOTE — PROGRESS NOTES
The patient is undergone dialysis when seen today.  He is quite groggy and unable to participate in interview.  Staff indicates that he had a bad evening and has had increasing confusion and agitation.  I have increased the patient's Risperdal to 1 mg twice daily with breakfast and dinner with a nighttime dose of 2 mg, continuing as needed intramuscular olanzapine for breakthrough agitation.  I

## 2024-11-03 NOTE — NURSING NOTE
hd without incident or c/o. tolerated well. removed 1.5 l. no meds administered. dilaudid administered per primary rn for c/o pain during tx. drsg current, cdi. stable post completion of hd.

## 2024-11-03 NOTE — PLAN OF CARE
Goal Outcome Evaluation:              Outcome Evaluation: vss,a/ox1, pain managed with prn pain medication, confused with increased agitation theough the shift,sitter at bedside. Fentanyl patch in place, daughter stated that pt had previously been increasingly agitated on the fentanyl patch, Pt was very agitaed towards the end of shift.

## 2024-11-03 NOTE — CONSULTS
[facility]   Consult Note    Patient Name: Nelson Wang  : 1945  MRN: [id number]  Primary Care Physician:  Domingo Bravo MD  Referring Physician: EDSON Antony  Date of admission: 2024    Inpatient Vascular Surgery Consult  Consult performed by: Marquez Zaman MD  Consult ordered by: Rigo Oliva MD        Subjective   Subjective     Reason for Consult/ Chief Complaint: Bloodstream infection with tunneled catheter indwelling.    History of Present Illness  Nelson Wang is a 79 y.o. male patient admitted to the hospital with spinal discitis.  I have been asked to remove his dialysis catheter following dialysis today.  Patient upon my arrival is very confused and agitated.    Review of Systems     Personal History     Past Medical History:   Diagnosis Date    Abnormal stress test     Anemia     IRON INFUSIONS WITH DIALYSIS    Anesthesia     WITH HIP REPLACEMENT DAUGHTER BELIEVES HAD SVT     Ankle pain, right     Anxiety and depression     CKD (chronic kidney disease), stage IV     DIALYSIS [id number] SHILEY IN RIGHT CHEST    Diabetes     Gout     High cholesterol     History of peritoneal dialysis     HL (hearing loss)     Hyperkalemia     Hypertension     Hypothyroidism     Insomnia     Night terrors     Psoriasis     Psoriasis     Sleep walking     Thrombocytopenia     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    Vitiligo        Past Surgical History:   Procedure Laterality Date    ANKLE SURGERY Right 1990    APPENDECTOMY N/A     ARTERIOVENOUS FISTULA/SHUNT SURGERY Left 2024    Procedure: Creation of left arm arteriovenous fistula;  Surgeon: Moses Mir MD;  Location: [facility] MAIN OR;  Service: Vascular;  Laterality: Left;    BRONCHOSCOPY N/A 2024    Procedure: BRONCHOSCOPY WITH BAL AND WASHINGS;  Surgeon: Sandra Espinal MD;  Location: [facility] ENDOSCOPY;  Service: Pulmonary;  Laterality: N/A;  PNEUMONIA    BRONCHOSCOPY N/A 2024     Procedure: BRONCHOSCOPY WITH BALS AND WASHINGS;  Surgeon: Sandra Espinal MD;  Location: [facility] ENDOSCOPY;  Service: Pulmonary;  Laterality: N/A;  MUCOUS PLUGGING    CARDIAC CATHETERIZATION N/A 05/29/2024    Procedure: Left Heart Cath with possible coronary angioplasty;  Surgeon: Stephan Nichols MD;  Location: [facility] CATH INVASIVE LOCATION;  Service: Cardiology;  Laterality: N/A;    COLONOSCOPY N/A 06/2022    [facility]    ENDOSCOPY N/A 10/28/2024    Procedure: ESOPHAGOGASTRODUODENOSCOPY INSERTION OF LIGHTED INSTRUMENT TO VIEW ESOPHAGUS, STOMACH AND SMALL INTESTINE;  Surgeon: Kingsley Peraza MD;  Location: [facility] ENDOSCOPY;  Service: Gastroenterology;  Laterality: N/A;  Gastritis    HIP BIPOLAR REPLACEMENT Right 01/2000    INSERTION HEMODIALYSIS CATHETER Left 04/09/2024    Procedure: HEMODIALYSIS CATHETER INSERTION;  Surgeon: Jose Berry MD;  Location: [facility] MAIN OR;  Service: General;  Laterality: Left;    INSERTION HEMODIALYSIS CATHETER N/A 04/12/2024    Procedure: Tunneled hemodialysis catheter insertion;  Surgeon: Enrique Vinson MD;  Location: [facility] MAIN OR;  Service: Vascular;  Laterality: N/A;    INSERTION PERITONEAL DIALYSIS CATHETER N/A 03/27/2023    Procedure: LAPAROSCOPIC INSERTION PERITONEAL DIALYSIS CATHETER, LAPAROSCOPIC OMENTOPEXY WITH LYSIS OF ADHESIONS;  Surgeon: Jose Berry MD;  Location: [facility] MAIN OR;  Service: General;  Laterality: N/A;    INSERTION PERITONEAL DIALYSIS CATHETER Left 07/23/2023    Procedure: REVISION OF PERITONEAL DIALYSIS CATHETER;  Surgeon: Radha Oreilly MD;  Location: [facility] MAIN OR;  Service: General;  Laterality: Left;    REMOVAL PERITONEAL DIALYSIS CATHETER N/A 04/09/2024    Procedure: REMOVAL PERITONEAL DIALYSIS CATHETER;  Surgeon: Jose Berry MD;  Location: [facility] MAIN OR;  Service: General;  Laterality: N/A;    RENAL BIOPSY Left 07/15/2022    UPPER GASTROINTESTINAL ENDOSCOPY      WRIST SURGERY      UNSURE  WHICH SIDE DAUGHTER REPORTS HAD SEVERE  CUT FROM WINDOW AND THEY HAD TO DO RECONSTRUCTIVE SURGERY WITH VESSELS AND NERVES       Family History: His family history includes Cancer (age of onset: 35) in his sister; Colon cancer (age of onset: 77) in his sister; Diabetes in his brother, mother, and sister; Heart disease in his father and paternal uncle; Sleep apnea in his daughter and paternal aunt.     Social History: He  reports that he quit smoking about 24 years ago. His smoking use included cigarettes. He started smoking about 34 years ago. He has a 10 pack-year smoking history. He has been exposed to tobacco smoke. He has never used smokeless tobacco. He reports current alcohol use. He reports that he does not use drugs.    Home Medications:  Budeson-Glycopyrrol-Formoterol, Insulin Glargine, Levothyroxine Sodium, Methoxy PEG-Epoetin Beta, Risankizumab-rzaa, allopurinol, amitriptyline, apixaban, atorvastatin, carvedilol, cloNIDine, lidocaine-prilocaine, midodrine, pantoprazole, sertraline, torsemide, and traZODone    Allergies:  He has No Known Allergies.    Objective    Objective     Vitals:    Temp:  [97.3 °F (36.3 °C)-98.9 °F (37.2 °C)] 97.3 °F (36.3 °C)  Heart Rate:  [72-86] 81  Resp:  [16-18] 16  BP: (151-177)/(54-89) 164/57  Flow (L/min) (Oxygen Therapy):  [0.5-1.5] 1    Physical Exam  Constitutional:       Appearance: He is well-developed.   Pulmonary:      Effort: Pulmonary effort is normal. No respiratory distress.   Abdominal:      General: There is no distension.      Palpations: Abdomen is soft.   Neurological:      Mental Status: He is alert and oriented to person, place, and time.     Soft thrill and bruit in left arm brachiocephalic fistula.    Result Review    Result Review:  I have personally reviewed the results from the time of this admission to 11/3/2024 14:50 EST and agree with these findings:  [x]  Laboratory list / accordion  [x]  Microbiology  []  Radiology  []  EKG/Telemetry   []   Cardiology/Vascular   []  Pathology  []  Old records  []  Other:  Most notable findings include: White count 6 hemoglobin 8.4 creatinine 2.39  Multiple positive blood cultures with Staphylococcus epi.      Assessment & Plan   Assessment / Plan     Brief Patient Summary:  Nelson Wang is a 79 y.o. male positive blood cultures with the dialysis catheter indwelling.  I been asked to remove following dialysis today.    Active Hospital Problems:  Active Hospital Problems    Diagnosis     **Discitis of lumbar region     Discitis     Metabolic encephalopathy     Aspiration pneumonia     ESRD on dialysis     Bipolar disorder     Chronic respiratory failure with hypoxia     Essential hypertension     Anemia due to chronic kidney disease     Type 2 diabetes mellitus without complication        Plan:   11/3/2024: Will plan on dialysis cath removal today after dialysis.  Will also check left upper extremity vein mapping to evaluate for maturity of left brachiocephalic fistula.  This was placed by Dr. Mir at [facility] on 7/16/2024.    VTE Prophylaxis:  Pharmacologic & mechanical VTE prophylaxis orders are present.         Marquez Zaman MD

## 2024-11-03 NOTE — PROGRESS NOTES
[facility] Clinical Pharmacy Services: Vancomycin Monitoring Note    Nelson Wang is a 79 y.o. male who is on day 3 of pharmacy to dose vancomycin for bone and/or joint infection.    Previous Vancomycin Dose: Intermittent pulse dosing   Updated Cultures and Sensitivities:     Results from last 7 days   Lab Units 11/03/24  0823 11/01/24  0443   VANCOMYCIN RM mcg/mL 13.20 15.10     Vitals/Labs  Ht:  ; Wt: 74.3 kg (163 lb 12.8 oz)   Temp Readings from Last 1 Encounters:   11/03/24 97.5 °F (36.4 °C) (Oral)     Estimated Creatinine Clearance: 21.8 mL/min (A) (by C-G formula based on SCr of 2.93 mg/dL (H)).   ESRD on home dialysis 5X weekly; IP schedule TBD    Results from last 7 days   Lab Units 11/03/24  0532 11/03/24  0531 11/02/24  0535 11/02/24  0534 11/01/24  0444 11/01/24 0443   CREATININE mg/dL  --  2.93* 3.49*  --   --  6.21*   WBC 10*3/mm3 6.03  --   --  7.09 8.81  --      Assessment/Plan  HD yesterday and today planned, VR within range for redosing.     Current Vancomycin Dose: 750mg dose planned after HD today.   Next Level Date and Time: no levels planned at this time.   We will continue to monitor patient changes and renal function     Thank you for involving pharmacy in this patient's care. Please contact pharmacy with any questions or concerns.       Gabby Hoff, PharmD  Clinical Pharmacist

## 2024-11-03 NOTE — PROGRESS NOTES
Name: Nelson Wang ADMIT: 2024   : 1945  PCP: Domingo Bravo MD    MRN: [id number] LOS: 2 days   AGE/SEX: 79 y.o. male  ROOM: [facility]     Subjective   Subjective   No changes, remains agitated restless, PRM IM Zyprexa administered x 2 . Sitter at bedside.     Review of Systems   As above  Objective   Objective   Vital Signs  Temp:  [97.5 °F (36.4 °C)-98.9 °F (37.2 °C)] 97.5 °F (36.4 °C)  Heart Rate:  [72-86] 75  Resp:  [16-18] 16  BP: (120-177)/(44-89) 177/89  SpO2:  [94 %-100 %] 100 %  on  Flow (L/min) (Oxygen Therapy):  [0.5-2] 1;   Device (Oxygen Therapy): nasal cannula  Body mass index is 26.44 kg/m².  Physical Exam    General: Lying in bed,confused, does open his eyes and look at you when name called, does not follow commands  answer questions,   HEENT: Normocephalic, atraumatic  CV: Regular rate and rhythm, no murmurs r  Lungs: Diminished, no wheezing  Abdomen: Soft, nontender, nondistended  Extremities: No significant peripheral edema , no cyanosis     Results Review     I reviewed the patient's new clinical results.  Results from last 7 days   Lab Units 24  0532 24  0534 244 10/31/24  0802   WBC 10*3/mm3 6.03 7.09 8.81 6.40   HEMOGLOBIN g/dL 8.4* 8.1* 8.2* 8.1*   PLATELETS 10*3/mm3 164 150 163 176     Results from last 7 days   Lab Units 24  0531 24  0535 24  0443 10/31/24  0802   SODIUM mmol/L 138 131* 133* 132*   POTASSIUM mmol/L 3.6 4.2 4.8 4.4   CHLORIDE mmol/L 103 98 98 95*   CO2 mmol/L 22.0 18.0* 22.6 23.6   BUN mg/dL 13 18 32* 21   CREATININE mg/dL 2.93* 3.49* 6.21* 4.24*   GLUCOSE mg/dL 360* 89 108* 117*   Estimated Creatinine Clearance: 21.8 mL/min (A) (by C-G formula based on SCr of 2.93 mg/dL (H)).  Results from last 7 days   Lab Units 24  0535 24  0443 10/31/24  0802 10/30/24  0436 10/29/24  0304 10/28/24  0433   ALBUMIN g/dL 3.0* 2.8* 3.2* 3.3* 2.9* 3.2*   BILIRUBIN mg/dL  --  0.6  --  0.9 0.7 0.9   ALK PHOS U/L  --  64   --  69 65 71   AST (SGOT) U/L  --  9  --  8 8 9   ALT (SGPT) U/L  --  <5  --  <5 <5 5     Results from last 7 days   Lab Units 11/03/24  0531 11/02/24  0535 11/01/24  0443 10/31/24  0802 10/30/24  0436 10/29/24  0304   CALCIUM mg/dL 8.3* 8.7 8.5* 8.9 8.8 8.4*   ALBUMIN g/dL  --  3.0* 2.8* 3.2* 3.3* 2.9*   MAGNESIUM mg/dL 2.2  --  2.1 1.9 1.9 1.9   PHOSPHORUS mg/dL  --  4.2  --  4.0 3.5 3.4     Results from last 7 days   Lab Units 11/01/24  0444 10/31/24  0802 10/30/24  0436   PROCALCITONIN ng/mL  --  0.39* 0.31*   LACTATE mmol/L 0.9  --   --      COVID19   Date Value Ref Range Status   10/08/2024 Not Detected Not Detected - Ref. Range Final   09/28/2024 Not Detected Not Detected - Ref. Range Final     Glucose   Date/Time Value Ref Range Status   11/03/2024 0558 131 (H) 70 - 130 mg/dL Final   11/02/2024 2348 81 70 - 130 mg/dL Final   11/02/2024 1623 83 70 - 130 mg/dL Final   11/02/2024 0633 106 70 - 130 mg/dL Final   11/01/2024 2347 93 70 - 130 mg/dL Final   11/01/2024 1605 92 70 - 130 mg/dL Final   11/01/2024 1152 117 70 - 130 mg/dL Final           MRI Thoracic Spine With & Without Contrast, MRI Lumbar Spine With & Without Contrast, MRI Cervical Spine Without Contrast  Narrative: MRI CERVICAL SPINE WO CONTRAST-, MRI THORACIC SPINE W WO CONTRAST-, MRI  LUMBAR SPINE W WO CONTRAST-     HISTORY:  possible discitis/osteomyilitis     COMPARISON: MRI examination of the thoracic and lumbar spine 9/29/2024  CT chest 10/8/2024 and 10/31/2024     MRI CERVICAL SPINE WITHOUT CONTRAST:     The study is hampered by patient motion. The lack of contrast reduces  the sensitivity of the study.     Signal intensity involving the cervical cord is grossly normal on the  axial T2 sequence. No convincing T2 hyperintensity is appreciated  involving the cervical discs on the motion degraded sagittal T2  sequence. There is moderate loss of disc height at C3-4, C5-6 and C6-7.  Signal loss is appreciated on the sagittal proton density  sequence  extending from the base of the dens to C3-4 suggesting ossification of  posterior longitudinal ligament.     C2-3: Disc osteophyte complex is present which is more prominent on the  left. Abuts the ventral surface of the cord slightly.     C3-4: A small central disc osteophyte complex is present which mildly  abuts the ventral surface of the cord, slight more prominent to the  right. Mild foraminal stenosis is present on the right secondary to  uncovertebral degenerative disease.     C4-5: A small central disc osteophyte complex is present which is  slightly more prominent to the right.     C5-6: A small central disc osteophyte complex is present resulting in  mild flattening of ventral surface of the thecal sac. Slightly more  prominent to the right. Moderate foraminal stenosis is present on the  right secondary to uncovertebral degenerative disease.     C6/C7: A minimal central disc osteophyte complex is present.     C7-T1: There is no evidence of a disc bulge or herniation.     Impression: The study is significantly degraded by patient motion.  Additionally, the lack of contrast reduces the sensitivity of the study.  However, no gross evidence of discitis or osteomyelitis is appreciated.  There is no evidence of gross cord signal abnormality or of cord  compression. Multilevel degenerative disease involving the cervical  spine is noted as described in detail above including what is likely  ossification of posterior longitudinal ligament extending from C2 to  C3/4. See above.        MRI EXAMINATION OF THE THORACIC SPINE WITH AND WITHOUT CONTRAST:     The study is degraded by patient motion.     There is signal loss appreciated involving the T5 and T6 vertebral  bodies on the sagittal T1 sequence. On the sagittal T2 sequence there is  increased signal intensity involving the disc space at T5/T6 and to a  lesser extent the T5 and T6 vertebral bodies. There is adjacent  edema/inflammation also evident  extending from T4-T7. These findings are  new versus 9/29/2024. The CT examination of the chest from 10/31/2024  demonstrates new endplate irregularity as compared to the CT examination  of the chest performed on 10/8/2024. The findings are all consistent  with discitis/osteomyelitis. There is moderate loss of disc height and  partial fusion at T11/T11/T12. A mild compression deformity involving  T12 is noted, unchanged versus 9/29/2024.     There is no evidence of cord signal abnormality on the sagittal or axial  T2 sequences.     After contrast administration there was enhancement of the T5 and T6  vertebral bodies. Epidural enhancement is also appreciated posterior to  the T5 and T6 vertebral bodies. No convincing epidural abscess is  identified. There is enhancing soft tissue appreciated extending into  the neural foramen at T5/T6 bilaterally, more prominent on the right.     IMPRESSION: The study is degraded by patient motion however discitis is  appreciated at T5/T6 with osteomyelitis present involving the T5 and T6  vertebral bodies. Paraspinal edema and enhancement consistent with  infection/inflammatory change is appreciated as described above and  there is also epidural enhancement posterior to the T5 and T6 vertebral  bodies. Enhancement extends into the neural foramen bilaterally at  T5/T6, more prominent on the right. There is no evidence of cord  compression or of gross cord signal abnormality.        MRI EXAMINATION OF THE LUMBAR SPINE WITH AND WITHOUT CONTRAST:     The study is degraded by patient motion.     There is a mild compression deformity involving the T12 and L3 without  evidence of marrow edema. There is mild bony retropulsion of the  superior endplate of T12 and mild to moderate retropulsion involving the  superior aspect of L3, unchanged. The sagittal T2 sequence demonstrates  a mild degree of increased signal intensity involving the disc at L1-L2  which is new versus 9/29/2024. There is  a mild degree of increased  signal intensity involving the disc at L5-S1 also new versus prior  examination. There is partial visualization of a plane of T2  hyperintensity anterior to the sacrum on the sagittal T2 sequence. The  conus is at L1 and the caudal aspect the spinal cord appears  unremarkable.     L1-L2: A mild central broad-based disc osteophyte complex is present  which is more prominent to the left. Mild facet degenerative disease is  present bilaterally.      L2-L3: A mild central broad-based disc osteophyte complex is present  resulting in mild flattening of the ventral surface of the thecal sac.  Mild foraminal stenosis is present bilaterally secondary to extension of  a small disc osteophyte complex into the neural foramen.     L3-L4: A small central disc osteophyte complex is present with zones of  herniation. Mild facet degenerative disease is present bilaterally. Mild  and mild to moderate foraminal stenosis is present on the right and left  respectively secondary to extension of a small disc osteophyte complex  into the neural foramen.     L4-L5: Mild to moderate facet degenerative disease is present  bilaterally. Mild central broad-based disc osteophyte complex is present  which results in mild flattening of the ventral surface of the thecal  sac. Mild to moderate lateral recess narrowing is present bilaterally.  Mild foraminal stenosis is present bilaterally, more prominent on the  right secondary to extension of a small disc osteophyte complex into the  neural foramen.     L5-S1: Moderate facet degenerative disease is present bilaterally. A  broad-based disc osteophyte complex is present which is more prominent  to the right. There is moderate to severe lateral recess narrowing on  the right similar appearances compared to prior examination. Moderate to  severe foraminal stenosis is also present bilaterally secondary to loss  of disc height and extension of the disc osteophyte complex into  the  neural foramen, similar in appearance as compared to the prior  examination. After contrast administration there was enhancement of the  disc at L1-L2. There is mild enhancement involving the disc at L5-S1.     IMPRESSION:  1.  There is new mild T2 hyperintensity involving the disc at the level  of L1-L2 as compared to the MRI examination of the lumbar spine  performed on 9/29/2024. There is also milder T2 hyperintensity and  enhancement at L5-S1 which is also new versus a prior MRI examination.  The findings are suspicious for mild discitis were partially treated  discitis. There is no evidence of an epidural abscess. Clinical  correlation and a follow-up MRI examination of the lumbar spine is  recommended.  2.  Mild compression deformities involving T12 and L3 are appreciated  with bony retropulsion, unchanged. There is no evidence of a new  compression fracture.  3.  Multilevel degenerative disease involving lumbar spine is noted as  described above. The study is hampered by patient motion but there is no  evidence of a focal herniation. This includes moderate to severe lateral  recess narrowing to the right at L5-S1 and moderate to severe foraminal  stenosis bilaterally at L5-S1. See above.  4.  There is a thin plane of edema and enhancement anterior to the  sacrum. This is only partially visualized. Further evaluation could be  performed with a MRI examination of the pelvis with and without  contrast.        This report was finalized on 11/2/2024 2:06 PM by Dr. Donovan Richey M.D  on Workstation: [id number]       Scheduled Medications  amitriptyline, 10 mg, Oral, Nightly  arformoterol, 15 mcg, Nebulization, BID - RT  atorvastatin, 40 mg, Oral, Daily  budesonide, 0.5 mg, Nebulization, BID - RT  carvedilol, 3.125 mg, Oral, QAM  Check Fentanyl Patch Placement, 1 each, Does not apply, Q12H  heparin (porcine), 5,000 Units, Subcutaneous, Q8H  insulin regular, 2-7 Units, Subcutaneous, Q6H  levothyroxine, 175  mcg, Oral, Q AM  pantoprazole, 40 mg, Intravenous, BID AC  risperiDONE, 1 mg, Oral, BID  sertraline, 100 mg, Oral, Nightly  sodium chloride, 10 mL, Intravenous, Q12H  torsemide, 100 mg, Oral, Daily  Vancomycin Pharmacy Intermittent/Pulse Dosing, , Does not apply, Daily    Infusions  Pharmacy to dose vancomycin,     Diet  NPO Diet NPO Type: Tube Feeding    I have personally reviewed     [x]  Laboratory   [x]  Microbiology   [x]  Radiology   [x]  EKG/Telemetry  []  Cardiology/Vascular   []  Pathology    []  Records       Assessment/Plan     Active Hospital Problems    Diagnosis  POA    **Discitis of lumbar region [M46.46]  Yes    Discitis [M46.40]  Yes    Metabolic encephalopathy [G93.41]  Yes    Aspiration pneumonia [J69.0]  Yes    ESRD on dialysis [N18.6, Z99.2]  Not Applicable    Bipolar disorder [F31.9]  Yes    Chronic respiratory failure with hypoxia [J96.11]  Yes    Essential hypertension [I10]  Yes    Anemia due to chronic kidney disease [N18.9, D63.1]  Yes    Type 2 diabetes mellitus without complication [E11.9]  Yes      Resolved Hospital Problems   No resolved problems to display.       Coag negative staph acteremia  T5-T6 discitis/osteomyelitis.   -Blood cultures 10/31/2024 positive for coag negative staph  -IV ceftriaxone discontinued, IV vancomycin continued per ID  -MRI of the cervical spine/thoracic/lumbar spine pending-performed, results as above  -Echocardiogram still pending  -Neurosurgery/ID following     Metabolic encephalopathy  Hallucinations  Bipolar disorder  -CT head 10/8/2024 with no acute findings  -Psychiatry following, adjusting medications  -Sitter at bedside  -Remains confused.    -Patient is on low-dose fentanyl patch which can be contributing, will hold for now as per daughter previously patient became agitated while on fentanyl patch     Hypoxemia  Pneumonia?  -CT scan 10/41/24 mild patchy and consolidative airspace opacities in the right lower lobe with small adjacent pleural  effusion new from previous comparison. Differential includes atelectasis versus early developing infection. -  -On IV antibiotics as above which should cover for pneumonia as well  -Incentive parameter  -Strict n.p.o. currently due to concern for possible aspiration.  -Speech therapy following-core track  ordered 11/2/2024 however family refused, if not cleared for speech therapy will reattempt to  -core track            ESRD on HD  -Electrolytes acutely stable.    -Nephrology managing dialysis        Gastritis  Abdominal pain  -EGD 10/28/2024 showed gastritis  -IV PPI twice daily     Hypothyroidism  -Continue levothyroxine once able to take p.o.  -TSH 10/28/2024 was normal        Diabetes mellitus  -SSI.  Hypoglycemic protocol        Anemia of chronic disease  -Hemoglobin stable around 8.  -Monitor and transfuse as indicated for symptomatic anemia or hemoglobin less than 7      DVT prophylaxis.  DVT prophylaxis  Full code.  Palliative care consulted.  Expected Discharge Date: 11/4/2024; Expected Discharge Time:        Copied text in this note has been reviewed and is accurate as of 11/03/24.         Dictated utilizing Dragon dictation        Fela Snyder MD  [facility]  11/03/24  08:56 EST

## 2024-11-03 NOTE — PROGRESS NOTES
NEUROSURGERY PROGRESS NOTE     LOS: 2 days   Patient Care Team:  Domingo Bravo MD as PCP - General (Internal Medicine)  Josephine Warren APRN as Nurse Practitioner (Pulmonary Disease)  Virginie Lopez APRN as Nurse Practitioner (Pulmonary Disease)    Chief Complaint:  Concerns for osteomyelitis/discitis       Subjective     Interval History: NAEO. MRIs completed    While in the room and during my examination of the patient I wore a mask and eye protection.  I washed my hands before and after this patient encounter.  The patient was also wearing a mask.     History taken from: patient chart RN    Objective      Vital Signs  Temp:  [97.5 °F (36.4 °C)-98.9 °F (37.2 °C)] 97.5 °F (36.4 °C)  Heart Rate:  [72-86] 75  Resp:  [16-18] 16  BP: (120-177)/(44-89) 177/89  Body mass index is 26.44 kg/m².    Intake/Output last 3 shifts:  I/O last 3 completed shifts:  In: 0   Out: 1000     Intake/Output this shift:  No intake/output data recorded.    Physical    GENERAL: No acute distress. Sedated, responds to voice but does not follow commands. Moves all extremities spontaneously. Recently got dose of zyprexa    Results Review:  I reviewed the patient's new clinical results.  I reviewed the patient's new imaging results and agree with the interpretation.    Labs:    Lab Results (last 24 hours)       Procedure Component Value Units Date/Time    POC Glucose Once [[id number]]  (Normal) Collected: 11/02/24 1623    Specimen: Blood Updated: 11/02/24 1624     Glucose 83 mg/dL     Blood Culture - Blood, Arm, Right [[id number]] Collected: 11/02/24 1856    Specimen: Blood from Arm, Right Updated: 11/02/24 1907    Blood Culture - Blood, Hand, Right [[id number]] Collected: 11/02/24 2032    Specimen: Blood from Hand, Right Updated: 11/02/24 2117    POC Glucose Once [[id number]]  (Normal) Collected: 11/02/24 2348    Specimen: Blood Updated: 11/02/24 2349     Glucose 81 mg/dL     Basic Metabolic Panel [[id number]]  (Abnormal) Collected: 11/03/24  0531    Specimen: Blood Updated: 11/03/24 0643     Glucose 360 mg/dL      BUN 13 mg/dL      Creatinine 2.93 mg/dL      Sodium 138 mmol/L      Potassium 3.6 mmol/L      Chloride 103 mmol/L      CO2 22.0 mmol/L      Calcium 8.3 mg/dL      BUN/Creatinine Ratio 4.4     Anion Gap 13.0 mmol/L      eGFR 21.2 mL/min/1.73     Narrative:      GFR Normal >60  Chronic Kidney Disease <60  Kidney Failure <15    The GFR formula is only valid for adults with stable renal function between ages 18 and 70.    Magnesium [[id number]]  (Normal) Collected: 11/03/24 0531    Specimen: Blood Updated: 11/03/24 0643     Magnesium 2.2 mg/dL     CBC & Differential [[id number]]  (Abnormal) Collected: 11/03/24 0532    Specimen: Blood Updated: 11/03/24 0618    Narrative:      The following orders were created for panel order CBC & Differential.  Procedure                               Abnormality         Status                     ---------                               -----------         ------                     CBC Auto Differential[[id number]]        Abnormal            Final result                 Please view results for these tests on the individual orders.    CBC Auto Differential [[id number]]  (Abnormal) Collected: 11/03/24 0532    Specimen: Blood Updated: 11/03/24 0618     WBC 6.03 10*3/mm3      RBC 2.99 10*6/mm3      Hemoglobin 8.4 g/dL      Hematocrit 27.6 %      MCV 92.3 fL      MCH 28.1 pg      MCHC 30.4 g/dL      RDW 13.9 %      RDW-SD 46.8 fl      MPV 9.8 fL      Platelets 164 10*3/mm3      Neutrophil % 60.4 %      Lymphocyte % 20.9 %      Monocyte % 12.9 %      Eosinophil % 5.3 %      Basophil % 0.2 %      Immature Grans % 0.3 %      Neutrophils, Absolute 3.64 10*3/mm3      Lymphocytes, Absolute 1.26 10*3/mm3      Monocytes, Absolute 0.78 10*3/mm3      Eosinophils, Absolute 0.32 10*3/mm3      Basophils, Absolute 0.01 10*3/mm3      Immature Grans, Absolute 0.02 10*3/mm3      nRBC 0.0 /100 WBC     POC Glucose Once [[id number]]   (Abnormal) Collected: 11/03/24 0558    Specimen: Blood Updated: 11/03/24 0601     Glucose 131 mg/dL     Vancomycin, Random [[id number]] Collected: 11/03/24 0823    Specimen: Blood Updated: 11/03/24 0844            Imaging:    MR: MRI of the cervical spine wo contrast was reviewed and shows no concern for osteomyelitis or epidural abscess  MRI of the thoracic spine wo contrast was reviewed and shows T5/6 osteomyelitis, discitis without epidural abscess or spinal cord compression or cord signal change.  MRI of the lumbar spine wo contrast was reviewed and shows T12 and L3 compression fractures, possible new early L5/S1 discitis    Current Medications:   Scheduled Meds:amitriptyline, 10 mg, Oral, Nightly  arformoterol, 15 mcg, Nebulization, BID - RT  atorvastatin, 40 mg, Oral, Daily  budesonide, 0.5 mg, Nebulization, BID - RT  carvedilol, 3.125 mg, Oral, QAM  Check Fentanyl Patch Placement, 1 each, Does not apply, Q12H  heparin (porcine), 5,000 Units, Subcutaneous, Q8H  insulin regular, 2-7 Units, Subcutaneous, Q6H  levothyroxine, 175 mcg, Oral, Q AM  pantoprazole, 40 mg, Intravenous, BID AC  risperiDONE, 1 mg, Oral, BID  sertraline, 100 mg, Oral, Nightly  sodium chloride, 10 mL, Intravenous, Q12H  torsemide, 100 mg, Oral, Daily  Vancomycin Pharmacy Intermittent/Pulse Dosing, , Does not apply, Daily      Continuous Infusions:Pharmacy to dose vancomycin,         Assessment & Plan       Discitis of lumbar region    Essential hypertension    Anemia due to chronic kidney disease    Type 2 diabetes mellitus without complication    Bipolar disorder    Chronic respiratory failure with hypoxia    ESRD on dialysis    Discitis    Metabolic encephalopathy    Aspiration pneumonia      Assessment/Plan:  Nelson Wang is a 79 y.o. male with T5/6 osteomyelitis and discitis without neurologic compromised    Continue with antibiotics per ID. Coag neg Staph in blood cultures, repeat blood cultures pending. On Vanc   No surgical  "intervention indicated at this time. Will f/u with MRI T and L spine W WO in 3 months.       Candace Das MD  11/03/24  09:07 EST    \"Dictated utilizing Dragon dictation\".      "

## 2024-11-03 NOTE — PLAN OF CARE
Goal Outcome Evaluation:  Plan of Care Reviewed With: patient        Progress: no change  Outcome Evaluation: VSS (see chart), 1.5L NC, periods of confusion and agitation, sitter at bedside, prn pain medications manage symptoms, dextrose 5% with .45 NS 50ml/hr, requested speech to conduct swallow study, continue plan of care

## 2024-11-03 NOTE — PROGRESS NOTES
"    [facility] Progress Note      Patient Name: Nelson Wang  : 1945  MRN: [id number]  Primary Care Physician:  Domingo Bravo MD  Date of admission: 2024    Subjective     Interval History:   F/u ESRD     Review of Systems:   Dialyzed today w/o incident 1.5L removed.  TDC removed afterward  He's very drowsy currently  Daughter asks about giving gabapentin for \"nerve pain\" in back    Objective     Vitals:   Temp:  [97.3 °F (36.3 °C)-98.9 °F (37.2 °C)] 97.3 °F (36.3 °C)  Heart Rate:  [72-86] 81  Resp:  [16-18] 16  BP: (151-177)/(54-89) 164/57  Flow (L/min) (Oxygen Therapy):  [0.5-1.5] 1    Intake/Output Summary (Last 24 hours) at 11/3/2024 1528  Last data filed at 11/3/2024 1300  Gross per 24 hour   Intake 0 ml   Output 2500 ml   Net -2500 ml       Physical Exam:    General Appearance: frail drowsy M no distress resting   Neck: RIJ TDC out, no JVD  Lungs: CTA bilat no rales  Heart: RRR, normal S1 and S2  Abdomen: soft, nontender, nondistended  Extremities: no edema, LUE AVF +T/B    Scheduled Meds:     amitriptyline, 10 mg, Oral, Nightly  arformoterol, 15 mcg, Nebulization, BID - RT  atorvastatin, 40 mg, Oral, Daily  budesonide, 0.5 mg, Nebulization, BID - RT  carvedilol, 3.125 mg, Oral, QAM  Check Fentanyl Patch Placement, 1 each, Does not apply, Q12H  heparin (porcine), 5,000 Units, Subcutaneous, Q8H  insulin regular, 2-7 Units, Subcutaneous, Q6H  levothyroxine, 175 mcg, Oral, Q AM  pantoprazole, 40 mg, Intravenous, BID AC  risperiDONE, 2 mg, Oral, Daily With Breakfast & Dinner  risperiDONE, 2 mg, Oral, Nightly  sertraline, 100 mg, Oral, Nightly  sodium chloride, 10 mL, Intravenous, Q12H  torsemide, 100 mg, Oral, Daily  vancomycin, 750 mg, Intravenous, Once  Vancomycin Pharmacy Intermittent/Pulse Dosing, , Does not apply, Daily      IV Meds:   Pharmacy to dose vancomycin,         Results Reviewed:   I have personally reviewed the results from the time of this admission to " 11/3/2024 15:28 EST     Results from last 7 days   Lab Units 11/03/24  0531 11/02/24  0535 11/01/24  0443 10/31/24  0802 10/30/24  0436 10/29/24  0304   SODIUM mmol/L 138 131* 133*   < > 130* 130*   POTASSIUM mmol/L 3.6 4.2 4.8   < > 4.3 4.1   CHLORIDE mmol/L 103 98 98   < > 92* 94*   CO2 mmol/L 22.0 18.0* 22.6   < > 26.6 27.3   BUN mg/dL 13 18 32*   < > 34* 25*   CREATININE mg/dL 2.93* 3.49* 6.21*   < > 5.82* 4.40*   CALCIUM mg/dL 8.3* 8.7 8.5*   < > 8.8 8.4*   BILIRUBIN mg/dL  --   --  0.6  --  0.9 0.7   ALK PHOS U/L  --   --  64  --  69 65   ALT (SGPT) U/L  --   --  <5  --  <5 <5   AST (SGOT) U/L  --   --  9  --  8 8   GLUCOSE mg/dL 360* 89 108*   < > 159* 209*    < > = values in this interval not displayed.     Estimated Creatinine Clearance: 21.8 mL/min (A) (by C-G formula based on SCr of 2.93 mg/dL (H)).  Results from last 7 days   Lab Units 11/03/24  0531 11/02/24  0535 11/01/24  0443 10/31/24  0802 10/30/24  0436   MAGNESIUM mg/dL 2.2  --  2.1 1.9 1.9   PHOSPHORUS mg/dL  --  4.2  --  4.0 3.5         Results from last 7 days   Lab Units 11/03/24  0532 11/02/24  0534 11/01/24  0444 10/31/24  0802 10/30/24  0436   WBC 10*3/mm3 6.03 7.09 8.81 6.40 6.52   HEMOGLOBIN g/dL 8.4* 8.1* 8.2* 8.1* 8.0*   PLATELETS 10*3/mm3 164 150 163 176 191           Assessment / Plan     ASSESSMENT:  End-stage renal disease - been on home hemodialysis receiving 5 times a week (Dr Castro follows), livia VILLEDA TDC due to LUE AVF dysfunction (placed in July).  Dialyzed SAT & again today (SUN) due to gadolinium given yesterday AM.  TDC removed after treatment today due to bacteremia.  Volume/lytes stable  Anemia of chronic kidney disease on long-acting RONALD his hemoglobin today is 8.4  Hypertension with chronic kidney disease uncontrolled ; coreg 3.125 only once daily (HR 70s to 80s)  Hypothyroidism on replacement therapy  Acute encephalopathy   T5/6 discitis/osteomyelitis - ID & NSEG following.  MRI with contrast 11/2 shows L1/2 & T5/6  discitis  Cog neg staph bacteremia - ID following, on vanc in place of rocephin     PLAN:  HD today completed followed by TDC removal  Appreciate vascular input, to assess maturity of LUE AVF  Plan next HD TUES, hopefully via LUE AVF   Echo pending  Inc coreg BID  Daughter asks about gabapentin, defer to [facility] but would have to be low dose ie max 300 mg/24h given ESRD  D/W RN        Rigo Oliva MD  11/03/24  15:28 [facility]    [facility]  [phone]

## 2024-11-03 NOTE — PROGRESS NOTES
Discussed with Dr Bunch.  Has coag neg staph bacteremia concerning for line sepsis.  Will ask vascular surgery to remove tunnel catheter after dialysis today (dialyzing again today due to gadolinium).

## 2024-11-03 NOTE — PROGRESS NOTES
ID note for discitis    Subjective: He had just received some sedation per staff.  Previously agitated.  History therefore unobtainable.    Objective:   Temp:  [97.5 °F (36.4 °C)-98.9 °F (37.2 °C)] 97.5 °F (36.4 °C)  Heart Rate:  [72-86] 75  Resp:  [16-18] 16  BP: (120-177)/(44-89) 177/89  Chronically ill-appearing, sedated, normal respiratory effort      White blood cell 6.03  Creatinine 2.9  Glc     Microbiology:  10/31 MRSA nasal PCR negative  10/31 BCx Cons x 2     Assessment plan  Coag negative staph bacteremia   T5/6 discitis/osteomyelitis  End-stage renal disease on hemodialysis complicating above  Diabetes mellitus type II, continue glycemic control efforts to prevent/control infectious complications    Cultures from blood now with coag negative staph/Staphylococcus epidermidis  Repeat blood cultures sent and are pending.  MRI limited studies but thought to have L1/2 discitis along with T5/6 discitis.  Has tunneled dialysis catheter in place which could be source of infection versus cutaneous.  Probably reasonable to pursue a line holiday.  Discussed with Dr. Oliva who is in agreement and will consult vascular surgery for removal.  Appreciate his help.  Await echo.  Continue vancomycin, spot dosing given ESRD.

## 2024-11-03 NOTE — OP NOTE
Date of Admission:  11/1/2024  Today's Date:  11/03/24  Marquez Zaman MD  [facility]    Preoperative Diagnosis:   Infected right jugular tunneled catheter.    Postoperative Diagnosis:   Same    Procedure Performed:   Bedside removal of tunneled hemodialysis catheter.    Surgeon:   Marquez Zaman MD    Assistant:    None    Anesthesia:   Local    Estimated Blood Loss:   Minimal    Findings:    Successful removal of entire right jugular tunneled catheter.    Patient was agitated throughout the procedure.  Requiring assistance from multiple other nurses and technicians.  Interestingly the portions of the procedures that the patient most commonly exhibits discomfort to the patient was the most relaxed.    Specimen:   none    Complications:   none    Indication for procedure:   79 y.o. male with infected right jugular tunneled catheter need for removal.    Description of procedure:   In the patient's hospital room the tunneled catheter was prepped and draped in the usual sterile manner.  A full surgical timeout was performed.  Local anesthetic was infiltrated into the catheter site.  Sutures were transected.  Utilizing a combination of blunt and sharp dissection the subcutaneous cuff was freed from its attachments.  Catheter was slightly retracted.  A horizontal mattress stitch was placed at the exit site and catheter was removed and its suture was secured.  Direct pressure was applied until good hemostasis was identified.  Sterile dressings were applied.  Overall patient tolerated the procedure well.    Marquez Zaman MD  11/03/24     Active Hospital Problems    Diagnosis  POA    **Discitis of lumbar region [M46.46]  Yes    Discitis [M46.40]  Yes    Metabolic encephalopathy [G93.41]  Yes    Aspiration pneumonia [J69.0]  Yes    ESRD on dialysis [N18.6, Z99.2]  Not Applicable    Bipolar disorder [F31.9]  Yes    Chronic respiratory failure with hypoxia [J96.11]  Yes    Essential hypertension [I10]  Yes     Anemia due to chronic kidney disease [N18.9, D63.1]  Yes    Type 2 diabetes mellitus without complication [E11.9]  Yes      Resolved Hospital Problems   No resolved problems to display.

## 2024-11-03 NOTE — CONSULTS
Nutrition Services    Patient Name:  Nelson Wang  YOB: 1945  MRN: [id number]  Admit Date:  11/1/2024  Assessment Date:  11/03/24    Comment: Nutrition Consult  This is a 80 yo male transferred from [facility] for lumbar discitis. Pt with AMS and very confused. He has a hx of HTN, HLD, chronic thrombocytopenia, DM, ESRD and on Hemodialysis. Note that pt also with some recent c/o abd pain and EGD was done on 10/28 showing gastritis.   Speech has attempted to evaluate pt but unable to arouse enough to assess for po intake.   Labs: 131-360, cr 2.93  Diet: NPO  Spoke with RN who reports family is asking us to hold on placing a cortrak and would like speech to reevaluate.     Plan/Recommendation  PO diet pending speech evaluation when pt more alert  If TF's begin, recommend Novasource Renal at 20ml/hr and advance to goal of 45ml/hr, Water flushes [id number],   Calories  1980 kcals (100%)    Protein  90 g (100%)    Free water  703 mL   Flushes  180 ml   The above end goal rate is for 22 hrs/day to assume interruptions for ADLs. Water flushes adjusted based on clinical picture + Rx flushes/IV fluids     RD to follow    CLINICAL NUTRITION ASSESSMENT      Reason for Assessment Cortrak Placement, TF Assessment    Diagnosis/Problem  lumbar discitis, AMS, ESRD on hemodialysis   Medical & Surgical Hx Past Medical History:   Diagnosis Date    Abnormal stress test     Anemia     IRON INFUSIONS WITH DIALYSIS    Anesthesia     WITH HIP REPLACEMENT DAUGHTER BELIEVES HAD SVT 2000    Ankle pain, right     Anxiety and depression     CKD (chronic kidney disease), stage IV     DIALYSIS [id number] SHILEY IN RIGHT CHEST    Diabetes     Gout     High cholesterol     History of peritoneal dialysis     HL (hearing loss)     Hyperkalemia     Hypertension     Hypothyroidism     Insomnia     Night terrors     Psoriasis     Psoriasis     Sleep walking     Thrombocytopenia     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     Vitiligo        Past Surgical History:   Procedure Laterality Date    ANKLE SURGERY Right 11/1990    APPENDECTOMY N/A 1954    ARTERIOVENOUS FISTULA/SHUNT SURGERY Left 07/16/2024    Procedure: Creation of left arm arteriovenous fistula;  Surgeon: Moses Mir MD;  Location: [facility] MAIN OR;  Service: Vascular;  Laterality: Left;    BRONCHOSCOPY N/A 03/01/2024    Procedure: BRONCHOSCOPY WITH BAL AND WASHINGS;  Surgeon: Sandra Espinal MD;  Location: [facility] ENDOSCOPY;  Service: Pulmonary;  Laterality: N/A;  PNEUMONIA    BRONCHOSCOPY N/A 08/30/2024    Procedure: BRONCHOSCOPY WITH BALS AND WASHINGS;  Surgeon: Sandra Espinal MD;  Location: [facility] ENDOSCOPY;  Service: Pulmonary;  Laterality: N/A;  MUCOUS PLUGGING    CARDIAC CATHETERIZATION N/A 05/29/2024    Procedure: Left Heart Cath with possible coronary angioplasty;  Surgeon: Stephan Nichols MD;  Location: [facility] CATH INVASIVE LOCATION;  Service: Cardiology;  Laterality: N/A;    COLONOSCOPY N/A 06/2022    [facility]    ENDOSCOPY N/A 10/28/2024    Procedure: ESOPHAGOGASTRODUODENOSCOPY INSERTION OF LIGHTED INSTRUMENT TO VIEW ESOPHAGUS, STOMACH AND SMALL INTESTINE;  Surgeon: Kingsley Peraza MD;  Location: [facility] ENDOSCOPY;  Service: Gastroenterology;  Laterality: N/A;  Gastritis    HIP BIPOLAR REPLACEMENT Right 01/2000    INSERTION HEMODIALYSIS CATHETER Left 04/09/2024    Procedure: HEMODIALYSIS CATHETER INSERTION;  Surgeon: Jose Berry MD;  Location: [facility] OR;  Service: General;  Laterality: Left;    INSERTION HEMODIALYSIS CATHETER N/A 04/12/2024    Procedure: Tunneled hemodialysis catheter insertion;  Surgeon: Enrique Vinson MD;  Location: [facility] OR;  Service: Vascular;  Laterality: N/A;    INSERTION PERITONEAL DIALYSIS CATHETER N/A 03/27/2023    Procedure: LAPAROSCOPIC INSERTION PERITONEAL DIALYSIS CATHETER, LAPAROSCOPIC OMENTOPEXY WITH LYSIS OF ADHESIONS;  Surgeon: Jose Berry MD;  Location: [facility] OR;   Service: General;  Laterality: N/A;    INSERTION PERITONEAL DIALYSIS CATHETER Left 07/23/2023    Procedure: REVISION OF PERITONEAL DIALYSIS CATHETER;  Surgeon: Radha Oreilly MD;  Location: [facility] MAIN OR;  Service: General;  Laterality: Left;    REMOVAL PERITONEAL DIALYSIS CATHETER N/A 04/09/2024    Procedure: REMOVAL PERITONEAL DIALYSIS CATHETER;  Surgeon: Jose Berry MD;  Location: [facility] MAIN OR;  Service: General;  Laterality: N/A;    RENAL BIOPSY Left 07/15/2022    UPPER GASTROINTESTINAL ENDOSCOPY      WRIST SURGERY      UNSURE WHICH SIDE DAUGHTER REPORTS HAD SEVERE  CUT FROM WINDOW AND THEY HAD TO DO RECONSTRUCTIVE SURGERY WITH VESSELS AND NERVES        Current Problems Speech unable to evaluate due to AMS     Encounter Information        Nutrition History    Food Preferences    Supplements    Factors Affecting Intake abdominal pain, altered mental status   Tests/Procedures No new tests/procedures     Anthropometrics        Current Height   Current Weight  BMI kg/m2    Weight: 74.3 kg (163 lb 12.8 oz) (11/01/24 0159)  Body mass index is 26.44 kg/m².     Adj BMI (if applicable)    BMI Category Overweight (25 - 29.9)       Admission Weight    Ideal Body Weight (IBW) 140lb     Adj IBW (if applicable)    Usual Body Weight (UBW) 160-170lb   Weight Change/Trend Loss, Gain       Weight history: Wt Readings from Last 30 Encounters:   11/01/24 0159 74.3 kg (163 lb 12.8 oz)   10/27/24 0700 72.5 kg (159 lb 13.3 oz)   10/27/24 0346 75.7 kg (166 lb 14.2 oz)   10/09/24 1402 75.7 kg (166 lb 12.8 oz)   10/08/24 1455 75.3 kg (166 lb 0.1 oz)   10/02/24 0812 77.5 kg (170 lb 13.7 oz)   09/28/24 0330 77.2 kg (170 lb 3.1 oz)   09/27/24 1659 76.2 kg (167 lb 15.9 oz)   09/26/24 0823 78.9 kg (174 lb)   09/16/24 1814 79.3 kg (174 lb 13.2 oz)   09/12/24 1344 77.1 kg (169 lb 15.6 oz)   08/30/24 0927 75.6 kg (166 lb 10.7 oz)   08/14/24 0922 76.7 kg (169 lb)   07/17/24 1116 77.2 kg (170 lb 3.2 oz)   07/16/24 0817 73.8 kg  (162 lb 11.2 oz)   07/10/24 0740 73.5 kg (162 lb)   06/17/24 1604 74.6 kg (164 lb 6.4 oz)   06/10/24 1348 80 kg (176 lb 5.9 oz)   06/06/24 1423 72 kg (158 lb 11.2 oz)   05/29/24 0720 72.9 kg (160 lb 11.5 oz)   05/28/24 1015 74.5 kg (164 lb 3.9 oz)   05/24/24 0917 74.6 kg (164 lb 6.4 oz)   05/03/24 1100 73 kg (160 lb 14.4 oz)   05/03/24 0838 74.6 kg (164 lb 6.4 oz)   05/01/24 0847 73.5 kg (162 lb)   04/24/24 1105 73.8 kg (162 lb 9.6 oz)   04/19/24 1300 74.5 kg (164 lb 3.2 oz)   04/14/24 1735 71.7 kg (158 lb 1.1 oz)   04/08/24 2128 78 kg (171 lb 15.3 oz)   03/24/24 0526 78 kg (171 lb 15.3 oz)   03/22/24 0846 75.9 kg (167 lb 6 oz)   03/19/24 1023 75.5 kg (166 lb 6.4 oz)   03/12/24 0900 75.2 kg (165 lb 12.8 oz)   03/08/24 0940 70 kg (154 lb 5.2 oz)   03/07/24 1208 73.8 kg (162 lb 11.2 oz)   03/06/24 1100 73.9 kg (163 lb)   03/05/24 0600 78 kg (171 lb 15.3 oz)   03/04/24 0601 74.6 kg (164 lb 7.4 oz)   03/02/24 0300 83.4 kg (183 lb 13.8 oz)   03/01/24 0600 80.9 kg (178 lb 5.6 oz)   02/29/24 0919 80 kg (176 lb 5.9 oz)   02/29/24 0600 75 kg (165 lb 5.5 oz)   02/28/24 2143 81.8 kg (180 lb 5.4 oz)        Estimated/Assessed Needs        Energy Requirements    Weight for Calculation 74.3   Method for Estimation  25-30 kcal/kg   EST Needs (kcal/day) 1784-5303       Protein Requirements    Weight for Calculation 74.3   EST Protein Needs (g/kg) 1.0 gm/kg, 1.5 gm/kg   EST Daily Needs (g/day)        Fluid Requirements     Method for Estimation 1 mL/kcal    Estimated Needs (mL/day)      Labs        Pertinent Labs    Results from last 7 days   Lab Units 11/03/24  0531 11/02/24  0535 11/01/24  0443 10/31/24  0802 10/30/24  0436 10/29/24  0304   SODIUM mmol/L 138 131* 133*   < > 130* 130*   POTASSIUM mmol/L 3.6 4.2 4.8   < > 4.3 4.1   CHLORIDE mmol/L 103 98 98   < > 92* 94*   CO2 mmol/L 22.0 18.0* 22.6   < > 26.6 27.3   BUN mg/dL 13 18 32*   < > 34* 25*   CREATININE mg/dL 2.93* 3.49* 6.21*   < > 5.82* 4.40*   CALCIUM mg/dL 8.3*  8.7 8.5*   < > 8.8 8.4*   BILIRUBIN mg/dL  --   --  0.6  --  0.9 0.7   ALK PHOS U/L  --   --  64  --  69 65   ALT (SGPT) U/L  --   --  <5  --  <5 <5   AST (SGOT) U/L  --   --  9  --  8 8   GLUCOSE mg/dL 360* 89 108*   < > 159* 209*    < > = values in this interval not displayed.     Results from last 7 days   Lab Units 11/03/24  0532 11/03/24  0531 11/02/24  0535 11/01/24 0444 11/01/24 0443 10/31/24  0802   MAGNESIUM mg/dL  --  2.2  --   --  2.1 1.9   PHOSPHORUS mg/dL  --   --  4.2  --   --  4.0   HEMOGLOBIN g/dL 8.4*  --   --    < >  --  8.1*   HEMATOCRIT % 27.6*  --   --    < >  --  26.8*   WBC 10*3/mm3 6.03  --   --    < >  --  6.40   ALBUMIN g/dL  --   --  3.0*  --  2.8* 3.2*    < > = values in this interval not displayed.     Results from last 7 days   Lab Units 11/03/24  0532 11/02/24  0534 11/01/24 0444 10/31/24  0802 10/30/24  0436   PLATELETS 10*3/mm3 164 150 163 176 191     COVID19   Date Value Ref Range Status   10/08/2024 Not Detected Not Detected - Ref. Range Final     Lab Results   Component Value Date    HGBA1C 6.90 (H) 10/08/2024          Medications            Scheduled Medications amitriptyline, 10 mg, Oral, Nightly  arformoterol, 15 mcg, Nebulization, BID - RT  atorvastatin, 40 mg, Oral, Daily  budesonide, 0.5 mg, Nebulization, BID - RT  carvedilol, 3.125 mg, Oral, QAM  Check Fentanyl Patch Placement, 1 each, Does not apply, Q12H  heparin (porcine), 5,000 Units, Subcutaneous, Q8H  insulin regular, 2-7 Units, Subcutaneous, Q6H  levothyroxine, 175 mcg, Oral, Q AM  pantoprazole, 40 mg, Intravenous, BID AC  risperiDONE, 1 mg, Oral, BID  sertraline, 100 mg, Oral, Nightly  sodium chloride, 10 mL, Intravenous, Q12H  torsemide, 100 mg, Oral, Daily  Vancomycin Pharmacy Intermittent/Pulse Dosing, , Does not apply, Daily        Infusions dextrose 5 % and sodium chloride 0.9 %, 50 mL/hr, Last Rate: 50 mL/hr (11/03/24 0027)  Pharmacy to dose vancomycin,         PRN Medications   acetaminophen **OR**  acetaminophen **OR** acetaminophen    senna-docusate sodium **AND** polyethylene glycol **AND** bisacodyl **AND** bisacodyl    cloNIDine    dextrose    dextrose    famotidine    glucagon (human recombinant)    heparin (porcine)    HYDROmorphone    ipratropium-albuterol    labetalol    melatonin    nitroglycerin    OLANZapine    ondansetron    Pharmacy to dose vancomycin    sodium chloride    sodium chloride     Physical Findings          Physical Appearance agitated, disoriented, flat affect, on oxygen therapy impulsive   Oral/Mouth Cavity tooth or teeth missing   Edema  no edema   Gastrointestinal fecal incontinence, last bowel movement: 10/29   Skin  bruising   Tubes/Drains/Lines dialysis catheter   NFPE No clinical signs of muscle wasting or fat loss   --  Current Nutrition Orders & Evaluation of Intake       Oral Nutrition     Food Allergies NKFA   Current PO Diet NPO Diet NPO Type: Tube Feeding   Supplement    PO Evaluation     Trending % PO Intake    --  Nutrition Diagnosis        Nutrition Dx Problem 1 Problem: Inadequate Oral Intake  Etiology: Factors Affecting Nutrition - AMS  Signs/Symptoms: SLP/Swallow EvaluationNPO    Comment:      INTERVENTION / PLAN OF CARE  Intervention Goal        Intervention Goal(s) Reduce/improve symptoms, Meet estimated needs, Disease management/therapy, Initiate feeding/diet, Initiate TF/PN, and No significant weight loss     Nutrition Intervention        RD Action Await initiation/advancement of PO diet, Await initiation of EN/PN, Continue to monitor, Care plan reviewed, and Recommend/order: TF's     Prescription         Diet  Per speech evaluation when pt alert   Supplement/Snack    EN/PN     Prescription Ordered       Enteral Prescription:     Enteral Route NG    TF Delivery Method Continuous    Enteral Product Novasource Renal    Modular None    Propofol Rate/Kcal     TF Start Rate 20    TF Goal Rate 45    Free Water Flush [id number]    TF Provision at Goal: 1980 kcal, 90 gm  protein, 713 mL free water + 180 mL water flushes         Calories 100 % needs met         Protein  100 % needs met         Fluid (mL)     Prescription Ordered No, recommended     Monitor/Evaluation        Monitor Per protocol, I&O, PO intake, Pertinent labs, EN delivery/tolerance, Weight, GI status, Symptoms, Swallow function   Discharge Plan Pending clinical course   Education Education not appropriate at this time     RD to follow per protocol.       Electronically signed by:  Elisabeth Liao RD  11/03/24 06:51 EST

## 2024-11-04 ENCOUNTER — APPOINTMENT (OUTPATIENT)
Dept: CARDIOLOGY | Facility: HOSPITAL | Age: 79
DRG: 551 | End: 2024-11-04
Payer: MEDICARE

## 2024-11-04 ENCOUNTER — TELEPHONE (OUTPATIENT)
Dept: NEUROSURGERY | Facility: CLINIC | Age: 79
End: 2024-11-04
Payer: MEDICARE

## 2024-11-04 DIAGNOSIS — M46.46 DISCITIS OF LUMBAR REGION: Primary | ICD-10-CM

## 2024-11-04 PROBLEM — E43 SEVERE MALNUTRITION: Status: ACTIVE | Noted: 2024-11-04

## 2024-11-04 LAB
ANION GAP SERPL CALCULATED.3IONS-SCNC: 12.6 MMOL/L (ref 5–15)
AORTIC DIMENSIONLESS INDEX: 0.46 (DI)
BH CV ECHO MEAS - AO MAX PG: 18.3 MMHG
BH CV ECHO MEAS - AO MEAN PG: 10 MMHG
BH CV ECHO MEAS - AO ROOT DIAM: 2.9 CM
BH CV ECHO MEAS - AO V2 MAX: 214 CM/SEC
BH CV ECHO MEAS - AO V2 VTI: 40.7 CM
BH CV ECHO MEAS - AVA(I,D): 1.96 CM2
BH CV ECHO MEAS - EDV(CUBED): 132.7 ML
BH CV ECHO MEAS - EDV(MOD-SP4): 103 ML
BH CV ECHO MEAS - EF(MOD-SP4): 60.2 %
BH CV ECHO MEAS - ESV(CUBED): 41.3 ML
BH CV ECHO MEAS - ESV(MOD-SP4): 41 ML
BH CV ECHO MEAS - FS: 32.2 %
BH CV ECHO MEAS - IVS/LVPW: 1 CM
BH CV ECHO MEAS - IVSD: 0.8 CM
BH CV ECHO MEAS - LV DIASTOLIC VOL/BSA (35-75): 58.7 CM2
BH CV ECHO MEAS - LV MASS(C)D: 140.5 GRAMS
BH CV ECHO MEAS - LV MAX PG: 5.5 MMHG
BH CV ECHO MEAS - LV MEAN PG: 3 MMHG
BH CV ECHO MEAS - LV SYSTOLIC VOL/BSA (12-30): 23.4 CM2
BH CV ECHO MEAS - LV V1 MAX: 117 CM/SEC
BH CV ECHO MEAS - LV V1 VTI: 21.7 CM
BH CV ECHO MEAS - LVIDD: 5.1 CM
BH CV ECHO MEAS - LVIDS: 3.5 CM
BH CV ECHO MEAS - LVOT AREA: 3.7 CM2
BH CV ECHO MEAS - LVOT DIAM: 2.17 CM
BH CV ECHO MEAS - LVPWD: 0.8 CM
BH CV ECHO MEAS - MV A MAX VEL: 106.4 CM/SEC
BH CV ECHO MEAS - MV DEC SLOPE: 552.9 CM/SEC2
BH CV ECHO MEAS - MV DEC TIME: 0.2 SEC
BH CV ECHO MEAS - MV E MAX VEL: 120 CM/SEC
BH CV ECHO MEAS - MV E/A: 1.13
BH CV ECHO MEAS - MV MAX PG: 5.6 MMHG
BH CV ECHO MEAS - MV MEAN PG: 2.38 MMHG
BH CV ECHO MEAS - MV P1/2T: 66.8 MSEC
BH CV ECHO MEAS - MV V2 VTI: 33.4 CM
BH CV ECHO MEAS - MVA(P1/2T): 3.3 CM2
BH CV ECHO MEAS - MVA(VTI): 2.39 CM2
BH CV ECHO MEAS - PA ACC TIME: 0.06 SEC
BH CV ECHO MEAS - PA V2 MAX: 167.2 CM/SEC
BH CV ECHO MEAS - QP/QS: 1.17
BH CV ECHO MEAS - RV MAX PG: 5.6 MMHG
BH CV ECHO MEAS - RV V1 MAX: 118.1 CM/SEC
BH CV ECHO MEAS - RV V1 VTI: 20 CM
BH CV ECHO MEAS - RVOT DIAM: 2.44 CM
BH CV ECHO MEAS - SV(LVOT): 79.9 ML
BH CV ECHO MEAS - SV(MOD-SP4): 62 ML
BH CV ECHO MEAS - SV(RVOT): 93.3 ML
BH CV ECHO MEAS - SVI(LVOT): 45.6 ML/M2
BH CV ECHO MEAS - SVI(MOD-SP4): 35.4 ML/M2
BH CV VAS DIALYSIS ARTERIAL ANASTOMOSIS DIAMETER: 0.4 CM
BH CV VAS DIALYSIS ARTERIAL ANASTOMOSIS EDV: 370 CM/SEC
BH CV VAS DIALYSIS ARTERIAL ANASTOMOSIS PSV: 768 CM/SEC
BH CV VAS DIALYSIS CONDUIT DIST DEPTH: 0.8 CM
BH CV VAS DIALYSIS CONDUIT DIST DIAMETER: 0.8 CM
BH CV VAS DIALYSIS CONDUIT DIST EDV: 28 CM/SEC
BH CV VAS DIALYSIS CONDUIT DIST FLOW VOL: 1184 ML/MIN
BH CV VAS DIALYSIS CONDUIT DIST PSV: 75.8 CM/SEC
BH CV VAS DIALYSIS CONDUIT MID DEPTH: 0.3 CM
BH CV VAS DIALYSIS CONDUIT MID DIAMETER: 0.7 CM
BH CV VAS DIALYSIS CONDUIT MID EDV: 47.7 CM/SEC
BH CV VAS DIALYSIS CONDUIT MID FLOW VOL: 1235 ML/MIN
BH CV VAS DIALYSIS CONDUIT MID PSV: 103 CM/SEC
BH CV VAS DIALYSIS CONDUIT MID/DIST DEPTH: 0.2 CM
BH CV VAS DIALYSIS CONDUIT MID/DIST DIAMETER: 0.7 CM
BH CV VAS DIALYSIS CONDUIT MID/DIST EDV: 44.1 CM/SEC
BH CV VAS DIALYSIS CONDUIT MID/DIST FLOW VOL: 1454 ML/MIN
BH CV VAS DIALYSIS CONDUIT MID/DIST PSV: 88.2 CM/SEC
BH CV VAS DIALYSIS CONDUIT PROX DEPTH: 0.5 CM
BH CV VAS DIALYSIS CONDUIT PROX DIAMETER: 0.7 CM
BH CV VAS DIALYSIS CONDUIT PROX EDV: 198 CM/SEC
BH CV VAS DIALYSIS CONDUIT PROX PSV: 461 CM/SEC
BH CV VAS DIALYSIS CONDUIT PROX/MID DEPTH: 0.4 CM
BH CV VAS DIALYSIS CONDUIT PROX/MID DIAMETER: 0.7 CM
BH CV VAS DIALYSIS CONDUIT PROX/MID EDV: 68.5 CM/SEC
BH CV VAS DIALYSIS CONDUIT PROX/MID FLOW VOL: 907 ML/MIN
BH CV VAS DIALYSIS CONDUIT PROX/MID PSV: 134 CM/SEC
BH CV VAS DIALYSIS PRE-INFLOW BRACHIAL DIAMETER: 0.5 CM
BH CV VAS DIALYSIS PRE-INFLOW BRACHIAL EDV: 160 CM/SEC
BH CV VAS DIALYSIS PRE-INFLOW BRACHIAL FLOW VOL: 1502 ML/MIN
BH CV VAS DIALYSIS PRE-INFLOW BRACHIAL PSV: 341 CM/SEC
BH CV VAS DIALYSIS PRE-INFLOW SUBCLAV PSV: 296 CM/SEC
BH CV VAS DIALYSIS PRE-INFLOW SUBCLAVIAN DIAMETER: 0.1 CM
BH CV VAS DIALYSIS PRE-INFLOW SUBCLAVIAN EDV: 118 CM/SEC
BH CV VAS DIALYSIS VENOUS OUTFLOW CEPHALIC ARCH DIAMETE: 0.6 CM
BH CV VAS DIALYSIS VENOUS OUTFLOW CEPHALIC ARCH EDV: 103 CM/SEC
BH CV VAS DIALYSIS VENOUS OUTFLOW CEPHALIC ARCH PSV: 165 CM/SEC
BH CV XLRA - RV BASE: 3.6 CM
BH CV XLRA - RV LENGTH: 8.7 CM
BH CV XLRA - RV MID: 3.4 CM
BH CV XLRA - TDI S': 14.8 CM/SEC
BUN SERPL-MCNC: 11 MG/DL (ref 8–23)
BUN/CREAT SERPL: 3.8 (ref 7–25)
CALCIUM SPEC-SCNC: 9.3 MG/DL (ref 8.6–10.5)
CHLORIDE SERPL-SCNC: 103 MMOL/L (ref 98–107)
CO2 SERPL-SCNC: 21.4 MMOL/L (ref 22–29)
CREAT SERPL-MCNC: 2.92 MG/DL (ref 0.76–1.27)
DEPRECATED RDW RBC AUTO: 46.2 FL (ref 37–54)
EGFRCR SERPLBLD CKD-EPI 2021: 21.3 ML/MIN/1.73
ERYTHROCYTE [DISTWIDTH] IN BLOOD BY AUTOMATED COUNT: 14 % (ref 12.3–15.4)
GLUCOSE BLDC GLUCOMTR-MCNC: 133 MG/DL (ref 70–130)
GLUCOSE BLDC GLUCOMTR-MCNC: 142 MG/DL (ref 70–130)
GLUCOSE BLDC GLUCOMTR-MCNC: 150 MG/DL (ref 70–130)
GLUCOSE BLDC GLUCOMTR-MCNC: 80 MG/DL (ref 70–130)
GLUCOSE SERPL-MCNC: 147 MG/DL (ref 65–99)
HCT VFR BLD AUTO: 30.2 % (ref 37.5–51)
HGB BLD-MCNC: 9.3 G/DL (ref 13–17.7)
MCH RBC QN AUTO: 27.9 PG (ref 26.6–33)
MCHC RBC AUTO-ENTMCNC: 30.8 G/DL (ref 31.5–35.7)
MCV RBC AUTO: 90.7 FL (ref 79–97)
PLATELET # BLD AUTO: 139 10*3/MM3 (ref 140–450)
PMV BLD AUTO: 9 FL (ref 6–12)
POTASSIUM SERPL-SCNC: 3.9 MMOL/L (ref 3.5–5.2)
RBC # BLD AUTO: 3.33 10*6/MM3 (ref 4.14–5.8)
SINUS: 3.4 CM
SODIUM SERPL-SCNC: 137 MMOL/L (ref 136–145)
WBC NRBC COR # BLD AUTO: 6.86 10*3/MM3 (ref 3.4–10.8)

## 2024-11-04 PROCEDURE — 93990 DOPPLER FLOW TESTING: CPT | Performed by: SURGERY

## 2024-11-04 PROCEDURE — 94799 UNLISTED PULMONARY SVC/PX: CPT

## 2024-11-04 PROCEDURE — 80048 BASIC METABOLIC PNL TOTAL CA: CPT | Performed by: STUDENT IN AN ORGANIZED HEALTH CARE EDUCATION/TRAINING PROGRAM

## 2024-11-04 PROCEDURE — 94664 DEMO&/EVAL PT USE INHALER: CPT

## 2024-11-04 PROCEDURE — 99024 POSTOP FOLLOW-UP VISIT: CPT | Performed by: SURGERY

## 2024-11-04 PROCEDURE — 3E0G76Z INTRODUCTION OF NUTRITIONAL SUBSTANCE INTO UPPER GI, VIA NATURAL OR ARTIFICIAL OPENING: ICD-10-PCS | Performed by: STUDENT IN AN ORGANIZED HEALTH CARE EDUCATION/TRAINING PROGRAM

## 2024-11-04 PROCEDURE — 99232 SBSQ HOSP IP/OBS MODERATE 35: CPT | Performed by: INTERNAL MEDICINE

## 2024-11-04 PROCEDURE — 82948 REAGENT STRIP/BLOOD GLUCOSE: CPT

## 2024-11-04 PROCEDURE — 99497 ADVNCD CARE PLAN 30 MIN: CPT | Performed by: NURSE PRACTITIONER

## 2024-11-04 PROCEDURE — 25010000002 HYDROMORPHONE PER 4 MG: Performed by: NURSE PRACTITIONER

## 2024-11-04 PROCEDURE — 25010000002 OLANZAPINE 10 MG RECONSTITUTED SOLUTION: Performed by: SPECIALIST

## 2024-11-04 PROCEDURE — 92610 EVALUATE SWALLOWING FUNCTION: CPT

## 2024-11-04 PROCEDURE — 99221 1ST HOSP IP/OBS SF/LOW 40: CPT | Performed by: NURSE PRACTITIONER

## 2024-11-04 PROCEDURE — 93990 DOPPLER FLOW TESTING: CPT

## 2024-11-04 PROCEDURE — 25010000002 HEPARIN (PORCINE) PER 1000 UNITS: Performed by: STUDENT IN AN ORGANIZED HEALTH CARE EDUCATION/TRAINING PROGRAM

## 2024-11-04 PROCEDURE — 93306 TTE W/DOPPLER COMPLETE: CPT | Performed by: INTERNAL MEDICINE

## 2024-11-04 PROCEDURE — 85027 COMPLETE CBC AUTOMATED: CPT | Performed by: STUDENT IN AN ORGANIZED HEALTH CARE EDUCATION/TRAINING PROGRAM

## 2024-11-04 PROCEDURE — 93306 TTE W/DOPPLER COMPLETE: CPT

## 2024-11-04 RX ORDER — OXYCODONE HCL 5 MG/5 ML
5 SOLUTION, ORAL ORAL EVERY 6 HOURS PRN
Status: DISCONTINUED | OUTPATIENT
Start: 2024-11-04 | End: 2024-11-07

## 2024-11-04 RX ORDER — ACETAMINOPHEN 500 MG
1000 TABLET ORAL EVERY 8 HOURS
Status: DISCONTINUED | OUTPATIENT
Start: 2024-11-04 | End: 2024-11-13

## 2024-11-04 RX ORDER — LIDOCAINE 4 G/G
1 PATCH TOPICAL
Status: DISCONTINUED | OUTPATIENT
Start: 2024-11-04 | End: 2024-11-18 | Stop reason: HOSPADM

## 2024-11-04 RX ADMIN — HYDROMORPHONE HYDROCHLORIDE 0.5 MG: 1 INJECTION, SOLUTION INTRAMUSCULAR; INTRAVENOUS; SUBCUTANEOUS at 08:18

## 2024-11-04 RX ADMIN — ACETAMINOPHEN 1000 MG: 500 TABLET ORAL at 18:35

## 2024-11-04 RX ADMIN — ARFORMOTEROL TARTRATE 15 MCG: 15 SOLUTION RESPIRATORY (INHALATION) at 20:04

## 2024-11-04 RX ADMIN — HEPARIN SODIUM 5000 UNITS: 5000 INJECTION INTRAVENOUS; SUBCUTANEOUS at 05:40

## 2024-11-04 RX ADMIN — BUDESONIDE 0.5 MG: 0.5 INHALANT ORAL at 20:04

## 2024-11-04 RX ADMIN — RISPERIDONE 2 MG: 2 TABLET, FILM COATED ORAL at 22:41

## 2024-11-04 RX ADMIN — LIDOCAINE 1 PATCH: 4 PATCH TOPICAL at 11:19

## 2024-11-04 RX ADMIN — HEPARIN SODIUM 5000 UNITS: 5000 INJECTION INTRAVENOUS; SUBCUTANEOUS at 21:30

## 2024-11-04 RX ADMIN — HEPARIN SODIUM 5000 UNITS: 5000 INJECTION INTRAVENOUS; SUBCUTANEOUS at 13:33

## 2024-11-04 RX ADMIN — PANTOPRAZOLE SODIUM 40 MG: 40 INJECTION, POWDER, FOR SOLUTION INTRAVENOUS at 18:35

## 2024-11-04 RX ADMIN — ARFORMOTEROL TARTRATE 15 MCG: 15 SOLUTION RESPIRATORY (INHALATION) at 10:25

## 2024-11-04 RX ADMIN — Medication 10 ML: at 08:25

## 2024-11-04 RX ADMIN — Medication 10 ML: at 21:30

## 2024-11-04 RX ADMIN — AMITRIPTYLINE HYDROCHLORIDE 10 MG: 10 TABLET, FILM COATED ORAL at 21:29

## 2024-11-04 RX ADMIN — SERTRALINE 100 MG: 100 TABLET, FILM COATED ORAL at 21:29

## 2024-11-04 RX ADMIN — HYDROMORPHONE HYDROCHLORIDE 0.5 MG: 1 INJECTION, SOLUTION INTRAMUSCULAR; INTRAVENOUS; SUBCUTANEOUS at 02:44

## 2024-11-04 RX ADMIN — RISPERIDONE 2 MG: 2 TABLET, FILM COATED ORAL at 18:36

## 2024-11-04 RX ADMIN — GABAPENTIN 100 MG: 100 CAPSULE ORAL at 21:29

## 2024-11-04 RX ADMIN — PANTOPRAZOLE SODIUM 40 MG: 40 INJECTION, POWDER, FOR SOLUTION INTRAVENOUS at 08:20

## 2024-11-04 RX ADMIN — CARVEDILOL 3.12 MG: 3.12 TABLET, FILM COATED ORAL at 21:29

## 2024-11-04 RX ADMIN — BUDESONIDE 0.5 MG: 0.5 INHALANT ORAL at 10:28

## 2024-11-04 RX ADMIN — OLANZAPINE 5 MG: 10 INJECTION, POWDER, LYOPHILIZED, FOR SOLUTION INTRAMUSCULAR at 15:44

## 2024-11-04 NOTE — PROGRESS NOTES
[facility]   Surgical Progress Note    Patient Name: Nelson Wang  : 1945  MRN: [id number]  Date of admission: 2024  Surgical Procedures Since Admission:    Subjective   Subjective     Chief Complaint: Postdialysis catheter removal follow-up.    History of Present Illness   Resting comfortably.  Family at bedside.  Some agitation remains.    Review of Systems    Objective   Objective     Vitals:   Temp:  [97.3 °F (36.3 °C)-98.4 °F (36.9 °C)] 97.5 °F (36.4 °C)  Heart Rate:  [71-81] 79  Resp:  [16-18] 18  BP: (114-194)/(51-62) 156/51  Flow (L/min) (Oxygen Therapy):  [1] 1    Physical Exam  Constitutional:       Appearance: He is well-developed.   Pulmonary:      Effort: Pulmonary effort is normal. No respiratory distress.   Abdominal:      General: There is no distension.      Palpations: Abdomen is soft.   Neurological:      Mental Status: He is alert and oriented to person, place, and time.           Result Review    Result Review:  I have personally reviewed the results from the time of this admission to 2024 08:26 EST and agree with these findings:  [x]  Laboratory list / accordion  []  Microbiology  []  Radiology  []  EKG/Telemetry   []  Cardiology/Vascular   []  Pathology  []  Old records  []  Other:  Most notable findings include: Potassium 3.9 creatinine 2.9 white count 6.8  Blood cultures taken at 11/3/2024, no growth times 24 hours x 2.    Assessment & Plan   Assessment / Plan     Brief Patient Summary:  Nelson Wang is a 79 y.o. male who likely infected tunneled catheter with discitis.    Active Hospital Problems:   Active Hospital Problems    Diagnosis     **Discitis of lumbar region     Discitis     Metabolic encephalopathy     Aspiration pneumonia     ESRD on dialysis     Bipolar disorder     Chronic respiratory failure with hypoxia     Essential hypertension     Anemia due to chronic kidney disease     Type 2 diabetes mellitus without complication      Plan:   11/3/2024:  Dialysis catheter removed following dialysis.    11/4/2024: No urgent need for dialysis.  I have ordered a fistula duplex of the left arm fistula.    VTE Prophylaxis:  Pharmacologic & mechanical VTE prophylaxis orders are present.        Marquez Zaman MD

## 2024-11-04 NOTE — PROGRESS NOTES
The patient awakens briefly but having received Dilaudid earlier today for back pain does not participate in interview.  No restraint devices are currently in place.  Charting seems indicates that he had a better night last evening last night with increased Risperdal.

## 2024-11-04 NOTE — PLAN OF CARE
Goal Outcome Evaluation: Cortrak in place to start tube feedings, Echo done today, Vascular to check if fistula working, Pain control        Problem: Adult Inpatient Plan of Care  Goal: Plan of Care Review  Outcome: Progressing  Goal: Patient-Specific Goal (Individualized)  Outcome: Progressing  Goal: Absence of Hospital-Acquired Illness or Injury  Outcome: Progressing  Intervention: Identify and Manage Fall Risk  Recent Flowsheet Documentation  Taken 11/4/2024 1600 by Clara Ortiz RN  Safety Promotion/Fall Prevention:   activity supervised   clutter free environment maintained   fall prevention program maintained   lighting adjusted   room organization consistent   safety round/check completed  Taken 11/4/2024 1430 by Clara Ortiz RN  Safety Promotion/Fall Prevention:   activity supervised   clutter free environment maintained   fall prevention program maintained   lighting adjusted   room organization consistent   safety round/check completed  Taken 11/4/2024 1200 by Clara Ortiz RN  Safety Promotion/Fall Prevention:   activity supervised   clutter free environment maintained   fall prevention program maintained   room organization consistent   safety round/check completed  Taken 11/4/2024 1025 by Clara Ortiz RN  Safety Promotion/Fall Prevention:   activity supervised   clutter free environment maintained   fall prevention program maintained   room organization consistent   safety round/check completed   lighting adjusted  Taken 11/4/2024 0815 by Clara Ortiz RN  Safety Promotion/Fall Prevention: safety round/check completed  Intervention: Prevent Skin Injury  Recent Flowsheet Documentation  Taken 11/4/2024 1600 by Clara Ortiz RN  Body Position: position changed independently  Taken 11/4/2024 1430 by Clara Ortiz RN  Body Position: position changed independently  Taken 11/4/2024 1200 by Clara Ortiz RN  Body Position: position changed  independently  Taken 11/4/2024 1025 by Clara Ortiz RN  Body Position: position changed independently  Taken 11/4/2024 0815 by Clara Ortiz RN  Body Position: position changed independently  Skin Protection: incontinence pads utilized  Intervention: Prevent Infection  Recent Flowsheet Documentation  Taken 11/4/2024 1600 by Clara Ortiz RN  Infection Prevention:   environmental surveillance performed   single patient room provided  Taken 11/4/2024 1430 by Clara Ortiz RN  Infection Prevention: single patient room provided  Taken 11/4/2024 1200 by Clara Ortiz RN  Infection Prevention:   environmental surveillance performed   single patient room provided  Taken 11/4/2024 1025 by Clara Ortiz RN  Infection Prevention:   environmental surveillance performed   single patient room provided  Taken 11/4/2024 0815 by Clara Ortiz RN  Infection Prevention:   environmental surveillance performed   single patient room provided  Goal: Optimal Comfort and Wellbeing  Outcome: Progressing  Intervention: Provide Person-Centered Care  Recent Flowsheet Documentation  Taken 11/4/2024 1430 by Clara Ortiz RN  Trust Relationship/Rapport:   care explained   questions answered   questions encouraged  Taken 11/4/2024 0815 by Clara Ortiz RN  Trust Relationship/Rapport:   care explained   questions encouraged   questions answered  Goal: Readiness for Transition of Care  Outcome: Progressing     Problem: Fall Injury Risk  Goal: Absence of Fall and Fall-Related Injury  Outcome: Progressing  Intervention: Identify and Manage Contributors  Recent Flowsheet Documentation  Taken 11/4/2024 1200 by Clara Ortiz RN  Medication Review/Management: medications reviewed  Taken 11/4/2024 1025 by Clara Ortiz RN  Medication Review/Management: medications reviewed  Taken 11/4/2024 0815 by Clara Ortiz RN  Medication Review/Management: medications  reviewed  Intervention: Promote Injury-Free Environment  Recent Flowsheet Documentation  Taken 11/4/2024 1600 by Clara Ortiz RN  Safety Promotion/Fall Prevention:   activity supervised   clutter free environment maintained   fall prevention program maintained   lighting adjusted   room organization consistent   safety round/check completed  Taken 11/4/2024 1430 by Clara Ortiz RN  Safety Promotion/Fall Prevention:   activity supervised   clutter free environment maintained   fall prevention program maintained   lighting adjusted   room organization consistent   safety round/check completed  Taken 11/4/2024 1200 by Clara Ortiz RN  Safety Promotion/Fall Prevention:   activity supervised   clutter free environment maintained   fall prevention program maintained   room organization consistent   safety round/check completed  Taken 11/4/2024 1025 by Clara Ortiz RN  Safety Promotion/Fall Prevention:   activity supervised   clutter free environment maintained   fall prevention program maintained   room organization consistent   safety round/check completed   lighting adjusted  Taken 11/4/2024 0815 by Clara Ortiz RN  Safety Promotion/Fall Prevention: safety round/check completed     Problem: Comorbidity Management  Goal: Blood Glucose Level Within Target Range  Outcome: Progressing  Intervention: Monitor and Manage Glycemia  Recent Flowsheet Documentation  Taken 11/4/2024 1200 by Clara Ortiz RN  Medication Review/Management: medications reviewed  Taken 11/4/2024 1025 by Clara Ortiz RN  Medication Review/Management: medications reviewed  Taken 11/4/2024 0815 by Clara Ortiz RN  Medication Review/Management: medications reviewed  Goal: Blood Pressure in Desired Range  Outcome: Progressing  Intervention: Maintain Blood Pressure Management  Recent Flowsheet Documentation  Taken 11/4/2024 1200 by Clara Ortiz RN  Medication Review/Management:  medications reviewed  Taken 11/4/2024 1025 by Clara Ortiz RN  Medication Review/Management: medications reviewed  Taken 11/4/2024 0815 by Clara Ortiz RN  Medication Review/Management: medications reviewed  Goal: Maintenance of Osteoarthritis Symptom Control  Outcome: Progressing  Intervention: Maintain Osteoarthritis Symptom Control  Recent Flowsheet Documentation  Taken 11/4/2024 1600 by Clara Ortiz RN  Activity Management: bedrest  Taken 11/4/2024 1430 by Clara Ortiz RN  Activity Management: bedrest  Taken 11/4/2024 1200 by Clara Ortiz RN  Activity Management: bedrest  Medication Review/Management: medications reviewed  Taken 11/4/2024 1025 by Clara Ortiz RN  Activity Management: (laying right side) activity minimized  Medication Review/Management: medications reviewed  Taken 11/4/2024 0815 by Clara Ortiz RN  Activity Management: activity minimized  Medication Review/Management: medications reviewed     Problem: Skin Injury Risk Increased  Goal: Skin Health and Integrity  Outcome: Progressing  Intervention: Optimize Skin Protection  Recent Flowsheet Documentation  Taken 11/4/2024 1600 by Clara Ortiz RN  Activity Management: bedrest  Head of Bed (HOB) Positioning: HOB elevated  Taken 11/4/2024 1430 by Clara Ortiz RN  Activity Management: bedrest  Head of Bed (HOB) Positioning: HOB elevated  Taken 11/4/2024 1200 by Clara Ortiz RN  Activity Management: bedrest  Head of Bed (HOB) Positioning: HOB elevated  Taken 11/4/2024 1025 by Clara Ortiz RN  Activity Management: (laying right side) activity minimized  Head of Bed (HOB) Positioning: HOB elevated  Taken 11/4/2024 0815 by Clara Ortiz RN  Activity Management: activity minimized  Pressure Reduction Techniques: frequent weight shift encouraged  Head of Bed (HOB) Positioning: HOB elevated  Pressure Reduction Devices: pressure-redistributing mattress utilized  Skin  Protection: incontinence pads utilized     Problem: Enteral Nutrition  Goal: Absence of Aspiration Signs and Symptoms  Outcome: Progressing  Intervention: Minimize Aspiration Risk  Recent Flowsheet Documentation  Taken 11/4/2024 1600 by Clara Ortiz RN  Head of Bed ([facility]) Positioning: HOB elevated  Taken 11/4/2024 1430 by Clara Ortiz RN  Head of Bed ([facility]) Positioning: HOB elevated  Taken 11/4/2024 1200 by Clara Ortiz RN  Head of Bed ([facility]) Positioning: HOB elevated  Taken 11/4/2024 1025 by Clara Ortiz RN  Head of Bed ([facility]) Positioning: HOB elevated  Taken 11/4/2024 0815 by Clara Ortiz RN  Head of Bed ([facility]) Positioning: HOB elevated  Goal: Safe, Effective Therapy Delivery  Outcome: Progressing  Goal: Feeding Tolerance  Outcome: Progressing

## 2024-11-04 NOTE — PLAN OF CARE
Goal Outcome Evaluation:  Plan of Care Reviewed With: patient, spouse, child           Outcome Evaluation: Clinical swallow eval completed.  Recommend NPO with temporary means of alternate nutrition.  Recommend oral swab for comfort and frequent oral care.  Daughter reports history of oropharyngeal dysphagia with recommendations for honey thick liquid in the past.  Reports patient complied with honey thick liquid recommendations x 2 weeks and then resumed thin liquids.  Daughter reports no aspiration pneumonias x 1 year.  SLP with concerns for toleration of thin liquids based on most recent chest imaging. SLP to follow for re-eval as pain controlled and mental status improved.    Anticipated Discharge Disposition (SLP): unknown          SLP Swallowing Diagnosis: oral dysphagia, suspected pharyngeal dysphagia (11/04/24 1430)

## 2024-11-04 NOTE — PROGRESS NOTES
LOS: 3 days     Chief Complaint:  Concern for osteomyelitis/discitis     Interval History: Afebrile, status post dialysis catheter removal today.  Tolerating antibiotics without diarrhea or rash.  Confusion persists    Vital Signs  Temp:  [97.3 °F (36.3 °C)-98.4 °F (36.9 °C)] 97.5 °F (36.4 °C)  Heart Rate:  [69-81] 72  Resp:  [16-18] 18  BP: (114-194)/(51-62) 156/51    Physical Exam:  General: Ill-appearing  Cardiovascular: RRR, no lower extremity edema  Respiratory: Basilar crackles  GI: Soft, NT/ND,  Skin: No rashes     Antibiotics:  Vancomycin dosing per pharmacy     Results Review:    Lab Results   Component Value Date    WBC 6.86 11/04/2024    HGB 9.3 (L) 11/04/2024    HCT 30.2 (L) 11/04/2024    MCV 90.7 11/04/2024     (L) 11/04/2024     Lab Results   Component Value Date    GLUCOSE 147 (H) 11/04/2024    BUN 11 11/04/2024    CREATININE 2.92 (H) 11/04/2024    EGFRIFNONA 28 (L) 02/23/2022    EGFRIFAFRI 27 (L) 03/24/2022    BCR 3.8 (L) 11/04/2024    CO2 21.4 (L) 11/04/2024    CALCIUM 9.3 11/04/2024    ALBUMIN 3.0 (L) 11/02/2024    LABIL2 1.0 (L) 03/24/2022    AST 9 11/01/2024    ALT <5 11/01/2024     CRP 24.89      Microbiology:  11/2 BCx  NGTD x 2  10/31 MRSA nasal PCR negative  10/31 BCx Staphylococcus epidermidis 2    Assessment & Plan   Staphylococcus epidermidis bacteremia  T5/6 discitis/osteomyelitis  L5-S1 mild discitis  End-stage renal disease on hemodialysis complicating above  Diabetes complicating above  AMS    Continue vancomycin dosing per pharmacy.  Repeat blood cultures remain negative to date.  Anticipate a 6 week course of antibiotics dosing with dialysis.    Final antibiotic recommendation  Vancomycin dosing per pharmacy with dialysis only x 6 weeks.  Antibiotic stop date December 14  Please monitor weekly CBC with differential and vancomycin troughs and fax the results to the infectious disease clinic at [phone]  We will arrange for ID follow-up on December 13    ID will sign  off.  Please do not hesitate to call us with further questions or concerns

## 2024-11-04 NOTE — PLAN OF CARE
Goal Outcome Evaluation:              Outcome Evaluation: 2L NC, alert to person, pain managed with prn dilaudid per MD order, sitter at  bedside,Daughter at bedside, Dextrose 5%0.45NS going at 50cc/hr. plan of care ongoing this shift.

## 2024-11-04 NOTE — CASE MANAGEMENT/SOCIAL WORK
Discharge Planning Assessment  [facility]     Patient Name: Nelson Wang  MRN: [id number]  Today's Date: 11/4/2024    Admit Date: 11/1/2024    Plan: Rehab   Discharge Needs Assessment       Row Name 11/04/24 1605       Living Environment    People in Home spouse    Current Living Arrangements home    Potentially Unsafe Housing Conditions none    Primary Care Provided by self    Provides Primary Care For no one    Family Caregiver if Needed child(laura), adult    Family Caregiver Names [facility] [phone]    Quality of Family Relationships supportive    Able to Return to Prior Arrangements yes       Resource/Environmental Concerns    Resource/Environmental Concerns none    Transportation Concerns none       Transition Planning    Patient/Family Anticipates Transition to home with family;inpatient rehabilitation facility    Patient/Family Anticipated Services at Transition home health care;rehabilitation services    Transportation Anticipated family or friend will provide       Discharge Needs Assessment    Equipment Currently Used at Home none    Concerns to be Addressed no discharge needs identified    Anticipated Changes Related to Illness inability to care for self    Equipment Needed After Discharge none                   Discharge Plan       Row Name 11/04/24 1607       Plan    Plan Rehab    Patient/Family in Agreement with Plan yes    Plan Comments Met with pt, wife, and daughter in room. Introduced self, explained  role, verified face sheet. Pt's daughter stated that the plan is for him to go to rehab at discharge. They would like [facility]. He may need transportation at discharge. They denied any need for DME or HH at this time. CCP to follow. PARAG Masterson RN                  Continued Care and Services - Admitted Since 11/1/2024    No active coordination exists for this encounter.       Expected Discharge Date and Time       Expected Discharge Date Expected Discharge Time    Nov 4, 2024             Demographic Summary       Row Name 11/04/24 160       General Information    Admission Type inpatient    Referral Source admission list       Contact Information    Permission Granted to Share Info With                    Functional Status    No documentation.                  Psychosocial    No documentation.                  Abuse/Neglect    No documentation.                  Legal    No documentation.                  Substance Abuse    No documentation.                  Patient Forms    No documentation.                     Rudolph Easton RN

## 2024-11-04 NOTE — CONSULTS
Nutrition Services    Patient Name:  Nelson Wang  YOB: 1945  MRN: [id number]  Admit Date:  11/1/2024  Assessment Date:  11/04/24    Comment: Nutrition Consult Follow up  Speech evaluated again and recommends pt to remain NPO. NG cortrak placed with nasal bridle.  Labs: Na 133, cr 2.92  Per family pt has lost wt, per wt hx -170 (5.8% wt loss)  Hemodialysis yesterday and 1.5L removed.    Patient meets ASPEN/AND criteria for nutrition diagnosis of severe malnutrition of chronic illness based on: NFPE, inadequate po intake, and wt loss.    Plan/Recommendation   TF's to begin, recommend Novasource Renal at 20ml/hr and advance to goal of 40ml/hr, Water flushes [id number],   Calories  1760 kcals (100%)    Protein  80 g (100%)    Free water  634 mL plus flushes   The above end goal rate is for 22 hrs/day to assume interruptions for ADLs. Water flushes adjusted based on clinical picture + Rx flushes/IV fluids     RD to follow    CLINICAL NUTRITION ASSESSMENT      Reason for Assessment Cortrak Placement, TF Assessment    Diagnosis/Problem  lumbar discitis, AMS, ESRD on hemodialysis   Medical & Surgical Hx Past Medical History:   Diagnosis Date    Abnormal stress test     Anemia     IRON INFUSIONS WITH DIALYSIS    Anesthesia     WITH HIP REPLACEMENT DAUGHTER BELIEVES HAD SVT 2000    Ankle pain, right     Anxiety and depression     CKD (chronic kidney disease), stage IV     DIALYSIS STUE-[id number] SHILEY IN RIGHT CHEST    Diabetes     Gout     High cholesterol     History of peritoneal dialysis     HL (hearing loss)     Hyperkalemia     Hypertension     Hypothyroidism     Insomnia     Night terrors     Psoriasis     Psoriasis     Sleep walking     Thrombocytopenia     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    Vitiligo        Past Surgical History:   Procedure Laterality Date    ANKLE SURGERY Right 11/1990    APPENDECTOMY N/A 1954    ARTERIOVENOUS FISTULA/SHUNT SURGERY Left 07/16/2024    Procedure:  Creation of left arm arteriovenous fistula;  Surgeon: Moses Mir MD;  Location: [facility] MAIN OR;  Service: Vascular;  Laterality: Left;    BRONCHOSCOPY N/A 03/01/2024    Procedure: BRONCHOSCOPY WITH BAL AND WASHINGS;  Surgeon: Sandra Espinal MD;  Location: [facility] ENDOSCOPY;  Service: Pulmonary;  Laterality: N/A;  PNEUMONIA    BRONCHOSCOPY N/A 08/30/2024    Procedure: BRONCHOSCOPY WITH BALS AND WASHINGS;  Surgeon: Sandra Espinal MD;  Location: [facility] ENDOSCOPY;  Service: Pulmonary;  Laterality: N/A;  MUCOUS PLUGGING    CARDIAC CATHETERIZATION N/A 05/29/2024    Procedure: Left Heart Cath with possible coronary angioplasty;  Surgeon: Stephan Nichols MD;  Location: [facility] CATH INVASIVE LOCATION;  Service: Cardiology;  Laterality: N/A;    COLONOSCOPY N/A 06/2022    [facility]    ENDOSCOPY N/A 10/28/2024    Procedure: ESOPHAGOGASTRODUODENOSCOPY INSERTION OF LIGHTED INSTRUMENT TO VIEW ESOPHAGUS, STOMACH AND SMALL INTESTINE;  Surgeon: Kingsley Peraza MD;  Location: [facility] ENDOSCOPY;  Service: Gastroenterology;  Laterality: N/A;  Gastritis    HIP BIPOLAR REPLACEMENT Right 01/2000    INSERTION HEMODIALYSIS CATHETER Left 04/09/2024    Procedure: HEMODIALYSIS CATHETER INSERTION;  Surgeon: Jose Berry MD;  Location: [facility] OR;  Service: General;  Laterality: Left;    INSERTION HEMODIALYSIS CATHETER N/A 04/12/2024    Procedure: Tunneled hemodialysis catheter insertion;  Surgeon: Enrique Vinson MD;  Location: [facility] MAIN OR;  Service: Vascular;  Laterality: N/A;    INSERTION PERITONEAL DIALYSIS CATHETER N/A 03/27/2023    Procedure: LAPAROSCOPIC INSERTION PERITONEAL DIALYSIS CATHETER, LAPAROSCOPIC OMENTOPEXY WITH LYSIS OF ADHESIONS;  Surgeon: Jose Berry MD;  Location: [facility] OR;  Service: General;  Laterality: N/A;    INSERTION PERITONEAL DIALYSIS CATHETER Left 07/23/2023    Procedure: REVISION OF PERITONEAL DIALYSIS CATHETER;  Surgeon: Radha Oreilly MD;  Location:   [facility] MAIN OR;  Service: General;  Laterality: Left;    REMOVAL PERITONEAL DIALYSIS CATHETER N/A 04/09/2024    Procedure: REMOVAL PERITONEAL DIALYSIS CATHETER;  Surgeon: Jose Berry MD;  Location:  [facility] MAIN OR;  Service: General;  Laterality: N/A;    RENAL BIOPSY Left 07/15/2022    UPPER GASTROINTESTINAL ENDOSCOPY      WRIST SURGERY      UNSURE WHICH SIDE DAUGHTER REPORTS HAD SEVERE  CUT FROM WINDOW AND THEY HAD TO DO RECONSTRUCTIVE SURGERY WITH VESSELS AND NERVES        Current Problems Speech unable to evaluate due to AMS     Encounter Information        Nutrition History    Food Preferences    Supplements    Factors Affecting Intake abdominal pain, altered mental status   Tests/Procedures No new tests/procedures     Anthropometrics        Current Height   Current Weight  BMI kg/m2 Height: 167 cm (65.75\")  Weight: 67 kg (147 lb 11.3 oz) (11/04/24 1402)  Body mass index is 24.02 kg/m².     Adj BMI (if applicable)    BMI Category Overweight (25 - 29.9)       Admission Weight    Ideal Body Weight (IBW) 140lb     Adj IBW (if applicable)    Usual Body Weight (UBW) 160-170lb   Weight Change/Trend Loss, Gain       Weight history: Wt Readings from Last 30 Encounters:   11/04/24 1402 67 kg (147 lb 11.3 oz)   11/04/24 0410 67.9 kg (149 lb 11.1 oz)   11/01/24 0159 74.3 kg (163 lb 12.8 oz)   10/27/24 0700 72.5 kg (159 lb 13.3 oz)   10/27/24 0346 75.7 kg (166 lb 14.2 oz)   10/09/24 1402 75.7 kg (166 lb 12.8 oz)   10/08/24 1455 75.3 kg (166 lb 0.1 oz)   10/02/24 0812 77.5 kg (170 lb 13.7 oz)   09/28/24 0330 77.2 kg (170 lb 3.1 oz)   09/27/24 1659 76.2 kg (167 lb 15.9 oz)   09/26/24 0823 78.9 kg (174 lb)   09/16/24 1814 79.3 kg (174 lb 13.2 oz)   09/12/24 1344 77.1 kg (169 lb 15.6 oz)   08/30/24 0927 75.6 kg (166 lb 10.7 oz)   08/14/24 0922 76.7 kg (169 lb)   07/17/24 1116 77.2 kg (170 lb 3.2 oz)   07/16/24 0817 73.8 kg (162 lb 11.2 oz)   07/10/24 0740 73.5 kg (162 lb)   06/17/24 1604 74.6 kg (164 lb 6.4 oz) "   06/10/24 1348 80 kg (176 lb 5.9 oz)   06/06/24 1423 72 kg (158 lb 11.2 oz)   05/29/24 0720 72.9 kg (160 lb 11.5 oz)   05/28/24 1015 74.5 kg (164 lb 3.9 oz)   05/24/24 0917 74.6 kg (164 lb 6.4 oz)   05/03/24 1100 73 kg (160 lb 14.4 oz)   05/03/24 0838 74.6 kg (164 lb 6.4 oz)   05/01/24 0847 73.5 kg (162 lb)   04/24/24 1105 73.8 kg (162 lb 9.6 oz)   04/19/24 1300 74.5 kg (164 lb 3.2 oz)   04/14/24 1735 71.7 kg (158 lb 1.1 oz)   04/08/24 2128 78 kg (171 lb 15.3 oz)   03/24/24 0526 78 kg (171 lb 15.3 oz)   03/22/24 0846 75.9 kg (167 lb 6 oz)   03/19/24 1023 75.5 kg (166 lb 6.4 oz)   03/12/24 0900 75.2 kg (165 lb 12.8 oz)   03/08/24 0940 70 kg (154 lb 5.2 oz)   03/07/24 1208 73.8 kg (162 lb 11.2 oz)   03/06/24 1100 73.9 kg (163 lb)   03/05/24 0600 78 kg (171 lb 15.3 oz)   03/04/24 0601 74.6 kg (164 lb 7.4 oz)   03/02/24 0300 83.4 kg (183 lb 13.8 oz)   03/01/24 0600 80.9 kg (178 lb 5.6 oz)   02/29/24 0919 80 kg (176 lb 5.9 oz)   02/29/24 0600 75 kg (165 lb 5.5 oz)   02/28/24 2143 81.8 kg (180 lb 5.4 oz)        Estimated/Assessed Needs        Energy Requirements    Weight for Calculation 67.3   Method for Estimation  25 kcal/kg   EST Needs (kcal/day) 1675       Protein Requirements    Weight for Calculation 74.3   EST Protein Needs (g/kg) 1.0 - 1.2 gm/kg   EST Daily Needs (g/day) 74-89       Fluid Requirements     Method for Estimation 1 mL/kcal    Estimated Needs (mL/day)      Labs        Pertinent Labs    Results from last 7 days   Lab Units 11/04/24  0349 11/03/24  0531 11/02/24  0535 11/01/24  0443 10/31/24  0802 10/30/24  0436 10/29/24  0304   SODIUM mmol/L 137 138 131* 133*   < > 130* 130*   POTASSIUM mmol/L 3.9 3.6 4.2 4.8   < > 4.3 4.1   CHLORIDE mmol/L 103 103 98 98   < > 92* 94*   CO2 mmol/L 21.4* 22.0 18.0* 22.6   < > 26.6 27.3   BUN mg/dL 11 13 18 32*   < > 34* 25*   CREATININE mg/dL 2.92* 2.93* 3.49* 6.21*   < > 5.82* 4.40*   CALCIUM mg/dL 9.3 8.3* 8.7 8.5*   < > 8.8 8.4*   BILIRUBIN mg/dL  --   --   --  0.6   --  0.9 0.7   ALK PHOS U/L  --   --   --  64  --  69 65   ALT (SGPT) U/L  --   --   --  <5  --  <5 <5   AST (SGOT) U/L  --   --   --  9  --  8 8   GLUCOSE mg/dL 147* 360* 89 108*   < > 159* 209*    < > = values in this interval not displayed.     Results from last 7 days   Lab Units 11/04/24 0349 11/03/24 0532 11/03/24  0531 11/02/24  0535 11/01/24  0444 11/01/24  0443 10/31/24  0802   MAGNESIUM mg/dL  --   --  2.2  --   --  2.1 1.9   PHOSPHORUS mg/dL  --   --   --  4.2  --   --  4.0   HEMOGLOBIN g/dL 9.3*   < >  --   --    < >  --  8.1*   HEMATOCRIT % 30.2*   < >  --   --    < >  --  26.8*   WBC 10*3/mm3 6.86   < >  --   --    < >  --  6.40   ALBUMIN g/dL  --   --   --  3.0*  --  2.8* 3.2*    < > = values in this interval not displayed.     Results from last 7 days   Lab Units 11/04/24 0349 11/03/24 0532 11/02/24  0534 11/01/24  0444 10/31/24  0802   PLATELETS 10*3/mm3 139* 164 150 163 176     COVID19   Date Value Ref Range Status   10/08/2024 Not Detected Not Detected - Ref. Range Final     Lab Results   Component Value Date    HGBA1C 6.90 (H) 10/08/2024          Medications            Scheduled Medications acetaminophen, 1,000 mg, Nasogastric, Q8H  amitriptyline, 10 mg, Oral, Nightly  arformoterol, 15 mcg, Nebulization, BID - RT  atorvastatin, 40 mg, Oral, Daily  budesonide, 0.5 mg, Nebulization, BID - RT  carvedilol, 3.125 mg, Oral, Q12H  Check Fentanyl Patch Placement, 1 each, Does not apply, Q12H  gabapentin, 100 mg, Oral, Nightly  heparin (porcine), 5,000 Units, Subcutaneous, Q8H  insulin regular, 2-7 Units, Subcutaneous, Q6H  levothyroxine, 175 mcg, Oral, Q AM  Lidocaine, 1 patch, Transdermal, Q24H  pantoprazole, 40 mg, Intravenous, BID AC  risperiDONE, 2 mg, Oral, Daily With Breakfast & Dinner  risperiDONE, 2 mg, Oral, Nightly  sertraline, 100 mg, Oral, Nightly  sodium chloride, 10 mL, Intravenous, Q12H  torsemide, 100 mg, Oral, Daily  Vancomycin Pharmacy Intermittent/Pulse Dosing, , Does not apply,  Daily        Infusions dextrose 5 % and sodium chloride 0.45 %, 50 mL/hr, Last Rate: 50 mL/hr (11/03/24 5472)  Pharmacy to dose vancomycin,         PRN Medications   senna-docusate sodium **AND** polyethylene glycol **AND** bisacodyl **AND** bisacodyl    cloNIDine    dextrose    dextrose    famotidine    glucagon (human recombinant)    heparin (porcine)    HYDROmorphone    ipratropium-albuterol    labetalol    mannitol    mannitol    melatonin    nitroglycerin    OLANZapine    ondansetron    Pharmacy to dose vancomycin    sodium chloride    sodium chloride     Physical Findings          Physical Appearance agitated, disoriented, flat affect, on oxygen therapy impulsive   Oral/Mouth Cavity tooth or teeth missing   Edema  no edema   Gastrointestinal fecal incontinence, last bowel movement: 10/29   Skin  bruising   Tubes/Drains/Lines dialysis catheter   NFPE No clinical signs of muscle wasting or fat loss   --  Current Nutrition Orders & Evaluation of Intake       Oral Nutrition     Food Allergies NKFA   Current PO Diet NPO Diet NPO Type: Tube Feeding   Supplement    PO Evaluation     Trending % PO Intake    --  Nutrition Diagnosis        Nutrition Dx Problem 1 Problem: Inadequate Oral Intake  Etiology: Factors Affecting Nutrition - AMS  Signs/Symptoms: SLP/Swallow EvaluationNPO    Comment:      INTERVENTION / PLAN OF CARE  Intervention Goal        Intervention Goal(s) Reduce/improve symptoms, Meet estimated needs, Disease management/therapy, Initiate feeding/diet, Initiate TF/PN, and No significant weight loss     Nutrition Intervention        RD Action Await initiation/advancement of PO diet, Await initiation of EN/PN, Continue to monitor, Care plan reviewed, and Recommend/order: TF's     Prescription         Diet  Per speech evaluation when pt alert   Supplement/Snack    EN/PN     Prescription Ordered       Enteral Prescription:     Enteral Route NG    TF Delivery Method Continuous    Enteral Product Novasource  Renal    Modular None    Propofol Rate/Kcal     TF Start Rate 20    TF Goal Rate 40    Free Water Flush [id number]    TF Provision at Goal: 1760 kcal, 80 gm protein, 634 mL free water +  water flushes         Calories 100 % needs met         Protein  100 % needs met         Fluid (mL)     Prescription Ordered No, recommended     Monitor/Evaluation        Monitor Per protocol, I&O, PO intake, Pertinent labs, EN delivery/tolerance, Weight, GI status, Symptoms, Swallow function   Discharge Plan Pending clinical course   Education Education not appropriate at this time     RD to follow per protocol.       Electronically signed by:  Elisabeth Liao RD  11/04/24 16:18 EST

## 2024-11-04 NOTE — SIGNIFICANT NOTE
11/04/24 9749   OTHER   Discipline speech language pathologist   Rehab Time/Intention   Session Not Performed other (see comments)  (Patient not alert for eval at this time, RN reports recent dilaudid. SLP to attempt eval in PM as patient's mentation improved.)

## 2024-11-04 NOTE — PROGRESS NOTES
[facility] Progress Note      Patient Name: Nelson Wang  : 1945  MRN: [id number]  Primary Care Physician:  Domingo Bravo MD  Date of admission: 2024    Subjective     Interval History:   F/u ESRD     The patient is is eyes open but not answering question apparently received Dilaudid for his severe back pain.  He had dialysis yesterday with 1.5 L removed and his tunneled dialysis catheter was removed and waiting for [place] vascular evaluation about his AV fistula and its viability    Review of Systems:   Not obtainable    Objective     Vitals:   Temp:  [97.3 °F (36.3 °C)-98.4 °F (36.9 °C)] 97.5 °F (36.4 °C)  Heart Rate:  [69-81] 72  Resp:  [16-18] 18  BP: (114-194)/(51-62) 156/51  Flow (L/min) (Oxygen Therapy):  [1] 1    Intake/Output Summary (Last 24 hours) at 2024 1122  Last data filed at 2024 0234  Gross per 24 hour   Intake 0 ml   Output 1500 ml   Net -1500 ml       Physical Exam:    General Appearance: Chronically ill, awake with eyes open but not answering question,  Neck: No JVD  Lungs: Clear to auscultation, unlabored breathing  Heart: RRR, no rub  Abdomen: soft, nontender, nondistended  Extremities: no edema, LUE AVF with thrill and bruit    Scheduled Meds:     amitriptyline, 10 mg, Oral, Nightly  arformoterol, 15 mcg, Nebulization, BID - RT  atorvastatin, 40 mg, Oral, Daily  budesonide, 0.5 mg, Nebulization, BID - RT  carvedilol, 3.125 mg, Oral, Q12H  Check Fentanyl Patch Placement, 1 each, Does not apply, Q12H  gabapentin, 100 mg, Oral, Nightly  heparin (porcine), 5,000 Units, Subcutaneous, Q8H  insulin regular, 2-7 Units, Subcutaneous, Q6H  levothyroxine, 175 mcg, Oral, Q AM  Lidocaine, 1 patch, Transdermal, Q24H  pantoprazole, 40 mg, Intravenous, BID AC  risperiDONE, 2 mg, Oral, Daily With Breakfast & Dinner  risperiDONE, 2 mg, Oral, Nightly  sertraline, 100 mg, Oral, Nightly  sodium chloride, 10 mL, Intravenous, Q12H  torsemide, 100 mg, Oral,  Daily  Vancomycin Pharmacy Intermittent/Pulse Dosing, , Does not apply, Daily      IV Meds:   dextrose 5 % and sodium chloride 0.45 %, 50 mL/hr, Last Rate: 50 mL/hr (11/03/24 1832)  Pharmacy to dose vancomycin,         Results Reviewed:   I have personally reviewed the results from the time of this admission to 11/4/2024 11:22 EST     Results from last 7 days   Lab Units 11/04/24  0349 11/03/24  0531 11/02/24  0535 11/01/24  0443 10/31/24  0802 10/30/24  0436 10/29/24  0304   SODIUM mmol/L 137 138 131* 133*   < > 130* 130*   POTASSIUM mmol/L 3.9 3.6 4.2 4.8   < > 4.3 4.1   CHLORIDE mmol/L 103 103 98 98   < > 92* 94*   CO2 mmol/L 21.4* 22.0 18.0* 22.6   < > 26.6 27.3   BUN mg/dL 11 13 18 32*   < > 34* 25*   CREATININE mg/dL 2.92* 2.93* 3.49* 6.21*   < > 5.82* 4.40*   CALCIUM mg/dL 9.3 8.3* 8.7 8.5*   < > 8.8 8.4*   BILIRUBIN mg/dL  --   --   --  0.6  --  0.9 0.7   ALK PHOS U/L  --   --   --  64  --  69 65   ALT (SGPT) U/L  --   --   --  <5  --  <5 <5   AST (SGOT) U/L  --   --   --  9  --  8 8   GLUCOSE mg/dL 147* 360* 89 108*   < > 159* 209*    < > = values in this interval not displayed.     Estimated Creatinine Clearance: 20 mL/min (A) (by C-G formula based on SCr of 2.92 mg/dL (H)).  Results from last 7 days   Lab Units 11/03/24  0531 11/02/24  0535 11/01/24  0443 10/31/24  0802 10/30/24  0436   MAGNESIUM mg/dL 2.2  --  2.1 1.9 1.9   PHOSPHORUS mg/dL  --  4.2  --  4.0 3.5         Results from last 7 days   Lab Units 11/04/24  0349 11/03/24  0532 11/02/24  0534 11/01/24  0444 10/31/24  0802   WBC 10*3/mm3 6.86 6.03 7.09 8.81 6.40   HEMOGLOBIN g/dL 9.3* 8.4* 8.1* 8.2* 8.1*   PLATELETS 10*3/mm3 139* 164 150 163 176           Assessment / Plan     ASSESSMENT:  End-stage renal disease - been on home hemodialysis receiving 5 times a week (Dr Castro follows), livia VILLEDA TD due to LUE AVF dysfunction (placed in July).  Was dialyzed yesterday 1.5 L removed and the tunneled dialysis catheter was removed and waiting in  the evaluation of vascular surgery about the viability of his AV fistula.    Anemia of chronic kidney disease on long-acting RONALD his hemoglobin today is 9 point  Hypertension with chronic kidney disease controlled.  Hypothyroidism on replacement therapy  Acute encephalopathy   T5/6 discitis/osteomyelitis -followed by MRI with contrast 11/2 shows L1/2 & T5/6 discitis  Coag negative staph bacteremia currently on vancomycin    PLAN:  Continue the same treatment  Surveillance labs  Reevaluate tomorrow and decide about dialysis  I discussed the case with the patient's daughter over the phone      I reviewed the chart and other providers notes, reviewed labs.  Copied text in this note has been reviewed and is accurate as of 11/04/24.           Bart Wolf MD  11/04/24  11:22 [facility]    [facility]  [phone]

## 2024-11-04 NOTE — TELEPHONE ENCOUNTER
Put in orders and made a follow up appointment with . Printed up reminder and sent out in mail to patient.

## 2024-11-04 NOTE — PLAN OF CARE
Problem: Enteral Nutrition  Goal: Absence of Aspiration Signs and Symptoms  Outcome: Progressing  Goal: Safe, Effective Therapy Delivery  Outcome: Progressing  Goal: Feeding Tolerance  Outcome: Progressing   Goal Outcome Evaluation:         TF's per MD order  RD to follow

## 2024-11-04 NOTE — PROGRESS NOTES
Name: Nelson Wang ADMIT: 2024   : 1945  PCP: Domingo Bravo MD    MRN: [id number] LOS: 3 days   AGE/SEX: 79 y.o. male  ROOM: [facility] [place]     Subjective   Subjective   Patient seen this morning.  Daughter present at bedside.  Daughter reported today that he was improving however overnight again became became restless,, agitated.  Was complaining of pain, as needed IV Dilaudid was administered.  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    Review of Systems   As above  Objective   Objective   Vital Signs  Temp:  [97.3 °F (36.3 °C)-98.4 °F (36.9 °C)] 97.5 °F (36.4 °C)  Heart Rate:  [71-81] 79  Resp:  [16-18] 18  BP: (114-194)/(51-62) 156/51  SpO2:  [91 %-100 %] 92 %  on  Flow (L/min) (Oxygen Therapy):  [1] 1;   Device (Oxygen Therapy): nasal cannula  Body mass index is 24.16 kg/m².  Physical Exam    General: Lying in bed,confused, restless, ill-appearing  HEENT: Normocephalic, atraumatic  CV: Regular rate and rhythm, no murmurs   Lungs: CTA, no wheezing  Abdomen: Soft, nontender, nondistended  Extremities: No significant peripheral edema , no cyanosis     Results Review     I reviewed the patient's new clinical results.  Results from last 7 days   Lab Units 2432 24  0534 24  0444   WBC 10*3/mm3 6.86 6.03 7.09 8.81   HEMOGLOBIN g/dL 9.3* 8.4* 8.1* 8.2*   PLATELETS 10*3/mm3 139* 164 150 163     Results from last 7 days   Lab Units 24  0531 24  0535 24  0443   SODIUM mmol/L 137 138 131* 133*   POTASSIUM mmol/L 3.9 3.6 4.2 4.8   CHLORIDE mmol/L 103 103 98 98   CO2 mmol/L 21.4* 22.0 18.0* 22.6   BUN mg/dL 11 13 18 32*   CREATININE mg/dL 2.92* 2.93* 3.49* 6.21*   GLUCOSE mg/dL 147* 360* 89 108*   Estimated Creatinine Clearance: 20 mL/min (A) (by C-G formula based on SCr of 2.92 mg/dL (H)).  Results from last 7 days   Lab Units 24  0516  11/01/24  0443 10/31/24  0802 10/30/24  0436 10/29/24  0304   ALBUMIN g/dL 3.0* 2.8* 3.2* 3.3* 2.9*   BILIRUBIN mg/dL  --  0.6  --  0.9 0.7   ALK PHOS U/L  --  64  --  69 65   AST (SGOT) U/L  --  9  --  8 8   ALT (SGPT) U/L  --  <5  --  <5 <5     Results from last 7 days   Lab Units 11/04/24 0349 11/03/24  0531 11/02/24  0535 11/01/24  0443 10/31/24  0802 10/30/24  0436 10/29/24  0304   CALCIUM mg/dL 9.3 8.3* 8.7 8.5* 8.9 8.8 8.4*   ALBUMIN g/dL  --   --  3.0* 2.8* 3.2* 3.3* 2.9*   MAGNESIUM mg/dL  --  2.2  --  2.1 1.9 1.9 1.9   PHOSPHORUS mg/dL  --   --  4.2  --  4.0 3.5 3.4     Results from last 7 days   Lab Units 11/01/24  0444 10/31/24  0802 10/30/24  0436   PROCALCITONIN ng/mL  --  0.39* 0.31*   LACTATE mmol/L 0.9  --   --      COVID19   Date Value Ref Range Status   10/08/2024 Not Detected Not Detected - Ref. Range Final   09/28/2024 Not Detected Not Detected - Ref. Range Final     Glucose   Date/Time Value Ref Range Status   11/04/2024 0543 150 (H) 70 - 130 mg/dL Final   11/03/2024 2359 111 70 - 130 mg/dL Final   11/03/2024 1614 114 70 - 130 mg/dL Final   11/03/2024 1130 106 70 - 130 mg/dL Final   11/03/2024 0558 131 (H) 70 - 130 mg/dL Final   11/02/2024 2348 81 70 - 130 mg/dL Final   11/02/2024 1623 83 70 - 130 mg/dL Final           MRI Thoracic Spine With & Without Contrast, MRI Lumbar Spine With & Without Contrast, MRI Cervical Spine Without Contrast  Narrative: MRI CERVICAL SPINE WO CONTRAST-, MRI THORACIC SPINE W WO CONTRAST-, MRI  LUMBAR SPINE W WO CONTRAST-     HISTORY:  possible discitis/osteomyilitis     COMPARISON: MRI examination of the thoracic and lumbar spine 9/29/2024  CT chest 10/8/2024 and 10/31/2024     MRI CERVICAL SPINE WITHOUT CONTRAST:     The study is hampered by patient motion. The lack of contrast reduces  the sensitivity of the study.     Signal intensity involving the cervical cord is grossly normal on the  axial T2 sequence. No convincing T2 hyperintensity is  appreciated  involving the cervical discs on the motion degraded sagittal T2  sequence. There is moderate loss of disc height at C3-4, C5-6 and C6-7.  Signal loss is appreciated on the sagittal proton density sequence  extending from the base of the dens to C3-4 suggesting ossification of  posterior longitudinal ligament.     C2-3: Disc osteophyte complex is present which is more prominent on the  left. Abuts the ventral surface of the cord slightly.     C3-4: A small central disc osteophyte complex is present which mildly  abuts the ventral surface of the cord, slight more prominent to the  right. Mild foraminal stenosis is present on the right secondary to  uncovertebral degenerative disease.     C4-5: A small central disc osteophyte complex is present which is  slightly more prominent to the right.     C5-6: A small central disc osteophyte complex is present resulting in  mild flattening of ventral surface of the thecal sac. Slightly more  prominent to the right. Moderate foraminal stenosis is present on the  right secondary to uncovertebral degenerative disease.     C6/C7: A minimal central disc osteophyte complex is present.     C7-T1: There is no evidence of a disc bulge or herniation.     Impression: The study is significantly degraded by patient motion.  Additionally, the lack of contrast reduces the sensitivity of the study.  However, no gross evidence of discitis or osteomyelitis is appreciated.  There is no evidence of gross cord signal abnormality or of cord  compression. Multilevel degenerative disease involving the cervical  spine is noted as described in detail above including what is likely  ossification of posterior longitudinal ligament extending from C2 to  C3/4. See above.        MRI EXAMINATION OF THE THORACIC SPINE WITH AND WITHOUT CONTRAST:     The study is degraded by patient motion.     There is signal loss appreciated involving the T5 and T6 vertebral  bodies on the sagittal T1 sequence. On  the sagittal T2 sequence there is  increased signal intensity involving the disc space at T5/T6 and to a  lesser extent the T5 and T6 vertebral bodies. There is adjacent  edema/inflammation also evident extending from T4-T7. These findings are  new versus 9/29/2024. The CT examination of the chest from 10/31/2024  demonstrates new endplate irregularity as compared to the CT examination  of the chest performed on 10/8/2024. The findings are all consistent  with discitis/osteomyelitis. There is moderate loss of disc height and  partial fusion at T11/T11/T12. A mild compression deformity involving  T12 is noted, unchanged versus 9/29/2024.     There is no evidence of cord signal abnormality on the sagittal or axial  T2 sequences.     After contrast administration there was enhancement of the T5 and T6  vertebral bodies. Epidural enhancement is also appreciated posterior to  the T5 and T6 vertebral bodies. No convincing epidural abscess is  identified. There is enhancing soft tissue appreciated extending into  the neural foramen at T5/T6 bilaterally, more prominent on the right.     IMPRESSION: The study is degraded by patient motion however discitis is  appreciated at T5/T6 with osteomyelitis present involving the T5 and T6  vertebral bodies. Paraspinal edema and enhancement consistent with  infection/inflammatory change is appreciated as described above and  there is also epidural enhancement posterior to the T5 and T6 vertebral  bodies. Enhancement extends into the neural foramen bilaterally at  T5/T6, more prominent on the right. There is no evidence of cord  compression or of gross cord signal abnormality.        MRI EXAMINATION OF THE LUMBAR SPINE WITH AND WITHOUT CONTRAST:     The study is degraded by patient motion.     There is a mild compression deformity involving the T12 and L3 without  evidence of marrow edema. There is mild bony retropulsion of the  superior endplate of T12 and mild to moderate  retropulsion involving the  superior aspect of L3, unchanged. The sagittal T2 sequence demonstrates  a mild degree of increased signal intensity involving the disc at L1-L2  which is new versus 9/29/2024. There is a mild degree of increased  signal intensity involving the disc at L5-S1 also new versus prior  examination. There is partial visualization of a plane of T2  hyperintensity anterior to the sacrum on the sagittal T2 sequence. The  conus is at L1 and the caudal aspect the spinal cord appears  unremarkable.     L1-L2: A mild central broad-based disc osteophyte complex is present  which is more prominent to the left. Mild facet degenerative disease is  present bilaterally.      L2-L3: A mild central broad-based disc osteophyte complex is present  resulting in mild flattening of the ventral surface of the thecal sac.  Mild foraminal stenosis is present bilaterally secondary to extension of  a small disc osteophyte complex into the neural foramen.     L3-L4: A small central disc osteophyte complex is present with zones of  herniation. Mild facet degenerative disease is present bilaterally. Mild  and mild to moderate foraminal stenosis is present on the right and left  respectively secondary to extension of a small disc osteophyte complex  into the neural foramen.     L4-L5: Mild to moderate facet degenerative disease is present  bilaterally. Mild central broad-based disc osteophyte complex is present  which results in mild flattening of the ventral surface of the thecal  sac. Mild to moderate lateral recess narrowing is present bilaterally.  Mild foraminal stenosis is present bilaterally, more prominent on the  right secondary to extension of a small disc osteophyte complex into the  neural foramen.     L5-S1: Moderate facet degenerative disease is present bilaterally. A  broad-based disc osteophyte complex is present which is more prominent  to the right. There is moderate to severe lateral recess narrowing  on  the right similar appearances compared to prior examination. Moderate to  severe foraminal stenosis is also present bilaterally secondary to loss  of disc height and extension of the disc osteophyte complex into the  neural foramen, similar in appearance as compared to the prior  examination. After contrast administration there was enhancement of the  disc at L1-L2. There is mild enhancement involving the disc at L5-S1.     IMPRESSION:  1.  There is new mild T2 hyperintensity involving the disc at the level  of L1-L2 as compared to the MRI examination of the lumbar spine  performed on 9/29/2024. There is also milder T2 hyperintensity and  enhancement at L5-S1 which is also new versus a prior MRI examination.  The findings are suspicious for mild discitis were partially treated  discitis. There is no evidence of an epidural abscess. Clinical  correlation and a follow-up MRI examination of the lumbar spine is  recommended.  2.  Mild compression deformities involving T12 and L3 are appreciated  with bony retropulsion, unchanged. There is no evidence of a new  compression fracture.  3.  Multilevel degenerative disease involving lumbar spine is noted as  described above. The study is hampered by patient motion but there is no  evidence of a focal herniation. This includes moderate to severe lateral  recess narrowing to the right at L5-S1 and moderate to severe foraminal  stenosis bilaterally at L5-S1. See above.  4.  There is a thin plane of edema and enhancement anterior to the  sacrum. This is only partially visualized. Further evaluation could be  performed with a MRI examination of the pelvis with and without  contrast.        This report was finalized on 11/2/2024 2:06 PM by Dr. Donovan Richey M.D  on Workstation: [id number]       Scheduled Medications  amitriptyline, 10 mg, Oral, Nightly  arformoterol, 15 mcg, Nebulization, BID - RT  atorvastatin, 40 mg, Oral, Daily  budesonide, 0.5 mg, Nebulization, BID -  RT  carvedilol, 3.125 mg, Oral, Q12H  Check Fentanyl Patch Placement, 1 each, Does not apply, Q12H  gabapentin, 100 mg, Oral, Nightly  heparin (porcine), 5,000 Units, Subcutaneous, Q8H  insulin regular, 2-7 Units, Subcutaneous, Q6H  levothyroxine, 175 mcg, Oral, Q AM  pantoprazole, 40 mg, Intravenous, BID AC  risperiDONE, 2 mg, Oral, Daily With Breakfast & Dinner  risperiDONE, 2 mg, Oral, Nightly  sertraline, 100 mg, Oral, Nightly  sodium chloride, 10 mL, Intravenous, Q12H  torsemide, 100 mg, Oral, Daily  Vancomycin Pharmacy Intermittent/Pulse Dosing, , Does not apply, Daily    Infusions  dextrose 5 % and sodium chloride 0.45 %, 50 mL/hr, Last Rate: 50 mL/hr (11/03/24 1832)  Pharmacy to dose vancomycin,     Diet  NPO Diet NPO Type: Tube Feeding    I have personally reviewed     [x]  Laboratory   [x]  Microbiology   [x]  Radiology   [x]  EKG/Telemetry  []  Cardiology/Vascular   []  Pathology    []  Records       Assessment/Plan     Active Hospital Problems    Diagnosis  POA    **Discitis of lumbar region [M46.46]  Yes    Discitis [M46.40]  Yes    Metabolic encephalopathy [G93.41]  Yes    Aspiration pneumonia [J69.0]  Yes    ESRD on dialysis [N18.6, Z99.2]  Not Applicable    Bipolar disorder [F31.9]  Yes    Chronic respiratory failure with hypoxia [J96.11]  Yes    Essential hypertension [I10]  Yes    Anemia due to chronic kidney disease [N18.9, D63.1]  Yes    Type 2 diabetes mellitus without complication [E11.9]  Yes      Resolved Hospital Problems   No resolved problems to display.       Coag negative staph acteremia  L1/2 discitis , T5/6 discitis.   -Blood cultures 10/31/2024 positive for coag negative staph  -IV ceftriaxone discontinued, IV vancomycin continued per ID  -MRI of the cervical spine/thoracic/lumbar-imited studies but thought to have L1/2 discitis along with T5/6 discitis.   -TDC catheter removed 11/3/2024 as likely source of infection.  Vascular surgery managing  -Neurosurgery evaluated, no  intervention planned, plan for repeat MRI of lumbar and thoracic spine with and without contrast in 3 months.  -ID following  -     Metabolic encephalopathy  Hallucinations  Bipolar disorder?  -CT head 10/8/2024 with no acute findings  -Psychiatry following, adjusting medications  -Sitter at bedside  -Remains confused.    -Fentanyl discontinued as could be contributing  -Psychiatry following.  On as needed Zyprexa.  -Daughter reports patient with no history of dementia/memory issues, and was actually managing stock portfolio 1 month ago     Patient remains n.p.o. due to confusion.  If not cleared for p.o. intake today per speech therapy, will place core track.  Daughter previously was refusing now agreeable.      Hypoxemia  Pneumonia?  -CT scan 10/41/24 mild patchy and consolidative airspace opacities in the right lower lobe with small adjacent pleural effusion new from previous comparison. Differential includes atelectasis versus early developing infection. -  -On IV antibiotics as above which should cover for pneumonia as well  -Incentive parameter  -Strict n.p.o. currently due to concern for possible aspiration.  -Speech therapy following-core track  ordered 11/2/2024 however family refused, if not cleared for speech therapy will reattempt to  -core track            ESRD on HD  -Electrolytes acutely stable.    -Nephrology managing dialysis  -TDC catheter removed 11/3/2024 per vascular as likely source of infection.  Currently on dialysis holiday          Gastritis  Abdominal pain  -EGD 10/28/2024 showed gastritis  -IV PPI twice daily     Hypothyroidism  -Continue levothyroxine once able to take p.o.  -TSH 10/28/2024 was normal        Diabetes mellitus  -SSI.  Hypoglycemic protocol            Anemia of chronic disease  -Hemoglobin stable around 8.  -Monitor and transfuse as indicated for symptomatic anemia or hemoglobin less than 7      DVT prophylaxis.  DVT prophylaxis  Full code.  Palliative care  consulted.  Expected Discharge Date: 11/4/2024; Expected Discharge Time:        Copied text in this note has been reviewed and is accurate as of 11/04/24.         Dictated utilizing Dragon dictation        Fela Snyder MD  [facility]  11/04/24  09:29 EST

## 2024-11-04 NOTE — CONSULTS
[facility] Palliative Care Services    Palliative Care Initial Consult   Attending Physician: Fela Snyder MD  Referring Provider: Dr Snyder    Reason for Referral: assistance with clarification of goals of care  Family/Support: spouse and daughter    Code Status and Medical Interventions: CPR (Attempt to Resuscitate); Full Support   Ordered at: 11/01/24 0206     Code Status (Patient has no pulse and is not breathing):    CPR (Attempt to Resuscitate)     Medical Interventions (Patient has pulse or is breathing):    Full Support     Goals of Care: To treat acute conditions with hopes of recovery.    HPI:   79 y.o. male  with history of ESRD on home dialysis, HEENT, HLD,, hypothyroidism,  chronic thrombocytopenia, anxiety, depression, recent history of L3 compression fracture, DDD of lumbar spine, type II DM,, diabetes, history of alcohol use in excess, tobacco use. He resided at home prior to admission.   Patient presented to [facility] on 11/1/2024 related to concerns for T5-T6 discitis/osteomyelitis and need for evaluation from neurosurgery. He was transferred from [facility]. He was started on IV ceftriaxone and vancomycin. Consults were placed initially for neurosurgery and ID. In addition, he had significant agitation/combative behavior so psych was consulted. There was some concerns for aspiration pneumonia and speech therapy was consulted as well. Due to ESRD on hemodialysis, nephrology was consulted as well. Neurosurgery obtained further imaging to include MRI cervical/thoracic and lumbar that was concerning for  L1/2 discitis along with T5/6 discitis. They did not recommend any surgical interventions and to follow up in outpatient setting in 3 months. His hospital course was complicated with Coag negative staph bacteremia. Vascular surgery was consulted and it was recommended to remove patient tunneled dialysis catheter, which was removed on  11/3/2024.  There has been  attempts by ST to evaluate patient, however he they have not been successfu due to patient impaired cognition and patient remains NPO. There was discussion regarding placement of coretrak placement. Due to his chronic conditions, the palliative care team was consulted for support with goals of care conversation.   Palliative Care Spoke With: family   Functional Status: Was able to ambulate with minimal difficulty but significant confusion causing them to sit randomly. Pt would continue to benefit from the use of skilled physical therapy to address BLE strength and muscular endurance deficits required for ambulation. Per PT notes on 10/28/2024  Due to the Palliative Care Topics Discussed: palliative care, goals of care, care options, and discharge options we will establish an advance care plan.   Advance Care Planning   Advance Care Planning Discussion: see below          Review of Systems   Unable to perform ROS: Mental status change       1- Pain Assessment  PAINAD Breathin-->normal  PAINAD Negative Vocalization: 0-->none  PAINAD Facial Expression: 0-->smiling or inexpressive  PAINAD Body Language: 0-->relaxed  PAINAD Consolability: 0-->no need to console  PAINAD Score: 0    Past Medical History:   Diagnosis Date    Abnormal stress test     Anemia     IRON INFUSIONS WITH DIALYSIS    Anesthesia     WITH HIP REPLACEMENT DAUGHTER BELIEVES HAD SVT     Ankle pain, right     Anxiety and depression     CKD (chronic kidney disease), stage IV     DIALYSIS [id number] SHILEY IN RIGHT CHEST    Diabetes     Gout     High cholesterol     History of peritoneal dialysis     HL (hearing loss)     Hyperkalemia     Hypertension     Hypothyroidism     Insomnia     Night terrors     Psoriasis     Psoriasis     Sleep walking     Thrombocytopenia     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    Vitiligo      Past Surgical History:   Procedure Laterality Date    ANKLE SURGERY Right 1990     APPENDECTOMY N/A 1954    ARTERIOVENOUS FISTULA/SHUNT SURGERY Left 07/16/2024    Procedure: Creation of left arm arteriovenous fistula;  Surgeon: Moses Mir MD;  Location: [facility] MAIN OR;  Service: Vascular;  Laterality: Left;    BRONCHOSCOPY N/A 03/01/2024    Procedure: BRONCHOSCOPY WITH BAL AND WASHINGS;  Surgeon: Sandra Espinal MD;  Location: [facility] ENDOSCOPY;  Service: Pulmonary;  Laterality: N/A;  PNEUMONIA    BRONCHOSCOPY N/A 08/30/2024    Procedure: BRONCHOSCOPY WITH BALS AND WASHINGS;  Surgeon: Sandra Espinal MD;  Location: [facility] ENDOSCOPY;  Service: Pulmonary;  Laterality: N/A;  MUCOUS PLUGGING    CARDIAC CATHETERIZATION N/A 05/29/2024    Procedure: Left Heart Cath with possible coronary angioplasty;  Surgeon: Stephan Nichols MD;  Location: [facility] CATH INVASIVE LOCATION;  Service: Cardiology;  Laterality: N/A;    COLONOSCOPY N/A 06/2022    [facility]    ENDOSCOPY N/A 10/28/2024    Procedure: ESOPHAGOGASTRODUODENOSCOPY INSERTION OF LIGHTED INSTRUMENT TO VIEW ESOPHAGUS, STOMACH AND SMALL INTESTINE;  Surgeon: Kingsley Peraza MD;  Location: [facility] ENDOSCOPY;  Service: Gastroenterology;  Laterality: N/A;  Gastritis    HIP BIPOLAR REPLACEMENT Right 01/2000    INSERTION HEMODIALYSIS CATHETER Left 04/09/2024    Procedure: HEMODIALYSIS CATHETER INSERTION;  Surgeon: Jose Berry MD;  Location: [facility] MAIN OR;  Service: General;  Laterality: Left;    INSERTION HEMODIALYSIS CATHETER N/A 04/12/2024    Procedure: Tunneled hemodialysis catheter insertion;  Surgeon: Enrique Vinson MD;  Location: [facility] MAIN OR;  Service: Vascular;  Laterality: N/A;    INSERTION PERITONEAL DIALYSIS CATHETER N/A 03/27/2023    Procedure: LAPAROSCOPIC INSERTION PERITONEAL DIALYSIS CATHETER, LAPAROSCOPIC OMENTOPEXY WITH LYSIS OF ADHESIONS;  Surgeon: Jose Berry MD;  Location: [facility] MAIN OR;  Service: General;  Laterality: N/A;    INSERTION PERITONEAL DIALYSIS CATHETER Left 07/23/2023     Procedure: REVISION OF PERITONEAL DIALYSIS CATHETER;  Surgeon: Radha Oreilly MD;  Location: [facility] MAIN OR;  Service: General;  Laterality: Left;    REMOVAL PERITONEAL DIALYSIS CATHETER N/A 2024    Procedure: REMOVAL PERITONEAL DIALYSIS CATHETER;  Surgeon: Jose Berry MD;  Location: [facility] MAIN OR;  Service: General;  Laterality: N/A;    RENAL BIOPSY Left 07/15/2022    UPPER GASTROINTESTINAL ENDOSCOPY      WRIST SURGERY      UNSURE WHICH SIDE DAUGHTER REPORTS HAD SEVERE  CUT FROM WINDOW AND THEY HAD TO DO RECONSTRUCTIVE SURGERY WITH VESSELS AND NERVES     Social History     Socioeconomic History    Marital status:    Tobacco Use    Smoking status: Former     Current packs/day: 0.00     Average packs/day: 1 pack/day for 10.0 years (10.0 ttl pk-yrs)     Types: Cigarettes     Start date:      Quit date:      Years since quittin.8     Passive exposure: Past    Smokeless tobacco: Never    Tobacco comments:     quit 25 years ago, chews nicotine gum in past   Vaping Use    Vaping status: Never Used   Substance and Sexual Activity    Alcohol use: Yes     Comment: 1 DRINK/DAY-rarely    Drug use: Never    Sexual activity: Defer       Current Facility-Administered Medications   Medication Dose Route Frequency Provider Last Rate Last Admin    acetaminophen (TYLENOL) tablet 650 mg  650 mg Oral Q4H PRN Jes Ku APRN        Or    acetaminophen (TYLENOL) 160 MG/5ML oral solution 650 mg  650 mg Oral Q4H PRN Jes Ku APRN        Or    acetaminophen (TYLENOL) suppository 650 mg  650 mg Rectal Q4H PRN Jes Ku APRN        amitriptyline (ELAVIL) tablet 10 mg  10 mg Oral Nightly Fela Snyder MD        arformoterol (BROVANA) nebulizer solution 15 mcg  15 mcg Nebulization BID - RT Jes Ku APRN   15 mcg at 24 1025    atorvastatin (LIPITOR) tablet 40 mg  40 mg Oral Daily Fela Snyder MD        sennosides-docusate (PERICOLACE)  8.6-50 MG per tablet 2 tablet  2 tablet Oral BID PRN Jes Ku APRN        And    polyethylene glycol (MIRALAX) packet 17 g  17 g Oral Daily PRN Jes Ku APRN        And    bisacodyl (DULCOLAX) EC tablet 5 mg  5 mg Oral Daily PRN Jes Ku APRN        And    bisacodyl (DULCOLAX) suppository 10 mg  10 mg Rectal Daily PRN Jes Ku APRN        budesonide (PULMICORT) nebulizer solution 0.5 mg  0.5 mg Nebulization BID - RT Jes Ku APRN   0.5 mg at 11/04/24 1028    carvedilol (COREG) tablet 3.125 mg  3.125 mg Oral Q12H Rigo Oliva MD        Check Fentanyl Patch Placement  1 each Does not apply Q12H Jes Ku APRN        cloNIDine (CATAPRES) tablet 0.2 mg  0.2 mg Oral Q8H PRN Fela Snyder MD        dextrose (D50W) (25 g/50 mL) IV injection 25 g  25 g Intravenous Q15 Min PRN Jes Ku APRN        dextrose (GLUTOSE) oral gel 15 g  15 g Oral Q15 Min PRN Jes Ku APRN        dextrose 5 % and sodium chloride 0.45 % infusion  50 mL/hr Intravenous Continuous Fela Snyder MD 50 mL/hr at 11/03/24 1832 50 mL/hr at 11/03/24 1832    famotidine (PEPCID) tablet 20 mg  20 mg Oral BID PRN Jes Ku APRN        gabapentin (NEURONTIN) capsule 100 mg  100 mg Oral Nightly Fela Snyder MD        glucagon (GLUCAGEN) injection 1 mg  1 mg Intramuscular Q15 Min PRN Jes Ku APRN        heparin (porcine) 5000 UNIT/ML injection 5,000 Units  5,000 Units Subcutaneous Q8H Fela Snyder MD   5,000 Units at 11/04/24 0540    heparin (porcine) injection 3,800 Units  3,800 Units Intracatheter PRN Rigo Oliva MD   3,800 Units at 11/03/24 1209    HYDROmorphone (DILAUDID) injection 0.5 mg  0.5 mg Intravenous Q2H RODNEYN Germania Chow APRN   0.5 mg at 11/04/24 0818    insulin regular (humuLIN R,novoLIN R) injection 2-7 Units  2-7 Units Subcutaneous Q6H Jes Ku APRN         ipratropium-albuterol (DUO-NEB) nebulizer solution 3 mL  3 mL Nebulization Q6H PRN Fela Snyder MD        labetalol (NORMODYNE,TRANDATE) injection 10 mg  10 mg Intravenous Q6H PRN Fela Snyder MD        levothyroxine (SYNTHROID, LEVOTHROID) tablet 175 mcg  175 mcg Oral Q AM Fela Snyder MD        Lidocaine 4 % 1 patch  1 patch Transdermal Q24H Fela Snyder MD        mannitol 25% (RENAL) injection  12.5 g Intravenous PRN Bart Wolf MD        mannitol 25% (RENAL) injection  25 g Intravenous PRN Rigo Oliva MD        melatonin tablet 5 mg  5 mg Oral Nightly PRN Jes Ku APRN        nitroglycerin (NITROSTAT) SL tablet 0.4 mg  0.4 mg Sublingual Q5 Min PRN Jes Ku APRN        OLANZapine (zyPREXA) injection 5 mg  5 mg Intramuscular Q8H PRN Guy Dai III, MD   5 mg at 11/03/24 0752    ondansetron (ZOFRAN) injection 4 mg  4 mg Intravenous Q6H PRN Jes Ku APRN        pantoprazole (PROTONIX) injection 40 mg  40 mg Intravenous BID AC Fela Snyder MD   40 mg at 11/04/24 0820    Pharmacy to dose vancomycin   Does not apply Continuous PRN Jes Ku APRN        risperiDONE (risperDAL) tablet 2 mg  2 mg Oral Daily With Breakfast & Dinner Guy Dai III, MD        risperiDONE (risperDAL) tablet 2 mg  2 mg Oral Nightly Guy Dai III, MD        sertraline (ZOLOFT) tablet 100 mg  100 mg Oral Nightly Fela Snyder MD        sodium chloride 0.9 % flush 10 mL  10 mL Intravenous Q12H Jes Ku APRN   10 mL at 11/04/24 0825    sodium chloride 0.9 % flush 10 mL  10 mL Intravenous PRN Jes Ku APRN        sodium chloride 0.9 % infusion 40 mL  40 mL Intravenous PRN Jes Ku, APRN        torsemide (DEMADEX) tablet 100 mg  100 mg Oral Daily Fela Snyder MD        Vancomycin Pharmacy Intermittent/Pulse Dosing   Does not apply Daily  Jes Ku, BECKY         dextrose 5 % and sodium chloride 0.45 %, 50 mL/hr, Last Rate: 50 mL/hr (11/03/24 1832)  Pharmacy to dose vancomycin,         acetaminophen **OR** acetaminophen **OR** acetaminophen    senna-docusate sodium **AND** polyethylene glycol **AND** bisacodyl **AND** bisacodyl    cloNIDine    dextrose    dextrose    famotidine    glucagon (human recombinant)    heparin (porcine)    HYDROmorphone    ipratropium-albuterol    labetalol    mannitol    mannitol    melatonin    nitroglycerin    OLANZapine    ondansetron    Pharmacy to dose vancomycin    sodium chloride    sodium chloride    No Known Allergies  Attest that current medications reviewed  including but not limited to prescriptions, over-the counter, herbals and vitamin/mineral/dietary (nutritional) supplements for name, route of administration, type, dose and frequency and are current using all immediate resources available at time of dictation.      Intake/Output Summary (Last 24 hours) at 11/4/2024 1116  Last data filed at 11/4/2024 0234  Gross per 24 hour   Intake 0 ml   Output 1500 ml   Net -1500 ml       Physical Exam:    Diagnostics: Reviewed  /51 (BP Location: Right arm, Patient Position: Lying)   Pulse 72   Temp 97.5 °F (36.4 °C) (Oral)   Resp 18   Wt 67.9 kg (149 lb 11.1 oz)   SpO2 93%   BMI 24.16 kg/m²     Constitutional:       Appearance: Chronically ill-appearing.      Interventions: Nasal cannula in place.      Comments: NC @ 1 liter   Pulmonary:      Effort: Pulmonary effort is normal.   Cardiovascular:      PMI at left midclavicular line. Normal rate.   Edema:     Peripheral edema absent.   Abdominal:      General: Bowel sounds are normal.      Palpations: Abdomen is soft.      Tenderness: There is no abdominal tenderness.   Neurological:      Mental Status: Alert. Disoriented.   Psychiatric:         Cognition and Memory: Cognition is impaired. Memory is not impaired.      Comments: restless       Patient  status: Disease state: Deteriorating despite treatments.  Functional status: Palliative Performance Scale Score: Performance 50% based on the following measures: Ambulation: Mainly sit or lie down, Activity and Evidence of Disease: Unable to do any work, extensive evidence of disease, Self-Care: Considerable assistance required,  Intake: Normal or reduced, LOC: Full or confusion   Nutritional status: Albumin 3.0 on 11/2/2024  Body mass index is 24.16 kg/m².   Family support: The patient receives support from his wife and daughter.  Advance Directives:   unknown  POA/Healthcare surrogate-n/a.        Impression/Problem List:    Coag negative staph bacteremia   Discitis of L1/L2 and T5/T6  Aspiration pneumonia   ESRD on dialysis   Chronic respiratory failure with hypoxia   Anemia due to chronic disease  Bipolar  Diabetes mellitus type II  Hypothyroidism           Recommendations/Plan:  1. Provide support with goals of care         2.  Palliative care encounter  The patient is unable to participate in goals of care conversation due to impaired cognition. His spouse was at bedside, but defers all conversation to her daughter, Jose, who was not present. I called Jose  at 1130. I provided brief explanation of my services. She is a hospitalist and explains to me that they are not interested in our services at this time. She has very good understanding of the patient conditions are geared towards curative and recovery. She is planning to place coretrak with hopes of improving known infection. She will request our services if no clinical improvement. I was not given the opportunity to discuss CODE status. Cultural and spiritual needs have been assessed and no interventions warranted. I will sign off and see PRN. I spoke with Karstenelli.       Thank you for this consult and allowing us to participate in patient's plan of care. Palliative Care Team will sign off and see prn.    Time spent: 45 minutes spent reviewing medical and  medication records, assessing and examining patient, discussing with family, answering questions, providing some guidance about a plan and documentation of care, and coordinating care with other healthcare members, with > 50% time spent face to face.   15 minutes spent on advance care planning.    BECKY Marrufo  11/4/2024  11:16 EST

## 2024-11-04 NOTE — DISCHARGE PLACEMENT REQUEST
"Nelson Caceres (79 y.o. Male)       Date of Birth   1945    Social Security Number       Address   [address] [place] [address] [place] [address]    Home Phone   [phone]    MRN   [id number]       [employer]ian   Rastafari    Marital Status                               Admission Date   11/1/24    Admission Type   Urgent    Admitting Provider   Fela Snyder MD    Attending Provider   Fela Snyder MD    Department, Room/Bed   [address], [address]/1       Discharge Date       Discharge Disposition       Discharge Destination                                 Attending Provider: Fela Snyder MD    Allergies: No Known Allergies    Isolation: None   Infection: None   Code Status: CPR    Ht: 167 cm (65.75\")   Wt: 67 kg (147 lb 11.3 oz)    Admission Cmt: None   Principal Problem: Discitis of lumbar region [M46.46]                   Active Insurance as of 11/1/2024       Primary Coverage       Payor Plan Insurance Group Employer/Plan Group    MEDICARE MEDICARE A & B        Payor Plan Address Payor Plan Phone Number Payor Plan Fax Number Effective Dates    [address] [phone]  1/1/2011 - None Entered    [facility] [id number]         Subscriber Name Subscriber Birth Date Member ID       NELSON CACERES 1945 [id number]               Secondary Coverage       Payor Plan Insurance Group Employer/Plan Group    AARP MC SUP AARP HEALTH CARE OPTIONS        Payor Plan Address Payor Plan Phone Number Payor Plan Fax Number Effective Dates    [facility] [phone]  1/1/2021 - None Entered    [address]       [facility] [address]         Subscriber Name Subscriber Birth Date Member ID       NELSON CACERES 1945 [id number]                     Emergency Contacts        (Rel.) Home Phone Work Phone Mobile Phone    Jose Caceres (Daughter) [phone] -- [phone]    KALEIGH CACERES (Spouse) -- -- [phone]                "

## 2024-11-04 NOTE — THERAPY EVALUATION
Acute Care - Speech Language Pathology   Swallow Initial Evaluation [facility]     Patient Name: Nelson Wang  : 1945  MRN: [id number]  Today's Date: 2024               Admit Date: 2024    Visit Dx:   No diagnosis found.  Patient Active Problem List   Diagnosis    Essential hypertension    Bradycardia, sinus    Anemia due to chronic kidney disease    Hypothyroidism    Idiopathic gout    Proteinuria    Psoriasis    Thrombocytopenia    Type 2 diabetes mellitus without complication    CKD (chronic kidney disease), stage IV    Hyperlipidemia    Monoclonal gammopathy of unknown significance (MGUS)    Stage 5 chronic kidney disease    Chronic gout without tophus    Peritoneal dialysis catheter dysfunction    Medicare annual wellness visit, subsequent    Chronic cough    Peritonitis associated with peritoneal dialysis    Recurrent pneumonia    Acute on chronic respiratory failure with hypoxia    End stage renal disease    Aspiration pneumonitis    Sepsis    Type 2 diabetes mellitus, with long-term current use of insulin    GERD without esophagitis    Immunosuppression due to drug therapy    Bipolar disorder    Chronic respiratory failure with hypoxia    Shortness of breath    Abnormal stress test    ESRD on dialysis    Abnormal chest CT    Encounter for screening for malignant neoplasm of colon    History of colon polyps    Family history of colon cancer    Intractable pain    Abdominal pain    ESRD (end stage renal disease) on dialysis    AMS (altered mental status)    Hallucinations    LUQ pain    Discitis    Metabolic encephalopathy    Aspiration pneumonia    Discitis of lumbar region     Past Medical History:   Diagnosis Date    Abnormal stress test     Anemia     IRON INFUSIONS WITH DIALYSIS    Anesthesia     WITH HIP REPLACEMENT DAUGHTER BELIEVES HAD SVT     Ankle pain, right     Anxiety and depression     CKD (chronic kidney disease), stage IV     DIALYSIS [id number] AZRA IN  RIGHT CHEST    Diabetes     Gout     High cholesterol     History of peritoneal dialysis     HL (hearing loss)     Hyperkalemia     Hypertension     Hypothyroidism     Insomnia     Night terrors     Psoriasis     Psoriasis     Sleep walking     Thrombocytopenia     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    Vitiligo      Past Surgical History:   Procedure Laterality Date    ANKLE SURGERY Right 11/1990    APPENDECTOMY N/A 1954    ARTERIOVENOUS FISTULA/SHUNT SURGERY Left 07/16/2024    Procedure: Creation of left arm arteriovenous fistula;  Surgeon: Moses Mir MD;  Location: [facility] MAIN OR;  Service: Vascular;  Laterality: Left;    BRONCHOSCOPY N/A 03/01/2024    Procedure: BRONCHOSCOPY WITH BAL AND WASHINGS;  Surgeon: Sandra Espinal MD;  Location: [facility] ENDOSCOPY;  Service: Pulmonary;  Laterality: N/A;  PNEUMONIA    BRONCHOSCOPY N/A 08/30/2024    Procedure: BRONCHOSCOPY WITH BALS AND WASHINGS;  Surgeon: Sandra Espinal MD;  Location: [facility] ENDOSCOPY;  Service: Pulmonary;  Laterality: N/A;  MUCOUS PLUGGING    CARDIAC CATHETERIZATION N/A 05/29/2024    Procedure: Left Heart Cath with possible coronary angioplasty;  Surgeon: Stephan Nichols MD;  Location: [facility] CATH INVASIVE LOCATION;  Service: Cardiology;  Laterality: N/A;    COLONOSCOPY N/A 06/2022    [facility]    ENDOSCOPY N/A 10/28/2024    Procedure: ESOPHAGOGASTRODUODENOSCOPY INSERTION OF LIGHTED INSTRUMENT TO VIEW ESOPHAGUS, STOMACH AND SMALL INTESTINE;  Surgeon: Kingsley Peraza MD;  Location: [facility] ENDOSCOPY;  Service: Gastroenterology;  Laterality: N/A;  Gastritis    HIP BIPOLAR REPLACEMENT Right 01/2000    INSERTION HEMODIALYSIS CATHETER Left 04/09/2024    Procedure: HEMODIALYSIS CATHETER INSERTION;  Surgeon: Jose Berry MD;  Location: [facility] OR;  Service: General;  Laterality: Left;    INSERTION HEMODIALYSIS CATHETER N/A 04/12/2024    Procedure: Tunneled hemodialysis catheter insertion;  Surgeon: Enrique Vinson MD;   Location: [facility] MAIN OR;  Service: Vascular;  Laterality: N/A;    INSERTION PERITONEAL DIALYSIS CATHETER N/A 03/27/2023    Procedure: LAPAROSCOPIC INSERTION PERITONEAL DIALYSIS CATHETER, LAPAROSCOPIC OMENTOPEXY WITH LYSIS OF ADHESIONS;  Surgeon: Jose Berry MD;  Location[facility] MA[facility] OR;  Service: General;  Laterality: N/A;    INSERTION PERITONEAL DIALYSIS CATHETER Left 07/23/2023    Procedure: REVISION OF PERITONEAL DIALYSIS CATHETER;  Surgeon: Radha Oreilly MD;  Location[facility] MA[facility] OR;  Service: General;  Laterality: Left;    REMOVAL PERITONEAL DIALYSIS CATHETER N/A 04/09/2024    Procedure: REMOVAL PERITONEAL DIALYSIS CATHETER;  Surgeon: Jose Berry MD;  Location:  [facility] MAIN OR;  Service: General;  Laterality: N/A;    RENAL BIOPSY Left 07/15/2022    UPPER GASTROINTESTINAL ENDOSCOPY      WRIST SURGERY      UNSURE WHICH SIDE DAUGHTER REPORTS HAD SEVERE  CUT FROM WINDOW AND THEY HAD TO DO RECONSTRUCTIVE SURGERY WITH VESSELS AND NERVES       SLP Recommendation and Plan  SLP Swallowing Diagnosis: oral dysphagia, suspected pharyngeal dysphagia (11/04/24 1430)  SLP Diet Recommendation: NPO, other (see comments), temporary alternate methods of nutrition/hydration (swab for comfort) (11/04/24 1430)     SLP Rec. for Method of Medication Administration: meds via alternate route (11/04/24 1430)     Monitor for Signs of Aspiration: yes, notify SLP if any concerns (11/04/24 1430)  Recommended Diagnostics: reassess via clinical swallow evaluation (11/04/24 1430)  Swallow Criteria for Skilled Therapeutic Interventions Met: demonstrates skilled criteria (11/04/24 1430)  Anticipated Discharge Disposition (SLP): unknown (11/04/24 1430)  Rehab Potential/Prognosis, Swallowing: adequate, monitor progress closely (11/04/24 1430)  Therapy Frequency (Swallow): PRN (11/04/24 1430)  Predicted Duration Therapy Intervention (Days): until discharge (11/04/24 1430)  Oral Care Recommendations: Oral Care  BID/PRN (11/04/24 1430)                                        Outcome Evaluation: Clinical swallow eval completed.  Recommend NPO with temporary means of alternate nutrition.  Recommend oral swab for comfort and frequent oral care.  Daughter reports history of oropharyngeal dysphagia with recommendations for honey thick liquid in the past.  Reports patient complied with honey thick liquid recommendations x 2 weeks and then resumed thin liquids.  Daughter reports no aspiration pneumonias x 1 year.  SLP with concerns for toleration of thin liquids based on most recent chest imaging. SLP to follow for re-eval as pain controlled and mental status improved.      SWALLOW EVALUATION (Last 72 Hours)       SLP Adult Swallow Evaluation       Row Name 11/04/24 1430                   Rehab Evaluation    Document Type evaluation  -OC        Subjective Information no complaints  -OC        Patient Observations alert;cooperative;poorly cooperative  -OC        Patient Effort poor  -OC           General Information    Patient Profile Reviewed yes  -OC        Pertinent History Of Current Problem Patient admitted with Discitis of lumber region. Concern for aspiration at previous hospital per order. Daughter reports known history of aspiration previously requiring and noncomplaint with honey thick liquids.  -OC        Current Method of Nutrition NPO  -OC        Precautions/Limitations, Vision WFL;for purposes of eval  -OC        Precautions/Limitations, Hearing hearing impairment, bilaterally  -OC        Prior Level of Function-Communication unknown  -OC        Prior Level of Function-Swallowing honey thick liquids;other (see comments)  unknown, daughter reports compliance X2 weeks  -OC        Plans/Goals Discussed with patient and family;agreed upon  -OC        Barriers to Rehab medically complex  -OC        Patient's Goals for Discharge patient did not state  -OC        Family Goals for Discharge patient able to return to PO diet   -OC           Pain    Pain Side/Orientation lower;left  -OC           Oral Motor Structure and Function    Secretion Management WNL/WFL  -OC        Mucosal Quality moist, healthy  -OC        Volitional Swallow unable to elicit  -OC           Oral Musculature and Cranial Nerve Assessment    Oral Motor General Assessment generalized oral motor weakness  -OC        Oral Labial or Buccal Impairment, Detail, Cranial Nerve VII (Facial): reduced strength bilaterally  -OC        Lingual Impairment, Detail. Cranial Nerves IX, XII (Glossopharyngeal and Hypoglossal) reduced lingual ROM;reduced strength  -OC           Clinical Swallow Eval    Clinical Swallow Evaluation Summary Patient unable to tolerate upright positioning secondary to back pain.Patient with unintelligible speech, significant oral motor weakness. Patient demonstrated prolonged manipalation of ice chips with anterior loss X2. Unable to provide trials of liquids secondary to positioning and lack of oral control. Discussed recomnation for alternate means of nutition.  -OC           SLP Evaluation Clinical Impression    SLP Swallowing Diagnosis oral dysphagia;suspected pharyngeal dysphagia  -OC        Functional Impact risk of aspiration/pneumonia  -OC        Rehab Potential/Prognosis, Swallowing adequate, monitor progress closely  -OC        Swallow Criteria for Skilled Therapeutic Interventions Met demonstrates skilled criteria  -OC           Recommendations    Therapy Frequency (Swallow) PRN  -OC        Predicted Duration Therapy Intervention (Days) until discharge  -OC        SLP Diet Recommendation NPO;other (see comments);temporary alternate methods of nutrition/hydration  swab for comfort  -OC        Recommended Diagnostics reassess via clinical swallow evaluation  -OC        Oral Care Recommendations Oral Care BID/PRN  -OC        SLP Rec. for Method of Medication Administration meds via alternate route  -OC        Monitor for Signs of Aspiration yes;notify  SLP if any concerns  -OC        Anticipated Discharge Disposition (SLP) unknown  -OC                  User Key  (r) = Recorded By, (t) = Taken By, (c) = Cosigned By      Initials Name Effective Dates    Lesley Cardenas SLP 08/28/23 -                     EDUCATION  The patient has been educated in the following areas:   Dysphagia (Swallowing Impairment).                Time Calculation:    Time Calculation- SLP       Row Name 11/04/24 1601             Time Calculation- SLP    SLP Start Time 1430  -OC      SLP Received On 11/04/24  -OC         Untimed Charges    SLP Eval/Re-eval  ST Eval Oral Pharyng Swallow - 55420  -OC      90415-[id number] Eval Oral Pharyng Swallow Minutes 60  -OC         Total Minutes    Untimed Charges Total Minutes 60  -OC       Total Minutes 60  -OC                User Key  (r) = Recorded By, (t) = Taken By, (c) = Cosigned By      Initials Name Provider Type    Lesley Cardenas SLP Speech and Language Pathologist                    Therapy Charges for Today       Code Description Service Date Service Provider Modifiers Qty    [id number] HC ST EVAL ORAL PHARYNG SWALLOW 4 11/4/2024 Lesley Pringle SLP GN 1                 ADILSON Clark  11/4/2024

## 2024-11-05 LAB
ALBUMIN SERPL-MCNC: 3.1 G/DL (ref 3.5–5.2)
AMMONIA BLD-SCNC: 38 UMOL/L (ref 16–60)
ANION GAP SERPL CALCULATED.3IONS-SCNC: 10.6 MMOL/L (ref 5–15)
BASOPHILS # BLD AUTO: 0.01 10*3/MM3 (ref 0–0.2)
BASOPHILS NFR BLD AUTO: 0.2 % (ref 0–1.5)
BUN SERPL-MCNC: 23 MG/DL (ref 8–23)
BUN/CREAT SERPL: 4.5 (ref 7–25)
CALCIUM SPEC-SCNC: 8.9 MG/DL (ref 8.6–10.5)
CHLORIDE SERPL-SCNC: 102 MMOL/L (ref 98–107)
CO2 SERPL-SCNC: 21.4 MMOL/L (ref 22–29)
CREAT SERPL-MCNC: 5.14 MG/DL (ref 0.76–1.27)
DEPRECATED RDW RBC AUTO: 48.3 FL (ref 37–54)
EGFRCR SERPLBLD CKD-EPI 2021: 10.8 ML/MIN/1.73
EOSINOPHIL # BLD AUTO: 0.12 10*3/MM3 (ref 0–0.4)
EOSINOPHIL NFR BLD AUTO: 2.9 % (ref 0.3–6.2)
ERYTHROCYTE [DISTWIDTH] IN BLOOD BY AUTOMATED COUNT: 14.5 % (ref 12.3–15.4)
GLUCOSE BLDC GLUCOMTR-MCNC: 159 MG/DL (ref 70–130)
GLUCOSE BLDC GLUCOMTR-MCNC: 160 MG/DL (ref 70–130)
GLUCOSE BLDC GLUCOMTR-MCNC: 208 MG/DL (ref 70–130)
GLUCOSE SERPL-MCNC: 161 MG/DL (ref 65–99)
HCT VFR BLD AUTO: 27.4 % (ref 37.5–51)
HGB BLD-MCNC: 8.5 G/DL (ref 13–17.7)
IMM GRANULOCYTES # BLD AUTO: 0.02 10*3/MM3 (ref 0–0.05)
IMM GRANULOCYTES NFR BLD AUTO: 0.5 % (ref 0–0.5)
LYMPHOCYTES # BLD AUTO: 1.59 10*3/MM3 (ref 0.7–3.1)
LYMPHOCYTES NFR BLD AUTO: 38.3 % (ref 19.6–45.3)
MCH RBC QN AUTO: 28.7 PG (ref 26.6–33)
MCHC RBC AUTO-ENTMCNC: 31 G/DL (ref 31.5–35.7)
MCV RBC AUTO: 92.6 FL (ref 79–97)
MONOCYTES # BLD AUTO: 0.48 10*3/MM3 (ref 0.1–0.9)
MONOCYTES NFR BLD AUTO: 11.6 % (ref 5–12)
NEUTROPHILS NFR BLD AUTO: 1.93 10*3/MM3 (ref 1.7–7)
NEUTROPHILS NFR BLD AUTO: 46.5 % (ref 42.7–76)
NRBC BLD AUTO-RTO: 0 /100 WBC (ref 0–0.2)
PHOSPHATE SERPL-MCNC: 2.6 MG/DL (ref 2.5–4.5)
PLATELET # BLD AUTO: 126 10*3/MM3 (ref 140–450)
PMV BLD AUTO: 10.3 FL (ref 6–12)
POTASSIUM SERPL-SCNC: 3.4 MMOL/L (ref 3.5–5.2)
QT INTERVAL: 411 MS
QTC INTERVAL: 463 MS
RBC # BLD AUTO: 2.96 10*6/MM3 (ref 4.14–5.8)
SODIUM SERPL-SCNC: 134 MMOL/L (ref 136–145)
WBC NRBC COR # BLD AUTO: 4.15 10*3/MM3 (ref 3.4–10.8)

## 2024-11-05 PROCEDURE — 94761 N-INVAS EAR/PLS OXIMETRY MLT: CPT

## 2024-11-05 PROCEDURE — 94799 UNLISTED PULMONARY SVC/PX: CPT

## 2024-11-05 PROCEDURE — 82948 REAGENT STRIP/BLOOD GLUCOSE: CPT

## 2024-11-05 PROCEDURE — 63710000001 INSULIN REGULAR HUMAN PER 5 UNITS: Performed by: NURSE PRACTITIONER

## 2024-11-05 PROCEDURE — 25010000002 HEPARIN (PORCINE) PER 1000 UNITS: Performed by: STUDENT IN AN ORGANIZED HEALTH CARE EDUCATION/TRAINING PROGRAM

## 2024-11-05 PROCEDURE — 97110 THERAPEUTIC EXERCISES: CPT

## 2024-11-05 PROCEDURE — 85025 COMPLETE CBC W/AUTO DIFF WBC: CPT | Performed by: INTERNAL MEDICINE

## 2024-11-05 PROCEDURE — 99024 POSTOP FOLLOW-UP VISIT: CPT | Performed by: SURGERY

## 2024-11-05 PROCEDURE — 80069 RENAL FUNCTION PANEL: CPT | Performed by: INTERNAL MEDICINE

## 2024-11-05 PROCEDURE — 94664 DEMO&/EVAL PT USE INHALER: CPT

## 2024-11-05 PROCEDURE — 82140 ASSAY OF AMMONIA: CPT | Performed by: STUDENT IN AN ORGANIZED HEALTH CARE EDUCATION/TRAINING PROGRAM

## 2024-11-05 PROCEDURE — 25810000003 SODIUM CHLORIDE 0.9 % SOLUTION: Performed by: NURSE PRACTITIONER

## 2024-11-05 RX ORDER — AMOXICILLIN 250 MG
2 CAPSULE ORAL DAILY
Status: DISCONTINUED | OUTPATIENT
Start: 2024-11-05 | End: 2024-11-13

## 2024-11-05 RX ORDER — MIDODRINE HYDROCHLORIDE 5 MG/1
5 TABLET ORAL
Status: DISCONTINUED | OUTPATIENT
Start: 2024-11-05 | End: 2024-11-06

## 2024-11-05 RX ORDER — LEVOTHYROXINE SODIUM 175 UG/1
175 TABLET ORAL
Status: DISCONTINUED | OUTPATIENT
Start: 2024-11-05 | End: 2024-11-13

## 2024-11-05 RX ADMIN — SENNOSIDES AND DOCUSATE SODIUM 2 TABLET: 50; 8.6 TABLET ORAL at 09:09

## 2024-11-05 RX ADMIN — HEPARIN SODIUM 5000 UNITS: 5000 INJECTION INTRAVENOUS; SUBCUTANEOUS at 21:06

## 2024-11-05 RX ADMIN — BUDESONIDE 0.5 MG: 0.5 INHALANT ORAL at 21:29

## 2024-11-05 RX ADMIN — ACETAMINOPHEN 1000 MG: 500 TABLET ORAL at 09:08

## 2024-11-05 RX ADMIN — INSULIN HUMAN 2 UNITS: 100 INJECTION, SOLUTION PARENTERAL at 11:52

## 2024-11-05 RX ADMIN — TORSEMIDE 100 MG: 100 TABLET ORAL at 09:11

## 2024-11-05 RX ADMIN — ACETAMINOPHEN 1000 MG: 500 TABLET ORAL at 01:13

## 2024-11-05 RX ADMIN — HEPARIN SODIUM 5000 UNITS: 5000 INJECTION INTRAVENOUS; SUBCUTANEOUS at 13:52

## 2024-11-05 RX ADMIN — Medication 10 ML: at 09:10

## 2024-11-05 RX ADMIN — ARFORMOTEROL TARTRATE 15 MCG: 15 SOLUTION RESPIRATORY (INHALATION) at 21:29

## 2024-11-05 RX ADMIN — INSULIN HUMAN 3 UNITS: 100 INJECTION, SOLUTION PARENTERAL at 17:49

## 2024-11-05 RX ADMIN — AMITRIPTYLINE HYDROCHLORIDE 10 MG: 10 TABLET, FILM COATED ORAL at 21:05

## 2024-11-05 RX ADMIN — ATORVASTATIN CALCIUM 40 MG: 20 TABLET, FILM COATED ORAL at 09:08

## 2024-11-05 RX ADMIN — LEVOTHYROXINE SODIUM 175 MCG: 175 TABLET ORAL at 06:19

## 2024-11-05 RX ADMIN — SERTRALINE 100 MG: 100 TABLET, FILM COATED ORAL at 21:06

## 2024-11-05 RX ADMIN — INSULIN HUMAN 2 UNITS: 100 INJECTION, SOLUTION PARENTERAL at 06:24

## 2024-11-05 RX ADMIN — HEPARIN SODIUM 5000 UNITS: 5000 INJECTION INTRAVENOUS; SUBCUTANEOUS at 06:19

## 2024-11-05 RX ADMIN — PANTOPRAZOLE SODIUM 40 MG: 40 INJECTION, POWDER, FOR SOLUTION INTRAVENOUS at 16:30

## 2024-11-05 RX ADMIN — ARFORMOTEROL TARTRATE 15 MCG: 15 SOLUTION RESPIRATORY (INHALATION) at 08:59

## 2024-11-05 RX ADMIN — RISPERIDONE 2 MG: 2 TABLET, FILM COATED ORAL at 21:06

## 2024-11-05 RX ADMIN — RISPERIDONE 2 MG: 2 TABLET, FILM COATED ORAL at 09:10

## 2024-11-05 RX ADMIN — LIDOCAINE 1 PATCH: 4 PATCH TOPICAL at 09:09

## 2024-11-05 RX ADMIN — GABAPENTIN 100 MG: 100 CAPSULE ORAL at 21:07

## 2024-11-05 RX ADMIN — ACETAMINOPHEN 1000 MG: 500 TABLET ORAL at 16:28

## 2024-11-05 RX ADMIN — MIDODRINE HYDROCHLORIDE 5 MG: 5 TABLET ORAL at 16:30

## 2024-11-05 RX ADMIN — SODIUM CHLORIDE 1000 ML: 9 INJECTION, SOLUTION INTRAVENOUS at 00:08

## 2024-11-05 RX ADMIN — BUDESONIDE 0.5 MG: 0.5 INHALANT ORAL at 09:01

## 2024-11-05 RX ADMIN — Medication 10 ML: at 21:06

## 2024-11-05 RX ADMIN — MIDODRINE HYDROCHLORIDE 5 MG: 5 TABLET ORAL at 12:33

## 2024-11-05 RX ADMIN — PANTOPRAZOLE SODIUM 40 MG: 40 INJECTION, POWDER, FOR SOLUTION INTRAVENOUS at 06:31

## 2024-11-05 NOTE — PLAN OF CARE
Goal Outcome Evaluation:      Patient is a 79 y.o. male transferred from [facility] to [facility] on 11/1/2024 for metabolic encephalopathy, L1/2 discitis, T5/6 discitis and hypoxemia. PMHx includes ESRD on dialysis. Patient disoriented upon PT arrival though daughter at bedside about to provide PLOF information. Patient is independent at baseline and lives at home with spouse- no prior use of AD. Today, patient performed bed mobility with up to maxA and required maxAx2 for transfers. Patient may benefit from skilled PT services acutely to address functional deficits as well as improve level of independence prior to discharge. Anticipate need for SNF upon DC.    Anticipated Discharge Disposition (PT): skilled nursing facility

## 2024-11-05 NOTE — THERAPY EVALUATION
Patient Name: Nelson Wang  : 1945    MRN: [id number]                              Today's Date: 2024       Admit Date: 2024    Visit Dx: No diagnosis found.  Patient Active Problem List   Diagnosis    Essential hypertension    Bradycardia, sinus    Anemia due to chronic kidney disease    Hypothyroidism    Idiopathic gout    Proteinuria    Psoriasis    Thrombocytopenia    Type 2 diabetes mellitus without complication    CKD (chronic kidney disease), stage IV    Hyperlipidemia    Monoclonal gammopathy of unknown significance (MGUS)    Stage 5 chronic kidney disease    Chronic gout without tophus    Peritoneal dialysis catheter dysfunction    Medicare annual wellness visit, subsequent    Chronic cough    Peritonitis associated with peritoneal dialysis    Recurrent pneumonia    Acute on chronic respiratory failure with hypoxia    End stage renal disease    Aspiration pneumonitis    Sepsis    Type 2 diabetes mellitus, with long-term current use of insulin    GERD without esophagitis    Immunosuppression due to drug therapy    Bipolar disorder    Chronic respiratory failure with hypoxia    Shortness of breath    Abnormal stress test    ESRD on dialysis    Abnormal chest CT    Encounter for screening for malignant neoplasm of colon    History of colon polyps    Family history of colon cancer    Intractable pain    Abdominal pain    ESRD (end stage renal disease) on dialysis    AMS (altered mental status)    Hallucinations    LUQ pain    Discitis    Metabolic encephalopathy    Aspiration pneumonia    Discitis of lumbar region    Severe malnutrition     Past Medical History:   Diagnosis Date    Abnormal stress test     Anemia     IRON INFUSIONS WITH DIALYSIS    Anesthesia     WITH HIP REPLACEMENT DAUGHTER BELIEVES HAD SVT     Ankle pain, right     Anxiety and depression     CKD (chronic kidney disease), stage IV     DIALYSIS [id number] SHILEY IN RIGHT CHEST    Diabetes     Gout     High  cholesterol     History of peritoneal dialysis     HL (hearing loss)     Hyperkalemia     Hypertension     Hypothyroidism     Insomnia     Night terrors     Psoriasis     Psoriasis     Sleep walking     Thrombocytopenia     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    Vitiligo      Past Surgical History:   Procedure Laterality Date    ANKLE SURGERY Right 11/1990    APPENDECTOMY N/A 1954    ARTERIOVENOUS FISTULA/SHUNT SURGERY Left 07/16/2024    Procedure: Creation of left arm arteriovenous fistula;  Surgeon: Moses Mir MD;  Location: [facility] MAIN OR;  Service: Vascular;  Laterality: Left;    BRONCHOSCOPY N/A 03/01/2024    Procedure: BRONCHOSCOPY WITH BAL AND WASHINGS;  Surgeon: Sandra Espinal MD;  Location: [facility] ENDOSCOPY;  Service: Pulmonary;  Laterality: N/A;  PNEUMONIA    BRONCHOSCOPY N/A 08/30/2024    Procedure: BRONCHOSCOPY WITH BALS AND WASHINGS;  Surgeon: Sandra Espinal MD;  Location: [facility] ENDOSCOPY;  Service: Pulmonary;  Laterality: N/A;  MUCOUS PLUGGING    CARDIAC CATHETERIZATION N/A 05/29/2024    Procedure: Left Heart Cath with possible coronary angioplasty;  Surgeon: Stephan Nichols MD;  Location: [facility] CATH INVASIVE LOCATION;  Service: Cardiology;  Laterality: N/A;    COLONOSCOPY N/A 06/2022    [facility]    ENDOSCOPY N/A 10/28/2024    Procedure: ESOPHAGOGASTRODUODENOSCOPY INSERTION OF LIGHTED INSTRUMENT TO VIEW ESOPHAGUS, STOMACH AND SMALL INTESTINE;  Surgeon: Kingsley Peraza MD;  Location: [facility] ENDOSCOPY;  Service: Gastroenterology;  Laterality: N/A;  Gastritis    HIP BIPOLAR REPLACEMENT Right 01/2000    INSERTION HEMODIALYSIS CATHETER Left 04/09/2024    Procedure: HEMODIALYSIS CATHETER INSERTION;  Surgeon: Jose Berry MD;  Location: [facility] OR;  Service: General;  Laterality: Left;    INSERTION HEMODIALYSIS CATHETER N/A 04/12/2024    Procedure: Tunneled hemodialysis catheter insertion;  Surgeon: Enrique Vinson MD;  Location: [facility] OR;  Service: Vascular;   Laterality: N/A;    INSERTION PERITONEAL DIALYSIS CATHETER N/A 03/27/2023    Procedure: LAPAROSCOPIC INSERTION PERITONEAL DIALYSIS CATHETER, LAPAROSCOPIC OMENTOPEXY WITH LYSIS OF ADHESIONS;  Surgeon: Jose Berry MD;  Location: [facility] MAIN OR;  Service: General;  Laterality: N/A;    INSERTION PERITONEAL DIALYSIS CATHETER Left 07/23/2023    Procedure: REVISION OF PERITONEAL DIALYSIS CATHETER;  Surgeon: Radha Oreilly MD;  [facility];  Service: General;  Laterality: Left;    REMOVAL PERITONEAL DIALYSIS CATHETER N/A 04/09/2024    Procedure: REMOVAL PERITONEAL DIALYSIS CATHETER;  Surgeon: Jose Berry MD;  [facility];  Service: General;  Laterality: N/A;    RENAL BIOPSY Left 07/15/2022    UPPER GASTROINTESTINAL ENDOSCOPY      WRIST SURGERY      UNSURE WHICH SIDE DAUGHTER REPORTS HAD SEVERE  CUT FROM WINDOW AND THEY HAD TO DO RECONSTRUCTIVE SURGERY WITH VESSELS AND NERVES      General Information       Row Name 11/05/24 1534          Physical Therapy Time and Intention    Document Type evaluation  -MS     Mode of Treatment physical therapy  -MS       Row Name 11/05/24 1534          General Information    Patient Profile Reviewed yes  -MS     Prior Level of Function independent:;all household mobility  hx obtained by daughter at bedside, ind with no AD use, recent L3 fx - no use of brace or DME  -MS     Existing Precautions/Restrictions fall  soft wrist restraints  -MS     Barriers to Rehab medically complex;previous functional deficit;cognitive status  -MS       Row Name 11/05/24 1534          Living Environment    People in Home spouse  -MS       Row Name 11/05/24 1534          Home Main Entrance    Number of Stairs, Main Entrance two  -MS       Row Name 11/05/24 1534          Stairs Within Home, Primary    Number of Stairs, Within Home, Primary none  -MS       Row Name 11/05/24 1534          Cognition    Orientation Status (Cognition) disoriented  to;person;place;time;situation  -MS       Row Name 11/05/24 1534          Safety Issues/Impairments Affecting Functional Mobility    Safety Issues Affecting Function (Mobility) insight into deficits/self-awareness;sequencing abilities  -MS     Impairments Affecting Function (Mobility) balance;endurance/activity tolerance;pain;cognition;strength  -MS     Comment, Safety Issues/Impairments (Mobility) Gait belt and non skid socks donned.  -MS               User Key  (r) = Recorded By, (t) = Taken By, (c) = Cosigned By      Initials Name Provider Type    Twila Mejia, PT Physical Therapist                   Mobility       Row Name 11/05/24 1535          Bed Mobility    Bed Mobility supine-sit;sit-supine  -MS     Supine-Sit Worth (Bed Mobility) moderate assist (50% patient effort);maximum assist (25% patient effort);verbal cues;nonverbal cues (demo/gesture)  -MS     Sit-Supine Worth (Bed Mobility) maximum assist (25% patient effort);verbal cues;nonverbal cues (demo/gesture)  -MS     Assistive Device (Bed Mobility) bed rails;head of bed elevated  -MS     Comment, (Bed Mobility) VCs for initiation. Resistant initially. SBA-CGA for sitting EOB once BRONSON was corrected.  -MS       Row Name 11/05/24 1535          Transfers    Comment, (Transfers) One STS  for up to 10s to adjust linens- agitation limiting.  -MS       Row Name 11/05/24 1535          Sit-Stand Transfer    Sit-Stand Worth (Transfers) maximum assist (25% patient effort);2 person assist;verbal cues;nonverbal cues (demo/gesture)  -MS     Assistive Device (Sit-Stand Transfers) --  HHA  -MS               User Key  (r) = Recorded By, (t) = Taken By, (c) = Cosigned By      Initials Name Provider Type    Twila Mejia, PT Physical Therapist                   Obj/Interventions       Row Name 11/05/24 1536          Range of Motion Comprehensive    Comment, General Range of Motion B LEs grossly 75% WFL  -MS       Row Name 11/05/24 1536           Strength Comprehensive (MMT)    Comment, General Manual Muscle Testing (MMT) Assessment B LEs grossly 2+/5, generalized weakness noted.  -MS       Row Name 11/05/24 1536          Balance    Balance Assessment sitting static balance;standing static balance  -MS     Static Sitting Balance standby assist  -MS     Dynamic Sitting Balance contact guard  -MS     Position, Sitting Balance unsupported;sitting edge of bed  -MS     Static Standing Balance minimal assist;moderate assist  -MS     Dynamic Standing Balance moderate assist  -MS     Position/Device Used, Standing Balance supported  -MS       Row Name 11/05/24 1536          Sensory Assessment (Somatosensory)    Sensory Assessment (Somatosensory) unable/difficult to assess  -MS               User Key  (r) = Recorded By, (t) = Taken By, (c) = Cosigned By      Initials Name Provider Type    Twila Mejia, PT Physical Therapist                   Goals/Plan       Row Name 11/05/24 1537          Bed Mobility Goal 1 (PT)    Activity/Assistive Device (Bed Mobility Goal 1, PT) bed mobility activities, all  -MS     [place] Level/Cues Needed (Bed Mobility Goal 1, PT) supervision required  -MS     Time Frame (Bed Mobility Goal 1, PT) 1 week  -MS       Row Name 11/05/24 1537          Transfer Goal 1 (PT)    Activity/Assistive Device (Transfer Goal 1, PT) transfers, all;walker, rolling  -MS     [place] Level/Cues Needed (Transfer Goal 1, PT) supervision required  -MS     Time Frame (Transfer Goal 1, PT) 1 week  -MS       Row Name 11/05/24 1537          Gait Training Goal 1 (PT)    Activity/Assistive Device (Gait Training Goal 1, PT) gait (walking locomotion);walker, rolling  -MS     [place] Level (Gait Training Goal 1, PT) supervision required  -MS     Distance (Gait Training Goal 1, PT) 150  -MS     Time Frame (Gait Training Goal 1, PT) 1 week  -MS       Row Name 11/05/24 1537          Therapy Assessment/Plan (PT)    Planned Therapy Interventions  (PT) balance training;bed mobility training;gait training;home exercise program;postural re-education;patient/family education;ROM (range of motion);stair training;strengthening;transfer training  -MS               User Key  (r) = Recorded By, (t) = Taken By, (c) = Cosigned By      Initials Name Provider Type    Twila Mejia, PT Physical Therapist                   Clinical Impression       Row Name 11/05/24 1537          Pain    Pretreatment Pain Rating 0/10 - no pain  -MS     Posttreatment Pain Rating 0/10 - no pain  -MS       Row Name 11/05/24 1537          Therapy Assessment/Plan (PT)    Criteria for Skilled Interventions Met (PT) yes;meets criteria  -MS     Therapy Frequency (PT) 5 times/wk  -MS       Row Name 11/05/24 1537          Vital Signs    O2 Delivery Pre Treatment supplemental O2  -MS     O2 Delivery Intra Treatment supplemental O2  -MS     O2 Delivery Post Treatment supplemental O2  -MS       Row Name 11/05/24 1537          Positioning and Restraints    Pre-Treatment Position in bed  -MS     Post Treatment Position bed  -MS     In Bed notified nsg;fowlers;call light within reach;encouraged to call for assist;exit alarm on;with family/caregiver  -MS     Restraints reapplied:;soft limb  -MS               User Key  (r) = Recorded By, (t) = Taken By, (c) = Cosigned By      Initials Name Provider Type    Twila Mejia, PT Physical Therapist                   Outcome Measures       Row Name 11/05/24 1537 11/05/24 1420       How much help from another person do you currently need...    Turning from your back to your side while in flat bed without using bedrails? 3  -MS 3  -KT    Moving from lying on back to sitting on the side of a flat bed without bedrails? 2  -MS 3  -KT    Moving to and from a bed to a chair (including a wheelchair)? 2  -MS 3  -KT    Standing up from a chair using your arms (e.g., wheelchair, bedside chair)? 2  -MS 3  -KT    Climbing 3-5 steps with a railing? 1  -MS 2  -KT     To walk in hospital room? 2  -MS 3  -KT    AM-PAC 6 Clicks Score (PT) 12  -MS 17  -KT      Row Name 11/05/24 0815          How much help from another person do you currently need...    Turning from your back to your side while in flat bed without using bedrails? 3  -KT     Moving from lying on back to sitting on the side of a flat bed without bedrails? 3  -KT     Moving to and from a bed to a chair (including a wheelchair)? 3  -KT     Standing up from a chair using your arms (e.g., wheelchair, bedside chair)? 3  -KT     Climbing 3-5 steps with a railing? 2  -KT     To walk in hospital room? 3  -KT     AM-PAC 6 Clicks Score (PT) 17  -KT               User Key  (r) = Recorded By, (t) = Taken By, (c) = Cosigned By      Initials Name Provider Type    MS Twila Puente, PT Physical Therapist    Clara Elizondo RN Registered Nurse                                 Physical Therapy Education       Title: PT OT SLP Therapies (In Progress)       Topic: Physical Therapy (In Progress)       Point: Mobility training (In Progress)       Learning Progress Summary            Patient Acceptance, E,TB, NR by MS at 11/5/2024 1538    Acceptance, E, NL,NR by KT at 11/4/2024 1144                      Point: Home exercise program (In Progress)       Learning Progress Summary            Patient Acceptance, E,TB, NR by MS at 11/5/2024 1538    Acceptance, E, NL,NR by KT at 11/4/2024 1144                      Point: Body mechanics (In Progress)       Learning Progress Summary            Patient Acceptance, E,TB, NR by MS at 11/5/2024 1538    Acceptance, E, NL,NR by KT at 11/4/2024 1144                      Point: Precautions (In Progress)       Learning Progress Summary            Patient Acceptance, E,TB, NR by MS at 11/5/2024 1538    Acceptance, E, NL,NR by KT at 11/4/2024 1144                                      User Key       Initials Effective Dates Name Provider Type Discipline    MS 06/16/21 -  Twila Puente, PT  Physical Therapist PT    KT 09/04/24 -  Clara Ortiz RN Registered Nurse Nurse                  PT Recommendation and Plan  Planned Therapy Interventions (PT): balance training, bed mobility training, gait training, home exercise program, postural re-education, patient/family education, ROM (range of motion), stair training, strengthening, transfer training        Time Calculation:         PT Charges       Row Name 11/05/24 1534             Time Calculation    Start Time 1516  -MS      Stop Time 1532  -MS      Time Calculation (min) 16 min  -MS      PT Received On 11/05/24  -MS      PT - Next Appointment 11/06/24  -MS      PT Goal Re-Cert Due Date 11/12/24  -MS                User Key  (r) = Recorded By, (t) = Taken By, (c) = Cosigned By      Initials Name Provider Type    Twila Mejia, PT Physical Therapist                  Therapy Charges for Today       Code Description Service Date Service Provider Modifiers Qty    84571850132 HC PT THER PROC EA 15 MIN 11/5/2024 Twila Puente, PT GP 1    62849896563 HC PT THER SUPP EA 15 MIN 11/5/2024 Twila Puente, PT GP 1            PT G-Codes  AM-PAC 6 Clicks Score (PT): 12  PT Discharge Summary  Anticipated Discharge Disposition (PT): skilled nursing facility    Twila Puente PT  11/5/2024

## 2024-11-05 NOTE — PROGRESS NOTES
[facility] Progress Note      Patient Name: Nelson Wang  : 1945  MRN: [id number]  Primary Care Physician:  Domingo Bravo MD  Date of admission: 2024    Subjective     Interval History:   F/u ESRD     The patient has a core track in place he is poorly responsive and he is restrained had Doppler of the AV fistula revealed patent fistula with some proximal stenosis and mild anastomotic stenosis.  Waiting for vascular surgery to clear the use of the fistula.  Review of Systems:   Not obtainable    Objective     Vitals:   Temp:  [97.7 °F (36.5 °C)-99.2 °F (37.3 °C)] 97.7 °F (36.5 °C)  Heart Rate:  [69-90] 86  Resp:  [16-18] 18  BP: ()/(36-67) 111/57  Flow (L/min) (Oxygen Therapy):  [1-2] 1    Intake/Output Summary (Last 24 hours) at 2024 0935  Last data filed at 2024 0600  Gross per 24 hour   Intake 404 ml   Output --   Net 404 ml       Physical Exam:    General Appearance: Chronically ill, awake with eyes open but not answering question, he has soft wrist restraints  HEENT: Core track in place  Neck: No JVD  Lungs: Scattered rhonchi, unlabored breathing  Heart: RRR, no rub  Abdomen: soft, nontender, nondistended  Extremities: no edema, LUE AVF with thrill and bruit    Scheduled Meds:     acetaminophen, 1,000 mg, Nasogastric, Q8H  amitriptyline, 10 mg, Oral, Nightly  arformoterol, 15 mcg, Nebulization, BID - RT  atorvastatin, 40 mg, Oral, Daily  budesonide, 0.5 mg, Nebulization, BID - RT  [Held by provider] carvedilol, 3.125 mg, Oral, Q12H  Check Fentanyl Patch Placement, 1 each, Does not apply, Q12H  gabapentin, 100 mg, Oral, Nightly  heparin (porcine), 5,000 Units, Subcutaneous, Q8H  insulin regular, 2-7 Units, Subcutaneous, Q6H  levothyroxine, 175 mcg, Nasogastric, Q AM  Lidocaine, 1 patch, Transdermal, Q24H  pantoprazole, 40 mg, Intravenous, BID AC  risperiDONE, 2 mg, Oral, Daily With Breakfast & Dinner  risperiDONE, 2 mg, Oral, Nightly  senna-docusate  sodium, 2 tablet, Nasogastric, Daily  sertraline, 100 mg, Oral, Nightly  sodium chloride, 10 mL, Intravenous, Q12H  torsemide, 100 mg, Oral, Daily  Vancomycin Pharmacy Intermittent/Pulse Dosing, , Does not apply, Daily      IV Meds:   Pharmacy to dose vancomycin,         Results Reviewed:   I have personally reviewed the results from the time of this admission to 11/5/2024 09:35 EST     Results from last 7 days   Lab Units 11/05/24  0556 11/04/24  0349 11/03/24 0531 11/02/24 0535 11/01/24  0443 10/31/24  0802 10/30/24  0436   SODIUM mmol/L 134* 137 138   < > 133*   < > 130*   POTASSIUM mmol/L 3.4* 3.9 3.6   < > 4.8   < > 4.3   CHLORIDE mmol/L 102 103 103   < > 98   < > 92*   CO2 mmol/L 21.4* 21.4* 22.0   < > 22.6   < > 26.6   BUN mg/dL 23 11 13   < > 32*   < > 34*   CREATININE mg/dL 5.14* 2.92* 2.93*   < > 6.21*   < > 5.82*   CALCIUM mg/dL 8.9 9.3 8.3*   < > 8.5*   < > 8.8   BILIRUBIN mg/dL  --   --   --   --  0.6  --  0.9   ALK PHOS U/L  --   --   --   --  64  --  69   ALT (SGPT) U/L  --   --   --   --  <5  --  <5   AST (SGOT) U/L  --   --   --   --  9  --  8   GLUCOSE mg/dL 161* 147* 360*   < > 108*   < > 159*    < > = values in this interval not displayed.     Estimated Creatinine Clearance: 11.5 mL/min (A) (by C-G formula based on SCr of 5.14 mg/dL (H)).  Results from last 7 days   Lab Units 11/05/24  0556 11/03/24 0531 11/02/24  0535 11/01/24  0443 10/31/24  0802   MAGNESIUM mg/dL  --  2.2  --  2.1 1.9   PHOSPHORUS mg/dL 2.6  --  4.2  --  4.0         Results from last 7 days   Lab Units 11/05/24  0556 11/04/24  0349 11/03/24  0532 11/02/24  0534 11/01/24  0444   WBC 10*3/mm3 4.15 6.86 6.03 7.09 8.81   HEMOGLOBIN g/dL 8.5* 9.3* 8.4* 8.1* 8.2*   PLATELETS 10*3/mm3 126* 139* 164 150 163           Assessment / Plan     ASSESSMENT:  End-stage renal disease - been on home hemodialysis receiving 5 times a week (Dr Castro follows), via [facility] due to LUE AVF dysfunction (placed in July).  Catheter was removed and  being evaluated by vascular surgery about the use of the AV fistula, the patient appears to be euvolemic, sodium 134 and potassium 3.4 no indication for urgent dialysis at this time.  Anemia of chronic kidney disease on long-acting RONALD his hemoglobin today is 8.5, platelet 126,000 slightly decreased  Hypertension with chronic kidney disease controlled.  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  Copied text in this note has been reviewed and is accurate as of 11/05/24.           Bart Wolf MD  11/05/24  09:35 [facility]    [facility]  [phone]

## 2024-11-05 NOTE — PROGRESS NOTES
[facility]   Surgical Progress Note    Patient Name: Nelson Wang  : 1945  MRN: [id number]  Date of admission: 2024  Surgical Procedures Since Admission:    Subjective   Subjective     Chief Complaint: Postdialysis catheter removal follow-up.    History of Present Illness   Resting comfortably.  Some agitation remains.    Review of Systems    Objective   Objective     Vitals:   Temp:  [97.7 °F (36.5 °C)-99.2 °F (37.3 °C)] 97.7 °F (36.5 °C)  Heart Rate:  [74-90] 86  Resp:  [16-18] 18  BP: ()/(36-67) 111/57  Flow (L/min) (Oxygen Therapy):  [1-2] 1    Physical Exam  Constitutional:       Appearance: He is well-developed.   Pulmonary:      Effort: Pulmonary effort is normal. No respiratory distress.   Abdominal:      General: There is no distension.      Palpations: Abdomen is soft.   Neurological:      Mental Status: He is alert and oriented to person, place, and time.           Result Review    Result Review:  I have personally reviewed the results from the time of this admission to 2024 11:06 EST and agree with these findings:  [x]  Laboratory list / accordion  []  Microbiology  []  Radiology  []  EKG/Telemetry   []  Cardiology/Vascular   []  Pathology  []  Old records  []  Other:  Most notable findings include: Potassium 3.9 creatinine 2.9 white count 6.8  Blood cultures taken at 11/3/2024, no growth times 24 hours x 2.    Assessment & Plan   Assessment / Plan     Brief Patient Summary:  Nelson Wang is a 79 y.o. male who likely infected tunneled catheter with discitis.    Active Hospital Problems:   Active Hospital Problems    Diagnosis     **Discitis of lumbar region     Severe malnutrition     Discitis     Metabolic encephalopathy     Aspiration pneumonia     ESRD on dialysis     Bipolar disorder     Chronic respiratory failure with hypoxia     Essential hypertension     Anemia due to chronic kidney disease     Type 2 diabetes mellitus without complication      Plan:   11/3/2024:  Dialysis catheter removed following dialysis.    11/4/2024: No urgent need for dialysis.  I have ordered a fistula duplex of the left arm fistula.    11/5/2024: Fistula duplex reviewed.  Fistula based upon duplex criteria appears mature and able to be utilized.  Case discussed with nephrology this morning.  Concerns are for his agitation would prohibit him from allowing access of his left arm fistula.  After discussion with nephrology will allow attempted cannulization.  If this fails due to technical issues or agitation related issues would require tunneled catheter later this week.    VTE Prophylaxis:  Pharmacologic & mechanical VTE prophylaxis orders are present.        Marquez Zaman MD

## 2024-11-05 NOTE — PLAN OF CARE
Goal Outcome Evaluation: Patient is tolerating nutrition fine, Patient still in restraints preventing him from pulling cortrak, order for restraints renewed 11/5/24 at 1409. Patient worked with physical therapist and was able to stand at bedside and dangle feet. Still controlling pain.           Problem: Adult Inpatient Plan of Care  Goal: Plan of Care Review  Outcome: Progressing  Goal: Patient-Specific Goal (Individualized)  Outcome: Progressing  Goal: Absence of Hospital-Acquired Illness or Injury  Outcome: Progressing  Intervention: Identify and Manage Fall Risk  Recent Flowsheet Documentation  Taken 11/5/2024 1600 by Clara Ortiz RN  Safety Promotion/Fall Prevention:   activity supervised   clutter free environment maintained   fall prevention program maintained   lighting adjusted   room organization consistent   safety round/check completed  Taken 11/5/2024 1420 by Clara Ortiz RN  Safety Promotion/Fall Prevention:   activity supervised   clutter free environment maintained   fall prevention program maintained   room organization consistent   safety round/check completed   lighting adjusted  Taken 11/5/2024 1210 by Clara Ortiz RN  Safety Promotion/Fall Prevention: activity supervised  Taken 11/5/2024 1000 by Clara Ortiz RN  Safety Promotion/Fall Prevention:   activity supervised   clutter free environment maintained   fall prevention program maintained   lighting adjusted   room organization consistent   safety round/check completed  Taken 11/5/2024 0815 by Clara Ortiz RN  Safety Promotion/Fall Prevention:   activity supervised   clutter free environment maintained   fall prevention program maintained   room organization consistent   safety round/check completed  Intervention: Prevent Skin Injury  Recent Flowsheet Documentation  Taken 11/5/2024 1600 by Clara Ortiz RN  Body Position: position changed independently  Taken 11/5/2024 1420 by Clara Ortiz  RN  Body Position: position changed independently  Skin Protection: incontinence pads utilized  Taken 11/5/2024 1210 by Clara Ortiz RN  Body Position: weight shifting  Taken 11/5/2024 1000 by Clara Ortiz RN  Body Position: weight shifting  Taken 11/5/2024 0815 by Clara Ortiz RN  Body Position: (left side) side-lying  Skin Protection: incontinence pads utilized  Intervention: Prevent and Manage VTE (Venous Thromboembolism) Risk  Recent Flowsheet Documentation  Taken 11/5/2024 0815 by Clara Ortiz RN  VTE Prevention/Management: (subq heparin) other (see comments)  Intervention: Prevent Infection  Recent Flowsheet Documentation  Taken 11/5/2024 1600 by Clara Ortiz RN  Infection Prevention:   environmental surveillance performed   single patient room provided  Taken 11/5/2024 1420 by Clara Ortiz RN  Infection Prevention: single patient room provided  Taken 11/5/2024 1210 by Clara Ortiz RN  Infection Prevention:   environmental surveillance performed   single patient room provided  Taken 11/5/2024 1000 by Clara Ortiz RN  Infection Prevention:   environmental surveillance performed   single patient room provided  Taken 11/5/2024 0815 by Clara Ortiz RN  Infection Prevention:   environmental surveillance performed   single patient room provided  Goal: Optimal Comfort and Wellbeing  Outcome: Progressing  Intervention: Monitor Pain and Promote Comfort  Recent Flowsheet Documentation  Taken 11/5/2024 1420 by Clara Ortiz RN  Pain Management Interventions: pillow support provided  Taken 11/5/2024 0815 by Clara Ortiz RN  Pain Management Interventions:   pillow support provided   pain medication given  Intervention: Provide Person-Centered Care  Recent Flowsheet Documentation  Taken 11/5/2024 1420 by Clara Ortiz RN  Trust Relationship/Rapport:   care explained   questions answered   questions encouraged  Taken 11/5/2024 0815  by Clara Ortiz RN  Trust Relationship/Rapport:   care explained   questions answered   questions encouraged  Goal: Readiness for Transition of Care  Outcome: Progressing     Problem: Fall Injury Risk  Goal: Absence of Fall and Fall-Related Injury  Outcome: Progressing  Intervention: Identify and Manage Contributors  Recent Flowsheet Documentation  Taken 11/5/2024 1600 by Clara Ortiz RN  Medication Review/Management: medications reviewed  Taken 11/5/2024 1420 by Clara Ortiz RN  Medication Review/Management: medications reviewed  Taken 11/5/2024 1210 by Clara Ortiz RN  Medication Review/Management: medications reviewed  Taken 11/5/2024 0815 by Clara Ortiz RN  Medication Review/Management: medications reviewed  Intervention: Promote Injury-Free Environment  Recent Flowsheet Documentation  Taken 11/5/2024 1600 by Clara Ortiz RN  Safety Promotion/Fall Prevention:   activity supervised   clutter free environment maintained   fall prevention program maintained   lighting adjusted   room organization consistent   safety round/check completed  Taken 11/5/2024 1420 by Clara Ortiz RN  Safety Promotion/Fall Prevention:   activity supervised   clutter free environment maintained   fall prevention program maintained   room organization consistent   safety round/check completed   lighting adjusted  Taken 11/5/2024 1210 by Clara Ortiz RN  Safety Promotion/Fall Prevention: activity supervised  Taken 11/5/2024 1000 by Clara Ortiz RN  Safety Promotion/Fall Prevention:   activity supervised   clutter free environment maintained   fall prevention program maintained   lighting adjusted   room organization consistent   safety round/check completed  Taken 11/5/2024 0815 by Clara Ortiz RN  Safety Promotion/Fall Prevention:   activity supervised   clutter free environment maintained   fall prevention program maintained   room organization consistent    safety round/check completed     Problem: Comorbidity Management  Goal: Blood Glucose Level Within Target Range  Outcome: Progressing  Intervention: Monitor and Manage Glycemia  Recent Flowsheet Documentation  Taken 11/5/2024 1600 by Clara Ortiz RN  Medication Review/Management: medications reviewed  Taken 11/5/2024 1420 by Clara Ortiz RN  Medication Review/Management: medications reviewed  Taken 11/5/2024 1210 by Clara Ortiz RN  Medication Review/Management: medications reviewed  Taken 11/5/2024 0815 by Clara Ortiz RN  Medication Review/Management: medications reviewed  Goal: Blood Pressure in Desired Range  Outcome: Progressing  Intervention: Maintain Blood Pressure Management  Recent Flowsheet Documentation  Taken 11/5/2024 1600 by Clara Ortiz RN  Medication Review/Management: medications reviewed  Taken 11/5/2024 1420 by Clara Ortiz RN  Medication Review/Management: medications reviewed  Taken 11/5/2024 1210 by Clara Ortiz RN  Medication Review/Management: medications reviewed  Taken 11/5/2024 0815 by Clara Ortiz RN  Medication Review/Management: medications reviewed  Goal: Maintenance of Osteoarthritis Symptom Control  Outcome: Progressing  Intervention: Maintain Osteoarthritis Symptom Control  Recent Flowsheet Documentation  Taken 11/5/2024 1600 by Clara Ortzi RN  Activity Management: (pt stood at bedside and dangled) activity encouraged  Medication Review/Management: medications reviewed  Taken 11/5/2024 1420 by Clara Ortiz RN  Activity Management: bedrest  Medication Review/Management: medications reviewed  Taken 11/5/2024 1210 by Clara Ortiz RN  Activity Management: bedrest  Medication Review/Management: medications reviewed  Taken 11/5/2024 1000 by Clara Ortiz RN  Activity Management: bedrest  Taken 11/5/2024 0815 by Clara Ortiz RN  Activity Management: bedrest  Medication Review/Management:  medications reviewed     Problem: Skin Injury Risk Increased  Goal: Skin Health and Integrity  Outcome: Progressing  Intervention: Optimize Skin Protection  Recent Flowsheet Documentation  Taken 11/5/2024 1600 by Clara Ortiz RN  Activity Management: (pt stood at bedside and dangled) activity encouraged  Head of Bed (HOB) Positioning: HOB at 30 degrees  Taken 11/5/2024 1420 by Clara Ortiz RN  Activity Management: bedrest  Pressure Reduction Techniques: frequent weight shift encouraged  Head of Bed (HOB) Positioning: HOB at 20-30 degrees  Pressure Reduction Devices: pressure-redistributing mattress utilized  Skin Protection: incontinence pads utilized  Taken 11/5/2024 1210 by Clara Ortiz RN  Activity Management: bedrest  Head of Bed (HOB) Positioning: HOB at 20-30 degrees  Taken 11/5/2024 1000 by Clara Ortiz RN  Activity Management: bedrest  Head of Bed (HOB) Positioning: HOB elevated  Taken 11/5/2024 0815 by Clara Ortiz RN  Activity Management: bedrest  Pressure Reduction Techniques: frequent weight shift encouraged  Head of Bed (HOB) Positioning: HOB elevated  Pressure Reduction Devices: pressure-redistributing mattress utilized  Skin Protection: incontinence pads utilized     Problem: Enteral Nutrition  Goal: Absence of Aspiration Signs and Symptoms  Outcome: Progressing  Intervention: Minimize Aspiration Risk  Recent Flowsheet Documentation  Taken 11/5/2024 1600 by Clara Ortiz RN  Head of Bed (HOB) Positioning: HOB at 30 degrees  Taken 11/5/2024 1420 by Clara Ortiz RN  Head of Bed (HOB) Positioning: HOB at 20-30 degrees  Taken 11/5/2024 1210 by Clara Ortiz RN  Head of Bed (HOB) Positioning: HOB at 20-30 degrees  Taken 11/5/2024 1000 by Clara Ortiz RN  Head of Bed (HOB) Positioning: HOB elevated  Taken 11/5/2024 0815 by Clara Ortiz RN  Head of Bed (HOB) Positioning: HOB elevated  Goal: Safe, Effective Therapy  Delivery  Outcome: Progressing  Goal: Feeding Tolerance  Outcome: Progressing     Problem: Restraint, Nonviolent  Goal: Absence of Harm or Injury  Outcome: Progressing  Intervention: Implement Least Restrictive Safety Strategies  Recent Flowsheet Documentation  Taken 11/5/2024 1600 by Clara Ortiz RN  Medical Device Protection: IV pole/bag removed from visual field  Diversional Activities: television  Taken 11/5/2024 1420 by Clara Ortiz RN  Medical Device Protection: IV pole/bag removed from visual field  Diversional Activities: television  Taken 11/5/2024 1400 by Clara Ortiz RN  Medical Device Protection: IV pole/bag removed from visual field  Diversional Activities: television  Taken 11/5/2024 1210 by Clara Ortiz RN  Medical Device Protection: IV pole/bag removed from visual field  Taken 11/5/2024 1154 by Clara Ortiz RN  Medical Device Protection: IV pole/bag removed from visual field  Diversional Activities: television  Taken 11/5/2024 1000 by Clara Ortiz RN  Medical Device Protection: IV pole/bag removed from visual field  Diversional Activities: television  Taken 11/5/2024 0815 by Clara Ortiz RN  Medical Device Protection: IV pole/bag removed from visual field  Diversional Activities: other (see comments)  Taken 11/5/2024 0800 by Clara Ortiz RN  Medical Device Protection: IV pole/bag removed from visual field  Diversional Activities: television  Intervention: Protect Dignity, Rights and Personal Wellbeing  Recent Flowsheet Documentation  Taken 11/5/2024 1420 by Clara Ortiz RN  Trust Relationship/Rapport:   care explained   questions answered   questions encouraged  Taken 11/5/2024 0815 by Clara Ortiz RN  Trust Relationship/Rapport:   care explained   questions answered   questions encouraged  Intervention: Protect Skin and Joint Integrity  Recent Flowsheet Documentation  Taken 11/5/2024 1600 by Clara Ortiz RN  Body  Position: position changed independently  Taken 11/5/2024 1420 by Clara Ortiz RN  Body Position: position changed independently  Skin Protection: incontinence pads utilized  Taken 11/5/2024 1210 by Clara Ortiz RN  Body Position: weight shifting  Taken 11/5/2024 1000 by Clara Ortiz RN  Body Position: weight shifting  Taken 11/5/2024 0815 by Clara Ortiz RN  Body Position: (left side) side-lying  Skin Protection: incontinence pads utilized     Problem: Violence Risk or Actual  Goal: Anger and Impulse Control  Outcome: Progressing  Intervention: Minimize Safety Risk  Recent Flowsheet Documentation  Taken 11/5/2024 1600 by Clara Ortiz RN  De-Escalation Techniques: reoriented  Enhanced Safety Measures: bed alarm set  Taken 11/5/2024 1420 by Clara Ortiz RN  De-Escalation Techniques: reoriented  Enhanced Safety Measures: bed alarm set  Taken 11/5/2024 1400 by Clara Ortiz RN  De-Escalation Techniques: reoriented  Taken 11/5/2024 1210 by Clara Ortiz RN  De-Escalation Techniques: reoriented  Enhanced Safety Measures: bed alarm set  Taken 11/5/2024 1154 by Clara Ortiz RN  De-Escalation Techniques: medication offered  Taken 11/5/2024 1000 by Clara Ortiz RN  De-Escalation Techniques: quiet time facilitated  Enhanced Safety Measures: bed alarm set  Taken 11/5/2024 0815 by Clara Ortiz RN  De-Escalation Techniques: medication administered  Enhanced Safety Measures: bed alarm set  Taken 11/5/2024 0800 by Clara Ortiz RN  De-Escalation Techniques:   medication offered   appropriate behavior reinforced

## 2024-11-05 NOTE — PROGRESS NOTES
Name: Nelson Wang ADMIT: 2024   : 1945  PCP: Domingo Bravo MD    MRN: [id number] LOS: 4 days   AGE/SEX: 79 y.o. male  ROOM: [facility]     Subjective   Subjective   Status post core track placement yesterday, initiated on tube feeds as patient had been n.p.o. for several days and was not cleared for p.o. intake per speech therapy, and daughter agreed for core track.  Was trying to pull out core track once it  was placed, restraints were ordered.  Patient was seen this morning, sitter present at bedside.  Per sitter patient has slept most of the night, started waking up this morning and intermittently speaking/yelling ncoherently.  On tube feeds, currently at 40 cc an hour.      Review of Systems   As above  Objective   Objective   Vital Signs  Temp:  [97.7 °F (36.5 °C)-98.9 °F (37.2 °C)] 98.1 °F (36.7 °C)  Heart Rate:  [74-90] 85  Resp:  [16-18] 18  BP: ()/(36-67) 95/36  SpO2:  [96 %-100 %] 98 %  on  Flow (L/min) (Oxygen Therapy):  [1-2] 1;   Device (Oxygen Therapy): nasal cannula  Body mass index is 24.71 kg/m².  Physical Exam    General: Lying in bed,confused, in restraints, ill-appearing  HEENT: Normocephalic, atraumatic  CV: Regular rate and rhythm, + systolic murmur  Lungs: CTA, no wheezing  Abdomen: Soft, nontender, nondistended  Extremities: No significant peripheral edema , no cyanosis     Results Review     I reviewed the patient's new clinical results.  Results from last 7 days   Lab Units 24  0556 24  03424  0532 24  0534   WBC 10*3/mm3 4.15 6.86 6.03 7.09   HEMOGLOBIN g/dL 8.5* 9.3* 8.4* 8.1*   PLATELETS 10*3/mm3 126* 139* 164 150     Results from last 7 days   Lab Units 24  0556 24  03424  0531 24  0535   SODIUM mmol/L 134* 137 138 131*   POTASSIUM mmol/L 3.4* 3.9 3.6 4.2   CHLORIDE mmol/L 102 103 103 98   CO2 mmol/L 21.4* 21.4* 22.0 18.0*   BUN mg/dL 23 11 13 18   CREATININE mg/dL 5.14* 2.92* 2.93* 3.49*   GLUCOSE mg/dL 161*  147* 360* 89   Estimated Creatinine Clearance: 11.5 mL/min (A) (by C-G formula based on SCr of 5.14 mg/dL (H)).  Results from last 7 days   Lab Units 11/05/24  0556 11/02/24  0535 11/01/24  0443 10/31/24  0802 10/30/24  0436   ALBUMIN g/dL 3.1* 3.0* 2.8* 3.2* 3.3*   BILIRUBIN mg/dL  --   --  0.6  --  0.9   ALK PHOS U/L  --   --  64  --  69   AST (SGOT) U/L  --   --  9  --  8   ALT (SGPT) U/L  --   --  <5  --  <5     Results from last 7 days   Lab Units 11/05/24  0556 11/04/24  0349 11/03/24  0531 11/02/24  0535 11/01/24  0443 10/31/24  0802 10/30/24  0436   CALCIUM mg/dL 8.9 9.3 8.3* 8.7 8.5* 8.9 8.8   ALBUMIN g/dL 3.1*  --   --  3.0* 2.8* 3.2* 3.3*   MAGNESIUM mg/dL  --   --  2.2  --  2.1 1.9 1.9   PHOSPHORUS mg/dL 2.6  --   --  4.2  --  4.0 3.5     Results from last 7 days   Lab Units 11/01/24  0444 10/31/24  0802 10/30/24  0436   PROCALCITONIN ng/mL  --  0.39* 0.31*   LACTATE mmol/L 0.9  --   --      COVID19   Date Value Ref Range Status   10/08/2024 Not Detected Not Detected - Ref. Range Final   09/28/2024 Not Detected Not Detected - Ref. Range Final     Glucose   Date/Time Value Ref Range Status   11/05/2024 1125 160 (H) 70 - 130 mg/dL Final   11/05/2024 0620 159 (H) 70 - 130 mg/dL Final   11/04/2024 2354 142 (H) 70 - 130 mg/dL Final   11/04/2024 1824 80 70 - 130 mg/dL Final   11/04/2024 1139 133 (H) 70 - 130 mg/dL Final   11/04/2024 0543 150 (H) 70 - 130 mg/dL Final   11/03/2024 2359 111 70 - 130 mg/dL Final           Duplex Hemodialysis Access CAR    Patent left arteriovenous fistula with normal volume flows.  Moderate   arterial anastomotic and proximal inflow stenosis with chronic septum   noted.  Otherwise widely patent fistula.  Adult Transthoracic Echo Complete W/ Cont if Necessary Per Protocol    Left ventricular systolic function is normal. Left ventricular ejection   fraction appears to be 56 - 60%.    Left ventricular diastolic function was indeterminate.    The right ventricular cavity is mildly  dilated. Normal right   ventricular systolic function noted.    : The left atrial cavity is mildly dilated.    The aortic valve leaflets are mildly to moderately calcified    There is moderate mitral annular calcification    Insufficient TR velocity profile to estimate the right ventricular   systolic pressure.    There is no evidence of pericardial effusion    Scheduled Medications  acetaminophen, 1,000 mg, Nasogastric, Q8H  amitriptyline, 10 mg, Oral, Nightly  arformoterol, 15 mcg, Nebulization, BID - RT  atorvastatin, 40 mg, Oral, Daily  budesonide, 0.5 mg, Nebulization, BID - RT  [Held by provider] carvedilol, 3.125 mg, Oral, Q12H  Check Fentanyl Patch Placement, 1 each, Does not apply, Q12H  gabapentin, 100 mg, Oral, Nightly  heparin (porcine), 5,000 Units, Subcutaneous, Q8H  insulin regular, 2-7 Units, Subcutaneous, Q6H  levothyroxine, 175 mcg, Nasogastric, Q AM  Lidocaine, 1 patch, Transdermal, Q24H  pantoprazole, 40 mg, Intravenous, BID AC  risperiDONE, 2 mg, Oral, Daily With Breakfast & Dinner  risperiDONE, 2 mg, Oral, Nightly  senna-docusate sodium, 2 tablet, Nasogastric, Daily  sertraline, 100 mg, Oral, Nightly  sodium chloride, 10 mL, Intravenous, Q12H  torsemide, 100 mg, Oral, Daily  Vancomycin Pharmacy Intermittent/Pulse Dosing, , Does not apply, Daily    Infusions  Pharmacy to dose vancomycin,     Diet  NPO Diet NPO Type: Tube Feeding    I have personally reviewed     [x]  Laboratory   []  Microbiology   []  Radiology   []  EKG/Telemetry  []  Cardiology/Vascular   []  Pathology    []  Records       Assessment/Plan     Active Hospital Problems    Diagnosis  POA    **Discitis of lumbar region [M46.46]  Yes    Severe malnutrition [E43]  Yes    Discitis [M46.40]  Yes    Metabolic encephalopathy [G93.41]  Yes    Aspiration pneumonia [J69.0]  Yes    ESRD on dialysis [N18.6, Z99.2]  Not Applicable    Bipolar disorder [F31.9]  Yes    Chronic respiratory failure with hypoxia [J96.11]  Yes    Essential  "hypertension [I10]  Yes    Anemia due to chronic kidney disease [N18.9, D63.1]  Yes    Type 2 diabetes mellitus without complication [E11.9]  Yes      Resolved Hospital Problems   No resolved problems to display.       Coag negative staph acteremia  L1/2 discitis , T5/6 discitis.   -Blood cultures 10/31/2024 positive for coag negative staph  -IV ceftriaxone discontinued, IV vancomycin continued per ID  -MRI of the cervical spine/thoracic/lumbar-imited studies but thought to have L1/2 discitis along with T5/6 discitis.   -TDC catheter removed 11/3/2024 as likely source of infection.  Vascular surgery managing  -Neurosurgery evaluated, no intervention planned, plan for repeat MRI of lumbar and thoracic spine with and without contrast in 3 months.  -Echocardiogram 11/4/24-normal left ventricular function, EF 56 to 60%, no findings of vegetation/infection  ID evaluated and recommended \"vancomycin dosing per pharmacy with dialysis only x 6 weeks.  Antibiotic stop date December 14  Please monitor weekly CBC with differential and vancomycin troughs and fax the results to the infectious disease clinic at [phone]  We will arrange for ID follow-up on December 13\"  -Continue IV vancomycin with dialysis inpatient  -For back pain scheduled low-dose nightly gabapentin 100 mg, high-dose Tylenol 1000 mg every 8 hours, as needed oxycodone 5 mg every 6 hours for moderate pain,   -Hold Dilaudid for now due to encephalopathy  -Scheduled bowel regimen, hold for loose stools         Metabolic encephalopathy  Hallucinations  Bipolar disorder?  -CT head 10/8/2024 with no acute findings  -Psychiatry following.  On as needed Zyprexa.  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  COPD  -CT scan 10/41/24 mild patchy and consolidative airspace opacities in " the right lower lobe with small adjacent pleural effusion new from previous comparison. Differential includes atelectasis versus early developing infection. -  -Continue arformoterol, budesonide nebulizer        ESRD on HD  -has been on home hemodialysis receiving 5 times a week   -Nephrology managing dialysis  -TDC catheter removed 11/3/2024 per vascular as likely source of infection.  Currently on dialysis holiday  -vascular surgery evaluation viability of his left AV fistula.           Gastritis  Abdominal pain  -EGD 10/28/2024 showed gastritis  -IV PPI twice daily     Hypothyroidism  -Continue levothyroxine 175 mcg daily    Essential hypertension  -BP soft.  Hold carvedilol        Diabetes mellitus  -SSI.  Hypoglycemic protocol            Anemia of chronic disease  -Hemoglobin stable around 8.  -Monitor and transfuse as indicated for symptomatic anemia or hemoglobin less than 7      DVT prophylaxis: Subcu heparin  Full code.  Palliative care evaluated.  Disposition: SNF, timing to be determined.  Likely not stable to be discharged until early next week.  Expected Discharge Date: 11/7/2024; Expected Discharge Time:        Copied text in this note has been reviewed and is accurate as of 11/05/24.         Dictated utilizing Dragon dictation        Fela Snyder MD  [facility]  11/05/24  12:04 EST

## 2024-11-05 NOTE — PROGRESS NOTES
The patient is again noted to be quite groggy today but daughter reports that he seemed more awake and alert and improved this morning.  No management problems have been noted.

## 2024-11-05 NOTE — PLAN OF CARE
Goal Outcome Evaluation:      Pt very disoriented, sitter @ bedside, BL soft wrist restraints in place, Afib, BP low one liter Nacl bolus given, currently SL, TF infusing @ goal, limb precautions maintained, will continue to monitor

## 2024-11-05 NOTE — CONSULTS
"Nutrition Services    Patient Name:  Nelson Wang  YOB: 1945  MRN: [id number]  Admit Date:  11/1/2024  Assessment Date:  11/05/24    Comment: Nutrition Consult Follow up  Speech evaluated again and recommends pt to remain NPO. NG cortrak placed with nasal bridle 11/4.  Nursing reports patient at goal with feeds and flushes and tolerating well.  Confirmed at bedside appropriate product in place and confirmed TF and free water flush rates/frequency.  Patient asleep, sitter at bedside.    Patient meets ASPEN/AND criteria for nutrition diagnosis of severe malnutrition of chronic illness based on: NFPE, inadequate po intake, and wt loss.    CLINICAL NUTRITION ASSESSMENT      Reason for Assessment Follow-up Protocol   Diagnosis/Problem  lumbar discitis, AMS, ESRD on hemodialysis   Medical & Surgical Hx Noted   Current Problems Tube feeding started yesterday, currently at goal rate and flushes are in place.     Encounter Information        Nutrition History    Food Preferences    Supplements    Factors Affecting Intake abdominal pain, altered mental status, swallow impairment   Tests/Procedures No new tests/procedures     Anthropometrics        Current Height   Current Weight  BMI kg/m2 Height: 167 cm (65.75\")  Weight: 68.9 kg (151 lb 14.4 oz) (11/05/24 0600)  Body mass index is 24.71 kg/m².     Adj BMI (if applicable)    BMI Category Overweight (25 - 29.9)       Admission Weight    Ideal Body Weight (IBW) 140lb     Adj IBW (if applicable)    Usual Body Weight (UBW) 160-170lb   Weight Change/Trend Loss, Gain             Estimated/Assessed Needs        Energy Requirements    Weight for Calculation 67.3   Method for Estimation  25 kcal/kg   EST Needs (kcal/day) 1675       Protein Requirements    Weight for Calculation 74.3   EST Protein Needs (g/kg) 1.0 - 1.2 gm/kg   EST Daily Needs (g/day) 74-89       Fluid Requirements     Method for Estimation 1 mL/kcal    Estimated Needs (mL/day)      Labs        " Pertinent Labs    Results from last 7 days   Lab Units 11/05/24  0556 11/04/24 0349 11/03/24  0531 11/02/24  0535 11/01/24  0443 10/31/24  0802 10/30/24  0436   SODIUM mmol/L 134* 137 138   < > 133*   < > 130*   POTASSIUM mmol/L 3.4* 3.9 3.6   < > 4.8   < > 4.3   CHLORIDE mmol/L 102 103 103   < > 98   < > 92*   CO2 mmol/L 21.4* 21.4* 22.0   < > 22.6   < > 26.6   BUN mg/dL 23 11 13   < > 32*   < > 34*   CREATININE mg/dL 5.14* 2.92* 2.93*   < > 6.21*   < > 5.82*   CALCIUM mg/dL 8.9 9.3 8.3*   < > 8.5*   < > 8.8   BILIRUBIN mg/dL  --   --   --   --  0.6  --  0.9   ALK PHOS U/L  --   --   --   --  64  --  69   ALT (SGPT) U/L  --   --   --   --  <5  --  <5   AST (SGOT) U/L  --   --   --   --  9  --  8   GLUCOSE mg/dL 161* 147* 360*   < > 108*   < > 159*    < > = values in this interval not displayed.     Results from last 7 days   Lab Units 11/05/24  0556 11/03/24  0532 11/03/24  0531 11/01/24  0444 11/01/24  0443 10/31/24  0802   MAGNESIUM mg/dL  --   --  2.2  --  2.1 1.9   PHOSPHORUS mg/dL 2.6  --   --    < >  --  4.0   HEMOGLOBIN g/dL 8.5*   < >  --    < >  --  8.1*   HEMATOCRIT % 27.4*   < >  --    < >  --  26.8*   WBC 10*3/mm3 4.15   < >  --    < >  --  6.40   ALBUMIN g/dL 3.1*  --   --    < > 2.8* 3.2*    < > = values in this interval not displayed.     Results from last 7 days   Lab Units 11/05/24 0556 11/04/24 0349 11/03/24  0532 11/02/24  0534 11/01/24  0444   PLATELETS 10*3/mm3 126* 139* 164 150 163     COVID19   Date Value Ref Range Status   10/08/2024 Not Detected Not Detected - Ref. Range Final     Lab Results   Component Value Date    HGBA1C 6.90 (H) 10/08/2024          Medications            Scheduled Medications acetaminophen, 1,000 mg, Nasogastric, Q8H  amitriptyline, 10 mg, Oral, Nightly  arformoterol, 15 mcg, Nebulization, BID - RT  atorvastatin, 40 mg, Oral, Daily  budesonide, 0.5 mg, Nebulization, BID - RT  [Held by provider] carvedilol, 3.125 mg, Oral, Q12H  Check Fentanyl Patch Placement, 1  each, Does not apply, Q12H  gabapentin, 100 mg, Oral, Nightly  heparin (porcine), 5,000 Units, Subcutaneous, Q8H  insulin regular, 2-7 Units, Subcutaneous, Q6H  levothyroxine, 175 mcg, Nasogastric, Q AM  Lidocaine, 1 patch, Transdermal, Q24H  pantoprazole, 40 mg, Intravenous, BID AC  risperiDONE, 2 mg, Oral, Daily With Breakfast & Dinner  risperiDONE, 2 mg, Oral, Nightly  senna-docusate sodium, 2 tablet, Nasogastric, Daily  sertraline, 100 mg, Oral, Nightly  sodium chloride, 10 mL, Intravenous, Q12H  torsemide, 100 mg, Oral, Daily  Vancomycin Pharmacy Intermittent/Pulse Dosing, , Does not apply, Daily        Infusions Pharmacy to dose vancomycin,         PRN Medications   [DISCONTINUED] senna-docusate sodium **AND** polyethylene glycol **AND** bisacodyl **AND** bisacodyl    cloNIDine    dextrose    dextrose    famotidine    glucagon (human recombinant)    heparin (porcine)    HYDROmorphone    ipratropium-albuterol    labetalol    melatonin    nitroglycerin    OLANZapine    ondansetron    oxyCODONE    Pharmacy to dose vancomycin    sodium chloride    sodium chloride     Physical Findings          Physical Appearance appeared asleep impulsive   Oral/Mouth Cavity tooth or teeth missing   Edema  no edema   Gastrointestinal fecal incontinence, last bowel movement: 10/29  - No BMx1 week   Skin  bruising   Tubes/Drains/Lines dialysis catheter   NFPE No clinical signs of muscle wasting or fat loss   --  Current Nutrition Orders & Evaluation of Intake       Oral Nutrition     Food Allergies NKFA   Current PO Diet NPO Diet NPO Type: Tube Feeding   Supplement    PO Evaluation     Trending % PO Intake    --  Nutrition Diagnosis        Nutrition Dx Problem 1 Problem: Inadequate Oral Intake  Etiology: Factors Affecting Nutrition - AMS  Signs/Symptoms: SLP/Swallow EvaluationNPO    Comment:      INTERVENTION / PLAN OF CARE  Intervention Goal        Intervention Goal(s) Reduce/improve symptoms, Meet estimated needs, Disease  management/therapy, Maintain TF/PN, Tolerate TF/PN at goal, and No significant weight loss     Nutrition Intervention        RD Action Continue to monitor, Care plan reviewed, and Recommend/order: TF's     Prescription         Diet  NPO per Speech recommendations   Supplement/Snack    EN/PN     Prescription Ordered       Enteral Prescription:     Enteral Route NG    TF Delivery Method Continuous    Enteral Product Novasource Renal    Modular None    Propofol Rate/Kcal     TF Start Rate 20    TF Goal Rate 40    Free Water Flush [id number]    TF Provision at Goal: 1760 kcal, 80 gm protein, 634 mL free water +  water flushes         Calories 100 % needs met         Protein  100 % needs met         Fluid (mL)     Prescription Ordered Yes, No changes at this time     Monitor/Evaluation        Monitor Per protocol, I&O, PO intake, Pertinent labs, EN delivery/tolerance, Weight, GI status, Symptoms, Swallow function   Discharge Plan Pending clinical course   Education Education not appropriate at this time     RD to follow per protocol.       Electronically signed by:  Eleuterio Mae RD  11/05/24 10:06 EST

## 2024-11-06 ENCOUNTER — TELEPHONE (OUTPATIENT)
Dept: GASTROENTEROLOGY | Facility: CLINIC | Age: 79
End: 2024-11-06
Payer: MEDICARE

## 2024-11-06 LAB
ALBUMIN SERPL-MCNC: 2.6 G/DL (ref 3.5–5.2)
ALP SERPL-CCNC: 70 U/L (ref 39–117)
ALT SERPL W P-5'-P-CCNC: <5 U/L (ref 1–41)
ANION GAP SERPL CALCULATED.3IONS-SCNC: 11.7 MMOL/L (ref 5–15)
AST SERPL-CCNC: 19 U/L (ref 1–40)
BASOPHILS # BLD AUTO: 0.01 10*3/MM3 (ref 0–0.2)
BASOPHILS NFR BLD AUTO: 0.2 % (ref 0–1.5)
BILIRUB CONJ SERPL-MCNC: 0.2 MG/DL (ref 0–0.3)
BILIRUB INDIRECT SERPL-MCNC: 0.2 MG/DL
BILIRUB SERPL-MCNC: 0.4 MG/DL (ref 0–1.2)
BUN SERPL-MCNC: 38 MG/DL (ref 8–23)
BUN/CREAT SERPL: 5.1 (ref 7–25)
CALCIUM SPEC-SCNC: 9 MG/DL (ref 8.6–10.5)
CHLORIDE SERPL-SCNC: 102 MMOL/L (ref 98–107)
CO2 SERPL-SCNC: 21.3 MMOL/L (ref 22–29)
CREAT SERPL-MCNC: 7.39 MG/DL (ref 0.76–1.27)
DEPRECATED RDW RBC AUTO: 47.9 FL (ref 37–54)
EGFRCR SERPLBLD CKD-EPI 2021: 7 ML/MIN/1.73
EOSINOPHIL # BLD AUTO: 0.12 10*3/MM3 (ref 0–0.4)
EOSINOPHIL NFR BLD AUTO: 2.1 % (ref 0.3–6.2)
ERYTHROCYTE [DISTWIDTH] IN BLOOD BY AUTOMATED COUNT: 14.2 % (ref 12.3–15.4)
GLUCOSE BLDC GLUCOMTR-MCNC: 115 MG/DL (ref 70–130)
GLUCOSE BLDC GLUCOMTR-MCNC: 139 MG/DL (ref 70–130)
GLUCOSE BLDC GLUCOMTR-MCNC: 141 MG/DL (ref 70–130)
GLUCOSE BLDC GLUCOMTR-MCNC: 164 MG/DL (ref 70–130)
GLUCOSE BLDC GLUCOMTR-MCNC: 233 MG/DL (ref 70–130)
GLUCOSE SERPL-MCNC: 156 MG/DL (ref 65–99)
HCT VFR BLD AUTO: 27.4 % (ref 37.5–51)
HGB BLD-MCNC: 8.4 G/DL (ref 13–17.7)
IMM GRANULOCYTES # BLD AUTO: 0.02 10*3/MM3 (ref 0–0.05)
IMM GRANULOCYTES NFR BLD AUTO: 0.3 % (ref 0–0.5)
LYMPHOCYTES # BLD AUTO: 1.8 10*3/MM3 (ref 0.7–3.1)
LYMPHOCYTES NFR BLD AUTO: 30.9 % (ref 19.6–45.3)
MAGNESIUM SERPL-MCNC: 2.3 MG/DL (ref 1.6–2.4)
MCH RBC QN AUTO: 28.1 PG (ref 26.6–33)
MCHC RBC AUTO-ENTMCNC: 30.7 G/DL (ref 31.5–35.7)
MCV RBC AUTO: 91.6 FL (ref 79–97)
MONOCYTES # BLD AUTO: 0.52 10*3/MM3 (ref 0.1–0.9)
MONOCYTES NFR BLD AUTO: 8.9 % (ref 5–12)
NEUTROPHILS NFR BLD AUTO: 3.35 10*3/MM3 (ref 1.7–7)
NEUTROPHILS NFR BLD AUTO: 57.6 % (ref 42.7–76)
NRBC BLD AUTO-RTO: 0 /100 WBC (ref 0–0.2)
PHOSPHATE SERPL-MCNC: 2.1 MG/DL (ref 2.5–4.5)
PLATELET # BLD AUTO: 138 10*3/MM3 (ref 140–450)
PMV BLD AUTO: 9.8 FL (ref 6–12)
POTASSIUM SERPL-SCNC: 3.5 MMOL/L (ref 3.5–5.2)
PROT SERPL-MCNC: 6.7 G/DL (ref 6–8.5)
RBC # BLD AUTO: 2.99 10*6/MM3 (ref 4.14–5.8)
SODIUM SERPL-SCNC: 135 MMOL/L (ref 136–145)
VANCOMYCIN SERPL-MCNC: 13.5 MCG/ML (ref 5–40)
WBC NRBC COR # BLD AUTO: 5.82 10*3/MM3 (ref 3.4–10.8)

## 2024-11-06 PROCEDURE — 94664 DEMO&/EVAL PT USE INHALER: CPT

## 2024-11-06 PROCEDURE — 80202 ASSAY OF VANCOMYCIN: CPT | Performed by: STUDENT IN AN ORGANIZED HEALTH CARE EDUCATION/TRAINING PROGRAM

## 2024-11-06 PROCEDURE — 85025 COMPLETE CBC W/AUTO DIFF WBC: CPT | Performed by: INTERNAL MEDICINE

## 2024-11-06 PROCEDURE — 25010000002 HEPARIN (PORCINE) PER 1000 UNITS: Performed by: STUDENT IN AN ORGANIZED HEALTH CARE EDUCATION/TRAINING PROGRAM

## 2024-11-06 PROCEDURE — 82948 REAGENT STRIP/BLOOD GLUCOSE: CPT

## 2024-11-06 PROCEDURE — 97110 THERAPEUTIC EXERCISES: CPT

## 2024-11-06 PROCEDURE — 25010000002 VANCOMYCIN 750 MG RECONSTITUTED SOLUTION 1 EACH VIAL: Performed by: STUDENT IN AN ORGANIZED HEALTH CARE EDUCATION/TRAINING PROGRAM

## 2024-11-06 PROCEDURE — 80076 HEPATIC FUNCTION PANEL: CPT | Performed by: STUDENT IN AN ORGANIZED HEALTH CARE EDUCATION/TRAINING PROGRAM

## 2024-11-06 PROCEDURE — 25810000003 SODIUM CHLORIDE 0.9 % SOLUTION 250 ML FLEX CONT: Performed by: STUDENT IN AN ORGANIZED HEALTH CARE EDUCATION/TRAINING PROGRAM

## 2024-11-06 PROCEDURE — 99024 POSTOP FOLLOW-UP VISIT: CPT | Performed by: SURGERY

## 2024-11-06 PROCEDURE — 94799 UNLISTED PULMONARY SVC/PX: CPT

## 2024-11-06 PROCEDURE — 83735 ASSAY OF MAGNESIUM: CPT | Performed by: STUDENT IN AN ORGANIZED HEALTH CARE EDUCATION/TRAINING PROGRAM

## 2024-11-06 PROCEDURE — 80048 BASIC METABOLIC PNL TOTAL CA: CPT | Performed by: INTERNAL MEDICINE

## 2024-11-06 PROCEDURE — 94761 N-INVAS EAR/PLS OXIMETRY MLT: CPT

## 2024-11-06 PROCEDURE — 97530 THERAPEUTIC ACTIVITIES: CPT

## 2024-11-06 PROCEDURE — 84100 ASSAY OF PHOSPHORUS: CPT | Performed by: INTERNAL MEDICINE

## 2024-11-06 PROCEDURE — 63710000001 INSULIN REGULAR HUMAN PER 5 UNITS: Performed by: NURSE PRACTITIONER

## 2024-11-06 PROCEDURE — 92526 ORAL FUNCTION THERAPY: CPT

## 2024-11-06 RX ORDER — ARIPIPRAZOLE 2 MG/1
2 TABLET ORAL 2 TIMES DAILY
Status: DISCONTINUED | OUTPATIENT
Start: 2024-11-06 | End: 2024-11-18 | Stop reason: HOSPADM

## 2024-11-06 RX ORDER — ALBUMIN (HUMAN) 12.5 G/50ML
12.5 SOLUTION INTRAVENOUS AS NEEDED
Status: CANCELLED | OUTPATIENT
Start: 2024-11-06 | End: 2024-11-07

## 2024-11-06 RX ORDER — MIDODRINE HYDROCHLORIDE 5 MG/1
10 TABLET ORAL ONCE
Status: COMPLETED | OUTPATIENT
Start: 2024-11-06 | End: 2024-11-06

## 2024-11-06 RX ORDER — MIDODRINE HYDROCHLORIDE 5 MG/1
10 TABLET ORAL EVERY 8 HOURS
Status: DISCONTINUED | OUTPATIENT
Start: 2024-11-06 | End: 2024-11-06

## 2024-11-06 RX ORDER — MIDODRINE HYDROCHLORIDE 5 MG/1
10 TABLET ORAL
Status: DISCONTINUED | OUTPATIENT
Start: 2024-11-06 | End: 2024-11-09

## 2024-11-06 RX ORDER — MIDODRINE HYDROCHLORIDE 5 MG/1
10 TABLET ORAL
Status: DISCONTINUED | OUTPATIENT
Start: 2024-11-06 | End: 2024-11-06

## 2024-11-06 RX ADMIN — ACETAMINOPHEN 1000 MG: 500 TABLET ORAL at 08:34

## 2024-11-06 RX ADMIN — LIDOCAINE 1 PATCH: 4 PATCH TOPICAL at 08:34

## 2024-11-06 RX ADMIN — PANTOPRAZOLE SODIUM 40 MG: 40 INJECTION, POWDER, FOR SOLUTION INTRAVENOUS at 06:31

## 2024-11-06 RX ADMIN — INSULIN HUMAN 3 UNITS: 100 INJECTION, SOLUTION PARENTERAL at 12:45

## 2024-11-06 RX ADMIN — MIDODRINE HYDROCHLORIDE 5 MG: 5 TABLET ORAL at 05:02

## 2024-11-06 RX ADMIN — BUDESONIDE 0.5 MG: 0.5 INHALANT ORAL at 09:22

## 2024-11-06 RX ADMIN — GABAPENTIN 100 MG: 100 CAPSULE ORAL at 21:10

## 2024-11-06 RX ADMIN — SENNOSIDES AND DOCUSATE SODIUM 2 TABLET: 50; 8.6 TABLET ORAL at 08:34

## 2024-11-06 RX ADMIN — Medication 10 ML: at 11:27

## 2024-11-06 RX ADMIN — HEPARIN SODIUM 5000 UNITS: 5000 INJECTION INTRAVENOUS; SUBCUTANEOUS at 06:31

## 2024-11-06 RX ADMIN — INSULIN HUMAN 2 UNITS: 100 INJECTION, SOLUTION PARENTERAL at 01:01

## 2024-11-06 RX ADMIN — VANCOMYCIN HYDROCHLORIDE 750 MG: 750 INJECTION, POWDER, LYOPHILIZED, FOR SOLUTION INTRAVENOUS at 21:13

## 2024-11-06 RX ADMIN — MIDODRINE HYDROCHLORIDE 10 MG: 5 TABLET ORAL at 17:30

## 2024-11-06 RX ADMIN — ATORVASTATIN CALCIUM 40 MG: 20 TABLET, FILM COATED ORAL at 08:34

## 2024-11-06 RX ADMIN — MIDODRINE HYDROCHLORIDE 10 MG: 5 TABLET ORAL at 13:16

## 2024-11-06 RX ADMIN — SERTRALINE 100 MG: 100 TABLET, FILM COATED ORAL at 21:10

## 2024-11-06 RX ADMIN — ACETAMINOPHEN 1000 MG: 500 TABLET ORAL at 01:01

## 2024-11-06 RX ADMIN — HEPARIN SODIUM 5000 UNITS: 5000 INJECTION INTRAVENOUS; SUBCUTANEOUS at 21:10

## 2024-11-06 RX ADMIN — MIDODRINE HYDROCHLORIDE 10 MG: 5 TABLET ORAL at 08:34

## 2024-11-06 RX ADMIN — HEPARIN SODIUM 5000 UNITS: 5000 INJECTION INTRAVENOUS; SUBCUTANEOUS at 14:38

## 2024-11-06 RX ADMIN — ACETAMINOPHEN 1000 MG: 500 TABLET ORAL at 17:30

## 2024-11-06 RX ADMIN — ARFORMOTEROL TARTRATE 15 MCG: 15 SOLUTION RESPIRATORY (INHALATION) at 09:19

## 2024-11-06 RX ADMIN — ARIPIPRAZOLE 2 MG: 2 TABLET ORAL at 21:10

## 2024-11-06 RX ADMIN — PANTOPRAZOLE SODIUM 40 MG: 40 INJECTION, POWDER, FOR SOLUTION INTRAVENOUS at 17:30

## 2024-11-06 RX ADMIN — LEVOTHYROXINE SODIUM 175 MCG: 175 TABLET ORAL at 06:32

## 2024-11-06 RX ADMIN — MIDODRINE HYDROCHLORIDE 10 MG: 5 TABLET ORAL at 18:22

## 2024-11-06 RX ADMIN — Medication 10 ML: at 21:10

## 2024-11-06 NOTE — PROGRESS NOTES
The patient has been groggy today and he is not communicative though he does appear awake.  Family informs me that the patient was not taking Risperdal at home, but was taking Abilify 2 mg twice daily.  Accordingly I have made this medication change

## 2024-11-06 NOTE — THERAPY TREATMENT NOTE
Patient Name: Nelson Wang  : 1945    MRN: [id number]                              Today's Date: 2024       Admit Date: 2024    Visit Dx: No diagnosis found.  Patient Active Problem List   Diagnosis    Essential hypertension    Bradycardia, sinus    Anemia due to chronic kidney disease    Hypothyroidism    Idiopathic gout    Proteinuria    Psoriasis    Thrombocytopenia    Type 2 diabetes mellitus without complication    CKD (chronic kidney disease), stage IV    Hyperlipidemia    Monoclonal gammopathy of unknown significance (MGUS)    Stage 5 chronic kidney disease    Chronic gout without tophus    Peritoneal dialysis catheter dysfunction    Medicare annual wellness visit, subsequent    Chronic cough    Peritonitis associated with peritoneal dialysis    Recurrent pneumonia    Acute on chronic respiratory failure with hypoxia    End stage renal disease    Aspiration pneumonitis    Sepsis    Type 2 diabetes mellitus, with long-term current use of insulin    GERD without esophagitis    Immunosuppression due to drug therapy    Bipolar disorder    Chronic respiratory failure with hypoxia    Shortness of breath    Abnormal stress test    ESRD on dialysis    Abnormal chest CT    Encounter for screening for malignant neoplasm of colon    History of colon polyps    Family history of colon cancer    Intractable pain    Abdominal pain    ESRD (end stage renal disease) on dialysis    AMS (altered mental status)    Hallucinations    LUQ pain    Discitis    Metabolic encephalopathy    Aspiration pneumonia    Discitis of lumbar region    Severe malnutrition     Past Medical History:   Diagnosis Date    Abnormal stress test     Anemia     IRON INFUSIONS WITH DIALYSIS    Anesthesia     WITH HIP REPLACEMENT DAUGHTER BELIEVES HAD SVT     Ankle pain, right     Anxiety and depression     CKD (chronic kidney disease), stage IV     DIALYSIS [id number] SHILEY IN RIGHT CHEST    Diabetes     Gout     High  cholesterol     History of peritoneal dialysis     HL (hearing loss)     Hyperkalemia     Hypertension     Hypothyroidism     Insomnia     Night terrors     Psoriasis     Psoriasis     Sleep walking     Thrombocytopenia     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    Vitiligo      Past Surgical History:   Procedure Laterality Date    ANKLE SURGERY Right 11/1990    APPENDECTOMY N/A 1954    ARTERIOVENOUS FISTULA/SHUNT SURGERY Left 07/16/2024    Procedure: Creation of left arm arteriovenous fistula;  Surgeon: Moses Mir MD;  Location: [facility] MAIN OR;  Service: Vascular;  Laterality: Left;    BRONCHOSCOPY N/A 03/01/2024    Procedure: BRONCHOSCOPY WITH BAL AND WASHINGS;  Surgeon: Sandra Espinal MD;  Location: [facility] ENDOSCOPY;  Service: Pulmonary;  Laterality: N/A;  PNEUMONIA    BRONCHOSCOPY N/A 08/30/2024    Procedure: BRONCHOSCOPY WITH BALS AND WASHINGS;  Surgeon: Sandra Espinal MD;  Location: [facility] ENDOSCOPY;  Service: Pulmonary;  Laterality: N/A;  MUCOUS PLUGGING    CARDIAC CATHETERIZATION N/A 05/29/2024    Procedure: Left Heart Cath with possible coronary angioplasty;  Surgeon: Stephan Nichols MD;  Location: [facility] CATH INVASIVE LOCATION;  Service: Cardiology;  Laterality: N/A;    COLONOSCOPY N/A 06/2022    [facility]    ENDOSCOPY N/A 10/28/2024    Procedure: ESOPHAGOGASTRODUODENOSCOPY INSERTION OF LIGHTED INSTRUMENT TO VIEW ESOPHAGUS, STOMACH AND SMALL INTESTINE;  Surgeon: Kingsley Peraza MD;  Location: [facility] ENDOSCOPY;  Service: Gastroenterology;  Laterality: N/A;  Gastritis    HIP BIPOLAR REPLACEMENT Right 01/2000    INSERTION HEMODIALYSIS CATHETER Left 04/09/2024    Procedure: HEMODIALYSIS CATHETER INSERTION;  Surgeon: Jose Berry MD;  Location: [facility] OR;  Service: General;  Laterality: Left;    INSERTION HEMODIALYSIS CATHETER N/A 04/12/2024    Procedure: Tunneled hemodialysis catheter insertion;  Surgeon: Enrique Vinson MD;  Location: [facility] OR;  Service: Vascular;   Laterality: N/A;    INSERTION PERITONEAL DIALYSIS CATHETER N/A 03/27/2023    Procedure: LAPAROSCOPIC INSERTION PERITONEAL DIALYSIS CATHETER, LAPAROSCOPIC OMENTOPEXY WITH LYSIS OF ADHESIONS;  Surgeon: Jose eBrry MD;  Location: [facility] MAIN OR;  Service: General;  Laterality: N/A;    INSERTION PERITONEAL DIALYSIS CATHETER Left 07/23/2023    Procedure: REVISION OF PERITONEAL DIALYSIS CATHETER;  Surgeon: Radha Oreilly MD;  Location[facility] MAIN OR;  Service: General;  Laterality: Left;    REMOVAL PERITONEAL DIALYSIS CATHETER N/A 04/09/2024    Procedure: REMOVAL PERITONEAL DIALYSIS CATHETER;  Surgeon: Jose Berry MD;  [facility] MAIN OR;  Service: General;  Laterality: N/A;    RENAL BIOPSY Left 07/15/2022    UPPER GASTROINTESTINAL ENDOSCOPY      WRIST SURGERY      UNSURE WHICH SIDE DAUGHTER REPORTS HAD SEVERE  CUT FROM WINDOW AND THEY HAD TO DO RECONSTRUCTIVE SURGERY WITH VESSELS AND NERVES      General Information       Row Name 11/06/24 1028          Physical Therapy Time and Intention    Document Type therapy note (daily note)  -EB     Mode of Treatment physical therapy  -EB       Row Name 11/06/24 1028          General Information    Patient Profile Reviewed yes  -EB     Existing Precautions/Restrictions fall  -EB       Row Name 11/06/24 1028          Cognition    Orientation Status (Cognition) oriented to;person  -EB       Row Name 11/06/24 1028          Safety Issues/Impairments Affecting Functional Mobility    Safety Issues Affecting Function (Mobility) safety precaution awareness;insight into deficits/self-awareness;judgment;problem-solving  -EB     Impairments Affecting Function (Mobility) balance;endurance/activity tolerance;pain;cognition;strength  -EB               User Key  (r) = Recorded By, (t) = Taken By, (c) = Cosigned By      Initials Name Provider Type    EB Gema Crespo PTA Physical Therapist Assistant                   Mobility       Row Name 11/06/24 1023           Bed Mobility    Supine-Sit [place] (Bed Mobility) moderate assist (50% patient effort);2 person assist;verbal cues;nonverbal cues (demo/gesture)  -EB     Sit-Supine [place] (Bed Mobility) moderate assist (50% patient effort);2 person assist;nonverbal cues (demo/gesture);verbal cues  -EB     Assistive Device (Bed Mobility) bed rails;head of bed elevated  -EB     Comment, (Bed Mobility) sequencing cues needed and cues for initiation.  -EB       Row Name 11/06/24 1029          Sit-Stand Transfer    Sit-Stand [place] (Transfers) moderate assist (50% patient effort);2 person assist;verbal cues;nonverbal cues (demo/gesture)  -EB     Assistive Device (Sit-Stand Transfers) --  HHAX2  -EB     Comment, (Sit-Stand Transfer) 2 stands from EOB, initially decreases knee extension and forward flexed but able to correct with cueing.  -EB               User Key  (r) = Recorded By, (t) = Taken By, (c) = Cosigned By      Initials Name Provider Type    Gema Garcia PTA Physical Therapist Assistant                   Obj/Interventions       Row Name 11/06/24 1034          Motor Skills    Therapeutic Exercise --  BLE: LAQs and seated marches (X5)  -       Row Name 11/06/24 1034          Balance    Balance Assessment standing static balance;sitting static balance;sitting dynamic balance  -EB     Static Sitting Balance standby assist  -EB     Dynamic Sitting Balance contact guard  -EB     Position, Sitting Balance unsupported;sitting edge of bed  -EB     Static Standing Balance minimal assist;2-person assist;verbal cues;non-verbal cues (demo/gesture)  -EB     Position/Device Used, Standing Balance supported  -EB     Comment, Balance stood for about 10 seconds with each stand. unable to take side steps to HOB.  -EB               User Key  (r) = Recorded By, (t) = Taken By, (c) = Cosigned By      Initials Name Provider Type    Gema Garcia PTA Physical Therapist Assistant                   Goals/Plan    No  documentation.                  Clinical Impression       Row Name 11/06/24 1036          Pain    Pretreatment Pain Rating 0/10 - no pain  -EB     Posttreatment Pain Rating 0/10 - no pain  -EB       Row Name 11/06/24 1036          Plan of Care Review    Plan of Care Reviewed With patient  -EB     Progress improving  -EB     Outcome Evaluation Pt seen for PT tx today. Pt is [place] but able to follow commands. Pt required ModAX2 with bed mobility with sequencing cues provided and cues for initiation. Pt able to sit EOB with SBA/CGA and  complete ilia LE exercises. Pt stood at EOB with ModAX2, HHAX2. Pt initially with flexed posutre but able to correct with verbal cues briefly. Pt stood for about 10 seconds fully upright with each stand. Pt will need rehab at d/c. Per daughter pt was independent Prior to admission. Pt's daughter is wanting IRF at [facility].  Will continue to follow and progress pt as able.  -EB       Row Name 11/06/24 1036          Therapy Assessment/Plan (PT)    Therapy Frequency (PT) 5 times/wk  -EB       Row Name 11/06/24 1036          Positioning and Restraints    Pre-Treatment Position in bed  -EB     Post Treatment Position bed  -EB     In Bed supine;call light within reach;exit alarm on;encouraged to call for assist  -EB               User Key  (r) = Recorded By, (t) = Taken By, (c) = Cosigned By      Initials Name Provider Type    Gema Garcia PTA Physical Therapist Assistant                   Outcome Measures       Row Name 11/06/24 1042          How much help from another person do you currently need...    Turning from your back to your side while in flat bed without using bedrails? 2  -EB     Moving from lying on back to sitting on the side of a flat bed without bedrails? 2  -EB     Moving to and from a bed to a chair (including a wheelchair)? 2  -EB     Standing up from a chair using your arms (e.g., wheelchair, bedside chair)? 2  -EB     Climbing 3-5 steps with a railing? 1  -EB      To walk in hospital room? 1  -EB     AM-PAC 6 Clicks Score (PT) 10  -EB               User Key  (r) = Recorded By, (t) = Taken By, (c) = Cosigned By      Initials Name Provider Type    EB eGma Crespo PTA Physical Therapist Assistant                                 Physical Therapy Education       Title: PT OT SLP Therapies (In Progress)       Topic: Physical Therapy (In Progress)       Point: Mobility training (Done)       Learning Progress Summary            Patient Acceptance, E,D, VU,NR by EB at 11/6/2024 1042    Acceptance, E,TB, NR by MS at 11/5/2024 1538    Acceptance, E, NL,NR by KT at 11/4/2024 1144                      Point: Home exercise program (Done)       Learning Progress Summary            Patient Acceptance, E,D, VU,NR by EB at 11/6/2024 1042    Acceptance, E,TB, NR by MS at 11/5/2024 1538    Acceptance, E, NL,NR by KT at 11/4/2024 1144                      Point: Body mechanics (Done)       Learning Progress Summary            Patient Acceptance, E,D, VU,NR by EB at 11/6/2024 1042    Acceptance, E,TB, NR by MS at 11/5/2024 1538    Acceptance, E, NL,NR by KT at 11/4/2024 1144                      Point: Precautions (In Progress)       Learning Progress Summary            Patient Acceptance, E,TB, NR by MS at 11/5/2024 1538    Acceptance, E, NL,NR by KT at 11/4/2024 1144                                      User Key       Initials Effective Dates Name Provider Type Discipline    MS 06/16/21 -  Twila Puente PT Physical Therapist PT    EB 02/14/23 -  Gema Crespo PTA Physical Therapist Assistant PT    KT 09/04/24 -  Clara Ortiz RN Registered Nurse Nurse                  PT Recommendation and Plan     Progress: improving  Outcome Evaluation: Pt seen for PT tx today. Pt is [place] but able to follow commands. Pt required ModAX2 with bed mobility with sequencing cues provided and cues for initiation. Pt able to sit EOB with SBA/CGA and  complete ilia LE exercises. Pt stood at EOB with  ModAX2, HHAX2. Pt initially with flexed posutre but able to correct with verbal cues briefly. Pt stood for about 10 seconds fully upright with each stand. Pt will need rehab at d/c. Per daughter pt was independent Prior to admission. Pt's daughter is wanting IRF at [facility].  Will continue to follow and progress pt as able.     Time Calculation:         PT Charges       Row Name 11/06/24 1042             Time Calculation    Start Time 0856  -EB      Stop Time 0920  -EB      Time Calculation (min) 24 min  -EB      PT Received On 11/06/24  -EB      PT - Next Appointment 11/07/24  -EB         Time Calculation- PT    Total Timed Code Minutes- PT 24 minute(s)  -EB                User Key  (r) = Recorded By, (t) = Taken By, (c) = Cosigned By      Initials Name Provider Type    EB Gema Crespo PTA Physical Therapist Assistant                  Therapy Charges for Today       Code Description Service Date Service Provider Modifiers Qty    99635310000 HC PT THER PROC EA 15 MIN 11/6/2024 Gema Crespo, GREER GP 1    74631036996 HC PT THERAPEUTIC ACT EA 15 MIN 11/6/2024 Gema Crespo, GREER GP 1    92948785314 HC PT THER SUPP EA 15 MIN 11/6/2024 Gema Crespo, GREER GP 1            PT G-Codes  AM-PAC 6 Clicks Score (PT): 10       Gema Crespo PTA  11/6/2024

## 2024-11-06 NOTE — PLAN OF CARE
Goal Outcome Evaluation:  Plan of Care Reviewed With: patient, child           Outcome Evaluation: Patient seen for clinical re evaluation of swallowing. Of note, patient with VFSS 2/2024 which revealed silent aspiration thins. Oriented to name. Generalized weakness noted. Required physical cues to feed self. Throat clearing before the swallow with ice. Intermittent throat clear with nectar via cup and strained mech soft. Mild lingual residue post swallow with soft solids. No overt s/s of aspiration with honey via cup/straw or puree. SLP recs honey and puree, straws okay. Meds crushed in puree or via cortrak. Daughter (over the phone) requested continuing tube feedings d/t poor nutrition prior to placement. Will likely repeat VFSS prior to D/C.

## 2024-11-06 NOTE — PLAN OF CARE
Goal Outcome Evaluation:  Plan of Care Reviewed With: patient        Progress: improving  Outcome Evaluation: SR, RA, A/Ox4. TF infusing at goal rate via Coretrack. d/c Risperdal, Abilify added per MD orders. f/u swallow eval completed during shift. Pureed, feed assist, HTL per SLP recommendation. HD ordered, to be completed. Discharge planning to be evaluated pending medical clearance.

## 2024-11-06 NOTE — PLAN OF CARE
Goal Outcome Evaluation:  Plan of Care Reviewed With: patient        Progress: improving  Outcome Evaluation: Pt seen for PT tx today. Pt is [place] but able to follow commands. Pt required ModAX2 with bed mobility with sequencing cues provided and cues for initiation. Pt able to sit EOB with SBA/CGA and  complete ilia LE exercises. Pt stood at EOB with ModAX2, HHAX2. Pt initially with flexed posutre but able to correct with verbal cues briefly. Pt stood for about 10 seconds fully upright with each stand. Pt will need rehab at d/c. Per daughter pt was independent Prior to admission. Pt's daughter is wanting IRF at [facility].  Will continue to follow and progress pt as able.

## 2024-11-06 NOTE — PROGRESS NOTES
"[facility] Clinical Pharmacy Services: Vancomycin Monitoring Note    Nelson Wang is a 79 y.o. male who is on day 6 of pharmacy to dose vancomycin for Bone and/or Joint Infection.  Stop date 12/13/24  Previous Vancomycin Dose:   intermittent dosing  Updated Cultures and Sensitivities: BC: NG x 3 days  Results from last 7 days   Lab Units 11/06/24  0527 11/03/24  0823 11/01/24  0443   VANCOMYCIN RM mcg/mL 13.50 13.20 15.10     Vitals/Labs  Ht: 167 cm (65.75\"); Wt: 68.9 kg (151 lb 14.4 oz)   Temp Readings from Last 1 Encounters:   11/06/24 97.7 °F (36.5 °C) (Oral)     Estimated Creatinine Clearance: 8 mL/min (A) (by C-G formula based on SCr of 7.39 mg/dL (H)).   Dialysis; assessing for need daily (5x/week at home)    Results from last 7 days   Lab Units 11/06/24 0527 11/05/24  0556 11/04/24  0349   CREATININE mg/dL 7.39* 5.14* 2.92*   WBC 10*3/mm3 5.82 4.15 6.86     Assessment/Plan    Vancomycin Dose: 750 mg IV x1 today after HD   Next Level Date and Time: Vanc Random on 11/7/24 at 0600  We will continue to monitor patient changes and renal function     Thank you for involving pharmacy in this patient's care. Please contact pharmacy with any questions or concerns.       Stan Wesley [facility]  Clinical Pharmacist         "

## 2024-11-06 NOTE — PROGRESS NOTES
[facility]   Surgical Progress Note    Patient Name: Nelson Wang  : 1945  MRN: [id number]  Date of admission: 2024  Surgical Procedures Since Admission:    Subjective   Subjective     Chief Complaint: Postdialysis catheter removal follow-up.    History of Present Illness   Resting comfortably.  Some agitation remains.    Review of Systems    Objective   Objective     Vitals:   Temp:  [97.2 °F (36.2 °C)-98.9 °F (37.2 °C)] 97.7 °F (36.5 °C)  Heart Rate:  [65-80] 80  Resp:  [16-18] 16  BP: ()/(31-68) 89/47  Flow (L/min) (Oxygen Therapy):  [1-3] 3    Physical Exam  Constitutional:       Appearance: He is well-developed.   Pulmonary:      Effort: Pulmonary effort is normal. No respiratory distress.   Abdominal:      General: There is no distension.      Palpations: Abdomen is soft.   Neurological:      Mental Status: He is alert and oriented to person, place, and time.           Result Review    Result Review:  I have personally reviewed the results from the time of this admission to 2024 15:08 EST and agree with these findings:  [x]  Laboratory list / accordion  []  Microbiology  []  Radiology  []  EKG/Telemetry   []  Cardiology/Vascular   []  Pathology  []  Old records  []  Other:  Most notable findings include: Potassium 3.9 creatinine 2.9 white count 6.8  Blood cultures taken at 11/3/2024, no growth times 24 hours x 2.    Assessment & Plan   Assessment / Plan     Brief Patient Summary:  Nelson Wang is a 79 y.o. male who likely infected tunneled catheter with discitis.    Active Hospital Problems:   Active Hospital Problems    Diagnosis     **Discitis of lumbar region     Severe malnutrition     Discitis     Metabolic encephalopathy     Aspiration pneumonia     ESRD on dialysis     Bipolar disorder     Chronic respiratory failure with hypoxia     Essential hypertension     Anemia due to chronic kidney disease     Type 2 diabetes mellitus without complication      Plan:   11/3/2024:  Dialysis catheter removed following dialysis.    11/4/2024: No urgent need for dialysis.  I have ordered a fistula duplex of the left arm fistula.    11/5/2024: Fistula duplex reviewed.  Fistula based upon duplex criteria appears mature and able to be utilized.  Case discussed with nephrology this morning.  Concerns are for his agitation would prohibit him from allowing access of his left arm fistula.  After discussion with nephrology will allow attempted cannulization.  If this fails due to technical issues or agitation related issues would require tunneled catheter later this week.    11/6/2024: Will follow-up tomorrow and see if patient was able to tolerate dialysis through left arm fistula.      VTE Prophylaxis:  Pharmacologic & mechanical VTE prophylaxis orders are present.        Marquez Zaman MD

## 2024-11-06 NOTE — TELEPHONE ENCOUNTER
----- Message from Ish Lopez sent at 10/31/2024  2:32 PM EDT -----  Patient had EGD with Dr. Peraza on 10/28/2024  Findings included-  Normal esophagus.  Gastritits.  Normal duodenum.    Path- Chronic inactive gastritis and focal intestinal metaplasia  Negative for H.Pylori, dysplasia, and malignancy     Repeat EGD in 1 year for 4 quad biopsies for intestinal metaplasia-Cervantes's esophagus

## 2024-11-06 NOTE — PLAN OF CARE
Goal Outcome Evaluation:      Pt disoriented, BL soft wrist restraints in place, NPO, TF infusing @ goal, limb precautions maintained, family @ bedside, HD later today, BP soft midodrine increased to 10mg, Q2 turn, will continue to monitor

## 2024-11-06 NOTE — PROGRESS NOTES
[facility] Progress Note      Patient Name: Nelson Wang  : 1945  MRN: [id number]  Primary Care Physician:  Domingo Bravo MD  Date of admission: 2024    Subjective     Interval History:   F/u ESRD     The patient is more awake, got core track and getting tube feed now, he is hard of hearing, and remains confused  Review of Systems:   Not obtainable    Objective     Vitals:   Temp:  [97.2 °F (36.2 °C)-98.9 °F (37.2 °C)] 97.7 °F (36.5 °C)  Heart Rate:  [65-85] 70  Resp:  [16-18] 16  BP: ()/(31-68) 97/42  Flow (L/min) (Oxygen Therapy):  [1-3] 3    Intake/Output Summary (Last 24 hours) at 2024 0958  Last data filed at 2024 0400  Gross per 24 hour   Intake 1124 ml   Output --   Net 1124 ml       Physical Exam:    General Appearance: Chronically ill, awake with eyes open but not answering question,   HEENT: Core track in place  Neck: No JVD  Lungs: Scattered rhonchi, unlabored breathing  Heart: RRR, no rub  Abdomen: soft, nontender, nondistended  Extremities: no edema, LUE AVF with thrill and bruit    Scheduled Meds:     acetaminophen, 1,000 mg, Nasogastric, Q8H  amitriptyline, 10 mg, Oral, Nightly  arformoterol, 15 mcg, Nebulization, BID - RT  atorvastatin, 40 mg, Oral, Daily  budesonide, 0.5 mg, Nebulization, BID - RT  [Held by provider] carvedilol, 3.125 mg, Oral, Q12H  Check Fentanyl Patch Placement, 1 each, Does not apply, Q12H  gabapentin, 100 mg, Oral, Nightly  heparin (porcine), 5,000 Units, Subcutaneous, Q8H  insulin regular, 2-7 Units, Subcutaneous, Q6H  levothyroxine, 175 mcg, Nasogastric, Q AM  Lidocaine, 1 patch, Transdermal, Q24H  midodrine, 10 mg, Nasogastric, Q8H  pantoprazole, 40 mg, Intravenous, BID AC  risperiDONE, 2 mg, Oral, Daily With Breakfast & Dinner  risperiDONE, 2 mg, Oral, Nightly  senna-docusate sodium, 2 tablet, Nasogastric, Daily  sertraline, 100 mg, Oral, Nightly  sodium chloride, 10 mL, Intravenous, Q12H  torsemide, 100 mg,  Oral, Daily  Vancomycin Pharmacy Intermittent/Pulse Dosing, , Does not apply, Daily      IV Meds:   Pharmacy to dose vancomycin,         Results Reviewed:   I have personally reviewed the results from the time of this admission to 11/6/2024 09:58 EST     Results from last 7 days   Lab Units 11/06/24 0527 11/05/24 0556 11/04/24 0349 11/02/24 0535 11/01/24 0443   SODIUM mmol/L 135* 134* 137   < > 133*   POTASSIUM mmol/L 3.5 3.4* 3.9   < > 4.8   CHLORIDE mmol/L 102 102 103   < > 98   CO2 mmol/L 21.3* 21.4* 21.4*   < > 22.6   BUN mg/dL 38* 23 11   < > 32*   CREATININE mg/dL 7.39* 5.14* 2.92*   < > 6.21*   CALCIUM mg/dL 9.0 8.9 9.3   < > 8.5*   BILIRUBIN mg/dL 0.4  --   --   --  0.6   ALK PHOS U/L 70  --   --   --  64   ALT (SGPT) U/L <5  --   --   --  <5   AST (SGOT) U/L 19  --   --   --  9   GLUCOSE mg/dL 156* 161* 147*   < > 108*    < > = values in this interval not displayed.     Estimated Creatinine Clearance: 8 mL/min (A) (by C-G formula based on SCr of 7.39 mg/dL (H)).  Results from last 7 days   Lab Units 11/06/24 0527 11/05/24 0556 11/03/24 0531 11/02/24 0535 11/01/24 0443   MAGNESIUM mg/dL 2.3  --  2.2  --  2.1   PHOSPHORUS mg/dL 2.1* 2.6  --  4.2  --          Results from last 7 days   Lab Units 11/06/24 0527 11/05/24 0556 11/04/24 0349 11/03/24  0532 11/02/24  0534   WBC 10*3/mm3 5.82 4.15 6.86 6.03 7.09   HEMOGLOBIN g/dL 8.4* 8.5* 9.3* 8.4* 8.1*   PLATELETS 10*3/mm3 138* 126* 139* 164 150           Assessment / Plan     ASSESSMENT:  End-stage renal disease - been on home hemodialysis receiving 5 times a week (Dr Castro follows), via [facility] TDC due to LUE AVF dysfunction (placed in July).  Catheter was removed, he was evaluated for the patency and it was recommended that we will use it before considering a TDC.    Anemia of chronic kidney disease on long-acting RONALD his hemoglobin today is 8.4, platelet 138,000 slightly decreased  Hypertension with chronic kidney disease controlled.  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  Discussed the case with the patient's daughter at the bedside also I discussed the case with Dr. Smith  Copied text in this note has been reviewed and is accurate as of 11/06/24.           Bart Wolf MD  11/06/24  09:58 EST    [facility]  [phone]

## 2024-11-06 NOTE — THERAPY RE-EVALUATION
Acute Care - Speech Language Pathology   Swallow Re-Evaluation [facility]     Patient Name: Nelson Wang  : 1945  MRN: [id number]  Today's Date: 2024               Admit Date: 2024    Visit Dx:   No diagnosis found.  Patient Active Problem List   Diagnosis    Essential hypertension    Bradycardia, sinus    Anemia due to chronic kidney disease    Hypothyroidism    Idiopathic gout    Proteinuria    Psoriasis    Thrombocytopenia    Type 2 diabetes mellitus without complication    CKD (chronic kidney disease), stage IV    Hyperlipidemia    Monoclonal gammopathy of unknown significance (MGUS)    Stage 5 chronic kidney disease    Chronic gout without tophus    Peritoneal dialysis catheter dysfunction    Medicare annual wellness visit, subsequent    Chronic cough    Peritonitis associated with peritoneal dialysis    Recurrent pneumonia    Acute on chronic respiratory failure with hypoxia    End stage renal disease    Aspiration pneumonitis    Sepsis    Type 2 diabetes mellitus, with long-term current use of insulin    GERD without esophagitis    Immunosuppression due to drug therapy    Bipolar disorder    Chronic respiratory failure with hypoxia    Shortness of breath    Abnormal stress test    ESRD on dialysis    Abnormal chest CT    Encounter for screening for malignant neoplasm of colon    History of colon polyps    Family history of colon cancer    Intractable pain    Abdominal pain    ESRD (end stage renal disease) on dialysis    AMS (altered mental status)    Hallucinations    LUQ pain    Discitis    Metabolic encephalopathy    Aspiration pneumonia    Discitis of lumbar region    Severe malnutrition     Past Medical History:   Diagnosis Date    Abnormal stress test     Anemia     IRON INFUSIONS WITH DIALYSIS    Anesthesia     WITH HIP REPLACEMENT DAUGHTER BELIEVES HAD SVT     Ankle pain, right     Anxiety and depression     CKD (chronic kidney disease), stage IV     DIALYSIS  [id number] SHILEY IN RIGHT CHEST    Diabetes     Gout     High cholesterol     History of peritoneal dialysis     HL (hearing loss)     Hyperkalemia     Hypertension     Hypothyroidism     Insomnia     Night terrors     Psoriasis     Psoriasis     Sleep walking     Thrombocytopenia     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    Vitiligo      Past Surgical History:   Procedure Laterality Date    ANKLE SURGERY Right 11/1990    APPENDECTOMY N/A 1954    ARTERIOVENOUS FISTULA/SHUNT SURGERY Left 07/16/2024    Procedure: Creation of left arm arteriovenous fistula;  Surgeon: Moses Mir MD;  Location: [facility] MAIN OR;  Service: Vascular;  Laterality: Left;    BRONCHOSCOPY N/A 03/01/2024    Procedure: BRONCHOSCOPY WITH BAL AND WASHINGS;  Surgeon: Sandra Espinal MD;  Location: [facility] ENDOSCOPY;  Service: Pulmonary;  Laterality: N/A;  PNEUMONIA    BRONCHOSCOPY N/A 08/30/2024    Procedure: BRONCHOSCOPY WITH BALS AND WASHINGS;  Surgeon: Sandra Espinal MD;  Location: [facility] ENDOSCOPY;  Service: Pulmonary;  Laterality: N/A;  MUCOUS PLUGGING    CARDIAC CATHETERIZATION N/A 05/29/2024    Procedure: Left Heart Cath with possible coronary angioplasty;  Surgeon: Stephan Nichols MD;  Location: [facility] CATH INVASIVE LOCATION;  Service: Cardiology;  Laterality: N/A;    COLONOSCOPY N/A 06/2022    [facility]    ENDOSCOPY N/A 10/28/2024    Procedure: ESOPHAGOGASTRODUODENOSCOPY INSERTION OF LIGHTED INSTRUMENT TO VIEW ESOPHAGUS, STOMACH AND SMALL INTESTINE;  Surgeon: Kingslye Peraza MD;  Location: [facility] ENDOSCOPY;  Service: Gastroenterology;  Laterality: N/A;  Gastritis    HIP BIPOLAR REPLACEMENT Right 01/2000    INSERTION HEMODIALYSIS CATHETER Left 04/09/2024    Procedure: HEMODIALYSIS CATHETER INSERTION;  Surgeon: Jose Berry MD;  Location: [facility] OR;  Service: General;  Laterality: Left;    INSERTION HEMODIALYSIS CATHETER N/A 04/12/2024    Procedure: Tunneled hemodialysis catheter insertion;   Surgeon: Enrique Vinson MD;  Location:  [facility] MAIN OR;  Service: Vascular;  Laterality: N/A;    INSERTION PERITONEAL DIALYSIS CATHETER N/A 03/27/2023    Procedure: LAPAROSCOPIC INSERTION PERITONEAL DIALYSIS CATHETER, LAPAROSCOPIC OMENTOPEXY WITH LYSIS OF ADHESIONS;  Surgeon: Jose Berry MD;  Location:  [facility] MAIN OR;  Service: General;  Laterality: N/A;    INSERTION PERITONEAL DIALYSIS CATHETER Left 07/23/2023    Procedure: REVISION OF PERITONEAL DIALYSIS CATHETER;  Surgeon: Radha Oreilly MD;  Location:  [facility] MAIN OR;  Service: General;  Laterality: Left;    REMOVAL PERITONEAL DIALYSIS CATHETER N/A 04/09/2024    Procedure: REMOVAL PERITONEAL DIALYSIS CATHETER;  Surgeon: Jose Berry MD;  Location:  [facility] MAIN OR;  Service: General;  Laterality: N/A;    RENAL BIOPSY Left 07/15/2022    UPPER GASTROINTESTINAL ENDOSCOPY      WRIST SURGERY      UNSURE WHICH SIDE DAUGHTER REPORTS HAD SEVERE  CUT FROM WINDOW AND THEY HAD TO DO RECONSTRUCTIVE SURGERY WITH VESSELS AND NERVES       SLP Recommendation and Plan                                                                               Outcome Evaluation: Patient seen for clinical re evaluation of swallowing. Of note, patient with VFSS 2/2024 which revealed silent aspiration thins. Oriented to name. Generalized weakness noted. Required physical cues to feed self. Throat clearing before the swallow with ice. Intermittent throat clear with nectar via cup and strained mech soft. Mild lingual residue post swallow with soft solids. No overt s/s of aspiration with honey via cup/straw or puree. SLP recs honey and puree, straws okay. Meds crushed in puree or via cortrak. Daughter (over the phone) requested continuing tube feedings d/t poor nutrition prior to placement. Will likely repeat VFSS prior to D/C.      SWALLOW EVALUATION (Last 72 Hours)       SLP Adult Swallow Evaluation       Row Name 11/06/24 1500       Rehab Evaluation    Document Type  re-evaluation  -    Subjective Information --    Patient Observations --    Patient/Family/Caregiver Comments/Observations Daughter in person, and second daughter over the phone  -    Patient Effort adequate  -       General Information    Patient Profile Reviewed yes  -    Additional Documentation Pain Scale: FACES Pre/Post-Treatment (Group)  -       Pain Scale: FACES Pre/Post-Treatment    Pain: FACES Scale, Pretreatment 2-->hurts little bit  -    Posttreatment Pain Rating 2-->hurts little bit  -    Pre/Posttreatment Pain Comment Trying to move around in bed, despite repositioning.  -       Oral Motor Structure and Function              User Key  (r) = Recorded By, (t) = Taken By, (c) = Cosigned By      Initials Name Effective Dates    OC Lesley Pringle, ADILSON 08/28/23 -      Dana Cueto SLP 01/05/24 -                     EDUCATION  The patient has been educated in the following areas:   Dysphagia (Swallowing Impairment).        SLP GOALS       Row Name 11/06/24 1500             (STG) Patient will tolerate trials of    Consistencies Trialed (Tolerate trials) pureed textures;honey/ moderately thick liquids  -      Desired Outcome (Tolerate trials) without signs/symptoms of aspiration  -      [place] (Tolerate trials) independently (over 90% accuracy)  -      Comment (Tolerate trials) Patient seen for clinical re evaluation of swallowing. Of note, patient with VFSS 2/2024 which revealed silent aspiration thins. Oriented to name. Generalized weakness noted. Required physical cues to feed self. Throat clearing before the swallow with ice. Intermittent throat clear with nectar via cup and strained mech soft. Mild lingual residue post swallow with soft solids. No overt s/s of aspiration with honey via cup/straw or puree. SLP recs honey and puree, straws okay. Meds crushed in puree or via cortrak. Assist with meals. Feed only when alert. Daughter (over the phone) requested continuing tube  feedings d/t poor nutrition prior to placement. Will likely repeat VFSS prior to D/C. Of note, order for OP VFSS already ordered prior to hospitalization.  -                User Key  (r) = Recorded By, (t) = Taken By, (c) = Cosigned By      Initials Name Provider Type    Dana Watson SLP Speech and Language Pathologist                         Time Calculation:    Time Calculation- SLP       Row Name 11/06/24 1549             Time Calculation- SLP    SLP Start Time 1230  -      SLP Received On 11/06/24  -                User Key  (r) = Recorded By, (t) = Taken By, (c) = Cosigned By      Initials Name Provider Type    Dana Watson SLP Speech and Language Pathologist                    Therapy Charges for Today       Code Description Service Date Service Provider Modifiers Qty    [id number] HC ST TREATMENT SWALLOW 5 11/6/2024 Dana Cueto SLP GN 1                 ADILSON Solis  11/6/2024

## 2024-11-06 NOTE — PROGRESS NOTES
Name: Nelson Wang ADMIT: 2024   : 1945  PCP: Domingo Bravo MD    MRN: [id number] LOS: 5 days   AGE/SEX: 79 y.o. male  ROOM: [facility]     Subjective   Subjective   Patient seen this morning.  Daughter present at bedside.  Daughter reports he has been speaking some of her, however this morning remains confused, does not answer questions.  Restraints have been removed this morning.  Blood pressure low despite being on midodrine.    Review of Systems   As above  Objective   Objective   Vital Signs  Temp:  [97.2 °F (36.2 °C)-98.9 °F (37.2 °C)] 97.7 °F (36.5 °C)  Heart Rate:  [65-85] 70  Resp:  [16-18] 16  BP: ()/(31-68) 97/42  SpO2:  [94 %-100 %] 100 %  on  Flow (L/min) (Oxygen Therapy):  [1-3] 3;   Device (Oxygen Therapy): nasal cannula  Body mass index is 24.71 kg/m².  Physical Exam    General: Lying in bed,confused,  ill-appearing  HEENT: Normocephalic, atraumatic  CV: Regular rate and rhythm, + systolic murmur  Lungs: CTA, no wheezing  Abdomen: Soft, nontender, nondistended  Extremities: No significant peripheral edema , no cyanosis     Results Review     I reviewed the patient's new clinical results.  Results from last 7 days   Lab Units 2456 24  0532   WBC 10*3/mm3 5.82 4.15 6.86 6.03   HEMOGLOBIN g/dL 8.4* 8.5* 9.3* 8.4*   PLATELETS 10*3/mm3 138* 126* 139* 164     Results from last 7 days   Lab Units 24  0556 24  0531   SODIUM mmol/L 135* 134* 137 138   POTASSIUM mmol/L 3.5 3.4* 3.9 3.6   CHLORIDE mmol/L 102 102 103 103   CO2 mmol/L 21.3* 21.4* 21.4* 22.0   BUN mg/dL 38* 23 11 13   CREATININE mg/dL 7.39* 5.14* 2.92* 2.93*   GLUCOSE mg/dL 156* 161* 147* 360*   Estimated Creatinine Clearance: 8 mL/min (A) (by C-G formula based on SCr of 7.39 mg/dL (H)).  Results from last 7 days   Lab Units 24  0527 24  0556 24  0535 24  0443   ALBUMIN g/dL 2.6* 3.1* 3.0* 2.8*   BILIRUBIN mg/dL  0.4  --   --  0.6   ALK PHOS U/L 70  --   --  64   AST (SGOT) U/L 19  --   --  9   ALT (SGPT) U/L <5  --   --  <5     Results from last 7 days   Lab Units 11/06/24  0527 11/05/24  0556 11/04/24  0349 11/03/24  0531 11/02/24  0535 11/01/24  0443 10/31/24  0802   CALCIUM mg/dL 9.0 8.9 9.3 8.3* 8.7 8.5* 8.9   ALBUMIN g/dL 2.6* 3.1*  --   --  3.0* 2.8* 3.2*   MAGNESIUM mg/dL 2.3  --   --  2.2  --  2.1 1.9   PHOSPHORUS mg/dL 2.1* 2.6  --   --  4.2  --  4.0     Results from last 7 days   Lab Units 11/01/24  0444 10/31/24  0802   PROCALCITONIN ng/mL  --  0.39*   LACTATE mmol/L 0.9  --      COVID19   Date Value Ref Range Status   10/08/2024 Not Detected Not Detected - Ref. Range Final   09/28/2024 Not Detected Not Detected - Ref. Range Final     Glucose   Date/Time Value Ref Range Status   11/06/2024 0619 141 (H) 70 - 130 mg/dL Final   11/06/2024 0054 164 (H) 70 - 130 mg/dL Final   11/05/2024 1738 208 (H) 70 - 130 mg/dL Final   11/05/2024 1125 160 (H) 70 - 130 mg/dL Final   11/05/2024 0620 159 (H) 70 - 130 mg/dL Final   11/04/2024 2354 142 (H) 70 - 130 mg/dL Final   11/04/2024 1824 80 70 - 130 mg/dL Final           Duplex Hemodialysis Access CAR    Patent left arteriovenous fistula with normal volume flows.  Moderate   arterial anastomotic and proximal inflow stenosis with chronic septum   noted.  Otherwise widely patent fistula.  Adult Transthoracic Echo Complete W/ Cont if Necessary Per Protocol    Left ventricular systolic function is normal. Left ventricular ejection   fraction appears to be 56 - 60%.    Left ventricular diastolic function was indeterminate.    The right ventricular cavity is mildly dilated. Normal right   ventricular systolic function noted.    : The left atrial cavity is mildly dilated.    The aortic valve leaflets are mildly to moderately calcified    There is moderate mitral annular calcification    Insufficient TR velocity profile to estimate the right ventricular   systolic pressure.    There is  no evidence of pericardial effusion    Scheduled Medications  acetaminophen, 1,000 mg, Nasogastric, Q8H  amitriptyline, 10 mg, Oral, Nightly  arformoterol, 15 mcg, Nebulization, BID - RT  atorvastatin, 40 mg, Oral, Daily  budesonide, 0.5 mg, Nebulization, BID - RT  [Held by provider] carvedilol, 3.125 mg, Oral, Q12H  Check Fentanyl Patch Placement, 1 each, Does not apply, Q12H  gabapentin, 100 mg, Oral, Nightly  heparin (porcine), 5,000 Units, Subcutaneous, Q8H  insulin regular, 2-7 Units, Subcutaneous, Q6H  levothyroxine, 175 mcg, Nasogastric, Q AM  Lidocaine, 1 patch, Transdermal, Q24H  midodrine, 10 mg, Nasogastric, Q8H  pantoprazole, 40 mg, Intravenous, BID AC  risperiDONE, 2 mg, Oral, Daily With Breakfast & Dinner  risperiDONE, 2 mg, Oral, Nightly  senna-docusate sodium, 2 tablet, Nasogastric, Daily  sertraline, 100 mg, Oral, Nightly  sodium chloride, 10 mL, Intravenous, Q12H  torsemide, 100 mg, Oral, Daily  Vancomycin Pharmacy Intermittent/Pulse Dosing, , Does not apply, Daily    Infusions  Pharmacy to dose vancomycin,     Diet  NPO Diet NPO Type: Tube Feeding    I have personally reviewed     [x]  Laboratory   []  Microbiology   []  Radiology   []  EKG/Telemetry  []  Cardiology/Vascular   []  Pathology    []  Records       Assessment/Plan     Active Hospital Problems    Diagnosis  POA    **Discitis of lumbar region [M46.46]  Yes    Severe malnutrition [E43]  Yes    Discitis [M46.40]  Yes    Metabolic encephalopathy [G93.41]  Yes    Aspiration pneumonia [J69.0]  Yes    ESRD on dialysis [N18.6, Z99.2]  Not Applicable    Bipolar disorder [F31.9]  Yes    Chronic respiratory failure with hypoxia [J96.11]  Yes    Essential hypertension [I10]  Yes    Anemia due to chronic kidney disease [N18.9, D63.1]  Yes    Type 2 diabetes mellitus without complication [E11.9]  Yes      Resolved Hospital Problems   No resolved problems to display.       Coag negative staph acteremia  L1/2 discitis , T5/6 discitis.   -Blood  "cultures 10/31/2024 positive for coag negative staph  -IV ceftriaxone discontinued, IV vancomycin continued per ID  -MRI of the cervical spine/thoracic/lumbar-imited studies but thought to have L1/2 discitis along with T5/6 discitis.   -TDC catheter removed 11/3/2024 as likely source of infection.  Vascular surgery managing  -Neurosurgery evaluated, no intervention planned, plan for repeat MRI of lumbar and thoracic spine with and without contrast in 3 months.  -Echocardiogram 11/4/24-normal left ventricular function, EF 56 to 60%, no findings of vegetation/infection  ID evaluated and recommended \"vancomycin dosing per pharmacy with dialysis only x 6 weeks.  Antibiotic stop date December 14  Please monitor weekly CBC with differential and vancomycin troughs and fax the results to the infectious disease clinic at [phone]  We will arrange for ID follow-up on December 13\"  -Continue IV vancomycin with dialysis inpatient  -For back pain scheduled low-dose nightly gabapentin 100 mg, high-dose Tylenol 1000 mg every 8 hours, as needed oxycodone 5 mg every 6 hours for moderate pain,   -Hold Dilaudid for now due to encephalopathy  -Scheduled bowel regimen, hold for loose stools    Hypotension  -Increase midodrine to 10 mg 3 times daily 11/6     Metabolic encephalopathy  Hallucinations  Bipolar disorder  -CT head 10/8/2024 with no acute findings  -Psychiatry following.  On as needed Zyprexa.  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  COPD?  -CT scan 10/41/24 mild patchy and consolidative airspace opacities in the right lower lobe with small adjacent pleural effusion new from previous comparison. Differential includes atelectasis versus early developing infection. -  -Continue arformoterol, budesonide nebulizer      "   ESRD on HD  -has been on home hemodialysis receiving 5 times a week   -Nephrology managing dialysis  -TDC catheter removed 11/3/2024 per vascular as likely source of infection.  Currently on dialysis holiday  -vascular surgery evaluated viability of his left AV fistula,Fistula based upon duplex criteria appears mature and able to be utilized.  Plan to attempt follow-up cannulization, however if fails due to technical issues/agitation, will need repeat TDC catheter placement.          Gastritis  Abdominal pain  -EGD 10/28/2024 showed gastritis  -IV PPI twice daily     Hypothyroidism  -Continue levothyroxine 175 mcg daily    Essential hypertension  -BP soft.  Hold carvedilol        Diabetes mellitus  -SSI.  Hypoglycemic protocol            Anemia of chronic disease  -Hemoglobin stable around 8.  -Monitor and transfuse as indicated for symptomatic anemia or hemoglobin less than 7      DVT prophylaxis: Subcu heparin  Full code.  Palliative care evaluated.  Disposition: SNF, timing to be determined.  Likely not stable to be discharged until early next week.  Expected Discharge Date: 11/8/2024; Expected Discharge Time:        Copied text in this note has been reviewed and is accurate as of 11/06/24.         Dictated utilizing Dragon dictation        Fela Snyder MD  [facility]  11/06/24  09:44 EST

## 2024-11-07 LAB
ALBUMIN SERPL-MCNC: 2.9 G/DL (ref 3.5–5.2)
ANION GAP SERPL CALCULATED.3IONS-SCNC: 7.7 MMOL/L (ref 5–15)
BACTERIA SPEC AEROBE CULT: NORMAL
BACTERIA SPEC AEROBE CULT: NORMAL
BASOPHILS # BLD AUTO: 0.01 10*3/MM3 (ref 0–0.2)
BASOPHILS NFR BLD AUTO: 0.2 % (ref 0–1.5)
BUN SERPL-MCNC: 24 MG/DL (ref 8–23)
BUN/CREAT SERPL: 6 (ref 7–25)
CALCIUM SPEC-SCNC: 8.5 MG/DL (ref 8.6–10.5)
CHLORIDE SERPL-SCNC: 99 MMOL/L (ref 98–107)
CO2 SERPL-SCNC: 25.3 MMOL/L (ref 22–29)
CREAT SERPL-MCNC: 3.99 MG/DL (ref 0.76–1.27)
DEPRECATED RDW RBC AUTO: 49.5 FL (ref 37–54)
EGFRCR SERPLBLD CKD-EPI 2021: 14.6 ML/MIN/1.73
EOSINOPHIL # BLD AUTO: 0.15 10*3/MM3 (ref 0–0.4)
EOSINOPHIL NFR BLD AUTO: 2.6 % (ref 0.3–6.2)
ERYTHROCYTE [DISTWIDTH] IN BLOOD BY AUTOMATED COUNT: 14.5 % (ref 12.3–15.4)
GLUCOSE BLDC GLUCOMTR-MCNC: 168 MG/DL (ref 70–130)
GLUCOSE BLDC GLUCOMTR-MCNC: 182 MG/DL (ref 70–130)
GLUCOSE BLDC GLUCOMTR-MCNC: 192 MG/DL (ref 70–130)
GLUCOSE BLDC GLUCOMTR-MCNC: 194 MG/DL (ref 70–130)
GLUCOSE BLDC GLUCOMTR-MCNC: 232 MG/DL (ref 70–130)
GLUCOSE SERPL-MCNC: 180 MG/DL (ref 65–99)
HCT VFR BLD AUTO: 27.2 % (ref 37.5–51)
HGB BLD-MCNC: 8 G/DL (ref 13–17.7)
IMM GRANULOCYTES # BLD AUTO: 0.06 10*3/MM3 (ref 0–0.05)
IMM GRANULOCYTES NFR BLD AUTO: 1 % (ref 0–0.5)
LYMPHOCYTES # BLD AUTO: 1.49 10*3/MM3 (ref 0.7–3.1)
LYMPHOCYTES NFR BLD AUTO: 26 % (ref 19.6–45.3)
MCH RBC QN AUTO: 27.6 PG (ref 26.6–33)
MCHC RBC AUTO-ENTMCNC: 29.4 G/DL (ref 31.5–35.7)
MCV RBC AUTO: 93.8 FL (ref 79–97)
MONOCYTES # BLD AUTO: 0.45 10*3/MM3 (ref 0.1–0.9)
MONOCYTES NFR BLD AUTO: 7.9 % (ref 5–12)
NEUTROPHILS NFR BLD AUTO: 3.56 10*3/MM3 (ref 1.7–7)
NEUTROPHILS NFR BLD AUTO: 62.3 % (ref 42.7–76)
PHOSPHATE SERPL-MCNC: 1 MG/DL (ref 2.5–4.5)
PLATELET # BLD AUTO: 143 10*3/MM3 (ref 140–450)
PMV BLD AUTO: 10.7 FL (ref 6–12)
POTASSIUM SERPL-SCNC: 3.2 MMOL/L (ref 3.5–5.2)
RBC # BLD AUTO: 2.9 10*6/MM3 (ref 4.14–5.8)
SODIUM SERPL-SCNC: 132 MMOL/L (ref 136–145)
VANCOMYCIN SERPL-MCNC: 22.1 MCG/ML (ref 5–40)
WBC NRBC COR # BLD AUTO: 5.72 10*3/MM3 (ref 3.4–10.8)

## 2024-11-07 PROCEDURE — 97530 THERAPEUTIC ACTIVITIES: CPT

## 2024-11-07 PROCEDURE — 85025 COMPLETE CBC W/AUTO DIFF WBC: CPT | Performed by: INTERNAL MEDICINE

## 2024-11-07 PROCEDURE — 80202 ASSAY OF VANCOMYCIN: CPT | Performed by: STUDENT IN AN ORGANIZED HEALTH CARE EDUCATION/TRAINING PROGRAM

## 2024-11-07 PROCEDURE — 82948 REAGENT STRIP/BLOOD GLUCOSE: CPT

## 2024-11-07 PROCEDURE — 94799 UNLISTED PULMONARY SVC/PX: CPT

## 2024-11-07 PROCEDURE — 25010000002 HEPARIN (PORCINE) PER 1000 UNITS: Performed by: STUDENT IN AN ORGANIZED HEALTH CARE EDUCATION/TRAINING PROGRAM

## 2024-11-07 PROCEDURE — 94664 DEMO&/EVAL PT USE INHALER: CPT

## 2024-11-07 PROCEDURE — 63710000001 INSULIN REGULAR HUMAN PER 5 UNITS: Performed by: NURSE PRACTITIONER

## 2024-11-07 PROCEDURE — 97110 THERAPEUTIC EXERCISES: CPT

## 2024-11-07 PROCEDURE — 80069 RENAL FUNCTION PANEL: CPT | Performed by: INTERNAL MEDICINE

## 2024-11-07 PROCEDURE — 99024 POSTOP FOLLOW-UP VISIT: CPT | Performed by: SURGERY

## 2024-11-07 PROCEDURE — 94761 N-INVAS EAR/PLS OXIMETRY MLT: CPT

## 2024-11-07 RX ORDER — OXYCODONE HCL 5 MG/5 ML
2.5 SOLUTION, ORAL ORAL EVERY 6 HOURS PRN
Status: DISPENSED | OUTPATIENT
Start: 2024-11-07 | End: 2024-11-09

## 2024-11-07 RX ORDER — POTASSIUM CHLORIDE 1.5 G/1.58G
20 POWDER, FOR SOLUTION ORAL ONCE
Status: COMPLETED | OUTPATIENT
Start: 2024-11-07 | End: 2024-11-07

## 2024-11-07 RX ORDER — ALBUMIN (HUMAN) 12.5 G/50ML
12.5 SOLUTION INTRAVENOUS AS NEEDED
Status: CANCELLED | OUTPATIENT
Start: 2024-11-08 | End: 2024-11-08

## 2024-11-07 RX ADMIN — POTASSIUM CHLORIDE 20 MEQ: 1.5 FOR SOLUTION ORAL at 09:04

## 2024-11-07 RX ADMIN — INSULIN HUMAN 2 UNITS: 100 INJECTION, SOLUTION PARENTERAL at 13:11

## 2024-11-07 RX ADMIN — ARIPIPRAZOLE 2 MG: 2 TABLET ORAL at 21:31

## 2024-11-07 RX ADMIN — SENNOSIDES AND DOCUSATE SODIUM 2 TABLET: 50; 8.6 TABLET ORAL at 09:04

## 2024-11-07 RX ADMIN — LIDOCAINE 1 PATCH: 4 PATCH TOPICAL at 09:04

## 2024-11-07 RX ADMIN — ACETAMINOPHEN 1000 MG: 500 TABLET ORAL at 17:07

## 2024-11-07 RX ADMIN — TORSEMIDE 100 MG: 100 TABLET ORAL at 09:04

## 2024-11-07 RX ADMIN — SERTRALINE 100 MG: 100 TABLET, FILM COATED ORAL at 21:31

## 2024-11-07 RX ADMIN — PANTOPRAZOLE SODIUM 40 MG: 40 INJECTION, POWDER, FOR SOLUTION INTRAVENOUS at 17:07

## 2024-11-07 RX ADMIN — ACETAMINOPHEN 1000 MG: 500 TABLET ORAL at 00:22

## 2024-11-07 RX ADMIN — ARFORMOTEROL TARTRATE 15 MCG: 15 SOLUTION RESPIRATORY (INHALATION) at 19:55

## 2024-11-07 RX ADMIN — INSULIN HUMAN 3 UNITS: 100 INJECTION, SOLUTION PARENTERAL at 17:07

## 2024-11-07 RX ADMIN — INSULIN HUMAN 2 UNITS: 100 INJECTION, SOLUTION PARENTERAL at 06:43

## 2024-11-07 RX ADMIN — PANTOPRAZOLE SODIUM 40 MG: 40 INJECTION, POWDER, FOR SOLUTION INTRAVENOUS at 06:38

## 2024-11-07 RX ADMIN — MIDODRINE HYDROCHLORIDE 10 MG: 5 TABLET ORAL at 17:07

## 2024-11-07 RX ADMIN — BUDESONIDE 0.5 MG: 0.5 INHALANT ORAL at 07:05

## 2024-11-07 RX ADMIN — OXYCODONE HYDROCHLORIDE 5 MG: 5 SOLUTION ORAL at 09:04

## 2024-11-07 RX ADMIN — DIBASIC SODIUM PHOSPHATE, MONOBASIC POTASSIUM PHOSPHATE AND MONOBASIC SODIUM PHOSPHATE 2 TABLET: 852; 155; 130 TABLET ORAL at 14:53

## 2024-11-07 RX ADMIN — ARFORMOTEROL TARTRATE 15 MCG: 15 SOLUTION RESPIRATORY (INHALATION) at 07:04

## 2024-11-07 RX ADMIN — OXYCODONE HYDROCHLORIDE 2.5 MG: 5 SOLUTION ORAL at 17:07

## 2024-11-07 RX ADMIN — DIBASIC SODIUM PHOSPHATE, MONOBASIC POTASSIUM PHOSPHATE AND MONOBASIC SODIUM PHOSPHATE 2 TABLET: 852; 155; 130 TABLET ORAL at 21:31

## 2024-11-07 RX ADMIN — ACETAMINOPHEN 1000 MG: 500 TABLET ORAL at 09:04

## 2024-11-07 RX ADMIN — ATORVASTATIN CALCIUM 40 MG: 20 TABLET, FILM COATED ORAL at 09:04

## 2024-11-07 RX ADMIN — INSULIN HUMAN 2 UNITS: 100 INJECTION, SOLUTION PARENTERAL at 00:22

## 2024-11-07 RX ADMIN — Medication 10 ML: at 09:04

## 2024-11-07 RX ADMIN — HEPARIN SODIUM 5000 UNITS: 5000 INJECTION INTRAVENOUS; SUBCUTANEOUS at 06:29

## 2024-11-07 RX ADMIN — DIBASIC SODIUM PHOSPHATE, MONOBASIC POTASSIUM PHOSPHATE AND MONOBASIC SODIUM PHOSPHATE 2 TABLET: 852; 155; 130 TABLET ORAL at 17:09

## 2024-11-07 RX ADMIN — HEPARIN SODIUM 5000 UNITS: 5000 INJECTION INTRAVENOUS; SUBCUTANEOUS at 21:31

## 2024-11-07 RX ADMIN — BUDESONIDE 0.5 MG: 0.5 INHALANT ORAL at 19:55

## 2024-11-07 RX ADMIN — Medication 10 ML: at 21:32

## 2024-11-07 RX ADMIN — MIDODRINE HYDROCHLORIDE 10 MG: 5 TABLET ORAL at 06:38

## 2024-11-07 RX ADMIN — ARIPIPRAZOLE 2 MG: 2 TABLET ORAL at 09:04

## 2024-11-07 RX ADMIN — LEVOTHYROXINE SODIUM 175 MCG: 175 TABLET ORAL at 06:28

## 2024-11-07 RX ADMIN — HEPARIN SODIUM 5000 UNITS: 5000 INJECTION INTRAVENOUS; SUBCUTANEOUS at 13:12

## 2024-11-07 NOTE — CASE MANAGEMENT/SOCIAL WORK
Continued Stay Note  [facility]     Patient Name: Nelson Wang  MRN: [id number]  Today's Date: 11/7/2024    Admit Date: 11/1/2024    Plan: Rehab   Discharge Plan       Row Name 11/07/24 1656       Plan    Plan Rehab    Patient/Family in Agreement with Plan yes    Plan Comments Met with pts daughter in room. She confirmed the plan is for the pt to go to rehab at discharge. Rehab acceptance is pending core trak removal. He may need transportation at discharge. She denied any other needs at this time. CCP following. PARAG Masterson RN                   Discharge Codes    No documentation.                 Expected Discharge Date and Time       Expected Discharge Date Expected Discharge Time    Nov 10, 2024               Rudolph Easton RN

## 2024-11-07 NOTE — PROGRESS NOTES
"[facility] Clinical Pharmacy Services: Vancomycin Monitoring Note    Nelson Wang is a 79 y.o. male who is on day 7 of pharmacy to dose vancomycin for Bone and/or Joint Infection.  Stop date 12/13/24  Previous Vancomycin Dose:   intermittent dosing  Updated Cultures and Sensitivities: BC: NG x 3 days  Results from last 7 days   Lab Units 11/07/24 0445 11/06/24  0527 11/03/24  0823   VANCOMYCIN RM mcg/mL 22.10 13.50 13.20     Vitals/Labs  Ht: 167 cm (65.75\"); Wt: 68.9 kg (151 lb 14.4 oz)   Temp Readings from Last 1 Encounters:   11/07/24 97 °F (36.1 °C) (Oral)     Estimated Creatinine Clearance: 8 mL/min (A) (by C-G formula based on SCr of 7.39 mg/dL (H)).   Dialysis; assessing for need daily (5x/week at home)    Results from last 7 days   Lab Units 11/07/24 0631 11/07/24 0445 11/06/24 0527 11/05/24  0556   CREATININE mg/dL 3.99*  --  7.39* 5.14*   WBC 10*3/mm3  --  5.72 5.82 4.15     Assessment/Plan    Vancomycin Dose: Random level this am = 22.1 mcg/ml (no vancomycin dosing needed for today) Will be getting HD tomorrow so will probably give 750mg after HD tomorrow.   Next Level Date and Time: Vanc Random on 11/8/24 at 0600  We will continue to monitor patient changes and renal function     Thank you for involving pharmacy in this patient's care. Please contact pharmacy with any questions or concerns.       Stan Wesley, [facility]  Clinical Pharmacist           "

## 2024-11-07 NOTE — PROGRESS NOTES
"Nutrition Services    Patient Name:  Nelson Wang  YOB: 1945  MRN: [id number]  Admit Date:  11/1/2024  Assessment Date:  11/07/24    CLINICAL NUTRITION    Patient with VFSS this am.  Diet advanced to Consistent Carb with chopped meats, thin liquids.  TF modifieed to Novasource Renal from 8p-8a with 10 ml water/hr to provide 960 kcal, 46 gm protein, and total of 461 ml free water meeting approx 57% of kcal and pro needs    Comments: Follow up for tube feeding  Current TF's: Novasource Renal at 40ml/hr and water 10 ml qhr x 12 hours.  Pt tolerating TF's at goal rate.   Labs: Noted    Plan/Recommendations:   Will monitor po intake and tube feeding    RD to continue to monitor per protocol.     Encounter Information         Reason For Encounter Follow up for TF    Current Issues ESRD, anemia, HTN, acute encephalopathy, T5/6 discitis/osteomyelitis, dyshphagia     Estimated Requirements         Calories  1675 (25 kcal/kg)    Protein (gm)  74-89 (1.0 - 1.2 gm/kg)    Fluid (mL)   (1 mL/kcal)     Current Nutrition Orders & Evaluatio.n of Intake       Oral Nutrition     Current PO Diet Diet: Regular/House; Feeding Assistance - Nursing; Texture: Pureed (NDD 1); Fluid Consistency: Honey Thick   Supplement n/a   PO Evaluation     Trending % PO Intake <25%    Factors Affecting Intake  altered mental status, swallow impairment      Enteral Nutrition     Enteral Route NG    TF Delivery Method Continuous    Propofol Rate/Kcal     Current TF/Rate (mL) Novasource Renal @ 40 mL/hr x 12 hours    TF Goal Rate (mL) 40    Current Water Flush (mL) 10 Q hr    Modular None    TF Residual  no or minimal residual    TF Tolerance tolerating    TF Observation Verified correct TF and water flush infusing per orders     Anthropometrics          Height    Weight Height: 167 cm (65.75\")  Weight: 72.7 kg (160 lb 3.2 oz) (11/07/24 0621)    BMI kg/m2 Body mass index is 26.06 kg/m².  Overweight (25 - 29.9)    Weight trend Loss, Gain "     Labs        Pertinent Labs Reviewed, listed below     Results from last 7 days   Lab Units 11/07/24  0631 11/06/24  0527 11/05/24  0556 11/02/24  0535 11/01/24 0443   SODIUM mmol/L 132* 135* 134*   < > 133*   POTASSIUM mmol/L 3.2* 3.5 3.4*   < > 4.8   CHLORIDE mmol/L 99 102 102   < > 98   CO2 mmol/L 25.3 21.3* 21.4*   < > 22.6   BUN mg/dL 24* 38* 23   < > 32*   CREATININE mg/dL 3.99* 7.39* 5.14*   < > 6.21*   CALCIUM mg/dL 8.5* 9.0 8.9   < > 8.5*   BILIRUBIN mg/dL  --  0.4  --   --  0.6   ALK PHOS U/L  --  70  --   --  64   ALT (SGPT) U/L  --  <5  --   --  <5   AST (SGOT) U/L  --  19  --   --  9   GLUCOSE mg/dL 180* 156* 161*   < > 108*    < > = values in this interval not displayed.     Results from last 7 days   Lab Units 11/07/24  0631 11/07/24 0445 11/06/24  0527 11/03/24  0532 11/03/24  0531 11/01/24  0444 11/01/24 0443   MAGNESIUM mg/dL  --   --  2.3  --  2.2  --  2.1   PHOSPHORUS mg/dL 1.0*  --  2.1*   < >  --    < >  --    HEMOGLOBIN g/dL  --  8.0* 8.4*   < >  --    < >  --    HEMATOCRIT %  --  27.2* 27.4*   < >  --    < >  --    WBC 10*3/mm3  --  5.72 5.82   < >  --    < >  --    ALBUMIN g/dL 2.9*  --  2.6*   < >  --    < > 2.8*    < > = values in this interval not displayed.     Results from last 7 days   Lab Units 11/07/24 0445 11/06/24  0527 11/05/24  0556 11/04/24  0349 11/03/24  0532   PLATELETS 10*3/mm3 143 138* 126* 139* 164     COVID19   Date Value Ref Range Status   10/08/2024 Not Detected Not Detected - Ref. Range Final     Lab Results   Component Value Date    HGBA1C 6.90 (H) 10/08/2024          Medications            Scheduled Medications acetaminophen, 1,000 mg, Nasogastric, Q8H  amitriptyline, 10 mg, Oral, Nightly  arformoterol, 15 mcg, Nebulization, BID - RT  ARIPiprazole, 2 mg, Oral, BID  atorvastatin, 40 mg, Oral, Daily  budesonide, 0.5 mg, Nebulization, BID - RT  [Held by provider] carvedilol, 3.125 mg, Oral, Q12H  Check Fentanyl Patch Placement, 1 each, Does not apply,  Q12H  gabapentin, 100 mg, Oral, Nightly  heparin (porcine), 5,000 Units, Subcutaneous, Q8H  insulin regular, 2-7 Units, Subcutaneous, Q6H  levothyroxine, 175 mcg, Nasogastric, Q AM  Lidocaine, 1 patch, Transdermal, Q24H  midodrine, 10 mg, Oral, TID AC  pantoprazole, 40 mg, Intravenous, BID AC  risperiDONE, 2 mg, Oral, Daily With Breakfast & Dinner  senna-docusate sodium, 2 tablet, Nasogastric, Daily  sertraline, 100 mg, Oral, Nightly  sodium chloride, 10 mL, Intravenous, Q12H  torsemide, 100 mg, Oral, Daily  Vancomycin Pharmacy Intermittent/Pulse Dosing, , Does not apply, Daily        Infusions Pharmacy to dose vancomycin,         PRN Medications   [DISCONTINUED] senna-docusate sodium **AND** polyethylene glycol **AND** bisacodyl **AND** bisacodyl    cloNIDine    dextrose    dextrose    famotidine    glucagon (human recombinant)    heparin (porcine)    ipratropium-albuterol    labetalol    melatonin    nitroglycerin    OLANZapine    ondansetron    oxyCODONE    Pharmacy to dose vancomycin    sodium chloride    sodium chloride     Physical Findings          Physical Appearance alert, disoriented, sitting in chair, family present   Oral/Mouth Cavity tooth or teeth missing   Edema  no edema   Gastrointestinal last bowel movement: 11/8   Skin  bruising   Tubes/Drains Cortrak, dialysis catheter   NFPE Not indicated at this time     NUTRITION INTERVENTION / PLAN OF CARE  Intervention Goal         Intervention Goal(s) Reduce/improve symptoms, Meet estimated needs, Disease management/therapy, Tolerate PO , Increase intake, Transition TF to PO, and No significant weight loss     Nutrition Intervention         RD Action Encourage intake, Continue to monitor, Care plan reviewed, and Other (comment): continue to assess needs for TF's     Monitor/Evaluation        Monitor Per protocol, I&O, PO intake, Pertinent labs, EN delivery/tolerance, Weight, Skin status, GI status, Symptoms, Swallow function   Discharge Needs Pending  clinical course       Electronically signed by:  Elisabeth Liao RD  11/07/24 08:01 EST

## 2024-11-07 NOTE — PROGRESS NOTES
Family reports that the patient seems improved with discontinuation of Risperdal and reinitiation of Abilify.  He is resting comfortably today after receiving pain medication but family reports that his overall trend of improvement is encouraging.

## 2024-11-07 NOTE — PROGRESS NOTES
[facility] Progress Note      Patient Name: Nelson Wang  : 1945  MRN: [id number]  Primary Care Physician:  Domingo Bravo MD  Date of admission: 2024    Subjective     Interval History:   F/u ESRD     The patient is more awake but remains confused, he had dialysis yesterday using the AV fistula without any difficulties.  Review of Systems:   Not obtainable    Objective     Vitals:   Temp:  [97 °F (36.1 °C)-98 °F (36.7 °C)] 97 °F (36.1 °C)  Heart Rate:  [30-80] 67  Resp:  [16-18] 18  BP: ()/(42-64) 121/44  Flow (L/min) (Oxygen Therapy):  [2-3] 3    Intake/Output Summary (Last 24 hours) at 2024 0848  Last data filed at 2024 0600  Gross per 24 hour   Intake 1217 ml   Output 800 ml   Net 417 ml       Physical Exam:    General Appearance: Chronically ill, awake with eyes open but not answering question,   HEENT: Core track in place  Neck: No JVD  Lungs: Scattered rhonchi, unlabored breathing  Heart: RRR, no rub  Abdomen: soft, nontender, nondistended  Extremities: no edema, LUE AVF with thrill and bruit    Scheduled Meds:     acetaminophen, 1,000 mg, Nasogastric, Q8H  amitriptyline, 10 mg, Oral, Nightly  arformoterol, 15 mcg, Nebulization, BID - RT  ARIPiprazole, 2 mg, Oral, BID  atorvastatin, 40 mg, Oral, Daily  budesonide, 0.5 mg, Nebulization, BID - RT  [Held by provider] carvedilol, 3.125 mg, Oral, Q12H  Check Fentanyl Patch Placement, 1 each, Does not apply, Q12H  gabapentin, 100 mg, Oral, Nightly  heparin (porcine), 5,000 Units, Subcutaneous, Q8H  insulin regular, 2-7 Units, Subcutaneous, Q6H  levothyroxine, 175 mcg, Nasogastric, Q AM  Lidocaine, 1 patch, Transdermal, Q24H  midodrine, 10 mg, Oral, TID AC  pantoprazole, 40 mg, Intravenous, BID AC  phosphorus, 500 mg, Nasogastric, 4x Daily  potassium chloride, 20 mEq, Nasogastric, Once  risperiDONE, 2 mg, Oral, Daily With Breakfast & Dinner  senna-docusate sodium, 2 tablet, Nasogastric,  Daily  sertraline, 100 mg, Oral, Nightly  sodium chloride, 10 mL, Intravenous, Q12H  torsemide, 100 mg, Oral, Daily  Vancomycin Pharmacy Intermittent/Pulse Dosing, , Does not apply, Daily      IV Meds:   Pharmacy to dose vancomycin,         Results Reviewed:   I have personally reviewed the results from the time of this admission to 11/7/2024 08:48 EST     Results from last 7 days   Lab Units 11/07/24 0631 11/06/24 0527 11/05/24 0556 11/02/24 0535 11/01/24 0443   SODIUM mmol/L 132* 135* 134*   < > 133*   POTASSIUM mmol/L 3.2* 3.5 3.4*   < > 4.8   CHLORIDE mmol/L 99 102 102   < > 98   CO2 mmol/L 25.3 21.3* 21.4*   < > 22.6   BUN mg/dL 24* 38* 23   < > 32*   CREATININE mg/dL 3.99* 7.39* 5.14*   < > 6.21*   CALCIUM mg/dL 8.5* 9.0 8.9   < > 8.5*   BILIRUBIN mg/dL  --  0.4  --   --  0.6   ALK PHOS U/L  --  70  --   --  64   ALT (SGPT) U/L  --  <5  --   --  <5   AST (SGOT) U/L  --  19  --   --  9   GLUCOSE mg/dL 180* 156* 161*   < > 108*    < > = values in this interval not displayed.     Estimated Creatinine Clearance: 15.7 mL/min (A) (by C-G formula based on SCr of 3.99 mg/dL (H)).  Results from last 7 days   Lab Units 11/07/24 0631 11/06/24 0527 11/05/24 0556 11/03/24 0531 11/02/24 0535 11/01/24 0443   MAGNESIUM mg/dL  --  2.3  --  2.2  --  2.1   PHOSPHORUS mg/dL 1.0* 2.1* 2.6  --    < >  --     < > = values in this interval not displayed.         Results from last 7 days   Lab Units 11/07/24 0445 11/06/24 0527 11/05/24 0556 11/04/24  0349 11/03/24  0532   WBC 10*3/mm3 5.72 5.82 4.15 6.86 6.03   HEMOGLOBIN g/dL 8.0* 8.4* 8.5* 9.3* 8.4*   PLATELETS 10*3/mm3 143 138* 126* 139* 164           Assessment / Plan     ASSESSMENT:  End-stage renal disease - been on home hemodialysis receiving 5 times a week (Dr Castro follows), via [facility] TDC due to LUE AVF dysfunction (placed in July).  Catheter was removed, h he had dialysis yesterday using his AV fistula and was given midodrine blood pressure was reasonably  controlled and tolerated the treatment without any difficulty  anemia of chronic kidney disease on long-acting RONALD his hemoglobin today is 8, platelet 148,000 slightly decreased  Hypertension with chronic kidney disease controlled.  Hypothyroidism on replacement therapy  Acute encephalopathy   T5/6 discitis/osteomyelitis -followed by MRI with contrast 11/2 shows L1/2 & T5/6 discitis  Coag negative staph bacteremia currently on vancomycin  Hypokalemia and hypophosphatemia    PLAN:  Continue the same treatment  Replete potassium and phosphorus  Surveillance labs  Hemodialysis tomorrow, will use AV fistula again and will give midodrine    I reviewed the chart and other providers notes, reviewed labs.  Discussed the case with the patient's daughter at the bedside.  Copied text in this note has been reviewed and is accurate as of 11/07/24.           Bart Wolf MD  11/07/24  08:48 [facility]    [facility]  [phone]

## 2024-11-07 NOTE — THERAPY TREATMENT NOTE
Patient Name: Nelson Wang  : 1945    MRN: [id number]                              Today's Date: 2024       Admit Date: 2024    Visit Dx: No diagnosis found.  Patient Active Problem List   Diagnosis    Essential hypertension    Bradycardia, sinus    Anemia due to chronic kidney disease    Hypothyroidism    Idiopathic gout    Proteinuria    Psoriasis    Thrombocytopenia    Type 2 diabetes mellitus without complication    CKD (chronic kidney disease), stage IV    Hyperlipidemia    Monoclonal gammopathy of unknown significance (MGUS)    Stage 5 chronic kidney disease    Chronic gout without tophus    Peritoneal dialysis catheter dysfunction    Medicare annual wellness visit, subsequent    Chronic cough    Peritonitis associated with peritoneal dialysis    Recurrent pneumonia    Acute on chronic respiratory failure with hypoxia    End stage renal disease    Aspiration pneumonitis    Sepsis    Type 2 diabetes mellitus, with long-term current use of insulin    GERD without esophagitis    Immunosuppression due to drug therapy    Bipolar disorder    Chronic respiratory failure with hypoxia    Shortness of breath    Abnormal stress test    ESRD on dialysis    Abnormal chest CT    Encounter for screening for malignant neoplasm of colon    History of colon polyps    Family history of colon cancer    Intractable pain    Abdominal pain    ESRD (end stage renal disease) on dialysis    AMS (altered mental status)    Hallucinations    LUQ pain    Discitis    Metabolic encephalopathy    Aspiration pneumonia    Discitis of lumbar region    Severe malnutrition     Past Medical History:   Diagnosis Date    Abnormal stress test     Anemia     IRON INFUSIONS WITH DIALYSIS    Anesthesia     WITH HIP REPLACEMENT DAUGHTER BELIEVES HAD SVT     Ankle pain, right     Anxiety and depression     CKD (chronic kidney disease), stage IV     DIALYSIS [id number] SHILEY IN RIGHT CHEST    Diabetes     Gout     High  cholesterol     History of peritoneal dialysis     HL (hearing loss)     Hyperkalemia     Hypertension     Hypothyroidism     Insomnia     Night terrors     Psoriasis     Psoriasis     Sleep walking     Thrombocytopenia     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    Vitiligo      Past Surgical History:   Procedure Laterality Date    ANKLE SURGERY Right 11/1990    APPENDECTOMY N/A 1954    ARTERIOVENOUS FISTULA/SHUNT SURGERY Left 07/16/2024    Procedure: Creation of left arm arteriovenous fistula;  Surgeon: Moses Mir MD;  Location: [facility] MAIN OR;  Service: Vascular;  Laterality: Left;    BRONCHOSCOPY N/A 03/01/2024    Procedure: BRONCHOSCOPY WITH BAL AND WASHINGS;  Surgeon: Sandra Espinal MD;  Location: [facility] ENDOSCOPY;  Service: Pulmonary;  Laterality: N/A;  PNEUMONIA    BRONCHOSCOPY N/A 08/30/2024    Procedure: BRONCHOSCOPY WITH BALS AND WASHINGS;  Surgeon: Sandra Espinal MD;  Location: [facility] ENDOSCOPY;  Service: Pulmonary;  Laterality: N/A;  MUCOUS PLUGGING    CARDIAC CATHETERIZATION N/A 05/29/2024    Procedure: Left Heart Cath with possible coronary angioplasty;  Surgeon: Stephan Nichols MD;  Location: [facility] CATH INVASIVE LOCATION;  Service: Cardiology;  Laterality: N/A;    COLONOSCOPY N/A 06/2022    [facility]    ENDOSCOPY N/A 10/28/2024    Procedure: ESOPHAGOGASTRODUODENOSCOPY INSERTION OF LIGHTED INSTRUMENT TO VIEW ESOPHAGUS, STOMACH AND SMALL INTESTINE;  Surgeon: Kingsley Peraza MD;  Location: [facility] ENDOSCOPY;  Service: Gastroenterology;  Laterality: N/A;  Gastritis    HIP BIPOLAR REPLACEMENT Right 01/2000    INSERTION HEMODIALYSIS CATHETER Left 04/09/2024    Procedure: HEMODIALYSIS CATHETER INSERTION;  Surgeon: Jose Berry MD;  Location: [facility] OR;  Service: General;  Laterality: Left;    INSERTION HEMODIALYSIS CATHETER N/A 04/12/2024    Procedure: Tunneled hemodialysis catheter insertion;  Surgeon: Enrique Vinson MD;  Location: [facility] OR;  Service: Vascular;   Laterality: N/A;    INSERTION PERITONEAL DIALYSIS CATHETER N/A 03/27/2023    Procedure: LAPAROSCOPIC INSERTION PERITONEAL DIALYSIS CATHETER, LAPAROSCOPIC OMENTOPEXY WITH LYSIS OF ADHESIONS;  Surgeon: Jose Berry MD;  Location: [facility] MAIN OR;  Service: General;  Laterality: N/A;    INSERTION PERITONEAL DIALYSIS CATHETER Left 07/23/2023    Procedure: REVISION OF PERITONEAL DIALYSIS CATHETER;  Surgeon: Radha Oreilly MD;  Location: [facility] MAIN OR;  Service: General;  Laterality: Left;    REMOVAL PERITONEAL DIALYSIS CATHETER N/A 04/09/2024    Procedure: REMOVAL PERITONEAL DIALYSIS CATHETER;  Surgeon: Jose Berry MD;  [facility];  Service: General;  Laterality: N/A;    RENAL BIOPSY Left 07/15/2022    UPPER GASTROINTESTINAL ENDOSCOPY      WRIST SURGERY      UNSURE WHICH SIDE DAUGHTER REPORTS HAD SEVERE  CUT FROM WINDOW AND THEY HAD TO DO RECONSTRUCTIVE SURGERY WITH VESSELS AND NERVES      General Information       Row Name 11/07/24 1129          Physical Therapy Time and Intention    Document Type therapy note (daily note)  -EB     Mode of Treatment physical therapy  -EB       Row Name 11/07/24 1129          General Information    Patient Profile Reviewed yes  -EB     Existing Precautions/Restrictions fall  -EB       Row Name 11/07/24 1129          Cognition    Orientation Status (Cognition) oriented to;person;place  -EB       Row Name 11/07/24 1129          Safety Issues/Impairments Affecting Functional Mobility    Impairments Affecting Function (Mobility) balance;endurance/activity tolerance;strength;postural/trunk control;range of motion (ROM);pain  -EB               User Key  (r) = Recorded By, (t) = Taken By, (c) = Cosigned By      Initials Name Provider Type    EB Gema Crespo PTA Physical Therapist Assistant                   Mobility       Row Name 11/07/24 1129          Bed Mobility    Supine-Sit [place] (Bed Mobility) maximum assist (25% patient effort);2  person assist;verbal cues;nonverbal cues (demo/gesture)  -EB     Sit-Supine [place] (Bed Mobility) moderate assist (50% patient effort);2 person assist;verbal cues;nonverbal cues (demo/gesture)  -EB     Assistive Device (Bed Mobility) bed rails;head of bed elevated  -EB     Comment, (Bed Mobility) little initiation with bed mobility today.  -EB       Row Name 11/07/24 1129          Sit-Stand Transfer    Sit-Stand [place] (Transfers) moderate assist (50% patient effort);minimum assist (75% patient effort);2 person assist  -EB     Assistive Device (Sit-Stand Transfers) --  HHAX2  -EB     Comment, (Sit-Stand Transfer) 3 stands from EOB for functional strengthening. Pt's posture improved with each stand.  -EB       Row Name 11/07/24 1129          Gait/Stairs (Locomotion)    [place] Level (Gait) moderate assist (50% patient effort);2 person assist  -EB     Assistive Device (Gait) --  HHAX2  -EB     Distance in Feet (Gait) --  4 side steps then 2 steps.  -EB     Deviations/Abnormal Patterns (Gait) gait speed decreased;stride length decreased  -EB     Comment, (Gait/Stairs) decreased coordination Left > than Right.  -EB               User Key  (r) = Recorded By, (t) = Taken By, (c) = Cosigned By      Initials Name Provider Type    Gema Garcia PTA Physical Therapist Assistant                   Obj/Interventions       Row Name 11/07/24 1134          Motor Skills    Therapeutic Exercise --  BLE: LAQs and seated marches (X10)  -EB               User Key  (r) = Recorded By, (t) = Taken By, (c) = Cosigned By      Initials Name Provider Type    Gema Garcia PTA Physical Therapist Assistant                   Goals/Plan    No documentation.                  Clinical Impression       Row Name 11/07/24 1134          Pain    Pretreatment Pain Rating 8/10  -EB     Posttreatment Pain Rating 9/10  -EB     Pain Location back  -EB     Pain Side/Orientation right;lower  -EB       Row Name 11/07/24 1134           Plan of Care Review    Plan of Care Reviewed With patient  -EB     Progress improving  -EB     Outcome Evaluation Pt seen for PT tx this AM. Pt continues to progress with mobility. Pt reports increased back pain with mobility more today than yesterday. Pt completed supine to sit with MaxA of 2 and completed 3 stands from EOB. Pt ModAX2 with initial stand but progressed with each stand. Pt able to stand fully upright and MinAX2 with last stand. Pt took 4 lateral side steps then 2 sides steps with ModAX2. Pt has decreased coordination left LE > than right LE when side stepping. Pt returned to supine with ModAX2. Will continue to follow pt for d/c needs.  -       Row Name 11/07/24 1134          Therapy Assessment/Plan (PT)    Therapy Frequency (PT) 5 times/wk  -       Row Name 11/07/24 1134          Positioning and Restraints    Pre-Treatment Position in bed  -EB     Post Treatment Position bed  -EB     In Bed supine;call light within reach;encouraged to call for assist;exit alarm on  -EB               User Key  (r) = Recorded By, (t) = Taken By, (c) = Cosigned By      Initials Name Provider Type    Gema Garcia PTA Physical Therapist Assistant                   Outcome Measures       Row Name 11/07/24 1141 11/07/24 0904       How much help from another person do you currently need...    Turning from your back to your side while in flat bed without using bedrails? 3  -EB 3  -PC    Moving from lying on back to sitting on the side of a flat bed without bedrails? 2  -EB 2  -PC    Moving to and from a bed to a chair (including a wheelchair)? 2  -EB 2  -PC    Standing up from a chair using your arms (e.g., wheelchair, bedside chair)? 2  -EB 2  -PC    Climbing 3-5 steps with a railing? 1  -EB 1  -PC    To walk in hospital room? 1  -EB 2  -PC    AM-PAC 6 Clicks Score (PT) 11  -EB 12  -PC              User Key  (r) = Recorded By, (t) = Taken By, (c) = Cosigned By      Initials Name Provider Type    Gema Garcia  GREER Physical Therapist Assistant    Shay Carney RN Registered Nurse                                 Physical Therapy Education       Title: PT OT SLP Therapies (In Progress)       Topic: Physical Therapy (Done)       Point: Mobility training (Done)       Learning Progress Summary            Patient Acceptance, E,D, VU,NR by EB at 11/7/2024 1142    Acceptance, E,D, VU,NR by EB at 11/6/2024 1042    Acceptance, E,TB, NR by MS at 11/5/2024 1538    Acceptance, E, NL,NR by KT at 11/4/2024 1144                      Point: Home exercise program (Done)       Learning Progress Summary            Patient Acceptance, E,D, VU,NR by EB at 11/7/2024 1142    Acceptance, E,D, VU,NR by EB at 11/6/2024 1042    Acceptance, E,TB, NR by MS at 11/5/2024 1538    Acceptance, E, NL,NR by KT at 11/4/2024 1144                      Point: Body mechanics (Done)       Learning Progress Summary            Patient Acceptance, E,D, VU,NR by EB at 11/7/2024 1142    Acceptance, E,D, VU,NR by EB at 11/6/2024 1042    Acceptance, E,TB, NR by MS at 11/5/2024 1538    Acceptance, E, NL,NR by KT at 11/4/2024 1144                      Point: Precautions (Done)       Learning Progress Summary            Patient Acceptance, E,D, VU,NR by EB at 11/7/2024 1142    Acceptance, E,TB, NR by MS at 11/5/2024 1538    Acceptance, E, NL,NR by KT at 11/4/2024 1144                                      User Key       Initials Effective Dates Name Provider Type Discipline    MS 06/16/21 -  Twila Puente PT Physical Therapist PT    EB 02/14/23 -  Gema Crespo PTA Physical Therapist Assistant PT    KT 09/04/24 -  Clara Ortiz RN Registered Nurse Nurse                  PT Recommendation and Plan     Progress: improving  Outcome Evaluation: Pt seen for PT tx this AM. Pt continues to progress with mobility. Pt reports increased back pain with mobility more today than yesterday. Pt completed supine to sit with MaxA of 2 and completed 3 stands from EOB. Pt  ModAX2 with initial stand but progressed with each stand. Pt able to stand fully upright and MinAX2 with last stand. Pt took 4 lateral side steps then 2 sides steps with ModAX2. Pt has decreased coordination left LE > than right LE when side stepping. Pt returned to supine with ModAX2. Will continue to follow pt for d/c needs.     Time Calculation:         PT Charges       Row Name 11/07/24 1142             Time Calculation    Start Time 0847  -EB      Stop Time 0913  -EB      Time Calculation (min) 26 min  -EB      PT Received On 11/07/24  -EB      PT - Next Appointment 11/08/24  -EB         Time Calculation- PT    Total Timed Code Minutes- PT 26 minute(s)  -EB                User Key  (r) = Recorded By, (t) = Taken By, (c) = Cosigned By      Initials Name Provider Type    EB Gema Crespo PTA Physical Therapist Assistant                  Therapy Charges for Today       Code Description Service Date Service Provider Modifiers Qty    58454385083 HC PT THER PROC EA 15 MIN 11/6/2024 Gema Crespo PTA GP 1    96564152384 HC PT THERAPEUTIC ACT EA 15 MIN 11/6/2024 Gema Crespo PTA GP 1    82794320656 HC PT THER SUPP EA 15 MIN 11/6/2024 Gema Crespo PTA GP 1    81721021465 HC PT THERAPEUTIC ACT EA 15 MIN 11/7/2024 Gema Crespo PTA GP 1    00596771764 HC PT THER PROC EA 15 MIN 11/7/2024 Gema Crespo PTA GP 1    98455011047 HC PT THER SUPP EA 15 MIN 11/7/2024 Gema Crespo, GREER GP 1            PT G-Codes  AM-PAC 6 Clicks Score (PT): 11       Gema Crespo PTA  11/7/2024

## 2024-11-07 NOTE — PROGRESS NOTES
[facility]     LOS: 6 days     Subjective:    CC:No chief complaint on file.    DIET:  Diet Order   Procedures    Diet: Regular/House; Feeding Assistance - Nursing; Texture: Pureed (NDD 1); Fluid Consistency: Honey Thick       Objective:    Vital Signs:  Temp:  [97 °F (36.1 °C)-98 °F (36.7 °C)] 97 °F (36.1 °C)  Heart Rate:  [30-80] 67  Resp:  [16-18] 18  BP: ()/(42-64) 121/44    SpO2:  [99 %-100 %] 100 %  on  Flow (L/min) (Oxygen Therapy):  [2-3] 2;   Device (Oxygen Therapy): nasal cannula  Body mass index is 26.06 kg/m².    Physical Exam  Constitutional:       General: He is not in acute distress.     Comments: Patient is very weak today, may be following some commands as far as moving his feet when asked.  Occasionally will wake up and look at me.  Very minimal verbal communication today.   Pulmonary:      Effort: Pulmonary effort is normal.      Breath sounds: Normal breath sounds.   Abdominal:      General: There is no distension.      Palpations: Abdomen is soft.      Tenderness: There is no abdominal tenderness.   Skin:     General: Skin is warm and dry.         Results Review:    Glucose   Date Value Ref Range Status   11/07/2024 180 (H) 65 - 99 mg/dL Final   11/06/2024 156 (H) 65 - 99 mg/dL Final   11/05/2024 161 (H) 65 - 99 mg/dL Final     Results from last 7 days   Lab Units 11/07/24  0445   WBC 10*3/mm3 5.72   HEMOGLOBIN g/dL 8.0*   HEMATOCRIT % 27.2*   PLATELETS 10*3/mm3 143     Results from last 7 days   Lab Units 11/07/24  0631 11/06/24  0527   SODIUM mmol/L 132* 135*   POTASSIUM mmol/L 3.2* 3.5   CHLORIDE mmol/L 99 102   CO2 mmol/L 25.3 21.3*   BUN mg/dL 24* 38*   CREATININE mg/dL 3.99* 7.39*   CALCIUM mg/dL 8.5* 9.0   BILIRUBIN mg/dL  --  0.4   ALK PHOS U/L  --  70   ALT (SGPT) U/L  --  <5   AST (SGOT) U/L  --  19   GLUCOSE mg/dL 180* 156*         Results from last 7 days   Lab Units 11/06/24  0527   MAGNESIUM mg/dL 2.3         Cultures:  Blood Culture   Date Value  Ref Range Status   11/02/2024 No growth at 4 days  Preliminary   11/02/2024 No growth at 4 days  Preliminary       I have reviewed daily medications and changes in CPOE    Scheduled meds  acetaminophen, 1,000 mg, Nasogastric, Q8H  amitriptyline, 10 mg, Oral, Nightly  arformoterol, 15 mcg, Nebulization, BID - RT  ARIPiprazole, 2 mg, Oral, BID  atorvastatin, 40 mg, Oral, Daily  budesonide, 0.5 mg, Nebulization, BID - RT  Check Fentanyl Patch Placement, 1 each, Does not apply, Q12H  gabapentin, 100 mg, Oral, Nightly  heparin (porcine), 5,000 Units, Subcutaneous, Q8H  insulin regular, 2-7 Units, Subcutaneous, Q6H  levothyroxine, 175 mcg, Nasogastric, Q AM  Lidocaine, 1 patch, Transdermal, Q24H  midodrine, 10 mg, Oral, TID AC  pantoprazole, 40 mg, Intravenous, BID AC  phosphorus, 500 mg, Nasogastric, 4x Daily  risperiDONE, 2 mg, Oral, Daily With Breakfast & Dinner  senna-docusate sodium, 2 tablet, Nasogastric, Daily  sertraline, 100 mg, Oral, Nightly  sodium chloride, 10 mL, Intravenous, Q12H  torsemide, 100 mg, Oral, Daily  Vancomycin Pharmacy Intermittent/Pulse Dosing, , Does not apply, Daily        Pharmacy to dose vancomycin,       PRN meds    [DISCONTINUED] senna-docusate sodium **AND** polyethylene glycol **AND** bisacodyl **AND** bisacodyl    cloNIDine    dextrose    dextrose    famotidine    glucagon (human recombinant)    heparin (porcine)    ipratropium-albuterol    labetalol    melatonin    nitroglycerin    OLANZapine    ondansetron    oxyCODONE    Pharmacy to dose vancomycin    sodium chloride    sodium chloride        Discitis of lumbar region    Essential hypertension    Anemia due to chronic kidney disease    Type 2 diabetes mellitus without complication    Bipolar disorder    Chronic respiratory failure with hypoxia    ESRD on dialysis    Discitis    Metabolic encephalopathy    Aspiration pneumonia    Severe malnutrition        Assessment/Plan:         Coag negative staph acteremia  L1/2 discitis , T5/6  "discitis.   -Blood cultures 10/31/2024 positive for coag negative staph  -IV ceftriaxone discontinued, IV vancomycin continued per ID  -MRI of the cervical spine/thoracic/lumbar-limited studies but thought to have L1/2 discitis along with T5/6 discitis.   -TDC catheter removed 11/3/2024 as likely source of infection.  Vascular surgery managing  -Neurosurgery evaluated, no intervention planned, plan for repeat MRI of lumbar and thoracic spine with and without contrast in 3 months.  -Echocardiogram 11/4/24-normal left ventricular function, EF 56 to 60%, no findings of vegetation/infection  ID evaluated and recommended \"vancomycin dosing per pharmacy with dialysis only x 6 weeks.  Antibiotic stop date December 14  Please monitor weekly CBC with differential and vancomycin troughs and fax the results to the infectious disease clinic at [phone]  We will arrange for ID follow-up on December 13\"  -Continue IV vancomycin with dialysis inpatient  -Hold Dilaudid for now due to encephalopathy  -Scheduled bowel regimen, hold for loose stools     Hypotension  -Increase midodrine to 10 mg 3 times daily 11/6     Metabolic encephalopathy  Hallucinations  Bipolar disorder  -CT head 10/8/2024 with no acute findings  -Psychiatry following.  On as needed Zyprexa.  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 every 6 hours, stop amitriptyline      hypoxemia  Doubt pneumonia --- with normal white blood cell count of 5.7 and no change in differential, and completely normal temperature curve  COPD  -CT scan 10/41/24 mild patchy and consolidative airspace opacities in the right lower lobe with small adjacent pleural effusion new from previous comparison. Differential includes " atelectasis versus early developing infection.  -Continue arformoterol, budesonide nebulizer     ESRD on HD  -has been on home hemodialysis receiving 5 times a week   -Nephrology managing dialysis  -TDC catheter removed 11/3/2024 per vascular as likely source of infection.  Currently on dialysis holiday  -vascular surgery evaluated viability of his left AV fistula,Fistula based upon duplex criteria appears mature and able to be utilized.  Plan to attempt follow-up cannulization, however if fails due to technical issues/agitation, will need repeat TDC catheter placement.     Gastritis  Abdominal pain  -EGD 10/28/2024 showed gastritis  -IV PPI twice daily     Hypothyroidism  -Continue levothyroxine 175 mcg daily     Essential hypertension  -BP previously low and now normalizing, hold carvedilol      Diabetes mellitus  -SSI.  Hypoglycemic protocol     Anemia of chronic disease  -Hemoglobin stable around 8.  -Monitor and transfuse as indicated for symptomatic anemia or hemoglobin less than 7      Donovan Gonzalez MD  11/07/24  10:22 EST

## 2024-11-07 NOTE — PLAN OF CARE
Goal Outcome Evaluation:         Pt disoriented, alert to self, SR, 2-3LNC, TF infusing @ goal, pt tolerating advancement in diet to pureed with HTL, plans for video swallow tomorrow or Friday, IV ABX administered, limb precautions maintained, family @ bedside, HD completed, will continue to monitor

## 2024-11-07 NOTE — PLAN OF CARE
Goal Outcome Evaluation:  Plan of Care Reviewed With: patient        Progress: improving  Outcome Evaluation: SR, RA, A/Ox4. TF infusing at goal rate via Coretrack. HD ordered for 11/8 per MD orders. K phos neutral added per MD orders. d/c Coreg, Amitryptyline, and Gabapentin per MD orders. Discharge planning to be evaluated pending medical clearance.

## 2024-11-07 NOTE — PLAN OF CARE
Goal Outcome Evaluation:  Plan of Care Reviewed With: patient        Progress: improving  Outcome Evaluation: Pt seen for PT tx this AM. Pt continues to progress with mobility. Pt reports increased back pain with mobility more today than yesterday. Pt completed supine to sit with MaxA of 2 and completed 3 stands from EOB. Pt ModAX2 with initial stand but progressed with each stand. Pt able to stand fully upright and MinAX2 with last stand. Pt took 4 lateral side steps then 2 sides steps with ModAX2. Pt has decreased coordination left LE > than right LE when side stepping. Pt returned to supine with ModAX2. Will continue to follow pt for d/c needs.

## 2024-11-07 NOTE — PROGRESS NOTES
[facility]   Surgical Progress Note    Patient Name: Nelson Wang  : 1945  MRN: [id number]  Date of admission: 2024  Surgical Procedures Since Admission:    Subjective   Subjective     Chief Complaint: Postdialysis catheter removal follow-up.    History of Present Illness   Resting comfortably.      Review of Systems    Objective   Objective     Vitals:   Temp:  [97 °F (36.1 °C)-98 °F (36.7 °C)] 97 °F (36.1 °C)  Heart Rate:  [30-80] 67  Resp:  [16-18] 18  BP: ()/(42-64) 121/44  Flow (L/min) (Oxygen Therapy):  [2-3] 3    Physical Exam  Constitutional:       Appearance: He is well-developed.   Pulmonary:      Effort: Pulmonary effort is normal. No respiratory distress.   Abdominal:      General: There is no distension.      Palpations: Abdomen is soft.   Neurological:      Mental Status: He is alert and oriented to person, place, and time.           Result Review    Result Review:  I have personally reviewed the results from the time of this admission to 2024 09:49 EST and agree with these findings:  [x]  Laboratory list / accordion  []  Microbiology  []  Radiology  []  EKG/Telemetry   []  Cardiology/Vascular   []  Pathology  []  Old records  []  Other:  Most notable findings include:     White count 5.7 hemoglobin 8.0.    Assessment & Plan   Assessment / Plan     Brief Patient Summary:  Nelson Wang is a 79 y.o. male who likely infected tunneled catheter with discitis.    Active Hospital Problems:   Active Hospital Problems    Diagnosis     **Discitis of lumbar region     Severe malnutrition     Discitis     Metabolic encephalopathy     Aspiration pneumonia     ESRD on dialysis     Bipolar disorder     Chronic respiratory failure with hypoxia     Essential hypertension     Anemia due to chronic kidney disease     Type 2 diabetes mellitus without complication      Plan:   11/3/2024: Dialysis catheter removed following dialysis.    2024: No urgent need for dialysis.  I have ordered  a fistula duplex of the left arm fistula.    11/5/2024: Fistula duplex reviewed.  Fistula based upon duplex criteria appears mature and able to be utilized.  Case discussed with nephrology this morning.  Concerns are for his agitation would prohibit him from allowing access of his left arm fistula.  After discussion with nephrology will allow attempted cannulization.  If this fails due to technical issues or agitation related issues would require tunneled catheter later this week.    11/6/2024: Will follow-up tomorrow and see if patient was able to tolerate dialysis through left arm fistula.    11/7/2024: Per report patient use dialysis fistula yesterday without concerns.  Vascular surgery will sign off of the available for further questions as they arise.  Can follow-up with his vascular surgeon in [place].      VTE Prophylaxis:  Pharmacologic & mechanical VTE prophylaxis orders are present.        Marquez Zaman MD

## 2024-11-08 ENCOUNTER — APPOINTMENT (OUTPATIENT)
Dept: GENERAL RADIOLOGY | Facility: HOSPITAL | Age: 79
DRG: 551 | End: 2024-11-08
Payer: MEDICARE

## 2024-11-08 LAB
ALBUMIN SERPL-MCNC: 2.9 G/DL (ref 3.5–5.2)
ANION GAP SERPL CALCULATED.3IONS-SCNC: 12 MMOL/L (ref 5–15)
BASOPHILS # BLD AUTO: 0.03 10*3/MM3 (ref 0–0.2)
BASOPHILS NFR BLD AUTO: 0.4 % (ref 0–1.5)
BUN SERPL-MCNC: 47 MG/DL (ref 8–23)
BUN/CREAT SERPL: 8.7 (ref 7–25)
CALCIUM SPEC-SCNC: 8.4 MG/DL (ref 8.6–10.5)
CHLORIDE SERPL-SCNC: 98 MMOL/L (ref 98–107)
CO2 SERPL-SCNC: 22 MMOL/L (ref 22–29)
CREAT SERPL-MCNC: 5.42 MG/DL (ref 0.76–1.27)
DEPRECATED RDW RBC AUTO: 49.5 FL (ref 37–54)
EGFRCR SERPLBLD CKD-EPI 2021: 10.1 ML/MIN/1.73
EOSINOPHIL # BLD AUTO: 0.14 10*3/MM3 (ref 0–0.4)
EOSINOPHIL NFR BLD AUTO: 1.9 % (ref 0.3–6.2)
ERYTHROCYTE [DISTWIDTH] IN BLOOD BY AUTOMATED COUNT: 14.7 % (ref 12.3–15.4)
GLUCOSE BLDC GLUCOMTR-MCNC: 147 MG/DL (ref 70–130)
GLUCOSE BLDC GLUCOMTR-MCNC: 150 MG/DL (ref 70–130)
GLUCOSE BLDC GLUCOMTR-MCNC: 183 MG/DL (ref 70–130)
GLUCOSE BLDC GLUCOMTR-MCNC: 189 MG/DL (ref 70–130)
GLUCOSE BLDC GLUCOMTR-MCNC: 218 MG/DL (ref 70–130)
GLUCOSE SERPL-MCNC: 166 MG/DL (ref 65–99)
HCT VFR BLD AUTO: 26.8 % (ref 37.5–51)
HGB BLD-MCNC: 8.3 G/DL (ref 13–17.7)
IMM GRANULOCYTES # BLD AUTO: 0.09 10*3/MM3 (ref 0–0.05)
IMM GRANULOCYTES NFR BLD AUTO: 1.2 % (ref 0–0.5)
LYMPHOCYTES # BLD AUTO: 1.41 10*3/MM3 (ref 0.7–3.1)
LYMPHOCYTES NFR BLD AUTO: 19.5 % (ref 19.6–45.3)
MCH RBC QN AUTO: 28.9 PG (ref 26.6–33)
MCHC RBC AUTO-ENTMCNC: 31 G/DL (ref 31.5–35.7)
MCV RBC AUTO: 93.4 FL (ref 79–97)
MONOCYTES # BLD AUTO: 0.74 10*3/MM3 (ref 0.1–0.9)
MONOCYTES NFR BLD AUTO: 10.2 % (ref 5–12)
NEUTROPHILS NFR BLD AUTO: 4.82 10*3/MM3 (ref 1.7–7)
NEUTROPHILS NFR BLD AUTO: 66.8 % (ref 42.7–76)
NRBC BLD AUTO-RTO: 0 /100 WBC (ref 0–0.2)
PHOSPHATE SERPL-MCNC: 1 MG/DL (ref 2.5–4.5)
PLATELET # BLD AUTO: 142 10*3/MM3 (ref 140–450)
PMV BLD AUTO: 10.5 FL (ref 6–12)
POTASSIUM SERPL-SCNC: 3.7 MMOL/L (ref 3.5–5.2)
RBC # BLD AUTO: 2.87 10*6/MM3 (ref 4.14–5.8)
SODIUM SERPL-SCNC: 132 MMOL/L (ref 136–145)
VANCOMYCIN SERPL-MCNC: 17.3 MCG/ML (ref 5–40)
WBC NRBC COR # BLD AUTO: 7.23 10*3/MM3 (ref 3.4–10.8)

## 2024-11-08 PROCEDURE — 97530 THERAPEUTIC ACTIVITIES: CPT

## 2024-11-08 PROCEDURE — 94761 N-INVAS EAR/PLS OXIMETRY MLT: CPT

## 2024-11-08 PROCEDURE — 25010000002 VANCOMYCIN 750 MG RECONSTITUTED SOLUTION 1 EACH VIAL: Performed by: NURSE PRACTITIONER

## 2024-11-08 PROCEDURE — 63710000001 INSULIN REGULAR HUMAN PER 5 UNITS: Performed by: NURSE PRACTITIONER

## 2024-11-08 PROCEDURE — 25010000002 HEPARIN (PORCINE) PER 1000 UNITS: Performed by: STUDENT IN AN ORGANIZED HEALTH CARE EDUCATION/TRAINING PROGRAM

## 2024-11-08 PROCEDURE — 80202 ASSAY OF VANCOMYCIN: CPT | Performed by: INTERNAL MEDICINE

## 2024-11-08 PROCEDURE — 85025 COMPLETE CBC W/AUTO DIFF WBC: CPT | Performed by: INTERNAL MEDICINE

## 2024-11-08 PROCEDURE — 25810000003 SODIUM CHLORIDE 0.9 % SOLUTION 250 ML FLEX CONT: Performed by: NURSE PRACTITIONER

## 2024-11-08 PROCEDURE — 97116 GAIT TRAINING THERAPY: CPT

## 2024-11-08 PROCEDURE — 94799 UNLISTED PULMONARY SVC/PX: CPT

## 2024-11-08 PROCEDURE — 94664 DEMO&/EVAL PT USE INHALER: CPT

## 2024-11-08 PROCEDURE — 92611 MOTION FLUOROSCOPY/SWALLOW: CPT

## 2024-11-08 PROCEDURE — 80069 RENAL FUNCTION PANEL: CPT | Performed by: INTERNAL MEDICINE

## 2024-11-08 PROCEDURE — 74230 X-RAY XM SWLNG FUNCJ C+: CPT

## 2024-11-08 PROCEDURE — 82948 REAGENT STRIP/BLOOD GLUCOSE: CPT

## 2024-11-08 RX ADMIN — ACETAMINOPHEN 1000 MG: 500 TABLET ORAL at 00:16

## 2024-11-08 RX ADMIN — TORSEMIDE 100 MG: 100 TABLET ORAL at 11:08

## 2024-11-08 RX ADMIN — BUDESONIDE 0.5 MG: 0.5 INHALANT ORAL at 07:38

## 2024-11-08 RX ADMIN — BARIUM SULFATE 4 ML: 980 POWDER, FOR SUSPENSION ORAL at 10:07

## 2024-11-08 RX ADMIN — ARIPIPRAZOLE 2 MG: 2 TABLET ORAL at 21:55

## 2024-11-08 RX ADMIN — BARIUM SULFATE 50 ML: 400 SUSPENSION ORAL at 10:07

## 2024-11-08 RX ADMIN — INSULIN HUMAN 3 UNITS: 100 INJECTION, SOLUTION PARENTERAL at 12:45

## 2024-11-08 RX ADMIN — ATORVASTATIN CALCIUM 40 MG: 20 TABLET, FILM COATED ORAL at 11:09

## 2024-11-08 RX ADMIN — PANTOPRAZOLE SODIUM 40 MG: 40 INJECTION, POWDER, FOR SOLUTION INTRAVENOUS at 17:53

## 2024-11-08 RX ADMIN — Medication 10 ML: at 21:56

## 2024-11-08 RX ADMIN — ARFORMOTEROL TARTRATE 15 MCG: 15 SOLUTION RESPIRATORY (INHALATION) at 07:38

## 2024-11-08 RX ADMIN — DIBASIC SODIUM PHOSPHATE, MONOBASIC POTASSIUM PHOSPHATE AND MONOBASIC SODIUM PHOSPHATE 2 TABLET: 852; 155; 130 TABLET ORAL at 21:55

## 2024-11-08 RX ADMIN — HEPARIN SODIUM 5000 UNITS: 5000 INJECTION INTRAVENOUS; SUBCUTANEOUS at 06:19

## 2024-11-08 RX ADMIN — HEPARIN SODIUM 5000 UNITS: 5000 INJECTION INTRAVENOUS; SUBCUTANEOUS at 21:55

## 2024-11-08 RX ADMIN — Medication: at 11:10

## 2024-11-08 RX ADMIN — ARFORMOTEROL TARTRATE 15 MCG: 15 SOLUTION RESPIRATORY (INHALATION) at 19:53

## 2024-11-08 RX ADMIN — MIDODRINE HYDROCHLORIDE 10 MG: 5 TABLET ORAL at 12:46

## 2024-11-08 RX ADMIN — BUDESONIDE 0.5 MG: 0.5 INHALANT ORAL at 19:56

## 2024-11-08 RX ADMIN — ARIPIPRAZOLE 2 MG: 2 TABLET ORAL at 11:09

## 2024-11-08 RX ADMIN — SERTRALINE 100 MG: 100 TABLET, FILM COATED ORAL at 21:55

## 2024-11-08 RX ADMIN — HEPARIN SODIUM 5000 UNITS: 5000 INJECTION INTRAVENOUS; SUBCUTANEOUS at 15:32

## 2024-11-08 RX ADMIN — POLYETHYLENE GLYCOL 3350 17 G: 17 POWDER, FOR SOLUTION ORAL at 00:16

## 2024-11-08 RX ADMIN — INSULIN HUMAN 2 UNITS: 100 INJECTION, SOLUTION PARENTERAL at 17:53

## 2024-11-08 RX ADMIN — LIDOCAINE 1 PATCH: 4 PATCH TOPICAL at 11:10

## 2024-11-08 RX ADMIN — ACETAMINOPHEN 1000 MG: 500 TABLET ORAL at 11:09

## 2024-11-08 RX ADMIN — BARIUM SULFATE 5 ML: 400 PASTE ORAL at 10:07

## 2024-11-08 RX ADMIN — INSULIN HUMAN 2 UNITS: 100 INJECTION, SOLUTION PARENTERAL at 00:16

## 2024-11-08 RX ADMIN — Medication 10 ML: at 11:10

## 2024-11-08 RX ADMIN — BARIUM SULFATE 55 ML: 0.81 POWDER, FOR SUSPENSION ORAL at 10:07

## 2024-11-08 RX ADMIN — LEVOTHYROXINE SODIUM 175 MCG: 175 TABLET ORAL at 06:19

## 2024-11-08 RX ADMIN — INSULIN HUMAN 2 UNITS: 100 INJECTION, SOLUTION PARENTERAL at 06:36

## 2024-11-08 RX ADMIN — DIBASIC SODIUM PHOSPHATE, MONOBASIC POTASSIUM PHOSPHATE AND MONOBASIC SODIUM PHOSPHATE 2 TABLET: 852; 155; 130 TABLET ORAL at 11:08

## 2024-11-08 RX ADMIN — SENNOSIDES AND DOCUSATE SODIUM 2 TABLET: 50; 8.6 TABLET ORAL at 11:09

## 2024-11-08 RX ADMIN — ACETAMINOPHEN 1000 MG: 500 TABLET ORAL at 17:53

## 2024-11-08 RX ADMIN — PANTOPRAZOLE SODIUM 40 MG: 40 INJECTION, POWDER, FOR SOLUTION INTRAVENOUS at 06:19

## 2024-11-08 RX ADMIN — VANCOMYCIN HYDROCHLORIDE 750 MG: 750 INJECTION, POWDER, LYOPHILIZED, FOR SOLUTION INTRAVENOUS at 15:32

## 2024-11-08 NOTE — PROGRESS NOTES
[facility] Progress Note      Patient Name: Nelson Wang  : 1945  MRN: [id number]  Primary Care Physician:  Domingo Bravo MD  Date of admission: 2024    Subjective     Interval History:   F/u ESRD     Sitting up in the chair much more awake and alert tolerating his tube feed, less back pain he still have some confusion but overall much improved  Review of Systems:   As noted above    Objective     Vitals:   Temp:  [98.6 °F (37 °C)-98.8 °F (37.1 °C)] 98.6 °F (37 °C)  Heart Rate:  [81-86] 86  Resp:  [16-18] 18  BP: (117-184)/(46-94) 184/61  Flow (L/min) (Oxygen Therapy):  [1.5] 1.5    Intake/Output Summary (Last 24 hours) at 2024 1209  Last data filed at 2024 0640  Gross per 24 hour   Intake 1291 ml   Output --   Net 1291 ml       Physical Exam:    General Appearance: Chronically ill, awake, frail  HEENT: Core track in place  Neck: No JVD  Lungs: Scattered rhonchi, unlabored breathing  Heart: RRR, no rub  Abdomen: soft, nontender, nondistended  Extremities: no edema, LUE AVF with thrill and bruit  Neuro: Moving all extremities speech improved    Scheduled Meds:     acetaminophen, 1,000 mg, Nasogastric, Q8H  arformoterol, 15 mcg, Nebulization, BID - RT  ARIPiprazole, 2 mg, Oral, BID  atorvastatin, 40 mg, Oral, Daily  budesonide, 0.5 mg, Nebulization, BID - RT  Check Fentanyl Patch Placement, 1 each, Does not apply, Q12H  heparin (porcine), 5,000 Units, Subcutaneous, Q8H  insulin regular, 2-7 Units, Subcutaneous, Q6H  levothyroxine, 175 mcg, Nasogastric, Q AM  Lidocaine, 1 patch, Transdermal, Q24H  midodrine, 10 mg, Oral, TID AC  pantoprazole, 40 mg, Intravenous, BID AC  phosphorus, 500 mg, Nasogastric, 4x Daily  senna-docusate sodium, 2 tablet, Nasogastric, Daily  sertraline, 100 mg, Oral, Nightly  sodium chloride, 10 mL, Intravenous, Q12H  torsemide, 100 mg, Oral, Daily  vancomycin, 750 mg, Intravenous, Once  Vancomycin Pharmacy Intermittent/Pulse Dosing, ,  Does not apply, Daily      IV Meds:   Pharmacy to dose vancomycin,         Results Reviewed:   I have personally reviewed the results from the time of this admission to 11/8/2024 12:09 EST     Results from last 7 days   Lab Units 11/08/24  0534 11/07/24  0631 11/06/24  0527   SODIUM mmol/L 132* 132* 135*   POTASSIUM mmol/L 3.7 3.2* 3.5   CHLORIDE mmol/L 98 99 102   CO2 mmol/L 22.0 25.3 21.3*   BUN mg/dL 47* 24* 38*   CREATININE mg/dL 5.42* 3.99* 7.39*   CALCIUM mg/dL 8.4* 8.5* 9.0   BILIRUBIN mg/dL  --   --  0.4   ALK PHOS U/L  --   --  70   ALT (SGPT) U/L  --   --  <5   AST (SGOT) U/L  --   --  19   GLUCOSE mg/dL 166* 180* 156*     Estimated Creatinine Clearance: 11.6 mL/min (A) (by C-G formula based on SCr of 5.42 mg/dL (H)).  Results from last 7 days   Lab Units 11/08/24  0534 11/07/24  0631 11/06/24  0527 11/05/24  0556 11/03/24  0531   MAGNESIUM mg/dL  --   --  2.3  --  2.2   PHOSPHORUS mg/dL 1.0* 1.0* 2.1*   < >  --     < > = values in this interval not displayed.         Results from last 7 days   Lab Units 11/08/24  0534 11/07/24  0445 11/06/24  0527 11/05/24  0556 11/04/24  0349   WBC 10*3/mm3 7.23 5.72 5.82 4.15 6.86   HEMOGLOBIN g/dL 8.3* 8.0* 8.4* 8.5* 9.3*   PLATELETS 10*3/mm3 142 143 138* 126* 139*           Assessment / Plan     ASSESSMENT:  End-stage renal disease - been on home hemodialysis receiving 5 times a week (Dr Castro follows), via [facility] due to LUE AVF dysfunction (placed in July).  Catheter was removed, his volume status appears to be euvolemic, his electrolyte within acceptable range other than sodium 132 and phosphorus is 1 plan for dialysis today, using AV fistula   anemia of chronic kidney disease on long-acting RONALD his hemoglobin today is 8.3, platelet 142,000 slightly decreased  Hypertension with chronic kidney disease controlled.  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  Discussed the case with the patient's daughter and his wife at the bedside.  Copied text in this note has been reviewed and is accurate as of 11/08/24.           Bart Wolf MD  11/08/24  12:09 [facility]    [facility]  [phone]

## 2024-11-08 NOTE — PROGRESS NOTES
Family reports ongoing gradual improvement.  The patient is up in a chair today and is more responsive and cheerful.  His appetite has improved.  Continuing current treatment.

## 2024-11-08 NOTE — PLAN OF CARE
Goal Outcome Evaluation:        Problem: Adult Inpatient Plan of Care  Goal: Plan of Care Review  Outcome: Progressing      VSS, NSR w/ 1AVB. 1.5L NC O2. AxO x1, only to self, but alert; disoriented x3 this shift. Q2 turn. L AV fistula thrill & bruit present, CDI. L Coretrack @ 65 cm, w/ Novasource TF @ 40 mL/hr, water flush @ 30 mL/4 hr. Meds crushed through tube. Pureed, HTL diet w/ feed assist. Q6 accucheck. IV vanc antibx. PRN miralax x1. Brief on. X1 void & x1 lrg BM this shift. Daughter @ bedside. Plans for VFSS & dialysis today.

## 2024-11-08 NOTE — THERAPY TREATMENT NOTE
Patient Name: Nelson Wang  : 1945    MRN: [id number]                              Today's Date: 2024       Admit Date: 2024    Visit Dx: No diagnosis found.  Patient Active Problem List   Diagnosis    Essential hypertension    Bradycardia, sinus    Anemia due to chronic kidney disease    Hypothyroidism    Idiopathic gout    Proteinuria    Psoriasis    Thrombocytopenia    Type 2 diabetes mellitus without complication    CKD (chronic kidney disease), stage IV    Hyperlipidemia    Monoclonal gammopathy of unknown significance (MGUS)    Stage 5 chronic kidney disease    Chronic gout without tophus    Peritoneal dialysis catheter dysfunction    Medicare annual wellness visit, subsequent    Chronic cough    Peritonitis associated with peritoneal dialysis    Recurrent pneumonia    Acute on chronic respiratory failure with hypoxia    End stage renal disease    Aspiration pneumonitis    Sepsis    Type 2 diabetes mellitus, with long-term current use of insulin    GERD without esophagitis    Immunosuppression due to drug therapy    Bipolar disorder    Chronic respiratory failure with hypoxia    Shortness of breath    Abnormal stress test    ESRD on dialysis    Abnormal chest CT    Encounter for screening for malignant neoplasm of colon    History of colon polyps    Family history of colon cancer    Intractable pain    Abdominal pain    ESRD (end stage renal disease) on dialysis    AMS (altered mental status)    Hallucinations    LUQ pain    Discitis    Metabolic encephalopathy    Aspiration pneumonia    Discitis of lumbar region    Severe malnutrition     Past Medical History:   Diagnosis Date    Abnormal stress test     Anemia     IRON INFUSIONS WITH DIALYSIS    Anesthesia     WITH HIP REPLACEMENT DAUGHTER BELIEVES HAD SVT     Ankle pain, right     Anxiety and depression     CKD (chronic kidney disease), stage IV     DIALYSIS [id number] SHILEY IN RIGHT CHEST    Diabetes     Gout     High  cholesterol     History of peritoneal dialysis     HL (hearing loss)     Hyperkalemia     Hypertension     Hypothyroidism     Insomnia     Night terrors     Psoriasis     Psoriasis     Sleep walking     Thrombocytopenia     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    Vitiligo      Past Surgical History:   Procedure Laterality Date    ANKLE SURGERY Right 11/1990    APPENDECTOMY N/A 1954    ARTERIOVENOUS FISTULA/SHUNT SURGERY Left 07/16/2024    Procedure: Creation of left arm arteriovenous fistula;  Surgeon: Moses Mir MD;  Location: [facility] MAIN OR;  Service: Vascular;  Laterality: Left;    BRONCHOSCOPY N/A 03/01/2024    Procedure: BRONCHOSCOPY WITH BAL AND WASHINGS;  Surgeon: Sandra Espinal MD;  Location: [facility] ENDOSCOPY;  Service: Pulmonary;  Laterality: N/A;  PNEUMONIA    BRONCHOSCOPY N/A 08/30/2024    Procedure: BRONCHOSCOPY WITH BALS AND WASHINGS;  Surgeon: Sandra Espinal MD;  Location: [facility] ENDOSCOPY;  Service: Pulmonary;  Laterality: N/A;  MUCOUS PLUGGING    CARDIAC CATHETERIZATION N/A 05/29/2024    Procedure: Left Heart Cath with possible coronary angioplasty;  Surgeon: Stephan Nichols MD;  Location: [facility] CATH INVASIVE LOCATION;  Service: Cardiology;  Laterality: N/A;    COLONOSCOPY N/A 06/2022    [facility]    ENDOSCOPY N/A 10/28/2024    Procedure: ESOPHAGOGASTRODUODENOSCOPY INSERTION OF LIGHTED INSTRUMENT TO VIEW ESOPHAGUS, STOMACH AND SMALL INTESTINE;  Surgeon: Kingsley Peraza MD;  Location: [facility] ENDOSCOPY;  Service: Gastroenterology;  Laterality: N/A;  Gastritis    HIP BIPOLAR REPLACEMENT Right 01/2000    INSERTION HEMODIALYSIS CATHETER Left 04/09/2024    Procedure: HEMODIALYSIS CATHETER INSERTION;  Surgeon: Jose Berry MD;  Location: [facility] OR;  Service: General;  Laterality: Left;    INSERTION HEMODIALYSIS CATHETER N/A 04/12/2024    Procedure: Tunneled hemodialysis catheter insertion;  Surgeon: Enrique Vinson MD;  Location: [facility] OR;  Service: Vascular;   Laterality: N/A;    INSERTION PERITONEAL DIALYSIS CATHETER N/A 03/27/2023    Procedure: LAPAROSCOPIC INSERTION PERITONEAL DIALYSIS CATHETER, LAPAROSCOPIC OMENTOPEXY WITH LYSIS OF ADHESIONS;  Surgeon: Jose Berry MD;  Location: [facility] MAIN OR;  Service: General;  Laterality: N/A;    INSERTION PERITONEAL DIALYSIS CATHETER Left 07/23/2023    Procedure: REVISION OF PERITONEAL DIALYSIS CATHETER;  Surgeon: Radha Oreilly MD;  Location: [facility] MAIN OR;  Service: General;  Laterality: Left;    REMOVAL PERITONEAL DIALYSIS CATHETER N/A 04/09/2024    Procedure: REMOVAL PERITONEAL DIALYSIS CATHETER;  Surgeon: Jose Berry MD;  Location: [facility] MAIN OR;  Service: General;  Laterality: N/A;    RENAL BIOPSY Left 07/15/2022    UPPER GASTROINTESTINAL ENDOSCOPY      WRIST SURGERY      UNSURE WHICH SIDE DAUGHTER REPORTS HAD SEVERE  CUT FROM WINDOW AND THEY HAD TO DO RECONSTRUCTIVE SURGERY WITH VESSELS AND NERVES      General Information       Row Name 11/08/24 1013          Physical Therapy Time and Intention    Document Type therapy note (daily note)  -EB     Mode of Treatment physical therapy  -EB       Row Name 11/08/24 1013          General Information    Patient Profile Reviewed yes  -EB     Existing Precautions/Restrictions fall  -EB       Row Name 11/08/24 1013          Cognition    Orientation Status (Cognition) oriented to;person;place  -EB       Row Name 11/08/24 1013          Safety Issues/Impairments Affecting Functional Mobility    Impairments Affecting Function (Mobility) balance;endurance/activity tolerance;strength;range of motion (ROM)  -EB               User Key  (r) = Recorded By, (t) = Taken By, (c) = Cosigned By      Initials Name Provider Type    EB Gema Crespo PTA Physical Therapist Assistant                   Mobility       Row Name 11/08/24 1013          Bed Mobility    Supine-Sit [place] (Bed Mobility) minimum assist (75% patient effort);2 person assist;verbal cues  -EB      Sit-Supine [place] (Bed Mobility) not tested  -EB     Assistive Device (Bed Mobility) bed rails;head of bed elevated  -EB     Comment, (Bed Mobility) pt with more initiation with bed mobility.  -EB       Row Name 11/08/24 1013          Sit-Stand Transfer    Sit-Stand [place] (Transfers) minimum assist (75% patient effort);2 person assist  -EB     Assistive Device (Sit-Stand Transfers) --  HHAX2  -EB       Row Name 11/08/24 1013          Gait/Stairs (Locomotion)    [place] Level (Gait) minimum assist (75% patient effort);2 person assist  -EB     Assistive Device (Gait) --  HHAX2  -EB     Distance in Feet (Gait) 5  -EB     Deviations/Abnormal Patterns (Gait) gait speed decreased;m[place]uer[place] decreased;stride length decreased  -EB     Bilateral Gait Deviations forward flexed posture  -EB     Comment, (Gait/Stairs) a little unsteady, no major LOB. Pt fatigues quickly.  -EB               User Key  (r) = Recorded By, (t) = Taken By, (c) = Cosigned By      Initials Name Provider Type    Gema Garcia PTA Physical Therapist Assistant                   Obj/Interventions       Row Name 11/08/24 1015          Motor Skills    Therapeutic Exercise --  BLE:: LAQs and seated marches (X10)  -EB               User Key  (r) = Recorded By, (t) = Taken By, (c) = Cosigned By      Initials Name Provider Type    Gema Garcia PTA Physical Therapist Assistant                   Goals/Plan    No documentation.                  Clinical Impression       Row Name 11/08/24 1016          Pain    Pre/Posttreatment Pain Comment no c/o pain today.  -EB       Row Name 11/08/24 1016          Plan of Care Review    Plan of Care Reviewed With patient  -EB     Progress improving  -EB     Outcome Evaluation Pt seen for PT tx this AM. Pt agreeable to participate, pt more responsive, joking with daughter and smiling today. Pt also improved with initiation of mobility and required less assist. Pt completed supine to sit with MinAX2 and  stood from EOB with MinAX2. Pt was able to self correct forward flexed posture without cues. Pt ambulated about 5ft with MinAX2/HHAx2. Pt up to the chair and completed ilia LE exercises. Encouraged pt and nsg staff to get up to the chair multiple times a day especially with meals. Will continue to follow and progress pt as able.  -EB       Row Name 11/08/24 1016          Therapy Assessment/Plan (PT)    Therapy Frequency (PT) 5 times/wk  -       Row Name 11/08/24 1016          Positioning and Restraints    Pre-Treatment Position in bed  -EB     Post Treatment Position chair  -EB     In Chair reclined;call light within reach;encouraged to call for assist;exit alarm on;with family/caregiver;notified nsg  -               User Key  (r) = Recorded By, (t) = Taken By, (c) = Cosigned By      Initials Name Provider Type    Gema Garcia PTA Physical Therapist Assistant                   Outcome Measures       Row Name 11/08/24 1015 11/08/24 0020       How much help from another person do you currently need...    Turning from your back to your side while in flat bed without using bedrails? 3  -EB 3  -LO    Moving from lying on back to sitting on the side of a flat bed without bedrails? 2  -EB 2  -LO    Moving to and from a bed to a chair (including a wheelchair)? 3  -EB 2  -LO    Standing up from a chair using your arms (e.g., wheelchair, bedside chair)? 3  -EB 2  -LO    Climbing 3-5 steps with a railing? 1  -EB 1  -LO    To walk in hospital room? 2  -EB 1  -LO    AM-PAC 6 Clicks Score (PT) 14  -EB 11  -LO              User Key  (r) = Recorded By, (t) = Taken By, (c) = Cosigned By      Initials Name Provider Type    Gema Garcia PTA Physical Therapist Assistant    Lynn Fragoso RN Registered Nurse                                 Physical Therapy Education       Title: PT OT SLP Therapies (In Progress)       Topic: Physical Therapy (Done)       Point: Mobility training (Done)       Learning Progress Summary             Patient Acceptance, E,D, VU,NR by EB at 11/8/2024 1016    Acceptance, E,D, VU,NR by EB at 11/7/2024 1142    Acceptance, E,D, VU,NR by EB at 11/6/2024 1042    Acceptance, E,TB, NR by MS at 11/5/2024 1538    Acceptance, E, NL,NR by KT at 11/4/2024 1144                      Point: Home exercise program (Done)       Learning Progress Summary            Patient Acceptance, E,D, VU,NR by EB at 11/8/2024 1016    Acceptance, E,D, VU,NR by EB at 11/7/2024 1142    Acceptance, E,D, VU,NR by EB at 11/6/2024 1042    Acceptance, E,TB, NR by MS at 11/5/2024 1538    Acceptance, E, NL,NR by KT at 11/4/2024 1144                      Point: Body mechanics (Done)       Learning Progress Summary            Patient Acceptance, E,D, VU,NR by EB at 11/8/2024 1016    Acceptance, E,D, VU,NR by EB at 11/7/2024 1142    Acceptance, E,D, VU,NR by EB at 11/6/2024 1042    Acceptance, E,TB, NR by MS at 11/5/2024 1538    Acceptance, E, NL,NR by KT at 11/4/2024 1144                      Point: Precautions (Done)       Learning Progress Summary            Patient Acceptance, E,D, VU,NR by EB at 11/8/2024 1016    Acceptance, E,D, VU,NR by EB at 11/7/2024 1142    Acceptance, E,TB, NR by MS at 11/5/2024 1538    Acceptance, E, NL,NR by KT at 11/4/2024 1144                                      User Key       Initials Effective Dates Name Provider Type Discipline    MS 06/16/21 -  Twila Puente PT Physical Therapist PT    EB 02/14/23 -  Gema Crespo PTA Physical Therapist Assistant PT    KT 09/04/24 -  Clara Ortiz, RN Registered Nurse Nurse                  PT Recommendation and Plan     Progress: improving  Outcome Evaluation: Pt seen for PT tx this AM. Pt agreeable to participate, pt more responsive, joking with daughter and smiling today. Pt also improved with initiation of mobility and required less assist. Pt completed supine to sit with MinAX2 and stood from EOB with MinAX2. Pt was able to self correct forward flexed posture  without cues. Pt ambulated about 5ft with MinAX2/HHAx2. Pt up to the chair and completed ilia LE exercises. Encouraged pt and nsg staff to get up to the chair multiple times a day especially with meals. Will continue to follow and progress pt as able.     Time Calculation:         PT Charges       Row Name 11/08/24 1021             Time Calculation    Start Time 0832  -EB      Stop Time 0855  -EB      Time Calculation (min) 23 min  -EB      PT Received On 11/08/24  -EB      PT - Next Appointment 11/09/24  -EB         Time Calculation- PT    Total Timed Code Minutes- PT 23 minute(s)  -EB                User Key  (r) = Recorded By, (t) = Taken By, (c) = Cosigned By      Initials Name Provider Type    EB Gema Crespo PTA Physical Therapist Assistant                  Therapy Charges for Today       Code Description Service Date Service Provider Modifiers Qty    13716744707 HC PT THERAPEUTIC ACT EA 15 MIN 11/7/2024 Gema Crespo, PTA GP 1    53976248789 HC PT THER PROC EA 15 MIN 11/7/2024 Gema Crespo, GREER GP 1    36666465933 HC PT THER SUPP EA 15 MIN 11/7/2024 Gema Crespo, GREER GP 1    61148010968 HC GAIT TRAINING EA 15 MIN 11/8/2024 Gema Crespo, GREER GP 1    03629264455 HC PT THERAPEUTIC ACT EA 15 MIN 11/8/2024 Gema Crespo PTA GP 1    25621781419 HC PT THER SUPP EA 15 MIN 11/8/2024 Gema Crespo, GREER GP 1            PT G-Codes  AM-PAC 6 Clicks Score (PT): 14  PT Discharge Summary  Anticipated Discharge Disposition (PT): inpatient rehabilitation facility    Geam Crespo PTA  11/8/2024

## 2024-11-08 NOTE — PLAN OF CARE
Goal Outcome Evaluation:      Tube feeding changed to 2000 to 0800 and changed diet to regular after video today. Went to dialysis but they were unable to do. See note. Random vanc level tomorrow at 0600. Ccp working on placement. Family at side. Safety maintained. Veronica MARADIAGA

## 2024-11-08 NOTE — PLAN OF CARE
Goal Outcome Evaluation:  Plan of Care Reviewed With: patient           Outcome Evaluation: VFSS completed. Recommend soft, chopped, no mixed and thin; meds whole or crushed w/ thin or pureed as tolerated; upright for meals and 30 min after; slow rate; small bites/sips; assist with feeding. Recommend keeping Cortrak in to assure tolerance x1-2 days. ST to follow. If s/s are noted, make NPO.    Anticipated Discharge Disposition (SLP): unknown          SLP Swallowing Diagnosis: mild, oral dysphagia, pharyngeal dysphagia (11/08/24 7328)

## 2024-11-08 NOTE — PROGRESS NOTES
[facility]     LOS: 7 days     Subjective:    CC:No chief complaint on file.    DIET:  Diet Order   Procedures    Diet: Regular/House; No Mixed Consistencies, Feeding Assistance - Nursing; Texture: Soft to Chew (NDD 3); Soft to Chew: Chopped Meat; Fluid Consistency: Thin (IDDSI 0)       Objective:    Vital Signs:  Temp:  [98.3 °F (36.8 °C)-98.8 °F (37.1 °C)] 98.3 °F (36.8 °C)  Heart Rate:  [81-86] 84  Resp:  [16-18] 18  BP: ()/(46-94) 141/48    SpO2:  [91 %-100 %] 100 %  on  Flow (L/min) (Oxygen Therapy):  [1.5] 1.5;   Device (Oxygen Therapy): room air  Body mass index is 26.17 kg/m².    Physical Exam  Constitutional:       General: He is not in acute distress.     Comments: Patient is awake and alert but he is oriented to person and he knows his wife   Pulmonary:      Effort: Pulmonary effort is normal.      Breath sounds: Normal breath sounds.   Abdominal:      General: There is no distension.      Palpations: Abdomen is soft.      Tenderness: There is no abdominal tenderness.   Skin:     General: Skin is warm and dry.         Results Review:    Glucose   Date Value Ref Range Status   11/08/2024 166 (H) 65 - 99 mg/dL Final   11/07/2024 180 (H) 65 - 99 mg/dL Final   11/06/2024 156 (H) 65 - 99 mg/dL Final     Results from last 7 days   Lab Units 11/08/24  0534   WBC 10*3/mm3 7.23   HEMOGLOBIN g/dL 8.3*   HEMATOCRIT % 26.8*   PLATELETS 10*3/mm3 142     Results from last 7 days   Lab Units 11/08/24  0534 11/07/24  0631 11/06/24  0527   SODIUM mmol/L 132*   < > 135*   POTASSIUM mmol/L 3.7   < > 3.5   CHLORIDE mmol/L 98   < > 102   CO2 mmol/L 22.0   < > 21.3*   BUN mg/dL 47*   < > 38*   CREATININE mg/dL 5.42*   < > 7.39*   CALCIUM mg/dL 8.4*   < > 9.0   BILIRUBIN mg/dL  --   --  0.4   ALK PHOS U/L  --   --  70   ALT (SGPT) U/L  --   --  <5   AST (SGOT) U/L  --   --  19   GLUCOSE mg/dL 166*   < > 156*    < > = values in this interval not displayed.         Results from last 7 days    Lab Units 11/06/24  0527   MAGNESIUM mg/dL 2.3         Cultures:  Blood Culture   Date Value Ref Range Status   11/02/2024 No growth at 4 days  Preliminary   11/02/2024 No growth at 4 days  Preliminary       I have reviewed daily medications and changes in CPOE    Scheduled meds  acetaminophen, 1,000 mg, Nasogastric, Q8H  arformoterol, 15 mcg, Nebulization, BID - RT  ARIPiprazole, 2 mg, Oral, BID  atorvastatin, 40 mg, Oral, Daily  budesonide, 0.5 mg, Nebulization, BID - RT  Check Fentanyl Patch Placement, 1 each, Does not apply, Q12H  heparin (porcine), 5,000 Units, Subcutaneous, Q8H  insulin regular, 2-7 Units, Subcutaneous, Q6H  levothyroxine, 175 mcg, Nasogastric, Q AM  Lidocaine, 1 patch, Transdermal, Q24H  midodrine, 10 mg, Oral, TID AC  pantoprazole, 40 mg, Intravenous, BID AC  phosphorus, 500 mg, Nasogastric, 4x Daily  senna-docusate sodium, 2 tablet, Nasogastric, Daily  sertraline, 100 mg, Oral, Nightly  sodium chloride, 10 mL, Intravenous, Q12H  torsemide, 100 mg, Oral, Daily  vancomycin, 750 mg, Intravenous, Once  Vancomycin Pharmacy Intermittent/Pulse Dosing, , Does not apply, Daily        Pharmacy to dose vancomycin,       PRN meds    [DISCONTINUED] senna-docusate sodium **AND** polyethylene glycol **AND** bisacodyl **AND** bisacodyl    cloNIDine    dextrose    dextrose    famotidine    glucagon (human recombinant)    heparin (porcine)    ipratropium-albuterol    labetalol    melatonin    nitroglycerin    OLANZapine    ondansetron    oxyCODONE    Pharmacy to dose vancomycin    sodium chloride    sodium chloride        Discitis of lumbar region    Essential hypertension    Anemia due to chronic kidney disease    Type 2 diabetes mellitus without complication    Bipolar disorder    Chronic respiratory failure with hypoxia    ESRD on dialysis    Discitis    Metabolic encephalopathy    Aspiration pneumonia    Severe malnutrition        Assessment/Plan:         Coag negative staph acteremia  L1/2 discitis ,  "T5/6 discitis.   -Blood cultures 10/31/2024 positive for coag negative staph  -IV ceftriaxone discontinued, IV vancomycin continued per ID  -MRI of the cervical spine/thoracic/lumbar-limited studies but thought to have L1/2 discitis along with T5/6 discitis.   -TDC catheter removed 11/3/2024 as likely source of infection.  Vascular surgery managing  -Neurosurgery evaluated, no intervention planned, plan for repeat MRI of lumbar and thoracic spine with and without contrast in 3 months.  -Echocardiogram 11/4/24-normal left ventricular function, EF 56 to 60%, no findings of vegetation/infection  ID evaluated and recommended \"vancomycin dosing per pharmacy with dialysis only x 6 weeks.  Antibiotic stop date December 14  Please monitor weekly CBC with differential and vancomycin troughs and fax the results to the infectious disease clinic at [phone]  We will arrange for ID follow-up on December 13\"  -Continue IV vancomycin with dialysis inpatient  -Hold Dilaudid for now due to encephalopathy  -Scheduled bowel regimen, hold for loose stools     Hypotension  -Increase midodrine to 10 mg 3 times daily 11/6     Metabolic encephalopathy  Hallucinations  Bipolar disorder  -CT head 10/8/2024 with no acute findings  -Psychiatry following.  On as needed Zyprexa.  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 every 6 hours  -we have also stopped amitriptyline    hypoxemia  Doubt pneumonia --- with normal white blood cell count of 5.7 and no change in differential, and completely normal temperature curve  COPD  -CT scan 10/41/24 mild patchy and consolidative airspace opacities in the right lower lobe with small adjacent pleural effusion new from previous comparison. Differential " includes atelectasis versus early developing infection.  -Continue arformoterol, budesonide nebulizer     ESRD on HD  -has been on home hemodialysis receiving 5 times a week   -Nephrology managing dialysis  -TDC catheter removed 11/3/2024 per vascular as likely source of infection.  Currently on dialysis holiday  -vascular surgery evaluated viability of his left AV fistula,Fistula based upon duplex criteria appears mature and able to be utilized.  Plan to attempt follow-up cannulization, however if fails due to technical issues/agitation, will need repeat TDC catheter placement.     Gastritis  Abdominal pain  -EGD 10/28/2024 showed gastritis  -IV PPI twice daily     Hypothyroidism  -Continue levothyroxine 175 mcg daily     Essential hypertension  -BP previously low and now normalizing, hold carvedilol      Diabetes mellitus  -SSI.  Hypoglycemic protocol     Anemia of chronic disease  -Hemoglobin stable around 8.  -Monitor and transfuse as indicated for symptomatic anemia or hemoglobin less than 7    Unable to get any dialysis today this patient was not cooperative, his last dialysis was on Wednesday    Diet was advanced to regular with thin liquid and tube feeds changed to 8 pm to 8 am      Donovan Gonzalez MD  11/08/24  15:49 EST

## 2024-11-08 NOTE — DISCHARGE PLACEMENT REQUEST
"Nelson Wang (79 y.o. Male)       Date of Birth   1945    Social Security Number       Address   [address] [place] [address] [facility]    Home Phone   [phone]    MRN   [id number]       Restoration   Oriental orthodox    Marital Status                               Admission Date   11/1/24    Admission Type   Urgent    Admitting Provider   Fela Snyder MD    Attending Provider   Donovan Gonzalez MD    Department, Room/Bed   [address], [address]/1       Discharge Date       Discharge Disposition       Discharge Destination                                 Attending Provider: Donovan Gonzalez MD    Allergies: No Known Allergies    Isolation: None   Infection: None   Code Status: CPR    Ht: 167 cm (65.75\")   Wt: 73 kg (160 lb 14.4 oz)    Admission Cmt: None   Principal Problem: Discitis of lumbar region [M46.46]                   Active Insurance as of 11/1/2024       Primary Coverage       Payor Plan Insurance Group Employer/Plan Group    MEDICARE MEDICARE A & B        Payor Plan Address Payor Plan Phone Number Payor Plan Fax Number Effective Dates    [address] [phone]  1/1/2011 - None Entered    [facility] [id number]         Subscriber Name Subscriber Birth Date Member ID       NELSON WANG 1945 [id number]               Secondary Coverage       Payor Plan Insurance Group Employer/Plan Group    AARP MC SUP AARP HEALTH CARE OPTIONS        Payor Plan Address Payor Plan Phone Number Payor Plan Fax Number Effective Dates    [facility] [phone]  1/1/2021 - None Entered    [address]       [facility] [address]         Subscriber Name Subscriber Birth Date Member ID       NELSON WANG 1945 [id number]                     Emergency Contacts        (Rel.) Home Phone Work Phone Mobile Phone    Jose Wang (Daughter) [phone] -- [phone]    KALEIGH WANG (Spouse) -- -- [phone]                "

## 2024-11-08 NOTE — NURSING NOTE
Hd unable to be completed. Pt cognitively incapable of following safety instructions. Pt lays in fetal position with head in avf region, beds arm, and resists interventions. Pt bent arm while laying on avf needles. Art needle infiltrated. Unable to re establish second art needle due to pt resisting. Dr Wolf notified. No new orders at this time. Tx terminated for pt safety. Post vss 107/80 hr 67

## 2024-11-08 NOTE — MBS/VFSS/FEES
Acute Care - Speech Language Pathology   Swallow Initial Evaluation [facility]     Patient Name: Nelson Wang  : 1945  MRN: [id number]  Today's Date: 2024               Admit Date: 2024    Visit Dx:   No diagnosis found.  Patient Active Problem List   Diagnosis    Essential hypertension    Bradycardia, sinus    Anemia due to chronic kidney disease    Hypothyroidism    Idiopathic gout    Proteinuria    Psoriasis    Thrombocytopenia    Type 2 diabetes mellitus without complication    CKD (chronic kidney disease), stage IV    Hyperlipidemia    Monoclonal gammopathy of unknown significance (MGUS)    Stage 5 chronic kidney disease    Chronic gout without tophus    Peritoneal dialysis catheter dysfunction    Medicare annual wellness visit, subsequent    Chronic cough    Peritonitis associated with peritoneal dialysis    Recurrent pneumonia    Acute on chronic respiratory failure with hypoxia    End stage renal disease    Aspiration pneumonitis    Sepsis    Type 2 diabetes mellitus, with long-term current use of insulin    GERD without esophagitis    Immunosuppression due to drug therapy    Bipolar disorder    Chronic respiratory failure with hypoxia    Shortness of breath    Abnormal stress test    ESRD on dialysis    Abnormal chest CT    Encounter for screening for malignant neoplasm of colon    History of colon polyps    Family history of colon cancer    Intractable pain    Abdominal pain    ESRD (end stage renal disease) on dialysis    AMS (altered mental status)    Hallucinations    LUQ pain    Discitis    Metabolic encephalopathy    Aspiration pneumonia    Discitis of lumbar region    Severe malnutrition     Past Medical History:   Diagnosis Date    Abnormal stress test     Anemia     IRON INFUSIONS WITH DIALYSIS    Anesthesia     WITH HIP REPLACEMENT DAUGHTER BELIEVES HAD SVT     Ankle pain, right     Anxiety and depression     CKD (chronic kidney disease), stage IV     DIALYSIS  [id number] SHILEY IN RIGHT CHEST    Diabetes     Gout     High cholesterol     History of peritoneal dialysis     HL (hearing loss)     Hyperkalemia     Hypertension     Hypothyroidism     Insomnia     Night terrors     Psoriasis     Psoriasis     Sleep walking     Thrombocytopenia     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    Vitiligo      Past Surgical History:   Procedure Laterality Date    ANKLE SURGERY Right 11/1990    APPENDECTOMY N/A 1954    ARTERIOVENOUS FISTULA/SHUNT SURGERY Left 07/16/2024    Procedure: Creation of left arm arteriovenous fistula;  Surgeon: Moses Mir MD;  Location: [facility] MAIN OR;  Service: Vascular;  Laterality: Left;    BRONCHOSCOPY N/A 03/01/2024    Procedure: BRONCHOSCOPY WITH BAL AND WASHINGS;  Surgeon: Sandra Espinal MD;  Location: [facility] ENDOSCOPY;  Service: Pulmonary;  Laterality: N/A;  PNEUMONIA    BRONCHOSCOPY N/A 08/30/2024    Procedure: BRONCHOSCOPY WITH BALS AND WASHINGS;  Surgeon: Sandra Espinal MD;  Location: [facility] ENDOSCOPY;  Service: Pulmonary;  Laterality: N/A;  MUCOUS PLUGGING    CARDIAC CATHETERIZATION N/A 05/29/2024    Procedure: Left Heart Cath with possible coronary angioplasty;  Surgeon: Stephan Nichols MD;  Location: [facility] CATH INVASIVE LOCATION;  Service: Cardiology;  Laterality: N/A;    COLONOSCOPY N/A 06/2022    [facility]    ENDOSCOPY N/A 10/28/2024    Procedure: ESOPHAGOGASTRODUODENOSCOPY INSERTION OF LIGHTED INSTRUMENT TO VIEW ESOPHAGUS, STOMACH AND SMALL INTESTINE;  Surgeon: Kingsley Peraza MD;  Location: [facility] ENDOSCOPY;  Service: Gastroenterology;  Laterality: N/A;  Gastritis    HIP BIPOLAR REPLACEMENT Right 01/2000    INSERTION HEMODIALYSIS CATHETER Left 04/09/2024    Procedure: HEMODIALYSIS CATHETER INSERTION;  Surgeon: Jose Berry MD;  Location: [facility] OR;  Service: General;  Laterality: Left;    INSERTION HEMODIALYSIS CATHETER N/A 04/12/2024    Procedure: Tunneled hemodialysis catheter insertion;   Surgeon: Enrique Vinson MD;  Location: [facility] MAIN OR;  Service: Vascular;  Laterality: N/A;    INSERTION PERITONEAL DIALYSIS CATHETER N/A 03/27/2023    Procedure: LAPAROSCOPIC INSERTION PERITONEAL DIALYSIS CATHETER, LAPAROSCOPIC OMENTOPEXY WITH LYSIS OF ADHESIONS;  Surgeon: Jose Berry MD;  Location: [facility] MAIN OR;  Service: General;  Laterality: N/A;    INSERTION PERITONEAL DIALYSIS CATHETER Left 07/23/2023    Procedure: REVISION OF PERITONEAL DIALYSIS CATHETER;  Surgeon: Radha Oreilly MD;  [facility];  Service: General;  Laterality: Left;    REMOVAL PERITONEAL DIALYSIS CATHETER N/A 04/09/2024    Procedure: REMOVAL PERITONEAL DIALYSIS CATHETER;  Surgeon: Jose Berry MD;  [facility];  Service: General;  Laterality: N/A;    RENAL BIOPSY Left 07/15/2022    UPPER GASTROINTESTINAL ENDOSCOPY      WRIST SURGERY      UNSURE WHICH SIDE DAUGHTER REPORTS HAD SEVERE  CUT FROM WINDOW AND THEY HAD TO DO RECONSTRUCTIVE SURGERY WITH VESSELS AND NERVES       SLP Recommendation and Plan  SLP Swallowing Diagnosis: mild, oral dysphagia, pharyngeal dysphagia (11/08/24 0845)  SLP Diet Recommendation: soft to chew textures, chopped, thin liquids, no mixed consistencies, other (see comments) (Can upgrade at bedside if tolerating.) (11/08/24 0845)  Recommended Precautions and Strategies: upright posture during/after eating, small bites of food and sips of liquid, assist with feeding (11/08/24 0845)  SLP Rec. for Method of Medication Administration: meds whole, meds crushed, with thin liquids, with puree, as tolerated (11/08/24 0845)     Monitor for Signs of Aspiration: yes, notify SLP if any concerns (11/08/24 0845)     Swallow Criteria for Skilled Therapeutic Interventions Met: demonstrates skilled criteria (11/08/24 0845)  Anticipated Discharge Disposition (SLP): unknown (11/08/24 0845)  Rehab Potential/Prognosis, Swallowing: good, to achieve stated therapy goals (11/08/24  0845)  Therapy Frequency (Swallow): PRN (11/08/24 0845)  Predicted Duration Therapy Intervention (Days): until discharge (11/08/24 0845)  Oral Care Recommendations: Oral Care BID/PRN (11/08/24 0845)                                        Outcome Evaluation: VFSS completed. Recommend soft, chopped, no mixed and thin; meds whole or crushed w/ thin or pureed as tolerated; upright for meals and 30 min after; slow rate; small bites/sips; assist with feeding. Recommend keeping Cortrak in to assure tolerance x1-2 days. ST to follow. If s/s are noted, make NPO.      SWALLOW EVALUATION (Last 72 Hours)       SLP Adult Swallow Evaluation       Row Name 11/08/24 0845 11/06/24 1500                Rehab Evaluation    Document Type evaluation  -AW re-evaluation  -SH       Subjective Information no complaints  -AW --       Patient Observations alert;cooperative;agree to therapy  -AW --       Patient/Family/Caregiver Comments/Observations Pt appeared confused, follow most simple commands, repetitions needed.  -AW Daughter in person, and second daughter over the phone  -SH       Patient Effort good  -AW adequate  -SH       Symptoms Noted During/After Treatment none  -AW --          General Information    Patient Profile Reviewed yes  -AW yes  -SH       Pertinent History Of Current Problem Pt admitted with L 1/2 and T 5/6 discitis.  -AW --       Current Method of Nutrition NPO;nasogastric feedings  -AW --       Precautions/Limitations, Vision WFL;for purposes of eval  -AW --       Precautions/Limitations, Hearing WFL;for purposes of eval  -AW --       Prior Level of Function-Communication unknown  -AW --       Prior Level of Function-Swallowing other (see comments)  VFSS 2/9/24 recommended regular and NTL. Pt was non-compliant with thickened liquids after 2 weeks.  -AW --       Plans/Goals Discussed with patient;agreed upon  -AW --       Barriers to Rehab previous functional deficit;cognitive status  -AW --       Patient's Goals for  Discharge patient did not state  -AW --          Pain    Pretreatment Pain Rating 0/10 - no pain  -AW --       Posttreatment Pain Rating 0/10 - no pain  -AW --       Additional Documentation -- Pain Scale: FACES Pre/Post-Treatment (Group)  -          Pain Scale: FACES Pre/Post-Treatment    Pain: FACES Scale, Pretreatment -- 2-->hurts little bit  -       Posttreatment Pain Rating -- 2-->hurts little bit  -       Pre/Posttreatment Pain Comment -- Trying to move around in bed, despite repositioning.  -          Oral Motor Structure and Function    Dentition Assessment natural, present and adequate  -AW --       Secretion Management WNL/WFL  -AW --       Mucosal Quality moist, healthy  -AW --          Oral Musculature and Cranial Nerve Assessment    Oral Motor General Assessment WFL  -AW --          General Eating/Swallowing Observations    Respiratory Support Currently in Use room air  -AW --       Eating/Swallowing Skills self-fed;fed by SLP  -AW --       Positioning During Eating upright in bed;needs frequent re-positioning  -AW --          MBS/VFSS    Utensils Used spoon;cup;straw  -AW --       Consistencies Trialed regular textures;soft to chew textures;mixed consistency;pureed;thin liquids;nectar/syrup-thick liquids;honey-thick liquids  -AW --          MBS/VFSS Interpretation    VFSS Summary VFSS completed with Kelsey PENA. Pt needed consistent cues to participate and take trials. Pt exhibited mild oroparyngael dysphagia characterized by lingual weakness impacted by cognition. Pt took trials of honey thick (cup), nectar thick (cup/straw), thin (cup/straw), pureed, soft, mixed, and regular solids. No penetration or aspiration was observed. Mastication and oral transfer was disorganized and slow. Pt was able to clear oral cavity independently. No significant pharyngeal residuals were noted. Recommend soft, chopped no mixed w/ thin, supervision with meals, and close monitoring for s/s given pt's  cognitive status.  -AW --          SLP Communication to Radiology    Summary Statement VFSS completed with Kelsey PENA. Pt needed consistent cues to participate and take trials. Pt exhibited mild oroparyngael dysphagia characterized by lingual weakness impacted by cognition. Pt took trials of honey thick (cup), nectar thick (cup), thin (cup/straw), pureed, soft, mixed, and regular solids. No penetration or aspiration was observed. Mastication and oral transfer was disorganized and slow.  -AW --          SLP Evaluation Clinical Impression    SLP Swallowing Diagnosis mild;oral dysphagia;pharyngeal dysphagia  -AW --       Functional Impact risk of aspiration/pneumonia  -AW --       Rehab Potential/Prognosis, Swallowing good, to achieve stated therapy goals  -AW --       Swallow Criteria for Skilled Therapeutic Interventions Met demonstrates skilled criteria  -AW --          Recommendations    Therapy Frequency (Swallow) PRN  -AW --       Predicted Duration Therapy Intervention (Days) until discharge  -AW --       SLP Diet Recommendation soft to chew textures;chopped;thin liquids;no mixed consistencies;other (see comments)  Can upgrade at bedside if tolerating.  -AW --       Recommended Precautions and Strategies upright posture during/after eating;small bites of food and sips of liquid;assist with feeding  -AW --       Oral Care Recommendations Oral Care BID/PRN  -AW --       SLP Rec. for Method of Medication Administration meds whole;meds crushed;with thin liquids;with puree;as tolerated  -AW --       Monitor for Signs of Aspiration yes;notify SLP if any concerns  -AW --       Anticipated Discharge Disposition (SLP) unknown  -AW --          (STG) Patient will tolerate trials of    Consistencies Trialed (Tolerate trials) soft to chew (chopped) textures;thin liquids  -AW --       Desired Outcome (Tolerate trials) without signs/symptoms of aspiration  -AW --       [place] (Tolerate trials) independently (over  90% accuracy)  -AW --                 User Key  (r) = Recorded By, (t) = Taken By, (c) = Cosigned By      Initials Name Effective Dates    Sasha Pool, ADILSON 08/28/23 -     Dana Watson SLP 01/05/24 -                     EDUCATION  The patient has been educated in the following areas:   Dysphagia (Swallowing Impairment) Oral Care/Hydration Modified Diet Instruction.        SLP GOALS       Row Name 11/08/24 0845 11/06/24 1500          (STG) Patient will tolerate trials of    Consistencies Trialed (Tolerate trials) soft to chew (chopped) textures;thin liquids  -AW pureed textures;honey/ moderately thick liquids  -SH     Desired Outcome (Tolerate trials) without signs/symptoms of aspiration  -AW without signs/symptoms of aspiration  -SH     [place] (Tolerate trials) independently (over 90% accuracy)  -AW independently (over 90% accuracy)  -SH     Comment (Tolerate trials) -- Patient seen for clinical re evaluation of swallowing. Of note, patient with VFSS 2/2024 which revealed silent aspiration thins. Oriented to name. Generalized weakness noted. Required physical cues to feed self. Throat clearing before the swallow with ice. Intermittent throat clear with nectar via cup and strained mech soft. Mild lingual residue post swallow with soft solids. No overt s/s of aspiration with honey via cup/straw or puree. SLP recs honey and puree, straws okay. Meds crushed in puree or via cortrak. Assist with meals. Feed only when alert. Daughter (over the phone) requested continuing tube feedings d/t poor nutrition prior to placement. Will likely repeat VFSS prior to D/C. Of note, order for OP VFSS already ordered prior to hospitalization.  -               User Key  (r) = Recorded By, (t) = Taken By, (c) = Cosigned By      Initials Name Provider Type    Sasha Pool, SLP Speech and Language Pathologist    SH Dana Cueto, ADILSON Speech and Language Pathologist                         Time Calculation:    Time  Calculation- SLP       Row Name 11/08/24 1211             Time Calculation- SLP    SLP Start Time 0845  -AW      SLP Received On 11/08/24  -                User Key  (r) = Recorded By, (t) = Taken By, (c) = Cosigned By      Initials Name Provider Type    Sasha Pool SLP Speech and Language Pathologist                    Therapy Charges for Today       Code Description Service Date Service Provider Modifiers Qty    [id number] HC ST MOTION FLUORO EVAL SWALLOW 5 11/8/2024 Sasha Boles SLP GN 1                 ADILSON [place]  11/8/2024

## 2024-11-08 NOTE — PLAN OF CARE
Goal Outcome Evaluation:  Plan of Care Reviewed With: patient        Progress: improving  Outcome Evaluation: Pt seen for PT tx this AM. Pt agreeable to participate, pt more responsive, joking with daughter and smiling today. Pt also improved with initiation of mobility and required less assist. Pt completed supine to sit with MinAX2 and stood from EOB with MinAX2. Pt was able to self correct forward flexed posture without cues. Pt ambulated about 5ft with MinAX2/HHAx2. Pt up to the chair and completed ilia LE exercises. Encouraged pt and nsg staff to get up to the chair multiple times a day especially with meals. Will continue to follow and progress pt as able.    Anticipated Discharge Disposition (PT): inpatient rehabilitation facility

## 2024-11-08 NOTE — PROGRESS NOTES
"[facility] Clinical Pharmacy Services: Vancomycin Monitoring Note    Nelson Wang is a 79 y.o. male who is on day 8 of pharmacy to dose vancomycin for for L1/2 and T5/6 discitis.  Stop date: 12/13/24     Previous Vancomycin Dose: 750 mg IV once on 11/6 at 2113, intermittent dosing    Updated Cultures and Sensitivities:   10/31: BCx-CoN Staph (2 of 2 bottles)  10/31: MRSA PCR-negative  11/2: BCx-negative    Results from last 7 days   Lab Units 11/08/24  0534 11/07/24 0445 11/06/24 0527   VANCOMYCIN RM mcg/mL 17.30 22.10 13.50     Vitals/Labs  Ht: 167 cm (65.75\"); Wt: 73 kg (160 lb 14.4 oz)   Temp Readings from Last 1 Encounters:   11/07/24 98.6 °F (37 °C) (Oral)     Estimated Creatinine Clearance: 11.6 mL/min (A) (by C-G formula based on SCr of 5.42 mg/dL (H)).     Results from last 7 days   Lab Units 11/08/24  0534 11/07/24  0631 11/07/24 0445 11/06/24 0527   CREATININE mg/dL 5.42* 3.99*  --  7.39*   WBC 10*3/mm3 7.23  --  5.72 5.82     Assessment/Plan    Level came back at 17.3 mcg/mL. This is within the pre-IHD goal level of 15-25 mcg/mL. Will re-dose at this time.  Will schedule a dose of 750 mg IV once to be given after dialysis today.  Next Level Date and Time: Vanc Random is scheduled for tomorrow morning 11/9 with am labs.  We will continue to monitor patient changes and dialysis schedule.     Thank you for involving pharmacy in this patient's care. Please contact pharmacy with any questions or concerns.       Yasmeen Barry, Pharm.D., [facility]   Clinical Pharmacist  "

## 2024-11-09 LAB
ALBUMIN SERPL-MCNC: 2.9 G/DL (ref 3.5–5.2)
ANION GAP SERPL CALCULATED.3IONS-SCNC: 14 MMOL/L (ref 5–15)
BASOPHILS # BLD AUTO: 0.03 10*3/MM3 (ref 0–0.2)
BASOPHILS NFR BLD AUTO: 0.5 % (ref 0–1.5)
BUN SERPL-MCNC: 52 MG/DL (ref 8–23)
BUN/CREAT SERPL: 8.9 (ref 7–25)
CALCIUM SPEC-SCNC: 8.2 MG/DL (ref 8.6–10.5)
CHLORIDE SERPL-SCNC: 98 MMOL/L (ref 98–107)
CO2 SERPL-SCNC: 23 MMOL/L (ref 22–29)
CREAT SERPL-MCNC: 5.87 MG/DL (ref 0.76–1.27)
DEPRECATED RDW RBC AUTO: 47.5 FL (ref 37–54)
EGFRCR SERPLBLD CKD-EPI 2021: 9.2 ML/MIN/1.73
EOSINOPHIL # BLD AUTO: 0.14 10*3/MM3 (ref 0–0.4)
EOSINOPHIL NFR BLD AUTO: 2.1 % (ref 0.3–6.2)
ERYTHROCYTE [DISTWIDTH] IN BLOOD BY AUTOMATED COUNT: 14.8 % (ref 12.3–15.4)
GLUCOSE BLDC GLUCOMTR-MCNC: 126 MG/DL (ref 70–130)
GLUCOSE BLDC GLUCOMTR-MCNC: 134 MG/DL (ref 70–130)
GLUCOSE BLDC GLUCOMTR-MCNC: 157 MG/DL (ref 70–130)
GLUCOSE BLDC GLUCOMTR-MCNC: 260 MG/DL (ref 70–130)
GLUCOSE SERPL-MCNC: 137 MG/DL (ref 65–99)
HBA1C MFR BLD: 5.9 % (ref 4.8–5.6)
HCT VFR BLD AUTO: 26.5 % (ref 37.5–51)
HGB BLD-MCNC: 8.4 G/DL (ref 13–17.7)
IMM GRANULOCYTES # BLD AUTO: 0.12 10*3/MM3 (ref 0–0.05)
IMM GRANULOCYTES NFR BLD AUTO: 1.8 % (ref 0–0.5)
LYMPHOCYTES # BLD AUTO: 1.48 10*3/MM3 (ref 0.7–3.1)
LYMPHOCYTES NFR BLD AUTO: 22.2 % (ref 19.6–45.3)
MCH RBC QN AUTO: 28.4 PG (ref 26.6–33)
MCHC RBC AUTO-ENTMCNC: 31.7 G/DL (ref 31.5–35.7)
MCV RBC AUTO: 89.5 FL (ref 79–97)
MONOCYTES # BLD AUTO: 0.82 10*3/MM3 (ref 0.1–0.9)
MONOCYTES NFR BLD AUTO: 12.3 % (ref 5–12)
NEUTROPHILS NFR BLD AUTO: 4.07 10*3/MM3 (ref 1.7–7)
NEUTROPHILS NFR BLD AUTO: 61.1 % (ref 42.7–76)
NRBC BLD AUTO-RTO: 0 /100 WBC (ref 0–0.2)
PHOSPHATE SERPL-MCNC: 1.8 MG/DL (ref 2.5–4.5)
PLATELET # BLD AUTO: 149 10*3/MM3 (ref 140–450)
PMV BLD AUTO: 11.3 FL (ref 6–12)
POTASSIUM SERPL-SCNC: 3.9 MMOL/L (ref 3.5–5.2)
RBC # BLD AUTO: 2.96 10*6/MM3 (ref 4.14–5.8)
SODIUM SERPL-SCNC: 135 MMOL/L (ref 136–145)
TSH SERPL DL<=0.05 MIU/L-ACNC: 6.43 UIU/ML (ref 0.27–4.2)
VANCOMYCIN SERPL-MCNC: 23 MCG/ML (ref 5–40)
WBC NRBC COR # BLD AUTO: 6.66 10*3/MM3 (ref 3.4–10.8)

## 2024-11-09 PROCEDURE — 83036 HEMOGLOBIN GLYCOSYLATED A1C: CPT | Performed by: INTERNAL MEDICINE

## 2024-11-09 PROCEDURE — 80202 ASSAY OF VANCOMYCIN: CPT | Performed by: STUDENT IN AN ORGANIZED HEALTH CARE EDUCATION/TRAINING PROGRAM

## 2024-11-09 PROCEDURE — 80069 RENAL FUNCTION PANEL: CPT | Performed by: INTERNAL MEDICINE

## 2024-11-09 PROCEDURE — 25010000002 HEPARIN (PORCINE) PER 1000 UNITS: Performed by: STUDENT IN AN ORGANIZED HEALTH CARE EDUCATION/TRAINING PROGRAM

## 2024-11-09 PROCEDURE — 94664 DEMO&/EVAL PT USE INHALER: CPT

## 2024-11-09 PROCEDURE — 84443 ASSAY THYROID STIM HORMONE: CPT | Performed by: INTERNAL MEDICINE

## 2024-11-09 PROCEDURE — 63710000001 INSULIN REGULAR HUMAN PER 5 UNITS: Performed by: NURSE PRACTITIONER

## 2024-11-09 PROCEDURE — 94799 UNLISTED PULMONARY SVC/PX: CPT

## 2024-11-09 PROCEDURE — 85025 COMPLETE CBC W/AUTO DIFF WBC: CPT | Performed by: INTERNAL MEDICINE

## 2024-11-09 PROCEDURE — 82948 REAGENT STRIP/BLOOD GLUCOSE: CPT

## 2024-11-09 RX ORDER — MIDODRINE HYDROCHLORIDE 5 MG/1
5 TABLET ORAL
Status: DISCONTINUED | OUTPATIENT
Start: 2024-11-10 | End: 2024-11-13

## 2024-11-09 RX ADMIN — PANTOPRAZOLE SODIUM 40 MG: 40 INJECTION, POWDER, FOR SOLUTION INTRAVENOUS at 17:34

## 2024-11-09 RX ADMIN — BUDESONIDE 0.5 MG: 0.5 INHALANT ORAL at 08:07

## 2024-11-09 RX ADMIN — HEPARIN SODIUM 5000 UNITS: 5000 INJECTION INTRAVENOUS; SUBCUTANEOUS at 21:14

## 2024-11-09 RX ADMIN — ARIPIPRAZOLE 2 MG: 2 TABLET ORAL at 09:08

## 2024-11-09 RX ADMIN — ARIPIPRAZOLE 2 MG: 2 TABLET ORAL at 21:14

## 2024-11-09 RX ADMIN — ACETAMINOPHEN 1000 MG: 500 TABLET ORAL at 17:34

## 2024-11-09 RX ADMIN — ATORVASTATIN CALCIUM 40 MG: 20 TABLET, FILM COATED ORAL at 09:08

## 2024-11-09 RX ADMIN — DIBASIC SODIUM PHOSPHATE, MONOBASIC POTASSIUM PHOSPHATE AND MONOBASIC SODIUM PHOSPHATE 2 TABLET: 852; 155; 130 TABLET ORAL at 21:14

## 2024-11-09 RX ADMIN — SERTRALINE 100 MG: 100 TABLET, FILM COATED ORAL at 21:14

## 2024-11-09 RX ADMIN — INSULIN HUMAN 4 UNITS: 100 INJECTION, SOLUTION PARENTERAL at 11:18

## 2024-11-09 RX ADMIN — Medication 10 ML: at 09:09

## 2024-11-09 RX ADMIN — LIDOCAINE 1 PATCH: 4 PATCH TOPICAL at 09:28

## 2024-11-09 RX ADMIN — MIDODRINE HYDROCHLORIDE 10 MG: 5 TABLET ORAL at 09:08

## 2024-11-09 RX ADMIN — DIBASIC SODIUM PHOSPHATE, MONOBASIC POTASSIUM PHOSPHATE AND MONOBASIC SODIUM PHOSPHATE 2 TABLET: 852; 155; 130 TABLET ORAL at 11:19

## 2024-11-09 RX ADMIN — MIDODRINE HYDROCHLORIDE 10 MG: 5 TABLET ORAL at 17:35

## 2024-11-09 RX ADMIN — ARFORMOTEROL TARTRATE 15 MCG: 15 SOLUTION RESPIRATORY (INHALATION) at 20:07

## 2024-11-09 RX ADMIN — ARFORMOTEROL TARTRATE 15 MCG: 15 SOLUTION RESPIRATORY (INHALATION) at 08:07

## 2024-11-09 RX ADMIN — HEPARIN SODIUM 5000 UNITS: 5000 INJECTION INTRAVENOUS; SUBCUTANEOUS at 13:37

## 2024-11-09 RX ADMIN — DIBASIC SODIUM PHOSPHATE, MONOBASIC POTASSIUM PHOSPHATE AND MONOBASIC SODIUM PHOSPHATE 2 TABLET: 852; 155; 130 TABLET ORAL at 09:08

## 2024-11-09 RX ADMIN — ACETAMINOPHEN 1000 MG: 500 TABLET ORAL at 09:08

## 2024-11-09 RX ADMIN — DIBASIC SODIUM PHOSPHATE, MONOBASIC POTASSIUM PHOSPHATE AND MONOBASIC SODIUM PHOSPHATE 2 TABLET: 852; 155; 130 TABLET ORAL at 17:35

## 2024-11-09 RX ADMIN — MIDODRINE HYDROCHLORIDE 10 MG: 5 TABLET ORAL at 11:19

## 2024-11-09 RX ADMIN — TORSEMIDE 100 MG: 100 TABLET ORAL at 09:08

## 2024-11-09 RX ADMIN — PANTOPRAZOLE SODIUM 40 MG: 40 INJECTION, POWDER, FOR SOLUTION INTRAVENOUS at 09:09

## 2024-11-09 RX ADMIN — HEPARIN SODIUM 5000 UNITS: 5000 INJECTION INTRAVENOUS; SUBCUTANEOUS at 07:25

## 2024-11-09 RX ADMIN — Medication 10 ML: at 21:16

## 2024-11-09 RX ADMIN — BUDESONIDE 0.5 MG: 0.5 INHALANT ORAL at 20:09

## 2024-11-09 RX ADMIN — Medication: at 09:00

## 2024-11-09 RX ADMIN — INSULIN HUMAN 2 UNITS: 100 INJECTION, SOLUTION PARENTERAL at 07:25

## 2024-11-09 RX ADMIN — LEVOTHYROXINE SODIUM 175 MCG: 175 TABLET ORAL at 07:25

## 2024-11-09 NOTE — PROGRESS NOTES
Name: Nelson Wang ADMIT: 2024   : 1945  PCP: Domingo Bravo MD    MRN: [id number] LOS: 8 days   AGE/SEX: 79 y.o. male  ROOM: [facility]     Subjective   Subjective   Patient reports no low back pain but positive lower extremity weakness and generalized weakness.  No fever or chills.    Review of Systems  Cardiovascular/respiratory.  No chest pain/no palpitations/no shortness of breath/no cough/no wheeze/no hemoptysis  .  No dysuria or hematuria.  GI.  Normal bowel movement today.  No abdominal pain.  No nausea or vomiting.         Objective   Objective   Vital Signs  Temp:  [97.4 °F (36.3 °C)-99.3 °F (37.4 °C)] 97.4 °F (36.3 °C)  Heart Rate:  [82-97] 93  Resp:  [0-18] 18  BP: (135-190)/(48-69) 190/69  SpO2:  [78 %-100 %] 92 %  on  Flow (L/min) (Oxygen Therapy):  [1] 1;   Device (Oxygen Therapy): room air    Intake/Output Summary (Last 24 hours) at 2024 1756  Last data filed at 2024 0811  Gross per 24 hour   Intake 563 ml   Output --   Net 563 ml     Body mass index is 26.17 kg/m².      24  0600 24  0621 24  0640   Weight: 68.9 kg (151 lb 14.4 oz) 72.7 kg (160 lb 3.2 oz) 73 kg (160 lb 14.4 oz)     Physical Exam  General.  Elderly gentleman.  Is alert and oriented times 3 out of 4.  No apparent pain/distress/diaphoresis.  Normal mood and affect.  Eyes.  Pupils equal round and reactive.  Intact extraocular musculature.  No pallor or jaundice  Oral cavity.  Moist mucous membrane.  Nose.  Core track in place.  Neck.  Supple.  No JVD.  No lymphadenopathy or thyromegaly.  Chest.  Poor bilateral air entry with bilateral rhonchi.  Abdomen.  Soft lax.  No tenderness.  No organomegaly.  No guarding or rebound  Extremities.  No clubbing/cyanosis/edema  CNS.  No acute focal neurological deficits.    Results Review:      Results from last 7 days   Lab Units 24  0356 24  0534 24  0631 24  0527 24  0556 24  0349 24  0531   SODIUM mmol/L 135*  "132* 132* 135* 134* 137 138   POTASSIUM mmol/L 3.9 3.7 3.2* 3.5 3.4* 3.9 3.6   CHLORIDE mmol/L 98 98 99 102 102 103 103   CO2 mmol/L 23.0 22.0 25.3 21.3* 21.4* 21.4* 22.0   BUN mg/dL 52* 47* 24* 38* 23 11 13   CREATININE mg/dL 5.87* 5.42* 3.99* 7.39* 5.14* 2.92* 2.93*   GLUCOSE mg/dL 137* 166* 180* 156* 161* 147* 360*   CALCIUM mg/dL 8.2* 8.4* 8.5* 9.0 8.9 9.3 8.3*   AST (SGOT) U/L  --   --   --  19  --   --   --    ALT (SGPT) U/L  --   --   --  <5  --   --   --      Estimated Creatinine Clearance: 10.7 mL/min (A) (by C-G formula based on SCr of 5.87 mg/dL (H)).      Results from last 7 days   Lab Units 11/09/24  1517 11/09/24  1112 11/09/24  0527 11/08/24  2026 11/08/24  1616 11/08/24  1117 11/08/24  0632 11/08/24  0008   GLUCOSE mg/dL 134* 260* 157* 147* 150* 218* 183* 189*                 Results from last 7 days   Lab Units 11/09/24  0356 11/07/24  0631 11/06/24  0527 11/05/24  0556 11/03/24  0531   MAGNESIUM mg/dL  --   --  2.3  --  2.2   PHOSPHORUS mg/dL 1.8*   < > 2.1*   < >  --     < > = values in this interval not displayed.           Invalid input(s): \"LDLCALC\"  Results from last 7 days   Lab Units 11/09/24  0356 11/08/24  0534 11/07/24  0445 11/06/24  0527 11/05/24  0556 11/04/24  0349 11/03/24  0532   WBC 10*3/mm3 6.66 7.23 5.72 5.82 4.15 6.86 6.03   HEMOGLOBIN g/dL 8.4* 8.3* 8.0* 8.4* 8.5* 9.3* 8.4*   HEMATOCRIT % 26.5* 26.8* 27.2* 27.4* 27.4* 30.2* 27.6*   PLATELETS 10*3/mm3 149 142 143 138* 126* 139* 164   MCV fL 89.5 93.4 93.8 91.6 92.6 90.7 92.3   MCH pg 28.4 28.9 27.6 28.1 28.7 27.9 28.1   MCHC g/dL 31.7 31.0* 29.4* 30.7* 31.0* 30.8* 30.4*   RDW % 14.8 14.7 14.5 14.2 14.5 14.0 13.9   RDW-SD fl 47.5 49.5 49.5 47.9 48.3 46.2 46.8   MPV fL 11.3 10.5 10.7 9.8 10.3 9.0 9.8   NEUTROPHIL % % 61.1 66.8 62.3 57.6 46.5  --  60.4   LYMPHOCYTE % % 22.2 19.5* 26.0 30.9 38.3  --  20.9   MONOCYTES % % 12.3* 10.2 7.9 8.9 11.6  --  12.9*   EOSINOPHIL % % 2.1 1.9 2.6 2.1 2.9  --  5.3   BASOPHIL % % 0.5 0.4 0.2 0.2 " 0.2  --  0.2   IMM GRAN % % 1.8* 1.2* 1.0* 0.3 0.5  --  0.3   NEUTROS ABS 10*3/mm3 4.07 4.82 3.56 3.35 1.93  --  3.64   LYMPHS ABS 10*3/mm3 1.48 1.41 1.49 1.80 1.59  --  1.26   MONOS ABS 10*3/mm3 0.82 0.74 0.45 0.52 0.48  --  0.78   EOS ABS 10*3/mm3 0.14 0.14 0.15 0.12 0.12  --  0.32   BASOS ABS 10*3/mm3 0.03 0.03 0.01 0.01 0.01  --  0.01   IMMATURE GRANS (ABS) 10*3/mm3 0.12* 0.09* 0.06* 0.02 0.02  --  0.02   NRBC /100 WBC 0.0 0.0  --  0.0 0.0  --  0.0                     Results from last 7 days   Lab Units 11/05/24  0556   AMMONIA umol/L 38     Results from last 7 days   Lab Units 11/02/24  2032   BLOODCX  No growth at 5 days                   Imaging:  Imaging Results (Last 24 Hours)       ** No results found for the last 24 hours. **               I reviewed the patient's new clinical results / labs / tests / procedures      Assessment/Plan     Active Hospital Problems    Diagnosis  POA    **Discitis of lumbar region [M46.46]  Yes    Severe malnutrition [E43]  Yes    Discitis [M46.40]  Yes    Metabolic encephalopathy [G93.41]  Yes    Aspiration pneumonia [J69.0]  Yes    ESRD on dialysis [N18.6, Z99.2]  Not Applicable    Bipolar disorder [F31.9]  Yes    Chronic respiratory failure with hypoxia [J96.11]  Yes    Essential hypertension [I10]  Yes    Anemia due to chronic kidney disease [N18.9, D63.1]  Yes    Type 2 diabetes mellitus without complication [E11.9]  Yes      Resolved Hospital Problems   No resolved problems to display.           Coag negative staph Bacteremia / L1/2 discitis , T5/6 discitis.   Blood cultures 10/31/2024 positive for coag negative staph.  Repeat blood cultures are negative.  IV Rocephin DC'd and currently on IV vancomycin per infectious disease. MRI of the cervical spine/thoracic/lumbar-limited studies but thought to have L1/2 discitis along with T5/6 discitis.TDC catheter removed 11/3/2024 as likely source of infection.  Neurosurgery evaluated, no intervention planned, plan for repeat  "MRI of lumbar and thoracic spine with and without contrast in 3 months. Echocardiogram 11/4/24-normal left ventricular function, EF 56 to 60%, no findings of vegetation/infection ID recommended \"vancomycin dosing per pharmacy with dialysis only x 6 weeks.  Antibiotic stop date December 14/2024 with monitor weekly CBC with differential and vancomycin troughs and fax the results to the infectious disease clinic at [phone].  Arrange for ID follow-up on December 13\"  Hypotension in a patient with a history of hypertension..  Blood pressure is on the high side.  No evidence of angina or congestive heart failure.  Will decrease midodrine.  Monitor blood pressure.  If blood pressure remains elevated will consider resuming Coreg.  Metabolic encephalopathy/history of bipolar disorder.  Negative CNS examination for focal deficits.CT head 10/8/2024 with no acute findings.  Psychiatry following.  On as needed Zyprexa.  Risperidone was changed to Abilify.  No previous history of dementia.restraints removed 11/6/2024  Patient more awake and alert after stopping Neurontin, decreasing Percocet to 2.5 p.o. every 6 hours and stopping amitriptyline.  hypoxemia/COPD. CT scan 10/41/24 mild patchy and consolidative airspace opacities in the right lower lobe with small adjacent pleural effusion new from previous comparison. Differential includes atelectasis versus early developing infection.  Continue arformoterol, budesonide nebulize.  Stable respiratory status     ESRD on HD has been on home hemodialysis receiving 5 times a week . Nephrology managing dialysis.  Appears euvolemic.TDC catheter removed 11/3/2024 per vascular as likely source of infection.  Currently on dialysis holiday.  Vascular surgery evaluated viability of his left AV fistula,Fistula based upon duplex criteria appears mature and able to be utilized.  Plan to attempt follow-up cannulization, however if fails due to technical issues/agitation, will need repeat TDC " catheter placement.  Gastritis/ Abdominal pain.  Resolved.  Benign GI examination.EGD 10/28/2024 showed gastritis.  Continue IV PPI twice daily  Hypothyroidism.  Continue levothyroxine 175 mcg daily.  Check TSH.  Diabetes mellitus.  Continue SSI.  Hypoglycemic protocol.  Check A1c.   Anemia of chronic disease stable hemoglobin  around 8. Monitor and transfuse as indicated for symptomatic anemia or hemoglobin less than 7  On tube feeds.        Discussed my findings and plan of treatment with the patient/daughter.  Disposition.  To SNF eventually.    Kaci Camilo MD  [facility]  11/09/24  17:56 EST

## 2024-11-09 NOTE — PROGRESS NOTES
"[facility] Clinical Pharmacy Services: Vancomycin Monitoring Note    Nelson Wang is a 79 y.o. male who is on day 9 of pharmacy to dose vancomycin for for L1/2 and T5/6 discitis.  Stop date: 12/13/24     Previous Vancomycin Dose: 750 mg IV once on 11/6 at 2113, intermittent dosing    Updated Cultures and Sensitivities:   10/31: BCx-CoN Staph (2 of 2 bottles)  10/31: MRSA PCR-negative  11/2: BCx-negative    Results from last 7 days   Lab Units 11/09/24 0356 11/08/24  0534 11/07/24  0445   VANCOMYCIN RM mcg/mL 23.00 17.30 22.10     Vitals/Labs  Ht: 167 cm (65.75\"); Wt: 73 kg (160 lb 14.4 oz)   Temp Readings from Last 1 Encounters:   11/09/24 98 °F (36.7 °C) (Oral)     Estimated Creatinine Clearance: 10.7 mL/min (A) (by C-G formula based on SCr of 5.87 mg/dL (H)).   Results from last 7 days   Lab Units 11/09/24 0356 11/08/24 0534 11/07/24  0631 11/07/24  0445   CREATININE mg/dL 5.87* 5.42* 3.99*  --    WBC 10*3/mm3 6.66 7.23  --  5.72     Assessment/Plan    Continue vancomycin intermittent dosing with HD. Patient had HD session yesterday interrupted due to agitation & arterial line infiltration. No HD planned today. Next anticipated session Monday 11/11. No dose indicated at this time. Will need post-HD dose after next HD session (not currently ordered) based on vancomycin level today with AM labs.   Next Level Date and Time: TBD (not currently ordered.   We will continue to monitor patient changes and dialysis schedule.     Thank you for involving pharmacy in this patient's care. Please contact pharmacy with any questions or concerns.       Rigo Temple, PharmD  11/09/24 12:05 EST    "

## 2024-11-09 NOTE — PLAN OF CARE
Problem: Comorbidity Management  Goal: Blood Glucose Level Within Target Range  Outcome: Progressing     Problem: Skin Injury Risk Increased  Goal: Skin Health and Integrity  Outcome: Progressing  Intervention: Optimize Skin Protection  Recent Flowsheet Documentation  Taken 11/9/2024 1413 by Rustam Rush RN  Activity Management: up in chair  Pressure Reduction Techniques: frequent weight shift encouraged  Taken 11/9/2024 1141 by Rustam Rush RN  Activity Management: back to bed  Taken 11/9/2024 1020 by Rustam Rush RN  Activity Management: up in chair  Taken 11/9/2024 0910 by Rustam Rush RN  Activity Management: up in chair  Pressure Reduction Techniques:   frequent weight shift encouraged   weight shift assistance provided     Problem: Fall Injury Risk  Goal: Absence of Fall and Fall-Related Injury  Outcome: Progressing  Intervention: Promote Injury-Free Environment  Recent Flowsheet Documentation  Taken 11/9/2024 1600 by Rustam Rush RN  Safety Promotion/Fall Prevention:   activity supervised   assistive device/personal items within reach   clutter free environment maintained   fall prevention program maintained   nonskid shoes/slippers when out of bed   gait belt   room organization consistent   safety round/check completed  Taken 11/9/2024 1413 by Rustam Rush RN  Safety Promotion/Fall Prevention:   activity supervised   assistive device/personal items within reach   clutter free environment maintained   fall prevention program maintained   nonskid shoes/slippers when out of bed   gait belt   room organization consistent   safety round/check completed  Taken 11/9/2024 1141 by Rustam Rush RN  Safety Promotion/Fall Prevention:   activity supervised   assistive device/personal items within reach   clutter free environment maintained   fall prevention program maintained   nonskid shoes/slippers when out of bed   gait belt   room organization consistent   safety round/check  completed  Taken 11/9/2024 1020 by Rustam Rush RN  Safety Promotion/Fall Prevention:   activity supervised   assistive device/personal items within reach   clutter free environment maintained   fall prevention program maintained   nonskid shoes/slippers when out of bed   gait belt   room organization consistent   safety round/check completed  Taken 11/9/2024 0910 by Rustam Rush RN  Safety Promotion/Fall Prevention:   activity supervised   assistive device/personal items within reach   clutter free environment maintained   fall prevention program maintained   nonskid shoes/slippers when out of bed   gait belt   room organization consistent   safety round/check completed  Taken 11/9/2024 0730 by Rustam Rush RN  Safety Promotion/Fall Prevention:   activity supervised   assistive device/personal items within reach   clutter free environment maintained   fall prevention program maintained   nonskid shoes/slippers when out of bed   gait belt   room organization consistent   safety round/check completed     Problem: Violence Risk or Actual  Goal: Anger and Impulse Control  Outcome: Progressing  Intervention: Minimize Safety Risk  Recent Flowsheet Documentation  Taken 11/9/2024 1600 by Rustam Rush RN  Enhanced Safety Measures: bed alarm set  Taken 11/9/2024 1413 by Rustam Rush RN  Enhanced Safety Measures: chair alarm set  Taken 11/9/2024 1141 by Rustam Rush RN  Enhanced Safety Measures: bed alarm set  Taken 11/9/2024 1020 by Rustam Rush RN  Enhanced Safety Measures: chair alarm set  Taken 11/9/2024 0910 by Rustam Rush RN  Enhanced Safety Measures: chair alarm set  Taken 11/9/2024 0730 by Rustam Rush RN  Enhanced Safety Measures: bed alarm set   Goal Outcome Evaluation:         VSS. Room Air. Pleasant all day. Had more of an appetite than normal, per family. No reports of pain.

## 2024-11-09 NOTE — PLAN OF CARE
Problem: Adult Inpatient Plan of Care  Goal: Plan of Care Review  Outcome: Adequate for Care Transition  Flowsheets (Taken 11/9/2024 0600)  Progress: no change  Outcome Evaluation: No acute events overnight. Sleeping between care. Overnight TF started per order, tolerating well. VSS. Turns and skin care. Loose stools x 2 this shift. Plan of care updated. Family remains at bedside. Continue safety measures and progress towards goals.  Plan of Care Reviewed With: patient  Goal: Patient-Specific Goal (Individualized)  Outcome: Adequate for Care Transition  Goal: Absence of Hospital-Acquired Illness or Injury  Outcome: Adequate for Care Transition  Intervention: Identify and Manage Fall Risk  Recent Flowsheet Documentation  Taken 11/9/2024 0400 by Emma Jackson, RN  Safety Promotion/Fall Prevention:   safety round/check completed   activity supervised   assistive device/personal items within reach   fall prevention program maintained   nonskid shoes/slippers when out of bed  Taken 11/9/2024 0215 by Emma Jackson, RN  Safety Promotion/Fall Prevention:   safety round/check completed   activity supervised   assistive device/personal items within reach   fall prevention program maintained   nonskid shoes/slippers when out of bed  Taken 11/9/2024 0000 by Emma Jackson, RN  Safety Promotion/Fall Prevention:   safety round/check completed   activity supervised   assistive device/personal items within reach   fall prevention program maintained   nonskid shoes/slippers when out of bed  Taken 11/8/2024 2200 by Emma Jackson, RN  Safety Promotion/Fall Prevention:   safety round/check completed   activity supervised   assistive device/personal items within reach   fall prevention program maintained   nonskid shoes/slippers when out of bed  Taken 11/8/2024 2000 by Emma Jackson, RN  Safety Promotion/Fall Prevention:   safety round/check completed   activity supervised   assistive device/personal items within reach   fall  prevention program maintained   nonskid shoes/slippers when out of bed  Intervention: Prevent Skin Injury  Recent Flowsheet Documentation  Taken 11/9/2024 0215 by Emma Jackson RN  Body Position: (pt turns self back to L side after repositioning)   side-lying   left   weight shifting  Taken 11/9/2024 0000 by Emma Jackson RN  Body Position: weight shifting  Taken 11/8/2024 2000 by Emma Jackson RN  Body Position:   position changed independently   turned   weight shifting  Skin Protection:   skin sealant/moisture barrier applied   incontinence pads utilized  Intervention: Prevent and Manage VTE (Venous Thromboembolism) Risk  Recent Flowsheet Documentation  Taken 11/9/2024 0000 by Emma Jackson RN  VTE Prevention/Management: (SQ heparin) other (see comments)  Intervention: Prevent Infection  Recent Flowsheet Documentation  Taken 11/9/2024 0400 by Emma Jackson RN  Infection Prevention:   rest/sleep promoted   personal protective equipment utilized  Taken 11/9/2024 0215 by Emma Jackson RN  Infection Prevention:   rest/sleep promoted   personal protective equipment utilized  Taken 11/9/2024 0000 by Emma Jackson RN  Infection Prevention:   rest/sleep promoted   personal protective equipment utilized  Taken 11/8/2024 2200 by Emma Jackson RN  Infection Prevention:   rest/sleep promoted   personal protective equipment utilized  Taken 11/8/2024 2000 by Emma Jackson RN  Infection Prevention:   rest/sleep promoted   personal protective equipment utilized  Goal: Optimal Comfort and Wellbeing  Outcome: Adequate for Care Transition  Intervention: Provide Person-Centered Care  Recent Flowsheet Documentation  Taken 11/9/2024 0000 by Emma Jackson RN  Trust Relationship/Rapport:   care explained   choices provided   questions answered   reassurance provided   thoughts/feelings acknowledged  Taken 11/8/2024 2000 by Emma Jackson RN  Trust Relationship/Rapport:   care explained   choices  provided   questions answered   reassurance provided   thoughts/feelings acknowledged  Goal: Readiness for Transition of Care  Outcome: Adequate for Care Transition   Goal Outcome Evaluation:  Plan of Care Reviewed With: patient        Progress: no change  Outcome Evaluation: No acute events overnight. Sleeping between care. Overnight TF started per order, tolerating well. VSS. Turns and skin care. Loose stools x 2 this shift. Plan of care updated. Family remains at bedside. Continue safety measures and progress towards goals.

## 2024-11-09 NOTE — PROGRESS NOTES
[facility] Progress Note      Patient Name: Nelson Wang  : 1945  MRN: [id number]  Primary Care Physician:  Domingo Bravo MD  Date of admission: 2024    Subjective     Interval History:   F/u ESRD     The patient had dialysis yesterday unfortunately he became agitated and he had an infiltration and the dialysis was stopped.  He is more awake today tolerating his tube feed.  Review of Systems:   As noted above    Objective     Vitals:   Temp:  [97.7 °F (36.5 °C)-99.3 °F (37.4 °C)] 98 °F (36.7 °C)  Heart Rate:  [81-97] 92  Resp:  [0-18] 18  BP: ()/(48-75) 137/66  Flow (L/min) (Oxygen Therapy):  [1] 1    Intake/Output Summary (Last 24 hours) at 2024 1112  Last data filed at 2024 0811  Gross per 24 hour   Intake 683 ml   Output --   Net 683 ml       Physical Exam:    General Appearance: Chronically ill, awake, frail  HEENT: Core track in place  Neck: No JVD  Lungs: Scattered rhonchi, unlabored breathing  Heart: RRR, no rub  Abdomen: soft, nontender, nondistended  Extremities: no edema, LUE AVF with thrill and bruit he has hematoma over the AV fistula.  Neuro: Moving all extremities speech improved    Scheduled Meds:     acetaminophen, 1,000 mg, Nasogastric, Q8H  arformoterol, 15 mcg, Nebulization, BID - RT  ARIPiprazole, 2 mg, Oral, BID  atorvastatin, 40 mg, Oral, Daily  budesonide, 0.5 mg, Nebulization, BID - RT  Check Fentanyl Patch Placement, 1 each, Does not apply, Q12H  heparin (porcine), 5,000 Units, Subcutaneous, Q8H  insulin regular, 2-7 Units, Subcutaneous, Q6H  levothyroxine, 175 mcg, Nasogastric, Q AM  Lidocaine, 1 patch, Transdermal, Q24H  midodrine, 10 mg, Oral, TID AC  pantoprazole, 40 mg, Intravenous, BID AC  phosphorus, 500 mg, Nasogastric, 4x Daily  senna-docusate sodium, 2 tablet, Nasogastric, Daily  sertraline, 100 mg, Oral, Nightly  sodium chloride, 10 mL, Intravenous, Q12H  torsemide, 100 mg, Oral, Daily  Vancomycin Pharmacy  Intermittent/Pulse Dosing, , Does not apply, Daily      IV Meds:   Pharmacy to dose vancomycin,         Results Reviewed:   I have personally reviewed the results from the time of this admission to 11/9/2024 11:12 EST     Results from last 7 days   Lab Units 11/09/24 0356 11/08/24 0534 11/07/24 0631 11/06/24 0527   SODIUM mmol/L 135* 132* 132* 135*   POTASSIUM mmol/L 3.9 3.7 3.2* 3.5   CHLORIDE mmol/L 98 98 99 102   CO2 mmol/L 23.0 22.0 25.3 21.3*   BUN mg/dL 52* 47* 24* 38*   CREATININE mg/dL 5.87* 5.42* 3.99* 7.39*   CALCIUM mg/dL 8.2* 8.4* 8.5* 9.0   BILIRUBIN mg/dL  --   --   --  0.4   ALK PHOS U/L  --   --   --  70   ALT (SGPT) U/L  --   --   --  <5   AST (SGOT) U/L  --   --   --  19   GLUCOSE mg/dL 137* 166* 180* 156*     Estimated Creatinine Clearance: 10.7 mL/min (A) (by C-G formula based on SCr of 5.87 mg/dL (H)).  Results from last 7 days   Lab Units 11/09/24 0356 11/08/24 0534 11/07/24 0631 11/06/24 0527 11/05/24 0556 11/03/24 0531   MAGNESIUM mg/dL  --   --   --  2.3  --  2.2   PHOSPHORUS mg/dL 1.8* 1.0* 1.0* 2.1*   < >  --     < > = values in this interval not displayed.         Results from last 7 days   Lab Units 11/09/24 0356 11/08/24 0534 11/07/24 0445 11/06/24 0527 11/05/24  0556   WBC 10*3/mm3 6.66 7.23 5.72 5.82 4.15   HEMOGLOBIN g/dL 8.4* 8.3* 8.0* 8.4* 8.5*   PLATELETS 10*3/mm3 149 142 143 138* 126*           Assessment / Plan     ASSESSMENT:  End-stage renal disease - been on home hemodialysis receiving 5 times a week (Dr Castro follows), via [place] TDC due to LUE AVF dysfunction (placed in July).  Catheter was removed, his volume status appears to be euvolemic, his electrolyte within acceptable range dialysis was done yesterday but has to be stopped short because of agitation and infiltration of the AV fistula.    Anemia of chronic kidney disease on long-acting RONALD his hemoglobin today is 8.4, platelet 14*9,000 slightly decreased  Hypertension with chronic kidney disease  controlled.  Hypothyroidism on replacement therapy  Acute encephalopathy   T5/6 discitis/osteomyelitis -followed by MRI with contrast 11/2 shows L1/2 & T5/6 discitis  Coag negative staph bacteremia currently on vancomycin  Hypophosphatemia, phosphorus is up to 1 point    PLAN:  Continue the same treatment  Replete phosphorus  Surveillance labs  Will hold off dialysis today and hopefully tomorrow and will attempt to do dialysis again on Monday if his condition allows.    I reviewed the chart and other providers notes, reviewed labs.  Discussed the case with the patient's daughter and his wife at the bedside.  Copied text in this note has been reviewed and is accurate as of 11/09/24.           Bart Wolf MD  11/09/24  11:12 [facility]    [facility]  [phone]

## 2024-11-10 LAB
ABO GROUP BLD: NORMAL
ALBUMIN SERPL-MCNC: 2.8 G/DL (ref 3.5–5.2)
ALP SERPL-CCNC: 70 U/L (ref 39–117)
ALT SERPL W P-5'-P-CCNC: 6 U/L (ref 1–41)
ANION GAP SERPL CALCULATED.3IONS-SCNC: 15.3 MMOL/L (ref 5–15)
AST SERPL-CCNC: 18 U/L (ref 1–40)
BASOPHILS # BLD AUTO: 0.02 10*3/MM3 (ref 0–0.2)
BASOPHILS NFR BLD AUTO: 0.3 % (ref 0–1.5)
BILIRUB CONJ SERPL-MCNC: 0.2 MG/DL (ref 0–0.3)
BILIRUB INDIRECT SERPL-MCNC: 0.2 MG/DL
BILIRUB SERPL-MCNC: 0.4 MG/DL (ref 0–1.2)
BLD GP AB SCN SERPL QL: NEGATIVE
BUN SERPL-MCNC: 74 MG/DL (ref 8–23)
BUN/CREAT SERPL: 11.1 (ref 7–25)
CALCIUM SPEC-SCNC: 8.3 MG/DL (ref 8.6–10.5)
CHLORIDE SERPL-SCNC: 94 MMOL/L (ref 98–107)
CO2 SERPL-SCNC: 22.7 MMOL/L (ref 22–29)
CREAT SERPL-MCNC: 6.65 MG/DL (ref 0.76–1.27)
DEPRECATED RDW RBC AUTO: 49.7 FL (ref 37–54)
EGFRCR SERPLBLD CKD-EPI 2021: 7.9 ML/MIN/1.73
EOSINOPHIL # BLD AUTO: 0.19 10*3/MM3 (ref 0–0.4)
EOSINOPHIL NFR BLD AUTO: 3.3 % (ref 0.3–6.2)
ERYTHROCYTE [DISTWIDTH] IN BLOOD BY AUTOMATED COUNT: 15.2 % (ref 12.3–15.4)
GLUCOSE BLDC GLUCOMTR-MCNC: 127 MG/DL (ref 70–130)
GLUCOSE BLDC GLUCOMTR-MCNC: 169 MG/DL (ref 70–130)
GLUCOSE BLDC GLUCOMTR-MCNC: 192 MG/DL (ref 70–130)
GLUCOSE BLDC GLUCOMTR-MCNC: 222 MG/DL (ref 70–130)
GLUCOSE SERPL-MCNC: 194 MG/DL (ref 65–99)
HCT VFR BLD AUTO: 23.8 % (ref 37.5–51)
HGB BLD-MCNC: 7.2 G/DL (ref 13–17.7)
IMM GRANULOCYTES # BLD AUTO: 0.1 10*3/MM3 (ref 0–0.05)
IMM GRANULOCYTES NFR BLD AUTO: 1.7 % (ref 0–0.5)
LYMPHOCYTES # BLD AUTO: 1.22 10*3/MM3 (ref 0.7–3.1)
LYMPHOCYTES NFR BLD AUTO: 21 % (ref 19.6–45.3)
MCH RBC QN AUTO: 27.7 PG (ref 26.6–33)
MCHC RBC AUTO-ENTMCNC: 30.3 G/DL (ref 31.5–35.7)
MCV RBC AUTO: 91.5 FL (ref 79–97)
MONOCYTES # BLD AUTO: 0.85 10*3/MM3 (ref 0.1–0.9)
MONOCYTES NFR BLD AUTO: 14.6 % (ref 5–12)
NEUTROPHILS NFR BLD AUTO: 3.44 10*3/MM3 (ref 1.7–7)
NEUTROPHILS NFR BLD AUTO: 59.1 % (ref 42.7–76)
NRBC BLD AUTO-RTO: 0 /100 WBC (ref 0–0.2)
PHOSPHATE SERPL-MCNC: 4.2 MG/DL (ref 2.5–4.5)
PLATELET # BLD AUTO: 159 10*3/MM3 (ref 140–450)
PMV BLD AUTO: 10.7 FL (ref 6–12)
POTASSIUM SERPL-SCNC: 3.3 MMOL/L (ref 3.5–5.2)
PROT SERPL-MCNC: 6.2 G/DL (ref 6–8.5)
QT INTERVAL: 402 MS
QT INTERVAL: 476 MS
QTC INTERVAL: 459 MS
QTC INTERVAL: 540 MS
RBC # BLD AUTO: 2.6 10*6/MM3 (ref 4.14–5.8)
RH BLD: POSITIVE
SODIUM SERPL-SCNC: 132 MMOL/L (ref 136–145)
T&S EXPIRATION DATE: NORMAL
WBC NRBC COR # BLD AUTO: 5.82 10*3/MM3 (ref 3.4–10.8)

## 2024-11-10 PROCEDURE — 80076 HEPATIC FUNCTION PANEL: CPT | Performed by: INTERNAL MEDICINE

## 2024-11-10 PROCEDURE — 80048 BASIC METABOLIC PNL TOTAL CA: CPT | Performed by: INTERNAL MEDICINE

## 2024-11-10 PROCEDURE — 86901 BLOOD TYPING SEROLOGIC RH(D): CPT

## 2024-11-10 PROCEDURE — 86900 BLOOD TYPING SEROLOGIC ABO: CPT

## 2024-11-10 PROCEDURE — 86923 COMPATIBILITY TEST ELECTRIC: CPT

## 2024-11-10 PROCEDURE — 86901 BLOOD TYPING SEROLOGIC RH(D): CPT | Performed by: INTERNAL MEDICINE

## 2024-11-10 PROCEDURE — 82948 REAGENT STRIP/BLOOD GLUCOSE: CPT

## 2024-11-10 PROCEDURE — 86900 BLOOD TYPING SEROLOGIC ABO: CPT | Performed by: INTERNAL MEDICINE

## 2024-11-10 PROCEDURE — 94799 UNLISTED PULMONARY SVC/PX: CPT

## 2024-11-10 PROCEDURE — 84100 ASSAY OF PHOSPHORUS: CPT | Performed by: INTERNAL MEDICINE

## 2024-11-10 PROCEDURE — 94761 N-INVAS EAR/PLS OXIMETRY MLT: CPT

## 2024-11-10 PROCEDURE — 85025 COMPLETE CBC W/AUTO DIFF WBC: CPT | Performed by: INTERNAL MEDICINE

## 2024-11-10 PROCEDURE — 94664 DEMO&/EVAL PT USE INHALER: CPT

## 2024-11-10 PROCEDURE — 63710000001 INSULIN REGULAR HUMAN PER 5 UNITS: Performed by: NURSE PRACTITIONER

## 2024-11-10 PROCEDURE — 25010000002 HEPARIN (PORCINE) PER 1000 UNITS: Performed by: STUDENT IN AN ORGANIZED HEALTH CARE EDUCATION/TRAINING PROGRAM

## 2024-11-10 PROCEDURE — 86850 RBC ANTIBODY SCREEN: CPT | Performed by: INTERNAL MEDICINE

## 2024-11-10 RX ORDER — LIDOCAINE 4 G/G
1 PATCH TOPICAL
Status: DISCONTINUED | OUTPATIENT
Start: 2024-11-10 | End: 2024-11-18 | Stop reason: HOSPADM

## 2024-11-10 RX ORDER — SIMETHICONE 80 MG
80 TABLET,CHEWABLE ORAL 4 TIMES DAILY PRN
Status: DISCONTINUED | OUTPATIENT
Start: 2024-11-10 | End: 2024-11-18 | Stop reason: HOSPADM

## 2024-11-10 RX ORDER — SACCHAROMYCES BOULARDII 250 MG
250 CAPSULE ORAL 2 TIMES DAILY
Status: DISCONTINUED | OUTPATIENT
Start: 2024-11-10 | End: 2024-11-18 | Stop reason: HOSPADM

## 2024-11-10 RX ORDER — OXYCODONE HCL 5 MG/5 ML
5 SOLUTION, ORAL ORAL EVERY 4 HOURS PRN
Status: DISCONTINUED | OUTPATIENT
Start: 2024-11-10 | End: 2024-11-11

## 2024-11-10 RX ORDER — MANNITOL 250 MG/ML
12.5 INJECTION, SOLUTION INTRAVENOUS AS NEEDED
Status: CANCELLED | OUTPATIENT
Start: 2024-11-11 | End: 2024-11-11

## 2024-11-10 RX ADMIN — SERTRALINE 100 MG: 100 TABLET, FILM COATED ORAL at 20:54

## 2024-11-10 RX ADMIN — ARIPIPRAZOLE 2 MG: 2 TABLET ORAL at 08:38

## 2024-11-10 RX ADMIN — MIDODRINE HYDROCHLORIDE 5 MG: 5 TABLET ORAL at 08:38

## 2024-11-10 RX ADMIN — ARFORMOTEROL TARTRATE 15 MCG: 15 SOLUTION RESPIRATORY (INHALATION) at 07:37

## 2024-11-10 RX ADMIN — ACETAMINOPHEN 1000 MG: 500 TABLET ORAL at 08:38

## 2024-11-10 RX ADMIN — INSULIN HUMAN 2 UNITS: 100 INJECTION, SOLUTION PARENTERAL at 00:57

## 2024-11-10 RX ADMIN — HEPARIN SODIUM 5000 UNITS: 5000 INJECTION INTRAVENOUS; SUBCUTANEOUS at 13:45

## 2024-11-10 RX ADMIN — ARFORMOTEROL TARTRATE 15 MCG: 15 SOLUTION RESPIRATORY (INHALATION) at 20:09

## 2024-11-10 RX ADMIN — HEPARIN SODIUM 5000 UNITS: 5000 INJECTION INTRAVENOUS; SUBCUTANEOUS at 23:34

## 2024-11-10 RX ADMIN — INSULIN HUMAN 2 UNITS: 100 INJECTION, SOLUTION PARENTERAL at 11:54

## 2024-11-10 RX ADMIN — OXYCODONE HYDROCHLORIDE 5 MG: 5 SOLUTION ORAL at 20:54

## 2024-11-10 RX ADMIN — PANTOPRAZOLE SODIUM 40 MG: 40 INJECTION, POWDER, FOR SOLUTION INTRAVENOUS at 17:26

## 2024-11-10 RX ADMIN — Medication 5 MG: at 23:49

## 2024-11-10 RX ADMIN — Medication 10 ML: at 22:30

## 2024-11-10 RX ADMIN — Medication 5 MG: at 01:04

## 2024-11-10 RX ADMIN — HEPARIN SODIUM 5000 UNITS: 5000 INJECTION INTRAVENOUS; SUBCUTANEOUS at 06:43

## 2024-11-10 RX ADMIN — SIMETHICONE 80 MG: 80 TABLET, CHEWABLE ORAL at 23:35

## 2024-11-10 RX ADMIN — OXYCODONE HYDROCHLORIDE 5 MG: 5 SOLUTION ORAL at 08:37

## 2024-11-10 RX ADMIN — DIBASIC SODIUM PHOSPHATE, MONOBASIC POTASSIUM PHOSPHATE AND MONOBASIC SODIUM PHOSPHATE 2 TABLET: 852; 155; 130 TABLET ORAL at 11:18

## 2024-11-10 RX ADMIN — MIDODRINE HYDROCHLORIDE 5 MG: 5 TABLET ORAL at 17:26

## 2024-11-10 RX ADMIN — INSULIN HUMAN 3 UNITS: 100 INJECTION, SOLUTION PARENTERAL at 06:44

## 2024-11-10 RX ADMIN — Medication 10 ML: at 08:39

## 2024-11-10 RX ADMIN — BUDESONIDE 0.5 MG: 0.5 INHALANT ORAL at 07:37

## 2024-11-10 RX ADMIN — MIDODRINE HYDROCHLORIDE 5 MG: 5 TABLET ORAL at 11:18

## 2024-11-10 RX ADMIN — ACETAMINOPHEN 1000 MG: 500 TABLET ORAL at 00:57

## 2024-11-10 RX ADMIN — LIDOCAINE 1 PATCH: 4 PATCH TOPICAL at 08:37

## 2024-11-10 RX ADMIN — Medication 250 MG: at 23:34

## 2024-11-10 RX ADMIN — ARIPIPRAZOLE 2 MG: 2 TABLET ORAL at 20:54

## 2024-11-10 RX ADMIN — Medication: at 09:07

## 2024-11-10 RX ADMIN — PANTOPRAZOLE SODIUM 40 MG: 40 INJECTION, POWDER, FOR SOLUTION INTRAVENOUS at 08:37

## 2024-11-10 RX ADMIN — OXYCODONE HYDROCHLORIDE 5 MG: 5 SOLUTION ORAL at 13:45

## 2024-11-10 RX ADMIN — LEVOTHYROXINE SODIUM 175 MCG: 175 TABLET ORAL at 06:43

## 2024-11-10 RX ADMIN — DIBASIC SODIUM PHOSPHATE, MONOBASIC POTASSIUM PHOSPHATE AND MONOBASIC SODIUM PHOSPHATE 2 TABLET: 852; 155; 130 TABLET ORAL at 08:38

## 2024-11-10 RX ADMIN — BUDESONIDE 0.5 MG: 0.5 INHALANT ORAL at 20:08

## 2024-11-10 RX ADMIN — ACETAMINOPHEN 1000 MG: 500 TABLET ORAL at 17:26

## 2024-11-10 RX ADMIN — ATORVASTATIN CALCIUM 40 MG: 20 TABLET, FILM COATED ORAL at 08:38

## 2024-11-10 RX ADMIN — TORSEMIDE 100 MG: 100 TABLET ORAL at 08:38

## 2024-11-10 NOTE — PROGRESS NOTES
[facility] Progress Note      Patient Name: Nelson Wang  : 1945  MRN: [id number]  Primary Care Physician:  Domingo Bravo MD  Date of admission: 2024    Subjective     Interval History:   Follow up end stage renal disease      The patient is much more awake and alert, tolerating tube feed he is conversant according to his daughter he spoke to his brother over the phone for over 15 minutes and he was very coherent.  No chest pain, no shortness of breath, no nausea or vomiting, his back pain has improved.    Review of Systems:   As noted above    Objective     Vitals:   Temp:  [97.4 °F (36.3 °C)-98.3 °F (36.8 °C)] 98.3 °F (36.8 °C)  Heart Rate:  [] 111  Resp:  [16-20] 16  BP: (128-190)/(43-75) 128/63    Intake/Output Summary (Last 24 hours) at 11/10/2024 1155  Last data filed at 11/10/2024 0654  Gross per 24 hour   Intake 705 ml   Output --   Net 705 ml       Physical Exam:    General Appearance: Chronically ill, awake, frail  HEENT: Core track in place  Neck: No JVD  Lungs: Scattered rhonchi, unlabored breathing  Heart: RRR, no rub  Abdomen: soft, nontender, nondistended  Extremities: no edema, LUE AVF with thrill and bruit he has hematoma over the AV fistula.  Neuro: Moving all extremities speech improved    Scheduled Meds:     acetaminophen, 1,000 mg, Nasogastric, Q8H  arformoterol, 15 mcg, Nebulization, BID - RT  ARIPiprazole, 2 mg, Oral, BID  atorvastatin, 40 mg, Oral, Daily  budesonide, 0.5 mg, Nebulization, BID - RT  Check Fentanyl Patch Placement, 1 each, Does not apply, Q12H  heparin (porcine), 5,000 Units, Subcutaneous, Q8H  insulin regular, 2-7 Units, Subcutaneous, Q6H  levothyroxine, 175 mcg, Nasogastric, Q AM  Lidocaine, 1 patch, Transdermal, Q24H  midodrine, 5 mg, Oral, TID AC  pantoprazole, 40 mg, Intravenous, BID AC  phosphorus, 500 mg, Nasogastric, 4x Daily  senna-docusate sodium, 2 tablet, Nasogastric, Daily  sertraline, 100 mg, Oral,  Nightly  sodium chloride, 10 mL, Intravenous, Q12H  torsemide, 100 mg, Oral, Daily  Vancomycin Pharmacy Intermittent/Pulse Dosing, , Does not apply, Daily      IV Meds:   Pharmacy to dose vancomycin,         Results Reviewed:   I have personally reviewed the results from the time of this admission to 11/10/2024 11:55 EST     Results from last 7 days   Lab Units 11/10/24  0622 11/09/24  0356 11/08/24 0534 11/07/24 0631 11/06/24 0527   SODIUM mmol/L 132* 135* 132*   < > 135*   POTASSIUM mmol/L 3.3* 3.9 3.7   < > 3.5   CHLORIDE mmol/L 94* 98 98   < > 102   CO2 mmol/L 22.7 23.0 22.0   < > 21.3*   BUN mg/dL 74* 52* 47*   < > 38*   CREATININE mg/dL 6.65* 5.87* 5.42*   < > 7.39*   CALCIUM mg/dL 8.3* 8.2* 8.4*   < > 9.0   BILIRUBIN mg/dL 0.4  --   --   --  0.4   ALK PHOS U/L 70  --   --   --  70   ALT (SGPT) U/L 6  --   --   --  <5   AST (SGOT) U/L 18  --   --   --  19   GLUCOSE mg/dL 194* 137* 166*   < > 156*    < > = values in this interval not displayed.     Estimated Creatinine Clearance: 9.6 mL/min (A) (by C-G formula based on SCr of 6.65 mg/dL (H)).  Results from last 7 days   Lab Units 11/10/24  0622 11/09/24  0356 11/08/24  0534 11/07/24 0631 11/06/24 0527   MAGNESIUM mg/dL  --   --   --   --  2.3   PHOSPHORUS mg/dL 4.2 1.8* 1.0*   < > 2.1*    < > = values in this interval not displayed.         Results from last 7 days   Lab Units 11/10/24  0623 11/09/24  0356 11/08/24  0534 11/07/24  0445 11/06/24 0527   WBC 10*3/mm3 5.82 6.66 7.23 5.72 5.82   HEMOGLOBIN g/dL 7.2* 8.4* 8.3* 8.0* 8.4*   PLATELETS 10*3/mm3 159 149 142 143 138*           Assessment / Plan     ASSESSMENT:  End-stage renal disease - On home hemodialysis as an outpatient, was receiving 5 times a week (Dr Gloria suarez), via [facility] TDC due to LUE AVF dysfunction (placed in July).  Catheter has now been removed. Patient was unable to receive full dialysis treatment on 11/8 due to agitation and AVF needle infiltration.  His electrolyte today showing  sodium 132 and potassium 3.3, appears to be euvolemic   Anemia of chronic kidney disease on long-acting RONALD as an outpatient. Hgb dropped to 7.2 g/dl today, platelets were 159,000.  Hypertension with chronic kidney disease- Last blood pressure recording was elevated.  Hypothyroidism on replacement therapy  Acute encephalopathy   T5/6 discitis/osteomyelitis -followed by MRI with contrast 11/2 shows L1/2 & T5/6 discitis  Coag negative staph bacteremia currently on vancomycin  Hypophosphatemia, improved with replacement, phosphorus up to 4.2 today    PLAN:  Will plan for dialysis today and again tomorrow if his condition allows. Daughter has requested if possible to dialyze in the room so that she or her other sister may help with redirection if the patient does become confused again or tries to move his extremities during HD.   Will order PRBC administration with dialysis tomorrow  Surveillance labs    I reviewed the chart and other providers notes, reviewed labs.  I discussed the case with the patient and his daughter at the bedside  Copied text in this note has been reviewed and is accurate as of 11/10/24.           Bart Wolf MD  11/10/24  11:55 EST    [facility]  [phone]

## 2024-11-10 NOTE — PROGRESS NOTES
Name: Nelson Wang ADMIT: 2024   : 1945  PCP: Domingo Bravo MD    MRN: [id number] LOS: 9 days   AGE/SEX: 79 y.o. male  ROOM: [facility]     Subjective   Subjective   Patient reports no low back pain no lower extremity weakness or numbness.  Positive generalized weakness.  No fever or chills.    Review of Systems  Cardiovascular/respiratory.  No chest pain/no palpitations/no shortness of breath/no cough/no wheeze/no hemoptysis  .  No dysuria or hematuria.  GI.  Normal bowel movement without fresh bright blood per rectum or melena.  No abdominal pain.  No nausea or vomiting.  Slightly improved appetite.         Objective   Objective   Vital Signs  Temp:  [98 °F (36.7 °C)-98.3 °F (36.8 °C)] 98 °F (36.7 °C)  Heart Rate:  [] 109  Resp:  [16-20] 16  BP: (128-190)/(43-76) 134/76  SpO2:  [93 %-99 %] 99 %  on   ;   Device (Oxygen Therapy): room air    Intake/Output Summary (Last 24 hours) at 11/10/2024 1545  Last data filed at 11/10/2024 0654  Gross per 24 hour   Intake 705 ml   Output --   Net 705 ml     Body mass index is 26.68 kg/m².      24  0621 24  0640 11/10/24  0500   Weight: 72.7 kg (160 lb 3.2 oz) 73 kg (160 lb 14.4 oz) 74.4 kg (164 lb 0.4 oz)     Physical Exam  General.  Elderly gentleman.  Is alert and oriented times 4 out of 4.  No apparent pain/distress/diaphoresis.  Normal mood and affect.  Eyes.  Pupils equal round and reactive.  Intact extraocular musculature.  No pallor or jaundice  Oral cavity.  Moist mucous membrane.  Nose.  Core track in place.  Neck.  Supple.  No JVD.  No lymphadenopathy or thyromegaly.  Cardiovascular.  Regular rate and rhythm with occasional ectopic beats and no murmurs.  Chest.  Poor bilateral air entry with scattered bilateral rhonchi.  Abdomen.  Soft lax.  No tenderness.  No organomegaly.  No guarding or rebound  Extremities.  No clubbing/cyanosis/edema  CNS.  No acute focal neurological deficits.    Results Review:      Results from last 7  "days   Lab Units 11/10/24  0622 11/09/24  0356 11/08/24  0534 11/07/24  0631 11/06/24  0527 11/05/24  0556 11/04/24  0349   SODIUM mmol/L 132* 135* 132* 132* 135* 134* 137   POTASSIUM mmol/L 3.3* 3.9 3.7 3.2* 3.5 3.4* 3.9   CHLORIDE mmol/L 94* 98 98 99 102 102 103   CO2 mmol/L 22.7 23.0 22.0 25.3 21.3* 21.4* 21.4*   BUN mg/dL 74* 52* 47* 24* 38* 23 11   CREATININE mg/dL 6.65* 5.87* 5.42* 3.99* 7.39* 5.14* 2.92*   GLUCOSE mg/dL 194* 137* 166* 180* 156* 161* 147*   CALCIUM mg/dL 8.3* 8.2* 8.4* 8.5* 9.0 8.9 9.3   AST (SGOT) U/L 18  --   --   --  19  --   --    ALT (SGPT) U/L 6  --   --   --  <5  --   --      Estimated Creatinine Clearance: 9.6 mL/min (A) (by C-G formula based on SCr of 6.65 mg/dL (H)).  Results from last 7 days   Lab Units 11/09/24 0356   HEMOGLOBIN A1C % 5.90*     Results from last 7 days   Lab Units 11/10/24  1128 11/10/24  0546 11/10/24  0041 11/09/24 2029 11/09/24  1517 11/09/24  1112 11/09/24  0527 11/08/24  2026   GLUCOSE mg/dL 169* 222* 192* 126 134* 260* 157* 147*             Results from last 7 days   Lab Units 11/09/24  0356   TSH uIU/mL 6.430*     Results from last 7 days   Lab Units 11/10/24  0622 11/07/24  0631 11/06/24  0527   MAGNESIUM mg/dL  --   --  2.3   PHOSPHORUS mg/dL 4.2   < > 2.1*    < > = values in this interval not displayed.           Invalid input(s): \"LDLCALC\"  Results from last 7 days   Lab Units 11/10/24  0623 11/09/24  0356 11/08/24  0534 11/07/24  0445 11/06/24  0527 11/05/24  0556 11/04/24  0349 11/04/24 0349   WBC 10*3/mm3 5.82 6.66 7.23 5.72 5.82 4.15  --  6.86   HEMOGLOBIN g/dL 7.2* 8.4* 8.3* 8.0* 8.4* 8.5*  --  9.3*   HEMATOCRIT % 23.8* 26.5* 26.8* 27.2* 27.4* 27.4*  --  30.2*   PLATELETS 10*3/mm3 159 149 142 143 138* 126*  --  139*   MCV fL 91.5 89.5 93.4 93.8 91.6 92.6  --  90.7   MCH pg 27.7 28.4 28.9 27.6 28.1 28.7  --  27.9   MCHC g/dL 30.3* 31.7 31.0* 29.4* 30.7* 31.0*  --  30.8*   RDW % 15.2 14.8 14.7 14.5 14.2 14.5  --  14.0   RDW-SD fl 49.7 47.5 49.5 " 49.5 47.9 48.3  --  46.2   MPV fL 10.7 11.3 10.5 10.7 9.8 10.3  --  9.0   NEUTROPHIL % % 59.1 61.1 66.8 62.3 57.6 46.5   < >  --    LYMPHOCYTE % % 21.0 22.2 19.5* 26.0 30.9 38.3   < >  --    MONOCYTES % % 14.6* 12.3* 10.2 7.9 8.9 11.6   < >  --    EOSINOPHIL % % 3.3 2.1 1.9 2.6 2.1 2.9   < >  --    BASOPHIL % % 0.3 0.5 0.4 0.2 0.2 0.2   < >  --    IMM GRAN % % 1.7* 1.8* 1.2* 1.0* 0.3 0.5   < >  --    NEUTROS ABS 10*3/mm3 3.44 4.07 4.82 3.56 3.35 1.93   < >  --    LYMPHS ABS 10*3/mm3 1.22 1.48 1.41 1.49 1.80 1.59   < >  --    MONOS ABS 10*3/mm3 0.85 0.82 0.74 0.45 0.52 0.48   < >  --    EOS ABS 10*3/mm3 0.19 0.14 0.14 0.15 0.12 0.12   < >  --    BASOS ABS 10*3/mm3 0.02 0.03 0.03 0.01 0.01 0.01   < >  --    IMMATURE GRANS (ABS) 10*3/mm3 0.10* 0.12* 0.09* 0.06* 0.02 0.02   < >  --    NRBC /100 WBC 0.0 0.0 0.0  --  0.0 0.0   < >  --     < > = values in this interval not displayed.                     Results from last 7 days   Lab Units 11/05/24  0556   AMMONIA umol/L 38                         Imaging:  Imaging Results (Last 24 Hours)       ** No results found for the last 24 hours. **               I reviewed the patient's new clinical results / labs / tests / procedures      Assessment/Plan     Active Hospital Problems    Diagnosis  POA    **Discitis of lumbar region [M46.46]  Yes    Severe malnutrition [E43]  Yes    Discitis [M46.40]  Yes    Metabolic encephalopathy [G93.41]  Yes    Aspiration pneumonia [J69.0]  Yes    ESRD on dialysis [N18.6, Z99.2]  Not Applicable    Bipolar disorder [F31.9]  Yes    Chronic respiratory failure with hypoxia [J96.11]  Yes    Essential hypertension [I10]  Yes    Anemia due to chronic kidney disease [N18.9, D63.1]  Yes    Type 2 diabetes mellitus without complication [E11.9]  Yes      Resolved Hospital Problems   No resolved problems to display.           Coag negative staph Bacteremia / L1/2 discitis , T5/6 discitis.   Blood cultures 10/31/2024 positive for coag negative staph.   "Repeat blood cultures are negative.  IV Rocephin DC'd and currently on IV vancomycin per infectious disease. MRI of the cervical spine/thoracic/lumbar-limited studies but thought to have L1/2 discitis along with T5/6 discitis.TDC catheter removed 11/3/2024 as likely source of infection.  Neurosurgery evaluated, no intervention planned, plan for repeat MRI of lumbar and thoracic spine with and without contrast in 3 months. Echocardiogram 11/4/24-normal left ventricular function, EF 56 to 60%, no findings of vegetation/infection ID recommended \"vancomycin dosing per pharmacy with dialysis only x 6 weeks.  Antibiotic stop date December 14/2024 with monitor weekly CBC with differential and vancomycin troughs and fax the results to the infectious disease clinic at [phone].  Arrange for ID follow-up on December 13\"  Hypotension in a patient with a history of hypertension..  Blood pressure is on the high side.  No evidence of angina or congestive heart failure..  Improved blood pressure reading things with decreasing midodrine.  Coreg has been DC'd.  Metabolic encephalopathy/history of bipolar disorder.  Negative CNS examination for focal deficits.CT head 10/8/2024 with no acute findings.  Psychiatry following.  On as needed Zyprexa.  Risperidone was changed to Abilify.  No previous history of dementia.restraints removed 11/6/2024  Patient more awake and alert after stopping Neurontin, decreasing Percocet to 2.5 p.o. every 6 hours and stopping amitriptyline.  hypoxemia/COPD. CT scan 10/41/24 mild patchy and consolidative airspace opacities in the right lower lobe with small adjacent pleural effusion new from previous comparison. Differential includes atelectasis versus early developing infection.  Continue arformoterol, budesonide nebulize.  Stable respiratory status     ESRD on HD has been on home hemodialysis receiving 5 times a week . Nephrology managing dialysis.  Appears euvolemic.TDC catheter removed 11/3/2024 " per vascular as likely source of infection.  Nephrology planning dialysis today.  Vascular surgery evaluated viability of his left AV fistula,Fistula based upon duplex criteria appears mature and able to be utilized.    Gastritis/ Abdominal pain.  Resolved.  Benign GI examination.EGD 10/28/2024 showed gastritis.  Continue IV PPI twice daily  Hypothyroidism.  Continue levothyroxine 175 mcg daily.  TSH mildly elevated.  Diabetes mellitus.  Continue SSI.  Hypoglycemic protocol.  A1c 5.9.  Acceptable Accu-Cheks.  Anemia of chronic disease positive hemoglobin today.  No active bleed.  Might benefit from transfusion during dialysis we will leave that to nephrology.    tube feeds.  Secondary to poor appetite and poor nutrition.        Discussed my findings and plan of treatment with the patient/daughter.  Disposition.  To SNF eventually.    Kaci Camilo MD  [facility]  11/10/24  15:45 EST

## 2024-11-10 NOTE — PROGRESS NOTES
Patient seen and evaluated, chart reviewed and discussed with staff.   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 He is dozing off/ on. He was pleasant on approach, cheerful and responsive, although dozed of quickly. Daughter reports that he was just given oxycodone for pain, however he has been more alert and doing very well. She reported that he has been able to participate more coherently in conversations.   He has not had any behavioral issues, thus requiring no Zyprexa prn.   No changes to be made at this time.

## 2024-11-10 NOTE — PROGRESS NOTES
"[facility] Clinical Pharmacy Services: Vancomycin Monitoring Note    Nelson Wang is a 79 y.o. male who is on day 10 of pharmacy to dose vancomycin for for L1/2 and T5/6 discitis.  Stop date: 12/13/24     Previous Vancomycin Dose: 750 mg IV once on 11/6 at 2113, intermittent dosing    Updated Cultures and Sensitivities:   10/31: BCx-CoN Staph (2 of 2 bottles)  10/31: MRSA PCR-negative  11/2: BCx-negative    Results from last 7 days   Lab Units 11/09/24  0356 11/08/24  0534 11/07/24  0445   VANCOMYCIN RM mcg/mL 23.00 17.30 22.10     Vitals/Labs  Ht: 167 cm (65.75\"); Wt: 74.4 kg (164 lb 0.4 oz)   Temp Readings from Last 1 Encounters:   11/09/24 98.3 °F (36.8 °C)     Estimated Creatinine Clearance: 9.6 mL/min (A) (by C-G formula based on SCr of 6.65 mg/dL (H)).   Results from last 7 days   Lab Units 11/10/24  0623 11/10/24  0622 11/09/24  0356 11/08/24  0534   CREATININE mg/dL  --  6.65* 5.87* 5.42*   WBC 10*3/mm3 5.82  --  6.66 7.23     Assessment/Plan    HD planned today and tomorrow. Pre-HD level this AM estimated to be 20-21 mg/L based on level of 23 mg/L yesterday AM & estimated 10% daily non-renal clearance.    Continue vancomycin intermittent dosing with HD. Ordered 500 mg IV x1 dose to be administered after HD is completed today.   Next Level Date and Time: Vanc random level (pre-HD level) ordered for tomorrow 11/11 with AM labs.   We will continue to monitor patient changes and dialysis schedule.     Thank you for involving pharmacy in this patient's care. Please contact pharmacy with any questions or concerns.       Rigo Temple, PharmD  11/10/24 12:31 EST      Patient did not end up having dialysis today.  I have discontinued the vancomycin dose ordered for today.  Clinical pharmacist will follow up in AM.    Ashlyn JonesD      "

## 2024-11-10 NOTE — PLAN OF CARE
Problem: Comorbidity Management  Goal: Blood Glucose Level Within Target Range  Outcome: Progressing     Problem: Skin Injury Risk Increased  Goal: Skin Health and Integrity  Outcome: Progressing  Intervention: Optimize Skin Protection  Recent Flowsheet Documentation  Taken 11/10/2024 1400 by Rustam Rush RN  Activity Management:   up in chair   ambulated in room  Pressure Reduction Devices: pressure-redistributing mattress utilized     Problem: Fall Injury Risk  Goal: Absence of Fall and Fall-Related Injury  Outcome: Progressing  Intervention: Promote Injury-Free Environment  Recent Flowsheet Documentation  Taken 11/10/2024 1600 by Rustam Rush RN  Safety Promotion/Fall Prevention:   activity supervised   assistive device/personal items within reach   clutter free environment maintained   fall prevention program maintained   nonskid shoes/slippers when out of bed   gait belt   room organization consistent   safety round/check completed  Taken 11/10/2024 1400 by Rustam Rush RN  Safety Promotion/Fall Prevention:   activity supervised   assistive device/personal items within reach   clutter free environment maintained   fall prevention program maintained   nonskid shoes/slippers when out of bed   gait belt   room organization consistent   safety round/check completed  Taken 11/10/2024 1000 by Rustam Rush RN  Safety Promotion/Fall Prevention:   activity supervised   assistive device/personal items within reach   clutter free environment maintained   fall prevention program maintained   nonskid shoes/slippers when out of bed   gait belt   room organization consistent   safety round/check completed  Taken 11/10/2024 0825 by Rustam Rush RN  Safety Promotion/Fall Prevention:   activity supervised   assistive device/personal items within reach   clutter free environment maintained   fall prevention program maintained   nonskid shoes/slippers when out of bed   gait belt   room organization  consistent   safety round/check completed     Problem: Violence Risk or Actual  Goal: Anger and Impulse Control  Outcome: Progressing  Intervention: Minimize Safety Risk  Recent Flowsheet Documentation  Taken 11/10/2024 1600 by Rustam Rush, RN  Enhanced Safety Measures: bed alarm set  Taken 11/10/2024 1400 by Rustam Rush, RN  Enhanced Safety Measures: bed alarm set  Taken 11/10/2024 1000 by Rustam Rush, RN  Enhanced Safety Measures: bed alarm set  Taken 11/10/2024 0825 by Rustam Rush RN  Enhanced Safety Measures: bed alarm set   Goal Outcome Evaluation:            Continuing IV Vanc. VSS. Room Air. Ambulated to the bathroom multiple times. Daughter at bedside all day. HD + 1 Unit RBC tomorrow. Pleasantly confused but cooperative all day.

## 2024-11-11 ENCOUNTER — TELEPHONE (OUTPATIENT)
Dept: INTERNAL MEDICINE | Facility: CLINIC | Age: 79
End: 2024-11-11
Payer: MEDICARE

## 2024-11-11 LAB
ALBUMIN SERPL-MCNC: 2.8 G/DL (ref 3.5–5.2)
ANION GAP SERPL CALCULATED.3IONS-SCNC: 19 MMOL/L (ref 5–15)
BASOPHILS # BLD AUTO: 0.03 10*3/MM3 (ref 0–0.2)
BASOPHILS NFR BLD AUTO: 0.6 % (ref 0–1.5)
BUN SERPL-MCNC: 82 MG/DL (ref 8–23)
BUN/CREAT SERPL: 11.5 (ref 7–25)
CALCIUM SPEC-SCNC: 7.9 MG/DL (ref 8.6–10.5)
CHLORIDE SERPL-SCNC: 91 MMOL/L (ref 98–107)
CO2 SERPL-SCNC: 23 MMOL/L (ref 22–29)
CREAT SERPL-MCNC: 7.12 MG/DL (ref 0.76–1.27)
DEPRECATED RDW RBC AUTO: 51.4 FL (ref 37–54)
EGFRCR SERPLBLD CKD-EPI 2021: 7.3 ML/MIN/1.73
EOSINOPHIL # BLD AUTO: 0.18 10*3/MM3 (ref 0–0.4)
EOSINOPHIL NFR BLD AUTO: 3.6 % (ref 0.3–6.2)
ERYTHROCYTE [DISTWIDTH] IN BLOOD BY AUTOMATED COUNT: 15.6 % (ref 12.3–15.4)
GLUCOSE BLDC GLUCOMTR-MCNC: 106 MG/DL (ref 70–130)
GLUCOSE BLDC GLUCOMTR-MCNC: 134 MG/DL (ref 70–130)
GLUCOSE BLDC GLUCOMTR-MCNC: 177 MG/DL (ref 70–130)
GLUCOSE BLDC GLUCOMTR-MCNC: 177 MG/DL (ref 70–130)
GLUCOSE BLDC GLUCOMTR-MCNC: 186 MG/DL (ref 70–130)
GLUCOSE SERPL-MCNC: 165 MG/DL (ref 65–99)
HCT VFR BLD AUTO: 23.9 % (ref 37.5–51)
HGB BLD-MCNC: 7.4 G/DL (ref 13–17.7)
IMM GRANULOCYTES # BLD AUTO: 0.04 10*3/MM3 (ref 0–0.05)
IMM GRANULOCYTES NFR BLD AUTO: 0.8 % (ref 0–0.5)
LYMPHOCYTES # BLD AUTO: 1.17 10*3/MM3 (ref 0.7–3.1)
LYMPHOCYTES NFR BLD AUTO: 23.5 % (ref 19.6–45.3)
MCH RBC QN AUTO: 28.1 PG (ref 26.6–33)
MCHC RBC AUTO-ENTMCNC: 31 G/DL (ref 31.5–35.7)
MCV RBC AUTO: 90.9 FL (ref 79–97)
MONOCYTES # BLD AUTO: 0.6 10*3/MM3 (ref 0.1–0.9)
MONOCYTES NFR BLD AUTO: 12.1 % (ref 5–12)
NEUTROPHILS NFR BLD AUTO: 2.95 10*3/MM3 (ref 1.7–7)
NEUTROPHILS NFR BLD AUTO: 59.4 % (ref 42.7–76)
NRBC BLD AUTO-RTO: 0 /100 WBC (ref 0–0.2)
PHOSPHATE SERPL-MCNC: 6.2 MG/DL (ref 2.5–4.5)
PLATELET # BLD AUTO: 165 10*3/MM3 (ref 140–450)
PMV BLD AUTO: 10.7 FL (ref 6–12)
POTASSIUM SERPL-SCNC: 3.6 MMOL/L (ref 3.5–5.2)
RBC # BLD AUTO: 2.63 10*6/MM3 (ref 4.14–5.8)
SODIUM SERPL-SCNC: 133 MMOL/L (ref 136–145)
VANCOMYCIN SERPL-MCNC: 19 MCG/ML (ref 5–40)
WBC NRBC COR # BLD AUTO: 4.97 10*3/MM3 (ref 3.4–10.8)

## 2024-11-11 PROCEDURE — 63710000001 INSULIN REGULAR HUMAN PER 5 UNITS: Performed by: NURSE PRACTITIONER

## 2024-11-11 PROCEDURE — 94761 N-INVAS EAR/PLS OXIMETRY MLT: CPT

## 2024-11-11 PROCEDURE — 25010000002 OLANZAPINE 10 MG RECONSTITUTED SOLUTION: Performed by: SPECIALIST

## 2024-11-11 PROCEDURE — 80202 ASSAY OF VANCOMYCIN: CPT | Performed by: STUDENT IN AN ORGANIZED HEALTH CARE EDUCATION/TRAINING PROGRAM

## 2024-11-11 PROCEDURE — 25010000002 VANCOMYCIN PER 500 MG: Performed by: NURSE PRACTITIONER

## 2024-11-11 PROCEDURE — 86900 BLOOD TYPING SEROLOGIC ABO: CPT

## 2024-11-11 PROCEDURE — 94664 DEMO&/EVAL PT USE INHALER: CPT

## 2024-11-11 PROCEDURE — 80069 RENAL FUNCTION PANEL: CPT | Performed by: INTERNAL MEDICINE

## 2024-11-11 PROCEDURE — 94760 N-INVAS EAR/PLS OXIMETRY 1: CPT

## 2024-11-11 PROCEDURE — 94799 UNLISTED PULMONARY SVC/PX: CPT

## 2024-11-11 PROCEDURE — 25010000002 HEPARIN (PORCINE) PER 1000 UNITS: Performed by: STUDENT IN AN ORGANIZED HEALTH CARE EDUCATION/TRAINING PROGRAM

## 2024-11-11 PROCEDURE — 85025 COMPLETE CBC W/AUTO DIFF WBC: CPT | Performed by: INTERNAL MEDICINE

## 2024-11-11 PROCEDURE — P9016 RBC LEUKOCYTES REDUCED: HCPCS

## 2024-11-11 PROCEDURE — 82948 REAGENT STRIP/BLOOD GLUCOSE: CPT

## 2024-11-11 RX ORDER — LORAZEPAM 1 MG/1
1 TABLET ORAL EVERY 12 HOURS SCHEDULED
Status: DISCONTINUED | OUTPATIENT
Start: 2024-11-11 | End: 2024-11-11

## 2024-11-11 RX ORDER — LORAZEPAM 1 MG/1
1 TABLET ORAL DAILY PRN
Status: DISCONTINUED | OUTPATIENT
Start: 2024-11-11 | End: 2024-11-14

## 2024-11-11 RX ORDER — OXYCODONE HCL 5 MG/5 ML
7.5 SOLUTION, ORAL ORAL EVERY 4 HOURS PRN
Status: DISCONTINUED | OUTPATIENT
Start: 2024-11-11 | End: 2024-11-16

## 2024-11-11 RX ORDER — BUSPIRONE HYDROCHLORIDE 5 MG/1
5 TABLET ORAL EVERY 12 HOURS SCHEDULED
Status: DISCONTINUED | OUTPATIENT
Start: 2024-11-11 | End: 2024-11-11

## 2024-11-11 RX ADMIN — HEPARIN SODIUM 5000 UNITS: 5000 INJECTION INTRAVENOUS; SUBCUTANEOUS at 13:29

## 2024-11-11 RX ADMIN — Medication 5 MG: at 20:27

## 2024-11-11 RX ADMIN — HEPARIN SODIUM 5000 UNITS: 5000 INJECTION INTRAVENOUS; SUBCUTANEOUS at 20:28

## 2024-11-11 RX ADMIN — ACETAMINOPHEN 1000 MG: 500 TABLET ORAL at 13:29

## 2024-11-11 RX ADMIN — LIDOCAINE 1 PATCH: 4 PATCH TOPICAL at 16:46

## 2024-11-11 RX ADMIN — ARIPIPRAZOLE 2 MG: 2 TABLET ORAL at 14:03

## 2024-11-11 RX ADMIN — Medication 250 MG: at 13:29

## 2024-11-11 RX ADMIN — ATORVASTATIN CALCIUM 40 MG: 20 TABLET, FILM COATED ORAL at 16:47

## 2024-11-11 RX ADMIN — BUDESONIDE 0.5 MG: 0.5 INHALANT ORAL at 21:14

## 2024-11-11 RX ADMIN — PANTOPRAZOLE SODIUM 40 MG: 40 INJECTION, POWDER, FOR SOLUTION INTRAVENOUS at 16:39

## 2024-11-11 RX ADMIN — TORSEMIDE 100 MG: 100 TABLET ORAL at 14:03

## 2024-11-11 RX ADMIN — OXYCODONE HYDROCHLORIDE 5 MG: 5 SOLUTION ORAL at 07:38

## 2024-11-11 RX ADMIN — FAMOTIDINE 20 MG: 20 TABLET, FILM COATED ORAL at 13:29

## 2024-11-11 RX ADMIN — ARIPIPRAZOLE 2 MG: 2 TABLET ORAL at 20:27

## 2024-11-11 RX ADMIN — ARFORMOTEROL TARTRATE 15 MCG: 15 SOLUTION RESPIRATORY (INHALATION) at 21:14

## 2024-11-11 RX ADMIN — SERTRALINE 100 MG: 100 TABLET, FILM COATED ORAL at 20:27

## 2024-11-11 RX ADMIN — Medication 10 ML: at 20:28

## 2024-11-11 RX ADMIN — Medication 250 MG: at 20:27

## 2024-11-11 RX ADMIN — PANTOPRAZOLE SODIUM 40 MG: 40 INJECTION, POWDER, FOR SOLUTION INTRAVENOUS at 06:44

## 2024-11-11 RX ADMIN — INSULIN HUMAN 2 UNITS: 100 INJECTION, SOLUTION PARENTERAL at 06:44

## 2024-11-11 RX ADMIN — LEVOTHYROXINE SODIUM 175 MCG: 175 TABLET ORAL at 06:44

## 2024-11-11 RX ADMIN — OXYCODONE HYDROCHLORIDE 7.5 MG: 5 SOLUTION ORAL at 20:28

## 2024-11-11 RX ADMIN — MIDODRINE HYDROCHLORIDE 5 MG: 5 TABLET ORAL at 06:44

## 2024-11-11 RX ADMIN — OXYCODONE HYDROCHLORIDE 7.5 MG: 5 SOLUTION ORAL at 10:03

## 2024-11-11 RX ADMIN — VANCOMYCIN HYDROCHLORIDE 500 MG: 500 INJECTION, POWDER, LYOPHILIZED, FOR SOLUTION INTRAVENOUS at 16:39

## 2024-11-11 RX ADMIN — OLANZAPINE 5 MG: 10 INJECTION, POWDER, LYOPHILIZED, FOR SOLUTION INTRAMUSCULAR at 13:26

## 2024-11-11 RX ADMIN — INSULIN HUMAN 2 UNITS: 100 INJECTION, SOLUTION PARENTERAL at 01:39

## 2024-11-11 RX ADMIN — SIMETHICONE 80 MG: 80 TABLET, CHEWABLE ORAL at 13:29

## 2024-11-11 RX ADMIN — HEPARIN SODIUM 5000 UNITS: 5000 INJECTION INTRAVENOUS; SUBCUTANEOUS at 06:44

## 2024-11-11 RX ADMIN — BUSPIRONE HYDROCHLORIDE 5 MG: 5 TABLET ORAL at 10:03

## 2024-11-11 RX ADMIN — BUDESONIDE 0.5 MG: 0.5 INHALANT ORAL at 08:11

## 2024-11-11 NOTE — PROGRESS NOTES
Name: Nelson Wang ADMIT: 2024   : 1945  PCP: Domingo Bravo MD    MRN: [id number] LOS: 10 days   AGE/SEX: 79 y.o. male  ROOM: [facility]     Subjective   Subjective   History from the nurse as the patient is nonverbal at this time.  An episode of confusion and agitation during dialysis that required termination of the dialysis.  Positive disorientation.  Required Zyprexa.  Moving all extremities.  This also has happened after BuSpar was given for anxiety.  No seizures.  No fever or chills.  Positive occasional cough.  No nausea or vomiting.         Objective   Objective   Vital Signs  Temp:  [97.7 °F (36.5 °C)-98.5 °F (36.9 °C)] 98 °F (36.7 °C)  Heart Rate:  [] 115  Resp:  [16-18] 18  BP: (130-159)/(56-95) 130/80  SpO2:  [96 %-99 %] 96 %  on   ;   Device (Oxygen Therapy): room air    Intake/Output Summary (Last 24 hours) at 2024 1534  Last data filed at 2024 1200  Gross per 24 hour   Intake 814.58 ml   Output 400 ml   Net 414.58 ml     Body mass index is 26.1 kg/m².      24  0640 11/10/24  0500 24  1200   Weight: 73 kg (160 lb 14.4 oz) 74.4 kg (164 lb 0.4 oz) 72.8 kg (160 lb 7.9 oz)     Physical Exam  General.  Elderly gentleman.  He is awake however nonverbal and appears to be confused with a gaze stare.  No apparent pain/distress/diaphoresis.  Normal mood and affect.  Eyes.  Pupils equal round and reactive.  Cannot  on extraocular musculature.  No pallor or jaundice  Oral cavity.  Moist mucous membrane.  Nose.  Core track in place.  Neck.  Supple.  No JVD.  No lymphadenopathy or thyromegaly.  Cardiovascular.  Regular rate and rhythm with occasional ectopic beats and no murmurs.  Chest.  Poor bilateral air entry with scattered bilateral rhonchi.  Abdomen.  Soft lax.  No tenderness.  No organomegaly.  No guarding or rebound  Extremities.  No clubbing/cyanosis/edema  CNS.  No acute focal neurological deficits.    Results Review:      Results from last 7 days   Lab  "Units 11/11/24  0539 11/10/24  0622 11/09/24  0356 11/08/24  0534 11/07/24  0631 11/06/24  0527 11/05/24  0556   SODIUM mmol/L 133* 132* 135* 132* 132* 135* 134*   POTASSIUM mmol/L 3.6 3.3* 3.9 3.7 3.2* 3.5 3.4*   CHLORIDE mmol/L 91* 94* 98 98 99 102 102   CO2 mmol/L 23.0 22.7 23.0 22.0 25.3 21.3* 21.4*   BUN mg/dL 82* 74* 52* 47* 24* 38* 23   CREATININE mg/dL 7.12* 6.65* 5.87* 5.42* 3.99* 7.39* 5.14*   GLUCOSE mg/dL 165* 194* 137* 166* 180* 156* 161*   CALCIUM mg/dL 7.9* 8.3* 8.2* 8.4* 8.5* 9.0 8.9   AST (SGOT) U/L  --  18  --   --   --  19  --    ALT (SGPT) U/L  --  6  --   --   --  <5  --      Estimated Creatinine Clearance: 8.8 mL/min (A) (by C-G formula based on SCr of 7.12 mg/dL (H)).  Results from last 7 days   Lab Units 11/09/24 0356   HEMOGLOBIN A1C % 5.90*     Results from last 7 days   Lab Units 11/11/24  1513 11/11/24  1150 11/11/24  0613 11/11/24  0000 11/10/24  1853 11/10/24  1128 11/10/24  0546 11/10/24  0041   GLUCOSE mg/dL 177* 134* 186* 177* 127 169* 222* 192*             Results from last 7 days   Lab Units 11/09/24  0356   TSH uIU/mL 6.430*     Results from last 7 days   Lab Units 11/11/24  0539 11/07/24  0631 11/06/24  0527   MAGNESIUM mg/dL  --   --  2.3   PHOSPHORUS mg/dL 6.2*   < > 2.1*    < > = values in this interval not displayed.           Invalid input(s): \"LDLCALC\"  Results from last 7 days   Lab Units 11/11/24  0539 11/10/24  0623 11/09/24  0356 11/08/24  0534 11/07/24  0445 11/06/24  0527 11/05/24  0556   WBC 10*3/mm3 4.97 5.82 6.66 7.23 5.72 5.82 4.15   HEMOGLOBIN g/dL 7.4* 7.2* 8.4* 8.3* 8.0* 8.4* 8.5*   HEMATOCRIT % 23.9* 23.8* 26.5* 26.8* 27.2* 27.4* 27.4*   PLATELETS 10*3/mm3 165 159 149 142 143 138* 126*   MCV fL 90.9 91.5 89.5 93.4 93.8 91.6 92.6   MCH pg 28.1 27.7 28.4 28.9 27.6 28.1 28.7   MCHC g/dL 31.0* 30.3* 31.7 31.0* 29.4* 30.7* 31.0*   RDW % 15.6* 15.2 14.8 14.7 14.5 14.2 14.5   RDW-SD fl 51.4 49.7 47.5 49.5 49.5 47.9 48.3   MPV fL 10.7 10.7 11.3 10.5 10.7 9.8 10.3 "   NEUTROPHIL % % 59.4 59.1 61.1 66.8 62.3 57.6 46.5   LYMPHOCYTE % % 23.5 21.0 22.2 19.5* 26.0 30.9 38.3   MONOCYTES % % 12.1* 14.6* 12.3* 10.2 7.9 8.9 11.6   EOSINOPHIL % % 3.6 3.3 2.1 1.9 2.6 2.1 2.9   BASOPHIL % % 0.6 0.3 0.5 0.4 0.2 0.2 0.2   IMM GRAN % % 0.8* 1.7* 1.8* 1.2* 1.0* 0.3 0.5   NEUTROS ABS 10*3/mm3 2.95 3.44 4.07 4.82 3.56 3.35 1.93   LYMPHS ABS 10*3/mm3 1.17 1.22 1.48 1.41 1.49 1.80 1.59   MONOS ABS 10*3/mm3 0.60 0.85 0.82 0.74 0.45 0.52 0.48   EOS ABS 10*3/mm3 0.18 0.19 0.14 0.14 0.15 0.12 0.12   BASOS ABS 10*3/mm3 0.03 0.02 0.03 0.03 0.01 0.01 0.01   IMMATURE GRANS (ABS) 10*3/mm3 0.04 0.10* 0.12* 0.09* 0.06* 0.02 0.02   NRBC /100 WBC 0.0 0.0 0.0 0.0  --  0.0 0.0                     Results from last 7 days   Lab Units 11/05/24  0556   AMMONIA umol/L 38                         Imaging:  Imaging Results (Last 24 Hours)       ** No results found for the last 24 hours. **               I reviewed the patient's new clinical results / labs / tests / procedures      Assessment/Plan     Active Hospital Problems    Diagnosis  POA    **Discitis of lumbar region [M46.46]  Yes    Severe malnutrition [E43]  Yes    Discitis [M46.40]  Yes    Metabolic encephalopathy [G93.41]  Yes    Aspiration pneumonia [J69.0]  Yes    ESRD on dialysis [N18.6, Z99.2]  Not Applicable    Bipolar disorder [F31.9]  Yes    Chronic respiratory failure with hypoxia [J96.11]  Yes    Essential hypertension [I10]  Yes    Anemia due to chronic kidney disease [N18.9, D63.1]  Yes    Type 2 diabetes mellitus without complication [E11.9]  Yes      Resolved Hospital Problems   No resolved problems to display.           Coag negative staph Bacteremia / L1/2 discitis , T5/6 discitis.   Blood cultures 10/31/2024 positive for coag negative staph.  Repeat blood cultures are negative.  IV Rocephin DC'd and currently on IV vancomycin per infectious disease. MRI of the cervical spine/thoracic/lumbar-limited studies but thought to have L1/2 discitis  "along with T5/6 discitis.TDC catheter removed 11/3/2024 as likely source of infection.  Neurosurgery evaluated, no intervention planned, plan for repeat MRI of lumbar and thoracic spine with and without contrast in 3 months. Echocardiogram 11/4/24-normal left ventricular function, EF 56 to 60%, no findings of vegetation/infection ID recommended \"vancomycin dosing per pharmacy with dialysis only x 6 weeks.  Antibiotic stop date December 14/2024 with monitor weekly CBC with differential and vancomycin troughs and fax the results to the infectious disease clinic at [phone].  Arrange for ID follow-up on December 13\"  Hypotension in a patient with a history of hypertension..  Blood pressure is on the high side.  No evidence of angina or congestive heart failure..  Improved blood pressure reading things with decreasing midodrine.  Coreg has been DC'd.  Metabolic encephalopathy/history of bipolar disorder.  Negative CNS examination for focal deficits.CT head 10/8/2024 with no acute findings.  Psychiatry following.  On as needed Zyprexa.  Risperidone was changed to Abilify.  No previous history of dementia.restraints removed 11/6/2024  This has greatly however there is a relapse today during dialysis which could be related to dialysis versus the BuSpar.  Will check CT scan of the brain and stop BuSpar and monitor.   hypoxemia/COPD. CT scan 10/41/24 mild patchy and consolidative airspace opacities in the right lower lobe with small adjacent pleural effusion new from previous comparison. Differential includes atelectasis versus early developing infection.  Continue arformoterol, budesonide nebulize.  Stable respiratory status     ESRD on HD has been on home hemodialysis receiving 5 times a week . Nephrology managing dialysis.  Appears euvolemic.TDC catheter removed 11/3/2024 per vascular as likely source of infection.  Nephrology planning dialysis today.  Vascular surgery evaluated viability of his left AV " fistula,Fistula based upon duplex criteria appears mature and able to be utilized.    Gastritis/ Abdominal pain.  Resolved.  Benign GI examination.EGD 10/28/2024 showed gastritis.  Continue IV PPI twice daily  Hypothyroidism.  Continue levothyroxine 175 mcg daily.  TSH mildly elevated.  Diabetes mellitus.  Continue SSI.  Hypoglycemic protocol.  A1c 5.9.  Acceptable Accu-Cheks.  Anemia of chronic disease hemoglobin continues to slowly drop.  Transfused during dialysis today..  No active bleed.     tube feeds.  Secondary to poor appetite and poor nutrition.        Discussed my findings and plan of treatment with the patient/daughter.  Disposition.  To SNF eventually.    Kaci Camilo MD  [facility]  11/11/24  15:34 EST

## 2024-11-11 NOTE — TELEPHONE ENCOUNTER
Caller: Jose Wang     Relationship: Daughter     Best call back number: [phone]     Who are you requesting to speak with: Laura     What was the call regarding: Called to see if [facility] Paperwork has been completed and faxed returned.

## 2024-11-11 NOTE — SIGNIFICANT NOTE
11/11/24 1610   OTHER   Discipline physical therapy assistant   Rehab Time/Intention   Session Not Performed other (see comments)  (Per RN, pt is not appropriate for PT this PM. Pt had confusion and agitation during dialysis. CT scan ordered. Will follow up with pt tomorrow if appropriate.)   Recommendation   PT - Next Appointment 11/12/24

## 2024-11-11 NOTE — CASE MANAGEMENT/SOCIAL WORK
Continued Stay Note  [facility]     Patient Name: Nelson Wang  MRN: [id number]  Today's Date: 11/11/2024    Admit Date: 11/1/2024    Plan: Rehab   Discharge Plan       Row Name 11/11/24 1713       Plan    Plan Rehab    Patient/Family in Agreement with Plan yes    Plan Comments Spoke with pt's daughter on phone. She confirmed the plan is for the pt to go to rehab at discharge. [facility] has declined so she asked that a referral be entered for [facility]. Referral entered. She denied any further needs at this time. CCP following. PARAG Masterson RN                   Discharge Codes    No documentation.                 Expected Discharge Date and Time       Expected Discharge Date Expected Discharge Time    Nov 13, 2024               Rudolph Easton RN

## 2024-11-11 NOTE — PROGRESS NOTES
"[facility] Clinical Pharmacy Services: Vancomycin Monitoring Note    Nelson Wang is a 79 y.o. male who is on day 11 of pharmacy to dose vancomycin for L1/2 and T5/6 discitis.  Stop date: 12/13/24     Previous Vancomycin Dose: Intermittent pulse dosing     Updated Cultures and Sensitivities:   10/31: BCx-CoN Staph (2 of 2 bottles)  10/31: MRSA PCR-negative  11/2: BCx-negative    Results from last 7 days   Lab Units 11/11/24  0539 11/09/24  0356 11/08/24  0534   VANCOMYCIN RM mcg/mL 19.00 23.00 17.30     Vitals/Labs  Ht: 167 cm (65.75\"); Wt: 74.4 kg (164 lb 0.4 oz)   Temp Readings from Last 1 Encounters:   11/11/24 97.7 °F (36.5 °C)     Estimated Creatinine Clearance: 9 mL/min (A) (by C-G formula based on SCr of 7.12 mg/dL (H)).   Results from last 7 days   Lab Units 11/11/24 0539 11/10/24  0623 11/10/24  0622 11/09/24  0356   CREATININE mg/dL 7.12*  --  6.65* 5.87*   WBC 10*3/mm3 4.97 5.82  --  6.66     Assessment/Plan  Random vancomycin level returned at 19 mg/L. Dialysis planned for today. Plan as below:     Continue vancomycin intermittent dosing with HD. Plan a supplemental dose of vancomycin 500 mg  x 1 AFTER dialysis.  Next Level Date and Time: TBD pending dialysis schedule  We will continue to monitor patient changes and dialysis schedule.     Thank you for involving pharmacy in this patient's care. Please contact pharmacy with any questions or concerns.       Cheyenne Gowers, [facility]  11/11/24 11:41 EST  "

## 2024-11-11 NOTE — PLAN OF CARE
Goal Outcome Evaluation:  Plan of Care Reviewed With: patient        Progress: no change        Pt disoriented to time and situation. Unable to complete HD d/t restlessness. Zyprexa 5mg IM given x1 for agitation. Pt resting comfortably. Pain tx per MAR. NSR/ST. 2L NC when sleeping. WCTM.

## 2024-11-11 NOTE — PROGRESS NOTES
The patient looks greatly improved today.  He is up in a chair awake alert and communicative for the first time with this physician.  He is reporting extreme anxiety related to dialysis sessions.  I have therefore ordered lorazepam 1 mg to be given 30 minutes prior to dialysis.

## 2024-11-11 NOTE — NURSING NOTE
HD ended after 2 hours due to venous needle infiltration. 1 unit of PRBC's given. Ice applied to infiltration. Post /115 HR 95 T 97.7. Verbal report given.

## 2024-11-11 NOTE — PROGRESS NOTES
[facility] Progress Note      Patient Name: Nelson Wang  : 1945  MRN: [id number]  Primary Care Physician:  Domingo Bravo MD  Date of admission: 2024    Subjective     Interval History:   Follow up end stage renal disease      The patient is feeling somewhat better, more awake and alert, tolerating his tube feed, asking for pain medication for his back pain.    Review of Systems:   As noted above    Objective     Vitals:   Temp:  [97.9 °F (36.6 °C)-98.5 °F (36.9 °C)] 98.5 °F (36.9 °C)  Heart Rate:  [] 91  Resp:  [16-18] 18  BP: (128-158)/(56-76) 150/56    Intake/Output Summary (Last 24 hours) at 2024 0849  Last data filed at 2024 0538  Gross per 24 hour   Intake 635 ml   Output --   Net 635 ml       Physical Exam:    General Appearance: Chronically ill, awake, frail  HEENT: Core track in place  Neck: No JVD  Lungs: Scattered rhonchi, unlabored breathing  Heart: RRR, no rub  Abdomen: soft, nontender, nondistended  Extremities: no edema, LUE AVF with thrill and bruit he has hematoma over the AV fistula.  Neuro: Moving all extremities speech improved    Scheduled Meds:     acetaminophen, 1,000 mg, Nasogastric, Q8H  arformoterol, 15 mcg, Nebulization, BID - RT  ARIPiprazole, 2 mg, Oral, BID  atorvastatin, 40 mg, Oral, Daily  budesonide, 0.5 mg, Nebulization, BID - RT  Check Fentanyl Patch Placement, 1 each, Does not apply, Q12H  heparin (porcine), 5,000 Units, Subcutaneous, Q8H  insulin regular, 2-7 Units, Subcutaneous, Q6H  levothyroxine, 175 mcg, Nasogastric, Q AM  Lidocaine, 1 patch, Transdermal, Q24H  Lidocaine, 1 patch, Transdermal, Q24H  midodrine, 5 mg, Oral, TID AC  pantoprazole, 40 mg, Intravenous, BID AC  saccharomyces boulardii, 250 mg, Oral, BID  senna-docusate sodium, 2 tablet, Nasogastric, Daily  sertraline, 100 mg, Oral, Nightly  sodium chloride, 10 mL, Intravenous, Q12H  torsemide, 100 mg, Oral, Daily  Vancomycin Pharmacy  Intermittent/Pulse Dosing, , Does not apply, Daily      IV Meds:   Pharmacy to dose vancomycin,         Results Reviewed:   I have personally reviewed the results from the time of this admission to 11/11/2024 08:49 EST     Results from last 7 days   Lab Units 11/11/24  0539 11/10/24  0622 11/09/24  0356 11/07/24  0631 11/06/24  0527   SODIUM mmol/L 133* 132* 135*   < > 135*   POTASSIUM mmol/L 3.6 3.3* 3.9   < > 3.5   CHLORIDE mmol/L 91* 94* 98   < > 102   CO2 mmol/L 23.0 22.7 23.0   < > 21.3*   BUN mg/dL 82* 74* 52*   < > 38*   CREATININE mg/dL 7.12* 6.65* 5.87*   < > 7.39*   CALCIUM mg/dL 7.9* 8.3* 8.2*   < > 9.0   BILIRUBIN mg/dL  --  0.4  --   --  0.4   ALK PHOS U/L  --  70  --   --  70   ALT (SGPT) U/L  --  6  --   --  <5   AST (SGOT) U/L  --  18  --   --  19   GLUCOSE mg/dL 165* 194* 137*   < > 156*    < > = values in this interval not displayed.     Estimated Creatinine Clearance: 9 mL/min (A) (by C-G formula based on SCr of 7.12 mg/dL (H)).  Results from last 7 days   Lab Units 11/11/24  0539 11/10/24  0622 11/09/24  0356 11/07/24  0631 11/06/24  0527   MAGNESIUM mg/dL  --   --   --   --  2.3   PHOSPHORUS mg/dL 6.2* 4.2 1.8*   < > 2.1*    < > = values in this interval not displayed.         Results from last 7 days   Lab Units 11/11/24  0539 11/10/24  0623 11/09/24  0356 11/08/24  0534 11/07/24  0445   WBC 10*3/mm3 4.97 5.82 6.66 7.23 5.72   HEMOGLOBIN g/dL 7.4* 7.2* 8.4* 8.3* 8.0*   PLATELETS 10*3/mm3 165 159 149 142 143           Assessment / Plan     ASSESSMENT:  End-stage renal disease - On home hemodialysis as an outpatient, was receiving 5 times a week (Dr Gloria suarez), via [facility] TDC due to LUE AVF dysfunction (placed in July).  Catheter has now been removed.  Plan for dialysis today, sodium is 133 potassium is 3.6    Anemia of chronic kidney disease on long-acting RONALD as an outpatient. Hgb dropped to 7.4 g/dl today, platelets were 165,000.  Hypertension with chronic kidney disease- Last blood pressure  recording was elevated.  Hypothyroidism on replacement therapy  Acute encephalopathy   T5/6 discitis/osteomyelitis -followed by MRI with contrast 11/2 shows L1/2 & T5/6 discitis  Coag negative staph bacteremia currently on vancomycin  Hypophosphatemia, improved with replacement, phosphorus up to 6.2 today currently on tube feed difficult to give binders    PLAN:  Hemodialysis today and will transfuse 1 unit of packed RBCs with dialysis  Change to 3K bath  Surveillance labs    I reviewed the chart and other providers notes, reviewed labs.  I discussed the case with the patient and his daughter at the bedside I also discussed the case with the dialysis nurse was ready to place him on dialysis.  Copied text in this note has been reviewed and is accurate as of 11/11/24.           Bart Wlof MD  11/11/24  08:49 [facility]    [facility]  [phone]

## 2024-11-11 NOTE — PROGRESS NOTES
Nutrition Services    Patient Name:  Nelson Wang  YOB: 1945  MRN: [id number]  Admit Date:  11/1/2024  Assessment Date:  11/11/24    CLINICAL NUTRITION    Patient visited with daughter at bedside. Receiving dialysis at bedside.  Diet advanced to Consistent Carb, Soft with chopped meats, thin liquids.  TF modifieed to Novasource Renal from 8p-8a with 10 ml water/hr to provide 960 kcal, 46 gm protein, and total of 461 ml free water meeting approx 57% of kcal and pro needs.         Plan/Recommendations:   Family agreed that patient is not eating enough to do without the feeds at night.  Agreed that goal of 50% of meals would be goal to be able to d/c tube feeds.  Feels patient would eat much better with hot sauce on food.    RD monitor po intake and tube feeding per protocol.     Encounter Information         Reason For Encounter Follow up for TF    Current Issues ESRD, anemia, HTN, acute encephalopathy, T5/6 discitis/osteomyelitis, dyshphagia     Estimated Requirements         Calories  1675 (25 kcal/kg)    Protein (gm)  74-89 (1.0 - 1.2 gm/kg)    Fluid (mL)   (1 mL/kcal)     Current Nutrition Orders & Evaluatio.n of Intake       Oral Nutrition     Current PO Diet Diet: Regular/House, Diabetic; Consistent Carbohydrate; No Mixed Consistencies, Feeding Assistance - Nursing; Texture: Soft to Chew (NDD 3); Soft to Chew: Chopped Meat; Fluid Consistency: Thin (IDDSI 0)   Supplement n/a   PO Evaluation     Trending % PO Intake <25%    Factors Affecting Intake  altered mental status, swallow impairment      Enteral Nutrition     Enteral Route NG    TF Delivery Method Continuous    Propofol Rate/Kcal     Current TF/Rate (mL) Novasource Renal @ 40 mL/hr x 12 hours    TF Goal Rate (mL) 40    Current Water Flush (mL) 10 Q hr    Modular None    TF Residual  no or minimal residual    TF Tolerance tolerating    TF Observation Verified correct TF and water flush infusing per orders     Anthropometrics           "Height    Weight Height: 167 cm (65.75\")  Weight: 72.8 kg (160 lb 7.9 oz) (11/11/24 1200)    BMI kg/m2 Body mass index is 26.1 kg/m².  Overweight (25 - 29.9)    Weight trend Loss, Gain     Labs        Pertinent Labs Reviewed, listed below     Results from last 7 days   Lab Units 11/11/24  0539 11/10/24  0622 11/09/24  0356 11/07/24  0631 11/06/24  0527   SODIUM mmol/L 133* 132* 135*   < > 135*   POTASSIUM mmol/L 3.6 3.3* 3.9   < > 3.5   CHLORIDE mmol/L 91* 94* 98   < > 102   CO2 mmol/L 23.0 22.7 23.0   < > 21.3*   BUN mg/dL 82* 74* 52*   < > 38*   CREATININE mg/dL 7.12* 6.65* 5.87*   < > 7.39*   CALCIUM mg/dL 7.9* 8.3* 8.2*   < > 9.0   BILIRUBIN mg/dL  --  0.4  --   --  0.4   ALK PHOS U/L  --  70  --   --  70   ALT (SGPT) U/L  --  6  --   --  <5   AST (SGOT) U/L  --  18  --   --  19   GLUCOSE mg/dL 165* 194* 137*   < > 156*    < > = values in this interval not displayed.     Results from last 7 days   Lab Units 11/11/24 0539 11/07/24 0445 11/06/24  0527   MAGNESIUM mg/dL  --   --  2.3   PHOSPHORUS mg/dL 6.2*   < > 2.1*   HEMOGLOBIN g/dL 7.4*   < > 8.4*   HEMATOCRIT % 23.9*   < > 27.4*   WBC 10*3/mm3 4.97   < > 5.82   ALBUMIN g/dL 2.8*   < > 2.6*    < > = values in this interval not displayed.     Results from last 7 days   Lab Units 11/11/24  0539 11/10/24  0623 11/09/24  0356 11/08/24  0534 11/07/24  0445   PLATELETS 10*3/mm3 165 159 149 142 143     COVID19   Date Value Ref Range Status   10/08/2024 Not Detected Not Detected - Ref. Range Final     Lab Results   Component Value Date    HGBA1C 5.90 (H) 11/09/2024          Medications            Scheduled Medications acetaminophen, 1,000 mg, Nasogastric, Q8H  arformoterol, 15 mcg, Nebulization, BID - RT  ARIPiprazole, 2 mg, Oral, BID  atorvastatin, 40 mg, Oral, Daily  budesonide, 0.5 mg, Nebulization, BID - RT  busPIRone, 5 mg, Oral, Q12H  Check Fentanyl Patch Placement, 1 each, Does not apply, Q12H  heparin (porcine), 5,000 Units, Subcutaneous, Q8H  insulin " regular, 2-7 Units, Subcutaneous, Q6H  levothyroxine, 175 mcg, Nasogastric, Q AM  Lidocaine, 1 patch, Transdermal, Q24H  Lidocaine, 1 patch, Transdermal, Q24H  LORazepam, 1 mg, Oral, Q12H  midodrine, 5 mg, Oral, TID AC  pantoprazole, 40 mg, Intravenous, BID AC  saccharomyces boulardii, 250 mg, Oral, BID  senna-docusate sodium, 2 tablet, Nasogastric, Daily  sertraline, 100 mg, Oral, Nightly  sodium chloride, 10 mL, Intravenous, Q12H  torsemide, 100 mg, Oral, Daily  vancomycin, 500 mg, Intravenous, Once  Vancomycin Pharmacy Intermittent/Pulse Dosing, , Does not apply, Daily        Infusions Pharmacy to dose vancomycin,         PRN Medications   [DISCONTINUED] senna-docusate sodium **AND** polyethylene glycol **AND** bisacodyl **AND** bisacodyl    cloNIDine    dextrose    dextrose    famotidine    glucagon (human recombinant)    heparin (porcine)    ipratropium-albuterol    labetalol    melatonin    nitroglycerin    OLANZapine    ondansetron    oxyCODONE    Pharmacy to dose vancomycin    simethicone    sodium chloride    sodium chloride     Physical Findings          Physical Appearance alert, disoriented, laying in bed receiving dialysis, family present   Oral/Mouth Cavity tooth or teeth missing   Edema  no edema   Gastrointestinal + BM 11/9   Skin  bruising   Tubes/Drains Cortrak, dialysis catheter   NFPE Not indicated at this time     NUTRITION INTERVENTION / PLAN OF CARE  Intervention Goal         Intervention Goal(s) Reduce/improve symptoms, Meet estimated needs, Disease management/therapy, Tolerate PO , Increase intake, Transition TF to PO, and No significant weight loss     Nutrition Intervention         RD Action Encourage intake, Continue to monitor, Care plan reviewed, and Other (comment): continue to assess needs for TF's     Monitor/Evaluation        Monitor Per protocol, I&O, PO intake, Pertinent labs, EN delivery/tolerance, Weight, Skin status, GI status, Symptoms, Swallow function   Discharge Needs  Pending clinical course       Electronically signed by:  Eleuterio Mae RD  11/11/24 13:26 EST

## 2024-11-11 NOTE — DISCHARGE PLACEMENT REQUEST
"Nelson Caceres (79 y.o. Male)       Date of Birth   1945    Social Security Number       Address   [address] [place] [address] [place] [address]    Home Phone   [phone]    MRN   [id number]       Anabaptism   M[employer]on    Marital Status                               Admission Date   11/1/24    Admission Type   Urgent    Admitting Provider   Fela Snyder MD    Attending Provider   Kaci Camilo MD    Department, Room/Bed   [address], E565/1       Discharge Date       Discharge Disposition       Discharge Destination                                 Attending Provider: Kaci Camilo MD    Allergies: No Known Allergies    Isolation: None   Infection: None   Code Status: CPR    Ht: 167 cm (65.75\")   Wt: 72.8 kg (160 lb 7.9 oz)    Admission Cmt: None   Principal Problem: Discitis of lumbar region [M46.46]                   Active Insurance as of 11/1/2024       Primary Coverage       Payor Plan Insurance Group Employer/Plan Group    MEDICARE MEDICARE A & B        Payor Plan Address Payor Plan Phone Number Payor Plan Fax Number Effective Dates    [address] [phone]  1/1/2011 - None Entered    [facility] [id number]         Subscriber Name Subscriber Birth Date Member ID       NELSON CACERES 1945 [id number]               Secondary Coverage       Payor Plan Insurance Group Employer/Plan Group    AARP MC SUP AARP HEALTH CARE OPTIONS        Payor Plan Address Payor Plan Phone Number Payor Plan Fax Number Effective Dates    [facility] [phone]  1/1/2021 - None Entered    [address]       [facility] [address]         Subscriber Name Subscriber Birth Date Member ID       NELSON CACERES 1945 [id number]                     Emergency Contacts        (Rel.) Home Phone Work Phone Mobile Phone    Jose Caceres (Daughter) [phone] -- [phone]    KALEIGH CACERES (Spouse) -- -- [phone]                "

## 2024-11-11 NOTE — PLAN OF CARE
Goal Outcome Evaluation:         VSS, room air, SR on monitor, Tube feeds infusing 8p-8a via cortrak. Blood sugars and pain treated per order. Pt to receive dialysis with PRBC and Abx today. Up to BSC to urinate, gaining strength and turning self in bed. Plan of care ongoing.

## 2024-11-12 ENCOUNTER — DOCUMENTATION (OUTPATIENT)
Dept: INTERNAL MEDICINE | Facility: CLINIC | Age: 79
End: 2024-11-12
Payer: MEDICARE

## 2024-11-12 ENCOUNTER — APPOINTMENT (OUTPATIENT)
Dept: CT IMAGING | Facility: HOSPITAL | Age: 79
DRG: 551 | End: 2024-11-12
Payer: MEDICARE

## 2024-11-12 LAB
ALBUMIN SERPL-MCNC: 2.6 G/DL (ref 3.5–5.2)
ANION GAP SERPL CALCULATED.3IONS-SCNC: 9.9 MMOL/L (ref 5–15)
BASOPHILS # BLD AUTO: 0.03 10*3/MM3 (ref 0–0.2)
BASOPHILS NFR BLD AUTO: 0.7 % (ref 0–1.5)
BH BB BLOOD EXPIRATION DATE: NORMAL
BH BB BLOOD TYPE BARCODE: 8400
BH BB DISPENSE STATUS: NORMAL
BH BB PRODUCT CODE: NORMAL
BH BB UNIT NUMBER: NORMAL
BUN SERPL-MCNC: 45 MG/DL (ref 8–23)
BUN/CREAT SERPL: 9.4 (ref 7–25)
CALCIUM SPEC-SCNC: 8.1 MG/DL (ref 8.6–10.5)
CHLORIDE SERPL-SCNC: 98 MMOL/L (ref 98–107)
CO2 SERPL-SCNC: 23.1 MMOL/L (ref 22–29)
CREAT SERPL-MCNC: 4.78 MG/DL (ref 0.76–1.27)
CROSSMATCH INTERPRETATION: NORMAL
DEPRECATED RDW RBC AUTO: 48.8 FL (ref 37–54)
EGFRCR SERPLBLD CKD-EPI 2021: 11.8 ML/MIN/1.73
EOSINOPHIL # BLD AUTO: 0.15 10*3/MM3 (ref 0–0.4)
EOSINOPHIL NFR BLD AUTO: 3.5 % (ref 0.3–6.2)
ERYTHROCYTE [DISTWIDTH] IN BLOOD BY AUTOMATED COUNT: 15 % (ref 12.3–15.4)
GLUCOSE BLDC GLUCOMTR-MCNC: 124 MG/DL (ref 70–130)
GLUCOSE BLDC GLUCOMTR-MCNC: 125 MG/DL (ref 70–130)
GLUCOSE BLDC GLUCOMTR-MCNC: 161 MG/DL (ref 70–130)
GLUCOSE BLDC GLUCOMTR-MCNC: 196 MG/DL (ref 70–130)
GLUCOSE SERPL-MCNC: 150 MG/DL (ref 65–99)
HCT VFR BLD AUTO: 26.9 % (ref 37.5–51)
HGB BLD-MCNC: 8.5 G/DL (ref 13–17.7)
IMM GRANULOCYTES # BLD AUTO: 0.04 10*3/MM3 (ref 0–0.05)
IMM GRANULOCYTES NFR BLD AUTO: 0.9 % (ref 0–0.5)
LYMPHOCYTES # BLD AUTO: 1.29 10*3/MM3 (ref 0.7–3.1)
LYMPHOCYTES NFR BLD AUTO: 30 % (ref 19.6–45.3)
MCH RBC QN AUTO: 28.7 PG (ref 26.6–33)
MCHC RBC AUTO-ENTMCNC: 31.6 G/DL (ref 31.5–35.7)
MCV RBC AUTO: 90.9 FL (ref 79–97)
MONOCYTES # BLD AUTO: 0.78 10*3/MM3 (ref 0.1–0.9)
MONOCYTES NFR BLD AUTO: 18.1 % (ref 5–12)
NEUTROPHILS NFR BLD AUTO: 2.01 10*3/MM3 (ref 1.7–7)
NEUTROPHILS NFR BLD AUTO: 46.8 % (ref 42.7–76)
NRBC BLD AUTO-RTO: 0 /100 WBC (ref 0–0.2)
PHOSPHATE SERPL-MCNC: 6 MG/DL (ref 2.5–4.5)
PLATELET # BLD AUTO: 164 10*3/MM3 (ref 140–450)
PMV BLD AUTO: 10.1 FL (ref 6–12)
POTASSIUM SERPL-SCNC: 4.3 MMOL/L (ref 3.5–5.2)
RBC # BLD AUTO: 2.96 10*6/MM3 (ref 4.14–5.8)
SODIUM SERPL-SCNC: 131 MMOL/L (ref 136–145)
UNIT  ABO: NORMAL
UNIT  RH: NORMAL
WBC NRBC COR # BLD AUTO: 4.3 10*3/MM3 (ref 3.4–10.8)

## 2024-11-12 PROCEDURE — 63710000001 INSULIN REGULAR HUMAN PER 5 UNITS: Performed by: NURSE PRACTITIONER

## 2024-11-12 PROCEDURE — 94760 N-INVAS EAR/PLS OXIMETRY 1: CPT

## 2024-11-12 PROCEDURE — 94761 N-INVAS EAR/PLS OXIMETRY MLT: CPT

## 2024-11-12 PROCEDURE — 25010000002 HEPARIN (PORCINE) PER 1000 UNITS: Performed by: STUDENT IN AN ORGANIZED HEALTH CARE EDUCATION/TRAINING PROGRAM

## 2024-11-12 PROCEDURE — 94799 UNLISTED PULMONARY SVC/PX: CPT

## 2024-11-12 PROCEDURE — 70450 CT HEAD/BRAIN W/O DYE: CPT

## 2024-11-12 PROCEDURE — 94664 DEMO&/EVAL PT USE INHALER: CPT

## 2024-11-12 PROCEDURE — 99222 1ST HOSP IP/OBS MODERATE 55: CPT | Performed by: PSYCHIATRY & NEUROLOGY

## 2024-11-12 PROCEDURE — 97110 THERAPEUTIC EXERCISES: CPT | Performed by: PHYSICAL THERAPIST

## 2024-11-12 PROCEDURE — 71250 CT THORAX DX C-: CPT

## 2024-11-12 PROCEDURE — 85025 COMPLETE CBC W/AUTO DIFF WBC: CPT | Performed by: INTERNAL MEDICINE

## 2024-11-12 PROCEDURE — 82948 REAGENT STRIP/BLOOD GLUCOSE: CPT

## 2024-11-12 PROCEDURE — 92526 ORAL FUNCTION THERAPY: CPT | Performed by: SPEECH-LANGUAGE PATHOLOGIST

## 2024-11-12 PROCEDURE — 80069 RENAL FUNCTION PANEL: CPT | Performed by: INTERNAL MEDICINE

## 2024-11-12 RX ORDER — MANNITOL 250 MG/ML
12.5 INJECTION, SOLUTION INTRAVENOUS AS NEEDED
Status: CANCELLED | OUTPATIENT
Start: 2024-11-13 | End: 2024-11-13

## 2024-11-12 RX ADMIN — Medication 250 MG: at 08:33

## 2024-11-12 RX ADMIN — Medication 10 ML: at 20:19

## 2024-11-12 RX ADMIN — ARIPIPRAZOLE 2 MG: 2 TABLET ORAL at 08:29

## 2024-11-12 RX ADMIN — ACETAMINOPHEN 1000 MG: 500 TABLET ORAL at 08:28

## 2024-11-12 RX ADMIN — ACETAMINOPHEN 1000 MG: 500 TABLET ORAL at 17:13

## 2024-11-12 RX ADMIN — OXYCODONE HYDROCHLORIDE 7.5 MG: 5 SOLUTION ORAL at 19:21

## 2024-11-12 RX ADMIN — SERTRALINE 100 MG: 100 TABLET, FILM COATED ORAL at 20:18

## 2024-11-12 RX ADMIN — HEPARIN SODIUM 5000 UNITS: 5000 INJECTION INTRAVENOUS; SUBCUTANEOUS at 21:08

## 2024-11-12 RX ADMIN — LIDOCAINE 1 PATCH: 4 PATCH TOPICAL at 08:29

## 2024-11-12 RX ADMIN — Medication 10 ML: at 08:30

## 2024-11-12 RX ADMIN — BUDESONIDE 0.5 MG: 0.5 INHALANT ORAL at 07:48

## 2024-11-12 RX ADMIN — INSULIN HUMAN 2 UNITS: 100 INJECTION, SOLUTION PARENTERAL at 12:17

## 2024-11-12 RX ADMIN — ARFORMOTEROL TARTRATE 15 MCG: 15 SOLUTION RESPIRATORY (INHALATION) at 07:46

## 2024-11-12 RX ADMIN — HEPARIN SODIUM 5000 UNITS: 5000 INJECTION INTRAVENOUS; SUBCUTANEOUS at 12:17

## 2024-11-12 RX ADMIN — SENNOSIDES AND DOCUSATE SODIUM 2 TABLET: 50; 8.6 TABLET ORAL at 08:28

## 2024-11-12 RX ADMIN — TORSEMIDE 100 MG: 100 TABLET ORAL at 08:28

## 2024-11-12 RX ADMIN — MIDODRINE HYDROCHLORIDE 5 MG: 5 TABLET ORAL at 17:13

## 2024-11-12 RX ADMIN — Medication 5 MG: at 23:30

## 2024-11-12 RX ADMIN — Medication 250 MG: at 20:18

## 2024-11-12 RX ADMIN — MIDODRINE HYDROCHLORIDE 5 MG: 5 TABLET ORAL at 08:29

## 2024-11-12 RX ADMIN — ACETAMINOPHEN 1000 MG: 500 TABLET ORAL at 02:12

## 2024-11-12 RX ADMIN — MIDODRINE HYDROCHLORIDE 5 MG: 5 TABLET ORAL at 11:10

## 2024-11-12 RX ADMIN — HEPARIN SODIUM 5000 UNITS: 5000 INJECTION INTRAVENOUS; SUBCUTANEOUS at 05:21

## 2024-11-12 RX ADMIN — PANTOPRAZOLE SODIUM 40 MG: 40 INJECTION, POWDER, FOR SOLUTION INTRAVENOUS at 17:13

## 2024-11-12 RX ADMIN — OXYCODONE HYDROCHLORIDE 7.5 MG: 5 SOLUTION ORAL at 23:30

## 2024-11-12 RX ADMIN — LEVOTHYROXINE SODIUM 175 MCG: 175 TABLET ORAL at 06:46

## 2024-11-12 RX ADMIN — BUDESONIDE 0.5 MG: 0.5 INHALANT ORAL at 20:03

## 2024-11-12 RX ADMIN — PANTOPRAZOLE SODIUM 40 MG: 40 INJECTION, POWDER, FOR SOLUTION INTRAVENOUS at 05:21

## 2024-11-12 RX ADMIN — ATORVASTATIN CALCIUM 40 MG: 20 TABLET, FILM COATED ORAL at 08:28

## 2024-11-12 RX ADMIN — ARIPIPRAZOLE 2 MG: 2 TABLET ORAL at 20:18

## 2024-11-12 NOTE — PROGRESS NOTES
Dedicated to Hospital Care    [phone]   LOS: 11 days     Name: Nelson Wang  Age/Sex: 79 y.o. male  :  1945        PCP: Domingo Bravo MD  No chief complaint on file.     Subjective   Patient is confused on evaluation today.  He does not recognize his wife at the bedside.  His speech is nonsensical.  Seen again later in the day with his daughter at the bedside and continues to not really comprehend the conversations being had.  He asks inappropriate questions that have nothing do with conversation.  Given cognitive status unable to assess review of systems    acetaminophen, 1,000 mg, Nasogastric, Q8H  arformoterol, 15 mcg, Nebulization, BID - RT  ARIPiprazole, 2 mg, Oral, BID  atorvastatin, 40 mg, Oral, Daily  budesonide, 0.5 mg, Nebulization, BID - RT  Check Fentanyl Patch Placement, 1 each, Not Applicable, Q12H  heparin (porcine), 5,000 Units, Subcutaneous, Q8H  insulin regular, 2-7 Units, Subcutaneous, Q6H  levothyroxine, 175 mcg, Nasogastric, Q AM  Lidocaine, 1 patch, Transdermal, Q24H  Lidocaine, 1 patch, Transdermal, Q24H  midodrine, 5 mg, Oral, TID AC  pantoprazole, 40 mg, Intravenous, BID AC  saccharomyces boulardii, 250 mg, Oral, BID  senna-docusate sodium, 2 tablet, Nasogastric, Daily  sertraline, 100 mg, Oral, Nightly  sodium chloride, 10 mL, Intravenous, Q12H  torsemide, 100 mg, Oral, Daily  Vancomycin Pharmacy Intermittent/Pulse Dosing, , Not Applicable, Daily      Pharmacy to dose vancomycin,         Objective   Vital Signs  Temp:  [97.4 °F (36.3 °C)-98.5 °F (36.9 °C)] 98.4 °F (36.9 °C)  Heart Rate:  [] 105  Resp:  [18] 18  BP: (130-152)/(59-80) 149/67  Body mass index is 26.25 kg/m².    Intake/Output Summary (Last 24 hours) at 2024 1414  Last data filed at 2024 0642  Gross per 24 hour   Intake 708 ml   Output --   Net 708 ml       Physical Exam  Vitals reviewed.   Constitutional:       General: He is in acute distress.      Appearance: He is ill-appearing.   HENT:       Nose:      Comments: Cortrak in place  Cardiovascular:      Rate and Rhythm: Normal rate and regular rhythm.   Pulmonary:      Effort: Pulmonary effort is normal. No respiratory distress.   Abdominal:      General: Bowel sounds are normal.      Palpations: Abdomen is soft.   Skin:     General: Skin is warm and dry.   Neurological:      General: No focal deficit present.      Mental Status: He is alert. He is disoriented.           Results Review:       I reviewed the patient's new clinical results.  Results from last 7 days   Lab Units 11/12/24 0440 11/11/24  0539 11/10/24  0623 11/09/24  0356 11/08/24  0534 11/07/24  0445 11/06/24  0527   WBC 10*3/mm3 4.30 4.97 5.82 6.66 7.23 5.72 5.82   HEMOGLOBIN g/dL 8.5* 7.4* 7.2* 8.4* 8.3* 8.0* 8.4*   PLATELETS 10*3/mm3 164 165 159 149 142 143 138*     Results from last 7 days   Lab Units 11/12/24 0440 11/11/24  0539 11/10/24  0622 11/09/24  0356 11/08/24  0534 11/07/24  0631 11/06/24  0527   SODIUM mmol/L 131* 133* 132* 135* 132* 132* 135*   POTASSIUM mmol/L 4.3 3.6 3.3* 3.9 3.7 3.2* 3.5   CHLORIDE mmol/L 98 91* 94* 98 98 99 102   CO2 mmol/L 23.1 23.0 22.7 23.0 22.0 25.3 21.3*   BUN mg/dL 45* 82* 74* 52* 47* 24* 38*   CREATININE mg/dL 4.78* 7.12* 6.65* 5.87* 5.42* 3.99* 7.39*   CALCIUM mg/dL 8.1* 7.9* 8.3* 8.2* 8.4* 8.5* 9.0   MAGNESIUM mg/dL  --   --   --   --   --   --  2.3   PHOSPHORUS mg/dL 6.0* 6.2* 4.2 1.8* 1.0* 1.0* 2.1*   Estimated Creatinine Clearance: 13.2 mL/min (A) (by C-G formula based on SCr of 4.78 mg/dL (H)).      Assessment & Plan   Active Hospital Problems    Diagnosis  POA    **Discitis of lumbar region [M46.46]  Yes    Severe malnutrition [E43]  Yes    Discitis [M46.40]  Yes    Metabolic encephalopathy [G93.41]  Yes    Aspiration pneumonia [J69.0]  Yes    ESRD on dialysis [N18.6, Z99.2]  Not Applicable    Bipolar disorder [F31.9]  Yes    Chronic respiratory failure with hypoxia [J96.11]  Yes    Essential hypertension [I10]  Yes    Anemia due to  chronic kidney disease [N18.9, D63.1]  Yes    Type 2 diabetes mellitus without complication [E11.9]  Yes      Resolved Hospital Problems   No resolved problems to display.       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  -She will need to be on antibiotics through December 14.  She will need weekly labs done and faxed to infectious disease.  -Repeat blood cultures were negative and would appear to have the infection under control but certainly still with issues in the spine.  -I do not have a good explanation for why he continues to have significant encephalopathy and delirium.  Per his daughter at the bedside who is a physician and lives with the patient he has no evidence of dementia at baseline.  However since his previous hospitalization he has had significant delirium and difficulty coping with waxing and waning mentation.  He has not seen a neurologist yet I will ask them to evaluate the patient.  Certainly in the past have seen patients who have these infections have significant delirium that waxes and wanes despite appropriate medical therapy until source control is obtained.  May need to consider MRI of the brain  -He had the aspiration event this morning.  Have made him n.p.o. MRI last week therapy to reevaluate.  It is likely that his p.o. intake will greatly hinge on his overall cognitive function at the present moment.  When he significantly confused or somnolent its likely he is unsafe for p.o. intake.  At baseline he has had some level of oropharyngeal dysphagia in the past.  -Appreciate psychiatry's input  with medications plan to continue meds with dialysis but continues to have impulsive behaviors and issues with confusion.  He does better when his family is at the bedside.  His daughter's been spending nights with him here in the hospital.  -Appreciate nephrology input and assistance remains on HD.  Plan to attempt hemodialysis again tomorrow hopefully able to complete.  In the meantime he is on nocturnal feeds through his core track.  -I have asked neurology evaluate the patient today.  Will follow-up their recommendations  -It is likely that we will need to consider placement of a PEG tube for ongoing nutritional support with overall goals being curative.  -Sadly at this point I think his overall prognosis is pretty guarded.  Palliative care did meet with the patient and  his family earlier in the hospitalization.  Their goals at that time remain the same as they are now which is curative care.  They wanted to return to his baseline of the level of function.  At this point I think that she was probably stable.  After 11 days in the hospital and previous hospitalization prior to this and active infectious issues the likelihood that he returns to his prior level of function is extremely low.  He is not a great candidate for cardiopulmonary resuscitation given his advanced age end-stage renal disease anemia of chronic disease and acute and chronic issues.  He remains a full code at this time      Disposition  Expected Discharge Date: 11/13/2024; Expected Discharge Time:        Donovan Griffiths MD  [facility]  11/12/24  14:14 EST

## 2024-11-12 NOTE — PROGRESS NOTES
Patient Name: Nelson Wang  : 1945    MRN: [id number]                              Today's Date: 2024       Admit Date: 2024    Visit Dx: No diagnosis found.  Patient Active Problem List   Diagnosis    Essential hypertension    Bradycardia, sinus    Anemia due to chronic kidney disease    Hypothyroidism    Idiopathic gout    Proteinuria    Psoriasis    Thrombocytopenia    Type 2 diabetes mellitus without complication    CKD (chronic kidney disease), stage IV    Hyperlipidemia    Monoclonal gammopathy of unknown significance (MGUS)    Stage 5 chronic kidney disease    Chronic gout without tophus    Peritoneal dialysis catheter dysfunction    Medicare annual wellness visit, subsequent    Chronic cough    Peritonitis associated with peritoneal dialysis    Recurrent pneumonia    Acute on chronic respiratory failure with hypoxia    End stage renal disease    Aspiration pneumonitis    Sepsis    Type 2 diabetes mellitus, with long-term current use of insulin    GERD without esophagitis    Immunosuppression due to drug therapy    Bipolar disorder    Chronic respiratory failure with hypoxia    Shortness of breath    Abnormal stress test    ESRD on dialysis    Abnormal chest CT    Encounter for screening for malignant neoplasm of colon    History of colon polyps    Family history of colon cancer    Intractable pain    Abdominal pain    ESRD (end stage renal disease) on dialysis    AMS (altered mental status)    Hallucinations    LUQ pain    Discitis    Metabolic encephalopathy    Aspiration pneumonia    Discitis of lumbar region    Severe malnutrition     Past Medical History:   Diagnosis Date    Abnormal stress test     Anemia     IRON INFUSIONS WITH DIALYSIS    Anesthesia     WITH HIP REPLACEMENT DAUGHTER BELIEVES HAD SVT     Ankle pain, right     Anxiety and depression     CKD (chronic kidney disease), stage IV     DIALYSIS [id number] SHILEY IN RIGHT CHEST    Diabetes     Gout     High  cholesterol     History of peritoneal dialysis     HL (hearing loss)     Hyperkalemia     Hypertension     Hypothyroidism     Insomnia     Night terrors     Psoriasis     Psoriasis     Sleep walking     Thrombocytopenia     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    Vitiligo      Past Surgical History:   Procedure Laterality Date    ANKLE SURGERY Right 11/1990    APPENDECTOMY N/A 1954    ARTERIOVENOUS FISTULA/SHUNT SURGERY Left 07/16/2024    Procedure: Creation of left arm arteriovenous fistula;  Surgeon: Moses Mir MD;  Location: [facility] MAIN OR;  Service: Vascular;  Laterality: Left;    BRONCHOSCOPY N/A 03/01/2024    Procedure: BRONCHOSCOPY WITH BAL AND WASHINGS;  Surgeon: Sandra Espinal MD;  Location: [facility] ENDOSCOPY;  Service: Pulmonary;  Laterality: N/A;  PNEUMONIA    BRONCHOSCOPY N/A 08/30/2024    Procedure: BRONCHOSCOPY WITH BALS AND WASHINGS;  Surgeon: Sandra Espinal MD;  Location: [facility] ENDOSCOPY;  Service: Pulmonary;  Laterality: N/A;  MUCOUS PLUGGING    CARDIAC CATHETERIZATION N/A 05/29/2024    Procedure: Left Heart Cath with possible coronary angioplasty;  Surgeon: Stephan Nichols MD;  Location: [facility] CATH INVASIVE LOCATION;  Service: Cardiology;  Laterality: N/A;    COLONOSCOPY N/A 06/2022    [facility]    ENDOSCOPY N/A 10/28/2024    Procedure: ESOPHAGOGASTRODUODENOSCOPY INSERTION OF LIGHTED INSTRUMENT TO VIEW ESOPHAGUS, STOMACH AND SMALL INTESTINE;  Surgeon: Kingsley Peraza MD;  Location: [facility] ENDOSCOPY;  Service: Gastroenterology;  Laterality: N/A;  Gastritis    HIP BIPOLAR REPLACEMENT Right 01/2000    INSERTION HEMODIALYSIS CATHETER Left 04/09/2024    Procedure: HEMODIALYSIS CATHETER INSERTION;  Surgeon: Jose Berry MD;  Location: [facility] OR;  Service: General;  Laterality: Left;    INSERTION HEMODIALYSIS CATHETER N/A 04/12/2024    Procedure: Tunneled hemodialysis catheter insertion;  Surgeon: Enrique Vinson MD;  Location: [facility] OR;  Service: Vascular;   Laterality: N/A;    INSERTION PERITONEAL DIALYSIS CATHETER N/A 03/27/2023    Procedure: LAPAROSCOPIC INSERTION PERITONEAL DIALYSIS CATHETER, LAPAROSCOPIC OMENTOPEXY WITH LYSIS OF ADHESIONS;  Surgeon: Jose Berry MD;  Location: [facility] MAIN OR;  Service: General;  Laterality: N/A;    INSERTION PERITONEAL DIALYSIS CATHETER Left 07/23/2023    Procedure: REVISION OF PERITONEAL DIALYSIS CATHETER;  Surgeon: Radha Oreilly MD;  Location: [facility] MAIN OR;  Service: General;  Laterality: Left;    REMOVAL PERITONEAL DIALYSIS CATHETER N/A 04/09/2024    Procedure: REMOVAL PERITONEAL DIALYSIS CATHETER;  Surgeon: Jose Berry MD;  Location: [facility] MAIN OR;  Service: General;  Laterality: N/A;    RENAL BIOPSY Left 07/15/2022    UPPER GASTROINTESTINAL ENDOSCOPY      WRIST SURGERY      UNSURE WHICH SIDE DAUGHTER REPORTS HAD SEVERE  CUT FROM WINDOW AND THEY HAD TO DO RECONSTRUCTIVE SURGERY WITH VESSELS AND NERVES      General Information       Row Name 11/12/24 1704          Physical Therapy Time and Intention    Document Type therapy note (daily note)  -     Mode of Treatment physical therapy  -               User Key  (r) = Recorded By, (t) = Taken By, (c) = Cosigned By      Initials Name Provider Type     Rosita Zaman, PT Physical Therapist                   Mobility       Row Name 11/12/24 1704          Bed Mobility    Comment, (Bed Mobility) Up in chair, family present  -       Row Name 11/12/24 1704          Transfers    Comment, (Transfers) STS x 2  -       Row Name 11/12/24 1704          Sit-Stand Transfer    Sit-Stand [place] (Transfers) minimum assist (75% patient effort)  -     Assistive Device (Sit-Stand Transfers) walker, front-wheeled  -       Row Name 11/12/24 1704          Gait/Stairs (Locomotion)    [place] Level (Gait) minimum assist (75% patient effort)  -     Assistive Device (Gait) walker, front-wheeled  -     Distance in Feet (Gait) 20  -      Deviations/Abnormal Patterns (Gait) gait speed decreased;marguerite decreased;stride length decreased  -     Bilateral Gait Deviations forward flexed posture  -     Comment, (Gait/Stairs) Remains a little unsteady, reports lightheadedness upon standing  -               User Key  (r) = Recorded By, (t) = Taken By, (c) = Cosigned By      Initials Name Provider Type    Rosita Orozco, JAYME Physical Therapist                   Obj/Interventions       Row Name 11/12/24 1703          Motor Skills    Therapeutic Exercise --  BLE AP, LAQ, seated marching x 10 reps  -               User Key  (r) = Recorded By, (t) = Taken By, (c) = Cosigned By      Initials Name Provider Type    Rosita Orozco PT Physical Therapist                   Goals/Plan    No documentation.                  Clinical Impression       Row Name 11/12/24 6421          Pain    Pretreatment Pain Rating 0/10 - no pain  -     Posttreatment Pain Rating 0/10 - no pain  -       Row Name 11/12/24 1704          Plan of Care Review    Plan of Care Reviewed With patient;child  -     Outcome Evaluation Patient was up in the chair with daughter present when PT arrived.  Patient was agreeable to therapy.  He was able to stand from the chair with min assist.  He was unsteady upon standing and has to sit back down.  He acknowledged feeling lightheaded upon standing.  After rest break patient was able to stand again holding onto a rolling walker with with min assist.  He was able to ambulate 20 feet with rolling walker and min assist.  Gait was slow and patient fatigued quickly with increased unsteadiness noted with fatigue.  Patient tolerated bilateral lower extremity exercises x 10 prior to ambulating.  He did acknowledge feeling lightheaded near end of walk but resolved quickly once he was in the chair.  Educated patient and family on lower extremity exercise and increasing ambulation with nursing.  -       Row Name 11/12/24 6817           Positioning and Restraints    Pre-Treatment Position sitting in chair/recliner  No alarm, family present  -FLIP     Post Treatment Position chair  -KH     In Chair sitting;call light within reach;encouraged to call for assist;with family/caregiver  -               User Key  (r) = Recorded By, (t) = Taken By, (c) = Cosigned By      Initials Name Provider Type    Rosita Orozco, PT Physical Therapist                   Outcome Measures       Row Name 11/12/24 1708 11/12/24 0807       How much help from another person do you currently need...    Turning from your back to your side while in flat bed without using bedrails? 3  -KH 4  -BC    Moving from lying on back to sitting on the side of a flat bed without bedrails? 3  -KH 4  -BC    Moving to and from a bed to a chair (including a wheelchair)? 3  -KH 4  -BC    Standing up from a chair using your arms (e.g., wheelchair, bedside chair)? 3  -KH 4  -BC    Climbing 3-5 steps with a railing? 2  -KH 3  -BC    To walk in hospital room? 3  -KH 3  -BC    AM-PAC 6 Clicks Score (PT) 17  -KH 22  -BC    Highest Level of Mobility Goal 5 --> Static standing  -KH 7 --> Walk 25 feet or more  -BC      Row Name 11/12/24 1708          Functional Assessment    Outcome Measure Options AM-PAC 6 Clicks Basic Mobility (PT)  -               User Key  (r) = Recorded By, (t) = Taken By, (c) = Cosigned By      Initials Name Provider Type    Rosita Orozco, PT Physical Therapist    Josephine Barone, RN Registered Nurse                                 Physical Therapy Education       Title: PT OT SLP Therapies (Done)       Topic: Physical Therapy (Done)       Point: Mobility training (Done)       Learning Progress Summary            Patient Acceptance, E, VU by HUY at 11/10/2024 1835    Acceptance, E, VU by UHY at 11/9/2024 1717    Acceptance, E,D, VU,NR by EB at 11/8/2024 1016    Acceptance, E,D, VU,NR by EB at 11/7/2024 1142    Acceptance, E,D, VU,NR by EB at  11/6/2024 1042    Acceptance, E,TB, NR by MS at 11/5/2024 1538    Acceptance, E, NL,NR by KT at 11/4/2024 1144                      Point: Home exercise program (Done)       Learning Progress Summary            Patient Acceptance, E, VU by WG at 11/10/2024 1835    Acceptance, E, VU by WG at 11/9/2024 1717    Acceptance, E,D, VU,NR by EB at 11/8/2024 1016    Acceptance, E,D, VU,NR by EB at 11/7/2024 1142    Acceptance, E,D, VU,NR by EB at 11/6/2024 1042    Acceptance, E,TB, NR by MS at 11/5/2024 1538    Acceptance, E, NL,NR by KT at 11/4/2024 1144                      Point: Body mechanics (Done)       Learning Progress Summary            Patient Acceptance, E, VU by WG at 11/10/2024 1835    Acceptance, E, VU by WG at 11/9/2024 1717    Acceptance, E,D, VU,NR by EB at 11/8/2024 1016    Acceptance, E,D, VU,NR by EB at 11/7/2024 1142    Acceptance, E,D, VU,NR by EB at 11/6/2024 1042    Acceptance, E,TB, NR by MS at 11/5/2024 1538    Acceptance, E, NL,NR by KT at 11/4/2024 1144                      Point: Precautions (Done)       Learning Progress Summary            Patient Acceptance, E, VU by WG at 11/10/2024 1835    Acceptance, E, VU by WG at 11/9/2024 1717    Acceptance, E,D, VU,NR by EB at 11/8/2024 1016    Acceptance, E,D, VU,NR by EB at 11/7/2024 1142    Acceptance, E,TB, NR by MS at 11/5/2024 1538    Acceptance, E, NL,NR by KT at 11/4/2024 1144                                      User Key       Initials Effective Dates Name Provider Type Discipline    MS 06/16/21 -  Twila Puente PT Physical Therapist PT    EB 02/14/23 -  Gema Crespo PTA Physical Therapist Assistant PT    KT 09/04/24 -  Clara Ortiz RN Registered Nurse Nurse    WG 06/12/24 -  Rustam Rush RN Registered Nurse Nurse                  PT Recommendation and Plan     Outcome Evaluation: Patient was up in the chair with daughter present when PT arrived.  Patient was agreeable to therapy.  He was able to stand from the chair with  min assist.  He was unsteady upon standing and has to sit back down.  He acknowledged feeling lightheaded upon standing.  After rest break patient was able to stand again holding onto a rolling walker with with min assist.  He was able to ambulate 20 feet with rolling walker and min assist.  Gait was slow and patient fatigued quickly with increased unsteadiness noted with fatigue.  Patient tolerated bilateral lower extremity exercises x 10 prior to ambulating.  He did acknowledge feeling lightheaded near end of walk but resolved quickly once he was in the chair.  Educated patient and family on lower extremity exercise and increasing ambulation with nursing.     Time Calculation:         PT Charges       Row Name 11/12/24 1708             Time Calculation    Start Time 1450  -KH      Stop Time 1505  -KH      Time Calculation (min) 15 min  -KH      PT Received On 11/12/24  -FLIP      PT - Next Appointment 11/13/24  -         Time Calculation- PT    Total Timed Code Minutes- PT 15 minute(s)  -KH         Timed Charges    23097 - PT Therapeutic Exercise Minutes 15  -KH         Total Minutes    Timed Charges Total Minutes 15  -KH       Total Minutes 15  -KH                User Key  (r) = Recorded By, (t) = Taken By, (c) = Cosigned By      Initials Name Provider Type    Rosita Orozco, PT Physical Therapist                  Therapy Charges for Today       Code Description Service Date Service Provider Modifiers Qty    [id number] HC PT THER PROC EA 15 MIN 11/12/2024 Rosita Zaman, PT GP 1            PT G-Codes  Outcome Measure Options: AM-PAC 6 Clicks Basic Mobility (PT)  AM-PAC 6 Clicks Score (PT): 17       Rosita Zaman, PT  11/12/2024

## 2024-11-12 NOTE — PROGRESS NOTES
The patient seems much more awake and alert though family reports some lingering confusion.  I have explained to him that he is now receiving Ativan 1 mg 30 minutes prior to each dialysis treatment but he seems to have some difficulty processing this information.

## 2024-11-12 NOTE — THERAPY EVALUATION
Acute Care - Speech Language Pathology   Swallow Re-Evaluation [facility]     Patient Name: Nelson Wang  : 1945  MRN: [id number]  Today's Date: 2024               Admit Date: 2024    Visit Dx:   No diagnosis found.  Patient Active Problem List   Diagnosis    Essential hypertension    Bradycardia, sinus    Anemia due to chronic kidney disease    Hypothyroidism    Idiopathic gout    Proteinuria    Psoriasis    Thrombocytopenia    Type 2 diabetes mellitus without complication    CKD (chronic kidney disease), stage IV    Hyperlipidemia    Monoclonal gammopathy of unknown significance (MGUS)    Stage 5 chronic kidney disease    Chronic gout without tophus    Peritoneal dialysis catheter dysfunction    Medicare annual wellness visit, subsequent    Chronic cough    Peritonitis associated with peritoneal dialysis    Recurrent pneumonia    Acute on chronic respiratory failure with hypoxia    End stage renal disease    Aspiration pneumonitis    Sepsis    Type 2 diabetes mellitus, with long-term current use of insulin    GERD without esophagitis    Immunosuppression due to drug therapy    Bipolar disorder    Chronic respiratory failure with hypoxia    Shortness of breath    Abnormal stress test    ESRD on dialysis    Abnormal chest CT    Encounter for screening for malignant neoplasm of colon    History of colon polyps    Family history of colon cancer    Intractable pain    Abdominal pain    ESRD (end stage renal disease) on dialysis    AMS (altered mental status)    Hallucinations    LUQ pain    Discitis    Metabolic encephalopathy    Aspiration pneumonia    Discitis of lumbar region    Severe malnutrition     Past Medical History:   Diagnosis Date    Abnormal stress test     Anemia     IRON INFUSIONS WITH DIALYSIS    Anesthesia     WITH HIP REPLACEMENT DAUGHTER BELIEVES HAD SVT     Ankle pain, right     Anxiety and depression     CKD (chronic kidney disease), stage IV     DIALYSIS  [id number] SHILEY IN RIGHT CHEST    Diabetes     Gout     High cholesterol     History of peritoneal dialysis     HL (hearing loss)     Hyperkalemia     Hypertension     Hypothyroidism     Insomnia     Night terrors     Psoriasis     Psoriasis     Sleep walking     Thrombocytopenia     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    Vitiligo      Past Surgical History:   Procedure Laterality Date    ANKLE SURGERY Right 11/1990    APPENDECTOMY N/A 1954    ARTERIOVENOUS FISTULA/SHUNT SURGERY Left 07/16/2024    Procedure: Creation of left arm arteriovenous fistula;  Surgeon: Moses Mir MD;  Location: [facility] MAIN OR;  Service: Vascular;  Laterality: Left;    BRONCHOSCOPY N/A 03/01/2024    Procedure: BRONCHOSCOPY WITH BAL AND WASHINGS;  Surgeon: Sandra Espinal MD;  Location: [facility] ENDOSCOPY;  Service: Pulmonary;  Laterality: N/A;  PNEUMONIA    BRONCHOSCOPY N/A 08/30/2024    Procedure: BRONCHOSCOPY WITH BALS AND WASHINGS;  Surgeon: Sandra Espinal MD;  Location: [facility] ENDOSCOPY;  Service: Pulmonary;  Laterality: N/A;  MUCOUS PLUGGING    CARDIAC CATHETERIZATION N/A 05/29/2024    Procedure: Left Heart Cath with possible coronary angioplasty;  Surgeon: Stephan Nichols MD;  Location: [facility] CATH INVASIVE LOCATION;  Service: Cardiology;  Laterality: N/A;    COLONOSCOPY N/A 06/2022    [facility]    ENDOSCOPY N/A 10/28/2024    Procedure: ESOPHAGOGASTRODUODENOSCOPY INSERTION OF LIGHTED INSTRUMENT TO VIEW ESOPHAGUS, STOMACH AND SMALL INTESTINE;  Surgeon: Kingsley Peraza MD;  Location: [facility] ENDOSCOPY;  Service: Gastroenterology;  Laterality: N/A;  Gastritis    HIP BIPOLAR REPLACEMENT Right 01/2000    INSERTION HEMODIALYSIS CATHETER Left 04/09/2024    Procedure: HEMODIALYSIS CATHETER INSERTION;  Surgeon: Jose Berry MD;  Location: [facility] OR;  Service: General;  Laterality: Left;    INSERTION HEMODIALYSIS CATHETER N/A 04/12/2024    Procedure: Tunneled hemodialysis catheter insertion;   Surgeon: Enrique Vinson MD;  Location: [facility] MAIN OR;  Service: Vascular;  Laterality: N/A;    INSERTION PERITONEAL DIALYSIS CATHETER N/A 03/27/2023    Procedure: LAPAROSCOPIC INSERTION PERITONEAL DIALYSIS CATHETER, LAPAROSCOPIC OMENTOPEXY WITH LYSIS OF ADHESIONS;  Surgeon: Jose Berry MD;  Location[facility] OR;  Service: General;  Laterality: N/A;    INSERTION PERITONEAL DIALYSIS CATHETER Left 07/23/2023    Procedure: REVISION OF PERITONEAL DIALYSIS CATHETER;  Surgeon: Radha Oreilly MD;  [facility];  Service: General;  Laterality: Left;    REMOVAL PERITONEAL DIALYSIS CATHETER N/A 04/09/2024    Procedure: REMOVAL PERITONEAL DIALYSIS CATHETER;  Surgeon: Jose Berry MD;  [facility];  Service: General;  Laterality: N/A;    RENAL BIOPSY Left 07/15/2022    UPPER GASTROINTESTINAL ENDOSCOPY      WRIST SURGERY      UNSURE WHICH SIDE DAUGHTER REPORTS HAD SEVERE  CUT FROM WINDOW AND THEY HAD TO DO RECONSTRUCTIVE SURGERY WITH VESSELS AND NERVES       SLP Recommendation and Plan  SLP Swallowing Diagnosis: mild, oral dysphagia (11/12/24 1000)  SLP Diet Recommendation: mechanical ground textures, thin liquids (11/12/24 1000)  Recommended Precautions and Strategies: upright posture during/after eating, small bites of food and sips of liquid, assist with feeding (11/12/24 1000)  SLP Rec. for Method of Medication Administration: meds whole, meds crushed, with thin liquids, with puree, as tolerated (11/12/24 1000)     Monitor for Signs of Aspiration: yes, notify SLP if any concerns (11/12/24 1000)  Recommended Diagnostics: reassess via clinical swallow evaluation (11/12/24 1000)  Swallow Criteria for Skilled Therapeutic Interventions Met: demonstrates skilled criteria (11/12/24 1000)  Anticipated Discharge Disposition (SLP): unknown (11/12/24 1000)  Rehab Potential/Prognosis, Swallowing: good, to achieve stated therapy goals (11/12/24 1000)  Therapy Frequency  (Swallow): PRN (11/12/24 1000)  Predicted Duration Therapy Intervention (Days): until discharge (11/12/24 1000)  Oral Care Recommendations: Oral Care BID/PRN (11/12/24 1000)                                        Outcome Evaluation: Pt seen for re-evaluation of swallow.  (Wife present with some difficulty communicating, stating she has had a stroke).RN reports pt with coughing/choking and wet vocal sounds this am.  Pt awoken and positioned upright in bed.  Pt provided thin liquid, puree, and soft solids.  Pt demonstrated no overt s/s aspiration with any consistency.  Oral residue observed with partially masticated peaches removed from lateral sulcus. Wife reported bringing  food yesterday which pt ate.  Stated she brought softer food such as cooked vegetables rather than meat.  REC mechanical ground diet with thin liquids.  Pt to be alert and upright with all po.  Discussed recommendations and importance of pt being awake and alert with any po intake.  Wife acknowledged understanding.      SWALLOW EVALUATION (Last 72 Hours)       SLP Adult Swallow Evaluation       Row Name 11/12/24 1000                   Rehab Evaluation    Document Type re-evaluation  -SA        Subjective Information no complaints  -SA        Patient Observations alert;cooperative  -SA        Patient/Family/Caregiver Comments/Observations wife present  -SA        Patient Effort good  -SA        Symptoms Noted During/After Treatment none  -SA           General Information    Patient Profile Reviewed yes  -SA        Pertinent History Of Current Problem adm w/ discitis of lumbar region; VFSS rec soft, chopped, thin; reports of choking and gurgling w/ breakfast (wife feeding pt).  -SA        Current Method of Nutrition soft to chew textures;chopped;thin liquids  -SA        Precautions/Limitations, Vision WFL;for purposes of eval  -SA        Precautions/Limitations, Hearing WFL;for purposes of eval  -SA        Prior Level of  Function-Communication unknown  -SA        Prior Level of Function-Swallowing other (see comments)  VFSS 2/9/24 recommended regular and NTL. Pt was non-compliant with thickened liquids after 2 weeks.  Choking w/ breakfast this am  -        Plans/Goals Discussed with patient and family;agreed upon  -        Barriers to Rehab previous functional deficit;cognitive status  -SA        Patient's Goals for Discharge patient did not state  -SA        Family Goals for Discharge patient able to return to regular diet  -SA           Pain    Pretreatment Pain Rating 0/10 - no pain  -SA        Posttreatment Pain Rating 0/10 - no pain  -SA           Clinical Swallow Eval    Oral Prep Phase WFL  -SA        Oral Transit WFL  -SA        Oral Residue impaired  -SA        Pharyngeal Phase no overt signs/symptoms of pharyngeal impairment  -SA        Esophageal Phase unremarkable  -SA           Oral Residue Concerns    Oral Residue Concerns diffuse residue throughout oral cavity  -SA        Diffuse Residue Throughout Oral Cavity mechanical soft  -SA           SLP Evaluation Clinical Impression    SLP Swallowing Diagnosis mild;oral dysphagia  -SA        Functional Impact risk of aspiration/pneumonia  -SA        Rehab Potential/Prognosis, Swallowing good, to achieve stated therapy goals  -SA        Swallow Criteria for Skilled Therapeutic Interventions Met demonstrates skilled criteria  -SA           Recommendations    Therapy Frequency (Swallow) PRN  -SA        Predicted Duration Therapy Intervention (Days) until discharge  -SA        SLP Diet Recommendation mechanical ground textures;thin liquids  -SA        Recommended Diagnostics reassess via clinical swallow evaluation  -SA        Recommended Precautions and Strategies upright posture during/after eating;small bites of food and sips of liquid;assist with feeding  -SA        Oral Care Recommendations Oral Care BID/PRN  -SA        SLP Rec. for Method of Medication Administration  meds whole;meds crushed;with thin liquids;with puree;as tolerated  -        Monitor for Signs of Aspiration yes;notify SLP if any concerns  -        Anticipated Discharge Disposition (SLP) unknown  -                  User Key  (r) = Recorded By, (t) = Taken By, (c) = Cosigned By      Initials Name Effective Dates    Ale May SLP 01/11/24 -                     EDUCATION  The patient has been educated in the following areas:   Dysphagia (Swallowing Impairment) Oral Care/Hydration Modified Diet Instruction.                Time Calculation:    Time Calculation- SLP       Row Name 11/12/24 1509             Time Calculation- SLP    SLP Start Time 1000  -      SLP Received On 11/12/24  -                User Key  (r) = Recorded By, (t) = Taken By, (c) = Cosigned By      Initials Name Provider Type    Ale May SLP Speech and Language Pathologist                    Therapy Charges for Today       Code Description Service Date Service Provider Modifiers Qty    [id number] HC ST TREATMENT SWALLOW 4 11/12/2024 Ale Lambert SLP GN 1                 ADILSON Norris  11/12/2024

## 2024-11-12 NOTE — PROGRESS NOTES
[facility] Progress Note      Patient Name: Nelson Wang  : 1945  MRN: [id number]  Primary Care Physician:  Domingo Bravo MD  Date of admission: 2024    Subjective     Interval History:   Follow up end stage renal disease      The patient had dialysis yesterday but the treatment was stopped short because of him being agitated and had an infiltration, this morning he feels fine awake and alert denies any chest pain or shortness of air, no nausea or vomiting    Review of Systems:   As noted above    Objective     Vitals:   Temp:  [97.4 °F (36.3 °C)-98.5 °F (36.9 °C)] 98.5 °F (36.9 °C)  Heart Rate:  [] 105  Resp:  [18] 18  BP: (130-152)/(59-95) 152/59  Flow (L/min) (Oxygen Therapy):  [2] 2    Intake/Output Summary (Last 24 hours) at 2024 1056  Last data filed at 2024 0642  Gross per 24 hour   Intake 887.58 ml   Output 400 ml   Net 487.58 ml       Physical Exam:    General Appearance: Chronically ill, awake, frail  HEENT: Core track in place  Neck: No JVD  Lungs: Scattered rhonchi, unlabored breathing  Heart: RRR, no rub  Abdomen: soft, nontender, nondistended  Extremities: no edema, LUE AVF with thrill and bruit he has hematoma over the AV fistula.  There is a small hematoma over the AV fistula  Neuro: Moving all extremities speech improved    Scheduled Meds:     acetaminophen, 1,000 mg, Nasogastric, Q8H  arformoterol, 15 mcg, Nebulization, BID - RT  ARIPiprazole, 2 mg, Oral, BID  atorvastatin, 40 mg, Oral, Daily  budesonide, 0.5 mg, Nebulization, BID - RT  Check Fentanyl Patch Placement, 1 each, Not Applicable, Q12H  heparin (porcine), 5,000 Units, Subcutaneous, Q8H  insulin regular, 2-7 Units, Subcutaneous, Q6H  levothyroxine, 175 mcg, Nasogastric, Q AM  Lidocaine, 1 patch, Transdermal, Q24H  Lidocaine, 1 patch, Transdermal, Q24H  midodrine, 5 mg, Oral, TID AC  pantoprazole, 40 mg, Intravenous, BID AC  saccharomyces boulardii, 250 mg, Oral,  BID  senna-docusate sodium, 2 tablet, Nasogastric, Daily  sertraline, 100 mg, Oral, Nightly  sodium chloride, 10 mL, Intravenous, Q12H  torsemide, 100 mg, Oral, Daily  Vancomycin Pharmacy Intermittent/Pulse Dosing, , Not Applicable, Daily      IV Meds:   Pharmacy to dose vancomycin,         Results Reviewed:   I have personally reviewed the results from the time of this admission to 11/12/2024 10:56 EST     Results from last 7 days   Lab Units 11/12/24  0440 11/11/24  0539 11/10/24  0622 11/07/24  0631 11/06/24  0527   SODIUM mmol/L 131* 133* 132*   < > 135*   POTASSIUM mmol/L 4.3 3.6 3.3*   < > 3.5   CHLORIDE mmol/L 98 91* 94*   < > 102   CO2 mmol/L 23.1 23.0 22.7   < > 21.3*   BUN mg/dL 45* 82* 74*   < > 38*   CREATININE mg/dL 4.78* 7.12* 6.65*   < > 7.39*   CALCIUM mg/dL 8.1* 7.9* 8.3*   < > 9.0   BILIRUBIN mg/dL  --   --  0.4  --  0.4   ALK PHOS U/L  --   --  70  --  70   ALT (SGPT) U/L  --   --  6  --  <5   AST (SGOT) U/L  --   --  18  --  19   GLUCOSE mg/dL 150* 165* 194*   < > 156*    < > = values in this interval not displayed.     Estimated Creatinine Clearance: 13.2 mL/min (A) (by C-G formula based on SCr of 4.78 mg/dL (H)).  Results from last 7 days   Lab Units 11/12/24  0440 11/11/24  0539 11/10/24  0622 11/07/24  0631 11/06/24  0527   MAGNESIUM mg/dL  --   --   --   --  2.3   PHOSPHORUS mg/dL 6.0* 6.2* 4.2   < > 2.1*    < > = values in this interval not displayed.         Results from last 7 days   Lab Units 11/12/24  0440 11/11/24  0539 11/10/24  0623 11/09/24  0356 11/08/24  0534   WBC 10*3/mm3 4.30 4.97 5.82 6.66 7.23   HEMOGLOBIN g/dL 8.5* 7.4* 7.2* 8.4* 8.3*   PLATELETS 10*3/mm3 164 165 159 149 142           Assessment / Plan     ASSESSMENT:  End-stage renal disease - On home hemodialysis as an outpatient, was receiving 5 times a week (Dr Gloria suarez), via RIJ TDC due to LUE AVF dysfunction (placed in July).  Catheter has now been removed.  Plan for dialysis today, sodium is 131 potassium is  4.3   Anemia of chronic kidney disease on long-acting RONALD as an outpatient.  He received 1 unit of packed RBCs yesterday with dialysis hemoglobin up to 8.5   hypertension with chronic kidney disease- Last blood pressure recording was elevated.  Hypothyroidism on replacement therapy  Acute encephalopathy   T5/6 discitis/osteomyelitis -followed by MRI with contrast 11/2 shows L1/2 & T5/6 discitis  Coag negative staph bacteremia currently on vancomycin  Hypophosphatemia, improved with replacement, phosphorus is 6, today currently on tube feed difficult to give binders    PLAN:  Hemodialysis tomorrow  Continue the same treatment  Surveillance labs    I reviewed the chart and other providers notes, reviewed labs.  I discussed the case with the patient and his wife at the bedside   Copied text in this note has been reviewed and is accurate as of 11/12/24.           Bart Wolf MD  11/12/24  10:56 EST    [facility]  [phone]

## 2024-11-12 NOTE — PLAN OF CARE
Goal Outcome Evaluation:  Plan of Care Reviewed With: patient, child           Outcome Evaluation: Patient was up in the chair with daughter present when PT arrived.  Patient was agreeable to therapy.  He was able to stand from the chair with min assist.  He was unsteady upon standing and has to sit back down.  He acknowledged feeling lightheaded upon standing.  After rest break patient was able to stand again holding onto a rolling walker with with min assist.  He was able to ambulate 20 feet with rolling walker and min assist.  Gait was slow and patient fatigued quickly with increased unsteadiness noted with fatigue.  Patient tolerated bilateral lower extremity exercises x 10 prior to ambulating.  He did acknowledge feeling lightheaded near end of walk but resolved quickly once he was in the chair.  Educated patient and family on lower extremity exercise and increasing ambulation with nursing.

## 2024-11-12 NOTE — CONSULTS
"Neurology Consult Note    Consult Date: 11/12/2024    Referring MD: Dr. Griffiths    Reason for Consult I have been asked to see the patient in neurological consultation to render advice and opinion regarding altered mental status    Nelson Wang is a 79 y.o. male with history of CKD, hypertension, admitted for osteomyelitis/discitis on long term antibiotics.  In the setting of that he has developed persistent mental status changes.  Speech has been tangential and he has been intermittently disoriented.  I was consulted to evaluate him for that.  Per family report he does not have a previous history of dementia.    Past Medical History:   Diagnosis Date    Abnormal stress test     Anemia     IRON INFUSIONS WITH DIALYSIS    Anesthesia     WITH HIP REPLACEMENT DAUGHTER BELIEVES HAD SVT 2000    Ankle pain, right     Anxiety and depression     CKD (chronic kidney disease), stage IV     DIALYSIS [id number] SHILEY IN RIGHT CHEST    Diabetes     Gout     High cholesterol     History of peritoneal dialysis     HL (hearing loss)     Hyperkalemia     Hypertension     Hypothyroidism     Insomnia     Night terrors     Psoriasis     Psoriasis     Sleep walking     Thrombocytopenia     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    Vitiligo        Exam  /84 (BP Location: Right arm, Patient Position: Lying)   Pulse 103   Temp 98.1 °F (36.7 °C) (Oral)   Resp 18   Ht 167 cm (65.75\")   Wt 73.2 kg (161 lb 6 oz)   SpO2 96%   BMI 26.25 kg/m²   Gen: NAD, vitals reviewed  MS: Oriented x 2, memory impaired, fluctuating inattention, language intact, no neglect  CN: visual acuity grossly normal, PERRL, EOMI, no facial droop, no dysarthria  Motor: 5/5 throughout upper and lower extremities, normal tone, + asterixis    DATA:    Lab Results   Component Value Date    GLUCOSE 150 (H) 11/12/2024    CALCIUM 8.1 (L) 11/12/2024     (L) 11/12/2024    K 4.3 11/12/2024    CO2 23.1 11/12/2024    CL 98 11/12/2024    BUN 45 (H) " 11/12/2024    CREATININE 4.78 (H) 11/12/2024    EGFRIFAFRI 27 (L) 03/24/2022    EGFRIFNONA 28 (L) 02/23/2022    BCR 9.4 11/12/2024    ANIONGAP 9.9 11/12/2024     Lab Results   Component Value Date    WBC 4.30 11/12/2024    HGB 8.5 (L) 11/12/2024    HCT 26.9 (L) 11/12/2024    MCV 90.9 11/12/2024     11/12/2024       Lab review: CBC, BMP reviewed    Imaging review: CT head from this hospitalization personally reviewed which shows moderate verging on severe generalized atrophy.  Radiology report reviewed.  No acute findings noted    Diagnoses:  Delirium  End-stage renal disease  Discitis/osteomyelitis    Comment: On exam the patient has fluctuating inattention with disorientation and some tangential thought content.  He has asterixis which is suggestive of metabolic or septic encephalopathy.  However I still think it is possible his symptoms may just be representative of hospital delirium.  I think we should mobilize him more during the day to see if his mental status improves.    PLAN:  Out of bed to chair for several hours daily if possible  On IV antibiotics for discitis/osteomyelitis    Discussed with Dr. Griffiths. Will see intermittently

## 2024-11-12 NOTE — PLAN OF CARE
Pt ao to self, pt aspirated this morning, SLP re-evaluated and put him on a [facility] ground diet, neuro consulted for mental status changes, dialysis orders called in

## 2024-11-12 NOTE — PLAN OF CARE
Goal Outcome Evaluation:  Plan of Care Reviewed With: patient, spouse           Outcome Evaluation: VSS overnight. Pt combative and agitated at the beginning of shift, calmed down with pain medications. Tolerated tube feeds. Family at bedside overnight. Will continue to monitor and await further orders.

## 2024-11-12 NOTE — PLAN OF CARE
Goal Outcome Evaluation:  Plan of Care Reviewed With: patient, spouse           Outcome Evaluation: Pt seen for re-evaluation of swallow.  (Wife present with some difficulty communicating, stating she has had a stroke).RN reports pt with coughing/choking and wet vocal sounds this am.  Pt awoken and positioned upright in bed.  Pt provided thin liquid, puree, and soft solids.  Pt demonstrated no overt s/s aspiration with any consistency.  Oral residue observed with partially masticated peaches removed from lateral sulcus. Wife reported bringing [place] food yesterday which pt ate.  Stated she brought softer food such as cooked vegetables rather than meat.  REC mechanical ground diet with thin liquids.  Pt to be alert and upright with all po.  Discussed recommendations and importance of pt being awake and alert with any po intake.  Wife acknowledged understanding.

## 2024-11-13 LAB
ALBUMIN SERPL-MCNC: 3 G/DL (ref 3.5–5.2)
ANION GAP SERPL CALCULATED.3IONS-SCNC: 15 MMOL/L (ref 5–15)
BASOPHILS # BLD AUTO: 0.03 10*3/MM3 (ref 0–0.2)
BASOPHILS NFR BLD AUTO: 0.6 % (ref 0–1.5)
BUN SERPL-MCNC: 62 MG/DL (ref 8–23)
BUN/CREAT SERPL: 10.4 (ref 7–25)
CALCIUM SPEC-SCNC: 8.3 MG/DL (ref 8.6–10.5)
CHLORIDE SERPL-SCNC: 96 MMOL/L (ref 98–107)
CO2 SERPL-SCNC: 21 MMOL/L (ref 22–29)
CREAT SERPL-MCNC: 5.99 MG/DL (ref 0.76–1.27)
DEPRECATED RDW RBC AUTO: 51.4 FL (ref 37–54)
EGFRCR SERPLBLD CKD-EPI 2021: 9 ML/MIN/1.73
EOSINOPHIL # BLD AUTO: 0.13 10*3/MM3 (ref 0–0.4)
EOSINOPHIL NFR BLD AUTO: 2.6 % (ref 0.3–6.2)
ERYTHROCYTE [DISTWIDTH] IN BLOOD BY AUTOMATED COUNT: 15.2 % (ref 12.3–15.4)
GLUCOSE BLDC GLUCOMTR-MCNC: 106 MG/DL (ref 70–130)
GLUCOSE BLDC GLUCOMTR-MCNC: 141 MG/DL (ref 70–130)
GLUCOSE BLDC GLUCOMTR-MCNC: 86 MG/DL (ref 70–130)
GLUCOSE SERPL-MCNC: 137 MG/DL (ref 65–99)
HCT VFR BLD AUTO: 28.3 % (ref 37.5–51)
HGB BLD-MCNC: 8.5 G/DL (ref 13–17.7)
IMM GRANULOCYTES # BLD AUTO: 0.03 10*3/MM3 (ref 0–0.05)
IMM GRANULOCYTES NFR BLD AUTO: 0.6 % (ref 0–0.5)
LYMPHOCYTES # BLD AUTO: 1.22 10*3/MM3 (ref 0.7–3.1)
LYMPHOCYTES NFR BLD AUTO: 24.4 % (ref 19.6–45.3)
MCH RBC QN AUTO: 27.8 PG (ref 26.6–33)
MCHC RBC AUTO-ENTMCNC: 30 G/DL (ref 31.5–35.7)
MCV RBC AUTO: 92.5 FL (ref 79–97)
MONOCYTES # BLD AUTO: 0.7 10*3/MM3 (ref 0.1–0.9)
MONOCYTES NFR BLD AUTO: 14 % (ref 5–12)
NEUTROPHILS NFR BLD AUTO: 2.9 10*3/MM3 (ref 1.7–7)
NEUTROPHILS NFR BLD AUTO: 57.8 % (ref 42.7–76)
NRBC BLD AUTO-RTO: 0 /100 WBC (ref 0–0.2)
PHOSPHATE SERPL-MCNC: 5.5 MG/DL (ref 2.5–4.5)
PLATELET # BLD AUTO: 171 10*3/MM3 (ref 140–450)
PMV BLD AUTO: 10.7 FL (ref 6–12)
POTASSIUM SERPL-SCNC: 4.1 MMOL/L (ref 3.5–5.2)
RBC # BLD AUTO: 3.06 10*6/MM3 (ref 4.14–5.8)
SODIUM SERPL-SCNC: 132 MMOL/L (ref 136–145)
VANCOMYCIN SERPL-MCNC: 18.4 MCG/ML (ref 5–40)
WBC NRBC COR # BLD AUTO: 5.01 10*3/MM3 (ref 3.4–10.8)

## 2024-11-13 PROCEDURE — 94761 N-INVAS EAR/PLS OXIMETRY MLT: CPT

## 2024-11-13 PROCEDURE — 25010000002 OLANZAPINE 10 MG RECONSTITUTED SOLUTION: Performed by: SPECIALIST

## 2024-11-13 PROCEDURE — 94799 UNLISTED PULMONARY SVC/PX: CPT

## 2024-11-13 PROCEDURE — 25010000002 HEPARIN (PORCINE) PER 1000 UNITS: Performed by: STUDENT IN AN ORGANIZED HEALTH CARE EDUCATION/TRAINING PROGRAM

## 2024-11-13 PROCEDURE — 82948 REAGENT STRIP/BLOOD GLUCOSE: CPT

## 2024-11-13 PROCEDURE — 80202 ASSAY OF VANCOMYCIN: CPT | Performed by: NURSE PRACTITIONER

## 2024-11-13 PROCEDURE — 25010000002 VANCOMYCIN PER 500 MG: Performed by: NURSE PRACTITIONER

## 2024-11-13 PROCEDURE — 94664 DEMO&/EVAL PT USE INHALER: CPT

## 2024-11-13 PROCEDURE — 85025 COMPLETE CBC W/AUTO DIFF WBC: CPT | Performed by: INTERNAL MEDICINE

## 2024-11-13 PROCEDURE — 80069 RENAL FUNCTION PANEL: CPT | Performed by: INTERNAL MEDICINE

## 2024-11-13 RX ORDER — AMOXICILLIN 250 MG
2 CAPSULE ORAL DAILY
Status: DISCONTINUED | OUTPATIENT
Start: 2024-11-14 | End: 2024-11-18 | Stop reason: HOSPADM

## 2024-11-13 RX ORDER — ACETAMINOPHEN 500 MG
1000 TABLET ORAL EVERY 8 HOURS
Status: DISCONTINUED | OUTPATIENT
Start: 2024-11-13 | End: 2024-11-18 | Stop reason: HOSPADM

## 2024-11-13 RX ADMIN — ATORVASTATIN CALCIUM 40 MG: 20 TABLET, FILM COATED ORAL at 08:08

## 2024-11-13 RX ADMIN — ARIPIPRAZOLE 2 MG: 2 TABLET ORAL at 22:52

## 2024-11-13 RX ADMIN — ARFORMOTEROL TARTRATE 15 MCG: 15 SOLUTION RESPIRATORY (INHALATION) at 19:14

## 2024-11-13 RX ADMIN — LEVOTHYROXINE SODIUM 175 MCG: 175 TABLET ORAL at 06:36

## 2024-11-13 RX ADMIN — ACETAMINOPHEN 1000 MG: 500 TABLET ORAL at 01:03

## 2024-11-13 RX ADMIN — SERTRALINE 100 MG: 100 TABLET, FILM COATED ORAL at 22:51

## 2024-11-13 RX ADMIN — MIDODRINE HYDROCHLORIDE 5 MG: 5 TABLET ORAL at 06:36

## 2024-11-13 RX ADMIN — TORSEMIDE 100 MG: 100 TABLET ORAL at 08:07

## 2024-11-13 RX ADMIN — LIDOCAINE 1 PATCH: 4 PATCH TOPICAL at 08:07

## 2024-11-13 RX ADMIN — PANTOPRAZOLE SODIUM 40 MG: 40 INJECTION, POWDER, FOR SOLUTION INTRAVENOUS at 18:03

## 2024-11-13 RX ADMIN — BUDESONIDE 0.5 MG: 0.5 INHALANT ORAL at 07:29

## 2024-11-13 RX ADMIN — SENNOSIDES AND DOCUSATE SODIUM 2 TABLET: 50; 8.6 TABLET ORAL at 08:08

## 2024-11-13 RX ADMIN — ARFORMOTEROL TARTRATE 15 MCG: 15 SOLUTION RESPIRATORY (INHALATION) at 07:28

## 2024-11-13 RX ADMIN — ACETAMINOPHEN 1000 MG: 500 TABLET ORAL at 08:21

## 2024-11-13 RX ADMIN — OXYCODONE HYDROCHLORIDE 7.5 MG: 5 SOLUTION ORAL at 03:50

## 2024-11-13 RX ADMIN — HEPARIN SODIUM 5000 UNITS: 5000 INJECTION INTRAVENOUS; SUBCUTANEOUS at 06:37

## 2024-11-13 RX ADMIN — HEPARIN SODIUM 5000 UNITS: 5000 INJECTION INTRAVENOUS; SUBCUTANEOUS at 16:05

## 2024-11-13 RX ADMIN — OLANZAPINE 5 MG: 10 INJECTION, POWDER, LYOPHILIZED, FOR SOLUTION INTRAMUSCULAR at 23:05

## 2024-11-13 RX ADMIN — Medication 250 MG: at 08:08

## 2024-11-13 RX ADMIN — LIDOCAINE 1 PATCH: 4 PATCH TOPICAL at 08:09

## 2024-11-13 RX ADMIN — OXYCODONE HYDROCHLORIDE 7.5 MG: 5 SOLUTION ORAL at 22:51

## 2024-11-13 RX ADMIN — OXYCODONE HYDROCHLORIDE 7.5 MG: 5 SOLUTION ORAL at 11:42

## 2024-11-13 RX ADMIN — LORAZEPAM 1 MG: 1 TABLET ORAL at 08:07

## 2024-11-13 RX ADMIN — Medication 250 MG: at 22:51

## 2024-11-13 RX ADMIN — Medication 10 ML: at 08:11

## 2024-11-13 RX ADMIN — ARIPIPRAZOLE 2 MG: 2 TABLET ORAL at 08:08

## 2024-11-13 RX ADMIN — VANCOMYCIN HYDROCHLORIDE 500 MG: 500 INJECTION, POWDER, LYOPHILIZED, FOR SOLUTION INTRAVENOUS at 18:07

## 2024-11-13 RX ADMIN — BUDESONIDE 0.5 MG: 0.5 INHALANT ORAL at 19:14

## 2024-11-13 RX ADMIN — Medication 10 ML: at 22:53

## 2024-11-13 RX ADMIN — OXYCODONE HYDROCHLORIDE 7.5 MG: 5 SOLUTION ORAL at 16:06

## 2024-11-13 RX ADMIN — PANTOPRAZOLE SODIUM 40 MG: 40 INJECTION, POWDER, FOR SOLUTION INTRAVENOUS at 06:36

## 2024-11-13 RX ADMIN — HEPARIN SODIUM 5000 UNITS: 5000 INJECTION INTRAVENOUS; SUBCUTANEOUS at 22:51

## 2024-11-13 NOTE — PROGRESS NOTES
"Nutrition Services    Patient Name:  Nelson Wang  YOB: 1945  MRN: [id number]  Admit Date:  11/1/2024    Nutrition Follow Up    Assessment Date:  11/13/24    Encounter Information Follow up         Current Summary Pt pulled out MD lucero wants to hold off replacing cortrak, pt may need a PEG.     Current Nutrition Orders & Evaluation of Intake       Oral Nutrition     Current PO Diet Diet: Regular/House; Texture: Mechanical Ground (NDD 2); Fluid Consistency: Thin (IDDSI 0)   Supplement N/A   PO Evaluation     PO Intake % Not recorded --discussed with RN, family not available at time of visit this am    Factors Affecting Intake  decreased appetite   --  Anthropometrics          Height    Weight Height: 167 cm (65.75\")  Weight: 73 kg (161 lb) (11/13/24 0500)    BMI kg/m2 Body mass index is 26.19 kg/m².  Overweight (25 - 29.9)    Weight trend Loss 2lbs     Labs        Pertinent Labs Reviewed, listed below     Results from last 7 days   Lab Units 11/13/24  0511 11/12/24 0440 11/11/24  0539 11/10/24  0622   SODIUM mmol/L 132* 131* 133* 132*   POTASSIUM mmol/L 4.1 4.3 3.6 3.3*   CHLORIDE mmol/L 96* 98 91* 94*   CO2 mmol/L 21.0* 23.1 23.0 22.7   BUN mg/dL 62* 45* 82* 74*   CREATININE mg/dL 5.99* 4.78* 7.12* 6.65*   CALCIUM mg/dL 8.3* 8.1* 7.9* 8.3*   BILIRUBIN mg/dL  --   --   --  0.4   ALK PHOS U/L  --   --   --  70   ALT (SGPT) U/L  --   --   --  6   AST (SGOT) U/L  --   --   --  18   GLUCOSE mg/dL 137* 150* 165* 194*     Results from last 7 days   Lab Units 11/13/24  0511   PHOSPHORUS mg/dL 5.5*   HEMOGLOBIN g/dL 8.5*   HEMATOCRIT % 28.3*   WBC 10*3/mm3 5.01   ALBUMIN g/dL 3.0*     Results from last 7 days   Lab Units 11/13/24  0511 11/12/24 0440 11/11/24  0539 11/10/24  0623 11/09/24  0356   PLATELETS 10*3/mm3 171 164 165 159 149     COVID19   Date Value Ref Range Status   10/08/2024 Not Detected Not Detected - Ref. Range Final     Lab Results   Component Value Date    HGBA1C 5.90 (H) 11/09/2024 "          Medications            Scheduled Medications acetaminophen, 1,000 mg, Oral, Q8H  arformoterol, 15 mcg, Nebulization, BID - RT  ARIPiprazole, 2 mg, Oral, BID  atorvastatin, 40 mg, Oral, Daily  budesonide, 0.5 mg, Nebulization, BID - RT  Check Fentanyl Patch Placement, 1 each, Not Applicable, Q12H  heparin (porcine), 5,000 Units, Subcutaneous, Q8H  insulin regular, 2-7 Units, Subcutaneous, Q6H  [START ON 11/14/2024] levothyroxine, 175 mcg, Oral, Q AM  Lidocaine, 1 patch, Transdermal, Q24H  Lidocaine, 1 patch, Transdermal, Q24H  pantoprazole, 40 mg, Intravenous, BID AC  saccharomyces boulardii, 250 mg, Oral, BID  [START ON 11/14/2024] senna-docusate sodium, 2 tablet, Oral, Daily  sertraline, 100 mg, Oral, Nightly  sodium chloride, 10 mL, Intravenous, Q12H  torsemide, 100 mg, Oral, Daily  vancomycin, 500 mg, Intravenous, Once  Vancomycin Pharmacy Intermittent/Pulse Dosing, , Not Applicable, Daily        Infusions Pharmacy to dose vancomycin,         PRN Medications   [DISCONTINUED] senna-docusate sodium **AND** polyethylene glycol **AND** bisacodyl **AND** bisacodyl    cloNIDine    dextrose    dextrose    famotidine    glucagon (human recombinant)    heparin (porcine)    ipratropium-albuterol    labetalol    LORazepam    melatonin    nitroglycerin    OLANZapine    ondansetron    oxyCODONE    Pharmacy to dose vancomycin    simethicone    sodium chloride    sodium chloride     Physical Findings          General Appearance confused, disoriented, room air   Oral/Mouth Cavity tooth or teeth missing   Edema  not assessed   Gastrointestinal last bowel movement: 11/9   Skin  bruising   Tubes/Drains/Lines dialysis catheter   --  NUTRITION INTERVENTION / PLAN OF CARE  Intervention Goal         Intervention Goal(s) Reduce/improve symptoms, Increase intake, and Maintain weight     Nutrition Intervention         RD Action Interview for preferences, Encourage intake, Continue to monitor, and Recommend/order: oral supplement      Nutrition Prescription         Diet Prescription     Supplement Prescription Boost Plus, Magic Cup   EN/PN Prescription    New Prescription Ordered? Yes   --  Monitor/Evaluation        Monitor Per protocol, PO intake, Supplement intake, Symptoms   Discharge Needs Pending clinical course     RD to follow up per protocol.    Electronically signed by:  Elisabeth Liao RD  11/13/24 15:42 EST

## 2024-11-13 NOTE — PROGRESS NOTES
[facility]    [phone]   LOS: 12 days     Name: Nelson Wang  Age/Sex: 79 y.o. male  :  1945        PCP: Domingo Bravo MD  No chief complaint on file.     Subjective   He remains confused this morning but seems a little more lucid.  Called by nursing this morning after they found him laying on the ground with his feet up in the chair.  Evidently his daughter had been massaging his back and he got weak and laid down on the floor.  He has no injury from this fall.  Last night he was twirling his NG tube around his finger and managed to break the bridle and pulled the NG tube out.  He ate well for breakfast and family would like to hold off on replacing the tube for now.  Given cognitive status unable to assess review of systems    acetaminophen, 1,000 mg, Nasogastric, Q8H  arformoterol, 15 mcg, Nebulization, BID - RT  ARIPiprazole, 2 mg, Oral, BID  atorvastatin, 40 mg, Oral, Daily  budesonide, 0.5 mg, Nebulization, BID - RT  Check Fentanyl Patch Placement, 1 each, Not Applicable, Q12H  heparin (porcine), 5,000 Units, Subcutaneous, Q8H  insulin regular, 2-7 Units, Subcutaneous, Q6H  levothyroxine, 175 mcg, Nasogastric, Q AM  Lidocaine, 1 patch, Transdermal, Q24H  Lidocaine, 1 patch, Transdermal, Q24H  pantoprazole, 40 mg, Intravenous, BID AC  saccharomyces boulardii, 250 mg, Oral, BID  senna-docusate sodium, 2 tablet, Nasogastric, Daily  sertraline, 100 mg, Oral, Nightly  sodium chloride, 10 mL, Intravenous, Q12H  torsemide, 100 mg, Oral, Daily  vancomycin, 500 mg, Intravenous, Once  Vancomycin Pharmacy Intermittent/Pulse Dosing, , Not Applicable, Daily      Pharmacy to dose vancomycin,         Objective   Vital Signs  Temp:  [97.7 °F (36.5 °C)-98.2 °F (36.8 °C)] 97.7 °F (36.5 °C)  Heart Rate:  [] 98  Resp:  [18] 18  BP: (141-163)/(74-97) 160/87  Body mass index is 26.19 kg/m².    Intake/Output Summary (Last 24 hours) at 2024 1226  Last data filed at 2024  0230  Gross per 24 hour   Intake 380 ml   Output --   Net 380 ml       Physical Exam  Vitals reviewed.   Constitutional:       General: He is in acute distress.      Appearance: He is ill-appearing.   HENT:      Nose:      Comments: Cortrak in place  Cardiovascular:      Rate and Rhythm: Normal rate and regular rhythm.   Pulmonary:      Effort: Pulmonary effort is normal. No respiratory distress.   Abdominal:      General: Bowel sounds are normal.      Palpations: Abdomen is soft.   Skin:     General: Skin is warm and dry.   Neurological:      General: No focal deficit present.      Mental Status: He is alert. He is disoriented.           Results Review:       I reviewed the patient's new clinical results.  Results from last 7 days   Lab Units 11/13/24  0511 11/12/24  0440 11/11/24  0539 11/10/24  0623 11/09/24  0356 11/08/24  0534 11/07/24  0445   WBC 10*3/mm3 5.01 4.30 4.97 5.82 6.66 7.23 5.72   HEMOGLOBIN g/dL 8.5* 8.5* 7.4* 7.2* 8.4* 8.3* 8.0*   PLATELETS 10*3/mm3 171 164 165 159 149 142 143     Results from last 7 days   Lab Units 11/13/24  0511 11/12/24  0440 11/11/24  0539 11/10/24  0622 11/09/24  0356 11/08/24  0534 11/07/24  0631   SODIUM mmol/L 132* 131* 133* 132* 135* 132* 132*   POTASSIUM mmol/L 4.1 4.3 3.6 3.3* 3.9 3.7 3.2*   CHLORIDE mmol/L 96* 98 91* 94* 98 98 99   CO2 mmol/L 21.0* 23.1 23.0 22.7 23.0 22.0 25.3   BUN mg/dL 62* 45* 82* 74* 52* 47* 24*   CREATININE mg/dL 5.99* 4.78* 7.12* 6.65* 5.87* 5.42* 3.99*   CALCIUM mg/dL 8.3* 8.1* 7.9* 8.3* 8.2* 8.4* 8.5*   PHOSPHORUS mg/dL 5.5* 6.0* 6.2* 4.2 1.8* 1.0* 1.0*   Estimated Creatinine Clearance: 10.5 mL/min (A) (by C-G formula based on SCr of 5.99 mg/dL (H)).      Assessment & Plan   Active Hospital Problems    Diagnosis  POA    **Discitis of lumbar region [M46.46]  Yes    Severe malnutrition [E43]  Yes    Discitis [M46.40]  Yes    Metabolic encephalopathy [G93.41]  Yes    Aspiration pneumonia [J69.0]  Yes    ESRD on dialysis [N18.6, Z99.2]  Not  Applicable    Bipolar disorder [F31.9]  Yes    Chronic respiratory failure with hypoxia [J96.11]  Yes    Essential hypertension [I10]  Yes    Anemia due to chronic kidney disease [N18.9, D63.1]  Yes    Type 2 diabetes mellitus without complication [E11.9]  Yes      Resolved Hospital Problems   No resolved problems to display.       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  -He will need to be on antibiotics through December 14.  He will need weekly labs done and faxed to infectious disease.  -Repeat blood cultures were negative and would appear to have the infection under control but certainly still with issues in the spine.  -Appreciate neurology evaluation yesterday discussed with Dr. Mao last night.  Will plan aggressive delirium precautions and monitor his progress.  -Continue p.o. for now but low threshold for making n.p.o. when mentation is not appropriate  -Appreciate psychiatry's input with medications plan to continue meds with dialysis but continues to have impulsive behaviors and issues with confusion.  He does better when his family is at the bedside.  His daughter's been spending nights with him here in the hospital.  -Appreciate nephrology input and assistance remains on HD.  Plan to attempt hemodialysis again this morning hopefully able to complete.  Hold off on replacement of corTrak for now  -I have asked neurology evaluate the patient today.  Will follow-up their recommendations  -Will hold off on replacement of core track if caloric intake and protein intake is not high enough would likely move  forward with PEG placement.  -Sadly at this point I think his overall prognosis is pretty guarded.  Palliative care did meet with the patient and  his family earlier in the hospitalization.  Their goals at that time remain the same as they are now which is curative care.  They wanted to return to his baseline of the level of function.  At this point I think that she was probably stable.  After 11 days in the hospital and previous hospitalization prior to this and active infectious issues the likelihood that he returns to his prior level of function is extremely low.  He is not a great candidate for cardiopulmonary resuscitation given his advanced age end-stage renal disease anemia of chronic disease and acute and chronic issues.  He remains a full code at this time      Disposition  Expected Discharge Date: 11/15/2024; Expected Discharge Time:        Donovan Griffiths MD  [facility]  11/13/24  12:26 EST

## 2024-11-13 NOTE — PLAN OF CARE
Problem: Adult Inpatient Plan of Care  Goal: Plan of Care Review  Outcome: Progressing  Goal: Patient-Specific Goal (Individualized)  Outcome: Progressing     Problem: Comorbidity Management  Goal: Blood Glucose Level Within Target Range  Outcome: Progressing  Intervention: Monitor and Manage Glycemia  Recent Flowsheet Documentation  Taken 11/13/2024 0204 by Cosmo Christensen RN  Medication Review/Management: medications reviewed  Taken 11/13/2024 0000 by Cosmo Christensen RN  Medication Review/Management: medications reviewed  Taken 11/12/2024 2200 by Cosmo Christensen RN  Medication Review/Management: medications reviewed  Taken 11/12/2024 2020 by Cosmo Christensen RN  Medication Review/Management: medications reviewed     Problem: Skin Injury Risk Increased  Goal: Skin Health and Integrity  Outcome: Progressing  Intervention: Optimize Skin Protection  Recent Flowsheet Documentation  Taken 11/13/2024 0204 by Cosmo Christensen RN  Activity Management: activity minimized  Head of Bed (HOB) Positioning: HOB at 30 degrees  Taken 11/13/2024 0137 by Cosmo Christensen RN  Pressure Reduction Techniques:   frequent weight shift encouraged   weight shift assistance provided  Pressure Reduction Devices:   pressure-redistributing mattress utilized   positioning supports utilized  Skin Protection:   incontinence pads utilized   transparent dressing maintained  Taken 11/13/2024 0000 by Cosmo Christensen RN  Activity Management: activity minimized  Head of Bed (HOB) Positioning: HOB at 30 degrees  Taken 11/12/2024 2200 by Cosmo Christensen RN  Activity Management: activity minimized  Head of Bed (HOB) Positioning: HOB at 30 degrees  Taken 11/12/2024 2020 by Cosmo Christensen RN  Activity Management: activity encouraged  Pressure Reduction Techniques:   frequent weight shift encouraged   weight shift assistance provided   positioned off wounds  Head of Bed (HOB) Positioning: HOB at 30 degrees  Pressure Reduction Devices:    pressure-redistributing mattress utilized   positioning supports utilized  Skin Protection:   transparent dressing maintained   incontinence pads utilized     Problem: Fall Injury Risk  Goal: Absence of Fall and Fall-Related Injury  Outcome: Progressing  Intervention: Identify and Manage Contributors  Recent Flowsheet Documentation  Taken 11/13/2024 0204 by Cosmo Christensen RN  Medication Review/Management: medications reviewed  Taken 11/13/2024 0000 by Cosmo Christensen RN  Medication Review/Management: medications reviewed  Taken 11/12/2024 2200 by Cosmo Christensen RN  Medication Review/Management: medications reviewed  Taken 11/12/2024 2020 by Cosmo Christensen RN  Medication Review/Management: medications reviewed  Intervention: Promote Injury-Free Environment  Recent Flowsheet Documentation  Taken 11/13/2024 0204 by Cosmo Christensen RN  Safety Promotion/Fall Prevention:   activity supervised   assistive device/personal items within reach   clutter free environment maintained   fall prevention program maintained   nonskid shoes/slippers when out of bed   room organization consistent   safety round/check completed  Taken 11/13/2024 0000 by Cosmo Christensen RN  Safety Promotion/Fall Prevention:   activity supervised   assistive device/personal items within reach   clutter free environment maintained   fall prevention program maintained   nonskid shoes/slippers when out of bed   room organization consistent   safety round/check completed  Taken 11/12/2024 2200 by Cosmo Christensen RN  Safety Promotion/Fall Prevention:   activity supervised   assistive device/personal items within reach   clutter free environment maintained   fall prevention program maintained   nonskid shoes/slippers when out of bed   room organization consistent   safety round/check completed  Taken 11/12/2024 2020 by Cosmo Christensen RN  Safety Promotion/Fall Prevention:   activity supervised   assistive device/personal items within reach   clutter  free environment maintained   fall prevention program maintained   nonskid shoes/slippers when out of bed   room organization consistent   safety round/check completed     Problem: Violence Risk or Actual  Goal: Anger and Impulse Control  Outcome: Progressing  Intervention: Minimize Safety Risk  Recent Flowsheet Documentation  Taken 11/13/2024 0204 by Cosmo Christensen RN  Enhanced Safety Measures:   bed alarm set   family to remain at bedside   room near unit station  Taken 11/13/2024 0137 by Cosmo Christensen RN  Sensory Stimulation Regulation:   care clustered   lighting decreased   quiet environment promoted  Taken 11/13/2024 0000 by Cosmo Christensen RN  Enhanced Safety Measures:   bed alarm set   family to remain at bedside   room near unit station  Taken 11/12/2024 2200 by Cosmo Christensen RN  Enhanced Safety Measures:   bed alarm set   family to remain at bedside   room near unit station  Taken 11/12/2024 2020 by Cosmo Christensen RN  Sensory Stimulation Regulation:   care clustered   lighting decreased   quiet environment promoted   tactile stimulation minimized  Enhanced Safety Measures:   bed alarm set   family to remain at bedside   room near unit station  Intervention: Promote Self-Control  Recent Flowsheet Documentation  Taken 11/13/2024 0137 by Cosmo Christensen RN  Supportive Measures:   active listening utilized   counseling provided  Environmental Support: ([facility] baseline) calm environment promoted  Taken 11/12/2024 2020 by Cosmo Christensen RN  Supportive Measures:   active listening utilized   relaxation techniques promoted  Environmental Support:   calm environment promoted   distractions minimized   environmental consistency promoted   Goal Outcome Evaluation:

## 2024-11-13 NOTE — SIGNIFICANT NOTE
11/13/24 1441   OTHER   Discipline physical therapist   Rehab Time/Intention   Session Not Performed other (see comments)  (Patient lethargic and unable to participate with therapy this afternoon- likely medicine related per sitter. Sitter at bedside and assisted with scooting patient up in bed due to appearing uncomfortable.PT will plan to follow up tomorrow.)   Recommendation   PT - Next Appointment 11/14/24

## 2024-11-13 NOTE — PLAN OF CARE
Goal Outcome Evaluation:   Problem: Adult Inpatient Plan of Care  Goal: Plan of Care Review  Outcome: Progressing   VSS. Confused. Pt premedicated with ativan prior dialysis treatment. Dialysis RN removed 2L. RA. NSR. Anuric. Minimal PO intake throughout the day. Sitter at bedside. Bed low, alarm on, call light within reach.

## 2024-11-13 NOTE — PROGRESS NOTES
The patient is sleeping soundly during dialysis today.  Staff indicates that the predialysis dose of Ativan was very helpful

## 2024-11-13 NOTE — PLAN OF CARE
Goal Outcome Evaluation:   At approximately 0230, called to patient's room by patient's daughter who stated his cortrak was leaking. Upon assessment noted patient had pulled cortrak out and the tip of the cortrak was in the nostril. Cortrak and nasal bridle removed. Daughter verbalized wanting to see how well the patient would eat tomorrow.

## 2024-11-13 NOTE — PROGRESS NOTES
[facility] Progress Note      Patient Name: Nelson Wang  : 1945  MRN: [id number]  Primary Care Physician:  Domingo Bravo MD  Date of admission: 2024    Subjective     Interval History:   F/u ESRD     Review of Systems:   About to start HD  DHT out and ate this AM  Lethargic now    Objective     Vitals:   Temp:  [97.7 °F (36.5 °C)-98.4 °F (36.9 °C)] 98.2 °F (36.8 °C)  Heart Rate:  [] 98  Resp:  [18] 18  BP: (141-163)/(67-97) 141/97  Flow (L/min) (Oxygen Therapy):  [2] 2    Intake/Output Summary (Last 24 hours) at 2024 0807  Last data filed at 2024 0230  Gross per 24 hour   Intake 380 ml   Output --   Net 380 ml       Physical Exam:    General Appearance: frail & confused   Neck: supple, no JVD  Lungs: CTA bilat no rales  Heart: RRR, normal S1 and S2  Abdomen: soft, nontender, nondistended  Extremities: no edema, cyanosis or clubbing       Scheduled Meds:     acetaminophen, 1,000 mg, Nasogastric, Q8H  arformoterol, 15 mcg, Nebulization, BID - RT  ARIPiprazole, 2 mg, Oral, BID  atorvastatin, 40 mg, Oral, Daily  budesonide, 0.5 mg, Nebulization, BID - RT  Check Fentanyl Patch Placement, 1 each, Not Applicable, Q12H  heparin (porcine), 5,000 Units, Subcutaneous, Q8H  insulin regular, 2-7 Units, Subcutaneous, Q6H  levothyroxine, 175 mcg, Nasogastric, Q AM  Lidocaine, 1 patch, Transdermal, Q24H  Lidocaine, 1 patch, Transdermal, Q24H  midodrine, 5 mg, Oral, TID AC  pantoprazole, 40 mg, Intravenous, BID AC  saccharomyces boulardii, 250 mg, Oral, BID  senna-docusate sodium, 2 tablet, Nasogastric, Daily  sertraline, 100 mg, Oral, Nightly  sodium chloride, 10 mL, Intravenous, Q12H  torsemide, 100 mg, Oral, Daily  Vancomycin Pharmacy Intermittent/Pulse Dosing, , Not Applicable, Daily      IV Meds:   Pharmacy to dose vancomycin,         Results Reviewed:   I have personally reviewed the results from the time of this admission to 2024 08:07 EST     Results  from last 7 days   Lab Units 11/13/24  0511 11/12/24  0440 11/11/24  0539 11/10/24  0622   SODIUM mmol/L 132* 131* 133* 132*   POTASSIUM mmol/L 4.1 4.3 3.6 3.3*   CHLORIDE mmol/L 96* 98 91* 94*   CO2 mmol/L 21.0* 23.1 23.0 22.7   BUN mg/dL 62* 45* 82* 74*   CREATININE mg/dL 5.99* 4.78* 7.12* 6.65*   CALCIUM mg/dL 8.3* 8.1* 7.9* 8.3*   BILIRUBIN mg/dL  --   --   --  0.4   ALK PHOS U/L  --   --   --  70   ALT (SGPT) U/L  --   --   --  6   AST (SGOT) U/L  --   --   --  18   GLUCOSE mg/dL 137* 150* 165* 194*     Estimated Creatinine Clearance: 10.5 mL/min (A) (by C-G formula based on SCr of 5.99 mg/dL (H)).  Results from last 7 days   Lab Units 11/13/24  0511 11/12/24 0440 11/11/24  0539   PHOSPHORUS mg/dL 5.5* 6.0* 6.2*         Results from last 7 days   Lab Units 11/13/24  0511 11/12/24  0440 11/11/24  0539 11/10/24  0623 11/09/24  0356   WBC 10*3/mm3 5.01 4.30 4.97 5.82 6.66   HEMOGLOBIN g/dL 8.5* 8.5* 7.4* 7.2* 8.4*   PLATELETS 10*3/mm3 171 164 165 159 149           Assessment / Plan     ASSESSMENT:  End-stage renal disease - On home hemodialysis as an outpatient, was receiving 5 times a week (Dr Gloria suarez), via [facility] due to LUE AVF dysfunction (placed in July).  Catheter has now been removed.  HD MWF currently.  Vol excess present with enlarged effusion on CT   Anemia of chronic kidney disease on long-acting RONALD as an outpatient.  He received 1 unit of packed RBCs on 11/11.  Hgb stbale 8.5  Chrnic hypotension - BP now generous 140 s to 160s systolic on midodrine   Hypothyroidism on replacement therapy  Acute encephalopathy - neurology and psychiatry following . [employer] [employer].  Delirium suspected.  CT head noted, nothing acute   T5/6 discitis/osteomyelitis -followed by MRI with contrast 11/2 shows L1/2 & T5/6 discitis  Coag negative staph bacteremia currently on vancomycin      PLAN:  HD to commence, remove 3L as tolerated, d/w dialysis RN  Stop midodrine and watch BP   D/W daughter at bedside       Rigo  Eulalio Oliva MD  11/13/24  08:07 [facility]    [facility]  [phone]

## 2024-11-13 NOTE — PROGRESS NOTES
"[facility] Clinical Pharmacy Services: Vancomycin Monitoring Note    Nelson Wang is a 79 y.o. male who is on day 13 of pharmacy to dose vancomycin for L1/2 and T5/6 discitis.  Stop date: 12/13/24     Previous Vancomycin Dose: Intermittent pulse dosing     Updated Cultures and Sensitivities:   10/31: BCx-CoN Staph (2 of 2 bottles)  10/31: MRSA PCR-negative  11/2: BCx-negative    Results from last 7 days   Lab Units 11/13/24  0511 11/11/24  0539 11/09/24  0356   VANCOMYCIN RM mcg/mL 18.40 19.00 23.00     Vitals/Labs  Ht: 167 cm (65.75\"); Wt: 73 kg (161 lb)   Temp Readings from Last 1 Encounters:   11/12/24 98.2 °F (36.8 °C) (Oral)     Estimated Creatinine Clearance: 10.5 mL/min (A) (by C-G formula based on SCr of 5.99 mg/dL (H)).   Results from last 7 days   Lab Units 11/13/24  0511 11/12/24  0440 11/11/24  0539   CREATININE mg/dL 5.99* 4.78* 7.12*   WBC 10*3/mm3 5.01 4.30 4.97     Assessment/Plan  Dialysis planned for today. Random drug level collected with AM labs returned at 18.4 mg/L. Plan as below:     Continue vancomycin intermittent dosing with HD. Plan vancomycin 500 mg x 1 AFTER dialysis   Next Level Date and Time: TBD based on dialysis schedule   We will continue to monitor patient changes and dialysis schedule.     Thank you for involving pharmacy in this patient's care. Please contact pharmacy with any questions or concerns.       Cheyenne Gowers, [facility]  11/13/24 08:05 EST      "

## 2024-11-14 LAB
ALBUMIN SERPL-MCNC: 3.2 G/DL (ref 3.5–5.2)
ANION GAP SERPL CALCULATED.3IONS-SCNC: 9.9 MMOL/L (ref 5–15)
BASOPHILS # BLD AUTO: 0.05 10*3/MM3 (ref 0–0.2)
BASOPHILS NFR BLD AUTO: 0.8 % (ref 0–1.5)
BUN SERPL-MCNC: 29 MG/DL (ref 8–23)
BUN/CREAT SERPL: 7.3 (ref 7–25)
CALCIUM SPEC-SCNC: 9.1 MG/DL (ref 8.6–10.5)
CHLORIDE SERPL-SCNC: 96 MMOL/L (ref 98–107)
CO2 SERPL-SCNC: 23.1 MMOL/L (ref 22–29)
CREAT SERPL-MCNC: 3.98 MG/DL (ref 0.76–1.27)
DEPRECATED RDW RBC AUTO: 48.7 FL (ref 37–54)
EGFRCR SERPLBLD CKD-EPI 2021: 14.7 ML/MIN/1.73
EOSINOPHIL # BLD AUTO: 0.26 10*3/MM3 (ref 0–0.4)
EOSINOPHIL NFR BLD AUTO: 4.2 % (ref 0.3–6.2)
ERYTHROCYTE [DISTWIDTH] IN BLOOD BY AUTOMATED COUNT: 14.7 % (ref 12.3–15.4)
GLUCOSE BLDC GLUCOMTR-MCNC: 102 MG/DL (ref 70–130)
GLUCOSE BLDC GLUCOMTR-MCNC: 156 MG/DL (ref 70–130)
GLUCOSE BLDC GLUCOMTR-MCNC: 86 MG/DL (ref 70–130)
GLUCOSE BLDC GLUCOMTR-MCNC: 97 MG/DL (ref 70–130)
GLUCOSE SERPL-MCNC: 82 MG/DL (ref 65–99)
HCT VFR BLD AUTO: 27.2 % (ref 37.5–51)
HGB BLD-MCNC: 8.5 G/DL (ref 13–17.7)
IMM GRANULOCYTES # BLD AUTO: 0.05 10*3/MM3 (ref 0–0.05)
IMM GRANULOCYTES NFR BLD AUTO: 0.8 % (ref 0–0.5)
LYMPHOCYTES # BLD AUTO: 1.47 10*3/MM3 (ref 0.7–3.1)
LYMPHOCYTES NFR BLD AUTO: 23.8 % (ref 19.6–45.3)
MAGNESIUM SERPL-MCNC: 2 MG/DL (ref 1.6–2.4)
MCH RBC QN AUTO: 28.7 PG (ref 26.6–33)
MCHC RBC AUTO-ENTMCNC: 31.3 G/DL (ref 31.5–35.7)
MCV RBC AUTO: 91.9 FL (ref 79–97)
MONOCYTES # BLD AUTO: 0.95 10*3/MM3 (ref 0.1–0.9)
MONOCYTES NFR BLD AUTO: 15.4 % (ref 5–12)
NEUTROPHILS NFR BLD AUTO: 3.39 10*3/MM3 (ref 1.7–7)
NEUTROPHILS NFR BLD AUTO: 55 % (ref 42.7–76)
NRBC BLD AUTO-RTO: 0 /100 WBC (ref 0–0.2)
PHOSPHATE SERPL-MCNC: 6 MG/DL (ref 2.5–4.5)
PLATELET # BLD AUTO: 218 10*3/MM3 (ref 140–450)
PMV BLD AUTO: 10.2 FL (ref 6–12)
POTASSIUM SERPL-SCNC: 4.3 MMOL/L (ref 3.5–5.2)
RBC # BLD AUTO: 2.96 10*6/MM3 (ref 4.14–5.8)
SODIUM SERPL-SCNC: 129 MMOL/L (ref 136–145)
WBC NRBC COR # BLD AUTO: 6.17 10*3/MM3 (ref 3.4–10.8)

## 2024-11-14 PROCEDURE — 80069 RENAL FUNCTION PANEL: CPT | Performed by: INTERNAL MEDICINE

## 2024-11-14 PROCEDURE — 25010000002 HEPARIN (PORCINE) PER 1000 UNITS: Performed by: STUDENT IN AN ORGANIZED HEALTH CARE EDUCATION/TRAINING PROGRAM

## 2024-11-14 PROCEDURE — 83735 ASSAY OF MAGNESIUM: CPT | Performed by: HOSPITALIST

## 2024-11-14 PROCEDURE — 85025 COMPLETE CBC W/AUTO DIFF WBC: CPT | Performed by: INTERNAL MEDICINE

## 2024-11-14 PROCEDURE — 94799 UNLISTED PULMONARY SVC/PX: CPT

## 2024-11-14 PROCEDURE — 82948 REAGENT STRIP/BLOOD GLUCOSE: CPT

## 2024-11-14 PROCEDURE — 92526 ORAL FUNCTION THERAPY: CPT | Performed by: SPEECH-LANGUAGE PATHOLOGIST

## 2024-11-14 PROCEDURE — 25010000002 OLANZAPINE 10 MG RECONSTITUTED SOLUTION: Performed by: SPECIALIST

## 2024-11-14 RX ORDER — TORSEMIDE 100 MG/1
100 TABLET ORAL
Status: DISCONTINUED | OUTPATIENT
Start: 2024-11-14 | End: 2024-11-18 | Stop reason: HOSPADM

## 2024-11-14 RX ORDER — SEVELAMER CARBONATE 800 MG/1
800 TABLET, FILM COATED ORAL
Status: DISCONTINUED | OUTPATIENT
Start: 2024-11-14 | End: 2024-11-16

## 2024-11-14 RX ORDER — LORAZEPAM 0.5 MG/1
0.5 TABLET ORAL DAILY PRN
Status: DISPENSED | OUTPATIENT
Start: 2024-11-14 | End: 2024-11-16

## 2024-11-14 RX ADMIN — BISACODYL 5 MG: 5 TABLET, COATED ORAL at 06:22

## 2024-11-14 RX ADMIN — ARFORMOTEROL TARTRATE 15 MCG: 15 SOLUTION RESPIRATORY (INHALATION) at 19:23

## 2024-11-14 RX ADMIN — BUDESONIDE 0.5 MG: 0.5 INHALANT ORAL at 19:23

## 2024-11-14 RX ADMIN — PANTOPRAZOLE SODIUM 40 MG: 40 INJECTION, POWDER, FOR SOLUTION INTRAVENOUS at 17:32

## 2024-11-14 RX ADMIN — OLANZAPINE 5 MG: 10 INJECTION, POWDER, LYOPHILIZED, FOR SOLUTION INTRAMUSCULAR at 08:55

## 2024-11-14 RX ADMIN — SERTRALINE 100 MG: 100 TABLET, FILM COATED ORAL at 21:01

## 2024-11-14 RX ADMIN — HEPARIN SODIUM 5000 UNITS: 5000 INJECTION INTRAVENOUS; SUBCUTANEOUS at 15:26

## 2024-11-14 RX ADMIN — PANTOPRAZOLE SODIUM 40 MG: 40 INJECTION, POWDER, FOR SOLUTION INTRAVENOUS at 06:33

## 2024-11-14 RX ADMIN — HEPARIN SODIUM 5000 UNITS: 5000 INJECTION INTRAVENOUS; SUBCUTANEOUS at 21:02

## 2024-11-14 RX ADMIN — ARIPIPRAZOLE 2 MG: 2 TABLET ORAL at 21:01

## 2024-11-14 RX ADMIN — Medication 250 MG: at 21:01

## 2024-11-14 RX ADMIN — ACETAMINOPHEN 1000 MG: 500 TABLET ORAL at 17:32

## 2024-11-14 RX ADMIN — LEVOTHYROXINE SODIUM 175 MCG: 175 TABLET ORAL at 06:24

## 2024-11-14 RX ADMIN — Medication 10 ML: at 21:23

## 2024-11-14 RX ADMIN — HEPARIN SODIUM 5000 UNITS: 5000 INJECTION INTRAVENOUS; SUBCUTANEOUS at 06:22

## 2024-11-14 NOTE — PLAN OF CARE
Problem: Adult Inpatient Plan of Care  Goal: Plan of Care Review  Outcome: Progressing  Flowsheets (Taken 11/14/2024 1607)  Progress: no change  Plan of Care Reviewed With: patient   Goal Outcome Evaluation:  Plan of Care Reviewed With: patient        Progress: no change             Pt has been confused this shift. Sitter to remain at bedside. Pt is now requiring O2, 2-4L nasal cannula. Pt dialysis orders called in for tomorrow. Zyprexa given this shift. Blood pressure elevated but not high enough for PRN medications.

## 2024-11-14 NOTE — PROGRESS NOTES
[facility] Progress Note      Patient Name: Nelson Wang  : 1945  MRN: [id number]  Primary Care Physician:  Domingo Bravo MD  Date of admission: 2024    Subjective     Interval History:   F/u ESRD     Review of Systems:   Had HD yesterday, removed 2L w/o issues.   Pulled out Cortrak yesterday, not eating much  Agitated this AM    Objective     Vitals:   Temp:  [97.2 °F (36.2 °C)-97.7 °F (36.5 °C)] 97.7 °F (36.5 °C)  Heart Rate:  [] 92  Resp:  [16-18] 18  BP: (124-146)/(65-74) 146/65  Flow (L/min) (Oxygen Therapy):  [2] 2    Intake/Output Summary (Last 24 hours) at 2024 0810  Last data filed at 2024 0630  Gross per 24 hour   Intake 240 ml   Output 2000 ml   Net -1760 ml       Physical Exam:    General Appearance: frail & confused & agitated   Neck: supple, no JVD  Lungs: CTA bilat no rales  Heart: RRR, normal S1 and S2  Abdomen: soft, nontender, nondistended  Extremities: no edema, cyanosis or clubbing       Scheduled Meds:     acetaminophen, 1,000 mg, Oral, Q8H  arformoterol, 15 mcg, Nebulization, BID - RT  ARIPiprazole, 2 mg, Oral, BID  atorvastatin, 40 mg, Oral, Daily  budesonide, 0.5 mg, Nebulization, BID - RT  Check Fentanyl Patch Placement, 1 each, Not Applicable, Q12H  heparin (porcine), 5,000 Units, Subcutaneous, Q8H  insulin regular, 2-7 Units, Subcutaneous, Q6H  levothyroxine, 175 mcg, Oral, Q AM  Lidocaine, 1 patch, Transdermal, Q24H  Lidocaine, 1 patch, Transdermal, Q24H  pantoprazole, 40 mg, Intravenous, BID AC  saccharomyces boulardii, 250 mg, Oral, BID  senna-docusate sodium, 2 tablet, Oral, Daily  sertraline, 100 mg, Oral, Nightly  sodium chloride, 10 mL, Intravenous, Q12H  torsemide, 100 mg, Oral, Daily  Vancomycin Pharmacy Intermittent/Pulse Dosing, , Not Applicable, Daily      IV Meds:   Pharmacy to dose vancomycin,         Results Reviewed:   I have personally reviewed the results from the time of this admission to 2024 08:10  EST     Results from last 7 days   Lab Units 11/14/24  0556 11/13/24  0511 11/12/24 0440 11/11/24  0539 11/10/24  0622   SODIUM mmol/L 129* 132* 131*   < > 132*   POTASSIUM mmol/L 4.3 4.1 4.3   < > 3.3*   CHLORIDE mmol/L 96* 96* 98   < > 94*   CO2 mmol/L 23.1 21.0* 23.1   < > 22.7   BUN mg/dL 29* 62* 45*   < > 74*   CREATININE mg/dL 3.98* 5.99* 4.78*   < > 6.65*   CALCIUM mg/dL 9.1 8.3* 8.1*   < > 8.3*   BILIRUBIN mg/dL  --   --   --   --  0.4   ALK PHOS U/L  --   --   --   --  70   ALT (SGPT) U/L  --   --   --   --  6   AST (SGOT) U/L  --   --   --   --  18   GLUCOSE mg/dL 82 137* 150*   < > 194*    < > = values in this interval not displayed.     Estimated Creatinine Clearance: 16 mL/min (A) (by C-G formula based on SCr of 3.98 mg/dL (H)).  Results from last 7 days   Lab Units 11/14/24 0556 11/13/24 0511 11/12/24 0440   MAGNESIUM mg/dL 2.0  --   --    PHOSPHORUS mg/dL 6.0* 5.5* 6.0*         Results from last 7 days   Lab Units 11/14/24  0556 11/13/24 0511 11/12/24 0440 11/11/24  0539 11/10/24  0623   WBC 10*3/mm3 6.17 5.01 4.30 4.97 5.82   HEMOGLOBIN g/dL 8.5* 8.5* 8.5* 7.4* 7.2*   PLATELETS 10*3/mm3 218 171 164 165 159           Assessment / Plan     ASSESSMENT:  End-stage renal disease - On home hemodialysis as an outpatient, though hard to imagine would be able to resume this given degree of confusion/agitation.  Access is LUE AVF.  RIJ TDC been removed.  HD MWF currently.  Dialyzed yesterday w/o issue.  I'm not sure how much residual fcn he has but surely doesn't need torsemide daily    Anemia of chronic kidney disease, on long-acting RONALD as an outpatient.  Received 1 unit of PRBCs on 11/11, hgb stable 8.5  Chronic hypotension - BP stable off midodrine  Hypothyroidism, on replacement therapy  Acute encephalopathy - neurology and psychiatry following . Waxes & wanes.  Delirium suspected.  CT head noted, nothing acute.  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      Rigo Oliva MD  11/14/24  08:10 [facility]    [facility]  [phone]

## 2024-11-14 NOTE — PLAN OF CARE
Problem: Adult Inpatient Plan of Care  Goal: Plan of Care Review  Outcome: Progressing  Goal: Patient-Specific Goal (Individualized)  Outcome: Progressing     Problem: Comorbidity Management  Goal: Blood Glucose Level Within Target Range  Outcome: Progressing  Intervention: Monitor and Manage Glycemia  Recent Flowsheet Documentation  Taken 11/14/2024 0400 by Cosmo Christensen RN  Medication Review/Management: medications reviewed  Taken 11/14/2024 0200 by Cosmo Christensen RN  Medication Review/Management: medications reviewed  Taken 11/14/2024 0000 by Cosmo Christensen RN  Medication Review/Management: medications reviewed  Taken 11/13/2024 2200 by Cosmo Christensen RN  Medication Review/Management: medications reviewed  Taken 11/13/2024 2000 by Cosmo Christensen RN  Medication Review/Management: medications reviewed     Problem: Skin Injury Risk Increased  Goal: Skin Health and Integrity  Outcome: Progressing  Intervention: Optimize Skin Protection  Recent Flowsheet Documentation  Taken 11/14/2024 0400 by Cosmo Christensen RN  Activity Management: activity minimized  Taken 11/14/2024 0235 by Cosmo Christensen RN  Pressure Reduction Techniques:   frequent weight shift encouraged   weight shift assistance provided  Pressure Reduction Devices: pressure-redistributing mattress utilized  Skin Protection:   incontinence pads utilized   transparent dressing maintained  Taken 11/14/2024 0200 by Cosmo Christensen RN  Activity Management: activity minimized  Head of Bed (HOB) Positioning: HOB at 15 degrees  Taken 11/14/2024 0000 by Cosmo Christensen RN  Activity Management: activity minimized  Taken 11/13/2024 2200 by Cosmo Christensen RN  Activity Management: activity encouraged  Head of Bed (HOB) Positioning: HOB at 15 degrees  Taken 11/13/2024 2055 by Cosmo Christensen RN  Pressure Reduction Techniques:   frequent weight shift encouraged   weight shift assistance provided  Pressure Reduction Devices: pressure-redistributing  mattress utilized  Skin Protection:   incontinence pads utilized   transparent dressing maintained  Taken 11/13/2024 2000 by Cosmo Christensen RN  Activity Management: activity encouraged  Head of Bed (HOB) Positioning: HOB at 15 degrees     Problem: Fall Injury Risk  Goal: Absence of Fall and Fall-Related Injury  Outcome: Progressing  Intervention: Identify and Manage Contributors  Recent Flowsheet Documentation  Taken 11/14/2024 0400 by Cosmo Christensen RN  Medication Review/Management: medications reviewed  Taken 11/14/2024 0200 by Cosmo Christensen RN  Medication Review/Management: medications reviewed  Taken 11/14/2024 0000 by Cosmo Christensen RN  Medication Review/Management: medications reviewed  Taken 11/13/2024 2200 by Cosmo Christensen RN  Medication Review/Management: medications reviewed  Taken 11/13/2024 2000 by Cosmo Christensen RN  Medication Review/Management: medications reviewed  Intervention: Promote Injury-Free Environment  Recent Flowsheet Documentation  Taken 11/14/2024 0400 by Cosmo Christensen RN  Safety Promotion/Fall Prevention:   activity supervised   clutter free environment maintained   assistive device/personal items within reach   fall prevention program maintained   nonskid shoes/slippers when out of bed   room organization consistent   safety round/check completed  Taken 11/14/2024 0200 by Cosmo Christensen RN  Safety Promotion/Fall Prevention:   activity supervised   assistive device/personal items within reach   clutter free environment maintained   fall prevention program maintained   nonskid shoes/slippers when out of bed   room organization consistent   safety round/check completed  Taken 11/14/2024 0000 by Cosmo Christensen RN  Safety Promotion/Fall Prevention:   activity supervised   assistive device/personal items within reach   clutter free environment maintained   fall prevention program maintained   nonskid shoes/slippers when out of bed   room organization consistent   safety  round/check completed  Taken 11/13/2024 2200 by Cosmo Christensen RN  Safety Promotion/Fall Prevention:   activity supervised   assistive device/personal items within reach   clutter free environment maintained   fall prevention program maintained   nonskid shoes/slippers when out of bed   room organization consistent   safety round/check completed  Taken 11/13/2024 2000 by Cosmo Christensen RN  Safety Promotion/Fall Prevention:   activity supervised   assistive device/personal items within reach   clutter free environment maintained   fall prevention program maintained   nonskid shoes/slippers when out of bed   room organization consistent   safety round/check completed     Problem: Violence Risk or Actual  Goal: Anger and Impulse Control  Outcome: Progressing  Intervention: Minimize Safety Risk  Recent Flowsheet Documentation  Taken 11/14/2024 0400 by Cosmo Christensen RN  De-Escalation Techniques:   stimulation decreased   quiet time facilitated  Enhanced Safety Measures:   bed alarm set   room near unit station    at bedside  Taken 11/14/2024 0235 by Cosmo Christensen RN  Sensory Stimulation Regulation:   care clustered   lighting decreased   auditory stimulation minimized   quiet environment promoted   tactile stimulation minimized  Taken 11/14/2024 0200 by Cosmo Christensen RN  Enhanced Safety Measures:   bed alarm set   room near unit station    at bedside  Taken 11/14/2024 0000 by Cosmo Christensen RN  Enhanced Safety Measures:   bed alarm set   room near unit station  Taken 11/13/2024 2200 by Cosmo Christensen RN  Enhanced Safety Measures:   bed alarm set   room near unit station    at bedside  Taken 11/13/2024 2055 by Cosmo Christensen RN  Behavior Management: (1:1 sitter) other (see comments)  Sensory Stimulation Regulation:   care clustered   auditory stimulation minimized   visual stimulation minimized  Taken 11/13/2024 2000 by Cosmo Christensen RN  De-Escalation  Techniques:   stimulation decreased   reoriented  Enhanced Safety Measures:   bed alarm set    at bedside   room near unit station  Intervention: Promote Self-Control  Recent Flowsheet Documentation  Taken 11/14/2024 0235 by Cosmo Christensen RN  Supportive Measures:   active listening utilized   counseling provided  Environmental Support:   calm environment promoted   distractions minimized  Taken 11/13/2024 2055 by Cosmo Christensen, RN  Supportive Measures:   active listening utilized   relaxation techniques promoted  Environmental Support:   calm environment promoted   distractions minimized   Goal Outcome Evaluation:

## 2024-11-14 NOTE — SIGNIFICANT NOTE
11/14/24 1545   OTHER   Discipline physical therapist   Rehab Time/Intention   Session Not Performed unable to treat, medical status change  (Per RN, patient is not appropriate for PT today. PT will plan to follow up tomorrow as appropriate.)   Recommendation   PT - Next Appointment 11/15/24

## 2024-11-14 NOTE — PROGRESS NOTES
The patient is groggy and confused when seen today.  Cannot comply with interview to any significant degree.  No family is present but francie reports that the patient had been a bit agitated this morning.

## 2024-11-14 NOTE — PLAN OF CARE
Goal Outcome Evaluation:  Plan of Care Reviewed With: patient, child           Outcome Evaluation: Follow up regarding diet and swallow.  VFSS 11/8/24 rec soft chopped, thin liquids.  Seen 11/12 d/t concern with pt aspirating in am and downgraded to mechanical ground with thin d/t varying levels of alertness and inconsistent performance with meals. RN concerned with choking/aspirating with meals this date.  Daughter present stating pt ate a fried egg and sausage aleyda provided by daughter prior date with no aspiration.  Daughter is a hospitalist at [employer] [facility] [employer].  Pt receives Ativan to assist w/ dialysis (MWF).  Pt received Zyprexa last night and has had decline in alertness and mentation today.  Discussed current recommendation of mechanical ground diet with thin liquids when awake and alert.  Daughter aware and states she will be with patient 24 hrs for next 2-3 days.  Acknowledged need to assure pt alert w/o po.  Agrees to continue current diet and will monitor for alertness.  Feels once Zyprexa is out of his system he will swallow w/o difficulty.  Will f/u Monday for tolerance of diet and need for repeat VFSS.    Anticipated Discharge Disposition (SLP): unknown

## 2024-11-14 NOTE — PROGRESS NOTES
[facility]    [phone]   LOS: 13 days     Name: Nelson Wang  Age/Sex: 79 y.o. male  :  1945        PCP: Domingo Bravo MD  No chief complaint on file.     Subjective   Remains confused this morning.  Got Ativan with dialysis yesterday and did manage to get through dialysis but last night had a lot of difficulties.  Ended up getting IM Zyprexa at around 11 PM last night remained restless.  Given cognitive status unable to assess review of systems sleepy and a little somnolent this morning    acetaminophen, 1,000 mg, Oral, Q8H  arformoterol, 15 mcg, Nebulization, BID - RT  ARIPiprazole, 2 mg, Oral, BID  atorvastatin, 40 mg, Oral, Daily  budesonide, 0.5 mg, Nebulization, BID - RT  Check Fentanyl Patch Placement, 1 each, Not Applicable, Q12H  heparin (porcine), 5,000 Units, Subcutaneous, Q8H  insulin regular, 2-7 Units, Subcutaneous, Q6H  levothyroxine, 175 mcg, Oral, Q AM  Lidocaine, 1 patch, Transdermal, Q24H  Lidocaine, 1 patch, Transdermal, Q24H  pantoprazole, 40 mg, Intravenous, BID AC  saccharomyces boulardii, 250 mg, Oral, BID  senna-docusate sodium, 2 tablet, Oral, Daily  sertraline, 100 mg, Oral, Nightly  sodium chloride, 10 mL, Intravenous, Q12H  torsemide, 100 mg, Oral, Daily  Vancomycin Pharmacy Intermittent/Pulse Dosing, , Not Applicable, Daily      Pharmacy to dose vancomycin,         Objective   Vital Signs  Temp:  [97.2 °F (36.2 °C)-97.7 °F (36.5 °C)] 97.7 °F (36.5 °C)  Heart Rate:  [] 92  Resp:  [16-18] 18  BP: (124-160)/(65-87) 146/65  Body mass index is 26.5 kg/m².    Intake/Output Summary (Last 24 hours) at 2024 0720  Last data filed at 2024 0630  Gross per 24 hour   Intake 240 ml   Output 2000 ml   Net -1760 ml       Physical Exam  Vitals reviewed.   Constitutional:       General: He is in acute distress.      Appearance: He is ill-appearing.   Cardiovascular:      Rate and Rhythm: Normal rate and regular rhythm.   Pulmonary:      Effort:  Pulmonary effort is normal. No respiratory distress.   Abdominal:      General: Bowel sounds are normal.      Palpations: Abdomen is soft.   Skin:     General: Skin is warm and dry.   Neurological:      General: No focal deficit present.      Mental Status: He is alert. He is disoriented.           Results Review:       I reviewed the patient's new clinical results.  Results from last 7 days   Lab Units 11/14/24  0556 11/13/24  0511 11/12/24  0440 11/11/24  0539 11/10/24  0623 11/09/24  0356 11/08/24  0534   WBC 10*3/mm3 6.17 5.01 4.30 4.97 5.82 6.66 7.23   HEMOGLOBIN g/dL 8.5* 8.5* 8.5* 7.4* 7.2* 8.4* 8.3*   PLATELETS 10*3/mm3 218 171 164 165 159 149 142     Results from last 7 days   Lab Units 11/13/24  0511 11/12/24  0440 11/11/24  0539 11/10/24  0622 11/09/24  0356 11/08/24  0534   SODIUM mmol/L 132* 131* 133* 132* 135* 132*   POTASSIUM mmol/L 4.1 4.3 3.6 3.3* 3.9 3.7   CHLORIDE mmol/L 96* 98 91* 94* 98 98   CO2 mmol/L 21.0* 23.1 23.0 22.7 23.0 22.0   BUN mg/dL 62* 45* 82* 74* 52* 47*   CREATININE mg/dL 5.99* 4.78* 7.12* 6.65* 5.87* 5.42*   CALCIUM mg/dL 8.3* 8.1* 7.9* 8.3* 8.2* 8.4*   PHOSPHORUS mg/dL 5.5* 6.0* 6.2* 4.2 1.8* 1.0*   Estimated Creatinine Clearance: 10.6 mL/min (A) (by C-G formula based on SCr of 5.99 mg/dL (H)).      Assessment & Plan   Active Hospital Problems    Diagnosis  POA    **Discitis of lumbar region [M46.46]  Yes    Severe malnutrition [E43]  Yes    Discitis [M46.40]  Yes    Metabolic encephalopathy [G93.41]  Yes    Aspiration pneumonia [J69.0]  Yes    ESRD on dialysis [N18.6, Z99.2]  Not Applicable    Bipolar disorder [F31.9]  Yes    Chronic respiratory failure with hypoxia [J96.11]  Yes    Essential hypertension [I10]  Yes    Anemia due to chronic kidney disease [N18.9, D63.1]  Yes    Type 2 diabetes mellitus without complication [E11.9]  Yes      Resolved Hospital Problems   No resolved problems to display.       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  -He will need to be on antibiotics through December 14.  He will need weekly labs done and faxed to infectious disease.  -Repeat blood cultures were negative and would appear to have the infection under control but certainly still with issues in the spine.  -Appreciate neurology evaluation planning to continue with aggressive delirium precautions for now  -Clearly use of benzodiazepines and delirium does increase her risk for ongoing confusion.  But this is needed to get through dialysis.  Could be an exacerbating issue for what occurred last evening.  -Continue p.o. for now but low threshold for making n.p.o. when mentation is not appropriate  -Appreciate psychiatry's input with medications plan to continue meds with dialysis but continues to have impulsive behaviors and issues with confusion.  He does better when his family is at the bedside.  His daughter's been spending nights with him here in the hospital.  -Appreciate nephrology input and assistance remains on HD.    -Will hold off on replacement of core track if caloric intake and protein intake is not high enough would likely move forward with PEG placement.  -Sadly at this point I think his overall prognosis is pretty guarded.  Palliative care did meet with the patient and  his family earlier in the hospitalization.  Their goals at that time remain the same as they are now which is curative care.  They wanted to return to his baseline of the level of function.  At this point I think that she was probably stable.  After 11 days in the hospital and previous hospitalization prior to this  and active infectious issues the likelihood that he returns to his prior level of function is extremely low.  He is not a great candidate for cardiopulmonary resuscitation given his advanced age end-stage renal disease anemia of chronic disease and acute and chronic issues.  He remains a full code at this time      Disposition  Expected Discharge Date: 11/15/2024; Expected Discharge Time:        Donovan Griffiths MD  [facility]  11/14/24  07:20 EST

## 2024-11-14 NOTE — THERAPY TREATMENT NOTE
Acute Care - Speech Language Pathology   Swallow Treatment Note [facility]     Patient Name: Nelson Wang  : 1945  MRN: [id number]  Today's Date: 2024               Admit Date: 2024    Visit Dx:   No diagnosis found.  Patient Active Problem List   Diagnosis    Essential hypertension    Bradycardia, sinus    Anemia due to chronic kidney disease    Hypothyroidism    Idiopathic gout    Proteinuria    Psoriasis    Thrombocytopenia    Type 2 diabetes mellitus without complication    CKD (chronic kidney disease), stage IV    Hyperlipidemia    Monoclonal gammopathy of unknown significance (MGUS)    Stage 5 chronic kidney disease    Chronic gout without tophus    Peritoneal dialysis catheter dysfunction    Medicare annual wellness visit, subsequent    Chronic cough    Peritonitis associated with peritoneal dialysis    Recurrent pneumonia    Acute on chronic respiratory failure with hypoxia    End stage renal disease    Aspiration pneumonitis    Sepsis    Type 2 diabetes mellitus, with long-term current use of insulin    GERD without esophagitis    Immunosuppression due to drug therapy    Bipolar disorder    Chronic respiratory failure with hypoxia    Shortness of breath    Abnormal stress test    ESRD on dialysis    Abnormal chest CT    Encounter for screening for malignant neoplasm of colon    History of colon polyps    Family history of colon cancer    Intractable pain    Abdominal pain    ESRD (end stage renal disease) on dialysis    AMS (altered mental status)    Hallucinations    LUQ pain    Discitis    Metabolic encephalopathy    Aspiration pneumonia    Discitis of lumbar region    Severe malnutrition     Past Medical History:   Diagnosis Date    Abnormal stress test     Anemia     IRON INFUSIONS WITH DIALYSIS    Anesthesia     WITH HIP REPLACEMENT DAUGHTER BELIEVES HAD SVT     Ankle pain, right     Anxiety and depression     CKD (chronic kidney disease), stage IV     DIALYSIS  [id number] SHILEY IN RIGHT CHEST    Diabetes     Gout     High cholesterol     History of peritoneal dialysis     HL (hearing loss)     Hyperkalemia     Hypertension     Hypothyroidism     Insomnia     Night terrors     Psoriasis     Psoriasis     Sleep walking     Thrombocytopenia     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    Vitiligo      Past Surgical History:   Procedure Laterality Date    ANKLE SURGERY Right 11/1990    APPENDECTOMY N/A 1954    ARTERIOVENOUS FISTULA/SHUNT SURGERY Left 07/16/2024    Procedure: Creation of left arm arteriovenous fistula;  Surgeon: Moses Mir MD;  Location: [facility] MAIN OR;  Service: Vascular;  Laterality: Left;    BRONCHOSCOPY N/A 03/01/2024    Procedure: BRONCHOSCOPY WITH BAL AND WASHINGS;  Surgeon: Sandra Espinal MD;  Location: [facility] ENDOSCOPY;  Service: Pulmonary;  Laterality: N/A;  PNEUMONIA    BRONCHOSCOPY N/A 08/30/2024    Procedure: BRONCHOSCOPY WITH BALS AND WASHINGS;  Surgeon: Sandra Espinal MD;  Location: [facility] ENDOSCOPY;  Service: Pulmonary;  Laterality: N/A;  MUCOUS PLUGGING    CARDIAC CATHETERIZATION N/A 05/29/2024    Procedure: Left Heart Cath with possible coronary angioplasty;  Surgeon: Stephan Nichols MD;  Location: [facility] CATH INVASIVE LOCATION;  Service: Cardiology;  Laterality: N/A;    COLONOSCOPY N/A 06/2022    [facility]    ENDOSCOPY N/A 10/28/2024    Procedure: ESOPHAGOGASTRODUODENOSCOPY INSERTION OF LIGHTED INSTRUMENT TO VIEW ESOPHAGUS, STOMACH AND SMALL INTESTINE;  Surgeon: Kingsley Peraza MD;  Location: [facility] ENDOSCOPY;  Service: Gastroenterology;  Laterality: N/A;  Gastritis    HIP BIPOLAR REPLACEMENT Right 01/2000    INSERTION HEMODIALYSIS CATHETER Left 04/09/2024    Procedure: HEMODIALYSIS CATHETER INSERTION;  Surgeon: Jose Berry MD;  Location: [facility] OR;  Service: General;  Laterality: Left;    INSERTION HEMODIALYSIS CATHETER N/A 04/12/2024    Procedure: Tunneled hemodialysis catheter insertion;   Surgeon: Enrique Vinson MD;  [facility];  Service: Vascular;  Laterality: N/A;    INSERTION PERITONEAL DIALYSIS CATHETER N/A 03/27/2023    Procedure: LAPAROSCOPIC INSERTION PERITONEAL DIALYSIS CATHETER, LAPAROSCOPIC OMENTOPEXY WITH LYSIS OF ADHESIONS;  Surgeon: Jose Berry MD;  Location:  [facility] MAIN OR;  Service: General;  Laterality: N/A;    INSERTION PERITONEAL DIALYSIS CATHETER Left 07/23/2023    Procedure: REVISION OF PERITONEAL DIALYSIS CATHETER;  Surgeon: Radha Oreilly MD;  Location:  [facility] MAIN OR;  Service: General;  Laterality: Left;    REMOVAL PERITONEAL DIALYSIS CATHETER N/A 04/09/2024    Procedure: REMOVAL PERITONEAL DIALYSIS CATHETER;  Surgeon: Jose Berry MD;  Location:  [facility] MAIN OR;  Service: General;  Laterality: N/A;    RENAL BIOPSY Left 07/15/2022    UPPER GASTROINTESTINAL ENDOSCOPY      WRIST SURGERY      UNSURE WHICH SIDE DAUGHTER REPORTS HAD SEVERE  CUT FROM WINDOW AND THEY HAD TO DO RECONSTRUCTIVE SURGERY WITH VESSELS AND NERVES       SLP Recommendation and Plan                                                                               Outcome Evaluation: Follow up regarding diet and swallow.  VFSS 11/8/24 rec soft chopped, thin liquids.  Seen 11/12 d/t concern with pt aspirating in am and downgraded to mechanical ground with thin d/t varying levels of alertness and inconsistent performance with meals. RN concerned with choking/aspirating with meals this date.  Daughter present stating pt ate a fried egg and sausage aleyda provided by daughter prior date with no aspiration.  Daughter is a hospitalist at [employer].  Pt receives Ativan to assist w/ dialysis (MWF).  Pt received Zyprexa last night and has had decline in alertness and mentation today.  Discussed current recommendation of mechanical ground diet with thin liquids when awake and alert.  Daughter aware and states she will be with patient 24 hrs for next 2-3 days.   Acknowledged need to assure pt alert w/o po.  Agrees to continue current diet and will monitor for alertness.  Feels once Zyprexa is out of his system he will swallow w/o difficulty.  Will f/u Monday for tolerance of diet and need for repeat VFSS.      SWALLOW EVALUATION (Last 72 Hours)       SLP Adult Swallow Evaluation       Row Name 11/14/24 1400 11/12/24 1000                Rehab Evaluation    Document Type other (see comments)  family education adn treatment plan  -SA re-evaluation  -       Subjective Information -- no complaints  -       Patient Observations lethargic  - alert;cooperative  -       Patient/Family/Caregiver Comments/Observations -- wife present  -       Patient Effort -- good  -SA       Symptoms Noted During/After Treatment -- none  -          General Information    Patient Profile Reviewed -- yes  -SA       Pertinent History Of Current Problem -- adm w/ discitis of lumbar region; VFSS rec soft, chopped, thin; reports of choking and gurgling w/ breakfast (wife feeding pt).  -       Current Method of Nutrition -- soft to chew textures;chopped;thin liquids  -       Precautions/Limitations, Vision -- WFL;for purposes of eval  -SA       Precautions/Limitations, Hearing -- WFL;for purposes of eval  -SA       Prior Level of Function-Communication -- unknown  -       Prior Level of Function-Swallowing -- other (see comments)  VFSS 2/9/24 recommended regular and NTL. Pt was non-compliant with thickened liquids after 2 weeks.  Choking w/ breakfast this am  -       Plans/Goals Discussed with -- patient and family;agreed upon  -       Barriers to Rehab -- previous functional deficit;cognitive status  -       Patient's Goals for Discharge -- patient did not state  -       Family Goals for Discharge -- patient able to return to regular diet  -          Pain    Pretreatment Pain Rating -- 0/10 - no pain  -SA       Posttreatment Pain Rating -- 0/10 - no pain  -SA          Clinical  Swallow Eval    Oral Prep Phase -- WFL  -SA       Oral Transit -- WFL  -SA       Oral Residue -- impaired  -SA       Pharyngeal Phase -- no overt signs/symptoms of pharyngeal impairment  -SA       Esophageal Phase -- unremarkable  -SA          Oral Residue Concerns    Oral Residue Concerns -- diffuse residue throughout oral cavity  -SA       Diffuse Residue Throughout Oral Cavity -- mechanical soft  -SA          SLP Evaluation Clinical Impression    SLP Swallowing Diagnosis -- mild;oral dysphagia  -       Functional Impact -- risk of aspiration/pneumonia  -       Rehab Potential/Prognosis, Swallowing -- good, to achieve stated therapy goals  -       Swallow Criteria for Skilled Therapeutic Interventions Met -- demonstrates skilled criteria  -SA          Recommendations    Therapy Frequency (Swallow) -- PRN  -SA       Predicted Duration Therapy Intervention (Days) -- until discharge  -       SLP Diet Recommendation -- mechanical ground textures;thin liquids  -       Recommended Diagnostics -- reassess via clinical swallow evaluation  -       Recommended Precautions and Strategies -- upright posture during/after eating;small bites of food and sips of liquid;assist with feeding  -       Oral Care Recommendations -- Oral Care BID/PRN  -SA       SLP Rec. for Method of Medication Administration -- meds whole;meds crushed;with thin liquids;with puree;as tolerated  -       Monitor for Signs of Aspiration -- yes;notify SLP if any concerns  -       Anticipated Discharge Disposition (SLP) -- unknown  -SA          (STG) Patient will tolerate trials of    Consistencies Trialed (Tolerate trials) soft to chew (chopped) textures;thin liquids  -SA --       Desired Outcome (Tolerate trials) without signs/symptoms of aspiration  - --       [place] (Tolerate trials) independently (over 90% accuracy)  - --       Comment (Tolerate trials) --  Pt received Zyprexa overnight.  Daughter feels poor swallow  performance d/t this.  Will be present next 2-3 days and will ensure pt is alert for po intake.  Continue mech ground/thin and assess performance and need for repeat VFSS  -SA --                 User Key  (r) = Recorded By, (t) = Taken By, (c) = Cosigned By      Initials Name Effective Dates    Ale May SLP 01/11/24 -                     EDUCATION  The patient has been educated in the following areas:   Modified Diet Instruction.        SLP GOALS       Row Name 11/14/24 1400             (STG) Patient will tolerate trials of    Consistencies Trialed (Tolerate trials) soft to chew (chopped) textures;thin liquids  -SA      Desired Outcome (Tolerate trials) without signs/symptoms of aspiration  -SA      [place] (Tolerate trials) independently (over 90% accuracy)  -SA      Comment (Tolerate trials) --  Pt received Zyprexa overnight.  Daughter feels poor swallow performance d/t this.  Will be present next 2-3 days and will ensure pt is alert for po intake.  Continue [facility] ground/thin and assess performance and need for repeat VFSS  -SA                User Key  (r) = Recorded By, (t) = Taken By, (c) = Cosigned By      Initials Name Provider Type    Ale May SLP Speech and Language Pathologist                         Time Calculation:    Time Calculation- SLP       Row Name 11/14/24 1621 11/14/24 1618          Time Calculation- SLP    SLP Start Time 1400  -SA 1400  -SA     SLP Received On 11/14/24  -SA 11/14/24  -SA               User Key  (r) = Recorded By, (t) = Taken By, (c) = Cosigned By      Initials Name Provider Type    Ale May SLP Speech and Language Pathologist                    Therapy Charges for Today       Code Description Service Date Service Provider Modifiers Qty    [id number]  ST TREATMENT SWALLOW 3 11/14/2024 Ale Lambert SLP GN 1                 ADILSON Norris  11/14/2024

## 2024-11-15 ENCOUNTER — APPOINTMENT (OUTPATIENT)
Dept: NEUROLOGY | Facility: HOSPITAL | Age: 79
DRG: 551 | End: 2024-11-15
Payer: MEDICARE

## 2024-11-15 ENCOUNTER — APPOINTMENT (OUTPATIENT)
Dept: MRI IMAGING | Facility: HOSPITAL | Age: 79
DRG: 551 | End: 2024-11-15
Payer: MEDICARE

## 2024-11-15 LAB
ALBUMIN SERPL-MCNC: 3 G/DL (ref 3.5–5.2)
ANION GAP SERPL CALCULATED.3IONS-SCNC: 15.9 MMOL/L (ref 5–15)
BASOPHILS # BLD AUTO: 0.03 10*3/MM3 (ref 0–0.2)
BASOPHILS NFR BLD AUTO: 0.6 % (ref 0–1.5)
BUN SERPL-MCNC: 38 MG/DL (ref 8–23)
BUN/CREAT SERPL: 6.7 (ref 7–25)
CALCIUM SPEC-SCNC: 8.7 MG/DL (ref 8.6–10.5)
CHLORIDE SERPL-SCNC: 96 MMOL/L (ref 98–107)
CO2 SERPL-SCNC: 21.1 MMOL/L (ref 22–29)
CREAT SERPL-MCNC: 5.67 MG/DL (ref 0.76–1.27)
DEPRECATED RDW RBC AUTO: 48 FL (ref 37–54)
EGFRCR SERPLBLD CKD-EPI 2021: 9.6 ML/MIN/1.73
EOSINOPHIL # BLD AUTO: 0.16 10*3/MM3 (ref 0–0.4)
EOSINOPHIL NFR BLD AUTO: 3.4 % (ref 0.3–6.2)
ERYTHROCYTE [DISTWIDTH] IN BLOOD BY AUTOMATED COUNT: 14.8 % (ref 12.3–15.4)
GLUCOSE BLDC GLUCOMTR-MCNC: 115 MG/DL (ref 70–130)
GLUCOSE BLDC GLUCOMTR-MCNC: 75 MG/DL (ref 70–130)
GLUCOSE BLDC GLUCOMTR-MCNC: 94 MG/DL (ref 70–130)
GLUCOSE SERPL-MCNC: 90 MG/DL (ref 65–99)
HCT VFR BLD AUTO: 25 % (ref 37.5–51)
HGB BLD-MCNC: 7.7 G/DL (ref 13–17.7)
IMM GRANULOCYTES # BLD AUTO: 0.03 10*3/MM3 (ref 0–0.05)
IMM GRANULOCYTES NFR BLD AUTO: 0.6 % (ref 0–0.5)
LYMPHOCYTES # BLD AUTO: 1.13 10*3/MM3 (ref 0.7–3.1)
LYMPHOCYTES NFR BLD AUTO: 23.7 % (ref 19.6–45.3)
MCH RBC QN AUTO: 28.2 PG (ref 26.6–33)
MCHC RBC AUTO-ENTMCNC: 30.8 G/DL (ref 31.5–35.7)
MCV RBC AUTO: 91.6 FL (ref 79–97)
MONOCYTES # BLD AUTO: 0.67 10*3/MM3 (ref 0.1–0.9)
MONOCYTES NFR BLD AUTO: 14 % (ref 5–12)
NEUTROPHILS NFR BLD AUTO: 2.75 10*3/MM3 (ref 1.7–7)
NEUTROPHILS NFR BLD AUTO: 57.7 % (ref 42.7–76)
NRBC BLD AUTO-RTO: 0 /100 WBC (ref 0–0.2)
PHOSPHATE SERPL-MCNC: 6 MG/DL (ref 2.5–4.5)
PLATELET # BLD AUTO: 205 10*3/MM3 (ref 140–450)
PMV BLD AUTO: 10.1 FL (ref 6–12)
POTASSIUM SERPL-SCNC: 4.4 MMOL/L (ref 3.5–5.2)
QT INTERVAL: 364 MS
QTC INTERVAL: 446 MS
RBC # BLD AUTO: 2.73 10*6/MM3 (ref 4.14–5.8)
SODIUM SERPL-SCNC: 133 MMOL/L (ref 136–145)
VANCOMYCIN SERPL-MCNC: 19.8 MCG/ML (ref 5–40)
WBC NRBC COR # BLD AUTO: 4.77 10*3/MM3 (ref 3.4–10.8)

## 2024-11-15 PROCEDURE — 95816 EEG AWAKE AND DROWSY: CPT | Performed by: STUDENT IN AN ORGANIZED HEALTH CARE EDUCATION/TRAINING PROGRAM

## 2024-11-15 PROCEDURE — 97530 THERAPEUTIC ACTIVITIES: CPT

## 2024-11-15 PROCEDURE — 82948 REAGENT STRIP/BLOOD GLUCOSE: CPT

## 2024-11-15 PROCEDURE — 95816 EEG AWAKE AND DROWSY: CPT

## 2024-11-15 PROCEDURE — 94664 DEMO&/EVAL PT USE INHALER: CPT

## 2024-11-15 PROCEDURE — 93005 ELECTROCARDIOGRAM TRACING: CPT | Performed by: HOSPITALIST

## 2024-11-15 PROCEDURE — 94799 UNLISTED PULMONARY SVC/PX: CPT

## 2024-11-15 PROCEDURE — 70551 MRI BRAIN STEM W/O DYE: CPT

## 2024-11-15 PROCEDURE — 85025 COMPLETE CBC W/AUTO DIFF WBC: CPT | Performed by: INTERNAL MEDICINE

## 2024-11-15 PROCEDURE — 94761 N-INVAS EAR/PLS OXIMETRY MLT: CPT

## 2024-11-15 PROCEDURE — 93010 ELECTROCARDIOGRAM REPORT: CPT | Performed by: INTERNAL MEDICINE

## 2024-11-15 PROCEDURE — 80069 RENAL FUNCTION PANEL: CPT | Performed by: INTERNAL MEDICINE

## 2024-11-15 PROCEDURE — 80202 ASSAY OF VANCOMYCIN: CPT | Performed by: NURSE PRACTITIONER

## 2024-11-15 PROCEDURE — 25010000002 HEPARIN (PORCINE) PER 1000 UNITS: Performed by: STUDENT IN AN ORGANIZED HEALTH CARE EDUCATION/TRAINING PROGRAM

## 2024-11-15 PROCEDURE — 25010000002 VANCOMYCIN PER 500 MG: Performed by: NURSE PRACTITIONER

## 2024-11-15 RX ORDER — LORAZEPAM 1 MG/1
1 TABLET ORAL ONCE AS NEEDED
Status: COMPLETED | OUTPATIENT
Start: 2024-11-15 | End: 2024-11-15

## 2024-11-15 RX ADMIN — BUDESONIDE 0.5 MG: 0.5 INHALANT ORAL at 07:53

## 2024-11-15 RX ADMIN — HEPARIN SODIUM 5000 UNITS: 5000 INJECTION INTRAVENOUS; SUBCUTANEOUS at 05:53

## 2024-11-15 RX ADMIN — HEPARIN SODIUM 5000 UNITS: 5000 INJECTION INTRAVENOUS; SUBCUTANEOUS at 14:19

## 2024-11-15 RX ADMIN — OXYCODONE HYDROCHLORIDE 7.5 MG: 5 SOLUTION ORAL at 20:22

## 2024-11-15 RX ADMIN — ACETAMINOPHEN 1000 MG: 500 TABLET ORAL at 09:38

## 2024-11-15 RX ADMIN — Medication 250 MG: at 20:21

## 2024-11-15 RX ADMIN — SEVELAMER CARBONATE 800 MG: 800 TABLET, FILM COATED ORAL at 12:53

## 2024-11-15 RX ADMIN — PANTOPRAZOLE SODIUM 40 MG: 40 INJECTION, POWDER, FOR SOLUTION INTRAVENOUS at 06:27

## 2024-11-15 RX ADMIN — ACETAMINOPHEN 1000 MG: 500 TABLET ORAL at 01:11

## 2024-11-15 RX ADMIN — LORAZEPAM 1 MG: 1 TABLET ORAL at 21:04

## 2024-11-15 RX ADMIN — PANTOPRAZOLE SODIUM 40 MG: 40 INJECTION, POWDER, FOR SOLUTION INTRAVENOUS at 20:15

## 2024-11-15 RX ADMIN — Medication 10 ML: at 20:15

## 2024-11-15 RX ADMIN — BUDESONIDE 0.5 MG: 0.5 INHALANT ORAL at 20:42

## 2024-11-15 RX ADMIN — ARFORMOTEROL TARTRATE 15 MCG: 15 SOLUTION RESPIRATORY (INHALATION) at 20:42

## 2024-11-15 RX ADMIN — SENNOSIDES AND DOCUSATE SODIUM 2 TABLET: 50; 8.6 TABLET ORAL at 09:38

## 2024-11-15 RX ADMIN — ARFORMOTEROL TARTRATE 15 MCG: 15 SOLUTION RESPIRATORY (INHALATION) at 07:53

## 2024-11-15 RX ADMIN — VANCOMYCIN HYDROCHLORIDE 500 MG: 500 INJECTION, POWDER, LYOPHILIZED, FOR SOLUTION INTRAVENOUS at 23:39

## 2024-11-15 RX ADMIN — ATORVASTATIN CALCIUM 40 MG: 20 TABLET, FILM COATED ORAL at 09:38

## 2024-11-15 RX ADMIN — SEVELAMER CARBONATE 800 MG: 800 TABLET, FILM COATED ORAL at 09:39

## 2024-11-15 RX ADMIN — Medication 5 MG: at 20:22

## 2024-11-15 RX ADMIN — Medication 250 MG: at 09:38

## 2024-11-15 RX ADMIN — LEVOTHYROXINE SODIUM 175 MCG: 175 TABLET ORAL at 05:53

## 2024-11-15 RX ADMIN — ARIPIPRAZOLE 2 MG: 2 TABLET ORAL at 20:22

## 2024-11-15 RX ADMIN — SERTRALINE 100 MG: 100 TABLET, FILM COATED ORAL at 20:22

## 2024-11-15 RX ADMIN — BISACODYL 5 MG: 5 TABLET, COATED ORAL at 05:53

## 2024-11-15 RX ADMIN — LIDOCAINE 1 PATCH: 4 PATCH TOPICAL at 09:39

## 2024-11-15 RX ADMIN — HEPARIN SODIUM 5000 UNITS: 5000 INJECTION INTRAVENOUS; SUBCUTANEOUS at 21:05

## 2024-11-15 RX ADMIN — LORAZEPAM 0.5 MG: 0.5 TABLET ORAL at 13:26

## 2024-11-15 RX ADMIN — ARIPIPRAZOLE 2 MG: 2 TABLET ORAL at 09:39

## 2024-11-15 NOTE — NURSING NOTE
hd without incident. patient restless and swinging arms, regardless of repeated instruction and reorienting him that he had dialysis avf needles in his arm and that he was hooked up to dialysis machine. order obtained for swr by cullen arceo rn. applied per dialysis. removed 1.8 l . no meds administered. avf needles removed x 2. hemostasis achieved. stable post completion of hd, remains restless and agitated at times.

## 2024-11-15 NOTE — PROGRESS NOTES
The patient is quite groggy today after PT.  He is scheduled for an MRI later today and I have ordered that he be given lorazepam 1 mg 30 minutes prior to this procedure.

## 2024-11-15 NOTE — PLAN OF CARE
Problem: Adult Inpatient Plan of Care  Goal: Plan of Care Review  Outcome: Progressing  Goal: Patient-Specific Goal (Individualized)  Outcome: Progressing     Problem: Comorbidity Management  Goal: Blood Glucose Level Within Target Range  Outcome: Progressing  Intervention: Monitor and Manage Glycemia  Recent Flowsheet Documentation  Taken 11/15/2024 0400 by Cosmo Christensen RN  Medication Review/Management: medications reviewed  Taken 11/15/2024 0216 by Cosmo Christensen RN  Medication Review/Management: medications reviewed  Taken 11/15/2024 0000 by Cosmo Christensen RN  Medication Review/Management: medications reviewed  Taken 11/14/2024 2200 by Cosmo Christensen RN  Medication Review/Management: medications reviewed  Taken 11/14/2024 2000 by Cosmo Christensen RN  Medication Review/Management: medications reviewed     Problem: Skin Injury Risk Increased  Goal: Skin Health and Integrity  Outcome: Progressing  Intervention: Optimize Skin Protection  Recent Flowsheet Documentation  Taken 11/15/2024 0400 by Cosmo Christensen RN  Activity Management: activity minimized  Head of Bed (HOB) Positioning: HOB at 20-30 degrees  Taken 11/15/2024 0216 by Cosmo Christensen RN  Activity Management: activity minimized  Pressure Reduction Techniques:   frequent weight shift encouraged   weight shift assistance provided  Head of Bed (HOB) Positioning: HOB lowered  Skin Protection:   incontinence pads utilized   transparent dressing maintained  Taken 11/15/2024 0000 by Cosmo Christensen RN  Activity Management: activity minimized  Head of Bed (HOB) Positioning: HOB lowered  Taken 11/14/2024 2200 by Cosmo Christensen RN  Activity Management:   sitting, edge of bed   up in chair   back to bed  Taken 11/14/2024 2000 by Cosmo Christensen RN  Activity Management: activity minimized  Head of Bed (HOB) Positioning: HOB at 20 degrees  Taken 11/14/2024 1952 by Cosmo Christensen RN  Pressure Reduction Techniques:   frequent weight shift encouraged    weight shift assistance provided  Pressure Reduction Devices: alternating pressure pump (MARIANELA)  Skin Protection: incontinence pads utilized     Problem: Fall Injury Risk  Goal: Absence of Fall and Fall-Related Injury  Outcome: Progressing  Intervention: Identify and Manage Contributors  Recent Flowsheet Documentation  Taken 11/15/2024 0400 by Cosmo Christensen RN  Medication Review/Management: medications reviewed  Taken 11/15/2024 0216 by Cosmo Christensen RN  Medication Review/Management: medications reviewed  Taken 11/15/2024 0000 by Cosmo Christensen RN  Medication Review/Management: medications reviewed  Taken 11/14/2024 2200 by Cosmo Christensen RN  Medication Review/Management: medications reviewed  Taken 11/14/2024 2000 by Cosmo Christensen RN  Medication Review/Management: medications reviewed  Intervention: Promote Injury-Free Environment  Recent Flowsheet Documentation  Taken 11/15/2024 0400 by Cosmo Christensen RN  Safety Promotion/Fall Prevention:   activity supervised   assistive device/personal items within reach   clutter free environment maintained   fall prevention program maintained   nonskid shoes/slippers when out of bed   room organization consistent   safety round/check completed  Taken 11/15/2024 0216 by Cosmo Christensen RN  Safety Promotion/Fall Prevention:   activity supervised   assistive device/personal items within reach   clutter free environment maintained   fall prevention program maintained   nonskid shoes/slippers when out of bed   room organization consistent   safety round/check completed  Taken 11/15/2024 0000 by Cosmo Christensen RN  Safety Promotion/Fall Prevention:   activity supervised   assistive device/personal items within reach   clutter free environment maintained   fall prevention program maintained   nonskid shoes/slippers when out of bed   room organization consistent   safety round/check completed  Taken 11/14/2024 2200 by Cosmo Christensen RN  Safety Promotion/Fall  Prevention:   activity supervised   assistive device/personal items within reach   clutter free environment maintained   fall prevention program maintained   nonskid shoes/slippers when out of bed   room organization consistent   safety round/check completed  Taken 11/14/2024 2000 by Cosmo Christensen RN  Safety Promotion/Fall Prevention:   activity supervised   assistive device/personal items within reach   clutter free environment maintained   fall prevention program maintained   nonskid shoes/slippers when out of bed   room organization consistent   safety round/check completed     Problem: Violence Risk or Actual  Goal: Anger and Impulse Control  Outcome: Progressing  Intervention: Minimize Safety Risk  Recent Flowsheet Documentation  Taken 11/15/2024 0400 by Cosmo Christensen RN  De-Escalation Techniques:   quiet time facilitated   stimulation decreased  Enhanced Safety Measures:    at bedside   room near unit station   family to remain at bedside   bed alarm set  Taken 11/15/2024 0216 by Cosmo Christensen RN  Behavior Management: (1:1 sitter) other (see comments)  Sensory Stimulation Regulation:   auditory stimulation minimized   care clustered   quiet environment promoted   tactile stimulation minimized  De-Escalation Techniques:   quiet time facilitated   reoriented   stimulation decreased  Enhanced Safety Measures:    at bedside   room near unit station   bed alarm set   family to remain at bedside  Taken 11/15/2024 0000 by Cosmo Christensen RN  Enhanced Safety Measures:    at bedside   room near unit station   family to remain at bedside   bed alarm set  Taken 11/14/2024 2200 by Cosmo Christensen RN  Enhanced Safety Measures:  at bedside  Taken 11/14/2024 2000 by Cosmo Christensen, RN  Enhanced Safety Measures:  at bedside  Taken 11/14/2024 1952 by Cosmo Christensen, RN  Sensory Stimulation Regulation:   care clustered   lighting  decreased   quiet environment promoted  Intervention: Promote Self-Control  Recent Flowsheet Documentation  Taken 11/15/2024 0216 by Cosmo Christensen, RN  Supportive Measures:   active listening utilized   counseling provided  Environmental Support:   calm environment promoted   distractions minimized  Taken 11/14/2024 1952 by Cosmo Christensen, RN  Supportive Measures:   active listening utilized   counseling provided  Environmental Support: calm environment promoted   Goal Outcome Evaluation:

## 2024-11-15 NOTE — THERAPY TREATMENT NOTE
Patient Name: Nelson Wang  : 1945    MRN: [id number]                              Today's Date: 11/15/2024       Admit Date: 2024    Visit Dx: No diagnosis found.  Patient Active Problem List   Diagnosis    Essential hypertension    Bradycardia, sinus    Anemia due to chronic kidney disease    Hypothyroidism    Idiopathic gout    Proteinuria    Psoriasis    Thrombocytopenia    Type 2 diabetes mellitus without complication    CKD (chronic kidney disease), stage IV    Hyperlipidemia    Monoclonal gammopathy of unknown significance (MGUS)    Stage 5 chronic kidney disease    Chronic gout without tophus    Peritoneal dialysis catheter dysfunction    Medicare annual wellness visit, subsequent    Chronic cough    Peritonitis associated with peritoneal dialysis    Recurrent pneumonia    Acute on chronic respiratory failure with hypoxia    End stage renal disease    Aspiration pneumonitis    Sepsis    Type 2 diabetes mellitus, with long-term current use of insulin    GERD without esophagitis    Immunosuppression due to drug therapy    Bipolar disorder    Chronic respiratory failure with hypoxia    Shortness of breath    Abnormal stress test    ESRD on dialysis    Abnormal chest CT    Encounter for screening for malignant neoplasm of colon    History of colon polyps    Family history of colon cancer    Intractable pain    Abdominal pain    ESRD (end stage renal disease) on dialysis    AMS (altered mental status)    Hallucinations    LUQ pain    Discitis    Metabolic encephalopathy    Aspiration pneumonia    Discitis of lumbar region    Severe malnutrition     Past Medical History:   Diagnosis Date    Abnormal stress test     Anemia     IRON INFUSIONS WITH DIALYSIS    Anesthesia     WITH HIP REPLACEMENT DAUGHTER BELIEVES HAD SVT     Ankle pain, right     Anxiety and depression     CKD (chronic kidney disease), stage IV     DIALYSIS [id number] SHILEY IN RIGHT CHEST    Diabetes     Gout     High  cholesterol     History of peritoneal dialysis     HL (hearing loss)     Hyperkalemia     Hypertension     Hypothyroidism     Insomnia     Night terrors     Psoriasis     Psoriasis     Sleep walking     Thrombocytopenia     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    Vitiligo      Past Surgical History:   Procedure Laterality Date    ANKLE SURGERY Right 11/1990    APPENDECTOMY N/A 1954    ARTERIOVENOUS FISTULA/SHUNT SURGERY Left 07/16/2024    Procedure: Creation of left arm arteriovenous fistula;  Surgeon: Moses Mir MD;  Location: [facility] MAIN OR;  Service: Vascular;  Laterality: Left;    BRONCHOSCOPY N/A 03/01/2024    Procedure: BRONCHOSCOPY WITH BAL AND WASHINGS;  Surgeon: Sandra Espinal MD;  Location: [facility] ENDOSCOPY;  Service: Pulmonary;  Laterality: N/A;  PNEUMONIA    BRONCHOSCOPY N/A 08/30/2024    Procedure: BRONCHOSCOPY WITH BALS AND WASHINGS;  Surgeon: Sandra Espinal MD;  Location: [facility] ENDOSCOPY;  Service: Pulmonary;  Laterality: N/A;  MUCOUS PLUGGING    CARDIAC CATHETERIZATION N/A 05/29/2024    Procedure: Left Heart Cath with possible coronary angioplasty;  Surgeon: Stephan Nichols MD;  Location: [facility] CATH INVASIVE LOCATION;  Service: Cardiology;  Laterality: N/A;    COLONOSCOPY N/A 06/2022    [facility]    ENDOSCOPY N/A 10/28/2024    Procedure: ESOPHAGOGASTRODUODENOSCOPY INSERTION OF LIGHTED INSTRUMENT TO VIEW ESOPHAGUS, STOMACH AND SMALL INTESTINE;  Surgeon: Kingsley Peraza MD;  Location: [facility] ENDOSCOPY;  Service: Gastroenterology;  Laterality: N/A;  Gastritis    HIP BIPOLAR REPLACEMENT Right 01/2000    INSERTION HEMODIALYSIS CATHETER Left 04/09/2024    Procedure: HEMODIALYSIS CATHETER INSERTION;  Surgeon: Jose Berry MD;  Location: [facility] OR;  Service: General;  Laterality: Left;    INSERTION HEMODIALYSIS CATHETER N/A 04/12/2024    Procedure: Tunneled hemodialysis catheter insertion;  Surgeon: Enrique Vinson MD;  Location: [facility] OR;  Service: Vascular;   Laterality: N/A;    INSERTION PERITONEAL DIALYSIS CATHETER N/A 03/27/2023    Procedure: LAPAROSCOPIC INSERTION PERITONEAL DIALYSIS CATHETER, LAPAROSCOPIC OMENTOPEXY WITH LYSIS OF ADHESIONS;  Surgeon: Jose Berry MD;  Location: [facility] MAIN OR;  Service: General;  Laterality: N/A;    INSERTION PERITONEAL DIALYSIS CATHETER Left 07/23/2023    Procedure: REVISION OF PERITONEAL DIALYSIS CATHETER;  Surgeon: Radha Oreilly MD;  Location[facility];  Service: General;  Laterality: Left;    REMOVAL PERITONEAL DIALYSIS CATHETER N/A 04/09/2024    Procedure: REMOVAL PERITONEAL DIALYSIS CATHETER;  Surgeon: Jose Berry MD;  [facility];  Service: General;  Laterality: N/A;    RENAL BIOPSY Left 07/15/2022    UPPER GASTROINTESTINAL ENDOSCOPY      WRIST SURGERY      UNSURE WHICH SIDE DAUGHTER REPORTS HAD SEVERE  CUT FROM WINDOW AND THEY HAD TO DO RECONSTRUCTIVE SURGERY WITH VESSELS AND NERVES      General Information       Row Name 11/15/24 1107          Physical Therapy Time and Intention    Document Type therapy note (daily note)  -EB     Mode of Treatment physical therapy  -EB       Row Name 11/15/24 1107          General Information    Patient Profile Reviewed yes  -EB     Existing Precautions/Restrictions fall  -EB       Row Name 11/15/24 1107          Cognition    Orientation Status (Cognition) oriented to;person;place  -EB       Row Name 11/15/24 1107          Safety Issues/Impairments Affecting Functional Mobility    Impairments Affecting Function (Mobility) balance;endurance/activity tolerance;strength;pain  -EB               User Key  (r) = Recorded By, (t) = Taken By, (c) = Cosigned By      Initials Name Provider Type    EB Gema Crespo PTA Physical Therapist Assistant                   Mobility       Row Name 11/15/24 1108          Bed Mobility    Supine-Sit [place] (Bed Mobility) minimum assist (75% patient effort);verbal cues  -EB     Sit-Supine [place] (Bed  Mobility) minimum assist (75% patient effort);2 person assist;verbal cues  -EB     Assistive Device (Bed Mobility) bed rails;head of bed elevated  -EB       Row Name 11/15/24 1108          Sit-Stand Transfer    Sit-Stand [place] (Transfers) minimum assist (75% patient effort);2 person assist  -EB     Comment, (Sit-Stand Transfer) pt stood from EOB, after about 1 minute of standing,  pt c/o sharp chest pain clutching his chest and leaning forward and sat back down. HR 92, after a minute pt still c/o pain. RN called in and returned pt back to supine. HR maintaining 92%-95%  -EB               User Key  (r) = Recorded By, (t) = Taken By, (c) = Cosigned By      Initials Name Provider Type    Gema Garcia PTA Physical Therapist Assistant                   Obj/Interventions    No documentation.                  Goals/Plan    No documentation.                  Clinical Impression       Row Name 11/15/24 1112          Pain    Pain Location chest  -EB     Pain Side/Orientation left  -EB     Pre/Posttreatment Pain Comment pt c/o sharp chest pain  -EB       Row Name 11/15/24 1112          Plan of Care Review    Plan of Care Reviewed With patient  -EB     Progress no change  -EB     Outcome Evaluation Pt seen for PT tx today. Pt alert and able to follow commands. Pt was eager to get OOB. Pt required Reza with supine to sit and stood with MinAX2, HHA. Pt stood for about a minute at bedside and then c/o sharp chest pain, clutching his chest and leaning forward to sit back down EOB. HR 92.  Pt sat for about a minute and still c/o chest pain. RN notified and returned pt back to supine with MinAX2. After laying pt back down, pt reported his chest felt better. RN asked PT if pt coughed or belched and pt did neither.  Will continue to follow pt and progress pt as able.  -EB       Row Name 11/15/24 1112          Positioning and Restraints    Pre-Treatment Position in bed  -EB     Post Treatment Position bed  -EB     In Bed  supine;call light within reach;encouraged to call for assist;exit alarm on  HOB elevated, sitter present.  -EB               User Key  (r) = Recorded By, (t) = Taken By, (c) = Cosigned By      Initials Name Provider Type    Gema Garcia PTA Physical Therapist Assistant                   Outcome Measures       Row Name 11/15/24 1120          How much help from another person do you currently need...    Turning from your back to your side while in flat bed without using bedrails? 3  -EB     Moving from lying on back to sitting on the side of a flat bed without bedrails? 3  -EB     Moving to and from a bed to a chair (including a wheelchair)? 3  -EB     Standing up from a chair using your arms (e.g., wheelchair, bedside chair)? 3  -EB     Climbing 3-5 steps with a railing? 1  -EB     To walk in hospital room? 2  -EB     AM-PAC 6 Clicks Score (PT) 15  -EB     Highest Level of Mobility Goal 4 --> Transfer to chair/commode  -EB               User Key  (r) = Recorded By, (t) = Taken By, (c) = Cosigned By      Initials Name Provider Type    Gema Garcia PTA Physical Therapist Assistant                                 Physical Therapy Education       Title: PT OT SLP Therapies (Done)       Topic: Physical Therapy (Done)       Point: Mobility training (Done)       Learning Progress Summary            Patient Acceptance, E, VU by WG at 11/10/2024 1835    Acceptance, E, VU by WG at 11/9/2024 1717    Acceptance, E,D, VU,NR by EB at 11/8/2024 1016    Acceptance, E,D, VU,NR by EB at 11/7/2024 1142    Acceptance, E,D, VU,NR by EB at 11/6/2024 1042    Acceptance, E,TB, NR by MS at 11/5/2024 1538    Acceptance, E, NL,NR by KT at 11/4/2024 1144                      Point: Home exercise program (Done)       Learning Progress Summary            Patient Acceptance, E, VU by WG at 11/10/2024 1835    Acceptance, E, VU by WG at 11/9/2024 1717    Acceptance, E,D, VU,NR by EB at 11/8/2024 1016    Acceptance, E,D, VU,NR by EB at  11/7/2024 1142    Acceptance, E,D, VU,NR by EB at 11/6/2024 1042    Acceptance, E,TB, NR by MS at 11/5/2024 1538    Acceptance, E, NL,NR by KT at 11/4/2024 1144                      Point: Body mechanics (Done)       Learning Progress Summary            Patient Acceptance, E, VU by WG at 11/10/2024 1835    Acceptance, E, VU by WG at 11/9/2024 1717    Acceptance, E,D, VU,NR by EB at 11/8/2024 1016    Acceptance, E,D, VU,NR by EB at 11/7/2024 1142    Acceptance, E,D, VU,NR by EB at 11/6/2024 1042    Acceptance, E,TB, NR by MS at 11/5/2024 1538    Acceptance, E, NL,NR by KT at 11/4/2024 1144                      Point: Precautions (Done)       Learning Progress Summary            Patient Acceptance, E, VU by WG at 11/10/2024 1835    Acceptance, E, VU by WG at 11/9/2024 1717    Acceptance, E,D, VU,NR by EB at 11/8/2024 1016    Acceptance, E,D, VU,NR by EB at 11/7/2024 1142    Acceptance, E,TB, NR by MS at 11/5/2024 1538    Acceptance, E, NL,NR by KT at 11/4/2024 1144                                      User Key       Initials Effective Dates Name Provider Type Discipline    MS 06/16/21 -  Twila Puente PT Physical Therapist PT    EB 02/14/23 -  Gema Crespo PTA Physical Therapist Assistant PT    KT 09/04/24 -  Clara Ortiz RN Registered Nurse Nurse     06/12/24 -  Rustam Rush, RN Registered Nurse Nurse                  PT Recommendation and Plan     Progress: no change  Outcome Evaluation: Pt seen for PT tx today. Pt alert and able to follow commands. Pt was eager to get OOB. Pt required Reza with supine to sit and stood with MinAX2, HHA. Pt stood for about a minute at bedside and then c/o sharp chest pain, clutching his chest and leaning forward to sit back down EOB. HR 92.  Pt sat for about a minute and still c/o chest pain. RN notified and returned pt back to supine with MinAX2. After laying pt back down, pt reported his chest felt better. RN asked PT if pt coughed or belched and pt did  neither.  Will continue to follow pt and progress pt as able.     Time Calculation:         PT Charges       Row Name 11/15/24 1111             Time Calculation    Start Time 1015  -EB      Stop Time 1029  -EB      Time Calculation (min) 14 min  -EB      PT Received On 11/15/24  -EB      PT - Next Appointment 11/18/24  -EB         Time Calculation- PT    Total Timed Code Minutes- PT 14 minute(s)  -EB                User Key  (r) = Recorded By, (t) = Taken By, (c) = Cosigned By      Initials Name Provider Type    EB Gema Crespo PTA Physical Therapist Assistant                  Therapy Charges for Today       Code Description Service Date Service Provider Modifiers Qty    24421675206 HC PT THERAPEUTIC ACT EA 15 MIN 11/15/2024 Gema Crespo PTA GP 1    86720188505 HC PT THER SUPP EA 15 MIN 11/15/2024 Gema Crespo PTA GP 1            PT G-Codes  Outcome Measure Options: AM-PAC 6 Clicks Basic Mobility (PT)  AM-PAC 6 Clicks Score (PT): 15  PT Discharge Summary  Anticipated Discharge Disposition (PT): inpatient rehabilitation facility    Gema Crespo PTA  11/15/2024

## 2024-11-15 NOTE — CASE MANAGEMENT/SOCIAL WORK
Continued Stay Note  [facility]     Patient Name: Nelson Wang  MRN: [id number]  Today's Date: 11/15/2024    Admit Date: 11/1/2024    Plan: Rehab vs Home with HH   Discharge Plan       Row Name 11/15/24 0911       Plan    Plan Rehab vs Home with HH    Patient/Family in Agreement with Plan yes    Plan Comments Met with pt's daughter. She stated that they are deciding between rehab and taking the pt home with HH. [facility] and [facility] have both declined due to patient not qualifying for acute rehab. If pt goes home, they would like a commode and wheelchair from [facility] and [facility]. Referrals entered. Pt's daughter asked that [facility] re-evaluate that pt on Monday. She denied any further needs at this time. CCP following. PARAG Masterson RN                   Discharge Codes    No documentation.                 Expected Discharge Date and Time       Expected Discharge Date Expected Discharge Time    Nov 18, 2024               Rudolph Easton RN

## 2024-11-15 NOTE — PROGRESS NOTES
[facility] Progress Note      Patient Name: Nelson Wang  : 1945  MRN: [id number]  Primary Care Physician:  Domingo Bravo MD  Date of admission: 2024    Subjective     Interval History:   F/u ESRD    Review of Systems:   A little more alert this AM  Denies dyspnea  Daughter states he voids 100 to 300 cc/day (remains on diuretic)    Objective     Vitals:   Temp:  [98 °F (36.7 °C)-99.1 °F (37.3 °C)] 98.2 °F (36.8 °C)  Heart Rate:  [] 101  Resp:  [16-18] 16  BP: (123-171)/() 152/62  Flow (L/min) (Oxygen Therapy):  [2-15] 2    Intake/Output Summary (Last 24 hours) at 11/15/2024 0725  Last data filed at 11/15/2024 0600  Gross per 24 hour   Intake 330 ml   Output --   Net 330 ml       Physical Exam:    General Appearance: less confused, no distress, on RA   Neck: supple, no JVD  Lungs: CTA bilat no rales  Heart: RRR, normal S1 and S2  Abdomen: soft, nontender, nondistended  Extremities: no edema, cyanosis or clubbing       Scheduled Meds:     acetaminophen, 1,000 mg, Oral, Q8H  arformoterol, 15 mcg, Nebulization, BID - RT  ARIPiprazole, 2 mg, Oral, BID  atorvastatin, 40 mg, Oral, Daily  budesonide, 0.5 mg, Nebulization, BID - RT  Check Fentanyl Patch Placement, 1 each, Not Applicable, Q12H  heparin (porcine), 5,000 Units, Subcutaneous, Q8H  insulin regular, 2-7 Units, Subcutaneous, Q6H  levothyroxine, 175 mcg, Oral, Q AM  Lidocaine, 1 patch, Transdermal, Q24H  Lidocaine, 1 patch, Transdermal, Q24H  pantoprazole, 40 mg, Intravenous, BID AC  saccharomyces boulardii, 250 mg, Oral, BID  senna-docusate sodium, 2 tablet, Oral, Daily  sertraline, 100 mg, Oral, Nightly  sevelamer, 800 mg, Oral, TID With Meals  sodium chloride, 10 mL, Intravenous, Q12H  torsemide, 100 mg, Oral, Once per day on   Vancomycin Pharmacy Intermittent/Pulse Dosing, , Not Applicable, Daily      IV Meds:   Pharmacy to dose vancomycin,         Results Reviewed:   I  have personally reviewed the results from the time of this admission to 11/15/2024 07:25 EST     Results from last 7 days   Lab Units 11/15/24  0544 11/14/24  0556 11/13/24  0511 11/11/24  0539 11/10/24  0622   SODIUM mmol/L 133* 129* 132*   < > 132*   POTASSIUM mmol/L 4.4 4.3 4.1   < > 3.3*   CHLORIDE mmol/L 96* 96* 96*   < > 94*   CO2 mmol/L 21.1* 23.1 21.0*   < > 22.7   BUN mg/dL 38* 29* 62*   < > 74*   CREATININE mg/dL 5.67* 3.98* 5.99*   < > 6.65*   CALCIUM mg/dL 8.7 9.1 8.3*   < > 8.3*   BILIRUBIN mg/dL  --   --   --   --  0.4   ALK PHOS U/L  --   --   --   --  70   ALT (SGPT) U/L  --   --   --   --  6   AST (SGOT) U/L  --   --   --   --  18   GLUCOSE mg/dL 90 82 137*   < > 194*    < > = values in this interval not displayed.     Estimated Creatinine Clearance: 11.2 mL/min (A) (by C-G formula based on SCr of 5.67 mg/dL (H)).  Results from last 7 days   Lab Units 11/15/24  0544 11/14/24  0556 11/13/24  0511   MAGNESIUM mg/dL  --  2.0  --    PHOSPHORUS mg/dL 6.0* 6.0* 5.5*         Results from last 7 days   Lab Units 11/15/24  0544 11/14/24  0556 11/13/24  0511 11/12/24  0440 11/11/24  0539   WBC 10*3/mm3 4.77 6.17 5.01 4.30 4.97   HEMOGLOBIN g/dL 7.7* 8.5* 8.5* 8.5* 7.4*   PLATELETS 10*3/mm3 205 218 171 164 165           Assessment / Plan     ASSESSMENT:  End-stage renal disease - On home hemodialysis as an outpatient, though hard to imagine would be able to resume this given degree of confusion/agitation.  Access is LUE AVF.  RIJ TDC been removed.  HD MWF currently.  Vol stable.  Has scant residual fcn.  K 4.4.   Anemia of chronic kidney disease, on long-acting RONALD as an outpatient.  Received 1 unit of PRBCs on 11/11, hgb dropped 7.7.  No recent iron stores   Chronic hypotension - BP stable off midodrine  Hypothyroidism, on replacement therapy  Acute encephalopathy - neurology and psychiatry following . Waxes & wanes.  Delirium suspected.  CT head noted, nothing acute.  Less confused this AM  T5/6  discitis/osteomyelitis -followed by MRI with contrast 11/2 shows L1/2 & T5/6 discitis  Coag negative staph bacteremia, currently on vancomycin  MBD - phos 6.0, resume his renvela    PLAN:  HD today, remove 2L as tolerated  Check iron stores  DC torsemide   Mentation little better today  D/W daughter at bedside     Addendum: d/w Dr Griffiths, MRI brain will be non contrast so no need for intensive HD after      Rigo Oliva MD  11/15/24  07:25 [facility]    [facility]  [phone]

## 2024-11-15 NOTE — DISCHARGE PLACEMENT REQUEST
"Nelson Wang (79 y.o. Male)       Date of Birth   1945    Social Security Number       Address   [address] [place] [address] [address] [facility]    Home Phone   [phone]    MRN   [id number]       Mandaeism   Christianity    Marital Status                               Admission Date   11/1/24    Admission Type   Urgent    Admitting Provider   Fela Snyder MD    Attending Provider   Donovan Griffiths MD    Department, Room/Bed   [address], E[address]/1       Discharge Date       Discharge Disposition       Discharge Destination                                 Attending Provider: Donovan Griffiths MD    Allergies: No Known Allergies    Isolation: None   Infection: None   Code Status: CPR    Ht: 167 cm (65.75\")   Wt: 73.9 kg (162 lb 14.7 oz)    Admission Cmt: None   Principal Problem: Discitis of lumbar region [M46.46]                   Active Insurance as of 11/1/2024       Primary Coverage       Payor Plan Insurance Group Employer/Plan Group    MEDICARE MEDICARE A & B        Payor Plan Address Payor Plan Phone Number Payor Plan Fax Number Effective Dates    [address] [phone]  1/1/2011 - None Entered    [facility] [address]         Subscriber Name Subscriber Birth Date Member ID       NELSON WANG 1945 [id number]               Secondary Coverage       Payor Plan Insurance Group Employer/Plan Group    AARP MC SUP AARP HEALTH CARE OPTIONS        Payor Plan Address Payor Plan Phone Number Payor Plan Fax Number Effective Dates    [facility] [phone]  1/1/2021 - None Entered    [address]       [facility] [address]         Subscriber Name Subscriber Birth Date Member ID       NELSON WANG 1945 [id number]                     Emergency Contacts        (Rel.) Home Phone Work Phone Mobile Phone    Jose Wang (Daughter) [phone] -- [phone]    KALEIGH WANG (Spouse) -- -- [phone]                "

## 2024-11-15 NOTE — PROGRESS NOTES
Dedicated to Hospital Care    [phone]   LOS: 14 days     Name: Nelson Wang  Age/Sex: 79 y.o. male  :  1945        PCP: Domingo Bravo MD  No chief complaint on file.     Subjective   No ativan or Zyprexa since yesterday AM.  Somnolent and lethargic this AM.  Per nursing while up with PT patient complained of chest pain.  Resolved spontaneously; non verbal for me this AM  Given cognitive status unable to assess review of systems sleepy and a little somnolent this morning    acetaminophen, 1,000 mg, Oral, Q8H  arformoterol, 15 mcg, Nebulization, BID - RT  ARIPiprazole, 2 mg, Oral, BID  atorvastatin, 40 mg, Oral, Daily  budesonide, 0.5 mg, Nebulization, BID - RT  Check Fentanyl Patch Placement, 1 each, Not Applicable, Q12H  heparin (porcine), 5,000 Units, Subcutaneous, Q8H  insulin regular, 2-7 Units, Subcutaneous, Q6H  levothyroxine, 175 mcg, Oral, Q AM  Lidocaine, 1 patch, Transdermal, Q24H  Lidocaine, 1 patch, Transdermal, Q24H  pantoprazole, 40 mg, Intravenous, BID AC  saccharomyces boulardii, 250 mg, Oral, BID  senna-docusate sodium, 2 tablet, Oral, Daily  sertraline, 100 mg, Oral, Nightly  sevelamer, 800 mg, Oral, TID With Meals  torsemide, 100 mg, Oral, Once per day on   vancomycin, 500 mg, Intravenous, Once  Vancomycin Pharmacy Intermittent/Pulse Dosing, , Not Applicable, Daily      Pharmacy to dose vancomycin,         Objective   Vital Signs  Temp:  [98 °F (36.7 °C)-99.1 °F (37.3 °C)] 98 °F (36.7 °C)  Heart Rate:  [] 88  Resp:  [16-18] 16  BP: (123-171)/(58-78) 138/58  Body mass index is 26.5 kg/m².    Intake/Output Summary (Last 24 hours) at 11/15/2024 1128  Last data filed at 11/15/2024 0600  Gross per 24 hour   Intake 330 ml   Output --   Net 330 ml       Physical Exam  Vitals reviewed.   Constitutional:       General: He is in acute distress.      Appearance: He is ill-appearing.   Cardiovascular:      Rate and Rhythm: Normal rate and regular  rhythm.   Pulmonary:      Effort: Pulmonary effort is normal. No respiratory distress.   Abdominal:      General: Bowel sounds are normal.      Palpations: Abdomen is soft.   Skin:     General: Skin is warm and dry.   Neurological:      General: No focal deficit present.      Mental Status: He is alert. He is disoriented.           Results Review:       I reviewed the patient's new clinical results.  Results from last 7 days   Lab Units 11/15/24  0544 11/14/24  0556 11/13/24  0511 11/12/24  0440 11/11/24  0539 11/10/24  0623 11/09/24  0356   WBC 10*3/mm3 4.77 6.17 5.01 4.30 4.97 5.82 6.66   HEMOGLOBIN g/dL 7.7* 8.5* 8.5* 8.5* 7.4* 7.2* 8.4*   PLATELETS 10*3/mm3 205 218 171 164 165 159 149     Results from last 7 days   Lab Units 11/15/24  0544 11/14/24  0556 11/13/24 0511 11/12/24  0440 11/11/24 0539 11/10/24  0622 11/09/24  0356   SODIUM mmol/L 133* 129* 132* 131* 133* 132* 135*   POTASSIUM mmol/L 4.4 4.3 4.1 4.3 3.6 3.3* 3.9   CHLORIDE mmol/L 96* 96* 96* 98 91* 94* 98   CO2 mmol/L 21.1* 23.1 21.0* 23.1 23.0 22.7 23.0   BUN mg/dL 38* 29* 62* 45* 82* 74* 52*   CREATININE mg/dL 5.67* 3.98* 5.99* 4.78* 7.12* 6.65* 5.87*   CALCIUM mg/dL 8.7 9.1 8.3* 8.1* 7.9* 8.3* 8.2*   MAGNESIUM mg/dL  --  2.0  --   --   --   --   --    PHOSPHORUS mg/dL 6.0* 6.0* 5.5* 6.0* 6.2* 4.2 1.8*   Estimated Creatinine Clearance: 11.2 mL/min (A) (by C-G formula based on SCr of 5.67 mg/dL (H)).      Assessment & Plan   Active Hospital Problems    Diagnosis  POA    **Discitis of lumbar region [M46.46]  Yes    Severe malnutrition [E43]  Yes    Discitis [M46.40]  Yes    Metabolic encephalopathy [G93.41]  Yes    Aspiration pneumonia [J69.0]  Yes    ESRD on dialysis [N18.6, Z99.2]  Not Applicable    Bipolar disorder [F31.9]  Yes    Chronic respiratory failure with hypoxia [J96.11]  Yes    Essential hypertension [I10]  Yes    Anemia due to chronic kidney disease [N18.9, D63.1]  Yes    Type 2 diabetes mellitus without complication [E11.9]  Yes       Resolved Hospital Problems   No resolved problems to display.       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  -He will need to be on antibiotics through December 14.  He will need weekly labs done and faxed to infectious disease.  -Repeat blood cultures were negative and would appear to have the infection under control but certainly still with issues in the spine.  -Appreciate neurology evaluation planning to continue with aggressive delirium precautions for now; no cognitive improvement at this time will check MR and EEG  -Clearly use of benzodiazepines and delirium does increase her risk for ongoing confusion.  But this is needed to get through dialysis.  Could be an exacerbating issue for what occurred last evening.  -Continue p.o. for now but low threshold for making n.p.o. when mentation is not appropriate  -Appreciate psychiatry's input with medications plan to continue meds with dialysis but continues to have impulsive behaviors and issues with confusion.  He does better when his family is at the bedside.  His daughter's been spending nights with him here in the hospital.  -Appreciate nephrology input and assistance remains on HD.    -Will hold off on replacement of core track if caloric intake and protein intake is not high enough would likely move forward with PEG placement.  -Sadly at this point I think his overall prognosis is pretty guarded.  Palliative care did meet with the patient and  his family earlier in the hospitalization.  Their goals at that time remain the same as they are now which is curative  care.  They wanted to return to his baseline of the level of function.  At this point I think that she was probably stable.  After 11 days in the hospital and previous hospitalization prior to this and active infectious issues the likelihood that he returns to his prior level of function is extremely low.  He is not a great candidate for cardiopulmonary resuscitation given his advanced age end-stage renal disease anemia of chronic disease and acute and chronic issues.  He remains a full code at this time      Disposition  Expected Discharge Date: 11/18/2024; Expected Discharge Time:        Donovan Griffiths MD  [facility]  11/15/24  11:28 EST

## 2024-11-15 NOTE — PROGRESS NOTES
"[facility] Clinical Pharmacy Services: Vancomycin Monitoring Note    Nelson Wang is a 79 y.o. male who is on day 15 of pharmacy to dose vancomycin for L1/2 and T5/6 discitis.  Stop date: 12/13/24     Previous Vancomycin Dose: Intermittent pulse dosing     Updated Cultures and Sensitivities:   10/31: BCx-CoN Staph (2 of 2 bottles)  10/31: MRSA PCR-negative  11/2: BCx-negative    Results from last 7 days   Lab Units 11/15/24  0544 11/13/24  0511 11/11/24  0539   VANCOMYCIN RM mcg/mL 19.80 18.40 19.00     Vitals/Labs  Ht: 167 cm (65.75\"); Wt: 73.9 kg (162 lb 14.7 oz)   Temp Readings from Last 1 Encounters:   11/15/24 98.2 °F (36.8 °C) (Oral)     Estimated Creatinine Clearance: 11.2 mL/min (A) (by C-G formula based on SCr of 5.67 mg/dL (H)).   Results from last 7 days   Lab Units 11/15/24  0544 11/14/24  0556 11/13/24  0511   CREATININE mg/dL 5.67* 3.98* 5.99*   WBC 10*3/mm3 4.77 6.17 5.01     Assessment/Plan  Dialysis planned for today. Random level collected with AM labs returned at 19.8 mg/L.     Continue vancomycin intermittent dosing with HD. Plan for vancomycin 500 mg x 1 after dialysis    Next Level Date and Time: Monday with AM labs   We will continue to monitor patient changes and dialysis schedule.     Thank you for involving pharmacy in this patient's care. Please contact pharmacy with any questions or concerns.       Cheyenne Gowers, [facility]  11/15/24 08:37 EST    "

## 2024-11-15 NOTE — PLAN OF CARE
Goal Outcome Evaluation:  Plan of Care Reviewed With: patient        Progress: no change  Outcome Evaluation: Pt seen for PT tx today. Pt alert and able to follow commands. Pt was eager to get OOB. Pt required Reza with supine to sit and stood with MinAX2, HHA. Pt stood for about a minute at bedside and then c/o sharp chest pain, clutching his chest and leaning forward to sit back down EOB. HR 92.  Pt sat for about a minute and still c/o chest pain. RN notified and returned pt back to supine with MinAX2. After laying pt back down, pt reported his chest felt better. RN asked PT if pt coughed or belched and pt did neither.  Will continue to follow pt and progress pt as able.    Anticipated Discharge Disposition (PT): inpatient rehabilitation facility

## 2024-11-15 NOTE — PROGRESS NOTES
Nutrition Services    Patient Name:  Nelson Wang  YOB: 1945  MRN: [id number]  Admit Date:  11/1/2024  Nutrition Services        PROGRESS NOTE      Encounter Information: Follow up note       PO Diet: Diet: Regular/House; Texture: Mechanical Ground (NDD 2); Fluid Consistency: Thin (IDDSI 0)   PO Supplements: Boost, magic cups   PO Intake:  Ate 25% of breakfast this am       Nutrition support orders: --   Nutrition support review: --       Labs (reviewed below): Na 133, BUN 38, cr 5.67, phos 6.0, Hgb/Hct       GI Function:  BM 11/9       Nutrition Intervention: Will continue to monitor and encourage good po intake with meals. Supplements offered to supplements po intake.   RD to follow       Results from last 7 days   Lab Units 11/15/24  0544 11/14/24  0556 11/13/24  0511 11/11/24  0539 11/10/24  0622   SODIUM mmol/L 133* 129* 132*   < > 132*   POTASSIUM mmol/L 4.4 4.3 4.1   < > 3.3*   CHLORIDE mmol/L 96* 96* 96*   < > 94*   CO2 mmol/L 21.1* 23.1 21.0*   < > 22.7   BUN mg/dL 38* 29* 62*   < > 74*   CREATININE mg/dL 5.67* 3.98* 5.99*   < > 6.65*   CALCIUM mg/dL 8.7 9.1 8.3*   < > 8.3*   BILIRUBIN mg/dL  --   --   --   --  0.4   ALK PHOS U/L  --   --   --   --  70   ALT (SGPT) U/L  --   --   --   --  6   AST (SGOT) U/L  --   --   --   --  18   GLUCOSE mg/dL 90 82 137*   < > 194*    < > = values in this interval not displayed.     Results from last 7 days   Lab Units 11/15/24  0544 11/14/24  0556   MAGNESIUM mg/dL  --  2.0   PHOSPHORUS mg/dL 6.0* 6.0*   HEMOGLOBIN g/dL 7.7* 8.5*   HEMATOCRIT % 25.0* 27.2*     COVID19   Date Value Ref Range Status   10/08/2024 Not Detected Not Detected - Ref. Range Final     Lab Results   Component Value Date    HGBA1C 5.90 (H) 11/09/2024       RD to follow up per protocol.    Electronically signed by:  Elisabeth Liao RD  11/15/24 15:13 EST

## 2024-11-15 NOTE — PLAN OF CARE
Goal Outcome Evaluation:  Plan of Care Reviewed With: patient        Progress: no change  Outcome Evaluation: SR, 2.5L nc, A/Ox2, disoriented to place and situation. EEG and MRI Brain ordered per MD orders. Bilateral upper wrist restraints applied r/t agitation/restless. Discharge planning to be evaluated pending medical clearance.

## 2024-11-16 LAB
ALBUMIN SERPL-MCNC: 2.9 G/DL (ref 3.5–5.2)
ANION GAP SERPL CALCULATED.3IONS-SCNC: 11.9 MMOL/L (ref 5–15)
BASOPHILS # BLD AUTO: 0.04 10*3/MM3 (ref 0–0.2)
BASOPHILS NFR BLD AUTO: 0.7 % (ref 0–1.5)
BUN SERPL-MCNC: 17 MG/DL (ref 8–23)
BUN/CREAT SERPL: 4.8 (ref 7–25)
CALCIUM SPEC-SCNC: 8.9 MG/DL (ref 8.6–10.5)
CHLORIDE SERPL-SCNC: 97 MMOL/L (ref 98–107)
CO2 SERPL-SCNC: 26.1 MMOL/L (ref 22–29)
CREAT SERPL-MCNC: 3.57 MG/DL (ref 0.76–1.27)
DEPRECATED RDW RBC AUTO: 49.5 FL (ref 37–54)
EGFRCR SERPLBLD CKD-EPI 2021: 16.7 ML/MIN/1.73
EOSINOPHIL # BLD AUTO: 0.19 10*3/MM3 (ref 0–0.4)
EOSINOPHIL NFR BLD AUTO: 3.5 % (ref 0.3–6.2)
ERYTHROCYTE [DISTWIDTH] IN BLOOD BY AUTOMATED COUNT: 14.8 % (ref 12.3–15.4)
FERRITIN SERPL-MCNC: 1821 NG/ML (ref 30–400)
GLUCOSE BLDC GLUCOMTR-MCNC: 104 MG/DL (ref 70–130)
GLUCOSE BLDC GLUCOMTR-MCNC: 116 MG/DL (ref 70–130)
GLUCOSE BLDC GLUCOMTR-MCNC: 75 MG/DL (ref 70–130)
GLUCOSE BLDC GLUCOMTR-MCNC: 80 MG/DL (ref 70–130)
GLUCOSE SERPL-MCNC: 79 MG/DL (ref 65–99)
HCT VFR BLD AUTO: 27.9 % (ref 37.5–51)
HGB BLD-MCNC: 8.2 G/DL (ref 13–17.7)
IMM GRANULOCYTES # BLD AUTO: 0.03 10*3/MM3 (ref 0–0.05)
IMM GRANULOCYTES NFR BLD AUTO: 0.6 % (ref 0–0.5)
IRON 24H UR-MRATE: 50 MCG/DL (ref 59–158)
IRON SATN MFR SERPL: 22 % (ref 20–50)
LYMPHOCYTES # BLD AUTO: 1.27 10*3/MM3 (ref 0.7–3.1)
LYMPHOCYTES NFR BLD AUTO: 23.3 % (ref 19.6–45.3)
MCH RBC QN AUTO: 27.5 PG (ref 26.6–33)
MCHC RBC AUTO-ENTMCNC: 29.4 G/DL (ref 31.5–35.7)
MCV RBC AUTO: 93.6 FL (ref 79–97)
MONOCYTES # BLD AUTO: 0.74 10*3/MM3 (ref 0.1–0.9)
MONOCYTES NFR BLD AUTO: 13.6 % (ref 5–12)
NEUTROPHILS NFR BLD AUTO: 3.18 10*3/MM3 (ref 1.7–7)
NEUTROPHILS NFR BLD AUTO: 58.3 % (ref 42.7–76)
NRBC BLD AUTO-RTO: 0 /100 WBC (ref 0–0.2)
PHOSPHATE SERPL-MCNC: 4 MG/DL (ref 2.5–4.5)
PLATELET # BLD AUTO: 235 10*3/MM3 (ref 140–450)
PMV BLD AUTO: 10 FL (ref 6–12)
POTASSIUM SERPL-SCNC: 3.9 MMOL/L (ref 3.5–5.2)
RBC # BLD AUTO: 2.98 10*6/MM3 (ref 4.14–5.8)
SODIUM SERPL-SCNC: 135 MMOL/L (ref 136–145)
TIBC SERPL-MCNC: 228 MCG/DL (ref 298–536)
TRANSFERRIN SERPL-MCNC: 153 MG/DL (ref 200–360)
WBC NRBC COR # BLD AUTO: 5.45 10*3/MM3 (ref 3.4–10.8)

## 2024-11-16 PROCEDURE — 83540 ASSAY OF IRON: CPT | Performed by: INTERNAL MEDICINE

## 2024-11-16 PROCEDURE — 82948 REAGENT STRIP/BLOOD GLUCOSE: CPT

## 2024-11-16 PROCEDURE — 82728 ASSAY OF FERRITIN: CPT | Performed by: INTERNAL MEDICINE

## 2024-11-16 PROCEDURE — 94799 UNLISTED PULMONARY SVC/PX: CPT

## 2024-11-16 PROCEDURE — 80069 RENAL FUNCTION PANEL: CPT | Performed by: INTERNAL MEDICINE

## 2024-11-16 PROCEDURE — 25010000002 HEPARIN (PORCINE) PER 1000 UNITS: Performed by: STUDENT IN AN ORGANIZED HEALTH CARE EDUCATION/TRAINING PROGRAM

## 2024-11-16 PROCEDURE — 84466 ASSAY OF TRANSFERRIN: CPT | Performed by: INTERNAL MEDICINE

## 2024-11-16 PROCEDURE — 85025 COMPLETE CBC W/AUTO DIFF WBC: CPT | Performed by: INTERNAL MEDICINE

## 2024-11-16 PROCEDURE — 94664 DEMO&/EVAL PT USE INHALER: CPT

## 2024-11-16 PROCEDURE — 25010000002 OLANZAPINE 10 MG RECONSTITUTED SOLUTION: Performed by: SPECIALIST

## 2024-11-16 RX ORDER — SEVELAMER CARBONATE FOR ORAL SUSPENSION 800 MG/1
800 POWDER, FOR SUSPENSION ORAL
Status: DISCONTINUED | OUTPATIENT
Start: 2024-11-16 | End: 2024-11-18 | Stop reason: HOSPADM

## 2024-11-16 RX ORDER — OXYCODONE HCL 5 MG/5 ML
5 SOLUTION, ORAL ORAL EVERY 4 HOURS PRN
Status: DISCONTINUED | OUTPATIENT
Start: 2024-11-16 | End: 2024-11-18 | Stop reason: HOSPADM

## 2024-11-16 RX ADMIN — ACETAMINOPHEN 1000 MG: 500 TABLET ORAL at 17:32

## 2024-11-16 RX ADMIN — LEVOTHYROXINE SODIUM 175 MCG: 175 TABLET ORAL at 06:23

## 2024-11-16 RX ADMIN — PANTOPRAZOLE SODIUM 40 MG: 40 INJECTION, POWDER, FOR SOLUTION INTRAVENOUS at 08:57

## 2024-11-16 RX ADMIN — OXYCODONE HYDROCHLORIDE 5 MG: 5 SOLUTION ORAL at 21:33

## 2024-11-16 RX ADMIN — ARFORMOTEROL TARTRATE 15 MCG: 15 SOLUTION RESPIRATORY (INHALATION) at 20:43

## 2024-11-16 RX ADMIN — ACETAMINOPHEN 1000 MG: 500 TABLET ORAL at 08:57

## 2024-11-16 RX ADMIN — ARFORMOTEROL TARTRATE 15 MCG: 15 SOLUTION RESPIRATORY (INHALATION) at 09:19

## 2024-11-16 RX ADMIN — OXYCODONE HYDROCHLORIDE 7.5 MG: 5 SOLUTION ORAL at 11:59

## 2024-11-16 RX ADMIN — BISACODYL 5 MG: 5 TABLET, COATED ORAL at 08:57

## 2024-11-16 RX ADMIN — HEPARIN SODIUM 5000 UNITS: 5000 INJECTION INTRAVENOUS; SUBCUTANEOUS at 06:11

## 2024-11-16 RX ADMIN — HEPARIN SODIUM 5000 UNITS: 5000 INJECTION INTRAVENOUS; SUBCUTANEOUS at 15:04

## 2024-11-16 RX ADMIN — SEVELAMER CARBONATE 0.8 G: 800 FOR SUSPENSION ORAL at 17:32

## 2024-11-16 RX ADMIN — SERTRALINE 100 MG: 100 TABLET, FILM COATED ORAL at 21:33

## 2024-11-16 RX ADMIN — PANTOPRAZOLE SODIUM 40 MG: 40 INJECTION, POWDER, FOR SOLUTION INTRAVENOUS at 16:31

## 2024-11-16 RX ADMIN — OXYCODONE HYDROCHLORIDE 5 MG: 5 SOLUTION ORAL at 16:31

## 2024-11-16 RX ADMIN — Medication 250 MG: at 08:57

## 2024-11-16 RX ADMIN — BUDESONIDE 0.5 MG: 0.5 INHALANT ORAL at 09:20

## 2024-11-16 RX ADMIN — SEVELAMER CARBONATE 800 MG: 800 TABLET, FILM COATED ORAL at 11:59

## 2024-11-16 RX ADMIN — BUDESONIDE 0.5 MG: 0.5 INHALANT ORAL at 20:43

## 2024-11-16 RX ADMIN — LIDOCAINE 1 PATCH: 4 PATCH TOPICAL at 08:57

## 2024-11-16 RX ADMIN — OXYCODONE HYDROCHLORIDE 7.5 MG: 5 SOLUTION ORAL at 06:21

## 2024-11-16 RX ADMIN — TORSEMIDE 100 MG: 100 TABLET ORAL at 11:59

## 2024-11-16 RX ADMIN — ARIPIPRAZOLE 2 MG: 2 TABLET ORAL at 08:57

## 2024-11-16 RX ADMIN — OLANZAPINE 5 MG: 10 INJECTION, POWDER, LYOPHILIZED, FOR SOLUTION INTRAMUSCULAR at 11:09

## 2024-11-16 RX ADMIN — SENNOSIDES AND DOCUSATE SODIUM 2 TABLET: 50; 8.6 TABLET ORAL at 08:57

## 2024-11-16 RX ADMIN — ARIPIPRAZOLE 2 MG: 2 TABLET ORAL at 21:34

## 2024-11-16 RX ADMIN — LIDOCAINE 1 PATCH: 4 PATCH TOPICAL at 08:58

## 2024-11-16 RX ADMIN — SEVELAMER CARBONATE 800 MG: 800 TABLET, FILM COATED ORAL at 08:57

## 2024-11-16 RX ADMIN — ATORVASTATIN CALCIUM 40 MG: 20 TABLET, FILM COATED ORAL at 08:57

## 2024-11-16 NOTE — PROGRESS NOTES
[facility] Progress Note      Patient Name: Nelson Wang  : 1945  MRN: [id number]  Primary Care Physician:  Domingo Bravo MD  Date of admission: 2024    Subjective     Interval History:   F/u ESRD  The patient had dialysis yesterday, he is very confused today he appears to be awake but not answering questions and appears to be restless  Review of Systems:   Not obtainable    Objective     Vitals:   Temp:  [97.3 °F (36.3 °C)-98.2 °F (36.8 °C)] 98.2 °F (36.8 °C)  Heart Rate:  [83-96] 86  Resp:  [14-20] 20  BP: (130-182)/() 158/63  Flow (L/min) (Oxygen Therapy):  [2-3] 2    Intake/Output Summary (Last 24 hours) at 2024 1033  Last data filed at 2024 0620  Gross per 24 hour   Intake 590 ml   Output 1800 ml   Net -1210 ml       Physical Exam:    General Appearance: Awake, disoriented and confused not answering question, chronically ill, no acute  Neck: No JVD  Lungs: Bilateral rhonchi, breathing effort not clear  Heart: RRR, no rub  Abdomen: soft, nontender, nondistended  Extremities: no edema, cyanosis or clubbing, he has functional AV fistula in the left upper arm with good thrill and bruit       Scheduled Meds:     acetaminophen, 1,000 mg, Oral, Q8H  arformoterol, 15 mcg, Nebulization, BID - RT  ARIPiprazole, 2 mg, Oral, BID  atorvastatin, 40 mg, Oral, Daily  budesonide, 0.5 mg, Nebulization, BID - RT  Check Fentanyl Patch Placement, 1 each, Not Applicable, Q12H  heparin (porcine), 5,000 Units, Subcutaneous, Q8H  insulin regular, 2-7 Units, Subcutaneous, 4x Daily AC & at Bedtime  levothyroxine, 175 mcg, Oral, Q AM  Lidocaine, 1 patch, Transdermal, Q24H  Lidocaine, 1 patch, Transdermal, Q24H  pantoprazole, 40 mg, Intravenous, BID AC  saccharomyces boulardii, 250 mg, Oral, BID  senna-docusate sodium, 2 tablet, Oral, Daily  sertraline, 100 mg, Oral, Nightly  sevelamer, 800 mg, Oral, TID With Meals  torsemide, 100 mg, Oral, Once per day on   Thursday Saturday  Vancomycin Pharmacy Intermittent/Pulse Dosing, , Not Applicable, Daily      IV Meds:   Pharmacy to dose vancomycin,         Results Reviewed:   I have personally reviewed the results from the time of this admission to 11/16/2024 10:33 EST     Results from last 7 days   Lab Units 11/16/24  0431 11/15/24  0544 11/14/24  0556 11/11/24  0539 11/10/24  0622   SODIUM mmol/L 135* 133* 129*   < > 132*   POTASSIUM mmol/L 3.9 4.4 4.3   < > 3.3*   CHLORIDE mmol/L 97* 96* 96*   < > 94*   CO2 mmol/L 26.1 21.1* 23.1   < > 22.7   BUN mg/dL 17 38* 29*   < > 74*   CREATININE mg/dL 3.57* 5.67* 3.98*   < > 6.65*   CALCIUM mg/dL 8.9 8.7 9.1   < > 8.3*   BILIRUBIN mg/dL  --   --   --   --  0.4   ALK PHOS U/L  --   --   --   --  70   ALT (SGPT) U/L  --   --   --   --  6   AST (SGOT) U/L  --   --   --   --  18   GLUCOSE mg/dL 79 90 82   < > 194*    < > = values in this interval not displayed.     Estimated Creatinine Clearance: 16.4 mL/min (A) (by C-G formula based on SCr of 3.57 mg/dL (H)).  Results from last 7 days   Lab Units 11/16/24  0431 11/15/24  0544 11/14/24  0556   MAGNESIUM mg/dL  --   --  2.0   PHOSPHORUS mg/dL 4.0 6.0* 6.0*         Results from last 7 days   Lab Units 11/16/24  0431 11/15/24  0544 11/14/24  0556 11/13/24  0511 11/12/24  0440   WBC 10*3/mm3 5.45 4.77 6.17 5.01 4.30   HEMOGLOBIN g/dL 8.2* 7.7* 8.5* 8.5* 8.5*   PLATELETS 10*3/mm3 235 205 218 171 164           Assessment / Plan     ASSESSMENT:  End-stage renal disease - On home hemodialysis as an outpatient, though hard to imagine would be able to resume this given degree of confusion/agitation.  Access is LUE AVF.  RIJ TDC been removed.  He had dialysis yesterday without any difficulties, his electrolyte and volume status appears to be within acceptable range.    Anemia of chronic kidney disease, on long-acting RONALD as an outpatient.  Received 1 unit of PRBCs on 11/11, hemoglobin today 8.2.    Chronic hypotension - BP stable off  midodrine  Hypothyroidism, on replacement therapy  Acute encephalopathy - neurology and psychiatry following . Waxes & wanes.  Delirium suspected.  CT head noted, with no acute changes.    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  I discussed the case with the patient's wife at the bedside.  Copied text in this note has been reviewed and is accurate as of 11/16/24.       Addendum: d/w Dr Griffiths, MRI brain will be non contrast so no need for intensive HD after      Bart Wolf MD  11/16/24  10:33 [facility]    [facility]  [phone]

## 2024-11-16 NOTE — NURSING NOTE
Unable to complete neuro check at this time.  The patient's daughter, Jose, would like for the patient to sleep as much as possible.  The patient is calm, seems to be comfortable, and appears to be asleep.  Safety round completed.  Will continue to monitor.

## 2024-11-16 NOTE — PROGRESS NOTES
Dedicated to Hospital Care    [phone]   LOS: 15 days     Name: Nelson Wang  Age/Sex: 79 y.o. male  :  1945        PCP: Domingo Bravo MD  No chief complaint on file.     Subjective   He had a rough day on dialysis yesterday and ended up in restraints.  This morning he is awake and talking but his mental status wax and wanes as does his orientation and activity level.  Given cognitive status unable to assess review of systems sleepy and a little somnolent this morning    acetaminophen, 1,000 mg, Oral, Q8H  arformoterol, 15 mcg, Nebulization, BID - RT  ARIPiprazole, 2 mg, Oral, BID  atorvastatin, 40 mg, Oral, Daily  budesonide, 0.5 mg, Nebulization, BID - RT  Check Fentanyl Patch Placement, 1 each, Not Applicable, Q12H  heparin (porcine), 5,000 Units, Subcutaneous, Q8H  insulin regular, 2-7 Units, Subcutaneous, 4x Daily AC & at Bedtime  levothyroxine, 175 mcg, Oral, Q AM  Lidocaine, 1 patch, Transdermal, Q24H  Lidocaine, 1 patch, Transdermal, Q24H  pantoprazole, 40 mg, Intravenous, BID AC  saccharomyces boulardii, 250 mg, Oral, BID  senna-docusate sodium, 2 tablet, Oral, Daily  sertraline, 100 mg, Oral, Nightly  sevelamer, 800 mg, Oral, TID With Meals  torsemide, 100 mg, Oral, Once per day on   Vancomycin Pharmacy Intermittent/Pulse Dosing, , Not Applicable, Daily      Pharmacy to dose vancomycin,         Objective   Vital Signs  Temp:  [97.3 °F (36.3 °C)-98.2 °F (36.8 °C)] 98.2 °F (36.8 °C)  Heart Rate:  [] 84  Resp:  [14-20] 20  BP: (130-182)/(56-78) 130/56  Body mass index is 24.35 kg/m².    Intake/Output Summary (Last 24 hours) at 2024 0614  Last data filed at 2024 0344  Gross per 24 hour   Intake 470 ml   Output 1800 ml   Net -1330 ml       Physical Exam  Vitals reviewed.   Constitutional:       General: He is in acute distress.      Appearance: He is ill-appearing.   Cardiovascular:      Rate and Rhythm: Normal rate and regular rhythm.    Pulmonary:      Effort: Pulmonary effort is normal. No respiratory distress.   Abdominal:      General: Bowel sounds are normal.      Palpations: Abdomen is soft.   Skin:     General: Skin is warm and dry.   Neurological:      General: No focal deficit present.      Mental Status: He is alert. He is disoriented.           Results Review:       I reviewed the patient's new clinical results.  Results from last 7 days   Lab Units 11/16/24  0431 11/15/24  0544 11/14/24  0556 11/13/24  0511 11/12/24 0440 11/11/24  0539 11/10/24  0623   WBC 10*3/mm3 5.45 4.77 6.17 5.01 4.30 4.97 5.82   HEMOGLOBIN g/dL 8.2* 7.7* 8.5* 8.5* 8.5* 7.4* 7.2*   PLATELETS 10*3/mm3 235 205 218 171 164 165 159     Results from last 7 days   Lab Units 11/16/24  0431 11/15/24  0544 11/14/24  0556 11/13/24  0511 11/12/24 0440 11/11/24  0539 11/10/24  0622   SODIUM mmol/L 135* 133* 129* 132* 131* 133* 132*   POTASSIUM mmol/L 3.9 4.4 4.3 4.1 4.3 3.6 3.3*   CHLORIDE mmol/L 97* 96* 96* 96* 98 91* 94*   CO2 mmol/L 26.1 21.1* 23.1 21.0* 23.1 23.0 22.7   BUN mg/dL 17 38* 29* 62* 45* 82* 74*   CREATININE mg/dL 3.57* 5.67* 3.98* 5.99* 4.78* 7.12* 6.65*   CALCIUM mg/dL 8.9 8.7 9.1 8.3* 8.1* 7.9* 8.3*   MAGNESIUM mg/dL  --   --  2.0  --   --   --   --    PHOSPHORUS mg/dL 4.0 6.0* 6.0* 5.5* 6.0* 6.2* 4.2   Estimated Creatinine Clearance: 16.4 mL/min (A) (by C-G formula based on SCr of 3.57 mg/dL (H)).      Assessment & Plan   Active Hospital Problems    Diagnosis  POA    **Discitis of lumbar region [M46.46]  Yes    Severe malnutrition [E43]  Yes    Discitis [M46.40]  Yes    Metabolic encephalopathy [G93.41]  Yes    Aspiration pneumonia [J69.0]  Yes    ESRD on dialysis [N18.6, Z99.2]  Not Applicable    Bipolar disorder [F31.9]  Yes    Chronic respiratory failure with hypoxia [J96.11]  Yes    Essential hypertension [I10]  Yes    Anemia due to chronic kidney disease [N18.9, D63.1]  Yes    Type 2 diabetes mellitus without complication [E11.9]  Yes      Resolved  Hospital Problems   No resolved problems to display.       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  -He will need to be on antibiotics through December 14.  He will need weekly labs done and faxed to infectious disease.  -Repeat blood cultures were negative and would appear to have the infection under control but certainly still with issues in the spine.  -Appreciate neurology evaluation planning to continue with aggressive delirium precautions for now; no cognitive improvement at this time MRI and EEG show no concerning findings.  This is all likely delirium.  -Clearly use of benzodiazepines and delirium does increase her risk for ongoing confusion.  But this is needed to get through dialysis.  Could be an exacerbating issue for what occurred last evening.  -Continue p.o. for now but low threshold for making n.p.o. when mentation is not appropriate  -Appreciate psychiatry's input with medications plan to continue meds with dialysis but continues to have impulsive behaviors and issues with confusion.  He does better when his family is at the bedside.  His daughter's been spending nights with him here in the hospital.  -Appreciate nephrology input and assistance remains on HD.    -Will hold off on replacement of core track if caloric intake and protein intake is not high enough would likely move forward with PEG placement.  -Sadly at this point I think his overall prognosis is pretty guarded.  Palliative care did meet with the patient and  his family earlier in the hospitalization.  Their goals at that time remain the  same as they are now which is curative care.  They wanted to return to his baseline of the level of function.  At this point I think that she was probably stable.  After 11 days in the hospital and previous hospitalization prior to this and active infectious issues the likelihood that he returns to his prior level of function is extremely low.  He is not a great candidate for cardiopulmonary resuscitation given his advanced age end-stage renal disease anemia of chronic disease and acute and chronic issues.  He remains a full code at this time      Disposition  Expected Discharge Date: 11/19/2024; Expected Discharge Time:    Possible discharge Monday or Tuesday planning home with outpatient dialysis in hopes that his mentation improves with transition home.    Donovan Griffiths MD  [facility]  11/16/24  06:14 EST

## 2024-11-16 NOTE — PROGRESS NOTES
The patient is dressed up in a chair today but remains quite confused.  EEG apparently showed signs of delirium, MRI was negative.  Continuing current management.

## 2024-11-16 NOTE — PLAN OF CARE
Goal Outcome Evaluation:  Plan of Care Reviewed With: patient        Progress: no change  Outcome Evaluation: vitals stable.  pt did not express any pain, but did appear to be in pain.  meds given per orders.  hemodialysis patient.  MRI completed.  sitter at bedside.  will continue to monitor.

## 2024-11-16 NOTE — PLAN OF CARE
Goal Outcome Evaluation:         A/O to self, on 1L NC, tele monitoring dc'ed. Medicated for pain as needed. PRN zyprexa given once. Sitter at bedside for patient safety. Pt constipated, stool softeners & laxatives given. VSS.

## 2024-11-16 NOTE — NURSING NOTE
At the end of report and when introducing myself to the pt and family, I noticed that the pt's bilateral wrist restraints were off.  According to the report, the pt had been in bilateral wrist restraints earlier today due to increased confusion, agitation, and trying to pull things off including when he was in dialysis.  I asked about why the restraints were off to both the dayshift RN and CNA in the room sitting with the patient.  The dayshift RN stated that she was under the impression that he was in restraints after being transferred to the unit from dialysis.  The CNA stated that his wife and daughter refused the restraints at this time.  I asked the pt's daughter and wife about the restraints and they stated that they did not want him to be in restraints and that he only needed them during dialysis.  Education completed.  Both verbalized understanding and stated they did not want him to be in restraints.  Restraints were kept off of the patient and discontinued.  The pt's daughter stated she would be staying with him throughout the night.  Sitter will remain at bedside.  Will continue to monitor.

## 2024-11-16 NOTE — NURSING NOTE
The patient's daughter, Jose, did not want me to perform an assessment at this time.  The pt is calm, seems to be comfortable, and appears to be asleep.  Education completed and she verbalized understanding.  Safety round completed.  Will continue to monitor.

## 2024-11-17 LAB
ALBUMIN SERPL-MCNC: 3.1 G/DL (ref 3.5–5.2)
ANION GAP SERPL CALCULATED.3IONS-SCNC: 14.3 MMOL/L (ref 5–15)
BASOPHILS # BLD AUTO: 0.04 10*3/MM3 (ref 0–0.2)
BASOPHILS NFR BLD AUTO: 0.7 % (ref 0–1.5)
BUN SERPL-MCNC: 27 MG/DL (ref 8–23)
BUN/CREAT SERPL: 5.2 (ref 7–25)
CALCIUM SPEC-SCNC: 9.1 MG/DL (ref 8.6–10.5)
CHLORIDE SERPL-SCNC: 94 MMOL/L (ref 98–107)
CO2 SERPL-SCNC: 23.7 MMOL/L (ref 22–29)
CREAT SERPL-MCNC: 5.24 MG/DL (ref 0.76–1.27)
DEPRECATED RDW RBC AUTO: 48.8 FL (ref 37–54)
EGFRCR SERPLBLD CKD-EPI 2021: 10.6 ML/MIN/1.73
EOSINOPHIL # BLD AUTO: 0.17 10*3/MM3 (ref 0–0.4)
EOSINOPHIL NFR BLD AUTO: 2.9 % (ref 0.3–6.2)
ERYTHROCYTE [DISTWIDTH] IN BLOOD BY AUTOMATED COUNT: 14.7 % (ref 12.3–15.4)
GLUCOSE BLDC GLUCOMTR-MCNC: 106 MG/DL (ref 70–130)
GLUCOSE BLDC GLUCOMTR-MCNC: 109 MG/DL (ref 70–130)
GLUCOSE BLDC GLUCOMTR-MCNC: 110 MG/DL (ref 70–130)
GLUCOSE BLDC GLUCOMTR-MCNC: 115 MG/DL (ref 70–130)
GLUCOSE SERPL-MCNC: 116 MG/DL (ref 65–99)
HCT VFR BLD AUTO: 28 % (ref 37.5–51)
HGB BLD-MCNC: 8.7 G/DL (ref 13–17.7)
IMM GRANULOCYTES # BLD AUTO: 0.01 10*3/MM3 (ref 0–0.05)
IMM GRANULOCYTES NFR BLD AUTO: 0.2 % (ref 0–0.5)
LYMPHOCYTES # BLD AUTO: 1.71 10*3/MM3 (ref 0.7–3.1)
LYMPHOCYTES NFR BLD AUTO: 29.3 % (ref 19.6–45.3)
MCH RBC QN AUTO: 28.7 PG (ref 26.6–33)
MCHC RBC AUTO-ENTMCNC: 31.1 G/DL (ref 31.5–35.7)
MCV RBC AUTO: 92.4 FL (ref 79–97)
MONOCYTES # BLD AUTO: 0.92 10*3/MM3 (ref 0.1–0.9)
MONOCYTES NFR BLD AUTO: 15.8 % (ref 5–12)
NEUTROPHILS NFR BLD AUTO: 2.99 10*3/MM3 (ref 1.7–7)
NEUTROPHILS NFR BLD AUTO: 51.1 % (ref 42.7–76)
NRBC BLD AUTO-RTO: 0 /100 WBC (ref 0–0.2)
PHOSPHATE SERPL-MCNC: 4 MG/DL (ref 2.5–4.5)
PLATELET # BLD AUTO: 204 10*3/MM3 (ref 140–450)
PMV BLD AUTO: 9.6 FL (ref 6–12)
POTASSIUM SERPL-SCNC: 4 MMOL/L (ref 3.5–5.2)
RBC # BLD AUTO: 3.03 10*6/MM3 (ref 4.14–5.8)
SODIUM SERPL-SCNC: 132 MMOL/L (ref 136–145)
WBC NRBC COR # BLD AUTO: 5.84 10*3/MM3 (ref 3.4–10.8)

## 2024-11-17 PROCEDURE — 94799 UNLISTED PULMONARY SVC/PX: CPT

## 2024-11-17 PROCEDURE — 80069 RENAL FUNCTION PANEL: CPT | Performed by: INTERNAL MEDICINE

## 2024-11-17 PROCEDURE — 25010000002 HEPARIN (PORCINE) PER 1000 UNITS: Performed by: STUDENT IN AN ORGANIZED HEALTH CARE EDUCATION/TRAINING PROGRAM

## 2024-11-17 PROCEDURE — 94761 N-INVAS EAR/PLS OXIMETRY MLT: CPT

## 2024-11-17 PROCEDURE — 82948 REAGENT STRIP/BLOOD GLUCOSE: CPT

## 2024-11-17 PROCEDURE — 25010000002 OLANZAPINE 10 MG RECONSTITUTED SOLUTION: Performed by: SPECIALIST

## 2024-11-17 PROCEDURE — 85025 COMPLETE CBC W/AUTO DIFF WBC: CPT | Performed by: INTERNAL MEDICINE

## 2024-11-17 PROCEDURE — 94664 DEMO&/EVAL PT USE INHALER: CPT

## 2024-11-17 RX ORDER — LORAZEPAM 0.5 MG/1
0.5 TABLET ORAL DAILY PRN
Status: DISCONTINUED | OUTPATIENT
Start: 2024-11-17 | End: 2024-11-18 | Stop reason: HOSPADM

## 2024-11-17 RX ORDER — MANNITOL 250 MG/ML
12.5 INJECTION, SOLUTION INTRAVENOUS AS NEEDED
Status: CANCELLED | OUTPATIENT
Start: 2024-11-18 | End: 2024-11-18

## 2024-11-17 RX ADMIN — ACETAMINOPHEN 1000 MG: 500 TABLET ORAL at 01:54

## 2024-11-17 RX ADMIN — LIDOCAINE 1 PATCH: 4 PATCH TOPICAL at 13:12

## 2024-11-17 RX ADMIN — OXYCODONE HYDROCHLORIDE 5 MG: 5 SOLUTION ORAL at 03:55

## 2024-11-17 RX ADMIN — BUDESONIDE 0.5 MG: 0.5 INHALANT ORAL at 08:36

## 2024-11-17 RX ADMIN — ACETAMINOPHEN 1000 MG: 500 TABLET ORAL at 17:30

## 2024-11-17 RX ADMIN — SERTRALINE 100 MG: 100 TABLET, FILM COATED ORAL at 21:29

## 2024-11-17 RX ADMIN — LIDOCAINE 1 PATCH: 4 PATCH TOPICAL at 13:11

## 2024-11-17 RX ADMIN — ARFORMOTEROL TARTRATE 15 MCG: 15 SOLUTION RESPIRATORY (INHALATION) at 17:50

## 2024-11-17 RX ADMIN — TORSEMIDE 100 MG: 100 TABLET ORAL at 07:56

## 2024-11-17 RX ADMIN — SEVELAMER CARBONATE 0.8 G: 800 FOR SUSPENSION ORAL at 11:38

## 2024-11-17 RX ADMIN — HEPARIN SODIUM 5000 UNITS: 5000 INJECTION INTRAVENOUS; SUBCUTANEOUS at 21:29

## 2024-11-17 RX ADMIN — OXYCODONE HYDROCHLORIDE 5 MG: 5 SOLUTION ORAL at 14:57

## 2024-11-17 RX ADMIN — PANTOPRAZOLE SODIUM 40 MG: 40 INJECTION, POWDER, FOR SOLUTION INTRAVENOUS at 17:29

## 2024-11-17 RX ADMIN — IPRATROPIUM BROMIDE AND ALBUTEROL SULFATE 3 ML: 2.5; .5 SOLUTION RESPIRATORY (INHALATION) at 17:43

## 2024-11-17 RX ADMIN — Medication 250 MG: at 21:29

## 2024-11-17 RX ADMIN — POLYETHYLENE GLYCOL 3350 17 G: 17 POWDER, FOR SOLUTION ORAL at 03:55

## 2024-11-17 RX ADMIN — Medication 250 MG: at 07:55

## 2024-11-17 RX ADMIN — ARFORMOTEROL TARTRATE 15 MCG: 15 SOLUTION RESPIRATORY (INHALATION) at 08:35

## 2024-11-17 RX ADMIN — PANTOPRAZOLE SODIUM 40 MG: 40 INJECTION, POWDER, FOR SOLUTION INTRAVENOUS at 07:55

## 2024-11-17 RX ADMIN — HEPARIN SODIUM 5000 UNITS: 5000 INJECTION INTRAVENOUS; SUBCUTANEOUS at 13:11

## 2024-11-17 RX ADMIN — ARIPIPRAZOLE 2 MG: 2 TABLET ORAL at 21:29

## 2024-11-17 RX ADMIN — ATORVASTATIN CALCIUM 40 MG: 20 TABLET, FILM COATED ORAL at 07:55

## 2024-11-17 RX ADMIN — ARIPIPRAZOLE 2 MG: 2 TABLET ORAL at 07:56

## 2024-11-17 RX ADMIN — SEVELAMER CARBONATE 0.8 G: 800 FOR SUSPENSION ORAL at 07:56

## 2024-11-17 RX ADMIN — LEVOTHYROXINE SODIUM 175 MCG: 175 TABLET ORAL at 06:10

## 2024-11-17 RX ADMIN — HEPARIN SODIUM 5000 UNITS: 5000 INJECTION INTRAVENOUS; SUBCUTANEOUS at 06:10

## 2024-11-17 RX ADMIN — BISACODYL 5 MG: 5 TABLET, COATED ORAL at 07:56

## 2024-11-17 RX ADMIN — OXYCODONE HYDROCHLORIDE 5 MG: 5 SOLUTION ORAL at 07:53

## 2024-11-17 RX ADMIN — SENNOSIDES AND DOCUSATE SODIUM 2 TABLET: 50; 8.6 TABLET ORAL at 07:55

## 2024-11-17 RX ADMIN — BUDESONIDE 0.5 MG: 0.5 INHALANT ORAL at 17:52

## 2024-11-17 RX ADMIN — ACETAMINOPHEN 1000 MG: 500 TABLET ORAL at 07:55

## 2024-11-17 RX ADMIN — OLANZAPINE 5 MG: 10 INJECTION, POWDER, LYOPHILIZED, FOR SOLUTION INTRAMUSCULAR at 22:32

## 2024-11-17 NOTE — PROGRESS NOTES
Ded[facility] to [facility]    [phone]   LOS: 16 days     Name: Nelson Wang  Age/Sex: 79 y.o. male  :  1945        PCP: Domingo Bravo MD  No chief complaint on file.     Subjective   He had a rough day on dialysis yesterday and ended up in restraints.  This morning he is awake and talking but his mental status wax and wanes as does his orientation and activity level.  Given cognitive status unable to assess review of systems sleepy and a little somnolent this morning    acetaminophen, 1,000 mg, Oral, Q8H  arformoterol, 15 mcg, Nebulization, BID - RT  ARIPiprazole, 2 mg, Oral, BID  atorvastatin, 40 mg, Oral, Daily  budesonide, 0.5 mg, Nebulization, BID - RT  Check Fentanyl Patch Placement, 1 each, Not Applicable, Q12H  heparin (porcine), 5,000 Units, Subcutaneous, Q8H  insulin regular, 2-7 Units, Subcutaneous, 4x Daily AC & at Bedtime  levothyroxine, 175 mcg, Oral, Q AM  Lidocaine, 1 patch, Transdermal, Q24H  Lidocaine, 1 patch, Transdermal, Q24H  pantoprazole, 40 mg, Intravenous, BID AC  saccharomyces boulardii, 250 mg, Oral, BID  senna-docusate sodium, 2 tablet, Oral, Daily  sertraline, 100 mg, Oral, Nightly  sevelamer, 800 mg, Oral, TID With Meals  torsemide, 100 mg, Oral, Once per day on   Vancomycin Pharmacy Intermittent/Pulse Dosing, , Not Applicable, Daily      Pharmacy to dose vancomycin,         Objective   Vital Signs  Temp:  [97.5 °F (36.4 °C)-98.8 °F (37.1 °C)] 98.8 °F (37.1 °C)  Heart Rate:  [72-95] 75  Resp:  [18-20] 20  BP: (117-162)/(57-95) 162/95  Body mass index is 25.78 kg/m².    Intake/Output Summary (Last 24 hours) at 2024 1218  Last data filed at 2024 1211  Gross per 24 hour   Intake 360 ml   Output --   Net 360 ml       Physical Exam  Vitals reviewed.   Constitutional:       General: He is in acute distress.      Appearance: He is ill-appearing.   Cardiovascular:      Rate and Rhythm: Normal rate and regular rhythm.   Pulmonary:       Effort: Pulmonary effort is normal. No respiratory distress.   Abdominal:      General: Bowel sounds are normal.      Palpations: Abdomen is soft.   Skin:     General: Skin is warm and dry.   Neurological:      General: No focal deficit present.      Mental Status: He is alert. He is disoriented.           Results Review:       I reviewed the patient's new clinical results.  Results from last 7 days   Lab Units 11/17/24 0431 11/16/24  0431 11/15/24  0544 11/14/24  0556 11/13/24  0511 11/12/24  0440 11/11/24  0539   WBC 10*3/mm3 5.84 5.45 4.77 6.17 5.01 4.30 4.97   HEMOGLOBIN g/dL 8.7* 8.2* 7.7* 8.5* 8.5* 8.5* 7.4*   PLATELETS 10*3/mm3 204 235 205 218 171 164 165     Results from last 7 days   Lab Units 11/17/24  0431 11/16/24  0431 11/15/24  0544 11/14/24  0556 11/13/24  0511 11/12/24 0440 11/11/24  0539   SODIUM mmol/L 132* 135* 133* 129* 132* 131* 133*   POTASSIUM mmol/L 4.0 3.9 4.4 4.3 4.1 4.3 3.6   CHLORIDE mmol/L 94* 97* 96* 96* 96* 98 91*   CO2 mmol/L 23.7 26.1 21.1* 23.1 21.0* 23.1 23.0   BUN mg/dL 27* 17 38* 29* 62* 45* 82*   CREATININE mg/dL 5.24* 3.57* 5.67* 3.98* 5.99* 4.78* 7.12*   CALCIUM mg/dL 9.1 8.9 8.7 9.1 8.3* 8.1* 7.9*   MAGNESIUM mg/dL  --   --   --  2.0  --   --   --    PHOSPHORUS mg/dL 4.0 4.0 6.0* 6.0* 5.5* 6.0* 6.2*   Estimated Creatinine Clearance: 11.8 mL/min (A) (by C-G formula based on SCr of 5.24 mg/dL (H)).      Assessment & Plan   Active Hospital Problems    Diagnosis  POA    **Discitis of lumbar region [M46.46]  Yes    Severe malnutrition [E43]  Yes    Discitis [M46.40]  Yes    Metabolic encephalopathy [G93.41]  Yes    Aspiration pneumonia [J69.0]  Yes    ESRD on dialysis [N18.6, Z99.2]  Not Applicable    Bipolar disorder [F31.9]  Yes    Chronic respiratory failure with hypoxia [J96.11]  Yes    Essential hypertension [I10]  Yes    Anemia due to chronic kidney disease [N18.9, D63.1]  Yes    Type 2 diabetes mellitus without complication [E11.9]  Yes      Resolved Hospital  Problems   No resolved problems to display.       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  -He will need to be on antibiotics through December 14.  He will need weekly labs done and faxed to infectious disease.  -Repeat blood cultures were negative and would appear to have the infection under control but certainly still with issues in the spine.  -Appreciate neurology evaluation planning to continue with aggressive delirium precautions for now; no cognitive improvement at this time MRI and EEG show no concerning findings.  This is all likely delirium.  -Clearly use of benzodiazepines and delirium does increase her risk for ongoing confusion.  But this is needed to get through dialysis.  Could be an exacerbating issue for what occurred last evening.  -Continue p.o. for now but low threshold for making n.p.o. when mentation is not appropriate  -Appreciate psychiatry's input with medications plan to continue meds with dialysis but continues to have impulsive behaviors and issues with confusion.  He does better when his family is at the bedside.  His daughter's been spending nights with him here in the hospital.  -Appreciate nephrology input and assistance remains on HD.    -Will hold off on replacement of core track if caloric intake and protein intake is not high enough would likely move forward with PEG placement.  -Sadly at this point I think his overall prognosis is pretty guarded.  Palliative care did meet with the patient and  his family earlier in the hospitalization.  Their goals at that time remain the same as  they are now which is curative care.  They wanted to return to his baseline of the level of function.  At this point I think that she was probably stable.  After 11 days in the hospital and previous hospitalization prior to this and active infectious issues the likelihood that he returns to his prior level of function is extremely low.  He is not a great candidate for cardiopulmonary resuscitation given his advanced age end-stage renal disease anemia of chronic disease and acute and chronic issues.  He remains a full code at this time      Disposition  Expected Discharge Date: 11/19/2024; Expected Discharge Time:    Possible discharge Monday or Tuesday planning home with outpatient dialysis in hopes that his mentation improves with transition home.    Donovan Griffiths MD  [facility]  11/17/24  12:18 EST

## 2024-11-17 NOTE — PLAN OF CARE
Goal Outcome Evaluation:      VSS, A/O to self. Pain medication continued as needed. Sitter remains at bedside. Miralax given for constipation. Will continue plan of care.      Problem: Adult Inpatient Plan of Care  Goal: Plan of Care Review  Outcome: Progressing  Goal: Patient-Specific Goal (Individualized)  Outcome: Progressing     Problem: Comorbidity Management  Goal: Blood Glucose Level Within Target Range  Outcome: Progressing     Problem: Skin Injury Risk Increased  Goal: Skin Health and Integrity  Outcome: Progressing  Intervention: Optimize Skin Protection  Recent Flowsheet Documentation  Taken 11/17/2024 0017 by Ke Raygoza RN  Head of Bed (HOB) Positioning: HOB elevated  Taken 11/16/2024 2134 by Ke Raygoza RN  Activity Management: activity minimized  Head of Bed (HOB) Positioning: HOB elevated     Problem: Fall Injury Risk  Goal: Absence of Fall and Fall-Related Injury  Outcome: Progressing  Intervention: Promote Injury-Free Environment  Recent Flowsheet Documentation  Taken 11/17/2024 0403 by Ke Raygoza RN  Safety Promotion/Fall Prevention:   safety round/check completed   room organization consistent   assistive device/personal items within reach   activity supervised  Taken 11/17/2024 0201 by Ke Raygoza RN  Safety Promotion/Fall Prevention:   safety round/check completed   room organization consistent   activity supervised   assistive device/personal items within reach  Taken 11/17/2024 0017 by Ke Raygoza RN  Safety Promotion/Fall Prevention:   safety round/check completed   room organization consistent   clutter free environment maintained   assistive device/personal items within reach  Taken 11/16/2024 2233 by Ke Raygoza RN  Safety Promotion/Fall Prevention:   safety round/check completed   assistive device/personal items within reach   activity supervised  Taken 11/16/2024 2134 by Ke Raygoza, RN  Safety Promotion/Fall Prevention:   activity supervised   safety  round/check completed   room organization consistent   clutter free environment maintained   fall prevention program maintained     Problem: Violence Risk or Actual  Goal: Anger and Impulse Control  Outcome: Progressing  Intervention: Minimize Safety Risk  Recent Flowsheet Documentation  Taken 11/17/2024 0201 by Ke Raygoza RN  Enhanced Safety Measures:  at bedside  Taken 11/17/2024 0017 by Ke Raygoza RN  Enhanced Safety Measures:  at bedside  Taken 11/16/2024 2233 by Ke Raygoza RN  Enhanced Safety Measures:  at bedside  Taken 11/16/2024 2134 by Ke Raygoza RN  Enhanced Safety Measures:  at bedside     Problem: Restraint, Nonviolent  Goal: Absence of Harm or Injury  Outcome: Progressing  Intervention: Protect Dignity, Rights and Personal Wellbeing  Recent Flowsheet Documentation  Taken 11/16/2024 2134 by eK Raygoza RN  Trust Relationship/Rapport:   care explained   choices provided  Intervention: Protect Skin and Joint Integrity  Recent Flowsheet Documentation  Taken 11/17/2024 0017 by Ke Raygoza RN  Body Position: position changed independently  Taken 11/16/2024 2134 by Ke Raygoza RN  Body Position: position changed independently     Problem: Restraint, Nonviolent  Goal: Absence of Harm or Injury  Outcome: Progressing  Intervention: Protect Dignity, Rights and Personal Wellbeing  Recent Flowsheet Documentation  Taken 11/16/2024 2134 by Ke Raygoza RN  Trust Relationship/Rapport:   care explained   choices provided  Intervention: Protect Skin and Joint Integrity  Recent Flowsheet Documentation  Taken 11/17/2024 0017 by Ke Raygoza RN  Body Position: position changed independently  Taken 11/16/2024 2134 by Ke Raygoza RN  Body Position: position changed independently     Problem: Pain Acute  Goal: Optimal Pain Control and Function  Outcome: Progressing  Intervention: Develop Pain Management Plan  Recent  Flowsheet Documentation  Taken 11/16/2024 2134 by Ke Raygoza, RN  Pain Management Interventions: pain medication given     Problem: Infection  Goal: Absence of Infection Signs and Symptoms  Outcome: Progressing     Problem: Hemodialysis  Goal: Safe, Effective Therapy Delivery  Outcome: Progressing  Goal: Effective Tissue Perfusion  Outcome: Progressing  Goal: Absence of Infection Signs and Symptoms  Outcome: Progressing  Intervention: Prevent or Manage Infection  Recent Flowsheet Documentation  Taken 11/17/2024 0017 by Ke Raygoza, RN  Infection Prevention:   rest/sleep promoted   single patient room provided  Taken 11/16/2024 2134 by Ke Raygoza, RN  Infection Prevention:   rest/sleep promoted   single patient room provided

## 2024-11-17 NOTE — PROGRESS NOTES
[facility] Progress Note      Patient Name: Nelson Wang  : 1945  MRN: [id number]  Primary Care Physician:  Domingo Bravo MD  Date of admission: 2024    Subjective     Interval History:   F/u ESRD  The patient is awake but confused he is much less restless apparently ate 75% of his breakfast today according to the sitter.  Review of Systems:   Not obtainable    Objective     Vitals:   Temp:  [97.5 °F (36.4 °C)-98.8 °F (37.1 °C)] 98.8 °F (37.1 °C)  Heart Rate:  [72-95] 75  Resp:  [18-20] 20  BP: (117-162)/(57-95) 162/95  Flow (L/min) (Oxygen Therapy):  [1-2] 1    Intake/Output Summary (Last 24 hours) at 2024 1006  Last data filed at 2024 0813  Gross per 24 hour   Intake 460 ml   Output --   Net 460 ml       Physical Exam:    General Appearance: Awake, disoriented and confused not answering question, chronically ill, no acute  Neck: No JVD  Lungs: Bilateral rhonchi, breathing effort not clear  Heart: RRR, no rub  Abdomen: soft, nontender, nondistended  Extremities: no edema, cyanosis or clubbing, he has functional AV fistula in the left upper arm with good thrill and bruit       Scheduled Meds:     acetaminophen, 1,000 mg, Oral, Q8H  arformoterol, 15 mcg, Nebulization, BID - RT  ARIPiprazole, 2 mg, Oral, BID  atorvastatin, 40 mg, Oral, Daily  budesonide, 0.5 mg, Nebulization, BID - RT  Check Fentanyl Patch Placement, 1 each, Not Applicable, Q12H  heparin (porcine), 5,000 Units, Subcutaneous, Q8H  insulin regular, 2-7 Units, Subcutaneous, 4x Daily AC & at Bedtime  levothyroxine, 175 mcg, Oral, Q AM  Lidocaine, 1 patch, Transdermal, Q24H  Lidocaine, 1 patch, Transdermal, Q24H  pantoprazole, 40 mg, Intravenous, BID AC  saccharomyces boulardii, 250 mg, Oral, BID  senna-docusate sodium, 2 tablet, Oral, Daily  sertraline, 100 mg, Oral, Nightly  sevelamer, 800 mg, Oral, TID With Meals  torsemide, 100 mg, Oral, Once per day on   Saturday  Vancomycin Pharmacy Intermittent/Pulse Dosing, , Not Applicable, Daily      IV Meds:   Pharmacy to dose vancomycin,         Results Reviewed:   I have personally reviewed the results from the time of this admission to 11/17/2024 10:06 EST     Results from last 7 days   Lab Units 11/17/24  0431 11/16/24  0431 11/15/24  0544   SODIUM mmol/L 132* 135* 133*   POTASSIUM mmol/L 4.0 3.9 4.4   CHLORIDE mmol/L 94* 97* 96*   CO2 mmol/L 23.7 26.1 21.1*   BUN mg/dL 27* 17 38*   CREATININE mg/dL 5.24* 3.57* 5.67*   CALCIUM mg/dL 9.1 8.9 8.7   GLUCOSE mg/dL 116* 79 90     Estimated Creatinine Clearance: 11.8 mL/min (A) (by C-G formula based on SCr of 5.24 mg/dL (H)).  Results from last 7 days   Lab Units 11/17/24  0431 11/16/24  0431 11/15/24  0544 11/14/24  0556   MAGNESIUM mg/dL  --   --   --  2.0   PHOSPHORUS mg/dL 4.0 4.0 6.0* 6.0*         Results from last 7 days   Lab Units 11/17/24  0431 11/16/24  0431 11/15/24  0544 11/14/24  0556 11/13/24  0511   WBC 10*3/mm3 5.84 5.45 4.77 6.17 5.01   HEMOGLOBIN g/dL 8.7* 8.2* 7.7* 8.5* 8.5*   PLATELETS 10*3/mm3 204 235 205 218 171           Assessment / Plan     ASSESSMENT:  End-stage renal disease - On home hemodialysis as an outpatient, though hard to imagine would be able to resume this given degree of confusion/agitation.  Access is LUE AVF.  RIJ TDC been removed.  He had dialysis yesterday without any difficulties, appears euvolemic, sodium is 132.    Anemia of chronic kidney disease, on long-acting RONALD as an outpatient.  Received 1 unit of PRBCs on 11/11, hemoglobin today 8.7.    Chronic hypotension - BP stable off midodrine  Hypothyroidism, on replacement therapy  Acute encephalopathy - neurology and psychiatry following . Waxes & wanes.  Delirium suspected.  CT head noted, with no acute changes.    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  Copied text in this note has been reviewed and is accurate as of 11/17/24.             Bart Wolf MD  11/17/24  10:06 [facility]    [facility]  [phone]

## 2024-11-17 NOTE — PROGRESS NOTES
"[facility] Clinical Pharmacy Services: Vancomycin Monitoring Note    Nelson Wang is a 79 y.o. male who is on day 17 of pharmacy to dose vancomycin for L1/2 and T5/6 discitis.  Stop date: 12/13/24     Previous Vancomycin Dose: Intermittent pulse dosing     Updated Cultures and Sensitivities:   10/31: BCx-CoN Staph (2 of 2 bottles)  10/31: MRSA PCR-negative  11/2: BCx-negative    Results from last 7 days   Lab Units 11/15/24  0544 11/13/24  0511 11/11/24  0539   VANCOMYCIN RM mcg/mL 19.80 18.40 19.00     Vitals/Labs  Ht: 167 cm (65.75\"); Wt: 71.9 kg (158 lb 8.2 oz)   Temp Readings from Last 1 Encounters:   11/17/24 98.8 °F (37.1 °C) (Oral)     Estimated Creatinine Clearance: 11.8 mL/min (A) (by C-G formula based on SCr of 5.24 mg/dL (H)).   Results from last 7 days   Lab Units 11/17/24  0431 11/16/24  0431 11/15/24  0544   CREATININE mg/dL 5.24* 3.57* 5.67*   WBC 10*3/mm3 5.84 5.45 4.77     Assessment/Plan  Next dialysis planned for 11/18.    Continue vancomycin intermittent dosing with HD. Vancomycin 500 mg x 1 given after last dialysis on 11/15.  Next Level Date and Time: Monday 11/18 with AM labs prior to HD  We will continue to monitor patient changes and dialysis schedule.     Thank you for involving pharmacy in this patient's care. Please contact pharmacy with any questions or concerns.       Peri Harley, PharmD  11/17/24 08:57 EST      "

## 2024-11-18 ENCOUNTER — READMISSION MANAGEMENT (OUTPATIENT)
Dept: CALL CENTER | Facility: HOSPITAL | Age: 79
End: 2024-11-18
Payer: MEDICARE

## 2024-11-18 VITALS
SYSTOLIC BLOOD PRESSURE: 106 MMHG | BODY MASS INDEX: 24.06 KG/M2 | DIASTOLIC BLOOD PRESSURE: 92 MMHG | OXYGEN SATURATION: 92 % | HEART RATE: 92 BPM | RESPIRATION RATE: 18 BRPM | WEIGHT: 149.69 LBS | TEMPERATURE: 98.4 F | HEIGHT: 66 IN

## 2024-11-18 LAB
ALBUMIN SERPL-MCNC: 3.4 G/DL (ref 3.5–5.2)
ANION GAP SERPL CALCULATED.3IONS-SCNC: 16.7 MMOL/L (ref 5–15)
BASOPHILS # BLD AUTO: 0.05 10*3/MM3 (ref 0–0.2)
BASOPHILS NFR BLD AUTO: 1 % (ref 0–1.5)
BUN SERPL-MCNC: 37 MG/DL (ref 8–23)
BUN/CREAT SERPL: 5.6 (ref 7–25)
CALCIUM SPEC-SCNC: 9.4 MG/DL (ref 8.6–10.5)
CHLORIDE SERPL-SCNC: 95 MMOL/L (ref 98–107)
CO2 SERPL-SCNC: 25.3 MMOL/L (ref 22–29)
CREAT SERPL-MCNC: 6.62 MG/DL (ref 0.76–1.27)
DEPRECATED RDW RBC AUTO: 47.8 FL (ref 37–54)
EGFRCR SERPLBLD CKD-EPI 2021: 8 ML/MIN/1.73
EOSINOPHIL # BLD AUTO: 0.12 10*3/MM3 (ref 0–0.4)
EOSINOPHIL NFR BLD AUTO: 2.3 % (ref 0.3–6.2)
ERYTHROCYTE [DISTWIDTH] IN BLOOD BY AUTOMATED COUNT: 14.9 % (ref 12.3–15.4)
GLUCOSE BLDC GLUCOMTR-MCNC: 114 MG/DL (ref 70–130)
GLUCOSE BLDC GLUCOMTR-MCNC: 74 MG/DL (ref 70–130)
GLUCOSE BLDC GLUCOMTR-MCNC: 89 MG/DL (ref 70–130)
GLUCOSE SERPL-MCNC: 120 MG/DL (ref 65–99)
HCT VFR BLD AUTO: 27.4 % (ref 37.5–51)
HGB BLD-MCNC: 8.6 G/DL (ref 13–17.7)
IMM GRANULOCYTES # BLD AUTO: 0.03 10*3/MM3 (ref 0–0.05)
IMM GRANULOCYTES NFR BLD AUTO: 0.6 % (ref 0–0.5)
LYMPHOCYTES # BLD AUTO: 1.27 10*3/MM3 (ref 0.7–3.1)
LYMPHOCYTES NFR BLD AUTO: 24.8 % (ref 19.6–45.3)
MCH RBC QN AUTO: 28.3 PG (ref 26.6–33)
MCHC RBC AUTO-ENTMCNC: 31.4 G/DL (ref 31.5–35.7)
MCV RBC AUTO: 90.1 FL (ref 79–97)
MONOCYTES # BLD AUTO: 0.57 10*3/MM3 (ref 0.1–0.9)
MONOCYTES NFR BLD AUTO: 11.1 % (ref 5–12)
NEUTROPHILS NFR BLD AUTO: 3.09 10*3/MM3 (ref 1.7–7)
NEUTROPHILS NFR BLD AUTO: 60.2 % (ref 42.7–76)
NRBC BLD AUTO-RTO: 0 /100 WBC (ref 0–0.2)
PHOSPHATE SERPL-MCNC: 4.9 MG/DL (ref 2.5–4.5)
PLATELET # BLD AUTO: 197 10*3/MM3 (ref 140–450)
PMV BLD AUTO: 9.5 FL (ref 6–12)
POTASSIUM SERPL-SCNC: 4.4 MMOL/L (ref 3.5–5.2)
RBC # BLD AUTO: 3.04 10*6/MM3 (ref 4.14–5.8)
SODIUM SERPL-SCNC: 137 MMOL/L (ref 136–145)
VANCOMYCIN SERPL-MCNC: 20.5 MCG/ML (ref 5–40)
WBC NRBC COR # BLD AUTO: 5.13 10*3/MM3 (ref 3.4–10.8)

## 2024-11-18 PROCEDURE — 94799 UNLISTED PULMONARY SVC/PX: CPT

## 2024-11-18 PROCEDURE — 85025 COMPLETE CBC W/AUTO DIFF WBC: CPT | Performed by: INTERNAL MEDICINE

## 2024-11-18 PROCEDURE — 94664 DEMO&/EVAL PT USE INHALER: CPT

## 2024-11-18 PROCEDURE — 80069 RENAL FUNCTION PANEL: CPT | Performed by: INTERNAL MEDICINE

## 2024-11-18 PROCEDURE — 25010000002 VANCOMYCIN PER 500 MG: Performed by: HOSPITALIST

## 2024-11-18 PROCEDURE — 82948 REAGENT STRIP/BLOOD GLUCOSE: CPT

## 2024-11-18 PROCEDURE — 80202 ASSAY OF VANCOMYCIN: CPT | Performed by: NURSE PRACTITIONER

## 2024-11-18 PROCEDURE — 25010000002 HEPARIN (PORCINE) PER 1000 UNITS: Performed by: STUDENT IN AN ORGANIZED HEALTH CARE EDUCATION/TRAINING PROGRAM

## 2024-11-18 RX ORDER — PANTOPRAZOLE SODIUM 40 MG/1
40 TABLET, DELAYED RELEASE ORAL 2 TIMES DAILY
Qty: 60 TABLET | Refills: 0
Start: 2024-11-18 | End: 2024-11-22

## 2024-11-18 RX ORDER — INSULIN LISPRO 100 [IU]/ML
7 INJECTION, SOLUTION INTRAVENOUS; SUBCUTANEOUS 4 TIMES DAILY PRN
Qty: 15 ML | Refills: 0 | Status: SHIPPED | OUTPATIENT
Start: 2024-11-18

## 2024-11-18 RX ORDER — LIDOCAINE 4 G/G
2 PATCH TOPICAL
Qty: 15 PATCH | Refills: 0 | Status: SHIPPED | OUTPATIENT
Start: 2024-11-19 | End: 2024-11-27

## 2024-11-18 RX ORDER — LORAZEPAM 0.5 MG/1
0.5 TABLET ORAL EVERY 8 HOURS PRN
Qty: 21 TABLET | Refills: 0 | Status: SHIPPED | OUTPATIENT
Start: 2024-11-18 | End: 2024-11-25

## 2024-11-18 RX ORDER — ACETAMINOPHEN 500 MG
1000 TABLET ORAL EVERY 8 HOURS
Start: 2024-11-18

## 2024-11-18 RX ORDER — ARIPIPRAZOLE 2 MG/1
2 TABLET ORAL 2 TIMES DAILY
Qty: 60 TABLET | Refills: 0 | Status: SHIPPED | OUTPATIENT
Start: 2024-11-18

## 2024-11-18 RX ORDER — OXYCODONE HYDROCHLORIDE 5 MG/1
5 TABLET ORAL EVERY 4 HOURS PRN
Qty: 42 TABLET | Refills: 0 | Status: SHIPPED | OUTPATIENT
Start: 2024-11-18 | End: 2024-11-25

## 2024-11-18 RX ORDER — SEVELAMER CARBONATE 800 MG/1
800 TABLET, FILM COATED ORAL
Qty: 270 TABLET | Refills: 0 | Status: SHIPPED | OUTPATIENT
Start: 2024-11-18

## 2024-11-18 RX ADMIN — LORAZEPAM 0.5 MG: 0.5 TABLET ORAL at 08:53

## 2024-11-18 RX ADMIN — ACETAMINOPHEN 1000 MG: 500 TABLET ORAL at 01:15

## 2024-11-18 RX ADMIN — PANTOPRAZOLE SODIUM 40 MG: 40 INJECTION, POWDER, FOR SOLUTION INTRAVENOUS at 07:11

## 2024-11-18 RX ADMIN — POLYETHYLENE GLYCOL 3350 17 G: 17 POWDER, FOR SOLUTION ORAL at 07:11

## 2024-11-18 RX ADMIN — HEPARIN SODIUM 5000 UNITS: 5000 INJECTION INTRAVENOUS; SUBCUTANEOUS at 15:35

## 2024-11-18 RX ADMIN — OXYCODONE HYDROCHLORIDE 5 MG: 5 SOLUTION ORAL at 01:10

## 2024-11-18 RX ADMIN — VANCOMYCIN HYDROCHLORIDE 500 MG: 500 INJECTION, POWDER, LYOPHILIZED, FOR SOLUTION INTRAVENOUS at 16:32

## 2024-11-18 RX ADMIN — ARFORMOTEROL TARTRATE 15 MCG: 15 SOLUTION RESPIRATORY (INHALATION) at 07:27

## 2024-11-18 RX ADMIN — OXYCODONE HYDROCHLORIDE 5 MG: 5 SOLUTION ORAL at 17:49

## 2024-11-18 RX ADMIN — BUDESONIDE 0.5 MG: 0.5 INHALANT ORAL at 07:27

## 2024-11-18 RX ADMIN — OXYCODONE HYDROCHLORIDE 5 MG: 5 SOLUTION ORAL at 14:00

## 2024-11-18 RX ADMIN — LEVOTHYROXINE SODIUM 175 MCG: 175 TABLET ORAL at 07:10

## 2024-11-18 RX ADMIN — OXYCODONE HYDROCHLORIDE 5 MG: 5 SOLUTION ORAL at 06:48

## 2024-11-18 RX ADMIN — HEPARIN SODIUM 5000 UNITS: 5000 INJECTION INTRAVENOUS; SUBCUTANEOUS at 07:11

## 2024-11-18 NOTE — PROGRESS NOTES
[facility] Progress Note      Patient Name: Nelson Wang  : 1945  MRN: [id number]  Primary Care Physician:  Domingo Bravo MD  Date of admission: 2024    Subjective     Interval History:   F/u ESRD  The patient is awake but confused he is much less restless.  Review of Systems:   Not obtainable    Objective     Vitals:   Temp:  [97.4 °F (36.3 °C)-99.1 °F (37.3 °C)] 97.4 °F (36.3 °C)  Heart Rate:  [79-92] 91  Resp:  [16-20] 16  BP: (109-161)/(62-97) 156/64  Flow (L/min) (Oxygen Therapy):  [2-3] 2    Intake/Output Summary (Last 24 hours) at 2024 1045  Last data filed at 2024 0800  Gross per 24 hour   Intake 340 ml   Output --   Net 340 ml       Physical Exam:    General Appearance: Awake, disoriented and confused not answering question, chronically ill, no acute  Neck: No JVD  Lungs: Bilateral rhonchi, breathing effort not clear  Heart: RRR, no rub  Abdomen: soft, nontender, nondistended  Extremities: no edema, cyanosis or clubbing, he has functional AV fistula in the left upper arm with good thrill and bruit       Scheduled Meds:     acetaminophen, 1,000 mg, Oral, Q8H  arformoterol, 15 mcg, Nebulization, BID - RT  ARIPiprazole, 2 mg, Oral, BID  atorvastatin, 40 mg, Oral, Daily  budesonide, 0.5 mg, Nebulization, BID - RT  Check Fentanyl Patch Placement, 1 each, Not Applicable, Q12H  heparin (porcine), 5,000 Units, Subcutaneous, Q8H  insulin regular, 2-7 Units, Subcutaneous, 4x Daily AC & at Bedtime  levothyroxine, 175 mcg, Oral, Q AM  Lidocaine, 1 patch, Transdermal, Q24H  Lidocaine, 1 patch, Transdermal, Q24H  pantoprazole, 40 mg, Intravenous, BID AC  saccharomyces boulardii, 250 mg, Oral, BID  senna-docusate sodium, 2 tablet, Oral, Daily  sertraline, 100 mg, Oral, Nightly  sevelamer, 800 mg, Oral, TID With Meals  torsemide, 100 mg, Oral, Once per day on   Vancomycin Pharmacy Intermittent/Pulse Dosing, , Not Applicable,  Daily      IV Meds:   Pharmacy to dose vancomycin,         Results Reviewed:   I have personally reviewed the results from the time of this admission to 11/18/2024 10:45 EST     Results from last 7 days   Lab Units 11/18/24 0655 11/17/24 0431 11/16/24 0431   SODIUM mmol/L 137 132* 135*   POTASSIUM mmol/L 4.4 4.0 3.9   CHLORIDE mmol/L 95* 94* 97*   CO2 mmol/L 25.3 23.7 26.1   BUN mg/dL 37* 27* 17   CREATININE mg/dL 6.62* 5.24* 3.57*   CALCIUM mg/dL 9.4 9.1 8.9   GLUCOSE mg/dL 120* 116* 79     Estimated Creatinine Clearance: 8.8 mL/min (A) (by C-G formula based on SCr of 6.62 mg/dL (H)).  Results from last 7 days   Lab Units 11/18/24  0655 11/17/24  0431 11/16/24  0431 11/15/24  0544 11/14/24  0556   MAGNESIUM mg/dL  --   --   --   --  2.0   PHOSPHORUS mg/dL 4.9* 4.0 4.0   < > 6.0*    < > = values in this interval not displayed.         Results from last 7 days   Lab Units 11/18/24  0655 11/17/24  0431 11/16/24  0431 11/15/24  0544 11/14/24  0556   WBC 10*3/mm3 5.13 5.84 5.45 4.77 6.17   HEMOGLOBIN g/dL 8.6* 8.7* 8.2* 7.7* 8.5*   PLATELETS 10*3/mm3 197 204 235 205 218           Assessment / Plan     ASSESSMENT:  End-stage renal disease - On home hemodialysis as an outpatient, though hard to imagine would be able to resume this given degree of confusion/agitation.  Access is LUE AVF.  RIJ TDC been removed.  He had dialysis yesterday without any difficulties, appears euvolemic, sodium is 1327.    Anemia of chronic kidney disease, on long-acting RONALD as an outpatient.  Received 1 unit of PRBCs on 11/11, hemoglobin today 8.6.    Chronic hypotension - BP stable off midodrine  Hypothyroidism, on replacement therapy  Acute encephalopathy - neurology and psychiatry following . Waxes & wanes.  Delirium suspected.  CT head noted, with no acute changes.    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  Copied text in this note has been reviewed and is accurate as of 11/18/24.             Bart Wolf MD  11/18/24  10:45 [facility]    [facility]  [phone]

## 2024-11-18 NOTE — NURSING NOTE
Wheelchair from [place] [facility] has been delivered at this time. BSC will be delivered to patients home. Will continue with discharge as all orders are now complete. Daughter is at bedside for transport.

## 2024-11-18 NOTE — NURSING NOTE
HD treatment completed, Pt tolerated tx well. Throughout tx was Pt constantly moving around and attempting to get out of bed. AVF accessed & de-Accessed without issue. Hemostasis achieved, no excess bleeding noted.    Net volume removed: 843ml  Pre V/S:156/64, 91, 16, 97.4, 67.4kg  Post V/S: 173/95, 83, 16, 97.5, 66.8kg  BVP: 57.2L    Elie Sheehan RN  [facility]

## 2024-11-18 NOTE — SIGNIFICANT NOTE
11/18/24 1006   OTHER   Discipline physical therapist   Rehab Time/Intention   Session Not Performed patient unavailable for treatment  (Dialysis. will follow up as time allows)

## 2024-11-18 NOTE — NURSING NOTE
Per dialysis RN, to administer oral ativan only at this time for dialysis and for sitter to go to dialysis with patient.

## 2024-11-18 NOTE — PROGRESS NOTES
Continued Stay Note  [facility]     Patient Name: Nelson Wang  MRN: [id number]  Today's Date: 11/18/2024    Admit Date: 11/1/2024    Plan: Home with [facility] and OP HD [facility]   Discharge Plan       Row Name 11/18/24 1526       Plan    Plan Home with [facility] and OP HD [facility]    Plan Comments Followed up with pt's family to confirm pt's plan will be to DC home with [facility] and OP HD.  Callplaced to Emily with [facility] to comfirm pt's OP HD plan.  Emily stated that she will need IV Vanc orders to review with Dr. Carr before they can agree to accpet pt back. [facility] was unable to get orders for Nephrology.  [facility] did fax last ID note from 11/4 with Vanc recs and stop date of 12/14/24 along with pharmacy Vanc orders for today.  Per Emily they can accept this and she will plan to review with Dr. Carr 11/19/24 and pt will plan to have OP dialysis at [facility] on Thrusday 11/21/24.   Referral also placed with orders to [place] for wheelchair and bedside commode.  Message sent to Dr. Griffiths about needed documentation for both items to be placed in p'ts DC summary.  Call placed  to [facility]  to inform that pt will DC home today.  Pt's family will transport pt home.                   Discharge Codes    No documentation.                 Expected Discharge Date and Time       Expected Discharge Date Expected Discharge Time    Nov 18, 2024               RACHELLE Jackson

## 2024-11-18 NOTE — DISCHARGE SUMMARY
Patient Name: Nelson Wang  : 1945  MRN: [id number]    Date of Admission: 2024  Date of Discharge:  2024  Primary Care Physician: Domingo Bravo MD      Chief Complaint:   No chief complaint on file.      Discharge Diagnoses     Active Hospital Problems    Diagnosis  POA    **Discitis of lumbar region [M46.46]  Yes    Severe malnutrition [E43]  Yes    Discitis [M46.40]  Yes    Metabolic encephalopathy [G93.41]  Yes    Aspiration pneumonia [J69.0]  Yes    ESRD on dialysis [N18.6, Z99.2]  Not Applicable    Bipolar disorder [F31.9]  Yes    Chronic respiratory failure with hypoxia [J96.11]  Yes    Essential hypertension [I10]  Yes    Anemia due to chronic kidney disease [N18.9, D63.1]  Yes    Type 2 diabetes mellitus without complication [E11.9]  Yes      Resolved Hospital Problems   No resolved problems to display.        Hospital Course     Mr. Wang is a 79 y.o. male with a history of probable mild dementia and history of delirium, end-stage renal disease, chronic respiratory failure with hypoxia, hypertension, type 2 diabetes, anemia of chronic disease, and malnutrition who presented to [facility] initially complaining of back pain and confusion.  Please see the admitting history and physical for further details.  He was found to have bacteremia and discitis and osteomyelitis and was admitted to the hospital for further evaluation and treatment.  He was initially seen at an outside hospital and was transferred for neurosurgical evaluation.  Neurosurgery evaluated the patient here and plans follow-up in 3 months with repeat imaging.  Infectious disease evaluated the patient and is recommended 6 weeks of antibiotics with dialysis.  He has had oropharyngeal dysphagia and did require a core track feeding tube for a while but ultimately this was discontinued and he is now tolerating a diet.  He had difficulty with dialysis due to his delirium and at this point is requiring  Ativan prior to dialysis in order to keep him from being so agitated during the procedure.  Pain is minimized another issue for him and his back he is tolerating the pain medications well and has a primary care follow-up in 7 days with enough medication being prescribed to get him to that visit.  His delirium has been the biggest steph to getting out of the hospital.  It is fluctuated quite a bit through the hospitalization at times he can be fairly lucid and have appropriate conversations but the majority of the time he is disoriented and even other times he is completely somnolent lethargic and minimally responsive.  An MRI of the brain was done as well as an EEG with no structural abnormalities within the brain and no seizure activity noted.  Neurology did evaluate the patient and feels his symptoms and issues are related to delirium.  The hope is that with ongoing treatment his mental status improves and he recovers enough at home.  We did entertain the idea of possibly going to a skilled nursing facility with dialysis but the fear is that this would cause further changes and hindrance in his delirium and make it difficult for him to improved to baseline. His overall prognosis remains pretty guarded at this point.  It is unclear if he will return to or even improve his overall cognitive function.  Only time will tell at this point.  Will need to get him out of the hospital continue to treat his infection and move forward in that direction in order to see if things recover.  I talked extensively with his daughter and his wife about the issues.  They are all in agreement that the best thing for him at this point is to at least try and make things work at home.  She has hired private caregivers to help at times that the delirium becomes bad she plans to do dialysis in the outpatient dialysis center in a separate room in order to try to keep him calm and allow him to get through dialysis sessions.  I have asked  that he follow-up with his primary care physician and his nephrologist and other subspecialist closely in the outpatient setting following discharge.    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 Because of the previous mentioned issues, the patient has a mobility limitation that significantly impairs them to accomplish their MRADL entirely in the home. Mobility limitation cannot be resolved by using a cane or walker because she her pain and partial weightbearing on lower extremities. The patient's functional mobility deficit will be resolved with the use of a wheelchair. The patient is planning to use the wheelchair on a regular basis in the home and has not expressed an unwillingness to do so. The patient can safely use a manual wheelchair and has the strength to maneuver the wheelchair independently. Patient is physically incapable of accessing and or utilizing regular toilet facilities safely and independently within the home and will require a bedside commode. Patient is confined to a single room.     Day of Discharge     Subjective:  Doing well the AM of discharge on his way to dialysis    Physical Exam:  Temp:  [97.4 °F (36.3 °C)-99.1 °F (37.3 °C)] 97.4 °F (36.3 °C)  Heart Rate:  [79-92] 91  Resp:  [16-20] 16  BP: (109-161)/(62-97) 156/64  Body mass index is 24.35 kg/m².  Physical Exam  Vitals reviewed.   Constitutional:       General: He is not in acute distress.     Appearance: He is ill-appearing.   Cardiovascular:      Rate and Rhythm: Normal rate and regular rhythm.   Pulmonary:      Effort: Pulmonary effort is normal. No respiratory distress.   Abdominal:      General: Bowel sounds are normal. There is no distension.      Palpations: Abdomen is soft.   Neurological:      Mental Status: He is alert. He is disoriented.      Comments: Moving arms and legs       Consultants     Consult Orders (all) (From  admission, onward)       Start     Ordered    11/12/24 1410  Inpatient Neurology Consult General  Once        Specialty:  Neurology  Provider:  Jose Finney MD    11/12/24 1409    11/03/24 0937  Inpatient Vascular Surgery Consult  Once        Specialty:  Vascular Surgery  Provider:  Marquez Zaman MD    11/03/24 0937    11/02/24 1631  Inpatient Consult to Nutrition Services  Once        Provider:  (Not yet assigned)    11/02/24 1630    11/01/24 1031  Inpatient Palliative Care Team Consult  Once        Provider:  (Not yet assigned)    11/01/24 1030    11/01/24 0702  Inpatient Nephrology Consult  IN AM        Specialty:  Nephrology  Provider:  Rolf Cha MD    11/01/24 0206    11/01/24 0702  Inpatient Neurosurgery Consult  IN AM        Specialty:  Neurosurgery  Provider:  Candace Das MD    11/01/24 0206    11/01/24 0702  Inpatient Psychiatrist Consult  IN AM        Specialty:  Psychiatry  Provider:  Guy Dai III, MD    11/01/24 0206 11/01/24 0643  Inpatient Infectious Diseases Consult  Once        Specialty:  Infectious Diseases  Provider:  Adam Bunch MD    11/01/24 0643                  Procedures     * Surgery not found *    Imaging Results (All)       Procedure Component Value Units Date/Time    MRI Brain Without Contrast [[id number]] Collected: 11/16/24 0157     Updated: 11/16/24 0157    Narrative:        Patient: KAYLA CACERES  Time Out: 01:56  Exam(s): MRI HEAD Without Contrast     EXAM:    MR Head Without Intravenous Contrast    CLINICAL HISTORY:     Reason for exam: abnormal neuro exam.    TECHNIQUE:    Magnetic resonance images of the head brain without intravenous   contrast in multiple planes.    COMPARISON:    No relevant prior studies available.    FINDINGS:    Brain:  No mass.  No acute hemorrhage.  No restricted diffusion.    Ventricles:  No hydrocephalus.    Bones joints:  Unremarkable.    Sinuses:  No acute sinusitis.    Mastoid air  cells: Moderate right and mild left mastoid effusion.    Orbits:  Unremarkable.    IMPRESSION:         No acute infarct, hemorrhage, or hydrocephalus.      Impression:          Electronically signed by Tati Jessica MD on 11-16-24 at 0156    CT Head Without Contrast [[id number]] Collected: 11/12/24 1035     Updated: 11/13/24 0620    Narrative:      STAT CT SCAN OF THE HEAD WITHOUT CONTRAST ON 11/12/2024     CLINICAL HISTORY: This is a 79-year-old male patient with a history of  end-stage renal disease. The patient has acute altered mental status and  is combative and agitated and has confusion.     TECHNIQUE: Spiral CT images were obtained from the base of the skull to  the vertex without intravenous contrast. Due to motion degradation of  the images, repeat axial CT sections were obtained through the posterior  cranial fossa. All of the images were reformatted and are submitted in 3  mm thick axial, sagittal and coronal CT sections with brain algorithm.     This is correlated to a prior head CT from [facility] on  10/08/2024.     FINDINGS: There is some mild patchy low-density in the periventricular  white matter of the cerebral hemispheres consistent with mild small  vessel disease. The remainder of the brain parenchyma is normal in  attenuation. The ventricles are normal in size. I see no focal mass  effect. There is no midline shift. No extra-axial fluid collections are  identified and there is no evidence of acute intracranial hemorrhage.  There is mild cerebral atrophy. The calvarium and the skull base are  normal in appearance. The paranasal sinuses and the mastoid air cells  and the middle ear cavities are clear. There are lens implants in place  in the globes from previous bilateral cataract surgery. Otherwise, the  orbits are unremarkable.       Impression:      1. No acute intracranial abnormality is identified with no significant  change when compared to this patient's prior head CT on  10/08/2024.  2. There is mild small vessel disease in the cerebral white matter and  mild cerebral atrophy. There are lens implants in place in the globes  from previous bilateral cataract surgery. The remainder of the head CT  is normal with no discernible acute completed infarct and no  intracranial hemorrhage identified and the etiology of the patient's  altered mental status, confusion and agitation is not established on  this exam. If clinical symptoms warrant, an MRI of the brain could be  obtained for a more comprehensive assessment.     Radiation dose reduction techniques were utilized, including automated  exposure control and exposure modulation based on body size.           This report was finalized on 11/13/2024 6:17 AM by Dr. Cristian Ordoñez M.D  on Workstation: [id number]       CT Chest Without Contrast Diagnostic [[id number]] Collected: 11/12/24 1043     Updated: 11/12/24 1054    Narrative:      CT CHEST WO CONTRAST DIAGNOSTIC-     DATE OF EXAM: 11/12/2024 9:10 AM     INDICATION: aspiration and change in lung exam.     COMPARISON: MRI thoracic spine 11/2/2024. CT chest 10/31/2024,  10/8/2024, 9/28/2024, and 8/23/2024.     TECHNIQUE: Multiple contiguous axial images were acquired through the  chest without the intravenous administration of contrast. Reformatted  coronal and sagittal sequences were also reviewed. Radiation dose  reduction techniques were utilized, including automated exposure control  and exposure modulation based on body size.     FINDINGS:  Study is limited by motion artifact. Imaging was performed with the  patient's arms across the chest.     Enlarged small to moderate right pleural effusion with associated  compressive atelectasis of the right lung. New trace left pleural  effusion predominantly dependent groundglass opacities and interstitial  thickening in both lungs, which could reflect atelectasis, pulmonary  edema, or atypical pneumonia. The central airways are widely patent.  No  pneumothorax.     Stable cardiomegaly. Severe calcified coronary artery disease. Trace  pericardial fluid. Aortic valve calcifications and calcified  atherosclerotic disease in the thoracic aorta without aneurysm. Stable  enlargement of the main pulmonary artery, which could reflect pulmonary  arterial hypertension. No pathologically enlarged intrathoracic lymph  nodes are identified.     Limited noncontrast CT imaging of the upper abdomen demonstrates the  enteric tube curled within the gastric fundus.     Bilateral gynecomastia. Lucent destructive changes at the T5 inferior  endplate and T6 superior endplate, correlating with the known T5-T6  discitis and osteomyelitis better appreciated on the prior MR study.  Multiple additional chronic lower thoracic endplate deformities.  Partially imaged chronic degenerative changes in both shoulders. The  upper extremities are partially included in the field-of-view but not  adequately evaluated. Multiple old healed bilateral anterior rib  fracture deformities.       Impression:         1. Enlarged small to moderate right pleural effusion and new trace left  pleural effusion with associated compressive atelectasis of the right  lung. Dependent groundglass opacities and interstitial thickening in  each lung could reflect atelectasis, mild pulmonary edema, or atypical  pneumonia.  2. Stable cardiomegaly with severe calcified coronary artery disease and  stable enlargement the main pulmonary artery that could reflect  pulmonary arterial hypertension.  3. Lucent destructive endplate changes at T5-T6 consistent with discitis  and osteomyelitis.     This report was finalized on 11/12/2024 10:51 AM by Chencho Casanova MD on  Workstation: [id number]       FL Video Swallow Single Contrast [[id number]] Collected: 11/08/24 1334     Updated: 11/08/24 1339    Narrative:      VIDEO SWALLOWING EXAMINATION BY SPEECH PATHOLOGY     Clinical: Dysphasia     Video swallowing examination  performed under the direction of speech  pathology. Imaging reviewed by radiologist who concurs with the  findings.     Speech pathology summary:  VFSS completed with Kelsey PENA. Pt  needed consistent cues to participate and take trials. Pt exhibited mild  oropharyngeal dysphagia characterized by lingual weakness impacted by  cognition. Pt took trials of honey thick (cup), nectar thick (cup), thin  (cup/straw), pureed, soft, mixed, and regular solids. No penetration or  aspiration was observed. Mastication and oral transfer was disorganized  and slow.         FLUOROSCOPY TIME: 3 minutes 4 seconds, 4432 images.     This report was finalized on 11/8/2024 1:35 PM by Dr. Edilberto Ann M.D  on Workstation: [id number]       MRI Thoracic Spine With & Without Contrast [[id number]] Collected: 11/02/24 1338     Updated: 11/02/24 1410    Narrative:      MRI CERVICAL SPINE WO CONTRAST-, MRI THORACIC SPINE W WO CONTRAST-, MRI  LUMBAR SPINE W WO CONTRAST-     HISTORY:  possible discitis/osteomyilitis     COMPARISON: MRI examination of the thoracic and lumbar spine 9/29/2024  CT chest 10/8/2024 and 10/31/2024     MRI CERVICAL SPINE WITHOUT CONTRAST:     The study is hampered by patient motion. The lack of contrast reduces  the sensitivity of the study.     Signal intensity involving the cervical cord is grossly normal on the  axial T2 sequence. No convincing T2 hyperintensity is appreciated  involving the cervical discs on the motion degraded sagittal T2  sequence. There is moderate loss of disc height at C3-4, C5-6 and C6-7.  Signal loss is appreciated on the sagittal proton density sequence  extending from the base of the dens to C3-4 suggesting ossification of  posterior longitudinal ligament.     C2-3: Disc osteophyte complex is present which is more prominent on the  left. Abuts the ventral surface of the cord slightly.     C3-4: A small central disc osteophyte complex is present which mildly  abuts the ventral surface  of the cord, slight more prominent to the  right. Mild foraminal stenosis is present on the right secondary to  uncovertebral degenerative disease.     C4-5: A small central disc osteophyte complex is present which is  slightly more prominent to the right.     C5-6: A small central disc osteophyte complex is present resulting in  mild flattening of ventral surface of the thecal sac. Slightly more  prominent to the right. Moderate foraminal stenosis is present on the  right secondary to uncovertebral degenerative disease.     C6/C7: A minimal central disc osteophyte complex is present.     C7-T1: There is no evidence of a disc bulge or herniation.       Impression:      The study is significantly degraded by patient motion.  Additionally, the lack of contrast reduces the sensitivity of the study.  However, no gross evidence of discitis or osteomyelitis is appreciated.  There is no evidence of gross cord signal abnormality or of cord  compression. Multilevel degenerative disease involving the cervical  spine is noted as described in detail above including what is likely  ossification of posterior longitudinal ligament extending from C2 to  C3/4. See above.        MRI EXAMINATION OF THE THORACIC SPINE WITH AND WITHOUT CONTRAST:     The study is degraded by patient motion.     There is signal loss appreciated involving the T5 and T6 vertebral  bodies on the sagittal T1 sequence. On the sagittal T2 sequence there is  increased signal intensity involving the disc space at T5/T6 and to a  lesser extent the T5 and T6 vertebral bodies. There is adjacent  edema/inflammation also evident extending from T4-T7. These findings are  new versus 9/29/2024. The CT examination of the chest from 10/31/2024  demonstrates new endplate irregularity as compared to the CT examination  of the chest performed on 10/8/2024. The findings are all consistent  with discitis/osteomyelitis. There is moderate loss of disc height and  partial fusion  at T11/T11/T12. A mild compression deformity involving  T12 is noted, unchanged versus 9/29/2024.     There is no evidence of cord signal abnormality on the sagittal or axial  T2 sequences.     After contrast administration there was enhancement of the T5 and T6  vertebral bodies. Epidural enhancement is also appreciated posterior to  the T5 and T6 vertebral bodies. No convincing epidural abscess is  identified. There is enhancing soft tissue appreciated extending into  the neural foramen at T5/T6 bilaterally, more prominent on the right.     IMPRESSION: The study is degraded by patient motion however discitis is  appreciated at T5/T6 with osteomyelitis present involving the T5 and T6  vertebral bodies. Paraspinal edema and enhancement consistent with  infection/inflammatory change is appreciated as described above and  there is also epidural enhancement posterior to the T5 and T6 vertebral  bodies. Enhancement extends into the neural foramen bilaterally at  T5/T6, more prominent on the right. There is no evidence of cord  compression or of gross cord signal abnormality.        MRI EXAMINATION OF THE LUMBAR SPINE WITH AND WITHOUT CONTRAST:     The study is degraded by patient motion.     There is a mild compression deformity involving the T12 and L3 without  evidence of marrow edema. There is mild bony retropulsion of the  superior endplate of T12 and mild to moderate retropulsion involving the  superior aspect of L3, unchanged. The sagittal T2 sequence demonstrates  a mild degree of increased signal intensity involving the disc at L1-L2  which is new versus 9/29/2024. There is a mild degree of increased  signal intensity involving the disc at L5-S1 also new versus prior  examination. There is partial visualization of a plane of T2  hyperintensity anterior to the sacrum on the sagittal T2 sequence. The  conus is at L1 and the caudal aspect the spinal cord appears  unremarkable.     L1-L2: A mild central broad-based  disc osteophyte complex is present  which is more prominent to the left. Mild facet degenerative disease is  present bilaterally.      L2-L3: A mild central broad-based disc osteophyte complex is present  resulting in mild flattening of the ventral surface of the thecal sac.  Mild foraminal stenosis is present bilaterally secondary to extension of  a small disc osteophyte complex into the neural foramen.     L3-L4: A small central disc osteophyte complex is present with zones of  herniation. Mild facet degenerative disease is present bilaterally. Mild  and mild to moderate foraminal stenosis is present on the right and left  respectively secondary to extension of a small disc osteophyte complex  into the neural foramen.     L4-L5: Mild to moderate facet degenerative disease is present  bilaterally. Mild central broad-based disc osteophyte complex is present  which results in mild flattening of the ventral surface of the thecal  sac. Mild to moderate lateral recess narrowing is present bilaterally.  Mild foraminal stenosis is present bilaterally, more prominent on the  right secondary to extension of a small disc osteophyte complex into the  neural foramen.     L5-S1: Moderate facet degenerative disease is present bilaterally. A  broad-based disc osteophyte complex is present which is more prominent  to the right. There is moderate to severe lateral recess narrowing on  the right similar appearances compared to prior examination. Moderate to  severe foraminal stenosis is also present bilaterally secondary to loss  of disc height and extension of the disc osteophyte complex into the  neural foramen, similar in appearance as compared to the prior  examination. After contrast administration there was enhancement of the  disc at L1-L2. There is mild enhancement involving the disc at L5-S1.     IMPRESSION:  1.  There is new mild T2 hyperintensity involving the disc at the level  of L1-L2 as compared to the MRI examination  of the lumbar spine  performed on 9/29/2024. There is also milder T2 hyperintensity and  enhancement at L5-S1 which is also new versus a prior MRI examination.  The findings are suspicious for mild discitis were partially treated  discitis. There is no evidence of an epidural abscess. Clinical  correlation and a follow-up MRI examination of the lumbar spine is  recommended.  2.  Mild compression deformities involving T12 and L3 are appreciated  with bony retropulsion, unchanged. There is no evidence of a new  compression fracture.  3.  Multilevel degenerative disease involving lumbar spine is noted as  described above. The study is hampered by patient motion but there is no  evidence of a focal herniation. This includes moderate to severe lateral  recess narrowing to the right at L5-S1 and moderate to severe foraminal  stenosis bilaterally at L5-S1. See above.  4.  There is a thin plane of edema and enhancement anterior to the  sacrum. This is only partially visualized. Further evaluation could be  performed with a MRI examination of the pelvis with and without  contrast.        This report was finalized on 11/2/2024 2:06 PM by Dr. Donovan Richey M.D  on Workstation: [id number]       MRI Lumbar Spine With & Without Contrast [[id number]] Collected: 11/02/24 1338     Updated: 11/02/24 1410    Narrative:      MRI CERVICAL SPINE WO CONTRAST-, MRI THORACIC SPINE W WO CONTRAST-, MRI  LUMBAR SPINE W WO CONTRAST-     HISTORY:  possible discitis/osteomyilitis     COMPARISON: MRI examination of the thoracic and lumbar spine 9/29/2024  CT chest 10/8/2024 and 10/31/2024     MRI CERVICAL SPINE WITHOUT CONTRAST:     The study is hampered by patient motion. The lack of contrast reduces  the sensitivity of the study.     Signal intensity involving the cervical cord is grossly normal on the  axial T2 sequence. No convincing T2 hyperintensity is appreciated  involving the cervical discs on the motion degraded sagittal T2  sequence.  There is moderate loss of disc height at C3-4, C5-6 and C6-7.  Signal loss is appreciated on the sagittal proton density sequence  extending from the base of the dens to C3-4 suggesting ossification of  posterior longitudinal ligament.     C2-3: Disc osteophyte complex is present which is more prominent on the  left. Abuts the ventral surface of the cord slightly.     C3-4: A small central disc osteophyte complex is present which mildly  abuts the ventral surface of the cord, slight more prominent to the  right. Mild foraminal stenosis is present on the right secondary to  uncovertebral degenerative disease.     C4-5: A small central disc osteophyte complex is present which is  slightly more prominent to the right.     C5-6: A small central disc osteophyte complex is present resulting in  mild flattening of ventral surface of the thecal sac. Slightly more  prominent to the right. Moderate foraminal stenosis is present on the  right secondary to uncovertebral degenerative disease.     C6/C7: A minimal central disc osteophyte complex is present.     C7-T1: There is no evidence of a disc bulge or herniation.       Impression:      The study is significantly degraded by patient motion.  Additionally, the lack of contrast reduces the sensitivity of the study.  However, no gross evidence of discitis or osteomyelitis is appreciated.  There is no evidence of gross cord signal abnormality or of cord  compression. Multilevel degenerative disease involving the cervical  spine is noted as described in detail above including what is likely  ossification of posterior longitudinal ligament extending from C2 to  C3/4. See above.        MRI EXAMINATION OF THE THORACIC SPINE WITH AND WITHOUT CONTRAST:     The study is degraded by patient motion.     There is signal loss appreciated involving the T5 and T6 vertebral  bodies on the sagittal T1 sequence. On the sagittal T2 sequence there is  increased signal intensity involving the disc  space at T5/T6 and to a  lesser extent the T5 and T6 vertebral bodies. There is adjacent  edema/inflammation also evident extending from T4-T7. These findings are  new versus 9/29/2024. The CT examination of the chest from 10/31/2024  demonstrates new endplate irregularity as compared to the CT examination  of the chest performed on 10/8/2024. The findings are all consistent  with discitis/osteomyelitis. There is moderate loss of disc height and  partial fusion at T11/T11/T12. A mild compression deformity involving  T12 is noted, unchanged versus 9/29/2024.     There is no evidence of cord signal abnormality on the sagittal or axial  T2 sequences.     After contrast administration there was enhancement of the T5 and T6  vertebral bodies. Epidural enhancement is also appreciated posterior to  the T5 and T6 vertebral bodies. No convincing epidural abscess is  identified. There is enhancing soft tissue appreciated extending into  the neural foramen at T5/T6 bilaterally, more prominent on the right.     IMPRESSION: The study is degraded by patient motion however discitis is  appreciated at T5/T6 with osteomyelitis present involving the T5 and T6  vertebral bodies. Paraspinal edema and enhancement consistent with  infection/inflammatory change is appreciated as described above and  there is also epidural enhancement posterior to the T5 and T6 vertebral  bodies. Enhancement extends into the neural foramen bilaterally at  T5/T6, more prominent on the right. There is no evidence of cord  compression or of gross cord signal abnormality.        MRI EXAMINATION OF THE LUMBAR SPINE WITH AND WITHOUT CONTRAST:     The study is degraded by patient motion.     There is a mild compression deformity involving the T12 and L3 without  evidence of marrow edema. There is mild bony retropulsion of the  superior endplate of T12 and mild to moderate retropulsion involving the  superior aspect of L3, unchanged. The sagittal T2 sequence  demonstrates  a mild degree of increased signal intensity involving the disc at L1-L2  which is new versus 9/29/2024. There is a mild degree of increased  signal intensity involving the disc at L5-S1 also new versus prior  examination. There is partial visualization of a plane of T2  hyperintensity anterior to the sacrum on the sagittal T2 sequence. The  conus is at L1 and the caudal aspect the spinal cord appears  unremarkable.     L1-L2: A mild central broad-based disc osteophyte complex is present  which is more prominent to the left. Mild facet degenerative disease is  present bilaterally.      L2-L3: A mild central broad-based disc osteophyte complex is present  resulting in mild flattening of the ventral surface of the thecal sac.  Mild foraminal stenosis is present bilaterally secondary to extension of  a small disc osteophyte complex into the neural foramen.     L3-L4: A small central disc osteophyte complex is present with zones of  herniation. Mild facet degenerative disease is present bilaterally. Mild  and mild to moderate foraminal stenosis is present on the right and left  respectively secondary to extension of a small disc osteophyte complex  into the neural foramen.     L4-L5: Mild to moderate facet degenerative disease is present  bilaterally. Mild central broad-based disc osteophyte complex is present  which results in mild flattening of the ventral surface of the thecal  sac. Mild to moderate lateral recess narrowing is present bilaterally.  Mild foraminal stenosis is present bilaterally, more prominent on the  right secondary to extension of a small disc osteophyte complex into the  neural foramen.     L5-S1: Moderate facet degenerative disease is present bilaterally. A  broad-based disc osteophyte complex is present which is more prominent  to the right. There is moderate to severe lateral recess narrowing on  the right similar appearances compared to prior examination. Moderate to  severe  foraminal stenosis is also present bilaterally secondary to loss  of disc height and extension of the disc osteophyte complex into the  neural foramen, similar in appearance as compared to the prior  examination. After contrast administration there was enhancement of the  disc at L1-L2. There is mild enhancement involving the disc at L5-S1.     IMPRESSION:  1.  There is new mild T2 hyperintensity involving the disc at the level  of L1-L2 as compared to the MRI examination of the lumbar spine  performed on 9/29/2024. There is also milder T2 hyperintensity and  enhancement at L5-S1 which is also new versus a prior MRI examination.  The findings are suspicious for mild discitis were partially treated  discitis. There is no evidence of an epidural abscess. Clinical  correlation and a follow-up MRI examination of the lumbar spine is  recommended.  2.  Mild compression deformities involving T12 and L3 are appreciated  with bony retropulsion, unchanged. There is no evidence of a new  compression fracture.  3.  Multilevel degenerative disease involving lumbar spine is noted as  described above. The study is hampered by patient motion but there is no  evidence of a focal herniation. This includes moderate to severe lateral  recess narrowing to the right at L5-S1 and moderate to severe foraminal  stenosis bilaterally at L5-S1. See above.  4.  There is a thin plane of edema and enhancement anterior to the  sacrum. This is only partially visualized. Further evaluation could be  performed with a MRI examination of the pelvis with and without  contrast.        This report was finalized on 11/2/2024 2:06 PM by Dr. Donovan Richey M.D  on Workstation: [id number]       MRI Cervical Spine Without Contrast [[id number]] Collected: 11/02/24 1338     Updated: 11/02/24 1410    Narrative:      MRI CERVICAL SPINE WO CONTRAST-, MRI THORACIC SPINE W WO CONTRAST-, MRI  LUMBAR SPINE W WO CONTRAST-     HISTORY:  possible discitis/osteomyilitis      COMPARISON: MRI examination of the thoracic and lumbar spine 9/29/2024  CT chest 10/8/2024 and 10/31/2024     MRI CERVICAL SPINE WITHOUT CONTRAST:     The study is hampered by patient motion. The lack of contrast reduces  the sensitivity of the study.     Signal intensity involving the cervical cord is grossly normal on the  axial T2 sequence. No convincing T2 hyperintensity is appreciated  involving the cervical discs on the motion degraded sagittal T2  sequence. There is moderate loss of disc height at C3-4, C5-6 and C6-7.  Signal loss is appreciated on the sagittal proton density sequence  extending from the base of the dens to C3-4 suggesting ossification of  posterior longitudinal ligament.     C2-3: Disc osteophyte complex is present which is more prominent on the  left. Abuts the ventral surface of the cord slightly.     C3-4: A small central disc osteophyte complex is present which mildly  abuts the ventral surface of the cord, slight more prominent to the  right. Mild foraminal stenosis is present on the right secondary to  uncovertebral degenerative disease.     C4-5: A small central disc osteophyte complex is present which is  slightly more prominent to the right.     C5-6: A small central disc osteophyte complex is present resulting in  mild flattening of ventral surface of the thecal sac. Slightly more  prominent to the right. Moderate foraminal stenosis is present on the  right secondary to uncovertebral degenerative disease.     C6/C7: A minimal central disc osteophyte complex is present.     C7-T1: There is no evidence of a disc bulge or herniation.       Impression:      The study is significantly degraded by patient motion.  Additionally, the lack of contrast reduces the sensitivity of the study.  However, no gross evidence of discitis or osteomyelitis is appreciated.  There is no evidence of gross cord signal abnormality or of cord  compression. 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 See above.        MRI EXAMINATION OF THE THORACIC SPINE WITH AND WITHOUT CONTRAST:     The study is degraded by patient motion.     There is signal loss appreciated involving the T5 and T6 vertebral  bodies on the sagittal T1 sequence. On the sagittal T2 sequence there is  increased signal intensity involving the disc space at T5/T6 and to a  lesser extent the T5 and T6 vertebral bodies. There is adjacent  edema/inflammation also evident extending from T4-T7. These findings are  new versus 9/29/2024. The CT examination of the chest from 10/31/2024  demonstrates new endplate irregularity as compared to the CT examination  of the chest performed on 10/8/2024. The findings are all consistent  with discitis/osteomyelitis. There is moderate loss of disc height and  partial fusion at T11/T11/T12. A mild compression deformity involving  T12 is noted, unchanged versus 9/29/2024.     There is no evidence of cord signal abnormality on the sagittal or axial  T2 sequences.     After contrast administration there was enhancement of the T5 and T6  vertebral bodies. Epidural enhancement is also appreciated posterior to  the T5 and T6 vertebral bodies. No convincing epidural abscess is  identified. There is enhancing soft tissue appreciated extending into  the neural foramen at T5/T6 bilaterally, more prominent on the right.     IMPRESSION: The study is degraded by patient motion however discitis is  appreciated at T5/T6 with osteomyelitis present involving the T5 and T6  vertebral bodies. Paraspinal edema and enhancement consistent with  infection/inflammatory change is appreciated as described above and  there is also epidural enhancement posterior to the T5 and T6 vertebral  bodies. Enhancement extends into the neural foramen bilaterally at  T5/T6, more prominent on the  right. There is no evidence of cord  compression or of gross cord signal abnormality.        MRI EXAMINATION OF THE LUMBAR SPINE WITH AND WITHOUT CONTRAST:     The study is degraded by patient motion.     There is a mild compression deformity involving the T12 and L3 without  evidence of marrow edema. There is mild bony retropulsion of the  superior endplate of T12 and mild to moderate retropulsion involving the  superior aspect of L3, unchanged. The sagittal T2 sequence demonstrates  a mild degree of increased signal intensity involving the disc at L1-L2  which is new versus 9/29/2024. There is a mild degree of increased  signal intensity involving the disc at L5-S1 also new versus prior  examination. There is partial visualization of a plane of T2  hyperintensity anterior to the sacrum on the sagittal T2 sequence. The  conus is at L1 and the caudal aspect the spinal cord appears  unremarkable.     L1-L2: A mild central broad-based disc osteophyte complex is present  which is more prominent to the left. Mild facet degenerative disease is  present bilaterally.      L2-L3: A mild central broad-based disc osteophyte complex is present  resulting in mild flattening of the ventral surface of the thecal sac.  Mild foraminal stenosis is present bilaterally secondary to extension of  a small disc osteophyte complex into the neural foramen.     L3-L4: A small central disc osteophyte complex is present with zones of  herniation. Mild facet degenerative disease is present bilaterally. Mild  and mild to moderate foraminal stenosis is present on the right and left  respectively secondary to extension of a small disc osteophyte complex  into the neural foramen.     L4-L5: Mild to moderate facet degenerative disease is present  bilaterally. Mild central broad-based disc osteophyte complex is present  which results in mild flattening of the ventral surface of the thecal  sac. Mild to moderate lateral recess narrowing is present  bilaterally.  Mild foraminal stenosis is present bilaterally, more prominent on the  right secondary to extension of a small disc osteophyte complex into the  neural foramen.     L5-S1: Moderate facet degenerative disease is present bilaterally. A  broad-based disc osteophyte complex is present which is more prominent  to the right. There is moderate to severe lateral recess narrowing on  the right similar appearances compared to prior examination. Moderate to  severe foraminal stenosis is also present bilaterally secondary to loss  of disc height and extension of the disc osteophyte complex into the  neural foramen, similar in appearance as compared to the prior  examination. After contrast administration there was enhancement of the  disc at L1-L2. There is mild enhancement involving the disc at L5-S1.     IMPRESSION:  1.  There is new mild T2 hyperintensity involving the disc at the level  of L1-L2 as compared to the MRI examination of the lumbar spine  performed on 9/29/2024. There is also milder T2 hyperintensity and  enhancement at L5-S1 which is also new versus a prior MRI examination.  The findings are suspicious for mild discitis were partially treated  discitis. There is no evidence of an epidural abscess. Clinical  correlation and a follow-up MRI examination of the lumbar spine is  recommended.  2.  Mild compression deformities involving T12 and L3 are appreciated  with bony retropulsion, unchanged. There is no evidence of a new  compression fracture.  3.  Multilevel degenerative disease involving lumbar spine is noted as  described above. The study is hampered by patient motion but there is no  evidence of a focal herniation. This includes moderate to severe lateral  recess narrowing to the right at L5-S1 and moderate to severe foraminal  stenosis bilaterally at L5-S1. See above.  4.  There is a thin plane of edema and enhancement anterior to the  sacrum. This is only partially visualized. Further  evaluation could be  performed with a MRI examination of the pelvis with and without  contrast.        This report was finalized on 11/2/2024 2:06 PM by Dr. Donovan Richey M.D  on Workstation: [id number]             Results for orders placed during the hospital encounter of 11/01/24    Duplex Hemodialysis Access CAR    Interpretation Summary    Patent left arteriovenous fistula with normal volume flows.  Moderate arterial anastomotic and proximal inflow stenosis with chronic septum noted.  Otherwise widely patent fistula.    Results for orders placed during the hospital encounter of 11/01/24    Adult Transthoracic Echo Complete W/ Cont if Necessary Per Protocol    Interpretation Summary    Left ventricular systolic function is normal. Left ventricular ejection fraction appears to be 56 - 60%.    Left ventricular diastolic function was indeterminate.    The right ventricular cavity is mildly dilated. Normal right ventricular systolic function noted.    : The left atrial cavity is mildly dilated.    The aortic valve leaflets are mildly to moderately calcified    There is moderate mitral annular calcification    Insufficient TR velocity profile to estimate the right ventricular systolic pressure.    There is no evidence of pericardial effusion    Pertinent Labs     Results from last 7 days   Lab Units 11/18/24  0655 11/17/24  0431 11/16/24  0431 11/15/24  0544   WBC 10*3/mm3 5.13 5.84 5.45 4.77   HEMOGLOBIN g/dL 8.6* 8.7* 8.2* 7.7*   PLATELETS 10*3/mm3 197 204 235 205     Results from last 7 days   Lab Units 11/18/24 0655 11/17/24 0431 11/16/24  0431 11/15/24  0544   SODIUM mmol/L 137 132* 135* 133*   POTASSIUM mmol/L 4.4 4.0 3.9 4.4   CHLORIDE mmol/L 95* 94* 97* 96*   CO2 mmol/L 25.3 23.7 26.1 21.1*   BUN mg/dL 37* 27* 17 38*   CREATININE mg/dL 6.62* 5.24* 3.57* 5.67*   GLUCOSE mg/dL 120* 116* 79 90   EGFR mL/min/1.73 8.0* 10.6* 16.7* 9.6*     Results from last 7 days   Lab Units 11/18/24 0655 11/17/24 0431  "11/16/24  0431 11/15/24  0544   ALBUMIN g/dL 3.4* 3.1* 2.9* 3.0*     Results from last 7 days   Lab Units 11/18/24  0655 11/17/24  0431 11/16/24  0431 11/15/24  0544 11/14/24  0556   CALCIUM mg/dL 9.4 9.1 8.9 8.7 9.1   ALBUMIN g/dL 3.4* 3.1* 2.9* 3.0* 3.2*   MAGNESIUM mg/dL  --   --   --   --  2.0   PHOSPHORUS mg/dL 4.9* 4.0 4.0 6.0* 6.0*               Invalid input(s): \"LDLCALC\"          Test Results Pending at Discharge     Pending Results       None              Discharge Details        Discharge Medications        New Medications        Instructions Start Date   acetaminophen 500 MG tablet  Commonly known as: TYLENOL   1,000 mg, Oral, Every 8 Hours      ARIPiprazole 2 MG tablet  Commonly known as: ABILIFY   2 mg, Oral, 2 Times Daily      Insulin Lispro (1 Unit Dial) 100 UNIT/ML solution pen-injector  Commonly known as: HumaLOG KwikPen   Take 4 times daily as needed  Blood Glucose 150-199 mg/dL - 2 units Blood Glucose 200-249 mg/dL - 3 units Blood Glucose 250-299 mg/dL - 4 units Blood Glucose 300-349 mg/dL - 5 units Blood Glucose 350-400 mg/dL - 6 units Blood Glucose Greater Than 400 mg/dL - 7 units & Call Provider      Lidocaine 4 %   2 patches, Transdermal, Every 24 Hours Scheduled, Remove & Discard patch within 12 hours or as directed by MD   Start Date: November 19, 2024     LORazepam 0.5 MG tablet  Commonly known as: ATIVAN   0.5 mg, Oral, Every 8 Hours PRN      naloxone 4 MG/0.1ML nasal spray  Commonly known as: NARCAN   Call 911. Don't prime. [place] in 1 nostril for overdose. Repeat in 2-3 minutes in other nostril if no or minimal breathing/responsiveness.      oxyCODONE 5 MG immediate release tablet  Commonly known as: Roxicodone   5 mg, Oral, Every 4 Hours PRN      sevelamer 0.8 g pack packet  Commonly known as: RENVELA  Replaces: sevelamer 800 MG tablet   0.8 g, Oral, 3 Times Daily With Meals      VANCOMYCIN PHARMACY INTERMITTENT/PULSE DOSING   Not Applicable, Daily   Start Date: November 19, 2024        "     Continue These Medications        Instructions Start Date   allopurinol 300 MG tablet  Commonly known as: ZYLOPRIM   1 tablet, Oral, Daily PRN      apixaban 5 MG tablet tablet  Commonly known as: ELIQUIS   5 mg, Oral, Every 12 Hours Scheduled      atorvastatin 40 MG tablet  Commonly known as: LIPITOR   40 mg, Oral, Daily      Breztri Aerosphere 160-9-4.8 MCG/ACT aerosol inhaler  Generic drug: Budeson-Glycopyrrol-Formoterol   2 puffs, Inhalation, 2 Times Daily      cloNIDine 0.2 MG tablet  Commonly known as: CATAPRES   1 tablet, Oral, Every 8 Hours PRN      Levothyroxine Sodium 175 MCG capsule   175 mcg, Oral, Daily      MIRCERA IJ   Once As Needed      pantoprazole 40 MG EC tablet  Commonly known as: PROTONIX   40 mg, Oral, 2 Times Daily      sertraline 100 MG tablet  Commonly known as: ZOLOFT   100 mg, Oral, Nightly      Skyrizi Pen 150 MG/ML solution auto-injector  Generic drug: Risankizumab-rzaa   1 dose, Subcutaneous, Take As Directed, Every 3 months      torsemide 100 MG tablet  Commonly known as: DEMADEX   100 mg, Oral, Daily             Stop These Medications      amitriptyline 10 MG tablet  Commonly known as: ELAVIL     carvedilol 25 MG tablet  Commonly known as: COREG     Insulin Glargine 100 UNIT/ML injection pen  Commonly known as: LANTUS SOLOSTAR     lidocaine-prilocaine 2.5-2.5 % cream  Commonly known as: EMLA     midodrine 2.5 MG tablet  Commonly known as: PROAMATINE     sevelamer 800 MG tablet  Commonly known as: RENVELA  Replaced by: sevelamer 0.8 g pack packet     traZODone 50 MG tablet  Commonly known as: DESYREL              No Known Allergies    Discharge Disposition:  Home or Self Care      Discharge Diet:  Diet Order   Procedures    Diet: Regular/House; Texture: Mechanical Ground (NDD 2); Fluid Consistency: Thin (IDDSI 0)       Discharge Activity:   Activity Instructions       Activity as Tolerated              CODE STATUS:    Code Status and Medical Interventions: CPR (Attempt to  Resuscitate); Full Support   Ordered at: 11/01/24 0206     Code Status (Patient has no pulse and is not breathing):    CPR (Attempt to Resuscitate)     Medical Interventions (Patient has pulse or is breathing):    Full Support       Future Appointments   Date Time Provider Department Center   11/22/2024  1:15 PM [place]ingo Bravo [place] [facility] [place] [facility]   11/25/2024  9:00 AM [facility] RUSTY CT 1  AMY ETWCT [facility]   11/27/2024 12:40 PM Areli Conner MD [facility] [place] [facility]   12/13/2024 10:00 AM Jessy Sigala MD K ID RA RA   12/31/2024  7:30 AM Armando Guallpa DPM [facility] [place] [facility]   1/9/2025  9:45 AM Sandra Espinal MD [facility] [place] [facility] [facility]   1/9/2025  2:00 PM Stephan Nichols MD [facility] [place] [facility]   1/10/2025 10:45 AM Domingo Bravo MD [facility] [place] [facility] [facility]   1/27/2025  9:00 AM Candace Das [place] [facility] [place]     Additional Instructions for the Follow-ups that You Need to Schedule       Ambulatory Referral to Home Health   As directed      Face to Face Visit Date: 11/18/2024   Follow-up provider for Plan of Care?: I treated the patient in an acute care facility and will not continue treatment after discharge.   Follow-up provider: DOMINGO BRAVO [[id number]]   Reason/Clinical Findings: weakness and delirium   Describe mobility limitations that make leaving home difficult: not driving   Nursing/Therapeutic Services Requested: Physical Therapy Occupational Therapy   Frequency: 1 Week 1        Discharge Follow-up with PCP   As directed       Currently Documented PCP:    Domingo Bravo MD    PCP Phone Number:    [phone]     Follow Up Details: 1 week        Discharge Follow-up with Specified Provider: nephrology; 1 Week   As directed      To: nephrology   Follow Up: 1 Week        Discharge Follow-up with Specified Provider: neurosurgery as directed   As directed      To: neurosurgery as directed        Discharge Follow-up with Specified Provider: psychiatry as directed   As directed      To: psychiatry as directed                Follow-up Information       Domingo Bravo MD .    Specialty: Internal Medicine  Why: 1 week  Contact information:  [address] [facility] [place] [address]  [phone]                             Additional Instructions for the Follow-ups that You Need to Schedule       Ambulatory Referral to Home Health   As directed      Face to Face Visit Date: 11/18/2024   Follow-up provider for Plan of Care?: I treated the patient in an acute care facility and will not continue treatment after discharge.   Follow-up provider: DOMINGO BRAVO [[id number]]   Reason/Clinical Findings: weakness and delirium   Describe mobility limitations that make leaving home difficult: not driving   Nursing/Therapeutic Services Requested: Physical Therapy Occupational Therapy   Frequency: 1 Week 1        Discharge Follow-up with PCP   As directed       Currently Documented PCP:    Domingo Bravo MD    PCP Phone Number:    [phone]     Follow Up Details: 1 week        Discharge Follow-up with Specified Provider: nephrology; 1 Week   As directed      To: nephrology   Follow Up: 1 Week        Discharge Follow-up with Specified Provider: neurosurgery as directed   As directed      To: neurosurgery as directed        Discharge Follow-up with Specified Provider: psychiatry as directed   As directed      To: psychiatry as directed            Time Spent on Discharge:  Greater than 30 minutes      Donovan Griffiths MD  [facility]  11/18/24  11:58 EST

## 2024-11-18 NOTE — PROGRESS NOTES
The patient is in dialysis and unavailable for interview today.  Family reports that he is greatly improved.

## 2024-11-18 NOTE — PROGRESS NOTES
"[facility] Clinical Pharmacy Services: Vancomycin Monitoring Note    Nelson Wang is a 79 y.o. male who is on day 18 of pharmacy to dose vancomycin for L1/2 and T5/6 discitis.  Stop date: 12/13/24     Previous Vancomycin Dose: Intermittent pulse dosing     Updated Cultures and Sensitivities:   10/31: BCx-CoN Staph (2 of 2 bottles)  10/31: MRSA PCR-negative  11/2: BCx-negative    Results from last 7 days   Lab Units 11/18/24  0655 11/15/24  0544 11/13/24  0511   VANCOMYCIN RM mcg/mL 20.50 19.80 18.40     Vitals/Labs  Ht: 167 cm (65.75\"); Wt: 67.9 kg (149 lb 11.1 oz)   Temp Readings from Last 1 Encounters:   11/18/24 97.4 °F (36.3 °C) (Temporal)     Estimated Creatinine Clearance: 8.8 mL/min (A) (by C-G formula based on SCr of 6.62 mg/dL (H)).   Results from last 7 days   Lab Units 11/18/24  0655 11/17/24  0431 11/16/24  0431   CREATININE mg/dL 6.62* 5.24* 3.57*   WBC 10*3/mm3 5.13 5.84 5.45     Assessment/Plan  Next dialysis planned for 11/18.    Continue vancomycin intermittent dosing with HD. Vancomycin 500 mg x 1 given after last dialysis on 11/18  Next Level Date and Time: not ordered, pt has discharge orders  We will continue to monitor patient changes and dialysis schedule.     Thank you for involving pharmacy in this patient's care. Please contact pharmacy with any questions or concerns.       Treasure Hebert RPH  11/18/24 12:30 EST        "

## 2024-11-18 NOTE — PLAN OF CARE
Goal Outcome Evaluation:  Plan of Care Reviewed With: child           Outcome Evaluation: Discussed with pt's daughter.  States pt ate well over weekend taking % if meals.  Daughter states she will continue with ground at home and that pt is d/cing following dialysis.    Anticipated Discharge Disposition (SLP): unknown

## 2024-11-18 NOTE — NURSING NOTE
Placed call to Dr. Wolf regarding discharge vanc orders. Spoke to Nancy; awaiting call back.     Call back received from Dr. Wolf; discussed plan with gianluca Tellez mgr and this RN. Unable to obtain vanc order for discharge as requested per Denys in [facility] to get approved per their nephrologist, Dr. Castro. This must be completed prior to discharge. Dr. Wolf is not in hospital today and unable to give orders per conversation with Rosalinda and Dr. Wolf.

## 2024-11-18 NOTE — PLAN OF CARE
Problem: Adult Inpatient Plan of Care  Goal: Plan of Care Review  Outcome: Progressing  Flowsheets (Taken 11/18/2024 2099)  Progress: no change  Plan of Care Reviewed With: patient  Pt rested overnight. Sitter at bedside. Given one dose of Zyprexa for agitation. PRN pain medication given per order. Pt alert to self. Remains on RA. Plan for HD in the morning. Plan to discharge after HD. All needs met.

## 2024-11-19 ENCOUNTER — TRANSITIONAL CARE MANAGEMENT TELEPHONE ENCOUNTER (OUTPATIENT)
Dept: CALL CENTER | Facility: HOSPITAL | Age: 79
End: 2024-11-19
Payer: MEDICARE

## 2024-11-19 NOTE — PROGRESS NOTES
"Enter Query Response Below      Query Response: He had metabolic encephalopathy on admission secondary to his underlying infection.  This improved with treatment as of infection but also had issues with delirium from underlying dementia and hospitalization.  These are 2 separate problems and not related.           If applicable, please update the problem list.     Patient: Nelson Wang        : 1945  Account: [id number]           Admit Date: 2024        How to Respond to this query:       a. Click New Note     b. Answer query within the yellow box.                c. Update the Problem List, if applicable.      If you have any questions about this query contact me at: [phone]     Dr. Griffiths:    79 y.o. male with history of probable mild dementia and delirium, ESRD, chronic respiratory failure, hypertension, type 2 diabetes, anemia of chronic disease, and malnutrition presented with back pain and confusion.  Patient found bacteremia and discitis and osteomyelitis.  H&P notes confusion, disorientation and not following commands.  Psychiatry consulted with noted \"delirium.\"  [place] Coma Scale 13-14 during hospital stay per nursing documentation.  Progress notes state patient is confused and at discharge continues to be disoriented per discharge summary.   Patient given vancomycin IV, lorazepam IV and po.  Discharge summary includes \"metabolic encephalopathy\" and also states \"His delirium has been the biggest steph to getting out of the hospital.  It is fluctuated quite a bit through the hospitalization at times he can be fairly lucid and have appropriate conversations but the majority of the time he is disoriented and even other times he is completely somnolent lethargic and minimally responsive.\"     After study, was metabolic encephalopathy clinically supported during this admission?    Metabolic encephalopathy was supported with additional clinical indicators:____________  Metabolic " encephalopathy was not supported  Other- specify_____________  Unable to determine      By submitting this query, we are merely seeking further clarification of documentation to accurately reflect all conditions that you are monitoring, evaluating, treating or that extend the hospitalization or utilize additional resources of care. Please utilize your independent clinical judgment when addressing the question(s) above.     This query and your response, once completed, will be entered into the legal medical record.    Sincerely,  Radha Savage, MSN, APRN-BC, CCDS   Clinical Documentation Integrity Program   [email or web address]

## 2024-11-19 NOTE — PROGRESS NOTES
Case Management Discharge Note      Final Note: Pt DC home with OP HD and [facility]         Selected Continued Care - Discharged on 11/18/2024 Admission date: 11/1/2024 - Discharge disposition: Home or Self Care      Destination    No services have been selected for the patient.                Durable Medical Equipment    No services have been selected for the patient.                Dialysis/Infusion    No services have been selected for the patient.                Home Medical Care Coordination complete.      Service Provider Services Address Phone Fax Patient Preferred    [facility] [address] [address] [phone] --              Therapy    No services have been selected for the patient.                Community Resources    No services have been selected for the patient.                Community & DME    No services have been selected for the patient.                         Final Discharge Disposition Code: 06 - home with home health care

## 2024-11-19 NOTE — OUTREACH NOTE
Prep Survey      Flowsheet Row Responses   [facility] patient discharged from? [place]   Is LACE score < 7 ? Yes   Eligibility [facility]   Date of Admission 11/01/24   Date of Discharge 11/18/24   Discharge Disposition Home-Health Care Sv   Discharge diagnosis Discitis of lumbar region   Does the patient have one of the following disease processes/diagnoses(primary or secondary)? Other   Does the patient have Home health ordered? Yes   What is the Home health agency?  [facility]   Comments regarding appointments OP [facility]   Prep survey completed? Yes            Veronica FRAZIER - Registered Nurse

## 2024-11-19 NOTE — OUTREACH NOTE
Call Center TCM Note      Flowsheet Row Responses   [facility] patient discharged from? [facility]   Does the patient have one of the following disease processes/diagnoses(primary or secondary)? Other   TCM attempt successful? Yes   Call start time 1319   Call end time 1320   Discharge diagnosis Discitis of lumbar region   Meds reviewed with patient/caregiver? Yes   Is the patient having any side effects they believe may be caused by any medication additions or changes? No   Does the patient have all medications ordered at discharge? Yes   Is the patient taking all medications as directed (includes completed medication regime)? Yes   Comments HOSP DC FU appt 11/22/24 115 pm.   Does the patient have an appointment with their PCP within 7-14 days of discharge? Yes   What is the Home health agency?  [facility]    Has home health visited the patient within 72 hours of discharge? Call prior to 72 hours   Psychosocial issues? No   Did the patient receive a copy of their discharge instructions? Yes   Nursing interventions Reviewed instructions with patient   What is the patient's perception of their health status since discharge? Improving   Is the patient/caregiver able to teach back signs and symptoms related to disease process for when to call PCP? Yes   Is the patient/caregiver able to teach back signs and symptoms related to disease process for when to call 911? Yes   Is the patient/caregiver able to teach back the hierarchy of who to call/visit for symptoms/problems? PCP, Specialist, Home health nurse, Urgent Care, ED, 911 Yes   TCM call completed? Yes   Wrap up additional comments Daughter reports pt is improving. No needs or questions at this time.   Call end time 1320            Zaira Lara RN    11/19/2024, 13:20 EST

## 2024-11-19 NOTE — OUTREACH NOTE
Call Center TCM Note      Flowsheet Row Responses   [facility] patient discharged from? [place]   Does the patient have one of the following disease processes/diagnoses(primary or secondary)? Other   TCM attempt successful? No   Unsuccessful attempts Attempt 1            Zaira Lara RN    11/19/2024, 08:16 EST

## 2024-11-22 ENCOUNTER — OFFICE VISIT (OUTPATIENT)
Dept: INTERNAL MEDICINE | Facility: CLINIC | Age: 79
End: 2024-11-22
Payer: MEDICARE

## 2024-11-22 ENCOUNTER — TELEPHONE (OUTPATIENT)
Dept: INTERNAL MEDICINE | Facility: CLINIC | Age: 79
End: 2024-11-22

## 2024-11-22 VITALS
WEIGHT: 158.2 LBS | HEART RATE: 60 BPM | SYSTOLIC BLOOD PRESSURE: 128 MMHG | HEIGHT: 66 IN | OXYGEN SATURATION: 86 % | DIASTOLIC BLOOD PRESSURE: 63 MMHG | TEMPERATURE: 97.2 F | BODY MASS INDEX: 25.43 KG/M2

## 2024-11-22 DIAGNOSIS — M46.46 DISCITIS OF LUMBAR REGION: ICD-10-CM

## 2024-11-22 DIAGNOSIS — Z09 HOSPITAL DISCHARGE FOLLOW-UP: Primary | ICD-10-CM

## 2024-11-22 DIAGNOSIS — J96.11 CHRONIC RESPIRATORY FAILURE WITH HYPOXIA: ICD-10-CM

## 2024-11-22 DIAGNOSIS — Z99.2 ESRD (END STAGE RENAL DISEASE) ON DIALYSIS: ICD-10-CM

## 2024-11-22 DIAGNOSIS — E43 UNSPECIFIED SEVERE PROTEIN-CALORIE MALNUTRITION: ICD-10-CM

## 2024-11-22 DIAGNOSIS — N18.6 ESRD (END STAGE RENAL DISEASE) ON DIALYSIS: ICD-10-CM

## 2024-11-22 DIAGNOSIS — F03.90 DEMENTIA, UNSPECIFIED DEMENTIA SEVERITY, UNSPECIFIED DEMENTIA TYPE, UNSPECIFIED WHETHER BEHAVIORAL, PSYCHOTIC, OR MOOD DISTURBANCE OR ANXIETY: ICD-10-CM

## 2024-11-22 DIAGNOSIS — R41.0 DELIRIUM: ICD-10-CM

## 2024-11-22 DIAGNOSIS — K21.9 GASTROESOPHAGEAL REFLUX DISEASE, UNSPECIFIED WHETHER ESOPHAGITIS PRESENT: ICD-10-CM

## 2024-11-22 RX ORDER — DONEPEZIL HYDROCHLORIDE 5 MG/1
5 TABLET, FILM COATED ORAL NIGHTLY
Qty: 90 TABLET | Refills: 2 | Status: SHIPPED | OUTPATIENT
Start: 2024-11-22

## 2024-11-22 RX ORDER — FAMOTIDINE 20 MG/1
20 TABLET, FILM COATED ORAL DAILY
Qty: 90 TABLET | Refills: 2 | Status: SHIPPED | OUTPATIENT
Start: 2024-11-22

## 2024-11-22 NOTE — PROGRESS NOTES
Transitional Care Follow Up Visit  Subjective     Nelson MEAGAN Wang is a 79 y.o. male who presents for a transitional care management visit.    Within 48 business hours after discharge our office contacted him via telephone to coordinate his care and needs.      I reviewed and discussed the details of that call along with the discharge summary, hospital problems, inpatient lab results, inpatient diagnostic studies, and consultation reports with Nelson.     Current outpatient and discharge medications have been reconciled for the patient.  Reviewed by: Domingo Bravo MD          11/18/2024     7:01 PM   Date of TCM Phone Call   [facility]   Date of Admission 11/1/2024   Date of Discharge 11/18/2024   Discharge Disposition [facility]     Risk for Readmission (LACE) Score: 16 (11/18/2024  6:00 AM)      History of Present Illness     Course During Hospital Stay:  15 days in [place], 4-5 days in [place]      The following portions of the patient's history were reviewed and updated as appropriate: allergies, current medications, past family history, past medical history, past social history, past surgical history, and problem list.    Hospital Course:    Mr. Wang is a 79 y.o. male with a history of probable mild dementia and history of delirium, end-stage renal disease, chronic respiratory failure with hypoxia, hypertension, type 2 diabetes, anemia of chronic disease, and malnutrition who presented to [facility] initially complaining of back pain and confusion.  Please see the admitting history and physical for further details.  He was found to have bacteremia and discitis and osteomyelitis and was admitted to the hospital for further evaluation and treatment.  He was initially seen at an outside hospital and was transferred for neurosurgical evaluation.  Neurosurgery evaluated the patient here and plans follow-up in 3 months with repeat imaging.  Infectious  disease evaluated the patient and is recommended 6 weeks of antibiotics with dialysis.  He has had oropharyngeal dysphagia and did require a core track feeding tube for a while but ultimately this was discontinued and he is now tolerating a diet.  He had difficulty with dialysis due to his delirium and at this point is requiring Ativan prior to dialysis in order to keep him from being so agitated during the procedure.  Pain is minimized another issue for him and his back he is tolerating the pain medications well and has a primary care follow-up in 7 days with enough medication being prescribed to get him to that visit.  His delirium has been the biggest steph to getting out of the hospital.  It is fluctuated quite a bit through the hospitalization at times he can be fairly lucid and have appropriate conversations but the majority of the time he is disoriented and even other times he is completely somnolent lethargic and minimally responsive.  An MRI of the brain was done as well as an EEG with no structural abnormalities within the brain and no seizure activity noted.  Neurology did evaluate the patient and feels his symptoms and issues are related to delirium.  The hope is that with ongoing treatment his mental status improves and he recovers enough at home.  We did entertain the idea of possibly going to a skilled nursing facility with dialysis but the fear is that this would cause further changes and hindrance in his delirium and make it difficult for him to improved to baseline. His overall prognosis remains pretty guarded at this point.  It is unclear if he will return to or even improve his overall cognitive function.  Only time will tell at this point.  Will need to get him out of the hospital continue to treat his infection and move forward in that direction in order to see if things recover.  I talked extensively with his daughter and his wife about the issues.  They are all in agreement that the best  thing for him at this point is to at least try and make things work at home.  She has hired private caregivers to help at times that the delirium becomes bad she plans to do dialysis in the outpatient dialysis center in a separate room in order to try to keep him calm and allow him to get through dialysis sessions.  I have asked that he follow-up with his primary care physician and his nephrologist and other subspecialist closely in the outpatient setting following discharge.     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 Because of the previous mentioned issues, the patient has a mobility limitation that significantly impairs them to accomplish their MRADL entirely in the home. Mobility limitation cannot be resolved by using a cane or walker because she her pain and partial weightbearing on lower extremities. The patient's functional mobility deficit will be resolved with the use of a wheelchair. The patient is planning to use the wheelchair on a regular basis in the home and has not expressed an unwillingness to do so. The patient can safely use a manual wheelchair and has the strength to maneuver the wheelchair independently. Patient is physically incapable of accessing and or utilizing regular toilet facilities safely and independently within the home and will require a bedside commode. Patient is confined to a single room.     Interim Course:    Here with daughter (Dr. Jose Wang) and her .  Dr. Jose Wang is the primary historian today.    Used ativan once with dialysis since returning home Monday, but since that day hasn't needed it.  Pain has resolved, and so is no longer taking oxycodone.    Blood sugars have been fine, and so no insulin has been given this week.  She does plan on getting dexcom for her father.    PO has been reasonable, although one day he mostly slept throughout the day.    Oxygen  today was 86%.  He does wear oxygen at home, although only intermittently.    Although still getting confused at times, his mental status has improved compared to when he was in the hospital.    Dr. Nelson Wang has home health. PT and OT will start next week.      Objective   Vitals:    11/22/24 1311   BP: 128/63   Pulse: 60   Temp: 97.2 °F (36.2 °C)   SpO2: (!) 86%       Appearance: No acute distress, well-nourished  Head: normocephalic, atraumatic  Eyes: extraocular movements intact, no scleral icterus, no conjunctival injection  Ears, Nose, and Throat: external ears normal, nares patent, moist mucous membranes  Cardiovascular: regular rate and rhythm. no murmurs, rales, or rhonchi. no edema  Respiratory: breathing comfortably, symmetric chest rise, clear to auscultation bilaterally. No wheezes, rales, or rhonchi.  Neuro: stable.  Shuffling gait    Result Review :   The following data was reviewed by: Domingo Bravo MD on 11/22/2024:  Common labs          11/16/2024    04:31 11/17/2024    04:31 11/18/2024    06:55   Common Labs   Glucose 79  116  120    BUN 17  27  37    Creatinine 3.57  5.24  6.62    Sodium 135  132  137    Potassium 3.9  4.0  4.4    Chloride 97  94  95    Calcium 8.9  9.1  9.4    Albumin 2.9  3.1  3.4    WBC 5.45  5.84  5.13    Hemoglobin 8.2  8.7  8.6    Hematocrit 27.9  28.0  27.4    Platelets 235  204  197        No orders to display            Lab Results   Component Value Date    COVID19 Not Detected 10/08/2024    RSV Not Detected 10/08/2024    INR 0.96 05/28/2024    BILIRUBINUR Negative 10/27/2024       Assessment & Plan     Diagnoses and all orders for this visit:    1. Hospital discharge follow-up (Primary)    2. ESRD (end stage renal disease) on dialysis    3. Delirium    4. Discitis of lumbar region    5. Unspecified severe protein-calorie malnutrition    6. Chronic respiratory failure with hypoxia    7. Gastroesophageal reflux disease, unspecified whether esophagitis present  -      famotidine (Pepcid) 20 MG tablet; Take 1 tablet by mouth Daily.  Dispense: 90 tablet; Refill: 2    8. Likely Dementia  -     donepezil (Aricept) 5 MG tablet; Take 1 tablet by mouth Every Night.  Dispense: 90 tablet; Refill: 2        Discitis:  -improved  -on vancomycin  -following with ID    Delirium and likely dementia:  -will start aricept  -will be seeing Dr. Conner in the near term    Malnutrition:  -based on a study showing improvements in skeletal muscle mass and strength, have recommended a trial of 5 gm daily supplemental creatine to Dr. Wang.  I did provide his daughter copies of the study in question      Medications Discontinued During This Encounter   Medication Reason    pantoprazole (PROTONIX) 40 MG EC tablet        Return in about 7 weeks (around 1/10/2025) for Medicare Wellness, previously scheduled appointment.           Domingo Bravo MD  11/22/24  14:37 EST

## 2024-11-25 ENCOUNTER — TELEPHONE (OUTPATIENT)
Dept: INTERNAL MEDICINE | Facility: CLINIC | Age: 79
End: 2024-11-25
Payer: MEDICARE

## 2024-11-26 ENCOUNTER — TELEPHONE (OUTPATIENT)
Dept: INTERNAL MEDICINE | Facility: CLINIC | Age: 79
End: 2024-11-26
Payer: MEDICARE

## 2024-11-26 LAB
QT INTERVAL: 439 MS
QTC INTERVAL: 460 MS

## 2024-11-26 NOTE — TELEPHONE ENCOUNTER
Per Alfreda at [facility] they've done their evaluation & plan to see the pt 2x a wk for 3wk.

## 2024-11-26 NOTE — PROGRESS NOTES
"Enter Query Response Below      Query Response: Discitis and osteomyelitis due to infected tunneled catheter              If applicable, please update the problem list.     Patient: Nelson Wang        : 1945  Account: [id number]           Admit Date: 2024        How to Respond to this query:       a. Click New Note     b. Answer query within the yellow box.                c. Update the Problem List, if applicable.      If you have any questions about this query contact me at: [phone]     Dr. Griffiths:    79 y.o. male with history of ESRD, type 2 diabetes, anemia of chronic disease, and malnutrition, admitted for further evaluation of back pain and confusion.  Progress note includes \"has a R IJ tunnel catheter for HD access as his AV fistula is not ready for use.\"  Catheter was removed  with op note stating \"infected right jugular tunneled catheter.\"  Progress note  states \"L1/2 discitis, T5/6 discitis-Blood cultures 10/31/2024 positive for coag negative staph-MRI of the cervical spine/thoracic/lumbar-imited studies but thought to have L1/2 discitis along with T5/6 discitis-TDC catheter removed 11/3/2024 as likely source of infection.\"  Vascular surgery note 11/3 includes \"Bloodstream infection with tunneled catheter indwelling.\"  Patient received vancomycin IV.  Discharge summary has \" He was found to have bacteremia and discitis and osteomyelitis.\"    Can this be further clarified as:    - Discitis and osteomyelitis due to infected tunneled catheter  - Discitis and osteomyelitis not due to infected tunneled catheter  - Other (specify) _______  - Unable to determine        By submitting this query, we are merely seeking further clarification of documentation to accurately reflect all conditions that you are monitoring, evaluating, treating or that extend the hospitalization or utilize additional resources of care. Please utilize your independent clinical judgment when addressing the " question(s) above.     This query and your response, once completed, will be entered into the legal medical record.    Sincerely,  LARRY James, APRN-BC, [facility]   Clinical Documentation Integrity Program   [email or web address]

## 2024-11-27 ENCOUNTER — OFFICE VISIT (OUTPATIENT)
Dept: PSYCHIATRY | Facility: CLINIC | Age: 79
End: 2024-11-27
Payer: MEDICARE

## 2024-11-27 VITALS
HEIGHT: 66 IN | DIASTOLIC BLOOD PRESSURE: 80 MMHG | SYSTOLIC BLOOD PRESSURE: 127 MMHG | HEART RATE: 115 BPM | BODY MASS INDEX: 25.36 KG/M2 | WEIGHT: 157.8 LBS

## 2024-11-27 DIAGNOSIS — N18.4 CKD (CHRONIC KIDNEY DISEASE), STAGE IV: ICD-10-CM

## 2024-11-27 DIAGNOSIS — N18.5 STAGE 5 CHRONIC KIDNEY DISEASE: ICD-10-CM

## 2024-11-27 DIAGNOSIS — F05 DELIRIUM DUE TO ANOTHER MEDICAL CONDITION: ICD-10-CM

## 2024-11-27 DIAGNOSIS — R45.1 AGITATION: ICD-10-CM

## 2024-11-27 DIAGNOSIS — R44.3 HALLUCINATIONS: Primary | ICD-10-CM

## 2024-11-27 DIAGNOSIS — F31.5 BIPOLAR DISORDER, CURRENT EPISODE DEPRESSED, SEVERE, WITH PSYCHOTIC FEATURES: ICD-10-CM

## 2024-11-27 RX ORDER — QUETIAPINE FUMARATE 25 MG/1
TABLET, FILM COATED ORAL
Qty: 120 TABLET | Refills: 2 | Status: SHIPPED | OUTPATIENT
Start: 2024-11-27

## 2024-11-27 NOTE — PATIENT INSTRUCTIONS
1.  Please return to clinic at your next scheduled visit.  Contact the clinic ([phone]) at least 24 hours prior in the event you need to cancel.  2.  Do no harm to yourself or others.    3.  Avoid alcohol and drugs.    4.  Take all medications as prescribed.  Please contact the clinic with any concerns. If you are in need of medication refills, please call the clinic at [phone].    5. Should you want to get in touch with your provider, Dr. Areli Conner, please utilize [employer] or contact the office ([phone]), and staff will be able to page Dr. Conner directly.  6.  In the event you have personal crisis, contact the following crisis numbers: Suicide Prevention Hotline 1-723.582.7250; SILVIA Helpline 5-299-222-SILVIA; [facility] Emergency Room [phone]; text HELLO to 445563; or 145.                Conjuntivae and eyelids appear normal,  Sclerae : White without injection

## 2024-11-27 NOTE — PROGRESS NOTES
Subjective   Nelson Wang is a 79 y.o. male who presents today for initial evaluation     Referring Provider:  No referring provider defined for this encounter.    Chief Complaint:  depression, hallucinations    History of Present Illness:     11/27/2024: INITIAL VISIT Chart review:     Domo: Oxycodone 5 mg #42 and Ativan 0.5 #21 on November 18, 2024  Care Everywhere: several non behavioral health notes    Psychotropic medication chart review:  Present:  Aricept 5 mg nightly  Abilify 2 mg twice daily  Clonidine 0.2 mg 3 times daily  Zoloft 100 mg qday    Previously:  Zoloft 200 mg a day  BuSpar 5 mg twice daily  Olanzapine 5 mg injection in November 2024  Quetiapine 12.5 mg 3 times daily  Risperidone 0.125, 0.5, 1, 2 mg twice daily  Zoloft 50 mg a day  Trazodone 50 mg at night    EKG: November 2024: Rate 90, sinus, abnormal R-wave progression, late transition, QTc 446  Procedures: EEG in November 15, 2024 shows abnormal generalized slowing consistent with mild encephalopathy, no seizures  Head imaging: MRI of the brain in November 2024 shows no acute.  CT of the head at the same time shows mild small vessel disease in the cerebral white matter and mild cerebral atrophy.  Labs: November 2024: Abnormal CMP with creatinine 6.62 other abnormalities.  Abnormal CBC with hemoglobin 8.6 other abnormalities.  Abnormal .  Initial Chart Review Notes: Referred to us for agitation, confusion, altered mental status.  Looks like patient was admitted in November from November the first to the 18th.  May have developed discitis and osteomyelitis due to an infected tunneled catheter.  The infected right jugular tunneled catheter was removed.  Getting home health.  EEG confirms delirium (generalized slowing).  He requires Ativan prior to dialysis to keep him from becoming agitated during the procedure.  Pain appears to be minimized.  Delirium was the biggest steph to getting the patient out of the hospital.  Prognosis is  "very guarded.  It is unclear if he will return to his overall cognitive function.  Daughter and mother are going to try to make things work at home.  Discharged in a wheelchair.      Chart Review By Dates:      Planning:      VISITS/APPOINTMENTS (BELOW):    \"Nelson\" Stephen Garcia is her   Stage V chronic kidney disease, type 2 diabetes, anemia of chronic disease, malnutrition.    11/27/2024:   In person.  Interview:  His/Her Story: \"  Jose:  Pacing a lot  Not sleeping  Not eating or drinking much  He's lost about 12 lbs since admission, but none since he has come home  HH at home    Energy? Down  Target weight is 165 (he's 157)  Depression/Mood: some depressed mood  poor energy, poor concentration, weight loss, psychomotor retardation or agitation, insomnia.  Seasonal pattern: def  Severity: mild  Duration: On and off for years  Anxiety:  Uncontrolled worrying, muscle tension, fatigue, poor concentration, feeling on edge or restless, irritability, insomnia.  Severity: Moderate  Duration: On and off for years  Panic attacks: stable  AIMS if on antipsychotic: no abnormal movements.  Psych ROS: Positive for depression, anxiety.  Positive for psychosis and amirah.  ADHD: def  PTSD: def  No SI HI AVH.  Medication compliant: y    Access to Firearms: denies    PHQ-9 Depression Screening  PHQ-9 Total Score:     Little interest or pleasure in doing things?     Feeling down, depressed, or hopeless?     PHQ-2 Total Score     Trouble falling or staying asleep, or sleeping too much?     Feeling tired or having little energy?     Poor appetite or overeating?     Feeling bad about yourself - or that you are a failure or have let yourself or your family down?     Trouble concentrating on things, such as reading the newspaper or watching television?     Moving or speaking so slowly that other people could have noticed? Or the opposite - being so fidgety or restless that you have been moving around a lot more than usual?   "   Thoughts that you would be better off dead, or of hurting yourself in some way?     PHQ-9 Total Score     If you checked off any problems, how difficult have these problems made it for you to do your work, take care of things at home, or get along with other people?              MINO-7       Past Surgical History:  Past Surgical History:   Procedure Laterality Date    ANKLE SURGERY Right 11/1990    APPENDECTOMY N/A 1954    ARTERIOVENOUS FISTULA/SHUNT SURGERY Left 07/16/2024    Procedure: Creation of left arm arteriovenous fistula;  Surgeon: Moses Mir MD;  Location: [facility] MAIN OR;  Service: Vascular;  Laterality: Left;    BRONCHOSCOPY N/A 03/01/2024    Procedure: BRONCHOSCOPY WITH BAL AND WASHINGS;  Surgeon: Sandra Espinal MD;  Location: [facility] ENDOSCOPY;  Service: Pulmonary;  Laterality: N/A;  PNEUMONIA    BRONCHOSCOPY N/A 08/30/2024    Procedure: BRONCHOSCOPY WITH BALS AND WASHINGS;  Surgeon: Sandra Espinal MD;  Location: [facility] ENDOSCOPY;  Service: Pulmonary;  Laterality: N/A;  MUCOUS PLUGGING    CARDIAC CATHETERIZATION N/A 05/29/2024    Procedure: Left Heart Cath with possible coronary angioplasty;  Surgeon: Stephan Nichols MD;  Location: [facility] CATH INVASIVE LOCATION;  Service: Cardiology;  Laterality: N/A;    COLONOSCOPY N/A 06/2022    [facility]    ENDOSCOPY N/A 10/28/2024    Procedure: ESOPHAGOGASTRODUODENOSCOPY INSERTION OF LIGHTED INSTRUMENT TO VIEW ESOPHAGUS, STOMACH AND SMALL INTESTINE;  Surgeon: Kingsley Peraza MD;  Location: [facility] ENDOSCOPY;  Service: Gastroenterology;  Laterality: N/A;  Gastritis    HIP BIPOLAR REPLACEMENT Right 01/2000    INSERTION HEMODIALYSIS CATHETER Left 04/09/2024    Procedure: HEMODIALYSIS CATHETER INSERTION;  Surgeon: Jose Berry MD;  Location: [facility] OR;  Service: General;  Laterality: Left;    INSERTION HEMODIALYSIS CATHETER N/A 04/12/2024    Procedure: Tunneled hemodialysis catheter insertion;  Surgeon: Enrique Vinson MD;   Location: [facility] MAIN OR;  Service: Vascular;  Laterality: N/A;    INSERTION PERITONEAL DIALYSIS CATHETER N/A 03/27/2023    Procedure: LAPAROSCOPIC INSERTION PERITONEAL DIALYSIS CATHETER, LAPAROSCOPIC OMENTOPEXY WITH LYSIS OF ADHESIONS;  Surgeon: Jose Berry MD;  Location: [facility] MAIN OR;  Service: General;  Laterality: N/A;    INSERTION PERITONEAL DIALYSIS CATHETER Left 07/23/2023    Procedure: REVISION OF PERITONEAL DIALYSIS CATHETER;  Surgeon: Radha Oreilly MD;  Location: [facility] MAIN OR;  Service: General;  Laterality: Left;    REMOVAL PERITONEAL DIALYSIS CATHETER N/A 04/09/2024    Procedure: REMOVAL PERITONEAL DIALYSIS CATHETER;  Surgeon: Jose Berry MD;  Location: [facility] MAIN OR;  Service: General;  Laterality: N/A;    RENAL BIOPSY Left 07/15/2022    UPPER GASTROINTESTINAL ENDOSCOPY      WRIST SURGERY      UNSURE WHICH SIDE DAUGHTER REPORTS HAD SEVERE  CUT FROM WINDOW AND THEY HAD TO DO RECONSTRUCTIVE SURGERY WITH VESSELS AND NERVES       Problem List:  Patient Active Problem List   Diagnosis    Essential hypertension    Bradycardia, sinus    Anemia due to chronic kidney disease    Hypothyroidism    Idiopathic gout    Proteinuria    Psoriasis    Thrombocytopenia    Type 2 diabetes mellitus without complication    CKD (chronic kidney disease), stage IV    Hyperlipidemia    Monoclonal gammopathy of unknown significance (MGUS)    Stage 5 chronic kidney disease    Chronic gout without tophus    Peritoneal dialysis catheter dysfunction    Medicare annual wellness visit, subsequent    Chronic cough    Peritonitis associated with peritoneal dialysis    Recurrent pneumonia    Acute on chronic respiratory failure with hypoxia    End stage renal disease    Aspiration pneumonitis    Sepsis    Type 2 diabetes mellitus, with long-term current use of insulin    GERD without esophagitis    Immunosuppression due to drug therapy    Bipolar disorder    Chronic respiratory failure with hypoxia     Shortness of breath    Abnormal stress test    ESRD on dialysis    Abnormal chest CT    Encounter for screening for malignant neoplasm of colon    History of colon polyps    Family history of colon cancer    Intractable pain    Abdominal pain    ESRD (end stage renal disease) on dialysis    AMS (altered mental status)    Hallucinations    LUQ pain    Discitis    Metabolic encephalopathy    Aspiration pneumonia    Discitis of lumbar region    Severe malnutrition    Dementia    Unspecified severe protein-calorie malnutrition       Allergy:   No Known Allergies     Discontinued Medications:  Medications Discontinued During This Encounter   Medication Reason    cloNIDine (CATAPRES) 0.2 MG tablet *Therapy completed    ARIPiprazole (ABILIFY) 2 MG tablet Not Efficacious       Current Medications:   Current Outpatient Medications   Medication Sig Dispense Refill    apixaban (ELIQUIS) 5 MG tablet tablet Take 1 tablet by mouth Every 12 (Twelve) Hours for 30 days. Indications: Atrial Fibrillation 60 tablet 0    atorvastatin (LIPITOR) 40 MG tablet Take 1 tablet by mouth Daily. 90 tablet 1    Budeson-Glycopyrrol-Formoterol (Breztri Aerosphere) 160-9-4.8 MCG/ACT aerosol inhaler Inhale 2 puffs 2 (Two) Times a Day. 1 each 5    donepezil (Aricept) 5 MG tablet Take 1 tablet by mouth Every Night. 90 tablet 2    famotidine (Pepcid) 20 MG tablet Take 1 tablet by mouth Daily. 90 tablet 2    Insulin Pen Needle (BD Pen Needle Ayse 2nd Gen) 32G X 4 MM misc 1 each 4 (Four) Times a Day for use with insulin injections 100 each 0    Levothyroxine Sodium 175 MCG capsule Take 175 mcg by mouth Daily. 30 capsule 0    Methoxy PEG-Epoetin Beta (MIRCERA IJ) 1 (One) Time As Needed (Every 14 days PRN- would receive during dialysis).      naloxone (NARCAN) 4 MG/0.1ML nasal spray Call 911. Don't prime. [place] in 1 nostril for overdose. Repeat in 2-3 minutes in other nostril if no or minimal breathing/responsiveness. 2 each 0    Risankizumab-rzaa (Skyrizi  Pen) 150 MG/ML solution auto-injector Inject 1 dose under the skin into the appropriate area as directed Take As Directed. Every 3 months      sertraline (ZOLOFT) 100 MG tablet Take 1 tablet by mouth Every Night. 90 tablet 2    sevelamer (RENVELA) 800 MG tablet Take 1 tablet by mouth 3 (Three) Times a Day With Meals. 270 tablet 0    torsemide (DEMADEX) 100 MG tablet Take 1 tablet by mouth Daily. 90 tablet 2    Vancomycin HCl (VANCOMYCIN PHARMACY INTERMITTENT/PULSE DOSING) Use Daily for 40 doses. Indications: Bone and/or Joint Infection      acetaminophen (TYLENOL) 500 MG tablet Take 2 tablets by mouth Every 8 (Eight) Hours. (Patient not taking: Reported on 11/27/2024)      allopurinol (ZYLOPRIM) 300 MG tablet Take 1 tablet by mouth Daily As Needed. (Patient not taking: Reported on 11/22/2024)      Insulin Lispro, 1 Unit Dial, (HumaLOG KwikPen) 100 UNIT/ML solution pen-injector Inject per sliding scale up to 7 Units under the skin Four Times a Day As Needed. Blood Glucose 150-199 mg/dL - 2 units, 200-249 - 3 units, 250-299 - 4 units, 300-349 - 5 units, 350-400 - 6 units, >400 - 7 units & Call Provider (Patient not taking: Reported on 11/27/2024) 15 mL 0    Lidocaine 4 % Place 2 patches on the skin as directed by provider Daily for 8 days. Remove & Discard patch within 12 hours or as directed by MD (Patient not taking: Reported on 11/27/2024) 15 patch 0    QUEtiapine (SEROquel) 25 MG tablet 25 mg (1 tab) bid prn agitation. 50 mg (2 tab) qhs for insomnia. 120 tablet 2     No current facility-administered medications for this visit.       Past Medical History:  Past Medical History:   Diagnosis Date    Abnormal stress test     Anemia     IRON INFUSIONS WITH DIALYSIS    Anesthesia     WITH HIP REPLACEMENT DAUGHTER BELIEVES HAD SVT 2000    Ankle pain, right     Anxiety and depression     CKD (chronic kidney disease), stage IV     DIALYSIS [id number] SHILEY IN RIGHT CHEST    Diabetes     Gout     High cholesterol      History of peritoneal dialysis     HL (hearing loss)     Hyperkalemia     Hypertension     Hypothyroidism     Insomnia     Night terrors     Psoriasis     Psoriasis     Sleep walking     Thrombocytopenia     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    Vitiligo        Past Psychiatric History:  Began Treatment: 30 years ago  Diagnoses: depression, substance use  Psychiatrist: 30 years ago  Therapist:Denies  Admission History:Denies  Medication Trials:    Zoloft: somewhat helpful    Abilify not helpful    Self Harm: Denies  Suicide Attempts:Denies      Substance Abuse History:   Types: hx of alcohol  Withdrawal Symptoms:Denies  Longest Period Sober:Not Applicable   AA: Not applicable     Social History:  Martial Status:  Employed:No  Kids:Yes  House:Lives in a house   History: Denies    Social History     Socioeconomic History    Marital status:    Tobacco Use    Smoking status: Former     Current packs/day: 0.00     Average packs/day: 1 pack/day for 10.0 years (10.0 ttl pk-yrs)     Types: Cigarettes     Start date:      Quit date: 2000     Years since quittin.9     Passive exposure: Past    Smokeless tobacco: Never    Tobacco comments:     quit 25 years ago, chews nicotine gum in past   Vaping Use    Vaping status: Never Used   Substance and Sexual Activity    Alcohol use: Not Currently     Comment: 1 DRINK/DAY-rarely    Drug use: Never    Sexual activity: Defer       Family History:   Suicide Attempts: Denies  Suicide Completions:Denies      Family History   Problem Relation Age of Onset    Diabetes Mother     Heart disease Father     Colon cancer Sister 77    Cancer Sister 35    Diabetes Sister     Diabetes Brother     Sleep apnea Paternal Aunt     Heart disease Paternal Uncle     Sleep apnea Daughter     Malig Hyperthermia Neg Hx        Developmental History:     Childhood:  def  High School:Completed  College: [employer]    Mental Status Exam  Appearance  : groomed, good eye contact, normal  "street clothes  Behavior  : pleasant and cooperative  Motor  : No abnormal  Speech  :normal rhythm, rate, volume, tone, not hyperverbal, not pressured, normal prosidy  Mood  : \"Intermission?  Affect  : a little confused, constricted, mood congruent, good variability  Thought Content  : negative suicidal ideations, negative homicidal ideations, negative obsessions  Perceptions  : negative auditory hallucinations, negative visual hallucinations  Thought Process  : linear  Insight/Judgement  : Fair/fair  Cognition  : grossly intact  Attention   : intact      Review of Systems:  Review of Systems   Constitutional:  Negative for diaphoresis and fatigue.   HENT:  Negative for drooling.    Eyes:  Negative for visual disturbance.   Respiratory:  Negative for cough and shortness of breath.    Cardiovascular:  Positive for leg swelling. Negative for chest pain and palpitations.   Gastrointestinal:  Negative for nausea and vomiting.   Endocrine: Negative for cold intolerance and heat intolerance.   Genitourinary:  Negative for difficulty urinating.   Musculoskeletal:  Positive for joint swelling.   Allergic/Immunologic: Positive for immunocompromised state.   Neurological:  Negative for dizziness, seizures, speech difficulty and numbness.       Physical Exam:  Physical Exam    Vital Signs:   /80   Pulse 115   Ht 167 cm (65.75\")   Wt 71.6 kg (157 lb 12.8 oz)   BMI 25.67 kg/m²      Lab Results:   No results displayed because visit has over 200 results.      No results displayed because visit has over 200 results.      Office Visit on 10/09/2024   Component Date Value Ref Range Status    TSH 10/08/2024 1.670  0.270 - 4.200 uIU/mL Final    Free T4 10/08/2024 1.30  0.92 - 1.68 ng/dL Final    25 Hydroxy, Vitamin D 10/08/2024 42.0  30.0 - 100.0 ng/ml Final    Hemoglobin A1C 10/08/2024 6.90 (H)  4.80 - 5.60 % Final   Admission on 10/08/2024, Discharged on 10/08/2024   Component Date Value Ref Range Status    QT Interval " 10/08/2024 397  ms Final    QTC Interval 10/08/2024 437  ms Final    Glucose 10/08/2024 117 (H)  65 - 99 mg/dL Final    BUN 10/08/2024 33 (H)  8 - 23 mg/dL Final    Creatinine 10/08/2024 4.51 (H)  0.76 - 1.27 mg/dL Final    Sodium 10/08/2024 131 (L)  136 - 145 mmol/L Final    Potassium 10/08/2024 3.9  3.5 - 5.2 mmol/L Final    Chloride 10/08/2024 88 (L)  98 - 107 mmol/L Final    CO2 10/08/2024 31.5 (H)  22.0 - 29.0 mmol/L Final    Calcium 10/08/2024 9.5  8.6 - 10.5 mg/dL Final    Total Protein 10/08/2024 7.6  6.0 - 8.5 g/dL Final    Albumin 10/08/2024 3.5  3.5 - 5.2 g/dL Final    ALT (SGPT) 10/08/2024 8  1 - 41 U/L Final    AST (SGOT) 10/08/2024 13  1 - 40 U/L Final    Alkaline Phosphatase 10/08/2024 67  39 - 117 U/L Final    Total Bilirubin 10/08/2024 0.6  0.0 - 1.2 mg/dL Final    Globulin 10/08/2024 4.1  gm/dL Final    A/G Ratio 10/08/2024 0.9  g/dL Final    BUN/Creatinine Ratio 10/08/2024 7.3  7.0 - 25.0 Final    Anion Gap 10/08/2024 11.5  5.0 - 15.0 mmol/L Final    eGFR 10/08/2024 12.6 (L)  >60.0 mL/min/1.73 Final    <15 Indicative of kidney failure    proBNP 10/08/2024 51,228.0 (H)  0.0 - 1,800.0 pg/mL Final    HS Troponin T 10/08/2024 97 (C)  <22 ng/L Final    Extra Tube 10/08/2024 Hold for add-ons.   Final    Auto resulted.    Extra Tube 10/08/2024 hold for add-on   Final    Auto resulted    Extra Tube 10/08/2024 Hold for add-ons.   Final    Auto resulted.    Extra Tube 10/08/2024 Hold for add-ons.   Final    Auto resulted    WBC 10/08/2024 5.68  3.40 - 10.80 10*3/mm3 Final    RBC 10/08/2024 3.14 (L)  4.14 - 5.80 10*6/mm3 Final    Hemoglobin 10/08/2024 9.3 (L)  13.0 - 17.7 g/dL Final    Hematocrit 10/08/2024 29.8 (L)  37.5 - 51.0 % Final    MCV 10/08/2024 94.9  79.0 - 97.0 fL Final    MCH 10/08/2024 29.6  26.6 - 33.0 pg Final    MCHC 10/08/2024 31.2 (L)  31.5 - 35.7 g/dL Final    RDW 10/08/2024 14.9  12.3 - 15.4 % Final    RDW-SD 10/08/2024 51.3  37.0 - 54.0 fl Final    MPV 10/08/2024 10.2  6.0 - 12.0 fL  Final    Platelets 10/08/2024 167  140 - 450 10*3/mm3 Final    Neutrophil % 10/08/2024 58.8  42.7 - 76.0 % Final    Lymphocyte % 10/08/2024 21.5  19.6 - 45.3 % Final    Monocyte % 10/08/2024 15.5 (H)  5.0 - 12.0 % Final    Eosinophil % 10/08/2024 1.9  0.3 - 6.2 % Final    Basophil % 10/08/2024 0.7  0.0 - 1.5 % Final    Immature Grans % 10/08/2024 1.6 (H)  0.0 - 0.5 % Final    Neutrophils, Absolute 10/08/2024 3.34  1.70 - 7.00 10*3/mm3 Final    Lymphocytes, Absolute 10/08/2024 1.22  0.70 - 3.10 10*3/mm3 Final    Monocytes, Absolute 10/08/2024 0.88  0.10 - 0.90 10*3/mm3 Final    Eosinophils, Absolute 10/08/2024 0.11  0.00 - 0.40 10*3/mm3 Final    Basophils, Absolute 10/08/2024 0.04  0.00 - 0.20 10*3/mm3 Final    Immature Grans, Absolute 10/08/2024 0.09 (H)  0.00 - 0.05 10*3/mm3 Final    nRBC 10/08/2024 0.0  0.0 - 0.2 /100 WBC Final    COVID19 10/08/2024 Not Detected  Not Detected - Ref. Range Final    Influenza A PCR 10/08/2024 Not Detected  Not Detected Final    Influenza B PCR 10/08/2024 Not Detected  Not Detected Final    RSV, PCR 10/08/2024 Not Detected  Not Detected Final    Creatine Kinase 10/08/2024 80  20 - 200 U/L Final   No results displayed because visit has over 200 results.      Admission on 09/27/2024, Discharged on 09/27/2024   Component Date Value Ref Range Status    QT Interval 09/27/2024 396  ms Final    QTC Interval 09/27/2024 472  ms Final    Glucose 09/27/2024 147 (H)  65 - 99 mg/dL Final    BUN 09/27/2024 48 (H)  8 - 23 mg/dL Final    Creatinine 09/27/2024 5.36 (H)  0.76 - 1.27 mg/dL Final    Sodium 09/27/2024 137  136 - 145 mmol/L Final    Potassium 09/27/2024 3.8  3.5 - 5.2 mmol/L Final    Slight hemolysis detected by analyzer. Result may be falsely elevated.    Chloride 09/27/2024 95 (L)  98 - 107 mmol/L Final    CO2 09/27/2024 29.4 (H)  22.0 - 29.0 mmol/L Final    Calcium 09/27/2024 9.7  8.6 - 10.5 mg/dL Final    Total Protein 09/27/2024 7.6  6.0 - 8.5 g/dL Final    Albumin 09/27/2024 4.2   3.5 - 5.2 g/dL Final    ALT (SGPT) 09/27/2024 12  1 - 41 U/L Final    AST (SGOT) 09/27/2024 15  1 - 40 U/L Final    Alkaline Phosphatase 09/27/2024 95  39 - 117 U/L Final    Total Bilirubin 09/27/2024 0.6  0.0 - 1.2 mg/dL Final    Globulin 09/27/2024 3.4  gm/dL Final    A/G Ratio 09/27/2024 1.2  g/dL Final    BUN/Creatinine Ratio 09/27/2024 9.0  7.0 - 25.0 Final    Anion Gap 09/27/2024 12.6  5.0 - 15.0 mmol/L Final    eGFR 09/27/2024 10.3 (L)  >60.0 mL/min/1.73 Final    <15 Indicative of kidney failure    HS Troponin T 09/27/2024 73 (C)  <22 ng/L Final    Magnesium 09/27/2024 1.6  1.6 - 2.4 mg/dL Final    Extra Tube 09/27/2024 Hold for add-ons.   Final    Auto resulted.    Extra Tube 09/27/2024 hold for add-on   Final    Auto resulted    Extra Tube 09/27/2024 Hold for add-ons.   Final    Auto resulted.    Extra Tube 09/27/2024 Hold for add-ons.   Final    Auto resulted    WBC 09/27/2024 7.60  3.40 - 10.80 10*3/mm3 Final    RBC 09/27/2024 3.53 (L)  4.14 - 5.80 10*6/mm3 Final    Hemoglobin 09/27/2024 10.7 (L)  13.0 - 17.7 g/dL Final    Hematocrit 09/27/2024 33.7 (L)  37.5 - 51.0 % Final    MCV 09/27/2024 95.5  79.0 - 97.0 fL Final    MCH 09/27/2024 30.3  26.6 - 33.0 pg Final    MCHC 09/27/2024 31.8  31.5 - 35.7 g/dL Final    RDW 09/27/2024 15.4  12.3 - 15.4 % Final    RDW-SD 09/27/2024 52.2  37.0 - 54.0 fl Final    MPV 09/27/2024 11.3  6.0 - 12.0 fL Final    Platelets 09/27/2024 111 (L)  140 - 450 10*3/mm3 Final    Neutrophil % 09/27/2024 61.9  42.7 - 76.0 % Final    Lymphocyte % 09/27/2024 24.9  19.6 - 45.3 % Final    Monocyte % 09/27/2024 10.3  5.0 - 12.0 % Final    Eosinophil % 09/27/2024 2.0  0.3 - 6.2 % Final    Basophil % 09/27/2024 0.4  0.0 - 1.5 % Final    Immature Grans % 09/27/2024 0.5  0.0 - 0.5 % Final    Neutrophils, Absolute 09/27/2024 4.71  1.70 - 7.00 10*3/mm3 Final    Lymphocytes, Absolute 09/27/2024 1.89  0.70 - 3.10 10*3/mm3 Final    Monocytes, Absolute 09/27/2024 0.78  0.10 - 0.90 10*3/mm3 Final     Eosinophils, Absolute 09/27/2024 0.15  0.00 - 0.40 10*3/mm3 Final    Basophils, Absolute 09/27/2024 0.03  0.00 - 0.20 10*3/mm3 Final    Immature Grans, Absolute 09/27/2024 0.04  0.00 - 0.05 10*3/mm3 Final    nRBC 09/27/2024 0.0  0.0 - 0.2 /100 WBC Final    COVID19 09/27/2024 Not Detected  Not Detected - Ref. Range Final    Influenza A PCR 09/27/2024 Not Detected  Not Detected Final    Influenza B PCR 09/27/2024 Not Detected  Not Detected Final    RSV, PCR 09/27/2024 Not Detected  Not Detected Final   Admission on 09/16/2024, Discharged on 09/16/2024   Component Date Value Ref Range Status    Uric Acid 09/16/2024 1.6 (L)  3.4 - 7.0 mg/dL Final    Extra Tube 09/16/2024 Hold for add-ons.   Final    Auto resulted.   Admission on 08/30/2024, Discharged on 08/30/2024   Component Date Value Ref Range Status    Glucose 08/30/2024 120 (H)  70 - 99 mg/dL Final    Serial Number: [id number]:  [id number]    Case Report 08/30/2024    Final                    Value:Medical Cytology Report                           Case: [id number]                                  Authorizing Provider:  Sandra Espinal MD           Collected:           08/30/2024 12:29 PM          Ordering Location:     [facility] Received:            09/03/2024 09:34 AM                                 SUIT[facility]                                                                       Pathologist:           Tiera Goldberg DO                                                       Specimens:   1) - Lung, Left Upper Lobe, FRED BAL                                                                 2) - Lung, Right Upper Lobe, RUL BAL                                                       Final Diagnosis 08/30/2024    Final                    Value:This result contains rich text formatting which cannot be displayed here.    Clinical Information 08/30/2024    Final                    Value:This result contains rich text formatting which cannot be displayed  here.    Gross Description 08/30/2024    Final                    Value:This result contains rich text formatting which cannot be displayed here.    Microscopic Description 08/30/2024    Final                    Value:This result contains rich text formatting which cannot be displayed here.    Fungus Culture 08/30/2024 No fungus isolated at 4 weeks   Final    Viral Culture, General 08/30/2024 No virus isolated.   Final    AFB Culture 08/30/2024 No AFB isolated at 6 weeks   Final    AFB Stain 08/30/2024 No acid fast bacilli seen on direct smear   Final    AFB Stain 08/30/2024 No acid fast bacilli seen on concentrated smear   Final    BAL Culture 08/30/2024 Heavy growth (4+) Normal respiratory lashae. No S. aureus or Pseudomonas aeruginosa detected. Final report.   Final    Gram Stain 08/30/2024 Rare (1+) WBCs seen   Final    Gram Stain 08/30/2024 Rare (1+) Gram positive cocci in pairs and chains   Final    Escherichia coli PCR 08/30/2024 Not Detected  Not Detected Final    Acinetobacter calcoaceticus-bianca* 08/30/2024 Not Detected  Not Detected Final    Enterobacter cloacae PCR 08/30/2024 Not Detected  Not Detected Final    Klebsiella oxytoca PCR 08/30/2024 Not Detected  Not Detected Final    Klebsiella pneumoniae group PCR 08/30/2024 Not Detected  Not Detected Final    Klebsiella aerogenes PCR 08/30/2024 Not Detected  Not Detected Final    Moraxella catarrhalis PCR 08/30/2024 Not Detected  Not Detected Final    Proteus species PCR 08/30/2024 Not Detected  Not Detected Final    Pseudomonas aeroginosa PCR 08/30/2024 Not Detected  Not Detected Final    Serratia marcescens PCR 08/30/2024 Not Detected  Not Detected Final    Staphylococcus aureus PCR 08/30/2024 Not Detected  Not Detected Final    Streptococcus pyogenes PCR 08/30/2024 Not Detected  Not Detected Final    Haemophilus influenzae PCR 08/30/2024 Not Detected  Not Detected Final    Streptococcus agalactiae PCR 08/30/2024 Not Detected  Not Detected Final     Streptococcus pneumoniae PCR 08/30/2024 Not Detected  Not Detected Final    Chlamydophila pneumoniae PCR 08/30/2024 Not Detected  Not Detected Final    Legionella pneumophilia PCR 08/30/2024 Not Detected  Not Detected Final    Mycoplasma pneumo by PCR 08/30/2024 Not Detected  Not Detected Final    ADENOVIRUS, PCR 08/30/2024 Not Detected  Not Detected Final    CTX-M Gene 08/30/2024 N/A  Not Detected, N/A Final    IMP Gene 08/30/2024 N/A  Not Detected, N/A Final    KPC Gene 08/30/2024 N/A  Not Detected, N/A Final    mecA/C and MREJ Gene 08/30/2024 N/A  Not Detected, N/A Final    NDM Gene 08/30/2024 N/A  Not Detected, N/A Final    OXA-48-like Gene 08/30/2024 N/A  Not Detected, N/A Final    VIM Gene 08/30/2024 N/A  Not Detected, N/A Final    Coronavirus 08/30/2024 Not Detected  Not Detected Final    Human Metapneumovirus 08/30/2024 Not Detected  Not Detected Final    Human Rhinovirus/Enterovirus 08/30/2024 Not Detected  Not Detected Final    Influenza A PCR 08/30/2024 Not Detected  Not Detected Final    Influenza B PCR 08/30/2024 Not Detected  Not Detected Final    RSV, PCR 08/30/2024 Not Detected  Not Detected Final    Parainfluenza virus PCR 08/30/2024 Not Detected  Not Detected Final    Source 08/30/2024 Comment   Final    BRONCHOALVEOLAR LAVAGE    Cytomegalovirus PCR 08/30/2024 Negative  Negative Final    -------------------ADDITIONAL INFORMATION-------------------  This test was developed and its performance characteristics  determined by [facility] in a manner consistent with CLIA  requirements. This test has not been cleared or approved by  the [place] Food and Drug Administration.    Lymphocytes, Fluid 08/30/2024 17  % Final    Neutrophils, Fluid 08/30/2024 66  % Final    Bronchial Lining Cells 08/30/2024 17  % Final    Fungus Culture 08/30/2024 No fungus isolated at 4 weeks   Final    Viral Culture, General 08/30/2024 No virus isolated.   Final    AFB Culture 08/30/2024 No AFB isolated at 6 weeks   Final    AFB  Stain 08/30/2024 No acid fast bacilli seen on direct smear   Final    AFB Stain 08/30/2024 No acid fast bacilli seen on concentrated smear   Final    BAL Culture 08/30/2024 >100,000 CFU/mL Normal respiratory lashae. No S. aureus or Pseudomonas aeruginosa detected. Final report.   Final    Gram Stain 08/30/2024 Rare (1+) WBCs seen   Final    Gram Stain 08/30/2024 Few (2+) Gram positive cocci in pairs and chains   Final    Escherichia coli PCR 08/30/2024 Not Detected  Not Detected Final    Acinetobacter calcoaceticus-bianca* 08/30/2024 Not Detected  Not Detected Final    Enterobacter cloacae PCR 08/30/2024 Not Detected  Not Detected Final    Klebsiella oxytoca PCR 08/30/2024 Not Detected  Not Detected Final    Klebsiella pneumoniae group PCR 08/30/2024 Not Detected  Not Detected Final    Klebsiella aerogenes PCR 08/30/2024 Not Detected  Not Detected Final    Moraxella catarrhalis PCR 08/30/2024 Not Detected  Not Detected Final    Proteus species PCR 08/30/2024 Not Detected  Not Detected Final    Pseudomonas aeroginosa PCR 08/30/2024 Not Detected  Not Detected Final    Serratia marcescens PCR 08/30/2024 Not Detected  Not Detected Final    Staphylococcus aureus PCR 08/30/2024 Not Detected  Not Detected Final    Streptococcus pyogenes PCR 08/30/2024 Not Detected  Not Detected Final    Haemophilus influenzae PCR 08/30/2024 Not Detected  Not Detected Final    Streptococcus agalactiae PCR 08/30/2024 Not Detected  Not Detected Final    Streptococcus pneumoniae PCR 08/30/2024 Not Detected  Not Detected Final    Chlamydophila pneumoniae PCR 08/30/2024 Not Detected  Not Detected Final    Legionella pneumophilia PCR 08/30/2024 Not Detected  Not Detected Final    Mycoplasma pneumo by PCR 08/30/2024 Not Detected  Not Detected Final    ADENOVIRUS, PCR 08/30/2024 Not Detected  Not Detected Final    CTX-M Gene 08/30/2024 N/A  Not Detected, N/A Final    IMP Gene 08/30/2024 N/A  Not Detected, N/A Final    KPC Gene 08/30/2024 N/A  Not  Detected, N/A Final    mecA/C and MREJ Gene 08/30/2024 N/A  Not Detected, N/A Final    NDM Gene 08/30/2024 N/A  Not Detected, N/A Final    OXA-48-like Gene 08/30/2024 N/A  Not Detected, N/A Final    VIM Gene 08/30/2024 N/A  Not Detected, N/A Final    Coronavirus 08/30/2024 Not Detected  Not Detected Final    Human Metapneumovirus 08/30/2024 Not Detected  Not Detected Final    Human Rhinovirus/Enterovirus 08/30/2024 Not Detected  Not Detected Final    Influenza A PCR 08/30/2024 Not Detected  Not Detected Final    Influenza B PCR 08/30/2024 Not Detected  Not Detected Final    RSV, PCR 08/30/2024 Not Detected  Not Detected Final    Parainfluenza virus PCR 08/30/2024 Not Detected  Not Detected Final    Source 08/30/2024 Comment   Final    BRONCHOALVEOLAR LAVAGE    Cytomegalovirus PCR 08/30/2024 Negative  Negative Final    -------------------ADDITIONAL INFORMATION-------------------  This test was developed and its performance characteristics  determined by [facility] in a manner consistent with CLIA  requirements. This test has not been cleared or approved by  the [place] Food and Drug Administration.    Lymphocytes, Fluid 08/30/2024 79  % Final    Neutrophils, Fluid 08/30/2024 9  % Final    Macrophage, Fluid 08/30/2024 12  % Final    Respiratory Culture 08/30/2024 Heavy growth (4+) Normal respiratory lashae. No S. aureus or Pseudomonas aeruginosa detected. Final report.   Final    Gram Stain 08/30/2024 Rare (1+) WBCs seen   Final    Gram Stain 08/30/2024 Rare (1+) Gram positive cocci in pairs and chains   Final    Gram Stain 08/30/2024 Rare (1+) Gram negative bacilli   Final   Office Visit on 07/17/2024   Component Date Value Ref Range Status    Hemoglobin A1C 07/16/2024 6.50 (H)  4.80 - 5.60 % Final    Total Cholesterol 07/16/2024 138  0 - 200 mg/dL Final    Triglycerides 07/16/2024 111  0 - 150 mg/dL Final    HDL Cholesterol 07/16/2024 56  40 - 60 mg/dL Final    LDL Cholesterol  07/16/2024 62  0 - 100 mg/dL Final     VLDL Cholesterol 07/16/2024 20  5 - 40 mg/dL Final    LDL/HDL Ratio 07/16/2024 1.07   Final    TSH 07/16/2024 6.400 (H)  0.270 - 4.200 uIU/mL Final   Admission on 07/16/2024, Discharged on 07/16/2024   Component Date Value Ref Range Status    WBC 07/16/2024 6.42  3.40 - 10.80 10*3/mm3 Final    RBC 07/16/2024 3.59 (L)  4.14 - 5.80 10*6/mm3 Final    Hemoglobin 07/16/2024 10.6 (L)  13.0 - 17.7 g/dL Final    Hematocrit 07/16/2024 34.5 (L)  37.5 - 51.0 % Final    MCV 07/16/2024 96.1  79.0 - 97.0 fL Final    MCH 07/16/2024 29.5  26.6 - 33.0 pg Final    MCHC 07/16/2024 30.7 (L)  31.5 - 35.7 g/dL Final    RDW 07/16/2024 16.4 (H)  12.3 - 15.4 % Final    RDW-SD 07/16/2024 54.1 (H)  37.0 - 54.0 fl Final    MPV 07/16/2024 10.1  6.0 - 12.0 fL Final    Platelets 07/16/2024 128 (L)  140 - 450 10*3/mm3 Final    Neutrophil % 07/16/2024 54.5  42.7 - 76.0 % Final    Lymphocyte % 07/16/2024 33.8  19.6 - 45.3 % Final    Monocyte % 07/16/2024 8.4  5.0 - 12.0 % Final    Eosinophil % 07/16/2024 1.9  0.3 - 6.2 % Final    Basophil % 07/16/2024 0.5  0.0 - 1.5 % Final    Immature Grans % 07/16/2024 0.9 (H)  0.0 - 0.5 % Final    Neutrophils, Absolute 07/16/2024 3.50  1.70 - 7.00 10*3/mm3 Final    Lymphocytes, Absolute 07/16/2024 2.17  0.70 - 3.10 10*3/mm3 Final    Monocytes, Absolute 07/16/2024 0.54  0.10 - 0.90 10*3/mm3 Final    Eosinophils, Absolute 07/16/2024 0.12  0.00 - 0.40 10*3/mm3 Final    Basophils, Absolute 07/16/2024 0.03  0.00 - 0.20 10*3/mm3 Final    Immature Grans, Absolute 07/16/2024 0.06 (H)  0.00 - 0.05 10*3/mm3 Final    nRBC 07/16/2024 0.0  0.0 - 0.2 /100 WBC Final    Glucose 07/16/2024 159 (H)  65 - 99 mg/dL Final    BUN 07/16/2024 59 (H)  8 - 23 mg/dL Final    Creatinine 07/16/2024 5.35 (H)  0.76 - 1.27 mg/dL Final    Sodium 07/16/2024 137  136 - 145 mmol/L Final    Potassium 07/16/2024 5.5 (H)  3.5 - 5.2 mmol/L Final    Chloride 07/16/2024 102  98 - 107 mmol/L Final    CO2 07/16/2024 22.1  22.0 - 29.0 mmol/L Final     Calcium 07/16/2024 8.9  8.6 - 10.5 mg/dL Final    BUN/Creatinine Ratio 07/16/2024 11.0  7.0 - 25.0 Final    Anion Gap 07/16/2024 12.9  5.0 - 15.0 mmol/L Final    eGFR 07/16/2024 10.3 (L)  >60.0 mL/min/1.73 Final    <15 Indicative of kidney failure    Glucose 07/16/2024 133 (H)  70 - 99 mg/dL Final    Serial Number: [id number]:  [id number]   There may be more visits with results that are not included.       EKG Results:  No orders to display       Imaging Results:  MRI Brain Without Contrast    Result Date: 11/16/2024  Electronically signed by Tati Jessica MD on 11-16-24 at 0156    CT Head Without Contrast    Result Date: 11/13/2024  1. No acute intracranial abnormality is identified with no significant change when compared to this patient's prior head CT on 10/08/2024. 2. There is mild small vessel disease in the cerebral white matter and mild cerebral atrophy. There are lens implants in place in the globes from previous bilateral cataract surgery. The remainder of the head CT is normal with no discernible acute completed infarct and no intracranial hemorrhage identified and the etiology of the patient's altered mental status, confusion and agitation is not established on this exam. If clinical symptoms warrant, an MRI of the brain could be obtained for a more comprehensive assessment.  Radiation dose reduction techniques were utilized, including automated exposure control and exposure modulation based on body size.    This report was finalized on 11/13/2024 6:17 AM by Dr. Cristian Ordoñez M.D on Workstation: [id number]      CT Chest Without Contrast Diagnostic    Result Date: 11/12/2024   1. Enlarged small to moderate right pleural effusion and new trace left pleural effusion with associated compressive atelectasis of the right lung. Dependent groundglass opacities and interstitial thickening in each lung could reflect atelectasis, mild pulmonary edema, or atypical pneumonia. 2. Stable cardiomegaly with  severe calcified coronary artery disease and stable enlargement the main pulmonary artery that could reflect pulmonary arterial hypertension. 3. Lucent destructive endplate changes at T5-T6 consistent with discitis and osteomyelitis.  This report was finalized on 11/12/2024 10:51 AM by Chencho Casanova MD on Workstation: [id number]      MRI Thoracic Spine With & Without Contrast    Result Date: 11/2/2024  The study is significantly degraded by patient motion. Additionally, the lack of contrast reduces the sensitivity of the study. However, no gross evidence of discitis or osteomyelitis is appreciated. There is no evidence of gross cord signal abnormality or of cord compression. 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 See above.   MRI EXAMINATION OF THE THORACIC SPINE WITH AND WITHOUT CONTRAST:  The study is degraded by patient motion.  There is signal loss appreciated involving the T5 and T6 vertebral bodies on the sagittal T1 sequence. On the sagittal T2 sequence there is increased signal intensity involving the disc space at T5/T6 and to a lesser extent the T5 and T6 vertebral bodies. There is adjacent edema/inflammation also evident extending from T4-T7. These findings are new versus 9/29/2024. The CT examination of the chest from 10/31/2024 demonstrates new endplate irregularity as compared to the CT examination of the chest performed on 10/8/2024. The findings are all consistent with discitis/osteomyelitis. There is moderate loss of disc height and partial fusion at T11/T11/T12. A mild compression deformity involving T12 is noted, unchanged versus 9/29/2024.  There is no evidence of cord signal abnormality on the sagittal or axial T2 sequences.  After contrast administration there was enhancement of the T5 and T6 vertebral bodies. Epidural enhancement is  also appreciated posterior to the T5 and T6 vertebral bodies. No convincing epidural abscess is identified. There is enhancing soft tissue appreciated extending into the neural foramen at T5/T6 bilaterally, more prominent on the right.  IMPRESSION: The study is degraded by patient motion however discitis is appreciated at T5/T6 with osteomyelitis present involving the T5 and T6 vertebral bodies. Paraspinal edema and enhancement consistent with infection/inflammatory change is appreciated as described above and there is also epidural enhancement posterior to the T5 and T6 vertebral bodies. Enhancement extends into the neural foramen bilaterally at T5/T6, more prominent on the right. There is no evidence of cord compression or of gross cord signal abnormality.   MRI EXAMINATION OF THE LUMBAR SPINE WITH AND WITHOUT CONTRAST:  The study is degraded by patient motion.  There is a mild compression deformity involving the T12 and L3 without evidence of marrow edema. There is mild bony retropulsion of the superior endplate of T12 and mild to moderate retropulsion involving the superior aspect of L3, unchanged. The sagittal T2 sequence demonstrates a mild degree of increased signal intensity involving the disc at L1-L2 which is new versus 9/29/2024. There is a mild degree of increased signal intensity involving the disc at L5-S1 also new versus prior examination. There is partial visualization of a plane of T2 hyperintensity anterior to the sacrum on the sagittal T2 sequence. The conus is at L1 and the caudal aspect the spinal cord appears unremarkable.  L1-L2: A mild central broad-based disc osteophyte complex is present which is more prominent to the left. Mild facet degenerative disease is present bilaterally.  L2-L3: A mild central broad-based disc osteophyte complex is present resulting in mild flattening of the ventral surface of the thecal sac. Mild foraminal stenosis is present bilaterally secondary to extension of a  small disc osteophyte complex into the neural foramen.  L3-L4: A small central disc osteophyte complex is present with zones of herniation. Mild facet degenerative disease is present bilaterally. Mild and mild to moderate foraminal stenosis is present on the right and left respectively secondary to extension of a small disc osteophyte complex into the neural foramen.  L4-L5: Mild to moderate facet degenerative disease is present bilaterally. Mild central broad-based disc osteophyte complex is present which results in mild flattening of the ventral surface of the thecal sac. Mild to moderate lateral recess narrowing is present bilaterally. Mild foraminal stenosis is present bilaterally, more prominent on the right secondary to extension of a small disc osteophyte complex into the neural foramen.  L5-S1: Moderate facet degenerative disease is present bilaterally. A broad-based disc osteophyte complex is present which is more prominent to the right. There is moderate to severe lateral recess narrowing on the right similar appearances compared to prior examination. Moderate to severe foraminal stenosis is also present bilaterally secondary to loss of disc height and extension of the disc osteophyte complex into the neural foramen, similar in appearance as compared to the prior examination. After contrast administration there was enhancement of the disc at L1-L2. There is mild enhancement involving the disc at L5-S1.  IMPRESSION: 1.  There is new mild T2 hyperintensity involving the disc at the level of L1-L2 as compared to the MRI examination of the lumbar spine performed on 9/29/2024. There is also milder T2 hyperintensity and enhancement at L5-S1 which is also new versus a prior MRI examination. The findings are suspicious for mild discitis were partially treated discitis. There is no evidence of an epidural abscess. Clinical correlation and a follow-up MRI examination of the lumbar spine is recommended. 2.  Mild  compression deformities involving T12 and L3 are appreciated with bony retropulsion, unchanged. There is no evidence of a new compression fracture. 3.  Multilevel degenerative disease involving lumbar spine is noted as described above. The study is hampered by patient motion but there is no evidence of a focal herniation. This includes moderate to severe lateral recess narrowing to the right at L5-S1 and moderate to severe foraminal stenosis bilaterally at L5-S1. See above. 4.  There is a thin plane of edema and enhancement anterior to the sacrum. This is only partially visualized. Further evaluation could be performed with a MRI examination of the pelvis with and without contrast.   This report was finalized on 11/2/2024 2:06 PM by Dr. Donovan Richey M.D on Workstation: [id number]      MRI Lumbar Spine With & Without Contrast    Result Date: 11/2/2024  The study is significantly degraded by patient motion. Additionally, the lack of contrast reduces the sensitivity of the study. However, no gross evidence of discitis or osteomyelitis is appreciated. There is no evidence of gross cord signal abnormality or of cord compression. 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 See above.   MRI EXAMINATION OF THE THORACIC SPINE WITH AND WITHOUT CONTRAST:  The study is degraded by patient motion.  There is signal loss appreciated involving the T5 and T6 vertebral bodies on the sagittal T1 sequence. On the sagittal T2 sequence there is increased signal intensity involving the disc space at T5/T6 and to a lesser extent the T5 and T6 vertebral bodies. There is adjacent edema/inflammation also evident extending from T4-T7. These findings are new versus 9/29/2024. The CT examination of the chest from 10/31/2024 demonstrates new endplate irregularity as compared to the CT  examination of the chest performed on 10/8/2024. The findings are all consistent with discitis/osteomyelitis. There is moderate loss of disc height and partial fusion at T11/T11/T12. A mild compression deformity involving T12 is noted, unchanged versus 9/29/2024.  There is no evidence of cord signal abnormality on the sagittal or axial T2 sequences.  After contrast administration there was enhancement of the T5 and T6 vertebral bodies. Epidural enhancement is also appreciated posterior to the T5 and T6 vertebral bodies. No convincing epidural abscess is identified. There is enhancing soft tissue appreciated extending into the neural foramen at T5/T6 bilaterally, more prominent on the right.  IMPRESSION: The study is degraded by patient motion however discitis is appreciated at T5/T6 with osteomyelitis present involving the T5 and T6 vertebral bodies. Paraspinal edema and enhancement consistent with infection/inflammatory change is appreciated as described above and there is also epidural enhancement posterior to the T5 and T6 vertebral bodies. Enhancement extends into the neural foramen bilaterally at T5/T6, more prominent on the right. There is no evidence of cord compression or of gross cord signal abnormality.   MRI EXAMINATION OF THE LUMBAR SPINE WITH AND WITHOUT CONTRAST:  The study is degraded by patient motion.  There is a mild compression deformity involving the T12 and L3 without evidence of marrow edema. There is mild bony retropulsion of the superior endplate of T12 and mild to moderate retropulsion involving the superior aspect of L3, unchanged. The sagittal T2 sequence demonstrates a mild degree of increased signal intensity involving the disc at L1-L2 which is new versus 9/29/2024. There is a mild degree of increased signal intensity involving the disc at L5-S1 also new versus prior examination. There is partial visualization of a plane of T2 hyperintensity anterior to the sacrum on the sagittal T2  sequence. The conus is at L1 and the caudal aspect the spinal cord appears unremarkable.  L1-L2: A mild central broad-based disc osteophyte complex is present which is more prominent to the left. Mild facet degenerative disease is present bilaterally.  L2-L3: A mild central broad-based disc osteophyte complex is present resulting in mild flattening of the ventral surface of the thecal sac. Mild foraminal stenosis is present bilaterally secondary to extension of a small disc osteophyte complex into the neural foramen.  L3-L4: A small central disc osteophyte complex is present with zones of herniation. Mild facet degenerative disease is present bilaterally. Mild and mild to moderate foraminal stenosis is present on the right and left respectively secondary to extension of a small disc osteophyte complex into the neural foramen.  L4-L5: Mild to moderate facet degenerative disease is present bilaterally. Mild central broad-based disc osteophyte complex is present which results in mild flattening of the ventral surface of the thecal sac. Mild to moderate lateral recess narrowing is present bilaterally. Mild foraminal stenosis is present bilaterally, more prominent on the right secondary to extension of a small disc osteophyte complex into the neural foramen.  L5-S1: Moderate facet degenerative disease is present bilaterally. A broad-based disc osteophyte complex is present which is more prominent to the right. There is moderate to severe lateral recess narrowing on the right similar appearances compared to prior examination. Moderate to severe foraminal stenosis is also present bilaterally secondary to loss of disc height and extension of the disc osteophyte complex into the neural foramen, similar in appearance as compared to the prior examination. After contrast administration there was enhancement of the disc at L1-L2. There is mild enhancement involving the disc at L5-S1.  IMPRESSION: 1.  There is new mild T2  hyperintensity involving the disc at the level of L1-L2 as compared to the MRI examination of the lumbar spine performed on 9/29/2024. There is also milder T2 hyperintensity and enhancement at L5-S1 which is also new versus a prior MRI examination. The findings are suspicious for mild discitis were partially treated discitis. There is no evidence of an epidural abscess. Clinical correlation and a follow-up MRI examination of the lumbar spine is recommended. 2.  Mild compression deformities involving T12 and L3 are appreciated with bony retropulsion, unchanged. There is no evidence of a new compression fracture. 3.  Multilevel degenerative disease involving lumbar spine is noted as described above. The study is hampered by patient motion but there is no evidence of a focal herniation. This includes moderate to severe lateral recess narrowing to the right at L5-S1 and moderate to severe foraminal stenosis bilaterally at L5-S1. See above. 4.  There is a thin plane of edema and enhancement anterior to the sacrum. This is only partially visualized. Further evaluation could be performed with a MRI examination of the pelvis with and without contrast.   This report was finalized on 11/2/2024 2:06 PM by Dr. Donovan Richey M.D on Workstation: [id number]      MRI Cervical Spine Without Contrast    Result Date: 11/2/2024  The study is significantly degraded by patient motion. Additionally, the lack of contrast reduces the sensitivity of the study. However, no gross evidence of discitis or osteomyelitis is appreciated. There is no evidence of gross cord signal abnormality or of cord compression. 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 See above.   MRI EXAMINATION OF THE THORACIC SPINE WITH AND WITHOUT CONTRAST:  The study is degraded by patient motion.  There is signal  loss appreciated involving the T5 and T6 vertebral bodies on the sagittal T1 sequence. On the sagittal T2 sequence there is increased signal intensity involving the disc space at T5/T6 and to a lesser extent the T5 and T6 vertebral bodies. There is adjacent edema/inflammation also evident extending from T4-T7. These findings are new versus 9/29/2024. The CT examination of the chest from 10/31/2024 demonstrates new endplate irregularity as compared to the CT examination of the chest performed on 10/8/2024. The findings are all consistent with discitis/osteomyelitis. There is moderate loss of disc height and partial fusion at T11/T11/T12. A mild compression deformity involving T12 is noted, unchanged versus 9/29/2024.  There is no evidence of cord signal abnormality on the sagittal or axial T2 sequences.  After contrast administration there was enhancement of the T5 and T6 vertebral bodies. Epidural enhancement is also appreciated posterior to the T5 and T6 vertebral bodies. No convincing epidural abscess is identified. There is enhancing soft tissue appreciated extending into the neural foramen at T5/T6 bilaterally, more prominent on the right.  IMPRESSION: The study is degraded by patient motion however discitis is appreciated at T5/T6 with osteomyelitis present involving the T5 and T6 vertebral bodies. Paraspinal edema and enhancement consistent with infection/inflammatory change is appreciated as described above and there is also epidural enhancement posterior to the T5 and T6 vertebral bodies. Enhancement extends into the neural foramen bilaterally at T5/T6, more prominent on the right. There is no evidence of cord compression or of gross cord signal abnormality.   MRI EXAMINATION OF THE LUMBAR SPINE WITH AND WITHOUT CONTRAST:  The study is degraded by patient motion.  There is a mild compression deformity involving the T12 and L3 without evidence of marrow edema. There is mild bony retropulsion of the superior  endplate of T12 and mild to moderate retropulsion involving the superior aspect of L3, unchanged. The sagittal T2 sequence demonstrates a mild degree of increased signal intensity involving the disc at L1-L2 which is new versus 9/29/2024. There is a mild degree of increased signal intensity involving the disc at L5-S1 also new versus prior examination. There is partial visualization of a plane of T2 hyperintensity anterior to the sacrum on the sagittal T2 sequence. The conus is at L1 and the caudal aspect the spinal cord appears unremarkable.  L1-L2: A mild central broad-based disc osteophyte complex is present which is more prominent to the left. Mild facet degenerative disease is present bilaterally.  L2-L3: A mild central broad-based disc osteophyte complex is present resulting in mild flattening of the ventral surface of the thecal sac. Mild foraminal stenosis is present bilaterally secondary to extension of a small disc osteophyte complex into the neural foramen.  L3-L4: A small central disc osteophyte complex is present with zones of herniation. Mild facet degenerative disease is present bilaterally. Mild and mild to moderate foraminal stenosis is present on the right and left respectively secondary to extension of a small disc osteophyte complex into the neural foramen.  L4-L5: Mild to moderate facet degenerative disease is present bilaterally. Mild central broad-based disc osteophyte complex is present which results in mild flattening of the ventral surface of the thecal sac. Mild to moderate lateral recess narrowing is present bilaterally. Mild foraminal stenosis is present bilaterally, more prominent on the right secondary to extension of a small disc osteophyte complex into the neural foramen.  L5-S1: Moderate facet degenerative disease is present bilaterally. A broad-based disc osteophyte complex is present which is more prominent to the right. There is moderate to severe lateral recess narrowing on the  right similar appearances compared to prior examination. Moderate to severe foraminal stenosis is also present bilaterally secondary to loss of disc height and extension of the disc osteophyte complex into the neural foramen, similar in appearance as compared to the prior examination. After contrast administration there was enhancement of the disc at L1-L2. There is mild enhancement involving the disc at L5-S1.  IMPRESSION: 1.  There is new mild T2 hyperintensity involving the disc at the level of L1-L2 as compared to the MRI examination of the lumbar spine performed on 9/29/2024. There is also milder T2 hyperintensity and enhancement at L5-S1 which is also new versus a prior MRI examination. The findings are suspicious for mild discitis were partially treated discitis. There is no evidence of an epidural abscess. Clinical correlation and a follow-up MRI examination of the lumbar spine is recommended. 2.  Mild compression deformities involving T12 and L3 are appreciated with bony retropulsion, unchanged. There is no evidence of a new compression fracture. 3.  Multilevel degenerative disease involving lumbar spine is noted as described above. The study is hampered by patient motion but there is no evidence of a focal herniation. This includes moderate to severe lateral recess narrowing to the right at L5-S1 and moderate to severe foraminal stenosis bilaterally at L5-S1. See above. 4.  There is a thin plane of edema and enhancement anterior to the sacrum. This is only partially visualized. Further evaluation could be performed with a MRI examination of the pelvis with and without contrast.   This report was finalized on 11/2/2024 2:06 PM by Dr. Donovan Richey M.D on Workstation: [id number]      CT Abdomen Pelvis With Contrast    Result Date: 10/31/2024  Impression: Motion-degraded exam CT CHEST: 1. New irregular endplate changes at T5-T6 since 10/8/2024 comparison, appearance suspicious for discitis/osteomyelitis.  Adjacent fat stranding in the posterior mediastinum without organized/drainable fluid collection. Consider infectious disease consultation and further assessment with MRI of thoracic spine without and with IV contrast (if possible). 2. Mild patchy and consolidative airspace opacities in the right lower lobe with small adjacent pleural effusion new from previous comparison. Differential includes atelectasis versus early developing infection. Recommend clinical correlation. 3. Cardiomegaly. Aortic and coronary atherosclerotic disease. 4. Dilated main pulmonary artery which can be seen with pulmonary arterial hypertension. 5. Fluid/debris in the mid thoracic esophagus. Recommend aspiration precautions. CT ABDOMEN/PELVIS: 1. No convincing acute intra-abdominal process. Specifically no acute GI related abnormality by CT. 2. Nonobstructing 1.1 cm stone in the right renal pelvis similar to prior. No hydronephrosis. Atrophic kidneys with multiple renal cysts related to chronic renal disease and/or dialysis. 3. Mild body wall edema. 4. Other chronic/ancillary findings as above. Findings and recommendations regarding suspected discitis osteomyelitis communicated over the phone with patient's nurse (Patricia Rios) at 8:21 p.m. 10/31/2024. Electronically Signed: Lorne Bustamante MD  10/31/2024 8:22 PM EDT  Workstation ID: [id number]    CT Chest With Contrast Diagnostic    Result Date: 10/31/2024  Impression: Motion-degraded exam CT CHEST: 1. New irregular endplate changes at T5-T6 since 10/8/2024 comparison, appearance suspicious for discitis/osteomyelitis. Adjacent fat stranding in the posterior mediastinum without organized/drainable fluid collection. Consider infectious disease consultation and further assessment with MRI of thoracic spine without and with IV contrast (if possible). 2. Mild patchy and consolidative airspace opacities in the right lower lobe with small adjacent pleural effusion new from previous comparison.  Differential includes atelectasis versus early developing infection. Recommend clinical correlation. 3. Cardiomegaly. Aortic and coronary atherosclerotic disease. 4. Dilated main pulmonary artery which can be seen with pulmonary arterial hypertension. 5. Fluid/debris in the mid thoracic esophagus. Recommend aspiration precautions. CT ABDOMEN/PELVIS: 1. No convincing acute intra-abdominal process. Specifically no acute GI related abnormality by CT. 2. Nonobstructing 1.1 cm stone in the right renal pelvis similar to prior. No hydronephrosis. Atrophic kidneys with multiple renal cysts related to chronic renal disease and/or dialysis. 3. Mild body wall edema. 4. Other chronic/ancillary findings as above. Findings and recommendations regarding suspected discitis osteomyelitis communicated over the phone with patient's nurse (Patricia Rios) at 8:21 p.m. 10/31/2024. Electronically Signed: Lorne Bustamante MD  10/31/2024 8:22 PM EDT  Workstation ID: [id number]    XR Chest 1 View    Result Date: 10/31/2024  Impression: Poor inspiration with mild bibasilar atelectasis, greatest on the left. Electronically Signed: Lois Balbuena MD  10/31/2024 8:42 AM EDT  Workstation ID: [id number]    XR Shoulder 2+ View Left    Result Date: 10/28/2024  Mild AC joint arthropathy. No acute findings in the shoulder. Electronically Signed: Cristian Tiwari MD  10/28/2024 10:08 PM EDT  Workstation ID: [id number]    XR Chest 1 View    Result Date: 10/27/2024  Stable cardiac enlargement with mild vascular congestion. Electronically Signed: Cristian Tiwari MD  10/27/2024 4:12 AM EDT  Workstation ID: [id number]    CT Chest Without Contrast Diagnostic    Result Date: 10/8/2024  Impression: 1.There are some groundglass interstitial changes more peripherally in the lungs which has been suggested and basilar regions that could be reflective of more chronic interstitial change. 2.Atherosclerotic vascular calcification including coronary artery calcification.  3.Bilateral cortical renal cyst thinning with bilateral renal lesions suggestive of cysts which could relate to the patient's renal function. 4.Bilateral gynecomastia 5.Wedge compression deformity T12 unchanged with partial fusion of the T11-T12 disc space Electronically Signed: Aureliano Jeffries MD  10/8/2024 4:43 PM EDT  Workstation ID: [id number]    CT Head Without Contrast    Result Date: 10/8/2024  Impression: No acute intracranial finding. Electronically Signed: Taz Negron  10/8/2024 4:38 PM EDT  Workstation ID: [id number]    XR Chest 1 View    Result Date: 10/8/2024  Impression: 1.Cardiomegaly with mild pulmonary vascular congestion. 2.Diminished lung volumes with elevated right hemidiaphragm. Electronically Signed: Shaq Scherer MD  10/8/2024 3:29 PM EDT  Workstation ID: [id number]    XR Chest 1 View    Result Date: 9/30/2024  Impression: Findings of pulmonary edema with trace pleural effusions. Electronically Signed: Solange Iverson MD  9/30/2024 11:50 AM EDT  Workstation ID: [id number]    MRI Lumbar Spine Without Contrast    Result Date: 9/29/2024  Impression:  1. Old L3 compression fracture with mild retropulsion. No acute fractures are identified.  2. Degenerative change throughout the lumbar spine with areas of mild/moderate central canal and foraminal stenosis.    Electronically Signed By-EPIFANIO RENTERIA MD On:9/29/2024 12:42 PM      MRI Thoracic Spine Without Contrast    Result Date: 9/29/2024  Impression: Degenerative change. No acute findings.    Electronically Signed By-EPIFANIO RENTERIA MD On:9/29/2024 12:33 PM      CT Abdomen Pelvis Without Contrast    Result Date: 9/28/2024  1. No bowel obstruction or evidence of colitis. There is colonic diverticulosis without diverticulitis. Surgically absent appendix. 2. Large calculus is again identified in the right renal pelvis, not significantly changed. No hydronephrosis on either side. There is bilateral renal cortical thinning with bilateral renal cysts. 3. Interval  removal of a peritoneal dialysis catheter. Electronically Signed: Cristian Tiwari MD  9/28/2024 2:29 AM EDT  Workstation ID: [id number]    CT Chest Without Contrast Diagnostic    Result Date: 9/28/2024  1. Interval resolution of previously described 10 mm left lower lobe nodule, presumed infectious or inflammatory. 2. Groundglass changes in the lower lobes, most likely secondary to some mild atelectasis. Pneumonitis is considered less likely. No dense consolidations. No clear evidence of volume overload. 3. Cardiomegaly with atherosclerotic disease and coronary artery disease. Electronically Signed: Cristian Tiwari MD  9/28/2024 2:17 AM EDT  Workstation ID: [id number]    XR Chest 1 View    Result Date: 9/28/2024  No acute cardiopulmonary findings. Electronically Signed: Cristian Tiwari MD  9/28/2024 1:33 AM EDT  Workstation ID: [id number]    XR Chest 1 View    Result Date: 9/27/2024  Impression: No acute cardiopulmonary findings. Electronically Signed: Claus Sánchez MD  9/27/2024 5:44 PM EDT  Workstation ID: [id number]    XR Chest 1 View    Result Date: 9/16/2024  Impression: Low lung volumes with no focal airspace consolidation. Electronically Signed: Devante Lang MD  9/16/2024 8:21 PM EDT  Workstation ID: [id number]    XR Ankle 3+ View Right    Result Date: 9/16/2024  Impression: No acute osseous abnormality. Electronically Signed: Taz Negron  9/16/2024 7:40 PM EDT  Workstation ID: [id number]    XR Sacrum & Coccyx    Result Date: 9/16/2024  Impression: No definite evidence of acute fracture of the sacrum or coccyx Electronically Signed: Devante Lang MD  9/16/2024 7:39 PM EDT  Workstation ID: [id number]    CT Chest Hi Resolution Diagnostic    Result Date: 8/23/2024  Impression: 1. Mild bilateral lower lobe basilar chronic interstitial lung disease. 2. Mild cylindrical bronchiectasis in the lower lobes. 3. 10 mm nodule in the anterior medial basilar segment of the left lower lobe not seen on the study from 3 months ago. I  would recommend follow-up as discussed below. This is probably an inflammatory nodule. I would favor a follow-up CT of the chest in 3  months versus a PET/CT exam. 4. Tree in the bud densities mainly in the peripheral upper lobes consistent with a nonspecific infectious/inflammatory process. 5. Additional findings as noted above. Indeterminate solid pulmonary nodule measuring 10 mm. In a patient of unknown risk level with a solid nodule >8 mm, consider PET/CT or tissue sampling, vs. CT at 3 months. Electronically Signed: Robbie Elise MD  8/23/2024 4:38 PM EDT  Workstation ID: [id number]        Assessment & Plan   Diagnoses and all orders for this visit:    1. Hallucinations (Primary)  -     QUEtiapine (SEROquel) 25 MG tablet; 25 mg (1 tab) bid prn agitation. 50 mg (2 tab) qhs for insomnia.  Dispense: 120 tablet; Refill: 2    2. Delirium due to another medical condition  -     QUEtiapine (SEROquel) 25 MG tablet; 25 mg (1 tab) bid prn agitation. 50 mg (2 tab) qhs for insomnia.  Dispense: 120 tablet; Refill: 2    3. Agitation  -     QUEtiapine (SEROquel) 25 MG tablet; 25 mg (1 tab) bid prn agitation. 50 mg (2 tab) qhs for insomnia.  Dispense: 120 tablet; Refill: 2    4. CKD (chronic kidney disease), stage IV    5. Stage 5 chronic kidney disease    6. Bipolar disorder, current episode depressed, severe, with psychotic features        Visit Diagnoses:    ICD-10-CM ICD-9-CM   1. Hallucinations  R44.3 780.1   2. Delirium due to another medical condition  F05 293.0   3. Agitation  R45.1 307.9   4. CKD (chronic kidney disease), stage IV  N18.4 585.4   5. Stage 5 chronic kidney disease  N18.5 585.5   6. Bipolar disorder, current episode depressed, severe, with psychotic features  F31.5 296.54     11/27/2024: No previous dx of dementia. Pt was fairly clear prior to admission. Very likely delirium. Stop abilify (ineffective), start quetiapine as far more effective for agitation, insomnia. Also not renally dosed. Recent QTC  reassuring. Continue zoloft.  Counseled her daughter and her  on constantly keeping patient oriented and awake during the day, and encouraged sleep at night, as disrupted circadian rhythms can worsen delirium.  2 weeks    PLAN:  Safety: No acute safety concerns  Therapy: None  Risk Assessment: Risk of self-harm acutely is moderate.  Risk factors include anxiety disorder, mood disorder, some AODA hx, and recent psychosocial stressors (pandemic). Protective factors include no family history, denies access to guns/weapons, no present SI, no history of suicide attempts or self-harm in the past, healthcare seeking, future orientation, willingness to engage in care.  Risk of self-harm chronically is also moderate, but could be further elevated in the event of treatment noncompliance and/or AODA.  Meds:  STOP abilify 2 mg bid.  START quetiapine 25 mg BID PRN agitation; 50 mg qhs. Risks, benefits, alternatives discussed with patient including nausea and vomiting, GI upset, sedation, dizziness, falls, akathisia, hypotension, increased appetite, lowering of seizure threshold, theoretical risk of tardive dyskinesia, extrapyramidal symptoms, movement issues, restless legs syndrome. Use care when operating vehicle, vessel, or machine. After discussion of these risks and benefits, the patient voiced understanding and agreed to proceed.  CONTINUE sertraline 100 mg qday. Risks, benefits, alternatives discussed with patient including GI upset, nausea vomiting diarrhea, hyponatremia, theoretical decrease of seizure threshold predisposing the patient to a slightly higher seizure risk, headaches, sexual dysfunction, serotonin syndrome, bleeding risk, increased suicidality in patients 24 years and younger, switching to amirah/hypomania.  After discussion of these risks and benefits, the patient voiced understanding and agreed to proceed.  Labs: none, may need repeat EKG    Patient screened positive for depression based on a PHQ-9  score of  on . Follow-up recommendations include: Prescribed antidepressant medication treatment and Suicide Risk Assessment performed.           TREATMENT PLAN/GOALS: Continue supportive psychotherapy efforts and medications as indicated. Treatment and medication options discussed during today's visit. Patient acknowledged and verbally consented to continue with current treatment plan and was educated on the importance of compliance with treatment and follow-up appointments.    MEDICATION ISSUES:  WENDY reviewed as expected.  Discussed medication options and treatment plan of prescribed medication as well as the risks, benefits, and side effects including potential falls, possible impaired driving and metabolic adversities among others. Patient is agreeable to call the office with any worsening of symptoms or onset of side effects. Patient is agreeable to call 911 or go to the nearest ER should he/she begin having SI/HI. No medication side effects or related complaints today.     MEDS ORDERED DURING VISIT:  New Medications Ordered This Visit   Medications    QUEtiapine (SEROquel) 25 MG tablet     Si mg (1 tab) bid prn agitation. 50 mg (2 tab) qhs for insomnia.     Dispense:  120 tablet     Refill:  2     Please dc abilify. Thank you for the help. Please call with questions: [phone].       Return in about 2 weeks (around 2024) for 3:20.         This document has been electronically signed by Areli Conner MD  2024 13:34 EST    Dictated Utilizing Dragon Dictation: Part of this note may be an electronic transcription/translation of spoken language to printed text using the Dragon Dictation System.

## 2024-12-01 ENCOUNTER — APPOINTMENT (OUTPATIENT)
Dept: CT IMAGING | Facility: HOSPITAL | Age: 79
End: 2024-12-01
Payer: MEDICARE

## 2024-12-01 ENCOUNTER — APPOINTMENT (OUTPATIENT)
Dept: GENERAL RADIOLOGY | Facility: HOSPITAL | Age: 79
End: 2024-12-01
Payer: MEDICARE

## 2024-12-01 ENCOUNTER — HOSPITAL ENCOUNTER (EMERGENCY)
Facility: HOSPITAL | Age: 79
Discharge: HOME | End: 2024-12-01
Attending: EMERGENCY MEDICINE | Admitting: EMERGENCY MEDICINE
Payer: MEDICARE

## 2024-12-01 VITALS
RESPIRATION RATE: 18 BRPM | BODY MASS INDEX: 24.57 KG/M2 | HEART RATE: 56 BPM | OXYGEN SATURATION: 98 % | SYSTOLIC BLOOD PRESSURE: 91 MMHG | HEIGHT: 67 IN | DIASTOLIC BLOOD PRESSURE: 75 MMHG | TEMPERATURE: 97.5 F | WEIGHT: 156.53 LBS

## 2024-12-01 DIAGNOSIS — S09.90XA INJURY OF HEAD, INITIAL ENCOUNTER: Primary | ICD-10-CM

## 2024-12-01 DIAGNOSIS — S00.03XA HEMATOMA OF SCALP, INITIAL ENCOUNTER: ICD-10-CM

## 2024-12-01 DIAGNOSIS — J81.0 ACUTE PULMONARY EDEMA: ICD-10-CM

## 2024-12-01 PROCEDURE — 71045 X-RAY EXAM CHEST 1 VIEW: CPT

## 2024-12-01 PROCEDURE — 99284 EMERGENCY DEPT VISIT MOD MDM: CPT

## 2024-12-01 PROCEDURE — 70450 CT HEAD/BRAIN W/O DYE: CPT

## 2024-12-01 NOTE — ED PROVIDER NOTES
Time: 5:03 PM EST  Date of encounter:  12/1/2024  Independent Historian/Clinical History and Information was obtained by:   Patient and Family    History is limited by: N/A    Chief Complaint: Head injury      History of Present Illness:  Patient is a 79 y.o. year old male who presents to the emergency department for evaluation of head injury.  Patient fell back hitting the back of his head on a hardwood floor.  There was no loss consciousness.  Patient currently takes Eliquis.  Patient denies any headache or other injury.      Patient Care Team  Primary Care Provider: Domingo Bravo MD    Past Medical History:     No Known Allergies  Past Medical History:   Diagnosis Date    Abnormal stress test     Anemia     IRON INFUSIONS WITH DIALYSIS    Anesthesia     WITH HIP REPLACEMENT DAUGHTER BELIEVES HAD SVT 2000    Ankle pain, right     Anxiety and depression     CKD (chronic kidney disease), stage IV     DIALYSIS [id number] SHILEY IN RIGHT CHEST    Diabetes     Gout     High cholesterol     History of peritoneal dialysis     HL (hearing loss)     Hyperkalemia     Hypertension     Hypothyroidism     Insomnia     Night terrors     Psoriasis     Psoriasis     Sleep walking     Thrombocytopenia     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    Vitiligo      Past Surgical History:   Procedure Laterality Date    ANKLE SURGERY Right 11/1990    APPENDECTOMY N/A 1954    ARTERIOVENOUS FISTULA/SHUNT SURGERY Left 07/16/2024    Procedure: Creation of left arm arteriovenous fistula;  Surgeon: Moses Mir MD;  Location: [facility] MAIN OR;  Service: Vascular;  Laterality: Left;    BRONCHOSCOPY N/A 03/01/2024    Procedure: BRONCHOSCOPY WITH BAL AND WASHINGS;  Surgeon: Sandra Espinal MD;  Location: [facility] ENDOSCOPY;  Service: Pulmonary;  Laterality: N/A;  PNEUMONIA    BRONCHOSCOPY N/A 08/30/2024    Procedure: BRONCHOSCOPY WITH BALS AND WASHINGS;  Surgeon: Sandra Espinal MD;  Location: [facility] ENDOSCOPY;  Service: Pulmonary;   Laterality: N/A;  MUCOUS PLUGGING    CARDIAC CATHETERIZATION N/A 05/29/2024    Procedure: Left Heart Cath with possible coronary angioplasty;  Surgeon: Stephan Nichols MD;  Location: [facility] CATH INVASIVE LOCATION;  Service: Cardiology;  Laterality: N/A;    COLONOSCOPY N/A 06/2022    [facility]    ENDOSCOPY N/A 10/28/2024    Procedure: ESOPHAGOGASTRODUODENOSCOPY INSERTION OF LIGHTED INSTRUMENT TO VIEW ESOPHAGUS, STOMACH AND SMALL INTESTINE;  Surgeon: Kingsley Peraza MD;  Location: [facility] ENDOSCOPY;  Service: Gastroenterology;  Laterality: N/A;  Gastritis    HIP BIPOLAR REPLACEMENT Right 01/2000    INSERTION HEMODIALYSIS CATHETER Left 04/09/2024    Procedure: HEMODIALYSIS CATHETER INSERTION;  Surgeon: Jose Berry MD;  Location: [facility] OR;  Service: General;  Laterality: Left;    INSERTION HEMODIALYSIS CATHETER N/A 04/12/2024    Procedure: Tunneled hemodialysis catheter insertion;  Surgeon: Enrique Vinson MD;  Location: [facility] OR;  Service: Vascular;  Laterality: N/A;    INSERTION PERITONEAL DIALYSIS CATHETER N/A 03/27/2023    Procedure: LAPAROSCOPIC INSERTION PERITONEAL DIALYSIS CATHETER, LAPAROSCOPIC OMENTOPEXY WITH LYSIS OF ADHESIONS;  Surgeon: Jose Berry MD;  Location: [facility] OR;  Service: General;  Laterality: N/A;    INSERTION PERITONEAL DIALYSIS CATHETER Left 07/23/2023    Procedure: REVISION OF PERITONEAL DIALYSIS CATHETER;  Surgeon: Radha Oreilly MD;  Location: [facility] MAIN OR;  Service: General;  Laterality: Left;    REMOVAL PERITONEAL DIALYSIS CATHETER N/A 04/09/2024    Procedure: REMOVAL PERITONEAL DIALYSIS CATHETER;  Surgeon: Jose Berry MD;  Location: [facility] MAIN OR;  Service: General;  Laterality: N/A;    RENAL BIOPSY Left 07/15/2022    UPPER GASTROINTESTINAL ENDOSCOPY      WRIST SURGERY      UNSURE WHICH SIDE DAUGHTER REPORTS HAD SEVERE  CUT FROM WINDOW AND THEY HAD TO DO RECONSTRUCTIVE SURGERY WITH VESSELS AND NERVES      Family History   Problem Relation Age of Onset    Diabetes Mother     Heart disease Father     Colon cancer Sister 77    Cancer Sister 35    Diabetes Sister     Diabetes Brother     Sleep apnea Paternal Aunt     Heart disease Paternal Uncle     Sleep apnea Daughter     Malig Hyperthermia Neg Hx        Home Medications:  Prior to Admission medications    Medication Sig Start Date End Date Taking? Authorizing Provider   acetaminophen (TYLENOL) 500 MG tablet Take 2 tablets by mouth Every 8 (Eight) Hours.  Patient not taking: Reported on 11/27/2024 11/18/24   Donovan Griffiths MD   allopurinol (ZYLOPRIM) 300 MG tablet Take 1 tablet by mouth Daily As Needed.  Patient not taking: Reported on 11/22/2024 3/11/24   Provider, MD Serena   apixaban (ELIQUIS) 5 MG tablet tablet Take 1 tablet by mouth Every 12 (Twelve) Hours for 30 days. Indications: Atrial Fibrillation 11/18/24 12/18/24  Donovan Griffiths MD   atorvastatin (LIPITOR) 40 MG tablet Take 1 tablet by mouth Daily. 7/17/24   Domingo Bravo MD   Budeson-Glycopyrrol-Formoterol (Breztri Aerosphere) 160-9-4.8 MCG/ACT aerosol inhaler Inhale 2 puffs 2 (Two) Times a Day. 10/14/24   Sandra Espinal MD   donepezil (Aricept) 5 MG tablet Take 1 tablet by mouth Every Night. 11/22/24   Domingo Bravo MD   famotidine (Pepcid) 20 MG tablet Take 1 tablet by mouth Daily. 11/22/24   Domingo Bravo MD   Insulin Lispro, 1 Unit Dial, (HumaLOG KwikPen) 100 UNIT/ML solution pen-injector Inject per sliding scale up to 7 Units under the skin Four Times a Day As Needed. Blood Glucose 150-199 mg/dL - 2 units, 200-249 - 3 units, 250-299 - 4 units, 300-349 - 5 units, 350-400 - 6 units, >400 - 7 units & Call Provider  Patient not taking: Reported on 11/27/2024 11/18/24   Donovan Griffiths MD   Insulin Pen Needle (BD Pen Needle Ayse 2nd Gen) 32G X 4 MM misc 1 each 4 (Four) Times a Day for use with insulin injections 11/18/24   Donovan Griffiths MD   Levothyroxine Sodium  175 MCG capsule Take 175 mcg by mouth Daily. 9/10/24   Domingo Bravo MD   Methoxy PEG-Epoetin Beta (MIRCERA IJ) 1 (One) Time As Needed (Every 14 days PRN- would receive during dialysis). 24  Serena Matute MD   naloxone (NARCAN) 4 MG/0.1ML nasal spray Call 911. Don't prime. [place] in 1 nostril for overdose. Repeat in 2-3 minutes in other nostril if no or minimal breathing/responsiveness. 24   Donovan Griffiths MD   QUEtiapine (SEROquel) 25 MG tablet 25 mg (1 tab) bid prn agitation. 50 mg (2 tab) qhs for insomnia. 24   Areli Conner MD   Risankizumab-rzaa (Skyrizi Pen) 150 MG/ML solution auto-injector Inject 1 dose under the skin into the appropriate area as directed Take As Directed. Every 3 months    Serena Matute MD   sertraline (ZOLOFT) 100 MG tablet Take 1 tablet by mouth Every Night. 24   Domingo Bravo MD   sevelamer (RENVELA) 800 MG tablet Take 1 tablet by mouth 3 (Three) Times a Day With Meals. 24   Donovan Griffiths MD   torsemide (DEMADEX) 100 MG tablet Take 1 tablet by mouth Daily. 24   Domingo Bravo MD   Vancomycin HCl (VANCOMYCIN PHARMACY INTERMITTENT/PULSE DOSING) Use Daily for 40 doses. Indications: Bone and/or Joint Infection 24  Donovan Griffiths MD        Social History:   Social History     Tobacco Use    Smoking status: Former     Current packs/day: 0.00     Average packs/day: 1 pack/day for 10.0 years (10.0 ttl pk-yrs)     Types: Cigarettes     Start date:      Quit date: 2000     Years since quittin.9     Passive exposure: Past    Smokeless tobacco: Never    Tobacco comments:     quit 25 years ago, chews nicotine gum in past   Vaping Use    Vaping status: Never Used   Substance Use Topics    Alcohol use: Not Currently     Comment: 1 DRINK/DAY-rarely    Drug use: Never         Review of Systems:  Review of Systems   Respiratory:  Positive for shortness of breath.         Physical Exam:  BP 91/75   " Pulse 56   Temp 97.5 °F (36.4 °C) (Oral)   Resp 18   Ht 170.2 cm (67\")   Wt 71 kg (156 lb 8.4 oz)   SpO2 98%   BMI 24.52 kg/m²     Physical Exam  Vitals and nursing note reviewed.   Constitutional:       Appearance: Normal appearance.   HENT:      Head: Normocephalic.      Comments: Occipital hematoma without abrasion or laceration present  Eyes:      Extraocular Movements: Extraocular movements intact.   Cardiovascular:      Rate and Rhythm: Normal rate.   Pulmonary:      Effort: Pulmonary effort is normal.   Musculoskeletal:         General: Normal range of motion.      Cervical back: Normal range of motion.   Skin:     General: Skin is warm and dry.   Neurological:      General: No focal deficit present.      Mental Status: He is alert and oriented to person, place, and time.   Psychiatric:         Mood and Affect: Mood normal.                  Procedures:  Procedures      Medical Decision Making:      Comorbidities that affect care:    Chronic Kidney Disease, Diabetes, Hypertension    External Notes reviewed:    Previous Clinic Note: Patient seen 11/27/2024 by Dr. Conner, psychiatry      The following orders were placed and all results were independently analyzed by me:  Orders Placed This Encounter   Procedures    CT Head Without Contrast    XR Chest 1 View       Medications Given in the Emergency Department:  Medications - No data to display     ED Course:         Labs:    Lab Results (last 24 hours)       ** No results found for the last 24 hours. **             Imaging:    CT Head Without Contrast    Result Date: 12/1/2024  CT HEAD WO CONTRAST Date of Exam: 12/1/2024 6:17 PM EST Indication: Head injury headache. Comparison: MRI 11/15/2024 Technique: Axial CT images were obtained of the head without contrast administration.  Reconstructed coronal and sagittal images were also obtained. Automated exposure control and iterative construction methods were used. Findings: There is mild generalized " parenchymal volume loss. There is no acute infarct or hemorrhage. No abnormal extra-axial fluid collection is identified. There is no mass effect or hydrocephalus. There is a small scalp hematoma overlying the right parietal bone at the vertex. This measures 1.4 x 0.8 cm. No underlying skull fracture. There is partial opacification of the right mastoid air cells. Left mastoid air cells are clear. Visualized paranasal sinuses are clear. Globes and orbits are within normal limits     Impression: 1. Small hematoma within the scalp overlying the right parietal bone at the vertex. No underlying skull fracture. 2. No acute intracranial abnormality. 3. Partial opacification of the right mastoid air cells unchanged from 11/15/2024 Electronically Signed: Stephan Dey MD  12/1/2024 6:27 PM EST  Workstation ID: [id number]    XR Chest 1 View    Result Date: 12/1/2024  XR CHEST 1 VW Date of Exam: 12/1/2024 5:20 PM EST Indication: Cough, persistent Short of breath cough Comparison: 10/31/2024 Findings: In the interval the right internal jugular dialysis catheter has been removed. Heart size is at the upper limits of normal. Lung volumes are low. There is patchy bibasilar and right perihilar airspace disease which may be secondary to pulmonary edema or pneumonia. There is a small right pleural effusion which appears new. No left pleural effusion. No pneumothorax.     Impression: 1. Low volume inspiration with bibasilar and right perihilar airspace disease which may be secondary to pulmonary edema or less likely pneumonia. 2. New small right pleural effusion. Electronically Signed: Stephan Dey MD  12/1/2024 5:33 PM EST  Workstation ID: [id number]       Differential Diagnosis and Discussion:    Trauma:  Differential diagnosis considered but not limited to were subarachnoid hemorrhage, intracranial bleeding, pneumothorax, cardiac contusion, lung contusion, intra-abdominal bleeding, and compartment syndrome of any extremity or other  significant traumatic pathology    All X-rays impressions were independently interpreted by me.  CT scan radiology impression was interpreted by me.    MDM     Patient's head CT did not show any acute injury.  His chest x-ray suggest mild pulmonary edema.  Patient was discussed with his daughter who is a physician and she will manage pulmonary edema at home.          Patient Care Considerations:    ANTIBIOTICS: I considered prescribing antibiotics as an outpatient however no bacterial focus of infection was found.      Consultants/Shared Management Plan:    None    Social Determinants of Health:    Patient has presented with family members who are responsible, reliable and will ensure follow up care.      Disposition and Care Coordination:    Discharged: The patient is suitable and stable for discharge with no need for consideration of admission.    I have explained the patient´s condition, diagnoses and treatment plan based on the information available to me at this time. I have answered questions and addressed any concerns. The patient has a good  understanding of the patient´s diagnosis, condition, and treatment plan as can be expected at this point. The vital signs have been stable. The patient´s condition is stable and appropriate for discharge from the emergency department.      The patient will pursue further outpatient evaluation with the primary care physician or other designated or consulting physician as outlined in the discharge instructions. They are agreeable to this plan of care and follow-up instructions have been explained in detail. The patient has received these instructions in written format and has expressed an understanding of the discharge instructions. The patient is aware that any significant change in condition or worsening of symptoms should prompt an immediate return to this or the closest emergency department or call to 911.    Final diagnoses:   Injury of head, initial encounter    Hematoma of scalp, initial encounter   Acute pulmonary edema        ED Disposition       ED Disposition   Discharge    Condition   Stable    Comment   --               This medical record created using voice recognition software.             Abraham Jara DO  12/01/24 1919

## 2024-12-03 ENCOUNTER — TELEPHONE (OUTPATIENT)
Dept: PSYCHIATRY | Facility: CLINIC | Age: 79
End: 2024-12-03
Payer: MEDICARE

## 2024-12-03 DIAGNOSIS — F05 DELIRIUM DUE TO ANOTHER MEDICAL CONDITION: ICD-10-CM

## 2024-12-03 DIAGNOSIS — R45.1 AGITATION: ICD-10-CM

## 2024-12-03 DIAGNOSIS — R44.3 HALLUCINATIONS: ICD-10-CM

## 2024-12-03 RX ORDER — QUETIAPINE FUMARATE 50 MG/1
TABLET, FILM COATED ORAL
Qty: 60 TABLET | Refills: 2 | Status: ON HOLD | OUTPATIENT
Start: 2024-12-03

## 2024-12-03 NOTE — TELEPHONE ENCOUNTER
"FAX COMMUNICATION RECEIVED FROM THE PHARMACY.    \"INSURANCE WILL ONLY COVER 2 PER DAY, NOT 4; PLEASE SEND ANOTHER WAY.\"    QUEtiapine (SEROquel) 25 MG tablet (11/27/2024)     PROVIDER PLEASE REVIEW AND ADVISE.  "

## 2024-12-05 ENCOUNTER — HOSPITAL ENCOUNTER (INPATIENT)
Facility: HOSPITAL | Age: 79
LOS: 34 days | Discharge: HOME HEALTH | End: 2025-01-08
Attending: EMERGENCY MEDICINE | Admitting: FAMILY MEDICINE
Payer: MEDICARE

## 2024-12-05 ENCOUNTER — APPOINTMENT (OUTPATIENT)
Dept: CT IMAGING | Facility: HOSPITAL | Age: 79
End: 2024-12-05
Payer: MEDICARE

## 2024-12-05 ENCOUNTER — APPOINTMENT (OUTPATIENT)
Dept: GENERAL RADIOLOGY | Facility: HOSPITAL | Age: 79
End: 2024-12-05
Payer: MEDICARE

## 2024-12-05 DIAGNOSIS — J96.01 ACUTE RESPIRATORY FAILURE WITH HYPOXIA: Primary | ICD-10-CM

## 2024-12-05 DIAGNOSIS — J81.0 ACUTE PULMONARY EDEMA: ICD-10-CM

## 2024-12-05 DIAGNOSIS — R26.2 DIFFICULTY WALKING: ICD-10-CM

## 2024-12-05 DIAGNOSIS — R13.12 OROPHARYNGEAL DYSPHAGIA: ICD-10-CM

## 2024-12-05 PROBLEM — R09.02 HYPOXIA: Status: ACTIVE | Noted: 2024-12-05

## 2024-12-05 LAB
ALBUMIN SERPL-MCNC: 3.3 G/DL (ref 3.5–5.2)
ALBUMIN/GLOB SERPL: 0.8 G/DL
ALP SERPL-CCNC: 72 U/L (ref 39–117)
ALT SERPL W P-5'-P-CCNC: 10 U/L (ref 1–41)
ANION GAP SERPL CALCULATED.3IONS-SCNC: 9.4 MMOL/L (ref 5–15)
AST SERPL-CCNC: 27 U/L (ref 1–40)
BASOPHILS # BLD AUTO: 0.02 10*3/MM3 (ref 0–0.2)
BASOPHILS NFR BLD AUTO: 0.5 % (ref 0–1.5)
BILIRUB SERPL-MCNC: 0.6 MG/DL (ref 0–1.2)
BUN SERPL-MCNC: 12 MG/DL (ref 8–23)
BUN/CREAT SERPL: 3.3 (ref 7–25)
CALCIUM SPEC-SCNC: 9.4 MG/DL (ref 8.6–10.5)
CHLORIDE SERPL-SCNC: 97 MMOL/L (ref 98–107)
CO2 SERPL-SCNC: 30.6 MMOL/L (ref 22–29)
CREAT SERPL-MCNC: 3.59 MG/DL (ref 0.76–1.27)
D-LACTATE SERPL-SCNC: 1.2 MMOL/L (ref 0.5–2)
DEPRECATED RDW RBC AUTO: 61.8 FL (ref 37–54)
EGFRCR SERPLBLD CKD-EPI 2021: 16.6 ML/MIN/1.73
EOSINOPHIL # BLD AUTO: 0 10*3/MM3 (ref 0–0.4)
EOSINOPHIL NFR BLD AUTO: 0 % (ref 0.3–6.2)
ERYTHROCYTE [DISTWIDTH] IN BLOOD BY AUTOMATED COUNT: 17.9 % (ref 12.3–15.4)
GEN 5 1HR TROPONIN T REFLEX: 113 NG/L
GLOBULIN UR ELPH-MCNC: 4.1 GM/DL
GLUCOSE BLDC GLUCOMTR-MCNC: 107 MG/DL (ref 70–99)
GLUCOSE SERPL-MCNC: 107 MG/DL (ref 65–99)
HCT VFR BLD AUTO: 29 % (ref 37.5–51)
HGB BLD-MCNC: 8.4 G/DL (ref 13–17.7)
HOLD SPECIMEN: NORMAL
IMM GRANULOCYTES # BLD AUTO: 0.01 10*3/MM3 (ref 0–0.05)
IMM GRANULOCYTES NFR BLD AUTO: 0.2 % (ref 0–0.5)
LYMPHOCYTES # BLD AUTO: 1.41 10*3/MM3 (ref 0.7–3.1)
LYMPHOCYTES NFR BLD AUTO: 34.1 % (ref 19.6–45.3)
MCH RBC QN AUTO: 27.2 PG (ref 26.6–33)
MCHC RBC AUTO-ENTMCNC: 29 G/DL (ref 31.5–35.7)
MCV RBC AUTO: 93.9 FL (ref 79–97)
MONOCYTES # BLD AUTO: 0.44 10*3/MM3 (ref 0.1–0.9)
MONOCYTES NFR BLD AUTO: 10.7 % (ref 5–12)
NEUTROPHILS NFR BLD AUTO: 2.25 10*3/MM3 (ref 1.7–7)
NEUTROPHILS NFR BLD AUTO: 54.5 % (ref 42.7–76)
NRBC BLD AUTO-RTO: 0 /100 WBC (ref 0–0.2)
NT-PROBNP SERPL-MCNC: ABNORMAL PG/ML (ref 0–1800)
PLATELET # BLD AUTO: 132 10*3/MM3 (ref 140–450)
PMV BLD AUTO: 9.4 FL (ref 6–12)
POTASSIUM SERPL-SCNC: 3.9 MMOL/L (ref 3.5–5.2)
PROT SERPL-MCNC: 7.4 G/DL (ref 6–8.5)
QT INTERVAL: 363 MS
QTC INTERVAL: 463 MS
RBC # BLD AUTO: 3.09 10*6/MM3 (ref 4.14–5.8)
SODIUM SERPL-SCNC: 137 MMOL/L (ref 136–145)
TROPONIN T NUMERIC DELTA: 10 NG/L
TROPONIN T SERPL HS-MCNC: 103 NG/L
WBC NRBC COR # BLD AUTO: 4.13 10*3/MM3 (ref 3.4–10.8)
WHOLE BLOOD HOLD COAG: NORMAL
WHOLE BLOOD HOLD SPECIMEN: NORMAL

## 2024-12-05 PROCEDURE — 71045 X-RAY EXAM CHEST 1 VIEW: CPT

## 2024-12-05 PROCEDURE — 99223 1ST HOSP IP/OBS HIGH 75: CPT | Performed by: INTERNAL MEDICINE

## 2024-12-05 PROCEDURE — 87154 CUL TYP ID BLD PTHGN 6+ TRGT: CPT | Performed by: EMERGENCY MEDICINE

## 2024-12-05 PROCEDURE — 85025 COMPLETE CBC W/AUTO DIFF WBC: CPT | Performed by: EMERGENCY MEDICINE

## 2024-12-05 PROCEDURE — 82948 REAGENT STRIP/BLOOD GLUCOSE: CPT | Performed by: INTERNAL MEDICINE

## 2024-12-05 PROCEDURE — 36415 COLL VENOUS BLD VENIPUNCTURE: CPT

## 2024-12-05 PROCEDURE — 80053 COMPREHEN METABOLIC PANEL: CPT | Performed by: EMERGENCY MEDICINE

## 2024-12-05 PROCEDURE — 25010000002 BUMETANIDE PER 0.5 MG: Performed by: INTERNAL MEDICINE

## 2024-12-05 PROCEDURE — 84146 ASSAY OF PROLACTIN: CPT | Performed by: INTERNAL MEDICINE

## 2024-12-05 PROCEDURE — 99291 CRITICAL CARE FIRST HOUR: CPT

## 2024-12-05 PROCEDURE — 84484 ASSAY OF TROPONIN QUANT: CPT | Performed by: EMERGENCY MEDICINE

## 2024-12-05 PROCEDURE — 83605 ASSAY OF LACTIC ACID: CPT | Performed by: EMERGENCY MEDICINE

## 2024-12-05 PROCEDURE — 93005 ELECTROCARDIOGRAM TRACING: CPT | Performed by: EMERGENCY MEDICINE

## 2024-12-05 PROCEDURE — 88312 SPECIAL STAINS GROUP 1: CPT | Performed by: EMERGENCY MEDICINE

## 2024-12-05 PROCEDURE — 87186 SC STD MICRODIL/AGAR DIL: CPT | Performed by: EMERGENCY MEDICINE

## 2024-12-05 PROCEDURE — 87147 CULTURE TYPE IMMUNOLOGIC: CPT | Performed by: EMERGENCY MEDICINE

## 2024-12-05 PROCEDURE — 87076 CULTURE ANAEROBE IDENT EACH: CPT | Performed by: EMERGENCY MEDICINE

## 2024-12-05 PROCEDURE — 87340 HEPATITIS B SURFACE AG IA: CPT | Performed by: INTERNAL MEDICINE

## 2024-12-05 PROCEDURE — 87077 CULTURE AEROBIC IDENTIFY: CPT | Performed by: EMERGENCY MEDICINE

## 2024-12-05 PROCEDURE — 83880 ASSAY OF NATRIURETIC PEPTIDE: CPT | Performed by: EMERGENCY MEDICINE

## 2024-12-05 PROCEDURE — 87040 BLOOD CULTURE FOR BACTERIA: CPT | Performed by: EMERGENCY MEDICINE

## 2024-12-05 RX ORDER — ARFORMOTEROL TARTRATE 15 UG/2ML
15 SOLUTION RESPIRATORY (INHALATION)
Status: DISCONTINUED | OUTPATIENT
Start: 2024-12-05 | End: 2025-01-08 | Stop reason: HOSPADM

## 2024-12-05 RX ORDER — BUMETANIDE 0.25 MG/ML
3 INJECTION, SOLUTION INTRAMUSCULAR; INTRAVENOUS ONCE
Status: COMPLETED | OUTPATIENT
Start: 2024-12-05 | End: 2024-12-05

## 2024-12-05 RX ORDER — SODIUM CHLORIDE 0.9 % (FLUSH) 0.9 %
10 SYRINGE (ML) INJECTION AS NEEDED
Status: DISCONTINUED | OUTPATIENT
Start: 2024-12-05 | End: 2025-01-08 | Stop reason: HOSPADM

## 2024-12-05 RX ORDER — MIDODRINE HYDROCHLORIDE 5 MG/1
5 TABLET ORAL
Status: DISCONTINUED | OUTPATIENT
Start: 2024-12-06 | End: 2024-12-07

## 2024-12-05 RX ORDER — NICOTINE POLACRILEX 4 MG
15 LOZENGE BUCCAL
Status: DISCONTINUED | OUTPATIENT
Start: 2024-12-05 | End: 2025-01-08 | Stop reason: HOSPADM

## 2024-12-05 RX ORDER — SODIUM CHLORIDE 9 MG/ML
40 INJECTION, SOLUTION INTRAVENOUS AS NEEDED
Status: DISCONTINUED | OUTPATIENT
Start: 2024-12-05 | End: 2025-01-08 | Stop reason: HOSPADM

## 2024-12-05 RX ORDER — BUDESONIDE 0.5 MG/2ML
0.5 INHALANT ORAL
Status: DISCONTINUED | OUTPATIENT
Start: 2024-12-05 | End: 2025-01-08 | Stop reason: HOSPADM

## 2024-12-05 RX ORDER — INSULIN LISPRO 100 [IU]/ML
2-7 INJECTION, SOLUTION INTRAVENOUS; SUBCUTANEOUS
Status: DISCONTINUED | OUTPATIENT
Start: 2024-12-05 | End: 2024-12-10

## 2024-12-05 RX ORDER — SEVELAMER CARBONATE FOR ORAL SUSPENSION 800 MG/1
800 POWDER, FOR SUSPENSION ORAL
Status: ON HOLD | COMMUNITY

## 2024-12-05 RX ORDER — MIDODRINE HYDROCHLORIDE 5 MG/1
5 TABLET ORAL
Status: ON HOLD | COMMUNITY

## 2024-12-05 RX ORDER — SODIUM CHLORIDE 0.9 % (FLUSH) 0.9 %
10 SYRINGE (ML) INJECTION EVERY 12 HOURS SCHEDULED
Status: DISCONTINUED | OUTPATIENT
Start: 2024-12-05 | End: 2025-01-08 | Stop reason: HOSPADM

## 2024-12-05 RX ORDER — DEXTROSE MONOHYDRATE 25 G/50ML
25 INJECTION, SOLUTION INTRAVENOUS
Status: DISCONTINUED | OUTPATIENT
Start: 2024-12-05 | End: 2025-01-08 | Stop reason: HOSPADM

## 2024-12-05 RX ORDER — IPRATROPIUM BROMIDE AND ALBUTEROL SULFATE 2.5; .5 MG/3ML; MG/3ML
3 SOLUTION RESPIRATORY (INHALATION) EVERY 6 HOURS PRN
Status: DISCONTINUED | OUTPATIENT
Start: 2024-12-05 | End: 2025-01-03

## 2024-12-05 RX ORDER — QUETIAPINE FUMARATE 25 MG/1
25 TABLET, FILM COATED ORAL NIGHTLY
Status: DISCONTINUED | OUTPATIENT
Start: 2024-12-05 | End: 2024-12-07

## 2024-12-05 RX ORDER — IBUPROFEN 600 MG/1
1 TABLET ORAL
Status: DISCONTINUED | OUTPATIENT
Start: 2024-12-05 | End: 2025-01-08 | Stop reason: HOSPADM

## 2024-12-05 RX ADMIN — Medication 10 ML: at 23:30

## 2024-12-05 RX ADMIN — BUMETANIDE 3 MG: 0.25 INJECTION INTRAMUSCULAR; INTRAVENOUS at 23:31

## 2024-12-05 RX ADMIN — QUETIAPINE FUMARATE 25 MG: 25 TABLET ORAL at 23:30

## 2024-12-05 NOTE — ED PROVIDER NOTES
Time: 4:32 PM EST  Date of encounter:  12/5/2024  Independent Historian/Clinical History and Information was obtained by:   Family and Nursing Staff    History is limited by: Acuity of Condition    Chief Complaint: Shortness of breath      History of Present Illness:  Patient is a 79 y.o. year old male who presents to the emergency department for evaluation of shortness of breath.  Patient has a known history of end-stage renal disease, hypertension, diabetes who presents with hypoxia and shortness of breath.  Did receive dialysis today.  He had progressive shortness of breath and had hypoxia at home and was brought here for further eval.      Patient Care Team  Primary Care Provider: Domingo Bravo MD    Past Medical History:     No Known Allergies  Past Medical History:   Diagnosis Date    Abnormal stress test     Anemia     IRON INFUSIONS WITH DIALYSIS    Anesthesia     WITH HIP REPLACEMENT DAUGHTER BELIEVES HAD SVT 2000    Ankle pain, right     Anxiety and depression     CKD (chronic kidney disease), stage IV     DIALYSIS [id number] IN RIGHT CHEST    Diabetes     Gout     High cholesterol     History of peritoneal dialysis     HL (hearing loss)     Hyperkalemia     Hypertension     Hypothyroidism     Insomnia     Night terrors     Psoriasis     Psoriasis     Sleep walking     Thrombocytopenia     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    Vitiligo      Past Surgical History:   Procedure Laterality Date    ANKLE SURGERY Right 11/1990    APPENDECTOMY N/A 1954    ARTERIOVENOUS FISTULA/SHUNT SURGERY Left 07/16/2024    Procedure: Creation of left arm arteriovenous fistula;  Surgeon: Moses Mir MD;  [facility] MAIN OR;  Service: Vascular;  Laterality: Left;    BRONCHOSCOPY N/A 03/01/2024    Procedure: BRONCHOSCOPY WITH BAL AND WASHINGS;  Surgeon: Sandra Espinal MD;  Location: [facility] ENDOSCOPY;  Service: Pulmonary;  Laterality: N/A;  PNEUMONIA    BRONCHOSCOPY N/A 08/30/2024    Procedure:  BRONCHOSCOPY WITH BALS AND WASHINGS;  Surgeon: Sandra Espinal MD;  Location: [facility] ENDOSCOPY;  Service: Pulmonary;  Laterality: N/A;  MUCOUS PLUGGING    CARDIAC CATHETERIZATION N/A 05/29/2024    Procedure: Left Heart Cath with possible coronary angioplasty;  Surgeon: Stephan Nichols MD;  Location: [facility] CATH INVASIVE LOCATION;  Service: Cardiology;  Laterality: N/A;    COLONOSCOPY N/A 06/2022    [facility]    ENDOSCOPY N/A 10/28/2024    Procedure: ESOPHAGOGASTRODUODENOSCOPY INSERTION OF LIGHTED INSTRUMENT TO VIEW ESOPHAGUS, STOMACH AND SMALL INTESTINE;  Surgeon: Kingsley Peraza MD;  Location: [facility] ENDOSCOPY;  Service: Gastroenterology;  Laterality: N/A;  Gastritis    HIP BIPOLAR REPLACEMENT Right 01/2000    INSERTION HEMODIALYSIS CATHETER Left 04/09/2024    Procedure: HEMODIALYSIS CATHETER INSERTION;  Surgeon: Jose Berry MD;  Location: [facility] MAIN OR;  Service: General;  Laterality: Left;    INSERTION HEMODIALYSIS CATHETER N/A 04/12/2024    Procedure: Tunneled hemodialysis catheter insertion;  Surgeon: Enrique Vinson MD;  Location: [facility] MAIN OR;  Service: Vascular;  Laterality: N/A;    INSERTION PERITONEAL DIALYSIS CATHETER N/A 03/27/2023    Procedure: LAPAROSCOPIC INSERTION PERITONEAL DIALYSIS CATHETER, LAPAROSCOPIC OMENTOPEXY WITH LYSIS OF ADHESIONS;  Surgeon: Jose Berry MD;  Location: [facility] MAIN OR;  Service: General;  Laterality: N/A;    INSERTION PERITONEAL DIALYSIS CATHETER Left 07/23/2023    Procedure: REVISION OF PERITONEAL DIALYSIS CATHETER;  Surgeon: Radha Oreilly MD;  Location: [facility] MAIN OR;  Service: General;  Laterality: Left;    REMOVAL PERITONEAL DIALYSIS CATHETER N/A 04/09/2024    Procedure: REMOVAL PERITONEAL DIALYSIS CATHETER;  Surgeon: Jsoe Berry MD;  Location: [facility] MAIN OR;  Service: General;  Laterality: N/A;    RENAL BIOPSY Left 07/15/2022    UPPER GASTROINTESTINAL ENDOSCOPY      WRIST SURGERY      UNSURE WHICH SIDE  DAUGHTER REPORTS HAD SEVERE  CUT FROM WINDOW AND THEY HAD TO DO RECONSTRUCTIVE SURGERY WITH VESSELS AND NERVES     Family History   Problem Relation Age of Onset    Diabetes Mother     Heart disease Father     Colon cancer Sister 77    Cancer Sister 35    Diabetes Sister     Diabetes Brother     Sleep apnea Paternal Aunt     Heart disease Paternal Uncle     Sleep apnea Daughter     Malig Hyperthermia Neg Hx        Home Medications:  Prior to Admission medications    Medication Sig Start Date End Date Taking? Authorizing Provider   acetaminophen (TYLENOL) 500 MG tablet Take 2 tablets by mouth Every 8 (Eight) Hours.  Patient not taking: Reported on 11/27/2024 11/18/24   Donovan Griffiths MD   allopurinol (ZYLOPRIM) 300 MG tablet Take 1 tablet by mouth Daily As Needed.  Patient not taking: Reported on 11/22/2024 3/11/24   Provider, MD eSrena   apixaban (ELIQUIS) 5 MG tablet tablet Take 1 tablet by mouth Every 12 (Twelve) Hours for 30 days. Indications: Atrial Fibrillation 11/18/24 12/18/24  Donovan Griffiths MD   atorvastatin (LIPITOR) 40 MG tablet Take 1 tablet by mouth Daily. 7/17/24   Domingo Bravo MD   Budeson-Glycopyrrol-Formoterol (Breztri Aerosphere) 160-9-4.8 MCG/ACT aerosol inhaler Inhale 2 puffs 2 (Two) Times a Day. 10/14/24   Sandra Espinal MD   donepezil (Aricept) 5 MG tablet Take 1 tablet by mouth Every Night. 11/22/24   Domingo Bravo MD   famotidine (Pepcid) 20 MG tablet Take 1 tablet by mouth Daily. 11/22/24   Domingo Bravo MD   Insulin Lispro, 1 Unit Dial, (HumaLOG KwikPen) 100 UNIT/ML solution pen-injector Inject per sliding scale up to 7 Units under the skin Four Times a Day As Needed. Blood Glucose 150-199 mg/dL - 2 units, 200-249 - 3 units, 250-299 - 4 units, 300-349 - 5 units, 350-400 - 6 units, >400 - 7 units & Call Provider  Patient not taking: Reported on 11/27/2024 11/18/24   Donovan Griffiths MD   Insulin Pen Needle (BD Pen Needle Ayse 2nd Gen) 32G X 4 MM misc 1 each 4  (Four) Times a Day for use with insulin injections 24   Donovan Griffiths MD   Levothyroxine Sodium 175 MCG capsule Take 175 mcg by mouth Daily. 9/10/24   Domingo Bravo MD   Methoxy PEG-Epoetin Beta (MIRCERA IJ) 1 (One) Time As Needed (Every 14 days PRN- would receive during dialysis). 24  ProviderSerena MD   naloxone (NARCAN) 4 MG/0.1ML nasal spray Call 911. Don't prime. [place] in 1 nostril for overdose. Repeat in 2-3 minutes in other nostril if no or minimal breathing/responsiveness. 24   Donovan Griffiths MD   QUEtiapine (SEROquel) 50 MG tablet 25 mg (half tab) bid prn agitation. 50 mg (1 tab) qhs for insomnia. 12/3/24   Areli Conner MD   Risankizumab-rzaa (Skyrizi Pen) 150 MG/ML solution auto-injector Inject 1 dose under the skin into the appropriate area as directed Take As Directed. Every 3 months    ProviderSerena MD   sertraline (ZOLOFT) 100 MG tablet Take 1 tablet by mouth Every Night. 24   Domingo Bravo MD   sevelamer (RENVELA) 800 MG tablet Take 1 tablet by mouth 3 (Three) Times a Day With Meals. 24   Doonvan Griffiths MD   torsemide (DEMADEX) 100 MG tablet Take 1 tablet by mouth Daily. 24   Domingo Bravo MD   Vancomycin HCl (VANCOMYCIN PHARMACY INTERMITTENT/PULSE DOSING) Use Daily for 40 doses. Indications: Bone and/or Joint Infection 24  Donovan Griffiths MD        Social History:   Social History     Tobacco Use    Smoking status: Former     Current packs/day: 0.00     Average packs/day: 1 pack/day for 10.0 years (10.0 ttl pk-yrs)     Types: Cigarettes     Start date:      Quit date: 2000     Years since quittin.9     Passive exposure: Past    Smokeless tobacco: Never    Tobacco comments:     quit 25 years ago, chews nicotine gum in past   Vaping Use    Vaping status: Never Used   Substance Use Topics    Alcohol use: Not Currently     Comment: 1 DRINK/DAY-rarely    Drug use: Never         Review of  Systems:  Review of Systems   Respiratory:  Positive for shortness of breath.         Physical Exam:  /85   Pulse 96   Resp 20   SpO2 100%     Physical Exam  Vitals and nursing note reviewed.   Constitutional:       Appearance: Normal appearance. He is ill-appearing.   HENT:      Head: Normocephalic and atraumatic.   Eyes:      General: No scleral icterus.  Cardiovascular:      Rate and Rhythm: Regular rhythm. Tachycardia present.      Heart sounds: Normal heart sounds.   Pulmonary:      Effort: Respiratory distress present.      Breath sounds: Rales present.   Abdominal:      Palpations: Abdomen is soft.      Tenderness: There is no abdominal tenderness.   Musculoskeletal:      Cervical back: Normal range of motion.      Comments: Left upper extremity fistula with good thrill   Skin:     Findings: No rash.   Neurological:      General: No focal deficit present.      Comments: Baseline confusion                  Procedures:  Procedures      Medical Decision Making:      Comorbidities that affect care:    Osteomyelitis, diabetes, hypertension, Chronic Kidney Disease, Hypertension    External Notes reviewed:    Reviewed admission from 11/1/2024      The following orders were placed and all results were independently analyzed by me:  Orders Placed This Encounter   Procedures    Blood Culture - Blood,    Blood Culture - Blood,    XR Chest 1 View    [place] Draw    Comprehensive Metabolic Panel    BNP    Single High Sensitivity Troponin T    CBC Auto Differential    Lactic Acid, Plasma    High Sensitivity Troponin T 2Hr    NPO Diet NPO Type: Strict NPO    Undress & Gown    Continuous Pulse Oximetry    Vital Signs    Inpatient Nephrology Consult    Inpatient Pulmonology Consult    Inpatient Hospitalist Consult    Oxygen Therapy- Nasal Cannula; Titrate 1-6 LPM Per SpO2; 90 - 95%    ECG 12 Lead ED Triage Standing Order; SOA    Insert Peripheral IV    CBC & Differential    Green Top (Gel)    Lavender Top    Gold Top  - SST    Light Blue Top    Extra Tubes    Extra Tubes    Gray Top       Medications Given in the Emergency Department:  Medications   sodium chloride 0.9 % flush 10 mL (has no administration in time range)        ED Course:    ED Course as of 12/05/24 1824   Thu Dec 05, 2024   1638 Spoke with Dr. Cha who will consult.  If needs dialysis would do it tomorrow [MA]   1638 EKG interpreted by me  Time: 1631  Heart rate 97  A-fib, nonspecific ST changes, no acute ischemic [MA]   1639 Spoke with Dr. Valentin who will consult.  [MA]   1810 Spoke with Dr. Carrneo who agrees to admit [MA]      ED Course User Index  [MA] Enrique Luong MD       Labs:    Lab Results (last 24 hours)       Procedure Component Value Units Date/Time    CBC & Differential [[id number]]  (Abnormal) Collected: 12/05/24 1635    Specimen: Blood Updated: 12/05/24 1642    Narrative:      The following orders were created for panel order CBC & Differential.  Procedure                               Abnormality         Status                     ---------                               -----------         ------                     CBC Auto Differential[[id number]]        Abnormal            Final result                 Please view results for these tests on the individual orders.    Comprehensive Metabolic Panel [[id number]]  (Abnormal) Collected: 12/05/24 1635    Specimen: Blood Updated: 12/05/24 1701     Glucose 107 mg/dL      BUN 12 mg/dL      Creatinine 3.59 mg/dL      Sodium 137 mmol/L      Potassium 3.9 mmol/L      Chloride 97 mmol/L      CO2 30.6 mmol/L      Calcium 9.4 mg/dL      Total Protein 7.4 g/dL      Albumin 3.3 g/dL      ALT (SGPT) 10 U/L      AST (SGOT) 27 U/L      Alkaline Phosphatase 72 U/L      Total Bilirubin 0.6 mg/dL      Globulin 4.1 gm/dL      A/G Ratio 0.8 g/dL      BUN/Creatinine Ratio 3.3     Anion Gap 9.4 mmol/L      eGFR 16.6 mL/min/1.73     Narrative:      GFR Normal >60  Chronic Kidney Disease <60  Kidney Failure <15    The  GFR formula is only valid for adults with stable renal function between ages 18 and 70.    BNP [[id number]]  (Abnormal) Collected: 12/05/24 1635    Specimen: Blood Updated: 12/05/24 1712     proBNP 59,503.0 pg/mL     Narrative:      This assay is used as an aid in the diagnosis of individuals suspected of having heart failure. It can be used as an aid in the diagnosis of acute decompensated heart failure (ADHF) in patients presenting with signs and symptoms of ADHF to the emergency department (ED). In addition, NT-proBNP of <300 pg/mL indicates ADHF is not likely.    Age Range Result Interpretation  NT-proBNP Concentration (pg/mL:      <50             Positive            >450                   Gray                 300-450                    Negative             <300    50-75           Positive            >900                  Gray                300-900                  Negative            <300      >75             Positive            >1800                  Gray                300-1800                  Negative            <300    Single High Sensitivity Troponin T [[id number]]  (Abnormal) Collected: 12/05/24 1635    Specimen: Blood Updated: 12/05/24 1714     HS Troponin T 103 ng/L     Narrative:      High Sensitive Troponin T Reference Range:  <14.0 ng/L- Negative Female for AMI  <22.0 ng/L- Negative Male for AMI  >=14 - Abnormal Female indicating possible myocardial injury.  >=22 - Abnormal Male indicating possible myocardial injury.   Clinicians would have to utilize clinical acumen, EKG, Troponin, and serial changes to determine if it is an Acute Myocardial Infarction or myocardial injury due to an underlying chronic condition.         CBC Auto Differential [[id number]]  (Abnormal) Collected: 12/05/24 1635    Specimen: Blood Updated: 12/05/24 1642     WBC 4.13 10*3/mm3      RBC 3.09 10*6/mm3      Hemoglobin 8.4 g/dL      Hematocrit 29.0 %      MCV 93.9 fL      MCH 27.2 pg      MCHC 29.0 g/dL      RDW 17.9 %       RDW-SD 61.8 fl      MPV 9.4 fL      Platelets 132 10*3/mm3      Neutrophil % 54.5 %      Lymphocyte % 34.1 %      Monocyte % 10.7 %      Eosinophil % 0.0 %      Basophil % 0.5 %      Immature Grans % 0.2 %      Neutrophils, Absolute 2.25 10*3/mm3      Lymphocytes, Absolute 1.41 10*3/mm3      Monocytes, Absolute 0.44 10*3/mm3      Eosinophils, Absolute 0.00 10*3/mm3      Basophils, Absolute 0.02 10*3/mm3      Immature Grans, Absolute 0.01 10*3/mm3      nRBC 0.0 /100 WBC     Blood Culture - Blood, Arm, Left [[id number]] Collected: 12/05/24 1755    Specimen: Blood from Arm, Left Updated: 12/05/24 1759    Blood Culture - Blood, Arm, Left [[id number]] Collected: 12/05/24 1755    Specimen: Blood from Arm, Left Updated: 12/05/24 1759    Lactic Acid, Plasma [[id number]]  (Normal) Collected: 12/05/24 1755    Specimen: Blood Updated: 12/05/24 1819     Lactate 1.2 mmol/L              Imaging:    XR Chest 1 View    Result Date: 12/5/2024  XR CHEST 1 VW Date of Exam: 12/5/2024 4:29 PM EST Indication: SOA Triage Protocol Comparison: 12/1/2024 Findings: Cardiomediastinal silhouette is enlarged, unchanged. Persistent pulmonary vascular congestion with interstitial edema, basal airspace disease and small to moderate bilateral pleural effusions. No pneumothorax. No acute osseous abnormality identified.     Impression: Similar moderate  CHF features. Electronically Signed: Jose Hammond MD  12/5/2024 4:35 PM EST  Workstation ID: [id number]       Differential Diagnosis and Discussion:    Dyspnea: Differential diagnosis includes but is not limited to metabolic acidosis, neurological disorders, psychogenic, asthma, pneumothorax, upper airway obstruction, COPD, pneumonia, noncardiogenic pulmonary edema, interstitial lung disease, anemia, congestive heart failure, and pulmonary embolism    All labs were reviewed and interpreted by me.  All X-rays impressions were independently interpreted by me.  EKG was interpreted by me.    MDM     Patient is  a 79-year-old male who is chronically ill on end-stage renal disease as well as current osteomyelitis who presents with complaints of shortness of breath.  Had progressive shortness of breath and hypoxia at home and was brought here for further eval.  Did receive dialysis today.  On exam has diffuse crackles and likely pulmonary edema.  Was initially started on Vapotherm with some improvement.  Is currently on 5 L as he was not tolerating the Vapotherm because it was irritating him.  He does have coarse breath sounds but appears to be slightly improved.  Pulmonary as well as nephrology has been consulted.  Will admit for further eval.      Total Critical Care time of 33 minutes. Total critical care time documented does not include time spent on separately billed procedures for services of nurses or physician assistants. I personally saw and examined the patient. I have reviewed all diagnostic interpretations and treatment plans as written. I was present for the key portions of any procedures performed and the inclusive time noted in any critical care statement. Critical care time includes patient management by me, time spent at the patients bedside,  time to review lab and imaging results, discussing patient care, documentation in the medical record, and time spent with family or caregiver.          Patient Care Considerations:          Consultants/Shared Management Plan:    Hospitalist: I have discussed the case with Dr. Wang who agrees to accept the patient for admission.  Consultant: I have discussed the case with Dr. Valentin who agrees to consult on the patient.  Consultant: I have discussed the case with Dr. Cha who states will consult.     Social Determinants of Health:          Disposition and Care Coordination:    Admit:   Through independent evaluation of the patient's history, physical, and imperical data, the patient meets criteria for inpatient admission to the hospital.        Final diagnoses:    Acute respiratory failure with hypoxia   Acute pulmonary edema        ED Disposition       ED Disposition   Decision to Admit    Condition   --    Comment   --               This medical record created using voice recognition software.             Enrique Luong MD  12/05/24 [id number]

## 2024-12-05 NOTE — H&P
[facility] HISTORY AND PHYSICAL  Date: 2024   Patient Name: Nelson Wang  : 1945  MRN: [id number]  Primary Care Physician:  Domingo Bravo MD  Date of admission: 2024    Subjective   Subjective     Chief Complaint: Shortness of breath and hypoxia    HPI:    Nelson Wang is a 79 y.o. male past medical history of end-stage renal disease on dialysis, type 2 diabetes, dementia, vitiligo, discitis on vancomycin currently, bipolar disorder, as needed chronic respiratory failure, A-fib on apixaban, and restrictive lung disease who presents to the emergency department for evaluation of hypoxia    The patient was seen in the ER on  due to a fall where he hit his head and had a small contusion.  Today he was doing okay but was a little bit restless.  Some point his oxygen was checked and it was in the 50s.  It is unclear whether he supposed to wear oxygen at all times or not.  As result of his hypoxia he came to ER for further evaluation.  He did have dialysis today.  He had no fevers.  He had no chills.    In the emergency patient's vital signs are as follows: Temperature 96, pulse 20, blood pressure 164/84, 85% on home oxygen.  Initially required nonrebreather to get his sats back up.  CBC shows white blood cell count of 4.13, hemoglobin 8.4, platelets of 132.  CMP shows a bicarb of 30.6 and a creatinine of 3.59.  Patient's BNP is 59,000 and troponin is 103.  Chest x-ray shows moderate CHF features.  Patient be admitted to hospital for management of heart failure due to end-stage renal disease.    All systems reviewed abnormalities noted above    Personal History     Past Medical History:  End-stage renal disease  Type 2 diabetes  Dementia  Vitiligo  Discitis  Bipolar disorder  Chronic respiratory failure due to end-stage renal disease  Atrial fibrillation on apixaban  Psoriasis  Restrictive lung disease    Past Surgical History:  Ankle surgery  Appendectomy  AV fistula  formation  Bronchoscopy  cardiac catheterization   colonoscopy   Endoscopy  Insertion of dialysis catheter    Family History:   Cancer  Colon cancer   diabetes \  heart disease    Social History:   Former cigarette smoker.  Rarely has a drink    Home Medications:  Budeson-Glycopyrrol-Formoterol, Insulin Lispro (1 Unit Dial), Insulin Pen Needle, Levothyroxine Sodium, Methoxy PEG-Epoetin Beta, QUEtiapine, Risankizumab-rzaa, Vancomycin HCl, acetaminophen, allopurinol, apixaban, atorvastatin, donepezil, famotidine, naloxone, sertraline, sevelamer, and torsemide    Allergies:  No Known Allergies      Objective   Objective     Vitals:   Heart Rate:  [92-99] 96  Resp:  [20] 20  BP: (137-164)/(65-85) 164/85  Flow (L/min) (Oxygen Therapy):  [15] 15    Physical Exam    Constitutional: Chronically ill-appearing.  Slightly confused.   Eyes: Pupils equal, sclerae anicteric, no conjunctival injection   HENT: NCAT, mucous membranes moist   Neck: Supple, no thyromegaly, no lymphadenopathy, trachea midline   Respiratory: Clear to auscultation bilaterally, nonlabored respirations    Cardiovascular: RRR, no murmurs, rubs, or gallops, palpable pedal pulses bilaterally   Gastrointestinal: Positive bowel sounds, soft, nontender, nondistended   Musculoskeletal: No bilateral ankle edema, no clubbing or cyanosis to extremities   Psychiatric: Appropriate affect, cooperative   Neurologic: Oriented x 1-2, strength symmetric in all extremities, Cranial Nerves grossly intact to confrontation, speech clear   Skin: No rashes     Result Review    Result Review:  I have personally reviewed the results from the time of this admission to 12/5/2024 18:08 EST and agree with these findings:  Imaging and labs reviewed    Assessment & Plan   Assessment / Plan     Assessment/Plan:   Acute on chronic hypoxic respiratory failure  End-stage renal disease on dialysis leading to heart failure  Acute diastolic heart failure with EF of 56-60  Discitis on  vancomycin  Type 2 diabetes  A-fib on apixaban      Plan:  -- Admit to hospital service  -- Consult nephrology  -- Consult pulmonology  -- Patient had dialysis today so cannot get further dialysis tonight  -- I will get CT scan of the chest to see if he has a pleural effusion as it is hard to tell on his chest x-ray.  -- Brovana/Pulmicort/DuoNebs  -- Oxygen for saturations less than 90%  -- He received his home vancomycin today  -- Will discuss with nephrology on dosing of vancomycin for discitis  -- Sliding-scale for type 2 diabetes  -- Will give the patient a one-time dose of 3 mg of Bumex  --If CTA chest shows pleural effusion we will consult pulmonology at that time  -- Will not start antibiotics at this time    VTE Prophylaxis:  Continue home apixaban        CODE STATUS:     Full code    Admission Status:  I believe this patient meets admission status.    Electronically signed by Willem Carreno MD, 12/05/24, 6:08 PM EST.

## 2024-12-06 ENCOUNTER — APPOINTMENT (OUTPATIENT)
Dept: GENERAL RADIOLOGY | Facility: HOSPITAL | Age: 79
End: 2024-12-06
Payer: MEDICARE

## 2024-12-06 ENCOUNTER — APPOINTMENT (OUTPATIENT)
Dept: CT IMAGING | Facility: HOSPITAL | Age: 79
End: 2024-12-06
Payer: MEDICARE

## 2024-12-06 LAB
ABO GROUP BLD: NORMAL
ACB CMPLX DNA BAL NAA+NON-PRB-NCNCRNG: NOT DETECTED
ALBUMIN FLD-MCNC: 1.5 G/DL
ALBUMIN SERPL-MCNC: 2.7 G/DL (ref 3.5–5.2)
ALBUMIN SERPL-MCNC: 3 G/DL (ref 3.5–5.2)
ALBUMIN/GLOB SERPL: 0.8 G/DL
ALP SERPL-CCNC: 69 U/L (ref 39–117)
ALT SERPL W P-5'-P-CCNC: 9 U/L (ref 1–41)
ANION GAP SERPL CALCULATED.3IONS-SCNC: 10.7 MMOL/L (ref 5–15)
ANION GAP SERPL CALCULATED.3IONS-SCNC: 6.5 MMOL/L (ref 5–15)
ANISOCYTOSIS BLD QL: NORMAL
APPEARANCE FLD: ABNORMAL
APTT PPP: 31.1 SECONDS (ref 24.2–34.2)
ARTERIAL PATENCY WRIST A: ABNORMAL
ARTERIAL PATENCY WRIST A: ABNORMAL
ARTERIAL PATENCY WRIST A: POSITIVE
AST SERPL-CCNC: 28 U/L (ref 1–40)
ATMOSPHERIC PRESS: 751.3 MMHG
ATMOSPHERIC PRESS: 751.8 MMHG
ATMOSPHERIC PRESS: 752.1 MMHG
BACTERIA BLD CULT: ABNORMAL
BACTERIA ID TEST ISLT QL CULT: ABNORMAL
BASE EXCESS BLDA CALC-SCNC: 1 MMOL/L (ref -2–2)
BASE EXCESS BLDA CALC-SCNC: 5 MMOL/L (ref -2–2)
BASE EXCESS BLDA CALC-SCNC: 8.6 MMOL/L (ref -2–2)
BASOPHILS # BLD AUTO: 0.02 10*3/MM3 (ref 0–0.2)
BASOPHILS # BLD AUTO: 0.03 10*3/MM3 (ref 0–0.2)
BASOPHILS NFR BLD AUTO: 0.3 % (ref 0–1.5)
BASOPHILS NFR BLD AUTO: 0.8 % (ref 0–1.5)
BASOPHILS NFR FLD: 0 %
BDY SITE: ABNORMAL
BILIRUB SERPL-MCNC: 0.5 MG/DL (ref 0–1.2)
BLACTX-M ISLT/SPM QL: NORMAL
BLAIMP ISLT/SPM QL: NORMAL
BLAKPC ISLT/SPM QL: NORMAL
BLAOXA-48-LIKE ISLT/SPM QL: NORMAL
BLAVIM ISLT/SPM QL: NORMAL
BLD GP AB SCN SERPL QL: NEGATIVE
BOTTLE TYPE: ABNORMAL
BUN BLDA-MCNC: 16 MG/DL (ref 8–23)
BUN BLDA-MCNC: 18 MG/DL (ref 8–23)
BUN SERPL-MCNC: 15 MG/DL (ref 8–23)
BUN SERPL-MCNC: 16 MG/DL (ref 8–23)
BUN/CREAT SERPL: 3.3 (ref 7–25)
BUN/CREAT SERPL: 3.3 (ref 7–25)
C PNEUM DNA NPH QL NAA+NON-PROBE: NOT DETECTED
CA-I BLDA-SCNC: 1.24 MMOL/L (ref 1.13–1.32)
CA-I BLDA-SCNC: 1.34 MMOL/L (ref 1.13–1.32)
CA-I BLDA-SCNC: 1.53 MMOL/L (ref 1.13–1.32)
CALCIUM SPEC-SCNC: 10.1 MG/DL (ref 8.6–10.5)
CALCIUM SPEC-SCNC: 9.1 MG/DL (ref 8.6–10.5)
CHLORIDE BLDA-SCNC: 102 MMOL/L (ref 98–107)
CHLORIDE BLDA-SCNC: 103 MMOL/L (ref 98–107)
CHLORIDE BLDA-SCNC: 97 MMOL/L (ref 98–107)
CHLORIDE SERPL-SCNC: 101 MMOL/L (ref 98–107)
CHLORIDE SERPL-SCNC: 95 MMOL/L (ref 98–107)
CHOLESTEROL FLUID: 42 MG/DL
CILIATED BAL QL: 9 %
CO2 BLDA-SCNC: 31.9 MMOL/L (ref 23–27)
CO2 BLDA-SCNC: >34.54 MMOL/L (ref 23–27)
CO2 SERPL-SCNC: 28.3 MMOL/L (ref 22–29)
CO2 SERPL-SCNC: 36.5 MMOL/L (ref 22–29)
COLOR FLD: ABNORMAL
CREAT BLDA-MCNC: 4.46 MG/DL (ref 0.6–1.3)
CREAT BLDA-MCNC: 4.59 MG/DL (ref 0.6–1.3)
CREAT SERPL-MCNC: 4.53 MG/DL (ref 0.76–1.27)
CREAT SERPL-MCNC: 4.9 MG/DL (ref 0.76–1.27)
D-LACTATE SERPL-SCNC: 1.6 MMOL/L
D-LACTATE SERPL-SCNC: 1.8 MMOL/L
D-LACTATE SERPL-SCNC: 2.2 MMOL/L (ref 0.5–2)
D-LACTATE SERPL-SCNC: 2.3 MMOL/L (ref 0.5–2)
D-LACTATE SERPL-SCNC: 3.8 MMOL/L
D-LACTATE SERPL-SCNC: 3.9 MMOL/L (ref 0.5–2)
DEPRECATED RDW RBC AUTO: 60.6 FL (ref 37–54)
DEPRECATED RDW RBC AUTO: 62.1 FL (ref 37–54)
E CLOAC COMP DNA BAL NAA+NON-PRB-NCNCRNG: NOT DETECTED
E COLI DNA BAL NAA+NON-PRB-NCNCRNG: NOT DETECTED
EGFRCR SERPLBLD CKD-EPI 2021: 11.4 ML/MIN/1.73
EGFRCR SERPLBLD CKD-EPI 2021: 12.6 ML/MIN/1.73
EOSINOPHIL # BLD AUTO: 0.07 10*3/MM3 (ref 0–0.4)
EOSINOPHIL # BLD AUTO: 0.09 10*3/MM3 (ref 0–0.4)
EOSINOPHIL NFR BLD AUTO: 1.2 % (ref 0.3–6.2)
EOSINOPHIL NFR BLD AUTO: 2.4 % (ref 0.3–6.2)
EOSINOPHIL NFR FLD MANUAL: 6 %
ERYTHROCYTE [DISTWIDTH] IN BLOOD BY AUTOMATED COUNT: 17.6 % (ref 12.3–15.4)
ERYTHROCYTE [DISTWIDTH] IN BLOOD BY AUTOMATED COUNT: 17.7 % (ref 12.3–15.4)
FIBRINOGEN PPP-MCNC: 484 MG/DL (ref 215–521)
FLUAV SUBTYP SPEC NAA+PROBE: NOT DETECTED
FLUBV RNA ISLT QL NAA+PROBE: NOT DETECTED
GLOBULIN UR ELPH-MCNC: 4 GM/DL
GLUCOSE BLDC GLUCOMTR-MCNC: 104 MG/DL (ref 70–99)
GLUCOSE BLDC GLUCOMTR-MCNC: 125 MG/DL (ref 70–99)
GLUCOSE BLDC GLUCOMTR-MCNC: 130 MG/DL (ref 70–99)
GLUCOSE BLDC GLUCOMTR-MCNC: 138 MG/DL (ref 70–99)
GLUCOSE BLDC GLUCOMTR-MCNC: 66 MG/DL (ref 70–99)
GLUCOSE BLDC GLUCOMTR-MCNC: 92 MG/DL (ref 70–99)
GLUCOSE BLDC GLUCOMTR-MCNC: 96 MG/DL (ref 70–99)
GLUCOSE FLD-MCNC: 48 MG/DL
GLUCOSE SERPL-MCNC: 125 MG/DL (ref 65–99)
GLUCOSE SERPL-MCNC: 86 MG/DL (ref 65–99)
GP B STREP DNA BAL NAA+NON-PRB-NCNCRNG: NOT DETECTED
HADV DNA SPEC NAA+PROBE: NOT DETECTED
HAEM INFLU DNA BAL NAA+NON-PRB-NCNCRNG: NOT DETECTED
HBV SURFACE AG SERPL QL IA: NORMAL
HCO3 BLDA-SCNC: 32.4 MMOL/L (ref 22–26)
HCO3 BLDA-SCNC: 32.8 MMOL/L (ref 22–26)
HCO3 BLDA-SCNC: 38.9 MMOL/L (ref 22–26)
HCOV RNA LOWER RESP QL NAA+NON-PROBE: NOT DETECTED
HCT VFR BLD AUTO: 24.6 % (ref 37.5–51)
HCT VFR BLD AUTO: 28.2 % (ref 37.5–51)
HCT VFR BLD CALC: 23 % (ref 38–51)
HCT VFR BLD CALC: 33 % (ref 38–51)
HCT VFR BLD CALC: 38 % (ref 38–51)
HEMODILUTION: NO
HGB BLD-MCNC: 6.9 G/DL (ref 13–17.7)
HGB BLD-MCNC: 8.1 G/DL (ref 13–17.7)
HGB BLDA-MCNC: 11.3 G/DL (ref 12–18)
HGB BLDA-MCNC: 13 G/DL (ref 12–18)
HGB BLDA-MCNC: 8 G/DL (ref 12–18)
HMPV RNA NPH QL NAA+NON-PROBE: NOT DETECTED
HOLD SPECIMEN: NORMAL
HPIV RNA LOWER RESP QL NAA+NON-PROBE: NOT DETECTED
IMM GRANULOCYTES # BLD AUTO: 0.01 10*3/MM3 (ref 0–0.05)
IMM GRANULOCYTES # BLD AUTO: 0.1 10*3/MM3 (ref 0–0.05)
IMM GRANULOCYTES NFR BLD AUTO: 0.3 % (ref 0–0.5)
IMM GRANULOCYTES NFR BLD AUTO: 1.7 % (ref 0–0.5)
INHALED O2 CONCENTRATION: 100 %
INHALED O2 CONCENTRATION: 100 %
INHALED O2 CONCENTRATION: 50 %
INR PPP: 1.17 (ref 0.86–1.15)
K AEROGENES DNA BAL NAA+NON-PRB-NCNCRNG: NOT DETECTED
K OXYTOCA DNA BAL NAA+NON-PRB-NCNCRNG: NOT DETECTED
K PNEU GRP DNA BAL NAA+NON-PRB-NCNCRNG: NOT DETECTED
L PNEUMO DNA LOWER RESP QL NAA+NON-PROBE: NOT DETECTED
LDH FLD-CCNC: 312 U/L
LYMPHOCYTES # BLD AUTO: 1.41 10*3/MM3 (ref 0.7–3.1)
LYMPHOCYTES # BLD AUTO: 3.88 10*3/MM3 (ref 0.7–3.1)
LYMPHOCYTES NFR BLD AUTO: 37.8 % (ref 19.6–45.3)
LYMPHOCYTES NFR BLD AUTO: 65.2 % (ref 19.6–45.3)
LYMPHOCYTES NFR FLD MANUAL: 76 %
LYMPHOCYTES NFR FLD MANUAL: 79 %
Lab: ABNORMAL
Lab: ABNORMAL
M CATARRHALIS DNA BAL NAA+NON-PRB-NCNCRNG: NOT DETECTED
M PNEUMO IGG SER IA-ACNC: NOT DETECTED
MACROCYTES BLD QL SMEAR: NORMAL
MACROPHAGE FLUID %: 4 %
MACROPHAGE FLUID %: 8 %
MAGNESIUM SERPL-MCNC: 1.8 MG/DL (ref 1.6–2.4)
MAGNESIUM SERPL-MCNC: 1.9 MG/DL (ref 1.6–2.4)
MCH RBC QN AUTO: 27.1 PG (ref 26.6–33)
MCH RBC QN AUTO: 27.3 PG (ref 26.6–33)
MCHC RBC AUTO-ENTMCNC: 28 G/DL (ref 31.5–35.7)
MCHC RBC AUTO-ENTMCNC: 28.7 G/DL (ref 31.5–35.7)
MCV RBC AUTO: 94.9 FL (ref 79–97)
MCV RBC AUTO: 96.5 FL (ref 79–97)
MECA+MECC ISLT/SPM QL: NORMAL
MODALITY: ABNORMAL
MONOCYTES # BLD AUTO: 0.48 10*3/MM3 (ref 0.1–0.9)
MONOCYTES # BLD AUTO: 0.53 10*3/MM3 (ref 0.1–0.9)
MONOCYTES NFR BLD AUTO: 14.2 % (ref 5–12)
MONOCYTES NFR BLD AUTO: 8.1 % (ref 5–12)
MONOCYTES NFR FLD: 13 %
NDM GENE: NORMAL
NEUTROPHILS NFR BLD AUTO: 1.4 10*3/MM3 (ref 1.7–7)
NEUTROPHILS NFR BLD AUTO: 1.66 10*3/MM3 (ref 1.7–7)
NEUTROPHILS NFR BLD AUTO: 23.5 % (ref 42.7–76)
NEUTROPHILS NFR BLD AUTO: 44.5 % (ref 42.7–76)
NEUTROPHILS NFR FLD MANUAL: 1 %
NEUTROPHILS NFR FLD MANUAL: 5 %
NOTIFIED WHO: ABNORMAL
NRBC BLD AUTO-RTO: 0 /100 WBC (ref 0–0.2)
NRBC BLD AUTO-RTO: 0 /100 WBC (ref 0–0.2)
NUC CELL # FLD: 204 /MM3
P AERUGINOSA DNA BAL NAA+NON-PRB-NCNCRNG: NOT DETECTED
PCO2 BLDA: 102.2 MM HG (ref 35–45)
PCO2 BLDA: 106.6 MM HG (ref 35–45)
PCO2 BLDA: 59.7 MM HG (ref 35–45)
PEEP RESPIRATORY: 5 CM[H2O]
PEEP RESPIRATORY: 5 CM[H2O]
PH BLDA: 7.12 PH UNITS (ref 7.35–7.45)
PH BLDA: 7.17 PH UNITS (ref 7.35–7.45)
PH BLDA: 7.34 PH UNITS (ref 7.35–7.45)
PH FLD: 7 [PH]
PHOSPHATE SERPL-MCNC: 5.7 MG/DL (ref 2.5–4.5)
PLATELET # BLD AUTO: 136 10*3/MM3 (ref 140–450)
PLATELET # BLD AUTO: 141 10*3/MM3 (ref 140–450)
PMV BLD AUTO: 10.5 FL (ref 6–12)
PMV BLD AUTO: 10.7 FL (ref 6–12)
PO2 BLD: 219 MM[HG] (ref 0–500)
PO2 BLD: 306 MM[HG] (ref 0–500)
PO2 BLD: 362 MM[HG] (ref 0–500)
PO2 BLDA: 109.7 MM HG (ref 80–100)
PO2 BLDA: 306.1 MM HG (ref 80–100)
PO2 BLDA: 362.3 MM HG (ref 80–100)
POTASSIUM BLDA-SCNC: 3.1 MMOL/L (ref 3.5–5)
POTASSIUM BLDA-SCNC: 3.2 MMOL/L (ref 3.5–5)
POTASSIUM BLDA-SCNC: 3.4 MMOL/L (ref 3.5–5)
POTASSIUM SERPL-SCNC: 3.6 MMOL/L (ref 3.5–5.2)
POTASSIUM SERPL-SCNC: 4.1 MMOL/L (ref 3.5–5.2)
PROCALCITONIN SERPL-MCNC: 0.27 NG/ML (ref 0–0.25)
PROLACTIN SERPL-MCNC: 38 NG/ML (ref 4.04–15.2)
PROT FLD-MCNC: 3.2 G/DL
PROT SERPL-MCNC: 7 G/DL (ref 6–8.5)
PROTEUS SP DNA BAL NAA+NON-PRB-NCNCRNG: NOT DETECTED
PROTHROMBIN TIME: 15.2 SECONDS (ref 11.8–14.9)
RBC # BLD AUTO: 2.55 10*6/MM3 (ref 4.14–5.8)
RBC # BLD AUTO: 2.97 10*6/MM3 (ref 4.14–5.8)
RBC # FLD AUTO: ABNORMAL /MM3
READ BACK: YES
RESPIRATORY RATE: 24
RESPIRATORY RATE: 24
RH BLD: POSITIVE
RHINOVIRUS RNA SPEC NAA+PROBE: NOT DETECTED
RSV RNA NPH QL NAA+NON-PROBE: NOT DETECTED
S AUREUS DNA BAL NAA+NON-PRB-NCNCRNG: NOT DETECTED
S MARCESCENS DNA BAL NAA+NON-PRB-NCNCRNG: NOT DETECTED
S PNEUM DNA BAL NAA+NON-PRB-NCNCRNG: NOT DETECTED
S PYO DNA BAL NAA+NON-PRB-NCNCRNG: NOT DETECTED
SAO2 % BLDCOA: 97.8 % (ref 95–99)
SAO2 % BLDCOA: 99.8 % (ref 95–99)
SAO2 % BLDCOA: 99.9 % (ref 95–99)
SET MECH RESP RATE: 22
SMALL PLATELETS BLD QL SMEAR: NORMAL
SODIUM BLD-SCNC: 145 MMOL/L (ref 131–143)
SODIUM SERPL-SCNC: 134 MMOL/L (ref 136–145)
SODIUM SERPL-SCNC: 144 MMOL/L (ref 136–145)
T&S EXPIRATION DATE: NORMAL
TRIGL FLD-MCNC: 37 MG/DL
TROPONIN T SERPL HS-MCNC: 128 NG/L
VANCOMYCIN SERPL-MCNC: 27 MCG/ML (ref 5–40)
VENTILATOR MODE: AC
VENTILATOR MODE: AC
VISUAL PRESENCE OF BLOOD: NORMAL
VT ON VENT VENT: 400 ML
VT ON VENT VENT: 420 ML
WBC MORPH BLD: NORMAL
WBC NRBC COR # BLD AUTO: 3.73 10*3/MM3 (ref 3.4–10.8)
WBC NRBC COR # BLD AUTO: 5.95 10*3/MM3 (ref 3.4–10.8)
WHOLE BLOOD HOLD COAG: NORMAL
WHOLE BLOOD HOLD COAG: NORMAL

## 2024-12-06 PROCEDURE — 82803 BLOOD GASES ANY COMBINATION: CPT

## 2024-12-06 PROCEDURE — 25010000002 PROPOFOL 10 MG/ML EMULSION: Performed by: NURSE PRACTITIONER

## 2024-12-06 PROCEDURE — 88108 CYTOPATH CONCENTRATE TECH: CPT | Performed by: INTERNAL MEDICINE

## 2024-12-06 PROCEDURE — 36600 WITHDRAWAL OF ARTERIAL BLOOD: CPT

## 2024-12-06 PROCEDURE — 94799 UNLISTED PULMONARY SVC/PX: CPT

## 2024-12-06 PROCEDURE — 87071 CULTURE AEROBIC QUANT OTHER: CPT | Performed by: INTERNAL MEDICINE

## 2024-12-06 PROCEDURE — 31645 BRNCHSC W/THER ASPIR 1ST: CPT | Performed by: INTERNAL MEDICINE

## 2024-12-06 PROCEDURE — 25010000002 PIPERACILLIN SOD-TAZOBACTAM PER 1 G: Performed by: NURSE PRACTITIONER

## 2024-12-06 PROCEDURE — 71045 X-RAY EXAM CHEST 1 VIEW: CPT

## 2024-12-06 PROCEDURE — 86900 BLOOD TYPING SEROLOGIC ABO: CPT

## 2024-12-06 PROCEDURE — 87205 SMEAR GRAM STAIN: CPT | Performed by: INTERNAL MEDICINE

## 2024-12-06 PROCEDURE — 87633 RESP VIRUS 12-25 TARGETS: CPT | Performed by: INTERNAL MEDICINE

## 2024-12-06 PROCEDURE — 85730 THROMBOPLASTIN TIME PARTIAL: CPT | Performed by: NURSE PRACTITIONER

## 2024-12-06 PROCEDURE — 85384 FIBRINOGEN ACTIVITY: CPT | Performed by: NURSE PRACTITIONER

## 2024-12-06 PROCEDURE — 82042 OTHER SOURCE ALBUMIN QUAN EA: CPT | Performed by: INTERNAL MEDICINE

## 2024-12-06 PROCEDURE — 84100 ASSAY OF PHOSPHORUS: CPT | Performed by: INTERNAL MEDICINE

## 2024-12-06 PROCEDURE — 32556 INSERT CATH PLEURA W/O IMAGE: CPT | Performed by: INTERNAL MEDICINE

## 2024-12-06 PROCEDURE — 94002 VENT MGMT INPAT INIT DAY: CPT

## 2024-12-06 PROCEDURE — 32557 INSERT CATH PLEURA W/ IMAGE: CPT | Performed by: INTERNAL MEDICINE

## 2024-12-06 PROCEDURE — 83615 LACTATE (LD) (LDH) ENZYME: CPT | Performed by: INTERNAL MEDICINE

## 2024-12-06 PROCEDURE — 32555 ASPIRATE PLEURA W/ IMAGING: CPT | Performed by: INTERNAL MEDICINE

## 2024-12-06 PROCEDURE — 80053 COMPREHEN METABOLIC PANEL: CPT | Performed by: INTERNAL MEDICINE

## 2024-12-06 PROCEDURE — 25010000002 EPINEPHRINE 1 MG/10ML SOLUTION PREFILLED SYRINGE: Performed by: INTERNAL MEDICINE

## 2024-12-06 PROCEDURE — 84157 ASSAY OF PROTEIN OTHER: CPT | Performed by: INTERNAL MEDICINE

## 2024-12-06 PROCEDURE — 0B9F8ZX DRAINAGE OF RIGHT LOWER LUNG LOBE, VIA NATURAL OR ARTIFICIAL OPENING ENDOSCOPIC, DIAGNOSTIC: ICD-10-PCS | Performed by: INTERNAL MEDICINE

## 2024-12-06 PROCEDURE — 82945 GLUCOSE OTHER FLUID: CPT | Performed by: INTERNAL MEDICINE

## 2024-12-06 PROCEDURE — 85025 COMPLETE CBC W/AUTO DIFF WBC: CPT | Performed by: INTERNAL MEDICINE

## 2024-12-06 PROCEDURE — 0W993ZZ DRAINAGE OF RIGHT PLEURAL CAVITY, PERCUTANEOUS APPROACH: ICD-10-PCS | Performed by: INTERNAL MEDICINE

## 2024-12-06 PROCEDURE — 92950 HEART/LUNG RESUSCITATION CPR: CPT

## 2024-12-06 PROCEDURE — 99223 1ST HOSP IP/OBS HIGH 75: CPT | Performed by: INTERNAL MEDICINE

## 2024-12-06 PROCEDURE — 82948 REAGENT STRIP/BLOOD GLUCOSE: CPT

## 2024-12-06 PROCEDURE — 99291 CRITICAL CARE FIRST HOUR: CPT | Performed by: INTERNAL MEDICINE

## 2024-12-06 PROCEDURE — 87075 CULTR BACTERIA EXCEPT BLOOD: CPT | Performed by: INTERNAL MEDICINE

## 2024-12-06 PROCEDURE — 87116 MYCOBACTERIA CULTURE: CPT | Performed by: INTERNAL MEDICINE

## 2024-12-06 PROCEDURE — 80202 ASSAY OF VANCOMYCIN: CPT | Performed by: STUDENT IN AN ORGANIZED HEALTH CARE EDUCATION/TRAINING PROGRAM

## 2024-12-06 PROCEDURE — 0W9930Z DRAINAGE OF RIGHT PLEURAL CAVITY WITH DRAINAGE DEVICE, PERCUTANEOUS APPROACH: ICD-10-PCS | Performed by: INTERNAL MEDICINE

## 2024-12-06 PROCEDURE — 86901 BLOOD TYPING SEROLOGIC RH(D): CPT | Performed by: NURSE PRACTITIONER

## 2024-12-06 PROCEDURE — 0BH17EZ INSERTION OF ENDOTRACHEAL AIRWAY INTO TRACHEA, VIA NATURAL OR ARTIFICIAL OPENING: ICD-10-PCS | Performed by: INTERNAL MEDICINE

## 2024-12-06 PROCEDURE — 36620 INSERTION CATHETER ARTERY: CPT | Performed by: NURSE PRACTITIONER

## 2024-12-06 PROCEDURE — 83605 ASSAY OF LACTIC ACID: CPT

## 2024-12-06 PROCEDURE — 76937 US GUIDE VASCULAR ACCESS: CPT | Performed by: NURSE PRACTITIONER

## 2024-12-06 PROCEDURE — 87102 FUNGUS ISOLATION CULTURE: CPT | Performed by: INTERNAL MEDICINE

## 2024-12-06 PROCEDURE — 80047 BASIC METABLC PNL IONIZED CA: CPT

## 2024-12-06 PROCEDURE — 4A133J1 MONITORING OF ARTERIAL PULSE, PERIPHERAL, PERCUTANEOUS APPROACH: ICD-10-PCS | Performed by: INTERNAL MEDICINE

## 2024-12-06 PROCEDURE — 94664 DEMO&/EVAL PT USE INHALER: CPT

## 2024-12-06 PROCEDURE — 25010000002 PROPOFOL 10 MG/ML EMULSION

## 2024-12-06 PROCEDURE — 71250 CT THORAX DX C-: CPT

## 2024-12-06 PROCEDURE — 94640 AIRWAY INHALATION TREATMENT: CPT

## 2024-12-06 PROCEDURE — 83735 ASSAY OF MAGNESIUM: CPT | Performed by: INTERNAL MEDICINE

## 2024-12-06 PROCEDURE — 31500 INSERT EMERGENCY AIRWAY: CPT | Performed by: INTERNAL MEDICINE

## 2024-12-06 PROCEDURE — 5A1945Z RESPIRATORY VENTILATION, 24-96 CONSECUTIVE HOURS: ICD-10-PCS | Performed by: INTERNAL MEDICINE

## 2024-12-06 PROCEDURE — 89051 BODY FLUID CELL COUNT: CPT | Performed by: INTERNAL MEDICINE

## 2024-12-06 PROCEDURE — 31624 DX BRONCHOSCOPE/LAVAGE: CPT | Performed by: INTERNAL MEDICINE

## 2024-12-06 PROCEDURE — 25810000003 SODIUM CHLORIDE 0.9 % SOLUTION: Performed by: INTERNAL MEDICINE

## 2024-12-06 PROCEDURE — 88184 FLOWCYTOMETRY/ TC 1 MARKER: CPT | Performed by: INTERNAL MEDICINE

## 2024-12-06 PROCEDURE — P9016 RBC LEUKOCYTES REDUCED: HCPCS

## 2024-12-06 PROCEDURE — 86923 COMPATIBILITY TEST ELECTRIC: CPT

## 2024-12-06 PROCEDURE — 80051 ELECTROLYTE PANEL: CPT

## 2024-12-06 PROCEDURE — 4A133B1 MONITORING OF ARTERIAL PRESSURE, PERIPHERAL, PERCUTANEOUS APPROACH: ICD-10-PCS | Performed by: INTERNAL MEDICINE

## 2024-12-06 PROCEDURE — 86850 RBC ANTIBODY SCREEN: CPT | Performed by: NURSE PRACTITIONER

## 2024-12-06 PROCEDURE — 25010000003 VASOPRESSIN 0.2 UNITS/ML SOLUTION: Performed by: INTERNAL MEDICINE

## 2024-12-06 PROCEDURE — 84484 ASSAY OF TROPONIN QUANT: CPT | Performed by: STUDENT IN AN ORGANIZED HEALTH CARE EDUCATION/TRAINING PROGRAM

## 2024-12-06 PROCEDURE — 36430 TRANSFUSION BLD/BLD COMPNT: CPT

## 2024-12-06 PROCEDURE — 84311 SPECTROPHOTOMETRY: CPT | Performed by: INTERNAL MEDICINE

## 2024-12-06 PROCEDURE — 88185 FLOWCYTOMETRY/TC ADD-ON: CPT

## 2024-12-06 PROCEDURE — 84145 PROCALCITONIN (PCT): CPT | Performed by: INTERNAL MEDICINE

## 2024-12-06 PROCEDURE — 82330 ASSAY OF CALCIUM: CPT

## 2024-12-06 PROCEDURE — 84478 ASSAY OF TRIGLYCERIDES: CPT | Performed by: INTERNAL MEDICINE

## 2024-12-06 PROCEDURE — 25010000002 FENTANYL CITRATE (PF) 50 MCG/ML SOLUTION

## 2024-12-06 PROCEDURE — 99291 CRITICAL CARE FIRST HOUR: CPT | Performed by: STUDENT IN AN ORGANIZED HEALTH CARE EDUCATION/TRAINING PROGRAM

## 2024-12-06 PROCEDURE — 92950 HEART/LUNG RESUSCITATION CPR: CPT | Performed by: INTERNAL MEDICINE

## 2024-12-06 PROCEDURE — 36556 INSERT NON-TUNNEL CV CATH: CPT | Performed by: NURSE PRACTITIONER

## 2024-12-06 PROCEDURE — 02HV33Z INSERTION OF INFUSION DEVICE INTO SUPERIOR VENA CAVA, PERCUTANEOUS APPROACH: ICD-10-PCS | Performed by: INTERNAL MEDICINE

## 2024-12-06 PROCEDURE — 99292 CRITICAL CARE ADDL 30 MIN: CPT | Performed by: INTERNAL MEDICINE

## 2024-12-06 PROCEDURE — 85007 BL SMEAR W/DIFF WBC COUNT: CPT | Performed by: INTERNAL MEDICINE

## 2024-12-06 PROCEDURE — 5A12012 PERFORMANCE OF CARDIAC OUTPUT, SINGLE, MANUAL: ICD-10-PCS | Performed by: INTERNAL MEDICINE

## 2024-12-06 PROCEDURE — 82948 REAGENT STRIP/BLOOD GLUCOSE: CPT | Performed by: INTERNAL MEDICINE

## 2024-12-06 PROCEDURE — 86900 BLOOD TYPING SEROLOGIC ABO: CPT | Performed by: NURSE PRACTITIONER

## 2024-12-06 PROCEDURE — 87206 SMEAR FLUORESCENT/ACID STAI: CPT | Performed by: INTERNAL MEDICINE

## 2024-12-06 PROCEDURE — 36415 COLL VENOUS BLD VENIPUNCTURE: CPT | Performed by: INTERNAL MEDICINE

## 2024-12-06 PROCEDURE — 85610 PROTHROMBIN TIME: CPT | Performed by: NURSE PRACTITIONER

## 2024-12-06 PROCEDURE — 87070 CULTURE OTHR SPECIMN AEROBIC: CPT | Performed by: INTERNAL MEDICINE

## 2024-12-06 PROCEDURE — 83986 ASSAY PH BODY FLUID NOS: CPT | Performed by: INTERNAL MEDICINE

## 2024-12-06 RX ORDER — NOREPINEPHRINE BITARTRATE 0.03 MG/ML
.02-.3 INJECTION, SOLUTION INTRAVENOUS
Status: DISCONTINUED | OUTPATIENT
Start: 2024-12-06 | End: 2024-12-12

## 2024-12-06 RX ORDER — PROPOFOL 10 MG/ML
VIAL (ML) INTRAVENOUS
Status: COMPLETED
Start: 2024-12-06 | End: 2024-12-06

## 2024-12-06 RX ORDER — CALCIUM CHLORIDE, MAGNESIUM CHLORIDE, SODIUM CHLORIDE, SODIUM BICARBONATE, POTASSIUM CHLORIDE AND SODIUM PHOSPHATE DIBASIC DIHYDRATE 3.68; 3.05; 6.34; 3.09; .314; .187 G/L; G/L; G/L; G/L; G/L; G/L
750 INJECTION INTRAVENOUS CONTINUOUS
Status: DISCONTINUED | OUTPATIENT
Start: 2024-12-06 | End: 2024-12-08

## 2024-12-06 RX ORDER — FENTANYL CITRATE-0.9 % NACL/PF 10 MCG/ML
50-300 PLASTIC BAG, INJECTION (ML) INTRAVENOUS
Status: DISCONTINUED | OUTPATIENT
Start: 2024-12-06 | End: 2024-12-07

## 2024-12-06 RX ORDER — TRANEXAMIC ACID 100 MG/ML
500 INJECTION, SOLUTION INTRAVENOUS ONCE
Status: COMPLETED | OUTPATIENT
Start: 2024-12-06 | End: 2024-12-06

## 2024-12-06 RX ORDER — VANCOMYCIN/0.9 % SOD CHLORIDE 1.5G/250ML
20 PLASTIC BAG, INJECTION (ML) INTRAVENOUS ONCE
Status: DISCONTINUED | OUTPATIENT
Start: 2024-12-06 | End: 2024-12-06

## 2024-12-06 RX ORDER — CALCIUM CHLORIDE 100 MG/ML
INJECTION INTRAVENOUS; INTRAVENTRICULAR
Status: COMPLETED | OUTPATIENT
Start: 2024-12-06 | End: 2024-12-06

## 2024-12-06 RX ORDER — TRANEXAMIC ACID 100 MG/ML
500 INJECTION, SOLUTION INTRAVENOUS
Status: COMPLETED | OUTPATIENT
Start: 2024-12-06 | End: 2024-12-07

## 2024-12-06 RX ORDER — FENTANYL CITRATE 50 UG/ML
50 INJECTION, SOLUTION INTRAMUSCULAR; INTRAVENOUS
Status: DISCONTINUED | OUTPATIENT
Start: 2024-12-06 | End: 2024-12-08

## 2024-12-06 RX ORDER — HEPARIN SODIUM 1000 [USP'U]/ML
INJECTION, SOLUTION INTRAVENOUS; SUBCUTANEOUS AS NEEDED
Status: DISCONTINUED | OUTPATIENT
Start: 2024-12-06 | End: 2025-01-08 | Stop reason: HOSPADM

## 2024-12-06 RX ORDER — VANCOMYCIN/0.9 % SOD CHLORIDE 1.5G/250ML
1500 PLASTIC BAG, INJECTION (ML) INTRAVENOUS ONCE
Status: DISCONTINUED | OUTPATIENT
Start: 2024-12-06 | End: 2024-12-06

## 2024-12-06 RX ORDER — FENTANYL CITRATE 50 UG/ML
INJECTION, SOLUTION INTRAMUSCULAR; INTRAVENOUS
Status: COMPLETED
Start: 2024-12-06 | End: 2024-12-06

## 2024-12-06 RX ORDER — ROCURONIUM BROMIDE 10 MG/ML
50 INJECTION, SOLUTION INTRAVENOUS ONCE
Status: COMPLETED | OUTPATIENT
Start: 2024-12-06 | End: 2024-12-06

## 2024-12-06 RX ORDER — SODIUM CHLORIDE 9 MG/ML
INJECTION, SOLUTION INTRAVENOUS
Status: COMPLETED | OUTPATIENT
Start: 2024-12-06 | End: 2024-12-06

## 2024-12-06 RX ORDER — MINERAL OIL/HYDROPHIL PETROLAT
1 OINTMENT (GRAM) TOPICAL
Status: DISCONTINUED | OUTPATIENT
Start: 2024-12-07 | End: 2025-01-08 | Stop reason: HOSPADM

## 2024-12-06 RX ADMIN — SODIUM BICARBONATE 50 MEQ: 84 INJECTION INTRAVENOUS at 11:56

## 2024-12-06 RX ADMIN — EPINEPHRINE 1 MG: 0.1 INJECTION INTRAVENOUS at 11:50

## 2024-12-06 RX ADMIN — NOREPINEPHRINE BITARTRATE 0.16 MCG/KG/MIN: 0.03 INJECTION, SOLUTION INTRAVENOUS at 18:58

## 2024-12-06 RX ADMIN — ARFORMOTEROL TARTRATE 15 MCG: 15 SOLUTION RESPIRATORY (INHALATION) at 00:36

## 2024-12-06 RX ADMIN — CALCIUM CHLORIDE, MAGNESIUM CHLORIDE, SODIUM CHLORIDE, SODIUM BICARBONATE, POTASSIUM CHLORIDE AND SODIUM PHOSPHATE DIBASIC DIHYDRATE 1000 ML/HR: 3.68; 3.05; 6.34; 3.09; .314; .187 INJECTION INTRAVENOUS at 14:28

## 2024-12-06 RX ADMIN — Medication 10 ML: at 08:15

## 2024-12-06 RX ADMIN — PROPOFOL 5 MCG/KG/MIN: 10 INJECTION, EMULSION INTRAVENOUS at 12:37

## 2024-12-06 RX ADMIN — TRANEXAMIC ACID 500 MG: 100 INJECTION INTRAVENOUS at 23:50

## 2024-12-06 RX ADMIN — Medication 125 MCG/HR: at 19:05

## 2024-12-06 RX ADMIN — PIPERACILLIN AND TAZOBACTAM 4.5 G: 4; .5 INJECTION, POWDER, FOR SOLUTION INTRAVENOUS; PARENTERAL at 23:10

## 2024-12-06 RX ADMIN — CALCIUM CHLORIDE INJECTION 1 G: 100 INJECTION, SOLUTION INTRAVENOUS at 11:53

## 2024-12-06 RX ADMIN — PROPOFOL 40 MCG/KG/MIN: 10 INJECTION, EMULSION INTRAVENOUS at 22:33

## 2024-12-06 RX ADMIN — CALCIUM CHLORIDE, MAGNESIUM CHLORIDE, SODIUM CHLORIDE, SODIUM BICARBONATE, POTASSIUM CHLORIDE AND SODIUM PHOSPHATE DIBASIC DIHYDRATE 1000 ML/HR: 3.68; 3.05; 6.34; 3.09; .314; .187 INJECTION INTRAVENOUS at 19:08

## 2024-12-06 RX ADMIN — SODIUM BICARBONATE 50 MEQ: 84 INJECTION INTRAVENOUS at 11:52

## 2024-12-06 RX ADMIN — SODIUM CHLORIDE 1000 ML: 9 INJECTION, SOLUTION INTRAVENOUS at 11:52

## 2024-12-06 RX ADMIN — PIPERACILLIN AND TAZOBACTAM 4.5 G: 4; .5 INJECTION, POWDER, FOR SOLUTION INTRAVENOUS; PARENTERAL at 15:33

## 2024-12-06 RX ADMIN — Medication 50 MCG/HR: at 13:17

## 2024-12-06 RX ADMIN — SODIUM BICARBONATE 50 MEQ: 84 INJECTION INTRAVENOUS at 11:48

## 2024-12-06 RX ADMIN — CALCIUM CHLORIDE, MAGNESIUM CHLORIDE, SODIUM CHLORIDE, SODIUM BICARBONATE, POTASSIUM CHLORIDE AND SODIUM PHOSPHATE DIBASIC DIHYDRATE 1000 ML/HR: 3.68; 3.05; 6.34; 3.09; .314; .187 INJECTION INTRAVENOUS at 19:09

## 2024-12-06 RX ADMIN — FENTANYL CITRATE 25 MCG: 50 INJECTION, SOLUTION INTRAMUSCULAR; INTRAVENOUS at 12:21

## 2024-12-06 RX ADMIN — ROCURONIUM BROMIDE 50 MG: 50 INJECTION INTRAVENOUS at 13:27

## 2024-12-06 RX ADMIN — VASOPRESSIN IN 0.9% SODIUM CHLORIDE 0.03 UNITS/MIN: 20 INJECTION INTRAVENOUS at 15:42

## 2024-12-06 RX ADMIN — BUDESONIDE 0.5 MG: 0.5 INHALANT ORAL at 00:37

## 2024-12-06 RX ADMIN — NOREPINEPHRINE BITARTRATE 0.25 MCG/KG/MIN: 0.03 INJECTION, SOLUTION INTRAVENOUS at 12:47

## 2024-12-06 RX ADMIN — TRANEXAMIC ACID 500 MG: 100 INJECTION INTRAVENOUS at 19:13

## 2024-12-06 RX ADMIN — EPINEPHRINE 1 MG: 0.1 INJECTION INTRAVENOUS at 11:46

## 2024-12-06 RX ADMIN — ARFORMOTEROL TARTRATE 15 MCG: 15 SOLUTION RESPIRATORY (INHALATION) at 21:27

## 2024-12-06 RX ADMIN — TRANEXAMIC ACID 500 MG: 100 INJECTION INTRAVENOUS at 12:00

## 2024-12-06 RX ADMIN — Medication 10 ML: at 22:05

## 2024-12-06 RX ADMIN — MIDODRINE HYDROCHLORIDE 5 MG: 5 TABLET ORAL at 08:14

## 2024-12-06 RX ADMIN — BUDESONIDE 0.5 MG: 0.5 INHALANT ORAL at 21:27

## 2024-12-06 RX ADMIN — DEXTROSE MONOHYDRATE 25 G: 25 INJECTION, SOLUTION INTRAVENOUS at 17:00

## 2024-12-06 RX ADMIN — PROPOFOL 40 MCG/KG/MIN: 10 INJECTION, EMULSION INTRAVENOUS at 17:32

## 2024-12-06 NOTE — PROCEDURES
Procedure Note    Central Line Placement Procedure Note    Indication: Shock HD need    Consent obtained: yes.    Time Out: performed at bedside    Procedure: The patient was positioned appropriately and the skin over the right femoral vein was prepped with ChloraPrep and draped in sterile fashion.  Local anesthesia over the insertion site was applied with 1% lidocaine.  A large bore needle was then advanced with ultrasound guidance until there was return of dark blood. Guidewire was inserted and site confirmed with ultrasound again. Site was dilated with dilator, and triple lumen catheter was then inserted into the vessel over the guidewire using the Seldinger technique.  All three ports showed good, free flowing blood return and easily flushed with saline.  The catheter was then securely fastened to the skin with sutures and covered with a sterile dressing.  A post-procedure x-ray is pending at this time.    The patient tolerated the procedure well.    Complications: None.

## 2024-12-06 NOTE — PROGRESS NOTES
"Patient was on vanc for MRSE discitis/osteomyelitis of spine. Total duration is 6 weeks per ID physician from previous admissions. STOP DATE 24* Vanc consult entered by ICU provider for 7 days for empiric treatment. Will continue with current course    [facility] Clinical Pharmacy Services: Vancomycin Monitoring Note    Nelson Wang is a 79 y.o. male who is on day 36 of pharmacy to dose vancomycin for Bone and/or Joint Infection.    Previous Vancomycin Dose:   500 mg IV 4x weekly with dialysis   Imaging Reviewed?: Yes  Updated Cultures and Sensitivities:   10/31 - Blood - MRSE  10/31 - Blood - MRSE      Vitals/Labs  Ht: 170.2 cm (67\"); Wt: 70.7 kg (155 lb 13.8 oz)   Temp (24hrs), Av.7 °F (37.1 °C), Min:97.8 °F (36.6 °C), Max:100 °F (37.8 °C)   Estimated Creatinine Clearance: 13.3 mL/min (A) (by C-G formula based on SCr of 4.59 mg/dL (H)).   CRRT    Results from last 7 days   Lab Units 24  1409 24  1248 24  1238 24  0748 24  1635 24  1635 24  0000   VANCOMYCIN RM mcg/mL  --   --   --  27.00  --   --  21.4   CREATININE mg/dL 4.59* 4.46* 4.90* 4.53*   < > 3.59*  --    WBC 10*3/mm3  --   --  5.95 3.73  --  4.13  --     < > = values in this interval not displayed.     Assessment/Plan    Current Vancomycin Dose: No dose today  Patient received 500mg x1 yesterday with HD.   Now on CRRT, random level this AM was 27.   Will recheck random level in AM and reassess clearance and need for additional doses at that time.   Next Vanc Random ordered for  at 0600  We will continue to monitor patient changes and renal function     Thank you for involving pharmacy in this patient's care. Please contact pharmacy with any questions or concerns.    Merry Go [facility]  Clinical Pharmacist      "

## 2024-12-06 NOTE — CONSULTS
[facility]   Nephrology Consult Note      Patient Name: Nelson Wang  : 1945  MRN: [id number]  Primary Care Physician:  Domingo Bravo MD  Referring Physician: No Known Provider  Date of admission: 2024    Subjective   Subjective     Reason for Consult/ Chief Complaint: ESRD    HPI:  Nelson Wang is a 79 y.o. male with history of end-stage renal disease, on home hemodialysis, admitted through the ER yesterday with hypoxia and altered mentation.  Saw him earlier today.  He has some confusion, mildly restless.  Daughter at bedside.  O2 saturation was very low at home prior to admission in the 50 to 60% range.  Improved with O2 at 5 L.  Labs reviewed from yesterday.  CT scan of the chest showed pleural effusion and possible aspiration.  He was being treated for spine osteomyelitis/discitis with IV antibiotics.    Review of Systems   Review of systems could not be obtained due to   patient confusion.    Personal History     Past Medical History:   Diagnosis Date    Abnormal stress test     Anemia     IRON INFUSIONS WITH DIALYSIS    Anesthesia     WITH HIP REPLACEMENT DAUGHTER BELIEVES HAD SVT     Ankle pain, right     Anxiety and depression     Arthritis     CKD (chronic kidney disease), stage IV     DIALYSIS [id number] SHILEY IN RIGHT CHEST    Diabetes     GERD (gastroesophageal reflux disease)     Gout     High cholesterol     History of peritoneal dialysis     HL (hearing loss)     Hyperkalemia     Hypertension     Hypothyroidism     Insomnia     Night terrors     Psoriasis     Psoriasis     Sleep apnea     Sleep walking     Thrombocytopenia     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    Vitiligo        Past Surgical History:   Procedure Laterality Date    ABDOMINAL SURGERY      ANKLE SURGERY Right 1990    APPENDECTOMY N/A     ARTERIOVENOUS FISTULA/SHUNT SURGERY Left 2024    Procedure: Creation of left arm arteriovenous fistula;  Surgeon: Moses Mir MD;   Location: [facility] MAIN OR;  Service: Vascular;  Laterality: Left;    BRONCHOSCOPY N/A 03/01/2024    Procedure: BRONCHOSCOPY WITH BAL AND WASHINGS;  Surgeon: Sandra Espinal MD;  Location: [facility] ENDOSCOPY;  Service: Pulmonary;  Laterality: N/A;  PNEUMONIA    BRONCHOSCOPY N/A 08/30/2024    Procedure: BRONCHOSCOPY WITH BALS AND WASHINGS;  Surgeon: Sandra Espinal MD;  Location: [facility] ENDOSCOPY;  Service: Pulmonary;  Laterality: N/A;  MUCOUS PLUGGING    CARDIAC CATHETERIZATION N/A 05/29/2024    Procedure: Left Heart Cath with possible coronary angioplasty;  Surgeon: Stephan Nichols MD;  Location: [facility] CATH INVASIVE LOCATION;  Service: Cardiology;  Laterality: N/A;    COLONOSCOPY N/A 06/2022    [facility]    ENDOSCOPY N/A 10/28/2024    Procedure: ESOPHAGOGASTRODUODENOSCOPY INSERTION OF LIGHTED INSTRUMENT TO VIEW ESOPHAGUS, STOMACH AND SMALL INTESTINE;  Surgeon: Kingsley Peraza MD;  Location: [facility] ENDOSCOPY;  Service: Gastroenterology;  Laterality: N/A;  Gastritis    FRACTURE SURGERY      HIP BIPOLAR REPLACEMENT Right 01/2000    INSERTION HEMODIALYSIS CATHETER Left 04/09/2024    Procedure: HEMODIALYSIS CATHETER INSERTION;  Surgeon: Jose Berry MD;  Location: [facility] MAIN OR;  Service: General;  Laterality: Left;    INSERTION HEMODIALYSIS CATHETER N/A 04/12/2024    Procedure: Tunneled hemodialysis catheter insertion;  Surgeon: Enrique Vinson MD;  Location: [facility] MAIN OR;  Service: Vascular;  Laterality: N/A;    INSERTION PERITONEAL DIALYSIS CATHETER N/A 03/27/2023    Procedure: LAPAROSCOPIC INSERTION PERITONEAL DIALYSIS CATHETER, LAPAROSCOPIC OMENTOPEXY WITH LYSIS OF ADHESIONS;  Surgeon: Jose Berry MD;  Location: [facility] MAIN OR;  Service: General;  Laterality: N/A;    INSERTION PERITONEAL DIALYSIS CATHETER Left 07/23/2023    Procedure: REVISION OF PERITONEAL DIALYSIS CATHETER;  Surgeon: Radha Oreilly MD;  Location: [facility] MAIN OR;  Service: General;  Laterality: Left;     JOINT REPLACEMENT      REMOVAL PERITONEAL DIALYSIS CATHETER N/A 04/09/2024    Procedure: REMOVAL PERITONEAL DIALYSIS CATHETER;  Surgeon: Jose Berry MD;  Location: [facility] OR;  Service: General;  Laterality: N/A;    RENAL BIOPSY Left 07/15/2022    UPPER GASTROINTESTINAL ENDOSCOPY      VASCULAR SURGERY      WRIST SURGERY      UNSURE WHICH SIDE DAUGHTER REPORTS HAD SEVERE  CUT FROM WINDOW AND THEY HAD TO DO RECONSTRUCTIVE SURGERY WITH VESSELS AND NERVES       Family History: family history includes Cancer (age of onset: 35) in his sister; Colon cancer (age of onset: 77) in his sister; Diabetes in his brother, mother, and sister; Heart disease in his father and paternal uncle; Sleep apnea in his daughter and paternal aunt. Otherwise pertinent FHx was reviewed and not pertinent to current issue.    Social History:  reports that he quit smoking about 24 years ago. His smoking use included cigarettes. He started smoking about 34 years ago. He has a 10 pack-year smoking history. He has been exposed to tobacco smoke. He has never used smokeless tobacco. He reports that he does not currently use alcohol. He reports that he does not use drugs.    Home Medications:  Budeson-Glycopyrrol-Formoterol, Levothyroxine Sodium, Methoxy PEG-Epoetin Beta, QUEtiapine, Vancomycin HCl, atorvastatin, famotidine, midodrine, sertraline, sevelamer, and torsemide    Allergies:  No Known Allergies    Objective    Objective     Vitals:   Temp:  [97.8 °F (36.6 °C)-100 °F (37.8 °C)] 98.8 °F (37.1 °C)  Heart Rate:  [76-99] 76  Resp:  [20-22] 20  BP: (131-164)/(47-85) 140/47  Flow (L/min) (Oxygen Therapy):  [5-15] 5     Physical Exam    Constitutional: Awake, alert, has confusion and restlessness   Eyes: sclerae anicteric, no conjunctival injection   HENT: mucous membranes moist   Neck: Supple, no thyromegaly, no lymphadenopathy, trachea midline, minimal JVD   Respiratory: Decreased  to auscultation bilaterally, conversational  dyspnea, tachypnea noted.  Scattered bibasilar Rales     Cardiovascular: RRR, no murmurs, rubs, or gallops.   Gastrointestinal: soft, nontender, nondistended   Musculoskeletal: Trace  edema, no clubbing or cyanosis, patent left upper extremity AV fistula.   Psychiatric: With confusion.   Neurologic: Moving extremities.   Skin: warm and dry, no rashes     Result Review    Result Reviewed:  I have personally reviewed the results from the time of this admission to 12/6/2024 12:09 EST and agree with these findings:  [x]  Laboratory  []  Microbiology  [x]  Radiology  []  EKG/Telemetry   []  Cardiology/Vascular   []  Pathology  [x]  Old records  []  Other:  LAB RESULTS:    LAB RESULTS:        Lab 12/06/24  0748 12/05/24  1635   SODIUM 134* 137   POTASSIUM 4.1 3.9   CHLORIDE 95* 97*   CO2 28.3 30.6*   BUN 15 12   CREATININE 4.53* 3.59*   GLUCOSE 86 107*   EGFR 12.6* 16.6*   ANION GAP 10.7 9.4   MAGNESIUM 1.8  --            Most notable findings include: As above.  CT of the chest report reviewed.    Assessment & Plan   Assessment / Plan     Brief Patient Summary:  Nelson Wang is a 79 y.o. male who has end-stage renal disease, diabetes and hypertension and was being treated for vertebral osteo/discitis here with hypoxia and what appears to be volume overload.    Active Hospital Problems:  Active Hospital Problems    Diagnosis     **Hypoxia        Assessment and Plan:   - ESRD, dialysis today, was on home dialysis prior to admission.  UF 3 L as tolerated.  Add 1500 cc p.o. fluid restriction per day.  May need dialysis again tomorrow.  - Volume overload, may account for hypoxia but also concerned about aspiration pneumonia.  Has pleural effusion, being evaluated by pulmonary.  Continue Bumex and more ultrafiltration with dialysis as above.  - Type 2 diabetes with complications.  - History of hypertension, blood pressure has been acceptable.  - Anemia of chronic kidney disease, hemoglobin below goal.  On RONALD as outpatient.   Will give Epogen while here.  - Hypothyroidism, resume levothyroxine 175 mcg p.o. daily.  - Altered mentation, could be multifactorial, will discuss with primary.  Discussed with Dr. Jose Wang his daughter.  Will follow.    Thank you very much for this consult!    Electronically signed by Coreen Castro MD, 12/6/2024, 12:09 EST.    Addendum: Events noted.  CODE BLUE called.  Patient was in the process of getting thoracentesis, started with hemoptysis, had CPR x 2 round, pulse regained, patient was intubated transferred to ICU.  He is on small dose Levophed.  Discussed with ICU team, primary and family.  Will proceed with CRRT.  No fluid removal now until more hemodynamically stable.  Temporary dialysis catheter has been placed.  I went back to ICU and examined him.  His AV fistula is patent with good thrill and bruit.  He is tachycardic.  Orally intubated, moving extremities.  Prognosis is guarded.

## 2024-12-06 NOTE — PROCEDURES
Endotracheal Intubation note    Indication: Cardiac arrest, respiratory arrest    Consent obtained: No, emergent    Medications:  Premedication: None  Induction agent: None  Paralytics: None    Patient was getting active CPR. GlideScope was used to visualize epiglottis and vocal cords.  Significant blood clot and bleeding was seen around the vocal cord. Grade II view. Endotracheal intubation was done on first attempt with size 8 ET tube under direct GlideScope view. Patient tolerated the procedure well.   Continued with CPR after the intubation.    Complications: None.

## 2024-12-06 NOTE — PLAN OF CARE
Goal Outcome Evaluation:  Plan of Care Reviewed With: family        Progress: no change  Outcome Evaluation: Patient admitted to floor.  Patient is oriented to self only at this time and family has remained at bedside.  Patient still requiring 5L NC. Blood glucose monitored per order.  Patient takes his pills crushed in applesauce.  Family requested cardiac monitoring to be discontinued and also requests no continuous pulse ox or SCDs for patient as he has been very restless.  Family refused AM labs and requested that they be deferred until closer to breakfast.  I advised lab to come back around 0730.  Patient was very restless and did not fall asleep until 0330.  CT and dialysis are planned for today.  Verbal consent was given over the phone by daughter Jose Wang to proceed with thoracentesis this morning.  Call light is in family's reach and they are able to make needs known.  U/A is still needed.

## 2024-12-06 NOTE — SIGNIFICANT NOTE
Wound Eval / Progress Noted     Nate     Patient Name: Nelson Wang  : 1945  MRN: [id number]  Today's Date: 2024                 Admit Date: 2024    Visit Dx:    ICD-10-CM ICD-9-CM   1. Acute respiratory failure with hypoxia  J96.01 518.81   2. Acute pulmonary edema  J81.0 518.4         Hypoxia        Past Medical History:   Diagnosis Date    Abnormal stress test     Anemia     IRON INFUSIONS WITH DIALYSIS    Anesthesia     WITH HIP REPLACEMENT DAUGHTER BELIEVES HAD SVT     Ankle pain, right     Anxiety and depression     Arthritis     CKD (chronic kidney disease), stage IV     DIALYSIS [id number] SHILEY IN RIGHT CHEST    Diabetes     GERD (gastroesophageal reflux disease)     Gout     High cholesterol     History of peritoneal dialysis     HL (hearing loss)     Hyperkalemia     Hypertension     Hypothyroidism     Insomnia     Night terrors     Psoriasis     Psoriasis     Sleep apnea     Sleep walking     Thrombocytopenia     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    Vitiligo         Past Surgical History:   Procedure Laterality Date    ABDOMINAL SURGERY      ANKLE SURGERY Right 1990    APPENDECTOMY N/A     ARTERIOVENOUS FISTULA/SHUNT SURGERY Left 2024    Procedure: Creation of left arm arteriovenous fistula;  Surgeon: Moses Mir MD;  Location: [facility] MAIN OR;  Service: Vascular;  Laterality: Left;    BRONCHOSCOPY N/A 2024    Procedure: BRONCHOSCOPY WITH BAL AND WASHINGS;  Surgeon: Sandra Espinal MD;  Location: [facility] ENDOSCOPY;  Service: Pulmonary;  Laterality: N/A;  PNEUMONIA    BRONCHOSCOPY N/A 2024    Procedure: BRONCHOSCOPY WITH BALS AND WASHINGS;  Surgeon: Sandra Espinal MD;  Location: [facility] ENDOSCOPY;  Service: Pulmonary;  Laterality: N/A;  MUCOUS PLUGGING    CARDIAC CATHETERIZATION N/A 2024    Procedure: Left Heart Cath with possible coronary angioplasty;  Surgeon: Stephan Nichols MD;  Location: [facility] CATH INVASIVE LOCATION;   Service: Cardiology;  Laterality: N/A;    COLONOSCOPY N/A 06/2022    [facility]    ENDOSCOPY N/A 10/28/2024    Procedure: ESOPHAGOGASTRODUODENOSCOPY INSERTION OF LIGHTED INSTRUMENT TO VIEW ESOPHAGUS, STOMACH AND SMALL INTESTINE;  Surgeon: Kingsley Peraza MD;  Location: [facility] ENDOSCOPY;  Service: Gastroenterology;  Laterality: N/A;  Gastritis    FRACTURE SURGERY      HIP BIPOLAR REPLACEMENT Right 01/2000    INSERTION HEMODIALYSIS CATHETER Left 04/09/2024    Procedure: HEMODIALYSIS CATHETER INSERTION;  Surgeon: Jose Berry MD;  Location: [facility] OR;  Service: General;  Laterality: Left;    INSERTION HEMODIALYSIS CATHETER N/A 04/12/2024    Procedure: Tunneled hemodialysis catheter insertion;  Surgeon: Enrique Vinson MD;  Location: [facility] OR;  Service: Vascular;  Laterality: N/A;    INSERTION PERITONEAL DIALYSIS CATHETER N/A 03/27/2023    Procedure: LAPAROSCOPIC INSERTION PERITONEAL DIALYSIS CATHETER, LAPAROSCOPIC OMENTOPEXY WITH LYSIS OF ADHESIONS;  Surgeon: Jose Berry MD;  Location: [facility] OR;  Service: General;  Laterality: N/A;    INSERTION PERITONEAL DIALYSIS CATHETER Left 07/23/2023    Procedure: REVISION OF PERITONEAL DIALYSIS CATHETER;  Surgeon: Radha Oreilly MD;  Location: [facility] OR;  Service: General;  Laterality: Left;    JOINT REPLACEMENT      REMOVAL PERITONEAL DIALYSIS CATHETER N/A 04/09/2024    Procedure: REMOVAL PERITONEAL DIALYSIS CATHETER;  Surgeon: Jose Berry MD;  Location: [facility] OR;  Service: General;  Laterality: N/A;    RENAL BIOPSY Left 07/15/2022    UPPER GASTROINTESTINAL ENDOSCOPY      VASCULAR SURGERY      WRIST SURGERY      UNSURE WHICH SIDE DAUGHTER REPORTS HAD SEVERE  CUT FROM WINDOW AND THEY HAD TO DO RECONSTRUCTIVE SURGERY WITH VESSELS AND NERVES         Physical Assessment:  Wound 12/05/24 2331 Left anterior knee abrasion (Active)   Wound Image   12/06/24 1440   Dressing Appearance open to air 12/06/24  1440   Closure None 12/06/24 1440   Base dry;red 12/06/24 1440   Red (%), Wound Tissue Color 100 12/06/24 1440   Periwound dry;pink 12/06/24 1440   Periwound Temperature warm 12/06/24 1440   Periwound Skin Turgor soft 12/06/24 1440   Edges open 12/06/24 1440   Drainage Amount none 12/06/24 1440   Care, Wound cleansed with;sterile normal saline 12/06/24 1440   Dressing Care dressing applied;non-adherent;petroleum-based;gauze;silicone border foam 12/06/24 1440   Periwound Care dry periwound area maintained 12/06/24 1440       Wound 12/05/24 2331 Right lower leg abrasion (Active)   Wound Image   12/06/24 1440   Dressing Appearance open to air 12/06/24 1440   Closure Unable to assess 12/06/24 1440   Base dry;scab 12/06/24 1440   Periwound dry;pink 12/06/24 1440   Periwound Temperature warm 12/06/24 1440   Periwound Skin Turgor soft 12/06/24 1440   Edges rolled/closed 12/06/24 1440   Drainage Amount none 12/06/24 1440   Care, Wound cleansed with;sterile normal saline 12/06/24 1440   Dressing Care dressing applied;non-adherent;petroleum-based;gauze;silicone border foam 12/06/24 1440   Periwound Care dry periwound area maintained 12/06/24 1440       Wound 12/06/24 1440 Left lower leg skin tear (Active)   Wound Image   12/06/24 1440   Dressing Appearance open to air 12/06/24 1440   Closure None 12/06/24 1440   Base moist;red 12/06/24 1440   Red (%), Wound Tissue Color 100 12/06/24 1440   Periwound dry;pink;redness 12/06/24 1440   Periwound Temperature warm 12/06/24 1440   Periwound Skin Turgor soft 12/06/24 1440   Edges open 12/06/24 1440   Drainage Characteristics/Odor serosanguineous 12/06/24 1440   Drainage Amount scant 12/06/24 1440   Care, Wound cleansed with;sterile normal saline 12/06/24 1440   Dressing Care dressing applied;non-adherent;petroleum-based;gauze;silicone border foam 12/06/24 1440   Periwound Care absorptive dressing applied 12/06/24 1440      Wound Check / Follow-up:  Patient seen today for wound  consult. Visit originally attempted while patient was on 5STU; however, patient was undergoing a chest tube insertion. Patient is currently in ICU. Patient's daughter and primary RN deny any skin impairments other than skin tears and healing abrasion. Patient with history of impetigo and psoriasis per patient's daughter. Patient's daughter is agreeable to assessment. Patient is intubated and not verbally responding at this time.    Left knee presents with healing abrasion per patient's daughter's report. Wound base is dry with red tissue noted. Periwound tissue is dry with pink scar tissue.  Right lower leg presents with healing abrasion per patient's daughter's report. Wound base is covered with dry, crusted tan/brown tissue. Periwound tissue is dry with pink scar tissue.  Left lower leg presents with skin tear. Wound base is moist and red. Periwound tissue is dry with maroon tissue noted distally.  Cleansed all areas with normal saline and gauze, blotted dry.  Recommending every other day dressing changes with nonadherent petroleum-based gauze and silicone border dressing securement.    Patient's daughter reports applying Aquaphor for psoriasis.  Recommending quality skin care and hygiene with application of Aquaphor to all skin once a day.    Due to patient's limited mobility this time, recommending quality skin care and hygiene with application of blue top moisture barrier four times a day, and as needed for incontinence.  Implement every 2 hour turns, and offload at all times.  Keep patient clean, dry, and free from all moisture.    Impression: Left knee and right lower leg with healing abrasions.  Left lower leg skin tear.    Short term goals:  Regain skin integrity, skin protection, moisture prevention, pressure reduction, quality skin care and hygiene, every other day dressing changes.    Neena Corral RN    12/6/2024    18:02 EST

## 2024-12-06 NOTE — PROCEDURES
ACLS note:    I went to evaluate the patient for hemoptysis.  Patient was having agonal breathing.  No pulse was felt.      CODE BLUE was called overhead.    Started CPR.  I led ACLS.  Chest compression was continued every 2 minutes.  Was given epinephrine x 2 and bicarb pushes x 2.  Was given calcium chloride x 1  Patient had PEA arrest.  Endotracheal intubation was done during the code.  Had significant hemoptysis.  Blood clots around the vocal cord was removed.  Patient was bagged with Ambu bag through the ET tube.  With thoracentesis and chest tube attempt, consideration of pneumothorax was done.  Needle decompression was done on right second intercostal space in midclavicular line.  Subsequently chest tube was placed at bedside with 24 Mongolian chest tube.    Had return of spontaneous circulation after 3 rounds of CPR.  Central line was placed to start at bedside.    Stat chest x-ray was done which showed no pneumothorax.  ABG showed severe hypercapnia and respiratory acidosis.  Patient was placed on invasive mechanical ventilator and transferred to ICU for further care.  Primary service was notified.  Family was notified.    Likely cause of cardiac arrest was hypoxia with possible intraparenchymal bleeding.  Nebulized TXA will be given in ICU.

## 2024-12-06 NOTE — PROCEDURES
Chest Tube Placement Procedure Note     Indication: Possible pneumothorax with cardiac arrest     Consent obtained: No, emergent     Time Out: performed at bedside.     Pre-Medication: None     Procedure: The patient was getting active CPR.  Needle decompression was done on right second intercostal space in midclavicular line.  After return of spontaneous circulation, right  sixth intercostal space  incision was done and 24 Ghanaian chest tube was placed with the help of dilator.  Minimal air leak was seen.  A chest x-ray was obtained to evaluate placement which showed no pneumothorax.     Patient was getting active CPR during the procedure.     Complications: None

## 2024-12-06 NOTE — PROCEDURES
Chest tube procedure note     Indication: Loculated pleural effusion     Consent: Obtained     Timeout performed     Ultrasound was used to identify loculated right pleural effusion and was marked. The patient was prepped and draped in a sterile fashion over the area. The area was anesthetized with 15 mL 1% lidocaine without epinephrine. A finder needle was then inserted until there is evacuation of murky bloody fluid from the pleural space.  Using Seldinger technique, we attempted to place tube thoracostomy.  Unfortunately due to how loculated this pleural effusion was the guidewire kept kinking and bending.  Multiple attempts were made to place this chest tube using 3 different chest tube kits.  Despite this I still could not successfully the place this chest tube.  During the procedure the patient began coughing up blood and was having some hemoptysis.  I suspect this is due to needle puncture of the lung.  At this point, the decision was made to abort the procedure.     Blood loss: None  Complications: Pulmonary puncture with needle resulting in hemoptysis     Disposition: Critically ill    Electronically signed by Scottie Valentin MD, 12/06/24, 3:28 PM EST.

## 2024-12-06 NOTE — PROGRESS NOTES
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 Total duration is 6 weeks per ID physician from previous admissions. STOP DATE 24*     [facility] Clinical Pharmacy Services: Vancomycin Monitoring Note    Nelson Wang is a 79 y.o. male who is on day 36 of pharmacy to dose vancomycin for Bone and/or Joint Infection.    Previous Vancomycin Dose:   500 mg IV 4x weekly with dialysis   Imaging Reviewed?: Yes  Updated Cultures and Sensitivities:   10/31 - Blood - MRSE  10/31 - Blood - MRSE      Vitals/Labs  Ht: 170.2 cm (67\"); Wt: 75.3 kg (166 lb 0.1 oz)   Temp (24hrs), Av.7 °F (37.1 °C), Min:97.8 °F (36.6 °C), Max:100 °F (37.8 °C)   Estimated Creatinine Clearance: 14.3 mL/min (A) (by C-G formula based on SCr of 4.53 mg/dL (H)).   CRRT    Results from last 7 days   Lab Units 24  0748 24  1635 24  0000   VANCOMYCIN RM mcg/mL 27.00  --  21.4   CREATININE mg/dL 4.53* 3.59*  --    WBC 10*3/mm3 3.73 4.13  --      Assessment/Plan    Current Vancomycin Dose: No dose today  Patient received 500mg x1 yesterday with HD.   Now on CRRT, random level this AM was 27.   Will recheck random level in AM and reassess clearance and need for additional doses at that time.   Next Vanc Random ordered for  at 0600  We will continue to monitor patient changes and renal function     Thank you for involving pharmacy in this patient's care. Please contact pharmacy with any questions or concerns.    Meron Grady  Clinical Pharmacist    "

## 2024-12-06 NOTE — PROCEDURES
Arterial Line Placement Procedure Note     Indication: Shock   Hypotension    Consent obtained: emergent    Time Out: performed at y    Maverick's Test: not applicable    Procedure: The skin over the right femoral artery was prepped with ChloraPrep and draped. Local anesthesia was applied with 1% lidocaine. An arterial catheter insertion was attempted under ultrasound guidance with arterial Arrow kit. The procedure was successful in first attempt.  The catheter was securely fastened to the skin with suture.      The transducer set was attached to the catheter and waveforms on the monitor were observed and found to be normal. The site was then dressed in a sterile fashion.    The patient tolerated the procedure well.    Complications: None    Electronically signed by BECKY Albert, 12/06/24, 12:58 PM EST.

## 2024-12-06 NOTE — PROGRESS NOTES
[facility]     Progress Note    Patient Name: Nelson Wang  : 1945  MRN: [id number]  Primary Care Physician:  Domingo Bravo MD  Date of admission: 2024    Subjective   Subjective       Chief Complaint:   Patient is critically ill status post cardiac arrest with hemoptysis, hypoxic respiratory failure, likely hypoxic arrest, with acute hypoxic respiratory failure, acute on chronic congestive diastolic heart failure with EF of 50 to 60%, grade 1 diastolic dysfunction, acute cardiogenic pulmonary edema loculated right-sided pleural effusion, ESRD on hemodialysis, GASTON with home CPAP.    Critical drips: Levophed, vasopressin, fentanyl, propofol drips  Lines and tubes: ET tube, central line, dialysis catheter, arterial line    Over last 24 hours, patient was admitted with hypoxic respiratory failure.  Was started on nebulizers including Brovana, Pulmicort and DuoNeb.  Started on vancomycin.  Started on Bumex.  Remained on hyponasal oxygen.    Overnight, pain on hyponasal oxygen.    This morning,  Patient had thoracentesis and attempted chest tube insertion.  Started having hemoptysis and went apneic with agonal breathing.  Patient had hypoxic arrest and had cardiac arrest  CODE BLUE was called, CPR initiated and had return of spontaneous circulation after 3 rounds of CPR  During the code, patient had endotracheal intubation, needle decompression and right-sided chest tube insertion with concern of pneumothorax, central line placement.  Patient had return of spontaneous circulation and was transferred to ICU.  In ICU, patient had bronchoscopy with blood clots removal for increased peak airway pressure.      Review of Systems  Could not be obtained, patient not optimally responsive.      Objective   Objective     Vitals:   Temp:  [97.8 °F (36.6 °C)-100 °F (37.8 °C)] 98.8 °F (37.1 °C)  Heart Rate:  [76-99] 76  Resp:  [20-22] 20  BP: (131-164)/(47-85) 140/47  Flow (L/min) (Oxygen Therapy):  [5-15]  5  FiO2 (%):  [50 %-77 %] 50 %  Physical Exam   Vital Signs Reviewed  Critically ill, on invasive mechanical ventilator  HEENT:  PERRL. ET tube orally  Neck:  Supple, No JVD, no thyromegaly  Lymph: no axillary, cervical, supraclavicular lymphadenopathy noted bilaterally  Chest:  poor aeration, diminished breath sounds, resonant to percussion b/l, no increased work of breathing noted, right-sided chest tube in place, right-sided subcu emphysema+  CV: RRR, no MGR, pulses 2+, equal  Abd:  Soft, NT, ND, + BS, no HSM  EXT:  no clubbing, no cyanosis, No BLE edema, left arm AV fistula in place  Neuro: Not optimally responsive, on invasive mechanical ventilator  Skin: No rashes or lesions noted      Result Review    Result Review:  I have personally reviewed the results from the time of this admission to 12/6/2024 15:37 EST and agree with these findings:  [x]  Laboratory  [x]  Microbiology  [x]  Radiology  [x]  EKG/Telemetry   [x]  Cardiology/Vascular   []  Pathology  []  Old records  []  Other:  Most notable findings include:           Lab 12/06/24  1409 12/06/24  1248 12/06/24  1238 12/06/24  0748 12/05/24  1635   WBC  --   --  5.95 3.73 4.13   HEMOGLOBIN  --   --  6.9* 8.1* 8.4*   HEMATOCRIT  --   --  24.6* 28.2* 29.0*   PLATELETS  --   --  136* 141 132*   SODIUM  --   --  144 134* 137   POTASSIUM  --   --  3.6 4.1 3.9   CHLORIDE  --   --  101 95* 97*   CO2  --   --  36.5* 28.3 30.6*   BUN  --   --  16 15 12   CREATININE 4.59* 4.46* 4.90* 4.53* 3.59*   GLUCOSE  --   --  125* 86 107*   CALCIUM  --   --  10.1 9.1 9.4   PHOSPHORUS  --   --  5.7*  --   --    TOTAL PROTEIN  --   --   --  7.0 7.4   ALBUMIN  --   --  2.7* 3.0* 3.3*   GLOBULIN  --   --   --  4.0 4.1     XR Chest 1 View    Result Date: 12/6/2024  XR CHEST 1 VW Date of Exam: 12/6/2024 2:05 PM EST Indication: hypoxia Comparison: 12/6/2024 Findings: Interval retraction of the endotracheal tube which overlies the midthoracic trachea approximately 2.3 cm from the  mel. Right approach thoracostomy tube overlies the right midlung, mildly retracted from prior exam. Unchanged cardiomediastinal silhouette. There are persistent multifocal airspace opacities throughout the right lung. Persistent right greater than left pleural effusions. Trace right apical pneumothorax. Increased subcutaneous emphysema within the right lateral chest wall. There is a mildly displaced right lateral seventh rib fracture. There are also mildly displaced left posterior second and third rib fractures. These are new from prior same day radiographs.     Impression: Impression: 1.Interval retraction of the endotracheal tube which now appears in more appropriate position overlying the mid thoracic trachea. 2.Mildly displaced right lateral seventh rib fracture. There are also mildly displaced left posterior second and third rib fractures. These are new from prior same day radiographs. 3.Persistent multifocal airspace opacities throughout the right lung with right greater than left pleural effusions. 4.Mild interval retraction of the right approach thoracostomy tube. Trace right apical pneumothorax. Increased right lateral chest wall emphysema. Electronically Signed: Devante Lang MD  12/6/2024 2:26 PM EST  Workstation ID: [id number]    XR Chest 1 View    Result Date: 12/6/2024  XR CHEST 1 VW Date of Exam: 12/6/2024 12:06 PM EST Indication: hypoxa Comparison: Chest radiograph 12/5/2024, chest CT [id number] Findings: Proximal right mainstem intubation. Retraction by 3.5 cm may place the ET tube in a more standard position. Enlarged cardiomediastinal silhouette similar to prior. There is a right thoracotomy tube without appreciable pneumothorax. New right chest wall subcutaneous emphysema. There is a small to moderate size right pleural effusion similar to slightly improved from recent comparison. Suspect small layering left effusion again similar to may be slightly decreased allowing for technical differences.  Worsening airspace consolidation in the right middle and lower upper lobe. Persistent right greater than left bibasilar airspace opacities. Aortic atherosclerotic disease. Degenerative related osseous change. Defibrillator pads noted     Impression: Impression: 1. Proximal right mainstem intubation. Retraction by 3.5 cm may place the ET tube in a more standard position. 2. Right thoracotomy tube without visible pneumothorax. Small volume right chest wall subcutaneous emphysema. 3. Multifocal airspace disease and/or atelectasis worsened in the right middle and upper lobes. Based on acuity this could reflect aspiration, hemorrhage or edema. 4. Right greater than left pleural effusions similar to may be slightly improved allowing for technical differences to prior chest radiograph. Findings communicated with ordering provider Vamshi Villafana via [employer] secure chat, message viewed at 12:13 p.m. 12/6/2024. Electronically Signed: Lorne Bustamante MD  12/6/2024 12:23 PM EST  Workstation ID: [id number]    XR Chest 1 View    Result Date: 12/6/2024  XR CHEST 1 VW Date of Exam: 12/6/2024 10:15 AM EST Indication: Status post right thoracentesis Comparison: 12/6/2024 CT and 12/5/2024 radiographs Findings: Unchanged cardiomediastinal silhouette. Low lung volumes with similar bilateral airspace opacities. There is mild improved aeration of the right lung base with decreased right-sided pleural effusion status post thoracentesis. Unchanged left-sided pleural  effusion. No pneumothorax. No acute osseous abnormality.     Impression: Impression: 1.Decreased right-sided pleural effusion status post thoracentesis. No pneumothorax. 2.Similar bilateral airspace opacities and small left pleural effusion. Electronically Signed: Devante Lang MD  12/6/2024 11:30 AM EST  Workstation ID: [id number]    XR Chest 1 View    Result Date: 12/5/2024  XR CHEST 1 VW Date of Exam: 12/5/2024 4:29 PM EST Indication: SOA Triage Protocol Comparison: 12/1/2024  Findings: Cardiomediastinal silhouette is enlarged, unchanged. Persistent pulmonary vascular congestion with interstitial edema, basal airspace disease and small to moderate bilateral pleural effusions. No pneumothorax. No acute osseous abnormality identified.     Impression: Impression: Similar moderate  CHF features. Electronically Signed: Jose Hammond MD  12/5/2024 4:35 PM EST  Workstation ID: [id number]     Results for orders placed during the hospital encounter of 11/01/24    Adult Transthoracic Echo Complete W/ Cont if Necessary Per Protocol    Interpretation Summary    Left ventricular systolic function is normal. Left ventricular ejection fraction appears to be 56 - 60%.    Left ventricular diastolic function was indeterminate.    The right ventricular cavity is mildly dilated. Normal right ventricular systolic function noted.    : The left atrial cavity is mildly dilated.    The aortic valve leaflets are mildly to moderately calcified    There is moderate mitral annular calcification    Insufficient TR velocity profile to estimate the right ventricular systolic pressure.    There is no evidence of pericardial effusion        Assessment & Plan   Assessment / Plan     Brief Patient Summary:  Nelson Wang is a 79 y.o. male   Status post cardiac arrest  Hypoxic arrest  Hemoptysis  Concern for intraparenchymal bleed   Status post thoracentesis  Acute on chronic hypoxic respiratory failure  End-stage renal disease on dialysis leading to heart failure  Acute diastolic heart failure with EF of 56-60  Discitis on vancomycin  Type 2 diabetes  A-fib on apixaban    Active Hospital Problems:  Active Hospital Problems    Diagnosis     **Hypoxia      Plan:   Patient had ROSC after 3 rounds of CPR.  Intubated at bedside and placed on invasive mechanical ventilator.  Vent settings at AC VC 24/450/PEEP of 5/FiO2 keep saturation more than 90%.  Patient is starting to arouse.  Start fentanyl and propofol for pain and sedation  respectively.  Currently on Levophed drip.  Add vasopressin drip  Discussed with nephrology service.  Dialysis catheter placed.  Will consider CRRT given unstable hemodynamics.  Right-sided chest tube in place.  Continue to suction.  Continue with IV vancomycin.  Add Zosyn.  Left femoral central line was not entirely sterile.  We will consider removal of the line tomorrow if patient is stable otherwise.  Nebulized TXA was given.  Bronchoscopy done at bedside with large clots removal.  Hemoglobin is down to 6.9 g/dL.  We will transfuse 2 units packed red blood cells.  Continue with Brovana, Pulmicort and add DuoNeb.  Check bronchoscopy culture.  Check blood culture.  I had discussion with family regarding acute events.  Given his overall chronic condition, has poor prognosis.  His daughter and family decided him to be DNR.  We will monitor his hemodynamics closely.  DNR is very appropriate.      VTE Prophylaxis:  Pharmacologic & mechanical VTE prophylaxis orders are present.        CODE STATUS:   Level Of Support Discussed With: Next of Kin (If No Surrogate)  Code Status (Patient has no pulse and is not breathing): No CPR (Do Not Attempt to Resuscitate)  Medical Interventions (Patient has pulse or is breathing): Full Support      Patient is critically ill in ICU with status post cardiac arrest, hypoxic arrest, hemoptysis, large blood clots, likely intraparenchymal bleed, shock, ESRD.  I spent 106 minutes of critical care time excluding any procedure notes, spent in review, analysis, obtaining history and physical, formulating care plan, and I led multi-disciplinary critical care rounds with bedside nurse, respiratory therapist, clinical pharmacist and other allied services. I have discussed the case with primary service and other consultants as well.       Electronically signed by Preston Jimenez MD, 12/6/2024, 15:37 EST.

## 2024-12-06 NOTE — PROCEDURES
Bronchoscopy Procedure Note    Procedure:  Bronchoscopy with mucous plug removal, large blood clots removal  Bronchoscopy with bronchoalveolar lavage right lower lobe  Bronchoscopy with airway inspection    Pre-Operative Diagnosis: Hemoptysis, high peak pressure on vent  Post-Operative Diagnosis: Significant blood clot obstructing the airway    Indication:  Hemoptysis, high peak pressure on vent    Anesthesia: Moderate sedation    Procedure Details: Patient was on invasive mechanical ventilator with high peak pressure.  No consent was obtained as procedure was emergent.    The bronchoscope was inserted into the main airway via the ET tube. An anatomical survey was done of the main airways and the subsegmental bronchus. The findings are reported below.  Significant blood clot was seen in right mainstem and left mainstem bronchus, obstructing bilateral bronchial tubes.  It was suctioned out in multiple attempts.  Persistent clot was there in right mainstem and right lower lobe bronchus at the end of the procedure.  However, the peak pressure was improving.  A bronchoalveolar lavage was performed using 60 mL aliquots of normal saline x 1 instilled into the airways then aspirated back from right lower lobe of lung with 30 mL serosanguineous fluid in return.       Findings:  Significant blood clot was seen in right mainstem and left mainstem bronchus, obstructing bilateral bronchial tubes.  It was suctioned out in multiple attempts.    Persistent clot was there in right mainstem and right lower lobe bronchus at the end of the procedure.  However, the peak pressure was improving.  Friable mucosa, easy bleeding with suction  Scattered purulent secretions    Estimated Blood Loss: Insignificant    Specimens:  BAL right lower lobe    Complications: None; patient tolerated the procedure well.    Disposition: Remains critically ill, on invasive mechanical ventilator.    Patient tolerated the procedure well.      Electronically  signed by Preston Jimenez MD, 12/6/2024, 15:30 EST.

## 2024-12-06 NOTE — PROGRESS NOTES
[facility]   Hospitalist Progress Note  Date: 2024  Patient Name: Nelson Wang  : 1945  MRN: [id number]  Date of admission: 2024  Room/Bed: [facility]      Subjective   Subjective     Chief Complaint: SOB    Summary:Nelson Wang is a 79 y.o. 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  Patient was started on supplemental oxygen to maintain his oxygen saturations.  He was given a dose of Bumex overnight.  His chest x-ray was consistent with bilateral pleural effusions.  Pulmonology was consulted for the bilateral pleural effusions and and nephrology consulted for possible volume overload.  Pulmonology was performing a thoracentesis this morning.  Thoracentesis was showing bloody fluid.  Patient suffered CODE BLUE. Likely bleeding into his airways causing hypoxic arrest.  He received CPR for 6 minutes and achieved ROSC.  He was transferred to the ICU, intubated and on blood pressure support.  Bronchoscopy was showing blood clots in his airways.  Patient was started on CRRT.  Patient's medical status is tenuous.  He is currently on 2 pressors.      Interval Followup:   Patient was seen in the ICU.  Patient is heavily sedated, unable to follow any commands.  Patient is on CRRT.    Son-in-law was at bedside and updated    Unable to perform review of systems due to patient's status      Objective   Objective     Vitals:   Temp:  [97.8 °F (36.6 °C)-100 °F (37.8 °C)] 99 °F (37.2 °C)  Heart Rate:  [] 137  Resp:  [20-22] 20  BP: (123-164)/(47-85) 132/63  Arterial Line BP: ()/(49-61) 99/50  Flow (L/min) (Oxygen Therapy):  [5-15] 5  FiO2 (%):  [50 %-77 %] 50 %    Physical Exam   General: intubated and sedated  Cardiovascular: RRR, no murmurs   Pulmonary: Coarse breath sound, catechol breath sound  Gastrointestinal: Soft  nondistended  Musculoskeletal: Possible hematoma right upper chest  Neuro: Sedated  Access: R femoral vasc cath     Result Review    Result Review:  I have personally reviewed these results:  [x]  Laboratory      Lab 12/06/24  1507 12/06/24  1409 12/06/24  1248 12/06/24  1238 12/06/24  1206 12/06/24  0748 12/05/24  1755 12/05/24  1635   WBC  --   --   --  5.95  --  3.73  --  4.13   HEMOGLOBIN  --   --   --  6.9*  --  8.1*  --  8.4*   HEMATOCRIT  --   --   --  24.6*  --  28.2*  --  29.0*   PLATELETS  --   --   --  136*  --  141  --  132*   NEUTROS ABS  --   --   --  1.40*  --  1.66*  --  2.25   IMMATURE GRANS (ABS)  --   --   --  0.10*  --  0.01  --  0.01   LYMPHS ABS  --   --   --  3.88*  --  1.41  --  1.41   MONOS ABS  --   --   --  0.48  --  0.53  --  0.44   EOS ABS  --   --   --  0.07  --  0.09  --  0.00   MCV  --   --   --  96.5  --  94.9  --  93.9   PROCALCITONIN  --   --   --   --   --  0.27*  --   --    LACTATE 2.3* 1.8 1.6 3.9*   < >  --    < >  --    PROTIME  --   --   --  15.2*  --   --   --   --    APTT  --   --   --  31.1  --   --   --   --     < > = values in this interval not displayed.         Lab 12/06/24  1409 12/06/24  1248 12/06/24  1238 12/06/24  0748 12/05/24  1635   SODIUM  --   --  144 134* 137   POTASSIUM  --   --  3.6 4.1 3.9   CHLORIDE  --   --  101 95* 97*   CO2  --   --  36.5* 28.3 30.6*   ANION GAP  --   --  6.5 10.7 9.4   BUN  --   --  16 15 12   CREATININE 4.59* 4.46* 4.90* 4.53* 3.59*   EGFR  --   --  11.4* 12.6* 16.6*   GLUCOSE  --   --  125* 86 107*   CALCIUM  --   --  10.1 9.1 9.4   MAGNESIUM  --   --  1.9 1.8  --    PHOSPHORUS  --   --  5.7*  --   --          Lab 12/06/24  1238 12/06/24  0748 12/05/24  1635   TOTAL PROTEIN  --  7.0 7.4   ALBUMIN 2.7* 3.0* 3.3*   GLOBULIN  --  4.0 4.1   ALT (SGPT)  --  9 10   AST (SGOT)  --  28 27   BILIRUBIN  --  0.5 0.6   ALK PHOS  --  69 72         Lab 12/06/24  1238 12/06/24  0748 12/05/24  1854 12/05/24  1635   PROBNP  --   --   --  59,503.0*    HSTROP T  --  128* 113* 103*   PROTIME 15.2*  --   --   --    INR 1.17*  --   --   --              Lab 12/06/24  1507   ABO TYPING AB   RH TYPING Positive   ANTIBODY SCREEN Negative         Lab 12/06/24  1409 12/06/24  1248 12/06/24  1206   PH, ARTERIAL 7.343* 7.115* 7.170*   PCO2, ARTERIAL 59.7* 102.2* 106.6*   PO2 .7* 362.3* 306.1*   O2 SATURATION ART 97.8 99.9* 99.8*   FIO2 50 100 100   HCO3 ART 32.4* 32.8* 38.9*   BASE EXCESS ART 5.0* 1.0 8.6*     Brief Urine Lab Results  (Last result in the past 365 days)        Color   Clarity   Blood   Leuk Est   Nitrite   Protein   CREAT   Urine HCG        10/27/24 1417 Yellow   Cloudy   Negative   Trace   Negative   >=300 mg/dL (3+)                 [x]  Microbiology   Microbiology Results (last 10 days)       Procedure Component Value - Date/Time    AFB Culture - Body Fluid, Pleural Cavity [[id number]] Collected: 12/06/24 1233    Lab Status: Preliminary result Specimen: Body Fluid from Pleural Cavity Updated: 12/06/24 1508     AFB Stain Few (2+) WBCs seen      No organisms seen    Body Fluid Culture - Body Fluid, Pleural Cavity [[id number]] Collected: 12/06/24 1233    Lab Status: Preliminary result Specimen: Body Fluid from Pleural Cavity Updated: 12/06/24 1503     Gram Stain Few (2+) WBCs seen      No organisms seen    Blood Culture - Blood, Arm, Left [[id number]]  (Abnormal) Collected: 12/05/24 1755    Lab Status: Preliminary result Specimen: Blood from Arm, Left Updated: 12/06/24 1602     Blood Culture Abnormal Stain     Gram Stain Aerobic Bottle Gram positive cocci in clusters    Blood Culture - Blood, Arm, Left [[id number]]  (Abnormal) Collected: 12/05/24 1755    Lab Status: Preliminary result Specimen: Blood from Arm, Left Updated: 12/06/24 1459     Blood Culture Abnormal Stain     Gram Stain Aerobic Bottle Gram positive cocci in clusters          [x]  Radiology  XR Chest 1 View    Result Date: 12/6/2024  Impression: 1.Interval retraction of the endotracheal tube  which now appears in more appropriate position overlying the mid thoracic trachea. 2.Mildly displaced right lateral seventh rib fracture. There are also mildly displaced left posterior second and third rib fractures. These are new from prior same day radiographs. 3.Persistent multifocal airspace opacities throughout the right lung with right greater than left pleural effusions. 4.Mild interval retraction of the right approach thoracostomy tube. Trace right apical pneumothorax. Increased right lateral chest wall emphysema. Electronically Signed: Devante Lang MD  12/6/2024 2:26 PM EST  Workstation ID: [id number]    XR Chest 1 View    Result Date: 12/6/2024  Impression: 1. Proximal right mainstem intubation. Retraction by 3.5 cm may place the ET tube in a more standard position. 2. Right thoracotomy tube without visible pneumothorax. Small volume right chest wall subcutaneous emphysema. 3. Multifocal airspace disease and/or atelectasis worsened in the right middle and upper lobes. Based on acuity this could reflect aspiration, hemorrhage or edema. 4. Right greater than left pleural effusions similar to may be slightly improved allowing for technical differences to prior chest radiograph. Findings communicated with ordering provider Vamshi Villafana via [employer] secure chat, message viewed at 12:13 p.m. 12/6/2024. Electronically Signed: Lorne Bustamante MD  12/6/2024 12:23 PM EST  Workstation ID: [id number]    XR Chest 1 View    Result Date: 12/6/2024  Impression: 1.Decreased right-sided pleural effusion status post thoracentesis. No pneumothorax. 2.Similar bilateral airspace opacities and small left pleural effusion. Electronically Signed: Devante Lang MD  12/6/2024 11:30 AM EST  Workstation ID: [id number]    CT Chest Without Contrast Diagnostic    Result Date: 12/6/2024  Impression: 1.Moderate right and small left pleural effusions with bibasilar consolidations, which could represent atelectasis, aspiration, or pneumonia.  2.Interlobular septal thickening bilaterally, suggesting mild pulmonary edema. 3.Cardiomegaly. 4.Erosive changes at T5-6 appear similar, suggesting ongoing discitis/osteomyelitis. Electronically Signed: Winston Reyes MD  12/6/2024 8:44 AM EST  Workstation ID: [id number]    XR Chest 1 View    Result Date: 12/5/2024  Impression: Similar moderate  CHF features. Electronically Signed: Jose Hammond MD  12/5/2024 4:35 PM EST  Workstation ID: [id number]    CT Head Without Contrast    Result Date: 12/1/2024  Impression: 1. Small hematoma within the scalp overlying the right parietal bone at the vertex. No underlying skull fracture. 2. No acute intracranial abnormality. 3. Partial opacification of the right mastoid air cells unchanged from 11/15/2024 Electronically Signed: Stephan Dey MD  12/1/2024 6:27 PM EST  Workstation ID: [id number]    XR Chest 1 View    Result Date: 12/1/2024  Impression: 1. Low volume inspiration with bibasilar and right perihilar airspace disease which may be secondary to pulmonary edema or less likely pneumonia. 2. New small right pleural effusion. Electronically Signed: Stephan Dey MD  12/1/2024 5:33 PM EST  Workstation ID: [id number]   []  EKG/Telemetry   []  Cardiology/Vascular   []  Pathology  []  Old records  []  Other:    Assessment & Plan   Assessment / Plan     Assessment and Plan    Neuro:  #Intubated and sedated  Continue fentanyl drip  Continue propofol  Attempt to wean as tolerated    CV:  #Status postcardiac arrest  #Undifferentiated shock  Continue levophed  Continue vasopressor  Continue  MAP goal 65    #A-fib  Holding apixaban in setting of bleed    Resp:  #Hypoxic respiratory failure  #Hemoptysis  Vent settings tidal volume 400 respiratory rate 24 oxygen 50% PEEP 5  Continue ventilator support  Continue Brovana and Pulmicort  Pulmonology/ICU team following, appreciate recs    # Concern for empyema  Continue Zosyn    Renal:  #End-stage renal disease  Nephrology consulted appreciate  recs  Continue CRRT    GI:  No acute issue    Endo:  # Type 2 diabetes  Continue sliding scale insulin    #Blood loss anemia  Hgb goal >7  Transfuse 2 units  Follow up cbc    Psych:  #Bipolar disorder  Continue Seroquel    ID:  #Discitis/osteomyelitis  Pharmacy to dose vancomycin     Discussed with RN.    I spent 35 min of critical care time with this patient    VTE Prophylaxis:  Pharmacologic & mechanical VTE prophylaxis orders are present.        CODE STATUS:   Level Of Support Discussed With: Next of Kin (If No Surrogate)  Code Status (Patient has no pulse and is not breathing): No CPR (Do Not Attempt to Resuscitate)  Medical Interventions (Patient has pulse or is breathing): Full Support      Electronically signed by Wilfred Villafana MD, 12/6/2024, 16:06 EST.

## 2024-12-06 NOTE — CONSULTS
Pulmonary / Critical Care Consult Note      Patient Name: Nelson Wang  : 1945  MRN: [id number]  Primary Care Physician:  Domingo Bravo MD  Referring Physician: Wilfred Villafana MD  Date of admission: 2024    Subjective   Subjective     Reason for Consult/ Chief Complaint:   Acute hypoxic respiratory failure, bilateral pleural effusions    HPI:  Nelson Wang is a 79 y.o. male with past medical history of ESRD on HD presented to the hospital for evaluation of worsening shortness of breath.  In the ED, proBNP was 59,503, troponins 103.  Chest CT demonstrated moderate right and small left pleural effusions and pulmonary edema.  The service was consulted to assist in management treatment of patient's acute issues.  Upon assessment, patient lying in bed on 5 L nasal cannula no apparent respiratory distress.  Findings and plan were discussed with the patient and family numbers at bedside.  Family stating that patient desatted overnight prompting family to bring patient to the hospital.  Family agreeable at this time for thoracentesis.    Personal History     Past Medical History:   Diagnosis Date    Abnormal stress test     Anemia     IRON INFUSIONS WITH DIALYSIS    Anesthesia     WITH HIP REPLACEMENT DAUGHTER BELIEVES HAD SVT     Ankle pain, right     Anxiety and depression     Arthritis     CKD (chronic kidney disease), stage IV     DIALYSIS [id number] SHILEY IN RIGHT CHEST    Diabetes     GERD (gastroesophageal reflux disease)     Gout     High cholesterol     History of peritoneal dialysis     HL (hearing loss)     Hyperkalemia     Hypertension     Hypothyroidism     Insomnia     Night terrors     Psoriasis     Psoriasis     Sleep apnea     Sleep walking     Thrombocytopenia     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    Vitiligo        Past Surgical History:   Procedure Laterality Date    ABDOMINAL SURGERY      ANKLE SURGERY Right 1990    APPENDECTOMY N/A     ARTERIOVENOUS  FISTULA/SHUNT SURGERY Left 07/16/2024    Procedure: Creation of left arm arteriovenous fistula;  Surgeon: Moses Mir MD;  Location: [facility] MAIN OR;  Service: Vascular;  Laterality: Left;    BRONCHOSCOPY N/A 03/01/2024    Procedure: BRONCHOSCOPY WITH BAL AND WASHINGS;  Surgeon: Sandra Espinal MD;  Location: [facility] ENDOSCOPY;  Service: Pulmonary;  Laterality: N/A;  PNEUMONIA    BRONCHOSCOPY N/A 08/30/2024    Procedure: BRONCHOSCOPY WITH BALS AND WASHINGS;  Surgeon: Sandra Espinal MD;  Location: [facility] ENDOSCOPY;  Service: Pulmonary;  Laterality: N/A;  MUCOUS PLUGGING    CARDIAC CATHETERIZATION N/A 05/29/2024    Procedure: Left Heart Cath with possible coronary angioplasty;  Surgeon: Stephan Nichols MD;  Location: [facility] CATH INVASIVE LOCATION;  Service: Cardiology;  Laterality: N/A;    COLONOSCOPY N/A 06/2022    [facility]    ENDOSCOPY N/A 10/28/2024    Procedure: ESOPHAGOGASTRODUODENOSCOPY INSERTION OF LIGHTED INSTRUMENT TO VIEW ESOPHAGUS, STOMACH AND SMALL INTESTINE;  Surgeon: Kingsley Peraza MD;  Location: [facility] ENDOSCOPY;  Service: Gastroenterology;  Laterality: N/A;  Gastritis    FRACTURE SURGERY      HIP BIPOLAR REPLACEMENT Right 01/2000    INSERTION HEMODIALYSIS CATHETER Left 04/09/2024    Procedure: HEMODIALYSIS CATHETER INSERTION;  Surgeon: Jose Berry MD;  Location: [facility] MAIN OR;  Service: General;  Laterality: Left;    INSERTION HEMODIALYSIS CATHETER N/A 04/12/2024    Procedure: Tunneled hemodialysis catheter insertion;  Surgeon: Enrique Vinson MD;  Location: [facility] MAIN OR;  Service: Vascular;  Laterality: N/A;    INSERTION PERITONEAL DIALYSIS CATHETER N/A 03/27/2023    Procedure: LAPAROSCOPIC INSERTION PERITONEAL DIALYSIS CATHETER, LAPAROSCOPIC OMENTOPEXY WITH LYSIS OF ADHESIONS;  Surgeon: Jose Berry MD;  Location: [facility] MAIN OR;  Service: General;  Laterality: N/A;    INSERTION PERITONEAL DIALYSIS CATHETER Left 07/23/2023    Procedure: REVISION OF  PERITONEAL DIALYSIS CATHETER;  Surgeon: Radha Oreilly MD;  Location: [facility] MAIN OR;  Service: General;  Laterality: Left;    JOINT REPLACEMENT      REMOVAL PERITONEAL DIALYSIS CATHETER N/A 04/09/2024    Procedure: REMOVAL PERITONEAL DIALYSIS CATHETER;  Surgeon: Jose Berry MD;  Location: [facility] MAIN OR;  Service: General;  Laterality: N/A;    RENAL BIOPSY Left 07/15/2022    UPPER GASTROINTESTINAL ENDOSCOPY      VASCULAR SURGERY      WRIST SURGERY      UNSURE WHICH SIDE DAUGHTER REPORTS HAD SEVERE  CUT FROM WINDOW AND THEY HAD TO DO RECONSTRUCTIVE SURGERY WITH VESSELS AND NERVES       Family History: family history includes Cancer (age of onset: 35) in his sister; Colon cancer (age of onset: 77) in his sister; Diabetes in his brother, mother, and sister; Heart disease in his father and paternal uncle; Sleep apnea in his daughter and paternal aunt. Otherwise pertinent FHx was reviewed and not pertinent to current issue.    Social History:  reports that he quit smoking about 24 years ago. His smoking use included cigarettes. He started smoking about 34 years ago. He has a 10 pack-year smoking history. He has been exposed to tobacco smoke. He has never used smokeless tobacco. He reports that he does not currently use alcohol. He reports that he does not use drugs.    Home Medications:  Budeson-Glycopyrrol-Formoterol, Levothyroxine Sodium, Methoxy PEG-Epoetin Beta, QUEtiapine, Vancomycin HCl, atorvastatin, famotidine, midodrine, sertraline, sevelamer, and torsemide    Allergies:  No Known Allergies    Objective    Objective     Vitals:   Temp:  [97.8 °F (36.6 °C)-100 °F (37.8 °C)] 98.5 °F (36.9 °C)  Heart Rate:  [85-99] 94  Resp:  [20] 20  BP: (131-164)/(56-85) 142/56  Flow (L/min) (Oxygen Therapy):  [5-15] 5    Physical Exam:  Vital Signs Reviewed   General:  WDWN, Alert, NAD.  Chronically ill-appearing male, lying in bed  HEENT:  PERRL, EOMI.  OP, nares clear, no sinus tenderness  Neck:  Supple, no  JVD, no thyromegaly  Lymph: no axillary, cervical, supraclavicular lymphadenopathy noted bilaterally  Chest: Decreased aeration with diminished breath sounds bilaterally and Velcro-like crackles right greater than left  CV: RRR, no MGR, pulses 2+, equal.  Abd:  Soft, NT, ND, + BS, no HSM  EXT:  no clubbing, no cyanosis, no edema, no joint tenderness  Neuro:  A&Ox3, CN grossly intact, no focal deficits.  Skin: No rashes or lesions noted      Result Review    Result Review:  I have personally reviewed the results from the time of this admission to 12/6/2024 06:24 EST and agree with these findings:  [x]  Laboratory  [x]  Microbiology  [x]  Radiology  []  EKG/Telemetry   []  Cardiology/Vascular   []  Pathology  []  Old records  []  Other:  Most notable findings include:         Lab 12/06/24  1409 12/06/24  1248 12/06/24  1238 12/06/24  0748 12/05/24  1635   WBC  --   --  5.95 3.73 4.13   HEMOGLOBIN  --   --  6.9* 8.1* 8.4*   HEMATOCRIT  --   --  24.6* 28.2* 29.0*   PLATELETS  --   --  136* 141 132*   SODIUM  --   --  144 134* 137   POTASSIUM  --   --  3.6 4.1 3.9   CHLORIDE  --   --  101 95* 97*   CO2  --   --  36.5* 28.3 30.6*   BUN  --   --  16 15 12   CREATININE 4.59* 4.46* 4.90* 4.53* 3.59*   GLUCOSE  --   --  125* 86 107*   CALCIUM  --   --  10.1 9.1 9.4   PHOSPHORUS  --   --  5.7*  --   --    TOTAL PROTEIN  --   --   --  7.0 7.4   ALBUMIN  --   --  2.7* 3.0* 3.3*   GLOBULIN  --   --   --  4.0 4.1     CT Chest Without Contrast Diagnostic    Result Date: 12/6/2024  CT CHEST WO CONTRAST DIAGNOSTIC Date of Exam: 12/6/2024 7:31 AM EST Indication: hear failure and pleural effusion. Comparison: Chest radiograph from earlier today and CT chest from November 12, 2024 Technique: Axial CT images were obtained of the chest without contrast administration.  Reconstructed coronal and sagittal images were also obtained. Automated exposure control and iterative construction methods were used. Findings: The central  tracheobronchial tree is clear. There are moderate right and small left pleural effusions with bibasilar consolidations. There is some interlobular septal thickening bilaterally. The heart is enlarged, with evidence of calcified coronary artery disease. The great vessels are normal in caliber. No abnormally enlarged lymph nodes are identified. Partial evaluation of the upper abdomen is unremarkable. Erosive changes at T5-6 appear similar, suggesting ongoing discitis/osteomyelitis.     Impression: Impression: 1.Moderate right and small left pleural effusions with bibasilar consolidations, which could represent atelectasis, aspiration, or pneumonia. 2.Interlobular septal thickening bilaterally, suggesting mild pulmonary edema. 3.Cardiomegaly. 4.Erosive changes at T5-6 appear similar, suggesting ongoing discitis/osteomyelitis. Electronically Signed: Winston Reyes MD  12/6/2024 8:44 AM EST  Workstation ID: [id number]     Cultures so far negative    Assessment & Plan   Assessment / Plan     Active Hospital Problems:  Active Hospital Problems    Diagnosis     **Hypoxia      Impression:  Acute hypoxic respiratory failure requiring 15 L nonrebreather  Acute on chronic congestive diastolic heart failure EF 50 to 60% with grade 1 DD  Acute cardiogenic pulmonary edema loculated right pleural effusion    NSTEMI likely type II from demand ischemia  Elevated proBNP 59,503  ESRD on HD  GASTON with home CPAP  Tobacco abuse of cigarettes, in remission     Plan:  -Continue to wean supplemental oxygen to maintain SpO2 greater than 90%  -Chest CT demonstrated moderate right and small left pleural effusions and pulmonary edema.  Reviewed multiple chest CTs in the right pleural effusion is enlarging and loculated.  I do question if it is an infected effusion related to his Staph epidermidis bacteremia he had last month  -Ultrasound utilized at bedside to evaluate right pleural effusion.  Discussed proceding forward with thoracentesis.  Discussed the risks of the procedure with the patient including pneumothorax, hemothorax, bleeding, hypoxia and death. The patient recognizes these findings, acknowledges these findings and is agreeable to the procedure. Obtained consent for right sided thoracentesis 12/6/2024  -100 cc of cloudy fluid removed, collected, and sent to lab for cultures  -Upon review of the last 3 chest CTs, appears fluid was loculated and continues to enlarge.  Discussed moving forward with chest tube and patient family agreeable.  Procedure was very complicated due to dense complicated pleural effusion.  Postprocedure, the patient had hemoptysis and was given TXA neb.  Unfortunately respiratory status deteriorated and and patient coded  -Patient transferred to ICU.  Please see procedure note for further details    VTE Prophylaxis:  Mechanical VTE prophylaxis orders are present.    Code Status and Medical Interventions: CPR (Attempt to Resuscitate); Full Support   Ordered at: 12/05/24 1844     Level Of Support Discussed With:    Patient     Code Status (Patient has no pulse and is not breathing):    CPR (Attempt to Resuscitate)     Medical Interventions (Patient has pulse or is breathing):    Full Support        Labs, imaging, notes and medications personally reviewed.  Discussed with primary    I, Dr. Scottie Valentin, have spent more than 50% of the total time managing the patient in this encounter today.  This included personally reviewing all pertinent labs, imaging, microbiology and documentation. Also discussing the case with the patient and any available family, the admitting physician and any available ancillary staff.      Thank you for involving me in the care of this patient.    Electronically signed by BECKY Rosenthal, 12/06/24, 2:14 PM EST.  Electronically signed by Scottie Valentin MD, 12/06/24, 3:23 PM EST.     no

## 2024-12-06 NOTE — PROCEDURES
Procedure Note    Central Line Placement Procedure Note    Indication: Cardiac arrest  Emergent procedure, timeout performed, consent Y      Procedure: The patient was positioned appropriately and the skin over the left femoral vein was prepped with ChloraPrep and draped in at best sterile fashion.  A large bore needle was then advanced with ultrasound guidance until there was return of dark blood. Guidewire was inserted and site confirmed with ultrasound again. Site was dilated with dilator, and triple lumen catheter was then inserted into the vessel over the guidewire using the Seldinger technique.  All three ports showed good, free flowing blood return and easily flushed with saline.  The catheter was then securely fastened to the skin with sutures and covered with a sterile dressing.      The patient tolerated the procedure well.    Complications: None.    Procedure was emergent, sterile fashion was broken at times, will plan to discontinue central line in the next 24 hours and replace if continue needed for IV access.    Left femoral.

## 2024-12-06 NOTE — PROCEDURES
Procedure name: ultrasound-guided right-sided thoracentesis     Indication - pleural effusion     This procedural risks were explained to the patient including pneumothorax requiring chest tube placement, significant bleeding requiring surgery, and infection , understanding of the risks and benefits acknowledged by the patient and family and he wish to proceed with the procedure.     Timeout performed     Procedure details  Ultrasound was use to visualize a  collection of pleural fluid, the diaphragm, and collapsed lung. At this point, the skin overlying the rib was anesthetized with 5 mL 1% lidocaine without epinephrine. A thoracentesis needle was then inserted into the pleural cavity just over top of the rib and pleural fluid was aspirated back. At this time the thoracentesis catheter was advanced to the pleural cavity over the needle.  Approximately 100 mL of bloody murky fluid was removed. Pleural fluid was sent for analysis. Chest x-ray has been ordered showing decreased size of effusion.      Patient tolerated procedure well     No immediate complications    Electronically signed by Scottie Valentin MD, 12/06/24, 3:26 PM EST.

## 2024-12-06 NOTE — CONSULTS
12/06/24 1244   Coping/Psychosocial   Additional Documentation Spiritual Care (Group)   Spiritual Care   Use of Spiritual Resources spirituality for coping, indicated strong use of  (the family's roman tradition is Adventism.)   Spiritual Care Source nurse referral   Spiritual Care Follow-Up will follow closely   Response to Spiritual Care receptive of support;emotion expressed   Spiritual Care Interventions other (see comments)  (provided support for fmaily during code event)   Spiritual Care Visit Type other (see comments)  (CODE BLUE)   Spiritual Care Request family support      Patient's daughter and wife have had the opportunity to whisper their final goodbyes in his ear. The family is waiting for the patient's daughter from out of state to arrive before possibly transitioning him to CMO.      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     Family wants to continue aggressive treatment at this time but NO CPR if needed.     - Chaplain Rev. Anastasiia Mdiv., [facility].

## 2024-12-06 NOTE — PROCEDURES
Bedside thoracic ultrasound:     Right side: Liver, right hemidiaphragm, right lower lobe of lung identified.  Small loculated right pleural effusion identified.  Images saved     Site marked for thoracentesis.        CPT 71639 with thoracentesis note    Electronically signed by Scottie Valentin MD, 12/06/24, 3:25 PM EST.

## 2024-12-07 ENCOUNTER — APPOINTMENT (OUTPATIENT)
Dept: GENERAL RADIOLOGY | Facility: HOSPITAL | Age: 79
End: 2024-12-07
Payer: MEDICARE

## 2024-12-07 LAB
ALBUMIN SERPL-MCNC: 2.8 G/DL (ref 3.5–5.2)
ALBUMIN SERPL-MCNC: 3 G/DL (ref 3.5–5.2)
ALBUMIN/GLOB SERPL: 0.8 G/DL
ALP SERPL-CCNC: 63 U/L (ref 39–117)
ALT SERPL W P-5'-P-CCNC: 47 U/L (ref 1–41)
ANION GAP SERPL CALCULATED.3IONS-SCNC: 10.9 MMOL/L (ref 5–15)
ANION GAP SERPL CALCULATED.3IONS-SCNC: 10.9 MMOL/L (ref 5–15)
ANION GAP SERPL CALCULATED.3IONS-SCNC: 11.8 MMOL/L (ref 5–15)
ANION GAP SERPL CALCULATED.3IONS-SCNC: 12.3 MMOL/L (ref 5–15)
ANION GAP SERPL CALCULATED.3IONS-SCNC: 12.9 MMOL/L (ref 5–15)
ARTERIAL PATENCY WRIST A: ABNORMAL
AST SERPL-CCNC: 138 U/L (ref 1–40)
ATMOSPHERIC PRESS: 753.5 MMHG
BACTERIA BLD CULT: ABNORMAL
BACTERIA BLD CULT: ABNORMAL
BACTERIA ID TEST ISLT QL CULT: ABNORMAL
BACTERIA ID TEST ISLT QL CULT: ABNORMAL
BASE EXCESS BLDA CALC-SCNC: -5.3 MMOL/L (ref -2–2)
BASOPHILS # BLD AUTO: 0.02 10*3/MM3 (ref 0–0.2)
BASOPHILS NFR BLD AUTO: 0.3 % (ref 0–1.5)
BDY SITE: ABNORMAL
BILIRUB SERPL-MCNC: 1.2 MG/DL (ref 0–1.2)
BOTTLE TYPE: ABNORMAL
BOTTLE TYPE: ABNORMAL
BUN SERPL-MCNC: 10 MG/DL (ref 8–23)
BUN SERPL-MCNC: 9 MG/DL (ref 8–23)
BUN/CREAT SERPL: 4.7 (ref 7–25)
BUN/CREAT SERPL: 5 (ref 7–25)
BUN/CREAT SERPL: 5 (ref 7–25)
BUN/CREAT SERPL: 6.2 (ref 7–25)
BUN/CREAT SERPL: 6.6 (ref 7–25)
CA-I BLDA-SCNC: 1.08 MMOL/L (ref 1.13–1.32)
CA-I BLDA-SCNC: 1.11 MMOL/L (ref 1.13–1.32)
CALCIUM SPEC-SCNC: 8.2 MG/DL (ref 8.6–10.5)
CALCIUM SPEC-SCNC: 8.3 MG/DL (ref 8.6–10.5)
CALCIUM SPEC-SCNC: 8.6 MG/DL (ref 8.6–10.5)
CHLORIDE BLDA-SCNC: 111 MMOL/L (ref 98–107)
CHLORIDE SERPL-SCNC: 102 MMOL/L (ref 98–107)
CHLORIDE SERPL-SCNC: 103 MMOL/L (ref 98–107)
CHLORIDE SERPL-SCNC: 104 MMOL/L (ref 98–107)
CO2 SERPL-SCNC: 21.1 MMOL/L (ref 22–29)
CO2 SERPL-SCNC: 21.7 MMOL/L (ref 22–29)
CO2 SERPL-SCNC: 22.1 MMOL/L (ref 22–29)
CO2 SERPL-SCNC: 22.1 MMOL/L (ref 22–29)
CO2 SERPL-SCNC: 24.2 MMOL/L (ref 22–29)
CREAT SERPL-MCNC: 1.37 MG/DL (ref 0.76–1.27)
CREAT SERPL-MCNC: 1.45 MG/DL (ref 0.76–1.27)
CREAT SERPL-MCNC: 1.81 MG/DL (ref 0.76–1.27)
CREAT SERPL-MCNC: 1.81 MG/DL (ref 0.76–1.27)
CREAT SERPL-MCNC: 2.13 MG/DL (ref 0.76–1.27)
D-LACTATE SERPL-SCNC: 1.3 MMOL/L
D-LACTATE SERPL-SCNC: 1.8 MMOL/L (ref 0.5–2)
DEPRECATED RDW RBC AUTO: 64.6 FL (ref 37–54)
EGFRCR SERPLBLD CKD-EPI 2021: 31.1 ML/MIN/1.73
EGFRCR SERPLBLD CKD-EPI 2021: 37.8 ML/MIN/1.73
EGFRCR SERPLBLD CKD-EPI 2021: 37.8 ML/MIN/1.73
EGFRCR SERPLBLD CKD-EPI 2021: 49.3 ML/MIN/1.73
EGFRCR SERPLBLD CKD-EPI 2021: 52.8 ML/MIN/1.73
EOSINOPHIL # BLD AUTO: 0.01 10*3/MM3 (ref 0–0.4)
EOSINOPHIL NFR BLD AUTO: 0.2 % (ref 0.3–6.2)
ERYTHROCYTE [DISTWIDTH] IN BLOOD BY AUTOMATED COUNT: 20.1 % (ref 12.3–15.4)
GLOBULIN UR ELPH-MCNC: 3.6 GM/DL
GLUCOSE BLDC GLUCOMTR-MCNC: 129 MG/DL (ref 70–99)
GLUCOSE BLDC GLUCOMTR-MCNC: 136 MG/DL (ref 70–99)
GLUCOSE BLDC GLUCOMTR-MCNC: 157 MG/DL (ref 70–99)
GLUCOSE BLDC GLUCOMTR-MCNC: 60 MG/DL (ref 70–99)
GLUCOSE BLDC GLUCOMTR-MCNC: 70 MG/DL (ref 70–99)
GLUCOSE BLDC GLUCOMTR-MCNC: 77 MG/DL (ref 70–99)
GLUCOSE BLDC GLUCOMTR-MCNC: 81 MG/DL (ref 70–99)
GLUCOSE BLDC GLUCOMTR-MCNC: 94 MG/DL (ref 70–99)
GLUCOSE SERPL-MCNC: 75 MG/DL (ref 65–99)
GLUCOSE SERPL-MCNC: 75 MG/DL (ref 65–99)
GLUCOSE SERPL-MCNC: 80 MG/DL (ref 65–99)
GLUCOSE SERPL-MCNC: 83 MG/DL (ref 65–99)
GLUCOSE SERPL-MCNC: 98 MG/DL (ref 65–99)
HCO3 BLDA-SCNC: 19.2 MMOL/L (ref 22–26)
HCT VFR BLD AUTO: 30.5 % (ref 37.5–51)
HCT VFR BLD AUTO: 30.8 % (ref 37.5–51)
HCT VFR BLD CALC: 26 % (ref 38–51)
HEMODILUTION: NO
HGB BLD-MCNC: 9.3 G/DL (ref 13–17.7)
HGB BLD-MCNC: 9.3 G/DL (ref 13–17.7)
HGB BLDA-MCNC: 8.8 G/DL (ref 12–18)
IMM GRANULOCYTES # BLD AUTO: 0.04 10*3/MM3 (ref 0–0.05)
IMM GRANULOCYTES NFR BLD AUTO: 0.6 % (ref 0–0.5)
INHALED O2 CONCENTRATION: 40 %
LYMPHOCYTES # BLD AUTO: 0.86 10*3/MM3 (ref 0.7–3.1)
LYMPHOCYTES NFR BLD AUTO: 13.7 % (ref 19.6–45.3)
MAGNESIUM SERPL-MCNC: 2.1 MG/DL (ref 1.6–2.4)
MCH RBC QN AUTO: 27 PG (ref 26.6–33)
MCHC RBC AUTO-ENTMCNC: 30.2 G/DL (ref 31.5–35.7)
MCV RBC AUTO: 89.3 FL (ref 79–97)
MODALITY: ABNORMAL
MONOCYTES # BLD AUTO: 0.58 10*3/MM3 (ref 0.1–0.9)
MONOCYTES NFR BLD AUTO: 9.2 % (ref 5–12)
NEUTROPHILS NFR BLD AUTO: 4.77 10*3/MM3 (ref 1.7–7)
NEUTROPHILS NFR BLD AUTO: 76 % (ref 42.7–76)
NRBC BLD AUTO-RTO: 0 /100 WBC (ref 0–0.2)
PCO2 BLDA: 32.8 MM HG (ref 35–45)
PEEP RESPIRATORY: 5 CM[H2O]
PH BLDA: 7.38 PH UNITS (ref 7.35–7.45)
PHOSPHATE SERPL-MCNC: 2.7 MG/DL (ref 2.5–4.5)
PHOSPHATE SERPL-MCNC: 3.5 MG/DL (ref 2.5–4.5)
PHOSPHATE SERPL-MCNC: 3.8 MG/DL (ref 2.5–4.5)
PHOSPHATE SERPL-MCNC: 4.6 MG/DL (ref 2.5–4.5)
PLATELET # BLD AUTO: 109 10*3/MM3 (ref 140–450)
PMV BLD AUTO: 9.9 FL (ref 6–12)
PO2 BLD: 228 MM[HG] (ref 0–500)
PO2 BLDA: 91 MM HG (ref 80–100)
POTASSIUM BLDA-SCNC: 3.9 MMOL/L (ref 3.5–5)
POTASSIUM SERPL-SCNC: 4.2 MMOL/L (ref 3.5–5.2)
POTASSIUM SERPL-SCNC: 4.4 MMOL/L (ref 3.5–5.2)
POTASSIUM SERPL-SCNC: 4.4 MMOL/L (ref 3.5–5.2)
POTASSIUM SERPL-SCNC: 4.5 MMOL/L (ref 3.5–5.2)
POTASSIUM SERPL-SCNC: 4.6 MMOL/L (ref 3.5–5.2)
PROT SERPL-MCNC: 6.4 G/DL (ref 6–8.5)
RBC # BLD AUTO: 3.45 10*6/MM3 (ref 4.14–5.8)
RESPIRATORY RATE: 24
SAO2 % BLDCOA: 97 % (ref 95–99)
SODIUM BLD-SCNC: 141 MMOL/L (ref 131–143)
SODIUM SERPL-SCNC: 136 MMOL/L (ref 136–145)
SODIUM SERPL-SCNC: 137 MMOL/L (ref 136–145)
SODIUM SERPL-SCNC: 140 MMOL/L (ref 136–145)
VANCOMYCIN SERPL-MCNC: 14.16 MCG/ML (ref 5–40)
VENTILATOR MODE: AC
VT ON VENT VENT: 400 ML
WBC NRBC COR # BLD AUTO: 6.28 10*3/MM3 (ref 3.4–10.8)

## 2024-12-07 PROCEDURE — 83735 ASSAY OF MAGNESIUM: CPT | Performed by: INTERNAL MEDICINE

## 2024-12-07 PROCEDURE — 80202 ASSAY OF VANCOMYCIN: CPT | Performed by: STUDENT IN AN ORGANIZED HEALTH CARE EDUCATION/TRAINING PROGRAM

## 2024-12-07 PROCEDURE — 80051 ELECTROLYTE PANEL: CPT

## 2024-12-07 PROCEDURE — 80053 COMPREHEN METABOLIC PANEL: CPT | Performed by: INTERNAL MEDICINE

## 2024-12-07 PROCEDURE — 99291 CRITICAL CARE FIRST HOUR: CPT | Performed by: INTERNAL MEDICINE

## 2024-12-07 PROCEDURE — 94761 N-INVAS EAR/PLS OXIMETRY MLT: CPT

## 2024-12-07 PROCEDURE — 74018 RADEX ABDOMEN 1 VIEW: CPT

## 2024-12-07 PROCEDURE — 25010000002 VANCOMYCIN 5 G RECONSTITUTED SOLUTION: Performed by: INTERNAL MEDICINE

## 2024-12-07 PROCEDURE — 25010000002 PIPERACILLIN SOD-TAZOBACTAM PER 1 G: Performed by: NURSE PRACTITIONER

## 2024-12-07 PROCEDURE — 85025 COMPLETE CBC W/AUTO DIFF WBC: CPT | Performed by: INTERNAL MEDICINE

## 2024-12-07 PROCEDURE — 83605 ASSAY OF LACTIC ACID: CPT | Performed by: INTERNAL MEDICINE

## 2024-12-07 PROCEDURE — 82948 REAGENT STRIP/BLOOD GLUCOSE: CPT | Performed by: NURSE PRACTITIONER

## 2024-12-07 PROCEDURE — 25010000003 VASOPRESSIN 0.2 UNITS/ML SOLUTION: Performed by: INTERNAL MEDICINE

## 2024-12-07 PROCEDURE — 83605 ASSAY OF LACTIC ACID: CPT

## 2024-12-07 PROCEDURE — 94799 UNLISTED PULMONARY SVC/PX: CPT

## 2024-12-07 PROCEDURE — 71045 X-RAY EXAM CHEST 1 VIEW: CPT

## 2024-12-07 PROCEDURE — 82948 REAGENT STRIP/BLOOD GLUCOSE: CPT

## 2024-12-07 PROCEDURE — 87449 NOS EACH ORGANISM AG IA: CPT | Performed by: INTERNAL MEDICINE

## 2024-12-07 PROCEDURE — 85018 HEMOGLOBIN: CPT | Performed by: STUDENT IN AN ORGANIZED HEALTH CARE EDUCATION/TRAINING PROGRAM

## 2024-12-07 PROCEDURE — 84100 ASSAY OF PHOSPHORUS: CPT | Performed by: INTERNAL MEDICINE

## 2024-12-07 PROCEDURE — 87040 BLOOD CULTURE FOR BACTERIA: CPT | Performed by: NURSE PRACTITIONER

## 2024-12-07 PROCEDURE — 99232 SBSQ HOSP IP/OBS MODERATE 35: CPT | Performed by: STUDENT IN AN ORGANIZED HEALTH CARE EDUCATION/TRAINING PROGRAM

## 2024-12-07 PROCEDURE — 94003 VENT MGMT INPAT SUBQ DAY: CPT

## 2024-12-07 PROCEDURE — 25010000002 MICAFUNGIN SODIUM 100 MG RECONSTITUTED SOLUTION 1 EACH VIAL: Performed by: INTERNAL MEDICINE

## 2024-12-07 PROCEDURE — 94664 DEMO&/EVAL PT USE INHALER: CPT

## 2024-12-07 PROCEDURE — 25810000003 SODIUM CHLORIDE 0.9 % SOLUTION: Performed by: INTERNAL MEDICINE

## 2024-12-07 PROCEDURE — 25010000002 PROPOFOL 10 MG/ML EMULSION: Performed by: NURSE PRACTITIONER

## 2024-12-07 PROCEDURE — 85014 HEMATOCRIT: CPT | Performed by: STUDENT IN AN ORGANIZED HEALTH CARE EDUCATION/TRAINING PROGRAM

## 2024-12-07 PROCEDURE — 82803 BLOOD GASES ANY COMBINATION: CPT

## 2024-12-07 PROCEDURE — 80202 ASSAY OF VANCOMYCIN: CPT | Performed by: INTERNAL MEDICINE

## 2024-12-07 PROCEDURE — 82948 REAGENT STRIP/BLOOD GLUCOSE: CPT | Performed by: INTERNAL MEDICINE

## 2024-12-07 PROCEDURE — 82330 ASSAY OF CALCIUM: CPT

## 2024-12-07 RX ORDER — MIDODRINE HYDROCHLORIDE 5 MG/1
5 TABLET ORAL
Status: DISCONTINUED | OUTPATIENT
Start: 2024-12-09 | End: 2024-12-11

## 2024-12-07 RX ORDER — QUETIAPINE FUMARATE 25 MG/1
25 TABLET, FILM COATED ORAL NIGHTLY
Status: DISCONTINUED | OUTPATIENT
Start: 2024-12-08 | End: 2024-12-15

## 2024-12-07 RX ADMIN — DEXTROSE MONOHYDRATE 25 G: 25 INJECTION, SOLUTION INTRAVENOUS at 19:34

## 2024-12-07 RX ADMIN — VASOPRESSIN IN 0.9% SODIUM CHLORIDE 0.03 UNITS/MIN: 20 INJECTION INTRAVENOUS at 00:13

## 2024-12-07 RX ADMIN — CALCIUM CHLORIDE, MAGNESIUM CHLORIDE, SODIUM CHLORIDE, SODIUM BICARBONATE, POTASSIUM CHLORIDE AND SODIUM PHOSPHATE DIBASIC DIHYDRATE 750 ML/HR: 3.68; 3.05; 6.34; 3.09; .314; .187 INJECTION INTRAVENOUS at 16:24

## 2024-12-07 RX ADMIN — CALCIUM CHLORIDE, MAGNESIUM CHLORIDE, SODIUM CHLORIDE, SODIUM BICARBONATE, POTASSIUM CHLORIDE AND SODIUM PHOSPHATE DIBASIC DIHYDRATE 750 ML/HR: 3.68; 3.05; 6.34; 3.09; .314; .187 INJECTION INTRAVENOUS at 16:23

## 2024-12-07 RX ADMIN — PIPERACILLIN AND TAZOBACTAM 4.5 G: 4; .5 INJECTION, POWDER, FOR SOLUTION INTRAVENOUS; PARENTERAL at 23:23

## 2024-12-07 RX ADMIN — BUDESONIDE 0.5 MG: 0.5 INHALANT ORAL at 09:53

## 2024-12-07 RX ADMIN — VASOPRESSIN IN 0.9% SODIUM CHLORIDE 0.03 UNITS/MIN: 20 INJECTION INTRAVENOUS at 19:41

## 2024-12-07 RX ADMIN — ARFORMOTEROL TARTRATE 15 MCG: 15 SOLUTION RESPIRATORY (INHALATION) at 09:53

## 2024-12-07 RX ADMIN — VASOPRESSIN IN 0.9% SODIUM CHLORIDE 0.03 UNITS/MIN: 20 INJECTION INTRAVENOUS at 08:53

## 2024-12-07 RX ADMIN — TRANEXAMIC ACID 500 MG: 100 INJECTION INTRAVENOUS at 07:38

## 2024-12-07 RX ADMIN — CALCIUM CHLORIDE, MAGNESIUM CHLORIDE, SODIUM CHLORIDE, SODIUM BICARBONATE, POTASSIUM CHLORIDE AND SODIUM PHOSPHATE DIBASIC DIHYDRATE 750 ML/HR: 3.68; 3.05; 6.34; 3.09; .314; .187 INJECTION INTRAVENOUS at 22:07

## 2024-12-07 RX ADMIN — MICAFUNGIN SODIUM 100 MG: 100 INJECTION, POWDER, LYOPHILIZED, FOR SOLUTION INTRAVENOUS at 11:39

## 2024-12-07 RX ADMIN — CALCIUM CHLORIDE, MAGNESIUM CHLORIDE, SODIUM CHLORIDE, SODIUM BICARBONATE, POTASSIUM CHLORIDE AND SODIUM PHOSPHATE DIBASIC DIHYDRATE 1000 ML/HR: 3.68; 3.05; 6.34; 3.09; .314; .187 INJECTION INTRAVENOUS at 05:07

## 2024-12-07 RX ADMIN — DEXTROSE MONOHYDRATE 25 G: 25 INJECTION, SOLUTION INTRAVENOUS at 08:21

## 2024-12-07 RX ADMIN — NOREPINEPHRINE BITARTRATE 0.08 MCG/KG/MIN: 0.03 INJECTION, SOLUTION INTRAVENOUS at 23:06

## 2024-12-07 RX ADMIN — ARFORMOTEROL TARTRATE 15 MCG: 15 SOLUTION RESPIRATORY (INHALATION) at 20:30

## 2024-12-07 RX ADMIN — PIPERACILLIN AND TAZOBACTAM 4.5 G: 4; .5 INJECTION, POWDER, FOR SOLUTION INTRAVENOUS; PARENTERAL at 16:23

## 2024-12-07 RX ADMIN — WHITE PETROLATUM 1 APPLICATION: 1.75 OINTMENT TOPICAL at 11:15

## 2024-12-07 RX ADMIN — Medication 10 ML: at 09:07

## 2024-12-07 RX ADMIN — QUETIAPINE FUMARATE 25 MG: 25 TABLET ORAL at 21:10

## 2024-12-07 RX ADMIN — Medication 10 ML: at 20:51

## 2024-12-07 RX ADMIN — VANCOMYCIN HYDROCHLORIDE 1250 MG: 5 INJECTION, POWDER, LYOPHILIZED, FOR SOLUTION INTRAVENOUS at 13:37

## 2024-12-07 RX ADMIN — Medication 125 MCG/HR: at 01:25

## 2024-12-07 RX ADMIN — CALCIUM CHLORIDE, MAGNESIUM CHLORIDE, SODIUM CHLORIDE, SODIUM BICARBONATE, POTASSIUM CHLORIDE AND SODIUM PHOSPHATE DIBASIC DIHYDRATE 1000 ML/HR: 3.68; 3.05; 6.34; 3.09; .314; .187 INJECTION INTRAVENOUS at 00:16

## 2024-12-07 RX ADMIN — PROPOFOL 40 MCG/KG/MIN: 10 INJECTION, EMULSION INTRAVENOUS at 03:12

## 2024-12-07 RX ADMIN — PROPOFOL 40 MCG/KG/MIN: 10 INJECTION, EMULSION INTRAVENOUS at 08:53

## 2024-12-07 RX ADMIN — BUDESONIDE 0.5 MG: 0.5 INHALANT ORAL at 20:30

## 2024-12-07 RX ADMIN — PIPERACILLIN AND TAZOBACTAM 4.5 G: 4; .5 INJECTION, POWDER, FOR SOLUTION INTRAVENOUS; PARENTERAL at 08:22

## 2024-12-07 RX ADMIN — CALCIUM CHLORIDE, MAGNESIUM CHLORIDE, SODIUM CHLORIDE, SODIUM BICARBONATE, POTASSIUM CHLORIDE AND SODIUM PHOSPHATE DIBASIC DIHYDRATE 750 ML/HR: 3.68; 3.05; 6.34; 3.09; .314; .187 INJECTION INTRAVENOUS at 22:08

## 2024-12-07 NOTE — PLAN OF CARE
Goal Outcome Evaluation:      Patient continued CRRT throughout the night with no problems. 2nd unit of blood was completed. Levo was able to be titrated down but was still needed with the vaso. Fent and prop gtts continued, patient opens eyes during turns but not following commands. JOANIE MARADIAGA

## 2024-12-07 NOTE — PLAN OF CARE
Goal Outcome Evaluation:  Plan of Care Reviewed With: patient        Progress: no change  Outcome Evaluation: Patient is intubated and on ventilator with settings  rate 24, peep 5 and fio2 .50. Bloody secretions when suctioning have decreased.

## 2024-12-07 NOTE — PLAN OF CARE
Goal Outcome Evaluation:      Patient is off sedation since 1300 today. SBT attempted this earlier this shift but did not continue PS due to apnea and decreased tidal volumes. Switched back to A/C. Will re-attempt SBT now that patient is more awake.

## 2024-12-07 NOTE — PROGRESS NOTES
[facility]     Progress Note    Patient Name: Nelson Wang  : 1945  MRN: [id number]  Primary Care Physician:  Domingo Bravo MD  Date of admission: 2024    Subjective   Subjective       Chief Complaint:   Patient is critically ill status post cardiac arrest with hemoptysis, hypoxic respiratory failure, likely hypoxic arrest, with acute hypoxic respiratory failure, acute on chronic congestive diastolic heart failure with EF of 50 to 60%, grade 1 diastolic dysfunction, acute cardiogenic pulmonary edema loculated right-sided pleural effusion, ESRD on hemodialysis, GASTON with home CPAP.    Critical drips: Levophed, vasopressin, fentanyl, propofol drips  Lines and tubes: ET tube, central line, dialysis catheter, arterial line    Over last 24 hours, patient had cardiac arrest after thoracentesis and attempted chest tube placement on the right chest.  Had significant hemoptysis with likely intraparenchymal bleed in the lung.  Had bronchoscopy x 2 on vent.  Started on CRRT after dialysis cath was placed.  Central line was placed as well.  Started nebulized TXA x 3 doses.  Bloody secretions in ET tube slowed down.    Overnight, patient remained on pressors and on vent.    This morning,  Patient is on invasive mechanical ventilator with vent settings of AC VC 20/400/PEEP of 5/FiO2 at 50%.  He is on CRRT.  Levophed drip at 0.03 mg/kg/min.  Vasopressin at 0.03 units.  Propofol at 40 and fentanyl at 125 mcg/kg/min.  No fever noted.  Tmax was 99.1 °F.  Blood culture came back positive for Candida glabrata and strep and Staph aureus.  He is not optimally responsive on invasive mechanical ventilator.  Right-sided chest tube in place.  Chest tube without air leak.    Review of Systems  Could not be obtained, patient not optimally responsive.      Objective   Objective     Vitals:   Temp:  [96.7 °F (35.9 °C)-99.1 °F (37.3 °C)] 98.7 °F (37.1 °C)  Heart Rate:  [] 61  Resp:  [20-24] 24  BP: (102-165)/(47-67)  102/49  Arterial Line BP: ()/(38-69) 115/44  Flow (L/min) (Oxygen Therapy):  [5] 5  FiO2 (%):  [50 %-77 %] 51 %  Physical Exam   Vital Signs Reviewed  Critically ill, on invasive mechanical ventilator  HEENT:  PERRL. ET tube orally  Neck:  Supple, No JVD, no thyromegaly  Lymph: no axillary, cervical, supraclavicular lymphadenopathy noted bilaterally  Chest:  poor aeration, diminished breath sounds, resonant to percussion b/l, no increased work of breathing noted, right-sided chest tube in place, right-sided subcu emphysema+  CV: RRR, no MGR, pulses 2+, equal  Abd:  Soft, NT, ND, + BS, no HSM  EXT:  no clubbing, no cyanosis, No BLE edema, left arm AV fistula in place  Neuro: Not optimally responsive, on invasive mechanical ventilator  Skin: No rashes or lesions noted      Result Review    Result Review:  I have personally reviewed the results from the time of this admission to 12/7/2024 06:17 EST and agree with these findings:  [x]  Laboratory  [x]  Microbiology  [x]  Radiology  [x]  EKG/Telemetry   [x]  Cardiology/Vascular   []  Pathology  []  Old records  []  Other:  Most notable findings include:           Lab 12/07/24  0245 12/07/24  0007 12/06/24  1409 12/06/24  1248 12/06/24  1238 12/06/24  0748 12/05/24  1635   WBC  --   --   --   --  5.95 3.73 4.13   HEMOGLOBIN 9.3*  --   --   --  6.9* 8.1* 8.4*   HEMATOCRIT 30.5*  --   --   --  24.6* 28.2* 29.0*   PLATELETS  --   --   --   --  136* 141 132*   SODIUM  --  140  --   --  144 134* 137   POTASSIUM  --  4.2  --   --  3.6 4.1 3.9   CHLORIDE  --  104  --   --  101 95* 97*   CO2  --  24.2  --   --  36.5* 28.3 30.6*   BUN  --  10  --   --  16 15 12   CREATININE  --  2.13* 4.59* 4.46* 4.90* 4.53* 3.59*   GLUCOSE  --  80  --   --  125* 86 107*   CALCIUM  --  8.2*  --   --  10.1 9.1 9.4   PHOSPHORUS  --  2.7  --   --  5.7*  --   --    TOTAL PROTEIN  --   --   --   --   --  7.0 7.4   ALBUMIN  --  2.8*  --   --  2.7* 3.0* 3.3*   GLOBULIN  --   --   --   --   --  4.0  4.1     XR Chest 1 View    Result Date: 12/6/2024  XR CHEST 1 VW Date of Exam: 12/6/2024 3:57 PM EST Indication: Desaturation Comparison: Chest radiograph dated 12/6/2024 Findings: The endotracheal tube tip is 3 cm above the mel. The cardiomediastinal silhouette is within normal limits. There is aortic arch atherosclerotic calcification. There is a right-sided chest tube in slightly different position. There is increased subcutaneous emphysema tracking throughout both sides of the chest, right worse than left. The subcutaneous emphysema limits assessment for pneumothorax. There is multifocal right-sided airspace disease which appears mildly improved from the prior examination. There is no definite left-sided airspace disease. There are degenerative changes of the thoracic spine.     Impression: Impression: 1.Endotracheal tube tip is 3 cm above the mel. 2.Slight change in position of the right-sided chest tube with increased subcutaneous emphysema tracking throughout both sides of the chest, right worse than left. Evaluation for pneumothorax is limited due to the subcutaneous emphysema. 3.Decreased right-sided airspace disease compared to prior exam. Electronically Signed: Reynaldo Rangel  12/6/2024 4:35 PM EST  Workstation ID: [id number]    XR Chest 1 View    Result Date: 12/6/2024  XR CHEST 1 VW Date of Exam: 12/6/2024 2:05 PM EST Indication: hypoxia Comparison: 12/6/2024 Findings: Interval retraction of the endotracheal tube which overlies the midthoracic trachea approximately 2.3 cm from the mel. Right approach thoracostomy tube overlies the right midlung, mildly retracted from prior exam. Unchanged cardiomediastinal silhouette. There are persistent multifocal airspace opacities throughout the right lung. Persistent right greater than left pleural effusions. Trace right apical pneumothorax. Increased subcutaneous emphysema within the right lateral chest wall. There is a mildly displaced right lateral  seventh rib fracture. There are also mildly displaced left posterior second and third rib fractures. These are new from prior same day radiographs.     Impression: Impression: 1.Interval retraction of the endotracheal tube which now appears in more appropriate position overlying the mid thoracic trachea. 2.Mildly displaced right lateral seventh rib fracture. There are also mildly displaced left posterior second and third rib fractures. These are new from prior same day radiographs. 3.Persistent multifocal airspace opacities throughout the right lung with right greater than left pleural effusions. 4.Mild interval retraction of the right approach thoracostomy tube. Trace right apical pneumothorax. Increased right lateral chest wall emphysema. Electronically Signed: Devante Lang MD  12/6/2024 2:26 PM EST  Workstation ID: [id number]    XR Chest 1 View    Result Date: 12/6/2024  XR CHEST 1 VW Date of Exam: 12/6/2024 12:06 PM EST Indication: hypoxa Comparison: Chest radiograph 12/5/2024, chest CT [id number] Findings: Proximal right mainstem intubation. Retraction by 3.5 cm may place the ET tube in a more standard position. Enlarged cardiomediastinal silhouette similar to prior. There is a right thoracotomy tube without appreciable pneumothorax. New right chest wall subcutaneous emphysema. There is a small to moderate size right pleural effusion similar to slightly improved from recent comparison. Suspect small layering left effusion again similar to may be slightly decreased allowing for technical differences. Worsening airspace consolidation in the right middle and lower upper lobe. Persistent right greater than left bibasilar airspace opacities. Aortic atherosclerotic disease. Degenerative related osseous change. Defibrillator pads noted     Impression: Impression: 1. Proximal right mainstem intubation. Retraction by 3.5 cm may place the ET tube in a more standard position. 2. Right thoracotomy tube without visible  pneumothorax. Small volume right chest wall subcutaneous emphysema. 3. Multifocal airspace disease and/or atelectasis worsened in the right middle and upper lobes. Based on acuity this could reflect aspiration, hemorrhage or edema. 4. Right greater than left pleural effusions similar to may be slightly improved allowing for technical differences to prior chest radiograph. Findings communicated with ordering provider Vamshi Villafana via [employer] secure chat, message viewed at 12:13 p.m. 12/6/2024. Electronically Signed: Lorne Bustamante MD  12/6/2024 12:23 PM EST  Workstation ID: [id number]    XR Chest 1 View    Result Date: 12/6/2024  XR CHEST 1 VW Date of Exam: 12/6/2024 10:15 AM EST Indication: Status post right thoracentesis Comparison: 12/6/2024 CT and 12/5/2024 radiographs Findings: Unchanged cardiomediastinal silhouette. Low lung volumes with similar bilateral airspace opacities. There is mild improved aeration of the right lung base with decreased right-sided pleural effusion status post thoracentesis. Unchanged left-sided pleural  effusion. No pneumothorax. No acute osseous abnormality.     Impression: Impression: 1.Decreased right-sided pleural effusion status post thoracentesis. No pneumothorax. 2.Similar bilateral airspace opacities and small left pleural effusion. Electronically Signed: Devante Lang MD  12/6/2024 11:30 AM EST  Workstation ID: [id number]    XR Chest 1 View    Result Date: 12/5/2024  XR CHEST 1 VW Date of Exam: 12/5/2024 4:29 PM EST Indication: SOA Triage Protocol Comparison: 12/1/2024 Findings: Cardiomediastinal silhouette is enlarged, unchanged. Persistent pulmonary vascular congestion with interstitial edema, basal airspace disease and small to moderate bilateral pleural effusions. No pneumothorax. No acute osseous abnormality identified.     Impression: Impression: Similar moderate  CHF features. Electronically Signed: Jose Hammond MD  12/5/2024 4:35 PM EST  Workstation ID: [id number]      Results for orders placed during the hospital encounter of 11/01/24    Adult Transthoracic Echo Complete W/ Cont if Necessary Per Protocol    Interpretation Summary    Left ventricular systolic function is normal. Left ventricular ejection fraction appears to be 56 - 60%.    Left ventricular diastolic function was indeterminate.    The right ventricular cavity is mildly dilated. Normal right ventricular systolic function noted.    : The left atrial cavity is mildly dilated.    The aortic valve leaflets are mildly to moderately calcified    There is moderate mitral annular calcification    Insufficient TR velocity profile to estimate the right ventricular systolic pressure.    There is no evidence of pericardial effusion        Assessment & Plan   Assessment / Plan     Brief Patient Summary:  Nelson Wang is a 79 y.o. male   Status post cardiac arrest  Hypoxic arrest  Hemoptysis  Concern for intraparenchymal bleed   Status post thoracentesis  Candidemia  Acute on chronic hypoxic respiratory failure  End-stage renal disease on dialysis leading to heart failure  Acute diastolic heart failure with EF of 56-60  Discitis on vancomycin  Type 2 diabetes  A-fib on apixaban    Active Hospital Problems:  Active Hospital Problems    Diagnosis     **Hypoxia      Plan:   Status post CPR yesterday with likely hemoptysis and hypoxic respiratory arrest.  Vent settings at AC VC 24/450/PEEP of 5/FiO2 keep saturation more than 90%.  We will consider starting pressure support today.  Decrease fentanyl and propofol drip as tolerated  Wean Levophed drip.  Add vasopressin drip  We will consider switching CRRT to hemodialysis today.  Right-sided chest tube in place.  Continue to suction.  Continue with IV vancomycin and Zosyn.  Start micafungin for Candida glabrata infection  We will consider removing left femoral central line.  Nebulized TXA x 3 doses obtained this morning.  Bronchoscopy done at bedside with large clots removal.  Was  transfused 2 units packed red blood cells yesterday.  Hemoglobin this morning at 9.3 g/dL.  Continue with Brovana, Pulmicort and add DuoNeb.  Bronchoscopy cultures negative so far.  Patient is DNR now.  We will monitor his hemodynamics closely.  DNR is very appropriate.      VTE Prophylaxis:  Pharmacologic & mechanical VTE prophylaxis orders are present.        CODE STATUS:   Level Of Support Discussed With: Next of Kin (If No Surrogate)  Code Status (Patient has no pulse and is not breathing): No CPR (Do Not Attempt to Resuscitate)  Medical Interventions (Patient has pulse or is breathing): Full Support      Patient is critically ill in ICU with status post cardiac arrest, hypoxic arrest, hemoptysis, large blood clots, likely intraparenchymal bleed, shock, ESRD.  I spent 106 minutes of critical care time excluding any procedure notes, spent in review, analysis, obtaining history and physical, formulating care plan, and I led multi-disciplinary critical care rounds with bedside nurse, respiratory therapist, clinical pharmacist and other allied services. I have discussed the case with primary service and other consultants as well.       Electronically signed by Preston Jimenez MD, 12/7/2024, 06:17 EST.

## 2024-12-07 NOTE — PROGRESS NOTES
Placed on PS 10/5, 50%. Then decreased FiO2 to 40%. Doing well at this time. RN aware. Will continue to monitor.

## 2024-12-07 NOTE — PROGRESS NOTES
Patient was on PS 10/5 from [id number]. Switched back to A/C due to decreased RR and increased ETCO2. Will remain on A/C throughout the night, per Dr. Jimenez, and will perform SBT tomorrow on day shift to evalulate for weaning.

## 2024-12-07 NOTE — PROGRESS NOTES
[facility]   Hospitalist Progress Note  Date: 2024  Patient Name: Nelson Wang  : 1945  MRN: [id number]  Date of admission: 2024  Room/Bed: [facility]      Subjective   Subjective     Chief Complaint: Shortness of breath    Summary:    Nelson Wang is a 79 y.o. 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  Patient was started on supplemental oxygen to maintain his oxygen saturations.  He was given a dose of Bumex overnight.  His chest x-ray was consistent with bilateral pleural effusions.  Pulmonology was consulted for the bilateral pleural effusions and and nephrology consulted for possible volume overload.  Pulmonology was performing a thoracentesis this morning.  Thoracentesis was showing bloody fluid.  Patient suffered CODE BLUE. Likely bleeding into his airways causing hypoxic arrest.  He received CPR for 6 minutes and achieved ROSC.  He was transferred to the ICU, intubated and on blood pressure support.  Bronchoscopy was showing blood clots in his airways.  Patient was started on CRRT.  Patient's medical status is tenuous.  He is currently on 2 pressors though his pressor requirement is decreasing.  CRRT is removing fluid at a slow rate.  Patient started on micafungin on 2024.    Interval Followup:     Patient remains intubated and sedated.  His pressor requirements are decreasing.  There is less blood in his ET tube.  Daughter is at bedside.      All systems reviewed and negative except for what is outlined above.      Objective   Objective     Vitals:   Temp:  [96.7 °F (35.9 °C)-99.5 °F (37.5 °C)] 99.5 °F (37.5 °C)  Heart Rate:  [] 83  Resp:  [24] 24  BP: (102-165)/(48-67) 138/57  Arterial Line BP: ()/(37-76) 115/44  Flow (L/min) (Oxygen Therapy):  [5] 5  FiO2 (%):  [40 %-51 %] 41 %    Physical Exam   General:  Sedated  Cardiovascular: RRR, no murmurs   Pulmonary: Mechanical breath sounds  Musculoskeletal: No swelling    Result Review    Result Review:  I have personally reviewed these results:  [x]  Laboratory      Lab 12/07/24  0912 12/07/24  0606 12/07/24  0245 12/07/24  0007 12/06/24  1811 12/06/24  1248 12/06/24  1238 12/06/24  1206 12/06/24  0748   WBC  --  6.28  --   --   --   --  5.95  --  3.73   HEMOGLOBIN  --  9.3* 9.3*  --   --   --  6.9*  --  8.1*   HEMATOCRIT  --  30.8* 30.5*  --   --   --  24.6*  --  28.2*   PLATELETS  --  109*  --   --   --   --  136*  --  141   NEUTROS ABS  --  4.77  --   --   --   --  1.40*  --  1.66*   IMMATURE GRANS (ABS)  --  0.04  --   --   --   --  0.10*  --  0.01   LYMPHS ABS  --  0.86  --   --   --   --  3.88*  --  1.41   MONOS ABS  --  0.58  --   --   --   --  0.48  --  0.53   EOS ABS  --  0.01  --   --   --   --  0.07  --  0.09   MCV  --  89.3  --   --   --   --  96.5  --  94.9   PROCALCITONIN  --   --   --   --   --   --   --   --  0.27*   LACTATE 1.3  --   --  1.8 2.2*   < > 3.9*   < >  --    PROTIME  --   --   --   --   --   --  15.2*  --   --    APTT  --   --   --   --   --   --  31.1  --   --     < > = values in this interval not displayed.         Lab 12/07/24  1154 12/07/24  0606 12/07/24  0007 12/06/24  1248 12/06/24  1238 12/06/24  0748 12/06/24  0748   SODIUM 136 136  136 140  --  144  --  134*   POTASSIUM 4.5 4.4  4.4 4.2  --  3.6  --  4.1   CHLORIDE 102 103  103 104  --  101  --  95*   CO2 21.7* 22.1  22.1 24.2  --  36.5*  --  28.3   ANION GAP 12.3 10.9  10.9 11.8  --  6.5  --  10.7   BUN 9 9  9 10  --  16  --  15   CREATININE 1.45* 1.81*  1.81* 2.13*   < > 4.90*  --  4.53*   EGFR 49.3* 37.8*  37.8* 31.1*  --  11.4*  --  12.6*   GLUCOSE 98 75  75 80  --  125*  --  86   CALCIUM 8.3* 8.3*  8.3* 8.2*  --  10.1  --  9.1   MAGNESIUM  --  2.1  --   --  1.9  --  1.8   PHOSPHORUS 3.8 3.5 2.7  --  5.7*   < >  --     < > = values in this interval not displayed.          Lab 12/07/24  1154 12/07/24  0606 12/07/24  0007 12/06/24  1238 12/06/24  0748 12/05/24  1635   TOTAL PROTEIN  --  6.4  --   --  7.0 7.4   ALBUMIN 2.8* 2.8*  2.8* 2.8*   < > 3.0* 3.3*   GLOBULIN  --  3.6  --   --  4.0 4.1   ALT (SGPT)  --  47*  --   --  9 10   AST (SGOT)  --  138*  --   --  28 27   BILIRUBIN  --  1.2  --   --  0.5 0.6   ALK PHOS  --  63  --   --  69 72    < > = values in this interval not displayed.         Lab 12/06/24  1238 12/06/24  0748 12/05/24  1854 12/05/24  1635   PROBNP  --   --   --  59,503.0*   HSTROP T  --  128* 113* 103*   PROTIME 15.2*  --   --   --    INR 1.17*  --   --   --              Lab 12/06/24  1507   ABO TYPING AB   RH TYPING Positive   ANTIBODY SCREEN Negative         Lab 12/07/24  0912 12/06/24  1409 12/06/24  1248   PH, ARTERIAL 7.376 7.343* 7.115*   PCO2, ARTERIAL 32.8* 59.7* 102.2*   PO2 ART 91.0 109.7* 362.3*   O2 SATURATION ART 97.0 97.8 99.9*   FIO2 40 50 100   HCO3 ART 19.2* 32.4* 32.8*   BASE EXCESS ART -5.3* 5.0* 1.0     Brief Urine Lab Results  (Last result in the past 365 days)        Color   Clarity   Blood   Leuk Est   Nitrite   Protein   CREAT   Urine HCG        10/27/24 1417 Yellow   Cloudy   Negative   Trace   Negative   >=300 mg/dL (3+)                 [x]  Microbiology   Microbiology Results (last 10 days)       Procedure Component Value - Date/Time    Pneumonia Panel - Lavage, Lung, Right Lower Lobe [[id number]]  (Normal) Collected: 12/06/24 7754    Lab Status: Final result Specimen: Lavage from Lung, Right Lower Lobe Updated: 12/06/24 1859     Escherichia coli PCR Not Detected     Acinetobacter calcoaceticus-baumannii complex PCR Not Detected     Enterobacter cloacae PCR Not Detected     Klebsiella oxytoca PCR Not Detected     Klebsiella pneumoniae group PCR Not Detected     Klebsiella aerogenes PCR Not Detected     Moraxella catarrhalis PCR Not Detected     Proteus species PCR Not Detected     Pseudomonas aeroginosa PCR Not Detected     Serratia  marcescens PCR Not Detected     Staphylococcus aureus PCR Not Detected     Streptococcus pyogenes PCR Not Detected     Haemophilus influenzae PCR Not Detected     Streptococcus agalactiae PCR Not Detected     Streptococcus pneumoniae PCR Not Detected     Chlamydophila pneumoniae PCR Not Detected     Legionella pneumophilia PCR Not Detected     Mycoplasma pneumo by PCR Not Detected     ADENOVIRUS, PCR Not Detected     CTX-M Gene N/A     IMP Gene N/A     KPC Gene N/A     mecA/C and MREJ Gene N/A     NDM Gene N/A     OXA-48-like Gene N/A     VIM Gene N/A     Coronavirus Not Detected     Human Metapneumovirus Not Detected     Human Rhinovirus/Enterovirus Not Detected     Influenza A PCR Not Detected     Influenza B PCR Not Detected     RSV, PCR Not Detected     Parainfluenza virus PCR Not Detected    BAL Culture, Quantitative - Lavage, Bronchus [[id number]] Collected: 12/06/24 1719    Lab Status: Preliminary result Specimen: Lavage from Bronchus Updated: 12/07/24 1028     BAL Culture >100,000 CFU/mL The culture consists of normal respiratory lashae. This is a preliminary report; final report to follow.     Gram Stain No organisms seen    AFB Culture - Body Fluid, Pleural Cavity [[id number]] Collected: 12/06/24 1233    Lab Status: Preliminary result Specimen: Body Fluid from Pleural Cavity Updated: 12/07/24 1217     AFB Stain Few (2+) WBCs seen      No organisms seen      No acid fast bacilli seen on direct smear    Body Fluid Culture - Body Fluid, Pleural Cavity [[id number]] Collected: 12/06/24 1233    Lab Status: Preliminary result Specimen: Body Fluid from Pleural Cavity Updated: 12/07/24 0918     Body Fluid Culture No growth     Gram Stain Few (2+) WBCs seen      No organisms seen    Blood Culture - Blood, Arm, Left [[id number]]  (Abnormal) Collected: 12/05/24 1755    Lab Status: Preliminary result Specimen: Blood from Arm, Left Updated: 12/07/24 0713     Blood Culture Growth present, too young to evaluate     Gram Stain  Aerobic Bottle Gram positive cocci in clusters      Anaerobic Bottle Gram positive cocci in chains    Blood Culture - Blood, Arm, Left [[id number]]  (Abnormal) Collected: 12/05/24 1755    Lab Status: Preliminary result Specimen: Blood from Arm, Left Updated: 12/07/24 0713     Blood Culture Growth present, too young to evaluate     Gram Stain Aerobic Bottle Gram positive cocci in clusters      Anaerobic Bottle Gram positive cocci in chains and clusters    Blood Culture ID, PCR - Blood, Arm, Left [[id number]]  (Abnormal) Collected: 12/05/24 1755    Lab Status: Final result Specimen: Blood from Arm, Left Updated: 12/06/24 1617     BCID, PCR Staph spp, not aureus or lugdunensis. Identification by BCID2 PCR.     BCID, PCR 2 Streptococcus spp, not A, B, or pneumoniae. Identification by BCID2 PCR.     BOTTLE TYPE Aerobic Bottle    Narrative:      Staph Sp mecA/C    Blood Culture ID, PCR - Blood, Arm, Left [[id number]]  (Abnormal) Collected: 12/05/24 1755    Lab Status: Final result Specimen: Blood from Arm, Left Updated: 12/07/24 0943     BCID, PCR Staph spp, not aureus or lugdunensis. Identification by BCID2 PCR.     BCID, PCR 2 Streptococcus spp, not A, B, or pneumoniae. Identification by BCID2 PCR.     BOTTLE TYPE Anaerobic Bottle    Narrative:      S.epi with mecA/C    Blood Culture ID, PCR - Blood, Arm, Left [[id number]]  (Abnormal) Collected: 12/05/24 1755    Lab Status: Final result Specimen: Blood from Arm, Left Updated: 12/07/24 0913     BCID, PCR Streptococcus spp, not A, B, or pneumoniae. Identification by BCID2 PCR.     BCID, PCR 2 Candida glabrata. Identification by BCID2 PCR.     BOTTLE TYPE Anaerobic Bottle    Narrative:      Infectious disease consultation is highly recommended to rule out distant foci of infection.          [x]  Radiology  XR Abdomen KUB    Result Date: 12/7/2024  Impression: Weighted tip feeding catheter projects over the stomach. Electronically Signed: Claus Sánchez MD  12/7/2024 1:08 PM EST   Workstation ID: [id number]    XR Chest 1 View    Result Date: 12/7/2024  Impression: 1.Endotracheal tube in good position. 2.Right-sided chest tube in place. No pneumothorax identified. 3.Diffuse bilateral subcutaneous emphysema appears slightly improved. 4.Patchy airspace disease throughout right lung appears unchanged. Electronically Signed: Winston Reyes MD  12/7/2024 9:25 AM EST  Workstation ID: [id number]    XR Chest 1 View    Result Date: 12/6/2024  Impression: 1.Endotracheal tube tip is 3 cm above the mel. 2.Slight change in position of the right-sided chest tube with increased subcutaneous emphysema tracking throughout both sides of the chest, right worse than left. Evaluation for pneumothorax is limited due to the subcutaneous emphysema. 3.Decreased right-sided airspace disease compared to prior exam. Electronically Signed: Reynaldo Rangel  12/6/2024 4:35 PM EST  Workstation ID: [id number]    XR Chest 1 View    Result Date: 12/6/2024  Impression: 1.Interval retraction of the endotracheal tube which now appears in more appropriate position overlying the mid thoracic trachea. 2.Mildly displaced right lateral seventh rib fracture. There are also mildly displaced left posterior second and third rib fractures. These are new from prior same day radiographs. 3.Persistent multifocal airspace opacities throughout the right lung with right greater than left pleural effusions. 4.Mild interval retraction of the right approach thoracostomy tube. Trace right apical pneumothorax. Increased right lateral chest wall emphysema. Electronically Signed: Devante Lang MD  12/6/2024 2:26 PM EST  Workstation ID: [id number]    XR Chest 1 View    Result Date: 12/6/2024  Impression: 1. Proximal right mainstem intubation. Retraction by 3.5 cm may place the ET tube in a more standard position. 2. Right thoracotomy tube without visible pneumothorax. Small volume right chest wall subcutaneous emphysema. 3. Multifocal airspace disease  and/or atelectasis worsened in the right middle and upper lobes. Based on acuity this could reflect aspiration, hemorrhage or edema. 4. Right greater than left pleural effusions similar to may be slightly improved allowing for technical differences to prior chest radiograph. Findings communicated with ordering provider Vamshi Villafana via [employer] secure chat, message viewed at 12:13 p.m. 12/6/2024. Electronically Signed: Lorne Bustamante MD  12/6/2024 12:23 PM EST  Workstation ID: [id number]    XR Chest 1 View    Result Date: 12/6/2024  Impression: 1.Decreased right-sided pleural effusion status post thoracentesis. No pneumothorax. 2.Similar bilateral airspace opacities and small left pleural effusion. Electronically Signed: Devante Lang MD  12/6/2024 11:30 AM EST  Workstation ID: [id number]    CT Chest Without Contrast Diagnostic    Result Date: 12/6/2024  Impression: 1.Moderate right and small left pleural effusions with bibasilar consolidations, which could represent atelectasis, aspiration, or pneumonia. 2.Interlobular septal thickening bilaterally, suggesting mild pulmonary edema. 3.Cardiomegaly. 4.Erosive changes at T5-6 appear similar, suggesting ongoing discitis/osteomyelitis. Electronically Signed: Winston Reyes MD  12/6/2024 8:44 AM EST  Workstation ID: [id number]    XR Chest 1 View    Result Date: 12/5/2024  Impression: Similar moderate  CHF features. Electronically Signed: Jose Hammond MD  12/5/2024 4:35 PM EST  Workstation ID: [id number]    CT Head Without Contrast    Result Date: 12/1/2024  Impression: 1. Small hematoma within the scalp overlying the right parietal bone at the vertex. No underlying skull fracture. 2. No acute intracranial abnormality. 3. Partial opacification of the right mastoid air cells unchanged from 11/15/2024 Electronically Signed: Stephan Dey MD  12/1/2024 6:27 PM EST  Workstation ID: [id number]    XR Chest 1 View    Result Date: 12/1/2024  Impression: 1. Low volume inspiration  with bibasilar and right perihilar airspace disease which may be secondary to pulmonary edema or less likely pneumonia. 2. New small right pleural effusion. Electronically Signed: Stephan Dey MD  12/1/2024 5:33 PM EST  Workstation ID: [id number]   []  EKG/Telemetry   []  Cardiology/Vascular   []  Pathology  []  Old records  []  Other:    Assessment & Plan   Assessment / Plan     Neuro:  #Intubated and sedated  Continue fentanyl drip  Continue propofol  Attempt to wean as tolerated     CV:  #Status postcardiac arrest  #Undifferentiated shock  Continue levophed  Continue vasopressin  MAP goal 65  Wean as tolerated     #A-fib  Holding apixaban in setting of bleed     Resp:  #Hypoxic respiratory failure  #Hemoptysis  Vent settings tidal volume 400 respiratory rate 24 oxygen 50% PEEP 5  Continue ventilator support  Continue Brovana and Pulmicort  Pulmonology/ICU team following, appreciate recs     # Concern for empyema  Continue Zosyn     Renal:  #End-stage renal disease  Nephrology consulted appreciate recs  Continue CRRT     GI:  Start trickle feed     Endo:  # Type 2 diabetes  Continue sliding scale insulin     #Blood loss anemia  Hgb goal >7     Psych:  #Bipolar disorder  Continue Seroquel     ID:  #Discitis/osteomyelitis  Pharmacy to dose vancomycin  Start micafungin    Rest of care per ICU team     Discussed with RN.    VTE Prophylaxis:  Pharmacologic & mechanical VTE prophylaxis orders are present.        CODE STATUS:   Level Of Support Discussed With: Next of Kin (If No Surrogate)  Code Status (Patient has no pulse and is not breathing): No CPR (Do Not Attempt to Resuscitate)  Medical Interventions (Patient has pulse or is breathing): Full Support      Electronically signed by Wilfred Villafana MD, 12/7/2024, 14:37 EST.

## 2024-12-07 NOTE — PROGRESS NOTES
RT EQUIPMENT DEVICE RELATED - SKIN ASSESSMENT    RT Medical Equipment/Device:     ETT Coley/Anchorfast    Skin Assessment:      Cheek:  Intact  Lips:  Intact  Mouth:  Intact    Device Skin Pressure Protection:  Positioning supports utilized    Nurse Notification:  Kate Singleton, RRT

## 2024-12-07 NOTE — PROGRESS NOTES
"[facility] Clinical Pharmacy Services: Vancomycin Monitoring Note    Nelson Wang is a 79 y.o. male who is on day  of pharmacy to dose vancomycin for Bacteremia, Bone and/or Joint Infection, and Empiric.    Previous Vancomycin Dose:   Patient received 500 mg x1 with HD on 2024  Imaging Reviewed?: Yes  Updated Cultures and Sensitivities:    - Blood - CoNS 2/ Staph epi with MecA/C detected, Strep species not a, b, or pneumoniae / and C glabrata  10/31 - Blood - MRSE  10/31 - Blood - MRSE    Vitals/Labs  Ht: 170.2 cm (67\"); Wt: 70.7 kg (155 lb 13.8 oz)   Temp (24hrs), Av.5 °F (36.9 °C), Min:96.7 °F (35.9 °C), Max:99.1 °F (37.3 °C)   Estimated Creatinine Clearance: 33.6 mL/min (A) (by C-G formula based on SCr of 1.81 mg/dL (H)).   CRRT started 2024 @~1226    Results from last 7 days   Lab Units 24  0606 24  0007 24  1409 24  1248 24  1238 24  0748 24  1635 24  0000   VANCOMYCIN RM mcg/mL 14.16  --   --   --   --  27.00  --  21.4   CREATININE mg/dL 1.81*  1.81* 2.13* 4.59*   < > 4.90* 4.53*   < >  --    WBC 10*3/mm3 6.28  --   --   --  5.95 3.73   < >  --     < > = values in this interval not displayed.     Assessment/Plan    Current Vancomycin Dose: pulse dosing with 1250 mg IV once on 2024 at 1130  Next Vanc Random ordered for 2024 at 0600 with AM labs  We will continue to monitor patient changes and renal function     Thank you for involving pharmacy in this patient's care. Please contact pharmacy with any questions or concerns.    Ciro Sosa [facility]  Clinical Pharmacist  "

## 2024-12-07 NOTE — PROGRESS NOTES
[facility]     Nephrology Progress Note      Patient Name: Nelson Wang  : 1945  MRN: [id number]  Primary Care Physician:  Domingo Bravo MD  Date of admission: 2024    Subjective   Subjective     Interval History:  Events noted.  In ICU, sedated, orally intubated, on CRRT.  More hemodynamically stable.  Still on Levophed.  Wife at bedside.    Review of Systems   Review of systems could not be obtained due to   patient intubated.    Objective   Objective     Vitals:   Temp:  [96.7 °F (35.9 °C)-99.7 °F (37.6 °C)] 99.7 °F (37.6 °C)  Heart Rate:  [] 88  Resp:  [24] 24  BP: (102-165)/(46-67) 142/51  Arterial Line BP: ()/(37-76) 107/67  Flow (L/min) (Oxygen Therapy):  [5] 5  FiO2 (%):  [40 %-51 %] 40 %  Physical Exam:   Constitutional: Orally intubated and sedated.   Eyes: sclerae anicteric, no conjunctival injection   HENT: mucous membranes moist   Neck: Supple, no thyromegaly, no lymphadenopathy, trachea midline, No JVD   Respiratory: Clear to auscultation bilaterally, nonlabored respirations    Cardiovascular: RRR, no murmurs, rubs, or gallops.   Gastrointestinal: Positive bowel sounds, soft, nondistended   Musculoskeletal: No edema, no clubbing or cyanosis, left upper extremity AV fistula is patent with good thrill and bruit.   Psychiatric: Sedated   Neurologic: Sedated   Skin: warm and dry, no rashes   Right femoral A-line and Shiley.  Right-sided chest tube.    Result Review    Result Reviewed:  I have personally reviewed the results from the time of this admission to 2024 16:19 EST and agree with these findings:  [x]  Laboratory  []  Microbiology  [x]  Radiology  []  EKG/Telemetry   []  Cardiology/Vascular   []  Pathology  [x]  Old records  []  Other:        Lab 24  1154 24  0606 24  0007 24  1409 24  1248 24  1238 24  0748 24  1635   SODIUM 136 136  136 140  --   --  144 134* 137   POTASSIUM 4.5 4.4  4.4 4.2  --   --  3.6  4.1 3.9   CHLORIDE 102 103  103 104  --   --  101 95* 97*   CO2 21.7* 22.1  22.1 24.2  --   --  36.5* 28.3 30.6*   BUN 9 9  9 10  --   --  16 15 12   CREATININE 1.45* 1.81*  1.81* 2.13* 4.59* 4.46* 4.90* 4.53* 3.59*   GLUCOSE 98 75  75 80  --   --  125* 86 107*   EGFR 49.3* 37.8*  37.8* 31.1*  --   --  11.4* 12.6* 16.6*   ANION GAP 12.3 10.9  10.9 11.8  --   --  6.5 10.7 9.4   MAGNESIUM  --  2.1  --   --   --  1.9 1.8  --    PHOSPHORUS 3.8 3.5 2.7  --   --  5.7*  --   --            Most notable findings include: As above.  Lactate 1.3  Hemoglobin 9.3      Assessment & Plan   Assessment / Plan       Active Hospital Problems:  Active Hospital Problems    Diagnosis     **Hypoxia        Assessment and Plan:    - ESRD, was on home dialysis.  Now on CRRT after CPR yesterday.  Volume status and electrolytes are adequate.  Discussed with ICU team regarding timing of discontinuation of CRRT.  Will plan on stopping CRRT tomorrow prior to extubation.  Reduce CRRT dose to 750 mL/h for each compartment.  Continue to monitor labs.    - Volume overload, seems controlled now.  - Hypoxia on admission with large pleural effusion, status post thoracentesis complicated by pulmonary hemorrhage, status post right-sided chest tube and bronchoscopy yesterday evening.    -Aspiration pneumonia, per pulmonary.   - Type 2 diabetes with complications.  - History of hypertension, blood pressure is acceptable now on small dose pressors .  - Anemia of chronic kidney disease, hemoglobin below goal.  On RONALD as outpatient.  Getting Epogen while here.  - Hypothyroidism, per primary.  - Altered mentation, could be multifactorial.  Discussed with ICU team and with his wife.    Will follow.      Electronically signed by Coreen Castro MD, 12/7/2024, 16:19 EST.

## 2024-12-07 NOTE — CONSULTS
"Nutrition Services    Patient Name: Nelson Wang  YOB: 1945  MRN: [id number]  Admission date: 12/5/2024      CLINICAL NUTRITION ASSESSMENT      Reason for Assessment  Tube Feeding Assessment     H&P:  Past Medical History:   Diagnosis Date    Abnormal stress test     Anemia     IRON INFUSIONS WITH DIALYSIS    Anesthesia     WITH HIP REPLACEMENT DAUGHTER BELIEVES HAD SVT 2000    Ankle pain, right     Anxiety and depression     Arthritis     CKD (chronic kidney disease), stage IV     DIALYSIS [id number] SHILEY IN RIGHT CHEST    Diabetes     GERD (gastroesophageal reflux disease)     Gout     High cholesterol     History of peritoneal dialysis     HL (hearing loss)     Hyperkalemia     Hypertension     Hypothyroidism     Insomnia     Night terrors     Psoriasis     Psoriasis     Sleep apnea     Sleep walking     Thrombocytopenia     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    Vitiligo         Current Problems:   Active Hospital Problems    Diagnosis     **Hypoxia         Nutrition/Diet History         Narrative   Pt sedated on vent. Consult for TF. Discussed recommendations with MD. EN contraindicated with multiple pressors; meds include Vasopressin and Levophed, as well as Propofol and Fentanyl. On CRRT, per Renal recs.   Skin- areas of concern: Rt upper chest, BRICE knees abrasions, Lt lower leg skin tear  -BM, since admission     Anthropometrics        Current Height, Weight Height: 170.2 cm (67\")  Weight: 70.7 kg (155 lb 13.8 oz)   Current BMI Body mass index is 24.41 kg/m².   BMI Classification Normal range   % %, IBW 67.2 kg   Adjusted Body Weight (ABW)    Weight Hx  Wt Readings from Last 15 Encounters:   12/06/24 1228 70.7 kg (155 lb 13.8 oz)   12/05/24 1850 75.3 kg (166 lb 0.1 oz)   12/01/24 1701 71 kg (156 lb 8.4 oz)   11/22/24 1311 71.8 kg (158 lb 3.2 oz)   11/18/24 0351 67.9 kg (149 lb 11.1 oz)   11/17/24 0255 71.9 kg (158 lb 8.2 oz)   11/16/24 0316 67.9 kg (149 lb 11.1 oz) "   11/14/24 0611 73.9 kg (162 lb 14.7 oz)   11/13/24 0500 73 kg (161 lb)   11/12/24 0523 73.2 kg (161 lb 6 oz)   11/11/24 1200 72.8 kg (160 lb 7.9 oz)   11/10/24 0500 74.4 kg (164 lb 0.4 oz)   11/08/24 0640 73 kg (160 lb 14.4 oz)   11/07/24 0621 72.7 kg (160 lb 3.2 oz)   11/05/24 0600 68.9 kg (151 lb 14.4 oz)   11/04/24 1402 67 kg (147 lb 11.3 oz)   11/04/24 0410 67.9 kg (149 lb 11.1 oz)   11/01/24 0159 74.3 kg (163 lb 12.8 oz)   10/27/24 0700 72.5 kg (159 lb 13.3 oz)   10/27/24 0346 75.7 kg (166 lb 14.2 oz)   10/09/24 1402 75.7 kg (166 lb 12.8 oz)   10/08/24 1455 75.3 kg (166 lb 0.1 oz)   10/02/24 0812 77.5 kg (170 lb 13.7 oz)   09/28/24 0330 77.2 kg (170 lb 3.1 oz)   09/27/24 1659 76.2 kg (167 lb 15.9 oz)   09/26/24 0823 78.9 kg (174 lb)   09/16/24 1814 79.3 kg (174 lb 13.2 oz)   09/12/24 1344 77.1 kg (169 lb 15.6 oz)   08/30/24 0927 75.6 kg (166 lb 10.7 oz)   08/14/24 0922 76.7 kg (169 lb)   07/17/24 1116 77.2 kg (170 lb 3.2 oz)          Wt Change Observation Wt down 5# in 1 month; likely fluid fluctuations ESRD/HD. Will monitor.     Estimated/Assessed Needs  Estimated Needs based on: Current Body Weight 70.7 kg       Energy Requirements 25-30 kcal/kg   EST Needs (kcal/day) 7914-9104 kcal       Protein Requirements 1.5-2.5 g  CRRT   EST Daily Needs (g/day) 106-177 g       Fluid Requirements Per Renal/ICU, CRRT    Estimated Needs (mL/day)      Labs/Medications         Pertinent Labs Reviewed.   Results from last 7 days   Lab Units 12/07/24  0606 12/07/24  0007 12/06/24  1409 12/06/24  1248 12/06/24  1238 12/06/24  0748 12/05/24  1635   SODIUM mmol/L 136  136 140  --   --  144 134* 137   POTASSIUM mmol/L 4.4  4.4 4.2  --   --  3.6 4.1 3.9   CHLORIDE mmol/L 103  103 104  --   --  101 95* 97*   CO2 mmol/L 22.1  22.1 24.2  --   --  36.5* 28.3 30.6*   BUN mg/dL 9  9 10  --   --  16 15 12   CREATININE mg/dL 1.81*  1.81* 2.13* 4.59*   < > 4.90* 4.53* 3.59*   CALCIUM mg/dL 8.3*  8.3* 8.2*  --   --  10.1 9.1 9.4    BILIRUBIN mg/dL 1.2  --   --   --   --  0.5 0.6   ALK PHOS U/L 63  --   --   --   --  69 72   ALT (SGPT) U/L 47*  --   --   --   --  9 10   AST (SGOT) U/L 138*  --   --   --   --  28 27   GLUCOSE mg/dL 75  75 80  --   --  125* 86 107*    < > = values in this interval not displayed.     Results from last 7 days   Lab Units 12/07/24  0606 12/07/24  0007 12/06/24  1238 12/06/24  0748   MAGNESIUM mg/dL 2.1  --  1.9 1.8   PHOSPHORUS mg/dL 3.5   < > 5.7*  --    HEMOGLOBIN g/dL 9.3*   < > 6.9* 8.1*   HEMATOCRIT % 30.8*   < > 24.6* 28.2*    < > = values in this interval not displayed.     COVID19   Date Value Ref Range Status   10/08/2024 Not Detected Not Detected - Ref. Range Final     Lab Results   Component Value Date    HGBA1C 5.90 (H) 11/09/2024         Pertinent Medications Reviewed.     Malnutrition Severity Assessment              Nutrition Diagnosis         Nutrition Dx Problem 1 Inadequate oral Intake related to Inability to consume sufficient energy as evidenced by intubated/sedated, NPO.     Nutrition Intervention           Current Nutrition Orders & Evaluation of Intake       Current PO Diet NPO Diet NPO Type: Strict NPO   Supplement No active supplement orders           Nutrition Intervention/Prescription        If able to wean pressors, suggest place Cortrak.  Initiate Novasource Renal at trickle rate. RD will re-assess, 12/9.        Medical Nutrition Therapy/Nutrition Education          Learner     Readiness N/A  N/A     Method     Response N/A  N/A     Monitor/Evaluation        Monitor PO intake, Pertinent labs, EN delivery/tolerance, GI status, Hemodynamic stability     Nutrition Discharge Plan         To be determined     Electronically signed by:  Bárbara Gagnon RD  12/07/24 10:52 EST

## 2024-12-07 NOTE — PROGRESS NOTES
RT EQUIPMENT DEVICE RELATED - SKIN ASSESSMENT    RT Medical Equipment/Device:     ETT Coley/Anchorfast    Skin Assessment:      Cheek:  Intact  Ears:  Intact  Neck:  Intact  Nose:  Intact  Lips:  Intact  Mouth:  Intact  Tongue:  Intact    Device Skin Pressure Protection:  Positioning supports utilized    Nurse Notification:  Kate Acevedo, CRT

## 2024-12-08 ENCOUNTER — APPOINTMENT (OUTPATIENT)
Dept: GENERAL RADIOLOGY | Facility: HOSPITAL | Age: 79
End: 2024-12-08
Payer: MEDICARE

## 2024-12-08 LAB
ALBUMIN SERPL-MCNC: 2.8 G/DL (ref 3.5–5.2)
ANION GAP SERPL CALCULATED.3IONS-SCNC: 12.3 MMOL/L (ref 5–15)
ANION GAP SERPL CALCULATED.3IONS-SCNC: 14.5 MMOL/L (ref 5–15)
ARTERIAL PATENCY WRIST A: ABNORMAL
ATMOSPHERIC PRESS: 743.3 MMHG
BACTERIA SPEC AEROBE CULT: NORMAL
BASE EXCESS BLDA CALC-SCNC: -5.3 MMOL/L (ref -2–2)
BASOPHILS # BLD AUTO: 0.03 10*3/MM3 (ref 0–0.2)
BASOPHILS NFR BLD AUTO: 0.4 % (ref 0–1.5)
BDY SITE: ABNORMAL
BH BB BLOOD EXPIRATION DATE: NORMAL
BH BB BLOOD EXPIRATION DATE: NORMAL
BH BB BLOOD TYPE BARCODE: 8400
BH BB BLOOD TYPE BARCODE: 8400
BH BB DISPENSE STATUS: NORMAL
BH BB DISPENSE STATUS: NORMAL
BH BB PRODUCT CODE: NORMAL
BH BB PRODUCT CODE: NORMAL
BH BB UNIT NUMBER: NORMAL
BH BB UNIT NUMBER: NORMAL
BUN SERPL-MCNC: 10 MG/DL (ref 8–23)
BUN SERPL-MCNC: 13 MG/DL (ref 8–23)
BUN/CREAT SERPL: 7.2 (ref 7–25)
BUN/CREAT SERPL: 9 (ref 7–25)
CALCIUM SPEC-SCNC: 8.2 MG/DL (ref 8.6–10.5)
CALCIUM SPEC-SCNC: 8.3 MG/DL (ref 8.6–10.5)
CHLORIDE SERPL-SCNC: 102 MMOL/L (ref 98–107)
CHLORIDE SERPL-SCNC: 104 MMOL/L (ref 98–107)
CO2 SERPL-SCNC: 19.5 MMOL/L (ref 22–29)
CO2 SERPL-SCNC: 19.7 MMOL/L (ref 22–29)
CREAT SERPL-MCNC: 1.39 MG/DL (ref 0.76–1.27)
CREAT SERPL-MCNC: 1.45 MG/DL (ref 0.76–1.27)
CROSSMATCH INTERPRETATION: NORMAL
CROSSMATCH INTERPRETATION: NORMAL
D-LACTATE SERPL-SCNC: 1.1 MMOL/L (ref 0.5–2)
D-LACTATE SERPL-SCNC: 1.9 MMOL/L (ref 0.5–2)
DEPRECATED RDW RBC AUTO: 67 FL (ref 37–54)
EGFRCR SERPLBLD CKD-EPI 2021: 49.3 ML/MIN/1.73
EGFRCR SERPLBLD CKD-EPI 2021: 51.9 ML/MIN/1.73
EOSINOPHIL # BLD AUTO: 0.01 10*3/MM3 (ref 0–0.4)
EOSINOPHIL NFR BLD AUTO: 0.1 % (ref 0.3–6.2)
ERYTHROCYTE [DISTWIDTH] IN BLOOD BY AUTOMATED COUNT: 20.2 % (ref 12.3–15.4)
GLUCOSE BLDC GLUCOMTR-MCNC: 116 MG/DL (ref 70–99)
GLUCOSE BLDC GLUCOMTR-MCNC: 140 MG/DL (ref 70–99)
GLUCOSE BLDC GLUCOMTR-MCNC: 146 MG/DL (ref 70–99)
GLUCOSE BLDC GLUCOMTR-MCNC: 171 MG/DL (ref 70–99)
GLUCOSE SERPL-MCNC: 138 MG/DL (ref 65–99)
GLUCOSE SERPL-MCNC: 142 MG/DL (ref 65–99)
GRAM STN SPEC: NORMAL
HCO3 BLDA-SCNC: 20.6 MMOL/L (ref 22–26)
HCT VFR BLD AUTO: 30.7 % (ref 37.5–51)
HCT VFR BLD CALC: 26 % (ref 38–51)
HEMODILUTION: NO
HGB BLD-MCNC: 9.3 G/DL (ref 13–17.7)
HGB BLDA-MCNC: 9 G/DL (ref 12–18)
IMM GRANULOCYTES # BLD AUTO: 0.05 10*3/MM3 (ref 0–0.05)
IMM GRANULOCYTES NFR BLD AUTO: 0.7 % (ref 0–0.5)
INHALED O2 CONCENTRATION: 35 %
LYMPHOCYTES # BLD AUTO: 0.84 10*3/MM3 (ref 0.7–3.1)
LYMPHOCYTES NFR BLD AUTO: 12.1 % (ref 19.6–45.3)
MAGNESIUM SERPL-MCNC: 2.3 MG/DL (ref 1.6–2.4)
MAGNESIUM SERPL-MCNC: 2.3 MG/DL (ref 1.6–2.4)
MCH RBC QN AUTO: 27.3 PG (ref 26.6–33)
MCHC RBC AUTO-ENTMCNC: 30.3 G/DL (ref 31.5–35.7)
MCV RBC AUTO: 90 FL (ref 79–97)
MODALITY: ABNORMAL
MONOCYTES # BLD AUTO: 0.66 10*3/MM3 (ref 0.1–0.9)
MONOCYTES NFR BLD AUTO: 9.5 % (ref 5–12)
NEUTROPHILS NFR BLD AUTO: 5.36 10*3/MM3 (ref 1.7–7)
NEUTROPHILS NFR BLD AUTO: 77.2 % (ref 42.7–76)
NRBC BLD AUTO-RTO: 0 /100 WBC (ref 0–0.2)
PCO2 BLDA: 41.1 MM HG (ref 35–45)
PEEP RESPIRATORY: 5 CM[H2O]
PH BLDA: 7.31 PH UNITS (ref 7.35–7.45)
PHOSPHATE SERPL-MCNC: 3.5 MG/DL (ref 2.5–4.5)
PHOSPHATE SERPL-MCNC: 4.4 MG/DL (ref 2.5–4.5)
PLATELET # BLD AUTO: 143 10*3/MM3 (ref 140–450)
PMV BLD AUTO: 10.3 FL (ref 6–12)
PO2 BLD: 198 MM[HG] (ref 0–500)
PO2 BLDA: 69.4 MM HG (ref 80–100)
POTASSIUM SERPL-SCNC: 4.4 MMOL/L (ref 3.5–5.2)
POTASSIUM SERPL-SCNC: 4.6 MMOL/L (ref 3.5–5.2)
PSV: 10 CMH2O
RBC # BLD AUTO: 3.41 10*6/MM3 (ref 4.14–5.8)
SAO2 % BLDCOA: 91.9 % (ref 95–99)
SODIUM SERPL-SCNC: 136 MMOL/L (ref 136–145)
SODIUM SERPL-SCNC: 136 MMOL/L (ref 136–145)
UNIT  ABO: NORMAL
UNIT  ABO: NORMAL
UNIT  RH: NORMAL
UNIT  RH: NORMAL
VANCOMYCIN SERPL-MCNC: 23.9 MCG/ML (ref 5–40)
VENTILATOR MODE: ABNORMAL
WBC NRBC COR # BLD AUTO: 6.95 10*3/MM3 (ref 3.4–10.8)

## 2024-12-08 PROCEDURE — 71045 X-RAY EXAM CHEST 1 VIEW: CPT

## 2024-12-08 PROCEDURE — 94799 UNLISTED PULMONARY SVC/PX: CPT

## 2024-12-08 PROCEDURE — 25010000002 PIPERACILLIN SOD-TAZOBACTAM PER 1 G: Performed by: INTERNAL MEDICINE

## 2024-12-08 PROCEDURE — 99232 SBSQ HOSP IP/OBS MODERATE 35: CPT | Performed by: STUDENT IN AN ORGANIZED HEALTH CARE EDUCATION/TRAINING PROGRAM

## 2024-12-08 PROCEDURE — 94761 N-INVAS EAR/PLS OXIMETRY MLT: CPT

## 2024-12-08 PROCEDURE — 94664 DEMO&/EVAL PT USE INHALER: CPT

## 2024-12-08 PROCEDURE — 83605 ASSAY OF LACTIC ACID: CPT | Performed by: INTERNAL MEDICINE

## 2024-12-08 PROCEDURE — 25010000002 MICAFUNGIN SODIUM 100 MG RECONSTITUTED SOLUTION 1 EACH VIAL: Performed by: INTERNAL MEDICINE

## 2024-12-08 PROCEDURE — 0B978ZX DRAINAGE OF LEFT MAIN BRONCHUS, VIA NATURAL OR ARTIFICIAL OPENING ENDOSCOPIC, DIAGNOSTIC: ICD-10-PCS | Performed by: INTERNAL MEDICINE

## 2024-12-08 PROCEDURE — 94003 VENT MGMT INPAT SUBQ DAY: CPT

## 2024-12-08 PROCEDURE — 25010000002 HEPARIN (PORCINE) PER 1000 UNITS: Performed by: INTERNAL MEDICINE

## 2024-12-08 PROCEDURE — 99291 CRITICAL CARE FIRST HOUR: CPT | Performed by: INTERNAL MEDICINE

## 2024-12-08 PROCEDURE — 83735 ASSAY OF MAGNESIUM: CPT | Performed by: NURSE PRACTITIONER

## 2024-12-08 PROCEDURE — 84100 ASSAY OF PHOSPHORUS: CPT | Performed by: NURSE PRACTITIONER

## 2024-12-08 PROCEDURE — 82803 BLOOD GASES ANY COMBINATION: CPT

## 2024-12-08 PROCEDURE — 31622 DX BRONCHOSCOPE/WASH: CPT | Performed by: INTERNAL MEDICINE

## 2024-12-08 PROCEDURE — 94660 CPAP INITIATION&MGMT: CPT

## 2024-12-08 PROCEDURE — 80048 BASIC METABOLIC PNL TOTAL CA: CPT | Performed by: STUDENT IN AN ORGANIZED HEALTH CARE EDUCATION/TRAINING PROGRAM

## 2024-12-08 PROCEDURE — 82948 REAGENT STRIP/BLOOD GLUCOSE: CPT

## 2024-12-08 PROCEDURE — 25010000003 VASOPRESSIN 0.2 UNITS/ML SOLUTION: Performed by: INTERNAL MEDICINE

## 2024-12-08 PROCEDURE — 82948 REAGENT STRIP/BLOOD GLUCOSE: CPT | Performed by: INTERNAL MEDICINE

## 2024-12-08 RX ORDER — CALCIUM CHLORIDE, MAGNESIUM CHLORIDE, SODIUM CHLORIDE, SODIUM BICARBONATE, POTASSIUM CHLORIDE AND SODIUM PHOSPHATE DIBASIC DIHYDRATE 3.68; 3.05; 6.34; 3.09; .314; .187 G/L; G/L; G/L; G/L; G/L; G/L
500 INJECTION INTRAVENOUS CONTINUOUS
Status: DISCONTINUED | OUTPATIENT
Start: 2024-12-08 | End: 2024-12-11

## 2024-12-08 RX ORDER — DEXMEDETOMIDINE HYDROCHLORIDE 4 UG/ML
.2-1.5 INJECTION, SOLUTION INTRAVENOUS
Status: DISCONTINUED | OUTPATIENT
Start: 2024-12-08 | End: 2024-12-11

## 2024-12-08 RX ORDER — HEPARIN SODIUM 1000 [USP'U]/ML
INJECTION, SOLUTION INTRAVENOUS; SUBCUTANEOUS AS NEEDED
Status: DISCONTINUED | OUTPATIENT
Start: 2024-12-08 | End: 2025-01-08 | Stop reason: HOSPADM

## 2024-12-08 RX ADMIN — VASOPRESSIN IN 0.9% SODIUM CHLORIDE 0.03 UNITS/MIN: 20 INJECTION INTRAVENOUS at 17:45

## 2024-12-08 RX ADMIN — LEVOTHYROXINE SODIUM 175 MCG: 0.03 TABLET ORAL at 05:27

## 2024-12-08 RX ADMIN — HEPARIN SODIUM 2000 UNITS: 1000 INJECTION INTRAVENOUS; SUBCUTANEOUS at 02:28

## 2024-12-08 RX ADMIN — BUDESONIDE 0.5 MG: 0.5 INHALANT ORAL at 21:47

## 2024-12-08 RX ADMIN — DEXMEDETOMIDINE HYDROCHLORIDE 0.2 MCG/KG/HR: 4 INJECTION, SOLUTION INTRAVENOUS at 17:45

## 2024-12-08 RX ADMIN — Medication 10 ML: at 09:56

## 2024-12-08 RX ADMIN — WHITE PETROLATUM 1 APPLICATION: 1.75 OINTMENT TOPICAL at 09:56

## 2024-12-08 RX ADMIN — VASOPRESSIN IN 0.9% SODIUM CHLORIDE 0.03 UNITS/MIN: 20 INJECTION INTRAVENOUS at 05:28

## 2024-12-08 RX ADMIN — CALCIUM CHLORIDE, MAGNESIUM CHLORIDE, SODIUM CHLORIDE, SODIUM BICARBONATE, POTASSIUM CHLORIDE AND SODIUM PHOSPHATE DIBASIC DIHYDRATE 500 ML/HR: 3.68; 3.05; 6.34; 3.09; .314; .187 INJECTION INTRAVENOUS at 21:05

## 2024-12-08 RX ADMIN — PIPERACILLIN AND TAZOBACTAM 4.5 G: 4; .5 INJECTION, POWDER, FOR SOLUTION INTRAVENOUS; PARENTERAL at 23:09

## 2024-12-08 RX ADMIN — Medication 10 ML: at 21:06

## 2024-12-08 RX ADMIN — MICAFUNGIN SODIUM 100 MG: 100 INJECTION, POWDER, LYOPHILIZED, FOR SOLUTION INTRAVENOUS at 09:56

## 2024-12-08 RX ADMIN — BUDESONIDE 0.5 MG: 0.5 INHALANT ORAL at 10:34

## 2024-12-08 RX ADMIN — ARFORMOTEROL TARTRATE 15 MCG: 15 SOLUTION RESPIRATORY (INHALATION) at 21:47

## 2024-12-08 RX ADMIN — PIPERACILLIN AND TAZOBACTAM 4.5 G: 4; .5 INJECTION, POWDER, FOR SOLUTION INTRAVENOUS; PARENTERAL at 11:05

## 2024-12-08 RX ADMIN — ARFORMOTEROL TARTRATE 15 MCG: 15 SOLUTION RESPIRATORY (INHALATION) at 10:34

## 2024-12-08 NOTE — PROGRESS NOTES
[facility]     Nephrology Progress Note      Patient Name: Nelson Wang  : 1945  MRN: [id number]  Primary Care Physician:  Domingo Bravo MD  Date of admission: 2024    Subjective   Subjective     Interval History:  Events noted.  In ICU, extubated earlier this morning.  CRRT stopped around 3 AM.    Still somnolent/lethargic.  Hemodynamically stable.  Off Levophed.    Daughter at bedside.    Review of Systems   Review of systems could not be obtained due to   patient intubated.    Objective   Objective     Vitals:   Temp:  [97.3 °F (36.3 °C)-100.6 °F (38.1 °C)] 99.9 °F (37.7 °C)  Heart Rate:  [76-95] 84  Resp:  [13-20] 13  BP: (125-164)/(48-63) 131/61  Arterial Line BP: ()/() 119/47  FiO2 (%):  [35 %-40 %] 35 %  Physical Exam:   Constitutional: Resting, lethargic on CPAP .   Eyes: sclerae anicteric, no conjunctival injection   HENT: mucous membranes moist   Neck: Supple, no thyromegaly, no lymphadenopathy, trachea midline, No JVD   Respiratory: Decreased  to auscultation bilaterally, nonlabored respirations, right-sided chest tube.   Cardiovascular: RRR, no murmurs, rubs, or gallops.   Gastrointestinal: Positive bowel sounds, soft, nondistended   Musculoskeletal: No edema, no clubbing or cyanosis, left upper extremity AV fistula is patent with good thrill and bruit.   Psychiatric: Lethargic   Neurologic:    Skin: warm and dry, no rashes   Right femoral A-line and Shiley.  Right-sided chest tube.    Result Review    Result Reviewed:  I have personally reviewed the results from the time of this admission to 2024 16:07 EST and agree with these findings:  [x]  Laboratory  []  Microbiology  [x]  Radiology  []  EKG/Telemetry   []  Cardiology/Vascular   []  Pathology  [x]  Old records  []  Other:        Lab 24  0410 24  2346 24  1822 24  1154 24  0606 24  0007 24  1409 24  1248 24  1238 24  0748 24  1635   SODIUM 136  136 137 136 136  136 140  --   --  144 134* 137   POTASSIUM 4.4 4.6 4.6 4.5 4.4  4.4 4.2  --   --  3.6 4.1 3.9   CHLORIDE 102 104 103 102 103  103 104  --   --  101 95* 97*   CO2 19.5* 19.7* 21.1* 21.7* 22.1  22.1 24.2  --   --  36.5* 28.3 30.6*   BUN 13 10 9 9 9  9 10  --   --  16 15 12   CREATININE 1.45* 1.39* 1.37* 1.45* 1.81*  1.81* 2.13* 4.59* 4.46* 4.90* 4.53* 3.59*   GLUCOSE 142* 138* 83 98 75  75 80  --   --  125* 86 107*   EGFR 49.3* 51.9* 52.8* 49.3* 37.8*  37.8* 31.1*  --   --  11.4* 12.6* 16.6*   ANION GAP 14.5 12.3 12.9 12.3 10.9  10.9 11.8  --   --  6.5 10.7 9.4   MAGNESIUM 2.3 2.3  --   --  2.1  --   --   --  1.9 1.8  --    PHOSPHORUS 3.5 4.4 4.6* 3.8 3.5 2.7  --   --  5.7*  --   --            Most notable findings include: As above.  Lactate 1.3  Hemoglobin 9.3      Assessment & Plan   Assessment / Plan       Active Hospital Problems:  Active Hospital Problems    Diagnosis     **Hypoxia        Assessment and Plan:    - ESRD, was on home dialysis.  Now on CRRT after CPR yesterday.  Volume status and electrolytes are adequate.  Status post CRRT.  Plan dialysis tomorrow.  Will use his right femoral Shiley if it is still in place otherwise left upper extremity AV fistula.  Extubated earlier today.  Continue to monitor labs.    - Volume overload, seems controlled now.  - Hypoxia on admission with large pleural effusion, status post thoracentesis complicated by pulmonary hemorrhage, status post right-sided chest tube and bronchoscopy.  Intubation then extubation today.  - Aspiration pneumonia, per pulmonary.   - Type 2 diabetes with complications.  - History of hypertension, blood pressure is acceptable now on small dose pressors .  - Anemia of chronic kidney disease, hemoglobin below goal.  On RONALD as outpatient.  Getting Epogen while here.  - Hypothyroidism, per primary.  - Altered mentation, could be multifactorial.  Discussed with ICU team and with his daughters.  Will  follow.      Electronically signed by Coreen Castro MD, 12/8/2024, 16:07 EST.

## 2024-12-08 NOTE — PROGRESS NOTES
RT EQUIPMENT DEVICE RELATED - SKIN ASSESSMENT    RT Medical Equipment/Device:     NIV Mask:  Under-the-nose   size:  a    Skin Assessment:      Cheek:  Intact  Chin:  Intact  Nose:  Intact    Device Skin Pressure Protection:  Pressure points protected    Nurse Notification:  Kate Singleton, RRT

## 2024-12-08 NOTE — SIGNIFICANT NOTE
12/08/24 0000   Vitals   Temp 98.2 °F (36.8 °C)   Heart Rate 92   Resp Rate (Observed) Vent 22   /63   Noninvasive MAP (mmHg) 82   Oxygen Therapy   SpO2 98 %   Art Line   Arterial Line /55   Arterial Line MAP (mmHg) 91 mmHg      Atrial line was able to pull back for labs, power flushed with 10 ml syringe.  Wave form appropriate at this time.

## 2024-12-08 NOTE — PROCEDURES
Bronchoscopy Procedure Note    Procedure:  Bronchoscopy with airway inspection  Bronchoscopy with bronchial washings tracheobronchial tree    Pre-Operative Diagnosis: Hemoptysis, history of blood clots with hemoptysis  Post-Operative Diagnosis: No hemoptysis, no secretions    Indication:  High peak pressures on vent    Anesthesia: Moderate sedation    Procedure Details: Family was consented for the procedure with all risks and benefits of the procedure explained in detail.  Family was given the opportunity to ask questions and all concerns were answered.    The bronchoscope was inserted into the main airway via the ET tube. An anatomical survey was done of the main airways and the subsegmental bronchus. The findings are reported below.  ET tube was ending mid trachea. No significant mucous plug or blood clots were seen    Findings:  ET tube ending in mid trachea.  Friable mucosa  No blood clots seen.    Estimated Blood Loss: Insignificant    Specimens:  None.    Complications: None; patient tolerated the procedure well.    Disposition: Remains on vent, will consider extubation this morning.     Patient tolerated the procedure well.      Electronically signed by Preston Jimenez MD, 12/8/2024, 12:24 EST.

## 2024-12-08 NOTE — PLAN OF CARE
Goal Outcome Evaluation:         Patient was extubated from vent to Bipap this shift. Is currently on AIRVO and will go back on Bipap tonight. Vent left at bedside as precaution advised by BECKY Dumont

## 2024-12-08 NOTE — PROGRESS NOTES
RT EQUIPMENT DEVICE RELATED - SKIN ASSESSMENT    RT Medical Equipment/Device:     High Flow Nasal Cannula:Airvo    Skin Assessment:      Cheek:  Intact  Ears:  Intact  Nares:  Intact  Neck:  Intact  Nose:  Intact    Device Skin Pressure Protection:  Positioning supports utilized    Nurse Notification:  Kate Acevedo, CRT

## 2024-12-08 NOTE — PROGRESS NOTES
[facility]     Progress Note    Patient Name: Nelson Wang  : 1945  MRN: [id number]  Primary Care Physician:  Domingo Bravo MD  Date of admission: 2024    Subjective   Subjective       Chief Complaint:   Patient is critically ill status post cardiac arrest with hemoptysis, hypoxic respiratory failure, likely hypoxic arrest, with acute hypoxic respiratory failure, acute on chronic congestive diastolic heart failure with EF of 50 to 60%, grade 1 diastolic dysfunction, acute cardiogenic pulmonary edema loculated right-sided pleural effusion, ESRD on hemodialysis, GASTON with home CPAP.    Critical drips: Levophed, vasopressin  Lines and tubes: ET tube, central line, dialysis cath, arterial line    Over last 24 hours, remained on  invasive mechanical ventilator.  Continued with CRRT.  Remained on antibiotics, started on antifungal.  Had a large mucous plug removed by respite therapy overnight.    Overnight, CRRT clotted off.    This morning,  Patient is on invasive mechanical ventilator, switched to pressure support this morning.   Levophed drip at 0.02 mg/kg/min.  Vasopressin at 0.03 units.    No fever noted.  Tmax was 100.6 °F.  Blood culture came back positive for Candida glabrata and strep and Staph aureus.  He is not optimally responsive on invasive mechanical ventilator.  Right-sided chest tube in place.  Chest tube without air leak.    Review of Systems  Could not be obtained, patient not optimally responsive.      Objective   Objective     Vitals:   Temp:  [97.3 °F (36.3 °C)-100.6 °F (38.1 °C)] 100 °F (37.8 °C)  Heart Rate:  [72-95] 80  Resp:  [13-20] 13  BP: (125-164)/(46-63) 131/61  Arterial Line BP: ()/() 114/47  FiO2 (%):  [35 %-41 %] 35 %  Physical Exam   Vital Signs Reviewed  Critically ill, on invasive mechanical ventilator  HEENT:  PERRL. ET tube orally  Neck:  Supple, No JVD, no thyromegaly  Lymph: no axillary, cervical, supraclavicular lymphadenopathy noted  bilaterally  Chest:  poor aeration, diminished breath sounds, resonant to percussion b/l, no increased work of breathing noted, right-sided chest tube in place, right-sided subcu emphysema+  CV: RRR, no MGR, pulses 2+, equal  Abd:  Soft, NT, ND, + BS, no HSM  EXT:  no clubbing, no cyanosis, No BLE edema, left arm AV fistula in place  Neuro: Not optimally responsive, on invasive mechanical ventilator, responsive to noxious stimuli and opens eyes to commands  Skin: No rashes or lesions noted      Result Review    Result Review:  I have personally reviewed the results from the time of this admission to 12/8/2024 12:05 EST and agree with these findings:  [x]  Laboratory  [x]  Microbiology  [x]  Radiology  [x]  EKG/Telemetry   [x]  Cardiology/Vascular   []  Pathology  []  Old records  []  Other:  Most notable findings include:           Lab 12/08/24  0410 12/07/24  2346 12/07/24  1822 12/07/24  1154 12/07/24  0606 12/07/24  0245 12/07/24  0007 12/06/24  1409 12/06/24  1248 12/06/24  1238 12/06/24  0748 12/05/24  1635   WBC  --  6.95  --   --  6.28  --   --   --   --  5.95 3.73 4.13   HEMOGLOBIN  --  9.3*  --   --  9.3* 9.3*  --   --   --  6.9* 8.1* 8.4*   HEMATOCRIT  --  30.7*  --   --  30.8* 30.5*  --   --   --  24.6* 28.2* 29.0*   PLATELETS  --  143  --   --  109*  --   --   --   --  136* 141 132*   SODIUM 136 136 137 136 136  136  --  140  --   --  144 134* 137   POTASSIUM 4.4 4.6 4.6 4.5 4.4  4.4  --  4.2  --   --  3.6 4.1 3.9   CHLORIDE 102 104 103 102 103  103  --  104  --   --  101 95* 97*   CO2 19.5* 19.7* 21.1* 21.7* 22.1  22.1  --  24.2  --   --  36.5* 28.3 30.6*   BUN 13 10 9 9 9  9  --  10  --   --  16 15 12   CREATININE 1.45* 1.39* 1.37* 1.45* 1.81*  1.81*  --  2.13* 4.59*   < > 4.90* 4.53* 3.59*   GLUCOSE 142* 138* 83 98 75  75  --  80  --   --  125* 86 107*   CALCIUM 8.2* 8.3* 8.6 8.3* 8.3*  8.3*  --  8.2*  --   --  10.1 9.1 9.4   PHOSPHORUS 3.5 4.4 4.6* 3.8 3.5  --  2.7  --   --  5.7*  --   --     TOTAL PROTEIN  --   --   --   --  6.4  --   --   --   --   --  7.0 7.4   ALBUMIN  --  2.8* 3.0* 2.8* 2.8*  2.8*  --  2.8*  --   --  2.7* 3.0* 3.3*   GLOBULIN  --   --   --   --  3.6  --   --   --   --   --  4.0 4.1    < > = values in this interval not displayed.     XR Chest 1 View    Result Date: 12/8/2024  XR CHEST 1 VW Date of Exam: 12/8/2024 6:08 AM EST Indication: chest tube in place, resp failure Comparison: 12/7/2024 Findings: Heart remains enlarged. ET tube is in good position. Feeding tube is below the diaphragm and not seen. Right-sided chest tube is unchanged in position. Subcutaneous gas in the chest wall is again identified. No visible pneumothorax on either side of the chest. Bilateral infiltrates are similar to prior, right worse than left.     Impression: 1.No significant interval change. 2.Right-sided chest tube without evidence of pneumothorax. 3.Bilateral infiltrates, right worse than left. Electronically Signed: Cristian Tiwari MD  12/8/2024 6:11 AM EST  Workstation ID: [id number]    XR Chest 1 View    Result Date: 12/7/2024  XR CHEST 1 VW Date of Exam: 12/7/2024 8:11 PM EST Indication: CHECK CHEST TUBE PLACEMENT Comparison: 12/7/2024 at 0907 hours Findings: Feeding tube tip in the distal stomach. Endotracheal tube tip in good position in the lower trachea. Right chest tube remains in place. It has been pulled back slightly, but the side ports appear to be within the pleural space. There is extensive subcutaneous gas in the chest wall. Heart size is stable. Bilateral infiltrates, right greater than left, also are grossly stable. No definite pneumothorax.     Impression: 1.Right chest tube has been pulled back slightly, but the side ports appear to be within the pleural space. No definite pneumothorax. 2.Extensive subcutaneous gas in the chest wall. 3.Stable bilateral infiltrates. Electronically Signed: Cristian Tiwari MD  12/7/2024 8:31 PM EST  Workstation ID: [id number]    XR Chest 1  View    Result Date: 12/7/2024  XR CHEST 1 VW Date of Exam: 12/7/2024 9:22 AM EST Indication: follow up Comparison: December 6, 2024 Findings: Endotracheal tube has tip in the mid trachea. Diffuse bilateral subcutaneous emphysema appears slightly improved. Patchy airspace disease throughout the right lung appears unchanged. The heart and mediastinal contours appear stable. The osseous structures appear intact. No pneumothorax is identified. A right-sided chest tube is in place.     Impression: Impression: 1.Endotracheal tube in good position. 2.Right-sided chest tube in place. No pneumothorax identified. 3.Diffuse bilateral subcutaneous emphysema appears slightly improved. 4.Patchy airspace disease throughout right lung appears unchanged. Electronically Signed: Winston Reyes MD  12/7/2024 9:25 AM EST  Workstation ID: [id number]    XR Chest 1 View    Result Date: 12/6/2024  XR CHEST 1 VW Date of Exam: 12/6/2024 3:57 PM EST Indication: Desaturation Comparison: Chest radiograph dated 12/6/2024 Findings: The endotracheal tube tip is 3 cm above the mel. The cardiomediastinal silhouette is within normal limits. There is aortic arch atherosclerotic calcification. There is a right-sided chest tube in slightly different position. There is increased subcutaneous emphysema tracking throughout both sides of the chest, right worse than left. The subcutaneous emphysema limits assessment for pneumothorax. There is multifocal right-sided airspace disease which appears mildly improved from the prior examination. There is no definite left-sided airspace disease. There are degenerative changes of the thoracic spine.     Impression: Impression: 1.Endotracheal tube tip is 3 cm above the mel. 2.Slight change in position of the right-sided chest tube with increased subcutaneous emphysema tracking throughout both sides of the chest, right worse than left. Evaluation for pneumothorax is limited due to the subcutaneous emphysema.  3.Decreased right-sided airspace disease compared to prior exam. Electronically Signed: Reynaldo Rangel  12/6/2024 4:35 PM EST  Workstation ID: [id number]    XR Chest 1 View    Result Date: 12/6/2024  XR CHEST 1 VW Date of Exam: 12/6/2024 2:05 PM EST Indication: hypoxia Comparison: 12/6/2024 Findings: Interval retraction of the endotracheal tube which overlies the midthoracic trachea approximately 2.3 cm from the mel. Right approach thoracostomy tube overlies the right midlung, mildly retracted from prior exam. Unchanged cardiomediastinal silhouette. There are persistent multifocal airspace opacities throughout the right lung. Persistent right greater than left pleural effusions. Trace right apical pneumothorax. Increased subcutaneous emphysema within the right lateral chest wall. There is a mildly displaced right lateral seventh rib fracture. There are also mildly displaced left posterior second and third rib fractures. These are new from prior same day radiographs.     Impression: Impression: 1.Interval retraction of the endotracheal tube which now appears in more appropriate position overlying the mid thoracic trachea. 2.Mildly displaced right lateral seventh rib fracture. There are also mildly displaced left posterior second and third rib fractures. These are new from prior same day radiographs. 3.Persistent multifocal airspace opacities throughout the right lung with right greater than left pleural effusions. 4.Mild interval retraction of the right approach thoracostomy tube. Trace right apical pneumothorax. Increased right lateral chest wall emphysema. Electronically Signed: Devante Lang MD  12/6/2024 2:26 PM EST  Workstation ID: [id number]    XR Chest 1 View    Result Date: 12/6/2024  XR CHEST 1 VW Date of Exam: 12/6/2024 12:06 PM EST Indication: hypoxa Comparison: Chest radiograph 12/5/2024, chest CT [id number] Findings: Proximal right mainstem intubation. Retraction by 3.5 cm may place the ET tube in a more  standard position. Enlarged cardiomediastinal silhouette similar to prior. There is a right thoracotomy tube without appreciable pneumothorax. New right chest wall subcutaneous emphysema. There is a small to moderate size right pleural effusion similar to slightly improved from recent comparison. Suspect small layering left effusion again similar to may be slightly decreased allowing for technical differences. Worsening airspace consolidation in the right middle and lower upper lobe. Persistent right greater than left bibasilar airspace opacities. Aortic atherosclerotic disease. Degenerative related osseous change. Defibrillator pads noted     Impression: Impression: 1. Proximal right mainstem intubation. Retraction by 3.5 cm may place the ET tube in a more standard position. 2. Right thoracotomy tube without visible pneumothorax. Small volume right chest wall subcutaneous emphysema. 3. Multifocal airspace disease and/or atelectasis worsened in the right middle and upper lobes. Based on acuity this could reflect aspiration, hemorrhage or edema. 4. Right greater than left pleural effusions similar to may be slightly improved allowing for technical differences to prior chest radiograph. Findings communicated with ordering provider Vamshi Villafana via [employer] secure chat, message viewed at 12:13 p.m. 12/6/2024. Electronically Signed: Lorne Bustamante MD  12/6/2024 12:23 PM EST  Workstation ID: [id number]    XR Chest 1 View    Result Date: 12/6/2024  XR CHEST 1 VW Date of Exam: 12/6/2024 10:15 AM EST Indication: Status post right thoracentesis Comparison: 12/6/2024 CT and 12/5/2024 radiographs Findings: Unchanged cardiomediastinal silhouette. Low lung volumes with similar bilateral airspace opacities. There is mild improved aeration of the right lung base with decreased right-sided pleural effusion status post thoracentesis. Unchanged left-sided pleural  effusion. No pneumothorax. No acute osseous abnormality.      Impression: Impression: 1.Decreased right-sided pleural effusion status post thoracentesis. No pneumothorax. 2.Similar bilateral airspace opacities and small left pleural effusion. Electronically Signed: Devante Lang MD  12/6/2024 11:30 AM EST  Workstation ID: [id number]    XR Chest 1 View    Result Date: 12/5/2024  XR CHEST 1 VW Date of Exam: 12/5/2024 4:29 PM EST Indication: SOA Triage Protocol Comparison: 12/1/2024 Findings: Cardiomediastinal silhouette is enlarged, unchanged. Persistent pulmonary vascular congestion with interstitial edema, basal airspace disease and small to moderate bilateral pleural effusions. No pneumothorax. No acute osseous abnormality identified.     Impression: Impression: Similar moderate  CHF features. Electronically Signed: Jose Hammond MD  12/5/2024 4:35 PM EST  Workstation ID: [id number]     Results for orders placed during the hospital encounter of 11/01/24    Adult Transthoracic Echo Complete W/ Cont if Necessary Per Protocol    Interpretation Summary    Left ventricular systolic function is normal. Left ventricular ejection fraction appears to be 56 - 60%.    Left ventricular diastolic function was indeterminate.    The right ventricular cavity is mildly dilated. Normal right ventricular systolic function noted.    : The left atrial cavity is mildly dilated.    The aortic valve leaflets are mildly to moderately calcified    There is moderate mitral annular calcification    Insufficient TR velocity profile to estimate the right ventricular systolic pressure.    There is no evidence of pericardial effusion        Assessment & Plan   Assessment / Plan     Brief Patient Summary:  Nelson Wang is a 79 y.o. male   Status post cardiac arrest  Hypoxic arrest  Hemoptysis  Concern for intraparenchymal bleed   Status post thoracentesis  Candidemia  Acute on chronic hypoxic respiratory failure  End-stage renal disease on dialysis leading to heart failure  Acute diastolic heart failure  with EF of 56-60  Discitis on vancomycin  Type 2 diabetes  A-fib on apixaban    Active Hospital Problems:  Active Hospital Problems    Diagnosis     **Hypoxia      Plan:   Vent settings at AC VC 24/450/PEEP of 5/FiO2 keep saturation more than 90%.  Started pressure support and tolerating well.  Off all sedatives.  We will consider extubation after bronchoscopy if stable today.  Wean Levophed drip.  Continue vasopressin drip  Stop CRRT.  Consider intermittent hemodialysis and fluid removal tomorrow.  Right-sided chest tube in place.  Continue to suction.  Continue with IV vancomycin and Zosyn.  Continue micafungin for Candida glabrata infection  Moved left femoral central line yesterday.  Nebulized TXA x 3 doses obtained this morning.  Hemoglobin stable, now at 9.3 g/dL.  Continue with Brovana Pulmicort and add DuoNeb.  Bronchoscopy cultures negative so far.  Patient is DNR now.  Okay with reintubation if need of ventilator postextubation today.  Discussed with his daughter.      VTE Prophylaxis:  Pharmacologic & mechanical VTE prophylaxis orders are present.        CODE STATUS:   Level Of Support Discussed With: Next of Kin (If No Surrogate)  Code Status (Patient has no pulse and is not breathing): No CPR (Do Not Attempt to Resuscitate)  Medical Interventions (Patient has pulse or is breathing): Full Support      Patient is critically ill in ICU with status post cardiac arrest, hypoxic arrest, hemoptysis, large blood clots, likely intraparenchymal bleed, shock, ESRD.  I spent 34 minutes of critical care time excluding any procedure notes, spent in review, analysis, obtaining history and physical, formulating care plan, and I led multi-disciplinary critical care rounds with bedside nurse, respiratory therapist, clinical pharmacist and other allied services. I have discussed the case with primary service and other consultants as well.       Electronically signed by Preston Jimenez MD, 12/8/2024, 12:05 EST.

## 2024-12-08 NOTE — PROGRESS NOTES
Nutrition Services    Patient Name: Nelson Wang  YOB: 1945  MRN: [id number]  Admission date: 12/5/2024    PROGRESS NOTE      Encounter Information: EN Follow Up       PO Diet: NPO Diet NPO Type: Strict NPO   PO Supplements:    PO Intake:         Current nutrition support: NPO       Estimated Needs: 4180-9609 kcal, 106-141 g (1.5-2 g/70.7 kg CBW, HDU), Fluids per critical care/Renal MD       Nutrition support review: ICU attending requested EN recommendations.   CRRT held, transition back to HDU.  Goal to wean pressors- Levophed, Vasopressin. MAP 82 (goal >65).       Labs (reviewed below): Lytes stable, renal indices/glucose elevated          GI Function:         Nutrition Intervention Updates: Recommend start Novasource Renal @ 10 mL/hr. Do not advance.   Water flush per ICU/Renal.  RD will assess 12/9.    EN goal:  Novasource Renal 40 mL/hr x 22 hr (880 mL).   Water flush 60 ml q 4 hr (360 mL).  Prosource No carb BID (120 kcal, 30 g protein).    Provides: 1880 kcal, 110 g protein, 985 mL/day (625 mL FW+ 360 mL flushes).     Results from last 7 days   Lab Units 12/08/24  0410 12/07/24  2346 12/07/24  1822 12/07/24  1154 12/07/24  0606 12/06/24  1238 12/06/24  0748 12/05/24  1635   SODIUM mmol/L 136 136 137   < > 136  136   < > 134* 137   POTASSIUM mmol/L 4.4 4.6 4.6   < > 4.4  4.4   < > 4.1 3.9   CHLORIDE mmol/L 102 104 103   < > 103  103   < > 95* 97*   CO2 mmol/L 19.5* 19.7* 21.1*   < > 22.1  22.1   < > 28.3 30.6*   BUN mg/dL 13 10 9   < > 9  9   < > 15 12   CREATININE mg/dL 1.45* 1.39* 1.37*   < > 1.81*  1.81*   < > 4.53* 3.59*   CALCIUM mg/dL 8.2* 8.3* 8.6   < > 8.3*  8.3*   < > 9.1 9.4   BILIRUBIN mg/dL  --   --   --   --  1.2  --  0.5 0.6   ALK PHOS U/L  --   --   --   --  63  --  69 72   ALT (SGPT) U/L  --   --   --   --  47*  --  9 10   AST (SGOT) U/L  --   --   --   --  138*  --  28 27   GLUCOSE mg/dL 142* 138* 83   < > 75  75   < > 86 107*    < > = values in this interval not  displayed.     Results from last 7 days   Lab Units 12/07/24  2346 12/07/24  1154 12/07/24  0606 12/07/24  0007 12/06/24  1238   MAGNESIUM mg/dL 2.3  --  2.1  --  1.9   PHOSPHORUS mg/dL 4.4   < > 3.5   < > 5.7*   HEMOGLOBIN g/dL 9.3*  --  9.3*   < > 6.9*   HEMATOCRIT % 30.7*  --  30.8*   < > 24.6*    < > = values in this interval not displayed.     COVID19   Date Value Ref Range Status   10/08/2024 Not Detected Not Detected - Ref. Range Final     Lab Results   Component Value Date    HGBA1C 5.90 (H) 11/09/2024       RD to follow up per protocol.    Electronically signed by:  Bárbara Gagnon RD  12/08/24 07:10 EST

## 2024-12-08 NOTE — NURSING NOTE
Pt began coughing and having high peak pressures, inline suctioned noted return of clots.  RT notified concerns of plugging.  RT to bedside and lavaged patient several times and was able to clear a large bloody clot plug with inline. PT VSS stable and peak pressures improved pt resting comfortable after this.

## 2024-12-08 NOTE — PLAN OF CARE
"Goal Outcome Evaluation:      CRRT noted to have what appeared to be a \"clot\" in deaeration chamber @0230.  2nd RN to bedside to assess.  Spoke with [employer], suggested to go ahead and take set down.  Spoke with Nephrology ok to keep CRRT down since will be transitioning back to HDU.  Unable to wean levophed to maintain map > 65.  Chest tube output was 10 ml.  Tolerating trickle feeds.  SBT started @ 0500.                                       "

## 2024-12-08 NOTE — PROGRESS NOTES
[facility]   Hospitalist Progress Note  Date: 2024  Patient Name: Nelson Wang  : 1945  MRN: [id number]  Date of admission: 2024  Room/Bed: [facility]      Subjective   Subjective     Chief Complaint: Shortness of breath    Summary:    Nelson Wang is a 79 y.o. 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  Patient was started on supplemental oxygen to maintain his oxygen saturations.  He was given a dose of Bumex overnight.  His chest x-ray was consistent with bilateral pleural effusions.  Pulmonology was consulted for the bilateral pleural effusions and and nephrology consulted for possible volume overload.  Pulmonology was performing a thoracentesis this morning.  Thoracentesis was showing bloody fluid.  Patient suffered CODE BLUE. Likely bleeding into his airways causing hypoxic arrest.  He received CPR for 6 minutes and achieved ROSC.  He was transferred to the ICU, intubated and on blood pressure support.  Bronchoscopy was showing blood clots in his airways.  Patient was started on CRRT.  Patient's medical status is tenuous.  He is currently on 2 pressors though his pressor requirement is decreasing.  CRRT is removing fluid at a slow rate.  Patient started on micafungin on 2024.  Patient extubated on 2024.  Patient currently on BiPAP.  Patient off sedation.    Interval Followup:     Patient is extubated.  Patient is off of sedation.  Patient is spontaneously moving.  Daughter and wife are at bedside and were updated.          Objective   Objective     Vitals:   Temp:  [97.3 °F (36.3 °C)-100.6 °F (38.1 °C)] 100 °F (37.8 °C)  Heart Rate:  [76-95] 80  Resp:  [13-20] 13  BP: (125-164)/(46-63) 131/61  Arterial Line BP: ()/() 114/47  FiO2 (%):  [35 %-40 %] 35 %    Physical Exam   General: no acute distress, on  bipap  Cardiovascular: RRR, no murmurs   Pulmonary: coarse breath sounds  Musculoskeletal: No swelling    Result Review    Result Review:  I have personally reviewed these results:  [x]  Laboratory      Lab 12/07/24  2346 12/07/24  0912 12/07/24  0606 12/07/24  0245 12/07/24  0007 12/06/24  1248 12/06/24  1238 12/06/24  1206 12/06/24  0748   WBC 6.95  --  6.28  --   --   --  5.95  --  3.73   HEMOGLOBIN 9.3*  --  9.3* 9.3*  --   --  6.9*  --  8.1*   HEMATOCRIT 30.7*  --  30.8* 30.5*  --   --  24.6*  --  28.2*   PLATELETS 143  --  109*  --   --   --  136*  --  141   NEUTROS ABS 5.36  --  4.77  --   --   --  1.40*  --  1.66*   IMMATURE GRANS (ABS) 0.05  --  0.04  --   --   --  0.10*  --  0.01   LYMPHS ABS 0.84  --  0.86  --   --   --  3.88*  --  1.41   MONOS ABS 0.66  --  0.58  --   --   --  0.48  --  0.53   EOS ABS 0.01  --  0.01  --   --   --  0.07  --  0.09   MCV 90.0  --  89.3  --   --   --  96.5  --  94.9   PROCALCITONIN  --   --   --   --   --   --   --   --  0.27*   LACTATE 1.9 1.3  --   --  1.8   < > 3.9*   < >  --    PROTIME  --   --   --   --   --   --  15.2*  --   --    APTT  --   --   --   --   --   --  31.1  --   --     < > = values in this interval not displayed.         Lab 12/08/24  0410 12/07/24  2346 12/07/24  1822 12/07/24  1154 12/07/24  0606   SODIUM 136 136 137   < > 136  136   POTASSIUM 4.4 4.6 4.6   < > 4.4  4.4   CHLORIDE 102 104 103   < > 103  103   CO2 19.5* 19.7* 21.1*   < > 22.1  22.1   ANION GAP 14.5 12.3 12.9   < > 10.9  10.9   BUN 13 10 9   < > 9  9   CREATININE 1.45* 1.39* 1.37*   < > 1.81*  1.81*   EGFR 49.3* 51.9* 52.8*   < > 37.8*  37.8*   GLUCOSE 142* 138* 83   < > 75  75   CALCIUM 8.2* 8.3* 8.6   < > 8.3*  8.3*   MAGNESIUM 2.3 2.3  --   --  2.1   PHOSPHORUS 3.5 4.4 4.6*   < > 3.5    < > = values in this interval not displayed.         Lab 12/07/24  2346 12/07/24  1822 12/07/24  1154 12/07/24  0606 12/06/24  1238 12/06/24  0748 12/05/24  1635   TOTAL PROTEIN  --   --   --   6.4  --  7.0 7.4   ALBUMIN 2.8* 3.0* 2.8* 2.8*  2.8*   < > 3.0* 3.3*   GLOBULIN  --   --   --  3.6  --  4.0 4.1   ALT (SGPT)  --   --   --  47*  --  9 10   AST (SGOT)  --   --   --  138*  --  28 27   BILIRUBIN  --   --   --  1.2  --  0.5 0.6   ALK PHOS  --   --   --  63  --  69 72    < > = values in this interval not displayed.         Lab 12/06/24  1238 12/06/24  0748 12/05/24  1854 12/05/24  1635   PROBNP  --   --   --  59,503.0*   HSTROP T  --  128* 113* 103*   PROTIME 15.2*  --   --   --    INR 1.17*  --   --   --              Lab 12/06/24  1507   ABO TYPING AB   RH TYPING Positive   ANTIBODY SCREEN Negative         Lab 12/08/24  0700 12/07/24  0912 12/06/24  1409   PH, ARTERIAL 7.309* 7.376 7.343*   PCO2, ARTERIAL 41.1 32.8* 59.7*   PO2 ART 69.4* 91.0 109.7*   O2 SATURATION ART 91.9* 97.0 97.8   FIO2 35 40 50   HCO3 ART 20.6* 19.2* 32.4*   BASE EXCESS ART -5.3* -5.3* 5.0*     Brief Urine Lab Results  (Last result in the past 365 days)        Color   Clarity   Blood   Leuk Est   Nitrite   Protein   CREAT   Urine HCG        10/27/24 1417 Yellow   Cloudy   Negative   Trace   Negative   >=300 mg/dL (3+)                 [x]  Microbiology   Microbiology Results (last 10 days)       Procedure Component Value - Date/Time    Blood Culture - Blood, Blood, Central Line [[id number]]  (Normal) Collected: 12/07/24 1023    Lab Status: Preliminary result Specimen: Blood, Central Line Updated: 12/08/24 1030     Blood Culture No growth at 24 hours    Blood Culture - Blood, Blood, Arterial Line [[id number]]  (Normal) Collected: 12/07/24 1023    Lab Status: Preliminary result Specimen: Blood, Arterial Line Updated: 12/08/24 1045     Blood Culture No growth at 24 hours    Pneumonia Panel - Lavage, Lung, Right Lower Lobe [[id number]]  (Normal) Collected: 12/06/24 1719    Lab Status: Final result Specimen: Lavage from Lung, Right Lower Lobe Updated: 12/06/24 9969     Escherichia coli PCR Not Detected     Acinetobacter  calcoaceticus-baumannii complex PCR Not Detected     Enterobacter cloacae PCR Not Detected     Klebsiella oxytoca PCR Not Detected     Klebsiella pneumoniae group PCR Not Detected     Klebsiella aerogenes PCR Not Detected     Moraxella catarrhalis PCR Not Detected     Proteus species PCR Not Detected     Pseudomonas aeroginosa PCR Not Detected     Serratia marcescens PCR Not Detected     Staphylococcus aureus PCR Not Detected     Streptococcus pyogenes PCR Not Detected     Haemophilus influenzae PCR Not Detected     Streptococcus agalactiae PCR Not Detected     Streptococcus pneumoniae PCR Not Detected     Chlamydophila pneumoniae PCR Not Detected     Legionella pneumophilia PCR Not Detected     Mycoplasma pneumo by PCR Not Detected     ADENOVIRUS, PCR Not Detected     CTX-M Gene N/A     IMP Gene N/A     KPC Gene N/A     mecA/C and MREJ Gene N/A     NDM Gene N/A     OXA-48-like Gene N/A     VIM Gene N/A     Coronavirus Not Detected     Human Metapneumovirus Not Detected     Human Rhinovirus/Enterovirus Not Detected     Influenza A PCR Not Detected     Influenza B PCR Not Detected     RSV, PCR Not Detected     Parainfluenza virus PCR Not Detected    BAL Culture, Quantitative - Lavage, Bronchus [[id number]] Collected: 12/06/24 1719    Lab Status: Final result Specimen: Lavage from Bronchus Updated: 12/08/24 1013     BAL Culture >100,000 CFU/mL Normal respiratory lashae. No S. aureus or Pseudomonas aeruginosa detected. Final report.     Gram Stain No organisms seen    AFB Culture - Body Fluid, Pleural Cavity [[id number]] Collected: 12/06/24 1233    Lab Status: Preliminary result Specimen: Body Fluid from Pleural Cavity Updated: 12/07/24 1217     AFB Stain Few (2+) WBCs seen      No organisms seen      No acid fast bacilli seen on direct smear    Body Fluid Culture - Body Fluid, Pleural Cavity [[id number]] Collected: 12/06/24 1233    Lab Status: Preliminary result Specimen: Body Fluid from Pleural Cavity Updated: 12/08/24  0917     Body Fluid Culture No growth at 2 days     Gram Stain Few (2+) WBCs seen      No organisms seen    Blood Culture - Blood, Arm, Left [[id number]]  (Abnormal) Collected: 12/05/24 1755    Lab Status: Preliminary result Specimen: Blood from Arm, Left Updated: 12/08/24 0702     Blood Culture Staphylococcus, coagulase negative     Isolated from Aerobic Bottle     Gram Stain Aerobic Bottle Gram positive cocci in clusters      Anaerobic Bottle Gram positive cocci in chains    Blood Culture - Blood, Arm, Left [[id number]]  (Abnormal) Collected: 12/05/24 1755    Lab Status: Preliminary result Specimen: Blood from Arm, Left Updated: 12/08/24 0704     Blood Culture Staphylococcus, coagulase negative     Isolated from Aerobic and Anaerobic Bottles     Gram Stain Aerobic Bottle Gram positive cocci in clusters      Anaerobic Bottle Gram positive cocci in chains and clusters    Blood Culture ID, PCR - Blood, Arm, Left [[id number]]  (Abnormal) Collected: 12/05/24 1755    Lab Status: Final result Specimen: Blood from Arm, Left Updated: 12/06/24 1617     BCID, PCR Staph spp, not aureus or lugdunensis. Identification by BCID2 PCR.     BCID, PCR 2 Streptococcus spp, not A, B, or pneumoniae. Identification by BCID2 PCR.     BOTTLE TYPE Aerobic Bottle    Narrative:      Staph Sp mecA/C    Blood Culture ID, PCR - Blood, Arm, Left [[id number]]  (Abnormal) Collected: 12/05/24 1755    Lab Status: Final result Specimen: Blood from Arm, Left Updated: 12/07/24 0943     BCID, PCR Staph spp, not aureus or lugdunensis. Identification by BCID2 PCR.     BCID, PCR 2 Streptococcus spp, not A, B, or pneumoniae. Identification by BCID2 PCR.     BOTTLE TYPE Anaerobic Bottle    Narrative:      S.epi with mecA/C    Blood Culture ID, PCR - Blood, Arm, Left [[id number]]  (Abnormal) Collected: 12/05/24 1755    Lab Status: Final result Specimen: Blood from Arm, Left Updated: 12/07/24 0913     BCID, PCR Streptococcus spp, not A, B, or pneumoniae.  Identification by BCID2 PCR.     BCID, PCR 2 Candida glabrata. Identification by BCID2 PCR.     BOTTLE TYPE Anaerobic Bottle    Narrative:      Infectious disease consultation is highly recommended to rule out distant foci of infection.          [x]  Radiology  XR Chest 1 View    Result Date: 12/8/2024  1.No significant interval change. 2.Right-sided chest tube without evidence of pneumothorax. 3.Bilateral infiltrates, right worse than left. Electronically Signed: Cristian Tiwari MD  12/8/2024 6:11 AM EST  Workstation ID: [id number]    XR Chest 1 View    Result Date: 12/7/2024  1.Right chest tube has been pulled back slightly, but the side ports appear to be within the pleural space. No definite pneumothorax. 2.Extensive subcutaneous gas in the chest wall. 3.Stable bilateral infiltrates. Electronically Signed: Cristian Tiwari MD  12/7/2024 8:31 PM EST  Workstation ID: [id number]    XR Abdomen KUB    Result Date: 12/7/2024  Impression: Weighted tip feeding catheter projects over the stomach. Electronically Signed: Claus Sánchez MD  12/7/2024 1:08 PM EST  Workstation ID: [id number]    XR Chest 1 View    Result Date: 12/7/2024  Impression: 1.Endotracheal tube in good position. 2.Right-sided chest tube in place. No pneumothorax identified. 3.Diffuse bilateral subcutaneous emphysema appears slightly improved. 4.Patchy airspace disease throughout right lung appears unchanged. Electronically Signed: Winston Reyes MD  12/7/2024 9:25 AM EST  Workstation ID: [id number]    XR Chest 1 View    Result Date: 12/6/2024  Impression: 1.Endotracheal tube tip is 3 cm above the mel. 2.Slight change in position of the right-sided chest tube with increased subcutaneous emphysema tracking throughout both sides of the chest, right worse than left. Evaluation for pneumothorax is limited due to the subcutaneous emphysema. 3.Decreased right-sided airspace disease compared to prior exam. Electronically Signed: Reynaldo Rangel  12/6/2024 4:35 PM  EST  Workstation ID: [id number]    XR Chest 1 View    Result Date: 12/6/2024  Impression: 1.Interval retraction of the endotracheal tube which now appears in more appropriate position overlying the mid thoracic trachea. 2.Mildly displaced right lateral seventh rib fracture. There are also mildly displaced left posterior second and third rib fractures. These are new from prior same day radiographs. 3.Persistent multifocal airspace opacities throughout the right lung with right greater than left pleural effusions. 4.Mild interval retraction of the right approach thoracostomy tube. Trace right apical pneumothorax. Increased right lateral chest wall emphysema. Electronically Signed: Devante Lang MD  12/6/2024 2:26 PM EST  Workstation ID: [id number]    XR Chest 1 View    Result Date: 12/6/2024  Impression: 1. Proximal right mainstem intubation. Retraction by 3.5 cm may place the ET tube in a more standard position. 2. Right thoracotomy tube without visible pneumothorax. Small volume right chest wall subcutaneous emphysema. 3. Multifocal airspace disease and/or atelectasis worsened in the right middle and upper lobes. Based on acuity this could reflect aspiration, hemorrhage or edema. 4. Right greater than left pleural effusions similar to may be slightly improved allowing for technical differences to prior chest radiograph. Findings communicated with ordering provider Vamshi Villafana via [employer] secure chat, message viewed at 12:13 p.m. 12/6/2024. Electronically Signed: Lorne Bustamante MD  12/6/2024 12:23 PM EST  Workstation ID: [id number]    XR Chest 1 View    Result Date: 12/6/2024  Impression: 1.Decreased right-sided pleural effusion status post thoracentesis. No pneumothorax. 2.Similar bilateral airspace opacities and small left pleural effusion. Electronically Signed: Devante Lang MD  12/6/2024 11:30 AM EST  Workstation ID: [id number]    CT Chest Without Contrast Diagnostic    Result Date: 12/6/2024  Impression: 1.Moderate  right and small left pleural effusions with bibasilar consolidations, which could represent atelectasis, aspiration, or pneumonia. 2.Interlobular septal thickening bilaterally, suggesting mild pulmonary edema. 3.Cardiomegaly. 4.Erosive changes at T5-6 appear similar, suggesting ongoing discitis/osteomyelitis. Electronically Signed: Winston Reyes MD  12/6/2024 8:44 AM EST  Workstation ID: [id number]    XR Chest 1 View    Result Date: 12/5/2024  Impression: Similar moderate  CHF features. Electronically Signed: Jose Hammond MD  12/5/2024 4:35 PM EST  Workstation ID: [id number]    CT Head Without Contrast    Result Date: 12/1/2024  Impression: 1. Small hematoma within the scalp overlying the right parietal bone at the vertex. No underlying skull fracture. 2. No acute intracranial abnormality. 3. Partial opacification of the right mastoid air cells unchanged from 11/15/2024 Electronically Signed: Stephan Dey MD  12/1/2024 6:27 PM EST  Workstation ID: [id number]    XR Chest 1 View    Result Date: 12/1/2024  Impression: 1. Low volume inspiration with bibasilar and right perihilar airspace disease which may be secondary to pulmonary edema or less likely pneumonia. 2. New small right pleural effusion. Electronically Signed: Stephan Dey MD  12/1/2024 5:33 PM EST  Workstation ID: [id number]   []  EKG/Telemetry   []  Cardiology/Vascular   []  Pathology  []  Old records  []  Other:    Assessment & Plan   Assessment / Plan     Neuro:  Patient is off sedation  Continue to monitor neurologic status     CV:  #Status postcardiac arrest  #Undifferentiated shock  Continue levophed  Continue vasopressin  Continue midodrine  MAP goal 65  Wean as tolerated     #A-fib  Holding apixaban in setting of bleed     Resp:  #Hypoxic respiratory failure-improving  #Hemoptysis  Continue BiPAP  Continue Brovana and Pulmicort  Pulmonology/ICU team following, appreciate recs  Wean BiPAP as tolerated     # Concern for empyema  Continue  Zosyn  Continue micafungin     Renal:  #End-stage renal disease  Nephrology consulted appreciate recs  Continue CRRT     GI:  Start trickle feed     Endo:  # Type 2 diabetes  Continue sliding scale insulin    #Hypothyroidism  Continue levothyroxine     ID:  #Discitis/osteomyelitis  Pharmacy to dose vancomycin    Rest of care per ICU team     Discussed with RN.    VTE Prophylaxis:  Pharmacologic & mechanical VTE prophylaxis orders are present.        CODE STATUS:   Level Of Support Discussed With: Next of Kin (If No Surrogate)  Code Status (Patient has no pulse and is not breathing): No CPR (Do Not Attempt to Resuscitate)  Medical Interventions (Patient has pulse or is breathing): Full Support      Electronically signed by Wilfred Villafana MD, 12/8/2024, 14:09 EST.

## 2024-12-09 ENCOUNTER — APPOINTMENT (OUTPATIENT)
Dept: GENERAL RADIOLOGY | Facility: HOSPITAL | Age: 79
End: 2024-12-09
Payer: MEDICARE

## 2024-12-09 ENCOUNTER — APPOINTMENT (OUTPATIENT)
Dept: CARDIOLOGY | Facility: HOSPITAL | Age: 79
End: 2024-12-09
Payer: MEDICARE

## 2024-12-09 LAB
ALBUMIN SERPL-MCNC: 2.8 G/DL (ref 3.5–5.2)
ALBUMIN SERPL-MCNC: 2.9 G/DL (ref 3.5–5.2)
ALBUMIN SERPL-MCNC: 3 G/DL (ref 3.5–5.2)
ALBUMIN SERPL-MCNC: 3 G/DL (ref 3.5–5.2)
ALBUMIN SERPL-MCNC: 3.2 G/DL (ref 3.5–5.2)
ANION GAP SERPL CALCULATED.3IONS-SCNC: 10.9 MMOL/L (ref 5–15)
ANION GAP SERPL CALCULATED.3IONS-SCNC: 13.1 MMOL/L (ref 5–15)
ANION GAP SERPL CALCULATED.3IONS-SCNC: 13.3 MMOL/L (ref 5–15)
ANION GAP SERPL CALCULATED.3IONS-SCNC: 7.5 MMOL/L (ref 5–15)
ANION GAP SERPL CALCULATED.3IONS-SCNC: 9.7 MMOL/L (ref 5–15)
BACTERIA FLD CULT: NORMAL
BASOPHILS # BLD AUTO: 0.04 10*3/MM3 (ref 0–0.2)
BASOPHILS NFR BLD AUTO: 0.8 % (ref 0–1.5)
BUN SERPL-MCNC: 18 MG/DL (ref 8–23)
BUN SERPL-MCNC: 19 MG/DL (ref 8–23)
BUN SERPL-MCNC: 20 MG/DL (ref 8–23)
BUN SERPL-MCNC: 21 MG/DL (ref 8–23)
BUN SERPL-MCNC: 22 MG/DL (ref 8–23)
BUN/CREAT SERPL: 10 (ref 7–25)
BUN/CREAT SERPL: 10.4 (ref 7–25)
BUN/CREAT SERPL: 10.5 (ref 7–25)
BUN/CREAT SERPL: 9.3 (ref 7–25)
BUN/CREAT SERPL: 9.5 (ref 7–25)
CA-I BLDA-SCNC: 1.14 MMOL/L (ref 1.13–1.32)
CA-I BLDA-SCNC: 1.14 MMOL/L (ref 1.13–1.32)
CA-I BLDA-SCNC: 1.21 MMOL/L (ref 1.13–1.32)
CA-I BLDA-SCNC: 1.27 MMOL/L (ref 1.13–1.32)
CA-I BLDA-SCNC: 1.3 MMOL/L (ref 1.13–1.32)
CALCIUM SPEC-SCNC: 7.7 MG/DL (ref 8.6–10.5)
CALCIUM SPEC-SCNC: 8.4 MG/DL (ref 8.6–10.5)
CALCIUM SPEC-SCNC: 8.5 MG/DL (ref 8.6–10.5)
CALCIUM SPEC-SCNC: 8.7 MG/DL (ref 8.6–10.5)
CALCIUM SPEC-SCNC: 9.1 MG/DL (ref 8.6–10.5)
CHLORIDE SERPL-SCNC: 102 MMOL/L (ref 98–107)
CHLORIDE SERPL-SCNC: 106 MMOL/L (ref 98–107)
CHLORIDE SERPL-SCNC: 107 MMOL/L (ref 98–107)
CHLORIDE SERPL-SCNC: 107 MMOL/L (ref 98–107)
CHLORIDE SERPL-SCNC: 112 MMOL/L (ref 98–107)
CO2 SERPL-SCNC: 18.5 MMOL/L (ref 22–29)
CO2 SERPL-SCNC: 20.7 MMOL/L (ref 22–29)
CO2 SERPL-SCNC: 20.9 MMOL/L (ref 22–29)
CO2 SERPL-SCNC: 21.1 MMOL/L (ref 22–29)
CO2 SERPL-SCNC: 22.3 MMOL/L (ref 22–29)
CREAT SERPL-MCNC: 1.71 MG/DL (ref 0.76–1.27)
CREAT SERPL-MCNC: 1.82 MG/DL (ref 0.76–1.27)
CREAT SERPL-MCNC: 2.09 MG/DL (ref 0.76–1.27)
CREAT SERPL-MCNC: 2.16 MG/DL (ref 0.76–1.27)
CREAT SERPL-MCNC: 2.31 MG/DL (ref 0.76–1.27)
CYTO UR: NORMAL
D-LACTATE SERPL-SCNC: 1.1 MMOL/L (ref 0.5–2)
DEPRECATED RDW RBC AUTO: 66.4 FL (ref 37–54)
EGFRCR SERPLBLD CKD-EPI 2021: 28.2 ML/MIN/1.73
EGFRCR SERPLBLD CKD-EPI 2021: 30.6 ML/MIN/1.73
EGFRCR SERPLBLD CKD-EPI 2021: 31.8 ML/MIN/1.73
EGFRCR SERPLBLD CKD-EPI 2021: 37.6 ML/MIN/1.73
EGFRCR SERPLBLD CKD-EPI 2021: 40.5 ML/MIN/1.73
EOSINOPHIL # BLD AUTO: 0.03 10*3/MM3 (ref 0–0.4)
EOSINOPHIL NFR BLD AUTO: 0.6 % (ref 0.3–6.2)
ERYTHROCYTE [DISTWIDTH] IN BLOOD BY AUTOMATED COUNT: 19.8 % (ref 12.3–15.4)
GLUCOSE BLDC GLUCOMTR-MCNC: 136 MG/DL (ref 70–99)
GLUCOSE BLDC GLUCOMTR-MCNC: 140 MG/DL (ref 70–99)
GLUCOSE BLDC GLUCOMTR-MCNC: 144 MG/DL (ref 70–99)
GLUCOSE BLDC GLUCOMTR-MCNC: 160 MG/DL (ref 70–99)
GLUCOSE SERPL-MCNC: 121 MG/DL (ref 65–99)
GLUCOSE SERPL-MCNC: 136 MG/DL (ref 65–99)
GLUCOSE SERPL-MCNC: 146 MG/DL (ref 65–99)
GLUCOSE SERPL-MCNC: 148 MG/DL (ref 65–99)
GLUCOSE SERPL-MCNC: 160 MG/DL (ref 65–99)
GRAM STN SPEC: NORMAL
GRAM STN SPEC: NORMAL
HCT VFR BLD AUTO: 29.5 % (ref 37.5–51)
HGB BLD-MCNC: 8.8 G/DL (ref 13–17.7)
IMM GRANULOCYTES # BLD AUTO: 0.03 10*3/MM3 (ref 0–0.05)
IMM GRANULOCYTES NFR BLD AUTO: 0.6 % (ref 0–0.5)
LAB AP CASE REPORT: NORMAL
LAB AP CLINICAL INFORMATION: NORMAL
LAB AP DIAGNOSIS COMMENT: NORMAL
LYMPHOCYTES # BLD AUTO: 0.76 10*3/MM3 (ref 0.7–3.1)
LYMPHOCYTES NFR BLD AUTO: 14.5 % (ref 19.6–45.3)
Lab: NORMAL
MAGNESIUM SERPL-MCNC: 2.6 MG/DL (ref 1.6–2.4)
MCH RBC QN AUTO: 27.3 PG (ref 26.6–33)
MCHC RBC AUTO-ENTMCNC: 29.8 G/DL (ref 31.5–35.7)
MCV RBC AUTO: 91.6 FL (ref 79–97)
MONOCYTES # BLD AUTO: 0.55 10*3/MM3 (ref 0.1–0.9)
MONOCYTES NFR BLD AUTO: 10.5 % (ref 5–12)
NEUTROPHILS NFR BLD AUTO: 3.82 10*3/MM3 (ref 1.7–7)
NEUTROPHILS NFR BLD AUTO: 73 % (ref 42.7–76)
NRBC BLD AUTO-RTO: 0 /100 WBC (ref 0–0.2)
PATH REPORT.FINAL DX SPEC: NORMAL
PATH REPORT.GROSS SPEC: NORMAL
PHOSPHATE SERPL-MCNC: 3 MG/DL (ref 2.5–4.5)
PHOSPHATE SERPL-MCNC: 3 MG/DL (ref 2.5–4.5)
PHOSPHATE SERPL-MCNC: 3.4 MG/DL (ref 2.5–4.5)
PHOSPHATE SERPL-MCNC: 4 MG/DL (ref 2.5–4.5)
PHOSPHATE SERPL-MCNC: 4 MG/DL (ref 2.5–4.5)
PLATELET # BLD AUTO: 115 10*3/MM3 (ref 140–450)
PMV BLD AUTO: 10.2 FL (ref 6–12)
POTASSIUM SERPL-SCNC: 3.7 MMOL/L (ref 3.5–5.2)
POTASSIUM SERPL-SCNC: 3.9 MMOL/L (ref 3.5–5.2)
POTASSIUM SERPL-SCNC: 4.2 MMOL/L (ref 3.5–5.2)
POTASSIUM SERPL-SCNC: 4.4 MMOL/L (ref 3.5–5.2)
POTASSIUM SERPL-SCNC: 4.4 MMOL/L (ref 3.5–5.2)
RBC # BLD AUTO: 3.22 10*6/MM3 (ref 4.14–5.8)
SODIUM SERPL-SCNC: 136 MMOL/L (ref 136–145)
SODIUM SERPL-SCNC: 138 MMOL/L (ref 136–145)
SODIUM SERPL-SCNC: 139 MMOL/L (ref 136–145)
SODIUM SERPL-SCNC: 139 MMOL/L (ref 136–145)
SODIUM SERPL-SCNC: 140 MMOL/L (ref 136–145)
VANCOMYCIN SERPL-MCNC: 18.24 MCG/ML (ref 5–40)
VANCOMYCIN TROUGH SERPL-MCNC: 17.48 MCG/ML (ref 5–20)
WBC NRBC COR # BLD AUTO: 5.23 10*3/MM3 (ref 3.4–10.8)

## 2024-12-09 PROCEDURE — 93306 TTE W/DOPPLER COMPLETE: CPT | Performed by: INTERNAL MEDICINE

## 2024-12-09 PROCEDURE — 94799 UNLISTED PULMONARY SVC/PX: CPT

## 2024-12-09 PROCEDURE — 83605 ASSAY OF LACTIC ACID: CPT | Performed by: INTERNAL MEDICINE

## 2024-12-09 PROCEDURE — 99232 SBSQ HOSP IP/OBS MODERATE 35: CPT | Performed by: STUDENT IN AN ORGANIZED HEALTH CARE EDUCATION/TRAINING PROGRAM

## 2024-12-09 PROCEDURE — 25010000002 VANCOMYCIN 5 G RECONSTITUTED SOLUTION: Performed by: STUDENT IN AN ORGANIZED HEALTH CARE EDUCATION/TRAINING PROGRAM

## 2024-12-09 PROCEDURE — 82948 REAGENT STRIP/BLOOD GLUCOSE: CPT

## 2024-12-09 PROCEDURE — 82330 ASSAY OF CALCIUM: CPT

## 2024-12-09 PROCEDURE — 25010000002 LEVETRIRACETAM PER 10 MG: Performed by: INTERNAL MEDICINE

## 2024-12-09 PROCEDURE — 25010000002 MICAFUNGIN SODIUM 100 MG RECONSTITUTED SOLUTION 1 EACH VIAL: Performed by: INTERNAL MEDICINE

## 2024-12-09 PROCEDURE — 80202 ASSAY OF VANCOMYCIN: CPT | Performed by: NURSE PRACTITIONER

## 2024-12-09 PROCEDURE — 80202 ASSAY OF VANCOMYCIN: CPT | Performed by: INTERNAL MEDICINE

## 2024-12-09 PROCEDURE — 93306 TTE W/DOPPLER COMPLETE: CPT

## 2024-12-09 PROCEDURE — 71045 X-RAY EXAM CHEST 1 VIEW: CPT

## 2024-12-09 PROCEDURE — 25010000002 SULFUR HEXAFLUORIDE MICROSPH 60.7-25 MG RECONSTITUTED SUSPENSION: Performed by: STUDENT IN AN ORGANIZED HEALTH CARE EDUCATION/TRAINING PROGRAM

## 2024-12-09 PROCEDURE — 94660 CPAP INITIATION&MGMT: CPT

## 2024-12-09 PROCEDURE — 83735 ASSAY OF MAGNESIUM: CPT | Performed by: NURSE PRACTITIONER

## 2024-12-09 PROCEDURE — 99291 CRITICAL CARE FIRST HOUR: CPT | Performed by: INTERNAL MEDICINE

## 2024-12-09 PROCEDURE — 63710000001 INSULIN LISPRO (HUMAN) PER 5 UNITS: Performed by: INTERNAL MEDICINE

## 2024-12-09 PROCEDURE — 25810000003 SODIUM CHLORIDE 0.9 % SOLUTION: Performed by: STUDENT IN AN ORGANIZED HEALTH CARE EDUCATION/TRAINING PROGRAM

## 2024-12-09 PROCEDURE — 80069 RENAL FUNCTION PANEL: CPT | Performed by: INTERNAL MEDICINE

## 2024-12-09 PROCEDURE — 25010000003 VASOPRESSIN 0.2 UNITS/ML SOLUTION: Performed by: INTERNAL MEDICINE

## 2024-12-09 PROCEDURE — 85025 COMPLETE CBC W/AUTO DIFF WBC: CPT | Performed by: STUDENT IN AN ORGANIZED HEALTH CARE EDUCATION/TRAINING PROGRAM

## 2024-12-09 RX ORDER — LEVETIRACETAM 500 MG/5ML
1000 INJECTION, SOLUTION, CONCENTRATE INTRAVENOUS ONCE
Status: COMPLETED | OUTPATIENT
Start: 2024-12-09 | End: 2024-12-09

## 2024-12-09 RX ORDER — LEVETIRACETAM 500 MG/5ML
500 INJECTION, SOLUTION, CONCENTRATE INTRAVENOUS EVERY 12 HOURS SCHEDULED
Status: DISCONTINUED | OUTPATIENT
Start: 2024-12-10 | End: 2024-12-12

## 2024-12-09 RX ADMIN — BUDESONIDE 0.5 MG: 0.5 INHALANT ORAL at 19:28

## 2024-12-09 RX ADMIN — VASOPRESSIN IN 0.9% SODIUM CHLORIDE 0.03 UNITS/MIN: 20 INJECTION INTRAVENOUS at 03:45

## 2024-12-09 RX ADMIN — LEVETIRACETAM 1000 MG: 100 INJECTION, SOLUTION INTRAVENOUS at 23:06

## 2024-12-09 RX ADMIN — Medication 10 ML: at 21:18

## 2024-12-09 RX ADMIN — DEXMEDETOMIDINE HYDROCHLORIDE 0.3 MCG/KG/HR: 4 INJECTION, SOLUTION INTRAVENOUS at 09:35

## 2024-12-09 RX ADMIN — ARFORMOTEROL TARTRATE 15 MCG: 15 SOLUTION RESPIRATORY (INHALATION) at 06:22

## 2024-12-09 RX ADMIN — WHITE PETROLATUM 1 APPLICATION: 1.75 OINTMENT TOPICAL at 09:37

## 2024-12-09 RX ADMIN — VANCOMYCIN HYDROCHLORIDE 750 MG: 5 INJECTION, POWDER, LYOPHILIZED, FOR SOLUTION INTRAVENOUS at 15:31

## 2024-12-09 RX ADMIN — Medication 10 ML: at 09:23

## 2024-12-09 RX ADMIN — SULFUR HEXAFLUORIDE 5 ML: KIT at 16:51

## 2024-12-09 RX ADMIN — LEVOTHYROXINE SODIUM 175 MCG: 0.03 TABLET ORAL at 06:35

## 2024-12-09 RX ADMIN — ARFORMOTEROL TARTRATE 15 MCG: 15 SOLUTION RESPIRATORY (INHALATION) at 19:28

## 2024-12-09 RX ADMIN — MIDODRINE HYDROCHLORIDE 5 MG: 5 TABLET ORAL at 09:23

## 2024-12-09 RX ADMIN — INSULIN LISPRO 2 UNITS: 100 INJECTION, SOLUTION INTRAVENOUS; SUBCUTANEOUS at 21:34

## 2024-12-09 RX ADMIN — CALCIUM CHLORIDE, MAGNESIUM CHLORIDE, SODIUM CHLORIDE, SODIUM BICARBONATE, POTASSIUM CHLORIDE AND SODIUM PHOSPHATE DIBASIC DIHYDRATE 500 ML/HR: 3.68; 3.05; 6.34; 3.09; .314; .187 INJECTION INTRAVENOUS at 06:59

## 2024-12-09 RX ADMIN — CALCIUM CHLORIDE, MAGNESIUM CHLORIDE, SODIUM CHLORIDE, SODIUM BICARBONATE, POTASSIUM CHLORIDE AND SODIUM PHOSPHATE DIBASIC DIHYDRATE 500 ML/HR: 3.68; 3.05; 6.34; 3.09; .314; .187 INJECTION INTRAVENOUS at 07:00

## 2024-12-09 RX ADMIN — BUDESONIDE 0.5 MG: 0.5 INHALANT ORAL at 06:23

## 2024-12-09 RX ADMIN — MICAFUNGIN SODIUM 100 MG: 100 INJECTION, POWDER, LYOPHILIZED, FOR SOLUTION INTRAVENOUS at 09:36

## 2024-12-09 NOTE — PROGRESS NOTES
[facility] Clinical Pharmacy Services: Vancomycin Monitoring Note    Nelson Wang is a 79 y.o. male who is on day 38/42 of pharmacy to dose vancomycin for Bacteremia, Bone and/or Joint Infection, and Empiric.    Previous Vancomycin Dose: 1250mg IV x1 12/7 @ 1337  Imaging Reviewed?: Yes  Updated Cultures and Sensitivities:   Microbiology Results (last 10 days)       Procedure Component Value - Date/Time    Blood Culture - Blood, Blood, Central Line [[id number]]  (Normal) Collected: 12/07/24 1023    Lab Status: Preliminary result Specimen: Blood, Central Line Updated: 12/09/24 1030     Blood Culture No growth at 2 days    Blood Culture - Blood, Blood, Arterial Line [[id number]]  (Normal) Collected: 12/07/24 1023    Lab Status: Preliminary result Specimen: Blood, Arterial Line Updated: 12/09/24 1045     Blood Culture No growth at 2 days    Pneumonia Panel - Lavage, Lung, Right Lower Lobe [[id number]]  (Normal) Collected: 12/06/24 1719    Lab Status: Final result Specimen: Lavage from Lung, Right Lower Lobe Updated: 12/06/24 1859     Escherichia coli PCR Not Detected     Acinetobacter calcoaceticus-baumannii complex PCR Not Detected     Enterobacter cloacae PCR Not Detected     Klebsiella oxytoca PCR Not Detected     Klebsiella pneumoniae group PCR Not Detected     Klebsiella aerogenes PCR Not Detected     Moraxella catarrhalis PCR Not Detected     Proteus species PCR Not Detected     Pseudomonas aeroginosa PCR Not Detected     Serratia marcescens PCR Not Detected     Staphylococcus aureus PCR Not Detected     Streptococcus pyogenes PCR Not Detected     Haemophilus influenzae PCR Not Detected     Streptococcus agalactiae PCR Not Detected     Streptococcus pneumoniae PCR Not Detected     Chlamydophila pneumoniae PCR Not Detected     Legionella pneumophilia PCR Not Detected     Mycoplasma pneumo by PCR Not Detected     ADENOVIRUS, PCR Not Detected     CTX-M Gene N/A     IMP Gene N/A     KPC Gene N/A     mecA/C and MREJ  Gene N/A     NDM Gene N/A     OXA-48-like Gene N/A     VIM Gene N/A     Coronavirus Not Detected     Human Metapneumovirus Not Detected     Human Rhinovirus/Enterovirus Not Detected     Influenza A PCR Not Detected     Influenza B PCR Not Detected     RSV, PCR Not Detected     Parainfluenza virus PCR Not Detected    BAL Culture, Quantitative - Lavage, Bronchus [[id number]] Collected: 12/06/24 1719    Lab Status: Final result Specimen: Lavage from Bronchus Updated: 12/08/24 1013     BAL Culture >100,000 CFU/mL Normal respiratory lashae. No S. aureus or Pseudomonas aeruginosa detected. Final report.     Gram Stain No organisms seen    Anaerobic Culture - Pleural Fluid, Pleural Cavity [[id number]]  (Normal) Collected: 12/06/24 1235    Lab Status: Preliminary result Specimen: Pleural Fluid from Pleural Cavity Updated: 12/09/24 1029     Anaerobic Culture No anaerobes isolated at 3 days    AFB Culture - Body Fluid, Pleural Cavity [[id number]] Collected: 12/06/24 1233    Lab Status: Preliminary result Specimen: Body Fluid from Pleural Cavity Updated: 12/07/24 1217     AFB Stain Few (2+) WBCs seen      No organisms seen      No acid fast bacilli seen on direct smear    Body Fluid Culture - Body Fluid, Pleural Cavity [[id number]] Collected: 12/06/24 1233    Lab Status: Final result Specimen: Body Fluid from Pleural Cavity Updated: 12/09/24 0952     Body Fluid Culture No growth at 3 days     Gram Stain Few (2+) WBCs seen      No organisms seen    Blood Culture - Blood, Arm, Left [[id number]]  (Abnormal)  (Susceptibility) Collected: 12/05/24 1754    Lab Status: Preliminary result Specimen: Blood from Arm, Left Updated: 12/09/24 0735     Blood Culture Staphylococcus epidermidis     Isolated from Aerobic Bottle     Blood Culture Streptococcus mutans     Isolated from Anaerobic Bottle     Gram Stain Aerobic Bottle Gram positive cocci in clusters      Anaerobic Bottle Gram positive cocci in chains    Narrative:      Refer to previous  blood culture collected on 12/05/2024 1755 for MICs.      Susceptibility        Staphylococcus epidermidis      RITU      Oxacillin >=4 ug/ml Resistant      Vancomycin 1 ug/ml Susceptible                       Susceptibility        Streptococcus mutans      RITU      Ceftriaxone <=0.12 ug/ml Susceptible      Penicillin G <=0.06 ug/ml Susceptible      Vancomycin 1 ug/ml Susceptible                           Blood Culture - Blood, Arm, Left [[id number]]  (Abnormal) Collected: 12/05/24 1755    Lab Status: Preliminary result Specimen: Blood from Arm, Left Updated: 12/09/24 0735     Blood Culture Staphylococcus epidermidis     Isolated from Aerobic and Anaerobic Bottles     Blood Culture Streptococcus mutans     Isolated from --     Blood Culture Corynebacterium species     Isolated from Anaerobic Bottle     Gram Stain Aerobic Bottle Gram positive cocci in clusters      Anaerobic Bottle Gram positive cocci in chains and clusters    Narrative:      Refer to previous blood culture collected on 12/05/2024 1755 for MICs  Corynebacterium is probable contaminant requires clinical correlation, susceptibility not performed unless requested by physician.       Blood Culture ID, PCR - Blood, Arm, Left [[id number]]  (Abnormal) Collected: 12/05/24 1755    Lab Status: Final result Specimen: Blood from Arm, Left Updated: 12/06/24 1617     BCID, PCR Staph spp, not aureus or lugdunensis. Identification by BCID2 PCR.     BCID, PCR 2 Streptococcus spp, not A, B, or pneumoniae. Identification by BCID2 PCR.     BOTTLE TYPE Aerobic Bottle    Narrative:      Staph Sp mecA/C    Blood Culture ID, PCR - Blood, Arm, Left [[id number]]  (Abnormal) Collected: 12/05/24 1755    Lab Status: Final result Specimen: Blood from Arm, Left Updated: 12/07/24 0943     BCID, PCR Staph spp, not aureus or lugdunensis. Identification by BCID2 PCR.     BCID, PCR 2 Streptococcus spp, not A, B, or pneumoniae. Identification by BCID2 PCR.     BOTTLE TYPE Anaerobic Bottle  "   Narrative:      S.epi with mecA/C    Blood Culture ID, PCR - Blood, Arm, Left [[id number]]  (Abnormal) Collected: 24 1755    Lab Status: Final result Specimen: Blood from Arm, Left Updated: 24     BCID, PCR Streptococcus spp, not A, B, or pneumoniae. Identification by BCID2 PCR.     BCID, PCR 2 Candida glabrata. Identification by BCID2 PCR.     BOTTLE TYPE Anaerobic Bottle    Narrative:      Infectious disease consultation is highly recommended to rule out distant foci of infection.              Vitals/Labs  Ht: 170.2 cm (67\"); Wt: 70.2 kg (154 lb 12.2 oz)   Temp (24hrs), Av °F (37.2 °C), Min:96.8 °F (36 °C), Max:100 °F (37.8 °C)   Estimated Creatinine Clearance: 35.4 mL/min (A) (by C-G formula based on SCr of 1.71 mg/dL (H)).   Hemodialysis, schedule to be determined, per pulm and nursing plan is to restart 2024    Results from last 7 days   Lab Units 24  1220 24  0622 24  0009 24  0410 24  2346 24  1154 24  0606 24  1238 24  0748   VANCOMYCIN RM mcg/mL  --  18.24  --   --  23.90  --  14.16  --  27.00   CREATININE mg/dL 1.71* 2.16* 2.31*   < > 1.39*   < > 1.81*  1.81*   < > 4.53*   WBC 10*3/mm3  --  5.23  --   --  6.95  --  6.28   < > 3.73    < > = values in this interval not displayed.     Assessment/Plan    Current Vancomycin Dose: Pulse dose s/t dialysis  Patient currently on CRRT. Plan to transition to HD today, however unsure when this will happen.   Given level of 18.24 this AM and still on CRRT, will dose with 750mg x1 now.   Plan to recheck level prior to next HD session.   We will continue to monitor patient changes and renal function     Thank you for involving pharmacy in this patient's care. Please contact pharmacy with any questions or concerns.    Meron Grady  Clinical Pharmacist    "

## 2024-12-09 NOTE — SIGNIFICANT NOTE
12/09/24 1108   OTHER   Discipline occupational therapist   Rehab Time/Intention   Session Not Performed other (see comments)  (hold per nsg)

## 2024-12-09 NOTE — PROGRESS NOTES
[facility]     Progress Note    Patient Name: Nelson Wang  : 1945  MRN: [id number]  Primary Care Physician:  Domingo Bravo MD  Date of admission: 2024    Subjective   Subjective     Chief Complaint:   Patient is critically ill status post cardiac arrest with hemoptysis, hypoxic respiratory failure, likely hypoxic arrest, with acute hypoxic respiratory failure, acute on chronic congestive diastolic heart failure with EF of 50 to 60%, grade 1 diastolic dysfunction, acute cardiogenic pulmonary edema loculated right-sided pleural effusion, ESRD on hemodialysis, GASTON with home CPAP.    On CRRT overnight as patient needed fluid pull off her tenuous respiratory status  Tolerating fluid removal now  On a small amount of pressors while on CRRT removing fluid  Awaiting nephrology evaluation to see if we can change him over to IHD  He is on Precedex and is somnolent.  He does grimace some to pain but not following commands  Was on BiPAP overnight currently on Airvo  No work of breathing noted  Remains on empiric antibiotic and antifungal  Low-grade fever over the last 24 hours  Chest tube on waterseal with no air leak.  X-ray this morning with no pneumothorax      Objective   Objective     Vitals:   Temp:  [96.8 °F (36 °C)-100 °F (37.8 °C)] 99.1 °F (37.3 °C)  Heart Rate:  [58-85] 65  Resp:  [10-20] 20  BP: (116-131)/(60-73) 116/73  Arterial Line BP: ()/(33-65) 128/49  Flow (L/min) (Oxygen Therapy):  [40-60] 40    Physical Exam   Vital Signs Reviewed  Critically ill, on Airvo, very lethargic and responds to painful stimuli  HEENT:  PERRL.  OP clear  Chest:  poor aeration, diminished breath sounds, resonant to percussion b/l, no increased work of breathing noted, right-sided chest tube in place, right-sided subcu emphysema+  CV: RRR, no MGR, pulses 2+, equal  Abd:  Soft, NT, ND, + BS, no HSM  EXT:  no clubbing, no cyanosis, No BLE edema, left arm AV fistula in place  Neuro: Grimaces to pain moves  all extremities, cranial nerves intact, lethargic but arousable, confused but on sedation  Skin: No rashes or lesions noted      Result Review    Result Review:  I have personally reviewed the results from the time of this admission to 12/9/2024 11:39 EST and agree with these findings:  [x]  Laboratory  [x]  Microbiology  [x]  Radiology  [x]  EKG/Telemetry   [x]  Cardiology/Vascular   []  Pathology  []  Old records  []  Other:  Most notable findings include:           Lab 12/09/24  0622 12/09/24  0009 12/08/24  0410 12/07/24  2346 12/07/24  1822 12/07/24  1154 12/07/24  0606 12/07/24  0245 12/07/24  0007 12/06/24  1248 12/06/24  1238 12/06/24  0748 12/06/24  0748 12/05/24  1635   WBC 5.23  --   --  6.95  --   --  6.28  --   --   --  5.95  --  3.73 4.13   HEMOGLOBIN 8.8*  --   --  9.3*  --   --  9.3* 9.3*  --   --  6.9*  --  8.1* 8.4*   HEMATOCRIT 29.5*  --   --  30.7*  --   --  30.8* 30.5*  --   --  24.6*  --  28.2* 29.0*   PLATELETS 115*  --   --  143  --   --  109*  --   --   --  136*  --  141 132*   SODIUM 136 140 136 136 137 136 136  136  --  140  --  144  --  134* 137   POTASSIUM 4.4 4.4 4.4 4.6 4.6 4.5 4.4  4.4  --  4.2  --  3.6  --  4.1 3.9   CHLORIDE 102 106 102 104 103 102 103  103  --  104  --  101  --  95* 97*   CO2 20.9* 20.7* 19.5* 19.7* 21.1* 21.7* 22.1  22.1  --  24.2  --  36.5*  --  28.3 30.6*   BUN 20 22 13 10 9 9 9  9  --  10  --  16  --  15 12   CREATININE 2.16* 2.31* 1.45* 1.39* 1.37* 1.45* 1.81*  1.81*  --  2.13*   < > 4.90*  --  4.53* 3.59*   GLUCOSE 136* 146* 142* 138* 83 98 75  75  --  80  --  125*  --  86 107*   CALCIUM 9.1 8.7 8.2* 8.3* 8.6 8.3* 8.3*  8.3*  --  8.2*  --  10.1  --  9.1 9.4   PHOSPHORUS 4.0 4.0 3.5 4.4 4.6* 3.8 3.5  --  2.7  --  5.7*   < >  --   --    TOTAL PROTEIN  --   --   --   --   --   --  6.4  --   --   --   --   --  7.0 7.4   ALBUMIN 3.2* 3.0*  --  2.8* 3.0* 2.8* 2.8*  2.8*  --  2.8*  --  2.7*  --  3.0* 3.3*   GLOBULIN  --   --   --   --   --   --  3.6  --    --   --   --   --  4.0 4.1    < > = values in this interval not displayed.     XR Chest 1 View    Result Date: 12/9/2024  XR CHEST 1 VW Date of Exam: 12/9/2024 9:14 AM EST Indication: follow up Comparison: Chest radiograph dated 12/8/2024 Findings: Patient has been extubated. There is a smallbore feeding tube which courses below the left hemidiaphragm and out of view. There is a right-sided chest tube in unchanged position. There is subcutaneous emphysema throughout the chest, right greater than left. This appears mildly diminished from the prior examination. There are patchy right greater than left airspace disease. No definite pneumothorax. There are degenerative changes of the thoracic spine.     Impression: Impression: 1.Interval extubation. 2.Right-sided chest tube in unchanged position. No definite pneumothorax. 3.Patchy right greater than left airspace disease, similar to the prior examination. 4.Subcutaneous emphysema throughout the chest, right greater than left. This has mildly improved from the prior exam. Electronically Signed: Reynaldo Rangel  12/9/2024 9:35 AM EST  Workstation ID: [id number]    XR Chest 1 View    Result Date: 12/8/2024  XR CHEST 1 VW Date of Exam: 12/8/2024 6:08 AM EST Indication: chest tube in place, resp failure Comparison: 12/7/2024 Findings: Heart remains enlarged. ET tube is in good position. Feeding tube is below the diaphragm and not seen. Right-sided chest tube is unchanged in position. Subcutaneous gas in the chest wall is again identified. No visible pneumothorax on either side of the chest. Bilateral infiltrates are similar to prior, right worse than left.     Impression: 1.No significant interval change. 2.Right-sided chest tube without evidence of pneumothorax. 3.Bilateral infiltrates, right worse than left. Electronically Signed: Cristian Tiwari MD  12/8/2024 6:11 AM EST  Workstation ID: [id number]    XR Chest 1 View    Result Date: 12/7/2024  XR CHEST 1 VW Date of Exam:  12/7/2024 8:11 PM EST Indication: CHECK CHEST TUBE PLACEMENT Comparison: 12/7/2024 at 0907 hours Findings: Feeding tube tip in the distal stomach. Endotracheal tube tip in good position in the lower trachea. Right chest tube remains in place. It has been pulled back slightly, but the side ports appear to be within the pleural space. There is extensive subcutaneous gas in the chest wall. Heart size is stable. Bilateral infiltrates, right greater than left, also are grossly stable. No definite pneumothorax.     Impression: 1.Right chest tube has been pulled back slightly, but the side ports appear to be within the pleural space. No definite pneumothorax. 2.Extensive subcutaneous gas in the chest wall. 3.Stable bilateral infiltrates. Electronically Signed: Cristian Tiwari MD  12/7/2024 8:31 PM EST  Workstation ID: [id number]    XR Chest 1 View    Result Date: 12/7/2024  XR CHEST 1 VW Date of Exam: 12/7/2024 9:22 AM EST Indication: follow up Comparison: December 6, 2024 Findings: Endotracheal tube has tip in the mid trachea. Diffuse bilateral subcutaneous emphysema appears slightly improved. Patchy airspace disease throughout the right lung appears unchanged. The heart and mediastinal contours appear stable. The osseous structures appear intact. No pneumothorax is identified. A right-sided chest tube is in place.     Impression: Impression: 1.Endotracheal tube in good position. 2.Right-sided chest tube in place. No pneumothorax identified. 3.Diffuse bilateral subcutaneous emphysema appears slightly improved. 4.Patchy airspace disease throughout right lung appears unchanged. Electronically Signed: Winston Reyes MD  12/7/2024 9:25 AM EST  Workstation ID: [id number]    XR Chest 1 View    Result Date: 12/6/2024  XR CHEST 1 VW Date of Exam: 12/6/2024 3:57 PM EST Indication: Desaturation Comparison: Chest radiograph dated 12/6/2024 Findings: The endotracheal tube tip is 3 cm above the mel. The cardiomediastinal silhouette  is within normal limits. There is aortic arch atherosclerotic calcification. There is a right-sided chest tube in slightly different position. There is increased subcutaneous emphysema tracking throughout both sides of the chest, right worse than left. The subcutaneous emphysema limits assessment for pneumothorax. There is multifocal right-sided airspace disease which appears mildly improved from the prior examination. There is no definite left-sided airspace disease. There are degenerative changes of the thoracic spine.     Impression: Impression: 1.Endotracheal tube tip is 3 cm above the mel. 2.Slight change in position of the right-sided chest tube with increased subcutaneous emphysema tracking throughout both sides of the chest, right worse than left. Evaluation for pneumothorax is limited due to the subcutaneous emphysema. 3.Decreased right-sided airspace disease compared to prior exam. Electronically Signed: Reynaldo Greenelor  12/6/2024 4:35 PM EST  Workstation ID: [id number]    XR Chest 1 View    Result Date: 12/6/2024  XR CHEST 1 VW Date of Exam: 12/6/2024 2:05 PM EST Indication: hypoxia Comparison: 12/6/2024 Findings: Interval retraction of the endotracheal tube which overlies the midthoracic trachea approximately 2.3 cm from the mel. Right approach thoracostomy tube overlies the right midlung, mildly retracted from prior exam. Unchanged cardiomediastinal silhouette. There are persistent multifocal airspace opacities throughout the right lung. Persistent right greater than left pleural effusions. Trace right apical pneumothorax. Increased subcutaneous emphysema within the right lateral chest wall. There is a mildly displaced right lateral seventh rib fracture. There are also mildly displaced left posterior second and third rib fractures. These are new from prior same day radiographs.     Impression: Impression: 1.Interval retraction of the endotracheal tube which now appears in more appropriate position  overlying the mid thoracic trachea. 2.Mildly displaced right lateral seventh rib fracture. There are also mildly displaced left posterior second and third rib fractures. These are new from prior same day radiographs. 3.Persistent multifocal airspace opacities throughout the right lung with right greater than left pleural effusions. 4.Mild interval retraction of the right approach thoracostomy tube. Trace right apical pneumothorax. Increased right lateral chest wall emphysema. Electronically Signed: Devante Lang MD  12/6/2024 2:26 PM EST  Workstation ID: [id number]    XR Chest 1 View    Result Date: 12/6/2024  XR CHEST 1 VW Date of Exam: 12/6/2024 12:06 PM EST Indication: hypoxa Comparison: Chest radiograph 12/5/2024, chest CT [id number] Findings: Proximal right mainstem intubation. Retraction by 3.5 cm may place the ET tube in a more standard position. Enlarged cardiomediastinal silhouette similar to prior. There is a right thoracotomy tube without appreciable pneumothorax. New right chest wall subcutaneous emphysema. There is a small to moderate size right pleural effusion similar to slightly improved from recent comparison. Suspect small layering left effusion again similar to may be slightly decreased allowing for technical differences. Worsening airspace consolidation in the right middle and lower upper lobe. Persistent right greater than left bibasilar airspace opacities. Aortic atherosclerotic disease. Degenerative related osseous change. Defibrillator pads noted     Impression: Impression: 1. Proximal right mainstem intubation. Retraction by 3.5 cm may place the ET tube in a more standard position. 2. Right thoracotomy tube without visible pneumothorax. Small volume right chest wall subcutaneous emphysema. 3. Multifocal airspace disease and/or atelectasis worsened in the right middle and upper lobes. Based on acuity this could reflect aspiration, hemorrhage or edema. 4. Right greater than left pleural effusions  similar to may be slightly improved allowing for technical differences to prior chest radiograph. Findings communicated with ordering provider Vamshi Villafana via [employer] secure chat, message viewed at 12:13 p.m. 12/6/2024. Electronically Signed: Lorne Bustamante MD  12/6/2024 12:23 PM EST  Workstation ID: [id number]     Results for orders placed during the hospital encounter of 11/01/24    Adult Transthoracic Echo Complete W/ Cont if Necessary Per Protocol    Interpretation Summary    Left ventricular systolic function is normal. Left ventricular ejection fraction appears to be 56 - 60%.    Left ventricular diastolic function was indeterminate.    The right ventricular cavity is mildly dilated. Normal right ventricular systolic function noted.    : The left atrial cavity is mildly dilated.    The aortic valve leaflets are mildly to moderately calcified    There is moderate mitral annular calcification    Insufficient TR velocity profile to estimate the right ventricular systolic pressure.    There is no evidence of pericardial effusion    Blood cultures with Candida and Staph epidermidis        Assessment & Plan   Assessment / Plan       Active Hospital Problems:  Active Hospital Problems    Diagnosis     **Hypoxia    Status post cardiac arrest secondary to hypoxia  Acute hypoxemic and hypercapnic respiratory failure require mechanical ventilation  Hemoptysis  Concern for intraparenchymal bleed   Status post thoracentesis  Candidemia  Acute on chronic hypoxic respiratory failure  Acute cardiogenic pulmonary edema  End-stage renal disease on dialysis leading to heart failure  Acute diastolic heart failure with EF of 56-60  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  Wean O2 to keep SPO2 greater than  90%  Wean off Precedex drip during the day and see if we can wake him up more  Currently on CRRT pulling off fluid  Will discuss with nephrology about switching over to IHD.  If so, I would like to remove temporary dialysis catheter  Will plan on removing arterial line and dialysis catheter tomorrow if able to switch over to IHD as he has a fistula  Wean pressors.  Goal mean arterial pressure greater than 65  DC Zosyn  On day 3 of micafungin for Candida fungemia  On day 4 of vancomycin for Staph epidermidis bacteremia.  Recently completed 6 weeks of antibiotics for Staph epidermidis discitis.  Question if he needs a different antibiotic as this is the same organism with sensitivity pattern  Will check echocardiogram and repeat blood cultures  Chest x-ray this morning with no pneumothorax.  Will clamp chest tube and repeat chest x-ray in the morning.  Can likely remove this tomorrow if no pneumothorax  Transfuse for hemoglobin less than 7  Continue with Brovana, Pulmicort and add DuoNeb.  Bronchoscopy cultures negative so far.  Advance tube feeds to goal  DNR for now.  Family okay with intubation    VTE Prophylaxis:  Pharmacologic & mechanical VTE prophylaxis orders are present.    CODE STATUS:   Level Of Support Discussed With: Next of Kin (If No Surrogate)  Code Status (Patient has no pulse and is not breathing): No CPR (Do Not Attempt to Resuscitate)  Medical Interventions (Patient has pulse or is breathing): Full Support    Discussed with family at bedside    Patient is critically ill in ICU with status post cardiac arrest, hypoxic arrest, hemoptysis, hypovolemic shock, ESRD, respiratory failure, altered mental status, bacteremia and fungemia. I spent 37 minutes of critical care time excluding any procedure notes, spent in review, analysis, obtaining history and physical, formulating care plan, and I led multi-disciplinary critical care rounds with bedside nurse, respiratory therapist, clinical pharmacist and  other allied services. I have discussed the case with primary service and other consultants as well.       Electronically signed by Scottie Valentin MD, 12/9/2024, 11:39 EST.

## 2024-12-09 NOTE — PLAN OF CARE
Goal Outcome Evaluation:  Plan of Care Reviewed With: patient, spouse, child        Progress: improving     Patient transitioned from BiPap to AirVo, tolerating well. Vaso discontinued per MAR. Trial of precedex, patient very restless and resumed per MAR. Chest tube clamped per provider, Chest Xray completed for coughing fit. IV Vanco continued. Tube feeds increased to goal. Family remains at bedside.

## 2024-12-09 NOTE — PLAN OF CARE
Goal Outcome Evaluation:  Plan of Care Reviewed With: patient        Progress: no change  Outcome Evaluation: Patient tolerating BIPAP 14/7, fio2 .35 well tonight. Patient will transfer to [facility] on day shift for oxygen need.

## 2024-12-09 NOTE — PROGRESS NOTES
[facility]   Hospitalist Progress Note  Date: 2024  Patient Name: Nelson Wang  : 1945  MRN: [id number]  Date of admission: 2024  Room/Bed: \[facility]\""      Subjective   Subjective     Chief Complaint: Shortness of breath    Summary:    Nelson Wang is a 79 y.o. 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  Patient was started on supplemental oxygen to maintain his oxygen saturations.  He was given a dose of Bumex overnight.  His chest x-ray was consistent with bilateral pleural effusions.  Pulmonology was consulted for the bilateral pleural effusions and and nephrology consulted for possible volume overload.  Pulmonology was performing a thoracentesis this morning.  Thoracentesis was showing bloody fluid.  Patient suffered CODE BLUE. Likely bleeding into his airways causing hypoxic arrest.  He received CPR for 6 minutes and achieved ROSC.  He was transferred to the ICU, intubated and on blood pressure support.  Bronchoscopy was showing blood clots in his airways.  Patient was started on CRRT.  Patient's medical status is tenuous.  He is currently on 1 pressor. CRRT is removing fluid at a slow rate.  Patient started on micafungin on 2024.  Patient extubated on 2024.  Patient is on high flow nasal cannula 30 L 25%.     Interval Followup:     Patient's pressor requirement is decreasing.  Patient's oxygen requirement is decreasing.  Patient is on Precedex for agitation.  CRRT is removing fluid.  Wife is at bedside without any questions.    All systems reviewed and negative except for what is outlined above.      Objective   Objective     Vitals:   Temp:  [96.8 °F (36 °C)-100 °F (37.8 °C)] 99.1 °F (37.3 °C)  Heart Rate:  [58-85] 61  Resp:  [10-18] 16  BP: (116-131)/(60-73) 116/73  Arterial Line BP: ()/(33-65) 148/45  Flow  (L/min) (Oxygen Therapy):  [60] 60    Physical Exam   General: Moves spontaneously  Cardiovascular: RRR, no murmurs   Pulmonary: High flow nasal cannula in place, coarse breath sounds  Gastrointestinal: Abdomen is soft  Musculoskeletal: No lower extremity edema  Neuro: Unable to assess mental status    Result Review    Result Review:  I have personally reviewed these results:  [x]  Laboratory      Lab 12/09/24 0622 12/08/24  2030 12/07/24  2346 12/07/24  0912 12/07/24  0606 12/06/24  1248 12/06/24  1238 12/06/24  1206 12/06/24  0748   WBC 5.23  --  6.95  --  6.28  --  5.95  --  3.73   HEMOGLOBIN 8.8*  --  9.3*  --  9.3*   < > 6.9*  --  8.1*   HEMATOCRIT 29.5*  --  30.7*  --  30.8*   < > 24.6*  --  28.2*   PLATELETS 115*  --  143  --  109*  --  136*  --  141   NEUTROS ABS 3.82  --  5.36  --  4.77  --  1.40*  --  1.66*   IMMATURE GRANS (ABS) 0.03  --  0.05  --  0.04  --  0.10*  --  0.01   LYMPHS ABS 0.76  --  0.84  --  0.86  --  3.88*  --  1.41   MONOS ABS 0.55  --  0.66  --  0.58  --  0.48  --  0.53   EOS ABS 0.03  --  0.01  --  0.01  --  0.07  --  0.09   MCV 91.6  --  90.0  --  89.3  --  96.5  --  94.9   PROCALCITONIN  --   --   --   --   --   --   --   --  0.27*   LACTATE 1.1 1.1 1.9   < >  --    < > 3.9*   < >  --    PROTIME  --   --   --   --   --   --  15.2*  --   --    APTT  --   --   --   --   --   --  31.1  --   --     < > = values in this interval not displayed.         Lab 12/09/24 0622 12/09/24  0009 12/08/24  0410 12/07/24  2346   SODIUM 136 140 136 136   POTASSIUM 4.4 4.4 4.4 4.6   CHLORIDE 102 106 102 104   CO2 20.9* 20.7* 19.5* 19.7*   ANION GAP 13.1 13.3 14.5 12.3   BUN 20 22 13 10   CREATININE 2.16* 2.31* 1.45* 1.39*   EGFR 30.6* 28.2* 49.3* 51.9*   GLUCOSE 136* 146* 142* 138*   CALCIUM 9.1 8.7 8.2* 8.3*   MAGNESIUM 2.6*  --  2.3 2.3   PHOSPHORUS 4.0 4.0 3.5 4.4         Lab 12/09/24  0622 12/09/24  0009 12/07/24  2346 12/07/24  1154 12/07/24  0606 12/06/24  1238 12/06/24  0748 12/05/24  1635    TOTAL PROTEIN  --   --   --   --  6.4  --  7.0 7.4   ALBUMIN 3.2* 3.0* 2.8*   < > 2.8*  2.8*   < > 3.0* 3.3*   GLOBULIN  --   --   --   --  3.6  --  4.0 4.1   ALT (SGPT)  --   --   --   --  47*  --  9 10   AST (SGOT)  --   --   --   --  138*  --  28 27   BILIRUBIN  --   --   --   --  1.2  --  0.5 0.6   ALK PHOS  --   --   --   --  63  --  69 72    < > = values in this interval not displayed.         Lab 12/06/24  1238 12/06/24  0748 12/05/24  1854 12/05/24  1635   PROBNP  --   --   --  59,503.0*   HSTROP T  --  128* 113* 103*   PROTIME 15.2*  --   --   --    INR 1.17*  --   --   --              Lab 12/06/24  1507   ABO TYPING AB   RH TYPING Positive   ANTIBODY SCREEN Negative         Lab 12/08/24  0700 12/07/24  0912 12/06/24  1409   PH, ARTERIAL 7.309* 7.376 7.343*   PCO2, ARTERIAL 41.1 32.8* 59.7*   PO2 ART 69.4* 91.0 109.7*   O2 SATURATION ART 91.9* 97.0 97.8   FIO2 35 40 50   HCO3 ART 20.6* 19.2* 32.4*   BASE EXCESS ART -5.3* -5.3* 5.0*     Brief Urine Lab Results  (Last result in the past 365 days)        Color   Clarity   Blood   Leuk Est   Nitrite   Protein   CREAT   Urine HCG        10/27/24 1417 Yellow   Cloudy   Negative   Trace   Negative   >=300 mg/dL (3+)                 [x]  Microbiology   Microbiology Results (last 10 days)       Procedure Component Value - Date/Time    Blood Culture - Blood, Blood, Central Line [[id number]]  (Normal) Collected: 12/07/24 1023    Lab Status: Preliminary result Specimen: Blood, Central Line Updated: 12/08/24 1030     Blood Culture No growth at 24 hours    Blood Culture - Blood, Blood, Arterial Line [[id number]]  (Normal) Collected: 12/07/24 1023    Lab Status: Preliminary result Specimen: Blood, Arterial Line Updated: 12/08/24 1045     Blood Culture No growth at 24 hours    Pneumonia Panel - Lavage, Lung, Right Lower Lobe [[id number]]  (Normal) Collected: 12/06/24 1710    Lab Status: Final result Specimen: Lavage from Lung, Right Lower Lobe Updated: 12/06/24 1257      Escherichia coli PCR Not Detected     Acinetobacter calcoaceticus-baumannii complex PCR Not Detected     Enterobacter cloacae PCR Not Detected     Klebsiella oxytoca PCR Not Detected     Klebsiella pneumoniae group PCR Not Detected     Klebsiella aerogenes PCR Not Detected     Moraxella catarrhalis PCR Not Detected     Proteus species PCR Not Detected     Pseudomonas aeroginosa PCR Not Detected     Serratia marcescens PCR Not Detected     Staphylococcus aureus PCR Not Detected     Streptococcus pyogenes PCR Not Detected     Haemophilus influenzae PCR Not Detected     Streptococcus agalactiae PCR Not Detected     Streptococcus pneumoniae PCR Not Detected     Chlamydophila pneumoniae PCR Not Detected     Legionella pneumophilia PCR Not Detected     Mycoplasma pneumo by PCR Not Detected     ADENOVIRUS, PCR Not Detected     CTX-M Gene N/A     IMP Gene N/A     KPC Gene N/A     mecA/C and MREJ Gene N/A     NDM Gene N/A     OXA-48-like Gene N/A     VIM Gene N/A     Coronavirus Not Detected     Human Metapneumovirus Not Detected     Human Rhinovirus/Enterovirus Not Detected     Influenza A PCR Not Detected     Influenza B PCR Not Detected     RSV, PCR Not Detected     Parainfluenza virus PCR Not Detected    BAL Culture, Quantitative - Lavage, Bronchus [[id number]] Collected: 12/06/24 1719    Lab Status: Final result Specimen: Lavage from Bronchus Updated: 12/08/24 1013     BAL Culture >100,000 CFU/mL Normal respiratory lashae. No S. aureus or Pseudomonas aeruginosa detected. Final report.     Gram Stain No organisms seen    AFB Culture - Body Fluid, Pleural Cavity [[id number]] Collected: 12/06/24 1233    Lab Status: Preliminary result Specimen: Body Fluid from Pleural Cavity Updated: 12/07/24 1217     AFB Stain Few (2+) WBCs seen      No organisms seen      No acid fast bacilli seen on direct smear    Body Fluid Culture - Body Fluid, Pleural Cavity [[id number]] Collected: 12/06/24 1233    Lab Status: Preliminary result  Specimen: Body Fluid from Pleural Cavity Updated: 12/08/24 0917     Body Fluid Culture No growth at 2 days     Gram Stain Few (2+) WBCs seen      No organisms seen    Blood Culture - Blood, Arm, Left [[id number]]  (Abnormal)  (Susceptibility) Collected: 12/05/24 1755    Lab Status: Preliminary result Specimen: Blood from Arm, Left Updated: 12/09/24 0735     Blood Culture Staphylococcus epidermidis     Isolated from Aerobic Bottle     Blood Culture Streptococcus mutans     Isolated from Anaerobic Bottle     Gram Stain Aerobic Bottle Gram positive cocci in clusters      Anaerobic Bottle Gram positive cocci in chains    Narrative:      Refer to previous blood culture collected on 12/05/2024 1755 for MICs.      Susceptibility        Staphylococcus epidermidis      RITU      Oxacillin Resistant      Vancomycin Susceptible                       Susceptibility        Streptococcus mutans      RITU      Ceftriaxone Susceptible      Penicillin G Susceptible      Vancomycin Susceptible                           Blood Culture - Blood, Arm, Left [[id number]]  (Abnormal) Collected: 12/05/24 1755    Lab Status: Preliminary result Specimen: Blood from Arm, Left Updated: 12/09/24 0735     Blood Culture Staphylococcus epidermidis     Isolated from Aerobic and Anaerobic Bottles     Blood Culture Streptococcus mutans     Isolated from --     Blood Culture Corynebacterium species     Isolated from Anaerobic Bottle     Gram Stain Aerobic Bottle Gram positive cocci in clusters      Anaerobic Bottle Gram positive cocci in chains and clusters    Narrative:      Refer to previous blood culture collected on 12/05/2024 1755 for MICs  Corynebacterium is probable contaminant requires clinical correlation, susceptibility not performed unless requested by physician.       Blood Culture ID, PCR - Blood, Arm, Left [[id number]]  (Abnormal) Collected: 12/05/24 1755    Lab Status: Final result Specimen: Blood from Arm, Left Updated: 12/06/24 1617     KATHLEEN,  PCR Staph spp, not aureus or lugdunensis. Identification by BCID2 PCR.     BCID, PCR 2 Streptococcus spp, not A, B, or pneumoniae. Identification by BCID2 PCR.     BOTTLE TYPE Aerobic Bottle    Narrative:      Staph Sp mecA/C    Blood Culture ID, PCR - Blood, Arm, Left [[id number]]  (Abnormal) Collected: 12/05/24 1755    Lab Status: Final result Specimen: Blood from Arm, Left Updated: 12/07/24 0943     BCID, PCR Staph spp, not aureus or lugdunensis. Identification by BCID2 PCR.     BCID, PCR 2 Streptococcus spp, not A, B, or pneumoniae. Identification by BCID2 PCR.     BOTTLE TYPE Anaerobic Bottle    Narrative:      S.epi with mecA/C    Blood Culture ID, PCR - Blood, Arm, Left [[id number]]  (Abnormal) Collected: 12/05/24 1755    Lab Status: Final result Specimen: Blood from Arm, Left Updated: 12/07/24 0913     BCID, PCR Streptococcus spp, not A, B, or pneumoniae. Identification by BCID2 PCR.     BCID, PCR 2 Candida glabrata. Identification by BCID2 PCR.     BOTTLE TYPE Anaerobic Bottle    Narrative:      Infectious disease consultation is highly recommended to rule out distant foci of infection.          [x]  Radiology  XR Chest 1 View    Result Date: 12/8/2024  1.No significant interval change. 2.Right-sided chest tube without evidence of pneumothorax. 3.Bilateral infiltrates, right worse than left. Electronically Signed: Cristian Tiwari MD  12/8/2024 6:11 AM EST  Workstation ID: [id number]    XR Chest 1 View    Result Date: 12/7/2024  1.Right chest tube has been pulled back slightly, but the side ports appear to be within the pleural space. No definite pneumothorax. 2.Extensive subcutaneous gas in the chest wall. 3.Stable bilateral infiltrates. Electronically Signed: Cristian Tiwari MD  12/7/2024 8:31 PM EST  Workstation ID: [id number]    XR Abdomen KUB    Result Date: 12/7/2024  Impression: Weighted tip feeding catheter projects over the stomach. Electronically Signed: Claus Sánchez MD  12/7/2024 1:08 PM EST   Workstation ID: [id number]    XR Chest 1 View    Result Date: 12/7/2024  Impression: 1.Endotracheal tube in good position. 2.Right-sided chest tube in place. No pneumothorax identified. 3.Diffuse bilateral subcutaneous emphysema appears slightly improved. 4.Patchy airspace disease throughout right lung appears unchanged. Electronically Signed: Winston Reyes MD  12/7/2024 9:25 AM EST  Workstation ID: [id number]    XR Chest 1 View    Result Date: 12/6/2024  Impression: 1.Endotracheal tube tip is 3 cm above the mel. 2.Slight change in position of the right-sided chest tube with increased subcutaneous emphysema tracking throughout both sides of the chest, right worse than left. Evaluation for pneumothorax is limited due to the subcutaneous emphysema. 3.Decreased right-sided airspace disease compared to prior exam. Electronically Signed: Reynaldo Rangel  12/6/2024 4:35 PM EST  Workstation ID: [id number]    XR Chest 1 View    Result Date: 12/6/2024  Impression: 1.Interval retraction of the endotracheal tube which now appears in more appropriate position overlying the mid thoracic trachea. 2.Mildly displaced right lateral seventh rib fracture. There are also mildly displaced left posterior second and third rib fractures. These are new from prior same day radiographs. 3.Persistent multifocal airspace opacities throughout the right lung with right greater than left pleural effusions. 4.Mild interval retraction of the right approach thoracostomy tube. Trace right apical pneumothorax. Increased right lateral chest wall emphysema. Electronically Signed: Devante Lang MD  12/6/2024 2:26 PM EST  Workstation ID: [id number]    XR Chest 1 View    Result Date: 12/6/2024  Impression: 1. Proximal right mainstem intubation. Retraction by 3.5 cm may place the ET tube in a more standard position. 2. Right thoracotomy tube without visible pneumothorax. Small volume right chest wall subcutaneous emphysema. 3. Multifocal airspace disease and/or  atelectasis worsened in the right middle and upper lobes. Based on acuity this could reflect aspiration, hemorrhage or edema. 4. Right greater than left pleural effusions similar to may be slightly improved allowing for technical differences to prior chest radiograph. Findings communicated with ordering provider Vamshi Villafana via [employer] secure chat, message viewed at 12:13 p.m. 12/6/2024. Electronically Signed: Lorne Bustamante MD  12/6/2024 12:23 PM EST  Workstation ID: [id number]    XR Chest 1 View    Result Date: 12/6/2024  Impression: 1.Decreased right-sided pleural effusion status post thoracentesis. No pneumothorax. 2.Similar bilateral airspace opacities and small left pleural effusion. Electronically Signed: Devante Lang MD  12/6/2024 11:30 AM EST  Workstation ID: [id number]    CT Chest Without Contrast Diagnostic    Result Date: 12/6/2024  Impression: 1.Moderate right and small left pleural effusions with bibasilar consolidations, which could represent atelectasis, aspiration, or pneumonia. 2.Interlobular septal thickening bilaterally, suggesting mild pulmonary edema. 3.Cardiomegaly. 4.Erosive changes at T5-6 appear similar, suggesting ongoing discitis/osteomyelitis. Electronically Signed: Winston Reyes MD  12/6/2024 8:44 AM EST  Workstation ID: [id number]    XR Chest 1 View    Result Date: 12/5/2024  Impression: Similar moderate  CHF features. Electronically Signed: Jose Hammond MD  12/5/2024 4:35 PM EST  Workstation ID: [id number]    CT Head Without Contrast    Result Date: 12/1/2024  Impression: 1. Small hematoma within the scalp overlying the right parietal bone at the vertex. No underlying skull fracture. 2. No acute intracranial abnormality. 3. Partial opacification of the right mastoid air cells unchanged from 11/15/2024 Electronically Signed: Stephan Dey MD  12/1/2024 6:27 PM EST  Workstation ID: [id number]    XR Chest 1 View    Result Date: 12/1/2024  Impression: 1. Low volume inspiration with  bibasilar and right perihilar airspace disease which may be secondary to pulmonary edema or less likely pneumonia. 2. New small right pleural effusion. Electronically Signed: Stephan Dey MD  12/1/2024 5:33 PM EST  Workstation ID: [id number]   []  EKG/Telemetry   []  Cardiology/Vascular   []  Pathology  []  Old records  []  Other:    Assessment & Plan   Assessment / Plan     Neuro:  Continue Precedex for agitation  Continue to monitor neurologic status     CV:  #Status postcardiac arrest  #Undifferentiated shock  Continue levophed  Continue midodrine  MAP goal 65  Wean as tolerated     #A-fib  Holding apixaban in setting of bleed     Resp:  #Hypoxic respiratory failure-improving  #Hemoptysis  Continue high flow nasal cannula  Continue Brovana and Pulmicort  Pulmonology/ICU team following, appreciate recs  Wean oxygen as tolerated     # Concern for empyema  Continue Zosyn  Continue micafungin     Renal:  #End-stage renal disease  Nephrology consulted appreciate recs  Continue CRRT     GI:  Start trickle feed     Endo:  # Type 2 diabetes  Continue sliding scale insulin     #Hypothyroidism  Continue levothyroxine     ID:  #Discitis/osteomyelitis  #Candidemia bacteremia  Pharmacy to dose vancomycin  Continue micafungin    Rest of care per ICU team    Discussed with RN.    VTE Prophylaxis:  Pharmacologic & mechanical VTE prophylaxis orders are present.        CODE STATUS:   Level Of Support Discussed With: Next of Kin (If No Surrogate)  Code Status (Patient has no pulse and is not breathing): No CPR (Do Not Attempt to Resuscitate)  Medical Interventions (Patient has pulse or is breathing): Full Support      Electronically signed by Wilfred Villafana MD, 12/9/2024, 08:07 EST.

## 2024-12-09 NOTE — PROGRESS NOTES
RT EQUIPMENT DEVICE RELATED - SKIN ASSESSMENT    RT Medical Equipment/Device:     High Flow Nasal Cannula:Airvo    Skin Assessment:      Cheek:  Intact  Nares:  Intact  Nose:  Intact    Device Skin Pressure Protection:  Positioning supports utilized    Nurse Notification:  Kate Pierre, RRT

## 2024-12-09 NOTE — SIGNIFICANT NOTE
12/09/24 1050   Coping/Psychosocial   Observed Emotional State calm;quiet   Verbalized Emotional State other (see comments)  (The Pt doesn't speak.)   Family/Support Persons spouse   Involvement in Care no interaction with patient   Family/Support System Care support provided   Additional Documentation Spiritual Care (Group)   Spiritual Care   Use of Spiritual Resources non-Congregation use of spiritual care   Spiritual Care Source  initiative   Spiritual Care Follow-Up follow-up, none required as presently assessed   Response to Spiritual Care receptive of support   Spiritual Care Interventions supportive conversation provided   Spiritual Care Visit Type initial   Receptivity to Spiritual Care visit welcomed     Duracion de la visita: 6 min.

## 2024-12-09 NOTE — PROGRESS NOTES
[facility]     Nephrology Progress Note      Patient Name: Nelson Wang  : 1945  MRN: [id number]  Primary Care Physician:  Domingo Bravo MD  Date of admission: 2024    Subjective   Subjective     Interval History:  Events noted.  In ICU, more hemodynamically stable, on small dose Levophed,   On CRRT.  About 3 and half liters pulled overnight.  Still mildly lethargic, was agitated earlier, on Precedex.    Family at bedside.    Review of Systems   Review of systems could not be obtained due to   patient intubated.    Objective   Objective     Vitals:   Temp:  [96.8 °F (36 °C)-100 °F (37.8 °C)] 99.5 °F (37.5 °C)  Heart Rate:  [58-83] 64  Resp:  [10-20] 18  BP: (116-130)/(60-73) 116/73  Arterial Line BP: ()/(33-65) 129/48  Flow (L/min) (Oxygen Therapy):  [30-60] 30  Physical Exam:   Constitutional: Resting, lethargic on Airvo.   Eyes: sclerae anicteric, no conjunctival injection   HENT: mucous membranes moist   Neck: Supple, no thyromegaly, no lymphadenopathy, trachea midline, No JVD   Respiratory: Decreased  to auscultation bilaterally, nonlabored respirations, right-sided chest tube.   Cardiovascular: RRR, no murmurs, rubs, or gallops.   Gastrointestinal: Positive bowel sounds, soft, nondistended   Musculoskeletal: No edema, no clubbing or cyanosis, left upper extremity AV fistula is patent with good thrill and bruit.   Psychiatric: Lethargic   Neurologic:    Skin: warm and dry, no rashes   Right femoral A-line and Shiley.  Right-sided chest tube.    Result Review    Result Reviewed:  I have personally reviewed the results from the time of this admission to 2024 15:20 EST and agree with these findings:  [x]  Laboratory  []  Microbiology  [x]  Radiology  []  EKG/Telemetry   []  Cardiology/Vascular   []  Pathology  [x]  Old records  []  Other:        Lab 24  1220 24  0622 24  0009 24  0410 24  2346 24  1822 24  1154 24  0606  12/07/24  0007 12/06/24  1409 12/06/24  1248 12/06/24  1238 12/06/24  0748 12/05/24  1635   SODIUM 138 136 140 136 136 137 136 136  136 140  --   --  144 134* 137   POTASSIUM 3.7 4.4 4.4 4.4 4.6 4.6 4.5 4.4  4.4 4.2  --   --  3.6 4.1 3.9   CHLORIDE 112* 102 106 102 104 103 102 103  103 104  --   --  101 95* 97*   CO2 18.5* 20.9* 20.7* 19.5* 19.7* 21.1* 21.7* 22.1  22.1 24.2  --   --  36.5* 28.3 30.6*   BUN 18 20 22 13 10 9 9 9  9 10  --   --  16 15 12   CREATININE 1.71* 2.16* 2.31* 1.45* 1.39* 1.37* 1.45* 1.81*  1.81* 2.13* 4.59* 4.46* 4.90* 4.53* 3.59*   GLUCOSE 121* 136* 146* 142* 138* 83 98 75  75 80  --   --  125* 86 107*   EGFR 40.5* 30.6* 28.2* 49.3* 51.9* 52.8* 49.3* 37.8*  37.8* 31.1*  --   --  11.4* 12.6* 16.6*   ANION GAP 7.5 13.1 13.3 14.5 12.3 12.9 12.3 10.9  10.9 11.8  --   --  6.5 10.7 9.4   MAGNESIUM  --  2.6*  --  2.3 2.3  --   --  2.1  --   --   --  1.9 1.8  --    PHOSPHORUS 3.0 4.0 4.0 3.5 4.4 4.6* 3.8 3.5 2.7  --   --  5.7*  --   --            Most notable findings include: As above.  Lactate 1.3  Hemoglobin 9.3      Assessment & Plan   Assessment / Plan       Active Hospital Problems:  Active Hospital Problems    Diagnosis     **Hypoxia        Assessment and Plan:    - ESRD, was on home dialysis.  Now on CRRT after CPR yesterday.  Volume status and electrolytes are adequate.  Continue CRRT today and reevaluate volume status in AM.  Electrolytes are stable.  Ultrafiltration 200 cc/h for now.  Has left upper extremity AV fistula that is patent but currently using a right femoral Shiley for CRRT.  Transition to intermittent hemodialysis soon.  - Volume overload, better now.  Plan as above .  2D echo is pending.  - Hypoxia on admission with large pleural effusion, status post thoracentesis complicated by pulmonary hemorrhage, status post right-sided chest tube and bronchoscopy.  Intubation then extubation, improving.  - Aspiration pneumonia, per pulmonary.   - Type 2 diabetes with  complications.  - History of hypertension, blood pressure is acceptable now on small dose pressors .  - Anemia of chronic kidney disease, hemoglobin below goal.  On RONALD as outpatient.  Getting Epogen while here.  - Hypothyroidism, per primary.  - Altered mentation, could be multifactorial.  Discussed with ICU team and with his family  Will follow.      Electronically signed by Coreen Castro MD, 12/9/2024, 15:20 EST.

## 2024-12-09 NOTE — SIGNIFICANT NOTE
12/09/24 1105   Physical Therapy Time and Intention   Session Not Performed other (see comments)  (Hold per nursing)

## 2024-12-09 NOTE — PROGRESS NOTES
Nutrition Services    Patient Name: Nelson Wang  YOB: 1945  MRN: [id number]  Admission date: 12/5/2024    PROGRESS NOTE      Encounter Information: EN Follow Up       PO Diet: NPO Diet NPO Type: Strict NPO   PO Supplements: NPO   PO Intake:  NPO       Current nutrition support: Novasource Renal @ 40 mL/hr, flush 65 mL q4h  Provides: 1760 kcal, 80 g pro, 1015 mL (625 mL FW + 390 mL flush)       Estimated Needs: 8864-2930 kcal, 106-141 g (1.5-2 g/70.7 kg CBW, HDU), Fluids per critical care/Renal MD       Nutrition support review: EN currently running at 10mL/hr. Discussed during critical care rounds, will increase rate and provide water flushes.       Labs (reviewed below): Lytes stable, renal indices/glucose elevated          GI Function:  No BM noted this admission (x2 days).       Nutrition Intervention Updates: Work towards goal rate of 40 mL/hr. To better meet protein needs, add Prosource No carb BID (120 kcal, 30 g protein).    Total provided: 1880 kcal, 110 g protein, 1015 mL/day      Results from last 7 days   Lab Units 12/09/24  0622 12/09/24  0009 12/08/24  0410 12/07/24  1154 12/07/24  0606 12/06/24  1238 12/06/24  0748 12/05/24  1635   SODIUM mmol/L 136 140 136   < > 136  136   < > 134* 137   POTASSIUM mmol/L 4.4 4.4 4.4   < > 4.4  4.4   < > 4.1 3.9   CHLORIDE mmol/L 102 106 102   < > 103  103   < > 95* 97*   CO2 mmol/L 20.9* 20.7* 19.5*   < > 22.1  22.1   < > 28.3 30.6*   BUN mg/dL 20 22 13   < > 9  9   < > 15 12   CREATININE mg/dL 2.16* 2.31* 1.45*   < > 1.81*  1.81*   < > 4.53* 3.59*   CALCIUM mg/dL 9.1 8.7 8.2*   < > 8.3*  8.3*   < > 9.1 9.4   BILIRUBIN mg/dL  --   --   --   --  1.2  --  0.5 0.6   ALK PHOS U/L  --   --   --   --  63  --  69 72   ALT (SGPT) U/L  --   --   --   --  47*  --  9 10   AST (SGOT) U/L  --   --   --   --  138*  --  28 27   GLUCOSE mg/dL 136* 146* 142*   < > 75  75   < > 86 107*    < > = values in this interval not displayed.     Results from last 7  days   Lab Units 12/09/24  0622 12/09/24  0009 12/08/24  0410 12/07/24  2346   MAGNESIUM mg/dL 2.6*  --  2.3 2.3   PHOSPHORUS mg/dL 4.0   < > 3.5 4.4   HEMOGLOBIN g/dL 8.8*  --   --  9.3*   HEMATOCRIT % 29.5*  --   --  30.7*    < > = values in this interval not displayed.     COVID19   Date Value Ref Range Status   10/08/2024 Not Detected Not Detected - Ref. Range Final     Lab Results   Component Value Date    HGBA1C 5.90 (H) 11/09/2024       RD to follow up per protocol.    Electronically signed by:  Emma Collazo RD  12/09/24 09:15 EST

## 2024-12-10 ENCOUNTER — APPOINTMENT (OUTPATIENT)
Dept: GENERAL RADIOLOGY | Facility: HOSPITAL | Age: 79
End: 2024-12-10
Payer: MEDICARE

## 2024-12-10 ENCOUNTER — APPOINTMENT (OUTPATIENT)
Dept: NEUROLOGY | Facility: HOSPITAL | Age: 79
End: 2024-12-10
Payer: MEDICARE

## 2024-12-10 LAB
ALBUMIN SERPL-MCNC: 3 G/DL (ref 3.5–5.2)
ALBUMIN SERPL-MCNC: 3.1 G/DL (ref 3.5–5.2)
ALBUMIN SERPL-MCNC: 3.3 G/DL (ref 3.5–5.2)
ANION GAP SERPL CALCULATED.3IONS-SCNC: 10.3 MMOL/L (ref 5–15)
ANION GAP SERPL CALCULATED.3IONS-SCNC: 12.7 MMOL/L (ref 5–15)
ANION GAP SERPL CALCULATED.3IONS-SCNC: 8.5 MMOL/L (ref 5–15)
AV MEAN PRESS GRAD SYS DOP V1V2: 7 MMHG
AV VMAX SYS DOP: 171 CM/SEC
BASOPHILS # BLD AUTO: 0.06 10*3/MM3 (ref 0–0.2)
BASOPHILS NFR BLD AUTO: 1 % (ref 0–1.5)
BH CV ECHO MEAS - AO MAX PG: 11.7 MMHG
BH CV ECHO MEAS - AO ROOT DIAM: 3.2 CM
BH CV ECHO MEAS - AO V2 VTI: 40.1 CM
BH CV ECHO MEAS - AVA(I,D): 1.81 CM2
BH CV ECHO MEAS - EDV(CUBED): 79.5 ML
BH CV ECHO MEAS - EDV(MOD-SP2): 98.6 ML
BH CV ECHO MEAS - EDV(MOD-SP4): 107 ML
BH CV ECHO MEAS - EF(MOD-SP2): 64 %
BH CV ECHO MEAS - EF(MOD-SP4): 52.1 %
BH CV ECHO MEAS - ESV(CUBED): 22 ML
BH CV ECHO MEAS - ESV(MOD-SP2): 35.5 ML
BH CV ECHO MEAS - ESV(MOD-SP4): 51.2 ML
BH CV ECHO MEAS - FS: 34.9 %
BH CV ECHO MEAS - IVS/LVPW: 0.92 CM
BH CV ECHO MEAS - IVSD: 1.1 CM
BH CV ECHO MEAS - LA DIMENSION: 4.3 CM
BH CV ECHO MEAS - LAT PEAK E' VEL: 12 CM/SEC
BH CV ECHO MEAS - LV MASS(C)D: 173.6 GRAMS
BH CV ECHO MEAS - LV MAX PG: 4.1 MMHG
BH CV ECHO MEAS - LV MEAN PG: 2 MMHG
BH CV ECHO MEAS - LV V1 MAX: 101 CM/SEC
BH CV ECHO MEAS - LV V1 VTI: 23.1 CM
BH CV ECHO MEAS - LVIDD: 4.3 CM
BH CV ECHO MEAS - LVIDS: 2.8 CM
BH CV ECHO MEAS - LVOT AREA: 3.1 CM2
BH CV ECHO MEAS - LVOT DIAM: 2 CM
BH CV ECHO MEAS - LVPWD: 1.2 CM
BH CV ECHO MEAS - MED PEAK E' VEL: 8.4 CM/SEC
BH CV ECHO MEAS - MV A MAX VEL: 81.5 CM/SEC
BH CV ECHO MEAS - MV DEC SLOPE: 560 CM/SEC2
BH CV ECHO MEAS - MV DEC TIME: 0.21 SEC
BH CV ECHO MEAS - MV E MAX VEL: 117 CM/SEC
BH CV ECHO MEAS - MV E/A: 1.44
BH CV ECHO MEAS - MV MEAN PG: 2 MMHG
BH CV ECHO MEAS - MV V2 VTI: 37.3 CM
BH CV ECHO MEAS - MVA(VTI): 1.95 CM2
BH CV ECHO MEAS - RVDD: 3.6 CM
BH CV ECHO MEAS - SV(LVOT): 72.6 ML
BH CV ECHO MEAS - SV(MOD-SP2): 63.1 ML
BH CV ECHO MEAS - SV(MOD-SP4): 55.8 ML
BH CV ECHO MEAS - TAPSE (>1.6): 1.69 CM
BH CV ECHO MEAS - TR MAX PG: 24.8 MMHG
BH CV ECHO MEAS - TR MAX VEL: 249 CM/SEC
BH CV ECHO MEASUREMENTS AVERAGE E/E' RATIO: 11.47
BUN SERPL-MCNC: 16 MG/DL (ref 8–23)
BUN SERPL-MCNC: 17 MG/DL (ref 8–23)
BUN SERPL-MCNC: 17 MG/DL (ref 8–23)
BUN/CREAT SERPL: 10.1 (ref 7–25)
BUN/CREAT SERPL: 9.2 (ref 7–25)
BUN/CREAT SERPL: 9.7 (ref 7–25)
CA-I BLDA-SCNC: 1.28 MMOL/L (ref 1.13–1.32)
CA-I BLDA-SCNC: 1.29 MMOL/L (ref 1.13–1.32)
CA-I BLDA-SCNC: 1.31 MMOL/L (ref 1.13–1.32)
CALCIUM SPEC-SCNC: 8.6 MG/DL (ref 8.6–10.5)
CALCIUM SPEC-SCNC: 8.8 MG/DL (ref 8.6–10.5)
CALCIUM SPEC-SCNC: 8.9 MG/DL (ref 8.6–10.5)
CHLORIDE SERPL-SCNC: 103 MMOL/L (ref 98–107)
CHLORIDE SERPL-SCNC: 105 MMOL/L (ref 98–107)
CHLORIDE SERPL-SCNC: 108 MMOL/L (ref 98–107)
CO2 SERPL-SCNC: 20.3 MMOL/L (ref 22–29)
CO2 SERPL-SCNC: 21.5 MMOL/L (ref 22–29)
CO2 SERPL-SCNC: 22.7 MMOL/L (ref 22–29)
CREAT SERPL-MCNC: 1.68 MG/DL (ref 0.76–1.27)
CREAT SERPL-MCNC: 1.73 MG/DL (ref 0.76–1.27)
CREAT SERPL-MCNC: 1.75 MG/DL (ref 0.76–1.27)
D-LACTATE SERPL-SCNC: 1.3 MMOL/L (ref 0.5–2)
DEPRECATED RDW RBC AUTO: 65.1 FL (ref 37–54)
EGFRCR SERPLBLD CKD-EPI 2021: 39.4 ML/MIN/1.73
EGFRCR SERPLBLD CKD-EPI 2021: 39.9 ML/MIN/1.73
EGFRCR SERPLBLD CKD-EPI 2021: 41.3 ML/MIN/1.73
EOSINOPHIL # BLD AUTO: 0.24 10*3/MM3 (ref 0–0.4)
EOSINOPHIL NFR BLD AUTO: 4.2 % (ref 0.3–6.2)
ERYTHROCYTE [DISTWIDTH] IN BLOOD BY AUTOMATED COUNT: 19.7 % (ref 12.3–15.4)
GLUCOSE BLDC GLUCOMTR-MCNC: 117 MG/DL (ref 70–99)
GLUCOSE BLDC GLUCOMTR-MCNC: 139 MG/DL (ref 70–99)
GLUCOSE BLDC GLUCOMTR-MCNC: 168 MG/DL (ref 70–99)
GLUCOSE BLDC GLUCOMTR-MCNC: 174 MG/DL (ref 70–99)
GLUCOSE SERPL-MCNC: 148 MG/DL (ref 65–99)
GLUCOSE SERPL-MCNC: 181 MG/DL (ref 65–99)
GLUCOSE SERPL-MCNC: 188 MG/DL (ref 65–99)
HCT VFR BLD AUTO: 30.5 % (ref 37.5–51)
HGB BLD-MCNC: 9.1 G/DL (ref 13–17.7)
IMM GRANULOCYTES # BLD AUTO: 0.05 10*3/MM3 (ref 0–0.05)
IMM GRANULOCYTES NFR BLD AUTO: 0.9 % (ref 0–0.5)
LV EF BIPLANE MOD: 57.8 %
LYMPHOCYTES # BLD AUTO: 1.14 10*3/MM3 (ref 0.7–3.1)
LYMPHOCYTES NFR BLD AUTO: 19.8 % (ref 19.6–45.3)
MAGNESIUM SERPL-MCNC: 2.4 MG/DL (ref 1.6–2.4)
MCH RBC QN AUTO: 27.2 PG (ref 26.6–33)
MCHC RBC AUTO-ENTMCNC: 29.8 G/DL (ref 31.5–35.7)
MCV RBC AUTO: 91.3 FL (ref 79–97)
MONOCYTES # BLD AUTO: 0.71 10*3/MM3 (ref 0.1–0.9)
MONOCYTES NFR BLD AUTO: 12.3 % (ref 5–12)
NEUTROPHILS NFR BLD AUTO: 3.57 10*3/MM3 (ref 1.7–7)
NEUTROPHILS NFR BLD AUTO: 61.8 % (ref 42.7–76)
NRBC BLD AUTO-RTO: 0.7 /100 WBC (ref 0–0.2)
PHOSPHATE SERPL-MCNC: 2.6 MG/DL (ref 2.5–4.5)
PHOSPHATE SERPL-MCNC: 2.6 MG/DL (ref 2.5–4.5)
PHOSPHATE SERPL-MCNC: 2.7 MG/DL (ref 2.5–4.5)
PLATELET # BLD AUTO: 124 10*3/MM3 (ref 140–450)
PMV BLD AUTO: 10 FL (ref 6–12)
POTASSIUM SERPL-SCNC: 3.9 MMOL/L (ref 3.5–5.2)
POTASSIUM SERPL-SCNC: 3.9 MMOL/L (ref 3.5–5.2)
POTASSIUM SERPL-SCNC: 4.1 MMOL/L (ref 3.5–5.2)
RBC # BLD AUTO: 3.34 10*6/MM3 (ref 4.14–5.8)
SODIUM SERPL-SCNC: 136 MMOL/L (ref 136–145)
SODIUM SERPL-SCNC: 138 MMOL/L (ref 136–145)
SODIUM SERPL-SCNC: 138 MMOL/L (ref 136–145)
WBC NRBC COR # BLD AUTO: 5.77 10*3/MM3 (ref 3.4–10.8)

## 2024-12-10 PROCEDURE — 85025 COMPLETE CBC W/AUTO DIFF WBC: CPT | Performed by: STUDENT IN AN ORGANIZED HEALTH CARE EDUCATION/TRAINING PROGRAM

## 2024-12-10 PROCEDURE — 80069 RENAL FUNCTION PANEL: CPT | Performed by: INTERNAL MEDICINE

## 2024-12-10 PROCEDURE — 71045 X-RAY EXAM CHEST 1 VIEW: CPT

## 2024-12-10 PROCEDURE — 83605 ASSAY OF LACTIC ACID: CPT | Performed by: INTERNAL MEDICINE

## 2024-12-10 PROCEDURE — 94799 UNLISTED PULMONARY SVC/PX: CPT

## 2024-12-10 PROCEDURE — 63710000001 INSULIN LISPRO (HUMAN) PER 5 UNITS: Performed by: INTERNAL MEDICINE

## 2024-12-10 PROCEDURE — 25810000003 SODIUM CHLORIDE 0.9 % SOLUTION: Performed by: STUDENT IN AN ORGANIZED HEALTH CARE EDUCATION/TRAINING PROGRAM

## 2024-12-10 PROCEDURE — 25010000002 LEVETRIRACETAM PER 10 MG: Performed by: INTERNAL MEDICINE

## 2024-12-10 PROCEDURE — 25010000002 MICAFUNGIN SODIUM 100 MG RECONSTITUTED SOLUTION 1 EACH VIAL: Performed by: INTERNAL MEDICINE

## 2024-12-10 PROCEDURE — 25010000002 VANCOMYCIN 5 G RECONSTITUTED SOLUTION: Performed by: STUDENT IN AN ORGANIZED HEALTH CARE EDUCATION/TRAINING PROGRAM

## 2024-12-10 PROCEDURE — 82948 REAGENT STRIP/BLOOD GLUCOSE: CPT

## 2024-12-10 PROCEDURE — 99291 CRITICAL CARE FIRST HOUR: CPT | Performed by: INTERNAL MEDICINE

## 2024-12-10 PROCEDURE — 94660 CPAP INITIATION&MGMT: CPT

## 2024-12-10 PROCEDURE — 82948 REAGENT STRIP/BLOOD GLUCOSE: CPT | Performed by: INTERNAL MEDICINE

## 2024-12-10 PROCEDURE — 82330 ASSAY OF CALCIUM: CPT

## 2024-12-10 PROCEDURE — 95816 EEG AWAKE AND DROWSY: CPT

## 2024-12-10 PROCEDURE — 99232 SBSQ HOSP IP/OBS MODERATE 35: CPT | Performed by: INTERNAL MEDICINE

## 2024-12-10 PROCEDURE — 25010000002 POTASSIUM CHLORIDE PER 2 MEQ: Performed by: INTERNAL MEDICINE

## 2024-12-10 PROCEDURE — 94664 DEMO&/EVAL PT USE INHALER: CPT

## 2024-12-10 PROCEDURE — 83735 ASSAY OF MAGNESIUM: CPT | Performed by: INTERNAL MEDICINE

## 2024-12-10 RX ORDER — INSULIN LISPRO 100 [IU]/ML
2-7 INJECTION, SOLUTION INTRAVENOUS; SUBCUTANEOUS EVERY 6 HOURS SCHEDULED
Status: DISCONTINUED | OUTPATIENT
Start: 2024-12-10 | End: 2024-12-23

## 2024-12-10 RX ORDER — TRAMADOL HYDROCHLORIDE 50 MG/1
50 TABLET ORAL ONCE
Status: COMPLETED | OUTPATIENT
Start: 2024-12-10 | End: 2024-12-10

## 2024-12-10 RX ORDER — ACETAMINOPHEN 325 MG/1
650 TABLET ORAL EVERY 6 HOURS PRN
Status: DISCONTINUED | OUTPATIENT
Start: 2024-12-10 | End: 2024-12-10

## 2024-12-10 RX ORDER — POTASSIUM CHLORIDE 29.8 MG/ML
20 INJECTION INTRAVENOUS ONCE
Status: COMPLETED | OUTPATIENT
Start: 2024-12-10 | End: 2024-12-10

## 2024-12-10 RX ADMIN — BUDESONIDE 0.5 MG: 0.5 INHALANT ORAL at 22:06

## 2024-12-10 RX ADMIN — TRAMADOL HYDROCHLORIDE 50 MG: 50 TABLET, COATED ORAL at 21:33

## 2024-12-10 RX ADMIN — CALCIUM CHLORIDE, MAGNESIUM CHLORIDE, SODIUM CHLORIDE, SODIUM BICARBONATE, POTASSIUM CHLORIDE AND SODIUM PHOSPHATE DIBASIC DIHYDRATE 500 ML/HR: 3.68; 3.05; 6.34; 3.09; .314; .187 INJECTION INTRAVENOUS at 19:50

## 2024-12-10 RX ADMIN — NOREPINEPHRINE BITARTRATE 0.04 MCG/KG/MIN: 0.03 INJECTION, SOLUTION INTRAVENOUS at 18:46

## 2024-12-10 RX ADMIN — DEXMEDETOMIDINE HYDROCHLORIDE 0.4 MCG/KG/HR: 4 INJECTION, SOLUTION INTRAVENOUS at 06:10

## 2024-12-10 RX ADMIN — LEVETIRACETAM 500 MG: 100 INJECTION, SOLUTION INTRAVENOUS at 09:23

## 2024-12-10 RX ADMIN — POTASSIUM CHLORIDE 20 MEQ: 29.8 INJECTION, SOLUTION INTRAVENOUS at 01:18

## 2024-12-10 RX ADMIN — LEVOTHYROXINE SODIUM 175 MCG: 0.03 TABLET ORAL at 06:10

## 2024-12-10 RX ADMIN — WHITE PETROLATUM 1 APPLICATION: 1.75 OINTMENT TOPICAL at 09:24

## 2024-12-10 RX ADMIN — Medication 10 ML: at 08:15

## 2024-12-10 RX ADMIN — MICAFUNGIN SODIUM 100 MG: 100 INJECTION, POWDER, LYOPHILIZED, FOR SOLUTION INTRAVENOUS at 09:24

## 2024-12-10 RX ADMIN — CALCIUM CHLORIDE, MAGNESIUM CHLORIDE, SODIUM CHLORIDE, SODIUM BICARBONATE, POTASSIUM CHLORIDE AND SODIUM PHOSPHATE DIBASIC DIHYDRATE 500 ML/HR: 3.68; 3.05; 6.34; 3.09; .314; .187 INJECTION INTRAVENOUS at 01:35

## 2024-12-10 RX ADMIN — BUDESONIDE 0.5 MG: 0.5 INHALANT ORAL at 07:27

## 2024-12-10 RX ADMIN — CALCIUM CHLORIDE, MAGNESIUM CHLORIDE, SODIUM CHLORIDE, SODIUM BICARBONATE, POTASSIUM CHLORIDE AND SODIUM PHOSPHATE DIBASIC DIHYDRATE 500 ML/HR: 3.68; 3.05; 6.34; 3.09; .314; .187 INJECTION INTRAVENOUS at 19:51

## 2024-12-10 RX ADMIN — ARFORMOTEROL TARTRATE 15 MCG: 15 SOLUTION RESPIRATORY (INHALATION) at 22:06

## 2024-12-10 RX ADMIN — VANCOMYCIN HYDROCHLORIDE 750 MG: 5 INJECTION, POWDER, LYOPHILIZED, FOR SOLUTION INTRAVENOUS at 19:52

## 2024-12-10 RX ADMIN — INSULIN LISPRO 2 UNITS: 100 INJECTION, SOLUTION INTRAVENOUS; SUBCUTANEOUS at 08:11

## 2024-12-10 RX ADMIN — ARFORMOTEROL TARTRATE 15 MCG: 15 SOLUTION RESPIRATORY (INHALATION) at 07:27

## 2024-12-10 RX ADMIN — VANCOMYCIN HYDROCHLORIDE 750 MG: 5 INJECTION, POWDER, LYOPHILIZED, FOR SOLUTION INTRAVENOUS at 08:13

## 2024-12-10 RX ADMIN — LEVETIRACETAM 500 MG: 100 INJECTION, SOLUTION INTRAVENOUS at 21:33

## 2024-12-10 RX ADMIN — Medication 10 ML: at 21:34

## 2024-12-10 NOTE — PROGRESS NOTES
RT EQUIPMENT DEVICE RELATED - SKIN ASSESSMENT    RT Medical Equipment/Device:     High Flow Nasal Cannula:Airvo    Skin Assessment:      Cheek:  Intact  Nose:  Intact    Device Skin Pressure Protection:  Pressure points protected    Nurse Notification:  Kate Low, RRT

## 2024-12-10 NOTE — PROGRESS NOTES
[facility]     Nephrology Progress Note      Patient Name: Nelson Wang  : 1945  MRN: [id number]  Primary Care Physician:  Domingo Bravo MD  Date of admission: 2024    Subjective   Subjective     Interval History:  Events noted.  In ICU, more hemodynamically stable, on small dose Levophed,   On CRRT.    Still mildly lethargic.    On high flow oxygen.    Review of Systems   Review of systems could not be obtained due to   patient intubated.    Objective   Objective     Vitals:   Temp:  [98.8 °F (37.1 °C)-99.9 °F (37.7 °C)] 99 °F (37.2 °C)  Heart Rate:  [58-75] 64  Resp:  [10-21] 16  BP: ()/(42-94) 94/47  Arterial Line BP: ()/() 95/34  Flow (L/min) (Oxygen Therapy):  [30-60] 60  Physical Exam:   Constitutional: Resting, lethargic on Airvo.   Eyes: sclerae anicteric, no conjunctival injection   HENT: mucous membranes moist   Neck: Supple, no thyromegaly, no lymphadenopathy, trachea midline, No JVD   Respiratory: Decreased  to auscultation bilaterally, nonlabored respirations, right-sided chest tube.   Cardiovascular: RRR, no murmurs, rubs, or gallops.   Gastrointestinal: Positive bowel sounds, soft, nondistended   Musculoskeletal: No edema, no clubbing or cyanosis, left upper extremity AV fistula is patent with good thrill and bruit.   Psychiatric: Lethargic   Neurologic:    Skin: warm and dry, no rashes   Right femoral A-line and Shiley.  Right-sided chest tube.    Result Review    Result Reviewed:  I have personally reviewed the results from the time of this admission to 12/10/2024 09:02 EST and agree with these findings:  [x]  Laboratory  []  Microbiology  [x]  Radiology  []  EKG/Telemetry   []  Cardiology/Vascular   []  Pathology  [x]  Old records  []  Other:        Lab 12/10/24  0620 24  2318 24  1830 24  1220 24  0622 24  0009 24  0410 24  2346 24  1822 24  1154 24  0606 24  0007 24  1409  12/06/24  1248 12/06/24  1238 12/06/24  0748 12/05/24  1635   SODIUM 136 139 139 138 136 140 136 136 137 136 136  136 140  --   --  144 134* 137   POTASSIUM 4.1 3.9 4.2 3.7 4.4 4.4 4.4 4.6 4.6 4.5 4.4  4.4 4.2  --   --  3.6 4.1 3.9   CHLORIDE 103 107 107 112* 102 106 102 104 103 102 103  103 104  --   --  101 95* 97*   CO2 20.3* 22.3 21.1* 18.5* 20.9* 20.7* 19.5* 19.7* 21.1* 21.7* 22.1  22.1 24.2  --   --  36.5* 28.3 30.6*   BUN 17 19 21 18 20 22 13 10 9 9 9  9 10  --   --  16 15 12   CREATININE 1.75* 1.82* 2.09* 1.71* 2.16* 2.31* 1.45* 1.39* 1.37* 1.45* 1.81*  1.81* 2.13* 4.59* 4.46* 4.90* 4.53* 3.59*   GLUCOSE 188* 160* 148* 121* 136* 146* 142* 138* 83 98 75  75 80  --   --  125* 86 107*   EGFR 39.4* 37.6* 31.8* 40.5* 30.6* 28.2* 49.3* 51.9* 52.8* 49.3* 37.8*  37.8* 31.1*  --   --  11.4* 12.6* 16.6*   ANION GAP 12.7 9.7 10.9 7.5 13.1 13.3 14.5 12.3 12.9 12.3 10.9  10.9 11.8  --   --  6.5 10.7 9.4   MAGNESIUM 2.4  --   --   --  2.6*  --  2.3 2.3  --   --  2.1  --   --   --  1.9 1.8  --    PHOSPHORUS 2.7 3.0 3.4 3.0 4.0 4.0 3.5 4.4 4.6* 3.8 3.5 2.7  --   --  5.7*  --   --              Assessment & Plan   Assessment / Plan       Active Hospital Problems:  Active Hospital Problems    Diagnosis     **Hypoxia        Assessment and Plan:    - ESRD, was on home dialysis.  Now on CRRT.  Volume status and electrolytes are adequate.  Continue CRRT today since still on high flow oxygen and reevaluate volume status in AM.  Electrolytes are stable.  Ultrafiltration 200 cc/h for now.  Has left upper extremity AV fistula that is patent but currently using a right femoral Shiley for CRRT.    - Volume overload, better now.  UF to help with oxygen requirements.     - Hypoxia on admission with large pleural effusion, status post thoracentesis complicated by pulmonary hemorrhage, status post right-sided chest tube and bronchoscopy.  Intubation then extubation,    - Aspiration pneumonia, per pulmonary.     - Type 2 diabetes  with complications.    - History of hypertension, blood pressure is acceptable now on small dose pressors .    - Anemia of chronic kidney disease, hemoglobin below goal.  On RONALD as outpatient.  Getting Epogen while here.    - Altered mentation

## 2024-12-10 NOTE — PLAN OF CARE
Goal Outcome Evaluation:  Plan of Care Reviewed With: patient        Progress: no change  Outcome Evaluation: Patient was found on BIPAP this morning 16/8 R 10 32%. Patient was switched to AIRVO50/40% and will continue to titrate as needed today.

## 2024-12-10 NOTE — PROGRESS NOTES
RT EQUIPMENT DEVICE RELATED - SKIN ASSESSMENT    RT Medical Equipment/Device:     NIV Mask:  Under-the-nose   size:  .    Skin Assessment:      Cheek:  Intact  Nose:  Intact    Device Skin Pressure Protection:  Skin-to-device areas padded:  None Required    Nurse Notification:  Kate Olsen, RRT

## 2024-12-10 NOTE — PLAN OF CARE
Goal Outcome Evaluation:   PT WAS ON AIRVO 30 LITERS 35% UNTIL AROUND 2240 WHEN I PLACED PT ON BIPAP 14/7 TO REST FOR THE NIGHT. WE HAVE HAD PROBLEMS WITH LOW TIDAL VOLUMES SO WE DID INCREASE SETTING TO 16/8. WILL  CONTINUE TO MONITOR.

## 2024-12-10 NOTE — PLAN OF CARE
Goal Outcome Evaluation:            Pt remains on CRRT, levo gtt, precedex gtt and tube feeds. Pt neuro remains unresponsive, no purposeful movements and does not follow commands. Ceribell attempted by pt/family refused to continue assessment. Pt remained on bipap overnight.

## 2024-12-10 NOTE — PLAN OF CARE
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 Tube feeds at goal, tolerating well.   Mobility Plan: Q2 Turns. Patient restless and weight shifting through out the day

## 2024-12-10 NOTE — PROGRESS NOTES
Nutrition Services    Patient Name: Nelson Wang  YOB: 1945  MRN: [id number]  Admission date: 12/5/2024    PROGRESS NOTE      Encounter Information: EN Follow Up       PO Diet: NPO Diet NPO Type: Strict NPO   PO Supplements: NPO   PO Intake:  NPO       Current nutrition support: Novasource Renal @ 40 mL/hr, flush 65 mL q4h  Provides: 1760 kcal, 80 g pro, 1015 mL (625 mL FW + 390 mL flush)    Prosource No Carb BID provides additional 120 kcal, 30 g pro/day    Total provided: 1880 kcal, 110 g pro, 1015 mL       Estimated Needs: 3957-5068 kcal, 106-141 g (1.5-2 g/70.7 kg CBW, HDU), Fluids per critical care/Renal MD       Nutrition support review: EN infusing @ goal. No intolerances noted.        Labs (reviewed below): Lytes stable.       GI Function:  No BM noted this admission (x3 days).       Nutrition Intervention Updates: Precedex stopped. On Levo and CRRT. Plans to remove chest tube today. Recommend daily monitoring and repletion of mag, phos, K+. Continue with current nutrition plan of care.      Results from last 7 days   Lab Units 12/10/24  0620 12/09/24  2318 12/09/24  1830 12/07/24  1154 12/07/24  0606 12/06/24  1238 12/06/24  0748 12/05/24  1635   SODIUM mmol/L 136 139 139   < > 136  136   < > 134* 137   POTASSIUM mmol/L 4.1 3.9 4.2   < > 4.4  4.4   < > 4.1 3.9   CHLORIDE mmol/L 103 107 107   < > 103  103   < > 95* 97*   CO2 mmol/L 20.3* 22.3 21.1*   < > 22.1  22.1   < > 28.3 30.6*   BUN mg/dL 17 19 21   < > 9  9   < > 15 12   CREATININE mg/dL 1.75* 1.82* 2.09*   < > 1.81*  1.81*   < > 4.53* 3.59*   CALCIUM mg/dL 8.6 8.5* 8.4*   < > 8.3*  8.3*   < > 9.1 9.4   BILIRUBIN mg/dL  --   --   --   --  1.2  --  0.5 0.6   ALK PHOS U/L  --   --   --   --  63  --  69 72   ALT (SGPT) U/L  --   --   --   --  47*  --  9 10   AST (SGOT) U/L  --   --   --   --  138*  --  28 27   GLUCOSE mg/dL 188* 160* 148*   < > 75  75   < > 86 107*    < > = values in this interval not displayed.     Results from  last 7 days   Lab Units 12/10/24  0620 12/09/24  1220 12/09/24  0622 12/09/24  0009 12/08/24  0410   MAGNESIUM mg/dL 2.4  --  2.6*  --  2.3   PHOSPHORUS mg/dL 2.7   < > 4.0   < > 3.5   HEMOGLOBIN g/dL 9.1*  --  8.8*  --   --    HEMATOCRIT % 30.5*  --  29.5*  --   --     < > = values in this interval not displayed.     COVID19   Date Value Ref Range Status   10/08/2024 Not Detected Not Detected - Ref. Range Final     Lab Results   Component Value Date    HGBA1C 5.90 (H) 11/09/2024       RD to follow up per protocol.    Electronically signed by:  Emma Collazo, MARLEEN  12/10/24 08:51 EST

## 2024-12-10 NOTE — SIGNIFICANT NOTE
12/10/24 0938   Coping/Psychosocial   Observed Emotional State calm;quiet   Verbalized Emotional State anxiety   Trust Relationship/Rapport empathic listening provided   Family/Support Persons spouse;family   Involvement in Care interacting with patient   Additional Documentation Spiritual Care (Group)   Spiritual Care   Use of Spiritual Resources non-Lutheran use of spiritual care   Spiritual Care Source  initiative   Spiritual Care Follow-Up follow-up, none required as presently assessed   Response to Spiritual Care receptive of support   Spiritual Care Interventions supportive conversation provided   Spiritual Care Visit Type follow-up   Receptivity to Spiritual Care visit welcomed     Duration of visit: 8 min.

## 2024-12-10 NOTE — SIGNIFICANT NOTE
12/10/24 0837   Physical Therapy Time and Intention   Session Not Performed other (see comments)  (Hold, no purposeful movements and not following commands)

## 2024-12-10 NOTE — SIGNIFICANT NOTE
12/10/24 0819   OTHER   Discipline occupational therapist   Rehab Time/Intention   Session Not Performed other (see comments)  (Hold, no purposeful movements and not following commands at this time.)

## 2024-12-10 NOTE — PROGRESS NOTES
[facility] Clinical Pharmacy Services: Vancomycin Monitoring Note    Nelson Wang is a 79 y.o. male who is on day 39/42 of pharmacy to dose vancomycin for Bacteremia, Bone and/or Joint Infection, and Empiric.    Previous Vancomycin Dose: 750mg IV q12h   Imaging Reviewed?: Yes  Updated Cultures and Sensitivities:   Microbiology Results (last 10 days)       Procedure Component Value - Date/Time    Blood Culture - Blood, Blood, Central Line [[id number]]  (Normal) Collected: 12/07/24 1023    Lab Status: Preliminary result Specimen: Blood, Central Line Updated: 12/10/24 1030     Blood Culture No growth at 3 days    Blood Culture - Blood, Blood, Arterial Line [[id number]]  (Normal) Collected: 12/07/24 1023    Lab Status: Preliminary result Specimen: Blood, Arterial Line Updated: 12/10/24 1045     Blood Culture No growth at 3 days    Pneumonia Panel - Lavage, Lung, Right Lower Lobe [[id number]]  (Normal) Collected: 12/06/24 1719    Lab Status: Final result Specimen: Lavage from Lung, Right Lower Lobe Updated: 12/06/24 1859     Escherichia coli PCR Not Detected     Acinetobacter calcoaceticus-baumannii complex PCR Not Detected     Enterobacter cloacae PCR Not Detected     Klebsiella oxytoca PCR Not Detected     Klebsiella pneumoniae group PCR Not Detected     Klebsiella aerogenes PCR Not Detected     Moraxella catarrhalis PCR Not Detected     Proteus species PCR Not Detected     Pseudomonas aeroginosa PCR Not Detected     Serratia marcescens PCR Not Detected     Staphylococcus aureus PCR Not Detected     Streptococcus pyogenes PCR Not Detected     Haemophilus influenzae PCR Not Detected     Streptococcus agalactiae PCR Not Detected     Streptococcus pneumoniae PCR Not Detected     Chlamydophila pneumoniae PCR Not Detected     Legionella pneumophilia PCR Not Detected     Mycoplasma pneumo by PCR Not Detected     ADENOVIRUS, PCR Not Detected     CTX-M Gene N/A     IMP Gene N/A     KPC Gene N/A     mecA/C and MREJ Gene N/A      NDM Gene N/A     OXA-48-like Gene N/A     VIM Gene N/A     Coronavirus Not Detected     Human Metapneumovirus Not Detected     Human Rhinovirus/Enterovirus Not Detected     Influenza A PCR Not Detected     Influenza B PCR Not Detected     RSV, PCR Not Detected     Parainfluenza virus PCR Not Detected    BAL Culture, Quantitative - Lavage, Bronchus [[id number]] Collected: 12/06/24 1719    Lab Status: Final result Specimen: Lavage from Bronchus Updated: 12/08/24 1013     BAL Culture >100,000 CFU/mL Normal respiratory lashae. No S. aureus or Pseudomonas aeruginosa detected. Final report.     Gram Stain No organisms seen    Anaerobic Culture - Pleural Fluid, Pleural Cavity [[id number]]  (Normal) Collected: 12/06/24 1235    Lab Status: Preliminary result Specimen: Pleural Fluid from Pleural Cavity Updated: 12/09/24 1029     Anaerobic Culture No anaerobes isolated at 3 days    AFB Culture - Body Fluid, Pleural Cavity [[id number]] Collected: 12/06/24 1233    Lab Status: Preliminary result Specimen: Body Fluid from Pleural Cavity Updated: 12/07/24 1217     AFB Stain Few (2+) WBCs seen      No organisms seen      No acid fast bacilli seen on direct smear    Body Fluid Culture - Body Fluid, Pleural Cavity [[id number]] Collected: 12/06/24 1233    Lab Status: Final result Specimen: Body Fluid from Pleural Cavity Updated: 12/09/24 0952     Body Fluid Culture No growth at 3 days     Gram Stain Few (2+) WBCs seen      No organisms seen    Blood Culture - Blood, Arm, Left [[id number]]  (Abnormal)  (Susceptibility) Collected: 12/05/24 1755    Lab Status: Preliminary result Specimen: Blood from Arm, Left Updated: 12/10/24 0739     Blood Culture Staphylococcus epidermidis     Isolated from Aerobic Bottle     Blood Culture Streptococcus mutans     Isolated from Anaerobic Bottle     Gram Stain Aerobic Bottle Gram positive cocci in clusters      Anaerobic Bottle Gram positive cocci in chains    Narrative:            Susceptibility         Staphylococcus epidermidis      RITU      Oxacillin >=4 ug/ml Resistant      Vancomycin 1 ug/ml Susceptible                       Susceptibility        Streptococcus mutans      RITU      Ceftriaxone <=0.12 ug/ml Susceptible      Penicillin G <=0.06 ug/ml Susceptible      Vancomycin 1 ug/ml Susceptible                           Blood Culture - Blood, Arm, Left [[id number]]  (Abnormal) Collected: 12/05/24 1755    Lab Status: Preliminary result Specimen: Blood from Arm, Left Updated: 12/10/24 0739     Blood Culture Staphylococcus epidermidis     Isolated from Aerobic and Anaerobic Bottles     Blood Culture Streptococcus mutans     Isolated from Anaerobic Bottle     Blood Culture Corynebacterium species     Isolated from Anaerobic Bottle     Gram Stain Aerobic Bottle Gram positive cocci in clusters      Anaerobic Bottle Gram positive cocci in chains and clusters    Narrative:      Refer to previous blood culture collected on 12/05/2024 1755 for MICs  Corynebacterium is probable contaminant requires clinical correlation, susceptibility not performed unless requested by physician.       Blood Culture ID, PCR - Blood, Arm, Left [[id number]]  (Abnormal) Collected: 12/05/24 1755    Lab Status: Final result Specimen: Blood from Arm, Left Updated: 12/06/24 1617     BCID, PCR Staph spp, not aureus or lugdunensis. Identification by BCID2 PCR.     BCID, PCR 2 Streptococcus spp, not A, B, or pneumoniae. Identification by BCID2 PCR.     BOTTLE TYPE Aerobic Bottle    Narrative:      Staph Sp mecA/C    Blood Culture ID, PCR - Blood, Arm, Left [[id number]]  (Abnormal) Collected: 12/05/24 1755    Lab Status: Final result Specimen: Blood from Arm, Left Updated: 12/07/24 0943     BCID, PCR Staph spp, not aureus or lugdunensis. Identification by BCID2 PCR.     BCID, PCR 2 Streptococcus spp, not A, B, or pneumoniae. Identification by BCID2 PCR.     BOTTLE TYPE Anaerobic Bottle    Narrative:      S.epi with mecA/C    Blood Culture ID, PCR -  "Blood, Arm, Left [[id number]]  (Abnormal) Collected: 24 120    Lab Status: Final result Specimen: Blood from Arm, Left Updated: 24     BCID, PCR Streptococcus spp, not A, B, or pneumoniae. Identification by BCID2 PCR.     BCID, PCR 2 Candida glabrata. Identification by BCID2 PCR.     BOTTLE TYPE Anaerobic Bottle    Narrative:      Infectious disease consultation is highly recommended to rule out distant foci of infection.              Vitals/Labs  Ht: 170.2 cm (67\"); Wt: 70.2 kg (154 lb 12.2 oz)   Temp (24hrs), Av.4 °F (37.4 °C), Min:98.8 °F (37.1 °C), Max:99.9 °F (37.7 °C)   Estimated Creatinine Clearance: 34.5 mL/min (A) (by C-G formula based on SCr of 1.75 mg/dL (H)).   Hemodialysis, schedule to be determined, per pulm and nursing plan is to restart 2024    Results from last 7 days   Lab Units 12/10/24  0620 24  2318 24  1830 24  1522 24  1220 24  0622 24  0410 24  2346 24  1154 24  0606 24  1238 24  0748   VANCOMYCIN RM mcg/mL  --   --   --   --   --  18.24  --  23.90  --  14.16  --  27.00   VANCOMYCIN TR mcg/mL  --   --   --  17.48  --   --   --   --   --   --   --   --    CREATININE mg/dL 1.75* 1.82* 2.09*  --    < > 2.16*   < > 1.39*   < > 1.81*  1.81*   < > 4.53*   WBC 10*3/mm3 5.77  --   --   --   --  5.23  --  6.95  --  6.28   < > 3.73    < > = values in this interval not displayed.     Assessment/Plan    Current Vancomycin Dose: Pulse dose s/t dialysis  Patient currently on CRRT. Plan to transition to HD once able  Will continue 750mg IV q12h   Recheck random level in AM.   We will continue to monitor patient changes and renal function     Thank you for involving pharmacy in this patient's care. Please contact pharmacy with any questions or concerns.    Meron Grady  Clinical Pharmacist      "

## 2024-12-10 NOTE — PROGRESS NOTES
[facility]     Progress Note    Patient Name: Nelson Wang  : 1945  MRN: [id number]  Primary Care Physician:  Domingo Bravo MD  Date of admission: 2024    Subjective   Subjective     Chief Complaint:   Patient is critically ill status post cardiac arrest with hemoptysis, hypoxic respiratory failure, likely hypoxic arrest, with acute hypoxic respiratory failure, acute on chronic congestive diastolic heart failure with EF of 50 to 60%, grade 1 diastolic dysfunction, acute cardiogenic pulmonary edema loculated right-sided pleural effusion, ESRD on hemodialysis, GASTON with home CPAP.    Respiratory status remains tenuous  On CRRT tolerating fluid removal  Repeat blood cultures negative  Still desaturates with lying flat  Mental status poor but better than yesterday  Does withdrawal to painful stimuli  On Keppra started overnight for possible seizures echocardiogram pending  Remains on empiric antibiotic and antifungal  Chest tube clamped x 24 hours.  X-ray this morning with no pneumothorax      Objective   Objective     Vitals:   Temp:  [98.8 °F (37.1 °C)-99.9 °F (37.7 °C)] 99 °F (37.2 °C)  Heart Rate:  [58-75] 73  Resp:  [10-21] 15  BP: ()/(42-94) 125/47  Arterial Line BP: ()/() 128/44  Flow (L/min) (Oxygen Therapy):  [30-60] 60    Physical Exam   Vital Signs Reviewed  Critically ill, on Airvo, very lethargic and responds to painful stimuli  HEENT:  PERRL.  OP clear  Chest:  poor aeration, diminished breath sounds, resonant to percussion b/l, no increased work of breathing noted, right-sided chest tube in place, right-sided subcu emphysema+  CV: RRR, no MGR, pulses 2+, equal  Abd:  Soft, NT, ND, + BS, no HSM  EXT:  no clubbing, no cyanosis, No BLE edema, left arm AV fistula in place  Neuro: Grimaces to pain moves all extremities, cranial nerves intact, lethargic but arousable, confused but on sedation  Skin: No rashes or lesions noted      Result Review    Result Review:  I have  personally reviewed the results from the time of this admission to 12/10/2024 12:16 EST and agree with these findings:  [x]  Laboratory  [x]  Microbiology  [x]  Radiology  [x]  EKG/Telemetry   [x]  Cardiology/Vascular   []  Pathology  []  Old records  []  Other:  Most notable findings include:           Lab 12/10/24  0620 12/09/24  2318 12/09/24  1830 12/09/24  1220 12/09/24  0622 12/09/24  0009 12/08/24  0410 12/07/24  2346 12/07/24  1154 12/07/24  0606 12/07/24  0245 12/06/24  1248 12/06/24  1238 12/06/24  0748 12/05/24  1635   WBC 5.77  --   --   --  5.23  --   --  6.95  --  6.28  --   --  5.95 3.73 4.13   HEMOGLOBIN 9.1*  --   --   --  8.8*  --   --  9.3*  --  9.3* 9.3*  --  6.9* 8.1* 8.4*   HEMATOCRIT 30.5*  --   --   --  29.5*  --   --  30.7*  --  30.8* 30.5*  --  24.6* 28.2* 29.0*   PLATELETS 124*  --   --   --  115*  --   --  143  --  109*  --   --  136* 141 132*   SODIUM 136 139 139 138 136 140 136 136   < > 136  136  --    < > 144 134* 137   POTASSIUM 4.1 3.9 4.2 3.7 4.4 4.4 4.4 4.6   < > 4.4  4.4  --    < > 3.6 4.1 3.9   CHLORIDE 103 107 107 112* 102 106 102 104   < > 103  103  --    < > 101 95* 97*   CO2 20.3* 22.3 21.1* 18.5* 20.9* 20.7* 19.5* 19.7*   < > 22.1  22.1  --    < > 36.5* 28.3 30.6*   BUN 17 19 21 18 20 22 13 10   < > 9  9  --    < > 16 15 12   CREATININE 1.75* 1.82* 2.09* 1.71* 2.16* 2.31* 1.45* 1.39*   < > 1.81*  1.81*  --    < > 4.90* 4.53* 3.59*   GLUCOSE 188* 160* 148* 121* 136* 146* 142* 138*   < > 75  75  --    < > 125* 86 107*   CALCIUM 8.6 8.5* 8.4* 7.7* 9.1 8.7 8.2* 8.3*   < > 8.3*  8.3*  --    < > 10.1 9.1 9.4   PHOSPHORUS 2.7 3.0 3.4 3.0 4.0 4.0 3.5 4.4   < > 3.5  --    < > 5.7*  --   --    TOTAL PROTEIN  --   --   --   --   --   --   --   --   --  6.4  --   --   --  7.0 7.4   ALBUMIN 3.1* 2.9* 3.0* 2.8* 3.2* 3.0*  --  2.8*   < > 2.8*  2.8*  --    < > 2.7* 3.0* 3.3*   GLOBULIN  --   --   --   --   --   --   --   --   --  3.6  --   --   --  4.0 4.1    < > = values in this  interval not displayed.     XR Chest 1 View    Result Date: 12/10/2024  XR CHEST 1 VW Date of Exam: 12/10/2024 2:00 AM EST Indication: RECHECK FOR PNEUMO Comparison: 12/9/2024 Findings: Feeding tube tip is below the diaphragm but not seen. Heart remains enlarged. Right chest tube remains in place. No pneumothorax identified on either side of the chest. There is continued infiltrate at the right base. There is improved aeration at the left lung base. Subcutaneous emphysema is again seen on both sides of the chest.     Impression: 1.No evidence of pneumothorax. 2.Improved aeration at the left lung base. 3.Persistent infiltrate at the right lung base. 4.Subcutaneous emphysema. Electronically Signed: Cristian Tiwari MD  12/10/2024 2:10 AM EST  Workstation ID: [id number]    XR Chest 1 View    Result Date: 12/9/2024  XR CHEST 1 VW Date of Exam: 12/9/2024 4:56 PM EST Indication: Change in breathing, increased coughing Comparison: 12/9/2020 Findings: Nasogastric feeding tube remains in place and unchanged in position. Heart size and pulmonary vessels are within normal limits. Again seen is a large amount of subcutaneous emphysema throughout the chest. This appears similar to the prior study. Right-sided thoracostomy tube remains in place and unchanged in position. No definite pneumothorax identified. There is patchy bilateral airspace disease which is minimally improved from prior study. Lung volumes remain low. No definite pleural effusion.     Impression: Impression: 1. No change in subcutaneous emphysema. 2. Slight improvement in patchy bilateral airspace disease. 3. No change in right thoracostomy tube. No definite pneumothorax identified. Electronically Signed: Stephan Dey MD  12/9/2024 5:18 PM EST  Workstation ID: [id number]    XR Chest 1 View    Result Date: 12/9/2024  XR CHEST 1 VW Date of Exam: 12/9/2024 9:14 AM EST Indication: follow up Comparison: Chest radiograph dated 12/8/2024 Findings: Patient has been  extubated. There is a smallbore feeding tube which courses below the left hemidiaphragm and out of view. There is a right-sided chest tube in unchanged position. There is subcutaneous emphysema throughout the chest, right greater than left. This appears mildly diminished from the prior examination. There are patchy right greater than left airspace disease. No definite pneumothorax. There are degenerative changes of the thoracic spine.     Impression: Impression: 1.Interval extubation. 2.Right-sided chest tube in unchanged position. No definite pneumothorax. 3.Patchy right greater than left airspace disease, similar to the prior examination. 4.Subcutaneous emphysema throughout the chest, right greater than left. This has mildly improved from the prior exam. Electronically Signed: Reynaldo Rangel  12/9/2024 9:35 AM EST  Workstation ID: [id number]    XR Chest 1 View    Result Date: 12/8/2024  XR CHEST 1 VW Date of Exam: 12/8/2024 6:08 AM EST Indication: chest tube in place, resp failure Comparison: 12/7/2024 Findings: Heart remains enlarged. ET tube is in good position. Feeding tube is below the diaphragm and not seen. Right-sided chest tube is unchanged in position. Subcutaneous gas in the chest wall is again identified. No visible pneumothorax on either side of the chest. Bilateral infiltrates are similar to prior, right worse than left.     Impression: 1.No significant interval change. 2.Right-sided chest tube without evidence of pneumothorax. 3.Bilateral infiltrates, right worse than left. Electronically Signed: Cristian Tiwari MD  12/8/2024 6:11 AM EST  Workstation ID: [id number]    XR Chest 1 View    Result Date: 12/7/2024  XR CHEST 1 VW Date of Exam: 12/7/2024 8:11 PM EST Indication: CHECK CHEST TUBE PLACEMENT Comparison: 12/7/2024 at 0907 hours Findings: Feeding tube tip in the distal stomach. Endotracheal tube tip in good position in the lower trachea. Right chest tube remains in place. It has been pulled back  slightly, but the side ports appear to be within the pleural space. There is extensive subcutaneous gas in the chest wall. Heart size is stable. Bilateral infiltrates, right greater than left, also are grossly stable. No definite pneumothorax.     Impression: 1.Right chest tube has been pulled back slightly, but the side ports appear to be within the pleural space. No definite pneumothorax. 2.Extensive subcutaneous gas in the chest wall. 3.Stable bilateral infiltrates. Electronically Signed: Cristian Tiwari MD  12/7/2024 8:31 PM EST  Workstation ID: [id number]     Results for orders placed during the hospital encounter of 11/01/24    Adult Transthoracic Echo Complete W/ Cont if Necessary Per Protocol    Interpretation Summary    Left ventricular systolic function is normal. Left ventricular ejection fraction appears to be 56 - 60%.    Left ventricular diastolic function was indeterminate.    The right ventricular cavity is mildly dilated. Normal right ventricular systolic function noted.    : The left atrial cavity is mildly dilated.    The aortic valve leaflets are mildly to moderately calcified    There is moderate mitral annular calcification    Insufficient TR velocity profile to estimate the right ventricular systolic pressure.    There is no evidence of pericardial effusion    Blood cultures with Candida and Staph epidermidis  Repeat blood cultures negative      Assessment & Plan   Assessment / Plan       Active Hospital Problems:  Active Hospital Problems    Diagnosis     **Hypoxia    Status post cardiac arrest secondary to hypoxia  Acute hypoxemic and hypercapnic respiratory failure require mechanical ventilation  Hemoptysis  Concern for intraparenchymal bleed   Status post thoracentesis  Candidemia  Acute on chronic hypoxic respiratory failure  Acute cardiogenic pulmonary edema  End-stage renal disease on dialysis leading to heart failure  Acute diastolic heart failure with EF of 56-60  Staph epidermidis  discitis on vancomycin  Type 2 diabetes  A-fib on apixaban  Altered mental status metabolic encephalopathy question seizures    Plan:   Respiratory status remains rather tenuous  Continue BiPAP at night and Airvo during the day.  Wean O2 to keep SPO2 greater than 90%  Wean off Precedex drip today and see if we can wake him up and assess his mental status  Empirically started Keppra overnight.  Will check EEG today.  Respiratory status too tenuous to consider going down for MRI  Currently on CRRT pulling off fluid.  Likely can switch over to IHD tomorrow.  Will discuss with nephrology  Consider removing arterial line and dialysis catheter tomorrow  Wean pressors.  Goal mean arterial pressure greater than 65  On day 4 of micafungin for Candida fungemia  On day 4 of vancomycin for Staph epidermidis bacteremia.  Recently completed 6 weeks of antibiotics for Staph epidermidis discitis.  Per ID, they would like a CT of the abdomen and pelvis as well as a MRI of the T and L-spine.  I think given his tenuous respiratory status, he would not do well to being in a scanner that long  Echocardiogram pending.  Repeat blood cultures negative.  Chest x-ray this morning with no pneumothorax.  Remove chest tube today  Transfuse for hemoglobin less than 7  Continue with Brovana Pulmicort and add DuoNeb.  Bronchoscopy cultures negative so far.  Tube feeds at goal  DNR for now.  Family okay with intubation    VTE Prophylaxis:  Pharmacologic & mechanical VTE prophylaxis orders are present.    CODE STATUS:   Level Of Support Discussed With: Next of Kin (If No Surrogate)  Code Status (Patient has no pulse and is not breathing): No CPR (Do Not Attempt to Resuscitate)  Medical Interventions (Patient has pulse or is breathing): Full Support    Discussed with family at bedside    Patient is critically ill in ICU with status post cardiac arrest, hypoxic arrest, hemoptysis, hypovolemic shock, ESRD, respiratory failure, altered mental status,  bacteremia and fungemia. I spent 35 minutes of critical care time excluding any procedure notes, spent in review, analysis, obtaining history and physical, formulating care plan, and I led multi-disciplinary critical care rounds with bedside nurse, respiratory therapist, clinical pharmacist and other allied services. I have discussed the case with primary service and other consultants as well.     Electronically signed by Scottie Valentin MD, 12/10/2024, 12:16 EST.

## 2024-12-10 NOTE — PROGRESS NOTES
[facility]   Hospitalist Progress Note  Date: 12/10/2024  Patient Name: Nelson Wang  : 1945  MRN: [id number]  Date of admission: 2024  Room/Bed: [facility]      Subjective   Subjective     Chief Complaint: Shortness of breath    Summary:    Nelson Wang is a 79 y.o. 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  Patient was started on supplemental oxygen to maintain his oxygen saturations.  He was given a dose of Bumex overnight.  His chest x-ray was consistent with bilateral pleural effusions.  Pulmonology was consulted for the bilateral pleural effusions and and nephrology consulted for possible volume overload.  Pulmonology was performing a thoracentesis this morning.  Thoracentesis was showing bloody fluid.  Patient suffered CODE BLUE. Likely bleeding into his airways causing hypoxic arrest.  He received CPR for 6 minutes and achieved ROSC.  He was transferred to the ICU, intubated and on blood pressure support.  Bronchoscopy was showing blood clots in his airways.  Patient was started on CRRT.  Patient's medical status is tenuous.  He is currently on 1 pressor. CRRT is removing fluid at a slow rate.  Patient started on micafungin on 2024.  Patient extubated on 2024.  Patient is on high flow nasal cannula 30 L 25%.     Interval Followup:   Patient remains on low-dose norepinephrine for blood pressure support.  Continues on CRRT, discussed with nephrologist.  Patient had concerns for seizures overnight therefore Keppra was started, EEG has been ordered, will follow-up.      Objective   Objective     Vitals:   Temp:  [98.8 °F (37.1 °C)-99.9 °F (37.7 °C)] 99.1 °F (37.3 °C)  Heart Rate:  [58-80] 80  Resp:  [10-21] 15  BP: ()/(37-80) 120/44  Arterial Line BP: ()/() 115/34  Flow (L/min) (Oxygen Therapy):  [7-60]  7    Physical Exam   General: Moves spontaneously, not interacting appropriately  Cardiovascular: RRR, no murmurs   Pulmonary: High flow nasal cannula in place, coarse breath sounds  Gastrointestinal: Abdomen is soft  Musculoskeletal: No lower extremity edema  Neuro: Unable to assess mental status    Result Review    Result Review:  I have personally reviewed these results:  [x]  Laboratory      Lab 12/10/24  0620 12/09/24  0622 12/08/24  2030 12/07/24  2346 12/06/24  1248 12/06/24  1238 12/06/24  1206 12/06/24  0748   WBC 5.77 5.23  --  6.95   < > 5.95  --  3.73   HEMOGLOBIN 9.1* 8.8*  --  9.3*   < > 6.9*  --  8.1*   HEMATOCRIT 30.5* 29.5*  --  30.7*   < > 24.6*  --  28.2*   PLATELETS 124* 115*  --  143   < > 136*  --  141   NEUTROS ABS 3.57 3.82  --  5.36   < > 1.40*  --  1.66*   IMMATURE GRANS (ABS) 0.05 0.03  --  0.05   < > 0.10*  --  0.01   LYMPHS ABS 1.14 0.76  --  0.84   < > 3.88*  --  1.41   MONOS ABS 0.71 0.55  --  0.66   < > 0.48  --  0.53   EOS ABS 0.24 0.03  --  0.01   < > 0.07  --  0.09   MCV 91.3 91.6  --  90.0   < > 96.5  --  94.9   PROCALCITONIN  --   --   --   --   --   --   --  0.27*   LACTATE 1.3 1.1 1.1 1.9   < > 3.9*   < >  --    PROTIME  --   --   --   --   --  15.2*  --   --    APTT  --   --   --   --   --  31.1  --   --     < > = values in this interval not displayed.         Lab 12/10/24  1221 12/10/24  0620 12/09/24  2318 12/09/24  1220 12/09/24  0622 12/09/24  0009 12/08/24  0410   SODIUM 138 136 139   < > 136   < > 136   POTASSIUM 3.9 4.1 3.9   < > 4.4   < > 4.4   CHLORIDE 108* 103 107   < > 102   < > 102   CO2 21.5* 20.3* 22.3   < > 20.9*   < > 19.5*   ANION GAP 8.5 12.7 9.7   < > 13.1   < > 14.5   BUN 17 17 19   < > 20   < > 13   CREATININE 1.68* 1.75* 1.82*   < > 2.16*   < > 1.45*   EGFR 41.3* 39.4* 37.6*   < > 30.6*   < > 49.3*   GLUCOSE 148* 188* 160*   < > 136*   < > 142*   CALCIUM 8.8 8.6 8.5*   < > 9.1   < > 8.2*   MAGNESIUM  --  2.4  --   --  2.6*  --  2.3   PHOSPHORUS 2.6 2.7  3.0   < > 4.0   < > 3.5    < > = values in this interval not displayed.         Lab 12/10/24  1221 12/10/24  0620 12/09/24  2318 12/07/24  1154 12/07/24  0606 12/06/24  1238 12/06/24  0748 12/05/24  1635   TOTAL PROTEIN  --   --   --   --  6.4  --  7.0 7.4   ALBUMIN 3.0* 3.1* 2.9*   < > 2.8*  2.8*   < > 3.0* 3.3*   GLOBULIN  --   --   --   --  3.6  --  4.0 4.1   ALT (SGPT)  --   --   --   --  47*  --  9 10   AST (SGOT)  --   --   --   --  138*  --  28 27   BILIRUBIN  --   --   --   --  1.2  --  0.5 0.6   ALK PHOS  --   --   --   --  63  --  69 72    < > = values in this interval not displayed.         Lab 12/06/24  1238 12/06/24  0748 12/05/24  1854 12/05/24  1635   PROBNP  --   --   --  59,503.0*   HSTROP T  --  128* 113* 103*   PROTIME 15.2*  --   --   --    INR 1.17*  --   --   --              Lab 12/06/24  1507   ABO TYPING AB   RH TYPING Positive   ANTIBODY SCREEN Negative         Lab 12/08/24  0700 12/07/24  0912 12/06/24  1409   PH, ARTERIAL 7.309* 7.376 7.343*   PCO2, ARTERIAL 41.1 32.8* 59.7*   PO2 ART 69.4* 91.0 109.7*   O2 SATURATION ART 91.9* 97.0 97.8   FIO2 35 40 50   HCO3 ART 20.6* 19.2* 32.4*   BASE EXCESS ART -5.3* -5.3* 5.0*     Brief Urine Lab Results  (Last result in the past 365 days)        Color   Clarity   Blood   Leuk Est   Nitrite   Protein   CREAT   Urine HCG        10/27/24 1417 Yellow   Cloudy   Negative   Trace   Negative   >=300 mg/dL (3+)                 [x]  Microbiology   Microbiology Results (last 10 days)       Procedure Component Value - Date/Time    Blood Culture - Blood, Blood, Central Line [[id number]]  (Normal) Collected: 12/07/24 1023    Lab Status: Preliminary result Specimen: Blood, Central Line Updated: 12/10/24 1030     Blood Culture No growth at 3 days    Blood Culture - Blood, Blood, Arterial Line [[id number]]  (Normal) Collected: 12/07/24 1023    Lab Status: Preliminary result Specimen: Blood, Arterial Line Updated: 12/10/24 1045     Blood Culture No growth at 3 days     Pneumonia Panel - Lavage, Lung, Right Lower Lobe [[id number]]  (Normal) Collected: 12/06/24 1719    Lab Status: Final result Specimen: Lavage from Lung, Right Lower Lobe Updated: 12/06/24 1859     Escherichia coli PCR Not Detected     Acinetobacter calcoaceticus-baumannii complex PCR Not Detected     Enterobacter cloacae PCR Not Detected     Klebsiella oxytoca PCR Not Detected     Klebsiella pneumoniae group PCR Not Detected     Klebsiella aerogenes PCR Not Detected     Moraxella catarrhalis PCR Not Detected     Proteus species PCR Not Detected     Pseudomonas aeroginosa PCR Not Detected     Serratia marcescens PCR Not Detected     Staphylococcus aureus PCR Not Detected     Streptococcus pyogenes PCR Not Detected     Haemophilus influenzae PCR Not Detected     Streptococcus agalactiae PCR Not Detected     Streptococcus pneumoniae PCR Not Detected     Chlamydophila pneumoniae PCR Not Detected     Legionella pneumophilia PCR Not Detected     Mycoplasma pneumo by PCR Not Detected     ADENOVIRUS, PCR Not Detected     CTX-M Gene N/A     IMP Gene N/A     KPC Gene N/A     mecA/C and MREJ Gene N/A     NDM Gene N/A     OXA-48-like Gene N/A     VIM Gene N/A     Coronavirus Not Detected     Human Metapneumovirus Not Detected     Human Rhinovirus/Enterovirus Not Detected     Influenza A PCR Not Detected     Influenza B PCR Not Detected     RSV, PCR Not Detected     Parainfluenza virus PCR Not Detected    BAL Culture, Quantitative - Lavage, Bronchus [[id number]] Collected: 12/06/24 1719    Lab Status: Final result Specimen: Lavage from Bronchus Updated: 12/08/24 1013     BAL Culture >100,000 CFU/mL Normal respiratory lashae. No S. aureus or Pseudomonas aeruginosa detected. Final report.     Gram Stain No organisms seen    Anaerobic Culture - Pleural Fluid, Pleural Cavity [[id number]]  (Normal) Collected: 12/06/24 1235    Lab Status: Preliminary result Specimen: Pleural Fluid from Pleural Cavity Updated: 12/09/24 1029      Anaerobic Culture No anaerobes isolated at 3 days    AFB Culture - Body Fluid, Pleural Cavity [[id number]] Collected: 12/06/24 1233    Lab Status: Preliminary result Specimen: Body Fluid from Pleural Cavity Updated: 12/07/24 1217     AFB Stain Few (2+) WBCs seen      No organisms seen      No acid fast bacilli seen on direct smear    Body Fluid Culture - Body Fluid, Pleural Cavity [[id number]] Collected: 12/06/24 1233    Lab Status: Final result Specimen: Body Fluid from Pleural Cavity Updated: 12/09/24 0952     Body Fluid Culture No growth at 3 days     Gram Stain Few (2+) WBCs seen      No organisms seen    Blood Culture - Blood, Arm, Left [[id number]]  (Abnormal)  (Susceptibility) Collected: 12/05/24 1755    Lab Status: Preliminary result Specimen: Blood from Arm, Left Updated: 12/10/24 0739     Blood Culture Staphylococcus epidermidis     Isolated from Aerobic Bottle     Blood Culture Streptococcus mutans     Isolated from Anaerobic Bottle     Gram Stain Aerobic Bottle Gram positive cocci in clusters      Anaerobic Bottle Gram positive cocci in chains    Narrative:            Susceptibility        Staphylococcus epidermidis      RITU      Oxacillin Resistant      Vancomycin Susceptible                       Susceptibility        Streptococcus mutans      RITU      Ceftriaxone Susceptible      Penicillin G Susceptible      Vancomycin Susceptible                           Blood Culture - Blood, Arm, Left [[id number]]  (Abnormal) Collected: 12/05/24 1755    Lab Status: Preliminary result Specimen: Blood from Arm, Left Updated: 12/10/24 0739     Blood Culture Staphylococcus epidermidis     Isolated from Aerobic and Anaerobic Bottles     Blood Culture Streptococcus mutans     Isolated from Anaerobic Bottle     Blood Culture Corynebacterium species     Isolated from Anaerobic Bottle     Gram Stain Aerobic Bottle Gram positive cocci in clusters      Anaerobic Bottle Gram positive cocci in chains and clusters    Narrative:       Refer to previous blood culture collected on 12/05/2024 1755 for MICs  Corynebacterium is probable contaminant requires clinical correlation, susceptibility not performed unless requested by physician.       Blood Culture ID, PCR - Blood, Arm, Left [[id number]]  (Abnormal) Collected: 12/05/24 1755    Lab Status: Final result Specimen: Blood from Arm, Left Updated: 12/06/24 1617     BCID, PCR Staph spp, not aureus or lugdunensis. Identification by BCID2 PCR.     BCID, PCR 2 Streptococcus spp, not A, B, or pneumoniae. Identification by BCID2 PCR.     BOTTLE TYPE Aerobic Bottle    Narrative:      Staph Sp mecA/C    Blood Culture ID, PCR - Blood, Arm, Left [[id number]]  (Abnormal) Collected: 12/05/24 1755    Lab Status: Final result Specimen: Blood from Arm, Left Updated: 12/07/24 0943     BCID, PCR Staph spp, not aureus or lugdunensis. Identification by BCID2 PCR.     BCID, PCR 2 Streptococcus spp, not A, B, or pneumoniae. Identification by BCID2 PCR.     BOTTLE TYPE Anaerobic Bottle    Narrative:      S.epi with mecA/C    Blood Culture ID, PCR - Blood, Arm, Left [[id number]]  (Abnormal) Collected: 12/05/24 1755    Lab Status: Final result Specimen: Blood from Arm, Left Updated: 12/07/24 0913     BCID, PCR Streptococcus spp, not A, B, or pneumoniae. Identification by BCID2 PCR.     BCID, PCR 2 Candida glabrata. Identification by BCID2 PCR.     BOTTLE TYPE Anaerobic Bottle    Narrative:      Infectious disease consultation is highly recommended to rule out distant foci of infection.          [x]  Radiology  XR Chest 1 View    Result Date: 12/10/2024  1.No evidence of pneumothorax. 2.Improved aeration at the left lung base. 3.Persistent infiltrate at the right lung base. 4.Subcutaneous emphysema. Electronically Signed: Cristian Tiwari MD  12/10/2024 2:10 AM EST  Workstation ID: [id number]    XR Chest 1 View    Result Date: 12/9/2024  Impression: 1. No change in subcutaneous emphysema. 2. Slight improvement in patchy  bilateral airspace disease. 3. No change in right thoracostomy tube. No definite pneumothorax identified. Electronically Signed: Stephan Dey MD  12/9/2024 5:18 PM EST  Workstation ID: [id number]    XR Chest 1 View    Result Date: 12/9/2024  Impression: 1.Interval extubation. 2.Right-sided chest tube in unchanged position. No definite pneumothorax. 3.Patchy right greater than left airspace disease, similar to the prior examination. 4.Subcutaneous emphysema throughout the chest, right greater than left. This has mildly improved from the prior exam. Electronically Signed: Reynaldo Rangel  12/9/2024 9:35 AM EST  Workstation ID: [id number]    XR Chest 1 View    Result Date: 12/8/2024  1.No significant interval change. 2.Right-sided chest tube without evidence of pneumothorax. 3.Bilateral infiltrates, right worse than left. Electronically Signed: Cristian Tiwari MD  12/8/2024 6:11 AM EST  Workstation ID: [id number]    XR Chest 1 View    Result Date: 12/7/2024  1.Right chest tube has been pulled back slightly, but the side ports appear to be within the pleural space. No definite pneumothorax. 2.Extensive subcutaneous gas in the chest wall. 3.Stable bilateral infiltrates. Electronically Signed: Cristian Tiwari MD  12/7/2024 8:31 PM EST  Workstation ID: [id number]    XR Abdomen KUB    Result Date: 12/7/2024  Impression: Weighted tip feeding catheter projects over the stomach. Electronically Signed: Claus Sánchez MD  12/7/2024 1:08 PM EST  Workstation ID: [id number]    XR Chest 1 View    Result Date: 12/7/2024  Impression: 1.Endotracheal tube in good position. 2.Right-sided chest tube in place. No pneumothorax identified. 3.Diffuse bilateral subcutaneous emphysema appears slightly improved. 4.Patchy airspace disease throughout right lung appears unchanged. Electronically Signed: Winston Reyes MD  12/7/2024 9:25 AM EST  Workstation ID: [id number]    XR Chest 1 View    Result Date: 12/6/2024  Impression: 1.Endotracheal tube tip is 3  cm above the mel. 2.Slight change in position of the right-sided chest tube with increased subcutaneous emphysema tracking throughout both sides of the chest, right worse than left. Evaluation for pneumothorax is limited due to the subcutaneous emphysema. 3.Decreased right-sided airspace disease compared to prior exam. Electronically Signed: Reynaldo Rangel  12/6/2024 4:35 PM EST  Workstation ID: [id number]    XR Chest 1 View    Result Date: 12/6/2024  Impression: 1.Interval retraction of the endotracheal tube which now appears in more appropriate position overlying the mid thoracic trachea. 2.Mildly displaced right lateral seventh rib fracture. There are also mildly displaced left posterior second and third rib fractures. These are new from prior same day radiographs. 3.Persistent multifocal airspace opacities throughout the right lung with right greater than left pleural effusions. 4.Mild interval retraction of the right approach thoracostomy tube. Trace right apical pneumothorax. Increased right lateral chest wall emphysema. Electronically Signed: Devante Lang MD  12/6/2024 2:26 PM EST  Workstation ID: [id number]    XR Chest 1 View    Result Date: 12/6/2024  Impression: 1. Proximal right mainstem intubation. Retraction by 3.5 cm may place the ET tube in a more standard position. 2. Right thoracotomy tube without visible pneumothorax. Small volume right chest wall subcutaneous emphysema. 3. Multifocal airspace disease and/or atelectasis worsened in the right middle and upper lobes. Based on acuity this could reflect aspiration, hemorrhage or edema. 4. Right greater than left pleural effusions similar to may be slightly improved allowing for technical differences to prior chest radiograph. Findings communicated with ordering provider Vamshi Villafana via [employer] secure chat, message viewed at 12:13 p.m. 12/6/2024. Electronically Signed: Lorne Bustamante MD  12/6/2024 12:23 PM EST  Workstation ID: [id number]    XR Chest  1 View    Result Date: 12/6/2024  Impression: 1.Decreased right-sided pleural effusion status post thoracentesis. No pneumothorax. 2.Similar bilateral airspace opacities and small left pleural effusion. Electronically Signed: Devante Lang MD  12/6/2024 11:30 AM EST  Workstation ID: [id number]    CT Chest Without Contrast Diagnostic    Result Date: 12/6/2024  Impression: 1.Moderate right and small left pleural effusions with bibasilar consolidations, which could represent atelectasis, aspiration, or pneumonia. 2.Interlobular septal thickening bilaterally, suggesting mild pulmonary edema. 3.Cardiomegaly. 4.Erosive changes at T5-6 appear similar, suggesting ongoing discitis/osteomyelitis. Electronically Signed: Winston Reyes MD  12/6/2024 8:44 AM EST  Workstation ID: [id number]    XR Chest 1 View    Result Date: 12/5/2024  Impression: Similar moderate  CHF features. Electronically Signed: Jose Hammond MD  12/5/2024 4:35 PM EST  Workstation ID: [id number]   []  EKG/Telemetry   []  Cardiology/Vascular   []  Pathology  []  Old records  []  Other:    Assessment & Plan   Assessment / Plan     Status postcardiac arrest, secondary to hypoxia  Acute hypoxic/hypercapnic respiratory failure requiring mechanical ventilation  Hemoptysis  Altered mental status, concern for seizures  End-stage renal disease on dialysis  Acute on chronic diastolic heart failure  Staph epidermidis cystitis on vancomycin  Type 2 diabetes  Atrial fibrillation on Eliquis  Candidemia    Plan:  Patient remains admitted to the ICU for further care management  Pulmonary critical care, nephrology consulted, appreciate assistance  For concerns of seizure-like activity overnight, started empirically on Keppra and EEG has been ordered, will follow-up  Patient remains on pressors at this time, continue to wean as able  Chest x-ray this morning with no pneumothorax, removal of chest tube per pulmonology service  Patient remains on CRRT per nephrology service, plans  for conversion to intermittent hemodialysis per nephrology  Patient remains on Precedex drip, wean as able  Patient continues on micafungin for Candida fungal EMEA  Patient remains on vancomycin Staph epidermidis bacteremia.  Discussed with intensivist who discussed with ID stewardship.  Would like CT abdomen and pelvis as well as MRI of spine.  However given his respiratory status, do not feel he would tolerate being in scanner.  Continue to monitor daily  Patient's blood cultures from 12/7/2024 remain no growth today to 3 days  Continue NIPPV as tolerated  Tube feeds    Discussed with RN.  Discussed with intensivist, nephrologist    VTE Prophylaxis:  Pharmacologic & mechanical VTE prophylaxis orders are present.        CODE STATUS:   Level Of Support Discussed With: Next of Kin (If No Surrogate)  Code Status (Patient has no pulse and is not breathing): No CPR (Do Not Attempt to Resuscitate)  Medical Interventions (Patient has pulse or is breathing): Full Support

## 2024-12-11 ENCOUNTER — APPOINTMENT (OUTPATIENT)
Dept: GENERAL RADIOLOGY | Facility: HOSPITAL | Age: 79
End: 2024-12-11
Payer: MEDICARE

## 2024-12-11 LAB
ALBUMIN SERPL-MCNC: 3.5 G/DL (ref 3.5–5.2)
ALBUMIN SERPL-MCNC: 3.9 G/DL (ref 3.5–5.2)
ALBUMIN SERPL-MCNC: 3.9 G/DL (ref 3.5–5.2)
ALBUMIN/GLOB SERPL: 0.8 G/DL
ALP SERPL-CCNC: 94 U/L (ref 39–117)
ALT SERPL W P-5'-P-CCNC: 36 U/L (ref 1–41)
ANION GAP SERPL CALCULATED.3IONS-SCNC: 10.5 MMOL/L (ref 5–15)
ANION GAP SERPL CALCULATED.3IONS-SCNC: 13.7 MMOL/L (ref 5–15)
ANION GAP SERPL CALCULATED.3IONS-SCNC: 13.7 MMOL/L (ref 5–15)
ARTERIAL PATENCY WRIST A: ABNORMAL
AST SERPL-CCNC: 61 U/L (ref 1–40)
ATMOSPHERIC PRESS: 742.1 MMHG
BACTERIA SPEC AEROBE CULT: ABNORMAL
BACTERIA SPEC ANAEROBE CULT: NORMAL
BASE EXCESS BLDA CALC-SCNC: -4 MMOL/L (ref -2–2)
BASOPHILS # BLD AUTO: 0.09 10*3/MM3 (ref 0–0.2)
BASOPHILS # BLD MANUAL: 0.1 10*3/MM3 (ref 0–0.2)
BASOPHILS NFR BLD AUTO: 0.9 % (ref 0–1.5)
BASOPHILS NFR BLD MANUAL: 1 % (ref 0–1.5)
BDY SITE: ABNORMAL
BILIRUB SERPL-MCNC: 1.6 MG/DL (ref 0–1.2)
BUN BLDA-MCNC: 18 MG/DL (ref 8–23)
BUN SERPL-MCNC: 17 MG/DL (ref 8–23)
BUN SERPL-MCNC: 18 MG/DL (ref 8–23)
BUN SERPL-MCNC: 18 MG/DL (ref 8–23)
BUN/CREAT SERPL: 10.6 (ref 7–25)
BUN/CREAT SERPL: 11.6 (ref 7–25)
BUN/CREAT SERPL: 11.6 (ref 7–25)
CA-I BLDA-SCNC: 1.3 MMOL/L (ref 1.13–1.32)
CA-I BLDA-SCNC: 1.31 MMOL/L (ref 1.13–1.32)
CALCIUM SPEC-SCNC: 9.2 MG/DL (ref 8.6–10.5)
CALCIUM SPEC-SCNC: 9.7 MG/DL (ref 8.6–10.5)
CALCIUM SPEC-SCNC: 9.7 MG/DL (ref 8.6–10.5)
CHLORIDE BLDA-SCNC: 107 MMOL/L (ref 98–107)
CHLORIDE SERPL-SCNC: 102 MMOL/L (ref 98–107)
CHLORIDE SERPL-SCNC: 102 MMOL/L (ref 98–107)
CHLORIDE SERPL-SCNC: 105 MMOL/L (ref 98–107)
CO2 BLDA-SCNC: 22 MMOL/L (ref 23–27)
CO2 SERPL-SCNC: 19.3 MMOL/L (ref 22–29)
CO2 SERPL-SCNC: 19.3 MMOL/L (ref 22–29)
CO2 SERPL-SCNC: 21.5 MMOL/L (ref 22–29)
CREAT BLDA-MCNC: 1.49 MG/DL (ref 0.6–1.3)
CREAT SERPL-MCNC: 1.55 MG/DL (ref 0.76–1.27)
CREAT SERPL-MCNC: 1.55 MG/DL (ref 0.76–1.27)
CREAT SERPL-MCNC: 1.61 MG/DL (ref 0.76–1.27)
D-LACTATE SERPL-SCNC: 2.2 MMOL/L
DEPRECATED RDW RBC AUTO: 63.7 FL (ref 37–54)
DPYD GENE MUT ANL BLD/T: NEGATIVE
EGFRCR SERPLBLD CKD-EPI 2021: 43.5 ML/MIN/1.73
EGFRCR SERPLBLD CKD-EPI 2021: 45.5 ML/MIN/1.73
EGFRCR SERPLBLD CKD-EPI 2021: 45.5 ML/MIN/1.73
EOSINOPHIL # BLD AUTO: 0.34 10*3/MM3 (ref 0–0.4)
EOSINOPHIL # BLD MANUAL: 0.29 10*3/MM3 (ref 0–0.4)
EOSINOPHIL NFR BLD AUTO: 3.6 % (ref 0.3–6.2)
EOSINOPHIL NFR BLD MANUAL: 3 % (ref 0.3–6.2)
ERYTHROCYTE [DISTWIDTH] IN BLOOD BY AUTOMATED COUNT: 19.8 % (ref 12.3–15.4)
FUNGITELL VALUE: <31.25 PG/ML
GLOBULIN UR ELPH-MCNC: 5.1 GM/DL
GLUCOSE BLDC GLUCOMTR-MCNC: 127 MG/DL (ref 70–99)
GLUCOSE BLDC GLUCOMTR-MCNC: 149 MG/DL (ref 70–99)
GLUCOSE BLDC GLUCOMTR-MCNC: 164 MG/DL (ref 70–99)
GLUCOSE BLDC GLUCOMTR-MCNC: 172 MG/DL (ref 70–99)
GLUCOSE BLDC GLUCOMTR-MCNC: 207 MG/DL (ref 70–99)
GLUCOSE BLDC GLUCOMTR-MCNC: 222 MG/DL (ref 70–99)
GLUCOSE SERPL-MCNC: 220 MG/DL (ref 65–99)
GLUCOSE SERPL-MCNC: 236 MG/DL (ref 65–99)
GLUCOSE SERPL-MCNC: 236 MG/DL (ref 65–99)
GRAM STN SPEC: ABNORMAL
HCO3 BLDA-SCNC: 22.5 MMOL/L (ref 22–26)
HCT VFR BLD AUTO: 38.1 % (ref 37.5–51)
HCT VFR BLD CALC: 42 % (ref 38–51)
HEMODILUTION: NO
HGB BLD-MCNC: 11.4 G/DL (ref 13–17.7)
HGB BLDA-MCNC: 14.3 G/DL (ref 12–18)
IMM GRANULOCYTES # BLD AUTO: 0.25 10*3/MM3 (ref 0–0.05)
IMM GRANULOCYTES NFR BLD AUTO: 2.6 % (ref 0–0.5)
IMP & REVIEW OF LAB RESULTS: NORMAL
INHALED O2 CONCENTRATION: 32 %
ISOLATED FROM: ABNORMAL
LYMPHOCYTES # BLD AUTO: 1.69 10*3/MM3 (ref 0.7–3.1)
LYMPHOCYTES # BLD MANUAL: 2.01 10*3/MM3 (ref 0.7–3.1)
LYMPHOCYTES NFR BLD AUTO: 17.7 % (ref 19.6–45.3)
LYMPHOCYTES NFR BLD MANUAL: 9 % (ref 5–12)
Lab: NORMAL
Lab: NORMAL
MAGNESIUM SERPL-MCNC: 2.5 MG/DL (ref 1.6–2.4)
MCH RBC QN AUTO: 27.6 PG (ref 26.6–33)
MCHC RBC AUTO-ENTMCNC: 29.9 G/DL (ref 31.5–35.7)
MCV RBC AUTO: 92.3 FL (ref 79–97)
MODALITY: ABNORMAL
MONOCYTES # BLD AUTO: 1.32 10*3/MM3 (ref 0.1–0.9)
MONOCYTES # BLD: 0.86 10*3/MM3 (ref 0.1–0.9)
MONOCYTES NFR BLD AUTO: 13.8 % (ref 5–12)
MYELOCYTES NFR BLD MANUAL: 2 % (ref 0–0)
NEUTROPHILS # BLD AUTO: 6.12 10*3/MM3 (ref 1.7–7)
NEUTROPHILS NFR BLD AUTO: 5.88 10*3/MM3 (ref 1.7–7)
NEUTROPHILS NFR BLD AUTO: 61.4 % (ref 42.7–76)
NEUTROPHILS NFR BLD MANUAL: 64 % (ref 42.7–76)
NRBC BLD AUTO-RTO: 3.2 /100 WBC (ref 0–0.2)
NRBC SPEC MANUAL: 7 /100 WBC (ref 0–0.2)
PATHOLOGIST NAME: NORMAL
PAW @ PEAK INSP FLOW SETTING VENT: 16 CMH2O
PCO2 BLDA: 45.6 MM HG (ref 35–45)
PEEP RESPIRATORY: 8 CM[H2O]
PH BLDA: 7.3 PH UNITS (ref 7.35–7.45)
PHOSPHATE SERPL-MCNC: 2.4 MG/DL (ref 2.5–4.5)
PHOSPHATE SERPL-MCNC: 2.8 MG/DL (ref 2.5–4.5)
PLAT MORPH BLD: NORMAL
PLAT MORPH BLD: NORMAL
PLATELET # BLD AUTO: 148 10*3/MM3 (ref 140–450)
PMV BLD AUTO: 10.5 FL (ref 6–12)
PO2 BLD: 285 MM[HG] (ref 0–500)
PO2 BLDA: 91.3 MM HG (ref 80–100)
POTASSIUM BLDA-SCNC: 3.7 MMOL/L (ref 3.5–5)
POTASSIUM SERPL-SCNC: 3.8 MMOL/L (ref 3.5–5.2)
POTASSIUM SERPL-SCNC: 3.9 MMOL/L (ref 3.5–5.2)
POTASSIUM SERPL-SCNC: 3.9 MMOL/L (ref 3.5–5.2)
PROT SERPL-MCNC: 9 G/DL (ref 6–8.5)
RBC # BLD AUTO: 4.13 10*6/MM3 (ref 4.14–5.8)
RBC MORPH BLD: NORMAL
RBC MORPH BLD: NORMAL
REFERENCE VALUE: NORMAL
RESPIRATORY RATE: 10
SAO2 % BLDCOA: 96.1 % (ref 95–99)
SODIUM BLD-SCNC: 142 MMOL/L (ref 131–143)
SODIUM SERPL-SCNC: 135 MMOL/L (ref 136–145)
SODIUM SERPL-SCNC: 135 MMOL/L (ref 136–145)
SODIUM SERPL-SCNC: 137 MMOL/L (ref 136–145)
VANCOMYCIN SERPL-MCNC: 27.18 MCG/ML (ref 5–40)
VARIANT LYMPHS NFR BLD MANUAL: 21 % (ref 19.6–45.3)
WBC MORPH BLD: NORMAL
WBC MORPH BLD: NORMAL
WBC NRBC COR # BLD AUTO: 9.57 10*3/MM3 (ref 3.4–10.8)

## 2024-12-11 PROCEDURE — 83735 ASSAY OF MAGNESIUM: CPT | Performed by: INTERNAL MEDICINE

## 2024-12-11 PROCEDURE — 25010000002 ALBUMIN HUMAN 25% PER 50 ML: Performed by: INTERNAL MEDICINE

## 2024-12-11 PROCEDURE — P9047 ALBUMIN (HUMAN), 25%, 50ML: HCPCS | Performed by: INTERNAL MEDICINE

## 2024-12-11 PROCEDURE — 94799 UNLISTED PULMONARY SVC/PX: CPT

## 2024-12-11 PROCEDURE — 80202 ASSAY OF VANCOMYCIN: CPT | Performed by: STUDENT IN AN ORGANIZED HEALTH CARE EDUCATION/TRAINING PROGRAM

## 2024-12-11 PROCEDURE — 82330 ASSAY OF CALCIUM: CPT

## 2024-12-11 PROCEDURE — 94761 N-INVAS EAR/PLS OXIMETRY MLT: CPT

## 2024-12-11 PROCEDURE — 99291 CRITICAL CARE FIRST HOUR: CPT | Performed by: INTERNAL MEDICINE

## 2024-12-11 PROCEDURE — 63710000001 INSULIN LISPRO (HUMAN) PER 5 UNITS: Performed by: NURSE PRACTITIONER

## 2024-12-11 PROCEDURE — 82948 REAGENT STRIP/BLOOD GLUCOSE: CPT | Performed by: INTERNAL MEDICINE

## 2024-12-11 PROCEDURE — 74018 RADEX ABDOMEN 1 VIEW: CPT

## 2024-12-11 PROCEDURE — 80053 COMPREHEN METABOLIC PANEL: CPT | Performed by: INTERNAL MEDICINE

## 2024-12-11 PROCEDURE — 82948 REAGENT STRIP/BLOOD GLUCOSE: CPT | Performed by: NURSE PRACTITIONER

## 2024-12-11 PROCEDURE — 95819 EEG AWAKE AND ASLEEP: CPT | Performed by: PSYCHIATRY & NEUROLOGY

## 2024-12-11 PROCEDURE — 85025 COMPLETE CBC W/AUTO DIFF WBC: CPT | Performed by: STUDENT IN AN ORGANIZED HEALTH CARE EDUCATION/TRAINING PROGRAM

## 2024-12-11 PROCEDURE — 25010000002 MICAFUNGIN SODIUM 100 MG RECONSTITUTED SOLUTION 1 EACH VIAL: Performed by: INTERNAL MEDICINE

## 2024-12-11 PROCEDURE — 71045 X-RAY EXAM CHEST 1 VIEW: CPT

## 2024-12-11 PROCEDURE — 94660 CPAP INITIATION&MGMT: CPT

## 2024-12-11 PROCEDURE — 93005 ELECTROCARDIOGRAM TRACING: CPT | Performed by: INTERNAL MEDICINE

## 2024-12-11 PROCEDURE — 84100 ASSAY OF PHOSPHORUS: CPT | Performed by: INTERNAL MEDICINE

## 2024-12-11 PROCEDURE — 94664 DEMO&/EVAL PT USE INHALER: CPT

## 2024-12-11 PROCEDURE — 25010000002 LEVETRIRACETAM PER 10 MG: Performed by: INTERNAL MEDICINE

## 2024-12-11 PROCEDURE — 80047 BASIC METABLC PNL IONIZED CA: CPT

## 2024-12-11 PROCEDURE — 83605 ASSAY OF LACTIC ACID: CPT

## 2024-12-11 PROCEDURE — 99232 SBSQ HOSP IP/OBS MODERATE 35: CPT | Performed by: INTERNAL MEDICINE

## 2024-12-11 PROCEDURE — 82803 BLOOD GASES ANY COMBINATION: CPT | Performed by: INTERNAL MEDICINE

## 2024-12-11 PROCEDURE — 85007 BL SMEAR W/DIFF WBC COUNT: CPT | Performed by: STUDENT IN AN ORGANIZED HEALTH CARE EDUCATION/TRAINING PROGRAM

## 2024-12-11 PROCEDURE — 82948 REAGENT STRIP/BLOOD GLUCOSE: CPT

## 2024-12-11 RX ORDER — MIDODRINE HYDROCHLORIDE 10 MG/1
10 TABLET ORAL
Status: DISCONTINUED | OUTPATIENT
Start: 2024-12-11 | End: 2024-12-22

## 2024-12-11 RX ORDER — ACETAMINOPHEN 325 MG/1
650 TABLET ORAL EVERY 6 HOURS PRN
Status: DISCONTINUED | OUTPATIENT
Start: 2024-12-11 | End: 2024-12-11

## 2024-12-11 RX ORDER — ACETAMINOPHEN 325 MG/1
650 TABLET ORAL EVERY 6 HOURS PRN
Status: DISCONTINUED | OUTPATIENT
Start: 2024-12-11 | End: 2024-12-15

## 2024-12-11 RX ORDER — ALBUMIN (HUMAN) 12.5 G/50ML
25 SOLUTION INTRAVENOUS
Status: COMPLETED | OUTPATIENT
Start: 2024-12-11 | End: 2024-12-11

## 2024-12-11 RX ADMIN — Medication 10 ML: at 20:03

## 2024-12-11 RX ADMIN — Medication 10 ML: at 09:36

## 2024-12-11 RX ADMIN — CALCIUM CHLORIDE, MAGNESIUM CHLORIDE, SODIUM CHLORIDE, SODIUM BICARBONATE, POTASSIUM CHLORIDE AND SODIUM PHOSPHATE DIBASIC DIHYDRATE 500 ML/HR: 3.68; 3.05; 6.34; 3.09; .314; .187 INJECTION INTRAVENOUS at 06:09

## 2024-12-11 RX ADMIN — INSULIN LISPRO 3 UNITS: 100 INJECTION, SOLUTION INTRAVENOUS; SUBCUTANEOUS at 06:25

## 2024-12-11 RX ADMIN — ARFORMOTEROL TARTRATE 15 MCG: 15 SOLUTION RESPIRATORY (INHALATION) at 07:40

## 2024-12-11 RX ADMIN — ALBUMIN (HUMAN) 25 G: 0.25 INJECTION, SOLUTION INTRAVENOUS at 09:04

## 2024-12-11 RX ADMIN — ALBUMIN (HUMAN) 25 G: 0.25 INJECTION, SOLUTION INTRAVENOUS at 09:35

## 2024-12-11 RX ADMIN — BUDESONIDE 0.5 MG: 0.5 INHALANT ORAL at 07:40

## 2024-12-11 RX ADMIN — BUDESONIDE 0.5 MG: 0.5 INHALANT ORAL at 20:43

## 2024-12-11 RX ADMIN — LEVETIRACETAM 500 MG: 100 INJECTION, SOLUTION INTRAVENOUS at 09:03

## 2024-12-11 RX ADMIN — MICAFUNGIN SODIUM 100 MG: 100 INJECTION, POWDER, LYOPHILIZED, FOR SOLUTION INTRAVENOUS at 09:03

## 2024-12-11 RX ADMIN — INSULIN LISPRO 2 UNITS: 100 INJECTION, SOLUTION INTRAVENOUS; SUBCUTANEOUS at 00:45

## 2024-12-11 RX ADMIN — LEVOTHYROXINE SODIUM 175 MCG: 0.03 TABLET ORAL at 06:25

## 2024-12-11 RX ADMIN — LEVETIRACETAM 500 MG: 100 INJECTION, SOLUTION INTRAVENOUS at 20:03

## 2024-12-11 RX ADMIN — ARFORMOTEROL TARTRATE 15 MCG: 15 SOLUTION RESPIRATORY (INHALATION) at 20:43

## 2024-12-11 NOTE — PROGRESS NOTES
RT EQUIPMENT DEVICE RELATED - SKIN ASSESSMENT    RT Medical Equipment/Device:     NIV Mask:  Under-the-nose   size:  A    Skin Assessment:      Cheek:  Intact  Nose:  Intact  Mouth:  Intact    Device Skin Pressure Protection:  Pressure points protected    Nurse Notification:  Kate Peraza, RRT

## 2024-12-11 NOTE — SIGNIFICANT NOTE
12/11/24 0529   OTHER   Discipline occupational therapist   Rehab Time/Intention   Session Not Performed   (restraints)

## 2024-12-11 NOTE — PROGRESS NOTES
[facility]     Progress Note    Patient Name: Nelson Wang  : 1945  MRN: [id number]  Primary Care Physician:  Domingo Bravo MD  Date of admission: 2024    Subjective   Subjective     Chief Complaint:   Patient is critically ill status post cardiac arrest with hemoptysis, hypoxic respiratory failure, likely hypoxic arrest, with acute hypoxic respiratory failure, acute on chronic congestive diastolic heart failure with EF of 50 to 60%, grade 1 diastolic dysfunction, acute cardiogenic pulmonary edema loculated right-sided pleural effusion, ESRD on hemodialysis, GASTON with home CPAP.    Able to wean off Airvo to nasal cannula and wearing BiPAP overnight  Robust removal of fluids of CRRT over the last few days.  Volume status is better but now hypotensive this morning  On higher dose of Levophed and receiving albumin  He is more awake this morning but appears very encephalopathic.  Does grimace to pain.  There is the most awake I have seen him since he has been hospitalized.  That being said he is not following commands  EEG from yesterday pending  Chest tube removed yesterday.  X-ray this morning looks stable  Did pull out Cortrak overnight  Remains on vancomycin and micafungin  Repeat blood cultures negative  Status is overall tenuous but he is slowly improving        Objective   Objective     Vitals:   Temp:  [92.8 °F (33.8 °C)-99.9 °F (37.7 °C)] 97.5 °F (36.4 °C)  Heart Rate:  [] 156  Resp:  [14-30] 22  BP: ()/(31-94) 139/55  Arterial Line BP: ()/(25-58) 113/56  Flow (L/min) (Oxygen Therapy):  [6-60] 6    Physical Exam   Vital Signs Reviewed  Critically ill, more awake but agitated, very confused but thrashing about  HEENT:  PERRL.  OP clear  Chest:  poor aeration, diminished breath sounds, resonant to percussion b/l, no increased work of breathing noted, decreasing subcutaneous emphysema right chest  CV: RRR, no MGR, pulses 2+, equal  Abd:  Soft, NT, ND, + BS, no HSM  EXT:  no  clubbing, no cyanosis, No BLE edema, left arm AV fistula in place  Neuro: Grimaces to pain moves all extremities, cranial nerves intact, more awake but very confused   skin: No rashes or lesions noted      Result Review    Result Review:  I have personally reviewed the results from the time of this admission to 12/11/2024 10:14 EST and agree with these findings:  [x]  Laboratory  [x]  Microbiology  [x]  Radiology  [x]  EKG/Telemetry   [x]  Cardiology/Vascular   []  Pathology  []  Old records  []  Other:  Most notable findings include:       Lab 12/11/24  0611 12/11/24  0431 12/11/24  0008 12/10/24  1814 12/10/24  1221 12/10/24  0620 12/09/24  2318 12/09/24  1830 12/09/24  1220 12/09/24  0622 12/08/24  0410 12/07/24  2346 12/07/24  1154 12/07/24  0606 12/07/24  0245 12/06/24  1248 12/06/24  1238 12/06/24  0748 12/05/24  1635   WBC 9.57  --   --   --   --  5.77  --   --   --  5.23  --  6.95  --  6.28  --   --  5.95 3.73 4.13   HEMOGLOBIN 11.4*  --   --   --   --  9.1*  --   --   --  8.8*  --  9.3*  --  9.3* 9.3*  --  6.9* 8.1* 8.4*   HEMATOCRIT 38.1  --   --   --   --  30.5*  --   --   --  29.5*  --  30.7*  --  30.8* 30.5*  --  24.6* 28.2* 29.0*   PLATELETS 148  --   --   --   --  124*  --   --   --  115*  --  143  --  109*  --   --  136* 141 132*   SODIUM 135*  135*  --  137 138 138 136 139 139   < > 136   < > 136   < > 136  136  --    < > 144 134* 137   POTASSIUM 3.9  3.9  --  3.8 3.9 3.9 4.1 3.9 4.2   < > 4.4   < > 4.6   < > 4.4  4.4  --    < > 3.6 4.1 3.9   CHLORIDE 102  102  --  105 105 108* 103 107 107   < > 102   < > 104   < > 103  103  --    < > 101 95* 97*   CO2 19.3*  19.3*  --  21.5* 22.7 21.5* 20.3* 22.3 21.1*   < > 20.9*   < > 19.7*   < > 22.1  22.1  --    < > 36.5* 28.3 30.6*   BUN 18  18  --  17 16 17 17 19 21   < > 20   < > 10   < > 9  9  --    < > 16 15 12   CREATININE 1.55*  1.55* 1.49* 1.61* 1.73* 1.68* 1.75* 1.82* 2.09*   < > 2.16*   < > 1.39*   < > 1.81*  1.81*  --    < > 4.90* 4.53*  3.59*   GLUCOSE 236*  236*  --  220* 181* 148* 188* 160* 148*   < > 136*   < > 138*   < > 75  75  --    < > 125* 86 107*   CALCIUM 9.7  9.7  --  9.2 8.9 8.8 8.6 8.5* 8.4*   < > 9.1   < > 8.3*   < > 8.3*  8.3*  --    < > 10.1 9.1 9.4   PHOSPHORUS 2.8  --  2.4* 2.6 2.6 2.7 3.0 3.4   < > 4.0   < > 4.4   < > 3.5  --    < > 5.7*  --   --    TOTAL PROTEIN 9.0*  --   --   --   --   --   --   --   --   --   --   --   --  6.4  --   --   --  7.0 7.4   ALBUMIN 3.9  3.9  --  3.5 3.3* 3.0* 3.1* 2.9* 3.0*   < > 3.2*   < > 2.8*   < > 2.8*  2.8*  --    < > 2.7* 3.0* 3.3*   GLOBULIN 5.1  --   --   --   --   --   --   --   --   --   --   --   --  3.6  --   --   --  4.0 4.1    < > = values in this interval not displayed.     XR Chest 1 View    Result Date: 12/11/2024  XR CHEST 1 VW Date of Exam: 12/11/2024 4:45 AM EST Indication: Decreased oxygen saturation Comparison: 12/10/2024 Findings: Heart size is stable. Feeding tube tip is not seen but it is below the diaphragm. Right chest tube has been removed. No pneumothorax on either side of the chest. There is a persistent pleural effusion with bibasilar consolidations, right worse than left.  Subcutaneous emphysema is noted on both sides of the chest.     Impression: 1.Interval removal of right chest tube without evidence of pneumothorax. 2.Persistent pleural effusions with bibasilar consolidations, right worse than left. 3.Subcutaneous emphysema. Electronically Signed: Cristian Tiwari MD  12/11/2024 5:15 AM EST  Workstation ID: [id number]    XR Chest 1 View    Result Date: 12/10/2024  XR CHEST 1 VW Date of Exam: 12/10/2024 2:00 AM EST Indication: RECHECK FOR PNEUMO Comparison: 12/9/2024 Findings: Feeding tube tip is below the diaphragm but not seen. Heart remains enlarged. Right chest tube remains in place. No pneumothorax identified on either side of the chest. There is continued infiltrate at the right base. There is improved aeration at the left lung base. Subcutaneous emphysema  is again seen on both sides of the chest.     Impression: 1.No evidence of pneumothorax. 2.Improved aeration at the left lung base. 3.Persistent infiltrate at the right lung base. 4.Subcutaneous emphysema. Electronically Signed: Cristian Tiwari MD  12/10/2024 2:10 AM EST  Workstation ID: [id number]    XR Chest 1 View    Result Date: 12/9/2024  XR CHEST 1 VW Date of Exam: 12/9/2024 4:56 PM EST Indication: Change in breathing, increased coughing Comparison: 12/9/2020 Findings: Nasogastric feeding tube remains in place and unchanged in position. Heart size and pulmonary vessels are within normal limits. Again seen is a large amount of subcutaneous emphysema throughout the chest. This appears similar to the prior study. Right-sided thoracostomy tube remains in place and unchanged in position. No definite pneumothorax identified. There is patchy bilateral airspace disease which is minimally improved from prior study. Lung volumes remain low. No definite pleural effusion.     Impression: Impression: 1. No change in subcutaneous emphysema. 2. Slight improvement in patchy bilateral airspace disease. 3. No change in right thoracostomy tube. No definite pneumothorax identified. Electronically Signed: Stephan Dey MD  12/9/2024 5:18 PM EST  Workstation ID: [id number]    XR Chest 1 View    Result Date: 12/9/2024  XR CHEST 1 VW Date of Exam: 12/9/2024 9:14 AM EST Indication: follow up Comparison: Chest radiograph dated 12/8/2024 Findings: Patient has been extubated. There is a smallbore feeding tube which courses below the left hemidiaphragm and out of view. There is a right-sided chest tube in unchanged position. There is subcutaneous emphysema throughout the chest, right greater than left. This appears mildly diminished from the prior examination. There are patchy right greater than left airspace disease. No definite pneumothorax. There are degenerative changes of the thoracic spine.     Impression: Impression: 1.Interval  extubation. 2.Right-sided chest tube in unchanged position. No definite pneumothorax. 3.Patchy right greater than left airspace disease, similar to the prior examination. 4.Subcutaneous emphysema throughout the chest, right greater than left. This has mildly improved from the prior exam. Electronically Signed: Reynaldo Rangel  12/9/2024 9:35 AM EST  Workstation ID: [id number]     Results for orders placed during the hospital encounter of 12/05/24    Adult Transthoracic Echo Complete W/ Cont if Necessary Per Protocol    Interpretation Summary    The quality of the study is limited due to limited views obtained    Left ventricular systolic function is normal. Calculated left ventricular EF = 57.8%    The right ventricular cavity is mildly dilated.    The left atrial cavity is mildly dilated.    There is mild calcification of the aortic valve no evidence of vegetation seen    Blood cultures with Candida and Staph epidermidis  Repeat blood cultures negative      Assessment & Plan   Assessment / Plan       Active Hospital Problems:  Active Hospital Problems    Diagnosis     **Hypoxia    Status post cardiac arrest secondary to hypoxia  Acute hypoxemic and hypercapnic respiratory failure require mechanical ventilation  Hemoptysis  Concern for intraparenchymal bleed   Status post thoracentesis  Candidemia  Acute on chronic hypoxic respiratory failure  Acute cardiogenic pulmonary edema  End-stage renal disease on dialysis leading to heart failure  Acute diastolic heart failure with EF of 56-60  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  I will give 50 g IV albumin and wean norepinephrine to keep mean arterial  pressure greater than 65  Appreciate nephrology input.  Will discontinue CRRT.  Will begin IHD tomorrow via fistula  Hopefully can remove temporary dialysis catheter and arterial line tomorrow if continues to improve  Mental status tenuous.  EEG pending.  Appears more consistent with encephalopathy.  I think we would have to sedate him for an MRI so I will hold off on that at this time.  Neurologically his exam appears to be improving every day.  Will continue Keppra for now  Would keep off of Precedex drip and limit sedation is much as possible  Continue BiPAP at night nasal cannula during the day.  Wean O2 to keep SPO2 greater than 90%  Chest tube removed yesterday.  X-ray this morning stable  On day 5 of micafungin for Candida fungemia.  Repeat cultures negative  On day 5 of vancomycin for Staph epidermidis bacteremia.  Recently completed 6 weeks of antibiotics for Staph epidermidis discitis.  Per ID, they would like a CT of the abdomen and pelvis as well as a MRI of the T and L-spine.  Repeat blood cultures negative.  I think for us to do any of this imaging we would have to sedate him in his current state.  I think that would make his respiratory status much more tenuous.  At this point we will monitor over time and image when clinically stable  Transfuse for hemoglobin less than 7  Continue  Brovana, Pulmicort and add DuoNeb.  Bronchoscopy cultures negative so far.  Place Cortrak today.  Tube feeds at goal per dietitian recommendations  DNR for now.  Family okay with intubation    Discussed with family at bedside    VTE Prophylaxis:  Pharmacologic & mechanical VTE prophylaxis orders are present.    CODE STATUS:   Level Of Support Discussed With: Next of Kin (If No Surrogate)  Code Status (Patient has no pulse and is not breathing): No CPR (Do Not Attempt to Resuscitate)  Medical Interventions (Patient has pulse or is breathing): Full Support    Discussed with family at bedside    Patient is critically ill in  ICU with status post cardiac arrest, hypoxic arrest, hemoptysis, hypovolemic shock, ESRD, respiratory failure, altered mental status, bacteremia and fungemia. I spent 34 minutes of critical care time excluding any procedure notes, spent in review, analysis, obtaining history and physical, formulating care plan, and I led multi-disciplinary critical care rounds with bedside nurse, respiratory therapist, clinical pharmacist and other allied services. I have discussed the case with primary service and other consultants as well.     Electronically signed by Scottie Valentin MD, 12/11/2024, 10:14 EST.

## 2024-12-11 NOTE — PLAN OF CARE
Goal Outcome Evaluation:      Patient had prolonged period of hypotension that was not responding to increase in pressers, discovered reason for the hypotension was vagal maneuver resulting in large bowel movement. Patient was very agitated with bipap throughout the night, when placed on NC patient rested much more comfortably. JOANIE RN

## 2024-12-11 NOTE — SIGNIFICANT NOTE
Wound Eval / Progress Noted     Nate     Patient Name: Nelson Wang  : 1945  MRN: [id number]  Today's Date: 2024                 Admit Date: 2024    Visit Dx:    ICD-10-CM ICD-9-CM   1. Acute respiratory failure with hypoxia  J96.01 518.81   2. Acute pulmonary edema  J81.0 518.4         Hypoxia        Past Medical History:   Diagnosis Date    Abnormal stress test     Anemia     IRON INFUSIONS WITH DIALYSIS    Anesthesia     WITH HIP REPLACEMENT DAUGHTER BELIEVES HAD SVT     Ankle pain, right     Anxiety and depression     Arthritis     CKD (chronic kidney disease), stage IV     DIALYSIS [id number] SHILEY IN RIGHT CHEST    Diabetes     GERD (gastroesophageal reflux disease)     Gout     High cholesterol     History of peritoneal dialysis     HL (hearing loss)     Hyperkalemia     Hypertension     Hypothyroidism     Insomnia     Night terrors     Psoriasis     Psoriasis     Sleep apnea     Sleep walking     Thrombocytopenia     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    Vitiligo         Past Surgical History:   Procedure Laterality Date    ABDOMINAL SURGERY      ANKLE SURGERY Right 1990    APPENDECTOMY N/A     ARTERIOVENOUS FISTULA/SHUNT SURGERY Left 2024    Procedure: Creation of left arm arteriovenous fistula;  Surgeon: Moses Mir MD;  Location: [facility] MAIN OR;  Service: Vascular;  Laterality: Left;    BRONCHOSCOPY N/A 2024    Procedure: BRONCHOSCOPY WITH BAL AND WASHINGS;  Surgeon: Sandra Espinal MD;  Location: [facility] ENDOSCOPY;  Service: Pulmonary;  Laterality: N/A;  PNEUMONIA    BRONCHOSCOPY N/A 2024    Procedure: BRONCHOSCOPY WITH BALS AND WASHINGS;  Surgeon: Sandra Espinal MD;  Location: [facility] ENDOSCOPY;  Service: Pulmonary;  Laterality: N/A;  MUCOUS PLUGGING    CARDIAC CATHETERIZATION N/A 2024    Procedure: Left Heart Cath with possible coronary angioplasty;  Surgeon: Stephan Nichols MD;  Location: [facility] CATH INVASIVE LOCATION;   Service: Cardiology;  Laterality: N/A;    COLONOSCOPY N/A 06/2022    [facility]    ENDOSCOPY N/A 10/28/2024    Procedure: ESOPHAGOGASTRODUODENOSCOPY INSERTION OF LIGHTED INSTRUMENT TO VIEW ESOPHAGUS, STOMACH AND SMALL INTESTINE;  Surgeon: Kingsley Peraza MD;  Location: [facility] ENDOSCOPY;  Service: Gastroenterology;  Laterality: N/A;  Gastritis    FRACTURE SURGERY      HIP BIPOLAR REPLACEMENT Right 01/2000    INSERTION HEMODIALYSIS CATHETER Left 04/09/2024    Procedure: HEMODIALYSIS CATHETER INSERTION;  Surgeon: Jose Berry MD;  Location: [facility] OR;  Service: General;  Laterality: Left;    INSERTION HEMODIALYSIS CATHETER N/A 04/12/2024    Procedure: Tunneled hemodialysis catheter insertion;  Surgeon: Enrique Vinson MD;  Location: [facility] OR;  Service: Vascular;  Laterality: N/A;    INSERTION PERITONEAL DIALYSIS CATHETER N/A 03/27/2023    Procedure: LAPAROSCOPIC INSERTION PERITONEAL DIALYSIS CATHETER, LAPAROSCOPIC OMENTOPEXY WITH LYSIS OF ADHESIONS;  Surgeon: Jose Berry MD;  Location: [facility] OR;  Service: General;  Laterality: N/A;    INSERTION PERITONEAL DIALYSIS CATHETER Left 07/23/2023    Procedure: REVISION OF PERITONEAL DIALYSIS CATHETER;  Surgeon: Radha Oreilly MD;  Location: [facility] OR;  Service: General;  Laterality: Left;    JOINT REPLACEMENT      REMOVAL PERITONEAL DIALYSIS CATHETER N/A 04/09/2024    Procedure: REMOVAL PERITONEAL DIALYSIS CATHETER;  Surgeon: Jose Berry MD;  Location: [facility] OR;  Service: General;  Laterality: N/A;    RENAL BIOPSY Left 07/15/2022    UPPER GASTROINTESTINAL ENDOSCOPY      VASCULAR SURGERY      WRIST SURGERY      UNSURE WHICH SIDE DAUGHTER REPORTS HAD SEVERE  CUT FROM WINDOW AND THEY HAD TO DO RECONSTRUCTIVE SURGERY WITH VESSELS AND NERVES         Physical Assessment:  Wound 12/05/24 2331 Left anterior knee abrasion (Active)   Wound Image   12/11/24 0926   Dressing Appearance dry;intact 12/11/24  0926   Closure None 12/11/24 0926   Base dry;red 12/11/24 0926   Periwound dry;intact 12/11/24 0926   Periwound Temperature warm 12/11/24 0926   Periwound Skin Turgor soft 12/11/24 0926   Edges rolled/closed 12/11/24 0926   Drainage Amount none 12/11/24 0926   Care, Wound cleansed with;sterile normal saline 12/11/24 0926   Dressing Care dressing applied;petroleum-based;non-adherent;gauze;silicone border foam 12/11/24 0926   Periwound Care absorptive dressing applied 12/11/24 0926       Wound 12/05/24 2331 Right lower leg abrasion (Active)   Wound Image   12/11/24 0926   Dressing Appearance dry;intact 12/11/24 0926   Closure None 12/11/24 0926   Base dry;red 12/11/24 0926   Periwound dry;intact 12/11/24 0926   Periwound Temperature warm 12/11/24 0926   Periwound Skin Turgor soft 12/11/24 0926   Edges rolled/closed 12/11/24 0926   Drainage Amount none 12/11/24 0926   Care, Wound cleansed with;sterile normal saline 12/11/24 0926   Dressing Care dressing applied;petroleum-based;non-adherent;gauze;silicone border foam 12/11/24 0926   Periwound Care absorptive dressing applied 12/11/24 0926       Wound 12/06/24 1440 Left lower leg skin tear (Active)   Wound Image   12/11/24 0926   Dressing Appearance dry;intact 12/11/24 0926   Closure None 12/11/24 0926   Base dry;red 12/11/24 0926   Periwound dry;intact 12/11/24 0926   Periwound Temperature warm 12/11/24 0926   Periwound Skin Turgor soft 12/11/24 0926   Edges rolled/closed 12/11/24 0926   Drainage Amount none 12/11/24 0926   Care, Wound cleansed with;sterile normal saline 12/11/24 0926   Dressing Care dressing applied;petroleum-based;non-adherent;gauze;silicone border foam 12/11/24 0926   Periwound Care absorptive dressing applied 12/11/24 0926      Wound Check / Follow-up: Patient seen today for wound follow-up. Patient remains in SICU. Primary RN present at bedside.     Resolving abrasions remain to left knee, left anterior lower leg, and right anterior lower leg. Dry,  red crusted tissue remains. Periwound tissue to all areas is dry and intact. Cleansed with normal saline and gauze. Applied non-adherent petroleum based gauze then secured with silicone border dressing. Recommending to continue daily dressing changes.    Bilateral gluteal aspects and bilateral heels intact without discoloration.    Recommending to continue pressure reduction measures including every two hour turns and offloading heels at all times.      Impression: Resolving abrasions.      Short term goals: Regain skin integrity, skin protection, pressure reduction, daily dressing changes.      Mary Guillen RN    12/11/2024    12:20 EST

## 2024-12-11 NOTE — TELEPHONE ENCOUNTER
"Spoke with patient today in regard to smoking cessation progress for 6 month telephone follow up, he states not tobacco free.  Patient states he keeps cigarettes in the house and his last cigarette was two days ago.  Patient feels he is "doing good" with his smoking and is not interested in returning to the program at this time.  Informed patient of benefit period and contact information if any further help or support is needed.  Will complete smart form for 6 month follow up on Quit attempt #1.      " I spoke to patient and gave an arrival time of 7:30 on 05/29/24 for [facility]. Patient was instructed to have a  for the day of the procedure and to arrive at the [facility] registration area. Patient was educated about stent placement and informed that in the event of stent placement, the patient will be required to stay overnight for observation. Patient was instructed to continue all medications as usual. Patient was instructed to be NPO after midnight with sips of water as needed. Patient was instructed to have labs completed 24-48 hours prior to procedure. Patient is agreeable with no other questions or concerns.

## 2024-12-11 NOTE — PROGRESS NOTES
[facility]   Hospitalist Progress Note  Date: 2024  Patient Name: Nelson Wang  : 1945  MRN: [id number]  Date of admission: 2024  Room/Bed: [facility]      Subjective   Subjective     Chief Complaint: Shortness of breath    Summary:    Nelson Wang is a 79 y.o. 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  Patient was started on supplemental oxygen to maintain his oxygen saturations.  He was given a dose of Bumex overnight.  His chest x-ray was consistent with bilateral pleural effusions.  Pulmonology was consulted for the bilateral pleural effusions and and nephrology consulted for possible volume overload.  Pulmonology was performing a thoracentesis this morning.  Thoracentesis was showing bloody fluid.  Patient suffered CODE BLUE. Likely bleeding into his airways causing hypoxic arrest.  He received CPR for 6 minutes and achieved ROSC.  He was transferred to the ICU, intubated and on blood pressure support.  Bronchoscopy was showing blood clots in his airways.  Patient was started on CRRT.  Patient's medical status is tenuous.  He is currently on 1 pressor. CRRT is removing fluid at a slow rate.  Patient started on micafungin on 2024.  Patient extubated on 2024.  Patient is on high flow nasal cannula 30 L 25%.     Interval Followup:   Patient more awake this morning on rounds.  However still not interactive.  EEG returned with no seizures.  Patient continues on CRRT at this time, however plans for transitioning to hemodialysis potentially tomorrow.  Patient pulled out his core track overnight.  Able to discuss with family at bedside on rounds this morning.  Objective   Objective     Vitals:   Temp:  [92.8 °F (33.8 °C)-99.9 °F (37.7 °C)] 98.4 °F (36.9 °C)  Heart Rate:  [] 102  Resp:  [16-30] 24  BP: ()/()  137/55  Arterial Line BP: ()/(26-70) 132/50  Flow (L/min) (Oxygen Therapy):  [6-7] 6    Physical Exam   General: Moves spontaneously, not interacting appropriately, more awake on rounds  Cardiovascular: RRR, no murmurs   Pulmonary: High flow nasal cannula in place, coarse breath sounds  Gastrointestinal: Abdomen is soft  Musculoskeletal: No lower extremity edema  Neuro: Unable to assess mental status    Result Review    Result Review:  I have personally reviewed these results:  [x]  Laboratory      Lab 12/11/24  0611 12/11/24  0431 12/10/24  0620 12/09/24  0622 12/06/24  1248 12/06/24  1238 12/06/24  1206 12/06/24  0748   WBC 9.57  --  5.77 5.23   < > 5.95  --  3.73   HEMOGLOBIN 11.4*  --  9.1* 8.8*   < > 6.9*  --  8.1*   HEMATOCRIT 38.1  --  30.5* 29.5*   < > 24.6*  --  28.2*   PLATELETS 148  --  124* 115*   < > 136*  --  141   NEUTROS ABS 5.88  6.12  --  3.57 3.82   < > 1.40*  --  1.66*   IMMATURE GRANS (ABS) 0.25*  --  0.05 0.03   < > 0.10*  --  0.01   LYMPHS ABS 1.69  --  1.14 0.76   < > 3.88*  --  1.41   MONOS ABS 1.32*  --  0.71 0.55   < > 0.48  --  0.53   EOS ABS 0.34  0.29  --  0.24 0.03   < > 0.07  --  0.09   MCV 92.3  --  91.3 91.6   < > 96.5  --  94.9   PROCALCITONIN  --   --   --   --   --   --   --  0.27*   LACTATE  --  2.2* 1.3 1.1   < > 3.9*   < >  --    PROTIME  --   --   --   --   --  15.2*  --   --    APTT  --   --   --   --   --  31.1  --   --     < > = values in this interval not displayed.         Lab 12/11/24  0611 12/11/24  0431 12/11/24  0008 12/10/24  1814 12/10/24  1221 12/10/24  0620 12/09/24  1220 12/09/24  0622   SODIUM 135*  135*  --  137 138   < > 136   < > 136   POTASSIUM 3.9  3.9  --  3.8 3.9   < > 4.1   < > 4.4   CHLORIDE 102  102  --  105 105   < > 103   < > 102   CO2 19.3*  19.3*  --  21.5* 22.7   < > 20.3*   < > 20.9*   ANION GAP 13.7  13.7  --  10.5 10.3   < > 12.7   < > 13.1   BUN 18  18  --  17 16   < > 17   < > 20   CREATININE 1.55*  1.55* 1.49* 1.61* 1.73*   <  > 1.75*   < > 2.16*   EGFR 45.5*  45.5*  --  43.5* 39.9*   < > 39.4*   < > 30.6*   GLUCOSE 236*  236*  --  220* 181*   < > 188*   < > 136*   CALCIUM 9.7  9.7  --  9.2 8.9   < > 8.6   < > 9.1   MAGNESIUM 2.5*  --   --   --   --  2.4  --  2.6*   PHOSPHORUS 2.8  --  2.4* 2.6   < > 2.7   < > 4.0    < > = values in this interval not displayed.         Lab 12/11/24  0611 12/11/24  0008 12/10/24  1814 12/07/24  1154 12/07/24  0606 12/06/24  1238 12/06/24  0748   TOTAL PROTEIN 9.0*  --   --   --  6.4  --  7.0   ALBUMIN 3.9  3.9 3.5 3.3*   < > 2.8*  2.8*   < > 3.0*   GLOBULIN 5.1  --   --   --  3.6  --  4.0   ALT (SGPT) 36  --   --   --  47*  --  9   AST (SGOT) 61*  --   --   --  138*  --  28   BILIRUBIN 1.6*  --   --   --  1.2  --  0.5   ALK PHOS 94  --   --   --  63  --  69    < > = values in this interval not displayed.         Lab 12/06/24  1238 12/06/24  0748 12/05/24  1854 12/05/24  1635   PROBNP  --   --   --  59,503.0*   HSTROP T  --  128* 113* 103*   PROTIME 15.2*  --   --   --    INR 1.17*  --   --   --              Lab 12/06/24  1507   ABO TYPING AB   RH TYPING Positive   ANTIBODY SCREEN Negative         Lab 12/11/24  0431 12/08/24  0700 12/07/24  0912   PH, ARTERIAL 7.302* 7.309* 7.376   PCO2, ARTERIAL 45.6* 41.1 32.8*   PO2 ART 91.3 69.4* 91.0   O2 SATURATION ART 96.1 91.9* 97.0   FIO2 32 35 40   HCO3 ART 22.5 20.6* 19.2*   BASE EXCESS ART -4.0* -5.3* -5.3*     Brief Urine Lab Results  (Last result in the past 365 days)        Color   Clarity   Blood   Leuk Est   Nitrite   Protein   CREAT   Urine HCG        10/27/24 1417 Yellow   Cloudy   Negative   Trace   Negative   >=300 mg/dL (3+)                 [x]  Microbiology   Microbiology Results (last 10 days)       Procedure Component Value - Date/Time    Blood Culture - Blood, Blood, Central Line [[id number]]  (Normal) Collected: 12/07/24 1023    Lab Status: Preliminary result Specimen: Blood, Central Line Updated: 12/11/24 1031     Blood Culture No growth at 4  days    Blood Culture - Blood, Blood, Arterial Line [[id number]]  (Normal) Collected: 12/07/24 1023    Lab Status: Preliminary result Specimen: Blood, Arterial Line Updated: 12/11/24 1046     Blood Culture No growth at 4 days    Pneumonia Panel - Lavage, Lung, Right Lower Lobe [[id number]]  (Normal) Collected: 12/06/24 1719    Lab Status: Final result Specimen: Lavage from Lung, Right Lower Lobe Updated: 12/06/24 1859     Escherichia coli PCR Not Detected     Acinetobacter calcoaceticus-baumannii complex PCR Not Detected     Enterobacter cloacae PCR Not Detected     Klebsiella oxytoca PCR Not Detected     Klebsiella pneumoniae group PCR Not Detected     Klebsiella aerogenes PCR Not Detected     Moraxella catarrhalis PCR Not Detected     Proteus species PCR Not Detected     Pseudomonas aeroginosa PCR Not Detected     Serratia marcescens PCR Not Detected     Staphylococcus aureus PCR Not Detected     Streptococcus pyogenes PCR Not Detected     Haemophilus influenzae PCR Not Detected     Streptococcus agalactiae PCR Not Detected     Streptococcus pneumoniae PCR Not Detected     Chlamydophila pneumoniae PCR Not Detected     Legionella pneumophilia PCR Not Detected     Mycoplasma pneumo by PCR Not Detected     ADENOVIRUS, PCR Not Detected     CTX-M Gene N/A     IMP Gene N/A     KPC Gene N/A     mecA/C and MREJ Gene N/A     NDM Gene N/A     OXA-48-like Gene N/A     VIM Gene N/A     Coronavirus Not Detected     Human Metapneumovirus Not Detected     Human Rhinovirus/Enterovirus Not Detected     Influenza A PCR Not Detected     Influenza B PCR Not Detected     RSV, PCR Not Detected     Parainfluenza virus PCR Not Detected    BAL Culture, Quantitative - Lavage, Bronchus [[id number]] Collected: 12/06/24 1719    Lab Status: Final result Specimen: Lavage from Bronchus Updated: 12/08/24 1013     BAL Culture >100,000 CFU/mL Normal respiratory lashae. No S. aureus or Pseudomonas aeruginosa detected. Final report.     Gram Stain No  organisms seen    Anaerobic Culture - Pleural Fluid, Pleural Cavity [[id number]]  (Normal) Collected: 12/06/24 1235    Lab Status: Final result Specimen: Pleural Fluid from Pleural Cavity Updated: 12/11/24 0657     Anaerobic Culture No anaerobes isolated at 5 days    AFB Culture - Body Fluid, Pleural Cavity [[id number]] Collected: 12/06/24 1233    Lab Status: Preliminary result Specimen: Body Fluid from Pleural Cavity Updated: 12/11/24 1245     AFB Culture No AFB isolated at less than 1 week     AFB Stain Few (2+) WBCs seen      No organisms seen      No acid fast bacilli seen on direct smear    Body Fluid Culture - Body Fluid, Pleural Cavity [[id number]] Collected: 12/06/24 1233    Lab Status: Final result Specimen: Body Fluid from Pleural Cavity Updated: 12/09/24 0952     Body Fluid Culture No growth at 3 days     Gram Stain Few (2+) WBCs seen      No organisms seen    Fungus Culture - Body Fluid, Pleural Cavity [[id number]] Collected: 12/06/24 1233    Lab Status: Preliminary result Specimen: Body Fluid from Pleural Cavity Updated: 12/11/24 1246     Fungus Culture No fungus isolated at less than 1 week    Blood Culture - Blood, Arm, Left [[id number]]  (Abnormal)  (Susceptibility) Collected: 12/05/24 7446    Lab Status: Final result Specimen: Blood from Arm, Left Updated: 12/11/24 0729     Blood Culture Staphylococcus epidermidis     Isolated from Aerobic Bottle     Blood Culture Streptococcus mutans     Isolated from Anaerobic Bottle     Gram Stain Aerobic Bottle Gram positive cocci in clusters      Anaerobic Bottle Gram positive cocci in chains    Narrative:            Susceptibility        Staphylococcus epidermidis      RITU      Oxacillin Resistant      Vancomycin Susceptible                       Susceptibility        Streptococcus mutans      RITU      Ceftriaxone Susceptible      Penicillin G Susceptible      Vancomycin Susceptible                           Blood Culture - Blood, Arm, Left [[id number]]  (Abnormal)  Collected: 12/05/24 1755    Lab Status: Final result Specimen: Blood from Arm, Left Updated: 12/11/24 0729     Blood Culture Staphylococcus epidermidis     Isolated from Aerobic and Anaerobic Bottles     Blood Culture Streptococcus mutans     Isolated from Anaerobic Bottle     Blood Culture Corynebacterium species     Isolated from Anaerobic Bottle     Gram Stain Aerobic Bottle Gram positive cocci in clusters      Anaerobic Bottle Gram positive cocci in chains and clusters    Narrative:      Refer to previous blood culture collected on 12/05/2024 1755 for MICs  Corynebacterium is probable contaminant requires clinical correlation, susceptibility not performed unless requested by physician.       Blood Culture ID, PCR - Blood, Arm, Left [[id number]]  (Abnormal) Collected: 12/05/24 1755    Lab Status: Final result Specimen: Blood from Arm, Left Updated: 12/06/24 1617     BCID, PCR Staph spp, not aureus or lugdunensis. Identification by BCID2 PCR.     BCID, PCR 2 Streptococcus spp, not A, B, or pneumoniae. Identification by BCID2 PCR.     BOTTLE TYPE Aerobic Bottle    Narrative:      Staph Sp mecA/C    Blood Culture ID, PCR - Blood, Arm, Left [[id number]]  (Abnormal) Collected: 12/05/24 1755    Lab Status: Final result Specimen: Blood from Arm, Left Updated: 12/07/24 0943     BCID, PCR Staph spp, not aureus or lugdunensis. Identification by BCID2 PCR.     BCID, PCR 2 Streptococcus spp, not A, B, or pneumoniae. Identification by BCID2 PCR.     BOTTLE TYPE Anaerobic Bottle    Narrative:      S.epi with mecA/C    Blood Culture ID, PCR - Blood, Arm, Left [[id number]]  (Abnormal) Collected: 12/05/24 1755    Lab Status: Final result Specimen: Blood from Arm, Left Updated: 12/07/24 0913     BCID, PCR Streptococcus spp, not A, B, or pneumoniae. Identification by BCID2 PCR.     BCID, PCR 2 Candida glabrata. Identification by BCID2 PCR.     BOTTLE TYPE Anaerobic Bottle    Narrative:      Infectious disease consultation is highly  recommended to rule out distant foci of infection.          [x]  Radiology  XR Abdomen KUB    Result Date: 12/11/2024  Impression: 1.Lines and tubes as above. 2.Indeterminate bowel gas pattern. 3.Subcutaneous emphysema overlies the right lower chest. Bilateral pleural effusions. Electronically Signed: Carlos Joiner MD  12/11/2024 7:27 AM EST  Workstation ID: [id number]    XR Chest 1 View    Result Date: 12/11/2024  1.Interval removal of right chest tube without evidence of pneumothorax. 2.Persistent pleural effusions with bibasilar consolidations, right worse than left. 3.Subcutaneous emphysema. Electronically Signed: Cristian Tiwari MD  12/11/2024 5:15 AM EST  Workstation ID: [id number]    XR Chest 1 View    Result Date: 12/10/2024  1.No evidence of pneumothorax. 2.Improved aeration at the left lung base. 3.Persistent infiltrate at the right lung base. 4.Subcutaneous emphysema. Electronically Signed: Cristian Tiwari MD  12/10/2024 2:10 AM EST  Workstation ID: [id number]    XR Chest 1 View    Result Date: 12/9/2024  Impression: 1. No change in subcutaneous emphysema. 2. Slight improvement in patchy bilateral airspace disease. 3. No change in right thoracostomy tube. No definite pneumothorax identified. Electronically Signed: Stephan Dey MD  12/9/2024 5:18 PM EST  Workstation ID: [id number]    XR Chest 1 View    Result Date: 12/9/2024  Impression: 1.Interval extubation. 2.Right-sided chest tube in unchanged position. No definite pneumothorax. 3.Patchy right greater than left airspace disease, similar to the prior examination. 4.Subcutaneous emphysema throughout the chest, right greater than left. This has mildly improved from the prior exam. Electronically Signed: Reynaldo Rangel  12/9/2024 9:35 AM EST  Workstation ID: [id number]    XR Chest 1 View    Result Date: 12/8/2024  1.No significant interval change. 2.Right-sided chest tube without evidence of pneumothorax. 3.Bilateral infiltrates, right worse than left.  Electronically Signed: Cristian Tiwari MD  12/8/2024 6:11 AM EST  Workstation ID: [id number]    XR Chest 1 View    Result Date: 12/7/2024  1.Right chest tube has been pulled back slightly, but the side ports appear to be within the pleural space. No definite pneumothorax. 2.Extensive subcutaneous gas in the chest wall. 3.Stable bilateral infiltrates. Electronically Signed: Cristian Tiwari MD  12/7/2024 8:31 PM EST  Workstation ID: [id number]    XR Abdomen KUB    Result Date: 12/7/2024  Impression: Weighted tip feeding catheter projects over the stomach. Electronically Signed: Claus Sánchez MD  12/7/2024 1:08 PM EST  Workstation ID: [id number]    XR Chest 1 View    Result Date: 12/7/2024  Impression: 1.Endotracheal tube in good position. 2.Right-sided chest tube in place. No pneumothorax identified. 3.Diffuse bilateral subcutaneous emphysema appears slightly improved. 4.Patchy airspace disease throughout right lung appears unchanged. Electronically Signed: Winston Reyes MD  12/7/2024 9:25 AM EST  Workstation ID: [id number]    XR Chest 1 View    Result Date: 12/6/2024  Impression: 1.Endotracheal tube tip is 3 cm above the mel. 2.Slight change in position of the right-sided chest tube with increased subcutaneous emphysema tracking throughout both sides of the chest, right worse than left. Evaluation for pneumothorax is limited due to the subcutaneous emphysema. 3.Decreased right-sided airspace disease compared to prior exam. Electronically Signed: Reynaldo Rangel  12/6/2024 4:35 PM EST  Workstation ID: [id number]    XR Chest 1 View    Result Date: 12/6/2024  Impression: 1.Interval retraction of the endotracheal tube which now appears in more appropriate position overlying the mid thoracic trachea. 2.Mildly displaced right lateral seventh rib fracture. There are also mildly displaced left posterior second and third rib fractures. These are new from prior same day radiographs. 3.Persistent multifocal airspace opacities  throughout the right lung with right greater than left pleural effusions. 4.Mild interval retraction of the right approach thoracostomy tube. Trace right apical pneumothorax. Increased right lateral chest wall emphysema. Electronically Signed: Devante Lang MD  12/6/2024 2:26 PM EST  Workstation ID: [id number]    XR Chest 1 View    Result Date: 12/6/2024  Impression: 1. Proximal right mainstem intubation. Retraction by 3.5 cm may place the ET tube in a more standard position. 2. Right thoracotomy tube without visible pneumothorax. Small volume right chest wall subcutaneous emphysema. 3. Multifocal airspace disease and/or atelectasis worsened in the right middle and upper lobes. Based on acuity this could reflect aspiration, hemorrhage or edema. 4. Right greater than left pleural effusions similar to may be slightly improved allowing for technical differences to prior chest radiograph. Findings communicated with ordering provider Vamshi Villafana via [employer] secure chat, message viewed at 12:13 p.m. 12/6/2024. Electronically Signed: Lorne Bustamante MD  12/6/2024 12:23 PM EST  Workstation ID: [id number]    XR Chest 1 View    Result Date: 12/6/2024  Impression: 1.Decreased right-sided pleural effusion status post thoracentesis. No pneumothorax. 2.Similar bilateral airspace opacities and small left pleural effusion. Electronically Signed: Devante Lang MD  12/6/2024 11:30 AM EST  Workstation ID: [id number]    CT Chest Without Contrast Diagnostic    Result Date: 12/6/2024  Impression: 1.Moderate right and small left pleural effusions with bibasilar consolidations, which could represent atelectasis, aspiration, or pneumonia. 2.Interlobular septal thickening bilaterally, suggesting mild pulmonary edema. 3.Cardiomegaly. 4.Erosive changes at T5-6 appear similar, suggesting ongoing discitis/osteomyelitis. Electronically Signed: Winston Reyes MD  12/6/2024 8:44 AM EST  Workstation ID: [id number]    XR Chest 1 View    Result Date:  12/5/2024  Impression: Similar moderate  CHF features. Electronically Signed: Jose Hammond MD  12/5/2024 4:35 PM EST  Workstation ID: [id number]   []  EKG/Telemetry   []  Cardiology/Vascular   []  Pathology  []  Old records  []  Other:    Assessment & Plan   Assessment / Plan     Status postcardiac arrest, secondary to hypoxia  Acute hypoxic/hypercapnic respiratory failure requiring mechanical ventilation  Hemoptysis  Altered mental status, concern for seizures  End-stage renal disease on dialysis  Acute on chronic diastolic heart failure  Staph epidermidis cystitis on vancomycin  Type 2 diabetes  Atrial fibrillation on Eliquis  Candidemia    Plan:  Patient remains admitted to the ICU for further care management  Pulmonary critical care, nephrology consulted, appreciate assistance  For concerns of seizure-like activity, started empirically on Keppra.  EEG returned with no seizure-like activity  Patient remains on pressors at this time, continue to wean as able  Chest tube has since been removed  Patient remains on CRRT per nephrology service, plans for conversion to intermittent hemodialysis per nephrology  Respiratory status has improved with fluid removal on CRRT.  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  Discussed with intensivist who discussed with ID stewardship.  Would like CT abdomen and pelvis as well as MRI of spine.  However given his respiratory status, do not feel he would tolerate being in scanner.  Continue to monitor daily  Patient's blood cultures from 12/7/2024 remain no growth today to 3 days  Continue NIPPV as tolerated  Placing core track today, continue tube feeds per dietitian    Discussed with RN.  Discussed with intensivist, nephrologist, family at bedside    VTE Prophylaxis:  Pharmacologic & mechanical VTE prophylaxis  orders are present.        CODE STATUS:   Level Of Support Discussed With: Next of Kin (If No Surrogate)  Code Status (Patient has no pulse and is not breathing): No CPR (Do Not Attempt to Resuscitate)  Medical Interventions (Patient has pulse or is breathing): Full Support

## 2024-12-11 NOTE — PROGRESS NOTES
RT EQUIPMENT DEVICE RELATED - SKIN ASSESSMENT    RT Medical Equipment/Device:     NIV Mask:  Under-the-nose   size:  A    Skin Assessment:      Cheek:  Intact  Nose:  Intact    Device Skin Pressure Protection:  Pressure points protected    Nurse Notification:  Kate Low, RRT

## 2024-12-11 NOTE — PLAN OF CARE
Goal Outcome Evaluation:            Patient tolerated wearing the Bipap 16/8 tonight. Will transition to nasal cannula during the day.

## 2024-12-11 NOTE — PROGRESS NOTES
[facility]: Vancomycin Monitoring Note    Nelson Wang is a 79 y.o. male who is on day 40/42 of pharmacy to dose vancomycin for Bacteremia, Bone and/or Joint Infection, and Empiric.    Previous Vancomycin Dose: 750mg IV q12h   Imaging Reviewed?: Yes  Updated Cultures and Sensitivities:   Microbiology Results (last 10 days)       Procedure Component Value - Date/Time    Blood Culture - Blood, Blood, Central Line [[id number]]  (Normal) Collected: 12/07/24 1023    Lab Status: Preliminary result Specimen: Blood, Central Line Updated: 12/11/24 1031     Blood Culture No growth at 4 days    Blood Culture - Blood, Blood, Arterial Line [[id number]]  (Normal) Collected: 12/07/24 1023    Lab Status: Preliminary result Specimen: Blood, Arterial Line Updated: 12/11/24 1046     Blood Culture No growth at 4 days    Pneumonia Panel - Lavage, Lung, Right Lower Lobe [[id number]]  (Normal) Collected: 12/06/24 1719    Lab Status: Final result Specimen: Lavage from Lung, Right Lower Lobe Updated: 12/06/24 1859     Escherichia coli PCR Not Detected     Acinetobacter calcoaceticus-baumannii complex PCR Not Detected     Enterobacter cloacae PCR Not Detected     Klebsiella oxytoca PCR Not Detected     Klebsiella pneumoniae group PCR Not Detected     Klebsiella aerogenes PCR Not Detected     Moraxella catarrhalis PCR Not Detected     Proteus species PCR Not Detected     Pseudomonas aeroginosa PCR Not Detected     Serratia marcescens PCR Not Detected     Staphylococcus aureus PCR Not Detected     Streptococcus pyogenes PCR Not Detected     Haemophilus influenzae PCR Not Detected     Streptococcus agalactiae PCR Not Detected     Streptococcus pneumoniae PCR Not Detected     Chlamydophila pneumoniae PCR Not Detected     Legionella pneumophilia PCR Not Detected     Mycoplasma pneumo by PCR Not Detected     ADENOVIRUS, PCR Not Detected     CTX-M Gene N/A     IMP Gene N/A     KPC Gene N/A     mecA/C and MREJ Gene N/A      NDM Gene N/A     OXA-48-like Gene N/A     VIM Gene N/A     Coronavirus Not Detected     Human Metapneumovirus Not Detected     Human Rhinovirus/Enterovirus Not Detected     Influenza A PCR Not Detected     Influenza B PCR Not Detected     RSV, PCR Not Detected     Parainfluenza virus PCR Not Detected    BAL Culture, Quantitative - Lavage, Bronchus [[id number]] Collected: 12/06/24 1719    Lab Status: Final result Specimen: Lavage from Bronchus Updated: 12/08/24 1013     BAL Culture >100,000 CFU/mL Normal respiratory lashae. No S. aureus or Pseudomonas aeruginosa detected. Final report.     Gram Stain No organisms seen    Anaerobic Culture - Pleural Fluid, Pleural Cavity [[id number]]  (Normal) Collected: 12/06/24 1235    Lab Status: Final result Specimen: Pleural Fluid from Pleural Cavity Updated: 12/11/24 0657     Anaerobic Culture No anaerobes isolated at 5 days    AFB Culture - Body Fluid, Pleural Cavity [[id number]] Collected: 12/06/24 1233    Lab Status: Preliminary result Specimen: Body Fluid from Pleural Cavity Updated: 12/11/24 1245     AFB Culture No AFB isolated at less than 1 week     AFB Stain Few (2+) WBCs seen      No organisms seen      No acid fast bacilli seen on direct smear    Body Fluid Culture - Body Fluid, Pleural Cavity [[id number]] Collected: 12/06/24 1233    Lab Status: Final result Specimen: Body Fluid from Pleural Cavity Updated: 12/09/24 0952     Body Fluid Culture No growth at 3 days     Gram Stain Few (2+) WBCs seen      No organisms seen    Fungus Culture - Body Fluid, Pleural Cavity [[id number]] Collected: 12/06/24 1233    Lab Status: Preliminary result Specimen: Body Fluid from Pleural Cavity Updated: 12/11/24 1246     Fungus Culture No fungus isolated at less than 1 week    Blood Culture - Blood, Arm, Left [[id number]]  (Abnormal)  (Susceptibility) Collected: 12/05/24 1755    Lab Status: Final result Specimen: Blood from Arm, Left Updated: 12/11/24 0729     Blood Culture Staphylococcus  epidermidis     Isolated from Aerobic Bottle     Blood Culture Streptococcus mutans     Isolated from Anaerobic Bottle     Gram Stain Aerobic Bottle Gram positive cocci in clusters      Anaerobic Bottle Gram positive cocci in chains    Narrative:            Susceptibility        Staphylococcus epidermidis      RITU      Oxacillin >=4 ug/ml Resistant      Vancomycin 1 ug/ml Susceptible                       Susceptibility        Streptococcus mutans      RITU      Ceftriaxone <=0.12 ug/ml Susceptible      Penicillin G <=0.06 ug/ml Susceptible      Vancomycin 1 ug/ml Susceptible                           Blood Culture - Blood, Arm, Left [[id number]]  (Abnormal) Collected: 12/05/24 1755    Lab Status: Final result Specimen: Blood from Arm, Left Updated: 12/11/24 0729     Blood Culture Staphylococcus epidermidis     Isolated from Aerobic and Anaerobic Bottles     Blood Culture Streptococcus mutans     Isolated from Anaerobic Bottle     Blood Culture Corynebacterium species     Isolated from Anaerobic Bottle     Gram Stain Aerobic Bottle Gram positive cocci in clusters      Anaerobic Bottle Gram positive cocci in chains and clusters    Narrative:      Refer to previous blood culture collected on 12/05/2024 1755 for MICs  Corynebacterium is probable contaminant requires clinical correlation, susceptibility not performed unless requested by physician.       Blood Culture ID, PCR - Blood, Arm, Left [[id number]]  (Abnormal) Collected: 12/05/24 1755    Lab Status: Final result Specimen: Blood from Arm, Left Updated: 12/06/24 1617     BCID, PCR Staph spp, not aureus or lugdunensis. Identification by BCID2 PCR.     BCID, PCR 2 Streptococcus spp, not A, B, or pneumoniae. Identification by BCID2 PCR.     BOTTLE TYPE Aerobic Bottle    Narrative:      Staph Sp mecA/C    Blood Culture ID, PCR - Blood, Arm, Left [[id number]]  (Abnormal) Collected: 12/05/24 1755    Lab Status: Final result Specimen: Blood from Arm, Left Updated: 12/07/24  "0943     BCID, PCR Staph spp, not aureus or lugdunensis. Identification by BCID2 PCR.     BCID, PCR 2 Streptococcus spp, not A, B, or pneumoniae. Identification by BCID2 PCR.     BOTTLE TYPE Anaerobic Bottle    Narrative:      S.epi with mecA/C    Blood Culture ID, PCR - Blood, Arm, Left [[id number]]  (Abnormal) Collected: 24 1755    Lab Status: Final result Specimen: Blood from Arm, Left Updated: 24     BCID, PCR Streptococcus spp, not A, B, or pneumoniae. Identification by BCID2 PCR.     BCID, PCR 2 Candida glabrata. Identification by BCID2 PCR.     BOTTLE TYPE Anaerobic Bottle    Narrative:      Infectious disease consultation is highly recommended to rule out distant foci of infection.              Vitals/Labs  Ht: 170.2 cm (67\"); Wt: 70.2 kg (154 lb 12.2 oz)   Temp (24hrs), Av.9 °F (36.6 °C), Min:92.8 °F (33.8 °C), Max:99.9 °F (37.7 °C)   Estimated Creatinine Clearance: 39 mL/min (A) (by C-G formula based on SCr of 1.55 mg/dL (H)).   Hemodialysis, schedule to be determined, per pulm and nursing plan is to restart 2024    Results from last 7 days   Lab Units 24  0611 24  0431 24  0008 12/10/24  1221 12/10/24  0620 24  1830 24  1522 24  1220 24  0622 24  0410 24  2346 24  1154 24  0606 24  1238 24  0748   VANCOMYCIN RM mcg/mL 27.18  --   --   --   --   --   --   --  18.24  --  23.90  --  14.16  --  27.00   VANCOMYCIN TR mcg/mL  --   --   --   --   --   --  17.48  --   --   --   --   --   --   --   --    CREATININE mg/dL 1.55*  1.55* 1.49* 1.61*   < > 1.75*   < >  --    < > 2.16*   < > 1.39*   < > 1.81*  1.81*   < > 4.53*   WBC 10*3/mm3 9.57  --   --   --  5.77  --   --   --  5.23  --  6.95  --  6.28   < > 3.73    < > = values in this interval not displayed.     Assessment/Plan    Current Vancomycin Dose: Pulse dose s/t dialysis  CRRT stopped this morning and plan is to transition to HD tomorrow. "   Discontinued scheduled vancomycin. Vancomycin random this morning was 27.18. Will hold vancomycin today.  Recheck random level in AM.   We will continue to monitor patient changes and renal function     Thank you for involving pharmacy in this patient's care. Please contact pharmacy with any questions or concerns.    Xochilt Neff [facility]  Clinical Pharmacist

## 2024-12-11 NOTE — PLAN OF CARE
Goal Outcome Evaluation:  Plan of Care Reviewed With: patient        Progress: no change  Outcome Evaluation: Patient remains on 6lpm nasal cannula this morning, restless.  Patient did wear the BIPAP 16/8 R 10 last night. It remains in room on standby.

## 2024-12-11 NOTE — SIGNIFICANT NOTE
12/11/24 0747   Physical Therapy Time and Intention   Session Not Performed other (see comments)  (Restraints)

## 2024-12-11 NOTE — PROGRESS NOTES
Nutrition Services    Patient Name: Nelson Wang  YOB: 1945  MRN: [id number]  Admission date: 12/5/2024    PROGRESS NOTE      Encounter Information: EN Follow Up       PO Diet: NPO Diet NPO Type: Strict NPO   PO Supplements: NPO   PO Intake:  NPO       Current nutrition support: Novasource Renal @ 40 mL/hr, flush 65 mL q4h  Provides: 1760 kcal, 80 g pro, 1015 mL (625 mL FW + 390 mL flush)    Prosource No Carb BID provides additional 120 kcal, 30 g pro/day    Total provided: 1880 kcal, 110 g pro, 1015 mL       Estimated Needs: 5732-1798 kcal, 106-141 g (1.5-2 g/70.7 kg CBW, HDU), Fluids per critical care/Renal MD       Nutrition support review: TF paused. Patient pulled Cortrak NGT. Waiting for new placement.        Labs (reviewed below): Reviewed.       GI Function:  Per nurse, 1 large BM this AM.        Nutrition Intervention Updates: Remains on Levo. CRRT stopped, plans for HD tomorrow. Chest tube removed yesterday. 6L NC. Resume TF once enteral access is reestablished.      Results from last 7 days   Lab Units 12/11/24  0611 12/11/24  0431 12/11/24  0008 12/10/24  1814 12/07/24  1154 12/07/24  0606 12/06/24  1238 12/06/24  0748   SODIUM mmol/L 135*  135*  --  137 138   < > 136  136   < > 134*   POTASSIUM mmol/L 3.9  3.9  --  3.8 3.9   < > 4.4  4.4   < > 4.1   CHLORIDE mmol/L 102  102  --  105 105   < > 103  103   < > 95*   CO2 mmol/L 19.3*  19.3*  --  21.5* 22.7   < > 22.1  22.1   < > 28.3   BUN mg/dL 18  18  --  17 16   < > 9  9   < > 15   CREATININE mg/dL 1.55*  1.55* 1.49* 1.61* 1.73*   < > 1.81*  1.81*   < > 4.53*   CALCIUM mg/dL 9.7  9.7  --  9.2 8.9   < > 8.3*  8.3*   < > 9.1   BILIRUBIN mg/dL 1.6*  --   --   --   --  1.2  --  0.5   ALK PHOS U/L 94  --   --   --   --  63  --  69   ALT (SGPT) U/L 36  --   --   --   --  47*  --  9   AST (SGOT) U/L 61*  --   --   --   --  138*  --  28   GLUCOSE mg/dL 236*  236*  --  220* 181*   < > 75  75   < > 86    < > = values in this  interval not displayed.     Results from last 7 days   Lab Units 12/11/24  0611 12/10/24  1221 12/10/24  0620 12/09/24  1220 12/09/24  0622   MAGNESIUM mg/dL 2.5*  --  2.4  --  2.6*   PHOSPHORUS mg/dL 2.8   < > 2.7   < > 4.0   HEMOGLOBIN g/dL 11.4*  --  9.1*  --  8.8*   HEMATOCRIT % 38.1  --  30.5*  --  29.5*    < > = values in this interval not displayed.     COVID19   Date Value Ref Range Status   10/08/2024 Not Detected Not Detected - Ref. Range Final     Lab Results   Component Value Date    HGBA1C 5.90 (H) 11/09/2024       RD to follow up per protocol.    Electronically signed by:  Emma Collazo RD  12/11/24 09:01 EST

## 2024-12-11 NOTE — PLAN OF CARE
CRRT stopped and TDC and Yoli removed today. Patient off all pressors.  Plan is for HD dialysis tomorrow. Cortrak replaced.  Family at bedside.  HIREN DAVIS

## 2024-12-11 NOTE — PROGRESS NOTES
[facility]     Nephrology Progress Note      Patient Name: Nelson Wang  : 1945  MRN: [id number]  Primary Care Physician:  Domingo Bravo MD  Date of admission: 2024    Subjective   Subjective     Interval History:  Events noted.  On CRRT, drop pressure earlier this morning, ultrafiltration turned off.  Blood flow rate down to 150.  Mentation still altered, son-in-law at bedside.  Discussed with ICU team.  On Levophed.     Review of Systems   Review of systems could not be obtained due to   patient intubated.    Objective   Objective     Vitals:   Temp:  [92.8 °F (33.8 °C)-99.9 °F (37.7 °C)] 97.5 °F (36.4 °C)  Heart Rate:  [] 156  Resp:  [14-30] 22  BP: ()/(31-94) 78/56  Arterial Line BP: ()/(25-58) 113/56  Flow (L/min) (Oxygen Therapy):  [6-60] 6  Physical Exam:   Constitutional: Somewhat restless, on O2 per nasal cannula high flow.   Eyes: sclerae anicteric, no conjunctival injection   HENT: mucous membranes moist   Neck: Supple, no thyromegaly, no lymphadenopathy, trachea midline, No JVD   Respiratory: Decreased  to auscultation bilaterally, nonlabored respirations,  Subcutaneous emphysema.   Cardiovascular: Irregular, tachycardic, no murmurs, rubs, or gallops.   Gastrointestinal: Positive bowel sounds, soft, nondistended   Musculoskeletal: No edema, no clubbing or cyanosis, left upper extremity AV fistula is patent with good thrill and bruit.   Psychiatric: Restless   Neurologic: Staring at the ceiling, not following commands, moving extremities.   Skin: warm and dry, no rashes   Right femoral A-line and Shiley.     Result Review    Result Reviewed:  I have personally reviewed the results from the time of this admission to 2024 08:55 EST and agree with these findings:  [x]  Laboratory  []  Microbiology  [x]  Radiology  []  EKG/Telemetry   []  Cardiology/Vascular   []  Pathology  [x]  Old records  []  Other:        Lab 24  0611 24  0431 24  0008  12/10/24  1814 12/10/24  1221 12/10/24  0620 12/09/24  2318 12/09/24  1830 12/09/24  1220 12/09/24  0622 12/09/24  0009 12/08/24  0410 12/07/24  2346 12/07/24  1822 12/07/24  1154 12/07/24  0606 12/07/24  0007 12/06/24  1409 12/06/24  1248 12/06/24  1238 12/06/24  0748 12/05/24  1635   SODIUM 135*  135*  --  137 138 138 136 139 139 138 136 140 136 136 137 136 136  136 140  --   --  144 134* 137   POTASSIUM 3.9  3.9  --  3.8 3.9 3.9 4.1 3.9 4.2 3.7 4.4 4.4 4.4 4.6 4.6 4.5 4.4  4.4 4.2  --   --  3.6 4.1 3.9   CHLORIDE 102  102  --  105 105 108* 103 107 107 112* 102 106 102 104 103 102 103  103 104  --   --  101 95* 97*   CO2 19.3*  19.3*  --  21.5* 22.7 21.5* 20.3* 22.3 21.1* 18.5* 20.9* 20.7* 19.5* 19.7* 21.1* 21.7* 22.1  22.1 24.2  --   --  36.5* 28.3 30.6*   BUN 18  18  --  17 16 17 17 19 21 18 20 22 13 10 9 9 9  9 10  --   --  16 15 12   CREATININE 1.55*  1.55* 1.49* 1.61* 1.73* 1.68* 1.75* 1.82* 2.09* 1.71* 2.16* 2.31* 1.45* 1.39* 1.37* 1.45* 1.81*  1.81* 2.13* 4.59* 4.46* 4.90* 4.53* 3.59*   GLUCOSE 236*  236*  --  220* 181* 148* 188* 160* 148* 121* 136* 146* 142* 138* 83 98 75  75 80  --   --  125* 86 107*   EGFR 45.5*  45.5*  --  43.5* 39.9* 41.3* 39.4* 37.6* 31.8* 40.5* 30.6* 28.2* 49.3* 51.9* 52.8* 49.3* 37.8*  37.8* 31.1*  --   --  11.4* 12.6* 16.6*   ANION GAP 13.7  13.7  --  10.5 10.3 8.5 12.7 9.7 10.9 7.5 13.1 13.3 14.5 12.3 12.9 12.3 10.9  10.9 11.8  --   --  6.5 10.7 9.4   MAGNESIUM 2.5*  --   --   --   --  2.4  --   --   --  2.6*  --  2.3 2.3  --   --  2.1  --   --   --  1.9 1.8  --    PHOSPHORUS 2.8  --  2.4* 2.6 2.6 2.7 3.0 3.4 3.0 4.0 4.0 3.5 4.4 4.6* 3.8 3.5 2.7  --   --  5.7*  --   --              Assessment & Plan   Assessment / Plan       Active Hospital Problems:  Active Hospital Problems    Diagnosis     **Hypoxia        Assessment and Plan:    - ESRD, was on home dialysis. Now on CRRT.  Still on Levophed, low blood pressure last night, no ultrafiltration.  Electrolytes are  stable.  Will DC CRRT and monitor.  Next dialysis possibly tomorrow or Friday through left upper extremity AV fistula or right femoral Shiley.    - Volume overload, much better now.       - Hypoxia on admission with large pleural effusion, status post thoracentesis complicated by pulmonary hemorrhage, status post right-sided chest tube and bronchoscopy.  Intubation then extubation, per pulmonary.    - Aspiration pneumonia, per pulmonary.     - Type 2 diabetes with complications.    - History of hypertension, blood pressure is acceptable now on small dose pressors .    - Anemia of chronic kidney disease, hemoglobin below goal.  On RONALD as outpatient.  Getting Epogen while here.    - Altered mentation.     Discussed with ICU team and son-in-law.    Will follow.

## 2024-12-12 LAB
ALBUMIN SERPL-MCNC: 3.9 G/DL (ref 3.5–5.2)
ALBUMIN/GLOB SERPL: 1 G/DL
ALP SERPL-CCNC: 76 U/L (ref 39–117)
ALT SERPL W P-5'-P-CCNC: 23 U/L (ref 1–41)
ANION GAP SERPL CALCULATED.3IONS-SCNC: 14.2 MMOL/L (ref 5–15)
AST SERPL-CCNC: 39 U/L (ref 1–40)
BACTERIA SPEC AEROBE CULT: NORMAL
BACTERIA SPEC AEROBE CULT: NORMAL
BASOPHILS # BLD AUTO: 0.08 10*3/MM3 (ref 0–0.2)
BASOPHILS NFR BLD AUTO: 1 % (ref 0–1.5)
BILIRUB SERPL-MCNC: 1.4 MG/DL (ref 0–1.2)
BUN SERPL-MCNC: 38 MG/DL (ref 8–23)
BUN/CREAT SERPL: 11.4 (ref 7–25)
CALCIUM SPEC-SCNC: 10.2 MG/DL (ref 8.6–10.5)
CHLORIDE SERPL-SCNC: 104 MMOL/L (ref 98–107)
CO2 SERPL-SCNC: 20.8 MMOL/L (ref 22–29)
CREAT SERPL-MCNC: 3.32 MG/DL (ref 0.76–1.27)
DEPRECATED RDW RBC AUTO: 60.1 FL (ref 37–54)
EGFRCR SERPLBLD CKD-EPI 2021: 18.3 ML/MIN/1.73
EOSINOPHIL # BLD AUTO: 0.23 10*3/MM3 (ref 0–0.4)
EOSINOPHIL NFR BLD AUTO: 2.9 % (ref 0.3–6.2)
ERYTHROCYTE [DISTWIDTH] IN BLOOD BY AUTOMATED COUNT: 19.9 % (ref 12.3–15.4)
GLOBULIN UR ELPH-MCNC: 4 GM/DL
GLUCOSE BLDC GLUCOMTR-MCNC: 209 MG/DL (ref 70–99)
GLUCOSE BLDC GLUCOMTR-MCNC: 215 MG/DL (ref 70–99)
GLUCOSE BLDC GLUCOMTR-MCNC: 220 MG/DL (ref 70–99)
GLUCOSE BLDC GLUCOMTR-MCNC: 235 MG/DL (ref 70–99)
GLUCOSE SERPL-MCNC: 172 MG/DL (ref 65–99)
HCT VFR BLD AUTO: 35.8 % (ref 37.5–51)
HGB BLD-MCNC: 10.6 G/DL (ref 13–17.7)
IMM GRANULOCYTES # BLD AUTO: 0.12 10*3/MM3 (ref 0–0.05)
IMM GRANULOCYTES NFR BLD AUTO: 1.5 % (ref 0–0.5)
LYMPHOCYTES # BLD AUTO: 1.48 10*3/MM3 (ref 0.7–3.1)
LYMPHOCYTES NFR BLD AUTO: 18.6 % (ref 19.6–45.3)
MAGNESIUM SERPL-MCNC: 2.8 MG/DL (ref 1.6–2.4)
MCH RBC QN AUTO: 26.4 PG (ref 26.6–33)
MCHC RBC AUTO-ENTMCNC: 29.6 G/DL (ref 31.5–35.7)
MCV RBC AUTO: 89.3 FL (ref 79–97)
MONOCYTES # BLD AUTO: 1.5 10*3/MM3 (ref 0.1–0.9)
MONOCYTES NFR BLD AUTO: 18.8 % (ref 5–12)
NEUTROPHILS NFR BLD AUTO: 4.56 10*3/MM3 (ref 1.7–7)
NEUTROPHILS NFR BLD AUTO: 57.2 % (ref 42.7–76)
NRBC BLD AUTO-RTO: 2.1 /100 WBC (ref 0–0.2)
PHOSPHATE SERPL-MCNC: 2.4 MG/DL (ref 2.5–4.5)
PLATELET # BLD AUTO: 146 10*3/MM3 (ref 140–450)
PMV BLD AUTO: 10.4 FL (ref 6–12)
POTASSIUM SERPL-SCNC: 4.1 MMOL/L (ref 3.5–5.2)
PROT SERPL-MCNC: 7.9 G/DL (ref 6–8.5)
RBC # BLD AUTO: 4.01 10*6/MM3 (ref 4.14–5.8)
SODIUM SERPL-SCNC: 139 MMOL/L (ref 136–145)
VANCOMYCIN SERPL-MCNC: 30.3 MCG/ML (ref 5–40)
WBC NRBC COR # BLD AUTO: 7.97 10*3/MM3 (ref 3.4–10.8)

## 2024-12-12 PROCEDURE — 63710000001 INSULIN LISPRO (HUMAN) PER 5 UNITS: Performed by: NURSE PRACTITIONER

## 2024-12-12 PROCEDURE — 99291 CRITICAL CARE FIRST HOUR: CPT | Performed by: INTERNAL MEDICINE

## 2024-12-12 PROCEDURE — 80053 COMPREHEN METABOLIC PANEL: CPT | Performed by: INTERNAL MEDICINE

## 2024-12-12 PROCEDURE — 25010000002 MICAFUNGIN SODIUM 100 MG RECONSTITUTED SOLUTION 1 EACH VIAL: Performed by: INTERNAL MEDICINE

## 2024-12-12 PROCEDURE — 80202 ASSAY OF VANCOMYCIN: CPT | Performed by: INTERNAL MEDICINE

## 2024-12-12 PROCEDURE — 25010000002 LEVETRIRACETAM PER 10 MG: Performed by: INTERNAL MEDICINE

## 2024-12-12 PROCEDURE — 94799 UNLISTED PULMONARY SVC/PX: CPT

## 2024-12-12 PROCEDURE — 83735 ASSAY OF MAGNESIUM: CPT | Performed by: INTERNAL MEDICINE

## 2024-12-12 PROCEDURE — 85025 COMPLETE CBC W/AUTO DIFF WBC: CPT | Performed by: STUDENT IN AN ORGANIZED HEALTH CARE EDUCATION/TRAINING PROGRAM

## 2024-12-12 PROCEDURE — 94761 N-INVAS EAR/PLS OXIMETRY MLT: CPT

## 2024-12-12 PROCEDURE — 82948 REAGENT STRIP/BLOOD GLUCOSE: CPT

## 2024-12-12 PROCEDURE — 99232 SBSQ HOSP IP/OBS MODERATE 35: CPT | Performed by: INTERNAL MEDICINE

## 2024-12-12 PROCEDURE — 84100 ASSAY OF PHOSPHORUS: CPT | Performed by: INTERNAL MEDICINE

## 2024-12-12 RX ADMIN — BUDESONIDE 0.5 MG: 0.5 INHALANT ORAL at 20:00

## 2024-12-12 RX ADMIN — Medication 10 ML: at 22:03

## 2024-12-12 RX ADMIN — INSULIN LISPRO 3 UNITS: 100 INJECTION, SOLUTION INTRAVENOUS; SUBCUTANEOUS at 00:14

## 2024-12-12 RX ADMIN — INSULIN LISPRO 3 UNITS: 100 INJECTION, SOLUTION INTRAVENOUS; SUBCUTANEOUS at 12:02

## 2024-12-12 RX ADMIN — ARFORMOTEROL TARTRATE 15 MCG: 15 SOLUTION RESPIRATORY (INHALATION) at 06:28

## 2024-12-12 RX ADMIN — MICAFUNGIN SODIUM 100 MG: 100 INJECTION, POWDER, LYOPHILIZED, FOR SOLUTION INTRAVENOUS at 08:30

## 2024-12-12 RX ADMIN — INSULIN LISPRO 3 UNITS: 100 INJECTION, SOLUTION INTRAVENOUS; SUBCUTANEOUS at 05:48

## 2024-12-12 RX ADMIN — Medication 10 ML: at 08:27

## 2024-12-12 RX ADMIN — ARFORMOTEROL TARTRATE 15 MCG: 15 SOLUTION RESPIRATORY (INHALATION) at 20:00

## 2024-12-12 RX ADMIN — LEVETIRACETAM 500 MG: 100 INJECTION, SOLUTION INTRAVENOUS at 08:25

## 2024-12-12 RX ADMIN — BUDESONIDE 0.5 MG: 0.5 INHALANT ORAL at 06:29

## 2024-12-12 RX ADMIN — LEVOTHYROXINE SODIUM 175 MCG: 0.03 TABLET ORAL at 05:47

## 2024-12-12 RX ADMIN — INSULIN LISPRO 3 UNITS: 100 INJECTION, SOLUTION INTRAVENOUS; SUBCUTANEOUS at 17:19

## 2024-12-12 NOTE — PROGRESS NOTES
RT EQUIPMENT DEVICE RELATED - SKIN ASSESSMENT    RT Medical Equipment/Device:     NIV Mask:  Under-the-nose   size:  A    Skin Assessment:      Cheek:  Intact  Nose:  Intact  Lips:  Intact  Mouth:  Intact    Device Skin Pressure Protection:  Positioning supports utilized, Pressure points protected, and Skin-to-device areas padded:  Mepilex    Nurse Notification:  Kate Mota, RRT

## 2024-12-12 NOTE — PROGRESS NOTES
RT EQUIPMENT DEVICE RELATED - SKIN ASSESSMENT    RT Medical Equipment/Device:     NIV Mask:  Under-the-nose   size:       Skin Assessment:      Cheek:  Intact  Nose:  Intact  Mouth:  Intact    Device Skin Pressure Protection:  Skin-to-device areas padded:  None Required    Nurse Notification:  Kate Soto, RRT

## 2024-12-12 NOTE — PLAN OF CARE
Patient finally rested well throughout the day. Weaned off oxygen, currently on room air. No drips at this time. Still confused, but somewhat more responsive, more so to family when speaking native language. Family at bedside.   HIREN DAVIS

## 2024-12-12 NOTE — SIGNIFICANT NOTE
12/12/24 1100   Physical Therapy Time and Intention   Session Not Performed other (see comments)  (Hold per nursing)

## 2024-12-12 NOTE — PLAN OF CARE
Goal Outcome Evaluation:   Patient did not utilize BiPAP support with sleep during shift due to increased agitation and movement in bed. Patient remains on 6lpm HFNC.     Problem: Noninvasive Ventilation Acute  Goal: Effective Unassisted Ventilation and Oxygenation  Outcome: Progressing  Intervention: Monitor and Manage Noninvasive Ventilation  Flowsheets  Taken 12/12/2024 0000 by Angela Salgado RN  Airway/Ventilation Management:   airway patency maintained   calming measures promoted  Taken 12/11/2024 0245 by Maribell Peraza RRT  NPPV/CPAP Maintenance: proper fit/secure     Problem: Noninvasive Ventilation Acute  Goal: Effective Unassisted Ventilation and Oxygenation  Intervention: Monitor and Manage Noninvasive Ventilation  Flowsheets  Taken 12/12/2024 0000 by Angela Salgado RN  Airway/Ventilation Management:   airway patency maintained   calming measures promoted  Taken 12/11/2024 0245 by Maribell Peraza RRT  NPPV/CPAP Maintenance: proper fit/secure

## 2024-12-12 NOTE — PROGRESS NOTES
[facility]     Progress Note    Patient Name: Nelson Wang  : 1945  MRN: [id number]  Primary Care Physician:  Domingo Bravo MD  Date of admission: 2024    Subjective   Subjective     Chief Complaint:   Patient is critically ill status post cardiac arrest with hemoptysis, hypoxic respiratory failure, likely hypoxic arrest, with acute hypoxic respiratory failure, acute on chronic congestive diastolic heart failure with EF of 50 to 60%, grade 1 diastolic dysfunction, acute cardiogenic pulmonary edema loculated right-sided pleural effusion, ESRD on hemodialysis, GASTON with home CPAP.    On 6 L of oxygen  Mental status waxing and waning  He is more awake for me this morning but not following commands  EEG with no seizures and overall maybe a small amount of encephalopathy  Arterial line and dialysis catheter removed yesterday  Getting dialysis today via fistula as planned  Currently not on any pressors  Tube feeds are being tolerated  Remains on vancomycin and micafungin  Repeat blood cultures negative      Objective   Objective     Vitals:   Temp:  [97.3 °F (36.3 °C)-98.9 °F (37.2 °C)] 97.7 °F (36.5 °C)  Heart Rate:  [] 91  Resp:  [16-24] 18  BP: ()/(39-77) 127/53  Arterial Line BP: (106-153)/(40-54) 129/49  Flow (L/min) (Oxygen Therapy):  [4-6] 4    Physical Exam   Vital Signs Reviewed  Critically ill, more awake but still confused, somewhat better than previous days  HEENT:  PERRL.  OP clear  Chest:  poor aeration, diminished breath sounds, resonant to percussion b/l, no increased work of breathing noted, decreasing subcutaneous emphysema right chest  CV: RRR, no MGR, pulses 2+, equal  Abd:  Soft, NT, ND, + BS, no HSM  EXT:  no clubbing, no cyanosis, No BLE edema, left arm AV fistula in place  Neuro: Grimaces to pain moves all extremities, cranial nerves intact, more awake but very confused   skin: No rashes or lesions noted      Result Review    Result Review:  I have personally  reviewed the results from the time of this admission to 12/12/2024 11:26 EST and agree with these findings:  [x]  Laboratory  [x]  Microbiology  [x]  Radiology  [x]  EKG/Telemetry   [x]  Cardiology/Vascular   []  Pathology  []  Old records  []  Other:  Most notable findings include:       Lab 12/12/24  0417 12/11/24  0611 12/11/24  0431 12/11/24  0008 12/10/24  1814 12/10/24  1221 12/10/24  0620 12/09/24  2318 12/09/24  1220 12/09/24  0622 12/08/24  0410 12/07/24  2346 12/07/24  1154 12/07/24  0606 12/07/24  0245 12/06/24  1248 12/06/24  1238 12/06/24  0748 12/05/24  1635   WBC 7.97 9.57  --   --   --   --  5.77  --   --  5.23  --  6.95  --  6.28  --   --  5.95 3.73 4.13   HEMOGLOBIN 10.6* 11.4*  --   --   --   --  9.1*  --   --  8.8*  --  9.3*  --  9.3* 9.3*  --  6.9* 8.1* 8.4*   HEMATOCRIT 35.8* 38.1  --   --   --   --  30.5*  --   --  29.5*  --  30.7*  --  30.8* 30.5*  --  24.6* 28.2* 29.0*   PLATELETS 146 148  --   --   --   --  124*  --   --  115*  --  143  --  109*  --   --  136* 141 132*   SODIUM 139 135*  135*  --  137 138 138 136 139   < > 136   < > 136   < > 136  136  --    < > 144 134* 137   POTASSIUM 4.1 3.9  3.9  --  3.8 3.9 3.9 4.1 3.9   < > 4.4   < > 4.6   < > 4.4  4.4  --    < > 3.6 4.1 3.9   CHLORIDE 104 102  102  --  105 105 108* 103 107   < > 102   < > 104   < > 103  103  --    < > 101 95* 97*   CO2 20.8* 19.3*  19.3*  --  21.5* 22.7 21.5* 20.3* 22.3   < > 20.9*   < > 19.7*   < > 22.1  22.1  --    < > 36.5* 28.3 30.6*   BUN 38* 18  18  --  17 16 17 17 19   < > 20   < > 10   < > 9  9  --    < > 16 15 12   CREATININE 3.32* 1.55*  1.55* 1.49* 1.61* 1.73* 1.68* 1.75* 1.82*   < > 2.16*   < > 1.39*   < > 1.81*  1.81*  --    < > 4.90* 4.53* 3.59*   GLUCOSE 172* 236*  236*  --  220* 181* 148* 188* 160*   < > 136*   < > 138*   < > 75  75  --    < > 125* 86 107*   CALCIUM 10.2 9.7  9.7  --  9.2 8.9 8.8 8.6 8.5*   < > 9.1   < > 8.3*   < > 8.3*  8.3*  --    < > 10.1 9.1 9.4   PHOSPHORUS 2.4*  2.8  --  2.4* 2.6 2.6 2.7 3.0   < > 4.0   < > 4.4   < > 3.5  --    < > 5.7*  --   --    TOTAL PROTEIN 7.9 9.0*  --   --   --   --   --   --   --   --   --   --   --  6.4  --   --   --  7.0 7.4   ALBUMIN 3.9 3.9  3.9  --  3.5 3.3* 3.0* 3.1* 2.9*   < > 3.2*   < > 2.8*   < > 2.8*  2.8*  --    < > 2.7* 3.0* 3.3*   GLOBULIN 4.0 5.1  --   --   --   --   --   --   --   --   --   --   --  3.6  --   --   --  4.0 4.1    < > = values in this interval not displayed.     XR Chest 1 View    Result Date: 12/11/2024  XR CHEST 1 VW Date of Exam: 12/11/2024 4:45 AM EST Indication: Decreased oxygen saturation Comparison: 12/10/2024 Findings: Heart size is stable. Feeding tube tip is not seen but it is below the diaphragm. Right chest tube has been removed. No pneumothorax on either side of the chest. There is a persistent pleural effusion with bibasilar consolidations, right worse than left.  Subcutaneous emphysema is noted on both sides of the chest.     Impression: 1.Interval removal of right chest tube without evidence of pneumothorax. 2.Persistent pleural effusions with bibasilar consolidations, right worse than left. 3.Subcutaneous emphysema. Electronically Signed: Cristian Tiwari MD  12/11/2024 5:15 AM EST  Workstation ID: [id number]    XR Chest 1 View    Result Date: 12/10/2024  XR CHEST 1 VW Date of Exam: 12/10/2024 2:00 AM EST Indication: RECHECK FOR PNEUMO Comparison: 12/9/2024 Findings: Feeding tube tip is below the diaphragm but not seen. Heart remains enlarged. Right chest tube remains in place. No pneumothorax identified on either side of the chest. There is continued infiltrate at the right base. There is improved aeration at the left lung base. Subcutaneous emphysema is again seen on both sides of the chest.     Impression: 1.No evidence of pneumothorax. 2.Improved aeration at the left lung base. 3.Persistent infiltrate at the right lung base. 4.Subcutaneous emphysema. Electronically Signed: Cristian Tiwari MD   12/10/2024 2:10 AM EST  Workstation ID: [id number]    XR Chest 1 View    Result Date: 12/9/2024  XR CHEST 1 VW Date of Exam: 12/9/2024 4:56 PM EST Indication: Change in breathing, increased coughing Comparison: 12/9/2020 Findings: Nasogastric feeding tube remains in place and unchanged in position. Heart size and pulmonary vessels are within normal limits. Again seen is a large amount of subcutaneous emphysema throughout the chest. This appears similar to the prior study. Right-sided thoracostomy tube remains in place and unchanged in position. No definite pneumothorax identified. There is patchy bilateral airspace disease which is minimally improved from prior study. Lung volumes remain low. No definite pleural effusion.     Impression: Impression: 1. No change in subcutaneous emphysema. 2. Slight improvement in patchy bilateral airspace disease. 3. No change in right thoracostomy tube. No definite pneumothorax identified. Electronically Signed: Stephan Dey MD  12/9/2024 5:18 PM EST  Workstation ID: [id number]     Results for orders placed during the hospital encounter of 12/05/24    Adult Transthoracic Echo Complete W/ Cont if Necessary Per Protocol    Interpretation Summary    The quality of the study is limited due to limited views obtained    Left ventricular systolic function is normal. Calculated left ventricular EF = 57.8%    The right ventricular cavity is mildly dilated.    The left atrial cavity is mildly dilated.    There is mild calcification of the aortic valve no evidence of vegetation seen    Blood cultures with Candida and Staph epidermidis  Repeat blood cultures negative    EEG with no seizures.  Findings more consistent with some mild encephalopathy      Assessment & Plan   Assessment / Plan       Active Hospital Problems:  Active Hospital Problems    Diagnosis     **Hypoxia    Status post cardiac arrest secondary to hypoxia  Acute hypoxemic and hypercapnic respiratory failure require mechanical  ventilation  Hemoptysis  Concern for intraparenchymal bleed   Status post thoracentesis  Candidemia  Acute on chronic hypoxic respiratory failure  Acute cardiogenic pulmonary edema  End-stage renal disease on dialysis leading to heart failure  Acute diastolic heart failure with EF of 56-60  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  Will make BiPAP as needed.  Wean O2 to keep SPO2 greater than 90%  Currently not on pressors.  Continue midodrine 10 mg 3 times daily.  Arterial line and temporary dialysis catheter removed yesterday  Appreciate nephrology input.  Plans to dialyze today via fistula  Anticipate mental status to slowly improve in the upcoming days  EEG reviewed.  Findings more consistent with encephalopathy.  Will discontinue Keppra.  Has Seroquel on hold.  May need to consider restarting this nightly at a lower dose or considering as needed Haldol delirium persist.  From a neurological perspective, I think he is slowly improved each day I have seen him  On day 6 of micafungin for Candida fungemia.  Repeat cultures negative  On day 6 of vancomycin for Staph epidermidis bacteremia.  Recently completed 6 weeks of antibiotics for Staph epidermidis discitis.  Per ID, they would like a CT of the abdomen and pelvis as well as a MRI of the T and L-spine.  Repeat blood cultures negative.  I think for us to do any of this imaging we would have to sedate him in his current state as he would have quite a bit of motion artifact.  I think that would make his respiratory status much more tenuous.  At this point we will monitor over time and image when clinically stable  Transfuse for hemoglobin less than 7  Continue  Brovana, Pulmicort and add DuoNeb.  Bronchoscopy cultures negative so far.  Tube feeds at goal per  dietitian recommendations  DNR for now.  Family okay with intubation    Discussed with family at bedside  Hopefully can transfer out of the ICU in the next 24 hours    VTE Prophylaxis:  Pharmacologic & mechanical VTE prophylaxis orders are present.    CODE STATUS:   Level Of Support Discussed With: Next of Kin (If No Surrogate)  Code Status (Patient has no pulse and is not breathing): No CPR (Do Not Attempt to Resuscitate)  Medical Interventions (Patient has pulse or is breathing): Full Support    Discussed with family at bedside    Patient is critically ill in ICU with status post cardiac arrest, hypoxic arrest, hemoptysis, hypovolemic shock, ESRD, respiratory failure, altered mental status, bacteremia and fungemia. I spent 32 minutes of critical care time excluding any procedure notes, spent in review, analysis, obtaining history and physical, formulating care plan, and I led multi-disciplinary critical care rounds with bedside nurse, respiratory therapist, clinical pharmacist and other allied services. I have discussed the case with primary service and other consultants as well.     Electronically signed by Scottie Valentin MD, 12/12/2024, 11:26 EST.

## 2024-12-12 NOTE — PROGRESS NOTES
[facility]     Nephrology Progress Note      Patient Name: Nelson Wang  : 1945  MRN: [id number]  Primary Care Physician:  Domingo Bravo MD  Date of admission: 2024    Subjective   Subjective     Interval History:  Events noted.    Mentation still altered, family at bedside.  On Levophed.     Review of Systems   Review of systems could not be obtained due to   patient intubated.    Objective   Objective     Vitals:   Temp:  [97.3 °F (36.3 °C)-98.9 °F (37.2 °C)] 97.7 °F (36.5 °C)  Heart Rate:  [] 91  Resp:  [16-24] 18  BP: ()/(39-77) 127/53  Arterial Line BP: ()/(40-54) 129/49  Flow (L/min) (Oxygen Therapy):  [4-6] 4  Physical Exam:   Constitutional: Somewhat restless, on O2 per nasal cannula    Eyes: sclerae anicteric, no conjunctival injection   HENT: mucous membranes moist   Neck: Supple, no thyromegaly, no lymphadenopathy, trachea midline, No JVD   Respiratory: Decreased  to auscultation bilaterally, nonlabored respirations,  Subcutaneous emphysema.   Cardiovascular: Irregular, tachycardic, no murmurs, rubs, or gallops.   Gastrointestinal: Positive bowel sounds, soft, nondistended   Musculoskeletal: No edema, no clubbing or cyanosis, left upper extremity AV fistula is patent with good thrill and bruit.   Psychiatric: Restless   Neurologic: Staring at the ceiling, not following commands, moving extremities.   Skin: warm and dry, no rashes   Right femoral A-line and Shiley.     Result Review    Result Reviewed:  I have personally reviewed the results from the time of this admission to 2024 10:55 EST and agree with these findings:  [x]  Laboratory  []  Microbiology  [x]  Radiology  []  EKG/Telemetry   []  Cardiology/Vascular   []  Pathology  [x]  Old records  []  Other:        Lab 24  0417 24  0611 24  0431 24  0008 12/10/24  1814 12/10/24  1221 12/10/24  0620 24  2318 24  1830 24  1220 24  0622 24  0009  12/08/24  0410 12/07/24  2346 12/07/24  1822 12/07/24  1154 12/07/24  0606 12/07/24  0007 12/06/24  1409 12/06/24  1248 12/06/24  1238 12/06/24  0748 12/05/24  1635   SODIUM 139 135*  135*  --  137 138 138 136 139 139 138 136 140 136 136 137 136 136  136 140  --   --  144 134* 137   POTASSIUM 4.1 3.9  3.9  --  3.8 3.9 3.9 4.1 3.9 4.2 3.7 4.4 4.4 4.4 4.6 4.6 4.5 4.4  4.4 4.2  --   --  3.6 4.1 3.9   CHLORIDE 104 102  102  --  105 105 108* 103 107 107 112* 102 106 102 104 103 102 103  103 104  --   --  101 95* 97*   CO2 20.8* 19.3*  19.3*  --  21.5* 22.7 21.5* 20.3* 22.3 21.1* 18.5* 20.9* 20.7* 19.5* 19.7* 21.1* 21.7* 22.1  22.1 24.2  --   --  36.5* 28.3 30.6*   BUN 38* 18  18  --  17 16 17 17 19 21 18 20 22 13 10 9 9 9  9 10  --   --  16 15 12   CREATININE 3.32* 1.55*  1.55* 1.49* 1.61* 1.73* 1.68* 1.75* 1.82* 2.09* 1.71* 2.16* 2.31* 1.45* 1.39* 1.37* 1.45* 1.81*  1.81* 2.13* 4.59* 4.46* 4.90* 4.53* 3.59*   GLUCOSE 172* 236*  236*  --  220* 181* 148* 188* 160* 148* 121* 136* 146* 142* 138* 83 98 75  75 80  --   --  125* 86 107*   EGFR 18.3* 45.5*  45.5*  --  43.5* 39.9* 41.3* 39.4* 37.6* 31.8* 40.5* 30.6* 28.2* 49.3* 51.9* 52.8* 49.3* 37.8*  37.8* 31.1*  --   --  11.4* 12.6* 16.6*   ANION GAP 14.2 13.7  13.7  --  10.5 10.3 8.5 12.7 9.7 10.9 7.5 13.1 13.3 14.5 12.3 12.9 12.3 10.9  10.9 11.8  --   --  6.5 10.7 9.4   MAGNESIUM 2.8* 2.5*  --   --   --   --  2.4  --   --   --  2.6*  --  2.3 2.3  --   --  2.1  --   --   --  1.9 1.8  --    PHOSPHORUS 2.4* 2.8  --  2.4* 2.6 2.6 2.7 3.0 3.4 3.0 4.0 4.0 3.5 4.4 4.6* 3.8 3.5 2.7  --   --  5.7*  --   --              Assessment & Plan   Assessment / Plan       Active Hospital Problems:  Active Hospital Problems    Diagnosis     **Hypoxia        Assessment and Plan:    - ESRD, was on home dialysis. Off CRRT.  Still on Levophed, low blood pressure last night, no ultrafiltration.  Electrolytes are stable.    Next dialysis possibly tomorrow through left upper  extremity AV fistula or right femoral Shiley.    - Volume overload, much better now.       - Hypoxia on admission with large pleural effusion, status post thoracentesis complicated by pulmonary hemorrhage, status post right-sided chest tube and bronchoscopy.  Intubation then extubation, per pulmonary.    - Aspiration pneumonia, per pulmonary.     - Type 2 diabetes with complications.    - History of hypertension, blood pressure is acceptable now on small dose pressors .    - Anemia of chronic kidney disease, hemoglobin below goal.  On RONALD as outpatient.  Getting Epogen while here.    - Altered mentation.

## 2024-12-12 NOTE — PLAN OF CARE
Goal Outcome Evaluation:      Patient very restless throughout night, would not tolerate bipap. Moved independently in bed throughout the night unassisted. Remained off levo gtt, tube feeds at goal. Not following commands in english, does follow minimal commands in native language that is communicated through family members. Possible HD today. JOANIE RN

## 2024-12-12 NOTE — SIGNIFICANT NOTE
12/12/24 1041   OTHER   Discipline occupational therapist   Rehab Time/Intention   Session Not Performed other (see comments)  (Hold per nsg)

## 2024-12-12 NOTE — PROGRESS NOTES
[facility] Clinical Pharmacy Services: Vancomycin Monitoring Note    Nelson Wang is a 79 y.o. male who is on day 41/42 of pharmacy to dose vancomycin for Bacteremia, Bone and/or Joint Infection, and Empiric.    Previous Vancomycin Dose: 750mg IV q12h   Imaging Reviewed?: Yes  Updated Cultures and Sensitivities:   Microbiology Results (last 10 days)       Procedure Component Value - Date/Time    Blood Culture - Blood, Blood, Central Line [[id number]]  (Normal) Collected: 12/07/24 1023    Lab Status: Final result Specimen: Blood, Central Line Updated: 12/12/24 1030     Blood Culture No growth at 5 days    Blood Culture - Blood, Blood, Arterial Line [[id number]]  (Normal) Collected: 12/07/24 1023    Lab Status: Final result Specimen: Blood, Arterial Line Updated: 12/12/24 1046     Blood Culture No growth at 5 days    Pneumonia Panel - Lavage, Lung, Right Lower Lobe [[id number]]  (Normal) Collected: 12/06/24 1719    Lab Status: Final result Specimen: Lavage from Lung, Right Lower Lobe Updated: 12/06/24 1859     Escherichia coli PCR Not Detected     Acinetobacter calcoaceticus-baumannii complex PCR Not Detected     Enterobacter cloacae PCR Not Detected     Klebsiella oxytoca PCR Not Detected     Klebsiella pneumoniae group PCR Not Detected     Klebsiella aerogenes PCR Not Detected     Moraxella catarrhalis PCR Not Detected     Proteus species PCR Not Detected     Pseudomonas aeroginosa PCR Not Detected     Serratia marcescens PCR Not Detected     Staphylococcus aureus PCR Not Detected     Streptococcus pyogenes PCR Not Detected     Haemophilus influenzae PCR Not Detected     Streptococcus agalactiae PCR Not Detected     Streptococcus pneumoniae PCR Not Detected     Chlamydophila pneumoniae PCR Not Detected     Legionella pneumophilia PCR Not Detected     Mycoplasma pneumo by PCR Not Detected     ADENOVIRUS, PCR Not Detected     CTX-M Gene N/A     IMP Gene N/A     KPC Gene N/A     mecA/C and MREJ Gene N/A     NDM Gene  N/A     OXA-48-like Gene N/A     VIM Gene N/A     Coronavirus Not Detected     Human Metapneumovirus Not Detected     Human Rhinovirus/Enterovirus Not Detected     Influenza A PCR Not Detected     Influenza B PCR Not Detected     RSV, PCR Not Detected     Parainfluenza virus PCR Not Detected    BAL Culture, Quantitative - Lavage, Bronchus [[id number]] Collected: 12/06/24 1719    Lab Status: Final result Specimen: Lavage from Bronchus Updated: 12/08/24 1013     BAL Culture >100,000 CFU/mL Normal respiratory lashae. No S. aureus or Pseudomonas aeruginosa detected. Final report.     Gram Stain No organisms seen    Anaerobic Culture - Pleural Fluid, Pleural Cavity [[id number]]  (Normal) Collected: 12/06/24 1235    Lab Status: Final result Specimen: Pleural Fluid from Pleural Cavity Updated: 12/11/24 0657     Anaerobic Culture No anaerobes isolated at 5 days    AFB Culture - Body Fluid, Pleural Cavity [[id number]] Collected: 12/06/24 1233    Lab Status: Preliminary result Specimen: Body Fluid from Pleural Cavity Updated: 12/11/24 1245     AFB Culture No AFB isolated at less than 1 week     AFB Stain Few (2+) WBCs seen      No organisms seen      No acid fast bacilli seen on direct smear    Body Fluid Culture - Body Fluid, Pleural Cavity [[id number]] Collected: 12/06/24 1233    Lab Status: Final result Specimen: Body Fluid from Pleural Cavity Updated: 12/09/24 0952     Body Fluid Culture No growth at 3 days     Gram Stain Few (2+) WBCs seen      No organisms seen    Fungus Culture - Body Fluid, Pleural Cavity [[id number]] Collected: 12/06/24 1233    Lab Status: Preliminary result Specimen: Body Fluid from Pleural Cavity Updated: 12/11/24 1246     Fungus Culture No fungus isolated at less than 1 week    Blood Culture - Blood, Arm, Left [[id number]]  (Abnormal)  (Susceptibility) Collected: 12/05/24 8283    Lab Status: Final result Specimen: Blood from Arm, Left Updated: 12/11/24 0759     Blood Culture Staphylococcus epidermidis      Isolated from Aerobic Bottle     Blood Culture Streptococcus mutans     Isolated from Anaerobic Bottle     Gram Stain Aerobic Bottle Gram positive cocci in clusters      Anaerobic Bottle Gram positive cocci in chains    Narrative:            Susceptibility        Staphylococcus epidermidis      RITU      Oxacillin >=4 ug/ml Resistant      Vancomycin 1 ug/ml Susceptible                       Susceptibility        Streptococcus mutans      RITU      Ceftriaxone <=0.12 ug/ml Susceptible      Penicillin G <=0.06 ug/ml Susceptible      Vancomycin 1 ug/ml Susceptible                           Blood Culture - Blood, Arm, Left [[id number]]  (Abnormal) Collected: 12/05/24 1755    Lab Status: Final result Specimen: Blood from Arm, Left Updated: 12/11/24 0729     Blood Culture Staphylococcus epidermidis     Isolated from Aerobic and Anaerobic Bottles     Blood Culture Streptococcus mutans     Isolated from Anaerobic Bottle     Blood Culture Corynebacterium species     Isolated from Anaerobic Bottle     Gram Stain Aerobic Bottle Gram positive cocci in clusters      Anaerobic Bottle Gram positive cocci in chains and clusters    Narrative:      Refer to previous blood culture collected on 12/05/2024 1755 for MICs  Corynebacterium is probable contaminant requires clinical correlation, susceptibility not performed unless requested by physician.       Blood Culture ID, PCR - Blood, Arm, Left [[id number]]  (Abnormal) Collected: 12/05/24 1755    Lab Status: Final result Specimen: Blood from Arm, Left Updated: 12/06/24 1617     BCID, PCR Staph spp, not aureus or lugdunensis. Identification by BCID2 PCR.     BCID, PCR 2 Streptococcus spp, not A, B, or pneumoniae. Identification by BCID2 PCR.     BOTTLE TYPE Aerobic Bottle    Narrative:      Staph Sp mecA/C    Blood Culture ID, PCR - Blood, Arm, Left [[id number]]  (Abnormal) Collected: 12/05/24 1755    Lab Status: Final result Specimen: Blood from Arm, Left Updated: 12/07/24 0943     BCID,  "PCR Staph spp, not aureus or lugdunensis. Identification by BCID2 PCR.     BCID, PCR 2 Streptococcus spp, not A, B, or pneumoniae. Identification by BCID2 PCR.     BOTTLE TYPE Anaerobic Bottle    Narrative:      S.epi with mecA/C    Blood Culture ID, PCR - Blood, Arm, Left [[id number]]  (Abnormal) Collected: 24    Lab Status: Final result Specimen: Blood from Arm, Left Updated: 24     BCID, PCR Streptococcus spp, not A, B, or pneumoniae. Identification by BCID2 PCR.     BCID, PCR 2 Candida glabrata. Identification by BCID2 PCR.     BOTTLE TYPE Anaerobic Bottle    Narrative:      Infectious disease consultation is highly recommended to rule out distant foci of infection.              Vitals/Labs  Ht: 170.2 cm (67\"); Wt: 69.3 kg (152 lb 12.5 oz)   Temp (24hrs), Av.1 °F (36.7 °C), Min:97.3 °F (36.3 °C), Max:98.9 °F (37.2 °C)   Estimated Creatinine Clearance: 18 mL/min (A) (by C-G formula based on SCr of 3.32 mg/dL (H)).   Hemodialysis, schedule to be determined, per pulm and nursing plan is to restart 2024    Results from last 7 days   Lab Units 24  0417 24  0611 24  0431 12/10/24  1221 12/10/24  0620 24  1830 24  1522 24  1220 24  0622 24  0410 24  2346 24  1154 24  0606 24  1238 24  0748   VANCOMYCIN RM mcg/mL 30.30 27.18  --   --   --   --   --   --  18.24  --  23.90  --  14.16  --  27.00   VANCOMYCIN TR mcg/mL  --   --   --   --   --   --  17.48  --   --   --   --   --   --   --   --    CREATININE mg/dL 3.32* 1.55*  1.55* 1.49*   < > 1.75*   < >  --    < > 2.16*   < > 1.39*   < > 1.81*  1.81*   < > 4.53*   WBC 10*3/mm3 7.97 9.57  --   --  5.77  --   --   --  5.23  --  6.95  --  6.28   < > 3.73    < > = values in this interval not displayed.     Assessment/Plan    Current Vancomycin Dose: Pulse dose s/t dialysis  HD scheduled for tomorrow.   Vancomycin random this morning was 30.3 . Will hold vancomycin " today.  Recheck random level in AM in case therapy is extended.   We will continue to monitor patient changes and renal function     Thank you for involving pharmacy in this patient's care. Please contact pharmacy with any questions or concerns.    Xochilt Neff [facility]  Clinical Pharmacist

## 2024-12-12 NOTE — PROGRESS NOTES
[facility]   Hospitalist Progress Note  Date: 2024  Patient Name: Nelson Wang  : 1945  MRN: [id number]  Date of admission: 2024  Room/Bed: [facility]      Subjective   Subjective     Chief Complaint: Shortness of breath    Summary:    Nelson Wang is a 79 y.o. 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  Patient was started on supplemental oxygen to maintain his oxygen saturations.  He was given a dose of Bumex overnight.  His chest x-ray was consistent with bilateral pleural effusions.  Pulmonology was consulted for the bilateral pleural effusions and and nephrology consulted for possible volume overload.  Pulmonology was performing a thoracentesis this morning.  Thoracentesis was showing bloody fluid.  Patient suffered CODE BLUE. Likely bleeding into his airways causing hypoxic arrest.  He received CPR for 6 minutes and achieved ROSC.  He was transferred to the ICU, intubated and on blood pressure support.  Bronchoscopy was showing blood clots in his airways.  Patient was started on CRRT.  Patient's medical status is tenuous.  He is currently on 1 pressor. CRRT is removing fluid at a slow rate.  Patient started on micafungin on 2024.  Patient extubated on 2024.  Patient is on high flow nasal cannula 30 L 25%.     Interval Followup:   Continued stability in heart rate and blood pressure.  Mental status still poor.  Family at bedside states patient is intermittently following commands, like squeezing hand.  EEG has returned with no seizure-like activity.  Discussed with critical care doctor and we will hold further doses of Keppra has to hold further sedating medications.  No longer on CRRT, possibly receiving dialysis today versus tomorrow per nephrology.    Objective   Objective     Vitals:   Temp:  [97.3 °F (36.3 °C)-98.9  °F (37.2 °C)] 97.7 °F (36.5 °C)  Heart Rate:  [] 99  Resp:  [16-28] 28  BP: ()/(39-77) 123/65  Flow (L/min) (Oxygen Therapy):  [2-6] 2    Physical Exam   General: Moves spontaneously, not interacting appropriately, minimally opening eyes spontaneously  Cardiovascular: RRR, no murmurs   Pulmonary: nasal cannula in place, coarse breath sounds  Gastrointestinal: Abdomen is soft  Musculoskeletal: No lower extremity edema  Neuro: Unable to assess mental status    Result Review    Result Review:  I have personally reviewed these results:  [x]  Laboratory      Lab 12/12/24  0417 12/11/24  0611 12/11/24  0431 12/10/24  0620 12/09/24  0622 12/06/24  1248 12/06/24  1238 12/06/24  1206 12/06/24  0748   WBC 7.97 9.57  --  5.77 5.23   < > 5.95  --  3.73   HEMOGLOBIN 10.6* 11.4*  --  9.1* 8.8*   < > 6.9*  --  8.1*   HEMATOCRIT 35.8* 38.1  --  30.5* 29.5*   < > 24.6*  --  28.2*   PLATELETS 146 148  --  124* 115*   < > 136*  --  141   NEUTROS ABS 4.56 5.88  6.12  --  3.57 3.82   < > 1.40*  --  1.66*   IMMATURE GRANS (ABS) 0.12* 0.25*  --  0.05 0.03   < > 0.10*  --  0.01   LYMPHS ABS 1.48 1.69  --  1.14 0.76   < > 3.88*  --  1.41   MONOS ABS 1.50* 1.32*  --  0.71 0.55   < > 0.48  --  0.53   EOS ABS 0.23 0.34  0.29  --  0.24 0.03   < > 0.07  --  0.09   MCV 89.3 92.3  --  91.3 91.6   < > 96.5  --  94.9   PROCALCITONIN  --   --   --   --   --   --   --   --  0.27*   LACTATE  --   --  2.2* 1.3 1.1   < > 3.9*   < >  --    PROTIME  --   --   --   --   --   --  15.2*  --   --    APTT  --   --   --   --   --   --  31.1  --   --     < > = values in this interval not displayed.         Lab 12/12/24  0417 12/11/24  0611 12/11/24  0431 12/11/24  0008 12/10/24  1221 12/10/24  0620   SODIUM 139 135*  135*  --  137   < > 136   POTASSIUM 4.1 3.9  3.9  --  3.8   < > 4.1   CHLORIDE 104 102  102  --  105   < > 103   CO2 20.8* 19.3*  19.3*  --  21.5*   < > 20.3*   ANION GAP 14.2 13.7  13.7  --  10.5   < > 12.7   BUN 38* 18  18  --   17   < > 17   CREATININE 3.32* 1.55*  1.55* 1.49* 1.61*   < > 1.75*   EGFR 18.3* 45.5*  45.5*  --  43.5*   < > 39.4*   GLUCOSE 172* 236*  236*  --  220*   < > 188*   CALCIUM 10.2 9.7  9.7  --  9.2   < > 8.6   MAGNESIUM 2.8* 2.5*  --   --   --  2.4   PHOSPHORUS 2.4* 2.8  --  2.4*   < > 2.7    < > = values in this interval not displayed.         Lab 12/12/24  0417 12/11/24  0611 12/11/24  0008 12/07/24  1154 12/07/24  0606   TOTAL PROTEIN 7.9 9.0*  --   --  6.4   ALBUMIN 3.9 3.9  3.9 3.5   < > 2.8*  2.8*   GLOBULIN 4.0 5.1  --   --  3.6   ALT (SGPT) 23 36  --   --  47*   AST (SGOT) 39 61*  --   --  138*   BILIRUBIN 1.4* 1.6*  --   --  1.2   ALK PHOS 76 94  --   --  63    < > = values in this interval not displayed.         Lab 12/06/24  1238 12/06/24  0748 12/05/24  1854 12/05/24  1635   PROBNP  --   --   --  59,503.0*   HSTROP T  --  128* 113* 103*   PROTIME 15.2*  --   --   --    INR 1.17*  --   --   --              Lab 12/06/24  1507   ABO TYPING AB   RH TYPING Positive   ANTIBODY SCREEN Negative         Lab 12/11/24  0431 12/08/24  0700 12/07/24  0912   PH, ARTERIAL 7.302* 7.309* 7.376   PCO2, ARTERIAL 45.6* 41.1 32.8*   PO2 ART 91.3 69.4* 91.0   O2 SATURATION ART 96.1 91.9* 97.0   FIO2 32 35 40   HCO3 ART 22.5 20.6* 19.2*   BASE EXCESS ART -4.0* -5.3* -5.3*     Brief Urine Lab Results  (Last result in the past 365 days)        Color   Clarity   Blood   Leuk Est   Nitrite   Protein   CREAT   Urine HCG        10/27/24 1417 Yellow   Cloudy   Negative   Trace   Negative   >=300 mg/dL (3+)                 [x]  Microbiology   Microbiology Results (last 10 days)       Procedure Component Value - Date/Time    Blood Culture - Blood, Blood, Central Line [[id number]]  (Normal) Collected: 12/07/24 1023    Lab Status: Final result Specimen: Blood, Central Line Updated: 12/12/24 1030     Blood Culture No growth at 5 days    Blood Culture - Blood, Blood, Arterial Line [[id number]]  (Normal) Collected: 12/07/24 1023    Lab  Status: Final result Specimen: Blood, Arterial Line Updated: 12/12/24 1046     Blood Culture No growth at 5 days    Pneumonia Panel - Lavage, Lung, Right Lower Lobe [[id number]]  (Normal) Collected: 12/06/24 1719    Lab Status: Final result Specimen: Lavage from Lung, Right Lower Lobe Updated: 12/06/24 1859     Escherichia coli PCR Not Detected     Acinetobacter calcoaceticus-baumannii complex PCR Not Detected     Enterobacter cloacae PCR Not Detected     Klebsiella oxytoca PCR Not Detected     Klebsiella pneumoniae group PCR Not Detected     Klebsiella aerogenes PCR Not Detected     Moraxella catarrhalis PCR Not Detected     Proteus species PCR Not Detected     Pseudomonas aeroginosa PCR Not Detected     Serratia marcescens PCR Not Detected     Staphylococcus aureus PCR Not Detected     Streptococcus pyogenes PCR Not Detected     Haemophilus influenzae PCR Not Detected     Streptococcus agalactiae PCR Not Detected     Streptococcus pneumoniae PCR Not Detected     Chlamydophila pneumoniae PCR Not Detected     Legionella pneumophilia PCR Not Detected     Mycoplasma pneumo by PCR Not Detected     ADENOVIRUS, PCR Not Detected     CTX-M Gene N/A     IMP Gene N/A     KPC Gene N/A     mecA/C and MREJ Gene N/A     NDM Gene N/A     OXA-48-like Gene N/A     VIM Gene N/A     Coronavirus Not Detected     Human Metapneumovirus Not Detected     Human Rhinovirus/Enterovirus Not Detected     Influenza A PCR Not Detected     Influenza B PCR Not Detected     RSV, PCR Not Detected     Parainfluenza virus PCR Not Detected    BAL Culture, Quantitative - Lavage, Bronchus [[id number]] Collected: 12/06/24 1719    Lab Status: Final result Specimen: Lavage from Bronchus Updated: 12/08/24 1013     BAL Culture >100,000 CFU/mL Normal respiratory lashae. No S. aureus or Pseudomonas aeruginosa detected. Final report.     Gram Stain No organisms seen    Anaerobic Culture - Pleural Fluid, Pleural Cavity [[id number]]  (Normal) Collected: 12/06/24 5733     Lab Status: Final result Specimen: Pleural Fluid from Pleural Cavity Updated: 12/11/24 0657     Anaerobic Culture No anaerobes isolated at 5 days    AFB Culture - Body Fluid, Pleural Cavity [[id number]] Collected: 12/06/24 1233    Lab Status: Preliminary result Specimen: Body Fluid from Pleural Cavity Updated: 12/11/24 1245     AFB Culture No AFB isolated at less than 1 week     AFB Stain Few (2+) WBCs seen      No organisms seen      No acid fast bacilli seen on direct smear    Body Fluid Culture - Body Fluid, Pleural Cavity [[id number]] Collected: 12/06/24 1233    Lab Status: Final result Specimen: Body Fluid from Pleural Cavity Updated: 12/09/24 0952     Body Fluid Culture No growth at 3 days     Gram Stain Few (2+) WBCs seen      No organisms seen    Fungus Culture - Body Fluid, Pleural Cavity [[id number]] Collected: 12/06/24 1233    Lab Status: Preliminary result Specimen: Body Fluid from Pleural Cavity Updated: 12/11/24 1246     Fungus Culture No fungus isolated at less than 1 week    Blood Culture - Blood, Arm, Left [[id number]]  (Abnormal)  (Susceptibility) Collected: 12/05/24 1755    Lab Status: Final result Specimen: Blood from Arm, Left Updated: 12/11/24 0729     Blood Culture Staphylococcus epidermidis     Isolated from Aerobic Bottle     Blood Culture Streptococcus mutans     Isolated from Anaerobic Bottle     Gram Stain Aerobic Bottle Gram positive cocci in clusters      Anaerobic Bottle Gram positive cocci in chains    Narrative:            Susceptibility        Staphylococcus epidermidis      RITU      Oxacillin Resistant      Vancomycin Susceptible                       Susceptibility        Streptococcus mutans      RITU      Ceftriaxone Susceptible      Penicillin G Susceptible      Vancomycin Susceptible                           Blood Culture - Blood, Arm, Left [[id number]]  (Abnormal) Collected: 12/05/24 1755    Lab Status: Final result Specimen: Blood from Arm, Left Updated: 12/11/24 0729      Blood Culture Staphylococcus epidermidis     Isolated from Aerobic and Anaerobic Bottles     Blood Culture Streptococcus mutans     Isolated from Anaerobic Bottle     Blood Culture Corynebacterium species     Isolated from Anaerobic Bottle     Gram Stain Aerobic Bottle Gram positive cocci in clusters      Anaerobic Bottle Gram positive cocci in chains and clusters    Narrative:      Refer to previous blood culture collected on 12/05/2024 1755 for MICs  Corynebacterium is probable contaminant requires clinical correlation, susceptibility not performed unless requested by physician.       Blood Culture ID, PCR - Blood, Arm, Left [[id number]]  (Abnormal) Collected: 12/05/24 1755    Lab Status: Final result Specimen: Blood from Arm, Left Updated: 12/06/24 1617     BCID, PCR Staph spp, not aureus or lugdunensis. Identification by BCID2 PCR.     BCID, PCR 2 Streptococcus spp, not A, B, or pneumoniae. Identification by BCID2 PCR.     BOTTLE TYPE Aerobic Bottle    Narrative:      Staph Sp mecA/C    Blood Culture ID, PCR - Blood, Arm, Left [[id number]]  (Abnormal) Collected: 12/05/24 1755    Lab Status: Final result Specimen: Blood from Arm, Left Updated: 12/07/24 0943     BCID, PCR Staph spp, not aureus or lugdunensis. Identification by BCID2 PCR.     BCID, PCR 2 Streptococcus spp, not A, B, or pneumoniae. Identification by BCID2 PCR.     BOTTLE TYPE Anaerobic Bottle    Narrative:      S.epi with mecA/C    Blood Culture ID, PCR - Blood, Arm, Left [[id number]]  (Abnormal) Collected: 12/05/24 1755    Lab Status: Final result Specimen: Blood from Arm, Left Updated: 12/07/24 0913     BCID, PCR Streptococcus spp, not A, B, or pneumoniae. Identification by BCID2 PCR.     BCID, PCR 2 Candida glabrata. Identification by BCID2 PCR.     BOTTLE TYPE Anaerobic Bottle    Narrative:      Infectious disease consultation is highly recommended to rule out distant foci of infection.          [x]  Radiology  XR Abdomen KUB    Result Date:  12/11/2024  Impression: 1.Lines and tubes as above. 2.Indeterminate bowel gas pattern. 3.Subcutaneous emphysema overlies the right lower chest. Bilateral pleural effusions. Electronically Signed: Carlos Joiner MD  12/11/2024 7:27 AM EST  Workstation ID: [id number]    XR Chest 1 View    Result Date: 12/11/2024  1.Interval removal of right chest tube without evidence of pneumothorax. 2.Persistent pleural effusions with bibasilar consolidations, right worse than left. 3.Subcutaneous emphysema. Electronically Signed: Cristian Tiwari MD  12/11/2024 5:15 AM EST  Workstation ID: [id number]    XR Chest 1 View    Result Date: 12/10/2024  1.No evidence of pneumothorax. 2.Improved aeration at the left lung base. 3.Persistent infiltrate at the right lung base. 4.Subcutaneous emphysema. Electronically Signed: Cristian Tiwari MD  12/10/2024 2:10 AM EST  Workstation ID: [id number]    XR Chest 1 View    Result Date: 12/9/2024  Impression: 1. No change in subcutaneous emphysema. 2. Slight improvement in patchy bilateral airspace disease. 3. No change in right thoracostomy tube. No definite pneumothorax identified. Electronically Signed: Stephan Dey MD  12/9/2024 5:18 PM EST  Workstation ID: [id number]    XR Chest 1 View    Result Date: 12/9/2024  Impression: 1.Interval extubation. 2.Right-sided chest tube in unchanged position. No definite pneumothorax. 3.Patchy right greater than left airspace disease, similar to the prior examination. 4.Subcutaneous emphysema throughout the chest, right greater than left. This has mildly improved from the prior exam. Electronically Signed: Reynaldo Rangel  12/9/2024 9:35 AM EST  Workstation ID: [id number]    XR Chest 1 View    Result Date: 12/8/2024  1.No significant interval change. 2.Right-sided chest tube without evidence of pneumothorax. 3.Bilateral infiltrates, right worse than left. Electronically Signed: Cristian Tiwari MD  12/8/2024 6:11 AM EST  Workstation ID: [id number]    XR Chest 1  View    Result Date: 12/7/2024  1.Right chest tube has been pulled back slightly, but the side ports appear to be within the pleural space. No definite pneumothorax. 2.Extensive subcutaneous gas in the chest wall. 3.Stable bilateral infiltrates. Electronically Signed: Cristian Tiwari MD  12/7/2024 8:31 PM EST  Workstation ID: [id number]    XR Abdomen KUB    Result Date: 12/7/2024  Impression: Weighted tip feeding catheter projects over the stomach. Electronically Signed: Claus Sánchez MD  12/7/2024 1:08 PM EST  Workstation ID: [id number]    XR Chest 1 View    Result Date: 12/7/2024  Impression: 1.Endotracheal tube in good position. 2.Right-sided chest tube in place. No pneumothorax identified. 3.Diffuse bilateral subcutaneous emphysema appears slightly improved. 4.Patchy airspace disease throughout right lung appears unchanged. Electronically Signed: Winston Reyes MD  12/7/2024 9:25 AM EST  Workstation ID: [id number]    XR Chest 1 View    Result Date: 12/6/2024  Impression: 1.Endotracheal tube tip is 3 cm above the mel. 2.Slight change in position of the right-sided chest tube with increased subcutaneous emphysema tracking throughout both sides of the chest, right worse than left. Evaluation for pneumothorax is limited due to the subcutaneous emphysema. 3.Decreased right-sided airspace disease compared to prior exam. Electronically Signed: Reynaldo Rangel  12/6/2024 4:35 PM EST  Workstation ID: [id number]    XR Chest 1 View    Result Date: 12/6/2024  Impression: 1.Interval retraction of the endotracheal tube which now appears in more appropriate position overlying the mid thoracic trachea. 2.Mildly displaced right lateral seventh rib fracture. There are also mildly displaced left posterior second and third rib fractures. These are new from prior same day radiographs. 3.Persistent multifocal airspace opacities throughout the right lung with right greater than left pleural effusions. 4.Mild interval retraction of the  right approach thoracostomy tube. Trace right apical pneumothorax. Increased right lateral chest wall emphysema. Electronically Signed: Devante Lang MD  12/6/2024 2:26 PM EST  Workstation ID: [id number]    XR Chest 1 View    Result Date: 12/6/2024  Impression: 1. Proximal right mainstem intubation. Retraction by 3.5 cm may place the ET tube in a more standard position. 2. Right thoracotomy tube without visible pneumothorax. Small volume right chest wall subcutaneous emphysema. 3. Multifocal airspace disease and/or atelectasis worsened in the right middle and upper lobes. Based on acuity this could reflect aspiration, hemorrhage or edema. 4. Right greater than left pleural effusions similar to may be slightly improved allowing for technical differences to prior chest radiograph. Findings communicated with ordering provider Vamshi Villafana via [employer] secure chat, message viewed at 12:13 p.m. 12/6/2024. Electronically Signed: Lorne Bustamante MD  12/6/2024 12:23 PM EST  Workstation ID: [id number]    XR Chest 1 View    Result Date: 12/6/2024  Impression: 1.Decreased right-sided pleural effusion status post thoracentesis. No pneumothorax. 2.Similar bilateral airspace opacities and small left pleural effusion. Electronically Signed: Devante Lang MD  12/6/2024 11:30 AM EST  Workstation ID: [id number]    CT Chest Without Contrast Diagnostic    Result Date: 12/6/2024  Impression: 1.Moderate right and small left pleural effusions with bibasilar consolidations, which could represent atelectasis, aspiration, or pneumonia. 2.Interlobular septal thickening bilaterally, suggesting mild pulmonary edema. 3.Cardiomegaly. 4.Erosive changes at T5-6 appear similar, suggesting ongoing discitis/osteomyelitis. Electronically Signed: Winston Reyes MD  12/6/2024 8:44 AM EST  Workstation ID: [id number]    XR Chest 1 View    Result Date: 12/5/2024  Impression: Similar moderate  CHF features. Electronically Signed: Jose Hammond MD  12/5/2024 4:35  PM EST  Workstation ID: [id number]   []  EKG/Telemetry   []  Cardiology/Vascular   []  Pathology  []  Old records  []  Other:    Assessment & Plan   Assessment / Plan     Status postcardiac arrest, secondary to hypoxia  Acute hypoxic/hypercapnic respiratory failure requiring mechanical ventilation  Hemoptysis  Altered mental status, concern for seizures  End-stage renal disease on dialysis  Acute on chronic diastolic heart failure  Staph epidermidis cystitis on vancomycin  Type 2 diabetes  Atrial fibrillation on Eliquis  Candidemia    Plan:  Patient remains admitted to the ICU for further care and management  Pulmonary critical care, nephrology consulted, appreciate assistance  For concerns of seizure-like activity, started empirically on Keppra.  EEG returned with no seizure-like activity.  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  Discussed with intensivist who discussed with ID stewardship.  Would like CT abdomen and pelvis as well as MRI of spine.  However given his respiratory status, do not feel he would tolerate being in scanner.  Continue to monitor daily  Patient's blood cultures from 12/7/2024 remain no growth to date, x5 days  Continue NIPPV as tolerated  Continue core track, continue tube feeds per dietitian    Discussed with RN.  Discussed with intensivist, family at bedside    VTE Prophylaxis:  Pharmacologic & mechanical VTE prophylaxis orders are present.        CODE STATUS:   Level Of Support Discussed With: Next of Kin (If No Surrogate)  Code Status (Patient has no pulse and is not breathing): No CPR (Do Not Attempt to Resuscitate)  Medical Interventions (Patient has pulse or  is breathing): Full Support

## 2024-12-12 NOTE — PROGRESS NOTES
Nutrition Services    Patient Name: Nelson Wang  YOB: 1945  MRN: [id number]  Admission date: 12/5/2024    PROGRESS NOTE      Encounter Information: EN Follow Up       PO Diet: NPO Diet NPO Type: Strict NPO   PO Supplements: NPO   PO Intake:  NPO       Current nutrition support: Novasource Renal @ 40 mL/hr, flush 65 mL q4h  Provides: 1760 kcal, 80 g pro, 1015 mL (625 mL FW + 390 mL flush)    Prosource No Carb BID provides additional 120 kcal, 30 g pro/day    Total provided: 1880 kcal, 110 g pro, 1015 mL       Estimated Needs: 6262-2309 kcal, 106-141 g (1.5-2 g/70.7 kg CBW, HDU), Fluids per critical care/Renal MD       Nutrition support review: TF running at goal. No intolerances noted.        Labs (reviewed below): Reviewed.       GI Function:  Last noted BM 12/11.        Nutrition Intervention Updates: Plans for HD today. Continue with current nutrition plan of care.      Results from last 7 days   Lab Units 12/12/24  0417 12/11/24  0611 12/11/24  0431 12/11/24  0008 12/07/24  1154 12/07/24  0606   SODIUM mmol/L 139 135*  135*  --  137   < > 136  136   POTASSIUM mmol/L 4.1 3.9  3.9  --  3.8   < > 4.4  4.4   CHLORIDE mmol/L 104 102  102  --  105   < > 103  103   CO2 mmol/L 20.8* 19.3*  19.3*  --  21.5*   < > 22.1  22.1   BUN mg/dL 38* 18  18  --  17   < > 9  9   CREATININE mg/dL 3.32* 1.55*  1.55* 1.49* 1.61*   < > 1.81*  1.81*   CALCIUM mg/dL 10.2 9.7  9.7  --  9.2   < > 8.3*  8.3*   BILIRUBIN mg/dL 1.4* 1.6*  --   --   --  1.2   ALK PHOS U/L 76 94  --   --   --  63   ALT (SGPT) U/L 23 36  --   --   --  47*   AST (SGOT) U/L 39 61*  --   --   --  138*   GLUCOSE mg/dL 172* 236*  236*  --  220*   < > 75  75    < > = values in this interval not displayed.     Results from last 7 days   Lab Units 12/12/24  0417 12/11/24  0611 12/10/24  1221 12/10/24  0620   MAGNESIUM mg/dL 2.8* 2.5*  --  2.4   PHOSPHORUS mg/dL 2.4* 2.8   < > 2.7   HEMOGLOBIN g/dL 10.6* 11.4*  --  9.1*   HEMATOCRIT %  35.8* 38.1  --  30.5*    < > = values in this interval not displayed.     COVID19   Date Value Ref Range Status   10/08/2024 Not Detected Not Detected - Ref. Range Final     Lab Results   Component Value Date    HGBA1C 5.90 (H) 11/09/2024       RD to follow up per protocol.    Electronically signed by:  Emma Collazo RD  12/12/24 09:16 EST

## 2024-12-13 ENCOUNTER — APPOINTMENT (OUTPATIENT)
Dept: GENERAL RADIOLOGY | Facility: HOSPITAL | Age: 79
End: 2024-12-13
Payer: MEDICARE

## 2024-12-13 ENCOUNTER — APPOINTMENT (OUTPATIENT)
Dept: CT IMAGING | Facility: HOSPITAL | Age: 79
End: 2024-12-13
Payer: MEDICARE

## 2024-12-13 LAB
ALBUMIN SERPL-MCNC: 3.9 G/DL (ref 3.5–5.2)
ALBUMIN/GLOB SERPL: 1 G/DL
ALP SERPL-CCNC: 93 U/L (ref 39–117)
ALT SERPL W P-5'-P-CCNC: 20 U/L (ref 1–41)
ANION GAP SERPL CALCULATED.3IONS-SCNC: 13.1 MMOL/L (ref 5–15)
AST SERPL-CCNC: 36 U/L (ref 1–40)
BASOPHILS # BLD AUTO: 0.03 10*3/MM3 (ref 0–0.2)
BASOPHILS NFR BLD AUTO: 0.3 % (ref 0–1.5)
BILIRUB SERPL-MCNC: 1 MG/DL (ref 0–1.2)
BUN SERPL-MCNC: 66 MG/DL (ref 8–23)
BUN/CREAT SERPL: 12.7 (ref 7–25)
CALCIUM SPEC-SCNC: 10.4 MG/DL (ref 8.6–10.5)
CHLORIDE SERPL-SCNC: 102 MMOL/L (ref 98–107)
CO2 SERPL-SCNC: 24.9 MMOL/L (ref 22–29)
CREAT SERPL-MCNC: 5.19 MG/DL (ref 0.76–1.27)
DEPRECATED RDW RBC AUTO: 62.4 FL (ref 37–54)
EGFRCR SERPLBLD CKD-EPI 2021: 10.7 ML/MIN/1.73
EOSINOPHIL # BLD AUTO: 0.36 10*3/MM3 (ref 0–0.4)
EOSINOPHIL NFR BLD AUTO: 4 % (ref 0.3–6.2)
ERYTHROCYTE [DISTWIDTH] IN BLOOD BY AUTOMATED COUNT: 21.1 % (ref 12.3–15.4)
GLOBULIN UR ELPH-MCNC: 4.1 GM/DL
GLUCOSE BLDC GLUCOMTR-MCNC: 127 MG/DL (ref 70–99)
GLUCOSE BLDC GLUCOMTR-MCNC: 190 MG/DL (ref 70–99)
GLUCOSE BLDC GLUCOMTR-MCNC: 213 MG/DL (ref 70–99)
GLUCOSE BLDC GLUCOMTR-MCNC: 252 MG/DL (ref 70–99)
GLUCOSE BLDC GLUCOMTR-MCNC: 264 MG/DL (ref 70–99)
GLUCOSE SERPL-MCNC: 266 MG/DL (ref 65–99)
HCT VFR BLD AUTO: 39.2 % (ref 37.5–51)
HGB BLD-MCNC: 11.5 G/DL (ref 13–17.7)
IMM GRANULOCYTES # BLD AUTO: 0.15 10*3/MM3 (ref 0–0.05)
IMM GRANULOCYTES NFR BLD AUTO: 1.7 % (ref 0–0.5)
LYMPHOCYTES # BLD AUTO: 1.95 10*3/MM3 (ref 0.7–3.1)
LYMPHOCYTES NFR BLD AUTO: 21.7 % (ref 19.6–45.3)
MAGNESIUM SERPL-MCNC: 3.1 MG/DL (ref 1.6–2.4)
MCH RBC QN AUTO: 27.2 PG (ref 26.6–33)
MCHC RBC AUTO-ENTMCNC: 29.3 G/DL (ref 31.5–35.7)
MCV RBC AUTO: 92.7 FL (ref 79–97)
MONOCYTES # BLD AUTO: 1.24 10*3/MM3 (ref 0.1–0.9)
MONOCYTES NFR BLD AUTO: 13.8 % (ref 5–12)
NEUTROPHILS NFR BLD AUTO: 5.25 10*3/MM3 (ref 1.7–7)
NEUTROPHILS NFR BLD AUTO: 58.5 % (ref 42.7–76)
NRBC BLD AUTO-RTO: 1.3 /100 WBC (ref 0–0.2)
PHOSPHATE SERPL-MCNC: 1.6 MG/DL (ref 2.5–4.5)
PHOSPHATE SERPL-MCNC: 2.2 MG/DL (ref 2.5–4.5)
PLATELET # BLD AUTO: 182 10*3/MM3 (ref 140–450)
PMV BLD AUTO: 10.8 FL (ref 6–12)
POTASSIUM SERPL-SCNC: 4.1 MMOL/L (ref 3.5–5.2)
PROT SERPL-MCNC: 8 G/DL (ref 6–8.5)
RBC # BLD AUTO: 4.23 10*6/MM3 (ref 4.14–5.8)
SODIUM SERPL-SCNC: 140 MMOL/L (ref 136–145)
VANCOMYCIN SERPL-MCNC: 28.73 MCG/ML (ref 5–40)
WBC NRBC COR # BLD AUTO: 8.98 10*3/MM3 (ref 3.4–10.8)

## 2024-12-13 PROCEDURE — 84100 ASSAY OF PHOSPHORUS: CPT | Performed by: INTERNAL MEDICINE

## 2024-12-13 PROCEDURE — 82948 REAGENT STRIP/BLOOD GLUCOSE: CPT | Performed by: NURSE PRACTITIONER

## 2024-12-13 PROCEDURE — 99291 CRITICAL CARE FIRST HOUR: CPT | Performed by: INTERNAL MEDICINE

## 2024-12-13 PROCEDURE — 97165 OT EVAL LOW COMPLEX 30 MIN: CPT

## 2024-12-13 PROCEDURE — 80053 COMPREHEN METABOLIC PANEL: CPT | Performed by: INTERNAL MEDICINE

## 2024-12-13 PROCEDURE — 71045 X-RAY EXAM CHEST 1 VIEW: CPT

## 2024-12-13 PROCEDURE — 25010000003 DEXTROSE 5 % SOLUTION: Performed by: PHYSICIAN ASSISTANT

## 2024-12-13 PROCEDURE — 94761 N-INVAS EAR/PLS OXIMETRY MLT: CPT

## 2024-12-13 PROCEDURE — 63710000001 INSULIN LISPRO (HUMAN) PER 5 UNITS: Performed by: NURSE PRACTITIONER

## 2024-12-13 PROCEDURE — 94760 N-INVAS EAR/PLS OXIMETRY 1: CPT

## 2024-12-13 PROCEDURE — 94799 UNLISTED PULMONARY SVC/PX: CPT

## 2024-12-13 PROCEDURE — 80202 ASSAY OF VANCOMYCIN: CPT | Performed by: INTERNAL MEDICINE

## 2024-12-13 PROCEDURE — 70450 CT HEAD/BRAIN W/O DYE: CPT

## 2024-12-13 PROCEDURE — 99232 SBSQ HOSP IP/OBS MODERATE 35: CPT | Performed by: INTERNAL MEDICINE

## 2024-12-13 PROCEDURE — 25010000003 DEXTROSE 5 % SOLUTION: Performed by: INTERNAL MEDICINE

## 2024-12-13 PROCEDURE — 94664 DEMO&/EVAL PT USE INHALER: CPT

## 2024-12-13 PROCEDURE — 25010000002 MICAFUNGIN SODIUM 100 MG RECONSTITUTED SOLUTION 1 EACH VIAL: Performed by: INTERNAL MEDICINE

## 2024-12-13 PROCEDURE — 82948 REAGENT STRIP/BLOOD GLUCOSE: CPT

## 2024-12-13 PROCEDURE — 83735 ASSAY OF MAGNESIUM: CPT | Performed by: INTERNAL MEDICINE

## 2024-12-13 PROCEDURE — 85025 COMPLETE CBC W/AUTO DIFF WBC: CPT | Performed by: STUDENT IN AN ORGANIZED HEALTH CARE EDUCATION/TRAINING PROGRAM

## 2024-12-13 RX ADMIN — LEVOTHYROXINE SODIUM 175 MCG: 0.03 TABLET ORAL at 05:36

## 2024-12-13 RX ADMIN — BUDESONIDE 0.5 MG: 0.5 INHALANT ORAL at 06:52

## 2024-12-13 RX ADMIN — BUDESONIDE 0.5 MG: 0.5 INHALANT ORAL at 18:57

## 2024-12-13 RX ADMIN — INSULIN LISPRO 2 UNITS: 100 INJECTION, SOLUTION INTRAVENOUS; SUBCUTANEOUS at 17:59

## 2024-12-13 RX ADMIN — SODIUM PHOSPHATE, MONOBASIC, MONOHYDRATE AND SODIUM PHOSPHATE, DIBASIC, ANHYDROUS 15 MMOL: 142; 276 INJECTION, SOLUTION INTRAVENOUS at 18:16

## 2024-12-13 RX ADMIN — Medication 10 ML: at 08:41

## 2024-12-13 RX ADMIN — MICAFUNGIN SODIUM 100 MG: 100 INJECTION, POWDER, LYOPHILIZED, FOR SOLUTION INTRAVENOUS at 09:21

## 2024-12-13 RX ADMIN — ARFORMOTEROL TARTRATE 15 MCG: 15 SOLUTION RESPIRATORY (INHALATION) at 18:57

## 2024-12-13 RX ADMIN — SODIUM PHOSPHATE, MONOBASIC, MONOHYDRATE AND SODIUM PHOSPHATE, DIBASIC, ANHYDROUS 15 MMOL: 142; 276 INJECTION, SOLUTION INTRAVENOUS at 15:30

## 2024-12-13 RX ADMIN — INSULIN LISPRO 4 UNITS: 100 INJECTION, SOLUTION INTRAVENOUS; SUBCUTANEOUS at 05:51

## 2024-12-13 RX ADMIN — WHITE PETROLATUM 1 APPLICATION: 1.75 OINTMENT TOPICAL at 09:22

## 2024-12-13 RX ADMIN — INSULIN LISPRO 4 UNITS: 100 INJECTION, SOLUTION INTRAVENOUS; SUBCUTANEOUS at 00:51

## 2024-12-13 RX ADMIN — ARFORMOTEROL TARTRATE 15 MCG: 15 SOLUTION RESPIRATORY (INHALATION) at 06:52

## 2024-12-13 RX ADMIN — SODIUM PHOSPHATE, MONOBASIC, MONOHYDRATE AND SODIUM PHOSPHATE, DIBASIC, ANHYDROUS 15 MMOL: 142; 276 INJECTION, SOLUTION INTRAVENOUS at 04:38

## 2024-12-13 RX ADMIN — Medication 10 ML: at 20:12

## 2024-12-13 RX ADMIN — INSULIN LISPRO 3 UNITS: 100 INJECTION, SOLUTION INTRAVENOUS; SUBCUTANEOUS at 23:42

## 2024-12-13 NOTE — PROGRESS NOTES
"Nutrition Services    Patient Name: Nelson Wang  YOB: 1945  MRN: [id number]  Admission date: 12/5/2024      CLINICAL NUTRITION ASSESSMENT      Reason for Assessment  Follow Up   H&P:  Past Medical History:   Diagnosis Date    Abnormal stress test     Anemia     IRON INFUSIONS WITH DIALYSIS    Anesthesia     WITH HIP REPLACEMENT DAUGHTER BELIEVES HAD SVT 2000    Ankle pain, right     Anxiety and depression     Arthritis     CKD (chronic kidney disease), stage IV     DIALYSIS [id number] SHILEY IN RIGHT CHEST    Diabetes     GERD (gastroesophageal reflux disease)     Gout     High cholesterol     History of peritoneal dialysis     HL (hearing loss)     Hyperkalemia     Hypertension     Hypothyroidism     Insomnia     Night terrors     Psoriasis     Psoriasis     Sleep apnea     Sleep walking     Thrombocytopenia     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    Vitiligo         Current Problems:   Active Hospital Problems    Diagnosis     **Hypoxia         Nutrition/Diet History         Narrative   Pt remains in ICU. Cotrak was coiled in mouth and pulled. Will need to be replaced. Plans for HD today and possible CT scan. Pt with 18# weight loss since admission. I/Os reveal -5.3L since admission which equals about 12#s. Once patient has enteral access, resume TF.      Anthropometrics        Current Height, Weight Height: 170.2 cm (67\")  Weight: 62.5 kg (137 lb 12.6 oz)   Current BMI Body mass index is 21.58 kg/m².   BMI Classification Normal range Healthy range for age 23-30   % IBW 95%   Adjusted Body Weight (ABW) N/A   Weight Hx  Wt Readings from Last 15 Encounters:   12/13/24 0407 62.5 kg (137 lb 12.6 oz)   12/12/24 0500 69.3 kg (152 lb 12.5 oz)   12/09/24 0600 70.2 kg (154 lb 12.2 oz)   12/06/24 1228 70.7 kg (155 lb 13.8 oz)   12/05/24 1850 75.3 kg (166 lb 0.1 oz)   12/01/24 1701 71 kg (156 lb 8.4 oz)   11/27/24 1247 71.6 kg (157 lb 12.8 oz)   11/22/24 1311 71.8 kg (158 lb 3.2 oz)   11/18/24 " 0351 67.9 kg (149 lb 11.1 oz)   11/17/24 0255 71.9 kg (158 lb 8.2 oz)   11/16/24 0316 67.9 kg (149 lb 11.1 oz)   11/14/24 0611 73.9 kg (162 lb 14.7 oz)   11/13/24 0500 73 kg (161 lb)   11/12/24 0523 73.2 kg (161 lb 6 oz)   11/11/24 1200 72.8 kg (160 lb 7.9 oz)   11/10/24 0500 74.4 kg (164 lb 0.4 oz)   11/08/24 0640 73 kg (160 lb 14.4 oz)   11/07/24 0621 72.7 kg (160 lb 3.2 oz)   11/05/24 0600 68.9 kg (151 lb 14.4 oz)   11/04/24 1402 67 kg (147 lb 11.3 oz)   11/04/24 0410 67.9 kg (149 lb 11.1 oz)   11/01/24 0159 74.3 kg (163 lb 12.8 oz)   10/27/24 0700 72.5 kg (159 lb 13.3 oz)   10/27/24 0346 75.7 kg (166 lb 14.2 oz)   10/09/24 1402 75.7 kg (166 lb 12.8 oz)   10/08/24 1455 75.3 kg (166 lb 0.1 oz)   10/02/24 0812 77.5 kg (170 lb 13.7 oz)   09/28/24 0330 77.2 kg (170 lb 3.1 oz)   09/27/24 1659 76.2 kg (167 lb 15.9 oz)   09/26/24 0823 78.9 kg (174 lb)   09/16/24 1814 79.3 kg (174 lb 13.2 oz)   09/12/24 1344 77.1 kg (169 lb 15.6 oz)   08/30/24 0927 75.6 kg (166 lb 10.7 oz)   08/14/24 0922 76.7 kg (169 lb)          Wt Change Observation I/O's reveal -5.3 L since admission (~12 #s)     Estimated/Assessed Needs  Estimated Needs based on: Current Body Weight 62.5 kg       Energy Requirements 25-30 kcal/kg   EST Needs (kcal/day) 0355-4821        Protein Requirements 1.2-1.5 g.kg   EST Daily Needs (g/day) 75-94       Fluid Requirements 1mL/kcal    Estimated Needs (mL/day) 2611-6121     Labs/Medications Phos low, receiving K phos repletion.         Pertinent Labs Reviewed.   Results from last 7 days   Lab Units 12/13/24  0319 12/12/24  0417 12/11/24  0611   SODIUM mmol/L 140 139 135*  135*   POTASSIUM mmol/L 4.1 4.1 3.9  3.9   CHLORIDE mmol/L 102 104 102  102   CO2 mmol/L 24.9 20.8* 19.3*  19.3*   BUN mg/dL 66* 38* 18  18   CREATININE mg/dL 5.19* 3.32* 1.55*  1.55*   CALCIUM mg/dL 10.4 10.2 9.7  9.7   BILIRUBIN mg/dL 1.0 1.4* 1.6*   ALK PHOS U/L 93 76 94   ALT (SGPT) U/L 20 23 36   AST (SGOT) U/L 36 39 61*   GLUCOSE  mg/dL 266* 172* 236*  236*     Results from last 7 days   Lab Units 12/13/24  0319 12/12/24  0417 12/11/24  0611   MAGNESIUM mg/dL 3.1* 2.8* 2.5*   PHOSPHORUS mg/dL 2.2* 2.4* 2.8   HEMOGLOBIN g/dL 11.5* 10.6* 11.4*   HEMATOCRIT % 39.2 35.8* 38.1     COVID19   Date Value Ref Range Status   10/08/2024 Not Detected Not Detected - Ref. Range Final     Lab Results   Component Value Date    HGBA1C 5.90 (H) 11/09/2024         Pertinent Medications Reviewed.     Malnutrition Severity Assessment              Nutrition Diagnosis         Nutrition Dx Problem 1 Inadequate oral Intake related to Inability to consume sufficient energy as evidenced by intubated/sedated, NPO.     Nutrition Intervention           Current Nutrition Orders & Evaluation of Intake       Current PO Diet NPO Diet NPO Type: Strict NPO   Supplement Orders Placed This Encounter      Tube Feeding: Formula: Novasource Renal; Feeding Type: Continuous; Start at: 20 mL/hr; Then Advance By: 10 mL/hr; Every: 8 hours; To Goal Rate of: 40 mL/hr; Water Flush: Other; Other: 65; Every: 4 hours; Water Bolus: None      Dietary Nutrition Supplements ProSource No Carb; Neutral      Feeding Tube Insertion - Cortrak System           Nutrition Intervention/Prescription        Novasource Renal @ 40 mL/hr, flush 65 mL q4h  Provides: 1760 kcal, 80 g pro, 1015 mL (625 mL FW + 390 mL flush)    Prosource No Carb BID provides additional 120 kcal, 30 g pro/day        Medical Nutrition Therapy/Nutrition Education          Learner     Readiness N/A  N/A     Method     Response N/A  N/A     Monitor/Evaluation        Monitor PO intake, Pertinent labs, EN delivery/tolerance, GI status, Hemodynamic stability     Nutrition Discharge Plan         To be determined     Electronically signed by:  Emma Collazo, MARLEEN  12/13/24 07:36 EST

## 2024-12-13 NOTE — PROGRESS NOTES
[facility]   Hospitalist Progress Note  Date: 2024  Patient Name: Nelson Wang  : 1945  MRN: [id number]  Date of admission: 2024  Room/Bed: \[facility]\""      Subjective   Subjective     Chief Complaint: Shortness of breath    Summary:    Nelson Wang is a 79 y.o. 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  Patient was started on supplemental oxygen to maintain his oxygen saturations.  He was given a dose of Bumex overnight.  His chest x-ray was consistent with bilateral pleural effusions.  Pulmonology was consulted for the bilateral pleural effusions and and nephrology consulted for possible volume overload.  Pulmonology was performing a thoracentesis this morning.  Thoracentesis was showing bloody fluid.  Patient suffered CODE BLUE. Likely bleeding into his airways causing hypoxic arrest.  He received CPR for 6 minutes and achieved ROSC.  He was transferred to the ICU, intubated and on blood pressure support.  Bronchoscopy was showing blood clots in his airways.  Patient was started on CRRT.  Patient's medical status is tenuous.  He is currently on 1 pressor. CRRT is removing fluid at a slow rate.  Patient started on micafungin on 2024.  Patient extubated on 2024.  Patient on high flow nasal cannula 30 L 25%.  Slowly weaning down on supplemental oxygen, breathing more comfortably.  Poor mental status still.  Patient has been transitioned over to hemodialysis.    Interval Followup:   Mental status still poor.  CT head completed today, discussed with family.  Patient's core track became dislodged, has been now replaced.  Able to discuss with family at bedside.  Biggest concern at this time is still patient's encephalopathy.  They understand patient will likely need PEG tube in the near future if his mental status does not  improve.  Hoping to see some steps of improvement in mental status before PEG tube is pursued.    Objective   Objective     Vitals:   Temp:  [97.7 °F (36.5 °C)-100.2 °F (37.9 °C)] 97.9 °F (36.6 °C)  Heart Rate:  [] 99  Resp:  [16-30] 20  BP: ()/() 106/51  Flow (L/min) (Oxygen Therapy):  [2] 2    Physical Exam   General: Moves spontaneously, not interacting appropriately, opening eyes spontaneously  Cardiovascular: RRR, no murmurs   Pulmonary: nasal cannula in place, coarse breath sounds  Gastrointestinal: Abdomen is soft  Musculoskeletal: No lower extremity edema  Neuro: Unable to assess mental status, spontaneously moving all 4 extremities    Result Review    Result Review:  I have personally reviewed these results:  [x]  Laboratory      Lab 12/13/24  0319 12/12/24 0417 12/11/24  0611 12/11/24  0431 12/10/24  0620 12/09/24  0622   WBC 8.98 7.97 9.57  --  5.77 5.23   HEMOGLOBIN 11.5* 10.6* 11.4*  --  9.1* 8.8*   HEMATOCRIT 39.2 35.8* 38.1  --  30.5* 29.5*   PLATELETS 182 146 148  --  124* 115*   NEUTROS ABS 5.25 4.56 5.88  6.12  --  3.57 3.82   IMMATURE GRANS (ABS) 0.15* 0.12* 0.25*  --  0.05 0.03   LYMPHS ABS 1.95 1.48 1.69  --  1.14 0.76   MONOS ABS 1.24* 1.50* 1.32*  --  0.71 0.55   EOS ABS 0.36 0.23 0.34  0.29  --  0.24 0.03   MCV 92.7 89.3 92.3  --  91.3 91.6   LACTATE  --   --   --  2.2* 1.3 1.1         Lab 12/13/24  1416 12/13/24 0319 12/12/24 0417 12/11/24  0611   SODIUM  --  140 139 135*  135*   POTASSIUM  --  4.1 4.1 3.9  3.9   CHLORIDE  --  102 104 102  102   CO2  --  24.9 20.8* 19.3*  19.3*   ANION GAP  --  13.1 14.2 13.7  13.7   BUN  --  66* 38* 18  18   CREATININE  --  5.19* 3.32* 1.55*  1.55*   EGFR  --  10.7* 18.3* 45.5*  45.5*   GLUCOSE  --  266* 172* 236*  236*   CALCIUM  --  10.4 10.2 9.7  9.7   MAGNESIUM  --  3.1* 2.8* 2.5*   PHOSPHORUS 1.6* 2.2* 2.4* 2.8         Lab 12/13/24  0319 12/12/24  0417 12/11/24  0611   TOTAL PROTEIN 8.0 7.9 9.0*   ALBUMIN 3.9 3.9 3.9   3.9   GLOBULIN 4.1 4.0 5.1   ALT (SGPT) 20 23 36   AST (SGOT) 36 39 61*   BILIRUBIN 1.0 1.4* 1.6*   ALK PHOS 93 76 94                         Lab 12/11/24  0431 12/08/24  0700 12/07/24  0912   PH, ARTERIAL 7.302* 7.309* 7.376   PCO2, ARTERIAL 45.6* 41.1 32.8*   PO2 ART 91.3 69.4* 91.0   O2 SATURATION ART 96.1 91.9* 97.0   FIO2 32 35 40   HCO3 ART 22.5 20.6* 19.2*   BASE EXCESS ART -4.0* -5.3* -5.3*     Brief Urine Lab Results  (Last result in the past 365 days)        Color   Clarity   Blood   Leuk Est   Nitrite   Protein   CREAT   Urine HCG        10/27/24 1417 Yellow   Cloudy   Negative   Trace   Negative   >=300 mg/dL (3+)                 [x]  Microbiology   Microbiology Results (last 10 days)       Procedure Component Value - Date/Time    Blood Culture - Blood, Blood, Central Line [[id number]]  (Normal) Collected: 12/07/24 1023    Lab Status: Final result Specimen: Blood, Central Line Updated: 12/12/24 1030     Blood Culture No growth at 5 days    Blood Culture - Blood, Blood, Arterial Line [[id number]]  (Normal) Collected: 12/07/24 1023    Lab Status: Final result Specimen: Blood, Arterial Line Updated: 12/12/24 1046     Blood Culture No growth at 5 days    Pneumonia Panel - Lavage, Lung, Right Lower Lobe [[id number]]  (Normal) Collected: 12/06/24 1719    Lab Status: Final result Specimen: Lavage from Lung, Right Lower Lobe Updated: 12/06/24 1859     Escherichia coli PCR Not Detected     Acinetobacter calcoaceticus-baumannii complex PCR Not Detected     Enterobacter cloacae PCR Not Detected     Klebsiella oxytoca PCR Not Detected     Klebsiella pneumoniae group PCR Not Detected     Klebsiella aerogenes PCR Not Detected     Moraxella catarrhalis PCR Not Detected     Proteus species PCR Not Detected     Pseudomonas aeroginosa PCR Not Detected     Serratia marcescens PCR Not Detected     Staphylococcus aureus PCR Not Detected     Streptococcus pyogenes PCR Not Detected     Haemophilus influenzae PCR Not Detected      Streptococcus agalactiae PCR Not Detected     Streptococcus pneumoniae PCR Not Detected     Chlamydophila pneumoniae PCR Not Detected     Legionella pneumophilia PCR Not Detected     Mycoplasma pneumo by PCR Not Detected     ADENOVIRUS, PCR Not Detected     CTX-M Gene N/A     IMP Gene N/A     KPC Gene N/A     mecA/C and MREJ Gene N/A     NDM Gene N/A     OXA-48-like Gene N/A     VIM Gene N/A     Coronavirus Not Detected     Human Metapneumovirus Not Detected     Human Rhinovirus/Enterovirus Not Detected     Influenza A PCR Not Detected     Influenza B PCR Not Detected     RSV, PCR Not Detected     Parainfluenza virus PCR Not Detected    BAL Culture, Quantitative - Lavage, Bronchus [[id number]] Collected: 12/06/24 1719    Lab Status: Final result Specimen: Lavage from Bronchus Updated: 12/08/24 1013     BAL Culture >100,000 CFU/mL Normal respiratory lashae. No S. aureus or Pseudomonas aeruginosa detected. Final report.     Gram Stain No organisms seen    Anaerobic Culture - Pleural Fluid, Pleural Cavity [[id number]]  (Normal) Collected: 12/06/24 1235    Lab Status: Final result Specimen: Pleural Fluid from Pleural Cavity Updated: 12/11/24 0657     Anaerobic Culture No anaerobes isolated at 5 days    AFB Culture - Body Fluid, Pleural Cavity [[id number]] Collected: 12/06/24 1233    Lab Status: Preliminary result Specimen: Body Fluid from Pleural Cavity Updated: 12/13/24 1245     AFB Culture No AFB isolated at 1 week     AFB Stain Few (2+) WBCs seen      No organisms seen      No acid fast bacilli seen on direct smear    Body Fluid Culture - Body Fluid, Pleural Cavity [[id number]] Collected: 12/06/24 1233    Lab Status: Final result Specimen: Body Fluid from Pleural Cavity Updated: 12/09/24 0952     Body Fluid Culture No growth at 3 days     Gram Stain Few (2+) WBCs seen      No organisms seen    Fungus Culture - Body Fluid, Pleural Cavity [[id number]] Collected: 12/06/24 1233    Lab Status: Preliminary result Specimen: Body  Fluid from Pleural Cavity Updated: 12/13/24 1245     Fungus Culture No fungus isolated at 1 week    Blood Culture - Blood, Arm, Left [[id number]]  (Abnormal)  (Susceptibility) Collected: 12/05/24 1755    Lab Status: Final result Specimen: Blood from Arm, Left Updated: 12/11/24 0729     Blood Culture Staphylococcus epidermidis     Isolated from Aerobic Bottle     Blood Culture Streptococcus mutans     Isolated from Anaerobic Bottle     Gram Stain Aerobic Bottle Gram positive cocci in clusters      Anaerobic Bottle Gram positive cocci in chains    Narrative:            Susceptibility        Staphylococcus epidermidis      RITU      Oxacillin Resistant      Vancomycin Susceptible                       Susceptibility        Streptococcus mutans      RITU      Ceftriaxone Susceptible      Penicillin G Susceptible      Vancomycin Susceptible                           Blood Culture - Blood, Arm, Left [[id number]]  (Abnormal) Collected: 12/05/24 1755    Lab Status: Final result Specimen: Blood from Arm, Left Updated: 12/11/24 0729     Blood Culture Staphylococcus epidermidis     Isolated from Aerobic and Anaerobic Bottles     Blood Culture Streptococcus mutans     Isolated from Anaerobic Bottle     Blood Culture Corynebacterium species     Isolated from Anaerobic Bottle     Gram Stain Aerobic Bottle Gram positive cocci in clusters      Anaerobic Bottle Gram positive cocci in chains and clusters    Narrative:      Refer to previous blood culture collected on 12/05/2024 1755 for MICs  Corynebacterium is probable contaminant requires clinical correlation, susceptibility not performed unless requested by physician.       Blood Culture ID, PCR - Blood, Arm, Left [[id number]]  (Abnormal) Collected: 12/05/24 1755    Lab Status: Final result Specimen: Blood from Arm, Left Updated: 12/06/24 1617     BCID, PCR Staph spp, not aureus or lugdunensis. Identification by BCID2 PCR.     BCID, PCR 2 Streptococcus spp, not A, B, or pneumoniae.  Identification by BCID2 PCR.     BOTTLE TYPE Aerobic Bottle    Narrative:      Staph Sp mecA/C    Blood Culture ID, PCR - Blood, Arm, Left [[id number]]  (Abnormal) Collected: 12/05/24 1755    Lab Status: Final result Specimen: Blood from Arm, Left Updated: 12/07/24 0943     BCID, PCR Staph spp, not aureus or lugdunensis. Identification by BCID2 PCR.     BCID, PCR 2 Streptococcus spp, not A, B, or pneumoniae. Identification by BCID2 PCR.     BOTTLE TYPE Anaerobic Bottle    Narrative:      S.epi with mecA/C    Blood Culture ID, PCR - Blood, Arm, Left [[id number]]  (Abnormal) Collected: 12/05/24 1755    Lab Status: Final result Specimen: Blood from Arm, Left Updated: 12/07/24 0913     BCID, PCR Streptococcus spp, not A, B, or pneumoniae. Identification by BCID2 PCR.     BCID, PCR 2 Candida glabrata. Identification by BCID2 PCR.     BOTTLE TYPE Anaerobic Bottle    Narrative:      Infectious disease consultation is highly recommended to rule out distant foci of infection.          [x]  Radiology  CT Head Without Contrast    Result Date: 12/13/2024  Impression: No acute intracranial process. Improving right posterior scalp hematoma. Electronically Signed: Margaret Hammond MD  12/13/2024 9:05 AM EST  Workstation ID: [id number]    XR Chest 1 View    Result Date: 12/13/2024  Impression: Removed feeding tube, otherwise no significant radiographic change since 12/11/2024. Electronically Signed: Lorne Bustamante MD  12/13/2024 8:06 AM EST  Workstation ID: [id number]    XR Abdomen KUB    Result Date: 12/11/2024  Impression: 1.Lines and tubes as above. 2.Indeterminate bowel gas pattern. 3.Subcutaneous emphysema overlies the right lower chest. Bilateral pleural effusions. Electronically Signed: Carlos Joiner MD  12/11/2024 7:27 AM EST  Workstation ID: [id number]    XR Chest 1 View    Result Date: 12/11/2024  1.Interval removal of right chest tube without evidence of pneumothorax. 2.Persistent pleural effusions with bibasilar  consolidations, right worse than left. 3.Subcutaneous emphysema. Electronically Signed: Cristian Tiwari MD  12/11/2024 5:15 AM EST  Workstation ID: [id number]    XR Chest 1 View    Result Date: 12/10/2024  1.No evidence of pneumothorax. 2.Improved aeration at the left lung base. 3.Persistent infiltrate at the right lung base. 4.Subcutaneous emphysema. Electronically Signed: Cristian Tiwari MD  12/10/2024 2:10 AM EST  Workstation ID: [id number]    XR Chest 1 View    Result Date: 12/9/2024  Impression: 1. No change in subcutaneous emphysema. 2. Slight improvement in patchy bilateral airspace disease. 3. No change in right thoracostomy tube. No definite pneumothorax identified. Electronically Signed: Stephan Dey MD  12/9/2024 5:18 PM EST  Workstation ID: [id number]    XR Chest 1 View    Result Date: 12/9/2024  Impression: 1.Interval extubation. 2.Right-sided chest tube in unchanged position. No definite pneumothorax. 3.Patchy right greater than left airspace disease, similar to the prior examination. 4.Subcutaneous emphysema throughout the chest, right greater than left. This has mildly improved from the prior exam. Electronically Signed: Reynaldo Rangel  12/9/2024 9:35 AM EST  Workstation ID: [id number]    XR Chest 1 View    Result Date: 12/8/2024  1.No significant interval change. 2.Right-sided chest tube without evidence of pneumothorax. 3.Bilateral infiltrates, right worse than left. Electronically Signed: Cristian Tiwari MD  12/8/2024 6:11 AM EST  Workstation ID: [id number]    XR Chest 1 View    Result Date: 12/7/2024  1.Right chest tube has been pulled back slightly, but the side ports appear to be within the pleural space. No definite pneumothorax. 2.Extensive subcutaneous gas in the chest wall. 3.Stable bilateral infiltrates. Electronically Signed: Cristian Tiwari MD  12/7/2024 8:31 PM EST  Workstation ID: [id number]    XR Abdomen KUB    Result Date: 12/7/2024  Impression: Weighted tip feeding catheter projects over the  stomach. Electronically Signed: Claus Sánchez MD  12/7/2024 1:08 PM EST  Workstation ID: [id number]    XR Chest 1 View    Result Date: 12/7/2024  Impression: 1.Endotracheal tube in good position. 2.Right-sided chest tube in place. No pneumothorax identified. 3.Diffuse bilateral subcutaneous emphysema appears slightly improved. 4.Patchy airspace disease throughout right lung appears unchanged. Electronically Signed: Winston Reyes MD  12/7/2024 9:25 AM EST  Workstation ID: [id number]    XR Chest 1 View    Result Date: 12/6/2024  Impression: 1.Endotracheal tube tip is 3 cm above the mel. 2.Slight change in position of the right-sided chest tube with increased subcutaneous emphysema tracking throughout both sides of the chest, right worse than left. Evaluation for pneumothorax is limited due to the subcutaneous emphysema. 3.Decreased right-sided airspace disease compared to prior exam. Electronically Signed: Reynaldo Rangel  12/6/2024 4:35 PM EST  Workstation ID: [id number]    XR Chest 1 View    Result Date: 12/6/2024  Impression: 1.Interval retraction of the endotracheal tube which now appears in more appropriate position overlying the mid thoracic trachea. 2.Mildly displaced right lateral seventh rib fracture. There are also mildly displaced left posterior second and third rib fractures. These are new from prior same day radiographs. 3.Persistent multifocal airspace opacities throughout the right lung with right greater than left pleural effusions. 4.Mild interval retraction of the right approach thoracostomy tube. Trace right apical pneumothorax. Increased right lateral chest wall emphysema. Electronically Signed: Devante Lang MD  12/6/2024 2:26 PM EST  Workstation ID: [id number]    XR Chest 1 View    Result Date: 12/6/2024  Impression: 1. Proximal right mainstem intubation. Retraction by 3.5 cm may place the ET tube in a more standard position. 2. Right thoracotomy tube without visible pneumothorax. Small volume right  chest wall subcutaneous emphysema. 3. Multifocal airspace disease and/or atelectasis worsened in the right middle and upper lobes. Based on acuity this could reflect aspiration, hemorrhage or edema. 4. Right greater than left pleural effusions similar to may be slightly improved allowing for technical differences to prior chest radiograph. Findings communicated with ordering provider Vamshi Villafana via [employer] secure chat, message viewed at 12:13 p.m. 12/6/2024. Electronically Signed: Lorne Bustamante MD  12/6/2024 12:23 PM EST  Workstation ID: [id number]    XR Chest 1 View    Result Date: 12/6/2024  Impression: 1.Decreased right-sided pleural effusion status post thoracentesis. No pneumothorax. 2.Similar bilateral airspace opacities and small left pleural effusion. Electronically Signed: Devante Lang MD  12/6/2024 11:30 AM EST  Workstation ID: [id number]    CT Chest Without Contrast Diagnostic    Result Date: 12/6/2024  Impression: 1.Moderate right and small left pleural effusions with bibasilar consolidations, which could represent atelectasis, aspiration, or pneumonia. 2.Interlobular septal thickening bilaterally, suggesting mild pulmonary edema. 3.Cardiomegaly. 4.Erosive changes at T5-6 appear similar, suggesting ongoing discitis/osteomyelitis. Electronically Signed: Winston Reyes MD  12/6/2024 8:44 AM EST  Workstation ID: [id number]   []  EKG/Telemetry   []  Cardiology/Vascular   []  Pathology  []  Old records  []  Other:    Assessment & Plan   Assessment / Plan     Status postcardiac arrest, secondary to hypoxia  Acute hypoxic/hypercapnic respiratory failure requiring mechanical ventilation  Hemoptysis  Altered mental status, concern for seizures  End-stage renal disease on dialysis  Acute on chronic diastolic heart failure  Staph epidermidis cystitis on vancomycin  Type 2 diabetes  Atrial fibrillation on Eliquis  Candidemia    Plan:  Patient remains admitted to the ICU for further care and management  Pulmonary  critical care, nephrology consulted, appreciate assistance  For concerns of seizure-like activity, started empirically on Keppra.  EEG returned with no seizure-like activity.  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  Discussed with intensivist who discussed with ID stewardship.  Would like CT abdomen and pelvis as well as MRI of spine.  However given his respiratory status, do not feel he would tolerate being in scanner.  Continue to monitor daily  Patient's blood cultures from 12/7/2024 remain no growth to date, x5 days  Continue NIPPV as tolerated  Replacing core track today.  Having discussions with family at bedside about timing of PEG tube.  They wish to see some improvement in mental status before making a decision like PEG tube.  For continued poor mental status, possibility of hospice discussions    Discussed with RN.  Discussed with intensivist, family at bedside    VTE Prophylaxis:  Pharmacologic & mechanical VTE prophylaxis orders are present.        CODE STATUS:   Level Of Support Discussed With: Next of Kin (If No Surrogate)  Code Status (Patient has no pulse and is not breathing): No CPR (Do Not Attempt to Resuscitate)  Medical Interventions (Patient has pulse or is breathing): Full Support

## 2024-12-13 NOTE — SIGNIFICANT NOTE
12/13/24 1000   Physical Therapy Time and Intention   Session Not Performed other (see comments)  (Hold, not following commands)

## 2024-12-13 NOTE — NURSING NOTE
Pt completed 4 hour dialysis treatment. 84.1 BLP and 300mL removed.    Pt had frequent periods of hypotension when attempting to remove fluid. Attempted to decrease BFR, decrease dialysate temperature, and pause fluid removal at different intervals. Ultimately was unsuccessful. Verbal order from Dr. Castro to stop fluid removal and to only filter blood.    Pt tolerated blood filtration without difficulty.    Pt was unable to follow commands while this RN was in the room and did not respond to voice. Pt did groan some when dialysis was started and needles placed.    Pt lung sounds coarse with crackles and diminished. Pt breathing shallow.    No edema noted to BLE.     Pt tolerated tx well and fistula worked without difficulty.     Report was given to Latha MARADIAGA and family updated on plan of care.    All questions addressed at this time.

## 2024-12-13 NOTE — PROGRESS NOTES
[facility] Clinical Pharmacy Services: Vancomycin Monitoring Note    Nelson Wang is a 79 y.o. male who is on day 42/42 of pharmacy to dose vancomycin for Bacteremia, Bone and/or Joint Infection, and Empiric.    Previous Vancomycin Dose: 750mg IV q12h   Imaging Reviewed?: Yes  Updated Cultures and Sensitivities:   Microbiology Results (last 10 days)       Procedure Component Value - Date/Time    Blood Culture - Blood, Blood, Central Line [[id number]]  (Normal) Collected: 12/07/24 1023    Lab Status: Final result Specimen: Blood, Central Line Updated: 12/12/24 1030     Blood Culture No growth at 5 days    Blood Culture - Blood, Blood, Arterial Line [[id number]]  (Normal) Collected: 12/07/24 1023    Lab Status: Final result Specimen: Blood, Arterial Line Updated: 12/12/24 1046     Blood Culture No growth at 5 days    Pneumonia Panel - Lavage, Lung, Right Lower Lobe [[id number]]  (Normal) Collected: 12/06/24 1719    Lab Status: Final result Specimen: Lavage from Lung, Right Lower Lobe Updated: 12/06/24 1859     Escherichia coli PCR Not Detected     Acinetobacter calcoaceticus-baumannii complex PCR Not Detected     Enterobacter cloacae PCR Not Detected     Klebsiella oxytoca PCR Not Detected     Klebsiella pneumoniae group PCR Not Detected     Klebsiella aerogenes PCR Not Detected     Moraxella catarrhalis PCR Not Detected     Proteus species PCR Not Detected     Pseudomonas aeroginosa PCR Not Detected     Serratia marcescens PCR Not Detected     Staphylococcus aureus PCR Not Detected     Streptococcus pyogenes PCR Not Detected     Haemophilus influenzae PCR Not Detected     Streptococcus agalactiae PCR Not Detected     Streptococcus pneumoniae PCR Not Detected     Chlamydophila pneumoniae PCR Not Detected     Legionella pneumophilia PCR Not Detected     Mycoplasma pneumo by PCR Not Detected     ADENOVIRUS, PCR Not Detected     CTX-M Gene N/A     IMP Gene N/A     KPC Gene N/A     mecA/C and MREJ Gene N/A     NDM Gene  N/A     OXA-48-like Gene N/A     VIM Gene N/A     Coronavirus Not Detected     Human Metapneumovirus Not Detected     Human Rhinovirus/Enterovirus Not Detected     Influenza A PCR Not Detected     Influenza B PCR Not Detected     RSV, PCR Not Detected     Parainfluenza virus PCR Not Detected    BAL Culture, Quantitative - Lavage, Bronchus [[id number]] Collected: 12/06/24 1719    Lab Status: Final result Specimen: Lavage from Bronchus Updated: 12/08/24 1013     BAL Culture >100,000 CFU/mL Normal respiratory lashae. No S. aureus or Pseudomonas aeruginosa detected. Final report.     Gram Stain No organisms seen    Anaerobic Culture - Pleural Fluid, Pleural Cavity [[id number]]  (Normal) Collected: 12/06/24 1235    Lab Status: Final result Specimen: Pleural Fluid from Pleural Cavity Updated: 12/11/24 0657     Anaerobic Culture No anaerobes isolated at 5 days    AFB Culture - Body Fluid, Pleural Cavity [[id number]] Collected: 12/06/24 1233    Lab Status: Preliminary result Specimen: Body Fluid from Pleural Cavity Updated: 12/11/24 1245     AFB Culture No AFB isolated at less than 1 week     AFB Stain Few (2+) WBCs seen      No organisms seen      No acid fast bacilli seen on direct smear    Body Fluid Culture - Body Fluid, Pleural Cavity [[id number]] Collected: 12/06/24 1233    Lab Status: Final result Specimen: Body Fluid from Pleural Cavity Updated: 12/09/24 0952     Body Fluid Culture No growth at 3 days     Gram Stain Few (2+) WBCs seen      No organisms seen    Fungus Culture - Body Fluid, Pleural Cavity [[id number]] Collected: 12/06/24 1233    Lab Status: Preliminary result Specimen: Body Fluid from Pleural Cavity Updated: 12/11/24 1246     Fungus Culture No fungus isolated at less than 1 week    Blood Culture - Blood, Arm, Left [[id number]]  (Abnormal)  (Susceptibility) Collected: 12/05/24 6318    Lab Status: Final result Specimen: Blood from Arm, Left Updated: 12/11/24 0738     Blood Culture Staphylococcus epidermidis      Isolated from Aerobic Bottle     Blood Culture Streptococcus mutans     Isolated from Anaerobic Bottle     Gram Stain Aerobic Bottle Gram positive cocci in clusters      Anaerobic Bottle Gram positive cocci in chains    Narrative:            Susceptibility        Staphylococcus epidermidis      RITU      Oxacillin >=4 ug/ml Resistant      Vancomycin 1 ug/ml Susceptible                       Susceptibility        Streptococcus mutans      RITU      Ceftriaxone <=0.12 ug/ml Susceptible      Penicillin G <=0.06 ug/ml Susceptible      Vancomycin 1 ug/ml Susceptible                           Blood Culture - Blood, Arm, Left [[id number]]  (Abnormal) Collected: 12/05/24 1755    Lab Status: Final result Specimen: Blood from Arm, Left Updated: 12/11/24 0729     Blood Culture Staphylococcus epidermidis     Isolated from Aerobic and Anaerobic Bottles     Blood Culture Streptococcus mutans     Isolated from Anaerobic Bottle     Blood Culture Corynebacterium species     Isolated from Anaerobic Bottle     Gram Stain Aerobic Bottle Gram positive cocci in clusters      Anaerobic Bottle Gram positive cocci in chains and clusters    Narrative:      Refer to previous blood culture collected on 12/05/2024 1755 for MICs  Corynebacterium is probable contaminant requires clinical correlation, susceptibility not performed unless requested by physician.       Blood Culture ID, PCR - Blood, Arm, Left [[id number]]  (Abnormal) Collected: 12/05/24 1755    Lab Status: Final result Specimen: Blood from Arm, Left Updated: 12/06/24 1617     BCID, PCR Staph spp, not aureus or lugdunensis. Identification by BCID2 PCR.     BCID, PCR 2 Streptococcus spp, not A, B, or pneumoniae. Identification by BCID2 PCR.     BOTTLE TYPE Aerobic Bottle    Narrative:      Staph Sp mecA/C    Blood Culture ID, PCR - Blood, Arm, Left [[id number]]  (Abnormal) Collected: 12/05/24 1755    Lab Status: Final result Specimen: Blood from Arm, Left Updated: 12/07/24 0943     BCID,  "PCR Staph spp, not aureus or lugdunensis. Identification by BCID2 PCR.     BCID, PCR 2 Streptococcus spp, not A, B, or pneumoniae. Identification by BCID2 PCR.     BOTTLE TYPE Anaerobic Bottle    Narrative:      S.epi with mecA/C    Blood Culture ID, PCR - Blood, Arm, Left [[id number]]  (Abnormal) Collected: 24    Lab Status: Final result Specimen: Blood from Arm, Left Updated: 24     BCID, PCR Streptococcus spp, not A, B, or pneumoniae. Identification by BCID2 PCR.     BCID, PCR 2 Candida glabrata. Identification by BCID2 PCR.     BOTTLE TYPE Anaerobic Bottle    Narrative:      Infectious disease consultation is highly recommended to rule out distant foci of infection.              Vitals/Labs  Ht: 170.2 cm (67\"); Wt: 62.5 kg (137 lb 12.6 oz)   Temp (24hrs), Av.4 °F (36.9 °C), Min:97.5 °F (36.4 °C), Max:100.2 °F (37.9 °C)   Estimated Creatinine Clearance: 10.4 mL/min (A) (by C-G formula based on SCr of 5.19 mg/dL (H)).   Hemodialysis, schedule to be determined, per pulm and nursing plan is to restart 2024    Results from last 7 days   Lab Units 24  0319 24  0417 24  0611 24  1830 24  1522 24  1220 24  0622 24  0410 24  2346 24  1154 24  0606   VANCOMYCIN RM mcg/mL 28.73 30.30 27.18  --   --   --  18.24  --  23.90  --  14.16   VANCOMYCIN TR mcg/mL  --   --   --   --  17.48  --   --   --   --   --   --    CREATININE mg/dL 5.19* 3.32* 1.55*  1.55*   < >  --    < > 2.16*   < > 1.39*   < > 1.81*  1.81*   WBC 10*3/mm3 8.98 7.97 9.57   < >  --   --  5.23  --  6.95  --  6.28    < > = values in this interval not displayed.     Assessment/Plan    Current Vancomycin Dose: Pulse dose s/t dialysis  HD scheduled for today.   Vancomycin random this morning was 28.73. No vancomycin today. Pt finishes course of vancomycin today.   We will continue to monitor patient changes and renal function     Thank you for involving pharmacy in " this patient's care. Please contact pharmacy with any questions or concerns.    Xochilt Neff [facility]  Clinical Pharmacist

## 2024-12-13 NOTE — SIGNIFICANT NOTE
Wound Eval / Progress Noted     Nate     Patient Name: Nelson Wang  : 1945  MRN: [id number]  Today's Date: 2024                 Admit Date: 2024    Visit Dx:    ICD-10-CM ICD-9-CM   1. Acute respiratory failure with hypoxia  J96.01 518.81   2. Acute pulmonary edema  J81.0 518.4         Hypoxia        Past Medical History:   Diagnosis Date    Abnormal stress test     Anemia     IRON INFUSIONS WITH DIALYSIS    Anesthesia     WITH HIP REPLACEMENT DAUGHTER BELIEVES HAD SVT     Ankle pain, right     Anxiety and depression     Arthritis     CKD (chronic kidney disease), stage IV     DIALYSIS [id number] SHILEY IN RIGHT CHEST    Diabetes     GERD (gastroesophageal reflux disease)     Gout     High cholesterol     History of peritoneal dialysis     HL (hearing loss)     Hyperkalemia     Hypertension     Hypothyroidism     Insomnia     Night terrors     Psoriasis     Psoriasis     Sleep apnea     Sleep walking     Thrombocytopenia     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    Vitiligo         Past Surgical History:   Procedure Laterality Date    ABDOMINAL SURGERY      ANKLE SURGERY Right 1990    APPENDECTOMY N/A     ARTERIOVENOUS FISTULA/SHUNT SURGERY Left 2024    Procedure: Creation of left arm arteriovenous fistula;  Surgeon: Moses Mir MD;  Location: [facility] MAIN OR;  Service: Vascular;  Laterality: Left;    BRONCHOSCOPY N/A 2024    Procedure: BRONCHOSCOPY WITH BAL AND WASHINGS;  Surgeon: Sandra Espinal MD;  Location: [facility] ENDOSCOPY;  Service: Pulmonary;  Laterality: N/A;  PNEUMONIA    BRONCHOSCOPY N/A 2024    Procedure: BRONCHOSCOPY WITH BALS AND WASHINGS;  Surgeon: Sandra Espinal MD;  Location: [facility] ENDOSCOPY;  Service: Pulmonary;  Laterality: N/A;  MUCOUS PLUGGING    CARDIAC CATHETERIZATION N/A 2024    Procedure: Left Heart Cath with possible coronary angioplasty;  Surgeon: Stephan Nichols MD;  Location: [facility] CATH INVASIVE LOCATION;   Service: Cardiology;  Laterality: N/A;    COLONOSCOPY N/A 06/2022    [facility]    ENDOSCOPY N/A 10/28/2024    Procedure: ESOPHAGOGASTRODUODENOSCOPY INSERTION OF LIGHTED INSTRUMENT TO VIEW ESOPHAGUS, STOMACH AND SMALL INTESTINE;  Surgeon: Kingsley Peraza MD;  Location: [facility] ENDOSCOPY;  Service: Gastroenterology;  Laterality: N/A;  Gastritis    FRACTURE SURGERY      HIP BIPOLAR REPLACEMENT Right 01/2000    INSERTION HEMODIALYSIS CATHETER Left 04/09/2024    Procedure: HEMODIALYSIS CATHETER INSERTION;  Surgeon: Jose Berry MD;  Location: [facility] OR;  Service: General;  Laterality: Left;    INSERTION HEMODIALYSIS CATHETER N/A 04/12/2024    Procedure: Tunneled hemodialysis catheter insertion;  Surgeon: Enrique Vinson MD;  Location: [facility] OR;  Service: Vascular;  Laterality: N/A;    INSERTION PERITONEAL DIALYSIS CATHETER N/A 03/27/2023    Procedure: LAPAROSCOPIC INSERTION PERITONEAL DIALYSIS CATHETER, LAPAROSCOPIC OMENTOPEXY WITH LYSIS OF ADHESIONS;  Surgeon: Jose Berry MD;  Location: [facility] OR;  Service: General;  Laterality: N/A;    INSERTION PERITONEAL DIALYSIS CATHETER Left 07/23/2023    Procedure: REVISION OF PERITONEAL DIALYSIS CATHETER;  Surgeon: Radha Oreilly MD;  Location: [facility] OR;  Service: General;  Laterality: Left;    JOINT REPLACEMENT      REMOVAL PERITONEAL DIALYSIS CATHETER N/A 04/09/2024    Procedure: REMOVAL PERITONEAL DIALYSIS CATHETER;  Surgeon: Jose Berry MD;  Location: [facility] OR;  Service: General;  Laterality: N/A;    RENAL BIOPSY Left 07/15/2022    UPPER GASTROINTESTINAL ENDOSCOPY      VASCULAR SURGERY      WRIST SURGERY      UNSURE WHICH SIDE DAUGHTER REPORTS HAD SEVERE  CUT FROM WINDOW AND THEY HAD TO DO RECONSTRUCTIVE SURGERY WITH VESSELS AND NERVES         Physical Assessment:  Wound 12/05/24 2331 Right lower leg abrasion (Active)   Wound Image   12/13/24 0918   Dressing Appearance dry;intact 12/13/24 0918    Closure None 12/13/24 0918   Base dry;red;scab 12/13/24 0918   Periwound dry;intact 12/13/24 0918   Periwound Temperature warm 12/13/24 0918   Periwound Skin Turgor soft 12/13/24 0918   Edges rolled/closed 12/13/24 0918   Drainage Amount none 12/13/24 0918   Care, Wound cleansed with;sterile normal saline 12/13/24 0918   Dressing Care dressing applied;petroleum-based;non-adherent;gauze;silicone border foam 12/13/24 0918   Periwound Care absorptive dressing applied 12/13/24 0918       Wound 12/06/24 1440 Left lower leg skin tear (Active)   Wound Image   12/13/24 0918   Dressing Appearance dry;intact 12/13/24 0918   Closure None 12/13/24 0918   Base dry;red;scab 12/13/24 0918   Periwound dry;intact 12/13/24 0918   Periwound Temperature warm 12/13/24 0918   Periwound Skin Turgor soft 12/13/24 0918   Edges rolled/closed 12/13/24 0918   Drainage Amount none 12/13/24 0918   Care, Wound cleansed with;sterile normal saline 12/13/24 0918   Dressing Care dressing applied;petroleum-based;non-adherent;gauze;silicone border foam 12/13/24 0918   Periwound Care absorptive dressing applied 12/13/24 0918      Wound Check / Follow-up: Patient seen today for wound follow-up.  Patient remains in SICU.  Family present at bedside.    Abrasion to left knee has resolved at this time with complete closure.  Dry, pink tissue is present.  Resolving abrasions remain to left anterior lower leg and right anterior lower leg.  Dry, red crusted tissue remains.  Periwound tissue is dry and intact.  Cleansed with normal saline and gauze.  Applied a non-adherent petroleum-based gauze then secured with silicone border dressings.  Recommending to continue daily dressing changes.      Impression: Resolving abrasions.      Short term goals: Regain skin integrity, skin protection, daily dressing changes.      Mary Guillen RN    12/13/2024    15:19 EST

## 2024-12-13 NOTE — PROGRESS NOTES
[facility]     Progress Note    Patient Name: Nelson Wang  : 1945  MRN: [id number]  Primary Care Physician:  Domingo Bravo MD  Date of admission: 2024    Subjective   Subjective     Chief Complaint:   Patient is critically ill status post cardiac arrest with hemoptysis, hypoxic respiratory failure, likely hypoxic arrest, with acute hypoxic respiratory failure, acute on chronic congestive diastolic heart failure with EF of 50 to 60%, grade 1 diastolic dysfunction, acute cardiogenic pulmonary edema loculated right-sided pleural effusion, ESRD on hemodialysis, GASTON with home CPAP.    2 L of oxygen  Mental status unchanged.  He does grimace to pain but not following commands  Appears very encephalopathic  Head CT today with no obvious acute pathology  EEG with no seizures.  Did have some slowing consistent with encephalopathy  Being dialyzed today  Currently not on any pressors  Cortrak coiled in mouth and was pulled overnight.  Will replace today  Remains on vancomycin and micafungin  Repeat blood cultures negative  Needing frequent suctioning but responds well with strong cough      Objective   Objective     Vitals:   Temp:  [97.5 °F (36.4 °C)-100.2 °F (37.9 °C)] 97.7 °F (36.5 °C)  Heart Rate:  [] 102  Resp:  [16-30] 23  BP: ()/(37-67) 114/61  Flow (L/min) (Oxygen Therapy):  [2] 2    Physical Exam   Vital Signs Reviewed  Critically ill, more awake but still confused, somewhat better than previous days  HEENT:  PERRL.  OP clear  Chest:  poor aeration, diminished breath sounds, resonant to percussion b/l, no increased work of breathing noted, decreasing subcutaneous emphysema right chest  CV: RRR, no MGR, pulses 2+, equal  Abd:  Soft, NT, ND, + BS, no HSM  EXT:  no clubbing, no cyanosis, No BLE edema, left arm AV fistula in place  Neuro: Grimaces to pain moves all extremities, cranial nerves intact, does open eyes to name but very confused  skin: No rashes or lesions noted      Result  Review    Result Review:  I have personally reviewed the results from the time of this admission to 12/13/2024 11:38 EST and agree with these findings:  [x]  Laboratory  [x]  Microbiology  [x]  Radiology  [x]  EKG/Telemetry   [x]  Cardiology/Vascular   []  Pathology  []  Old records  []  Other:  Most notable findings include:       Lab 12/13/24  0319 12/12/24  0417 12/11/24  0611 12/11/24  0431 12/11/24  0008 12/10/24  1814 12/10/24  1221 12/10/24  0620 12/09/24  1220 12/09/24  0622 12/08/24  0410 12/07/24  2346 12/07/24  1154 12/07/24  0606   WBC 8.98 7.97 9.57  --   --   --   --  5.77  --  5.23  --  6.95  --  6.28   HEMOGLOBIN 11.5* 10.6* 11.4*  --   --   --   --  9.1*  --  8.8*  --  9.3*  --  9.3*   HEMATOCRIT 39.2 35.8* 38.1  --   --   --   --  30.5*  --  29.5*  --  30.7*  --  30.8*   PLATELETS 182 146 148  --   --   --   --  124*  --  115*  --  143  --  109*   SODIUM 140 139 135*  135*  --  137 138 138 136   < > 136   < > 136   < > 136  136   POTASSIUM 4.1 4.1 3.9  3.9  --  3.8 3.9 3.9 4.1   < > 4.4   < > 4.6   < > 4.4  4.4   CHLORIDE 102 104 102  102  --  105 105 108* 103   < > 102   < > 104   < > 103  103   CO2 24.9 20.8* 19.3*  19.3*  --  21.5* 22.7 21.5* 20.3*   < > 20.9*   < > 19.7*   < > 22.1  22.1   BUN 66* 38* 18  18  --  17 16 17 17   < > 20   < > 10   < > 9  9   CREATININE 5.19* 3.32* 1.55*  1.55* 1.49* 1.61* 1.73* 1.68* 1.75*   < > 2.16*   < > 1.39*   < > 1.81*  1.81*   GLUCOSE 266* 172* 236*  236*  --  220* 181* 148* 188*   < > 136*   < > 138*   < > 75  75   CALCIUM 10.4 10.2 9.7  9.7  --  9.2 8.9 8.8 8.6   < > 9.1   < > 8.3*   < > 8.3*  8.3*   PHOSPHORUS 2.2* 2.4* 2.8  --  2.4* 2.6 2.6 2.7   < > 4.0   < > 4.4   < > 3.5   TOTAL PROTEIN 8.0 7.9 9.0*  --   --   --   --   --   --   --   --   --   --  6.4   ALBUMIN 3.9 3.9 3.9  3.9  --  3.5 3.3* 3.0* 3.1*   < > 3.2*   < > 2.8*   < > 2.8*  2.8*   GLOBULIN 4.1 4.0 5.1  --   --   --   --   --   --   --   --   --   --  3.6    < > = values  in this interval not displayed.     XR Chest 1 View    Result Date: 12/13/2024  XR CHEST 1 VW Date of Exam: 12/13/2024 7:47 AM EST Indication: Coretrak placement Comparison: 12/11/2024 chest radiograph Findings: Feeding tube removed. Lung volumes are low. Cardiomediastinal silhouette stable. Aortic atherosclerotic disease. Blunted costophrenic angles suspicious for small effusions. Nonspecific bibasilar opacities which could reflect atelectasis or airspace disease similar to previous comparison. There is right greater than left chest wall subcutaneous emphysema similar to prior. No visualized pneumothorax. Degenerative related osseous change.     Impression: Impression: Removed feeding tube, otherwise no significant radiographic change since 12/11/2024. Electronically Signed: Lorne Bustamante MD  12/13/2024 8:06 AM EST  Workstation ID: [id number]    XR Chest 1 View    Result Date: 12/11/2024  XR CHEST 1 VW Date of Exam: 12/11/2024 4:45 AM EST Indication: Decreased oxygen saturation Comparison: 12/10/2024 Findings: Heart size is stable. Feeding tube tip is not seen but it is below the diaphragm. Right chest tube has been removed. No pneumothorax on either side of the chest. There is a persistent pleural effusion with bibasilar consolidations, right worse than left.  Subcutaneous emphysema is noted on both sides of the chest.     Impression: 1.Interval removal of right chest tube without evidence of pneumothorax. 2.Persistent pleural effusions with bibasilar consolidations, right worse than left. 3.Subcutaneous emphysema. Electronically Signed: Cristian Tiwari MD  12/11/2024 5:15 AM EST  Workstation ID: [id number]     Results for orders placed during the hospital encounter of 12/05/24    Adult Transthoracic Echo Complete W/ Cont if Necessary Per Protocol    Interpretation Summary    The quality of the study is limited due to limited views obtained    Left ventricular systolic function is normal. Calculated left ventricular  EF = 57.8%    The right ventricular cavity is mildly dilated.    The left atrial cavity is mildly dilated.    There is mild calcification of the aortic valve no evidence of vegetation seen    Blood cultures with Candida and Staph epidermidis  Repeat blood cultures negative    EEG with no seizures.  Did have some slowing consistent with encephalopathy    CT Head Without Contrast    Result Date: 12/13/2024  CT HEAD WO CONTRAST Date of Exam: 12/13/2024 8:44 AM EST Indication: ams not improving. Comparison: CT head 12/1/2024 Technique: Axial CT images were obtained of the head without contrast administration.  Reconstructed coronal and sagittal images were also obtained. Automated exposure control and iterative construction methods were used. Findings: There is no evidence of acute infarction. There there is no acute intracranial hemorrhage. There are no extra-axial collections. Ventricles and CSF spaces are symmetric. No mass effect nor hydrocephalus. Some periventricular white matter changes are noted. There is cerebral atrophy which appears age-related.  Some fluid opacification of the right mastoid air cells is noted.  Right posterior scalp hematoma shows some interval decrease in size. Osseous structures and orbits appear intact.     Impression: Impression: No acute intracranial process. Improving right posterior scalp hematoma. Electronically Signed: Margaret Hammond MD  12/13/2024 9:05 AM EST  Workstation ID: [id number]    CT Head Without Contrast    Result Date: 12/1/2024  CT HEAD WO CONTRAST Date of Exam: 12/1/2024 6:17 PM EST Indication: Head injury headache. Comparison: MRI 11/15/2024 Technique: Axial CT images were obtained of the head without contrast administration.  Reconstructed coronal and sagittal images were also obtained. Automated exposure control and iterative construction methods were used. Findings: There is mild generalized parenchymal volume loss. There is no acute infarct or hemorrhage. No  abnormal extra-axial fluid collection is identified. There is no mass effect or hydrocephalus. There is a small scalp hematoma overlying the right parietal bone at the vertex. This measures 1.4 x 0.8 cm. No underlying skull fracture. There is partial opacification of the right mastoid air cells. Left mastoid air cells are clear. Visualized paranasal sinuses are clear. Globes and orbits are within normal limits     Impression: Impression: 1. Small hematoma within the scalp overlying the right parietal bone at the vertex. No underlying skull fracture. 2. No acute intracranial abnormality. 3. Partial opacification of the right mastoid air cells unchanged from 11/15/2024 Electronically Signed: Stephan Dey MD  12/1/2024 6:27 PM EST  Workstation ID: [id number]         Assessment & Plan   Assessment / Plan       Active Hospital Problems:  Active Hospital Problems    Diagnosis     **Hypoxia    Status post cardiac arrest secondary to hypoxia  Acute hypoxemic and hypercapnic respiratory failure require mechanical ventilation  Hemoptysis  Concern for intraparenchymal bleed   Status post thoracentesis  Candidemia  Acute on chronic hypoxic respiratory failure  Acute cardiogenic pulmonary edema  End-stage renal disease on dialysis leading to heart failure  Acute diastolic heart failure with EF of 56-60  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  Wean O2 to keep SPO2 greater than 90%  Continue frequent suctioning  Off of pressors.  Continue midodrine 10 mg 3 times daily.   Appreciate nephrology input.  Plans to dialyze today via fistula  Mental status unchanged.  Suspect secondary to encephalopathy.  Could be hypoxia related.  EEG was consistent with encephalopathy/generalized slowing.  Keppra was stopped yesterday.  I did  check a head CT today as he was too unstable to get one earlier this hospitalization.  It did not show any acute or obvious pathology.  Ideally, I would get a brain MRI.  But given his current disposition I think I would have to sedate him for it and I think given his respiratory status need for frequent suctioning, he would probably have to be intubated to make that happen  On day 7 of micafungin for Candida fungemia.  Repeat cultures negative  On day 7 of vancomycin for Staph epidermidis bacteremia.  Recently completed 6 weeks of antibiotics for Staph epidermidis discitis.  Per ID, they would like a CT of the abdomen and pelvis as well as a MRI of the T and L-spine.  Repeat blood cultures negative.  I think for us to do any of this imaging we would have to sedate him in his current state as he would have quite a bit of motion artifact.  I think that would make his respiratory status much more tenuous.  At this point we will monitor over time and image when clinically stable  Transfuse for hemoglobin less than 7  Continue  Brovana Pulmicort and add DuoNeb.  Bronchoscopy cultures negative so far.  Replace Cortrak today.  Restart tube feeds per dietitian recommendations  DNR for now.  Family okay with intubation    Discussed with family at bedside    VTE Prophylaxis:  Pharmacologic & mechanical VTE prophylaxis orders are present.    CODE STATUS:   Level Of Support Discussed With: Next of Kin (If No Surrogate)  Code Status (Patient has no pulse and is not breathing): No CPR (Do Not Attempt to Resuscitate)  Medical Interventions (Patient has pulse or is breathing): Full Support    Discussed with family at bedside    Patient is critically ill in ICU with status post cardiac arrest, hypoxic arrest, hemoptysis, hypovolemic shock, ESRD, respiratory failure, altered mental status, bacteremia and fungemia. I spent 31 minutes of critical care time excluding any procedure notes, spent in review, analysis, obtaining history  and physical, formulating care plan, and I led multi-disciplinary critical care rounds with bedside nurse, respiratory therapist, clinical pharmacist and other allied services. I have discussed the case with primary service and other consultants as well.     Electronically signed by Scottie Valentin MD, 12/13/2024, 11:38 EST.

## 2024-12-13 NOTE — PLAN OF CARE
Goal Outcome Evaluation:  Plan of Care Reviewed With: patient, child        Progress: no change  Outcome Evaluation: Patient completed hemodialysis today. 300ml removed. CT of head completed. Coretrak replaced today. Tube feeds restarted. Family at bedside.

## 2024-12-13 NOTE — PLAN OF CARE
Problem: Adult Inpatient Plan of Care  Goal: Plan of Care Review  Outcome: Not Progressing  Flowsheets (Taken 12/13/2024 0408)  Outcome Evaluation: Pt on 2L/NC throughout night.  NT suction x 1 overnight - suctioned large amt thick mucous.  Pt opened eyes spontaneously off and on during the shift, but did not follow any commands. No eye contact, just a blank stare. No moaning. No attempts to verbalize anything.  Whenever this RN tried to turn pt to the R side, he'd move himself back to his back or slightly to his left.  Pt appeared to be sleeping most of the shift.  Plan of Care Reviewed With:   caregiver   child  Goal: Patient-Specific Goal (Individualized)  Outcome: Not Progressing  Flowsheets (Taken 12/13/2024 0408)  Patient/Family-Specific Goals (Include Timeframe):   Goal was to promote sleep/rest during this shift   pt appeared to sleep well between interventions.  Goal: Absence of Hospital-Acquired Illness or Injury  Outcome: Not Progressing  Intervention: Identify and Manage Fall Risk  Recent Flowsheet Documentation  Taken 12/13/2024 0351 by Cecilia Greenfield RN  Safety Promotion/Fall Prevention:   safety round/check completed   fall prevention program maintained  Taken 12/13/2024 0300 by Cecilia Greenfield RN  Safety Promotion/Fall Prevention:   safety round/check completed   fall prevention program maintained  Taken 12/13/2024 0200 by Cecilia Greenfield RN  Safety Promotion/Fall Prevention:   safety round/check completed   fall prevention program maintained  Taken 12/13/2024 0100 by Cecilia Greenfield RN  Safety Promotion/Fall Prevention:   safety round/check completed   fall prevention program maintained  Taken 12/13/2024 0001 by Cecilia Greenfield RN  Safety Promotion/Fall Prevention:   safety round/check completed   fall prevention program maintained  Taken 12/12/2024 2200 by Cecilia Greenfield RN  Safety Promotion/Fall Prevention:   safety round/check completed   fall prevention program maintained  Taken 12/12/2024 2100 by  [place], Cecilia, RN  Safety Promotion/Fall Prevention:   safety round/check completed   fall prevention program maintained  Taken 12/12/2024 2022 by Cecilia Greenfield RN  Safety Promotion/Fall Prevention:   safety round/check completed   fall prevention program maintained  Taken 12/12/2024 2000 by Cecilia Greenfield RN  Safety Promotion/Fall Prevention:   safety round/check completed   fall prevention program maintained  Taken 12/12/2024 1900 by Cecilia Greenfield RN  Safety Promotion/Fall Prevention:   safety round/check completed   fall prevention program maintained  Intervention: Prevent Skin Injury  Recent Flowsheet Documentation  Taken 12/13/2024 0351 by Cecilia Greenfield RN  Body Position:   turned   supine  Taken 12/13/2024 0001 by Cecilia Greenfield RN  Body Position:   turned   right  Taken 12/12/2024 2200 by Cecilia Greenfield RN  Body Position:   supine   turned  Taken 12/12/2024 2022 by Cecilia Greenfield RN  Body Position: (pillows between knees)   left   turned  Intervention: Prevent and Manage VTE (Venous Thromboembolism) Risk  Flowsheets  Taken 12/13/2024 0408  VTE Prevention/Management: (sequentials on most of the shift.)   bilateral   SCDs (sequential compression devices) on  Taken 12/13/2024 0351  VTE Prevention/Management:   bilateral   SCDs (sequential compression devices) on  Taken 12/13/2024 0001  VTE Prevention/Management:   bilateral   SCDs (sequential compression devices) on  Taken 12/12/2024 2200  VTE Prevention/Management:   bilateral   SCDs (sequential compression devices) on  Intervention: Prevent Infection  Recent Flowsheet Documentation  Taken 12/13/2024 0351 by Cecilia Greenfield RN  Infection Prevention:   rest/sleep promoted   single patient room provided   personal protective equipment utilized   hand hygiene promoted   environmental surveillance performed  Taken 12/13/2024 0300 by Cecilia Greenfield RN  Infection Prevention:   rest/sleep promoted   single patient room provided   personal protective equipment  utilized   hand hygiene promoted   environmental surveillance performed  Taken 12/13/2024 0200 by Cecilia Greenfield RN  Infection Prevention:   rest/sleep promoted   single patient room provided   personal protective equipment utilized   hand hygiene promoted   environmental surveillance performed  Taken 12/13/2024 0100 by Cecilia Greenfield RN  Infection Prevention:   rest/sleep promoted   single patient room provided   personal protective equipment utilized   hand hygiene promoted   environmental surveillance performed  Taken 12/13/2024 0001 by Cecilia Greenfield RN  Infection Prevention:   rest/sleep promoted   single patient room provided   personal protective equipment utilized   hand hygiene promoted   environmental surveillance performed  Taken 12/12/2024 2200 by Cecilia Greenfield RN  Infection Prevention:   rest/sleep promoted   single patient room provided   personal protective equipment utilized   hand hygiene promoted   environmental surveillance performed  Taken 12/12/2024 2100 by Cecilia Greenfield RN  Infection Prevention:   rest/sleep promoted   single patient room provided   personal protective equipment utilized   hand hygiene promoted   environmental surveillance performed  Taken 12/12/2024 2022 by Cecilia Greenfield RN  Infection Prevention:   rest/sleep promoted   single patient room provided   equipment surfaces disinfected  Taken 12/12/2024 2000 by Cecilia Greenfield RN  Infection Prevention:   rest/sleep promoted   single patient room provided   personal protective equipment utilized   hand hygiene promoted   environmental surveillance performed  Taken 12/12/2024 1900 by Cecilia Greenfield RN  Infection Prevention:   rest/sleep promoted   single patient room provided   personal protective equipment utilized   hand hygiene promoted   environmental surveillance performed  Goal: Readiness for Transition of Care  Outcome: Not Progressing     Problem: Comorbidity Management  Goal: Blood Glucose Level Within Target  Range  Outcome: Not Progressing     Problem: Adult Inpatient Plan of Care  Goal: Optimal Comfort and Wellbeing  Outcome: Progressing     Problem: Fall Injury Risk  Goal: Absence of Fall and Fall-Related Injury  Outcome: Progressing  Intervention: Promote Injury-Free Environment  Recent Flowsheet Documentation  Taken 12/13/2024 0351 by Cecilia Greenfield RN  Safety Promotion/Fall Prevention:   safety round/check completed   fall prevention program maintained  Taken 12/13/2024 0300 by Cecilia Greenfield RN  Safety Promotion/Fall Prevention:   safety round/check completed   fall prevention program maintained  Taken 12/13/2024 0200 by Cecilia Greenfield RN  Safety Promotion/Fall Prevention:   safety round/check completed   fall prevention program maintained  Taken 12/13/2024 0100 by Cecilia Greenfield RN  Safety Promotion/Fall Prevention:   safety round/check completed   fall prevention program maintained  Taken 12/13/2024 0001 by Cecilia Greenfield RN  Safety Promotion/Fall Prevention:   safety round/check completed   fall prevention program maintained  Taken 12/12/2024 2200 by Cecilia Greenfield RN  Safety Promotion/Fall Prevention:   safety round/check completed   fall prevention program maintained  Taken 12/12/2024 2100 by Cecilia Greenfield RN  Safety Promotion/Fall Prevention:   safety round/check completed   fall prevention program maintained  Taken 12/12/2024 2022 by Cecilia Greenfield RN  Safety Promotion/Fall Prevention:   safety round/check completed   fall prevention program maintained  Taken 12/12/2024 2000 by Cecilia Greenfield RN  Safety Promotion/Fall Prevention:   safety round/check completed   fall prevention program maintained  Taken 12/12/2024 1900 by Cecilia Greenfield RN  Safety Promotion/Fall Prevention:   safety round/check completed   fall prevention program maintained     Problem: Comorbidity Management  Goal: Blood Pressure in Desired Range  Outcome: Progressing  Goal: Maintenance of Osteoarthritis Symptom Control  Outcome:  Progressing  Intervention: Maintain Osteoarthritis Symptom Control  Recent Flowsheet Documentation  Taken 12/13/2024 0351 by Cecilia Greenfield RN  Activity Management: bedrest  Taken 12/13/2024 0001 by Cecilia Greenfield RN  Activity Management: bedrest  Taken 12/12/2024 2022 by Cecilia Greenfield RN  Activity Management: bedrest     Problem: Violence Risk or Actual  Goal: Anger and Impulse Control  Outcome: Progressing  Intervention: Minimize Safety Risk  Recent Flowsheet Documentation  Taken 12/13/2024 0351 by Cecilia Greenfield RN  Enhanced Safety Measures: room near unit station  Taken 12/13/2024 0300 by Cecilia Greenfield RN  Enhanced Safety Measures:   room near unit station   bed alarm set  Taken 12/13/2024 0200 by Cecilia Greenfield RN  Enhanced Safety Measures:   room near unit station   bed alarm set  Taken 12/13/2024 0100 by Cecilia Greenfield RN  Enhanced Safety Measures:   room near unit station   bed alarm set  Taken 12/13/2024 0001 by Cecilia Greenfield RN  Enhanced Safety Measures:   room near unit station   bed alarm set  Taken 12/12/2024 2200 by Cecilia Greenfield RN  Enhanced Safety Measures: room near unit station  Taken 12/12/2024 2100 by Cecilia Greenfield RN  Enhanced Safety Measures: room near unit station  Taken 12/12/2024 2022 by Cecilia Greenfield RN  Enhanced Safety Measures:   room near unit station   bed alarm set  Taken 12/12/2024 2000 by Cecilia Greenfield RN  Enhanced Safety Measures:   room near unit station   bed alarm set  Taken 12/12/2024 1900 by Cecilia Greenfield RN  Enhanced Safety Measures:   room near unit station   bed alarm set     Problem: Skin Injury Risk Increased  Goal: Skin Health and Integrity  Outcome: Progressing  Intervention: Optimize Skin Protection  Recent Flowsheet Documentation  Taken 12/13/2024 0351 by Cecilia Greenfield RN  Activity Management: bedrest  Head of Bed (HOB) Positioning: (heels elevated off bed with pillows) --  Taken 12/13/2024 0001 by Cecilia Greenfield RN  Activity Management: bedrest  Head of Bed  ([facility]) Positioning: [facility] at 30-45 degrees  Taken 12/12/2024 2200 by Cecilia Greenfield RN  Head of Bed ([facility]) Positioning: HOB at 30-45 degrees  Taken 12/12/2024 2022 by Cecilia Greenfield RN  Activity Management: bedrest  Head of Bed ([facility]) Positioning: HOB at 30-45 degrees     Problem: Gas Exchange Impaired  Goal: Optimal Gas Exchange  Outcome: Progressing  Intervention: Optimize Oxygenation and Ventilation  Recent Flowsheet Documentation  Taken 12/13/2024 0351 by Cecilia Greenfield RN  Head of Bed ([facility]) Positioning: (heels elevated off bed with pillows) --  Taken 12/13/2024 0001 by Cecilia Greenfield RN  Head of Bed ([facility]) Positioning: [facility] at 30-45 degrees  Taken 12/12/2024 2200 by Cecilia Greenfield RN  Head of Bed ([facility]) Positioning: HOB at 30-45 degrees  Taken 12/12/2024 2022 by Cecilia Greenfield RN  Head of Bed ([facility]) Positioning: [facility] at 30-45 degrees     Problem: Noninvasive Ventilation Acute  Goal: Effective Unassisted Ventilation and Oxygenation  Outcome: Met     Problem: Mechanical Ventilation Invasive  Goal: Optimal Device Function  Intervention: Optimize Device Care and Function  Recent Flowsheet Documentation  Taken 12/13/2024 0351 by Cecilia Greenfield RN  Oral Care:   lip/mouth moisturizer applied   teeth brushed - suction toothbrush   tongue brushed  Taken 12/13/2024 0001 by Cecilia Greenfield RN  Oral Care:   lip/mouth moisturizer applied   teeth brushed - suction toothbrush   tongue brushed  Taken 12/12/2024 2022 by Cecilia Greenfield RN  Oral Care:   swabbed with antiseptic solution   lip/mouth moisturizer applied  Goal: Absence of Ventilator-Induced Lung Injury  Intervention: Prevent Ventilator-Associated Pneumonia  Recent Flowsheet Documentation  Taken 12/13/2024 0351 by Cecilia Greenfield RN  Head of Bed ([facility]) Positioning: (heels elevated off bed with pillows) --  Oral Care:   lip/mouth moisturizer applied   teeth brushed - suction toothbrush   tongue brushed  Taken 12/13/2024 0001 by Cecilia Greenfield RN  Head of Bed ([facility]) Positioning: [facility]  at 30-45 degrees  Oral Care:   lip/mouth moisturizer applied   teeth brushed - suction toothbrush   tongue brushed  Taken 12/12/2024 2200 by Cecilia Greenfield RN  Head of Bed ([facility]) Positioning: HOB at 30-45 degrees  Taken 12/12/2024 2022 by Cecilia Greenfield RN  Head of Bed ([facility]) Positioning: [facility] at 30-45 degrees  Oral Care:   swabbed with antiseptic solution   lip/mouth moisturizer applied     Problem: Restraint, Nonviolent  Goal: Absence of Harm or Injury  Intervention: Implement Least Restrictive Safety Strategies  Recent Flowsheet Documentation  Taken 12/12/2024 2200 by Cecilia Greenfield RN  Medical Device Protection: torso covered  Taken 12/12/2024 2022 by Cecilia Greenfield RN  Medical Device Protection: torso covered  Intervention: Protect Skin and Joint Integrity  Recent Flowsheet Documentation  Taken 12/13/2024 0351 by Cecilia Greenfield RN  Body Position:   turned   supine  Taken 12/13/2024 0001 by Cecilia Greenfield RN  Body Position:   turned   right  Range of Motion: (pt has been moving all four extremities but not near as much as Sunday night when this RN last had pt.) --  Taken 12/12/2024 2200 by Cecilia Greenfield RN  Body Position:   supine   turned  Taken 12/12/2024 2022 by Cecilia Greenfield RN  Body Position: (pillows between knees)   left   turned   Goal Outcome Evaluation:  Plan of Care Reviewed With: caregiver, child           Outcome Evaluation: Pt on 2L/NC throughout night.  NT suction x 1 overnight - suctioned large amt thick mucous.  Pt opened eyes spontaneously off and on during the shift, but did not follow any commands. No eye contact, just a blank stare. No moaning. No attempts to verbalize anything.  Whenever this RN tried to turn pt to the R side, he'd move himself back to his back or slightly to his left.  Pt appeared to be sleeping most of the shift.

## 2024-12-13 NOTE — PROGRESS NOTES
[facility]     Nephrology Progress Note      Patient Name: Nelson Wang  : 1945  MRN: [id number]  Primary Care Physician:  Domingo Bravo MD  Date of admission: 2024    Subjective   Subjective     Interval History:  Events noted.  Off pressors, NG tube is out.  Altered, no distress, slightly restless  Audible rhonchi and intermittent cough.    Review of Systems   Review of systems could not be obtained due to   patient intubated.    Objective   Objective     Vitals:   Temp:  [97.5 °F (36.4 °C)-100.2 °F (37.9 °C)] 99.3 °F (37.4 °C)  Heart Rate:  [] 90  Resp:  [19-30] 20  BP: ()/(37-67) 112/44  Flow (L/min) (Oxygen Therapy):  [2-4] 2  Physical Exam:   Constitutional: Somewhat restless, on O2 per nasal cannula    Eyes: sclerae anicteric, no conjunctival injection   HENT: mucous membranes moist   Neck: Supple, no thyromegaly, no lymphadenopathy, trachea midline, No JVD   Respiratory: Decreased to auscultation bilaterally, nonlabored respirations,  Subcutaneous emphysema.  Upper airway rhonchi with intermittent cough   Cardiovascular: Irregular, tachycardic, no murmurs, rubs, or gallops.   Gastrointestinal: Positive bowel sounds, soft, nondistended   Musculoskeletal: No edema, no clubbing or cyanosis, left upper extremity AV fistula is patent with good thrill and bruit.   Psychiatric: Restless   Neurologic: Moving extremities eyes open but not following commands.   Skin: warm and dry, no rashes   Right femoral A-line and Shiley.     Result Review    Result Reviewed:  I have personally reviewed the results from the time of this admission to 2024 08:23 EST and agree with these findings:  [x]  Laboratory  []  Microbiology  [x]  Radiology  []  EKG/Telemetry   []  Cardiology/Vascular   []  Pathology  [x]  Old records  []  Other:        Lab 24  0319 24  0417 24  0611 24  0431 24  0008 12/10/24  1814 12/10/24  1221 12/10/24  0620 24  2318  12/09/24  1830 12/09/24  1220 12/09/24  0622 12/09/24  0009 12/08/24  0410 12/07/24  2346 12/07/24  1822 12/07/24  1154 12/07/24  0606 12/07/24  0007 12/06/24  1409 12/06/24  1248 12/06/24  1238   SODIUM 140 139 135*  135*  --  137 138 138 136 139 139 138 136 140 136 136 137 136 136  136 140  --   --  144   POTASSIUM 4.1 4.1 3.9  3.9  --  3.8 3.9 3.9 4.1 3.9 4.2 3.7 4.4 4.4 4.4 4.6 4.6 4.5 4.4  4.4 4.2  --   --  3.6   CHLORIDE 102 104 102  102  --  105 105 108* 103 107 107 112* 102 106 102 104 103 102 103  103 104  --   --  101   CO2 24.9 20.8* 19.3*  19.3*  --  21.5* 22.7 21.5* 20.3* 22.3 21.1* 18.5* 20.9* 20.7* 19.5* 19.7* 21.1* 21.7* 22.1  22.1 24.2  --   --  36.5*   BUN 66* 38* 18  18  --  17 16 17 17 19 21 18 20 22 13 10 9 9 9  9 10  --   --  16   CREATININE 5.19* 3.32* 1.55*  1.55* 1.49* 1.61* 1.73* 1.68* 1.75* 1.82* 2.09* 1.71* 2.16* 2.31* 1.45* 1.39* 1.37* 1.45* 1.81*  1.81* 2.13* 4.59* 4.46* 4.90*   GLUCOSE 266* 172* 236*  236*  --  220* 181* 148* 188* 160* 148* 121* 136* 146* 142* 138* 83 98 75  75 80  --   --  125*   EGFR 10.7* 18.3* 45.5*  45.5*  --  43.5* 39.9* 41.3* 39.4* 37.6* 31.8* 40.5* 30.6* 28.2* 49.3* 51.9* 52.8* 49.3* 37.8*  37.8* 31.1*  --   --  11.4*   ANION GAP 13.1 14.2 13.7  13.7  --  10.5 10.3 8.5 12.7 9.7 10.9 7.5 13.1 13.3 14.5 12.3 12.9 12.3 10.9  10.9 11.8  --   --  6.5   MAGNESIUM 3.1* 2.8* 2.5*  --   --   --   --  2.4  --   --   --  2.6*  --  2.3 2.3  --   --  2.1  --   --   --  1.9   PHOSPHORUS 2.2* 2.4* 2.8  --  2.4* 2.6 2.6 2.7 3.0 3.4 3.0 4.0 4.0 3.5 4.4 4.6* 3.8 3.5 2.7  --   --  5.7*             Assessment & Plan   Assessment / Plan       Active Hospital Problems:  Active Hospital Problems    Diagnosis     **Hypoxia        Assessment and Plan:    - ESRD, was on home dialysis. Off CRRT.  Off pressors, hemodynamically stable plan dialysis today, UF 1 to 1.5 kg if tolerated.  Using right femoral Shiley (placed for CRRT) for now but can use left upper extremity AV  fistula.    - Volume overload, much better now.       - Hypoxia on admission with large pleural effusion, status post thoracentesis complicated by pulmonary hemorrhage, status post right-sided chest tube and bronchoscopy.  Intubation then extubation.  Better, per pulmonary.    - Aspiration pneumonia, and possibly recurrent aspiration, per pulmonary.     - Type 2 diabetes with complications.    - History of hypertension, blood pressure is acceptable now, off pressors .    - Anemia of chronic kidney disease, hemoglobin below goal.  On RONALD as outpatient.  Getting Epogen while here.    - Altered mentation.  Multifactorial.    Prognosis is guarded.  Discussed with son-in-law.  Will follow.    Please call with any questions.

## 2024-12-13 NOTE — THERAPY EVALUATION
Patient Name: Nelson Wang  : 1945    MRN: [id number]                              Today's Date: 2024       Admit Date: 2024    Visit Dx:     ICD-10-CM ICD-9-CM   1. Acute respiratory failure with hypoxia  J96.01 518.81   2. Acute pulmonary edema  J81.0 518.4     Patient Active Problem List   Diagnosis    Essential hypertension    Bradycardia, sinus    Anemia due to chronic kidney disease    Hypothyroidism    Idiopathic gout    Proteinuria    Psoriasis    Thrombocytopenia    Type 2 diabetes mellitus without complication    CKD (chronic kidney disease), stage IV    Hyperlipidemia    Monoclonal gammopathy of unknown significance (MGUS)    Stage 5 chronic kidney disease    Chronic gout without tophus    Peritoneal dialysis catheter dysfunction    Medicare annual wellness visit, subsequent    Chronic cough    Peritonitis associated with peritoneal dialysis    Recurrent pneumonia    Acute on chronic respiratory failure with hypoxia    End stage renal disease    Aspiration pneumonitis    Sepsis    Type 2 diabetes mellitus, with long-term current use of insulin    GERD without esophagitis    Immunosuppression due to drug therapy    Bipolar disorder    Chronic respiratory failure with hypoxia    Shortness of breath    Abnormal stress test    ESRD on dialysis    Abnormal chest CT    Encounter for screening for malignant neoplasm of colon    History of colon polyps    Family history of colon cancer    Intractable pain    Abdominal pain    ESRD (end stage renal disease) on dialysis    AMS (altered mental status)    Hallucinations    LUQ pain    Discitis    Metabolic encephalopathy    Aspiration pneumonia    Discitis of lumbar region    Severe malnutrition    Dementia    Unspecified severe protein-calorie malnutrition    Hypoxia     Past Medical History:   Diagnosis Date    Abnormal stress test     Anemia     IRON INFUSIONS WITH DIALYSIS    Anesthesia     WITH HIP REPLACEMENT DAUGHTER BELIEVES HAD SVT      Ankle pain, right     Anxiety and depression     Arthritis     CKD (chronic kidney disease), stage IV     DIALYSIS [id number] SHILEY IN RIGHT CHEST    Diabetes     GERD (gastroesophageal reflux disease)     Gout     High cholesterol     History of peritoneal dialysis     HL (hearing loss)     Hyperkalemia     Hypertension     Hypothyroidism     Insomnia     Night terrors     Psoriasis     Psoriasis     Sleep apnea     Sleep walking     Thrombocytopenia     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    Vitiligo      Past Surgical History:   Procedure Laterality Date    ABDOMINAL SURGERY      ANKLE SURGERY Right 11/1990    APPENDECTOMY N/A 1954    ARTERIOVENOUS FISTULA/SHUNT SURGERY Left 07/16/2024    Procedure: Creation of left arm arteriovenous fistula;  Surgeon: Moses Mir MD;  Location: [facility] MAIN OR;  Service: Vascular;  Laterality: Left;    BRONCHOSCOPY N/A 03/01/2024    Procedure: BRONCHOSCOPY WITH BAL AND WASHINGS;  Surgeon: Sandra Espinal MD;  Location: [facility] ENDOSCOPY;  Service: Pulmonary;  Laterality: N/A;  PNEUMONIA    BRONCHOSCOPY N/A 08/30/2024    Procedure: BRONCHOSCOPY WITH BALS AND WASHINGS;  Surgeon: Sandra Espinal MD;  Location: [facility] ENDOSCOPY;  Service: Pulmonary;  Laterality: N/A;  MUCOUS PLUGGING    CARDIAC CATHETERIZATION N/A 05/29/2024    Procedure: Left Heart Cath with possible coronary angioplasty;  Surgeon: Stephan Nichols MD;  Location: [facility] CATH INVASIVE LOCATION;  Service: Cardiology;  Laterality: N/A;    COLONOSCOPY N/A 06/2022    [facility]    ENDOSCOPY N/A 10/28/2024    Procedure: ESOPHAGOGASTRODUODENOSCOPY INSERTION OF LIGHTED INSTRUMENT TO VIEW ESOPHAGUS, STOMACH AND SMALL INTESTINE;  Surgeon: Kingsley Peraza MD;  Location: [facility] ENDOSCOPY;  Service: Gastroenterology;  Laterality: N/A;  Gastritis    FRACTURE SURGERY      HIP BIPOLAR REPLACEMENT Right 01/2000    INSERTION HEMODIALYSIS CATHETER Left 04/09/2024    Procedure: HEMODIALYSIS CATHETER  INSERTION;  Surgeon: Jose Berry MD;  Location: [facility] MAIN OR;  Service: General;  Laterality: Left;    INSERTION HEMODIALYSIS CATHETER N/A 04/12/2024    Procedure: Tunneled hemodialysis catheter insertion;  Surgeon: Enrique Vinson MD;  Location: [facility] MAIN OR;  Service: Vascular;  Laterality: N/A;    INSERTION PERITONEAL DIALYSIS CATHETER N/A 03/27/2023    Procedure: LAPAROSCOPIC INSERTION PERITONEAL DIALYSIS CATHETER, LAPAROSCOPIC OMENTOPEXY WITH LYSIS OF ADHESIONS;  Surgeon: Jose Berry MD;  Location: [facility] MAIN OR;  Service: General;  Laterality: N/A;    INSERTION PERITONEAL DIALYSIS CATHETER Left 07/23/2023    Procedure: REVISION OF PERITONEAL DIALYSIS CATHETER;  Surgeon: Radha Oreilly MD;  Location: [facility] MAIN OR;  Service: General;  Laterality: Left;    JOINT REPLACEMENT      REMOVAL PERITONEAL DIALYSIS CATHETER N/A 04/09/2024    Procedure: REMOVAL PERITONEAL DIALYSIS CATHETER;  Surgeon: Jose Berry MD;  Location: [facility] MAIN OR;  Service: General;  Laterality: N/A;    RENAL BIOPSY Left 07/15/2022    UPPER GASTROINTESTINAL ENDOSCOPY      VASCULAR SURGERY      WRIST SURGERY      UNSURE WHICH SIDE DAUGHTER REPORTS HAD SEVERE  CUT FROM WINDOW AND THEY HAD TO DO RECONSTRUCTIVE SURGERY WITH VESSELS AND NERVES      General Information       Row Name 12/13/24 0933          OT Time and Intention    Document Type evaluation  -PG     Mode of Treatment individual therapy;occupational therapy  -PG       Row Name 12/13/24 0933          General Information    Patient Profile Reviewed yes  Lives with wife and family.  Recently out of the hospital and walking short distances with assistance.  Dependent with all self-care previously  -PG     Prior Level of Function max assist:;ADL's;dependent:  -PG     Existing Precautions/Restrictions fall  -PG     Barriers to Rehab none identified  -PG       Row Name 12/13/24 0933          Occupational Profile    Reason for  Services/Referral (Occupational Profile) Patient is a 79-year-old male admitted for acute pulmonary edema with respiratory failure.  Patient is being evaluated by Occupational Therapy due to recent decline in ADL function.  No previous OT services identified  -[facility] Name 12/13/24 33          Living Environment    People in Home spouse  -[facility] Name 12/13/24 [facility]          Cognition    Orientation Status (Cognition) unable/difficult to assess  -[facility] Name 12/13/24 [facility]          Safety Issues/Impairments Affecting Functional Mobility    Impairments Affecting Function (Mobility) balance;cognition;endurance/activity tolerance;strength;range of motion (ROM)  -               User Key  (r) = Recorded By, (t) = Taken By, (c) = Cosigned By      Initials Name Provider Type     Chauncey Goldberg, OT Occupational Therapist                     Mobility/ADL's       [facility] Name 12/13/24 [facility]          Bed Mobility    Bed Mobility bed mobility (all) activities  -     All Activities, [place] (Bed Mobility) dependent (less than 25% patient effort)  -[facility] Name 12/13/24 [facility]          Activities of Daily Living    BADL Assessment/Intervention bathing;upper body dressing;lower body dressing;grooming;toileting  -[facility] Name 12/13/24 [facility]          Bathing Assessment/Intervention    [place] Level (Bathing) bathing skills;dependent (less than 25% patient effort)  -[facility] Name 12/13/24 [facility]          Upper Body Dressing Assessment/Training    [place] Level (Upper Body Dressing) upper body dressing skills;dependent (less than 25% patient effort)  -[facility] Name 12/13/24 [facility]          Lower Body Dressing Assessment/Training    [place] Level (Lower Body Dressing) lower body dressing skills;dependent (less than 25% patient effort)  -PG       Row Name 12/13/24 [facility]          Grooming Assessment/Training    [place] Level (Grooming) grooming skills;dependent (less than 25% patient  effort)  -PG       Row Name 12/13/24 0936          Toileting Assessment/Training    [place] Level (Toileting) toileting skills;dependent (less than 25% patient effort)  -PG               User Key  (r) = Recorded By, (t) = Taken By, (c) = Cosigned By      Initials Name Provider Type    PG Chauncey Goldberg, OT Occupational Therapist                   Obj/Interventions       Row Name 12/13/24 0942          Sensory Assessment (Somatosensory)    Sensory Assessment (Somatosensory) unable/difficult to assess  -PG       Row Name 12/13/24 0942          Vision Assessment/Intervention    Visual Impairment/Limitations unable/difficult to assess  -PG       Row Name 12/13/24 0942          Range of Motion Comprehensive    Comment, General Range of Motion PROM wfl, no purposeful movement at this time  -PG               User Key  (r) = Recorded By, (t) = Taken By, (c) = Cosigned By      Initials Name Provider Type    PG Chauncey Goldberg, OT Occupational Therapist                   Goals/Plan       Row Name 12/13/24 0943          Transfer Goal 1 (OT)    Activity/Assistive Device (Transfer Goal 1, OT) transfers, all  -PG     [place] Level/Cues Needed (Transfer Goal 1, OT) minimum assist (75% or more patient effort)  -PG     Time Frame (Transfer Goal 1, OT) long term goal (LTG);10 days  -PG       Row Name 12/13/24 0943          Bathing Goal 1 (OT)    Activity/Device (Bathing Goal 1, OT) bathing skills, all;upper body bathing  -PG     [place] Level/Cues Needed (Bathing Goal 1, OT) minimum assist (75% or more patient effort)  -PG     Time Frame (Bathing Goal 1, OT) long term goal (LTG);10 days  -PG       Row Name 12/13/24 0943          Dressing Goal 1 (OT)    Activity/Device (Dressing Goal 1, OT) dressing skills, all;upper body dressing  -PG     [place]/Cues Needed (Dressing Goal 1, OT) minimum assist (75% or more patient effort)  -PG     Time Frame (Dressing Goal 1, OT) long term goal (LTG);10 days  -PG       Row Name  12/13/24 0943          Toileting Goal 1 (OT)    Activity/Device (Toileting Goal 1, OT) toileting skills, all  -PG     [place] Level/Cues Needed (Toileting Goal 1, OT) minimum assist (75% or more patient effort)  -PG     Time Frame (Toileting Goal 1, OT) long term goal (LTG);10 days  -PG       Row Name 12/13/24 0943          Grooming Goal 1 (OT)    Activity/Device (Grooming Goal 1, OT) grooming skills, all  -PG     [place] (Grooming Goal 1, OT) minimum assist (75% or more patient effort)  -PG     Time Frame (Grooming Goal 1, OT) long term goal (LTG);10 days  -PG       Row Name 12/13/24 0943          Problem Specific Goal 1 (OT)    Problem Specific Goal 1 (OT) Patient will maintain an alert state for greater than 5 minutes to facilitate independence with simple grooming and bathing activities  -PG     Time Frame (Problem Specific Goal 1, OT) long term goal (LTG);10 days  -PG       Row Name 12/13/24 0943          Therapy Assessment/Plan (OT)    Planned Therapy Interventions (OT) activity tolerance training;BADL retraining;strengthening exercise;transfer/mobility retraining;patient/caregiver education/training;occupation/activity based interventions;passive ROM/stretching  -PG               User Key  (r) = Recorded By, (t) = Taken By, (c) = Cosigned By      Initials Name Provider Type    PG Chauncey Goldberg OT Occupational Therapist                   Clinical Impression       Row Name 12/13/24 0942          Plan of Care Review    Plan of Care Reviewed With patient  -PG     Outcome Evaluation Patient presents with limitations affecting strength, activity tolerance, and balance impacting patient's ability to return home safely and independently.  The skills of a therapist will be required to safely and effectively implement the following treatment plan to restore maximal level of function  -PG       Row Name 12/13/24 0942          Therapy Assessment/Plan (OT)    Patient/Family Therapy Goal Statement (OT) unkwown   -PG     Rehab Potential (OT) --  guarded  -PG     Criteria for Skilled Therapeutic Interventions Met (OT) yes;meets criteria;skilled treatment is necessary  -PG     Therapy Frequency (OT) 5 times/wk  -PG       Row Name 12/13/24 0942          Therapy Plan Review/Discharge Plan (OT)    Anticipated Discharge Disposition (OT) sub acute care setting  -PG               User Key  (r) = Recorded By, (t) = Taken By, (c) = Cosigned By      Initials Name Provider Type    PG Chauncey Goldberg OT Occupational Therapist                   Outcome Measures       Row Name 12/13/24 0945          How much help from another is currently needed...    Putting on and taking off regular lower body clothing? 1  -PG     Bathing (including washing, rinsing, and drying) 1  -PG     Toileting (which includes using toilet bed pan or urinal) 1  -PG     Putting on and taking off regular upper body clothing 1  -PG     Taking care of personal grooming (such as brushing teeth) 1  -PG     Eating meals 1  -PG     AM-PAC 6 Clicks Score (OT) 6  -PG       Row Name 12/13/24 0994          Functional Assessment    Outcome Measure Options AM-PAC 6 Clicks Daily Activity (OT);Optimal Instrument  -PG       Row Name 12/13/24 0945          Optimal Instrument    Optimal Instrument Optimal - 3  -PG     Bending/Stooping 5  -PG     Standing 5  -PG     Reaching 5  -PG     From the list, choose the 3 activities you would most like to be able to do without any difficulty Bending/stooping;Standing;Reaching  -PG     Total Score Optimal - 3 15  -PG               User Key  (r) = Recorded By, (t) = Taken By, (c) = Cosigned By      Initials Name Provider Type    Chauncey Vizcarra OT Occupational Therapist                    Occupational Therapy Education       Title: PT OT SLP Therapies (In Progress)       Topic: Occupational Therapy (In Progress)       Point: ADL training (In Progress)       Description:   Instruct learner(s) on proper safety adaptation and remediation techniques  during self care or transfers.   Instruct in proper use of assistive devices.                  Learning Progress Summary            Patient Acceptance, E, NL by PG at 12/13/2024 0945                      Point: Home exercise program (In Progress)       Description:   Instruct learner(s) on appropriate technique for monitoring, assisting and/or progressing therapeutic exercises/activities.                  Learning Progress Summary            Patient Acceptance, E, NL by PG at 12/13/2024 0945                      Point: Precautions (In Progress)       Description:   Instruct learner(s) on prescribed precautions during self-care and functional transfers.                  Learning Progress Summary            Patient Acceptance, E, NL by PG at 12/13/2024 0945                      Point: Body mechanics (In Progress)       Description:   Instruct learner(s) on proper positioning and spine alignment during self-care, functional mobility activities and/or exercises.                  Learning Progress Summary            Patient Acceptance, E, NL by PG at 12/13/2024 0945                                      User Key       Initials Effective Dates Name Provider Type Discipline    PG 06/16/21 -  Chauncey Goldberg OT Occupational Therapist OT                  OT Recommendation and Plan  Planned Therapy Interventions (OT): activity tolerance training, BADL retraining, strengthening exercise, transfer/mobility retraining, patient/caregiver education/training, occupation/activity based interventions, passive ROM/stretching  Therapy Frequency (OT): 5 times/wk  Plan of Care Review  Plan of Care Reviewed With: patient  Outcome Evaluation: Patient presents with limitations affecting strength, activity tolerance, and balance impacting patient's ability to return home safely and independently.  The skills of a therapist will be required to safely and effectively implement the following treatment plan to restore maximal level of function      Time Calculation:   Evaluation Complexity (OT)  Review Occupational Profile/Medical/Therapy History Complexity: brief/low complexity  Assessment, Occupational Performance/Identification of Deficit Complexity: 3-5 performance deficits  Clinical Decision Making Complexity (OT): problem focused assessment/low complexity  Overall Complexity of Evaluation (OT): low complexity     Time Calculation- OT       Row Name 12/13/24 0947             Time Calculation- OT    OT Received On 12/13/24  -PG      OT Goal Re-Cert Due Date 12/22/24  -PG         Untimed Charges    OT Eval/Re-eval Minutes 35  -PG         Total Minutes    Untimed Charges Total Minutes 35  -PG       Total Minutes 35  -PG                User Key  (r) = Recorded By, (t) = Taken By, (c) = Cosigned By      Initials Name Provider Type    PG Chauncey Goldberg OT Occupational Therapist                  Therapy Charges for Today       Code Description Service Date Service Provider Modifiers Qty    05821816042 HC OT EVAL LOW COMPLEXITY 3 12/13/2024 Chauncey Goldberg OT GO 1                 Chauncey Goldberg OT  12/13/2024

## 2024-12-13 NOTE — PLAN OF CARE
Goal Outcome Evaluation:  Plan of Care Reviewed With: patient           Outcome Evaluation: Patient presents with limitations affecting strength, activity tolerance, and balance impacting patient's ability to return home safely and independently.  The skills of a therapist will be required to safely and effectively implement the following treatment plan to restore maximal level of function    Anticipated Discharge Disposition (OT): sub acute care setting

## 2024-12-14 LAB
ALBUMIN SERPL-MCNC: 3.4 G/DL (ref 3.5–5.2)
ANION GAP SERPL CALCULATED.3IONS-SCNC: 12.9 MMOL/L (ref 5–15)
BASOPHILS # BLD AUTO: 0.07 10*3/MM3 (ref 0–0.2)
BASOPHILS NFR BLD AUTO: 0.7 % (ref 0–1.5)
BUN SERPL-MCNC: 43 MG/DL (ref 8–23)
BUN/CREAT SERPL: 14.3 (ref 7–25)
CALCIUM SPEC-SCNC: 9.4 MG/DL (ref 8.6–10.5)
CHLORIDE SERPL-SCNC: 98 MMOL/L (ref 98–107)
CO2 SERPL-SCNC: 25.1 MMOL/L (ref 22–29)
CREAT SERPL-MCNC: 3 MG/DL (ref 0.76–1.27)
DEPRECATED RDW RBC AUTO: 58.5 FL (ref 37–54)
EGFRCR SERPLBLD CKD-EPI 2021: 20.6 ML/MIN/1.73
EOSINOPHIL # BLD AUTO: 0.43 10*3/MM3 (ref 0–0.4)
EOSINOPHIL NFR BLD AUTO: 4.6 % (ref 0.3–6.2)
ERYTHROCYTE [DISTWIDTH] IN BLOOD BY AUTOMATED COUNT: 21.4 % (ref 12.3–15.4)
GLUCOSE BLDC GLUCOMTR-MCNC: 197 MG/DL (ref 70–99)
GLUCOSE BLDC GLUCOMTR-MCNC: 199 MG/DL (ref 70–99)
GLUCOSE SERPL-MCNC: 242 MG/DL (ref 65–99)
HCT VFR BLD AUTO: 34.8 % (ref 37.5–51)
HGB BLD-MCNC: 10.6 G/DL (ref 13–17.7)
IMM GRANULOCYTES # BLD AUTO: 0.14 10*3/MM3 (ref 0–0.05)
IMM GRANULOCYTES NFR BLD AUTO: 1.5 % (ref 0–0.5)
LYMPHOCYTES # BLD AUTO: 1.9 10*3/MM3 (ref 0.7–3.1)
LYMPHOCYTES NFR BLD AUTO: 20.3 % (ref 19.6–45.3)
MAGNESIUM SERPL-MCNC: 2 MG/DL (ref 1.6–2.4)
MCH RBC QN AUTO: 27 PG (ref 26.6–33)
MCHC RBC AUTO-ENTMCNC: 30.5 G/DL (ref 31.5–35.7)
MCV RBC AUTO: 88.8 FL (ref 79–97)
MONOCYTES # BLD AUTO: 1.3 10*3/MM3 (ref 0.1–0.9)
MONOCYTES NFR BLD AUTO: 13.9 % (ref 5–12)
NEUTROPHILS NFR BLD AUTO: 5.51 10*3/MM3 (ref 1.7–7)
NEUTROPHILS NFR BLD AUTO: 59 % (ref 42.7–76)
NRBC BLD AUTO-RTO: 0.3 /100 WBC (ref 0–0.2)
PHOSPHATE SERPL-MCNC: 4.3 MG/DL (ref 2.5–4.5)
PHOSPHATE SERPL-MCNC: 4.3 MG/DL (ref 2.5–4.5)
PLATELET # BLD AUTO: 165 10*3/MM3 (ref 140–450)
PMV BLD AUTO: 10.9 FL (ref 6–12)
POTASSIUM SERPL-SCNC: 3.7 MMOL/L (ref 3.5–5.2)
RBC # BLD AUTO: 3.92 10*6/MM3 (ref 4.14–5.8)
SODIUM SERPL-SCNC: 136 MMOL/L (ref 136–145)
WBC NRBC COR # BLD AUTO: 9.35 10*3/MM3 (ref 3.4–10.8)

## 2024-12-14 PROCEDURE — 94799 UNLISTED PULMONARY SVC/PX: CPT

## 2024-12-14 PROCEDURE — 84100 ASSAY OF PHOSPHORUS: CPT | Performed by: PHYSICIAN ASSISTANT

## 2024-12-14 PROCEDURE — 94760 N-INVAS EAR/PLS OXIMETRY 1: CPT

## 2024-12-14 PROCEDURE — 83735 ASSAY OF MAGNESIUM: CPT | Performed by: INTERNAL MEDICINE

## 2024-12-14 PROCEDURE — 99232 SBSQ HOSP IP/OBS MODERATE 35: CPT | Performed by: INTERNAL MEDICINE

## 2024-12-14 PROCEDURE — 63710000001 INSULIN LISPRO (HUMAN) PER 5 UNITS: Performed by: NURSE PRACTITIONER

## 2024-12-14 PROCEDURE — 94761 N-INVAS EAR/PLS OXIMETRY MLT: CPT

## 2024-12-14 PROCEDURE — 80069 RENAL FUNCTION PANEL: CPT | Performed by: INTERNAL MEDICINE

## 2024-12-14 PROCEDURE — 94664 DEMO&/EVAL PT USE INHALER: CPT

## 2024-12-14 PROCEDURE — 85025 COMPLETE CBC W/AUTO DIFF WBC: CPT | Performed by: STUDENT IN AN ORGANIZED HEALTH CARE EDUCATION/TRAINING PROGRAM

## 2024-12-14 PROCEDURE — 82948 REAGENT STRIP/BLOOD GLUCOSE: CPT

## 2024-12-14 PROCEDURE — 99291 CRITICAL CARE FIRST HOUR: CPT | Performed by: INTERNAL MEDICINE

## 2024-12-14 PROCEDURE — 25010000002 MICAFUNGIN SODIUM 100 MG RECONSTITUTED SOLUTION 1 EACH VIAL: Performed by: INTERNAL MEDICINE

## 2024-12-14 RX ADMIN — ARFORMOTEROL TARTRATE 15 MCG: 15 SOLUTION RESPIRATORY (INHALATION) at 19:39

## 2024-12-14 RX ADMIN — MICAFUNGIN SODIUM 100 MG: 100 INJECTION, POWDER, LYOPHILIZED, FOR SOLUTION INTRAVENOUS at 08:54

## 2024-12-14 RX ADMIN — LEVOTHYROXINE SODIUM 175 MCG: 0.03 TABLET ORAL at 05:35

## 2024-12-14 RX ADMIN — INSULIN LISPRO 2 UNITS: 100 INJECTION, SOLUTION INTRAVENOUS; SUBCUTANEOUS at 11:52

## 2024-12-14 RX ADMIN — Medication 10 ML: at 08:55

## 2024-12-14 RX ADMIN — INSULIN LISPRO 3 UNITS: 100 INJECTION, SOLUTION INTRAVENOUS; SUBCUTANEOUS at 05:34

## 2024-12-14 RX ADMIN — Medication 10 ML: at 21:29

## 2024-12-14 RX ADMIN — BUDESONIDE 0.5 MG: 0.5 INHALANT ORAL at 19:39

## 2024-12-14 RX ADMIN — BUDESONIDE 0.5 MG: 0.5 INHALANT ORAL at 08:18

## 2024-12-14 RX ADMIN — WHITE PETROLATUM 1 APPLICATION: 1.75 OINTMENT TOPICAL at 09:03

## 2024-12-14 RX ADMIN — ARFORMOTEROL TARTRATE 15 MCG: 15 SOLUTION RESPIRATORY (INHALATION) at 08:18

## 2024-12-14 NOTE — PROGRESS NOTES
"Nephrology progress note    Galileo Cook MD     Current history: Patient is a little more responsive according to the nurse although still confused, nasal Dobbhoff tube in place.  Patient overall is comfortable    Current medication list:  arformoterol, 15 mcg, Nebulization, BID - RT  budesonide, 0.5 mg, Nebulization, BID - RT  insulin lispro, 2-7 Units, Subcutaneous, Q6H  levothyroxine, 175 mcg, Nasogastric, Q AM  micafungin (MYCAMINE) IV, 100 mg, Intravenous, Q24H  midodrine, 10 mg, Nasogastric, Once per day on Monday Wednesday Friday  mineral oil-hydrophilic petrolatum, 1 Application, Topical, Daily  [Held by provider] QUEtiapine, 25 mg, Nasogastric, Nightly  sodium chloride, 10 mL, Intravenous, Q12H         acetaminophen    artificial tears    Calcium Replacement - Follow Nurse / BPA Driven Protocol    dextrose    dextrose    glucagon (human recombinant)    heparin (porcine)    heparin (porcine)    ipratropium-albuterol    Magnesium Standard Dose Replacement - Follow Nurse / BPA Driven Protocol    Phosphorus Replacement - Follow Nurse / BPA Driven Protocol    Polyvinyl Alcohol-Povidone PF    Potassium Replacement - Follow Nurse / BPA Driven Protocol    sodium chloride    sodium chloride    sodium chloride     Physical Exam:  /55 (BP Location: Right arm, Patient Position: Lying)   Pulse 110   Temp 98 °F (36.7 °C) (Oral)   Resp 22   Ht 170.2 cm (67\")   Wt 62.5 kg (137 lb 12.6 oz)   SpO2 96%   BMI 21.58 kg/m²     Intake/Output Summary (Last 24 hours) at 12/14/2024 1348  Last data filed at 12/14/2024 0600  Gross per 24 hour   Intake 712 ml   Output 300 ml   Net 412 ml      General: Patient arousable, comfortable but still confused   cardiac: Tachycardic without a rub  Lungs: Few bilateral rhonchi  Abdomen: Bowel sounds positive nontender soft   Extremities: Shiley catheter out of the groin, no significant lower extremity swelling   skin: No acute rashes noted  : Deferred  Neuro: Arousable but " still confused    Results from last 7 days   Lab Units 12/14/24  0428 12/13/24  0319 12/12/24  0417 12/11/24  0611   SODIUM mmol/L 136 140 139 135*  135*   POTASSIUM mmol/L 3.7 4.1 4.1 3.9  3.9   CHLORIDE mmol/L 98 102 104 102  102   CO2 mmol/L 25.1 24.9 20.8* 19.3*  19.3*   BUN mg/dL 43* 66* 38* 18  18   CREATININE mg/dL 3.00* 5.19* 3.32* 1.55*  1.55*   CALCIUM mg/dL 9.4 10.4 10.2 9.7  9.7   BILIRUBIN mg/dL  --  1.0 1.4* 1.6*   ALK PHOS U/L  --  93 76 94   ALT (SGPT) U/L  --  20 23 36   AST (SGOT) U/L  --  36 39 61*   GLUCOSE mg/dL 242* 266* 172* 236*  236*       Estimated Creatinine Clearance: 17.9 mL/min (A) (by C-G formula based on SCr of 3 mg/dL (H)).    Results from last 7 days   Lab Units 12/14/24  0428 12/13/24  1416 12/13/24 0319 12/12/24  0417   MAGNESIUM mg/dL 2.0  --  3.1* 2.8*   PHOSPHORUS mg/dL 4.3  4.3 1.6* 2.2* 2.4*             Results from last 7 days   Lab Units 12/14/24  0428 12/13/24 0319 12/12/24  0417 12/11/24  0611 12/10/24  0620   WBC 10*3/mm3 9.35 8.98 7.97 9.57 5.77   HEMOGLOBIN g/dL 10.6* 11.5* 10.6* 11.4* 9.1*   PLATELETS 10*3/mm3 165 182 146 148 124*             Imaging Results (Last 24 Hours)       ** No results found for the last 24 hours. **             Assessment/plan:      - ESRD, was on home dialysis. Off CRRT.  Off pressors, hemodynamically stable, dialysis yesterday.  Will evaluate in a.m. plan is probably dialyze on Monday but will again evaluate volume status and electrolytes tomorrow.  Use left upper extremity AV fistula.  Shiley catheter out     - Volume overload, much better now.         - Aspiration pneumonia, and possibly recurrent aspiration, per pulmonary.      - Type 2 diabetes with complications.     - Hypotension blood pressure is acceptable now, off pressors although on ProAmatine.     - Anemia of chronic kidney disease, hemoglobin 10.6 with goal greater than 10, on RONALD as outpatient.  Getting Epogen while here.     - Altered mentation.   Multifactorial.    Thank you very much, please contact me at [phone]    Galileo Cook MD

## 2024-12-14 NOTE — PROGRESS NOTES
[facility]     Progress Note    Patient Name: Nelson Wang  : 1945  MRN: [id number]  Primary Care Physician:  Domingo Bravo MD  Date of admission: 2024    Subjective   Subjective     Chief Complaint:   Patient is critically ill status post cardiac arrest with hemoptysis, hypoxic respiratory failure, likely hypoxic arrest, with acute hypoxic respiratory failure, acute on chronic congestive diastolic heart failure with EF of 50 to 60%, grade 1 diastolic dysfunction, acute cardiogenic pulmonary edema loculated right-sided pleural effusion, ESRD on hemodialysis, GASTON with home CPAP.    2 L of oxygen  Dialyzed yesterday  Looks like he is beginning to wake up a little bit more and trying to regard  Trying to mouth words but not answering questions for me  EEG with no seizures.  Did have some slowing consistent with encephalopathy  Currently not on any pressors  Tolerating tube feeds  Having to be suctioned once or twice a day  Remains on vancomycin and micafungin  Repeat blood cultures negative      Objective   Objective     Vitals:   Temp:  [97.8 °F (36.6 °C)-98.3 °F (36.8 °C)] 98 °F (36.7 °C)  Heart Rate:  [] 99  Resp:  [16-29] 26  BP: ()/() 140/54  Flow (L/min) (Oxygen Therapy):  [2] 2    Physical Exam   Vital Signs Reviewed  Critically ill, more awake but still confused, somewhat better than previous days  HEENT:  PERRL.  OP clear  Chest:  poor aeration, diminished breath sounds, resonant to percussion b/l, no increased work of breathing noted, decreasing subcutaneous emphysema right chest  CV: RRR, no MGR, pulses 2+, equal  Abd:  Soft, NT, ND, + BS, no HSM  EXT:  no clubbing, no cyanosis, No BLE edema, left arm AV fistula in place  Neuro: Grimaces to pain moves all extremities, opens eyes to name, trying to regard, not answering questions, cranial nerves intact, does open eyes to name  skin: No rashes or lesions noted      Result Review    Result Review:  I have personally  reviewed the results from the time of this admission to 12/14/2024 15:07 EST and agree with these findings:  [x]  Laboratory  [x]  Microbiology  [x]  Radiology  [x]  EKG/Telemetry   [x]  Cardiology/Vascular   []  Pathology  []  Old records  []  Other:  Most notable findings include:       Lab 12/14/24  0428 12/13/24  1416 12/13/24  0319 12/12/24  0417 12/11/24  0611 12/11/24  0431 12/11/24  0008 12/10/24  1814 12/10/24  1221 12/10/24  0620 12/09/24  1220 12/09/24  0622 12/08/24  0410 12/07/24  2346   WBC 9.35  --  8.98 7.97 9.57  --   --   --   --  5.77  --  5.23  --  6.95   HEMOGLOBIN 10.6*  --  11.5* 10.6* 11.4*  --   --   --   --  9.1*  --  8.8*  --  9.3*   HEMATOCRIT 34.8*  --  39.2 35.8* 38.1  --   --   --   --  30.5*  --  29.5*  --  30.7*   PLATELETS 165  --  182 146 148  --   --   --   --  124*  --  115*  --  143   SODIUM 136  --  140 139 135*  135*  --  137 138 138 136   < > 136   < > 136   POTASSIUM 3.7  --  4.1 4.1 3.9  3.9  --  3.8 3.9 3.9 4.1   < > 4.4   < > 4.6   CHLORIDE 98  --  102 104 102  102  --  105 105 108* 103   < > 102   < > 104   CO2 25.1  --  24.9 20.8* 19.3*  19.3*  --  21.5* 22.7 21.5* 20.3*   < > 20.9*   < > 19.7*   BUN 43*  --  66* 38* 18  18  --  17 16 17 17   < > 20   < > 10   CREATININE 3.00*  --  5.19* 3.32* 1.55*  1.55* 1.49* 1.61* 1.73* 1.68* 1.75*   < > 2.16*   < > 1.39*   GLUCOSE 242*  --  266* 172* 236*  236*  --  220* 181* 148* 188*   < > 136*   < > 138*   CALCIUM 9.4  --  10.4 10.2 9.7  9.7  --  9.2 8.9 8.8 8.6   < > 9.1   < > 8.3*   PHOSPHORUS 4.3  4.3 1.6* 2.2* 2.4* 2.8  --  2.4* 2.6 2.6 2.7   < > 4.0   < > 4.4   TOTAL PROTEIN  --   --  8.0 7.9 9.0*  --   --   --   --   --   --   --   --   --    ALBUMIN 3.4*  --  3.9 3.9 3.9  3.9  --  3.5 3.3* 3.0* 3.1*   < > 3.2*   < > 2.8*   GLOBULIN  --   --  4.1 4.0 5.1  --   --   --   --   --   --   --   --   --     < > = values in this interval not displayed.     XR Chest 1 View    Result Date: 12/13/2024  XR CHEST 1 VW Date  of Exam: 12/13/2024 7:47 AM EST Indication: C[place]rak placement Comparison: 12/11/2024 chest radiograph Findings: Feeding tube removed. Lung volumes are low. Cardiomediastinal silhouette stable. Aortic atherosclerotic disease. Blunted costophrenic angles suspicious for small effusions. Nonspecific bibasilar opacities which could reflect atelectasis or airspace disease similar to previous comparison. There is right greater than left chest wall subcutaneous emphysema similar to prior. No visualized pneumothorax. Degenerative related osseous change.     Impression: Impression: Removed feeding tube, otherwise no significant radiographic change since 12/11/2024. Electronically Signed: Lorne Bustamante MD  12/13/2024 8:06 AM EST  Workstation ID: [id number]     Results for orders placed during the hospital encounter of 12/05/24    Adult Transthoracic Echo Complete W/ Cont if Necessary Per Protocol    Interpretation Summary    The quality of the study is limited due to limited views obtained    Left ventricular systolic function is normal. Calculated left ventricular EF = 57.8%    The right ventricular cavity is mildly dilated.    The left atrial cavity is mildly dilated.    There is mild calcification of the aortic valve no evidence of vegetation seen    Blood cultures with Candida and Staph epidermidis  Repeat blood cultures negative    EEG with no seizures.  Did have some slowing consistent with encephalopathy    CT Head Without Contrast    Result Date: 12/13/2024  CT HEAD WO CONTRAST Date of Exam: 12/13/2024 8:44 AM EST Indication: ams not improving. Comparison: CT head 12/1/2024 Technique: Axial CT images were obtained of the head without contrast administration.  Reconstructed coronal and sagittal images were also obtained. Automated exposure control and iterative construction methods were used. Findings: There is no evidence of acute infarction. There there is no acute intracranial hemorrhage. There are no extra-axial  collections. Ventricles and CSF spaces are symmetric. No mass effect nor hydrocephalus. Some periventricular white matter changes are noted. There is cerebral atrophy which appears age-related.  Some fluid opacification of the right mastoid air cells is noted.  Right posterior scalp hematoma shows some interval decrease in size. Osseous structures and orbits appear intact.     Impression: Impression: No acute intracranial process. Improving right posterior scalp hematoma. Electronically Signed: Margaret Hammond MD  12/13/2024 9:05 AM EST  Workstation ID: [id number]    CT Head Without Contrast    Result Date: 12/1/2024  CT HEAD WO CONTRAST Date of Exam: 12/1/2024 6:17 PM EST Indication: Head injury headache. Comparison: MRI 11/15/2024 Technique: Axial CT images were obtained of the head without contrast administration.  Reconstructed coronal and sagittal images were also obtained. Automated exposure control and iterative construction methods were used. Findings: There is mild generalized parenchymal volume loss. There is no acute infarct or hemorrhage. No abnormal extra-axial fluid collection is identified. There is no mass effect or hydrocephalus. There is a small scalp hematoma overlying the right parietal bone at the vertex. This measures 1.4 x 0.8 cm. No underlying skull fracture. There is partial opacification of the right mastoid air cells. Left mastoid air cells are clear. Visualized paranasal sinuses are clear. Globes and orbits are within normal limits     Impression: Impression: 1. Small hematoma within the scalp overlying the right parietal bone at the vertex. No underlying skull fracture. 2. No acute intracranial abnormality. 3. Partial opacification of the right mastoid air cells unchanged from 11/15/2024 Electronically Signed: Stephan Dey MD  12/1/2024 6:27 PM EST  Workstation ID: [id number]         Assessment & Plan   Assessment / Plan       Active Hospital Problems:  Active Hospital Problems     Diagnosis     **Hypoxia    Status post cardiac arrest secondary to hypoxia  Acute hypoxemic and hypercapnic respiratory failure require mechanical ventilation  Hemoptysis  Concern for intraparenchymal bleed   Status post thoracentesis  Candidemia  Acute on chronic hypoxic respiratory failure  Acute cardiogenic pulmonary edema  End-stage renal disease on dialysis leading to heart failure  Acute diastolic heart failure with EF of 56-60  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  Wean O2 to keep SPO2 greater than 90%  Continue frequent suctioning  Continue midodrine 10 mg 3 times daily.   Appreciate nephrology input.  Plans to dialyze today via fistula  Mental status  Slowly improving secondary to improvement in encephalopathy.    EEG was consistent with encephalopathy/generalized slowing.  Keppra was stopped yesterday.  I did check a head CT today as he was too unstable to get one earlier this hospitalization.  It did not show any acute or obvious pathology.  If stagnates or no improvement in the over the next couple days, consider brain MRI  On day 8 of micafungin for Candida fungemia.  Repeat cultures negative  Completed vancomycin for Staph epidermidis bacteremia x  6 weeks of antibiotics for Staph epidermidis discitis.    Transfuse for hemoglobin less than 7  Continue  Brovana, Pulmicort and add DuoNeb.  Bronchoscopy cultures negative so far.  Tube feeds at goal per dietitian recommendations  Aspiration precautions  DNR for now.  Family okay with intubation    Okay to transfer out of ICU    VTE Prophylaxis:  Pharmacologic & mechanical VTE prophylaxis orders are present.    CODE STATUS:   Level Of Support Discussed With: Next of Kin (If No Surrogate)  Code Status (Patient has no pulse and is not breathing): No  CPR (Do Not Attempt to Resuscitate)  Medical Interventions (Patient has pulse or is breathing): Full Support      Patient is critically ill in ICU with status post cardiac arrest, hypoxic arrest, hemoptysis, hypovolemic shock, ESRD, respiratory failure, altered mental status, bacteremia and fungemia. I spent 30 minutes of critical care time excluding any procedure notes, spent in review, analysis, obtaining history and physical, formulating care plan, and I led multi-disciplinary critical care rounds with bedside nurse, respiratory therapist, clinical pharmacist and other allied services. I have discussed the case with primary service and other consultants as well.     Electronically signed by Scottie Valentin MD, 12/14/2024, 15:07 EST.

## 2024-12-14 NOTE — PROGRESS NOTES
[facility]   Hospitalist Progress Note  Date: 2024  Patient Name: Nelson Wang  : 1945  MRN: [id number]  Date of admission: 2024  Room/Bed: [facility]      Subjective   Subjective     Chief Complaint: Shortness of breath    Summary:    Nelson Wang is a 79 y.o. 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  Patient was started on supplemental oxygen to maintain his oxygen saturations.  He was given a dose of Bumex overnight.  His chest x-ray was consistent with bilateral pleural effusions.  Pulmonology was consulted for the bilateral pleural effusions and and nephrology consulted for possible volume overload.  Pulmonology was performing a thoracentesis this morning.  Thoracentesis was showing bloody fluid.  Patient suffered CODE BLUE. Likely bleeding into his airways causing hypoxic arrest.  He received CPR for 6 minutes and achieved ROSC.  He was transferred to the ICU, intubated and on blood pressure support.  Bronchoscopy was showing blood clots in his airways.  Patient was started on CRRT.  Patient's medical status is tenuous.  He is currently on 1 pressor. CRRT is removing fluid at a slow rate.  Patient started on micafungin on 2024.  Patient extubated on 2024.  Patient on high flow nasal cannula 30 L 25%.  Slowly weaning down on supplemental oxygen, breathing more comfortably.  Poor mental status still waxing and waning.  Patient has been transitioned over to hemodialysis, blood pressure tolerating.    Interval Followup:   Patient's mental status has improved.  Able to discussed with patient's daughter.  Patient was able to speak a couple of words with his daughter.  Will continue to hold on ordering MRI at this time.  Will transfer patient out of the ICU today and have a telemetry bed.    Objective   Objective     Vitals:    Temp:  [97.8 °F (36.6 °C)-98.3 °F (36.8 °C)] 98 °F (36.7 °C)  Heart Rate:  [] 109  Resp:  [16-31] 27  BP: ()/() 133/58  Flow (L/min) (Oxygen Therapy):  [2] 2    Physical Exam   General: Moves spontaneously, appears to be making eye contact, opening eyes spontaneously  Cardiovascular: RRR, no murmurs   Pulmonary: nasal cannula in place, coarse breath sounds  Gastrointestinal: Abdomen is soft, nondistended  Musculoskeletal: No lower extremity edema  Neuro: Unable to assess mental status, spontaneously moving all 4 extremities    Result Review    Result Review:  I have personally reviewed these results:  [x]  Laboratory      Lab 12/14/24 0428 12/13/24 0319 12/12/24  0417 12/11/24  0611 12/11/24  0431 12/10/24  0620 12/09/24  0622   WBC 9.35 8.98 7.97   < >  --  5.77 5.23   HEMOGLOBIN 10.6* 11.5* 10.6*   < >  --  9.1* 8.8*   HEMATOCRIT 34.8* 39.2 35.8*   < >  --  30.5* 29.5*   PLATELETS 165 182 146   < >  --  124* 115*   NEUTROS ABS 5.51 5.25 4.56   < >  --  3.57 3.82   IMMATURE GRANS (ABS) 0.14* 0.15* 0.12*   < >  --  0.05 0.03   LYMPHS ABS 1.90 1.95 1.48   < >  --  1.14 0.76   MONOS ABS 1.30* 1.24* 1.50*   < >  --  0.71 0.55   EOS ABS 0.43* 0.36 0.23   < >  --  0.24 0.03   MCV 88.8 92.7 89.3   < >  --  91.3 91.6   LACTATE  --   --   --   --  2.2* 1.3 1.1    < > = values in this interval not displayed.         Lab 12/14/24 0428 12/13/24  1416 12/13/24 0319 12/12/24 0417   SODIUM 136  --  140 139   POTASSIUM 3.7  --  4.1 4.1   CHLORIDE 98  --  102 104   CO2 25.1  --  24.9 20.8*   ANION GAP 12.9  --  13.1 14.2   BUN 43*  --  66* 38*   CREATININE 3.00*  --  5.19* 3.32*   EGFR 20.6*  --  10.7* 18.3*   GLUCOSE 242*  --  266* 172*   CALCIUM 9.4  --  10.4 10.2   MAGNESIUM 2.0  --  3.1* 2.8*   PHOSPHORUS 4.3  4.3 1.6* 2.2* 2.4*         Lab 12/14/24  0428 12/13/24  0319 12/12/24  0417 12/11/24  0611   TOTAL PROTEIN  --  8.0 7.9 9.0*   ALBUMIN 3.4* 3.9 3.9 3.9  3.9   GLOBULIN  --  4.1 4.0 5.1   ALT  (SGPT)  --  20 23 36   AST (SGOT)  --  36 39 61*   BILIRUBIN  --  1.0 1.4* 1.6*   ALK PHOS  --  93 76 94                         Lab 12/11/24  0431 12/08/24  0700   PH, ARTERIAL 7.302* 7.309*   PCO2, ARTERIAL 45.6* 41.1   PO2 ART 91.3 69.4*   O2 SATURATION ART 96.1 91.9*   FIO2 32 35   HCO3 ART 22.5 20.6*   BASE EXCESS ART -4.0* -5.3*     Brief Urine Lab Results  (Last result in the past 365 days)        Color   Clarity   Blood   Leuk Est   Nitrite   Protein   CREAT   Urine HCG        10/27/24 1417 Yellow   Cloudy   Negative   Trace   Negative   >=300 mg/dL (3+)                 [x]  Microbiology   Microbiology Results (last 10 days)       Procedure Component Value - Date/Time    Blood Culture - Blood, Blood, Central Line [[id number]]  (Normal) Collected: 12/07/24 1023    Lab Status: Final result Specimen: Blood, Central Line Updated: 12/12/24 1030     Blood Culture No growth at 5 days    Blood Culture - Blood, Blood, Arterial Line [[id number]]  (Normal) Collected: 12/07/24 1023    Lab Status: Final result Specimen: Blood, Arterial Line Updated: 12/12/24 1046     Blood Culture No growth at 5 days    Pneumonia Panel - Lavage, Lung, Right Lower Lobe [[id number]]  (Normal) Collected: 12/06/24 1719    Lab Status: Final result Specimen: Lavage from Lung, Right Lower Lobe Updated: 12/06/24 1859     Escherichia coli PCR Not Detected     Acinetobacter calcoaceticus-baumannii complex PCR Not Detected     Enterobacter cloacae PCR Not Detected     Klebsiella oxytoca PCR Not Detected     Klebsiella pneumoniae group PCR Not Detected     Klebsiella aerogenes PCR Not Detected     Moraxella catarrhalis PCR Not Detected     Proteus species PCR Not Detected     Pseudomonas aeroginosa PCR Not Detected     Serratia marcescens PCR Not Detected     Staphylococcus aureus PCR Not Detected     Streptococcus pyogenes PCR Not Detected     Haemophilus influenzae PCR Not Detected     Streptococcus agalactiae PCR Not Detected     Streptococcus  pneumoniae PCR Not Detected     Chlamydophila pneumoniae PCR Not Detected     Legionella pneumophilia PCR Not Detected     Mycoplasma pneumo by PCR Not Detected     ADENOVIRUS, PCR Not Detected     CTX-M Gene N/A     IMP Gene N/A     KPC Gene N/A     mecA/C and MREJ Gene N/A     NDM Gene N/A     OXA-48-like Gene N/A     VIM Gene N/A     Coronavirus Not Detected     Human Metapneumovirus Not Detected     Human Rhinovirus/Enterovirus Not Detected     Influenza A PCR Not Detected     Influenza B PCR Not Detected     RSV, PCR Not Detected     Parainfluenza virus PCR Not Detected    BAL Culture, Quantitative - Lavage, Bronchus [[id number]] Collected: 12/06/24 1719    Lab Status: Final result Specimen: Lavage from Bronchus Updated: 12/08/24 1013     BAL Culture >100,000 CFU/mL Normal respiratory lashae. No S. aureus or Pseudomonas aeruginosa detected. Final report.     Gram Stain No organisms seen    Anaerobic Culture - Pleural Fluid, Pleural Cavity [[id number]]  (Normal) Collected: 12/06/24 1235    Lab Status: Final result Specimen: Pleural Fluid from Pleural Cavity Updated: 12/11/24 0657     Anaerobic Culture No anaerobes isolated at 5 days    AFB Culture - Body Fluid, Pleural Cavity [[id number]] Collected: 12/06/24 1233    Lab Status: Preliminary result Specimen: Body Fluid from Pleural Cavity Updated: 12/13/24 1245     AFB Culture No AFB isolated at 1 week     AFB Stain Few (2+) WBCs seen      No organisms seen      No acid fast bacilli seen on direct smear    Body Fluid Culture - Body Fluid, Pleural Cavity [[id number]] Collected: 12/06/24 1233    Lab Status: Final result Specimen: Body Fluid from Pleural Cavity Updated: 12/09/24 0952     Body Fluid Culture No growth at 3 days     Gram Stain Few (2+) WBCs seen      No organisms seen    Fungus Culture - Body Fluid, Pleural Cavity [[id number]] Collected: 12/06/24 1233    Lab Status: Preliminary result Specimen: Body Fluid from Pleural Cavity Updated: 12/13/24 1245     Fungus  Culture No fungus isolated at 1 week    Blood Culture - Blood, Arm, Left [[id number]]  (Abnormal)  (Susceptibility) Collected: 12/05/24 1755    Lab Status: Final result Specimen: Blood from Arm, Left Updated: 12/11/24 0729     Blood Culture Staphylococcus epidermidis     Isolated from Aerobic Bottle     Blood Culture Streptococcus mutans     Isolated from Anaerobic Bottle     Gram Stain Aerobic Bottle Gram positive cocci in clusters      Anaerobic Bottle Gram positive cocci in chains    Narrative:            Susceptibility        Staphylococcus epidermidis      RITU      Oxacillin Resistant      Vancomycin Susceptible                       Susceptibility        Streptococcus mutans      RITU      Ceftriaxone Susceptible      Penicillin G Susceptible      Vancomycin Susceptible                           Blood Culture - Blood, Arm, Left [[id number]]  (Abnormal) Collected: 12/05/24 1755    Lab Status: Final result Specimen: Blood from Arm, Left Updated: 12/11/24 0729     Blood Culture Staphylococcus epidermidis     Isolated from Aerobic and Anaerobic Bottles     Blood Culture Streptococcus mutans     Isolated from Anaerobic Bottle     Blood Culture Corynebacterium species     Isolated from Anaerobic Bottle     Gram Stain Aerobic Bottle Gram positive cocci in clusters      Anaerobic Bottle Gram positive cocci in chains and clusters    Narrative:      Refer to previous blood culture collected on 12/05/2024 1755 for MICs  Corynebacterium is probable contaminant requires clinical correlation, susceptibility not performed unless requested by physician.       Blood Culture ID, PCR - Blood, Arm, Left [[id number]]  (Abnormal) Collected: 12/05/24 1755    Lab Status: Final result Specimen: Blood from Arm, Left Updated: 12/06/24 1617     BCID, PCR Staph spp, not aureus or lugdunensis. Identification by BCID2 PCR.     BCID, PCR 2 Streptococcus spp, not A, B, or pneumoniae. Identification by BCID2 PCR.     BOTTLE TYPE Aerobic Bottle     Narrative:      Staph Sp mecA/C    Blood Culture ID, PCR - Blood, Arm, Left [[id number]]  (Abnormal) Collected: 12/05/24 1755    Lab Status: Final result Specimen: Blood from Arm, Left Updated: 12/07/24 0943     BCID, PCR Staph spp, not aureus or lugdunensis. Identification by BCID2 PCR.     BCID, PCR 2 Streptococcus spp, not A, B, or pneumoniae. Identification by BCID2 PCR.     BOTTLE TYPE Anaerobic Bottle    Narrative:      S.epi with mecA/C    Blood Culture ID, PCR - Blood, Arm, Left [[id number]]  (Abnormal) Collected: 12/05/24 1755    Lab Status: Final result Specimen: Blood from Arm, Left Updated: 12/07/24 0913     BCID, PCR Streptococcus spp, not A, B, or pneumoniae. Identification by BCID2 PCR.     BCID, PCR 2 Candida glabrata. Identification by BCID2 PCR.     BOTTLE TYPE Anaerobic Bottle    Narrative:      Infectious disease consultation is highly recommended to rule out distant foci of infection.          [x]  Radiology  CT Head Without Contrast    Result Date: 12/13/2024  Impression: No acute intracranial process. Improving right posterior scalp hematoma. Electronically Signed: Margaret Hammond MD  12/13/2024 9:05 AM EST  Workstation ID: [id number]    XR Chest 1 View    Result Date: 12/13/2024  Impression: Removed feeding tube, otherwise no significant radiographic change since 12/11/2024. Electronically Signed: Lorne Bustamante MD  12/13/2024 8:06 AM EST  Workstation ID: [id number]    XR Abdomen KUB    Result Date: 12/11/2024  Impression: 1.Lines and tubes as above. 2.Indeterminate bowel gas pattern. 3.Subcutaneous emphysema overlies the right lower chest. Bilateral pleural effusions. Electronically Signed: Carlos Joiner MD  12/11/2024 7:27 AM EST  Workstation ID: [id number]    XR Chest 1 View    Result Date: 12/11/2024  1.Interval removal of right chest tube without evidence of pneumothorax. 2.Persistent pleural effusions with bibasilar consolidations, right worse than left. 3.Subcutaneous emphysema.  Electronically Signed: Cristian Tiwari MD  12/11/2024 5:15 AM EST  Workstation ID: [id number]    XR Chest 1 View    Result Date: 12/10/2024  1.No evidence of pneumothorax. 2.Improved aeration at the left lung base. 3.Persistent infiltrate at the right lung base. 4.Subcutaneous emphysema. Electronically Signed: Cristian Tiwari MD  12/10/2024 2:10 AM EST  Workstation ID: [id number]    XR Chest 1 View    Result Date: 12/9/2024  Impression: 1. No change in subcutaneous emphysema. 2. Slight improvement in patchy bilateral airspace disease. 3. No change in right thoracostomy tube. No definite pneumothorax identified. Electronically Signed: Stephan Dey MD  12/9/2024 5:18 PM EST  Workstation ID: [id number]    XR Chest 1 View    Result Date: 12/9/2024  Impression: 1.Interval extubation. 2.Right-sided chest tube in unchanged position. No definite pneumothorax. 3.Patchy right greater than left airspace disease, similar to the prior examination. 4.Subcutaneous emphysema throughout the chest, right greater than left. This has mildly improved from the prior exam. Electronically Signed: Reynaldo Rangel  12/9/2024 9:35 AM EST  Workstation ID: [id number]    XR Chest 1 View    Result Date: 12/8/2024  1.No significant interval change. 2.Right-sided chest tube without evidence of pneumothorax. 3.Bilateral infiltrates, right worse than left. Electronically Signed: Cristian Tiwari MD  12/8/2024 6:11 AM EST  Workstation ID: [id number]    XR Chest 1 View    Result Date: 12/7/2024  1.Right chest tube has been pulled back slightly, but the side ports appear to be within the pleural space. No definite pneumothorax. 2.Extensive subcutaneous gas in the chest wall. 3.Stable bilateral infiltrates. Electronically Signed: Cristian Tiwari MD  12/7/2024 8:31 PM EST  Workstation ID: [id number]    XR Abdomen KUB    Result Date: 12/7/2024  Impression: Weighted tip feeding catheter projects over the stomach. Electronically Signed: Claus Sánchez MD  12/7/2024 1:08  PM EST  Workstation ID: [id number]    XR Chest 1 View    Result Date: 12/7/2024  Impression: 1.Endotracheal tube in good position. 2.Right-sided chest tube in place. No pneumothorax identified. 3.Diffuse bilateral subcutaneous emphysema appears slightly improved. 4.Patchy airspace disease throughout right lung appears unchanged. Electronically Signed: Winston Reyes MD  12/7/2024 9:25 AM EST  Workstation ID: [id number]   []  EKG/Telemetry   []  Cardiology/Vascular   []  Pathology  []  Old records  []  Other:    Assessment & Plan   Assessment / Plan     Status postcardiac arrest, secondary to hypoxia  Acute hypoxic/hypercapnic respiratory failure requiring mechanical ventilation  Hemoptysis  Altered mental status, concern for seizures  End-stage renal disease on dialysis  Acute on chronic diastolic heart failure  Staph epidermidis cystitis on vancomycin  Type 2 diabetes  Atrial fibrillation on Eliquis  Candidemia    Plan:  Patient remains admitted to the ICU for further care and management  Will transfer to telemetry bed today  Pulmonary critical care, nephrology consulted, appreciate assistance  For concerns of seizure-like activity, started empirically on Keppra.  EEG returned with no seizure-like activity.  Will hold further doses of Keppra at this time  Try and hold any sedating medications as able, patient with several risk factors for encephalopathy, delirium  Patient remains on dialysis schedule per nephrology  Patient continues on micafungin for Candida fungemia  Patient remains on vancomycin Staph epidermidis bacteremia.    Patient's blood cultures from 12/7/2024 remain no growth to date, x5 days  Continue NIPPV as tolerated  Continue core track and tube feeds.  Will continue ongoing discussions with family about PEG tube.  Discussions thus far with family indicate as long as patient's mentation is improving they could be agreeable to PEG tube in the near future.    Discussed with RN.  Discussed with  intensivist, discussed with family over the phone    VTE Prophylaxis:  Pharmacologic & mechanical VTE prophylaxis orders are present.        CODE STATUS:   Level Of Support Discussed With: Next of Kin (If No Surrogate)  Code Status (Patient has no pulse and is not breathing): No CPR (Do Not Attempt to Resuscitate)  Medical Interventions (Patient has pulse or is breathing): Full Support

## 2024-12-15 LAB
ALBUMIN SERPL-MCNC: 3.2 G/DL (ref 3.5–5.2)
ALBUMIN/GLOB SERPL: 0.8 G/DL
ALP SERPL-CCNC: 84 U/L (ref 39–117)
ALT SERPL W P-5'-P-CCNC: 17 U/L (ref 1–41)
ANION GAP SERPL CALCULATED.3IONS-SCNC: 19 MMOL/L (ref 5–15)
ANISOCYTOSIS BLD QL: NORMAL
AST SERPL-CCNC: 31 U/L (ref 1–40)
BASOPHILS # BLD AUTO: 0.05 10*3/MM3 (ref 0–0.2)
BASOPHILS NFR BLD AUTO: 0.6 % (ref 0–1.5)
BILIRUB SERPL-MCNC: 0.9 MG/DL (ref 0–1.2)
BUN SERPL-MCNC: 65 MG/DL (ref 8–23)
BUN/CREAT SERPL: 13.3 (ref 7–25)
CALCIUM SPEC-SCNC: 9.7 MG/DL (ref 8.6–10.5)
CHLORIDE SERPL-SCNC: 94 MMOL/L (ref 98–107)
CO2 SERPL-SCNC: 21 MMOL/L (ref 22–29)
CREAT SERPL-MCNC: 4.9 MG/DL (ref 0.76–1.27)
DEPRECATED RDW RBC AUTO: 64.8 FL (ref 37–54)
EGFRCR SERPLBLD CKD-EPI 2021: 11.4 ML/MIN/1.73
EOSINOPHIL # BLD AUTO: 0.43 10*3/MM3 (ref 0–0.4)
EOSINOPHIL NFR BLD AUTO: 5.2 % (ref 0.3–6.2)
ERYTHROCYTE [DISTWIDTH] IN BLOOD BY AUTOMATED COUNT: 22.1 % (ref 12.3–15.4)
GLOBULIN UR ELPH-MCNC: 4.1 GM/DL
GLUCOSE BLDC GLUCOMTR-MCNC: 141 MG/DL (ref 70–99)
GLUCOSE BLDC GLUCOMTR-MCNC: 152 MG/DL (ref 70–99)
GLUCOSE BLDC GLUCOMTR-MCNC: 183 MG/DL (ref 70–99)
GLUCOSE BLDC GLUCOMTR-MCNC: 185 MG/DL (ref 70–99)
GLUCOSE SERPL-MCNC: 178 MG/DL (ref 65–99)
HCT VFR BLD AUTO: 36.6 % (ref 37.5–51)
HGB BLD-MCNC: 10.7 G/DL (ref 13–17.7)
IMM GRANULOCYTES # BLD AUTO: 0.08 10*3/MM3 (ref 0–0.05)
IMM GRANULOCYTES NFR BLD AUTO: 1 % (ref 0–0.5)
LYMPHOCYTES # BLD AUTO: 1.95 10*3/MM3 (ref 0.7–3.1)
LYMPHOCYTES NFR BLD AUTO: 23.5 % (ref 19.6–45.3)
MAGNESIUM SERPL-MCNC: 2.2 MG/DL (ref 1.6–2.4)
MCH RBC QN AUTO: 27.5 PG (ref 26.6–33)
MCHC RBC AUTO-ENTMCNC: 29.2 G/DL (ref 31.5–35.7)
MCV RBC AUTO: 94.1 FL (ref 79–97)
MONOCYTES # BLD AUTO: 1.34 10*3/MM3 (ref 0.1–0.9)
MONOCYTES NFR BLD AUTO: 16.1 % (ref 5–12)
NEUTROPHILS NFR BLD AUTO: 4.46 10*3/MM3 (ref 1.7–7)
NEUTROPHILS NFR BLD AUTO: 53.6 % (ref 42.7–76)
NRBC BLD AUTO-RTO: 0 /100 WBC (ref 0–0.2)
PHOSPHATE SERPL-MCNC: 4.8 MG/DL (ref 2.5–4.5)
PLATELET # BLD AUTO: 153 10*3/MM3 (ref 140–450)
PMV BLD AUTO: 10.8 FL (ref 6–12)
POLYCHROMASIA BLD QL SMEAR: NORMAL
POTASSIUM SERPL-SCNC: 4.2 MMOL/L (ref 3.5–5.2)
PROT SERPL-MCNC: 7.3 G/DL (ref 6–8.5)
RBC # BLD AUTO: 3.89 10*6/MM3 (ref 4.14–5.8)
SMALL PLATELETS BLD QL SMEAR: ADEQUATE
SODIUM SERPL-SCNC: 134 MMOL/L (ref 136–145)
WBC MORPH BLD: NORMAL
WBC NRBC COR # BLD AUTO: 8.31 10*3/MM3 (ref 3.4–10.8)

## 2024-12-15 PROCEDURE — 82948 REAGENT STRIP/BLOOD GLUCOSE: CPT

## 2024-12-15 PROCEDURE — 83735 ASSAY OF MAGNESIUM: CPT | Performed by: INTERNAL MEDICINE

## 2024-12-15 PROCEDURE — 94799 UNLISTED PULMONARY SVC/PX: CPT

## 2024-12-15 PROCEDURE — 63710000001 INSULIN LISPRO (HUMAN) PER 5 UNITS: Performed by: NURSE PRACTITIONER

## 2024-12-15 PROCEDURE — 25010000002 MICAFUNGIN SODIUM 100 MG RECONSTITUTED SOLUTION 1 EACH VIAL: Performed by: INTERNAL MEDICINE

## 2024-12-15 PROCEDURE — 80053 COMPREHEN METABOLIC PANEL: CPT | Performed by: INTERNAL MEDICINE

## 2024-12-15 PROCEDURE — 93005 ELECTROCARDIOGRAM TRACING: CPT | Performed by: PHYSICIAN ASSISTANT

## 2024-12-15 PROCEDURE — 99232 SBSQ HOSP IP/OBS MODERATE 35: CPT | Performed by: INTERNAL MEDICINE

## 2024-12-15 PROCEDURE — 93010 ELECTROCARDIOGRAM REPORT: CPT | Performed by: STUDENT IN AN ORGANIZED HEALTH CARE EDUCATION/TRAINING PROGRAM

## 2024-12-15 PROCEDURE — 85007 BL SMEAR W/DIFF WBC COUNT: CPT | Performed by: STUDENT IN AN ORGANIZED HEALTH CARE EDUCATION/TRAINING PROGRAM

## 2024-12-15 PROCEDURE — 85025 COMPLETE CBC W/AUTO DIFF WBC: CPT | Performed by: STUDENT IN AN ORGANIZED HEALTH CARE EDUCATION/TRAINING PROGRAM

## 2024-12-15 PROCEDURE — 84100 ASSAY OF PHOSPHORUS: CPT | Performed by: INTERNAL MEDICINE

## 2024-12-15 PROCEDURE — 99233 SBSQ HOSP IP/OBS HIGH 50: CPT | Performed by: INTERNAL MEDICINE

## 2024-12-15 PROCEDURE — 82948 REAGENT STRIP/BLOOD GLUCOSE: CPT | Performed by: NURSE PRACTITIONER

## 2024-12-15 PROCEDURE — 94664 DEMO&/EVAL PT USE INHALER: CPT

## 2024-12-15 RX ORDER — OXYCODONE HYDROCHLORIDE 5 MG/1
2.5 TABLET ORAL ONCE AS NEEDED
Status: COMPLETED | OUTPATIENT
Start: 2024-12-15 | End: 2024-12-15

## 2024-12-15 RX ORDER — BISACODYL 5 MG/1
5 TABLET, DELAYED RELEASE ORAL DAILY PRN
Status: DISCONTINUED | OUTPATIENT
Start: 2024-12-15 | End: 2024-12-15

## 2024-12-15 RX ORDER — AMOXICILLIN 250 MG
2 CAPSULE ORAL 2 TIMES DAILY PRN
Status: DISCONTINUED | OUTPATIENT
Start: 2024-12-15 | End: 2024-12-22

## 2024-12-15 RX ORDER — TROLAMINE SALICYLATE 10 G/100G
1 CREAM TOPICAL AS NEEDED
Status: DISCONTINUED | OUTPATIENT
Start: 2024-12-15 | End: 2025-01-08 | Stop reason: HOSPADM

## 2024-12-15 RX ORDER — BISACODYL 10 MG
10 SUPPOSITORY, RECTAL RECTAL DAILY PRN
Status: DISCONTINUED | OUTPATIENT
Start: 2024-12-15 | End: 2024-12-22

## 2024-12-15 RX ORDER — ACETAMINOPHEN 325 MG/1
650 TABLET ORAL EVERY 6 HOURS
Status: DISCONTINUED | OUTPATIENT
Start: 2024-12-15 | End: 2024-12-21

## 2024-12-15 RX ORDER — QUETIAPINE FUMARATE 25 MG/1
12.5 TABLET, FILM COATED ORAL NIGHTLY PRN
Status: DISCONTINUED | OUTPATIENT
Start: 2024-12-15 | End: 2024-12-20

## 2024-12-15 RX ORDER — OXYCODONE HYDROCHLORIDE 5 MG/1
2.5 TABLET ORAL EVERY 8 HOURS PRN
Status: DISCONTINUED | OUTPATIENT
Start: 2024-12-15 | End: 2024-12-15

## 2024-12-15 RX ORDER — POLYETHYLENE GLYCOL 3350 17 G/17G
17 POWDER, FOR SOLUTION ORAL DAILY PRN
Status: DISCONTINUED | OUTPATIENT
Start: 2024-12-15 | End: 2024-12-22

## 2024-12-15 RX ADMIN — Medication 2.5 MG: at 21:07

## 2024-12-15 RX ADMIN — TROLAMINE SALICYLATE 1 APPLICATION: 100 CREAM TOPICAL at 21:08

## 2024-12-15 RX ADMIN — ARFORMOTEROL TARTRATE 15 MCG: 15 SOLUTION RESPIRATORY (INHALATION) at 18:48

## 2024-12-15 RX ADMIN — LEVOTHYROXINE SODIUM 175 MCG: 0.03 TABLET ORAL at 05:31

## 2024-12-15 RX ADMIN — WHITE PETROLATUM 1 APPLICATION: 1.75 OINTMENT TOPICAL at 12:29

## 2024-12-15 RX ADMIN — INSULIN LISPRO 2 UNITS: 100 INJECTION, SOLUTION INTRAVENOUS; SUBCUTANEOUS at 18:29

## 2024-12-15 RX ADMIN — OXYCODONE HYDROCHLORIDE 2.5 MG: 5 TABLET ORAL at 08:58

## 2024-12-15 RX ADMIN — BUDESONIDE 0.5 MG: 0.5 INHALANT ORAL at 08:56

## 2024-12-15 RX ADMIN — ACETAMINOPHEN 650 MG: 325 TABLET ORAL at 16:23

## 2024-12-15 RX ADMIN — Medication 10 ML: at 08:32

## 2024-12-15 RX ADMIN — ACETAMINOPHEN 650 MG: 325 TABLET ORAL at 21:08

## 2024-12-15 RX ADMIN — POLYETHYLENE GLYCOL 3350 17 G: 17 POWDER, FOR SOLUTION ORAL at 09:22

## 2024-12-15 RX ADMIN — OXYCODONE HYDROCHLORIDE 2.5 MG: 5 TABLET ORAL at 22:42

## 2024-12-15 RX ADMIN — WHITE PETROLATUM 57.7 %-MINERAL OIL 31.9 % EYE OINTMENT: at 21:08

## 2024-12-15 RX ADMIN — INSULIN LISPRO 2 UNITS: 100 INJECTION, SOLUTION INTRAVENOUS; SUBCUTANEOUS at 05:43

## 2024-12-15 RX ADMIN — ACETAMINOPHEN 650 MG: 325 TABLET ORAL at 09:22

## 2024-12-15 RX ADMIN — MICAFUNGIN SODIUM 100 MG: 100 INJECTION, POWDER, LYOPHILIZED, FOR SOLUTION INTRAVENOUS at 08:32

## 2024-12-15 RX ADMIN — BUDESONIDE 0.5 MG: 0.5 INHALANT ORAL at 18:48

## 2024-12-15 RX ADMIN — Medication 10 ML: at 22:33

## 2024-12-15 RX ADMIN — ARFORMOTEROL TARTRATE 15 MCG: 15 SOLUTION RESPIRATORY (INHALATION) at 08:56

## 2024-12-15 RX ADMIN — INSULIN LISPRO 2 UNITS: 100 INJECTION, SOLUTION INTRAVENOUS; SUBCUTANEOUS at 00:02

## 2024-12-15 RX ADMIN — QUETIAPINE FUMARATE 12.5 MG: 25 TABLET ORAL at 22:39

## 2024-12-15 RX ADMIN — INSULIN LISPRO 2 UNITS: 100 INJECTION, SOLUTION INTRAVENOUS; SUBCUTANEOUS at 12:28

## 2024-12-15 NOTE — PLAN OF CARE
Goal Outcome Evaluation:  Plan of Care Reviewed With: patient        Progress: no change  Outcome Evaluation: VSS.  ST on telemetry.  Pt remains confused, nonverbal.  Is restless and needs to be repositioned frequently.  family at bedside.  tolerating tube feedings at goal rate of 40cc/hr with coretrac in place.  O2 sats 95 or above on 2liters oxygen.

## 2024-12-15 NOTE — PROGRESS NOTES
[facility]     Progress Note    Patient Name: Nelson Wang  : 1945  MRN: [id number]  Primary Care Physician:  Domingo Bravo MD  Date of admission: 2024    Subjective   Subjective     Chief Complaint:   Follow-up for post cardiac arrest with hemoptysis, acute hypoxic respiratory failure, acute on chronic congestive diastolic heart failure, acute cardiogenic pulmonary edema, loculated right-sided pleural effusion, ESRD on hemodialysis, GASTON with home CPAP.    Over past 24 hours, transferred out of ICU.    This morning,  Currently on room air  Family at bedside  Mentation slowly improving  More responsive but remains nonverbal  Tolerating tube feeds  BP stable  Remains on micafungin  Completed vancomycin  Repeat blood cultures negative  No fevers      Objective   Objective     Vitals:   Temp:  [97.9 °F (36.6 °C)-98.6 °F (37 °C)] 98.6 °F (37 °C)  Heart Rate:  [] 104  Resp:  [16-31] 20  BP: (124-148)/(44-63) 144/63  Flow (L/min) (Oxygen Therapy):  [2] 2    Physical Exam   Vital Signs Reviewed  General: Chronically ill-appearing elderly male, more awake but still confused, NAD, lying in bed   Chest:  poor aeration, diminished breath sounds, no increased work of breathing noted, decreasing subcutaneous emphysema right chest  CV: RRR, no MGR, pulses 2+, equal  Abd:  Soft, NT, ND, + BS, no HSM  EXT:  no clubbing, no cyanosis, No BLE edema, left arm AV fistula in place  Neuro: Grimaces to pain moves all extremities, opens eyes to name, trying to regard, not answering questions, cranial nerves intact, does open eyes to name  Skin: No rashes or lesions noted      Result Review    Result Review:  I have personally reviewed the results from the time of this admission to 12/15/2024 07:38 EST and agree with these findings:  [x]  Laboratory  [x]  Microbiology  [x]  Radiology  [x]  EKG/Telemetry   [x]  Cardiology/Vascular   []  Pathology  []  Old records  []  Other:  Most notable findings include:        Lab 12/15/24  0445 12/14/24  0428 12/13/24  1416 12/13/24  0319 12/12/24  0417 12/11/24  0611 12/11/24  0431 12/11/24  0008 12/10/24  1814 12/10/24  1221 12/10/24  0620 12/09/24  1220 12/09/24  0622   WBC 8.31 9.35  --  8.98 7.97 9.57  --   --   --   --  5.77  --  5.23   HEMOGLOBIN 10.7* 10.6*  --  11.5* 10.6* 11.4*  --   --   --   --  9.1*  --  8.8*   HEMATOCRIT 36.6* 34.8*  --  39.2 35.8* 38.1  --   --   --   --  30.5*  --  29.5*   PLATELETS 153 165  --  182 146 148  --   --   --   --  124*  --  115*   SODIUM 134* 136  --  140 139 135*  135*  --  137 138   < > 136   < > 136   POTASSIUM 4.2 3.7  --  4.1 4.1 3.9  3.9  --  3.8 3.9   < > 4.1   < > 4.4   CHLORIDE 94* 98  --  102 104 102  102  --  105 105   < > 103   < > 102   CO2 21.0* 25.1  --  24.9 20.8* 19.3*  19.3*  --  21.5* 22.7   < > 20.3*   < > 20.9*   BUN 65* 43*  --  66* 38* 18  18  --  17 16   < > 17   < > 20   CREATININE 4.90* 3.00*  --  5.19* 3.32* 1.55*  1.55* 1.49* 1.61* 1.73*   < > 1.75*   < > 2.16*   GLUCOSE 178* 242*  --  266* 172* 236*  236*  --  220* 181*   < > 188*   < > 136*   CALCIUM 9.7 9.4  --  10.4 10.2 9.7  9.7  --  9.2 8.9   < > 8.6   < > 9.1   PHOSPHORUS 4.8* 4.3  4.3 1.6* 2.2* 2.4* 2.8  --  2.4* 2.6   < > 2.7   < > 4.0   TOTAL PROTEIN 7.3  --   --  8.0 7.9 9.0*  --   --   --   --   --   --   --    ALBUMIN 3.2* 3.4*  --  3.9 3.9 3.9  3.9  --  3.5 3.3*   < > 3.1*   < > 3.2*   GLOBULIN 4.1  --   --  4.1 4.0 5.1  --   --   --   --   --   --   --     < > = values in this interval not displayed.     XR Chest 1 View    Result Date: 12/13/2024  XR CHEST 1 VW Date of Exam: 12/13/2024 7:47 AM EST Indication: Coretrak placement Comparison: 12/11/2024 chest radiograph Findings: Feeding tube removed. Lung volumes are low. Cardiomediastinal silhouette stable. Aortic atherosclerotic disease. Blunted costophrenic angles suspicious for small effusions. Nonspecific bibasilar opacities which could reflect atelectasis or airspace disease similar  to previous comparison. There is right greater than left chest wall subcutaneous emphysema similar to prior. No visualized pneumothorax. Degenerative related osseous change.     Impression: Impression: Removed feeding tube, otherwise no significant radiographic change since 12/11/2024. Electronically Signed: Lorne Bustamante MD  12/13/2024 8:06 AM EST  Workstation ID: [id number]     Results for orders placed during the hospital encounter of 12/05/24    Adult Transthoracic Echo Complete W/ Cont if Necessary Per Protocol    Interpretation Summary    The quality of the study is limited due to limited views obtained    Left ventricular systolic function is normal. Calculated left ventricular EF = 57.8%    The right ventricular cavity is mildly dilated.    The left atrial cavity is mildly dilated.    There is mild calcification of the aortic valve no evidence of vegetation seen    Blood cultures with Candida and Staph epidermidis  Repeat blood cultures negative    EEG with no seizures.  Did have some slowing consistent with encephalopathy      Assessment & Plan   Assessment / Plan       Active Hospital Problems:  Active Hospital Problems    Diagnosis     **Hypoxia    Status post cardiac arrest secondary to hypoxia  Acute hypoxemic and hypercapnic respiratory failure require mechanical ventilation  Hemoptysis  Concern for intraparenchymal bleed   Status post thoracentesis  Candidemia  Acute on chronic hypoxic respiratory failure  Acute cardiogenic pulmonary edema  End-stage renal disease on dialysis leading to heart failure  Acute diastolic heart failure with EF of 56-60  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  Wean O2 to keep SPO2 greater than 90%  -Continue frequent suctioning for airway clearance  -Continue  midodrine 10 mg 3 times daily.   -Appreciate nephrology input.  HD timing per nephrology.  -Mental status slowly improving secondary to improvement in encephalopathy.    EEG was consistent with encephalopathy/generalized slowing.  Keppra discontinued.  -Head CT did not show any acute or obvious pathology.  If stagnates or no improvement in the over the next couple days, consider brain MRI  -On day 9 of micafungin for Candida fungemia.  Repeat cultures negative  -Completed vancomycin for Staph epidermidis bacteremia x  6 weeks of antibiotics for Staph epidermidis discitis.    -Trend H&H. Transfuse for hemoglobin less than 7  -Continue Brovana, Pulmicort and add DuoNeb.  -Bronchoscopy cultures negative so far.  -Tube feeds at goal per dietitian recommendations  -Continue aspiration precautions.  Elevate head of bed.  -PT/OT on board.  Appreciate assistance.       VTE Prophylaxis:  Pharmacologic & mechanical VTE prophylaxis orders are present.    CODE STATUS:   Level Of Support Discussed With: Next of Kin (If No Surrogate)  Code Status (Patient has no pulse and is not breathing): No CPR (Do Not Attempt to Resuscitate)  Medical Interventions (Patient has pulse or is breathing): Full Support      Labs, imaging, microbiology, notes and medications personally reviewed  Discussed with primary    I, Dr. Scottie Valentin, have spent more than 50% of the total time managing the patient in this encounter today.  This included personally reviewing all pertinent labs, imaging, microbiology and documentation. Also discussing the case with the patient and any available family, the admitting physician and any available ancillary staff.    Electronically signed by Scottie Valentin MD, 12/15/2024, 07:38 EST.  Electronically signed by Scottie Valentin MD, 12/15/24, 11:38 AM EST.

## 2024-12-15 NOTE — PROGRESS NOTES
[facility]   Hospitalist Progress Note  Date: 12/15/2024  Patient Name: Nelson Wang  : 1945  MRN: [id number]  Date of admission: 2024  Room/Bed: [facility]      Subjective   Subjective     Chief Complaint: Shortness of breath    Summary:    Nelson Wang is a 79 y.o. 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  Patient was started on supplemental oxygen to maintain his oxygen saturations.  He was given a dose of Bumex overnight.  His chest x-ray was consistent with bilateral pleural effusions.  Pulmonology was consulted for the bilateral pleural effusions and and nephrology consulted for possible volume overload.  Pulmonology was performing a thoracentesis this morning.  Thoracentesis was showing bloody fluid.  Patient suffered CODE BLUE. Likely bleeding into his airways causing hypoxic arrest.  He received CPR for 6 minutes and achieved ROSC.  He was transferred to the ICU, intubated and on blood pressure support.  Bronchoscopy was showing blood clots in his airways.  Patient was started on CRRT.  Patient's medical status is tenuous.  He is currently on 1 pressor. CRRT is removing fluid at a slow rate.  Patient started on micafungin on 2024.  Patient extubated on 2024.  Patient on high flow nasal cannula 30 L 25%.  Slowly weaning down on supplemental oxygen, breathing more comfortably.  Poor mental status still waxing and waning.  Patient has been transitioned over to hemodialysis, blood pressure tolerating.    Interval Followup:   Patient's mental status continues to slowly improve.  Family stating he is more interactive with them.  He is having more pain, scheduling Tylenol.  Family asking for Aspercreme.  Starting bowel regiment for patient today.  Will try and have Seroquel available at nights to help reinforce appropriate  sleep-wake cycles.    Objective   Objective     Vitals:   Temp:  [91.5 °F (33.1 °C)-98.7 °F (37.1 °C)] 98.7 °F (37.1 °C)  Heart Rate:  [] (P) 88  Resp:  [16-31] (P) 18  BP: (113-144)/(44-63) (P) 119/40  Flow (L/min) (Oxygen Therapy):  [2] 2    Physical Exam   General: More awake and interactive on rounds.  Cardiovascular: RRR, no murmurs   Pulmonary: nasal cannula in place, coarse breath sounds  Gastrointestinal: Abdomen is soft, nondistended  Musculoskeletal: No lower extremity edema  Neuro: Unable to assess mental status, spontaneously moving all 4 extremities    Result Review    Result Review:  I have personally reviewed these results:  [x]  Laboratory      Lab 12/15/24  0445 12/14/24  0428 12/13/24  0319 12/11/24  0611 12/11/24  0431 12/10/24  0620 12/09/24  0622   WBC 8.31 9.35 8.98   < >  --  5.77 5.23   HEMOGLOBIN 10.7* 10.6* 11.5*   < >  --  9.1* 8.8*   HEMATOCRIT 36.6* 34.8* 39.2   < >  --  30.5* 29.5*   PLATELETS 153 165 182   < >  --  124* 115*   NEUTROS ABS 4.46 5.51 5.25   < >  --  3.57 3.82   IMMATURE GRANS (ABS) 0.08* 0.14* 0.15*   < >  --  0.05 0.03   LYMPHS ABS 1.95 1.90 1.95   < >  --  1.14 0.76   MONOS ABS 1.34* 1.30* 1.24*   < >  --  0.71 0.55   EOS ABS 0.43* 0.43* 0.36   < >  --  0.24 0.03   MCV 94.1 88.8 92.7   < >  --  91.3 91.6   LACTATE  --   --   --   --  2.2* 1.3 1.1    < > = values in this interval not displayed.         Lab 12/15/24  0445 12/14/24  0428 12/13/24  1416 12/13/24 0319   SODIUM 134* 136  --  140   POTASSIUM 4.2 3.7  --  4.1   CHLORIDE 94* 98  --  102   CO2 21.0* 25.1  --  24.9   ANION GAP 19.0* 12.9  --  13.1   BUN 65* 43*  --  66*   CREATININE 4.90* 3.00*  --  5.19*   EGFR 11.4* 20.6*  --  10.7*   GLUCOSE 178* 242*  --  266*   CALCIUM 9.7 9.4  --  10.4   MAGNESIUM 2.2 2.0  --  3.1*   PHOSPHORUS 4.8* 4.3  4.3 1.6* 2.2*         Lab 12/15/24  0445 12/14/24  0428 12/13/24 0319 12/12/24 0417   TOTAL PROTEIN 7.3  --  8.0 7.9   ALBUMIN 3.2* 3.4* 3.9 3.9   GLOBULIN 4.1   --  4.1 4.0   ALT (SGPT) 17  --  20 23   AST (SGOT) 31  --  36 39   BILIRUBIN 0.9  --  1.0 1.4*   ALK PHOS 84  --  93 76                         Lab 12/11/24  0431   PH, ARTERIAL 7.302*   PCO2, ARTERIAL 45.6*   PO2 ART 91.3   O2 SATURATION ART 96.1   FIO2 32   HCO3 ART 22.5   BASE EXCESS ART -4.0*     Brief Urine Lab Results  (Last result in the past 365 days)        Color   Clarity   Blood   Leuk Est   Nitrite   Protein   CREAT   Urine HCG        10/27/24 1417 Yellow   Cloudy   Negative   Trace   Negative   >=300 mg/dL (3+)                 [x]  Microbiology   Microbiology Results (last 10 days)       Procedure Component Value - Date/Time    Blood Culture - Blood, Blood, Central Line [[id number]]  (Normal) Collected: 12/07/24 1023    Lab Status: Final result Specimen: Blood, Central Line Updated: 12/12/24 1030     Blood Culture No growth at 5 days    Blood Culture - Blood, Blood, Arterial Line [[id number]]  (Normal) Collected: 12/07/24 1023    Lab Status: Final result Specimen: Blood, Arterial Line Updated: 12/12/24 1046     Blood Culture No growth at 5 days    Pneumonia Panel - Lavage, Lung, Right Lower Lobe [[id number]]  (Normal) Collected: 12/06/24 1719    Lab Status: Final result Specimen: Lavage from Lung, Right Lower Lobe Updated: 12/06/24 1859     Escherichia coli PCR Not Detected     Acinetobacter calcoaceticus-baumannii complex PCR Not Detected     Enterobacter cloacae PCR Not Detected     Klebsiella oxytoca PCR Not Detected     Klebsiella pneumoniae group PCR Not Detected     Klebsiella aerogenes PCR Not Detected     Moraxella catarrhalis PCR Not Detected     Proteus species PCR Not Detected     Pseudomonas aeroginosa PCR Not Detected     Serratia marcescens PCR Not Detected     Staphylococcus aureus PCR Not Detected     Streptococcus pyogenes PCR Not Detected     Haemophilus influenzae PCR Not Detected     Streptococcus agalactiae PCR Not Detected     Streptococcus pneumoniae PCR Not Detected      Chlamydophila pneumoniae PCR Not Detected     Legionella pneumophilia PCR Not Detected     Mycoplasma pneumo by PCR Not Detected     ADENOVIRUS, PCR Not Detected     CTX-M Gene N/A     IMP Gene N/A     KPC Gene N/A     mecA/C and MREJ Gene N/A     NDM Gene N/A     OXA-48-like Gene N/A     VIM Gene N/A     Coronavirus Not Detected     Human Metapneumovirus Not Detected     Human Rhinovirus/Enterovirus Not Detected     Influenza A PCR Not Detected     Influenza B PCR Not Detected     RSV, PCR Not Detected     Parainfluenza virus PCR Not Detected    BAL Culture, Quantitative - Lavage, Bronchus [[id number]] Collected: 12/06/24 1719    Lab Status: Final result Specimen: Lavage from Bronchus Updated: 12/08/24 1013     BAL Culture >100,000 CFU/mL Normal respiratory lashae. No S. aureus or Pseudomonas aeruginosa detected. Final report.     Gram Stain No organisms seen    Anaerobic Culture - Pleural Fluid, Pleural Cavity [[id number]]  (Normal) Collected: 12/06/24 1235    Lab Status: Final result Specimen: Pleural Fluid from Pleural Cavity Updated: 12/11/24 0657     Anaerobic Culture No anaerobes isolated at 5 days    AFB Culture - Body Fluid, Pleural Cavity [[id number]] Collected: 12/06/24 1233    Lab Status: Preliminary result Specimen: Body Fluid from Pleural Cavity Updated: 12/13/24 1245     AFB Culture No AFB isolated at 1 week     AFB Stain Few (2+) WBCs seen      No organisms seen      No acid fast bacilli seen on direct smear    Body Fluid Culture - Body Fluid, Pleural Cavity [[id number]] Collected: 12/06/24 1233    Lab Status: Final result Specimen: Body Fluid from Pleural Cavity Updated: 12/09/24 0952     Body Fluid Culture No growth at 3 days     Gram Stain Few (2+) WBCs seen      No organisms seen    Fungus Culture - Body Fluid, Pleural Cavity [[id number]] Collected: 12/06/24 1233    Lab Status: Preliminary result Specimen: Body Fluid from Pleural Cavity Updated: 12/13/24 1245     Fungus Culture No fungus isolated at 1  week    Blood Culture - Blood, Arm, Left [[id number]]  (Abnormal)  (Susceptibility) Collected: 12/05/24 1755    Lab Status: Final result Specimen: Blood from Arm, Left Updated: 12/11/24 0729     Blood Culture Staphylococcus epidermidis     Isolated from Aerobic Bottle     Blood Culture Streptococcus mutans     Isolated from Anaerobic Bottle     Gram Stain Aerobic Bottle Gram positive cocci in clusters      Anaerobic Bottle Gram positive cocci in chains    Narrative:            Susceptibility        Staphylococcus epidermidis      RITU      Oxacillin Resistant      Vancomycin Susceptible                       Susceptibility        Streptococcus mutans      RITU      Ceftriaxone Susceptible      Penicillin G Susceptible      Vancomycin Susceptible                           Blood Culture - Blood, Arm, Left [[id number]]  (Abnormal) Collected: 12/05/24 1755    Lab Status: Final result Specimen: Blood from Arm, Left Updated: 12/11/24 0729     Blood Culture Staphylococcus epidermidis     Isolated from Aerobic and Anaerobic Bottles     Blood Culture Streptococcus mutans     Isolated from Anaerobic Bottle     Blood Culture Corynebacterium species     Isolated from Anaerobic Bottle     Gram Stain Aerobic Bottle Gram positive cocci in clusters      Anaerobic Bottle Gram positive cocci in chains and clusters    Narrative:      Refer to previous blood culture collected on 12/05/2024 1755 for MICs  Corynebacterium is probable contaminant requires clinical correlation, susceptibility not performed unless requested by physician.       Blood Culture ID, PCR - Blood, Arm, Left [[id number]]  (Abnormal) Collected: 12/05/24 1755    Lab Status: Final result Specimen: Blood from Arm, Left Updated: 12/06/24 1617     BCID, PCR Staph spp, not aureus or lugdunensis. Identification by BCID2 PCR.     BCID, PCR 2 Streptococcus spp, not A, B, or pneumoniae. Identification by BCID2 PCR.     BOTTLE TYPE Aerobic Bottle    Narrative:      Staph Sp mecA/C     Blood Culture ID, PCR - Blood, Arm, Left [[id number]]  (Abnormal) Collected: 12/05/24 1755    Lab Status: Final result Specimen: Blood from Arm, Left Updated: 12/07/24 0943     BCID, PCR Staph spp, not aureus or lugdunensis. Identification by BCID2 PCR.     BCID, PCR 2 Streptococcus spp, not A, B, or pneumoniae. Identification by BCID2 PCR.     BOTTLE TYPE Anaerobic Bottle    Narrative:      S.epi with mecA/C    Blood Culture ID, PCR - Blood, Arm, Left [[id number]]  (Abnormal) Collected: 12/05/24 1755    Lab Status: Final result Specimen: Blood from Arm, Left Updated: 12/07/24 0913     BCID, PCR Streptococcus spp, not A, B, or pneumoniae. Identification by BCID2 PCR.     BCID, PCR 2 Candida glabrata. Identification by BCID2 PCR.     BOTTLE TYPE Anaerobic Bottle    Narrative:      Infectious disease consultation is highly recommended to rule out distant foci of infection.          [x]  Radiology  CT Head Without Contrast    Result Date: 12/13/2024  Impression: No acute intracranial process. Improving right posterior scalp hematoma. Electronically Signed: Margaret Hammond MD  12/13/2024 9:05 AM EST  Workstation ID: [id number]    XR Chest 1 View    Result Date: 12/13/2024  Impression: Removed feeding tube, otherwise no significant radiographic change since 12/11/2024. Electronically Signed: Lorne Bustamante MD  12/13/2024 8:06 AM EST  Workstation ID: [id number]    XR Abdomen KUB    Result Date: 12/11/2024  Impression: 1.Lines and tubes as above. 2.Indeterminate bowel gas pattern. 3.Subcutaneous emphysema overlies the right lower chest. Bilateral pleural effusions. Electronically Signed: Carlos Joiner MD  12/11/2024 7:27 AM EST  Workstation ID: [id number]    XR Chest 1 View    Result Date: 12/11/2024  1.Interval removal of right chest tube without evidence of pneumothorax. 2.Persistent pleural effusions with bibasilar consolidations, right worse than left. 3.Subcutaneous emphysema. Electronically Signed: Cristian Tiwari MD   12/11/2024 5:15 AM EST  Workstation ID: [id number]    XR Chest 1 View    Result Date: 12/10/2024  1.No evidence of pneumothorax. 2.Improved aeration at the left lung base. 3.Persistent infiltrate at the right lung base. 4.Subcutaneous emphysema. Electronically Signed: Cristian Tiwari MD  12/10/2024 2:10 AM EST  Workstation ID: [id number]    XR Chest 1 View    Result Date: 12/9/2024  Impression: 1. No change in subcutaneous emphysema. 2. Slight improvement in patchy bilateral airspace disease. 3. No change in right thoracostomy tube. No definite pneumothorax identified. Electronically Signed: Stephan Dey MD  12/9/2024 5:18 PM EST  Workstation ID: [id number]    XR Chest 1 View    Result Date: 12/9/2024  Impression: 1.Interval extubation. 2.Right-sided chest tube in unchanged position. No definite pneumothorax. 3.Patchy right greater than left airspace disease, similar to the prior examination. 4.Subcutaneous emphysema throughout the chest, right greater than left. This has mildly improved from the prior exam. Electronically Signed: Reynaldo Rangel  12/9/2024 9:35 AM EST  Workstation ID: [id number]    XR Chest 1 View    Result Date: 12/8/2024  1.No significant interval change. 2.Right-sided chest tube without evidence of pneumothorax. 3.Bilateral infiltrates, right worse than left. Electronically Signed: Cristian Tiwari MD  12/8/2024 6:11 AM EST  Workstation ID: [id number]    XR Chest 1 View    Result Date: 12/7/2024  1.Right chest tube has been pulled back slightly, but the side ports appear to be within the pleural space. No definite pneumothorax. 2.Extensive subcutaneous gas in the chest wall. 3.Stable bilateral infiltrates. Electronically Signed: Cristian Tiwari MD  12/7/2024 8:31 PM EST  Workstation ID: [id number]   []  EKG/Telemetry   []  Cardiology/Vascular   []  Pathology  []  Old records  []  Other:    Assessment & Plan   Assessment / Plan     Status postcardiac arrest, secondary to hypoxia  Acute hypoxic/hypercapnic  respiratory failure requiring mechanical ventilation  Hemoptysis  Altered mental status, concern for seizures  End-stage renal disease on dialysis  Acute on chronic diastolic heart failure  Staph epidermidis cystitis on vancomycin  Type 2 diabetes  Atrial fibrillation on Eliquis  Candidemia    Plan:  Patient remains in the hospital for further care and management, remained stable on telemetry bed  Pulmonary critical care, nephrology consulted, appreciate assistance  For concerns of seizure-like activity, started empirically on Keppra.  EEG returned with no seizure-like activity.  Will hold further doses of Keppra at this time  Try and hold any sedating medications as able, patient with several risk factors for encephalopathy, delirium  Starting scheduled melatonin this evening.  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    Patient's blood cultures from 12/7/2024 remain no growth to date  Continue core track and tube feeds.  Will continue ongoing discussions with family about PEG tube.  Discussions thus far with family indicate as long as patient's mentation is improving they could be agreeable to PEG tube in the near future.    Discussed with RN.  Discussed with intensivist, discussed with family at bedside    VTE Prophylaxis:  Pharmacologic & mechanical VTE prophylaxis orders are present.        CODE STATUS:   Level Of Support Discussed With: Next of Kin (If No Surrogate)  Code Status (Patient has no pulse and is not breathing): No CPR (Do Not Attempt to Resuscitate)  Medical Interventions (Patient has pulse or is breathing): Full Support

## 2024-12-15 NOTE — PROGRESS NOTES
"Nephrology progress note    Galileo Cook MD     Current history: Patient arousable but still confused his daughter is at bedside who is a physician here states he seems to be a little better today than yesterday.  Patient is currently comfortable    Current medication list:  acetaminophen, 650 mg, Nasogastric, Q6H  arformoterol, 15 mcg, Nebulization, BID - RT  budesonide, 0.5 mg, Nebulization, BID - RT  insulin lispro, 2-7 Units, Subcutaneous, Q6H  levothyroxine, 175 mcg, Nasogastric, Q AM  melatonin, 2.5 mg, Nasogastric, Nightly  micafungin (MYCAMINE) IV, 100 mg, Intravenous, Q24H  midodrine, 10 mg, Nasogastric, Once per day on Monday Wednesday Friday  mineral oil-hydrophilic petrolatum, 1 Application, Topical, Daily  sodium chloride, 10 mL, Intravenous, Q12H         artificial tears    senna-docusate sodium **AND** polyethylene glycol **AND** [DISCONTINUED] bisacodyl **AND** bisacodyl    dextrose    dextrose    glucagon (human recombinant)    heparin (porcine)    heparin (porcine)    ipratropium-albuterol    Polyvinyl Alcohol-Povidone PF    QUEtiapine    sodium chloride    sodium chloride    sodium chloride    trolamine salicylate     Physical Exam:  BP (P) 119/40 (BP Location: Right arm, Patient Position: Lying)   Pulse (P) 88   Temp 98.7 °F (37.1 °C) (Rectal)   Resp (P) 18   Ht 170.2 cm (67\")   Wt 62.5 kg (137 lb 12.6 oz)   SpO2 (P) 90%   BMI 21.58 kg/m²     Intake/Output Summary (Last 24 hours) at 12/15/2024 1256  Last data filed at 12/15/2024 0700  Gross per 24 hour   Intake 1757 ml   Output --   Net 1757 ml      General: Patient arousable, comfortable but still confused   cardiac: Tachycardic without a rub  Lungs: Few bilateral rhonchi  Abdomen: Bowel sounds positive nontender soft   Extremities: Shiley catheter out of the groin, no significant lower extremity swelling   skin: No acute rashes noted  : Deferred  Neuro: Arousable but still confused    Results from last 7 days   Lab Units " 12/15/24  0445 12/14/24  0428 12/13/24  0319 12/12/24  0417   SODIUM mmol/L 134* 136 140 139   POTASSIUM mmol/L 4.2 3.7 4.1 4.1   CHLORIDE mmol/L 94* 98 102 104   CO2 mmol/L 21.0* 25.1 24.9 20.8*   BUN mg/dL 65* 43* 66* 38*   CREATININE mg/dL 4.90* 3.00* 5.19* 3.32*   CALCIUM mg/dL 9.7 9.4 10.4 10.2   BILIRUBIN mg/dL 0.9  --  1.0 1.4*   ALK PHOS U/L 84  --  93 76   ALT (SGPT) U/L 17  --  20 23   AST (SGOT) U/L 31  --  36 39   GLUCOSE mg/dL 178* 242* 266* 172*       Estimated Creatinine Clearance: 11 mL/min (A) (by C-G formula based on SCr of 4.9 mg/dL (H)).    Results from last 7 days   Lab Units 12/15/24  0445 12/14/24  0428 12/13/24  1416 12/13/24  0319   MAGNESIUM mg/dL 2.2 2.0  --  3.1*   PHOSPHORUS mg/dL 4.8* 4.3  4.3 1.6* 2.2*             Results from last 7 days   Lab Units 12/15/24  0445 12/14/24  0428 12/13/24  0319 12/12/24  0417 12/11/24  0611   WBC 10*3/mm3 8.31 9.35 8.98 7.97 9.57   HEMOGLOBIN g/dL 10.7* 10.6* 11.5* 10.6* 11.4*   PLATELETS 10*3/mm3 153 165 182 146 148             Imaging Results (Last 24 Hours)       ** No results found for the last 24 hours. **             Assessment/plan:      - ESRD, was on home dialysis. Off CRRT.  Off pressors, hemodynamically stable, on Friday.  Will plan on dialyzing again tomorrow using fistula.  Electrolytes are stable    - Volume overload, much better now.         - Aspiration pneumonia, and possibly recurrent aspiration, per pulmonary.      - Type 2 diabetes with complications.     - Hypotension blood pressure is acceptable now, on ProAmatine and blood pressure stable.     - Anemia of chronic kidney disease, hemoglobin 10.7 with goal greater than 10, on RONALD as outpatient.  Getting Epogen while here.     - Altered mentation.  Multifactorial.    Thank you very much, please contact me at [phone]    Galileo Cook MD

## 2024-12-16 LAB
ANION GAP SERPL CALCULATED.3IONS-SCNC: 18.3 MMOL/L (ref 5–15)
ANISOCYTOSIS BLD QL: NORMAL
BASOPHILS # BLD AUTO: 0.04 10*3/MM3 (ref 0–0.2)
BASOPHILS NFR BLD AUTO: 0.6 % (ref 0–1.5)
BUN SERPL-MCNC: 88 MG/DL (ref 8–23)
BUN/CREAT SERPL: 14.6 (ref 7–25)
CALCIUM SPEC-SCNC: 9.5 MG/DL (ref 8.6–10.5)
CHLORIDE SERPL-SCNC: 92 MMOL/L (ref 98–107)
CO2 SERPL-SCNC: 18.7 MMOL/L (ref 22–29)
CREAT SERPL-MCNC: 6.03 MG/DL (ref 0.76–1.27)
DEPRECATED RDW RBC AUTO: 65.5 FL (ref 37–54)
EGFRCR SERPLBLD CKD-EPI 2021: 8.9 ML/MIN/1.73
EOSINOPHIL # BLD AUTO: 0.44 10*3/MM3 (ref 0–0.4)
EOSINOPHIL NFR BLD AUTO: 6.8 % (ref 0.3–6.2)
ERYTHROCYTE [DISTWIDTH] IN BLOOD BY AUTOMATED COUNT: 21.8 % (ref 12.3–15.4)
GLUCOSE BLDC GLUCOMTR-MCNC: 129 MG/DL (ref 70–99)
GLUCOSE BLDC GLUCOMTR-MCNC: 130 MG/DL (ref 70–99)
GLUCOSE BLDC GLUCOMTR-MCNC: 204 MG/DL (ref 70–99)
GLUCOSE SERPL-MCNC: 216 MG/DL (ref 65–99)
HCT VFR BLD AUTO: 35.1 % (ref 37.5–51)
HGB BLD-MCNC: 10.4 G/DL (ref 13–17.7)
HOLD SPECIMEN: NORMAL
HOLD SPECIMEN: NORMAL
HYPOCHROMIA BLD QL: NORMAL
IMM GRANULOCYTES # BLD AUTO: 0.05 10*3/MM3 (ref 0–0.05)
IMM GRANULOCYTES NFR BLD AUTO: 0.8 % (ref 0–0.5)
LYMPHOCYTES # BLD AUTO: 1.7 10*3/MM3 (ref 0.7–3.1)
LYMPHOCYTES NFR BLD AUTO: 26.1 % (ref 19.6–45.3)
MAGNESIUM SERPL-MCNC: 2.3 MG/DL (ref 1.6–2.4)
MCH RBC QN AUTO: 27.4 PG (ref 26.6–33)
MCHC RBC AUTO-ENTMCNC: 29.6 G/DL (ref 31.5–35.7)
MCV RBC AUTO: 92.4 FL (ref 79–97)
MONOCYTES # BLD AUTO: 1.04 10*3/MM3 (ref 0.1–0.9)
MONOCYTES NFR BLD AUTO: 16 % (ref 5–12)
NEUTROPHILS NFR BLD AUTO: 3.24 10*3/MM3 (ref 1.7–7)
NEUTROPHILS NFR BLD AUTO: 49.7 % (ref 42.7–76)
NRBC BLD AUTO-RTO: 0 /100 WBC (ref 0–0.2)
PHOSPHATE SERPL-MCNC: 5.8 MG/DL (ref 2.5–4.5)
PLATELET # BLD AUTO: 141 10*3/MM3 (ref 140–450)
PMV BLD AUTO: 10.4 FL (ref 6–12)
POLYCHROMASIA BLD QL SMEAR: NORMAL
POTASSIUM SERPL-SCNC: 4.3 MMOL/L (ref 3.5–5.2)
RBC # BLD AUTO: 3.8 10*6/MM3 (ref 4.14–5.8)
SMALL PLATELETS BLD QL SMEAR: NORMAL
SMUDGE CELLS BLD QL SMEAR: NORMAL
SODIUM SERPL-SCNC: 129 MMOL/L (ref 136–145)
TROPONIN T SERPL HS-MCNC: 130 NG/L
WBC NRBC COR # BLD AUTO: 6.51 10*3/MM3 (ref 3.4–10.8)
WHOLE BLOOD HOLD COAG: NORMAL
WHOLE BLOOD HOLD SPECIMEN: NORMAL

## 2024-12-16 PROCEDURE — 25010000002 MICAFUNGIN SODIUM 100 MG RECONSTITUTED SOLUTION 1 EACH VIAL: Performed by: INTERNAL MEDICINE

## 2024-12-16 PROCEDURE — 82948 REAGENT STRIP/BLOOD GLUCOSE: CPT | Performed by: NURSE PRACTITIONER

## 2024-12-16 PROCEDURE — 94799 UNLISTED PULMONARY SVC/PX: CPT

## 2024-12-16 PROCEDURE — 84484 ASSAY OF TROPONIN QUANT: CPT | Performed by: INTERNAL MEDICINE

## 2024-12-16 PROCEDURE — 63710000001 INSULIN LISPRO (HUMAN) PER 5 UNITS: Performed by: NURSE PRACTITIONER

## 2024-12-16 PROCEDURE — 85007 BL SMEAR W/DIFF WBC COUNT: CPT | Performed by: STUDENT IN AN ORGANIZED HEALTH CARE EDUCATION/TRAINING PROGRAM

## 2024-12-16 PROCEDURE — 93010 ELECTROCARDIOGRAM REPORT: CPT | Performed by: STUDENT IN AN ORGANIZED HEALTH CARE EDUCATION/TRAINING PROGRAM

## 2024-12-16 PROCEDURE — 99232 SBSQ HOSP IP/OBS MODERATE 35: CPT | Performed by: INTERNAL MEDICINE

## 2024-12-16 PROCEDURE — 85025 COMPLETE CBC W/AUTO DIFF WBC: CPT | Performed by: STUDENT IN AN ORGANIZED HEALTH CARE EDUCATION/TRAINING PROGRAM

## 2024-12-16 PROCEDURE — 84100 ASSAY OF PHOSPHORUS: CPT | Performed by: INTERNAL MEDICINE

## 2024-12-16 PROCEDURE — 93005 ELECTROCARDIOGRAM TRACING: CPT | Performed by: INTERNAL MEDICINE

## 2024-12-16 PROCEDURE — 82948 REAGENT STRIP/BLOOD GLUCOSE: CPT

## 2024-12-16 PROCEDURE — 83735 ASSAY OF MAGNESIUM: CPT | Performed by: INTERNAL MEDICINE

## 2024-12-16 PROCEDURE — 80048 BASIC METABOLIC PNL TOTAL CA: CPT | Performed by: INTERNAL MEDICINE

## 2024-12-16 RX ORDER — OXYCODONE HYDROCHLORIDE 5 MG/1
2.5 TABLET ORAL ONCE AS NEEDED
Status: COMPLETED | OUTPATIENT
Start: 2024-12-16 | End: 2024-12-16

## 2024-12-16 RX ORDER — QUETIAPINE FUMARATE 25 MG/1
12.5 TABLET, FILM COATED ORAL ONCE
Status: COMPLETED | OUTPATIENT
Start: 2024-12-16 | End: 2024-12-16

## 2024-12-16 RX ADMIN — LEVOTHYROXINE SODIUM 175 MCG: 0.03 TABLET ORAL at 05:51

## 2024-12-16 RX ADMIN — Medication 10 ML: at 20:34

## 2024-12-16 RX ADMIN — ACETAMINOPHEN 650 MG: 325 TABLET ORAL at 16:52

## 2024-12-16 RX ADMIN — ACETAMINOPHEN 650 MG: 325 TABLET ORAL at 21:32

## 2024-12-16 RX ADMIN — BUDESONIDE 0.5 MG: 0.5 INHALANT ORAL at 19:32

## 2024-12-16 RX ADMIN — INSULIN LISPRO 3 UNITS: 100 INJECTION, SOLUTION INTRAVENOUS; SUBCUTANEOUS at 06:16

## 2024-12-16 RX ADMIN — MIDODRINE HYDROCHLORIDE 10 MG: 10 TABLET ORAL at 13:39

## 2024-12-16 RX ADMIN — ACETAMINOPHEN 650 MG: 325 TABLET ORAL at 13:39

## 2024-12-16 RX ADMIN — ACETAMINOPHEN 650 MG: 325 TABLET ORAL at 05:50

## 2024-12-16 RX ADMIN — QUETIAPINE FUMARATE 12.5 MG: 25 TABLET ORAL at 11:55

## 2024-12-16 RX ADMIN — MICAFUNGIN SODIUM 100 MG: 100 INJECTION, POWDER, LYOPHILIZED, FOR SOLUTION INTRAVENOUS at 13:38

## 2024-12-16 RX ADMIN — QUETIAPINE FUMARATE 12.5 MG: 25 TABLET ORAL at 21:52

## 2024-12-16 RX ADMIN — ARFORMOTEROL TARTRATE 15 MCG: 15 SOLUTION RESPIRATORY (INHALATION) at 19:32

## 2024-12-16 RX ADMIN — Medication 2.5 MG: at 20:33

## 2024-12-16 RX ADMIN — OXYCODONE HYDROCHLORIDE 2.5 MG: 5 TABLET ORAL at 20:33

## 2024-12-16 NOTE — SIGNIFICANT NOTE
12/16/24 1146   OTHER   Discipline occupational therapist   Rehab Time/Intention   Session Not Performed patient unavailable for treatment  (dialysis)

## 2024-12-16 NOTE — PROGRESS NOTES
[facility]     Progress Note    Patient Name: Nelson Wang  : 1945  MRN: [id number]  Primary Care Physician:  Domingo Bravo MD  Date of admission: 2024    Subjective   Subjective     Chief Complaint:   Follow-up for post cardiac arrest with hemoptysis, acute hypoxic respiratory failure, acute on chronic congestive diastolic heart failure, acute cardiogenic pulmonary edema, loculated right-sided pleural effusion, ESRD on hemodialysis, GASTON with home CPAP.    This morning,  Currently on 2 L nasal cannula  Family at bedside  Mentation slowly improving  Somnolent today but arousable  Tolerating tube feeds  BP soft  Remains on micafungin  Repeat blood cultures negative  Sodium trending down  No fevers      Objective   Objective     Vitals:   Temp:  [97.2 °F (36.2 °C)-99 °F (37.2 °C)] 97.7 °F (36.5 °C)  Heart Rate:  [] 111  Resp:  [18-20] 18  BP: ()/(35-93) 106/62  Flow (L/min) (Oxygen Therapy):  [1.5] 1.5    Physical Exam   Frail  Resting comfortably bed      Result Review    Result Review:  I have personally reviewed the results from the time of this admission to 2024 13:38 EST and agree with these findings:  [x]  Laboratory  [x]  Microbiology  [x]  Radiology  [x]  EKG/Telemetry   [x]  Cardiology/Vascular   []  Pathology  []  Old records  []  Other:  Most notable findings include:       Lab 24  0452 12/15/24  0445 24  0428 24  1416 24  0319 24  0417 24  0611 24  0431 24  0008 12/10/24  1814 12/10/24  1221 12/10/24  0620   WBC 6.51 8.31 9.35  --  8.98 7.97 9.57  --   --   --   --  5.77   HEMOGLOBIN 10.4* 10.7* 10.6*  --  11.5* 10.6* 11.4*  --   --   --   --  9.1*   HEMATOCRIT 35.1* 36.6* 34.8*  --  39.2 35.8* 38.1  --   --   --   --  30.5*   PLATELETS 141 153 165  --  182 146 148  --   --   --   --  124*   SODIUM 129* 134* 136  --  140 139 135*  135*  --  137 138   < > 136   POTASSIUM 4.3 4.2 3.7  --  4.1 4.1 3.9  3.9  --  3.8 3.9    < > 4.1   CHLORIDE 92* 94* 98  --  102 104 102  102  --  105 105   < > 103   CO2 18.7* 21.0* 25.1  --  24.9 20.8* 19.3*  19.3*  --  21.5* 22.7   < > 20.3*   BUN 88* 65* 43*  --  66* 38* 18  18  --  17 16   < > 17   CREATININE 6.03* 4.90* 3.00*  --  5.19* 3.32* 1.55*  1.55* 1.49* 1.61* 1.73*   < > 1.75*   GLUCOSE 216* 178* 242*  --  266* 172* 236*  236*  --  220* 181*   < > 188*   CALCIUM 9.5 9.7 9.4  --  10.4 10.2 9.7  9.7  --  9.2 8.9   < > 8.6   PHOSPHORUS 5.8* 4.8* 4.3  4.3 1.6* 2.2* 2.4* 2.8  --  2.4* 2.6   < > 2.7   TOTAL PROTEIN  --  7.3  --   --  8.0 7.9 9.0*  --   --   --   --   --    ALBUMIN  --  3.2* 3.4*  --  3.9 3.9 3.9  3.9  --  3.5 3.3*   < > 3.1*   GLOBULIN  --  4.1  --   --  4.1 4.0 5.1  --   --   --   --   --     < > = values in this interval not displayed.       Results for orders placed during the hospital encounter of 12/05/24    Adult Transthoracic Echo Complete W/ Cont if Necessary Per Protocol    Interpretation Summary    The quality of the study is limited due to limited views obtained    Left ventricular systolic function is normal. Calculated left ventricular EF = 57.8%    The right ventricular cavity is mildly dilated.    The left atrial cavity is mildly dilated.    There is mild calcification of the aortic valve no evidence of vegetation seen    Blood cultures with Candida and Staph epidermidis  Repeat blood cultures negative    EEG with no seizures.  Did have some slowing consistent with encephalopathy      Assessment & Plan   Assessment / Plan       Active Hospital Problems:  Active Hospital Problems    Diagnosis     **Hypoxia       Status post cardiac arrest secondary to hypoxia  Acute hypoxemic and hypercapnic respiratory failure require mechanical ventilation  Hemoptysis  Concern for intraparenchymal bleed   Status post thoracentesis  Candidemia  Acute on chronic hypoxic respiratory failure  Acute cardiogenic pulmonary edema  End-stage renal disease on dialysis leading to  heart failure  Acute diastolic heart failure with EF of 56-60  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  Wean O2 to keep SPO2 greater than 90%  -Continue frequent suctioning for airway clearance  -Continue midodrine 10 mg 3 times daily.   -Appreciate nephrology input.  HD timing per nephrology.  -Mental status slowly improving secondary to improvement in encephalopathy.    EEG was consistent with encephalopathy/generalized slowing.  Keppra discontinued.  -Head CT did not show any acute or obvious pathology.  If stagnates or no improvement in the over the next couple days, consider brain MRI  -On day 10 of micafungin for Candida fungemia.  Repeat cultures negative  -Completed vancomycin for Staph epidermidis bacteremia x  6 weeks of antibiotics for Staph epidermidis discitis.    -Trend H&H. Transfuse for hemoglobin less than 7  -Continue Brovana, Pulmicort and add DuoNeb.  -Bronchoscopy cultures negative so far.  -Tube feeds at goal per dietitian recommendations  -Continue aspiration precautions.  Elevate head of bed.  -Trend electrolytes and renal panel.  Replace electrolytes as needed.  -PT/OT on board.  Appreciate assistance.    VTE Prophylaxis:  Pharmacologic & mechanical VTE prophylaxis orders are present.    CODE STATUS:   Level Of Support Discussed With: Next of Kin (If No Surrogate)  Code Status (Patient has no pulse and is not breathing): No CPR (Do Not Attempt to Resuscitate)  Medical Interventions (Patient has pulse or is breathing): Full Support      Labs, imaging, microbiology, notes and medications personally reviewed  Discussed with primary    Electronically signed by Galileo Greenwood DO, 12/16/2024, 13:38 EST.  Electronically signed by BECKY Dubois, 12/16/24, 4:22 PM  EST.      I personally seen the patient reexamined the patient and did all the medical decision making as above and assumed care of this patient     Electronically signed by Galileo Greenwood DO, 12/16/24, 6:17 PM EST.

## 2024-12-16 NOTE — SIGNIFICANT NOTE
12/16/24 0854   Physical Therapy Time and Intention   Session Not Performed patient unavailable for evaluation  (Dialysis)

## 2024-12-16 NOTE — PLAN OF CARE
Goal Outcome Evaluation:               Patient was able to get some rest tonight with use of seroquel. Patient's family at bedside continuously. Monitoring closely for pain. Contact provider if pain medication needed. Talked with daughter and she is okay for him to have pain medication, she just does not want him to have too much that can mask his cognitive progress. Patient needs frequent monitoring of his position in the bed. He likes to get sideways and hang his legs off the side of the bed. Patient also favors the left side. Appears to be in pain when rolled to the right side. Vitals stable this shift. Blood pressure a little soft with MAPS of 65. Will continue care plan.

## 2024-12-16 NOTE — PROGRESS NOTES
[facility]   Hospitalist Progress Note  Date: 2024  Patient Name: Nelson Wang  : 1945  MRN: [id number]  Date of admission: 2024  Room/Bed: [facility]      Subjective   Subjective     Chief Complaint: Shortness of breath    Summary:    Nelson Wang is a 79 y.o. 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  Patient was started on supplemental oxygen to maintain his oxygen saturations.  He was given a dose of Bumex overnight.  His chest x-ray was consistent with bilateral pleural effusions.  Pulmonology was consulted for the bilateral pleural effusions and and nephrology consulted for possible volume overload.  Pulmonology was performing a thoracentesis this morning.  Thoracentesis was showing bloody fluid.  Patient suffered CODE BLUE. Likely bleeding into his airways causing hypoxic arrest.  He received CPR for 6 minutes and achieved ROSC.  He was transferred to the ICU, intubated and on blood pressure support.  Bronchoscopy was showing blood clots in his airways.  Patient was started on CRRT.  Patient's medical status is tenuous.  He is currently on 1 pressor. CRRT is removing fluid at a slow rate.  Patient started on micafungin on 2024.  Patient extubated on 2024.  Patient on high flow nasal cannula 30 L 25%.  Slowly weaning down on supplemental oxygen, breathing more comfortably.  Poor mental status still waxing and waning.  Patient has been transitioned over to hemodialysis, blood pressure tolerating.    Interval Followup:   Seen while on dialysis early this morning.  More awake more alert more responsive.  However struggling during dialysis pulling on his arms.  Discussed with patient's daughter, provided low-dose of Seroquel.  Later RRT called for complaints of chest pain.  EKG at the time with no concerning findings.  A  troponin was elevated at 130, however reviewing patient's chart cardiac catheterization performed 5/29/2024 shows mild nonobstructive coronary artery disease.  Troponins drawn earlier this admission also elevated 113 and 128.  Patient quickly with no further complaints of chest pain.  Son-in-law at bedside states patient did sleep better through the night.    Objective   Objective     Vitals:   Temp:  [97.2 °F (36.2 °C)-98.1 °F (36.7 °C)] 98.1 °F (36.7 °C)  Heart Rate:  [] 88  Resp:  [18] 18  BP: ()/(35-93) 129/49  Flow (L/min) (Oxygen Therapy):  [1.5] 1.5    Physical Exam   General: More awake and interactive on rounds, seen while on dialysis  Cardiovascular: RRR, no murmurs   Pulmonary: nasal cannula in place, coarse breath sounds  Gastrointestinal: Abdomen is soft, nondistended  Musculoskeletal: No lower extremity edema  Neuro: Sponsored to name, makes eye contact, spontaneously moving all 4 extremities    Result Review    Result Review:  I have personally reviewed these results:  [x]  Laboratory      Lab 12/16/24  0452 12/15/24  0445 12/14/24  0428 12/11/24  0611 12/11/24  0431 12/10/24  0620   WBC 6.51 8.31 9.35   < >  --  5.77   HEMOGLOBIN 10.4* 10.7* 10.6*   < >  --  9.1*   HEMATOCRIT 35.1* 36.6* 34.8*   < >  --  30.5*   PLATELETS 141 153 165   < >  --  124*   NEUTROS ABS 3.24 4.46 5.51   < >  --  3.57   IMMATURE GRANS (ABS) 0.05 0.08* 0.14*   < >  --  0.05   LYMPHS ABS 1.70 1.95 1.90   < >  --  1.14   MONOS ABS 1.04* 1.34* 1.30*   < >  --  0.71   EOS ABS 0.44* 0.43* 0.43*   < >  --  0.24   MCV 92.4 94.1 88.8   < >  --  91.3   LACTATE  --   --   --   --  2.2* 1.3    < > = values in this interval not displayed.         Lab 12/16/24  0452 12/15/24  0445 12/14/24  0428   SODIUM 129* 134* 136   POTASSIUM 4.3 4.2 3.7   CHLORIDE 92* 94* 98   CO2 18.7* 21.0* 25.1   ANION GAP 18.3* 19.0* 12.9   BUN 88* 65* 43*   CREATININE 6.03* 4.90* 3.00*   EGFR 8.9* 11.4* 20.6*   GLUCOSE 216* 178* 242*   CALCIUM 9.5  9.7 9.4   MAGNESIUM 2.3 2.2 2.0   PHOSPHORUS 5.8* 4.8* 4.3  4.3         Lab 12/15/24  0445 12/14/24  0428 12/13/24  0319 12/12/24  0417   TOTAL PROTEIN 7.3  --  8.0 7.9   ALBUMIN 3.2* 3.4* 3.9 3.9   GLOBULIN 4.1  --  4.1 4.0   ALT (SGPT) 17  --  20 23   AST (SGOT) 31  --  36 39   BILIRUBIN 0.9  --  1.0 1.4*   ALK PHOS 84  --  93 76         Lab 12/16/24  1024   HSTROP T 130*                     Lab 12/11/24  0431   PH, ARTERIAL 7.302*   PCO2, ARTERIAL 45.6*   PO2 ART 91.3   O2 SATURATION ART 96.1   FIO2 32   HCO3 ART 22.5   BASE EXCESS ART -4.0*     Brief Urine Lab Results  (Last result in the past 365 days)        Color   Clarity   Blood   Leuk Est   Nitrite   Protein   CREAT   Urine HCG        10/27/24 1417 Yellow   Cloudy   Negative   Trace   Negative   >=300 mg/dL (3+)                 [x]  Microbiology   Microbiology Results (last 10 days)       Procedure Component Value - Date/Time    Blood Culture - Blood, Blood, Central Line [[id number]]  (Normal) Collected: 12/07/24 1023    Lab Status: Final result Specimen: Blood, Central Line Updated: 12/12/24 1030     Blood Culture No growth at 5 days    Blood Culture - Blood, Blood, Arterial Line [[id number]]  (Normal) Collected: 12/07/24 1023    Lab Status: Final result Specimen: Blood, Arterial Line Updated: 12/12/24 1046     Blood Culture No growth at 5 days          [x]  Radiology  CT Head Without Contrast    Result Date: 12/13/2024  Impression: No acute intracranial process. Improving right posterior scalp hematoma. Electronically Signed: Margaret Hammond MD  12/13/2024 9:05 AM EST  Workstation ID: [id number]    XR Chest 1 View    Result Date: 12/13/2024  Impression: Removed feeding tube, otherwise no significant radiographic change since 12/11/2024. Electronically Signed: Lorne Bustamante MD  12/13/2024 8:06 AM EST  Workstation ID: [id number]    XR Abdomen KUB    Result Date: 12/11/2024  Impression: 1.Lines and tubes as above. 2.Indeterminate bowel gas pattern.  3.Subcutaneous emphysema overlies the right lower chest. Bilateral pleural effusions. Electronically Signed: Carlos Joiner MD  12/11/2024 7:27 AM EST  Workstation ID: [id number]    XR Chest 1 View    Result Date: 12/11/2024  1.Interval removal of right chest tube without evidence of pneumothorax. 2.Persistent pleural effusions with bibasilar consolidations, right worse than left. 3.Subcutaneous emphysema. Electronically Signed: Cristian Tiwari MD  12/11/2024 5:15 AM EST  Workstation ID: [id number]    XR Chest 1 View    Result Date: 12/10/2024  1.No evidence of pneumothorax. 2.Improved aeration at the left lung base. 3.Persistent infiltrate at the right lung base. 4.Subcutaneous emphysema. Electronically Signed: Cristian Tiwari MD  12/10/2024 2:10 AM EST  Workstation ID: [id number]    XR Chest 1 View    Result Date: 12/9/2024  Impression: 1. No change in subcutaneous emphysema. 2. Slight improvement in patchy bilateral airspace disease. 3. No change in right thoracostomy tube. No definite pneumothorax identified. Electronically Signed: Stephan Dey MD  12/9/2024 5:18 PM EST  Workstation ID: [id number]    XR Chest 1 View    Result Date: 12/9/2024  Impression: 1.Interval extubation. 2.Right-sided chest tube in unchanged position. No definite pneumothorax. 3.Patchy right greater than left airspace disease, similar to the prior examination. 4.Subcutaneous emphysema throughout the chest, right greater than left. This has mildly improved from the prior exam. Electronically Signed: Reynaldo Rangel  12/9/2024 9:35 AM EST  Workstation ID: [id number]   []  EKG/Telemetry   []  Cardiology/Vascular   []  Pathology  []  Old records  []  Other:    Assessment & Plan   Assessment / Plan     Status postcardiac arrest, secondary to hypoxia  Acute hypoxic/hypercapnic respiratory failure requiring mechanical ventilation  Hemoptysis  Altered mental status, concern for seizures  End-stage renal disease on dialysis  Acute on chronic diastolic  heart failure  Staph epidermidis cystitis on vancomycin  Type 2 diabetes  Atrial fibrillation on Eliquis  Candidemia    Plan:  Patient remains in the hospital for further care and management, remained stable on telemetry bed  Pulmonary critical care, nephrology consulted, appreciate assistance  Episode of chest pain while on dialysis.  EKG without acute changes.  Troponin of 130 appears similar to previous troponins, repeat troponin was ordered, discussed with nursing staff to obtain now  For concerns of seizure-like activity, started empirically on Keppra.  EEG returned with no seizure-like activity.  Will hold further doses of Keppra at this time  Try and hold any sedating medications as able, patient with several risk factors for encephalopathy, delirium  Continue scheduled melatonin this evening.  Seroquel will low-dose will be available as needed to help with sleep  Scheduling Tylenol for pain, as needed Aspercreme available  As needed doses of oxycodone have been intermittently used.  In discussion with daughter will not have available on more but at family request  Patient remains on dialysis schedule per nephrology  Patient continues on micafungin for Candida fungemia  Patient remains on vancomycin Staph epidermidis bacteremia.    Patient's blood cultures from 12/7/2024 remain no growth to date  Continue core track and tube feeds.  Will continue ongoing discussions with family about PEG tube.  Discussions thus far with family indicate as long as patient's mentation is improving they could be agreeable to PEG tube in the near future.    Discussed with RN.  Discussed with nephrologist, discussed with family at bedside    VTE Prophylaxis:  Pharmacologic & mechanical VTE prophylaxis orders are present.        CODE STATUS:   Level Of Support Discussed With: Next of Kin (If No Surrogate)  Code Status (Patient has no pulse and is not breathing): No CPR (Do Not Attempt to Resuscitate)  Medical Interventions (Patient  has pulse or is breathing): Full Support

## 2024-12-16 NOTE — NURSING NOTE
Duration of Treatment 3.5 Hours                                3 hr 30 min   Access Site AVF   Dialyzer Revaclear    mL/min   Dialysate Temperature (C) 36   Use Crit-Line? No   BFR-As tolerated to a maximum of: 400 mL/min   Prime Dialyzer With NS to Priming Volume? Yes   Dialysate Solution Bath: K+ = 3 mEq, CA = 2.5mg   Bicarb 30 meq   Na+ 137 meq   Fluid Removal: 2 liters                                  0     Pt completed 3 hr 30 min ordered treatment.  During treatment pt was very agitated and unable to lay still, Dr. Grant contacted regarding order for restraint on left arm, soon after Dr. Grant arrived on unit and order was obtained for bilateral soft wrist restraints only during dialysis.  Md also ordered dose of seroquel, see MAR.  Pt GARETH and then daughter present in room which along with medication seemed to calm pt down.  Restraints were removed at 1111.  Also during treatment pt BP dropped and fluid removal stopped and 100 ml bolus had to be given x2.  BP was taken every 15 min during treatment.  RRT was also called during this HD treatment due to pt c/o CP.  Rapid team arrived, ordered EKG, and labs.  Daughter aware of issues during treatment.  Last /62.  Report given to HIREN Mauricio. Clean dry dressing on L AVF site.  Pt does seem more calm at time treatment was completed.

## 2024-12-16 NOTE — PROGRESS NOTES
"Nutrition Services    Patient Name: Nelson Wang  YOB: 1945  MRN: [id number]  Admission date: 12/5/2024      CLINICAL NUTRITION ASSESSMENT      Reason for Assessment  EN Follow Up   H&P:  Past Medical History:   Diagnosis Date    Abnormal stress test     Anemia     IRON INFUSIONS WITH DIALYSIS    Anesthesia     WITH HIP REPLACEMENT DAUGHTER BELIEVES HAD SVT 2000    Ankle pain, right     Anxiety and depression     Arthritis     CKD (chronic kidney disease), stage IV     DIALYSIS [id number] SHILEY IN RIGHT CHEST    Diabetes     GERD (gastroesophageal reflux disease)     Gout     High cholesterol     History of peritoneal dialysis     HL (hearing loss)     Hyperkalemia     Hypertension     Hypothyroidism     Insomnia     Night terrors     Psoriasis     Psoriasis     Sleep apnea     Sleep walking     Thrombocytopenia     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    Vitiligo         Current Problems:   Active Hospital Problems    Diagnosis     **Hypoxia         Nutrition/Diet History         Narrative   Pt remains NPO and on EN. Tube feed at goal rate of 40 ml/hr and tolerated well, with no issues noted. Prosource BID given for additional protein. Continue nutrition interventions and RD to follow per protocol.      Anthropometrics        Current Height, Weight Height: 170.2 cm (67\")  Weight: 64.3 kg (141 lb 12.1 oz)   Current BMI Body mass index is 22.2 kg/m².   BMI Classification Normal range Healthy range for age 23-30   % IBW 95%   Adjusted Body Weight (ABW) N/A   Weight Hx  Wt Readings from Last 15 Encounters:   12/16/24 0547 64.3 kg (141 lb 12.1 oz)   12/13/24 0407 62.5 kg (137 lb 12.6 oz)   12/12/24 0500 69.3 kg (152 lb 12.5 oz)   12/09/24 0600 70.2 kg (154 lb 12.2 oz)   12/06/24 1228 70.7 kg (155 lb 13.8 oz)   12/05/24 1850 75.3 kg (166 lb 0.1 oz)   12/01/24 1701 71 kg (156 lb 8.4 oz)   11/22/24 1311 71.8 kg (158 lb 3.2 oz)   11/18/24 0351 67.9 kg (149 lb 11.1 oz)   11/17/24 0255 71.9 kg (158 " lb 8.2 oz)   11/16/24 0316 67.9 kg (149 lb 11.1 oz)   11/14/24 0611 73.9 kg (162 lb 14.7 oz)   11/13/24 0500 73 kg (161 lb)   11/12/24 0523 73.2 kg (161 lb 6 oz)   11/11/24 1200 72.8 kg (160 lb 7.9 oz)   11/10/24 0500 74.4 kg (164 lb 0.4 oz)   11/08/24 0640 73 kg (160 lb 14.4 oz)   11/07/24 0621 72.7 kg (160 lb 3.2 oz)   11/05/24 0600 68.9 kg (151 lb 14.4 oz)   11/04/24 1402 67 kg (147 lb 11.3 oz)   11/04/24 0410 67.9 kg (149 lb 11.1 oz)   11/01/24 0159 74.3 kg (163 lb 12.8 oz)   10/27/24 0700 72.5 kg (159 lb 13.3 oz)   10/27/24 0346 75.7 kg (166 lb 14.2 oz)   10/09/24 1402 75.7 kg (166 lb 12.8 oz)   10/08/24 1455 75.3 kg (166 lb 0.1 oz)   10/02/24 0812 77.5 kg (170 lb 13.7 oz)   09/28/24 0330 77.2 kg (170 lb 3.1 oz)   09/27/24 1659 76.2 kg (167 lb 15.9 oz)   09/26/24 0823 78.9 kg (174 lb)   09/16/24 1814 79.3 kg (174 lb 13.2 oz)   09/12/24 1344 77.1 kg (169 lb 15.6 oz)   08/30/24 0927 75.6 kg (166 lb 10.7 oz)   08/14/24 0922 76.7 kg (169 lb)   07/17/24 1116 77.2 kg (170 lb 3.2 oz)          Wt Change Observation I/O's reveal -5.3 L since admission (~12 #s)     Estimated/Assessed Needs  Estimated Needs based on: Current Body Weight 62.5 kg       Energy Requirements 25-30 kcal/kg   EST Needs (kcal/day) 2002-7389        Protein Requirements 1.2-1.5 g.kg   EST Daily Needs (g/day) 75-94       Fluid Requirements 1mL/kcal    Estimated Needs (mL/day) 1939-1435     Labs/Medications Phos low, receiving K phos repletion.         Pertinent Labs Reviewed.   Results from last 7 days   Lab Units 12/16/24  0452 12/15/24  0445 12/14/24  0428 12/13/24  0319 12/12/24  0417   SODIUM mmol/L 129* 134* 136 140 139   POTASSIUM mmol/L 4.3 4.2 3.7 4.1 4.1   CHLORIDE mmol/L 92* 94* 98 102 104   CO2 mmol/L 18.7* 21.0* 25.1 24.9 20.8*   BUN mg/dL 88* 65* 43* 66* 38*   CREATININE mg/dL 6.03* 4.90* 3.00* 5.19* 3.32*   CALCIUM mg/dL 9.5 9.7 9.4 10.4 10.2   BILIRUBIN mg/dL  --  0.9  --  1.0 1.4*   ALK PHOS U/L  --  84  --  93 76   ALT (SGPT) U/L   --  17  --  20 23   AST (SGOT) U/L  --  31  --  36 39   GLUCOSE mg/dL 216* 178* 242* 266* 172*     Results from last 7 days   Lab Units 12/16/24  0452 12/15/24  0445 12/14/24  0428   MAGNESIUM mg/dL 2.3 2.2 2.0   PHOSPHORUS mg/dL 5.8* 4.8* 4.3  4.3   HEMOGLOBIN g/dL 10.4* 10.7* 10.6*   HEMATOCRIT % 35.1* 36.6* 34.8*     COVID19   Date Value Ref Range Status   10/08/2024 Not Detected Not Detected - Ref. Range Final     Lab Results   Component Value Date    HGBA1C 5.90 (H) 11/09/2024         Pertinent Medications Reviewed.     Malnutrition Severity Assessment              Nutrition Diagnosis         Nutrition Dx Problem 1 Inadequate oral Intake related to Inability to consume sufficient energy as evidenced by intubated/sedated, NPO.     Nutrition Intervention           Current Nutrition Orders & Evaluation of Intake       Current PO Diet NPO Diet NPO Type: Strict NPO   Supplement Orders Placed This Encounter      Tube Feeding: Formula: Novasource Renal; Feeding Type: Continuous; Start at: 20 mL/hr; Then Advance By: 10 mL/hr; Every: 8 hours; To Goal Rate of: 40 mL/hr; Water Flush: Other; Other: 65; Every: 4 hours; Water Bolus: None      Dietary Nutrition Supplements ProSource No Carb; Neutral      Feeding Tube Insertion - Cortrak System           Nutrition Intervention/Prescription        Continue Novasource Renal @ 40 mL/hr, flush 65 mL q4h  (Provides: 1760 kcal, 80 g pro, 1015 mL (625 mL FW + 390 mL flush)    2. Continue Prosource BID  (provides 120 kcal, 30 g pro/day)        Medical Nutrition Therapy/Nutrition Education          Learner     Readiness N/A  N/A     Method     Response N/A  N/A     Monitor/Evaluation        Monitor PO intake, Pertinent labs, EN delivery/tolerance, GI status, Hemodynamic stability     Nutrition Discharge Plan         To be determined     Electronically signed by:  Stephie Garsia RD  12/16/24 13:10 EST

## 2024-12-17 LAB
ALBUMIN SERPL-MCNC: 2.7 G/DL (ref 3.5–5.2)
ANION GAP SERPL CALCULATED.3IONS-SCNC: 9.8 MMOL/L (ref 5–15)
BASOPHILS # BLD AUTO: 0.05 10*3/MM3 (ref 0–0.2)
BASOPHILS NFR BLD AUTO: 0.8 % (ref 0–1.5)
BUN SERPL-MCNC: 47 MG/DL (ref 8–23)
BUN/CREAT SERPL: 12.9 (ref 7–25)
CALCIUM SPEC-SCNC: 9.4 MG/DL (ref 8.6–10.5)
CHLORIDE SERPL-SCNC: 93 MMOL/L (ref 98–107)
CO2 SERPL-SCNC: 26.2 MMOL/L (ref 22–29)
CREAT SERPL-MCNC: 3.63 MG/DL (ref 0.76–1.27)
DEPRECATED RDW RBC AUTO: 62.6 FL (ref 37–54)
EGFRCR SERPLBLD CKD-EPI 2021: 16.4 ML/MIN/1.73
EOSINOPHIL # BLD AUTO: 0.43 10*3/MM3 (ref 0–0.4)
EOSINOPHIL NFR BLD AUTO: 6.6 % (ref 0.3–6.2)
ERYTHROCYTE [DISTWIDTH] IN BLOOD BY AUTOMATED COUNT: 21 % (ref 12.3–15.4)
GLUCOSE BLDC GLUCOMTR-MCNC: 149 MG/DL (ref 70–99)
GLUCOSE BLDC GLUCOMTR-MCNC: 159 MG/DL (ref 70–99)
GLUCOSE BLDC GLUCOMTR-MCNC: 99 MG/DL (ref 70–99)
GLUCOSE SERPL-MCNC: 159 MG/DL (ref 65–99)
HCT VFR BLD AUTO: 31.3 % (ref 37.5–51)
HGB BLD-MCNC: 9.3 G/DL (ref 13–17.7)
IMM GRANULOCYTES # BLD AUTO: 0.03 10*3/MM3 (ref 0–0.05)
IMM GRANULOCYTES NFR BLD AUTO: 0.5 % (ref 0–0.5)
LYMPHOCYTES # BLD AUTO: 1.43 10*3/MM3 (ref 0.7–3.1)
LYMPHOCYTES NFR BLD AUTO: 22.1 % (ref 19.6–45.3)
MAGNESIUM SERPL-MCNC: 2.2 MG/DL (ref 1.6–2.4)
MCH RBC QN AUTO: 26.6 PG (ref 26.6–33)
MCHC RBC AUTO-ENTMCNC: 29.7 G/DL (ref 31.5–35.7)
MCV RBC AUTO: 89.7 FL (ref 79–97)
MONOCYTES # BLD AUTO: 1 10*3/MM3 (ref 0.1–0.9)
MONOCYTES NFR BLD AUTO: 15.5 % (ref 5–12)
NEUTROPHILS NFR BLD AUTO: 3.53 10*3/MM3 (ref 1.7–7)
NEUTROPHILS NFR BLD AUTO: 54.5 % (ref 42.7–76)
NRBC BLD AUTO-RTO: 0 /100 WBC (ref 0–0.2)
PHOSPHATE SERPL-MCNC: 4.3 MG/DL (ref 2.5–4.5)
PLATELET # BLD AUTO: 149 10*3/MM3 (ref 140–450)
PMV BLD AUTO: 11.1 FL (ref 6–12)
POTASSIUM SERPL-SCNC: 4.2 MMOL/L (ref 3.5–5.2)
RBC # BLD AUTO: 3.49 10*6/MM3 (ref 4.14–5.8)
SODIUM SERPL-SCNC: 129 MMOL/L (ref 136–145)
WBC NRBC COR # BLD AUTO: 6.47 10*3/MM3 (ref 3.4–10.8)

## 2024-12-17 PROCEDURE — 80069 RENAL FUNCTION PANEL: CPT | Performed by: INTERNAL MEDICINE

## 2024-12-17 PROCEDURE — 97530 THERAPEUTIC ACTIVITIES: CPT

## 2024-12-17 PROCEDURE — 85025 COMPLETE CBC W/AUTO DIFF WBC: CPT | Performed by: STUDENT IN AN ORGANIZED HEALTH CARE EDUCATION/TRAINING PROGRAM

## 2024-12-17 PROCEDURE — 99233 SBSQ HOSP IP/OBS HIGH 50: CPT | Performed by: INTERNAL MEDICINE

## 2024-12-17 PROCEDURE — 94664 DEMO&/EVAL PT USE INHALER: CPT

## 2024-12-17 PROCEDURE — 25010000002 MICAFUNGIN SODIUM 100 MG RECONSTITUTED SOLUTION 1 EACH VIAL: Performed by: INTERNAL MEDICINE

## 2024-12-17 PROCEDURE — 82948 REAGENT STRIP/BLOOD GLUCOSE: CPT | Performed by: NURSE PRACTITIONER

## 2024-12-17 PROCEDURE — 82948 REAGENT STRIP/BLOOD GLUCOSE: CPT

## 2024-12-17 PROCEDURE — 94799 UNLISTED PULMONARY SVC/PX: CPT

## 2024-12-17 PROCEDURE — 83735 ASSAY OF MAGNESIUM: CPT | Performed by: INTERNAL MEDICINE

## 2024-12-17 PROCEDURE — 97161 PT EVAL LOW COMPLEX 20 MIN: CPT

## 2024-12-17 PROCEDURE — 63710000001 INSULIN LISPRO (HUMAN) PER 5 UNITS: Performed by: NURSE PRACTITIONER

## 2024-12-17 RX ORDER — ONDANSETRON 2 MG/ML
4 INJECTION INTRAMUSCULAR; INTRAVENOUS EVERY 4 HOURS PRN
Status: DISCONTINUED | OUTPATIENT
Start: 2024-12-17 | End: 2025-01-08 | Stop reason: HOSPADM

## 2024-12-17 RX ORDER — OXYCODONE HYDROCHLORIDE 5 MG/1
2.5 TABLET ORAL ONCE AS NEEDED
Status: COMPLETED | OUTPATIENT
Start: 2024-12-17 | End: 2024-12-17

## 2024-12-17 RX ORDER — LIDOCAINE 4 G/G
1 PATCH TOPICAL DAILY PRN
Status: DISCONTINUED | OUTPATIENT
Start: 2024-12-17 | End: 2025-01-08 | Stop reason: HOSPADM

## 2024-12-17 RX ORDER — FAMOTIDINE 20 MG/1
20 TABLET, FILM COATED ORAL DAILY
Status: DISCONTINUED | OUTPATIENT
Start: 2024-12-17 | End: 2024-12-22

## 2024-12-17 RX ORDER — ATORVASTATIN CALCIUM 40 MG/1
40 TABLET, FILM COATED ORAL DAILY
Status: DISCONTINUED | OUTPATIENT
Start: 2024-12-17 | End: 2024-12-30

## 2024-12-17 RX ORDER — QUETIAPINE FUMARATE 25 MG/1
12.5 TABLET, FILM COATED ORAL NIGHTLY
Status: DISCONTINUED | OUTPATIENT
Start: 2024-12-17 | End: 2024-12-20

## 2024-12-17 RX ADMIN — INSULIN LISPRO 2 UNITS: 100 INJECTION, SOLUTION INTRAVENOUS; SUBCUTANEOUS at 13:45

## 2024-12-17 RX ADMIN — BUDESONIDE 0.5 MG: 0.5 INHALANT ORAL at 20:20

## 2024-12-17 RX ADMIN — FAMOTIDINE 20 MG: 20 TABLET, FILM COATED ORAL at 09:44

## 2024-12-17 RX ADMIN — ARFORMOTEROL TARTRATE 15 MCG: 15 SOLUTION RESPIRATORY (INHALATION) at 20:19

## 2024-12-17 RX ADMIN — BUDESONIDE 0.5 MG: 0.5 INHALANT ORAL at 08:15

## 2024-12-17 RX ADMIN — WHITE PETROLATUM 1 APPLICATION: 1.75 OINTMENT TOPICAL at 09:44

## 2024-12-17 RX ADMIN — ACETAMINOPHEN 650 MG: 325 TABLET ORAL at 03:37

## 2024-12-17 RX ADMIN — Medication 10 ML: at 20:46

## 2024-12-17 RX ADMIN — ACETAMINOPHEN 650 MG: 325 TABLET ORAL at 09:43

## 2024-12-17 RX ADMIN — LEVOTHYROXINE SODIUM 175 MCG: 0.03 TABLET ORAL at 05:02

## 2024-12-17 RX ADMIN — Medication 10 ML: at 09:44

## 2024-12-17 RX ADMIN — OXYCODONE HYDROCHLORIDE 2.5 MG: 5 TABLET ORAL at 20:59

## 2024-12-17 RX ADMIN — ACETAMINOPHEN 650 MG: 325 TABLET ORAL at 21:00

## 2024-12-17 RX ADMIN — ACETAMINOPHEN 650 MG: 325 TABLET ORAL at 15:35

## 2024-12-17 RX ADMIN — ARFORMOTEROL TARTRATE 15 MCG: 15 SOLUTION RESPIRATORY (INHALATION) at 08:15

## 2024-12-17 RX ADMIN — QUETIAPINE FUMARATE 12.5 MG: 25 TABLET ORAL at 20:43

## 2024-12-17 RX ADMIN — Medication 2.5 MG: at 20:43

## 2024-12-17 RX ADMIN — MICAFUNGIN SODIUM 100 MG: 100 INJECTION, POWDER, LYOPHILIZED, FOR SOLUTION INTRAVENOUS at 09:44

## 2024-12-17 NOTE — PROGRESS NOTES
[facility]     Nephrology Progress Note      Patient Name: Nelson Wang  : 1945  MRN: [id number]  Primary Care Physician:  Domingo Bravo MD  Date of admission: 2024    Subjective   Subjective     Interval History:  No new events.  Still altered though more responsive.  Somewhat restless.      Objective   Objective     Vitals:   Temp:  [97.7 °F (36.5 °C)-98.2 °F (36.8 °C)] 97.9 °F (36.6 °C)  Heart Rate:  [] 113  Resp:  [18] 18  BP: ()/(35-93) 126/65  Flow (L/min) (Oxygen Therapy):  [1.5] 1.5  Physical Exam:   Constitutional: Altered, arousable.   Eyes: sclerae anicteric, no conjunctival injection   HENT: mucous membranes moist.  NG in place.   Neck: Supple, no thyromegaly, no lymphadenopathy, trachea midline, No JVD   Respiratory: Decreased to auscultation bilaterally, nonlabored respirations, some upper airway rhonchi.   Cardiovascular: RRR, no murmurs, rubs, or gallops.   Gastrointestinal: Positive bowel sounds, soft, nontender, nondistended   Musculoskeletal: No edema, no clubbing or cyanosis.  Left upper extremity AV fistula   Neurologic: Arousable, moving extremities, did not follow commands to me..  Inconsistent.  Restless.   Skin: warm and dry, no rashes     Result Review    Result Reviewed:  I have personally reviewed the results from the time of this admission to 2024 07:58 EST and agree with these findings:  [x]  Laboratory  []  Microbiology  [x]  Radiology  []  EKG/Telemetry   []  Cardiology/Vascular   []  Pathology  [x]  Old records  []  Other:        Lab 24  0554 24  0452 12/15/24  0445 24  0428 24  1416 24  0319 24  0417 24  0611 24  0431 24  0008 12/10/24  1814 12/10/24  1221   SODIUM 129* 129* 134* 136  --  140 139 135*  135*  --  137 138 138   POTASSIUM 4.2 4.3 4.2 3.7  --  4.1 4.1 3.9  3.9  --  3.8 3.9 3.9   CHLORIDE 93* 92* 94* 98  --  102 104 102  102  --  105 105 108*   CO2 26.2 18.7* 21.0* 25.1  --   24.9 20.8* 19.3*  19.3*  --  21.5* 22.7 21.5*   BUN 47* 88* 65* 43*  --  66* 38* 18  18  --  17 16 17   CREATININE 3.63* 6.03* 4.90* 3.00*  --  5.19* 3.32* 1.55*  1.55* 1.49* 1.61* 1.73* 1.68*   GLUCOSE 159* 216* 178* 242*  --  266* 172* 236*  236*  --  220* 181* 148*   EGFR 16.4* 8.9* 11.4* 20.6*  --  10.7* 18.3* 45.5*  45.5*  --  43.5* 39.9* 41.3*   ANION GAP 9.8 18.3* 19.0* 12.9  --  13.1 14.2 13.7  13.7  --  10.5 10.3 8.5   MAGNESIUM 2.2 2.3 2.2 2.0  --  3.1* 2.8* 2.5*  --   --   --   --    PHOSPHORUS 4.3 5.8* 4.8* 4.3  4.3 1.6* 2.2* 2.4* 2.8  --  2.4* 2.6 2.6             Assessment & Plan   Assessment / Plan       Active Hospital Problems:  Active Hospital Problems    Diagnosis     **Hypoxia        Assessment and Plan:    - ESRD, was on home dialysis. Off CRRT.  Off pressors, hemodynamically stable.  Left upper extremity AV fistula for access.  Electrolytes are stable.  Conservative bath.     - Volume overload, much better now.   Sodium on low side, reduced flushes to 30ml q4hrs.      - Aspiration pneumonia, and possibly recurrent aspiration, per pulmonary.      - Type 2 diabetes with complications.     - Hypotension blood pressure is acceptable now, on ProAmatine and blood pressure stable.     - Anemia of chronic kidney disease, hemoglobin 10.7 with goal greater than 10, on RONALD as outpatient.  Getting Epogen while here.     - Altered mentation.  Multifactorial.

## 2024-12-17 NOTE — THERAPY EVALUATION
Acute Care - Physical Therapy Initial Evaluation  BRIA Sullivan     Patient Name: Nelson Wang  : 1945  MRN: [id number]  Today's Date: 2024      Visit Dx:     ICD-10-CM ICD-9-CM   1. Acute respiratory failure with hypoxia  J96.01 518.81   2. Acute pulmonary edema  J81.0 518.4   3. Difficulty walking  R26.2 719.7     Patient Active Problem List   Diagnosis    Essential hypertension    Bradycardia, sinus    Anemia due to chronic kidney disease    Hypothyroidism    Idiopathic gout    Proteinuria    Psoriasis    Thrombocytopenia    Type 2 diabetes mellitus without complication    CKD (chronic kidney disease), stage IV    Hyperlipidemia    Monoclonal gammopathy of unknown significance (MGUS)    Stage 5 chronic kidney disease    Chronic gout without tophus    Peritoneal dialysis catheter dysfunction    Medicare annual wellness visit, subsequent    Chronic cough    Peritonitis associated with peritoneal dialysis    Recurrent pneumonia    Acute on chronic respiratory failure with hypoxia    End stage renal disease    Aspiration pneumonitis    Sepsis    Type 2 diabetes mellitus, with long-term current use of insulin    GERD without esophagitis    Immunosuppression due to drug therapy    Bipolar disorder    Chronic respiratory failure with hypoxia    Shortness of breath    Abnormal stress test    ESRD on dialysis    Abnormal chest CT    Encounter for screening for malignant neoplasm of colon    History of colon polyps    Family history of colon cancer    Intractable pain    Abdominal pain    ESRD (end stage renal disease) on dialysis    AMS (altered mental status)    Hallucinations    LUQ pain    Discitis    Metabolic encephalopathy    Aspiration pneumonia    Discitis of lumbar region    Severe malnutrition    Dementia    Unspecified severe protein-calorie malnutrition    Hypoxia     Past Medical History:   Diagnosis Date    Abnormal stress test     Anemia     IRON INFUSIONS WITH DIALYSIS    Anesthesia     WITH  HIP REPLACEMENT DAUGHTER BELIEVES HAD SVT 2000    Ankle pain, right     Anxiety and depression     Arthritis     CKD (chronic kidney disease), stage IV     DIALYSIS [id number] SHILEY IN RIGHT CHEST    Diabetes     GERD (gastroesophageal reflux disease)     Gout     High cholesterol     History of peritoneal dialysis     HL (hearing loss)     Hyperkalemia     Hypertension     Hypothyroidism     Insomnia     Night terrors     Psoriasis     Psoriasis     Sleep apnea     Sleep walking     Thrombocytopenia     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    Vitiligo      Past Surgical History:   Procedure Laterality Date    ABDOMINAL SURGERY      ANKLE SURGERY Right 11/1990    APPENDECTOMY N/A 1954    ARTERIOVENOUS FISTULA/SHUNT SURGERY Left 07/16/2024    Procedure: Creation of left arm arteriovenous fistula;  Surgeon: Moses Mir MD;  Location: [facility] MAIN OR;  Service: Vascular;  Laterality: Left;    BRONCHOSCOPY N/A 03/01/2024    Procedure: BRONCHOSCOPY WITH BAL AND WASHINGS;  Surgeon: Sandra Espinal MD;  Location: [facility] ENDOSCOPY;  Service: Pulmonary;  Laterality: N/A;  PNEUMONIA    BRONCHOSCOPY N/A 08/30/2024    Procedure: BRONCHOSCOPY WITH BALS AND WASHINGS;  Surgeon: Sandra Espinal MD;  Location: [facility] ENDOSCOPY;  Service: Pulmonary;  Laterality: N/A;  MUCOUS PLUGGING    CARDIAC CATHETERIZATION N/A 05/29/2024    Procedure: Left Heart Cath with possible coronary angioplasty;  Surgeon: Stephan Nichols MD;  Location: [facility] CATH INVASIVE LOCATION;  Service: Cardiology;  Laterality: N/A;    COLONOSCOPY N/A 06/2022    [facility]    ENDOSCOPY N/A 10/28/2024    Procedure: ESOPHAGOGASTRODUODENOSCOPY INSERTION OF LIGHTED INSTRUMENT TO VIEW ESOPHAGUS, STOMACH AND SMALL INTESTINE;  Surgeon: Kingsley Peraza MD;  Location: [facility] ENDOSCOPY;  Service: Gastroenterology;  Laterality: N/A;  Gastritis    FRACTURE SURGERY      HIP BIPOLAR REPLACEMENT Right 01/2000    INSERTION HEMODIALYSIS CATHETER Left  04/09/2024    Procedure: HEMODIALYSIS CATHETER INSERTION;  Surgeon: Jose Berry MD;  Location:  [facility] MAIN OR;  Service: General;  Laterality: Left;    INSERTION HEMODIALYSIS CATHETER N/A 04/12/2024    Procedure: Tunneled hemodialysis catheter insertion;  Surgeon: Enrique Vinson MD;  Location:  [facility] MAIN OR;  Service: Vascular;  Laterality: N/A;    INSERTION PERITONEAL DIALYSIS CATHETER N/A 03/27/2023    Procedure: LAPAROSCOPIC INSERTION PERITONEAL DIALYSIS CATHETER, LAPAROSCOPIC OMENTOPEXY WITH LYSIS OF ADHESIONS;  Surgeon: Jose Berry MD;  [facility];  Service: General;  Laterality: N/A;    INSERTION PERITONEAL DIALYSIS CATHETER Left 07/23/2023    Procedure: REVISION OF PERITONEAL DIALYSIS CATHETER;  Surgeon: Radha Oreilly MD;  [facility];  Service: General;  Laterality: Left;    JOINT REPLACEMENT      REMOVAL PERITONEAL DIALYSIS CATHETER N/A 04/09/2024    Procedure: REMOVAL PERITONEAL DIALYSIS CATHETER;  Surgeon: Jose Berry MD;  [facility];  Service: General;  Laterality: N/A;    RENAL BIOPSY Left 07/15/2022    UPPER GASTROINTESTINAL ENDOSCOPY      VASCULAR SURGERY      WRIST SURGERY      UNSURE WHICH SIDE DAUGHTER REPORTS HAD SEVERE  CUT FROM WINDOW AND THEY HAD TO DO RECONSTRUCTIVE SURGERY WITH VESSELS AND NERVES     PT Assessment (Last 12 Hours)       PT Evaluation and Treatment       Row Name 12/17/24 1400          Physical Therapy Time and Intention    Document Type evaluation  -AV     Mode of Treatment individual therapy;physical therapy  -AV       Row Name 12/17/24 1400          General Information    Patient Profile Reviewed yes  -AV     Prior Level of Function --  Patient confused at time of evaluation. Family at bedside assisting with prior level. Assist with ADLs. Ambulated short distances with RW. Has home O2 but unsure of amount.  -AV     Equipment Currently Used at Home walker, rolling  -AV     Existing  Precautions/Restrictions fall;oxygen therapy device and L/min  -AV       Row Name 12/17/24 1400          Living Environment    Current Living Arrangements home  -AV     People in Home spouse;child(laura), adult  -AV       Row Name 12/17/24 1400          Pain    Additional Documentation Pain Scale: FACES Pre/Post-Treatment (Group)  -AV       Row Name 12/17/24 1400          Pain Scale: FACES Pre/Post-Treatment    Pain: FACES Scale, Pretreatment 0-->no hurt  -AV     Posttreatment Pain Rating 0-->no hurt  -AV       Row Name 12/17/24 1400          Cognition    Orientation Status (Cognition) oriented to;person  -AV       Row Name 12/17/24 1400          Range of Motion (ROM)    Range of Motion bilateral lower extremities;ROM is WFL  -AV       Row Name 12/17/24 1400          Bed Mobility    Bed Mobility supine-sit  -AV     All Activities, Alpha (Bed Mobility) moderate assist (50% patient effort)  -AV     Comment, (Bed Mobility) Patient maintained sitting EOB for 10 minutes with contact guard-minimal assist  -AV       Row Name 12/17/24 1400          Transfers    Transfers sit-stand transfer;stand-sit transfer  -AV       Row Name 12/17/24 1400          Sit-Stand Transfer    Sit-Stand Alpha (Transfers) minimum assist (75% patient effort);1 person assist;1 person to manage equipment  -AV     Assistive Device (Sit-Stand Transfers) walker, front-wheeled  -AV       Row Name 12/17/24 1400          Stand-Sit Transfer    Stand-Sit Alpha (Transfers) minimum assist (75% patient effort);1 person assist;1 person to manage equipment  -AV     Assistive Device (Stand-Sit Transfers) walker, front-wheeled  -AV       Row Name 12/17/24 1400          Gait/Stairs (Locomotion)    Gait/Stairs Locomotion gait/ambulation independence;gait/ambulation assistive device;distance ambulated  -AV     Alpha Level (Gait) minimum assist (75% patient effort)  -AV     Assistive Device (Gait) walker, front-wheeled  -AV     Distance in  Feet (Gait) 5  bed to recliner  -AV     Deviations/Abnormal Patterns (Gait) gait speed decreased;stride length decreased  -AV     Bilateral Gait Deviations forward flexed posture  -AV       Row Name 12/17/24 1400          Safety Issues/Impairments Affecting Functional Mobility    Impairments Affecting Function (Mobility) balance;cognition;endurance/activity tolerance;strength  -AV       Row Name 12/17/24 1400          Balance    Balance Assessment standing dynamic balance  -AV     Dynamic Standing Balance minimal assist  -AV     Position/Device Used, Standing Balance supported;walker, front-wheeled  -AV       Row Name             Wound 11/15/24 1945 Right upper chest    Wound - Properties Group Placement Date: 11/15/24  -JR Placement Time: 1945 -JR Side: Right  -JR Orientation: upper  -JR Location: chest  -JR Primary Wound Type: Incision  -JR    Retired Wound - Properties Group Placement Date: 11/15/24  -JR Placement Time: 1945 -JR Side: Right  -JR Orientation: upper  -JR Location: chest  -JR Primary Wound Type: Incision  -JR    Retired Wound - Properties Group Placement Date: 11/15/24  -JR Placement Time: 1945 -JR Side: Right  -JR Orientation: upper  -JR Location: chest  -JR Primary Wound Type: Incision  -JR    Retired Wound - Properties Group Date first assessed: 11/15/24  -JR Time first assessed: 1945  -JR Side: Right  -JR Location: chest  -JR Primary Wound Type: Incision  -JR      Row Name             Wound 12/05/24 2331 Right lower leg abrasion    Wound - Properties Group Placement Date: 12/05/24  -AW Placement Time: 2331  -AW Side: Right  -AW Orientation: lower  -AW Location: leg  -AW Primary Wound Type: Abrasion  -AW Type: abrasion  -AW Present on Original Admission: Y  -AW    Retired Wound - Properties Group Placement Date: 12/05/24  -AW Placement Time: 2331  -AW Present on Original Admission: Y  -AW Side: Right  -AW Orientation: lower  -AW Location: leg  -AW Primary Wound Type: Abrasion  -AW Type:  abrasion  -AW    Retired Wound - Properties Group Placement Date: 12/05/24  -AW Placement Time: 2331  -AW Present on Original Admission: Y  -AW Side: Right  -AW Orientation: lower  -AW Location: leg  -AW Primary Wound Type: Abrasion  -AW Type: abrasion  -AW    Retired Wound - Properties Group Date first assessed: 12/05/24  -AW Time first assessed: 2331  -AW Present on Original Admission: Y  -AW Side: Right  -AW Location: leg  -AW Primary Wound Type: Abrasion  -AW Type: abrasion  -AW      Row Name             Wound 12/06/24 1440 Left lower leg skin tear    Wound - Properties Group Placement Date: 12/06/24  -KE Placement Time: 1440  -KE Side: Left  -KE Orientation: lower  -KE Location: leg  -KE Primary Wound Type: Skin tear  -KE Type: skin tear  -KE    Retired Wound - Properties Group Placement Date: 12/06/24  -KE Placement Time: 1440  -KE Side: Left  -KE Orientation: lower  -KE Location: leg  -KE Primary Wound Type: Skin tear  -KE Type: skin tear  -KE    Retired Wound - Properties Group Placement Date: 12/06/24  -KE Placement Time: 1440  -KE Side: Left  -KE Orientation: lower  -KE Location: leg  -KE Primary Wound Type: Skin tear  -KE Type: skin tear  -KE    Retired Wound - Properties Group Date first assessed: 12/06/24  -KE Time first assessed: 1440  -KE Side: Left  -KE Location: leg  -KE Primary Wound Type: Skin tear  -KE Type: skin tear  -KE      Row Name 12/17/24 1400          Plan of Care Review    Plan of Care Reviewed With patient;family  -AV     Progress no change  -AV     Outcome Evaluation Patient presents with deficits in balance, endurance, transfers, and ambulation. Patient will benefit from skilled PT services to address these mobility deficits and decrease risk of falls.  -AV       Row Name 12/17/24 1400          Positioning and Restraints    Pre-Treatment Position in bed  -AV     Post Treatment Position chair  -AV     In Chair reclined;call light within reach;encouraged to call for assist;exit alarm  on;with family/caregiver  -AV       Row Name 12/17/24 1400          Therapy Assessment/Plan (PT)    Rehab Potential (PT) good  -AV     Criteria for Skilled Interventions Met (PT) yes;meets criteria  -AV     Therapy Frequency (PT) daily  -AV     Predicted Duration of Therapy Intervention (PT) 10 days  -AV     Problem List (PT) problems related to;balance;cognition;mobility;strength  -AV     Activity Limitations Related to Problem List (PT) unable to transfer safely;unable to ambulate safely  -AV       Row Name 12/17/24 1400          PT Evaluation Complexity    History, PT Evaluation Complexity 1-2 personal factors and/or comorbidities  -AV     Examination of Body Systems (PT Eval Complexity) total of 4 or more elements  -AV     Clinical Presentation (PT Evaluation Complexity) stable  -AV     Clinical Decision Making (PT Evaluation Complexity) low complexity  -AV     Overall Complexity (PT Evaluation Complexity) low complexity  -AV       Row Name 12/17/24 1400          Therapy Plan Review/Discharge Plan (PT)    Therapy Plan Review (PT) evaluation/treatment results reviewed;patient  -AV       Row Name 12/17/24 1400          Physical Therapy Goals    Bed Mobility Goal Selection (PT) bed mobility, PT goal 1  -AV     Transfer Goal Selection (PT) transfer, PT goal 1  -AV     Gait Training Goal Selection (PT) gait training, PT goal 1  -AV       Row Name 12/17/24 1400          Bed Mobility Goal 1 (PT)    Activity/Assistive Device (Bed Mobility Goal 1, PT) sit to supine/supine to sit  -AV     [place] Level/Cues Needed (Bed Mobility Goal 1, PT) standby assist  -AV     Time Frame (Bed Mobility Goal 1, PT) 10 days  -AV       Row Name 12/17/24 1400          Transfer Goal 1 (PT)    Activity/Assistive Device (Transfer Goal 1, PT) sit-to-stand/stand-to-sit;bed-to-chair/chair-to-bed;walker, rolling  -AV     [place] Level/Cues Needed (Transfer Goal 1, PT) standby assist  -AV     Time Frame (Transfer Goal 1, PT) 10 days  -AV        Row Name 12/17/24 1400          Gait Training Goal 1 (PT)    Activity/Assistive Device (Gait Training Goal 1, PT) gait (walking locomotion);assistive device use;walker, rolling  -AV     [place] Level (Gait Training Goal 1, PT) standby assist  -AV     Distance (Gait Training Goal 1, PT) 60  -AV     Time Frame (Gait Training Goal 1, PT) 10 days  -AV               User Key  (r) = Recorded By, (t) = Taken By, (c) = Cosigned By      Initials Name Provider Type    Stan Esposito, RN Registered Nurse    Jackelin Freitas, RN Registered Nurse    Neena Weaver, RN Registered Nurse    Kaushik Ortez, PT Physical Therapist                    Physical Therapy Education       Title: PT OT SLP Therapies (In Progress)       Topic: Physical Therapy (In Progress)       Point: Mobility training (Done)       Learning Progress Summary            Patient Acceptance, E,TB, VU by AV at 12/17/2024 1454                      Point: Home exercise program (Not Started)       Learner Progress:  Not documented in this visit.              Point: Body mechanics (Done)       Learning Progress Summary            Patient Acceptance, E,TB, VU by AV at 12/17/2024 1454                      Point: Precautions (Done)       Learning Progress Summary            Patient Acceptance, E,TB, VU by AV at 12/17/2024 1454                                      User Key       Initials Effective Dates Name Provider Type Discipline     06/11/21 -  Kaushik Chavez, JAYME Physical Therapist PT                  PT Recommendation and Plan  Anticipated Discharge Disposition (PT): sub acute care setting  Planned Therapy Interventions (PT): balance training, bed mobility training, gait training, home exercise program, neuromuscular re-education, strengthening, transfer training  Therapy Frequency (PT): daily  Plan of Care Reviewed With: patient, family  Progress: no change  Outcome Evaluation: Patient presents with deficits in balance, endurance, transfers,  and ambulation. Patient will benefit from skilled PT services to address these mobility deficits and decrease risk of falls.   Outcome Measures       Row Name 12/17/24 1400             How much help from another person do you currently need...    Turning from your back to your side while in flat bed without using bedrails? 2  -AV      Moving from lying on back to sitting on the side of a flat bed without bedrails? 2  -AV      Moving to and from a bed to a chair (including a wheelchair)? 3  -AV      Standing up from a chair using your arms (e.g., wheelchair, bedside chair)? 3  -AV      Climbing 3-5 steps with a railing? 2  -AV      To walk in hospital room? 3  -AV      AM-PAC 6 Clicks Score (PT) 15  -AV         Functional Assessment    Outcome Measure Options AM-PAC 6 Clicks Basic Mobility (PT)  -AV                User Key  (r) = Recorded By, (t) = Taken By, (c) = Cosigned By      Initials Name Provider Type    AV Kaushik Chavez, PT Physical Therapist                     Time Calculation:    PT Charges       Row Name 12/17/24 1452             Time Calculation    PT Received On 12/17/24  -AV      PT Goal Re-Cert Due Date 12/26/24  -AV         Untimed Charges    PT Eval/Re-eval Minutes 50  -AV         Total Minutes    Untimed Charges Total Minutes 50  -AV       Total Minutes 50  -AV                User Key  (r) = Recorded By, (t) = Taken By, (c) = Cosigned By      Initials Name Provider Type    AV Kaushik Chavez, PT Physical Therapist                  Therapy Charges for Today       Code Description Service Date Service Provider Modifiers Qty    [id number] HC PT EVAL LOW COMPLEXITY 4 12/17/2024 Kaushik Chavez, PT GP 1            PT G-Codes  Outcome Measure Options: AM-PAC 6 Clicks Basic Mobility (PT)  AM-PAC 6 Clicks Score (PT): 15  AM-PAC 6 Clicks Score (OT): 6    Kaushik Chavez PT  12/17/2024

## 2024-12-17 NOTE — PLAN OF CARE
Goal Outcome Evaluation:  Plan of Care Reviewed With: patient, family        Progress: no change  Outcome Evaluation: Patient presents with deficits in balance, endurance, transfers, and ambulation. Patient will benefit from skilled PT services to address these mobility deficits and decrease risk of falls.    Anticipated Discharge Disposition (PT): sub acute care setting

## 2024-12-17 NOTE — PROGRESS NOTES
[facility]   Hospitalist Progress Note    Date of admission: 12/5/2024  Patient Name: Nelson Wang  1945  Date: 12/17/2024      Subjective     Chief Complaint   Patient presents with    Shortness of Breath       Interval Followup: Mentation doing a little better today compared to prior, family at bedside discussed case and update on questions.  They have been helping with reorientation feels like he is much more interactive as well as he is able to asked some appropriate questions today which he was not able to do recently.  Patient asking how his lungs are doing, denies feeling acute shortness of breath, no fevers, denies acute pain, feels a little unsteady, does need some redirection for answering questions appropriately working with therapy today      Objective     Vitals:   Temp:  [97.7 °F (36.5 °C)-98.2 °F (36.8 °C)] 97.7 °F (36.5 °C)  Heart Rate:  [] 95  Resp:  [18] 18  BP: ()/(40-77) 138/64  Flow (L/min) (Oxygen Therapy):  [1.5] 1.5    Physical Exam  Awake tired but conversant, has vitiligo  Alert to self, answering basic questions reasonably well today still needs redirection at times  NG in place  Faint crackles in right greater than left lower lung field no wheezing on nasal cannula minimal dyspnea  RRR no lower extremity pitting edema  Abdomen soft nontender  Generalized muscle wasting and weakness        Result Review:  Vital signs, labs and recent relevant imaging reviewed.      CBC          12/15/2024    04:45 12/16/2024    04:52 12/17/2024    05:54   CBC   WBC 8.31  6.51  6.47    RBC 3.89  3.80  3.49    Hemoglobin 10.7  10.4  9.3    Hematocrit 36.6  35.1  31.3    MCV 94.1  92.4  89.7    MCH 27.5  27.4  26.6    MCHC 29.2  29.6  29.7    RDW 22.1  21.8  21.0    Platelets 153  141  149      CMP          12/15/2024    04:45 12/16/2024    04:52 12/17/2024    05:54   CMP   Glucose 178  216  159    BUN 65  88  47    Creatinine 4.90  6.03  3.63    EGFR 11.4  8.9  16.4    Sodium 134  129   129    Potassium 4.2  4.3  4.2    Chloride 94  92  93    Calcium 9.7  9.5  9.4    Total Protein 7.3      Albumin 3.2   2.7    Globulin 4.1      Total Bilirubin 0.9      Alkaline Phosphatase 84      AST (SGOT) 31      ALT (SGPT) 17      Albumin/Globulin Ratio 0.8      BUN/Creatinine Ratio 13.3  14.6  12.9    Anion Gap 19.0  18.3  9.8          artificial tears    senna-docusate sodium **AND** polyethylene glycol **AND** [DISCONTINUED] bisacodyl **AND** bisacodyl    dextrose    dextrose    glucagon (human recombinant)    heparin (porcine)    heparin (porcine)    ipratropium-albuterol    Polyvinyl Alcohol-Povidone PF    QUEtiapine    sodium chloride    sodium chloride    sodium chloride    trolamine salicylate    acetaminophen, 650 mg, Nasogastric, Q6H  arformoterol, 15 mcg, Nebulization, BID - RT  [Held by provider] atorvastatin, 40 mg, Oral, Daily  budesonide, 0.5 mg, Nebulization, BID - RT  famotidine, 20 mg, Nasogastric, Daily  insulin lispro, 2-7 Units, Subcutaneous, Q6H  levothyroxine, 175 mcg, Nasogastric, Q AM  melatonin, 2.5 mg, Nasogastric, Nightly  micafungin (MYCAMINE) IV, 100 mg, Intravenous, Q24H  midodrine, 10 mg, Nasogastric, Once per day on Monday Wednesday Friday  mineral oil-hydrophilic petrolatum, 1 Application, Topical, Daily  sodium chloride, 10 mL, Intravenous, Q12H        CT Head Without Contrast    Result Date: 12/13/2024  Impression: No acute intracranial process. Improving right posterior scalp hematoma. Electronically Signed: Margaret Hammond MD  12/13/2024 9:05 AM EST  Workstation ID: [id number]    XR Chest 1 View    Result Date: 12/13/2024  Impression: Removed feeding tube, otherwise no significant radiographic change since 12/11/2024. Electronically Signed: Lorne Bustamante MD  12/13/2024 8:06 AM EST  Workstation ID: [id number]    XR Abdomen KUB    Result Date: 12/11/2024  Impression: 1.Lines and tubes as above. 2.Indeterminate bowel gas pattern. 3.Subcutaneous emphysema overlies the right  lower chest. Bilateral pleural effusions. Electronically Signed: Carlos Joiner MD  12/11/2024 7:27 AM EST  Workstation ID: [id number]    XR Chest 1 View    Result Date: 12/11/2024  1.Interval removal of right chest tube without evidence of pneumothorax. 2.Persistent pleural effusions with bibasilar consolidations, right worse than left. 3.Subcutaneous emphysema. Electronically Signed: Cristian Tiwari MD  12/11/2024 5:15 AM EST  Workstation ID: [id number]    XR Chest 1 View    Result Date: 12/10/2024  1.No evidence of pneumothorax. 2.Improved aeration at the left lung base. 3.Persistent infiltrate at the right lung base. 4.Subcutaneous emphysema. Electronically Signed: Cristian Tiwari MD  12/10/2024 2:10 AM EST  Workstation ID: [id number]    XR Chest 1 View    Result Date: 12/9/2024  Impression: 1. No change in subcutaneous emphysema. 2. Slight improvement in patchy bilateral airspace disease. 3. No change in right thoracostomy tube. No definite pneumothorax identified. Electronically Signed: Stephan Dey MD  12/9/2024 5:18 PM EST  Workstation ID: [id number]     Assessment / Plan     Summary: 79 y.o. ESRD on dialysis, type 2 diabetes, dementia, discitis on vancomycin, A-fib, restrictive lung disease who presents to the emergency department for evaluation of hypoxia and shortness of breath. Patient was started on supplemental oxygen to maintain his oxygen saturations. He was given a dose of Bumex overnight. His chest x-ray was consistent with bilateral pleural effusions. Pulmonology was consulted for the bilateral pleural effusions and and nephrology consulted for possible volume overload. Pulmonology was performing a thoracentesis this morning. Thoracentesis was showing bloody fluid. Patient suffered CODE BLUE. Likely bleeding into his airways causing hypoxic arrest. He received CPR for 6 minutes and achieved ROSC. He was transferred to the ICU, intubated and on blood pressure support. Bronchoscopy was showing blood  clots in his airways. Patient was started on CRRT. Patient's medical status is tenuous. He is currently on 1 pressor. CRRT is removing fluid at a slow rate. Patient started on micafungin on 12/7/2024. Patient extubated on 12/8/2024. Patient on high flow nasal cannula 30 L 25%. Slowly weaning down on supplemental oxygen, breathing more comfortably. Poor mental status still waxing and waning. Patient has been transitioned over to hemodialysis, blood pressure tolerating.     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  Tx'd for s epi bacteremia/discitis, apparently Dr. Sigala (ID in [place]) saw him and 6 week course would be completed 12/14   Cont iv micafungin for 14d course, from 12/5 positive culture repeat from central and arterial line from 12/7 negative   Cont midodrine on HD days and monitor bp closely   EEG from prior hospitalization 11/15 and 12/10 without seizure activity noted on limited study.  Keppra has been dc'd given concern of contribution to mentation.  Will continue to monitor without this  Continue respiratory hygiene, airway clearance, nebulizers/BPH, appreciate pulmonology assistance  Holding sedating medications, home sertraline 100 on hold, s/p seroquel x1  yesterday, did have notable agitation during dialysis yesterday but doing much better currently, will consider additional as needed 12.5 mg Seroquel doses if needed for agitation issues we will try to defer any scheduled dosing for now.  Family helping with reorientation delirium precautions. Monitor mentation closely.   Bun improving, continues with crrt/dialysis per nephrology, appreciate assistance.  Has been on RONALD outpt  Hb variable,no overt bleeding noted recently, monitor.    Continue tube feeds, aspiration precautions, with improving mentation will have speech reevaluate next couple of days to see if to be safe to start oral intake.  If not we will need further discussions regarding PEG tube currently family seems willing to proceed with this if mentation continues to show improvement but will hopefully be able to avoid if becomes safe enough to swallow   Hyponatremia worsening/similar to yesterday, decreasing free water and tube feeds  Continue with scheduled Tylenol course to try and minimize any sedating pain medications.  Lidocaine patch as needed, Voltaren.  Continue Synthroid  Continue SSI and monitor blood glucose  Continue famotidine  Continue PT/OT  Continue supportive care discussions with patient and family  Check a.m. CBC, BMP, magnesium, phosphorus  Continue hospitalization at current level of care    Dispo: Ultimately goal for going home with family support once medically stable.   D/w SW    VTE Prophylaxis:  Pharmacologic & mechanical VTE prophylaxis orders are present.      Level Of Support Discussed With: Next of Kin (If No Surrogate)  Code Status (Patient has no pulse and is not breathing): No CPR (Do Not Attempt to Resuscitate)  Medical Interventions (Patient has pulse or is breathing): Full Support

## 2024-12-17 NOTE — THERAPY TREATMENT NOTE
Patient Name: Nelson Wang  : 1945    MRN: [id number]                              Today's Date: 2024       Admit Date: 2024    Visit Dx:     ICD-10-CM ICD-9-CM   1. Acute respiratory failure with hypoxia  J96.01 518.81   2. Acute pulmonary edema  J81.0 518.4     Patient Active Problem List   Diagnosis    Essential hypertension    Bradycardia, sinus    Anemia due to chronic kidney disease    Hypothyroidism    Idiopathic gout    Proteinuria    Psoriasis    Thrombocytopenia    Type 2 diabetes mellitus without complication    CKD (chronic kidney disease), stage IV    Hyperlipidemia    Monoclonal gammopathy of unknown significance (MGUS)    Stage 5 chronic kidney disease    Chronic gout without tophus    Peritoneal dialysis catheter dysfunction    Medicare annual wellness visit, subsequent    Chronic cough    Peritonitis associated with peritoneal dialysis    Recurrent pneumonia    Acute on chronic respiratory failure with hypoxia    End stage renal disease    Aspiration pneumonitis    Sepsis    Type 2 diabetes mellitus, with long-term current use of insulin    GERD without esophagitis    Immunosuppression due to drug therapy    Bipolar disorder    Chronic respiratory failure with hypoxia    Shortness of breath    Abnormal stress test    ESRD on dialysis    Abnormal chest CT    Encounter for screening for malignant neoplasm of colon    History of colon polyps    Family history of colon cancer    Intractable pain    Abdominal pain    ESRD (end stage renal disease) on dialysis    AMS (altered mental status)    Hallucinations    LUQ pain    Discitis    Metabolic encephalopathy    Aspiration pneumonia    Discitis of lumbar region    Severe malnutrition    Dementia    Unspecified severe protein-calorie malnutrition    Hypoxia     Past Medical History:   Diagnosis Date    Abnormal stress test     Anemia     IRON INFUSIONS WITH DIALYSIS    Anesthesia     WITH HIP REPLACEMENT DAUGHTER BELIEVES HAD SVT      Ankle pain, right     Anxiety and depression     Arthritis     CKD (chronic kidney disease), stage IV     DIALYSIS [id number] SHILEY IN RIGHT CHEST    Diabetes     GERD (gastroesophageal reflux disease)     Gout     High cholesterol     History of peritoneal dialysis     HL (hearing loss)     Hyperkalemia     Hypertension     Hypothyroidism     Insomnia     Night terrors     Psoriasis     Psoriasis     Sleep apnea     Sleep walking     Thrombocytopenia     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    Vitiligo      Past Surgical History:   Procedure Laterality Date    ABDOMINAL SURGERY      ANKLE SURGERY Right 11/1990    APPENDECTOMY N/A 1954    ARTERIOVENOUS FISTULA/SHUNT SURGERY Left 07/16/2024    Procedure: Creation of left arm arteriovenous fistula;  Surgeon: Moses Mir MD;  Location: [facility] MAIN OR;  Service: Vascular;  Laterality: Left;    BRONCHOSCOPY N/A 03/01/2024    Procedure: BRONCHOSCOPY WITH BAL AND WASHINGS;  Surgeon: Sandra Espinal MD;  Location: [facility] ENDOSCOPY;  Service: Pulmonary;  Laterality: N/A;  PNEUMONIA    BRONCHOSCOPY N/A 08/30/2024    Procedure: BRONCHOSCOPY WITH BALS AND WASHINGS;  Surgeon: Sandra Espinal MD;  Location: [facility] ENDOSCOPY;  Service: Pulmonary;  Laterality: N/A;  MUCOUS PLUGGING    CARDIAC CATHETERIZATION N/A 05/29/2024    Procedure: Left Heart Cath with possible coronary angioplasty;  Surgeon: Stephan Nichols MD;  Location: [facility] CATH INVASIVE LOCATION;  Service: Cardiology;  Laterality: N/A;    COLONOSCOPY N/A 06/2022    [facility]    ENDOSCOPY N/A 10/28/2024    Procedure: ESOPHAGOGASTRODUODENOSCOPY INSERTION OF LIGHTED INSTRUMENT TO VIEW ESOPHAGUS, STOMACH AND SMALL INTESTINE;  Surgeon: Kingsley Peraza MD;  Location: [facility] ENDOSCOPY;  Service: Gastroenterology;  Laterality: N/A;  Gastritis    FRACTURE SURGERY      HIP BIPOLAR REPLACEMENT Right 01/2000    INSERTION HEMODIALYSIS CATHETER Left 04/09/2024    Procedure: HEMODIALYSIS CATHETER  INSERTION;  Surgeon: Jose Berry MD;  Location:  [facility] MAIN OR;  Service: General;  Laterality: Left;    INSERTION HEMODIALYSIS CATHETER N/A 04/12/2024    Procedure: Tunneled hemodialysis catheter insertion;  Surgeon: Enrique Vinson MD;  [facility] MA[facility];  Service: Vascular;  Laterality: N/A;    INSERTION PERITONEAL DIALYSIS CATHETER N/A 03/27/2023    Procedure: LAPAROSCOPIC INSERTION PERITONEAL DIALYSIS CATHETER, LAPAROSCOPIC OMENTOPEXY WITH LYSIS OF ADHESIONS;  Surgeon: Jose Berry MD;  [facility];  Service: General;  Laterality: N/A;    INSERTION PERITONEAL DIALYSIS CATHETER Left 07/23/2023    Procedure: REVISION OF PERITONEAL DIALYSIS CATHETER;  Surgeon: Radha Oreilly MD;  [facility];  Service: General;  Laterality: Left;    JOINT REPLACEMENT      REMOVAL PERITONEAL DIALYSIS CATHETER N/A 04/09/2024    Procedure: REMOVAL PERITONEAL DIALYSIS CATHETER;  Surgeon: oJse Berry MD;  Location: [facility] MAIN OR;  Service: General;  Laterality: N/A;    RENAL BIOPSY Left 07/15/2022    UPPER GASTROINTESTINAL ENDOSCOPY      VASCULAR SURGERY      WRIST SURGERY      UNSURE WHICH SIDE DAUGHTER REPORTS HAD SEVERE  CUT FROM WINDOW AND THEY HAD TO DO RECONSTRUCTIVE SURGERY WITH VESSELS AND NERVES      General Information       Row Name 12/17/24 1737          OT Time and Intention    Document Type therapy note (daily note)  -LF     Mode of Treatment individual therapy;occupational therapy  -               User Key  (r) = Recorded By, (t) = Taken By, (c) = Cosigned By      Initials Name Provider Type     Claudette Courtney OT Occupational Therapist                     Mobility/ADL's       Row Name 12/17/24 3710          Bed Mobility    Comment, (Bed Mobility) Pt seated on EOB with son in law with PT's assist on arrival.  -       Row Name 12/17/24 1613          Transfers    Transfers sit-stand transfer;stand-sit transfer;bed-chair transfer  -        Row Name 12/17/24 1341          Bed-Chair Transfer    Bed-Chair Levy (Transfers) minimum assist (75% patient effort);1 person assist;1 person to manage equipment  -LF     Assistive Device (Bed-Chair Transfers) walker, front-wheeled  -LF       Row Name 12/17/24 1341          Sit-Stand Transfer    Sit-Stand Levy (Transfers) minimum assist (75% patient effort);1 person assist;1 person to manage equipment  -LF     Assistive Device (Sit-Stand Transfers) walker, front-wheeled  -LF       Row Name 12/17/24 1341          Stand-Sit Transfer    Stand-Sit Levy (Transfers) minimum assist (75% patient effort);1 person assist;1 person to manage equipment  -LF     Assistive Device (Stand-Sit Transfers) walker, front-wheeled  -LF       Row Name 12/17/24 1341          Functional Mobility    Functional Mobility- Ind. Level minimum assist (75% patient effort);1 person + 1 person to manage equipment  -LF     Functional Mobility- Device walker, front-wheeled  -LF     Functional Mobility- Comment 3-4 steps from EOB to recliner, requiring max directional cues and assist advancing RW.  -LF               User Key  (r) = Recorded By, (t) = Taken By, (c) = Cosigned By      Initials Name Provider Type    LF Claudette Courtney OT Occupational Therapist                   Obj/Interventions       Row Name 12/17/24 1342          Balance    Balance Assessment sitting dynamic balance;standing dynamic balance  -LF     Dynamic Sitting Balance minimal assist  -LF     Position, Sitting Balance supported;sitting edge of bed  -LF     Dynamic Standing Balance minimal assist  -LF     Position/Device Used, Standing Balance supported;walker, front-wheeled  -LF     Balance Interventions sitting;standing;sit to stand;supported;dynamic;occupation based/functional task;weight shifting activity  -LF               User Key  (r) = Recorded By, (t) = Taken By, (c) = Cosigned By      Initials Name Provider Type    LF Claudette Courtney OT  Occupational Therapist                   Goals/Plan    No documentation.                  Clinical Impression       Row Name 12/17/24 1342          Pain Assessment    Additional Documentation Pain Scale: FACES Pre/Post-Treatment (Group)  -LF       Row Name 12/17/24 1342          Pain Scale: FACES Pre/Post-Treatment    Pain: FACES Scale, Pretreatment 0-->no hurt  -LF     Posttreatment Pain Rating 0-->no hurt  -LF       Row Name 12/17/24 1342          Plan of Care Review    Plan of Care Reviewed With patient;son  -     Progress improving  -     Outcome Evaluation Pt remains confused but somewhat more alert this session and agreeable for functional transfer/mobility training. He would benefit from continued OT services until safe d/c.  -LF       Row Name 12/17/24 1342          Vital Signs    O2 Delivery Pre Treatment nasal cannula  -     O2 Delivery Intra Treatment nasal cannula  -     O2 Delivery Post Treatment nasal cannula  -LF       Row Name 12/17/24 1342          Positioning and Restraints    Pre-Treatment Position in bed  -     Post Treatment Position chair  -     In Chair reclined;call light within reach;encouraged to call for assist;exit alarm on;with family/caregiver  -               User Key  (r) = Recorded By, (t) = Taken By, (c) = Cosigned By      Initials Name Provider Type    LF Claudette Courtney, OT Occupational Therapist                   Outcome Measures       Row Name 12/17/24 1344          How much help from another is currently needed...    Putting on and taking off regular lower body clothing? 1  -LF     Bathing (including washing, rinsing, and drying) 1  -LF     Toileting (which includes using toilet bed pan or urinal) 1  -LF     Putting on and taking off regular upper body clothing 1  -LF     Taking care of personal grooming (such as brushing teeth) 1  -LF     Eating meals 1  -LF     AM-PAC 6 Clicks Score (OT) 6  -LF       Row Name 12/17/24 1348          Functional Assessment     Outcome Measure Options AM-PAC 6 Clicks Daily Activity (OT);Optimal Instrument  -LF       Row Name 12/17/24 1343          Optimal Instrument    Optimal Instrument Optimal - 3  -LF     Bending/Stooping 4  -LF     Standing 2  -LF     Reaching 2  -LF     From the list, choose the 3 activities you would most like to be able to do without any difficulty Bending/stooping;Standing;Reaching  -LF     Total Score Optimal - 3 8  -LF               User Key  (r) = Recorded By, (t) = Taken By, (c) = Cosigned By      Initials Name Provider Type    LF Claudette Courtney OT Occupational Therapist                    Occupational Therapy Education       Title: PT OT SLP Therapies (In Progress)       Topic: Occupational Therapy (In Progress)       Point: ADL training (In Progress)       Description:   Instruct learner(s) on proper safety adaptation and remediation techniques during self care or transfers.   Instruct in proper use of assistive devices.                  Learning Progress Summary            Patient Acceptance, E, NL by PG at 12/13/2024 0945                      Point: Home exercise program (In Progress)       Description:   Instruct learner(s) on appropriate technique for monitoring, assisting and/or progressing therapeutic exercises/activities.                  Learning Progress Summary            Patient Acceptance, E, NL by PG at 12/13/2024 0945                      Point: Precautions (In Progress)       Description:   Instruct learner(s) on prescribed precautions during self-care and functional transfers.                  Learning Progress Summary            Patient Acceptance, E, NL by PG at 12/13/2024 0945                      Point: Body mechanics (In Progress)       Description:   Instruct learner(s) on proper positioning and spine alignment during self-care, functional mobility activities and/or exercises.                  Learning Progress Summary            Patient Acceptance, E, NL by PG at 12/13/2024 0945                                       User Key       Initials Effective Dates Name Provider Type Discipline    PG 06/16/21 -  Chauncey Goldberg OT Occupational Therapist OT                  OT Recommendation and Plan     Plan of Care Review  Plan of Care Reviewed With: patient, son  Progress: improving  Outcome Evaluation: Pt remains confused but somewhat more alert this session and agreeable for functional transfer/mobility training. He would benefit from continued OT services until safe d/c.     Time Calculation:         Time Calculation- OT       Row Name 12/17/24 1343             Time Calculation- OT    OT Received On 12/17/24  -LF      OT Goal Re-Cert Due Date 12/22/24  -LF         Timed Charges    42750 - OT Therapeutic Activity Minutes 10  -LF         Total Minutes    Timed Charges Total Minutes 10  -LF       Total Minutes 10  -LF                User Key  (r) = Recorded By, (t) = Taken By, (c) = Cosigned By      Initials Name Provider Type    LF Claudette Courtney OT Occupational Therapist                  Therapy Charges for Today       Code Description Service Date Service Provider Modifiers Qty    [id number] HC OT THERAPEUTIC ACT EA 15 MIN 12/17/2024 Claudette Courtney OT GO 1                 Claudette Courtney OT  12/17/2024

## 2024-12-17 NOTE — PLAN OF CARE
Goal Outcome Evaluation:              Outcome Evaluation: VSS, Confused. Tube feeds

## 2024-12-18 ENCOUNTER — APPOINTMENT (OUTPATIENT)
Dept: GENERAL RADIOLOGY | Facility: HOSPITAL | Age: 79
End: 2024-12-18
Payer: MEDICARE

## 2024-12-18 LAB
ANION GAP SERPL CALCULATED.3IONS-SCNC: 9.7 MMOL/L (ref 5–15)
BASOPHILS # BLD AUTO: 0.03 10*3/MM3 (ref 0–0.2)
BASOPHILS NFR BLD AUTO: 0.5 % (ref 0–1.5)
BUN SERPL-MCNC: 72 MG/DL (ref 8–23)
BUN/CREAT SERPL: 14.3 (ref 7–25)
CALCIUM SPEC-SCNC: 8.9 MG/DL (ref 8.6–10.5)
CHLORIDE SERPL-SCNC: 93 MMOL/L (ref 98–107)
CO2 SERPL-SCNC: 24.3 MMOL/L (ref 22–29)
CREAT SERPL-MCNC: 5.02 MG/DL (ref 0.76–1.27)
DEPRECATED RDW RBC AUTO: 61.1 FL (ref 37–54)
EGFRCR SERPLBLD CKD-EPI 2021: 11.1 ML/MIN/1.73
EOSINOPHIL # BLD AUTO: 0.44 10*3/MM3 (ref 0–0.4)
EOSINOPHIL NFR BLD AUTO: 7.8 % (ref 0.3–6.2)
ERYTHROCYTE [DISTWIDTH] IN BLOOD BY AUTOMATED COUNT: 20 % (ref 12.3–15.4)
GLUCOSE BLDC GLUCOMTR-MCNC: 112 MG/DL (ref 70–99)
GLUCOSE BLDC GLUCOMTR-MCNC: 128 MG/DL (ref 70–99)
GLUCOSE BLDC GLUCOMTR-MCNC: 128 MG/DL (ref 70–99)
GLUCOSE BLDC GLUCOMTR-MCNC: 140 MG/DL (ref 70–99)
GLUCOSE SERPL-MCNC: 151 MG/DL (ref 65–99)
HCT VFR BLD AUTO: 27.5 % (ref 37.5–51)
HGB BLD-MCNC: 8.3 G/DL (ref 13–17.7)
IMM GRANULOCYTES # BLD AUTO: 0.03 10*3/MM3 (ref 0–0.05)
IMM GRANULOCYTES NFR BLD AUTO: 0.5 % (ref 0–0.5)
LYMPHOCYTES # BLD AUTO: 1.17 10*3/MM3 (ref 0.7–3.1)
LYMPHOCYTES NFR BLD AUTO: 20.8 % (ref 19.6–45.3)
MAGNESIUM SERPL-MCNC: 2.2 MG/DL (ref 1.6–2.4)
MCH RBC QN AUTO: 27.1 PG (ref 26.6–33)
MCHC RBC AUTO-ENTMCNC: 30.2 G/DL (ref 31.5–35.7)
MCV RBC AUTO: 89.9 FL (ref 79–97)
MONOCYTES # BLD AUTO: 0.55 10*3/MM3 (ref 0.1–0.9)
MONOCYTES NFR BLD AUTO: 9.8 % (ref 5–12)
NEUTROPHILS NFR BLD AUTO: 3.4 10*3/MM3 (ref 1.7–7)
NEUTROPHILS NFR BLD AUTO: 60.6 % (ref 42.7–76)
NRBC BLD AUTO-RTO: 0 /100 WBC (ref 0–0.2)
NT-PROBNP SERPL-MCNC: ABNORMAL PG/ML (ref 0–1800)
PHOSPHATE SERPL-MCNC: 6.1 MG/DL (ref 2.5–4.5)
PLATELET # BLD AUTO: 153 10*3/MM3 (ref 140–450)
PMV BLD AUTO: 10.8 FL (ref 6–12)
POTASSIUM SERPL-SCNC: 4.1 MMOL/L (ref 3.5–5.2)
PROCALCITONIN SERPL-MCNC: 0.61 NG/ML (ref 0–0.25)
RBC # BLD AUTO: 3.06 10*6/MM3 (ref 4.14–5.8)
SODIUM SERPL-SCNC: 127 MMOL/L (ref 136–145)
WBC NRBC COR # BLD AUTO: 5.62 10*3/MM3 (ref 3.4–10.8)

## 2024-12-18 PROCEDURE — 84145 PROCALCITONIN (PCT): CPT | Performed by: INTERNAL MEDICINE

## 2024-12-18 PROCEDURE — 97110 THERAPEUTIC EXERCISES: CPT

## 2024-12-18 PROCEDURE — 80048 BASIC METABOLIC PNL TOTAL CA: CPT | Performed by: INTERNAL MEDICINE

## 2024-12-18 PROCEDURE — 82948 REAGENT STRIP/BLOOD GLUCOSE: CPT | Performed by: NURSE PRACTITIONER

## 2024-12-18 PROCEDURE — 94799 UNLISTED PULMONARY SVC/PX: CPT

## 2024-12-18 PROCEDURE — 83880 ASSAY OF NATRIURETIC PEPTIDE: CPT | Performed by: INTERNAL MEDICINE

## 2024-12-18 PROCEDURE — 85025 COMPLETE CBC W/AUTO DIFF WBC: CPT | Performed by: STUDENT IN AN ORGANIZED HEALTH CARE EDUCATION/TRAINING PROGRAM

## 2024-12-18 PROCEDURE — 82948 REAGENT STRIP/BLOOD GLUCOSE: CPT

## 2024-12-18 PROCEDURE — 25010000002 MICAFUNGIN SODIUM 100 MG RECONSTITUTED SOLUTION 1 EACH VIAL: Performed by: INTERNAL MEDICINE

## 2024-12-18 PROCEDURE — 99232 SBSQ HOSP IP/OBS MODERATE 35: CPT | Performed by: INTERNAL MEDICINE

## 2024-12-18 PROCEDURE — 84100 ASSAY OF PHOSPHORUS: CPT | Performed by: INTERNAL MEDICINE

## 2024-12-18 PROCEDURE — 99233 SBSQ HOSP IP/OBS HIGH 50: CPT | Performed by: INTERNAL MEDICINE

## 2024-12-18 PROCEDURE — 71045 X-RAY EXAM CHEST 1 VIEW: CPT

## 2024-12-18 PROCEDURE — 97530 THERAPEUTIC ACTIVITIES: CPT

## 2024-12-18 PROCEDURE — 92610 EVALUATE SWALLOWING FUNCTION: CPT

## 2024-12-18 PROCEDURE — 97116 GAIT TRAINING THERAPY: CPT

## 2024-12-18 PROCEDURE — 83735 ASSAY OF MAGNESIUM: CPT | Performed by: INTERNAL MEDICINE

## 2024-12-18 RX ORDER — QUETIAPINE FUMARATE 25 MG/1
12.5 TABLET, FILM COATED ORAL ONCE
Status: COMPLETED | OUTPATIENT
Start: 2024-12-18 | End: 2024-12-18

## 2024-12-18 RX ADMIN — MICAFUNGIN SODIUM 100 MG: 100 INJECTION, POWDER, LYOPHILIZED, FOR SOLUTION INTRAVENOUS at 10:09

## 2024-12-18 RX ADMIN — ACETAMINOPHEN 650 MG: 325 TABLET ORAL at 19:06

## 2024-12-18 RX ADMIN — Medication 10 ML: at 19:38

## 2024-12-18 RX ADMIN — ARFORMOTEROL TARTRATE 15 MCG: 15 SOLUTION RESPIRATORY (INHALATION) at 08:23

## 2024-12-18 RX ADMIN — WHITE PETROLATUM 1 APPLICATION: 1.75 OINTMENT TOPICAL at 10:55

## 2024-12-18 RX ADMIN — BUDESONIDE 0.5 MG: 0.5 INHALANT ORAL at 08:23

## 2024-12-18 RX ADMIN — LEVOTHYROXINE SODIUM 175 MCG: 0.03 TABLET ORAL at 06:19

## 2024-12-18 RX ADMIN — ACETAMINOPHEN 650 MG: 325 TABLET ORAL at 03:14

## 2024-12-18 RX ADMIN — QUETIAPINE FUMARATE 12.5 MG: 25 TABLET ORAL at 19:38

## 2024-12-18 RX ADMIN — ACETAMINOPHEN 650 MG: 325 TABLET ORAL at 10:08

## 2024-12-18 RX ADMIN — FAMOTIDINE 20 MG: 20 TABLET, FILM COATED ORAL at 10:08

## 2024-12-18 RX ADMIN — BUDESONIDE 0.5 MG: 0.5 INHALANT ORAL at 21:10

## 2024-12-18 RX ADMIN — QUETIAPINE FUMARATE 12.5 MG: 25 TABLET ORAL at 13:18

## 2024-12-18 RX ADMIN — Medication 10 ML: at 10:25

## 2024-12-18 RX ADMIN — Medication 2.5 MG: at 19:38

## 2024-12-18 RX ADMIN — MIDODRINE HYDROCHLORIDE 10 MG: 10 TABLET ORAL at 10:08

## 2024-12-18 RX ADMIN — ARFORMOTEROL TARTRATE 15 MCG: 15 SOLUTION RESPIRATORY (INHALATION) at 21:09

## 2024-12-18 NOTE — THERAPY EVALUATION
Acute Care - Speech Language Pathology   Swallow Initial Evaluation BRIA Sullivan     Patient Name: Nelson Wang  : 1945  MRN: [id number]  Today's Date: 2024               Admit Date: 2024    Visit Dx:     ICD-10-CM ICD-9-CM   1. Acute respiratory failure with hypoxia  J96.01 518.81   2. Acute pulmonary edema  J81.0 518.4   3. Difficulty walking  R26.2 719.7   4. Oropharyngeal dysphagia  R13.12 787.22     Patient Active Problem List   Diagnosis    Essential hypertension    Bradycardia, sinus    Anemia due to chronic kidney disease    Hypothyroidism    Idiopathic gout    Proteinuria    Psoriasis    Thrombocytopenia    Type 2 diabetes mellitus without complication    CKD (chronic kidney disease), stage IV    Hyperlipidemia    Monoclonal gammopathy of unknown significance (MGUS)    Stage 5 chronic kidney disease    Chronic gout without tophus    Peritoneal dialysis catheter dysfunction    Medicare annual wellness visit, subsequent    Chronic cough    Peritonitis associated with peritoneal dialysis    Recurrent pneumonia    Acute on chronic respiratory failure with hypoxia    End stage renal disease    Aspiration pneumonitis    Sepsis    Type 2 diabetes mellitus, with long-term current use of insulin    GERD without esophagitis    Immunosuppression due to drug therapy    Bipolar disorder    Chronic respiratory failure with hypoxia    Shortness of breath    Abnormal stress test    ESRD on dialysis    Abnormal chest CT    Encounter for screening for malignant neoplasm of colon    History of colon polyps    Family history of colon cancer    Intractable pain    Abdominal pain    ESRD (end stage renal disease) on dialysis    AMS (altered mental status)    Hallucinations    LUQ pain    Discitis    Metabolic encephalopathy    Aspiration pneumonia    Discitis of lumbar region    Severe malnutrition    Dementia    Unspecified severe protein-calorie malnutrition    Hypoxia     Past Medical History:   Diagnosis  Date    Abnormal stress test     Anemia     IRON INFUSIONS WITH DIALYSIS    Anesthesia     WITH HIP REPLACEMENT DAUGHTER BELIEVES HAD SVT 2000    Ankle pain, right     Anxiety and depression     Arthritis     CKD (chronic kidney disease), stage IV     DIALYSIS [id number] SHILEY IN RIGHT CHEST    Diabetes     GERD (gastroesophageal reflux disease)     Gout     High cholesterol     History of peritoneal dialysis     HL (hearing loss)     Hyperkalemia     Hypertension     Hypothyroidism     Insomnia     Night terrors     Psoriasis     Psoriasis     Sleep apnea     Sleep walking     Thrombocytopenia     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    Vitiligo      Past Surgical History:   Procedure Laterality Date    ABDOMINAL SURGERY      ANKLE SURGERY Right 11/1990    APPENDECTOMY N/A 1954    ARTERIOVENOUS FISTULA/SHUNT SURGERY Left 07/16/2024    Procedure: Creation of left arm arteriovenous fistula;  Surgeon: oMses Mir MD;  Location: [facility] MAIN OR;  Service: Vascular;  Laterality: Left;    BRONCHOSCOPY N/A 03/01/2024    Procedure: BRONCHOSCOPY WITH BAL AND WASHINGS;  Surgeon: Sandra Espinal MD;  Location: [facility] ENDOSCOPY;  Service: Pulmonary;  Laterality: N/A;  PNEUMONIA    BRONCHOSCOPY N/A 08/30/2024    Procedure: BRONCHOSCOPY WITH BALS AND WASHINGS;  Surgeon: Sandra Espinal MD;  Location: [facility] ENDOSCOPY;  Service: Pulmonary;  Laterality: N/A;  MUCOUS PLUGGING    CARDIAC CATHETERIZATION N/A 05/29/2024    Procedure: Left Heart Cath with possible coronary angioplasty;  Surgeon: Stephan Nichols MD;  Location: [facility] CATH INVASIVE LOCATION;  Service: Cardiology;  Laterality: N/A;    COLONOSCOPY N/A 06/2022    [facility]    ENDOSCOPY N/A 10/28/2024    Procedure: ESOPHAGOGASTRODUODENOSCOPY INSERTION OF LIGHTED INSTRUMENT TO VIEW ESOPHAGUS, STOMACH AND SMALL INTESTINE;  Surgeon: Kingsley Peraza MD;  Location: [facility] ENDOSCOPY;  Service: Gastroenterology;  Laterality: N/A;  Gastritis    FRACTURE  SURGERY      HIP BIPOLAR REPLACEMENT Right 01/2000    INSERTION HEMODIALYSIS CATHETER Left 04/09/2024    Procedure: HEMODIALYSIS CATHETER INSERTION;  Surgeon: Jose Berry MD;  Location: [facility] MAIN OR;  Service: General;  Laterality: Left;    INSERTION HEMODIALYSIS CATHETER N/A 04/12/2024    Procedure: Tunneled hemodialysis catheter insertion;  Surgeon: Enrique Vinson MD;  Location: [facility] MAIN OR;  Service: Vascular;  Laterality: N/A;    INSERTION PERITONEAL DIALYSIS CATHETER N/A 03/27/2023    Procedure: LAPAROSCOPIC INSERTION PERITONEAL DIALYSIS CATHETER, LAPAROSCOPIC OMENTOPEXY WITH LYSIS OF ADHESIONS;  Surgeon: Jose Berry MD;  Location: [facility] MAIN OR;  Service: General;  Laterality: N/A;    INSERTION PERITONEAL DIALYSIS CATHETER Left 07/23/2023    Procedure: REVISION OF PERITONEAL DIALYSIS CATHETER;  Surgeon: Radha Oreilly MD;  Location: [facility] MAIN OR;  Service: General;  Laterality: Left;    JOINT REPLACEMENT      REMOVAL PERITONEAL DIALYSIS CATHETER N/A 04/09/2024    Procedure: REMOVAL PERITONEAL DIALYSIS CATHETER;  Surgeon: Jose Berry MD;  Location: [facility] MAIN OR;  Service: General;  Laterality: N/A;    RENAL BIOPSY Left 07/15/2022    UPPER GASTROINTESTINAL ENDOSCOPY      VASCULAR SURGERY      WRIST SURGERY      UNSURE WHICH SIDE DAUGHTER REPORTS HAD SEVERE  CUT FROM WINDOW AND THEY HAD TO DO RECONSTRUCTIVE SURGERY WITH VESSELS AND NERVES           Inpatient Speech Pathology Dysphagia Evaluation        PAIN SCALE: None indicated.    PRECAUTIONS/CONTRAINDICATIONS: Standard    SUSPECTED ABUSE/NEGLECT/EXPLOITATION: None indicated.    SOCIAL/PSYCHOLOGICAL NEEDS/BARRIERS: None indicated.    PAST SOCIAL HISTORY:  79-year-old male lives at home with family    PRIOR FUNCTION: On mechanical ground diet with thin liquid.  Then reports patient was eating 3 meals a day prior to coming to the hospital.    PATIENT GOALS/EXPECTATIONS: Continue eating orally    HISTORY:  79-year-old male with the above diagnosis referred for speech therapy evaluation to assess for swallowing.  Currently with core track.  Concern for possible aspiration of tube feeding.    CURRENT DIET LEVEL: N.p.o. with core track    OBJECTIVE:    TEST ADMINISTERED: Clinical dysphagia evaluation    COGNITION/SAFETY AWARENESS: Impaired    BEHAVIORAL OBSERVATIONS: Patient requiring cueing to maintain alertness    ORAL MOTOR EXAM: Patient did not follow commands for complete oral motor examination.  Mild general decreased oral motor strength/range of motion 4 -/5.    VOICE QUALITY: Adequate    REFLEX EXAM: Deferred    POSTURE: Sitting upright in chair    FEEDING/SWALLOWING FUNCTION: Assessed with nectar liquid, thin liquids, puréed solids, crunchy solid.    CLINICAL OBSERVATIONS: Nectar liquid by spoon straw with delayed initiation of swallow, swallow delay, vocal quality remaining clear to cervical auscultation.  Thin liquid by straw with delayed initiation of swallow, patient at times holding bolus prior to completing swallow, Vocal quality remaining clear to cervical auscultation.  Purée solid with lingual pumping, swallow completed with laryngeal elevation noted to palpation.  Crunchy solid dipped in applesauce, initially with difficulty to bite, extended chewing followed by swallow completed, oral residue noted.    DYSPHAGIA CRITERIA: Decreased cognition for feeding, decreased oral bolus control, swallow delay, risk of aspiration.    FUNCTIONAL ASSESSMENT INSTRUMENT: Patient currently scored a level 4 of 7 on Functional Communication Measures for swallowing indicating a 40-59% limitation in function.    ASSESSMENT/ PLAN OF CARE:  Pt presents with limitations, noted below, that impede his ability to follow safely and maintain nutrition. The skills of a therapist will be required to safely and effectively implement the following treatment plan to restore maximal level of function.    PROBLEMS:  1.  Decreased  cognition for feeding, decreased oral bolus control, delayed swallow, risk of aspiration                       LTG 1: 30 days: Patient will tolerate least restrictive diet utilizing appropriate positioning and strategies with minimal assistance.                       STG 1a: 14 days: Patient will tolerate diet of puréed solids and nectar thickened liquids with minimal assistance for strategies.                       STG 1b: 14 days: Patient will tolerate 8 of 10 trials of thin liquids with min to no signs or symptoms of aspiration.                       STG 1c: 14 days: Patient with demonstrate adequate chewing and swallowing 8 of 10 trials of soft solids.                       STG 1d: 14 days: Patient/family education.                       TREATMENT: Dysphagia therapy to address swallow function through exercises and education of strategies.     FREQUENCY/DURATION: Twice daily 5 times per week    REHAB POTENTIAL:  Pt has fair to good rehab potential.  The following limitations may influence improvement/ length of tx: Medical status.    RECOMMENDATIONS:   1.   DIET: Puréed diet with nectar thickened liquid.  May likely continue to require alternative feeding until p.o. diet can be fully established.    2.  POSITION: Positioning fully upright for all p.o. intake and 30 minutes following.    3.  COMPENSATORY STRATEGIES: One-on-one assist to feed self only when patient is fully awake, alert, and participating.  Alternate small bites and small sips of solids and liquids at a slow rate.  May utilize controlled straw drink with nectar liquid.  Check swallow each bite/sip.    Pt/responsible party agrees with plan of care and has been informed of all alternatives, risks and benefits.                            Anticipated Discharge Disposition (SLP): home with 24/7 care (12/18/24 1140)                                                               EDUCATION  The patient has been educated in the following areas:   Modified  Diet Instruction.                Time Calculation:    Time Calculation- SLP       Row Name 12/18/24 1140             Time Calculation- SLP    SLP Start Time 1000  -TB      SLP Stop Time 1100  -TB      SLP Time Calculation (min) 60 min  -TB      SLP Received On 12/18/24  -TB         Untimed Charges    SLP Eval/Re-eval  ST Eval Oral Pharyng Swallow - 92143  -TB      23886-TP Eval Oral Pharyng Swallow Minutes 60  -TB         Total Minutes    Untimed Charges Total Minutes 60  -TB       Total Minutes 60  -TB                User Key  (r) = Recorded By, (t) = Taken By, (c) = Cosigned By      Initials Name Provider Type    TB Katie Isaacs SLP Speech and Language Pathologist                    Therapy Charges for Today       Code Description Service Date Service Provider Modifiers Qty    [id number]  ST EVAL ORAL PHARYNG SWALLOW 4 12/18/2024 Katie Isaacs SLP GN 1                 ADILSON Álvarez  12/18/2024

## 2024-12-18 NOTE — PLAN OF CARE
Goal Outcome Evaluation:  Plan of Care Reviewed With: patient, family      ASSESSMENT/ PLAN OF CARE:  Pt presents with limitations, noted below, that impede his ability to follow safely and maintain nutrition. The skills of a therapist will be required to safely and effectively implement the following treatment plan to restore maximal level of function.    PROBLEMS:  1.  Decreased cognition for feeding, decreased oral bolus control, delayed swallow, risk of aspiration                       LTG 1: 30 days: Patient will tolerate least restrictive diet utilizing appropriate positioning and strategies with minimal assistance.                       STG 1a: 14 days: Patient will tolerate diet of puréed solids and nectar thickened liquids with minimal assistance for strategies.                       STG 1b: 14 days: Patient will tolerate 8 of 10 trials of thin liquids with min to no signs or symptoms of aspiration.                       STG 1c: 14 days: Patient with demonstrate adequate chewing and swallowing 8 of 10 trials of soft solids.                       STG 1d: 14 days: Patient/family education.                       TREATMENT: Dysphagia therapy to address swallow function through exercises and education of strategies.     FREQUENCY/DURATION: Twice daily 5 times per week    REHAB POTENTIAL:  Pt has fair to good rehab potential.  The following limitations may influence improvement/ length of tx: Medical status.    RECOMMENDATIONS:   1.   DIET: Puréed diet with nectar thickened liquid.  May likely continue to require alternative feeding until p.o. diet can be fully established.    2.  POSITION: Positioning fully upright for all p.o. intake and 30 minutes following.    3.  COMPENSATORY STRATEGIES: One-on-one assist to feed self only when patient is fully awake, alert, and participating.  Alternate small bites and small sips of solids and liquids at a slow rate.  May utilize controlled straw drink with nectar liquid.   Check swallow each bite/sip.          Anticipated Discharge Disposition (SLP): home with 24/7 care

## 2024-12-18 NOTE — NURSING NOTE
Duration of Treatment 3.5 Hours   Access Site AVF   Dialyzer Revaclear    mL/min   Dialysate Temperature (C) 36   Use Crit-Line? No   BFR-As tolerated to a maximum of: 400 mL/min   Prime Dialyzer With NS to Priming Volume? Yes   Dialysate Solution Bath: K+ = 3 mEq, CA = 2.5mg   Bicarb 35 mEq   Na+ 137 meq   Fluid Removal: 1L     Pt completed 3:02 of tx. Machine began to clot, tx ended early. Removed 700 mL. Post /57. Pt daughter at bedside for most of tx, very helpful. Pt did remove forearm IV during dialysis.     Report given to Linnette MARADIAGA.

## 2024-12-18 NOTE — PLAN OF CARE
Goal Outcome Evaluation:  Plan of Care Reviewed With: patient        Progress: improving  Outcome Evaluation: No c/o pain, up to chair for 1 hour, tube feedings restarted, diet advanced to pureed/nectar thick fluids, remains on 1.5L, HD in afternoon, repeat chest x-ray in am.

## 2024-12-18 NOTE — PROGRESS NOTES
[facility]   Hospitalist Progress Note    Date of admission: 12/5/2024  Patient Name: Nelson Wang  1945  Date: 12/18/2024      Subjective     Chief Complaint   Patient presents with    Shortness of Breath       Interval Followup: still with levels of confusion, about the same as yesterday, more tired currently had worked with therapy earlier and speech per discussion with family at bedside.  Patient denying acute complaints to me not complaining of shortness of air, slightly less interactive today.  Intermittently restless and try to get out of chair slowly but is easily redirectable    Patient apparently some increased respiratory concerns overnight noted and chest x-ray was obtained    Objective     Vitals:   Temp:  [97.2 °F (36.2 °C)-97.7 °F (36.5 °C)] 97.3 °F (36.3 °C)  Heart Rate:  [82-97] 87  Resp:  [16-18] 16  BP: (108-143)/(45-68) 143/54  Flow (L/min) (Oxygen Therapy):  [1.5] 1.5    Physical Exam  Awake tired but conversant but does need some prompting at times, occasionally will seem to get distracted and stare off and let to be redirected, family present, alert to self can answer basic questions okay at times  NG in place  Faint crackles in right greater than left lower lung field similar to yesterday, no acute wheezing on nasal cannula  RRR no lower extremity pitting edema  Abdomen soft nontender  Generalized muscle wasting and weakness        Result Review:  Vital signs, labs and recent relevant imaging reviewed.      CBC          12/15/2024    04:45 12/16/2024    04:52 12/17/2024    05:54   CBC   WBC 8.31  6.51  6.47    RBC 3.89  3.80  3.49    Hemoglobin 10.7  10.4  9.3    Hematocrit 36.6  35.1  31.3    MCV 94.1  92.4  89.7    MCH 27.5  27.4  26.6    MCHC 29.2  29.6  29.7    RDW 22.1  21.8  21.0    Platelets 153  141  149      CMP          12/15/2024    04:45 12/16/2024    04:52 12/17/2024    05:54   CMP   Glucose 178  216  159    BUN 65  88  47    Creatinine 4.90  6.03  3.63    EGFR 11.4   8.9  16.4    Sodium 134  129  129    Potassium 4.2  4.3  4.2    Chloride 94  92  93    Calcium 9.7  9.5  9.4    Total Protein 7.3      Albumin 3.2   2.7    Globulin 4.1      Total Bilirubin 0.9      Alkaline Phosphatase 84      AST (SGOT) 31      ALT (SGPT) 17      Albumin/Globulin Ratio 0.8      BUN/Creatinine Ratio 13.3  14.6  12.9    Anion Gap 19.0  18.3  9.8          artificial tears    senna-docusate sodium **AND** polyethylene glycol **AND** [DISCONTINUED] bisacodyl **AND** bisacodyl    dextrose    dextrose    Diclofenac Sodium    glucagon (human recombinant)    heparin (porcine)    heparin (porcine)    ipratropium-albuterol    Lidocaine    ondansetron    Polyvinyl Alcohol-Povidone PF    QUEtiapine    sodium chloride    sodium chloride    sodium chloride    trolamine salicylate    acetaminophen, 650 mg, Nasogastric, Q6H  arformoterol, 15 mcg, Nebulization, BID - RT  [Held by provider] atorvastatin, 40 mg, Oral, Daily  budesonide, 0.5 mg, Nebulization, BID - RT  famotidine, 20 mg, Nasogastric, Daily  insulin lispro, 2-7 Units, Subcutaneous, Q6H  levothyroxine, 175 mcg, Nasogastric, Q AM  melatonin, 2.5 mg, Nasogastric, Nightly  micafungin (MYCAMINE) IV, 100 mg, Intravenous, Q24H  midodrine, 10 mg, Nasogastric, Once per day on Monday Wednesday Friday  mineral oil-hydrophilic petrolatum, 1 Application, Topical, Daily  QUEtiapine, 12.5 mg, Nasogastric, Nightly  sodium chloride, 10 mL, Intravenous, Q12H        XR Chest 1 View    Result Date: 12/18/2024  Increased bilateral infiltrates are seen. The findings may represent worsening infectious multifocal pneumonia. Increased pulmonary edema cannot be excluded. Aspiration pneumonia cannot be excluded. Please see above comments for further detail.   Portions of this note were completed with a voice recognition program.  Electronically Signed By-Stan Hogan MD On:12/18/2024 6:07 AM      CT Head Without Contrast    Result Date: 12/13/2024  Impression: No acute  intracranial process. Improving right posterior scalp hematoma. Electronically Signed: Margaret Hammond MD  12/13/2024 9:05 AM EST  Workstation ID: [id number]    XR Chest 1 View    Result Date: 12/13/2024  Impression: Removed feeding tube, otherwise no significant radiographic change since 12/11/2024. Electronically Signed: Lorne Bustamante MD  12/13/2024 8:06 AM EST  Workstation ID: [id number]    XR Abdomen KUB    Result Date: 12/11/2024  Impression: 1.Lines and tubes as above. 2.Indeterminate bowel gas pattern. 3.Subcutaneous emphysema overlies the right lower chest. Bilateral pleural effusions. Electronically Signed: Carlos Joiner MD  12/11/2024 7:27 AM EST  Workstation ID: [id number]    XR Chest 1 View    Result Date: 12/11/2024  1.Interval removal of right chest tube without evidence of pneumothorax. 2.Persistent pleural effusions with bibasilar consolidations, right worse than left. 3.Subcutaneous emphysema. Electronically Signed: Cristian Tiwari MD  12/11/2024 5:15 AM EST  Workstation ID: [id number]     Assessment / Plan     Summary: 79 y.o. ESRD on dialysis, type 2 diabetes, dementia, discitis on vancomycin, A-fib, restrictive lung disease who presents to the emergency department for evaluation of hypoxia and shortness of breath. Patient was started on supplemental oxygen to maintain his oxygen saturations. He was given a dose of Bumex overnight. His chest x-ray was consistent with bilateral pleural effusions. Pulmonology was consulted for the bilateral pleural effusions and and nephrology consulted for possible volume overload. Pulmonology was performing a thoracentesis this morning. Thoracentesis was showing bloody fluid. Patient suffered CODE BLUE. Likely bleeding into his airways causing hypoxic arrest. He received CPR for 6 minutes and achieved ROSC. He was transferred to the ICU, intubated and on blood pressure support. Bronchoscopy was showing blood clots in his airways. Patient was started on CRRT.  Patient's medical status is tenuous. He is currently on 1 pressor. CRRT is removing fluid at a slow rate. Patient started on micafungin on 12/7/2024. Patient extubated on 12/8/2024. Patient on high flow nasal cannula 30 L 25%. Slowly weaning down on supplemental oxygen, breathing more comfortably. Poor mental status still waxing and waning. Patient has been transitioned over to hemodialysis, blood pressure tolerating.     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  Discussed with ID previously, has completed IV vancomycin course for staph epi discitis, apparently Dr. Sigala (ID in [place]) saw him and 6 week course would be completed 12/14   Cont iv micafungin for 14d course, from 12/5 positive culture repeat from central and arterial line from 12/7 negative   Continue respiratory hygiene, airway clearance, nebulizers/BPH, appreciate pulmonology assistance  Continue dialysis as per nephrology  appreciate assistance, has been on RONALD outpatient, no bleeding noted monitor hemoglobin   Weight increased on trend, suspect volume related - monitor trend  Cont midodrine on HD days and monitor bp closely, has limited HD previously, hopefully can complete entire course today  Seroquel dose prn prior to HD to hopefully help him tolerate given prior agitation with this.    Cont seroquel at night 12.5mg  Home sertraline 100 has not been resumed, given sedation/mentation issues still favor avoiding resumption at this time and monitor tolerance of seroquel.  EEG from prior hospitalization 11/15 and 12/10 without seizure activity noted on limited study.  Keppra has been dc'd given concern of contribution to mentation.  Will continue to monitor without this.    12/1 ct head -had a small hematoma, no acute abnormality, has generalized parenchymal volume loss noted, suspicious for underlying component of dementia along with uremia/hospital delirium  Continue tube feeds, aspiration precautions,   Was cleared for partial diet today provided mentation is adequate, will need to assess with each meal whether safe to administer, continue NG for now tube feeds as otherwise would not have an adequate nutrition and also with allowing us to give medications.  Will continue to reassess  Appreciate speech therapy assistance   Labs pending were unable to get this morning, going to obtain with hemodialysis will follow-up discussed with phlebotomy.  May need to titrate free water further had adjusted yesterday given hyponatremia suspect in setting of volume overload  Continue with scheduled Tylenol course to try and minimize any sedating pain medications.  Lidocaine patch as needed, Voltaren.  Continue Synthroid  Continue SSI and monitor blood glucose  Continue famotidine  Continue PT/OT  Continue supportive care discussions with patient and family  Check a.m. CBC, BMP, magnesium, phosphorus  Continue hospitalization at current level of  care    Dispo: Ultimately goal for going home with family support once medically stable.   D/w SW    VTE Prophylaxis:  Pharmacologic & mechanical VTE prophylaxis orders are present.      Level Of Support Discussed With: Next of Kin (If No Surrogate)  Code Status (Patient has no pulse and is not breathing): No CPR (Do Not Attempt to Resuscitate)  Medical Interventions (Patient has pulse or is breathing): Full Support    Greater than 50 minutes on this encounter including review of labs, imaging, documentation, orders, vitals, monitoring and adjusting treatment and orders as indicated, discussion with the patient, family, pulm, and documenting.

## 2024-12-18 NOTE — PLAN OF CARE
Goal Outcome Evaluation:  Plan of Care Reviewed With: patient, family        Progress: no change  Outcome Evaluation: a/ox1-2, orientation and mentation waxes/wanes. remains on 1.5 L NC, SR 80s on monitor. cortrak in place, meds given through cortrak, strict NPO with oral care. HOB >30. patient tried to get out of bed at times overnight. patient slides himself down in bed frequently. family requests all 4 bedrails up at all times. patient started having cough and wet quality to voice. hospitalist PA notified overnight, tubefeeds currently stopped and awaiting CXR. Bed alarm in place, frequent rounding, family currently at bedside. patient reoriented and redirected as appropriate. call light within reach. Patient to go to HD this am.

## 2024-12-18 NOTE — PROGRESS NOTES
[facility]     Progress Note    Patient Name: Nelson Wang  : 1945  MRN: [id number]  Primary Care Physician:  Domingo Bravo MD  Date of admission: 2024    Subjective   Subjective     Chief Complaint:   Follow-up for post cardiac arrest with hemoptysis, acute hypoxic respiratory failure, acute on chronic congestive diastolic heart failure, acute cardiogenic pulmonary edema, loculated right-sided pleural effusion, ESRD on hemodialysis, GASTON with home CPAP.    Chest x-ray from this morning shows possible worsening infiltrate    Objective   Objective     Vitals:   Temp:  [97.2 °F (36.2 °C)-97.7 °F (36.5 °C)] 97.3 °F (36.3 °C)  Heart Rate:  [81-97] 81  Resp:  [16-18] 16  BP: (108-143)/(45-68) 143/54  Flow (L/min) (Oxygen Therapy):  [1.5] 1.5    Physical Exam   Frail  Resting comfortably bed  Does have some faint crackles    Result Review    Result Review:  I have personally reviewed the results from the time of this admission to 2024 15:00 EST and agree with these findings:  [x]  Laboratory  [x]  Microbiology  [x]  Radiology  [x]  EKG/Telemetry   [x]  Cardiology/Vascular   []  Pathology  []  Old records  []  Other:  Most notable findings include:       Lab 24  0554 24  0452 12/15/24  0445 24  0428 24  1416 24  0319 24  0417   WBC 6.47 6.51 8.31 9.35  --  8.98 7.97   HEMOGLOBIN 9.3* 10.4* 10.7* 10.6*  --  11.5* 10.6*   HEMATOCRIT 31.3* 35.1* 36.6* 34.8*  --  39.2 35.8*   PLATELETS 149 141 153 165  --  182 146   SODIUM 129* 129* 134* 136  --  140 139   POTASSIUM 4.2 4.3 4.2 3.7  --  4.1 4.1   CHLORIDE 93* 92* 94* 98  --  102 104   CO2 26.2 18.7* 21.0* 25.1  --  24.9 20.8*   BUN 47* 88* 65* 43*  --  66* 38*   CREATININE 3.63* 6.03* 4.90* 3.00*  --  5.19* 3.32*   GLUCOSE 159* 216* 178* 242*  --  266* 172*   CALCIUM 9.4 9.5 9.7 9.4  --  10.4 10.2   PHOSPHORUS 4.3 5.8* 4.8* 4.3  4.3 1.6* 2.2* 2.4*   TOTAL PROTEIN  --   --  7.3  --   --  8.0 7.9   ALBUMIN 2.7*   --  3.2* 3.4*  --  3.9 3.9   GLOBULIN  --   --  4.1  --   --  4.1 4.0       Results for orders placed during the hospital encounter of 12/05/24    Adult Transthoracic Echo Complete W/ Cont if Necessary Per Protocol    Interpretation Summary    The quality of the study is limited due to limited views obtained    Left ventricular systolic function is normal. Calculated left ventricular EF = 57.8%    The right ventricular cavity is mildly dilated.    The left atrial cavity is mildly dilated.    There is mild calcification of the aortic valve no evidence of vegetation seen    Blood cultures with Candida and Staph epidermidis  Repeat blood cultures negative    EEG with no seizures.  Did have some slowing consistent with encephalopathy      Assessment & Plan   Assessment / Plan       Active Hospital Problems:  Active Hospital Problems    Diagnosis     **Hypoxia    Bilateral effusion    Plan:   Chest x-ray reviewed  Patient does indeed have worsening infiltrate  ?  Question of some of this could be from volume as patient is due for dialysis today and was able to have ultrafiltration yesterday  We will hold off on antibiotics at this time unless his status decompensates  Repeat chest x-ray tomorrow after dialysis  Continue current bronchodilator therapies      VTE Prophylaxis:  Pharmacologic & mechanical VTE prophylaxis orders are present.    CODE STATUS:   Level Of Support Discussed With: Next of Kin (If No Surrogate)  Code Status (Patient has no pulse and is not breathing): No CPR (Do Not Attempt to Resuscitate)  Medical Interventions (Patient has pulse or is breathing): Full Support      Labs, imaging, microbiology, notes and medications personally reviewed  Discussed with primary    Electronically signed by Galileo Greenwood DO, 12/18/2024, 15:00 EST.

## 2024-12-18 NOTE — THERAPY TREATMENT NOTE
Patient Name: Nelson Wang  : 1945    MRN: [id number]                              Today's Date: 2024       Admit Date: 2024    Visit Dx:     ICD-10-CM ICD-9-CM   1. Acute respiratory failure with hypoxia  J96.01 518.81   2. Acute pulmonary edema  J81.0 518.4   3. Difficulty walking  R26.2 719.7   4. Oropharyngeal dysphagia  R13.12 787.22     Patient Active Problem List   Diagnosis    Essential hypertension    Bradycardia, sinus    Anemia due to chronic kidney disease    Hypothyroidism    Idiopathic gout    Proteinuria    Psoriasis    Thrombocytopenia    Type 2 diabetes mellitus without complication    CKD (chronic kidney disease), stage IV    Hyperlipidemia    Monoclonal gammopathy of unknown significance (MGUS)    Stage 5 chronic kidney disease    Chronic gout without tophus    Peritoneal dialysis catheter dysfunction    Medicare annual wellness visit, subsequent    Chronic cough    Peritonitis associated with peritoneal dialysis    Recurrent pneumonia    Acute on chronic respiratory failure with hypoxia    End stage renal disease    Aspiration pneumonitis    Sepsis    Type 2 diabetes mellitus, with long-term current use of insulin    GERD without esophagitis    Immunosuppression due to drug therapy    Bipolar disorder    Chronic respiratory failure with hypoxia    Shortness of breath    Abnormal stress test    ESRD on dialysis    Abnormal chest CT    Encounter for screening for malignant neoplasm of colon    History of colon polyps    Family history of colon cancer    Intractable pain    Abdominal pain    ESRD (end stage renal disease) on dialysis    AMS (altered mental status)    Hallucinations    LUQ pain    Discitis    Metabolic encephalopathy    Aspiration pneumonia    Discitis of lumbar region    Severe malnutrition    Dementia    Unspecified severe protein-calorie malnutrition    Hypoxia     Past Medical History:   Diagnosis Date    Abnormal stress test     Anemia     IRON INFUSIONS  WITH DIALYSIS    Anesthesia     WITH HIP REPLACEMENT DAUGHTER BELIEVES HAD SVT 2000    Ankle pain, right     Anxiety and depression     Arthritis     CKD (chronic kidney disease), stage IV     DIALYSIS [id number] SHILEY IN RIGHT CHEST    Diabetes     GERD (gastroesophageal reflux disease)     Gout     High cholesterol     History of peritoneal dialysis     HL (hearing loss)     Hyperkalemia     Hypertension     Hypothyroidism     Insomnia     Night terrors     Psoriasis     Psoriasis     Sleep apnea     Sleep walking     Thrombocytopenia     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    Vitiligo      Past Surgical History:   Procedure Laterality Date    ABDOMINAL SURGERY      ANKLE SURGERY Right 11/1990    APPENDECTOMY N/A 1954    ARTERIOVENOUS FISTULA/SHUNT SURGERY Left 07/16/2024    Procedure: Creation of left arm arteriovenous fistula;  Surgeon: Moses Mir MD;  Location: [facility] MAIN OR;  Service: Vascular;  Laterality: Left;    BRONCHOSCOPY N/A 03/01/2024    Procedure: BRONCHOSCOPY WITH BAL AND WASHINGS;  Surgeon: Sandra Espinal MD;  Location: [facility] ENDOSCOPY;  Service: Pulmonary;  Laterality: N/A;  PNEUMONIA    BRONCHOSCOPY N/A 08/30/2024    Procedure: BRONCHOSCOPY WITH BALS AND WASHINGS;  Surgeon: Sandra Espinal MD;  Location: [facility] ENDOSCOPY;  Service: Pulmonary;  Laterality: N/A;  MUCOUS PLUGGING    CARDIAC CATHETERIZATION N/A 05/29/2024    Procedure: Left Heart Cath with possible coronary angioplasty;  Surgeon: Stephan Nichols MD;  Location: [facility] CATH INVASIVE LOCATION;  Service: Cardiology;  Laterality: N/A;    COLONOSCOPY N/A 06/2022    [facility]    ENDOSCOPY N/A 10/28/2024    Procedure: ESOPHAGOGASTRODUODENOSCOPY INSERTION OF LIGHTED INSTRUMENT TO VIEW ESOPHAGUS, STOMACH AND SMALL INTESTINE;  Surgeon: Kingsley Peraza MD;  Location: [facility] ENDOSCOPY;  Service: Gastroenterology;  Laterality: N/A;  Gastritis    FRACTURE SURGERY      HIP BIPOLAR REPLACEMENT Right 01/2000     INSERTION HEMODIALYSIS CATHETER Left 04/09/2024    Procedure: HEMODIALYSIS CATHETER INSERTION;  Surgeon: Jose Berry MD;  Location:  [facility] MAIN OR;  Service: General;  Laterality: Left;    INSERTION HEMODIALYSIS CATHETER N/A 04/12/2024    Procedure: Tunneled hemodialysis catheter insertion;  Surgeon: Enrique Vinson MD;  [facility];  Service: Vascular;  Laterality: N/A;    INSERTION PERITONEAL DIALYSIS CATHETER N/A 03/27/2023    Procedure: LAPAROSCOPIC INSERTION PERITONEAL DIALYSIS CATHETER, LAPAROSCOPIC OMENTOPEXY WITH LYSIS OF ADHESIONS;  Surgeon: Jose Berry MD;  Location:  [facility] MAIN OR;  Service: General;  Laterality: N/A;    INSERTION PERITONEAL DIALYSIS CATHETER Left 07/23/2023    Procedure: REVISION OF PERITONEAL DIALYSIS CATHETER;  Surgeon: Radha Oreilly MD;  [facility];  Service: General;  Laterality: Left;    JOINT REPLACEMENT      REMOVAL PERITONEAL DIALYSIS CATHETER N/A 04/09/2024    Procedure: REMOVAL PERITONEAL DIALYSIS CATHETER;  Surgeon: Jose Berry MD;  [facility];  Service: General;  Laterality: N/A;    RENAL BIOPSY Left 07/15/2022    UPPER GASTROINTESTINAL ENDOSCOPY      VASCULAR SURGERY      WRIST SURGERY      UNSURE WHICH SIDE DAUGHTER REPORTS HAD SEVERE  CUT FROM WINDOW AND THEY HAD TO DO RECONSTRUCTIVE SURGERY WITH VESSELS AND NERVES      General Information       Row Name 12/18/24 1847          OT Time and Intention    Document Type therapy note (daily note)  -LF     Mode of Treatment individual therapy;occupational therapy  -       Row Name 12/18/24 0802          Cognition    Orientation Status (Cognition) oriented to;person  -               User Key  (r) = Recorded By, (t) = Taken By, (c) = Cosigned By      Initials Name Provider Type    LF Claudette Courtney OT Occupational Therapist                     Mobility/ADL's       Row Name 12/18/24 7639          Bed Mobility    Comment, (Bed Mobility)  Seated on EOB with PTA upon arrival.  -LF       Row Name 12/18/24 1336          Transfers    Transfers sit-stand transfer;stand-sit transfer  -LF       Row Name 12/18/24 1336          Sit-Stand Transfer    Sit-Stand [place] (Transfers) minimum assist (75% patient effort);2 person assist  -LF     Assistive Device (Sit-Stand Transfers) walker, front-wheeled  -LF     Comment, (Sit-Stand Transfer) Max cues for initiation and sequencing.  -LF       Row Name 12/18/24 1336          Stand-Sit Transfer    Stand-Sit [place] (Transfers) minimum assist (75% patient effort);2 person assist  -LF     Assistive Device (Stand-Sit Transfers) walker, front-wheeled  -LF       Row Name 12/18/24 1336          Functional Mobility    Functional Mobility- Ind. Level minimum assist (75% patient effort);2 person assist required  -LF     Functional Mobility- Device walker, front-wheeled  -LF     Functional Mobility- Comment Pt continues to require max directional cues and assist advancing RW.  -LF               User Key  (r) = Recorded By, (t) = Taken By, (c) = Cosigned By      Initials Name Provider Type    LF Claudette Courtney OT Occupational Therapist                   Obj/Interventions       Row Name 12/18/24 1337          Motor Skills    Motor Skills functional endurance  -LF     Functional Endurance Poor+  -       Row Name 12/18/24 1337          Balance    Balance Assessment sitting static balance;standing dynamic balance  -LF     Static Sitting Balance standby assist  -LF     Position, Sitting Balance unsupported;sitting edge of bed  -LF     Dynamic Standing Balance minimal assist;2-person assist  -LF     Position/Device Used, Standing Balance supported;walker, front-wheeled  -LF     Balance Interventions sitting;standing;sit to stand;supported;dynamic;occupation based/functional task;weight shifting activity  -LF               User Key  (r) = Recorded By, (t) = Taken By, (c) = Cosigned By      Initials Name Provider Type      Claudette Courtney OT Occupational Therapist                   Goals/Plan    No documentation.                  Clinical Impression       Row Name 12/18/24 1337          Pain Assessment    Additional Documentation Pain Scale: FACES Pre/Post-Treatment (Group)  -       Row Name 12/18/24 1337          Pain Scale: FACES Pre/Post-Treatment    Pain: FACES Scale, Pretreatment 0-->no hurt  -LF     Posttreatment Pain Rating 0-->no hurt  -LF       Row Name 12/18/24 1337          Plan of Care Review    Plan of Care Reviewed With patient;son  -     Progress improving  -     Outcome Evaluation No significant change this session. Pt would benefit from continued OT services until safe d/c.  -       Row Name 12/18/24 1337          Vital Signs    O2 Delivery Pre Treatment nasal cannula  -     O2 Delivery Intra Treatment nasal cannula  -     O2 Delivery Post Treatment nasal cannula  -       Row Name 12/18/24 1339          Positioning and Restraints    Pre-Treatment Position in bed  -     Post Treatment Position chair  -     In Chair sitting;with family/caregiver;with PT;with nsg  -               User Key  (r) = Recorded By, (t) = Taken By, (c) = Cosigned By      Initials Name Provider Type     Claudette Courtney OT Occupational Therapist                   Outcome Measures       Row Name 12/18/24 7859          How much help from another is currently needed...    Putting on and taking off regular lower body clothing? 1  -LF     Bathing (including washing, rinsing, and drying) 1  -LF     Toileting (which includes using toilet bed pan or urinal) 1  -LF     Putting on and taking off regular upper body clothing 1  -LF     Taking care of personal grooming (such as brushing teeth) 1  -LF     Eating meals 1  -LF     AM-PAC 6 Clicks Score (OT) 6  -LF       Row Name 12/18/24 1101          How much help from another person do you currently need...    Turning from your back to your side while in flat bed without using  bedrails? 2  -SM     Moving from lying on back to sitting on the side of a flat bed without bedrails? 2  -SM     Moving to and from a bed to a chair (including a wheelchair)? 2  -SM     Standing up from a chair using your arms (e.g., wheelchair, bedside chair)? 3  -SM     Climbing 3-5 steps with a railing? 2  -SM     To walk in hospital room? 2  -SM     AM-PAC 6 Clicks Score (PT) 13  -SM     Highest Level of Mobility Goal 4 --> Transfer to chair/commode  -SM       Row Name 12/18/24 1338          Functional Assessment    Outcome Measure Options AM-PAC 6 Clicks Daily Activity (OT);Optimal Instrument  -LF       Row Name 12/18/24 1338          Optimal Instrument    Optimal Instrument Optimal - 3  -LF     Bending/Stooping 4  -LF     Standing 2  -LF     Reaching 2  -LF     From the list, choose the 3 activities you would most like to be able to do without any difficulty Bending/stooping;Standing;Reaching  -LF     Total Score Optimal - 3 8  -LF               User Key  (r) = Recorded By, (t) = Taken By, (c) = Cosigned By      Initials Name Provider Type    LF Claudette Courtney, JOCELYN Occupational Therapist    SM Dana Garcia PTA Physical Therapist Assistant                    Occupational Therapy Education       Title: PT OT SLP Therapies (In Progress)       Topic: Occupational Therapy (In Progress)       Point: ADL training (In Progress)       Description:   Instruct learner(s) on proper safety adaptation and remediation techniques during self care or transfers.   Instruct in proper use of assistive devices.                  Learning Progress Summary            Patient Acceptance, E, NL by PG at 12/13/2024 0945                      Point: Home exercise program (In Progress)       Description:   Instruct learner(s) on appropriate technique for monitoring, assisting and/or progressing therapeutic exercises/activities.                  Learning Progress Summary            Patient Acceptance, E, NL by PG at 12/13/2024 0911                       Point: Precautions (In Progress)       Description:   Instruct learner(s) on prescribed precautions during self-care and functional transfers.                  Learning Progress Summary            Patient Acceptance, E, NL by PG at 12/13/2024 0945                      Point: Body mechanics (In Progress)       Description:   Instruct learner(s) on proper positioning and spine alignment during self-care, functional mobility activities and/or exercises.                  Learning Progress Summary            Patient Acceptance, E, NL by PG at 12/13/2024 0945                                      User Key       Initials Effective Dates Name Provider Type Discipline    PG 06/16/21 -  Chauncey Goldberg OT Occupational Therapist OT                  OT Recommendation and Plan     Plan of Care Review  Plan of Care Reviewed With: patient, son  Progress: improving  Outcome Evaluation: No significant change this session. Pt would benefit from continued OT services until safe d/c.     Time Calculation:         Time Calculation- OT       Row Name 12/18/24 1338 12/18/24 1059          Time Calculation- OT    OT Received On 12/18/24  -LF --     OT Goal Re-Cert Due Date 12/22/24  -LF --        Timed Charges    02524 - Gait Training Minutes  -- 8  -SM     45319 - OT Therapeutic Activity Minutes 10  -LF --        Total Minutes    Timed Charges Total Minutes 10  -LF 8  -SM      Total Minutes 10  -LF 8  -SM               User Key  (r) = Recorded By, (t) = Taken By, (c) = Cosigned By      Initials Name Provider Type    LF Claudette Courtney OT Occupational Therapist     Dana Garcia PTA Physical Therapist Assistant                  Therapy Charges for Today       Code Description Service Date Service Provider Modifiers Qty    32654997168 HC OT THERAPEUTIC ACT EA 15 MIN 12/17/2024 Claudette Courtney OT GO 1    25458509464 HC OT THERAPEUTIC ACT EA 15 MIN 12/18/2024 Claudette Courtney OT GO 1                 Claudette Courtney  OT  12/18/2024

## 2024-12-18 NOTE — PROGRESS NOTES
[facility]     Nephrology Progress Note      Patient Name: Nelson Wang  : 1945  MRN: [id number]  Primary Care Physician:  Domingo Bravo MD  Date of admission: 2024    Subjective   Subjective     Interval History:  No new events.  Seen in dialysis.  Status post Seroquel, very somnolent, no distress.  Reportedly, sat up and had breakfast earlier today..      Objective   Objective     Vitals:   Temp:  [97.2 °F (36.2 °C)-97.7 °F (36.5 °C)] 97.3 °F (36.3 °C)  Heart Rate:  [81-97] 83  Resp:  [16-18] 16  BP: (108-143)/(45-68) 134/54  Flow (L/min) (Oxygen Therapy):  [1.5] 1.5  Physical Exam:   Constitutional: Altered and lethargic.  In dialysis.   Eyes: sclerae anicteric, no conjunctival injection   HENT: mucous membranes moist.  NG in place.   Neck: Supple, no thyromegaly, no lymphadenopathy, trachea midline, No JVD   Respiratory: Decreased to auscultation bilaterally, nonlabored respirations, some upper airway rhonchi.   Cardiovascular: RRR, no murmurs, rubs, or gallops.   Gastrointestinal: Positive bowel sounds, soft, nontender, nondistended   Musculoskeletal: No edema, no clubbing or cyanosis.  Left upper extremity AV fistula, patent.   Neurologic: Arousable, moving extremities, did not follow commands to me..  Inconsistent.  Restless.   Skin: warm and dry, no rashes, areas of hypopigmentation.    Result Review    Result Reviewed:  I have personally reviewed the results from the time of this admission to 2024 15:07 EST and agree with these findings:  [x]  Laboratory  []  Microbiology  [x]  Radiology  []  EKG/Telemetry   []  Cardiology/Vascular   []  Pathology  [x]  Old records  []  Other:        Lab 24  0554 24  0452 12/15/24  0445 24  0428 24  1416 24  0319 24  0417   SODIUM 129* 129* 134* 136  --  140 139   POTASSIUM 4.2 4.3 4.2 3.7  --  4.1 4.1   CHLORIDE 93* 92* 94* 98  --  102 104   CO2 26.2 18.7* 21.0* 25.1  --  24.9 20.8*   BUN 47* 88* 65* 43*  --   66* 38*   CREATININE 3.63* 6.03* 4.90* 3.00*  --  5.19* 3.32*   GLUCOSE 159* 216* 178* 242*  --  266* 172*   EGFR 16.4* 8.9* 11.4* 20.6*  --  10.7* 18.3*   ANION GAP 9.8 18.3* 19.0* 12.9  --  13.1 14.2   MAGNESIUM 2.2 2.3 2.2 2.0  --  3.1* 2.8*   PHOSPHORUS 4.3 5.8* 4.8* 4.3  4.3 1.6* 2.2* 2.4*             Assessment & Plan   Assessment / Plan       Active Hospital Problems:  Active Hospital Problems    Diagnosis     **Hypoxia        Assessment and Plan:    - ESRD, was on home dialysis. Off CRRT.  Off pressors, hemodynamically stable.  Left upper extremity AV fistula for access.  Electrolytes are stable.  Dialysis today.  UF 1 kg as tolerated.  Check labs in AM.     - Volume overload, much better now.   Sodium on low side, reduced flushes to 30ml q4hrs.      - Aspiration pneumonia, and possibly recurrent aspiration, per pulmonary.      - Type 2 diabetes with complications.     - Hypotension blood pressure is acceptable now, on ProAmatine and blood pressure stable.     - Anemia of chronic kidney disease, hemoglobin 10.7 with goal greater than 10, on RONALD as outpatient.  Getting Epogen while here.     - Altered mentation.  Multifactorial.      Discussed with family and went dialysis staff.  Will follow.

## 2024-12-18 NOTE — THERAPY TREATMENT NOTE
Acute Care - Physical Therapy Treatment Note  BRIA Sullivan     Patient Name: Nelson Wang  : 1945  MRN: [id number]  Today's Date: 2024      Visit Dx:     ICD-10-CM ICD-9-CM   1. Acute respiratory failure with hypoxia  J96.01 518.81   2. Acute pulmonary edema  J81.0 518.4   3. Difficulty walking  R26.2 719.7     Patient Active Problem List   Diagnosis    Essential hypertension    Bradycardia, sinus    Anemia due to chronic kidney disease    Hypothyroidism    Idiopathic gout    Proteinuria    Psoriasis    Thrombocytopenia    Type 2 diabetes mellitus without complication    CKD (chronic kidney disease), stage IV    Hyperlipidemia    Monoclonal gammopathy of unknown significance (MGUS)    Stage 5 chronic kidney disease    Chronic gout without tophus    Peritoneal dialysis catheter dysfunction    Medicare annual wellness visit, subsequent    Chronic cough    Peritonitis associated with peritoneal dialysis    Recurrent pneumonia    Acute on chronic respiratory failure with hypoxia    End stage renal disease    Aspiration pneumonitis    Sepsis    Type 2 diabetes mellitus, with long-term current use of insulin    GERD without esophagitis    Immunosuppression due to drug therapy    Bipolar disorder    Chronic respiratory failure with hypoxia    Shortness of breath    Abnormal stress test    ESRD on dialysis    Abnormal chest CT    Encounter for screening for malignant neoplasm of colon    History of colon polyps    Family history of colon cancer    Intractable pain    Abdominal pain    ESRD (end stage renal disease) on dialysis    AMS (altered mental status)    Hallucinations    LUQ pain    Discitis    Metabolic encephalopathy    Aspiration pneumonia    Discitis of lumbar region    Severe malnutrition    Dementia    Unspecified severe protein-calorie malnutrition    Hypoxia     Past Medical History:   Diagnosis Date    Abnormal stress test     Anemia     IRON INFUSIONS WITH DIALYSIS    Anesthesia     WITH HIP  REPLACEMENT DAUGHTER BELIEVES HAD SVT 2000    Ankle pain, right     Anxiety and depression     Arthritis     CKD (chronic kidney disease), stage IV     DIALYSIS [id number] SHILEY IN RIGHT CHEST    Diabetes     GERD (gastroesophageal reflux disease)     Gout     High cholesterol     History of peritoneal dialysis     HL (hearing loss)     Hyperkalemia     Hypertension     Hypothyroidism     Insomnia     Night terrors     Psoriasis     Psoriasis     Sleep apnea     Sleep walking     Thrombocytopenia     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    Vitiligo      Past Surgical History:   Procedure Laterality Date    ABDOMINAL SURGERY      ANKLE SURGERY Right 11/1990    APPENDECTOMY N/A 1954    ARTERIOVENOUS FISTULA/SHUNT SURGERY Left 07/16/2024    Procedure: Creation of left arm arteriovenous fistula;  Surgeon: Moses Mir MD;  Location: [facility] MAIN OR;  Service: Vascular;  Laterality: Left;    BRONCHOSCOPY N/A 03/01/2024    Procedure: BRONCHOSCOPY WITH BAL AND WASHINGS;  Surgeon: Sandra Espinal MD;  Location: [facility] ENDOSCOPY;  Service: Pulmonary;  Laterality: N/A;  PNEUMONIA    BRONCHOSCOPY N/A 08/30/2024    Procedure: BRONCHOSCOPY WITH BALS AND WASHINGS;  Surgeon: Sandra Espinal MD;  Location: [facility] ENDOSCOPY;  Service: Pulmonary;  Laterality: N/A;  MUCOUS PLUGGING    CARDIAC CATHETERIZATION N/A 05/29/2024    Procedure: Left Heart Cath with possible coronary angioplasty;  Surgeon: Stephan Nichols MD;  Location: [facility] CATH INVASIVE LOCATION;  Service: Cardiology;  Laterality: N/A;    COLONOSCOPY N/A 06/2022    [facility]    ENDOSCOPY N/A 10/28/2024    Procedure: ESOPHAGOGASTRODUODENOSCOPY INSERTION OF LIGHTED INSTRUMENT TO VIEW ESOPHAGUS, STOMACH AND SMALL INTESTINE;  Surgeon: Kingsley Peraza MD;  Location: [facility] ENDOSCOPY;  Service: Gastroenterology;  Laterality: N/A;  Gastritis    FRACTURE SURGERY      HIP BIPOLAR REPLACEMENT Right 01/2000    INSERTION HEMODIALYSIS CATHETER Left 04/09/2024     Procedure: HEMODIALYSIS CATHETER INSERTION;  Surgeon: Jose Berry MD;  Location: [facility] MAIN OR;  Service: General;  Laterality: Left;    INSERTION HEMODIALYSIS CATHETER N/A 04/12/2024    Procedure: Tunneled hemodialysis catheter insertion;  Surgeon: Enrique Vinson MD;  Location: [facility] MAIN OR;  Service: Vascular;  Laterality: N/A;    INSERTION PERITONEAL DIALYSIS CATHETER N/A 03/27/2023    Procedure: LAPAROSCOPIC INSERTION PERITONEAL DIALYSIS CATHETER, LAPAROSCOPIC OMENTOPEXY WITH LYSIS OF ADHESIONS;  Surgeon: Jose Berry MD;  Location: [facility] MAIN OR;  Service: General;  Laterality: N/A;    INSERTION PERITONEAL DIALYSIS CATHETER Left 07/23/2023    Procedure: REVISION OF PERITONEAL DIALYSIS CATHETER;  Surgeon: Radha Oreilly MD;  Location: [facility] MAIN OR;  Service: General;  Laterality: Left;    JOINT REPLACEMENT      REMOVAL PERITONEAL DIALYSIS CATHETER N/A 04/09/2024    Procedure: REMOVAL PERITONEAL DIALYSIS CATHETER;  Surgeon: Jose Berry MD;  Location: [facility] MAIN OR;  Service: General;  Laterality: N/A;    RENAL BIOPSY Left 07/15/2022    UPPER GASTROINTESTINAL ENDOSCOPY      VASCULAR SURGERY      WRIST SURGERY      UNSURE WHICH SIDE DAUGHTER REPORTS HAD SEVERE  CUT FROM WINDOW AND THEY HAD TO DO RECONSTRUCTIVE SURGERY WITH VESSELS AND NERVES     PT Assessment (Last 12 Hours)       PT Evaluation and Treatment       Row Name 12/18/24 1050          Physical Therapy Time and Intention    Subjective Information no complaints  -SM     Document Type therapy note (daily note)  -SM     Mode of Treatment individual therapy;physical therapy  -SM     Patient Effort good  -SM     Symptoms Noted During/After Treatment fatigue  -SM       Row Name 12/18/24 1054          Cognition    Affect/Mental Status (Cognition) low arousal/lethargic  -SM     Follows Commands (Cognition) repetition of directions required;verbal cues/prompting required;physical/tactile prompts  required;visual cue;increased processing time needed  -     Cognitive Function (Cognition) attention deficit  -       Row Name 12/18/24 1059          Bed Mobility    Supine-Sit [place] (Bed Mobility) moderate assist (50% patient effort);2 person assist  -     Bed Mobility, Safety Issues cognitive deficits limit understanding;decreased use of arms for pushing/pulling;decreased use of legs for bridging/pushing  -     Assistive Device (Bed Mobility) head of bed elevated;repositioning sheet  -       Row Name 12/18/24 1059          Transfers    Transfers bed-chair transfer  -       Row Name 12/18/24 1059          Bed-Chair Transfer    Bed-Chair [place] (Transfers) minimum assist (75% patient effort);2 person assist  -     Assistive Device (Bed-Chair Transfers) walker, front-wheeled  -       Row Name 12/18/24 1059          Sit-Stand Transfer    Sit-Stand [place] (Transfers) minimum assist (75% patient effort)  -     Assistive Device (Sit-Stand Transfers) walker, front-wheeled  -       Row Name 12/18/24 1059          Stand-Sit Transfer    Stand-Sit [place] (Transfers) minimum assist (75% patient effort)  -     Assistive Device (Stand-Sit Transfers) walker, front-wheeled  -       Row Name 12/18/24 1059          Gait/Stairs (Locomotion)    Gait/Stairs Locomotion gait/ambulation assistive device  -     [place] Level (Gait) minimum assist (75% patient effort);2 person assist  -     Assistive Device (Gait) walker, front-wheeled  -     Patient was able to Ambulate yes  -     Distance in Feet (Gait) 5  bed to chair  -     Deviations/Abnormal Patterns (Gait) gait speed decreased;stride length decreased  -     Bilateral Gait Deviations forward flexed posture;heel strike decreased;knee buckling bilaterally  -     Left Sided Gait Deviations leans left  -       Row Name 12/18/24 1059          Safety Issues/Impairments Affecting Functional Mobility    Impairments  Affecting Function (Mobility) balance;cognition;endurance/activity tolerance;strength  -     Cognitive Impairments, Mobility Safety/Performance attention  -       Row Name 12/18/24 1059          Balance    Dynamic Standing Balance minimal assist;2-person assist  -     Position/Device Used, Standing Balance walker, front-wheeled  -       Row Name 12/18/24 1059          Motor Skills    Therapeutic Exercise hip;knee;ankle  15x ankle pumps, LAQ's, seated marches  -       Row Name             Wound 11/15/24 1945 Right upper chest    Wound - Properties Group Placement Date: 11/15/24  -JR Placement Time: 1945 -JR Side: Right  -JR Orientation: upper  -JR Location: chest  -JR Primary Wound Type: Incision  -JR    Retired Wound - Properties Group Placement Date: 11/15/24  -JR Placement Time: 1945 -JR Side: Right  -JR Orientation: upper  -JR Location: chest  -JR Primary Wound Type: Incision  -JR    Retired Wound - Properties Group Placement Date: 11/15/24  -JR Placement Time: 1945 -JR Side: Right  -JR Orientation: upper  -JR Location: chest  -JR Primary Wound Type: Incision  -JR    Retired Wound - Properties Group Date first assessed: 11/15/24  -JR Time first assessed: 1945 -JR Side: Right  -JR Location: chest  -JR Primary Wound Type: Incision  -JR      Row Name             Wound 12/05/24 2331 Right lower leg abrasion    Wound - Properties Group Placement Date: 12/05/24  -AW Placement Time: 2331 -AW Side: Right  -AW Orientation: lower  -AW Location: leg  -AW Primary Wound Type: Abrasion  -AW Type: abrasion  -AW Present on Original Admission: Y  -AW    Retired Wound - Properties Group Placement Date: 12/05/24  -AW Placement Time: 2331 -AW Present on Original Admission: Y  -AW Side: Right  -AW Orientation: lower  -AW Location: leg  -AW Primary Wound Type: Abrasion  -AW Type: abrasion  -AW    Retired Wound - Properties Group Placement Date: 12/05/24  -AW Placement Time: 2331 -AW Present on Original Admission: Y   -AW Side: Right  -AW Orientation: lower  -AW Location: leg  -AW Primary Wound Type: Abrasion  -AW Type: abrasion  -AW    Retired Wound - Properties Group Date first assessed: 12/05/24  -AW Time first assessed: 2331  -AW Present on Original Admission: Y  -AW Side: Right  -AW Location: leg  -AW Primary Wound Type: Abrasion  -AW Type: abrasion  -AW      Row Name             Wound 12/06/24 1440 Left lower leg skin tear    Wound - Properties Group Placement Date: 12/06/24  -KE Placement Time: 1440  -KE Side: Left  -KE Orientation: lower  -KE Location: leg  -KE Primary Wound Type: Skin tear  -KE Type: skin tear  -KE    Retired Wound - Properties Group Placement Date: 12/06/24  -KE Placement Time: 1440  -KE Side: Left  -KE Orientation: lower  -KE Location: leg  -KE Primary Wound Type: Skin tear  -KE Type: skin tear  -KE    Retired Wound - Properties Group Placement Date: 12/06/24  -KE Placement Time: 1440  -KE Side: Left  -KE Orientation: lower  -KE Location: leg  -KE Primary Wound Type: Skin tear  -KE Type: skin tear  -KE    Retired Wound - Properties Group Date first assessed: 12/06/24  -KE Time first assessed: 1440  -KE Side: Left  -KE Location: leg  -KE Primary Wound Type: Skin tear  -KE Type: skin tear  -KE      Row Name 12/18/24 1059          Positioning and Restraints    Pre-Treatment Position in bed  -SM     Post Treatment Position chair  -SM     In Chair reclined;with family/caregiver;with OT;with nsg;RUE elevated;LUE elevated;legs elevated  -       Row Name 12/18/24 1059          Progress Summary (PT)    Progress Toward Functional Goals (PT) progress toward functional goals is fair  -SM               User Key  (r) = Recorded By, (t) = Taken By, (c) = Cosigned By      Initials Name Provider Type    Stan Esposito, HIREN Registered Nurse    Jackelin Freitas, RN Registered Nurse    Neena Weaver RN Registered Nurse    Dana Adamson PTA Physical Therapist Assistant                    Physical Therapy Education        Title: PT OT SLP Therapies (In Progress)       Topic: Physical Therapy (In Progress)       Point: Mobility training (Done)       Learning Progress Summary            Patient Acceptance, E,TB, VU by AV at 12/17/2024 1454                      Point: Home exercise program (Not Started)       Learner Progress:  Not documented in this visit.              Point: Body mechanics (Done)       Learning Progress Summary            Patient Acceptance, E,TB, VU by AV at 12/17/2024 1454                      Point: Precautions (Done)       Learning Progress Summary            Patient Acceptance, E,TB, VU by AV at 12/17/2024 1454                                      User Key       Initials Effective Dates Name Provider Type Discipline    AV 06/11/21 -  Kaushik Chavez, PT Physical Therapist PT                  PT Recommendation and Plan     Progress Summary (PT)  Progress Toward Functional Goals (PT): progress toward functional goals is fair   Outcome Measures       Row Name 12/18/24 1105 12/17/24 1400          How much help from another person do you currently need...    Turning from your back to your side while in flat bed without using bedrails? 2  -SM 2  -AV     Moving from lying on back to sitting on the side of a flat bed without bedrails? 2  -SM 2  -AV     Moving to and from a bed to a chair (including a wheelchair)? 2  -SM 3  -AV     Standing up from a chair using your arms (e.g., wheelchair, bedside chair)? 3  -SM 3  -AV     Climbing 3-5 steps with a railing? 2  -SM 2  -AV     To walk in hospital room? 2  -SM 3  -AV     AM-PAC 6 Clicks Score (PT) 13  - 15  -AV        Functional Assessment    Outcome Measure Options -- AM-PAC 6 Clicks Basic Mobility (PT)  -AV               User Key  (r) = Recorded By, (t) = Taken By, (c) = Cosigned By      Initials Name Provider Type    AV Kaushik Chavez, PT Physical Therapist    Dana Adamson PTA Physical Therapist Assistant                     Time Calculation:    PT  Charges       Row Name 12/18/24 1059             Time Calculation    PT Received On 12/18/24  -         Timed Charges    09482 - PT Therapeutic Exercise Minutes 10  -SM      76458 - Gait Training Minutes  8  -SM      32401 - PT Therapeutic Activity Minutes 20  -         Total Minutes    Timed Charges Total Minutes 38  -       Total Minutes 38  -SM                User Key  (r) = Recorded By, (t) = Taken By, (c) = Cosigned By      Initials Name Provider Type    Dana Adamson PTA Physical Therapist Assistant                      PT G-Codes  Outcome Measure Options: AM-PAC 6 Clicks Basic Mobility (PT)  AM-PAC 6 Clicks Score (PT): 13  AM-PAC 6 Clicks Score (OT): 6    Dana Garcia PTA  12/18/2024

## 2024-12-18 NOTE — PLAN OF CARE
Goal Outcome Evaluation:           Progress: improving  Outcome Evaluation: Remains on 1.5L, up to chair x2 for 30 minutes, up 2 assist with walker, coretrak in place, remains strict NPO, HOB >30 degrees.

## 2024-12-19 ENCOUNTER — APPOINTMENT (OUTPATIENT)
Dept: GENERAL RADIOLOGY | Facility: HOSPITAL | Age: 79
End: 2024-12-19
Payer: MEDICARE

## 2024-12-19 LAB
ANION GAP SERPL CALCULATED.3IONS-SCNC: 10.3 MMOL/L (ref 5–15)
BASOPHILS # BLD AUTO: 0.04 10*3/MM3 (ref 0–0.2)
BASOPHILS NFR BLD AUTO: 0.7 % (ref 0–1.5)
BUN SERPL-MCNC: 41 MG/DL (ref 8–23)
BUN/CREAT SERPL: 13.2 (ref 7–25)
CALCIUM SPEC-SCNC: 9.4 MG/DL (ref 8.6–10.5)
CHLORIDE SERPL-SCNC: 90 MMOL/L (ref 98–107)
CO2 SERPL-SCNC: 28.7 MMOL/L (ref 22–29)
CREAT SERPL-MCNC: 3.11 MG/DL (ref 0.76–1.27)
DEPRECATED RDW RBC AUTO: 62.4 FL (ref 37–54)
EGFRCR SERPLBLD CKD-EPI 2021: 19.7 ML/MIN/1.73
EOSINOPHIL # BLD AUTO: 0.5 10*3/MM3 (ref 0–0.4)
EOSINOPHIL NFR BLD AUTO: 9 % (ref 0.3–6.2)
ERYTHROCYTE [DISTWIDTH] IN BLOOD BY AUTOMATED COUNT: 20 % (ref 12.3–15.4)
GLUCOSE BLDC GLUCOMTR-MCNC: 115 MG/DL (ref 70–99)
GLUCOSE BLDC GLUCOMTR-MCNC: 117 MG/DL (ref 70–99)
GLUCOSE BLDC GLUCOMTR-MCNC: 135 MG/DL (ref 70–99)
GLUCOSE BLDC GLUCOMTR-MCNC: 141 MG/DL (ref 70–99)
GLUCOSE BLDC GLUCOMTR-MCNC: 168 MG/DL (ref 70–99)
GLUCOSE SERPL-MCNC: 138 MG/DL (ref 65–99)
HCT VFR BLD AUTO: 29.2 % (ref 37.5–51)
HGB BLD-MCNC: 8.8 G/DL (ref 13–17.7)
IMM GRANULOCYTES # BLD AUTO: 0.03 10*3/MM3 (ref 0–0.05)
IMM GRANULOCYTES NFR BLD AUTO: 0.5 % (ref 0–0.5)
LYMPHOCYTES # BLD AUTO: 1.3 10*3/MM3 (ref 0.7–3.1)
LYMPHOCYTES NFR BLD AUTO: 23.5 % (ref 19.6–45.3)
MAGNESIUM SERPL-MCNC: 2.1 MG/DL (ref 1.6–2.4)
MCH RBC QN AUTO: 26.9 PG (ref 26.6–33)
MCHC RBC AUTO-ENTMCNC: 30.1 G/DL (ref 31.5–35.7)
MCV RBC AUTO: 89.3 FL (ref 79–97)
MONOCYTES # BLD AUTO: 0.76 10*3/MM3 (ref 0.1–0.9)
MONOCYTES NFR BLD AUTO: 13.7 % (ref 5–12)
NEUTROPHILS NFR BLD AUTO: 2.9 10*3/MM3 (ref 1.7–7)
NEUTROPHILS NFR BLD AUTO: 52.6 % (ref 42.7–76)
NRBC BLD AUTO-RTO: 0 /100 WBC (ref 0–0.2)
PHOSPHATE SERPL-MCNC: 3.8 MG/DL (ref 2.5–4.5)
PLATELET # BLD AUTO: 169 10*3/MM3 (ref 140–450)
PMV BLD AUTO: 10.8 FL (ref 6–12)
POTASSIUM SERPL-SCNC: 4.1 MMOL/L (ref 3.5–5.2)
RBC # BLD AUTO: 3.27 10*6/MM3 (ref 4.14–5.8)
SODIUM SERPL-SCNC: 129 MMOL/L (ref 136–145)
WBC NRBC COR # BLD AUTO: 5.53 10*3/MM3 (ref 3.4–10.8)

## 2024-12-19 PROCEDURE — 63710000001 INSULIN LISPRO (HUMAN) PER 5 UNITS: Performed by: NURSE PRACTITIONER

## 2024-12-19 PROCEDURE — 97530 THERAPEUTIC ACTIVITIES: CPT

## 2024-12-19 PROCEDURE — 97110 THERAPEUTIC EXERCISES: CPT

## 2024-12-19 PROCEDURE — 94799 UNLISTED PULMONARY SVC/PX: CPT

## 2024-12-19 PROCEDURE — 85025 COMPLETE CBC W/AUTO DIFF WBC: CPT | Performed by: STUDENT IN AN ORGANIZED HEALTH CARE EDUCATION/TRAINING PROGRAM

## 2024-12-19 PROCEDURE — 82948 REAGENT STRIP/BLOOD GLUCOSE: CPT | Performed by: NURSE PRACTITIONER

## 2024-12-19 PROCEDURE — 25010000002 MICAFUNGIN SODIUM 100 MG RECONSTITUTED SOLUTION 1 EACH VIAL: Performed by: INTERNAL MEDICINE

## 2024-12-19 PROCEDURE — 84100 ASSAY OF PHOSPHORUS: CPT | Performed by: INTERNAL MEDICINE

## 2024-12-19 PROCEDURE — 99233 SBSQ HOSP IP/OBS HIGH 50: CPT | Performed by: INTERNAL MEDICINE

## 2024-12-19 PROCEDURE — 83735 ASSAY OF MAGNESIUM: CPT | Performed by: INTERNAL MEDICINE

## 2024-12-19 PROCEDURE — 82948 REAGENT STRIP/BLOOD GLUCOSE: CPT

## 2024-12-19 PROCEDURE — 80048 BASIC METABOLIC PNL TOTAL CA: CPT | Performed by: INTERNAL MEDICINE

## 2024-12-19 PROCEDURE — 92526 ORAL FUNCTION THERAPY: CPT

## 2024-12-19 PROCEDURE — 71045 X-RAY EXAM CHEST 1 VIEW: CPT

## 2024-12-19 RX ADMIN — ARFORMOTEROL TARTRATE 15 MCG: 15 SOLUTION RESPIRATORY (INHALATION) at 18:48

## 2024-12-19 RX ADMIN — ACETAMINOPHEN 650 MG: 325 TABLET ORAL at 09:27

## 2024-12-19 RX ADMIN — INSULIN LISPRO 2 UNITS: 100 INJECTION, SOLUTION INTRAVENOUS; SUBCUTANEOUS at 23:42

## 2024-12-19 RX ADMIN — ACETAMINOPHEN 650 MG: 325 TABLET ORAL at 17:06

## 2024-12-19 RX ADMIN — WHITE PETROLATUM 1 APPLICATION: 1.75 OINTMENT TOPICAL at 09:28

## 2024-12-19 RX ADMIN — Medication 10 ML: at 09:28

## 2024-12-19 RX ADMIN — LEVOTHYROXINE SODIUM 175 MCG: 0.03 TABLET ORAL at 05:39

## 2024-12-19 RX ADMIN — BUDESONIDE 0.5 MG: 0.5 INHALANT ORAL at 18:48

## 2024-12-19 RX ADMIN — ACETAMINOPHEN 650 MG: 325 TABLET ORAL at 21:44

## 2024-12-19 RX ADMIN — Medication 10 ML: at 21:45

## 2024-12-19 RX ADMIN — BUDESONIDE 0.5 MG: 0.5 INHALANT ORAL at 09:59

## 2024-12-19 RX ADMIN — QUETIAPINE FUMARATE 12.5 MG: 25 TABLET ORAL at 21:44

## 2024-12-19 RX ADMIN — ARFORMOTEROL TARTRATE 15 MCG: 15 SOLUTION RESPIRATORY (INHALATION) at 09:59

## 2024-12-19 RX ADMIN — MICAFUNGIN SODIUM 100 MG: 100 INJECTION, POWDER, LYOPHILIZED, FOR SOLUTION INTRAVENOUS at 10:06

## 2024-12-19 RX ADMIN — Medication 2.5 MG: at 21:44

## 2024-12-19 RX ADMIN — FAMOTIDINE 20 MG: 20 TABLET, FILM COATED ORAL at 09:27

## 2024-12-19 RX ADMIN — ACETAMINOPHEN 650 MG: 325 TABLET ORAL at 04:17

## 2024-12-19 NOTE — THERAPY TREATMENT NOTE
Acute Care - Physical Therapy Treatment Note  BRIA Sullivan     Patient Name: Nelson Wang  : 1945  MRN: [id number]  Today's Date: 2024      Visit Dx:     ICD-10-CM ICD-9-CM   1. Acute respiratory failure with hypoxia  J96.01 518.81   2. Acute pulmonary edema  J81.0 518.4   3. Difficulty walking  R26.2 719.7   4. Oropharyngeal dysphagia  R13.12 787.22     Patient Active Problem List   Diagnosis    Essential hypertension    Bradycardia, sinus    Anemia due to chronic kidney disease    Hypothyroidism    Idiopathic gout    Proteinuria    Psoriasis    Thrombocytopenia    Type 2 diabetes mellitus without complication    CKD (chronic kidney disease), stage IV    Hyperlipidemia    Monoclonal gammopathy of unknown significance (MGUS)    Stage 5 chronic kidney disease    Chronic gout without tophus    Peritoneal dialysis catheter dysfunction    Medicare annual wellness visit, subsequent    Chronic cough    Peritonitis associated with peritoneal dialysis    Recurrent pneumonia    Acute on chronic respiratory failure with hypoxia    End stage renal disease    Aspiration pneumonitis    Sepsis    Type 2 diabetes mellitus, with long-term current use of insulin    GERD without esophagitis    Immunosuppression due to drug therapy    Bipolar disorder    Chronic respiratory failure with hypoxia    Shortness of breath    Abnormal stress test    ESRD on dialysis    Abnormal chest CT    Encounter for screening for malignant neoplasm of colon    History of colon polyps    Family history of colon cancer    Intractable pain    Abdominal pain    ESRD (end stage renal disease) on dialysis    AMS (altered mental status)    Hallucinations    LUQ pain    Discitis    Metabolic encephalopathy    Aspiration pneumonia    Discitis of lumbar region    Severe malnutrition    Dementia    Unspecified severe protein-calorie malnutrition    Hypoxia     Past Medical History:   Diagnosis Date    Abnormal stress test     Anemia     IRON INFUSIONS  WITH DIALYSIS    Anesthesia     WITH HIP REPLACEMENT DAUGHTER BELIEVES HAD SVT 2000    Ankle pain, right     Anxiety and depression     Arthritis     CKD (chronic kidney disease), stage IV     DIALYSIS [id number] SHILEY IN RIGHT CHEST    Diabetes     GERD (gastroesophageal reflux disease)     Gout     High cholesterol     History of peritoneal dialysis     HL (hearing loss)     Hyperkalemia     Hypertension     Hypothyroidism     Insomnia     Night terrors     Psoriasis     Psoriasis     Sleep apnea     Sleep walking     Thrombocytopenia     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    Vitiligo      Past Surgical History:   Procedure Laterality Date    ABDOMINAL SURGERY      ANKLE SURGERY Right 11/1990    APPENDECTOMY N/A 1954    ARTERIOVENOUS FISTULA/SHUNT SURGERY Left 07/16/2024    Procedure: Creation of left arm arteriovenous fistula;  Surgeon: Moses Mir MD;  Location: [facility] MAIN OR;  Service: Vascular;  Laterality: Left;    BRONCHOSCOPY N/A 03/01/2024    Procedure: BRONCHOSCOPY WITH BAL AND WASHINGS;  Surgeon: Sandra Espinal MD;  Location: [facility] ENDOSCOPY;  Service: Pulmonary;  Laterality: N/A;  PNEUMONIA    BRONCHOSCOPY N/A 08/30/2024    Procedure: BRONCHOSCOPY WITH BALS AND WASHINGS;  Surgeon: Sandra Espinal MD;  Location: [facility] ENDOSCOPY;  Service: Pulmonary;  Laterality: N/A;  MUCOUS PLUGGING    CARDIAC CATHETERIZATION N/A 05/29/2024    Procedure: Left Heart Cath with possible coronary angioplasty;  Surgeon: Stephan Nichols MD;  Location: [facility] CATH INVASIVE LOCATION;  Service: Cardiology;  Laterality: N/A;    COLONOSCOPY N/A 06/2022    [facility]    ENDOSCOPY N/A 10/28/2024    Procedure: ESOPHAGOGASTRODUODENOSCOPY INSERTION OF LIGHTED INSTRUMENT TO VIEW ESOPHAGUS, STOMACH AND SMALL INTESTINE;  Surgeon: Kingsley Peraza MD;  Location: [facility] ENDOSCOPY;  Service: Gastroenterology;  Laterality: N/A;  Gastritis    FRACTURE SURGERY      HIP BIPOLAR REPLACEMENT Right 01/2000     INSERTION HEMODIALYSIS CATHETER Left 04/09/2024    Procedure: HEMODIALYSIS CATHETER INSERTION;  Surgeon: Jose Berry MD;  [facility];  Service: General;  Laterality: Left;    INSERTION HEMODIALYSIS CATHETER N/A 04/12/2024    Procedure: Tunneled hemodialysis catheter insertion;  Surgeon: Enrique Vinson MD;  [facility];  Service: Vascular;  Laterality: N/A;    INSERTION PERITONEAL DIALYSIS CATHETER N/A 03/27/2023    Procedure: LAPAROSCOPIC INSERTION PERITONEAL DIALYSIS CATHETER, LAPAROSCOPIC OMENTOPEXY WITH LYSIS OF ADHESIONS;  Surgeon: Jose Berry MD;  [facility];  Service: General;  Laterality: N/A;    INSERTION PERITONEAL DIALYSIS CATHETER Left 07/23/2023    Procedure: REVISION OF PERITONEAL DIALYSIS CATHETER;  Surgeon: Radha Oreilly MD;  [facility];  Service: General;  Laterality: Left;    JOINT REPLACEMENT      REMOVAL PERITONEAL DIALYSIS CATHETER N/A 04/09/2024    Procedure: REMOVAL PERITONEAL DIALYSIS CATHETER;  Surgeon: Joes Berry MD;  Location: [facility] MAIN OR;  Service: General;  Laterality: N/A;    RENAL BIOPSY Left 07/15/2022    UPPER GASTROINTESTINAL ENDOSCOPY      VASCULAR SURGERY      WRIST SURGERY      UNSURE WHICH SIDE DAUGHTER REPORTS HAD SEVERE  CUT FROM WINDOW AND THEY HAD TO DO RECONSTRUCTIVE SURGERY WITH VESSELS AND NERVES     PT Assessment (Last 12 Hours)       PT Evaluation and Treatment       Row Name 12/19/24 7709          Physical Therapy Time and Intention    Subjective Information no complaints  -SM     Document Type therapy note (daily note)  -SM     Mode of Treatment individual therapy;physical therapy  -SM     Patient Effort adequate  -SM     Symptoms Noted During/After Treatment none  -SM     Comment Attempted x2, more alert at second attempt  -SM       Row Name 12/19/24 1140          Cognition    Follows Commands (Cognition) repetition of directions required;verbal cues/prompting  required;physical/tactile prompts required;visual cue;increased processing time needed  -       Row Name 12/19/24 1450          Bed Mobility    Supine-Sit [place] (Bed Mobility) moderate assist (50% patient effort)  -     Bed Mobility, Safety Issues cognitive deficits limit understanding;decreased use of arms for pushing/pulling;decreased use of legs for bridging/pushing  -     Assistive Device (Bed Mobility) head of bed elevated;repositioning sheet  -       Row Name 12/19/24 1450          Sit-Stand Transfer    Sit-Stand [place] (Transfers) minimum assist (75% patient effort)  -     Assistive Device (Sit-Stand Transfers) walker, front-wheeled  -       Row Name 12/19/24 1450          Stand-Sit Transfer    Stand-Sit [place] (Transfers) minimum assist (75% patient effort)  -     Assistive Device (Stand-Sit Transfers) walker, front-wheeled  -       Row Name 12/19/24 1450          Gait/Stairs (Locomotion)    Gait/Stairs Locomotion gait/ambulation assistive device  -     [place] Level (Gait) contact guard  -     Assistive Device (Gait) walker, front-wheeled  -     Patient was able to Ambulate yes  -     Distance in Feet (Gait) 5  bed to recliner  -     Deviations/Abnormal Patterns (Gait) festinating/shuffling;gait speed decreased  -     Bilateral Gait Deviations forward flexed posture;heel strike decreased  -       Row Name 12/19/24 1450          Safety Issues/Impairments Affecting Functional Mobility    Impairments Affecting Function (Mobility) balance;cognition;endurance/activity tolerance;strength  -       Row Name 12/19/24 1450          Balance    Dynamic Standing Balance contact guard;minimal assist  -     Position/Device Used, Standing Balance walker, front-wheeled  -       Row Name             Wound 11/15/24 1945 Right upper chest    Wound - Properties Group Placement Date: 11/15/24  -JR Placement Time: 1945  -JR Side: Right  -JR Orientation: upper  -JR  Location: chest  -JR Primary Wound Type: Incision  -JR    Retired Wound - Properties Group Placement Date: 11/15/24  -JR Placement Time: 1945 -JR Side: Right  -JR Orientation: upper  -JR Location: chest  -JR Primary Wound Type: Incision  -JR    Retired Wound - Properties Group Placement Date: 11/15/24  -JR Placement Time: 1945 -JR Side: Right  -JR Orientation: upper  -JR Location: chest  -JR Primary Wound Type: Incision  -JR    Retired Wound - Properties Group Date first assessed: 11/15/24  -JR Time first assessed: 1945 -JR Side: Right  -JR Location: chest  -JR Primary Wound Type: Incision  -JR      Row Name             Wound 12/05/24 2331 Right lower leg abrasion    Wound - Properties Group Placement Date: 12/05/24  -AW Placement Time: 2331  -AW Side: Right  -AW Orientation: lower  -AW Location: leg  -AW Primary Wound Type: Abrasion  -AW Type: abrasion  -AW Present on Original Admission: Y  -AW    Retired Wound - Properties Group Placement Date: 12/05/24  -AW Placement Time: 2331  -AW Present on Original Admission: Y  -AW Side: Right  -AW Orientation: lower  -AW Location: leg  -AW Primary Wound Type: Abrasion  -AW Type: abrasion  -AW    Retired Wound - Properties Group Placement Date: 12/05/24  -AW Placement Time: 2331  -AW Present on Original Admission: Y  -AW Side: Right  -AW Orientation: lower  -AW Location: leg  -AW Primary Wound Type: Abrasion  -AW Type: abrasion  -AW    Retired Wound - Properties Group Date first assessed: 12/05/24  -AW Time first assessed: 2331  -AW Present on Original Admission: Y  -AW Side: Right  -AW Location: leg  -AW Primary Wound Type: Abrasion  -AW Type: abrasion  -AW      Row Name             Wound 12/06/24 1440 Left lower leg skin tear    Wound - Properties Group Placement Date: 12/06/24  -KE Placement Time: 1440  -KE Side: Left  -KE Orientation: lower  -KE Location: leg  -KE Primary Wound Type: Skin tear  -KE Type: skin tear  -KE    Retired Wound - Properties Group Placement  Date: 12/06/24  -KE Placement Time: 1440  -KE Side: Left  -KE Orientation: lower  -KE Location: leg  -KE Primary Wound Type: Skin tear  -KE Type: skin tear  -KE    Retired Wound - Properties Group Placement Date: 12/06/24  -KE Placement Time: 1440  -KE Side: Left  -KE Orientation: lower  -KE Location: leg  -KE Primary Wound Type: Skin tear  -KE Type: skin tear  -KE    Retired Wound - Properties Group Date first assessed: 12/06/24  -KE Time first assessed: 1440  -KE Side: Left  -KE Location: leg  -KE Primary Wound Type: Skin tear  -KE Type: skin tear  -KE      Row Name 12/19/24 1450          Positioning and Restraints    Pre-Treatment Position in bed  -     Post Treatment Position chair  -     In Chair reclined;call light within reach;encouraged to call for assist;with family/caregiver;notified ns  -       Row Name 12/19/24 1450          Progress Summary (PT)    Progress Toward Functional Goals (PT) progress toward functional goals is fair  -               User Key  (r) = Recorded By, (t) = Taken By, (c) = Cosigned By      Initials Name Provider Type    Stan Esposito, RN Registered Nurse    Jackelin Freitas, RN Registered Nurse    Neena Weaver, RN Registered Nurse    Dana Adamson PTA Physical Therapist Assistant                    Physical Therapy Education       Title: PT OT SLP Therapies (In Progress)       Topic: Physical Therapy (In Progress)       Point: Mobility training (Done)       Learning Progress Summary            Patient Acceptance, E,TB, VU by AV at 12/17/2024 1454                      Point: Home exercise program (Not Started)       Learner Progress:  Not documented in this visit.              Point: Body mechanics (Done)       Learning Progress Summary            Patient Acceptance, E,TB, VU by AV at 12/17/2024 1454                      Point: Precautions (Done)       Learning Progress Summary            Patient Acceptance, E,TB, VU by AV at 12/17/2024 1454                                       User Key       Initials Effective Dates Name Provider Type Discipline    AV 06/11/21 -  Kaushik Chavez, PT Physical Therapist PT                  PT Recommendation and Plan     Progress Summary (PT)  Progress Toward Functional Goals (PT): progress toward functional goals is fair   Outcome Measures       Row Name 12/19/24 1454 12/18/24 1105 12/17/24 1400       How much help from another person do you currently need...    Turning from your back to your side while in flat bed without using bedrails? 3  -SM 2  -SM 2  -AV    Moving from lying on back to sitting on the side of a flat bed without bedrails? 2  -SM 2  -SM 2  -AV    Moving to and from a bed to a chair (including a wheelchair)? 3  -SM 2  -SM 3  -AV    Standing up from a chair using your arms (e.g., wheelchair, bedside chair)? 3  -SM 3  -SM 3  -AV    Climbing 3-5 steps with a railing? 1  -SM 2  -SM 2  -AV    To walk in hospital room? 1  -SM 2  -SM 3  -AV    AM-PAC 6 Clicks Score (PT) 13  -SM 13  -SM 15  -AV       Functional Assessment    Outcome Measure Options -- -- AM-PAC 6 Clicks Basic Mobility (PT)  -AV              User Key  (r) = Recorded By, (t) = Taken By, (c) = Cosigned By      Initials Name Provider Type    AV Kaushik Chavez, PT Physical Therapist    Dana Adamson PTA Physical Therapist Assistant                     Time Calculation:    PT Charges       Row Name 12/19/24 1449             Time Calculation    PT Received On 12/19/24  -SM         Timed Charges    83088 - PT Therapeutic Exercise Minutes 8  -SM      00579 - Gait Training Minutes  8  -SM      05792 - PT Therapeutic Activity Minutes 30  -SM         Total Minutes    Timed Charges Total Minutes 46  -SM       Total Minutes 46  -SM                User Key  (r) = Recorded By, (t) = Taken By, (c) = Cosigned By      Initials Name Provider Type    Dana Adamson PTA Physical Therapist Assistant                  Therapy Charges for Today       Code Description Service Date  Service Provider Modifiers Qty    22074752660 HC PT THER PROC EA 15 MIN 12/18/2024 Dana Garcia, PTA GP 1    69765503303 HC GAIT TRAINING EA 15 MIN 12/18/2024 Dana Garcia, PTA GP 1    39943952827 HC PT THERAPEUTIC ACT EA 15 MIN 12/18/2024 Dana Garcia, GREER GP 1            PT G-Codes  Outcome Measure Options: AM-PAC 6 Clicks Daily Activity (OT), Optimal Instrument  AM-PAC 6 Clicks Score (PT): 13  AM-PAC 6 Clicks Score (OT): 7    Dana Garcia PTA  12/19/2024

## 2024-12-19 NOTE — THERAPY TREATMENT NOTE
Acute Care - Speech Language Pathology   Swallow Treatment Note BRIA Sullivan     Patient Name: Nelson Wang  : 1945  MRN: [id number]  Today's Date: 2024               Admit Date: 2024    Visit Dx:     ICD-10-CM ICD-9-CM   1. Acute respiratory failure with hypoxia  J96.01 518.81   2. Acute pulmonary edema  J81.0 518.4   3. Difficulty walking  R26.2 719.7   4. Oropharyngeal dysphagia  R13.12 787.22     Patient Active Problem List   Diagnosis    Essential hypertension    Bradycardia, sinus    Anemia due to chronic kidney disease    Hypothyroidism    Idiopathic gout    Proteinuria    Psoriasis    Thrombocytopenia    Type 2 diabetes mellitus without complication    CKD (chronic kidney disease), stage IV    Hyperlipidemia    Monoclonal gammopathy of unknown significance (MGUS)    Stage 5 chronic kidney disease    Chronic gout without tophus    Peritoneal dialysis catheter dysfunction    Medicare annual wellness visit, subsequent    Chronic cough    Peritonitis associated with peritoneal dialysis    Recurrent pneumonia    Acute on chronic respiratory failure with hypoxia    End stage renal disease    Aspiration pneumonitis    Sepsis    Type 2 diabetes mellitus, with long-term current use of insulin    GERD without esophagitis    Immunosuppression due to drug therapy    Bipolar disorder    Chronic respiratory failure with hypoxia    Shortness of breath    Abnormal stress test    ESRD on dialysis    Abnormal chest CT    Encounter for screening for malignant neoplasm of colon    History of colon polyps    Family history of colon cancer    Intractable pain    Abdominal pain    ESRD (end stage renal disease) on dialysis    AMS (altered mental status)    Hallucinations    LUQ pain    Discitis    Metabolic encephalopathy    Aspiration pneumonia    Discitis of lumbar region    Severe malnutrition    Dementia    Unspecified severe protein-calorie malnutrition    Hypoxia     Past Medical History:   Diagnosis Date     Abnormal stress test     Anemia     IRON INFUSIONS WITH DIALYSIS    Anesthesia     WITH HIP REPLACEMENT DAUGHTER BELIEVES HAD SVT 2000    Ankle pain, right     Anxiety and depression     Arthritis     CKD (chronic kidney disease), stage IV     DIALYSIS [id number] SHILEY IN RIGHT CHEST    Diabetes     GERD (gastroesophageal reflux disease)     Gout     High cholesterol     History of peritoneal dialysis     HL (hearing loss)     Hyperkalemia     Hypertension     Hypothyroidism     Insomnia     Night terrors     Psoriasis     Psoriasis     Sleep apnea     Sleep walking     Thrombocytopenia     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    Vitiligo      Past Surgical History:   Procedure Laterality Date    ABDOMINAL SURGERY      ANKLE SURGERY Right 11/1990    APPENDECTOMY N/A 1954    ARTERIOVENOUS FISTULA/SHUNT SURGERY Left 07/16/2024    Procedure: Creation of left arm arteriovenous fistula;  Surgeon: Moses Mir MD;  Location: [facility] MAIN OR;  Service: Vascular;  Laterality: Left;    BRONCHOSCOPY N/A 03/01/2024    Procedure: BRONCHOSCOPY WITH BAL AND WASHINGS;  Surgeon: Sandra Espinal MD;  Location: [facility] ENDOSCOPY;  Service: Pulmonary;  Laterality: N/A;  PNEUMONIA    BRONCHOSCOPY N/A 08/30/2024    Procedure: BRONCHOSCOPY WITH BALS AND WASHINGS;  Surgeon: Sandra Espinal MD;  Location: [facility] ENDOSCOPY;  Service: Pulmonary;  Laterality: N/A;  MUCOUS PLUGGING    CARDIAC CATHETERIZATION N/A 05/29/2024    Procedure: Left Heart Cath with possible coronary angioplasty;  Surgeon: Stephan Nichols MD;  Location: [facility] CATH INVASIVE LOCATION;  Service: Cardiology;  Laterality: N/A;    COLONOSCOPY N/A 06/2022    [facility]    ENDOSCOPY N/A 10/28/2024    Procedure: ESOPHAGOGASTRODUODENOSCOPY INSERTION OF LIGHTED INSTRUMENT TO VIEW ESOPHAGUS, STOMACH AND SMALL INTESTINE;  Surgeon: Kingsley Peraza MD;  Location: [facility] ENDOSCOPY;  Service: Gastroenterology;  Laterality: N/A;  Gastritis    FRACTURE SURGERY       HIP BIPOLAR REPLACEMENT Right 01/2000    INSERTION HEMODIALYSIS CATHETER Left 04/09/2024    Procedure: HEMODIALYSIS CATHETER INSERTION;  Surgeon: Jose Berry MD;  [facility];  Service: General;  Laterality: Left;    INSERTION HEMODIALYSIS CATHETER N/A 04/12/2024    Procedure: Tunneled hemodialysis catheter insertion;  Surgeon: Enrique Vinson MD;  [facility];  Service: Vascular;  Laterality: N/A;    INSERTION PERITONEAL DIALYSIS CATHETER N/A 03/27/2023    Procedure: LAPAROSCOPIC INSERTION PERITONEAL DIALYSIS CATHETER, LAPAROSCOPIC OMENTOPEXY WITH LYSIS OF ADHESIONS;  Surgeon: Jose Berry MD;  [facility];  Service: General;  Laterality: N/A;    INSERTION PERITONEAL DIALYSIS CATHETER Left 07/23/2023    Procedure: REVISION OF PERITONEAL DIALYSIS CATHETER;  Surgeon: Radha Oreilly MD;  [facility];  Service: General;  Laterality: Left;    JOINT REPLACEMENT      REMOVAL PERITONEAL DIALYSIS CATHETER N/A 04/09/2024    Procedure: REMOVAL PERITONEAL DIALYSIS CATHETER;  Surgeon: Jose Berry MD;  Location: [facility] MAIN OR;  Service: General;  Laterality: N/A;    RENAL BIOPSY Left 07/15/2022    UPPER GASTROINTESTINAL ENDOSCOPY      VASCULAR SURGERY      WRIST SURGERY      UNSURE WHICH SIDE DAUGHTER REPORTS HAD SEVERE  CUT FROM WINDOW AND THEY HAD TO DO RECONSTRUCTIVE SURGERY WITH VESSELS AND NERVES       SPEECH PATHOLOGY DYSPHAGIA TREATMENT    Subjective/Behavioral Observations: Alert and cooperative, attention to task varies.      Day/time of Treatment: 12/19/2024      Current Diet: Purée, nectar thick.  Patient with core track.      Current Strategies:One-on-one assist to feed self only when patient is fully awake, alert, and participating.  Alternate small bites and small sips of solids and liquids at a slow rate.  May utilize controlled straw drink with nectar liquid.  Check swallow each bite/sip.       Treatment  received: Dysphagia therapy to address swallow function through exercises and education of strategies.      Results of treatment: Patient requiring moderate cueing to maintain positioning.  Patient required moderate cueing throughout meal.  Patient taking sips of nectar liquid by straw with minimal assistance, vocal quality remaining clear.  Adequate chew and minimal delay of swallow with puréed solids.  Cough noted x 2 during session.      Progress toward goals: Adequate      Barriers to Achieving goals: Medical status      Plan of care:/changes in plan: Continue per current plan.  Continue with diet of purée solids and nectar thickened liquid.  Positioning fully upright for all p.o. intake and 30 minutes following.  One-to-one assist to feed, alternate small bites and small sips at a slow rate.  Check swallow each bite/sip.                                      Anticipated Discharge Disposition (SLP): home with 24/7 care (12/18/24 1140)                                                               EDUCATION  The patient has been educated in the following areas:   Modified Diet Instruction.                Time Calculation:    Time Calculation- SLP       Row Name 12/19/24 1021             Time Calculation- SLP    SLP Stop Time 0930  -TB      SLP Received On 12/19/24  -TB         Untimed Charges    29791-YF Treatment Swallow Minutes 40  -TB         Total Minutes    Untimed Charges Total Minutes 40  -TB       Total Minutes 40  -TB                User Key  (r) = Recorded By, (t) = Taken By, (c) = Cosigned By      Initials Name Provider Type    TB Katie Isaacs SLP Speech and Language Pathologist                    Therapy Charges for Today       Code Description Service Date Service Provider Modifiers Qty    35186507315 HC ST EVAL ORAL PHARYNG SWALLOW 4 12/18/2024 Katie Isaacs SLP GN 1    [id number] HC ST TREATMENT SWALLOW 3 12/19/2024 Katie Isaacs SLP GN 1                 ADILSON Álvarez  12/19/2024

## 2024-12-19 NOTE — THERAPY TREATMENT NOTE
Patient Name: Nelson Wang  : 1945    MRN: [id number]                              Today's Date: 2024       Admit Date: 2024    Visit Dx:     ICD-10-CM ICD-9-CM   1. Acute respiratory failure with hypoxia  J96.01 518.81   2. Acute pulmonary edema  J81.0 518.4   3. Difficulty walking  R26.2 719.7   4. Oropharyngeal dysphagia  R13.12 787.22     Patient Active Problem List   Diagnosis    Essential hypertension    Bradycardia, sinus    Anemia due to chronic kidney disease    Hypothyroidism    Idiopathic gout    Proteinuria    Psoriasis    Thrombocytopenia    Type 2 diabetes mellitus without complication    CKD (chronic kidney disease), stage IV    Hyperlipidemia    Monoclonal gammopathy of unknown significance (MGUS)    Stage 5 chronic kidney disease    Chronic gout without tophus    Peritoneal dialysis catheter dysfunction    Medicare annual wellness visit, subsequent    Chronic cough    Peritonitis associated with peritoneal dialysis    Recurrent pneumonia    Acute on chronic respiratory failure with hypoxia    End stage renal disease    Aspiration pneumonitis    Sepsis    Type 2 diabetes mellitus, with long-term current use of insulin    GERD without esophagitis    Immunosuppression due to drug therapy    Bipolar disorder    Chronic respiratory failure with hypoxia    Shortness of breath    Abnormal stress test    ESRD on dialysis    Abnormal chest CT    Encounter for screening for malignant neoplasm of colon    History of colon polyps    Family history of colon cancer    Intractable pain    Abdominal pain    ESRD (end stage renal disease) on dialysis    AMS (altered mental status)    Hallucinations    LUQ pain    Discitis    Metabolic encephalopathy    Aspiration pneumonia    Discitis of lumbar region    Severe malnutrition    Dementia    Unspecified severe protein-calorie malnutrition    Hypoxia     Past Medical History:   Diagnosis Date    Abnormal stress test     Anemia     IRON INFUSIONS  WITH DIALYSIS    Anesthesia     WITH HIP REPLACEMENT DAUGHTER BELIEVES HAD SVT 2000    Ankle pain, right     Anxiety and depression     Arthritis     CKD (chronic kidney disease), stage IV     DIALYSIS [id number] SHILEY IN RIGHT CHEST    Diabetes     GERD (gastroesophageal reflux disease)     Gout     High cholesterol     History of peritoneal dialysis     HL (hearing loss)     Hyperkalemia     Hypertension     Hypothyroidism     Insomnia     Night terrors     Psoriasis     Psoriasis     Sleep apnea     Sleep walking     Thrombocytopenia     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    Vitiligo      Past Surgical History:   Procedure Laterality Date    ABDOMINAL SURGERY      ANKLE SURGERY Right 11/1990    APPENDECTOMY N/A 1954    ARTERIOVENOUS FISTULA/SHUNT SURGERY Left 07/16/2024    Procedure: Creation of left arm arteriovenous fistula;  Surgeon: Moses Mir MD;  Location: [facility] MAIN OR;  Service: Vascular;  Laterality: Left;    BRONCHOSCOPY N/A 03/01/2024    Procedure: BRONCHOSCOPY WITH BAL AND WASHINGS;  Surgeon: Sandra Espinal MD;  Location: [facility] ENDOSCOPY;  Service: Pulmonary;  Laterality: N/A;  PNEUMONIA    BRONCHOSCOPY N/A 08/30/2024    Procedure: BRONCHOSCOPY WITH BALS AND WASHINGS;  Surgeon: Sandra Espinal MD;  Location: [facility] ENDOSCOPY;  Service: Pulmonary;  Laterality: N/A;  MUCOUS PLUGGING    CARDIAC CATHETERIZATION N/A 05/29/2024    Procedure: Left Heart Cath with possible coronary angioplasty;  Surgeon: Stephan Nichols MD;  Location: [facility] CATH INVASIVE LOCATION;  Service: Cardiology;  Laterality: N/A;    COLONOSCOPY N/A 06/2022    [facility]    ENDOSCOPY N/A 10/28/2024    Procedure: ESOPHAGOGASTRODUODENOSCOPY INSERTION OF LIGHTED INSTRUMENT TO VIEW ESOPHAGUS, STOMACH AND SMALL INTESTINE;  Surgeon: Kingsley Peraza MD;  Location: [facility] ENDOSCOPY;  Service: Gastroenterology;  Laterality: N/A;  Gastritis    FRACTURE SURGERY      HIP BIPOLAR REPLACEMENT Right 01/2000     INSERTION HEMODIALYSIS CATHETER Left 04/09/2024    Procedure: HEMODIALYSIS CATHETER INSERTION;  Surgeon: Jose Berry MD;  [facility];  Service: General;  Laterality: Left;    INSERTION HEMODIALYSIS CATHETER N/A 04/12/2024    Procedure: Tunneled hemodialysis catheter insertion;  Surgeon: Enrique Vinson MD;  [facility];  Service: Vascular;  Laterality: N/A;    INSERTION PERITONEAL DIALYSIS CATHETER N/A 03/27/2023    Procedure: LAPAROSCOPIC INSERTION PERITONEAL DIALYSIS CATHETER, LAPAROSCOPIC OMENTOPEXY WITH LYSIS OF ADHESIONS;  Surgeon: Jose Berry MD;  [facility];  Service: General;  Laterality: N/A;    INSERTION PERITONEAL DIALYSIS CATHETER Left 07/23/2023    Procedure: REVISION OF PERITONEAL DIALYSIS CATHETER;  Surgeon: Radha Oreilly MD;  [facility];  Service: General;  Laterality: Left;    JOINT REPLACEMENT      REMOVAL PERITONEAL DIALYSIS CATHETER N/A 04/09/2024    Procedure: REMOVAL PERITONEAL DIALYSIS CATHETER;  Surgeon: Jose Berry MD;  Location: [facility] MAIN OR;  Service: General;  Laterality: N/A;    RENAL BIOPSY Left 07/15/2022    UPPER GASTROINTESTINAL ENDOSCOPY      VASCULAR SURGERY      WRIST SURGERY      UNSURE WHICH SIDE DAUGHTER REPORTS HAD SEVERE  CUT FROM WINDOW AND THEY HAD TO DO RECONSTRUCTIVE SURGERY WITH VESSELS AND NERVES      General Information       Row Name 12/19/24 1312          OT Time and Intention    Document Type therapy note (daily note)  -SC     Mode of Treatment individual therapy;occupational therapy  -SC     Patient Effort fair  -SC     Comment Patient confused throughout session but able to follow simple commands intermittently.  -SC       Row Name 12/19/24 9882          General Information    Patient Profile Reviewed yes  -SC     Existing Precautions/Restrictions fall;oxygen therapy device and L/min  -SC       Row Name 12/19/24 8174          Cognition    Orientation Status  (Cognition) oriented to;person  Patient able to state name and   but was difficult to understand. He was able to follow most simple commands but was easily distracted.  -SC       Row Name 24 1315          Safety Issues/Impairments Affecting Functional Mobility    Impairments Affecting Function (Mobility) balance;cognition;endurance/activity tolerance;strength  -SC     Cognitive Impairments, Mobility Safety/Performance insight into deficits/self-awareness;awareness, need for assistance;problem-solving/reasoning  -SC               User Key  (r) = Recorded By, (t) = Taken By, (c) = Cosigned By      Initials Name Provider Type    SC Celia Ballard OT Occupational Therapist                     Mobility/ADL's       Row Name 24 131          Bed Mobility    Bed Mobility supine-sit  -SC     All Activities, [place] (Bed Mobility) moderate assist (50% patient effort)  -SC     Supine-Sit [place] (Bed Mobility) moderate assist (50% patient effort);2 person assist  -SC     Bed Mobility, Safety Issues cognitive deficits limit understanding;decreased use of arms for pushing/pulling;decreased use of legs for bridging/pushing  -SC     Assistive Device (Bed Mobility) head of bed elevated;repositioning sheet  -SC       Row Name 24 131          Transfers    Transfers sit-stand transfer;stand-sit transfer  -SC       Row Name 24 131          Bed-Chair Transfer    Bed-Chair [place] (Transfers) minimum assist (75% patient effort)  -SC     Assistive Device (Bed-Chair Transfers) walker, front-wheeled  -SC       Row Name 24 131          Sit-Stand Transfer    Sit-Stand [place] (Transfers) minimum assist (75% patient effort)  -SC     Assistive Device (Sit-Stand Transfers) walker, front-wheeled  -SC     Comment, (Sit-Stand Transfer) STS x 3 reps; max vc for position of hands for pushing up from bed to walker  -SC       Row Name 24 131          Stand-Sit Transfer    Stand-Sit  [place] (Transfers) minimum assist (75% patient effort)  -SC     Assistive Device (Stand-Sit Transfers) walker, front-wheeled  -SC               User Key  (r) = Recorded By, (t) = Taken By, (c) = Cosigned By      Initials Name Provider Type    Celia Zurita OT Occupational Therapist                   Obj/Interventions       Row Name 12/19/24 1324          Shoulder (Therapeutic Exercise)    Shoulder (Therapeutic Exercise) AROM (active range of motion)  -SC     Shoulder AROM (Therapeutic Exercise) bilateral;flexion;extension;horizontal aBduction/aDduction;5 repetitions;2 sets  Patient required max vc and tactile cues to remain on task  and complete exercise with correct technique.  -[facility] Name 12/19/24 1324          Elbow/Forearm (Therapeutic Exercise)    Elbow/Forearm (Therapeutic Exercise) AROM (active range of motion)  -SC     Elbow/Forearm AROM (Therapeutic Exercise) bilateral;flexion;extension;sitting;5 repetitions;2 sets  -[facility] Name 12/19/24 1324          Motor Skills    Motor Skills functional endurance  -SC     Functional Endurance poor+  -SC     Therapeutic Exercise shoulder;elbow/forearm  -[facility] Name 12/19/24 1324          Balance    Balance Assessment standing static balance  -SC     Static Sitting Balance contact guard  -SC     Dynamic Sitting Balance minimal assist  -SC     Position, Sitting Balance unsupported;sitting edge of bed  -SC     Static Standing Balance moderate assist  -SC     Position/Device Used, Standing Balance walker, front-wheeled  -SC     Balance Interventions weight shifting activity  -SC     Comment, Balance STS x 3 reps with CGA-mod assist to maintain static balance.  -SC               User Key  (r) = Recorded By, (t) = Taken By, (c) = Cosigned By      Initials Name Provider Type    Celia Zurita OT Occupational Therapist                   Goals/Plan    No documentation.                  Clinical Impression       Row Name 12/19/24 1326          Pain  Scale: FACES Pre/Post-Treatment    Pain: FACES Scale, Pretreatment 0-->no hurt  -SC     Posttreatment Pain Rating 0-->no hurt  -SC       Row Name 12/19/24 1326          Plan of Care Review    Progress improving  -SC     Outcome Evaluation Patient continues to make slow but steady progress in skilled OT. He is now able to sit at EOB and participate in light grooming activities and UB AROM exercise. Barriers to increased function include cognition, strength, balance, and activity tolerance.  -SC       Row Name 12/19/24 1326          Positioning and Restraints    Pre-Treatment Position in bed  -SC     Post Treatment Position bed  -SC     In Bed call light within reach;encouraged to call for assist;exit alarm on  -SC               User Key  (r) = Recorded By, (t) = Taken By, (c) = Cosigned By      Initials Name Provider Type    SC Celia Ballard OT Occupational Therapist                   Outcome Measures       Row Name 12/19/24 1322          How much help from another is currently needed...    Putting on and taking off regular lower body clothing? 1  -SC     Bathing (including washing, rinsing, and drying) 1  -SC     Toileting (which includes using toilet bed pan or urinal) 1  -SC     Putting on and taking off regular upper body clothing 1  -SC     Taking care of personal grooming (such as brushing teeth) 2  -SC     Eating meals 1  -SC     AM-PAC 6 Clicks Score (OT) 7  -SC       Row Name 12/19/24 3295          How much help from another person do you currently need...    Turning from your back to your side while in flat bed without using bedrails? 3  -NO     Moving from lying on back to sitting on the side of a flat bed without bedrails? 2  -NO     Moving to and from a bed to a chair (including a wheelchair)? 2  -NO     Standing up from a chair using your arms (e.g., wheelchair, bedside chair)? 2  -NO     Climbing 3-5 steps with a railing? 1  -NO     To walk in hospital room? 1  -NO     AM-PAC 6 Clicks Score (PT) 11   -NO     Highest Level of Mobility Goal 4 --> Transfer to chair/commode  -NO       Row Name 12/19/24 1327          Functional Assessment    Outcome Measure Options AM-PAC 6 Clicks Daily Activity (OT);Optimal Instrument  -SC       Row Name 12/19/24 1327          Optimal Instrument    Optimal Instrument Optimal - 3  -SC     Bending/Stooping 5  -SC     Standing 2  -SC     Reaching 2  -SC               User Key  (r) = Recorded By, (t) = Taken By, (c) = Cosigned By      Initials Name Provider Type    Linnette Souza RN Registered Nurse    Celia Zurita OT Occupational Therapist                    Occupational Therapy Education       Title: PT OT SLP Therapies (In Progress)       Topic: Occupational Therapy (In Progress)       Point: ADL training (In Progress)       Description:   Instruct learner(s) on proper safety adaptation and remediation techniques during self care or transfers.   Instruct in proper use of assistive devices.                  Learning Progress Summary            Patient Acceptance, E, NL by PG at 12/13/2024 0945                      Point: Home exercise program (In Progress)       Description:   Instruct learner(s) on appropriate technique for monitoring, assisting and/or progressing therapeutic exercises/activities.                  Learning Progress Summary            Patient Acceptance, E, NL by PG at 12/13/2024 0945                      Point: Precautions (In Progress)       Description:   Instruct learner(s) on prescribed precautions during self-care and functional transfers.                  Learning Progress Summary            Patient Acceptance, E, NL by PG at 12/13/2024 0945                      Point: Body mechanics (In Progress)       Description:   Instruct learner(s) on proper positioning and spine alignment during self-care, functional mobility activities and/or exercises.                  Learning Progress Summary            Patient Acceptance, E, NL by PG at 12/13/2024 0945                                       User Key       Initials Effective Dates Name Provider Type Discipline    PG 06/16/21 -  Chauncey Goldberg OT Occupational Therapist OT                  OT Recommendation and Plan     Plan of Care Review  Progress: improving  Outcome Evaluation: Patient continues to make slow but steady progress in skilled OT. He is now able to sit at EOB and participate in light grooming activities and UB AROM exercise. Barriers to increased function include cognition, strength, balance, and activity tolerance.     Time Calculation:         Time Calculation- OT       Row Name 12/19/24 1328             Time Calculation- OT    OT Received On 12/19/24  -SC         Timed Charges    95463 - OT Therapeutic Exercise Minutes 14  -SC      27189 - OT Therapeutic Activity Minutes 10  -SC         Total Minutes    Timed Charges Total Minutes 24  -SC       Total Minutes 24  -SC                User Key  (r) = Recorded By, (t) = Taken By, (c) = Cosigned By      Initials Name Provider Type    SC Celia Ballard OT Occupational Therapist                  Therapy Charges for Today       Code Description Service Date Service Provider Modifiers Qty    [id number]  OT THERAPEUTIC ACT EA 15 MIN 12/19/2024 Celia Ballard OT GO 1    [id number]  OT THER PROC EA 15 MIN 12/19/2024 Celia Ballard OT GO 1                 Celia Ballard OT  12/19/2024

## 2024-12-19 NOTE — PLAN OF CARE
Goal Outcome Evaluation:    Pt is confused, assist x2 with walker. DM II, blood sugars q6h have been good throughout the night. Family has been bedside all night. CXR completed this morning.

## 2024-12-19 NOTE — PROGRESS NOTES
[facility]     Nephrology Progress Note      Patient Name: Nelson Wang  : 1945  MRN: [id number]  Primary Care Physician:  Domingo Bravo MD  Date of admission: 2024    Subjective   Subjective     Interval History:  No new events.  Pt somnolent, confused  NG in place.      Objective   Objective     Vitals:   Temp:  [97.3 °F (36.3 °C)-98.1 °F (36.7 °C)] 97.9 °F (36.6 °C)  Heart Rate:  [81-97] 94  Resp:  [16-18] 18  BP: ()/(45-79) 116/55  Flow (L/min) (Oxygen Therapy):  [1.5] 1.5  Physical Exam:   Constitutional: Altered and lethargic.   Eyes: sclerae anicteric, no conjunctival injection   HENT: mucous membranes moist.  NG in place.   Neck: Supple, no thyromegaly, no lymphadenopathy, trachea midline, No JVD   Respiratory: Decreased to auscultation bilaterally, nonlabored respirations, some upper airway rhonchi.   Cardiovascular: RRR, no murmurs, rubs, or gallops.   Gastrointestinal: Positive bowel sounds, soft, nontender, nondistended   Musculoskeletal: No edema, no clubbing or cyanosis.  Left upper extremity AV fistula, patent.   Neurologic: Arousable, moving extremities, did not follow commands to me..  Inconsistent.  Restless.   Skin: warm and dry, no rashes, areas of hypopigmentation.    Result Review    Result Reviewed:  I have personally reviewed the results from the time of this admission to 2024 07:20 EST and agree with these findings:  [x]  Laboratory  []  Microbiology  [x]  Radiology  []  EKG/Telemetry   []  Cardiology/Vascular   []  Pathology  [x]  Old records  []  Other:        Lab 24  0449 24  1453 24  0554 24  0452 12/15/24  0445 24  0428 24  1416 24  0319   SODIUM 129* 127* 129* 129* 134* 136  --  140   POTASSIUM 4.1 4.1 4.2 4.3 4.2 3.7  --  4.1   CHLORIDE 90* 93* 93* 92* 94* 98  --  102   CO2 28.7 24.3 26.2 18.7* 21.0* 25.1  --  24.9   BUN 41* 72* 47* 88* 65* 43*  --  66*   CREATININE 3.11* 5.02* 3.63* 6.03* 4.90* 3.00*  --   5.19*   GLUCOSE 138* 151* 159* 216* 178* 242*  --  266*   EGFR 19.7* 11.1* 16.4* 8.9* 11.4* 20.6*  --  10.7*   ANION GAP 10.3 9.7 9.8 18.3* 19.0* 12.9  --  13.1   MAGNESIUM 2.1 2.2 2.2 2.3 2.2 2.0  --  3.1*   PHOSPHORUS 3.8 6.1* 4.3 5.8* 4.8* 4.3  4.3 1.6* 2.2*             Assessment & Plan   Assessment / Plan       Active Hospital Problems:  Active Hospital Problems    Diagnosis     **Hypoxia        Assessment and Plan:    - ESRD, was on home dialysis.  Left upper extremity AV fistula for access.  Electrolytes are stable.  Dialysis m/w/f.  UF 1 kg as tolerated.       - Volume overload, much better now.   Sodium on low side, reduced flushes to 20ml q4hrs.      - Aspiration pneumonia, and possibly recurrent aspiration, per pulmonary.      - Type 2 diabetes with complications.     - Hypotension blood pressure is acceptable now, on ProAmatine and blood pressure stable.     - Anemia of chronic kidney disease, hemoglobin 10.7 with goal greater than 10, on RONALD as outpatient.  Getting Epogen while here.     - Altered mentation.  Multifactorial.

## 2024-12-19 NOTE — PROGRESS NOTES
"Nutrition Services    Patient Name: Nelson Wang  YOB: 1945  MRN: [id number]  Admission date: 12/5/2024      CLINICAL NUTRITION ASSESSMENT      Reason for Assessment  EN Follow Up   H&P:  Past Medical History:   Diagnosis Date    Abnormal stress test     Anemia     IRON INFUSIONS WITH DIALYSIS    Anesthesia     WITH HIP REPLACEMENT DAUGHTER BELIEVES HAD SVT 2000    Ankle pain, right     Anxiety and depression     Arthritis     CKD (chronic kidney disease), stage IV     DIALYSIS [id number] SHILEY IN RIGHT CHEST    Diabetes     GERD (gastroesophageal reflux disease)     Gout     High cholesterol     History of peritoneal dialysis     HL (hearing loss)     Hyperkalemia     Hypertension     Hypothyroidism     Insomnia     Night terrors     Psoriasis     Psoriasis     Sleep apnea     Sleep walking     Thrombocytopenia     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    Vitiligo         Current Problems:   Active Hospital Problems    Diagnosis     **Hypoxia         Nutrition/Diet History         Narrative   Pt remains on EN and po diet has been advanced; Diabetic, Puree per SLP recommendation (25 % intake). Tube feed at goal rate of 40 ml/hr and tolerated well, with no issues noted. Prosource BID given for additional protein. Continue nutrition interventions and RD to follow per protocol.      Anthropometrics        Current Height, Weight Height: 170.2 cm (67\")  Weight: 65.3 kg (143 lb 15.4 oz)   Current BMI Body mass index is 22.55 kg/m².   BMI Classification Normal range Healthy range for age 23-30   % IBW 95%   Adjusted Body Weight (ABW) N/A   Weight Hx  Wt Readings from Last 15 Encounters:   12/18/24 2009 65.3 kg (143 lb 15.4 oz)   12/18/24 0300 67 kg (147 lb 11.3 oz)   12/17/24 0440 65.8 kg (145 lb 1 oz)   12/16/24 0547 64.3 kg (141 lb 12.1 oz)   12/13/24 0407 62.5 kg (137 lb 12.6 oz)   12/12/24 0500 69.3 kg (152 lb 12.5 oz)   12/09/24 0600 70.2 kg (154 lb 12.2 oz)   12/06/24 1228 70.7 kg (155 lb " 13.8 oz)   12/05/24 1850 75.3 kg (166 lb 0.1 oz)   12/01/24 1701 71 kg (156 lb 8.4 oz)   11/22/24 1311 71.8 kg (158 lb 3.2 oz)   11/18/24 0351 67.9 kg (149 lb 11.1 oz)   11/17/24 0255 71.9 kg (158 lb 8.2 oz)   11/16/24 0316 67.9 kg (149 lb 11.1 oz)   11/14/24 0611 73.9 kg (162 lb 14.7 oz)   11/13/24 0500 73 kg (161 lb)   11/12/24 0523 73.2 kg (161 lb 6 oz)   11/11/24 1200 72.8 kg (160 lb 7.9 oz)   11/10/24 0500 74.4 kg (164 lb 0.4 oz)   11/08/24 0640 73 kg (160 lb 14.4 oz)   11/07/24 0621 72.7 kg (160 lb 3.2 oz)   11/05/24 0600 68.9 kg (151 lb 14.4 oz)   11/04/24 1402 67 kg (147 lb 11.3 oz)   11/04/24 0410 67.9 kg (149 lb 11.1 oz)   11/01/24 0159 74.3 kg (163 lb 12.8 oz)   10/27/24 0700 72.5 kg (159 lb 13.3 oz)   10/27/24 0346 75.7 kg (166 lb 14.2 oz)   10/09/24 1402 75.7 kg (166 lb 12.8 oz)   10/08/24 1455 75.3 kg (166 lb 0.1 oz)   10/02/24 0812 77.5 kg (170 lb 13.7 oz)   09/28/24 0330 77.2 kg (170 lb 3.1 oz)   09/27/24 1659 76.2 kg (167 lb 15.9 oz)   09/26/24 0823 78.9 kg (174 lb)   09/16/24 1814 79.3 kg (174 lb 13.2 oz)   09/12/24 1344 77.1 kg (169 lb 15.6 oz)   08/30/24 0927 75.6 kg (166 lb 10.7 oz)   08/14/24 0922 76.7 kg (169 lb)   07/17/24 1116 77.2 kg (170 lb 3.2 oz)          Wt Change Observation I/O's reveal -5.3 L since admission (~12 #s)     Estimated/Assessed Needs  Estimated Needs based on: Current Body Weight 62.5 kg       Energy Requirements 25-30 kcal/kg   EST Needs (kcal/day) 9992-0125        Protein Requirements 1.2-1.5 g.kg   EST Daily Needs (g/day) 75-94       Fluid Requirements 1mL/kcal    Estimated Needs (mL/day) 1012-0097     Labs/Medications Phos low, receiving K phos repletion.         Pertinent Labs Reviewed.   Results from last 7 days   Lab Units 12/19/24  0449 12/18/24  1453 12/17/24  0554 12/16/24  0452 12/15/24  0445 12/14/24  0428 12/13/24  0319   SODIUM mmol/L 129* 127* 129*   < > 134*   < > 140   POTASSIUM mmol/L 4.1 4.1 4.2   < > 4.2   < > 4.1   CHLORIDE mmol/L 90* 93* 93*   < >  94*   < > 102   CO2 mmol/L 28.7 24.3 26.2   < > 21.0*   < > 24.9   BUN mg/dL 41* 72* 47*   < > 65*   < > 66*   CREATININE mg/dL 3.11* 5.02* 3.63*   < > 4.90*   < > 5.19*   CALCIUM mg/dL 9.4 8.9 9.4   < > 9.7   < > 10.4   BILIRUBIN mg/dL  --   --   --   --  0.9  --  1.0   ALK PHOS U/L  --   --   --   --  84  --  93   ALT (SGPT) U/L  --   --   --   --  17  --  20   AST (SGOT) U/L  --   --   --   --  31  --  36   GLUCOSE mg/dL 138* 151* 159*   < > 178*   < > 266*    < > = values in this interval not displayed.     Results from last 7 days   Lab Units 12/19/24  0449 12/18/24  1453 12/17/24  0554   MAGNESIUM mg/dL 2.1 2.2 2.2   PHOSPHORUS mg/dL 3.8 6.1* 4.3   HEMOGLOBIN g/dL 8.8* 8.3* 9.3*   HEMATOCRIT % 29.2* 27.5* 31.3*     COVID19   Date Value Ref Range Status   10/08/2024 Not Detected Not Detected - Ref. Range Final     Lab Results   Component Value Date    HGBA1C 5.90 (H) 11/09/2024         Pertinent Medications Reviewed.     Malnutrition Severity Assessment              Nutrition Diagnosis         Nutrition Dx Problem 1 Inadequate oral Intake related to Inability to consume sufficient energy as evidenced by  need for EN     Nutrition Intervention           Current Nutrition Orders & Evaluation of Intake       Current PO Diet Diet: Diabetic; Consistent Carbohydrate; No Straw; Texture: Pureed (NDD 1); Fluid Consistency: Nectar Thick   Supplement Orders Placed This Encounter      Dietary Nutrition Supplements ProSource No Carb; Neutral      Feeding Tube Insertion - Cortrak System      Tube Feeding: Formula: Novasource Renal; Feeding Type: Continuous; Start at: 20 mL/hr; Then Advance By: 10 mL/hr; Every: 8 hours; To Goal Rate of: 40 mL/hr; Water Flush: 20 mL; Every: 4 hours; Water Bolus: None           Nutrition Intervention/Prescription        Continue Novasource Renal @ 40 mL/hr, flush 65 mL q4h  (Provides: 1760 kcal, 80 g pro, 1015 mL (625 mL FW + 390 mL flush)    2.   Continue Prosource BID  (provides 120 kcal, 30  g pro/day)  3.   Continue Diabetic, Puree diet as tolerated        Medical Nutrition Therapy/Nutrition Education          Learner     Readiness N/A  N/A     Method     Response N/A  N/A     Monitor/Evaluation        Monitor PO intake, Pertinent labs, EN delivery/tolerance, GI status, Hemodynamic stability     Nutrition Discharge Plan         To be determined     Electronically signed by:  Stephie Garsia RD  12/19/24 10:51 EST

## 2024-12-19 NOTE — PLAN OF CARE
Goal Outcome Evaluation:  Plan of Care Reviewed With: patient        Progress: improving  Outcome Evaluation: Remains on 1.5L, more alert today, restless in afternoon, sitter initiated, no c/o pain, increased oral intake, up to side of bed, up to chair/wheelchair throughout day.

## 2024-12-19 NOTE — THERAPY TREATMENT NOTE
Acute Care - Speech Language Pathology   Swallow Treatment Note BRIA Sullivan     Patient Name: Nelson Wang  : 1945  MRN: [id number]  Today's Date: 2024               Admit Date: 2024    Visit Dx:     ICD-10-CM ICD-9-CM   1. Acute respiratory failure with hypoxia  J96.01 518.81   2. Acute pulmonary edema  J81.0 518.4   3. Difficulty walking  R26.2 719.7   4. Oropharyngeal dysphagia  R13.12 787.22     Patient Active Problem List   Diagnosis    Essential hypertension    Bradycardia, sinus    Anemia due to chronic kidney disease    Hypothyroidism    Idiopathic gout    Proteinuria    Psoriasis    Thrombocytopenia    Type 2 diabetes mellitus without complication    CKD (chronic kidney disease), stage IV    Hyperlipidemia    Monoclonal gammopathy of unknown significance (MGUS)    Stage 5 chronic kidney disease    Chronic gout without tophus    Peritoneal dialysis catheter dysfunction    Medicare annual wellness visit, subsequent    Chronic cough    Peritonitis associated with peritoneal dialysis    Recurrent pneumonia    Acute on chronic respiratory failure with hypoxia    End stage renal disease    Aspiration pneumonitis    Sepsis    Type 2 diabetes mellitus, with long-term current use of insulin    GERD without esophagitis    Immunosuppression due to drug therapy    Bipolar disorder    Chronic respiratory failure with hypoxia    Shortness of breath    Abnormal stress test    ESRD on dialysis    Abnormal chest CT    Encounter for screening for malignant neoplasm of colon    History of colon polyps    Family history of colon cancer    Intractable pain    Abdominal pain    ESRD (end stage renal disease) on dialysis    AMS (altered mental status)    Hallucinations    LUQ pain    Discitis    Metabolic encephalopathy    Aspiration pneumonia    Discitis of lumbar region    Severe malnutrition    Dementia    Unspecified severe protein-calorie malnutrition    Hypoxia     Past Medical History:   Diagnosis Date     Abnormal stress test     Anemia     IRON INFUSIONS WITH DIALYSIS    Anesthesia     WITH HIP REPLACEMENT DAUGHTER BELIEVES HAD SVT 2000    Ankle pain, right     Anxiety and depression     Arthritis     CKD (chronic kidney disease), stage IV     DIALYSIS [id number] SHILEY IN RIGHT CHEST    Diabetes     GERD (gastroesophageal reflux disease)     Gout     High cholesterol     History of peritoneal dialysis     HL (hearing loss)     Hyperkalemia     Hypertension     Hypothyroidism     Insomnia     Night terrors     Psoriasis     Psoriasis     Sleep apnea     Sleep walking     Thrombocytopenia     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    Vitiligo      Past Surgical History:   Procedure Laterality Date    ABDOMINAL SURGERY      ANKLE SURGERY Right 11/1990    APPENDECTOMY N/A 1954    ARTERIOVENOUS FISTULA/SHUNT SURGERY Left 07/16/2024    Procedure: Creation of left arm arteriovenous fistula;  Surgeon: Moses Mir MD;  Location: [facility] MAIN OR;  Service: Vascular;  Laterality: Left;    BRONCHOSCOPY N/A 03/01/2024    Procedure: BRONCHOSCOPY WITH BAL AND WASHINGS;  Surgeon: Sandra Espinal MD;  Location: [facility] ENDOSCOPY;  Service: Pulmonary;  Laterality: N/A;  PNEUMONIA    BRONCHOSCOPY N/A 08/30/2024    Procedure: BRONCHOSCOPY WITH BALS AND WASHINGS;  Surgeon: Sandra Espinal MD;  Location: [facility] ENDOSCOPY;  Service: Pulmonary;  Laterality: N/A;  MUCOUS PLUGGING    CARDIAC CATHETERIZATION N/A 05/29/2024    Procedure: Left Heart Cath with possible coronary angioplasty;  Surgeon: Stephan Nichols MD;  Location: [facility] CATH INVASIVE LOCATION;  Service: Cardiology;  Laterality: N/A;    COLONOSCOPY N/A 06/2022    [facility]    ENDOSCOPY N/A 10/28/2024    Procedure: ESOPHAGOGASTRODUODENOSCOPY INSERTION OF LIGHTED INSTRUMENT TO VIEW ESOPHAGUS, STOMACH AND SMALL INTESTINE;  Surgeon: Kingsley Peraza MD;  Location: [facility] ENDOSCOPY;  Service: Gastroenterology;  Laterality: N/A;  Gastritis    FRACTURE SURGERY       HIP BIPOLAR REPLACEMENT Right 01/2000    INSERTION HEMODIALYSIS CATHETER Left 04/09/2024    Procedure: HEMODIALYSIS CATHETER INSERTION;  Surgeon: Jose Berry MD;  [facility];  Service: General;  Laterality: Left;    INSERTION HEMODIALYSIS CATHETER N/A 04/12/2024    Procedure: Tunneled hemodialysis catheter insertion;  Surgeon: Enrique Vinson MD;  [facility] MA[facility];  Service: Vascular;  Laterality: N/A;    INSERTION PERITONEAL DIALYSIS CATHETER N/A 03/27/2023    Procedure: LAPAROSCOPIC INSERTION PERITONEAL DIALYSIS CATHETER, LAPAROSCOPIC OMENTOPEXY WITH LYSIS OF ADHESIONS;  Surgeon: Jose Berry MD;  [facility] MA[facility];  Service: General;  Laterality: N/A;    INSERTION PERITONEAL DIALYSIS CATHETER Left 07/23/2023    Procedure: REVISION OF PERITONEAL DIALYSIS CATHETER;  Surgeon: Radha Oreilly MD;  [facility];  Service: General;  Laterality: Left;    JOINT REPLACEMENT      REMOVAL PERITONEAL DIALYSIS CATHETER N/A 04/09/2024    Procedure: REMOVAL PERITONEAL DIALYSIS CATHETER;  Surgeon: Jose Berry MD;  Location: [facility] MAIN OR;  Service: General;  Laterality: N/A;    RENAL BIOPSY Left 07/15/2022    UPPER GASTROINTESTINAL ENDOSCOPY      VASCULAR SURGERY      WRIST SURGERY      UNSURE WHICH SIDE DAUGHTER REPORTS HAD SEVERE  CUT FROM WINDOW AND THEY HAD TO DO RECONSTRUCTIVE SURGERY WITH VESSELS AND NERVES       SPEECH PATHOLOGY DYSPHAGIA TREATMENT     Subjective/Behavioral Observations: Alert and cooperative, attention to task varies.        Day/time of Treatment: 12/19/2024: PM session        Current Diet: Purée, nectar thick.  Patient with core track.        Current Strategies:One-on-one assist to feed self only when patient is fully awake, alert, and participating.  Alternate small bites and small sips of solids and liquids at a slow rate.  May utilize controlled straw drink with nectar liquid.  Check swallow each bite/sip.          Treatment received: Dysphagia therapy to address swallow function through exercises and education of strategies.        Results of treatment:  Patient required moderate-max cueing throughout meal.  Patient taking sips of nectar liquid by straw with minimal assistance, vocal quality remaining clear.  Patient requiring cueing at times to complete swallow solids.  Patient self distracting.  Meal discontinued.  Wife was present.        Progress toward goals: Adequate        Barriers to Achieving goals: Medical status        Plan of care:/changes in plan: Continue per current plan.  Continue with diet of purée solids and nectar thickened liquid.  Positioning fully upright for all p.o. intake and 30 minutes following.  One-to-one assist to feed, alternate small bites and small sips at a slow rate.  Check swallow each bite/sip.                                                                                             EDUCATION  The patient has been educated in the following areas:   Modified Diet Instruction.                Time Calculation:    Time Calculation- SLP       Row Name 12/19/24 1324 12/19/24 1021          Time Calculation- SLP    SLP Stop Time 1230  -TB 0930  -TB     SLP Received On 12/19/24  -TB 12/19/24  -TB        Untimed Charges    [id number] Treatment Swallow Minutes 40  -TB 40  -TB        Total Minutes    Untimed Charges Total Minutes 40  -TB 40  -TB      Total Minutes 40  -TB 40  -TB               User Key  (r) = Recorded By, (t) = Taken By, (c) = Cosigned By      Initials Name Provider Type    TB Katie Isaacs SLP Speech and Language Pathologist                    Therapy Charges for Today       Code Description Service Date Service Provider Modifiers Qty    [id number] HC ST EVAL ORAL PHARYNG SWALLOW 4 12/18/2024 Katie Isacas SLP GN 1    [id number] HC ST TREATMENT SWALLOW 3 12/19/2024 Katie Isaacs SLP GN 1    [id number] HC ST TREATMENT SWALLOW 3 12/19/2024 Katie Isaacs SLP GN 1                  Katie Isaacs, SLP  12/19/2024

## 2024-12-19 NOTE — PROGRESS NOTES
[facility]   Hospitalist Progress Note    Date of admission: 12/5/2024  Patient Name: Nelson Wang  1945  Date: 12/19/2024      Subjective     Chief Complaint   Patient presents with    Shortness of Breath       Interval Followup: Mentation may be a little worse today, still variable depending on time of day, discussed with family at bedside.  Undergoing speech evaluation currently doing okay with adjusted diet but appetite minimal.  Denies soa  dififcult to get full ros.  Discussed case at length.      Objective     Vitals:   Temp:  [97.7 °F (36.5 °C)-98.6 °F (37 °C)] 98.1 °F (36.7 °C)  Heart Rate:  [81-97] 96  Resp:  [16-18] 16  BP: ()/(45-79) 111/57  Flow (L/min) (Oxygen Therapy):  [1.5] 1.5    Physical Exam  Awake tired but conversant, confused, wont always answer appropriately, asking if he's going to the main hospital at some point and had to reiterate that he was already in the hospital   NG in place  Faint lower lung crackles, no wheezing, on NC  RRR no lower extremity pitting edema  Abdomen soft nontender  Generalized muscle wasting and weakness    Result Review:  Vital signs, labs and recent relevant imaging reviewed.      CBC          12/17/2024    05:54 12/18/2024    14:53 12/19/2024    04:49   CBC   WBC 6.47  5.62  5.53    RBC 3.49  3.06  3.27    Hemoglobin 9.3  8.3  8.8    Hematocrit 31.3  27.5  29.2    MCV 89.7  89.9  89.3    MCH 26.6  27.1  26.9    MCHC 29.7  30.2  30.1    RDW 21.0  20.0  20.0    Platelets 149  153  169      CMP          12/17/2024    05:54 12/18/2024    14:53 12/19/2024    04:49   CMP   Glucose 159  151  138    BUN 47  72  41    Creatinine 3.63  5.02  3.11    EGFR 16.4  11.1  19.7    Sodium 129  127  129    Potassium 4.2  4.1  4.1    Chloride 93  93  90    Calcium 9.4  8.9  9.4    Albumin 2.7      BUN/Creatinine Ratio 12.9  14.3  13.2    Anion Gap 9.8  9.7  10.3          artificial tears    senna-docusate sodium **AND** polyethylene glycol **AND** [DISCONTINUED]  bisacodyl **AND** bisacodyl    dextrose    dextrose    Diclofenac Sodium    glucagon (human recombinant)    heparin (porcine)    heparin (porcine)    ipratropium-albuterol    Lidocaine    ondansetron    Polyvinyl Alcohol-Povidone PF    QUEtiapine    sodium chloride    sodium chloride    sodium chloride    trolamine salicylate    acetaminophen, 650 mg, Nasogastric, Q6H  arformoterol, 15 mcg, Nebulization, BID - RT  [Held by provider] atorvastatin, 40 mg, Oral, Daily  budesonide, 0.5 mg, Nebulization, BID - RT  famotidine, 20 mg, Nasogastric, Daily  insulin lispro, 2-7 Units, Subcutaneous, Q6H  levothyroxine, 175 mcg, Nasogastric, Q AM  melatonin, 2.5 mg, Nasogastric, Nightly  micafungin (MYCAMINE) IV, 100 mg, Intravenous, Q24H  midodrine, 10 mg, Nasogastric, Once per day on Monday Wednesday Friday  mineral oil-hydrophilic petrolatum, 1 Application, Topical, Daily  QUEtiapine, 12.5 mg, Nasogastric, Nightly  sodium chloride, 10 mL, Intravenous, Q12H        XR Chest 1 View    Result Date: 12/19/2024  No significant interval change is appreciated radiographically since the prior study from 12/18/2024 at 5:21 a.m.   Portions of this note were completed with a voice recognition program.  Electronically Signed ByCorie Hogan MD On:12/19/2024 5:49 AM      XR Chest 1 View    Result Date: 12/18/2024  Increased bilateral infiltrates are seen. The findings may represent worsening infectious multifocal pneumonia. Increased pulmonary edema cannot be excluded. Aspiration pneumonia cannot be excluded. Please see above comments for further detail.   Portions of this note were completed with a voice recognition program.  Electronically Signed By-Stan Hogan MD On:12/18/2024 6:07 AM      CT Head Without Contrast    Result Date: 12/13/2024  Impression: No acute intracranial process. Improving right posterior scalp hematoma. Electronically Signed: Margaret Hammond MD  12/13/2024 9:05 AM EST  Workstation ID: [id number]    XR Chest 1  View    Result Date: 12/13/2024  Impression: Removed feeding tube, otherwise no significant radiographic change since 12/11/2024. Electronically Signed: Lorne Bustamante MD  12/13/2024 8:06 AM EST  Workstation ID: [id number]     Assessment / Plan     Summary: 79 y.o. ESRD on dialysis, type 2 diabetes, dementia, discitis on vancomycin, A-fib, restrictive lung disease who presents to the emergency department for evaluation of hypoxia and shortness of breath. Patient was started on supplemental oxygen to maintain his oxygen saturations. He was given a dose of Bumex overnight. His chest x-ray was consistent with bilateral pleural effusions. Pulmonology was consulted for the bilateral pleural effusions and and nephrology consulted for possible volume overload. Pulmonology was performing a thoracentesis this morning. Thoracentesis was showing bloody fluid. Patient suffered CODE BLUE. Likely bleeding into his airways causing hypoxic arrest. He received CPR for 6 minutes and achieved ROSC. He was transferred to the ICU, intubated and on blood pressure support. Bronchoscopy was showing blood clots in his airways. Patient was started on CRRT. Patient's medical status is tenuous. He is currently on 1 pressor. CRRT is removing fluid at a slow rate. Patient started on micafungin on 12/7/2024. Patient extubated on 12/8/2024. Patient on high flow nasal cannula 30 L 25%. Slowly weaning down on supplemental oxygen, breathing more comfortably. Poor mental status still waxing and waning. Patient has been transitioned over to hemodialysis, blood pressure tolerating.     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  Atrial fibrillation on Eliquis  ESRD on home HD   Type 2 diabetes  Mild nonobstructive CAD per 5/29/2024 cath  Question underlying dementia     Repeat cxr personally reviewed still with infiltrates noted similar to previous 1, only was able to have about 750 cc removed during hemodialysis, suspect still volume overloaded, is otherwise afebrile normal white count with similar O2 requirements deferring antibiotics will cut back free water amount and tube feeds, still has component of hypervolemic hyponatremia  Discussed with pulmonology deferring additional antibiotics currently rate with volume overload likely source of chest x-ray findings  discussed with ID previously, has completed IV vancomycin course for staph epi discitis, apparently Dr. Sigala (ID in [place]) saw him and 6 week course would be completed 12/14   Cont iv micafungin for 14d course, from 12/5 positive culture repeat from central and arterial line from 12/7 negative   Continue respiratory hygiene, airway clearance, nebulizers/BPH, appreciate pulmonology assistance, wean as able  Continue dialysis as per nephrology appreciate assistance, has been on RONALD outpatient, no bleeding noted monitor hemoglobin   Monitor daily weights  Cont midodrine on HD days and monitor bp closely, has limited HD previously, hopefully can complete entire course today  Seroquel dose prn prior to HD to hopefully help him tolerate given prior agitation with this.    Cont seroquel at night 12.5mg  Home sertraline 100 has not been resumed, given sedation/mentation issues still favor avoiding resumption at this time and monitor tolerance of seroquel.  Continuing delirium precautions, lights on during the day  EEG from prior hospitalization 11/15 and 12/10 without seizure activity noted on limited study.  Keppra has been dc'd given concern of contribution to  mentation.  Will continue to monitor without this.    12/1 ct head had a small hematoma, no acute abnormality, has generalized parenchymal volume loss noted, suspicious for underlying component of dementia along with uremia/hospital delirium contributing to current issues with mentation   Continue tube feeds, adjusting free water based on sodium, aspiration precautions, SLP monitoring,   Remains on modified diet, level of safety for diet is variable based on mentation as well and also concerns about total intake if we are to stop tube feeds, continuing for now if able to consistently take p.o. intake will try having tube feed rate to see if this helps increase his appetite but for now keep tube feeds assistance with meals as tolerated  Appreciate speech therapy assistance, discussed at bedside  Continue with scheduled Tylenol course to try and minimize any sedating pain medications.  Lidocaine patch as needed, Voltaren.  Continue Synthroid  Continue SSI and monitor blood glucose  Continue famotidine  Continue PT/OT  Continue supportive care discussions with patient and family  Check a.m. CBC, BMP, magnesium, phosphorus  Continue hospitalization at current level of care    Dispo: Ultimately goal for going home with family support once medically stable.   D/w RENETTA    VTE Prophylaxis:  Pharmacologic & mechanical VTE prophylaxis orders are present.      Level Of Support Discussed With: Next of Kin (If No Surrogate)  Code Status (Patient has no pulse and is not breathing): No CPR (Do Not Attempt to Resuscitate)  Medical Interventions (Patient has pulse or is breathing): Full Support    Greater than 50 minutes on this encounter including review of labs, imaging, documentation, orders, vitals, monitoring and adjusting treatment and orders as indicated, discussion with the patient, slp, pulm, family, rn sw, and documenting.

## 2024-12-20 ENCOUNTER — APPOINTMENT (OUTPATIENT)
Dept: GENERAL RADIOLOGY | Facility: HOSPITAL | Age: 79
End: 2024-12-20
Payer: MEDICARE

## 2024-12-20 LAB
ANION GAP SERPL CALCULATED.3IONS-SCNC: 13.6 MMOL/L (ref 5–15)
BASOPHILS # BLD AUTO: 0.03 10*3/MM3 (ref 0–0.2)
BASOPHILS NFR BLD AUTO: 0.5 % (ref 0–1.5)
BUN SERPL-MCNC: 68 MG/DL (ref 8–23)
BUN/CREAT SERPL: 15 (ref 7–25)
CALCIUM SPEC-SCNC: 9.7 MG/DL (ref 8.6–10.5)
CHLORIDE SERPL-SCNC: 90 MMOL/L (ref 98–107)
CO2 SERPL-SCNC: 26.4 MMOL/L (ref 22–29)
CREAT SERPL-MCNC: 4.52 MG/DL (ref 0.76–1.27)
DEPRECATED RDW RBC AUTO: 62.4 FL (ref 37–54)
EGFRCR SERPLBLD CKD-EPI 2021: 12.6 ML/MIN/1.73
EOSINOPHIL # BLD AUTO: 0.39 10*3/MM3 (ref 0–0.4)
EOSINOPHIL NFR BLD AUTO: 7 % (ref 0.3–6.2)
ERYTHROCYTE [DISTWIDTH] IN BLOOD BY AUTOMATED COUNT: 20.3 % (ref 12.3–15.4)
GLUCOSE BLDC GLUCOMTR-MCNC: 109 MG/DL (ref 70–99)
GLUCOSE BLDC GLUCOMTR-MCNC: 112 MG/DL (ref 70–99)
GLUCOSE BLDC GLUCOMTR-MCNC: 161 MG/DL (ref 70–99)
GLUCOSE BLDC GLUCOMTR-MCNC: 189 MG/DL (ref 70–99)
GLUCOSE SERPL-MCNC: 146 MG/DL (ref 65–99)
HCT VFR BLD AUTO: 27.5 % (ref 37.5–51)
HGB BLD-MCNC: 8.4 G/DL (ref 13–17.7)
IMM GRANULOCYTES # BLD AUTO: 0.02 10*3/MM3 (ref 0–0.05)
IMM GRANULOCYTES NFR BLD AUTO: 0.4 % (ref 0–0.5)
LYMPHOCYTES # BLD AUTO: 1.24 10*3/MM3 (ref 0.7–3.1)
LYMPHOCYTES NFR BLD AUTO: 22.3 % (ref 19.6–45.3)
MAGNESIUM SERPL-MCNC: 2.1 MG/DL (ref 1.6–2.4)
MCH RBC QN AUTO: 26.9 PG (ref 26.6–33)
MCHC RBC AUTO-ENTMCNC: 30.5 G/DL (ref 31.5–35.7)
MCV RBC AUTO: 88.1 FL (ref 79–97)
MONOCYTES # BLD AUTO: 0.74 10*3/MM3 (ref 0.1–0.9)
MONOCYTES NFR BLD AUTO: 13.3 % (ref 5–12)
NEUTROPHILS NFR BLD AUTO: 3.14 10*3/MM3 (ref 1.7–7)
NEUTROPHILS NFR BLD AUTO: 56.5 % (ref 42.7–76)
NRBC BLD AUTO-RTO: 0 /100 WBC (ref 0–0.2)
PHOSPHATE SERPL-MCNC: 4.5 MG/DL (ref 2.5–4.5)
PLATELET # BLD AUTO: 184 10*3/MM3 (ref 140–450)
PMV BLD AUTO: 10 FL (ref 6–12)
POTASSIUM SERPL-SCNC: 4 MMOL/L (ref 3.5–5.2)
RBC # BLD AUTO: 3.12 10*6/MM3 (ref 4.14–5.8)
SODIUM SERPL-SCNC: 130 MMOL/L (ref 136–145)
WBC NRBC COR # BLD AUTO: 5.56 10*3/MM3 (ref 3.4–10.8)

## 2024-12-20 PROCEDURE — 82948 REAGENT STRIP/BLOOD GLUCOSE: CPT

## 2024-12-20 PROCEDURE — 99233 SBSQ HOSP IP/OBS HIGH 50: CPT | Performed by: INTERNAL MEDICINE

## 2024-12-20 PROCEDURE — 71045 X-RAY EXAM CHEST 1 VIEW: CPT

## 2024-12-20 PROCEDURE — 94799 UNLISTED PULMONARY SVC/PX: CPT

## 2024-12-20 PROCEDURE — 25010000002 LORAZEPAM PER 2 MG: Performed by: INTERNAL MEDICINE

## 2024-12-20 PROCEDURE — 83735 ASSAY OF MAGNESIUM: CPT | Performed by: INTERNAL MEDICINE

## 2024-12-20 PROCEDURE — 84100 ASSAY OF PHOSPHORUS: CPT | Performed by: INTERNAL MEDICINE

## 2024-12-20 PROCEDURE — 85025 COMPLETE CBC W/AUTO DIFF WBC: CPT | Performed by: STUDENT IN AN ORGANIZED HEALTH CARE EDUCATION/TRAINING PROGRAM

## 2024-12-20 PROCEDURE — 80048 BASIC METABOLIC PNL TOTAL CA: CPT | Performed by: INTERNAL MEDICINE

## 2024-12-20 PROCEDURE — 63710000001 INSULIN LISPRO (HUMAN) PER 5 UNITS: Performed by: NURSE PRACTITIONER

## 2024-12-20 RX ORDER — QUETIAPINE FUMARATE 25 MG/1
12.5 TABLET, FILM COATED ORAL DAILY PRN
Status: DISCONTINUED | OUTPATIENT
Start: 2024-12-20 | End: 2024-12-22

## 2024-12-20 RX ORDER — LORAZEPAM 2 MG/ML
0.25 INJECTION INTRAMUSCULAR ONCE
Status: COMPLETED | OUTPATIENT
Start: 2024-12-20 | End: 2024-12-20

## 2024-12-20 RX ORDER — QUETIAPINE FUMARATE 25 MG/1
25 TABLET, FILM COATED ORAL NIGHTLY
Status: DISCONTINUED | OUTPATIENT
Start: 2024-12-20 | End: 2024-12-22

## 2024-12-20 RX ORDER — LORAZEPAM 2 MG/ML
0.5 INJECTION INTRAMUSCULAR DAILY PRN
Status: DISCONTINUED | OUTPATIENT
Start: 2024-12-20 | End: 2024-12-24

## 2024-12-20 RX ORDER — HYDRALAZINE HYDROCHLORIDE 20 MG/ML
10 INJECTION INTRAMUSCULAR; INTRAVENOUS EVERY 6 HOURS PRN
Status: DISCONTINUED | OUTPATIENT
Start: 2024-12-20 | End: 2024-12-23

## 2024-12-20 RX ORDER — QUETIAPINE FUMARATE 25 MG/1
12.5 TABLET, FILM COATED ORAL ONCE
Status: COMPLETED | OUTPATIENT
Start: 2024-12-20 | End: 2024-12-20

## 2024-12-20 RX ADMIN — Medication 2.5 MG: at 20:53

## 2024-12-20 RX ADMIN — LORAZEPAM 0.25 MG: 2 INJECTION INTRAMUSCULAR; INTRAVENOUS at 09:18

## 2024-12-20 RX ADMIN — QUETIAPINE FUMARATE 25 MG: 25 TABLET ORAL at 20:53

## 2024-12-20 RX ADMIN — Medication 10 ML: at 08:15

## 2024-12-20 RX ADMIN — LEVOTHYROXINE SODIUM 175 MCG: 0.03 TABLET ORAL at 05:51

## 2024-12-20 RX ADMIN — ACETAMINOPHEN 650 MG: 325 TABLET ORAL at 20:52

## 2024-12-20 RX ADMIN — ACETAMINOPHEN 650 MG: 325 TABLET ORAL at 03:48

## 2024-12-20 RX ADMIN — BUDESONIDE 0.5 MG: 0.5 INHALANT ORAL at 19:57

## 2024-12-20 RX ADMIN — ACETAMINOPHEN 650 MG: 325 TABLET ORAL at 15:55

## 2024-12-20 RX ADMIN — QUETIAPINE FUMARATE 12.5 MG: 25 TABLET ORAL at 08:15

## 2024-12-20 RX ADMIN — INSULIN LISPRO 2 UNITS: 100 INJECTION, SOLUTION INTRAVENOUS; SUBCUTANEOUS at 23:04

## 2024-12-20 RX ADMIN — ARFORMOTEROL TARTRATE 15 MCG: 15 SOLUTION RESPIRATORY (INHALATION) at 19:56

## 2024-12-20 RX ADMIN — INSULIN LISPRO 2 UNITS: 100 INJECTION, SOLUTION INTRAVENOUS; SUBCUTANEOUS at 06:00

## 2024-12-20 NOTE — PROGRESS NOTES
12/19/24 1620   Music Therapy   Assessment Detail Evaluation not performed  (Verbal referral recieved from nursing staff for Pt. Upon evaluation, Pt states he does not like music. MT is not appropriate at this time. Consult for any further needs.)   Minutes of Assessment 2

## 2024-12-20 NOTE — SIGNIFICANT NOTE
12/20/24 0829   OTHER   Discipline occupational therapist   Rehab Time/Intention   Session Not Performed patient unavailable for treatment  (off unit: dialysis)

## 2024-12-20 NOTE — PROGRESS NOTES
[facility]     Nephrology Progress Note      Patient Name: Nelson Wang  : 1945  MRN: [id number]  Primary Care Physician:  Domingo Bravo MD  Date of admission: 2024    Subjective   Subjective     Interval History:  No new events.  Seems more alert, still altered.  No distress.  Pt somnolent, confused, trying to sit up in bed.  NG in place.      Objective   Objective     Vitals:   Temp:  [97.3 °F (36.3 °C)-97.6 °F (36.4 °C)] 97.6 °F (36.4 °C)  Heart Rate:  [] 110  Resp:  [16-18] 16  BP: ()/() 148/80  Flow (L/min) (Oxygen Therapy):  [1-1.5] 1.5  Physical Exam:   Constitutional: Altered but arousable.   Eyes: sclerae anicteric, no conjunctival injection   HENT: mucous membranes moist.  NG in place.   Neck: Supple, no thyromegaly, no lymphadenopathy, trachea midline, No JVD   Respiratory: Decreased to auscultation bilaterally, nonlabored respirations, some upper airway rhonchi.   Cardiovascular: RRR, no murmurs, rubs, or gallops.   Gastrointestinal: Positive bowel sounds, soft, nontender, nondistended   Musculoskeletal: No edema, no clubbing or cyanosis.  Left upper extremity AV fistula, patent.   Neurologic: Arousable, moving extremities, did not follow commands to me..  Inconsistent.   Skin: warm and dry, no rashes, areas of hypopigmentation.    Result Review    Result Reviewed:  I have personally reviewed the results from the time of this admission to 2024 15:02 EST and agree with these findings:  [x]  Laboratory  []  Microbiology  [x]  Radiology  []  EKG/Telemetry   []  Cardiology/Vascular   []  Pathology  [x]  Old records  []  Other:        Lab 24  0650 24  0444 24  0449 24  1453 24  0554 24  0452 12/15/24  0445 24  0428   SODIUM 130*  --  129* 127* 129* 129* 134* 136   POTASSIUM 4.0  --  4.1 4.1 4.2 4.3 4.2 3.7   CHLORIDE 90*  --  90* 93* 93* 92* 94* 98   CO2 26.4  --  28.7 24.3 26.2 18.7* 21.0* 25.1   BUN 68*  --  41* 72*  47* 88* 65* 43*   CREATININE 4.52*  --  3.11* 5.02* 3.63* 6.03* 4.90* 3.00*   GLUCOSE 146*  --  138* 151* 159* 216* 178* 242*   EGFR 12.6*  --  19.7* 11.1* 16.4* 8.9* 11.4* 20.6*   ANION GAP 13.6  --  10.3 9.7 9.8 18.3* 19.0* 12.9   MAGNESIUM 2.1  --  2.1 2.2 2.2 2.3 2.2 2.0   PHOSPHORUS  --  4.5 3.8 6.1* 4.3 5.8* 4.8* 4.3  4.3             Assessment & Plan   Assessment / Plan       Active Hospital Problems:  Active Hospital Problems    Diagnosis     **Hypoxia        Assessment and Plan:    - ESRD, was on home dialysis.  Left upper extremity AV fistula for access.  Electrolytes reviewed.  Dialysis m/w/f.  UF 1 kg as tolerated.  Discussed with dialysis staff.     - Volume overload,  better now.   Sodium on low side, reduced flushes to 20ml q4hrs.      - Aspiration pneumonia, and possibly recurrent aspiration, per pulmonary.      - Type 2 diabetes with complications.     - Hypotension blood pressure is acceptable now, on ProAmatine and blood pressure stable.     - Anemia of chronic kidney disease, hemoglobin 10.7 with goal greater than 10, on RONALD as outpatient.  Getting Epogen while here.     - Altered mentation.  Multifactorial.  May be better.    Will follow.

## 2024-12-20 NOTE — SIGNIFICANT NOTE
12/20/24 1200   Physical Therapy Time and Intention   Session Not Performed patient unavailable for treatment   Comment, Session Not Performed Pt. was off of the floor for dialysis this a.m. and was unavailable for PT Tx.

## 2024-12-20 NOTE — NURSING NOTE
Duration of Treatment 4.0 Hours   Access Site AVF   Dialyzer Revaclear    mL/min   Dialysate Temperature (C) 36   Use Crit-Line? No   BFR-As tolerated to a maximum of: 400 mL/min   Prime Dialyzer With NS to Priming Volume? Yes   Dialysate Solution Bath: K+ = 3 mEq, CA = 2.5mg   Bicarb 35 mEq   Na+ 140 mEq   Fluid Removal: 1L     Patient completed four hours of treatment, tolerated well.  1L removed.  VSS throughout.  Post tx report given to primary nurse, HIREN Gonzales.

## 2024-12-20 NOTE — PROGRESS NOTES
[facility]   Hospitalist Progress Note    Date of admission: 12/5/2024  Patient Name: Nelson Wang  1945  Date: 12/20/2024      Subjective     Chief Complaint   Patient presents with    Shortness of Breath       Interval Followup: Worse confusion and restlessness today, seen during dialysis, family at bedside, patient not able to answer appropriately at this time currently requiring restraints and had to give dose of IV Ativan in addition to his urine Seroquel prior to dialysis.    Objective     Vitals:   Temp:  [97.3 °F (36.3 °C)-97.6 °F (36.4 °C)] 97.6 °F (36.4 °C)  Heart Rate:  [] 110  Resp:  [16-18] 16  BP: ()/() 148/80  Flow (L/min) (Oxygen Therapy):  [1.5] 1.5    Physical Exam  Awake tired lethargic and more confused not able to answer questions appropriately today, occasionally pulling at restraints have to try to calm it down and redirect him is when he flexes his left arm because of the dialysis machine to alarm   NG in place  Faint lower lung crackles, no wheezing, on NC  Elevated rate regular rhythm no lower extremity pitting edema  Abdomen soft nontender  Generalized muscle wasting and weakness    Result Review:  Vital signs, labs and recent relevant imaging reviewed.      CBC          12/18/2024    14:53 12/19/2024    04:49 12/20/2024    06:50   CBC   WBC 5.62  5.53  5.56    RBC 3.06  3.27  3.12    Hemoglobin 8.3  8.8  8.4    Hematocrit 27.5  29.2  27.5    MCV 89.9  89.3  88.1    MCH 27.1  26.9  26.9    MCHC 30.2  30.1  30.5    RDW 20.0  20.0  20.3    Platelets 153  169  184      CMP          12/18/2024    14:53 12/19/2024    04:49 12/20/2024    06:50   CMP   Glucose 151  138  146    BUN 72  41  68    Creatinine 5.02  3.11  4.52    EGFR 11.1  19.7  12.6    Sodium 127  129  130    Potassium 4.1  4.1  4.0    Chloride 93  90  90    Calcium 8.9  9.4  9.7    BUN/Creatinine Ratio 14.3  13.2  15.0    Anion Gap 9.7  10.3  13.6          artificial tears    senna-docusate sodium  **AND** polyethylene glycol **AND** [DISCONTINUED] bisacodyl **AND** bisacodyl    dextrose    dextrose    Diclofenac Sodium    glucagon (human recombinant)    heparin (porcine)    heparin (porcine)    ipratropium-albuterol    Lidocaine    LORazepam    ondansetron    Polyvinyl Alcohol-Povidone PF    QUEtiapine    sodium chloride    sodium chloride    sodium chloride    trolamine salicylate    acetaminophen, 650 mg, Nasogastric, Q6H  arformoterol, 15 mcg, Nebulization, BID - RT  [Held by provider] atorvastatin, 40 mg, Oral, Daily  budesonide, 0.5 mg, Nebulization, BID - RT  famotidine, 20 mg, Nasogastric, Daily  insulin lispro, 2-7 Units, Subcutaneous, Q6H  levothyroxine, 175 mcg, Nasogastric, Q AM  melatonin, 2.5 mg, Nasogastric, Nightly  micafungin (MYCAMINE) IV, 100 mg, Intravenous, Q24H  midodrine, 10 mg, Nasogastric, Once per day on Monday Wednesday Friday  mineral oil-hydrophilic petrolatum, 1 Application, Topical, Daily  QUEtiapine, 12.5 mg, Nasogastric, Nightly  sodium chloride, 10 mL, Intravenous, Q12H        XR Chest 1 View    Result Date: 12/19/2024  No significant interval change is appreciated radiographically since the prior study from 12/18/2024 at 5:21 a.m.   Portions of this note were completed with a voice recognition program.  Electronically Signed ByCorie Hogan MD On:12/19/2024 5:49 AM      XR Chest 1 View    Result Date: 12/18/2024  Increased bilateral infiltrates are seen. The findings may represent worsening infectious multifocal pneumonia. Increased pulmonary edema cannot be excluded. Aspiration pneumonia cannot be excluded. Please see above comments for further detail.   Portions of this note were completed with a voice recognition program.  Electronically Signed By-Stan Hogan MD On:12/18/2024 6:07 AM      CT Head Without Contrast    Result Date: 12/13/2024  Impression: No acute intracranial process. Improving right posterior scalp hematoma. Electronically Signed: Margaret Hammond MD   12/13/2024 9:05 AM EST  Workstation ID: [id number]    XR Chest 1 View    Result Date: 12/13/2024  Impression: Removed feeding tube, otherwise no significant radiographic change since 12/11/2024. Electronically Signed: Lorne Bustamante MD  12/13/2024 8:06 AM EST  Workstation ID: [id number]     Assessment / Plan     Summary: 79 y.o. ESRD on dialysis, type 2 diabetes, dementia, discitis on vancomycin, A-fib, restrictive lung disease who presents to the emergency department for evaluation of hypoxia and shortness of breath. Patient was started on supplemental oxygen to maintain his oxygen saturations. He was given a dose of Bumex overnight. His chest x-ray was consistent with bilateral pleural effusions. Pulmonology was consulted for the bilateral pleural effusions and and nephrology consulted for possible volume overload. Pulmonology was performing a thoracentesis this morning. Thoracentesis was showing bloody fluid. Patient suffered CODE BLUE. Likely bleeding into his airways causing hypoxic arrest. He received CPR for 6 minutes and achieved ROSC. He was transferred to the ICU, intubated and on blood pressure support. Bronchoscopy was showing blood clots in his airways. Patient was started on CRRT. Patient's medical status is tenuous. He is currently on 1 pressor. CRRT is removing fluid at a slow rate. Patient started on micafungin on 12/7/2024. Patient extubated on 12/8/2024. Patient on high flow nasal cannula 30 L 25%. Slowly weaning down on supplemental oxygen, breathing more comfortably. Poor mental status still waxing and waning. Patient has been transitioned over to hemodialysis, blood pressure tolerating.     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 Vines consulted appreciate assistance.  Continue nightly Seroquel, additional dose prior to dialysis as needed unfortunately even with this still need additional Ativan to help.  Home sertraline on hold, defer additional changes to psychiatry appreciate assistance. Continuing delirium precautions, lights on during the day  We will try to space out labs give lab holiday tomorrow to try and minimize agitation, white count stable again afebrile oxygenation stable/still on fairly minimal requirement hopefully can continue wean off with further fluid removal with HD   Holding tube feeds during hemodialysis will resume later   Discussed with pulmonology deferring additional antibiotics currently, suspect pulmonary edema on cxr, unless fever or worsening status deferring additional abx acutely   discussed with ID previously, has completed IV vancomycin course for staph epi discitis, apparently Dr. Sigala (ID in [place]) saw him and 6 week course would be completed 12/14   Cont iv micafungin for 14d course, from 12/5 positive culture repeat from central and arterial line from 12/7 negative   Continue respiratory hygiene, airway clearance, nebulizers/BPH, appreciate pulmonology assistance,  wean as able  EEG from prior hospitalization 11/15 and 12/10 without seizure activity noted on limited study.  Keppra has been dc'd given concern of contribution to mentation.  Will continue to monitor without this.    12/1 ct head had a small hematoma, no acute abnormality, has generalized parenchymal volume loss noted, suspicious for underlying component of dementia along with uremia/hospital delirium contributing to current issues with mentation   Continue tube feeds, titrate free water prn, monitor sodium, aspiration precautions, SLP monitoring,   Remains on modified diet, level of safety for diet is variable based on mentation as well and also concerns about total intake if we are to stop tube feeds, continuing for now if able to consistently take p.o. intake will try having tube feed rate to see if this helps increase his appetite but for now keep tube feeds assistance with meals as tolerated  Appreciate speech therapy assistance  Continue with scheduled Tylenol course to try and minimize any sedating pain medications.  Lidocaine patch as needed, Voltaren.  Continue Synthroid  Continue SSI and monitor blood glucose  Continue famotidine  Continue PT/OT  Continue supportive care discussions with patient and family  Check a.m. CBC, BMP, magnesium, phosphorus  Continue hospitalization at current level of care    Dispo: Ultimately goal for going home with family support once medically stable.   D/w SW    VTE Prophylaxis:  Pharmacologic & mechanical VTE prophylaxis orders are present.      Level Of Support Discussed With: Next of Kin (If No Surrogate)  Code Status (Patient has no pulse and is not breathing): No CPR (Do Not Attempt to Resuscitate)  Medical Interventions (Patient has pulse or is breathing): Full Support

## 2024-12-20 NOTE — PLAN OF CARE
Goal Outcome Evaluation:  Plan of Care Reviewed With: patient           Outcome Evaluation: Patient VSS this shift and rested well for a portion of the shift, however this morning restless has increased. Patient remains confused at this time and sitter at bedside.

## 2024-12-20 NOTE — SIGNIFICANT NOTE
12/20/24 0853   OTHER   Discipline speech language pathologist   Rehab Time/Intention   Session Not Performed patient unavailable for treatment  (Off of floor)   Recommendations   SLP - Next Appointment 12/23/24

## 2024-12-20 NOTE — PLAN OF CARE
Problem: Adult Inpatient Plan of Care  Goal: Plan of Care Review  Outcome: Progressing  Goal: Patient-Specific Goal (Individualized)  Outcome: Progressing  Goal: Absence of Hospital-Acquired Illness or Injury  Outcome: Progressing  Intervention: Identify and Manage Fall Risk  Recent Flowsheet Documentation  Taken 12/20/2024 1400 by Janet Durán RN  Safety Promotion/Fall Prevention: safety round/check completed  Intervention: Prevent Skin Injury  Recent Flowsheet Documentation  Taken 12/20/2024 1400 by Janet Durán RN  Body Position: sitting up in bed  Goal: Optimal Comfort and Wellbeing  Outcome: Progressing  Goal: Readiness for Transition of Care  Outcome: Progressing     Problem: Fall Injury Risk  Goal: Absence of Fall and Fall-Related Injury  Outcome: Progressing  Intervention: Promote Injury-Free Environment  Recent Flowsheet Documentation  Taken 12/20/2024 1400 by Janet Durán RN  Safety Promotion/Fall Prevention: safety round/check completed     Problem: Comorbidity Management  Goal: Blood Glucose Level Within Target Range  Outcome: Progressing  Goal: Blood Pressure in Desired Range  Outcome: Progressing  Goal: Maintenance of Osteoarthritis Symptom Control  Outcome: Progressing  Intervention: Maintain Osteoarthritis Symptom Control  Recent Flowsheet Documentation  Taken 12/20/2024 1400 by Janet Durán RN  Activity Management: up in chair     Problem: Violence Risk or Actual  Goal: Anger and Impulse Control  Outcome: Progressing     Problem: Skin Injury Risk Increased  Goal: Skin Health and Integrity  Outcome: Progressing  Intervention: Optimize Skin Protection  Recent Flowsheet Documentation  Taken 12/20/2024 1400 by Janet Durán RN  Activity Management: up in chair     Problem: Gas Exchange Impaired  Goal: Optimal Gas Exchange  Outcome: Progressing     Problem: Hemodialysis  Goal: Safe, Effective Therapy Delivery  Outcome: Progressing  Goal: Effective Tissue Perfusion  Outcome:  Progressing  Goal: Absence of Infection Signs and Symptoms  Outcome: Progressing     Problem: Noninvasive Ventilation Acute  Goal: Effective Unassisted Ventilation and Oxygenation  Outcome: Progressing   Goal Outcome Evaluation:               Patient went to [facility] today and had one unit off. Blood glucose 109 this afternoon on return. Up to chair. Family in room.

## 2024-12-21 ENCOUNTER — APPOINTMENT (OUTPATIENT)
Dept: GENERAL RADIOLOGY | Facility: HOSPITAL | Age: 79
End: 2024-12-21
Payer: MEDICARE

## 2024-12-21 LAB
GLUCOSE BLDC GLUCOMTR-MCNC: 112 MG/DL (ref 70–99)
GLUCOSE BLDC GLUCOMTR-MCNC: 128 MG/DL (ref 70–99)
GLUCOSE BLDC GLUCOMTR-MCNC: 149 MG/DL (ref 70–99)
GLUCOSE BLDC GLUCOMTR-MCNC: 161 MG/DL (ref 70–99)

## 2024-12-21 PROCEDURE — 94799 UNLISTED PULMONARY SVC/PX: CPT

## 2024-12-21 PROCEDURE — 97530 THERAPEUTIC ACTIVITIES: CPT

## 2024-12-21 PROCEDURE — 74018 RADEX ABDOMEN 1 VIEW: CPT

## 2024-12-21 PROCEDURE — 99232 SBSQ HOSP IP/OBS MODERATE 35: CPT | Performed by: INTERNAL MEDICINE

## 2024-12-21 PROCEDURE — 82948 REAGENT STRIP/BLOOD GLUCOSE: CPT

## 2024-12-21 PROCEDURE — 99232 SBSQ HOSP IP/OBS MODERATE 35: CPT | Performed by: STUDENT IN AN ORGANIZED HEALTH CARE EDUCATION/TRAINING PROGRAM

## 2024-12-21 PROCEDURE — 63710000001 INSULIN LISPRO (HUMAN) PER 5 UNITS: Performed by: NURSE PRACTITIONER

## 2024-12-21 PROCEDURE — 94664 DEMO&/EVAL PT USE INHALER: CPT

## 2024-12-21 PROCEDURE — 25010000002 MICAFUNGIN SODIUM 100 MG RECONSTITUTED SOLUTION 1 EACH VIAL: Performed by: INTERNAL MEDICINE

## 2024-12-21 RX ORDER — ACETAMINOPHEN 325 MG/1
650 TABLET ORAL EVERY 6 HOURS PRN
Status: DISCONTINUED | OUTPATIENT
Start: 2024-12-21 | End: 2024-12-22

## 2024-12-21 RX ADMIN — INSULIN LISPRO 2 UNITS: 100 INJECTION, SOLUTION INTRAVENOUS; SUBCUTANEOUS at 05:08

## 2024-12-21 RX ADMIN — WHITE PETROLATUM 1 APPLICATION: 1.75 OINTMENT TOPICAL at 08:27

## 2024-12-21 RX ADMIN — Medication 10 ML: at 20:24

## 2024-12-21 RX ADMIN — LEVOTHYROXINE SODIUM 175 MCG: 0.03 TABLET ORAL at 05:08

## 2024-12-21 RX ADMIN — MICAFUNGIN SODIUM 100 MG: 100 INJECTION, POWDER, LYOPHILIZED, FOR SOLUTION INTRAVENOUS at 08:31

## 2024-12-21 RX ADMIN — ACETAMINOPHEN 650 MG: 325 TABLET ORAL at 08:26

## 2024-12-21 RX ADMIN — ACETAMINOPHEN 650 MG: 325 TABLET ORAL at 20:24

## 2024-12-21 RX ADMIN — FAMOTIDINE 20 MG: 20 TABLET, FILM COATED ORAL at 08:26

## 2024-12-21 RX ADMIN — QUETIAPINE FUMARATE 25 MG: 25 TABLET ORAL at 20:24

## 2024-12-21 RX ADMIN — BUDESONIDE 0.5 MG: 0.5 INHALANT ORAL at 19:19

## 2024-12-21 RX ADMIN — ARFORMOTEROL TARTRATE 15 MCG: 15 SOLUTION RESPIRATORY (INHALATION) at 19:19

## 2024-12-21 RX ADMIN — ACETAMINOPHEN 650 MG: 325 TABLET ORAL at 02:24

## 2024-12-21 RX ADMIN — BUDESONIDE 0.5 MG: 0.5 INHALANT ORAL at 06:44

## 2024-12-21 RX ADMIN — Medication 2.5 MG: at 20:24

## 2024-12-21 RX ADMIN — Medication 10 ML: at 08:29

## 2024-12-21 RX ADMIN — ARFORMOTEROL TARTRATE 15 MCG: 15 SOLUTION RESPIRATORY (INHALATION) at 06:44

## 2024-12-21 NOTE — THERAPY TREATMENT NOTE
Acute Care - Physical Therapy Treatment Note  BRIA Sullivan     Patient Name: Nelson Wang  : 1945  MRN: [id number]  Today's Date: 2024      Visit Dx:     ICD-10-CM ICD-9-CM   1. Acute respiratory failure with hypoxia  J96.01 518.81   2. Acute pulmonary edema  J81.0 518.4   3. Difficulty walking  R26.2 719.7   4. Oropharyngeal dysphagia  R13.12 787.22     Patient Active Problem List   Diagnosis    Essential hypertension    Bradycardia, sinus    Anemia due to chronic kidney disease    Hypothyroidism    Idiopathic gout    Proteinuria    Psoriasis    Thrombocytopenia    Type 2 diabetes mellitus without complication    CKD (chronic kidney disease), stage IV    Hyperlipidemia    Monoclonal gammopathy of unknown significance (MGUS)    Stage 5 chronic kidney disease    Chronic gout without tophus    Peritoneal dialysis catheter dysfunction    Medicare annual wellness visit, subsequent    Chronic cough    Peritonitis associated with peritoneal dialysis    Recurrent pneumonia    Acute on chronic respiratory failure with hypoxia    End stage renal disease    Aspiration pneumonitis    Sepsis    Type 2 diabetes mellitus, with long-term current use of insulin    GERD without esophagitis    Immunosuppression due to drug therapy    Bipolar disorder    Chronic respiratory failure with hypoxia    Shortness of breath    Abnormal stress test    ESRD on dialysis    Abnormal chest CT    Encounter for screening for malignant neoplasm of colon    History of colon polyps    Family history of colon cancer    Intractable pain    Abdominal pain    ESRD (end stage renal disease) on dialysis    AMS (altered mental status)    Hallucinations    LUQ pain    Discitis    Metabolic encephalopathy    Aspiration pneumonia    Discitis of lumbar region    Severe malnutrition    Dementia    Unspecified severe protein-calorie malnutrition    Hypoxia     Past Medical History:   Diagnosis Date    Abnormal stress test     Anemia     IRON INFUSIONS  WITH DIALYSIS    Anesthesia     WITH HIP REPLACEMENT DAUGHTER BELIEVES HAD SVT 2000    Ankle pain, right     Anxiety and depression     Arthritis     CKD (chronic kidney disease), stage IV     DIALYSIS [id number] IN RIGHT CHEST    Diabetes     GERD (gastroesophageal reflux disease)     Gout     High cholesterol     History of peritoneal dialysis     HL (hearing loss)     Hyperkalemia     Hypertension     Hypothyroidism     Insomnia     Night terrors     Psoriasis     Psoriasis     Sleep apnea     Sleep walking     Thrombocytopenia     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    Vitiligo      Past Surgical History:   Procedure Laterality Date    ABDOMINAL SURGERY      ANKLE SURGERY Right 11/1990    APPENDECTOMY N/A 1954    ARTERIOVENOUS FISTULA/SHUNT SURGERY Left 07/16/2024    Procedure: Creation of left arm arteriovenous fistula;  Surgeon: Moses Mir MD;  Location: [facility] MAIN OR;  Service: Vascular;  Laterality: Left;    BRONCHOSCOPY N/A 03/01/2024    Procedure: BRONCHOSCOPY WITH BAL AND WASHINGS;  Surgeon: Sandra Espinal MD;  Location: [facility] ENDOSCOPY;  Service: Pulmonary;  Laterality: N/A;  PNEUMONIA    BRONCHOSCOPY N/A 08/30/2024    Procedure: BRONCHOSCOPY WITH BALS AND WASHINGS;  Surgeon: Sandra Espinal MD;  Location: [facility] ENDOSCOPY;  Service: Pulmonary;  Laterality: N/A;  MUCOUS PLUGGING    CARDIAC CATHETERIZATION N/A 05/29/2024    Procedure: Left Heart Cath with possible coronary angioplasty;  Surgeon: Stephan Nichols MD;  Location: [facility] CATH INVASIVE LOCATION;  Service: Cardiology;  Laterality: N/A;    COLONOSCOPY N/A 06/2022    [facility]    ENDOSCOPY N/A 10/28/2024    Procedure: ESOPHAGOGASTRODUODENOSCOPY INSERTION OF LIGHTED INSTRUMENT TO VIEW ESOPHAGUS, STOMACH AND SMALL INTESTINE;  Surgeon: Kingsley Peraza MD;  Location: [facility] ENDOSCOPY;  Service: Gastroenterology;  Laterality: N/A;  Gastritis    FRACTURE SURGERY      HIP BIPOLAR REPLACEMENT Right 01/2000     INSERTION HEMODIALYSIS CATHETER Left 04/09/2024    Procedure: HEMODIALYSIS CATHETER INSERTION;  Surgeon: Jose Berry MD;  Location: [facility] MAIN OR;  Service: General;  Laterality: Left;    INSERTION HEMODIALYSIS CATHETER N/A 04/12/2024    Procedure: Tunneled hemodialysis catheter insertion;  Surgeon: Enrique Vinson MD;  Location: [facility] MAIN OR;  Service: Vascular;  Laterality: N/A;    INSERTION PERITONEAL DIALYSIS CATHETER N/A 03/27/2023    Procedure: LAPAROSCOPIC INSERTION PERITONEAL DIALYSIS CATHETER, LAPAROSCOPIC OMENTOPEXY WITH LYSIS OF ADHESIONS;  Surgeon: Jose Berry MD;  Location: [facility] MAIN OR;  Service: General;  Laterality: N/A;    INSERTION PERITONEAL DIALYSIS CATHETER Left 07/23/2023    Procedure: REVISION OF PERITONEAL DIALYSIS CATHETER;  Surgeon: Radha Oreilly MD;  Location: [facility] MAIN OR;  Service: General;  Laterality: Left;    JOINT REPLACEMENT      REMOVAL PERITONEAL DIALYSIS CATHETER N/A 04/09/2024    Procedure: REMOVAL PERITONEAL DIALYSIS CATHETER;  Surgeon: Jose Berry MD;  Location: [facility] MAIN OR;  Service: General;  Laterality: N/A;    RENAL BIOPSY Left 07/15/2022    UPPER GASTROINTESTINAL ENDOSCOPY      VASCULAR SURGERY      WRIST SURGERY      UNSURE WHICH SIDE DAUGHTER REPORTS HAD SEVERE  CUT FROM WINDOW AND THEY HAD TO DO RECONSTRUCTIVE SURGERY WITH VESSELS AND NERVES     PT Assessment (Last 12 Hours)       PT Evaluation and Treatment       Row Name 12/21/24 0930          Physical Therapy Time and Intention    Subjective Information no complaints  -SM     Document Type therapy note (daily note)  -SM     Mode of Treatment individual therapy;physical therapy  -SM     Patient Effort fair  -SM     Symptoms Noted During/After Treatment none  -SM       Row Name 12/21/24 0930          Cognition    Affect/Mental Status (Cognition) low arousal/lethargic  -SM     Behavioral Issues (Cognition) withdrawn;uncooperative  -SM     Follows Commands  (Cognition) repetition of directions required;verbal cues/prompting required;physical/tactile prompts required;visual cue;increased processing time needed  -       Row Name 12/21/24 0930          Bed Mobility    All Activities, [place] (Bed Mobility) contact guard  -     Assistive Device (Bed Mobility) head of bed elevated  -       Row Name 12/21/24 0930          Sit-Stand Transfer    Sit-Stand [place] (Transfers) maximum assist (25% patient effort)  -     Assistive Device (Sit-Stand Transfers) walker, front-wheeled  -     Comment, (Sit-Stand Transfer) Pt agreeable to STSa dn bed to chair tf, however once standing pt did not advance towards chair, despite VC and physical assistance. Attempted 3x, deferred at this time.  -       Row Name 12/21/24 0930          Stand-Sit Transfer    Stand-Sit [place] (Transfers) maximum assist (25% patient effort)  -     Assistive Device (Stand-Sit Transfers) walker, front-wheeled  -       Row Name 12/21/24 0930          Safety Issues/Impairments Affecting Functional Mobility    Impairments Affecting Function (Mobility) balance;cognition;endurance/activity tolerance;strength  -     Cognitive Impairments, Mobility Safety/Performance attention;impulsivity  -       Row Name 12/21/24 0930          Balance    Static Sitting Balance contact guard  -     Static Standing Balance maximum assist  -     Dynamic Standing Balance dependent  -     Position/Device Used, Standing Balance walker, front-wheeled  -       Row Name             Wound 11/15/24 1945 Right upper chest    Wound - Properties Group Placement Date: 11/15/24  -JR Placement Time: 1945  -JR Side: Right  -JR Orientation: upper  -JR Location: chest  -JR Primary Wound Type: Incision  -JR    Retired Wound - Properties Group Placement Date: 11/15/24  -JR Placement Time: 1945  -JR Side: Right  -JR Orientation: upper  -JR Location: chest  -JR Primary Wound Type: Incision  -JR    Retired Wound -  Properties Group Placement Date: 11/15/24  -JR Placement Time: 1945  -JR Side: Right  -JR Orientation: upper  -JR Location: chest  -JR Primary Wound Type: Incision  -JR    Retired Wound - Properties Group Date first assessed: 11/15/24  -JR Time first assessed: 1945 -JR Side: Right  -JR Location: chest  -JR Primary Wound Type: Incision  -JR      Row Name             Wound 12/05/24 2331 Right lower leg abrasion    Wound - Properties Group Placement Date: 12/05/24  -AW Placement Time: 2331  -AW Side: Right  -AW Orientation: lower  -AW Location: leg  -AW Primary Wound Type: Abrasion  -AW Type: abrasion  -AW Present on Original Admission: Y  -AW    Retired Wound - Properties Group Placement Date: 12/05/24  -AW Placement Time: 2331  -AW Present on Original Admission: Y  -AW Side: Right  -AW Orientation: lower  -AW Location: leg  -AW Primary Wound Type: Abrasion  -AW Type: abrasion  -AW    Retired Wound - Properties Group Placement Date: 12/05/24  -AW Placement Time: 2331  -AW Present on Original Admission: Y  -AW Side: Right  -AW Orientation: lower  -AW Location: leg  -AW Primary Wound Type: Abrasion  -AW Type: abrasion  -AW    Retired Wound - Properties Group Date first assessed: 12/05/24  -AW Time first assessed: 2331  -AW Present on Original Admission: Y  -AW Side: Right  -AW Location: leg  -AW Primary Wound Type: Abrasion  -AW Type: abrasion  -AW      Row Name             Wound 12/06/24 1440 Left lower leg skin tear    Wound - Properties Group Placement Date: 12/06/24  -KE Placement Time: 1440  -KE Side: Left  -KE Orientation: lower  -KE Location: leg  -KE Primary Wound Type: Skin tear  -KE Type: skin tear  -KE    Retired Wound - Properties Group Placement Date: 12/06/24  -KE Placement Time: 1440  -KE Side: Left  -KE Orientation: lower  -KE Location: leg  -KE Primary Wound Type: Skin tear  -KE Type: skin tear  -KE    Retired Wound - Properties Group Placement Date: 12/06/24  -KE Placement Time: 1440  -KE Side: Left   -KE Orientation: lower  -KE Location: leg  -KE Primary Wound Type: Skin tear  -KE Type: skin tear  -KE    Retired Wound - Properties Group Date first assessed: 12/06/24  -KE Time first assessed: 1440  -KE Side: Left  -KE Location: leg  -KE Primary Wound Type: Skin tear  -KE Type: skin tear  -KE      Row Name 12/21/24 0930          Positioning and Restraints    Pre-Treatment Position in bed  -     Post Treatment Position bed  -SM     In Bed side lying left;call light within reach;encouraged to call for assist;exit alarm on;with other staff  With safety sitter  -       Row Name 12/21/24 0930          Progress Summary (PT)    Progress Toward Functional Goals (PT) progress toward functional goals is gradual  -               User Key  (r) = Recorded By, (t) = Taken By, (c) = Cosigned By      Initials Name Provider Type    Stan Esposito, RN Registered Nurse    Jackelin Freitas RN Registered Nurse    Neena Weaver RN Registered Nurse    Dana Adamson PTA Physical Therapist Assistant                    Physical Therapy Education       Title: PT OT SLP Therapies (In Progress)       Topic: Physical Therapy (In Progress)       Point: Mobility training (Done)       Learning Progress Summary            Patient Acceptance, E,TB, VU by  at 12/17/2024 1454                      Point: Home exercise program (Not Started)       Learner Progress:  Not documented in this visit.              Point: Body mechanics (Done)       Learning Progress Summary            Patient Acceptance, E,TB, VU by AV at 12/17/2024 1454                      Point: Precautions (Done)       Learning Progress Summary            Patient Acceptance, E,TB, VU by AV at 12/17/2024 1454                                      User Key       Initials Effective Dates Name Provider Type Discipline     06/11/21 -  Kaushik Chavez, PT Physical Therapist PT                  PT Recommendation and Plan     Progress Summary (PT)  Progress Toward Functional  Goals (PT): progress toward functional goals is gradual   Outcome Measures       Row Name 12/21/24 0935 12/19/24 1454 12/18/24 1105       How much help from another person do you currently need...    Turning from your back to your side while in flat bed without using bedrails? 4  -SM 3  -SM 2  -SM    Moving from lying on back to sitting on the side of a flat bed without bedrails? 4  -SM 2  -SM 2  -SM    Moving to and from a bed to a chair (including a wheelchair)? 1  -SM 3  -SM 2  -SM    Standing up from a chair using your arms (e.g., wheelchair, bedside chair)? 2  -SM 3  -SM 3  -SM    Climbing 3-5 steps with a railing? 1  -SM 1  -SM 2  -SM    To walk in hospital room? 1  -SM 1  -SM 2  -SM    AM-PAC 6 Clicks Score (PT) 13  -SM 13  -SM 13  -SM              User Key  (r) = Recorded By, (t) = Taken By, (c) = Cosigned By      Initials Name Provider Type    Dana Adamson PTA Physical Therapist Assistant                     Time Calculation:    PT Charges       Row Name 12/21/24 0929             Time Calculation    PT Received On 12/21/24  -SM         Timed Charges    48059 - PT Therapeutic Activity Minutes 25  -SM         Total Minutes    Timed Charges Total Minutes 25  -SM       Total Minutes 25  -SM                User Key  (r) = Recorded By, (t) = Taken By, (c) = Cosigned By      Initials Name Provider Type    Dana Adamson PTA Physical Therapist Assistant                      PT G-Codes  Outcome Measure Options: AM-PAC 6 Clicks Daily Activity (OT), Optimal Instrument  AM-PAC 6 Clicks Score (PT): 13  AM-PAC 6 Clicks Score (OT): 7    Dana Garcia PTA  12/21/2024

## 2024-12-21 NOTE — PROGRESS NOTES
[facility]     Nephrology Progress Note      Patient Name: Nelson Wang  : 1945  MRN: [id number]  Primary Care Physician:  Domingo Bravo MD  Date of admission: 2024    Subjective   Subjective     Interval History:  No new events.  Reportedly more alert earlier today.  Sat up and ate breakfast.  Now sleeping, somewhat restless.  I did not attempt to wake him up.  Wife and son-in-law in room.  Sitter also at bedside..    Dobbhoff tube in place.      Objective   Objective     Vitals:   Temp:  [97.3 °F (36.3 °C)-98.6 °F (37 °C)] 98.2 °F (36.8 °C)  Heart Rate:  [] 98  Resp:  [16-24] 18  BP: (122-175)/(43-84) 175/43  Flow (L/min) (Oxygen Therapy):  [2] 2  Physical Exam:   Constitutional: Sleeping, restless.   Eyes: sclerae anicteric, no conjunctival injection   HENT: mucous membranes moist.  NG in place.   Neck: Supple, no thyromegaly, no lymphadenopathy, trachea midline, No JVD   Respiratory: Decreased to auscultation bilaterally, nonlabored respirations, some upper airway rhonchi.   Cardiovascular: RRR, no murmurs, rubs, or gallops.   Gastrointestinal: Positive bowel sounds, soft, nontender, nondistended   Musculoskeletal: No edema, no clubbing or cyanosis.  Left upper extremity AV fistula, patent.   Neurologic: Arousable, moving extremities,   Skin: warm and dry, no rashes, areas of hypopigmentation.    Result Review    Result Reviewed:  I have personally reviewed the results from the time of this admission to 2024 13:24 EST and agree with these findings:  [x]  Laboratory  []  Microbiology  [x]  Radiology  []  EKG/Telemetry   []  Cardiology/Vascular   []  Pathology  [x]  Old records  []  Other:        Lab 24  0650 24  0444 24  0449 24  1453 24  0554 24  0452 12/15/24  0445   SODIUM 130*  --  129* 127* 129* 129* 134*   POTASSIUM 4.0  --  4.1 4.1 4.2 4.3 4.2   CHLORIDE 90*  --  90* 93* 93* 92* 94*   CO2 26.4  --  28.7 24.3 26.2 18.7* 21.0*   BUN 68*   --  41* 72* 47* 88* 65*   CREATININE 4.52*  --  3.11* 5.02* 3.63* 6.03* 4.90*   GLUCOSE 146*  --  138* 151* 159* 216* 178*   EGFR 12.6*  --  19.7* 11.1* 16.4* 8.9* 11.4*   ANION GAP 13.6  --  10.3 9.7 9.8 18.3* 19.0*   MAGNESIUM 2.1  --  2.1 2.2 2.2 2.3 2.2   PHOSPHORUS  --  4.5 3.8 6.1* 4.3 5.8* 4.8*             Assessment & Plan   Assessment / Plan       Active Hospital Problems:  Active Hospital Problems    Diagnosis     **Hypoxia        Assessment and Plan:    - ESRD, was on home dialysis.  Left upper extremity AV fistula for access.  Electrolytes reviewed.  Dialysis m/w/f.      - Volume overload,  better now.   Sodium on low side, reduced flushes to 20ml q4hrs.      - Aspiration pneumonia, and possibly recurrent aspiration, per pulmonary.      - Type 2 diabetes with complications.     - Hypotension blood pressure is acceptable now, on ProAmatine and blood pressure stable.     - Anemia of chronic kidney disease, hemoglobin 10.7 with goal greater than 10, on RONALD as outpatient.  Getting Epogen while here.     - Altered mentation.  Multifactorial.  Per primary.    Discussed with family.  Will follow.    Electronically signed by Coreen Castro MD, 12/21/24, 1:26 PM EST.

## 2024-12-21 NOTE — PLAN OF CARE
Goal Outcome Evaluation:              Outcome Evaluation: transfer this shift. aox1, does not always follow commands. increased PO intake, ate most of all meals. cortrak pulled per MD order. frequently turns and repositions self in bed. safety sitter continues at bedside due to restlessness and impulsiveness. no s/s or c/o pain. fall risk measures in place. BG monitored. wound dressings c/d/i. remains on 2L nc.

## 2024-12-21 NOTE — PROGRESS NOTES
[facility]   Hospitalist Progress Note    Date of admission: 12/5/2024  Patient Name: Nelson Wang  1945  Date: 12/21/2024      Subjective     Chief Complaint   Patient presents with    Shortness of Breath       Interval Followup: eating adjusted diet ok, mentation still variable, tired apparently just got to sleep recently family requesting let him rest for now        Objective     Vitals:   Temp:  [97.3 °F (36.3 °C)-98.6 °F (37 °C)] 97.7 °F (36.5 °C)  Heart Rate:  [] 98  Resp:  [16-24] 16  BP: (122-175)/(43-84) 126/77  Flow (L/min) (Oxygen Therapy):  [2] 2    Physical Exam  Asleep in bed, thin, frail,   NG in place  B/l lower lobe faint ronchi, no wheezing, on NC   Mildly elevated rate regular rhythm no lower extremity pitting edema  Abdomen nondistended  Generalized muscle wasting     Result Review:  Vital signs, labs and recent relevant imaging reviewed.      CBC          12/18/2024    14:53 12/19/2024    04:49 12/20/2024    06:50   CBC   WBC 5.62  5.53  5.56    RBC 3.06  3.27  3.12    Hemoglobin 8.3  8.8  8.4    Hematocrit 27.5  29.2  27.5    MCV 89.9  89.3  88.1    MCH 27.1  26.9  26.9    MCHC 30.2  30.1  30.5    RDW 20.0  20.0  20.3    Platelets 153  169  184      CMP          12/18/2024    14:53 12/19/2024    04:49 12/20/2024    06:50   CMP   Glucose 151  138  146    BUN 72  41  68    Creatinine 5.02  3.11  4.52    EGFR 11.1  19.7  12.6    Sodium 127  129  130    Potassium 4.1  4.1  4.0    Chloride 93  90  90    Calcium 8.9  9.4  9.7    BUN/Creatinine Ratio 14.3  13.2  15.0    Anion Gap 9.7  10.3  13.6          artificial tears    senna-docusate sodium **AND** polyethylene glycol **AND** [DISCONTINUED] bisacodyl **AND** bisacodyl    dextrose    dextrose    Diclofenac Sodium    glucagon (human recombinant)    heparin (porcine)    heparin (porcine)    hydrALAZINE    ipratropium-albuterol    Lidocaine    LORazepam    ondansetron    Polyvinyl Alcohol-Povidone PF    QUEtiapine    sodium  chloride    sodium chloride    sodium chloride    trolamine salicylate    acetaminophen, 650 mg, Nasogastric, Q6H  arformoterol, 15 mcg, Nebulization, BID - RT  [Held by provider] atorvastatin, 40 mg, Oral, Daily  budesonide, 0.5 mg, Nebulization, BID - RT  famotidine, 20 mg, Nasogastric, Daily  insulin lispro, 2-7 Units, Subcutaneous, Q6H  levothyroxine, 175 mcg, Nasogastric, Q AM  melatonin, 2.5 mg, Nasogastric, Nightly  micafungin (MYCAMINE) IV, 100 mg, Intravenous, Q24H  midodrine, 10 mg, Nasogastric, Once per day on Monday Wednesday Friday  mineral oil-hydrophilic petrolatum, 1 Application, Topical, Daily  QUEtiapine, 25 mg, Nasogastric, Nightly  sodium chloride, 10 mL, Intravenous, Q12H        XR Abdomen KUB    Result Date: 12/21/2024  Impression: 1.Enteric tube terminates in the left upper quadrant, with tip likely in the mid stomach. 2.Calcification at the right renal pelvis, as before. Electronically Signed: Enrique Villavicencio  12/21/2024 12:22 PM EST  Workstation ID: [id number]    XR Chest 1 View    Result Date: 12/20/2024  Feeding tube extends well below the diaphragm. The tip is not seen. Electronically Signed: Cristian Tiwari MD  12/20/2024 10:38 PM EST  Workstation ID: [id number]    XR Chest 1 View    Result Date: 12/19/2024  No significant interval change is appreciated radiographically since the prior study from 12/18/2024 at 5:21 a.m.   Portions of this note were completed with a voice recognition program.  Electronically Signed By-Stan Hogan MD On:12/19/2024 5:49 AM      XR Chest 1 View    Result Date: 12/18/2024  Increased bilateral infiltrates are seen. The findings may represent worsening infectious multifocal pneumonia. Increased pulmonary edema cannot be excluded. Aspiration pneumonia cannot be excluded. Please see above comments for further detail.   Portions of this note were completed with a voice recognition program.  Electronically Signed ByCorie Hogan MD On:12/18/2024 6:07 AM        Assessment / Plan     Summary: 79 y.o. ESRD on dialysis, type 2 diabetes, dementia, discitis on vancomycin, A-fib, restrictive lung disease who presents to the emergency department for evaluation of hypoxia and shortness of breath. Patient was started on supplemental oxygen to maintain his oxygen saturations. He was given a dose of Bumex overnight. His chest x-ray was consistent with bilateral pleural effusions. Pulmonology was consulted for the bilateral pleural effusions and and nephrology consulted for possible volume overload. Pulmonology was performing a thoracentesis this morning. Thoracentesis was showing bloody fluid. Patient suffered CODE BLUE. Likely bleeding into his airways causing hypoxic arrest. He received CPR for 6 minutes and achieved ROSC. He was transferred to the ICU, intubated and on blood pressure support. Bronchoscopy was showing blood clots in his airways. Patient was started on CRRT. Patient's medical status is tenuous. He is currently on 1 pressor. CRRT is removing fluid at a slow rate. Patient started on micafungin on 12/7/2024. Patient extubated on 12/8/2024. Patient on high flow nasal cannula 30 L 25%. Slowly weaning down on supplemental oxygen, breathing more comfortably. Poor mental status still waxing and waning. Patient has been transitioned over to hemodialysis, blood pressure tolerating.     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  Had about 1L removed yesterday.    Midodrine 10mg on HD days, cont daily weights, on RONALD outpt, seroquel prn dose on HD days   Still needing oxygen, suspect persistent edema component, monitoring/wean as able, work on resp hygiene as able  Psychiatry consulted yesterday for additional assistance with agitation/hyperactive delirium and dementia, Dr. Vines consulted initially but doesn't appear he saw, will see if weekend psychologist can evaluate as instead   Cont nightly seroquel, dose increased to 25mg last night, cont delirium precautions, stop telemetry/has been stable, transferring to towers  We will try to space out labs but checking am dose again tomorrow, monitor electrolytes  Po improving, variable depending on mentation, will take NG out to try and minimize agitation and monitor po intake (kub checked as tube had been dislodged some and tube was in mid stomach so need to remove or advance otherwise, on my personal review)  Remains on modified diet, cont slp monitoring, aspiration precautions  Discussed with pulmonology deferring additional antibiotics currently, suspect pulmonary edema on cxr, unless fever or worsening status deferring additional abx acutely. Continue respiratory hygiene as able, nebulizers/BPH, appreciate pulmonology assistance, wean as able  discussed with ID previously, has completed IV vancomycin course for staph epi discitis, apparently Dr. Sigala (ID in [place]) saw him and 6 week course would be completed 12/14   Cont iv micafungin for 14d course, from 12/5 positive culture repeat from central and arterial line from 12/7 negative   Last dose to complete tomorrow  EEG from prior hospitalization 11/15 and 12/10 without seizure activity noted on limited study.  Keppra has been dc'd given  concern of contribution to mentation.  Will continue to monitor without this.    12/1 ct head had a small hematoma, no acute abnormality, has generalized parenchymal volume loss noted, suspicious for underlying component of dementia along with uremia/hospital delirium contributing to current issues with mentation   Continue with scheduled Tylenol course to try and minimize any sedating pain medications.  Lidocaine patch as needed, Voltaren.  Continue Synthroid  Continue SSI and monitor blood glucose  Continue famotidine  Continue PT/OT  Continue supportive care discussions with patient and family, guarded long-term prognosis  Check a.m. CBC, BMP, magnesium, phosphorus x1   Continue hospitalization at current level of care    Dispo: Ultimately goal for going home with family support once medically stable.   D/w SW    VTE Prophylaxis:  Pharmacologic & mechanical VTE prophylaxis orders are present.      Level Of Support Discussed With: Next of Kin (If No Surrogate)  Code Status (Patient has no pulse and is not breathing): No CPR (Do Not Attempt to Resuscitate)  Medical Interventions (Patient has pulse or is breathing): Full Support

## 2024-12-21 NOTE — PLAN OF CARE
Goal Outcome Evaluation:   Pt had a good amount of sleep last night. He had roughly 8 hours of sleep after receiving medicine. Coretrack was noticed to be at 63cm instead of 76cm. Xray was obtained for placement verification.  He did have some increased congestion. Deep suction was attempted but there was nothing that came out per RT.  No other worsening changes noted.

## 2024-12-21 NOTE — PROGRESS NOTES
Pulmonary / Critical Care Progress Note      Patient Name: Nelson Wang  : 1945  MRN: [id number]  Primary Care Physician:  Domingo Bravo MD  Date of admission: 2024    Subjective   Subjective   Follow-up for post cardiac arrest with hemoptysis, acute hypoxic respiratory failure, acute on chronic congestive diastolic heart failure, acute cardiogenic pulmonary edema, loculated right-sided pleural effusion, ESRD on hemodialysis, GASTON with home CPAP.     No acute events overnight.    This morning,  Currently on 1 L  Remains bedridden  Family at bedside  Patient slowly improving  Tolerating tube feeds  Remains on micafungin  White count normal  No fever or chills    Objective   Objective     Vitals:   Temp:  [97.3 °F (36.3 °C)-98.6 °F (37 °C)] 98.2 °F (36.8 °C)  Heart Rate:  [] 98  Resp:  [16-24] 18  BP: (122-175)/(43-84) 175/43  Flow (L/min) (Oxygen Therapy):  [2] 2  Physical Exam   Vital Signs Reviewed   General: Well-appearing elderly male, confused, lying in bed  HEENT:  PERRL, EOMI.  OP, nares clear, no sinus tenderness  Neck:  Supple, no JVD, no thyromegaly  Chest:  good aeration, no currently crackles, no increased work of breathing noted on 1 L  CV: RRR, no MGR, pulses 2+, equal.  Abd:  Soft, NT, ND, + BS, no HSM  EXT:  no clubbing, no cyanosis, no edema  Skin: No rashes or lesions noted      Result Review    Result Review:  I have personally reviewed the results from the time of this admission to 2024 14:19 EST and agree with these findings:  [x]  Laboratory  [x]  Microbiology  [x]  Radiology  []  EKG/Telemetry   []  Cardiology/Vascular   []  Pathology  []  Old records  []  Other:  Most notable findings include:         Lab 24  0650 24  0444 24  0449 24  1453 24  0554 24  0452 12/15/24  0445   WBC 5.56  --  5.53 5.62 6.47 6.51 8.31   HEMOGLOBIN 8.4*  --  8.8* 8.3* 9.3* 10.4* 10.7*   HEMATOCRIT 27.5*  --  29.2* 27.5* 31.3* 35.1* 36.6*   PLATELETS  184  --  169 153 149 141 153   SODIUM 130*  --  129* 127* 129* 129* 134*   POTASSIUM 4.0  --  4.1 4.1 4.2 4.3 4.2   CHLORIDE 90*  --  90* 93* 93* 92* 94*   CO2 26.4  --  28.7 24.3 26.2 18.7* 21.0*   BUN 68*  --  41* 72* 47* 88* 65*   CREATININE 4.52*  --  3.11* 5.02* 3.63* 6.03* 4.90*   GLUCOSE 146*  --  138* 151* 159* 216* 178*   CALCIUM 9.7  --  9.4 8.9 9.4 9.5 9.7   PHOSPHORUS  --  4.5 3.8 6.1* 4.3 5.8* 4.8*   TOTAL PROTEIN  --   --   --   --   --   --  7.3   ALBUMIN  --   --   --   --  2.7*  --  3.2*   GLOBULIN  --   --   --   --   --   --  4.1       Assessment & Plan   Assessment / Plan     Active Hospital Problems:  Active Hospital Problems    Diagnosis     **Hypoxia      Impression:  Status post cardiac arrest secondary to hypoxia  Acute hypoxemic and hypercapnic respiratory failure require mechanical ventilation  Hemoptysis  Concern for intraparenchymal bleed   Status post thoracentesis  Candidemia  Acute on chronic hypoxic respiratory failure  Acute cardiogenic pulmonary edema  End-stage renal disease on dialysis leading to heart failure  Acute diastolic heart failure with EF of 56-60  Staph epidermidis discitis on vancomycin  Type 2 diabetes  A-fib on apixaban  Altered mental status  Metabolic encephalopathy  Acute hyperactive delirium  Therapeutic drug monitoring of antibiotics    Plan:  -Continue to wean supinoxin maintain SpO2 greater than 90%  -12/20/2024 CXR reviewed demonstrating pulmonary edema  -Continue micafungin to complete 2 weeks antifungal after first negative culture  -Continue Brovana, Pulmicort, DuoNebs  -Continue tube feeds per primary  -Dialysis per nephrology  -Continue aspiration cautions  -Encourage activity as tolerated  -Patient remains extremely weak.  However he is relatively stable from a pulmonary standpoint.  Will sign off for now.  Please call with questions or if patient worsens clinically.     Pharmacologic & mechanical VTE prophylaxis orders are present.    CODE STATUS:    Level Of Support Discussed With: Next of Kin (If No Surrogate)  Code Status (Patient has no pulse and is not breathing): No CPR (Do Not Attempt to Resuscitate)  Medical Interventions (Patient has pulse or is breathing): Full Support    Labs, imaging, and medications personally reviewed  Discussed with primary and patient    Electronically signed by BECKY Rosenthal, 12/21/24, 2:19 PM EST.  This patient was seen by both a physician and a NP. I, Sandra Espinal MD, spent >50% of time in accordance with split shared billing. This included personally reviewing all pertinent labs, imaging, microbiology and documentation. Also discussing the case with the patient and any available family, the admitting physician and any available ancillary staff.   Electronically signed by Sandra Espinal MD, 12/21/24, 3:52 PM EST.

## 2024-12-22 LAB
ALBUMIN SERPL-MCNC: 3.2 G/DL (ref 3.5–5.2)
ANION GAP SERPL CALCULATED.3IONS-SCNC: 15 MMOL/L (ref 5–15)
BASOPHILS # BLD AUTO: 0.07 10*3/MM3 (ref 0–0.2)
BASOPHILS NFR BLD AUTO: 1 % (ref 0–1.5)
BUN SERPL-MCNC: 62 MG/DL (ref 8–23)
BUN/CREAT SERPL: 12.7 (ref 7–25)
CALCIUM SPEC-SCNC: 9.8 MG/DL (ref 8.6–10.5)
CHLORIDE SERPL-SCNC: 96 MMOL/L (ref 98–107)
CO2 SERPL-SCNC: 23 MMOL/L (ref 22–29)
CREAT SERPL-MCNC: 4.89 MG/DL (ref 0.76–1.27)
DEPRECATED RDW RBC AUTO: 61.3 FL (ref 37–54)
EGFRCR SERPLBLD CKD-EPI 2021: 11.5 ML/MIN/1.73
EOSINOPHIL # BLD AUTO: 0.54 10*3/MM3 (ref 0–0.4)
EOSINOPHIL NFR BLD AUTO: 7.7 % (ref 0.3–6.2)
ERYTHROCYTE [DISTWIDTH] IN BLOOD BY AUTOMATED COUNT: 19.9 % (ref 12.3–15.4)
GLUCOSE BLDC GLUCOMTR-MCNC: 104 MG/DL (ref 70–99)
GLUCOSE BLDC GLUCOMTR-MCNC: 136 MG/DL (ref 70–99)
GLUCOSE BLDC GLUCOMTR-MCNC: 95 MG/DL (ref 70–99)
GLUCOSE SERPL-MCNC: 118 MG/DL (ref 65–99)
HCT VFR BLD AUTO: 27.5 % (ref 37.5–51)
HGB BLD-MCNC: 8.5 G/DL (ref 13–17.7)
IMM GRANULOCYTES # BLD AUTO: 0.13 10*3/MM3 (ref 0–0.05)
IMM GRANULOCYTES NFR BLD AUTO: 1.8 % (ref 0–0.5)
LYMPHOCYTES # BLD AUTO: 1.6 10*3/MM3 (ref 0.7–3.1)
LYMPHOCYTES NFR BLD AUTO: 22.7 % (ref 19.6–45.3)
MAGNESIUM SERPL-MCNC: 2.1 MG/DL (ref 1.6–2.4)
MCH RBC QN AUTO: 26.7 PG (ref 26.6–33)
MCHC RBC AUTO-ENTMCNC: 30.9 G/DL (ref 31.5–35.7)
MCV RBC AUTO: 86.5 FL (ref 79–97)
MONOCYTES # BLD AUTO: 0.9 10*3/MM3 (ref 0.1–0.9)
MONOCYTES NFR BLD AUTO: 12.8 % (ref 5–12)
NEUTROPHILS NFR BLD AUTO: 3.8 10*3/MM3 (ref 1.7–7)
NEUTROPHILS NFR BLD AUTO: 54 % (ref 42.7–76)
NRBC BLD AUTO-RTO: 0 /100 WBC (ref 0–0.2)
PHOSPHATE SERPL-MCNC: 5.1 MG/DL (ref 2.5–4.5)
PLATELET # BLD AUTO: 222 10*3/MM3 (ref 140–450)
PMV BLD AUTO: 10.5 FL (ref 6–12)
POTASSIUM SERPL-SCNC: 4.9 MMOL/L (ref 3.5–5.2)
PROCALCITONIN SERPL-MCNC: 0.62 NG/ML (ref 0–0.25)
RBC # BLD AUTO: 3.18 10*6/MM3 (ref 4.14–5.8)
SODIUM SERPL-SCNC: 134 MMOL/L (ref 136–145)
WBC NRBC COR # BLD AUTO: 7.04 10*3/MM3 (ref 3.4–10.8)

## 2024-12-22 PROCEDURE — 99232 SBSQ HOSP IP/OBS MODERATE 35: CPT | Performed by: INTERNAL MEDICINE

## 2024-12-22 PROCEDURE — 82948 REAGENT STRIP/BLOOD GLUCOSE: CPT

## 2024-12-22 PROCEDURE — 97110 THERAPEUTIC EXERCISES: CPT

## 2024-12-22 PROCEDURE — 80069 RENAL FUNCTION PANEL: CPT | Performed by: INTERNAL MEDICINE

## 2024-12-22 PROCEDURE — 83735 ASSAY OF MAGNESIUM: CPT | Performed by: INTERNAL MEDICINE

## 2024-12-22 PROCEDURE — 94799 UNLISTED PULMONARY SVC/PX: CPT

## 2024-12-22 PROCEDURE — 84145 PROCALCITONIN (PCT): CPT | Performed by: INTERNAL MEDICINE

## 2024-12-22 PROCEDURE — 85025 COMPLETE CBC W/AUTO DIFF WBC: CPT | Performed by: INTERNAL MEDICINE

## 2024-12-22 PROCEDURE — 25010000002 MICAFUNGIN SODIUM 100 MG RECONSTITUTED SOLUTION 1 EACH VIAL: Performed by: INTERNAL MEDICINE

## 2024-12-22 PROCEDURE — 97116 GAIT TRAINING THERAPY: CPT

## 2024-12-22 PROCEDURE — 82948 REAGENT STRIP/BLOOD GLUCOSE: CPT | Performed by: NURSE PRACTITIONER

## 2024-12-22 RX ORDER — POLYETHYLENE GLYCOL 3350 17 G/17G
17 POWDER, FOR SOLUTION ORAL DAILY PRN
Status: DISCONTINUED | OUTPATIENT
Start: 2024-12-22 | End: 2025-01-08 | Stop reason: HOSPADM

## 2024-12-22 RX ORDER — QUETIAPINE FUMARATE 25 MG/1
25 TABLET, FILM COATED ORAL NIGHTLY
Status: DISCONTINUED | OUTPATIENT
Start: 2024-12-22 | End: 2024-12-31

## 2024-12-22 RX ORDER — QUETIAPINE FUMARATE 25 MG/1
12.5 TABLET, FILM COATED ORAL DAILY PRN
Status: DISCONTINUED | OUTPATIENT
Start: 2024-12-22 | End: 2025-01-08 | Stop reason: HOSPADM

## 2024-12-22 RX ORDER — MIDODRINE HYDROCHLORIDE 10 MG/1
10 TABLET ORAL
Status: DISCONTINUED | OUTPATIENT
Start: 2024-12-23 | End: 2024-12-30

## 2024-12-22 RX ORDER — AMOXICILLIN 250 MG
2 CAPSULE ORAL 2 TIMES DAILY PRN
Status: DISCONTINUED | OUTPATIENT
Start: 2024-12-22 | End: 2025-01-08 | Stop reason: HOSPADM

## 2024-12-22 RX ORDER — BISACODYL 10 MG
10 SUPPOSITORY, RECTAL RECTAL DAILY PRN
Status: DISCONTINUED | OUTPATIENT
Start: 2024-12-22 | End: 2025-01-08 | Stop reason: HOSPADM

## 2024-12-22 RX ORDER — FAMOTIDINE 20 MG/1
10 TABLET, FILM COATED ORAL DAILY
Status: DISCONTINUED | OUTPATIENT
Start: 2024-12-22 | End: 2025-01-08 | Stop reason: HOSPADM

## 2024-12-22 RX ORDER — ACETAMINOPHEN 325 MG/1
650 TABLET ORAL EVERY 6 HOURS PRN
Status: DISCONTINUED | OUTPATIENT
Start: 2024-12-22 | End: 2024-12-29 | Stop reason: SDUPTHER

## 2024-12-22 RX ADMIN — Medication 2.5 MG: at 20:11

## 2024-12-22 RX ADMIN — Medication 10 ML: at 09:45

## 2024-12-22 RX ADMIN — BUDESONIDE 0.5 MG: 0.5 INHALANT ORAL at 09:57

## 2024-12-22 RX ADMIN — WHITE PETROLATUM 1 APPLICATION: 1.75 OINTMENT TOPICAL at 17:21

## 2024-12-22 RX ADMIN — MICAFUNGIN SODIUM 100 MG: 100 INJECTION, POWDER, LYOPHILIZED, FOR SOLUTION INTRAVENOUS at 09:45

## 2024-12-22 RX ADMIN — ACETAMINOPHEN 650 MG: 325 TABLET ORAL at 20:11

## 2024-12-22 RX ADMIN — Medication 10 ML: at 20:12

## 2024-12-22 RX ADMIN — FAMOTIDINE 10 MG: 20 TABLET, FILM COATED ORAL at 09:45

## 2024-12-22 RX ADMIN — QUETIAPINE FUMARATE 25 MG: 25 TABLET ORAL at 20:11

## 2024-12-22 RX ADMIN — BUDESONIDE 0.5 MG: 0.5 INHALANT ORAL at 19:05

## 2024-12-22 RX ADMIN — ARFORMOTEROL TARTRATE 15 MCG: 15 SOLUTION RESPIRATORY (INHALATION) at 19:05

## 2024-12-22 RX ADMIN — LEVOTHYROXINE SODIUM 175 MCG: 0.03 TABLET ORAL at 07:19

## 2024-12-22 RX ADMIN — ARFORMOTEROL TARTRATE 15 MCG: 15 SOLUTION RESPIRATORY (INHALATION) at 09:57

## 2024-12-22 NOTE — PROGRESS NOTES
[facility]   Hospitalist Progress Note    Date of admission: 12/5/2024  Patient Name: Nelson Wang  1945  Date: 12/22/2024      Subjective     Chief Complaint   Patient presents with    Shortness of Breath       Interval Followup: Doing better overall still with confusion but more awake and conversant.  Is not able to answer any review of systems questions consistently but is able to tell me not having trouble with his breathing seemed to have eaten okay earlier.  Still is a sitter as he is restless at times try to get up and walk around and is at risk for falls but otherwise is not aggressive or agitated and has been pleasant with staff.      Objective     Vitals:   Temp:  [97.2 °F (36.2 °C)-97.7 °F (36.5 °C)] 97.3 °F (36.3 °C)  Heart Rate:  [80-91] 89  Resp:  [18-22] 18  BP: ()/(53-65) 167/65    Physical Exam  Back in chair at the window by the nurses station with an aide present, speech more clear and interactive, talking in more prolonged sentences today, was unable to tell me where he was, apparently told blood later earlier that he was in [place] however,  Still some bilateral rhonchi in lower lung fields, no wheezing on room air today  RRR no lower extremity pitting edema  Generalized muscle wasting     Result Review:  Vital signs, labs and recent relevant imaging reviewed.      CBC          12/18/2024    14:53 12/19/2024    04:49 12/20/2024    06:50   CBC   WBC 5.62  5.53  5.56    RBC 3.06  3.27  3.12    Hemoglobin 8.3  8.8  8.4    Hematocrit 27.5  29.2  27.5    MCV 89.9  89.3  88.1    MCH 27.1  26.9  26.9    MCHC 30.2  30.1  30.5    RDW 20.0  20.0  20.3    Platelets 153  169  184      CMP          12/18/2024    14:53 12/19/2024    04:49 12/20/2024    06:50   CMP   Glucose 151  138  146    BUN 72  41  68    Creatinine 5.02  3.11  4.52    EGFR 11.1  19.7  12.6    Sodium 127  129  130    Potassium 4.1  4.1  4.0    Chloride 93  90  90    Calcium 8.9  9.4  9.7    BUN/Creatinine Ratio 14.3   13.2  15.0    Anion Gap 9.7  10.3  13.6          acetaminophen    artificial tears    senna-docusate sodium **AND** polyethylene glycol **AND** [DISCONTINUED] bisacodyl **AND** bisacodyl    dextrose    dextrose    Diclofenac Sodium    glucagon (human recombinant)    heparin (porcine)    heparin (porcine)    hydrALAZINE    ipratropium-albuterol    Lidocaine    LORazepam    ondansetron    Polyvinyl Alcohol-Povidone PF    QUEtiapine    sodium chloride    sodium chloride    sodium chloride    trolamine salicylate    arformoterol, 15 mcg, Nebulization, BID - RT  [Held by provider] atorvastatin, 40 mg, Oral, Daily  budesonide, 0.5 mg, Nebulization, BID - RT  famotidine, 10 mg, Oral, Daily  insulin lispro, 2-7 Units, Subcutaneous, Q6H  [START ON 12/23/2024] levothyroxine, 175 mcg, Oral, Q AM  melatonin, 2.5 mg, Oral, Nightly  micafungin (MYCAMINE) IV, 100 mg, Intravenous, Q24H  [START ON 12/23/2024] midodrine, 10 mg, Oral, Once per day on Monday Wednesday Friday  mineral oil-hydrophilic petrolatum, 1 Application, Topical, Daily  QUEtiapine, 25 mg, Oral, Nightly  sodium chloride, 10 mL, Intravenous, Q12H        XR Abdomen KUB    Result Date: 12/21/2024  Impression: 1.Enteric tube terminates in the left upper quadrant, with tip likely in the mid stomach. 2.Calcification at the right renal pelvis, as before. Electronically Signed: Enrique Villavicencio  12/21/2024 12:22 PM EST  Workstation ID: [id number]    XR Chest 1 View    Result Date: 12/20/2024  Feeding tube extends well below the diaphragm. The tip is not seen. Electronically Signed: Cristian Tiwari MD  12/20/2024 10:38 PM EST  Workstation ID: [id number]    XR Chest 1 View    Result Date: 12/19/2024  No significant interval change is appreciated radiographically since the prior study from 12/18/2024 at 5:21 a.m.   Portions of this note were completed with a voice recognition program.  Electronically Signed By-Stan Hogan MD On:12/19/2024 5:49 AM      XR Chest 1 View    Result  Date: 12/18/2024  Increased bilateral infiltrates are seen. The findings may represent worsening infectious multifocal pneumonia. Increased pulmonary edema cannot be excluded. Aspiration pneumonia cannot be excluded. Please see above comments for further detail.   Portions of this note were completed with a voice recognition program.  Electronically Signed By-Stan Hogan MD On:12/18/2024 6:07 AM       Assessment / Plan     Summary: 79 y.o. ESRD on dialysis, type 2 diabetes, dementia, discitis on vancomycin, A-fib, restrictive lung disease who presents to the emergency department for evaluation of hypoxia and shortness of breath. Patient was started on supplemental oxygen to maintain his oxygen saturations. He was given a dose of Bumex overnight. His chest x-ray was consistent with bilateral pleural effusions. Pulmonology was consulted for the bilateral pleural effusions and and nephrology consulted for possible volume overload. Pulmonology was performing a thoracentesis this morning. Thoracentesis was showing bloody fluid. Patient suffered CODE BLUE. Likely bleeding into his airways causing hypoxic arrest. He received CPR for 6 minutes and achieved ROSC. He was transferred to the ICU, intubated and on blood pressure support. Bronchoscopy was showing blood clots in his airways. Patient was started on CRRT. Patient's medical status is tenuous. He is currently on 1 pressor. CRRT is removing fluid at a slow rate. Patient started on micafungin on 12/7/2024. Patient extubated on 12/8/2024. Patient on high flow nasal cannula 30 L 25%. Slowly weaning down on supplemental oxygen, breathing more comfortably. Poor mental status still waxing and waning. Patient has been transitioned over to hemodialysis, blood pressure tolerating.     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  Hopefully can avoid additional titration.    Continue midodrine 10mg on HD days, cont daily weights, on RONALD outpt, seroquel prn dose on HD days.  Blood pressure is doing okay on nondialysis days   Off oxygen currently, monitor closely continue respiratory hygiene  NG out monitor closely for aspiration, continue to adjust diet continue speech therapy evaluation, encouraging p.o.,  Continue spot check labs pending from this morning still  Discussed with pulmonology no antibiotics needed, suspect volume related issues on chest x-ray which seems to be doing a little better now  discussed with ID previously, has completed IV vancomycin course for staph epi discitis, apparently Dr. Sigala (ID in [place]) saw him and 6 week course would be completed 12/14   Cont iv micafungin for 14d course, completing today 12/22  Will check repeat blood cultures in am to monitor for clearance   EEG from prior hospitalization 11/15 and 12/10 without seizure activity noted on limited study.  Keppra has been dc'd given concern of contribution to mentation.  Will continue to monitor without this.    12/1 ct head had a small hematoma, no acute abnormality, has generalized parenchymal volume loss noted, suspicious for underlying component of dementia along with uremia/hospital delirium contributing to current issues with  mentation   As needed pain medication, lidocaine patch, Voltaren try to avoid sedating meds  Continue Synthroid  Continue SSI and monitor blood glucose  Continue famotidine  Continue PT/OT  Continue supportive care discussions with patient and family, guarded long-term prognosis  Pending CBC, BMP, magnesium, phosphorus x1   Continue hospitalization at current level of care    Dispo: Ultimately goal for going home with family support once medically stable.   D/w SW    VTE Prophylaxis:  Pharmacologic & mechanical VTE prophylaxis orders are present.      Level Of Support Discussed With: Next of Kin (If No Surrogate)  Code Status (Patient has no pulse and is not breathing): No CPR (Do Not Attempt to Resuscitate)  Medical Interventions (Patient has pulse or is breathing): Full Support

## 2024-12-22 NOTE — CONSULTS
[facility]   PSYCHIATRIC CONSULTATION    Patient Name: Nelson Wang  : 1945  MRN: [id number]  Primary Care Physician:  Domingo Bravo MD  Date of admission: 2024    Referring Provider: HILDA Bravo MD  Reason for Consultation: Agitation    Subjective   Subjective     Chief Complaint: The patient presented is relatively calm but quite confused    HPI:     Nelson Wang is a 79 y.o. male multiple medical problem that included end-stage renal disease on dialysis, type 2 diabetes, atrial fibrillation, restrictive lung disease, dementia and possible bipolar disorder was admitted due to shortness of breath and hypoxia.  The patient was seen in the presence of her daughter in the room who basically provided the information.  The patient was laying in the bed comfortably but appeared to be quite confused.  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  The patient was doing better in regard to his mental status but then when he returned home he was not sleeping.  The patient had appointment with his outpatient psychiatrist Dr. Conner who changed the patient medication regiment, discontinued Abilify and started patient on Seroquel 25 mg at night.  According to the daughter the patient appeared to be doing better, this morning he ate well and did not seem to be agitated.  The patient was oriented to his name, he was not sure why he was, frequently was saying that he wanted to eat pizza and there was a pizza place nearby.  The patient was looking at his daughter but could not tell that her name or how this person was related to him.    Review of Systems   All systems were reviewed and negative except for: Confusion    Personal History     Past Medical History:   Diagnosis Date    Abnormal  stress test     Anemia     IRON INFUSIONS WITH DIALYSIS    Anesthesia     WITH HIP REPLACEMENT DAUGHTER BELIEVES HAD SVT 2000    Ankle pain, right     Anxiety and depression     Arthritis     CKD (chronic kidney disease), stage IV     DIALYSIS [id number] SHILEY IN RIGHT CHEST    Diabetes     GERD (gastroesophageal reflux disease)     Gout     High cholesterol     History of peritoneal dialysis     HL (hearing loss)     Hyperkalemia     Hypertension     Hypothyroidism     Insomnia     Night terrors     Psoriasis     Psoriasis     Sleep apnea     Sleep walking     Thrombocytopenia     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    Vitiligo        Past Surgical History:   Procedure Laterality Date    ABDOMINAL SURGERY      ANKLE SURGERY Right 11/1990    APPENDECTOMY N/A 1954    ARTERIOVENOUS FISTULA/SHUNT SURGERY Left 07/16/2024    Procedure: Creation of left arm arteriovenous fistula;  Surgeon: Moses Mir MD;  Location: [facility] MAIN OR;  Service: Vascular;  Laterality: Left;    BRONCHOSCOPY N/A 03/01/2024    Procedure: BRONCHOSCOPY WITH BAL AND WASHINGS;  Surgeon: Sandra Espinal MD;  Location: [facility] ENDOSCOPY;  Service: Pulmonary;  Laterality: N/A;  PNEUMONIA    BRONCHOSCOPY N/A 08/30/2024    Procedure: BRONCHOSCOPY WITH BALS AND WASHINGS;  Surgeon: Sandra Espinal MD;  Location: [facility] ENDOSCOPY;  Service: Pulmonary;  Laterality: N/A;  MUCOUS PLUGGING    CARDIAC CATHETERIZATION N/A 05/29/2024    Procedure: Left Heart Cath with possible coronary angioplasty;  Surgeon: Stephan Nichols MD;  Location: [facility] CATH INVASIVE LOCATION;  Service: Cardiology;  Laterality: N/A;    COLONOSCOPY N/A 06/2022    [facility]    ENDOSCOPY N/A 10/28/2024    Procedure: ESOPHAGOGASTRODUODENOSCOPY INSERTION OF LIGHTED INSTRUMENT TO VIEW ESOPHAGUS, STOMACH AND SMALL INTESTINE;  Surgeon: Kingsley Peraza MD;  Location: [facility] ENDOSCOPY;  Service: Gastroenterology;  Laterality: N/A;  Gastritis    FRACTURE SURGERY      HIP  BIPOLAR REPLACEMENT Right 01/2000    INSERTION HEMODIALYSIS CATHETER Left 04/09/2024    Procedure: HEMODIALYSIS CATHETER INSERTION;  Surgeon: Jose Berry MD;  [facility];  Service: General;  Laterality: Left;    INSERTION HEMODIALYSIS CATHETER N/A 04/12/2024    Procedure: Tunneled hemodialysis catheter insertion;  Surgeon: Enrique Vinson MD;  [facility];  Service: Vascular;  Laterality: N/A;    INSERTION PERITONEAL DIALYSIS CATHETER N/A 03/27/2023    Procedure: LAPAROSCOPIC INSERTION PERITONEAL DIALYSIS CATHETER, LAPAROSCOPIC OMENTOPEXY WITH LYSIS OF ADHESIONS;  Surgeon: Jose Berry MD;  [facility];  Service: General;  Laterality: N/A;    INSERTION PERITONEAL DIALYSIS CATHETER Left 07/23/2023    Procedure: REVISION OF PERITONEAL DIALYSIS CATHETER;  Surgeon: Radha Oreilly MD;  Location: [facility] MAIN OR;  Service: General;  Laterality: Left;    JOINT REPLACEMENT      REMOVAL PERITONEAL DIALYSIS CATHETER N/A 04/09/2024    Procedure: REMOVAL PERITONEAL DIALYSIS CATHETER;  Surgeon: Jose Berry MD;  Location: [facility] MAIN OR;  Service: General;  Laterality: N/A;    RENAL BIOPSY Left 07/15/2022    UPPER GASTROINTESTINAL ENDOSCOPY      VASCULAR SURGERY      WRIST SURGERY      UNSURE WHICH SIDE DAUGHTER REPORTS HAD SEVERE  CUT FROM WINDOW AND THEY HAD TO DO RECONSTRUCTIVE SURGERY WITH VESSELS AND NERVES       Past Psychiatric History: Bipolar disorder, dementia    Psychiatric Hospitalizations: Previously was hospitalized for psychosis and altered mental status    Suicide Attempts: Not reported    Prior Treatment and Medications Tried: Zoloft, Abilify, Seroquel        Family History: family history includes Cancer (age of onset: 35) in his sister; Colon cancer (age of onset: 77) in his sister; Diabetes in his brother, mother, and sister; Heart disease in his father and paternal uncle; Sleep apnea in his daughter and paternal aunt.  Otherwise pertinent FHx was reviewed and not pertinent to current issue.    Social History:     Social History     Socioeconomic History    Marital status:    Tobacco Use    Smoking status: Former     Current packs/day: 0.00     Average packs/day: 1 pack/day for 10.0 years (10.0 ttl pk-yrs)     Types: Cigarettes     Start date:      Quit date: 2000     Years since quittin.9     Passive exposure: Past    Smokeless tobacco: Never    Tobacco comments:     quit 25 years ago, chews nicotine gum in past   Vaping Use    Vaping status: Former   Substance and Sexual Activity    Alcohol use: Not Currently     Comment: 1 DRINK/DAY-rarely    Drug use: Never    Sexual activity: Defer       Substance Abuse History: reports that he quit smoking about 24 years ago. His smoking use included cigarettes. He started smoking about 34 years ago. He has a 10 pack-year smoking history. He has been exposed to tobacco smoke. He has never used smokeless tobacco. He reports that he does not currently use alcohol. He reports that he does not use drugs.    Home Medications:  Budeson-Glycopyrrol-Formoterol, Levothyroxine Sodium, Methoxy PEG-Epoetin Beta, QUEtiapine, Vancomycin HCl, atorvastatin, famotidine, midodrine, sertraline, sevelamer, and torsemide      Allergies:  No Known Allergies    Objective   Objective     Vitals:   Temp:  [97.2 °F (36.2 °C)-98.2 °F (36.8 °C)] 97.2 °F (36.2 °C)  Heart Rate:  [80-98] 90  Resp:  [16-22] 18  BP: ()/(43-77) 152/62  Flow (L/min) (Oxygen Therapy):  [2] 2  Physical Exam       Mental Status Exam:     Hygiene:   fair  Cooperation:   Tried to be cooperative  Eye Contact:  Good  Psychomotor Behavior:  Restless  Mood: Was unable to describe but was smiling  Affect:  full range  Speech:  Minimal  Language: single words  Thought Process:  Unable to demonstrate  Thought Content:  Unable to demonstrate  Suicidal:  None  Homicidal:   denied  Hallucinations:   patrick  Delusion:  Unable to  demonstrate  Memory:   patrick  Orientation:  Person  Reliability:  good  Insight:   patrick  Judgement:   patrick  Impulse Control:  Fair    Result Review    Result Review:  I have personally reviewed the results from the time of this admission to 12/22/2024 10:18 EST and agree with these findings:  [x]  Laboratory  []  Microbiology  []  Radiology  []  EKG/Telemetry   []  Cardiology/Vascular   []  Pathology  []  Old records  []  Other:  Most notable findings include: cr: 3.9,BNP: 59.000    Assessment & Plan   Assessment / Plan     Brief Patient Summary:  Nelson Wang is a 79 y.o. male who has insulin end-stage renal disease, CHF, who was recently treated for osteomyelitis with vancomycin presented with evidence of confusion and agitation.    Active Hospital Problems:  Active Hospital Problems    Diagnosis     **Hypoxia    Altered mental status      Plan:   Continue sitter for safety, support and orientation; continue the lowest possible dose of Seroquel , currently on 25mg that according to the patient's daughter appeared to be helpful(no agitation), per daughter's report the patient has been taking Zoloft 100mg/daily at home, will recommend to put on 50mg to avoid abrupt discontinuation.        Part of this note may be an electronic transcription/translation of spoken language to printed text using the Dragon Dictation System.    Electronically signed by Jessy Vásquez MD, 12/22/24, 10:18 AM EST.

## 2024-12-22 NOTE — THERAPY TREATMENT NOTE
Acute Care - Physical Therapy Treatment Note  [facility]     Patient Name: Nelson Wang  : 1945  MRN: [id number]  Today's Date: 2024      Visit Dx:     ICD-10-CM ICD-9-CM   1. Acute respiratory failure with hypoxia  J96.01 518.81   2. Acute pulmonary edema  J81.0 518.4   3. Difficulty walking  R26.2 719.7   4. Oropharyngeal dysphagia  R13.12 787.22     Patient Active Problem List   Diagnosis    Essential hypertension    Bradycardia, sinus    Anemia due to chronic kidney disease    Hypothyroidism    Idiopathic gout    Proteinuria    Psoriasis    Thrombocytopenia    Type 2 diabetes mellitus without complication    CKD (chronic kidney disease), stage IV    Hyperlipidemia    Monoclonal gammopathy of unknown significance (MGUS)    Stage 5 chronic kidney disease    Chronic gout without tophus    Peritoneal dialysis catheter dysfunction    Medicare annual wellness visit, subsequent    Chronic cough    Peritonitis associated with peritoneal dialysis    Recurrent pneumonia    Acute on chronic respiratory failure with hypoxia    End stage renal disease    Aspiration pneumonitis    Sepsis    Type 2 diabetes mellitus, with long-term current use of insulin    GERD without esophagitis    Immunosuppression due to drug therapy    Bipolar disorder    Chronic respiratory failure with hypoxia    Shortness of breath    Abnormal stress test    ESRD on dialysis    Abnormal chest CT    Encounter for screening for malignant neoplasm of colon    History of colon polyps    Family history of colon cancer    Intractable pain    Abdominal pain    ESRD (end stage renal disease) on dialysis    AMS (altered mental status)    Hallucinations    LUQ pain    Discitis    Metabolic encephalopathy    Aspiration pneumonia    Discitis of lumbar region    Severe malnutrition    Dementia    Unspecified severe protein-calorie malnutrition    Hypoxia     Past Medical History:   Diagnosis Date    Abnormal stress test     Anemia     IRON INFUSIONS  WITH DIALYSIS    Anesthesia     WITH HIP REPLACEMENT DAUGHTER BELIEVES HAD SVT 2000    Ankle pain, right     Anxiety and depression     Arthritis     CKD (chronic kidney disease), stage IV     DIALYSIS [id number] SHILEY IN RIGHT CHEST    Diabetes     GERD (gastroesophageal reflux disease)     Gout     High cholesterol     History of peritoneal dialysis     HL (hearing loss)     Hyperkalemia     Hypertension     Hypothyroidism     Insomnia     Night terrors     Psoriasis     Psoriasis     Sleep apnea     Sleep walking     Thrombocytopenia     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    Vitiligo      Past Surgical History:   Procedure Laterality Date    ABDOMINAL SURGERY      ANKLE SURGERY Right 11/1990    APPENDECTOMY N/A 1954    ARTERIOVENOUS FISTULA/SHUNT SURGERY Left 07/16/2024    Procedure: Creation of left arm arteriovenous fistula;  Surgeon: Moses Mir MD;  Location: [facility] MAIN OR;  Service: Vascular;  Laterality: Left;    BRONCHOSCOPY N/A 03/01/2024    Procedure: BRONCHOSCOPY WITH BAL AND WASHINGS;  Surgeon: Sandra Espinal MD;  Location: [facility] ENDOSCOPY;  Service: Pulmonary;  Laterality: N/A;  PNEUMONIA    BRONCHOSCOPY N/A 08/30/2024    Procedure: BRONCHOSCOPY WITH BALS AND WASHINGS;  Surgeon: Sandra Espinal MD;  Location: [facility] ENDOSCOPY;  Service: Pulmonary;  Laterality: N/A;  MUCOUS PLUGGING    CARDIAC CATHETERIZATION N/A 05/29/2024    Procedure: Left Heart Cath with possible coronary angioplasty;  Surgeon: Stephan Nichols MD;  Location: [facility] CATH INVASIVE LOCATION;  Service: Cardiology;  Laterality: N/A;    COLONOSCOPY N/A 06/2022    [facility]    ENDOSCOPY N/A 10/28/2024    Procedure: ESOPHAGOGASTRODUODENOSCOPY INSERTION OF LIGHTED INSTRUMENT TO VIEW ESOPHAGUS, STOMACH AND SMALL INTESTINE;  Surgeon: Kingsley Peraza MD;  Location: [facility] ENDOSCOPY;  Service: Gastroenterology;  Laterality: N/A;  Gastritis    FRACTURE SURGERY      HIP BIPOLAR REPLACEMENT Right 01/2000     INSERTION HEMODIALYSIS CATHETER Left 04/09/2024    Procedure: HEMODIALYSIS CATHETER INSERTION;  Surgeon: Jose Berry MD;  [facility];  Service: General;  Laterality: Left;    INSERTION HEMODIALYSIS CATHETER N/A 04/12/2024    Procedure: Tunneled hemodialysis catheter insertion;  Surgeon: Enrique Vinson MD;  [facility];  Service: Vascular;  Laterality: N/A;    INSERTION PERITONEAL DIALYSIS CATHETER N/A 03/27/2023    Procedure: LAPAROSCOPIC INSERTION PERITONEAL DIALYSIS CATHETER, LAPAROSCOPIC OMENTOPEXY WITH LYSIS OF ADHESIONS;  Surgeon: Jose Berry MD;  [facility];  Service: General;  Laterality: N/A;    INSERTION PERITONEAL DIALYSIS CATHETER Left 07/23/2023    Procedure: REVISION OF PERITONEAL DIALYSIS CATHETER;  Surgeon: Radha Oreilly MD;  [facility];  Service: General;  Laterality: Left;    JOINT REPLACEMENT      REMOVAL PERITONEAL DIALYSIS CATHETER N/A 04/09/2024    Procedure: REMOVAL PERITONEAL DIALYSIS CATHETER;  Surgeon: Jose Berry MD;  Location: [facility] MAIN OR;  Service: General;  Laterality: N/A;    RENAL BIOPSY Left 07/15/2022    UPPER GASTROINTESTINAL ENDOSCOPY      VASCULAR SURGERY      WRIST SURGERY      UNSURE WHICH SIDE DAUGHTER REPORTS HAD SEVERE  CUT FROM WINDOW AND THEY HAD TO DO RECONSTRUCTIVE SURGERY WITH VESSELS AND NERVES     PT Assessment (Last 12 Hours)       PT Evaluation and Treatment       Row Name 12/22/24 1006          Physical Therapy Time and Intention    Subjective Information no complaints  -SM     Document Type therapy note (daily note)  -SM     Mode of Treatment individual therapy;physical therapy  -SM     Patient Effort good  -SM     Symptoms Noted During/After Treatment none  -SM     Comment Pt presents with pleasent confusion. Impaired attention, is impulsive with difficulty following command and safety precautions.  -       Row Name 12/22/24 1003          Sit-Stand Transfer     Sit-Stand [place] (Transfers) contact guard;nonverbal cues (demo/gesture);verbal cues  -     Assistive Device (Sit-Stand Transfers) walker, front-wheeled  -       Row Name 12/22/24 1004          Stand-Sit Transfer    Stand-Sit [place] (Transfers) contact guard;nonverbal cues (demo/gesture);verbal cues  -     Assistive Device (Stand-Sit Transfers) walker, front-wheeled  -SM       Row Name 12/22/24 1004          Gait/Stairs (Locomotion)    Gait/Stairs Locomotion gait/ambulation assistive device  -     [place] Level (Gait) contact guard;nonverbal cues (demo/gesture);verbal cues  -     Assistive Device (Gait) walker, front-wheeled  -     Patient was able to Ambulate yes  -     Distance in Feet (Gait) 20  -SM     Deviations/Abnormal Patterns (Gait) festinating/shuffling;gait speed decreased  -     Bilateral Gait Deviations forward flexed posture;heel strike decreased  -     Comment, (Gait/Stairs) Caregiver educated on gait belt use, gait belt applied to pt due to impulsivity.  -       Row Name 12/22/24 1004          Safety Issues/Impairments Affecting Functional Mobility    Impairments Affecting Function (Mobility) balance;cognition;endurance/activity tolerance;strength  -     Cognitive Impairments, Mobility Safety/Performance attention;impulsivity;awareness, need for assistance;safety precaution awareness  -       Row Name 12/22/24 1004          Motor Skills    Therapeutic Exercise hip;knee;ankle  Exercises attempted, however pt has difficult maintaining attention to commands. Able to perform 10x seated marches. Education given to daughter/caregiver on seated exercises to improve strength and ambulation deficits.  -       Row Name             Wound 11/15/24 1945 Right upper chest    Wound - Properties Group Placement Date: 11/15/24  -JR Placement Time: 1945  -JR Side: Right  -JR Orientation: upper  -JR Location: chest  -JR Primary Wound Type: Incision  -JR    Retired Wound -  Properties Group Placement Date: 11/15/24  -JR Placement Time: 1945 -JR Side: Right  -JR Orientation: upper  -JR Location: chest  -JR Primary Wound Type: Incision  -JR    Retired Wound - Properties Group Placement Date: 11/15/24  -JR Placement Time: 1945 -JR Side: Right  -JR Orientation: upper  -JR Location: chest  -JR Primary Wound Type: Incision  -JR    Retired Wound - Properties Group Date first assessed: 11/15/24  -JR Time first assessed: 1945 -JR Side: Right  -JR Location: chest  -JR Primary Wound Type: Incision  -JR      Row Name             Wound 12/05/24 2331 Right lower leg abrasion    Wound - Properties Group Placement Date: 12/05/24  -AW Placement Time: 2331  -AW Side: Right  -AW Orientation: lower  -AW Location: leg  -AW Primary Wound Type: Abrasion  -AW Type: abrasion  -AW Present on Original Admission: Y  -AW    Retired Wound - Properties Group Placement Date: 12/05/24  -AW Placement Time: 2331  -AW Present on Original Admission: Y  -AW Side: Right  -AW Orientation: lower  -AW Location: leg  -AW Primary Wound Type: Abrasion  -AW Type: abrasion  -AW    Retired Wound - Properties Group Placement Date: 12/05/24  -AW Placement Time: 2331  -AW Present on Original Admission: Y  -AW Side: Right  -AW Orientation: lower  -AW Location: leg  -AW Primary Wound Type: Abrasion  -AW Type: abrasion  -AW    Retired Wound - Properties Group Date first assessed: 12/05/24  -AW Time first assessed: 2331  -AW Present on Original Admission: Y  -AW Side: Right  -AW Location: leg  -AW Primary Wound Type: Abrasion  -AW Type: abrasion  -AW      Row Name             Wound 12/06/24 1440 Left lower leg skin tear    Wound - Properties Group Placement Date: 12/06/24  -KE Placement Time: 1440  -KE Side: Left  -KE Orientation: lower  -KE Location: leg  -KE Primary Wound Type: Skin tear  -KE Type: skin tear  -KE    Retired Wound - Properties Group Placement Date: 12/06/24  -KE Placement Time: 1440  -KE Side: Left  -KE Orientation:  lower  -KE Location: leg  -KE Primary Wound Type: Skin tear  -KE Type: skin tear  -KE    Retired Wound - Properties Group Placement Date: 12/06/24  -KE Placement Time: 1440  -KE Side: Left  -KE Orientation: lower  -KE Location: leg  -KE Primary Wound Type: Skin tear  -KE Type: skin tear  -KE    Retired Wound - Properties Group Date first assessed: 12/06/24  -KE Time first assessed: 1440  -KE Side: Left  -KE Location: leg  -KE Primary Wound Type: Skin tear  -KE Type: skin tear  -KE      Row Name 12/22/24 1004          Positioning and Restraints    Pre-Treatment Position sitting in chair/recliner  -SM     Post Treatment Position chair  -SM     In Chair sitting;call light within reach;with family/caregiver;with other staff  With RT and sitter, gait belt applied due to impulsivity  -SM       Row Name 12/22/24 1004          Progress Summary (PT)    Progress Toward Functional Goals (PT) progress toward functional goals is good  -SM               User Key  (r) = Recorded By, (t) = Taken By, (c) = Cosigned By      Initials Name Provider Type    Stan Esposito, RN Registered Nurse    Jackelin Freitas RN Registered Nurse    Neena Weaver RN Registered Nurse    Dana Adamson PTA Physical Therapist Assistant                    Physical Therapy Education       Title: PT OT SLP Therapies (In Progress)       Topic: Physical Therapy (In Progress)       Point: Mobility training (Done)       Learning Progress Summary            Patient Acceptance, E,TB, VU by AV at 12/17/2024 1454                      Point: Home exercise program (Not Started)       Learner Progress:  Not documented in this visit.              Point: Body mechanics (Done)       Learning Progress Summary            Patient Acceptance, E,TB, VU by AV at 12/17/2024 1454                      Point: Precautions (Done)       Learning Progress Summary            Patient Acceptance, E,TB, VU by AV at 12/17/2024 1454                                      User Key        Initials Effective Dates Name Provider Type Discipline    AV 06/11/21 -  Kaushik Chavez, PT Physical Therapist PT                  PT Recommendation and Plan     Progress Summary (PT)  Progress Toward Functional Goals (PT): progress toward functional goals is good   Outcome Measures       Row Name 12/22/24 1011 12/21/24 0935 12/19/24 1454       How much help from another person do you currently need...    Turning from your back to your side while in flat bed without using bedrails? 4  -SM 4  -SM 3  -SM    Moving from lying on back to sitting on the side of a flat bed without bedrails? 4  -SM 4  -SM 2  -SM    Moving to and from a bed to a chair (including a wheelchair)? 4  -SM 1  -SM 3  -SM    Standing up from a chair using your arms (e.g., wheelchair, bedside chair)? 4  -SM 2  -SM 3  -SM    Climbing 3-5 steps with a railing? 2  -SM 1  -SM 1  -SM    To walk in hospital room? 3  -SM 1  -SM 1  -SM    AM-PAC 6 Clicks Score (PT) 21  -SM 13  -SM 13  -SM              User Key  (r) = Recorded By, (t) = Taken By, (c) = Cosigned By      Initials Name Provider Type     Dana Garcia PTA Physical Therapist Assistant                     Time Calculation:    PT Charges       Row Name 12/22/24 1003             Time Calculation    PT Received On 12/22/24  -SM         Timed Charges    70860 - PT Therapeutic Exercise Minutes 8  -SM      99931 - Gait Training Minutes  10  -SM      65898 - PT Therapeutic Activity Minutes 5  -SM         Total Minutes    Timed Charges Total Minutes 23  -SM       Total Minutes 23  -SM                User Key  (r) = Recorded By, (t) = Taken By, (c) = Cosigned By      Initials Name Provider Type    Dana Adamson PTA Physical Therapist Assistant                  Therapy Charges for Today       Code Description Service Date Service Provider Modifiers Qty    [id number] HC PT THERAPEUTIC ACT EA 15 MIN 12/21/2024 Dana Garcia PTA GP 2            PT G-Codes  Outcome Measure Options: AM-PAC 6  Clicks Daily Activity (OT), Optimal Instrument  AM-PAC 6 Clicks Score (PT): 21  AM-PAC 6 Clicks Score (OT): 7    Dana Garcia, PTA  12/22/2024

## 2024-12-22 NOTE — PROGRESS NOTES
[facility]     Nephrology Progress Note      Patient Name: Nelson Wang  : 1945  MRN: [id number]  Primary Care Physician:  Domingo Bravo MD  Date of admission: 2024    Subjective   Subjective     Interval History:  No new events.  More alert, still mildly confused, hard of hearing, does not have his hearing aid in today.  Sitting in chair in the hallway, sitter next to him..      Objective   Objective     Vitals:   Temp:  [97.2 °F (36.2 °C)-97.7 °F (36.5 °C)] 97.3 °F (36.3 °C)  Heart Rate:  [80-98] 89  Resp:  [16-22] 18  BP: ()/(53-77) 167/65  Flow (L/min) (Oxygen Therapy):  [2] 2  Physical Exam:   Constitutional: More alert, mildly confused.  Following commands.   Eyes: sclerae anicteric, no conjunctival injection   HENT: mucous membranes moist.  NG in place.   Neck: Supple, no thyromegaly, no lymphadenopathy, trachea midline, No JVD   Respiratory: Decreased to auscultation bilaterally, nonlabored respirations, some upper airway rhonchi.   Cardiovascular: RRR, no murmurs, rubs, or gallops.   Gastrointestinal: Positive bowel sounds, soft, nontender, nondistended   Musculoskeletal: No edema, no clubbing or cyanosis.  Left upper extremity AV fistula, patent.   Neurologic: Arousable, moving extremities,   Skin: warm and dry, no rashes, areas of hypopigmentation.    Result Review    Result Reviewed:  I have personally reviewed the results from the time of this admission to 2024 13:48 EST and agree with these findings:  [x]  Laboratory  []  Microbiology  [x]  Radiology  []  EKG/Telemetry   []  Cardiology/Vascular   []  Pathology  [x]  Old records  []  Other:        Lab 24  0650 24  0444 24  0449 24  1453 24  0554 24  0452   SODIUM 130*  --  129* 127* 129* 129*   POTASSIUM 4.0  --  4.1 4.1 4.2 4.3   CHLORIDE 90*  --  90* 93* 93* 92*   CO2 26.4  --  28.7 24.3 26.2 18.7*   BUN 68*  --  41* 72* 47* 88*   CREATININE 4.52*  --  3.11* 5.02* 3.63* 6.03*    GLUCOSE 146*  --  138* 151* 159* 216*   EGFR 12.6*  --  19.7* 11.1* 16.4* 8.9*   ANION GAP 13.6  --  10.3 9.7 9.8 18.3*   MAGNESIUM 2.1  --  2.1 2.2 2.2 2.3   PHOSPHORUS  --  4.5 3.8 6.1* 4.3 5.8*             Assessment & Plan   Assessment / Plan       Active Hospital Problems:  Active Hospital Problems    Diagnosis     **Hypoxia        Assessment and Plan:    - ESRD, was on home dialysis.  Left upper extremity AV fistula for access.  Electrolytes reviewed.  Dialysis m/w/f.      - Volume overload,  better now.         - Aspiration pneumonia, and possibly recurrent aspiration, per pulmonary.      - Type 2 diabetes with complications.     - Hypotension blood pressure is acceptable now, on ProAmatine and blood pressure stable.     - Anemia of chronic kidney disease, on RONALD as outpatient.  Getting Epogen while here.     - Altered mentation.  Multifactorial.  Per primary.    Discussed with family yesterday.  Will follow.

## 2024-12-22 NOTE — PLAN OF CARE
Goal Outcome Evaluation:              Outcome Evaluation: aox1, more alert and conversational this shift. weaned to room air, sats >90%. up in wheelchair and ambulating in room/hallway, x2 assist with walker. encouraging PO intake. frequently turns and repositions self in bed. safety sitter continues at bedside due to restlessness and impulsiveness. no s/s or c/o pain. fall risk measures in place. BG monitored. wound and skin care provided as ordered.

## 2024-12-22 NOTE — PLAN OF CARE
Goal Outcome Evaluation:  Plan of Care Reviewed With: patient        Progress: no change  Outcome Evaluation: Safety watch remains at bedside for restlessness and impulsiveness. PRN tylenol given for nonverbal pain cues. Patient rested overnight. Plan of care discussed with daughter

## 2024-12-23 LAB
ALBUMIN SERPL-MCNC: 2.7 G/DL (ref 3.5–5.2)
ANION GAP SERPL CALCULATED.3IONS-SCNC: 16.1 MMOL/L (ref 5–15)
BUN SERPL-MCNC: 68 MG/DL (ref 8–23)
BUN/CREAT SERPL: 12 (ref 7–25)
CALCIUM SPEC-SCNC: 9.8 MG/DL (ref 8.6–10.5)
CHLORIDE SERPL-SCNC: 93 MMOL/L (ref 98–107)
CO2 SERPL-SCNC: 23.9 MMOL/L (ref 22–29)
CREAT SERPL-MCNC: 5.67 MG/DL (ref 0.76–1.27)
DEPRECATED RDW RBC AUTO: 63.8 FL (ref 37–54)
EGFRCR SERPLBLD CKD-EPI 2021: 9.6 ML/MIN/1.73
ERYTHROCYTE [DISTWIDTH] IN BLOOD BY AUTOMATED COUNT: 20.1 % (ref 12.3–15.4)
GLUCOSE BLDC GLUCOMTR-MCNC: 114 MG/DL (ref 70–99)
GLUCOSE BLDC GLUCOMTR-MCNC: 116 MG/DL (ref 70–99)
GLUCOSE BLDC GLUCOMTR-MCNC: 97 MG/DL (ref 70–99)
GLUCOSE BLDC GLUCOMTR-MCNC: 99 MG/DL (ref 70–99)
GLUCOSE SERPL-MCNC: 98 MG/DL (ref 65–99)
HCT VFR BLD AUTO: 27.1 % (ref 37.5–51)
HGB BLD-MCNC: 8.2 G/DL (ref 13–17.7)
MCH RBC QN AUTO: 27.1 PG (ref 26.6–33)
MCHC RBC AUTO-ENTMCNC: 30.3 G/DL (ref 31.5–35.7)
MCV RBC AUTO: 89.4 FL (ref 79–97)
PHOSPHATE SERPL-MCNC: 5.3 MG/DL (ref 2.5–4.5)
PLATELET # BLD AUTO: 245 10*3/MM3 (ref 140–450)
PMV BLD AUTO: 10.5 FL (ref 6–12)
POTASSIUM SERPL-SCNC: 4.4 MMOL/L (ref 3.5–5.2)
RBC # BLD AUTO: 3.03 10*6/MM3 (ref 4.14–5.8)
SODIUM SERPL-SCNC: 133 MMOL/L (ref 136–145)
WBC NRBC COR # BLD AUTO: 6.27 10*3/MM3 (ref 3.4–10.8)

## 2024-12-23 PROCEDURE — 94799 UNLISTED PULMONARY SVC/PX: CPT

## 2024-12-23 PROCEDURE — 25010000002 LORAZEPAM PER 2 MG: Performed by: INTERNAL MEDICINE

## 2024-12-23 PROCEDURE — 85027 COMPLETE CBC AUTOMATED: CPT | Performed by: INTERNAL MEDICINE

## 2024-12-23 PROCEDURE — 80069 RENAL FUNCTION PANEL: CPT | Performed by: INTERNAL MEDICINE

## 2024-12-23 PROCEDURE — 82948 REAGENT STRIP/BLOOD GLUCOSE: CPT | Performed by: NURSE PRACTITIONER

## 2024-12-23 PROCEDURE — 82948 REAGENT STRIP/BLOOD GLUCOSE: CPT

## 2024-12-23 PROCEDURE — 87040 BLOOD CULTURE FOR BACTERIA: CPT | Performed by: INTERNAL MEDICINE

## 2024-12-23 PROCEDURE — 99232 SBSQ HOSP IP/OBS MODERATE 35: CPT | Performed by: INTERNAL MEDICINE

## 2024-12-23 RX ORDER — INSULIN LISPRO 100 [IU]/ML
2-7 INJECTION, SOLUTION INTRAVENOUS; SUBCUTANEOUS
Status: DISCONTINUED | OUTPATIENT
Start: 2024-12-23 | End: 2025-01-08 | Stop reason: HOSPADM

## 2024-12-23 RX ADMIN — ARFORMOTEROL TARTRATE 15 MCG: 15 SOLUTION RESPIRATORY (INHALATION) at 21:06

## 2024-12-23 RX ADMIN — Medication 10 ML: at 08:05

## 2024-12-23 RX ADMIN — Medication 10 ML: at 20:37

## 2024-12-23 RX ADMIN — BUDESONIDE 0.5 MG: 0.5 INHALANT ORAL at 21:06

## 2024-12-23 RX ADMIN — QUETIAPINE FUMARATE 25 MG: 25 TABLET ORAL at 20:38

## 2024-12-23 RX ADMIN — WHITE PETROLATUM 1 APPLICATION: 1.75 OINTMENT TOPICAL at 13:20

## 2024-12-23 RX ADMIN — FAMOTIDINE 10 MG: 20 TABLET, FILM COATED ORAL at 07:54

## 2024-12-23 RX ADMIN — QUETIAPINE FUMARATE 12.5 MG: 25 TABLET ORAL at 07:54

## 2024-12-23 RX ADMIN — Medication 10 ML: at 07:55

## 2024-12-23 RX ADMIN — LEVOTHYROXINE SODIUM 175 MCG: 0.03 TABLET ORAL at 06:50

## 2024-12-23 RX ADMIN — ACETAMINOPHEN 650 MG: 325 TABLET ORAL at 20:40

## 2024-12-23 RX ADMIN — LORAZEPAM 0.5 MG: 2 INJECTION INTRAMUSCULAR; INTRAVENOUS at 08:05

## 2024-12-23 RX ADMIN — MIDODRINE HYDROCHLORIDE 10 MG: 10 TABLET ORAL at 07:54

## 2024-12-23 RX ADMIN — Medication 2.5 MG: at 20:38

## 2024-12-23 NOTE — PLAN OF CARE
Goal Outcome Evaluation:  Plan of Care Reviewed With: spouse, child, patient        Progress: no change  Outcome Evaluation: denies pain/discomfort this shift. premeds given patient for HD today, hd nurse stated patient did well until last hour and started to get restless. noted sundowning s/s and discussed with daughter (she does not want ativan given except for HD needs). continues with SW at bedside d/t impulsiveness. up in chair, wheelchair, bed throughout shift. has not slept this shift, tossing and turning. family at bedside most of shift. no new issues/needs noted at this time.

## 2024-12-23 NOTE — SIGNIFICANT NOTE
12/23/24 0853   Physical Therapy Time and Intention   Session Not Performed patient unavailable for treatment  (Dialysis)

## 2024-12-23 NOTE — PLAN OF CARE
Goal Outcome Evaluation:  Plan of Care Reviewed With: patient        Progress: no change  Outcome Evaluation: Remains on safety watch for restlessness and impulsiveness. Requires multiple redirections and reorientation, Slept intermittently overnight, very restless at times. Scheduled for dialysis 12/23

## 2024-12-23 NOTE — PROGRESS NOTES
"Nutrition Services    Patient Name: Nelson Wang  YOB: 1945  MRN: [id number]  Admission date: 12/5/2024      CLINICAL NUTRITION ASSESSMENT      Reason for Assessment  EN Follow Up   H&P:  Past Medical History:   Diagnosis Date    Abnormal stress test     Anemia     IRON INFUSIONS WITH DIALYSIS    Anesthesia     WITH HIP REPLACEMENT DAUGHTER BELIEVES HAD SVT 2000    Ankle pain, right     Anxiety and depression     Arthritis     CKD (chronic kidney disease), stage IV     DIALYSIS [id number] SHILEY IN RIGHT CHEST    Diabetes     GERD (gastroesophageal reflux disease)     Gout     High cholesterol     History of peritoneal dialysis     HL (hearing loss)     Hyperkalemia     Hypertension     Hypothyroidism     Insomnia     Night terrors     Psoriasis     Psoriasis     Sleep apnea     Sleep walking     Thrombocytopenia     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    Vitiligo         Current Problems:   Active Hospital Problems    Diagnosis     **Hypoxia         Nutrition/Diet History         Narrative   Pt remains on a Diabetic, Puree per SLP recommendation (50 % intake).Cortrak has been removed and EN discontinued. Recommend discontinue Prosource BID and add po Novasource BID. Continue nutrition interventions and RD to follow per protocol.      Anthropometrics        Current Height, Weight Height: 170.2 cm (67\")  Weight: 68.1 kg (150 lb 2.1 oz)   Current BMI Body mass index is 23.51 kg/m².   BMI Classification Normal range Healthy range for age 23-30   % IBW 95%   Adjusted Body Weight (ABW) N/A   Weight Hx  Wt Readings from Last 15 Encounters:   12/23/24 0418 68.1 kg (150 lb 2.1 oz)   12/22/24 0525 68.6 kg (151 lb 3.8 oz)   12/21/24 0500 75.4 kg (166 lb 3.6 oz)   12/20/24 0350 67.4 kg (148 lb 9.4 oz)   12/18/24 2009 65.3 kg (143 lb 15.4 oz)   12/18/24 0300 67 kg (147 lb 11.3 oz)   12/17/24 0440 65.8 kg (145 lb 1 oz)   12/16/24 0547 64.3 kg (141 lb 12.1 oz)   12/13/24 0407 62.5 kg (137 lb 12.6 oz) "   12/12/24 0500 69.3 kg (152 lb 12.5 oz)   12/09/24 0600 70.2 kg (154 lb 12.2 oz)   12/06/24 1228 70.7 kg (155 lb 13.8 oz)   12/05/24 1850 75.3 kg (166 lb 0.1 oz)   12/01/24 1701 71 kg (156 lb 8.4 oz)   11/22/24 1311 71.8 kg (158 lb 3.2 oz)   11/18/24 0351 67.9 kg (149 lb 11.1 oz)   11/17/24 0255 71.9 kg (158 lb 8.2 oz)   11/16/24 0316 67.9 kg (149 lb 11.1 oz)   11/14/24 0611 73.9 kg (162 lb 14.7 oz)   11/13/24 0500 73 kg (161 lb)   11/12/24 0523 73.2 kg (161 lb 6 oz)   11/11/24 1200 72.8 kg (160 lb 7.9 oz)   11/10/24 0500 74.4 kg (164 lb 0.4 oz)   11/08/24 0640 73 kg (160 lb 14.4 oz)   11/07/24 0621 72.7 kg (160 lb 3.2 oz)   11/05/24 0600 68.9 kg (151 lb 14.4 oz)   11/04/24 1402 67 kg (147 lb 11.3 oz)   11/04/24 0410 67.9 kg (149 lb 11.1 oz)   11/01/24 0159 74.3 kg (163 lb 12.8 oz)   10/27/24 0700 72.5 kg (159 lb 13.3 oz)   10/27/24 0346 75.7 kg (166 lb 14.2 oz)   10/09/24 1402 75.7 kg (166 lb 12.8 oz)   10/08/24 1455 75.3 kg (166 lb 0.1 oz)   10/02/24 0812 77.5 kg (170 lb 13.7 oz)   09/28/24 0330 77.2 kg (170 lb 3.1 oz)   09/27/24 1659 76.2 kg (167 lb 15.9 oz)   09/26/24 0823 78.9 kg (174 lb)   09/16/24 1814 79.3 kg (174 lb 13.2 oz)   09/12/24 1344 77.1 kg (169 lb 15.6 oz)   08/30/24 0927 75.6 kg (166 lb 10.7 oz)   08/14/24 0922 76.7 kg (169 lb)   07/17/24 1116 77.2 kg (170 lb 3.2 oz)          Wt Change Observation I/O's reveal -5.3 L since admission (~12 #s)     Estimated/Assessed Needs  Estimated Needs based on: Current Body Weight 62.5 kg       Energy Requirements 25-30 kcal/kg   EST Needs (kcal/day) 1071-6145        Protein Requirements 1.2-1.5 g.kg   EST Daily Needs (g/day) 75-94       Fluid Requirements 1mL/kcal    Estimated Needs (mL/day) 3967-7024     Labs/Medications Phos low, receiving K phos repletion.         Pertinent Labs Reviewed.   Results from last 7 days   Lab Units 12/23/24  0453 12/22/24  1547 12/20/24  0650   SODIUM mmol/L 133* 134* 130*   POTASSIUM mmol/L 4.4 4.9 4.0   CHLORIDE mmol/L 93* 96*  90*   CO2 mmol/L 23.9 23.0 26.4   BUN mg/dL 68* 62* 68*   CREATININE mg/dL 5.67* 4.89* 4.52*   CALCIUM mg/dL 9.8 9.8 9.7   GLUCOSE mg/dL 98 118* 146*     Results from last 7 days   Lab Units 12/23/24  0453 12/22/24  1547 12/20/24  0650 12/20/24  0444 12/19/24  0449   MAGNESIUM mg/dL  --  2.1 2.1  --  2.1   PHOSPHORUS mg/dL 5.3* 5.1*  --    < > 3.8   HEMOGLOBIN g/dL 8.2* 8.5* 8.4*  --  8.8*   HEMATOCRIT % 27.1* 27.5* 27.5*  --  29.2*    < > = values in this interval not displayed.     COVID19   Date Value Ref Range Status   10/08/2024 Not Detected Not Detected - Ref. Range Final     Lab Results   Component Value Date    HGBA1C 5.90 (H) 11/09/2024         Pertinent Medications Reviewed.     Malnutrition Severity Assessment              Nutrition Diagnosis         Nutrition Dx Problem 1 Inadequate oral Intake related to Inability to consume sufficient energy as evidenced by decreased appetite.     Nutrition Intervention           Current Nutrition Orders & Evaluation of Intake       Current PO Diet Diet: Renal; Low Potassium; Texture: Pureed (NDD 1); Fluid Consistency: Nectar Thick   Supplement Orders Placed This Encounter      Dietary Nutrition Supplements ProSource No Carb; Neutral           Nutrition Intervention/Prescription        Continue Diabetic, Puree diet as tolerated  Order Novasource BID  Discontinue Prosource BID        Medical Nutrition Therapy/Nutrition Education          Learner     Readiness N/A  N/A     Method     Response N/A  N/A     Monitor/Evaluation        Monitor PO intake, Pertinent labs, EN delivery/tolerance, GI status, Hemodynamic stability     Nutrition Discharge Plan         To be determined     Electronically signed by:  Stephie Garsia, MARLEEN  12/23/24 13:53 EST

## 2024-12-23 NOTE — PROGRESS NOTES
[facility]   Hospitalist Progress Note    Date of admission: 12/5/2024  Patient Name: Nelson Wang  1945  Date: 12/23/2024      Subjective     Chief Complaint   Patient presents with    Shortness of Breath       Interval Followup: confusion during hd this am, required ativan, family at bedside assisting with redirection.  Pt restless at times      Objective     Vitals:   Temp:  [97.4 °F (36.3 °C)-98.3 °F (36.8 °C)] 98.2 °F (36.8 °C)  Heart Rate:  [55-99] 72  Resp:  [18] 18  BP: (112-178)/(60-93) 112/68    Physical Exam  More confused currently, tired, trying to get out of bed intermittently,   Improving aeration in lower lung fields, no wheezing, on room air  RRR no lower extremity pitting edema  Generalized muscle wasting     Result Review:  Vital signs, labs and recent relevant imaging reviewed.      CBC          12/20/2024    06:50 12/22/2024    15:47 12/23/2024    04:53   CBC   WBC 5.56  7.04  6.27    RBC 3.12  3.18  3.03    Hemoglobin 8.4  8.5  8.2    Hematocrit 27.5  27.5  27.1    MCV 88.1  86.5  89.4    MCH 26.9  26.7  27.1    MCHC 30.5  30.9  30.3    RDW 20.3  19.9  20.1    Platelets 184  222  245      CMP          12/20/2024    06:50 12/22/2024    15:47 12/23/2024    04:53   CMP   Glucose 146  118  98    BUN 68  62  68    Creatinine 4.52  4.89  5.67    EGFR 12.6  11.5  9.6    Sodium 130  134  133    Potassium 4.0  4.9  4.4    Chloride 90  96  93    Calcium 9.7  9.8  9.8    Albumin  3.2  2.7    BUN/Creatinine Ratio 15.0  12.7  12.0    Anion Gap 13.6  15.0  16.1          acetaminophen    artificial tears    senna-docusate sodium **AND** polyethylene glycol **AND** [DISCONTINUED] bisacodyl **AND** bisacodyl    dextrose    dextrose    Diclofenac Sodium    glucagon (human recombinant)    heparin (porcine)    heparin (porcine)    hydrALAZINE    ipratropium-albuterol    Lidocaine    LORazepam    ondansetron    Polyvinyl Alcohol-Povidone PF    QUEtiapine    sodium chloride    sodium chloride    sodium  chloride    trolamine salicylate    arformoterol, 15 mcg, Nebulization, BID - RT  [Held by provider] atorvastatin, 40 mg, Oral, Daily  budesonide, 0.5 mg, Nebulization, BID - RT  famotidine, 10 mg, Oral, Daily  insulin lispro, 2-7 Units, Subcutaneous, BID AC  levothyroxine, 175 mcg, Oral, Q AM  melatonin, 2.5 mg, Oral, Nightly  midodrine, 10 mg, Oral, Once per day on Monday Wednesday Friday  mineral oil-hydrophilic petrolatum, 1 Application, Topical, Daily  QUEtiapine, 25 mg, Oral, Nightly  sodium chloride, 10 mL, Intravenous, Q12H        XR Abdomen KUB    Result Date: 12/21/2024  Impression: 1.Enteric tube terminates in the left upper quadrant, with tip likely in the mid stomach. 2.Calcification at the right renal pelvis, as before. Electronically Signed: Enrique Villavicencio  12/21/2024 12:22 PM EST  Workstation ID: [id number]    XR Chest 1 View    Result Date: 12/20/2024  Feeding tube extends well below the diaphragm. The tip is not seen. Electronically Signed: Cristian Tiwari MD  12/20/2024 10:38 PM EST  Workstation ID: [id number]    XR Chest 1 View    Result Date: 12/19/2024  No significant interval change is appreciated radiographically since the prior study from 12/18/2024 at 5:21 a.m.   Portions of this note were completed with a voice recognition program.  Electronically Signed ByCorie Hogan MD On:12/19/2024 5:49 AM      XR Chest 1 View    Result Date: 12/18/2024  Increased bilateral infiltrates are seen. The findings may represent worsening infectious multifocal pneumonia. Increased pulmonary edema cannot be excluded. Aspiration pneumonia cannot be excluded. Please see above comments for further detail.   Portions of this note were completed with a voice recognition program.  Electronically Signed ByCorie Hogan MD On:12/18/2024 6:07 AM       Assessment / Plan     Summary: 79 y.o. ESRD on dialysis, type 2 diabetes, dementia, discitis on vancomycin, A-fib, restrictive lung disease who presents to the emergency  department for evaluation of hypoxia and shortness of breath. Patient was started on supplemental oxygen to maintain his oxygen saturations. He was given a dose of Bumex overnight. His chest x-ray was consistent with bilateral pleural effusions. Pulmonology was consulted for the bilateral pleural effusions and and nephrology consulted for possible volume overload. Pulmonology was performing a thoracentesis this morning. Thoracentesis was showing bloody fluid. Patient suffered CODE BLUE. Likely bleeding into his airways causing hypoxic arrest. He received CPR for 6 minutes and achieved ROSC. He was transferred to the ICU, intubated and on blood pressure support. Bronchoscopy was showing blood clots in his airways. Patient was started on CRRT. Patient's medical status is tenuous. He is currently on 1 pressor. CRRT is removing fluid at a slow rate. Patient started on micafungin on 12/7/2024. Patient extubated on 12/8/2024. Patient on high flow nasal cannula 30 L 25%. Slowly weaning down on supplemental oxygen, breathing more comfortably. Poor mental status still waxing and waning. Patient has been transitioned over to hemodialysis, blood pressure tolerating.     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  Blood pressure is doing okay on nondialysis days   Off oxygen currently, monitor closely continue respiratory hygiene, doing better with volume removal  Cont diet as tolerated, aspiration precautions, cont adjusted diet  Spot labs today reasonable, bun up to 68, wbc wnl, repeat bl cx to monitor for clearance off abx pending today, will give lab holiday / try to avoid as able    discussed with ID previously, has completed IV vancomycin course for staph epi discitis, apparently Dr. Sigala (ID in [place]) saw him and 6 week course would be completed 12/14   iv micafungin 14d course completed 12/22  EEG from prior hospitalization 11/15 and 12/10 without seizure activity noted on limited study.  Keppra has been dc'd given concern of contribution to mentation.  Will continue to monitor without this.    12/1 ct head had a small hematoma, no acute abnormality, has generalized parenchymal volume loss noted, suspicious for underlying component of dementia along with uremia/hospital delirium contributing to current issues with mentation   As needed pain medication, lidocaine patch, Voltaren try to avoid sedating meds  Continue Synthroid  Continue SSI and monitor blood glucose  Continue famotidine  Continue PT/OT  Continue supportive care discussions with patient and family, guarded long-term prognosis  Pending CBC, BMP, magnesium, phosphorus x1   Continue hospitalization at current level of care    Dispo: Ultimately goal for going home with family support once medically stable.   D/w SW    VTE Prophylaxis:  Pharmacologic & mechanical VTE prophylaxis orders are present.      Level Of Support Discussed With: Next of Kin (If No Surrogate)  Code Status (Patient has no pulse and is not breathing): No CPR (Do Not Attempt to Resuscitate)  Medical Interventions (Patient has pulse or is breathing): Full Support

## 2024-12-23 NOTE — SIGNIFICANT NOTE
12/23/24 1034   OTHER   Discipline occupational therapist   Rehab Time/Intention   Session Not Performed patient unavailable for treatment  (dialysis)

## 2024-12-23 NOTE — PROGRESS NOTES
[facility]     Nephrology Progress Note      Patient Name: Nelson Wang  : 1945  MRN: [id number]  Primary Care Physician:  Domingo Bravo MD  Date of admission: 2024    Subjective   Subjective     Interval History:  No new events.  Seen in dialysis.  Somnolent after receiving Ativan.  I did not attempt to wake him up.  Hemodynamically stable during treatment.  Labs reviewed.      Objective   Objective     Vitals:   Temp:  [97.4 °F (36.3 °C)-98.3 °F (36.8 °C)] 98.1 °F (36.7 °C)  Heart Rate:  [55-99] 96  Resp:  [18] 18  BP: (125-178)/() 125/60  Physical Exam:   constitutional: Somnolent, mildly restless, I did not attempt to wake him up..   Eyes: sclerae anicteric, no conjunctival injection   HENT: mucous membranes moist.     Neck: Supple, no thyromegaly, no lymphadenopathy, trachea midline, No JVD   Respiratory: Decreased to auscultation bilaterally, nonlabored respirations, some upper airway rhonchi.   Cardiovascular: RRR, no murmurs, rubs, or gallops.   Gastrointestinal: Positive bowel sounds, soft,  nondistended   Musculoskeletal: No edema, no clubbing or cyanosis.  Left upper extremity AV fistula, patent.   Neurologic: moving extremities,   Skin: warm and dry, no rashes, areas of hypopigmentation.    Result Review    Result Reviewed:  I have personally reviewed the results from the time of this admission to 2024 12:42 EST and agree with these findings:  [x]  Laboratory  []  Microbiology  [x]  Radiology  []  EKG/Telemetry   []  Cardiology/Vascular   []  Pathology  [x]  Old records  []  Other:        Lab 24  0453 24  1547 24  0650 24  0444 24  0449 24  1453 24  0554   SODIUM 133* 134* 130*  --  129* 127* 129*   POTASSIUM 4.4 4.9 4.0  --  4.1 4.1 4.2   CHLORIDE 93* 96* 90*  --  90* 93* 93*   CO2 23.9 23.0 26.4  --  28.7 24.3 26.2   BUN 68* 62* 68*  --  41* 72* 47*   CREATININE 5.67* 4.89* 4.52*  --  3.11* 5.02* 3.63*   GLUCOSE 98 118* 146*   --  138* 151* 159*   EGFR 9.6* 11.5* 12.6*  --  19.7* 11.1* 16.4*   ANION GAP 16.1* 15.0 13.6  --  10.3 9.7 9.8   MAGNESIUM  --  2.1 2.1  --  2.1 2.2 2.2   PHOSPHORUS 5.3* 5.1*  --  4.5 3.8 6.1* 4.3             Assessment & Plan   Assessment / Plan       Active Hospital Problems:  Active Hospital Problems    Diagnosis     **Hypoxia        Assessment and Plan:    - ESRD, was on home dialysis.  Left upper extremity AV fistula for access.  Electrolytes reviewed.  Dialysis m/w/f.  Seen on dialysis, next treatment probably Thursday.     - Volume overload,  better now.         - Aspiration pneumonia, and possibly recurrent aspiration, per pulmonary.      - Type 2 diabetes with complications.     - Hypotension blood pressure is acceptable now, on ProAmatine and blood pressure stable.     - Anemia of chronic kidney disease, on RONALD as outpatient.  Getting Epogen while here.     - Altered mentation.  Multifactorial.  Per primary.    Discussed with dialysis staff.  Please call with any questions    Will follow.

## 2024-12-23 NOTE — NURSING NOTE
Duration of Treatment 3.5 Hours                                   3 hr 30 min   Access Site AVF   Dialyzer Revaclear    mL/min   Dialysate Temperature (C) 36   Use Crit-Line? No   BFR-As tolerated to a maximum of: 400 mL/min   Prime Dialyzer With NS to Priming Volume? Yes   Dialysate Solution Bath: K+ = 3 mEq, Ca = 2.5mEq   Bicarb 30 meq                                    35   Na+ 137 meq   Fluid Removal: 1.5L as tolerated.                    1500     Pt completed entire treatment.  Safety sitter in room at all times.  GARETH also present.  Last /60.  Report given to HIREN Hunter.  Clean and dry dressing on L AVF site.  Ativan PRN given prior to treatment, see MAR, which allowed pt to completed most of treatment without agitation but towards end of treatment pt did get restless and started trying to get up and was rolling around in bed but was able to complete treatment.

## 2024-12-24 LAB
GLUCOSE BLDC GLUCOMTR-MCNC: 103 MG/DL (ref 70–99)
GLUCOSE BLDC GLUCOMTR-MCNC: 84 MG/DL (ref 70–99)

## 2024-12-24 PROCEDURE — 92526 ORAL FUNCTION THERAPY: CPT

## 2024-12-24 PROCEDURE — 97530 THERAPEUTIC ACTIVITIES: CPT

## 2024-12-24 PROCEDURE — 94799 UNLISTED PULMONARY SVC/PX: CPT

## 2024-12-24 PROCEDURE — 97116 GAIT TRAINING THERAPY: CPT

## 2024-12-24 PROCEDURE — 82948 REAGENT STRIP/BLOOD GLUCOSE: CPT

## 2024-12-24 PROCEDURE — 99232 SBSQ HOSP IP/OBS MODERATE 35: CPT | Performed by: INTERNAL MEDICINE

## 2024-12-24 RX ORDER — OXYCODONE HYDROCHLORIDE 5 MG/1
2.5 TABLET ORAL ONCE
Status: COMPLETED | OUTPATIENT
Start: 2024-12-24 | End: 2024-12-24

## 2024-12-24 RX ADMIN — FAMOTIDINE 10 MG: 20 TABLET, FILM COATED ORAL at 08:37

## 2024-12-24 RX ADMIN — OXYCODONE HYDROCHLORIDE 2.5 MG: 5 TABLET ORAL at 21:23

## 2024-12-24 RX ADMIN — ACETAMINOPHEN 650 MG: 325 TABLET ORAL at 08:38

## 2024-12-24 RX ADMIN — BUDESONIDE 0.5 MG: 0.5 INHALANT ORAL at 20:55

## 2024-12-24 RX ADMIN — LEVOTHYROXINE SODIUM 175 MCG: 0.03 TABLET ORAL at 06:44

## 2024-12-24 RX ADMIN — WHITE PETROLATUM 1 APPLICATION: 1.75 OINTMENT TOPICAL at 08:38

## 2024-12-24 RX ADMIN — ARFORMOTEROL TARTRATE 15 MCG: 15 SOLUTION RESPIRATORY (INHALATION) at 20:55

## 2024-12-24 RX ADMIN — QUETIAPINE FUMARATE 25 MG: 25 TABLET ORAL at 21:23

## 2024-12-24 RX ADMIN — Medication 2.5 MG: at 21:23

## 2024-12-24 RX ADMIN — ACETAMINOPHEN 650 MG: 325 TABLET ORAL at 18:02

## 2024-12-24 NOTE — THERAPY TREATMENT NOTE
Acute Care - Speech Language Pathology   Swallow Treatment Note BRIA Sullivan     Patient Name: Nelson Wang  : 1945  MRN: [id number]  Today's Date: 2024               Admit Date: 2024    Visit Dx:     ICD-10-CM ICD-9-CM   1. Acute respiratory failure with hypoxia  J96.01 518.81   2. Acute pulmonary edema  J81.0 518.4   3. Difficulty walking  R26.2 719.7   4. Oropharyngeal dysphagia  R13.12 787.22     Patient Active Problem List   Diagnosis    Essential hypertension    Bradycardia, sinus    Anemia due to chronic kidney disease    Hypothyroidism    Idiopathic gout    Proteinuria    Psoriasis    Thrombocytopenia    Type 2 diabetes mellitus without complication    CKD (chronic kidney disease), stage IV    Hyperlipidemia    Monoclonal gammopathy of unknown significance (MGUS)    Stage 5 chronic kidney disease    Chronic gout without tophus    Peritoneal dialysis catheter dysfunction    Medicare annual wellness visit, subsequent    Chronic cough    Peritonitis associated with peritoneal dialysis    Recurrent pneumonia    Acute on chronic respiratory failure with hypoxia    End stage renal disease    Aspiration pneumonitis    Sepsis    Type 2 diabetes mellitus, with long-term current use of insulin    GERD without esophagitis    Immunosuppression due to drug therapy    Bipolar disorder    Chronic respiratory failure with hypoxia    Shortness of breath    Abnormal stress test    ESRD on dialysis    Abnormal chest CT    Encounter for screening for malignant neoplasm of colon    History of colon polyps    Family history of colon cancer    Intractable pain    Abdominal pain    ESRD (end stage renal disease) on dialysis    AMS (altered mental status)    Hallucinations    LUQ pain    Discitis    Metabolic encephalopathy    Aspiration pneumonia    Discitis of lumbar region    Severe malnutrition    Dementia    Unspecified severe protein-calorie malnutrition    Hypoxia     Past Medical History:   Diagnosis Date     Abnormal stress test     Anemia     IRON INFUSIONS WITH DIALYSIS    Anesthesia     WITH HIP REPLACEMENT DAUGHTER BELIEVES HAD SVT 2000    Ankle pain, right     Anxiety and depression     Arthritis     CKD (chronic kidney disease), stage IV     DIALYSIS [id number] SHILEY IN RIGHT CHEST    Diabetes     GERD (gastroesophageal reflux disease)     Gout     High cholesterol     History of peritoneal dialysis     HL (hearing loss)     Hyperkalemia     Hypertension     Hypothyroidism     Insomnia     Night terrors     Psoriasis     Psoriasis     Sleep apnea     Sleep walking     Thrombocytopenia     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    Vitiligo      Past Surgical History:   Procedure Laterality Date    ABDOMINAL SURGERY      ANKLE SURGERY Right 11/1990    APPENDECTOMY N/A 1954    ARTERIOVENOUS FISTULA/SHUNT SURGERY Left 07/16/2024    Procedure: Creation of left arm arteriovenous fistula;  Surgeon: Moses Mir MD;  Location: [facility] MAIN OR;  Service: Vascular;  Laterality: Left;    BRONCHOSCOPY N/A 03/01/2024    Procedure: BRONCHOSCOPY WITH BAL AND WASHINGS;  Surgeon: Sandra Espinal MD;  Location: [facility] ENDOSCOPY;  Service: Pulmonary;  Laterality: N/A;  PNEUMONIA    BRONCHOSCOPY N/A 08/30/2024    Procedure: BRONCHOSCOPY WITH BALS AND WASHINGS;  Surgeon: Sandra Espinal MD;  Location: [facility] ENDOSCOPY;  Service: Pulmonary;  Laterality: N/A;  MUCOUS PLUGGING    CARDIAC CATHETERIZATION N/A 05/29/2024    Procedure: Left Heart Cath with possible coronary angioplasty;  Surgeon: Stephan Nichols MD;  Location: [facility] CATH INVASIVE LOCATION;  Service: Cardiology;  Laterality: N/A;    COLONOSCOPY N/A 06/2022    [facility]    ENDOSCOPY N/A 10/28/2024    Procedure: ESOPHAGOGASTRODUODENOSCOPY INSERTION OF LIGHTED INSTRUMENT TO VIEW ESOPHAGUS, STOMACH AND SMALL INTESTINE;  Surgeon: Kingsley Peraza MD;  Location: [facility] ENDOSCOPY;  Service: Gastroenterology;  Laterality: N/A;  Gastritis    FRACTURE SURGERY       HIP BIPOLAR REPLACEMENT Right 01/2000    INSERTION HEMODIALYSIS CATHETER Left 04/09/2024    Procedure: HEMODIALYSIS CATHETER INSERTION;  Surgeon: Jose Berry MD;  [facility];  Service: General;  Laterality: Left;    INSERTION HEMODIALYSIS CATHETER N/A 04/12/2024    Procedure: Tunneled hemodialysis catheter insertion;  Surgeon: Enrique Vinson MD;  [facility];  Service: Vascular;  Laterality: N/A;    INSERTION PERITONEAL DIALYSIS CATHETER N/A 03/27/2023    Procedure: LAPAROSCOPIC INSERTION PERITONEAL DIALYSIS CATHETER, LAPAROSCOPIC OMENTOPEXY WITH LYSIS OF ADHESIONS;  Surgeon: Jose Berry MD;  [facility];  Service: General;  Laterality: N/A;    INSERTION PERITONEAL DIALYSIS CATHETER Left 07/23/2023    Procedure: REVISION OF PERITONEAL DIALYSIS CATHETER;  Surgeon: Radha Oreilly MD;  [facility];  Service: General;  Laterality: Left;    JOINT REPLACEMENT      REMOVAL PERITONEAL DIALYSIS CATHETER N/A 04/09/2024    Procedure: REMOVAL PERITONEAL DIALYSIS CATHETER;  Surgeon: Jose Berry MD;  Location: [facility] MAIN OR;  Service: General;  Laterality: N/A;    RENAL BIOPSY Left 07/15/2022    UPPER GASTROINTESTINAL ENDOSCOPY      VASCULAR SURGERY      WRIST SURGERY      UNSURE WHICH SIDE DAUGHTER REPORTS HAD SEVERE  CUT FROM WINDOW AND THEY HAD TO DO RECONSTRUCTIVE SURGERY WITH VESSELS AND NERVES     SPEECH PATHOLOGY DYSPHAGIA TREATMENT     Subjective/Behavioral Observations: Alert and cooperative, attention to task varies.  Patient had been up walking around the unit.        Day/time of Treatment: 12/24/2024:        Current Diet: Purée, nectar thick.        Current Strategies:One-on-one assist to feed self only when patient is fully awake, alert, and participating.  Alternate small bites and small sips of solids and liquids at a slow rate.  May utilize controlled straw drink with nectar liquid.  Check swallow each  bite/sip.         Treatment received: Dysphagia therapy to address swallow function through exercises and education of strategies.        Results of treatment: Patient much more alert, self administering drinks from cup.  Trials of thin liquid by cup and by straw with minimal delay, vocal quality remaining clear to cervical auscultation.  Trials of soft solids with patient demonstrating extended chewing, minimal pocketing clears given additional time.        Progress toward goals: Adequate        Barriers to Achieving goals: Medical status        Plan of care:/changes in plan: 1. upgrade of diet to include mechanical ground solids, thin liquid.  No straw.  2. positioning fully upright for all p.o. intake and 30 minutes following.  3. one-to-one assist to feed self, alternate small bites and small sips at a slow rate.  Check for pocketing.  Assist to feed self only when patient is fully alert, participated to feed self.  May downgrade to purée solid with nectar thickened liquid if patient demonstrating decreased status.  **Note patient must be fully alert for any p.o. intake.                                                                                                 EDUCATION  The patient has been educated in the following areas:   Modified Diet Instruction.                Time Calculation:    Time Calculation- SLP       Row Name 12/24/24 0956             Time Calculation- SLP    SLP Stop Time 0930  -TB      SLP Received On 12/24/24  -TB         Untimed Charges    83809-NB Treatment Swallow Minutes 40  -TB         Total Minutes    Untimed Charges Total Minutes 40  -TB       Total Minutes 40  -TB                User Key  (r) = Recorded By, (t) = Taken By, (c) = Cosigned By      Initials Name Provider Type    Katie Triplett SLP Speech and Language Pathologist                    Therapy Charges for Today       Code Description Service Date Service Provider Modifiers Qty    [id number]  ST TREATMENT SWALLOW 3  12/24/2024 Katie Isaacs, SLP GN 1                 Katie Isaacs, SLP  12/24/2024

## 2024-12-24 NOTE — PROGRESS NOTES
Psychiatric     Nephrology Progress Note      Patient Name: Nelson Wang  : 1945  MRN: [id number]  Primary Care Physician:  Domingo Bravo MD  Date of admission: 2024    Subjective   Subjective     Interval History:  No new events.  Comfortable, restless.  Hard of hearing.  In bed, sitter at bedside.    Objective   Objective     Vitals:   Temp:  [97.4 °F (36.3 °C)-98.4 °F (36.9 °C)] 98.4 °F (36.9 °C)  Heart Rate:  [] 102  Resp:  [16-18] 18  BP: (110-174)/(60-81) 163/68  Physical Exam:   constitutional: mildly restless, in bed, hard of hearing.     Eyes: sclerae anicteric, no conjunctival injection   HENT: mucous membranes moist.     Neck: Supple, no thyromegaly, no lymphadenopathy, trachea midline, No JVD   Respiratory: Decreased to auscultation bilaterally, nonlabored respirations, some upper airway rhonchi.   Cardiovascular: RRR, no murmurs, rubs, or gallops.   Gastrointestinal: Positive bowel sounds, soft,  nondistended   Musculoskeletal: No edema, no clubbing or cyanosis.  Left upper extremity AV fistula, patent.   Neurologic: moving extremities, answering questions inconsistently   Skin: warm and dry, no rashes, areas of hypopigmentation.    Result Review    Result Reviewed:  I have personally reviewed the results from the time of this admission to 2024 11:39 EST and agree with these findings:  [x]  Laboratory  []  Microbiology  [x]  Radiology  []  EKG/Telemetry   []  Cardiology/Vascular   []  Pathology  [x]  Old records  []  Other:        Lab 24  0453 24  1547 24  0650 24  0444 24  0449 24  1453   SODIUM 133* 134* 130*  --  129* 127*   POTASSIUM 4.4 4.9 4.0  --  4.1 4.1   CHLORIDE 93* 96* 90*  --  90* 93*   CO2 23.9 23.0 26.4  --  28.7 24.3   BUN 68* 62* 68*  --  41* 72*   CREATININE 5.67* 4.89* 4.52*  --  3.11* 5.02*   GLUCOSE 98 118* 146*  --  138* 151*   EGFR 9.6* 11.5* 12.6*  --  19.7* 11.1*   ANION GAP 16.1* 15.0 13.6  --  10.3 9.7    MAGNESIUM  --  2.1 2.1  --  2.1 2.2   PHOSPHORUS 5.3* 5.1*  --  4.5 3.8 6.1*             Assessment & Plan   Assessment / Plan       Active Hospital Problems:  Active Hospital Problems    Diagnosis     **Hypoxia        Assessment and Plan:    - ESRD, was on home dialysis.  Left upper extremity AV fistula for access.  Electrolytes reviewed.  Had dialysis yesterday, next treatment Thursday unless needed sooner.      - Volume overload,  better now.         - Aspiration pneumonia, and possibly recurrent aspiration, per pulmonary.      - Type 2 diabetes with complications.     - Hypotension blood pressure is acceptable now, on ProAmatine and blood pressure stable.     - Anemia of chronic kidney disease, on RONALD as outpatient.  Getting Epogen while here.     - Altered mentation.  Multifactorial.  Per primary.    Discussed with primary and sitter.  Please call with any questions    Will follow.

## 2024-12-24 NOTE — THERAPY TREATMENT NOTE
Acute Care - Physical Therapy Treatment Note  BRIA Sullivan     Patient Name: Nelson Wang  : 1945  MRN: [id number]  Today's Date: 2024      Visit Dx:     ICD-10-CM ICD-9-CM   1. Acute respiratory failure with hypoxia  J96.01 518.81   2. Acute pulmonary edema  J81.0 518.4   3. Difficulty walking  R26.2 719.7   4. Oropharyngeal dysphagia  R13.12 787.22     Patient Active Problem List   Diagnosis    Essential hypertension    Bradycardia, sinus    Anemia due to chronic kidney disease    Hypothyroidism    Idiopathic gout    Proteinuria    Psoriasis    Thrombocytopenia    Type 2 diabetes mellitus without complication    CKD (chronic kidney disease), stage IV    Hyperlipidemia    Monoclonal gammopathy of unknown significance (MGUS)    Stage 5 chronic kidney disease    Chronic gout without tophus    Peritoneal dialysis catheter dysfunction    Medicare annual wellness visit, subsequent    Chronic cough    Peritonitis associated with peritoneal dialysis    Recurrent pneumonia    Acute on chronic respiratory failure with hypoxia    End stage renal disease    Aspiration pneumonitis    Sepsis    Type 2 diabetes mellitus, with long-term current use of insulin    GERD without esophagitis    Immunosuppression due to drug therapy    Bipolar disorder    Chronic respiratory failure with hypoxia    Shortness of breath    Abnormal stress test    ESRD on dialysis    Abnormal chest CT    Encounter for screening for malignant neoplasm of colon    History of colon polyps    Family history of colon cancer    Intractable pain    Abdominal pain    ESRD (end stage renal disease) on dialysis    AMS (altered mental status)    Hallucinations    LUQ pain    Discitis    Metabolic encephalopathy    Aspiration pneumonia    Discitis of lumbar region    Severe malnutrition    Dementia    Unspecified severe protein-calorie malnutrition    Hypoxia     Past Medical History:   Diagnosis Date    Abnormal stress test     Anemia     IRON INFUSIONS  WITH DIALYSIS    Anesthesia     WITH HIP REPLACEMENT DAUGHTER BELIEVES HAD SVT 2000    Ankle pain, right     Anxiety and depression     Arthritis     CKD (chronic kidney disease), stage IV     DIALYSIS [id number] SHILEY IN RIGHT CHEST    Diabetes     GERD (gastroesophageal reflux disease)     Gout     High cholesterol     History of peritoneal dialysis     HL (hearing loss)     Hyperkalemia     Hypertension     Hypothyroidism     Insomnia     Night terrors     Psoriasis     Psoriasis     Sleep apnea     Sleep walking     Thrombocytopenia     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    Vitiligo      Past Surgical History:   Procedure Laterality Date    ABDOMINAL SURGERY      ANKLE SURGERY Right 11/1990    APPENDECTOMY N/A 1954    ARTERIOVENOUS FISTULA/SHUNT SURGERY Left 07/16/2024    Procedure: Creation of left arm arteriovenous fistula;  Surgeon: Moses Mir MD;  Location: [facility] MAIN OR;  Service: Vascular;  Laterality: Left;    BRONCHOSCOPY N/A 03/01/2024    Procedure: BRONCHOSCOPY WITH BAL AND WASHINGS;  Surgeon: Sandra Espinal MD;  Location: [facility] ENDOSCOPY;  Service: Pulmonary;  Laterality: N/A;  PNEUMONIA    BRONCHOSCOPY N/A 08/30/2024    Procedure: BRONCHOSCOPY WITH BALS AND WASHINGS;  Surgeon: Sandra Espinal MD;  Location: [facility] ENDOSCOPY;  Service: Pulmonary;  Laterality: N/A;  MUCOUS PLUGGING    CARDIAC CATHETERIZATION N/A 05/29/2024    Procedure: Left Heart Cath with possible coronary angioplasty;  Surgeon: Stephan Nichols MD;  Location: [facility] CATH INVASIVE LOCATION;  Service: Cardiology;  Laterality: N/A;    COLONOSCOPY N/A 06/2022    [facility]    ENDOSCOPY N/A 10/28/2024    Procedure: ESOPHAGOGASTRODUODENOSCOPY INSERTION OF LIGHTED INSTRUMENT TO VIEW ESOPHAGUS, STOMACH AND SMALL INTESTINE;  Surgeon: Kingsley Peraza MD;  Location: [facility] ENDOSCOPY;  Service: Gastroenterology;  Laterality: N/A;  Gastritis    FRACTURE SURGERY      HIP BIPOLAR REPLACEMENT Right 01/2000     INSERTION HEMODIALYSIS CATHETER Left 04/09/2024    Procedure: HEMODIALYSIS CATHETER INSERTION;  Surgeon: Jose Berry MD;  Location[facility] MAIN OR;  Service: General;  Laterality: Left;    INSERTION HEMODIALYSIS CATHETER N/A 04/12/2024    Procedure: Tunneled hemodialysis catheter insertion;  Surgeon: Enrique Vinson MD;  [facility];  Service: Vascular;  Laterality: N/A;    INSERTION PERITONEAL DIALYSIS CATHETER N/A 03/27/2023    Procedure: LAPAROSCOPIC INSERTION PERITONEAL DIALYSIS CATHETER, LAPAROSCOPIC OMENTOPEXY WITH LYSIS OF ADHESIONS;  Surgeon: Jose Berry MD;  [facility];  Service: General;  Laterality: N/A;    INSERTION PERITONEAL DIALYSIS CATHETER Left 07/23/2023    Procedure: REVISION OF PERITONEAL DIALYSIS CATHETER;  Surgeon: Radha Oreilly MD;  [facility];  Service: General;  Laterality: Left;    JOINT REPLACEMENT      REMOVAL PERITONEAL DIALYSIS CATHETER N/A 04/09/2024    Procedure: REMOVAL PERITONEAL DIALYSIS CATHETER;  Surgeon: Jose Berry MD;  Location: [facility] MAIN OR;  Service: General;  Laterality: N/A;    RENAL BIOPSY Left 07/15/2022    UPPER GASTROINTESTINAL ENDOSCOPY      VASCULAR SURGERY      WRIST SURGERY      UNSURE WHICH SIDE DAUGHTER REPORTS HAD SEVERE  CUT FROM WINDOW AND THEY HAD TO DO RECONSTRUCTIVE SURGERY WITH VESSELS AND NERVES     PT Assessment (Last 12 Hours)       PT Evaluation and Treatment       Row Name 12/24/24 0932          Physical Therapy Time and Intention    Subjective Information no complaints  -SM     Document Type therapy note (daily note)  -SM     Mode of Treatment individual therapy;physical therapy  -SM     Patient Effort good  -SM     Symptoms Noted During/After Treatment fatigue  -SM       Row Name 12/24/24 0932          Cognition    Follows Commands (Cognition) follows one-step commands;increased processing time needed  -SM       Row Name 12/24/24 0932          Sit-Stand  Transfer    Sit-Stand [place] (Transfers) contact guard;nonverbal cues (demo/gesture);verbal cues  -     Assistive Device (Sit-Stand Transfers) walker, front-wheeled  -SM       Row Name 12/24/24 0932          Stand-Sit Transfer    Stand-Sit [place] (Transfers) contact guard;nonverbal cues (demo/gesture);verbal cues  -     Assistive Device (Stand-Sit Transfers) walker, front-wheeled  -SM       Row Name 12/24/24 0932          Gait/Stairs (Locomotion)    Gait/Stairs Locomotion gait/ambulation assistive device  -     [place] Level (Gait) contact guard;nonverbal cues (demo/gesture);verbal cues  -     Assistive Device (Gait) walker, front-wheeled  -     Patient was able to Ambulate yes  -     Distance in Feet (Gait) 200  5 total seated rest breaks  -     Deviations/Abnormal Patterns (Gait) festinating/shuffling;gait speed decreased  -     Bilateral Gait Deviations forward flexed posture;heel strike decreased  -       Row Name 12/24/24 0932          Safety Issues/Impairments Affecting Functional Mobility    Impairments Affecting Function (Mobility) balance;cognition;endurance/activity tolerance;strength  -       Row Name 12/24/24 0932          Balance    Dynamic Standing Balance contact guard;verbal cues;non-verbal cues (demo/gesture)  -     Position/Device Used, Standing Balance walker front-wheeled  -       Row Name             Wound 11/15/24 1945 Right upper chest    Wound - Properties Group Placement Date: 11/15/24  -JR Placement Time: 1945  -JR Side: Right  -JR Orientation: upper  -JR Location: chest  -JR Primary Wound Type: Incision  -JR    Retired Wound - Properties Group Placement Date: 11/15/24  -JR Placement Time: 1945  -JR Side: Right  -JR Orientation: upper  -JR Location: chest  -JR Primary Wound Type: Incision  -JR    Retired Wound - Properties Group Placement Date: 11/15/24  -JR Placement Time: 1945  -JR Side: Right  -JR Orientation: upper  -JR Location: chest  -JR  Primary Wound Type: Incision  -JR    Retired Wound - Properties Group Date first assessed: 11/15/24  -JR Time first assessed: 1945  -JR Side: Right  -JR Location: chest  -JR Primary Wound Type: Incision  -JR      Row Name             Wound 12/05/24 2331 Right lower leg abrasion    Wound - Properties Group Placement Date: 12/05/24  -AW Placement Time: 2331  -AW Side: Right  -AW Orientation: lower  -AW Location: leg  -AW Primary Wound Type: Abrasion  -AW Type: abrasion  -AW Present on Original Admission: Y  -AW    Retired Wound - Properties Group Placement Date: 12/05/24  -AW Placement Time: 2331  -AW Present on Original Admission: Y  -AW Side: Right  -AW Orientation: lower  -AW Location: leg  -AW Primary Wound Type: Abrasion  -AW Type: abrasion  -AW    Retired Wound - Properties Group Placement Date: 12/05/24  -AW Placement Time: 2331  -AW Present on Original Admission: Y  -AW Side: Right  -AW Orientation: lower  -AW Location: leg  -AW Primary Wound Type: Abrasion  -AW Type: abrasion  -AW    Retired Wound - Properties Group Date first assessed: 12/05/24  -AW Time first assessed: 2331  -AW Present on Original Admission: Y  -AW Side: Right  -AW Location: leg  -AW Primary Wound Type: Abrasion  -AW Type: abrasion  -AW      Row Name             Wound 12/06/24 1440 Left lower leg skin tear    Wound - Properties Group Placement Date: 12/06/24  -KE Placement Time: 1440  -KE Side: Left  -KE Orientation: lower  -KE Location: leg  -KE Primary Wound Type: Skin tear  -KE Type: skin tear  -KE    Retired Wound - Properties Group Placement Date: 12/06/24  -KE Placement Time: 1440  -KE Side: Left  -KE Orientation: lower  -KE Location: leg  -KE Primary Wound Type: Skin tear  -KE Type: skin tear  -KE    Retired Wound - Properties Group Placement Date: 12/06/24  -KE Placement Time: 1440  -KE Side: Left  -KE Orientation: lower  -KE Location: leg  -KE Primary Wound Type: Skin tear  -KE Type: skin tear  -KE    Retired Wound - Properties  Group Date first assessed: 12/06/24  -KE Time first assessed: 1440  -KE Side: Left  -KE Location: leg  -KE Primary Wound Type: Skin tear  -KE Type: skin tear  -KE      Row Name 12/24/24 0932          Positioning and Restraints    Pre-Treatment Position sitting in chair/recliner  -SM     Post Treatment Position chair  -SM     In Chair sitting;with family/caregiver;with SLP;with nsg;with other staff  Sitting on [address] [place]'s sitting area per pt request, with nursing, SLP, daughter, and sitter  -SM       Row Name 12/24/24 0932          Progress Summary (PT)    Progress Toward Functional Goals (PT) progress toward functional goals is good  -SM     Daily Progress Summary (PT) Pt presents with improved cognition, awareness, and motivation. Pt shows improved activity endurance and adherance to safety precautions, VC provided as needed. Continue with POC.  -               User Key  (r) = Recorded By, (t) = Taken By, (c) = Cosigned By      Initials Name Provider Type    Stan Esposito, RN Registered Nurse    Jackelin Freitas RN Registered Nurse    Neena Weaver, RN Registered Nurse    Dana Adamson PTA Physical Therapist Assistant                    Physical Therapy Education       Title: PT OT SLP Therapies (In Progress)       Topic: Physical Therapy (In Progress)       Point: Mobility training (Done)       Learning Progress Summary            Patient Acceptance, E,TB, VU by AV at 12/17/2024 1454                      Point: Home exercise program (Not Started)       Learner Progress:  Not documented in this visit.              Point: Body mechanics (Done)       Learning Progress Summary            Patient Acceptance, E,TB, VU by AV at 12/17/2024 1454                      Point: Precautions (Done)       Learning Progress Summary            Patient Acceptance, E,TB, VU by AV at 12/17/2024 1454                                      User Key       Initials Effective Dates Name Provider Type Discipline    MITCHEL 06/11/21 -  Kevin  Kaushik Lea, PT Physical Therapist PT                  PT Recommendation and Plan     Progress Summary (PT)  Progress Toward Functional Goals (PT): progress toward functional goals is good  Daily Progress Summary (PT): Pt presents with improved cognition, awareness, and motivation. Pt shows improved activity endurance and adherance to safety precautions, VC provided as needed. Continue with POC.   Outcome Measures       Row Name 12/24/24 0938 12/22/24 1011          How much help from another person do you currently need...    Turning from your back to your side while in flat bed without using bedrails? 4  -SM 4  -SM     Moving from lying on back to sitting on the side of a flat bed without bedrails? 4  -SM 4  -SM     Moving to and from a bed to a chair (including a wheelchair)? 3  -SM 4  -SM     Standing up from a chair using your arms (e.g., wheelchair, bedside chair)? 4  -SM 4  -SM     Climbing 3-5 steps with a railing? 2  -SM 2  -SM     To walk in hospital room? 3  -SM 3  -SM     AM-PAC 6 Clicks Score (PT) 20  -SM 21  -SM               User Key  (r) = Recorded By, (t) = Taken By, (c) = Cosigned By      Initials Name Provider Type    Dana Adamson PTA Physical Therapist Assistant                     Time Calculation:    PT Charges       Row Name 12/24/24 0932             Time Calculation    PT Received On 12/24/24  -SM         Timed Charges    42345 - Gait Training Minutes  30  -SM      07031 - PT Therapeutic Activity Minutes 10  -SM         Total Minutes    Timed Charges Total Minutes 40  -SM       Total Minutes 40  -SM                User Key  (r) = Recorded By, (t) = Taken By, (c) = Cosigned By      Initials Name Provider Type    Dana Adamson PTA Physical Therapist Assistant                      PT G-Codes  Outcome Measure Options: AM-PAC 6 Clicks Daily Activity (OT), Optimal Instrument  AM-PAC 6 Clicks Score (PT): 20  AM-PAC 6 Clicks Score (OT): 7    aDna Garcia PTA  12/24/2024

## 2024-12-24 NOTE — PROGRESS NOTES
[facility]   Hospitalist Progress Note    Date of admission: 12/5/2024  Patient Name: Nelson Wang  1945  Date: 12/24/2024      Subjective     Chief Complaint   Patient presents with    Shortness of Breath       Interval Followup: varying mental status recently, worse this morning for me, sitter still present.  Apparently ate okay this morning unable to answer ROS questions for me currently      Objective     Vitals:   Temp:  [97.4 °F (36.3 °C)-98.4 °F (36.9 °C)] 98.4 °F (36.9 °C)  Heart Rate:  [] 102  Resp:  [16-18] 18  BP: (110-163)/(66-81) 163/68    Physical Exam  More confused, restless, in bed, still tries to get out of bed intermittently, is asking about who people are that he can see him in the hallway difficult to redirect currently  CTAB on room air no wheezing  RRR no lower extremity pitting edema  Generalized muscle wasting     Result Review:  Vital signs, labs and recent relevant imaging reviewed.      CBC          12/20/2024    06:50 12/22/2024    15:47 12/23/2024    04:53   CBC   WBC 5.56  7.04  6.27    RBC 3.12  3.18  3.03    Hemoglobin 8.4  8.5  8.2    Hematocrit 27.5  27.5  27.1    MCV 88.1  86.5  89.4    MCH 26.9  26.7  27.1    MCHC 30.5  30.9  30.3    RDW 20.3  19.9  20.1    Platelets 184  222  245      CMP          12/20/2024    06:50 12/22/2024    15:47 12/23/2024    04:53   CMP   Glucose 146  118  98    BUN 68  62  68    Creatinine 4.52  4.89  5.67    EGFR 12.6  11.5  9.6    Sodium 130  134  133    Potassium 4.0  4.9  4.4    Chloride 90  96  93    Calcium 9.7  9.8  9.8    Albumin  3.2  2.7    BUN/Creatinine Ratio 15.0  12.7  12.0    Anion Gap 13.6  15.0  16.1          acetaminophen    artificial tears    senna-docusate sodium **AND** polyethylene glycol **AND** [DISCONTINUED] bisacodyl **AND** bisacodyl    dextrose    dextrose    Diclofenac Sodium    glucagon (human recombinant)    heparin (porcine)    heparin (porcine)    ipratropium-albuterol    Lidocaine    ondansetron     Polyvinyl Alcohol-Povidone PF    QUEtiapine    sodium chloride    sodium chloride    sodium chloride    trolamine salicylate    arformoterol, 15 mcg, Nebulization, BID - RT  [Held by provider] atorvastatin, 40 mg, Oral, Daily  budesonide, 0.5 mg, Nebulization, BID - RT  famotidine, 10 mg, Oral, Daily  insulin lispro, 2-7 Units, Subcutaneous, BID AC  levothyroxine, 175 mcg, Oral, Q AM  melatonin, 2.5 mg, Oral, Nightly  midodrine, 10 mg, Oral, Once per day on Monday Wednesday Friday  mineral oil-hydrophilic petrolatum, 1 Application, Topical, Daily  QUEtiapine, 25 mg, Oral, Nightly  sodium chloride, 10 mL, Intravenous, Q12H        XR Abdomen KUB    Result Date: 12/21/2024  Impression: 1.Enteric tube terminates in the left upper quadrant, with tip likely in the mid stomach. 2.Calcification at the right renal pelvis, as before. Electronically Signed: Enrique Villavicencio  12/21/2024 12:22 PM EST  Workstation ID: [id number]    XR Chest 1 View    Result Date: 12/20/2024  Feeding tube extends well below the diaphragm. The tip is not seen. Electronically Signed: Cristian Tiwari MD  12/20/2024 10:38 PM EST  Workstation ID: [id number]    XR Chest 1 View    Result Date: 12/19/2024  No significant interval change is appreciated radiographically since the prior study from 12/18/2024 at 5:21 a.m.   Portions of this note were completed with a voice recognition program.  Electronically Signed ByCorie Hogan MD On:12/19/2024 5:49 AM      XR Chest 1 View    Result Date: 12/18/2024  Increased bilateral infiltrates are seen. The findings may represent worsening infectious multifocal pneumonia. Increased pulmonary edema cannot be excluded. Aspiration pneumonia cannot be excluded. Please see above comments for further detail.   Portions of this note were completed with a voice recognition program.  Electronically Signed ByCorie Hogan MD On:12/18/2024 6:07 AM       Assessment / Plan     Summary: 79 y.o. ESRD on dialysis, type 2 diabetes,  dementia, discitis on vancomycin, A-fib, restrictive lung disease who presents to the emergency department for evaluation of hypoxia and shortness of breath. Patient was started on supplemental oxygen to maintain his oxygen saturations. He was given a dose of Bumex overnight. His chest x-ray was consistent with bilateral pleural effusions. Pulmonology was consulted for the bilateral pleural effusions and and nephrology consulted for possible volume overload. Pulmonology was performing a thoracentesis this morning. Thoracentesis was showing bloody fluid. Patient suffered CODE BLUE. Likely bleeding into his airways causing hypoxic arrest. He received CPR for 6 minutes and achieved ROSC. He was transferred to the ICU, intubated and on blood pressure support. Bronchoscopy was showing blood clots in his airways. Patient was started on CRRT. Patient's medical status is tenuous. He is currently on 1 pressor. CRRT is removing fluid at a slow rate. Patient started on micafungin on 12/7/2024. Patient extubated on 12/8/2024. Patient on high flow nasal cannula 30 L 25%. Slowly weaning down on supplemental oxygen, breathing more comfortably. Poor mental status still waxing and waning suspect underlying dementia with history of alcohol abuse, mixed with hospital delirium and hyperactive delirium.  Patient continues on hemodialysis with midodrine on dialysis days has required extra Seroquel and occasional as needed medications for sedation to allow him to tolerate hemodialysis.  Working on mentation and trying to get back home ultimately.    Patient has completed IV Vanco and micafungin course while in the hospital.    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  Will use Seroquel as needed if needed on dialysis days and continue to redirect  Continue midodrine 10mg on HD days, cont daily weights, on RONALD outpt, seroquel prn dose on HD days.  Blood pressure is doing okay on nondialysis days   Off oxygen currently, better after had further volume remove.  Continue to monitor closely for further aspiration, continue with aspiration cautions and adjusted diet per speech.    Spot checking labs, trying to minimize delirium/agitation  discussed with ID previously, has completed IV vancomycin course for staph epi discitis, apparently Dr. Sigala (ID in [place]) saw him and 6 week course would be completed 12/14.  iv micafungin 14d course completed 12/22  EEG from prior hospitalization 11/15 and 12/10 without seizure activity noted on limited study.  Keppra has been dc'd given concern of contribution to mentation.  Will continue to monitor without this.    12/1 ct head had a small hematoma, no acute abnormality, has generalized parenchymal volume loss noted, suspicious for underlying component of dementia,  along with uremia/hospital delirium contributing to current issues with mentation   As needed pain medication, lidocaine patch, Voltaren try to avoid sedating meds  Continue Synthroid  Continue SSI and monitor blood glucose  Continue famotidine  Continue PT/OT  Continue supportive care discussions with patient and  family, guarded long-term prognosis  Continue hospitalization at current level of care    Dispo: Ultimately goal for going home with family support once medically stable.   D/w SW    VTE Prophylaxis:  Pharmacologic & mechanical VTE prophylaxis orders are present.      Level Of Support Discussed With: Next of Kin (If No Surrogate)  Code Status (Patient has no pulse and is not breathing): No CPR (Do Not Attempt to Resuscitate)  Medical Interventions (Patient has pulse or is breathing): Full Support

## 2024-12-24 NOTE — CONSULTS
Discharge Planning Assessment  BRIA Sullivan     Patient Name: Nelson Wang  MRN: [id number]  Today's Date: 12/24/2024    Admit Date: 12/5/2024    Continued Care and Services - Admitted Since 12/5/2024       Home Medical Care       Service Provider Request Status Services Address Phone Fax Patient Preferred    [facility] Accepted -- [address] [id number] [phone] --           RACHELLE Perez

## 2024-12-24 NOTE — PLAN OF CARE
Goal Outcome Evaluation:  Plan of Care Reviewed With: patient, family        Progress: no change  Outcome Evaluation: denies pain/discomfort this shift. continues with SW at bedside d/t impulsiveness. up in chair, wheelchair, bed throughout shift. no new issues/needs noted at this time.

## 2024-12-24 NOTE — PLAN OF CARE
Goal Outcome Evaluation:  Plan of Care Reviewed With: patient, child        Progress: no change  Outcome Evaluation: Appeared restless during first part of shift, daughter in room at bedside providing help and support to father, and communcation needs. Patient has a safety sitter at bedside throughout shift, appears to be sleeping in bed throughout shift without interruption to ensure patient sleeps through night. Vitals for midnight were held due to patient not being able to sleep during hospitalization the previous shifts, and increased confusion being noted, medication given to help patient sleep, tolerating well.

## 2024-12-25 LAB
ANION GAP SERPL CALCULATED.3IONS-SCNC: 12.6 MMOL/L (ref 5–15)
BUN SERPL-MCNC: 41 MG/DL (ref 8–23)
BUN/CREAT SERPL: 7.7 (ref 7–25)
CALCIUM SPEC-SCNC: 10.3 MG/DL (ref 8.6–10.5)
CHLORIDE SERPL-SCNC: 96 MMOL/L (ref 98–107)
CO2 SERPL-SCNC: 26.4 MMOL/L (ref 22–29)
CREAT SERPL-MCNC: 5.33 MG/DL (ref 0.76–1.27)
EGFRCR SERPLBLD CKD-EPI 2021: 10.3 ML/MIN/1.73
GLUCOSE BLDC GLUCOMTR-MCNC: 102 MG/DL (ref 70–99)
GLUCOSE BLDC GLUCOMTR-MCNC: 105 MG/DL (ref 70–99)
GLUCOSE SERPL-MCNC: 98 MG/DL (ref 65–99)
POTASSIUM SERPL-SCNC: 4.5 MMOL/L (ref 3.5–5.2)
SODIUM SERPL-SCNC: 135 MMOL/L (ref 136–145)
WHOLE BLOOD HOLD SPECIMEN: NORMAL

## 2024-12-25 PROCEDURE — 80048 BASIC METABOLIC PNL TOTAL CA: CPT | Performed by: INTERNAL MEDICINE

## 2024-12-25 PROCEDURE — 97116 GAIT TRAINING THERAPY: CPT

## 2024-12-25 PROCEDURE — 82948 REAGENT STRIP/BLOOD GLUCOSE: CPT

## 2024-12-25 PROCEDURE — 99232 SBSQ HOSP IP/OBS MODERATE 35: CPT | Performed by: STUDENT IN AN ORGANIZED HEALTH CARE EDUCATION/TRAINING PROGRAM

## 2024-12-25 RX ADMIN — MIDODRINE HYDROCHLORIDE 10 MG: 10 TABLET ORAL at 09:24

## 2024-12-25 RX ADMIN — BUDESONIDE 0.5 MG: 0.5 INHALANT ORAL at 08:05

## 2024-12-25 RX ADMIN — WHITE PETROLATUM 1 APPLICATION: 1.75 OINTMENT TOPICAL at 09:31

## 2024-12-25 RX ADMIN — ARFORMOTEROL TARTRATE 15 MCG: 15 SOLUTION RESPIRATORY (INHALATION) at 08:05

## 2024-12-25 RX ADMIN — ACETAMINOPHEN 650 MG: 325 TABLET ORAL at 09:24

## 2024-12-25 RX ADMIN — LEVOTHYROXINE SODIUM 175 MCG: 0.03 TABLET ORAL at 06:46

## 2024-12-25 RX ADMIN — Medication 2.5 MG: at 20:08

## 2024-12-25 RX ADMIN — QUETIAPINE FUMARATE 25 MG: 25 TABLET ORAL at 20:07

## 2024-12-25 RX ADMIN — FAMOTIDINE 10 MG: 20 TABLET, FILM COATED ORAL at 09:24

## 2024-12-25 RX ADMIN — ACETAMINOPHEN 650 MG: 325 TABLET ORAL at 20:07

## 2024-12-25 NOTE — PLAN OF CARE
Goal Outcome Evaluation:  Plan of Care Reviewed With: patient, family        Progress: improving  Outcome Evaluation: denies pain/discomfort this shift. continues with SW at bedside d/t impulsiveness. up in chair, wheelchair, bed throughout shift. plan for HD tomorrow. no new issues/needs noted at this time.

## 2024-12-25 NOTE — PLAN OF CARE
Goal Outcome Evaluation:  Plan of Care Reviewed With: patient, family        Progress: improving  Outcome Evaluation: Patient slept intermittently overnight, restless at times but easily redirectable. Showered with staff assist. Ambulating in halls with walker, gait belt and staff assist. Safety watch remains at bedside

## 2024-12-25 NOTE — PROGRESS NOTES
[facility]   Hospitalist Progress Note  Date: 2024  Patient Name: Nelson Wang  : 1945  MRN: [id number]  Date of admission: 2024  Room/Bed: [facility]      Subjective   Subjective     Chief Complaint: Shortness of breath    Summary:Nelson Wang is a 79 y.o. male ESRD on dialysis, type 2 diabetes, dementia, discitis on vancomycin, A-fib, restrictive lung disease who presents to the emergency department for evaluation of hypoxia and shortness of breath. Patient was started on supplemental oxygen to maintain his oxygen saturations. He was given a dose of Bumex overnight. His chest x-ray was consistent with bilateral pleural effusions. Pulmonology was consulted for the bilateral pleural effusions and and nephrology consulted for possible volume overload. Pulmonology was performing a thoracentesis this morning. Thoracentesis was showing bloody fluid. Patient suffered CODE BLUE. Likely bleeding into his airways causing hypoxic arrest. He received CPR for 6 minutes and achieved ROSC. He was transferred to the ICU, intubated and on blood pressure support. Bronchoscopy was showing blood clots in his airways. Patient was started on CRRT. Patient's medical status is tenuous. He is currently on 1 pressor. CRRT is removing fluid at a slow rate. Patient started on micafungin on 2024. Patient extubated on 2024. Patient on high flow nasal cannula 30 L 25%. Slowly weaning down on supplemental oxygen, breathing more comfortably. Poor mental status still waxing and waning suspect underlying dementia with history of alcohol abuse, mixed with hospital delirium and hyperactive delirium.  Patient continues on hemodialysis with midodrine on dialysis days has required extra Seroquel and occasional as needed medications for sedation to allow him to tolerate hemodialysis.  Working on mentation and trying to get back home ultimately.     Patient has completed IV Vanco and micafungin course while in the  hospital.    Interval Followup: No acute overnight events.  No acute distress.  Patient was walking with physical therapy using a walker and seemed to be doing well.  I followed him into his room and he sat down, nursing at bedside.  He will look at me but not really respond to any questions.  Nursing reports that he was able to feed himself a little bit today.    Review of Systems    All systems reviewed and negative except for what is outlined above.      Objective   Objective     Vitals:   Temp:  [97.5 °F (36.4 °C)-99 °F (37.2 °C)] 97.9 °F (36.6 °C)  Heart Rate:  [66-99] 86  Resp:  [16-18] 18  BP: (140-165)/(52-97) 140/68    Physical Exam   Gen: NAD, Alert   Cards: RRR  Pulm: No respiratory distress  Abd: soft, nondistended  Extremities: no pitting edema    Result Review    Result Review:  I have personally reviewed these results:  [x]  Laboratory      Lab 12/23/24  0453 12/22/24  1547 12/20/24  0650 12/19/24  0449 12/18/24  1453   WBC 6.27 7.04 5.56 5.53 5.62   HEMOGLOBIN 8.2* 8.5* 8.4* 8.8* 8.3*   HEMATOCRIT 27.1* 27.5* 27.5* 29.2* 27.5*   PLATELETS 245 222 184 169 153   NEUTROS ABS  --  3.80 3.14 2.90 3.40   IMMATURE GRANS (ABS)  --  0.13* 0.02 0.03 0.03   LYMPHS ABS  --  1.60 1.24 1.30 1.17   MONOS ABS  --  0.90 0.74 0.76 0.55   EOS ABS  --  0.54* 0.39 0.50* 0.44*   MCV 89.4 86.5 88.1 89.3 89.9   PROCALCITONIN  --  0.62*  --   --  0.61*         Lab 12/25/24  0810 12/23/24  0453 12/22/24  1547 12/20/24  0650 12/20/24  0444 12/19/24  0449   SODIUM 135* 133* 134* 130*  --  129*   POTASSIUM 4.5 4.4 4.9 4.0  --  4.1   CHLORIDE 96* 93* 96* 90*  --  90*   CO2 26.4 23.9 23.0 26.4  --  28.7   ANION GAP 12.6 16.1* 15.0 13.6  --  10.3   BUN 41* 68* 62* 68*  --  41*   CREATININE 5.33* 5.67* 4.89* 4.52*  --  3.11*   EGFR 10.3* 9.6* 11.5* 12.6*  --  19.7*   GLUCOSE 98 98 118* 146*  --  138*   CALCIUM 10.3 9.8 9.8 9.7  --  9.4   MAGNESIUM  --   --  2.1 2.1  --  2.1   PHOSPHORUS  --  5.3* 5.1*  --  4.5 3.8         Lab  12/23/24  0453 12/22/24  1547   ALBUMIN 2.7* 3.2*         Lab 12/18/24  1453   PROBNP 10,685.0*                 Brief Urine Lab Results  (Last result in the past 365 days)        Color   Clarity   Blood   Leuk Est   Nitrite   Protein   CREAT   Urine HCG        10/27/24 1417 Yellow   Cloudy   Negative   Trace   Negative   >=300 mg/dL (3+)                 [x]  Microbiology   Microbiology Results (last 10 days)       Procedure Component Value - Date/Time    Blood Culture - Blood, Hand, Right [[id number]]  (Normal) Collected: 12/23/24 0453    Lab Status: Preliminary result Specimen: Blood from Hand, Right Updated: 12/25/24 0530     Blood Culture No growth at 2 days    Narrative:      Less than seven (7) mL's of blood was collected.  Insufficient quantity may yield false negative results.    Blood Culture - Blood, Arm, Right [[id number]]  (Normal) Collected: 12/23/24 0453    Lab Status: Preliminary result Specimen: Blood from Arm, Right Updated: 12/25/24 0530     Blood Culture No growth at 2 days    Narrative:      Less than seven (7) mL's of blood was collected.  Insufficient quantity may yield false negative results.          [x]  Radiology  XR Abdomen KUB    Result Date: 12/21/2024  Impression: 1.Enteric tube terminates in the left upper quadrant, with tip likely in the mid stomach. 2.Calcification at the right renal pelvis, as before. Electronically Signed: Enrique Villavicencio  12/21/2024 12:22 PM EST  Workstation ID: [id number]    XR Chest 1 View    Result Date: 12/20/2024  Feeding tube extends well below the diaphragm. The tip is not seen. Electronically Signed: Cristian Tiwari MD  12/20/2024 10:38 PM EST  Workstation ID: [id number]    XR Chest 1 View    Result Date: 12/19/2024  No significant interval change is appreciated radiographically since the prior study from 12/18/2024 at 5:21 a.m.   Portions of this note were completed with a voice recognition program.  Electronically Signed By-Stan Hogan MD On:12/19/2024 5:49 AM       XR Chest 1 View    Result Date: 12/18/2024  Increased bilateral infiltrates are seen. The findings may represent worsening infectious multifocal pneumonia. Increased pulmonary edema cannot be excluded. Aspiration pneumonia cannot be excluded. Please see above comments for further detail.   Portions of this note were completed with a voice recognition program.  Electronically Signed By-Stan Hogan MD On:12/18/2024 6:07 AM     []  EKG/Telemetry   []  Cardiology/Vascular   []  Pathology  []  Old records  []  Other:    Assessment & Plan   Assessment / Plan     Assessment/Plan (clinically significant if listed here)  Cardiac arrest secondary to hypoxemia in setting of thoracentesis  Acute hypoxic/hypercapnic respiratory failure requiring mechanical ventilation  Acute metabolic encephalopathy/altered mental status, initial concern for seizures, EEG negative x 2  Acute on chronic diastolic heart failure  Acute cardiogenic pulmonary edema  Bilateral pleural effusions  Hemoptysis  Candidemia s/p iv micafungin course completed 12/22/24  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  Will use Seroquel as needed if needed on dialysis days and continue to redirect  Continue midodrine 10mg on HD days, cont daily weights, on RONALD outpt, seroquel prn dose on HD days.  Blood pressure is doing okay on nondialysis days   Off oxygen currently, better after had further volume remove.  Continue to monitor closely for further aspiration, continue with aspiration cautions and adjusted diet per speech.    Spot checking labs, trying to minimize delirium/agitation  discussed  with ID previously, has completed IV vancomycin course for staph epi discitis, apparently Dr. Sigala (ID in [place]) saw him and 6 week course would be completed 12/14.  iv micafungin 14d course completed 12/22  EEG from prior hospitalization 11/15 and 12/10 without seizure activity noted on limited study.  Keppra has been dc'd given concern of contribution to mentation.  Will continue to monitor without this.    12/1 ct head had a small hematoma, no acute abnormality, has generalized parenchymal volume loss noted, suspicious for underlying component of dementia,  along with uremia/hospital delirium contributing to current issues with mentation   As needed pain medication, lidocaine patch, Voltaren try to avoid sedating meds  Continue Synthroid  Continue SSI and monitor blood glucose  Continue famotidine  Continue PT/OT  Continue supportive care discussions with patient and family, guarded long-term prognosis  Continue hospitalization at current level of care     Dispo: Ultimately goal for going home with family support once medically stable.  Family would like him to stay until Saturday given that he is going to miss dialysis today and will need volume removal.     Discussed with RN.    VTE Prophylaxis:  Pharmacologic & mechanical VTE prophylaxis orders are present.        CODE STATUS:   Level Of Support Discussed With: Next of Kin (If No Surrogate)  Code Status (Patient has no pulse and is not breathing): No CPR (Do Not Attempt to Resuscitate)  Medical Interventions (Patient has pulse or is breathing): Full Support      Electronically signed by Marquita Freitas DO, 12/25/2024, 14:19 EST.

## 2024-12-25 NOTE — THERAPY TREATMENT NOTE
Acute Care - Physical Therapy Treatment Note  BRIA [facility]     Patient Name: Nelson Wang  : 1945  MRN: [id number]  Today's Date: 2024      Visit Dx:     ICD-10-CM ICD-9-CM   1. Acute respiratory failure with hypoxia  J96.01 518.81   2. Acute pulmonary edema  J81.0 518.4   3. Difficulty walking  R26.2 719.7   4. Oropharyngeal dysphagia  R13.12 787.22     Patient Active Problem List   Diagnosis    Essential hypertension    Bradycardia, sinus    Anemia due to chronic kidney disease    Hypothyroidism    Idiopathic gout    Proteinuria    Psoriasis    Thrombocytopenia    Type 2 diabetes mellitus without complication    CKD (chronic kidney disease), stage IV    Hyperlipidemia    Monoclonal gammopathy of unknown significance (MGUS)    Stage 5 chronic kidney disease    Chronic gout without tophus    Peritoneal dialysis catheter dysfunction    Medicare annual wellness visit, subsequent    Chronic cough    Peritonitis associated with peritoneal dialysis    Recurrent pneumonia    Acute on chronic respiratory failure with hypoxia    End stage renal disease    Aspiration pneumonitis    Sepsis    Type 2 diabetes mellitus, with long-term current use of insulin    GERD without esophagitis    Immunosuppression due to drug therapy    Bipolar disorder    Chronic respiratory failure with hypoxia    Shortness of breath    Abnormal stress test    ESRD on dialysis    Abnormal chest CT    Encounter for screening for malignant neoplasm of colon    History of colon polyps    Family history of colon cancer    Intractable pain    Abdominal pain    ESRD (end stage renal disease) on dialysis    AMS (altered mental status)    Hallucinations    LUQ pain    Discitis    Metabolic encephalopathy    Aspiration pneumonia    Discitis of lumbar region    Severe malnutrition    Dementia    Unspecified severe protein-calorie malnutrition    Hypoxia     Past Medical History:   Diagnosis Date    Abnormal stress test     Anemia     IRON INFUSIONS  WITH DIALYSIS    Anesthesia     WITH HIP REPLACEMENT DAUGHTER BELIEVES HAD SVT 2000    Ankle pain, right     Anxiety and depression     Arthritis     CKD (chronic kidney disease), stage IV     DIALYSIS [id number] SHILEY IN RIGHT CHEST    Diabetes     GERD (gastroesophageal reflux disease)     Gout     High cholesterol     History of peritoneal dialysis     HL (hearing loss)     Hyperkalemia     Hypertension     Hypothyroidism     Insomnia     Night terrors     Psoriasis     Psoriasis     Sleep apnea     Sleep walking     Thrombocytopenia     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    Vitiligo      Past Surgical History:   Procedure Laterality Date    ABDOMINAL SURGERY      ANKLE SURGERY Right 11/1990    APPENDECTOMY N/A 1954    ARTERIOVENOUS FISTULA/SHUNT SURGERY Left 07/16/2024    Procedure: Creation of left arm arteriovenous fistula;  Surgeon: Moses Mir MD;  Location: [facility] MAIN OR;  Service: Vascular;  Laterality: Left;    BRONCHOSCOPY N/A 03/01/2024    Procedure: BRONCHOSCOPY WITH BAL AND WASHINGS;  Surgeon: Sandra Espinal MD;  Location: [facility] ENDOSCOPY;  Service: Pulmonary;  Laterality: N/A;  PNEUMONIA    BRONCHOSCOPY N/A 08/30/2024    Procedure: BRONCHOSCOPY WITH BALS AND WASHINGS;  Surgeon: Sandra Espinal MD;  Location: [facility] ENDOSCOPY;  Service: Pulmonary;  Laterality: N/A;  MUCOUS PLUGGING    CARDIAC CATHETERIZATION N/A 05/29/2024    Procedure: Left Heart Cath with possible coronary angioplasty;  Surgeon: Stephan Nichols MD;  Location: [facility] CATH INVASIVE LOCATION;  Service: Cardiology;  Laterality: N/A;    COLONOSCOPY N/A 06/2022    [facility]    ENDOSCOPY N/A 10/28/2024    Procedure: ESOPHAGOGASTRODUODENOSCOPY INSERTION OF LIGHTED INSTRUMENT TO VIEW ESOPHAGUS, STOMACH AND SMALL INTESTINE;  Surgeon: Kingsley Peraza MD;  Location: [facility] ENDOSCOPY;  Service: Gastroenterology;  Laterality: N/A;  Gastritis    FRACTURE SURGERY      HIP BIPOLAR REPLACEMENT Right 01/2000     INSERTION HEMODIALYSIS CATHETER Left 04/09/2024    Procedure: HEMODIALYSIS CATHETER INSERTION;  Surgeon: Jose Berry MD;  Location[facility] MAIN OR;  Service: General;  Laterality: Left;    INSERTION HEMODIALYSIS CATHETER N/A 04/12/2024    Procedure: Tunneled hemodialysis catheter insertion;  Surgeon: Enrique Vinson MD;  Location: [facility] MAIN OR;  Service: Vascular;  Laterality: N/A;    INSERTION PERITONEAL DIALYSIS CATHETER N/A 03/27/2023    Procedure: LAPAROSCOPIC INSERTION PERITONEAL DIALYSIS CATHETER, LAPAROSCOPIC OMENTOPEXY WITH LYSIS OF ADHESIONS;  Surgeon: Jose Berry MD;  Location: [facility] MAIN OR;  Service: General;  Laterality: N/A;    INSERTION PERITONEAL DIALYSIS CATHETER Left 07/23/2023    Procedure: REVISION OF PERITONEAL DIALYSIS CATHETER;  Surgeon: Radha Oreilly MD;  Location: [facility] MAIN OR;  Service: General;  Laterality: Left;    JOINT REPLACEMENT      REMOVAL PERITONEAL DIALYSIS CATHETER N/A 04/09/2024    Procedure: REMOVAL PERITONEAL DIALYSIS CATHETER;  Surgeon: Jose Berry MD;  Location: [facility] MAIN OR;  Service: General;  Laterality: N/A;    RENAL BIOPSY Left 07/15/2022    UPPER GASTROINTESTINAL ENDOSCOPY      VASCULAR SURGERY      WRIST SURGERY      UNSURE WHICH SIDE DAUGHTER REPORTS HAD SEVERE  CUT FROM WINDOW AND THEY HAD TO DO RECONSTRUCTIVE SURGERY WITH VESSELS AND NERVES     PT Assessment (Last 12 Hours)       PT Evaluation and Treatment       Row Name 12/25/24 1036          Physical Therapy Time and Intention    Subjective Information fatigue  -SM     Document Type therapy note (daily note)  -SM     Mode of Treatment individual therapy;physical therapy  -SM     Patient Effort good  -SM     Symptoms Noted During/After Treatment fatigue  -SM       Row Name 12/25/24 1036          Cognition    Affect/Mental Status (Cognition) low arousal/lethargic  -SM     Follows Commands (Cognition) follows one-step commands;increased processing time needed   Increased confusion due to lack of sleep per sitter  -       Row Name 12/25/24 1036          Sit-Stand Transfer    Sit-Stand [place] (Transfers) nonverbal cues (demo/gesture);verbal cues;minimum assist (75% patient effort)  -     Assistive Device (Sit-Stand Transfers) walker front-wheeled  -       Row Name 12/25/24 1036          Stand-Sit Transfer    Stand-Sit [place] (Transfers) minimum assist (75% patient effort);nonverbal cues (demo/gesture);contact guard  -     Assistive Device (Stand-Sit Transfers) walker, front-wheeled  -       Row Name 12/25/24 1036          Gait/Stairs (Locomotion)    Gait/Stairs Locomotion gait/ambulation assistive device  -     [place] Level (Gait) minimum assist (75% patient effort);nonverbal cues (demo/gesture);verbal cues;1 person to manage equipment  -     Assistive Device (Gait) walker, front-wheeled  -     Patient was able to Ambulate yes  -     Distance in Feet (Gait) 100  1 seated rest break  -     Deviations/Abnormal Patterns (Gait) gait speed decreased;[place] decreased;base of support, wide  -     Bilateral Gait Deviations forward flexed posture;heel strike decreased  -     Left Sided Gait Deviations leans left  -       Row Name 12/25/24 1036          Safety Issues/Impairments Affecting Functional Mobility    Impairments Affecting Function (Mobility) balance;cognition;endurance/activity tolerance;strength  -     Cognitive Impairments, Mobility Safety/Performance attention;impulsivity  -       Row Name 12/25/24 1036          Balance    Dynamic Standing Balance minimal assist;verbal cues;non-verbal cues (demo/gesture)  -     Position/Device Used, Standing Balance walker, front-wheeled  -       Row Name             Wound 11/15/24 1945 Right upper chest    Wound - Properties Group Placement Date: 11/15/24  -JR Placement Time: 1945  -JR Side: Right  -JR Orientation: upper  -JR Location: chest  -JR Primary Wound Type: Incision  -JR     Retired Wound - Properties Group Placement Date: 11/15/24  -JR Placement Time: 1945 -JR Side: Right  -JR Orientation: upper  -JR Location: chest  -JR Primary Wound Type: Incision  -JR    Retired Wound - Properties Group Placement Date: 11/15/24  -JR Placement Time: 1945 -JR Side: Right  -JR Orientation: upper  -JR Location: chest  -JR Primary Wound Type: Incision  -JR    Retired Wound - Properties Group Date first assessed: 11/15/24  -JR Time first assessed: 1945 -JR Side: Right  -JR Location: chest  -JR Primary Wound Type: Incision  -JR      Row Name             Wound 12/05/24 2331 Right lower leg abrasion    Wound - Properties Group Placement Date: 12/05/24  -AW Placement Time: 2331  -AW Side: Right  -AW Orientation: lower  -AW Location: leg  -AW Primary Wound Type: Abrasion  -AW Type: abrasion  -AW Present on Original Admission: Y  -AW    Retired Wound - Properties Group Placement Date: 12/05/24  -AW Placement Time: 2331  -AW Present on Original Admission: Y  -AW Side: Right  -AW Orientation: lower  -AW Location: leg  -AW Primary Wound Type: Abrasion  -AW Type: abrasion  -AW    Retired Wound - Properties Group Placement Date: 12/05/24  -AW Placement Time: 2331  -AW Present on Original Admission: Y  -AW Side: Right  -AW Orientation: lower  -AW Location: leg  -AW Primary Wound Type: Abrasion  -AW Type: abrasion  -AW    Retired Wound - Properties Group Date first assessed: 12/05/24  -AW Time first assessed: 2331  -AW Present on Original Admission: Y  -AW Side: Right  -AW Location: leg  -AW Primary Wound Type: Abrasion  -AW Type: abrasion  -AW      Row Name             Wound 12/06/24 1440 Left lower leg skin tear    Wound - Properties Group Placement Date: 12/06/24  -KE Placement Time: 1440  -KE Side: Left  -KE Orientation: lower  -KE Location: leg  -KE Primary Wound Type: Skin tear  -KE Type: skin tear  -KE    Retired Wound - Properties Group Placement Date: 12/06/24  -KE Placement Time: 1440  -KE Side: Left  -KE  Orientation: lower  -KE Location: leg  -KE Primary Wound Type: Skin tear  -KE Type: skin tear  -KE    Retired Wound - Properties Group Placement Date: 12/06/24  -KE Placement Time: 1440  -KE Side: Left  -KE Orientation: lower  -KE Location: leg  -KE Primary Wound Type: Skin tear  -KE Type: skin tear  -KE    Retired Wound - Properties Group Date first assessed: 12/06/24  -KE Time first assessed: 1440  -KE Side: Left  -KE Location: leg  -KE Primary Wound Type: Skin tear  -KE Type: skin tear  -KE      Row Name 12/25/24 1036          Positioning and Restraints    Pre-Treatment Position sitting in chair/recliner  -SM     Post Treatment Position chair  -SM     In Chair sitting;call light within reach;encouraged to call for assist;with other staff  with PA and sitter  -SM       Row Name 12/25/24 1036          Progress Summary (PT)    Progress Toward Functional Goals (PT) progress toward functional goals is fair  -SM               User Key  (r) = Recorded By, (t) = Taken By, (c) = Cosigned By      Initials Name Provider Type    Stan Esposito, RN Registered Nurse    Jackelin Freitas, RN Registered Nurse    Neena Weaver, RN Registered Nurse    Dana Adamson PTA Physical Therapist Assistant                    Physical Therapy Education       Title: PT OT SLP Therapies (In Progress)       Topic: Physical Therapy (In Progress)       Point: Mobility training (Done)       Learning Progress Summary            Patient Acceptance, E,TB, VU by AV at 12/17/2024 1454                      Point: Home exercise program (Not Started)       Learner Progress:  Not documented in this visit.              Point: Body mechanics (Done)       Learning Progress Summary            Patient Acceptance, E,TB, VU by AV at 12/17/2024 1454                      Point: Precautions (Done)       Learning Progress Summary            Patient Acceptance, E,TB, VU by AV at 12/17/2024 1454                                      User Key       Initials  Effective Dates Name Provider Type Discipline    AV 06/11/21 -  Kaushik Chavez, PT Physical Therapist PT                  PT Recommendation and Plan     Progress Summary (PT)  Progress Toward Functional Goals (PT): progress toward functional goals is fair  Daily Progress Summary (PT): Pt presents with improved cognition, awareness, and motivation. Pt shows improved activity endurance and adherance to safety precautions, VC provided as needed. Continue with POC.   Outcome Measures       Row Name 12/25/24 1040 12/24/24 0938          How much help from another person do you currently need...    Turning from your back to your side while in flat bed without using bedrails? 4  -SM 4  -SM     Moving from lying on back to sitting on the side of a flat bed without bedrails? 4  -SM 4  -SM     Moving to and from a bed to a chair (including a wheelchair)? 3  -SM 3  -SM     Standing up from a chair using your arms (e.g., wheelchair, bedside chair)? 3  -SM 4  -SM     Climbing 3-5 steps with a railing? 2  -SM 2  -SM     To walk in hospital room? 3  -SM 3  -SM     AM-PAC 6 Clicks Score (PT) 19  -SM 20  -SM               User Key  (r) = Recorded By, (t) = Taken By, (c) = Cosigned By      Initials Name Provider Type     Dana Garcia PTA Physical Therapist Assistant                     Time Calculation:    PT Charges       Row Name 12/25/24 1036             Time Calculation    PT Received On 12/25/24  -SM         Timed Charges    96123 - Gait Training Minutes  30  -SM         Total Minutes    Timed Charges Total Minutes 30  -SM       Total Minutes 30  -SM                User Key  (r) = Recorded By, (t) = Taken By, (c) = Cosigned By      Initials Name Provider Type    Dana Adamson PTA Physical Therapist Assistant                  Therapy Charges for Today       Code Description Service Date Service Provider Modifiers Qty    24787846045 HC GAIT TRAINING EA 15 MIN 12/24/2024 Dana Garcia PTA GP 2    [id number] HC PT  THERAPEUTIC ACT EA 15 MIN 12/24/2024 Dana Garcia, PTA GP 1            PT G-Codes  Outcome Measure Options: AM-PAC 6 Clicks Daily Activity (OT), Optimal Instrument  AM-PAC 6 Clicks Score (PT): 19  AM-PAC 6 Clicks Score (OT): 7    Dana Garcia PTA  12/25/2024

## 2024-12-26 LAB
GLUCOSE BLDC GLUCOMTR-MCNC: 109 MG/DL (ref 70–99)
GLUCOSE BLDC GLUCOMTR-MCNC: 88 MG/DL (ref 70–99)

## 2024-12-26 PROCEDURE — 82948 REAGENT STRIP/BLOOD GLUCOSE: CPT | Performed by: INTERNAL MEDICINE

## 2024-12-26 PROCEDURE — 99232 SBSQ HOSP IP/OBS MODERATE 35: CPT | Performed by: INTERNAL MEDICINE

## 2024-12-26 RX ADMIN — FAMOTIDINE 10 MG: 20 TABLET, FILM COATED ORAL at 14:47

## 2024-12-26 RX ADMIN — Medication 2.5 MG: at 20:49

## 2024-12-26 RX ADMIN — LEVOTHYROXINE SODIUM 175 MCG: 0.03 TABLET ORAL at 07:31

## 2024-12-26 RX ADMIN — QUETIAPINE FUMARATE 12.5 MG: 25 TABLET ORAL at 07:42

## 2024-12-26 RX ADMIN — QUETIAPINE FUMARATE 25 MG: 25 TABLET ORAL at 20:49

## 2024-12-26 RX ADMIN — WHITE PETROLATUM 1 APPLICATION: 1.75 OINTMENT TOPICAL at 14:47

## 2024-12-26 NOTE — SIGNIFICANT NOTE
12/26/24 0950   OTHER   Discipline speech language pathologist   Rehab Time/Intention   Session Not Performed patient unavailable for treatment  (Out of room)   Recommendations   SLP - Next Appointment 12/27/24

## 2024-12-26 NOTE — PLAN OF CARE
Goal Outcome Evaluation:  Plan of Care Reviewed With: patient        Progress: no change  Outcome Evaluation: Patient continues to be alert to self only. No complaints of pain throughout the shift. Patient had dialysis today. Blood glucose monitored. Skin and wound care performed as ordered. No new issues at this time.

## 2024-12-26 NOTE — NURSING NOTE
Duration of Treatment 3.5 Hours   Access Site AVF   Dialyzer Revaclear    mL/min   Dialysate Temperature (C) 36   Use Crit-Line? No   BFR-As tolerated to a maximum of: 400 mL/min   Prime Dialyzer With NS to Priming Volume? Yes   Dialysate Solution Bath: K+ = 3 mEq, Ca = 2.5mEq   Bicarb 30 meq   Na+ 137 meq   Fluid Removal: 1.5     Patient completed 3.5 hours treatment, tolerated well.  1.5L removed. VSS throughout.  Post tx report given to primary nurse, HIREN Vasquez.

## 2024-12-26 NOTE — PROGRESS NOTES
[facility]   Hospitalist Progress Note  Date: 2024  Patient Name: Nelson Wang  : 1945  MRN: [id number]  Date of admission: 2024  Room/Bed: [facility]      Subjective   Subjective     Chief Complaint: Shortness of breath    Summary:Nelson Wang is a 79 y.o. male ESRD on dialysis, type 2 diabetes, dementia, discitis on vancomycin, A-fib, restrictive lung disease who presents to the emergency department for evaluation of hypoxia and shortness of breath. Patient was started on supplemental oxygen to maintain his oxygen saturations. He was given a dose of Bumex overnight. His chest x-ray was consistent with bilateral pleural effusions. Pulmonology was consulted for the bilateral pleural effusions and and nephrology consulted for possible volume overload. Pulmonology was performing a thoracentesis this morning. Thoracentesis was showing bloody fluid. Patient suffered CODE BLUE. Likely bleeding into his airways causing hypoxic arrest. He received CPR for 6 minutes and achieved ROSC. He was transferred to the ICU, intubated and on blood pressure support. Bronchoscopy was showing blood clots in his airways. Patient was started on CRRT. Patient's medical status is tenuous. He is currently on 1 pressor. CRRT is removing fluid at a slow rate. Patient started on micafungin on 2024. Patient extubated on 2024. Patient on high flow nasal cannula 30 L 25%. Slowly weaning down on supplemental oxygen, breathing more comfortably. Poor mental status still waxing and waning suspect underlying dementia with history of alcohol abuse, mixed with hospital delirium and hyperactive delirium.  Patient continues on hemodialysis with midodrine on dialysis days has required extra Seroquel and occasional as needed medications for sedation to allow him to tolerate hemodialysis.  Working on mentation and trying to get back home ultimately.     Patient has completed IV Vanco and micafungin course while in the  hospital.    Interval Followup: No acute overnight events.  Patient seen on dialysis.  Patient is trying to get out of the bed at dialysis he is getting very anxious.    He has a sitter  Planning to discharge patient on Sat. Allowing for home dialysis to be arranged     Review of Systems    All systems reviewed and negative except for what is outlined above.      Objective   Objective     Vitals:   Temp:  [97.7 °F (36.5 °C)-98 °F (36.7 °C)] 97.8 °F (36.6 °C)  Heart Rate:  [86-98] 96  Resp:  [15-18] 18  BP: (103-157)/(47-89) 142/72    Physical Exam   Gen: NAD, somewhat confused. Patient seen on dialysis   Cards: RRR  Pulm: No respiratory distress  Abd: soft, nondistended  Extremities: no pitting edema    Result Review    Result Review:  I have personally reviewed these results:  [x]  Laboratory      Lab 12/23/24  0453 12/22/24  1547 12/20/24  0650   WBC 6.27 7.04 5.56   HEMOGLOBIN 8.2* 8.5* 8.4*   HEMATOCRIT 27.1* 27.5* 27.5*   PLATELETS 245 222 184   NEUTROS ABS  --  3.80 3.14   IMMATURE GRANS (ABS)  --  0.13* 0.02   LYMPHS ABS  --  1.60 1.24   MONOS ABS  --  0.90 0.74   EOS ABS  --  0.54* 0.39   MCV 89.4 86.5 88.1   PROCALCITONIN  --  0.62*  --          Lab 12/25/24  0810 12/23/24  0453 12/22/24  1547 12/20/24  0650 12/20/24  0650 12/20/24  0444   SODIUM 135* 133* 134*   < > 130*  --    POTASSIUM 4.5 4.4 4.9   < > 4.0  --    CHLORIDE 96* 93* 96*   < > 90*  --    CO2 26.4 23.9 23.0   < > 26.4  --    ANION GAP 12.6 16.1* 15.0   < > 13.6  --    BUN 41* 68* 62*   < > 68*  --    CREATININE 5.33* 5.67* 4.89*   < > 4.52*  --    EGFR 10.3* 9.6* 11.5*   < > 12.6*  --    GLUCOSE 98 98 118*   < > 146*  --    CALCIUM 10.3 9.8 9.8   < > 9.7  --    MAGNESIUM  --   --  2.1  --  2.1  --    PHOSPHORUS  --  5.3* 5.1*  --   --  4.5    < > = values in this interval not displayed.         Lab 12/23/24  0453 12/22/24  1547   ALBUMIN 2.7* 3.2*                       Brief Urine Lab Results  (Last result in the past 365 days)        Color    Clarity   Blood   Leuk Est   Nitrite   Protein   CREAT   Urine HCG        10/27/24 1417 Yellow   Cloudy   Negative   Trace   Negative   >=300 mg/dL (3+)                 [x]  Microbiology   Microbiology Results (last 10 days)       Procedure Component Value - Date/Time    Blood Culture - Blood, Hand, Right [[id number]]  (Normal) Collected: 12/23/24 0453    Lab Status: Preliminary result Specimen: Blood from Hand, Right Updated: 12/26/24 0530     Blood Culture No growth at 3 days    Narrative:      Less than seven (7) mL's of blood was collected.  Insufficient quantity may yield false negative results.    Blood Culture - Blood, Arm, Right [[id number]]  (Normal) Collected: 12/23/24 0453    Lab Status: Preliminary result Specimen: Blood from Arm, Right Updated: 12/26/24 0530     Blood Culture No growth at 3 days    Narrative:      Less than seven (7) mL's of blood was collected.  Insufficient quantity may yield false negative results.          [x]  Radiology  XR Abdomen KUB    Result Date: 12/21/2024  Impression: 1.Enteric tube terminates in the left upper quadrant, with tip likely in the mid stomach. 2.Calcification at the right renal pelvis, as before. Electronically Signed: Enrique Villavicencio  12/21/2024 12:22 PM EST  Workstation ID: [id number]    XR Chest 1 View    Result Date: 12/20/2024  Feeding tube extends well below the diaphragm. The tip is not seen. Electronically Signed: Cristian Tiwari MD  12/20/2024 10:38 PM EST  Workstation ID: [id number]    XR Chest 1 View    Result Date: 12/19/2024  No significant interval change is appreciated radiographically since the prior study from 12/18/2024 at 5:21 a.m.   Portions of this note were completed with a voice recognition program.  Electronically Signed By-Stan Hogan MD On:12/19/2024 5:49 AM     []  EKG/Telemetry   []  Cardiology/Vascular   []  Pathology  []  Old records  []  Other:    Assessment & Plan   Assessment / Plan     Assessment/Plan (clinically significant if  listed here)  Cardiac arrest secondary to hypoxemia in setting of thoracentesis  Acute hypoxic/hypercapnic respiratory failure requiring mechanical ventilation  Acute metabolic encephalopathy/altered mental status, initial concern for seizures, EEG negative x 2  Acute on chronic diastolic heart failure  Acute cardiogenic pulmonary edema  Bilateral pleural effusions  Hemoptysis  Candidemia s/p iv micafungin course completed 12/22/24  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  Will use Seroquel as needed if needed on dialysis days and continue to redirect  Continue midodrine 10mg on HD days, cont daily weights, on RONALD outpt, seroquel prn dose on HD days.  Blood pressure is doing okay on nondialysis days   Off oxygen currently, better after had further volume remove.  Continue to monitor closely for further aspiration, continue with aspiration cautions and adjusted diet per speech.    discussed with ID previously, has completed IV vancomycin course for staph epi discitis, apparently Dr. Sigala (ID in [place]) saw him and 6 week course would be completed 12/14.  iv micafungin 14d course completed 12/22  EEG from prior hospitalization 11/15 and 12/10 without seizure activity noted on limited study.  Keppra has been dc'd given concern of contribution to mentation.  Will continue to monitor without this.    12/1 ct head had a small hematoma, no acute abnormality, has generalized parenchymal volume loss noted, suspicious for underlying component of dementia,  along with uremia/hospital delirium contributing to current issues with mentation   As needed  pain medication, lidocaine patch, Voltaren try to avoid sedating meds  Continue Synthroid  Continue SSI and monitor blood glucose  Continue famotidine  Continue PT/OT  Continue supportive care discussions with patient and family, guarded long-term prognosis  Continue hospitalization at current level of care     Dispo: Home on Sat      Discussed with RN.    VTE Prophylaxis:  Pharmacologic & mechanical VTE prophylaxis orders are present.        CODE STATUS:   Level Of Support Discussed With: Next of Kin (If No Surrogate)  Code Status (Patient has no pulse and is not breathing): No CPR (Do Not Attempt to Resuscitate)  Medical Interventions (Patient has pulse or is breathing): Full Support      Electronically signed by Harshal Higuera DO, 12/26/2024, 11:15 EST.

## 2024-12-26 NOTE — PROGRESS NOTES
[facility]     Nephrology Progress Note      Patient Name: Nelson Wang  : 1945  MRN: [id number]  Primary Care Physician:  Domingo Bravo MD  Date of admission: 2024    Subjective   Subjective     Interval History:  No new events.  Seen in dialysis. restless.  Talking little bit more. Sitter present.  Hard of hearing.     Objective   Objective     Vitals:   Temp:  [97.7 °F (36.5 °C)-98 °F (36.7 °C)] 97.8 °F (36.6 °C)  Heart Rate:  [86-98] 96  Resp:  [15-18] 18  BP: (103-157)/(47-89) 142/72  Physical Exam:   constitutional: mildly restless, in bed, in dialysis hard of hearing.     Eyes: sclerae anicteric, no conjunctival injection   HENT: mucous membranes moist.     Neck: Supple, no thyromegaly, no lymphadenopathy, trachea midline, No JVD   Respiratory: Decreased to auscultation bilaterally, nonlabored respirations, some upper airway rhonchi.   Cardiovascular: RRR, no murmurs, rubs, or gallops.   Gastrointestinal: Positive bowel sounds, soft,  nondistended   Musculoskeletal: No edema, no clubbing or cyanosis.  Left upper extremity AV fistula, patent.   Neurologic: moving extremities, answering questions inconsistently   Skin: warm and dry, no rashes, areas of hypopigmentation.    Result Review    Result Reviewed:  I have personally reviewed the results from the time of this admission to 2024 11:25 EST and agree with these findings:  [x]  Laboratory  []  Microbiology  [x]  Radiology  []  EKG/Telemetry   []  Cardiology/Vascular   []  Pathology  [x]  Old records  []  Other:        Lab 24  0810 24  0453 24  1547 24  0650 24  0444   SODIUM 135* 133* 134* 130*  --    POTASSIUM 4.5 4.4 4.9 4.0  --    CHLORIDE 96* 93* 96* 90*  --    CO2 26.4 23.9 23.0 26.4  --    BUN 41* 68* 62* 68*  --    CREATININE 5.33* 5.67* 4.89* 4.52*  --    GLUCOSE 98 98 118* 146*  --    EGFR 10.3* 9.6* 11.5* 12.6*  --    ANION GAP 12.6 16.1* 15.0 13.6  --    MAGNESIUM  --   --  2.1 2.1  --     PHOSPHORUS  --  5.3* 5.1*  --  4.5             Assessment & Plan   Assessment / Plan       Active Hospital Problems:  Active Hospital Problems    Diagnosis     **Hypoxia        Assessment and Plan:    - ESRD, was on home dialysis.  Left upper extremity AV fistula for access.  Electrolytes reviewed.  Seen on dialysis.  UF 1.5 L as tolerated today.     - Volume overload, much better now.         - Aspiration pneumonia, and possibly recurrent aspiration, per pulmonary.      - Type 2 diabetes with complications.     - Hypotension blood pressure is acceptable now, on ProAmatine and blood pressure stable.     - Anemia of chronic kidney disease, on RONALD as outpatient.  Getting Epogen while here.     - Altered mentation.  Multifactorial including possible dementia?,  May be slightly better today, Per primary.    Discussed with sitter and dialysis staff.  Please call with any questions    Will follow.

## 2024-12-26 NOTE — PLAN OF CARE
Goal Outcome Evaluation:  Plan of Care Reviewed With: patient        Progress: improving  Outcome Evaluation: Pt alert to self this shift. Restless, SW at bedside during shift. SW pushed pt in wheelchair several times at beginning of shift. VSS. Plan for HD today. Pt had restless sleep majority of shift. Call light in reach, SW at bedside. No new issues/needs at this time.

## 2024-12-26 NOTE — SIGNIFICANT NOTE
12/26/24 1336   OTHER   Discipline occupational therapist   Rehab Time/Intention   Session Not Performed patient unavailable for treatment  (dialysis)

## 2024-12-27 LAB
ANION GAP SERPL CALCULATED.3IONS-SCNC: 13.9 MMOL/L (ref 5–15)
BUN SERPL-MCNC: 22 MG/DL (ref 8–23)
BUN/CREAT SERPL: 5.1 (ref 7–25)
CALCIUM SPEC-SCNC: 10.2 MG/DL (ref 8.6–10.5)
CHLORIDE SERPL-SCNC: 94 MMOL/L (ref 98–107)
CO2 SERPL-SCNC: 25.1 MMOL/L (ref 22–29)
CREAT SERPL-MCNC: 4.35 MG/DL (ref 0.76–1.27)
DEPRECATED RDW RBC AUTO: 66.7 FL (ref 37–54)
EGFRCR SERPLBLD CKD-EPI 2021: 13.2 ML/MIN/1.73
ERYTHROCYTE [DISTWIDTH] IN BLOOD BY AUTOMATED COUNT: 20.4 % (ref 12.3–15.4)
GLUCOSE BLDC GLUCOMTR-MCNC: 142 MG/DL (ref 70–99)
GLUCOSE BLDC GLUCOMTR-MCNC: 91 MG/DL (ref 70–99)
GLUCOSE SERPL-MCNC: 107 MG/DL (ref 65–99)
HCT VFR BLD AUTO: 31.4 % (ref 37.5–51)
HGB BLD-MCNC: 9.3 G/DL (ref 13–17.7)
MAGNESIUM SERPL-MCNC: 2 MG/DL (ref 1.6–2.4)
MCH RBC QN AUTO: 26.7 PG (ref 26.6–33)
MCHC RBC AUTO-ENTMCNC: 29.6 G/DL (ref 31.5–35.7)
MCV RBC AUTO: 90.2 FL (ref 79–97)
PLATELET # BLD AUTO: 244 10*3/MM3 (ref 140–450)
PMV BLD AUTO: 10.1 FL (ref 6–12)
POTASSIUM SERPL-SCNC: 4.2 MMOL/L (ref 3.5–5.2)
RBC # BLD AUTO: 3.48 10*6/MM3 (ref 4.14–5.8)
SODIUM SERPL-SCNC: 133 MMOL/L (ref 136–145)
WBC NRBC COR # BLD AUTO: 5.59 10*3/MM3 (ref 3.4–10.8)

## 2024-12-27 PROCEDURE — 83735 ASSAY OF MAGNESIUM: CPT | Performed by: INTERNAL MEDICINE

## 2024-12-27 PROCEDURE — 80048 BASIC METABOLIC PNL TOTAL CA: CPT | Performed by: INTERNAL MEDICINE

## 2024-12-27 PROCEDURE — 94664 DEMO&/EVAL PT USE INHALER: CPT

## 2024-12-27 PROCEDURE — 94799 UNLISTED PULMONARY SVC/PX: CPT

## 2024-12-27 PROCEDURE — 97164 PT RE-EVAL EST PLAN CARE: CPT

## 2024-12-27 PROCEDURE — 99232 SBSQ HOSP IP/OBS MODERATE 35: CPT | Performed by: INTERNAL MEDICINE

## 2024-12-27 PROCEDURE — 85027 COMPLETE CBC AUTOMATED: CPT | Performed by: INTERNAL MEDICINE

## 2024-12-27 PROCEDURE — 82948 REAGENT STRIP/BLOOD GLUCOSE: CPT

## 2024-12-27 RX ADMIN — BUDESONIDE 0.5 MG: 0.5 INHALANT ORAL at 08:04

## 2024-12-27 RX ADMIN — ARFORMOTEROL TARTRATE 15 MCG: 15 SOLUTION RESPIRATORY (INHALATION) at 19:46

## 2024-12-27 RX ADMIN — Medication 2.5 MG: at 22:15

## 2024-12-27 RX ADMIN — BUDESONIDE 0.5 MG: 0.5 INHALANT ORAL at 19:47

## 2024-12-27 RX ADMIN — MIDODRINE HYDROCHLORIDE 10 MG: 10 TABLET ORAL at 08:27

## 2024-12-27 RX ADMIN — LEVOTHYROXINE SODIUM 175 MCG: 0.03 TABLET ORAL at 08:27

## 2024-12-27 RX ADMIN — ARFORMOTEROL TARTRATE 15 MCG: 15 SOLUTION RESPIRATORY (INHALATION) at 08:04

## 2024-12-27 RX ADMIN — WHITE PETROLATUM 1 APPLICATION: 1.75 OINTMENT TOPICAL at 08:37

## 2024-12-27 RX ADMIN — QUETIAPINE FUMARATE 25 MG: 25 TABLET ORAL at 22:15

## 2024-12-27 RX ADMIN — FAMOTIDINE 10 MG: 20 TABLET, FILM COATED ORAL at 08:27

## 2024-12-27 NOTE — PROGRESS NOTES
[facility]   Hospitalist Progress Note  Date: 2024  Patient Name: Nelson Wang  : 1945  MRN: [id number]  Date of admission: 2024  Room/Bed: [facility]      Subjective   Subjective     Chief Complaint: Shortness of breath    Summary:Nelson Wang is a 79 y.o. male ESRD on dialysis, type 2 diabetes, dementia, discitis on vancomycin, A-fib, restrictive lung disease who presents to the emergency department for evaluation of hypoxia and shortness of breath. Patient was started on supplemental oxygen to maintain his oxygen saturations. He was given a dose of Bumex overnight. His chest x-ray was consistent with bilateral pleural effusions. Pulmonology was consulted for the bilateral pleural effusions and and nephrology consulted for possible volume overload. Pulmonology was performing a thoracentesis this morning. Thoracentesis was showing bloody fluid. Patient suffered CODE BLUE. Likely bleeding into his airways causing hypoxic arrest. He received CPR for 6 minutes and achieved ROSC. He was transferred to the ICU, intubated and on blood pressure support. Bronchoscopy was showing blood clots in his airways. Patient was started on CRRT. Patient's medical status is tenuous. He is currently on 1 pressor. CRRT is removing fluid at a slow rate. Patient started on micafungin on 2024. Patient extubated on 2024. Patient on high flow nasal cannula 30 L 25%. Slowly weaning down on supplemental oxygen, breathing more comfortably. Poor mental status still waxing and waning suspect underlying dementia with history of alcohol abuse, mixed with hospital delirium and hyperactive delirium.  Patient continues on hemodialysis with midodrine on dialysis days has required extra Seroquel and occasional as needed medications for sedation to allow him to tolerate hemodialysis.  Working on mentation and trying to get back home ultimately.     Patient has completed IV Vanco and micafungin course while in the  hospital.    Interval Followup: No acute overnight events.  Patient is currently sleeping.  Sitter states he has been sleeping most of the morning  He has a sitter  Planning to discharge patient on Sat. Allowing for home dialysis to be arranged     Review of Systems    All systems reviewed and negative except for what is outlined above.      Objective   Objective     Vitals:   Temp:  [97.7 °F (36.5 °C)-98.1 °F (36.7 °C)] 98.1 °F (36.7 °C)  Heart Rate:  [] 94  Resp:  [16-20] 20  BP: (113-142)/(51-89) 129/69    Physical Exam   Gen: NAD, somewhat confused.  Sitter at the bedside  Cards: RRR  Pulm: No respiratory distress  Abd: soft, nondistended  Extremities: no pitting edema    Result Review    Result Review:  I have personally reviewed these results:  [x]  Laboratory      Lab 12/27/24  0535 12/23/24  0453 12/22/24  1547   WBC 5.59 6.27 7.04   HEMOGLOBIN 9.3* 8.2* 8.5*   HEMATOCRIT 31.4* 27.1* 27.5*   PLATELETS 244 245 222   NEUTROS ABS  --   --  3.80   IMMATURE GRANS (ABS)  --   --  0.13*   LYMPHS ABS  --   --  1.60   MONOS ABS  --   --  0.90   EOS ABS  --   --  0.54*   MCV 90.2 89.4 86.5   PROCALCITONIN  --   --  0.62*         Lab 12/27/24  0535 12/25/24  0810 12/23/24  0453 12/22/24  1547   SODIUM 133* 135* 133* 134*   POTASSIUM 4.2 4.5 4.4 4.9   CHLORIDE 94* 96* 93* 96*   CO2 25.1 26.4 23.9 23.0   ANION GAP 13.9 12.6 16.1* 15.0   BUN 22 41* 68* 62*   CREATININE 4.35* 5.33* 5.67* 4.89*   EGFR 13.2* 10.3* 9.6* 11.5*   GLUCOSE 107* 98 98 118*   CALCIUM 10.2 10.3 9.8 9.8   MAGNESIUM 2.0  --   --  2.1   PHOSPHORUS  --   --  5.3* 5.1*         Lab 12/23/24  0453 12/22/24  1547   ALBUMIN 2.7* 3.2*                       Brief Urine Lab Results  (Last result in the past 365 days)        Color   Clarity   Blood   Leuk Est   Nitrite   Protein   CREAT   Urine HCG        10/27/24 1417 Yellow   Cloudy   Negative   Trace   Negative   >=300 mg/dL (3+)                 [x]  Microbiology   Microbiology Results (last 10 days)        Procedure Component Value - Date/Time    Blood Culture - Blood, Hand, Right [[id number]]  (Normal) Collected: 12/23/24 0453    Lab Status: Preliminary result Specimen: Blood from Hand, Right Updated: 12/27/24 0530     Blood Culture No growth at 4 days    Narrative:      Less than seven (7) mL's of blood was collected.  Insufficient quantity may yield false negative results.    Blood Culture - Blood, Arm, Right [[id number]]  (Normal) Collected: 12/23/24 0453    Lab Status: Preliminary result Specimen: Blood from Arm, Right Updated: 12/27/24 0530     Blood Culture No growth at 4 days    Narrative:      Less than seven (7) mL's of blood was collected.  Insufficient quantity may yield false negative results.          [x]  Radiology  XR Abdomen KUB    Result Date: 12/21/2024  Impression: 1.Enteric tube terminates in the left upper quadrant, with tip likely in the mid stomach. 2.Calcification at the right renal pelvis, as before. Electronically Signed: Enrique Villavicencio  12/21/2024 12:22 PM EST  Workstation ID: [id number]    XR Chest 1 View    Result Date: 12/20/2024  Feeding tube extends well below the diaphragm. The tip is not seen. Electronically Signed: Cristian Tiwari MD  12/20/2024 10:38 PM EST  Workstation ID: [id number]   []  EKG/Telemetry   []  Cardiology/Vascular   []  Pathology  []  Old records  []  Other:    Assessment & Plan   Assessment / Plan     Assessment/Plan (clinically significant if listed here)  Cardiac arrest secondary to hypoxemia in setting of thoracentesis  Acute hypoxic/hypercapnic respiratory failure requiring mechanical ventilation  Acute metabolic encephalopathy/altered mental status, initial concern for seizures, EEG negative x 2  Acute on chronic diastolic heart failure  Acute cardiogenic pulmonary edema  Bilateral pleural effusions  Hemoptysis  Candidemia s/p iv micafungin course completed 12/22/24  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  Will use Seroquel as needed if needed on dialysis days and continue to redirect  Continue midodrine 10mg on HD days, cont daily weights, on RONALD outpt, seroquel prn dose on HD days.  Blood pressure is doing okay on nondialysis days   Off oxygen currently, better after had further volume remove.  Continue to monitor closely for further aspiration, continue with aspiration cautions and adjusted diet per speech.    discussed with ID previously, has completed IV vancomycin course for staph epi discitis, apparently Dr. Sigala (ID in [place]) saw him and 6 week course would be completed 12/14.  iv micafungin 14d course completed 12/22  EEG from prior hospitalization 11/15 and 12/10 without seizure activity noted on limited study.  Keppra has been dc'd given concern of contribution to mentation.  Will continue to monitor without this.    12/1 ct head had a small hematoma, no acute abnormality, has generalized parenchymal volume loss noted, suspicious for underlying component of dementia,  along with uremia/hospital delirium contributing to current issues with mentation   Continue Synthroid  Continue SSI and monitor blood glucose  Continue famotidine  Continue PT/OT  Continue supportive care discussions with patient and family, guarded long-term prognosis  Continue hospitalization at current level of care     Dispo: Home on Sat      Discussed with RN.    VTE Prophylaxis:  Pharmacologic & mechanical VTE prophylaxis orders are present.        CODE STATUS:   Level Of Support Discussed With: Next of Kin (If No Surrogate)  Code Status (Patient has no pulse and is not breathing): No CPR (Do Not Attempt to  Resuscitate)  Medical Interventions (Patient has pulse or is breathing): Full Support      Electronically signed by Harshal Higuera DO, 12/27/2024, 09:03 EST.

## 2024-12-27 NOTE — PLAN OF CARE
Goal Outcome Evaluation:  Plan of Care Reviewed With: patient        Progress:  (pt has shown improvements since initial evaluation with all functional mobility)  Outcome Evaluation: To optimize functional mobility and independence, skilled PT services needed to address mobility needs, strength, endurance, and balance impairments.  Continue plan of care for another 10 days as all goals are still appropriate and addressed below to meet patient's needs.    Anticipated Discharge Disposition (PT): inpatient rehabilitation facility, home with home health, home with 24/7 care

## 2024-12-27 NOTE — PROGRESS NOTES
"Nutrition Services    Patient Name: Nelson Wang  YOB: 1945  MRN: [id number]  Admission date: 12/5/2024      CLINICAL NUTRITION ASSESSMENT      Reason for Assessment  EN Follow Up   H&P:  Past Medical History:   Diagnosis Date    Abnormal stress test     Anemia     IRON INFUSIONS WITH DIALYSIS    Anesthesia     WITH HIP REPLACEMENT DAUGHTER BELIEVES HAD SVT 2000    Ankle pain, right     Anxiety and depression     Arthritis     CKD (chronic kidney disease), stage IV     DIALYSIS [id number] SHILEY IN RIGHT CHEST    Diabetes     GERD (gastroesophageal reflux disease)     Gout     High cholesterol     History of peritoneal dialysis     HL (hearing loss)     Hyperkalemia     Hypertension     Hypothyroidism     Insomnia     Night terrors     Psoriasis     Psoriasis     Sleep apnea     Sleep walking     Thrombocytopenia     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    Vitiligo         Current Problems:   Active Hospital Problems    Diagnosis     **Hypoxia         Nutrition/Diet History         Narrative   Pt's diet has been upgraded to a Renal, Mechanical Ground diet per SLP recommendation (50-75% intake). Tolerating well, with no concerns noted. Novasource BID is in place for nutrition support. Continue nutrition interventions and RD to follow per protocol.      Anthropometrics        Current Height, Weight Height: 170.2 cm (67\")  Weight: 63.1 kg (139 lb 1.8 oz)   Current BMI Body mass index is 21.79 kg/m².   BMI Classification Normal range Healthy range for age 23-30   % IBW 95%   Adjusted Body Weight (ABW) N/A   Weight Hx  Wt Readings from Last 15 Encounters:   12/27/24 0541 63.1 kg (139 lb 1.8 oz)   12/26/24 0738 63.1 kg (139 lb 3.2 oz)   12/25/24 0333 67 kg (147 lb 11.3 oz)   12/24/24 0530 67.9 kg (149 lb 11.1 oz)   12/23/24 0418 68.1 kg (150 lb 2.1 oz)   12/22/24 0525 68.6 kg (151 lb 3.8 oz)   12/21/24 0500 75.4 kg (166 lb 3.6 oz)   12/20/24 0350 67.4 kg (148 lb 9.4 oz)   12/18/24 2009 65.3 kg " (143 lb 15.4 oz)   12/18/24 0300 67 kg (147 lb 11.3 oz)   12/17/24 0440 65.8 kg (145 lb 1 oz)   12/16/24 0547 64.3 kg (141 lb 12.1 oz)   12/13/24 0407 62.5 kg (137 lb 12.6 oz)   12/12/24 0500 69.3 kg (152 lb 12.5 oz)   12/09/24 0600 70.2 kg (154 lb 12.2 oz)   12/06/24 1228 70.7 kg (155 lb 13.8 oz)   12/05/24 1850 75.3 kg (166 lb 0.1 oz)   12/01/24 1701 71 kg (156 lb 8.4 oz)   11/22/24 1311 71.8 kg (158 lb 3.2 oz)   11/18/24 0351 67.9 kg (149 lb 11.1 oz)   11/17/24 0255 71.9 kg (158 lb 8.2 oz)   11/16/24 0316 67.9 kg (149 lb 11.1 oz)   11/14/24 0611 73.9 kg (162 lb 14.7 oz)   11/13/24 0500 73 kg (161 lb)   11/12/24 0523 73.2 kg (161 lb 6 oz)   11/11/24 1200 72.8 kg (160 lb 7.9 oz)   11/10/24 0500 74.4 kg (164 lb 0.4 oz)   11/08/24 0640 73 kg (160 lb 14.4 oz)   11/07/24 0621 72.7 kg (160 lb 3.2 oz)   11/05/24 0600 68.9 kg (151 lb 14.4 oz)   11/04/24 1402 67 kg (147 lb 11.3 oz)   11/04/24 0410 67.9 kg (149 lb 11.1 oz)   11/01/24 0159 74.3 kg (163 lb 12.8 oz)   10/27/24 0700 72.5 kg (159 lb 13.3 oz)   10/27/24 0346 75.7 kg (166 lb 14.2 oz)   10/09/24 1402 75.7 kg (166 lb 12.8 oz)   10/08/24 1455 75.3 kg (166 lb 0.1 oz)   10/02/24 0812 77.5 kg (170 lb 13.7 oz)   09/28/24 0330 77.2 kg (170 lb 3.1 oz)   09/27/24 1659 76.2 kg (167 lb 15.9 oz)   09/26/24 0823 78.9 kg (174 lb)   09/16/24 1814 79.3 kg (174 lb 13.2 oz)   09/12/24 1344 77.1 kg (169 lb 15.6 oz)   08/30/24 0927 75.6 kg (166 lb 10.7 oz)   08/14/24 0922 76.7 kg (169 lb)   07/17/24 1116 77.2 kg (170 lb 3.2 oz)          Wt Change Observation I/O's reveal -5.3 L since admission (~12 #s)     Estimated/Assessed Needs  Estimated Needs based on: Current Body Weight 62.5 kg       Energy Requirements 25-30 kcal/kg   EST Needs (kcal/day) 8527-9070        Protein Requirements 1.2-1.5 g.kg   EST Daily Needs (g/day) 75-94       Fluid Requirements 1mL/kcal    Estimated Needs (mL/day) 9435-4279     Labs/Medications Phos low, receiving K phos repletion.         Pertinent Labs  Reviewed.   Results from last 7 days   Lab Units 12/27/24  0535 12/25/24  0810 12/23/24  0453   SODIUM mmol/L 133* 135* 133*   POTASSIUM mmol/L 4.2 4.5 4.4   CHLORIDE mmol/L 94* 96* 93*   CO2 mmol/L 25.1 26.4 23.9   BUN mg/dL 22 41* 68*   CREATININE mg/dL 4.35* 5.33* 5.67*   CALCIUM mg/dL 10.2 10.3 9.8   GLUCOSE mg/dL 107* 98 98     Results from last 7 days   Lab Units 12/27/24  0535 12/23/24  0453 12/22/24  1547   MAGNESIUM mg/dL 2.0  --  2.1   PHOSPHORUS mg/dL  --  5.3* 5.1*   HEMOGLOBIN g/dL 9.3* 8.2* 8.5*   HEMATOCRIT % 31.4* 27.1* 27.5*     COVID19   Date Value Ref Range Status   10/08/2024 Not Detected Not Detected - Ref. Range Final     Lab Results   Component Value Date    HGBA1C 5.90 (H) 11/09/2024         Pertinent Medications Reviewed.     Malnutrition Severity Assessment              Nutrition Diagnosis         Nutrition Dx Problem 1 Inadequate oral Intake related to Inability to consume sufficient energy as evidenced by decreased appetite.     Nutrition Intervention           Current Nutrition Orders & Evaluation of Intake       Current PO Diet Diet: Renal; Low Potassium; No Straw; Texture: Mechanical Ground (NDD 2); Fluid Consistency: Thin (IDDSI 0)   Supplement Orders Placed This Encounter      Dietary Nutrition Supplements Novasource Renal (Nepro)           Nutrition Intervention/Prescription        Continue Renal, Mechanical Ground diet as tolerated  Continue Novasource BID        Medical Nutrition Therapy/Nutrition Education          Learner     Readiness N/A  N/A     Method     Response N/A  N/A     Monitor/Evaluation        Monitor PO intake, Pertinent labs, EN delivery/tolerance, GI status, Hemodynamic stability     Nutrition Discharge Plan         To be determined     Electronically signed by:  Stephie Garsia RD  12/27/24 09:31 EST

## 2024-12-27 NOTE — THERAPY RE-EVALUATION
Acute Care - Physical Therapy Re-Evaluation  BRIA Sullivan     Patient Name: Nelson Wang  : 1945  MRN: [id number]  Today's Date: 2024      Visit Dx:     ICD-10-CM ICD-9-CM   1. Acute respiratory failure with hypoxia  J96.01 518.81   2. Acute pulmonary edema  J81.0 518.4   3. Difficulty walking  R26.2 719.7   4. Oropharyngeal dysphagia  R13.12 787.22     Patient Active Problem List   Diagnosis    Essential hypertension    Bradycardia, sinus    Anemia due to chronic kidney disease    Hypothyroidism    Idiopathic gout    Proteinuria    Psoriasis    Thrombocytopenia    Type 2 diabetes mellitus without complication    CKD (chronic kidney disease), stage IV    Hyperlipidemia    Monoclonal gammopathy of unknown significance (MGUS)    Stage 5 chronic kidney disease    Chronic gout without tophus    Peritoneal dialysis catheter dysfunction    Medicare annual wellness visit, subsequent    Chronic cough    Peritonitis associated with peritoneal dialysis    Recurrent pneumonia    Acute on chronic respiratory failure with hypoxia    End stage renal disease    Aspiration pneumonitis    Sepsis    Type 2 diabetes mellitus, with long-term current use of insulin    GERD without esophagitis    Immunosuppression due to drug therapy    Bipolar disorder    Chronic respiratory failure with hypoxia    Shortness of breath    Abnormal stress test    ESRD on dialysis    Abnormal chest CT    Encounter for screening for malignant neoplasm of colon    History of colon polyps    Family history of colon cancer    Intractable pain    Abdominal pain    ESRD (end stage renal disease) on dialysis    AMS (altered mental status)    Hallucinations    LUQ pain    Discitis    Metabolic encephalopathy    Aspiration pneumonia    Discitis of lumbar region    Severe malnutrition    Dementia    Unspecified severe protein-calorie malnutrition    Hypoxia     Past Medical History:   Diagnosis Date    Abnormal stress test     Anemia     IRON INFUSIONS  WITH DIALYSIS    Anesthesia     WITH HIP REPLACEMENT DAUGHTER BELIEVES HAD SVT 2000    Ankle pain, right     Anxiety and depression     Arthritis     CKD (chronic kidney disease), stage IV     DIALYSIS [id number] SHILEY IN RIGHT CHEST    Diabetes     GERD (gastroesophageal reflux disease)     Gout     High cholesterol     History of peritoneal dialysis     HL (hearing loss)     Hyperkalemia     Hypertension     Hypothyroidism     Insomnia     Night terrors     Psoriasis     Psoriasis     Sleep apnea     Sleep walking     Thrombocytopenia     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    Vitiligo      Past Surgical History:   Procedure Laterality Date    ABDOMINAL SURGERY      ANKLE SURGERY Right 11/1990    APPENDECTOMY N/A 1954    ARTERIOVENOUS FISTULA/SHUNT SURGERY Left 07/16/2024    Procedure: Creation of left arm arteriovenous fistula;  Surgeon: Moses Mir MD;  Location: [facility] MAIN OR;  Service: Vascular;  Laterality: Left;    BRONCHOSCOPY N/A 03/01/2024    Procedure: BRONCHOSCOPY WITH BAL AND WASHINGS;  Surgeon: Sandra Espinal MD;  Location: [facility] ENDOSCOPY;  Service: Pulmonary;  Laterality: N/A;  PNEUMONIA    BRONCHOSCOPY N/A 08/30/2024    Procedure: BRONCHOSCOPY WITH BALS AND WASHINGS;  Surgeon: Sandra Espinal MD;  Location: [facility] ENDOSCOPY;  Service: Pulmonary;  Laterality: N/A;  MUCOUS PLUGGING    CARDIAC CATHETERIZATION N/A 05/29/2024    Procedure: Left Heart Cath with possible coronary angioplasty;  Surgeon: Stephan Nichols MD;  Location: [facility] CATH INVASIVE LOCATION;  Service: Cardiology;  Laterality: N/A;    COLONOSCOPY N/A 06/2022    [facility]    ENDOSCOPY N/A 10/28/2024    Procedure: ESOPHAGOGASTRODUODENOSCOPY INSERTION OF LIGHTED INSTRUMENT TO VIEW ESOPHAGUS, STOMACH AND SMALL INTESTINE;  Surgeon: Kingsley Peraza MD;  Location: [facility] ENDOSCOPY;  Service: Gastroenterology;  Laterality: N/A;  Gastritis    FRACTURE SURGERY      HIP BIPOLAR REPLACEMENT Right 01/2000     INSERTION HEMODIALYSIS CATHETER Left 04/09/2024    Procedure: HEMODIALYSIS CATHETER INSERTION;  Surgeon: Jose Berry MD;  Location:  [facility] MAIN OR;  Service: General;  Laterality: Left;    INSERTION HEMODIALYSIS CATHETER N/A 04/12/2024    Procedure: Tunneled hemodialysis catheter insertion;  Surgeon: Enrique Vinson MD;  [facility];  Service: Vascular;  Laterality: N/A;    INSERTION PERITONEAL DIALYSIS CATHETER N/A 03/27/2023    Procedure: LAPAROSCOPIC INSERTION PERITONEAL DIALYSIS CATHETER, LAPAROSCOPIC OMENTOPEXY WITH LYSIS OF ADHESIONS;  Surgeon: Jose Berry MD;  [facility];  Service: General;  Laterality: N/A;    INSERTION PERITONEAL DIALYSIS CATHETER Left 07/23/2023    Procedure: REVISION OF PERITONEAL DIALYSIS CATHETER;  Surgeon: Radha Oreilly MD;  [facility];  Service: General;  Laterality: Left;    JOINT REPLACEMENT      REMOVAL PERITONEAL DIALYSIS CATHETER N/A 04/09/2024    Procedure: REMOVAL PERITONEAL DIALYSIS CATHETER;  Surgeon: Jose Berry MD;  [facility];  Service: General;  Laterality: N/A;    RENAL BIOPSY Left 07/15/2022    UPPER GASTROINTESTINAL ENDOSCOPY      VASCULAR SURGERY      WRIST SURGERY      UNSURE WHICH SIDE DAUGHTER REPORTS HAD SEVERE  CUT FROM WINDOW AND THEY HAD TO DO RECONSTRUCTIVE SURGERY WITH VESSELS AND NERVES     PT Assessment (Last 12 Hours)       PT Evaluation and Treatment       Row Name 12/27/24 1100          Physical Therapy Time and Intention    Subjective Information fatigue  -CS     Document Type re-evaluation  -CS     Mode of Treatment individual therapy;physical therapy  -CS     Patient Effort fair  -CS     Symptoms Noted During/After Treatment fatigue  -CS       Row Name 12/27/24 1100          Pain    Additional Documentation Pain Scale: FACES Pre/Post-Treatment (Group)  -CS       Row Name 12/27/24 1100          Pain Scale: FACES Pre/Post-Treatment    Pain: FACES  Scale, Pretreatment 0-->no hurt  -CS     Posttreatment Pain Rating 0-->no hurt  -CS       Row Name 12/27/24 1100          Cognition    Affect/Mental Status (Cognition) low arousal/lethargic  -CS     Orientation Status (Cognition) oriented to;person  -CS       Row Name 12/27/24 1100          Strength Comprehensive (MMT)    General Manual Muscle Testing (MMT) Assessment lower extremity strength deficits identified  -CS     Comment, General Manual Muscle Testing (MMT) Assessment BLEs grossly observed at 3/5 with ability to move them in bed and lift them off the bed against gravity.  -CS       Row Name 12/27/24 1100          Bed Mobility    Bed Mobility rolling right;rolling left  -CS     Rolling Left [place] (Bed Mobility) supervision  -CS     Rolling Right [place] (Bed Mobility) supervision  -CS     Assistive Device (Bed Mobility) bed rails  -CS     Comment, (Bed Mobility) Pt kept falling back to sleep and closing eyes. Mulitple attempts made to complete out of bed mobility and ambulation. Pt kept saying no. Safety sitter at bedside attempted as well.  -CS       Row Name 12/27/24 1100          Safety Issues/Impairments Affecting Functional Mobility    Safety Issues Affecting Function (Mobility) impulsivity;judgment;problem-solving;sequencing abilities;ability to follow commands  -CS     Impairments Affecting Function (Mobility) balance;cognition;endurance/activity tolerance;strength  -CS     Cognitive Impairments, Mobility Safety/Performance attention;judgment;impulsivity;problem-solving/reasoning;sequencing abilities  -CS       Row Name             Wound 11/15/24 1945 Right upper chest    Wound - Properties Group Placement Date: 11/15/24  -JR Placement Time: 1945  -JR Side: Right  -JR Orientation: upper  -JR Location: chest  -JR Primary Wound Type: Incision  -JR    Retired Wound - Properties Group Placement Date: 11/15/24  -JR Placement Time: 1945  -JR Side: Right  -JR Orientation: upper  -JR Location:  chest  -JR Primary Wound Type: Incision  -JR    Retired Wound - Properties Group Placement Date: 11/15/24  -JR Placement Time: 1945  -JR Side: Right  -JR Orientation: upper  -JR Location: chest  -JR Primary Wound Type: Incision  -JR    Retired Wound - Properties Group Date first assessed: 11/15/24  -JR Time first assessed: 1945  -JR Side: Right  -JR Location: chest  -JR Primary Wound Type: Incision  -JR      Row Name             Wound 12/05/24 2331 Right lower leg abrasion    Wound - Properties Group Placement Date: 12/05/24  -AW Placement Time: 2331 -AW Side: Right  -AW Orientation: lower  -AW Location: leg  -AW Primary Wound Type: Abrasion  -AW Type: abrasion  -AW Present on Original Admission: Y  -AW    Retired Wound - Properties Group Placement Date: 12/05/24  -AW Placement Time: 2331 -AW Present on Original Admission: Y  -AW Side: Right  -AW Orientation: lower  -AW Location: leg  -AW Primary Wound Type: Abrasion  -AW Type: abrasion  -AW    Retired Wound - Properties Group Placement Date: 12/05/24  -AW Placement Time: 2331 -AW Present on Original Admission: Y  -AW Side: Right  -AW Orientation: lower  -AW Location: leg  -AW Primary Wound Type: Abrasion  -AW Type: abrasion  -AW    Retired Wound - Properties Group Date first assessed: 12/05/24  -AW Time first assessed: 2331 -AW Present on Original Admission: Y  -AW Side: Right  -AW Location: leg  -AW Primary Wound Type: Abrasion  -AW Type: abrasion  -AW      Row Name             Wound 12/06/24 1440 Left lower leg skin tear    Wound - Properties Group Placement Date: 12/06/24  -KE Placement Time: 1440  -KE Side: Left  -KE Orientation: lower  -KE Location: leg  -KE Primary Wound Type: Skin tear  -KE Type: skin tear  -KE    Retired Wound - Properties Group Placement Date: 12/06/24  -KE Placement Time: 1440  -KE Side: Left  -KE Orientation: lower  -KE Location: leg  -KE Primary Wound Type: Skin tear  -KE Type: skin tear  -KE    Retired Wound - Properties Group  Placement Date: 12/06/24  -KE Placement Time: 1440  -KE Side: Left  -KE Orientation: lower  -KE Location: leg  -KE Primary Wound Type: Skin tear  -KE Type: skin tear  -KE    Retired Wound - Properties Group Date first assessed: 12/06/24  -KE Time first assessed: 1440  -KE Side: Left  -KE Location: leg  -KE Primary Wound Type: Skin tear  -KE Type: skin tear  -KE      Row Name 12/27/24 1100          Plan of Care Review    Plan of Care Reviewed With patient  -CS     Progress --  pt has shown improvements since initial evaluation with all functional mobility  -CS     Outcome Evaluation To optimize functional mobility and independence, skilled PT services needed to address mobility needs, strength, endurance, and balance impairments.  Continue plan of care for another 10 days as all goals are still appropriate and addressed below to meet patient's needs.  -CS       Row Name 12/27/24 1100          Positioning and Restraints    Pre-Treatment Position in bed  -CS     Post Treatment Position bed  -CS     In Bed side lying left;call light within reach;encouraged to call for assist;exit alarm on  with safety sitter  -CS       Row Name 12/27/24 1100          Therapy Assessment/Plan (PT)    Rehab Potential (PT) good  -CS     Criteria for Skilled Interventions Met (PT) yes;skilled treatment is necessary  -CS     Therapy Frequency (PT) daily  -CS     Predicted Duration of Therapy Intervention (PT) 10 days  -CS     Problem List (PT) problems related to;balance;cognition;mobility;strength  -CS     Activity Limitations Related to Problem List (PT) unable to transfer safely;unable to ambulate safely  -CS       Row Name 12/27/24 1100          PT Evaluation Complexity    History, PT Evaluation Complexity 1-2 personal factors and/or comorbidities  -CS     Examination of Body Systems (PT Eval Complexity) total of 3 or more elements  -CS     Clinical Presentation (PT Evaluation Complexity) stable  -CS     Clinical Decision Making (PT  Evaluation Complexity) low complexity  -CS     Overall Complexity (PT Evaluation Complexity) low complexity  -CS       Row Name 12/27/24 1100          Progress Summary (PT)    Progress Toward Functional Goals (PT) progress toward functional goals is fair  -CS       Row Name 12/27/24 1100          Therapy Plan Review/Discharge Plan (PT)    Therapy Plan Review (PT) care plan/treatment goals reviewed  -CS       Row Name 12/27/24 1100          Bed Mobility Goal 1 (PT)    [place] Level/Cues Needed (Bed Mobility Goal 1, PT) modified independence  -CS     Progress/Outcomes (Bed Mobility Goal 1, PT) goal ongoing;good progress toward goal;goal revised this date  -CS       Row Name 12/27/24 1100          Transfer Goal 1 (PT)    [place] Level/Cues Needed (Transfer Goal 1, PT) supervision required  -CS     Time Frame (Transfer Goal 1, PT) long term goal (LTG);10 days  -CS     Progress/Outcome (Transfer Goal 1, PT) goal ongoing;good progress toward goal;goal revised this date  -CS       Row Name 12/27/24 1100          Gait Training Goal 1 (PT)    [place] Level (Gait Training Goal 1, PT) supervision required  -CS     Distance (Gait Training Goal 1, PT) 225  -CS     Progress/Outcome (Gait Training Goal 1, PT) goal ongoing;good progress toward goal;goal revised this date  -CS               User Key  (r) = Recorded By, (t) = Taken By, (c) = Cosigned By      Initials Name Provider Type    Stan Esposito, HIREN Registered Nurse    Jackelin Freitas, RN Registered Nurse    Neena Weaver RN Registered Nurse    Mary Hinkle, PT Physical Therapist                    Physical Therapy Education       Title: PT OT SLP Therapies (In Progress)       Topic: Physical Therapy (In Progress)       Point: Mobility training (Done)       Learning Progress Summary            Patient Acceptance, E,TB, VU by AV at 12/17/2024 4833                      Point: Home exercise program (Not Started)       Learner Progress:  Not documented  in this visit.              Point: Body mechanics (Done)       Learning Progress Summary            Patient Acceptance, E,TB, VU by AV at 12/17/2024 1454                      Point: Precautions (Done)       Learning Progress Summary            Patient Acceptance, E,TB, VU by AV at 12/17/2024 1454                                      User Key       Initials Effective Dates Name Provider Type Discipline     06/11/21 -  Kaushik Chavez, PT Physical Therapist PT                  PT Recommendation and Plan  Anticipated Discharge Disposition (PT): inpatient rehabilitation facility, home with home health, home with 24/7 care  Planned Therapy Interventions (PT): balance training, bed mobility training, gait training, transfer training, strengthening  Therapy Frequency (PT): daily  Progress Summary (PT)  Progress Toward Functional Goals (PT): progress toward functional goals is fair  Plan of Care Reviewed With: patient  Progress:  (pt has shown improvements since initial evaluation with all functional mobility)  Outcome Evaluation: To optimize functional mobility and independence, skilled PT services needed to address mobility needs, strength, endurance, and balance impairments.  Continue plan of care for another 10 days as all goals are still appropriate and addressed below to meet patient's needs.   Outcome Measures       Row Name 12/25/24 1040             How much help from another person do you currently need...    Turning from your back to your side while in flat bed without using bedrails? 4  -SM      Moving from lying on back to sitting on the side of a flat bed without bedrails? 4  -SM      Moving to and from a bed to a chair (including a wheelchair)? 3  -SM      Standing up from a chair using your arms (e.g., wheelchair, bedside chair)? 3  -SM      Climbing 3-5 steps with a railing? 2  -SM      To walk in hospital room? 3  -SM      AM-PAC 6 Clicks Score (PT) 19  -SM                User Key  (r) = Recorded By,  (t) = Taken By, (c) = Cosigned By      Initials Name Provider Type    SM GarciaDana perkins, PTA Physical Therapist Assistant                     Time Calculation:    PT Charges       Row Name 12/27/24 1136             Time Calculation    PT Received On 12/27/24  -CS      PT Goal Re-Cert Due Date 01/05/25  -CS         Untimed Charges    PT Eval/Re-eval Minutes 15  -CS         Total Minutes    Untimed Charges Total Minutes 15  -CS       Total Minutes 15  -CS                User Key  (r) = Recorded By, (t) = Taken By, (c) = Cosigned By      Initials Name Provider Type    Mary Hinkle, PT Physical Therapist                  Therapy Charges for Today       Code Description Service Date Service Provider Modifiers Qty    [id number] HC PT RE-EVAL ESTABLISHED PLAN 2 12/27/2024 Mary Aparicio, PT GP 1            PT G-Codes  Outcome Measure Options: AM-PAC 6 Clicks Daily Activity (OT), Optimal Instrument  AM-PAC 6 Clicks Score (PT): 17  AM-PAC 6 Clicks Score (OT): 7    Mary Aparicio PT  12/27/2024

## 2024-12-27 NOTE — PLAN OF CARE
Goal Outcome Evaluation:  Plan of Care Reviewed With: patient        Progress: no change  Outcome Evaluation: Patient remains alert to self only. No complaints of pain throughout the shift. Skin care performed. Blood gluocose monitored. No new issues at this time.

## 2024-12-27 NOTE — PLAN OF CARE
Goal Outcome Evaluation:              Outcome Evaluation: Patient only alert to self, no complaints of any pain, skin care performed, sitter at bedside. No new issue at this time

## 2024-12-27 NOTE — PROGRESS NOTES
[facility]     Nephrology Progress Note      Patient Name: Nelson Wang  : 1945  MRN: [id number]  Primary Care Physician:  Domingo Bravo MD  Date of admission: 2024    Subjective   Subjective     Interval History:  No new events.  Somnolent, mildly restless. Sitter present.  Hard of hearing.     Objective   Objective     Vitals:   Temp:  [97.7 °F (36.5 °C)-98.1 °F (36.7 °C)] 97.7 °F (36.5 °C)  Heart Rate:  [] 91  Resp:  [16-20] 18  BP: (114-139)/(56-87) 125/87  Physical Exam:   constitutional: mildly restless, in bed.    Eyes: sclerae anicteric, no conjunctival injection   HENT: mucous membranes moist.     Neck: Supple, no thyromegaly, no lymphadenopathy, trachea midline, No JVD   Respiratory: Decreased to auscultation bilaterally, nonlabored respirations, some upper airway rhonchi.   Cardiovascular: RRR, no murmurs, rubs, or gallops.   Gastrointestinal: Positive bowel sounds, soft,  nondistended   Musculoskeletal: No edema, no clubbing or cyanosis.  Left upper extremity AV fistula, patent.   Neurologic: moving extremities, answering questions inconsistently   Skin: warm and dry, no rashes, areas of hypopigmentation.    Result Review    Result Reviewed:  I have personally reviewed the results from the time of this admission to 2024 12:24 EST and agree with these findings:  [x]  Laboratory  []  Microbiology  [x]  Radiology  []  EKG/Telemetry   []  Cardiology/Vascular   []  Pathology  [x]  Old records  []  Other:        Lab 24  0535 24  0810 24  0453 24  1547   SODIUM 133* 135* 133* 134*   POTASSIUM 4.2 4.5 4.4 4.9   CHLORIDE 94* 96* 93* 96*   CO2 25.1 26.4 23.9 23.0   BUN 22 41* 68* 62*   CREATININE 4.35* 5.33* 5.67* 4.89*   GLUCOSE 107* 98 98 118*   EGFR 13.2* 10.3* 9.6* 11.5*   ANION GAP 13.9 12.6 16.1* 15.0   MAGNESIUM 2.0  --   --  2.1   PHOSPHORUS  --   --  5.3* 5.1*             Assessment & Plan   Assessment / Plan       Active Hospital  Problems:  Active Hospital Problems    Diagnosis     **Hypoxia        Assessment and Plan:    - ESRD, was on home dialysis.  Left upper extremity AV fistula for access.  Electrolytes are stable.  Next dialysis tomorrow, UF 0.5 L as tolerated.       - Volume overload, much better now.         - Aspiration pneumonia, and possibly recurrent aspiration, per pulmonary.      - Type 2 diabetes with complications.     - Hypotension blood pressure is acceptable now, on ProAmatine and blood pressure stable.     - Anemia of chronic kidney disease, on RONALD as outpatient.  Getting Epogen while here.     - Altered mentation.  Multifactorial including possible dementia?,  May be slightly better today, Per primary.    Discussed with sitter.  Please call with any questions.    Will follow.

## 2024-12-28 ENCOUNTER — APPOINTMENT (OUTPATIENT)
Dept: GENERAL RADIOLOGY | Facility: HOSPITAL | Age: 79
End: 2024-12-28
Payer: MEDICARE

## 2024-12-28 ENCOUNTER — APPOINTMENT (OUTPATIENT)
Dept: CT IMAGING | Facility: HOSPITAL | Age: 79
End: 2024-12-28
Payer: MEDICARE

## 2024-12-28 PROBLEM — R78.81 GRAM-POSITIVE BACTEREMIA: Status: ACTIVE | Noted: 2024-12-28

## 2024-12-28 LAB
ALBUMIN SERPL-MCNC: 3.1 G/DL (ref 3.5–5.2)
ANION GAP SERPL CALCULATED.3IONS-SCNC: 18.7 MMOL/L (ref 5–15)
BACTERIA SPEC AEROBE CULT: NORMAL
BACTERIA SPEC AEROBE CULT: NORMAL
BUN SERPL-MCNC: 32 MG/DL (ref 8–23)
BUN/CREAT SERPL: 5.2 (ref 7–25)
CALCIUM SPEC-SCNC: 10.1 MG/DL (ref 8.6–10.5)
CHLORIDE SERPL-SCNC: 93 MMOL/L (ref 98–107)
CO2 SERPL-SCNC: 22.3 MMOL/L (ref 22–29)
CREAT SERPL-MCNC: 6.16 MG/DL (ref 0.76–1.27)
DEPRECATED RDW RBC AUTO: 67.4 FL (ref 37–54)
EGFRCR SERPLBLD CKD-EPI 2021: 8.7 ML/MIN/1.73
ERYTHROCYTE [DISTWIDTH] IN BLOOD BY AUTOMATED COUNT: 20.4 % (ref 12.3–15.4)
GLUCOSE BLDC GLUCOMTR-MCNC: 94 MG/DL (ref 70–99)
GLUCOSE SERPL-MCNC: 174 MG/DL (ref 65–99)
HCT VFR BLD AUTO: 30.5 % (ref 37.5–51)
HGB BLD-MCNC: 9.1 G/DL (ref 13–17.7)
MAGNESIUM SERPL-MCNC: 2.1 MG/DL (ref 1.6–2.4)
MCH RBC QN AUTO: 27.1 PG (ref 26.6–33)
MCHC RBC AUTO-ENTMCNC: 29.8 G/DL (ref 31.5–35.7)
MCV RBC AUTO: 90.8 FL (ref 79–97)
PHOSPHATE SERPL-MCNC: 5.8 MG/DL (ref 2.5–4.5)
PLATELET # BLD AUTO: 230 10*3/MM3 (ref 140–450)
PMV BLD AUTO: 10.1 FL (ref 6–12)
POTASSIUM SERPL-SCNC: 4.3 MMOL/L (ref 3.5–5.2)
RBC # BLD AUTO: 3.36 10*6/MM3 (ref 4.14–5.8)
SODIUM SERPL-SCNC: 134 MMOL/L (ref 136–145)
WBC NRBC COR # BLD AUTO: 5.6 10*3/MM3 (ref 3.4–10.8)

## 2024-12-28 PROCEDURE — 82948 REAGENT STRIP/BLOOD GLUCOSE: CPT | Performed by: INTERNAL MEDICINE

## 2024-12-28 PROCEDURE — 72125 CT NECK SPINE W/O DYE: CPT

## 2024-12-28 PROCEDURE — 94664 DEMO&/EVAL PT USE INHALER: CPT

## 2024-12-28 PROCEDURE — 94760 N-INVAS EAR/PLS OXIMETRY 1: CPT

## 2024-12-28 PROCEDURE — 94799 UNLISTED PULMONARY SVC/PX: CPT

## 2024-12-28 PROCEDURE — 80069 RENAL FUNCTION PANEL: CPT | Performed by: INTERNAL MEDICINE

## 2024-12-28 PROCEDURE — 99239 HOSP IP/OBS DSCHRG MGMT >30: CPT | Performed by: INTERNAL MEDICINE

## 2024-12-28 PROCEDURE — 85027 COMPLETE CBC AUTOMATED: CPT | Performed by: INTERNAL MEDICINE

## 2024-12-28 PROCEDURE — 83735 ASSAY OF MAGNESIUM: CPT | Performed by: INTERNAL MEDICINE

## 2024-12-28 PROCEDURE — 73070 X-RAY EXAM OF ELBOW: CPT

## 2024-12-28 PROCEDURE — 70450 CT HEAD/BRAIN W/O DYE: CPT

## 2024-12-28 PROCEDURE — 25010000002 LORAZEPAM PER 2 MG: Performed by: INTERNAL MEDICINE

## 2024-12-28 RX ORDER — LORAZEPAM 2 MG/ML
0.5 INJECTION INTRAMUSCULAR ONCE
Status: COMPLETED | OUTPATIENT
Start: 2024-12-28 | End: 2024-12-28

## 2024-12-28 RX ORDER — QUETIAPINE FUMARATE 25 MG/1
25 TABLET, FILM COATED ORAL NIGHTLY
Start: 2024-12-28 | End: 2025-01-08 | Stop reason: HOSPADM

## 2024-12-28 RX ADMIN — Medication 2.5 MG: at 23:36

## 2024-12-28 RX ADMIN — QUETIAPINE FUMARATE 25 MG: 25 TABLET ORAL at 23:36

## 2024-12-28 RX ADMIN — ACETAMINOPHEN 650 MG: 325 TABLET ORAL at 23:42

## 2024-12-28 RX ADMIN — ARFORMOTEROL TARTRATE 15 MCG: 15 SOLUTION RESPIRATORY (INHALATION) at 09:13

## 2024-12-28 RX ADMIN — BUDESONIDE 0.5 MG: 0.5 INHALANT ORAL at 09:13

## 2024-12-28 RX ADMIN — LORAZEPAM 0.5 MG: 2 INJECTION INTRAMUSCULAR; INTRAVENOUS at 18:19

## 2024-12-28 RX ADMIN — QUETIAPINE FUMARATE 12.5 MG: 25 TABLET ORAL at 14:25

## 2024-12-28 RX ADMIN — ACETAMINOPHEN 650 MG: 325 TABLET ORAL at 17:30

## 2024-12-28 RX ADMIN — LEVOTHYROXINE SODIUM 175 MCG: 0.03 TABLET ORAL at 07:52

## 2024-12-28 RX ADMIN — WHITE PETROLATUM 1 APPLICATION: 1.75 OINTMENT TOPICAL at 12:44

## 2024-12-28 NOTE — PROGRESS NOTES
[facility]     Nephrology Progress Note      Patient Name: Nelson Wang  : 1945  MRN: [id number]  Primary Care Physician:  Domingo Bravo MD  Date of admission: 2024    Subjective   Subjective     Interval History:  No new events.  Asleep, sitter at bedside.  I did not attempt to wake him up.  Reportedly more alert, less confused.  Tolerating p.o., did not eat much solid but drank fluid this morning.     Objective   Objective     Vitals:   Temp:  [97.3 °F (36.3 °C)-98.2 °F (36.8 °C)] 98.1 °F (36.7 °C)  Heart Rate:  [89-99] 93  Resp:  [16-20] 16  BP: (129-160)/(60-96) 153/83  Physical Exam:   constitutional: Asleep, mildly restless.    Eyes: sclerae anicteric, no conjunctival injection   HENT: mucous membranes moist.     Neck: Supple, no thyromegaly, no lymphadenopathy, trachea midline, No JVD   Respiratory: Decreased to auscultation bilaterally, nonlabored respirations, some upper airway rhonchi.   Cardiovascular: RRR, no murmurs, rubs, or gallops.   Gastrointestinal: Positive bowel sounds, soft,  nondistended   Musculoskeletal: No edema, no clubbing or cyanosis.  Left upper extremity AV fistula, patent.   Neurologic: moving extremities, reportedly answering questions inconsistently   Skin: warm and dry, no rashes, areas of hypopigmentation.    Result Review    Result Reviewed:  I have personally reviewed the results from the time of this admission to 2024 12:25 EST and agree with these findings:  [x]  Laboratory  []  Microbiology  [x]  Radiology  []  EKG/Telemetry   []  Cardiology/Vascular   []  Pathology  [x]  Old records  []  Other:        Lab 24  0921 24  0535 24  0810 24  0453 24  1547   SODIUM 134* 133* 135* 133* 134*   POTASSIUM 4.3 4.2 4.5 4.4 4.9   CHLORIDE 93* 94* 96* 93* 96*   CO2 22.3 25.1 26.4 23.9 23.0   BUN 32* 22 41* 68* 62*   CREATININE 6.16* 4.35* 5.33* 5.67* 4.89*   GLUCOSE 174* 107* 98 98 118*   EGFR 8.7* 13.2* 10.3* 9.6* 11.5*   ANION  GAP 18.7* 13.9 12.6 16.1* 15.0   MAGNESIUM 2.1 2.0  --   --  2.1   PHOSPHORUS 5.8*  --   --  5.3* 5.1*             Assessment & Plan   Assessment / Plan       Active Hospital Problems:  Active Hospital Problems    Diagnosis     **Hypoxia        Assessment and Plan:    - ESRD, was on home dialysis.  Left upper extremity AV fistula for access.  Electrolytes are stable.  Dialysis today, UF 0.5 L as tolerated.       - Volume overload, much better now.   Monitor closely.      - Aspiration pneumonia, and possibly recurrent aspiration, per pulmonary.      - Type 2 diabetes with complications.     - Hypotension blood pressure is acceptable now, on ProAmatine and blood pressure stable.     - Anemia of chronic kidney disease, on RONALD as outpatient.  Getting Epogen while here.     - Altered mentation.  Multifactorial including possible dementia?,  May be slightly better today, Per primary.    Discussed with sitter.  Please call with any questions.    Will follow.

## 2024-12-28 NOTE — PLAN OF CARE
Goal Outcome Evaluation:  Plan of Care Reviewed With: patient        Progress: no change  Outcome Evaluation: pt remains aox1 to self this shift. skin and wound care performed as ordered. new wound photos uploaded for discharge. pt currently in dialysis with daughter at bedside, plans to DC afterwards. VSS. BG monitored. up with walker and walked around unit this shift. no complaints of pain or discomfort

## 2024-12-28 NOTE — DISCHARGE SUMMARY
[facility]         HOSPITALIST  DISCHARGE SUMMARY    Patient Name: Nelson Wang  : 1945  MRN: [id number]    Date of Admission: 2024  Date of Discharge:  2024    Primary Care Physician: Domingo Bravo MD    Consults       Date and Time Order Name Status Description    2024  4:02 PM Inpatient Psychiatrist Consult      2024  8:48 AM Inpatient Psychiatrist Consult      2024  6:44 PM Inpatient Nephrology Consult Completed     2024  5:49 PM Inpatient Hospitalist Consult      2024  4:31 PM Inpatient Pulmonology Consult Completed     2024  4:31 PM Inpatient Nephrology Consult      2024  2:09 PM Inpatient Neurology Consult General Completed     11/3/2024  9:37 AM Inpatient Vascular Surgery Consult Completed     2024  6:43 AM Inpatient Infectious Diseases Consult Completed     2024  2:06 AM Inpatient Psychiatrist Consult Completed     2024  2:06 AM Inpatient Neurosurgery Consult Completed             Active and Resolved Hospital Problems:  Active Hospital Problems    Diagnosis POA    **Hypoxia [R09.02] Yes    Gram-positive bacteremia [R78.81] Unknown    ESRD (end stage renal disease) [N18.6] Yes    Essential hypertension [I10] Yes    Type 2 diabetes mellitus without complication [E11.9] Yes      Resolved Hospital Problems   No resolved problems to display.       Hospital Course     Hospital Course:  Nelson Wang is a 79 y.o. 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 Patient was started on supplemental oxygen to maintain his oxygen saturations. He was given diuresis.  His chest x-ray was consistent with bilateral pleural effusions. Pulmonology was consulted for the bilateral pleural effusions and and nephrology consulted for possible volume overload. Pulmonology  performed a thoracentesis which showed bloody fluid. Patient suffered CODE BLUE. Likely bleeding into his airways causing hypoxic arrest. He received CPR for 6 minutes and achieved ROSC. He was transferred to the ICU, intubated and on blood pressure support. Bronchoscopy was showing blood clots in his airways. Patient was started on CRRT. Patient started on micafungin on 12/7/2024. Patient extubated on 12/8/2024. Patient on high flow nasal cannula 30 L 25%.  Patient was able to be weaned down on his oxygen eventually to room air.  Patient continued with dialysis per nephrology.  Patient required Seroquel at night for sleep which she is on at home however dose was slightly decreased.  Patient was continued on midodrine on his hemodialysis days.  Patient did complete a course of his IV vancomycin for his discitis.  Patient did complete a course of IV micafungin also.  Patient's Keppra was discontinued as there was concern it was contributing to his decreased mentation.  Patient's EEG from his prior hospitalization and November did not show any seizure activity.  Patient's family desires to take him back home with home health services and do perform dialysis at home.  At this time patient will be discharged home today with his family in stable condition.  Patient will have home health services also.    DISCHARGE Follow Up Recommendations for labs and diagnostics:     Follow-up with primary care in 1 week      Day of Discharge     Vital Signs:  Temp:  [97.3 °F (36.3 °C)-98.2 °F (36.8 °C)] 97.3 °F (36.3 °C)  Heart Rate:  [89-99] 89  Resp:  [16-20] 16  BP: (129-160)/(60-96) 140/62  Physical Exam:   Gen: NAD, somewhat confused.  Sitter at the bedside  Cards: RRR  Pulm: No respiratory distress  Abd: soft, nondistended  Extremities: no pitting edema  Msk: full rom       Discharge Details        Discharge Medications        Changes to Medications        Instructions Start Date   QUEtiapine 25 MG tablet  Commonly known as:  SEROquel  What changed:   medication strength  how much to take  how to take this  when to take this  additional instructions   25 mg, Oral, Nightly             Continue These Medications        Instructions Start Date   atorvastatin 40 MG tablet  Commonly known as: LIPITOR   40 mg, Oral, Daily      Breztri Aerosphere 160-9-4.8 MCG/ACT aerosol inhaler  Generic drug: Budeson-Glycopyrrol-Formoterol   2 puffs, Inhalation, 2 Times Daily      famotidine 20 MG tablet  Commonly known as: Pepcid   20 mg, Oral, Daily      Levothyroxine Sodium 175 MCG capsule   175 mcg, Oral, Daily      midodrine 5 MG tablet  Commonly known as: PROAMATINE   5 mg      MIRCERA IJ   Once As Needed      sevelamer 0.8 g pack packet  Commonly known as: RENVELA   800 mg, 3 Times Daily With Meals             Stop These Medications      sertraline 100 MG tablet  Commonly known as: ZOLOFT     torsemide 100 MG tablet  Commonly known as: DEMADEX     VANCOMYCIN PHARMACY INTERMITTENT/PULSE DOSING              No Known Allergies    Discharge Disposition:  Home-Health Care [facility] [place]    Diet:  Hospital:  Diet Order   Procedures    Diet: Renal; Low Potassium; No Straw; Texture: Mechanical Ground (NDD 2); Fluid Consistency: Thin (IDDSI 0)       Discharge Activity: Up with assistance      CODE STATUS:  Code Status and Medical Interventions: No CPR (Do Not Attempt to Resuscitate); Full Support   Ordered at: 12/06/24 1256     Level Of Support Discussed With:    Next of Kin (If No Surrogate)     Code Status (Patient has no pulse and is not breathing):    No CPR (Do Not Attempt to Resuscitate)     Medical Interventions (Patient has pulse or is breathing):    Full Support         Future Appointments   Date Time Provider Department Center   1/9/2025  2:00 PM Stephan Nichols MD [facility] CD [facility]   1/10/2025 10:45 AM Domingo Bravo MD [facility] IMP [facility]   1/27/2025  9:00 AM Candace Das MD [facility] NS RA RA   2/13/2025 10:45 AM Sandra Espinal MD [facility] PCC ETW [facility]            Pertinent  and/or Most Recent Results     PROCEDURES:   See chart review as patient did have multiple procedures    LAB RESULTS:      Lab 12/28/24  0921 12/27/24  0535 12/23/24  0453 12/22/24  1547   WBC 5.60 5.59 6.27 7.04   HEMOGLOBIN 9.1* 9.3* 8.2* 8.5*   HEMATOCRIT 30.5* 31.4* 27.1* 27.5*   PLATELETS 230 244 245 222   NEUTROS ABS  --   --   --  3.80   IMMATURE GRANS (ABS)  --   --   --  0.13*   LYMPHS ABS  --   --   --  1.60   MONOS ABS  --   --   --  0.90   EOS ABS  --   --   --  0.54*   MCV 90.8 90.2 89.4 86.5   PROCALCITONIN  --   --   --  0.62*         Lab 12/28/24  0921 12/27/24  0535 12/25/24  0810 12/23/24  0453 12/22/24  1547   SODIUM 134* 133* 135* 133* 134*   POTASSIUM 4.3 4.2 4.5 4.4 4.9   CHLORIDE 93* 94* 96* 93* 96*   CO2 22.3 25.1 26.4 23.9 23.0   ANION GAP 18.7* 13.9 12.6 16.1* 15.0   BUN 32* 22 41* 68* 62*   CREATININE 6.16* 4.35* 5.33* 5.67* 4.89*   EGFR 8.7* 13.2* 10.3* 9.6* 11.5*   GLUCOSE 174* 107* 98 98 118*   CALCIUM 10.1 10.2 10.3 9.8 9.8   MAGNESIUM 2.1 2.0  --   --  2.1   PHOSPHORUS 5.8*  --   --  5.3* 5.1*         Lab 12/28/24  0921 12/23/24  0453 12/22/24  1547   ALBUMIN 3.1* 2.7* 3.2*                     Brief Urine Lab Results  (Last result in the past 365 days)        Color   Clarity   Blood   Leuk Est   Nitrite   Protein   CREAT   Urine HCG        10/27/24 1417 Yellow   Cloudy   Negative   Trace   Negative   >=300 mg/dL (3+)                 Microbiology Results (last 10 days)       Procedure Component Value - Date/Time    Blood Culture - Blood, Hand, Right [[id number]]  (Normal) Collected: 12/23/24 0453    Lab Status: Final result Specimen: Blood from Hand, Right Updated: 12/28/24 0530     Blood Culture No growth at 5 days    Narrative:      Less than seven (7) mL's of blood was collected.  Insufficient quantity may yield false negative results.    Blood Culture - Blood, Arm, Right [[id number]]  (Normal) Collected: 12/23/24 0453    Lab Status: Final result Specimen: Blood  from Arm, Right Updated: 12/28/24 0530     Blood Culture No growth at 5 days    Narrative:      Less than seven (7) mL's of blood was collected.  Insufficient quantity may yield false negative results.            XR Abdomen KUB    Result Date: 12/21/2024  Impression: 1.Enteric tube terminates in the left upper quadrant, with tip likely in the mid stomach. 2.Calcification at the right renal pelvis, as before. Electronically Signed: Enrique Maye  12/21/2024 12:22 PM EST  Workstation ID: [id number]    XR Chest 1 View    Result Date: 12/20/2024  Feeding tube extends well below the diaphragm. The tip is not seen. Electronically Signed: Cristian Tiwari MD  12/20/2024 10:38 PM EST  Workstation ID: [id number]      Results for orders placed during the hospital encounter of 11/01/24    Duplex Hemodialysis Access CAR    Interpretation Summary    Patent left arteriovenous fistula with normal volume flows.  Moderate arterial anastomotic and proximal inflow stenosis with chronic septum noted.  Otherwise widely patent fistula.      Results for orders placed during the hospital encounter of 11/01/24    Duplex Hemodialysis Access CAR    Interpretation Summary    Patent left arteriovenous fistula with normal volume flows.  Moderate arterial anastomotic and proximal inflow stenosis with chronic septum noted.  Otherwise widely patent fistula.      Results for orders placed during the hospital encounter of 12/05/24    Adult Transthoracic Echo Complete W/ Cont if Necessary Per Protocol    Interpretation Summary    The quality of the study is limited due to limited views obtained    Left ventricular systolic function is normal. Calculated left ventricular EF = 57.8%    The right ventricular cavity is mildly dilated.    The left atrial cavity is mildly dilated.    There is mild calcification of the aortic valve no evidence of vegetation seen      Labs Pending at Discharge:  Pending Labs       Order Current Status    AFB Culture - Body Fluid,  Pleural Cavity Preliminary result    Fungus Culture - Body Fluid, Pleural Cavity Preliminary result              Time spent on Discharge including face to face service:  more than 35 minutes    Electronically signed by Harshal Higuera DO, 12/28/24, 2:57 PM EST.

## 2024-12-28 NOTE — SIGNIFICANT NOTE
12/28/24 1100   OTHER   Discipline physical therapy assistant   Rehab Time/Intention   Session Not Performed patient/family declined treatment

## 2024-12-29 ENCOUNTER — APPOINTMENT (OUTPATIENT)
Dept: GENERAL RADIOLOGY | Facility: HOSPITAL | Age: 79
End: 2024-12-29
Payer: MEDICARE

## 2024-12-29 ENCOUNTER — APPOINTMENT (OUTPATIENT)
Dept: CT IMAGING | Facility: HOSPITAL | Age: 79
End: 2024-12-29
Payer: MEDICARE

## 2024-12-29 LAB
GLUCOSE BLDC GLUCOMTR-MCNC: 116 MG/DL (ref 70–99)
GLUCOSE BLDC GLUCOMTR-MCNC: 137 MG/DL (ref 70–99)
QT INTERVAL: 361 MS
QTC INTERVAL: 500 MS

## 2024-12-29 PROCEDURE — 97116 GAIT TRAINING THERAPY: CPT

## 2024-12-29 PROCEDURE — 97530 THERAPEUTIC ACTIVITIES: CPT

## 2024-12-29 PROCEDURE — 94799 UNLISTED PULMONARY SVC/PX: CPT

## 2024-12-29 PROCEDURE — 82948 REAGENT STRIP/BLOOD GLUCOSE: CPT

## 2024-12-29 PROCEDURE — 99233 SBSQ HOSP IP/OBS HIGH 50: CPT | Performed by: INTERNAL MEDICINE

## 2024-12-29 PROCEDURE — 73552 X-RAY EXAM OF FEMUR 2/>: CPT

## 2024-12-29 PROCEDURE — 74176 CT ABD & PELVIS W/O CONTRAST: CPT

## 2024-12-29 PROCEDURE — 97110 THERAPEUTIC EXERCISES: CPT

## 2024-12-29 PROCEDURE — 94664 DEMO&/EVAL PT USE INHALER: CPT

## 2024-12-29 PROCEDURE — 82948 REAGENT STRIP/BLOOD GLUCOSE: CPT | Performed by: INTERNAL MEDICINE

## 2024-12-29 PROCEDURE — 73560 X-RAY EXAM OF KNEE 1 OR 2: CPT

## 2024-12-29 RX ORDER — ACETAMINOPHEN 325 MG/1
650 TABLET ORAL EVERY 6 HOURS PRN
Status: DISCONTINUED | OUTPATIENT
Start: 2024-12-29 | End: 2025-01-08 | Stop reason: HOSPADM

## 2024-12-29 RX ADMIN — Medication 2.5 MG: at 21:03

## 2024-12-29 RX ADMIN — FAMOTIDINE 10 MG: 20 TABLET, FILM COATED ORAL at 10:25

## 2024-12-29 RX ADMIN — BUDESONIDE 0.5 MG: 0.5 INHALANT ORAL at 20:40

## 2024-12-29 RX ADMIN — ARFORMOTEROL TARTRATE 15 MCG: 15 SOLUTION RESPIRATORY (INHALATION) at 20:40

## 2024-12-29 RX ADMIN — QUETIAPINE FUMARATE 25 MG: 25 TABLET ORAL at 21:03

## 2024-12-29 RX ADMIN — ACETAMINOPHEN 650 MG: 325 TABLET ORAL at 06:02

## 2024-12-29 RX ADMIN — BUDESONIDE 0.5 MG: 0.5 INHALANT ORAL at 09:55

## 2024-12-29 RX ADMIN — DICLOFENAC SODIUM 2 G: 10 GEL TOPICAL at 15:37

## 2024-12-29 RX ADMIN — LEVOTHYROXINE SODIUM 175 MCG: 0.03 TABLET ORAL at 06:03

## 2024-12-29 RX ADMIN — ACETAMINOPHEN 650 MG: 325 TABLET ORAL at 16:23

## 2024-12-29 RX ADMIN — ARFORMOTEROL TARTRATE 15 MCG: 15 SOLUTION RESPIRATORY (INHALATION) at 09:54

## 2024-12-29 RX ADMIN — ACETAMINOPHEN 650 MG: 325 TABLET ORAL at 10:25

## 2024-12-29 RX ADMIN — WHITE PETROLATUM 1 APPLICATION: 1.75 OINTMENT TOPICAL at 10:26

## 2024-12-29 NOTE — PROGRESS NOTES
[facility]     Nephrology Progress Note      Patient Name: Nelson Wang  : 1945  MRN: [id number]  Primary Care Physician:  Domingo Bravo MD  Date of admission: 2024    Subjective   Subjective     Interval History:  Events noted.  Fell last night out of bed.  No apparent injury.  Still confused this morning, recognized me.  Daughter at bedside sitter at bedside.  No distress but seems restless.  Tolerating p.o.  Ambulated with physical therapy and a walker.    Objective   Objective     Vitals:   Temp:  [97.3 °F (36.3 °C)-98.3 °F (36.8 °C)] 98.2 °F (36.8 °C)  Heart Rate:  [] 97  Resp:  [16-20] 16  BP: ()/(37-85) 112/61  Physical Exam:   constitutional: Awake, alert, confused, hard of hearing, mildly restless.    Eyes: sclerae anicteric, no conjunctival injection   HENT: mucous membranes moist.     Neck: Supple, no thyromegaly, no lymphadenopathy, trachea midline, No JVD   Respiratory: Decreased to auscultation bilaterally, nonlabored respirations, some upper airway rhonchi.   Cardiovascular: RRR, no murmurs, rubs, or gallops.   Gastrointestinal: Positive bowel sounds, soft,  nondistended   Musculoskeletal: No edema, no clubbing or cyanosis.  Left upper extremity AV fistula, patent.   Neurologic: moving extremities, answering questions but inconsistently.  Incoherent at times.   Skin: warm and dry, no rashes, areas of hypopigmentation.    Result Review    Result Reviewed:  I have personally reviewed the results from the time of this admission to 2024 11:04 EST and agree with these findings:  [x]  Laboratory  []  Microbiology  [x]  Radiology  []  EKG/Telemetry   []  Cardiology/Vascular   []  Pathology  [x]  Old records  []  Other:        Lab 24  0921 24  0535 24  0810 24  0453 24  1547   SODIUM 134* 133* 135* 133* 134*   POTASSIUM 4.3 4.2 4.5 4.4 4.9   CHLORIDE 93* 94* 96* 93* 96*   CO2 22.3 25.1 26.4 23.9 23.0   BUN 32* 22 41* 68* 62*   CREATININE  6.16* 4.35* 5.33* 5.67* 4.89*   GLUCOSE 174* 107* 98 98 118*   EGFR 8.7* 13.2* 10.3* 9.6* 11.5*   ANION GAP 18.7* 13.9 12.6 16.1* 15.0   MAGNESIUM 2.1 2.0  --   --  2.1   PHOSPHORUS 5.8*  --   --  5.3* 5.1*             Assessment & Plan   Assessment / Plan       Active Hospital Problems:  Active Hospital Problems    Diagnosis     **Hypoxia     Gram-positive bacteremia     ESRD (end stage renal disease)     Essential hypertension     Type 2 diabetes mellitus without complication        Assessment and Plan:    - ESRD, was on home dialysis PTA, left upper extremity AV fistula for access.  Electrolytes are stable.  Dialysis yesterday, UF 0.5 L was tolerated.       - Volume overload, much better now.   Monitor closely.      - Aspiration pneumonia, and possibly recurrent aspiration, per pulmonary.      - Type 2 diabetes with complications.     - Hypotension blood pressure is acceptable now, on ProAmatine and blood pressure stable.     - Anemia of chronic kidney disease, on RONALD as outpatient.  Getting Epogen while here.     - Altered mentation.  Multifactorial including possible dementia?,  May be slightly better today, Per primary.    - Status post fall last night, no apparent injury, x-rays reviewed.  X-rays of lower extremity planned today.    Discussed with daughter, Dr. Wang, plan to take him home today.  Resume home hemodialysis on Tuesday with staff at the home clinic.    Please call with any questions.    Will follow.

## 2024-12-29 NOTE — SIGNIFICANT NOTE
At approximately 2100 last night 12/28/2024, thump was heard and pt was found down on floor. Pt had had ativan while in dialysis, and had been lethargic. Dr. Rudd contacted, as well as pt's daughter, Jose Wang. Pt c/o head, back, and right elbow pain. Head CT, C-spine CT, & right elbow xray ordered - unremarkable per radiologists notes. VSS, mentation at baseline, pupils ANUM. This morning pt c/o right hip pain - provider contacted and right hip xray ordered. Pt has been treated with prn tylenol per mar for pain.

## 2024-12-29 NOTE — SIGNIFICANT NOTE
12/29/24 0711   Physical Therapy Time and Intention   Session Not Performed   (Pt has orders for discharge home with home health today.)

## 2024-12-29 NOTE — PLAN OF CARE
Goal Outcome Evaluation:              Outcome Evaluation: remains aoxself. continued workup s/t fall, see results for xrays/CT. discharge cancelled at this time due to physical decline since fall. pt worked with patient, heavy x2-3 assist with walker, only able to take a few steps. fall risk measures in place and  at bedside due to restlessness and impulsiveness. BG monitored. skin and wound care provided as ordered.

## 2024-12-29 NOTE — NURSING NOTE
Duration of Treatment 3.0 Hours   Access Site AVF   Dialyzer Revaclear    mL/min   Dialysate Temperature (C) 36   Use Crit-Line? No   BFR-As tolerated to a maximum of: 400 mL/min   Prime Dialyzer With NS to Priming Volume? Yes   Dialysate Solution Bath: K+ = 3 mEq, Ca = 2.5mEq   Bicarb 30 mEq   Na+ 137 meq   Fluid Removal: 0.5kg     Pt completed 3 hour treatment, tolerated well. 300 mL removed.  VSS throughout. Post treatment report given to primary nurse, HIREN Sinha.

## 2024-12-29 NOTE — THERAPY TREATMENT NOTE
Acute Care - Physical Therapy Treatment Note  BRIA Sullivan     Patient Name: Nelson Wang  : 1945  MRN: [id number]  Today's Date: 2024      Visit Dx:     ICD-10-CM ICD-9-CM   1. Acute respiratory failure with hypoxia  J96.01 518.81   2. Acute pulmonary edema  J81.0 518.4   3. Difficulty walking  R26.2 719.7   4. Oropharyngeal dysphagia  R13.12 787.22     Patient Active Problem List   Diagnosis    Essential hypertension    Bradycardia, sinus    Anemia due to chronic kidney disease    Hypothyroidism    Idiopathic gout    Proteinuria    Psoriasis    Thrombocytopenia    Type 2 diabetes mellitus without complication    CKD (chronic kidney disease), stage IV    Hyperlipidemia    Monoclonal gammopathy of unknown significance (MGUS)    Stage 5 chronic kidney disease    Chronic gout without tophus    Peritoneal dialysis catheter dysfunction    Medicare annual wellness visit, subsequent    Chronic cough    Peritonitis associated with peritoneal dialysis    Recurrent pneumonia    Acute on chronic respiratory failure with hypoxia    ESRD (end stage renal disease)    Aspiration pneumonitis    Sepsis    Type 2 diabetes mellitus, with long-term current use of insulin    GERD without esophagitis    Immunosuppression due to drug therapy    Bipolar disorder    Chronic respiratory failure with hypoxia    Shortness of breath    Abnormal stress test    ESRD on dialysis    Abnormal chest CT    Encounter for screening for malignant neoplasm of colon    History of colon polyps    Family history of colon cancer    Intractable pain    Abdominal pain    ESRD (end stage renal disease) on dialysis    AMS (altered mental status)    Hallucinations    LUQ pain    Discitis    Metabolic encephalopathy    Aspiration pneumonia    Discitis of lumbar region    Severe malnutrition    Dementia    Unspecified severe protein-calorie malnutrition    Hypoxia    Gram-positive bacteremia     Past Medical History:   Diagnosis Date    Abnormal stress  test     Anemia     IRON INFUSIONS WITH DIALYSIS    Anesthesia     WITH HIP REPLACEMENT DAUGHTER BELIEVES HAD SVT 2000    Ankle pain, right     Anxiety and depression     Arthritis     CKD (chronic kidney disease), stage IV     DIALYSIS [id number] SHILEY IN RIGHT CHEST    Diabetes     GERD (gastroesophageal reflux disease)     Gout     High cholesterol     History of peritoneal dialysis     HL (hearing loss)     Hyperkalemia     Hypertension     Hypothyroidism     Insomnia     Night terrors     Psoriasis     Psoriasis     Sleep apnea     Sleep walking     Thrombocytopenia     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    Vitiligo      Past Surgical History:   Procedure Laterality Date    ABDOMINAL SURGERY      ANKLE SURGERY Right 11/1990    APPENDECTOMY N/A 1954    ARTERIOVENOUS FISTULA/SHUNT SURGERY Left 07/16/2024    Procedure: Creation of left arm arteriovenous fistula;  Surgeon: Moses Mir MD;  Location: [facility] MAIN OR;  Service: Vascular;  Laterality: Left;    BRONCHOSCOPY N/A 03/01/2024    Procedure: BRONCHOSCOPY WITH BAL AND WASHINGS;  Surgeon: Sandra Espinal MD;  Location: [facility] ENDOSCOPY;  Service: Pulmonary;  Laterality: N/A;  PNEUMONIA    BRONCHOSCOPY N/A 08/30/2024    Procedure: BRONCHOSCOPY WITH BALS AND WASHINGS;  Surgeon: Sandra Espinal MD;  Location: [facility] ENDOSCOPY;  Service: Pulmonary;  Laterality: N/A;  MUCOUS PLUGGING    CARDIAC CATHETERIZATION N/A 05/29/2024    Procedure: Left Heart Cath with possible coronary angioplasty;  Surgeon: Stephan Nichols MD;  Location: [facility] CATH INVASIVE LOCATION;  Service: Cardiology;  Laterality: N/A;    COLONOSCOPY N/A 06/2022    [facility]    ENDOSCOPY N/A 10/28/2024    Procedure: ESOPHAGOGASTRODUODENOSCOPY INSERTION OF LIGHTED INSTRUMENT TO VIEW ESOPHAGUS, STOMACH AND SMALL INTESTINE;  Surgeon: Kingsley Peraza MD;  Location: [facility] ENDOSCOPY;  Service: Gastroenterology;  Laterality: N/A;  Gastritis    FRACTURE SURGERY      HIP BIPOLAR  REPLACEMENT Right 01/2000    INSERTION HEMODIALYSIS CATHETER Left 04/09/2024    Procedure: HEMODIALYSIS CATHETER INSERTION;  Surgeon: Jose Berry MD;  Location: [facility] MAIN OR;  Service: General;  Laterality: Left;    INSERTION HEMODIALYSIS CATHETER N/A 04/12/2024    Procedure: Tunneled hemodialysis catheter insertion;  Surgeon: Enrique Vinson MD;  Location: [facility] MAIN OR;  Service: Vascular;  Laterality: N/A;    INSERTION PERITONEAL DIALYSIS CATHETER N/A 03/27/2023    Procedure: LAPAROSCOPIC INSERTION PERITONEAL DIALYSIS CATHETER, LAPAROSCOPIC OMENTOPEXY WITH LYSIS OF ADHESIONS;  Surgeon: Jose Berry MD;  Location: [facility] MAIN OR;  Service: General;  Laterality: N/A;    INSERTION PERITONEAL DIALYSIS CATHETER Left 07/23/2023    Procedure: REVISION OF PERITONEAL DIALYSIS CATHETER;  Surgeon: Radha Oreilly MD;  Location: [facility] MAIN OR;  Service: General;  Laterality: Left;    JOINT REPLACEMENT      REMOVAL PERITONEAL DIALYSIS CATHETER N/A 04/09/2024    Procedure: REMOVAL PERITONEAL DIALYSIS CATHETER;  Surgeon: Jose Berry MD;  Location: [facility] MAIN OR;  Service: General;  Laterality: N/A;    RENAL BIOPSY Left 07/15/2022    UPPER GASTROINTESTINAL ENDOSCOPY      VASCULAR SURGERY      WRIST SURGERY      UNSURE WHICH SIDE DAUGHTER REPORTS HAD SEVERE  CUT FROM WINDOW AND THEY HAD TO DO RECONSTRUCTIVE SURGERY WITH VESSELS AND NERVES     PT Assessment (Last 12 Hours)       PT Evaluation and Treatment       Row Name 12/29/24 0858 12/29/24 0711       Physical Therapy Time and Intention    Subjective Information complains of;weakness;fatigue;pain  -DK --    Document Type therapy note (daily note)  -DK --    Mode of Treatment individual therapy;physical therapy  -DK --    Session Not Performed -- --  Pt has orders for discharge home with home health today.  -DK    Patient Effort good  -DK --    Symptoms Noted During/After Treatment fatigue;increased pain  -DK --    Comment Per  daughter, pt did not sleep well overnight, has arthritis all over his body, had a fall on 12/28/24, and was c/o right hip pain.  Pt tolerated exercises, transfers and a short ambulation distance.  Pt may benefit from an x-ray to r/o fracture prior to discharge home today. Sitter in room. Daughter requested treatment prior to discharge today.  -DK --      Row Name 12/29/24 0858          Pain    Pretreatment Pain Rating 0/10 - no pain  -DK     Posttreatment Pain Rating 3/10  -DK     Pain Location hip  -DK     Pain Side/Orientation right;generalized  -DK     Pain Management Interventions exercise or physical activity utilized  -DK     Response to Pain Interventions activity participation with increased pain  -DK       Row Name 12/29/24 0858          Cognition    Affect/Mental Status (Cognition) confused;low arousal/lethargic  -DK     Orientation Status (Cognition) oriented to;person  -DK     Follows Commands (Cognition) physical/tactile prompts required;repetition of directions required;verbal cues/prompting required  -DK     Cognitive Function (Cognition) attention deficit  -DK     Attention Deficit (Cognition) minimal deficit;moderate deficit;concentration;focused/sustained attention;arousal/alertness  -DK     Personal Safety Interventions gait belt;nonskid shoes/slippers when out of bed;supervised activity  -DK       Row Name 12/29/24 0858          Bed Mobility    Bed Mobility scooting/bridging;supine-sit;sit-supine  -DK     All Activities, Hurt (Bed Mobility) moderate assist (50% patient effort);1 person assist;2 person assist  -DK     Rolling Left Hurt (Bed Mobility) moderate assist (50% patient effort);1 person assist  -DK     Rolling Right Hurt (Bed Mobility) moderate assist (50% patient effort);1 person assist  -DK     Scooting/Bridging Hurt (Bed Mobility) moderate assist (50% patient effort);2 person assist  -DK     Supine-Sit Hurt (Bed Mobility) moderate assist (50%  patient effort);1 person assist  -DK     Sit-Supine [place] (Bed Mobility) moderate assist (50% patient effort);1 person assist;2 person assist  -DK     Bed Mobility, Safety Issues cognitive deficits limit understanding;decreased use of arms for pushing/pulling;decreased use of legs for bridging/pushing  -DK     Assistive Device (Bed Mobility) repositioning sheet  -DK       Row Name 12/29/24 0858          Transfers    Transfers sit-stand transfer;stand-sit transfer  -DK       Row Name 12/29/24 0858          Sit-Stand Transfer    Sit-Stand [place] (Transfers) minimum assist (75% patient effort);moderate assist (50% patient effort);1 person assist  -DK     Assistive Device (Sit-Stand Transfers) walker, front-wheeled  -DK       Row Name 12/29/24 0858          Stand-Sit Transfer    Stand-Sit [place] (Transfers) minimum assist (75% patient effort);moderate assist (50% patient effort);1 person assist  -DK     Assistive Device (Stand-Sit Transfers) walker, front-wheeled  -DK       Row Name 12/29/24 0858          Gait/Stairs (Locomotion)    Gait/Stairs Locomotion gait/ambulation independence;gait/ambulation assistive device;distance ambulated;gait pattern  -DK     [place] Level (Gait) minimum assist (75% patient effort);1 person assist  -DK     Assistive Device (Gait) walker, front-wheeled  -DK     Distance in Feet (Gait) 20  -DK     Pattern (Gait) step-to  -DK     Deviations/Abnormal Patterns (Gait) marguerite decreased;festinating/shuffling;gait speed decreased;stride length decreased  -DK     Bilateral Gait Deviations forward flexed posture  -DK     Comment, (Gait/Stairs) Pt ambulated on room air with a rolling walker.  He exhbited a bent posture. Pt was able to support his weight with the right leg, but was limping.  Pt's knees began buckling somewhat at 20' and he was placed in the wheelchair and  returned to bedside.  Pt was able to stand and ambulate a few steps with assist x 2,wheelchair to bed.  He  returned to bed on alert post treatment.  -       Row Name 12/29/24 0858          Safety Issues/Impairments Affecting Functional Mobility    Safety Issues Affecting Function (Mobility) ability to follow commands;awareness of need for assistance;impulsivity;judgment;safety precaution awareness  -DK     Impairments Affecting Function (Mobility) balance;cognition;endurance/activity tolerance;pain;strength  -DK     Cognitive Impairments, Mobility Safety/Performance attention;judgment;impulsivity;awareness, need for assistance;safety precaution awareness  -       Row Name 12/29/24 0858          Balance    Balance Assessment sitting static balance;sitting dynamic balance;standing static balance;standing dynamic balance  -DK     Static Sitting Balance contact guard;1-person assist  -DK     Dynamic Sitting Balance contact guard;1-person assist  -DK     Position, Sitting Balance supported;sitting in chair;sitting edge of bed  -DK     Static Standing Balance minimal assist;1-person assist  -DK     Dynamic Standing Balance minimal assist;1-person assist  -DK     Position/Device Used, Standing Balance walker, front-wheeled  -DK     Balance Interventions standing;dynamic;supported;tandem gait;tandem standing;static  -       Row Name 12/29/24 0858          Motor Skills    Motor Skills --  therapeutic exercises  -DK     Therapeutic Exercise hip;knee;ankle  -DK     Additional Documentation --  Pt did exhibit minor discomfort initially with movement of the right hip during the first 2-3 repetitions, then did not appear to have discomfort for the remainder of the repetitions.  -       Row Name 12/29/24 0858          Hip (Therapeutic Exercise)    Hip (Therapeutic Exercise) AAROM (active assistive range of motion)  -     Hip AAROM (Therapeutic Exercise) bilateral;flexion;extension;aBduction;aDduction;supine;10 repetitions;2 sets  -       Row Name 12/29/24 0858          Knee (Therapeutic Exercise)    Knee (Therapeutic  Exercise) AAROM (active assistive range of motion)  -     Knee AAROM (Therapeutic Exercise) bilateral;flexion;extension;supine;10 repetitions;2 sets  -DK       Row Name 12/29/24 0858          Ankle (Therapeutic Exercise)    Ankle (Therapeutic Exercise) AAROM (active assistive range of motion)  -DK     Ankle AAROM (Therapeutic Exercise) bilateral;dorsiflexion;plantarflexion;supine;10 repetitions;2 sets  -DK       Row Name             Wound 11/15/24 1945 Right upper chest    Wound - Properties Group Placement Date: 11/15/24  -JR Placement Time: 1945 -JR Side: Right  -JR Orientation: upper  -JR Location: chest  -JR Primary Wound Type: Incision  -JR    Retired Wound - Properties Group Placement Date: 11/15/24  -JR Placement Time: 1945 -JR Side: Right  -JR Orientation: upper  -JR Location: chest  -JR Primary Wound Type: Incision  -JR    Retired Wound - Properties Group Placement Date: 11/15/24  -JR Placement Time: 1945 -JR Side: Right  -JR Orientation: upper  -JR Location: chest  -JR Primary Wound Type: Incision  -JR    Retired Wound - Properties Group Date first assessed: 11/15/24  -JR Time first assessed: 1945  -JR Side: Right  -JR Location: chest  -JR Primary Wound Type: Incision  -JR      Row Name             Wound 12/05/24 2331 Right lower leg abrasion    Wound - Properties Group Placement Date: 12/05/24  -AW Placement Time: 2331 -AW Side: Right  -AW Orientation: lower  -AW Location: leg  -AW Primary Wound Type: Abrasion  -AW Type: abrasion  -AW Present on Original Admission: Y  -AW    Retired Wound - Properties Group Placement Date: 12/05/24  -AW Placement Time: 2331 -AW Present on Original Admission: Y  -AW Side: Right  -AW Orientation: lower  -AW Location: leg  -AW Primary Wound Type: Abrasion  -AW Type: abrasion  -AW    Retired Wound - Properties Group Placement Date: 12/05/24  -AW Placement Time: 2331 -AW Present on Original Admission: Y  -AW Side: Right  -AW Orientation: lower  -AW Location: leg  -AW  Primary Wound Type: Abrasion  -AW Type: abrasion  -AW    Retired Wound - Properties Group Date first assessed: 12/05/24  -AW Time first assessed: 2331  -AW Present on Original Admission: Y  -AW Side: Right  -AW Location: leg  -AW Primary Wound Type: Abrasion  -AW Type: abrasion  -AW      Row Name             Wound 12/06/24 1440 Left lower leg skin tear    Wound - Properties Group Placement Date: 12/06/24  -KE Placement Time: 1440  -KE Side: Left  -KE Orientation: lower  -KE Location: leg  -KE Primary Wound Type: Skin tear  -KE Type: skin tear  -KE    Retired Wound - Properties Group Placement Date: 12/06/24  -KE Placement Time: 1440  -KE Side: Left  -KE Orientation: lower  -KE Location: leg  -KE Primary Wound Type: Skin tear  -KE Type: skin tear  -KE    Retired Wound - Properties Group Placement Date: 12/06/24  -KE Placement Time: 1440  -KE Side: Left  -KE Orientation: lower  -KE Location: leg  -KE Primary Wound Type: Skin tear  -KE Type: skin tear  -KE    Retired Wound - Properties Group Date first assessed: 12/06/24  -KE Time first assessed: 1440  -KE Side: Left  -KE Location: leg  -KE Primary Wound Type: Skin tear  -KE Type: skin tear  -KE      Row Name             Wound 12/28/24 2100 Right posterior elbow skin tear    Wound - Properties Group Placement Date: 12/28/24  -KM Placement Time: 2100  -KM Side: Right  -KM Orientation: posterior  -KM Location: elbow  -KM Primary Wound Type: Skin tear  -KM Type: skin tear  -KM    Retired Wound - Properties Group Placement Date: 12/28/24  -KM Placement Time: 2100  -KM Side: Right  -KM Orientation: posterior  -KM Location: elbow  -KM Primary Wound Type: Skin tear  -KM Type: skin tear  -KM    Retired Wound - Properties Group Placement Date: 12/28/24  -KM Placement Time: 2100  -KM Side: Right  -KM Orientation: posterior  -KM Location: elbow  -KM Primary Wound Type: Skin tear  -KM Type: skin tear  -KM    Retired Wound - Properties Group Date first assessed: 12/28/24  -KM Time  first assessed: 2100  -KM Side: Right  -KM Location: elbow  -KM Primary Wound Type: Skin tear  - Type: skin tear  -KM      Row Name 12/29/24 0858          Plan of Care Review    Plan of Care Reviewed With patient;daughter  -     Progress improving  -       Row Name 12/29/24 0858          Positioning and Restraints    Pre-Treatment Position in bed  -DK     Post Treatment Position bed  -DK     In Bed supine;call light within reach;encouraged to call for assist;exit alarm on;with family/caregiver;patient within staff view;side rails up x3;with other staff;legs elevated  -DK       Row Name 12/29/24 0858          Therapy Assessment/Plan (PT)    Rehab Potential (PT) good  -DK     Criteria for Skilled Interventions Met (PT) skilled treatment is necessary  -DK     Therapy Frequency (PT) daily  -DK     Problem List (PT) problems related to;balance;cognition;mobility;range of motion (ROM);strength;pain;hearing;communication  -DK     Activity Limitations Related to Problem List (PT) unable to ambulate safely;unable to transfer safely  -       Row Name 12/29/24 0858          Progress Summary (PT)    Progress Toward Functional Goals (PT) progress toward functional goals is good  -DK               User Key  (r) = Recorded By, (t) = Taken By, (c) = Cosigned By      Initials Name Provider Type    JR Stan Henderson, HIREN Registered Nurse    Jackelin Freitas, RN Registered Nurse    Neena Weaver, RN Registered Nurse    Farzana Ma, RN Registered Nurse    Lia Canales, GREER Physical Therapist Assistant                    Physical Therapy Education       Title: PT OT SLP Therapies (In Progress)       Topic: Physical Therapy (In Progress)       Point: Mobility training (Done)       Learning Progress Summary            Patient Acceptance, E,TB, VU by AV at 12/17/2024 4101                      Point: Home exercise program (Not Started)       Learner Progress:  Not documented in this visit.              Point: Body  mechanics (Done)       Learning Progress Summary            Patient Acceptance, E,TB, VU by AV at 12/17/2024 1454                      Point: Precautions (Done)       Learning Progress Summary            Patient Acceptance, E,TB, VU by AV at 12/17/2024 1454                                      User Key       Initials Effective Dates Name Provider Type Discipline     06/11/21 -  Kaushik Chavez, PT Physical Therapist PT                  PT Recommendation and Plan  Planned Therapy Interventions (PT): balance training, bed mobility training, gait training, strengthening, transfer training  Therapy Frequency (PT): daily  Progress Summary (PT)  Progress Toward Functional Goals (PT): progress toward functional goals is good  Plan of Care Reviewed With: patient, daughter  Progress: improving   Outcome Measures       Row Name 12/29/24 0858             How much help from another person do you currently need...    Turning from your back to your side while in flat bed without using bedrails? 3  -DK      Moving from lying on back to sitting on the side of a flat bed without bedrails? 3  -DK      Moving to and from a bed to a chair (including a wheelchair)? 2  -DK      Standing up from a chair using your arms (e.g., wheelchair, bedside chair)? 2  -DK      Climbing 3-5 steps with a railing? 2  -DK      To walk in hospital room? 2  -DK      AM-PAC 6 Clicks Score (PT) 14  -DK         Functional Assessment    Outcome Measure Options AM-PAC 6 Clicks Basic Mobility (PT)  -DK                User Key  (r) = Recorded By, (t) = Taken By, (c) = Cosigned By      Initials Name Provider Type    Lia Canales PTA Physical Therapist Assistant                     Time Calculation:    PT Charges       Row Name 12/29/24 0909 12/29/24 0712          Time Calculation    PT Received On 12/29/24  -DK 12/29/24  -DK     PT Goal Re-Cert Due Date 01/05/25  -DK 01/05/25  -DK        Timed Charges    39706 - PT Therapeutic Exercise Minutes 14  -DK --      50576 - Gait Training Minutes  8  -DK --     06405 - PT Therapeutic Activity Minutes 18  -DK --        Total Minutes    Timed Charges Total Minutes 40  -DK --      Total Minutes 40  -DK --               User Key  (r) = Recorded By, (t) = Taken By, (c) = Cosigned By      Initials Name Provider Type    Lia Canales PTA Physical Therapist Assistant                  Therapy Charges for Today       Code Description Service Date Service Provider Modifiers Qty    10189872517 HC PT THER PROC EA 15 MIN 12/29/2024 Lia Escamilla PTA GP 1    34128714080 HC GAIT TRAINING EA 15 MIN 12/29/2024 Lia Escamilla, GREER GP 1    87252181323 HC PT THERAPEUTIC ACT EA 15 MIN 12/29/2024 Lia Escamilla, GREER GP 1            PT G-Codes  Outcome Measure Options: AM-PAC 6 Clicks Basic Mobility (PT)  AM-PAC 6 Clicks Score (PT): 14  AM-PAC 6 Clicks Score (OT): 7    Lia Escamilla PTA  12/29/2024

## 2024-12-29 NOTE — PROGRESS NOTES
[facility]   Hospitalist Progress Note  Date: 2024  Patient Name: Nelson Wang  : 1945  MRN: [id number]  Date of admission: 2024  Room/Bed: [facility]      Subjective   Subjective     Chief Complaint: Shortness of breath    Summary:Nelson Wang is a 79 y.o. male ESRD on dialysis, type 2 diabetes, dementia, discitis on vancomycin, A-fib, restrictive lung disease who presents to the emergency department for evaluation of hypoxia and shortness of breath. Patient was started on supplemental oxygen to maintain his oxygen saturations. He was given a dose of Bumex overnight. His chest x-ray was consistent with bilateral pleural effusions. Pulmonology was consulted for the bilateral pleural effusions and and nephrology consulted for possible volume overload. Pulmonology was performing a thoracentesis this morning. Thoracentesis was showing bloody fluid. Patient suffered CODE BLUE. Likely bleeding into his airways causing hypoxic arrest. He received CPR for 6 minutes and achieved ROSC. He was transferred to the ICU, intubated and on blood pressure support. Bronchoscopy was showing blood clots in his airways. Patient was started on CRRT. Patient's medical status is tenuous. He is currently on 1 pressor. CRRT is removing fluid at a slow rate. Patient started on micafungin on 2024. Patient extubated on 2024. Patient on high flow nasal cannula 30 L 25%. Slowly weaning down on supplemental oxygen, breathing more comfortably. Poor mental status still waxing and waning suspect underlying dementia with history of alcohol abuse, mixed with hospital delirium and hyperactive delirium.  Patient continues on hemodialysis with midodrine on dialysis days has required extra Seroquel and occasional as needed medications for sedation to allow him to tolerate hemodialysis.  Working on mentation and trying to get back home ultimately.     Patient has completed IV Vanco and micafungin course while in the  "hospital.    Interval Followup:    Patient was set to be discharged home on December 28 after dialysis.  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  After dialysis family requested that patient stay a couple hours to \"sleep off \"the Ativan.  During that time patient had a fall.  Per nursing note it stated that the thump was hard and patient was found down on the floor.  Overnight physician was contacted as well as patient's daughter Dr. Wang.  Patient had head CT as well as cervical spine CT and right elbow x-ray which were unremarkable.  However this morning patient is stating a lot of pain with ambulation and pain in his legs.  Patient had right knee and femur x-ray which were negative for any fracture.  Currently patient has a CT of the pelvis pending.  At this time family is now requesting patient not discharged home and be evaluated for rehab placement.    Today on patient complains of pain in his legs with ambulation. More so on the right leg.   Vitals remain stable     Review of Systems    All systems reviewed and negative except for what is outlined above.      Objective   Objective     Vitals:   Temp:  [97.9 °F (36.6 °C)-98.3 °F (36.8 °C)] 98.3 °F (36.8 °C)  Heart Rate:  [] 100  Resp:  [16-20] 16  BP: ()/(37-85) 130/44    Physical Exam   Gen: NAD, somewhat confused.  Sitter at the bedside and family   Cards: RRR  Pulm: No respiratory distress  Abd: soft, nondistended  Extremities: no pitting edema  Msk: pain with ambulation. Patient in hips bilaterally. Pain in knees     Result Review    Result Review:  I have personally reviewed these results:  [x]  Laboratory      Lab 12/28/24  0921 12/27/24  0535 12/23/24  0453 12/22/24  1547   WBC 5.60 5.59 6.27 7.04   HEMOGLOBIN 9.1* 9.3* 8.2* 8.5*   HEMATOCRIT 30.5* 31.4* 27.1* 27.5*   PLATELETS 230 244 245 222   NEUTROS ABS  --   --   --  3.80 "   IMMATURE GRANS (ABS)  --   --   --  0.13*   LYMPHS ABS  --   --   --  1.60   MONOS ABS  --   --   --  0.90   EOS ABS  --   --   --  0.54*   MCV 90.8 90.2 89.4 86.5   PROCALCITONIN  --   --   --  0.62*         Lab 12/28/24  0921 12/27/24  0535 12/25/24  0810 12/23/24  0453 12/22/24  1547   SODIUM 134* 133* 135* 133* 134*   POTASSIUM 4.3 4.2 4.5 4.4 4.9   CHLORIDE 93* 94* 96* 93* 96*   CO2 22.3 25.1 26.4 23.9 23.0   ANION GAP 18.7* 13.9 12.6 16.1* 15.0   BUN 32* 22 41* 68* 62*   CREATININE 6.16* 4.35* 5.33* 5.67* 4.89*   EGFR 8.7* 13.2* 10.3* 9.6* 11.5*   GLUCOSE 174* 107* 98 98 118*   CALCIUM 10.1 10.2 10.3 9.8 9.8   MAGNESIUM 2.1 2.0  --   --  2.1   PHOSPHORUS 5.8*  --   --  5.3* 5.1*         Lab 12/28/24  0921 12/23/24  0453 12/22/24  1547   ALBUMIN 3.1* 2.7* 3.2*                       Brief Urine Lab Results  (Last result in the past 365 days)        Color   Clarity   Blood   Leuk Est   Nitrite   Protein   CREAT   Urine HCG        10/27/24 1417 Yellow   Cloudy   Negative   Trace   Negative   >=300 mg/dL (3+)                 [x]  Microbiology   Microbiology Results (last 10 days)       Procedure Component Value - Date/Time    Blood Culture - Blood, Hand, Right [[id number]]  (Normal) Collected: 12/23/24 0453    Lab Status: Final result Specimen: Blood from Hand, Right Updated: 12/28/24 0530     Blood Culture No growth at 5 days    Narrative:      Less than seven (7) mL's of blood was collected.  Insufficient quantity may yield false negative results.    Blood Culture - Blood, Arm, Right [[id number]]  (Normal) Collected: 12/23/24 0453    Lab Status: Final result Specimen: Blood from Arm, Right Updated: 12/28/24 9530     Blood Culture No growth at 5 days    Narrative:      Less than seven (7) mL's of blood was collected.  Insufficient quantity may yield false negative results.          [x]  Radiology  XR Knee 1 or 2 View Right    Result Date: 12/29/2024  Impression: Negative for displaced fracture or joint  malalignment involving right femur or knee. Radiographically uncomplicated hip prosthesis. Electronically Signed: Lorne Bustamante MD  12/29/2024 11:11 AM EST  Workstation ID: [id number]    XR Femur 2 View Right    Result Date: 12/29/2024  Impression: Negative for displaced fracture or joint malalignment involving right femur or knee. Radiographically uncomplicated hip prosthesis. Electronically Signed: Lorne Bustamante MD  12/29/2024 11:11 AM EST  Workstation ID: [id number]    XR Elbow 2 View Right    Result Date: 12/28/2024  No acute fracture or acute malalignment is identified.   Portions of this note were completed with a voice recognition program.  Electronically Signed By-Stan Hogan MD On:12/28/2024 11:33 PM     []  EKG/Telemetry   []  Cardiology/Vascular   []  Pathology  []  Old records  []  Other:    Assessment & Plan   Assessment / Plan     Assessment/Plan (clinically significant if listed here)  Cardiac arrest secondary to hypoxemia in setting of thoracentesis  Acute hypoxic/hypercapnic respiratory failure requiring mechanical ventilation  Acute metabolic encephalopathy/altered mental status, initial concern for seizures, EEG negative x 2  Acute on chronic diastolic heart failure  Acute cardiogenic pulmonary edema  Bilateral pleural effusions  Hemoptysis  Candidemia s/p iv micafungin course completed 12/22/24  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  Blood pressure is doing okay on nondialysis days   Off oxygen currently, better after had further volume remove.  Continue to monitor closely for further aspiration, continue with  aspiration cautions and adjusted diet per speech.    discussed with ID previously, has completed IV vancomycin course for staph epi discitis, apparently Dr. Sigala (ID in [place]) saw him and 6 week course would be completed 12/14.  iv micafungin 14d course completed 12/22  EEG from prior hospitalization 11/15 and 12/10 without seizure activity noted on limited study.  Keppra has been dc'd given concern of contribution to mentation.  Will continue to monitor without this.    12/1 ct head had a small hematoma, no acute abnormality, has generalized parenchymal volume loss noted, suspicious for underlying component of dementia,  along with uremia/hospital delirium contributing to current issues with mentation   Continue Synthroid  Continue SSI and monitor blood glucose  Continue famotidine  Continue PT/OT  Given that patient had a fall overnight after dialysis family is now requesting rehab placement as patient is having pain with ambulation.  CT of the abdomen pelvis pending.  X-rays so far have been negative for any fracture     Dispo: Family now would like rehab placement for short term      Discussed with RN.    VTE Prophylaxis:  Pharmacologic & mechanical VTE prophylaxis orders are present.        CODE STATUS:   Level Of Support Discussed With: Next of Kin (If No Surrogate)  Code Status (Patient has no pulse and is not breathing): No CPR (Do Not Attempt to Resuscitate)  Medical Interventions (Patient has pulse or is breathing): Full Support      Electronically signed by Harshal Higuera DO, 12/29/2024, 15:13 EST.

## 2024-12-29 NOTE — THERAPY TREATMENT NOTE
Acute Care - Physical Therapy Treatment Note  BRIA Sullivan     Patient Name: Nelson Wang  : 1945  MRN: [id number]  Today's Date: 2024      Visit Dx:     ICD-10-CM ICD-9-CM   1. Acute respiratory failure with hypoxia  J96.01 518.81   2. Acute pulmonary edema  J81.0 518.4   3. Difficulty walking  R26.2 719.7   4. Oropharyngeal dysphagia  R13.12 787.22     Patient Active Problem List   Diagnosis    Essential hypertension    Bradycardia, sinus    Anemia due to chronic kidney disease    Hypothyroidism    Idiopathic gout    Proteinuria    Psoriasis    Thrombocytopenia    Type 2 diabetes mellitus without complication    CKD (chronic kidney disease), stage IV    Hyperlipidemia    Monoclonal gammopathy of unknown significance (MGUS)    Stage 5 chronic kidney disease    Chronic gout without tophus    Peritoneal dialysis catheter dysfunction    Medicare annual wellness visit, subsequent    Chronic cough    Peritonitis associated with peritoneal dialysis    Recurrent pneumonia    Acute on chronic respiratory failure with hypoxia    ESRD (end stage renal disease)    Aspiration pneumonitis    Sepsis    Type 2 diabetes mellitus, with long-term current use of insulin    GERD without esophagitis    Immunosuppression due to drug therapy    Bipolar disorder    Chronic respiratory failure with hypoxia    Shortness of breath    Abnormal stress test    ESRD on dialysis    Abnormal chest CT    Encounter for screening for malignant neoplasm of colon    History of colon polyps    Family history of colon cancer    Intractable pain    Abdominal pain    ESRD (end stage renal disease) on dialysis    AMS (altered mental status)    Hallucinations    LUQ pain    Discitis    Metabolic encephalopathy    Aspiration pneumonia    Discitis of lumbar region    Severe malnutrition    Dementia    Unspecified severe protein-calorie malnutrition    Hypoxia    Gram-positive bacteremia     Past Medical History:   Diagnosis Date    Abnormal stress  test     Anemia     IRON INFUSIONS WITH DIALYSIS    Anesthesia     WITH HIP REPLACEMENT DAUGHTER BELIEVES HAD SVT 2000    Ankle pain, right     Anxiety and depression     Arthritis     CKD (chronic kidney disease), stage IV     DIALYSIS [id number] SHILEY IN RIGHT CHEST    Diabetes     GERD (gastroesophageal reflux disease)     Gout     High cholesterol     History of peritoneal dialysis     HL (hearing loss)     Hyperkalemia     Hypertension     Hypothyroidism     Insomnia     Night terrors     Psoriasis     Psoriasis     Sleep apnea     Sleep walking     Thrombocytopenia     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    Vitiligo      Past Surgical History:   Procedure Laterality Date    ABDOMINAL SURGERY      ANKLE SURGERY Right 11/1990    APPENDECTOMY N/A 1954    ARTERIOVENOUS FISTULA/SHUNT SURGERY Left 07/16/2024    Procedure: Creation of left arm arteriovenous fistula;  Surgeon: Moses Mir MD;  Location: [facility] MAIN OR;  Service: Vascular;  Laterality: Left;    BRONCHOSCOPY N/A 03/01/2024    Procedure: BRONCHOSCOPY WITH BAL AND WASHINGS;  Surgeon: Sandra Espinal MD;  Location: [facility] ENDOSCOPY;  Service: Pulmonary;  Laterality: N/A;  PNEUMONIA    BRONCHOSCOPY N/A 08/30/2024    Procedure: BRONCHOSCOPY WITH BALS AND WASHINGS;  Surgeon: Sandra Espinal MD;  Location: [facility] ENDOSCOPY;  Service: Pulmonary;  Laterality: N/A;  MUCOUS PLUGGING    CARDIAC CATHETERIZATION N/A 05/29/2024    Procedure: Left Heart Cath with possible coronary angioplasty;  Surgeon: Stephan Nichols MD;  Location: [facility] CATH INVASIVE LOCATION;  Service: Cardiology;  Laterality: N/A;    COLONOSCOPY N/A 06/2022    [facility]    ENDOSCOPY N/A 10/28/2024    Procedure: ESOPHAGOGASTRODUODENOSCOPY INSERTION OF LIGHTED INSTRUMENT TO VIEW ESOPHAGUS, STOMACH AND SMALL INTESTINE;  Surgeon: Kingsley Peraza MD;  Location: [facility] ENDOSCOPY;  Service: Gastroenterology;  Laterality: N/A;  Gastritis    FRACTURE SURGERY      HIP BIPOLAR  REPLACEMENT Right 01/2000    INSERTION HEMODIALYSIS CATHETER Left 04/09/2024    Procedure: HEMODIALYSIS CATHETER INSERTION;  Surgeon: Joes Berry MD;  Location: [facility] MAIN OR;  Service: General;  Laterality: Left;    INSERTION HEMODIALYSIS CATHETER N/A 04/12/2024    Procedure: Tunneled hemodialysis catheter insertion;  Surgeon: Enrique Vinson MD;  [facility];  Service: Vascular;  Laterality: N/A;    INSERTION PERITONEAL DIALYSIS CATHETER N/A 03/27/2023    Procedure: LAPAROSCOPIC INSERTION PERITONEAL DIALYSIS CATHETER, LAPAROSCOPIC OMENTOPEXY WITH LYSIS OF ADHESIONS;  Surgeon: Jose Berry MD;  [facility];  Service: General;  Laterality: N/A;    INSERTION PERITONEAL DIALYSIS CATHETER Left 07/23/2023    Procedure: REVISION OF PERITONEAL DIALYSIS CATHETER;  Surgeon: Radha Oreilly MD;  Location: [facility] MAIN OR;  Service: General;  Laterality: Left;    JOINT REPLACEMENT      REMOVAL PERITONEAL DIALYSIS CATHETER N/A 04/09/2024    Procedure: REMOVAL PERITONEAL DIALYSIS CATHETER;  Surgeon: Jose Berry MD;  Location: [facility] MAIN OR;  Service: General;  Laterality: N/A;    RENAL BIOPSY Left 07/15/2022    UPPER GASTROINTESTINAL ENDOSCOPY      VASCULAR SURGERY      WRIST SURGERY      UNSURE WHICH SIDE DAUGHTER REPORTS HAD SEVERE  CUT FROM WINDOW AND THEY HAD TO DO RECONSTRUCTIVE SURGERY WITH VESSELS AND NERVES     PT Assessment (Last 12 Hours)       PT Evaluation and Treatment       Row Name 12/29/24 0945 12/29/24 0858       Physical Therapy Time and Intention    Subjective Information complains of;weakness;fatigue;pain  -DK complains of;weakness;fatigue;pain  -DK    Document Type therapy note (daily note)  -DK therapy note (daily note)  -DK    Mode of Treatment individual therapy;physical therapy  -DK individual therapy;physical therapy  -DK    Patient Effort good  -DK good  -DK    Symptoms Noted During/After Treatment fatigue;increased pain  -DK  fatigue;increased pain  -DK    Comment Family requested a 2nd ambulation treatment. Quality of transfers and gait improved this session.  Pt appears in less pain. X-rays pending.  -DK Per daughter, pt did not sleep well overnight, has arthritis all over his body, had a fall on 12/28/24, and was c/o right hip pain.  Pt tolerated exercises, transfers and a short ambulation distance.  Pt may benefit from an x-ray to r/o fracture prior to discharge home today. Sitter in room. Daughter requested treatment prior to discharge today.  -DK      Row Name 12/29/24 0711          Physical Therapy Time and Intention    Session Not Performed --  Pt has orders for discharge home with home health today.  -DK       Row Name 12/29/24 0945 12/29/24 0858       Pain    Pretreatment Pain Rating 0/10 - no pain  -DK 0/10 - no pain  -DK    Posttreatment Pain Rating 2/10  -DK 3/10  -DK    Pain Location -- hip  -DK    Pain Side/Orientation right;generalized  -DK right;generalized  -DK    Pain Management Interventions exercise or physical activity utilized  -DK exercise or physical activity utilized  -DK    Response to Pain Interventions activity participation with increased pain  -DK activity participation with increased pain  -DK      Row Name 12/29/24 0945 12/29/24 0858       Cognition    Affect/Mental Status (Cognition) confused;low arousal/lethargic  -DK confused;low arousal/lethargic  -DK    Orientation Status (Cognition) oriented to;person  -DK oriented to;person  -DK    Follows Commands (Cognition) physical/tactile prompts required;repetition of directions required;verbal cues/prompting required  -DK physical/tactile prompts required;repetition of directions required;verbal cues/prompting required  -DK    Cognitive Function (Cognition) attention deficit  -DK attention deficit  -DK    Attention Deficit (Cognition) minimal deficit;moderate deficit;concentration;focused/sustained attention;arousal/alertness  -DK minimal deficit;moderate  deficit;concentration;focused/sustained attention;arousal/alertness  -DK    Personal Safety Interventions gait belt;nonskid shoes/slippers when out of bed;supervised activity  -DK gait belt;nonskid shoes/slippers when out of bed;supervised activity  -DK      Row Name 12/29/24 0945 12/29/24 0858       Bed Mobility    Bed Mobility scooting/bridging;supine-sit;sit-supine  -DK scooting/bridging;supine-sit;sit-supine  -DK    All Activities, [place] (Bed Mobility) 1 person assist;minimum assist (75% patient effort)  -DK moderate assist (50% patient effort);1 person assist;2 person assist  -DK    Rolling Left [place] (Bed Mobility) 1 person assist;minimum assist (75% patient effort)  -DK moderate assist (50% patient effort);1 person assist  -DK    Rolling Right [place] (Bed Mobility) 1 person assist;minimum assist (75% patient effort)  -DK moderate assist (50% patient effort);1 person assist  -DK    Scooting/Bridging [place] (Bed Mobility) 2 person assist;minimum assist (75% patient effort)  -DK moderate assist (50% patient effort);2 person assist  -DK    Supine-Sit [place] (Bed Mobility) 1 person assist;minimum assist (75% patient effort)  -DK moderate assist (50% patient effort);1 person assist  -DK    Sit-Supine [place] (Bed Mobility) 1 person assist;minimum assist (75% patient effort)  -DK moderate assist (50% patient effort);1 person assist;2 person assist  -DK    Bed Mobility, Safety Issues cognitive deficits limit understanding;decreased use of arms for pushing/pulling;decreased use of legs for bridging/pushing  -DK cognitive deficits limit understanding;decreased use of arms for pushing/pulling;decreased use of legs for bridging/pushing  -DK    Assistive Device (Bed Mobility) repositioning sheet  -DK repositioning sheet  -DK      Row Name 12/29/24 0945 12/29/24 0858       Transfers    Transfers sit-stand transfer;stand-sit transfer  -DK sit-stand transfer;stand-sit transfer  -DK       Row Name 12/29/24 0945 12/29/24 0858       Sit-Stand Transfer    Sit-Stand [place] (Transfers) minimum assist (75% patient effort);1 person assist  -DK minimum assist (75% patient effort);moderate assist (50% patient effort);1 person assist  -DK    Assistive Device (Sit-Stand Transfers) walker, front-wheeled  -DK walker, front-wheeled  -DK      Row Name 12/29/24 0945 12/29/24 0858       Stand-Sit Transfer    Stand-Sit [place] (Transfers) minimum assist (75% patient effort);1 person assist  -DK minimum assist (75% patient effort);moderate assist (50% patient effort);1 person assist  -DK    Assistive Device (Stand-Sit Transfers) walker, front-wheeled  -DK walker, front-wheeled  -DK      Row Name 12/29/24 0945 12/29/24 0858       Gait/Stairs (Locomotion)    Gait/Stairs Locomotion gait/ambulation independence;gait/ambulation assistive device;distance ambulated;gait pattern  -DK gait/ambulation independence;gait/ambulation assistive device;distance ambulated;gait pattern  -DK    [place] Level (Gait) 1 person assist;contact guard;minimum assist (75% patient effort)  -DK minimum assist (75% patient effort);1 person assist  -DK    Assistive Device (Gait) walker, front-wheeled  -DK walker, front-wheeled  -DK    Distance in Feet (Gait) 20  -DK 20  -DK    Pattern (Gait) step-to  -DK step-to  -DK    Deviations/Abnormal Patterns (Gait) marguerite decreased;festinating/shuffling;gait speed decreased;stride length decreased  -DK marguerite decreased;festinating/shuffling;gait speed decreased;stride length decreased  -DK    Bilateral Gait Deviations forward flexed posture  -DK forward flexed posture  -DK    Comment, (Gait/Stairs) Pt ambulated on room air with a rolling walker.  Quality of gait improved.  Pt was returned to the bedside via a wheelchair.  Pt returned to bed on alert post treatment.  -DK Pt ambulated on room air with a rolling walker.  He exhbited a bent posture. Pt was able to support his weight with the  right leg, but was limping.  Pt's knees began buckling somewhat at 20' and he was placed in the wheelchair and  returned to bedside.  Pt was able to stand and ambulate a few steps with assist x 2,wheelchair to bed.  He returned to bed on alert post treatment.  -DK      Row Name 12/29/24 0945 12/29/24 0858       Safety Issues/Impairments Affecting Functional Mobility    Safety Issues Affecting Function (Mobility) -- ability to follow commands;awareness of need for assistance;impulsivity;judgment;safety precaution awareness  -DK    Impairments Affecting Function (Mobility) balance;cognition;endurance/activity tolerance;pain;strength  -DK balance;cognition;endurance/activity tolerance;pain;strength  -DK    Cognitive Impairments, Mobility Safety/Performance -- attention;judgment;impulsivity;awareness, need for assistance;safety precaution awareness  -DK      Row Name 12/29/24 0945 12/29/24 0858       Balance    Balance Assessment sitting static balance;sitting dynamic balance;standing dynamic balance;standing static balance  -DK sitting static balance;sitting dynamic balance;standing static balance;standing dynamic balance  -DK    Static Sitting Balance standby assist;contact guard;1-person assist  -DK contact guard;1-person assist  -DK    Dynamic Sitting Balance standby assist;contact guard;1-person assist  -DK contact guard;1-person assist  -DK    Position, Sitting Balance supported;sitting edge of bed;sitting in chair  -DK supported;sitting in chair;sitting edge of bed  -DK    Static Standing Balance contact guard;1-person assist  -DK minimal assist;1-person assist  -DK    Dynamic Standing Balance contact guard;minimal assist;1-person assist  -DK minimal assist;1-person assist  -DK    Position/Device Used, Standing Balance walker, front-wheeled  -DK walker, front-wheeled  -DK    Balance Interventions standing;dynamic;supported;static;tandem gait;tandem standing  -DK standing;dynamic;supported;tandem gait;tandem  standing;static  -      Row Name 12/29/24 0858          Motor Skills    Motor Skills --  therapeutic exercises  -     Therapeutic Exercise hip;knee;ankle  -     Additional Documentation --  Pt did exhibit minor discomfort initially with movement of the right hip during the first 2-3 repetitions, then did not appear to have discomfort for the remainder of the repetitions.  -       Row Name 12/29/24 0858          Hip (Therapeutic Exercise)    Hip (Therapeutic Exercise) AAROM (active assistive range of motion)  -     Hip AAROM (Therapeutic Exercise) bilateral;flexion;extension;aBduction;aDduction;supine;10 repetitions;2 sets  -       Row Name 12/29/24 0858          Knee (Therapeutic Exercise)    Knee (Therapeutic Exercise) AAROM (active assistive range of motion)  -     Knee AAROM (Therapeutic Exercise) bilateral;flexion;extension;supine;10 repetitions;2 sets  -       Row Name 12/29/24 0858          Ankle (Therapeutic Exercise)    Ankle (Therapeutic Exercise) AAROM (active assistive range of motion)  -     Ankle AAROM (Therapeutic Exercise) bilateral;dorsiflexion;plantarflexion;supine;10 repetitions;2 sets  -       Row Name             Wound 11/15/24 1945 Right upper chest    Wound - Properties Group Placement Date: 11/15/24  -JR Placement Time: 1945  -JR Side: Right  -JR Orientation: upper  -JR Location: chest  -JR Primary Wound Type: Incision  -JR    Retired Wound - Properties Group Placement Date: 11/15/24  -JR Placement Time: 1945  -JR Side: Right  -JR Orientation: upper  -JR Location: chest  -JR Primary Wound Type: Incision  -JR    Retired Wound - Properties Group Placement Date: 11/15/24  -JR Placement Time: 1945  -JR Side: Right  -JR Orientation: upper  -JR Location: chest  -JR Primary Wound Type: Incision  -JR    Retired Wound - Properties Group Date first assessed: 11/15/24  -JR Time first assessed: 1945  -JR Side: Right  -JR Location: chest  -JR Primary Wound Type: Incision  -JR      Row  Name             Wound 12/05/24 2331 Right lower leg abrasion    Wound - Properties Group Placement Date: 12/05/24  -AW Placement Time: 2331  -AW Side: Right  -AW Orientation: lower  -AW Location: leg  -AW Primary Wound Type: Abrasion  -AW Type: abrasion  -AW Present on Original Admission: Y  -AW    Retired Wound - Properties Group Placement Date: 12/05/24  -AW Placement Time: 2331  -AW Present on Original Admission: Y  -AW Side: Right  -AW Orientation: lower  -AW Location: leg  -AW Primary Wound Type: Abrasion  -AW Type: abrasion  -AW    Retired Wound - Properties Group Placement Date: 12/05/24  -AW Placement Time: 2331  -AW Present on Original Admission: Y  -AW Side: Right  -AW Orientation: lower  -AW Location: leg  -AW Primary Wound Type: Abrasion  -AW Type: abrasion  -AW    Retired Wound - Properties Group Date first assessed: 12/05/24  -AW Time first assessed: 2331  -AW Present on Original Admission: Y  -AW Side: Right  -AW Location: leg  -AW Primary Wound Type: Abrasion  -AW Type: abrasion  -AW      Row Name             Wound 12/06/24 1440 Left lower leg skin tear    Wound - Properties Group Placement Date: 12/06/24  -KE Placement Time: 1440  -KE Side: Left  -KE Orientation: lower  -KE Location: leg  -KE Primary Wound Type: Skin tear  -KE Type: skin tear  -KE    Retired Wound - Properties Group Placement Date: 12/06/24  -KE Placement Time: 1440  -KE Side: Left  -KE Orientation: lower  -KE Location: leg  -KE Primary Wound Type: Skin tear  -KE Type: skin tear  -KE    Retired Wound - Properties Group Placement Date: 12/06/24  -KE Placement Time: 1440  -KE Side: Left  -KE Orientation: lower  -KE Location: leg  -KE Primary Wound Type: Skin tear  -KE Type: skin tear  -KE    Retired Wound - Properties Group Date first assessed: 12/06/24  -KE Time first assessed: 1440  -KE Side: Left  -KE Location: leg  -KE Primary Wound Type: Skin tear  -KE Type: skin tear  -KE      Row Name             Wound 12/28/24 2100 Right  posterior elbow skin tear    Wound - Properties Group Placement Date: 12/28/24 -KM Placement Time: 2100 -KM Side: Right  -KM Orientation: posterior  -KM Location: elbow  -KM Primary Wound Type: Skin tear  -KM Type: skin tear  -KM    Retired Wound - Properties Group Placement Date: 12/28/24 -KM Placement Time: 2100  -KM Side: Right  -KM Orientation: posterior  -KM Location: elbow  -KM Primary Wound Type: Skin tear  -KM Type: skin tear  -KM    Retired Wound - Properties Group Placement Date: 12/28/24  -KM Placement Time: 2100  -KM Side: Right  -KM Orientation: posterior  -KM Location: elbow  -KM Primary Wound Type: Skin tear  -KM Type: skin tear  -KM    Retired Wound - Properties Group Date first assessed: 12/28/24 -KM Time first assessed: 2100 -KM Side: Right  -KM Location: elbow  -KM Primary Wound Type: Skin tear  -KM Type: skin tear  -KM      Row Name 12/29/24 0945 12/29/24 0858       Plan of Care Review    Plan of Care Reviewed With patient;daughter  -DK patient;daughter  -DK    Progress improving  -DK improving  -DK      Row Name 12/29/24 0945 12/29/24 0858       Positioning and Restraints    Pre-Treatment Position in bed  -DK in bed  -DK    Post Treatment Position bed  -DK bed  -DK    In Bed supine;call light within reach;encouraged to call for assist;exit alarm on;patient within staff view;with family/caregiver;side rails up x3;legs elevated;heels elevated  -DK supine;call light within reach;encouraged to call for assist;exit alarm on;with family/caregiver;patient within staff view;side rails up x3;with other staff;legs elevated  -DK      Row Name 12/29/24 0945 12/29/24 0858       Therapy Assessment/Plan (PT)    Rehab Potential (PT) good  -DK good  -DK    Criteria for Skilled Interventions Met (PT) skilled treatment is necessary  -DK skilled treatment is necessary  -DK    Therapy Frequency (PT) daily  -DK daily  -DK    Problem List (PT) problems related to;balance;cognition;mobility;range of motion  (ROM);strength;pain;hearing;communication  -DK problems related to;balance;cognition;mobility;range of motion (ROM);strength;pain;hearing;communication  -DK    Activity Limitations Related to Problem List (PT) unable to ambulate safely;unable to transfer safely  -DK unable to ambulate safely;unable to transfer safely  -DK      Row Name 12/29/24 0945 12/29/24 0858       Progress Summary (PT)    Progress Toward Functional Goals (PT) progress toward functional goals is good  -DK progress toward functional goals is good  -DK              User Key  (r) = Recorded By, (t) = Taken By, (c) = Cosigned By      Initials Name Provider Type    JR Stan Henderson, RN Registered Nurse    Jackelin Freitas, RN Registered Nurse    Neena Weaver, RN Registered Nurse    Farzana Ma, RN Registered Nurse    Lia Canales, GREER Physical Therapist Assistant                    Physical Therapy Education       Title: PT OT SLP Therapies (In Progress)       Topic: Physical Therapy (In Progress)       Point: Mobility training (Done)       Learning Progress Summary            Patient Acceptance, E,TB, VU by AV at 12/17/2024 1454                      Point: Home exercise program (Not Started)       Learner Progress:  Not documented in this visit.              Point: Body mechanics (Done)       Learning Progress Summary            Patient Acceptance, E,TB, VU by AV at 12/17/2024 1454                      Point: Precautions (Done)       Learning Progress Summary            Patient Acceptance, E,TB, VU by AV at 12/17/2024 1454                                      User Key       Initials Effective Dates Name Provider Type Discipline     06/11/21 -  Kaushik Chavez, PT Physical Therapist PT                  PT Recommendation and Plan  Planned Therapy Interventions (PT): balance training, bed mobility training, gait training, strengthening, transfer training  Therapy Frequency (PT): daily  Progress Summary (PT)  Progress Toward Functional  Goals (PT): progress toward functional goals is good  Plan of Care Reviewed With: patient, daughter  Progress: improving   Outcome Measures       Row Name 12/29/24 0858             How much help from another person do you currently need...    Turning from your back to your side while in flat bed without using bedrails? 3  -DK      Moving from lying on back to sitting on the side of a flat bed without bedrails? 3  -DK      Moving to and from a bed to a chair (including a wheelchair)? 2  -DK      Standing up from a chair using your arms (e.g., wheelchair, bedside chair)? 2  -DK      Climbing 3-5 steps with a railing? 2  -DK      To walk in hospital room? 2  -DK      AM-PAC 6 Clicks Score (PT) 14  -DK         Functional Assessment    Outcome Measure Options AM-PAC 6 Clicks Basic Mobility (PT)  -DK                User Key  (r) = Recorded By, (t) = Taken By, (c) = Cosigned By      Initials Name Provider Type    Lia Canales PTA Physical Therapist Assistant                     Time Calculation:    PT Charges       Row Name 12/29/24 0949 12/29/24 0909 12/29/24 0712       Time Calculation    PT Received On 12/29/24  -DK 12/29/24  -DK 12/29/24  -DK    PT Goal Re-Cert Due Date 01/05/25  -DK 01/05/25  -DK 01/05/25  -DK       Timed Charges    27269 - PT Therapeutic Exercise Minutes -- 14  -DK --    18117 - Gait Training Minutes  8  -DK 8  -DK --    59584 - PT Therapeutic Activity Minutes 15  -DK 18  -DK --       Total Minutes    Timed Charges Total Minutes 23  -DK 40  -DK --     Total Minutes 23  -DK 40  -DK --              User Key  (r) = Recorded By, (t) = Taken By, (c) = Cosigned By      Initials Name Provider Type    Lia Canales PTA Physical Therapist Assistant                  Therapy Charges for Today       Code Description Service Date Service Provider Modifiers Qty    52692357264 HC PT THER PROC EA 15 MIN 12/29/2024 Lia Escamilla PTA GP 1    19851706543 HC GAIT TRAINING EA 15 MIN 12/29/2024 Francine  Lia, PTA GP 1    60015105243 HC PT THERAPEUTIC ACT EA 15 MIN 12/29/2024 iLa Escamilla, PTA GP 1    40181129389 HC GAIT TRAINING EA 15 MIN 12/29/2024 Lia Escamilla, PTA GP 1    24452096034 HC PT THERAPEUTIC ACT EA 15 MIN 12/29/2024 Lia Escamilla, PTA GP 1            PT G-Codes  Outcome Measure Options: AM-PAC 6 Clicks Basic Mobility (PT)  AM-PAC 6 Clicks Score (PT): 14  AM-PAC 6 Clicks Score (OT): 7    Lia Escamilla PTA  12/29/2024

## 2024-12-30 ENCOUNTER — APPOINTMENT (OUTPATIENT)
Dept: CT IMAGING | Facility: HOSPITAL | Age: 79
End: 2024-12-30
Payer: MEDICARE

## 2024-12-30 ENCOUNTER — APPOINTMENT (OUTPATIENT)
Dept: GENERAL RADIOLOGY | Facility: HOSPITAL | Age: 79
End: 2024-12-30
Payer: MEDICARE

## 2024-12-30 LAB
ALBUMIN SERPL-MCNC: 3.1 G/DL (ref 3.5–5.2)
ANION GAP SERPL CALCULATED.3IONS-SCNC: 15 MMOL/L (ref 5–15)
BUN SERPL-MCNC: 25 MG/DL (ref 8–23)
BUN/CREAT SERPL: 4.7 (ref 7–25)
CALCIUM SPEC-SCNC: 9.9 MG/DL (ref 8.6–10.5)
CHLORIDE SERPL-SCNC: 94 MMOL/L (ref 98–107)
CO2 SERPL-SCNC: 23 MMOL/L (ref 22–29)
CREAT SERPL-MCNC: 5.37 MG/DL (ref 0.76–1.27)
EGFRCR SERPLBLD CKD-EPI 2021: 10.3 ML/MIN/1.73
GLUCOSE BLDC GLUCOMTR-MCNC: 100 MG/DL (ref 70–99)
GLUCOSE BLDC GLUCOMTR-MCNC: 119 MG/DL (ref 70–99)
GLUCOSE SERPL-MCNC: 120 MG/DL (ref 65–99)
PHOSPHATE SERPL-MCNC: 4.5 MG/DL (ref 2.5–4.5)
POTASSIUM SERPL-SCNC: 4.5 MMOL/L (ref 3.5–5.2)
SODIUM SERPL-SCNC: 132 MMOL/L (ref 136–145)
WHOLE BLOOD HOLD SPECIMEN: NORMAL

## 2024-12-30 PROCEDURE — 73600 X-RAY EXAM OF ANKLE: CPT

## 2024-12-30 PROCEDURE — 70450 CT HEAD/BRAIN W/O DYE: CPT

## 2024-12-30 PROCEDURE — 94799 UNLISTED PULMONARY SVC/PX: CPT

## 2024-12-30 PROCEDURE — 80069 RENAL FUNCTION PANEL: CPT | Performed by: INTERNAL MEDICINE

## 2024-12-30 PROCEDURE — 99232 SBSQ HOSP IP/OBS MODERATE 35: CPT | Performed by: INTERNAL MEDICINE

## 2024-12-30 PROCEDURE — 94664 DEMO&/EVAL PT USE INHALER: CPT

## 2024-12-30 PROCEDURE — 82948 REAGENT STRIP/BLOOD GLUCOSE: CPT

## 2024-12-30 RX ORDER — OXYCODONE AND ACETAMINOPHEN 5; 325 MG/1; MG/1
0.5 TABLET ORAL ONCE AS NEEDED
Status: COMPLETED | OUTPATIENT
Start: 2024-12-30 | End: 2024-12-30

## 2024-12-30 RX ORDER — HYDROCORTISONE ACETATE, PRAMOXINE HCL 2.5; 1 G/100G; G/100G
1 CREAM TOPICAL 3 TIMES DAILY PRN
Status: DISCONTINUED | OUTPATIENT
Start: 2024-12-30 | End: 2025-01-08 | Stop reason: HOSPADM

## 2024-12-30 RX ORDER — MIDODRINE HYDROCHLORIDE 10 MG/1
10 TABLET ORAL
Status: DISCONTINUED | OUTPATIENT
Start: 2024-12-31 | End: 2025-01-08 | Stop reason: HOSPADM

## 2024-12-30 RX ORDER — IODINE/SODIUM IODIDE 2 %
TINCTURE TOPICAL EVERY 8 HOURS PRN
Status: DISCONTINUED | OUTPATIENT
Start: 2024-12-30 | End: 2025-01-08 | Stop reason: HOSPADM

## 2024-12-30 RX ADMIN — HYDROCORTISONE ACETATE, PRAMOXINE HCL 1 APPLICATION: 2.5; 1 CREAM TOPICAL at 21:45

## 2024-12-30 RX ADMIN — OXYCODONE AND ACETAMINOPHEN 0.5 TABLET: 5; 325 TABLET ORAL at 04:23

## 2024-12-30 RX ADMIN — DICLOFENAC SODIUM 2 G: 10 GEL TOPICAL at 03:54

## 2024-12-30 RX ADMIN — ARFORMOTEROL TARTRATE 15 MCG: 15 SOLUTION RESPIRATORY (INHALATION) at 20:41

## 2024-12-30 RX ADMIN — ACETAMINOPHEN 650 MG: 325 TABLET ORAL at 14:49

## 2024-12-30 RX ADMIN — BUDESONIDE 0.5 MG: 0.5 INHALANT ORAL at 10:47

## 2024-12-30 RX ADMIN — DICLOFENAC SODIUM 2 G: 10 GEL TOPICAL at 16:23

## 2024-12-30 RX ADMIN — LEVOTHYROXINE SODIUM 175 MCG: 0.03 TABLET ORAL at 06:48

## 2024-12-30 RX ADMIN — ARFORMOTEROL TARTRATE 15 MCG: 15 SOLUTION RESPIRATORY (INHALATION) at 10:47

## 2024-12-30 RX ADMIN — QUETIAPINE FUMARATE 25 MG: 25 TABLET ORAL at 20:53

## 2024-12-30 RX ADMIN — DICLOFENAC SODIUM 2 G: 10 GEL TOPICAL at 10:18

## 2024-12-30 RX ADMIN — BUDESONIDE 0.5 MG: 0.5 INHALANT ORAL at 20:41

## 2024-12-30 RX ADMIN — FAMOTIDINE 10 MG: 20 TABLET, FILM COATED ORAL at 10:18

## 2024-12-30 RX ADMIN — ACETAMINOPHEN 650 MG: 325 TABLET ORAL at 03:54

## 2024-12-30 RX ADMIN — OXYCODONE HYDROCHLORIDE AND ACETAMINOPHEN 0.5 TABLET: 5; 325 TABLET ORAL at 20:53

## 2024-12-30 RX ADMIN — OXYCODONE AND ACETAMINOPHEN 0.5 TABLET: 5; 325 TABLET ORAL at 10:18

## 2024-12-30 RX ADMIN — WHITE PETROLATUM 1 APPLICATION: 1.75 OINTMENT TOPICAL at 16:23

## 2024-12-30 RX ADMIN — Medication 2.5 MG: at 20:53

## 2024-12-30 RX ADMIN — FERRIC OXIDE RED: 8; 8 LOTION TOPICAL at 21:45

## 2024-12-30 NOTE — PROGRESS NOTES
[facility]   Hospitalist Progress Note  Date: 2024  Patient Name: Nelson Wang  : 1945  MRN: [id number]  Date of admission: 2024  Room/Bed: [facility]      Subjective   Subjective     Chief Complaint: Shortness of breath    Summary:Nelson Wang is a 79 y.o. male ESRD on dialysis, type 2 diabetes, dementia, discitis on vancomycin, A-fib, restrictive lung disease who presents to the emergency department for evaluation of hypoxia and shortness of breath. Patient was started on supplemental oxygen to maintain his oxygen saturations. He was given a dose of Bumex overnight. His chest x-ray was consistent with bilateral pleural effusions. Pulmonology was consulted for the bilateral pleural effusions and and nephrology consulted for possible volume overload. Pulmonology was performing a thoracentesis this morning. Thoracentesis was showing bloody fluid. Patient suffered CODE BLUE. Likely bleeding into his airways causing hypoxic arrest. He received CPR for 6 minutes and achieved ROSC. He was transferred to the ICU, intubated and on blood pressure support. Bronchoscopy was showing blood clots in his airways. Patient was started on CRRT. Patient's medical status is tenuous. He is currently on 1 pressor. CRRT is removing fluid at a slow rate. Patient started on micafungin on 2024. Patient extubated on 2024. Patient on high flow nasal cannula 30 L 25%. Slowly weaning down on supplemental oxygen, breathing more comfortably. Poor mental status still waxing and waning suspect underlying dementia with history of alcohol abuse, mixed with hospital delirium and hyperactive delirium.  Patient continues on hemodialysis with midodrine on dialysis days has required extra Seroquel and occasional as needed medications for sedation to allow him to tolerate hemodialysis.  Working on mentation and trying to get back home ultimately.     Patient has completed IV Vanco and micafungin course while in the  "hospital.    Interval Followup:    Patient was set to be discharged home on December 28 after dialysis.  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  After dialysis family requested that patient stay a couple hours to \"sleep off \"the Ativan.  During that time patient had a fall.  Per nursing note it stated that the thump was hard and patient was found down on the floor.  Overnight physician was contacted as well as patient's daughter Dr. Wang.  Patient had head CT as well as cervical spine CT and right elbow x-ray which were unremarkable.  However this morning patient is stating a lot of pain with ambulation and pain in his legs.  Patient had right knee and femur x-ray which were negative for any fracture.   CT of the pelvis also appears fine.   At this time family is now requesting patient not discharged home and be evaluated for rehab placement.    Today on patient complains of pain in his legs with ambulation. More so on the right leg.   Family would like xray of right ankle today  Currently now trying to find rehab placement   Vitals remain stable     Review of Systems    All systems reviewed and negative except for what is outlined above.      Objective   Objective     Vitals:   Temp:  [97.7 °F (36.5 °C)-98.5 °F (36.9 °C)] 98.5 °F (36.9 °C)  Heart Rate:  [] 80  Resp:  [16-20] 18  BP: (127-137)/(45-88) 127/65    Physical Exam   Gen: sleeping. Family at the bedside   Cards: RRR  Pulm: No respiratory distress  Abd: soft, nondistended  Extremities: no pitting edema  Msk: pain with ambulation. Patient in hips bilaterally. Pain in knees     Result Review    Result Review:  I have personally reviewed these results:  [x]  Laboratory      Lab 12/28/24  0921 12/27/24  0535   WBC 5.60 5.59   HEMOGLOBIN 9.1* 9.3*   HEMATOCRIT 30.5* 31.4*   PLATELETS 230 244   MCV 90.8 90.2         Lab 12/30/24  0538 " 12/28/24  0921 12/27/24  0535   SODIUM 132* 134* 133*   POTASSIUM 4.5 4.3 4.2   CHLORIDE 94* 93* 94*   CO2 23.0 22.3 25.1   ANION GAP 15.0 18.7* 13.9   BUN 25* 32* 22   CREATININE 5.37* 6.16* 4.35*   EGFR 10.3* 8.7* 13.2*   GLUCOSE 120* 174* 107*   CALCIUM 9.9 10.1 10.2   MAGNESIUM  --  2.1 2.0   PHOSPHORUS 4.5 5.8*  --          Lab 12/30/24  0538 12/28/24  0921   ALBUMIN 3.1* 3.1*                       Brief Urine Lab Results  (Last result in the past 365 days)        Color   Clarity   Blood   Leuk Est   Nitrite   Protein   CREAT   Urine HCG        10/27/24 1417 Yellow   Cloudy   Negative   Trace   Negative   >=300 mg/dL (3+)                 [x]  Microbiology   Microbiology Results (last 10 days)       Procedure Component Value - Date/Time    Blood Culture - Blood, Hand, Right [[id number]]  (Normal) Collected: 12/23/24 0453    Lab Status: Final result Specimen: Blood from Hand, Right Updated: 12/28/24 0530     Blood Culture No growth at 5 days    Narrative:      Less than seven (7) mL's of blood was collected.  Insufficient quantity may yield false negative results.    Blood Culture - Blood, Arm, Right [[id number]]  (Normal) Collected: 12/23/24 0453    Lab Status: Final result Specimen: Blood from Arm, Right Updated: 12/28/24 0530     Blood Culture No growth at 5 days    Narrative:      Less than seven (7) mL's of blood was collected.  Insufficient quantity may yield false negative results.          [x]  Radiology  CT Abdomen Pelvis Without Contrast    Result Date: 12/29/2024  Impression: CT scan of the abdomen and pelvis without contrast demonstrating small right effusion, slightly larger. Consolidation and/or atelectasis in the right lower lobe appears slightly worsened. 1.1 cm nonobstructing stone in the right renal pelvis is unchanged. The kidneys have an atrophic appearance. Pancolonic diverticulosis without diverticulitis. Chronic compression fractures of T12 and L3 appear stable. Electronically Signed: Clifford  MD Jesica  12/29/2024 6:13 PM EST  Workstation ID: [id number]    XR Knee 1 or 2 View Right    Result Date: 12/29/2024  Impression: Negative for displaced fracture or joint malalignment involving right femur or knee. Radiographically uncomplicated hip prosthesis. Electronically Signed: Lorne Bustamante MD  12/29/2024 11:11 AM EST  Workstation ID: [id number]    XR Femur 2 View Right    Result Date: 12/29/2024  Impression: Negative for displaced fracture or joint malalignment involving right femur or knee. Radiographically uncomplicated hip prosthesis. Electronically Signed: Lorne Bustamante MD  12/29/2024 11:11 AM EST  Workstation ID: [id number]    XR Elbow 2 View Right    Result Date: 12/28/2024  No acute fracture or acute malalignment is identified.   Portions of this note were completed with a voice recognition program.  Electronically Signed By-Stan Hogan MD On:12/28/2024 11:33 PM     []  EKG/Telemetry   []  Cardiology/Vascular   []  Pathology  []  Old records  []  Other:    Assessment & Plan   Assessment / Plan     Assessment/Plan (clinically significant if listed here)  Cardiac arrest secondary to hypoxemia in setting of thoracentesis  Acute hypoxic/hypercapnic respiratory failure requiring mechanical ventilation  Acute metabolic encephalopathy/altered mental status, initial concern for seizures, EEG negative x 2  Acute on chronic diastolic heart failure  Acute cardiogenic pulmonary edema  Bilateral pleural effusions  Hemoptysis  Candidemia s/p iv micafungin course completed 12/22/24  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  Blood pressure  is doing okay on nondialysis days   Off oxygen currently, better after had further volume remove.  Continue to monitor closely for further aspiration, continue with aspiration cautions and adjusted diet per speech.    discussed with ID previously, has completed IV vancomycin course for staph epi discitis, apparently Dr. Sigala (ID in [place]) saw him and 6 week course would be completed 12/14.  iv micafungin 14d course completed 12/22  EEG from prior hospitalization 11/15 and 12/10 without seizure activity noted on limited study.  Keppra has been dc'd given concern of contribution to mentation.  Will continue to monitor without this.    12/1 ct head had a small hematoma, no acute abnormality, has generalized parenchymal volume loss noted, suspicious for underlying component of dementia,  along with uremia/hospital delirium contributing to current issues with mentation   Continue Synthroid  Continue SSI and monitor blood glucose  Continue famotidine  Continue PT/OT  Given that patient had a fall  after dialysis on 12-28  family is now requesting rehab placement as patient is having pain with ambulation.  CT of the abdomen pelvis fine. .  X-rays so far have been negative for any fracture  Family has requested x-ray of right ankle today.  This has been ordered     Dispo: Family now would like rehab placement for short term      Discussed with RN.    VTE Prophylaxis:  Pharmacologic & mechanical VTE prophylaxis orders are present.        CODE STATUS:   Level Of Support Discussed With: Next of Kin (If No Surrogate)  Code Status (Patient has no pulse and is not breathing): No CPR (Do Not Attempt to Resuscitate)  Medical Interventions (Patient has pulse or is breathing): Full Support      Electronically signed by Harshal Higuera DO, 12/30/2024, 12:41 EST.

## 2024-12-30 NOTE — PLAN OF CARE
Goal Outcome Evaluation:              Outcome Evaluation: remains aoxself, intermittently following commands. xray of right ankle this shift. continues to c/o pain generalized, mainly in right leg. medicated per orders. frequently repositions self independently. unable to ambulate this shift, sitting on edge of bed for meals. fall risk measures in place and  at bedside due to restlessness and impulsiveness. BG monitored. skin and wound care provided as ordered. awaiting rehab placement.

## 2024-12-30 NOTE — PLAN OF CARE
Goal Outcome Evaluation:  Plan of Care Reviewed With: patient        Progress: no change  Outcome Evaluation: pt is alert and oriented to self. Daughter remain at bedside. Rested in bed and refused to do ambulation. Medicated per MAR.  at bedside. Skin care performed this shift. Medicated pain med once. No other concerns at this time. Continue plan of care

## 2024-12-30 NOTE — SIGNIFICANT NOTE
12/30/24 1459   OTHER   Discipline speech language pathologist   Rehab Time/Intention   Session Not Performed other (see comments)   Recommendations   SLP - Next Appointment 01/02/25     Pt had just fallen asleep; SLP intended to make second attempt but there was no time later in the day due to other consults.

## 2024-12-30 NOTE — SIGNIFICANT NOTE
12/30/24 1138   Physical Therapy Time and Intention   Session Not Performed patient/family declined, not feeling well;patient unavailable for treatment   Comment, Session Not Performed 2 attempts to see pt this date without success.  Family declined x 2-pt in pn, pt just fell asleep.

## 2024-12-30 NOTE — PROGRESS NOTES
[facility]     Nephrology Progress Note      Patient Name: Nelson Wang  : 1945  MRN: [id number]  Primary Care Physician:  Domingo Bravo MD  Date of admission: 2024    Subjective   Subjective     Interval History:  Still confused this morning No distress but seems restless.  Tolerating p.o.  Called earlier, plans for rehab now.    Objective   Objective     Vitals:   Temp:  [97.7 °F (36.5 °C)-98.5 °F (36.9 °C)] 98.5 °F (36.9 °C)  Heart Rate:  [] 80  Resp:  [16-20] 18  BP: (127-137)/(45-88) 127/65  Physical Exam:   constitutional: Awake, alert, confused, hard of hearing, mildly restless.    Eyes: sclerae anicteric, no conjunctival injection   HENT: mucous membranes moist.     Neck: Supple, no thyromegaly, no lymphadenopathy, trachea midline, No JVD   Respiratory: Decreased to auscultation bilaterally, nonlabored respirations, some upper airway rhonchi.   Cardiovascular: RRR, no murmurs, rubs, or gallops.   Gastrointestinal: Positive bowel sounds, soft,  nondistended   Musculoskeletal: No edema, no clubbing or cyanosis.  Left upper extremity AV fistula, patent.   Neurologic: moving extremities, answering questions but inconsistently.  Incoherent at times.   Skin: warm and dry, no rashes, areas of hypopigmentation.    Result Review    Result Reviewed:  I have personally reviewed the results from the time of this admission to 2024 14:35 EST and agree with these findings:  [x]  Laboratory  []  Microbiology  [x]  Radiology  []  EKG/Telemetry   []  Cardiology/Vascular   []  Pathology  [x]  Old records  []  Other:        Lab 24  0538 24  0921 24  0535 24  0810   SODIUM 132* 134* 133* 135*   POTASSIUM 4.5 4.3 4.2 4.5   CHLORIDE 94* 93* 94* 96*   CO2 23.0 22.3 25.1 26.4   BUN 25* 32* 22 41*   CREATININE 5.37* 6.16* 4.35* 5.33*   GLUCOSE 120* 174* 107* 98   EGFR 10.3* 8.7* 13.2* 10.3*   ANION GAP 15.0 18.7* 13.9 12.6   MAGNESIUM  --  2.1 2.0  --    PHOSPHORUS 4.5 5.8*   --   --              Assessment & Plan   Assessment / Plan       Active Hospital Problems:  Active Hospital Problems    Diagnosis     **Hypoxia     Gram-positive bacteremia     ESRD (end stage renal disease)     Essential hypertension     Type 2 diabetes mellitus without complication        Assessment and Plan:    - ESRD, was on home dialysis PTA, left upper extremity AV fistula for access.  Electrolytes are stable.  Will plan dialysis in AM.     - Volume overload, much better now.   Monitor closely.      - Aspiration pneumonia, and possibly recurrent aspiration, per pulmonary.      - Type 2 diabetes with complications.     - Hypotension blood pressure is acceptable now, on ProAmatine and blood pressure stable.     - Anemia of chronic kidney disease, on RONALD as outpatient.  Getting Epogen while here.     - Altered mentation.  Multifactorial including possible dementia?,  May be slightly better today, Per primary.    - Status post fall last night, no apparent injury, x-rays reviewed.  X-rays of lower extremity planned today.

## 2024-12-31 LAB
ANION GAP SERPL CALCULATED.3IONS-SCNC: 14.1 MMOL/L (ref 5–15)
BUN SERPL-MCNC: 38 MG/DL (ref 8–23)
BUN/CREAT SERPL: 5.4 (ref 7–25)
CALCIUM SPEC-SCNC: 9.9 MG/DL (ref 8.6–10.5)
CHLORIDE SERPL-SCNC: 97 MMOL/L (ref 98–107)
CO2 SERPL-SCNC: 22.9 MMOL/L (ref 22–29)
CREAT SERPL-MCNC: 7.05 MG/DL (ref 0.76–1.27)
EGFRCR SERPLBLD CKD-EPI 2021: 7.4 ML/MIN/1.73
GLUCOSE BLDC GLUCOMTR-MCNC: 118 MG/DL (ref 70–99)
GLUCOSE BLDC GLUCOMTR-MCNC: 157 MG/DL (ref 70–99)
GLUCOSE SERPL-MCNC: 101 MG/DL (ref 65–99)
POTASSIUM SERPL-SCNC: 5.3 MMOL/L (ref 3.5–5.2)
SODIUM SERPL-SCNC: 134 MMOL/L (ref 136–145)
WHOLE BLOOD HOLD SPECIMEN: NORMAL

## 2024-12-31 PROCEDURE — 80048 BASIC METABOLIC PNL TOTAL CA: CPT | Performed by: INTERNAL MEDICINE

## 2024-12-31 PROCEDURE — 94799 UNLISTED PULMONARY SVC/PX: CPT

## 2024-12-31 PROCEDURE — 99232 SBSQ HOSP IP/OBS MODERATE 35: CPT | Performed by: INTERNAL MEDICINE

## 2024-12-31 PROCEDURE — 63710000001 INSULIN LISPRO (HUMAN) PER 5 UNITS: Performed by: INTERNAL MEDICINE

## 2024-12-31 PROCEDURE — 82948 REAGENT STRIP/BLOOD GLUCOSE: CPT | Performed by: INTERNAL MEDICINE

## 2024-12-31 PROCEDURE — 82948 REAGENT STRIP/BLOOD GLUCOSE: CPT

## 2024-12-31 RX ORDER — QUETIAPINE FUMARATE 25 MG/1
50 TABLET, FILM COATED ORAL NIGHTLY
Status: DISCONTINUED | OUTPATIENT
Start: 2024-12-31 | End: 2025-01-01

## 2024-12-31 RX ADMIN — LEVOTHYROXINE SODIUM 175 MCG: 0.03 TABLET ORAL at 05:21

## 2024-12-31 RX ADMIN — QUETIAPINE FUMARATE 12.5 MG: 25 TABLET ORAL at 07:59

## 2024-12-31 RX ADMIN — QUETIAPINE FUMARATE 50 MG: 25 TABLET ORAL at 21:09

## 2024-12-31 RX ADMIN — INSULIN LISPRO 2 UNITS: 100 INJECTION, SOLUTION INTRAVENOUS; SUBCUTANEOUS at 18:03

## 2024-12-31 RX ADMIN — WHITE PETROLATUM 1 APPLICATION: 1.75 OINTMENT TOPICAL at 12:52

## 2024-12-31 RX ADMIN — MIDODRINE HYDROCHLORIDE 10 MG: 10 TABLET ORAL at 12:51

## 2024-12-31 RX ADMIN — ACETAMINOPHEN 650 MG: 325 TABLET ORAL at 09:36

## 2024-12-31 RX ADMIN — ARFORMOTEROL TARTRATE 15 MCG: 15 SOLUTION RESPIRATORY (INHALATION) at 21:58

## 2024-12-31 RX ADMIN — BUDESONIDE 0.5 MG: 0.5 INHALANT ORAL at 21:58

## 2024-12-31 RX ADMIN — Medication 2.5 MG: at 21:09

## 2024-12-31 RX ADMIN — FAMOTIDINE 10 MG: 20 TABLET, FILM COATED ORAL at 12:51

## 2024-12-31 NOTE — NURSING NOTE
Duration of Treatment 3.5 Hours  Access Site AVF  Dialyzer Revaclear   mL/min  Dialysate Temperature (C) 36  Use Crit-Line? No  BFR-As tolerated to a maximum of: 400 mL/min  Prime Dialyzer With NS to Priming Volume? Yes  Dialysate Solution Bath: K+ = 3 mEq, CA = 2.5mg  Bicarb 30 meq  Na+ 137 meq  Fluid Removal: 0.5    Patient ran 3hrs 12mins of treatment, came off early due to restlessness. No fluid removal because of BP levels, -300mls UF with BVP of 72.1  Reported off to primary nurse.

## 2024-12-31 NOTE — PLAN OF CARE
Goal Outcome Evaluation:  Plan of Care Reviewed With: patient, child        Progress: no change  Outcome Evaluation: pt still alert to self. Dr. Wang the daughter visit him last night. Had his CT of his head last night. Daughter remain at bedside and attentive to pt. Safety sitter at bedside. Medicated per MAR. Skin care performed this shift. No other concerns at this time. Continue plan of care

## 2024-12-31 NOTE — PLAN OF CARE
Goal Outcome Evaluation:           Progress: no change  Outcome Evaluation: Pt alert to self only. Family at bedside most of shift. Dialysis completed today. Per report from dialysis, BP dropped, fluids were given. BP improved. 2 units of insulin given at dinner, . safety sitter at bedside. Medicated per MAR. Skin care completed per order

## 2024-12-31 NOTE — PROGRESS NOTES
[facility]     Psychiatric Progress Note    Patient Name: Nelson Wang  : 1945  MRN: [id number]  Primary Care Physician:  Domingo Bravo MD  Date of admission: 2024    Subjective   Subjective     Patient seen and chart reviewed, discussed with staff.    Chief Complaint: Altered mental status      HPI:     Patient dialysis.  Does not participate in interview.  Appears rather tired.  Acknowledges by looking in this interviewer's direction, but does not participate in interview.    He required a dose of 12.5 mg quetiapine on an as needed basis this morning for restlessness and to help get through dialysis.  This appears to have been effective.  He has not been combative.    Continues to have poor sleep.  We will increase dose of quetiapine at bedtime to 50 mg      Objective   Objective     Vitals:   Temp:  [96.2 °F (35.7 °C)-98.3 °F (36.8 °C)] 98.3 °F (36.8 °C)  Heart Rate:  [] 108  Resp:  [16-22] 16  BP: ()/(39-87) 105/47          Mental Status Exam:      Appearance:   Weak, frail  Reliability:   Poor  Eye Contact:   Limited  Concentration/Focus:    Inattentive, does not participate  Behaviors:    Restless at times, no acute agitation  Memory :    Unable to fully assess, has had some confusion  Speech:    Minimal  Language:   Appropriate  Mood :    Dysthymic  Affect:    Flat  Thought process:    Guarded  Thought Content:    No suicidal or homicidal ideation, may have hallucinations at times but none acute during interview  Insight:   Limited  Judgement:    Fair      Result Review    Result Review:  I have personally reviewed the results from the time of this admission to 2024 11:24 EST and agree with these findings:  []  Laboratory  []  Microbiology  []  Radiology  []  EKG/Telemetry   []  Cardiology/Vascular   []  Pathology  []  Old records  []  Other:  Most notable findings include:     Lab Results (last 24 hours)       Procedure Component Value Units Date/Time    POC Glucose  Finger BID AC [[id number]]  (Abnormal) Collected: 12/31/24 0754    Specimen: Blood from Finger Updated: 12/31/24 0755     Glucose 118 mg/dL      Comment: Serial Number: [id number]:  [id number]       Basic Metabolic Panel [[id number]]  (Abnormal) Collected: 12/31/24 0521    Specimen: Blood from Hand, Right Updated: 12/31/24 0641     Glucose 101 mg/dL      BUN 38 mg/dL      Creatinine 7.05 mg/dL      Sodium 134 mmol/L      Potassium 5.3 mmol/L      Chloride 97 mmol/L      CO2 22.9 mmol/L      Calcium 9.9 mg/dL      BUN/Creatinine Ratio 5.4     Anion Gap 14.1 mmol/L      eGFR 7.4 mL/min/1.73     Narrative:      GFR Categories in Chronic Kidney Disease (CKD)      GFR Category          GFR (mL/min/1.73)    Interpretation  G1                     90 or greater         Normal or high (1)  G2                      60-89                Mild decrease (1)  G3a                   45-59                Mild to moderate decrease  G3b                   30-44                Moderate to severe decrease  G4                    15-29                Severe decrease  G5                    14 or less           Kidney failure          (1)In the absence of evidence of kidney disease, neither GFR category G1 or G2 fulfill the criteria for CKD.    eGFR calculation 2021 CKD-EPI creatinine equation, which does not include race as a factor    Extra Tubes [[id number]] Collected: 12/31/24 0521    Specimen: Blood from Hand, Right Updated: 12/31/24 0615    Narrative:      The following orders were created for panel order Extra Tubes.  Procedure                               Abnormality         Status                     ---------                               -----------         ------                     Lavender Top[[id number]]                                     Final result                 Please view results for these tests on the individual orders.    Lavender Top [[id number]] Collected: 12/31/24 0521    Specimen: Blood from Hand, Right Updated:  12/31/24 0615     Extra Tube hold for add-on     Comment: Auto resulted       POC Glucose Once [[id number]]  (Abnormal) Collected: 12/30/24 1623    Specimen: Blood Updated: 12/30/24 1626     Glucose 119 mg/dL      Comment: Serial Number: [id number]:  [id number]                   Medications:   arformoterol, 15 mcg, Nebulization, BID - RT  budesonide, 0.5 mg, Nebulization, BID - RT  famotidine, 10 mg, Oral, Daily  insulin lispro, 2-7 Units, Subcutaneous, BID AC  levothyroxine, 175 mcg, Oral, Q AM  melatonin, 2.5 mg, Oral, Nightly  midodrine, 10 mg, Oral, Once per day on Tuesday Thursday Saturday  mineral oil-hydrophilic petrolatum, 1 Application, Topical, Daily  QUEtiapine, 50 mg, Oral, Nightly  sodium chloride, 10 mL, Intravenous, Q12H          Assessment / Plan       Active Hospital Problems:  Active Hospital Problems    Diagnosis     **Hypoxia     Gram-positive bacteremia     ESRD (end stage renal disease)     Essential hypertension     Type 2 diabetes mellitus without complication        Plan:     Increased at bedtime dose quetiapine to 50 mg  Continue 12.5 mg quetiapine as needed for agitation or restlessness  We will follow make treatment recommendations as indicated      Disposition:        Part of this note may be an electronic transcription/translation of spoken language to printed text using the Dragon dictation system.         Electronically signed by Emeka Vines MD, 12/31/24, 11:24 AM EST.

## 2024-12-31 NOTE — PROGRESS NOTES
[facility]     Nephrology Progress Note      Patient Name: Nelson Wang  : 1945  MRN: [id number]  Primary Care Physician:  Domingo Bravo MD  Date of admission: 2024    Subjective   Subjective     Interval History:  Still confused this morning No distress but seems restless.  Tolerating p.o.    Seen on dialysis,  no events.    Objective   Objective     Vitals:   Temp:  [96.2 °F (35.7 °C)-98.5 °F (36.9 °C)] 98.3 °F (36.8 °C)  Heart Rate:  [] 108  Resp:  [16-22] 16  BP: (101-141)/(45-87) 113/50  Physical Exam:   constitutional: Awake, alert, confused, hard of hearing, mildly restless.    Eyes: sclerae anicteric, no conjunctival injection   HENT: mucous membranes moist.     Neck: Supple, no thyromegaly, no lymphadenopathy, trachea midline, No JVD   Respiratory: Decreased to auscultation bilaterally, nonlabored respirations.   Cardiovascular: RRR, no murmurs, rubs, or gallops.   Gastrointestinal: Positive bowel sounds, soft,  nondistended   Musculoskeletal: No edema, no clubbing or cyanosis.  Left upper extremity AV fistula, patent.   Neurologic: moving extremities, answering questions but inconsistently.  Incoherent at times.   Skin: warm and dry, no rashes, areas of hypopigmentation.    Result Review    Result Reviewed:  I have personally reviewed the results from the time of this admission to 2024 10:19 EST and agree with these findings:  [x]  Laboratory  []  Microbiology  [x]  Radiology  []  EKG/Telemetry   []  Cardiology/Vascular   []  Pathology  [x]  Old records  []  Other:        Lab 24  0521 24  0538 24  0921 24  0535 24  0810   SODIUM 134* 132* 134* 133* 135*   POTASSIUM 5.3* 4.5 4.3 4.2 4.5   CHLORIDE 97* 94* 93* 94* 96*   CO2 22.9 23.0 22.3 25.1 26.4   BUN 38* 25* 32* 22 41*   CREATININE 7.05* 5.37* 6.16* 4.35* 5.33*   GLUCOSE 101* 120* 174* 107* 98   EGFR 7.4* 10.3* 8.7* 13.2* 10.3*   ANION GAP 14.1 15.0 18.7* 13.9 12.6   MAGNESIUM  --   --   2.1 2.0  --    PHOSPHORUS  --  4.5 5.8*  --   --            Assessment & Plan   Assessment / Plan       Active Hospital Problems:  Active Hospital Problems    Diagnosis     **Hypoxia     Gram-positive bacteremia     ESRD (end stage renal disease)     Essential hypertension     Type 2 diabetes mellitus without complication      Assessment and Plan:    - ESRD, was on home dialysis PTA, left upper extremity AV fistula for access.  Electrolytes are stable.  Dialysis today.  Now looking for rehab placement per notes.  Family at bedside today unsure if they would like to go home or rehab.  If outpt dialysis needed will need to arrange for this prior to discharge but will depending on disposition which seems to be developing at this time.      - Aspiration pneumonia, and possibly recurrent aspiration, per pulmonary.      - Type 2 diabetes with complications.     - Hypotension blood pressure is acceptable now, on ProAmatine and blood pressure stable.     - Anemia of chronic kidney disease, on RONALD as outpatient.  Getting Epogen while here.     - Altered mentation.  Remains confused.  Non-verbal with me today on dialysis.  Unlikely uremia given consistent dialysis to this point.      - Status post fall, no apparent injury.

## 2024-12-31 NOTE — PROGRESS NOTES
[facility]   Hospitalist Progress Note  Date: 2024  Patient Name: Nelson Wang  : 1945  MRN: [id number]  Date of admission: 2024  Room/Bed: [facility]      Subjective   Subjective     Chief Complaint: Shortness of breath    Summary:Nelson Wang is a 79 y.o. male ESRD on dialysis, type 2 diabetes, dementia, discitis on vancomycin, A-fib, restrictive lung disease who presents to the emergency department for evaluation of hypoxia and shortness of breath. Patient was started on supplemental oxygen to maintain his oxygen saturations. He was given a dose of Bumex overnight. His chest x-ray was consistent with bilateral pleural effusions. Pulmonology was consulted for the bilateral pleural effusions and and nephrology consulted for possible volume overload. Pulmonology was performing a thoracentesis this morning. Thoracentesis was showing bloody fluid. Patient suffered CODE BLUE. Likely bleeding into his airways causing hypoxic arrest. He received CPR for 6 minutes and achieved ROSC. He was transferred to the ICU, intubated and on blood pressure support. Bronchoscopy was showing blood clots in his airways. Patient was started on CRRT. Patient's medical status is tenuous. He is currently on 1 pressor. CRRT is removing fluid at a slow rate. Patient started on micafungin on 2024. Patient extubated on 2024. Patient on high flow nasal cannula 30 L 25%. Slowly weaning down on supplemental oxygen, breathing more comfortably. Poor mental status still waxing and waning suspect underlying dementia with history of alcohol abuse, mixed with hospital delirium and hyperactive delirium.  Patient continues on hemodialysis with midodrine on dialysis days has required extra Seroquel and occasional as needed medications for sedation to allow him to tolerate hemodialysis.  Working on mentation and trying to get back home ultimately.     Patient has completed IV Vanco and micafungin course while in the  "hospital.    Interval Followup:    Patient was set to be discharged home on December 28 after dialysis.  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  After dialysis family requested that patient stay a couple hours to \"sleep off \"the Ativan.  During that time patient had a fall.  Per nursing note it stated that the thump was hard and patient was found down on the floor.  Overnight physician was contacted as well as patient's daughter Dr. Wang.  Patient had head CT as well as cervical spine CT and right elbow x-ray which were unremarkable.  However this morning patient is stating a lot of pain with ambulation and pain in his legs.  Patient had right knee and femur x-ray which were negative for any fracture.   CT of the pelvis also appears fine.   At this time family is now requesting patient not discharged home and be evaluated for rehab placement.    Patient continues to be weak. Complain of pain with ambulation  PT was deferred.  See notes  Family initially wanted rehab placement and now are thinking they want to take patient back home.       Review of Systems    All systems reviewed and negative except for what is outlined above.      Objective   Objective     Vitals:   Temp:  [96.2 °F (35.7 °C)-98.5 °F (36.9 °C)] 98.3 °F (36.8 °C)  Heart Rate:  [] 107  Resp:  [16-22] 18  BP: (101-141)/(45-87) 103/60    Physical Exam   Gen: Patient seen in dialysis. Patient continues to have a hard time tolerating dialysis.  He is trying to pull out his lines and trying to get out of bed.  He requires a sitter in order to tolerate dialysis  Cards: RRR  Pulm: No respiratory distress  Abd: soft, nondistended  Extremities: no pitting edema  Msk: pain with ambulation. Patient in hips bilaterally. Pain in knees   Neuro: speech clear, remains confused     Result Review    Result Review:  I have personally reviewed these " results:  [x]  Laboratory      Lab 12/28/24  0921 12/27/24  0535   WBC 5.60 5.59   HEMOGLOBIN 9.1* 9.3*   HEMATOCRIT 30.5* 31.4*   PLATELETS 230 244   MCV 90.8 90.2         Lab 12/31/24  0521 12/30/24  0538 12/28/24  0921 12/27/24  0535   SODIUM 134* 132* 134* 133*   POTASSIUM 5.3* 4.5 4.3 4.2   CHLORIDE 97* 94* 93* 94*   CO2 22.9 23.0 22.3 25.1   ANION GAP 14.1 15.0 18.7* 13.9   BUN 38* 25* 32* 22   CREATININE 7.05* 5.37* 6.16* 4.35*   EGFR 7.4* 10.3* 8.7* 13.2*   GLUCOSE 101* 120* 174* 107*   CALCIUM 9.9 9.9 10.1 10.2   MAGNESIUM  --   --  2.1 2.0   PHOSPHORUS  --  4.5 5.8*  --          Lab 12/30/24  0538 12/28/24  0921   ALBUMIN 3.1* 3.1*                       Brief Urine Lab Results  (Last result in the past 365 days)        Color   Clarity   Blood   Leuk Est   Nitrite   Protein   CREAT   Urine HCG        10/27/24 1417 Yellow   Cloudy   Negative   Trace   Negative   >=300 mg/dL (3+)                 [x]  Microbiology   Microbiology Results (last 10 days)       Procedure Component Value - Date/Time    Blood Culture - Blood, Hand, Right [[id number]]  (Normal) Collected: 12/23/24 0453    Lab Status: Final result Specimen: Blood from Hand, Right Updated: 12/28/24 0530     Blood Culture No growth at 5 days    Narrative:      Less than seven (7) mL's of blood was collected.  Insufficient quantity may yield false negative results.    Blood Culture - Blood, Arm, Right [[id number]]  (Normal) Collected: 12/23/24 0453    Lab Status: Final result Specimen: Blood from Arm, Right Updated: 12/28/24 0530     Blood Culture No growth at 5 days    Narrative:      Less than seven (7) mL's of blood was collected.  Insufficient quantity may yield false negative results.          [x]  Radiology  CT Head Without Contrast    Result Date: 12/30/2024  Impression: 1. No intracranial hemorrhage or acute intracranial abnormality.. 2. Generalized cerebral atrophy with moderate white matter findings of chronic microvascular disease.  Electronically Signed: Lasha Ramos MD  12/30/2024 10:38 PM EST  Workstation ID: [id number]    XR Ankle 2 View Right    Result Date: 12/30/2024  Impression: 1. Negative for acute fracture. 2. Stable chronic findings above. Electronically Signed: Lasha Ramos MD  12/30/2024 3:29 PM EST  Workstation ID: [id number]    CT Abdomen Pelvis Without Contrast    Result Date: 12/29/2024  Impression: CT scan of the abdomen and pelvis without contrast demonstrating small right effusion, slightly larger. Consolidation and/or atelectasis in the right lower lobe appears slightly worsened. 1.1 cm nonobstructing stone in the right renal pelvis is unchanged. The kidneys have an atrophic appearance. Pancolonic diverticulosis without diverticulitis. Chronic compression fractures of T12 and L3 appear stable. Electronically Signed: Clifford Mooney MD  12/29/2024 6:13 PM EST  Workstation ID: [id number]    XR Knee 1 or 2 View Right    Result Date: 12/29/2024  Impression: Negative for displaced fracture or joint malalignment involving right femur or knee. Radiographically uncomplicated hip prosthesis. Electronically Signed: Lorne Bustamante MD  12/29/2024 11:11 AM EST  Workstation ID: [id number]    XR Femur 2 View Right    Result Date: 12/29/2024  Impression: Negative for displaced fracture or joint malalignment involving right femur or knee. Radiographically uncomplicated hip prosthesis. Electronically Signed: Lorne Bustamante MD  12/29/2024 11:11 AM EST  Workstation ID: [id number]    XR Elbow 2 View Right    Result Date: 12/28/2024  No acute fracture or acute malalignment is identified.   Portions of this note were completed with a voice recognition program.  Electronically Signed By-Stan Hogan MD On:12/28/2024 11:33 PM     []  EKG/Telemetry   []  Cardiology/Vascular   []  Pathology  []  Old records  []  Other:    Assessment & Plan   Assessment / Plan     Assessment/Plan (clinically significant if listed here)  Cardiac arrest secondary to hypoxemia  in setting of thoracentesis  Acute hypoxic/hypercapnic respiratory failure requiring mechanical ventilation  Acute metabolic encephalopathy/altered mental status, initial concern for seizures, EEG negative x 2  Acute on chronic diastolic heart failure  Acute cardiogenic pulmonary edema  Bilateral pleural effusions  Hemoptysis  Candidemia s/p iv micafungin course completed 12/22/24  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  Continue to monitor closely for further aspiration, continue with aspiration cautions and adjusted diet per speech.    discussed with ID previously, has completed IV vancomycin course for staph epi discitis, apparently Dr. Sigala (ID in [place]) saw him and 6 week course would be completed 12/14.  iv micafungin 14d course completed 12/22  EEG from prior hospitalization 11/15 and 12/10 without seizure activity noted on limited study.  Keppra has been dc'd given concern of contribution to mentation.  Will continue to monitor without this.    12/1 ct head had a small hematoma, no acute abnormality, has generalized parenchymal volume loss noted, suspicious for underlying component of dementia,  along with uremia/hospital delirium contributing to current issues with mentation   Continue Synthroid  Continue SSI and monitor blood glucose  Psychiatry has increased his bedtime dose of Seroquel to 50 mg.  Patient is to continue with 12.5 mg of Seroquel as needed for agitation or restlessness  Continue famotidine  Continue PT/OT  Given that patient had a fall  after dialysis on 12-28  family is now  requesting rehab placement as patient is having pain with ambulation.  CT of the abdomen pelvis fine. .  X-rays so far have been negative for any fracture  Today family states they are now thinking about taking patient home.    Overall prognosis continues to remain poor      Dispo: Family now would like rehab placement for short term      Discussed with RN.    VTE Prophylaxis:  Pharmacologic & mechanical VTE prophylaxis orders are present.        CODE STATUS:   Level Of Support Discussed With: Next of Kin (If No Surrogate)  Code Status (Patient has no pulse and is not breathing): No CPR (Do Not Attempt to Resuscitate)  Medical Interventions (Patient has pulse or is breathing): Full Support      Electronically signed by Harshal Higuera DO, 12/31/2024, 09:21 EST.

## 2024-12-31 NOTE — SIGNIFICANT NOTE
12/31/24 0857   Physical Therapy Time and Intention   Session Not Performed patient unavailable for treatment   Comment, Session Not Performed Pt is gone for dialysis.

## 2025-01-01 ENCOUNTER — APPOINTMENT (OUTPATIENT)
Dept: GENERAL RADIOLOGY | Facility: HOSPITAL | Age: 80
End: 2025-01-01
Payer: MEDICARE

## 2025-01-01 LAB
ANION GAP SERPL CALCULATED.3IONS-SCNC: 10.9 MMOL/L (ref 5–15)
BUN SERPL-MCNC: 23 MG/DL (ref 8–23)
BUN/CREAT SERPL: 5.4 (ref 7–25)
CALCIUM SPEC-SCNC: 9.9 MG/DL (ref 8.6–10.5)
CHLORIDE SERPL-SCNC: 96 MMOL/L (ref 98–107)
CO2 SERPL-SCNC: 28.1 MMOL/L (ref 22–29)
CREAT SERPL-MCNC: 4.28 MG/DL (ref 0.76–1.27)
DEPRECATED RDW RBC AUTO: 65.5 FL (ref 37–54)
EGFRCR SERPLBLD CKD-EPI 2021: 13.5 ML/MIN/1.73
ERYTHROCYTE [DISTWIDTH] IN BLOOD BY AUTOMATED COUNT: 19.9 % (ref 12.3–15.4)
GLUCOSE BLDC GLUCOMTR-MCNC: 117 MG/DL (ref 70–99)
GLUCOSE BLDC GLUCOMTR-MCNC: 148 MG/DL (ref 70–99)
GLUCOSE SERPL-MCNC: 128 MG/DL (ref 65–99)
HCT VFR BLD AUTO: 28.1 % (ref 37.5–51)
HGB BLD-MCNC: 8.2 G/DL (ref 13–17.7)
MCH RBC QN AUTO: 26.5 PG (ref 26.6–33)
MCHC RBC AUTO-ENTMCNC: 29.2 G/DL (ref 31.5–35.7)
MCV RBC AUTO: 90.9 FL (ref 79–97)
PLATELET # BLD AUTO: 206 10*3/MM3 (ref 140–450)
PMV BLD AUTO: 10.8 FL (ref 6–12)
POTASSIUM SERPL-SCNC: 4.5 MMOL/L (ref 3.5–5.2)
RBC # BLD AUTO: 3.09 10*6/MM3 (ref 4.14–5.8)
SODIUM SERPL-SCNC: 135 MMOL/L (ref 136–145)
WBC NRBC COR # BLD AUTO: 9.03 10*3/MM3 (ref 3.4–10.8)

## 2025-01-01 PROCEDURE — 94664 DEMO&/EVAL PT USE INHALER: CPT

## 2025-01-01 PROCEDURE — 71045 X-RAY EXAM CHEST 1 VIEW: CPT

## 2025-01-01 PROCEDURE — 99233 SBSQ HOSP IP/OBS HIGH 50: CPT | Performed by: FAMILY MEDICINE

## 2025-01-01 PROCEDURE — 85027 COMPLETE CBC AUTOMATED: CPT | Performed by: INTERNAL MEDICINE

## 2025-01-01 PROCEDURE — 94799 UNLISTED PULMONARY SVC/PX: CPT

## 2025-01-01 PROCEDURE — 80048 BASIC METABOLIC PNL TOTAL CA: CPT | Performed by: INTERNAL MEDICINE

## 2025-01-01 PROCEDURE — 94761 N-INVAS EAR/PLS OXIMETRY MLT: CPT

## 2025-01-01 PROCEDURE — 94760 N-INVAS EAR/PLS OXIMETRY 1: CPT

## 2025-01-01 PROCEDURE — 82948 REAGENT STRIP/BLOOD GLUCOSE: CPT

## 2025-01-01 RX ORDER — QUETIAPINE FUMARATE 25 MG/1
37.5 TABLET, FILM COATED ORAL NIGHTLY
Status: DISCONTINUED | OUTPATIENT
Start: 2025-01-01 | End: 2025-01-08 | Stop reason: HOSPADM

## 2025-01-01 RX ORDER — QUETIAPINE FUMARATE 25 MG/1
25 TABLET, FILM COATED ORAL NIGHTLY
Status: DISCONTINUED | OUTPATIENT
Start: 2025-01-01 | End: 2025-01-01

## 2025-01-01 RX ADMIN — QUETIAPINE FUMARATE 37.5 MG: 25 TABLET ORAL at 20:10

## 2025-01-01 RX ADMIN — LEVOTHYROXINE SODIUM 175 MCG: 0.03 TABLET ORAL at 12:03

## 2025-01-01 RX ADMIN — FAMOTIDINE 10 MG: 20 TABLET, FILM COATED ORAL at 12:03

## 2025-01-01 RX ADMIN — ACETAMINOPHEN 650 MG: 325 TABLET ORAL at 12:03

## 2025-01-01 RX ADMIN — Medication 2.5 MG: at 20:10

## 2025-01-01 RX ADMIN — WHITE PETROLATUM 1 APPLICATION: 1.75 OINTMENT TOPICAL at 12:05

## 2025-01-01 RX ADMIN — ARFORMOTEROL TARTRATE 15 MCG: 15 SOLUTION RESPIRATORY (INHALATION) at 20:30

## 2025-01-01 RX ADMIN — BUDESONIDE 0.5 MG: 0.5 INHALANT ORAL at 20:30

## 2025-01-01 RX ADMIN — SENNOSIDES AND DOCUSATE SODIUM 2 TABLET: 50; 8.6 TABLET ORAL at 12:03

## 2025-01-01 RX ADMIN — ARFORMOTEROL TARTRATE 15 MCG: 15 SOLUTION RESPIRATORY (INHALATION) at 07:01

## 2025-01-01 RX ADMIN — DICLOFENAC SODIUM 2 G: 10 GEL TOPICAL at 21:50

## 2025-01-01 RX ADMIN — BUDESONIDE 0.5 MG: 0.5 INHALANT ORAL at 07:01

## 2025-01-01 NOTE — PROGRESS NOTES
[facility]   Hospitalist Progress Note  Date: 2025  Patient Name: Nelson Wang  : 1945  MRN: [id number]  Date of admission: 2024      Subjective   Subjective     Chief Complaint: Follow-up shortness of breath    Summary:Nelson Wang is a 79 y.o. 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 Patient was started on supplemental oxygen to maintain his oxygen saturations. He was given a dose of Bumex overnight. His chest x-ray was consistent with bilateral pleural effusions. Pulmonology was consulted for the bilateral pleural effusions and and nephrology consulted for possible volume overload. Pulmonology was performing a thoracentesis this morning. Thoracentesis was showing bloody fluid. Patient suffered CODE BLUE. Likely bleeding into his airways causing hypoxic arrest. He received CPR for 6 minutes and achieved ROSC. He was transferred to the ICU, intubated and on blood pressure support. Bronchoscopy was showing blood clots in his airways. Patient was started on CRRT. Patient's medical status is tenuous. He is currently on 1 pressor. CRRT is removing fluid at a slow rate. Patient started on micafungin on 2024. Patient extubated on 2024. Patient on high flow nasal cannula 30 L 25%. Slowly weaning down on supplemental oxygen, breathing more comfortably. Poor mental status still waxing and waning suspect underlying dementia with history of alcohol abuse, mixed with hospital delirium and hyperactive delirium.  Patient continues on hemodialysis with midodrine on dialysis days has required extra Seroquel and occasional as needed medications for sedation to allow him to tolerate hemodialysis.  Working on mentation and initially trying to get back home though patient had a fall on the evening of 2024 and has had difficulty with  transfers and ambulation since.  No fractures on radiographs.  Family considering discharge to inpatient rehab.    Interval Followup: Patient sitting up in bed appears to be resting fairly comfortably with family at the bedside.  Family reports a possible aspiration event last evening.  Patient complaining of some heaviness in his left lower extremity though able to lift it off the bed without difficulty.  Patient still complaining of some tenderness in the right thigh though improved per family at the bedside.  Afebrile overnight.  Heart rate slightly elevated.  Sats found to be in the 70s overnight.  Patient requiring 2 to 3 L nasal cannula keep sats greater than 90%.  Chest x-ray minimally changed with residual low lung volumes, cardiomegaly and small right-sided pleural effusion, patchy bibasilar airspace opacities concerning for atelectasis versus pneumonia.  White blood cell count within normal limits.  Potassium improved.  Blood sugars fairly well-controlled.  Hemoglobin trending down.  No other issues per nursing.    Review of Systems  Constitutional: Negative for fatigue and fever.   HENT: Negative for sore throat and trouble swallowing.    Eyes: Negative for pain and discharge.   Respiratory: Positive for cough and shortness of breath.    Cardiovascular: Negative for chest pain and palpitations.   Gastrointestinal: Negative for abdominal pain, nausea and vomiting.   Endocrine: Negative for cold intolerance and heat intolerance.   Genitourinary: Negative for difficulty urinating and dysuria.   Musculoskeletal: Negative for back pain and neck stiffness.   Skin: Negative for color change and rash.   Neurological: Negative for syncope and headaches.   Hematological: Negative for adenopathy.   Psychiatric/Behavioral: Positive for confusion and negative hallucinations.    Objective   Objective     Vitals:   Temp:  [97.3 °F (36.3 °C)-99.5 °F (37.5 °C)] 97.3 °F (36.3 °C)  Heart Rate:  [] 103  Resp:  [16-20]  16  BP: (115-142)/(44-91) 126/44  Flow (L/min) (Oxygen Therapy):  [2-3] 2  Physical Exam   General: well-developed appearing stated age in no acute distress  HEENT: Normocephalic atraumatic moist membranes pupils equal round reactive light, no scleral icterus no conjunctival injection  Cardiovascular: Irregularly irregular, no lower extremity edema appreciated  Pulmonary: Bibasilar crackles no wheezes or rhonchi symmetric chest expansion, unlabored, no conversational dyspnea appreciated  Gastrointestinal: Soft nontender nondistended positive bowel sounds all 4 quadrants no rebound or guarding  Musculoskeletal: No clubbing cyanosis, warm and well-perfused, calves soft symmetric nontender bilaterally  Skin: Clean dry without rashes  Neuro: Cranial nerves II through XII intact grossly no sensorimotor deficits appreciated bilateral upper and lower extremities  Psych: Patient is calm cooperative and appropriate with exam not responding to internal stimuli  : No Kolb catheter no bladder distention no suprapubic tenderness    Result Review    Result Review:  I have personally reviewed these results and agree with these findings:  [x]  Laboratory  LAB RESULTS:      Lab 01/01/25  0544 12/28/24  0921 12/27/24  0535   WBC 9.03 5.60 5.59   HEMOGLOBIN 8.2* 9.1* 9.3*   HEMATOCRIT 28.1* 30.5* 31.4*   PLATELETS 206 230 244   MCV 90.9 90.8 90.2         Lab 01/01/25  0544 12/31/24  0521 12/30/24  0538 12/28/24  0921 12/27/24  0535   SODIUM 135* 134* 132* 134* 133*   POTASSIUM 4.5 5.3* 4.5 4.3 4.2   CHLORIDE 96* 97* 94* 93* 94*   CO2 28.1 22.9 23.0 22.3 25.1   ANION GAP 10.9 14.1 15.0 18.7* 13.9   BUN 23 38* 25* 32* 22   CREATININE 4.28* 7.05* 5.37* 6.16* 4.35*   EGFR 13.5* 7.4* 10.3* 8.7* 13.2*   GLUCOSE 128* 101* 120* 174* 107*   CALCIUM 9.9 9.9 9.9 10.1 10.2   MAGNESIUM  --   --   --  2.1 2.0   PHOSPHORUS  --   --  4.5 5.8*  --          Lab 12/30/24  0538 12/28/24  0921   ALBUMIN 3.1* 3.1*                     Brief Urine Lab  Results  (Last result in the past 365 days)        Color   Clarity   Blood   Leuk Est   Nitrite   Protein   CREAT   Urine HCG        10/27/24 1417 Yellow   Cloudy   Negative   Trace   Negative   >=300 mg/dL (3+)                 Microbiology Results (last 10 days)       Procedure Component Value - Date/Time    Blood Culture - Blood, Hand, Right [[id number]]  (Normal) Collected: 12/23/24 0453    Lab Status: Final result Specimen: Blood from Hand, Right Updated: 12/28/24 0530     Blood Culture No growth at 5 days    Narrative:      Less than seven (7) mL's of blood was collected.  Insufficient quantity may yield false negative results.    Blood Culture - Blood, Arm, Right [[id number]]  (Normal) Collected: 12/23/24 0453    Lab Status: Final result Specimen: Blood from Arm, Right Updated: 12/28/24 0530     Blood Culture No growth at 5 days    Narrative:      Less than seven (7) mL's of blood was collected.  Insufficient quantity may yield false negative results.            [x]  Microbiology  [x]  Radiology  XR Chest 1 View    Result Date: 1/1/2025  Impression: The nasogastric tube has been removed. Otherwise no significant change from previous examination. Low lung volumes, cardiomegaly, small right pleural effusion and patchy bibasilar airspace opacities, atelectasis versus pneumonia. Electronically Signed: Solange Iverson MD  1/1/2025 2:26 PM EST  Workstation ID: [id number]    CT Head Without Contrast    Result Date: 12/30/2024  Impression: 1. No intracranial hemorrhage or acute intracranial abnormality.. 2. Generalized cerebral atrophy with moderate white matter findings of chronic microvascular disease. Electronically Signed: Lasha Ramos MD  12/30/2024 10:38 PM EST  Workstation ID: [id number]    XR Ankle 2 View Right    Result Date: 12/30/2024  Impression: 1. Negative for acute fracture. 2. Stable chronic findings above. Electronically Signed: Lasha Ramos MD  12/30/2024 3:29 PM EST  Workstation ID: [id number]    CT Abdomen  Pelvis Without Contrast    Result Date: 12/29/2024  Impression: CT scan of the abdomen and pelvis without contrast demonstrating small right effusion, slightly larger. Consolidation and/or atelectasis in the right lower lobe appears slightly worsened. 1.1 cm nonobstructing stone in the right renal pelvis is unchanged. The kidneys have an atrophic appearance. Pancolonic diverticulosis without diverticulitis. Chronic compression fractures of T12 and L3 appear stable. Electronically Signed: Clifford Mooney MD  12/29/2024 6:13 PM EST  Workstation ID: [id number]    XR Knee 1 or 2 View Right    Result Date: 12/29/2024  Impression: Negative for displaced fracture or joint malalignment involving right femur or knee. Radiographically uncomplicated hip prosthesis. Electronically Signed: Lorne Bustamante MD  12/29/2024 11:11 AM EST  Workstation ID: [id number]    XR Femur 2 View Right    Result Date: 12/29/2024  Impression: Negative for displaced fracture or joint malalignment involving right femur or knee. Radiographically uncomplicated hip prosthesis. Electronically Signed: Lorne Bustamante MD  12/29/2024 11:11 AM EST  Workstation ID: [id number]    XR Elbow 2 View Right    Result Date: 12/28/2024  No acute fracture or acute malalignment is identified.   Portions of this note were completed with a voice recognition program.  Electronically Signed By-Stan Hogan MD On:12/28/2024 11:33 PM       []  EKG/Telemetry   []  Cardiology/Vascular   []  Pathology  []  Old records  [x]  Other:  Scheduled Meds:arformoterol, 15 mcg, Nebulization, BID - RT  budesonide, 0.5 mg, Nebulization, BID - RT  famotidine, 10 mg, Oral, Daily  insulin lispro, 2-7 Units, Subcutaneous, BID AC  levothyroxine, 175 mcg, Oral, Q AM  melatonin, 2.5 mg, Oral, Nightly  midodrine, 10 mg, Oral, Once per day on Tuesday Thursday Saturday  mineral oil-hydrophilic petrolatum, 1 Application, Topical, Daily  QUEtiapine, 25 mg, Oral, Nightly  sodium chloride, 10 mL,  Intravenous, Q12H      Continuous Infusions:   PRN Meds:.  acetaminophen    artificial tears    senna-docusate sodium **AND** polyethylene glycol **AND** [DISCONTINUED] bisacodyl **AND** bisacodyl    calamine    dextrose    dextrose    Diclofenac Sodium    glucagon (human recombinant)    heparin (porcine)    heparin (porcine)    hydrocortisone ace-pramoxine    ipratropium-albuterol    Lidocaine    ondansetron    Polyvinyl Alcohol-Povidone PF    QUEtiapine    sodium chloride    sodium chloride    sodium chloride    trolamine salicylate      Assessment & Plan   Assessment / Plan     Assessment/Plan:  Cardiac arrest secondary to hypoxemia in setting of thoracentesis  Acute hypoxic/hypercapnic respiratory failure requiring mechanical ventilation  Acute metabolic encephalopathy/altered mental status, initial concern for seizures, EEG negative x 2  Acute on chronic diastolic heart failure  Acute cardiogenic pulmonary edema  Bilateral pleural effusions  Hemoptysis  Candidemia s/p iv micafungin course completed 12/22/24  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  Atrial fibrillation on Eliquis  ESRD on home HD   Type 2 diabetes  Mild nonobstructive CAD per 5/29/2024 cath  Suspect underlying dementia with episodes of possible delirium and hyperactive delirium  Fall on 12-28  Normocytic anemia  Concern for aspiration  Hypothyroidism          Patient admitted for further evaluation and treatment  Nephrology, psychiatry and pulmonology critical care consulted thank you for your assistance  Continue supplemental oxygen titrate to keep sats greater than 90%  Encourage pulmonary toilet  Initiate bronchopulmonary hygiene protocol  Encourage incentive spirometry  Continue aspiration precautions  Reconsult speech to reevaluate due to concern for aspiration  Downgrade diet to mechanical ground  Continue Brovana, Pulmicort scheduled with as needed DuoNebs  Continue hemodialysis and ultrafiltration per  nephrology  Continue midodrine on dialysis days  Continue to hold Eliquis due to hemoptysis and hemothorax  Continue sliding scale insulin  Decrease Seroquel to 25 mg at night due to lethargy and concern for increased aspiration risk  Continue physical therapy Occupational Therapy  Continue monitor anemia and transfuse as needed  Continue levothyroxine  Further inpatient orders or recommendations pending clinical course         Discussed plan with bedside RN.    Disposition: Inpatient rehab.  Discharge planning coordinating.    VTE Prophylaxis:  Pharmacologic & mechanical VTE prophylaxis orders are present.        CODE STATUS:   Level Of Support Discussed With: Next of Kin (If No Surrogate)  Code Status (Patient has no pulse and is not breathing): No CPR (Do Not Attempt to Resuscitate)  Medical Interventions (Patient has pulse or is breathing): Full Support

## 2025-01-01 NOTE — PROGRESS NOTES
[facility]     Nephrology Progress Note      Patient Name: Nelson Wang  : 1945  MRN: [id number]  Primary Care Physician:  Domingo Bravo MD  Date of admission: 2024    Subjective   Subjective     Interval History:  Still confused   No distress but seems restless.  Tolerating p.o.    Possible aspiration, cxr ordered today.    Objective   Objective     Vitals:   Temp:  [97.5 °F (36.4 °C)-99.5 °F (37.5 °C)] 97.5 °F (36.4 °C)  Heart Rate:  [] 98  Resp:  [18-20] 18  BP: (115-142)/(49-91) 139/91  Flow (L/min) (Oxygen Therapy):  [2-3] 2  Physical Exam:   constitutional: Awake, alert, confused, hard of hearing, mildly restless.    Eyes: sclerae anicteric, no conjunctival injection   HENT: mucous membranes moist.     Neck: Supple, no thyromegaly, no lymphadenopathy, trachea midline, No JVD   Respiratory: Decreased to auscultation bilaterally, nonlabored respirations.   Cardiovascular: RRR, no murmurs, rubs, or gallops.   Gastrointestinal: Positive bowel sounds, soft,  nondistended   Musculoskeletal: No edema, no clubbing or cyanosis.  Left upper extremity AV fistula, patent.   Neurologic: moving extremities, answering questions but inconsistently.  Incoherent at times.   Skin: warm and dry, no rashes, areas of hypopigmentation.    Result Review    Result Reviewed:  I have personally reviewed the results from the time of this admission to 2025 13:51 EST and agree with these findings:  [x]  Laboratory  []  Microbiology  [x]  Radiology  []  EKG/Telemetry   []  Cardiology/Vascular   []  Pathology  [x]  Old records  []  Other:        Lab 25  0544 24  0521 24  0538 24  0921 24  0535   SODIUM 135* 134* 132* 134* 133*   POTASSIUM 4.5 5.3* 4.5 4.3 4.2   CHLORIDE 96* 97* 94* 93* 94*   CO2 28.1 22.9 23.0 22.3 25.1   BUN 23 38* 25* 32* 22   CREATININE 4.28* 7.05* 5.37* 6.16* 4.35*   GLUCOSE 128* 101* 120* 174* 107*   EGFR 13.5* 7.4* 10.3* 8.7* 13.2*   ANION GAP 10.9 14.1  15.0 18.7* 13.9   MAGNESIUM  --   --   --  2.1 2.0   PHOSPHORUS  --   --  4.5 5.8*  --            Assessment & Plan   Assessment / Plan       Active Hospital Problems:  Active Hospital Problems    Diagnosis     **Hypoxia     Gram-positive bacteremia     ESRD (end stage renal disease)     Essential hypertension     Type 2 diabetes mellitus without complication      Assessment and Plan:    - ESRD, was on home dialysis PTA, left upper extremity AV fistula for access.  Electrolytes are stable.  Dialysis TTS for now.  Now looking for rehab placement per notes.  Family unsure if they would like to go home or rehab.  If outpt dialysis needed will need to arrange for this prior to discharge but will depending on disposition which seems to be developing at this time.  Dialysis again tomorrow.      - Aspiration pneumonia, and possibly recurrent aspiration, per pulmonary.      - Type 2 diabetes with complications.     - Hypotension blood pressure is acceptable now, on ProAmatine and blood pressure stable.     - Anemia of chronic kidney disease, on RONALD as outpatient.  Getting Epogen while here.     - Altered mentation.  Remains confused.  Non-verbal with me.  Unlikely uremia given consistent dialysis to this point.      - Status post fall, no apparent injury.

## 2025-01-01 NOTE — PLAN OF CARE
Goal Outcome Evaluation:  Plan of Care Reviewed With: patient        Progress: no change  Outcome Evaluation: pt unable to answer questions more confused. Pt is sating at 85-87% was put on 3L NC and sating around 90-93%. Safety sitter at bedside. Skin care performed this shift. Medicated per MAR. Medicated voltaren gel for pain. No other concerns at this time. Continue plan of care. On room air now. Satting at 98-99%.

## 2025-01-02 ENCOUNTER — APPOINTMENT (OUTPATIENT)
Dept: CT IMAGING | Facility: HOSPITAL | Age: 80
End: 2025-01-02
Payer: MEDICARE

## 2025-01-02 LAB
ANION GAP SERPL CALCULATED.3IONS-SCNC: 8.6 MMOL/L (ref 5–15)
B PARAPERT DNA SPEC QL NAA+PROBE: NOT DETECTED
B PERT DNA SPEC QL NAA+PROBE: NOT DETECTED
BUN SERPL-MCNC: 38 MG/DL (ref 8–23)
BUN/CREAT SERPL: 6.4 (ref 7–25)
C PNEUM DNA NPH QL NAA+NON-PROBE: NOT DETECTED
CALCIUM SPEC-SCNC: 9.9 MG/DL (ref 8.6–10.5)
CHLORIDE SERPL-SCNC: 102 MMOL/L (ref 98–107)
CO2 SERPL-SCNC: 29.4 MMOL/L (ref 22–29)
CREAT SERPL-MCNC: 5.94 MG/DL (ref 0.76–1.27)
DEPRECATED RDW RBC AUTO: 66.8 FL (ref 37–54)
EGFRCR SERPLBLD CKD-EPI 2021: 9.1 ML/MIN/1.73
ERYTHROCYTE [DISTWIDTH] IN BLOOD BY AUTOMATED COUNT: 19.9 % (ref 12.3–15.4)
FLUAV SUBTYP SPEC NAA+PROBE: NOT DETECTED
FLUBV RNA ISLT QL NAA+PROBE: NOT DETECTED
GLUCOSE BLDC GLUCOMTR-MCNC: 112 MG/DL (ref 70–99)
GLUCOSE BLDC GLUCOMTR-MCNC: 160 MG/DL (ref 70–99)
GLUCOSE SERPL-MCNC: 118 MG/DL (ref 65–99)
HADV DNA SPEC NAA+PROBE: NOT DETECTED
HCOV 229E RNA SPEC QL NAA+PROBE: NOT DETECTED
HCOV HKU1 RNA SPEC QL NAA+PROBE: NOT DETECTED
HCOV NL63 RNA SPEC QL NAA+PROBE: NOT DETECTED
HCOV OC43 RNA SPEC QL NAA+PROBE: NOT DETECTED
HCT VFR BLD AUTO: 27.5 % (ref 37.5–51)
HGB BLD-MCNC: 7.8 G/DL (ref 13–17.7)
HMPV RNA NPH QL NAA+NON-PROBE: NOT DETECTED
HPIV1 RNA ISLT QL NAA+PROBE: NOT DETECTED
HPIV2 RNA SPEC QL NAA+PROBE: NOT DETECTED
HPIV3 RNA NPH QL NAA+PROBE: NOT DETECTED
HPIV4 P GENE NPH QL NAA+PROBE: NOT DETECTED
M PNEUMO IGG SER IA-ACNC: NOT DETECTED
MCH RBC QN AUTO: 26.3 PG (ref 26.6–33)
MCHC RBC AUTO-ENTMCNC: 28.4 G/DL (ref 31.5–35.7)
MCV RBC AUTO: 92.6 FL (ref 79–97)
PLATELET # BLD AUTO: 194 10*3/MM3 (ref 140–450)
PMV BLD AUTO: 9.9 FL (ref 6–12)
POTASSIUM SERPL-SCNC: 5 MMOL/L (ref 3.5–5.2)
QT INTERVAL: 319 MS
QT INTERVAL: 348 MS
QT INTERVAL: 371 MS
QTC INTERVAL: 432 MS
QTC INTERVAL: 442 MS
QTC INTERVAL: 455 MS
RBC # BLD AUTO: 2.97 10*6/MM3 (ref 4.14–5.8)
RHINOVIRUS RNA SPEC NAA+PROBE: NOT DETECTED
RSV RNA NPH QL NAA+NON-PROBE: NOT DETECTED
SARS-COV-2 RNA RESP QL NAA+PROBE: NOT DETECTED
SODIUM SERPL-SCNC: 140 MMOL/L (ref 136–145)
WBC NRBC COR # BLD AUTO: 7.11 10*3/MM3 (ref 3.4–10.8)

## 2025-01-02 PROCEDURE — 94664 DEMO&/EVAL PT USE INHALER: CPT

## 2025-01-02 PROCEDURE — 0202U NFCT DS 22 TRGT SARS-COV-2: CPT | Performed by: STUDENT IN AN ORGANIZED HEALTH CARE EDUCATION/TRAINING PROGRAM

## 2025-01-02 PROCEDURE — 85027 COMPLETE CBC AUTOMATED: CPT | Performed by: FAMILY MEDICINE

## 2025-01-02 PROCEDURE — 94799 UNLISTED PULMONARY SVC/PX: CPT

## 2025-01-02 PROCEDURE — 82948 REAGENT STRIP/BLOOD GLUCOSE: CPT | Performed by: INTERNAL MEDICINE

## 2025-01-02 PROCEDURE — 82948 REAGENT STRIP/BLOOD GLUCOSE: CPT

## 2025-01-02 PROCEDURE — 87040 BLOOD CULTURE FOR BACTERIA: CPT | Performed by: STUDENT IN AN ORGANIZED HEALTH CARE EDUCATION/TRAINING PROGRAM

## 2025-01-02 PROCEDURE — 97110 THERAPEUTIC EXERCISES: CPT

## 2025-01-02 PROCEDURE — 94761 N-INVAS EAR/PLS OXIMETRY MLT: CPT

## 2025-01-02 PROCEDURE — 25010000002 CEFTRIAXONE PER 250 MG: Performed by: STUDENT IN AN ORGANIZED HEALTH CARE EDUCATION/TRAINING PROGRAM

## 2025-01-02 PROCEDURE — 80048 BASIC METABOLIC PNL TOTAL CA: CPT | Performed by: INTERNAL MEDICINE

## 2025-01-02 PROCEDURE — 92526 ORAL FUNCTION THERAPY: CPT

## 2025-01-02 PROCEDURE — 93005 ELECTROCARDIOGRAM TRACING: CPT | Performed by: STUDENT IN AN ORGANIZED HEALTH CARE EDUCATION/TRAINING PROGRAM

## 2025-01-02 PROCEDURE — 63710000001 INSULIN LISPRO (HUMAN) PER 5 UNITS: Performed by: INTERNAL MEDICINE

## 2025-01-02 PROCEDURE — 99232 SBSQ HOSP IP/OBS MODERATE 35: CPT | Performed by: FAMILY MEDICINE

## 2025-01-02 PROCEDURE — 71250 CT THORAX DX C-: CPT

## 2025-01-02 PROCEDURE — 97530 THERAPEUTIC ACTIVITIES: CPT

## 2025-01-02 RX ORDER — AZITHROMYCIN 250 MG/1
500 TABLET, FILM COATED ORAL DAILY
Status: DISCONTINUED | OUTPATIENT
Start: 2025-01-03 | End: 2025-01-02

## 2025-01-02 RX ORDER — AZITHROMYCIN 250 MG/1
500 TABLET, FILM COATED ORAL DAILY
Status: COMPLETED | OUTPATIENT
Start: 2025-01-03 | End: 2025-01-05

## 2025-01-02 RX ORDER — HEPARIN SODIUM 5000 [USP'U]/ML
5000 INJECTION, SOLUTION INTRAVENOUS; SUBCUTANEOUS EVERY 8 HOURS SCHEDULED
Status: DISCONTINUED | OUTPATIENT
Start: 2025-01-02 | End: 2025-01-02

## 2025-01-02 RX ADMIN — ARFORMOTEROL TARTRATE 15 MCG: 15 SOLUTION RESPIRATORY (INHALATION) at 06:46

## 2025-01-02 RX ADMIN — ACETAMINOPHEN 650 MG: 325 TABLET ORAL at 01:10

## 2025-01-02 RX ADMIN — LEVOTHYROXINE SODIUM 175 MCG: 0.03 TABLET ORAL at 05:48

## 2025-01-02 RX ADMIN — BUDESONIDE 0.5 MG: 0.5 INHALANT ORAL at 06:46

## 2025-01-02 RX ADMIN — WHITE PETROLATUM 1 APPLICATION: 1.75 OINTMENT TOPICAL at 08:09

## 2025-01-02 RX ADMIN — MIDODRINE HYDROCHLORIDE 10 MG: 10 TABLET ORAL at 08:03

## 2025-01-02 RX ADMIN — SODIUM CHLORIDE 1000 MG: 9 INJECTION INTRAMUSCULAR; INTRAVENOUS; SUBCUTANEOUS at 22:50

## 2025-01-02 RX ADMIN — Medication 2.5 MG: at 20:37

## 2025-01-02 RX ADMIN — ARFORMOTEROL TARTRATE 15 MCG: 15 SOLUTION RESPIRATORY (INHALATION) at 21:25

## 2025-01-02 RX ADMIN — ACETAMINOPHEN 650 MG: 325 TABLET ORAL at 20:37

## 2025-01-02 RX ADMIN — FAMOTIDINE 10 MG: 20 TABLET, FILM COATED ORAL at 08:03

## 2025-01-02 RX ADMIN — BUDESONIDE 0.5 MG: 0.5 INHALANT ORAL at 21:25

## 2025-01-02 RX ADMIN — QUETIAPINE FUMARATE 37.5 MG: 25 TABLET ORAL at 20:37

## 2025-01-02 RX ADMIN — QUETIAPINE FUMARATE 12.5 MG: 25 TABLET ORAL at 08:03

## 2025-01-02 RX ADMIN — AZITHROMYCIN 500 MG: 250 TABLET, FILM COATED ORAL at 23:05

## 2025-01-02 RX ADMIN — DICLOFENAC SODIUM 2 G: 10 GEL TOPICAL at 08:09

## 2025-01-02 RX ADMIN — INSULIN LISPRO 2 UNITS: 100 INJECTION, SOLUTION INTRAVENOUS; SUBCUTANEOUS at 17:41

## 2025-01-02 NOTE — PROGRESS NOTES
Respiratory Therapist Broncho-Pulmonary Hygiene Progress Note      Patient Name:  Nelson Wang  YOB: 1945    Nelson Wang meets the qualification for Level 3 of the Bronco-Pulmonary Hygiene Protocol. This was based on my daily patient assessment and includes review of chest x-ray results, cough ability and quality, oxygenation, secretions or risk for secretion development and patient mobility.     Broncho-Pulmonary Hygiene Assessment:    Level of Movement: Low level of mobility  Lethargic uncoorperative    Breath Sounds: Diminished and/or coarse rhonchi    Cough: Ineffective, weak or not cough efforts    Chest X-Ray: Possible signs of consolidation and/or atelectasis or clear.     Sputum Productions: Able to produce small to moderate amount, of moderately thick secretions    History and Physical: Chronic condition    SpO2 to Oxygen Need: greater than 92% on room air or  less than 3L nasal canula    Current SpO2 is: 99 on 1.5l    Based on this information, I have completed the following interventions: CPT (precussor)      Electronically signed by Paul Haynes RRT, 01/02/25, 1:21 AM EST.

## 2025-01-02 NOTE — PROGRESS NOTES
"Nutrition Services    Patient Name: Nelson Wang  YOB: 1945  MRN: [id number]  Admission date: 12/5/2024      CLINICAL NUTRITION ASSESSMENT      Reason for Assessment  EN Follow Up   H&P:  Past Medical History:   Diagnosis Date    Abnormal stress test     Anemia     IRON INFUSIONS WITH DIALYSIS    Anesthesia     WITH HIP REPLACEMENT DAUGHTER BELIEVES HAD SVT 2000    Ankle pain, right     Anxiety and depression     Arthritis     CKD (chronic kidney disease), stage IV     DIALYSIS [id number] SHILEY IN RIGHT CHEST    Diabetes     GERD (gastroesophageal reflux disease)     Gout     High cholesterol     History of peritoneal dialysis     HL (hearing loss)     Hyperkalemia     Hypertension     Hypothyroidism     Insomnia     Night terrors     Psoriasis     Psoriasis     Sleep apnea     Sleep walking     Thrombocytopenia     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    Vitiligo         Current Problems:   Active Hospital Problems    Diagnosis     **Hypoxia     Gram-positive bacteremia     ESRD (end stage renal disease)     Essential hypertension     Type 2 diabetes mellitus without complication         Nutrition/Diet History         Narrative   Po intake of 25-50 documented. Renal, Puree diet in place, per SLP recommendation. Tolerating diet well, with no concerns noted. Po Novasource BID is in place for nutrition support. Continue nutrition interventions and RD to follow per protocol.      Anthropometrics        Current Height, Weight Height: 170.2 cm (67\")  Weight: 64.4 kg (141 lb 15.6 oz)   Current BMI Body mass index is 22.24 kg/m².   BMI Classification Normal range Healthy range for age 23-30   % IBW 95%   Adjusted Body Weight (ABW) N/A   Weight Hx  Wt Readings from Last 15 Encounters:   01/02/25 0417 64.4 kg (141 lb 15.6 oz)   01/01/25 0600 62 kg (136 lb 11 oz)   12/31/24 0600 62.7 kg (138 lb 3.7 oz)   12/30/24 0917 63.2 kg (139 lb 5.3 oz)   12/29/24 0500 61.8 kg (136 lb 3.9 oz)   12/28/24 0635 " 63 kg (138 lb 14.2 oz)   12/27/24 0541 63.1 kg (139 lb 1.8 oz)   12/26/24 0738 63.1 kg (139 lb 3.2 oz)   12/25/24 0333 67 kg (147 lb 11.3 oz)   12/24/24 0530 67.9 kg (149 lb 11.1 oz)   12/23/24 0418 68.1 kg (150 lb 2.1 oz)   12/22/24 0525 68.6 kg (151 lb 3.8 oz)   12/21/24 0500 75.4 kg (166 lb 3.6 oz)   12/20/24 0350 67.4 kg (148 lb 9.4 oz)   12/18/24 2009 65.3 kg (143 lb 15.4 oz)   12/18/24 0300 67 kg (147 lb 11.3 oz)   12/17/24 0440 65.8 kg (145 lb 1 oz)   12/16/24 0547 64.3 kg (141 lb 12.1 oz)   12/13/24 0407 62.5 kg (137 lb 12.6 oz)   12/12/24 0500 69.3 kg (152 lb 12.5 oz)   12/09/24 0600 70.2 kg (154 lb 12.2 oz)   12/06/24 1228 70.7 kg (155 lb 13.8 oz)   12/05/24 1850 75.3 kg (166 lb 0.1 oz)   12/01/24 1701 71 kg (156 lb 8.4 oz)   11/22/24 1311 71.8 kg (158 lb 3.2 oz)   11/18/24 0351 67.9 kg (149 lb 11.1 oz)   11/17/24 0255 71.9 kg (158 lb 8.2 oz)   11/16/24 0316 67.9 kg (149 lb 11.1 oz)   11/14/24 0611 73.9 kg (162 lb 14.7 oz)   11/13/24 0500 73 kg (161 lb)   11/12/24 0523 73.2 kg (161 lb 6 oz)   11/11/24 1200 72.8 kg (160 lb 7.9 oz)   11/10/24 0500 74.4 kg (164 lb 0.4 oz)   11/08/24 0640 73 kg (160 lb 14.4 oz)   11/07/24 0621 72.7 kg (160 lb 3.2 oz)   11/05/24 0600 68.9 kg (151 lb 14.4 oz)   11/04/24 1402 67 kg (147 lb 11.3 oz)   11/04/24 0410 67.9 kg (149 lb 11.1 oz)   11/01/24 0159 74.3 kg (163 lb 12.8 oz)   10/27/24 0700 72.5 kg (159 lb 13.3 oz)   10/27/24 0346 75.7 kg (166 lb 14.2 oz)   10/09/24 1402 75.7 kg (166 lb 12.8 oz)   10/08/24 1455 75.3 kg (166 lb 0.1 oz)   10/02/24 0812 77.5 kg (170 lb 13.7 oz)   09/28/24 0330 77.2 kg (170 lb 3.1 oz)   09/27/24 1659 76.2 kg (167 lb 15.9 oz)   09/26/24 0823 78.9 kg (174 lb)   09/16/24 1814 79.3 kg (174 lb 13.2 oz)   09/12/24 1344 77.1 kg (169 lb 15.6 oz)   08/30/24 0927 75.6 kg (166 lb 10.7 oz)   08/14/24 0922 76.7 kg (169 lb)   07/17/24 1116 77.2 kg (170 lb 3.2 oz)          Wt Change Observation I/O's reveal -5.3 L since admission (~12 #s)     Estimated/Assessed  Needs  Estimated Needs based on: Current Body Weight 62.5 kg       Energy Requirements 25-30 kcal/kg   EST Needs (kcal/day) 8229-9833        Protein Requirements 1.2-1.5 g.kg   EST Daily Needs (g/day) 75-94       Fluid Requirements 1mL/kcal    Estimated Needs (mL/day) 7020-3868     Labs/Medications Phos low, receiving K phos repletion.         Pertinent Labs Reviewed.   Results from last 7 days   Lab Units 01/02/25  0559 01/01/25  0544 12/31/24  0521   SODIUM mmol/L 140 135* 134*   POTASSIUM mmol/L 5.0 4.5 5.3*   CHLORIDE mmol/L 102 96* 97*   CO2 mmol/L 29.4* 28.1 22.9   BUN mg/dL 38* 23 38*   CREATININE mg/dL 5.94* 4.28* 7.05*   CALCIUM mg/dL 9.9 9.9 9.9   GLUCOSE mg/dL 118* 128* 101*     Results from last 7 days   Lab Units 01/02/25  0559 01/01/25  0544 12/30/24  0538 12/28/24  0921 12/27/24  0535 12/27/24  0535   MAGNESIUM mg/dL  --   --   --  2.1  --  2.0   PHOSPHORUS mg/dL  --   --  4.5 5.8*   < >  --    HEMOGLOBIN g/dL 7.8*   < >  --  9.1*  --  9.3*   HEMATOCRIT % 27.5*   < >  --  30.5*  --  31.4*    < > = values in this interval not displayed.     COVID19   Date Value Ref Range Status   10/08/2024 Not Detected Not Detected - Ref. Range Final     Lab Results   Component Value Date    HGBA1C 5.90 (H) 11/09/2024         Pertinent Medications Reviewed.     Malnutrition Severity Assessment              Nutrition Diagnosis         Nutrition Dx Problem 1 Inadequate oral Intake related to Inability to consume sufficient energy as evidenced by decreased appetite.     Nutrition Intervention           Current Nutrition Orders & Evaluation of Intake       Current PO Diet Diet: Renal; Low Potassium; No Straw; Texture: Pureed (NDD 1); Fluid Consistency: Thin (IDDSI 0)   Supplement Orders Placed This Encounter      Dietary Nutrition Supp[place] [facility] [place] [facility] [place]      Dietary Nutrition Supplements Magic Cup; butter pecan      DIET MESSAGE If no butter pecan flavor magic cup, please send what is available. Please send  his nepro ensure all meals as well.           Nutrition Intervention/Prescription        Continue diet as tolerated (Renal, Puree)  Continue Novasource BID        Medical Nutrition Therapy/Nutrition Education          Learner     Readiness N/A  N/A     Method     Response N/A  N/A     Monitor/Evaluation        Monitor PO intake, Pertinent labs, EN delivery/tolerance, GI status, Hemodynamic stability     Nutrition Discharge Plan         To be determined     Electronically signed by:  Stephie Garsia RD  01/02/25 15:07 EST

## 2025-01-02 NOTE — PROGRESS NOTES
[facility]     Nephrology Progress Note      Patient Name: Nelson Wang  : 1945  MRN: [id number]  Primary Care Physician:  Domingo Bravo MD  Date of admission: 2024    Subjective   Subjective     Interval History:  Still confused, but more awake today, verbal today.  No distress but seems restless.  Tolerating p.o.    Seen on dialysis.    Objective   Objective     Vitals:   Temp:  [97.3 °F (36.3 °C)-98.1 °F (36.7 °C)] 97.3 °F (36.3 °C)  Heart Rate:  [] 86  Resp:  [16-18] 18  BP: (108-139)/(44-91) 109/59  Flow (L/min) (Oxygen Therapy):  [1.5-2] 1.5  Physical Exam:   constitutional: Awake, alert, confused, hard of hearing, mildly restless.    Eyes: sclerae anicteric, no conjunctival injection   HENT: mucous membranes moist.     Neck: Supple, no thyromegaly, no lymphadenopathy, trachea midline, No JVD   Respiratory: Decreased to auscultation bilaterally, nonlabored respirations.   Cardiovascular: RRR, no murmurs, rubs, or gallops.   Gastrointestinal: Positive bowel sounds, soft,  nondistended   Musculoskeletal: No edema, no clubbing or cyanosis.  Left upper extremity AV fistula, patent.   Neurologic: moving extremities, answering questions but inconsistently.  Incoherent at times.   Skin: warm and dry, no rashes, areas of hypopigmentation.    Result Review    Result Reviewed:  I have personally reviewed the results from the time of this admission to 2025 09:05 EST and agree with these findings:  [x]  Laboratory  []  Microbiology  [x]  Radiology  []  EKG/Telemetry   []  Cardiology/Vascular   []  Pathology  [x]  Old records  []  Other:        Lab 25  0559 25  0544 24  0521 24  0538 24  0921 24  0535   SODIUM 140 135* 134* 132* 134* 133*   POTASSIUM 5.0 4.5 5.3* 4.5 4.3 4.2   CHLORIDE 102 96* 97* 94* 93* 94*   CO2 29.4* 28.1 22.9 23.0 22.3 25.1   BUN 38* 23 38* 25* 32* 22   CREATININE 5.94* 4.28* 7.05* 5.37* 6.16* 4.35*   GLUCOSE 118* 128* 101* 120*  174* 107*   EGFR 9.1* 13.5* 7.4* 10.3* 8.7* 13.2*   ANION GAP 8.6 10.9 14.1 15.0 18.7* 13.9   MAGNESIUM  --   --   --   --  2.1 2.0   PHOSPHORUS  --   --   --  4.5 5.8*  --            Assessment & Plan   Assessment / Plan       Active Hospital Problems:  Active Hospital Problems    Diagnosis     **Hypoxia     Gram-positive bacteremia     ESRD (end stage renal disease)     Essential hypertension     Type 2 diabetes mellitus without complication      Assessment and Plan:    - ESRD, was on home dialysis PTA, left upper extremity AV fistula for access.  Electrolytes are stable.  Dialysis TTS for now.  Now looking for rehab placement per notes.  Family unsure if they would like to go home or rehab.  If outpt dialysis needed will need to arrange for this prior to discharge but will depending on disposition which seems to be developing at this time. UF goal reduced today with low bp.      - Aspiration pneumonia, and possibly recurrent aspiration, per pulmonary.      - Type 2 diabetes with complications.     - Hypotension blood pressure is acceptable now, on ProAmatine and blood pressure stable.     - Anemia of chronic kidney disease, on RONALD as outpatient.  Getting Epogen while here.     - Altered mentation.  Remains confused.  Unlikely uremia given consistent dialysis to this point.      - Status post fall, no apparent injury.

## 2025-01-02 NOTE — NURSING NOTE
Duration of Treatment 4.0 Hours                                  4 hrs    Access Site AVF   Dialyzer Revaclear    mL/min   Dialysate Temperature (C) 36   Use Crit-Line? No   BFR-As tolerated to a maximum of: 400 mL/min   Prime Dialyzer With NS to Priming Volume? Yes   Dialysate Solution Bath: K+ = 3 mEq, CA = 2.5mg   Bicarb 33 mEq   Na+ 137 meq   Fluid Removal: 2L                                            0     Pt completed 4 hour treatment.  Sitter and family at bedside and pt premedicated prior to coming to unit.  Pt was restless throughout treatment and confused, pulling at lines and tubes.  Prior to even starting treatment pt's BP was low and fluid removal was not started.  Dr. Cha in room right after treatment initiated and gave verbal order to change fluid removal to zero.  No fluid removed during this HD treatment.  BP was taken every 15 min and last BP was 146/63 with .  Clean and dry dressing on L AVF site.  Report given by HIREN Israel to HIREN Hunter.

## 2025-01-02 NOTE — PLAN OF CARE
Goal Outcome Evaluation:  Plan of Care Reviewed With: child, patient        Progress: no change    Outcome Evaluation: Unable to answer questions, more confused after dialysis, given Seroquel prior to dialysis. Pt would not leave oxygen on, O2 sats were 87-89 on RA. O2 sats 91-92 on 1.5 liters NC. Safety sitter at bedside. Skin care promoted this shift. Medicated per MAR. No other concerns at this time. Continue plan of care.

## 2025-01-02 NOTE — THERAPY TREATMENT NOTE
Acute Care - Speech Language Pathology   Swallow Treatment Note BRIA Sullivan     Patient Name: Nelson Wang  : 1945  MRN: [id number]  Today's Date: 2025               Admit Date: 2024    Visit Dx:     ICD-10-CM ICD-9-CM   1. Acute respiratory failure with hypoxia  J96.01 518.81   2. Acute pulmonary edema  J81.0 518.4   3. Difficulty walking  R26.2 719.7   4. Oropharyngeal dysphagia  R13.12 787.22     Patient Active Problem List   Diagnosis    Essential hypertension    Bradycardia, sinus    Anemia due to chronic kidney disease    Hypothyroidism    Idiopathic gout    Proteinuria    Psoriasis    Thrombocytopenia    Type 2 diabetes mellitus without complication    CKD (chronic kidney disease), stage IV    Hyperlipidemia    Monoclonal gammopathy of unknown significance (MGUS)    Stage 5 chronic kidney disease    Chronic gout without tophus    Peritoneal dialysis catheter dysfunction    Medicare annual wellness visit, subsequent    Chronic cough    Peritonitis associated with peritoneal dialysis    Recurrent pneumonia    Acute on chronic respiratory failure with hypoxia    ESRD (end stage renal disease)    Aspiration pneumonitis    Sepsis    Type 2 diabetes mellitus, with long-term current use of insulin    GERD without esophagitis    Immunosuppression due to drug therapy    Bipolar disorder    Chronic respiratory failure with hypoxia    Shortness of breath    Abnormal stress test    ESRD on dialysis    Abnormal chest CT    Encounter for screening for malignant neoplasm of colon    History of colon polyps    Family history of colon cancer    Intractable pain    Abdominal pain    ESRD (end stage renal disease) on dialysis    AMS (altered mental status)    Hallucinations    LUQ pain    Discitis    Metabolic encephalopathy    Aspiration pneumonia    Discitis of lumbar region    Severe malnutrition    Dementia    Unspecified severe protein-calorie malnutrition    Hypoxia    Gram-positive bacteremia     Past  Medical History:   Diagnosis Date    Abnormal stress test     Anemia     IRON INFUSIONS WITH DIALYSIS    Anesthesia     WITH HIP REPLACEMENT DAUGHTER BELIEVES HAD SVT 2000    Ankle pain, right     Anxiety and depression     Arthritis     CKD (chronic kidney disease), stage IV     DIALYSIS [id number] SHILEY IN RIGHT CHEST    Diabetes     GERD (gastroesophageal reflux disease)     Gout     High cholesterol     History of peritoneal dialysis     HL (hearing loss)     Hyperkalemia     Hypertension     Hypothyroidism     Insomnia     Night terrors     Psoriasis     Psoriasis     Sleep apnea     Sleep walking     Thrombocytopenia     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    Vitiligo      Past Surgical History:   Procedure Laterality Date    ABDOMINAL SURGERY      ANKLE SURGERY Right 11/1990    APPENDECTOMY N/A 1954    ARTERIOVENOUS FISTULA/SHUNT SURGERY Left 07/16/2024    Procedure: Creation of left arm arteriovenous fistula;  Surgeon: Moses Mir MD;  Location: [facility] MAIN OR;  Service: Vascular;  Laterality: Left;    BRONCHOSCOPY N/A 03/01/2024    Procedure: BRONCHOSCOPY WITH BAL AND WASHINGS;  Surgeon: Sandra Espinal MD;  Location: [facility] ENDOSCOPY;  Service: Pulmonary;  Laterality: N/A;  PNEUMONIA    BRONCHOSCOPY N/A 08/30/2024    Procedure: BRONCHOSCOPY WITH BALS AND WASHINGS;  Surgeon: Sandra Espinal MD;  Location: [facility] ENDOSCOPY;  Service: Pulmonary;  Laterality: N/A;  MUCOUS PLUGGING    CARDIAC CATHETERIZATION N/A 05/29/2024    Procedure: Left Heart Cath with possible coronary angioplasty;  Surgeon: Stephan Nichols MD;  Location: [facility] CATH INVASIVE LOCATION;  Service: Cardiology;  Laterality: N/A;    COLONOSCOPY N/A 06/2022    [facility]    ENDOSCOPY N/A 10/28/2024    Procedure: ESOPHAGOGASTRODUODENOSCOPY INSERTION OF LIGHTED INSTRUMENT TO VIEW ESOPHAGUS, STOMACH AND SMALL INTESTINE;  Surgeon: Kingsley Peraza MD;  Location: [facility] ENDOSCOPY;  Service: Gastroenterology;  Laterality:  N/A;  Gastritis    FRACTURE SURGERY      HIP BIPOLAR REPLACEMENT Right 01/2000    INSERTION HEMODIALYSIS CATHETER Left 04/09/2024    Procedure: HEMODIALYSIS CATHETER INSERTION;  Surgeon: Jose Berry MD;  [facility];  Service: General;  Laterality: Left;    INSERTION HEMODIALYSIS CATHETER N/A 04/12/2024    Procedure: Tunneled hemodialysis catheter insertion;  Surgeon: Enrique Vinson MD;  [facility];  Service: Vascular;  Laterality: N/A;    INSERTION PERITONEAL DIALYSIS CATHETER N/A 03/27/2023    Procedure: LAPAROSCOPIC INSERTION PERITONEAL DIALYSIS CATHETER, LAPAROSCOPIC OMENTOPEXY WITH LYSIS OF ADHESIONS;  Surgeon: Jose Berry MD;  [facility];  Service: General;  Laterality: N/A;    INSERTION PERITONEAL DIALYSIS CATHETER Left 07/23/2023    Procedure: REVISION OF PERITONEAL DIALYSIS CATHETER;  Surgeon: Radha Oreilly MD;  [facility];  Service: General;  Laterality: Left;    JOINT REPLACEMENT      REMOVAL PERITONEAL DIALYSIS CATHETER N/A 04/09/2024    Procedure: REMOVAL PERITONEAL DIALYSIS CATHETER;  Surgeon: Jose Berry MD;  Location: [facility] MAIN OR;  Service: General;  Laterality: N/A;    RENAL BIOPSY Left 07/15/2022    UPPER GASTROINTESTINAL ENDOSCOPY      VASCULAR SURGERY      WRIST SURGERY      UNSURE WHICH SIDE DAUGHTER REPORTS HAD SEVERE  CUT FROM WINDOW AND THEY HAD TO DO RECONSTRUCTIVE SURGERY WITH VESSELS AND NERVES     SPEECH PATHOLOGY DYSPHAGIA TREATMENT     Subjective/Behavioral Observations: On entry aide assisting with breakfast.  Patient demonstrating decreased alertness to task.  Note per chart review possible aspiration event occurred recently.       Day/time of Treatment: 1/2/2025:        Current Diet: Mechanical soft, thin        Current Strategies:One-on-one assist to feed self only when patient is fully awake, alert, and participating.  Alternate small bites and small sips of solids and liquids  at a slow rate.  Check swallow each bite/sip.         Treatment received: Dysphagia therapy to address swallow function through exercises and education of strategies.        Results of treatment: Patient required max cueing to maintain attention to task.  Single bites of mechanical ground solids with patient pocketing, holding bolus, requiring max cueing to complete swallow.  Thin liquids by cup with delayed swallow, coughing noted x 1.  She reports patient was successful in taking 1 medication this a.m.        Progress toward goals: Minimal     Barriers to Achieving goals: Medical status        Plan of care:/changes in plan: 1.  Change of diet to purée solids, thin liquid.  No straw.  2. positioning fully upright for all p.o. intake and 30 minutes following.  3. one-to-one assist to feed self, alternate small bites and small sips at a slow rate.  Check for pocketing.  Assist to feed self only when patient is fully alert, participating to feed self.    **Note patient must be fully alert for any p.o. intake.                                                                                               EDUCATION  The patient has been educated in the following areas:   Modified Diet Instruction.                Time Calculation:                Katie Isaacs, SLP  1/2/2025

## 2025-01-02 NOTE — PROGRESS NOTES
[facility]   Hospitalist Progress Note  Date: 2025  Patient Name: Nelson aWng  : 1945  MRN: [id number]  Date of admission: 2024      Subjective   Subjective     Chief Complaint: Follow-up shortness of breath    Summary:Nelson Wang is a 79 y.o. 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 Patient was started on supplemental oxygen to maintain his oxygen saturations. He was given a dose of Bumex overnight. His chest x-ray was consistent with bilateral pleural effusions. Pulmonology was consulted for the bilateral pleural effusions and and nephrology consulted for possible volume overload. Pulmonology was performing a thoracentesis this morning. Thoracentesis was showing bloody fluid. Patient suffered CODE BLUE. Likely bleeding into his airways causing hypoxic arrest. He received CPR for 6 minutes and achieved ROSC. He was transferred to the ICU, intubated and on blood pressure support. Bronchoscopy was showing blood clots in his airways. Patient was started on CRRT. Patient's medical status is tenuous. He is currently on 1 pressor. CRRT is removing fluid at a slow rate. Patient started on micafungin on 2024. Patient extubated on 2024. Patient on high flow nasal cannula 30 L 25%. Slowly weaning down on supplemental oxygen, breathing more comfortably. Poor mental status still waxing and waning suspect underlying dementia with history of alcohol abuse, mixed with hospital delirium and hyperactive delirium.  Patient continues on hemodialysis with midodrine on dialysis days has required extra Seroquel and occasional as needed medications for sedation to allow him to tolerate hemodialysis.  Working on mentation and initially trying to get back home though patient had a fall on the evening of 2024 and has had difficulty with  transfers and ambulation since.  No fractures on radiographs.  Family considering discharge to inpatient rehab.    Interval Followup: Patient sitting up in bed during dialysis resting fairly comfortably with family at the bedside.  Nurse reports patient rested fairly well overnight on current dose of Seroquel.  Patient having issues keeping his left upper extremity straight during dialysis today family helping to redirect.  Afebrile overnight.  Heart rate remains slightly elevated.  Blood pressure low initially at the beginning of dialysis.  Patient requiring 1.5 L nasal cannula keep sats greater than 90%.    White blood cell count within normal limits.  Potassium improved.  Blood sugars well-controlled.  Hemoglobin trending down.  No other issues per nursing.    Review of Systems  Constitutional: Negative for fatigue and fever.   HENT: Negative for sore throat and trouble swallowing.    Eyes: Negative for pain and discharge.   Respiratory: Positive for cough and shortness of breath.    Cardiovascular: Negative for chest pain and palpitations.   Gastrointestinal: Negative for abdominal pain, nausea and vomiting.   Endocrine: Negative for cold intolerance and heat intolerance.   Genitourinary: Negative for difficulty urinating and dysuria.   Musculoskeletal: Negative for back pain and neck stiffness.   Skin: Negative for color change and rash.   Neurological: Negative for syncope and headaches.   Hematological: Negative for adenopathy.   Psychiatric/Behavioral: Positive for confusion and negative hallucinations.    Objective   Objective     Vitals:   Temp:  [97.3 °F (36.3 °C)-98.8 °F (37.1 °C)] 98.8 °F (37.1 °C)  Heart Rate:  [] 109  Resp:  [16-18] 18  BP: ()/(42-67) 152/52  Flow (L/min) (Oxygen Therapy):  [1.5-2] 1.5  Physical Exam   General: well-developed appearing stated age in no acute distress  HEENT: Normocephalic atraumatic moist membranes pupils equal round reactive light, no scleral icterus no  conjunctival injection  Cardiovascular: Irregularly irregular, no lower extremity edema appreciated  Pulmonary: Bibasilar crackles no wheezes or rhonchi symmetric chest expansion, unlabored, no conversational dyspnea appreciated  Gastrointestinal: Soft nontender nondistended positive bowel sounds all 4 quadrants no rebound or guarding  Musculoskeletal: No clubbing cyanosis, warm and well-perfused, calves soft symmetric nontender bilaterally  Skin: Clean dry without rashes  Neuro: Cranial nerves II through XII intact grossly no sensorimotor deficits appreciated bilateral upper and lower extremities  Psych: Patient is calm cooperative and appropriate with exam not responding to internal stimuli  : No Kolb catheter no bladder distention no suprapubic tenderness    Result Review    Result Review:  I have personally reviewed these results and agree with these findings:  [x]  Laboratory  LAB RESULTS:      Lab 01/02/25  0559 01/01/25  0544 12/28/24  0921 12/27/24  0535   WBC 7.11 9.03 5.60 5.59   HEMOGLOBIN 7.8* 8.2* 9.1* 9.3*   HEMATOCRIT 27.5* 28.1* 30.5* 31.4*   PLATELETS 194 206 230 244   MCV 92.6 90.9 90.8 90.2         Lab 01/02/25  0559 01/01/25  0544 12/31/24  0521 12/30/24  0538 12/28/24  0921 12/27/24  0535   SODIUM 140 135* 134* 132* 134* 133*   POTASSIUM 5.0 4.5 5.3* 4.5 4.3 4.2   CHLORIDE 102 96* 97* 94* 93* 94*   CO2 29.4* 28.1 22.9 23.0 22.3 25.1   ANION GAP 8.6 10.9 14.1 15.0 18.7* 13.9   BUN 38* 23 38* 25* 32* 22   CREATININE 5.94* 4.28* 7.05* 5.37* 6.16* 4.35*   EGFR 9.1* 13.5* 7.4* 10.3* 8.7* 13.2*   GLUCOSE 118* 128* 101* 120* 174* 107*   CALCIUM 9.9 9.9 9.9 9.9 10.1 10.2   MAGNESIUM  --   --   --   --  2.1 2.0   PHOSPHORUS  --   --   --  4.5 5.8*  --          Lab 12/30/24  0538 12/28/24  0921   ALBUMIN 3.1* 3.1*                     Brief Urine Lab Results  (Last result in the past 365 days)        Color   Clarity   Blood   Leuk Est   Nitrite   Protein   CREAT   Urine HCG        10/27/24 1417 Yellow    Cloudy   Negative   Trace   Negative   >=300 mg/dL (3+)                 Microbiology Results (last 10 days)       ** No results found for the last 240 hours. **            [x]  Microbiology  [x]  Radiology  XR Chest 1 View    Result Date: 1/1/2025  Impression: The nasogastric tube has been removed. Otherwise no significant change from previous examination. Low lung volumes, cardiomegaly, small right pleural effusion and patchy bibasilar airspace opacities, atelectasis versus pneumonia. Electronically Signed: Solange Iverson MD  1/1/2025 2:26 PM EST  Workstation ID: [id number]    CT Head Without Contrast    Result Date: 12/30/2024  Impression: 1. No intracranial hemorrhage or acute intracranial abnormality.. 2. Generalized cerebral atrophy with moderate white matter findings of chronic microvascular disease. Electronically Signed: Lasha Ramos MD  12/30/2024 10:38 PM EST  Workstation ID: [id number]    XR Ankle 2 View Right    Result Date: 12/30/2024  Impression: 1. Negative for acute fracture. 2. Stable chronic findings above. Electronically Signed: Lasha Ramos MD  12/30/2024 3:29 PM EST  Workstation ID: [id number]    CT Abdomen Pelvis Without Contrast    Result Date: 12/29/2024  Impression: CT scan of the abdomen and pelvis without contrast demonstrating small right effusion, slightly larger. Consolidation and/or atelectasis in the right lower lobe appears slightly worsened. 1.1 cm nonobstructing stone in the right renal pelvis is unchanged. The kidneys have an atrophic appearance. Pancolonic diverticulosis without diverticulitis. Chronic compression fractures of T12 and L3 appear stable. Electronically Signed: Clifford Mooney MD  12/29/2024 6:13 PM EST  Workstation ID: [id number]    XR Knee 1 or 2 View Right    Result Date: 12/29/2024  Impression: Negative for displaced fracture or joint malalignment involving right femur or knee. Radiographically uncomplicated hip prosthesis. Electronically Signed: Lorne Bustamante MD   12/29/2024 11:11 AM EST  Workstation ID: [id number]    XR Femur 2 View Right    Result Date: 12/29/2024  Impression: Negative for displaced fracture or joint malalignment involving right femur or knee. Radiographically uncomplicated hip prosthesis. Electronically Signed: Lorne Bustamante MD  12/29/2024 11:11 AM EST  Workstation ID: [id number]    XR Elbow 2 View Right    Result Date: 12/28/2024  No acute fracture or acute malalignment is identified.   Portions of this note were completed with a voice recognition program.  Electronically Signed By-Stan Hogan MD On:12/28/2024 11:33 PM       []  EKG/Telemetry   []  Cardiology/Vascular   []  Pathology  []  Old records  [x]  Other:  Scheduled Meds:arformoterol, 15 mcg, Nebulization, BID - RT  budesonide, 0.5 mg, Nebulization, BID - RT  famotidine, 10 mg, Oral, Daily  insulin lispro, 2-7 Units, Subcutaneous, BID AC  levothyroxine, 175 mcg, Oral, Q AM  melatonin, 2.5 mg, Oral, Nightly  midodrine, 10 mg, Oral, Once per day on Tuesday Thursday Saturday  mineral oil-hydrophilic petrolatum, 1 Application, Topical, Daily  QUEtiapine, 37.5 mg, Oral, Nightly  sodium chloride, 10 mL, Intravenous, Q12H      Continuous Infusions:   PRN Meds:.  acetaminophen    artificial tears    senna-docusate sodium **AND** polyethylene glycol **AND** [DISCONTINUED] bisacodyl **AND** bisacodyl    calamine    dextrose    dextrose    Diclofenac Sodium    glucagon (human recombinant)    heparin (porcine)    heparin (porcine)    hydrocortisone ace-pramoxine    ipratropium-albuterol    Lidocaine    ondansetron    Polyvinyl Alcohol-Povidone PF    QUEtiapine    sodium chloride    sodium chloride    sodium chloride    trolamine salicylate      Assessment & Plan   Assessment / Plan     Assessment/Plan:  Cardiac arrest secondary to hypoxemia in setting of thoracentesis  Acute hypoxic/hypercapnic respiratory failure requiring mechanical ventilation  Acute metabolic encephalopathy/altered mental status, initial  concern for seizures, EEG negative x 2  Acute on chronic diastolic heart failure  Acute cardiogenic pulmonary edema  Bilateral pleural effusions  Hemoptysis  Candidemia s/p iv micafungin course completed 12/22/24  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  Atrial fibrillation on Eliquis  ESRD on home HD   Type 2 diabetes  Mild nonobstructive CAD per 5/29/2024 cath  Suspect underlying dementia with episodes of possible delirium and hyperactive delirium  Fall on 12-28  Normocytic anemia  Concern for aspiration  Hypothyroidism          Patient admitted for further evaluation and treatment  Nephrology, psychiatry and pulmonology critical care consulted thank you for your assistance  Continue supplemental oxygen titrate to keep sats greater than 90%  Continue to encourage pulmonary toilet  Continue bronchopulmonary hygiene protocol  Continue to encourage incentive spirometry  Continue aspiration precautions  Continue speech therapy  Downgrade diet to puréed with thin liquids  Continue [place] scheduled with as needed DuoNebs  Continue hemodialysis and ultrafiltration per nephrology  Continue midodrine 10 mg p.o. on dialysis days  Continue to hold Eliquis due to hemoptysis and hemothorax  Continue sliding scale insulin  Continue Seroquel 37.5 mg at night and titrate as needed  Continue physical therapy Occupational Therapy  Continue monitor anemia and transfuse as needed keep hemoglobin greater than 7  Continue levothyroxine  Further inpatient orders or recommendations pending clinical course         Discussed plan with bedside RN.    Disposition: Inpatient rehab.  Discharge planning coordinating.  Will need to be sitter free x 24 hours.    VTE Prophylaxis:  Pharmacologic & mechanical VTE prophylaxis orders are present.        CODE STATUS:   Level Of Support Discussed With: Next of Kin (If No Surrogate)  Code Status (Patient has no pulse and is not breathing): No CPR (Do Not Attempt to  Resuscitate)  Medical Interventions (Patient has pulse or is breathing): Full Support

## 2025-01-02 NOTE — PLAN OF CARE
Goal Outcome Evaluation:   Pt alert to self only. Continued with safety watch precautions. Skin care and wound care completed. Infrequent congestive cough throughout the night.

## 2025-01-02 NOTE — THERAPY TREATMENT NOTE
Acute Care - Physical Therapy Treatment Note  BRIA Sullivan     Patient Name: Nelson Wang  : 1945  MRN: [id number]  Today's Date: 2025      Visit Dx:     ICD-10-CM ICD-9-CM   1. Acute respiratory failure with hypoxia  J96.01 518.81   2. Acute pulmonary edema  J81.0 518.4   3. Difficulty walking  R26.2 719.7   4. Oropharyngeal dysphagia  R13.12 787.22     Patient Active Problem List   Diagnosis    Essential hypertension    Bradycardia, sinus    Anemia due to chronic kidney disease    Hypothyroidism    Idiopathic gout    Proteinuria    Psoriasis    Thrombocytopenia    Type 2 diabetes mellitus without complication    CKD (chronic kidney disease), stage IV    Hyperlipidemia    Monoclonal gammopathy of unknown significance (MGUS)    Stage 5 chronic kidney disease    Chronic gout without tophus    Peritoneal dialysis catheter dysfunction    Medicare annual wellness visit, subsequent    Chronic cough    Peritonitis associated with peritoneal dialysis    Recurrent pneumonia    Acute on chronic respiratory failure with hypoxia    ESRD (end stage renal disease)    Aspiration pneumonitis    Sepsis    Type 2 diabetes mellitus, with long-term current use of insulin    GERD without esophagitis    Immunosuppression due to drug therapy    Bipolar disorder    Chronic respiratory failure with hypoxia    Shortness of breath    Abnormal stress test    ESRD on dialysis    Abnormal chest CT    Encounter for screening for malignant neoplasm of colon    History of colon polyps    Family history of colon cancer    Intractable pain    Abdominal pain    ESRD (end stage renal disease) on dialysis    AMS (altered mental status)    Hallucinations    LUQ pain    Discitis    Metabolic encephalopathy    Aspiration pneumonia    Discitis of lumbar region    Severe malnutrition    Dementia    Unspecified severe protein-calorie malnutrition    Hypoxia    Gram-positive bacteremia     Past Medical History:   Diagnosis Date    Abnormal stress  test     Anemia     IRON INFUSIONS WITH DIALYSIS    Anesthesia     WITH HIP REPLACEMENT DAUGHTER BELIEVES HAD SVT 2000    Ankle pain, right     Anxiety and depression     Arthritis     CKD (chronic kidney disease), stage IV     DIALYSIS [id number] SHILEY IN RIGHT CHEST    Diabetes     GERD (gastroesophageal reflux disease)     Gout     High cholesterol     History of peritoneal dialysis     HL (hearing loss)     Hyperkalemia     Hypertension     Hypothyroidism     Insomnia     Night terrors     Psoriasis     Psoriasis     Sleep apnea     Sleep walking     Thrombocytopenia     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    Vitiligo      Past Surgical History:   Procedure Laterality Date    ABDOMINAL SURGERY      ANKLE SURGERY Right 11/1990    APPENDECTOMY N/A 1954    ARTERIOVENOUS FISTULA/SHUNT SURGERY Left 07/16/2024    Procedure: Creation of left arm arteriovenous fistula;  Surgeon: Moses Mir MD;  Location: [facility] MAIN OR;  Service: Vascular;  Laterality: Left;    BRONCHOSCOPY N/A 03/01/2024    Procedure: BRONCHOSCOPY WITH BAL AND WASHINGS;  Surgeon: Sandra Espinal MD;  Location: [facility] ENDOSCOPY;  Service: Pulmonary;  Laterality: N/A;  PNEUMONIA    BRONCHOSCOPY N/A 08/30/2024    Procedure: BRONCHOSCOPY WITH BALS AND WASHINGS;  Surgeon: Sandra Espinal MD;  Location: [facility] ENDOSCOPY;  Service: Pulmonary;  Laterality: N/A;  MUCOUS PLUGGING    CARDIAC CATHETERIZATION N/A 05/29/2024    Procedure: Left Heart Cath with possible coronary angioplasty;  Surgeon: Stephan Nichols MD;  Location: [facility] CATH INVASIVE LOCATION;  Service: Cardiology;  Laterality: N/A;    COLONOSCOPY N/A 06/2022    [facility]    ENDOSCOPY N/A 10/28/2024    Procedure: ESOPHAGOGASTRODUODENOSCOPY INSERTION OF LIGHTED INSTRUMENT TO VIEW ESOPHAGUS, STOMACH AND SMALL INTESTINE;  Surgeon: Kingsley Peraza MD;  Location: [facility] ENDOSCOPY;  Service: Gastroenterology;  Laterality: N/A;  Gastritis    FRACTURE SURGERY      HIP BIPOLAR  REPLACEMENT Right 01/2000    INSERTION HEMODIALYSIS CATHETER Left 04/09/2024    Procedure: HEMODIALYSIS CATHETER INSERTION;  Surgeon: Jose Berry MD;  Location: [facility] MAIN OR;  Service: General;  Laterality: Left;    INSERTION HEMODIALYSIS CATHETER N/A 04/12/2024    Procedure: Tunneled hemodialysis catheter insertion;  Surgeon: Enrique Vinson MD;  Location: [facility] MAIN OR;  Service: Vascular;  Laterality: N/A;    INSERTION PERITONEAL DIALYSIS CATHETER N/A 03/27/2023    Procedure: LAPAROSCOPIC INSERTION PERITONEAL DIALYSIS CATHETER, LAPAROSCOPIC OMENTOPEXY WITH LYSIS OF ADHESIONS;  Surgeon: Jose Berry MD;  Location: [facility] MAIN OR;  Service: General;  Laterality: N/A;    INSERTION PERITONEAL DIALYSIS CATHETER Left 07/23/2023    Procedure: REVISION OF PERITONEAL DIALYSIS CATHETER;  Surgeon: Radha Oreilly MD;  Location: [facility] MAIN OR;  Service: General;  Laterality: Left;    JOINT REPLACEMENT      REMOVAL PERITONEAL DIALYSIS CATHETER N/A 04/09/2024    Procedure: REMOVAL PERITONEAL DIALYSIS CATHETER;  Surgeon: Jose Berry MD;  Location: [facility] MAIN OR;  Service: General;  Laterality: N/A;    RENAL BIOPSY Left 07/15/2022    UPPER GASTROINTESTINAL ENDOSCOPY      VASCULAR SURGERY      WRIST SURGERY      UNSURE WHICH SIDE DAUGHTER REPORTS HAD SEVERE  CUT FROM WINDOW AND THEY HAD TO DO RECONSTRUCTIVE SURGERY WITH VESSELS AND NERVES     PT Assessment (Last 12 Hours)       PT Evaluation and Treatment       Row Name 01/02/25 0852          Physical Therapy Time and Intention    Subjective Information complains of;pain  -DK     Document Type therapy note (daily note)  -DK     Mode of Treatment individual therapy;physical therapy  -DK     Patient Effort good  -DK     Symptoms Noted During/After Treatment increased pain  -DK     Comment Pt was sleeping upon entry to the room.  He did indicate some discomfort initially with movement of the right leg, but this resolved within a few  repetitions.  Pt tolerated sitting EOB, but was confused, lethargic, pulling backwards at times. Standing transfers deferred this session.  Pending dialysis today.  Safety sitter in room.  -       Row Name 01/02/25 0852          Pain    Pretreatment Pain Rating 0/10 - no pain  -DK     Posttreatment Pain Rating 2/10  -DK     Pain Side/Orientation right;generalized  -DK     Pain Management Interventions exercise or physical activity utilized  -DK     Response to Pain Interventions activity participation with increased pain  -DK       Row Name 01/02/25 0852          Cognition    Affect/Mental Status (Cognition) confused;low arousal/lethargic  -DK     Orientation Status (Cognition) oriented to;person  -DK     Follows Commands (Cognition) physical/tactile prompts required;repetition of directions required;verbal cues/prompting required  -DK     Cognitive Function (Cognition) attention deficit  -DK     Attention Deficit (Cognition) minimal deficit;moderate deficit;concentration;focused/sustained attention;arousal/alertness  -DK     Personal Safety Interventions nonskid shoes/slippers when out of bed;supervised activity;gait belt  -       Row Name 01/02/25 0852          Bed Mobility    Bed Mobility scooting/bridging;supine-sit;sit-supine  -DK     All Activities, [place] (Bed Mobility) 1 person assist;moderate assist (50% patient effort);maximum assist (25% patient effort)  -DK     Rolling Left [place] (Bed Mobility) 1 person assist;moderate assist (50% patient effort);maximum assist (25% patient effort)  -DK     Rolling Right [place] (Bed Mobility) 1 person assist;moderate assist (50% patient effort);maximum assist (25% patient effort)  -DK     Scooting/Bridging [place] (Bed Mobility) 2 person assist;moderate assist (50% patient effort);maximum assist (25% patient effort)  -DK     Supine-Sit [place] (Bed Mobility) 1 person assist;moderate assist (50% patient effort);maximum assist (25% patient  effort)  -DK     Sit-Supine [place] (Bed Mobility) 1 person assist;moderate assist (50% patient effort);maximum assist (25% patient effort)  -DK     Bed Mobility, Safety Issues cognitive deficits limit understanding;decreased use of arms for pushing/pulling;decreased use of legs for bridging/pushing  -DK     Assistive Device (Bed Mobility) repositioning sheet  -DK     Comment, (Bed Mobility) Pt tolerated sitting EOB x 3-4 minutes with HO/min assist x 1.  He would pull backwards occasionally, requiring up to mod assist.  Pt was returned to bed on alert post treatment.  -DK       Row Name 01/02/25 0852          Transfers    Transfers sit-stand transfer;stand-sit transfer  -DK       Row Name 01/02/25 0852          Sit-Stand Transfer    Sit-Stand [place] (Transfers) minimum assist (75% patient effort);1 person assist  -DK     Assistive Device (Sit-Stand Transfers) walker, front-wheeled  -DK       Row Name 01/02/25 0852          Stand-Sit Transfer    Stand-Sit [place] (Transfers) minimum assist (75% patient effort);1 person assist  -DK     Assistive Device (Stand-Sit Transfers) walker, front-wheeled  -DK       Row Name 01/02/25 0852          Gait/Stairs (Locomotion)    Gait/Stairs Locomotion gait/ambulation independence;gait/ambulation assistive device;distance ambulated;gait pattern  -DK     [place] Level (Gait) 1 person assist;contact guard;minimum assist (75% patient effort)  -DK     Assistive Device (Gait) walker, front-wheeled  -DK     Pattern (Gait) step-to  -DK     Deviations/Abnormal Patterns (Gait) marguerite decreased;festinating/shuffling;gait speed decreased;stride length decreased  -DK     Bilateral Gait Deviations forward flexed posture  -DK       Row Name 01/02/25 0852          Safety Issues/Impairments Affecting Functional Mobility    Safety Issues Affecting Function (Mobility) awareness of need for assistance;ability to follow commands;impulsivity;judgment;safety precaution awareness   -     Impairments Affecting Function (Mobility) balance;cognition;endurance/activity tolerance;pain;strength  -     Cognitive Impairments, Mobility Safety/Performance attention;awareness, need for assistance;impulsivity;judgment;safety precaution awareness  -       Row Name 01/02/25 0852          Balance    Balance Assessment sitting static balance;sitting dynamic balance  -     Static Sitting Balance contact guard;minimal assist;1-person assist  -     Dynamic Sitting Balance minimal assist;moderate assist;1-person assist  -     Position, Sitting Balance supported;sitting edge of bed  -     Balance Interventions sitting;static;dynamic;supported  -       Row Name 01/02/25 0852          Motor Skills    Motor Skills --  therapeutic exercises  -     Therapeutic Exercise hip;knee;ankle  -       Row Name 01/02/25 0852          Hip (Therapeutic Exercise)    Hip (Therapeutic Exercise) AAROM (active assistive range of motion)  -     Hip AAROM (Therapeutic Exercise) bilateral;flexion;extension;aBduction;aDduction;supine;10 repetitions;2 sets  -       Row Name 01/02/25 0852          Knee (Therapeutic Exercise)    Knee (Therapeutic Exercise) AAROM (active assistive range of motion)  -     Knee AAROM (Therapeutic Exercise) bilateral;flexion;extension;supine;10 repetitions;2 sets  -       Row Name 01/02/25 0852          Ankle (Therapeutic Exercise)    Ankle (Therapeutic Exercise) AAROM (active assistive range of motion)  -     Ankle AAROM (Therapeutic Exercise) bilateral;dorsiflexion;plantarflexion;supine;10 repetitions;2 sets  -       Row Name             Wound 11/15/24 1945 Right upper chest    Wound - Properties Group Placement Date: 11/15/24  -JR Placement Time: 1945  -JR Side: Right  -JR Orientation: upper  - Location: chest  -JR Primary Wound Type: Incision  -JR    Retired Wound - Properties Group Placement Date: 11/15/24  -JR Placement Time: 1945  -JR Side: Right  -JR Orientation: upper   -JR Location: chest  -JR Primary Wound Type: Incision  -JR    Retired Wound - Properties Group Placement Date: 11/15/24  -JR Placement Time: 1945  -JR Side: Right  -JR Orientation: upper  -JR Location: chest  -JR Primary Wound Type: Incision  -JR    Retired Wound - Properties Group Date first assessed: 11/15/24  -JR Time first assessed: 1945  -JR Side: Right  -JR Location: chest  -JR Primary Wound Type: Incision  -JR      Row Name             Wound 12/05/24 2331 Right lower leg abrasion    Wound - Properties Group Placement Date: 12/05/24  -AW Placement Time: 2331  -AW Side: Right  -AW Orientation: lower  -AW Location: leg  -AW Primary Wound Type: Abrasion  -AW Type: abrasion  -AW Present on Original Admission: Y  -AW    Retired Wound - Properties Group Placement Date: 12/05/24  -AW Placement Time: 2331 -AW Present on Original Admission: Y  -AW Side: Right  -AW Orientation: lower  -AW Location: leg  -AW Primary Wound Type: Abrasion  -AW Type: abrasion  -AW    Retired Wound - Properties Group Placement Date: 12/05/24  -AW Placement Time: 2331 -AW Present on Original Admission: Y  -AW Side: Right  -AW Orientation: lower  -AW Location: leg  -AW Primary Wound Type: Abrasion  -AW Type: abrasion  -AW    Retired Wound - Properties Group Date first assessed: 12/05/24  -AW Time first assessed: 2331  -AW Present on Original Admission: Y  -AW Side: Right  -AW Location: leg  -AW Primary Wound Type: Abrasion  -AW Type: abrasion  -AW      Row Name             Wound 12/06/24 1440 Left lower leg skin tear    Wound - Properties Group Placement Date: 12/06/24  -KE Placement Time: 1440  -KE Side: Left  -KE Orientation: lower  -KE Location: leg  -KE Primary Wound Type: Skin tear  -KE Type: skin tear  -KE    Retired Wound - Properties Group Placement Date: 12/06/24  -KE Placement Time: 1440  -KE Side: Left  -KE Orientation: lower  -KE Location: leg  -KE Primary Wound Type: Skin tear  -KE Type: skin tear  -KE    Retired Wound -  Properties Group Placement Date: 12/06/24  -KE Placement Time: 1440  -KE Side: Left  -KE Orientation: lower  -KE Location: leg  -KE Primary Wound Type: Skin tear  -KE Type: skin tear  -KE    Retired Wound - Properties Group Date first assessed: 12/06/24  -KE Time first assessed: 1440  -KE Side: Left  -KE Location: leg  -KE Primary Wound Type: Skin tear  -KE Type: skin tear  -KE      Row Name             Wound 12/28/24 2100 Right posterior elbow skin tear    Wound - Properties Group Placement Date: 12/28/24  -KM Placement Time: 2100  -KM Side: Right  -KM Orientation: posterior  -KM Location: elbow  -KM Primary Wound Type: Skin tear  -KM Type: skin tear  -KM    Retired Wound - Properties Group Placement Date: 12/28/24  -KM Placement Time: 2100  -KM Side: Right  -KM Orientation: posterior  -KM Location: elbow  -KM Primary Wound Type: Skin tear  -KM Type: skin tear  -KM    Retired Wound - Properties Group Placement Date: 12/28/24  -KM Placement Time: 2100  -KM Side: Right  -KM Orientation: posterior  -KM Location: elbow  -KM Primary Wound Type: Skin tear  -KM Type: skin tear  -KM    Retired Wound - Properties Group Date first assessed: 12/28/24  -KM Time first assessed: 2100  -KM Side: Right  -KM Location: elbow  -KM Primary Wound Type: Skin tear  -KM Type: skin tear  -KM      Row Name 01/02/25 0852          Plan of Care Review    Plan of Care Reviewed With patient  -DK     Progress no change  -DK       Row Name 01/02/25 0852          Positioning and Restraints    Pre-Treatment Position in bed  -DK     Post Treatment Position bed  -DK     In Bed supine;call light within reach;encouraged to call for assist;exit alarm on;with family/caregiver;side rails up x3;legs elevated;heels elevated;RUE elevated;LUE elevated  -DK               User Key  (r) = Recorded By, (t) = Taken By, (c) = Cosigned By      Initials Name Provider Type    Stan Esposito, RN Registered Nurse    Jackelin Freitas, RN Registered Nurse    MAXIM Corral  Neena, RN Registered Nurse    Farzana Ma RN Registered Nurse    Lia Canales, GREER Physical Therapist Assistant                    Physical Therapy Education       Title: PT OT SLP Therapies (In Progress)       Topic: Physical Therapy (In Progress)       Point: Mobility training (Done)       Learning Progress Summary            Patient Acceptance, E,TB, VU by AV at 12/17/2024 1454                      Point: Home exercise program (Not Started)       Learner Progress:  Not documented in this visit.              Point: Body mechanics (Done)       Learning Progress Summary            Patient Acceptance, E,TB, VU by AV at 12/17/2024 1454                      Point: Precautions (Done)       Learning Progress Summary            Patient Acceptance, E,TB, VU by AV at 12/17/2024 1454                                      User Key       Initials Effective Dates Name Provider Type Discipline     06/11/21 -  Kaushik Chavez, PT Physical Therapist PT                  PT Recommendation and Plan  Planned Therapy Interventions (PT): balance training, bed mobility training, gait training, strengthening, transfer training  Therapy Frequency (PT): daily  Progress Summary (PT)  Progress Toward Functional Goals (PT): progress toward functional goals is good  Plan of Care Reviewed With: patient  Progress: no change   Outcome Measures       Row Name 01/02/25 0852             How much help from another person do you currently need...    Turning from your back to your side while in flat bed without using bedrails? 3  -DK      Moving from lying on back to sitting on the side of a flat bed without bedrails? 2  -DK      Moving to and from a bed to a chair (including a wheelchair)? 2  -DK      Standing up from a chair using your arms (e.g., wheelchair, bedside chair)? 2  -DK      Climbing 3-5 steps with a railing? 1  -DK      To walk in hospital room? 2  -DK      AM-PAC 6 Clicks Score (PT) 12  -DK         Functional Assessment     Outcome Measure Options AM-PAC 6 Clicks Basic Mobility (PT)  -DK                User Key  (r) = Recorded By, (t) = Taken By, (c) = Cosigned By      Initials Name Provider Type    Lia Canales PTA Physical Therapist Assistant                     Time Calculation:    PT Charges       Row Name 01/02/25 0858             Time Calculation    PT Received On 01/02/25  -DK      PT Goal Re-Cert Due Date 01/05/25  -DK         Timed Charges    84552 - PT Therapeutic Exercise Minutes 14  -DK      39307 - PT Therapeutic Activity Minutes 12  -DK         Total Minutes    Timed Charges Total Minutes 26  -DK       Total Minutes 26  -DK                User Key  (r) = Recorded By, (t) = Taken By, (c) = Cosigned By      Initials Name Provider Type    Lia Canales PTA Physical Therapist Assistant                  Therapy Charges for Today       Code Description Service Date Service Provider Modifiers Qty    66209714697 HC PT THER PROC EA 15 MIN 1/2/2025 Lia Escamilla, GREER GP 1    86081751052 HC PT THERAPEUTIC ACT EA 15 MIN 1/2/2025 Lia Escamilla PTA GP 1            PT G-Codes  Outcome Measure Options: AM-PAC 6 Clicks Basic Mobility (PT)  AM-PAC 6 Clicks Score (PT): 12  AM-PAC 6 Clicks Score (OT): 7    Lia Escamilla PTA  1/2/2025

## 2025-01-03 LAB
ALBUMIN SERPL-MCNC: 2.5 G/DL (ref 3.5–5.2)
ANION GAP SERPL CALCULATED.3IONS-SCNC: 12.7 MMOL/L (ref 5–15)
BUN SERPL-MCNC: 25 MG/DL (ref 8–23)
BUN/CREAT SERPL: 7.5 (ref 7–25)
CALCIUM SPEC-SCNC: 9.4 MG/DL (ref 8.6–10.5)
CHLORIDE SERPL-SCNC: 97 MMOL/L (ref 98–107)
CO2 SERPL-SCNC: 22.3 MMOL/L (ref 22–29)
CREAT SERPL-MCNC: 3.34 MG/DL (ref 0.76–1.27)
DEPRECATED RDW RBC AUTO: 69 FL (ref 37–54)
EGFRCR SERPLBLD CKD-EPI 2021: 18.1 ML/MIN/1.73
ERYTHROCYTE [DISTWIDTH] IN BLOOD BY AUTOMATED COUNT: 20 % (ref 12.3–15.4)
FUNGUS WND CULT: NORMAL
GLUCOSE BLDC GLUCOMTR-MCNC: 156 MG/DL (ref 70–99)
GLUCOSE BLDC GLUCOMTR-MCNC: 166 MG/DL (ref 70–99)
GLUCOSE SERPL-MCNC: 140 MG/DL (ref 65–99)
HBV SURFACE AG SERPL QL IA: NORMAL
HCT VFR BLD AUTO: 26.9 % (ref 37.5–51)
HGB BLD-MCNC: 7.5 G/DL (ref 13–17.7)
MCH RBC QN AUTO: 26 PG (ref 26.6–33)
MCHC RBC AUTO-ENTMCNC: 27.9 G/DL (ref 31.5–35.7)
MCV RBC AUTO: 93.4 FL (ref 79–97)
NT-PROBNP SERPL-MCNC: ABNORMAL PG/ML (ref 0–1800)
PHOSPHATE SERPL-MCNC: 2.5 MG/DL (ref 2.5–4.5)
PLATELET # BLD AUTO: 179 10*3/MM3 (ref 140–450)
PMV BLD AUTO: 10.3 FL (ref 6–12)
POTASSIUM SERPL-SCNC: 4.8 MMOL/L (ref 3.5–5.2)
RBC # BLD AUTO: 2.88 10*6/MM3 (ref 4.14–5.8)
SODIUM SERPL-SCNC: 132 MMOL/L (ref 136–145)
WBC NRBC COR # BLD AUTO: 5.18 10*3/MM3 (ref 3.4–10.8)

## 2025-01-03 PROCEDURE — 97162 PT EVAL MOD COMPLEX 30 MIN: CPT | Performed by: PHYSICAL THERAPIST

## 2025-01-03 PROCEDURE — 97164 PT RE-EVAL EST PLAN CARE: CPT | Performed by: PHYSICAL THERAPIST

## 2025-01-03 PROCEDURE — 83880 ASSAY OF NATRIURETIC PEPTIDE: CPT | Performed by: FAMILY MEDICINE

## 2025-01-03 PROCEDURE — 99233 SBSQ HOSP IP/OBS HIGH 50: CPT | Performed by: FAMILY MEDICINE

## 2025-01-03 PROCEDURE — 94664 DEMO&/EVAL PT USE INHALER: CPT

## 2025-01-03 PROCEDURE — 85027 COMPLETE CBC AUTOMATED: CPT | Performed by: FAMILY MEDICINE

## 2025-01-03 PROCEDURE — 94761 N-INVAS EAR/PLS OXIMETRY MLT: CPT

## 2025-01-03 PROCEDURE — 94799 UNLISTED PULMONARY SVC/PX: CPT

## 2025-01-03 PROCEDURE — 92526 ORAL FUNCTION THERAPY: CPT

## 2025-01-03 PROCEDURE — 94667 MNPJ CHEST WALL 1ST: CPT

## 2025-01-03 PROCEDURE — 80069 RENAL FUNCTION PANEL: CPT | Performed by: FAMILY MEDICINE

## 2025-01-03 PROCEDURE — 87340 HEPATITIS B SURFACE AG IA: CPT | Performed by: INTERNAL MEDICINE

## 2025-01-03 PROCEDURE — 82948 REAGENT STRIP/BLOOD GLUCOSE: CPT | Performed by: INTERNAL MEDICINE

## 2025-01-03 PROCEDURE — 94668 MNPJ CHEST WALL SBSQ: CPT

## 2025-01-03 PROCEDURE — 25010000002 CEFTRIAXONE PER 250 MG: Performed by: STUDENT IN AN ORGANIZED HEALTH CARE EDUCATION/TRAINING PROGRAM

## 2025-01-03 PROCEDURE — 63710000001 INSULIN LISPRO (HUMAN) PER 5 UNITS: Performed by: INTERNAL MEDICINE

## 2025-01-03 RX ORDER — GUAIFENESIN 200 MG/10ML
100 LIQUID ORAL EVERY 4 HOURS PRN
Status: DISCONTINUED | OUTPATIENT
Start: 2025-01-03 | End: 2025-01-08 | Stop reason: HOSPADM

## 2025-01-03 RX ORDER — ALBUTEROL SULFATE 0.83 MG/ML
2.5 SOLUTION RESPIRATORY (INHALATION)
Status: DISCONTINUED | OUTPATIENT
Start: 2025-01-03 | End: 2025-01-08 | Stop reason: HOSPADM

## 2025-01-03 RX ORDER — OXYCODONE HYDROCHLORIDE 5 MG/1
2.5 TABLET ORAL ONCE
Status: COMPLETED | OUTPATIENT
Start: 2025-01-03 | End: 2025-01-03

## 2025-01-03 RX ORDER — IPRATROPIUM BROMIDE AND ALBUTEROL SULFATE 2.5; .5 MG/3ML; MG/3ML
3 SOLUTION RESPIRATORY (INHALATION)
Status: DISCONTINUED | OUTPATIENT
Start: 2025-01-03 | End: 2025-01-07

## 2025-01-03 RX ADMIN — SODIUM CHLORIDE 1000 MG: 9 INJECTION INTRAMUSCULAR; INTRAVENOUS; SUBCUTANEOUS at 20:11

## 2025-01-03 RX ADMIN — LEVOTHYROXINE SODIUM 175 MCG: 0.03 TABLET ORAL at 04:36

## 2025-01-03 RX ADMIN — BUDESONIDE 0.5 MG: 0.5 INHALANT ORAL at 18:23

## 2025-01-03 RX ADMIN — Medication 2.5 MG: at 20:10

## 2025-01-03 RX ADMIN — IPRATROPIUM BROMIDE AND ALBUTEROL SULFATE 3 ML: 2.5; .5 SOLUTION RESPIRATORY (INHALATION) at 18:23

## 2025-01-03 RX ADMIN — FAMOTIDINE 10 MG: 20 TABLET, FILM COATED ORAL at 08:12

## 2025-01-03 RX ADMIN — ACETAMINOPHEN 650 MG: 325 TABLET ORAL at 04:36

## 2025-01-03 RX ADMIN — GUAIFENESIN 100 MG: 100 LIQUID ORAL at 04:36

## 2025-01-03 RX ADMIN — QUETIAPINE FUMARATE 37.5 MG: 25 TABLET ORAL at 20:10

## 2025-01-03 RX ADMIN — GUAIFENESIN 100 MG: 100 LIQUID ORAL at 00:49

## 2025-01-03 RX ADMIN — IPRATROPIUM BROMIDE AND ALBUTEROL SULFATE 3 ML: 2.5; .5 SOLUTION RESPIRATORY (INHALATION) at 13:30

## 2025-01-03 RX ADMIN — WHITE PETROLATUM 1 APPLICATION: 1.75 OINTMENT TOPICAL at 15:52

## 2025-01-03 RX ADMIN — BUDESONIDE 0.5 MG: 0.5 INHALANT ORAL at 07:00

## 2025-01-03 RX ADMIN — Medication 10 ML: at 20:10

## 2025-01-03 RX ADMIN — INSULIN LISPRO 2 UNITS: 100 INJECTION, SOLUTION INTRAVENOUS; SUBCUTANEOUS at 17:33

## 2025-01-03 RX ADMIN — OXYCODONE 2.5 MG: 5 TABLET ORAL at 06:14

## 2025-01-03 RX ADMIN — Medication 10 ML: at 08:12

## 2025-01-03 RX ADMIN — INSULIN LISPRO 2 UNITS: 100 INJECTION, SOLUTION INTRAVENOUS; SUBCUTANEOUS at 08:12

## 2025-01-03 RX ADMIN — IPRATROPIUM BROMIDE AND ALBUTEROL SULFATE 3 ML: .5; 3 SOLUTION RESPIRATORY (INHALATION) at 00:46

## 2025-01-03 RX ADMIN — ARFORMOTEROL TARTRATE 15 MCG: 15 SOLUTION RESPIRATORY (INHALATION) at 18:22

## 2025-01-03 RX ADMIN — ARFORMOTEROL TARTRATE 15 MCG: 15 SOLUTION RESPIRATORY (INHALATION) at 07:00

## 2025-01-03 NOTE — PLAN OF CARE
Goal Outcome Evaluation:  Plan of Care Reviewed With: patient, child        Progress: no change  Outcome Evaluation: Pt is alert to self and was restless for most of the night, administered seroquel and clustered care to promote sleep and rest. Pt spiked a fever of 101.7 at the beginning of shift, was agitated/restless and tachycardia, notified MD and administered tylenol and applied ice packs, MD ordered IV rochephin and azithromycin, CT chest, blood cultures and repiratory panel concerning worsening infection and poss. sepsis. Blood culture results pending at this time and CT chest results are pending. Sitter was d/c'd after midnight d/t daughter staying at the bedside and falls precaution maintained. No new issues or new needs noted at this time.

## 2025-01-03 NOTE — THERAPY RE-EVALUATION
Acute Care - Physical Therapy Re-Evaluation  BRIA Sullivan     Patient Name: Nelson Wang  : 1945  MRN: [id number]  Today's Date: 1/3/2025      Visit Dx:     ICD-10-CM ICD-9-CM   1. Acute respiratory failure with hypoxia  J96.01 518.81   2. Acute pulmonary edema  J81.0 518.4   3. Difficulty walking  R26.2 719.7   4. Oropharyngeal dysphagia  R13.12 787.22     Patient Active Problem List   Diagnosis    Essential hypertension    Bradycardia, sinus    Anemia due to chronic kidney disease    Hypothyroidism    Idiopathic gout    Proteinuria    Psoriasis    Thrombocytopenia    Type 2 diabetes mellitus without complication    CKD (chronic kidney disease), stage IV    Hyperlipidemia    Monoclonal gammopathy of unknown significance (MGUS)    Stage 5 chronic kidney disease    Chronic gout without tophus    Peritoneal dialysis catheter dysfunction    Medicare annual wellness visit, subsequent    Chronic cough    Peritonitis associated with peritoneal dialysis    Recurrent pneumonia    Acute on chronic respiratory failure with hypoxia    ESRD (end stage renal disease)    Aspiration pneumonitis    Sepsis    Type 2 diabetes mellitus, with long-term current use of insulin    GERD without esophagitis    Immunosuppression due to drug therapy    Bipolar disorder    Chronic respiratory failure with hypoxia    Shortness of breath    Abnormal stress test    ESRD on dialysis    Abnormal chest CT    Encounter for screening for malignant neoplasm of colon    History of colon polyps    Family history of colon cancer    Intractable pain    Abdominal pain    ESRD (end stage renal disease) on dialysis    AMS (altered mental status)    Hallucinations    LUQ pain    Discitis    Metabolic encephalopathy    Aspiration pneumonia    Discitis of lumbar region    Severe malnutrition    Dementia    Unspecified severe protein-calorie malnutrition    Hypoxia    Gram-positive bacteremia     Past Medical History:   Diagnosis Date    Abnormal stress  test     Anemia     IRON INFUSIONS WITH DIALYSIS    Anesthesia     WITH HIP REPLACEMENT DAUGHTER BELIEVES HAD SVT 2000    Ankle pain, right     Anxiety and depression     Arthritis     CKD (chronic kidney disease), stage IV     DIALYSIS [id number] SHILEY IN RIGHT CHEST    Diabetes     GERD (gastroesophageal reflux disease)     Gout     High cholesterol     History of peritoneal dialysis     HL (hearing loss)     Hyperkalemia     Hypertension     Hypothyroidism     Insomnia     Night terrors     Psoriasis     Psoriasis     Sleep apnea     Sleep walking     Thrombocytopenia     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    Vitiligo      Past Surgical History:   Procedure Laterality Date    ABDOMINAL SURGERY      ANKLE SURGERY Right 11/1990    APPENDECTOMY N/A 1954    ARTERIOVENOUS FISTULA/SHUNT SURGERY Left 07/16/2024    Procedure: Creation of left arm arteriovenous fistula;  Surgeon: Moses Mir MD;  Location: [facility] MAIN OR;  Service: Vascular;  Laterality: Left;    BRONCHOSCOPY N/A 03/01/2024    Procedure: BRONCHOSCOPY WITH BAL AND WASHINGS;  Surgeon: Sandra Espinal MD;  Location: [facility] ENDOSCOPY;  Service: Pulmonary;  Laterality: N/A;  PNEUMONIA    BRONCHOSCOPY N/A 08/30/2024    Procedure: BRONCHOSCOPY WITH BALS AND WASHINGS;  Surgeon: Sandra Espinal MD;  Location: [facility] ENDOSCOPY;  Service: Pulmonary;  Laterality: N/A;  MUCOUS PLUGGING    CARDIAC CATHETERIZATION N/A 05/29/2024    Procedure: Left Heart Cath with possible coronary angioplasty;  Surgeon: Stephan Nichols MD;  Location: [facility] CATH INVASIVE LOCATION;  Service: Cardiology;  Laterality: N/A;    COLONOSCOPY N/A 06/2022    [facility]    ENDOSCOPY N/A 10/28/2024    Procedure: ESOPHAGOGASTRODUODENOSCOPY INSERTION OF LIGHTED INSTRUMENT TO VIEW ESOPHAGUS, STOMACH AND SMALL INTESTINE;  Surgeon: Kingsley Peraza MD;  Location: [facility] ENDOSCOPY;  Service: Gastroenterology;  Laterality: N/A;  Gastritis    FRACTURE SURGERY      HIP BIPOLAR  REPLACEMENT Right 01/2000    INSERTION HEMODIALYSIS CATHETER Left 04/09/2024    Procedure: HEMODIALYSIS CATHETER INSERTION;  Surgeon: Jose Berry MD;  Location: [facility] MAIN OR;  Service: General;  Laterality: Left;    INSERTION HEMODIALYSIS CATHETER N/A 04/12/2024    Procedure: Tunneled hemodialysis catheter insertion;  Surgeon: Enrique Vinson MD;  Location[facility] MA[facility] OR;  Service: Vascular;  Laterality: N/A;    INSERTION PERITONEAL DIALYSIS CATHETER N/A 03/27/2023    Procedure: LAPAROSCOPIC INSERTION PERITONEAL DIALYSIS CATHETER, LAPAROSCOPIC OMENTOPEXY WITH LYSIS OF ADHESIONS;  Surgeon: Jose Berry MD;  Location[facility];  Service: General;  Laterality: N/A;    INSERTION PERITONEAL DIALYSIS CATHETER Left 07/23/2023    Procedure: REVISION OF PERITONEAL DIALYSIS CATHETER;  Surgeon: Radha Oreilly MD;  Location[facility] MA[facility] OR;  Service: General;  Laterality: Left;    JOINT REPLACEMENT      REMOVAL PERITONEAL DIALYSIS CATHETER N/A 04/09/2024    Procedure: REMOVAL PERITONEAL DIALYSIS CATHETER;  Surgeon: Jose Berry MD;  Location: [facility] MA[facility] OR;  Service: General;  Laterality: N/A;    RENAL BIOPSY Left 07/15/2022    UPPER GASTROINTESTINAL ENDOSCOPY      VASCULAR SURGERY      WRIST SURGERY      UNSURE WHICH SIDE DAUGHTER REPORTS HAD SEVERE  CUT FROM WINDOW AND THEY HAD TO DO RECONSTRUCTIVE SURGERY WITH VESSELS AND NERVES     PT Assessment (Last 12 Hours)       PT Evaluation and Treatment       Row Name 01/03/25 1400          Physical Therapy Time and Intention    Document Type re-evaluation  -TC     Mode of Treatment individual therapy;physical therapy  -TC     Patient Effort adequate  -TC     Symptoms Noted During/After Treatment other (see comments)  confusion  -TC       Row Name 01/03/25 1400          General Information    Patient Profile Reviewed yes  -TC     Equipment Currently Used at Home walker, rolling  -TC     Existing Precautions/Restrictions  fall;oxygen therapy device and L/min  -TC     Barriers to Rehab cognitive status  -TC       Row Name 01/03/25 1400          Living Environment    Current Living Arrangements home  -TC     People in Home child(laura), adult;spouse  -TC       Row Name 01/03/25 1400          Home Use of Assistive/Adaptive Equipment    Equipment Currently Used at Home bath bench;bp cuff;wheelchair  -TC       Row Name 01/03/25 1400          Pain    Pretreatment Pain Rating 0/10 - no pain  -TC     Posttreatment Pain Rating 0/10 - no pain  -TC       Row Name 01/03/25 1400          Cognition    Affect/Mental Status (Cognition) confused  -       Row Name 01/03/25 1400          Range of Motion (ROM)    Range of Motion bilateral lower extremities;ROM is WFL  -       Row Name 01/03/25 1400          Strength Comprehensive (MMT)    General Manual Muscle Testing (MMT) Assessment lower extremity strength deficits identified  -TC     Comment, General Manual Muscle Testing (MMT) Assessment 3/5 B LE  -TC       Row Name 01/03/25 1400          Bed Mobility    Bed Mobility rolling left;rolling right;supine-sit;sit-supine;scooting/bridging  -TC     Rolling Left [place] (Bed Mobility) moderate assist (50% patient effort);1 person assist  -TC     Rolling Right [place] (Bed Mobility) moderate assist (50% patient effort);1 person assist  -TC     Scooting/Bridging [place] (Bed Mobility) maximum assist (25% patient effort);2 person assist  -TC     Supine-Sit [place] (Bed Mobility) moderate assist (50% patient effort);1 person assist  -TC     Sit-Supine [place] (Bed Mobility) moderate assist (50% patient effort);1 person assist  -TC     Bed Mobility, Safety Issues cognitive deficits limit understanding;decreased use of arms for pushing/pulling;decreased use of legs for bridging/pushing  -TC     Assistive Device (Bed Mobility) bed rails  -TC       Row Name 01/03/25 1400          Transfers    Comment, (Transfers) Attempted STS yet pt  unable to perform. Inc posterior pushing. Pt just returned to bed from standing with nursing.  -       Row Name 01/03/25 1400          Bed-Chair Transfer    Comment, (Bed-Chair Transfer) Not performed. Pt just t/f back to bed from w/c with nsg.  -       Row Name 01/03/25 1400          Gait/Stairs (Locomotion)    Patient was able to Ambulate no, other medical factors prevent ambulation  -TC     Reason Patient was unable to Ambulate Excessive Weakness  -       Row Name 01/03/25 1400          Safety Issues/Impairments Affecting Functional Mobility    Safety Issues Affecting Function (Mobility) ability to follow commands;awareness of need for assistance  -     Impairments Affecting Function (Mobility) balance;cognition  -TC     Cognitive Impairments, Mobility Safety/Performance attention;problem-solving/reasoning  -       Row Name 01/03/25 1400          Balance    Balance Assessment sitting static balance;sitting dynamic balance  -TC     Static Sitting Balance minimal assist;1-person assist  -TC     Dynamic Sitting Balance moderate assist;1-person assist  -TC     Position, Sitting Balance supported  -TC     Comment, Balance Intermittent posterior leaning with VC and TC needed for sitting at midline. When encouraged to stand, inc posterior pushing present as well.  -       Row Name 01/03/25 1400          Motor Skills    Therapeutic Exercise other (see comments)  Sitting EOB B LE TE x 5 reps AAROM knee extension. Pt unable to perform any other exercises this pm despite maximal attempts.  -       Row Name             Wound 11/15/24 1945 Right upper chest    Wound - Properties Group Placement Date: 11/15/24  -JR Placement Time: 1945  -JR Side: Right  -JR Orientation: upper  -JR Location: chest  -JR Primary Wound Type: Incision  -JR    Retired Wound - Properties Group Placement Date: 11/15/24  -JR Placement Time: 1945  -JR Side: Right  -JR Orientation: upper  -JR Location: chest  -JR Primary Wound Type:  Incision  -JR    Retired Wound - Properties Group Placement Date: 11/15/24  -JR Placement Time: 1945 -JR Side: Right  -JR Orientation: upper  -JR Location: chest  -JR Primary Wound Type: Incision  -JR    Retired Wound - Properties Group Date first assessed: 11/15/24  -JR Time first assessed: 1945 -JR Side: Right  -JR Location: chest  -JR Primary Wound Type: Incision  -JR      Row Name             Wound 12/05/24 2331 Right lower leg abrasion    Wound - Properties Group Placement Date: 12/05/24  -AW Placement Time: 2331  -AW Side: Right  -AW Orientation: lower  -AW Location: leg  -AW Primary Wound Type: Abrasion  -AW Type: abrasion  -AW Present on Original Admission: Y  -AW    Retired Wound - Properties Group Placement Date: 12/05/24  -AW Placement Time: 2331 -AW Present on Original Admission: Y  -AW Side: Right  -AW Orientation: lower  -AW Location: leg  -AW Primary Wound Type: Abrasion  -AW Type: abrasion  -AW    Retired Wound - Properties Group Placement Date: 12/05/24  -AW Placement Time: 2331 -AW Present on Original Admission: Y  -AW Side: Right  -AW Orientation: lower  -AW Location: leg  -AW Primary Wound Type: Abrasion  -AW Type: abrasion  -AW    Retired Wound - Properties Group Date first assessed: 12/05/24  -AW Time first assessed: 2331  -AW Present on Original Admission: Y  -AW Side: Right  -AW Location: leg  -AW Primary Wound Type: Abrasion  -AW Type: abrasion  -AW      Row Name             Wound 12/06/24 1440 Left lower leg skin tear    Wound - Properties Group Placement Date: 12/06/24  -KE Placement Time: 1440  -KE Side: Left  -KE Orientation: lower  -KE Location: leg  -KE Primary Wound Type: Skin tear  -KE Type: skin tear  -KE    Retired Wound - Properties Group Placement Date: 12/06/24  -KE Placement Time: 1440  -KE Side: Left  -KE Orientation: lower  -KE Location: leg  -KE Primary Wound Type: Skin tear  -KE Type: skin tear  -KE    Retired Wound - Properties Group Placement Date: 12/06/24  -KE  Placement Time: 1440  -KE Side: Left  -KE Orientation: lower  -KE Location: leg  -KE Primary Wound Type: Skin tear  -KE Type: skin tear  -KE    Retired Wound - Properties Group Date first assessed: 12/06/24  -KE Time first assessed: 1440  -KE Side: Left  -KE Location: leg  -KE Primary Wound Type: Skin tear  -KE Type: skin tear  -KE      Row Name             Wound 12/28/24 2100 Right posterior elbow skin tear    Wound - Properties Group Placement Date: 12/28/24  -KM Placement Time: 2100  -KM Side: Right  -KM Orientation: posterior  -KM Location: elbow  -KM Primary Wound Type: Skin tear  -KM Type: skin tear  -KM    Retired Wound - Properties Group Placement Date: 12/28/24  -KM Placement Time: 2100  -KM Side: Right  -KM Orientation: posterior  -KM Location: elbow  -KM Primary Wound Type: Skin tear  -KM Type: skin tear  -KM    Retired Wound - Properties Group Placement Date: 12/28/24  -KM Placement Time: 2100  -KM Side: Right  -KM Orientation: posterior  -KM Location: elbow  -KM Primary Wound Type: Skin tear  -KM Type: skin tear  -KM    Retired Wound - Properties Group Date first assessed: 12/28/24  -KM Time first assessed: 2100  -KM Side: Right  -KM Location: elbow  -KM Primary Wound Type: Skin tear  -KM Type: skin tear  -KM      Row Name 01/03/25 1400          Plan of Care Review    Plan of Care Reviewed With daughter  -TC     Outcome Evaluation Pt continues to demonstrate increased muscle weakness globally, decreased balance and decreased coordination limiting his ability to perform bed mobility and transfers without increased assist. Pt was able to get up with nursing today and get in wheelchair. PT will continue to see pt and work toward meeting goals.  -TC       Row Name 01/03/25 1400          Positioning and Restraints    Pre-Treatment Position in bed  -TC     Post Treatment Position bed  -TC     In Bed supine;with family/caregiver  -TC       Row Name 01/03/25 1400          Therapy Assessment/Plan (PT)    Rehab  Potential (PT) good  -TC     Criteria for Skilled Interventions Met (PT) yes;skilled treatment is necessary  -TC     Therapy Frequency (PT) daily  -TC     Predicted Duration of Therapy Intervention (PT) 10 days  -TC     Problem List (PT) problems related to;balance;cognition;coordination;mobility;strength  -TC     Activity Limitations Related to Problem List (PT) unable to ambulate safely;unable to transfer safely  -TC       Row Name 01/03/25 1400          PT Evaluation Complexity    History, PT Evaluation Complexity 1-2 personal factors and/or comorbidities  -TC     Examination of Body Systems (PT Eval Complexity) total of 3 or more elements  -TC     Clinical Presentation (PT Evaluation Complexity) stable  -TC     Clinical Decision Making (PT Evaluation Complexity) moderate complexity  -TC     Overall Complexity (PT Evaluation Complexity) low complexity  -TC       Row Name 01/03/25 1400          Progress Summary (PT)    Progress Toward Functional Goals (PT) progress toward functional goals is fair  -TC     Daily Progress Summary (PT) Pt continues to present with decreased global muscle strength, decreased balance and stability limiting his ability to perform activities without increased external assist. PT to continue.  -TC       Row Name 01/03/25 1400          Therapy Plan Review/Discharge Plan (PT)    Therapy Plan Review (PT) evaluation/treatment results reviewed  -TC       Row Name 01/03/25 1400          Physical Therapy Goals    Bed Mobility Goal Selection (PT) bed mobility, PT goal 1  -TC     Transfer Goal Selection (PT) transfer, PT goal 1  -TC     Gait Training Goal Selection (PT) gait training, PT goal 1  -TC       Row Name 01/03/25 1400          Bed Mobility Goal 1 (PT)    Activity/Assistive Device (Bed Mobility Goal 1, PT) sit to supine/supine to sit  -TC     [place] Level/Cues Needed (Bed Mobility Goal 1, PT) modified independence  -TC     Time Frame (Bed Mobility Goal 1, PT) 10 days  -TC      Progress/Outcomes (Bed Mobility Goal 1, PT) goal ongoing;progress slower than expected  -TC       Row Name 01/03/25 1400          Transfer Goal 1 (PT)    Activity/Assistive Device (Transfer Goal 1, PT) sit-to-stand/stand-to-sit;bed-to-chair/chair-to-bed;walker, rolling  -TC     [place] Level/Cues Needed (Transfer Goal 1, PT) supervision required  -TC     Time Frame (Transfer Goal 1, PT) long term goal (LTG);10 days  -TC     Progress/Outcome (Transfer Goal 1, PT) goal ongoing;progress slower than expected  -TC       Row Name 01/03/25 1400          Gait Training Goal 1 (PT)    Activity/Assistive Device (Gait Training Goal 1, PT) gait (walking locomotion);assistive device use;walker, rolling  -TC     [place] Level (Gait Training Goal 1, PT) supervision required  -TC     Distance (Gait Training Goal 1, PT) 225  -TC     Time Frame (Gait Training Goal 1, PT) 10 days  -TC     Progress/Outcome (Gait Training Goal 1, PT) goal ongoing;progress slower than expected  -TC               User Key  (r) = Recorded By, (t) = Taken By, (c) = Cosigned By      Initials Name Provider Type    Stan Esposito, RN Registered Nurse    Jackelin Freitas, RN Registered Nurse    Neena Weaver, RN Registered Nurse    Farzana Ma, RN Registered Nurse    Maile Lindsay, PT Physical Therapist                    Physical Therapy Education       Title: PT OT SLP Therapies (In Progress)       Topic: Physical Therapy (In Progress)       Point: Mobility training (Done)       Learning Progress Summary            Patient Acceptance, E,TB, VU by AV at 12/17/2024 1454                      Point: Home exercise program (Not Started)       Learner Progress:  Not documented in this visit.              Point: Body mechanics (Done)       Learning Progress Summary            Patient Acceptance, E,TB, VU by AV at 12/17/2024 1454                      Point: Precautions (Done)       Learning Progress Summary            Patient Acceptance, E,TB,  VU by MITCHEL at 12/17/2024 1454                                      User Key       Initials Effective Dates Name Provider Type Discipline    MITCHEL 06/11/21 -  Kaushik Chavez, PT Physical Therapist PT                  PT Recommendation and Plan  Anticipated Discharge Disposition (PT): inpatient rehabilitation facility  Planned Therapy Interventions (PT): balance training, bed mobility training, gait training, patient/family education, strengthening, transfer training  Therapy Frequency (PT): daily  Progress Summary (PT)  Progress Toward Functional Goals (PT): progress toward functional goals is fair  Daily Progress Summary (PT): Pt continues to present with decreased global muscle strength, decreased balance and stability limiting his ability to perform activities without increased external assist. PT to continue.  Plan of Care Reviewed With: daughter  Outcome Evaluation: Pt continues to demonstrate increased muscle weakness globally, decreased balance and decreased coordination limiting his ability to perform bed mobility and transfers without increased assist. Pt was able to get up with nursing today and get in wheelchair. PT will continue to see pt and work toward meeting goals.   Outcome Measures       Row Name 01/03/25 1500 01/02/25 0852          How much help from another person do you currently need...    Turning from your back to your side while in flat bed without using bedrails? 2  -TC 3  -DK     Moving from lying on back to sitting on the side of a flat bed without bedrails? 2  -TC 2  -DK     Moving to and from a bed to a chair (including a wheelchair)? 2  -TC 2  -DK     Standing up from a chair using your arms (e.g., wheelchair, bedside chair)? 2  -TC 2  -DK     Climbing 3-5 steps with a railing? 1  -TC 1  -DK     To walk in hospital room? 2  -TC 2  -DK     AM-PAC 6 Clicks Score (PT) 11  -TC 12  -DK        Functional Assessment    Outcome Measure Options AM-PAC 6 Clicks Daily Activity (OT)  -TC AM-PAC 6  Clicks Basic Mobility (PT)  -DK               User Key  (r) = Recorded By, (t) = Taken By, (c) = Cosigned By      Initials Name Provider Type    Lia Canales, PTA Physical Therapist Assistant    Maile Lindsay PT Physical Therapist                     Time Calculation:    PT Charges       Row Name 01/03/25 1511             Time Calculation    PT Received On 01/03/25  -TC      PT Goal Re-Cert Due Date 01/12/25  -TC         Untimed Charges    PT Eval/Re-eval Minutes 38  -TC         Total Minutes    Untimed Charges Total Minutes 38  -TC       Total Minutes 38  -TC                User Key  (r) = Recorded By, (t) = Taken By, (c) = Cosigned By      Initials Name Provider Type    Maile Lindsay PT Physical Therapist                  Therapy Charges for Today       Code Description Service Date Service Provider Modifiers Qty    [id number] HC PT EVAL MOD COMPLEXITY 3 1/3/2025 Maile Dueñas, PT GP 1            PT G-Codes  Outcome Measure Options: AM-PAC 6 Clicks Daily Activity (OT)  AM-PAC 6 Clicks Score (PT): 11  AM-PAC 6 Clicks Score (OT): 7    Maile Dueñas PT  1/3/2025

## 2025-01-03 NOTE — PROGRESS NOTES
[facility]     Nephrology Progress Note      Patient Name: Nelson Wang  : 1945  MRN: [id number]  Primary Care Physician:  Domingo Bravo MD  Date of admission: 2024    Subjective   Subjective     Interval History:  Still confused, but more awake today, verbal today.  No distress but seems restless.  Tolerating p.o.      Objective   Objective     Vitals:   Temp:  [97.9 °F (36.6 °C)-101.7 °F (38.7 °C)] 98.5 °F (36.9 °C)  Heart Rate:  [] 104  Resp:  [16-22] 16  BP: (109-152)/(40-94) 132/54  Flow (L/min) (Oxygen Therapy):  [1.5-2.5] 2.5  Physical Exam:   constitutional: Awake, alert, confused, hard of hearing, mildly restless.    Eyes: sclerae anicteric, no conjunctival injection   HENT: mucous membranes moist.     Neck: Supple, no thyromegaly, no lymphadenopathy, trachea midline, No JVD   Respiratory: Decreased to auscultation bilaterally, nonlabored respirations.   Cardiovascular: RRR, no murmurs, rubs, or gallops.   Gastrointestinal: Positive bowel sounds, soft,  nondistended   Musculoskeletal: No edema, no clubbing or cyanosis.  Left upper extremity AV fistula, patent.   Neurologic: moving extremities, answering questions but inconsistently.  Incoherent at times.   Skin: warm and dry, no rashes, areas of hypopigmentation.    Result Review    Result Reviewed:  I have personally reviewed the results from the time of this admission to 1/3/2025 12:46 EST and agree with these findings:  [x]  Laboratory  []  Microbiology  [x]  Radiology  []  EKG/Telemetry   []  Cardiology/Vascular   []  Pathology  [x]  Old records  []  Other:        Lab 25  0900 25  0559 25  0544 24  0521 24  0538 24  0921   SODIUM 132* 140 135* 134* 132* 134*   POTASSIUM 4.8 5.0 4.5 5.3* 4.5 4.3   CHLORIDE 97* 102 96* 97* 94* 93*   CO2 22.3 29.4* 28.1 22.9 23.0 22.3   BUN 25* 38* 23 38* 25* 32*   CREATININE 3.34* 5.94* 4.28* 7.05* 5.37* 6.16*   GLUCOSE 140* 118* 128* 101* 120* 174*   EGFR  18.1* 9.1* 13.5* 7.4* 10.3* 8.7*   ANION GAP 12.7 8.6 10.9 14.1 15.0 18.7*   MAGNESIUM  --   --   --   --   --  2.1   PHOSPHORUS 2.5  --   --   --  4.5 5.8*           Assessment & Plan   Assessment / Plan       Active Hospital Problems:  Active Hospital Problems    Diagnosis     **Hypoxia     Gram-positive bacteremia     ESRD (end stage renal disease)     Essential hypertension     Type 2 diabetes mellitus without complication      Assessment and Plan:    - ESRD, was on home dialysis PTA, left upper extremity AV fistula for access.  Electrolytes are stable.  Dialysis TTS for now.  Now looking for rehab placement per notes.  Family unsure if they would like to go home or rehab.  If outpt dialysis needed will need to arrange for this prior to discharge but will depending on disposition which seems to be developing at this time.       - Aspiration pneumonia, and possibly recurrent aspiration, per pulmonary.      - Type 2 diabetes with complications.     - Hypotension blood pressure is acceptable now, on ProAmatine and blood pressure stable.     - Anemia of chronic kidney disease, on RONALD as outpatient.  Getting Epogen while here.     - Altered mentation.  Remains confused.  Unlikely uremia given consistent dialysis to this point.      - Status post fall, no apparent injury.

## 2025-01-03 NOTE — PLAN OF CARE
Goal Outcome Evaluation:  Plan of Care Reviewed With: patient, spouse, child        Progress: no change  Outcome Evaluation: pt remains aox1 to self, goes in and out of speaking english and . able to get up with assisstance into wheelchair this shift and taken around the unit for a while. remains on 2.5 L NC. BG monitored as ordered. skin and wound care done as ordered. no complaints of pain or discomfort. frequently repositioned in bed. family remained at bedside all of shift. VSS. call light in reach

## 2025-01-03 NOTE — PROGRESS NOTES
[facility]   Hospitalist Progress Note  Date: 1/3/2025  Patient Name: Nelson Wang  : 1945  MRN: [id number]  Date of admission: 2024      Subjective   Subjective     Chief Complaint: Follow-up shortness of breath    Summary:Nelson Wang is a 79 y.o. 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 Patient was started on supplemental oxygen to maintain his oxygen saturations. He was given a dose of Bumex overnight. His chest x-ray was consistent with bilateral pleural effusions. Pulmonology was consulted for the bilateral pleural effusions and and nephrology consulted for possible volume overload. Pulmonology was performing a thoracentesis this morning. Thoracentesis was showing bloody fluid. Patient suffered CODE BLUE. Likely bleeding into his airways causing hypoxic arrest. He received CPR for 6 minutes and achieved ROSC. He was transferred to the ICU, intubated and on blood pressure support. Bronchoscopy was showing blood clots in his airways. Patient was started on CRRT. Patient's medical status is tenuous. He is currently on 1 pressor. CRRT is removing fluid at a slow rate. Patient started on micafungin on 2024. Patient extubated on 2024. Patient on high flow nasal cannula 30 L 25%. Slowly weaning down on supplemental oxygen, breathing more comfortably. Poor mental status still waxing and waning suspect underlying dementia with history of alcohol abuse, mixed with hospital delirium and hyperactive delirium.  Patient continues on hemodialysis with midodrine on dialysis days has required extra Seroquel and occasional as needed medications for sedation to allow him to tolerate hemodialysis.  Working on mentation and initially trying to get back home though patient had a fall on the evening of 2024 and has had difficulty with  transfers and ambulation since.  No fractures on radiographs.  Family considering discharge to inpatient rehab.    Interval Followup: Patient laying in bed appears to be resting fairly comfortably with family at the bedside.  Patient much more restless overnight through this morning per nursing and family at the bedside.  Febrile overnight to 101.7.  Blood cultures obtained.  Heart rate remains slightly elevated.  Blood pressure within normal limits.  Patient requiring 2.5 L nasal cannula keep sats greater than 90%.  proBNP trending up.   White blood cell count within normal limits.  Potassium within normal limits.  Blood sugars well-controlled.  Hemoglobin trending down.  Repeat CT chest demonstrated decreased bilateral pleural effusions though increase in right-sided airspace opacification.  No other issues per nursing.    Review of Systems  Constitutional: Negative for fatigue and fever.   HENT: Negative for sore throat and trouble swallowing.    Eyes: Negative for pain and discharge.   Respiratory: Positive for cough and shortness of breath.    Cardiovascular: Negative for chest pain and palpitations.   Gastrointestinal: Negative for abdominal pain, nausea and vomiting.   Endocrine: Negative for cold intolerance and heat intolerance.   Genitourinary: Negative for difficulty urinating and dysuria.   Musculoskeletal: Negative for back pain and neck stiffness.   Skin: Negative for color change and rash.   Neurological: Negative for syncope and headaches.   Hematological: Negative for adenopathy.   Psychiatric/Behavioral: Positive for confusion and negative hallucinations.    Objective   Objective     Vitals:   Temp:  [97.9 °F (36.6 °C)-101.7 °F (38.7 °C)] 98.6 °F (37 °C)  Heart Rate:  [] 114  Resp:  [16-22] 16  BP: (118-141)/(40-94) 131/62  Flow (L/min) (Oxygen Therapy):  [1.5-235] 235  Physical Exam   General: well-developed appearing stated age in no acute distress  HEENT: Normocephalic atraumatic moist  membranes pupils equal round reactive light, no scleral icterus no conjunctival injection  Cardiovascular: Irregularly irregular, no lower extremity edema appreciated  Pulmonary: Bibasilar crackles no wheezes or rhonchi symmetric chest expansion, unlabored, no conversational dyspnea appreciated  Gastrointestinal: Soft nontender nondistended positive bowel sounds all 4 quadrants no rebound or guarding  Musculoskeletal: No clubbing cyanosis, warm and well-perfused, calves soft symmetric nontender bilaterally  Skin: Clean dry without rashes  Neuro: Cranial nerves II through XII intact grossly no sensorimotor deficits appreciated bilateral upper and lower extremities  Psych: Patient is calm cooperative and appropriate with exam not responding to internal stimuli  : No Kolb catheter no bladder distention no suprapubic tenderness    Result Review    Result Review:  I have personally reviewed these results and agree with these findings:  [x]  Laboratory  LAB RESULTS:      Lab 01/03/25  0900 01/02/25  0559 01/01/25  0544 12/28/24  0921   WBC 5.18 7.11 9.03 5.60   HEMOGLOBIN 7.5* 7.8* 8.2* 9.1*   HEMATOCRIT 26.9* 27.5* 28.1* 30.5*   PLATELETS 179 194 206 230   MCV 93.4 92.6 90.9 90.8         Lab 01/03/25  0900 01/02/25  0559 01/01/25  0544 12/31/24  0521 12/30/24  0538 12/28/24  0921   SODIUM 132* 140 135* 134* 132* 134*   POTASSIUM 4.8 5.0 4.5 5.3* 4.5 4.3   CHLORIDE 97* 102 96* 97* 94* 93*   CO2 22.3 29.4* 28.1 22.9 23.0 22.3   ANION GAP 12.7 8.6 10.9 14.1 15.0 18.7*   BUN 25* 38* 23 38* 25* 32*   CREATININE 3.34* 5.94* 4.28* 7.05* 5.37* 6.16*   EGFR 18.1* 9.1* 13.5* 7.4* 10.3* 8.7*   GLUCOSE 140* 118* 128* 101* 120* 174*   CALCIUM 9.4 9.9 9.9 9.9 9.9 10.1   MAGNESIUM  --   --   --   --   --  2.1   PHOSPHORUS 2.5  --   --   --  4.5 5.8*         Lab 01/03/25  0900 12/30/24  0538 12/28/24  0921   ALBUMIN 2.5* 3.1* 3.1*         Lab 01/03/25  0900   PROBNP 19,861.0*                 Brief Urine Lab Results  (Last result in  the past 365 days)        Color   Clarity   Blood   Leuk Est   Nitrite   Protein   CREAT   Urine HCG        10/27/24 1417 Yellow   Cloudy   Negative   Trace   Negative   >=300 mg/dL (3+)                 Microbiology Results (last 10 days)       Procedure Component Value - Date/Time    Respiratory Panel PCR w/COVID-19(SARS-CoV-2) [facility] In-House, NP Swab in UTM/VTM, 2 HR TAT - Swab, Nasopharynx [[id number]]  (Normal) Collected: 01/02/25 2117    Lab Status: Final result Specimen: Swab from Nasopharynx Updated: 01/02/25 2225     ADENOVIRUS, PCR Not Detected     Coronavirus 229E Not Detected     Coronavirus HKU1 Not Detected     Coronavirus NL63 Not Detected     Coronavirus OC43 Not Detected     COVID19 Not Detected     Human Metapneumovirus Not Detected     Human Rhinovirus/Enterovirus Not Detected     Influenza A PCR Not Detected     Influenza B PCR Not Detected     Parainfluenza Virus 1 Not Detected     Parainfluenza Virus 2 Not Detected     Parainfluenza Virus 3 Not Detected     Parainfluenza Virus 4 Not Detected     RSV, PCR Not Detected     Bordetella pertussis pcr Not Detected     Bordetella parapertussis PCR Not Detected     Chlamydophila pneumoniae PCR Not Detected     Mycoplasma pneumo by PCR Not Detected    Narrative:      In the setting of a positive respiratory panel with a viral infection PLUS a negative procalcitonin without other underlying concern for bacterial infection, consider observing off antibiotics or discontinuation of antibiotics and continue supportive care. If the respiratory panel is positive for atypical bacterial infection (Bordetella pertussis, Chlamydophila pneumoniae, or Mycoplasma pneumoniae), consider antibiotic de-escalation to target atypical bacterial infection.            [x]  Microbiology  [x]  Radiology  CT Chest Without Contrast Diagnostic    Result Date: 1/3/2025   1. Interval decrease in, if not resolution of, the left pleural effusion. Interval slight  decrease in size of the right pleural effusion.  2. There may be interval increase in airspace opacification on the right. Decreased airspace disease is seen on the left.  3. There is moderate cardiomegaly, as before.  4. The study is motion-limited.  5. 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 These findings may contribute to the infectious process within the thorax, especially the pleural effusions. Empyema(s), especially on the right, cannot be excluded. No ectopic gas foci are seen in these areas. Paravertebral abscess or phlegmon, especially on the right, cannot be excluded. Thoracic spine MRI examination without and with intravenous gadolinium-based contrast agent (GBCA) administration may be helpful in further imaging assessment if clinically warranted and if not contraindicated.  6. Please see above comments for further detail.    Portions of this note were completed with a voice recognition program.  Electronically Signed By-Stan Hogan MD On:1/3/2025 1:24 AM      XR Chest 1 View    Result Date: 1/1/2025  Impression: The nasogastric tube has been removed. Otherwise no significant change from previous examination. Low lung volumes, cardiomegaly, small right pleural effusion and patchy bibasilar airspace opacities, atelectasis versus pneumonia. Electronically Signed: Solange Iverson MD  1/1/2025 2:26 PM EST  Workstation ID: [id number]    CT Head Without Contrast    Result Date: 12/30/2024  Impression: 1. No intracranial hemorrhage or acute intracranial abnormality.. 2. Generalized cerebral atrophy with moderate white matter findings of chronic microvascular disease. Electronically Signed: Lasha Ramos MD  12/30/2024 10:38 PM EST  Workstation ID: [id number]    XR Ankle 2 View Right    Result Date: 12/30/2024  Impression: 1. Negative for acute fracture. 2. Stable chronic findings above. Electronically Signed:  Lasha Ramos MD  12/30/2024 3:29 PM EST  Workstation ID: [id number]    CT Abdomen Pelvis Without Contrast    Result Date: 12/29/2024  Impression: CT scan of the abdomen and pelvis without contrast demonstrating small right effusion, slightly larger. Consolidation and/or atelectasis in the right lower lobe appears slightly worsened. 1.1 cm nonobstructing stone in the right renal pelvis is unchanged. The kidneys have an atrophic appearance. Pancolonic diverticulosis without diverticulitis. Chronic compression fractures of T12 and L3 appear stable. Electronically Signed: Clifford Mooney MD  12/29/2024 6:13 PM EST  Workstation ID: [id number]    XR Knee 1 or 2 View Right    Result Date: 12/29/2024  Impression: Negative for displaced fracture or joint malalignment involving right femur or knee. Radiographically uncomplicated hip prosthesis. Electronically Signed: Lorne Bustamante MD  12/29/2024 11:11 AM EST  Workstation ID: [id number]    XR Femur 2 View Right    Result Date: 12/29/2024  Impression: Negative for displaced fracture or joint malalignment involving right femur or knee. Radiographically uncomplicated hip prosthesis. Electronically Signed: Lorne Bustamante MD  12/29/2024 11:11 AM EST  Workstation ID: [id number]    XR Elbow 2 View Right    Result Date: 12/28/2024  No acute fracture or acute malalignment is identified.   Portions of this note were completed with a voice recognition program.  Electronically Signed By-Stan Hogan MD On:12/28/2024 11:33 PM       []  EKG/Telemetry   []  Cardiology/Vascular   []  Pathology  []  Old records  [x]  Other:  Scheduled Meds:arformoterol, 15 mcg, Nebulization, BID - RT  azithromycin, 500 mg, Oral, Daily  budesonide, 0.5 mg, Nebulization, BID - RT  cefTRIAXone, 1,000 mg, Intravenous, Q24H  famotidine, 10 mg, Oral, Daily  insulin lispro, 2-7 Units, Subcutaneous, BID AC  ipratropium-albuterol, 3 mL, Nebulization, Q4H While Awake - RT  levothyroxine, 175 mcg, Oral, Q AM  melatonin, 2.5  mg, Oral, Nightly  midodrine, 10 mg, Oral, Once per day on Tuesday Thursday Saturday  mineral oil-hydrophilic petrolatum, 1 Application, Topical, Daily  QUEtiapine, 37.5 mg, Oral, Nightly  sodium chloride, 10 mL, Intravenous, Q12H      Continuous Infusions:   PRN Meds:.  acetaminophen    albuterol    artificial tears    senna-docusate sodium **AND** polyethylene glycol **AND** [DISCONTINUED] bisacodyl **AND** bisacodyl    calamine    dextrose    dextrose    Diclofenac Sodium    glucagon (human recombinant)    guaifenesin    heparin (porcine)    heparin (porcine)    hydrocortisone ace-pramoxine    Lidocaine    ondansetron    Polyvinyl Alcohol-Povidone PF    QUEtiapine    sodium chloride    sodium chloride    sodium chloride    trolamine salicylate      Assessment & Plan   Assessment / Plan     Assessment/Plan:  Suspected aspiration pneumonia  Cardiac arrest secondary to hypoxemia in setting of thoracentesis  Acute hypoxic/hypercapnic respiratory failure requiring mechanical ventilation  Acute metabolic encephalopathy/altered mental status, initial concern for seizures, EEG negative x 2  Acute on chronic diastolic heart failure  Acute cardiogenic pulmonary edema  Bilateral pleural effusions  Hemoptysis  Candidemia s/p iv micafungin course completed 12/22/24  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  Start scheduled DuoNebs  Continue supplemental oxygen titrate to keep  sats greater than 90%  Continue to encourage pulmonary toilet  Continue bronchopulmonary hygiene protocol  Continue to encourage incentive spirometry  Continue aspiration precautions  Continue speech therapy  Downgrade diet to puréed with thin liquids  Continue Brovana, Pulmicort scheduled with as needed DuoNebs  Continue hemodialysis and ultrafiltration per nephrology  Continue midodrine 10 mg p.o. on dialysis days  Continue to hold Eliquis due to hemoptysis and hemothorax  Continue sliding scale insulin  Continue Seroquel 37.5 mg at night and titrate as needed  Continue regular reorientation  Reinforce day night cycle  Continue physical therapy Occupational Therapy  Continue monitor anemia and transfuse as needed keep hemoglobin greater than 7  Continue levothyroxine  Further inpatient orders or recommendations pending clinical course         Discussed plan with bedside RN as well as Dr. Vines psychiatry attending.    Disposition: Inpatient rehab.  Discharge planning coordinating.      VTE Prophylaxis:  Pharmacologic & mechanical VTE prophylaxis orders are present.        CODE STATUS:   Level Of Support Discussed With: Next of Kin (If No Surrogate)  Code Status (Patient has no pulse and is not breathing): No CPR (Do Not Attempt to Resuscitate)  Medical Interventions (Patient has pulse or is breathing): Full Support

## 2025-01-03 NOTE — PLAN OF CARE
Goal Outcome Evaluation:  Plan of Care Reviewed With: daughter           Outcome Evaluation: Pt continues to demonstrate increased muscle weakness globally, decreased balance and decreased coordination limiting his ability to perform bed mobility and transfers without increased assist. Pt was able to get up with nursing today and get in wheelchair. PT will continue to see pt and work toward meeting goals.    Anticipated Discharge Disposition (PT): inpatient rehabilitation facility

## 2025-01-03 NOTE — THERAPY TREATMENT NOTE
Acute Care - Speech Language Pathology   Swallow Treatment Note BRIA Sullivan     Patient Name: Nelson Wang  : 1945  MRN: [id number]  Today's Date: 1/3/2025               Admit Date: 2024    Visit Dx:     ICD-10-CM ICD-9-CM   1. Acute respiratory failure with hypoxia  J96.01 518.81   2. Acute pulmonary edema  J81.0 518.4   3. Difficulty walking  R26.2 719.7   4. Oropharyngeal dysphagia  R13.12 787.22     Patient Active Problem List   Diagnosis    Essential hypertension    Bradycardia, sinus    Anemia due to chronic kidney disease    Hypothyroidism    Idiopathic gout    Proteinuria    Psoriasis    Thrombocytopenia    Type 2 diabetes mellitus without complication    CKD (chronic kidney disease), stage IV    Hyperlipidemia    Monoclonal gammopathy of unknown significance (MGUS)    Stage 5 chronic kidney disease    Chronic gout without tophus    Peritoneal dialysis catheter dysfunction    Medicare annual wellness visit, subsequent    Chronic cough    Peritonitis associated with peritoneal dialysis    Recurrent pneumonia    Acute on chronic respiratory failure with hypoxia    ESRD (end stage renal disease)    Aspiration pneumonitis    Sepsis    Type 2 diabetes mellitus, with long-term current use of insulin    GERD without esophagitis    Immunosuppression due to drug therapy    Bipolar disorder    Chronic respiratory failure with hypoxia    Shortness of breath    Abnormal stress test    ESRD on dialysis    Abnormal chest CT    Encounter for screening for malignant neoplasm of colon    History of colon polyps    Family history of colon cancer    Intractable pain    Abdominal pain    ESRD (end stage renal disease) on dialysis    AMS (altered mental status)    Hallucinations    LUQ pain    Discitis    Metabolic encephalopathy    Aspiration pneumonia    Discitis of lumbar region    Severe malnutrition    Dementia    Unspecified severe protein-calorie malnutrition    Hypoxia    Gram-positive bacteremia     Past  Medical History:   Diagnosis Date    Abnormal stress test     Anemia     IRON INFUSIONS WITH DIALYSIS    Anesthesia     WITH HIP REPLACEMENT DAUGHTER BELIEVES HAD SVT 2000    Ankle pain, right     Anxiety and depression     Arthritis     CKD (chronic kidney disease), stage IV     DIALYSIS [id number] SHILEY IN RIGHT CHEST    Diabetes     GERD (gastroesophageal reflux disease)     Gout     High cholesterol     History of peritoneal dialysis     HL (hearing loss)     Hyperkalemia     Hypertension     Hypothyroidism     Insomnia     Night terrors     Psoriasis     Psoriasis     Sleep apnea     Sleep walking     Thrombocytopenia     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    Vitiligo      Past Surgical History:   Procedure Laterality Date    ABDOMINAL SURGERY      ANKLE SURGERY Right 11/1990    APPENDECTOMY N/A 1954    ARTERIOVENOUS FISTULA/SHUNT SURGERY Left 07/16/2024    Procedure: Creation of left arm arteriovenous fistula;  Surgeon: Moses Mir MD;  Location: [facility] MAIN OR;  Service: Vascular;  Laterality: Left;    BRONCHOSCOPY N/A 03/01/2024    Procedure: BRONCHOSCOPY WITH BAL AND WASHINGS;  Surgeon: Sandra Espinal MD;  Location: [facility] ENDOSCOPY;  Service: Pulmonary;  Laterality: N/A;  PNEUMONIA    BRONCHOSCOPY N/A 08/30/2024    Procedure: BRONCHOSCOPY WITH BALS AND WASHINGS;  Surgeon: Sandra Espinal MD;  Location: [facility] ENDOSCOPY;  Service: Pulmonary;  Laterality: N/A;  MUCOUS PLUGGING    CARDIAC CATHETERIZATION N/A 05/29/2024    Procedure: Left Heart Cath with possible coronary angioplasty;  Surgeon: Stephan Nichols MD;  Location: [facility] CATH INVASIVE LOCATION;  Service: Cardiology;  Laterality: N/A;    COLONOSCOPY N/A 06/2022    [facility]    ENDOSCOPY N/A 10/28/2024    Procedure: ESOPHAGOGASTRODUODENOSCOPY INSERTION OF LIGHTED INSTRUMENT TO VIEW ESOPHAGUS, STOMACH AND SMALL INTESTINE;  Surgeon: Kingsley Peraza MD;  Location: [facility] ENDOSCOPY;  Service: Gastroenterology;  Laterality:  N/A;  Gastritis    FRACTURE SURGERY      HIP BIPOLAR REPLACEMENT Right 01/2000    INSERTION HEMODIALYSIS CATHETER Left 04/09/2024    Procedure: HEMODIALYSIS CATHETER INSERTION;  Surgeon: Jose Berry MD;  [facility] MA[facility];  Service: General;  Laterality: Left;    INSERTION HEMODIALYSIS CATHETER N/A 04/12/2024    Procedure: Tunneled hemodialysis catheter insertion;  Surgeon: Enrique Vinson MD;  [facility] MA[facility];  Service: Vascular;  Laterality: N/A;    INSERTION PERITONEAL DIALYSIS CATHETER N/A 03/27/2023    Procedure: LAPAROSCOPIC INSERTION PERITONEAL DIALYSIS CATHETER, LAPAROSCOPIC OMENTOPEXY WITH LYSIS OF ADHESIONS;  Surgeon: Jose Berry MD;  Location:  [facility] MAIN OR;  Service: General;  Laterality: N/A;    INSERTION PERITONEAL DIALYSIS CATHETER Left 07/23/2023    Procedure: REVISION OF PERITONEAL DIALYSIS CATHETER;  Surgeon: Radha Oreilly MD;  [facility] MA[facility];  Service: General;  Laterality: Left;    JOINT REPLACEMENT      REMOVAL PERITONEAL DIALYSIS CATHETER N/A 04/09/2024    Procedure: REMOVAL PERITONEAL DIALYSIS CATHETER;  Surgeon: Jose Berry MD;  [facility]: [facility] MA[facility];  Service: General;  Laterality: N/A;    RENAL BIOPSY Left 07/15/2022    UPPER GASTROINTESTINAL ENDOSCOPY      VASCULAR SURGERY      WRIST SURGERY      UNSURE WHICH SIDE DAUGHTER REPORTS HAD SEVERE  CUT FROM WINDOW AND THEY HAD TO DO RECONSTRUCTIVE SURGERY WITH VESSELS AND NERVES       SPEECH PATHOLOGY DYSPHAGIA TREATMENT     Subjective/Behavioral Observations: Patient awake on entry, assisted for positioning.  Daughter present, assisting with feeding.  Educated for strategies.        Day/time of Treatment: 1/3/2025:        Current Diet: Purée, thin        Current Strategies:One-on-one assist to feed self only when patient is fully awake, alert, and participating.  Alternate small bites and small sips of solids and liquids at a slow rate.  Check swallow each bite/sip.          Treatment received: Dysphagia therapy to address swallow function through exercises and education of strategies.        Results of treatment:   Patient required mod -max cueing to maintain attention to task.  P.o. intake with less than 25% of a.m. meal.  Daughter assisting to feed.  Patient with increased oral prep time, swallows completed with minimal oral residue remaining.  Thin liquids by assisted cup drink with minimal delay of swallow.  Vocal quality remained clear with limited trials.        Progress toward goals: Minimal     Barriers to Achieving goals: Medical status        Plan of care:/changes in plan: 1.  Continue diet of purée solids, thin liquid.  No straw.  2. positioning fully upright for all p.o. intake and 30 minutes following.  3. one-to-one assist to feed self, alternate small bites and small sips at a slow rate.  Check for pocketing.  Assist to feed self only when patient is fully alert, participating to feed self.    **Note patient must be fully alert for any p.o. intake.                                                                                          EDUCATION  The patient has been educated in the following areas:   Modified Diet Instruction.                Time Calculation:    Time Calculation- SLP       Row Name 01/03/25 0922             Time Calculation- SLP    SLP Stop Time 0915  -TB      SLP Received On 01/03/25  -TB         Untimed Charges    93326-CJ Treatment Swallow Minutes 40  -TB         Total Minutes    Untimed Charges Total Minutes 40  -TB       Total Minutes 40  -TB                User Key  (r) = Recorded By, (t) = Taken By, (c) = Cosigned By      Initials Name Provider Type    TB Katie Isaacs SLP Speech and Language Pathologist                    Therapy Charges for Today       Code Description Service Date Service Provider Modifiers Qty    25154591142 HC ST TREATMENT SWALLOW 3 1/2/2025 Katie Isaacs SLP GN 1    [id number] HC ST TREATMENT SWALLOW 3 1/3/2025  Katie Isaacs, SLP GN 1                 Katie Isaacs, SLP  1/3/2025

## 2025-01-04 LAB
ALBUMIN SERPL-MCNC: 3 G/DL (ref 3.5–5.2)
ANION GAP SERPL CALCULATED.3IONS-SCNC: 11.3 MMOL/L (ref 5–15)
BUN SERPL-MCNC: 43 MG/DL (ref 8–23)
BUN/CREAT SERPL: 8.7 (ref 7–25)
CALCIUM SPEC-SCNC: 9.8 MG/DL (ref 8.6–10.5)
CHLORIDE SERPL-SCNC: 98 MMOL/L (ref 98–107)
CO2 SERPL-SCNC: 28.7 MMOL/L (ref 22–29)
CREAT SERPL-MCNC: 4.93 MG/DL (ref 0.76–1.27)
DEPRECATED RDW RBC AUTO: 65.6 FL (ref 37–54)
EGFRCR SERPLBLD CKD-EPI 2021: 11.4 ML/MIN/1.73
ERYTHROCYTE [DISTWIDTH] IN BLOOD BY AUTOMATED COUNT: 19.7 % (ref 12.3–15.4)
GLUCOSE BLDC GLUCOMTR-MCNC: 123 MG/DL (ref 70–99)
GLUCOSE BLDC GLUCOMTR-MCNC: 126 MG/DL (ref 70–99)
GLUCOSE SERPL-MCNC: 132 MG/DL (ref 65–99)
HCT VFR BLD AUTO: 24.5 % (ref 37.5–51)
HGB BLD-MCNC: 7.1 G/DL (ref 13–17.7)
MCH RBC QN AUTO: 26.3 PG (ref 26.6–33)
MCHC RBC AUTO-ENTMCNC: 29 G/DL (ref 31.5–35.7)
MCV RBC AUTO: 90.7 FL (ref 79–97)
PHOSPHATE SERPL-MCNC: 5.3 MG/DL (ref 2.5–4.5)
PLATELET # BLD AUTO: 199 10*3/MM3 (ref 140–450)
PMV BLD AUTO: 10.2 FL (ref 6–12)
POTASSIUM SERPL-SCNC: 4.4 MMOL/L (ref 3.5–5.2)
RBC # BLD AUTO: 2.7 10*6/MM3 (ref 4.14–5.8)
SODIUM SERPL-SCNC: 138 MMOL/L (ref 136–145)
WBC NRBC COR # BLD AUTO: 6.06 10*3/MM3 (ref 3.4–10.8)

## 2025-01-04 PROCEDURE — 99232 SBSQ HOSP IP/OBS MODERATE 35: CPT | Performed by: FAMILY MEDICINE

## 2025-01-04 PROCEDURE — 94664 DEMO&/EVAL PT USE INHALER: CPT

## 2025-01-04 PROCEDURE — 94799 UNLISTED PULMONARY SVC/PX: CPT

## 2025-01-04 PROCEDURE — 94761 N-INVAS EAR/PLS OXIMETRY MLT: CPT

## 2025-01-04 PROCEDURE — 94668 MNPJ CHEST WALL SBSQ: CPT

## 2025-01-04 PROCEDURE — 82948 REAGENT STRIP/BLOOD GLUCOSE: CPT | Performed by: INTERNAL MEDICINE

## 2025-01-04 PROCEDURE — 82948 REAGENT STRIP/BLOOD GLUCOSE: CPT

## 2025-01-04 PROCEDURE — 25010000002 CEFTRIAXONE PER 250 MG: Performed by: STUDENT IN AN ORGANIZED HEALTH CARE EDUCATION/TRAINING PROGRAM

## 2025-01-04 PROCEDURE — 80069 RENAL FUNCTION PANEL: CPT | Performed by: FAMILY MEDICINE

## 2025-01-04 PROCEDURE — 25810000003 SODIUM CHLORIDE 0.9 % SOLUTION: Performed by: FAMILY MEDICINE

## 2025-01-04 PROCEDURE — 85027 COMPLETE CBC AUTOMATED: CPT | Performed by: FAMILY MEDICINE

## 2025-01-04 RX ORDER — MIDODRINE HYDROCHLORIDE 10 MG/1
10 TABLET ORAL
Status: DISCONTINUED | OUTPATIENT
Start: 2025-01-04 | End: 2025-01-06

## 2025-01-04 RX ADMIN — SODIUM CHLORIDE 1000 ML: 9 INJECTION, SOLUTION INTRAVENOUS at 18:14

## 2025-01-04 RX ADMIN — ARFORMOTEROL TARTRATE 15 MCG: 15 SOLUTION RESPIRATORY (INHALATION) at 06:47

## 2025-01-04 RX ADMIN — MIDODRINE HYDROCHLORIDE 10 MG: 10 TABLET ORAL at 21:16

## 2025-01-04 RX ADMIN — WHITE PETROLATUM 1 APPLICATION: 1.75 OINTMENT TOPICAL at 18:16

## 2025-01-04 RX ADMIN — ARFORMOTEROL TARTRATE 15 MCG: 15 SOLUTION RESPIRATORY (INHALATION) at 18:41

## 2025-01-04 RX ADMIN — ACETAMINOPHEN 650 MG: 325 TABLET ORAL at 03:38

## 2025-01-04 RX ADMIN — MIDODRINE HYDROCHLORIDE 10 MG: 10 TABLET ORAL at 17:11

## 2025-01-04 RX ADMIN — BUDESONIDE 0.5 MG: 0.5 INHALANT ORAL at 18:41

## 2025-01-04 RX ADMIN — GUAIFENESIN 100 MG: 100 LIQUID ORAL at 03:38

## 2025-01-04 RX ADMIN — QUETIAPINE FUMARATE 12.5 MG: 25 TABLET ORAL at 07:49

## 2025-01-04 RX ADMIN — Medication 10 ML: at 21:17

## 2025-01-04 RX ADMIN — IPRATROPIUM BROMIDE AND ALBUTEROL SULFATE 3 ML: 2.5; .5 SOLUTION RESPIRATORY (INHALATION) at 18:41

## 2025-01-04 RX ADMIN — BUDESONIDE 0.5 MG: 0.5 INHALANT ORAL at 06:46

## 2025-01-04 RX ADMIN — Medication 10 ML: at 18:15

## 2025-01-04 RX ADMIN — AZITHROMYCIN 500 MG: 250 TABLET, FILM COATED ORAL at 17:12

## 2025-01-04 RX ADMIN — IPRATROPIUM BROMIDE AND ALBUTEROL SULFATE 3 ML: 2.5; .5 SOLUTION RESPIRATORY (INHALATION) at 06:46

## 2025-01-04 RX ADMIN — SODIUM CHLORIDE 1000 MG: 9 INJECTION INTRAMUSCULAR; INTRAVENOUS; SUBCUTANEOUS at 21:17

## 2025-01-04 RX ADMIN — LEVOTHYROXINE SODIUM 175 MCG: 0.03 TABLET ORAL at 04:27

## 2025-01-04 NOTE — NURSING NOTE
Pt completed 4 hour dialysis session at 1405.    73.4BLP and no fluid removed. Pt received a total of 1L of fluid due to hypotension during treatment, see previous note.    With 1hr 10 min left pt tmp climbed and pressure alarms sounded indicating clotting.    Pt blood returned, machine restrung, and treatment resumed.    With 1 hour left patient became very alert. Speaking, asking this RN questions, requesting water and food, and joking with staff.    Pt post dialysis VSS. /50 .    Report called to Kaushik MARADIAGA. All questions addressed at this time.

## 2025-01-04 NOTE — NURSING NOTE
Pt lethargic after dialysis this shift, rechecked vitals @1800. See chart. Notified MD about BP being soft. 1000mL bolus of NS started.

## 2025-01-04 NOTE — PLAN OF CARE
Goal Outcome Evaluation:  Plan of Care Reviewed With: patient        Progress: no change  Outcome Evaluation: Pt remains alert to self only this shift. Safety sitter is at the bedside. Family was briefly at the bedside at the beginning of shift. VSS. Pt has been able to rest this shift, clustered care and dimmed lighting to promote sleep and rest. Falls precaution maintained. No new issues or new needs noted at this time.

## 2025-01-04 NOTE — NURSING NOTE
On pt arrival to dialysis unit at 0840 pt was oriented to self only per baseline. Pt was quiet but responding to some questions. BP 97/50 .    Treatment initiated at 0850 /51     On first vital sign check at 0915 pt BP alerted at 57/30 . BP rechecked in BUCK and found to be 68/44. Moved pt BP cuff to RLE 82/58 so 100cc fluid bolus given.     BP set to cycle every 5 minutes at this time. Pt still responsive and answering some questions per baseline.     BP did not increase with bolus so another 100cc bolus given at 0933.     Manual BP check performed for confirmation of BP, found to be 68/40. Recheck with machine on BUCK 79/39. 100cc bolus given.    At 0957 BP 88/45 HR 80.    Dimpi, the patients daughter to the room at 1008. Updated on patients status.    Dr. Cha contacted at 1010. Verbal order for 500cc bolus of NS to be given. Terminate dialysis treatment if systolic pressure drops into the 80s again. Confirmed orders and informed patients daughter.    500cc bolus NS given at 1012. BP prior to bolus noted to be 98/46 .    Next BP at 1020 106/50 . Pt has known afib and history of heart rhythm change from NS to afib while on dialysis.    Pt daughter remains at the bedside throughout along with safety sitter. Dr. Wang is very directable and follows commands throughout. Pt is speaking some to daughter and requesting food at 1045. Pt and daughter informed NPO while on dialysis due to hypotension. Verbalized understanding.

## 2025-01-04 NOTE — PROGRESS NOTES
Psychiatric   Hospitalist Progress Note  Date: 2025  Patient Name: Nelson Wang  : 1945  MRN: [id number]  Date of admission: 2024      Subjective   Subjective     Chief Complaint: Follow-up shortness of breath    Summary:Nelson Wang is a 79 y.o. 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 Patient was started on supplemental oxygen to maintain his oxygen saturations. He was given a dose of Bumex overnight. His chest x-ray was consistent with bilateral pleural effusions. Pulmonology was consulted for the bilateral pleural effusions and and nephrology consulted for possible volume overload. Pulmonology was performing a thoracentesis this morning. Thoracentesis was showing bloody fluid. Patient suffered CODE BLUE. Likely bleeding into his airways causing hypoxic arrest. He received CPR for 6 minutes and achieved ROSC. He was transferred to the ICU, intubated and on blood pressure support. Bronchoscopy was showing blood clots in his airways. Patient was started on CRRT. Patient's medical status is tenuous. He is currently on 1 pressor. CRRT is removing fluid at a slow rate. Patient started on micafungin on 2024. Patient extubated on 2024. Patient on high flow nasal cannula 30 L 25%. Slowly weaning down on supplemental oxygen, breathing more comfortably. Poor mental status still waxing and waning suspect underlying dementia with history of alcohol abuse, mixed with hospital delirium and hyperactive delirium.  Patient continues on hemodialysis with midodrine on dialysis days has required extra Seroquel and occasional as needed medications for sedation to allow him to tolerate hemodialysis.  Working on mentation and initially trying to get back home though patient had a fall on the evening of 2024 and has had difficulty with  transfers and ambulation since.  No fractures on radiographs.  Family considering discharge to inpatient rehab.    Interval Followup: Patient laying in bed appears to be resting fairly comfortably in dialysis.  Patient slept well overnight dialysis today.  Afebrile overnight.  Heart rate remains slightly elevated.  Low during dialysis despite midodrine and was bolused isotonic fluids.  Patient requiring 2 L nasal cannula keep sats greater than 90%.   Hemoglobin trending down.    No other issues per nursing.    Review of Systems  Constitutional: Negative for fatigue and fever.   HENT: Negative for sore throat and positive trouble swallowing.    Eyes: Negative for pain and discharge.   Respiratory: Positive for cough and shortness of breath.    Cardiovascular: Negative for chest pain and palpitations.   Gastrointestinal: Negative for abdominal pain, nausea and vomiting.   Endocrine: Negative for cold intolerance and heat intolerance.   Genitourinary: Negative for difficulty urinating and dysuria.   Musculoskeletal: Negative for back pain and neck stiffness.   Skin: Negative for color change and rash.   Neurological: Negative for syncope and headaches.   Hematological: Negative for adenopathy.   Psychiatric/Behavioral: Positive for confusion and negative hallucinations.    Objective   Objective     Vitals:   Temp:  [97.3 °F (36.3 °C)-98.7 °F (37.1 °C)] 97.4 °F (36.3 °C)  Heart Rate:  [] 95  Resp:  [12-22] 16  BP: ()/(30-76) 126/60  Flow (L/min) (Oxygen Therapy):  [2] 2  Physical Exam   General: well-developed appearing stated age in no acute distress  HEENT: Normocephalic atraumatic moist membranes pupils equal round reactive light, no scleral icterus no conjunctival injection  Cardiovascular: Irregularly irregular, no lower extremity edema appreciated  Pulmonary: Bibasilar crackles no wheezes or rhonchi symmetric chest expansion, unlabored, no conversational dyspnea appreciated  Gastrointestinal: Soft  nontender nondistended positive bowel sounds all 4 quadrants no rebound or guarding  Musculoskeletal: No clubbing cyanosis, warm and well-perfused, calves soft symmetric nontender bilaterally  Skin: Clean dry without rashes  Neuro: Cranial nerves II through XII intact grossly no sensorimotor deficits appreciated bilateral upper and lower extremities  Psych: Patient is calm cooperative and appropriate with exam not responding to internal stimuli  : No Kolb catheter no bladder distention no suprapubic tenderness    Result Review    Result Review:  I have personally reviewed these results and agree with these findings:  [x]  Laboratory  LAB RESULTS:      Lab 01/04/25  0844 01/03/25  0900 01/02/25  0559 01/01/25  0544   WBC 6.06 5.18 7.11 9.03   HEMOGLOBIN 7.1* 7.5* 7.8* 8.2*   HEMATOCRIT 24.5* 26.9* 27.5* 28.1*   PLATELETS 199 179 194 206   MCV 90.7 93.4 92.6 90.9         Lab 01/04/25  0844 01/03/25  0900 01/02/25  0559 01/01/25  0544 12/31/24  0521 12/30/24  0538   SODIUM 138 132* 140 135* 134* 132*   POTASSIUM 4.4 4.8 5.0 4.5 5.3* 4.5   CHLORIDE 98 97* 102 96* 97* 94*   CO2 28.7 22.3 29.4* 28.1 22.9 23.0   ANION GAP 11.3 12.7 8.6 10.9 14.1 15.0   BUN 43* 25* 38* 23 38* 25*   CREATININE 4.93* 3.34* 5.94* 4.28* 7.05* 5.37*   EGFR 11.4* 18.1* 9.1* 13.5* 7.4* 10.3*   GLUCOSE 132* 140* 118* 128* 101* 120*   CALCIUM 9.8 9.4 9.9 9.9 9.9 9.9   PHOSPHORUS 5.3* 2.5  --   --   --  4.5         Lab 01/04/25  0844 01/03/25  0900 12/30/24  0538   ALBUMIN 3.0* 2.5* 3.1*         Lab 01/03/25  0900   PROBNP 19,861.0*                 Brief Urine Lab Results  (Last result in the past 365 days)        Color   Clarity   Blood   Leuk Est   Nitrite   Protein   CREAT   Urine HCG        10/27/24 1417 Yellow   Cloudy   Negative   Trace   Negative   >=300 mg/dL (3+)                 Microbiology Results (last 10 days)       Procedure Component Value - Date/Time    Blood Culture - Blood, Arm, Right [[id number]]  (Normal) Collected: 01/02/25  2157    Lab Status: Preliminary result Specimen: Blood from Arm, Right Updated: 01/03/25 2215     Blood Culture No growth at 24 hours    Narrative:      Less than seven (7) mL's of blood was collected.  Insufficient quantity may yield false negative results.    Blood Culture - Blood, Arm, Right [[id number]]  (Normal) Collected: 01/02/25 2157    Lab Status: Preliminary result Specimen: Blood from Arm, Right Updated: 01/03/25 2215     Blood Culture No growth at 24 hours    Narrative:      Less than seven (7) mL's of blood was collected.  Insufficient quantity may yield false negative results.    Respiratory Panel PCR w/COVID-19(SARS-CoV-2) [facility] In-House, NP Swab in UTM/VTM, 2 HR TAT - Swab, Nasopharynx [[id number]]  (Normal) Collected: 01/02/25 2117    Lab Status: Final result Specimen: Swab from Nasopharynx Updated: 01/02/25 2225     ADENOVIRUS, PCR Not Detected     Coronavirus 229E Not Detected     Coronavirus HKU1 Not Detected     Coronavirus NL63 Not Detected     Coronavirus OC43 Not Detected     COVID19 Not Detected     Human Metapneumovirus Not Detected     Human Rhinovirus/Enterovirus Not Detected     Influenza A PCR Not Detected     Influenza B PCR Not Detected     Parainfluenza Virus 1 Not Detected     Parainfluenza Virus 2 Not Detected     Parainfluenza Virus 3 Not Detected     Parainfluenza Virus 4 Not Detected     RSV, PCR Not Detected     Bordetella pertussis pcr Not Detected     Bordetella parapertussis PCR Not Detected     Chlamydophila pneumoniae PCR Not Detected     Mycoplasma pneumo by PCR Not Detected    Narrative:      In the setting of a positive respiratory panel with a viral infection PLUS a negative procalcitonin without other underlying concern for bacterial infection, consider observing off antibiotics or discontinuation of antibiotics and continue supportive care. If the respiratory panel is positive for atypical bacterial infection (Bordetella pertussis, Chlamydophila  pneumoniae, or Mycoplasma pneumoniae), consider antibiotic de-escalation to target atypical bacterial infection.            [x]  Microbiology  [x]  Radiology  CT Chest Without Contrast Diagnostic    Result Date: 1/3/2025   1. Interval decrease in, if not resolution of, the left pleural effusion. Interval slight decrease in size of the right pleural effusion.  2. There may be interval increase in airspace opacification on the right. Decreased airspace disease is seen on the left.  3. There is moderate cardiomegaly, as before.  4. The study is motion-limited.  5. 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 These findings may contribute to the infectious process within the thorax, especially the pleural effusions. Empyema(s), especially on the right, cannot be excluded. No ectopic gas foci are seen in these areas. Paravertebral abscess or phlegmon, especially on the right, cannot be excluded. Thoracic spine MRI examination without and with intravenous gadolinium-based contrast agent (GBCA) administration may be helpful in further imaging assessment if clinically warranted and if not contraindicated.  6. Please see above comments for further detail.    Portions of this note were completed with a voice recognition program.  Electronically Signed By-Stan Hogan MD On:1/3/2025 1:24 AM      XR Chest 1 View    Result Date: 1/1/2025  Impression: The nasogastric tube has been removed. Otherwise no significant change from previous examination. Low lung volumes, cardiomegaly, small right pleural effusion and patchy bibasilar airspace opacities, atelectasis versus pneumonia. Electronically Signed: Solange Iverson MD  1/1/2025 2:26 PM EST  Workstation ID: [id number]    CT Head Without Contrast    Result Date: 12/30/2024  Impression: 1. No intracranial hemorrhage or acute intracranial abnormality.. 2. Generalized cerebral atrophy with  moderate white matter findings of chronic microvascular disease. Electronically Signed: Lasha Ramos MD  12/30/2024 10:38 PM EST  Workstation ID: [id number]    XR Ankle 2 View Right    Result Date: 12/30/2024  Impression: 1. Negative for acute fracture. 2. Stable chronic findings above. Electronically Signed: Lasha Ramos MD  12/30/2024 3:29 PM EST  Workstation ID: [id number]    CT Abdomen Pelvis Without Contrast    Result Date: 12/29/2024  Impression: CT scan of the abdomen and pelvis without contrast demonstrating small right effusion, slightly larger. Consolidation and/or atelectasis in the right lower lobe appears slightly worsened. 1.1 cm nonobstructing stone in the right renal pelvis is unchanged. The kidneys have an atrophic appearance. Pancolonic diverticulosis without diverticulitis. Chronic compression fractures of T12 and L3 appear stable. Electronically Signed: Clifford Mooney MD  12/29/2024 6:13 PM EST  Workstation ID: [id number]    XR Knee 1 or 2 View Right    Result Date: 12/29/2024  Impression: Negative for displaced fracture or joint malalignment involving right femur or knee. Radiographically uncomplicated hip prosthesis. Electronically Signed: Lorne Bustamante MD  12/29/2024 11:11 AM EST  Workstation ID: [id number]    XR Femur 2 View Right    Result Date: 12/29/2024  Impression: Negative for displaced fracture or joint malalignment involving right femur or knee. Radiographically uncomplicated hip prosthesis. Electronically Signed: Lorne Bustamante MD  12/29/2024 11:11 AM EST  Workstation ID: [id number]    XR Elbow 2 View Right    Result Date: 12/28/2024  No acute fracture or acute malalignment is identified.   Portions of this note were completed with a voice recognition program.  Electronically Signed By-Stan Hogan MD On:12/28/2024 11:33 PM       []  EKG/Telemetry   []  Cardiology/Vascular   []  Pathology  []  Old records  [x]  Other:  Scheduled Meds:arformoterol, 15 mcg, Nebulization, BID -  RT  azithromycin, 500 mg, Oral, Daily  budesonide, 0.5 mg, Nebulization, BID - RT  cefTRIAXone, 1,000 mg, Intravenous, Q24H  famotidine, 10 mg, Oral, Daily  insulin lispro, 2-7 Units, Subcutaneous, BID AC  ipratropium-albuterol, 3 mL, Nebulization, Q4H While Awake - RT  levothyroxine, 175 mcg, Oral, Q AM  melatonin, 2.5 mg, Oral, Nightly  midodrine, 10 mg, Oral, Once per day on Tuesday Thursday Saturday  mineral oil-hydrophilic petrolatum, 1 Application, Topical, Daily  QUEtiapine, 37.5 mg, Oral, Nightly  sodium chloride, 10 mL, Intravenous, Q12H      Continuous Infusions:   PRN Meds:.  acetaminophen    albuterol    artificial tears    senna-docusate sodium **AND** polyethylene glycol **AND** [DISCONTINUED] bisacodyl **AND** bisacodyl    calamine    dextrose    dextrose    Diclofenac Sodium    glucagon (human recombinant)    guaifenesin    heparin (porcine)    heparin (porcine)    hydrocortisone ace-pramoxine    Lidocaine    ondansetron    Polyvinyl Alcohol-Povidone PF    QUEtiapine    sodium chloride    sodium chloride    sodium chloride    trolamine salicylate      Assessment & Plan   Assessment / Plan     Assessment/Plan:  Suspected aspiration pneumonia  Cardiac arrest secondary to hypoxemia in setting of thoracentesis  Acute hypoxic/hypercapnic respiratory failure requiring mechanical ventilation  Acute metabolic encephalopathy/altered mental status, initial concern for seizures, EEG negative x 2  Acute on chronic diastolic heart failure  Acute cardiogenic pulmonary edema  Bilateral pleural effusions  Hemoptysis  Candidemia s/p iv micafungin course completed 12/22/24  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  Continue scheduled DuoNebs  Continue supplemental oxygen titrate to keep sats greater than 90%  Continue to encourage pulmonary toilet  Continue bronchopulmonary hygiene protocol  Continue to encourage incentive spirometry  Continue aspiration precautions  Continue speech therapy  Downgrade diet to puréed with thin liquids  Continue Brovana, Pulmicort scheduled with as needed DuoNebs  Continue hemodialysis and ultrafiltration per nephrology  Continue midodrine 10 mg p.o. on dialysis days  Continue to hold Eliquis due to hemoptysis and hemothorax  Continue sliding scale insulin  Continue Seroquel 37.5 mg at night and titrate as needed  Continue regular reorientation  Continue to reinforce day night cycle  Continue physical therapy Occupational Therapy  Continue monitor anemia and transfuse as needed keep hemoglobin greater than 7  Continue levothyroxine  Further inpatient orders or recommendations pending clinical course         Discussed plan with bedside RN .    Disposition: Inpatient rehab.  Discharge planning coordinating.      VTE Prophylaxis:  Pharmacologic & mechanical VTE prophylaxis orders are present.        CODE STATUS:   Level Of Support Discussed With: Next of Kin (If No Surrogate)  Code Status (Patient has no pulse and is not breathing): No CPR (Do Not Attempt to Resuscitate)  Medical Interventions (Patient has pulse or is breathing): Full Support

## 2025-01-04 NOTE — PLAN OF CARE
Goal Outcome Evaluation:  Plan of Care Reviewed With: patient        Progress: no change  Outcome Evaluation: pt remains aox1 to self this shift. falls precautions in place. pt had dialysis today, gave 1 liter of fluid to pt in dialysis. safety sitter remains in place. pt resting in bed after dialysis this shift. skin care provided. wound care to left lower leg to be performed tonight as family is wanting pt to rest now. VSS currently. BG monitored as ordered.

## 2025-01-04 NOTE — PROGRESS NOTES
[facility]     Nephrology Progress Note      Patient Name: Nelson Wang  : 1945  MRN: [id number]  Primary Care Physician:  Domingo Bravo MD  Date of admission: 2024    Subjective   Subjective     Interval History:  Pt seen on dialysis today, no problems  More somnolent today.  No distress but seems restless.  Tolerating some p.o.      Objective   Objective     Vitals:   Temp:  [97.3 °F (36.3 °C)-98.7 °F (37.1 °C)] 97.3 °F (36.3 °C)  Heart Rate:  [] 110  Resp:  [16-20] 18  BP: (113-149)/(47-76) 113/47  Flow (L/min) (Oxygen Therapy):  [2-235] 2  Physical Exam:   constitutional: Awake, alert, confused, hard of hearing, mildly restless.    Eyes: sclerae anicteric, no conjunctival injection   HENT: mucous membranes moist.     Neck: Supple, no thyromegaly, no lymphadenopathy, trachea midline, No JVD   Respiratory: Decreased to auscultation bilaterally, nonlabored respirations.   Cardiovascular: RRR, no murmurs, rubs, or gallops.   Gastrointestinal: Positive bowel sounds, soft,  nondistended   Musculoskeletal: No edema, no clubbing or cyanosis.  Left upper extremity AV fistula, patent.   Neurologic: moving extremities, answering questions but inconsistently.  Incoherent at times.   Skin: warm and dry, no rashes, areas of hypopigmentation.    Result Review    Result Reviewed:  I have personally reviewed the results from the time of this admission to 2025 08:58 EST and agree with these findings:  [x]  Laboratory  []  Microbiology  [x]  Radiology  []  EKG/Telemetry   []  Cardiology/Vascular   []  Pathology  [x]  Old records  []  Other:        Lab 25  0900 25  0559 25  0544 24  0521 24  0538 24  0921   SODIUM 132* 140 135* 134* 132* 134*   POTASSIUM 4.8 5.0 4.5 5.3* 4.5 4.3   CHLORIDE 97* 102 96* 97* 94* 93*   CO2 22.3 29.4* 28.1 22.9 23.0 22.3   BUN 25* 38* 23 38* 25* 32*   CREATININE 3.34* 5.94* 4.28* 7.05* 5.37* 6.16*   GLUCOSE 140* 118* 128* 101* 120*  174*   EGFR 18.1* 9.1* 13.5* 7.4* 10.3* 8.7*   ANION GAP 12.7 8.6 10.9 14.1 15.0 18.7*   MAGNESIUM  --   --   --   --   --  2.1   PHOSPHORUS 2.5  --   --   --  4.5 5.8*           Assessment & Plan   Assessment / Plan       Active Hospital Problems:  Active Hospital Problems    Diagnosis     **Hypoxia     Gram-positive bacteremia     ESRD (end stage renal disease)     Essential hypertension     Type 2 diabetes mellitus without complication      Assessment and Plan:    - ESRD, was on home dialysis PTA, left upper extremity AV fistula for access.  Electrolytes are stable.  Dialysis TTS for now.  Now looking for rehab placement per notes.        - Aspiration pneumonia, and possibly recurrent aspiration, per pulmonary.      - Type 2 diabetes with complications.     - Hypotension blood pressure is acceptable now, on ProAmatine and blood pressure stable.     - Anemia of chronic kidney disease, on RONALD as outpatient.  Getting Epogen while here.     - Altered mentation.  Remains confused.  Unlikely uremia given consistent dialysis to this point.

## 2025-01-05 LAB
ALBUMIN SERPL-MCNC: 2.8 G/DL (ref 3.5–5.2)
ANION GAP SERPL CALCULATED.3IONS-SCNC: 9.3 MMOL/L (ref 5–15)
BUN SERPL-MCNC: 20 MG/DL (ref 8–23)
BUN/CREAT SERPL: 6.8 (ref 7–25)
CALCIUM SPEC-SCNC: 9.6 MG/DL (ref 8.6–10.5)
CHLORIDE SERPL-SCNC: 99 MMOL/L (ref 98–107)
CO2 SERPL-SCNC: 26.7 MMOL/L (ref 22–29)
CREAT SERPL-MCNC: 2.95 MG/DL (ref 0.76–1.27)
DEPRECATED RDW RBC AUTO: 65.4 FL (ref 37–54)
EGFRCR SERPLBLD CKD-EPI 2021: 21 ML/MIN/1.73
ERYTHROCYTE [DISTWIDTH] IN BLOOD BY AUTOMATED COUNT: 19.3 % (ref 12.3–15.4)
GLUCOSE BLDC GLUCOMTR-MCNC: 136 MG/DL (ref 70–99)
GLUCOSE BLDC GLUCOMTR-MCNC: 96 MG/DL (ref 70–99)
GLUCOSE SERPL-MCNC: 134 MG/DL (ref 65–99)
HCT VFR BLD AUTO: 24.3 % (ref 37.5–51)
HGB BLD-MCNC: 7 G/DL (ref 13–17.7)
MCH RBC QN AUTO: 26.7 PG (ref 26.6–33)
MCHC RBC AUTO-ENTMCNC: 28.8 G/DL (ref 31.5–35.7)
MCV RBC AUTO: 92.7 FL (ref 79–97)
PHOSPHATE SERPL-MCNC: 3.5 MG/DL (ref 2.5–4.5)
PLATELET # BLD AUTO: 192 10*3/MM3 (ref 140–450)
PMV BLD AUTO: 10.1 FL (ref 6–12)
POTASSIUM SERPL-SCNC: 4.5 MMOL/L (ref 3.5–5.2)
RBC # BLD AUTO: 2.62 10*6/MM3 (ref 4.14–5.8)
SODIUM SERPL-SCNC: 135 MMOL/L (ref 136–145)
WBC NRBC COR # BLD AUTO: 5.4 10*3/MM3 (ref 3.4–10.8)

## 2025-01-05 PROCEDURE — 94664 DEMO&/EVAL PT USE INHALER: CPT

## 2025-01-05 PROCEDURE — 94799 UNLISTED PULMONARY SVC/PX: CPT

## 2025-01-05 PROCEDURE — 97110 THERAPEUTIC EXERCISES: CPT

## 2025-01-05 PROCEDURE — 99233 SBSQ HOSP IP/OBS HIGH 50: CPT | Performed by: FAMILY MEDICINE

## 2025-01-05 PROCEDURE — 82948 REAGENT STRIP/BLOOD GLUCOSE: CPT

## 2025-01-05 PROCEDURE — 94761 N-INVAS EAR/PLS OXIMETRY MLT: CPT

## 2025-01-05 PROCEDURE — 80069 RENAL FUNCTION PANEL: CPT | Performed by: FAMILY MEDICINE

## 2025-01-05 PROCEDURE — 94668 MNPJ CHEST WALL SBSQ: CPT

## 2025-01-05 PROCEDURE — 97530 THERAPEUTIC ACTIVITIES: CPT

## 2025-01-05 PROCEDURE — 85027 COMPLETE CBC AUTOMATED: CPT | Performed by: FAMILY MEDICINE

## 2025-01-05 RX ORDER — AMOXICILLIN AND CLAVULANATE POTASSIUM 500; 125 MG/1; MG/1
1 TABLET, FILM COATED ORAL 2 TIMES DAILY
Status: COMPLETED | OUTPATIENT
Start: 2025-01-05 | End: 2025-01-07

## 2025-01-05 RX ADMIN — QUETIAPINE FUMARATE 12.5 MG: 25 TABLET ORAL at 06:40

## 2025-01-05 RX ADMIN — LEVOTHYROXINE SODIUM 175 MCG: 0.03 TABLET ORAL at 06:40

## 2025-01-05 RX ADMIN — IPRATROPIUM BROMIDE AND ALBUTEROL SULFATE 3 ML: 2.5; .5 SOLUTION RESPIRATORY (INHALATION) at 18:22

## 2025-01-05 RX ADMIN — QUETIAPINE FUMARATE 37.5 MG: 25 TABLET ORAL at 20:16

## 2025-01-05 RX ADMIN — Medication 2.5 MG: at 20:17

## 2025-01-05 RX ADMIN — BUDESONIDE 0.5 MG: 0.5 INHALANT ORAL at 06:22

## 2025-01-05 RX ADMIN — IPRATROPIUM BROMIDE AND ALBUTEROL SULFATE 3 ML: 2.5; .5 SOLUTION RESPIRATORY (INHALATION) at 22:44

## 2025-01-05 RX ADMIN — MIDODRINE HYDROCHLORIDE 10 MG: 10 TABLET ORAL at 13:08

## 2025-01-05 RX ADMIN — MIDODRINE HYDROCHLORIDE 10 MG: 10 TABLET ORAL at 17:18

## 2025-01-05 RX ADMIN — DICLOFENAC SODIUM 2 G: 10 GEL TOPICAL at 10:54

## 2025-01-05 RX ADMIN — IPRATROPIUM BROMIDE AND ALBUTEROL SULFATE 3 ML: 2.5; .5 SOLUTION RESPIRATORY (INHALATION) at 06:22

## 2025-01-05 RX ADMIN — ARFORMOTEROL TARTRATE 15 MCG: 15 SOLUTION RESPIRATORY (INHALATION) at 06:22

## 2025-01-05 RX ADMIN — WHITE PETROLATUM 1 APPLICATION: 1.75 OINTMENT TOPICAL at 09:07

## 2025-01-05 RX ADMIN — IPRATROPIUM BROMIDE AND ALBUTEROL SULFATE 3 ML: 2.5; .5 SOLUTION RESPIRATORY (INHALATION) at 11:24

## 2025-01-05 RX ADMIN — MIDODRINE HYDROCHLORIDE 10 MG: 10 TABLET ORAL at 06:40

## 2025-01-05 RX ADMIN — ACETAMINOPHEN 650 MG: 325 TABLET ORAL at 23:25

## 2025-01-05 RX ADMIN — GUAIFENESIN 100 MG: 100 LIQUID ORAL at 20:29

## 2025-01-05 RX ADMIN — BUDESONIDE 0.5 MG: 0.5 INHALANT ORAL at 18:22

## 2025-01-05 RX ADMIN — AMOXICILLIN AND CLAVULANATE POTASSIUM 500 MG: 500; 125 TABLET, FILM COATED ORAL at 20:16

## 2025-01-05 RX ADMIN — ACETAMINOPHEN 650 MG: 325 TABLET ORAL at 10:54

## 2025-01-05 RX ADMIN — ACETAMINOPHEN 650 MG: 325 TABLET ORAL at 17:19

## 2025-01-05 RX ADMIN — IPRATROPIUM BROMIDE AND ALBUTEROL SULFATE 3 ML: 2.5; .5 SOLUTION RESPIRATORY (INHALATION) at 15:23

## 2025-01-05 RX ADMIN — ARFORMOTEROL TARTRATE 15 MCG: 15 SOLUTION RESPIRATORY (INHALATION) at 18:22

## 2025-01-05 RX ADMIN — AZITHROMYCIN 500 MG: 250 TABLET, FILM COATED ORAL at 09:07

## 2025-01-05 RX ADMIN — FAMOTIDINE 10 MG: 20 TABLET, FILM COATED ORAL at 09:07

## 2025-01-05 NOTE — PLAN OF CARE
Goal Outcome Evaluation:  Plan of Care Reviewed With: patient        Progress: no change             Blood pressure monitored during the night. B/p maintaining WNL. Pt is getting scheduled midodrine. Pt has continuous pulse oximetry. Pt is removing oxygen and oxygen level is decreasing and repetitively reminding pt to keep oxygen in place. Educating pt. With oxygen 2 liters nasal cannula it is 92-96%. Pt does move extremeties  however requires repositioning by staff. Pt confused. Speech is clear. Pt knew was in the hospital. Disoriented to time and situation. Pt removed iv. Attempt to restart iv however pt was confused stating staff was going to do surgery to remove ears., attempted to educate pt as to trying to put iv in to be able to get iv medicine. Pt refused for iv to be placed.. pt did allow treatment to be done to areas on lower legs.

## 2025-01-05 NOTE — PROGRESS NOTES
Psychiatric     Nephrology Progress Note      Patient Name: Nelson Wang  : 1945  MRN: [id number]  Primary Care Physician:  Domingo Bravo MD  Date of admission: 2024    Subjective   Subjective     Interval History:  no problems  More somnolent today.  No distress but seems restless.  Tolerating some p.o.      Objective   Objective     Vitals:   Temp:  [97.3 °F (36.3 °C)-98 °F (36.7 °C)] 97.9 °F (36.6 °C)  Heart Rate:  [] 93  Resp:  [12-22] 20  BP: ()/(30-87) 135/71  Flow (L/min) (Oxygen Therapy):  [2] 2  Physical Exam:   constitutional: Awake, alert, confused, hard of hearing, mildly restless.    Eyes: sclerae anicteric, no conjunctival injection   HENT: mucous membranes moist.     Neck: Supple, no thyromegaly, no lymphadenopathy, trachea midline, No JVD   Respiratory: Decreased to auscultation bilaterally, nonlabored respirations.   Cardiovascular: RRR, no murmurs, rubs, or gallops.   Gastrointestinal: Positive bowel sounds, soft,  nondistended   Musculoskeletal: No edema, no clubbing or cyanosis.  Left upper extremity AV fistula, patent.   Neurologic: moving extremities, answering questions but inconsistently.  Incoherent at times.   Skin: warm and dry, no rashes, areas of hypopigmentation.    Result Review    Result Reviewed:  I have personally reviewed the results from the time of this admission to 2025 07:35 EST and agree with these findings:  [x]  Laboratory  []  Microbiology  [x]  Radiology  []  EKG/Telemetry   []  Cardiology/Vascular   []  Pathology  [x]  Old records  []  Other:        Lab 25  0844 25  0900 25  0559 25  0544 24  0521 24  0538   SODIUM 138 132* 140 135* 134* 132*   POTASSIUM 4.4 4.8 5.0 4.5 5.3* 4.5   CHLORIDE 98 97* 102 96* 97* 94*   CO2 28.7 22.3 29.4* 28.1 22.9 23.0   BUN 43* 25* 38* 23 38* 25*   CREATININE 4.93* 3.34* 5.94* 4.28* 7.05* 5.37*   GLUCOSE 132* 140* 118* 128* 101* 120*   EGFR 11.4* 18.1* 9.1* 13.5* 7.4*  10.3*   ANION GAP 11.3 12.7 8.6 10.9 14.1 15.0   PHOSPHORUS 5.3* 2.5  --   --   --  4.5           Assessment & Plan   Assessment / Plan       Active Hospital Problems:  Active Hospital Problems    Diagnosis     **Hypoxia     Gram-positive bacteremia     ESRD (end stage renal disease)     Essential hypertension     Type 2 diabetes mellitus without complication      Assessment and Plan:    - ESRD, was on home dialysis PTA, left upper extremity AV fistula for access.  Electrolytes are stable.  Dialysis TTS for now.  Now looking for rehab placement per notes.        - Aspiration pneumonia, and possibly recurrent aspiration, per pulmonary.      - Type 2 diabetes with complications.     - Hypotension blood pressure is acceptable now, on ProAmatine and blood pressure stable.     - Anemia of chronic kidney disease, on RONALD as outpatient.  Getting Epogen while here.     - Altered mentation.  Remains confused.  Unlikely uremia given consistent dialysis to this point.                            Uncontrolled type 2 diabetes mellitus with insulin therapy Uncontrolled type 2 diabetes mellitus with insulin therapy Uncontrolled type 2 diabetes mellitus with insulin therapy Uncontrolled type 2 diabetes mellitus with insulin therapy

## 2025-01-05 NOTE — PLAN OF CARE
Goal Outcome Evaluation:  Plan of Care Reviewed With: patient, child, family        Progress: no change  Outcome Evaluation: fall percautions in place, safety sitter at bedside. wound/skin care provided per orders. weaned down O2 to 1 liter on nasal cannula, O2 sats maintaining at 95%. no IV access, MD approved to leave out. up in wheelchair in front lobby. daughter requested PRN tylenol to be given q6 routinely for arthritic pains. voltaren gel offered and applied to ilia knees. no issues noted at this time, coninue plan of care.

## 2025-01-05 NOTE — THERAPY TREATMENT NOTE
Acute Care - Physical Therapy Treatment Note  BRIA Sullivan     Patient Name: Nelson Wang  : 1945  MRN: [id number]  Today's Date: 2025      Visit Dx:     ICD-10-CM ICD-9-CM   1. Acute respiratory failure with hypoxia  J96.01 518.81   2. Acute pulmonary edema  J81.0 518.4   3. Difficulty walking  R26.2 719.7   4. Oropharyngeal dysphagia  R13.12 787.22     Patient Active Problem List   Diagnosis    Essential hypertension    Bradycardia, sinus    Anemia due to chronic kidney disease    Hypothyroidism    Idiopathic gout    Proteinuria    Psoriasis    Thrombocytopenia    Type 2 diabetes mellitus without complication    CKD (chronic kidney disease), stage IV    Hyperlipidemia    Monoclonal gammopathy of unknown significance (MGUS)    Stage 5 chronic kidney disease    Chronic gout without tophus    Peritoneal dialysis catheter dysfunction    Medicare annual wellness visit, subsequent    Chronic cough    Peritonitis associated with peritoneal dialysis    Recurrent pneumonia    Acute on chronic respiratory failure with hypoxia    ESRD (end stage renal disease)    Aspiration pneumonitis    Sepsis    Type 2 diabetes mellitus, with long-term current use of insulin    GERD without esophagitis    Immunosuppression due to drug therapy    Bipolar disorder    Chronic respiratory failure with hypoxia    Shortness of breath    Abnormal stress test    ESRD on dialysis    Abnormal chest CT    Encounter for screening for malignant neoplasm of colon    History of colon polyps    Family history of colon cancer    Intractable pain    Abdominal pain    ESRD (end stage renal disease) on dialysis    AMS (altered mental status)    Hallucinations    LUQ pain    Discitis    Metabolic encephalopathy    Aspiration pneumonia    Discitis of lumbar region    Severe malnutrition    Dementia    Unspecified severe protein-calorie malnutrition    Hypoxia    Gram-positive bacteremia     Past Medical History:   Diagnosis Date    Abnormal stress  test     Anemia     IRON INFUSIONS WITH DIALYSIS    Anesthesia     WITH HIP REPLACEMENT DAUGHTER BELIEVES HAD SVT 2000    Ankle pain, right     Anxiety and depression     Arthritis     CKD (chronic kidney disease), stage IV     DIALYSIS [id number] IN RIGHT CHEST    Diabetes     GERD (gastroesophageal reflux disease)     Gout     High cholesterol     History of peritoneal dialysis     HL (hearing loss)     Hyperkalemia     Hypertension     Hypothyroidism     Insomnia     Night terrors     Psoriasis     Psoriasis     Sleep apnea     Sleep walking     Thrombocytopenia     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    Vitiligo      Past Surgical History:   Procedure Laterality Date    ABDOMINAL SURGERY      ANKLE SURGERY Right 11/1990    APPENDECTOMY N/A 1954    ARTERIOVENOUS FISTULA/SHUNT SURGERY Left 07/16/2024    Procedure: Creation of left arm arteriovenous fistula;  Surgeon: Moses Mir MD;  Location: [facility] MAIN OR;  Service: Vascular;  Laterality: Left;    BRONCHOSCOPY N/A 03/01/2024    Procedure: BRONCHOSCOPY WITH BAL AND WASHINGS;  Surgeon: Sandra Espinal MD;  Location: [facility] ENDOSCOPY;  Service: Pulmonary;  Laterality: N/A;  PNEUMONIA    BRONCHOSCOPY N/A 08/30/2024    Procedure: BRONCHOSCOPY WITH BALS AND WASHINGS;  Surgeon: Sandra Espinal MD;  Location: [facility] ENDOSCOPY;  Service: Pulmonary;  Laterality: N/A;  MUCOUS PLUGGING    CARDIAC CATHETERIZATION N/A 05/29/2024    Procedure: Left Heart Cath with possible coronary angioplasty;  Surgeon: Stephan Nichols MD;  Location: [facility] CATH INVASIVE LOCATION;  Service: Cardiology;  Laterality: N/A;    COLONOSCOPY N/A 06/2022    [facility]    ENDOSCOPY N/A 10/28/2024    Procedure: ESOPHAGOGASTRODUODENOSCOPY INSERTION OF LIGHTED INSTRUMENT TO VIEW ESOPHAGUS, STOMACH AND SMALL INTESTINE;  Surgeon: Kingsley Peraza MD;  Location: [facility] ENDOSCOPY;  Service: Gastroenterology;  Laterality: N/A;  Gastritis    FRACTURE SURGERY      HIP BIPOLAR  REPLACEMENT Right 01/2000    INSERTION HEMODIALYSIS CATHETER Left 04/09/2024    Procedure: HEMODIALYSIS CATHETER INSERTION;  Surgeon: Jose Berry MD;  Location[facility] MA[facility] OR;  Service: General;  Laterality: Left;    INSERTION HEMODIALYSIS CATHETER N/A 04/12/2024    Procedure: Tunneled hemodialysis catheter insertion;  Surgeon: Enrique Vinson MD;  Location: [facility] MAIN OR;  Service: Vascular;  Laterality: N/A;    INSERTION PERITONEAL DIALYSIS CATHETER N/A 03/27/2023    Procedure: LAPAROSCOPIC INSERTION PERITONEAL DIALYSIS CATHETER, LAPAROSCOPIC OMENTOPEXY WITH LYSIS OF ADHESIONS;  Surgeon: Jose Berry MD;  [facility];  Service: General;  Laterality: N/A;    INSERTION PERITONEAL DIALYSIS CATHETER Left 07/23/2023    Procedure: REVISION OF PERITONEAL DIALYSIS CATHETER;  Surgeon: Radha Oreilly MD;  Location: [facility] MAIN OR;  Service: General;  Laterality: Left;    JOINT REPLACEMENT      REMOVAL PERITONEAL DIALYSIS CATHETER N/A 04/09/2024    Procedure: REMOVAL PERITONEAL DIALYSIS CATHETER;  Surgeon: Jose Berry MD;  Location: [facility] MAIN OR;  Service: General;  Laterality: N/A;    RENAL BIOPSY Left 07/15/2022    UPPER GASTROINTESTINAL ENDOSCOPY      VASCULAR SURGERY      WRIST SURGERY      UNSURE WHICH SIDE DAUGHTER REPORTS HAD SEVERE  CUT FROM WINDOW AND THEY HAD TO DO RECONSTRUCTIVE SURGERY WITH VESSELS AND NERVES     PT Assessment (Last 12 Hours)       PT Evaluation and Treatment       Row Name 01/05/25 0902          Physical Therapy Time and Intention    Subjective Information no complaints  -DK     Document Type therapy note (daily note)  -DK     Mode of Treatment individual therapy;physical therapy  -DK     Patient Effort good  -DK     Symptoms Noted During/After Treatment none  -DK     Comment Pt appears confused, resisting some exercises, resisting several attempts at supine to sit transfers. Pt kept saying the right leg was broken.  He was advised  multiple times that all x-rays were (-).  -       Row Name 01/05/25 0902          Pain    Pretreatment Pain Rating 0/10 - no pain  -DK     Posttreatment Pain Rating 0/10 - no pain  -       Row Name 01/05/25 0902          Cognition    Affect/Mental Status (Cognition) confused;low arousal/lethargic  -DK     Orientation Status (Cognition) oriented to;person  -DK     Follows Commands (Cognition) physical/tactile prompts required;repetition of directions required;verbal cues/prompting required  -DK     Cognitive Function (Cognition) attention deficit  -DK     Attention Deficit (Cognition) minimal deficit;moderate deficit;concentration;focused/sustained attention;arousal/alertness  -DK     Personal Safety Interventions nonskid shoes/slippers when out of bed;supervised activity;gait belt  -       Row Name 01/05/25 0902          Motor Skills    Motor Skills --  therapeutic exercises  -     Therapeutic Exercise hip;knee;ankle  -     Additional Documentation --  No transfers due to pt resisting supine to sit on several attempts.  -       Row Name 01/05/25 0902          Hip (Therapeutic Exercise)    Hip (Therapeutic Exercise) AAROM (active assistive range of motion)  -     Hip AAROM (Therapeutic Exercise) bilateral;flexion;extension;aBduction;aDduction;supine;10 repetitions;2 sets  -       Row Name 01/05/25 0902          Knee (Therapeutic Exercise)    Knee (Therapeutic Exercise) AAROM (active assistive range of motion)  -     Knee AAROM (Therapeutic Exercise) bilateral;flexion;extension;supine;10 repetitions;2 sets  -       Row Name 01/05/25 0902          Ankle (Therapeutic Exercise)    Ankle (Therapeutic Exercise) AAROM (active assistive range of motion)  -     Ankle AAROM (Therapeutic Exercise) bilateral;dorsiflexion;plantarflexion;supine;10 repetitions;2 sets  -       Row Name             Wound 11/15/24 1945 Right upper chest    Wound - Properties Group Placement Date: 11/15/24  -JR Placement Time:  1945  -JR Side: Right  -JR Orientation: upper  -JR Location: chest  -JR Primary Wound Type: Incision  -JR    Retired Wound - Properties Group Placement Date: 11/15/24  -JR Placement Time: 1945 -JR Side: Right  -JR Orientation: upper  -JR Location: chest  -JR Primary Wound Type: Incision  -JR    Retired Wound - Properties Group Placement Date: 11/15/24  -JR Placement Time: 1945 -JR Side: Right  -JR Orientation: upper  -JR Location: chest  -JR Primary Wound Type: Incision  -JR    Retired Wound - Properties Group Date first assessed: 11/15/24  -JR Time first assessed: 1945 -JR Side: Right  -JR Location: chest  -JR Primary Wound Type: Incision  -JR      Row Name             Wound 12/05/24 2331 Right lower leg abrasion    Wound - Properties Group Placement Date: 12/05/24  -AW Placement Time: 2331 -AW Side: Right  -AW Orientation: lower  -AW Location: leg  -AW Primary Wound Type: Abrasion  -AW Type: abrasion  -AW Present on Original Admission: Y  -AW    Retired Wound - Properties Group Placement Date: 12/05/24  -AW Placement Time: 2331 -AW Present on Original Admission: Y  -AW Side: Right  -AW Orientation: lower  -AW Location: leg  -AW Primary Wound Type: Abrasion  -AW Type: abrasion  -AW    Retired Wound - Properties Group Placement Date: 12/05/24  -AW Placement Time: 2331 -AW Present on Original Admission: Y  -AW Side: Right  -AW Orientation: lower  -AW Location: leg  -AW Primary Wound Type: Abrasion  -AW Type: abrasion  -AW    Retired Wound - Properties Group Date first assessed: 12/05/24  -AW Time first assessed: 2331  -AW Present on Original Admission: Y  -AW Side: Right  -AW Location: leg  -AW Primary Wound Type: Abrasion  -AW Type: abrasion  -AW      Row Name             Wound 12/06/24 1440 Left lower leg skin tear    Wound - Properties Group Placement Date: 12/06/24  -KE Placement Time: 1440  -KE Side: Left  -KE Orientation: lower  -KE Location: leg  -KE Primary Wound Type: Skin tear  -KE Type: skin tear   -KE    Retired Wound - Properties Group Placement Date: 12/06/24  -KE Placement Time: 1440  -KE Side: Left  -KE Orientation: lower  -KE Location: leg  -KE Primary Wound Type: Skin tear  -KE Type: skin tear  -KE    Retired Wound - Properties Group Placement Date: 12/06/24  -KE Placement Time: 1440  -KE Side: Left  -KE Orientation: lower  -KE Location: leg  -KE Primary Wound Type: Skin tear  -KE Type: skin tear  -KE    Retired Wound - Properties Group Date first assessed: 12/06/24  -KE Time first assessed: 1440  -KE Side: Left  -KE Location: leg  -KE Primary Wound Type: Skin tear  -KE Type: skin tear  -KE      Row Name             Wound 12/28/24 2100 Right posterior elbow skin tear    Wound - Properties Group Placement Date: 12/28/24  -KM Placement Time: 2100  -KM Side: Right  -KM Orientation: posterior  -KM Location: elbow  -KM Primary Wound Type: Skin tear  -KM Type: skin tear  -KM    Retired Wound - Properties Group Placement Date: 12/28/24  -KM Placement Time: 2100  -KM Side: Right  -KM Orientation: posterior  -KM Location: elbow  -KM Primary Wound Type: Skin tear  -KM Type: skin tear  -KM    Retired Wound - Properties Group Placement Date: 12/28/24  -KM Placement Time: 2100  -KM Side: Right  -KM Orientation: posterior  -KM Location: elbow  -KM Primary Wound Type: Skin tear  -KM Type: skin tear  -KM    Retired Wound - Properties Group Date first assessed: 12/28/24  -KM Time first assessed: 2100  -KM Side: Right  -KM Location: elbow  -KM Primary Wound Type: Skin tear  -KM Type: skin tear  -KM      Row Name 01/05/25 0902          Plan of Care Review    Plan of Care Reviewed With patient  -DK     Progress no change  -DK       Row Name 01/05/25 0902          Positioning and Restraints    Pre-Treatment Position in bed  -DK     Post Treatment Position bed  -DK     In Bed supine;call light within reach;encouraged to call for assist;exit alarm on;patient within staff view;legs elevated;heels elevated  side rails up x 4   -DK               User Key  (r) = Recorded By, (t) = Taken By, (c) = Cosigned By      Initials Name Provider Type    JR Stan Henderson, RN Registered Nurse    Jackelin Freitas, RN Registered Nurse    Neena Weaver, RN Registered Nurse    Farzana Ma RN Registered Nurse    Lia Canales, GREER Physical Therapist Assistant                    Physical Therapy Education       Title: PT OT SLP Therapies (In Progress)       Topic: Physical Therapy (In Progress)       Point: Mobility training (Done)       Learning Progress Summary            Patient Acceptance, E,TB, VU by AV at 12/17/2024 1454                      Point: Home exercise program (Not Started)       Learner Progress:  Not documented in this visit.              Point: Body mechanics (Done)       Learning Progress Summary            Patient Acceptance, E,TB, VU by AV at 12/17/2024 1454                      Point: Precautions (Done)       Learning Progress Summary            Patient Acceptance, E,TB, VU by AV at 12/17/2024 1454                                      User Key       Initials Effective Dates Name Provider Type Discipline    AV 06/11/21 -  Kaushik Chavez, PT Physical Therapist PT                  PT Recommendation and Plan  Planned Therapy Interventions (PT): balance training, bed mobility training, gait training, strengthening, transfer training  Therapy Frequency (PT): daily  Progress Summary (PT)  Progress Toward Functional Goals (PT): progress toward functional goals is good  Plan of Care Reviewed With: patient  Progress: no change   Outcome Measures       Row Name 01/05/25 0902 01/03/25 1500          How much help from another person do you currently need...    Turning from your back to your side while in flat bed without using bedrails? 2  -DK 2  -TC     Moving from lying on back to sitting on the side of a flat bed without bedrails? 2  -DK 2  -TC     Moving to and from a bed to a chair (including a wheelchair)? 2  -DK 2  -TC      Standing up from a chair using your arms (e.g., wheelchair, bedside chair)? 2  -DK 2  -TC     Climbing 3-5 steps with a railing? 1  -DK 1  -TC     To walk in hospital room? 1  -DK 2  -TC     AM-PAC 6 Clicks Score (PT) 10  -DK 11  -TC        Functional Assessment    Outcome Measure Options AM-PAC 6 Clicks Basic Mobility (PT)  -DK AM-PAC 6 Clicks Daily Activity (OT)  -TC               User Key  (r) = Recorded By, (t) = Taken By, (c) = Cosigned By      Initials Name Provider Type    Lia Canales PTA Physical Therapist Assistant    TC Maile Dueñas, PT Physical Therapist                     Time Calculation:    PT Charges       Row Name 01/05/25 0906             Time Calculation    PT Received On 01/05/25  -DK      PT Goal Re-Cert Due Date 01/12/25  -DK         Timed Charges    95157 - PT Therapeutic Exercise Minutes 14  -DK      78344 - PT Therapeutic Activity Minutes 11  -DK         Total Minutes    Timed Charges Total Minutes 25  -DK       Total Minutes 25  -DK                User Key  (r) = Recorded By, (t) = Taken By, (c) = Cosigned By      Initials Name Provider Type    Lia Canales PTA Physical Therapist Assistant                  Therapy Charges for Today       Code Description Service Date Service Provider Modifiers Qty    13443903600 HC PT THER PROC EA 15 MIN 1/5/2025 Lia Escamilla PTA GP 1    32686008486 HC PT THERAPEUTIC ACT EA 15 MIN 1/5/2025 Lia Escamilla PTA GP 1            PT G-Codes  Outcome Measure Options: AM-PAC 6 Clicks Basic Mobility (PT)  AM-PAC 6 Clicks Score (PT): 10  AM-PAC 6 Clicks Score (OT): 7    Lia Escamilla PTA  1/5/2025

## 2025-01-05 NOTE — PROGRESS NOTES
[facility]   Hospitalist Progress Note  Date: 2025  Patient Name: Nelson Wang  : 1945  MRN: [id number]  Date of admission: 2024      Subjective   Subjective     Chief Complaint: Follow-up shortness of breath    Summary:Nelson Wang is a 79 y.o. 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 Patient was started on supplemental oxygen to maintain his oxygen saturations. He was given a dose of Bumex overnight. His chest x-ray was consistent with bilateral pleural effusions. Pulmonology was consulted for the bilateral pleural effusions and and nephrology consulted for possible volume overload. Pulmonology was performing a thoracentesis this morning. Thoracentesis was showing bloody fluid. Patient suffered CODE BLUE. Likely bleeding into his airways causing hypoxic arrest. He received CPR for 6 minutes and achieved ROSC. He was transferred to the ICU, intubated and on blood pressure support. Bronchoscopy was showing blood clots in his airways. Patient was started on CRRT. Patient's medical status is tenuous. He is currently on 1 pressor. CRRT is removing fluid at a slow rate. Patient started on micafungin on 2024. Patient extubated on 2024. Patient on high flow nasal cannula 30 L 25%. Slowly weaning down on supplemental oxygen, breathing more comfortably. Poor mental status still waxing and waning suspect underlying dementia with history of alcohol abuse, mixed with hospital delirium and hyperactive delirium.  Patient continues on hemodialysis with midodrine on dialysis days has required extra Seroquel and occasional as needed medications for sedation to allow him to tolerate hemodialysis.  Working on mentation and initially trying to get back home though patient had a fall on the evening of 2024 and has had difficulty with  transfers and ambulation since.  No fractures on radiographs.  Family considering discharge to inpatient rehab.    Interval Followup: Patient laying in bed appears to be resting fairly comfortably with sitter at the bedside.  Patient pleasant more alert today.  Patient pulled IV and refusing replacement overnight.  Afebrile overnight.  Heart rate within normal limits.  Blood pressure low overnight responded to IV fluids.  Started on scheduled midodrine.  Patient requiring 2 L nasal cannula keep sats greater than 90%.  Desats to the mid 80s on room air.   Hemoglobin trending down.    No other issues per nursing.    Review of Systems  Constitutional: Negative for fatigue and fever.   HENT: Negative for sore throat and positive trouble swallowing.    Eyes: Negative for pain and discharge.   Respiratory: Positive for cough and shortness of breath.    Cardiovascular: Negative for chest pain and palpitations.   Gastrointestinal: Negative for abdominal pain, nausea and vomiting.   Endocrine: Negative for cold intolerance and heat intolerance.   Genitourinary: Negative for difficulty urinating and dysuria.   Musculoskeletal: Negative for back pain and neck stiffness.   Skin: Negative for color change and rash.   Neurological: Negative for syncope and headaches.   Hematological: Negative for adenopathy.   Psychiatric/Behavioral: Positive for confusion and negative hallucinations.    Objective   Objective     Vitals:   Temp:  [97.3 °F (36.3 °C)-98 °F (36.7 °C)] 97.7 °F (36.5 °C)  Heart Rate:  [] 87  Resp:  [12-20] 20  BP: (100-139)/(35-87) 138/57  Flow (L/min) (Oxygen Therapy):  [2] 2  Physical Exam   General: well-developed appearing stated age in no acute distress  HEENT: Normocephalic atraumatic moist membranes pupils equal round reactive light, no scleral icterus no conjunctival injection  Cardiovascular: Irregularly irregular, no lower extremity edema appreciated  Pulmonary: Bibasilar crackles no wheezes or rhonchi  symmetric chest expansion, unlabored, no conversational dyspnea appreciated  Gastrointestinal: Soft nontender nondistended positive bowel sounds all 4 quadrants no rebound or guarding  Musculoskeletal: No clubbing cyanosis, warm and well-perfused, calves soft symmetric nontender bilaterally  Skin: Clean dry without rashes  Neuro: Cranial nerves II through XII intact grossly no sensorimotor deficits appreciated bilateral upper and lower extremities  Psych: Patient is calm cooperative and appropriate with exam not responding to internal stimuli  : No Kolb catheter no bladder distention no suprapubic tenderness    Result Review    Result Review:  I have personally reviewed these results and agree with these findings:  [x]  Laboratory  LAB RESULTS:      Lab 01/05/25  1002 01/04/25  0844 01/03/25  0900 01/02/25  0559 01/01/25  0544   WBC 5.40 6.06 5.18 7.11 9.03   HEMOGLOBIN 7.0* 7.1* 7.5* 7.8* 8.2*   HEMATOCRIT 24.3* 24.5* 26.9* 27.5* 28.1*   PLATELETS 192 199 179 194 206   MCV 92.7 90.7 93.4 92.6 90.9         Lab 01/05/25  1002 01/04/25  0844 01/03/25  0900 01/02/25  0559 01/01/25  0544 12/31/24  0521 12/30/24  0538   SODIUM 135* 138 132* 140 135*   < > 132*   POTASSIUM 4.5 4.4 4.8 5.0 4.5   < > 4.5   CHLORIDE 99 98 97* 102 96*   < > 94*   CO2 26.7 28.7 22.3 29.4* 28.1   < > 23.0   ANION GAP 9.3 11.3 12.7 8.6 10.9   < > 15.0   BUN 20 43* 25* 38* 23   < > 25*   CREATININE 2.95* 4.93* 3.34* 5.94* 4.28*   < > 5.37*   EGFR 21.0* 11.4* 18.1* 9.1* 13.5*   < > 10.3*   GLUCOSE 134* 132* 140* 118* 128*   < > 120*   CALCIUM 9.6 9.8 9.4 9.9 9.9   < > 9.9   PHOSPHORUS 3.5 5.3* 2.5  --   --   --  4.5    < > = values in this interval not displayed.         Lab 01/05/25  1002 01/04/25  0844 01/03/25  0900 12/30/24  0538   ALBUMIN 2.8* 3.0* 2.5* 3.1*         Lab 01/03/25  0900   PROBNP 19,861.0*                 Brief Urine Lab Results  (Last result in the past 365 days)        Color   Clarity   Blood   Leuk Est   Nitrite   Protein    CREAT   Urine HCG        10/27/24 1417 Yellow   Cloudy   Negative   Trace   Negative   >=300 mg/dL (3+)                 Microbiology Results (last 10 days)       Procedure Component Value - Date/Time    Blood Culture - Blood, Arm, Right [[id number]]  (Normal) Collected: 01/02/25 2157    Lab Status: Preliminary result Specimen: Blood from Arm, Right Updated: 01/04/25 2215     Blood Culture No growth at 2 days    Narrative:      Less than seven (7) mL's of blood was collected.  Insufficient quantity may yield false negative results.    Blood Culture - Blood, Arm, Right [[id number]]  (Normal) Collected: 01/02/25 2157    Lab Status: Preliminary result Specimen: Blood from Arm, Right Updated: 01/04/25 2215     Blood Culture No growth at 2 days    Narrative:      Less than seven (7) mL's of blood was collected.  Insufficient quantity may yield false negative results.    Respiratory Panel PCR w/COVID-19(SARS-CoV-2) RA/[facility] In-House, NP Swab in UTM/VTM, 2 HR TAT - Swab, Nasopharynx [[id number]]  (Normal) Collected: 01/02/25 2117    Lab Status: Final result Specimen: Swab from Nasopharynx Updated: 01/02/25 2225     ADENOVIRUS, PCR Not Detected     Coronavirus 229E Not Detected     Coronavirus HKU1 Not Detected     Coronavirus NL63 Not Detected     Coronavirus OC43 Not Detected     COVID19 Not Detected     Human Metapneumovirus Not Detected     Human Rhinovirus/Enterovirus Not Detected     Influenza A PCR Not Detected     Influenza B PCR Not Detected     Parainfluenza Virus 1 Not Detected     Parainfluenza Virus 2 Not Detected     Parainfluenza Virus 3 Not Detected     Parainfluenza Virus 4 Not Detected     RSV, PCR Not Detected     Bordetella pertussis pcr Not Detected     Bordetella parapertussis PCR Not Detected     Chlamydophila pneumoniae PCR Not Detected     Mycoplasma pneumo by PCR Not Detected    Narrative:      In the setting of a positive respiratory panel with a viral infection PLUS a negative  procalcitonin without other underlying concern for bacterial infection, consider observing off antibiotics or discontinuation of antibiotics and continue supportive care. If the respiratory panel is positive for atypical bacterial infection (Bordetella pertussis, Chlamydophila pneumoniae, or Mycoplasma pneumoniae), consider antibiotic de-escalation to target atypical bacterial infection.            [x]  Microbiology  [x]  Radiology  CT Chest Without Contrast Diagnostic    Result Date: 1/3/2025   1. Interval decrease in, if not resolution of, the left pleural effusion. Interval slight decrease in size of the right pleural effusion.  2. There may be interval increase in airspace opacification on the right. Decreased airspace disease is seen on the left.  3. There is moderate cardiomegaly, as before.  4. The study is motion-limited.  5. 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 These findings may contribute to the infectious process within the thorax, especially the pleural effusions. Empyema(s), especially on the right, cannot be excluded. No ectopic gas foci are seen in these areas. Paravertebral abscess or phlegmon, especially on the right, cannot be excluded. Thoracic spine MRI examination without and with intravenous gadolinium-based contrast agent (GBCA) administration may be helpful in further imaging assessment if clinically warranted and if not contraindicated.  6. Please see above comments for further detail.    Portions of this note were completed with a voice recognition program.  Electronically Signed By-Stan Hogan MD On:1/3/2025 1:24 AM      XR Chest 1 View    Result Date: 1/1/2025  Impression: The nasogastric tube has been removed. Otherwise no significant change from previous examination. Low lung volumes, cardiomegaly, small right pleural effusion and patchy bibasilar airspace opacities,  atelectasis versus pneumonia. Electronically Signed: Solange Iverson MD  1/1/2025 2:26 PM EST  Workstation ID: [id number]    CT Head Without Contrast    Result Date: 12/30/2024  Impression: 1. No intracranial hemorrhage or acute intracranial abnormality.. 2. Generalized cerebral atrophy with moderate white matter findings of chronic microvascular disease. Electronically Signed: Lasha Ramos MD  12/30/2024 10:38 PM EST  Workstation ID: [id number]    XR Ankle 2 View Right    Result Date: 12/30/2024  Impression: 1. Negative for acute fracture. 2. Stable chronic findings above. Electronically Signed: Lasha Ramos MD  12/30/2024 3:29 PM EST  Workstation ID: [id number]    CT Abdomen Pelvis Without Contrast    Result Date: 12/29/2024  Impression: CT scan of the abdomen and pelvis without contrast demonstrating small right effusion, slightly larger. Consolidation and/or atelectasis in the right lower lobe appears slightly worsened. 1.1 cm nonobstructing stone in the right renal pelvis is unchanged. The kidneys have an atrophic appearance. Pancolonic diverticulosis without diverticulitis. Chronic compression fractures of T12 and L3 appear stable. Electronically Signed: Clifford Mooney MD  12/29/2024 6:13 PM EST  Workstation ID: [id number]    XR Knee 1 or 2 View Right    Result Date: 12/29/2024  Impression: Negative for displaced fracture or joint malalignment involving right femur or knee. Radiographically uncomplicated hip prosthesis. Electronically Signed: Lorne Bustamante MD  12/29/2024 11:11 AM EST  Workstation ID: [id number]    XR Femur 2 View Right    Result Date: 12/29/2024  Impression: Negative for displaced fracture or joint malalignment involving right femur or knee. Radiographically uncomplicated hip prosthesis. Electronically Signed: Lorne Bustamante MD  12/29/2024 11:11 AM EST  Workstation ID: [id number]    XR Elbow 2 View Right    Result Date: 12/28/2024  No acute fracture or acute malalignment is identified.   Portions of this  note were completed with a voice recognition program.  Electronically Signed By-Stan Hogan MD On:12/28/2024 11:33 PM       []  EKG/Telemetry   []  Cardiology/Vascular   []  Pathology  []  Old records  [x]  Other:  Scheduled Meds:arformoterol, 15 mcg, Nebulization, BID - RT  budesonide, 0.5 mg, Nebulization, BID - RT  cefTRIAXone, 1,000 mg, Intravenous, Q24H  famotidine, 10 mg, Oral, Daily  insulin lispro, 2-7 Units, Subcutaneous, BID AC  ipratropium-albuterol, 3 mL, Nebulization, Q4H While Awake - RT  levothyroxine, 175 mcg, Oral, Q AM  melatonin, 2.5 mg, Oral, Nightly  midodrine, 10 mg, Oral, Once per day on Tuesday Thursday Saturday  midodrine, 10 mg, Oral, TID AC  mineral oil-hydrophilic petrolatum, 1 Application, Topical, Daily  QUEtiapine, 37.5 mg, Oral, Nightly  sodium chloride, 10 mL, Intravenous, Q12H      Continuous Infusions:   PRN Meds:.  acetaminophen    albuterol    artificial tears    senna-docusate sodium **AND** polyethylene glycol **AND** [DISCONTINUED] bisacodyl **AND** bisacodyl    calamine    dextrose    dextrose    Diclofenac Sodium    glucagon (human recombinant)    guaifenesin    heparin (porcine)    heparin (porcine)    hydrocortisone ace-pramoxine    Lidocaine    ondansetron    Polyvinyl Alcohol-Povidone PF    QUEtiapine    sodium chloride    sodium chloride    sodium chloride    trolamine salicylate      Assessment & Plan   Assessment / Plan     Assessment/Plan:  Suspected aspiration pneumonia  Cardiac arrest secondary to hypoxemia in setting of thoracentesis  Acute hypoxic/hypercapnic respiratory failure requiring mechanical ventilation  Acute metabolic encephalopathy/altered mental status, initial concern for seizures, EEG negative x 2  Acute on chronic diastolic heart failure  Acute cardiogenic pulmonary edema  Bilateral pleural effusions  Hemoptysis  Candidemia s/p iv micafungin course completed 12/22/24  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  Atrial  fibrillation on Eliquis  ESRD on home HD   Type 2 diabetes  Mild nonobstructive CAD per 5/29/2024 cath  Suspect underlying dementia with episodes of possible delirium and hyperactive delirium  Fall on 12-28  Normocytic anemia  Concern for aspiration  Hypothyroidism          Patient admitted for further evaluation and treatment  Nephrology, psychiatry and pulmonology critical care consulted thank you for your assistance  Change Rocephin to Augmentin renally dosed complete 5-day course for coverage of aspiration pneumonia as patient refusing replacement of IV  Completed course of azithromycin  Continue scheduled DuoNebs  Continue supplemental oxygen titrate to keep sats greater than 90%  Continue to encourage pulmonary toilet  Continue bronchopulmonary hygiene protocol  Continue to encourage incentive spirometry  Continue aspiration precautions  Continue speech therapy  Downgrade diet to puréed with thin liquids  Continue Brovana, Pulmicort scheduled with as needed DuoNebs  Continue hemodialysis and ultrafiltration per nephrology  Schedule midodrine 10 mg p.o. 3 times daily and taper as able  Continue to hold Eliquis due to hemoptysis and hemothorax  Continue sliding scale insulin  Continue Seroquel 37.5 mg at night and titrate as needed  Continue regular reorientation  Continue to reinforce day night cycle  Continue physical therapy Occupational Therapy  Continue monitor anemia and transfuse as needed keep hemoglobin greater than 7  Continue levothyroxine  Further inpatient orders or recommendations pending clinical course         Discussed plan with bedside RN .    Disposition: Home with family and home health on Wednesday.  Discharge planning coordinating.      VTE Prophylaxis:  Pharmacologic & mechanical VTE prophylaxis orders are present.        CODE STATUS:   Level Of Support Discussed With: Next of Kin (If No Surrogate)  Code Status (Patient has no pulse and is not breathing): No CPR (Do Not Attempt to  Resuscitate)  Medical Interventions (Patient has pulse or is breathing): Full Support

## 2025-01-06 LAB
ABO GROUP BLD: NORMAL
ALBUMIN SERPL-MCNC: 2.6 G/DL (ref 3.5–5.2)
ANION GAP SERPL CALCULATED.3IONS-SCNC: 13.3 MMOL/L (ref 5–15)
BLD GP AB SCN SERPL QL: NEGATIVE
BUN SERPL-MCNC: 32 MG/DL (ref 8–23)
BUN/CREAT SERPL: 8.2 (ref 7–25)
CALCIUM SPEC-SCNC: 9.8 MG/DL (ref 8.6–10.5)
CHLORIDE SERPL-SCNC: 100 MMOL/L (ref 98–107)
CO2 SERPL-SCNC: 21.7 MMOL/L (ref 22–29)
CREAT SERPL-MCNC: 3.88 MG/DL (ref 0.76–1.27)
DEPRECATED RDW RBC AUTO: 65.7 FL (ref 37–54)
EGFRCR SERPLBLD CKD-EPI 2021: 15.1 ML/MIN/1.73
ERYTHROCYTE [DISTWIDTH] IN BLOOD BY AUTOMATED COUNT: 19.2 % (ref 12.3–15.4)
FERRITIN SERPL-MCNC: 1517 NG/ML (ref 30–400)
GLUCOSE BLDC GLUCOMTR-MCNC: 111 MG/DL (ref 70–99)
GLUCOSE BLDC GLUCOMTR-MCNC: 119 MG/DL (ref 70–99)
GLUCOSE SERPL-MCNC: 86 MG/DL (ref 65–99)
HCT VFR BLD AUTO: 24.8 % (ref 37.5–51)
HGB BLD-MCNC: 6.9 G/DL (ref 13–17.7)
IRON 24H UR-MRATE: 47 MCG/DL (ref 59–158)
IRON SATN MFR SERPL: 24 % (ref 20–50)
MCH RBC QN AUTO: 26.4 PG (ref 26.6–33)
MCHC RBC AUTO-ENTMCNC: 27.8 G/DL (ref 31.5–35.7)
MCV RBC AUTO: 95 FL (ref 79–97)
PHOSPHATE SERPL-MCNC: 3.6 MG/DL (ref 2.5–4.5)
PLATELET # BLD AUTO: 193 10*3/MM3 (ref 140–450)
PMV BLD AUTO: 10.7 FL (ref 6–12)
POTASSIUM SERPL-SCNC: 4.3 MMOL/L (ref 3.5–5.2)
RBC # BLD AUTO: 2.61 10*6/MM3 (ref 4.14–5.8)
RH BLD: POSITIVE
SODIUM SERPL-SCNC: 135 MMOL/L (ref 136–145)
T&S EXPIRATION DATE: NORMAL
TIBC SERPL-MCNC: 197 MCG/DL (ref 298–536)
TRANSFERRIN SERPL-MCNC: 132 MG/DL (ref 200–360)
WBC NRBC COR # BLD AUTO: 4.22 10*3/MM3 (ref 3.4–10.8)

## 2025-01-06 PROCEDURE — 82728 ASSAY OF FERRITIN: CPT | Performed by: INTERNAL MEDICINE

## 2025-01-06 PROCEDURE — 99233 SBSQ HOSP IP/OBS HIGH 50: CPT | Performed by: FAMILY MEDICINE

## 2025-01-06 PROCEDURE — 94761 N-INVAS EAR/PLS OXIMETRY MLT: CPT

## 2025-01-06 PROCEDURE — 83540 ASSAY OF IRON: CPT | Performed by: INTERNAL MEDICINE

## 2025-01-06 PROCEDURE — 94799 UNLISTED PULMONARY SVC/PX: CPT

## 2025-01-06 PROCEDURE — 25010000002 EPOETIN ALFA PER 1000 UNITS: Performed by: INTERNAL MEDICINE

## 2025-01-06 PROCEDURE — 94668 MNPJ CHEST WALL SBSQ: CPT

## 2025-01-06 PROCEDURE — 86850 RBC ANTIBODY SCREEN: CPT | Performed by: STUDENT IN AN ORGANIZED HEALTH CARE EDUCATION/TRAINING PROGRAM

## 2025-01-06 PROCEDURE — 84466 ASSAY OF TRANSFERRIN: CPT | Performed by: INTERNAL MEDICINE

## 2025-01-06 PROCEDURE — 97530 THERAPEUTIC ACTIVITIES: CPT

## 2025-01-06 PROCEDURE — 80069 RENAL FUNCTION PANEL: CPT | Performed by: FAMILY MEDICINE

## 2025-01-06 PROCEDURE — 82948 REAGENT STRIP/BLOOD GLUCOSE: CPT | Performed by: INTERNAL MEDICINE

## 2025-01-06 PROCEDURE — 86900 BLOOD TYPING SEROLOGIC ABO: CPT | Performed by: STUDENT IN AN ORGANIZED HEALTH CARE EDUCATION/TRAINING PROGRAM

## 2025-01-06 PROCEDURE — 97110 THERAPEUTIC EXERCISES: CPT

## 2025-01-06 PROCEDURE — 82948 REAGENT STRIP/BLOOD GLUCOSE: CPT

## 2025-01-06 PROCEDURE — 94664 DEMO&/EVAL PT USE INHALER: CPT

## 2025-01-06 PROCEDURE — 86901 BLOOD TYPING SEROLOGIC RH(D): CPT | Performed by: STUDENT IN AN ORGANIZED HEALTH CARE EDUCATION/TRAINING PROGRAM

## 2025-01-06 PROCEDURE — 85027 COMPLETE CBC AUTOMATED: CPT | Performed by: FAMILY MEDICINE

## 2025-01-06 RX ORDER — MIDODRINE HYDROCHLORIDE 5 MG/1
5 TABLET ORAL
Status: DISCONTINUED | OUTPATIENT
Start: 2025-01-06 | End: 2025-01-06

## 2025-01-06 RX ORDER — HYDROXYZINE HYDROCHLORIDE 25 MG/1
25 TABLET, FILM COATED ORAL ONCE
Status: COMPLETED | OUTPATIENT
Start: 2025-01-06 | End: 2025-01-06

## 2025-01-06 RX ORDER — MIDODRINE HYDROCHLORIDE 2.5 MG/1
2.5 TABLET ORAL
Status: DISCONTINUED | OUTPATIENT
Start: 2025-01-07 | End: 2025-01-07

## 2025-01-06 RX ADMIN — ARFORMOTEROL TARTRATE 15 MCG: 15 SOLUTION RESPIRATORY (INHALATION) at 20:53

## 2025-01-06 RX ADMIN — BUDESONIDE 0.5 MG: 0.5 INHALANT ORAL at 06:33

## 2025-01-06 RX ADMIN — GUAIFENESIN 100 MG: 100 LIQUID ORAL at 09:23

## 2025-01-06 RX ADMIN — IPRATROPIUM BROMIDE AND ALBUTEROL SULFATE 3 ML: 2.5; .5 SOLUTION RESPIRATORY (INHALATION) at 06:33

## 2025-01-06 RX ADMIN — ACETAMINOPHEN 650 MG: 325 TABLET ORAL at 05:22

## 2025-01-06 RX ADMIN — WHITE PETROLATUM 1 APPLICATION: 1.75 OINTMENT TOPICAL at 16:14

## 2025-01-06 RX ADMIN — IPRATROPIUM BROMIDE AND ALBUTEROL SULFATE 3 ML: 2.5; .5 SOLUTION RESPIRATORY (INHALATION) at 20:54

## 2025-01-06 RX ADMIN — FAMOTIDINE 10 MG: 20 TABLET, FILM COATED ORAL at 09:23

## 2025-01-06 RX ADMIN — IPRATROPIUM BROMIDE AND ALBUTEROL SULFATE 3 ML: 2.5; .5 SOLUTION RESPIRATORY (INHALATION) at 10:45

## 2025-01-06 RX ADMIN — BUDESONIDE 0.5 MG: 0.5 INHALANT ORAL at 20:54

## 2025-01-06 RX ADMIN — MIDODRINE HYDROCHLORIDE 10 MG: 10 TABLET ORAL at 05:22

## 2025-01-06 RX ADMIN — IPRATROPIUM BROMIDE AND ALBUTEROL SULFATE 3 ML: 2.5; .5 SOLUTION RESPIRATORY (INHALATION) at 23:38

## 2025-01-06 RX ADMIN — AMOXICILLIN AND CLAVULANATE POTASSIUM 500 MG: 500; 125 TABLET, FILM COATED ORAL at 11:24

## 2025-01-06 RX ADMIN — QUETIAPINE FUMARATE 37.5 MG: 25 TABLET ORAL at 21:31

## 2025-01-06 RX ADMIN — IPRATROPIUM BROMIDE AND ALBUTEROL SULFATE 3 ML: 2.5; .5 SOLUTION RESPIRATORY (INHALATION) at 14:36

## 2025-01-06 RX ADMIN — Medication 2.5 MG: at 21:31

## 2025-01-06 RX ADMIN — ERYTHROPOIETIN 30000 UNITS: 20000 INJECTION, SOLUTION INTRAVENOUS; SUBCUTANEOUS at 20:27

## 2025-01-06 RX ADMIN — ARFORMOTEROL TARTRATE 15 MCG: 15 SOLUTION RESPIRATORY (INHALATION) at 06:33

## 2025-01-06 RX ADMIN — ACETAMINOPHEN 650 MG: 325 TABLET ORAL at 21:31

## 2025-01-06 RX ADMIN — AMOXICILLIN AND CLAVULANATE POTASSIUM 500 MG: 500; 125 TABLET, FILM COATED ORAL at 21:31

## 2025-01-06 RX ADMIN — LEVOTHYROXINE SODIUM 175 MCG: 0.03 TABLET ORAL at 05:22

## 2025-01-06 RX ADMIN — HYDROXYZINE HYDROCHLORIDE 25 MG: 25 TABLET ORAL at 01:30

## 2025-01-06 NOTE — PROGRESS NOTES
[facility]   Hospitalist Progress Note  Date: 2025  Patient Name: Nelson Wang  : 1945  MRN: [id number]  Date of admission: 2024      Subjective   Subjective     Chief Complaint: Follow-up shortness of breath    Summary:Nelson Wang is a 79 y.o. 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 Patient was started on supplemental oxygen to maintain his oxygen saturations. He was given a dose of Bumex overnight. His chest x-ray was consistent with bilateral pleural effusions. Pulmonology was consulted for the bilateral pleural effusions and and nephrology consulted for possible volume overload. Pulmonology was performing a thoracentesis this morning. Thoracentesis was showing bloody fluid. Patient suffered CODE BLUE. Likely bleeding into his airways causing hypoxic arrest. He received CPR for 6 minutes and achieved ROSC. He was transferred to the ICU, intubated and on blood pressure support. Bronchoscopy was showing blood clots in his airways. Patient was started on CRRT. Patient's medical status is tenuous. He is currently on 1 pressor. CRRT is removing fluid at a slow rate. Patient started on micafungin on 2024. Patient extubated on 2024. Patient on high flow nasal cannula 30 L 25%. Slowly weaning down on supplemental oxygen, breathing more comfortably. Poor mental status still waxing and waning suspect underlying dementia with history of alcohol abuse, mixed with hospital delirium and hyperactive delirium.  Patient continues on hemodialysis with midodrine on dialysis days has required extra Seroquel and occasional as needed medications for sedation to allow him to tolerate hemodialysis.  Working on mentation and initially trying to get back home though patient had a fall on the evening of 2024 and has had difficulty with  transfers and ambulation.  No fractures on radiographs.  Patient developed fever tachycardia.  Chest CT demonstrated worsening right-sided infiltrate suspect aspiration.  Started on empiric antibiotics for aspiration pneumonia.  Diet downgraded by speech.  Hemoglobin trended down planning on blood transfusion with dialysis on 1/7/2025 as patient has lost IV access and refused replacement.  Family planning on discharging home with 24/7 care on Wednesday, 1/8/2025.    Interval Followup: Patient laying in bed appears to be resting comfortably with family at the bedside.  Patient pleasant and alert today.  Afebrile overnight.  Heart rate within normal limits.  Blood pressure improving.  Tapering midodrine.  Patient requiring 2 L nasal cannula keep sats greater than 90%.  Hemoglobin trending down to 6.9.    No other issues per nursing.    Review of Systems  Constitutional: Negative for fatigue and fever.   HENT: Negative for sore throat and positive trouble swallowing.    Eyes: Negative for pain and discharge.   Respiratory: Positive for cough and negative shortness of breath.    Cardiovascular: Negative for chest pain and palpitations.   Gastrointestinal: Negative for abdominal pain, nausea and vomiting.   Endocrine: Negative for cold intolerance and heat intolerance.   Genitourinary: Negative for difficulty urinating and dysuria.   Musculoskeletal: Negative for back pain and neck stiffness.   Skin: Negative for color change and rash.   Neurological: Negative for syncope and headaches.   Hematological: Negative for adenopathy.   Psychiatric/Behavioral: Positive for confusion and negative hallucinations.    Objective   Objective     Vitals:   Temp:  [97.5 °F (36.4 °C)-97.9 °F (36.6 °C)] 97.9 °F (36.6 °C)  Heart Rate:  [70-92] 92  Resp:  [16-20] 20  BP: (129-151)/(36-78) 151/78  Flow (L/min) (Oxygen Therapy):  [1-2] 2  Physical Exam   General: well-developed appearing stated age in no acute distress  HEENT: Normocephalic  atraumatic moist membranes pupils equal round reactive light, no scleral icterus no conjunctival injection  Cardiovascular: Irregularly irregular, no lower extremity edema appreciated  Pulmonary: Bibasilar crackles no wheezes or rhonchi symmetric chest expansion, unlabored, no conversational dyspnea appreciated  Gastrointestinal: Soft nontender nondistended positive bowel sounds all 4 quadrants no rebound or guarding  Musculoskeletal: No clubbing cyanosis, warm and well-perfused, calves soft symmetric nontender bilaterally  Skin: Clean dry without rashes  Neuro: Cranial nerves II through XII intact grossly no sensorimotor deficits appreciated bilateral upper and lower extremities  Psych: Patient is calm cooperative and appropriate with exam not responding to internal stimuli  : No Kolb catheter no bladder distention no suprapubic tenderness    Result Review    Result Review:  I have personally reviewed these results and agree with these findings:  [x]  Laboratory  LAB RESULTS:      Lab 01/06/25 0518 01/05/25 1002 01/04/25  0844 01/03/25  0900 01/02/25  0559   WBC 4.22 5.40 6.06 5.18 7.11   HEMOGLOBIN 6.9* 7.0* 7.1* 7.5* 7.8*   HEMATOCRIT 24.8* 24.3* 24.5* 26.9* 27.5*   PLATELETS 193 192 199 179 194   MCV 95.0 92.7 90.7 93.4 92.6         Lab 01/06/25 0518 01/05/25 1002 01/04/25  0844 01/03/25  0900 01/02/25  0559   SODIUM 135* 135* 138 132* 140   POTASSIUM 4.3 4.5 4.4 4.8 5.0   CHLORIDE 100 99 98 97* 102   CO2 21.7* 26.7 28.7 22.3 29.4*   ANION GAP 13.3 9.3 11.3 12.7 8.6   BUN 32* 20 43* 25* 38*   CREATININE 3.88* 2.95* 4.93* 3.34* 5.94*   EGFR 15.1* 21.0* 11.4* 18.1* 9.1*   GLUCOSE 86 134* 132* 140* 118*   CALCIUM 9.8 9.6 9.8 9.4 9.9   PHOSPHORUS 3.6 3.5 5.3* 2.5  --          Lab 01/06/25 0518 01/05/25 1002 01/04/25  0844 01/03/25  0900   ALBUMIN 2.6* 2.8* 3.0* 2.5*         Lab 01/03/25  0900   PROBNP 19,861.0*             Lab 01/06/25  0601 01/06/25  0518   IRON  --  47*   IRON SATURATION (TSAT)  --  24    TIBC  --  197*   TRANSFERRIN  --  132*   FERRITIN  --  1,517.00*   ABO TYPING AB  --    RH TYPING Positive  --    ANTIBODY SCREEN Negative  --          Brief Urine Lab Results  (Last result in the past 365 days)        Color   Clarity   Blood   Leuk Est   Nitrite   Protein   CREAT   Urine HCG        10/27/24 1417 Yellow   Cloudy   Negative   Trace   Negative   >=300 mg/dL (3+)                 Microbiology Results (last 10 days)       Procedure Component Value - Date/Time    Blood Culture - Blood, Arm, Right [[id number]]  (Normal) Collected: 01/02/25 2157    Lab Status: Preliminary result Specimen: Blood from Arm, Right Updated: 01/05/25 2215     Blood Culture No growth at 3 days    Narrative:      Less than seven (7) mL's of blood was collected.  Insufficient quantity may yield false negative results.    Blood Culture - Blood, Arm, Right [[id number]]  (Normal) Collected: 01/02/25 2157    Lab Status: Preliminary result Specimen: Blood from Arm, Right Updated: 01/05/25 2215     Blood Culture No growth at 3 days    Narrative:      Less than seven (7) mL's of blood was collected.  Insufficient quantity may yield false negative results.    Respiratory Panel PCR w/COVID-19(SARS-CoV-2) RA/[facility]ANIA/GAVIN/[facility] In-House, NP Swab in UTM/VTM, 2 HR TAT - Swab, Nasopharynx [[id number]]  (Normal) Collected: 01/02/25 2117    Lab Status: Final result Specimen: Swab from Nasopharynx Updated: 01/02/25 2225     ADENOVIRUS, PCR Not Detected     Coronavirus 229E Not Detected     Coronavirus HKU1 Not Detected     Coronavirus NL63 Not Detected     Coronavirus OC43 Not Detected     COVID19 Not Detected     Human Metapneumovirus Not Detected     Human Rhinovirus/Enterovirus Not Detected     Influenza A PCR Not Detected     Influenza B PCR Not Detected     Parainfluenza Virus 1 Not Detected     Parainfluenza Virus 2 Not Detected     Parainfluenza Virus 3 Not Detected     Parainfluenza Virus 4 Not Detected     RSV, PCR Not Detected      Bordetella pertussis pcr Not Detected     Bordetella parapertussis PCR Not Detected     Chlamydophila pneumoniae PCR Not Detected     Mycoplasma pneumo by PCR Not Detected    Narrative:      In the setting of a positive respiratory panel with a viral infection PLUS a negative procalcitonin without other underlying concern for bacterial infection, consider observing off antibiotics or discontinuation of antibiotics and continue supportive care. If the respiratory panel is positive for atypical bacterial infection (Bordetella pertussis, Chlamydophila pneumoniae, or Mycoplasma pneumoniae), consider antibiotic de-escalation to target atypical bacterial infection.            [x]  Microbiology  [x]  Radiology  CT Chest Without Contrast Diagnostic    Result Date: 1/3/2025   1. Interval decrease in, if not resolution of, the left pleural effusion. Interval slight decrease in size of the right pleural effusion.  2. There may be interval increase in airspace opacification on the right. Decreased airspace disease is seen on the left.  3. There is moderate cardiomegaly, as before.  4. The study is motion-limited.  5. 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 These findings may contribute to the infectious process within the thorax, especially the pleural effusions. Empyema(s), especially on the right, cannot be excluded. No ectopic gas foci are seen in these areas. Paravertebral abscess or phlegmon, especially on the right, cannot be excluded. Thoracic spine MRI examination without and with intravenous gadolinium-based contrast agent (GBCA) administration may be helpful in further imaging assessment if clinically warranted and if not contraindicated.  6. Please see above comments for further detail.    Portions of this note were completed with a voice recognition program.  Electronically Signed By-Stan Hogan MD  On:1/3/2025 1:24 AM      XR Chest 1 View    Result Date: 1/1/2025  Impression: The nasogastric tube has been removed. Otherwise no significant change from previous examination. Low lung volumes, cardiomegaly, small right pleural effusion and patchy bibasilar airspace opacities, atelectasis versus pneumonia. Electronically Signed: Solange Iverson MD  1/1/2025 2:26 PM EST  Workstation ID: [id number]    CT Head Without Contrast    Result Date: 12/30/2024  Impression: 1. No intracranial hemorrhage or acute intracranial abnormality.. 2. Generalized cerebral atrophy with moderate white matter findings of chronic microvascular disease. Electronically Signed: Lasha Ramos MD  12/30/2024 10:38 PM EST  Workstation ID: [id number]    XR Ankle 2 View Right    Result Date: 12/30/2024  Impression: 1. Negative for acute fracture. 2. Stable chronic findings above. Electronically Signed: Lasha Ramos MD  12/30/2024 3:29 PM EST  Workstation ID: [id number]     []  EKG/Telemetry   []  Cardiology/Vascular   []  Pathology  []  Old records  [x]  Other:  Scheduled Meds:amoxicillin-clavulanate, 1 tablet, Oral, BID  arformoterol, 15 mcg, Nebulization, BID - RT  budesonide, 0.5 mg, Nebulization, BID - RT  famotidine, 10 mg, Oral, Daily  insulin lispro, 2-7 Units, Subcutaneous, BID AC  ipratropium-albuterol, 3 mL, Nebulization, Q4H While Awake - RT  levothyroxine, 175 mcg, Oral, Q AM  melatonin, 2.5 mg, Oral, Nightly  midodrine, 10 mg, Oral, Once per day on Tuesday Thursday Saturday  midodrine, 5 mg, Oral, TID AC  mineral oil-hydrophilic petrolatum, 1 Application, Topical, Daily  QUEtiapine, 37.5 mg, Oral, Nightly  sodium chloride, 10 mL, Intravenous, Q12H      Continuous Infusions:   PRN Meds:.  acetaminophen    albuterol    artificial tears    senna-docusate sodium **AND** polyethylene glycol **AND** [DISCONTINUED] bisacodyl **AND** bisacodyl    calamine    dextrose    dextrose    Diclofenac Sodium    glucagon (human recombinant)    guaifenesin     heparin (porcine)    heparin (porcine)    hydrocortisone ace-pramoxine    Lidocaine    ondansetron    Polyvinyl Alcohol-Povidone PF    QUEtiapine    sodium chloride    sodium chloride    sodium chloride    trolamine salicylate      Assessment & Plan   Assessment / Plan     Assessment/Plan:  Suspected aspiration pneumonia  Cardiac arrest secondary to hypoxemia in setting of thoracentesis  Acute hypoxic/hypercapnic respiratory failure requiring mechanical ventilation  Acute metabolic encephalopathy/altered mental status, initial concern for seizures, EEG negative x 2  Acute on chronic diastolic heart failure  Acute cardiogenic pulmonary edema  Bilateral pleural effusions  Hemoptysis  Candidemia s/p iv micafungin course completed 12/22/24  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  Atrial fibrillation on Eliquis  ESRD on home HD   Type 2 diabetes  Mild nonobstructive CAD per 5/29/2024 cath  Suspect underlying dementia with episodes of possible delirium and hyperactive delirium  Fall on 12-28  Normocytic anemia  Concern for aspiration  Hypothyroidism          Patient admitted for further evaluation and treatment  Nephrology, psychiatry and pulmonology critical care consulted thank you for your assistance  Continue Augmentin renally dosed complete 5-day course for coverage of aspiration pneumonia as patient refusing replacement of IV  Completed course of azithromycin  Continue scheduled DuoNebs  Continue supplemental oxygen titrate to keep sats greater than 90%  Continue to encourage pulmonary toilet  Continue bronchopulmonary hygiene protocol  Continue to encourage incentive spirometry  Continue aspiration precautions  Continue speech therapy  Downgrade diet to puréed with thin liquids  Continue Brovanelli Pulmicort scheduled with as needed DuoNebs  Continue hemodialysis and ultrafiltration per nephrology  Continue to titrate midodrine as needed  Continue to hold Eliquis due to hemoptysis and  hemothorax  Continue sliding scale insulin  Continue Seroquel 37.5 mg at night and titrate as needed  Continue regular reorientation  Continue to reinforce day night cycle  Continue physical therapy Occupational Therapy  Continue monitor anemia and transfuse as needed keep hemoglobin greater than 7  Plan to transfuse 2 units packed red blood cells with dialysis tomorrow discussed with nephrology  Continue levothyroxine  Further inpatient orders or recommendations pending clinical course         Discussed plan with bedside RN wellness Dr. Castro nephrology attending.    Disposition: Home with family and home health on Wednesday if no further issues.  Discharge planning coordinating.      VTE Prophylaxis:  Pharmacologic & mechanical VTE prophylaxis orders are present.        CODE STATUS:   Level Of Support Discussed With: Next of Kin (If No Surrogate)  Code Status (Patient has no pulse and is not breathing): No CPR (Do Not Attempt to Resuscitate)  Medical Interventions (Patient has pulse or is breathing): Full Support

## 2025-01-06 NOTE — PROGRESS NOTES
[facility]     Nephrology Progress Note      Patient Name: Nelson Wang  : 1945  MRN: [id number]  Primary Care Physician:  Domingo Bravo MD  Date of admission: 2024    Subjective   Subjective     Interval History:  Awake and alert, confused.  No distress, sitter at bedside. Tolerating some p.o.      Objective   Objective     Vitals:   Temp:  [97.5 °F (36.4 °C)-97.9 °F (36.6 °C)] 97.9 °F (36.6 °C)  Heart Rate:  [70-92] 92  Resp:  [16-20] 20  BP: (129-151)/(36-78) 151/78  Flow (L/min) (Oxygen Therapy):  [1-2] 2  Physical Exam:   constitutional: Awake, alert, confused, mildly restless.    Eyes: sclerae anicteric, no conjunctival injection   HENT: mucous membranes moist.     Neck: Supple, no thyromegaly, no lymphadenopathy, trachea midline, No JVD   Respiratory: Decreased to auscultation bilaterally, nonlabored respirations.   Cardiovascular: RRR, no murmurs, rubs, or gallops.   Gastrointestinal: Positive bowel sounds, soft,  nondistended   Musculoskeletal: No edema, no clubbing or cyanosis.  Left upper extremity AV fistula, patent.   Neurologic: moving extremities, answering questions but inconsistently.  Incoherent at times.   Skin: warm and dry, no rashes, areas of hypopigmentation.    Result Review    Result Reviewed:  I have personally reviewed the results from the time of this admission to 2025 17:47 EST and agree with these findings:  [x]  Laboratory  []  Microbiology  [x]  Radiology  []  EKG/Telemetry   []  Cardiology/Vascular   []  Pathology  [x]  Old records  []  Other:        Lab 25  0518 25  1002 25  0844 25  0900 25  0559 25  0544 24  0521   SODIUM 135* 135* 138 132* 140 135* 134*   POTASSIUM 4.3 4.5 4.4 4.8 5.0 4.5 5.3*   CHLORIDE 100 99 98 97* 102 96* 97*   CO2 21.7* 26.7 28.7 22.3 29.4* 28.1 22.9   BUN 32* 20 43* 25* 38* 23 38*   CREATININE 3.88* 2.95* 4.93* 3.34* 5.94* 4.28* 7.05*   GLUCOSE 86 134* 132* 140* 118* 128* 101*   EGFR  15.1* 21.0* 11.4* 18.1* 9.1* 13.5* 7.4*   ANION GAP 13.3 9.3 11.3 12.7 8.6 10.9 14.1   PHOSPHORUS 3.6 3.5 5.3* 2.5  --   --   --            Assessment & Plan   Assessment / Plan       Active Hospital Problems:  Active Hospital Problems    Diagnosis     **Hypoxia     Gram-positive bacteremia     ESRD (end stage renal disease)     Essential hypertension     Type 2 diabetes mellitus without complication      Assessment and Plan:    - ESRD, was on home dialysis prior to admission, has left upper extremity AV fistula for access.  Electrolytes are stable.  Dialysis TTS for now.  Now looking for rehab placement.        - Aspiration pneumonia, and possibly recurrent aspiration, per pulmonary.      - Type 2 diabetes with complications.     - Hypotension blood pressure is acceptable now, on ProAmatine and blood pressure stable.     - Anemia of chronic kidney disease, was on RONALD as outpatient.  Resume Epogen 30,000 units subcu weekly, first dose today.  Iron studies this morning: Iron saturation 24% and ferritin 1500.  Transfusion planned tomorrow with dialysis.     - Altered mentation.  Remains incoherent. Unlikely uremia given consistent dialysis to this point.      Discussed with primary.

## 2025-01-06 NOTE — PLAN OF CARE
Goal Outcome Evaluation:  Plan of Care Reviewed With: patient        Progress: improving  Outcome Evaluation: Pt A&Ox1-2 this shift. VSS. Prn tylenol given q6hr per pt's daughter request. Pt has been pleasantly confused, easily redirectable this shift. Restless - Dr. Sandoval ordered x1 dose of atarax - medication given. Safety sitter at bedside. Call light in reach, bed in lowest position, room near nurses station, safety sitter at bedside. No furhter issues/needs at this time. Pt has not slept all night. Restless, but pleasant. Manual BP 0446 was 132/36 - Dr. Sandoval notified. No further orders at this time. Pt gets scheduled midodrine this AM. Hgb 6.9 this morning - yesterday's hgb was 7.0. Provider contacted. Type & screen ordered. TBD on transfusion or not per day shift team.

## 2025-01-06 NOTE — THERAPY TREATMENT NOTE
Acute Care - Physical Therapy Treatment Note  BRIA Sullivan     Patient Name: Nelson Wang  : 1945  MRN: [id number]  Today's Date: 2025      Visit Dx:     ICD-10-CM ICD-9-CM   1. Acute respiratory failure with hypoxia  J96.01 518.81   2. Acute pulmonary edema  J81.0 518.4   3. Difficulty walking  R26.2 719.7   4. Oropharyngeal dysphagia  R13.12 787.22     Patient Active Problem List   Diagnosis    Essential hypertension    Bradycardia, sinus    Anemia due to chronic kidney disease    Hypothyroidism    Idiopathic gout    Proteinuria    Psoriasis    Thrombocytopenia    Type 2 diabetes mellitus without complication    CKD (chronic kidney disease), stage IV    Hyperlipidemia    Monoclonal gammopathy of unknown significance (MGUS)    Stage 5 chronic kidney disease    Chronic gout without tophus    Peritoneal dialysis catheter dysfunction    Medicare annual wellness visit, subsequent    Chronic cough    Peritonitis associated with peritoneal dialysis    Recurrent pneumonia    Acute on chronic respiratory failure with hypoxia    ESRD (end stage renal disease)    Aspiration pneumonitis    Sepsis    Type 2 diabetes mellitus, with long-term current use of insulin    GERD without esophagitis    Immunosuppression due to drug therapy    Bipolar disorder    Chronic respiratory failure with hypoxia    Shortness of breath    Abnormal stress test    ESRD on dialysis    Abnormal chest CT    Encounter for screening for malignant neoplasm of colon    History of colon polyps    Family history of colon cancer    Intractable pain    Abdominal pain    ESRD (end stage renal disease) on dialysis    AMS (altered mental status)    Hallucinations    LUQ pain    Discitis    Metabolic encephalopathy    Aspiration pneumonia    Discitis of lumbar region    Severe malnutrition    Dementia    Unspecified severe protein-calorie malnutrition    Hypoxia    Gram-positive bacteremia     Past Medical History:   Diagnosis Date    Abnormal stress  test     Anemia     IRON INFUSIONS WITH DIALYSIS    Anesthesia     WITH HIP REPLACEMENT DAUGHTER BELIEVES HAD SVT 2000    Ankle pain, right     Anxiety and depression     Arthritis     CKD (chronic kidney disease), stage IV     DIALYSIS [id number] SHILEY IN RIGHT CHEST    Diabetes     GERD (gastroesophageal reflux disease)     Gout     High cholesterol     History of peritoneal dialysis     HL (hearing loss)     Hyperkalemia     Hypertension     Hypothyroidism     Insomnia     Night terrors     Psoriasis     Psoriasis     Sleep apnea     Sleep walking     Thrombocytopenia     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    Vitiligo      Past Surgical History:   Procedure Laterality Date    ABDOMINAL SURGERY      ANKLE SURGERY Right 11/1990    APPENDECTOMY N/A 1954    ARTERIOVENOUS FISTULA/SHUNT SURGERY Left 07/16/2024    Procedure: Creation of left arm arteriovenous fistula;  Surgeon: Moses Mir MD;  Location: [facility] MAIN OR;  Service: Vascular;  Laterality: Left;    BRONCHOSCOPY N/A 03/01/2024    Procedure: BRONCHOSCOPY WITH BAL AND WASHINGS;  Surgeon: Sandra Espinal MD;  Location: [facility] ENDOSCOPY;  Service: Pulmonary;  Laterality: N/A;  PNEUMONIA    BRONCHOSCOPY N/A 08/30/2024    Procedure: BRONCHOSCOPY WITH BALS AND WASHINGS;  Surgeon: Sandra Espinal MD;  Location: [facility] ENDOSCOPY;  Service: Pulmonary;  Laterality: N/A;  MUCOUS PLUGGING    CARDIAC CATHETERIZATION N/A 05/29/2024    Procedure: Left Heart Cath with possible coronary angioplasty;  Surgeon: Stephan Nichols MD;  Location: [facility] CATH INVASIVE LOCATION;  Service: Cardiology;  Laterality: N/A;    COLONOSCOPY N/A 06/2022    [facility]    ENDOSCOPY N/A 10/28/2024    Procedure: ESOPHAGOGASTRODUODENOSCOPY INSERTION OF LIGHTED INSTRUMENT TO VIEW ESOPHAGUS, STOMACH AND SMALL INTESTINE;  Surgeon: Kingsley Peraza MD;  Location: [facility] ENDOSCOPY;  Service: Gastroenterology;  Laterality: N/A;  Gastritis    FRACTURE SURGERY      HIP BIPOLAR  REPLACEMENT Right 01/2000    INSERTION HEMODIALYSIS CATHETER Left 04/09/2024    Procedure: HEMODIALYSIS CATHETER INSERTION;  Surgeon: Jose Berry MD;  Location: [facility] MAIN OR;  Service: General;  Laterality: Left;    INSERTION HEMODIALYSIS CATHETER N/A 04/12/2024    Procedure: Tunneled hemodialysis catheter insertion;  Surgeon: Enrique Vinson MD;  Location: [facility] MAIN OR;  Service: Vascular;  Laterality: N/A;    INSERTION PERITONEAL DIALYSIS CATHETER N/A 03/27/2023    Procedure: LAPAROSCOPIC INSERTION PERITONEAL DIALYSIS CATHETER, LAPAROSCOPIC OMENTOPEXY WITH LYSIS OF ADHESIONS;  Surgeon: Jose Berry MD;  Location: [facility] MAIN OR;  Service: General;  Laterality: N/A;    INSERTION PERITONEAL DIALYSIS CATHETER Left 07/23/2023    Procedure: REVISION OF PERITONEAL DIALYSIS CATHETER;  Surgeon: Radha Oreilly MD;  Location: [facility] MAIN OR;  Service: General;  Laterality: Left;    JOINT REPLACEMENT      REMOVAL PERITONEAL DIALYSIS CATHETER N/A 04/09/2024    Procedure: REMOVAL PERITONEAL DIALYSIS CATHETER;  Surgeon: Jose Berry MD;  Location: [facility] MAIN OR;  Service: General;  Laterality: N/A;    RENAL BIOPSY Left 07/15/2022    UPPER GASTROINTESTINAL ENDOSCOPY      VASCULAR SURGERY      WRIST SURGERY      UNSURE WHICH SIDE DAUGHTER REPORTS HAD SEVERE  CUT FROM WINDOW AND THEY HAD TO DO RECONSTRUCTIVE SURGERY WITH VESSELS AND NERVES     PT Assessment (Last 12 Hours)       PT Evaluation and Treatment       Row Name 01/06/25 0813          Physical Therapy Time and Intention    Subjective Information complains of;weakness;fatigue  -DK     Document Type therapy note (daily note)  -DK     Mode of Treatment individual therapy;physical therapy  -DK     Patient Effort good  -DK     Symptoms Noted During/After Treatment fatigue  -DK     Comment Pt appears somewhat easily agitated today, kept stiffening / resisting exercises / transfers.  However, pt was able to transfer to sitting,  then standing, but was unable to take steps this session.  -       Row Name 01/06/25 0835          Pain    Pretreatment Pain Rating 0/10 - no pain  -DK     Posttreatment Pain Rating 0/10 - no pain  -DK       Row Name 01/06/25 0835          Cognition    Affect/Mental Status (Cognition) confused;anxious;agitated  -DK     Orientation Status (Cognition) oriented to;person  -DK     Follows Commands (Cognition) physical/tactile prompts required;repetition of directions required;verbal cues/prompting required  -DK     Cognitive Function (Cognition) attention deficit  -DK     Attention Deficit (Cognition) minimal deficit;moderate deficit;concentration;focused/sustained attention;arousal/alertness  -DK     Personal Safety Interventions gait belt;nonskid shoes/slippers when out of bed;supervised activity  -DK       Row Name 01/06/25 0835          Bed Mobility    Bed Mobility scooting/bridging;supine-sit-supine  -DK     All Activities, [place] (Bed Mobility) 2 person assist;minimum assist (75% patient effort);moderate assist (50% patient effort)  -DK     Rolling Left [place] (Bed Mobility) minimum assist (75% patient effort);2 person assist  -DK     Rolling Right [place] (Bed Mobility) 1 person assist  -DK     Scooting/Bridging [place] (Bed Mobility) moderate assist (50% patient effort);2 person assist  -DK     Supine-Sit [place] (Bed Mobility) moderate assist (50% patient effort);maximum assist (25% patient effort);2 person assist  -DK     Sit-Supine [place] (Bed Mobility) moderate assist (50% patient effort);maximum assist (25% patient effort);2 person assist  -DK     Supine-Sit-Supine [place] (Bed Mobility) moderate assist (50% patient effort);maximum assist (25% patient effort);2 person assist  -DK     Bed Mobility, Safety Issues cognitive deficits limit understanding;decreased use of legs for bridging/pushing  -DK     Assistive Device (Bed Mobility) bed rails;repositioning sheet  -DK        Row Name 01/06/25 0835          Transfers    Transfers sit-stand transfer;stand-sit transfer  -DK     Comment, (Transfers) Pt stood x 1-2 minutes with a rolling walker, O2 and assist x 2 persons.  He was unable to take steps and was returned to bed post treatment.  -       Row Name 01/06/25 0835          Sit-Stand Transfer    Sit-Stand [place] (Transfers) moderate assist (50% patient effort);maximum assist (25% patient effort);2 person assist  -     Assistive Device (Sit-Stand Transfers) walker, front-wheeled  -DK       Row Name 01/06/25 0835          Stand-Sit Transfer    Stand-Sit [place] (Transfers) moderate assist (50% patient effort);maximum assist (25% patient effort);2 person assist  -     Assistive Device (Stand-Sit Transfers) walker, front-wheeled  -DK       Row Name 01/06/25 0835          Safety Issues/Impairments Affecting Functional Mobility    Safety Issues Affecting Function (Mobility) ability to follow commands;awareness of need for assistance;impulsivity;judgment;safety precaution awareness  -     Impairments Affecting Function (Mobility) balance;cognition;endurance/activity tolerance;strength;shortness of breath  -     Cognitive Impairments, Mobility Safety/Performance attention;awareness, need for assistance;impulsivity;judgment;safety precaution awareness  -DK       Row Name 01/06/25 0835          Balance    Balance Assessment sitting static balance;sitting dynamic balance;standing static balance  -DK     Static Sitting Balance minimal assist;moderate assist;1-person assist  -DK     Dynamic Sitting Balance minimal assist;moderate assist;1-person assist  -DK     Position, Sitting Balance supported;sitting edge of bed  -DK     Static Standing Balance moderate assist;2-person assist  -DK     Position/Device Used, Standing Balance supported;walker, front-wheeled  -     Balance Interventions standing;supported;static;tandem standing  -       Row Name 01/06/25 0835           Motor Skills    Motor Skills --  therapeutic exercises  -     Therapeutic Exercise hip;knee;ankle  -     Additional Documentation --  Pt was resisting most movements this session. Frequent cues to relax and to allow his leg to move.  -       Row Name 01/06/25 0835          Hip (Therapeutic Exercise)    Hip (Therapeutic Exercise) AAROM (active assistive range of motion)  -     Hip AAROM (Therapeutic Exercise) bilateral;flexion;extension;aBduction;aDduction;supine;10 repetitions;2 sets  -       Row Name 01/06/25 0835          Knee (Therapeutic Exercise)    Knee (Therapeutic Exercise) AAROM (active assistive range of motion)  -     Knee AAROM (Therapeutic Exercise) bilateral;flexion;extension;supine;10 repetitions;2 sets  -       Row Name 01/06/25 0835          Ankle (Therapeutic Exercise)    Ankle (Therapeutic Exercise) AAROM (active assistive range of motion)  -     Ankle AAROM (Therapeutic Exercise) bilateral;dorsiflexion;plantarflexion;supine;10 repetitions;2 sets  -       Row Name             Wound 11/15/24 1945 Right upper chest    Wound - Properties Group Placement Date: 11/15/24  -JR Placement Time: 1945  -JR Side: Right  -JR Orientation: upper  -JR Location: chest  -JR Primary Wound Type: Incision  -JR    Retired Wound - Properties Group Placement Date: 11/15/24  -JR Placement Time: 1945  -JR Side: Right  -JR Orientation: upper  -JR Location: chest  -JR Primary Wound Type: Incision  -JR    Retired Wound - Properties Group Placement Date: 11/15/24  -JR Placement Time: 1945  -JR Side: Right  -JR Orientation: upper  -JR Location: chest  -JR Primary Wound Type: Incision  -JR    Retired Wound - Properties Group Date first assessed: 11/15/24  -JR Time first assessed: 1945  -JR Side: Right  -JR Location: chest  -JR Primary Wound Type: Incision  -JR      Row Name             Wound 12/05/24 2331 Right lower leg abrasion    Wound - Properties Group Placement Date: 12/05/24  -AW Placement Time: 2331   -AW Side: Right  -AW Orientation: lower  -AW Location: leg  -AW Primary Wound Type: Abrasion  -AW Type: abrasion  -AW Present on Original Admission: Y  -AW    Retired Wound - Properties Group Placement Date: 12/05/24  -AW Placement Time: 2331  -AW Present on Original Admission: Y  -AW Side: Right  -AW Orientation: lower  -AW Location: leg  -AW Primary Wound Type: Abrasion  -AW Type: abrasion  -AW    Retired Wound - Properties Group Placement Date: 12/05/24  -AW Placement Time: 2331  -AW Present on Original Admission: Y  -AW Side: Right  -AW Orientation: lower  -AW Location: leg  -AW Primary Wound Type: Abrasion  -AW Type: abrasion  -AW    Retired Wound - Properties Group Date first assessed: 12/05/24  -AW Time first assessed: 2331  -AW Present on Original Admission: Y  -AW Side: Right  -AW Location: leg  -AW Primary Wound Type: Abrasion  -AW Type: abrasion  -AW      Row Name             Wound 12/06/24 1440 Left lower leg skin tear    Wound - Properties Group Placement Date: 12/06/24  -KE Placement Time: 1440  -KE Side: Left  -KE Orientation: lower  -KE Location: leg  -KE Primary Wound Type: Skin tear  -KE Type: skin tear  -KE    Retired Wound - Properties Group Placement Date: 12/06/24  -KE Placement Time: 1440  -KE Side: Left  -KE Orientation: lower  -KE Location: leg  -KE Primary Wound Type: Skin tear  -KE Type: skin tear  -KE    Retired Wound - Properties Group Placement Date: 12/06/24  -KE Placement Time: 1440  -KE Side: Left  -KE Orientation: lower  -KE Location: leg  -KE Primary Wound Type: Skin tear  -KE Type: skin tear  -KE    Retired Wound - Properties Group Date first assessed: 12/06/24  -KE Time first assessed: 1440  -KE Side: Left  -KE Location: leg  -KE Primary Wound Type: Skin tear  -KE Type: skin tear  -KE      Row Name             Wound 12/28/24 2100 Right posterior elbow skin tear    Wound - Properties Group Placement Date: 12/28/24  -KM Placement Time: 2100  -KM Side: Right  -KM Orientation:  posterior  -KM Location: elbow  -KM Primary Wound Type: Skin tear  -KM Type: skin tear  -KM    Retired Wound - Properties Group Placement Date: 12/28/24 -KM Placement Time: 2100  -KM Side: Right  -KM Orientation: posterior  -KM Location: elbow  -KM Primary Wound Type: Skin tear  -KM Type: skin tear  -KM    Retired Wound - Properties Group Placement Date: 12/28/24 -KM Placement Time: 2100  -KM Side: Right  -KM Orientation: posterior  -KM Location: elbow  -KM Primary Wound Type: Skin tear  -KM Type: skin tear  -KM    Retired Wound - Properties Group Date first assessed: 12/28/24 -KM Time first assessed: 2100 -KM Side: Right  -KM Location: elbow  -KM Primary Wound Type: Skin tear  -KM Type: skin tear  -KM      Row Name 01/06/25 0835          Plan of Care Review    Plan of Care Reviewed With patient  -DK     Progress improving  -DK       Row Name 01/06/25 0835          Positioning and Restraints    Pre-Treatment Position in bed  -DK     Post Treatment Position bed  -DK     In Bed supine;call light within reach;encouraged to call for assist;with family/caregiver;side rails up x3;legs elevated;heels elevated  -DK               User Key  (r) = Recorded By, (t) = Taken By, (c) = Cosigned By      Initials Name Provider Type    Stan Esposito, HIREN Registered Nurse    Jackelin Freitas, RN Registered Nurse    Neena Weaver RN Registered Nurse    Farzana Ma, RN Registered Nurse    Lia Canales PTA Physical Therapist Assistant                    Physical Therapy Education       Title: PT OT SLP Therapies (In Progress)       Topic: Physical Therapy (Done)       Point: Mobility training (Done)       Learning Progress Summary            Patient Acceptance, E,TB, NR,VU by EC at 1/5/2025 1328    Acceptance, E,TB, VU by AV at 12/17/2024 1454   Family Acceptance, E,TB, NR,VU by EC at 1/5/2025 1328                      Point: Home exercise program (Done)       Learning Progress Summary            Patient Acceptance,  E,TB, NR,VU by EC at 1/5/2025 1328   Family Acceptance, E,TB, NR,VU by EC at 1/5/2025 1328                      Point: Body mechanics (Done)       Learning Progress Summary            Patient Acceptance, E,TB, NR,VU by EC at 1/5/2025 1328    Acceptance, E,TB, VU by AV at 12/17/2024 1454   Family Acceptance, E,TB, NR,VU by EC at 1/5/2025 1328                      Point: Precautions (Done)       Learning Progress Summary            Patient Acceptance, E,TB, NR,VU by EC at 1/5/2025 1328    Acceptance, E,TB, VU by AV at 12/17/2024 1454   Family Acceptance, E,TB, NR,VU by EC at 1/5/2025 1328                                      User Key       Initials Effective Dates Name Provider Type Discipline     06/11/21 -  Kaushik Chavez PT Physical Therapist PT     12/23/24 -  Merry Mckee RNA Registered Nurse Nurse                  PT Recommendation and Plan  Planned Therapy Interventions (PT): balance training, bed mobility training, gait training, strengthening, transfer training  Therapy Frequency (PT): daily  Progress Summary (PT)  Progress Toward Functional Goals (PT): progress toward functional goals is good  Plan of Care Reviewed With: patient  Progress: improving   Outcome Measures       Row Name 01/06/25 0835 01/05/25 0902 01/03/25 1500       How much help from another person do you currently need...    Turning from your back to your side while in flat bed without using bedrails? 2  -DK 2  -DK 2  -TC    Moving from lying on back to sitting on the side of a flat bed without bedrails? 2  -DK 2  -DK 2  -TC    Moving to and from a bed to a chair (including a wheelchair)? 2  -DK 2  -DK 2  -TC    Standing up from a chair using your arms (e.g., wheelchair, bedside chair)? 2  -DK 2  -DK 2  -TC    Climbing 3-5 steps with a railing? 1  -DK 1  -DK 1  -TC    To walk in hospital room? 1  -DK 1  -DK 2  -TC    AM-PAC 6 Clicks Score (PT) 10  -DK 10  -DK 11  -TC       Functional Assessment    Outcome Measure Options AM-PAC 6  Clicks Basic Mobility (PT)  -DK AM-PAC 6 Clicks Basic Mobility (PT)  -DK AM-PAC 6 Clicks Daily Activity (OT)  -TC              User Key  (r) = Recorded By, (t) = Taken By, (c) = Cosigned By      Initials Name Provider Type    Lia Canales, PTA Physical Therapist Assistant    TC Maile Dueñas, PT Physical Therapist                     Time Calculation:    PT Charges       Row Name 01/06/25 0842             Time Calculation    PT Received On 01/06/25  -DK      PT Goal Re-Cert Due Date 01/12/25  -DK         Timed Charges    37357 - PT Therapeutic Exercise Minutes 14  -DK      24182 - Gait Training Minutes  2  -DK      12991 - PT Therapeutic Activity Minutes 10  -DK         Total Minutes    Timed Charges Total Minutes 26  -DK       Total Minutes 26  -DK                User Key  (r) = Recorded By, (t) = Taken By, (c) = Cosigned By      Initials Name Provider Type    Lia Canales, PTA Physical Therapist Assistant                  Therapy Charges for Today       Code Description Service Date Service Provider Modifiers Qty    00246588333 HC PT THER PROC EA 15 MIN 1/5/2025 Lia Escamilla, PTA GP 1    62515309772 HC PT THERAPEUTIC ACT EA 15 MIN 1/5/2025 Lia Escamilla, PTA GP 1    86121638565 HC PT THER PROC EA 15 MIN 1/6/2025 Lia Escamilla, PTA GP 1    18306642947 HC PT THERAPEUTIC ACT EA 15 MIN 1/6/2025 Lia Escamilla, PTA GP 1            PT G-Codes  Outcome Measure Options: AM-PAC 6 Clicks Basic Mobility (PT)  AM-PAC 6 Clicks Score (PT): 10  AM-PAC 6 Clicks Score (OT): 7    Lia Escamilla PTA  1/6/2025

## 2025-01-07 LAB
ABO GROUP BLD: NORMAL
ALBUMIN SERPL-MCNC: 2.9 G/DL (ref 3.5–5.2)
ANION GAP SERPL CALCULATED.3IONS-SCNC: 9.9 MMOL/L (ref 5–15)
BACTERIA SPEC AEROBE CULT: NORMAL
BACTERIA SPEC AEROBE CULT: NORMAL
BLD GP AB SCN SERPL QL: NEGATIVE
BUN SERPL-MCNC: 47 MG/DL (ref 8–23)
BUN/CREAT SERPL: 9.3 (ref 7–25)
CALCIUM SPEC-SCNC: 10.3 MG/DL (ref 8.6–10.5)
CHLORIDE SERPL-SCNC: 100 MMOL/L (ref 98–107)
CO2 SERPL-SCNC: 27.1 MMOL/L (ref 22–29)
CREAT SERPL-MCNC: 5.06 MG/DL (ref 0.76–1.27)
DEPRECATED RDW RBC AUTO: 63.7 FL (ref 37–54)
EGFRCR SERPLBLD CKD-EPI 2021: 11 ML/MIN/1.73
ERYTHROCYTE [DISTWIDTH] IN BLOOD BY AUTOMATED COUNT: 18.9 % (ref 12.3–15.4)
GLUCOSE BLDC GLUCOMTR-MCNC: 104 MG/DL (ref 70–99)
GLUCOSE BLDC GLUCOMTR-MCNC: 116 MG/DL (ref 70–99)
GLUCOSE SERPL-MCNC: 92 MG/DL (ref 65–99)
HCT VFR BLD AUTO: 24.9 % (ref 37.5–51)
HGB BLD-MCNC: 7.2 G/DL (ref 13–17.7)
MCH RBC QN AUTO: 26.4 PG (ref 26.6–33)
MCHC RBC AUTO-ENTMCNC: 28.9 G/DL (ref 31.5–35.7)
MCV RBC AUTO: 91.2 FL (ref 79–97)
PHOSPHATE SERPL-MCNC: 4.5 MG/DL (ref 2.5–4.5)
PLATELET # BLD AUTO: 220 10*3/MM3 (ref 140–450)
PMV BLD AUTO: 9.9 FL (ref 6–12)
POTASSIUM SERPL-SCNC: 4.7 MMOL/L (ref 3.5–5.2)
RBC # BLD AUTO: 2.73 10*6/MM3 (ref 4.14–5.8)
RH BLD: POSITIVE
SODIUM SERPL-SCNC: 137 MMOL/L (ref 136–145)
T&S EXPIRATION DATE: NORMAL
WBC NRBC COR # BLD AUTO: 4.11 10*3/MM3 (ref 3.4–10.8)

## 2025-01-07 PROCEDURE — 94799 UNLISTED PULMONARY SVC/PX: CPT

## 2025-01-07 PROCEDURE — 97110 THERAPEUTIC EXERCISES: CPT

## 2025-01-07 PROCEDURE — 94760 N-INVAS EAR/PLS OXIMETRY 1: CPT

## 2025-01-07 PROCEDURE — P9040 RBC LEUKOREDUCED IRRADIATED: HCPCS

## 2025-01-07 PROCEDURE — 85027 COMPLETE CBC AUTOMATED: CPT | Performed by: FAMILY MEDICINE

## 2025-01-07 PROCEDURE — 86900 BLOOD TYPING SEROLOGIC ABO: CPT

## 2025-01-07 PROCEDURE — 86850 RBC ANTIBODY SCREEN: CPT | Performed by: INTERNAL MEDICINE

## 2025-01-07 PROCEDURE — 80069 RENAL FUNCTION PANEL: CPT | Performed by: FAMILY MEDICINE

## 2025-01-07 PROCEDURE — P9016 RBC LEUKOCYTES REDUCED: HCPCS

## 2025-01-07 PROCEDURE — 86923 COMPATIBILITY TEST ELECTRIC: CPT

## 2025-01-07 PROCEDURE — 94761 N-INVAS EAR/PLS OXIMETRY MLT: CPT

## 2025-01-07 PROCEDURE — 94668 MNPJ CHEST WALL SBSQ: CPT

## 2025-01-07 PROCEDURE — 99233 SBSQ HOSP IP/OBS HIGH 50: CPT | Performed by: INTERNAL MEDICINE

## 2025-01-07 PROCEDURE — 94664 DEMO&/EVAL PT USE INHALER: CPT

## 2025-01-07 PROCEDURE — 82948 REAGENT STRIP/BLOOD GLUCOSE: CPT | Performed by: INTERNAL MEDICINE

## 2025-01-07 PROCEDURE — 97530 THERAPEUTIC ACTIVITIES: CPT

## 2025-01-07 PROCEDURE — 86901 BLOOD TYPING SEROLOGIC RH(D): CPT | Performed by: INTERNAL MEDICINE

## 2025-01-07 PROCEDURE — 86900 BLOOD TYPING SEROLOGIC ABO: CPT | Performed by: INTERNAL MEDICINE

## 2025-01-07 RX ORDER — IPRATROPIUM BROMIDE AND ALBUTEROL SULFATE 2.5; .5 MG/3ML; MG/3ML
3 SOLUTION RESPIRATORY (INHALATION)
Status: DISCONTINUED | OUTPATIENT
Start: 2025-01-07 | End: 2025-01-08 | Stop reason: HOSPADM

## 2025-01-07 RX ADMIN — Medication 2.5 MG: at 20:54

## 2025-01-07 RX ADMIN — WHITE PETROLATUM 1 APPLICATION: 1.75 OINTMENT TOPICAL at 16:14

## 2025-01-07 RX ADMIN — AMOXICILLIN AND CLAVULANATE POTASSIUM 500 MG: 500; 125 TABLET, FILM COATED ORAL at 12:42

## 2025-01-07 RX ADMIN — QUETIAPINE FUMARATE 12.5 MG: 25 TABLET ORAL at 12:42

## 2025-01-07 RX ADMIN — LEVOTHYROXINE SODIUM 175 MCG: 0.03 TABLET ORAL at 05:35

## 2025-01-07 RX ADMIN — ARFORMOTEROL TARTRATE 15 MCG: 15 SOLUTION RESPIRATORY (INHALATION) at 20:10

## 2025-01-07 RX ADMIN — IPRATROPIUM BROMIDE AND ALBUTEROL SULFATE 3 ML: 2.5; .5 SOLUTION RESPIRATORY (INHALATION) at 06:38

## 2025-01-07 RX ADMIN — ACETAMINOPHEN 650 MG: 325 TABLET ORAL at 12:42

## 2025-01-07 RX ADMIN — ACETAMINOPHEN 650 MG: 325 TABLET ORAL at 05:35

## 2025-01-07 RX ADMIN — BUDESONIDE 0.5 MG: 0.5 INHALANT ORAL at 20:10

## 2025-01-07 RX ADMIN — QUETIAPINE FUMARATE 37.5 MG: 25 TABLET ORAL at 20:53

## 2025-01-07 RX ADMIN — IPRATROPIUM BROMIDE AND ALBUTEROL SULFATE 3 ML: 2.5; .5 SOLUTION RESPIRATORY (INHALATION) at 20:10

## 2025-01-07 RX ADMIN — BUDESONIDE 0.5 MG: 0.5 INHALANT ORAL at 06:38

## 2025-01-07 RX ADMIN — FAMOTIDINE 10 MG: 20 TABLET, FILM COATED ORAL at 12:42

## 2025-01-07 RX ADMIN — ACETAMINOPHEN 650 MG: 325 TABLET ORAL at 18:04

## 2025-01-07 RX ADMIN — ARFORMOTEROL TARTRATE 15 MCG: 15 SOLUTION RESPIRATORY (INHALATION) at 06:38

## 2025-01-07 NOTE — PLAN OF CARE
Goal Outcome Evaluation:  Plan of Care Reviewed With: patient        Progress: improving  Outcome Evaluation: Patient in and out of lucidness on ortientation. Sometimes patient is alert and oriented x3 and then only to self. Patient to receive two units of blood during dialysis tomorrow. No signifcant complaints of pain other than right hip to which cream and heating pad was applied. Blood sugars stable, blood pressure running higher, midodrine held per attending MD.

## 2025-01-07 NOTE — NURSING NOTE
Duration of Treatment 3.5 Hours  Access Site AVF  Dialyzer Revaclear   mL/min  Dialysate Temperature (C) 36  Use Crit-Line? No  BFR-As tolerated to a maximum of: 400 mL/min  Prime Dialyzer With NS to Priming Volume? Yes  Dialysate Solution Bath: K+ = 3 mEq, Ca = 2.5mEq  Bicarb Other  Bicarb Comments 32  Na+ 137 meq  Fluid Removal: 1kg    Patient tolerated treatment well but had to get order for restraints during BBC transfusion, due to patient pulling at lines and continually bending his arm making machine alarm. He was fine post treatment and restraints were dc'd  He received 2 units PRBC, ran 3.5hrs, removed 1000mls UF with BVP of79.3  Reported off to Evette MARADIAGA

## 2025-01-07 NOTE — THERAPY TREATMENT NOTE
Acute Care - Physical Therapy Treatment Note  BRIA Sullivan     Patient Name: Nelson Wang  : 1945  MRN: [id number]  Today's Date: 2025      Visit Dx:     ICD-10-CM ICD-9-CM   1. Acute respiratory failure with hypoxia  J96.01 518.81   2. Acute pulmonary edema  J81.0 518.4   3. Difficulty walking  R26.2 719.7   4. Oropharyngeal dysphagia  R13.12 787.22     Patient Active Problem List   Diagnosis    Essential hypertension    Bradycardia, sinus    Anemia due to chronic kidney disease    Hypothyroidism    Idiopathic gout    Proteinuria    Psoriasis    Thrombocytopenia    Type 2 diabetes mellitus without complication    CKD (chronic kidney disease), stage IV    Hyperlipidemia    Monoclonal gammopathy of unknown significance (MGUS)    Stage 5 chronic kidney disease    Chronic gout without tophus    Peritoneal dialysis catheter dysfunction    Medicare annual wellness visit, subsequent    Chronic cough    Peritonitis associated with peritoneal dialysis    Recurrent pneumonia    Acute on chronic respiratory failure with hypoxia    ESRD (end stage renal disease)    Aspiration pneumonitis    Sepsis    Type 2 diabetes mellitus, with long-term current use of insulin    GERD without esophagitis    Immunosuppression due to drug therapy    Bipolar disorder    Chronic respiratory failure with hypoxia    Shortness of breath    Abnormal stress test    ESRD on dialysis    Abnormal chest CT    Encounter for screening for malignant neoplasm of colon    History of colon polyps    Family history of colon cancer    Intractable pain    Abdominal pain    ESRD (end stage renal disease) on dialysis    AMS (altered mental status)    Hallucinations    LUQ pain    Discitis    Metabolic encephalopathy    Aspiration pneumonia    Discitis of lumbar region    Severe malnutrition    Dementia    Unspecified severe protein-calorie malnutrition    Hypoxia    Gram-positive bacteremia     Past Medical History:   Diagnosis Date    Abnormal stress  test     Anemia     IRON INFUSIONS WITH DIALYSIS    Anesthesia     WITH HIP REPLACEMENT DAUGHTER BELIEVES HAD SVT 2000    Ankle pain, right     Anxiety and depression     Arthritis     CKD (chronic kidney disease), stage IV     DIALYSIS [id number] SHILEY IN RIGHT CHEST    Diabetes     GERD (gastroesophageal reflux disease)     Gout     High cholesterol     History of peritoneal dialysis     HL (hearing loss)     Hyperkalemia     Hypertension     Hypothyroidism     Insomnia     Night terrors     Psoriasis     Psoriasis     Sleep apnea     Sleep walking     Thrombocytopenia     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    Vitiligo      Past Surgical History:   Procedure Laterality Date    ABDOMINAL SURGERY      ANKLE SURGERY Right 11/1990    APPENDECTOMY N/A 1954    ARTERIOVENOUS FISTULA/SHUNT SURGERY Left 07/16/2024    Procedure: Creation of left arm arteriovenous fistula;  Surgeon: Moses Mir MD;  Location: [facility] MAIN OR;  Service: Vascular;  Laterality: Left;    BRONCHOSCOPY N/A 03/01/2024    Procedure: BRONCHOSCOPY WITH BAL AND WASHINGS;  Surgeon: Sandra Espinal MD;  Location: [facility] ENDOSCOPY;  Service: Pulmonary;  Laterality: N/A;  PNEUMONIA    BRONCHOSCOPY N/A 08/30/2024    Procedure: BRONCHOSCOPY WITH BALS AND WASHINGS;  Surgeon: Sandra Espinal MD;  Location: [facility] ENDOSCOPY;  Service: Pulmonary;  Laterality: N/A;  MUCOUS PLUGGING    CARDIAC CATHETERIZATION N/A 05/29/2024    Procedure: Left Heart Cath with possible coronary angioplasty;  Surgeon: Stephan Nichols MD;  Location: [facility] CATH INVASIVE LOCATION;  Service: Cardiology;  Laterality: N/A;    COLONOSCOPY N/A 06/2022    [facility]    ENDOSCOPY N/A 10/28/2024    Procedure: ESOPHAGOGASTRODUODENOSCOPY INSERTION OF LIGHTED INSTRUMENT TO VIEW ESOPHAGUS, STOMACH AND SMALL INTESTINE;  Surgeon: Kingsley Peraza MD;  Location: [facility] ENDOSCOPY;  Service: Gastroenterology;  Laterality: N/A;  Gastritis    FRACTURE SURGERY      HIP BIPOLAR  REPLACEMENT Right 01/2000    INSERTION HEMODIALYSIS CATHETER Left 04/09/2024    Procedure: HEMODIALYSIS CATHETER INSERTION;  Surgeon: Jose Berry MD;  Location: [facility] MAIN OR;  Service: General;  Laterality: Left;    INSERTION HEMODIALYSIS CATHETER N/A 04/12/2024    Procedure: Tunneled hemodialysis catheter insertion;  Surgeon: Enrique Vinson MD;  Location: [facility] MA[facility] OR;  Service: Vascular;  Laterality: N/A;    INSERTION PERITONEAL DIALYSIS CATHETER N/A 03/27/2023    Procedure: LAPAROSCOPIC INSERTION PERITONEAL DIALYSIS CATHETER, LAPAROSCOPIC OMENTOPEXY WITH LYSIS OF ADHESIONS;  Surgeon: Jose Berry MD;  Location: [facility] MA[facility] OR;  Service: General;  Laterality: N/A;    INSERTION PERITONEAL DIALYSIS CATHETER Left 07/23/2023    Procedure: REVISION OF PERITONEAL DIALYSIS CATHETER;  Surgeon: Radha Oreilly MD;  Location: [facility] MAIN OR;  Service: General;  Laterality: Left;    JOINT REPLACEMENT      REMOVAL PERITONEAL DIALYSIS CATHETER N/A 04/09/2024    Procedure: REMOVAL PERITONEAL DIALYSIS CATHETER;  Surgeon: Jose Berry MD;  Location: [facility] MA[facility] OR;  Service: General;  Laterality: N/A;    RENAL BIOPSY Left 07/15/2022    UPPER GASTROINTESTINAL ENDOSCOPY      VASCULAR SURGERY      WRIST SURGERY      UNSURE WHICH SIDE DAUGHTER REPORTS HAD SEVERE  CUT FROM WINDOW AND THEY HAD TO DO RECONSTRUCTIVE SURGERY WITH VESSELS AND NERVES     PT Assessment (Last 12 Hours)       PT Evaluation and Treatment       Row Name 01/07/25 0915          Physical Therapy Time and Intention    Subjective Information no complaints  -DK     Document Type therapy note (daily note)  -DK     Mode of Treatment individual therapy;physical therapy  -DK     Patient Effort good  -DK     Symptoms Noted During/After Treatment none  -DK     Comment Pt was rather lethargic, somewhat confused, not resisting as much today with exercises / transfers.  -DK       Row Name 01/07/25 0915          Pain     Pretreatment Pain Rating 0/10 - no pain  -DK     Posttreatment Pain Rating 0/10 - no pain  -DK       Row Name 01/07/25 0915          Cognition    Affect/Mental Status (Cognition) confused;anxious  -DK     Orientation Status (Cognition) oriented to;person  -DK     Follows Commands (Cognition) physical/tactile prompts required;repetition of directions required;verbal cues/prompting required  -DK     Cognitive Function (Cognition) attention deficit  -DK     Attention Deficit (Cognition) minimal deficit;moderate deficit;concentration;focused/sustained attention;arousal/alertness  -DK     Personal Safety Interventions gait belt;nonskid shoes/slippers when out of bed;supervised activity  -DK       Row Name 01/07/25 0915          Bed Mobility    Bed Mobility scooting/bridging;supine-sit-supine  -DK     All Activities, [place] (Bed Mobility) 2 person assist;minimum assist (75% patient effort);moderate assist (50% patient effort)  -DK     Rolling Left [place] (Bed Mobility) minimum assist (75% patient effort);1 person assist  -DK     Rolling Right [place] (Bed Mobility) 1 person assist;minimum assist (75% patient effort)  -DK     Scooting/Bridging [place] (Bed Mobility) moderate assist (50% patient effort);2 person assist  -DK     Supine-Sit [place] (Bed Mobility) moderate assist (50% patient effort);2 person assist  -DK     Sit-Supine [place] (Bed Mobility) moderate assist (50% patient effort);2 person assist  -DK     Supine-Sit-Supine [place] (Bed Mobility) moderate assist (50% patient effort);2 person assist  -DK     Bed Mobility, Safety Issues cognitive deficits limit understanding;decreased use of legs for bridging/pushing  -DK     Assistive Device (Bed Mobility) bed rails;repositioning sheet  -DK     Comment, (Bed Mobility) Pt sat EOB x 3-4 minutes with min assist x 1.  He was returned to bed on alert post treatment.  Pt appeared too lethargic to safely attempt standing this session.   -       Row Name 01/07/25 0915          Transfers    Transfers sit-stand transfer;stand-sit transfer  -       Row Name 01/07/25 0915          Sit-Stand Transfer    Sit-Stand [place] (Transfers) moderate assist (50% patient effort);maximum assist (25% patient effort);2 person assist  -DK     Assistive Device (Sit-Stand Transfers) walker, front-wheeled  -DK       Row Name 01/07/25 0915          Stand-Sit Transfer    Stand-Sit [place] (Transfers) moderate assist (50% patient effort);maximum assist (25% patient effort);2 person assist  -DK     Assistive Device (Stand-Sit Transfers) walker, front-wheeled  -DK       Row Name 01/07/25 0915          Safety Issues/Impairments Affecting Functional Mobility    Safety Issues Affecting Function (Mobility) ability to follow commands;awareness of need for assistance;impulsivity;judgment;safety precaution awareness  -     Impairments Affecting Function (Mobility) balance;cognition;endurance/activity tolerance;strength;shortness of breath;pain  -     Cognitive Impairments, Mobility Safety/Performance attention;awareness, need for assistance;impulsivity;judgment;safety precaution awareness  -       Row Name 01/07/25 0915          Balance    Balance Assessment sitting static balance;sitting dynamic balance  -     Static Sitting Balance minimal assist;1-person assist  -     Dynamic Sitting Balance minimal assist;1-person assist  -DK     Position, Sitting Balance supported;sitting edge of bed  -     Balance Interventions sitting;supported;static;dynamic  -       Row Name 01/07/25 0915          Motor Skills    Motor Skills --  therapeutic exercises  -     Therapeutic Exercise hip;knee;ankle  -       Row Name 01/07/25 0915          Hip (Therapeutic Exercise)    Hip (Therapeutic Exercise) AAROM (active assistive range of motion)  -     Hip AAROM (Therapeutic Exercise) bilateral;flexion;extension;aBduction;aDduction;supine;10 repetitions;2 sets  -        Row Name 01/07/25 0915          Knee (Therapeutic Exercise)    Knee (Therapeutic Exercise) AAROM (active assistive range of motion)  -DK     Knee AAROM (Therapeutic Exercise) bilateral;flexion;extension;supine;10 repetitions;2 sets  -DK       Row Name 01/07/25 0915          Ankle (Therapeutic Exercise)    Ankle (Therapeutic Exercise) AAROM (active assistive range of motion)  -DK     Ankle AAROM (Therapeutic Exercise) bilateral;dorsiflexion;plantarflexion;supine;10 repetitions;2 sets  -DK       Row Name             Wound 11/15/24 1945 Right upper chest    Wound - Properties Group Placement Date: 11/15/24  -JR Placement Time: 1945 -JR Side: Right  -JR Orientation: upper  -JR Location: chest  -JR Primary Wound Type: Incision  -JR    Retired Wound - Properties Group Placement Date: 11/15/24  -JR Placement Time: 1945 -JR Side: Right  -JR Orientation: upper  -JR Location: chest  -JR Primary Wound Type: Incision  -JR    Retired Wound - Properties Group Placement Date: 11/15/24  -JR Placement Time: 1945 -JR Side: Right  -JR Orientation: upper  -JR Location: chest  -JR Primary Wound Type: Incision  -JR    Retired Wound - Properties Group Date first assessed: 11/15/24  -JR Time first assessed: 1945  -JR Side: Right  -JR Location: chest  -JR Primary Wound Type: Incision  -JR      Row Name             Wound 12/05/24 2331 Right lower leg abrasion    Wound - Properties Group Placement Date: 12/05/24  -AW Placement Time: 2331  -AW Side: Right  -AW Orientation: lower  -AW Location: leg  -AW Primary Wound Type: Abrasion  -AW Type: abrasion  -AW Present on Original Admission: Y  -AW    Retired Wound - Properties Group Placement Date: 12/05/24  -AW Placement Time: 2331  -AW Present on Original Admission: Y  -AW Side: Right  -AW Orientation: lower  -AW Location: leg  -AW Primary Wound Type: Abrasion  -AW Type: abrasion  -AW    Retired Wound - Properties Group Placement Date: 12/05/24  -AW Placement Time: 2331 -AW Present on  Original Admission: Y  -AW Side: Right  -AW Orientation: lower  -AW Location: leg  -AW Primary Wound Type: Abrasion  -AW Type: abrasion  -AW    Retired Wound - Properties Group Date first assessed: 12/05/24  -AW Time first assessed: 2331  -AW Present on Original Admission: Y  -AW Side: Right  -AW Location: leg  -AW Primary Wound Type: Abrasion  -AW Type: abrasion  -AW      Row Name             Wound 12/06/24 1440 Left lower leg skin tear    Wound - Properties Group Placement Date: 12/06/24  -KE Placement Time: 1440  -KE Side: Left  -KE Orientation: lower  -KE Location: leg  -KE Primary Wound Type: Skin tear  -KE Type: skin tear  -KE    Retired Wound - Properties Group Placement Date: 12/06/24  -KE Placement Time: 1440  -KE Side: Left  -KE Orientation: lower  -KE Location: leg  -KE Primary Wound Type: Skin tear  -KE Type: skin tear  -KE    Retired Wound - Properties Group Placement Date: 12/06/24  -KE Placement Time: 1440  -KE Side: Left  -KE Orientation: lower  -KE Location: leg  -KE Primary Wound Type: Skin tear  -KE Type: skin tear  -KE    Retired Wound - Properties Group Date first assessed: 12/06/24  -KE Time first assessed: 1440  -KE Side: Left  -KE Location: leg  -KE Primary Wound Type: Skin tear  -KE Type: skin tear  -KE      Row Name             Wound 12/28/24 2100 Right posterior elbow skin tear    Wound - Properties Group Placement Date: 12/28/24  -KM Placement Time: 2100  -KM Side: Right  -KM Orientation: posterior  -KM Location: elbow  -KM Primary Wound Type: Skin tear  -KM Type: skin tear  -KM    Retired Wound - Properties Group Placement Date: 12/28/24  -KM Placement Time: 2100  -KM Side: Right  -KM Orientation: posterior  -KM Location: elbow  -KM Primary Wound Type: Skin tear  -KM Type: skin tear  -KM    Retired Wound - Properties Group Placement Date: 12/28/24  -KM Placement Time: 2100  -KM Side: Right  -KM Orientation: posterior  -KM Location: elbow  -KM Primary Wound Type: Skin tear  -KM Type: skin  tear  -KM    Retired Wound - Properties Group Date first assessed: 12/28/24  -KM Time first assessed: 2100 -KM Side: Right  -KM Location: elbow  -KM Primary Wound Type: Skin tear  -KM Type: skin tear  -KM      Row Name 01/07/25 0915          Plan of Care Review    Plan of Care Reviewed With patient  -DK     Progress no change  -DK       Row Name 01/07/25 0915          Positioning and Restraints    Pre-Treatment Position in bed  -DK     Post Treatment Position bed  -DK     In Bed side lying left;call light within reach;encouraged to call for assist;exit alarm on;patient within staff view;side rails up x3;legs elevated;heels elevated  -DK               User Key  (r) = Recorded By, (t) = Taken By, (c) = Cosigned By      Initials Name Provider Type    JR Stan Henderson, HIREN Registered Nurse    Jackelin Freitas RN Registered Nurse    Neena Weaver, RN Registered Nurse    Farzana Ma RN Registered Nurse    Lia Canales PTA Physical Therapist Assistant                    Physical Therapy Education       Title: PT OT SLP Therapies (In Progress)       Topic: Physical Therapy (Done)       Point: Mobility training (Done)       Learning Progress Summary            Patient Acceptance, E,TB, NR,VU by EC at 1/5/2025 1328    Acceptance, E,TB, VU by AV at 12/17/2024 1454   Family Acceptance, E,TB, NR,VU by EC at 1/5/2025 1328                      Point: Home exercise program (Done)       Learning Progress Summary            Patient Acceptance, E,TB, NR,VU by EC at 1/5/2025 1328   Family Acceptance, E,TB, NR,VU by EC at 1/5/2025 1328                      Point: Body mechanics (Done)       Learning Progress Summary            Patient Acceptance, E,TB, NR,VU by EC at 1/5/2025 1328    Acceptance, E,TB, VU by AV at 12/17/2024 1454   Family Acceptance, E,TB, NR,VU by EC at 1/5/2025 1328                      Point: Precautions (Done)       Learning Progress Summary            Patient Acceptance, E,TB, NR,VU by EC at 1/5/2025  1328    Acceptance, E,TB, VU by AV at 12/17/2024 1454   Family Acceptance, E,TB, NR,VU by EC at 1/5/2025 1328                                      User Key       Initials Effective Dates Name Provider Type Discipline    AV 06/11/21 -  Kaushik Chavez, PT Physical Therapist PT    EC 12/23/24 -  Merry Mckee RNA Registered Nurse Nurse                  PT Recommendation and Plan  Planned Therapy Interventions (PT): balance training, bed mobility training, gait training, strengthening, transfer training  Therapy Frequency (PT): daily  Progress Summary (PT)  Progress Toward Functional Goals (PT): progress toward functional goals is good  Plan of Care Reviewed With: patient  Progress: no change   Outcome Measures       Row Name 01/07/25 0915 01/06/25 0835 01/05/25 0902       How much help from another person do you currently need...    Turning from your back to your side while in flat bed without using bedrails? 3  -DK 2  -DK 2  -DK    Moving from lying on back to sitting on the side of a flat bed without bedrails? 2  -DK 2  -DK 2  -DK    Moving to and from a bed to a chair (including a wheelchair)? 2  -DK 2  -DK 2  -DK    Standing up from a chair using your arms (e.g., wheelchair, bedside chair)? 2  -DK 2  -DK 2  -DK    Climbing 3-5 steps with a railing? 1  -DK 1  -DK 1  -DK    To walk in hospital room? 2  -DK 1  -DK 1  -DK    AM-PAC 6 Clicks Score (PT) 12  -DK 10  -DK 10  -DK       Functional Assessment    Outcome Measure Options AM-PAC 6 Clicks Basic Mobility (PT)  -DK AM-PAC 6 Clicks Basic Mobility (PT)  -DK AM-PAC 6 Clicks Basic Mobility (PT)  -DK              User Key  (r) = Recorded By, (t) = Taken By, (c) = Cosigned By      Initials Name Provider Type    Lia Canales PTA Physical Therapist Assistant                     Time Calculation:    PT Charges       Row Name 01/07/25 0919             Time Calculation    PT Received On 01/07/25  -DK      PT Goal Re-Cert Due Date 01/12/25  -DK         Timed Charges     88112 - PT Therapeutic Exercise Minutes 14  -DK      74445 - PT Therapeutic Activity Minutes 10  -DK         Total Minutes    Timed Charges Total Minutes 24  -DK       Total Minutes 24  -DK                User Key  (r) = Recorded By, (t) = Taken By, (c) = Cosigned By      Initials Name Provider Type    Lia Canales PTA Physical Therapist Assistant                  Therapy Charges for Today       Code Description Service Date Service Provider Modifiers Qty    59146142833 HC PT THER PROC EA 15 MIN 1/6/2025 Lia Escamilla, GREER GP 1    61595205896 HC PT THERAPEUTIC ACT EA 15 MIN 1/6/2025 Lia Escamilla, GREER GP 1    29059789942 HC PT THER PROC EA 15 MIN 1/7/2025 Lia Escamilla, GREER GP 1    90917359335 HC PT THERAPEUTIC ACT EA 15 MIN 1/7/2025 Lia Escamilla, GREER GP 1            PT G-Codes  Outcome Measure Options: AM-PAC 6 Clicks Basic Mobility (PT)  AM-PAC 6 Clicks Score (PT): 12  AM-PAC 6 Clicks Score (OT): 7    Lia Escamilla PTA  1/7/2025

## 2025-01-07 NOTE — PROGRESS NOTES
[facility]     Nephrology Progress Note      Patient Name: Nelson Wang  : 1945  MRN: [id number]  Primary Care Physician:  Domingo Bravo MD  Date of admission: 2024    Subjective   Subjective     Interval History:  Awake and alert, confused.  No distress, sitter at bedside. Tolerating some p.o.      Objective   Objective     Vitals:   Temp:  [97.3 °F (36.3 °C)-98.6 °F (37 °C)] 97.7 °F (36.5 °C)  Heart Rate:  [81-97] 97  Resp:  [16-20] 16  BP: (116-151)/(53-97) 148/78  Flow (L/min) (Oxygen Therapy):  [1-2] 1  Physical Exam:   constitutional: Awake, alert, confused, mildly restless.    Eyes: sclerae anicteric, no conjunctival injection   HENT: mucous membranes moist.     Neck: Supple, no thyromegaly, no lymphadenopathy, trachea midline, No JVD   Respiratory: Decreased to auscultation bilaterally, nonlabored respirations.   Cardiovascular: RRR, no murmurs, rubs, or gallops.   Gastrointestinal: Positive bowel sounds, soft,  nondistended   Musculoskeletal: No edema, no clubbing or cyanosis.  Left upper extremity AV fistula, patent.   Neurologic: moving extremities, answering questions but inconsistently.  Incoherent at times.   Skin: warm and dry, no rashes, areas of hypopigmentation.    Result Review    Result Reviewed:  I have personally reviewed the results from the time of this admission to 2025 14:31 EST and agree with these findings:  [x]  Laboratory  []  Microbiology  [x]  Radiology  []  EKG/Telemetry   []  Cardiology/Vascular   []  Pathology  [x]  Old records  []  Other:        Lab 25  0518 25  0518 25  1002 25  0844 25  0900 25  0559 25  0544   SODIUM 137 135* 135* 138 132* 140 135*   POTASSIUM 4.7 4.3 4.5 4.4 4.8 5.0 4.5   CHLORIDE 100 100 99 98 97* 102 96*   CO2 27.1 21.7* 26.7 28.7 22.3 29.4* 28.1   BUN 47* 32* 20 43* 25* 38* 23   CREATININE 5.06* 3.88* 2.95* 4.93* 3.34* 5.94* 4.28*   GLUCOSE 92 86 134* 132* 140* 118* 128*   EGFR 11.0*  15.1* 21.0* 11.4* 18.1* 9.1* 13.5*   ANION GAP 9.9 13.3 9.3 11.3 12.7 8.6 10.9   PHOSPHORUS 4.5 3.6 3.5 5.3* 2.5  --   --            Assessment & Plan   Assessment / Plan       Active Hospital Problems:  Active Hospital Problems    Diagnosis     **Hypoxia     Gram-positive bacteremia     ESRD (end stage renal disease)     Essential hypertension     Type 2 diabetes mellitus without complication      Assessment and Plan:    - ESRD, was on home dialysis prior to admission, has left upper extremity AV fistula for access.  Electrolytes are stable.  Dialysis TTS for now.  Now looking for rehab placement.  May be going home now with home hemo.        - Aspiration pneumonia, and possibly recurrent aspiration, per pulmonary.      - Type 2 diabetes with complications.     - Hypotension blood pressure is acceptable now, on ProAmatine and blood pressure stable.     - Anemia of chronic kidney disease, was on RONALD as outpatient.  Resume Epogen 30,000 units subcu weekly, first dose today.  Iron studies this morning: Iron saturation 24% and ferritin 1500.       - Altered mentation.  Remains incoherent. Unlikely uremia given consistent dialysis to this point.

## 2025-01-07 NOTE — PROGRESS NOTES
[facility]   Hospitalist Progress Note  Date: 2025  Patient Name: Nelson Wang  : 1945  MRN: [id number]  Date of admission: 2024      Subjective   Subjective     Chief Complaint: Follow-up shortness of breath    Summary:Nelson Wang is a 79 y.o. 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 Patient was started on supplemental oxygen to maintain his oxygen saturations. He was given a dose of Bumex overnight. His chest x-ray was consistent with bilateral pleural effusions. Pulmonology was consulted for the bilateral pleural effusions and and nephrology consulted for possible volume overload. Pulmonology was performing a thoracentesis this morning. Thoracentesis was showing bloody fluid. Patient suffered CODE BLUE. Likely bleeding into his airways causing hypoxic arrest. He received CPR for 6 minutes and achieved ROSC. He was transferred to the ICU, intubated and on blood pressure support. Bronchoscopy was showing blood clots in his airways. Patient was started on CRRT. Patient's medical status is tenuous. He is currently on 1 pressor. CRRT is removing fluid at a slow rate. Patient started on micafungin on 2024. Patient extubated on 2024. Patient on high flow nasal cannula 30 L 25%. Slowly weaning down on supplemental oxygen, breathing more comfortably. Poor mental status still waxing and waning suspect underlying dementia with history of alcohol abuse, mixed with hospital delirium and hyperactive delirium.  Patient continues on hemodialysis with midodrine on dialysis days has required extra Seroquel and occasional as needed medications for sedation to allow him to tolerate hemodialysis.  Working on mentation and initially trying to get back home though patient had a fall on the evening of 2024 and has had difficulty with  transfers and ambulation.  No fractures on radiographs.  Patient developed fever tachycardia.  Chest CT demonstrated worsening right-sided infiltrate suspect aspiration.  Started on empiric antibiotics for aspiration pneumonia.  Diet downgraded by speech.  Hemoglobin trended down planning on blood transfusion with dialysis on 1/7/2025 as patient has lost IV access and refused replacement.  Family planning on discharging home with 24/7 care on Wednesday, 1/8/2025.    Interval Followup:    Patient seen on dialysis.  Patient is getting blood with dialysis.  Patient continues to require a bedside sitter.  Patient's blood pressure remained stable.  Patient stating that he is hungry and wanting some snacks    Review of Systems  Limited due to mental status     Objective   Objective     Vitals:   Temp:  [97.3 °F (36.3 °C)-98.6 °F (37 °C)] 97.3 °F (36.3 °C)  Heart Rate:  [86-97] 86  Resp:  [16-20] 18  BP: (116-151)/(53-87) 128/53  Flow (L/min) (Oxygen Therapy):  [1-2] 1  Physical Exam   General: Chronically ill-appearing and frail.  Patient seen in dialysis.  Sitter at the bedside  HEENT: Normocephalic atraumatic moist membranes pupils equal round reactive light, no scleral icterus no conjunctival injection  Cardiovascular: Irregularly irregular, rate controlled  Pulmonary: clear, no w/r/r   Gastrointestinal: Soft nontender nondistended positive bowel sounds all 4 quadrants no rebound or guarding  Musculoskeletal: Full rom   Skin: Clean dry without rashes  Neuro: no focal deficits noted   Psych: Patient is calm cooperative and appropriate with exam not responding to internal stimuli      Result Review    Result Review:  I have personally reviewed these results and agree with these findings:  [x]  Laboratory  LAB RESULTS:      Lab 01/07/25  0518 01/06/25  0518 01/05/25  1002 01/04/25  0844 01/03/25  0900   WBC 4.11 4.22 5.40 6.06 5.18   HEMOGLOBIN 7.2* 6.9* 7.0* 7.1* 7.5*   HEMATOCRIT 24.9* 24.8* 24.3* 24.5* 26.9*    PLATELETS 220 193 192 199 179   MCV 91.2 95.0 92.7 90.7 93.4         Lab 01/07/25  0518 01/06/25  0518 01/05/25  1002 01/04/25  0844 01/03/25  0900   SODIUM 137 135* 135* 138 132*   POTASSIUM 4.7 4.3 4.5 4.4 4.8   CHLORIDE 100 100 99 98 97*   CO2 27.1 21.7* 26.7 28.7 22.3   ANION GAP 9.9 13.3 9.3 11.3 12.7   BUN 47* 32* 20 43* 25*   CREATININE 5.06* 3.88* 2.95* 4.93* 3.34*   EGFR 11.0* 15.1* 21.0* 11.4* 18.1*   GLUCOSE 92 86 134* 132* 140*   CALCIUM 10.3 9.8 9.6 9.8 9.4   PHOSPHORUS 4.5 3.6 3.5 5.3* 2.5         Lab 01/07/25  0518 01/06/25  0518 01/05/25 1002 01/04/25  0844 01/03/25  0900   ALBUMIN 2.9* 2.6* 2.8* 3.0* 2.5*         Lab 01/03/25  0900   PROBNP 19,861.0*             Lab 01/06/25  0601 01/06/25  0518   IRON  --  47*   IRON SATURATION (TSAT)  --  24   TIBC  --  197*   TRANSFERRIN  --  132*   FERRITIN  --  1,517.00*   ABO TYPING AB  --    RH TYPING Positive  --    ANTIBODY SCREEN Negative  --          Brief Urine Lab Results  (Last result in the past 365 days)        Color   Clarity   Blood   Leuk Est   Nitrite   Protein   CREAT   Urine HCG        10/27/24 1417 Yellow   Cloudy   Negative   Trace   Negative   >=300 mg/dL (3+)                 Microbiology Results (last 10 days)       Procedure Component Value - Date/Time    Blood Culture - Blood, Arm, Right [[id number]]  (Normal) Collected: 01/02/25 2157    Lab Status: Preliminary result Specimen: Blood from Arm, Right Updated: 01/06/25 2215     Blood Culture No growth at 4 days    Narrative:      Less than seven (7) mL's of blood was collected.  Insufficient quantity may yield false negative results.    Blood Culture - Blood, Arm, Right [[id number]]  (Normal) Collected: 01/02/25 2157    Lab Status: Preliminary result Specimen: Blood from Arm, Right Updated: 01/06/25 2215     Blood Culture No growth at 4 days    Narrative:      Less than seven (7) mL's of blood was collected.  Insufficient quantity may yield false negative results.    Respiratory Panel PCR  w/COVID-19(SARS-CoV-2) RA/[facility] In-House, NP Swab in UTM/VTM, 2 HR TAT - Swab, Nasopharynx [[id number]]  (Normal) Collected: 01/02/25 2117    Lab Status: Final result Specimen: Swab from Nasopharynx Updated: 01/02/25 2225     ADENOVIRUS, PCR Not Detected     Coronavirus 229E Not Detected     Coronavirus HKU1 Not Detected     Coronavirus NL63 Not Detected     Coronavirus OC43 Not Detected     COVID19 Not Detected     Human Metapneumovirus Not Detected     Human Rhinovirus/Enterovirus Not Detected     Influenza A PCR Not Detected     Influenza B PCR Not Detected     Parainfluenza Virus 1 Not Detected     Parainfluenza Virus 2 Not Detected     Parainfluenza Virus 3 Not Detected     Parainfluenza Virus 4 Not Detected     RSV, PCR Not Detected     Bordetella pertussis pcr Not Detected     Bordetella parapertussis PCR Not Detected     Chlamydophila pneumoniae PCR Not Detected     Mycoplasma pneumo by PCR Not Detected    Narrative:      In the setting of a positive respiratory panel with a viral infection PLUS a negative procalcitonin without other underlying concern for bacterial infection, consider observing off antibiotics or discontinuation of antibiotics and continue supportive care. If the respiratory panel is positive for atypical bacterial infection (Bordetella pertussis, Chlamydophila pneumoniae, or Mycoplasma pneumoniae), consider antibiotic de-escalation to target atypical bacterial infection.            [x]  Microbiology  [x]  Radiology  CT Chest Without Contrast Diagnostic    Result Date: 1/3/2025   1. Interval decrease in, if not resolution of, the left pleural effusion. Interval slight decrease in size of the right pleural effusion.  2. There may be interval increase in airspace opacification on the right. Decreased airspace disease is seen on the left.  3. There is moderate cardiomegaly, as before.  4. The study is motion-limited.  5. 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 These findings may contribute to the infectious process within the thorax, especially the pleural effusions. Empyema(s), especially on the right, cannot be excluded. No ectopic gas foci are seen in these areas. Paravertebral abscess or phlegmon, especially on the right, cannot be excluded. Thoracic spine MRI examination without and with intravenous gadolinium-based contrast agent (GBCA) administration may be helpful in further imaging assessment if clinically warranted and if not contraindicated.  6. Please see above comments for further detail.    Portions of this note were completed with a voice recognition program.  Electronically Signed By-Stan Hogan MD On:1/3/2025 1:24 AM      XR Chest 1 View    Result Date: 1/1/2025  Impression: The nasogastric tube has been removed. Otherwise no significant change from previous examination. Low lung volumes, cardiomegaly, small right pleural effusion and patchy bibasilar airspace opacities, atelectasis versus pneumonia. Electronically Signed: Solange Iverson MD  1/1/2025 2:26 PM EST  Workstation ID: [id number]    CT Head Without Contrast    Result Date: 12/30/2024  Impression: 1. No intracranial hemorrhage or acute intracranial abnormality.. 2. Generalized cerebral atrophy with moderate white matter findings of chronic microvascular disease. Electronically Signed: Lasha Ramos MD  12/30/2024 10:38 PM EST  Workstation ID: [id number]    XR Ankle 2 View Right    Result Date: 12/30/2024  Impression: 1. Negative for acute fracture. 2. Stable chronic findings above. Electronically Signed: Lasha Ramos MD  12/30/2024 3:29 PM EST  Workstation ID: [id number]     []  EKG/Telemetry   []  Cardiology/Vascular   []  Pathology  []  Old records  [x]  Other:  Scheduled Meds:amoxicillin-clavulanate, 1 tablet, Oral, BID  arformoterol, 15 mcg, Nebulization, BID - RT  budesonide, 0.5 mg, Nebulization,  BID - RT  epoetin trevor/trevor-epbx, 30,000 Units, Subcutaneous, Weekly  famotidine, 10 mg, Oral, Daily  insulin lispro, 2-7 Units, Subcutaneous, BID AC  ipratropium-albuterol, 3 mL, Nebulization, BID - RT  levothyroxine, 175 mcg, Oral, Q AM  melatonin, 2.5 mg, Oral, Nightly  midodrine, 10 mg, Oral, Once per day on Tuesday Thursday Saturday  midodrine, 2.5 mg, Oral, TID AC  mineral oil-hydrophilic petrolatum, 1 Application, Topical, Daily  QUEtiapine, 37.5 mg, Oral, Nightly  sodium chloride, 10 mL, Intravenous, Q12H      Continuous Infusions:   PRN Meds:.  acetaminophen    albuterol    artificial tears    senna-docusate sodium **AND** polyethylene glycol **AND** [DISCONTINUED] bisacodyl **AND** bisacodyl    calamine    dextrose    dextrose    Diclofenac Sodium    glucagon (human recombinant)    guaifenesin    heparin (porcine)    heparin (porcine)    hydrocortisone ace-pramoxine    Lidocaine    ondansetron    Polyvinyl Alcohol-Povidone PF    QUEtiapine    sodium chloride    sodium chloride    sodium chloride    trolamine salicylate      Assessment & Plan   Assessment / Plan     Assessment/Plan:  Suspected aspiration pneumonia  Cardiac arrest secondary to hypoxemia in setting of thoracentesis  Acute hypoxic/hypercapnic respiratory failure requiring mechanical ventilation  Acute metabolic encephalopathy/altered mental status, initial concern for seizures, EEG negative x 2  Acute on chronic diastolic heart failure  Acute cardiogenic pulmonary edema  Bilateral pleural effusions  Hemoptysis  Candidemia s/p iv micafungin course completed 12/22/24  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  Atrial fibrillation on Eliquis  ESRD on home HD   Type 2 diabetes  Mild nonobstructive CAD per 5/29/2024 cath  Suspect underlying dementia with episodes of possible delirium and hyperactive delirium  Fall on 12-28  Normocytic anemia  Concern for aspiration  Hypothyroidism          Patient admitted for further evaluation  and treatment  Nephrology, psychiatry and pulmonology critical care consulted thank you for your assistance  Continue Augmentin renally dosed complete 5-day course for coverage of aspiration pneumonia as patient refusing replacement of IV  Completed course of azithromycin  Continue scheduled DuoNebs  Continue supplemental oxygen titrate to keep sats greater than 90%  Continue to encourage pulmonary toilet  Continue bronchopulmonary hygiene protocol  Continue to encourage incentive spirometry  Continue aspiration precautions. Continue speech therapy  Downgrade diet to puréed with thin liquids  Continue Brovana, Pulmicort scheduled with as needed DuoNebs  Continue hemodialysis and ultrafiltration per nephrology  Continue midodrine on dialysis days   Continue to hold Eliquis due to hemoptysis and hemothorax  Continue sliding scale insulin  Continue Seroquel 37.5 mg at night and titrate as needed  Continue physical therapy Occupational Therapy  Continue monitor anemia and transfuse as needed keep hemoglobin greater than 7  Transfuse 2 units packed red blood cells with dialysis today   Continue levothyroxine  Further inpatient orders or recommendations pending clinical course         Discussed plan with bedside RN wellness Dr. Castro nephrology attending.    Disposition: Home tomorrow     VTE Prophylaxis:  Pharmacologic & mechanical VTE prophylaxis orders are present.        CODE STATUS:   Level Of Support Discussed With: Next of Kin (If No Surrogate)  Code Status (Patient has no pulse and is not breathing): No CPR (Do Not Attempt to Resuscitate)  Medical Interventions (Patient has pulse or is breathing): Full Support

## 2025-01-07 NOTE — PLAN OF CARE
Goal Outcome Evaluation:  Plan of Care Reviewed With: patient        Progress: improving  Outcome Evaluation: Pt A&Ox1-2 this shift. VSS. Safety watch at bedside. Pt did sleep for a couple hours this shift. No c/o pain - prn tylenol given x2. Pt still has infrequent, non productive cough. Pt to get 2 units of blood in dialysis per orders this shift. Pt is restless when awake. Crackles heard posteriorly, lower lobes only. Remains on 1L NC, pt frequently attmepts to remove nasal cannula. Pt drops to low 80s without oxygen. At this time no further issues/needs.

## 2025-01-08 ENCOUNTER — READMISSION MANAGEMENT (OUTPATIENT)
Dept: CALL CENTER | Facility: HOSPITAL | Age: 80
End: 2025-01-08
Payer: MEDICARE

## 2025-01-08 LAB
BH BB BLOOD EXPIRATION DATE: NORMAL
BH BB BLOOD EXPIRATION DATE: NORMAL
BH BB BLOOD TYPE BARCODE: 1700
BH BB BLOOD TYPE BARCODE: 5100
BH BB DISPENSE STATUS: NORMAL
BH BB DISPENSE STATUS: NORMAL
BH BB PRODUCT CODE: NORMAL
BH BB PRODUCT CODE: NORMAL
BH BB UNIT NUMBER: NORMAL
BH BB UNIT NUMBER: NORMAL
CROSSMATCH INTERPRETATION: NORMAL
CROSSMATCH INTERPRETATION: NORMAL
GLUCOSE BLDC GLUCOMTR-MCNC: 80 MG/DL (ref 70–99)
UNIT  ABO: NORMAL
UNIT  ABO: NORMAL
UNIT  RH: NORMAL
UNIT  RH: NORMAL

## 2025-01-08 PROCEDURE — 94799 UNLISTED PULMONARY SVC/PX: CPT

## 2025-01-08 PROCEDURE — 94664 DEMO&/EVAL PT USE INHALER: CPT

## 2025-01-08 PROCEDURE — 94761 N-INVAS EAR/PLS OXIMETRY MLT: CPT

## 2025-01-08 PROCEDURE — 99239 HOSP IP/OBS DSCHRG MGMT >30: CPT | Performed by: INTERNAL MEDICINE

## 2025-01-08 PROCEDURE — 82948 REAGENT STRIP/BLOOD GLUCOSE: CPT | Performed by: INTERNAL MEDICINE

## 2025-01-08 RX ORDER — IPRATROPIUM BROMIDE AND ALBUTEROL SULFATE 2.5; .5 MG/3ML; MG/3ML
3 SOLUTION RESPIRATORY (INHALATION)
Qty: 180 ML | Refills: 0 | Status: ON HOLD | OUTPATIENT
Start: 2025-01-08 | End: 2025-02-07

## 2025-01-08 RX ORDER — QUETIAPINE FUMARATE 25 MG/1
37.5 TABLET, FILM COATED ORAL NIGHTLY
Qty: 45 TABLET | Refills: 0 | Status: ON HOLD | OUTPATIENT
Start: 2025-01-08 | End: 2025-02-07

## 2025-01-08 RX ORDER — ARFORMOTEROL TARTRATE 15 UG/2ML
15 SOLUTION RESPIRATORY (INHALATION)
Qty: 120 ML | Refills: 0 | Status: ON HOLD | OUTPATIENT
Start: 2025-01-08 | End: 2025-02-07

## 2025-01-08 RX ORDER — BUDESONIDE 0.5 MG/2ML
0.5 INHALANT ORAL
Qty: 120 ML | Refills: 0 | Status: ON HOLD | OUTPATIENT
Start: 2025-01-08 | End: 2025-02-07

## 2025-01-08 RX ADMIN — LEVOTHYROXINE SODIUM 175 MCG: 0.03 TABLET ORAL at 05:16

## 2025-01-08 RX ADMIN — WHITE PETROLATUM 1 APPLICATION: 1.75 OINTMENT TOPICAL at 09:12

## 2025-01-08 RX ADMIN — ACETAMINOPHEN 650 MG: 325 TABLET ORAL at 05:16

## 2025-01-08 RX ADMIN — IPRATROPIUM BROMIDE AND ALBUTEROL SULFATE 3 ML: 2.5; .5 SOLUTION RESPIRATORY (INHALATION) at 06:03

## 2025-01-08 RX ADMIN — ARFORMOTEROL TARTRATE 15 MCG: 15 SOLUTION RESPIRATORY (INHALATION) at 06:03

## 2025-01-08 RX ADMIN — FAMOTIDINE 10 MG: 20 TABLET, FILM COATED ORAL at 09:12

## 2025-01-08 RX ADMIN — BUDESONIDE 0.5 MG: 0.5 INHALANT ORAL at 06:03

## 2025-01-08 NOTE — OUTREACH NOTE
Prep Survey      Flowsheet Row Responses   [facility] patient discharged from? [facility]   Is LACE score < 7 ? No   Eligibility [facility]   Date of Admission 12/05/24   Date of Discharge 01/08/25   Discharge Disposition [facility] [place]   Discharge diagnosis **Hypoxia   Does the patient have one of the following disease processes/diagnoses(primary or secondary)? Other   Does the patient have Home health ordered? Yes   What is the Home health agency?  [facility]   Is there a DME ordered? No   Prep survey completed? Yes            GIANNA STEVENS - Registered Nurse

## 2025-01-08 NOTE — DISCHARGE SUMMARY
[facility]         HOSPITALIST  DISCHARGE SUMMARY    Patient Name: Nelson Wang  : 1945  MRN: [id number]    Date of Admission: 2024  Date of Discharge:  2025    Primary Care Physician: Domingo Bravo MD    Consults       Date and Time Order Name Status Description    2024  4:02 PM Inpatient Psychiatrist Consult      2024  8:48 AM Inpatient Psychiatrist Consult      2024  6:44 PM Inpatient Nephrology Consult Completed     2024  5:49 PM Inpatient Hospitalist Consult      2024  4:31 PM Inpatient Pulmonology Consult Completed     2024  4:31 PM Inpatient Nephrology Consult      2024  2:09 PM Inpatient Neurology Consult General Completed     11/3/2024  9:37 AM Inpatient Vascular Surgery Consult Completed     2024  6:43 AM Inpatient Infectious Diseases Consult Completed     2024  2:06 AM Inpatient Psychiatrist Consult Completed     2024  2:06 AM Inpatient Neurosurgery Consult Completed             Active and Resolved Hospital Problems:  Active Hospital Problems    Diagnosis POA    **Hypoxia [R09.02] Yes    Gram-positive bacteremia [R78.81] Unknown    ESRD (end stage renal disease) [N18.6] Yes    Essential hypertension [I10] Yes    Type 2 diabetes mellitus without complication [E11.9] Yes      Resolved Hospital Problems   No resolved problems to display.       Hospital Course     Hospital Course:  Nelson Wang is a 79 y.o. 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 Patient was started on supplemental oxygen to maintain his oxygen saturations. He was given diuresis.  His chest x-ray was consistent with bilateral pleural effusions. Pulmonology was consulted for the bilateral pleural effusions and and nephrology consulted for possible volume overload. Pulmonology performed a  thoracentesis which showed bloody fluid. Patient suffered CODE BLUE. Likely bleeding into his airways causing hypoxic arrest. He received CPR for 6 minutes and achieved ROSC. He was transferred to the ICU, intubated and on blood pressure support. Bronchoscopy was showing blood clots in his airways. Patient was started on CRRT. Patient started on micafungin on 12/7/2024. Patient extubated on 12/8/2024. Patient on high flow nasal cannula 30 L 25%.  Patient was able to be weaned down on his oxygen eventually to room air.  Patient continued with dialysis per nephrology.  Patient required Seroquel at night for sleep which she is on at home however dose was slightly decreased.  Patient was continued on midodrine on his hemodialysis days.  Patient did complete a course of his IV vancomycin for his discitis.  Patient did complete a course of IV micafungin also.  Patient's Keppra was discontinued as there was concern it was contributing to his decreased mentation.  Patient's EEG from his prior hospitalization and November did not show any seizure activity.  Patient was discharged home on December 28.  Patient was supposed to go home after dialysis however later that evening patient had a fall and has had difficulty with transfers and ambulation.  No fractures on radiographs.  Patient developed fever tachycardia.  Chest CT demonstrated worsening right-sided infiltrate suspect aspiration.  Started on empiric antibiotics for aspiration pneumonia.  Diet downgraded by speech.  Hemoglobin trended down and patient did receive a blood transfusion with dialysis on January 7.  At this time patient has recovered from his fall and is able to ambulate and transfer better with health.  Family request patient be discharged home today.  Patient will be discharged home with family today in stable condition.  Long-term prognosis is extremely poor     DISCHARGE Follow Up Recommendations for labs and diagnostics:   Follow up with specialist as  indicated      Day of Discharge     Vital Signs:  Temp:  [97.3 °F (36.3 °C)-97.7 °F (36.5 °C)] 97.3 °F (36.3 °C)  Heart Rate:  [] 90  Resp:  [16-18] 18  BP: (121-169)/(53-97) 148/63  Physical Exam:   General: Chronically ill-appearing and frail.   Sitter at the bedside  HEENT: Normocephalic atraumatic moist membranes pupils equal round reactive light, no scleral icterus no conjunctival injection  Cardiovascular: Irregularly irregular, rate controlled  Pulmonary: clear, no w/r/r   Gastrointestinal: Soft nontender nondistended positive bowel sounds all 4 quadrants no rebound or guarding  Musculoskeletal: Full rom   Skin: Clean dry without rashes  Neuro: no focal deficits noted   Psych: Patient is calm however confused          Discharge Details        Discharge Medications        New Medications        Instructions Start Date   arformoterol 15 MCG/2ML nebulizer solution  Commonly known as: BROVANA   15 mcg, Nebulization, 2 Times Daily - RT      budesonide 0.5 MG/2ML nebulizer solution  Commonly known as: PULMICORT   0.5 mg, Nebulization, 2 Times Daily - RT      ipratropium-albuterol 0.5-2.5 mg/3 ml nebulizer  Commonly known as: DUO-NEB   3 mL, Nebulization, 2 Times Daily - RT             Changes to Medications        Instructions Start Date   QUEtiapine 25 MG tablet  Commonly known as: SEROquel  What changed:   medication strength  how much to take  how to take this  when to take this  additional instructions   37.5 mg, Oral, Nightly             Continue These Medications        Instructions Start Date   atorvastatin 40 MG tablet  Commonly known as: LIPITOR   40 mg, Oral, Daily      famotidine 20 MG tablet  Commonly known as: Pepcid   20 mg, Oral, Daily      Levothyroxine Sodium 175 MCG capsule   175 mcg, Oral, Daily      midodrine 5 MG tablet  Commonly known as: PROAMATINE   5 mg      MIRCERA IJ   Once As Needed      sevelamer 0.8 g pack packet  Commonly known as: RENVELA   800 mg, 3 Times Daily With Meals              Stop These Medications      Breztri Aerosphere 160-9-4.8 MCG/ACT aerosol inhaler  Generic drug: Budeson-Glycopyrrol-Formoterol     sertraline 100 MG tablet  Commonly known as: ZOLOFT     torsemide 100 MG tablet  Commonly known as: DEMADEX     VANCOMYCIN PHARMACY INTERMITTENT/PULSE DOSING              No Known Allergies    Discharge Disposition:  Home-Health Care [facility]    Diet:  Hospital:  Diet Order   Procedures    Diet: Renal; Low Potassium; No Straw; Texture: Pureed (NDD 1); Fluid Consistency: Thin (IDDSI 0)       Discharge Activity: up with assistance      CODE STATUS:  Code Status and Medical Interventions: No CPR (Do Not Attempt to Resuscitate); Full Support   Ordered at: 12/06/24 1256     Level Of Support Discussed With:    Next of Kin (If No Surrogate)     Code Status (Patient has no pulse and is not breathing):    No CPR (Do Not Attempt to Resuscitate)     Medical Interventions (Patient has pulse or is breathing):    Full Support         Future Appointments   Date Time Provider Department Center   1/10/2025 10:45 AM Domingo Bravo MD [facility] IMP ETWN [facility]   1/27/2025  9:00 AM Candace Das MD [facility] NS RA RA   2/13/2025 10:45 AM Sandra Espinal MD [facility] PCC ETW [facility]           Pertinent  and/or Most Recent Results     PROCEDURES:   See chart     LAB RESULTS:      Lab 01/07/25 0518 01/06/25 0518 01/05/25  1002 01/04/25  0844 01/03/25  0900   WBC 4.11 4.22 5.40 6.06 5.18   HEMOGLOBIN 7.2* 6.9* 7.0* 7.1* 7.5*   HEMATOCRIT 24.9* 24.8* 24.3* 24.5* 26.9*   PLATELETS 220 193 192 199 179   MCV 91.2 95.0 92.7 90.7 93.4         Lab 01/07/25 0518 01/06/25 0518 01/05/25  1002 01/04/25  0844 01/03/25  0900   SODIUM 137 135* 135* 138 132*   POTASSIUM 4.7 4.3 4.5 4.4 4.8   CHLORIDE 100 100 99 98 97*   CO2 27.1 21.7* 26.7 28.7 22.3   ANION GAP 9.9 13.3 9.3 11.3 12.7   BUN 47* 32* 20 43* 25*   CREATININE 5.06* 3.88* 2.95* 4.93* 3.34*   EGFR 11.0* 15.1* 21.0* 11.4* 18.1*   GLUCOSE 92 86 134* 132* 140*   CALCIUM 10.3 9.8  9.6 9.8 9.4   PHOSPHORUS 4.5 3.6 3.5 5.3* 2.5         Lab 01/07/25  0518 01/06/25  0518 01/05/25  1002 01/04/25  0844 01/03/25  0900   ALBUMIN 2.9* 2.6* 2.8* 3.0* 2.5*         Lab 01/03/25  0900   PROBNP 19,861.0*             Lab 01/07/25  1136 01/06/25  0601 01/06/25  0601 01/06/25  0518   IRON  --   --   --  47*   IRON SATURATION (TSAT)  --   --   --  24   TIBC  --   --   --  197*   TRANSFERRIN  --   --   --  132*   FERRITIN  --   --   --  1,517.00*   ABO TYPING AB   < > AB  --    RH TYPING Positive   < > Positive  --    ANTIBODY SCREEN Negative  --  Negative  --     < > = values in this interval not displayed.         Brief Urine Lab Results  (Last result in the past 365 days)        Color   Clarity   Blood   Leuk Est   Nitrite   Protein   CREAT   Urine HCG        10/27/24 1417 Yellow   Cloudy   Negative   Trace   Negative   >=300 mg/dL (3+)                 Microbiology Results (last 10 days)       Procedure Component Value - Date/Time    Blood Culture - Blood, Arm, Right [[id number]]  (Normal) Collected: 01/02/25 2157    Lab Status: Final result Specimen: Blood from Arm, Right Updated: 01/07/25 2215     Blood Culture No growth at 5 days    Narrative:      Less than seven (7) mL's of blood was collected.  Insufficient quantity may yield false negative results.    Blood Culture - Blood, Arm, Right [[id number]]  (Normal) Collected: 01/02/25 2157    Lab Status: Final result Specimen: Blood from Arm, Right Updated: 01/07/25 2215     Blood Culture No growth at 5 days    Narrative:      Less than seven (7) mL's of blood was collected.  Insufficient quantity may yield false negative results.    Respiratory Panel PCR w/COVID-19(SARS-CoV-2) RA/[facility]ANIA/GAVIN/PAD/[facility] In-House, NP Swab in UTM/VTM, 2 HR TAT - Swab, Nasopharynx [[id number]]  (Normal) Collected: 01/02/25 2117    Lab Status: Final result Specimen: Swab from Nasopharynx Updated: 01/02/25 2225     ADENOVIRUS, PCR Not Detected     Coronavirus 229E Not Detected      Coronavirus HKU1 Not Detected     Coronavirus NL63 Not Detected     Coronavirus OC43 Not Detected     COVID19 Not Detected     Human Metapneumovirus Not Detected     Human Rhinovirus/Enterovirus Not Detected     Influenza A PCR Not Detected     Influenza B PCR Not Detected     Parainfluenza Virus 1 Not Detected     Parainfluenza Virus 2 Not Detected     Parainfluenza Virus 3 Not Detected     Parainfluenza Virus 4 Not Detected     RSV, PCR Not Detected     Bordetella pertussis pcr Not Detected     Bordetella parapertussis PCR Not Detected     Chlamydophila pneumoniae PCR Not Detected     Mycoplasma pneumo by PCR Not Detected    Narrative:      In the setting of a positive respiratory panel with a viral infection PLUS a negative procalcitonin without other underlying concern for bacterial infection, consider observing off antibiotics or discontinuation of antibiotics and continue supportive care. If the respiratory panel is positive for atypical bacterial infection (Bordetella pertussis, Chlamydophila pneumoniae, or Mycoplasma pneumoniae), consider antibiotic de-escalation to target atypical bacterial infection.            CT Chest Without Contrast Diagnostic    Result Date: 1/3/2025   1. Interval decrease in, if not resolution of, the left pleural effusion. Interval slight decrease in size of the right pleural effusion.  2. There may be interval increase in airspace opacification on the right. Decreased airspace disease is seen on the left.  3. There is moderate cardiomegaly, as before.  4. The study is motion-limited.  5. 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 These findings may contribute to the infectious process within the thorax, especially the pleural effusions. Empyema(s), especially on the right, cannot be excluded. No ectopic gas foci are seen in these areas. Paravertebral abscess or phlegmon,  especially on the right, cannot be excluded. Thoracic spine MRI examination without and with intravenous gadolinium-based contrast agent (GBCA) administration may be helpful in further imaging assessment if clinically warranted and if not contraindicated.  6. Please see above comments for further detail.    Portions of this note were completed with a voice recognition program.  Electronically Signed By-Stan Hogan MD On:1/3/2025 1:24 AM      XR Chest 1 View    Result Date: 1/1/2025  Impression: The nasogastric tube has been removed. Otherwise no significant change from previous examination. Low lung volumes, cardiomegaly, small right pleural effusion and patchy bibasilar airspace opacities, atelectasis versus pneumonia. Electronically Signed: Solange Iverson MD  1/1/2025 2:26 PM EST  Workstation ID: [id number]      Results for orders placed during the hospital encounter of 11/01/24    Duplex Hemodialysis Access CAR    Interpretation Summary    Patent left arteriovenous fistula with normal volume flows.  Moderate arterial anastomotic and proximal inflow stenosis with chronic septum noted.  Otherwise widely patent fistula.      Results for orders placed during the hospital encounter of 11/01/24    Duplex Hemodialysis Access CAR    Interpretation Summary    Patent left arteriovenous fistula with normal volume flows.  Moderate arterial anastomotic and proximal inflow stenosis with chronic septum noted.  Otherwise widely patent fistula.      Results for orders placed during the hospital encounter of 12/05/24    Adult Transthoracic Echo Complete W/ Cont if Necessary Per Protocol    Interpretation Summary    The quality of the study is limited due to limited views obtained    Left ventricular systolic function is normal. Calculated left ventricular EF = 57.8%    The right ventricular cavity is mildly dilated.    The left atrial cavity is mildly dilated.    There is mild calcification of the aortic valve no evidence of  vegetation seen      Labs Pending at Discharge:  Pending Labs       Order Current Status    AFB Culture - Body Fluid, Pleural Cavity Preliminary result              Time spent on Discharge including face to face service:  more than 35  minutes    Electronically signed by Harhsal Higuera DO, 01/08/25, 10:00 AM EST.

## 2025-01-08 NOTE — PLAN OF CARE
Goal Outcome Evaluation:  Plan of Care Reviewed With: patient, family        Progress: improving  Outcome Evaluation: Patient  had two untis of blood in dialysis this shift, restraints requirered for a short while to safley administer blood product while dialysis was running per dialysis RN. Patient to discharge home tomorrow prior to 10am due to his daughter making arragments for someone to help get them settled in thier home, reported this to night shift nurse, messaged Dr Higuera who is attending. VSS, midodrine discontinued

## 2025-01-08 NOTE — PROGRESS NOTES
[facility]     Nephrology Progress Note      Patient Name: Nelson Wang  : 1945  MRN: [id number]  Primary Care Physician:  Domingo Bravo MD  Date of admission: 2024    Subjective   Subjective     Interval History:  Awake and alert, confused.  No distress, sitter and wife at bedside.  Going home today.      Objective   Objective     Vitals:   Temp:  [97.3 °F (36.3 °C)-97.6 °F (36.4 °C)] 97.3 °F (36.3 °C)  Heart Rate:  [] 90  Resp:  [16-18] 18  BP: (132-169)/(53-85) 148/63  Physical Exam:   constitutional: Awake, alert, confused, slow to respond, no distress.   Eyes: sclerae anicteric, no conjunctival injection   HENT: mucous membranes moist.     Neck: Supple, no thyromegaly, no lymphadenopathy, trachea midline, No JVD   Respiratory: Decreased to auscultation bilaterally, nonlabored respirations.   Cardiovascular: RRR, no murmurs, rubs, or gallops.   Gastrointestinal: Positive bowel sounds, soft,  nondistended   Musculoskeletal: No edema, no clubbing or cyanosis.  Left upper extremity AV fistula, patent.   Neurologic: moving extremities, answering questions but inconsistently.  Incoherent at times.  Sitting in wheelchair.   Skin: warm and dry, no rashes, areas of hypopigmentation.    Result Review    Result Reviewed:  I have personally reviewed the results from the time of this admission to 2025 14:57 EST and agree with these findings:  [x]  Laboratory  []  Microbiology  [x]  Radiology  []  EKG/Telemetry   []  Cardiology/Vascular   []  Pathology  [x]  Old records  []  Other:        Lab 25  0518 25  0518 25  1002 25  0844 25  0900 25  0559   SODIUM 137 135* 135* 138 132* 140   POTASSIUM 4.7 4.3 4.5 4.4 4.8 5.0   CHLORIDE 100 100 99 98 97* 102   CO2 27.1 21.7* 26.7 28.7 22.3 29.4*   BUN 47* 32* 20 43* 25* 38*   CREATININE 5.06* 3.88* 2.95* 4.93* 3.34* 5.94*   GLUCOSE 92 86 134* 132* 140* 118*   EGFR 11.0* 15.1* 21.0* 11.4* 18.1* 9.1*   ANION GAP 9.9  13.3 9.3 11.3 12.7 8.6   PHOSPHORUS 4.5 3.6 3.5 5.3* 2.5  --            Assessment & Plan   Assessment / Plan       Active Hospital Problems:  Active Hospital Problems    Diagnosis     **Hypoxia     Gram-positive bacteremia     ESRD (end stage renal disease)     Essential hypertension     Type 2 diabetes mellitus without complication      Assessment and Plan:    - ESRD, was on home dialysis prior to admission, has left upper extremity AV fistula for access.  Electrolytes are stable.  Dialysis TTS for now. going home today to resume home hemo.  I will see him at that dialysis clinic and [place] [facility].      - Aspiration pneumonia, and possibly recurrent aspiration, per pulmonary.      - Type 2 diabetes with complications.     - Hypotension blood pressure is acceptable now, on ProAmatine and blood pressure stable.     - Anemia of chronic kidney disease, was on RONALD as outpatient.  Epogen 30,000 units subcu weekly, first dose 1/6/2025. Iron saturation 24% and ferritin 1500.       - Altered mentation.  Remains incoherent. Unlikely uremia given consistent dialysis to this point.      Discussed with family.  Please call with any questions.

## 2025-01-08 NOTE — SIGNIFICANT NOTE
01/08/25 0856   Physical Therapy Time and Intention   Session Not Performed   (Pt now has orders for discharge shortly.)

## 2025-01-08 NOTE — PLAN OF CARE
Goal Outcome Evaluation:  Plan of Care Reviewed With: patient        Patient remains alert to self only. No complaints of pain throughout the shift. Sitter remained at bedside. Discharge paperwork addressed with daughter at bedside. No questions or concerns at this time. Patient sent home with meds via meds to bed. Discharged home via personal vehicle. No new issues at this time.

## 2025-01-08 NOTE — PLAN OF CARE
Goal Outcome Evaluation:              Outcome Evaluation: overtook pt care around 0215 this shift. pt to d/c home before 10AM on 1/8 per daughter d/t arrangements necessary to safely settle the pt in their home. Dr. Higuera aware per dayshift RN. o2 sats remaining above 90% on RA. no new issues/needs at this time.

## 2025-01-09 ENCOUNTER — TRANSITIONAL CARE MANAGEMENT TELEPHONE ENCOUNTER (OUTPATIENT)
Dept: CALL CENTER | Facility: HOSPITAL | Age: 80
End: 2025-01-09
Payer: MEDICARE

## 2025-01-09 ENCOUNTER — TELEPHONE (OUTPATIENT)
Dept: INTERNAL MEDICINE | Facility: CLINIC | Age: 80
End: 2025-01-09
Payer: MEDICARE

## 2025-01-09 VITALS
BODY MASS INDEX: 22.28 KG/M2 | WEIGHT: 141.98 LBS | SYSTOLIC BLOOD PRESSURE: 148 MMHG | RESPIRATION RATE: 18 BRPM | DIASTOLIC BLOOD PRESSURE: 63 MMHG | HEIGHT: 67 IN | TEMPERATURE: 97.3 F | HEART RATE: 90 BPM | OXYGEN SATURATION: 91 %

## 2025-01-09 NOTE — OUTREACH NOTE
Call Center TCM Note      Flowsheet Row Responses   [facility] patient discharged from? [facility]   Does the patient have one of the following disease processes/diagnoses(primary or secondary)? Other   TCM attempt successful? Yes   Call start time 0820   Call end time 0821   Discharge diagnosis **Hypoxia   Meds reviewed with patient/caregiver? Yes   Is the patient having any side effects they believe may be caused by any medication additions or changes? No   Does the patient have all medications ordered at discharge? Yes   Is the patient taking all medications as directed (includes completed medication regime)? Yes   Comments PCP appt listed for 1/10/25 1045 am. This appt does not state HOSP DC FUappt but does meet TCm guidelines.   Does the patient have an appointment with their PCP within 7-14 days of discharge? Yes   What is the Home health agency?  [facility]   Has home health visited the patient within 72 hours of discharge? Yes   Psychosocial issues? No   Did the patient receive a copy of their discharge instructions? Yes   Nursing interventions Reviewed instructions with patient   What is the patient's perception of their health status since discharge? Improving   Is the patient/caregiver able to teach back signs and symptoms related to disease process for when to call PCP? Yes   Is the patient/caregiver able to teach back signs and symptoms related to disease process for when to call 911? Yes   Is the patient/caregiver able to teach back the hierarchy of who to call/visit for symptoms/problems? PCP, Specialist, Home health nurse, Urgent Care, ED, 911 Yes   TCM call completed? Yes   Wrap up additional comments Daughter reports Pt is improving. States he has all meds and HH coming today.   Call end time 0821            Zaira Lara RN    1/9/2025, 08:22 EST

## 2025-01-09 NOTE — TELEPHONE ENCOUNTER
Twila from home health called to state that she is going to be seeing patient once a week, she said they are also trying to bring in PT and OT starting Monday or Tuesday.

## 2025-01-10 ENCOUNTER — TELEMEDICINE (OUTPATIENT)
Dept: INTERNAL MEDICINE | Facility: CLINIC | Age: 80
End: 2025-01-10
Payer: MEDICARE

## 2025-01-10 ENCOUNTER — TELEPHONE (OUTPATIENT)
Dept: NEUROSURGERY | Facility: CLINIC | Age: 80
End: 2025-01-10
Payer: MEDICARE

## 2025-01-10 DIAGNOSIS — J96.01 ACUTE RESPIRATORY FAILURE WITH HYPOXIA: ICD-10-CM

## 2025-01-10 DIAGNOSIS — Z92.89 HISTORY OF RECENT BLOOD TRANSFUSION: ICD-10-CM

## 2025-01-10 DIAGNOSIS — I46.9 CARDIOPULMONARY ARREST: ICD-10-CM

## 2025-01-10 DIAGNOSIS — J90 PLEURAL EFFUSION: ICD-10-CM

## 2025-01-10 DIAGNOSIS — F05 DELIRIUM WITH DEMENTIA: ICD-10-CM

## 2025-01-10 DIAGNOSIS — Z99.2 ANEMIA DUE TO CHRONIC KIDNEY DISEASE, ON CHRONIC DIALYSIS: ICD-10-CM

## 2025-01-10 DIAGNOSIS — F03.90 DELIRIUM WITH DEMENTIA: ICD-10-CM

## 2025-01-10 DIAGNOSIS — Z00.00 MEDICARE ANNUAL WELLNESS VISIT, SUBSEQUENT: Primary | ICD-10-CM

## 2025-01-10 DIAGNOSIS — Z99.2 ESRD (END STAGE RENAL DISEASE) ON DIALYSIS: ICD-10-CM

## 2025-01-10 DIAGNOSIS — N18.6 ESRD (END STAGE RENAL DISEASE) ON DIALYSIS: ICD-10-CM

## 2025-01-10 DIAGNOSIS — D63.1 ANEMIA DUE TO CHRONIC KIDNEY DISEASE, ON CHRONIC DIALYSIS: ICD-10-CM

## 2025-01-10 DIAGNOSIS — N18.6 ANEMIA DUE TO CHRONIC KIDNEY DISEASE, ON CHRONIC DIALYSIS: ICD-10-CM

## 2025-01-10 LAB
MYCOBACTERIUM SPEC CULT: NORMAL
NIGHT BLUE STAIN TISS: NORMAL

## 2025-01-10 PROCEDURE — 1111F DSCHRG MED/CURRENT MED MERGE: CPT | Performed by: STUDENT IN AN ORGANIZED HEALTH CARE EDUCATION/TRAINING PROGRAM

## 2025-01-10 PROCEDURE — 1126F AMNT PAIN NOTED NONE PRSNT: CPT | Performed by: STUDENT IN AN ORGANIZED HEALTH CARE EDUCATION/TRAINING PROGRAM

## 2025-01-10 PROCEDURE — 99495 TRANSJ CARE MGMT MOD F2F 14D: CPT | Performed by: STUDENT IN AN ORGANIZED HEALTH CARE EDUCATION/TRAINING PROGRAM

## 2025-01-10 NOTE — PROGRESS NOTES
Transitional Care Follow Up Visit  Subjective     Nelson Wang is a 79 y.o. male who presents for a transitional care management visit.    Within 48 business hours after discharge our office contacted him via telephone to coordinate his care and needs.      I reviewed and discussed the details of that call along with the discharge summary, hospital problems, inpatient lab results, inpatient diagnostic studies, and consultation reports with Nelson.     Current outpatient and discharge medications have been reconciled for the patient.  Reviewed by: Domingo Bravo MD          2025     4:34 PM   Date of TCM Phone Call   [facility]   Date of Admission 2024   Date of Discharge 2025   Discharge Disposition [facility]     Risk for Readmission (LACE) Score: 19 (2025  6:00 AM)      History of Present Illness   Course During Hospital Stay:       The following portions of the patient's history were reviewed and updated as appropriate: allergies, current medications, past family history, past medical history, past social history, past surgical history, and problem list.    Expand All Collapse All                                                                                    [facility]     Patient Name: Nelson Wang  : 1945  MRN: [id number]     Date of Admission: 2024  Date of Discharge:  2025     Primary Care Physician: Domingo Bravo MD     Consults         Date and Time Order Name Status Description     2024  4:02 PM Inpatient Psychiatrist Consult         2024  8:48 AM Inpatient Psychiatrist Consult         2024  6:44 PM Inpatient Nephrology Consult Completed       2024  5:49 PM Inpatient Hospitalist Consult         2024  4:31 PM Inpatient Pulmonology Consult Completed       2024  4:31 PM Inpatient Nephrology  Consult         11/12/2024  2:09 PM Inpatient Neurology Consult General Completed       11/3/2024  9:37 AM Inpatient Vascular Surgery Consult Completed       11/1/2024  6:43 AM Inpatient Infectious Diseases Consult Completed       11/1/2024  2:06 AM Inpatient Psychiatrist Consult Completed       11/1/2024  2:06 AM Inpatient Neurosurgery Consult Completed                  Active and Resolved Hospital Problems:       Active Hospital Problems     Diagnosis POA    **Hypoxia [R09.02] Yes    Gram-positive bacteremia [R78.81] Unknown    ESRD (end stage renal disease) [N18.6] Yes    Essential hypertension [I10] Yes    Type 2 diabetes mellitus without complication [E11.9] Yes       Resolved Hospital Problems   No resolved problems to display.         Hospital Course          Hospital Course:    Nelson Wang is a 79 y.o. 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 Patient was started on supplemental oxygen to maintain his oxygen saturations. He was given diuresis.  His chest x-ray was consistent with bilateral pleural effusions. Pulmonology was consulted for the bilateral pleural effusions and and nephrology consulted for possible volume overload. Pulmonology performed a thoracentesis which showed bloody fluid. Patient suffered CODE BLUE. Likely bleeding into his airways causing hypoxic arrest. He received CPR for 6 minutes and achieved ROSC. He was transferred to the ICU, intubated and on blood pressure support. Bronchoscopy was showing blood clots in his airways. Patient was started on CRRT. Patient started on micafungin on 12/7/2024. Patient extubated on 12/8/2024. Patient on high flow nasal cannula 30 L 25%.  Patient was able to be weaned down on his oxygen eventually to room air.  Patient continued with dialysis per nephrology.  Patient required Seroquel at  night for sleep which she is on at home however dose was slightly decreased.  Patient was continued on midodrine on his hemodialysis days.  Patient did complete a course of his IV vancomycin for his discitis.  Patient did complete a course of IV micafungin also.  Patient's Keppra was discontinued as there was concern it was contributing to his decreased mentation.  Patient's EEG from his prior hospitalization and November did not show any seizure activity.  Patient was discharged home on December 28.  Patient was supposed to go home after dialysis however later that evening patient had a fall and has had difficulty with transfers and ambulation.  No fractures on radiographs.  Patient developed fever tachycardia.  Chest CT demonstrated worsening right-sided infiltrate suspect aspiration.  Started on empiric antibiotics for aspiration pneumonia.  Diet downgraded by speech.  Hemoglobin trended down and patient did receive a blood transfusion with dialysis on January 7.  At this time patient has recovered from his fall and is able to ambulate and transfer better with health.  Family request patient be discharged home today.  Patient will be discharged home with family today in stable condition.  Long-term prognosis is extremely poor      Primary Historian: Daughter (Dr. Jose Wang)    This service was conducted via [employer]. Patient's location: home with daughter.  Physician's location is at clinic (Internal Medicine/Pediatrics clinic at [address] [place] [address] [address]).    Duration of appointment: 17:30  Consent in place: Verbal consent    Interim Course:    Mentation still quite poor at home.  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  Per daughter (Dr. Jose Wang), he is mostly oriented to person and place since discharge.  He is mostly able to indicate when he is hungry or thirsty.  He is sleeping well on seroquel.  He is off his keppra.    He will  "see nephrology this coming Tuesday (Dr. Castro).  Per his daughter, he does receive EPO.    Diet now varies between purees and mechanical depending on his level of mentation (given his aspiration risk).  His daughter is trying to make sure he gets at least 60 grams of protein daily.    He has complained of pain in his right leg since the fall that occurred during his admission.  Imaging has shown no fracture, and his daughter believes it is likely a pulled muscle.  She is using tylenol 1,000 mg TID to treat.    This next week, he will start home health and PT.  He has been quite weak.  They will be installing a wheelchair ramp soon at their home.  He does have a sitter.  His daughter administers home dialysis.  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    I did discuss palliative with his daughter  Jose Moncadael.  She reports that she had considered it, but as he has no needs for pain or anxiety medicines she does not think that it would be helpful at this time.  She does concede that between caring for her father and her mother that \"It's a lot\".  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  His daughter concedes that she is uncertain of how much improvement he will make in terms of his strength and mentation, and what his \"new normal\" will be.    Objective   There were no vitals taken for this visit.  Physical Exam  General: NAD  HEENT: NC, No scleral icterus  Resp: No increased work of breathing  Neuro: Not oriented to person, place or time  Derm: Vitiligo noted    Assessment & Plan   Problems Addressed this Visit       Anemia due to chronic kidney disease    Relevant Orders    CBC & Differential    Medicare annual wellness visit, subsequent - Primary    ESRD (end stage renal disease) on dialysis     Other Visit Diagnoses       Acute respiratory failure with hypoxia        Cardiopulmonary arrest        Pleural effusion        Delirium with dementia        History of recent blood " transfusion        Relevant Orders    CBC & Differential          Diagnoses         Codes Comments    Medicare annual wellness visit, subsequent    -  Primary ICD-10-CM: Z00.00  ICD-9-CM: V70.0     ESRD (end stage renal disease) on dialysis     ICD-10-CM: N18.6, Z99.2  ICD-9-CM: 585.6, V45.11     Acute respiratory failure with hypoxia     ICD-10-CM: J96.01  ICD-9-CM: 518.81     Cardiopulmonary arrest     ICD-10-CM: I46.9  ICD-9-CM: 427.5     Pleural effusion     ICD-10-CM: J90  ICD-9-CM: 511.9     Delirium with dementia     ICD-10-CM: F03.90, F05  ICD-9-CM: [id number]     Anemia due to chronic kidney disease, on chronic dialysis     ICD-10-CM: N18.6, D63.1, Z99.2  ICD-9-CM: 585.6, 285.21, V45.11     History of recent blood transfusion     ICD-10-CM: Z92.89  ICD-9-CM: V15.89               Hypoxia:  -resolved    History of CPR:  -noted    Delirium superimposed on Dementia:  -sleeping better on seroquel  -holding keppra for now    ESRD:  -follows closely with nephro, and on home dialysis    Anemia:  -receives EPO  -recent blood transfusion.  Will re-check next week    Return in about 6 weeks (around 2/21/2025) for Medicare Wellness.

## 2025-01-11 ENCOUNTER — READMISSION MANAGEMENT (OUTPATIENT)
Dept: CALL CENTER | Facility: HOSPITAL | Age: 80
End: 2025-01-11
Payer: MEDICARE

## 2025-01-11 ENCOUNTER — APPOINTMENT (OUTPATIENT)
Dept: CT IMAGING | Facility: HOSPITAL | Age: 80
End: 2025-01-11
Payer: MEDICARE

## 2025-01-11 ENCOUNTER — HOSPITAL ENCOUNTER (INPATIENT)
Facility: HOSPITAL | Age: 80
LOS: 14 days | Discharge: HOME HEALTH | End: 2025-01-25
Attending: STUDENT IN AN ORGANIZED HEALTH CARE EDUCATION/TRAINING PROGRAM | Admitting: INTERNAL MEDICINE
Payer: MEDICARE

## 2025-01-11 DIAGNOSIS — J18.9 RECURRENT PNEUMONIA: ICD-10-CM

## 2025-01-11 DIAGNOSIS — J96.11 CHRONIC RESPIRATORY FAILURE WITH HYPOXIA: Primary | ICD-10-CM

## 2025-01-11 DIAGNOSIS — J96.21 ACUTE ON CHRONIC RESPIRATORY FAILURE WITH HYPOXIA: ICD-10-CM

## 2025-01-11 DIAGNOSIS — J69.0 ASPIRATION PNEUMONIA DUE TO GASTRIC SECRETIONS, UNSPECIFIED LATERALITY, UNSPECIFIED PART OF LUNG: ICD-10-CM

## 2025-01-11 DIAGNOSIS — R05.3 CHRONIC COUGH: ICD-10-CM

## 2025-01-11 DIAGNOSIS — R13.12 OROPHARYNGEAL DYSPHAGIA: ICD-10-CM

## 2025-01-11 DIAGNOSIS — R09.02 HYPOXIA: ICD-10-CM

## 2025-01-11 DIAGNOSIS — N18.6 ESRD (END STAGE RENAL DISEASE): ICD-10-CM

## 2025-01-11 DIAGNOSIS — R26.2 DIFFICULTY IN WALKING: ICD-10-CM

## 2025-01-11 DIAGNOSIS — J69.0 ASPIRATION PNEUMONIA, UNSPECIFIED ASPIRATION PNEUMONIA TYPE, UNSPECIFIED LATERALITY, UNSPECIFIED PART OF LUNG: ICD-10-CM

## 2025-01-11 LAB
ANION GAP SERPL CALCULATED.3IONS-SCNC: 14.8 MMOL/L (ref 5–15)
BASOPHILS # BLD AUTO: 0.04 10*3/MM3 (ref 0–0.2)
BASOPHILS NFR BLD AUTO: 0.7 % (ref 0–1.5)
BUN SERPL-MCNC: 50 MG/DL (ref 8–23)
BUN/CREAT SERPL: 8.2 (ref 7–25)
CALCIUM SPEC-SCNC: 10.3 MG/DL (ref 8.6–10.5)
CHLORIDE SERPL-SCNC: 98 MMOL/L (ref 98–107)
CO2 SERPL-SCNC: 24.2 MMOL/L (ref 22–29)
CREAT SERPL-MCNC: 6.07 MG/DL (ref 0.76–1.27)
DEPRECATED RDW RBC AUTO: 58.6 FL (ref 37–54)
EGFRCR SERPLBLD CKD-EPI 2021: 8.8 ML/MIN/1.73
EOSINOPHIL # BLD AUTO: 0.42 10*3/MM3 (ref 0–0.4)
EOSINOPHIL NFR BLD AUTO: 7.7 % (ref 0.3–6.2)
ERYTHROCYTE [DISTWIDTH] IN BLOOD BY AUTOMATED COUNT: 19.1 % (ref 12.3–15.4)
GLUCOSE SERPL-MCNC: 92 MG/DL (ref 65–99)
HCT VFR BLD AUTO: 35.2 % (ref 37.5–51)
HGB BLD-MCNC: 10.7 G/DL (ref 13–17.7)
IMM GRANULOCYTES # BLD AUTO: 0.02 10*3/MM3 (ref 0–0.05)
IMM GRANULOCYTES NFR BLD AUTO: 0.4 % (ref 0–0.5)
LYMPHOCYTES # BLD AUTO: 1.85 10*3/MM3 (ref 0.7–3.1)
LYMPHOCYTES NFR BLD AUTO: 34.1 % (ref 19.6–45.3)
MAGNESIUM SERPL-MCNC: 2.1 MG/DL (ref 1.6–2.4)
MCH RBC QN AUTO: 27 PG (ref 26.6–33)
MCHC RBC AUTO-ENTMCNC: 30.4 G/DL (ref 31.5–35.7)
MCV RBC AUTO: 88.7 FL (ref 79–97)
MONOCYTES # BLD AUTO: 0.69 10*3/MM3 (ref 0.1–0.9)
MONOCYTES NFR BLD AUTO: 12.7 % (ref 5–12)
NEUTROPHILS NFR BLD AUTO: 2.41 10*3/MM3 (ref 1.7–7)
NEUTROPHILS NFR BLD AUTO: 44.4 % (ref 42.7–76)
NRBC BLD AUTO-RTO: 0 /100 WBC (ref 0–0.2)
PLATELET # BLD AUTO: 224 10*3/MM3 (ref 140–450)
PMV BLD AUTO: 9.5 FL (ref 6–12)
POTASSIUM SERPL-SCNC: 4.4 MMOL/L (ref 3.5–5.2)
RBC # BLD AUTO: 3.97 10*6/MM3 (ref 4.14–5.8)
SODIUM SERPL-SCNC: 137 MMOL/L (ref 136–145)
WBC NRBC COR # BLD AUTO: 5.43 10*3/MM3 (ref 3.4–10.8)

## 2025-01-11 PROCEDURE — 5A1D70Z PERFORMANCE OF URINARY FILTRATION, INTERMITTENT, LESS THAN 6 HOURS PER DAY: ICD-10-PCS | Performed by: INTERNAL MEDICINE

## 2025-01-11 PROCEDURE — 94799 UNLISTED PULMONARY SVC/PX: CPT

## 2025-01-11 PROCEDURE — P9047 ALBUMIN (HUMAN), 25%, 50ML: HCPCS | Performed by: INTERNAL MEDICINE

## 2025-01-11 PROCEDURE — 93010 ELECTROCARDIOGRAM REPORT: CPT | Performed by: INTERNAL MEDICINE

## 2025-01-11 PROCEDURE — 94640 AIRWAY INHALATION TREATMENT: CPT

## 2025-01-11 PROCEDURE — 80048 BASIC METABOLIC PNL TOTAL CA: CPT | Performed by: INTERNAL MEDICINE

## 2025-01-11 PROCEDURE — 25010000002 ALBUMIN HUMAN 25% PER 50 ML: Performed by: INTERNAL MEDICINE

## 2025-01-11 PROCEDURE — 99223 1ST HOSP IP/OBS HIGH 75: CPT | Performed by: INTERNAL MEDICINE

## 2025-01-11 PROCEDURE — 94664 DEMO&/EVAL PT USE INHALER: CPT

## 2025-01-11 PROCEDURE — 72192 CT PELVIS W/O DYE: CPT

## 2025-01-11 PROCEDURE — 93005 ELECTROCARDIOGRAM TRACING: CPT | Performed by: INTERNAL MEDICINE

## 2025-01-11 PROCEDURE — 63710000001 ONDANSETRON ODT 4 MG TABLET DISPERSIBLE: Performed by: INTERNAL MEDICINE

## 2025-01-11 PROCEDURE — 25010000002 HEPARIN (PORCINE) PER 1000 UNITS: Performed by: INTERNAL MEDICINE

## 2025-01-11 PROCEDURE — 83735 ASSAY OF MAGNESIUM: CPT | Performed by: INTERNAL MEDICINE

## 2025-01-11 PROCEDURE — 85025 COMPLETE CBC W/AUTO DIFF WBC: CPT | Performed by: INTERNAL MEDICINE

## 2025-01-11 RX ORDER — QUETIAPINE FUMARATE 25 MG/1
12.5 TABLET, FILM COATED ORAL ONCE AS NEEDED
Status: DISCONTINUED | OUTPATIENT
Start: 2025-01-11 | End: 2025-01-16

## 2025-01-11 RX ORDER — POLYETHYLENE GLYCOL 3350 17 G/17G
17 POWDER, FOR SOLUTION ORAL DAILY PRN
Status: DISCONTINUED | OUTPATIENT
Start: 2025-01-11 | End: 2025-01-16

## 2025-01-11 RX ORDER — AMOXICILLIN 250 MG
2 CAPSULE ORAL 2 TIMES DAILY PRN
Status: DISCONTINUED | OUTPATIENT
Start: 2025-01-11 | End: 2025-01-16

## 2025-01-11 RX ORDER — LEVOTHYROXINE SODIUM 175 UG/1
1 TABLET ORAL DAILY
COMMUNITY
Start: 2025-01-09

## 2025-01-11 RX ORDER — FAMOTIDINE 20 MG/1
20 TABLET, FILM COATED ORAL DAILY
Status: DISCONTINUED | OUTPATIENT
Start: 2025-01-11 | End: 2025-01-13

## 2025-01-11 RX ORDER — BUDESONIDE 0.5 MG/2ML
0.5 INHALANT ORAL
Status: DISCONTINUED | OUTPATIENT
Start: 2025-01-11 | End: 2025-01-25 | Stop reason: HOSPADM

## 2025-01-11 RX ORDER — ARFORMOTEROL TARTRATE 15 UG/2ML
15 SOLUTION RESPIRATORY (INHALATION)
Status: DISCONTINUED | OUTPATIENT
Start: 2025-01-11 | End: 2025-01-25 | Stop reason: HOSPADM

## 2025-01-11 RX ORDER — ALBUMIN (HUMAN) 12.5 G/50ML
12.5 SOLUTION INTRAVENOUS AS NEEDED
Status: DISCONTINUED | OUTPATIENT
Start: 2025-01-11 | End: 2025-01-25 | Stop reason: HOSPADM

## 2025-01-11 RX ORDER — SODIUM CHLORIDE 9 MG/ML
40 INJECTION, SOLUTION INTRAVENOUS AS NEEDED
Status: DISCONTINUED | OUTPATIENT
Start: 2025-01-11 | End: 2025-01-25 | Stop reason: HOSPADM

## 2025-01-11 RX ORDER — HEPARIN SODIUM 1000 [USP'U]/ML
1000 INJECTION, SOLUTION INTRAVENOUS; SUBCUTANEOUS ONCE
Status: COMPLETED | OUTPATIENT
Start: 2025-01-11 | End: 2025-01-11

## 2025-01-11 RX ORDER — ONDANSETRON 4 MG/1
4 TABLET, ORALLY DISINTEGRATING ORAL EVERY 6 HOURS PRN
Status: DISCONTINUED | OUTPATIENT
Start: 2025-01-11 | End: 2025-01-13 | Stop reason: SDUPTHER

## 2025-01-11 RX ORDER — HEPARIN SODIUM 5000 [USP'U]/ML
5000 INJECTION, SOLUTION INTRAVENOUS; SUBCUTANEOUS EVERY 12 HOURS SCHEDULED
Status: DISCONTINUED | OUTPATIENT
Start: 2025-01-11 | End: 2025-01-17

## 2025-01-11 RX ORDER — SODIUM CHLORIDE 0.9 % (FLUSH) 0.9 %
10 SYRINGE (ML) INJECTION EVERY 12 HOURS SCHEDULED
Status: DISCONTINUED | OUTPATIENT
Start: 2025-01-11 | End: 2025-01-25 | Stop reason: HOSPADM

## 2025-01-11 RX ORDER — QUETIAPINE FUMARATE 25 MG/1
37.5 TABLET, FILM COATED ORAL NIGHTLY
Status: DISCONTINUED | OUTPATIENT
Start: 2025-01-11 | End: 2025-01-16

## 2025-01-11 RX ORDER — ATORVASTATIN CALCIUM 40 MG/1
40 TABLET, FILM COATED ORAL DAILY
Status: DISCONTINUED | OUTPATIENT
Start: 2025-01-12 | End: 2025-01-16

## 2025-01-11 RX ORDER — ACETAMINOPHEN 500 MG
1000 TABLET ORAL EVERY 8 HOURS
Status: DISCONTINUED | OUTPATIENT
Start: 2025-01-11 | End: 2025-01-18

## 2025-01-11 RX ORDER — SODIUM CHLORIDE 0.9 % (FLUSH) 0.9 %
10 SYRINGE (ML) INJECTION AS NEEDED
Status: DISCONTINUED | OUTPATIENT
Start: 2025-01-11 | End: 2025-01-25 | Stop reason: HOSPADM

## 2025-01-11 RX ORDER — BISACODYL 5 MG/1
5 TABLET, DELAYED RELEASE ORAL DAILY PRN
Status: DISCONTINUED | OUTPATIENT
Start: 2025-01-11 | End: 2025-01-16

## 2025-01-11 RX ORDER — IPRATROPIUM BROMIDE AND ALBUTEROL SULFATE 2.5; .5 MG/3ML; MG/3ML
3 SOLUTION RESPIRATORY (INHALATION)
Status: DISCONTINUED | OUTPATIENT
Start: 2025-01-11 | End: 2025-01-21

## 2025-01-11 RX ORDER — ONDANSETRON 4 MG/1
4 TABLET, ORALLY DISINTEGRATING ORAL EVERY 6 HOURS PRN
Status: DISCONTINUED | OUTPATIENT
Start: 2025-01-11 | End: 2025-01-21

## 2025-01-11 RX ORDER — CALCIUM CARBONATE 500 MG/1
1 TABLET, CHEWABLE ORAL 2 TIMES DAILY PRN
Status: DISCONTINUED | OUTPATIENT
Start: 2025-01-11 | End: 2025-01-16

## 2025-01-11 RX ORDER — BISACODYL 10 MG
10 SUPPOSITORY, RECTAL RECTAL DAILY PRN
Status: DISCONTINUED | OUTPATIENT
Start: 2025-01-11 | End: 2025-01-16

## 2025-01-11 RX ADMIN — ONDANSETRON 4 MG: 4 TABLET, ORALLY DISINTEGRATING ORAL at 21:59

## 2025-01-11 RX ADMIN — FAMOTIDINE 20 MG: 20 TABLET, FILM COATED ORAL at 14:10

## 2025-01-11 RX ADMIN — BUDESONIDE 0.5 MG: 0.5 INHALANT ORAL at 14:40

## 2025-01-11 RX ADMIN — IPRATROPIUM BROMIDE AND ALBUTEROL SULFATE 3 ML: 2.5; .5 SOLUTION RESPIRATORY (INHALATION) at 21:04

## 2025-01-11 RX ADMIN — IPRATROPIUM BROMIDE AND ALBUTEROL SULFATE 3 ML: 2.5; .5 SOLUTION RESPIRATORY (INHALATION) at 14:40

## 2025-01-11 RX ADMIN — ALBUMIN (HUMAN) 12.5 G: 0.25 INJECTION, SOLUTION INTRAVENOUS at 17:06

## 2025-01-11 RX ADMIN — BUDESONIDE 0.5 MG: 0.5 INHALANT ORAL at 21:04

## 2025-01-11 RX ADMIN — HEPARIN SODIUM 1000 UNITS: 1000 INJECTION INTRAVENOUS; SUBCUTANEOUS at 16:07

## 2025-01-11 RX ADMIN — CALCIUM CARBONATE 1 TABLET: 500 TABLET, CHEWABLE ORAL at 22:04

## 2025-01-11 RX ADMIN — ARFORMOTEROL TARTRATE 15 MCG: 15 SOLUTION RESPIRATORY (INHALATION) at 21:04

## 2025-01-11 RX ADMIN — ACETAMINOPHEN 1000 MG: 500 TABLET ORAL at 21:26

## 2025-01-11 RX ADMIN — QUETIAPINE FUMARATE 37.5 MG: 25 TABLET ORAL at 21:26

## 2025-01-11 RX ADMIN — ARFORMOTEROL TARTRATE 15 MCG: 15 SOLUTION RESPIRATORY (INHALATION) at 14:40

## 2025-01-11 NOTE — H&P
[facility]   HOSPITALIST HISTORY AND PHYSICAL  Date: 2025   Patient Name: Nelson Wang  : 1945  MRN: [id number]  Primary Care Physician:  Domingo Bravo MD  Date of admission: 2025    Subjective   Subjective     Chief Complaint:   Home dialysis malfunction    HPI:    Nelson Wang is a 79 y.o. male with ESRD on dialysis, type 2 diabetes, dementia, discitis having completed vancomycin, A-fib, restrictive lung disease.  Patient recently discharged following long hospitalization, admitted from 2024 through 2025.  Patient discharged home with in-home dialysis.  Unfortunately today patient's dialysis machine not working.  Patient's primary nephrologist was contacted, unable to get patient into other outpatient dialysis facilities.  Therefore, decision was made to direct admit patient into the hospital for dialysis as he has not had dialysis for 3 days.  Hospitalist service contacted for admission orders, nephrology consulted for dialysis needs.    Personal History     Past Medical History:  Past Medical History:   Diagnosis Date    Abnormal stress test     Anemia     IRON INFUSIONS WITH DIALYSIS    Anesthesia     WITH HIP REPLACEMENT DAUGHTER BELIEVES HAD SVT     Ankle pain, right     Anxiety and depression     Arthritis     CKD (chronic kidney disease), stage IV     DIALYSIS [id number] SHILEY IN RIGHT CHEST    Diabetes     GERD (gastroesophageal reflux disease)     Gout     High cholesterol     History of peritoneal dialysis     HL (hearing loss)     Hyperkalemia     Hypertension     Hypothyroidism     Insomnia     Night terrors     Psoriasis     Psoriasis     Sleep apnea     Sleep walking     Thrombocytopenia     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    Vitiligo        Past Surgical History:  Past Surgical History:   Procedure Laterality Date    ABDOMINAL SURGERY      ANKLE SURGERY Right 1990    APPENDECTOMY N/A     ARTERIOVENOUS FISTULA/SHUNT SURGERY Left  07/16/2024    Procedure: Creation of left arm arteriovenous fistula;  Surgeon: Moses Mir MD;  Location: [facility] MAIN OR;  Service: Vascular;  Laterality: Left;    BRONCHOSCOPY N/A 03/01/2024    Procedure: BRONCHOSCOPY WITH BAL AND WASHINGS;  Surgeon: Sandra Espinal MD;  Location: [facility] ENDOSCOPY;  Service: Pulmonary;  Laterality: N/A;  PNEUMONIA    BRONCHOSCOPY N/A 08/30/2024    Procedure: BRONCHOSCOPY WITH BALS AND WASHINGS;  Surgeon: Sandra Espinal MD;  Location: [facility] ENDOSCOPY;  Service: Pulmonary;  Laterality: N/A;  MUCOUS PLUGGING    CARDIAC CATHETERIZATION N/A 05/29/2024    Procedure: Left Heart Cath with possible coronary angioplasty;  Surgeon: Stephan Nichols MD;  Location: [facility] CATH INVASIVE LOCATION;  Service: Cardiology;  Laterality: N/A;    COLONOSCOPY N/A 06/2022    [facility]    ENDOSCOPY N/A 10/28/2024    Procedure: ESOPHAGOGASTRODUODENOSCOPY INSERTION OF LIGHTED INSTRUMENT TO VIEW ESOPHAGUS, STOMACH AND SMALL INTESTINE;  Surgeon: Kingsley Peraza MD;  Location: [facility] ENDOSCOPY;  Service: Gastroenterology;  Laterality: N/A;  Gastritis    FRACTURE SURGERY      HIP BIPOLAR REPLACEMENT Right 01/2000    INSERTION HEMODIALYSIS CATHETER Left 04/09/2024    Procedure: HEMODIALYSIS CATHETER INSERTION;  Surgeon: Jose Berry MD;  Location: [facility] MAIN OR;  Service: General;  Laterality: Left;    INSERTION HEMODIALYSIS CATHETER N/A 04/12/2024    Procedure: Tunneled hemodialysis catheter insertion;  Surgeon: Enrique Vinson MD;  Location: [facility] MAIN OR;  Service: Vascular;  Laterality: N/A;    INSERTION PERITONEAL DIALYSIS CATHETER N/A 03/27/2023    Procedure: LAPAROSCOPIC INSERTION PERITONEAL DIALYSIS CATHETER, LAPAROSCOPIC OMENTOPEXY WITH LYSIS OF ADHESIONS;  Surgeon: Jose Berry MD;  Location: [facility] MAIN OR;  Service: General;  Laterality: N/A;    INSERTION PERITONEAL DIALYSIS CATHETER Left 07/23/2023    Procedure: REVISION OF PERITONEAL DIALYSIS CATHETER;   Surgeon: Radha Oreilly MD;  Location: [facility] MAIN OR;  Service: General;  Laterality: Left;    JOINT REPLACEMENT      REMOVAL PERITONEAL DIALYSIS CATHETER N/A 2024    Procedure: REMOVAL PERITONEAL DIALYSIS CATHETER;  Surgeon: Jose Berry MD;  Location: [facility] MAIN OR;  Service: General;  Laterality: N/A;    RENAL BIOPSY Left 07/15/2022    UPPER GASTROINTESTINAL ENDOSCOPY      VASCULAR SURGERY      WRIST SURGERY      UNSURE WHICH SIDE DAUGHTER REPORTS HAD SEVERE  CUT FROM WINDOW AND THEY HAD TO DO RECONSTRUCTIVE SURGERY WITH VESSELS AND NERVES       Family History:   Family History   Problem Relation Age of Onset    Diabetes Mother     Heart disease Father     Colon cancer Sister 77    Cancer Sister 35    Diabetes Sister     Diabetes Brother     Sleep apnea Paternal Aunt     Heart disease Paternal Uncle     Sleep apnea Daughter     Malig Hyperthermia Neg Hx        Social History:   Social History     Socioeconomic History    Marital status:    Tobacco Use    Smoking status: Former     Current packs/day: 0.00     Average packs/day: 1 pack/day for 10.0 years (10.0 ttl pk-yrs)     Types: Cigarettes     Start date:      Quit date:      Years since quittin.0     Passive exposure: Past    Smokeless tobacco: Never    Tobacco comments:     quit 25 years ago, chews nicotine gum in past   Vaping Use    Vaping status: Former   Substance and Sexual Activity    Alcohol use: Not Currently     Comment: 1 DRINK/DAY-rarely    Drug use: Never    Sexual activity: Defer       Home Medications:  Methoxy PEG-Epoetin Beta, QUEtiapine, arformoterol, atorvastatin, budesonide, famotidine, ipratropium-albuterol, levothyroxine, midodrine, and sevelamer    Allergies:  No Known Allergies    Objective   Objective     Vitals:   Temp:  [97.4 °F (36.3 °C)-97.6 °F (36.4 °C)] 97.6 °F (36.4 °C)  Heart Rate:  [68-77] 77  Resp:  [16-18] 16  BP: (119-141)/(55-74) 141/55    Physical Exam   General: Chronically  ill-appearing and frail.     Cardiovascular: Irregularly irregular, rate controlled  Pulmonary: clear, no w/r/r   Gastrointestinal: Soft nontender nondistended positive bowel sounds all 4 quadrants no rebound or guarding  Musculoskeletal: Full rom   Neuro: Orientated to name, able to make good eye contact and follow commands.  Patient interacting appropriately.  Moving all 4 extremities.  Cranial nerves appear grossly intact    Result Review    Result Review:  I have personally reviewed the results from the time of this admission to 1/11/2025 16:36 EST and agree with these findings:  []  Laboratory  []  Microbiology  []  Radiology  []  EKG/Telemetry   []  Cardiology/Vascular   []  Pathology  []  Old records  []  Other:      Assessment & Plan   Assessment / Plan     Assessment/Plan:   End-stage renal disease on dialysis  Type 2 diabetes  Dementia  Discitis, completed course of vancomycin  Atrial fibrillation  Restrictive lung disease    Plan:  Patient direct admitted to the hospital for dialysis given issues with home dialysis  Nephrology consulted, appreciate assistance  Ordering labs now to get an assessment of patient's electrolytes  Home medications reviewed and resumed as indicated as discussed with patient's daughter at bedside  Patient with a fall recently.  Plain films negative for acute fractures, however patient continues to have hip pain, order CT scan to rule out fracture  CODE STATUS maintained is DNR/DNI as discussed with daughter at bedside  Intermittently patient requires Seroquel as needed during dialysis runs, will have available      VTE Prophylaxis:  Pharmacologic VTE prophylaxis orders are present.        CODE STATUS:    Medical Intervention Limits: No intubation (DNI)  Code Status (Patient has no pulse and is not breathing): No CPR (Do Not Attempt to Resuscitate)  Medical Interventions (Patient has pulse or is breathing): Limited Support      Electronically signed by Emeka Grant MD, 01/11/25,  2:22 PM EST.

## 2025-01-11 NOTE — PLAN OF CARE
Goal Outcome Evaluation:  Plan of Care Reviewed With: patient        Progress: no change  Outcome Evaluation: Patient had no complaints of pain or discomfort, vitals stable, had dialysis this shift.

## 2025-01-11 NOTE — PROGRESS NOTES
LOS: 0 days   Patient Care Team:  Domingo Bravo MD as PCP - General (Internal Medicine)  Josephine Warren APRN as Nurse Practitioner (Pulmonary Disease)  Virginie Lopez APRN as Nurse Practitioner (Pulmonary Disease)    Chief Complaint:  Renal issues    Subjective     Interval History:     Patient Complaints: Chart reviewed.  No new issues overnight  Admitted due to home dialysis machine issues.  Last HD Tuesday  D/W his daughter, Dr Wang.  Otherwise he is doing better    arformoterol, 15 mcg, Nebulization, BID - RT  [START ON 1/12/2025] atorvastatin, 40 mg, Oral, Daily  budesonide, 0.5 mg, Nebulization, BID - RT  famotidine, 20 mg, Oral, Daily  heparin (porcine), 5,000 Units, Subcutaneous, Q12H  ipratropium-albuterol, 3 mL, Nebulization, BID - RT  [START ON 1/12/2025] levothyroxine, 175 mcg, Oral, Q AM  sodium chloride, 10 mL, Intravenous, Q12H           Review of Systems:   General : No pain, no chills  HEENT: No headache, no nosebleed, no sore throat, no blurry vision  Chest : no chest pain, no palpitations  Respiratory: No cough, no SOA, no hemoptysis  GI:  No N/V/D, no abdominal pain  Skin : no rashes, no pruritus  Musculoskeletal : no flank pain, no joint effusions  Neuro : +weakness, no dizziness, no focal deficits  Endocrine: no heat/cold intolerance  Genitourinary: no dysuria or hematuria    Objective     Vital Signs  Temp:  [97.4 °F (36.3 °C)-97.6 °F (36.4 °C)] 97.6 °F (36.4 °C)  Heart Rate:  [68-77] 71  Resp:  [16-18] 16  BP: ()/(55-75) 99/75  No intake or output data in the 24 hours ending 01/11/25 1643        Physical Exam:     General Appearance:    Alert,  in no acute distress   Head:    Normocephalic, without obvious abnormality, atraumatic   Throat:    no thrush, oral mucosa moist   Neck:   No adenopathy, supple, no JVD   Musc:     No CVA tenderness to palpation   Lungs:     Clear to auscultation,respirations unlabored, no wheezes or rhonchi    Heart:    Regular rate and rhythm , normal  "S1 and S2, no            murmur, no gallop, no rub   Abdomen:     Normal bowel sounds, no masses, soft non-tender   Extremities:    No lower extremity edema, no cyanosis, Larm fistula   :     No hematuria    Skin:   Dry, No  bruising or rash                  Results Review:    Results from last 7 days   Lab Units 01/11/25  1418 01/07/25  0518 01/06/25  0518   SODIUM mmol/L 137 137 135*   POTASSIUM mmol/L 4.4 4.7 4.3   CHLORIDE mmol/L 98 100 100   CO2 mmol/L 24.2 27.1 21.7*   BUN mg/dL 50* 47* 32*   CREATININE mg/dL 6.07* 5.06* 3.88*   GLUCOSE mg/dL 92 92 86   CALCIUM mg/dL 10.3 10.3 9.8     Results from last 7 days   Lab Units 01/11/25  1418 01/07/25  0518 01/06/25  0518   WBC 10*3/mm3 5.43 4.11 4.22   HEMOGLOBIN g/dL 10.7* 7.2* 6.9*   HEMATOCRIT % 35.2* 24.9* 24.8*   PLATELETS 10*3/mm3 224 220 193     Magnesium   Date Value Ref Range Status   01/11/2025 2.1 1.6 - 2.4 mg/dL Final     No results found for: \"PHOS\"  No results found for: \"BNP\"  Lab Results   Component Value Date    ALBUMIN 2.9 (L) 01/07/2025      Brief Urine Lab Results  (Last result in the past 365 days)        Color   Clarity   Blood   Leuk Est   Nitrite   Protein   CREAT   Urine HCG        10/27/24 1417 Yellow   Cloudy   Negative   Trace   Negative   >=300 mg/dL (3+)                    No radiology results from the last 24 hrs         Assessment & Plan       ESRD (end stage renal disease) on dialysis    - ESRD, was on home dialysis prior to admission, has left upper extremity AV fistula for access.  Electrolytes are stable.  Dialysis today here due to machine issues at home.  I encouraged his daughter, Dr Wang, to do the needle sticks as if she were  at home with him      - Type 2 diabetes with complications.     - Hypotension blood pressure is acceptable now, on ProAmatine      - Anemia of chronic kidney disease, at goal     - Altered mentation. Better     Discussed with daughter      Plan:    Dialysis today then home?  Defer to Dr" Felipe      Please call if any questions  [phone]

## 2025-01-11 NOTE — SIGNIFICANT NOTE
Patient is a 79-year-old male who was recently discharged from our facility on 1/8/2025.  He has a significant past medical history of ESRD on dialysis on home hemodialysis, type 2 diabetes mellitus, and discitis that was treated with vancomycin.  During patient's most recent admission he had a in-hospital cardiac arrest after which his mentation was slow to improve.  He was discharged home with home health into the care of his family who has been using sitters for assistance.    After patient was discharged, there has been malfunction with his home hemodialysis unit.  Patient's family reached out to local outpatient dialysis facilities to attempt to secure a time for him to undergo outpatient hemodialysis, unsuccessful.  Family called patient's nephrologist who recommended direct admission for dialysis as the patient has not had dialysis for 3 days.    Because of the above, the hospitalist team accepted the patient for admission due to missed dialysis sessions.    Electronically signed by EDSON Antony, 01/11/25, 5:14 AM EST.

## 2025-01-11 NOTE — NURSING NOTE
This RN called and spoke with pt primary RN, Flor.    Requested a safety sitter for patient due to altered mentation and inability to continuously follow commands.    Patient will be in dialysis unit with single RN during treatment. Pt has a history of attempting to pull out lines and pull on dialysis tubing and needs frequent redirection and constant observation in order to ensure safety.

## 2025-01-12 ENCOUNTER — APPOINTMENT (OUTPATIENT)
Dept: GENERAL RADIOLOGY | Facility: HOSPITAL | Age: 80
End: 2025-01-12
Payer: MEDICARE

## 2025-01-12 ENCOUNTER — APPOINTMENT (OUTPATIENT)
Dept: CT IMAGING | Facility: HOSPITAL | Age: 80
End: 2025-01-12
Payer: MEDICARE

## 2025-01-12 LAB
ALBUMIN SERPL-MCNC: 3 G/DL (ref 3.5–5.2)
ALBUMIN/GLOB SERPL: 0.8 G/DL
ALP SERPL-CCNC: 115 U/L (ref 39–117)
ALT SERPL W P-5'-P-CCNC: 11 U/L (ref 1–41)
ANION GAP SERPL CALCULATED.3IONS-SCNC: 8.1 MMOL/L (ref 5–15)
ANION GAP SERPL CALCULATED.3IONS-SCNC: 9.1 MMOL/L (ref 5–15)
AST SERPL-CCNC: 30 U/L (ref 1–40)
BILIRUB SERPL-MCNC: 0.6 MG/DL (ref 0–1.2)
BUN SERPL-MCNC: 21 MG/DL (ref 8–23)
BUN SERPL-MCNC: 21 MG/DL (ref 8–23)
BUN/CREAT SERPL: 5.5 (ref 7–25)
BUN/CREAT SERPL: 5.7 (ref 7–25)
CALCIUM SPEC-SCNC: 10 MG/DL (ref 8.6–10.5)
CALCIUM SPEC-SCNC: 10.2 MG/DL (ref 8.6–10.5)
CHLORIDE SERPL-SCNC: 94 MMOL/L (ref 98–107)
CHLORIDE SERPL-SCNC: 94 MMOL/L (ref 98–107)
CO2 SERPL-SCNC: 26.9 MMOL/L (ref 22–29)
CO2 SERPL-SCNC: 27.9 MMOL/L (ref 22–29)
CREAT SERPL-MCNC: 3.66 MG/DL (ref 0.76–1.27)
CREAT SERPL-MCNC: 3.81 MG/DL (ref 0.76–1.27)
D-LACTATE SERPL-SCNC: 1.1 MMOL/L (ref 0.5–2)
D-LACTATE SERPL-SCNC: 1.7 MMOL/L (ref 0.5–2)
DEPRECATED RDW RBC AUTO: 61 FL (ref 37–54)
DEPRECATED RDW RBC AUTO: 61.7 FL (ref 37–54)
EGFRCR SERPLBLD CKD-EPI 2021: 15.5 ML/MIN/1.73
EGFRCR SERPLBLD CKD-EPI 2021: 16.2 ML/MIN/1.73
ERYTHROCYTE [DISTWIDTH] IN BLOOD BY AUTOMATED COUNT: 19.2 % (ref 12.3–15.4)
ERYTHROCYTE [DISTWIDTH] IN BLOOD BY AUTOMATED COUNT: 19.2 % (ref 12.3–15.4)
GLOBULIN UR ELPH-MCNC: 3.8 GM/DL
GLUCOSE BLDC GLUCOMTR-MCNC: 105 MG/DL (ref 70–99)
GLUCOSE BLDC GLUCOMTR-MCNC: 111 MG/DL (ref 70–99)
GLUCOSE BLDC GLUCOMTR-MCNC: 112 MG/DL (ref 70–99)
GLUCOSE BLDC GLUCOMTR-MCNC: 76 MG/DL (ref 70–99)
GLUCOSE SERPL-MCNC: 112 MG/DL (ref 65–99)
GLUCOSE SERPL-MCNC: 113 MG/DL (ref 65–99)
HCT VFR BLD AUTO: 35.5 % (ref 37.5–51)
HCT VFR BLD AUTO: 37.4 % (ref 37.5–51)
HGB BLD-MCNC: 10.4 G/DL (ref 13–17.7)
HGB BLD-MCNC: 10.9 G/DL (ref 13–17.7)
HOLD SPECIMEN: NORMAL
INR PPP: 1.03 (ref 0.86–1.15)
MAGNESIUM SERPL-MCNC: 2.1 MG/DL (ref 1.6–2.4)
MAGNESIUM SERPL-MCNC: 2.1 MG/DL (ref 1.6–2.4)
MCH RBC QN AUTO: 26.8 PG (ref 26.6–33)
MCH RBC QN AUTO: 27 PG (ref 26.6–33)
MCHC RBC AUTO-ENTMCNC: 29.1 G/DL (ref 31.5–35.7)
MCHC RBC AUTO-ENTMCNC: 29.3 G/DL (ref 31.5–35.7)
MCV RBC AUTO: 92.1 FL (ref 79–97)
MCV RBC AUTO: 92.2 FL (ref 79–97)
PHOSPHATE SERPL-MCNC: 4.4 MG/DL (ref 2.5–4.5)
PLATELET # BLD AUTO: 209 10*3/MM3 (ref 140–450)
PLATELET # BLD AUTO: 237 10*3/MM3 (ref 140–450)
PMV BLD AUTO: 10 FL (ref 6–12)
PMV BLD AUTO: 9.8 FL (ref 6–12)
POTASSIUM SERPL-SCNC: 4.6 MMOL/L (ref 3.5–5.2)
POTASSIUM SERPL-SCNC: 4.6 MMOL/L (ref 3.5–5.2)
PROCALCITONIN SERPL-MCNC: 0.47 NG/ML (ref 0–0.25)
PROT SERPL-MCNC: 6.8 G/DL (ref 6–8.5)
PROTHROMBIN TIME: 13.7 SECONDS (ref 11.8–14.9)
RBC # BLD AUTO: 3.85 10*6/MM3 (ref 4.14–5.8)
RBC # BLD AUTO: 4.06 10*6/MM3 (ref 4.14–5.8)
SODIUM SERPL-SCNC: 130 MMOL/L (ref 136–145)
SODIUM SERPL-SCNC: 130 MMOL/L (ref 136–145)
WBC NRBC COR # BLD AUTO: 7.11 10*3/MM3 (ref 3.4–10.8)
WBC NRBC COR # BLD AUTO: 7.79 10*3/MM3 (ref 3.4–10.8)

## 2025-01-12 PROCEDURE — 25810000003 LACTATED RINGERS SOLUTION: Performed by: INTERNAL MEDICINE

## 2025-01-12 PROCEDURE — 84100 ASSAY OF PHOSPHORUS: CPT | Performed by: INTERNAL MEDICINE

## 2025-01-12 PROCEDURE — 83605 ASSAY OF LACTIC ACID: CPT | Performed by: INTERNAL MEDICINE

## 2025-01-12 PROCEDURE — 70450 CT HEAD/BRAIN W/O DYE: CPT

## 2025-01-12 PROCEDURE — 87040 BLOOD CULTURE FOR BACTERIA: CPT | Performed by: INTERNAL MEDICINE

## 2025-01-12 PROCEDURE — 25010000002 VANCOMYCIN 5 G RECONSTITUTED SOLUTION: Performed by: INTERNAL MEDICINE

## 2025-01-12 PROCEDURE — 94799 UNLISTED PULMONARY SVC/PX: CPT

## 2025-01-12 PROCEDURE — 71045 X-RAY EXAM CHEST 1 VIEW: CPT

## 2025-01-12 PROCEDURE — 80053 COMPREHEN METABOLIC PANEL: CPT | Performed by: INTERNAL MEDICINE

## 2025-01-12 PROCEDURE — 25010000002 ACETAMINOPHEN 10 MG/ML SOLUTION: Performed by: INTERNAL MEDICINE

## 2025-01-12 PROCEDURE — 99221 1ST HOSP IP/OBS SF/LOW 40: CPT | Performed by: ORTHOPAEDIC SURGERY

## 2025-01-12 PROCEDURE — 99222 1ST HOSP IP/OBS MODERATE 55: CPT

## 2025-01-12 PROCEDURE — 85027 COMPLETE CBC AUTOMATED: CPT | Performed by: INTERNAL MEDICINE

## 2025-01-12 PROCEDURE — 25810000003 SODIUM CHLORIDE 0.9 % SOLUTION: Performed by: INTERNAL MEDICINE

## 2025-01-12 PROCEDURE — 85610 PROTHROMBIN TIME: CPT | Performed by: INTERNAL MEDICINE

## 2025-01-12 PROCEDURE — 25010000002 HEPARIN (PORCINE) PER 1000 UNITS: Performed by: INTERNAL MEDICINE

## 2025-01-12 PROCEDURE — 84145 PROCALCITONIN (PCT): CPT | Performed by: INTERNAL MEDICINE

## 2025-01-12 PROCEDURE — 99233 SBSQ HOSP IP/OBS HIGH 50: CPT | Performed by: INTERNAL MEDICINE

## 2025-01-12 PROCEDURE — 83735 ASSAY OF MAGNESIUM: CPT | Performed by: INTERNAL MEDICINE

## 2025-01-12 PROCEDURE — 82948 REAGENT STRIP/BLOOD GLUCOSE: CPT

## 2025-01-12 RX ORDER — SODIUM CHLORIDE 9 MG/ML
40 INJECTION, SOLUTION INTRAVENOUS AS NEEDED
Status: DISCONTINUED | OUTPATIENT
Start: 2025-01-12 | End: 2025-01-25 | Stop reason: HOSPADM

## 2025-01-12 RX ORDER — NOREPINEPHRINE BITARTRATE 0.03 MG/ML
.02-.3 INJECTION, SOLUTION INTRAVENOUS
Status: DISCONTINUED | OUTPATIENT
Start: 2025-01-12 | End: 2025-01-14

## 2025-01-12 RX ORDER — SODIUM CHLORIDE 0.9 % (FLUSH) 0.9 %
10 SYRINGE (ML) INJECTION EVERY 12 HOURS SCHEDULED
Status: DISCONTINUED | OUTPATIENT
Start: 2025-01-12 | End: 2025-01-25 | Stop reason: HOSPADM

## 2025-01-12 RX ORDER — ASPIRIN 81 MG/1
81 TABLET, CHEWABLE ORAL DAILY
Status: DISCONTINUED | OUTPATIENT
Start: 2025-01-12 | End: 2025-01-12

## 2025-01-12 RX ORDER — ACETAMINOPHEN 10 MG/ML
1000 INJECTION, SOLUTION INTRAVENOUS ONCE
Status: COMPLETED | OUTPATIENT
Start: 2025-01-12 | End: 2025-01-12

## 2025-01-12 RX ORDER — ASPIRIN 300 MG/1
300 SUPPOSITORY RECTAL DAILY
Status: DISCONTINUED | OUTPATIENT
Start: 2025-01-12 | End: 2025-01-16

## 2025-01-12 RX ORDER — OXYCODONE HYDROCHLORIDE 5 MG/1
2.5 TABLET ORAL ONCE AS NEEDED
Status: DISCONTINUED | OUTPATIENT
Start: 2025-01-12 | End: 2025-01-12

## 2025-01-12 RX ORDER — SODIUM CHLORIDE 0.9 % (FLUSH) 0.9 %
10 SYRINGE (ML) INJECTION AS NEEDED
Status: DISCONTINUED | OUTPATIENT
Start: 2025-01-12 | End: 2025-01-25 | Stop reason: HOSPADM

## 2025-01-12 RX ADMIN — Medication 10 ML: at 11:28

## 2025-01-12 RX ADMIN — IPRATROPIUM BROMIDE AND ALBUTEROL SULFATE 3 ML: 2.5; .5 SOLUTION RESPIRATORY (INHALATION) at 21:49

## 2025-01-12 RX ADMIN — BUDESONIDE 0.5 MG: 0.5 INHALANT ORAL at 21:50

## 2025-01-12 RX ADMIN — VANCOMYCIN HYDROCHLORIDE 1250 MG: 5 INJECTION, POWDER, LYOPHILIZED, FOR SOLUTION INTRAVENOUS at 22:23

## 2025-01-12 RX ADMIN — SODIUM CHLORIDE, POTASSIUM CHLORIDE, SODIUM LACTATE AND CALCIUM CHLORIDE 500 ML: 600; 310; 30; 20 INJECTION, SOLUTION INTRAVENOUS at 11:44

## 2025-01-12 RX ADMIN — ARFORMOTEROL TARTRATE 15 MCG: 15 SOLUTION RESPIRATORY (INHALATION) at 21:44

## 2025-01-12 RX ADMIN — ASPIRIN 300 MG: 300 SUPPOSITORY RECTAL at 18:06

## 2025-01-12 RX ADMIN — HEPARIN SODIUM 5000 UNITS: 5000 INJECTION INTRAVENOUS; SUBCUTANEOUS at 21:49

## 2025-01-12 RX ADMIN — ACETAMINOPHEN 1000 MG: 10 INJECTION, SOLUTION INTRAVENOUS at 11:23

## 2025-01-12 NOTE — PLAN OF CARE
Goal Outcome Evaluation:         Did well last night, reasonably oriented and communicative, CT last night of right hip unfortunately revealed a leg break , porbable ortho consult today  Received 2 u prbs  yesterday, h/h 10.5/35

## 2025-01-12 NOTE — PROGRESS NOTES
[facility]   Hospitalist Progress Note  Date: 2025  Patient Name: Nelson Wang  : 1945  MRN: [id number]  Date of admission: 2025  Room/Bed: 211/1      Subjective   Subjective     Chief Complaint:   Home hemodialysis malfunction    Summary:  Nelson Wang is a 79 y.o. male with ESRD on dialysis, type 2 diabetes, dementia, discitis having completed vancomycin, A-fib, restrictive lung disease.  Patient recently discharged following long hospitalization, admitted from 2024 through 2025.  Patient discharged home with in-home dialysis.  Unfortunately today patient's dialysis machine not working.  Patient's primary nephrologist was contacted, unable to get patient into other outpatient dialysis facilities.  Therefore, decision was made to direct admit patient into the hospital for dialysis as he has not had dialysis for 3 days.  Hospitalist service contacted for admission orders, nephrology consulted for dialysis needs.     Interval Followup:   RRT called this morning.  Came to bedside where patient was found to have fixed gaze to the right.  Patient only responding to painful stimuli minimally.  Stat CT obtained showing stable examination without acute intracranial process.  Teleneurology consulted during stroke RRT.  Felt altered mental status and confusion most likely secondary to toxic or metabolic etiology.  During stroke RRT patient noted to be hypotensive, while fluid bolus running, daughter endorses improvement in physical exam.    Patient's CT scan of pelvis does reveal right periprosthetic proximal femur fracture.  Orthopedic surgery consulted and able to talk with patient and daughter at bedside.  At this time plan is for conservative measures, continue nonoperative treatment with toe-touch as able, continue to work with PT and OT and follow-up in clinic closely.    Objective   Objective     Vitals:   Temp:  [97.2 °F (36.2 °C)-97.9 °F (36.6 °C)] 97.2 °F (36.2 °C)  Heart Rate:   [66-79] 68  Resp:  [16-20] 16  BP: ()/() 99/45  Flow (L/min) (Oxygen Therapy):  [1.5-3] 1.5    Physical Exam   General: Chronically ill-appearing and frail.     Cardiovascular: Irregularly irregular, rate controlled  Pulmonary: clear, no w/r/r   Gastrointestinal: Soft nontender nondistended positive bowel sounds all 4 quadrants no rebound or guarding  Neuro: Not responding to verbal stimuli.  Does withdrawal to painful stimuli.  Fixed gaze to the right.       Result Review    Result Review:  I have personally reviewed these results:  [x]  Laboratory      Lab 01/12/25  1030 01/12/25  0734 01/11/25  1418   WBC 7.11 7.79 5.43   HEMOGLOBIN 10.9* 10.4* 10.7*   HEMATOCRIT 37.4* 35.5* 35.2*   PLATELETS 237 209 224   NEUTROS ABS  --   --  2.41   IMMATURE GRANS (ABS)  --   --  0.02   LYMPHS ABS  --   --  1.85   MONOS ABS  --   --  0.69   EOS ABS  --   --  0.42*   MCV 92.1 92.2 88.7   LACTATE 1.7  --   --    PROTIME 13.7  --   --          Lab 01/12/25  1030 01/12/25  0734 01/11/25  1418 01/07/25  0518 01/06/25  0518   SODIUM  --  130*  130* 137 137 135*   POTASSIUM  --  4.6  4.6 4.4 4.7 4.3   CHLORIDE  --  94*  94* 98 100 100   CO2  --  26.9  27.9 24.2 27.1 21.7*   ANION GAP  --  9.1  8.1 14.8 9.9 13.3   BUN  --  21  21 50* 47* 32*   CREATININE  --  3.81*  3.66* 6.07* 5.06* 3.88*   EGFR  --  15.5*  16.2* 8.8* 11.0* 15.1*   GLUCOSE  --  112*  113* 92 92 86   CALCIUM  --  10.2  10.0 10.3 10.3 9.8   MAGNESIUM 2.1 2.1 2.1  --   --    PHOSPHORUS  --  4.4  --  4.5 3.6         Lab 01/12/25  0734 01/07/25  0518 01/06/25  0518   TOTAL PROTEIN 6.8  --   --    ALBUMIN 3.0* 2.9* 2.6*   GLOBULIN 3.8  --   --    ALT (SGPT) 11  --   --    AST (SGOT) 30  --   --    BILIRUBIN 0.6  --   --    ALK PHOS 115  --   --          Lab 01/12/25  1030   PROTIME 13.7   INR 1.03             Lab 01/07/25  1136 01/06/25  0601 01/06/25  0601 01/06/25  0518   IRON  --   --   --  47*   IRON SATURATION (TSAT)  --   --   --  24   TIBC  --    --   --  197*   TRANSFERRIN  --   --   --  132*   FERRITIN  --   --   --  1,517.00*   ABO TYPING AB   < > AB  --    RH TYPING Positive   < > Positive  --    ANTIBODY SCREEN Negative  --  Negative  --     < > = values in this interval not displayed.         Brief Urine Lab Results  (Last result in the past 365 days)        Color   Clarity   Blood   Leuk Est   Nitrite   Protein   CREAT   Urine HCG        10/27/24 1417 Yellow   Cloudy   Negative   Trace   Negative   >=300 mg/dL (3+)                 [x]  Microbiology   Microbiology Results (last 10 days)       Procedure Component Value - Date/Time    Blood Culture - Blood, Arm, Right [[id number]]  (Normal) Collected: 01/02/25 2157    Lab Status: Final result Specimen: Blood from Arm, Right Updated: 01/07/25 2215     Blood Culture No growth at 5 days    Narrative:      Less than seven (7) mL's of blood was collected.  Insufficient quantity may yield false negative results.    Blood Culture - Blood, Arm, Right [[id number]]  (Normal) Collected: 01/02/25 2157    Lab Status: Final result Specimen: Blood from Arm, Right Updated: 01/07/25 2215     Blood Culture No growth at 5 days    Narrative:      Less than seven (7) mL's of blood was collected.  Insufficient quantity may yield false negative results.    Respiratory Panel PCR w/COVID-19(SARS-CoV-2) RA/TANIA/GAVIN/[facility] In-House, NP Swab in UTM/VTM, 2 HR TAT - Swab, Nasopharynx [[id number]]  (Normal) Collected: 01/02/25 2117    Lab Status: Final result Specimen: Swab from Nasopharynx Updated: 01/02/25 2225     ADENOVIRUS, PCR Not Detected     Coronavirus 229E Not Detected     Coronavirus HKU1 Not Detected     Coronavirus NL63 Not Detected     Coronavirus OC43 Not Detected     COVID19 Not Detected     Human Metapneumovirus Not Detected     Human Rhinovirus/Enterovirus Not Detected     Influenza A PCR Not Detected     Influenza B PCR Not Detected     Parainfluenza Virus 1 Not Detected     Parainfluenza Virus 2 Not Detected      Parainfluenza Virus 3 Not Detected     Parainfluenza Virus 4 Not Detected     RSV, PCR Not Detected     Bordetella pertussis pcr Not Detected     Bordetella parapertussis PCR Not Detected     Chlamydophila pneumoniae PCR Not Detected     Mycoplasma pneumo by PCR Not Detected    Narrative:      In the setting of a positive respiratory panel with a viral infection PLUS a negative procalcitonin without other underlying concern for bacterial infection, consider observing off antibiotics or discontinuation of antibiotics and continue supportive care. If the respiratory panel is positive for atypical bacterial infection (Bordetella pertussis, Chlamydophila pneumoniae, or Mycoplasma pneumoniae), consider antibiotic de-escalation to target atypical bacterial infection.          [x]  Radiology  CT Head Without Contrast Stroke Protocol    Result Date: 1/12/2025  Impression: Stable examination without acute intracranial process. Electronically Signed: Margaret Hammond MD  1/12/2025 10:57 AM EST  Workstation ID: [id number]    CT Pelvis Without Contrast    Result Date: 1/11/2025  There is a suspected acute-to-subacute nondisplaced periprosthetic fracture involving the proximal right femur, as discussed. No other acute fractures are seen. No dislocation.   Portions of this note were completed with a voice recognition program.  Electronically Signed By-Stan Hogan MD On:1/11/2025 9:15 PM     []  EKG/Telemetry   []  Cardiology/Vascular   []  Pathology  []  Old records  []  Other:    Assessment & Plan   Assessment / Plan     Assessment:  Stroke rule out  End-stage renal disease on dialysis  Type 2 diabetes  Acute to subacute nondisplaced periprosthetic fracture involving proximal right femur  Dementia  Discitis, completed course of vancomycin  Atrial fibrillation on anticoagulation due to falls  Restrictive lung disease     Plan:  Patient transferred to telemetry bed under stroke protocol  Teleneurology consulted today for stroke  RRT  Initial CT scan negative, neurology recommending stroke protocol order set  Patient's symptoms did appear in relation to his low blood pressure.  Patient did have 1.3 L removed dialysis yesterday.  Patient provided 500 cc bolus  Nephrology consulted, appreciate assistance  Further runs of dialysis per nephrology  Patient able to meet with orthopedic surgery, acute to subacute nondisplaced periprosthetic fracture involving proximal right femur  Nonoperative management at this time, weightbearing toe-touch as tolerated, continue PT and OT as able  Home medications reviewed and resumed as indicated as discussed with patient's daughter at bedside  CODE STATUS maintained is DNR/DNI as discussed with daughter at bedside  Intermittently patient requires Seroquel as needed during dialysis runs, will have available    Ordering hospital bed for patient.  The patient has a medical condition which required positioning of the body in ways not feasible in an ordinary bed. Patient requires frequent and immediate changes in body position. The patient also requires the HOB to be elevated more than 30 degrees due to their shortness of breath and recurrent pneumonia due to aspiration.       Discussed with RN.  Discussed with daughter at bedside    VTE Prophylaxis:  Pharmacologic & mechanical VTE prophylaxis orders are present.        CODE STATUS:   Medical Intervention Limits: No intubation (DNI)  Code Status (Patient has no pulse and is not breathing): No CPR (Do Not Attempt to Resuscitate)  Medical Interventions (Patient has pulse or is breathing): Limited Support      Electronically signed by Emeka Grant MD, 1/12/2025, 17:31 EST.

## 2025-01-12 NOTE — NURSING NOTE
At 1018 patients daughter found RN in [place]ace with concerns about patients behavior. RN assessed patient and notified MD of findings, stroke RRT called. Blood sugar of 112, BP of 90/44, responsive to painful stimuli, not following commands. Patient was then transferred to PCU and bedside report given to PCU RN.

## 2025-01-12 NOTE — NURSING NOTE
Duration of Treatment 3.5 Hours   Access Site AVF   Dialyzer Revaclear    mL/min   Dialysate Temperature (C) 36   BFR-As tolerated to a maximum of: 400 mL/min   Prime Dialyzer With NS to Priming Volume? Yes   Dialysate Solution Bath: K+ = 3 mEq, CA = 2.5mg   Bicarb Other   Bicarb Comments 31 meq   Na+ 139 mEq   Fluid Removal: 1.5L     Patient completed 3.5 hour treatment, tolerated well.  1.3L removed.  Received one dose of albumin for hypotension, otherwise VSS throughout. Post tx report given to primary nurse, HIREN Foster.

## 2025-01-12 NOTE — PLAN OF CARE
Goal Outcome Evaluation:           Progress: no change  Outcome Evaluation: Patient arrived to floor from 4NT. NIH 24. Patient will not follow commands or speak aside from occasional moans when being turned. Pupils remain unequal but reactive to light. Bedrails x4 restraint order d/c'd as no longer needed, sitter remains at bedside. VSS and blood sugar stable. O2 titrated down to 1.5 L NC, tolerating well. No other acute changes since arrival, plan of care ongoing.

## 2025-01-12 NOTE — DISCHARGE INSTR - OTHER ORDERS
[facility]   [address] [place] [address]   Phone: [phone]   Phone: [phone]   Fax: [phone]    Call to create an account and discuss options for transportation services.    Satisfactory

## 2025-01-12 NOTE — PROGRESS NOTES
LOS: 1 day   Patient Care Team:  Domingo Bravo MD as PCP - General (Internal Medicine)  Josephine Warren APRN as Nurse Practitioner (Pulmonary Disease)  Virginie Lopez APRN as Nurse Practitioner (Pulmonary Disease)    Chief Complaint:  Renal issues    Subjective     Interval History:     Patient Complaints: Chart reviewed.   No issues with HD yday  Today has had a change in MS and stroke team called  Sitter present  He is poorly responsive    [Held by provider] acetaminophen, 1,000 mg, Oral, Q8H  arformoterol, 15 mcg, Nebulization, BID - RT  atorvastatin, 40 mg, Oral, Daily  budesonide, 0.5 mg, Nebulization, BID - RT  famotidine, 20 mg, Oral, Daily  heparin (porcine), 5,000 Units, Subcutaneous, Q12H  ipratropium-albuterol, 3 mL, Nebulization, BID - RT  levothyroxine, 175 mcg, Oral, Q AM  QUEtiapine, 37.5 mg, Oral, Nightly  sodium chloride, 10 mL, Intravenous, Q12H  sodium chloride, 10 mL, Intravenous, Q12H           Review of Systems:   Unable  Objective     Vital Signs  Temp:  [97.3 °F (36.3 °C)-97.9 °F (36.6 °C)] 97.9 °F (36.6 °C)  Heart Rate:  [65-99] 78  Resp:  [16-20] 16  BP: ()/() 94/49    Intake/Output Summary (Last 24 hours) at 1/12/2025 1341  Last data filed at 1/11/2025 1953  Gross per 24 hour   Intake --   Output 1300 ml   Net -1300 ml           Physical Exam:     General Appearance:    Poorly responsive,  in no acute distress   Head:    Normocephalic, without obvious abnormality, atraumatic   Throat:    dry   Neck:   No adenopathy, supple, no JVD   Musc:     No CVA tenderness to palpation   Lungs:     Clear to auscultation,respirations unlabored, no wheezes or rhonchi    Heart:    Regular rate and rhythm , normal S1 and S2, no            murmur, no gallop, no rub   Abdomen:     Normal bowel sounds, no masses, soft non-tender   Extremities:    No lower extremity edema, no cyanosis, Larm fistula   :     No hematuria    Skin:   Dry, No  rash                  Results Review:    Results  "from last 7 days   Lab Units 01/12/25  0734 01/11/25  1418 01/07/25  0518   SODIUM mmol/L 130*  130* 137 137   POTASSIUM mmol/L 4.6  4.6 4.4 4.7   CHLORIDE mmol/L 94*  94* 98 100   CO2 mmol/L 26.9  27.9 24.2 27.1   BUN mg/dL 21  21 50* 47*   CREATININE mg/dL 3.81*  3.66* 6.07* 5.06*   GLUCOSE mg/dL 112*  113* 92 92   CALCIUM mg/dL 10.2  10.0 10.3 10.3     Results from last 7 days   Lab Units 01/12/25  1030 01/12/25  0734 01/11/25  1418   WBC 10*3/mm3 7.11 7.79 5.43   HEMOGLOBIN g/dL 10.9* 10.4* 10.7*   HEMATOCRIT % 37.4* 35.5* 35.2*   PLATELETS 10*3/mm3 237 209 224     Magnesium   Date Value Ref Range Status   01/12/2025 2.1 1.6 - 2.4 mg/dL Final   01/12/2025 2.1 1.6 - 2.4 mg/dL Final   01/11/2025 2.1 1.6 - 2.4 mg/dL Final     Phosphorus   Date Value Ref Range Status   01/12/2025 4.4 2.5 - 4.5 mg/dL Final     No results found for: \"BNP\"  Lab Results   Component Value Date    ALBUMIN 3.0 (L) 01/12/2025      Brief Urine Lab Results  (Last result in the past 365 days)        Color   Clarity   Blood   Leuk Est   Nitrite   Protein   CREAT   Urine HCG        10/27/24 1417 Yellow   Cloudy   Negative   Trace   Negative   >=300 mg/dL (3+)                    CT Head Without Contrast Stroke Protocol    Result Date: 1/12/2025  CT HEAD WO CONTRAST STROKE PROTOCOL Date of Exam: 1/12/2025 10:31 AM EST Indication: stroke r/o. Comparison: CT head 12/30/2024 Technique: Axial CT images were obtained of the head without contrast administration.  Reconstructed coronal and sagittal images were also obtained. Automated exposure control and iterative construction methods were used. Scan Time: 10:43 a.m. Results discussed with Nathaniel at 10:56 a.m.. Findings: There is no evidence of acute infarction. There there is no acute intracranial hemorrhage. There are no extra-axial collections. Ventricles and CSF spaces are symmetric. No mass effect nor hydrocephalus. There are periventricular white matter changes and cerebral atrophy which " appears age-related.  There is fluid opacification of the right mastoid air cells. Paranasal sinuses appear aerated.  Osseous structures and orbits appear intact. There is some soft tissue prominence along the right posterior calvarium which may represent a small hematoma.     Impression: Impression: Stable examination without acute intracranial process. Electronically Signed: Margaret Hammond MD  1/12/2025 10:57 AM EST  Workstation ID: [id number]    CT Pelvis Without Contrast    Result Date: 1/11/2025  CT PELVIS WO CONTRAST-  Date of exam: 1/11/2025, 8:40 P.M.  Indication: Recent fall, continued hip pain, possible fracture of right hip.  Comparison: 12/29/2024.  TECHNIQUE: Axial CT images were obtained of the pelvis without contrast administration. Reconstructed 2D coronal and sagittal images were also obtained. Automated exposure control and iterative construction methods were used. The study is motion limited.  FINDINGS: Again demonstrated is a right hip prosthesis in place. There is an age-indeterminate, but probably acute to subacute, nondisplaced extra-articular closed periprosthetic fracture involving the proximal right femur including the intertrochanteric region. No other acute fractures are appreciated. No dislocation. The other findings are as described in the prior exam report from 12/29/2024.       Impression: There is a suspected acute-to-subacute nondisplaced periprosthetic fracture involving the proximal right femur, as discussed. No other acute fractures are seen. No dislocation.   Portions of this note were completed with a voice recognition program.  Electronically Signed By-Stan Hogan MD On:1/11/2025 9:15 PM            Assessment & Plan       ESRD (end stage renal disease) on dialysis    - ESRD, was on home dialysis prior to admission, has left upper extremity AV fistula for access.  Electrolytes are stable.  Dialysis today here due to machine issues at home.  I encouraged his daughter,   Felipe, to do the needle sticks here yday. Will plan another HD tomorrow      - Type 2 diabetes with complications.     - Hypotension blood pressure is acceptable now, on ProAmatine      - Anemia of chronic kidney disease, at goal     - Altered mentation. Worse, neuro called    _R femur FX- ortho seeing           Plan:    Dialysis tomorrow      Please call if any questions  [phone]

## 2025-01-12 NOTE — CONSULTS
[facility]   Consult Note    Patient Name: Nelson Wang  : 1945  MRN: [id number]  Primary Care Physician:  Domingo Bravo MD  Referring Physician: Domingo Bravo MD  Date of admission: 2025    Subjective   Subjective     Reason for Consult/ Chief Complaint: Right hip pain    HPI:  Nelson Wang is a 79 y.o. male with right hip pain after mechanical fall on 1229.  The patient was initially evaluated with x-rays and CT scan which were negative.  He was taken home and represented with continued hip pain.  The patient is also on dialysis.  His daughter is a physician and is at the bedside.  The patient has been more confused this morning.  Previously he was independently ambulatory.  He was unable to ambulate because of the right hip pain.  He has history of a [facility] and conversion of this to a revision style total hip arthroplasty about a year later.  No periprosthetic complications were noted until his recent fall.    Review of Systems   All systems were reviewed and negative except for those mentioned in HPI    Personal History     Past Medical History:   Diagnosis Date    Abnormal stress test     Anemia     IRON INFUSIONS WITH DIALYSIS    Anesthesia     WITH HIP REPLACEMENT DAUGHTER BELIEVES HAD SVT     Ankle pain, right     Anxiety and depression     Arthritis     CKD (chronic kidney disease), stage IV     DIALYSIS [id number] SHILEY IN RIGHT CHEST    Diabetes     GERD (gastroesophageal reflux disease)     Gout     High cholesterol     History of peritoneal dialysis     HL (hearing loss)     Hyperkalemia     Hypertension     Hypothyroidism     Insomnia     Night terrors     Psoriasis     Psoriasis     Sleep apnea     Sleep walking     Thrombocytopenia     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    Vitiligo        Past Surgical History:   Procedure Laterality Date    ABDOMINAL SURGERY      ANKLE SURGERY Right 1990    APPENDECTOMY N/A      ARTERIOVENOUS FISTULA/SHUNT SURGERY Left 07/16/2024    Procedure: Creation of left arm arteriovenous fistula;  Surgeon: Moses Mir MD;  Location: [facility] MAIN OR;  Service: Vascular;  Laterality: Left;    BRONCHOSCOPY N/A 03/01/2024    Procedure: BRONCHOSCOPY WITH BAL AND WASHINGS;  Surgeon: Sandra Espinal MD;  Location: [facility] ENDOSCOPY;  Service: Pulmonary;  Laterality: N/A;  PNEUMONIA    BRONCHOSCOPY N/A 08/30/2024    Procedure: BRONCHOSCOPY WITH BALS AND WASHINGS;  Surgeon: Sandra Espinal MD;  Location: [facility] ENDOSCOPY;  Service: Pulmonary;  Laterality: N/A;  MUCOUS PLUGGING    CARDIAC CATHETERIZATION N/A 05/29/2024    Procedure: Left Heart Cath with possible coronary angioplasty;  Surgeon: Stephan Nichols MD;  Location: [facility] CATH INVASIVE LOCATION;  Service: Cardiology;  Laterality: N/A;    COLONOSCOPY N/A 06/2022    [facility]    ENDOSCOPY N/A 10/28/2024    Procedure: ESOPHAGOGASTRODUODENOSCOPY INSERTION OF LIGHTED INSTRUMENT TO VIEW ESOPHAGUS, STOMACH AND SMALL INTESTINE;  Surgeon: Kingsley Peraza MD;  Location: [facility] ENDOSCOPY;  Service: Gastroenterology;  Laterality: N/A;  Gastritis    FRACTURE SURGERY      HIP BIPOLAR REPLACEMENT Right 01/2000    INSERTION HEMODIALYSIS CATHETER Left 04/09/2024    Procedure: HEMODIALYSIS CATHETER INSERTION;  Surgeon: Jose Berry MD;  Location: [facility] MAIN OR;  Service: General;  Laterality: Left;    INSERTION HEMODIALYSIS CATHETER N/A 04/12/2024    Procedure: Tunneled hemodialysis catheter insertion;  Surgeon: Enrique Vinson MD;  Location: [facility] MAIN OR;  Service: Vascular;  Laterality: N/A;    INSERTION PERITONEAL DIALYSIS CATHETER N/A 03/27/2023    Procedure: LAPAROSCOPIC INSERTION PERITONEAL DIALYSIS CATHETER, LAPAROSCOPIC OMENTOPEXY WITH LYSIS OF ADHESIONS;  Surgeon: Jose Berry MD;  Location: [facility] MAIN OR;  Service: General;  Laterality: N/A;    INSERTION PERITONEAL DIALYSIS CATHETER Left 07/23/2023     Procedure: REVISION OF PERITONEAL DIALYSIS CATHETER;  Surgeon: Radha Oreilly MD;  Location: [facility] MAIN OR;  Service: General;  Laterality: Left;    JOINT REPLACEMENT      REMOVAL PERITONEAL DIALYSIS CATHETER N/A 04/09/2024    Procedure: REMOVAL PERITONEAL DIALYSIS CATHETER;  Surgeon: Jose Berry MD;  Location: [facility] MAIN OR;  Service: General;  Laterality: N/A;    RENAL BIOPSY Left 07/15/2022    UPPER GASTROINTESTINAL ENDOSCOPY      VASCULAR SURGERY      WRIST SURGERY      UNSURE WHICH SIDE DAUGHTER REPORTS HAD SEVERE  CUT FROM WINDOW AND THEY HAD TO DO RECONSTRUCTIVE SURGERY WITH VESSELS AND NERVES       Family History: family history includes Cancer (age of onset: 35) in his sister; Colon cancer (age of onset: 77) in his sister; Diabetes in his brother, mother, and sister; Heart disease in his father and paternal uncle; Sleep apnea in his daughter and paternal aunt. Otherwise pertinent FHx was reviewed and not pertinent to current issue.    Social History:  reports that he quit smoking about 25 years ago. His smoking use included cigarettes. He started smoking about 35 years ago. He has a 10 pack-year smoking history. He has been exposed to tobacco smoke. He has never used smokeless tobacco. He reports that he does not currently use alcohol. He reports that he does not use drugs.    Home Medications:  Methoxy PEG-Epoetin Beta, QUEtiapine, arformoterol, atorvastatin, budesonide, famotidine, ipratropium-albuterol, levothyroxine, midodrine, and sevelamer    Allergies:  No Known Allergies    Objective    Objective     Vitals:   Temp:  [97.3 °F (36.3 °C)-97.9 °F (36.6 °C)] 97.9 °F (36.6 °C)  Heart Rate:  [65-99] 78  Resp:  [16-20] 16  BP: ()/() 94/49  Flow (L/min) (Oxygen Therapy):  [3] 3    Physical Exam:   Constitutional: Awake, alert   Eyes: PERRLA, sclerae anicteric, no conjunctival injection   HENT: NCAT, mucous membranes moist   Neck: Supple, no thyromegaly, no lymphadenopathy,  trachea midline   Respiratory: Clear to auscultation bilaterally, nonlabored respirations    Cardiovascular: RRR, no murmurs, rubs, or gallops, palpable pedal pulses bilaterally   Gastrointestinal: Positive bowel sounds, soft, nontender, nondistended   Musculoskeletal: No bilateral ankle edema, no clubbing or cyanosis to extremities   Psychiatric: Appropriate affect, cooperative   Neurologic: Oriented x 3, strength symmetric in all extremities, Cranial Nerves grossly intact to confrontation, speech clear   Skin: No rashes     Result Review    Result Review:  I have personally reviewed the results from the time of this admission to 1/12/2025 13:27 EST and agree with these findings:  [x]  Laboratory  []  Microbiology  [x]  Radiology  []  EKG/Telemetry   []  Cardiology/Vascular   []  Pathology  []  Old records  []  Other:    Most notable findings include: X-rays and CT scan: Nondisplaced intertrochanteric periprosthetic proximal femur fracture without subsidence or displacement.    Assessment & Plan   Assessment / Plan     Brief Patient Summary:  Nelson Wang is a 79 y.o. male who right periprosthetic proximal femur fracture    Active Hospital Problems:  Active Hospital Problems    Diagnosis     **ESRD (end stage renal disease) on dialysis        Plan: I discussed treatment options with the patient and his daughter, who was at bedside.  We discussed operative versus nonoperative treatment for his nondisplaced right periprosthetic proximal femur fracture.  As the patient has a nondisplaced fracture which is approximately 2 weeks old and has not had any subsidence or displacement I think nonoperative treatment would likely be the best option.  In addition he does have some current medical comorbidities that would make surgical treatment potentially complicated.  He has a revision style hip prosthesis in place and the fracture line is well above the tip of the femoral stem.  Therefore I made the recommendation of  toe-touch weightbearing with observation and follow-up x-rays in a few weeks.  We also discussed possibility of revision hip arthroplasty.  At this time we will plan for nonoperative treatment and continue to monitor the patient's progress.  He may mobilize as able with PT/OT.  Plan for hospitalist for pain control/DVT prophylaxis.  Toe-touch weightbearing with walker.  Discharge planning with home health services versus inpatient rehab being likely options.        Electronically signed by Shiraz Chambers MD, 01/12/25, 1:27 PM EST.

## 2025-01-12 NOTE — OUTREACH NOTE
Medical Week 2 Survey      Flowsheet Row Responses   [facility] facility patient discharged from? [facility]   Does the patient have one of the following disease processes/diagnoses(primary or secondary)? Other   Week 2 attempt successful? No   Unsuccessful attempts Attempt 1   Revoke Readmitted            CHAITANYA JEREZ - Registered Nurse

## 2025-01-12 NOTE — CONSULTS
[facility] TeleNeurology Consult Services      Patient Name:   Nelson Wang  YOB: 1945  Identification Number:   MRN - [id number]  Date of Service:   01/12/2025 10:26:22    Diagnosis:        G93.41 - Encephalopathy Metabolic    Impression:       79 year old man with altered mental status and confusion most likely secondary to a toxic or metabolic encephalopathy.    Our recommendations are outlined below.    Recommendations:          Stroke/Telemetry Floor        Neuro Checks        Bedside Swallow Eval        DVT Prophylaxis        IV Fluids, Normal Saline        Head of Bed 30 Degrees        Euglycemia and Avoid Hyperthermia (PRN Acetaminophen)        Initiate or continue Aspirin 81 MG daily        Antihypertensives PRN if Blood pressure is greater than 220/120 or there is a concern for End organ damage/contraindications for permissive HTN. If blood pressure is greater than 220/120 give labetalol PO or IV or Vasotec IV with a goal of 15% reduction in BP during the first 24 hours.    Sign Out:        Discussed with Rapid Response Team        ------------------------------------------------------------------------------    Advanced Imaging:  Advanced Imaging Deferred because:    Poor functional status at baseline, a greater risk than benefit with acute intervention      Metrics:  Last Known Well: 01/12/2025 01:00:00  Dispatch Time: 01/12/2025 10:24:45  Initial Response Time: 01/12/2025 10:29:34  Symptoms: Altered Mental Status .  Initial patient interaction: 01/12/2025 10:45:12  NIHSS Assessment Completed: 01/12/2025 10:45:24  Patient is not a candidate for Thrombolytic.  Thrombolytic Medical Decision: 01/12/2025 10:45:26  Patient was not deemed candidate for Thrombolytic because of following reasons:  LKW outside 4.5 hr window. .    I personally Reviewed the CT Head and it Showed no acute findings.    ED Physician not notified of diagnostic impression and management plan because Discussed  with the rapid response team        ------------------------------------------------------------------------------    History of Present Illness:  Patient is a 79 year old Male.    Inpatient stroke alert was called for symptoms of Altered Mental Status .  79 year old man that is admitted to the hospital for renal failure that is consulted for worsening confusion and altered mental status since yesterday. There is no reported unilateral weakness, numbness nor tingling. There is nor reported facial droop. The history was taken from his daughter who is at bedside and she is a physician as he is not a reliable historian.      Past Medical History:       Hypertension       Diabetes Mellitus       Hyperlipidemia       Atrial Fibrillation    Medications:    No Anticoagulant use   No Antiplatelet use  Reviewed EMR for current medications    Allergies:   NKDA    Social History:  Smoking: Former    Family History:    There is no family history of premature cerebrovascular disease pertinent to this consultation    ROS :  14 Points Review of Systems was performed and was negative except mentioned in HPI.    Past Surgical History:  There Is No Surgical History Contributory To Today’s Visit        Examination:  BP(90/44), Pulse(66), Blood Glucose(112)  1A: Level of Consciousness - Movements to Pain + 2  1B: Ask Month and Age - Could Not Answer Either Question Correctly + 2  1C: Blink Eyes & Squeeze Hands - Performs 0 Tasks + 2  2: Test Horizontal Extraocular Movements - Partial Gaze Palsy: Can Be Overcome + 1  3: Test Visual Fields - No Visual Loss + 0  4: Test Facial Palsy (Use Grimace if Obtunded) - Normal symmetry + 0  5A: Test Left Arm Motor Drift - Some Effort Against Gravity + 2  5B: Test Right Arm Motor Drift - Some Effort Against Gravity + 2  6A: Test Left Leg Motor Drift - Some Effort Against Gravity + 2  6B: Test Right Leg Motor Drift - Drift, hits bed + 2  7: Test Limb Ataxia (FNF/Heel-Shin) - Does Not Understand +  0  8: Test Sensation - Mild-Moderate Loss: Can Sense Being Touched + 1  9: Test Language/Aphasia - Mute/Global Aphasia: No Usable Speech/Auditory Comprehension + 3  10: Test Dysarthria - Mute/Anarthric + 2  11: Test Extinction/Inattention - Profound antoni-inattention (ex: does not recognize own hand) + 2    NIHSS Score: 23      Pre-Morbid Modified Holman Scale:  4 Points = Moderately severe disability; unable to walk and attend to bodily needs without assistance    I reviewed the available imaging via Rapid and initiated discussion with the primary provider    This consult was conducted in real time using interactive audio and video technology. Patient was informed of the technology being used for this visit and agreed to proceed. Patient located in hospital and provider located at home/office setting.      Patient is being evaluated for possible acute neurologic impairment and high probability of imminent or life-threatening deterioration. I spent total of 32 minutes providing care to this patient, including time for face to face visit via telemedicine, review of medical records, imaging studies and discussion of findings with providers, the patient and/or family.      Dr Kumar Tamez      [facility]  For Inpatient follow-up with [facility] physician please call [facility] at [phone]. As we are not an outpatient service for any post hospital discharge needs please contact the hospital for assistance.  If you have any questions for the [facility] physicians or need to reconsult for clinical or diagnostic changes please contact us via [facility] at [phone].

## 2025-01-13 ENCOUNTER — APPOINTMENT (OUTPATIENT)
Dept: NEUROLOGY | Facility: HOSPITAL | Age: 80
End: 2025-01-13
Payer: MEDICARE

## 2025-01-13 ENCOUNTER — APPOINTMENT (OUTPATIENT)
Dept: CARDIOLOGY | Facility: HOSPITAL | Age: 80
End: 2025-01-13
Payer: MEDICARE

## 2025-01-13 LAB
ANION GAP SERPL CALCULATED.3IONS-SCNC: 11.4 MMOL/L (ref 5–15)
AV MEAN PRESS GRAD SYS DOP V1V2: 7.6 MMHG
AV VMAX SYS DOP: 190.5 CM/SEC
B PARAPERT DNA SPEC QL NAA+PROBE: NOT DETECTED
B PERT DNA SPEC QL NAA+PROBE: NOT DETECTED
BH CV ECHO MEAS - AO MAX PG: 14.5 MMHG
BH CV ECHO MEAS - AO ROOT DIAM: 3.2 CM
BH CV ECHO MEAS - AO V2 VTI: 34.5 CM
BH CV ECHO MEAS - AVA(I,D): 1.9 CM2
BH CV ECHO MEAS - EDV(MOD-SP2): 75.3 ML
BH CV ECHO MEAS - EDV(MOD-SP4): 70.6 ML
BH CV ECHO MEAS - EF(MOD-SP2): 63.1 %
BH CV ECHO MEAS - EF(MOD-SP4): 71 %
BH CV ECHO MEAS - ESV(MOD-SP2): 27.8 ML
BH CV ECHO MEAS - ESV(MOD-SP4): 20.5 ML
BH CV ECHO MEAS - IVS/LVPW: 0.91 CM
BH CV ECHO MEAS - IVSD: 1 CM
BH CV ECHO MEAS - LA DIMENSION: 3.8 CM
BH CV ECHO MEAS - LAT PEAK E' VEL: 11.3 CM/SEC
BH CV ECHO MEAS - LV MAX PG: 4 MMHG
BH CV ECHO MEAS - LV MEAN PG: 2.3 MMHG
BH CV ECHO MEAS - LV V1 MAX: 100 CM/SEC
BH CV ECHO MEAS - LV V1 VTI: 21 CM
BH CV ECHO MEAS - LVIDD: 4.3 CM
BH CV ECHO MEAS - LVIDS: 2.6 CM
BH CV ECHO MEAS - LVOT DIAM: 2 CM
BH CV ECHO MEAS - LVPWD: 1.1 CM
BH CV ECHO MEAS - MED PEAK E' VEL: 8.7 CM/SEC
BH CV ECHO MEAS - MV A MAX VEL: 110.9 CM/SEC
BH CV ECHO MEAS - MV DEC SLOPE: 457 CM/SEC2
BH CV ECHO MEAS - MV E MAX VEL: 100.2 CM/SEC
BH CV ECHO MEAS - MV E/A: 0.9
BH CV ECHO MEAS - MV MAX PG: 5.7 MMHG
BH CV ECHO MEAS - MV MEAN PG: 2.4 MMHG
BH CV ECHO MEAS - MV P1/2T: 66 MSEC
BH CV ECHO MEAS - MV V2 VTI: 35.8 CM
BH CV ECHO MEAS - MVA(P1/2T): 3.3 CM2
BH CV ECHO MEAS - RAP SYSTOLE: 8 MMHG
BH CV ECHO MEAS - RVDD: 3.8 CM
BH CV ECHO MEAS - RVSP: 52 MMHG
BH CV ECHO MEAS - SV(MOD-SP2): 47.5 ML
BH CV ECHO MEAS - SV(MOD-SP4): 50.1 ML
BH CV ECHO MEAS - TR MAX PG: 44 MMHG
BH CV ECHO MEAS - TR MAX VEL: 330 CM/SEC
BH CV ECHO MEASUREMENTS AVERAGE E/E' RATIO: 10.02
BUN SERPL-MCNC: 29 MG/DL (ref 8–23)
BUN/CREAT SERPL: 6.2 (ref 7–25)
C PNEUM DNA NPH QL NAA+NON-PROBE: NOT DETECTED
CALCIUM SPEC-SCNC: 9.7 MG/DL (ref 8.6–10.5)
CHLORIDE SERPL-SCNC: 100 MMOL/L (ref 98–107)
CHOLEST SERPL-MCNC: 124 MG/DL (ref 0–200)
CO2 SERPL-SCNC: 24.6 MMOL/L (ref 22–29)
CREAT SERPL-MCNC: 4.68 MG/DL (ref 0.76–1.27)
DEPRECATED RDW RBC AUTO: 64.4 FL (ref 37–54)
EGFRCR SERPLBLD CKD-EPI 2021: 12.1 ML/MIN/1.73
ERYTHROCYTE [DISTWIDTH] IN BLOOD BY AUTOMATED COUNT: 19.6 % (ref 12.3–15.4)
FLUAV SUBTYP SPEC NAA+PROBE: NOT DETECTED
FLUBV RNA ISLT QL NAA+PROBE: NOT DETECTED
GEN 5 1HR TROPONIN T REFLEX: 738 NG/L
GLUCOSE BLDC GLUCOMTR-MCNC: 153 MG/DL (ref 70–99)
GLUCOSE BLDC GLUCOMTR-MCNC: 192 MG/DL (ref 70–99)
GLUCOSE BLDC GLUCOMTR-MCNC: 62 MG/DL (ref 70–99)
GLUCOSE BLDC GLUCOMTR-MCNC: 67 MG/DL (ref 70–99)
GLUCOSE BLDC GLUCOMTR-MCNC: 71 MG/DL (ref 70–99)
GLUCOSE BLDC GLUCOMTR-MCNC: 71 MG/DL (ref 70–99)
GLUCOSE BLDC GLUCOMTR-MCNC: 74 MG/DL (ref 70–99)
GLUCOSE BLDC GLUCOMTR-MCNC: 89 MG/DL (ref 70–99)
GLUCOSE SERPL-MCNC: 77 MG/DL (ref 65–99)
HADV DNA SPEC NAA+PROBE: NOT DETECTED
HBA1C MFR BLD: 5.6 % (ref 4.8–5.6)
HCOV 229E RNA SPEC QL NAA+PROBE: NOT DETECTED
HCOV HKU1 RNA SPEC QL NAA+PROBE: NOT DETECTED
HCOV NL63 RNA SPEC QL NAA+PROBE: NOT DETECTED
HCOV OC43 RNA SPEC QL NAA+PROBE: NOT DETECTED
HCT VFR BLD AUTO: 37.9 % (ref 37.5–51)
HDLC SERPL-MCNC: 31 MG/DL (ref 40–60)
HGB BLD-MCNC: 10.9 G/DL (ref 13–17.7)
HMPV RNA NPH QL NAA+NON-PROBE: NOT DETECTED
HPIV1 RNA ISLT QL NAA+PROBE: NOT DETECTED
HPIV2 RNA SPEC QL NAA+PROBE: NOT DETECTED
HPIV3 RNA NPH QL NAA+PROBE: NOT DETECTED
HPIV4 P GENE NPH QL NAA+PROBE: NOT DETECTED
LDLC SERPL CALC-MCNC: 69 MG/DL (ref 0–100)
LDLC/HDLC SERPL: 2.12 {RATIO}
LEFT ATRIUM VOLUME INDEX: 44 ML/M2
LV EF BIPLANE MOD: 67 %
M PNEUMO IGG SER IA-ACNC: NOT DETECTED
MAGNESIUM SERPL-MCNC: 2.1 MG/DL (ref 1.6–2.4)
MCH RBC QN AUTO: 26.9 PG (ref 26.6–33)
MCHC RBC AUTO-ENTMCNC: 28.8 G/DL (ref 31.5–35.7)
MCV RBC AUTO: 93.6 FL (ref 79–97)
MRSA DNA SPEC QL NAA+PROBE: NORMAL
PHOSPHATE SERPL-MCNC: 6.3 MG/DL (ref 2.5–4.5)
PLATELET # BLD AUTO: 226 10*3/MM3 (ref 140–450)
PMV BLD AUTO: 9.9 FL (ref 6–12)
POTASSIUM SERPL-SCNC: 5.3 MMOL/L (ref 3.5–5.2)
RBC # BLD AUTO: 4.05 10*6/MM3 (ref 4.14–5.8)
RHINOVIRUS RNA SPEC NAA+PROBE: NOT DETECTED
RSV RNA NPH QL NAA+NON-PROBE: NOT DETECTED
SARS-COV-2 RNA RESP QL NAA+PROBE: NOT DETECTED
SODIUM SERPL-SCNC: 136 MMOL/L (ref 136–145)
TRIGL SERPL-MCNC: 136 MG/DL (ref 0–150)
TROPONIN T % DELTA: -3 %
TROPONIN T NUMERIC DELTA: -20 NG/L
TROPONIN T SERPL HS-MCNC: 758 NG/L
VLDLC SERPL-MCNC: 24 MG/DL (ref 5–40)
WBC NRBC COR # BLD AUTO: 6.81 10*3/MM3 (ref 3.4–10.8)

## 2025-01-13 PROCEDURE — 95816 EEG AWAKE AND DROWSY: CPT

## 2025-01-13 PROCEDURE — 83036 HEMOGLOBIN GLYCOSYLATED A1C: CPT | Performed by: INTERNAL MEDICINE

## 2025-01-13 PROCEDURE — 99232 SBSQ HOSP IP/OBS MODERATE 35: CPT | Performed by: INTERNAL MEDICINE

## 2025-01-13 PROCEDURE — 94667 MNPJ CHEST WALL 1ST: CPT

## 2025-01-13 PROCEDURE — 80048 BASIC METABOLIC PNL TOTAL CA: CPT | Performed by: INTERNAL MEDICINE

## 2025-01-13 PROCEDURE — 93306 TTE W/DOPPLER COMPLETE: CPT

## 2025-01-13 PROCEDURE — 80061 LIPID PANEL: CPT | Performed by: INTERNAL MEDICINE

## 2025-01-13 PROCEDURE — 84484 ASSAY OF TROPONIN QUANT: CPT | Performed by: INTERNAL MEDICINE

## 2025-01-13 PROCEDURE — 82948 REAGENT STRIP/BLOOD GLUCOSE: CPT

## 2025-01-13 PROCEDURE — 94664 DEMO&/EVAL PT USE INHALER: CPT

## 2025-01-13 PROCEDURE — 25010000002 CEFEPIME PER 500 MG: Performed by: INTERNAL MEDICINE

## 2025-01-13 PROCEDURE — 94761 N-INVAS EAR/PLS OXIMETRY MLT: CPT

## 2025-01-13 PROCEDURE — 93306 TTE W/DOPPLER COMPLETE: CPT | Performed by: INTERNAL MEDICINE

## 2025-01-13 PROCEDURE — 85027 COMPLETE CBC AUTOMATED: CPT | Performed by: INTERNAL MEDICINE

## 2025-01-13 PROCEDURE — 99232 SBSQ HOSP IP/OBS MODERATE 35: CPT | Performed by: PSYCHIATRY & NEUROLOGY

## 2025-01-13 PROCEDURE — 94799 UNLISTED PULMONARY SVC/PX: CPT

## 2025-01-13 PROCEDURE — 87641 MR-STAPH DNA AMP PROBE: CPT | Performed by: PHYSICIAN ASSISTANT

## 2025-01-13 PROCEDURE — 95816 EEG AWAKE AND DROWSY: CPT | Performed by: PSYCHIATRY & NEUROLOGY

## 2025-01-13 PROCEDURE — 83735 ASSAY OF MAGNESIUM: CPT | Performed by: INTERNAL MEDICINE

## 2025-01-13 PROCEDURE — 84100 ASSAY OF PHOSPHORUS: CPT | Performed by: INTERNAL MEDICINE

## 2025-01-13 PROCEDURE — 99291 CRITICAL CARE FIRST HOUR: CPT | Performed by: INTERNAL MEDICINE

## 2025-01-13 PROCEDURE — 0202U NFCT DS 22 TRGT SARS-COV-2: CPT | Performed by: PHYSICIAN ASSISTANT

## 2025-01-13 PROCEDURE — 25010000002 HEPARIN (PORCINE) PER 1000 UNITS: Performed by: INTERNAL MEDICINE

## 2025-01-13 RX ORDER — FAMOTIDINE 20 MG/1
10 TABLET, FILM COATED ORAL DAILY
Status: DISCONTINUED | OUTPATIENT
Start: 2025-01-14 | End: 2025-01-19

## 2025-01-13 RX ORDER — DEXTROSE MONOHYDRATE 25 G/50ML
25 INJECTION, SOLUTION INTRAVENOUS
Status: DISCONTINUED | OUTPATIENT
Start: 2025-01-12 | End: 2025-01-19

## 2025-01-13 RX ORDER — IBUPROFEN 600 MG/1
1 TABLET ORAL
Status: DISCONTINUED | OUTPATIENT
Start: 2025-01-12 | End: 2025-01-19

## 2025-01-13 RX ORDER — NICOTINE POLACRILEX 4 MG
15 LOZENGE BUCCAL
Status: DISCONTINUED | OUTPATIENT
Start: 2025-01-12 | End: 2025-01-19

## 2025-01-13 RX ADMIN — DEXTROSE MONOHYDRATE 25 G: 25 INJECTION, SOLUTION INTRAVENOUS at 12:02

## 2025-01-13 RX ADMIN — HEPARIN SODIUM 5000 UNITS: 5000 INJECTION INTRAVENOUS; SUBCUTANEOUS at 09:13

## 2025-01-13 RX ADMIN — Medication 10 ML: at 09:12

## 2025-01-13 RX ADMIN — IPRATROPIUM BROMIDE AND ALBUTEROL SULFATE 3 ML: 2.5; .5 SOLUTION RESPIRATORY (INHALATION) at 06:29

## 2025-01-13 RX ADMIN — BUDESONIDE 0.5 MG: 0.5 INHALANT ORAL at 06:29

## 2025-01-13 RX ADMIN — HEPARIN SODIUM 5000 UNITS: 5000 INJECTION INTRAVENOUS; SUBCUTANEOUS at 21:20

## 2025-01-13 RX ADMIN — CEFEPIME HYDROCHLORIDE 1000 MG: 1 INJECTION, POWDER, FOR SOLUTION INTRAMUSCULAR; INTRAVENOUS at 21:20

## 2025-01-13 RX ADMIN — IPRATROPIUM BROMIDE AND ALBUTEROL SULFATE 3 ML: 2.5; .5 SOLUTION RESPIRATORY (INHALATION) at 19:07

## 2025-01-13 RX ADMIN — DEXTROSE MONOHYDRATE 25 G: 25 INJECTION, SOLUTION INTRAVENOUS at 21:27

## 2025-01-13 RX ADMIN — ARFORMOTEROL TARTRATE 15 MCG: 15 SOLUTION RESPIRATORY (INHALATION) at 19:08

## 2025-01-13 RX ADMIN — CEFEPIME 1000 MG: 1 INJECTION, POWDER, FOR SOLUTION INTRAMUSCULAR; INTRAVENOUS at 00:04

## 2025-01-13 RX ADMIN — Medication 10 ML: at 21:21

## 2025-01-13 RX ADMIN — WHITE PETROLATUM 57.7 %-MINERAL OIL 31.9 % EYE OINTMENT: at 21:24

## 2025-01-13 RX ADMIN — ASPIRIN 300 MG: 300 SUPPOSITORY RECTAL at 09:13

## 2025-01-13 RX ADMIN — ARFORMOTEROL TARTRATE 15 MCG: 15 SOLUTION RESPIRATORY (INHALATION) at 06:29

## 2025-01-13 RX ADMIN — BUDESONIDE 0.5 MG: 0.5 INHALANT ORAL at 19:07

## 2025-01-13 NOTE — PROGRESS NOTES
Respiratory Therapist Broncho-Pulmonary Hygiene Progress Note      Patient Name:  Nelson Wang  YOB: 1945    Nelson Wang meets the qualification for Level 2 of the Bronco-Pulmonary Hygiene Protocol. This was based on my daily patient assessment and includes review of chest x-ray results, cough ability and quality, oxygenation, secretions or risk for secretion development and patient mobility.     Broncho-Pulmonary Hygiene Assessment:    Level of Movement: Actively changing positions-requires assistance  Disoriented/Follows Commands    Breath Sounds: Diminished and/or coarse rhonchi    Cough: Ineffective, weak, frequent    Chest X-Ray: Presence of atelectasis and/or consolidation    Sputum Productions: None or small amount of thin or watery secretions with effective cough    History and Physical: Chronic condition    SpO2 to Oxygen Need: greater than 92% on 4-6L nasal canula    Current SpO2 is: 94% on 4L    Based on this information, I have completed the following interventions: CPT (precussor)      Electronically signed by Ольга Lane RRT, 01/13/25, 10:40 AM EST.

## 2025-01-13 NOTE — CONSULTS
"Nutrition Services    Patient Name: Nelson Wang  YOB: 1945  MRN: [id number]  Admission date: 1/11/2025      CLINICAL NUTRITION ASSESSMENT      Reason for Assessment  Identified at Risk by Screening Criteria -nurse consult   H&P:  Past Medical History:   Diagnosis Date    Abnormal stress test     Anemia     IRON INFUSIONS WITH DIALYSIS    Anesthesia     WITH HIP REPLACEMENT DAUGHTER BELIEVES HAD SVT 2000    Ankle pain, right     Anxiety and depression     Arthritis     CKD (chronic kidney disease), stage IV     DIALYSIS [id number] SHILEY IN RIGHT CHEST    Diabetes     GERD (gastroesophageal reflux disease)     Gout     High cholesterol     History of peritoneal dialysis     HL (hearing loss)     Hyperkalemia     Hypertension     Hypothyroidism     Insomnia     Night terrors     Psoriasis     Psoriasis     Sleep apnea     Sleep walking     Thrombocytopenia     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    Vitiligo         Current Problems:   Active Hospital Problems    Diagnosis     **ESRD (end stage renal disease) on dialysis         Nutrition/Diet History         Narrative   RD consulted for MST 3. Patient currently in ICU level care. Levophed ordered but not receiving at this time. Weight stable with fluctuations noted, likely related to fluids. Pt with ESRD on HD. Currently npo. Discussed during rounds. MD would like to hold NGT placement at this time and give patient 24 hours to become more alert and begin po diet. RD will monitor.      Anthropometrics        Current Height, Weight Height: 162.5 cm (63.98\")      Current BMI Body mass index is 24.39 kg/m².   BMI Classification Normal range   % %   Adjusted Body Weight (ABW) N/A   Weight Hx  Wt Readings from Last 30 Encounters:   01/08/25 0516 64.4 kg (141 lb 15.6 oz)   01/07/25 0500 66.6 kg (146 lb 13.2 oz)   01/06/25 0500 64.3 kg (141 lb 12.1 oz)   01/05/25 0500 65.1 kg (143 lb 8.3 oz)   01/04/25 0521 64 kg (141 lb 1.5 oz)   01/02/25 " 0417 64.4 kg (141 lb 15.6 oz)   01/01/25 0600 62 kg (136 lb 11 oz)   12/31/24 0600 62.7 kg (138 lb 3.7 oz)   12/30/24 0917 63.2 kg (139 lb 5.3 oz)   12/29/24 0500 61.8 kg (136 lb 3.9 oz)   12/28/24 0635 63 kg (138 lb 14.2 oz)   12/27/24 0541 63.1 kg (139 lb 1.8 oz)   12/26/24 0738 63.1 kg (139 lb 3.2 oz)   12/25/24 0333 67 kg (147 lb 11.3 oz)   12/24/24 0530 67.9 kg (149 lb 11.1 oz)   12/23/24 0418 68.1 kg (150 lb 2.1 oz)   12/22/24 0525 68.6 kg (151 lb 3.8 oz)   12/21/24 0500 75.4 kg (166 lb 3.6 oz)   12/20/24 0350 67.4 kg (148 lb 9.4 oz)   12/18/24 2009 65.3 kg (143 lb 15.4 oz)   12/18/24 0300 67 kg (147 lb 11.3 oz)   12/17/24 0440 65.8 kg (145 lb 1 oz)   12/16/24 0547 64.3 kg (141 lb 12.1 oz)   12/13/24 0407 62.5 kg (137 lb 12.6 oz)   12/12/24 0500 69.3 kg (152 lb 12.5 oz)   12/09/24 0600 70.2 kg (154 lb 12.2 oz)   12/06/24 1228 70.7 kg (155 lb 13.8 oz)   12/05/24 1850 75.3 kg (166 lb 0.1 oz)   12/01/24 1701 71 kg (156 lb 8.4 oz)   11/27/24 1247 71.6 kg (157 lb 12.8 oz)   11/22/24 1311 71.8 kg (158 lb 3.2 oz)   11/18/24 0351 67.9 kg (149 lb 11.1 oz)   11/17/24 0255 71.9 kg (158 lb 8.2 oz)   11/16/24 0316 67.9 kg (149 lb 11.1 oz)   11/14/24 0611 73.9 kg (162 lb 14.7 oz)   11/13/24 0500 73 kg (161 lb)   11/12/24 0523 73.2 kg (161 lb 6 oz)   11/11/24 1200 72.8 kg (160 lb 7.9 oz)   11/10/24 0500 74.4 kg (164 lb 0.4 oz)   11/08/24 0640 73 kg (160 lb 14.4 oz)   11/07/24 0621 72.7 kg (160 lb 3.2 oz)   11/05/24 0600 68.9 kg (151 lb 14.4 oz)   11/04/24 1402 67 kg (147 lb 11.3 oz)   11/04/24 0410 67.9 kg (149 lb 11.1 oz)   11/01/24 0159 74.3 kg (163 lb 12.8 oz)   10/27/24 0700 72.5 kg (159 lb 13.3 oz)   10/27/24 0346 75.7 kg (166 lb 14.2 oz)   10/09/24 1402 75.7 kg (166 lb 12.8 oz)   10/08/24 1455 75.3 kg (166 lb 0.1 oz)   10/02/24 0812 77.5 kg (170 lb 13.7 oz)   09/28/24 0330 77.2 kg (170 lb 3.1 oz)   09/27/24 1659 76.2 kg (167 lb 15.9 oz)   09/26/24 0823 78.9 kg (174 lb)   09/16/24 1814 79.3 kg (174 lb 13.2 oz)    09/12/24 1344 77.1 kg (169 lb 15.6 oz)   08/30/24 0927 75.6 kg (166 lb 10.7 oz)   08/14/24 0922 76.7 kg (169 lb)   07/17/24 1116 77.2 kg (170 lb 3.2 oz)   07/16/24 0817 73.8 kg (162 lb 11.2 oz)   07/10/24 0740 73.5 kg (162 lb)   06/17/24 1604 74.6 kg (164 lb 6.4 oz)   06/10/24 1348 80 kg (176 lb 5.9 oz)   06/06/24 1423 72 kg (158 lb 11.2 oz)   05/29/24 0720 72.9 kg (160 lb 11.5 oz)   05/28/24 1015 74.5 kg (164 lb 3.9 oz)   05/24/24 0917 74.6 kg (164 lb 6.4 oz)   05/03/24 1100 73 kg (160 lb 14.4 oz)   05/03/24 0838 74.6 kg (164 lb 6.4 oz)   05/01/24 0847 73.5 kg (162 lb)   04/24/24 1105 73.8 kg (162 lb 9.6 oz)   04/19/24 1300 74.5 kg (164 lb 3.2 oz)   04/14/24 1735 71.7 kg (158 lb 1.1 oz)   04/08/24 2128 78 kg (171 lb 15.3 oz)   03/24/24 0526 78 kg (171 lb 15.3 oz)          Wt Change Observation UTD, fluctuations noted related to fluids. Pt with ESRD on HD.     Estimated/Assessed Needs  Estimated Needs based on: Ideal Body Weight       Energy Requirements 30-35 kcal/kg    EST Needs (kcal/day) 9117-4681       Protein Requirements 1.2-1.3 g/kg   EST Daily Needs (g/day) 71-77       Fluid Requirements 20-25 ml/kg    Estimated Needs (mL/day) 7251-9831     Labs/Medications         Pertinent Labs Reviewed.   Results from last 7 days   Lab Units 01/13/25  0311 01/12/25  0734 01/11/25  1418   SODIUM mmol/L 136 130*  130* 137   POTASSIUM mmol/L 5.3* 4.6  4.6 4.4   CHLORIDE mmol/L 100 94*  94* 98   CO2 mmol/L 24.6 26.9  27.9 24.2   BUN mg/dL 29* 21  21 50*   CREATININE mg/dL 4.68* 3.81*  3.66* 6.07*   CALCIUM mg/dL 9.7 10.2  10.0 10.3   BILIRUBIN mg/dL  --  0.6  --    ALK PHOS U/L  --  115  --    ALT (SGPT) U/L  --  11  --    AST (SGOT) U/L  --  30  --    GLUCOSE mg/dL 77 112*  113* 92     Results from last 7 days   Lab Units 01/13/25  0311 01/12/25  1030 01/12/25  0734   MAGNESIUM mg/dL 2.1 2.1 2.1   PHOSPHORUS mg/dL 6.3*  --  4.4   HEMOGLOBIN g/dL 10.9* 10.9* 10.4*   HEMATOCRIT % 37.9 37.4* 35.5*   TRIGLYCERIDES  mg/dL 136  --   --      COVID19   Date Value Ref Range Status   01/13/2025 Not Detected Not Detected - Ref. Range Final     Lab Results   Component Value Date    HGBA1C 5.60 01/13/2025         Pertinent Medications Reviewed.     Malnutrition Severity Assessment              Nutrition Diagnosis         Nutrition Dx Problem 1 Inadequate oral Intake related to Inability to consume sufficient energy as evidenced by NPO.     Nutrition Intervention           Current Nutrition Orders & Evaluation of Intake       Current PO Diet NPO Diet NPO Type: Strict NPO   Supplement Orders Placed This Encounter      Dietary Nutrition Supplements Novasource Renal (Nepro)           Nutrition Intervention/Prescription        Patient currently npo. RD will monitor for diet advancement and provide nutrition interventions prn.   Advance diet as feasible.        Medical Nutrition Therapy/Nutrition Education          Learner     Readiness Patient  Education not indicated at this time     Method     Response N/A  N/A     Monitor/Evaluation        Monitor Per protocol, Pertinent labs, POC/GOC, Diet advancement     Nutrition Discharge Plan         To be determined     Electronically signed by:  Emma Collazo RD  01/13/25 12:42 EST

## 2025-01-13 NOTE — SIGNIFICANT NOTE
01/13/25 1100   Physical Therapy Time and Intention   Session Not Performed patient unavailable for evaluation  (EEG)

## 2025-01-13 NOTE — SIGNIFICANT NOTE
01/13/25 0944   OTHER   Discipline speech language pathologist   Rehab Time/Intention   Session Not Performed patient unavailable for evaluation   Recommendations   SLP - Next Appointment 01/14/25

## 2025-01-13 NOTE — SIGNIFICANT NOTE
01/13/25 0847   OTHER   Discipline occupational therapist   Rehab Time/Intention   Session Not Performed other (see comments)  (Hold, dialysis and EEG scheduled)

## 2025-01-13 NOTE — PROGRESS NOTES
[facility]  [facility] Tele[facility]urology Consult Services    Routine Consult Follow-Up    Patient Name:   Nelson Wang  YOB: 1945  Identification Number:   MRN - [id number]  Date of Service:   01/13/2025 15:03:50    Diagnosis        G93.41 - Encephalopathy Metabolic    Impression  79 year old man with altered mental status and confusion most likely secondary to a toxic or metabolic encephalopathy.    - Routine EEG  - MRI Brain wo [place]  - Continue to treat suspected infection, Cefepime in the setting of ESRD may also contribute to encephalopathy  - Neurology will follow    Our recommendations are outlined below    Nursing Recommendations :  Delirium precautions: Blinds open during the day, closed at night, frequent reorientation, minimize nighttime interruptionsWhen possible avoid benzodiazepines, opioid pain medications, and anticholinergic medications    Consultations :  Toxic metabolic work up per primary team    DVT Prophylaxis :  Choice of Primary Team    Disposition :  Neurology will follow    Subjective  Patient has had worsening mental status. He had an episode of hypotension. HE is on vancomycin and started on Cefepime. Patient is awake and alert but does not follow commands. Coughing more today. Reportedly this is a significant change from prior.    Hospital Course  79 year old man with a history of ESRD on HD, DM, R femur fracture, Dementia, discitis, Afib, restrictive lung disease altered mental status and confusion most likely secondary to a toxic or metabolic encephalopathy.    Imaging  HCT 1/11: No acute abnormality    Labs  Trop 738 A1c 5.6 LDL 69 RVP negative, Cr 4.68 Normal WBC       Examination  BP(128/56), Pulse(76), Temp(97.9), Resp(20),    Neuro Exam:  General: Alert,Awake  Not oriented    Speech: Does not speak or follow commands.    Face: Symmetric:    Extraocular Movements: Intact:  Tracking voice    Motor Exam: Spontaneous antigravity movement in the BLE and  SWAPNA.    Sensation: Grimaces to LT in all extremities.           This consult was conducted in real time using interactive audio and video technology. Patient was informed of the technology being used for this visit and agreed to proceed. Patient located in hospital and provider located at home/office setting.    Telehealth Neurology consultation was provided. I spent 15 minutes providing telehealth care. This includes time spent for face to face visit via telemedicine, review of medical records, imaging studies and discussion of findings with providers, the patient and/or family.      Dr Kisha Mary      [facility]  For Inpatient follow-up with [facility] physician please call [facility] at [phone]. As we are not an outpatient service for any post hospital discharge needs please contact the hospital for assistance.  If you have any questions for the [facility] physicians or need to reconsult for clinical or diagnostic changes please contact us via [facility] at [phone]

## 2025-01-13 NOTE — NURSING NOTE
Duration of Treatment 3.5 Hours  Access Site AVF  Dialyzer Revaclear   mL/min  Dialysate Temperature (C) 36  BFR-As tolerated to a maximum of: 400 mL/min  Prime Dialyzer With NS to Priming Volume? Yes  Dialysate Solution Bath: K+ = 3 mEq, CA = 2.5mg  Bicarb 30 meq  Na+ 137 meq  Fluid Removal: 1.0      Patient tolerated treatment well. Ran 3.5hrs, removed 1000mls UF with BVP of 82.6  Reported off to Ashlee MARADIAGA

## 2025-01-13 NOTE — CONSULTS
Pulmonary / Critical Care Consult Note      Patient Name: Nelson Wang  : 1945  MRN: [id number]  Primary Care Physician:  Domingo Bravo MD  Referring Physician: Emeka Grant MD  Date of admission: 2025    Subjective   Subjective     Reason for Consult/ Chief Complaint:   Altered mental status    HPI:  Nelson Wang is a 79 y.o. male with a history of end-stage renal disease on dialysis, type 2 diabetes, discitis having completed vancomycin came in after his home dialysis machine was not functioning.  Had RRT yesterday for hypotension.  Had worsening mental status and was moved to the ICU.  Neurology saw patient and felt this was more encephalopathy than stroke.  Patient's blood pressure is a little bit better on Levophed.  He is still very lethargic despite recovery of blood pressure.  He is grimacing to pain but not following commands.  He did have a bump in troponin after this hypotensive episode.  Also on antibiotics for potential infection.  Was dialyzed yesterday.          Personal History     Past Medical History:   Diagnosis Date    Abnormal stress test     Anemia     IRON INFUSIONS WITH DIALYSIS    Anesthesia     WITH HIP REPLACEMENT DAUGHTER BELIEVES HAD SVT     Ankle pain, right     Anxiety and depression     Arthritis     CKD (chronic kidney disease), stage IV     DIALYSIS [id number] SHILEY IN RIGHT CHEST    Diabetes     GERD (gastroesophageal reflux disease)     Gout     High cholesterol     History of peritoneal dialysis     HL (hearing loss)     Hyperkalemia     Hypertension     Hypothyroidism     Insomnia     Night terrors     Psoriasis     Psoriasis     Sleep apnea     Sleep walking     Thrombocytopenia     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    Vitiligo        Past Surgical History:   Procedure Laterality Date    ABDOMINAL SURGERY      ANKLE SURGERY Right 1990    APPENDECTOMY N/A     ARTERIOVENOUS FISTULA/SHUNT SURGERY Left 2024    Procedure: Creation  of left arm arteriovenous fistula;  Surgeon: Moses Mir MD;  Location: [facility] MAIN OR;  Service: Vascular;  Laterality: Left;    BRONCHOSCOPY N/A 03/01/2024    Procedure: BRONCHOSCOPY WITH BAL AND WASHINGS;  Surgeon: Sandra Espinal MD;  Location: [facility] ENDOSCOPY;  Service: Pulmonary;  Laterality: N/A;  PNEUMONIA    BRONCHOSCOPY N/A 08/30/2024    Procedure: BRONCHOSCOPY WITH BALS AND WASHINGS;  Surgeon: Sandra Espinal MD;  Location: [facility] ENDOSCOPY;  Service: Pulmonary;  Laterality: N/A;  MUCOUS PLUGGING    CARDIAC CATHETERIZATION N/A 05/29/2024    Procedure: Left Heart Cath with possible coronary angioplasty;  Surgeon: Stephan Nichols MD;  Location: [facility] CATH INVASIVE LOCATION;  Service: Cardiology;  Laterality: N/A;    COLONOSCOPY N/A 06/2022    [facility]    ENDOSCOPY N/A 10/28/2024    Procedure: ESOPHAGOGASTRODUODENOSCOPY INSERTION OF LIGHTED INSTRUMENT TO VIEW ESOPHAGUS, STOMACH AND SMALL INTESTINE;  Surgeon: Kingsley Peraza MD;  Location: [facility] ENDOSCOPY;  Service: Gastroenterology;  Laterality: N/A;  Gastritis    FRACTURE SURGERY      HIP BIPOLAR REPLACEMENT Right 01/2000    INSERTION HEMODIALYSIS CATHETER Left 04/09/2024    Procedure: HEMODIALYSIS CATHETER INSERTION;  Surgeon: Jose Berry MD;  Location: [facility] MAIN OR;  Service: General;  Laterality: Left;    INSERTION HEMODIALYSIS CATHETER N/A 04/12/2024    Procedure: Tunneled hemodialysis catheter insertion;  Surgeon: Enrique Vinson MD;  Location: [facility] MAIN OR;  Service: Vascular;  Laterality: N/A;    INSERTION PERITONEAL DIALYSIS CATHETER N/A 03/27/2023    Procedure: LAPAROSCOPIC INSERTION PERITONEAL DIALYSIS CATHETER, LAPAROSCOPIC OMENTOPEXY WITH LYSIS OF ADHESIONS;  Surgeon: Jose Berry MD;  Location: [facility] MAIN OR;  Service: General;  Laterality: N/A;    INSERTION PERITONEAL DIALYSIS CATHETER Left 07/23/2023    Procedure: REVISION OF PERITONEAL DIALYSIS CATHETER;  Surgeon: Radha Oreilly MD;   Location: [facility] OR;  Service: General;  Laterality: Left;    JOINT REPLACEMENT      REMOVAL PERITONEAL DIALYSIS CATHETER N/A 04/09/2024    Procedure: REMOVAL PERITONEAL DIALYSIS CATHETER;  Surgeon: Jose Berry MD;  Location: [facility] OR;  Service: General;  Laterality: N/A;    RENAL BIOPSY Left 07/15/2022    UPPER GASTROINTESTINAL ENDOSCOPY      VASCULAR SURGERY      WRIST SURGERY      UNSURE WHICH SIDE DAUGHTER REPORTS HAD SEVERE  CUT FROM WINDOW AND THEY HAD TO DO RECONSTRUCTIVE SURGERY WITH VESSELS AND NERVES       Family History: family history includes Cancer (age of onset: 35) in his sister; Colon cancer (age of onset: 77) in his sister; Diabetes in his brother, mother, and sister; Heart disease in his father and paternal uncle; Sleep apnea in his daughter and paternal aunt. Otherwise pertinent FHx was reviewed and not pertinent to current issue.    Social History:  reports that he quit smoking about 25 years ago. His smoking use included cigarettes. He started smoking about 35 years ago. He has a 10 pack-year smoking history. He has been exposed to tobacco smoke. He has never used smokeless tobacco. He reports that he does not currently use alcohol. He reports that he does not use drugs.    Home Medications:  Methoxy PEG-Epoetin Beta, QUEtiapine, arformoterol, atorvastatin, budesonide, famotidine, ipratropium-albuterol, levothyroxine, midodrine, and sevelamer    Allergies:  No Known Allergies    Objective    Objective     Vitals:   Temp:  [97.2 °F (36.2 °C)-98.4 °F (36.9 °C)] 98.4 °F (36.9 °C)  Heart Rate:  [66-79] 73  Resp:  [11-28] 16  BP: ()/() 120/54  Flow (L/min) (Oxygen Therapy):  [1-4] 4    Physical Exam:  Vital Signs Reviewed   General:  WDWN, Alert, NAD.    HEENT:  PERRL, EOMI.  OP, nares clear  Neck:  Supple, no JVD, no thyromegaly  Chest:  good aeration, diminished with crackles bilaterally, tympanic to percussion bilaterally, no work of breathing noted  CV: RRR,  no MGR, pulses 2+, equal.  Abd:  Soft, NT, ND, + BS, no HSM  EXT:  no clubbing, no cyanosis, no edema  Neuro:  A&Ox0, very confused, withdrawing to pain, CN grossly intact, no focal deficits.  Patient did have downward right sided gaze that resolved with painful stimuli  Skin: No rashes or lesions noted      Result Review    Result Review:  I have personally reviewed the results from the time of this admission to 1/13/2025 06:54 EST and agree with these findings:  [x]  Laboratory  [x]  Microbiology  [x]  Radiology  [x]  EKG/Telemetry   [x]  Cardiology/Vascular   []  Pathology  [x]  Old records  []  Other:  Most notable findings include:       Lab 01/13/25  0311 01/12/25  1030 01/12/25  0734 01/11/25  1418 01/07/25  0518   WBC 6.81 7.11 7.79 5.43 4.11   HEMOGLOBIN 10.9* 10.9* 10.4* 10.7* 7.2*   HEMATOCRIT 37.9 37.4* 35.5* 35.2* 24.9*   PLATELETS 226 237 209 224 220   SODIUM 136  --  130*  130* 137 137   POTASSIUM 5.3*  --  4.6  4.6 4.4 4.7   CHLORIDE 100  --  94*  94* 98 100   CO2 24.6  --  26.9  27.9 24.2 27.1   BUN 29*  --  21  21 50* 47*   CREATININE 4.68*  --  3.81*  3.66* 6.07* 5.06*   GLUCOSE 77  --  112*  113* 92 92   CALCIUM 9.7  --  10.2  10.0 10.3 10.3   PHOSPHORUS 6.3*  --  4.4  --  4.5   TOTAL PROTEIN  --   --  6.8  --   --    ALBUMIN  --   --  3.0*  --  2.9*   GLOBULIN  --   --  3.8  --   --      CT Head Without Contrast Stroke Protocol    Result Date: 1/12/2025  CT HEAD WO CONTRAST STROKE PROTOCOL Date of Exam: 1/12/2025 10:31 AM EST Indication: stroke r/o. Comparison: CT head 12/30/2024 Technique: Axial CT images were obtained of the head without contrast administration.  Reconstructed coronal and sagittal images were also obtained. Automated exposure control and iterative construction methods were used. Scan Time: 10:43 a.m. Results discussed with Nathaniel at 10:56 a.m.. Findings: There is no evidence of acute infarction. There there is no acute intracranial hemorrhage. There are no extra-axial  collections. Ventricles and CSF spaces are symmetric. No mass effect nor hydrocephalus. There are periventricular white matter changes and cerebral atrophy which appears age-related.  There is fluid opacification of the right mastoid air cells. Paranasal sinuses appear aerated.  Osseous structures and orbits appear intact. There is some soft tissue prominence along the right posterior calvarium which may represent a small hematoma.     Impression: Impression: Stable examination without acute intracranial process. Electronically Signed: Margaret Hammond MD  1/12/2025 10:57 AM EST  Workstation ID: [id number]    CT Head Without Contrast    Result Date: 12/30/2024  CT HEAD WO CONTRAST Date of Exam: 12/30/2024 10:23 PM EST Indication: altered mental status. Comparison: CT head without contrast 12/28/2024 Technique: Axial CT images were obtained of the head without contrast administration.  Reconstructed coronal and sagittal images were also obtained. Automated exposure control and iterative construction methods were used. Findings: Image quality slightly degraded due to motion. There is no intracranial hemorrhage. No mass effect or midline shift. Generalized cerebral volume loss unchanged. Moderate white matter findings suggesting chronic microvascular disease. No intraventricular hemorrhage. Posterior fossa without acute abnormality. No abnormal extra-axial fluid collection. The globes are symmetric. There is no retro-orbital abnormality. The mastoid air cells are well-aerated on the left. Small amount of fluid in the right mastoid air cells. No sinus fluid level. Negative for calvarial fracture.     Impression: Impression: 1. No intracranial hemorrhage or acute intracranial abnormality.. 2. Generalized cerebral atrophy with moderate white matter findings of chronic microvascular disease. Electronically Signed: Lasha Ramos MD  12/30/2024 10:38 PM EST  Workstation ID: [id number]    CT Head Without Contrast    Result Date:  12/28/2024  CT CERVICAL SPINE WO CONTRAST-, CT HEAD WO CONTRAST-  (COMBINED REPORT)  Date of exam: 12/28/2024, 10:12 P.M.  Indication: fall; j96.01-acute respiratory failure with hypoxia; j81.0-acute pulmonary edema; r26.2-difficulty in walking, not elsewhere classified; r13.12-dysphagia, oropharyngeal phase  Comparisons: 12/13/2024; 11/2/2024.  TECHNIQUE: Axial CT images were obtained of the head & cervical spine without contrast administration. Reconstructed 2D (two-dimensional) coronal and sagittal images were also obtained. Automated exposure control and iterative construction methods were used. Additionally, the radiation dose reduction device was turned on for each scan per the ALARA (As Low as Reasonably Achievable) protocol. Please see the electronic medical record, EMR (i.e., Epic), for the documented radiation dose.  EXAM FINDINGS:   HEAD CT: A routine nonenhanced head CT was performed. No acute brain abnormality is seen. No acute infarct. No acute intracranial hemorrhage. No midline shift or acute intracranial mass effect is seen. The ventricular system and the extra-axial spaces are prominent, suggesting central atrophy. There is suspected moderate-to-severe chronic small vessel ischemic disease. Arterial calcifications are seen. No acute skull fracture is identified. The patient has undergone bilateral cataract extractions with intra-ocular lens implants. Age-indeterminate congestive and/or inflammatory changes involve the right mastoid air cell complex, seen previously. Grossly, no significant acute findings are seen within the imaged paranasal sinuses. There is a suspected mild acute scalp contusion along the high right parietal region. The study is motion limited.  CONCLUSION: No acute brain abnormality is seen. No acute intracranial hemorrhage. No acute skull fracture.   CERVICAL SPINE CT: A routine nonenhanced cervical spine CT was performed. Again, sagittal and coronal two-dimensional (2D)  reformations are provided for review. The study is motion-limited. Grossly, no acute cervical spine fracture or acute malalignment is identified. Moderate-to-severe degenerative changes are seen at multiple levels with chronic malalignment. 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 Small nonspecific bilateral cervical lymph nodes are seen. There are arterial calcifications. If symptoms or clinical concerns persist, consider imaging follow-up.  CONCLUSION: Grossly, no acute cervical spine fracture is seen. Grossly, no acute subluxation abnormality. The study is motion-limited.    Portions of this note were completed with a voice recognition program.  Electronically Signed By-Stan Hogan MD On:12/28/2024 11:23 PM       Troponin up to 700  Previous hospital records reviewed  BNP 19,000  Cultures so far pending/negative  Chest x-ray bilateral airspace disease and multiple lung infiltrates      Assessment & Plan   Assessment / Plan     Active Hospital Problems:  Active Hospital Problems    Diagnosis     **ESRD (end stage renal disease) on dialysis        Impression:  Altered mental status  Toxic/metabolic encephalopathy  Hypotension  NSTEMI, likely from demand ischemia/type II  End-stage renal disease on hemodialysis  Possible sepsis  Stroke rule out  Type 2 diabetes  Discitis status post course of vancomycin  Atrial fibrillation  Small chronic right pleural effusion  Femur fracture    Plan:  Wean norepinephrine to keep mean arterial pressure greater than 65  Dialyze patient again today to see if that helps him wake up  Appreciate orthopedic surgery assistance.  Recommended nonoperative management at this time  Troponins did bump quite a bit likely secondary to hypotension and decompensation yesterday in the setting of end-stage renal disease.  Will check limited echo to evaluate ejection fraction  Agree with empiric antibiotics at this time.  Will de-escalate  further based off cultures.  Currently on vancomycin and cefepime.  DC vancomycin if MRSA PCR is negative  Limit all sedating medications at this time other than nightly Seroquel  Continue nebulizers and add bronchopulmonary hygiene  Check EEG  Appreciate neurology input    Discussed with family    VTE Prophylaxis:  Pharmacologic & mechanical VTE prophylaxis orders are present.    Code Status and Medical Interventions: No CPR (Do Not Attempt to Resuscitate); Limited Support; No intubation (DNI)   Ordered at: 01/11/25 1634     Medical Intervention Limits:    No intubation (DNI)     Code Status (Patient has no pulse and is not breathing):    No CPR (Do Not Attempt to Resuscitate)     Medical Interventions (Patient has pulse or is breathing):    Limited Support        The patient is critically ill in the ICU with altered mental status, toxic/metabolic cephalopathy, NSTEMI, hypotension requiring pressors, end-stage renal disease. Multidisciplinary bedside critical care rounds were performed with nursing staff, respiratory therapy, pharmacy, nutritional services, social work. I have personally reviewed the chart, labs and any pertinent imaging available.  I have spent 36 minutes of critical care time, excluding procedures, in the care of this patient.    Electronically signed by Scottie Valentin MD, 01/13/25, 2:18 PM EST.

## 2025-01-13 NOTE — PLAN OF CARE
Goal Outcome Evaluation:      Pt has been resting in the bed throughout the shift with family at the bedside off and on. VSS throughout the shift and has not needed any levo, even during HD vitals remained WNL. Pt has been pretty lethargic through entirety of shift, but has slowly started moving around more. Pt has been NPO due to mental status, blood sugars have been checked q6h and D50 had to be given once. Teleneuro did order MRI and EEG today. EEG was completed. Daughter requests that the MRI not to be completed at this time due to her not thinking he will lay still for the imaging and doesn't want any medications given to him that would make him sleepy. VSS at this time. No signs of acute distress noted at this time. Plan of care ongoing. Call light within reach.          Dana Rodrigues RN

## 2025-01-13 NOTE — PROGRESS NOTES
[facility] Clinical Pharmacy Services: Renal Dose Adjustment     Famotidine has been appropriately renally dose adjusted based on our System P&T approved policy. Pharmacy will continue to monitor patient renal function while in-house.     Meron Grady  Clinical Pharmacist

## 2025-01-13 NOTE — PROGRESS NOTES
[facility]   Hospitalist Progress Note  Date: 2025  Patient Name: Nelson Wang  : 1945  MRN: [id number]  Date of admission: 2025  Room/Bed: I06      Subjective   Subjective     Chief Complaint:   Home hemodialysis malfunction    Summary:  Nelson Wang is a 79 y.o. male with ESRD on dialysis, type 2 diabetes, dementia, discitis having completed vancomycin, A-fib, restrictive lung disease.  Patient recently discharged following long hospitalization, admitted from 2024 through 2025.  Patient discharged home with in-home dialysis.  Unfortunately today patient's dialysis machine not working.  Patient's primary nephrologist was contacted, unable to get patient into other outpatient dialysis facilities.  Therefore, decision was made to direct admit patient into the hospital for dialysis as he has not had dialysis for 3 days.  Hospitalist service contacted for admission orders, nephrology consulted for dialysis needs.     RRT called morning of .  Patient was found to have fixed gaze to the right.  Patient only responding to painful stimuli minimally.  Stat CT obtained showing stable examination without acute intracranial process.  Teleneurology consulted during stroke RRT.  Felt altered mental status and confusion most likely secondary to toxic or metabolic etiology.  During stroke RRT patient noted to be hypotensive, while fluid bolus running, daughter endorses improvement in physical exam.  Patient was transferred to a telemetry bed, blood pressures again started drifting down therefore felt safest to transfer patient to the ICU for pressors if needed.    Patient's CT scan of pelvis does reveal right periprosthetic proximal femur fracture.  Orthopedic surgery consulted and able to talk with patient and daughter at bedside.  At this time plan is for conservative measures, continue nonoperative treatment with toe-touch as able, continue to work with PT and OT and follow-up in clinic  clos    Interval Followup:   Patient remained stable in the ICU.  Plan is for dialysis today.  Plan is for patient to undergo EEG, MRI also ordered.  Previous admissions patient had trouble laying still for MRI, will see how patient does.  Labs also revealed a bump in troponin, this was associated with hypotensive episodes.  Echocardiogram has been ordered.  Patient also initiated on empiric antibiotics given hypotensive episodes yesterday.  MRSA PCR negative therefore vancomycin discontinued.    Objective   Objective     Vitals:   Temp:  [97.3 °F (36.3 °C)-98.4 °F (36.9 °C)] 97.9 °F (36.6 °C)  Heart Rate:  [66-78] 75  Resp:  [11-28] 19  BP: (104-140)/(43-97) 125/59  Flow (L/min) (Oxygen Therapy):  [1-4] 2    Physical Exam   General: Chronically ill-appearing and frail.     Cardiovascular: Irregularly irregular, rate controlled  Pulmonary: clear, no w/r/r   Gastrointestinal: Soft nontender nondistended positive bowel sounds all 4 quadrants no rebound or guarding  Neuro: Not responding to verbal stimuli.  Does withdrawal to painful stimuli.  Fixed gaze to the right.       Result Review    Result Review:  I have personally reviewed these results:  [x]  Laboratory      Lab 01/13/25 0311 01/12/25  1852 01/12/25  1030 01/12/25  0734 01/11/25  1418   WBC 6.81  --  7.11 7.79 5.43   HEMOGLOBIN 10.9*  --  10.9* 10.4* 10.7*   HEMATOCRIT 37.9  --  37.4* 35.5* 35.2*   PLATELETS 226  --  237 209 224   NEUTROS ABS  --   --   --   --  2.41   IMMATURE GRANS (ABS)  --   --   --   --  0.02   LYMPHS ABS  --   --   --   --  1.85   MONOS ABS  --   --   --   --  0.69   EOS ABS  --   --   --   --  0.42*   MCV 93.6  --  92.1 92.2 88.7   PROCALCITONIN  --   --  0.47*  --   --    LACTATE  --  1.1 1.7  --   --    PROTIME  --   --  13.7  --   --          Lab 01/13/25  0311 01/12/25  1030 01/12/25  0734 01/11/25  1418 01/07/25  0518 01/07/25  0518   SODIUM 136  --  130*  130* 137  --  137   POTASSIUM 5.3*  --  4.6  4.6 4.4  --  4.7    CHLORIDE 100  --  94*  94* 98  --  100   CO2 24.6  --  26.9  27.9 24.2  --  27.1   ANION GAP 11.4  --  9.1  8.1 14.8  --  9.9   BUN 29*  --  21  21 50*  --  47*   CREATININE 4.68*  --  3.81*  3.66* 6.07*  --  5.06*   EGFR 12.1*  --  15.5*  16.2* 8.8*  --  11.0*   GLUCOSE 77  --  112*  113* 92  --  92   CALCIUM 9.7  --  10.2  10.0 10.3  --  10.3   MAGNESIUM 2.1 2.1 2.1 2.1   < >  --    PHOSPHORUS 6.3*  --  4.4  --   --  4.5   HEMOGLOBIN A1C 5.60  --   --   --   --   --     < > = values in this interval not displayed.         Lab 01/12/25  0734 01/07/25  0518   TOTAL PROTEIN 6.8  --    ALBUMIN 3.0* 2.9*   GLOBULIN 3.8  --    ALT (SGPT) 11  --    AST (SGOT) 30  --    BILIRUBIN 0.6  --    ALK PHOS 115  --          Lab 01/13/25  0421 01/13/25  0311 01/12/25  1030   HSTROP T 738* 758*  --    PROTIME  --   --  13.7   INR  --   --  1.03         Lab 01/13/25  0311   CHOLESTEROL 124   LDL CHOL 69   HDL CHOL 31*   TRIGLYCERIDES 136         Lab 01/07/25  1136   ABO TYPING AB   RH TYPING Positive   ANTIBODY SCREEN Negative         Brief Urine Lab Results  (Last result in the past 365 days)        Color   Clarity   Blood   Leuk Est   Nitrite   Protein   CREAT   Urine HCG        10/27/24 1417 Yellow   Cloudy   Negative   Trace   Negative   >=300 mg/dL (3+)                 [x]  Microbiology   Microbiology Results (last 10 days)       Procedure Component Value - Date/Time    MRSA Screen, PCR (Inpatient) - Swab, Nares [[id number]]  (Normal) Collected: 01/13/25 075    Lab Status: Final result Specimen: Swab from Nares Updated: 01/13/25 0911     MRSA PCR No MRSA Detected    Narrative:      The negative predictive value of this diagnostic test is high and should only be used to consider de-escalating anti-MRSA therapy. A positive result may indicate colonization with MRSA and must be correlated clinically.    Respiratory Panel PCR w/COVID-19(SARS-CoV-2) RA/TANIA/GAVIN/PAD/[facility] In-House, NP Swab in UTM/VTM, 2 HR TAT - Swab,  Nasopharynx [[id number]]  (Normal) Collected: 01/13/25 0754    Lab Status: Final result Specimen: Swab from Nasopharynx Updated: 01/13/25 0850     ADENOVIRUS, PCR Not Detected     Coronavirus 229E Not Detected     Coronavirus HKU1 Not Detected     Coronavirus NL63 Not Detected     Coronavirus OC43 Not Detected     COVID19 Not Detected     Human Metapneumovirus Not Detected     Human Rhinovirus/Enterovirus Not Detected     Influenza A PCR Not Detected     Influenza B PCR Not Detected     Parainfluenza Virus 1 Not Detected     Parainfluenza Virus 2 Not Detected     Parainfluenza Virus 3 Not Detected     Parainfluenza Virus 4 Not Detected     RSV, PCR Not Detected     Bordetella pertussis pcr Not Detected     Bordetella parapertussis PCR Not Detected     Chlamydophila pneumoniae PCR Not Detected     Mycoplasma pneumo by PCR Not Detected    Narrative:      In the setting of a positive respiratory panel with a viral infection PLUS a negative procalcitonin without other underlying concern for bacterial infection, consider observing off antibiotics or discontinuation of antibiotics and continue supportive care. If the respiratory panel is positive for atypical bacterial infection (Bordetella pertussis, Chlamydophila pneumoniae, or Mycoplasma pneumoniae), consider antibiotic de-escalation to target atypical bacterial infection.          [x]  Radiology  EEG    Result Date: 1/13/2025  Underlying background suggest diffuse cerebral dysfunction of moderate degree, most commonly seen due to toxic/metabolic cause The triphasic sharp waves are abnormal but nonspecific.  These are most commonly seen due to significant toxic/metabolic disarray, most notably liver or kidney dysfunction.  They may also be seen as a side effect of certain medications such as cefepime.  Less likely, they can also be found on the ictal/interictal spectrum This report is transcribed using the Dragon dictation system.      XR Chest 1 View    Result Date:  1/12/2025  Increased bilateral infiltrates are seen. The findings may represent infectious multifocal pneumonia. Aspiration pneumonia cannot be excluded. Pulmonary edema is possible. A combination of these differential considerations may exist. Please see above comments for further detail.    Portions of this note were completed with a voice recognition program.  Electronically Signed By-Stan Hogan MD On:1/12/2025 11:24 PM      CT Head Without Contrast Stroke Protocol    Result Date: 1/12/2025  Impression: Stable examination without acute intracranial process. Electronically Signed: Margaret Hammond MD  1/12/2025 10:57 AM EST  Workstation ID: [id number]    CT Pelvis Without Contrast    Result Date: 1/11/2025  There is a suspected acute-to-subacute nondisplaced periprosthetic fracture involving the proximal right femur, as discussed. No other acute fractures are seen. No dislocation.   Portions of this note were completed with a voice recognition program.  Electronically Signed By-Stan Hogan MD On:1/11/2025 9:15 PM     []  EKG/Telemetry   []  Cardiology/Vascular   []  Pathology  []  Old records  []  Other:    Assessment & Plan   Assessment / Plan     Assessment:  Stroke rule out  Encephalopathy  End-stage renal disease on dialysis  Hypotension  Type 2 diabetes  Acute to subacute nondisplaced periprosthetic fracture involving proximal right femur  Dementia  Discitis, completed course of vancomycin  Atrial fibrillation not on anticoagulation due to falls  Restrictive lung disease     Plan:  Patient remains in the ICU  Neurology, nephrology, pulmonary critical care team consulted, appreciate assistance  Remains on stroke order set, MRI has been ordered  EEG has been ordered  Patient does have norepinephrine available to maintain blood pressures  Patient will receive dialysis today  Given episodes of hypotension patient was started on antibiotics, discontinuing vancomycin, MRSA of the nares negative.  Continue  cefepime  Echocardiogram has been ordered, elevated troponins during episodes of hypotension  Patient able to meet with orthopedic surgery, acute to subacute nondisplaced periprosthetic fracture involving proximal right femur  Nonoperative management at this time, weightbearing toe-touch as tolerated, continue PT and OT as able  Home medications reviewed and resumed as indicated as discussed with patient's daughter at bedside  CODE STATUS maintained is DNR/DNI as discussed with daughter at bedside      Ordering hospital bed for patient.  The patient has a medical condition which required positioning of the body in ways not feasible in an ordinary bed. Patient requires frequent and immediate changes in body position. The patient also requires the HOB to be elevated more than 30 degrees due to their shortness of breath and recurrent pneumonia due to aspiration.       Discussed with RN.  Discussed with daughter, discussed with critical care team    VTE Prophylaxis:  Pharmacologic & mechanical VTE prophylaxis orders are present.        CODE STATUS:   Medical Intervention Limits: No intubation (DNI)  Code Status (Patient has no pulse and is not breathing): No CPR (Do Not Attempt to Resuscitate)  Medical Interventions (Patient has pulse or is breathing): Limited Support      Electronically signed by Emeka Grant MD, 1/13/2025, 17:37 EST.

## 2025-01-14 PROBLEM — E44.0 MODERATE PROTEIN-CALORIE MALNUTRITION: Status: ACTIVE | Noted: 2025-01-14

## 2025-01-14 LAB
ALBUMIN SERPL-MCNC: 3 G/DL (ref 3.5–5.2)
ALBUMIN/GLOB SERPL: 0.7 G/DL
ALP SERPL-CCNC: 114 U/L (ref 39–117)
ALT SERPL W P-5'-P-CCNC: 11 U/L (ref 1–41)
ANION GAP SERPL CALCULATED.3IONS-SCNC: 10.3 MMOL/L (ref 5–15)
AST SERPL-CCNC: 41 U/L (ref 1–40)
BASOPHILS # BLD AUTO: 0.04 10*3/MM3 (ref 0–0.2)
BASOPHILS NFR BLD AUTO: 0.6 % (ref 0–1.5)
BILIRUB SERPL-MCNC: 0.6 MG/DL (ref 0–1.2)
BUN SERPL-MCNC: 14 MG/DL (ref 8–23)
BUN/CREAT SERPL: 4.7 (ref 7–25)
CALCIUM SPEC-SCNC: 9.2 MG/DL (ref 8.6–10.5)
CHLORIDE SERPL-SCNC: 99 MMOL/L (ref 98–107)
CO2 SERPL-SCNC: 24.7 MMOL/L (ref 22–29)
CREAT SERPL-MCNC: 3.01 MG/DL (ref 0.76–1.27)
D-LACTATE SERPL-SCNC: 1.4 MMOL/L (ref 0.5–2)
DEPRECATED RDW RBC AUTO: 62.8 FL (ref 37–54)
EGFRCR SERPLBLD CKD-EPI 2021: 20.5 ML/MIN/1.73
EOSINOPHIL # BLD AUTO: 0.12 10*3/MM3 (ref 0–0.4)
EOSINOPHIL NFR BLD AUTO: 1.8 % (ref 0.3–6.2)
ERYTHROCYTE [DISTWIDTH] IN BLOOD BY AUTOMATED COUNT: 18.6 % (ref 12.3–15.4)
GLOBULIN UR ELPH-MCNC: 4.1 GM/DL
GLUCOSE BLDC GLUCOMTR-MCNC: 103 MG/DL (ref 70–99)
GLUCOSE BLDC GLUCOMTR-MCNC: 109 MG/DL (ref 70–99)
GLUCOSE BLDC GLUCOMTR-MCNC: 64 MG/DL (ref 70–99)
GLUCOSE BLDC GLUCOMTR-MCNC: 70 MG/DL (ref 70–99)
GLUCOSE BLDC GLUCOMTR-MCNC: 71 MG/DL (ref 70–99)
GLUCOSE BLDC GLUCOMTR-MCNC: 78 MG/DL (ref 70–99)
GLUCOSE SERPL-MCNC: 153 MG/DL (ref 65–99)
HCT VFR BLD AUTO: 34.6 % (ref 37.5–51)
HGB BLD-MCNC: 9.9 G/DL (ref 13–17.7)
IMM GRANULOCYTES # BLD AUTO: 0.03 10*3/MM3 (ref 0–0.05)
IMM GRANULOCYTES NFR BLD AUTO: 0.5 % (ref 0–0.5)
LYMPHOCYTES # BLD AUTO: 1.12 10*3/MM3 (ref 0.7–3.1)
LYMPHOCYTES NFR BLD AUTO: 17 % (ref 19.6–45.3)
MAGNESIUM SERPL-MCNC: 1.8 MG/DL (ref 1.6–2.4)
MCH RBC QN AUTO: 26.7 PG (ref 26.6–33)
MCHC RBC AUTO-ENTMCNC: 28.6 G/DL (ref 31.5–35.7)
MCV RBC AUTO: 93.3 FL (ref 79–97)
MONOCYTES # BLD AUTO: 0.7 10*3/MM3 (ref 0.1–0.9)
MONOCYTES NFR BLD AUTO: 10.6 % (ref 5–12)
NEUTROPHILS NFR BLD AUTO: 4.57 10*3/MM3 (ref 1.7–7)
NEUTROPHILS NFR BLD AUTO: 69.5 % (ref 42.7–76)
NRBC BLD AUTO-RTO: 0 /100 WBC (ref 0–0.2)
PHOSPHATE SERPL-MCNC: 3.3 MG/DL (ref 2.5–4.5)
PLATELET # BLD AUTO: 187 10*3/MM3 (ref 140–450)
PMV BLD AUTO: 9.7 FL (ref 6–12)
POTASSIUM SERPL-SCNC: 4.2 MMOL/L (ref 3.5–5.2)
PROT SERPL-MCNC: 7.1 G/DL (ref 6–8.5)
QT INTERVAL: 319 MS
QTC INTERVAL: 442 MS
RBC # BLD AUTO: 3.71 10*6/MM3 (ref 4.14–5.8)
SODIUM SERPL-SCNC: 134 MMOL/L (ref 136–145)
WBC NRBC COR # BLD AUTO: 6.58 10*3/MM3 (ref 3.4–10.8)

## 2025-01-14 PROCEDURE — 83605 ASSAY OF LACTIC ACID: CPT | Performed by: INTERNAL MEDICINE

## 2025-01-14 PROCEDURE — 99291 CRITICAL CARE FIRST HOUR: CPT | Performed by: INTERNAL MEDICINE

## 2025-01-14 PROCEDURE — 82948 REAGENT STRIP/BLOOD GLUCOSE: CPT

## 2025-01-14 PROCEDURE — 25010000002 HEPARIN (PORCINE) PER 1000 UNITS: Performed by: INTERNAL MEDICINE

## 2025-01-14 PROCEDURE — 25010000002 MAGNESIUM SULFATE IN D5W 1G/100ML (PREMIX) 1-5 GM/100ML-% SOLUTION: Performed by: PHYSICIAN ASSISTANT

## 2025-01-14 PROCEDURE — 99232 SBSQ HOSP IP/OBS MODERATE 35: CPT | Performed by: PSYCHIATRY & NEUROLOGY

## 2025-01-14 PROCEDURE — 80053 COMPREHEN METABOLIC PANEL: CPT | Performed by: INTERNAL MEDICINE

## 2025-01-14 PROCEDURE — 99233 SBSQ HOSP IP/OBS HIGH 50: CPT | Performed by: INTERNAL MEDICINE

## 2025-01-14 PROCEDURE — 84100 ASSAY OF PHOSPHORUS: CPT | Performed by: INTERNAL MEDICINE

## 2025-01-14 PROCEDURE — 94668 MNPJ CHEST WALL SBSQ: CPT

## 2025-01-14 PROCEDURE — 85025 COMPLETE CBC W/AUTO DIFF WBC: CPT | Performed by: INTERNAL MEDICINE

## 2025-01-14 PROCEDURE — 94664 DEMO&/EVAL PT USE INHALER: CPT

## 2025-01-14 PROCEDURE — 94799 UNLISTED PULMONARY SVC/PX: CPT

## 2025-01-14 PROCEDURE — 83735 ASSAY OF MAGNESIUM: CPT | Performed by: INTERNAL MEDICINE

## 2025-01-14 PROCEDURE — 94761 N-INVAS EAR/PLS OXIMETRY MLT: CPT

## 2025-01-14 PROCEDURE — 25010000002 ACETAMINOPHEN 10 MG/ML SOLUTION: Performed by: INTERNAL MEDICINE

## 2025-01-14 RX ORDER — DEXTROSE MONOHYDRATE 100 MG/ML
50 INJECTION, SOLUTION INTRAVENOUS CONTINUOUS
Status: ACTIVE | OUTPATIENT
Start: 2025-01-14 | End: 2025-01-15

## 2025-01-14 RX ORDER — MIDODRINE HYDROCHLORIDE 5 MG/1
5 TABLET ORAL DAILY PRN
Status: DISCONTINUED | OUTPATIENT
Start: 2025-01-14 | End: 2025-01-16

## 2025-01-14 RX ORDER — LIDOCAINE 4 G/G
1 PATCH TOPICAL DAILY PRN
Status: DISCONTINUED | OUTPATIENT
Start: 2025-01-14 | End: 2025-01-25 | Stop reason: HOSPADM

## 2025-01-14 RX ORDER — ACETAMINOPHEN 10 MG/ML
500 INJECTION, SOLUTION INTRAVENOUS EVERY 6 HOURS PRN
Status: DISCONTINUED | OUTPATIENT
Start: 2025-01-14 | End: 2025-01-18

## 2025-01-14 RX ORDER — MAGNESIUM SULFATE 1 G/100ML
1 INJECTION INTRAVENOUS ONCE
Status: COMPLETED | OUTPATIENT
Start: 2025-01-14 | End: 2025-01-14

## 2025-01-14 RX ORDER — SEVELAMER CARBONATE FOR ORAL SUSPENSION 800 MG/1
800 POWDER, FOR SUSPENSION ORAL
Status: DISCONTINUED | OUTPATIENT
Start: 2025-01-15 | End: 2025-01-22

## 2025-01-14 RX ADMIN — IPRATROPIUM BROMIDE AND ALBUTEROL SULFATE 3 ML: 2.5; .5 SOLUTION RESPIRATORY (INHALATION) at 20:28

## 2025-01-14 RX ADMIN — BUDESONIDE 0.5 MG: 0.5 INHALANT ORAL at 06:11

## 2025-01-14 RX ADMIN — Medication 10 ML: at 08:23

## 2025-01-14 RX ADMIN — ARFORMOTEROL TARTRATE 15 MCG: 15 SOLUTION RESPIRATORY (INHALATION) at 20:28

## 2025-01-14 RX ADMIN — LIDOCAINE 1 PATCH: 4 PATCH TOPICAL at 10:54

## 2025-01-14 RX ADMIN — WHITE PETROLATUM 57.7 %-MINERAL OIL 31.9 % EYE OINTMENT: at 06:04

## 2025-01-14 RX ADMIN — HEPARIN SODIUM 5000 UNITS: 5000 INJECTION INTRAVENOUS; SUBCUTANEOUS at 20:09

## 2025-01-14 RX ADMIN — ACETAMINOPHEN 500 MG: 10 INJECTION, SOLUTION INTRAVENOUS at 10:50

## 2025-01-14 RX ADMIN — ACETAMINOPHEN 500 MG: 10 INJECTION, SOLUTION INTRAVENOUS at 17:54

## 2025-01-14 RX ADMIN — ASPIRIN 300 MG: 300 SUPPOSITORY RECTAL at 10:50

## 2025-01-14 RX ADMIN — BUDESONIDE 0.5 MG: 0.5 INHALANT ORAL at 20:29

## 2025-01-14 RX ADMIN — IPRATROPIUM BROMIDE AND ALBUTEROL SULFATE 3 ML: 2.5; .5 SOLUTION RESPIRATORY (INHALATION) at 06:10

## 2025-01-14 RX ADMIN — MAGNESIUM SULFATE 1 G: 1 INJECTION INTRAVENOUS at 08:22

## 2025-01-14 RX ADMIN — DEXTROSE MONOHYDRATE 50 ML/HR: 100 INJECTION, SOLUTION INTRAVENOUS at 10:51

## 2025-01-14 RX ADMIN — DEXTROSE MONOHYDRATE 25 G: 25 INJECTION, SOLUTION INTRAVENOUS at 02:30

## 2025-01-14 RX ADMIN — HEPARIN SODIUM 5000 UNITS: 5000 INJECTION INTRAVENOUS; SUBCUTANEOUS at 08:22

## 2025-01-14 RX ADMIN — ARFORMOTEROL TARTRATE 15 MCG: 15 SOLUTION RESPIRATORY (INHALATION) at 06:11

## 2025-01-14 NOTE — PLAN OF CARE
Goal Outcome Evaluation:  Plan of Care Reviewed With: patient        Progress: no change  Outcome Evaluation: transferred to 4NT this shift. pt is not verbally responsive at this time, is moaning and groaning but not utilizing words. 2L NC, however pt continually removes cannula from nose.IV fluids remain running at 50mL/hr. vitals hard to obtain as patient tenses up and removes pulse ox. no s/s of pain or discomfort. pt seems restless. family at bedside with call light in reach

## 2025-01-14 NOTE — SIGNIFICANT NOTE
01/14/25 1149   OTHER   Discipline occupational therapist   Rehab Time/Intention   Session Not Performed patient unavailable for evaluation

## 2025-01-14 NOTE — PLAN OF CARE
Goal Outcome Evaluation:              Outcome Evaluation: VSS - no need to start levophed gtt this shift, 2LNC, disorientedx4 and nonverbal. NIH improved from start of shift going from 27 to 25. Weight shifted patient q 2 hours. Eye ointment drops ordered per Emily as requested by daughter. Patient has become a lot more restless since beginning of shift and has began moaning frequently. Blood glucose monitored throughout shift, treating hypoglycemia with D50 x2. 1 incontinent urine episode. MRI planned for today. No complaints at this time.

## 2025-01-14 NOTE — PROGRESS NOTES
LOS: 3 days   Patient Care Team:  Domingo Bravo MD as PCP - General (Internal Medicine)  Josephine Warren APRN as Nurse Practitioner (Pulmonary Disease)  Virginie Lopez APRN as Nurse Practitioner (Pulmonary Disease)    Chief Complaint:  Renal issues    Subjective     Interval History:   Pt transferred to ICU for low bp, levo never started  Eyes open but not answering questions.  Appears comfortable.    [Held by provider] acetaminophen, 1,000 mg, Oral, Q8H  arformoterol, 15 mcg, Nebulization, BID - RT  aspirin, 300 mg, Rectal, Daily  atorvastatin, 40 mg, Oral, Daily  budesonide, 0.5 mg, Nebulization, BID - RT  famotidine, 10 mg, Oral, Daily  heparin (porcine), 5,000 Units, Subcutaneous, Q12H  ipratropium-albuterol, 3 mL, Nebulization, BID - RT  levothyroxine, 175 mcg, Oral, Q AM  QUEtiapine, 37.5 mg, Oral, Nightly  sodium chloride, 10 mL, Intravenous, Q12H  sodium chloride, 10 mL, Intravenous, Q12H      dextrose, 50 mL/hr, Last Rate: 50 mL/hr (01/14/25 1051)        Review of Systems:   Unable  Objective     Vital Signs  Temp:  [97.8 °F (36.6 °C)-99.7 °F (37.6 °C)] 99.7 °F (37.6 °C)  Heart Rate:  [70-84] 77  Resp:  [13-24] 16  BP: ()/(39-89) 138/66    Intake/Output Summary (Last 24 hours) at 1/14/2025 1209  Last data filed at 1/14/2025 0600  Gross per 24 hour   Intake 747 ml   Output 1000 ml   Net -253 ml           Physical Exam:     General Appearance:    Poorly responsive,  in no acute distress   Head:    Normocephalic, without obvious abnormality, atraumatic   Throat:    dry   Neck:   No adenopathy, supple, no JVD   Musc:     No CVA tenderness to palpation   Lungs:     Clear to auscultation,respirations unlabored, no wheezes or rhonchi    Heart:    Regular rate and rhythm , normal S1 and S2, no            murmur, no gallop, no rub   Abdomen:     Normal bowel sounds, no masses, soft non-tender   Extremities:    No lower extremity edema, no cyanosis, Larm fistula   :     No hematuria    Skin:   Dry, No   "rash.                  Results Review:    Results from last 7 days   Lab Units 01/14/25 0318 01/13/25 0311 01/12/25  0734   SODIUM mmol/L 134* 136 130*  130*   POTASSIUM mmol/L 4.2 5.3* 4.6  4.6   CHLORIDE mmol/L 99 100 94*  94*   CO2 mmol/L 24.7 24.6 26.9  27.9   BUN mg/dL 14 29* 21  21   CREATININE mg/dL 3.01* 4.68* 3.81*  3.66*   GLUCOSE mg/dL 153* 77 112*  113*   CALCIUM mg/dL 9.2 9.7 10.2  10.0     Results from last 7 days   Lab Units 01/14/25 0318 01/13/25  0311 01/12/25  1030   WBC 10*3/mm3 6.58 6.81 7.11   HEMOGLOBIN g/dL 9.9* 10.9* 10.9*   HEMATOCRIT % 34.6* 37.9 37.4*   PLATELETS 10*3/mm3 187 226 237     Magnesium   Date Value Ref Range Status   01/14/2025 1.8 1.6 - 2.4 mg/dL Final   01/13/2025 2.1 1.6 - 2.4 mg/dL Final   01/12/2025 2.1 1.6 - 2.4 mg/dL Final     Phosphorus   Date Value Ref Range Status   01/14/2025 3.3 2.5 - 4.5 mg/dL Final   01/13/2025 6.3 (H) 2.5 - 4.5 mg/dL Final   01/12/2025 4.4 2.5 - 4.5 mg/dL Final     No results found for: \"BNP\"  Lab Results   Component Value Date    ALBUMIN 3.0 (L) 01/14/2025      Brief Urine Lab Results  (Last result in the past 365 days)        Color   Clarity   Blood   Leuk Est   Nitrite   Protein   CREAT   Urine HCG        10/27/24 1417 Yellow   Cloudy   Negative   Trace   Negative   >=300 mg/dL (3+)                    EEG    Result Date: 1/13/2025  Reason for referral: 79 y.o.male with altered mental status Technical Summary:  A 19 channel digital EEG was performed using the international 10-20 placement system, including eye leads and EKG leads. Duration: 26 minutes Findings: The patient is resting quietly in bed.  The dominant finding on the study is of generalized but frontal dominant triphasic sharp waves which occur in prolonged bursts. They are most often periodic with a discharge frequency of slightly greater than 1 Hz.  There is no evolution in waveform morphology or frequency.  When discharges is not present, diffuse medium amplitude 4-6 Hz " intermixed delta and theta activity are seen symmetrically over both hemispheres.  Photic stimulation does not change the background.  Hyperventilation is not performed.  Stage II sleep is not clearly seen. Video: Available Technical quality: Good SUMMARY: Extremely frequent bursts of generalized but frontal dominant triphasic sharp waves, at her just greater to 1 Hz No lateralizing features are seen Underlying background is moderate generalized slow     Impression: Underlying background suggest diffuse cerebral dysfunction of moderate degree, most commonly seen due to toxic/metabolic cause The triphasic sharp waves are abnormal but nonspecific.  These are most commonly seen due to significant toxic/metabolic disarray, most notably liver or kidney dysfunction.  They may also be seen as a side effect of certain medications such as cefepime.  Less likely, they can also be found on the ictal/interictal spectrum This report is transcribed using the Dragon dictation system.      XR Chest 1 View    Result Date: 1/12/2025  XR CHEST 1 VW-  Date of exam: 1/12/2025, 8:53 P.M.  Indication: Congestion; j96.11-chronic respiratory failure with hypoxia; j18.9-pneumonia, unspecified organism; j96.21-acute and chronic respiratory failure with hypoxia; n18.6-end stage renal disease; j69.0-pneumonitis due to inhalation of food and vomit.  Comparison: 1/1/2025.  FINDINGS: A single AP semi-upright portable view of the chest is provided for review. Bilateral infiltrates are seen. The findings may represent infectious multifocal pneumonia. Pulmonary edema cannot be excluded. Aspiration pneumonia cannot be excluded. Mild-to-moderate cardiomegaly is suspected, as before. There may be small-to-moderate bilateral pleural effusions. External artifacts obscure detail. The thoracic aorta is prominent, tortuous, and calcified.       Impression: Increased bilateral infiltrates are seen. The findings may represent infectious multifocal pneumonia.  Aspiration pneumonia cannot be excluded. Pulmonary edema is possible. A combination of these differential considerations may exist. Please see above comments for further detail.    Portions of this note were completed with a voice recognition program.  Electronically Signed By-Stan Hogan MD On:1/12/2025 11:24 PM            Assessment & Plan       ESRD (end stage renal disease) on dialysis    - ESRD, was on home dialysis prior to admission, has left upper extremity AV fistula for access.  Electrolytes are stable.  Dialysis m/w/f.      - Type 2 diabetes with complications.     - Hypotension blood pressure is acceptable now, on ProAmatine, off pressors,    - Anemia of chronic kidney disease, at goal     - Altered mentation. Worse, neuro called    _R femur FX- ortho seeing      Please call if any questions  [phone]

## 2025-01-14 NOTE — PROGRESS NOTES
[facility]  [facility] Tele[facility]urology Consult Services    Routine Consult Follow-Up    Patient Name:   Nelson Wang  YOB: 1945  Identification Number:   MRN - [id number]  Date of Service:   01/14/2025 13:40:12    Diagnosis        G93.41 - Encephalopathy Metabolic    Impression  79 year old man with altered mental status and confusion most likely secondary to a toxic or metabolic encephalopathy. EEG without epileptiform discharges, triphasic waves consistent with metabolic encephalopathy. Cefepime has been discontinued. Per daughter he is beginning to improve compared to previous days.    - MRI Brain wo Contrast has been discontinued at the request of daughter due to nonfocal exam and concern patient cannot sit still  - Neurology will follow    Our recommendations are outlined below    Nursing Recommendations :  Delirium precautions: Blinds open during the day, closed at night, frequent reorientation, minimize nighttime interruptionsWhen possible avoid benzodiazepines, opioid pain medications, and anticholinergic medications    DVT Prophylaxis :  Choice of Primary Team    Disposition :  Neurology will follow    Subjective  MRI Brain was canceled as patient remains encephalopathic and unlikely to sit still. He is moving all extremities. Discussed with daughter today.    Hospital Course  79 year old man with a history of ESRD on HD, DM, R femur fracture, Dementia, discitis, Afib, restrictive lung disease altered mental status and confusion most likely secondary to a toxic or metabolic encephalopathy.    Imaging  HCT 1/11: No acute abnormality  EEG Triphasic waves, moderate encephalopathy    Labs  A1c 5.6 LDL 69 RVP negative, Cr 4.68 Normal WBC       Examination  BP(112/54), Pulse(71), Temp(99.3), Resp(17),    Neuro Exam:  General: Alert,Awake  Not oriented    Speech: Does not speak or follow commands.    Face: Symmetric:    Extraocular Movements: Intact:  Tracking voice with eyes    Motor  Exam: Spontaneous antigravity movement in the BLE and BUE.           This consult was conducted in real time using interactive audio and video technology. Patient was informed of the technology being used for this visit and agreed to proceed. Patient located in hospital and provider located at home/office setting.    Telehealth Neurology consultation was provided. I spent 10 minutes providing telehealth care. This includes time spent for face to face visit via telemedicine, review of medical records, imaging studies and discussion of findings with providers, the patient and/or family.      Dr Kisha Mary      [facility]  For Inpatient follow-up with [facility] physician please call [facility] at [phone]. As we are not an outpatient service for any post hospital discharge needs please contact the hospital for assistance.  If you have any questions for the [facility] physicians or need to reconsult for clinical or diagnostic changes please contact us via [facility] at [phone]

## 2025-01-14 NOTE — PROGRESS NOTES
LOS: 2 days   Patient Care Team:  Domingo Bravo MD as PCP - General (Internal Medicine)  Josephine Warren APRN as Nurse Practitioner (Pulmonary Disease)  Virginie Lopez APRN as Nurse Practitioner (Pulmonary Disease)    Chief Complaint:  Renal issues    Subjective     Interval History:     Patient Complaints: Chart reviewed.   Seen while receiving dialysis.  Poorly responsive.    [Held by provider] acetaminophen, 1,000 mg, Oral, Q8H  arformoterol, 15 mcg, Nebulization, BID - RT  aspirin, 300 mg, Rectal, Daily  atorvastatin, 40 mg, Oral, Daily  budesonide, 0.5 mg, Nebulization, BID - RT  cefepime, 1,000 mg, Intravenous, Q24H  [START ON 1/14/2025] famotidine, 10 mg, Oral, Daily  heparin (porcine), 5,000 Units, Subcutaneous, Q12H  insulin regular, 2-7 Units, Subcutaneous, Q6H  ipratropium-albuterol, 3 mL, Nebulization, BID - RT  levothyroxine, 175 mcg, Oral, Q AM  QUEtiapine, 37.5 mg, Oral, Nightly  sodium chloride, 10 mL, Intravenous, Q12H  sodium chloride, 10 mL, Intravenous, Q12H      norepinephrine, 0.02-0.3 mcg/kg/min        Review of Systems:   Unable  Objective     Vital Signs  Temp:  [97.7 °F (36.5 °C)-98.4 °F (36.9 °C)] 97.8 °F (36.6 °C)  Heart Rate:  [66-79] 77  Resp:  [11-28] 17  BP: (107-140)/(42-74) 126/74    Intake/Output Summary (Last 24 hours) at 1/13/2025 2029  Last data filed at 1/13/2025 1650  Gross per 24 hour   Intake --   Output 1000 ml   Net -1000 ml           Physical Exam:     General Appearance:    Poorly responsive,  in no acute distress   Head:    Normocephalic, without obvious abnormality, atraumatic   Throat:    dry   Neck:   No adenopathy, supple, no JVD   Musc:     No CVA tenderness to palpation   Lungs:     Clear to auscultation,respirations unlabored, no wheezes or rhonchi    Heart:    Regular rate and rhythm , normal S1 and S2, no            murmur, no gallop, no rub   Abdomen:     Normal bowel sounds, no masses, soft non-tender   Extremities:    No lower extremity edema, no  "cyanosis, Larm fistula   :     No hematuria    Skin:   Dry, No  rash.                  Results Review:    Results from last 7 days   Lab Units 01/13/25  0311 01/12/25  0734 01/11/25  1418   SODIUM mmol/L 136 130*  130* 137   POTASSIUM mmol/L 5.3* 4.6  4.6 4.4   CHLORIDE mmol/L 100 94*  94* 98   CO2 mmol/L 24.6 26.9  27.9 24.2   BUN mg/dL 29* 21  21 50*   CREATININE mg/dL 4.68* 3.81*  3.66* 6.07*   GLUCOSE mg/dL 77 112*  113* 92   CALCIUM mg/dL 9.7 10.2  10.0 10.3     Results from last 7 days   Lab Units 01/13/25  0311 01/12/25  1030 01/12/25  0734   WBC 10*3/mm3 6.81 7.11 7.79   HEMOGLOBIN g/dL 10.9* 10.9* 10.4*   HEMATOCRIT % 37.9 37.4* 35.5*   PLATELETS 10*3/mm3 226 237 209     Magnesium   Date Value Ref Range Status   01/13/2025 2.1 1.6 - 2.4 mg/dL Final   01/12/2025 2.1 1.6 - 2.4 mg/dL Final   01/12/2025 2.1 1.6 - 2.4 mg/dL Final     Phosphorus   Date Value Ref Range Status   01/13/2025 6.3 (H) 2.5 - 4.5 mg/dL Final   01/12/2025 4.4 2.5 - 4.5 mg/dL Final     No results found for: \"BNP\"  Lab Results   Component Value Date    ALBUMIN 3.0 (L) 01/12/2025      Brief Urine Lab Results  (Last result in the past 365 days)        Color   Clarity   Blood   Leuk Est   Nitrite   Protein   CREAT   Urine HCG        10/27/24 1417 Yellow   Cloudy   Negative   Trace   Negative   >=300 mg/dL (3+)                    EEG    Result Date: 1/13/2025  Reason for referral: 79 y.o.male with altered mental status Technical Summary:  A 19 channel digital EEG was performed using the international 10-20 placement system, including eye leads and EKG leads. Duration: 26 minutes Findings: The patient is resting quietly in bed.  The dominant finding on the study is of generalized but frontal dominant triphasic sharp waves which occur in prolonged bursts. They are most often periodic with a discharge frequency of slightly greater than 1 Hz.  There is no evolution in waveform morphology or frequency.  When discharges is not present, " diffuse medium amplitude 4-6 Hz intermixed delta and theta activity are seen symmetrically over both hemispheres.  Photic stimulation does not change the background.  Hyperventilation is not performed.  Stage II sleep is not clearly seen. Video: Available Technical quality: Good SUMMARY: Extremely frequent bursts of generalized but frontal dominant triphasic sharp waves, at her just greater to 1 Hz No lateralizing features are seen Underlying background is moderate generalized slow     Impression: Underlying background suggest diffuse cerebral dysfunction of moderate degree, most commonly seen due to toxic/metabolic cause The triphasic sharp waves are abnormal but nonspecific.  These are most commonly seen due to significant toxic/metabolic disarray, most notably liver or kidney dysfunction.  They may also be seen as a side effect of certain medications such as cefepime.  Less likely, they can also be found on the ictal/interictal spectrum This report is transcribed using the Dragon dictation system.      XR Chest 1 View    Result Date: 1/12/2025  XR CHEST 1 VW-  Date of exam: 1/12/2025, 8:53 P.M.  Indication: Congestion; j96.11-chronic respiratory failure with hypoxia; j18.9-pneumonia, unspecified organism; j96.21-acute and chronic respiratory failure with hypoxia; n18.6-end stage renal disease; j69.0-pneumonitis due to inhalation of food and vomit.  Comparison: 1/1/2025.  FINDINGS: A single AP semi-upright portable view of the chest is provided for review. Bilateral infiltrates are seen. The findings may represent infectious multifocal pneumonia. Pulmonary edema cannot be excluded. Aspiration pneumonia cannot be excluded. Mild-to-moderate cardiomegaly is suspected, as before. There may be small-to-moderate bilateral pleural effusions. External artifacts obscure detail. The thoracic aorta is prominent, tortuous, and calcified.       Impression: Increased bilateral infiltrates are seen. The findings may represent  infectious multifocal pneumonia. Aspiration pneumonia cannot be excluded. Pulmonary edema is possible. A combination of these differential considerations may exist. Please see above comments for further detail.    Portions of this note were completed with a voice recognition program.  Electronically Signed By-Stan Hogan MD On:1/12/2025 11:24 PM      CT Head Without Contrast Stroke Protocol    Result Date: 1/12/2025  CT HEAD WO CONTRAST STROKE PROTOCOL Date of Exam: 1/12/2025 10:31 AM EST Indication: stroke r/o. Comparison: CT head 12/30/2024 Technique: Axial CT images were obtained of the head without contrast administration.  Reconstructed coronal and sagittal images were also obtained. Automated exposure control and iterative construction methods were used. Scan Time: 10:43 a.m. Results discussed with Nathaniel at 10:56 a.m.. Findings: There is no evidence of acute infarction. There there is no acute intracranial hemorrhage. There are no extra-axial collections. Ventricles and CSF spaces are symmetric. No mass effect nor hydrocephalus. There are periventricular white matter changes and cerebral atrophy which appears age-related.  There is fluid opacification of the right mastoid air cells. Paranasal sinuses appear aerated.  Osseous structures and orbits appear intact. There is some soft tissue prominence along the right posterior calvarium which may represent a small hematoma.     Impression: Impression: Stable examination without acute intracranial process. Electronically Signed: Margaret Hammond MD  1/12/2025 10:57 AM EST  Workstation ID: [id number]    CT Pelvis Without Contrast    Result Date: 1/11/2025  CT PELVIS WO CONTRAST-  Date of exam: 1/11/2025, 8:40 P.M.  Indication: Recent fall, continued hip pain, possible fracture of right hip.  Comparison: 12/29/2024.  TECHNIQUE: Axial CT images were obtained of the pelvis without contrast administration. Reconstructed 2D coronal and sagittal images were also  obtained. Automated exposure control and iterative construction methods were used. The study is motion limited.  FINDINGS: Again demonstrated is a right hip prosthesis in place. There is an age-indeterminate, but probably acute to subacute, nondisplaced extra-articular closed periprosthetic fracture involving the proximal right femur including the intertrochanteric region. No other acute fractures are appreciated. No dislocation. The other findings are as described in the prior exam report from 12/29/2024.       Impression: There is a suspected acute-to-subacute nondisplaced periprosthetic fracture involving the proximal right femur, as discussed. No other acute fractures are seen. No dislocation.   Portions of this note were completed with a voice recognition program.  Electronically Signed By-Stan Hogan MD On:1/11/2025 9:15 PM            Assessment & Plan       ESRD (end stage renal disease) on dialysis    - ESRD, was on home dialysis prior to admission, has left upper extremity AV fistula for access.  Electrolytes are stable.  Dialysis today..       - Type 2 diabetes with complications.     - Hypotension blood pressure is acceptable now, on ProAmatine, off pressors,    - Anemia of chronic kidney disease, at goal     - Altered mentation. Worse, neuro called    _R femur FX- ortho seeing     Plan:    Dialysis today.   D/w dgtr. And dialysis staff.     Please call if any questions  [phone]

## 2025-01-14 NOTE — PROGRESS NOTES
Pulmonary / Critical Care Progress Note      Patient Name: Nelson Wang  : 1945  MRN: [id number]  Attending:  Devante Rosado MD   Date of admission: 2025    Subjective   Subjective   Patient critically ill with altered mental status    Over past 24 hours:  Dialyzed yesterday  Weaned off norepinephrine overnight  Still lethargic but more awake this morning.  Began to try to mouth words.  Still not able to take safe p.o.  Leukos now running low 70s to 80s  Multiple electrolyte disturbances          Objective   Objective     Vitals:   Vital signs for last 24 hours:  Temp:  [97.8 °F (36.6 °C)-99.7 °F (37.6 °C)] 99.7 °F (37.6 °C)  Heart Rate:  [70-84] 77  Resp:  [13-24] 16  BP: ()/(39-89) 138/66    Intake/Output last 3 shifts:  I/O last 3 completed shifts:  In: 747 [I.V.:747]  Out: 1000   Intake/Output this shift:  No intake/output data recorded.    Vent settings for last 24 hours:       Hemodynamic parameters for last 24 hours:       Physical Exam:  Vital Signs Reviewed   General:  WDWN, Alert, NAD.    HEENT:  PERRL, EOMI.  OP, nares clear  Chest:  good aeration, diminished with crackles bilaterally, tympanic to percussion bilaterally, no work of breathing noted  CV: RRR, no MGR, pulses 2+, equal.  Abd:  Soft, NT, ND, + BS, no HSM  EXT:  no clubbing, no cyanosis, no edema  Neuro:  A&Ox0, more awake and began to try to mouth words, still somewhat confused, withdrawing to pain, CN grossly intact, no focal deficits.    Skin: No rashes or lesions noted        Result Review    Result Review:  I have personally reviewed the results from the time of this admission to 2025 08:45 EST and agree with these findings:  [x]  Laboratory  [x]  Microbiology  [x]  Radiology  [x]  EKG/Telemetry   [x]  Cardiology/Vascular   []  Pathology  []  Old records  []  Other:  Most notable findings include:       Lab 25  0318 25  0311 25  1030 25  0734 25  1418   WBC 6.58 6.81 7.11 7.79 5.43    HEMOGLOBIN 9.9* 10.9* 10.9* 10.4* 10.7*   HEMATOCRIT 34.6* 37.9 37.4* 35.5* 35.2*   PLATELETS 187 226 237 209 224   SODIUM 134* 136  --  130*  130* 137   POTASSIUM 4.2 5.3*  --  4.6  4.6 4.4   CHLORIDE 99 100  --  94*  94* 98   CO2 24.7 24.6  --  26.9  27.9 24.2   BUN 14 29*  --  21  21 50*   CREATININE 3.01* 4.68*  --  3.81*  3.66* 6.07*   GLUCOSE 153* 77  --  112*  113* 92   CALCIUM 9.2 9.7  --  10.2  10.0 10.3   PHOSPHORUS 3.3 6.3*  --  4.4  --    TOTAL PROTEIN 7.1  --   --  6.8  --    ALBUMIN 3.0*  --   --  3.0*  --    GLOBULIN 4.1  --   --  3.8  --      CT Head Without Contrast Stroke Protocol    Result Date: 1/12/2025  CT HEAD WO CONTRAST STROKE PROTOCOL Date of Exam: 1/12/2025 10:31 AM EST Indication: stroke r/o. Comparison: CT head 12/30/2024 Technique: Axial CT images were obtained of the head without contrast administration.  Reconstructed coronal and sagittal images were also obtained. Automated exposure control and iterative construction methods were used. Scan Time: 10:43 a.m. Results discussed with Nathaniel at 10:56 a.m.. Findings: There is no evidence of acute infarction. There there is no acute intracranial hemorrhage. There are no extra-axial collections. Ventricles and CSF spaces are symmetric. No mass effect nor hydrocephalus. There are periventricular white matter changes and cerebral atrophy which appears age-related.  There is fluid opacification of the right mastoid air cells. Paranasal sinuses appear aerated.  Osseous structures and orbits appear intact. There is some soft tissue prominence along the right posterior calvarium which may represent a small hematoma.     Impression: Impression: Stable examination without acute intracranial process. Electronically Signed: Margaret Hammond MD  1/12/2025 10:57 AM EST  Workstation ID: [id number]    CT Head Without Contrast    Result Date: 12/30/2024  CT HEAD WO CONTRAST Date of Exam: 12/30/2024 10:23 PM EST Indication: altered mental status.  Comparison: CT head without contrast 12/28/2024 Technique: Axial CT images were obtained of the head without contrast administration.  Reconstructed coronal and sagittal images were also obtained. Automated exposure control and iterative construction methods were used. Findings: Image quality slightly degraded due to motion. There is no intracranial hemorrhage. No mass effect or midline shift. Generalized cerebral volume loss unchanged. Moderate white matter findings suggesting chronic microvascular disease. No intraventricular hemorrhage. Posterior fossa without acute abnormality. No abnormal extra-axial fluid collection. The globes are symmetric. There is no retro-orbital abnormality. The mastoid air cells are well-aerated on the left. Small amount of fluid in the right mastoid air cells. No sinus fluid level. Negative for calvarial fracture.     Impression: Impression: 1. No intracranial hemorrhage or acute intracranial abnormality.. 2. Generalized cerebral atrophy with moderate white matter findings of chronic microvascular disease. Electronically Signed: Lasha Ramos MD  12/30/2024 10:38 PM EST  Workstation ID: [id number]    CT Head Without Contrast    Result Date: 12/28/2024  CT CERVICAL SPINE WO CONTRAST-, CT HEAD WO CONTRAST-  (COMBINED REPORT)  Date of exam: 12/28/2024, 10:12 P.M.  Indication: fall; j96.01-acute respiratory failure with hypoxia; j81.0-acute pulmonary edema; r26.2-difficulty in walking, not elsewhere classified; r13.12-dysphagia, oropharyngeal phase  Comparisons: 12/13/2024; 11/2/2024.  TECHNIQUE: Axial CT images were obtained of the head & cervical spine without contrast administration. Reconstructed 2D (two-dimensional) coronal and sagittal images were also obtained. Automated exposure control and iterative construction methods were used. Additionally, the radiation dose reduction device was turned on for each scan per the ALARA (As Low as Reasonably Achievable) protocol. Please see the  electronic medical record, EMR (i.e., Epic), for the documented radiation dose.  EXAM FINDINGS:   HEAD CT: A routine nonenhanced head CT was performed. No acute brain abnormality is seen. No acute infarct. No acute intracranial hemorrhage. No midline shift or acute intracranial mass effect is seen. The ventricular system and the extra-axial spaces are prominent, suggesting central atrophy. There is suspected moderate-to-severe chronic small vessel ischemic disease. Arterial calcifications are seen. No acute skull fracture is identified. The patient has undergone bilateral cataract extractions with intra-ocular lens implants. Age-indeterminate congestive and/or inflammatory changes involve the right mastoid air cell complex, seen previously. Grossly, no significant acute findings are seen within the imaged paranasal sinuses. There is a suspected mild acute scalp contusion along the high right parietal region. The study is motion limited.  CONCLUSION: No acute brain abnormality is seen. No acute intracranial hemorrhage. No acute skull fracture.   CERVICAL SPINE CT: A routine nonenhanced cervical spine CT was performed. Again, sagittal and coronal two-dimensional (2D) reformations are provided for review. The study is motion-limited. Grossly, no acute cervical spine fracture or acute malalignment is identified. Moderate-to-severe degenerative changes are seen at multiple levels with chronic malalignment. 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 Small nonspecific bilateral cervical lymph nodes are seen. There are arterial calcifications. If symptoms or clinical concerns persist, consider imaging follow-up.  CONCLUSION: Grossly, no acute cervical spine fracture is seen. Grossly, no acute subluxation abnormality. The study is motion-limited.    Portions of this note were completed with a voice recognition program.  Electronically Signed By-Stan Hogan MD  On:12/28/2024 11:23 PM       Results for orders placed during the hospital encounter of 01/11/25    Adult Transthoracic Echo Complete w/ Color, Spectral and Contrast if necessary per protocol    Interpretation Summary    Left ventricular ejection fraction appears to be 61 - 65%.    Left ventricular diastolic function is consistent with (grade I) impaired relaxation.    The left atrial cavity is mildly dilated.    Estimated right ventricular systolic pressure from tricuspid regurgitation is moderately elevated (45-55 mmHg).      Procalcitonin and cultures negative  EEG with features consistent with encephalopathy        Assessment & Plan   Assessment / Plan     Active Hospital Problems:  Active Hospital Problems    Diagnosis     **ESRD (end stage renal disease) on dialysis      Impression:  Altered mental status  Toxic/metabolic encephalopathy possible hypoactive delirium  Hypotension  NSTEMI, likely from demand ischemia/type II  End-stage renal disease on hemodialysis  Chronic compensated diastolic congestive heart failure  Possible sepsis  Stroke rule out  Type 2 diabetes, now with hypoglycemia  Discitis status post course of vancomycin  Atrial fibrillation  Small chronic right pleural effusion  Femur fracture     Plan:  Blood pressure better controlled off of pressors  Timing of dialysis per nephrology.  Appreciate assistance  Mental status slowly improving.  EEG consistent with encephalopathy.  N.p.o. for now.  Given his mental status is slowly improving, hopefully he can take p.o. tomorrow.  Appreciate speech therapy input  Will start D10W at 50 mL/h until patient is able to take p.o.  Will need to monitor sodium and glucose closely  Appreciate orthopedic surgery assistance.  Recommended nonoperative management at this time  Troponins did bump quite a bit likely secondary to hypotension and decompensation yesterday in the setting of end-stage renal disease.  Echocardiogram with no obvious wall motion  abnormalities.  Has stable diastolic dysfunction  Discontinue antibiotics given cultures are negative and procalcitonin is normal.  Also discontinuing cefepime will limit any further confounding factors regarding the patient's altered mental status  Limit all sedating medications at this time other than nightly Seroquel  Continue nebulizers and add bronchopulmonary hygiene  EEG consistent with encephalopathy  Appreciate neurology input    Discussed with family at bedside  Okay to transfer out of ICU    VTE Prophylaxis:  Pharmacologic & mechanical VTE prophylaxis orders are present.    CODE STATUS:   Medical Intervention Limits: No intubation (DNI)  Code Status (Patient has no pulse and is not breathing): No CPR (Do Not Attempt to Resuscitate)  Medical Interventions (Patient has pulse or is breathing): Limited Support      The patient is critically ill in the ICU with mental status, NSTEMI, hypoglycemia, end-stage renal disease on dialysis. Multidisciplinary bedside critical care rounds were performed with nursing staff, respiratory therapy, pharmacy, nutritional services, social work. I have personally reviewed the chart, labs and any pertinent imaging available.  I have spent 31 minutes of critical care time, excluding procedures, in the care of this patient.    Electronically signed by Scottie Valentin MD, 01/14/25, 11:44 AM EST.

## 2025-01-14 NOTE — CONSULTS
"Nutrition Services    Patient Name: Nelson Wang  YOB: 1945  MRN: [id number]  Admission date: 1/11/2025      CLINICAL NUTRITION ASSESSMENT      Reason for Assessment  Physician Consult and Tube Feeding Assessment   H&P:  Past Medical History:   Diagnosis Date    Abnormal stress test     Anemia     IRON INFUSIONS WITH DIALYSIS    Anesthesia     WITH HIP REPLACEMENT DAUGHTER BELIEVES HAD SVT 2000    Ankle pain, right     Anxiety and depression     Arthritis     CKD (chronic kidney disease), stage IV     DIALYSIS [id number] SHILEY IN RIGHT CHEST    Diabetes     GERD (gastroesophageal reflux disease)     Gout     High cholesterol     History of peritoneal dialysis     HL (hearing loss)     Hyperkalemia     Hypertension     Hypothyroidism     Insomnia     Night terrors     Psoriasis     Psoriasis     Sleep apnea     Sleep walking     Thrombocytopenia     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    Vitiligo         Current Problems:   Active Hospital Problems    Diagnosis     **ESRD (end stage renal disease) on dialysis         Nutrition/Diet History         Narrative   Pt currently >50 hours NPO, remains too altered for PO intake. Visual NFPE performed as pt is altered and curled up in a ball. No obvious fat/muscle losses noted. Provider requests recommendations for EN, will need to speak with family prior to ordering. RD to provide recs below.       Anthropometrics        Current Height, Weight Height: 162.5 cm (63.98\")      Current BMI Body mass index is 24.39 kg/m².   BMI Classification Normal range   % %   Adjusted Body Weight (ABW) N/A   Weight Hx  Wt Readings from Last 30 Encounters:   01/08/25 0516 64.4 kg (141 lb 15.6 oz)   01/07/25 0500 66.6 kg (146 lb 13.2 oz)   01/06/25 0500 64.3 kg (141 lb 12.1 oz)   01/05/25 0500 65.1 kg (143 lb 8.3 oz)   01/04/25 0521 64 kg (141 lb 1.5 oz)   01/02/25 0417 64.4 kg (141 lb 15.6 oz)   01/01/25 0600 62 kg (136 lb 11 oz)   12/31/24 0600 62.7 kg (138 " lb 3.7 oz)   12/30/24 0917 63.2 kg (139 lb 5.3 oz)   12/29/24 0500 61.8 kg (136 lb 3.9 oz)   12/28/24 0635 63 kg (138 lb 14.2 oz)   12/27/24 0541 63.1 kg (139 lb 1.8 oz)   12/26/24 0738 63.1 kg (139 lb 3.2 oz)   12/25/24 0333 67 kg (147 lb 11.3 oz)   12/24/24 0530 67.9 kg (149 lb 11.1 oz)   12/23/24 0418 68.1 kg (150 lb 2.1 oz)   12/22/24 0525 68.6 kg (151 lb 3.8 oz)   12/21/24 0500 75.4 kg (166 lb 3.6 oz)   12/20/24 0350 67.4 kg (148 lb 9.4 oz)   12/18/24 2009 65.3 kg (143 lb 15.4 oz)   12/18/24 0300 67 kg (147 lb 11.3 oz)   12/17/24 0440 65.8 kg (145 lb 1 oz)   12/16/24 0547 64.3 kg (141 lb 12.1 oz)   12/13/24 0407 62.5 kg (137 lb 12.6 oz)   12/12/24 0500 69.3 kg (152 lb 12.5 oz)   12/09/24 0600 70.2 kg (154 lb 12.2 oz)   12/06/24 1228 70.7 kg (155 lb 13.8 oz)   12/05/24 1850 75.3 kg (166 lb 0.1 oz)   12/01/24 1701 71 kg (156 lb 8.4 oz)   11/27/24 1247 71.6 kg (157 lb 12.8 oz)   11/22/24 1311 71.8 kg (158 lb 3.2 oz)   11/18/24 0351 67.9 kg (149 lb 11.1 oz)   11/17/24 0255 71.9 kg (158 lb 8.2 oz)   11/16/24 0316 67.9 kg (149 lb 11.1 oz)   11/14/24 0611 73.9 kg (162 lb 14.7 oz)   11/13/24 0500 73 kg (161 lb)   11/12/24 0523 73.2 kg (161 lb 6 oz)   11/11/24 1200 72.8 kg (160 lb 7.9 oz)   11/10/24 0500 74.4 kg (164 lb 0.4 oz)   11/08/24 0640 73 kg (160 lb 14.4 oz)   11/07/24 0621 72.7 kg (160 lb 3.2 oz)   11/05/24 0600 68.9 kg (151 lb 14.4 oz)   11/04/24 1402 67 kg (147 lb 11.3 oz)   11/04/24 0410 67.9 kg (149 lb 11.1 oz)   11/01/24 0159 74.3 kg (163 lb 12.8 oz)   10/27/24 0700 72.5 kg (159 lb 13.3 oz)   10/27/24 0346 75.7 kg (166 lb 14.2 oz)   10/09/24 1402 75.7 kg (166 lb 12.8 oz)   10/08/24 1455 75.3 kg (166 lb 0.1 oz)   10/02/24 0812 77.5 kg (170 lb 13.7 oz)   09/28/24 0330 77.2 kg (170 lb 3.1 oz)   09/27/24 1659 76.2 kg (167 lb 15.9 oz)   09/26/24 0823 78.9 kg (174 lb)   09/16/24 1814 79.3 kg (174 lb 13.2 oz)   09/12/24 1344 77.1 kg (169 lb 15.6 oz)   08/30/24 0927 75.6 kg (166 lb 10.7 oz)   08/14/24 0922 76.7 kg  (169 lb)   07/17/24 1116 77.2 kg (170 lb 3.2 oz)   07/16/24 0817 73.8 kg (162 lb 11.2 oz)   07/10/24 0740 73.5 kg (162 lb)   06/17/24 1604 74.6 kg (164 lb 6.4 oz)   06/10/24 1348 80 kg (176 lb 5.9 oz)   06/06/24 1423 72 kg (158 lb 11.2 oz)   05/29/24 0720 72.9 kg (160 lb 11.5 oz)   05/28/24 1015 74.5 kg (164 lb 3.9 oz)   05/24/24 0917 74.6 kg (164 lb 6.4 oz)   05/03/24 1100 73 kg (160 lb 14.4 oz)   05/03/24 0838 74.6 kg (164 lb 6.4 oz)   05/01/24 0847 73.5 kg (162 lb)   04/24/24 1105 73.8 kg (162 lb 9.6 oz)   04/19/24 1300 74.5 kg (164 lb 3.2 oz)   04/14/24 1735 71.7 kg (158 lb 1.1 oz)   04/08/24 2128 78 kg (171 lb 15.3 oz)   03/24/24 0526 78 kg (171 lb 15.3 oz)          Wt Change Observation UTD, fluctuations noted related to fluids. Pt with ESRD on HD. Overall trend down, -18% x 10 mo      Estimated/Assessed Needs  Estimated Needs based on: Ideal Body Weight       Energy Requirements 30-35 kcal/kg    EST Needs (kcal/day) 4157-5234       Protein Requirements 1.2-1.5 g.kg   EST Daily Needs (g/day) 71-89       Fluid Requirements 20-25 ml/kg    Estimated Needs (mL/day) 7042-5965     Labs/Medications         Pertinent Labs Reviewed.   Results from last 7 days   Lab Units 01/14/25  0318 01/13/25  0311 01/12/25  0734   SODIUM mmol/L 134* 136 130*  130*   POTASSIUM mmol/L 4.2 5.3* 4.6  4.6   CHLORIDE mmol/L 99 100 94*  94*   CO2 mmol/L 24.7 24.6 26.9  27.9   BUN mg/dL 14 29* 21  21   CREATININE mg/dL 3.01* 4.68* 3.81*  3.66*   CALCIUM mg/dL 9.2 9.7 10.2  10.0   BILIRUBIN mg/dL 0.6  --  0.6   ALK PHOS U/L 114  --  115   ALT (SGPT) U/L 11  --  11   AST (SGOT) U/L 41*  --  30   GLUCOSE mg/dL 153* 77 112*  113*     Results from last 7 days   Lab Units 01/14/25  0318 01/13/25  0311 01/12/25  1030   MAGNESIUM mg/dL 1.8 2.1 2.1   PHOSPHORUS mg/dL 3.3 6.3*  --    HEMOGLOBIN g/dL 9.9* 10.9* 10.9*   HEMATOCRIT % 34.6* 37.9 37.4*   TRIGLYCERIDES mg/dL  --  136  --      COVID19   Date Value Ref Range Status   01/13/2025 Not  Detected Not Detected - Ref. Range Final     Lab Results   Component Value Date    HGBA1C 5.60 01/13/2025         Pertinent Medications Reviewed.     Malnutrition Severity Assessment      Patient meets criteria for : (S) Moderate (non-severe) Malnutrition  Malnutrition Type (Last 8 Hours)       Malnutrition Severity Assessment       Row Name 01/14/25 1030       Malnutrition Severity Assessment    Malnutrition Type Acute Disease or Injury - Related Malnutrition       Row Name 01/14/25 1030       Insufficient Energy Intake     Insufficient Energy Intake Findings Moderate     Insufficient Energy Intake  <75% of est. energy requirement for >7d)      Row Name 01/14/25 1030       Unintentional Weight Loss     Unintentional Weight Loss Findings Moderate   Some loss fluid, suspect loss is still clinically significant    Unintentional Weight Loss  Weight loss of 7.5% in three months      Row Name 01/14/25 1030       Criteria Met (Must meet criteria for severity in at least 2 of these categories: M Wasting, Fat Loss, Fluid, Secondary Signs, Wt. Status, Intake)    Patient meets criteria for  Moderate (non-severe) Malnutrition                      Nutrition Diagnosis         Nutrition Dx Problem 1 Moderate malnutrition related to Inability to consume sufficient energy as evidenced by unintended weight change., NPO., and repeated admissions decreasing intake, HD/PD increasing needs     Nutrition Intervention           Current Nutrition Orders & Evaluation of Intake       Current PO Diet NPO Diet NPO Type: Strict NPO   Supplement Orders Placed This Encounter      Dietary Nutrition Supplements Novasource Renal (Nepro); café mocha           Nutrition Intervention/Prescription        Advance PO diet as safely able to High Protein, Renal  If consent obtained, begin EN:    NovaSource Renal @ 45mLhr, flush 40mL q2h  Start at 15mL/hr and advance 10mL q8h to goal    Provides: 1980 kcal, 90g pro, 1183mL fluid (703 FW + 480 flush)         Medical Nutrition Therapy/Nutrition Education          Learner     Readiness Patient  Education not indicated at this time     Method     Response N/A  N/A     Monitor/Evaluation        Monitor Per protocol, Pertinent labs, POC/GOC, Diet advancement     Nutrition Discharge Plan         To be determined     Electronically signed by:  Latha Mckeon RD  01/14/25 10:37 EST

## 2025-01-15 LAB
ALBUMIN SERPL-MCNC: 3 G/DL (ref 3.5–5.2)
ALBUMIN/GLOB SERPL: 0.8 G/DL
ALP SERPL-CCNC: 114 U/L (ref 39–117)
ALT SERPL W P-5'-P-CCNC: 10 U/L (ref 1–41)
ANION GAP SERPL CALCULATED.3IONS-SCNC: 12.2 MMOL/L (ref 5–15)
AST SERPL-CCNC: 35 U/L (ref 1–40)
BILIRUB SERPL-MCNC: 0.6 MG/DL (ref 0–1.2)
BUN SERPL-MCNC: 21 MG/DL (ref 8–23)
BUN/CREAT SERPL: 4.6 (ref 7–25)
CALCIUM SPEC-SCNC: 9.7 MG/DL (ref 8.6–10.5)
CHLORIDE SERPL-SCNC: 96 MMOL/L (ref 98–107)
CO2 SERPL-SCNC: 23.8 MMOL/L (ref 22–29)
CREAT SERPL-MCNC: 4.57 MG/DL (ref 0.76–1.27)
DEPRECATED RDW RBC AUTO: 60.4 FL (ref 37–54)
EGFRCR SERPLBLD CKD-EPI 2021: 12.4 ML/MIN/1.73
ERYTHROCYTE [DISTWIDTH] IN BLOOD BY AUTOMATED COUNT: 18.6 % (ref 12.3–15.4)
GLOBULIN UR ELPH-MCNC: 4 GM/DL
GLUCOSE BLDC GLUCOMTR-MCNC: 103 MG/DL (ref 70–99)
GLUCOSE SERPL-MCNC: 119 MG/DL (ref 65–99)
HCT VFR BLD AUTO: 33.2 % (ref 37.5–51)
HGB BLD-MCNC: 9.7 G/DL (ref 13–17.7)
MAGNESIUM SERPL-MCNC: 2.2 MG/DL (ref 1.6–2.4)
MCH RBC QN AUTO: 26.6 PG (ref 26.6–33)
MCHC RBC AUTO-ENTMCNC: 29.2 G/DL (ref 31.5–35.7)
MCV RBC AUTO: 91 FL (ref 79–97)
PHOSPHATE SERPL-MCNC: 4 MG/DL (ref 2.5–4.5)
PLATELET # BLD AUTO: 177 10*3/MM3 (ref 140–450)
PMV BLD AUTO: 9.6 FL (ref 6–12)
POTASSIUM SERPL-SCNC: 4.1 MMOL/L (ref 3.5–5.2)
PROT SERPL-MCNC: 7 G/DL (ref 6–8.5)
RBC # BLD AUTO: 3.65 10*6/MM3 (ref 4.14–5.8)
SODIUM SERPL-SCNC: 132 MMOL/L (ref 136–145)
WBC NRBC COR # BLD AUTO: 5.22 10*3/MM3 (ref 3.4–10.8)

## 2025-01-15 PROCEDURE — 83735 ASSAY OF MAGNESIUM: CPT | Performed by: PHYSICIAN ASSISTANT

## 2025-01-15 PROCEDURE — 80053 COMPREHEN METABOLIC PANEL: CPT | Performed by: PHYSICIAN ASSISTANT

## 2025-01-15 PROCEDURE — 94668 MNPJ CHEST WALL SBSQ: CPT

## 2025-01-15 PROCEDURE — 99232 SBSQ HOSP IP/OBS MODERATE 35: CPT | Performed by: INTERNAL MEDICINE

## 2025-01-15 PROCEDURE — 94799 UNLISTED PULMONARY SVC/PX: CPT

## 2025-01-15 PROCEDURE — 85027 COMPLETE CBC AUTOMATED: CPT | Performed by: PHYSICIAN ASSISTANT

## 2025-01-15 PROCEDURE — 25010000002 ACETAMINOPHEN 10 MG/ML SOLUTION: Performed by: INTERNAL MEDICINE

## 2025-01-15 PROCEDURE — 99231 SBSQ HOSP IP/OBS SF/LOW 25: CPT | Performed by: PSYCHIATRY & NEUROLOGY

## 2025-01-15 PROCEDURE — 94664 DEMO&/EVAL PT USE INHALER: CPT

## 2025-01-15 PROCEDURE — 25010000002 HEPARIN (PORCINE) PER 1000 UNITS: Performed by: INTERNAL MEDICINE

## 2025-01-15 PROCEDURE — 82948 REAGENT STRIP/BLOOD GLUCOSE: CPT

## 2025-01-15 PROCEDURE — 84100 ASSAY OF PHOSPHORUS: CPT | Performed by: PHYSICIAN ASSISTANT

## 2025-01-15 PROCEDURE — 99232 SBSQ HOSP IP/OBS MODERATE 35: CPT | Performed by: STUDENT IN AN ORGANIZED HEALTH CARE EDUCATION/TRAINING PROGRAM

## 2025-01-15 RX ORDER — DEXTROSE MONOHYDRATE 100 MG/ML
50 INJECTION, SOLUTION INTRAVENOUS CONTINUOUS
Status: ACTIVE | OUTPATIENT
Start: 2025-01-15 | End: 2025-01-16

## 2025-01-15 RX ORDER — OFLOXACIN 3 MG/ML
2 SOLUTION/ DROPS OPHTHALMIC 4 TIMES DAILY
Status: DISCONTINUED | OUTPATIENT
Start: 2025-01-15 | End: 2025-01-25 | Stop reason: HOSPADM

## 2025-01-15 RX ORDER — IPRATROPIUM BROMIDE AND ALBUTEROL SULFATE 2.5; .5 MG/3ML; MG/3ML
3 SOLUTION RESPIRATORY (INHALATION) EVERY 4 HOURS PRN
Status: DISCONTINUED | OUTPATIENT
Start: 2025-01-15 | End: 2025-01-21

## 2025-01-15 RX ADMIN — ACETAMINOPHEN 500 MG: 10 INJECTION, SOLUTION INTRAVENOUS at 23:44

## 2025-01-15 RX ADMIN — DEXTROSE MONOHYDRATE 50 ML/HR: 100 INJECTION, SOLUTION INTRAVENOUS at 06:28

## 2025-01-15 RX ADMIN — IPRATROPIUM BROMIDE AND ALBUTEROL SULFATE 3 ML: 2.5; .5 SOLUTION RESPIRATORY (INHALATION) at 08:50

## 2025-01-15 RX ADMIN — BUDESONIDE 0.5 MG: 0.5 INHALANT ORAL at 20:40

## 2025-01-15 RX ADMIN — OFLOXACIN 2 DROP: 3 SOLUTION OPHTHALMIC at 22:22

## 2025-01-15 RX ADMIN — IPRATROPIUM BROMIDE AND ALBUTEROL SULFATE 3 ML: .5; 3 SOLUTION RESPIRATORY (INHALATION) at 04:35

## 2025-01-15 RX ADMIN — BUDESONIDE 0.5 MG: 0.5 INHALANT ORAL at 08:50

## 2025-01-15 RX ADMIN — Medication 10 ML: at 10:07

## 2025-01-15 RX ADMIN — ARFORMOTEROL TARTRATE 15 MCG: 15 SOLUTION RESPIRATORY (INHALATION) at 20:40

## 2025-01-15 RX ADMIN — ACETAMINOPHEN 500 MG: 10 INJECTION, SOLUTION INTRAVENOUS at 15:45

## 2025-01-15 RX ADMIN — HEPARIN SODIUM 5000 UNITS: 5000 INJECTION INTRAVENOUS; SUBCUTANEOUS at 10:07

## 2025-01-15 RX ADMIN — ASPIRIN 300 MG: 300 SUPPOSITORY RECTAL at 10:38

## 2025-01-15 RX ADMIN — DICLOFENAC SODIUM 2 G: 10 GEL TOPICAL at 22:22

## 2025-01-15 RX ADMIN — ARFORMOTEROL TARTRATE 15 MCG: 15 SOLUTION RESPIRATORY (INHALATION) at 08:49

## 2025-01-15 RX ADMIN — IPRATROPIUM BROMIDE AND ALBUTEROL SULFATE 3 ML: 2.5; .5 SOLUTION RESPIRATORY (INHALATION) at 20:40

## 2025-01-15 RX ADMIN — ACETAMINOPHEN 500 MG: 10 INJECTION, SOLUTION INTRAVENOUS at 00:08

## 2025-01-15 RX ADMIN — HEPARIN SODIUM 5000 UNITS: 5000 INJECTION INTRAVENOUS; SUBCUTANEOUS at 22:22

## 2025-01-15 RX ADMIN — ACETAMINOPHEN 500 MG: 10 INJECTION, SOLUTION INTRAVENOUS at 07:52

## 2025-01-15 NOTE — SIGNIFICANT NOTE
01/15/25 1400   Physical Therapy Time and Intention   Session Not Performed other (see comments)   Comment, Session Not Performed Pt family and sitter report pt has been restless all morning and attempting to pull at lines and tubes. Family report pt just fell asleep and will be going down for dialysis soon. Family and sitter recommend PT to hold today. PT to check back when able.

## 2025-01-15 NOTE — PROGRESS NOTES
LOS: 4 days   Patient Care Team:  Domingo Bravo MD as PCP - General (Internal Medicine)  Josephine Warren APRN as Nurse Practitioner (Pulmonary Disease)  Virginie Lopez APRN as Nurse Practitioner (Pulmonary Disease)    Chief Complaint:  Renal issues    Subjective     Interval History:   Patient seen while receiving dialysis.  Having trouble with venous needle.  Moaning.  Moving extremities.  Did not follow commands.    Did not appear to be in distress.    [Held by provider] acetaminophen, 1,000 mg, Oral, Q8H  arformoterol, 15 mcg, Nebulization, BID - RT  aspirin, 300 mg, Rectal, Daily  atorvastatin, 40 mg, Oral, Daily  budesonide, 0.5 mg, Nebulization, BID - RT  famotidine, 10 mg, Oral, Daily  heparin (porcine), 5,000 Units, Subcutaneous, Q12H  ipratropium-albuterol, 3 mL, Nebulization, BID - RT  levothyroxine, 175 mcg, Oral, Q AM  QUEtiapine, 37.5 mg, Oral, Nightly  [Held by provider] sevelamer, 800 mg, Oral, TID With Meals  sodium chloride, 10 mL, Intravenous, Q12H  sodium chloride, 10 mL, Intravenous, Q12H      dextrose, 50 mL/hr, Last Rate: 50 mL/hr (01/15/25 1042)        Review of Systems:   Unable  Objective     Vital Signs  Temp:  [97.9 °F (36.6 °C)-98.6 °F (37 °C)] 98.1 °F (36.7 °C)  Heart Rate:  [63-73] 73  Resp:  [12-20] 18  BP: (117-130)/(41-63) 130/46  No intake or output data in the 24 hours ending 01/15/25 4585          Physical Exam:     General Appearance:    Poorly responsive,  in no acute distress, moaning.   Head:    Normocephalic, without obvious abnormality, atraumatic   Throat:    dry   Neck:   No adenopathy, supple, no JVD       Lungs:     Clear to auscultation,respirations unlabored, no wheezes or rhonchi    Heart:    Regular rate and rhythm , normal S1 and S2, no            murmur, no gallop, no rub   Abdomen:     Normal bowel sounds, no masses, soft non-tender   Extremities:    No lower extremity edema, no cyanosis, L arm fistula       Skin:   Dry, No rash.  Skin discoloration from  "psoriasis noted.                  Results Review:    Results from last 7 days   Lab Units 01/15/25  0433 01/14/25 0318 01/13/25 0311   SODIUM mmol/L 132* 134* 136   POTASSIUM mmol/L 4.1 4.2 5.3*   CHLORIDE mmol/L 96* 99 100   CO2 mmol/L 23.8 24.7 24.6   BUN mg/dL 21 14 29*   CREATININE mg/dL 4.57* 3.01* 4.68*   GLUCOSE mg/dL 119* 153* 77   CALCIUM mg/dL 9.7 9.2 9.7     Results from last 7 days   Lab Units 01/15/25  0433 01/14/25 0318 01/13/25 0311   WBC 10*3/mm3 5.22 6.58 6.81   HEMOGLOBIN g/dL 9.7* 9.9* 10.9*   HEMATOCRIT % 33.2* 34.6* 37.9   PLATELETS 10*3/mm3 177 187 226     Magnesium   Date Value Ref Range Status   01/15/2025 2.2 1.6 - 2.4 mg/dL Final   01/14/2025 1.8 1.6 - 2.4 mg/dL Final   01/13/2025 2.1 1.6 - 2.4 mg/dL Final     Phosphorus   Date Value Ref Range Status   01/15/2025 4.0 2.5 - 4.5 mg/dL Final   01/14/2025 3.3 2.5 - 4.5 mg/dL Final   01/13/2025 6.3 (H) 2.5 - 4.5 mg/dL Final     No results found for: \"BNP\"  Lab Results   Component Value Date    ALBUMIN 3.0 (L) 01/15/2025      Brief Urine Lab Results  (Last result in the past 365 days)        Color   Clarity   Blood   Leuk Est   Nitrite   Protein   CREAT   Urine HCG        10/27/24 1417 Yellow   Cloudy   Negative   Trace   Negative   >=300 mg/dL (3+)                    No radiology results from the last 24 hrs         Assessment & Plan       ESRD (end stage renal disease) on dialysis    Moderate protein-calorie malnutrition    - ESRD, was on home dialysis prior to admission, has left upper extremity AV fistula for access.  Electrolytes are stable.  Dialysis m/w/f.  Discussed with dialysis staff.      - Type 2 diabetes with complications.     - Hypotension blood pressure is acceptable now, on ProAmatine, off pressors,    - Anemia of chronic kidney disease, close to goal     - Altered mentation.  Delirium, may be slightly better than 2 days ago.    _R femur FX- ortho seeing    Discussed with sitter, dialysis staff and his daughter.  Will " follow.      Please call if any questions  [phone]

## 2025-01-15 NOTE — PROGRESS NOTES
[facility]  [facility] Tele[facility]urology Consult Services    Routine Consult Follow-Up    Patient Name:   Nelson Wang  YOB: 1945  Identification Number:   MRN - [id number]  Date of Service:   01/15/2025 13:44:57    Diagnosis        G93.41 - Encephalopathy Metabolic    Impression  79 year old man with altered mental status and confusion most likely secondary to a toxic or metabolic encephalopathy. EEG without epileptiform discharges, triphasic waves consistent with metabolic encephalopathy. Cefepime has been discontinued. His exam is stable compared to prior. He remains minimally responsive. MRI has been declined by family due to concern for need of sedation, in light of this, neurologically we are really only monitoring at this point for improvement with other medical interventions including cefepime washout and dialysis. Neurology will sign off at this time as we are not currently providing any active management and repeated neurologic exams seem to be causing more discomfort than helping. Recommend continuing supportive and medical care, frequent reorientation, and pain control with limiting sedating medications as able. We will remain available for further questions as needed, please reconsult if we may be of assistance.    Our recommendations are outlined below    Nursing Recommendations :  Delirium precautions: Blinds open during the day, closed at night, frequent reorientation, minimize nighttime interruptionsWhen possible avoid benzodiazepines, opioid pain medications, and anticholinergic medications    DVT Prophylaxis :  Choice of Primary Team    Disposition :  Neurology will sign off. Reconsult if Needed.    Subjective  Not responsive. Briefly opens eyes to voice/tactile stim. Briefly tracks examiner.    Hospital Course  79 year old man with a history of ESRD on HD, DM, R femur fracture, Dementia, discitis, Afib not on AC, restrictive lung disease altered mental status and  confusion most likely secondary to a toxic or metabolic encephalopathy.    Imaging  HCT 1/11: No acute abnormality  EEG Triphasic waves, moderate encephalopathy    Labs  A1c 5.6 LDL 69 RVP negative, Cr 4.68 Normal WBC       Examination  BP(130/46), Pulse(73), Temp(98.1), Resp(18),    Neuro Exam:  General: Alert,Awake  Not oriented    Speech: Does not speak or follow commands.    Face: Symmetric:    Extraocular Movements: Intact:  Tracking voice with eyes    Motor Exam: Spontaneous antigravity movement in the BUE. Does not move BLE.           This consult was conducted in real time using interactive audio and video technology. Patient was informed of the technology being used for this visit and agreed to proceed. Patient located in hospital and provider located at home/office setting.    Telehealth Neurology consultation was provided. I spent 10 minutes providing telehealth care. This includes time spent for face to face visit via telemedicine, review of medical records, imaging studies and discussion of findings with providers, the patient and/or family.      Dr Kisha Mary      [facility]  For Inpatient follow-up with [facility] physician please call [facility] at [phone]. As we are not an outpatient service for any post hospital discharge needs please contact the hospital for assistance.  If you have any questions for the [facility] physicians or need to reconsult for clinical or diagnostic changes please contact us via [facility] at [phone]         none

## 2025-01-15 NOTE — SIGNIFICANT NOTE
01/15/25 1259   OTHER   Discipline speech language pathologist   Rehab Time/Intention   Session Not Performed patient unavailable for evaluation   Recommendations   SLP - Next Appointment 01/16/25

## 2025-01-15 NOTE — PLAN OF CARE
Goal Outcome Evaluation:              Outcome Evaluation: patient slept most of the shift, noted to be moaning and groaning on and off, tylenol given for pain with minimum relieve, reached out to physician and concern with sedation with narcotics. Gave asa suppository with noted relief. Currently down in dialysis, Safety watch with patient at all times.

## 2025-01-15 NOTE — PROGRESS NOTES
[facility]   Hospitalist Progress Note    Date of admission: 1/11/2025  Patient Name: Nelson Wang  1945  Date: 1/14/2025      Subjective     No chief complaint on file.      Interval Followup: Remains confused but little more awake and interactive at times.  Not able to answer questions for me.    d/w family at bedside      Objective     Vitals:   Temp:  [98.2 °F (36.8 °C)-99.7 °F (37.6 °C)] 98.7 °F (37.1 °C)  Heart Rate:  [67-84] 80  Resp:  [13-21] 16  BP: ()/() 126/68  Flow (L/min) (Oxygen Therapy):  [2] 2    Physical Exam  Thin frail in bed resting on side, wakes to voice and touch not able to answer to me  Rhonchi otherwise CTAB somewhat poor effort, oxygen is out of his nose currently and he is 90% when we are able to get a good [place]  RRR no lower extremity pitting edema    Result Review:  Vital signs, labs and recent relevant imaging reviewed.      CBC          1/12/2025    07:34 1/12/2025    10:30 1/13/2025    03:11 1/14/2025    03:18   CBC   WBC 7.79  7.11  6.81  6.58    RBC 3.85  4.06  4.05  3.71    Hemoglobin 10.4  10.9  10.9  9.9    Hematocrit 35.5  37.4  37.9  34.6    MCV 92.2  92.1  93.6  93.3    MCH 27.0  26.8  26.9  26.7    MCHC 29.3  29.1  28.8  28.6    RDW 19.2  19.2  19.6  18.6    Platelets 209  237  226  187      CMP          1/12/2025    07:34 1/13/2025    03:11 1/14/2025    03:18   CMP   Glucose 113     112  77  153    BUN 21     21  29  14    Creatinine 3.66     3.81  4.68  3.01    EGFR 16.2     15.5  12.1  20.5    Sodium 130     130  136  134    Potassium 4.6     4.6  5.3  4.2    Chloride 94     94  100  99    Calcium 10.0     10.2  9.7  9.2    Total Protein 6.8   7.1    Albumin 3.0   3.0    Globulin 3.8   4.1    Total Bilirubin 0.6   0.6    Alkaline Phosphatase 115   114    AST (SGOT) 30   41    ALT (SGPT) 11   11    Albumin/Globulin Ratio 0.8   0.7    BUN/Creatinine Ratio 5.7     5.5  6.2  4.7    Anion Gap 8.1     9.1  11.4  10.3          acetaminophen    albumin  human    artificial tears    senna-docusate sodium **AND** polyethylene glycol **AND** bisacodyl **AND** bisacodyl    calcium carbonate    dextrose    dextrose    Diclofenac Sodium    glucagon (human recombinant)    Lidocaine    ondansetron ODT    polyethyl glycol-propyl glycol    QUEtiapine    sodium chloride    sodium chloride    sodium chloride    sodium chloride    [Held by provider] acetaminophen, 1,000 mg, Oral, Q8H  arformoterol, 15 mcg, Nebulization, BID - RT  aspirin, 300 mg, Rectal, Daily  atorvastatin, 40 mg, Oral, Daily  budesonide, 0.5 mg, Nebulization, BID - RT  famotidine, 10 mg, Oral, Daily  heparin (porcine), 5,000 Units, Subcutaneous, Q12H  ipratropium-albuterol, 3 mL, Nebulization, BID - RT  levothyroxine, 175 mcg, Oral, Q AM  QUEtiapine, 37.5 mg, Oral, Nightly  sodium chloride, 10 mL, Intravenous, Q12H  sodium chloride, 10 mL, Intravenous, Q12H        EEG    Result Date: 1/13/2025  Underlying background suggest diffuse cerebral dysfunction of moderate degree, most commonly seen due to toxic/metabolic cause The triphasic sharp waves are abnormal but nonspecific.  These are most commonly seen due to significant toxic/metabolic disarray, most notably liver or kidney dysfunction.  They may also be seen as a side effect of certain medications such as cefepime.  Less likely, they can also be found on the ictal/interictal spectrum This report is transcribed using the Dragon dictation system.      XR Chest 1 View    Result Date: 1/12/2025  Increased bilateral infiltrates are seen. The findings may represent infectious multifocal pneumonia. Aspiration pneumonia cannot be excluded. Pulmonary edema is possible. A combination of these differential considerations may exist. Please see above comments for further detail.    Portions of this note were completed with a voice recognition program.  Electronically Signed By-Stan Hogan MD On:1/12/2025 11:24 PM      CT Head Without Contrast Stroke  Protocol    Result Date: 1/12/2025  Impression: Stable examination without acute intracranial process. Electronically Signed: Margaret Hammond MD  1/12/2025 10:57 AM EST  Workstation ID: [id number]    CT Pelvis Without Contrast    Result Date: 1/11/2025  There is a suspected acute-to-subacute nondisplaced periprosthetic fracture involving the proximal right femur, as discussed. No other acute fractures are seen. No dislocation.   Portions of this note were completed with a voice recognition program.  Electronically Signed By-Stan Hogan MD On:1/11/2025 9:15 PM       Assessment / Plan     Summary: 79 y.o. ESRD on dialysis, type 2 diabetes, dementia, discitis having completed vancomycin, A-fib, restrictive lung disease.  Patient recently discharged following long hospitalization, admitted from 12/5/2024 through 1/8/2025.  Patient discharged home with in-home dialysis.  Unfortunately today patient's dialysis machine not working.  Patient's primary nephrologist was contacted, unable to get patient into other outpatient dialysis facilities.  Therefore, decision was made to direct admit patient into the hospital for dialysis as he has not had dialysis for 3 days.  Hospitalist service contacted for admission orders, nephrology consulted for dialysis needs.      RRT called morning of 12th.  Patient was found to have fixed gaze to the right.  Patient only responding to painful stimuli minimally.  Stat CT obtained showing stable examination without acute intracranial process.  Teleneurology consulted during stroke RRT.  Felt altered mental status and confusion most likely secondary to toxic or metabolic etiology.  During stroke RRT patient noted to be hypotensive, while fluid bolus running, daughter endorses improvement in physical exam.  Patient was transferred to a telemetry bed, blood pressures again started drifting down therefore felt safest to transfer patient to the ICU for pressors if needed.     Patient's CT scan of  pelvis does reveal right periprosthetic proximal femur fracture.  Orthopedic surgery consulted and able to talk with patient and daughter at bedside.  At this time plan is for conservative measures, continue nonoperative treatment with toe-touch as able, continue to work with PT and OT and follow-up in clinic clos    Assessment/Plan (clinically significant if listed here)  Encephalopathy  End-stage renal disease on dialysis  Hypotension  Type 2 diabetes  Acute to subacute nondisplaced periprosthetic fracture involving proximal right femur  Dementia  Discitis, completed course of vancomycin  Atrial fibrillation not on anticoagulation due to falls  Restrictive lung disease    echo  ·  Left ventricular ejection fraction appears to be 61 - 65%.  ·  Left ventricular diastolic function is consistent with (grade I) impaired relaxation.  ·  The left atrial cavity is mildly dilated.  ·  Estimated right ventricular systolic pressure from tricuspid regurgitation is moderately elevated (45-55 mmHg).     EEG - IMPRESSION:   Underlying background suggest diffuse cerebral dysfunction of moderate degree, most commonly seen due to toxic/metabolic cause   The triphasic sharp waves are abnormal but nonspecific.  These are most commonly seen due to significant toxic/metabolic disarray, most notably liver or kidney dysfunction.  They may also be seen as a side effect of certain medications such as cefepime.  Less likely, they can also be found on the ictal/interictal spectrum      Mentation starting to improve, but not be able to get adequate MRI given stability issues, ct head from 1/12 was negative  per discussion with daughter/family discontinuing, suspect greater component of possible delirium contributing at this time will transfer out of the ICU to private room in the [facility] to hopefully allow for improved mentation.  Stop extra monitor/pulse ox monitoring and try to redirect as able  Nonoperative management of fracture per Ortho,  monitor periodically with imaging outpatient, appreciate assistance - wbat, toe-touch as tolerated   IV  ofirmev prn for now until able to take po, avoiding opiates/sedating meds as able, try lidocaine patch, nonnarcotic options as able   Dc iv cefepime, cultures negative, may possibly contribute to eeg findings as well/mentation, procal neg  Slp evaluation, diet as able/safe   Appreciate neuro assistance, rec's reviewed   Cont HD as per nephrology, appreciate assistance, monitor bp closely,   Midodrine on HD days prior to HD as able  Echo reaasonable, troponin change suspect in setting of esrd/hypotension  A1c 5.6, had low glucose, stop ssi, cont d10, monitor electrolytes  Cont po meds as able  Cont seroquel at night as able  Cont synthroid  Cont statin, aspirin rectal currently  Dnr/dni, cont goc discussions   PT/OT - cont as able   Check a.m. CBC, BMP, magnesium, phosphorus, blood glucose monitoring  Transfer to floor, cont close inpatient monitoring. D/w pulm    Dispo: home with family/caregivers once medically stable.   D/w SW    VTE Prophylaxis:  Pharmacologic & mechanical VTE prophylaxis orders are present.      Medical Intervention Limits: No intubation (DNI)  Code Status (Patient has no pulse and is not breathing): No CPR (Do Not Attempt to Resuscitate)  Medical Interventions (Patient has pulse or is breathing): Limited Support

## 2025-01-15 NOTE — PROGRESS NOTES
Pulmonary / Critical Care Progress Note      Patient Name: Nelson Wang  : 1945  MRN: [id number]  Attending:  Devante Rosado MD   Date of admission: 2025    Subjective   Subjective   Transfer out of ICU follow-up    Confused this morning  Morning to pull his IVs out  Sitter at bedside  On 2 L nasal cannula      Objective   Objective     Vitals:   Vital signs for last 24 hours:  Temp:  [97.9 °F (36.6 °C)-98.7 °F (37.1 °C)] 98.1 °F (36.7 °C)  Heart Rate:  [63-80] 73  Resp:  [12-20] 18  BP: (112-130)/(41-68) 130/46    Intake/Output last 3 shifts:  I/O last 3 completed shifts:  In: 747 [I.V.:747]  Out: -   Intake/Output this shift:  No intake/output data recorded.    Vent settings for last 24 hours:       Hemodynamic parameters for last 24 hours:       Physical Exam:  Vital Signs Reviewed   General:  WDWN, NAD.  Elderly male  HEENT:  PERRL, EOMI.  OP, nares clear  Chest: diminished with crackles bilaterally, tympanic to percussion bilaterally, no work of breathing noted on 2 L nasal cannula  CV: RRR, no MGR, pulses 2+, equal.  Abd:  Soft, NT, ND, + BS, no HSM  EXT:  no clubbing, no cyanosis, no edema  Neuro:  A&Ox0, CN grossly intact, no focal deficits.    Skin: No rashes or lesions noted        Result Review    Result Review:  I have personally reviewed the results from the time of this admission to 1/15/2025 13:36 EST and agree with these findings:  [x]  Laboratory  [x]  Microbiology  [x]  Radiology  [x]  EKG/Telemetry   [x]  Cardiology/Vascular   []  Pathology  []  Old records  []  Other:  Most notable findings include:       Lab 01/15/25  0433 25  0318 25  0311 25  1030 25  0734 25  1418   WBC 5.22 6.58 6.81 7.11 7.79 5.43   HEMOGLOBIN 9.7* 9.9* 10.9* 10.9* 10.4* 10.7*   HEMATOCRIT 33.2* 34.6* 37.9 37.4* 35.5* 35.2*   PLATELETS 177 187 226 237 209 224   SODIUM 132* 134* 136  --  130*  130* 137   POTASSIUM 4.1 4.2 5.3*  --  4.6  4.6 4.4   CHLORIDE 96* 99 100  --  94*   94* 98   CO2 23.8 24.7 24.6  --  26.9  27.9 24.2   BUN 21 14 29*  --  21  21 50*   CREATININE 4.57* 3.01* 4.68*  --  3.81*  3.66* 6.07*   GLUCOSE 119* 153* 77  --  112*  113* 92   CALCIUM 9.7 9.2 9.7  --  10.2  10.0 10.3   PHOSPHORUS 4.0 3.3 6.3*  --  4.4  --    TOTAL PROTEIN 7.0 7.1  --   --  6.8  --    ALBUMIN 3.0* 3.0*  --   --  3.0*  --    GLOBULIN 4.0 4.1  --   --  3.8  --      CT Head Without Contrast Stroke Protocol    Result Date: 1/12/2025  CT HEAD WO CONTRAST STROKE PROTOCOL Date of Exam: 1/12/2025 10:31 AM EST Indication: stroke r/o. Comparison: CT head 12/30/2024 Technique: Axial CT images were obtained of the head without contrast administration.  Reconstructed coronal and sagittal images were also obtained. Automated exposure control and iterative construction methods were used. Scan Time: 10:43 a.m. Results discussed with Nathaniel at 10:56 a.m.. Findings: There is no evidence of acute infarction. There there is no acute intracranial hemorrhage. There are no extra-axial collections. Ventricles and CSF spaces are symmetric. No mass effect nor hydrocephalus. There are periventricular white matter changes and cerebral atrophy which appears age-related.  There is fluid opacification of the right mastoid air cells. Paranasal sinuses appear aerated.  Osseous structures and orbits appear intact. There is some soft tissue prominence along the right posterior calvarium which may represent a small hematoma.     Impression: Impression: Stable examination without acute intracranial process. Electronically Signed: Margaret Hammond MD  1/12/2025 10:57 AM EST  Workstation ID: [id number]    CT Head Without Contrast    Result Date: 12/30/2024  CT HEAD WO CONTRAST Date of Exam: 12/30/2024 10:23 PM EST Indication: altered mental status. Comparison: CT head without contrast 12/28/2024 Technique: Axial CT images were obtained of the head without contrast administration.  Reconstructed coronal and sagittal images were also  obtained. Automated exposure control and iterative construction methods were used. Findings: Image quality slightly degraded due to motion. There is no intracranial hemorrhage. No mass effect or midline shift. Generalized cerebral volume loss unchanged. Moderate white matter findings suggesting chronic microvascular disease. No intraventricular hemorrhage. Posterior fossa without acute abnormality. No abnormal extra-axial fluid collection. The globes are symmetric. There is no retro-orbital abnormality. The mastoid air cells are well-aerated on the left. Small amount of fluid in the right mastoid air cells. No sinus fluid level. Negative for calvarial fracture.     Impression: Impression: 1. No intracranial hemorrhage or acute intracranial abnormality.. 2. Generalized cerebral atrophy with moderate white matter findings of chronic microvascular disease. Electronically Signed: Lasha Ramos MD  12/30/2024 10:38 PM EST  Workstation ID: [id number]    CT Head Without Contrast    Result Date: 12/28/2024  CT CERVICAL SPINE WO CONTRAST-, CT HEAD WO CONTRAST-  (COMBINED REPORT)  Date of exam: 12/28/2024, 10:12 P.M.  Indication: fall; j96.01-acute respiratory failure with hypoxia; j81.0-acute pulmonary edema; r26.2-difficulty in walking, not elsewhere classified; r13.12-dysphagia, oropharyngeal phase  Comparisons: 12/13/2024; 11/2/2024.  TECHNIQUE: Axial CT images were obtained of the head & cervical spine without contrast administration. Reconstructed 2D (two-dimensional) coronal and sagittal images were also obtained. Automated exposure control and iterative construction methods were used. Additionally, the radiation dose reduction device was turned on for each scan per the ALARA (As Low as Reasonably Achievable) protocol. Please see the electronic medical record, EMR (i.e., Epic), for the documented radiation dose.  EXAM FINDINGS:   HEAD CT: A routine nonenhanced head CT was performed. No acute brain abnormality is seen. No  acute infarct. No acute intracranial hemorrhage. No midline shift or acute intracranial mass effect is seen. The ventricular system and the extra-axial spaces are prominent, suggesting central atrophy. There is suspected moderate-to-severe chronic small vessel ischemic disease. Arterial calcifications are seen. No acute skull fracture is identified. The patient has undergone bilateral cataract extractions with intra-ocular lens implants. Age-indeterminate congestive and/or inflammatory changes involve the right mastoid air cell complex, seen previously. Grossly, no significant acute findings are seen within the imaged paranasal sinuses. There is a suspected mild acute scalp contusion along the high right parietal region. The study is motion limited.  CONCLUSION: No acute brain abnormality is seen. No acute intracranial hemorrhage. No acute skull fracture.   CERVICAL SPINE CT: A routine nonenhanced cervical spine CT was performed. Again, sagittal and coronal two-dimensional (2D) reformations are provided for review. The study is motion-limited. Grossly, no acute cervical spine fracture or acute malalignment is identified. Moderate-to-severe degenerative changes are seen at multiple levels with chronic malalignment. 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 Small nonspecific bilateral cervical lymph nodes are seen. There are arterial calcifications. If symptoms or clinical concerns persist, consider imaging follow-up.  CONCLUSION: Grossly, no acute cervical spine fracture is seen. Grossly, no acute subluxation abnormality. The study is motion-limited.    Portions of this note were completed with a voice recognition program.  Electronically Signed By-Stan Hogan MD On:12/28/2024 11:23 PM       Results for orders placed during the hospital encounter of 01/11/25    Adult Transthoracic Echo Complete w/ Color, Spectral and Contrast if necessary per  protocol    Interpretation Summary    Left ventricular ejection fraction appears to be 61 - 65%.    Left ventricular diastolic function is consistent with (grade I) impaired relaxation.    The left atrial cavity is mildly dilated.    Estimated right ventricular systolic pressure from tricuspid regurgitation is moderately elevated (45-55 mmHg).      Procalcitonin and cultures negative  EEG with features consistent with encephalopathy        Assessment & Plan   Assessment / Plan     Active Hospital Problems:  Active Hospital Problems    Diagnosis     **ESRD (end stage renal disease) on dialysis     Moderate protein-calorie malnutrition      Impression:  Altered mental status  Toxic/metabolic encephalopathy possible hypoactive delirium  Hypotension  NSTEMI, likely from demand ischemia/type II  End-stage renal disease on hemodialysis  Chronic compensated diastolic congestive heart failure  Possible sepsis  Stroke rule out  Type 2 diabetes, now with hypoglycemia  Discitis status post course of vancomycin  Atrial fibrillation  Small chronic right pleural effusion  Femur fracture     Plan:  Remains on 2 L nasal cannula.  Keep SpO2 greater than 90%.  Timing of dialysis per nephrology.  Appreciate assistance  Still confused this morning.   EEG consistent with encephalopathy.  Speech therapy input when mentation improves  Appreciate orthopedic surgery assistance.  Recommended nonoperative management at this time  Echocardiogram with no obvious wall motion abnormalities.  Has stable diastolic dysfunction  Discontinue antibiotics given cultures are negative and procalcitonin is normal.  Also discontinuing cefepime will limit any further confounding factors regarding the patient's altered mental status  Limit all sedating medications at this time other than nightly Seroquel  Continue nebulizers and add bronchopulmonary hygiene    VTE Prophylaxis:  Pharmacologic & mechanical VTE prophylaxis orders are present.    CODE STATUS:    Medical Intervention Limits: No intubation (DNI)  Code Status (Patient has no pulse and is not breathing): No CPR (Do Not Attempt to Resuscitate)  Medical Interventions (Patient has pulse or is breathing): Limited Support    Electronically signed by Sandra Espinal MD, 01/15/25, 1:40 PM EST.

## 2025-01-15 NOTE — PLAN OF CARE
Goal Outcome Evaluation:                 Patient disoriented x 4. Safety sitter at bedside, medicated per MAR, new iv this shift, see LDA on flowsheet. Currently laying in bed on 2L nasal cannula with safety sitter at bedside, refusing turns due to right femur fracture and pain, NPO, call bell in reach of safety sitter and D10 infusing to #20 in Left forearm.

## 2025-01-15 NOTE — PROGRESS NOTES
[facility]   Hospitalist Progress Note    Date of admission: 1/11/2025  Patient Name: Nelson Wang  1945  Date: 1/15/2025      Subjective     No chief complaint on file.      Interval Followup: Mentation remains poor, still confused, unable to safely take p.o.  Discussed with daughter/family at bedside.  We did refer core track at this time hopefully get more time to show improvement expected to have more agitation with attempted core track currently.      Objective     Vitals:   Temp:  [97.9 °F (36.6 °C)-98.6 °F (37 °C)] 98.1 °F (36.7 °C)  Heart Rate:  [63-73] 73  Resp:  [12-20] 18  BP: (117-130)/(41-63) 130/46  Flow (L/min) (Oxygen Therapy):  [2] 2    Physical Exam  Thin frail in bed resting on side, unable to answer questions appropriately, does wake to family voice, has some intermittent cough in response to encouragement for increased respirations per family   Currently on room air with no acute respiratory distress   No lower extremity pitting edema  Moving extremities spontaneously has generalized weakness    Result Review:  Vital signs, labs and recent relevant imaging reviewed.      CBC          1/13/2025    03:11 1/14/2025    03:18 1/15/2025    04:33   CBC   WBC 6.81  6.58  5.22    RBC 4.05  3.71  3.65    Hemoglobin 10.9  9.9  9.7    Hematocrit 37.9  34.6  33.2    MCV 93.6  93.3  91.0    MCH 26.9  26.7  26.6    MCHC 28.8  28.6  29.2    RDW 19.6  18.6  18.6    Platelets 226  187  177      CMP          1/13/2025    03:11 1/14/2025    03:18 1/15/2025    04:33   CMP   Glucose 77  153  119    BUN 29  14  21    Creatinine 4.68  3.01  4.57    EGFR 12.1  20.5  12.4    Sodium 136  134  132    Potassium 5.3  4.2  4.1    Chloride 100  99  96    Calcium 9.7  9.2  9.7    Total Protein  7.1  7.0    Albumin  3.0  3.0    Globulin  4.1  4.0    Total Bilirubin  0.6  0.6    Alkaline Phosphatase  114  114    AST (SGOT)  41  35    ALT (SGPT)  11  10    Albumin/Globulin Ratio  0.7  0.8    BUN/Creatinine Ratio 6.2   4.7  4.6    Anion Gap 11.4  10.3  12.2          acetaminophen    albumin human    artificial tears    senna-docusate sodium **AND** polyethylene glycol **AND** bisacodyl **AND** bisacodyl    calcium carbonate    dextrose    dextrose    Diclofenac Sodium    glucagon (human recombinant)    ipratropium-albuterol    Lidocaine    midodrine    ondansetron ODT    polyethyl glycol-propyl glycol    QUEtiapine    sodium chloride    sodium chloride    sodium chloride    sodium chloride    [Held by provider] acetaminophen, 1,000 mg, Oral, Q8H  arformoterol, 15 mcg, Nebulization, BID - RT  aspirin, 300 mg, Rectal, Daily  atorvastatin, 40 mg, Oral, Daily  budesonide, 0.5 mg, Nebulization, BID - RT  famotidine, 10 mg, Oral, Daily  heparin (porcine), 5,000 Units, Subcutaneous, Q12H  ipratropium-albuterol, 3 mL, Nebulization, BID - RT  levothyroxine, 175 mcg, Oral, Q AM  QUEtiapine, 37.5 mg, Oral, Nightly  [Held by provider] sevelamer, 800 mg, Oral, TID With Meals  sodium chloride, 10 mL, Intravenous, Q12H  sodium chloride, 10 mL, Intravenous, Q12H        EEG    Result Date: 1/13/2025  Underlying background suggest diffuse cerebral dysfunction of moderate degree, most commonly seen due to toxic/metabolic cause The triphasic sharp waves are abnormal but nonspecific.  These are most commonly seen due to significant toxic/metabolic disarray, most notably liver or kidney dysfunction.  They may also be seen as a side effect of certain medications such as cefepime.  Less likely, they can also be found on the ictal/interictal spectrum This report is transcribed using the Dragon dictation system.      XR Chest 1 View    Result Date: 1/12/2025  Increased bilateral infiltrates are seen. The findings may represent infectious multifocal pneumonia. Aspiration pneumonia cannot be excluded. Pulmonary edema is possible. A combination of these differential considerations may exist. Please see above comments for further detail.    Portions of this  note were completed with a voice recognition program.  Electronically Signed By-Stan Hogan MD On:1/12/2025 11:24 PM      CT Head Without Contrast Stroke Protocol    Result Date: 1/12/2025  Impression: Stable examination without acute intracranial process. Electronically Signed: Margaret Hammond MD  1/12/2025 10:57 AM EST  Workstation ID: [id number]    CT Pelvis Without Contrast    Result Date: 1/11/2025  There is a suspected acute-to-subacute nondisplaced periprosthetic fracture involving the proximal right femur, as discussed. No other acute fractures are seen. No dislocation.   Portions of this note were completed with a voice recognition program.  Electronically Signed By-Stan Hogan MD On:1/11/2025 9:15 PM       Assessment / Plan     Summary: 79 y.o. ESRD on dialysis, type 2 diabetes, dementia, discitis having completed vancomycin, A-fib, restrictive lung disease.  Patient recently discharged following long hospitalization, admitted from 12/5/2024 through 1/8/2025.  Patient discharged home with in-home dialysis.  Unfortunately today patient's dialysis machine not working.  Patient's primary nephrologist was contacted, unable to get patient into other outpatient dialysis facilities.  Therefore, decision was made to direct admit patient into the hospital for dialysis as he has not had dialysis for 3 days.  Hospitalist service contacted for admission orders, nephrology consulted for dialysis needs.      RRT called morning of 12th.  Patient was found to have fixed gaze to the right.  Patient only responding to painful stimuli minimally.  Stat CT obtained showing stable examination without acute intracranial process.  Teleneurology consulted during stroke RRT.  Felt altered mental status and confusion most likely secondary to toxic or metabolic etiology.  During stroke RRT patient noted to be hypotensive, while fluid bolus running, daughter endorses improvement in physical exam.  Patient was transferred to a  telemetry bed, blood pressures again started drifting down therefore felt safest to transfer patient to the ICU for pressors if needed.     Patient's CT scan of pelvis does reveal right periprosthetic proximal femur fracture.  Orthopedic surgery consulted and able to talk with patient and daughter at bedside.  At this time plan is for conservative measures, continue nonoperative treatment with toe-touch as able, continue to work with PT and OT and follow-up in clinic clos    Assessment/Plan (clinically significant if listed here)  Encephalopathy  End-stage renal disease on dialysis  Hypotension  Type 2 diabetes  Acute to subacute nondisplaced periprosthetic fracture involving proximal right femur  Dementia  Discitis, completed course of vancomycin  Atrial fibrillation not on anticoagulation due to falls  Restrictive lung disease    echo  ·  Left ventricular ejection fraction appears to be 61 - 65%.  ·  Left ventricular diastolic function is consistent with (grade I) impaired relaxation.  ·  The left atrial cavity is mildly dilated.  ·  Estimated right ventricular systolic pressure from tricuspid regurgitation is moderately elevated (45-55 mmHg).     EEG - IMPRESSION:   Underlying background suggest diffuse cerebral dysfunction of moderate degree, most commonly seen due to toxic/metabolic cause   The triphasic sharp waves are abnormal but nonspecific.  These are most commonly seen due to significant toxic/metabolic disarray, most notably liver or kidney dysfunction.  They may also be seen as a side effect of certain medications such as cefepime.  Less likely, they can also be found on the ictal/interictal spectrum      Mentation remains limited, continue delirium precautions and supportive care.  Deferring MRI as we will not be able to get adequate study as well as negative CT head from the 12th and discussion with family.    Spot check labs giving lab holiday to try and minimize agitation less acute change defer  repeat  Defer core track for now continue speech therapy evaluation advance diet as tolerated not currently able to  Nonoperative management of fracture per Ortho, monitor periodically with imaging outpatient, appreciate assistance - wbat, toe-touch as tolerated   IV  ofirmev prn for now until able to take po, avoiding opiates/sedating meds as able, try lidocaine patch, nonnarcotic options as able   Continue D10 low rate of fluids for now monitor blood glucose holding insulin, A1c was 5.6   Antibiotics currently off, had briefly been on cefepime may be contributing to EKG findings as well avoiding at this time  Continue supplemental oxygen as needed mainly at night or with sleep for sedation, when awake and taking good breaths stable on room air  Appreciate neuro assistance, rec's reviewed   Cont HD as per nephrology, appreciate assistance, monitor bp closely, additional session today per discussion  Midodrine on HD days prior to HD as able  Echo reaasonable, troponin change suspect in setting of esrd/hypotension  Cont po meds as able  Cont seroquel at night as able  Cont synthroid  Cont statin, aspirin rectal currently  Dnr/dni, cont goc discussions   PT/OT - cont as able   Spot checking labs       Dispo: home with family/caregivers once medically stable.   D/w SW    VTE Prophylaxis:  Pharmacologic & mechanical VTE prophylaxis orders are present.      Medical Intervention Limits: No intubation (DNI)  Code Status (Patient has no pulse and is not breathing): No CPR (Do Not Attempt to Resuscitate)  Medical Interventions (Patient has pulse or is breathing): Limited Support

## 2025-01-16 LAB
GLUCOSE BLDC GLUCOMTR-MCNC: 90 MG/DL (ref 70–99)
GLUCOSE BLDC GLUCOMTR-MCNC: 93 MG/DL (ref 70–99)

## 2025-01-16 PROCEDURE — 82948 REAGENT STRIP/BLOOD GLUCOSE: CPT

## 2025-01-16 PROCEDURE — 99232 SBSQ HOSP IP/OBS MODERATE 35: CPT | Performed by: STUDENT IN AN ORGANIZED HEALTH CARE EDUCATION/TRAINING PROGRAM

## 2025-01-16 PROCEDURE — 99232 SBSQ HOSP IP/OBS MODERATE 35: CPT | Performed by: INTERNAL MEDICINE

## 2025-01-16 PROCEDURE — 25010000002 ACETAMINOPHEN 10 MG/ML SOLUTION: Performed by: INTERNAL MEDICINE

## 2025-01-16 PROCEDURE — 94668 MNPJ CHEST WALL SBSQ: CPT

## 2025-01-16 PROCEDURE — 94799 UNLISTED PULMONARY SVC/PX: CPT

## 2025-01-16 PROCEDURE — 94664 DEMO&/EVAL PT USE INHALER: CPT

## 2025-01-16 PROCEDURE — 25010000002 HYDROMORPHONE 1 MG/ML SOLUTION: Performed by: STUDENT IN AN ORGANIZED HEALTH CARE EDUCATION/TRAINING PROGRAM

## 2025-01-16 PROCEDURE — 25010000002 HEPARIN (PORCINE) PER 1000 UNITS: Performed by: INTERNAL MEDICINE

## 2025-01-16 RX ORDER — QUETIAPINE FUMARATE 25 MG/1
12.5 TABLET, FILM COATED ORAL ONCE AS NEEDED
Status: DISCONTINUED | OUTPATIENT
Start: 2025-01-16 | End: 2025-01-18

## 2025-01-16 RX ORDER — ATORVASTATIN CALCIUM 40 MG/1
40 TABLET, FILM COATED ORAL DAILY
Status: DISCONTINUED | OUTPATIENT
Start: 2025-01-17 | End: 2025-01-24

## 2025-01-16 RX ORDER — MIDODRINE HYDROCHLORIDE 5 MG/1
5 TABLET ORAL DAILY PRN
Status: DISCONTINUED | OUTPATIENT
Start: 2025-01-16 | End: 2025-01-24

## 2025-01-16 RX ORDER — BISACODYL 5 MG/1
5 TABLET, DELAYED RELEASE ORAL DAILY PRN
Status: DISCONTINUED | OUTPATIENT
Start: 2025-01-16 | End: 2025-01-17 | Stop reason: SDUPTHER

## 2025-01-16 RX ORDER — POLYETHYLENE GLYCOL 3350 17 G/17G
17 POWDER, FOR SOLUTION ORAL DAILY PRN
Status: DISCONTINUED | OUTPATIENT
Start: 2025-01-16 | End: 2025-01-17 | Stop reason: SDUPTHER

## 2025-01-16 RX ORDER — FAMOTIDINE 10 MG/ML
10 INJECTION, SOLUTION INTRAVENOUS DAILY
Status: DISCONTINUED | OUTPATIENT
Start: 2025-01-16 | End: 2025-01-19

## 2025-01-16 RX ORDER — AMOXICILLIN 250 MG
2 CAPSULE ORAL 2 TIMES DAILY PRN
Status: DISCONTINUED | OUTPATIENT
Start: 2025-01-16 | End: 2025-01-24

## 2025-01-16 RX ORDER — CALCIUM CARBONATE 500 MG/1
1 TABLET, CHEWABLE ORAL 2 TIMES DAILY PRN
Status: DISCONTINUED | OUTPATIENT
Start: 2025-01-16 | End: 2025-01-24

## 2025-01-16 RX ORDER — QUETIAPINE FUMARATE 25 MG/1
37.5 TABLET, FILM COATED ORAL NIGHTLY
Status: DISCONTINUED | OUTPATIENT
Start: 2025-01-16 | End: 2025-01-18

## 2025-01-16 RX ORDER — GUAIFENESIN 200 MG/10ML
200 LIQUID ORAL EVERY 4 HOURS PRN
Status: DISCONTINUED | OUTPATIENT
Start: 2025-01-16 | End: 2025-01-17

## 2025-01-16 RX ORDER — BISACODYL 10 MG
10 SUPPOSITORY, RECTAL RECTAL DAILY PRN
Status: DISCONTINUED | OUTPATIENT
Start: 2025-01-16 | End: 2025-01-17 | Stop reason: SDUPTHER

## 2025-01-16 RX ADMIN — Medication 10 ML: at 22:10

## 2025-01-16 RX ADMIN — Medication 10 ML: at 08:45

## 2025-01-16 RX ADMIN — ACETAMINOPHEN 500 MG: 10 INJECTION, SOLUTION INTRAVENOUS at 14:26

## 2025-01-16 RX ADMIN — OFLOXACIN 2 DROP: 3 SOLUTION OPHTHALMIC at 12:31

## 2025-01-16 RX ADMIN — WHITE PETROLATUM 57.7 %-MINERAL OIL 31.9 % EYE OINTMENT: at 23:57

## 2025-01-16 RX ADMIN — LIDOCAINE 1 PATCH: 4 PATCH TOPICAL at 03:18

## 2025-01-16 RX ADMIN — ACETAMINOPHEN 500 MG: 10 INJECTION, SOLUTION INTRAVENOUS at 06:38

## 2025-01-16 RX ADMIN — ACETAMINOPHEN 500 MG: 10 INJECTION, SOLUTION INTRAVENOUS at 22:09

## 2025-01-16 RX ADMIN — IPRATROPIUM BROMIDE AND ALBUTEROL SULFATE 3 ML: 2.5; .5 SOLUTION RESPIRATORY (INHALATION) at 06:30

## 2025-01-16 RX ADMIN — BUDESONIDE 0.5 MG: 0.5 INHALANT ORAL at 18:31

## 2025-01-16 RX ADMIN — ARFORMOTEROL TARTRATE 15 MCG: 15 SOLUTION RESPIRATORY (INHALATION) at 06:31

## 2025-01-16 RX ADMIN — ARFORMOTEROL TARTRATE 15 MCG: 15 SOLUTION RESPIRATORY (INHALATION) at 18:31

## 2025-01-16 RX ADMIN — IPRATROPIUM BROMIDE AND ALBUTEROL SULFATE 3 ML: 2.5; .5 SOLUTION RESPIRATORY (INHALATION) at 18:31

## 2025-01-16 RX ADMIN — HEPARIN SODIUM 5000 UNITS: 5000 INJECTION INTRAVENOUS; SUBCUTANEOUS at 23:56

## 2025-01-16 RX ADMIN — Medication 10 ML: at 08:58

## 2025-01-16 RX ADMIN — FAMOTIDINE 10 MG: 10 INJECTION INTRAVENOUS at 08:44

## 2025-01-16 RX ADMIN — QUETIAPINE FUMARATE 37.5 MG: 25 TABLET ORAL at 23:55

## 2025-01-16 RX ADMIN — GUAIFENESIN 200 MG: 100 LIQUID ORAL at 23:56

## 2025-01-16 RX ADMIN — HEPARIN SODIUM 5000 UNITS: 5000 INJECTION INTRAVENOUS; SUBCUTANEOUS at 08:44

## 2025-01-16 RX ADMIN — HYDROMORPHONE HYDROCHLORIDE 0.25 MG: 1 INJECTION, SOLUTION INTRAMUSCULAR; INTRAVENOUS; SUBCUTANEOUS at 03:21

## 2025-01-16 RX ADMIN — OFLOXACIN 2 DROP: 3 SOLUTION OPHTHALMIC at 23:56

## 2025-01-16 RX ADMIN — OFLOXACIN 2 DROP: 3 SOLUTION OPHTHALMIC at 17:04

## 2025-01-16 RX ADMIN — OFLOXACIN 2 DROP: 3 SOLUTION OPHTHALMIC at 08:58

## 2025-01-16 RX ADMIN — BUDESONIDE 0.5 MG: 0.5 INHALANT ORAL at 06:30

## 2025-01-16 NOTE — PLAN OF CARE
Goal Outcome Evaluation:  Plan of Care Reviewed With: patient        Progress: no change  Outcome Evaluation: Discomfort noted during shift, patient holding onto right leg with restlessness noted and moaning. Medication given as ordered, appears to be resting at this time without restlessness being noted at this time. Continuing to monitor patient with sitter at bedside throughout shift.

## 2025-01-16 NOTE — CONSULTS
Consult received per Stroke Protocol. Patient with a noted DM diagnosis, with a current HbA1c of 5.6%, and an estimated average glucose of 114 mg/dl. Patient's diabetes is currently diet controlled.

## 2025-01-16 NOTE — SIGNIFICANT NOTE
01/16/25 1205   OTHER   Discipline speech language pathologist   Rehab Time/Intention   Session Not Performed patient unavailable for evaluation   Recommendations   SLP - Next Appointment 01/17/25     Not alert for evaluation.

## 2025-01-16 NOTE — NURSING NOTE
Duration of Treatment 4.0 Hours  Access Site AVF  Dialyzer Revaclear   mL/min  Dialysate Temperature (C) 36  BFR-As tolerated to a maximum of: 400 mL/min  Prime Dialyzer With NS to Priming Volume? Yes  Dialysate Solution Bath: K+ = 3 mEq, Ca = 2.5mEq  Bicarb 30 meq  Na+ 137 meq  Fluid Removal: 1L      Patient tolerated treatment well. Had issues with his venous needle, had to recannulate x2. Used 16g needle on venous. Dr Castro aware also okayed to change treatment time to 3.5hrs.   Ran 3.5hrs, removed 1000mls UF with BVP of 73.4  Reported off to Patricia MARADIAGA

## 2025-01-16 NOTE — PROGRESS NOTES
LOS: 5 days   Patient Care Team:  Domingo Bravo MD as PCP - General (Internal Medicine)  Josephine Warren APRN as Nurse Practitioner (Pulmonary Disease)  Virginie Lopez APRN as Nurse Practitioner (Pulmonary Disease)    Chief Complaint:  Renal issues    Subjective     No new events.  Comfortable today, received dilaudid overnight.    [Held by provider] acetaminophen, 1,000 mg, Oral, Q8H  arformoterol, 15 mcg, Nebulization, BID - RT  atorvastatin, 40 mg, Oral, Daily  budesonide, 0.5 mg, Nebulization, BID - RT  famotidine, 10 mg, Intravenous, Daily  [Held by provider] famotidine, 10 mg, Oral, Daily  heparin (porcine), 5,000 Units, Subcutaneous, Q12H  ipratropium-albuterol, 3 mL, Nebulization, BID - RT  levothyroxine, 175 mcg, Oral, Q AM  ofloxacin, 2 drop, Left Eye, 4x Daily  QUEtiapine, 37.5 mg, Oral, Nightly  [Held by provider] sevelamer, 800 mg, Oral, TID With Meals  sodium chloride, 10 mL, Intravenous, Q12H  sodium chloride, 10 mL, Intravenous, Q12H      dextrose, 50 mL/hr, Last Rate: 50 mL/hr (01/15/25 2343)        Review of Systems:   Unable  Objective     Vital Signs  Temp:  [97.7 °F (36.5 °C)-98.2 °F (36.8 °C)] 97.9 °F (36.6 °C)  Heart Rate:  [56-86] 65  Resp:  [12-20] 16  BP: (112-170)/(39-92) 130/40    Intake/Output Summary (Last 24 hours) at 1/16/2025 0849  Last data filed at 1/15/2025 1900  Gross per 24 hour   Intake --   Output 1000 ml   Net -1000 ml             Physical Exam:     General Appearance:    Poorly responsive,  in no acute distress, moaning.   Head:    Normocephalic, without obvious abnormality, atraumatic   Throat:    dry   Neck:   No adenopathy, supple, no JVD       Lungs:     Clear to auscultation,respirations unlabored, no wheezes or rhonchi    Heart:    Regular rate and rhythm , normal S1 and S2, no            murmur, no gallop, no rub   Abdomen:     Normal bowel sounds, no masses, soft non-tender   Extremities:    No lower extremity edema, no cyanosis, L arm fistula       Skin:    "Dry, No rash.  Skin discoloration from psoriasis noted.                  Results Review:    Results from last 7 days   Lab Units 01/15/25  0433 01/14/25  0318 01/13/25  0311   SODIUM mmol/L 132* 134* 136   POTASSIUM mmol/L 4.1 4.2 5.3*   CHLORIDE mmol/L 96* 99 100   CO2 mmol/L 23.8 24.7 24.6   BUN mg/dL 21 14 29*   CREATININE mg/dL 4.57* 3.01* 4.68*   GLUCOSE mg/dL 119* 153* 77   CALCIUM mg/dL 9.7 9.2 9.7     Results from last 7 days   Lab Units 01/15/25  0433 01/14/25  0318 01/13/25  0311   WBC 10*3/mm3 5.22 6.58 6.81   HEMOGLOBIN g/dL 9.7* 9.9* 10.9*   HEMATOCRIT % 33.2* 34.6* 37.9   PLATELETS 10*3/mm3 177 187 226     Magnesium   Date Value Ref Range Status   01/15/2025 2.2 1.6 - 2.4 mg/dL Final   01/14/2025 1.8 1.6 - 2.4 mg/dL Final     Phosphorus   Date Value Ref Range Status   01/15/2025 4.0 2.5 - 4.5 mg/dL Final   01/14/2025 3.3 2.5 - 4.5 mg/dL Final     No results found for: \"BNP\"  Lab Results   Component Value Date    ALBUMIN 3.0 (L) 01/15/2025      Brief Urine Lab Results  (Last result in the past 365 days)        Color   Clarity   Blood   Leuk Est   Nitrite   Protein   CREAT   Urine HCG        10/27/24 1417 Yellow   Cloudy   Negative   Trace   Negative   >=300 mg/dL (3+)                    No radiology results from the last 24 hrs         Assessment & Plan       ESRD (end stage renal disease) on dialysis    Moderate protein-calorie malnutrition    - ESRD, was on home dialysis prior to admission, has left upper extremity AV fistula for access.  Electrolytes are stable.  Dialysis m/w/f.  Discussed with dialysis staff.      - Type 2 diabetes with complications.     - Hypotension blood pressure is acceptable now, on ProAmatine, off pressors,    - Anemia of chronic kidney disease, close to goal     - Altered mentation.  Delirium, may be slightly better than 2 days ago.    _R femur FX- ortho seeing    Please call if any questions  [phone]   "

## 2025-01-16 NOTE — PROGRESS NOTES
Pulmonary / Critical Care Progress Note      Patient Name: Nelson Wang  : 1945  MRN: [id number]  Attending:  Devante Rosado MD   Date of admission: 2025    Subjective   Subjective   Transfer out of ICU follow-up    Lying comfortably in bed this morning  Family member at bedside  Sitter at bedside  Remains on 2 L nasal cannula    ROS  Unable to obtain    Objective   Objective     Vitals:   Vital signs for last 24 hours:  Temp:  [97.7 °F (36.5 °C)-98.2 °F (36.8 °C)] 97.9 °F (36.6 °C)  Heart Rate:  [56-86] 69  Resp:  [12-18] 16  BP: (112-170)/(39-92) 132/61    Intake/Output last 3 shifts:  I/O last 3 completed shifts:  In: -   Out: 1000   Intake/Output this shift:  No intake/output data recorded.    Vent settings for last 24 hours:       Hemodynamic parameters for last 24 hours:       Physical Exam:  Vital Signs Reviewed   General:  WDWN, NAD.  Elderly male, lying in bed  HEENT:  PERRL, EOMI.  OP, nares clear  Chest: diminished with crackles bilaterally, tympanic to percussion bilaterally, no work of breathing noted on 2 L nasal cannula  CV: RRR, no MGR, pulses 2+, equal.  Abd:  Soft, NT, ND, + BS, no HSM  EXT:  no clubbing, no cyanosis, no edema  Neuro:  A&Ox0, CN grossly intact, no focal deficits.    Skin: No rashes or lesions noted        Result Review    Result Review:  I have personally reviewed the results from the time of this admission to 2025 12:28 EST and agree with these findings:  [x]  Laboratory  [x]  Microbiology  [x]  Radiology  [x]  EKG/Telemetry   [x]  Cardiology/Vascular   []  Pathology  []  Old records  []  Other:  Most notable findings include:       Lab 01/15/25  0433 25  0318 25  0311 25  1030 25  0734 25  1418   WBC 5.22 6.58 6.81 7.11 7.79 5.43   HEMOGLOBIN 9.7* 9.9* 10.9* 10.9* 10.4* 10.7*   HEMATOCRIT 33.2* 34.6* 37.9 37.4* 35.5* 35.2*   PLATELETS 177 187 226 237 209 224   SODIUM 132* 134* 136  --  130*  130* 137   POTASSIUM 4.1 4.2 5.3*  --   4.6  4.6 4.4   CHLORIDE 96* 99 100  --  94*  94* 98   CO2 23.8 24.7 24.6  --  26.9  27.9 24.2   BUN 21 14 29*  --  21  21 50*   CREATININE 4.57* 3.01* 4.68*  --  3.81*  3.66* 6.07*   GLUCOSE 119* 153* 77  --  112*  113* 92   CALCIUM 9.7 9.2 9.7  --  10.2  10.0 10.3   PHOSPHORUS 4.0 3.3 6.3*  --  4.4  --    TOTAL PROTEIN 7.0 7.1  --   --  6.8  --    ALBUMIN 3.0* 3.0*  --   --  3.0*  --    GLOBULIN 4.0 4.1  --   --  3.8  --      CT Head Without Contrast Stroke Protocol    Result Date: 1/12/2025  CT HEAD WO CONTRAST STROKE PROTOCOL Date of Exam: 1/12/2025 10:31 AM EST Indication: stroke r/o. Comparison: CT head 12/30/2024 Technique: Axial CT images were obtained of the head without contrast administration.  Reconstructed coronal and sagittal images were also obtained. Automated exposure control and iterative construction methods were used. Scan Time: 10:43 a.m. Results discussed with Nathaniel at 10:56 a.m.. Findings: There is no evidence of acute infarction. There there is no acute intracranial hemorrhage. There are no extra-axial collections. Ventricles and CSF spaces are symmetric. No mass effect nor hydrocephalus. There are periventricular white matter changes and cerebral atrophy which appears age-related.  There is fluid opacification of the right mastoid air cells. Paranasal sinuses appear aerated.  Osseous structures and orbits appear intact. There is some soft tissue prominence along the right posterior calvarium which may represent a small hematoma.     Impression: Impression: Stable examination without acute intracranial process. Electronically Signed: Margaret Hammond MD  1/12/2025 10:57 AM EST  Workstation ID: [id number]    CT Head Without Contrast    Result Date: 12/30/2024  CT HEAD WO CONTRAST Date of Exam: 12/30/2024 10:23 PM EST Indication: altered mental status. Comparison: CT head without contrast 12/28/2024 Technique: Axial CT images were obtained of the head without contrast administration.   Reconstructed coronal and sagittal images were also obtained. Automated exposure control and iterative construction methods were used. Findings: Image quality slightly degraded due to motion. There is no intracranial hemorrhage. No mass effect or midline shift. Generalized cerebral volume loss unchanged. Moderate white matter findings suggesting chronic microvascular disease. No intraventricular hemorrhage. Posterior fossa without acute abnormality. No abnormal extra-axial fluid collection. The globes are symmetric. There is no retro-orbital abnormality. The mastoid air cells are well-aerated on the left. Small amount of fluid in the right mastoid air cells. No sinus fluid level. Negative for calvarial fracture.     Impression: Impression: 1. No intracranial hemorrhage or acute intracranial abnormality.. 2. Generalized cerebral atrophy with moderate white matter findings of chronic microvascular disease. Electronically Signed: Lasha Ramos MD  12/30/2024 10:38 PM EST  Workstation ID: [id number]    CT Head Without Contrast    Result Date: 12/28/2024  CT CERVICAL SPINE WO CONTRAST-, CT HEAD WO CONTRAST-  (COMBINED REPORT)  Date of exam: 12/28/2024, 10:12 P.M.  Indication: fall; j96.01-acute respiratory failure with hypoxia; j81.0-acute pulmonary edema; r26.2-difficulty in walking, not elsewhere classified; r13.12-dysphagia, oropharyngeal phase  Comparisons: 12/13/2024; 11/2/2024.  TECHNIQUE: Axial CT images were obtained of the head & cervical spine without contrast administration. Reconstructed 2D (two-dimensional) coronal and sagittal images were also obtained. Automated exposure control and iterative construction methods were used. Additionally, the radiation dose reduction device was turned on for each scan per the ALARA (As Low as Reasonably Achievable) protocol. Please see the electronic medical record, EMR (i.e., Epic), for the documented radiation dose.  EXAM FINDINGS:   HEAD CT: A routine nonenhanced head CT  was performed. No acute brain abnormality is seen. No acute infarct. No acute intracranial hemorrhage. No midline shift or acute intracranial mass effect is seen. The ventricular system and the extra-axial spaces are prominent, suggesting central atrophy. There is suspected moderate-to-severe chronic small vessel ischemic disease. Arterial calcifications are seen. No acute skull fracture is identified. The patient has undergone bilateral cataract extractions with intra-ocular lens implants. Age-indeterminate congestive and/or inflammatory changes involve the right mastoid air cell complex, seen previously. Grossly, no significant acute findings are seen within the imaged paranasal sinuses. There is a suspected mild acute scalp contusion along the high right parietal region. The study is motion limited.  CONCLUSION: No acute brain abnormality is seen. No acute intracranial hemorrhage. No acute skull fracture.   CERVICAL SPINE CT: A routine nonenhanced cervical spine CT was performed. Again, sagittal and coronal two-dimensional (2D) reformations are provided for review. The study is motion-limited. Grossly, no acute cervical spine fracture or acute malalignment is identified. Moderate-to-severe degenerative changes are seen at multiple levels with chronic malalignment. 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 Small nonspecific bilateral cervical lymph nodes are seen. There are arterial calcifications. If symptoms or clinical concerns persist, consider imaging follow-up.  CONCLUSION: Grossly, no acute cervical spine fracture is seen. Grossly, no acute subluxation abnormality. The study is motion-limited.    Portions of this note were completed with a voice recognition program.  Electronically Signed By-Stan Hogan MD On:12/28/2024 11:23 PM       Results for orders placed during the hospital encounter of 01/11/25    Adult Transthoracic Echo Complete w/ Color,  Spectral and Contrast if necessary per protocol    Interpretation Summary    Left ventricular ejection fraction appears to be 61 - 65%.    Left ventricular diastolic function is consistent with (grade I) impaired relaxation.    The left atrial cavity is mildly dilated.    Estimated right ventricular systolic pressure from tricuspid regurgitation is moderately elevated (45-55 mmHg).      Procalcitonin and cultures negative  EEG with features consistent with encephalopathy        Assessment & Plan   Assessment / Plan     Active Hospital Problems:  Active Hospital Problems    Diagnosis     **ESRD (end stage renal disease) on dialysis     Moderate protein-calorie malnutrition      Impression:  Altered mental status  Toxic/metabolic encephalopathy possible hypoactive delirium  Hypotension  NSTEMI, likely from demand ischemia/type II  End-stage renal disease on hemodialysis  Chronic compensated diastolic congestive heart failure  Possible sepsis  Stroke rule out  Type 2 diabetes, now with hypoglycemia  Discitis status post course of vancomycin  Atrial fibrillation  Small chronic right pleural effusion  Femur fracture     Plan:  Remains on 2 L nasal cannula.  Keep SpO2 greater than 90%.  Timing of dialysis per nephrology.  Appreciate assistance  Still confused this morning.   EEG consistent with encephalopathy.  Speech therapy input when mentation improves  Appreciate orthopedic surgery assistance.  Recommended nonoperative management at this time  Echocardiogram with no obvious wall motion abnormalities.  Has stable diastolic dysfunction  Discontinue antibiotics given cultures are negative and procalcitonin is normal.  Also discontinuing cefepime will limit any further confounding factors regarding the patient's altered mental status  Limit all sedating medications at this time other than nightly Seroquel  Continue nebulizers and add bronchopulmonary hygiene  Pulmonary will follow peripherally.  Please call with  questions.    VTE Prophylaxis:  Pharmacologic & mechanical VTE prophylaxis orders are present.    CODE STATUS:   Medical Intervention Limits: No intubation (DNI)  Code Status (Patient has no pulse and is not breathing): No CPR (Do Not Attempt to Resuscitate)  Medical Interventions (Patient has pulse or is breathing): Limited Support    Electronically signed by Sandra Espinal MD, 01/16/25, 12:30 PM EST.

## 2025-01-16 NOTE — CONSULTS
"Nutrition Services    Patient Name: Nelson Wang  YOB: 1945  MRN: [id number]  Admission date: 1/11/2025      CLINICAL NUTRITION ASSESSMENT      Reason for Assessment  Physician Consult and Tube Feeding Assessment   H&P:  Past Medical History:   Diagnosis Date    Abnormal stress test     Anemia     IRON INFUSIONS WITH DIALYSIS    Anesthesia     WITH HIP REPLACEMENT DAUGHTER BELIEVES HAD SVT 2000    Ankle pain, right     Anxiety and depression     Arthritis     CKD (chronic kidney disease), stage IV     DIALYSIS [id number] SHILEY IN RIGHT CHEST    Diabetes     GERD (gastroesophageal reflux disease)     Gout     High cholesterol     History of peritoneal dialysis     HL (hearing loss)     Hyperkalemia     Hypertension     Hypothyroidism     Insomnia     Night terrors     Psoriasis     Psoriasis     Sleep apnea     Sleep walking     Thrombocytopenia     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    Vitiligo         Current Problems:   Active Hospital Problems    Diagnosis     **ESRD (end stage renal disease) on dialysis     Moderate protein-calorie malnutrition         Nutrition/Diet History         Narrative   Consult for tube feed received. Cortrak placement ordered. Pt remains NPO, as he is not alert for SLP eval. Initiate EN as feasible. Replete electrolytes as needed. RD recs provided below.      Anthropometrics        Current Height, Weight Height: 162.5 cm (63.98\")      Current BMI Body mass index is 24.39 kg/m².   BMI Classification Normal range   % %   Adjusted Body Weight (ABW) N/A   Weight Hx  Wt Readings from Last 30 Encounters:   01/08/25 0516 64.4 kg (141 lb 15.6 oz)   01/07/25 0500 66.6 kg (146 lb 13.2 oz)   01/06/25 0500 64.3 kg (141 lb 12.1 oz)   01/05/25 0500 65.1 kg (143 lb 8.3 oz)   01/04/25 0521 64 kg (141 lb 1.5 oz)   01/02/25 0417 64.4 kg (141 lb 15.6 oz)   01/01/25 0600 62 kg (136 lb 11 oz)   12/31/24 0600 62.7 kg (138 lb 3.7 oz)   12/30/24 0917 63.2 kg (139 lb 5.3 oz) "   12/29/24 0500 61.8 kg (136 lb 3.9 oz)   12/28/24 0635 63 kg (138 lb 14.2 oz)   12/27/24 0541 63.1 kg (139 lb 1.8 oz)   12/26/24 0738 63.1 kg (139 lb 3.2 oz)   12/25/24 0333 67 kg (147 lb 11.3 oz)   12/24/24 0530 67.9 kg (149 lb 11.1 oz)   12/23/24 0418 68.1 kg (150 lb 2.1 oz)   12/22/24 0525 68.6 kg (151 lb 3.8 oz)   12/21/24 0500 75.4 kg (166 lb 3.6 oz)   12/20/24 0350 67.4 kg (148 lb 9.4 oz)   12/18/24 2009 65.3 kg (143 lb 15.4 oz)   12/18/24 0300 67 kg (147 lb 11.3 oz)   12/17/24 0440 65.8 kg (145 lb 1 oz)   12/16/24 0547 64.3 kg (141 lb 12.1 oz)   12/13/24 0407 62.5 kg (137 lb 12.6 oz)   12/12/24 0500 69.3 kg (152 lb 12.5 oz)   12/09/24 0600 70.2 kg (154 lb 12.2 oz)   12/06/24 1228 70.7 kg (155 lb 13.8 oz)   12/05/24 1850 75.3 kg (166 lb 0.1 oz)   12/01/24 1701 71 kg (156 lb 8.4 oz)   11/27/24 1247 71.6 kg (157 lb 12.8 oz)   11/22/24 1311 71.8 kg (158 lb 3.2 oz)   11/18/24 0351 67.9 kg (149 lb 11.1 oz)   11/17/24 0255 71.9 kg (158 lb 8.2 oz)   11/16/24 0316 67.9 kg (149 lb 11.1 oz)   11/14/24 0611 73.9 kg (162 lb 14.7 oz)   11/13/24 0500 73 kg (161 lb)   11/12/24 0523 73.2 kg (161 lb 6 oz)   11/11/24 1200 72.8 kg (160 lb 7.9 oz)   11/10/24 0500 74.4 kg (164 lb 0.4 oz)   11/08/24 0640 73 kg (160 lb 14.4 oz)   11/07/24 0621 72.7 kg (160 lb 3.2 oz)   11/05/24 0600 68.9 kg (151 lb 14.4 oz)   11/04/24 1402 67 kg (147 lb 11.3 oz)   11/04/24 0410 67.9 kg (149 lb 11.1 oz)   11/01/24 0159 74.3 kg (163 lb 12.8 oz)   10/27/24 0700 72.5 kg (159 lb 13.3 oz)   10/27/24 0346 75.7 kg (166 lb 14.2 oz)   10/09/24 1402 75.7 kg (166 lb 12.8 oz)   10/08/24 1455 75.3 kg (166 lb 0.1 oz)   10/02/24 0812 77.5 kg (170 lb 13.7 oz)   09/28/24 0330 77.2 kg (170 lb 3.1 oz)   09/27/24 1659 76.2 kg (167 lb 15.9 oz)   09/26/24 0823 78.9 kg (174 lb)   09/16/24 1814 79.3 kg (174 lb 13.2 oz)   09/12/24 1344 77.1 kg (169 lb 15.6 oz)   08/30/24 0927 75.6 kg (166 lb 10.7 oz)   08/14/24 0922 76.7 kg (169 lb)   07/17/24 1116 77.2 kg (170 lb 3.2 oz)    07/16/24 0817 73.8 kg (162 lb 11.2 oz)   07/10/24 0740 73.5 kg (162 lb)   06/17/24 1604 74.6 kg (164 lb 6.4 oz)   06/10/24 1348 80 kg (176 lb 5.9 oz)   06/06/24 1423 72 kg (158 lb 11.2 oz)   05/29/24 0720 72.9 kg (160 lb 11.5 oz)   05/28/24 1015 74.5 kg (164 lb 3.9 oz)   05/24/24 0917 74.6 kg (164 lb 6.4 oz)   05/03/24 1100 73 kg (160 lb 14.4 oz)   05/03/24 0838 74.6 kg (164 lb 6.4 oz)   05/01/24 0847 73.5 kg (162 lb)   04/24/24 1105 73.8 kg (162 lb 9.6 oz)   04/19/24 1300 74.5 kg (164 lb 3.2 oz)   04/14/24 1735 71.7 kg (158 lb 1.1 oz)   04/08/24 2128 78 kg (171 lb 15.3 oz)   03/24/24 0526 78 kg (171 lb 15.3 oz)          Wt Change Observation UTD, fluctuations noted related to fluids. Pt with ESRD on HD. Overall trend down, -18% x 10 mo      Estimated/Assessed Needs  Estimated Needs based on: Ideal Body Weight       Energy Requirements 30-35 kcal/kg    EST Needs (kcal/day) 0242-8952       Protein Requirements 1.2-1.5 g.kg   EST Daily Needs (g/day) 71-89       Fluid Requirements 20-25 ml/kg    Estimated Needs (mL/day) 0339-2162     Labs/Medications         Pertinent Labs Reviewed.   Results from last 7 days   Lab Units 01/15/25  0433 01/14/25  0318 01/13/25  0311 01/12/25  0734   SODIUM mmol/L 132* 134* 136 130*  130*   POTASSIUM mmol/L 4.1 4.2 5.3* 4.6  4.6   CHLORIDE mmol/L 96* 99 100 94*  94*   CO2 mmol/L 23.8 24.7 24.6 26.9  27.9   BUN mg/dL 21 14 29* 21  21   CREATININE mg/dL 4.57* 3.01* 4.68* 3.81*  3.66*   CALCIUM mg/dL 9.7 9.2 9.7 10.2  10.0   BILIRUBIN mg/dL 0.6 0.6  --  0.6   ALK PHOS U/L 114 114  --  115   ALT (SGPT) U/L 10 11  --  11   AST (SGOT) U/L 35 41*  --  30   GLUCOSE mg/dL 119* 153* 77 112*  113*     Results from last 7 days   Lab Units 01/15/25  0433 01/14/25  0318 01/13/25  0311   MAGNESIUM mg/dL 2.2 1.8 2.1   PHOSPHORUS mg/dL 4.0 3.3 6.3*   HEMOGLOBIN g/dL 9.7* 9.9* 10.9*   HEMATOCRIT % 33.2* 34.6* 37.9   TRIGLYCERIDES mg/dL  --   --  136     COVID19   Date Value Ref Range Status    01/13/2025 Not Detected Not Detected - Ref. Range Final     Lab Results   Component Value Date    HGBA1C 5.60 01/13/2025         Pertinent Medications Reviewed.     Malnutrition Severity Assessment      Patient meets criteria for : (S) Moderate (non-severe) Malnutrition         Nutrition Diagnosis         Nutrition Dx Problem 1 Moderate malnutrition related to Inability to consume sufficient energy as evidenced by unintended weight change., NPO., and repeated admissions decreasing intake, HD/PD increasing needs     Nutrition Intervention           Current Nutrition Orders & Evaluation of Intake       Current PO Diet NPO Diet NPO Type: Tube Feeding   Supplement Orders Placed This Encounter      Dietary Nutrition Supplements Novasource Renal (Nepro); café mocha      Feeding Tube Insertion - Cortrak System      Tube Feeding: Formula: RD to Initiate & Manage           Nutrition Intervention/Prescription        Advance PO diet as safely able to High Protein, Renal  If consent obtained, begin EN:    NovaSource Renal @ 45mLhr, flush 40mL q2h  Start at 15mL/hr and advance 10mL q8h to goal    Provides: 1980 kcal, 90g pro, 1183mL fluid (703 FW + 480 flush)        Medical Nutrition Therapy/Nutrition Education          Learner     Readiness Patient  Education not indicated at this time     Method     Response N/A  N/A     Monitor/Evaluation        Monitor Per protocol, Pertinent labs, POC/GOC, Diet advancement     Nutrition Discharge Plan         To be determined     Electronically signed by:  Stephie Garsia RD  01/16/25 12:56 EST

## 2025-01-17 ENCOUNTER — APPOINTMENT (OUTPATIENT)
Dept: CARDIOLOGY | Facility: HOSPITAL | Age: 80
End: 2025-01-17
Payer: MEDICARE

## 2025-01-17 ENCOUNTER — APPOINTMENT (OUTPATIENT)
Dept: GENERAL RADIOLOGY | Facility: HOSPITAL | Age: 80
End: 2025-01-17
Payer: MEDICARE

## 2025-01-17 LAB
ALBUMIN SERPL-MCNC: 2.5 G/DL (ref 3.5–5.2)
ALBUMIN SERPL-MCNC: 2.6 G/DL (ref 3.5–5.2)
ALBUMIN/GLOB SERPL: 0.6 G/DL
ALP SERPL-CCNC: 118 U/L (ref 39–117)
ALT SERPL W P-5'-P-CCNC: 9 U/L (ref 1–41)
ANION GAP SERPL CALCULATED.3IONS-SCNC: 10 MMOL/L (ref 5–15)
ANION GAP SERPL CALCULATED.3IONS-SCNC: 12.9 MMOL/L (ref 5–15)
APTT PPP: 117.2 SECONDS (ref 78–95.9)
APTT PPP: 35.1 SECONDS (ref 78–95.9)
APTT PPP: 46.2 SECONDS (ref 78–95.9)
ARTERIAL PATENCY WRIST A: POSITIVE
ARTERIAL PATENCY WRIST A: POSITIVE
AST SERPL-CCNC: 29 U/L (ref 1–40)
ATMOSPHERIC PRESS: 746.7 MMHG
ATMOSPHERIC PRESS: 747.6 MMHG
BACTERIA SPEC AEROBE CULT: NORMAL
BACTERIA SPEC AEROBE CULT: NORMAL
BASE EXCESS BLDA CALC-SCNC: -0.8 MMOL/L (ref -2–2)
BASE EXCESS BLDA CALC-SCNC: 1.3 MMOL/L (ref -2–2)
BASOPHILS # BLD AUTO: 0.05 10*3/MM3 (ref 0–0.2)
BASOPHILS # BLD AUTO: 0.07 10*3/MM3 (ref 0–0.2)
BASOPHILS NFR BLD AUTO: 1 % (ref 0–1.5)
BASOPHILS NFR BLD AUTO: 1.3 % (ref 0–1.5)
BDY SITE: ABNORMAL
BDY SITE: ABNORMAL
BILIRUB SERPL-MCNC: 0.6 MG/DL (ref 0–1.2)
BUN SERPL-MCNC: 15 MG/DL (ref 8–23)
BUN SERPL-MCNC: 17 MG/DL (ref 8–23)
BUN/CREAT SERPL: 3.8 (ref 7–25)
BUN/CREAT SERPL: 4.3 (ref 7–25)
CA-I BLDA-SCNC: 1.47 MMOL/L (ref 1.13–1.32)
CALCIUM SPEC-SCNC: 9.4 MG/DL (ref 8.6–10.5)
CALCIUM SPEC-SCNC: 9.6 MG/DL (ref 8.6–10.5)
CHLORIDE BLDA-SCNC: 95 MMOL/L (ref 98–107)
CHLORIDE SERPL-SCNC: 96 MMOL/L (ref 98–107)
CHLORIDE SERPL-SCNC: 97 MMOL/L (ref 98–107)
CO2 SERPL-SCNC: 22.1 MMOL/L (ref 22–29)
CO2 SERPL-SCNC: 26 MMOL/L (ref 22–29)
CORTIS SERPL-MCNC: 1.3 MCG/DL
CREAT SERPL-MCNC: 3.91 MG/DL (ref 0.76–1.27)
CREAT SERPL-MCNC: 4 MG/DL (ref 0.76–1.27)
D-LACTATE SERPL-SCNC: 1 MMOL/L
D-LACTATE SERPL-SCNC: 1.5 MMOL/L (ref 0.5–2)
D-LACTATE SERPL-SCNC: 2.3 MMOL/L (ref 0.5–2)
DEPRECATED RDW RBC AUTO: 62.7 FL (ref 37–54)
DEPRECATED RDW RBC AUTO: 63.7 FL (ref 37–54)
EGFRCR SERPLBLD CKD-EPI 2021: 14.6 ML/MIN/1.73
EGFRCR SERPLBLD CKD-EPI 2021: 15 ML/MIN/1.73
EOSINOPHIL # BLD AUTO: 0.16 10*3/MM3 (ref 0–0.4)
EOSINOPHIL # BLD AUTO: 0.19 10*3/MM3 (ref 0–0.4)
EOSINOPHIL NFR BLD AUTO: 3.3 % (ref 0.3–6.2)
EOSINOPHIL NFR BLD AUTO: 3.6 % (ref 0.3–6.2)
ERYTHROCYTE [DISTWIDTH] IN BLOOD BY AUTOMATED COUNT: 18.7 % (ref 12.3–15.4)
ERYTHROCYTE [DISTWIDTH] IN BLOOD BY AUTOMATED COUNT: 18.7 % (ref 12.3–15.4)
GAS FLOW AIRWAY: 2 LPM
GEN 5 1HR TROPONIN T REFLEX: 1886 NG/L
GLOBULIN UR ELPH-MCNC: 4 GM/DL
GLUCOSE BLDC GLUCOMTR-MCNC: 105 MG/DL (ref 70–99)
GLUCOSE BLDC GLUCOMTR-MCNC: 157 MG/DL (ref 70–99)
GLUCOSE BLDC GLUCOMTR-MCNC: 158 MG/DL (ref 70–99)
GLUCOSE BLDC GLUCOMTR-MCNC: 88 MG/DL (ref 70–99)
GLUCOSE BLDC GLUCOMTR-MCNC: 91 MG/DL (ref 70–99)
GLUCOSE BLDC GLUCOMTR-MCNC: 97 MG/DL (ref 70–99)
GLUCOSE BLDC GLUCOMTR-MCNC: 98 MG/DL (ref 70–99)
GLUCOSE SERPL-MCNC: 93 MG/DL (ref 65–99)
GLUCOSE SERPL-MCNC: 98 MG/DL (ref 65–99)
HCO3 BLDA-SCNC: 25.1 MMOL/L (ref 22–26)
HCO3 BLDA-SCNC: 30.8 MMOL/L (ref 22–26)
HCT VFR BLD AUTO: 32.5 % (ref 37.5–51)
HCT VFR BLD AUTO: 32.9 % (ref 37.5–51)
HCT VFR BLD CALC: 33 % (ref 38–51)
HCT VFR BLD CALC: 34 % (ref 38–51)
HEMODILUTION: NO
HEMODILUTION: YES
HGB BLD-MCNC: 9.4 G/DL (ref 13–17.7)
HGB BLD-MCNC: 9.4 G/DL (ref 13–17.7)
HGB BLDA-MCNC: 11.4 G/DL (ref 12–18)
HGB BLDA-MCNC: 11.6 G/DL (ref 12–18)
IMM GRANULOCYTES # BLD AUTO: 0.01 10*3/MM3 (ref 0–0.05)
IMM GRANULOCYTES # BLD AUTO: 0.02 10*3/MM3 (ref 0–0.05)
IMM GRANULOCYTES NFR BLD AUTO: 0.2 % (ref 0–0.5)
IMM GRANULOCYTES NFR BLD AUTO: 0.4 % (ref 0–0.5)
INHALED O2 CONCENTRATION: 28 %
INHALED O2 CONCENTRATION: 50 %
INR PPP: 1.07 (ref 0.86–1.15)
LYMPHOCYTES # BLD AUTO: 1.69 10*3/MM3 (ref 0.7–3.1)
LYMPHOCYTES # BLD AUTO: 2.07 10*3/MM3 (ref 0.7–3.1)
LYMPHOCYTES NFR BLD AUTO: 34.8 % (ref 19.6–45.3)
LYMPHOCYTES NFR BLD AUTO: 39.4 % (ref 19.6–45.3)
Lab: ABNORMAL
MAGNESIUM SERPL-MCNC: 2 MG/DL (ref 1.6–2.4)
MCH RBC QN AUTO: 26.6 PG (ref 26.6–33)
MCH RBC QN AUTO: 26.9 PG (ref 26.6–33)
MCHC RBC AUTO-ENTMCNC: 28.6 G/DL (ref 31.5–35.7)
MCHC RBC AUTO-ENTMCNC: 28.9 G/DL (ref 31.5–35.7)
MCV RBC AUTO: 92.9 FL (ref 79–97)
MCV RBC AUTO: 93.2 FL (ref 79–97)
MODALITY: ABNORMAL
MODALITY: ABNORMAL
MONOCYTES # BLD AUTO: 0.56 10*3/MM3 (ref 0.1–0.9)
MONOCYTES # BLD AUTO: 0.61 10*3/MM3 (ref 0.1–0.9)
MONOCYTES NFR BLD AUTO: 10.7 % (ref 5–12)
MONOCYTES NFR BLD AUTO: 12.6 % (ref 5–12)
MYCOBACTERIUM SPEC CULT: NORMAL
NEUTROPHILS NFR BLD AUTO: 2.33 10*3/MM3 (ref 1.7–7)
NEUTROPHILS NFR BLD AUTO: 2.35 10*3/MM3 (ref 1.7–7)
NEUTROPHILS NFR BLD AUTO: 44.8 % (ref 42.7–76)
NEUTROPHILS NFR BLD AUTO: 47.9 % (ref 42.7–76)
NIGHT BLUE STAIN TISS: NORMAL
NOTIFIED WHO: ABNORMAL
NRBC BLD AUTO-RTO: 0 /100 WBC (ref 0–0.2)
NRBC BLD AUTO-RTO: 0 /100 WBC (ref 0–0.2)
PCO2 BLDA: 45.7 MM HG (ref 35–45)
PCO2 BLDA: 75.8 MM HG (ref 35–45)
PEEP RESPIRATORY: 5 CM[H2O]
PH BLDA: 7.22 PH UNITS (ref 7.35–7.45)
PH BLDA: 7.35 PH UNITS (ref 7.35–7.45)
PHOSPHATE SERPL-MCNC: 4.6 MG/DL (ref 2.5–4.5)
PHOSPHATE SERPL-MCNC: 5.2 MG/DL (ref 2.5–4.5)
PLATELET # BLD AUTO: 141 10*3/MM3 (ref 140–450)
PLATELET # BLD AUTO: 159 10*3/MM3 (ref 140–450)
PMV BLD AUTO: 10.2 FL (ref 6–12)
PMV BLD AUTO: 9.7 FL (ref 6–12)
PO2 BLD: 379 MM[HG] (ref 0–500)
PO2 BLD: 446 MM[HG] (ref 0–500)
PO2 BLDA: 125 MM HG (ref 80–100)
PO2 BLDA: 189.5 MM HG (ref 80–100)
POTASSIUM BLDA-SCNC: 3.8 MMOL/L (ref 3.5–5)
POTASSIUM SERPL-SCNC: 4 MMOL/L (ref 3.5–5.2)
POTASSIUM SERPL-SCNC: 4 MMOL/L (ref 3.5–5.2)
PROCALCITONIN SERPL-MCNC: 0.53 NG/ML (ref 0–0.25)
PROT SERPL-MCNC: 6.5 G/DL (ref 6–8.5)
PROTHROMBIN TIME: 14.1 SECONDS (ref 11.8–14.9)
QT INTERVAL: 467 MS
QTC INTERVAL: 462 MS
RBC # BLD AUTO: 3.5 10*6/MM3 (ref 4.14–5.8)
RBC # BLD AUTO: 3.53 10*6/MM3 (ref 4.14–5.8)
READ BACK: YES
RESPIRATORY RATE: 24
SAO2 % BLDCOA: 97.8 % (ref 95–99)
SAO2 % BLDCOA: 99.6 % (ref 95–99)
SODIUM BLD-SCNC: 136 MMOL/L (ref 131–143)
SODIUM SERPL-SCNC: 131 MMOL/L (ref 136–145)
SODIUM SERPL-SCNC: 133 MMOL/L (ref 136–145)
TROPONIN T % DELTA: -5
TROPONIN T NUMERIC DELTA: -103 NG/L
TROPONIN T SERPL HS-MCNC: 1989 NG/L
VENTILATOR MODE: AC
VT ON VENT VENT: 400 ML
WBC NRBC COR # BLD AUTO: 4.86 10*3/MM3 (ref 3.4–10.8)
WBC NRBC COR # BLD AUTO: 5.25 10*3/MM3 (ref 3.4–10.8)

## 2025-01-17 PROCEDURE — 84484 ASSAY OF TROPONIN QUANT: CPT | Performed by: PHYSICIAN ASSISTANT

## 2025-01-17 PROCEDURE — 94668 MNPJ CHEST WALL SBSQ: CPT

## 2025-01-17 PROCEDURE — 93308 TTE F-UP OR LMTD: CPT | Performed by: INTERNAL MEDICINE

## 2025-01-17 PROCEDURE — 0BH17EZ INSERTION OF ENDOTRACHEAL AIRWAY INTO TRACHEA, VIA NATURAL OR ARTIFICIAL OPENING: ICD-10-PCS | Performed by: STUDENT IN AN ORGANIZED HEALTH CARE EDUCATION/TRAINING PROGRAM

## 2025-01-17 PROCEDURE — 85730 THROMBOPLASTIN TIME PARTIAL: CPT | Performed by: FAMILY MEDICINE

## 2025-01-17 PROCEDURE — 94799 UNLISTED PULMONARY SVC/PX: CPT

## 2025-01-17 PROCEDURE — 87070 CULTURE OTHR SPECIMN AEROBIC: CPT | Performed by: INTERNAL MEDICINE

## 2025-01-17 PROCEDURE — 85730 THROMBOPLASTIN TIME PARTIAL: CPT | Performed by: INTERNAL MEDICINE

## 2025-01-17 PROCEDURE — 82803 BLOOD GASES ANY COMBINATION: CPT

## 2025-01-17 PROCEDURE — 25010000002 HEPARIN (PORCINE) 25000-0.45 UT/250ML-% SOLUTION: Performed by: INTERNAL MEDICINE

## 2025-01-17 PROCEDURE — 94002 VENT MGMT INPAT INIT DAY: CPT

## 2025-01-17 PROCEDURE — 25810000003 LACTATED RINGERS SOLUTION: Performed by: PHYSICIAN ASSISTANT

## 2025-01-17 PROCEDURE — 5A1945Z RESPIRATORY VENTILATION, 24-96 CONSECUTIVE HOURS: ICD-10-PCS | Performed by: STUDENT IN AN ORGANIZED HEALTH CARE EDUCATION/TRAINING PROGRAM

## 2025-01-17 PROCEDURE — 25010000002 PROPOFOL 10 MG/ML EMULSION: Performed by: PHYSICIAN ASSISTANT

## 2025-01-17 PROCEDURE — 93308 TTE F-UP OR LMTD: CPT

## 2025-01-17 PROCEDURE — 25810000003 SODIUM CHLORIDE 0.9 % SOLUTION: Performed by: PHYSICIAN ASSISTANT

## 2025-01-17 PROCEDURE — 85610 PROTHROMBIN TIME: CPT | Performed by: INTERNAL MEDICINE

## 2025-01-17 PROCEDURE — 80053 COMPREHEN METABOLIC PANEL: CPT | Performed by: PHYSICIAN ASSISTANT

## 2025-01-17 PROCEDURE — 82948 REAGENT STRIP/BLOOD GLUCOSE: CPT

## 2025-01-17 PROCEDURE — 36600 WITHDRAWAL OF ARTERIAL BLOOD: CPT | Performed by: STUDENT IN AN ORGANIZED HEALTH CARE EDUCATION/TRAINING PROGRAM

## 2025-01-17 PROCEDURE — 84100 ASSAY OF PHOSPHORUS: CPT | Performed by: INTERNAL MEDICINE

## 2025-01-17 PROCEDURE — 99291 CRITICAL CARE FIRST HOUR: CPT | Performed by: INTERNAL MEDICINE

## 2025-01-17 PROCEDURE — 82803 BLOOD GASES ANY COMBINATION: CPT | Performed by: STUDENT IN AN ORGANIZED HEALTH CARE EDUCATION/TRAINING PROGRAM

## 2025-01-17 PROCEDURE — 71045 X-RAY EXAM CHEST 1 VIEW: CPT

## 2025-01-17 PROCEDURE — 93325 DOPPLER ECHO COLOR FLOW MAPG: CPT | Performed by: INTERNAL MEDICINE

## 2025-01-17 PROCEDURE — 85025 COMPLETE CBC W/AUTO DIFF WBC: CPT | Performed by: INTERNAL MEDICINE

## 2025-01-17 PROCEDURE — 93325 DOPPLER ECHO COLOR FLOW MAPG: CPT

## 2025-01-17 PROCEDURE — 85025 COMPLETE CBC W/AUTO DIFF WBC: CPT | Performed by: PHYSICIAN ASSISTANT

## 2025-01-17 PROCEDURE — 25010000002 VANCOMYCIN 5 G RECONSTITUTED SOLUTION: Performed by: PHYSICIAN ASSISTANT

## 2025-01-17 PROCEDURE — 80051 ELECTROLYTE PANEL: CPT

## 2025-01-17 PROCEDURE — 83735 ASSAY OF MAGNESIUM: CPT | Performed by: PHYSICIAN ASSISTANT

## 2025-01-17 PROCEDURE — 94664 DEMO&/EVAL PT USE INHALER: CPT

## 2025-01-17 PROCEDURE — 83605 ASSAY OF LACTIC ACID: CPT

## 2025-01-17 PROCEDURE — 83605 ASSAY OF LACTIC ACID: CPT | Performed by: PHYSICIAN ASSISTANT

## 2025-01-17 PROCEDURE — 87040 BLOOD CULTURE FOR BACTERIA: CPT | Performed by: PHYSICIAN ASSISTANT

## 2025-01-17 PROCEDURE — 84100 ASSAY OF PHOSPHORUS: CPT | Performed by: PHYSICIAN ASSISTANT

## 2025-01-17 PROCEDURE — 25010000002 PIPERACILLIN SOD-TAZOBACTAM PER 1 G: Performed by: PHYSICIAN ASSISTANT

## 2025-01-17 PROCEDURE — 36600 WITHDRAWAL OF ARTERIAL BLOOD: CPT

## 2025-01-17 PROCEDURE — 82948 REAGENT STRIP/BLOOD GLUCOSE: CPT | Performed by: PHYSICIAN ASSISTANT

## 2025-01-17 PROCEDURE — 25010000002 HYDROCORTISONE SOD SUC (PF) 100 MG RECONSTITUTED SOLUTION: Performed by: NURSE PRACTITIONER

## 2025-01-17 PROCEDURE — 87205 SMEAR GRAM STAIN: CPT | Performed by: INTERNAL MEDICINE

## 2025-01-17 PROCEDURE — 82330 ASSAY OF CALCIUM: CPT

## 2025-01-17 PROCEDURE — 82533 TOTAL CORTISOL: CPT | Performed by: INTERNAL MEDICINE

## 2025-01-17 PROCEDURE — 84145 PROCALCITONIN (PCT): CPT | Performed by: PHYSICIAN ASSISTANT

## 2025-01-17 PROCEDURE — 93005 ELECTROCARDIOGRAM TRACING: CPT | Performed by: STUDENT IN AN ORGANIZED HEALTH CARE EDUCATION/TRAINING PROGRAM

## 2025-01-17 RX ORDER — BISACODYL 5 MG/1
5 TABLET, DELAYED RELEASE ORAL DAILY PRN
Status: DISCONTINUED | OUTPATIENT
Start: 2025-01-17 | End: 2025-01-18

## 2025-01-17 RX ORDER — HEPARIN SODIUM 10000 [USP'U]/100ML
12 INJECTION, SOLUTION INTRAVENOUS
Status: DISCONTINUED | OUTPATIENT
Start: 2025-01-17 | End: 2025-01-19

## 2025-01-17 RX ORDER — BISACODYL 10 MG
10 SUPPOSITORY, RECTAL RECTAL DAILY PRN
Status: DISCONTINUED | OUTPATIENT
Start: 2025-01-17 | End: 2025-01-17

## 2025-01-17 RX ORDER — BISACODYL 5 MG/1
5 TABLET, DELAYED RELEASE ORAL DAILY PRN
Status: DISCONTINUED | OUTPATIENT
Start: 2025-01-17 | End: 2025-01-17

## 2025-01-17 RX ORDER — POLYETHYLENE GLYCOL 3350 17 G/17G
17 POWDER, FOR SOLUTION ORAL DAILY PRN
Status: DISCONTINUED | OUTPATIENT
Start: 2025-01-17 | End: 2025-01-17

## 2025-01-17 RX ORDER — GUAIFENESIN 200 MG/10ML
200 LIQUID ORAL EVERY 4 HOURS PRN
Status: DISCONTINUED | OUTPATIENT
Start: 2025-01-17 | End: 2025-01-24

## 2025-01-17 RX ORDER — NOREPINEPHRINE BITARTRATE 0.03 MG/ML
INJECTION, SOLUTION INTRAVENOUS
Status: COMPLETED
Start: 2025-01-17 | End: 2025-01-17

## 2025-01-17 RX ORDER — FENTANYL CITRATE-0.9 % NACL/PF 10 MCG/ML
50-300 PLASTIC BAG, INJECTION (ML) INTRAVENOUS
Status: DISCONTINUED | OUTPATIENT
Start: 2025-01-17 | End: 2025-01-18

## 2025-01-17 RX ORDER — POLYETHYLENE GLYCOL 3350 17 G/17G
17 POWDER, FOR SOLUTION ORAL DAILY PRN
Status: DISCONTINUED | OUTPATIENT
Start: 2025-01-17 | End: 2025-01-24

## 2025-01-17 RX ORDER — AMOXICILLIN 250 MG
2 CAPSULE ORAL 2 TIMES DAILY
Status: DISCONTINUED | OUTPATIENT
Start: 2025-01-17 | End: 2025-01-17

## 2025-01-17 RX ORDER — HYDROCORTISONE SODIUM SUCCINATE 100 MG/2ML
100 INJECTION INTRAMUSCULAR; INTRAVENOUS ONCE
Status: COMPLETED | OUTPATIENT
Start: 2025-01-17 | End: 2025-01-17

## 2025-01-17 RX ORDER — BISACODYL 10 MG
10 SUPPOSITORY, RECTAL RECTAL DAILY PRN
Status: DISCONTINUED | OUTPATIENT
Start: 2025-01-17 | End: 2025-01-24

## 2025-01-17 RX ORDER — NOREPINEPHRINE BITARTRATE 0.03 MG/ML
.02-.3 INJECTION, SOLUTION INTRAVENOUS
Status: DISCONTINUED | OUTPATIENT
Start: 2025-01-17 | End: 2025-01-18

## 2025-01-17 RX ORDER — HYDROCORTISONE SODIUM SUCCINATE 100 MG/2ML
50 INJECTION INTRAMUSCULAR; INTRAVENOUS EVERY 6 HOURS
Status: DISCONTINUED | OUTPATIENT
Start: 2025-01-17 | End: 2025-01-19

## 2025-01-17 RX ORDER — AMOXICILLIN 250 MG
2 CAPSULE ORAL 2 TIMES DAILY
Status: DISCONTINUED | OUTPATIENT
Start: 2025-01-17 | End: 2025-01-24

## 2025-01-17 RX ADMIN — PIPERACILLIN AND TAZOBACTAM 3.38 G: 3; .375 INJECTION, POWDER, FOR SOLUTION INTRAVENOUS at 23:33

## 2025-01-17 RX ADMIN — VANCOMYCIN HYDROCHLORIDE 1250 MG: 5 INJECTION, POWDER, LYOPHILIZED, FOR SOLUTION INTRAVENOUS at 02:28

## 2025-01-17 RX ADMIN — PROPOFOL 15 MCG/KG/MIN: 10 INJECTION, EMULSION INTRAVENOUS at 23:02

## 2025-01-17 RX ADMIN — PIPERACILLIN AND TAZOBACTAM 3.38 G: 3; .375 INJECTION, POWDER, FOR SOLUTION INTRAVENOUS at 04:11

## 2025-01-17 RX ADMIN — HYDROCORTISONE SODIUM SUCCINATE 50 MG: 100 INJECTION, POWDER, FOR SOLUTION INTRAMUSCULAR; INTRAVENOUS at 21:22

## 2025-01-17 RX ADMIN — WHITE PETROLATUM 57.7 %-MINERAL OIL 31.9 % EYE OINTMENT: at 09:53

## 2025-01-17 RX ADMIN — PROPOFOL 30 MCG/KG/MIN: 10 INJECTION, EMULSION INTRAVENOUS at 15:35

## 2025-01-17 RX ADMIN — SENNOSIDES AND DOCUSATE SODIUM 2 TABLET: 50; 8.6 TABLET ORAL at 21:20

## 2025-01-17 RX ADMIN — WHITE PETROLATUM 57.7 %-MINERAL OIL 31.9 % EYE OINTMENT: at 19:13

## 2025-01-17 RX ADMIN — PROPOFOL 50 MCG/KG/MIN: 10 INJECTION, EMULSION INTRAVENOUS at 05:47

## 2025-01-17 RX ADMIN — HYDROCORTISONE SODIUM SUCCINATE 50 MG: 100 INJECTION, POWDER, FOR SOLUTION INTRAMUSCULAR; INTRAVENOUS at 14:01

## 2025-01-17 RX ADMIN — ATORVASTATIN CALCIUM 40 MG: 40 TABLET, FILM COATED ORAL at 09:52

## 2025-01-17 RX ADMIN — IPRATROPIUM BROMIDE AND ALBUTEROL SULFATE 3 ML: 2.5; .5 SOLUTION RESPIRATORY (INHALATION) at 18:45

## 2025-01-17 RX ADMIN — OFLOXACIN 2 DROP: 3 SOLUTION OPHTHALMIC at 21:23

## 2025-01-17 RX ADMIN — LEVOTHYROXINE SODIUM 175 MCG: 75 TABLET ORAL at 05:46

## 2025-01-17 RX ADMIN — BUDESONIDE 0.5 MG: 0.5 INHALANT ORAL at 18:45

## 2025-01-17 RX ADMIN — QUETIAPINE FUMARATE 37.5 MG: 25 TABLET ORAL at 21:22

## 2025-01-17 RX ADMIN — HEPARIN SODIUM 12 UNITS/KG/HR: 10000 INJECTION, SOLUTION INTRAVENOUS at 07:52

## 2025-01-17 RX ADMIN — BUDESONIDE 0.5 MG: 0.5 INHALANT ORAL at 06:39

## 2025-01-17 RX ADMIN — Medication 50 MCG/HR: at 03:18

## 2025-01-17 RX ADMIN — Medication 10 ML: at 21:27

## 2025-01-17 RX ADMIN — WHITE PETROLATUM 57.7 %-MINERAL OIL 31.9 % EYE OINTMENT: at 14:22

## 2025-01-17 RX ADMIN — IPRATROPIUM BROMIDE AND ALBUTEROL SULFATE 3 ML: 2.5; .5 SOLUTION RESPIRATORY (INHALATION) at 06:39

## 2025-01-17 RX ADMIN — OFLOXACIN 2 DROP: 3 SOLUTION OPHTHALMIC at 09:53

## 2025-01-17 RX ADMIN — FAMOTIDINE 10 MG: 10 INJECTION INTRAVENOUS at 09:51

## 2025-01-17 RX ADMIN — Medication 10 ML: at 21:32

## 2025-01-17 RX ADMIN — Medication 10 ML: at 09:52

## 2025-01-17 RX ADMIN — OFLOXACIN 2 DROP: 3 SOLUTION OPHTHALMIC at 14:22

## 2025-01-17 RX ADMIN — WHITE PETROLATUM 57.7 %-MINERAL OIL 31.9 % EYE OINTMENT: at 21:23

## 2025-01-17 RX ADMIN — HYDROCORTISONE SODIUM SUCCINATE 100 MG: 100 INJECTION, POWDER, FOR SOLUTION INTRAMUSCULAR; INTRAVENOUS at 09:51

## 2025-01-17 RX ADMIN — NOREPINEPHRINE BITARTRATE 0.04 MCG/KG/MIN: 0.03 INJECTION, SOLUTION INTRAVENOUS at 02:27

## 2025-01-17 RX ADMIN — SODIUM CHLORIDE, POTASSIUM CHLORIDE, SODIUM LACTATE AND CALCIUM CHLORIDE 500 ML: 600; 310; 30; 20 INJECTION, SOLUTION INTRAVENOUS at 02:27

## 2025-01-17 RX ADMIN — SENNOSIDES AND DOCUSATE SODIUM 2 TABLET: 50; 8.6 TABLET ORAL at 09:52

## 2025-01-17 RX ADMIN — PROPOFOL 10 MCG/KG/MIN: 10 INJECTION, EMULSION INTRAVENOUS at 02:27

## 2025-01-17 RX ADMIN — OFLOXACIN 2 DROP: 3 SOLUTION OPHTHALMIC at 19:14

## 2025-01-17 RX ADMIN — PIPERACILLIN AND TAZOBACTAM 3.38 G: 3; .375 INJECTION, POWDER, FOR SOLUTION INTRAVENOUS at 14:02

## 2025-01-17 RX ADMIN — ARFORMOTEROL TARTRATE 15 MCG: 15 SOLUTION RESPIRATORY (INHALATION) at 06:39

## 2025-01-17 RX ADMIN — ARFORMOTEROL TARTRATE 15 MCG: 15 SOLUTION RESPIRATORY (INHALATION) at 18:45

## 2025-01-17 NOTE — PROGRESS NOTES
[facility]   Hospitalist Progress Note    Date of admission: 1/11/2025  Patient Name: Nelson Wang  1945  Date: 1/17/2025      Subjective     No chief complaint on file.      Interval Followup: Had worsening respiratory status last night with agonal breathing hide ABG with hypercarbia was placed on BiPAP and moved to the ICU and at some point developed hypotension ultimately was intubated after discussion with patient's daughter last night.  Goals of care discussion today going to continue on ventilator, SBT in a.m. depending on how patient does at that time we will consider possible palliative extubation depending on how he does at that time.  Patient remains intubated but otherwise will remain with DNR do not reintubate status currently      Objective     Vitals:   Temp:  [97.8 °F (36.6 °C)-98.6 °F (37 °C)] 97.8 °F (36.6 °C)  Heart Rate:  [54-82] 82  Resp:  [4-40] 20  BP: ()/(37-90) 168/71  Flow (L/min) (Oxygen Therapy):  [2] 2  FiO2 (%):  [46 %-51 %] 46 %    Physical Exam  Ill-appearing in bed thin frail  Coarse ventilator breath sounds, symmetric chest rise  Rrr No lower extremity pitting edema  Abd nondistended      Result Review:  Vital signs, labs and recent relevant imaging reviewed.      CBC          1/14/2025    03:18 1/15/2025    04:33 1/17/2025    02:31 1/17/2025    06:38   CBC   WBC 6.58  5.22  5.25  4.86    RBC 3.71  3.65  3.50  3.53    Hemoglobin 9.9  9.7  9.4  9.4    Hematocrit 34.6  33.2  32.5  32.9    MCV 93.3  91.0  92.9  93.2    MCH 26.7  26.6  26.9  26.6    MCHC 28.6  29.2  28.9  28.6    RDW 18.6  18.6  18.7  18.7    Platelets 187  177  159  141      CMP          1/14/2025    03:18 1/15/2025    04:33 1/17/2025    02:31 1/17/2025    03:49   CMP   Glucose 153  119  93  98    BUN 14  21  15  17    Creatinine 3.01  4.57  4.00  3.91    EGFR 20.5  12.4  14.6  15.0    Sodium 134  132  133  131    Potassium 4.2  4.1  4.0  4.0    Chloride 99  96  97  96    Calcium 9.2  9.7  9.6  9.4     Total Protein 7.1  7.0  6.5     Albumin 3.0  3.0  2.5  2.6    Globulin 4.1  4.0  4.0     Total Bilirubin 0.6  0.6  0.6     Alkaline Phosphatase 114  114  118     AST (SGOT) 41  35  29     ALT (SGPT) 11  10  9     Albumin/Globulin Ratio 0.7  0.8  0.6     BUN/Creatinine Ratio 4.7  4.6  3.8  4.3    Anion Gap 10.3  12.2  10.0  12.9          acetaminophen    albumin human    senna-docusate sodium **AND** polyethylene glycol **AND** bisacodyl **AND** bisacodyl    calcium carbonate    dextrose    dextrose    Diclofenac Sodium    glucagon (human recombinant)    guaifenesin    heparin    heparin    ipratropium-albuterol    Lidocaine    midodrine    ondansetron ODT    Pharmacy to dose vancomycin    polyethyl glycol-propyl glycol    QUEtiapine    senna-docusate sodium **AND** [DISCONTINUED] polyethylene glycol **AND** [DISCONTINUED] bisacodyl **AND** [DISCONTINUED] bisacodyl    sodium chloride    sodium chloride    sodium chloride    sodium chloride    [Held by provider] acetaminophen, 1,000 mg, Oral, Q8H  arformoterol, 15 mcg, Nebulization, BID - RT  artificial tears, , Both Eyes, 4x Daily  atorvastatin, 40 mg, Nasogastric, Daily  budesonide, 0.5 mg, Nebulization, BID - RT  famotidine, 10 mg, Intravenous, Daily  [Held by provider] famotidine, 10 mg, Oral, Daily  hydrocortisone sodium succinate, 50 mg, Intravenous, Q6H  ipratropium-albuterol, 3 mL, Nebulization, BID - RT  levothyroxine, 175 mcg, Nasogastric, Q AM  ofloxacin, 2 drop, Left Eye, 4x Daily  piperacillin-tazobactam, 3.375 g, Intravenous, Q12H  QUEtiapine, 37.5 mg, Nasogastric, Nightly  senna-docusate sodium, 2 tablet, Nasogastric, BID  [Held by provider] sevelamer, 800 mg, Oral, TID With Meals  sodium chloride, 10 mL, Intravenous, Q12H  sodium chloride, 10 mL, Intravenous, Q12H        XR Chest 1 View    Result Date: 1/17/2025  The study is ABNORMAL. The endotracheal (ET) tube tip is in the expected right mainstem bronchus. Consider retracting the endotracheal (ET)  tube approximately 2 to 3 cm in order to optimize its positioning relative to the mel. Please see above comments for further detail.   Portions of this note were completed with a voice recognition program.  Electronically Signed ByCorie Hogan MD On:1/17/2025 2:36 AM      EEG    Result Date: 1/13/2025  Underlying background suggest diffuse cerebral dysfunction of moderate degree, most commonly seen due to toxic/metabolic cause The triphasic sharp waves are abnormal but nonspecific.  These are most commonly seen due to significant toxic/metabolic disarray, most notably liver or kidney dysfunction.  They may also be seen as a side effect of certain medications such as cefepime.  Less likely, they can also be found on the ictal/interictal spectrum This report is transcribed using the Dragon dictation system.      XR Chest 1 View    Result Date: 1/12/2025  Increased bilateral infiltrates are seen. The findings may represent infectious multifocal pneumonia. Aspiration pneumonia cannot be excluded. Pulmonary edema is possible. A combination of these differential considerations may exist. Please see above comments for further detail.    Portions of this note were completed with a voice recognition program.  Electronically Signed ByCorie Hogan MD On:1/12/2025 11:24 PM      CT Head Without Contrast Stroke Protocol    Result Date: 1/12/2025  Impression: Stable examination without acute intracranial process. Electronically Signed: Margaret Hammond MD  1/12/2025 10:57 AM EST  Workstation ID: [id number]    CT Pelvis Without Contrast    Result Date: 1/11/2025  There is a suspected acute-to-subacute nondisplaced periprosthetic fracture involving the proximal right femur, as discussed. No other acute fractures are seen. No dislocation.   Portions of this note were completed with a voice recognition program.  Electronically Signed ByCorie Hogan MD On:1/11/2025 9:15 PM       Assessment / Plan     Summary: 79 y.o. ESRD on  dialysis, type 2 diabetes, dementia, discitis having completed vancomycin, A-fib, restrictive lung disease.  Patient recently discharged following long hospitalization, admitted from 12/5/2024 through 1/8/2025.  Patient discharged home with in-home dialysis.  Unfortunately today patient's dialysis machine not working.  Patient's primary nephrologist was contacted, unable to get patient into other outpatient dialysis facilities.  Therefore, decision was made to direct admit patient into the hospital for dialysis as he has not had dialysis for 3 days.  Hospitalist service contacted for admission orders, nephrology consulted for dialysis needs.      RRT called morning of 12th.  Patient was found to have fixed gaze to the right.  Patient only responding to painful stimuli minimally.  Stat CT obtained showing stable examination without acute intracranial process.  Teleneurology consulted during stroke RRT.  Felt altered mental status and confusion most likely secondary to toxic or metabolic etiology.  During stroke RRT patient noted to be hypotensive, while fluid bolus running, daughter endorses improvement in physical exam.  Patient was transferred to a telemetry bed, blood pressures again started drifting down therefore felt safest to transfer patient to the ICU for pressors if needed.     Patient's CT scan of pelvis does reveal right periprosthetic proximal femur fracture.  Orthopedic surgery consulted and able to talk with patient and daughter at bedside.  At this time plan is for conservative measures, continue nonoperative treatment with toe-touch as able, continue to work with PT and OT and follow-up in clinic clos.  Had worsening respiratory status and hypercapnia and was intubated overnight on 1/16.  Continued goals of care discussion    Assessment/Plan (clinically significant if listed here)  Encephalopathy  End-stage renal disease on dialysis  Hypotension  Type 2 diabetes  Acute to subacute nondisplaced  periprosthetic fracture involving proximal right femur  Dementia  Discitis, completed course of vancomycin  Atrial fibrillation not on anticoagulation due to falls  Restrictive lung disease    EEG - IMPRESSION:   Underlying background suggest diffuse cerebral dysfunction of moderate degree, most commonly seen due to toxic/metabolic cause   The triphasic sharp waves are abnormal but nonspecific.  These are most commonly seen due to significant toxic/metabolic disarray, most notably liver or kidney dysfunction.  They may also be seen as a side effect of certain medications such as cefepime.  Less likely, they can also be found on the ictal/interictal spectrum    Ct head - showed white matter changes and atrophy without acute infarct     Continue mechanical ventilation and monitor respiratory status, SBT in the morning and depending on how clinical course progresses we will discuss further goals of care at that time and possible palliative extubation  Continue supportive care for patient and family otherwise  Notable elevation in troponins had a relatively clean heart cath 5/2024, echo from a few days ago was unremarkable, hypotension episode likely partially contributing, random cortisol level is low placing on high-dose steroids in the meantime  Treating with heparin ACS given troponinemia for now, may be poor candidate for cardiac intervention treating medically at this time and did have relatively clean cath previously  Checking repeat limited echo to see if any new acute change compared to recent episode  Placing on empiric antibiotics, iv zosyn, for now, follow-up cultures narrow as able  Continue dialysis as able per nephrology  Continue tube feeds.  Core track.    Continue as needed pain medication, Nonoperative management of fracture per Ortho, monitor periodically with imaging outpatient, appreciate assistance - wbat, toe-touch as tolerated   Cont seroquel at night   Cont synthroid  Cont statin  Dnr/dni, cont  goc discussions and supportive care/palliative care discussions  Remains critically ill in the ICU, unfortunately with guarded long-term prognosis.  F/u pending tests and additional lab monitoring, with cbc, bmp, mg, phos, ptt    Dispo: home with family/caregivers once medically stable.   D/w SW    VTE Prophylaxis:  Pharmacologic & mechanical VTE prophylaxis orders are present.      Code Status (Patient has no pulse and is not breathing): No CPR (Do Not Attempt to Resuscitate)  Medical Interventions (Patient has pulse or is breathing): Full Support      Patient is critically ill due to ahhrf requiring intubation, troponemia and concern for possible acs vs hypotension induced episode, and additional issues as noted above.  I have spent 32 minutes of critical care time reviewing documentation, pertinent labs, imaging studies, examining the patient, modifying care plan, and discussing patient's condition/goc and care plan with the pt's family, rn, pulmcrit, nephrology, sw

## 2025-01-17 NOTE — PROGRESS NOTES
LOS: 6 days   Patient Care Team:  Domingo Bravo MD as PCP - General (Internal Medicine)  Josephine Warren APRN as Nurse Practitioner (Pulmonary Disease)  Virginie Lopez APRN as Nurse Practitioner (Pulmonary Disease)    Chief Complaint:  Renal issues    Subjective     Events noted.  Bradypnea noted yesterday, patient intubated and transferred to ICU.  I saw him this morning and then again this afternoon.  Discussed with the wife and daughter-in-law.  Was hypotensive, required pressors but off Levophed since.  He    [Held by provider] acetaminophen, 1,000 mg, Oral, Q8H  arformoterol, 15 mcg, Nebulization, BID - RT  artificial tears, , Both Eyes, 4x Daily  atorvastatin, 40 mg, Nasogastric, Daily  budesonide, 0.5 mg, Nebulization, BID - RT  famotidine, 10 mg, Intravenous, Daily  [Held by provider] famotidine, 10 mg, Oral, Daily  hydrocortisone sodium succinate, 50 mg, Intravenous, Q6H  ipratropium-albuterol, 3 mL, Nebulization, BID - RT  levothyroxine, 175 mcg, Nasogastric, Q AM  ofloxacin, 2 drop, Left Eye, 4x Daily  piperacillin-tazobactam, 3.375 g, Intravenous, Q12H  QUEtiapine, 37.5 mg, Nasogastric, Nightly  senna-docusate sodium, 2 tablet, Nasogastric, BID  [Held by provider] sevelamer, 800 mg, Oral, TID With Meals  sodium chloride, 10 mL, Intravenous, Q12H  sodium chloride, 10 mL, Intravenous, Q12H      fentanyl 10 mcg/mL,  mcg/hr, Last Rate: 75 mcg/hr (01/17/25 0730)  heparin, 12 Units/kg/hr, Last Rate: Stopped (01/17/25 1520)  norepinephrine, 0.02-0.3 mcg/kg/min, Last Rate: Stopped (01/17/25 1000)  Pharmacy to dose vancomycin,   propofol, 5-50 mcg/kg/min, Last Rate: 30 mcg/kg/min (01/17/25 1535)        Review of Systems:   Unable  Objective     Vital Signs  Temp:  [97.8 °F (36.6 °C)-98.6 °F (37 °C)] 97.8 °F (36.6 °C)  Heart Rate:  [54-82] 82  Resp:  [4-40] 20  BP: ()/(37-90) 168/71  FiO2 (%):  [46 %-51 %] 46 %    Intake/Output Summary (Last 24 hours) at 1/17/2025 5538  Last data filed at  "1/17/2025 1350  Gross per 24 hour   Intake 1291.3 ml   Output 500 ml   Net 791.3 ml             Physical Exam:     General Appearance:    Poorly responsive,  in no acute distress, orally intubated, on vent..   Head:    Normocephalic, without obvious abnormality, atraumatic   Throat:    Oral mucosa appear dry   Neck:   No adenopathy, supple, no JVD       Lungs:     Clear to auscultation,respirations unlabored, no wheezes or rhonchi    Heart:    Regular rate and rhythm , normal S1 and S2, no            murmur, no gallop, no rub   Abdomen:     Normal bowel sounds, no masses, soft non-tender   Extremities:    No lower extremity edema, no cyanosis, L arm fistula is patent.       Skin:   Dry, No rash.  Skin discoloration from psoriasis noted.                  Results Review:    Results from last 7 days   Lab Units 01/17/25  0349 01/17/25  0231 01/15/25  0433   SODIUM mmol/L 131* 133* 132*   POTASSIUM mmol/L 4.0 4.0 4.1   CHLORIDE mmol/L 96* 97* 96*   CO2 mmol/L 22.1 26.0 23.8   BUN mg/dL 17 15 21   CREATININE mg/dL 3.91* 4.00* 4.57*   GLUCOSE mg/dL 98 93 119*   CALCIUM mg/dL 9.4 9.6 9.7     Results from last 7 days   Lab Units 01/17/25  0638 01/17/25  0231 01/15/25  0433   WBC 10*3/mm3 4.86 5.25 5.22   HEMOGLOBIN g/dL 9.4* 9.4* 9.7*   HEMATOCRIT % 32.9* 32.5* 33.2*   PLATELETS 10*3/mm3 141 159 177     Magnesium   Date Value Ref Range Status   01/17/2025 2.0 1.6 - 2.4 mg/dL Final   01/15/2025 2.2 1.6 - 2.4 mg/dL Final     Phosphorus   Date Value Ref Range Status   01/17/2025 4.6 (H) 2.5 - 4.5 mg/dL Final   01/17/2025 5.2 (H) 2.5 - 4.5 mg/dL Final   01/15/2025 4.0 2.5 - 4.5 mg/dL Final     No results found for: \"BNP\"  Lab Results   Component Value Date    ALBUMIN 2.6 (L) 01/17/2025      Brief Urine Lab Results  (Last result in the past 365 days)        Color   Clarity   Blood   Leuk Est   Nitrite   Protein   CREAT   Urine HCG        10/27/24 1417 Yellow   Cloudy   Negative   Trace   Negative   >=300 mg/dL (3+)             "        XR Chest 1 View    Result Date: 1/17/2025  XR CHEST 1 VW-  Date of exam: 1/17/2025, 2:15 A.M.  Comparison: 1/12/2025.  INDICATIONS: Respiratory failure; j96.11-chronic respiratory failure with hypoxia; j18.9-pneumonia, unspecified organism; j96.21-acute and chronic respiratory failure with hypoxia; n18.6-end stage renal disease; j69.0-pneumonitis due to inhalation of food and vomit.  FINDINGS: A single AP supine portable chest radiograph was performed. There has been interval endotracheal (ET) intubation. The distal endotracheal (ET) tube tip is at the orifice of the right mainstem bronchus. Consider retracting the endotracheal (ET) tube approximately 2 to 3 cm in order to optimize its positioning relative to the mel. The distal nasoenteral feeding tube is projected below the diaphragm. It is a new finding since the prior study. Its distal tip is partially imaged in the expected location of the distal gastric lumen. Bilateral infiltrates persist, greater on the right than the left. Overall, they are thought to be decreased in degree since the prior exam. Small pleural effusions are suggested. There may be mild cardiac enlargement. No pneumothorax is seen. The thoracic aorta is atherosclerotic. External artifacts obscure detail.       Impression: The study is ABNORMAL. The endotracheal (ET) tube tip is in the expected right mainstem bronchus. Consider retracting the endotracheal (ET) tube approximately 2 to 3 cm in order to optimize its positioning relative to the mel. Please see above comments for further detail.   Portions of this note were completed with a voice recognition program.  Electronically Signed By-Stan Hogan MD On:1/17/2025 2:36 AM            Assessment & Plan       ESRD (end stage renal disease) on dialysis    Moderate protein-calorie malnutrition    - ESRD, was on home dialysis prior to admission, has left upper extremity AV fistula for access.  Electrolytes are stable.  Dialysis  m/w/f.  Ultrafiltration 0.5 kg today as tolerated.  Discussed with dialysis staff.  I checked random cortisol level came back at 1.5.  Patient now is empirically on hydrocortisone.  When I came back this afternoon to see him he is hypertensive.      - Type 2 diabetes with complications.     - Hypotension blood pressure is on the high side now.,  Maybe he was adrenally insufficient.  Can possibly stop ProAmatine if continues to be hypertensive.    - Anemia of chronic kidney disease, close to goal     - Altered mentation.  Delirium, was getting slowly better, but no intubated on the vent.    _R femur FX- ortho seeing    Overall prognosis is poor.  Discussed with family.  Discussed with ICU team.    Please call if any questions  [phone]

## 2025-01-17 NOTE — PROCEDURES
Notified that patient is obtunded.  ABG showed 7.21/75/125 on 2 L nasal cannula.  Examined patient, noted to have shallow breathing with respiratory rate of 6-8 breaths/min, started on BiPAP, initial tidal volumes were low but later improved.  Upon coming to the ICU, patient noted to be hypotensive requiring Levophed.  Continue to have intermittently low tidal volumes.  Patient intubated for acute hypercapnic respiratory failure.  Hypotension could be likely due to decreased venous return from increased intrathoracic pressure while on BiPAP.  Obtaining labs and started on broad-spectrum antibiotic coverage with Zosyn and vancomycin, de-escalate antibiotics based on the cultures and labs.      Endotracheal Intubation Procedure Note    Indication for endotracheal intubation: respiratory failure.  Sedation: etomidate.  Paralytic: none.  Lidocaine: no.  Atropine: no.  Equipment: Florina 4 laryngoscope blade and 8.0mm cuffed endotracheal tube.  Cricoid Pressure: no.  Number of attempts: 1.  ETT location confirmed by by auscultation, by CXR, and ETCO2 monitor.

## 2025-01-17 NOTE — NURSING NOTE
Pt completed full 3 hour dialysis treatment without dialysis related complications. 57 BLP and 0.5L removed.    Pt blood pressure was stable throughout treatment and was off of blood pressure support during most of treatment. Pt was intubated and sedated for duration of treatment.     Pt was only responsive to painful stimuli intermittently. Breath sounds clear diminished throughout. No edema present.    Family has determined to assess pt breathing tomorrow morning via breathing trial to determine if patient will be able to go home with family.    Report given to Roro MARADIAGA. All questions addressed at this time.    Dr. Castro to the room to assess patient during dialysis treatment.    Pt wife, daughter, and niece at the bedside at departure.

## 2025-01-17 NOTE — PROGRESS NOTES
"[facility] Clinical Pharmacy Services: Vancomycin Pharmacokinetic Initial Consult Note    Nelson aWng is a 79 y.o. male who is on day 1 of pharmacy to dose vancomycin for Empiric.    Consult Information  Consulting Provider: Eduardo  Planned Duration of Therapy: 7 days  Was Patient Receiving Prior to Admission/Consult?: No  Loading Dose Ordered or Given: 1250 mg on 25 at 0228  PK/PD Target: -600 mg/L.hr   Relevant ID History:     Staph spp, not aureus or lugdunensis. Identification by BCID2 PCR. Abnormal  24   BCID, PCR 2 Streptococcus spp, not A, B, or pneumoniae. Identification by BCID2 PCR. Abnormal         Results for orders placed or performed during the hospital encounter of 25   Blood Culture - Blood, Hand, Right    Specimen: Hand, Right; Blood   Result Value Ref Range    Blood Culture No growth at 4 days       Other Antimicrobials: Piperacillin/Tazobactam    Imaging Reviewed?: Yes    Microbiology Data  MRSA PCR performed: No; Result: Not ordered due to excluded indication or presence of suspected abscess  Culture/Source:       Vitals/Labs  Ht: 162.5 cm (63.98\"); Wt:    Temp (24hrs), Av.1 °F (36.7 °C), Min:97.9 °F (36.6 °C), Max:98.6 °F (37 °C)   Estimated Creatinine Clearance: 13.9 mL/min (A) (by C-G formula based on SCr of 4 mg/dL (H)).  Hemodialysis, schedule to be determined     Results from last 7 days   Lab Units 25  0231 01/15/25  0433 25  0318   CREATININE mg/dL 4.00* 4.57* 3.01*   WBC 10*3/mm3 5.25 5.22 6.58     Assessment/Plan:    Vancomycin Dose: pulse dosing with 1250 mg IV once on 25 at 0228  Vanc Random ordered for 25 at 1500  Patient has order for Renal Function Panel    Pharmacy will follow patient's kidney function and will adjust doses and obtain levels as necessary. Thank you for involving pharmacy in this patient's care. Please contact pharmacy with any questions or concerns.                           Bhavin Jerry RPH  Clinical " Pharmacist

## 2025-01-17 NOTE — PLAN OF CARE
Goal Outcome Evaluation:   Patient experienced increased need for respiratory support due to bradypnea and decreased responsiveness. Ultimately intubated. Patient currently on AC/VC Vt 400, f 20, +5, 50% FiO2.    Problem: Mechanical Ventilation Invasive  Goal: Effective Communication  Outcome: Progressing  Intervention: Ensure Effective Communication  Flowsheets (Taken 1/17/2025 0509)  Trust Relationship/Rapport: care explained  Goal: Optimal Device Function  Outcome: Progressing  Intervention: Optimize Device Care and Function  Flowsheets  Taken 1/17/2025 0509 by Candice Mota, NANCY  Airway/Ventilation Management:   airway patency maintained   calming measures promoted   positive pressure ventilation provided   oxygen therapy provided  Taken 1/17/2025 0239 by Amanda Nassar, RN  Oral Care:   suction provided   swabbed with antiseptic solution  Airway Safety Measures:   suction at bedside   mask valve resuscitator at bedside  Goal: Mechanical Ventilation Liberation  Outcome: Progressing  Intervention: Promote Extubation and Mechanical Ventilation Liberation  Flowsheets (Taken 1/17/2025 0509)  Environmental Support: calm environment promoted  Sleep/Rest Enhancement:   awakenings minimized   room darkened   noise level reduced  Goal: Absence of Device-Related Skin and Tissue Injury  Outcome: Progressing  Intervention: Maintain Skin and Tissue Health  Flowsheets (Taken 1/17/2025 0410 by Mariola Lawrence, CRT)  Device Skin Pressure Protection:   tubing/devices free from skin contact   skin-to-device areas padded   pressure points protected   adhesive use limited  Goal: Absence of Ventilator-Induced Lung Injury  Outcome: Progressing  Intervention: Facilitate Lung-Protection Measures  Flowsheets (Taken 1/17/2025 0509)  Lung Protection Measures:   plateau/inspiratory pressure monitored   ventilator synchrony promoted   ventilator waveforms monitored   lung compliance monitored  Intervention: Prevent Ventilator-Associated  Pneumonia  Flowsheets  Taken 1/17/2025 0509 by Candice Mota, NANCY  VAP Prevention Bundle: vent circuit breaks minimized  Taken 1/17/2025 0239 by Amanda Nassar, RN  Head of Bed (HOB) Positioning: HOB at 30-45 degrees  VAP Prevention Measures: completed

## 2025-01-17 NOTE — CONSULTS
"Nutrition Services    Patient Name: Nelson Wang  YOB: 1945  MRN: [id number]  Admission date: 1/11/2025      CLINICAL NUTRITION ASSESSMENT      Reason for Assessment  Follow Up   H&P:  Past Medical History:   Diagnosis Date    Abnormal stress test     Anemia     IRON INFUSIONS WITH DIALYSIS    Anesthesia     WITH HIP REPLACEMENT DAUGHTER BELIEVES HAD SVT 2000    Ankle pain, right     Anxiety and depression     Arthritis     CKD (chronic kidney disease), stage IV     DIALYSIS [id number] SHILEY IN RIGHT CHEST    Diabetes     GERD (gastroesophageal reflux disease)     Gout     High cholesterol     History of peritoneal dialysis     HL (hearing loss)     Hyperkalemia     Hypertension     Hypothyroidism     Insomnia     Night terrors     Psoriasis     Psoriasis     Sleep apnea     Sleep walking     Thrombocytopenia     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    Vitiligo         Current Problems:   Active Hospital Problems    Diagnosis     **ESRD (end stage renal disease) on dialysis     Moderate protein-calorie malnutrition         Nutrition/Diet History         Narrative   Patient transferred to ICU and intubated early this AM. TF ordered yesterday. Place on hold for intubation. Discussed during rounds this AM, provider okay to restart EN today. Cortrak in place. Will modify EN order to reflect critical care guidelines.      Anthropometrics        Current Height, Weight Height: 162.5 cm (63.98\")      Current BMI Body mass index is 24.39 kg/m².   BMI Classification Normal range   % %   Adjusted Body Weight (ABW) N/A   Weight Hx  Wt Readings from Last 30 Encounters:   01/08/25 0516 64.4 kg (141 lb 15.6 oz)   01/07/25 0500 66.6 kg (146 lb 13.2 oz)   01/06/25 0500 64.3 kg (141 lb 12.1 oz)   01/05/25 0500 65.1 kg (143 lb 8.3 oz)   01/04/25 0521 64 kg (141 lb 1.5 oz)   01/02/25 0417 64.4 kg (141 lb 15.6 oz)   01/01/25 0600 62 kg (136 lb 11 oz)   12/31/24 0600 62.7 kg (138 lb 3.7 oz)   12/30/24 " 0917 63.2 kg (139 lb 5.3 oz)   12/29/24 0500 61.8 kg (136 lb 3.9 oz)   12/28/24 0635 63 kg (138 lb 14.2 oz)   12/27/24 0541 63.1 kg (139 lb 1.8 oz)   12/26/24 0738 63.1 kg (139 lb 3.2 oz)   12/25/24 0333 67 kg (147 lb 11.3 oz)   12/24/24 0530 67.9 kg (149 lb 11.1 oz)   12/23/24 0418 68.1 kg (150 lb 2.1 oz)   12/22/24 0525 68.6 kg (151 lb 3.8 oz)   12/21/24 0500 75.4 kg (166 lb 3.6 oz)   12/20/24 0350 67.4 kg (148 lb 9.4 oz)   12/18/24 2009 65.3 kg (143 lb 15.4 oz)   12/18/24 0300 67 kg (147 lb 11.3 oz)   12/17/24 0440 65.8 kg (145 lb 1 oz)   12/16/24 0547 64.3 kg (141 lb 12.1 oz)   12/13/24 0407 62.5 kg (137 lb 12.6 oz)   12/12/24 0500 69.3 kg (152 lb 12.5 oz)   12/09/24 0600 70.2 kg (154 lb 12.2 oz)   12/06/24 1228 70.7 kg (155 lb 13.8 oz)   12/05/24 1850 75.3 kg (166 lb 0.1 oz)   12/01/24 1701 71 kg (156 lb 8.4 oz)   11/27/24 1247 71.6 kg (157 lb 12.8 oz)   11/22/24 1311 71.8 kg (158 lb 3.2 oz)   11/18/24 0351 67.9 kg (149 lb 11.1 oz)   11/17/24 0255 71.9 kg (158 lb 8.2 oz)   11/16/24 0316 67.9 kg (149 lb 11.1 oz)   11/14/24 0611 73.9 kg (162 lb 14.7 oz)   11/13/24 0500 73 kg (161 lb)   11/12/24 0523 73.2 kg (161 lb 6 oz)   11/11/24 1200 72.8 kg (160 lb 7.9 oz)   11/10/24 0500 74.4 kg (164 lb 0.4 oz)   11/08/24 0640 73 kg (160 lb 14.4 oz)   11/07/24 0621 72.7 kg (160 lb 3.2 oz)   11/05/24 0600 68.9 kg (151 lb 14.4 oz)   11/04/24 1402 67 kg (147 lb 11.3 oz)   11/04/24 0410 67.9 kg (149 lb 11.1 oz)   11/01/24 0159 74.3 kg (163 lb 12.8 oz)   10/27/24 0700 72.5 kg (159 lb 13.3 oz)   10/27/24 0346 75.7 kg (166 lb 14.2 oz)   10/09/24 1402 75.7 kg (166 lb 12.8 oz)   10/08/24 1455 75.3 kg (166 lb 0.1 oz)   10/02/24 0812 77.5 kg (170 lb 13.7 oz)   09/28/24 0330 77.2 kg (170 lb 3.1 oz)   09/27/24 1659 76.2 kg (167 lb 15.9 oz)   09/26/24 0823 78.9 kg (174 lb)   09/16/24 1814 79.3 kg (174 lb 13.2 oz)   09/12/24 1344 77.1 kg (169 lb 15.6 oz)   08/30/24 0927 75.6 kg (166 lb 10.7 oz)   08/14/24 0922 76.7 kg (169 lb)   07/17/24  1116 77.2 kg (170 lb 3.2 oz)   07/16/24 0817 73.8 kg (162 lb 11.2 oz)   07/10/24 0740 73.5 kg (162 lb)   06/17/24 1604 74.6 kg (164 lb 6.4 oz)   06/10/24 1348 80 kg (176 lb 5.9 oz)   06/06/24 1423 72 kg (158 lb 11.2 oz)   05/29/24 0720 72.9 kg (160 lb 11.5 oz)   05/28/24 1015 74.5 kg (164 lb 3.9 oz)   05/24/24 0917 74.6 kg (164 lb 6.4 oz)   05/03/24 1100 73 kg (160 lb 14.4 oz)   05/03/24 0838 74.6 kg (164 lb 6.4 oz)   05/01/24 0847 73.5 kg (162 lb)   04/24/24 1105 73.8 kg (162 lb 9.6 oz)   04/19/24 1300 74.5 kg (164 lb 3.2 oz)   04/14/24 1735 71.7 kg (158 lb 1.1 oz)   04/08/24 2128 78 kg (171 lb 15.3 oz)   03/24/24 0526 78 kg (171 lb 15.3 oz)          Wt Change Observation UTD, fluctuations noted related to fluids. Pt with ESRD on HD. Overall trend down, -18% x 10 mo      Estimated/Assessed Needs  Estimated Needs based on: Critical Care Guidelines       Energy Requirements 12-25 kcal/kg   EST Needs (kcal/day) 768-1600       Protein Requirements 1.2-2.0 g/kg   EST Daily Needs (g/day)        Fluid Requirements 25 ml/kg    Estimated Needs (mL/day) 1600     Labs/Medications Hyponatremia- will provide water flushes accordingly  Fentanyl, Levo, Propofol, Heparin        Pertinent Labs Reviewed.   Results from last 7 days   Lab Units 01/17/25  0349 01/17/25  0231 01/15/25  0433 01/14/25  0318   SODIUM mmol/L 131* 133* 132* 134*   POTASSIUM mmol/L 4.0 4.0 4.1 4.2   CHLORIDE mmol/L 96* 97* 96* 99   CO2 mmol/L 22.1 26.0 23.8 24.7   BUN mg/dL 17 15 21 14   CREATININE mg/dL 3.91* 4.00* 4.57* 3.01*   CALCIUM mg/dL 9.4 9.6 9.7 9.2   BILIRUBIN mg/dL  --  0.6 0.6 0.6   ALK PHOS U/L  --  118* 114 114   ALT (SGPT) U/L  --  9 10 11   AST (SGOT) U/L  --  29 35 41*   GLUCOSE mg/dL 98 93 119* 153*     Results from last 7 days   Lab Units 01/17/25  0638 01/17/25  0349 01/17/25  0231 01/15/25  0433 01/14/25  0318 01/13/25  0311   MAGNESIUM mg/dL  --   --  2.0 2.2 1.8 2.1   PHOSPHORUS mg/dL  --  4.6* 5.2* 4.0 3.3 6.3*   HEMOGLOBIN g/dL  9.4*  --  9.4* 9.7* 9.9* 10.9*   HEMATOCRIT % 32.9*  --  32.5* 33.2* 34.6* 37.9   TRIGLYCERIDES mg/dL  --   --   --   --   --  136     COVID19   Date Value Ref Range Status   01/13/2025 Not Detected Not Detected - Ref. Range Final     Lab Results   Component Value Date    HGBA1C 5.60 01/13/2025         Pertinent Medications Reviewed.     Malnutrition Severity Assessment      Patient meets criteria for : (S) Moderate (non-severe) Malnutrition         Nutrition Diagnosis         Nutrition Dx Problem 1 Moderate malnutrition related to Inability to consume sufficient energy as evidenced by unintended weight change., NPO., and repeated admissions decreasing intake, HD/PD increasing needs     Nutrition Intervention           Current Nutrition Orders & Evaluation of Intake       Current PO Diet NPO Diet NPO Type: Tube Feeding   Supplement Orders Placed This Encounter      Feeding Tube Insertion - [employer] Effect System      Tube Feeding: Formula: Novasource Renal; Feeding Type: Continuous; Start at: 25 mL/hr; Then Advance By: Do Not Advance; Water Flush: Other; Other: 65; Every: 2 hours; Water Bolus: None      Dietary Nutrition Supplements Other (See Comment); ProSource TF           Nutrition Intervention/Prescription        NovaSource Renal @ 25 mL/hr x 22 hrs  Flush 65 mL q2h (780 ml)  Provides: 1100 kcal, 50 g pro, 1176 mL fluid    ProSource TF 1 packet BID  Flush 15 ml H2O before and after each administration  Provides: 80 kcal, 22 g pro, 60 ml fluid    Propofol @ 19.32 ml/hr provides additional 510 kcal from lipids    Total provided: 1690 kcal, 72 g pro, 1236 ml        Medical Nutrition Therapy/Nutrition Education          Learner     Readiness Patient  Education not indicated at this time     Method     Response N/A  N/A     Monitor/Evaluation        Monitor Per protocol, Pertinent labs, POC/GOC, Diet advancement     Nutrition Discharge Plan         To be determined     Electronically signed by:  Emma Collazo  RD  01/17/25 12:32 EST

## 2025-01-17 NOTE — SIGNIFICANT NOTE
01/17/25 0700   Physical Therapy Time and Intention   Session Not Performed unable to evaluate, medical status change  (Transferred to CCU. Intubated, sedated early AM)

## 2025-01-17 NOTE — SIGNIFICANT NOTE
01/17/25 0850   OTHER   Discipline occupational therapist   Rehab Time/Intention   Session Not Performed other (see comments)  (Hold, transferred to CCU, intubated and sedated.)

## 2025-01-17 NOTE — PLAN OF CARE
Goal Outcome Evaluation:               Pt intubated, pressors started, sedation titrated per order.

## 2025-01-17 NOTE — SIGNIFICANT NOTE
01/17/25 0200   Vitals   Heart Rate 57   BP (!) 73/37   Noninvasive MAP (mmHg) 49   Oxygen Therapy   SpO2 100 %     RRT from floor, MD at bedside patient currently on Bipap.  Order for 500 LR bolus.  Plan to intubate once BP stabilizes, order for Levo.  IV access obtained

## 2025-01-17 NOTE — PROGRESS NOTES
[facility]   Hospitalist Progress Note    Date of admission: 1/11/2025  Patient Name: Nelson Wang  1945  Date: 1/16/2025      Subjective     No chief complaint on file.      Interval Followup: remains confused.  Not able to take po.  D/w family - coretrak/tube feeds ordered for today       Objective     Vitals:   Temp:  [97.8 °F (36.6 °C)-98.2 °F (36.8 °C)] 98.1 °F (36.7 °C)  Heart Rate:  [56-79] 71  Resp:  [12-18] 16  BP: (125-142)/(39-61) 125/59  Flow (L/min) (Oxygen Therapy):  [2] 2    Physical Exam  Confused in bed, frail/thin   B/l ronchi, dry hacking cough intermittently   Rrr No lower extremity pitting edema  Abd nondistended      Result Review:  Vital signs, labs and recent relevant imaging reviewed.      CBC          1/13/2025    03:11 1/14/2025    03:18 1/15/2025    04:33   CBC   WBC 6.81  6.58  5.22    RBC 4.05  3.71  3.65    Hemoglobin 10.9  9.9  9.7    Hematocrit 37.9  34.6  33.2    MCV 93.6  93.3  91.0    MCH 26.9  26.7  26.6    MCHC 28.8  28.6  29.2    RDW 19.6  18.6  18.6    Platelets 226  187  177      CMP          1/13/2025    03:11 1/14/2025    03:18 1/15/2025    04:33   CMP   Glucose 77  153  119    BUN 29  14  21    Creatinine 4.68  3.01  4.57    EGFR 12.1  20.5  12.4    Sodium 136  134  132    Potassium 5.3  4.2  4.1    Chloride 100  99  96    Calcium 9.7  9.2  9.7    Total Protein  7.1  7.0    Albumin  3.0  3.0    Globulin  4.1  4.0    Total Bilirubin  0.6  0.6    Alkaline Phosphatase  114  114    AST (SGOT)  41  35    ALT (SGPT)  11  10    Albumin/Globulin Ratio  0.7  0.8    BUN/Creatinine Ratio 6.2  4.7  4.6    Anion Gap 11.4  10.3  12.2          acetaminophen    albumin human    senna-docusate sodium **AND** polyethylene glycol **AND** bisacodyl **AND** bisacodyl    calcium carbonate    dextrose    dextrose    Diclofenac Sodium    glucagon (human recombinant)    ipratropium-albuterol    Lidocaine    midodrine    ondansetron ODT    polyethyl glycol-propyl glycol    QUEtiapine     sodium chloride    sodium chloride    sodium chloride    sodium chloride    [Held by provider] acetaminophen, 1,000 mg, Oral, Q8H  arformoterol, 15 mcg, Nebulization, BID - RT  artificial tears, , Both Eyes, 4x Daily  [START ON 1/17/2025] atorvastatin, 40 mg, Nasogastric, Daily  budesonide, 0.5 mg, Nebulization, BID - RT  famotidine, 10 mg, Intravenous, Daily  [Held by provider] famotidine, 10 mg, Oral, Daily  heparin (porcine), 5,000 Units, Subcutaneous, Q12H  ipratropium-albuterol, 3 mL, Nebulization, BID - RT  [START ON 1/17/2025] levothyroxine, 175 mcg, Nasogastric, Q AM  ofloxacin, 2 drop, Left Eye, 4x Daily  QUEtiapine, 37.5 mg, Nasogastric, Nightly  [Held by provider] sevelamer, 800 mg, Oral, TID With Meals  sodium chloride, 10 mL, Intravenous, Q12H  sodium chloride, 10 mL, Intravenous, Q12H        EEG    Result Date: 1/13/2025  Underlying background suggest diffuse cerebral dysfunction of moderate degree, most commonly seen due to toxic/metabolic cause The triphasic sharp waves are abnormal but nonspecific.  These are most commonly seen due to significant toxic/metabolic disarray, most notably liver or kidney dysfunction.  They may also be seen as a side effect of certain medications such as cefepime.  Less likely, they can also be found on the ictal/interictal spectrum This report is transcribed using the Dragon dictation system.      XR Chest 1 View    Result Date: 1/12/2025  Increased bilateral infiltrates are seen. The findings may represent infectious multifocal pneumonia. Aspiration pneumonia cannot be excluded. Pulmonary edema is possible. A combination of these differential considerations may exist. Please see above comments for further detail.    Portions of this note were completed with a voice recognition program.  Electronically Signed By-Stan Hogan MD On:1/12/2025 11:24 PM      CT Head Without Contrast Stroke Protocol    Result Date: 1/12/2025  Impression: Stable examination without acute  intracranial process. Electronically Signed: Margaret Hammond MD  1/12/2025 10:57 AM EST  Workstation ID: [id number]    CT Pelvis Without Contrast    Result Date: 1/11/2025  There is a suspected acute-to-subacute nondisplaced periprosthetic fracture involving the proximal right femur, as discussed. No other acute fractures are seen. No dislocation.   Portions of this note were completed with a voice recognition program.  Electronically Signed By-Stan Hogan MD On:1/11/2025 9:15 PM       Assessment / Plan     Summary: 79 y.o. ESRD on dialysis, type 2 diabetes, dementia, discitis having completed vancomycin, A-fib, restrictive lung disease.  Patient recently discharged following long hospitalization, admitted from 12/5/2024 through 1/8/2025.  Patient discharged home with in-home dialysis.  Unfortunately today patient's dialysis machine not working.  Patient's primary nephrologist was contacted, unable to get patient into other outpatient dialysis facilities.  Therefore, decision was made to direct admit patient into the hospital for dialysis as he has not had dialysis for 3 days.  Hospitalist service contacted for admission orders, nephrology consulted for dialysis needs.      RRT called morning of 12th.  Patient was found to have fixed gaze to the right.  Patient only responding to painful stimuli minimally.  Stat CT obtained showing stable examination without acute intracranial process.  Teleneurology consulted during stroke RRT.  Felt altered mental status and confusion most likely secondary to toxic or metabolic etiology.  During stroke RRT patient noted to be hypotensive, while fluid bolus running, daughter endorses improvement in physical exam.  Patient was transferred to a telemetry bed, blood pressures again started drifting down therefore felt safest to transfer patient to the ICU for pressors if needed.     Patient's CT scan of pelvis does reveal right periprosthetic proximal femur fracture.  Orthopedic  surgery consulted and able to talk with patient and daughter at bedside.  At this time plan is for conservative measures, continue nonoperative treatment with toe-touch as able, continue to work with PT and OT and follow-up in clinic clos    Assessment/Plan (clinically significant if listed here)  Encephalopathy  End-stage renal disease on dialysis  Hypotension  Type 2 diabetes  Acute to subacute nondisplaced periprosthetic fracture involving proximal right femur  Dementia  Discitis, completed course of vancomycin  Atrial fibrillation not on anticoagulation due to falls  Restrictive lung disease    EEG - IMPRESSION:   Underlying background suggest diffuse cerebral dysfunction of moderate degree, most commonly seen due to toxic/metabolic cause   The triphasic sharp waves are abnormal but nonspecific.  These are most commonly seen due to significant toxic/metabolic disarray, most notably liver or kidney dysfunction.  They may also be seen as a side effect of certain medications such as cefepime.  Less likely, they can also be found on the ictal/interictal spectrum    Ct head - showed white matter changes and atrophy without acute infarct     Mentation remains limited, continue delirium precautions and supportive care  coretrak/tube feeds ordered for today as ~4 days without po intake at this point and not safe for po   Will check repeat labs in am x1, nephro checking for HD monitoring as well, cbc bmp mg phos and check procal  Nonoperative management of fracture per Ortho, monitor periodically with imaging outpatient, appreciate assistance - wbat, toe-touch as tolerated   IV  ofirmev prn for now until able to take po, avoiding opiates/sedating meds as able, try lidocaine patch, nonnarcotic options as able   Monitoring off abx, had briefly been on cefepime may have contributed in part to initial confusion concerns but should be resolving if this was the sole source  Continue supplemental oxygen as needed mainly at  night or with sleep for sedation  Cont HD as per nephrology, appreciate assistance, monitor bp closely, midodrine prior to HD   Echo reaasonable, troponin change suspect in setting of esrd/hypotension  Cont po meds as able/giving via NG currently   Cont seroquel at night as able  Cont synthroid  Cont statin  Dnr/dni, cont goc discussions   PT/OT - cont as able   Spot checking labs       Dispo: home with family/caregivers once medically stable.   D/w SW    VTE Prophylaxis:  Pharmacologic & mechanical VTE prophylaxis orders are present.      Medical Intervention Limits: No intubation (DNI)  Code Status (Patient has no pulse and is not breathing): No CPR (Do Not Attempt to Resuscitate)  Medical Interventions (Patient has pulse or is breathing): Limited Support

## 2025-01-17 NOTE — NURSING NOTE
Pt transferred to ICU 10; report given at bedside.       Safety sitter at bedside notified this RN that pt had 30-40 seconds of apnea with 4 breaths/min. This RN sternal rubbed the pt; pt did not wake, but respirations increased to 10-12. Notified on-call PA; abg's ordered. Ph resulted at 7.2 and c02 resulted at 75.8. orders placed for transfer to tele floor and continuous bipap.   This RN called daughter to notify her of pt status. Daughter arrived to floor.   Bipap requested from RT.   MD and PA arrived to floor. Transfer orders to critical care placed.   Bed placement received, this RN, other staff RN, RT, and safety sitter transferred pt to ICU 10.

## 2025-01-17 NOTE — PROGRESS NOTES
Pulmonary / Critical Care Progress Note      Patient Name: Nelson Wang  : 1945  MRN: [id number]  Attending:  Devante Rosado MD   Date of admission: 2025    Subjective   Subjective   Critically ill with respiratory failure    Had issue last night patient was agonal he breathing only a few times a minute.  Patient was placed on BiPAP moved the ICU.  Patient was hypotensive at some point during this process.  Ultimately intubated and started on pressors.  Patient currently intubated, sedated.  Does breathe on his own and pressure support ventilation  Does grimace some to pain  No fevers.  Started on empiric antibiotics  Did have bump in troponin up to 1900  Receiving dialysis today        Objective   Objective     Vitals:   Vital signs for last 24 hours:  Temp:  [97.9 °F (36.6 °C)-98.6 °F (37 °C)] 98.4 °F (36.9 °C)  Heart Rate:  [54-72] 59  Resp:  [4-40] 20  BP: ()/(37-90) 155/54  FiO2 (%):  [50 %] 50 %    Intake/Output last 3 shifts:  I/O last 3 completed shifts:  In: -   Out: 1000   Intake/Output this shift:  I/O this shift:  In: 1291.3 [I.V.:1231.3; NG/GT:60]  Out: 0     Vent settings for last 24 hours:  Mode: VC/AC  FiO2 (%):  [50 %] 50 %  S RR:  [20-24] 20  S VT:  [400 mL] 400 mL  PEEP/CPAP (cm H2O):  [5 cm H20] 5 cm H20  MAP (cm H2O):  [9.9-11] 11    Hemodynamic parameters for last 24 hours:       Physical Exam:  Vital Signs Reviewed   General: Frail critically ill-appearing male intubated and sedated on ventilator  HEENT:  PERRL, EOMI.  OP with endotracheal tube  Chest: diminished with crackles bilaterally, tympanic to percussion bilaterally, synchronous with ventilator  CV: RRR, no MGR, pulses 2+, equal.  Abd:  Soft, NT, ND, + BS, no HSM  EXT:  no clubbing, no cyanosis, no edema  Neuro: Intubated and sedated, grimacing to pain, CN grossly intact, no focal deficits.    Skin: No rashes or lesions noted        Result Review    Result Review:  I have personally reviewed the results from the  time of this admission to 1/17/2025 06:20 EST and agree with these findings:  [x]  Laboratory  [x]  Microbiology  [x]  Radiology  [x]  EKG/Telemetry   [x]  Cardiology/Vascular   []  Pathology  []  Old records  []  Other:  Most notable findings include:       Lab 01/17/25  0349 01/17/25  0231 01/15/25  0433 01/14/25  0318 01/13/25  0311 01/12/25  1030 01/12/25  0734 01/11/25  1418   WBC  --  5.25 5.22 6.58 6.81 7.11 7.79 5.43   HEMOGLOBIN  --  9.4* 9.7* 9.9* 10.9* 10.9* 10.4* 10.7*   HEMATOCRIT  --  32.5* 33.2* 34.6* 37.9 37.4* 35.5* 35.2*   PLATELETS  --  159 177 187 226 237 209 224   SODIUM 131* 133* 132* 134* 136  --  130*  130* 137   POTASSIUM 4.0 4.0 4.1 4.2 5.3*  --  4.6  4.6 4.4   CHLORIDE 96* 97* 96* 99 100  --  94*  94* 98   CO2 22.1 26.0 23.8 24.7 24.6  --  26.9  27.9 24.2   BUN 17 15 21 14 29*  --  21  21 50*   CREATININE 3.91* 4.00* 4.57* 3.01* 4.68*  --  3.81*  3.66* 6.07*   GLUCOSE 98 93 119* 153* 77  --  112*  113* 92   CALCIUM 9.4 9.6 9.7 9.2 9.7  --  10.2  10.0 10.3   PHOSPHORUS 4.6* 5.2* 4.0 3.3 6.3*  --  4.4  --    TOTAL PROTEIN  --  6.5 7.0 7.1  --   --  6.8  --    ALBUMIN 2.6* 2.5* 3.0* 3.0*  --   --  3.0*  --    GLOBULIN  --  4.0 4.0 4.1  --   --  3.8  --      CT Head Without Contrast Stroke Protocol    Result Date: 1/12/2025  CT HEAD WO CONTRAST STROKE PROTOCOL Date of Exam: 1/12/2025 10:31 AM EST Indication: stroke r/o. Comparison: CT head 12/30/2024 Technique: Axial CT images were obtained of the head without contrast administration.  Reconstructed coronal and sagittal images were also obtained. Automated exposure control and iterative construction methods were used. Scan Time: 10:43 a.m. Results discussed with Nathaniel at 10:56 a.m.. Findings: There is no evidence of acute infarction. There there is no acute intracranial hemorrhage. There are no extra-axial collections. Ventricles and CSF spaces are symmetric. No mass effect nor hydrocephalus. There are periventricular white matter  changes and cerebral atrophy which appears age-related.  There is fluid opacification of the right mastoid air cells. Paranasal sinuses appear aerated.  Osseous structures and orbits appear intact. There is some soft tissue prominence along the right posterior calvarium which may represent a small hematoma.     Impression: Impression: Stable examination without acute intracranial process. Electronically Signed: Margaret Hammond MD  1/12/2025 10:57 AM EST  Workstation ID: [id number]    CT Head Without Contrast    Result Date: 12/30/2024  CT HEAD WO CONTRAST Date of Exam: 12/30/2024 10:23 PM EST Indication: altered mental status. Comparison: CT head without contrast 12/28/2024 Technique: Axial CT images were obtained of the head without contrast administration.  Reconstructed coronal and sagittal images were also obtained. Automated exposure control and iterative construction methods were used. Findings: Image quality slightly degraded due to motion. There is no intracranial hemorrhage. No mass effect or midline shift. Generalized cerebral volume loss unchanged. Moderate white matter findings suggesting chronic microvascular disease. No intraventricular hemorrhage. Posterior fossa without acute abnormality. No abnormal extra-axial fluid collection. The globes are symmetric. There is no retro-orbital abnormality. The mastoid air cells are well-aerated on the left. Small amount of fluid in the right mastoid air cells. No sinus fluid level. Negative for calvarial fracture.     Impression: Impression: 1. No intracranial hemorrhage or acute intracranial abnormality.. 2. Generalized cerebral atrophy with moderate white matter findings of chronic microvascular disease. Electronically Signed: Lasha Ramos MD  12/30/2024 10:38 PM EST  Workstation ID: [id number]    CT Head Without Contrast    Result Date: 12/28/2024  CT CERVICAL SPINE WO CONTRAST-, CT HEAD WO CONTRAST-  (COMBINED REPORT)  Date of exam: 12/28/2024, 10:12 P.M.   Indication: fall; j96.01-acute respiratory failure with hypoxia; j81.0-acute pulmonary edema; r26.2-difficulty in walking, not elsewhere classified; r13.12-dysphagia, oropharyngeal phase  Comparisons: 12/13/2024; 11/2/2024.  TECHNIQUE: Axial CT images were obtained of the head & cervical spine without contrast administration. Reconstructed 2D (two-dimensional) coronal and sagittal images were also obtained. Automated exposure control and iterative construction methods were used. Additionally, the radiation dose reduction device was turned on for each scan per the ALARA (As Low as Reasonably Achievable) protocol. Please see the electronic medical record, EMR (i.e., Epic), for the documented radiation dose.  EXAM FINDINGS:   HEAD CT: A routine nonenhanced head CT was performed. No acute brain abnormality is seen. No acute infarct. No acute intracranial hemorrhage. No midline shift or acute intracranial mass effect is seen. The ventricular system and the extra-axial spaces are prominent, suggesting central atrophy. There is suspected moderate-to-severe chronic small vessel ischemic disease. Arterial calcifications are seen. No acute skull fracture is identified. The patient has undergone bilateral cataract extractions with intra-ocular lens implants. Age-indeterminate congestive and/or inflammatory changes involve the right mastoid air cell complex, seen previously. Grossly, no significant acute findings are seen within the imaged paranasal sinuses. There is a suspected mild acute scalp contusion along the high right parietal region. The study is motion limited.  CONCLUSION: No acute brain abnormality is seen. No acute intracranial hemorrhage. No acute skull fracture.   CERVICAL SPINE CT: A routine nonenhanced cervical spine CT was performed. Again, sagittal and coronal two-dimensional (2D) reformations are provided for review. The study is motion-limited. Grossly, no acute cervical spine fracture or acute malalignment  is identified. Moderate-to-severe degenerative changes are seen at multiple levels with chronic malalignment. 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 Small nonspecific bilateral cervical lymph nodes are seen. There are arterial calcifications. If symptoms or clinical concerns persist, consider imaging follow-up.  CONCLUSION: Grossly, no acute cervical spine fracture is seen. Grossly, no acute subluxation abnormality. The study is motion-limited.    Portions of this note were completed with a voice recognition program.  Electronically Signed By-Stan Hogan MD On:12/28/2024 11:23 PM       Results for orders placed during the hospital encounter of 01/11/25    Adult Transthoracic Echo Complete w/ Color, Spectral and Contrast if necessary per protocol    Interpretation Summary    Left ventricular ejection fraction appears to be 61 - 65%.    Left ventricular diastolic function is consistent with (grade I) impaired relaxation.    The left atrial cavity is mildly dilated.    Estimated right ventricular systolic pressure from tricuspid regurgitation is moderately elevated (45-55 mmHg).      Procalcitonin and cultures negative  EEG with features consistent with encephalopathy    ABG overnight with acute on chronic hypercapnia  Cortisol 1.30    Assessment & Plan   Assessment / Plan     Active Hospital Problems:  Active Hospital Problems    Diagnosis     **ESRD (end stage renal disease) on dialysis     Moderate protein-calorie malnutrition      Impression:  Altered mental status  Acute hypoxemic and hypercapnic respiratory failure require mechanical ventilation  Toxic/metabolic encephalopathy  CO2 narcosis  Toxic/metabolic encephalopathy possible hypoactive delirium  Hypotension  NSTEMI, likely from demand ischemia/type II  End-stage renal disease on hemodialysis  Chronic compensated diastolic congestive heart failure  Possible sepsis  Stroke rule out  Type 2  diabetes, now with hypoglycemia  Discitis status post course of vancomycin  Atrial fibrillation  Small chronic right pleural effusion  Femur fracture relative adrenal insufficiency       Plan:  Decompensated overnight.  ABG with acute on chronic hypercapnia.  Also hypotensive during this.  Intubated and placed on norepinephrine.  Troponin did bump.  Did have relatively clean left heart cath in May 2024.  Also had echocardiogram a few days ago that was grossly unremarkable.  We also did check a random cortisol level that was low.  Patient could have relative adrenal insufficiency contributing to his hypotension.  Continue chemical ventilation on current settings.  Will do SBT in the a.m.  Wean O2 to keep SPO2 greater than 90%  Wean sedation  Continue norepinephrine to keep mean arterial pressure greater than 65  Initiate stress dose steroids with IV hydrocortisone  Heparinize patient per ACS protocol given marked bump in troponins.  No indication for left heart cath at this time given relatively clean coronaries a few months ago.  Overall, be relatively poor candidate for any sort of intervention  Will check limited echo to see if there is a new wall motion abnormality which could be indicative of myocardial ischemia  Agree with empiric antibiotics.  Check procalcitonin and culture patient  Dialysis today per nephrology.  Appreciate assistance  EEG earlier this hospitalization consistent with encephalopathy  Has Cortrak.  Tube feeds at goal per dietitian recommendations    Had extensive discussions with patient's nephrologist and primary this morning.  Also discussed the case with the patient's daughter, who is one of our colleagues.  Unfortunately, his prognosis is very poor.  He has had functional decline over the last couple of months with multiple hospitalizations.  Is also a cardiac arrest and prolonged time on mechanical ventilation.  Mental status is poor.  Also with multiple other comorbidities, his prognosis  is grim.  I did discuss with her potential options.  At this point we will check a limited echo to see if he has any myocardial ischemia or not.  Will also do a spontaneous breathing trial in the morning.  If he fails a spontaneous breathing trial, she is leaning towards proceeding with withdrawal of care and comfort measures.  If he passes a spontaneous breathing trial, we can extubate the patient, make him a DNR/DNI, try to improve him from a medical standpoint to the point where he could potentially get home with hospice in the upcoming days.    VTE Prophylaxis:  Pharmacologic & mechanical VTE prophylaxis orders are present.    CODE STATUS:   Code Status (Patient has no pulse and is not breathing): No CPR (Do Not Attempt to Resuscitate)  Medical Interventions (Patient has pulse or is breathing): Full Support      The patient is critically ill in the ICU with CO2 narcosis, respiratory failure requiring mechanical ventilation, NSTEMI, end-stage renal disease, altered mental status. Bedside rounds were performed with nursing staff, respiratory therapy, pharmacy, nutritional services, social work. I have personally reviewed the chart, labs and any pertinent imaging available.  I have spent 45 minutes of critical care time, excluding procedures, in the care of this patient.    Electronically signed by Scottie Valentin MD, 01/17/25, 1:41 PM EST.

## 2025-01-18 LAB
ALBUMIN SERPL-MCNC: 2.6 G/DL (ref 3.5–5.2)
ANION GAP SERPL CALCULATED.3IONS-SCNC: 10.3 MMOL/L (ref 5–15)
APTT PPP: 129.4 SECONDS (ref 78–95.9)
APTT PPP: 162.6 SECONDS (ref 78–95.9)
APTT PPP: 40.3 SECONDS (ref 78–95.9)
BASOPHILS # BLD AUTO: 0.03 10*3/MM3 (ref 0–0.2)
BASOPHILS NFR BLD AUTO: 0.6 % (ref 0–1.5)
BUN SERPL-MCNC: 18 MG/DL (ref 8–23)
BUN/CREAT SERPL: 6.2 (ref 7–25)
CALCIUM SPEC-SCNC: 8.9 MG/DL (ref 8.6–10.5)
CHLORIDE SERPL-SCNC: 93 MMOL/L (ref 98–107)
CO2 SERPL-SCNC: 25.7 MMOL/L (ref 22–29)
CREAT SERPL-MCNC: 2.89 MG/DL (ref 0.76–1.27)
DEPRECATED RDW RBC AUTO: 58.9 FL (ref 37–54)
EGFRCR SERPLBLD CKD-EPI 2021: 21.6 ML/MIN/1.73
EOSINOPHIL # BLD AUTO: 0 10*3/MM3 (ref 0–0.4)
EOSINOPHIL NFR BLD AUTO: 0 % (ref 0.3–6.2)
ERYTHROCYTE [DISTWIDTH] IN BLOOD BY AUTOMATED COUNT: 18.8 % (ref 12.3–15.4)
GLUCOSE BLDC GLUCOMTR-MCNC: 161 MG/DL (ref 70–99)
GLUCOSE BLDC GLUCOMTR-MCNC: 185 MG/DL (ref 70–99)
GLUCOSE BLDC GLUCOMTR-MCNC: 197 MG/DL (ref 70–99)
GLUCOSE SERPL-MCNC: 210 MG/DL (ref 65–99)
HCT VFR BLD AUTO: 32.1 % (ref 37.5–51)
HGB BLD-MCNC: 9.9 G/DL (ref 13–17.7)
HOLD SPECIMEN: NORMAL
IMM GRANULOCYTES # BLD AUTO: 0.02 10*3/MM3 (ref 0–0.05)
IMM GRANULOCYTES NFR BLD AUTO: 0.4 % (ref 0–0.5)
LYMPHOCYTES # BLD AUTO: 0.84 10*3/MM3 (ref 0.7–3.1)
LYMPHOCYTES NFR BLD AUTO: 16.9 % (ref 19.6–45.3)
MAGNESIUM SERPL-MCNC: 1.9 MG/DL (ref 1.6–2.4)
MCH RBC QN AUTO: 26.9 PG (ref 26.6–33)
MCHC RBC AUTO-ENTMCNC: 30.8 G/DL (ref 31.5–35.7)
MCV RBC AUTO: 87.2 FL (ref 79–97)
MONOCYTES # BLD AUTO: 0.28 10*3/MM3 (ref 0.1–0.9)
MONOCYTES NFR BLD AUTO: 5.6 % (ref 5–12)
NEUTROPHILS NFR BLD AUTO: 3.8 10*3/MM3 (ref 1.7–7)
NEUTROPHILS NFR BLD AUTO: 76.5 % (ref 42.7–76)
NRBC BLD AUTO-RTO: 0 /100 WBC (ref 0–0.2)
PHOSPHATE SERPL-MCNC: 2.2 MG/DL (ref 2.5–4.5)
PLATELET # BLD AUTO: 133 10*3/MM3 (ref 140–450)
PMV BLD AUTO: 9.7 FL (ref 6–12)
POTASSIUM SERPL-SCNC: 4 MMOL/L (ref 3.5–5.2)
RBC # BLD AUTO: 3.68 10*6/MM3 (ref 4.14–5.8)
SODIUM SERPL-SCNC: 129 MMOL/L (ref 136–145)
VANCOMYCIN SERPL-MCNC: 22.52 MCG/ML (ref 5–40)
WBC NRBC COR # BLD AUTO: 4.97 10*3/MM3 (ref 3.4–10.8)

## 2025-01-18 PROCEDURE — 85730 THROMBOPLASTIN TIME PARTIAL: CPT | Performed by: FAMILY MEDICINE

## 2025-01-18 PROCEDURE — 80069 RENAL FUNCTION PANEL: CPT | Performed by: PHYSICIAN ASSISTANT

## 2025-01-18 PROCEDURE — 94799 UNLISTED PULMONARY SVC/PX: CPT

## 2025-01-18 PROCEDURE — 99233 SBSQ HOSP IP/OBS HIGH 50: CPT | Performed by: INTERNAL MEDICINE

## 2025-01-18 PROCEDURE — 94761 N-INVAS EAR/PLS OXIMETRY MLT: CPT

## 2025-01-18 PROCEDURE — 99291 CRITICAL CARE FIRST HOUR: CPT | Performed by: INTERNAL MEDICINE

## 2025-01-18 PROCEDURE — 25010000002 HEPARIN (PORCINE) 25000-0.45 UT/250ML-% SOLUTION: Performed by: INTERNAL MEDICINE

## 2025-01-18 PROCEDURE — 25010000002 PIPERACILLIN SOD-TAZOBACTAM PER 1 G: Performed by: PHYSICIAN ASSISTANT

## 2025-01-18 PROCEDURE — 25010000002 HYDROCORTISONE SOD SUC (PF) 100 MG RECONSTITUTED SOLUTION: Performed by: NURSE PRACTITIONER

## 2025-01-18 PROCEDURE — 82948 REAGENT STRIP/BLOOD GLUCOSE: CPT

## 2025-01-18 PROCEDURE — 80202 ASSAY OF VANCOMYCIN: CPT | Performed by: INTERNAL MEDICINE

## 2025-01-18 PROCEDURE — 94003 VENT MGMT INPAT SUBQ DAY: CPT

## 2025-01-18 PROCEDURE — 94660 CPAP INITIATION&MGMT: CPT

## 2025-01-18 PROCEDURE — 83735 ASSAY OF MAGNESIUM: CPT | Performed by: INTERNAL MEDICINE

## 2025-01-18 PROCEDURE — 82948 REAGENT STRIP/BLOOD GLUCOSE: CPT | Performed by: PHYSICIAN ASSISTANT

## 2025-01-18 PROCEDURE — 85025 COMPLETE CBC W/AUTO DIFF WBC: CPT | Performed by: INTERNAL MEDICINE

## 2025-01-18 PROCEDURE — 94668 MNPJ CHEST WALL SBSQ: CPT

## 2025-01-18 PROCEDURE — 85730 THROMBOPLASTIN TIME PARTIAL: CPT | Performed by: NURSE PRACTITIONER

## 2025-01-18 RX ORDER — ACETAMINOPHEN 160 MG/5ML
650 SOLUTION ORAL EVERY 6 HOURS
Status: DISCONTINUED | OUTPATIENT
Start: 2025-01-18 | End: 2025-01-24

## 2025-01-18 RX ORDER — QUETIAPINE FUMARATE 25 MG/1
12.5 TABLET, FILM COATED ORAL NIGHTLY
Status: DISCONTINUED | OUTPATIENT
Start: 2025-01-18 | End: 2025-01-21

## 2025-01-18 RX ADMIN — Medication 10 ML: at 20:04

## 2025-01-18 RX ADMIN — PIPERACILLIN AND TAZOBACTAM 3.38 G: 3; .375 INJECTION, POWDER, FOR SOLUTION INTRAVENOUS at 11:16

## 2025-01-18 RX ADMIN — Medication 10 ML: at 08:31

## 2025-01-18 RX ADMIN — WHITE PETROLATUM 57.7 %-MINERAL OIL 31.9 % EYE OINTMENT: at 11:16

## 2025-01-18 RX ADMIN — HYDROCORTISONE SODIUM SUCCINATE 50 MG: 100 INJECTION, POWDER, FOR SOLUTION INTRAMUSCULAR; INTRAVENOUS at 15:52

## 2025-01-18 RX ADMIN — BUDESONIDE 0.5 MG: 0.5 INHALANT ORAL at 06:14

## 2025-01-18 RX ADMIN — ARFORMOTEROL TARTRATE 15 MCG: 15 SOLUTION RESPIRATORY (INHALATION) at 19:49

## 2025-01-18 RX ADMIN — OFLOXACIN 2 DROP: 3 SOLUTION OPHTHALMIC at 11:17

## 2025-01-18 RX ADMIN — HYDROCORTISONE SODIUM SUCCINATE 50 MG: 100 INJECTION, POWDER, FOR SOLUTION INTRAMUSCULAR; INTRAVENOUS at 08:30

## 2025-01-18 RX ADMIN — PIPERACILLIN AND TAZOBACTAM 3.38 G: 3; .375 INJECTION, POWDER, FOR SOLUTION INTRAVENOUS at 23:02

## 2025-01-18 RX ADMIN — HYDROCORTISONE SODIUM SUCCINATE 50 MG: 100 INJECTION, POWDER, FOR SOLUTION INTRAMUSCULAR; INTRAVENOUS at 20:02

## 2025-01-18 RX ADMIN — WHITE PETROLATUM 57.7 %-MINERAL OIL 31.9 % EYE OINTMENT: at 08:30

## 2025-01-18 RX ADMIN — FAMOTIDINE 10 MG: 10 INJECTION INTRAVENOUS at 08:30

## 2025-01-18 RX ADMIN — HEPARIN SODIUM 13 UNITS/KG/HR: 10000 INJECTION, SOLUTION INTRAVENOUS at 04:37

## 2025-01-18 RX ADMIN — QUETIAPINE FUMARATE 12.5 MG: 25 TABLET ORAL at 22:15

## 2025-01-18 RX ADMIN — BUDESONIDE 0.5 MG: 0.5 INHALANT ORAL at 19:49

## 2025-01-18 RX ADMIN — SENNOSIDES AND DOCUSATE SODIUM 2 TABLET: 50; 8.6 TABLET ORAL at 08:30

## 2025-01-18 RX ADMIN — ACETAMINOPHEN 650 MG: 160 SOLUTION ORAL at 15:52

## 2025-01-18 RX ADMIN — IPRATROPIUM BROMIDE AND ALBUTEROL SULFATE 3 ML: 2.5; .5 SOLUTION RESPIRATORY (INHALATION) at 19:49

## 2025-01-18 RX ADMIN — HYDROCORTISONE SODIUM SUCCINATE 50 MG: 100 INJECTION, POWDER, FOR SOLUTION INTRAMUSCULAR; INTRAVENOUS at 02:33

## 2025-01-18 RX ADMIN — ARFORMOTEROL TARTRATE 15 MCG: 15 SOLUTION RESPIRATORY (INHALATION) at 06:14

## 2025-01-18 RX ADMIN — LEVOTHYROXINE SODIUM 175 MCG: 75 TABLET ORAL at 05:01

## 2025-01-18 RX ADMIN — ATORVASTATIN CALCIUM 40 MG: 40 TABLET, FILM COATED ORAL at 08:30

## 2025-01-18 RX ADMIN — OFLOXACIN 2 DROP: 3 SOLUTION OPHTHALMIC at 08:31

## 2025-01-18 RX ADMIN — IPRATROPIUM BROMIDE AND ALBUTEROL SULFATE 3 ML: 2.5; .5 SOLUTION RESPIRATORY (INHALATION) at 06:14

## 2025-01-18 NOTE — CONSULTS
"Nutrition Services    Patient Name: Nelson Wang  YOB: 1945  MRN: [id number]  Admission date: 1/11/2025      CLINICAL NUTRITION ASSESSMENT      Reason for Assessment  Follow Up   H&P:  Past Medical History:   Diagnosis Date    Abnormal stress test     Anemia     IRON INFUSIONS WITH DIALYSIS    Anesthesia     WITH HIP REPLACEMENT DAUGHTER BELIEVES HAD SVT 2000    Ankle pain, right     Anxiety and depression     Arthritis     CKD (chronic kidney disease), stage IV     DIALYSIS [id number] SHILEY IN RIGHT CHEST    Diabetes     GERD (gastroesophageal reflux disease)     Gout     High cholesterol     History of peritoneal dialysis     HL (hearing loss)     Hyperkalemia     Hypertension     Hypothyroidism     Insomnia     Night terrors     Psoriasis     Psoriasis     Sleep apnea     Sleep walking     Thrombocytopenia     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    Vitiligo         Current Problems:   Active Hospital Problems    Diagnosis     **ESRD (end stage renal disease) on dialysis     Moderate protein-calorie malnutrition         Nutrition/Diet History         Narrative   Propofol d/c'd this morning. Pt tolerating pressure support.   EN order modified, rate adjusted to meet kcal/protein needs d/t Propofol discontinued . RD to follow per protocol.     Anthropometrics        Current Height, Weight Height: 162.5 cm (63.98\")  Weight: 72 kg (158 lb 11.7 oz)   Current BMI Body mass index is 27.27 kg/m².   BMI Classification Normal range   % %   Adjusted Body Weight (ABW) N/A   Weight Hx  Wt Readings from Last 2 Encounters:   01/18/25 0615 72 kg (158 lb 11.7 oz)   01/08/25 0516 64.4 kg (141 lb 15.6 oz)   01/07/25 0500 66.6 kg (146 lb 13.2 oz)   01/06/25 0500 64.3 kg (141 lb 12.1 oz)   01/05/25 0500 65.1 kg (143 lb 8.3 oz)   01/04/25 0521 64 kg (141 lb 1.5 oz)   01/02/25 0417 64.4 kg (141 lb 15.6 oz)   01/01/25 0600 62 kg (136 lb 11 oz)   12/31/24 0600 62.7 kg (138 lb 3.7 oz)   12/30/24 0917 63.2 " kg (139 lb 5.3 oz)   12/29/24 0500 61.8 kg (136 lb 3.9 oz)   12/28/24 0635 63 kg (138 lb 14.2 oz)   12/27/24 0541 63.1 kg (139 lb 1.8 oz)   12/26/24 0738 63.1 kg (139 lb 3.2 oz)   12/25/24 0333 67 kg (147 lb 11.3 oz)   12/24/24 0530 67.9 kg (149 lb 11.1 oz)   12/23/24 0418 68.1 kg (150 lb 2.1 oz)   12/22/24 0525 68.6 kg (151 lb 3.8 oz)   12/21/24 0500 75.4 kg (166 lb 3.6 oz)   12/20/24 0350 67.4 kg (148 lb 9.4 oz)   12/18/24 2009 65.3 kg (143 lb 15.4 oz)   12/18/24 0300 67 kg (147 lb 11.3 oz)   12/17/24 0440 65.8 kg (145 lb 1 oz)   12/16/24 0547 64.3 kg (141 lb 12.1 oz)   12/13/24 0407 62.5 kg (137 lb 12.6 oz)   12/12/24 0500 69.3 kg (152 lb 12.5 oz)   12/09/24 0600 70.2 kg (154 lb 12.2 oz)   12/06/24 1228 70.7 kg (155 lb 13.8 oz)   12/05/24 1850 75.3 kg (166 lb 0.1 oz)          Wt Change Observation UTD, fluctuations noted related to fluids. Pt with ESRD on HD. Overall trend down, -18% x 10 mo   1/18 wt stable     Estimated/Assessed Needs  Estimated Needs based on: Critical Care Guidelines       Energy Requirements 12-25 kcal/kg   EST Needs (kcal/day) 768-1600       Protein Requirements 1.2-2.0 g/kg   EST Daily Needs (g/day)        Fluid Requirements 25 ml/kg    Estimated Needs (mL/day) 1600     Labs/Medications Hyponatremia- will provide water flushes accordingly  Fentanyl, Levo, Heparin        Pertinent Labs Reviewed.   Results from last 7 days   Lab Units 01/18/25  0449 01/17/25  0349 01/17/25  0231 01/15/25  0433 01/14/25  0318   SODIUM mmol/L 129* 131* 133* 132* 134*   POTASSIUM mmol/L 4.0 4.0 4.0 4.1 4.2   CHLORIDE mmol/L 93* 96* 97* 96* 99   CO2 mmol/L 25.7 22.1 26.0 23.8 24.7   BUN mg/dL 18 17 15 21 14   CREATININE mg/dL 2.89* 3.91* 4.00* 4.57* 3.01*   CALCIUM mg/dL 8.9 9.4 9.6 9.7 9.2   BILIRUBIN mg/dL  --   --  0.6 0.6 0.6   ALK PHOS U/L  --   --  118* 114 114   ALT (SGPT) U/L  --   --  9 10 11   AST (SGOT) U/L  --   --  29 35 41*   GLUCOSE mg/dL 210* 98 93 119* 153*     Results from last 7 days    Lab Units 01/18/25  0449 01/17/25  0349 01/17/25  0231 01/15/25  0433 01/14/25  0318 01/13/25  0311   MAGNESIUM mg/dL 1.9  --  2.0 2.2   < > 2.1   PHOSPHORUS mg/dL 2.2*   < > 5.2* 4.0   < > 6.3*   HEMOGLOBIN g/dL 9.9*   < > 9.4* 9.7*   < > 10.9*   HEMATOCRIT % 32.1*   < > 32.5* 33.2*   < > 37.9   TRIGLYCERIDES mg/dL  --   --   --   --   --  136    < > = values in this interval not displayed.     COVID19   Date Value Ref Range Status   01/13/2025 Not Detected Not Detected - Ref. Range Final     Lab Results   Component Value Date    HGBA1C 5.60 01/13/2025         Pertinent Medications Reviewed.     Malnutrition Severity Assessment      Patient meets criteria for : (S) Moderate (non-severe) Malnutrition         Nutrition Diagnosis         Nutrition Dx Problem 1 Moderate malnutrition related to Inability to consume sufficient energy as evidenced by unintended weight change., NPO., and repeated admissions decreasing intake, HD/PD increasing needs     Nutrition Intervention           Current Nutrition Orders & Evaluation of Intake       Current PO Diet NPO Diet NPO Type: Tube Feeding   Supplement Orders Placed This Encounter      Feeding Tube Insertion - Cortrak System      Tube Feeding: Formula: Novasource Renal; Feeding Type: Continuous; Start at: 25 mL/hr; Then Advance By: Do Not Advance; Water Flush: Other; Other: 65; Every: 2 hours; Water Bolus: None      Dietary Nutrition Supplements Other (See Comment); ProSource TF           Nutrition Intervention/Prescription        As medically feasible, advance Nova Source Renal @ 45mLhr, flush 40mL q2h  Start at 25 mL/hr and advance 10 mL q 8 h to goal.     Provides: 1980 kcal, 90g pro, 1183mL fluid (703 FW + 480 flush)        Medical Nutrition Therapy/Nutrition Education          Learner     Readiness Patient  Education not indicated at this time     Method     Response N/A  N/A     Monitor/Evaluation        Monitor Per protocol, Pertinent labs, POC/GOC, Diet advancement      Nutrition Discharge Plan         To be determined     Electronically signed by:  Bárbara Gagnon RD  01/18/25 06:34 EST

## 2025-01-18 NOTE — PLAN OF CARE
Goal Outcome Evaluation:  Plan of Care Reviewed With: patient        Progress: no change  Outcome Evaluation: Patient is on ventilator AC/VC rate 20, Vt 400, 40%, Peep 5 tolerating well. Was switched to PS but went apenic and placed back on settings.

## 2025-01-18 NOTE — PROGRESS NOTES
[facility] Progress Note      Patient Name: Nelson Wang  : 1945  MRN: [id number]  Primary Care Physician:  Domingo Bravo MD  Date of admission: 2025    Subjective     Interval History:   F/u ESRD     Review of Systems:   Dialyzed yesterday, 500 cc removed  Extubated this AM - on bipap and lethargic   Off sedation & pressors     Objective     Vitals:   Temp:  [98.4 °F (36.9 °C)-99.4 °F (37.4 °C)] 99.4 °F (37.4 °C)  Heart Rate:  [64-82] 75  Resp:  [13-20] 13  BP: (105-168)/(54-73) 122/61  FiO2 (%):  [40 %-51 %] 40 %    Intake/Output Summary (Last 24 hours) at 2025 1254  Last data filed at 2025 0452  Gross per 24 hour   Intake 1910 ml   Output 500 ml   Net 1410 ml       Physical Exam:    General Appearance: lethargic ill appearing M on bipap   Neck: supple, no JVD  Lungs: Dec'd BS bilaterally   Heart: RRR, normal S1 and S2  Abdomen: soft, nontender, nondistended  Extremities: no edema     Scheduled Meds:     [Held by provider] acetaminophen, 1,000 mg, Oral, Q8H  arformoterol, 15 mcg, Nebulization, BID - RT  artificial tears, , Both Eyes, 4x Daily  atorvastatin, 40 mg, Nasogastric, Daily  budesonide, 0.5 mg, Nebulization, BID - RT  famotidine, 10 mg, Intravenous, Daily  [Held by provider] famotidine, 10 mg, Oral, Daily  hydrocortisone sodium succinate, 50 mg, Intravenous, Q6H  ipratropium-albuterol, 3 mL, Nebulization, BID - RT  levothyroxine, 175 mcg, Nasogastric, Q AM  ofloxacin, 2 drop, Left Eye, 4x Daily  piperacillin-tazobactam, 3.375 g, Intravenous, Q12H  senna-docusate sodium, 2 tablet, Nasogastric, BID  [Held by provider] sevelamer, 800 mg, Oral, TID With Meals  sodium chloride, 10 mL, Intravenous, Q12H  sodium chloride, 10 mL, Intravenous, Q12H      IV Meds:   heparin, 12 Units/kg/hr, Last Rate: 9 Units/kg/hr (25 0132)  Pharmacy to dose vancomycin,         Results Reviewed:   I have personally reviewed the results from the time of this admission  to 1/18/2025 12:54 EST     Results from last 7 days   Lab Units 01/18/25 0449 01/17/25 0349 01/17/25  0231 01/15/25  0433 01/14/25  0318   SODIUM mmol/L 129* 131* 133* 132* 134*   POTASSIUM mmol/L 4.0 4.0 4.0 4.1 4.2   CHLORIDE mmol/L 93* 96* 97* 96* 99   CO2 mmol/L 25.7 22.1 26.0 23.8 24.7   BUN mg/dL 18 17 15 21 14   CREATININE mg/dL 2.89* 3.91* 4.00* 4.57* 3.01*   CALCIUM mg/dL 8.9 9.4 9.6 9.7 9.2   BILIRUBIN mg/dL  --   --  0.6 0.6 0.6   ALK PHOS U/L  --   --  118* 114 114   ALT (SGPT) U/L  --   --  9 10 11   AST (SGOT) U/L  --   --  29 35 41*   GLUCOSE mg/dL 210* 98 93 119* 153*     Estimated Creatinine Clearance: 19.2 mL/min (A) (by C-G formula based on SCr of 2.89 mg/dL (H)).  Results from last 7 days   Lab Units 01/18/25  0449 01/17/25  0349 01/17/25  0231 01/15/25  0433   MAGNESIUM mg/dL 1.9  --  2.0 2.2   PHOSPHORUS mg/dL 2.2* 4.6* 5.2* 4.0         Results from last 7 days   Lab Units 01/18/25 0449 01/17/25  0638 01/17/25  0231 01/15/25  0433 01/14/25 0318   WBC 10*3/mm3 4.97 4.86 5.25 5.22 6.58   HEMOGLOBIN g/dL 9.9* 9.4* 9.4* 9.7* 9.9*   PLATELETS 10*3/mm3 133* 141 159 177 187     Results from last 7 days   Lab Units 01/17/25  0638 01/12/25  1030   INR  1.07 1.03       Assessment / Plan     ASSESSMENT:  - ESRD, was on home dialysis prior to admission, has left upper extremity AV fistula for access.  Electrolytes are stable apart from mild hypervolemic hyponatremia and hypophosphatemia (2.2, does not warrant replacement given circumstances).  Dialysis m/w/f.  Dialyzed yesterday with sofy ultrafiltration 0.5 kg removed.     - Type 2 diabetes with complications.  - Hypotension - improved, SBP 120s, off pressor    - Anemia of chronic kidney disease, close to goal  - Acute encephalopathy  -- Acute resp failure, extubated, on bipap    _R femur FX- ortho seeing    PLAN:  Prognosis is very poor.  Appropriate for comfort care, which is direction we're heading from my understanding in discussing with RN,  brian d/w ADALBERTO Oliva MD  01/18/25  12:54 [facility]    [facility]  [phone]

## 2025-01-18 NOTE — PROGRESS NOTES
RT EQUIPMENT DEVICE RELATED - SKIN ASSESSMENT    RT Medical Equipment/Device:     ETT Coley/Anchorfast    Skin Assessment:      Cheek:  Intact  Neck:  Intact  Nose:  Intact    Device Skin Pressure Protection:  Skin-to-device areas padded:  Anchorfast    Nurse Notification:  Kate Boles, RRT

## 2025-01-18 NOTE — PLAN OF CARE
Goal Outcome Evaluation:  Plan of Care Reviewed With: patient        Progress: no change  Outcome Evaluation: Patient is intubated on settings of , rate 20, Peep 5 and fio2 .40.  Will attemp SBT in am.

## 2025-01-18 NOTE — PROGRESS NOTES
RT EQUIPMENT DEVICE RELATED - SKIN ASSESSMENT    RT Medical Equipment/Device:     ETT Coley/Anchorfast    Skin Assessment:      Cheek:  Intact  Lips:  Intact  Mouth:  Intact    Device Skin Pressure Protection:  Pressure points protected    Nurse Notification:  Kate Singleton, RRT

## 2025-01-18 NOTE — PROGRESS NOTES
"[facility] Clinical Pharmacy Services: Vancomycin Pharmacokinetic Initial Consult Note    Nelson Wang is a 79 y.o. male who is on day 2 of pharmacy to dose vancomycin for Empiric.    Consult Information  Consulting Provider: Eduardo  Planned Duration of Therapy: 7 days  Was Patient Receiving Prior to Admission/Consult?: No  Loading Dose Ordered or Given: 1250 mg on 25 at 0228  PK/PD Target: -600 mg/L.hr   Relevant ID History:     Staph spp, not aureus or lugdunensis. Identification by BCID2 PCR. Abnormal  24   BCID, PCR 2 Streptococcus spp, not A, B, or pneumoniae. Identification by BCID2 PCR. Abnormal         Results for orders placed or performed during the hospital encounter of 25   Blood Culture - Blood, Hand, Right    Specimen: Hand, Right; Blood   Result Value Ref Range    Blood Culture No growth at 24 hours       Other Antimicrobials: Piperacillin/Tazobactam    Imaging Reviewed?: Yes    Microbiology Data  MRSA PCR performed: No; Result: Not ordered due to excluded indication or presence of suspected abscess  Culture/Source:    Resp culture, aspirate ET suction: Mod growth normal resp lashae.    Blood- NGTD   Blood- NGTD    Vitals/Labs  Ht: 162.5 cm (63.98\"); Wt: 72 kg (158 lb 11.7 oz)  Temp (24hrs), Av °F (37.2 °C), Min:98.4 °F (36.9 °C), Max:99.4 °F (37.4 °C)   Estimated Creatinine Clearance: 19.2 mL/min (A) (by C-G formula based on SCr of 2.89 mg/dL (H)).  Hemodialysis - MWF schedule     Results from last 7 days   Lab Units 25  0449 25  0638 25  0349 25  0231   VANCOMYCIN RM mcg/mL 22.52  --   --   --    CREATININE mg/dL 2.89*  --  3.91* 4.00*   WBC 10*3/mm3 4.97 4.86  --  5.25     Assessment/Plan:  Dialysis MWF per renal note.  Vancomycin level this AM reported as 22.52.  No additional vancomycin will be provided at this time.  Random vancomycin level ordered for Monday AM prior to dialysis.     Pharmacy will follow patient's kidney " function and will adjust doses and obtain levels as necessary. Thank you for involving pharmacy in this patient's care. Please contact pharmacy with any questions or concerns.                           Arnie Gonzalez  Clinical Pharmacist

## 2025-01-18 NOTE — PROGRESS NOTES
[facility]   Hospitalist Progress Note    Date of admission: 1/11/2025  Patient Name: Nelson Wang  1945  Date: 1/18/2025      Subjective     No chief complaint on file.      Interval Followup: Had failed SBT yesterday evening passed this morning, Plan to extubate later today.  Remains confused but awake and agitated requiring restraints she is trying to pull the tube out.  Is unable to follow any commands for me at time of my evaluation.  Family at bedside updated    Objective     Vitals:   Temp:  [98.4 °F (36.9 °C)-99.4 °F (37.4 °C)] 99.4 °F (37.4 °C)  Heart Rate:  [67-82] 75  Resp:  [13-20] 13  BP: (105-168)/(54-73) 122/61  FiO2 (%):  [40 %-46 %] 40 %    Physical Exam  Ill-appearing in bed thin frail intubated, agitated/restless trying to pull ETT out, readjusted restraints  Coarse ventilator breath sounds, symmetric chest rise, no acute wheezing  Rrr No lower extremity pitting edema  Abd nondistended no apparent tenderness        Result Review:  Vital signs, labs and recent relevant imaging reviewed.      CBC          1/15/2025    04:33 1/17/2025    02:31 1/17/2025    06:38 1/18/2025    04:49   CBC   WBC 5.22  5.25  4.86  4.97    RBC 3.65  3.50  3.53  3.68    Hemoglobin 9.7  9.4  9.4  9.9    Hematocrit 33.2  32.5  32.9  32.1    MCV 91.0  92.9  93.2  87.2    MCH 26.6  26.9  26.6  26.9    MCHC 29.2  28.9  28.6  30.8    RDW 18.6  18.7  18.7  18.8    Platelets 177  159  141  133      CMP          1/15/2025    04:33 1/17/2025    02:31 1/17/2025    03:49 1/18/2025    04:49   CMP   Glucose 119  93  98  210    BUN 21  15  17  18    Creatinine 4.57  4.00  3.91  2.89    EGFR 12.4  14.6  15.0  21.6    Sodium 132  133  131  129    Potassium 4.1  4.0  4.0  4.0    Chloride 96  97  96  93    Calcium 9.7  9.6  9.4  8.9    Total Protein 7.0  6.5      Albumin 3.0  2.5  2.6  2.6    Globulin 4.0  4.0      Total Bilirubin 0.6  0.6      Alkaline Phosphatase 114  118      AST (SGOT) 35  29      ALT (SGPT) 10  9       Albumin/Globulin Ratio 0.8  0.6      BUN/Creatinine Ratio 4.6  3.8  4.3  6.2    Anion Gap 12.2  10.0  12.9  10.3          acetaminophen    albumin human    senna-docusate sodium **AND** polyethylene glycol **AND** [DISCONTINUED] bisacodyl **AND** bisacodyl    calcium carbonate    dextrose    dextrose    Diclofenac Sodium    glucagon (human recombinant)    guaifenesin    heparin    heparin    ipratropium-albuterol    Lidocaine    midodrine    ondansetron ODT    Pharmacy to dose vancomycin    polyethyl glycol-propyl glycol    senna-docusate sodium **AND** [DISCONTINUED] polyethylene glycol **AND** [DISCONTINUED] bisacodyl **AND** [DISCONTINUED] bisacodyl    sodium chloride    sodium chloride    sodium chloride    sodium chloride    [Held by provider] acetaminophen, 1,000 mg, Oral, Q8H  arformoterol, 15 mcg, Nebulization, BID - RT  artificial tears, , Both Eyes, 4x Daily  atorvastatin, 40 mg, Nasogastric, Daily  budesonide, 0.5 mg, Nebulization, BID - RT  famotidine, 10 mg, Intravenous, Daily  [Held by provider] famotidine, 10 mg, Oral, Daily  hydrocortisone sodium succinate, 50 mg, Intravenous, Q6H  ipratropium-albuterol, 3 mL, Nebulization, BID - RT  levothyroxine, 175 mcg, Nasogastric, Q AM  ofloxacin, 2 drop, Left Eye, 4x Daily  piperacillin-tazobactam, 3.375 g, Intravenous, Q12H  senna-docusate sodium, 2 tablet, Nasogastric, BID  [Held by provider] sevelamer, 800 mg, Oral, TID With Meals  sodium chloride, 10 mL, Intravenous, Q12H  sodium chloride, 10 mL, Intravenous, Q12H        XR Chest 1 View    Result Date: 1/17/2025  The study is ABNORMAL. The endotracheal (ET) tube tip is in the expected right mainstem bronchus. Consider retracting the endotracheal (ET) tube approximately 2 to 3 cm in order to optimize its positioning relative to the mel. Please see above comments for further detail.   Portions of this note were completed with a voice recognition program.  Electronically Signed By-Stan Hogan MD  On:1/17/2025 2:36 AM      EEG    Result Date: 1/13/2025  Underlying background suggest diffuse cerebral dysfunction of moderate degree, most commonly seen due to toxic/metabolic cause The triphasic sharp waves are abnormal but nonspecific.  These are most commonly seen due to significant toxic/metabolic disarray, most notably liver or kidney dysfunction.  They may also be seen as a side effect of certain medications such as cefepime.  Less likely, they can also be found on the ictal/interictal spectrum This report is transcribed using the Dragon dictation system.      XR Chest 1 View    Result Date: 1/12/2025  Increased bilateral infiltrates are seen. The findings may represent infectious multifocal pneumonia. Aspiration pneumonia cannot be excluded. Pulmonary edema is possible. A combination of these differential considerations may exist. Please see above comments for further detail.    Portions of this note were completed with a voice recognition program.  Electronically Signed By-Stan Hogan MD On:1/12/2025 11:24 PM      CT Head Without Contrast Stroke Protocol    Result Date: 1/12/2025  Impression: Stable examination without acute intracranial process. Electronically Signed: Margaret Hammond MD  1/12/2025 10:57 AM EST  Workstation ID: [id number]    CT Pelvis Without Contrast    Result Date: 1/11/2025  There is a suspected acute-to-subacute nondisplaced periprosthetic fracture involving the proximal right femur, as discussed. No other acute fractures are seen. No dislocation.   Portions of this note were completed with a voice recognition program.  Electronically Signed ByCorie Hogan MD On:1/11/2025 9:15 PM         EEG - IMPRESSION:   Underlying background suggest diffuse cerebral dysfunction of moderate degree, most commonly seen due to toxic/metabolic cause   The triphasic sharp waves are abnormal but nonspecific.  These are most commonly seen due to significant toxic/metabolic disarray, most notably liver  or kidney dysfunction.  They may also be seen as a side effect of certain medications such as cefepime.  Less likely, they can also be found on the ictal/interictal spectrum    Assessment / Plan     Summary: 79 y.o. ESRD on dialysis, type 2 diabetes, dementia, discitis having completed vancomycin, A-fib, restrictive lung disease.  Patient recently discharged following long hospitalization, admitted from 12/5/2024 through 1/8/2025.  Patient discharged home with in-home dialysis.  Unfortunately today patient's dialysis machine not working.  Patient's primary nephrologist was contacted, unable to get patient into other outpatient dialysis facilities.  Therefore, decision was made to direct admit patient into the hospital for dialysis as he has not had dialysis for 3 days.  Hospitalist service contacted for admission orders, nephrology consulted for dialysis needs.      RRT called morning of 12th.  Patient was found to have fixed gaze to the right.  Patient only responding to painful stimuli minimally.  Stat CT obtained showing stable examination without acute intracranial process.  Teleneurology consulted during stroke RRT.  Felt altered mental status and confusion most likely secondary to toxic or metabolic etiology.  During stroke RRT patient noted to be hypotensive, while fluid bolus running, daughter endorses improvement in physical exam.  Patient was transferred to a telemetry bed, blood pressures again started drifting down therefore felt safest to transfer patient to the ICU for pressors if needed.     Patient's CT scan of pelvis does reveal right periprosthetic proximal femur fracture.  Orthopedic surgery consulted and able to talk with patient and daughter at bedside.  At this time plan is for conservative measures, continue nonoperative treatment with toe-touch as able, continue to work with PT and OT and follow-up in clinic clos.  Had worsening respiratory status and hypercapnia and was intubated overnight  on 1/16.  Continued goals of care discussion    Assessment/Plan (clinically significant if listed here)  Acute hypoxemic hypercapnic respiratory failure requiring mechanical ventilation  Encephalopathy suspect multifactorial with dementia, toxic/metabolic encephalopathy and possible hypoactive delirium  End-stage renal disease on dialysis  NSTEMI suspect type II from demand ischemia  Hypotension  Adrenal insufficiency significantly low random cortisol level  Possible pneumonia  Type 2 diabetes  Acute to subacute nondisplaced periprosthetic fracture involving proximal right femur, managing nonoperatively  Dementia  Discitis, completed course of vancomycin  Atrial fibrillation not on anticoagulation due to falls  Restrictive lung disease    Repeat limited echo with 50 to 60%, no obvious wall motion abnormalities    Past repeat SBT this morning, plan for extubation later today, monitor closely  Patient remains DNR/DNI and plan to not reintubated for to have worsening respiratory issues following extubation.  Continue close care as per discussions with family.    Cortisol notably low continue with stress dose steroids, slowly taper  Continuing empiric antibiotics with Vanco and Zosyn for now narrow further based on cultures  Continuing with heparin drip empirically for now given significant troponinemia, echo did not show any acute wall abnormalities.  Had relatively clean cath back in 5/2024.  Suspect hypertension related troponinemia  Continue dialysis as able per nephrology  Continue tube feeds.  Core track.    Schedule Tylenol via NG tube to try and help with pain, try to minimize any sedating medications as able  Continue as needed pain medication, Nonoperative management of fracture per Ortho, monitor periodically with imaging outpatient, appreciate assistance - wbat, toe-touch as tolerated   Cont seroquel at night   Cont synthroid  Cont statin  Dnr/dni, cont goc discussions and supportive care/palliative care  discussions  Remains critically ill in the ICU, possibly ultimately going home with hospice depending on continued course  F/u am cbc, bmp, mg, phos, ptt monitoring    Dispo: home with family/caregivers once medically stable.   D/w SW    VTE Prophylaxis:  Pharmacologic & mechanical VTE prophylaxis orders are present.

## 2025-01-18 NOTE — PROGRESS NOTES
Pulmonary / Critical Care Progress Note      Patient Name: Nelson Wang  : 1945  MRN: [id number]  Attending:  Devante Rosado MD   Date of admission: 2025    Subjective   Subjective   Critically ill with respiratory failure    Patient remains on mechanical ventilator  Cortisol noted to be 1  Placed on stress dose steroids  Did have bump in troponin  Echocardiogram with no wall motion abnormalities    Placed on pressure support this morning  Patient on standby  Intermittently following commands  Blood pressure improved, not requiring vasopressors    Objective   Objective     Vitals:   Vital signs for last 24 hours:  Temp:  [97.6 °F (36.4 °C)-99.2 °F (37.3 °C)] 99 °F (37.2 °C)  Heart Rate:  [56-82] 69  Resp:  [20] 20  BP: ()/(47-73) 105/54  FiO2 (%):  [40 %-51 %] 40 %    Intake/Output last 3 shifts:  I/O last 3 completed shifts:  In: 3201.3 [I.V.:1954.3; Other:561; NG/GT:686]  Out: 500     Vent settings for last 24 hours:  Mode: VC/AC  FiO2 (%):  [40 %-51 %] 40 %  S RR:  [20] 20  S VT:  [400 mL] 400 mL  PEEP/CPAP (cm H2O):  [5 cm H20] 5 cm H20  MAP (cm H2O):  [9.4-11] 10    Physical Exam:  Vital Signs Reviewed   General: Frail critically ill-appearing male intubated and awake on ventilator  HEENT:  PERRL, EOMI.  OP with endotracheal tube  Chest: diminished with crackles bilaterally, tympanic to percussion bilaterally, synchronous with ventilator  CV: RRR, no MGR, pulses 2+, equal.  Abd:  Soft, NT, ND, + BS, no HSM  EXT:  no clubbing, no cyanosis, no edema  Neuro: Intubated and awake, grimacing to pain, CN grossly intact, no focal deficits.    Skin: No rashes or lesions noted      Result Review    Result Review:  I have personally reviewed the results from the time of this admission to 2025 07:10 EST and agree with these findings:  [x]  Laboratory  [x]  Microbiology  [x]  Radiology  [x]  EKG/Telemetry   [x]  Cardiology/Vascular   []  Pathology  []  Old records  []  Other:  Most notable  findings include:       Lab 01/18/25  0449 01/17/25  0638 01/17/25  0349 01/17/25  0231 01/15/25  0433 01/14/25  0318 01/13/25  0311 01/12/25  1030 01/12/25  0734   WBC 4.97 4.86  --  5.25 5.22 6.58 6.81 7.11 7.79   HEMOGLOBIN 9.9* 9.4*  --  9.4* 9.7* 9.9* 10.9* 10.9* 10.4*   HEMATOCRIT 32.1* 32.9*  --  32.5* 33.2* 34.6* 37.9 37.4* 35.5*   PLATELETS 133* 141  --  159 177 187 226 237 209   SODIUM 129*  --  131* 133* 132* 134* 136  --  130*  130*   POTASSIUM 4.0  --  4.0 4.0 4.1 4.2 5.3*  --  4.6  4.6   CHLORIDE 93*  --  96* 97* 96* 99 100  --  94*  94*   CO2 25.7  --  22.1 26.0 23.8 24.7 24.6  --  26.9  27.9   BUN 18  --  17 15 21 14 29*  --  21  21   CREATININE 2.89*  --  3.91* 4.00* 4.57* 3.01* 4.68*  --  3.81*  3.66*   GLUCOSE 210*  --  98 93 119* 153* 77  --  112*  113*   CALCIUM 8.9  --  9.4 9.6 9.7 9.2 9.7  --  10.2  10.0   PHOSPHORUS 2.2*  --  4.6* 5.2* 4.0 3.3 6.3*  --  4.4   TOTAL PROTEIN  --   --   --  6.5 7.0 7.1  --   --  6.8   ALBUMIN 2.6*  --  2.6* 2.5* 3.0* 3.0*  --   --  3.0*   GLOBULIN  --   --   --  4.0 4.0 4.1  --   --  3.8     CT Head Without Contrast Stroke Protocol    Result Date: 1/12/2025  CT HEAD WO CONTRAST STROKE PROTOCOL Date of Exam: 1/12/2025 10:31 AM EST Indication: stroke r/o. Comparison: CT head 12/30/2024 Technique: Axial CT images were obtained of the head without contrast administration.  Reconstructed coronal and sagittal images were also obtained. Automated exposure control and iterative construction methods were used. Scan Time: 10:43 a.m. Results discussed with Nathaniel at 10:56 a.m.. Findings: There is no evidence of acute infarction. There there is no acute intracranial hemorrhage. There are no extra-axial collections. Ventricles and CSF spaces are symmetric. No mass effect nor hydrocephalus. There are periventricular white matter changes and cerebral atrophy which appears age-related.  There is fluid opacification of the right mastoid air cells. Paranasal sinuses  appear aerated.  Osseous structures and orbits appear intact. There is some soft tissue prominence along the right posterior calvarium which may represent a small hematoma.     Impression: Impression: Stable examination without acute intracranial process. Electronically Signed: Margaret Hammond MD  1/12/2025 10:57 AM EST  Workstation ID: [id number]    CT Head Without Contrast    Result Date: 12/30/2024  CT HEAD WO CONTRAST Date of Exam: 12/30/2024 10:23 PM EST Indication: altered mental status. Comparison: CT head without contrast 12/28/2024 Technique: Axial CT images were obtained of the head without contrast administration.  Reconstructed coronal and sagittal images were also obtained. Automated exposure control and iterative construction methods were used. Findings: Image quality slightly degraded due to motion. There is no intracranial hemorrhage. No mass effect or midline shift. Generalized cerebral volume loss unchanged. Moderate white matter findings suggesting chronic microvascular disease. No intraventricular hemorrhage. Posterior fossa without acute abnormality. No abnormal extra-axial fluid collection. The globes are symmetric. There is no retro-orbital abnormality. The mastoid air cells are well-aerated on the left. Small amount of fluid in the right mastoid air cells. No sinus fluid level. Negative for calvarial fracture.     Impression: Impression: 1. No intracranial hemorrhage or acute intracranial abnormality.. 2. Generalized cerebral atrophy with moderate white matter findings of chronic microvascular disease. Electronically Signed: Lasha Ramos MD  12/30/2024 10:38 PM EST  Workstation ID: [id number]    CT Head Without Contrast    Result Date: 12/28/2024  CT CERVICAL SPINE WO CONTRAST-, CT HEAD WO CONTRAST-  (COMBINED REPORT)  Date of exam: 12/28/2024, 10:12 P.M.  Indication: fall; j96.01-acute respiratory failure with hypoxia; j81.0-acute pulmonary edema; r26.2-difficulty in walking, not elsewhere  classified; r13.12-dysphagia, oropharyngeal phase  Comparisons: 12/13/2024; 11/2/2024.  TECHNIQUE: Axial CT images were obtained of the head & cervical spine without contrast administration. Reconstructed 2D (two-dimensional) coronal and sagittal images were also obtained. Automated exposure control and iterative construction methods were used. Additionally, the radiation dose reduction device was turned on for each scan per the ALARA (As Low as Reasonably Achievable) protocol. Please see the electronic medical record, EMR (i.e., Epic), for the documented radiation dose.  EXAM FINDINGS:   HEAD CT: A routine nonenhanced head CT was performed. No acute brain abnormality is seen. No acute infarct. No acute intracranial hemorrhage. No midline shift or acute intracranial mass effect is seen. The ventricular system and the extra-axial spaces are prominent, suggesting central atrophy. There is suspected moderate-to-severe chronic small vessel ischemic disease. Arterial calcifications are seen. No acute skull fracture is identified. The patient has undergone bilateral cataract extractions with intra-ocular lens implants. Age-indeterminate congestive and/or inflammatory changes involve the right mastoid air cell complex, seen previously. Grossly, no significant acute findings are seen within the imaged paranasal sinuses. There is a suspected mild acute scalp contusion along the high right parietal region. The study is motion limited.  CONCLUSION: No acute brain abnormality is seen. No acute intracranial hemorrhage. No acute skull fracture.   CERVICAL SPINE CT: A routine nonenhanced cervical spine CT was performed. Again, sagittal and coronal two-dimensional (2D) reformations are provided for review. The study is motion-limited. Grossly, no acute cervical spine fracture or acute malalignment is identified. Moderate-to-severe degenerative changes are seen at multiple levels with chronic malalignment. There is extensive  ossification of the posterior longitudinal ligament (OPLL), especially at C2-3. Small nonspecific bilateral cervical lymph nodes are seen. There are arterial calcifications. If symptoms or clinical concerns persist, consider imaging follow-up.  CONCLUSION: Grossly, no acute cervical spine fracture is seen. Grossly, no acute subluxation abnormality. The study is motion-limited.    Portions of this note were completed with a voice recognition program.  Electronically Signed By-Stan Hogan MD On:12/28/2024 11:23 PM       Results for orders placed during the hospital encounter of 01/11/25    Adult Transthoracic Echo Limited W/ Cont if Necessary Per Protocol    Interpretation Summary    Left ventricular ejection fraction appears to be 56 - 60%.  No obvious wall motion abnormalities.    Some type of catheter appears in the right atrium.    This is a limited echo for ejection fraction.      EEG with features consistent with encephalopathy    Cortisol 1.30    Assessment & Plan   Assessment / Plan     Active Hospital Problems:  Active Hospital Problems    Diagnosis     **ESRD (end stage renal disease) on dialysis     Moderate protein-calorie malnutrition      Impression:  Altered mental status  Acute hypoxemic and hypercapnic respiratory failure require mechanical ventilation  Toxic/metabolic encephalopathy  CO2 narcosis  Toxic/metabolic encephalopathy possible hypoactive delirium  Hypotension  NSTEMI, likely from demand ischemia/type II  End-stage renal disease on hemodialysis  Chronic compensated diastolic congestive heart failure  Possible sepsis  Stroke rule out  Type 2 diabetes, now with hypoglycemia  Discitis status post course of vancomycin  Atrial fibrillation  Small chronic right pleural effusion  Femur fracture relative adrenal insufficiency        Plan:  -Will extubate to NIPPV 12/6  -Discontinue propofol and fentanyl  -Not requiring vasopressors  -Continue stress dose steroids, cortisol at 1.3 consistent with  adrenal insufficiency  -Continue Synthroid  -Echocardiogram with EF 56 to 60% no evidence of wall motion abnormalities  -Troponin slowly trending down, will recheck  -Heparin per ACS   -Pro-Niels unremarkable, continue Zosyn, aspiratory culture in process, blood culture NGTD  -Agree with empiric antibiotics.  Check procalcitonin and culture patient  -Dialysis today per nephrology.  Appreciate assistance  -EEG earlier this hospitalization consistent with encephalopathy  -Has Cortrak.  Tube feeds at goal per dietitian recommendations     DNR/DNI now.  Prognosis very poor    VTE Prophylaxis:  Pharmacologic & mechanical VTE prophylaxis orders are present.    CODE STATUS:   Code Status (Patient has no pulse and is not breathing): No CPR (Do Not Attempt to Resuscitate)  Medical Interventions (Patient has pulse or is breathing): Full Support      Patient is critically ill with CO2 narcosis, respiratory failure requiring mechanical ventilation, NSTEMI, end-stage renal disease, altered mental status. We have personally reviewed all pertinent labs, imaging, microbiology and documentation. We have discussed care with the primary service as well as at multidisciplinary critical care rounds with the bedside nurse, respiratory therapist, pharmacist and all other ancillary services.36 minutes of critical care time was spent managing this patient, excluding procedures. Of this time, I spent 22 minutes in accordance with split shared billing.    I, BECKY Santana, spent 14 minutes critical care time.  Electronically signed by BECKY Albert, 01/18/25, 3:21 PM EST.    Electronically signed by Scottie Valentin MD, 01/18/25, 4:13 PM EST.

## 2025-01-18 NOTE — PLAN OF CARE
Goal Outcome Evaluation:              Outcome Evaluation: Patient extubated to BIPAP today and weaned off to 2L NC. BIPAP to be worn at night and as needed. TF infusing and to be advanced overnight. Family at bedside. VSS.

## 2025-01-18 NOTE — PLAN OF CARE
Goal Outcome Evaluation:  Plan of Care Reviewed With: patient, child, family        Progress: no change        All sedation off at 0300. Pt was apneic when placed on pressure support after 0500. Pt placed back on AC per respiratory. Other wise vital signs stable cm sr with pac's. Only drip is Heparin drip. Sats 99% on vent Fio2 40%

## 2025-01-19 LAB
ANION GAP SERPL CALCULATED.3IONS-SCNC: 12.6 MMOL/L (ref 5–15)
APTT PPP: 52.3 SECONDS (ref 78–95.9)
BACTERIA SPEC RESP CULT: NORMAL
BASOPHILS # BLD AUTO: 0.02 10*3/MM3 (ref 0–0.2)
BASOPHILS NFR BLD AUTO: 0.3 % (ref 0–1.5)
BUN SERPL-MCNC: 35 MG/DL (ref 8–23)
BUN/CREAT SERPL: 8.7 (ref 7–25)
CALCIUM SPEC-SCNC: 9 MG/DL (ref 8.6–10.5)
CHLORIDE SERPL-SCNC: 93 MMOL/L (ref 98–107)
CO2 SERPL-SCNC: 25.4 MMOL/L (ref 22–29)
CREAT SERPL-MCNC: 4.02 MG/DL (ref 0.76–1.27)
DEPRECATED RDW RBC AUTO: 62 FL (ref 37–54)
EGFRCR SERPLBLD CKD-EPI 2021: 14.5 ML/MIN/1.73
EOSINOPHIL # BLD AUTO: 0 10*3/MM3 (ref 0–0.4)
EOSINOPHIL NFR BLD AUTO: 0 % (ref 0.3–6.2)
ERYTHROCYTE [DISTWIDTH] IN BLOOD BY AUTOMATED COUNT: 19 % (ref 12.3–15.4)
GLUCOSE BLDC GLUCOMTR-MCNC: 182 MG/DL (ref 70–99)
GLUCOSE BLDC GLUCOMTR-MCNC: 196 MG/DL (ref 70–99)
GLUCOSE BLDC GLUCOMTR-MCNC: 199 MG/DL (ref 70–99)
GLUCOSE BLDC GLUCOMTR-MCNC: 216 MG/DL (ref 70–99)
GLUCOSE SERPL-MCNC: 202 MG/DL (ref 65–99)
GRAM STN SPEC: NORMAL
HCT VFR BLD AUTO: 28.6 % (ref 37.5–51)
HGB BLD-MCNC: 8.7 G/DL (ref 13–17.7)
IMM GRANULOCYTES # BLD AUTO: 0.03 10*3/MM3 (ref 0–0.05)
IMM GRANULOCYTES NFR BLD AUTO: 0.5 % (ref 0–0.5)
LYMPHOCYTES # BLD AUTO: 0.75 10*3/MM3 (ref 0.7–3.1)
LYMPHOCYTES NFR BLD AUTO: 11.5 % (ref 19.6–45.3)
MAGNESIUM SERPL-MCNC: 2 MG/DL (ref 1.6–2.4)
MCH RBC QN AUTO: 26.9 PG (ref 26.6–33)
MCHC RBC AUTO-ENTMCNC: 30.4 G/DL (ref 31.5–35.7)
MCV RBC AUTO: 88.5 FL (ref 79–97)
MONOCYTES # BLD AUTO: 0.34 10*3/MM3 (ref 0.1–0.9)
MONOCYTES NFR BLD AUTO: 5.2 % (ref 5–12)
NEUTROPHILS NFR BLD AUTO: 5.36 10*3/MM3 (ref 1.7–7)
NEUTROPHILS NFR BLD AUTO: 82.5 % (ref 42.7–76)
NRBC BLD AUTO-RTO: 0 /100 WBC (ref 0–0.2)
PHOSPHATE SERPL-MCNC: 2.5 MG/DL (ref 2.5–4.5)
PLATELET # BLD AUTO: 145 10*3/MM3 (ref 140–450)
PMV BLD AUTO: 10 FL (ref 6–12)
POTASSIUM SERPL-SCNC: 3.4 MMOL/L (ref 3.5–5.2)
RBC # BLD AUTO: 3.23 10*6/MM3 (ref 4.14–5.8)
SODIUM SERPL-SCNC: 131 MMOL/L (ref 136–145)
WBC NRBC COR # BLD AUTO: 6.5 10*3/MM3 (ref 3.4–10.8)

## 2025-01-19 PROCEDURE — 25010000002 HYDROCORTISONE SOD SUC (PF) 100 MG RECONSTITUTED SOLUTION: Performed by: NURSE PRACTITIONER

## 2025-01-19 PROCEDURE — 94761 N-INVAS EAR/PLS OXIMETRY MLT: CPT

## 2025-01-19 PROCEDURE — 94664 DEMO&/EVAL PT USE INHALER: CPT

## 2025-01-19 PROCEDURE — 99233 SBSQ HOSP IP/OBS HIGH 50: CPT | Performed by: INTERNAL MEDICINE

## 2025-01-19 PROCEDURE — 97161 PT EVAL LOW COMPLEX 20 MIN: CPT

## 2025-01-19 PROCEDURE — 83735 ASSAY OF MAGNESIUM: CPT | Performed by: INTERNAL MEDICINE

## 2025-01-19 PROCEDURE — 84100 ASSAY OF PHOSPHORUS: CPT | Performed by: INTERNAL MEDICINE

## 2025-01-19 PROCEDURE — 99291 CRITICAL CARE FIRST HOUR: CPT | Performed by: INTERNAL MEDICINE

## 2025-01-19 PROCEDURE — 25010000002 HYDROCORTISONE SOD SUC (PF) 100 MG RECONSTITUTED SOLUTION: Performed by: INTERNAL MEDICINE

## 2025-01-19 PROCEDURE — 82948 REAGENT STRIP/BLOOD GLUCOSE: CPT

## 2025-01-19 PROCEDURE — 82948 REAGENT STRIP/BLOOD GLUCOSE: CPT | Performed by: PHYSICIAN ASSISTANT

## 2025-01-19 PROCEDURE — 94668 MNPJ CHEST WALL SBSQ: CPT

## 2025-01-19 PROCEDURE — 94799 UNLISTED PULMONARY SVC/PX: CPT

## 2025-01-19 PROCEDURE — 63710000001 INSULIN LISPRO (HUMAN) PER 5 UNITS: Performed by: INTERNAL MEDICINE

## 2025-01-19 PROCEDURE — 85730 THROMBOPLASTIN TIME PARTIAL: CPT | Performed by: FAMILY MEDICINE

## 2025-01-19 PROCEDURE — 80048 BASIC METABOLIC PNL TOTAL CA: CPT | Performed by: INTERNAL MEDICINE

## 2025-01-19 PROCEDURE — 97530 THERAPEUTIC ACTIVITIES: CPT

## 2025-01-19 PROCEDURE — 85025 COMPLETE CBC W/AUTO DIFF WBC: CPT | Performed by: INTERNAL MEDICINE

## 2025-01-19 RX ORDER — IBUPROFEN 600 MG/1
1 TABLET ORAL
Status: DISCONTINUED | OUTPATIENT
Start: 2025-01-19 | End: 2025-01-25 | Stop reason: HOSPADM

## 2025-01-19 RX ORDER — INSULIN LISPRO 100 [IU]/ML
2-7 INJECTION, SOLUTION INTRAVENOUS; SUBCUTANEOUS
Status: DISCONTINUED | OUTPATIENT
Start: 2025-01-19 | End: 2025-01-20

## 2025-01-19 RX ORDER — ALBUMIN (HUMAN) 12.5 G/50ML
12.5 SOLUTION INTRAVENOUS AS NEEDED
Status: CANCELLED | OUTPATIENT
Start: 2025-01-20 | End: 2025-01-20

## 2025-01-19 RX ORDER — POTASSIUM CHLORIDE 1.5 G/1.58G
40 POWDER, FOR SOLUTION ORAL ONCE
Status: COMPLETED | OUTPATIENT
Start: 2025-01-19 | End: 2025-01-19

## 2025-01-19 RX ORDER — PANTOPRAZOLE SODIUM 40 MG/10ML
40 INJECTION, POWDER, LYOPHILIZED, FOR SOLUTION INTRAVENOUS
Status: DISCONTINUED | OUTPATIENT
Start: 2025-01-19 | End: 2025-01-24

## 2025-01-19 RX ORDER — NICOTINE POLACRILEX 4 MG
15 LOZENGE BUCCAL
Status: DISCONTINUED | OUTPATIENT
Start: 2025-01-19 | End: 2025-01-25 | Stop reason: HOSPADM

## 2025-01-19 RX ORDER — HYDROCORTISONE SODIUM SUCCINATE 100 MG/2ML
50 INJECTION INTRAMUSCULAR; INTRAVENOUS EVERY 8 HOURS
Status: DISCONTINUED | OUTPATIENT
Start: 2025-01-19 | End: 2025-01-20

## 2025-01-19 RX ORDER — DEXTROSE MONOHYDRATE 25 G/50ML
25 INJECTION, SOLUTION INTRAVENOUS
Status: DISCONTINUED | OUTPATIENT
Start: 2025-01-19 | End: 2025-01-25 | Stop reason: HOSPADM

## 2025-01-19 RX ADMIN — WHITE PETROLATUM 57.7 %-MINERAL OIL 31.9 % EYE OINTMENT: at 12:07

## 2025-01-19 RX ADMIN — OFLOXACIN 2 DROP: 3 SOLUTION OPHTHALMIC at 17:44

## 2025-01-19 RX ADMIN — ACETAMINOPHEN 650 MG: 160 SOLUTION ORAL at 08:08

## 2025-01-19 RX ADMIN — ACETAMINOPHEN 650 MG: 160 SOLUTION ORAL at 15:59

## 2025-01-19 RX ADMIN — IPRATROPIUM BROMIDE AND ALBUTEROL SULFATE 3 ML: 2.5; .5 SOLUTION RESPIRATORY (INHALATION) at 19:58

## 2025-01-19 RX ADMIN — LEVOTHYROXINE SODIUM 175 MCG: 75 TABLET ORAL at 05:40

## 2025-01-19 RX ADMIN — IPRATROPIUM BROMIDE AND ALBUTEROL SULFATE 3 ML: 2.5; .5 SOLUTION RESPIRATORY (INHALATION) at 06:51

## 2025-01-19 RX ADMIN — Medication 10 ML: at 08:09

## 2025-01-19 RX ADMIN — SENNOSIDES AND DOCUSATE SODIUM 2 TABLET: 50; 8.6 TABLET ORAL at 21:43

## 2025-01-19 RX ADMIN — ARFORMOTEROL TARTRATE 15 MCG: 15 SOLUTION RESPIRATORY (INHALATION) at 19:58

## 2025-01-19 RX ADMIN — WHITE PETROLATUM 57.7 %-MINERAL OIL 31.9 % EYE OINTMENT: at 08:08

## 2025-01-19 RX ADMIN — HYDROCORTISONE SODIUM SUCCINATE 50 MG: 100 INJECTION, POWDER, FOR SOLUTION INTRAMUSCULAR; INTRAVENOUS at 16:00

## 2025-01-19 RX ADMIN — FAMOTIDINE 10 MG: 10 INJECTION INTRAVENOUS at 08:08

## 2025-01-19 RX ADMIN — PANTOPRAZOLE SODIUM 40 MG: 40 INJECTION, POWDER, FOR SOLUTION INTRAVENOUS at 12:07

## 2025-01-19 RX ADMIN — Medication 10 ML: at 21:59

## 2025-01-19 RX ADMIN — OFLOXACIN 2 DROP: 3 SOLUTION OPHTHALMIC at 08:08

## 2025-01-19 RX ADMIN — HYDROCORTISONE SODIUM SUCCINATE 50 MG: 100 INJECTION, POWDER, FOR SOLUTION INTRAMUSCULAR; INTRAVENOUS at 04:20

## 2025-01-19 RX ADMIN — BUDESONIDE 0.5 MG: 0.5 INHALANT ORAL at 06:51

## 2025-01-19 RX ADMIN — WHITE PETROLATUM 57.7 %-MINERAL OIL 31.9 % EYE OINTMENT: at 21:44

## 2025-01-19 RX ADMIN — QUETIAPINE FUMARATE 12.5 MG: 25 TABLET ORAL at 21:43

## 2025-01-19 RX ADMIN — ARFORMOTEROL TARTRATE 15 MCG: 15 SOLUTION RESPIRATORY (INHALATION) at 06:51

## 2025-01-19 RX ADMIN — ATORVASTATIN CALCIUM 40 MG: 40 TABLET, FILM COATED ORAL at 08:08

## 2025-01-19 RX ADMIN — HYDROCORTISONE SODIUM SUCCINATE 50 MG: 100 INJECTION, POWDER, FOR SOLUTION INTRAMUSCULAR; INTRAVENOUS at 08:08

## 2025-01-19 RX ADMIN — WHITE PETROLATUM 57.7 %-MINERAL OIL 31.9 % EYE OINTMENT: at 17:44

## 2025-01-19 RX ADMIN — ACETAMINOPHEN 650 MG: 160 SOLUTION ORAL at 21:43

## 2025-01-19 RX ADMIN — POTASSIUM CHLORIDE 40 MEQ: 1.5 POWDER, FOR SOLUTION ORAL at 08:08

## 2025-01-19 RX ADMIN — BUDESONIDE 0.5 MG: 0.5 INHALANT ORAL at 19:58

## 2025-01-19 RX ADMIN — ACETAMINOPHEN 650 MG: 160 SOLUTION ORAL at 04:20

## 2025-01-19 RX ADMIN — INSULIN LISPRO 3 UNITS: 100 INJECTION, SOLUTION INTRAVENOUS; SUBCUTANEOUS at 17:44

## 2025-01-19 RX ADMIN — OFLOXACIN 2 DROP: 3 SOLUTION OPHTHALMIC at 21:44

## 2025-01-19 RX ADMIN — OFLOXACIN 2 DROP: 3 SOLUTION OPHTHALMIC at 12:07

## 2025-01-19 NOTE — PLAN OF CARE
Goal Outcome Evaluation:  Plan of Care Reviewed With: patient        Progress: improving  Outcome Evaluation: Pt presents with decreased functional mobility seconeary to current hospitalization. Skilled PT intervention is needed to address noted deficits in order to restore to PLOF.    Anticipated Discharge Disposition (PT): inpatient rehabilitation facility, skilled nursing facility

## 2025-01-19 NOTE — PROGRESS NOTES
[facility]   Hospitalist Progress Note    Date of admission: 1/11/2025  Patient Name: Nelson Wang  1945  Date: 1/19/2025      Subjective     No chief complaint on file.      Interval Followup: Much more alert today, answering some basic questions for me okay does have confusion still and is intermittently pulling at his NG.  Somewhat redirectable.  Family present is asking about food.  Denies any acute pain currently or shortness of air.    Objective     Vitals:   Temp:  [98.6 °F (37 °C)-99.8 °F (37.7 °C)] 98.6 °F (37 °C)  Heart Rate:  [79-93] 82  Resp:  [14-24] 18  BP: (108-174)/(54-84) 152/60  Flow (L/min) (Oxygen Therapy):  [2-4] 2    Physical Exam  Ill-appearing in bed thin frail but answering basic questions okay today still with intermittent levels of confusion  Mild rhonchi no wheezing on room air no acute respiratory distress   Rrr No lower extremity pitting edema  Abd nondistended nontender    Result Review:  Vital signs, labs and recent relevant imaging reviewed.      CBC          1/17/2025    02:31 1/17/2025    06:38 1/18/2025    04:49 1/19/2025    04:50   CBC   WBC 5.25  4.86  4.97  6.50    RBC 3.50  3.53  3.68  3.23    Hemoglobin 9.4  9.4  9.9  8.7    Hematocrit 32.5  32.9  32.1  28.6    MCV 92.9  93.2  87.2  88.5    MCH 26.9  26.6  26.9  26.9    MCHC 28.9  28.6  30.8  30.4    RDW 18.7  18.7  18.8  19.0    Platelets 159  141  133  145      CMP          1/17/2025    02:31 1/17/2025    03:49 1/18/2025    04:49 1/19/2025    04:50   CMP   Glucose 93  98  210  202    BUN 15  17  18  35    Creatinine 4.00  3.91  2.89  4.02    EGFR 14.6  15.0  21.6  14.5    Sodium 133  131  129  131    Potassium 4.0  4.0  4.0  3.4    Chloride 97  96  93  93    Calcium 9.6  9.4  8.9  9.0    Total Protein 6.5       Albumin 2.5  2.6  2.6     Globulin 4.0       Total Bilirubin 0.6       Alkaline Phosphatase 118       AST (SGOT) 29       ALT (SGPT) 9       Albumin/Globulin Ratio 0.6       BUN/Creatinine Ratio 3.8  4.3   6.2  8.7    Anion Gap 10.0  12.9  10.3  12.6          albumin human    senna-docusate sodium **AND** polyethylene glycol **AND** [DISCONTINUED] bisacodyl **AND** bisacodyl    calcium carbonate    dextrose    dextrose    Diclofenac Sodium    glucagon (human recombinant)    guaifenesin    ipratropium-albuterol    Lidocaine    midodrine    ondansetron ODT    polyethyl glycol-propyl glycol    senna-docusate sodium **AND** [DISCONTINUED] polyethylene glycol **AND** [DISCONTINUED] bisacodyl **AND** [DISCONTINUED] bisacodyl    sodium chloride    sodium chloride    sodium chloride    sodium chloride    acetaminophen, 650 mg, Nasogastric, Q6H  arformoterol, 15 mcg, Nebulization, BID - RT  artificial tears, , Both Eyes, 4x Daily  atorvastatin, 40 mg, Nasogastric, Daily  budesonide, 0.5 mg, Nebulization, BID - RT  hydrocortisone sodium succinate, 50 mg, Intravenous, Q8H  insulin lispro, 2-7 Units, Subcutaneous, BID AC  ipratropium-albuterol, 3 mL, Nebulization, BID - RT  levothyroxine, 175 mcg, Nasogastric, Q AM  ofloxacin, 2 drop, Left Eye, 4x Daily  pantoprazole, 40 mg, Intravenous, Q AM  QUEtiapine, 12.5 mg, Nasogastric, Nightly  senna-docusate sodium, 2 tablet, Nasogastric, BID  [Held by provider] sevelamer, 800 mg, Oral, TID With Meals  sodium chloride, 10 mL, Intravenous, Q12H  sodium chloride, 10 mL, Intravenous, Q12H        XR Chest 1 View    Result Date: 1/17/2025  The study is ABNORMAL. The endotracheal (ET) tube tip is in the expected right mainstem bronchus. Consider retracting the endotracheal (ET) tube approximately 2 to 3 cm in order to optimize its positioning relative to the mel. Please see above comments for further detail.   Portions of this note were completed with a voice recognition program.  Electronically Signed By-Stan Hogan MD On:1/17/2025 2:36 AM      EEG    Result Date: 1/13/2025  Underlying background suggest diffuse cerebral dysfunction of moderate degree, most commonly seen due to  toxic/metabolic cause The triphasic sharp waves are abnormal but nonspecific.  These are most commonly seen due to significant toxic/metabolic disarray, most notably liver or kidney dysfunction.  They may also be seen as a side effect of certain medications such as cefepime.  Less likely, they can also be found on the ictal/interictal spectrum This report is transcribed using the Dragon dictation system.      XR Chest 1 View    Result Date: 1/12/2025  Increased bilateral infiltrates are seen. The findings may represent infectious multifocal pneumonia. Aspiration pneumonia cannot be excluded. Pulmonary edema is possible. A combination of these differential considerations may exist. Please see above comments for further detail.    Portions of this note were completed with a voice recognition program.  Electronically Signed By-Stan Hogan MD On:1/12/2025 11:24 PM      CT Head Without Contrast Stroke Protocol    Result Date: 1/12/2025  Impression: Stable examination without acute intracranial process. Electronically Signed: Margaret Hammond MD  1/12/2025 10:57 AM EST  Workstation ID: [id number]    CT Pelvis Without Contrast    Result Date: 1/11/2025  There is a suspected acute-to-subacute nondisplaced periprosthetic fracture involving the proximal right femur, as discussed. No other acute fractures are seen. No dislocation.   Portions of this note were completed with a voice recognition program.  Electronically Signed ByCorie Hogan MD On:1/11/2025 9:15 PM         EEG - IMPRESSION:   Underlying background suggest diffuse cerebral dysfunction of moderate degree, most commonly seen due to toxic/metabolic cause   The triphasic sharp waves are abnormal but nonspecific.  These are most commonly seen due to significant toxic/metabolic disarray, most notably liver or kidney dysfunction.  They may also be seen as a side effect of certain medications such as cefepime.  Less likely, they can also be found on the  ictal/interictal spectrum    Assessment / Plan     Summary: 79 y.o. ESRD on dialysis, type 2 diabetes, dementia, discitis having completed vancomycin, A-fib, restrictive lung disease.  Patient recently discharged following long hospitalization, admitted from 12/5/2024 through 1/8/2025.  Patient discharged home with in-home dialysis.  Unfortunately today patient's dialysis machine not working.  Patient's primary nephrologist was contacted, unable to get patient into other outpatient dialysis facilities.  Therefore, decision was made to direct admit patient into the hospital for dialysis as he has not had dialysis for 3 days.  Hospitalist service contacted for admission orders, nephrology consulted for dialysis needs.      RRT called morning of 12th.  Patient was found to have fixed gaze to the right.  Patient only responding to painful stimuli minimally.  Stat CT obtained showing stable examination without acute intracranial process.  Teleneurology consulted during stroke RRT.  Felt altered mental status and confusion most likely secondary to toxic or metabolic etiology.  During stroke RRT patient noted to be hypotensive, while fluid bolus running, daughter endorses improvement in physical exam.  Patient was transferred to a telemetry bed, blood pressures again started drifting down therefore felt safest to transfer patient to the ICU for pressors if needed.     Patient's CT scan of pelvis does reveal right periprosthetic proximal femur fracture.  Orthopedic surgery consulted and able to talk with patient and daughter at bedside.  At this time plan is for conservative measures, continue nonoperative treatment with toe-touch as able, continue to work with PT and OT and follow-up in clinic clos.  Had worsening respiratory status and hypercapnia and was intubated overnight on 1/16.  Continued goals of care discussion    Assessment/Plan (clinically significant if listed here)  Acute hypoxemic hypercapnic respiratory  failure requiring mechanical ventilation  Encephalopathy suspect multifactorial with dementia, toxic/metabolic encephalopathy and possible hypoactive delirium  End-stage renal disease on dialysis  NSTEMI suspect type II from demand ischemia  Hypotension  Adrenal insufficiency significantly low random cortisol level  Possible pneumonia  History of diabetes, now diet controlled and A1c 5.6 most recently  Hydrocortisone related hyperglycemia not unexpected  Acute to subacute nondisplaced periprosthetic fracture involving proximal right femur, managing nonoperatively  Dementia  Discitis, completed course of vancomycin  Atrial fibrillation not on anticoagulation due to falls  Restrictive lung disease      Extubated, currently on room air, monitor closely, may need prn at night, monitor resp status closely, cont bph  Continue hydrocortisone stress dose for adrenal insufficiency, slow taper, cortisol was significantly low, monitor closely, will change famotidine to PPI while on high-dose for now for GI prophylaxis  Placed on low-dose SSI and twice daily for hyperglycemia with steroids for now, history of diabetes with A1c 5.6 most recently may be able to stop completely when steroids are tapered down  Wbc normal limits infectious studies negative, discussed with pulm, stop Vanco and Zosyn and monitor.  Off heparin drip, suspect troponemia from shock/adrenal insufficiency.  Repeat echo had no acute wall motion abnormalities. Had relatively clean cath back in 5/2024.  Continue dialysis as able per nephrology  Continue tube feeds.  Core track.  SLP evaluation, not safe currently but hopefully   Schedule Tylenol via NG tube to try and help with pain - does seem to be doing a bit better with this   Try to minimize any sedating medications as able  Nonoperative management of fracture per Ortho, monitor periodically with imaging outpatient, appreciate assistance - wbat, toe-touch as tolerated   Cont seroquel at night   Cont  synthroid  Cont statin  Dnr/dni, cont goc discussions and supportive care/palliative care discussions  Remains ill but showing some improvement. Transfer to monitored bed  F/u am cbc, bmp, mg, phos    Dispo: home with family/caregivers once medically stable.   D/w SW    VTE Prophylaxis:  Mechanical VTE prophylaxis orders are present.

## 2025-01-19 NOTE — THERAPY EVALUATION
Acute Care - Physical Therapy Initial Evaluation  BRIA Sullivan     Patient Name: Nelson Wang  : 1945  MRN: [id number]  Today's Date: 2025   Onset of Illness/Injury or Date of Surgery: 25  Visit Dx:     ICD-10-CM ICD-9-CM   1. Chronic respiratory failure with hypoxia  J96.11 518.83     799.02   2. Recurrent pneumonia  J18.9 486   3. Acute on chronic respiratory failure with hypoxia  J96.21 518.84     799.02   4. ESRD (end stage renal disease)  N18.6 585.6   5. Aspiration pneumonia, unspecified aspiration pneumonia type, unspecified laterality, unspecified part of lung  J69.0 507.0   6. Difficulty in walking  R26.2 719.7     Patient Active Problem List   Diagnosis    Essential hypertension    Bradycardia, sinus    Anemia due to chronic kidney disease    Hypothyroidism    Idiopathic gout    Proteinuria    Psoriasis    Thrombocytopenia    Type 2 diabetes mellitus without complication    CKD (chronic kidney disease), stage IV    Hyperlipidemia    Monoclonal gammopathy of unknown significance (MGUS)    Stage 5 chronic kidney disease    Chronic gout without tophus    Peritoneal dialysis catheter dysfunction    Medicare annual wellness visit, subsequent    Chronic cough    Peritonitis associated with peritoneal dialysis    Recurrent pneumonia    Acute on chronic respiratory failure with hypoxia    ESRD (end stage renal disease)    Aspiration pneumonitis    Sepsis    Type 2 diabetes mellitus, with long-term current use of insulin    GERD without esophagitis    Immunosuppression due to drug therapy    Bipolar disorder    Chronic respiratory failure with hypoxia    Shortness of breath    Abnormal stress test    ESRD on dialysis    Abnormal chest CT    Encounter for screening for malignant neoplasm of colon    History of colon polyps    Family history of colon cancer    Intractable pain    Abdominal pain    ESRD (end stage renal disease) on dialysis    AMS (altered mental status)    Hallucinations    LUQ pain     Discitis    Metabolic encephalopathy    Aspiration pneumonia    Discitis of lumbar region    Severe malnutrition    Dementia    Unspecified severe protein-calorie malnutrition    Hypoxia    Gram-positive bacteremia    Moderate protein-calorie malnutrition     Past Medical History:   Diagnosis Date    Abnormal stress test     Anemia     IRON INFUSIONS WITH DIALYSIS    Anesthesia     WITH HIP REPLACEMENT DAUGHTER BELIEVES HAD SVT 2000    Ankle pain, right     Anxiety and depression     Arthritis     CKD (chronic kidney disease), stage IV     DIALYSIS [id number] SHILEY IN RIGHT CHEST    Diabetes     GERD (gastroesophageal reflux disease)     Gout     High cholesterol     History of peritoneal dialysis     HL (hearing loss)     Hyperkalemia     Hypertension     Hypothyroidism     Insomnia     Night terrors     Psoriasis     Psoriasis     Sleep apnea     Sleep walking     Thrombocytopenia     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    Vitiligo      Past Surgical History:   Procedure Laterality Date    ABDOMINAL SURGERY      ANKLE SURGERY Right 11/1990    APPENDECTOMY N/A 1954    ARTERIOVENOUS FISTULA/SHUNT SURGERY Left 07/16/2024    Procedure: Creation of left arm arteriovenous fistula;  Surgeon: Moses Mir MD;  Location: [facility] MAIN OR;  Service: Vascular;  Laterality: Left;    BRONCHOSCOPY N/A 03/01/2024    Procedure: BRONCHOSCOPY WITH BAL AND WASHINGS;  Surgeon: Sandra Espinal MD;  Location: [facility] ENDOSCOPY;  Service: Pulmonary;  Laterality: N/A;  PNEUMONIA    BRONCHOSCOPY N/A 08/30/2024    Procedure: BRONCHOSCOPY WITH BALS AND WASHINGS;  Surgeon: Sandra Espinal MD;  Location: [facility] ENDOSCOPY;  Service: Pulmonary;  Laterality: N/A;  MUCOUS PLUGGING    CARDIAC CATHETERIZATION N/A 05/29/2024    Procedure: Left Heart Cath with possible coronary angioplasty;  Surgeon: Stephan Nichols MD;  Location: [facility] CATH INVASIVE LOCATION;  Service: Cardiology;  Laterality: N/A;    COLONOSCOPY N/A 06/2022     [facility]    ENDOSCOPY N/A 10/28/2024    Procedure: ESOPHAGOGASTRODUODENOSCOPY INSERTION OF LIGHTED INSTRUMENT TO VIEW ESOPHAGUS, STOMACH AND SMALL INTESTINE;  Surgeon: Kingsley Peraza MD;  Location: [facility] ENDOSCOPY;  Service: Gastroenterology;  Laterality: N/A;  Gastritis    FRACTURE SURGERY      HIP BIPOLAR REPLACEMENT Right 01/2000    INSERTION HEMODIALYSIS CATHETER Left 04/09/2024    Procedure: HEMODIALYSIS CATHETER INSERTION;  Surgeon: Jose Berry MD;  Location: [facility] OR;  Service: General;  Laterality: Left;    INSERTION HEMODIALYSIS CATHETER N/A 04/12/2024    Procedure: Tunneled hemodialysis catheter insertion;  Surgeon: Enrique Vinson MD;  Location: [facility] OR;  Service: Vascular;  Laterality: N/A;    INSERTION PERITONEAL DIALYSIS CATHETER N/A 03/27/2023    Procedure: LAPAROSCOPIC INSERTION PERITONEAL DIALYSIS CATHETER, LAPAROSCOPIC OMENTOPEXY WITH LYSIS OF ADHESIONS;  Surgeon: Jose Berry MD;  Location: [facility] OR;  Service: General;  Laterality: N/A;    INSERTION PERITONEAL DIALYSIS CATHETER Left 07/23/2023    Procedure: REVISION OF PERITONEAL DIALYSIS CATHETER;  Surgeon: Radha Oreilly MD;  Location: [facility] OR;  Service: General;  Laterality: Left;    JOINT REPLACEMENT      REMOVAL PERITONEAL DIALYSIS CATHETER N/A 04/09/2024    Procedure: REMOVAL PERITONEAL DIALYSIS CATHETER;  Surgeon: Jose Berry MD;  Location: [facility] MAIN OR;  Service: General;  Laterality: N/A;    RENAL BIOPSY Left 07/15/2022    UPPER GASTROINTESTINAL ENDOSCOPY      VASCULAR SURGERY      WRIST SURGERY      UNSURE WHICH SIDE DAUGHTER REPORTS HAD SEVERE  CUT FROM WINDOW AND THEY HAD TO DO RECONSTRUCTIVE SURGERY WITH VESSELS AND NERVES     PT Assessment (Last 12 Hours)       PT Evaluation and Treatment       Row Name 01/19/25 1300          Physical Therapy Time and Intention    Subjective Information complains of;fatigue  -DW     Document Type evaluation  -DW      Mode of Treatment individual therapy;physical therapy  -DW     Patient Effort fair  -DW     Symptoms Noted During/After Treatment fatigue  -DW       Row Name 01/19/25 1300          General Information    Patient Profile Reviewed yes  -DW     Onset of Illness/Injury or Date of Surgery 01/11/25  -DW     Referring Physician Devante Rosado  -DW     Patient Observations alert  -DW     Prior Level of Function all household mobility;community mobility;ADL's;min assist:;mod assist:  -DW     Equipment Currently Used at Home walker, rolling  -DW     Risks Reviewed patient and family:  -DW     Benefits Reviewed patient and family:;improve function;increase independence;increase strength;increase balance  -DW     Barriers to Rehab none identified  -       Row Name 01/19/25 1300          Previous Level of Function/Home Environm    BADLs, Previous Functional Level partial assistance  -DW     IADLs, Previous Functional Level partial assistance  -DW     Bed Mobility, Previous Functional Level partial assistance  -DW     Transfers, Previous Functional Level partial assistance;uses device or equipment  -DW     Household Ambulation, Previous Functional Level partial assistance;uses device or equipment  -DW     Community Ambulation, Previous Functional Level uses wheelchair  -DW       Row Name 01/19/25 1300          Living Environment    Current Living Arrangements home  -DW     People in Home child(laura), adult  -DW     Primary Care Provided by child(laura)  -       Row Name 01/19/25 1300          Home Use of Assistive/Adaptive Equipment    Equipment Currently Used at Home wheelchair;walker, standard;commode  -       Row Name 01/19/25 1300          Cognition    Orientation Status (Cognition) oriented to;person  -DW     Follows Commands (Cognition) does not follow one-step commands;physical/tactile prompts required;repetition of directions required;verbal cues/prompting required  -       Row Name 01/19/25 1300          Range of  Motion Comprehensive    General Range of Motion other (see comments)  unable to assess due to difficulty following verbal commands  -       Row Name 01/19/25 1300          Strength Comprehensive (MMT)    General Manual Muscle Testing (MMT) Assessment lower extremity strength deficits identified  -     Comment, General Manual Muscle Testing (MMT) Assessment unable to assess due to difficulty following verbal commands  -       Row Name 01/19/25 1300          Bed Mobility    Bed Mobility bed mobility (all) activities  -     All Activities, [place] (Bed Mobility) maximum assist (25% patient effort)  -     Bed Mobility, Safety Issues decreased use of arms for pushing/pulling;decreased use of legs for bridging/pushing;cognitive deficits limit understanding  -     Assistive Device (Bed Mobility) bed rails;head of bed elevated  -       Row Name 01/19/25 1300          Gait/Stairs (Locomotion)    Gait/Stairs Locomotion other (see comments)  Pt could not ambulate at time of evaluation  -       Row Name 01/19/25 1300          Safety Issues/Impairments Affecting Functional Mobility    Safety Issues Affecting Function (Mobility) ability to follow commands  -     Impairments Affecting Function (Mobility) balance;endurance/activity tolerance;pain;strength;cognition  -     Cognitive Impairments, Mobility Safety/Performance awareness, need for assistance  -       Row Name 01/19/25 1300          Balance    Balance Assessment sitting static balance  -     Static Sitting Balance maximum assist  -     Position, Sitting Balance sitting edge of bed  -       Row Name 01/19/25 1300          Plan of Care Review    Plan of Care Reviewed With patient  -     Progress improving  -     Outcome Evaluation Pt presents with decreased functional mobility seconeary to current hospitalization. Skilled PT intervention is needed to address noted deficits in order to restore to PLOF.  -       Row Name 01/19/25 1300           Positioning and Restraints    Pre-Treatment Position in bed  -DW     Post Treatment Position bed  -DW     In Bed call light within reach;encouraged to call for assist  -       Row Name 01/19/25 1300          Therapy Assessment/Plan (PT)    PT Diagnosis (PT) Difficulty in walking  -     Rehab Potential (PT) fair  -     Criteria for Skilled Interventions Met (PT) yes;meets criteria;skilled treatment is necessary  -     Therapy Frequency (PT) daily  -     Problem List (PT) problems related to;balance;mobility;strength  -     Activity Limitations Related to Problem List (PT) unable to ambulate safely;unable to transfer safely;BADLs not performed adequately or safely;IADLs not performed adequately or safely  -       Row Name 01/19/25 1300          PT Evaluation Complexity    History, PT Evaluation Complexity 1-2 personal factors and/or comorbidities  -     Examination of Body Systems (PT Eval Complexity) 1-2 elements  -     Clinical Decision Making (PT Evaluation Complexity) moderate complexity  -       Row Name 01/19/25 1300          Therapy Plan Review/Discharge Plan (PT)    Therapy Plan Review (PT) evaluation/treatment results reviewed  -       Row Name 01/19/25 1300          Physical Therapy Goals    Bed Mobility Goal Selection (PT) bed mobility, PT goal 1  -DW     Transfer Goal Selection (PT) transfer, PT goal 1  -DW     Gait Training Goal Selection (PT) gait training, PT goal 1  -       Row Name 01/19/25 1300          Bed Mobility Goal 1 (PT)    Activity/Assistive Device (Bed Mobility Goal 1, PT) bed mobility activities, all  -DW     [place] Level/Cues Needed (Bed Mobility Goal 1, PT) minimum assist (75% or more patient effort)  -     Time Frame (Bed Mobility Goal 1, PT) long term goal (LTG);10 days  -       Row Name 01/19/25 1300          Transfer Goal 1 (PT)    Activity/Assistive Device (Transfer Goal 1, PT) transfers, all  -DW     [place] Level/Cues Needed (Transfer  Goal 1, PT) minimum assist (75% or more patient effort)  -DW     Time Frame (Transfer Goal 1, PT) long term goal (LTG);10 days  -DW       Row Name 01/19/25 1300          Gait Training Goal 1 (PT)    Activity/Assistive Device (Gait Training Goal 1, PT) gait (walking locomotion);assistive device use  -DW     [place] Level (Gait Training Goal 1, PT) moderate assist (50-74% patient effort)  -DW     Distance (Gait Training Goal 1, PT) 20  -DW     Time Frame (Gait Training Goal 1, PT) long term goal (LTG);10 days  -DW               User Key  (r) = Recorded By, (t) = Taken By, (c) = Cosigned By      Initials Name Provider Type    Junior Amaral PT Physical Therapist                    Physical Therapy Education       Title: PT OT SLP Therapies (Done)       Topic: Physical Therapy (Done)       Point: Mobility training (Done)       Learning Progress Summary            Patient Acceptance, E,TB, VU by  at 1/19/2025 1316                      Point: Home exercise program (Done)       Learning Progress Summary            Patient Acceptance, E,TB, VU by DW at 1/19/2025 1316                      Point: Body mechanics (Done)       Learning Progress Summary            Patient Acceptance, E,TB, VU by DW at 1/19/2025 1316                      Point: Precautions (Done)       Learning Progress Summary            Patient Acceptance, E,TB, VU by  at 1/19/2025 1316                                      User Key       Initials Effective Dates Name Provider Type Discipline     04/25/21 -  Junior Ambrocio, JAYME Physical Therapist PT                  PT Recommendation and Plan  Anticipated Discharge Disposition (PT): inpatient rehabilitation facility, skilled nursing facility  Planned Therapy Interventions (PT): balance training, gait training, strengthening, transfer training  Therapy Frequency (PT): daily  Plan of Care Reviewed With: patient  Progress: improving  Outcome Evaluation: Pt presents with decreased functional mobility  seconeary to current hospitalization. Skilled PT intervention is needed to address noted deficits in order to restore to PLOF.   Outcome Measures       Row Name 01/19/25 1316             How much help from another person do you currently need...    Turning from your back to your side while in flat bed without using bedrails? 2  -DW      Moving from lying on back to sitting on the side of a flat bed without bedrails? 1  -DW      Moving to and from a bed to a chair (including a wheelchair)? 1  -DW      Standing up from a chair using your arms (e.g., wheelchair, bedside chair)? 1  -DW      Climbing 3-5 steps with a railing? 1  -DW      To walk in hospital room? 1  -DW      AM-PAC 6 Clicks Score (PT) 7  -DW         Functional Assessment    Outcome Measure Options AM-PAC 6 Clicks Basic Mobility (PT)  -DW                User Key  (r) = Recorded By, (t) = Taken By, (c) = Cosigned By      Initials Name Provider Type     Junior Ambrocio PT Physical Therapist                     Time Calculation:    PT Charges       Row Name 01/19/25 1317             Time Calculation    PT Received On 01/19/25  -DW      PT Goal Re-Cert Due Date 01/28/25  -DW         Timed Charges    58722 - PT Therapeutic Activity Minutes 8  -DW         Untimed Charges    PT Eval/Re-eval Minutes 15  -DW         Total Minutes    Timed Charges Total Minutes 8  -DW      Untimed Charges Total Minutes 15  -DW       Total Minutes 23  -DW                User Key  (r) = Recorded By, (t) = Taken By, (c) = Cosigned By      Initials Name Provider Type     Junior Ambrocio, JAYME Physical Therapist                  Therapy Charges for Today       Code Description Service Date Service Provider Modifiers Qty    00519357952 HC PT THERAPEUTIC ACT EA 15 MIN 1/19/2025 Junior Ambrocio, PT GP 1    97109912105 HC PT EVAL LOW COMPLEXITY 2 1/19/2025 Junior Ambrocio, PT GP 1            PT G-Codes  Outcome Measure Options: AM-PAC 6 Clicks Basic Mobility (PT)  AM-PAC 6 Clicks Score  (PT): 7    Junior Ambrocio, PT  1/19/2025

## 2025-01-19 NOTE — PLAN OF CARE
Goal Outcome Evaluation:  Plan of Care Reviewed With: patient        Progress: no change  Outcome Evaluation: Patient refused to wear bipap tonight. Family at bedside aware.

## 2025-01-19 NOTE — PLAN OF CARE
Problem: Adult Inpatient Plan of Care  Goal: Plan of Care Review  Outcome: Progressing  Flowsheets (Taken 1/19/2025 0455)  Progress: improving  Plan of Care Reviewed With: child     Problem: Restraint, Nonviolent  Goal: Absence of Harm or Injury  Outcome: Met     Problem: Mechanical Ventilation Invasive  Goal: Effective Communication  Outcome: Met  Goal: Optimal Device Function  Outcome: Met  Goal: Mechanical Ventilation Liberation  Outcome: Met  Intervention: Promote Extubation and Mechanical Ventilation Liberation  Recent Flowsheet Documentation  Taken 1/18/2025 2000 by Jessy Murdock, RN  Sleep/Rest Enhancement: family presence promoted  Goal: Optimal Nutrition Delivery  Outcome: Met  Goal: Absence of Device-Related Skin and Tissue Injury  Outcome: Met  Intervention: Maintain Skin and Tissue Health  Recent Flowsheet Documentation  Taken 1/18/2025 2000 by Jessy Murdock, RN  Device Skin Pressure Protection:   absorbent pad utilized/changed   pressure points protected  Goal: Absence of Ventilator-Induced Lung Injury  Outcome: Met   Goal Outcome Evaluation: Attempted multiple times but pt did not tolerate bipap overnight; daughter and RT aware. Became restless and agitated when applied. Tolerated 2L NC to maintain O2 sat >90% and patent airway. 1 BM. VSS not requiring intervention.  Remained out of restraints and safe from injury.     Plan of Care Reviewed With: child        Progress: improving

## 2025-01-19 NOTE — PROGRESS NOTES
"[facility] Clinical Pharmacy Services: Vancomycin Pharmacokinetic Initial Consult Note    Nelson Wang is a 79 y.o. male who is on day 3 of pharmacy to dose vancomycin for Empiric.    Consult Information  Consulting Provider: Eduardo  Planned Duration of Therapy: 7 days  Was Patient Receiving Prior to Admission/Consult?: No  Loading Dose Ordered or Given: 1250 mg on 25 at 0228  PK/PD Target: -600 mg/L.hr   Relevant ID History:     Staph spp, not aureus or lugdunensis. Identification by BCID2 PCR. Abnormal  24   BCID, PCR 2 Streptococcus spp, not A, B, or pneumoniae. Identification by BCID2 PCR. Abnormal       Results for orders placed or performed during the hospital encounter of 25   Blood Culture - Blood, Hand, Right    Specimen: Hand, Right; Blood   Result Value Ref Range    Blood Culture No growth at 2 days       Other Antimicrobials: Piperacillin/Tazobactam    Imaging Reviewed?: Yes    Microbiology Data  MRSA PCR performed: No; Result: Not ordered due to excluded indication or presence of suspected abscess  Culture/Source:    Resp culture, aspirate ET suction: Mod growth normal resp lashae.    Blood- NGTD   Blood- NGTD    Vitals/Labs  Ht: 162.5 cm (63.98\"); Wt: 72 kg (158 lb 11.7 oz)  Temp (24hrs), Av.1 °F (37.3 °C), Min:98.6 °F (37 °C), Max:99.8 °F (37.7 °C)   Estimated Creatinine Clearance: 13.8 mL/min (A) (by C-G formula based on SCr of 4.02 mg/dL (H)).  Hemodialysis - MWF schedule     Results from last 7 days   Lab Units 25  0450 25  0449 25  0638 25  0349   VANCOMYCIN RM mcg/mL  --  22.52  --   --    CREATININE mg/dL 4.02* 2.89*  --  3.91*   WBC 10*3/mm3 6.50 4.97 4.86  --      Assessment/Plan:  Dialysis MWF per renal note.  Vancomycin level yesterday reported as 22.52.  No additional vancomycin will be provided at this time.  Random vancomycin level ordered for Monday AM prior to dialysis.     Pharmacy will follow patient's kidney function " and will adjust doses and obtain levels as necessary. Thank you for involving pharmacy in this patient's care. Please contact pharmacy with any questions or concerns.                           Arnie Gonzalez  Clinical Pharmacist

## 2025-01-19 NOTE — PROGRESS NOTES
RT EQUIPMENT DEVICE RELATED - SKIN ASSESSMENT    RT Medical Equipment/Device:     NIV Mask:  Full-face    size: M    Skin Assessment:      Cheek:  Intact  Chin:  Intact  Nose:  Intact    Device Skin Pressure Protection:  Pressure points protected    Nurse Notification:  Kate Singleton, RRT

## 2025-01-19 NOTE — PROGRESS NOTES
[facility] Progress Note      Patient Name: Nelson Wang  : 1945  MRN: [id number]  Primary Care Physician:  Domingo Bravo MD  Date of admission: 2025    Subjective     Interval History:   F/u ESRD     Review of Systems:   More alert today  To move out ICU  Getting TFs     Objective     Vitals:   Temp:  [98.6 °F (37 °C)-99.8 °F (37.7 °C)] 98.6 °F (37 °C)  Heart Rate:  [79-93] 80  Resp:  [14-24] 18  BP: (108-174)/(54-84) 174/79  Flow (L/min) (Oxygen Therapy):  [2-4] 2    Intake/Output Summary (Last 24 hours) at 2025 1341  Last data filed at 2025 0500  Gross per 24 hour   Intake 1815.8 ml   Output --   Net 1815.8 ml       Physical Exam:    General Appearance: frail but more alert, DHT in place   Neck: supple, no JVD  Lungs: CTA bilat   Heart: RRR, normal S1 and S2  Abdomen: soft, nontender, nondistended  Extremities: no edema, cyanosis or clubbing      Scheduled Meds:     acetaminophen, 650 mg, Nasogastric, Q6H  arformoterol, 15 mcg, Nebulization, BID - RT  artificial tears, , Both Eyes, 4x Daily  atorvastatin, 40 mg, Nasogastric, Daily  budesonide, 0.5 mg, Nebulization, BID - RT  hydrocortisone sodium succinate, 50 mg, Intravenous, Q8H  insulin lispro, 2-7 Units, Subcutaneous, BID AC  ipratropium-albuterol, 3 mL, Nebulization, BID - RT  levothyroxine, 175 mcg, Nasogastric, Q AM  ofloxacin, 2 drop, Left Eye, 4x Daily  pantoprazole, 40 mg, Intravenous, Q AM  QUEtiapine, 12.5 mg, Nasogastric, Nightly  senna-docusate sodium, 2 tablet, Nasogastric, BID  [Held by provider] sevelamer, 800 mg, Oral, TID With Meals  sodium chloride, 10 mL, Intravenous, Q12H  sodium chloride, 10 mL, Intravenous, Q12H      IV Meds:        Results Reviewed:   I have personally reviewed the results from the time of this admission to 2025 13:41 EST     Results from last 7 days   Lab Units 25  0450 25  0449 25  0349 25  0231 01/15/25  0433 25  0318   SODIUM  mmol/L 131* 129* 131* 133* 132* 134*   POTASSIUM mmol/L 3.4* 4.0 4.0 4.0 4.1 4.2   CHLORIDE mmol/L 93* 93* 96* 97* 96* 99   CO2 mmol/L 25.4 25.7 22.1 26.0 23.8 24.7   BUN mg/dL 35* 18 17 15 21 14   CREATININE mg/dL 4.02* 2.89* 3.91* 4.00* 4.57* 3.01*   CALCIUM mg/dL 9.0 8.9 9.4 9.6 9.7 9.2   BILIRUBIN mg/dL  --   --   --  0.6 0.6 0.6   ALK PHOS U/L  --   --   --  118* 114 114   ALT (SGPT) U/L  --   --   --  9 10 11   AST (SGOT) U/L  --   --   --  29 35 41*   GLUCOSE mg/dL 202* 210* 98 93 119* 153*     Estimated Creatinine Clearance: 13.8 mL/min (A) (by C-G formula based on SCr of 4.02 mg/dL (H)).  Results from last 7 days   Lab Units 01/19/25  0450 01/18/25  0449 01/17/25  0349 01/17/25  0231   MAGNESIUM mg/dL 2.0 1.9  --  2.0   PHOSPHORUS mg/dL 2.5 2.2* 4.6* 5.2*         Results from last 7 days   Lab Units 01/19/25  0450 01/18/25  0449 01/17/25  0638 01/17/25  0231 01/15/25  0433   WBC 10*3/mm3 6.50 4.97 4.86 5.25 5.22   HEMOGLOBIN g/dL 8.7* 9.9* 9.4* 9.4* 9.7*   PLATELETS 10*3/mm3 145 133* 141 159 177     Results from last 7 days   Lab Units 01/17/25  0638   INR  1.07       Assessment / Plan     ASSESSMENT:  - ESRD, was on home dialysis prior to admission, has left upper extremity AV fistula for access.   Dialysis m/w/f currently, last treatment FRI with sofy UF (0.5L) but vol status stable.  Will pull more fluid tomorrow.  Mild hypokalemia present, K 3.4, already replaced.  Mild hyponatremia too, Na 131  - Type 2 diabetes with complications.  - Hypotension - resolved, now BP elevated 150 to 170 systolic range ; HR 80s  - Anemia of chronic kidney disease, close to goal  - Acute encephalopathy, mentation better today  -- Acute resp failure, extubated, on room air currently   -- R femur FX- ortho seeing  -- anemia of CKD, hgb down to 8.7  -- DNR status    PLAN:  HD tomorrow, 4K bath, remove 2L as tolerated   If BP remains high despite ultrafiltration may need to start antihypertensive   Ok with Tx out  ICU  Prognosis remains poor .  Goals of care discussions ongoing  D/W RN      Rigo Oliva MD  01/19/25  13:41 EST    [facility]  [phone]

## 2025-01-19 NOTE — PLAN OF CARE
Goal Outcome Evaluation:  Plan of Care Reviewed With: patient        Progress: no change  Outcome Evaluation: Patient is on room air this morning tolerating well. Continued with percussor CPT and Bipap on standby at bedside.

## 2025-01-19 NOTE — PROGRESS NOTES
Pulmonary / Critical Care Progress Note      Patient Name: Nelson Wang  : 1945  MRN: [id number]  Attending:  Devante Rosado MD   Date of admission: 2025    Subjective   Subjective   Critically ill with respiratory failure    Over the past 24 hours  Extubated to BiPAP  Transition to nasal cannula.  Currently on 2 L nasal cannula  Cortrak in place  More alert this a.m.      Objective   Objective     Vitals:   Vital signs for last 24 hours:  Temp:  [98.6 °F (37 °C)-99.8 °F (37.7 °C)] 98.6 °F (37 °C)  Heart Rate:  [75-93] 80  Resp:  [13-24] 18  BP: (108-174)/(54-84) 174/79    Intake/Output last 3 shifts:  I/O last 3 completed shifts:  In: 3725.8 [I.V.:1264.8; Other:1017; NG/GT:1444]  Out: 0     Vent settings for last 24 hours:       Physical Exam:  Vital Signs Reviewed   General: Frail critically ill-appearing male awake, NAD  HEENT:  PERRL, EOMI. MMM  Chest: diminished with crackles bilaterally, no work of breathing noted at rest  CV: RRR, no MGR, pulses 2+, equal.  Abd:  Soft, NT, ND, + BS, no HSM  EXT:  no clubbing, no cyanosis, no edema  Neuro: Awake, moving upper and lower extremities  Skin: No rashes or lesions noted      Result Review    Result Review:  I have personally reviewed the results from the time of this admission to 2025 09:57 EST and agree with these findings:  [x]  Laboratory  [x]  Microbiology  [x]  Radiology  [x]  EKG/Telemetry   [x]  Cardiology/Vascular   []  Pathology  []  Old records  []  Other:  Most notable findings include:       Lab 25  0450 25  0449 25  0638 25  0349 25  0231 01/15/25  0433 25  0318 25  0311   WBC 6.50 4.97 4.86  --  5.25 5.22 6.58 6.81   HEMOGLOBIN 8.7* 9.9* 9.4*  --  9.4* 9.7* 9.9* 10.9*   HEMATOCRIT 28.6* 32.1* 32.9*  --  32.5* 33.2* 34.6* 37.9   PLATELETS 145 133* 141  --  159 177 187 226   SODIUM 131* 129*  --  131* 133* 132* 134* 136   POTASSIUM 3.4* 4.0  --  4.0 4.0 4.1 4.2 5.3*   CHLORIDE 93* 93*  --   96* 97* 96* 99 100   CO2 25.4 25.7  --  22.1 26.0 23.8 24.7 24.6   BUN 35* 18  --  17 15 21 14 29*   CREATININE 4.02* 2.89*  --  3.91* 4.00* 4.57* 3.01* 4.68*   GLUCOSE 202* 210*  --  98 93 119* 153* 77   CALCIUM 9.0 8.9  --  9.4 9.6 9.7 9.2 9.7   PHOSPHORUS 2.5 2.2*  --  4.6* 5.2* 4.0 3.3 6.3*   TOTAL PROTEIN  --   --   --   --  6.5 7.0 7.1  --    ALBUMIN  --  2.6*  --  2.6* 2.5* 3.0* 3.0*  --    GLOBULIN  --   --   --   --  4.0 4.0 4.1  --      CT Head Without Contrast Stroke Protocol    Result Date: 1/12/2025  CT HEAD WO CONTRAST STROKE PROTOCOL Date of Exam: 1/12/2025 10:31 AM EST Indication: stroke r/o. Comparison: CT head 12/30/2024 Technique: Axial CT images were obtained of the head without contrast administration.  Reconstructed coronal and sagittal images were also obtained. Automated exposure control and iterative construction methods were used. Scan Time: 10:43 a.m. Results discussed with Nathaniel at 10:56 a.m.. Findings: There is no evidence of acute infarction. There there is no acute intracranial hemorrhage. There are no extra-axial collections. Ventricles and CSF spaces are symmetric. No mass effect nor hydrocephalus. There are periventricular white matter changes and cerebral atrophy which appears age-related.  There is fluid opacification of the right mastoid air cells. Paranasal sinuses appear aerated.  Osseous structures and orbits appear intact. There is some soft tissue prominence along the right posterior calvarium which may represent a small hematoma.     Impression: Impression: Stable examination without acute intracranial process. Electronically Signed: Margaret Hammond MD  1/12/2025 10:57 AM EST  Workstation ID: [id number]    CT Head Without Contrast    Result Date: 12/30/2024  CT HEAD WO CONTRAST Date of Exam: 12/30/2024 10:23 PM EST Indication: altered mental status. Comparison: CT head without contrast 12/28/2024 Technique: Axial CT images were obtained of the head without contrast  administration.  Reconstructed coronal and sagittal images were also obtained. Automated exposure control and iterative construction methods were used. Findings: Image quality slightly degraded due to motion. There is no intracranial hemorrhage. No mass effect or midline shift. Generalized cerebral volume loss unchanged. Moderate white matter findings suggesting chronic microvascular disease. No intraventricular hemorrhage. Posterior fossa without acute abnormality. No abnormal extra-axial fluid collection. The globes are symmetric. There is no retro-orbital abnormality. The mastoid air cells are well-aerated on the left. Small amount of fluid in the right mastoid air cells. No sinus fluid level. Negative for calvarial fracture.     Impression: Impression: 1. No intracranial hemorrhage or acute intracranial abnormality.. 2. Generalized cerebral atrophy with moderate white matter findings of chronic microvascular disease. Electronically Signed: Lasha Ramos MD  12/30/2024 10:38 PM EST  Workstation ID: [id number]     Results for orders placed during the hospital encounter of 01/11/25    Adult Transthoracic Echo Limited W/ Cont if Necessary Per Protocol    Interpretation Summary    Left ventricular ejection fraction appears to be 56 - 60%.  No obvious wall motion abnormalities.    Some type of catheter appears in the right atrium.    This is a limited echo for ejection fraction.      EEG with features consistent with encephalopathy    Cortisol 1.30    Assessment & Plan   Assessment / Plan     Active Hospital Problems:  Active Hospital Problems    Diagnosis     **ESRD (end stage renal disease) on dialysis     Moderate protein-calorie malnutrition      Impression:  Altered mental status  Acute hypoxemic and hypercapnic respiratory failure require mechanical ventilation  Toxic/metabolic encephalopathy  CO2 narcosis  Toxic/metabolic encephalopathy possible hypoactive delirium  Hypotension  NSTEMI, likely from demand  ischemia/type II  End-stage renal disease on hemodialysis  Chronic compensated diastolic congestive heart failure  Possible sepsis  Stroke rule out  Type 2 diabetes, now with hypoglycemia  Discitis status post course of vancomycin  Atrial fibrillation  Small chronic right pleural effusion  Femur fracture relative adrenal insufficiency        Plan:  -Continue supplemental O2.  Titrate to maintain SpO2 90% or greater  -Continue stress dose steroids, cortisol at 1.3 consistent with adrenal insufficiency.  Drop dose to hydrocortisone 50 mg IV every 8 hours  -Continue Synthroid  -Echocardiogram with EF 56 to 60% no evidence of wall motion abnormalities  -Troponin slowly trending down, will recheck  -Heparin per ACS.  Discontinue today  -Cultures are all negative.  Discontinue antibiotics.  Suspect hypotension was secondary to adrenal insufficiency  -DC BiPAP  -Dialysis per nephrology.  Appreciate assistance  -EEG earlier this hospitalization consistent with encephalopathy  -Cortrak in place.  Tube feeds at goal per dietitian recommendations  -SSI to optimize glucose control     GI prophylaxis with Protonix    DNR/DNI now.  Prognosis very poor    VTE Prophylaxis:  Pharmacologic & mechanical VTE prophylaxis orders are present.    CODE STATUS:   Medical Intervention Limits: No intubation (DNI)  Code Status (Patient has no pulse and is not breathing): No CPR (Do Not Attempt to Resuscitate)  Medical Interventions (Patient has pulse or is breathing): Limited Support      Patient is critically ill with CO2 narcosis, respiratory failure requiring mechanical ventilation, NSTEMI, end-stage renal disease, altered mental status. We have personally reviewed all pertinent labs, imaging, microbiology and documentation. We have discussed care with the primary service as well as at multidisciplinary critical care rounds with the bedside nurse, respiratory therapist, pharmacist and all other ancillary services.34 minutes of critical care  time was spent managing this patient, excluding procedures. Of this time, I spent 20 minutes in accordance with split shared billing.    I, Eri Alfonso PA-C, spent 14 minutes critical care time.  Electronically signed by EDSON Carter, 01/19/25, 2:33 PM EST.    Electronically signed by Scottie Valentin MD, 01/19/25, 3:44 PM EST.

## 2025-01-19 NOTE — PROGRESS NOTES
RT EQUIPMENT DEVICE RELATED - SKIN ASSESSMENT    RT Medical Equipment/Device:      Room Air    Skin Assessment:      Cheek:  Intact  Neck:  Intact  Nose:  Intact    Device Skin Pressure Protection:   None     Nurse Notification:  No    Janna Boles, RRT

## 2025-01-19 NOTE — PLAN OF CARE
Goal Outcome Evaluation:         Patient transferring to 3NT at this time. Tube feeds infusing at goal. Patient following commands and answering questions appropriately. Daughter at bedside. To get hemodialysis tomorrow. VSS.

## 2025-01-20 LAB
ANION GAP SERPL CALCULATED.3IONS-SCNC: 15 MMOL/L (ref 5–15)
BASOPHILS # BLD AUTO: 0.01 10*3/MM3 (ref 0–0.2)
BASOPHILS NFR BLD AUTO: 0.1 % (ref 0–1.5)
BUN SERPL-MCNC: 59 MG/DL (ref 8–23)
BUN/CREAT SERPL: 11.7 (ref 7–25)
CALCIUM SPEC-SCNC: 9.1 MG/DL (ref 8.6–10.5)
CHLORIDE SERPL-SCNC: 93 MMOL/L (ref 98–107)
CO2 SERPL-SCNC: 21 MMOL/L (ref 22–29)
CREAT SERPL-MCNC: 5.04 MG/DL (ref 0.76–1.27)
DEPRECATED RDW RBC AUTO: 63.4 FL (ref 37–54)
EGFRCR SERPLBLD CKD-EPI 2021: 11.1 ML/MIN/1.73
EOSINOPHIL # BLD AUTO: 0 10*3/MM3 (ref 0–0.4)
EOSINOPHIL NFR BLD AUTO: 0 % (ref 0.3–6.2)
ERYTHROCYTE [DISTWIDTH] IN BLOOD BY AUTOMATED COUNT: 19.2 % (ref 12.3–15.4)
GLUCOSE BLDC GLUCOMTR-MCNC: 211 MG/DL (ref 70–99)
GLUCOSE BLDC GLUCOMTR-MCNC: 223 MG/DL (ref 70–99)
GLUCOSE BLDC GLUCOMTR-MCNC: 236 MG/DL (ref 70–99)
GLUCOSE BLDC GLUCOMTR-MCNC: 247 MG/DL (ref 70–99)
GLUCOSE SERPL-MCNC: 235 MG/DL (ref 65–99)
HCT VFR BLD AUTO: 28.5 % (ref 37.5–51)
HGB BLD-MCNC: 8.6 G/DL (ref 13–17.7)
IMM GRANULOCYTES # BLD AUTO: 0.04 10*3/MM3 (ref 0–0.05)
IMM GRANULOCYTES NFR BLD AUTO: 0.6 % (ref 0–0.5)
LYMPHOCYTES # BLD AUTO: 0.56 10*3/MM3 (ref 0.7–3.1)
LYMPHOCYTES NFR BLD AUTO: 8.4 % (ref 19.6–45.3)
MAGNESIUM SERPL-MCNC: 2.1 MG/DL (ref 1.6–2.4)
MCH RBC QN AUTO: 27 PG (ref 26.6–33)
MCHC RBC AUTO-ENTMCNC: 30.2 G/DL (ref 31.5–35.7)
MCV RBC AUTO: 89.6 FL (ref 79–97)
MONOCYTES # BLD AUTO: 0.47 10*3/MM3 (ref 0.1–0.9)
MONOCYTES NFR BLD AUTO: 7 % (ref 5–12)
NEUTROPHILS NFR BLD AUTO: 5.6 10*3/MM3 (ref 1.7–7)
NEUTROPHILS NFR BLD AUTO: 83.9 % (ref 42.7–76)
NRBC BLD AUTO-RTO: 0 /100 WBC (ref 0–0.2)
PHOSPHATE SERPL-MCNC: 2 MG/DL (ref 2.5–4.5)
PLATELET # BLD AUTO: 153 10*3/MM3 (ref 140–450)
PMV BLD AUTO: 10.7 FL (ref 6–12)
POTASSIUM SERPL-SCNC: 3.3 MMOL/L (ref 3.5–5.2)
RBC # BLD AUTO: 3.18 10*6/MM3 (ref 4.14–5.8)
SODIUM SERPL-SCNC: 129 MMOL/L (ref 136–145)
WBC NRBC COR # BLD AUTO: 6.68 10*3/MM3 (ref 3.4–10.8)

## 2025-01-20 PROCEDURE — 82948 REAGENT STRIP/BLOOD GLUCOSE: CPT | Performed by: INTERNAL MEDICINE

## 2025-01-20 PROCEDURE — 99233 SBSQ HOSP IP/OBS HIGH 50: CPT | Performed by: INTERNAL MEDICINE

## 2025-01-20 PROCEDURE — 94761 N-INVAS EAR/PLS OXIMETRY MLT: CPT

## 2025-01-20 PROCEDURE — 94668 MNPJ CHEST WALL SBSQ: CPT

## 2025-01-20 PROCEDURE — 25010000002 HYDROCORTISONE SOD SUC (PF) 100 MG RECONSTITUTED SOLUTION: Performed by: INTERNAL MEDICINE

## 2025-01-20 PROCEDURE — 82948 REAGENT STRIP/BLOOD GLUCOSE: CPT

## 2025-01-20 PROCEDURE — 84100 ASSAY OF PHOSPHORUS: CPT | Performed by: INTERNAL MEDICINE

## 2025-01-20 PROCEDURE — 94664 DEMO&/EVAL PT USE INHALER: CPT

## 2025-01-20 PROCEDURE — 94799 UNLISTED PULMONARY SVC/PX: CPT

## 2025-01-20 PROCEDURE — 97110 THERAPEUTIC EXERCISES: CPT

## 2025-01-20 PROCEDURE — 63710000001 INSULIN LISPRO (HUMAN) PER 5 UNITS: Performed by: INTERNAL MEDICINE

## 2025-01-20 PROCEDURE — 99232 SBSQ HOSP IP/OBS MODERATE 35: CPT | Performed by: INTERNAL MEDICINE

## 2025-01-20 PROCEDURE — 82948 REAGENT STRIP/BLOOD GLUCOSE: CPT | Performed by: PHYSICIAN ASSISTANT

## 2025-01-20 PROCEDURE — 83735 ASSAY OF MAGNESIUM: CPT | Performed by: INTERNAL MEDICINE

## 2025-01-20 PROCEDURE — 85025 COMPLETE CBC W/AUTO DIFF WBC: CPT | Performed by: INTERNAL MEDICINE

## 2025-01-20 PROCEDURE — 97530 THERAPEUTIC ACTIVITIES: CPT

## 2025-01-20 PROCEDURE — 80048 BASIC METABOLIC PNL TOTAL CA: CPT | Performed by: INTERNAL MEDICINE

## 2025-01-20 RX ORDER — LABETALOL HYDROCHLORIDE 5 MG/ML
5 INJECTION, SOLUTION INTRAVENOUS EVERY 4 HOURS PRN
Status: DISCONTINUED | OUTPATIENT
Start: 2025-01-20 | End: 2025-01-25 | Stop reason: HOSPADM

## 2025-01-20 RX ORDER — INSULIN LISPRO 100 [IU]/ML
2-7 INJECTION, SOLUTION INTRAVENOUS; SUBCUTANEOUS
Status: DISCONTINUED | OUTPATIENT
Start: 2025-01-20 | End: 2025-01-21

## 2025-01-20 RX ORDER — HYDROCORTISONE SODIUM SUCCINATE 100 MG/2ML
25 INJECTION INTRAMUSCULAR; INTRAVENOUS EVERY 12 HOURS
Status: DISCONTINUED | OUTPATIENT
Start: 2025-01-20 | End: 2025-01-21

## 2025-01-20 RX ADMIN — POTASSIUM & SODIUM PHOSPHATES POWDER PACK 280-160-250 MG 1 PACKET: 280-160-250 PACK at 17:11

## 2025-01-20 RX ADMIN — OFLOXACIN 2 DROP: 3 SOLUTION OPHTHALMIC at 20:54

## 2025-01-20 RX ADMIN — HYDROCORTISONE SODIUM SUCCINATE 50 MG: 100 INJECTION, POWDER, FOR SOLUTION INTRAMUSCULAR; INTRAVENOUS at 00:19

## 2025-01-20 RX ADMIN — ARFORMOTEROL TARTRATE 15 MCG: 15 SOLUTION RESPIRATORY (INHALATION) at 19:54

## 2025-01-20 RX ADMIN — ARFORMOTEROL TARTRATE 15 MCG: 15 SOLUTION RESPIRATORY (INHALATION) at 06:46

## 2025-01-20 RX ADMIN — GUAIFENESIN 200 MG: 100 LIQUID ORAL at 20:51

## 2025-01-20 RX ADMIN — OFLOXACIN 2 DROP: 3 SOLUTION OPHTHALMIC at 12:25

## 2025-01-20 RX ADMIN — WHITE PETROLATUM 57.7 %-MINERAL OIL 31.9 % EYE OINTMENT: at 09:23

## 2025-01-20 RX ADMIN — OFLOXACIN 2 DROP: 3 SOLUTION OPHTHALMIC at 09:23

## 2025-01-20 RX ADMIN — INSULIN LISPRO 2 UNITS: 100 INJECTION, SOLUTION INTRAVENOUS; SUBCUTANEOUS at 12:25

## 2025-01-20 RX ADMIN — Medication 10 ML: at 20:51

## 2025-01-20 RX ADMIN — Medication 10 ML: at 09:22

## 2025-01-20 RX ADMIN — INSULIN LISPRO 3 UNITS: 100 INJECTION, SOLUTION INTRAVENOUS; SUBCUTANEOUS at 17:18

## 2025-01-20 RX ADMIN — ACETAMINOPHEN 650 MG: 160 SOLUTION ORAL at 09:21

## 2025-01-20 RX ADMIN — WHITE PETROLATUM 57.7 %-MINERAL OIL 31.9 % EYE OINTMENT: at 20:54

## 2025-01-20 RX ADMIN — Medication 10 ML: at 20:53

## 2025-01-20 RX ADMIN — WHITE PETROLATUM 57.7 %-MINERAL OIL 31.9 % EYE OINTMENT: at 17:11

## 2025-01-20 RX ADMIN — LEVOTHYROXINE SODIUM 175 MCG: 75 TABLET ORAL at 06:05

## 2025-01-20 RX ADMIN — ACETAMINOPHEN 650 MG: 160 SOLUTION ORAL at 14:59

## 2025-01-20 RX ADMIN — QUETIAPINE FUMARATE 12.5 MG: 25 TABLET ORAL at 20:51

## 2025-01-20 RX ADMIN — OFLOXACIN 2 DROP: 3 SOLUTION OPHTHALMIC at 17:12

## 2025-01-20 RX ADMIN — BUDESONIDE 0.5 MG: 0.5 INHALANT ORAL at 19:54

## 2025-01-20 RX ADMIN — BUDESONIDE 0.5 MG: 0.5 INHALANT ORAL at 06:46

## 2025-01-20 RX ADMIN — HYDROCORTISONE SODIUM SUCCINATE 50 MG: 100 INJECTION, POWDER, FOR SOLUTION INTRAMUSCULAR; INTRAVENOUS at 09:22

## 2025-01-20 RX ADMIN — HYDROCORTISONE SODIUM SUCCINATE 25 MG: 100 INJECTION, POWDER, FOR SOLUTION INTRAMUSCULAR; INTRAVENOUS at 20:52

## 2025-01-20 RX ADMIN — WHITE PETROLATUM 57.7 %-MINERAL OIL 31.9 % EYE OINTMENT: at 12:25

## 2025-01-20 RX ADMIN — SENNOSIDES AND DOCUSATE SODIUM 2 TABLET: 50; 8.6 TABLET ORAL at 09:21

## 2025-01-20 RX ADMIN — IPRATROPIUM BROMIDE AND ALBUTEROL SULFATE 3 ML: 2.5; .5 SOLUTION RESPIRATORY (INHALATION) at 06:46

## 2025-01-20 RX ADMIN — ACETAMINOPHEN 650 MG: 160 SOLUTION ORAL at 20:51

## 2025-01-20 RX ADMIN — IPRATROPIUM BROMIDE AND ALBUTEROL SULFATE 3 ML: 2.5; .5 SOLUTION RESPIRATORY (INHALATION) at 19:54

## 2025-01-20 RX ADMIN — PANTOPRAZOLE SODIUM 40 MG: 40 INJECTION, POWDER, FOR SOLUTION INTRAVENOUS at 06:05

## 2025-01-20 RX ADMIN — ATORVASTATIN CALCIUM 40 MG: 40 TABLET, FILM COATED ORAL at 09:21

## 2025-01-20 RX ADMIN — INSULIN LISPRO 3 UNITS: 100 INJECTION, SOLUTION INTRAVENOUS; SUBCUTANEOUS at 06:36

## 2025-01-20 RX ADMIN — ACETAMINOPHEN 650 MG: 160 SOLUTION ORAL at 02:23

## 2025-01-20 NOTE — PROGRESS NOTES
[facility]   Hospitalist Progress Note    Date of admission: 1/11/2025  Patient Name: Nelson Wang  1945  Date: 1/20/2025      Subjective     No chief complaint on file.      Interval Followup: More confused today, not able to tolerate p.o. safely, pulling at NG tube and lines at times, requiring sitter.  Discussed with family at bedside  Pending hemodialysis still this afternoon    Objective     Vitals:   Temp:  [98.1 °F (36.7 °C)-99 °F (37.2 °C)] 98.1 °F (36.7 °C)  Heart Rate:  [86-93] 93  Resp:  [18-23] 18  BP: (138-169)/(62-76) 166/73  Flow (L/min) (Oxygen Therapy):  [2] 2    Physical Exam  Ill-appearing in bed thin frail more confused today, can answer a few questions but distracted and delirious otherwise   NG in place  Mild rhonchi no wheezing on room air symmetric chest rise no acute respiratory distress  Rrr No lower extremity pitting edema  Abd nondistended nontender    Result Review:  Vital signs, labs and recent relevant imaging reviewed.      CBC          1/18/2025    04:49 1/19/2025    04:50 1/20/2025    06:42   CBC   WBC 4.97  6.50  6.68    RBC 3.68  3.23  3.18    Hemoglobin 9.9  8.7  8.6    Hematocrit 32.1  28.6  28.5    MCV 87.2  88.5  89.6    MCH 26.9  26.9  27.0    MCHC 30.8  30.4  30.2    RDW 18.8  19.0  19.2    Platelets 133  145  153      CMP          1/18/2025    04:49 1/19/2025    04:50 1/20/2025    06:42   CMP   Glucose 210  202  235    BUN 18  35  59    Creatinine 2.89  4.02  5.04    EGFR 21.6  14.5  11.1    Sodium 129  131  129    Potassium 4.0  3.4  3.3    Chloride 93  93  93    Calcium 8.9  9.0  9.1    Albumin 2.6      BUN/Creatinine Ratio 6.2  8.7  11.7    Anion Gap 10.3  12.6  15.0          albumin human    senna-docusate sodium **AND** polyethylene glycol **AND** [DISCONTINUED] bisacodyl **AND** bisacodyl    calcium carbonate    dextrose    dextrose    Diclofenac Sodium    glucagon (human recombinant)    guaifenesin    ipratropium-albuterol    Lidocaine    midodrine     ondansetron ODT    polyethyl glycol-propyl glycol    senna-docusate sodium **AND** [DISCONTINUED] polyethylene glycol **AND** [DISCONTINUED] bisacodyl **AND** [DISCONTINUED] bisacodyl    sodium chloride    sodium chloride    sodium chloride    sodium chloride    acetaminophen, 650 mg, Nasogastric, Q6H  arformoterol, 15 mcg, Nebulization, BID - RT  artificial tears, , Both Eyes, 4x Daily  atorvastatin, 40 mg, Nasogastric, Daily  budesonide, 0.5 mg, Nebulization, BID - RT  hydrocortisone sodium succinate, 25 mg, Intravenous, Q12H  insulin lispro, 2-7 Units, Subcutaneous, TID AC  ipratropium-albuterol, 3 mL, Nebulization, BID - RT  levothyroxine, 175 mcg, Nasogastric, Q AM  ofloxacin, 2 drop, Left Eye, 4x Daily  pantoprazole, 40 mg, Intravenous, Q AM  QUEtiapine, 12.5 mg, Nasogastric, Nightly  senna-docusate sodium, 2 tablet, Nasogastric, BID  [Held by provider] sevelamer, 800 mg, Oral, TID With Meals  sodium chloride, 10 mL, Intravenous, Q12H  sodium chloride, 10 mL, Intravenous, Q12H        XR Chest 1 View    Result Date: 1/17/2025  The study is ABNORMAL. The endotracheal (ET) tube tip is in the expected right mainstem bronchus. Consider retracting the endotracheal (ET) tube approximately 2 to 3 cm in order to optimize its positioning relative to the mel. Please see above comments for further detail.   Portions of this note were completed with a voice recognition program.  Electronically Signed By-Stan Hogan MD On:1/17/2025 2:36 AM      EEG    Result Date: 1/13/2025  Underlying background suggest diffuse cerebral dysfunction of moderate degree, most commonly seen due to toxic/metabolic cause The triphasic sharp waves are abnormal but nonspecific.  These are most commonly seen due to significant toxic/metabolic disarray, most notably liver or kidney dysfunction.  They may also be seen as a side effect of certain medications such as cefepime.  Less likely, they can also be found on the ictal/interictal spectrum  This report is transcribed using the Dragon dictation system.      XR Chest 1 View    Result Date: 1/12/2025  Increased bilateral infiltrates are seen. The findings may represent infectious multifocal pneumonia. Aspiration pneumonia cannot be excluded. Pulmonary edema is possible. A combination of these differential considerations may exist. Please see above comments for further detail.    Portions of this note were completed with a voice recognition program.  Electronically Signed By-Stan Hogan MD On:1/12/2025 11:24 PM         EEG - IMPRESSION:   Underlying background suggest diffuse cerebral dysfunction of moderate degree, most commonly seen due to toxic/metabolic cause   The triphasic sharp waves are abnormal but nonspecific.  These are most commonly seen due to significant toxic/metabolic disarray, most notably liver or kidney dysfunction.  They may also be seen as a side effect of certain medications such as cefepime.  Less likely, they can also be found on the ictal/interictal spectrum    Assessment / Plan     Summary: 79 y.o. ESRD on dialysis, type 2 diabetes, dementia, discitis having completed vancomycin, A-fib, restrictive lung disease.  Patient recently discharged following long hospitalization, admitted from 12/5/2024 through 1/8/2025.  Patient discharged home with in-home dialysis.  Unfortunately today patient's dialysis machine not working.  Patient's primary nephrologist was contacted, unable to get patient into other outpatient dialysis facilities.  Therefore, decision was made to direct admit patient into the hospital for dialysis as he has not had dialysis for 3 days.  Hospitalist service contacted for admission orders, nephrology consulted for dialysis needs.      RRT called morning of 12th.  Patient was found to have fixed gaze to the right.  Patient only responding to painful stimuli minimally.  Stat CT obtained showing stable examination without acute intracranial process.  Teleneurology  consulted during stroke RRT.  Felt altered mental status and confusion most likely secondary to toxic or metabolic etiology.  During stroke RRT patient noted to be hypotensive, while fluid bolus running, daughter endorses improvement in physical exam.  Patient was transferred to a telemetry bed, blood pressures again started drifting down therefore felt safest to transfer patient to the ICU for pressors if needed.     Patient's CT scan of pelvis does reveal right periprosthetic proximal femur fracture.  Orthopedic surgery consulted and able to talk with patient and daughter at bedside.  At this time plan is for conservative measures, continue nonoperative treatment with toe-touch as able, continue to work with PT and OT and follow-up in clinic clos.  Had worsening respiratory status and hypercapnia and hypotension and was intubated overnight on 1/16.  Random cortisol was notably low and was started on stress dose steroids for adrenal insufficiency.  Stable to be extubated on 1/18, continues to have issues with confusion/delirium, requiring NG for tube feeds, guarded long-term prognosis.  Did have notable troponin elevation suspect in setting of hypotension was treated initially with heparin drip repeat limited echo was without acute abnormalities and otherwise treated conservatively.  Continue goals of care discussion    Assessment/Plan (clinically significant if listed here)  Acute hypoxemic hypercapnic respiratory failure requiring mechanical ventilation  Encephalopathy suspect multifactorial with dementia, toxic/metabolic encephalopathy and possible hypoactive delirium  End-stage renal disease on dialysis  NSTEMI suspect type II from demand ischemia  Hypotension  Adrenal insufficiency significantly low random cortisol level  Possible pneumonia  History of diabetes, now diet controlled and A1c 5.6 most recently  Hydrocortisone related hyperglycemia not unexpected  Acute to subacute nondisplaced periprosthetic  fracture involving proximal right femur, managing nonoperatively  Dementia  Discitis, completed course of vancomycin  Atrial fibrillation not on anticoagulation due to falls  Restrictive lung disease      On room air currently during the day with borderline hypoxia will need 2 L at night but will try to keep off during the day to monitor his agitation unless truly hypoxemic  Cont HD was apparently pending for today but staff shortage wise now being rescheduled to tomorrow.  Getting potassium today via NG.   Previously was requiring midodrine on hemodialysis days, blood pressure now running higher in setting of hydrocortisone and treatment of adrenal insufficiency, will give greater blood pressure tolerance for now follow-up blood pressure after hemodialysis may need additional treatment at that time but for now defer unless consistently greater than 180 systolic  Monitor hyponatremia/hypokalemia   continue speech therapy evaluation not safe for diet today still too altered unfortunately, continue to the did not close monitoring  Continue stress dose steroids for adrenal insufficiency, decreasing further today possibly down to once daily tomorrow and monitor, try to avoid higher dose as able to avoid worsening confusion/agitation  Continue sitter for now  Continue PPI while on steroids/GI prophylaxis  Increase SSI frequency, only needing treatment for glucose while on steroids,   Continue with scheduled Tylenol via NG tube to try and help with pain - does seem to be doing a bit better with this   Try to minimize any sedating medications as able.    Nonoperative management of fracture per Ortho, monitor periodically with imaging outpatient, appreciate assistance - wbat, toe-touch as tolerated   Cont seroquel at night   Cont synthroid  Cont statin  Cont remote tele for now as tolerated  Dnr/dni. Cont goc discussions and supportive care/palliative care discussions, guarded long-term prognosis unfortunately. Would be  appropriate for hospice/comfort focused measures if desired by family.    F/u am bmp, mg, phos    Dispo: home with family/caregivers once medically stable.   D/w SW    VTE Prophylaxis:  Mechanical VTE prophylaxis orders are present.

## 2025-01-20 NOTE — PLAN OF CARE
Goal Outcome Evaluation:  Plan of Care Reviewed With: patient; family  Progress: improving  Outcome Evaluation: VSS. Patient alert to self. Patient tolerates tube feeds at goal rate. Dialysis scheduled for tomorrow. Family and safety sitter at bedside throughout shift. No other concerns or complaints. Continue plan of care.

## 2025-01-20 NOTE — PROGRESS NOTES
Nutrition Services    Patient Name: Nelson Wang  YOB: 1945  MRN: [id number]  Admission date: 1/11/2025    PROGRESS NOTE      Encounter Information: EN Follow Up       PO Diet: NPO Diet NPO Type: Tube Feeding   PO Supplements: NPO   PO Intake:  NPO       Current nutrition support: Nova Source Renal @ 45mLhr, flush 40mL q2h    Provides: 1980 kcal, 90g pro, 1183mL fluid (703 FW + 480 flush)       Estimated Needs: 3441-5529 kcal (25-30 kcal/kg), 77-96 g(1.2-1.5 g/kg), 5691-5833 mL (1 mL/kcal)- using est. Dry wt 64 kg       Nutrition support review: TF continues at goal, pt tolerating well; per RN.        Labs (reviewed below): Labs wnl for ESRD, DM        GI Function:  No GI concerns       Nutrition Intervention Updates: Continue Nova Source Renal @ 45mLhr, flush 40 mL q2h   EN support per family wishes, POC/GOC.     Results from last 7 days   Lab Units 01/19/25  0450 01/18/25  0449 01/17/25  0349 01/17/25  0231 01/15/25  0433 01/14/25  0318   SODIUM mmol/L 131* 129* 131* 133* 132* 134*   POTASSIUM mmol/L 3.4* 4.0 4.0 4.0 4.1 4.2   CHLORIDE mmol/L 93* 93* 96* 97* 96* 99   CO2 mmol/L 25.4 25.7 22.1 26.0 23.8 24.7   BUN mg/dL 35* 18 17 15 21 14   CREATININE mg/dL 4.02* 2.89* 3.91* 4.00* 4.57* 3.01*   CALCIUM mg/dL 9.0 8.9 9.4 9.6 9.7 9.2   BILIRUBIN mg/dL  --   --   --  0.6 0.6 0.6   ALK PHOS U/L  --   --   --  118* 114 114   ALT (SGPT) U/L  --   --   --  9 10 11   AST (SGOT) U/L  --   --   --  29 35 41*   GLUCOSE mg/dL 202* 210* 98 93 119* 153*     Results from last 7 days   Lab Units 01/20/25  0642 01/19/25  0450 01/18/25  0449 01/17/25  0349 01/17/25  0231   MAGNESIUM mg/dL  --  2.0 1.9  --  2.0   PHOSPHORUS mg/dL  --  2.5 2.2*   < > 5.2*   HEMOGLOBIN g/dL 8.6* 8.7* 9.9*   < > 9.4*   HEMATOCRIT % 28.5* 28.6* 32.1*   < > 32.5*    < > = values in this interval not displayed.     COVID19   Date Value Ref Range Status   01/13/2025 Not Detected Not Detected - Ref. Range Final     Lab Results   Component  Value Date    HGBA1C 5.60 01/13/2025       RD to follow up per protocol.    Electronically signed by:  Bárbara Gagnon RD  01/20/25 07:05 EST

## 2025-01-20 NOTE — PROGRESS NOTES
[facility] Progress Note      Patient Name: Nelson Wang  : 1945  MRN: [id number]  Primary Care Physician:  Domingo Bravo MD  Date of admission: 2025    Subjective     Interval History:   F/u ESRD     Review of Systems:   Much more alert compared to Friday.  Incoherent, mildly restless, reportedly yesterday was talking to family.  Hemodynamically stable.  Getting TFs     Objective     Vitals:   Temp:  [98.1 °F (36.7 °C)-99 °F (37.2 °C)] 98.1 °F (36.7 °C)  Heart Rate:  [86-93] 93  Resp:  [18-23] 18  BP: (138-169)/(62-76) 166/73  Flow (L/min) (Oxygen Therapy):  [2] 2    Intake/Output Summary (Last 24 hours) at 2025 1618  Last data filed at 2025 0602  Gross per 24 hour   Intake 1602.5 ml   Output --   Net 1602.5 ml       Physical Exam:    General Appearance: frail but more alert, DHT in place   Neck: supple, no JVD  Lungs: Decreased efforts, air entry noted bilaterally, upper airway rhonchi  Heart: RRR, normal S1 and S2  Abdomen: soft, nontender, nondistended  Extremities: no edema, cyanosis or clubbing  Patent left upper extremity AV fistula  Moving extremities      Scheduled Meds:     acetaminophen, 650 mg, Nasogastric, Q6H  arformoterol, 15 mcg, Nebulization, BID - RT  artificial tears, , Both Eyes, 4x Daily  atorvastatin, 40 mg, Nasogastric, Daily  budesonide, 0.5 mg, Nebulization, BID - RT  hydrocortisone sodium succinate, 25 mg, Intravenous, Q12H  insulin lispro, 2-7 Units, Subcutaneous, TID AC  ipratropium-albuterol, 3 mL, Nebulization, BID - RT  levothyroxine, 175 mcg, Nasogastric, Q AM  ofloxacin, 2 drop, Left Eye, 4x Daily  pantoprazole, 40 mg, Intravenous, Q AM  potassium & sodium phosphates, 1 packet, Nasogastric, Once  QUEtiapine, 12.5 mg, Nasogastric, Nightly  senna-docusate sodium, 2 tablet, Nasogastric, BID  [Held by provider] sevelamer, 800 mg, Oral, TID With Meals  sodium chloride, 10 mL, Intravenous, Q12H  sodium chloride, 10 mL, Intravenous,  Q12H      IV Meds:        Results Reviewed:   I have personally reviewed the results from the time of this admission to 1/20/2025 16:18 EST     Results from last 7 days   Lab Units 01/20/25  0642 01/19/25  0450 01/18/25  0449 01/17/25  0349 01/17/25  0231 01/15/25  0433 01/14/25  0318   SODIUM mmol/L 129* 131* 129*   < > 133* 132* 134*   POTASSIUM mmol/L 3.3* 3.4* 4.0   < > 4.0 4.1 4.2   CHLORIDE mmol/L 93* 93* 93*   < > 97* 96* 99   CO2 mmol/L 21.0* 25.4 25.7   < > 26.0 23.8 24.7   BUN mg/dL 59* 35* 18   < > 15 21 14   CREATININE mg/dL 5.04* 4.02* 2.89*   < > 4.00* 4.57* 3.01*   CALCIUM mg/dL 9.1 9.0 8.9   < > 9.6 9.7 9.2   BILIRUBIN mg/dL  --   --   --   --  0.6 0.6 0.6   ALK PHOS U/L  --   --   --   --  118* 114 114   ALT (SGPT) U/L  --   --   --   --  9 10 11   AST (SGOT) U/L  --   --   --   --  29 35 41*   GLUCOSE mg/dL 235* 202* 210*   < > 93 119* 153*    < > = values in this interval not displayed.     Estimated Creatinine Clearance: 11 mL/min (A) (by C-G formula based on SCr of 5.04 mg/dL (H)).  Results from last 7 days   Lab Units 01/20/25  0642 01/19/25 0450 01/18/25 0449   MAGNESIUM mg/dL 2.1 2.0 1.9   PHOSPHORUS mg/dL 2.0* 2.5 2.2*         Results from last 7 days   Lab Units 01/20/25  0642 01/19/25  0450 01/18/25  0449 01/17/25  0638 01/17/25  0231   WBC 10*3/mm3 6.68 6.50 4.97 4.86 5.25   HEMOGLOBIN g/dL 8.6* 8.7* 9.9* 9.4* 9.4*   PLATELETS 10*3/mm3 153 145 133* 141 159     Results from last 7 days   Lab Units 01/17/25  0638   INR  1.07       Assessment / Plan     ASSESSMENT:  - ESRD, was on home dialysis prior to admission, has left upper extremity AV fistula for access.   Dialysis m/w/f currently, last treatment FRI  - Mild hyponatremia sodium 129.  - Hypokalemia.  - Type 2 diabetes with complications.  - Hypotension - resolved, now BP is okay.    - Acute encephalopathy, mentation better compared to Friday.   -- Acute resp failure, extubated, on room air currently   -- R femur FX- ortho seeing  --  anemia of CKD, hgb down to 8.6  - Probable adrenal insufficiency, on hydrocortisone.  -- DNR status    PLAN:  HD tomorrow instead of today due to staff shortage., 4K bath, remove 2L as tolerated   If BP remains high despite ultrafiltration may need to start antihypertensive.  Potassium chloride 20 mill equivalent powder down DHT today x 1.  Discussed with family.  Waiting on outpatient dialysis placement until able to resume home hemodialysis.  Prognosis remains poor though looking up.    Coreen Castro MD  01/20/25  16:18 [facility]    [facility]  [phone]

## 2025-01-20 NOTE — SIGNIFICANT NOTE
01/20/25 0793   OTHER   Discipline speech language pathologist   Rehab Time/Intention   Session Not Performed patient unavailable for evaluation  (decreased attention to task, staring, not following commands)   Recommendations   SLP - Next Appointment 01/21/25

## 2025-01-20 NOTE — THERAPY TREATMENT NOTE
Acute Care - Physical Therapy Treatment Note  BRIA Sullivan     Patient Name: Nelson Wang  : 1945  MRN: [id number]  Today's Date: 2025   Onset of Illness/Injury or Date of Surgery: 25  Visit Dx:     ICD-10-CM ICD-9-CM   1. Chronic respiratory failure with hypoxia  J96.11 518.83     799.02   2. Recurrent pneumonia  J18.9 486   3. Acute on chronic respiratory failure with hypoxia  J96.21 518.84     799.02   4. ESRD (end stage renal disease)  N18.6 585.6   5. Aspiration pneumonia, unspecified aspiration pneumonia type, unspecified laterality, unspecified part of lung  J69.0 507.0   6. Difficulty in walking  R26.2 719.7     Patient Active Problem List   Diagnosis    Essential hypertension    Bradycardia, sinus    Anemia due to chronic kidney disease    Hypothyroidism    Idiopathic gout    Proteinuria    Psoriasis    Thrombocytopenia    Type 2 diabetes mellitus without complication    CKD (chronic kidney disease), stage IV    Hyperlipidemia    Monoclonal gammopathy of unknown significance (MGUS)    Stage 5 chronic kidney disease    Chronic gout without tophus    Peritoneal dialysis catheter dysfunction    Medicare annual wellness visit, subsequent    Chronic cough    Peritonitis associated with peritoneal dialysis    Recurrent pneumonia    Acute on chronic respiratory failure with hypoxia    ESRD (end stage renal disease)    Aspiration pneumonitis    Sepsis    Type 2 diabetes mellitus, with long-term current use of insulin    GERD without esophagitis    Immunosuppression due to drug therapy    Bipolar disorder    Chronic respiratory failure with hypoxia    Shortness of breath    Abnormal stress test    ESRD on dialysis    Abnormal chest CT    Encounter for screening for malignant neoplasm of colon    History of colon polyps    Family history of colon cancer    Intractable pain    Abdominal pain    ESRD (end stage renal disease) on dialysis    AMS (altered mental status)    Hallucinations    LUQ pain     Discitis    Metabolic encephalopathy    Aspiration pneumonia    Discitis of lumbar region    Severe malnutrition    Dementia    Unspecified severe protein-calorie malnutrition    Hypoxia    Gram-positive bacteremia    Moderate protein-calorie malnutrition     Past Medical History:   Diagnosis Date    Abnormal stress test     Anemia     IRON INFUSIONS WITH DIALYSIS    Anesthesia     WITH HIP REPLACEMENT DAUGHTER BELIEVES HAD SVT 2000    Ankle pain, right     Anxiety and depression     Arthritis     CKD (chronic kidney disease), stage IV     DIALYSIS [id number]-[id number] SHILEY IN RIGHT CHEST    Diabetes     GERD (gastroesophageal reflux disease)     Gout     High cholesterol     History of peritoneal dialysis     HL (hearing loss)     Hyperkalemia     Hypertension     Hypothyroidism     Insomnia     Night terrors     Psoriasis     Psoriasis     Sleep apnea     Sleep walking     Thrombocytopenia     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    Vitiligo      Past Surgical History:   Procedure Laterality Date    ABDOMINAL SURGERY      ANKLE SURGERY Right 11/1990    APPENDECTOMY N/A 1954    ARTERIOVENOUS FISTULA/SHUNT SURGERY Left 07/16/2024    Procedure: Creation of left arm arteriovenous fistula;  Surgeon: Moses Mir MD;  Location: [facility] MAIN OR;  Service: Vascular;  Laterality: Left;    BRONCHOSCOPY N/A 03/01/2024    Procedure: BRONCHOSCOPY WITH BAL AND WASHINGS;  Surgeon: Sandra Espinal MD;  Location: [facility] ENDOSCOPY;  Service: Pulmonary;  Laterality: N/A;  PNEUMONIA    BRONCHOSCOPY N/A 08/30/2024    Procedure: BRONCHOSCOPY WITH BALS AND WASHINGS;  Surgeon: Sandra Espinal MD;  Location: [facility] ENDOSCOPY;  Service: Pulmonary;  Laterality: N/A;  MUCOUS PLUGGING    CARDIAC CATHETERIZATION N/A 05/29/2024    Procedure: Left Heart Cath with possible coronary angioplasty;  Surgeon: Stephan Nichols MD;  Location: [facility] CATH INVASIVE LOCATION;  Service: Cardiology;  Laterality: N/A;    COLONOSCOPY N/A 06/2022     [facility]    ENDOSCOPY N/A 10/28/2024    Procedure: ESOPHAGOGASTRODUODENOSCOPY INSERTION OF LIGHTED INSTRUMENT TO VIEW ESOPHAGUS, STOMACH AND SMALL INTESTINE;  Surgeon: Kingsley Peraza MD;  Location: [facility] ENDOSCOPY;  Service: Gastroenterology;  Laterality: N/A;  Gastritis    FRACTURE SURGERY      HIP BIPOLAR REPLACEMENT Right 01/2000    INSERTION HEMODIALYSIS CATHETER Left 04/09/2024    Procedure: HEMODIALYSIS CATHETER INSERTION;  Surgeon: Jose Berry MD;  Location: [facility] OR;  Service: General;  Laterality: Left;    INSERTION HEMODIALYSIS CATHETER N/A 04/12/2024    Procedure: Tunneled hemodialysis catheter insertion;  Surgeon: Enrique Vinson MD;  Location: [facility] OR;  Service: Vascular;  Laterality: N/A;    INSERTION PERITONEAL DIALYSIS CATHETER N/A 03/27/2023    Procedure: LAPAROSCOPIC INSERTION PERITONEAL DIALYSIS CATHETER, LAPAROSCOPIC OMENTOPEXY WITH LYSIS OF ADHESIONS;  Surgeon: Jose Berry MD;  Location: [facility] OR;  Service: General;  Laterality: N/A;    INSERTION PERITONEAL DIALYSIS CATHETER Left 07/23/2023    Procedure: REVISION OF PERITONEAL DIALYSIS CATHETER;  Surgeon: Radha Oreilly MD;  Location: [facility] OR;  Service: General;  Laterality: Left;    JOINT REPLACEMENT      REMOVAL PERITONEAL DIALYSIS CATHETER N/A 04/09/2024    Procedure: REMOVAL PERITONEAL DIALYSIS CATHETER;  Surgeon: Jose Berry MD;  Location: [facility] OR;  Service: General;  Laterality: N/A;    RENAL BIOPSY Left 07/15/2022    UPPER GASTROINTESTINAL ENDOSCOPY      VASCULAR SURGERY      WRIST SURGERY      UNSURE WHICH SIDE DAUGHTER REPORTS HAD SEVERE  CUT FROM WINDOW AND THEY HAD TO DO RECONSTRUCTIVE SURGERY WITH VESSELS AND NERVES     PT Assessment (Last 12 Hours)       PT Evaluation and Treatment       Row Name 01/20/25 1159          Physical Therapy Time and Intention    Subjective Information no complaints  -DK     Document Type therapy note (daily note)   -DK     Mode of Treatment individual therapy;physical therapy  -DK     Patient Effort good  -DK     Symptoms Noted During/After Treatment none  -DK     Comment Family and sitter in the room.  -       Row Name 01/20/25 1159          Pain    Pretreatment Pain Rating 0/10 - no pain  -DK     Posttreatment Pain Rating 0/10 - no pain  -DK       Row Name 01/20/25 1159          Cognition    Affect/Mental Status (Cognition) confused;anxious  mildy agitated at times  -DK     Behavioral Issues (Cognition) overwhelmed easily;difficulty managing stress  -DK     Orientation Status (Cognition) oriented to;person  -DK     Follows Commands (Cognition) physical/tactile prompts required;repetition of directions required;verbal cues/prompting required  -DK     Cognitive Function (Cognition) attention deficit  -DK     Attention Deficit (Cognition) minimal deficit;moderate deficit;concentration;focused/sustained attention  -DK     Personal Safety Interventions nonskid shoes/slippers when out of bed;supervised activity;gait belt  -DK       Row Name 01/20/25 1159          Mobility    Extremity Weight-bearing Status right lower extremity  -DK     Right Lower Extremity (Weight-bearing Status) toe touch weight-bearing (TTWB)  -       Row Name 01/20/25 1159          Bed Mobility    Bed Mobility supine-sit-supine;scooting/bridging  -DK     All Activities, Mobile (Bed Mobility) 1 person assist;2 person assist;maximum assist (25% patient effort)  -DK     Scooting/Bridging Mobile (Bed Mobility) maximum assist (25% patient effort);2 person assist  -DK     Supine-Sit-Supine Mobile (Bed Mobility) maximum assist (25% patient effort);1 person assist  -DK     Bed Mobility, Safety Issues cognitive deficits limit understanding;decreased use of arms for pushing/pulling;decreased use of legs for bridging/pushing;impaired trunk control for bed mobility  -DK     Assistive Device (Bed Mobility) repositioning sheet  -DK     Comment, (Bed  Mobility) Pt sat EOB x 1-2 minutes with min/mod assist x 1.  He kept pulling backwards while in sitting.  Pt was returned to bed post treatment.  -       Row Name 01/20/25 1159          Safety Issues/Impairments Affecting Functional Mobility    Safety Issues Affecting Function (Mobility) ability to follow commands;awareness of need for assistance;impulsivity;judgment;safety precaution awareness  -     Impairments Affecting Function (Mobility) balance;cognition;endurance/activity tolerance;strength  -     Cognitive Impairments, Mobility Safety/Performance attention;awareness, need for assistance;impulsivity;judgment;safety precaution awareness  -       Row Name 01/20/25 1159          Balance    Balance Assessment sitting static balance;sitting dynamic balance  -     Static Sitting Balance minimal assist;1-person assist  -     Dynamic Sitting Balance moderate assist;1-person assist  -     Position, Sitting Balance supported;sitting edge of bed  -     Balance Interventions sitting;supported;static;dynamic  -       Row Name 01/20/25 1159          Motor Skills    Motor Skills --  therapeutic exercises  -     Therapeutic Exercise hip;knee;ankle  -       Row Name 01/20/25 1159          Hip (Therapeutic Exercise)    Hip (Therapeutic Exercise) AAROM (active assistive range of motion)  -     Hip AAROM (Therapeutic Exercise) bilateral;flexion;extension;aBduction;aDduction;supine;10 repetitions;2 sets  -       Row Name 01/20/25 1159          Knee (Therapeutic Exercise)    Knee (Therapeutic Exercise) AAROM (active assistive range of motion)  -     Knee AAROM (Therapeutic Exercise) bilateral;flexion;extension;supine;10 repetitions;2 sets  -       Row Name 01/20/25 1159          Ankle (Therapeutic Exercise)    Ankle (Therapeutic Exercise) AAROM (active assistive range of motion)  -     Ankle AAROM (Therapeutic Exercise) bilateral;dorsiflexion;plantarflexion;supine;10 repetitions;2 sets  -        Row Name 01/20/25 1159          Plan of Care Review    Plan of Care Reviewed With patient;family  -DK     Progress improving  -DK       Row Name 01/20/25 1159          Positioning and Restraints    Pre-Treatment Position in bed  -DK     Post Treatment Position bed  -DK     In Bed side lying left;call light within reach;encouraged to call for assist;exit alarm on;patient within staff view;with family/caregiver;side rails up x3;legs elevated  -DK               User Key  (r) = Recorded By, (t) = Taken By, (c) = Cosigned By      Initials Name Provider Type    Lia Canales PTA Physical Therapist Assistant                    Physical Therapy Education       Title: PT OT SLP Therapies (Done)       Topic: Physical Therapy (Done)       Point: Mobility training (Done)       Learning Progress Summary            Patient Acceptance, E,TB, VU by  at 1/19/2025 1316                      Point: Home exercise program (Done)       Learning Progress Summary            Patient Acceptance, E,TB, VU by  at 1/19/2025 1316                      Point: Body mechanics (Done)       Learning Progress Summary            Patient Acceptance, E,TB, VU by  at 1/19/2025 1316                      Point: Precautions (Done)       Learning Progress Summary            Patient Acceptance, E,TB, VU by  at 1/19/2025 1316                                      User Key       Initials Effective Dates Name Provider Type Discipline    VINH 04/25/21 -  Junior Ambrocio, PT Physical Therapist PT                  PT Recommendation and Plan     Plan of Care Reviewed With: patient, family  Progress: improving   Outcome Measures       Row Name 01/20/25 1159 01/19/25 1316          How much help from another person do you currently need...    Turning from your back to your side while in flat bed without using bedrails? 2  -DK 2  -DW     Moving from lying on back to sitting on the side of a flat bed without bedrails? 2  -DK 1  -DW     Moving to and from a bed to  a chair (including a wheelchair)? 1  -DK 1  -DW     Standing up from a chair using your arms (e.g., wheelchair, bedside chair)? 1  -DK 1  -DW     Climbing 3-5 steps with a railing? 1  -DK 1  -DW     To walk in hospital room? 1  -DK 1  -DW     AM-PAC 6 Clicks Score (PT) 8  -DK 7  -DW        Functional Assessment    Outcome Measure Options AM-PAC 6 Clicks Basic Mobility (PT)  -DK AM-PAC 6 Clicks Basic Mobility (PT)  -DW               User Key  (r) = Recorded By, (t) = Taken By, (c) = Cosigned By      Initials Name Provider Type    Lia Canales PTA Physical Therapist Assistant    Junior Amaral PT Physical Therapist                     Time Calculation:    PT Charges       Row Name 01/20/25 1205             Time Calculation    PT Received On 01/20/25  -DK      PT Goal Re-Cert Due Date 01/28/25  -DK         Timed Charges    47279 - PT Therapeutic Exercise Minutes 14  -DK      44071 - PT Therapeutic Activity Minutes 11  -DK         Total Minutes    Timed Charges Total Minutes 25  -DK       Total Minutes 25  -DK                User Key  (r) = Recorded By, (t) = Taken By, (c) = Cosigned By      Initials Name Provider Type    Lia Canales PTA Physical Therapist Assistant                  Therapy Charges for Today       Code Description Service Date Service Provider Modifiers Qty    29739991816 HC PT THER PROC EA 15 MIN 1/20/2025 Lia Escamilla PTA GP 1    40643855689 HC PT THERAPEUTIC ACT EA 15 MIN 1/20/2025 Lia Escamilla PTA GP 1            PT G-Codes  Outcome Measure Options: AM-PAC 6 Clicks Basic Mobility (PT)  AM-PAC 6 Clicks Score (PT): 8    Lia Escamilla PTA  1/20/2025

## 2025-01-20 NOTE — PLAN OF CARE
Goal Outcome Evaluation:  Plan of Care Reviewed With: patient        Progress: improving  Outcome Evaluation: Vitals stable, titrated to RA. Pt with safety sitter d/t picking at IVs and NG. Pt did not voice any concerns. TF continues at goal, pt tolerating well. Will document any changes.

## 2025-01-21 LAB
ANION GAP SERPL CALCULATED.3IONS-SCNC: 15.5 MMOL/L (ref 5–15)
BUN SERPL-MCNC: 76 MG/DL (ref 8–23)
BUN/CREAT SERPL: 12.4 (ref 7–25)
CALCIUM SPEC-SCNC: 9.1 MG/DL (ref 8.6–10.5)
CHLORIDE SERPL-SCNC: 93 MMOL/L (ref 98–107)
CO2 SERPL-SCNC: 21.5 MMOL/L (ref 22–29)
CREAT SERPL-MCNC: 6.11 MG/DL (ref 0.76–1.27)
EGFRCR SERPLBLD CKD-EPI 2021: 8.8 ML/MIN/1.73
GLUCOSE BLDC GLUCOMTR-MCNC: 171 MG/DL (ref 70–99)
GLUCOSE BLDC GLUCOMTR-MCNC: 202 MG/DL (ref 70–99)
GLUCOSE BLDC GLUCOMTR-MCNC: 212 MG/DL (ref 70–99)
GLUCOSE SERPL-MCNC: 242 MG/DL (ref 65–99)
MAGNESIUM SERPL-MCNC: 2.2 MG/DL (ref 1.6–2.4)
PHOSPHATE SERPL-MCNC: 2 MG/DL (ref 2.5–4.5)
POTASSIUM SERPL-SCNC: 3.2 MMOL/L (ref 3.5–5.2)
SODIUM SERPL-SCNC: 130 MMOL/L (ref 136–145)
WHOLE BLOOD HOLD SPECIMEN: NORMAL

## 2025-01-21 PROCEDURE — 83735 ASSAY OF MAGNESIUM: CPT | Performed by: INTERNAL MEDICINE

## 2025-01-21 PROCEDURE — 82948 REAGENT STRIP/BLOOD GLUCOSE: CPT

## 2025-01-21 PROCEDURE — 99232 SBSQ HOSP IP/OBS MODERATE 35: CPT | Performed by: INTERNAL MEDICINE

## 2025-01-21 PROCEDURE — 94761 N-INVAS EAR/PLS OXIMETRY MLT: CPT

## 2025-01-21 PROCEDURE — 63710000001 INSULIN LISPRO (HUMAN) PER 5 UNITS: Performed by: INTERNAL MEDICINE

## 2025-01-21 PROCEDURE — 80048 BASIC METABOLIC PNL TOTAL CA: CPT | Performed by: INTERNAL MEDICINE

## 2025-01-21 PROCEDURE — 94664 DEMO&/EVAL PT USE INHALER: CPT

## 2025-01-21 PROCEDURE — 99233 SBSQ HOSP IP/OBS HIGH 50: CPT | Performed by: INTERNAL MEDICINE

## 2025-01-21 PROCEDURE — 94668 MNPJ CHEST WALL SBSQ: CPT

## 2025-01-21 PROCEDURE — 84100 ASSAY OF PHOSPHORUS: CPT | Performed by: INTERNAL MEDICINE

## 2025-01-21 PROCEDURE — 94799 UNLISTED PULMONARY SVC/PX: CPT

## 2025-01-21 PROCEDURE — 25010000002 HYDROCORTISONE SOD SUC (PF) 100 MG RECONSTITUTED SOLUTION: Performed by: INTERNAL MEDICINE

## 2025-01-21 PROCEDURE — 97165 OT EVAL LOW COMPLEX 30 MIN: CPT

## 2025-01-21 RX ORDER — QUETIAPINE FUMARATE 25 MG/1
25 TABLET, FILM COATED ORAL NIGHTLY
Status: DISCONTINUED | OUTPATIENT
Start: 2025-01-21 | End: 2025-01-22

## 2025-01-21 RX ORDER — INSULIN LISPRO 100 [IU]/ML
2-7 INJECTION, SOLUTION INTRAVENOUS; SUBCUTANEOUS EVERY 6 HOURS SCHEDULED
Status: DISCONTINUED | OUTPATIENT
Start: 2025-01-21 | End: 2025-01-25 | Stop reason: HOSPADM

## 2025-01-21 RX ORDER — IPRATROPIUM BROMIDE AND ALBUTEROL SULFATE 2.5; .5 MG/3ML; MG/3ML
3 SOLUTION RESPIRATORY (INHALATION) EVERY 6 HOURS PRN
Status: DISCONTINUED | OUTPATIENT
Start: 2025-01-21 | End: 2025-01-25 | Stop reason: HOSPADM

## 2025-01-21 RX ORDER — HYDROCORTISONE SODIUM SUCCINATE 100 MG/2ML
25 INJECTION INTRAMUSCULAR; INTRAVENOUS EVERY 24 HOURS
Status: DISCONTINUED | OUTPATIENT
Start: 2025-01-22 | End: 2025-01-22

## 2025-01-21 RX ADMIN — Medication 10 ML: at 13:22

## 2025-01-21 RX ADMIN — PANTOPRAZOLE SODIUM 40 MG: 40 INJECTION, POWDER, FOR SOLUTION INTRAVENOUS at 05:38

## 2025-01-21 RX ADMIN — Medication 10 ML: at 22:16

## 2025-01-21 RX ADMIN — BUDESONIDE 0.5 MG: 0.5 INHALANT ORAL at 07:10

## 2025-01-21 RX ADMIN — IPRATROPIUM BROMIDE AND ALBUTEROL SULFATE 3 ML: 2.5; .5 SOLUTION RESPIRATORY (INHALATION) at 07:09

## 2025-01-21 RX ADMIN — WHITE PETROLATUM 57.7 %-MINERAL OIL 31.9 % EYE OINTMENT: at 07:53

## 2025-01-21 RX ADMIN — WHITE PETROLATUM 57.7 %-MINERAL OIL 31.9 % EYE OINTMENT: at 13:23

## 2025-01-21 RX ADMIN — ACETAMINOPHEN 650 MG: 160 SOLUTION ORAL at 22:14

## 2025-01-21 RX ADMIN — OFLOXACIN 2 DROP: 3 SOLUTION OPHTHALMIC at 17:43

## 2025-01-21 RX ADMIN — LEVOTHYROXINE SODIUM 175 MCG: 75 TABLET ORAL at 05:39

## 2025-01-21 RX ADMIN — WHITE PETROLATUM 57.7 %-MINERAL OIL 31.9 % EYE OINTMENT: at 17:43

## 2025-01-21 RX ADMIN — BUDESONIDE 0.5 MG: 0.5 INHALANT ORAL at 19:11

## 2025-01-21 RX ADMIN — OFLOXACIN 2 DROP: 3 SOLUTION OPHTHALMIC at 22:17

## 2025-01-21 RX ADMIN — ARFORMOTEROL TARTRATE 15 MCG: 15 SOLUTION RESPIRATORY (INHALATION) at 07:09

## 2025-01-21 RX ADMIN — INSULIN LISPRO 3 UNITS: 100 INJECTION, SOLUTION INTRAVENOUS; SUBCUTANEOUS at 07:52

## 2025-01-21 RX ADMIN — GUAIFENESIN 200 MG: 100 LIQUID ORAL at 22:14

## 2025-01-21 RX ADMIN — GUAIFENESIN 200 MG: 100 LIQUID ORAL at 13:29

## 2025-01-21 RX ADMIN — Medication 10 ML: at 22:15

## 2025-01-21 RX ADMIN — INSULIN LISPRO 2 UNITS: 100 INJECTION, SOLUTION INTRAVENOUS; SUBCUTANEOUS at 13:29

## 2025-01-21 RX ADMIN — ACETAMINOPHEN 650 MG: 160 SOLUTION ORAL at 04:12

## 2025-01-21 RX ADMIN — ARFORMOTEROL TARTRATE 15 MCG: 15 SOLUTION RESPIRATORY (INHALATION) at 19:11

## 2025-01-21 RX ADMIN — WHITE PETROLATUM 57.7 %-MINERAL OIL 31.9 % EYE OINTMENT: at 22:17

## 2025-01-21 RX ADMIN — ATORVASTATIN CALCIUM 40 MG: 40 TABLET, FILM COATED ORAL at 13:23

## 2025-01-21 RX ADMIN — QUETIAPINE FUMARATE 25 MG: 25 TABLET ORAL at 22:14

## 2025-01-21 RX ADMIN — ACETAMINOPHEN 650 MG: 160 SOLUTION ORAL at 15:13

## 2025-01-21 RX ADMIN — HYDROCORTISONE SODIUM SUCCINATE 25 MG: 100 INJECTION, POWDER, FOR SOLUTION INTRAMUSCULAR; INTRAVENOUS at 13:23

## 2025-01-21 RX ADMIN — OFLOXACIN 2 DROP: 3 SOLUTION OPHTHALMIC at 07:53

## 2025-01-21 RX ADMIN — INSULIN LISPRO 3 UNITS: 100 INJECTION, SOLUTION INTRAVENOUS; SUBCUTANEOUS at 17:42

## 2025-01-21 RX ADMIN — OFLOXACIN 2 DROP: 3 SOLUTION OPHTHALMIC at 13:23

## 2025-01-21 NOTE — PLAN OF CARE
Goal Outcome Evaluation:  Plan of Care Reviewed With: patient        Progress: no change  Outcome Evaluation: Patient previously dependent with all self-care activities and limited functional mobility with frequent falls reported.  Patient receives full caregiver support at home.  No further occupational therapy services recommended at this time.  Should patient's condition improve, will reevaluate patient for further therapy needs.    Anticipated Discharge Disposition (OT): home with 24/7 care

## 2025-01-21 NOTE — PLAN OF CARE
Goal Outcome Evaluation:  Plan of Care Reviewed With: patient   Pt vss. Dialysis completed this shift, 2L removed. Tube feedings remain at goal rate 45mL/hr. Sliding scale insulin required as scheduled. Safety sitter at bedside. Continuing with plan of care.

## 2025-01-21 NOTE — PROGRESS NOTES
Psychiatric   Hospitalist Progress Note  Date: 2025  Patient Name: Nelson Wang  : 1945  MRN: [id number]  Date of admission: 2025      Subjective   Subjective     Chief Complaint: Follow up for     Summary:  79 y.o. ESRD on dialysis, type 2 diabetes, dementia, discitis having completed vancomycin, A-fib, restrictive lung disease.  Patient recently discharged following long hospitalization, admitted from 2024 through 2025.  Patient discharged home with in-home dialysis.  Unfortunately today patient's dialysis machine not working.  Patient's primary nephrologist was contacted, unable to get patient into other outpatient dialysis facilities.  Therefore, decision was made to direct admit patient into the hospital for dialysis as he has not had dialysis for 3 days.  Hospitalist service contacted for admission orders, nephrology consulted for dialysis needs.     Interval Followup:   Per report barely slept overnight  Seen on dialysis  Awake but confused.  Sitter at bedside to help with redirection  Pulling at [facility]  On tube feeds 45 mL/h    Objective   Objective     Vitals:   Temp:  [97.7 °F (36.5 °C)-98.2 °F (36.8 °C)] 97.7 °F (36.5 °C)  Heart Rate:  [86-93] 88  Resp:  [16-20] 20  BP: (141-167)/(57-73) 141/57  Physical Exam    Constitutional: Chronically ill appearing male   Respiratory:  nonlabored respirations    Cardiovascular:  RRR, no edema   Gastrointestinal: soft   Neurologic: Lethargic, oriented x 0   Skin: Extremities warm    Result Review    Result Review:  I have personally reviewed the following over the last 24 hours (07:00 to 07:00) and agree with the following findings  [x]  Laboratory  CBC          2025    04:49 2025    04:50 2025    06:42   CBC   WBC 4.97  6.50  6.68    RBC 3.68  3.23  3.18    Hemoglobin 9.9  8.7  8.6    Hematocrit 32.1  28.6  28.5    MCV 87.2  88.5  89.6    MCH 26.9  26.9  27.0    MCHC 30.8  30.4  30.2    RDW 18.8  19.0  19.2     Platelets 133  145  153      CMP          1/19/2025    04:50 1/20/2025    06:42 1/21/2025    06:15   CMP   Glucose 202  235  242    BUN 35  59  76    Creatinine 4.02  5.04  6.11    EGFR 14.5  11.1  8.8    Sodium 131  129  130    Potassium 3.4  3.3  3.2    Chloride 93  93  93    Calcium 9.0  9.1  9.1    BUN/Creatinine Ratio 8.7  11.7  12.4    Anion Gap 12.6  15.0  15.5      [x]  Microbiology  [x]  Radiology   [x]  EKG/Telemetry   []  Cardiology/Vascular   []  Pathology  []  Old records  [x]  Other:    Intake/Output Summary (Last 24 hours) at 1/21/2025 1546  Last data filed at 1/21/2025 1514  Gross per 24 hour   Intake 1356 ml   Output 2000 ml   Net -644 ml         Assessment & Plan   Assessment / Plan     Assessment/Plan:  Acute hypoxemic hypercapnic respiratory failure requiring mechanical ventilation  Toxic/metabolic encephalopathy   Hospital-acquired delirium  End-stage renal disease on dialysis  NSTEMI suspect type II from demand ischemia  Hypotension  Adrenal insufficiency significantly low random cortisol level  Type 2 diabetes  Acute to subacute nondisplaced periprosthetic fracture involving proximal right femur, managing nonoperatively  Atrial fibrillation not on anticoagulation due to falls  Restrictive lung disease   Dementia           Extubated 1/18. Now weaned to room air.  Switch DuoNebs to every 6 hours as needed. Continue Brovana/Pulmicort nebs twice daily  Pulmonology following, appreciate assistance  Supplemental oxygen prn maintain SpO2 above 90%  Per report, barely sleeping.  Remains very confused. Increase Seroquel to 25 mg nightly.  Continue sitter to help with redirection  EKG to monitor QTc  Blood pressure stable, weaning stress dose steroids, decrease hydrocortisone to 25 mg daily  Timing of HD per nephrology  Continue electrolyte supplementation per orders  Continue Synthroid  Continue low-dose SSI, increase frequency to every 6  Nonoperative management of fracture per Ortho. Continue  scheduled Tylenol  Guarded long-term prognosis. Would be appropriate for hospice/comfort focused measures if desired by family.   Nutrition:.  N.p.o. given AMS.  On tube feeds, per dietitian  CBC, BMP, magnesium and phosphorus level in a.m.    GI ppx: Daily PPI  VTE ppx: SCDs    Discussed plan with RN.    VTE Prophylaxis:  Mechanical VTE prophylaxis orders are present.        CODE STATUS:   Medical Intervention Limits: No intubation (DNI)  Code Status (Patient has no pulse and is not breathing): No CPR (Do Not Attempt to Resuscitate)  Medical Interventions (Patient has pulse or is breathing): Limited Support    Electronically signed by Tyler Smith DO, 01/21/25, 4:03 PM EST.

## 2025-01-21 NOTE — THERAPY EVALUATION
Patient Name: Nelson Wang  : 1945    MRN: [id number]                              Today's Date: 2025       Admit Date: 2025    Visit Dx:     ICD-10-CM ICD-9-CM   1. Chronic respiratory failure with hypoxia  J96.11 518.83     799.02   2. Recurrent pneumonia  J18.9 486   3. Acute on chronic respiratory failure with hypoxia  J96.21 518.84     799.02   4. ESRD (end stage renal disease)  N18.6 585.6   5. Aspiration pneumonia, unspecified aspiration pneumonia type, unspecified laterality, unspecified part of lung  J69.0 507.0   6. Difficulty in walking  R26.2 719.7     Patient Active Problem List   Diagnosis    Essential hypertension    Bradycardia, sinus    Anemia due to chronic kidney disease    Hypothyroidism    Idiopathic gout    Proteinuria    Psoriasis    Thrombocytopenia    Type 2 diabetes mellitus without complication    CKD (chronic kidney disease), stage IV    Hyperlipidemia    Monoclonal gammopathy of unknown significance (MGUS)    Stage 5 chronic kidney disease    Chronic gout without tophus    Peritoneal dialysis catheter dysfunction    Medicare annual wellness visit, subsequent    Chronic cough    Peritonitis associated with peritoneal dialysis    Recurrent pneumonia    Acute on chronic respiratory failure with hypoxia    ESRD (end stage renal disease)    Aspiration pneumonitis    Sepsis    Type 2 diabetes mellitus, with long-term current use of insulin    GERD without esophagitis    Immunosuppression due to drug therapy    Bipolar disorder    Chronic respiratory failure with hypoxia    Shortness of breath    Abnormal stress test    ESRD on dialysis    Abnormal chest CT    Encounter for screening for malignant neoplasm of colon    History of colon polyps    Family history of colon cancer    Intractable pain    Abdominal pain    ESRD (end stage renal disease) on dialysis    AMS (altered mental status)    Hallucinations    LUQ pain    Discitis    Metabolic encephalopathy    Aspiration  pneumonia    Discitis of lumbar region    Severe malnutrition    Dementia    Unspecified severe protein-calorie malnutrition    Hypoxia    Gram-positive bacteremia    Moderate protein-calorie malnutrition     Past Medical History:   Diagnosis Date    Abnormal stress test     Anemia     IRON INFUSIONS WITH DIALYSIS    Anesthesia     WITH HIP REPLACEMENT DAUGHTER BELIEVES HAD SVT 2000    Ankle pain, right     Anxiety and depression     Arthritis     CKD (chronic kidney disease), stage IV     DIALYSIS [id number] SHILEY IN RIGHT CHEST    Diabetes     GERD (gastroesophageal reflux disease)     Gout     High cholesterol     History of peritoneal dialysis     HL (hearing loss)     Hyperkalemia     Hypertension     Hypothyroidism     Insomnia     Night terrors     Psoriasis     Psoriasis     Sleep apnea     Sleep walking     Thrombocytopenia     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    Vitiligo      Past Surgical History:   Procedure Laterality Date    ABDOMINAL SURGERY      ANKLE SURGERY Right 11/1990    APPENDECTOMY N/A 1954    ARTERIOVENOUS FISTULA/SHUNT SURGERY Left 07/16/2024    Procedure: Creation of left arm arteriovenous fistula;  Surgeon: Moses Mir MD;  Location: [facility] MAIN OR;  Service: Vascular;  Laterality: Left;    BRONCHOSCOPY N/A 03/01/2024    Procedure: BRONCHOSCOPY WITH BAL AND WASHINGS;  Surgeon: Sandra Espinal MD;  Location: [facility] ENDOSCOPY;  Service: Pulmonary;  Laterality: N/A;  PNEUMONIA    BRONCHOSCOPY N/A 08/30/2024    Procedure: BRONCHOSCOPY WITH BALS AND WASHINGS;  Surgeon: Sandra Espinal MD;  Location: [facility] ENDOSCOPY;  Service: Pulmonary;  Laterality: N/A;  MUCOUS PLUGGING    CARDIAC CATHETERIZATION N/A 05/29/2024    Procedure: Left Heart Cath with possible coronary angioplasty;  Surgeon: Stephan Nichols MD;  Location: [facility] CATH INVASIVE LOCATION;  Service: Cardiology;  Laterality: N/A;    COLONOSCOPY N/A 06/2022    [facility]    ENDOSCOPY N/A 10/28/2024    Procedure:  ESOPHAGOGASTRODUODENOSCOPY INSERTION OF LIGHTED INSTRUMENT TO VIEW ESOPHAGUS, STOMACH AND SMALL INTESTINE;  Surgeon: Kingsley Peraza MD;  Location: [facility] ENDOSCOPY;  Service: Gastroenterology;  Laterality: N/A;  Gastritis    FRACTURE SURGERY      HIP BIPOLAR REPLACEMENT Right 01/2000    INSERTION HEMODIALYSIS CATHETER Left 04/09/2024    Procedure: HEMODIALYSIS CATHETER INSERTION;  Surgeon: Jose Berry MD;  Location: [facility] OR;  Service: General;  Laterality: Left;    INSERTION HEMODIALYSIS CATHETER N/A 04/12/2024    Procedure: Tunneled hemodialysis catheter insertion;  Surgeon: Enrique Vinson MD;  Location: [facility] OR;  Service: Vascular;  Laterality: N/A;    INSERTION PERITONEAL DIALYSIS CATHETER N/A 03/27/2023    Procedure: LAPAROSCOPIC INSERTION PERITONEAL DIALYSIS CATHETER, LAPAROSCOPIC OMENTOPEXY WITH LYSIS OF ADHESIONS;  Surgeon: Jose Berry MD;  Location: [facility] OR;  Service: General;  Laterality: N/A;    INSERTION PERITONEAL DIALYSIS CATHETER Left 07/23/2023    Procedure: REVISION OF PERITONEAL DIALYSIS CATHETER;  Surgeon: Radha Oreilly MD;  Location: [facility] OR;  Service: General;  Laterality: Left;    JOINT REPLACEMENT      REMOVAL PERITONEAL DIALYSIS CATHETER N/A 04/09/2024    Procedure: REMOVAL PERITONEAL DIALYSIS CATHETER;  Surgeon: Jose Berry MD;  Location: [facility] OR;  Service: General;  Laterality: N/A;    RENAL BIOPSY Left 07/15/2022    UPPER GASTROINTESTINAL ENDOSCOPY      VASCULAR SURGERY      WRIST SURGERY      UNSURE WHICH SIDE DAUGHTER REPORTS HAD SEVERE  CUT FROM WINDOW AND THEY HAD TO DO RECONSTRUCTIVE SURGERY WITH VESSELS AND NERVES      General Information       Row Name 01/21/25 0931          General Information    Prior Level of Function max assist:;dependent:;ADL's  -PG     Existing Precautions/Restrictions fall  -PG     Barriers to Rehab none identified  -PG       Row Name 01/21/25 0931          Occupational  Profile    Reason for Services/Referral (Occupational Profile) Patient is a 79-year-old male admitted secondary to a fall at home sustaining a right periprosthetic proximal femur fracture/nonsurgical.  Patient is being evaluated by Occupational Therapy due to recent decline in ADL function.  No previous OT services identified  -PG       Row Name 01/21/25 0931          Living Environment    People in Home child(laura), adult  -PG       Row Name 01/21/25 0931          Cognition    Orientation Status (Cognition) unable/difficult to assess  -PG               User Key  (r) = Recorded By, (t) = Taken By, (c) = Cosigned By      Initials Name Provider Type    PG Chauncey Goldberg, OT Occupational Therapist                     Mobility/ADL's       Row Name 01/21/25 0933          Activities of Daily Living    BADL Assessment/Intervention bathing;upper body dressing;lower body dressing;grooming;toileting  -PG       Row Name 01/21/25 0933          Bathing Assessment/Intervention    [place] Level (Bathing) bathing skills;dependent (less than 25% patient effort)  -PG       Row Name 01/21/25 0933          Upper Body Dressing Assessment/Training    [place] Level (Upper Body Dressing) upper body dressing skills;dependent (less than 25% patient effort)  -PG       Row Name 01/21/25 0933          Lower Body Dressing Assessment/Training    [place] Level (Lower Body Dressing) lower body dressing skills;dependent (less than 25% patient effort)  -PG       Row Name 01/21/25 0933          Grooming Assessment/Training    [place] Level (Grooming) dependent (less than 25% patient effort);grooming skills  -PG       Row Name 01/21/25 0933          Toileting Assessment/Training    [place] Level (Toileting) toileting skills;dependent (less than 25% patient effort)  -PG               User Key  (r) = Recorded By, (t) = Taken By, (c) = Cosigned By      Initials Name Provider Type    Chauncey Vizcarra, OT Occupational Therapist                    Obj/Interventions       Row Name 01/21/25 0933          Sensory Assessment (Somatosensory)    Sensory Assessment (Somatosensory) unable/difficult to assess  -PG       Row Name 01/21/25 0933          Vision Assessment/Intervention    Visual Impairment/Limitations unable/difficult to assess  -PG       Row Name 01/21/25 0933          Range of Motion Comprehensive    Comment, General Range of Motion Limited active range of motion bilateral upper extremities  -PG       Row Name 01/21/25 0933          Strength Comprehensive (MMT)    Comment, General Manual Muscle Testing (MMT) Assessment Not tested due to poor cognition  -PG       Row Name 01/21/25 0933          Motor Skills    Motor Skills coordination;functional endurance  -PG     Coordination fine motor deficit;gross motor deficit;moderate impairment  -PG     Functional Endurance Poor  -PG               User Key  (r) = Recorded By, (t) = Taken By, (c) = Cosigned By      Initials Name Provider Type    PG Chauncey Goldberg, OT Occupational Therapist                   Goals/Plan    No documentation.                  Clinical Impression       Row Name 01/21/25 0934          Pain Assessment    Pretreatment Pain Rating 0/10 - no pain  -PG     Posttreatment Pain Rating 0/10 - no pain  -PG       Row Name 01/21/25 0934          Plan of Care Review    Plan of Care Reviewed With patient  -PG     Progress no change  -PG     Outcome Evaluation Patient previously dependent with all self-care activities and limited functional mobility with frequent falls reported.  Patient receives full caregiver support at home.  No further occupational therapy services recommended at this time.  Should patient's condition improve, will reevaluate patient for further therapy needs.  -PG       Row Name 01/21/25 0934          Therapy Assessment/Plan (OT)    Criteria for Skilled Therapeutic Interventions Met (OT) no;does not meet criteria for skilled intervention  -PG     Therapy Frequency  (OT) evaluation only  -PG       Row Name 01/21/25 0934          Therapy Plan Review/Discharge Plan (OT)    Anticipated Discharge Disposition (OT) home with 24/7 care  -PG               User Key  (r) = Recorded By, (t) = Taken By, (c) = Cosigned By      Initials Name Provider Type    PG Chauncey Goldberg OT Occupational Therapist                   Outcome Measures       Row Name 01/21/25 0936          How much help from another is currently needed...    Putting on and taking off regular lower body clothing? 1  -PG     Bathing (including washing, rinsing, and drying) 1  -PG     Toileting (which includes using toilet bed pan or urinal) 1  -PG     Putting on and taking off regular upper body clothing 1  -PG     Taking care of personal grooming (such as brushing teeth) 1  -PG     Eating meals 1  -PG     AM-PAC 6 Clicks Score (OT) 6  -PG       Row Name 01/21/25 0936          Functional Assessment    Outcome Measure Options AM-PAC 6 Clicks Daily Activity (OT);Optimal Instrument  -PG       Row Name 01/21/25 0936          Optimal Instrument    Optimal Instrument Optimal - 3  -PG     Bending/Stooping 5  -PG     Standing 5  -PG     Reaching 4  -PG     From the list, choose the 3 activities you would most like to be able to do without any difficulty Bending/stooping;Standing;Reaching  -PG     Total Score Optimal - 3 14  -PG               User Key  (r) = Recorded By, (t) = Taken By, (c) = Cosigned By      Initials Name Provider Type    Chauncey Vizcarra OT Occupational Therapist                    Occupational Therapy Education        No education to display                  OT Recommendation and Plan  Therapy Frequency (OT): evaluation only  Plan of Care Review  Plan of Care Reviewed With: patient  Progress: no change  Outcome Evaluation: Patient previously dependent with all self-care activities and limited functional mobility with frequent falls reported.  Patient receives full caregiver support at home.  No further occupational  therapy services recommended at this time.  Should patient's condition improve, will reevaluate patient for further therapy needs.     Time Calculation:   Evaluation Complexity (OT)  Review Occupational Profile/Medical/Therapy History Complexity: brief/low complexity  Assessment, Occupational Performance/Identification of Deficit Complexity: 1-3 performance deficits  Clinical Decision Making Complexity (OT): problem focused assessment/low complexity  Overall Complexity of Evaluation (OT): low complexity     Time Calculation- OT       Row Name 01/21/25 0937             Time Calculation- OT    OT Received On 01/21/25  -PG         Untimed Charges    OT Eval/Re-eval Minutes 30  -PG         Total Minutes    Untimed Charges Total Minutes 30  -PG       Total Minutes 30  -PG                User Key  (r) = Recorded By, (t) = Taken By, (c) = Cosigned By      Initials Name Provider Type    PG Chauncey Goldberg OT Occupational Therapist                  Therapy Charges for Today       Code Description Service Date Service Provider Modifiers Qty    86065759814 HC OT EVAL LOW COMPLEXITY 2 1/21/2025 Chauncey Goldberg OT GO 1                 Chauncey Goldberg OT  1/21/2025

## 2025-01-21 NOTE — SIGNIFICANT NOTE
01/21/25 0856   Physical Therapy Time and Intention   Session Not Performed patient unavailable for treatment   Comment, Session Not Performed Pt was being taken to dialysis.

## 2025-01-21 NOTE — SIGNIFICANT NOTE
01/21/25 1428   OTHER   Discipline speech language pathologist   Rehab Time/Intention   Session Not Performed patient unavailable for evaluation   Recommendations   SLP - Next Appointment 01/22/25

## 2025-01-21 NOTE — NURSING NOTE
Duration of Treatment 3.5 Hours  Access Site AVF  Dialyzer Revaclear   mL/min  Dialysate Temperature (C) 36  BFR-As tolerated to a maximum of: 400 mL/min  Prime Dialyzer With NS to Priming Volume? Yes  Dialysate Solution Bath: K+ = 4 mEq, Ca = 2.5mEq  Bicarb 35 mEq  Na+ 137 meq  Fluid Removal: 2L    Patient tolerated treatment well. Safety sitter at bedside.   Ran 3.5hrs, removed 2000mls UF with BVP 82  Mid-treatment had air in venous line, paused treatment, unable to return blood due to air in the lines, changed out cartridge then continued treatment with no further problems.  Reported off to Valerie MARADIAGA

## 2025-01-21 NOTE — PROGRESS NOTES
LOS: 10 days   Patient Care Team:  Domingo Bravo MD as PCP - General (Internal Medicine)  Josephine Warren APRN as Nurse Practitioner (Pulmonary Disease)  Virginie Lopez APRN as Nurse Practitioner (Pulmonary Disease)    Chief Complaint:  Renal issues    Subjective   No new events.  Seen on dialysis, remains confused restless  Sitter at bedside.    acetaminophen, 650 mg, Nasogastric, Q6H  arformoterol, 15 mcg, Nebulization, BID - RT  artificial tears, , Both Eyes, 4x Daily  atorvastatin, 40 mg, Nasogastric, Daily  budesonide, 0.5 mg, Nebulization, BID - RT  hydrocortisone sodium succinate, 25 mg, Intravenous, Q12H  insulin lispro, 2-7 Units, Subcutaneous, TID AC  ipratropium-albuterol, 3 mL, Nebulization, BID - RT  levothyroxine, 175 mcg, Nasogastric, Q AM  ofloxacin, 2 drop, Left Eye, 4x Daily  pantoprazole, 40 mg, Intravenous, Q AM  QUEtiapine, 12.5 mg, Nasogastric, Nightly  senna-docusate sodium, 2 tablet, Nasogastric, BID  [Held by provider] sevelamer, 800 mg, Oral, TID With Meals  sodium chloride, 10 mL, Intravenous, Q12H  sodium chloride, 10 mL, Intravenous, Q12H           Objective     Vital Signs  Temp:  [97.7 °F (36.5 °C)-98.2 °F (36.8 °C)] 98.2 °F (36.8 °C)  Heart Rate:  [86-93] 87  Resp:  [16-20] 20  BP: (131-167)/(57-73) 131/65    Intake/Output Summary (Last 24 hours) at 1/21/2025 0839  Last data filed at 1/21/2025 0600  Gross per 24 hour   Intake 796 ml   Output --   Net 796 ml             Physical Exam:     General Appearance:    Poorly responsive,  in no acute distress, orally intubated, on vent..   Head:    Normocephalic, without obvious abnormality, atraumatic   Throat:    Oral mucosa appear dry   Neck:   No adenopathy, supple, no JVD       Lungs:     Clear to auscultation,respirations unlabored, no wheezes or rhonchi    Heart:    Regular rate and rhythm , normal S1 and S2, no            murmur, no gallop, no rub   Abdomen:     Normal bowel sounds, no masses, soft non-tender   Extremities:     "No lower extremity edema, no cyanosis, L arm fistula is patent.       Skin:   Dry, No rash.  Skin discoloration from psoriasis noted.                  Results Review:    Results from last 7 days   Lab Units 01/21/25  0615 01/20/25  0642 01/19/25  0450   SODIUM mmol/L 130* 129* 131*   POTASSIUM mmol/L 3.2* 3.3* 3.4*   CHLORIDE mmol/L 93* 93* 93*   CO2 mmol/L 21.5* 21.0* 25.4   BUN mg/dL 76* 59* 35*   CREATININE mg/dL 6.11* 5.04* 4.02*   GLUCOSE mg/dL 242* 235* 202*   CALCIUM mg/dL 9.1 9.1 9.0     Results from last 7 days   Lab Units 01/20/25  0642 01/19/25  0450 01/18/25  0449   WBC 10*3/mm3 6.68 6.50 4.97   HEMOGLOBIN g/dL 8.6* 8.7* 9.9*   HEMATOCRIT % 28.5* 28.6* 32.1*   PLATELETS 10*3/mm3 153 145 133*     Magnesium   Date Value Ref Range Status   01/21/2025 2.2 1.6 - 2.4 mg/dL Final   01/20/2025 2.1 1.6 - 2.4 mg/dL Final   01/19/2025 2.0 1.6 - 2.4 mg/dL Final     Phosphorus   Date Value Ref Range Status   01/21/2025 2.0 (L) 2.5 - 4.5 mg/dL Final   01/20/2025 2.0 (L) 2.5 - 4.5 mg/dL Final   01/19/2025 2.5 2.5 - 4.5 mg/dL Final     No results found for: \"BNP\"  Lab Results   Component Value Date    ALBUMIN 2.6 (L) 01/18/2025      Brief Urine Lab Results  (Last result in the past 365 days)        Color   Clarity   Blood   Leuk Est   Nitrite   Protein   CREAT   Urine HCG        10/27/24 1417 Yellow   Cloudy   Negative   Trace   Negative   >=300 mg/dL (3+)                    No radiology results from the last 24 hrs         Assessment & Plan       ESRD (end stage renal disease) on dialysis    Moderate protein-calorie malnutrition    - ESRD, was on home dialysis prior to admission, has left upper extremity AV fistula for access.  Electrolytes are stable.  Dialysis m/w/f previously.  Had dialysis today.        - Type 2 diabetes with complications.     - Hypotension blood pressure is better now.  UF as tolerated.    - Anemia of chronic kidney disease, close to goal     - Altered mentation.  Delirium, was getting slowly " better, but no intubated on the vent.    _R femur FX- ortho seeing    Please call if any questions  [phone]

## 2025-01-21 NOTE — PLAN OF CARE
Goal Outcome Evaluation:  Plan of Care Reviewed With: patient        Progress: no change  Outcome Evaluation: Vitals stable. No complaints from pt but restless most of shift. Sitter at bedside for safety. Cortrak in place, TF continue at goal, pt tolerating well. Will document any changes.

## 2025-01-21 NOTE — PROGRESS NOTES
Pulmonary / Critical Care Progress Note      Patient Name: Nelson Wang  : 1945  MRN: [id number]  Attending:  Tyler Smith DO   Date of admission: 2025    Subjective   Follow-up for respiratory failure,  Follow-up for resp failure, ESRD, delirium, toxic metabolic encephalopathy, CO2 narcosis, aspiration pneumonia, chronic compensated diastolic congestive heart failure.    Over last 24 hours,  Continued to remain restless.  Remained on Brovana, Pulmicort and DuoNeb.  Remained on tube feeding with Cortrak in place.    This afternoon, remains on 2 L nasal cannula  Cortrak in place  He is pulling on things.  Family at bedside.  Tube feeds at goal.   Had dialysis this afternoon.      Objective   Objective     Vitals:   Vital signs for last 24 hours:  Temp:  [97.7 °F (36.5 °C)-98.2 °F (36.8 °C)] 97.7 °F (36.5 °C)  Heart Rate:  [86-93] 86  Resp:  [16-20] 16  BP: (138-167)/(57-73) 141/57    Intake/Output last 3 shifts:  I/O last 3 completed shifts:  In: 1548 [Other:520; NG/GT:1028]  Out: -     Vent settings for last 24 hours:       Physical Exam:  Vital Signs Reviewed   General: Frail critically ill-appearing male awake, pulling on things, in mild distress  HEENT:  PERRL, EOMI. MMM  Chest: diminished with crackles bilaterally, has mild increased work of breathing   CV: RRR, no MGR, pulses 2+, equal.  Abd:  Soft, NT, ND, + BS, no HSM  EXT:  no clubbing, no cyanosis, no edema  Neuro: Somnolent, arousable, has generalized weakness  Skin: No rashes or lesions noted      Result Review    Result Review:  I have personally reviewed the results from the time of this admission to 2025 07:03 EST and agree with these findings:  [x]  Laboratory  [x]  Microbiology  [x]  Radiology  [x]  EKG/Telemetry   [x]  Cardiology/Vascular   []  Pathology  []  Old records  []  Other:  Most notable findings include:       Lab 25  0615 25  0642 25  0450 25  0449 25  0638 25  0349 25  0231  01/15/25  0433   WBC  --  6.68 6.50 4.97 4.86  --  5.25 5.22   HEMOGLOBIN  --  8.6* 8.7* 9.9* 9.4*  --  9.4* 9.7*   HEMATOCRIT  --  28.5* 28.6* 32.1* 32.9*  --  32.5* 33.2*   PLATELETS  --  153 145 133* 141  --  159 177   SODIUM 130* 129* 131* 129*  --  131* 133* 132*   POTASSIUM 3.2* 3.3* 3.4* 4.0  --  4.0 4.0 4.1   CHLORIDE 93* 93* 93* 93*  --  96* 97* 96*   CO2 21.5* 21.0* 25.4 25.7  --  22.1 26.0 23.8   BUN 76* 59* 35* 18  --  17 15 21   CREATININE 6.11* 5.04* 4.02* 2.89*  --  3.91* 4.00* 4.57*   GLUCOSE 242* 235* 202* 210*  --  98 93 119*   CALCIUM 9.1 9.1 9.0 8.9  --  9.4 9.6 9.7   PHOSPHORUS 2.0* 2.0* 2.5 2.2*  --  4.6* 5.2* 4.0   TOTAL PROTEIN  --   --   --   --   --   --  6.5 7.0   ALBUMIN  --   --   --  2.6*  --  2.6* 2.5* 3.0*   GLOBULIN  --   --   --   --   --   --  4.0 4.0     CT Head Without Contrast Stroke Protocol    Result Date: 1/12/2025  CT HEAD WO CONTRAST STROKE PROTOCOL Date of Exam: 1/12/2025 10:31 AM EST Indication: stroke r/o. Comparison: CT head 12/30/2024 Technique: Axial CT images were obtained of the head without contrast administration.  Reconstructed coronal and sagittal images were also obtained. Automated exposure control and iterative construction methods were used. Scan Time: 10:43 a.m. Results discussed with Nathaniel at 10:56 a.m.. Findings: There is no evidence of acute infarction. There there is no acute intracranial hemorrhage. There are no extra-axial collections. Ventricles and CSF spaces are symmetric. No mass effect nor hydrocephalus. There are periventricular white matter changes and cerebral atrophy which appears age-related.  There is fluid opacification of the right mastoid air cells. Paranasal sinuses appear aerated.  Osseous structures and orbits appear intact. There is some soft tissue prominence along the right posterior calvarium which may represent a small hematoma.     Impression: Impression: Stable examination without acute intracranial process. Electronically  Signed: Margaret Hammond MD  1/12/2025 10:57 AM EST  Workstation ID: [id number]     Results for orders placed during the hospital encounter of 01/11/25    Adult Transthoracic Echo Limited W/ Cont if Necessary Per Protocol    Interpretation Summary    Left ventricular ejection fraction appears to be 56 - 60%.  No obvious wall motion abnormalities.    Some type of catheter appears in the right atrium.    This is a limited echo for ejection fraction.      EEG with features consistent with encephalopathy    Cortisol 1.30    Assessment & Plan   Assessment / Plan     Active Hospital Problems:  Active Hospital Problems    Diagnosis     **ESRD (end stage renal disease) on dialysis     Moderate protein-calorie malnutrition      Impression:  Altered mental status  Acute hypoxemic and hypercapnic respiratory failure require mechanical ventilation  Toxic/metabolic encephalopathy  CO2 narcosis  Toxic/metabolic encephalopathy possible hypoactive delirium  Hypotension  NSTEMI, likely from demand ischemia/type II  End-stage renal disease on hemodialysis  Chronic compensated diastolic congestive heart failure  Possible sepsis  Stroke rule out  Type 2 diabetes, now with hypoglycemia  Discitis status post course of vancomycin  Atrial fibrillation  Small chronic right pleural effusion  Femur fracture relative adrenal insufficiency        Plan:  -Continue supplemental O2.  Titrate to maintain SpO2 90% or greater  -Decreased hydrocortisone 25 mg twice daily.  We will consider stopping it tomorrow.  -Continue Synthroid  -Echocardiogram with EF 56 to 60% no evidence of wall motion abnormalities.   -Antibiotics stopped.  Suspect hypotension was secondary to adrenal insufficiency  -Dialysis per nephrology.  Appreciate assistance  -EEG earlier this hospitalization consistent with encephalopathy  -Cortrak in place.  Tube feeds at goal per dietitian recommendations  -SSI to optimize glucose control     GI prophylaxis with Protonix    DNR/DNI now.   Prognosis very poor    VTE Prophylaxis:  Mechanical VTE prophylaxis orders are present.    CODE STATUS:   Medical Intervention Limits: No intubation (DNI)  Code Status (Patient has no pulse and is not breathing): No CPR (Do Not Attempt to Resuscitate)  Medical Interventions (Patient has pulse or is breathing): Limited Support    I have reviewed labs, imaging, pertinent clinical data and provider notes.   I have discussed with bedside nurse and primary service.     Electronically signed by Preston Jimenez MD, 01/21/25, 7:03 AM EST.

## 2025-01-22 LAB
ANION GAP SERPL CALCULATED.3IONS-SCNC: 6.9 MMOL/L (ref 5–15)
BACTERIA SPEC AEROBE CULT: NORMAL
BACTERIA SPEC AEROBE CULT: NORMAL
BUN SERPL-MCNC: 45 MG/DL (ref 8–23)
BUN/CREAT SERPL: 12.6 (ref 7–25)
CALCIUM SPEC-SCNC: 8.8 MG/DL (ref 8.6–10.5)
CHLORIDE SERPL-SCNC: 95 MMOL/L (ref 98–107)
CO2 SERPL-SCNC: 30.1 MMOL/L (ref 22–29)
CREAT SERPL-MCNC: 3.56 MG/DL (ref 0.76–1.27)
DEPRECATED RDW RBC AUTO: 63 FL (ref 37–54)
EGFRCR SERPLBLD CKD-EPI 2021: 16.8 ML/MIN/1.73
ERYTHROCYTE [DISTWIDTH] IN BLOOD BY AUTOMATED COUNT: 19.2 % (ref 12.3–15.4)
GLUCOSE BLDC GLUCOMTR-MCNC: 145 MG/DL (ref 70–99)
GLUCOSE BLDC GLUCOMTR-MCNC: 183 MG/DL (ref 70–99)
GLUCOSE BLDC GLUCOMTR-MCNC: 207 MG/DL (ref 70–99)
GLUCOSE BLDC GLUCOMTR-MCNC: 223 MG/DL (ref 70–99)
GLUCOSE SERPL-MCNC: 148 MG/DL (ref 65–99)
HCT VFR BLD AUTO: 28.4 % (ref 37.5–51)
HGB BLD-MCNC: 8.7 G/DL (ref 13–17.7)
MAGNESIUM SERPL-MCNC: 1.9 MG/DL (ref 1.6–2.4)
MCH RBC QN AUTO: 27.3 PG (ref 26.6–33)
MCHC RBC AUTO-ENTMCNC: 30.6 G/DL (ref 31.5–35.7)
MCV RBC AUTO: 89 FL (ref 79–97)
PHOSPHATE SERPL-MCNC: 1.4 MG/DL (ref 2.5–4.5)
PLATELET # BLD AUTO: 154 10*3/MM3 (ref 140–450)
PMV BLD AUTO: 9.9 FL (ref 6–12)
POTASSIUM SERPL-SCNC: 3.4 MMOL/L (ref 3.5–5.2)
QT INTERVAL: 393 MS
QT INTERVAL: 462 MS
QTC INTERVAL: 465 MS
QTC INTERVAL: 492 MS
RBC # BLD AUTO: 3.19 10*6/MM3 (ref 4.14–5.8)
SODIUM SERPL-SCNC: 132 MMOL/L (ref 136–145)
WBC NRBC COR # BLD AUTO: 6.43 10*3/MM3 (ref 3.4–10.8)

## 2025-01-22 PROCEDURE — 99233 SBSQ HOSP IP/OBS HIGH 50: CPT | Performed by: INTERNAL MEDICINE

## 2025-01-22 PROCEDURE — 93010 ELECTROCARDIOGRAM REPORT: CPT | Performed by: SPECIALIST

## 2025-01-22 PROCEDURE — 93005 ELECTROCARDIOGRAM TRACING: CPT | Performed by: INTERNAL MEDICINE

## 2025-01-22 PROCEDURE — 94799 UNLISTED PULMONARY SVC/PX: CPT

## 2025-01-22 PROCEDURE — 97110 THERAPEUTIC EXERCISES: CPT

## 2025-01-22 PROCEDURE — 82948 REAGENT STRIP/BLOOD GLUCOSE: CPT

## 2025-01-22 PROCEDURE — 25010000002 HYDROCORTISONE SOD SUC (PF) 100 MG RECONSTITUTED SOLUTION: Performed by: INTERNAL MEDICINE

## 2025-01-22 PROCEDURE — 94668 MNPJ CHEST WALL SBSQ: CPT

## 2025-01-22 PROCEDURE — 25810000003 SODIUM CHLORIDE 0.9 % SOLUTION: Performed by: STUDENT IN AN ORGANIZED HEALTH CARE EDUCATION/TRAINING PROGRAM

## 2025-01-22 PROCEDURE — 94664 DEMO&/EVAL PT USE INHALER: CPT

## 2025-01-22 PROCEDURE — 99232 SBSQ HOSP IP/OBS MODERATE 35: CPT | Performed by: INTERNAL MEDICINE

## 2025-01-22 PROCEDURE — 94761 N-INVAS EAR/PLS OXIMETRY MLT: CPT

## 2025-01-22 PROCEDURE — 92610 EVALUATE SWALLOWING FUNCTION: CPT

## 2025-01-22 PROCEDURE — 83735 ASSAY OF MAGNESIUM: CPT | Performed by: INTERNAL MEDICINE

## 2025-01-22 PROCEDURE — 85027 COMPLETE CBC AUTOMATED: CPT | Performed by: INTERNAL MEDICINE

## 2025-01-22 PROCEDURE — 25010000002 EPOETIN ALFA PER 1000 UNITS: Performed by: INTERNAL MEDICINE

## 2025-01-22 PROCEDURE — 84100 ASSAY OF PHOSPHORUS: CPT | Performed by: INTERNAL MEDICINE

## 2025-01-22 PROCEDURE — 63710000001 INSULIN LISPRO (HUMAN) PER 5 UNITS: Performed by: INTERNAL MEDICINE

## 2025-01-22 PROCEDURE — 80048 BASIC METABOLIC PNL TOTAL CA: CPT | Performed by: INTERNAL MEDICINE

## 2025-01-22 RX ORDER — FENTANYL/ROPIVACAINE/NS/PF 2-625MCG/1
15 PLASTIC BAG, INJECTION (ML) EPIDURAL ONCE
Status: COMPLETED | OUTPATIENT
Start: 2025-01-22 | End: 2025-01-22

## 2025-01-22 RX ORDER — QUETIAPINE FUMARATE 25 MG/1
12.5 TABLET, FILM COATED ORAL NIGHTLY
Status: DISCONTINUED | OUTPATIENT
Start: 2025-01-22 | End: 2025-01-24

## 2025-01-22 RX ORDER — QUETIAPINE FUMARATE 25 MG/1
12.5 TABLET, FILM COATED ORAL NIGHTLY PRN
Status: DISCONTINUED | OUTPATIENT
Start: 2025-01-22 | End: 2025-01-23

## 2025-01-22 RX ADMIN — OFLOXACIN 2 DROP: 3 SOLUTION OPHTHALMIC at 17:46

## 2025-01-22 RX ADMIN — INSULIN LISPRO 3 UNITS: 100 INJECTION, SOLUTION INTRAVENOUS; SUBCUTANEOUS at 17:45

## 2025-01-22 RX ADMIN — INSULIN LISPRO 2 UNITS: 100 INJECTION, SOLUTION INTRAVENOUS; SUBCUTANEOUS at 12:25

## 2025-01-22 RX ADMIN — Medication 10 ML: at 09:13

## 2025-01-22 RX ADMIN — Medication 10 ML: at 22:07

## 2025-01-22 RX ADMIN — QUETIAPINE FUMARATE 12.5 MG: 25 TABLET ORAL at 21:06

## 2025-01-22 RX ADMIN — OFLOXACIN 2 DROP: 3 SOLUTION OPHTHALMIC at 09:13

## 2025-01-22 RX ADMIN — SODIUM CHLORIDE 15 MMOL: 9 INJECTION, SOLUTION INTRAVENOUS at 09:12

## 2025-01-22 RX ADMIN — PANTOPRAZOLE SODIUM 40 MG: 40 INJECTION, POWDER, FOR SOLUTION INTRAVENOUS at 06:12

## 2025-01-22 RX ADMIN — ARFORMOTEROL TARTRATE 15 MCG: 15 SOLUTION RESPIRATORY (INHALATION) at 20:53

## 2025-01-22 RX ADMIN — WHITE PETROLATUM 57.7 %-MINERAL OIL 31.9 % EYE OINTMENT: at 22:06

## 2025-01-22 RX ADMIN — ACETAMINOPHEN 650 MG: 160 SOLUTION ORAL at 14:41

## 2025-01-22 RX ADMIN — ACETAMINOPHEN 650 MG: 160 SOLUTION ORAL at 21:05

## 2025-01-22 RX ADMIN — INSULIN LISPRO 3 UNITS: 100 INJECTION, SOLUTION INTRAVENOUS; SUBCUTANEOUS at 00:22

## 2025-01-22 RX ADMIN — WHITE PETROLATUM 57.7 %-MINERAL OIL 31.9 % EYE OINTMENT: at 12:25

## 2025-01-22 RX ADMIN — LEVOTHYROXINE SODIUM 175 MCG: 75 TABLET ORAL at 06:13

## 2025-01-22 RX ADMIN — OFLOXACIN 2 DROP: 3 SOLUTION OPHTHALMIC at 12:25

## 2025-01-22 RX ADMIN — ERYTHROPOIETIN 30000 UNITS: 20000 INJECTION, SOLUTION INTRAVENOUS; SUBCUTANEOUS at 09:12

## 2025-01-22 RX ADMIN — HYDROCORTISONE SODIUM SUCCINATE 25 MG: 100 INJECTION, POWDER, FOR SOLUTION INTRAMUSCULAR; INTRAVENOUS at 09:10

## 2025-01-22 RX ADMIN — ACETAMINOPHEN 650 MG: 160 SOLUTION ORAL at 02:52

## 2025-01-22 RX ADMIN — BUDESONIDE 0.5 MG: 0.5 INHALANT ORAL at 20:53

## 2025-01-22 RX ADMIN — SENNOSIDES AND DOCUSATE SODIUM 2 TABLET: 50; 8.6 TABLET ORAL at 21:06

## 2025-01-22 RX ADMIN — ATORVASTATIN CALCIUM 40 MG: 40 TABLET, FILM COATED ORAL at 09:11

## 2025-01-22 RX ADMIN — ACETAMINOPHEN 650 MG: 160 SOLUTION ORAL at 09:11

## 2025-01-22 RX ADMIN — BUDESONIDE 0.5 MG: 0.5 INHALANT ORAL at 06:40

## 2025-01-22 RX ADMIN — OFLOXACIN 2 DROP: 3 SOLUTION OPHTHALMIC at 21:08

## 2025-01-22 RX ADMIN — WHITE PETROLATUM 57.7 %-MINERAL OIL 31.9 % EYE OINTMENT: at 17:46

## 2025-01-22 RX ADMIN — ARFORMOTEROL TARTRATE 15 MCG: 15 SOLUTION RESPIRATORY (INHALATION) at 06:40

## 2025-01-22 RX ADMIN — WHITE PETROLATUM 57.7 %-MINERAL OIL 31.9 % EYE OINTMENT: at 09:13

## 2025-01-22 NOTE — PROGRESS NOTES
[facility]   Hospitalist Progress Note  Date: 2025  Patient Name: Nelson Wang  : 1945  MRN: [id number]  Date of admission: 2025      Subjective   Subjective     Chief Complaint: Follow up for home dialysis unit malfunction    Summary:  79 y.o. ESRD on dialysis, type 2 diabetes, dementia, discitis having completed vancomycin, A-fib, restrictive lung disease.  Patient recently discharged following long hospitalization, admitted from 2024 through 2025.  Patient discharged home with in-home dialysis.  Unfortunately today patient's dialysis machine not working.  Patient's primary nephrologist was contacted, unable to get patient into other outpatient dialysis facilities.  Therefore, decision was made to direct admit patient into the hospital for dialysis as he has not had dialysis for 3 days.  Hospitalist service contacted for admission orders, nephrology consulted for dialysis needs.     Interval Followup:   Per report, got better sleep last night  Vital stable  Family reports he is a little more engaged today  Got up to bedside commode with assist  Participated with speech therapy    Objective   Objective     Vitals:   Temp:  [97.5 °F (36.4 °C)-99 °F (37.2 °C)] 97.5 °F (36.4 °C)  Heart Rate:  [79-88] 79  Resp:  [16-20] 18  BP: (108-161)/(52-87) 134/58  Physical Exam    Constitutional: Chronically ill appearing male, NAD   Respiratory:  nonlabored respirations    Cardiovascular:  RRR, no edema   Gastrointestinal: soft   Neurologic: Lethargic, oriented x 0   Skin: Extremities warm    Result Review    Result Review:  I have personally reviewed the following over the last 24 hours (07:00 to 07:00) and agree with the following findings  [x]  Laboratory  CBC          2025    04:50 2025    06:42 2025    05:29   CBC   WBC 6.50  6.68  6.43    RBC 3.23  3.18  3.19    Hemoglobin 8.7  8.6  8.7    Hematocrit 28.6  28.5  28.4    MCV 88.5  89.6  89.0    MCH 26.9  27.0  27.3    MCHC  30.4  30.2  30.6    RDW 19.0  19.2  19.2    Platelets 145  153  154      CMP          1/20/2025    06:42 1/21/2025    06:15 1/22/2025    05:29   CMP   Glucose 235  242  148    BUN 59  76  45    Creatinine 5.04  6.11  3.56    EGFR 11.1  8.8  16.8    Sodium 129  130  132    Potassium 3.3  3.2  3.4    Chloride 93  93  95    Calcium 9.1  9.1  8.8    BUN/Creatinine Ratio 11.7  12.4  12.6    Anion Gap 15.0  15.5  6.9      []  Microbiology  []  Radiology   []  EKG/Telemetry   []  Cardiology/Vascular   []  Pathology  []  Old records  [x]  Other:    Intake/Output Summary (Last 24 hours) at 1/22/2025 0808  Last data filed at 1/22/2025 0619  Gross per 24 hour   Intake 1289 ml   Output 2000 ml   Net -711 ml         Assessment & Plan   Assessment / Plan     Assessment/Plan:  Acute hypoxemic hypercapnic respiratory failure requiring mechanical ventilation  Toxic/metabolic encephalopathy   Hospital-acquired delirium  End-stage renal disease on dialysis  NSTEMI suspect type II from demand ischemia  Hypotension  Hypokalemia  Phosphatemia  Adrenal insufficiency significantly low random cortisol level  Type 2 diabetes  Acute to subacute nondisplaced periprosthetic fracture involving proximal right femur, managing nonoperatively  Atrial fibrillation not on anticoagulation due to falls  Restrictive lung disease   Dementia         Pulmonology following, appreciate assistance  Extubated 1/18.  Now on room air.  Continue scheduled nebulizers. Supplemental oxygen prn maintain SpO2 above 90%  Improved sleep overnight with Seroquel 25 mg. Qtc 465.  Was more engaged this morning, to bedside commode and participated with SLP  Blood pressure stable, stress dose steroids weaned, stop hydrocortisone today  Continue Synthroid  Blood sugars controlled.  Continue low-dose SSI every 6 hours  Timing of HD per nephrology  Status post IV potassium phosphate 15 mmol x 1  Nonoperative management of fracture per Ortho. Continue scheduled  Tylenol  Nutrition:.  SLP following, diet advanced this morning.  Continue aspiration precautions, one-to-one assist with feeding.  Keep Cortrak, tube feeds going, dietitian following  CBC, BMP, magnesium and phosphorus level in a.m.  Guarded long-term prognosis.     GI ppx: Daily PPI  VTE ppx: SCDs    Discussed plan with RN.    VTE Prophylaxis:  Mechanical VTE prophylaxis orders are present.    CODE STATUS:   Medical Intervention Limits: No intubation (DNI)  Code Status (Patient has no pulse and is not breathing): No CPR (Do Not Attempt to Resuscitate)  Medical Interventions (Patient has pulse or is breathing): Limited Support    Electronically signed by Tyler Smith DO, 01/22/25, 1:40 PM EST.

## 2025-01-22 NOTE — THERAPY TREATMENT NOTE
Acute Care - Physical Therapy Treatment Note  BRIA Sullivan     Patient Name: Nelson Wang  : 1945  MRN: [id number]  Today's Date: 2025   Onset of Illness/Injury or Date of Surgery: 25  Visit Dx:     ICD-10-CM ICD-9-CM   1. Chronic respiratory failure with hypoxia  J96.11 518.83     799.02   2. Recurrent pneumonia  J18.9 486   3. Acute on chronic respiratory failure with hypoxia  J96.21 518.84     799.02   4. ESRD (end stage renal disease)  N18.6 585.6   5. Aspiration pneumonia, unspecified aspiration pneumonia type, unspecified laterality, unspecified part of lung  J69.0 507.0   6. Difficulty in walking  R26.2 719.7   7. Oropharyngeal dysphagia  R13.12 787.22     Patient Active Problem List   Diagnosis    Essential hypertension    Bradycardia, sinus    Anemia due to chronic kidney disease    Hypothyroidism    Idiopathic gout    Proteinuria    Psoriasis    Thrombocytopenia    Type 2 diabetes mellitus without complication    CKD (chronic kidney disease), stage IV    Hyperlipidemia    Monoclonal gammopathy of unknown significance (MGUS)    Stage 5 chronic kidney disease    Chronic gout without tophus    Peritoneal dialysis catheter dysfunction    Medicare annual wellness visit, subsequent    Chronic cough    Peritonitis associated with peritoneal dialysis    Recurrent pneumonia    Acute on chronic respiratory failure with hypoxia    ESRD (end stage renal disease)    Aspiration pneumonitis    Sepsis    Type 2 diabetes mellitus, with long-term current use of insulin    GERD without esophagitis    Immunosuppression due to drug therapy    Bipolar disorder    Chronic respiratory failure with hypoxia    Shortness of breath    Abnormal stress test    ESRD on dialysis    Abnormal chest CT    Encounter for screening for malignant neoplasm of colon    History of colon polyps    Family history of colon cancer    Intractable pain    Abdominal pain    ESRD (end stage renal disease) on dialysis    AMS (altered  mental status)    Hallucinations    LUQ pain    Discitis    Metabolic encephalopathy    Aspiration pneumonia    Discitis of lumbar region    Severe malnutrition    Dementia    Unspecified severe protein-calorie malnutrition    Hypoxia    Gram-positive bacteremia    Moderate protein-calorie malnutrition     Past Medical History:   Diagnosis Date    Abnormal stress test     Anemia     IRON INFUSIONS WITH DIALYSIS    Anesthesia     WITH HIP REPLACEMENT DAUGHTER BELIEVES HAD SVT 2000    Ankle pain, right     Anxiety and depression     Arthritis     CKD (chronic kidney disease), stage IV     DIALYSIS [id number] SHILEY IN RIGHT CHEST    Diabetes     GERD (gastroesophageal reflux disease)     Gout     High cholesterol     History of peritoneal dialysis     HL (hearing loss)     Hyperkalemia     Hypertension     Hypothyroidism     Insomnia     Night terrors     Psoriasis     Psoriasis     Sleep apnea     Sleep walking     Thrombocytopenia     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    Vitiligo      Past Surgical History:   Procedure Laterality Date    ABDOMINAL SURGERY      ANKLE SURGERY Right 11/1990    APPENDECTOMY N/A 1954    ARTERIOVENOUS FISTULA/SHUNT SURGERY Left 07/16/2024    Procedure: Creation of left arm arteriovenous fistula;  Surgeon: Moses Mir MD;  Location: [facility] MAIN OR;  Service: Vascular;  Laterality: Left;    BRONCHOSCOPY N/A 03/01/2024    Procedure: BRONCHOSCOPY WITH BAL AND WASHINGS;  Surgeon: Sandra Espinal MD;  Location: [facility] ENDOSCOPY;  Service: Pulmonary;  Laterality: N/A;  PNEUMONIA    BRONCHOSCOPY N/A 08/30/2024    Procedure: BRONCHOSCOPY WITH BALS AND WASHINGS;  Surgeon: Sandra Espinal MD;  Location: [facility] ENDOSCOPY;  Service: Pulmonary;  Laterality: N/A;  MUCOUS PLUGGING    CARDIAC CATHETERIZATION N/A 05/29/2024    Procedure: Left Heart Cath with possible coronary angioplasty;  Surgeon: Stephan Nichols MD;  Location: [facility] CATH INVASIVE LOCATION;  Service: Cardiology;   Laterality: N/A;    COLONOSCOPY N/A 06/2022    [facility]    ENDOSCOPY N/A 10/28/2024    Procedure: ESOPHAGOGASTRODUODENOSCOPY INSERTION OF LIGHTED INSTRUMENT TO VIEW ESOPHAGUS, STOMACH AND SMALL INTESTINE;  Surgeon: Kingsley Peraza MD;  Location: [facility] ENDOSCOPY;  Service: Gastroenterology;  Laterality: N/A;  Gastritis    FRACTURE SURGERY      HIP BIPOLAR REPLACEMENT Right 01/2000    INSERTION HEMODIALYSIS CATHETER Left 04/09/2024    Procedure: HEMODIALYSIS CATHETER INSERTION;  Surgeon: Jose Berry MD;  Location: [facility] OR;  Service: General;  Laterality: Left;    INSERTION HEMODIALYSIS CATHETER N/A 04/12/2024    Procedure: Tunneled hemodialysis catheter insertion;  Surgeon: Enrique Vinson MD;  Location: [facility] OR;  Service: Vascular;  Laterality: N/A;    INSERTION PERITONEAL DIALYSIS CATHETER N/A 03/27/2023    Procedure: LAPAROSCOPIC INSERTION PERITONEAL DIALYSIS CATHETER, LAPAROSCOPIC OMENTOPEXY WITH LYSIS OF ADHESIONS;  Surgeon: Jose Berry MD;  Location: [facility] OR;  Service: General;  Laterality: N/A;    INSERTION PERITONEAL DIALYSIS CATHETER Left 07/23/2023    Procedure: REVISION OF PERITONEAL DIALYSIS CATHETER;  Surgeon: Radha Oreilly MD;  Location: [facility] OR;  Service: General;  Laterality: Left;    JOINT REPLACEMENT      REMOVAL PERITONEAL DIALYSIS CATHETER N/A 04/09/2024    Procedure: REMOVAL PERITONEAL DIALYSIS CATHETER;  Surgeon: Jose Berry MD;  Location: [facility] OR;  Service: General;  Laterality: N/A;    RENAL BIOPSY Left 07/15/2022    UPPER GASTROINTESTINAL ENDOSCOPY      VASCULAR SURGERY      WRIST SURGERY      UNSURE WHICH SIDE DAUGHTER REPORTS HAD SEVERE  CUT FROM WINDOW AND THEY HAD TO DO RECONSTRUCTIVE SURGERY WITH VESSELS AND NERVES     PT Assessment (Last 12 Hours)       PT Evaluation and Treatment       Row Name 01/22/25 7296          Physical Therapy Time and Intention    Subjective Information no complaints   -DK     Document Type therapy note (daily note)  -DK     Mode of Treatment individual therapy;physical therapy  -DK     Patient Effort good  -DK     Symptoms Noted During/After Treatment none  -DK     Comment Pt had gotten up to the [facility] not long before PT session.  He was very fatigued, having difficulty staying awake for treatment. Sitter in the room.  -       Row Name 01/22/25 1435          Pain    Pretreatment Pain Rating 0/10 - no pain  -DK     Posttreatment Pain Rating 0/10 - no pain  -       Row Name 01/22/25 1435          Cognition    Affect/Mental Status (Cognition) confused;anxious;low arousal/lethargic  mildy agitated at times  -DK     Behavioral Issues (Cognition) overwhelmed easily;difficulty managing stress  -DK     Orientation Status (Cognition) oriented to;person  -DK     Follows Commands (Cognition) physical/tactile prompts required;repetition of directions required;verbal cues/prompting required  -DK     Cognitive Function (Cognition) attention deficit  -DK     Attention Deficit (Cognition) minimal deficit;moderate deficit;concentration;focused/sustained attention  -DK     Personal Safety Interventions gait belt;nonskid shoes/slippers when out of bed;supervised activity  -       Row Name 01/22/25 1435          Motor Skills    Motor Skills --  therapeutic exercises  -DK     Coordination WFL  -DK     Therapeutic Exercise hip;knee;ankle  -DK     Additional Documentation --  No transfers due to pt lethargy.  -       Row Name 01/22/25 1435          Hip (Therapeutic Exercise)    Hip (Therapeutic Exercise) AAROM (active assistive range of motion)  -     Hip AAROM (Therapeutic Exercise) bilateral;flexion;extension;aBduction;aDduction;supine;10 repetitions;2 sets  -       Row Name 01/22/25 1435          Knee (Therapeutic Exercise)    Knee (Therapeutic Exercise) AAROM (active assistive range of motion)  -     Knee AAROM (Therapeutic Exercise) bilateral;flexion;extension;supine;10 repetitions;2  sets  -DK       Row Name 01/22/25 1435          Ankle (Therapeutic Exercise)    Ankle (Therapeutic Exercise) AAROM (active assistive range of motion)  -DK     Ankle AAROM (Therapeutic Exercise) bilateral;dorsiflexion;plantarflexion;supine;10 repetitions;2 sets  -DK       Row Name             Wound 01/22/25 1211 tongue    Wound - Properties Group Placement Date: 01/22/25  -MD Placement Time: 1211 -MD Location: [facility]    Retiremanpreet Wound - Properties Group Placement Date: 01/22/25  -MD Placement Time: 1211 -MD Location: [facility]    Retired Wound - Properties Group Placement Date: 01/22/25  -MD Placement Time: 1211 -MD Location: [facility]    Retired Wound - Properties Group Date first assessed: 01/22/25 -MD Time first assessed: 1211 -MD Location: [facility]      Row Name 01/22/25 1435          Plan of Care Review    Plan of Care Reviewed With patient;family  -JOHNNY     Progress no change  -DK       Row Name 01/22/25 1435          Positioning and Restraints    Pre-Treatment Position in bed  -DK     Post Treatment Position bed  -DK     In Bed supine;call light within reach;encouraged to call for assist;exit alarm on;patient within staff view;with family/caregiver;side rails up x3;legs elevated;heels elevated  -DK               User Key  (r) = Recorded By, (t) = Taken By, (c) = Cosigned By      Initials Name Provider Type    Lia Canales PTA Physical Therapist Assistant    Valerie Ann, RN Registered Nurse                    Physical Therapy Education       Title: PT OT SLP Therapies (Done)       Topic: Physical Therapy (Done)       Point: Mobility training (Done)       Learning Progress Summary            Patient Acceptance, E,TB, VU by VINH at 1/19/2025 1316                      Point: Home exercise program (Done)       Learning Progress Summary            Patient Acceptance, E,TB, VU by DW at 1/19/2025 1316                      Point: Body mechanics (Done)       Learning Progress Summary             Patient Acceptance, E,TB, VU by VINH at 1/19/2025 1316                      Point: Precautions (Done)       Learning Progress Summary            Patient Acceptance, E,TB, VU by VINH at 1/19/2025 1316                                      User Key       Initials Effective Dates Name Provider Type Discipline    VINH 04/25/21 -  Junior Ambrocio, PT Physical Therapist PT                  PT Recommendation and Plan     Plan of Care Reviewed With: patient, family  Progress: no change   Outcome Measures       Row Name 01/20/25 1159             How much help from another person do you currently need...    Turning from your back to your side while in flat bed without using bedrails? 2  -DK      Moving from lying on back to sitting on the side of a flat bed without bedrails? 2  -DK      Moving to and from a bed to a chair (including a wheelchair)? 1  -DK      Standing up from a chair using your arms (e.g., wheelchair, bedside chair)? 1  -DK      Climbing 3-5 steps with a railing? 1  -DK      To walk in hospital room? 1  -DK      AM-PAC 6 Clicks Score (PT) 8  -DK         Functional Assessment    Outcome Measure Options AM-PAC 6 Clicks Basic Mobility (PT)  -DK                User Key  (r) = Recorded By, (t) = Taken By, (c) = Cosigned By      Initials Name Provider Type    Lia Canales PTA Physical Therapist Assistant                     Time Calculation:    PT Charges       Row Name 01/22/25 1437             Time Calculation    PT Received On 01/22/25  -DK      PT Goal Re-Cert Due Date 01/28/25  -DK         Timed Charges    21614 - PT Therapeutic Exercise Minutes 14  -DK      53333 - PT Therapeutic Activity Minutes 4  -DK         Total Minutes    Timed Charges Total Minutes 18  -DK       Total Minutes 18  -DK                User Key  (r) = Recorded By, (t) = Taken By, (c) = Cosigned By      Initials Name Provider Type    Lia Canales PTA Physical Therapist Assistant                  Therapy Charges for Today       Code  Description Service Date Service Provider Modifiers Qty    [id number] HC PT THER PROC EA 15 MIN 1/22/2025 Lia Escamilla, PTA GP 1            PT G-Codes  Outcome Measure Options: AM-PAC 6 Clicks Daily Activity (OT), Optimal Instrument  AM-PAC 6 Clicks Score (PT): 10  AM-PAC 6 Clicks Score (OT): 6    Lia Escamilla PTA  1/22/2025

## 2025-01-22 NOTE — PLAN OF CARE
Goal Outcome Evaluation:  Plan of Care Reviewed With: patient, family         ASSESSMENT/ PLAN OF CARE:  Pt presents with limitations, noted below, that impede his ability to follow safely and maintain nutrition. The skills of a therapist will be required to safely and effectively implement the following treatment plan to restore maximal level of function.     PROBLEMS:  1.  Decreased cognition for feeding, decreased oral bolus control, delayed swallow, risk of aspiration                       LTG 1: 30 days: Patient will tolerate least restrictive diet utilizing appropriate positioning and strategies with minimal assistance.                       STG 1a: 14 days: Patient will tolerate diet of puréed solids and nectar thickened liquids with minimal assistance for strategies.                       STG 1b: 14 days: Patient will tolerate 8 of 10 trials of thin liquids with min to no signs or symptoms of aspiration.                       STG 1c: 14 days: Patient with demonstrate adequate chewing and swallowing 8 of 10 trials of soft solids.                       STG 1d: 14 days: Patient/family education.                       TREATMENT: Dysphagia therapy to address swallow function through exercises and education of strategies.                FREQUENCY/DURATION: Twice daily 5 times per week     REHAB POTENTIAL:  Pt has fair to good rehab potential.  The following limitations may influence improvement/ length of tx: Medical status.     RECOMMENDATIONS:   1.   DIET: May have nectar thickened liquid when fully alert and asking for p.o. intake.    2.  POSITION: Positioning fully upright for all p.o. intake and 30 minutes following.     3.  COMPENSATORY STRATEGIES: One-on-one assist to feed self only when patient is fully awake, alert, and participating.  Small single sips of liquid.  May utilize controlled straw drink with nectar liquid.  Check swallow each bite/sip.       Anticipated Discharge Disposition (SLP): home with  24/7 care, skilled nursing facility

## 2025-01-22 NOTE — PROGRESS NOTES
Pulmonary / Critical Care Progress Note      Patient Name: Nelson Wang  : 1945  MRN: [id number]  Attending:  Tyler Smith DO   Date of admission: 2025    Subjective   Follow-up for respiratory failure,  Follow-up for resp failure, ESRD, delirium, toxic metabolic encephalopathy, CO2 narcosis, aspiration pneumonia, chronic compensated diastolic congestive heart failure.    Over last 24 hours,  Had dialysis.  Remained on Brovana, Pulmicort and DuoNeb.  Remained on tube feeding with Cortrak in place. Mentation fluctuating.     This morning, remains on 2 L nasal cannula.  Still somnolent, confused.   Cortrak in place  He is pulling on things.  Family at bedside.  Tube feeds at goal.         Objective   Objective     Vitals:   Vital signs for last 24 hours:  Temp:  [97.5 °F (36.4 °C)-99 °F (37.2 °C)] 97.5 °F (36.4 °C)  Heart Rate:  [79-88] 79  Resp:  [16-20] 18  BP: (108-161)/(52-87) 134/58    Intake/Output last 3 shifts:  I/O last 3 completed shifts:  In:  [Other:641; NG/GT:1444]  Out:      Vent settings for last 24 hours:       Physical Exam:  Vital Signs Reviewed   General: Frail critically ill-appearing male awake, pulling on things, in mild distress  HEENT:  PERRL, EOMI. MMM  Chest: diminished with crackles bilaterally, has mild increased work of breathing   CV: RRR, no MGR, pulses 2+, equal.  Abd:  Soft, NT, ND, + BS, no HSM  EXT:  no clubbing, no cyanosis, no edema  Neuro: Somnolent, arousable, has generalized weakness  Skin: No rashes or lesions noted      Result Review    Result Review:  I have personally reviewed the results from the time of this admission to 2025 08:23 EST and agree with these findings:  [x]  Laboratory  [x]  Microbiology  [x]  Radiology  [x]  EKG/Telemetry   [x]  Cardiology/Vascular   []  Pathology  []  Old records  []  Other:  Most notable findings include:       Lab 25  0529 25  0615 25  0642 25  0450 25  0449 25  0638  01/17/25  0349 01/17/25  0231   WBC 6.43  --  6.68 6.50 4.97 4.86  --  5.25   HEMOGLOBIN 8.7*  --  8.6* 8.7* 9.9* 9.4*  --  9.4*   HEMATOCRIT 28.4*  --  28.5* 28.6* 32.1* 32.9*  --  32.5*   PLATELETS 154  --  153 145 133* 141  --  159   SODIUM 132* 130* 129* 131* 129*  --  131* 133*   POTASSIUM 3.4* 3.2* 3.3* 3.4* 4.0  --  4.0 4.0   CHLORIDE 95* 93* 93* 93* 93*  --  96* 97*   CO2 30.1* 21.5* 21.0* 25.4 25.7  --  22.1 26.0   BUN 45* 76* 59* 35* 18  --  17 15   CREATININE 3.56* 6.11* 5.04* 4.02* 2.89*  --  3.91* 4.00*   GLUCOSE 148* 242* 235* 202* 210*  --  98 93   CALCIUM 8.8 9.1 9.1 9.0 8.9  --  9.4 9.6   PHOSPHORUS 1.4* 2.0* 2.0* 2.5 2.2*  --  4.6* 5.2*   TOTAL PROTEIN  --   --   --   --   --   --   --  6.5   ALBUMIN  --   --   --   --  2.6*  --  2.6* 2.5*   GLOBULIN  --   --   --   --   --   --   --  4.0     CT Head Without Contrast Stroke Protocol    Result Date: 1/12/2025  CT HEAD WO CONTRAST STROKE PROTOCOL Date of Exam: 1/12/2025 10:31 AM EST Indication: stroke r/o. Comparison: CT head 12/30/2024 Technique: Axial CT images were obtained of the head without contrast administration.  Reconstructed coronal and sagittal images were also obtained. Automated exposure control and iterative construction methods were used. Scan Time: 10:43 a.m. Results discussed with Nathaniel at 10:56 a.m.. Findings: There is no evidence of acute infarction. There there is no acute intracranial hemorrhage. There are no extra-axial collections. Ventricles and CSF spaces are symmetric. No mass effect nor hydrocephalus. There are periventricular white matter changes and cerebral atrophy which appears age-related.  There is fluid opacification of the right mastoid air cells. Paranasal sinuses appear aerated.  Osseous structures and orbits appear intact. There is some soft tissue prominence along the right posterior calvarium which may represent a small hematoma.     Impression: Impression: Stable examination without acute intracranial  process. Electronically Signed: Margaret Hammond MD  1/12/2025 10:57 AM EST  Workstation ID: [id number]     Results for orders placed during the hospital encounter of 01/11/25    Adult Transthoracic Echo Limited W/ Cont if Necessary Per Protocol    Interpretation Summary    Left ventricular ejection fraction appears to be 56 - 60%.  No obvious wall motion abnormalities.    Some type of catheter appears in the right atrium.    This is a limited echo for ejection fraction.      EEG with features consistent with encephalopathy    Cortisol 1.30    Assessment & Plan   Assessment / Plan     Active Hospital Problems:  Active Hospital Problems    Diagnosis     **ESRD (end stage renal disease) on dialysis     Moderate protein-calorie malnutrition      Impression:  Altered mental status  Acute hypoxemic and hypercapnic respiratory failure require mechanical ventilation  Toxic/metabolic encephalopathy  CO2 narcosis  Toxic/metabolic encephalopathy possible hypoactive delirium  Hypotension  NSTEMI, likely from demand ischemia/type II  End-stage renal disease on hemodialysis  Chronic compensated diastolic congestive heart failure  Possible sepsis  Stroke rule out  Type 2 diabetes, now with hypoglycemia  Discitis status post course of vancomycin  Atrial fibrillation  Small chronic right pleural effusion  Femur fracture relative adrenal insufficiency        Plan:  -Continue supplemental O2.  Titrate to maintain SpO2 90% or greater.  -Discontinue hydrocortisone.   -Continue Synthroid  -Echocardiogram with EF 56 to 60% no evidence of wall motion abnormalities.   -Antibiotics stopped.  Suspect hypotension was secondary to adrenal insufficiency  -Dialysis per nephrology.  Appreciate assistance  -EEG earlier this hospitalization consistent with encephalopathy  -Cortrak in place.  Tube feeds at goal per dietitian recommendations  -SSI to optimize glucose control    GI prophylaxis with Protonix    DNR/DNI now.  Prognosis very poor    VTE  Prophylaxis:  Mechanical VTE prophylaxis orders are present.    CODE STATUS:   Medical Intervention Limits: No intubation (DNI)  Code Status (Patient has no pulse and is not breathing): No CPR (Do Not Attempt to Resuscitate)  Medical Interventions (Patient has pulse or is breathing): Limited Support    I have reviewed labs, imaging, pertinent clinical data and provider notes.   I have discussed with bedside nurse and primary service.     Electronically signed by Preston Jimenez MD, 01/22/25, 8:23 AM EST.

## 2025-01-22 NOTE — THERAPY EVALUATION
Acute Care - Speech Language Pathology   Swallow Initial Evaluation  [place]     Patient Name: Nelson Wang  : 1945  MRN: [id number]  Today's Date: 2025  Onset of Illness/Injury or Date of Surgery: 25     Referring Physician: Devante Rosado      Admit Date: 2025    Visit Dx:     ICD-10-CM ICD-9-CM   1. Chronic respiratory failure with hypoxia  J96.11 518.83     799.02   2. Recurrent pneumonia  J18.9 486   3. Acute on chronic respiratory failure with hypoxia  J96.21 518.84     799.02   4. ESRD (end stage renal disease)  N18.6 585.6   5. Aspiration pneumonia, unspecified aspiration pneumonia type, unspecified laterality, unspecified part of lung  J69.0 507.0   6. Difficulty in walking  R26.2 719.7   7. Oropharyngeal dysphagia  R13.12 787.22     Patient Active Problem List   Diagnosis    Essential hypertension    Bradycardia, sinus    Anemia due to chronic kidney disease    Hypothyroidism    Idiopathic gout    Proteinuria    Psoriasis    Thrombocytopenia    Type 2 diabetes mellitus without complication    CKD (chronic kidney disease), stage IV    Hyperlipidemia    Monoclonal gammopathy of unknown significance (MGUS)    Stage 5 chronic kidney disease    Chronic gout without tophus    Peritoneal dialysis catheter dysfunction    Medicare annual wellness visit, subsequent    Chronic cough    Peritonitis associated with peritoneal dialysis    Recurrent pneumonia    Acute on chronic respiratory failure with hypoxia    ESRD (end stage renal disease)    Aspiration pneumonitis    Sepsis    Type 2 diabetes mellitus, with long-term current use of insulin    GERD without esophagitis    Immunosuppression due to drug therapy    Bipolar disorder    Chronic respiratory failure with hypoxia    Shortness of breath    Abnormal stress test    ESRD on dialysis    Abnormal chest CT    Encounter for screening for malignant neoplasm of colon    History of colon polyps    Family history of colon cancer    Intractable  pain    Abdominal pain    ESRD (end stage renal disease) on dialysis    AMS (altered mental status)    Hallucinations    LUQ pain    Discitis    Metabolic encephalopathy    Aspiration pneumonia    Discitis of lumbar region    Severe malnutrition    Dementia    Unspecified severe protein-calorie malnutrition    Hypoxia    Gram-positive bacteremia    Moderate protein-calorie malnutrition     Past Medical History:   Diagnosis Date    Abnormal stress test     Anemia     IRON INFUSIONS WITH DIALYSIS    Anesthesia     WITH HIP REPLACEMENT DAUGHTER BELIEVES HAD SVT 2000    Ankle pain, right     Anxiety and depression     Arthritis     CKD (chronic kidney disease), stage IV     DIALYSIS EZZX-UDMAT-UWPOHCCA SHILEY IN RIGHT CHEST    Diabetes     GERD (gastroesophageal reflux disease)     Gout     High cholesterol     History of peritoneal dialysis     HL (hearing loss)     Hyperkalemia     Hypertension     Hypothyroidism     Insomnia     Night terrors     Psoriasis     Psoriasis     Sleep apnea     Sleep walking     Thrombocytopenia     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    Vitiligo      Past Surgical History:   Procedure Laterality Date    ABDOMINAL SURGERY      ANKLE SURGERY Right 11/1990    APPENDECTOMY N/A 1954    ARTERIOVENOUS FISTULA/SHUNT SURGERY Left 07/16/2024    Procedure: Creation of left arm arteriovenous fistula;  Surgeon: Moses Mir MD;  Location: [facility] MAIN OR;  Service: Vascular;  Laterality: Left;    BRONCHOSCOPY N/A 03/01/2024    Procedure: BRONCHOSCOPY WITH BAL AND WASHINGS;  Surgeon: Sandra Espinal MD;  Location: [facility] ENDOSCOPY;  Service: Pulmonary;  Laterality: N/A;  PNEUMONIA    BRONCHOSCOPY N/A 08/30/2024    Procedure: BRONCHOSCOPY WITH BALS AND WASHINGS;  Surgeon: Sandra Espinal MD;  Location: [facility] ENDOSCOPY;  Service: Pulmonary;  Laterality: N/A;  MUCOUS PLUGGING    CARDIAC CATHETERIZATION N/A 05/29/2024    Procedure: Left Heart Cath with possible coronary angioplasty;  Surgeon: Magdalena  Stephan Lizama MD;  Location: [facility] CATH INVASIVE LOCATION;  Service: Cardiology;  Laterality: N/A;    COLONOSCOPY N/A 06/2022    [facility]    ENDOSCOPY N/A 10/28/2024    Procedure: ESOPHAGOGASTRODUODENOSCOPY INSERTION OF LIGHTED INSTRUMENT TO VIEW ESOPHAGUS, STOMACH AND SMALL INTESTINE;  Surgeon: Kingsley Peraza MD;  Location: [facility] ENDOSCOPY;  Service: Gastroenterology;  Laterality: N/A;  Gastritis    FRACTURE SURGERY      HIP BIPOLAR REPLACEMENT Right 01/2000    INSERTION HEMODIALYSIS CATHETER Left 04/09/2024    Procedure: HEMODIALYSIS CATHETER INSERTION;  Surgeon: Jose Berry MD;  Location: [facility] OR;  Service: General;  Laterality: Left;    INSERTION HEMODIALYSIS CATHETER N/A 04/12/2024    Procedure: Tunneled hemodialysis catheter insertion;  Surgeon: Enrique Vinson MD;  Location: [facility] OR;  Service: Vascular;  Laterality: N/A;    INSERTION PERITONEAL DIALYSIS CATHETER N/A 03/27/2023    Procedure: LAPAROSCOPIC INSERTION PERITONEAL DIALYSIS CATHETER, LAPAROSCOPIC OMENTOPEXY WITH LYSIS OF ADHESIONS;  Surgeon: Jose Berry MD;  Location: [facility] OR;  Service: General;  Laterality: N/A;    INSERTION PERITONEAL DIALYSIS CATHETER Left 07/23/2023    Procedure: REVISION OF PERITONEAL DIALYSIS CATHETER;  Surgeon: Radha Oreilly MD;  Location: [facility] OR;  Service: General;  Laterality: Left;    JOINT REPLACEMENT      REMOVAL PERITONEAL DIALYSIS CATHETER N/A 04/09/2024    Procedure: REMOVAL PERITONEAL DIALYSIS CATHETER;  Surgeon: Jose Berry MD;  Location: [facility] OR;  Service: General;  Laterality: N/A;    RENAL BIOPSY Left 07/15/2022    UPPER GASTROINTESTINAL ENDOSCOPY      VASCULAR SURGERY      WRIST SURGERY      UNSURE WHICH SIDE DAUGHTER REPORTS HAD SEVERE  CUT FROM WINDOW AND THEY HAD TO DO RECONSTRUCTIVE SURGERY WITH VESSELS AND NERVES       Inpatient Speech Pathology Dysphagia Evaluation           PAIN SCALE: None indicated.      PRECAUTIONS/CONTRAINDICATIONS: Standard     SUSPECTED ABUSE/NEGLECT/EXPLOITATION: None indicated.     SOCIAL/PSYCHOLOGICAL NEEDS/BARRIERS: None indicated.     PAST SOCIAL HISTORY:  79-year-old male lives at home with family     PRIOR FUNCTION: On mechanical ground diet with thin liquid.       PATIENT GOALS/EXPECTATIONS: Continue eating orally     HISTORY: 79-year-old male with the above diagnosis referred for speech therapy evaluation to assess for swallowing.  Currently with core track.  Patient has been seen by speech pathology services during recent hospitalization.    CURRENT DIET LEVEL: N.p.o. with core track     OBJECTIVE:    TEST ADMINISTERED: Clinical dysphagia evaluation     COGNITION/SAFETY AWARENESS: Impaired     BEHAVIORAL OBSERVATIONS: Patient initially alert, asking for coffee, did not maintain.  Patient requiring maximum cueing to maintain alertness     ORAL MOTOR EXAM: Patient followed minimal commands for oral motor examination.  Mild general decreased oral motor strength/range of motion 3+ 5.     VOICE QUALITY: Adequate     REFLEX EXAM: Deferred     POSTURE: Assisted sitting upright in bed     FEEDING/SWALLOWING FUNCTION: Assessed with nectar liquid, thin liquids.     CLINICAL OBSERVATIONS: Nectar liquid by straw with delayed initiation of swallow, swallow delay, vocal quality remaining clear to cervical auscultation.  Patient demonstrated sequential swallow with vocal quality remaining clear.  Thin liquid by straw with delayed swallow, patient demonstrated sequential swallow with slight vocal change, patient cleared.  Patient did not maintain alertness for further trials.    DYSPHAGIA CRITERIA: Decreased cognition for feeding, decreased oral bolus control, swallow delay, risk of aspiration.     FUNCTIONAL ASSESSMENT INSTRUMENT: Patient currently scored a level 3 of 7 on Functional Communication Measures for swallowing indicating a 60-79% limitation in function.     ASSESSMENT/ PLAN OF CARE:  Pt  presents with limitations, noted below, that impede his ability to follow safely and maintain nutrition. The skills of a therapist will be required to safely and effectively implement the following treatment plan to restore maximal level of function.     PROBLEMS:  1.  Decreased cognition for feeding, decreased oral bolus control, delayed swallow, risk of aspiration                       LTG 1: 30 days: Patient will tolerate least restrictive diet utilizing appropriate positioning and strategies with minimal assistance.                       STG 1a: 14 days: Patient will tolerate diet of puréed solids and nectar thickened liquids with minimal assistance for strategies.                       STG 1b: 14 days: Patient will tolerate 8 of 10 trials of thin liquids with min to no signs or symptoms of aspiration.                       STG 1c: 14 days: Patient with demonstrate adequate chewing and swallowing 8 of 10 trials of soft solids.                       STG 1d: 14 days: Patient/family education.                       TREATMENT: Dysphagia therapy to address swallow function through exercises and education of strategies.                FREQUENCY/DURATION: Twice daily 5 times per week     REHAB POTENTIAL:  Pt has fair to good rehab potential.  The following limitations may influence improvement/ length of tx: Medical status.     RECOMMENDATIONS:   1.   DIET: May have nectar thickened liquid when fully alert and asking for p.o. intake.    2.  POSITION: Positioning fully upright for all p.o. intake and 30 minutes following.     3.  COMPENSATORY STRATEGIES: One-on-one assist to feed self only when patient is fully awake, alert, and participating.  Small single sips of liquid.  May utilize controlled straw drink with nectar liquid.  Check swallow each bite/sip.     Pt/responsible party agrees with plan of care and has been informed of all alternatives, risks and benefits.                            Anticipated Discharge  Disposition (SLP): home with 24/7 care, skilled nursing facility (01/22/25 1050)                                                               EDUCATION  The patient has been educated in the following areas:   Modified Diet Instruction.                Time Calculation:    Time Calculation- SLP       Row Name 01/22/25 1050             Time Calculation- SLP    SLP Start Time 0945  -TB      SLP Stop Time 1045  -TB      SLP Time Calculation (min) 60 min  -TB      SLP Received On 01/22/25  -TB         Untimed Charges    SLP Eval/Re-eval  ST Eval Oral Pharyng Swallow - 73929  -TB      21053-NA Eval Oral Pharyng Swallow Minutes 60  -TB         Total Minutes    Untimed Charges Total Minutes 60  -TB       Total Minutes 60  -TB                User Key  (r) = Recorded By, (t) = Taken By, (c) = Cosigned By      Initials Name Provider Type    TB Katie Isaacs SLP Speech and Language Pathologist                    Therapy Charges for Today       Code Description Service Date Service Provider Modifiers Qty    [id number] HC ST EVAL ORAL PHARYNG SWALLOW 4 1/22/2025 Katie Isaacs SLP GN 1                 ADILSON Álvarez  1/22/2025

## 2025-01-22 NOTE — PROGRESS NOTES
LOS: 11 days   Patient Care Team:  Domingo Bravo MD as PCP - General (Internal Medicine)  Josephine Warren APRN as Nurse Practitioner (Pulmonary Disease)  Virginie Lopez APRN as Nurse Practitioner (Pulmonary Disease)    Chief Complaint:  Renal issues    Subjective   No new events.  Alert but not answering me.  Did not follow commands.   Mildly restless.  No distress, sitter and family at bedside.    acetaminophen, 650 mg, Nasogastric, Q6H  arformoterol, 15 mcg, Nebulization, BID - RT  artificial tears, , Both Eyes, 4x Daily  atorvastatin, 40 mg, Nasogastric, Daily  budesonide, 0.5 mg, Nebulization, BID - RT  epoetin trevor/trevor-epbx, 30,000 Units, Subcutaneous, Weekly  insulin lispro, 2-7 Units, Subcutaneous, Q6H  levothyroxine, 175 mcg, Nasogastric, Q AM  ofloxacin, 2 drop, Left Eye, 4x Daily  pantoprazole, 40 mg, Intravenous, Q AM  QUEtiapine, 25 mg, Nasogastric, Nightly  senna-docusate sodium, 2 tablet, Nasogastric, BID  sodium chloride, 10 mL, Intravenous, Q12H  sodium chloride, 10 mL, Intravenous, Q12H           Objective     Vital Signs  Temp:  [97.5 °F (36.4 °C)-99 °F (37.2 °C)] 98.4 °F (36.9 °C)  Heart Rate:  [79-88] 83  Resp:  [16-20] 16  BP: (123-161)/(52-87) 136/54    Intake/Output Summary (Last 24 hours) at 1/22/2025 1530  Last data filed at 1/22/2025 1458  Gross per 24 hour   Intake 1124 ml   Output --   Net 1124 ml             Physical Exam:     General Appearance:    Poorly responsive,  in no acute distress, orally intubated, on vent..   Head:    Normocephalic, without obvious abnormality, atraumatic, core track in place.   Throat:    Oral mucosa appear dry   Neck:   No adenopathy, supple, no JVD       Lungs:   Decreased with upper rhonchi bilaterally.  respirations unlabored, no wheezes.    Heart:    Regular rate and rhythm , normal S1 and S2, no            murmur, no gallop, no rub   Abdomen:     Normal bowel sounds, no masses, soft non-tender   Extremities:    No lower extremity edema, no  "cyanosis, L arm fistula is patent.       Skin:   Dry, No rash.  Skin discoloration from psoriasis noted.                  Results Review:    Results from last 7 days   Lab Units 01/22/25  0529 01/21/25  0615 01/20/25  0642   SODIUM mmol/L 132* 130* 129*   POTASSIUM mmol/L 3.4* 3.2* 3.3*   CHLORIDE mmol/L 95* 93* 93*   CO2 mmol/L 30.1* 21.5* 21.0*   BUN mg/dL 45* 76* 59*   CREATININE mg/dL 3.56* 6.11* 5.04*   GLUCOSE mg/dL 148* 242* 235*   CALCIUM mg/dL 8.8 9.1 9.1     Results from last 7 days   Lab Units 01/22/25  0529 01/20/25  0642 01/19/25  0450   WBC 10*3/mm3 6.43 6.68 6.50   HEMOGLOBIN g/dL 8.7* 8.6* 8.7*   HEMATOCRIT % 28.4* 28.5* 28.6*   PLATELETS 10*3/mm3 154 153 145     Magnesium   Date Value Ref Range Status   01/22/2025 1.9 1.6 - 2.4 mg/dL Final   01/21/2025 2.2 1.6 - 2.4 mg/dL Final   01/20/2025 2.1 1.6 - 2.4 mg/dL Final     Phosphorus   Date Value Ref Range Status   01/22/2025 1.4 (C) 2.5 - 4.5 mg/dL Final   01/21/2025 2.0 (L) 2.5 - 4.5 mg/dL Final   01/20/2025 2.0 (L) 2.5 - 4.5 mg/dL Final     No results found for: \"BNP\"  Lab Results   Component Value Date    ALBUMIN 2.6 (L) 01/18/2025      Brief Urine Lab Results  (Last result in the past 365 days)        Color   Clarity   Blood   Leuk Est   Nitrite   Protein   CREAT   Urine HCG        10/27/24 1417 Yellow   Cloudy   Negative   Trace   Negative   >=300 mg/dL (3+)                    No radiology results from the last 24 hrs         Assessment & Plan       ESRD (end stage renal disease) on dialysis    Moderate protein-calorie malnutrition    - ESRD, was on home dialysis prior to admission, has left upper extremity AV fistula for access.  Electrolytes are stable.  Dialysis m/w/f previously.  Plan dialysis tomorrow then Saturday before going back to [facility].        - Type 2 diabetes with complications.  Per primary     - Hypotension blood pressure is better now.  UF as tolerated.    - Anemia of chronic kidney disease, goal hemoglobin above 10.  Start Epogen " 30,000 unit subcu every 7 days.     - Altered mentation.  Delirium, was getting slowly better, but no intubated on the vent.    - R femur FX- ortho seeing.    - Adrenal insufficiency, on steroids, dose reduced per pulmonary.    - Hypophosphatemia and mild hypokalemia, will give potassium phosphate and recheck in AM.    Discussed with family.      Please call if any questions  [phone]

## 2025-01-22 NOTE — PLAN OF CARE
Goal Outcome Evaluation:  Plan of Care Reviewed With: patient   Pt alert to self. Speech cleared pt to have nectar thickened liquids when alert. Safety sitter remains at bedside. Continuing with plan of care.

## 2025-01-23 LAB
ANION GAP SERPL CALCULATED.3IONS-SCNC: 11.4 MMOL/L (ref 5–15)
BUN SERPL-MCNC: 63 MG/DL (ref 8–23)
BUN/CREAT SERPL: 15.8 (ref 7–25)
CALCIUM SPEC-SCNC: 8.7 MG/DL (ref 8.6–10.5)
CHLORIDE SERPL-SCNC: 92 MMOL/L (ref 98–107)
CO2 SERPL-SCNC: 27.6 MMOL/L (ref 22–29)
CREAT SERPL-MCNC: 3.98 MG/DL (ref 0.76–1.27)
EGFRCR SERPLBLD CKD-EPI 2021: 14.7 ML/MIN/1.73
GLUCOSE BLDC GLUCOMTR-MCNC: 139 MG/DL (ref 70–99)
GLUCOSE BLDC GLUCOMTR-MCNC: 156 MG/DL (ref 70–99)
GLUCOSE BLDC GLUCOMTR-MCNC: 160 MG/DL (ref 70–99)
GLUCOSE BLDC GLUCOMTR-MCNC: 195 MG/DL (ref 70–99)
GLUCOSE BLDC GLUCOMTR-MCNC: 212 MG/DL (ref 70–99)
GLUCOSE SERPL-MCNC: 207 MG/DL (ref 65–99)
MAGNESIUM SERPL-MCNC: 1.8 MG/DL (ref 1.6–2.4)
PHOSPHATE SERPL-MCNC: 2.4 MG/DL (ref 2.5–4.5)
POTASSIUM SERPL-SCNC: 3.6 MMOL/L (ref 3.5–5.2)
SODIUM SERPL-SCNC: 131 MMOL/L (ref 136–145)
WHOLE BLOOD HOLD SPECIMEN: NORMAL

## 2025-01-23 PROCEDURE — 83735 ASSAY OF MAGNESIUM: CPT | Performed by: INTERNAL MEDICINE

## 2025-01-23 PROCEDURE — 82948 REAGENT STRIP/BLOOD GLUCOSE: CPT

## 2025-01-23 PROCEDURE — 99232 SBSQ HOSP IP/OBS MODERATE 35: CPT | Performed by: INTERNAL MEDICINE

## 2025-01-23 PROCEDURE — 99233 SBSQ HOSP IP/OBS HIGH 50: CPT | Performed by: INTERNAL MEDICINE

## 2025-01-23 PROCEDURE — 94799 UNLISTED PULMONARY SVC/PX: CPT

## 2025-01-23 PROCEDURE — 63710000001 INSULIN LISPRO (HUMAN) PER 5 UNITS: Performed by: INTERNAL MEDICINE

## 2025-01-23 PROCEDURE — 84100 ASSAY OF PHOSPHORUS: CPT | Performed by: INTERNAL MEDICINE

## 2025-01-23 PROCEDURE — 94668 MNPJ CHEST WALL SBSQ: CPT

## 2025-01-23 PROCEDURE — 80048 BASIC METABOLIC PNL TOTAL CA: CPT | Performed by: INTERNAL MEDICINE

## 2025-01-23 RX ORDER — QUETIAPINE FUMARATE 25 MG/1
12.5 TABLET, FILM COATED ORAL NIGHTLY PRN
Status: DISCONTINUED | OUTPATIENT
Start: 2025-01-23 | End: 2025-01-24

## 2025-01-23 RX ADMIN — LEVOTHYROXINE SODIUM 175 MCG: 75 TABLET ORAL at 05:56

## 2025-01-23 RX ADMIN — OFLOXACIN 2 DROP: 3 SOLUTION OPHTHALMIC at 07:30

## 2025-01-23 RX ADMIN — INSULIN LISPRO 2 UNITS: 100 INJECTION, SOLUTION INTRAVENOUS; SUBCUTANEOUS at 00:11

## 2025-01-23 RX ADMIN — QUETIAPINE FUMARATE 12.5 MG: 25 TABLET ORAL at 20:40

## 2025-01-23 RX ADMIN — ACETAMINOPHEN 650 MG: 160 SOLUTION ORAL at 02:28

## 2025-01-23 RX ADMIN — INSULIN LISPRO 2 UNITS: 100 INJECTION, SOLUTION INTRAVENOUS; SUBCUTANEOUS at 12:32

## 2025-01-23 RX ADMIN — QUETIAPINE FUMARATE 12.5 MG: 25 TABLET ORAL at 01:20

## 2025-01-23 RX ADMIN — ATORVASTATIN CALCIUM 40 MG: 40 TABLET, FILM COATED ORAL at 12:21

## 2025-01-23 RX ADMIN — ACETAMINOPHEN 650 MG: 160 SOLUTION ORAL at 15:00

## 2025-01-23 RX ADMIN — WHITE PETROLATUM 57.7 %-MINERAL OIL 31.9 % EYE OINTMENT: at 12:21

## 2025-01-23 RX ADMIN — ARFORMOTEROL TARTRATE 15 MCG: 15 SOLUTION RESPIRATORY (INHALATION) at 20:54

## 2025-01-23 RX ADMIN — OFLOXACIN 2 DROP: 3 SOLUTION OPHTHALMIC at 20:40

## 2025-01-23 RX ADMIN — BUDESONIDE 0.5 MG: 0.5 INHALANT ORAL at 20:54

## 2025-01-23 RX ADMIN — Medication 10 ML: at 20:39

## 2025-01-23 RX ADMIN — PANTOPRAZOLE SODIUM 40 MG: 40 INJECTION, POWDER, FOR SOLUTION INTRAVENOUS at 05:56

## 2025-01-23 RX ADMIN — Medication 10 ML: at 07:30

## 2025-01-23 RX ADMIN — INSULIN LISPRO 3 UNITS: 100 INJECTION, SOLUTION INTRAVENOUS; SUBCUTANEOUS at 06:09

## 2025-01-23 RX ADMIN — ACETAMINOPHEN 650 MG: 160 SOLUTION ORAL at 20:40

## 2025-01-23 RX ADMIN — OFLOXACIN 2 DROP: 3 SOLUTION OPHTHALMIC at 12:21

## 2025-01-23 RX ADMIN — WHITE PETROLATUM 57.7 %-MINERAL OIL 31.9 % EYE OINTMENT: at 07:30

## 2025-01-23 RX ADMIN — OFLOXACIN 2 DROP: 3 SOLUTION OPHTHALMIC at 17:07

## 2025-01-23 RX ADMIN — Medication 10 ML: at 20:41

## 2025-01-23 RX ADMIN — GUAIFENESIN 200 MG: 100 LIQUID ORAL at 22:56

## 2025-01-23 RX ADMIN — WHITE PETROLATUM 57.7 %-MINERAL OIL 31.9 % EYE OINTMENT: at 17:07

## 2025-01-23 RX ADMIN — WHITE PETROLATUM 57.7 %-MINERAL OIL 31.9 % EYE OINTMENT: at 20:41

## 2025-01-23 NOTE — PROGRESS NOTES
[facility]   Hospitalist Progress Note  Date: 2025  Patient Name: Nelson Wang  : 1945  MRN: [id number]  Date of admission: 2025      Subjective   Subjective     Chief Complaint: Follow up for home dialysis unit malfunction    Summary:  79 y.o. ESRD on dialysis, type 2 diabetes, dementia, discitis having completed vancomycin, A-fib, restrictive lung disease.  Patient recently discharged following long hospitalization, admitted from 2024 through 2025.  Patient discharged home with in-home dialysis.  Unfortunately today patient's dialysis machine not working.  Patient's primary nephrologist was contacted, unable to get patient into other outpatient dialysis facilities.  Therefore, decision was made to direct admit patient into the hospital for dialysis as he has not had dialysis for 3 days.  Hospitalist service contacted for admission orders, nephrology consulted for dialysis needs.     Interval Followup:   Per report had another good night of sleep  Vital signs stable  Seen on dialysis.  More alert and conversant.  Requesting coffee  1.5 L fluid removal goal    Objective   Objective     Vitals:   Temp:  [97.3 °F (36.3 °C)-98.4 °F (36.9 °C)] 97.5 °F (36.4 °C)  Heart Rate:  [76-86] 79  Resp:  [16-18] 16  BP: (131-148)/(53-60) 131/59  Physical Exam    Constitutional: Chronically ill appearing male, NAD   Respiratory:  nonlabored respirations    Cardiovascular:  RRR   Gastrointestinal: soft   Neurologic: Alert, oriented to person, following simple commands   Skin: Extremities warm    Result Review    Result Review:  I have personally reviewed the following over the last 24 hours (07:00 to 07:00) and agree with the following findings  [x]  Laboratory  CBC          2025    04:50 2025    06:42 2025    05:29   CBC   WBC 6.50  6.68  6.43    RBC 3.23  3.18  3.19    Hemoglobin 8.7  8.6  8.7    Hematocrit 28.6  28.5  28.4    MCV 88.5  89.6  89.0    MCH 26.9  27.0  27.3    MCHC  30.4  30.2  30.6    RDW 19.0  19.2  19.2    Platelets 145  153  154      CMP          1/21/2025    06:15 1/22/2025    05:29 1/23/2025    05:56   CMP   Glucose 242  148  207    BUN 76  45  63    Creatinine 6.11  3.56  3.98    EGFR 8.8  16.8  14.7    Sodium 130  132  131    Potassium 3.2  3.4  3.6    Chloride 93  95  92    Calcium 9.1  8.8  8.7    BUN/Creatinine Ratio 12.4  12.6  15.8    Anion Gap 15.5  6.9  11.4      []  Microbiology  []  Radiology   []  EKG/Telemetry   []  Cardiology/Vascular   []  Pathology  []  Old records  [x]  Other:    Intake/Output Summary (Last 24 hours) at 1/23/2025 0806  Last data filed at 1/23/2025 0515  Gross per 24 hour   Intake 1104 ml   Output 0 ml   Net 1104 ml         Assessment & Plan   Assessment / Plan     Assessment/Plan:  Acute hypoxemic hypercapnic respiratory failure requiring mechanical ventilation  Toxic/metabolic encephalopathy   Hospital-acquired delirium  End-stage renal disease on dialysis  NSTEMI suspect type II from demand ischemia  Hypotension  Hypokalemia  Phosphatemia  Adrenal insufficiency significantly low random cortisol level  Type 2 diabetes  Acute to subacute nondisplaced periprosthetic fracture involving proximal right femur, managing nonoperatively  Atrial fibrillation not on anticoagulation due to falls  Restrictive lung disease   Dementia         Pulmonology following, appreciate assistance  Extubated 1/18.  On room air.  Continue scheduled nebulizers. Supplemental oxygen prn maintain SpO2 above 90%  Sleep and mentation slowly improving.  Continue nightly Seroquel. (Qtc on 1/22 465).  Avoid sedating medications otherwise  Weaned off stress dose steroids.  Blood pressure remains stable  Continue Synthroid  Blood sugars controlled.  Continue low-dose SSI every 6 hours  Timing of HD per nephrology  Continue electrolyte supplementation per orders  Nonoperative management of fracture per Ortho. Continue scheduled Tylenol  SLP following, advanced to puréed  liquid dietContinue aspiration precautions, one-to-one assist with feeding.    Keep Cortrak until p.o. intake improves, tolerating tube feeds , dietitian following  PT/OT following  CBC, BMP, magnesium and phosphorus level in a.m.  Guarded long-term prognosis.     GI ppx: Daily PPI  VTE ppx: SCDs    Discussed plan with RN.    VTE Prophylaxis:  Mechanical VTE prophylaxis orders are present.    CODE STATUS:   Medical Intervention Limits: No intubation (DNI)  Code Status (Patient has no pulse and is not breathing): No CPR (Do Not Attempt to Resuscitate)  Medical Interventions (Patient has pulse or is breathing): Limited Support    Electronically signed by Tyler Smith DO, 01/23/25, 9:53 AM EST.

## 2025-01-23 NOTE — SIGNIFICANT NOTE
01/23/25 0943   Physical Therapy Time and Intention   Session Not Performed patient unavailable for treatment   Comment, Session Not Performed Pt is out of the room for dialysis.

## 2025-01-23 NOTE — PLAN OF CARE
Goal Outcome Evaluation:              Outcome Evaluation: Patient more alert and conversive this shift and able to follow direction. Patient on tube feedings at this time and is at full strength. Alert to self and no c/o pain or discomfort at this time. Will continue to monitor and follow POC.

## 2025-01-23 NOTE — PROGRESS NOTES
Pulmonary / Critical Care Progress Note      Patient Name: Nelson Wang  : 1945  MRN: [id number]  Attending:  Tyler Smith DO   Date of admission: 2025    Subjective   Follow-up for respiratory failure,  Follow-up for resp failure, ESRD, delirium, toxic metabolic encephalopathy, CO2 narcosis, aspiration pneumonia, chronic compensated diastolic congestive heart failure.    Over last 24 hours,  Had dialysis.  Remained on Brovana, Pulmicort and DuoNeb.  Remained on tube feeding with Cortrak in place. Mentation improving some.     This morning,  Getting dialysis.  Mentation improved.  Does not know why he is in hospital, however has been more conversant.  Cortrak in place  Per family report, was able to eat ice cream and applesauce yesterday.  Tube feeds at goal.         Objective   Objective     Vitals:   Vital signs for last 24 hours:  Temp:  [97.3 °F (36.3 °C)-98.4 °F (36.9 °C)] 97.5 °F (36.4 °C)  Heart Rate:  [76-86] 79  Resp:  [16-18] 16  BP: (131-148)/(53-60) 131/59    Intake/Output last 3 shifts:  I/O last 3 completed shifts:  In: 1833 [Other:441; NG/GT:1392]  Out: 0     Vent settings for last 24 hours:       Physical Exam:  Vital Signs Reviewed   General: Frail critically ill-appearing male awake  HEENT:  PERRL, EOMI. MMM  Chest: diminished with crackles bilaterally, has mild increased work of breathing   CV: RRR, no MGR, pulses 2+, equal.  Abd:  Soft, NT, ND, + BS, no HSM  EXT:  no clubbing, no cyanosis, no edema  Neuro: Cranial nerves II to XII are intact, has generalized weakness  Skin: No rashes or lesions noted      Result Review    Result Review:  I have personally reviewed the results from the time of this admission to 2025 08:01 EST and agree with these findings:  [x]  Laboratory  [x]  Microbiology  [x]  Radiology  [x]  EKG/Telemetry   [x]  Cardiology/Vascular   []  Pathology  []  Old records  []  Other:  Most notable findings include:       Lab 25  0556 25  0529  01/21/25  0615 01/20/25  0642 01/19/25  0450 01/18/25  0449 01/17/25  0638 01/17/25  0349 01/17/25  0231   WBC  --  6.43  --  6.68 6.50 4.97 4.86  --  5.25   HEMOGLOBIN  --  8.7*  --  8.6* 8.7* 9.9* 9.4*  --  9.4*   HEMATOCRIT  --  28.4*  --  28.5* 28.6* 32.1* 32.9*  --  32.5*   PLATELETS  --  154  --  153 145 133* 141  --  159   SODIUM 131* 132* 130* 129* 131* 129*  --  131* 133*   POTASSIUM 3.6 3.4* 3.2* 3.3* 3.4* 4.0  --  4.0 4.0   CHLORIDE 92* 95* 93* 93* 93* 93*  --  96* 97*   CO2 27.6 30.1* 21.5* 21.0* 25.4 25.7  --  22.1 26.0   BUN 63* 45* 76* 59* 35* 18  --  17 15   CREATININE 3.98* 3.56* 6.11* 5.04* 4.02* 2.89*  --  3.91* 4.00*   GLUCOSE 207* 148* 242* 235* 202* 210*  --  98 93   CALCIUM 8.7 8.8 9.1 9.1 9.0 8.9  --  9.4 9.6   PHOSPHORUS 2.4* 1.4* 2.0* 2.0* 2.5 2.2*  --  4.6* 5.2*   TOTAL PROTEIN  --   --   --   --   --   --   --   --  6.5   ALBUMIN  --   --   --   --   --  2.6*  --  2.6* 2.5*   GLOBULIN  --   --   --   --   --   --   --   --  4.0     CT Head Without Contrast Stroke Protocol    Result Date: 1/12/2025  CT HEAD WO CONTRAST STROKE PROTOCOL Date of Exam: 1/12/2025 10:31 AM EST Indication: stroke r/o. Comparison: CT head 12/30/2024 Technique: Axial CT images were obtained of the head without contrast administration.  Reconstructed coronal and sagittal images were also obtained. Automated exposure control and iterative construction methods were used. Scan Time: 10:43 a.m. Results discussed with Nathaniel at 10:56 a.m.. Findings: There is no evidence of acute infarction. There there is no acute intracranial hemorrhage. There are no extra-axial collections. Ventricles and CSF spaces are symmetric. No mass effect nor hydrocephalus. There are periventricular white matter changes and cerebral atrophy which appears age-related.  There is fluid opacification of the right mastoid air cells. Paranasal sinuses appear aerated.  Osseous structures and orbits appear intact. There is some soft tissue prominence  along the right posterior calvarium which may represent a small hematoma.     Impression: Impression: Stable examination without acute intracranial process. Electronically Signed: Margaret Hammond MD  1/12/2025 10:57 AM EST  Workstation ID: [id number]     Results for orders placed during the hospital encounter of 01/11/25    Adult Transthoracic Echo Limited W/ Cont if Necessary Per Protocol    Interpretation Summary    Left ventricular ejection fraction appears to be 56 - 60%.  No obvious wall motion abnormalities.    Some type of catheter appears in the right atrium.    This is a limited echo for ejection fraction.      EEG with features consistent with encephalopathy    Cortisol 1.30    Assessment & Plan   Assessment / Plan     Active Hospital Problems:  Active Hospital Problems    Diagnosis     **ESRD (end stage renal disease) on dialysis     Moderate protein-calorie malnutrition      Impression:  Altered mental status  Acute hypoxemic and hypercapnic respiratory failure require mechanical ventilation  Toxic/metabolic encephalopathy  CO2 narcosis  Toxic/metabolic encephalopathy possible hypoactive delirium  Hypotension  NSTEMI, likely from demand ischemia/type II  End-stage renal disease on hemodialysis  Chronic compensated diastolic congestive heart failure  Possible sepsis  Stroke rule out  Type 2 diabetes, now with hypoglycemia  Discitis status post course of vancomycin  Atrial fibrillation  Small chronic right pleural effusion  Femur fracture relative adrenal insufficiency        Plan:  -Continue supplemental O2.  Titrate to maintain SpO2 90% or greater.  -Continue to steroids.  -Continue Synthroid  -Echocardiogram with EF 56 to 60% no evidence of wall motion abnormalities.   -Antibiotics stopped.  Suspect hypotension was secondary to adrenal insufficiency  -Dialysis per nephrology.  Appreciate assistance  -EEG earlier this hospitalization consistent with encephalopathy  -Cortrak in place.  Tube feeds at goal  per dietitian recommendations.  Given his recurrent aspiration, would recommend PEG tube placement if aggressive measures are to be continued.  -SSI to optimize glucose control    GI prophylaxis with Protonix    DNR/DNI now.  Prognosis very poor    VTE Prophylaxis:  Mechanical VTE prophylaxis orders are present.    CODE STATUS:   Medical Intervention Limits: No intubation (DNI)  Code Status (Patient has no pulse and is not breathing): No CPR (Do Not Attempt to Resuscitate)  Medical Interventions (Patient has pulse or is breathing): Limited Support    I have reviewed labs, imaging, pertinent clinical data and provider notes.   I have discussed with bedside nurse and primary service.     Electronically signed by Preston Jimenez MD, 01/23/25, 8:01 AM EST.

## 2025-01-23 NOTE — SIGNIFICANT NOTE
[facility]   Wound Evaluation / Progress Note       Patient Name: Nelson Wang    : 1945    MRN: [id number]  Today's Date: 2025      Onset of Illness/Injury or Date of Surgery: 25      Referring Physician: Devante Rosado       Admit Date: 2025    Visit Dx:    ICD-10-CM ICD-9-CM   1. Chronic respiratory failure with hypoxia  J96.11 518.83     799.02   2. Recurrent pneumonia  J18.9 486   3. Acute on chronic respiratory failure with hypoxia  J96.21 518.84     799.02   4. ESRD (end stage renal disease)  N18.6 585.6   5. Aspiration pneumonia, unspecified aspiration pneumonia type, unspecified laterality, unspecified part of lung  J69.0 507.0   6. Difficulty in walking  R26.2 719.7   7. Oropharyngeal dysphagia  R13.12 787.22       Patient Active Problem List   Diagnosis    Essential hypertension    Bradycardia, sinus    Anemia due to chronic kidney disease    Hypothyroidism    Idiopathic gout    Proteinuria    Psoriasis    Thrombocytopenia    Type 2 diabetes mellitus without complication    CKD (chronic kidney disease), stage IV    Hyperlipidemia    Monoclonal gammopathy of unknown significance (MGUS)    Stage 5 chronic kidney disease    Chronic gout without tophus    Peritoneal dialysis catheter dysfunction    Medicare annual wellness visit, subsequent    Chronic cough    Peritonitis associated with peritoneal dialysis    Recurrent pneumonia    Acute on chronic respiratory failure with hypoxia    ESRD (end stage renal disease)    Aspiration pneumonitis    Sepsis    Type 2 diabetes mellitus, with long-term current use of insulin    GERD without esophagitis    Immunosuppression due to drug therapy    Bipolar disorder    Chronic respiratory failure with hypoxia    Shortness of breath    Abnormal stress test    ESRD on dialysis    Abnormal chest CT    Encounter for screening for malignant neoplasm of colon    History of colon polyps    Family history of colon cancer    Intractable pain     Abdominal pain    ESRD (end stage renal disease) on dialysis    AMS (altered mental status)    Hallucinations    LUQ pain    Discitis    Metabolic encephalopathy    Aspiration pneumonia    Discitis of lumbar region    Severe malnutrition    Dementia    Unspecified severe protein-calorie malnutrition    Hypoxia    Gram-positive bacteremia    Moderate protein-calorie malnutrition        Past Medical History:   Diagnosis Date    Abnormal stress test     Anemia     IRON INFUSIONS WITH DIALYSIS    Anesthesia     WITH HIP REPLACEMENT DAUGHTER BELIEVES HAD SVT 2000    Ankle pain, right     Anxiety and depression     Arthritis     CKD (chronic kidney disease), stage IV     DIALYSIS [id number] SHILEY IN RIGHT CHEST    Diabetes     GERD (gastroesophageal reflux disease)     Gout     High cholesterol     History of peritoneal dialysis     HL (hearing loss)     Hyperkalemia     Hypertension     Hypothyroidism     Insomnia     Night terrors     Psoriasis     Psoriasis     Sleep apnea     Sleep walking     Thrombocytopenia     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    Vitiligo         Past Surgical History:   Procedure Laterality Date    ABDOMINAL SURGERY      ANKLE SURGERY Right 11/1990    APPENDECTOMY N/A 1954    ARTERIOVENOUS FISTULA/SHUNT SURGERY Left 07/16/2024    Procedure: Creation of left arm arteriovenous fistula;  Surgeon: Moses Mir MD;  Location: [facility] MAIN OR;  Service: Vascular;  Laterality: Left;    BRONCHOSCOPY N/A 03/01/2024    Procedure: BRONCHOSCOPY WITH BAL AND WASHINGS;  Surgeon: Sandra Espinal MD;  Location: [facility] ENDOSCOPY;  Service: Pulmonary;  Laterality: N/A;  PNEUMONIA    BRONCHOSCOPY N/A 08/30/2024    Procedure: BRONCHOSCOPY WITH BALS AND WASHINGS;  Surgeon: Sandra Espinal MD;  Location: [facility] ENDOSCOPY;  Service: Pulmonary;  Laterality: N/A;  MUCOUS PLUGGING    CARDIAC CATHETERIZATION N/A 05/29/2024    Procedure: Left Heart Cath with possible coronary angioplasty;  Surgeon: Stephan Nichols  MD Dl;  Location: [facility] CATH INVASIVE LOCATION;  Service: Cardiology;  Laterality: N/A;    COLONOSCOPY N/A 06/2022    [facility]    ENDOSCOPY N/A 10/28/2024    Procedure: ESOPHAGOGASTRODUODENOSCOPY INSERTION OF LIGHTED INSTRUMENT TO VIEW ESOPHAGUS, STOMACH AND SMALL INTESTINE;  Surgeon: Kingsley Peraza MD;  Location: [facility] ENDOSCOPY;  Service: Gastroenterology;  Laterality: N/A;  Gastritis    FRACTURE SURGERY      HIP BIPOLAR REPLACEMENT Right 01/2000    INSERTION HEMODIALYSIS CATHETER Left 04/09/2024    Procedure: HEMODIALYSIS CATHETER INSERTION;  Surgeon: Jose Berry MD;  Location: [facility] OR;  Service: General;  Laterality: Left;    INSERTION HEMODIALYSIS CATHETER N/A 04/12/2024    Procedure: Tunneled hemodialysis catheter insertion;  Surgeon: Enrique Vinson MD;  Location: [facility] MAIN OR;  Service: Vascular;  Laterality: N/A;    INSERTION PERITONEAL DIALYSIS CATHETER N/A 03/27/2023    Procedure: LAPAROSCOPIC INSERTION PERITONEAL DIALYSIS CATHETER, LAPAROSCOPIC OMENTOPEXY WITH LYSIS OF ADHESIONS;  Surgeon: Jose Berry MD;  Location: [facility] OR;  Service: General;  Laterality: N/A;    INSERTION PERITONEAL DIALYSIS CATHETER Left 07/23/2023    Procedure: REVISION OF PERITONEAL DIALYSIS CATHETER;  Surgeon: Radha Oreilly MD;  Location: [facility] MAIN OR;  Service: General;  Laterality: Left;    JOINT REPLACEMENT      REMOVAL PERITONEAL DIALYSIS CATHETER N/A 04/09/2024    Procedure: REMOVAL PERITONEAL DIALYSIS CATHETER;  Surgeon: Jose Berry MD;  Location: [facility] MAIN OR;  Service: General;  Laterality: N/A;    RENAL BIOPSY Left 07/15/2022    UPPER GASTROINTESTINAL ENDOSCOPY      VASCULAR SURGERY      WRIST SURGERY      UNSURE WHICH SIDE DAUGHTER REPORTS HAD SEVERE  CUT FROM WINDOW AND THEY HAD TO DO RECONSTRUCTIVE SURGERY WITH VESSELS AND NERVES         Physical Assessment:  Wound 01/22/25 1211 tongue (Active)   Wound Image   01/23/25 1400   Base  red;moist 01/23/25 1400   Drainage Amount none 01/23/25 1400   Care, Wound cleansed with;sterile normal saline 01/23/25 1400   Dressing Care open to air;other (see comments) 01/23/25 1400       Wound 01/23/25 1425 Right medial perineum (Active)   Wound Image   01/23/25 1400   Pressure Injury Stage 2 01/23/25 1400   Dressing Appearance open to air 01/23/25 1400   Base red;moist 01/23/25 1400   Red (%), Wound Tissue Color 100 01/23/25 1400   Periwound intact;pink 01/23/25 1400   Periwound Temperature warm 01/23/25 1400   Periwound Skin Turgor soft 01/23/25 1400   Edges open 01/23/25 1400   Wound Length (cm) 2.2 cm 01/23/25 1400   Wound Width (cm) 0.8 cm 01/23/25 1400   Wound Depth (cm) 0.1 cm 01/23/25 1400   Wound Surface Area (cm^2) 1.76 cm^2 01/23/25 1400   Wound Volume (cm^3) 0.176 cm^3 01/23/25 1400   Drainage Characteristics/Odor serosanguineous 01/23/25 1400   Drainage Amount scant 01/23/25 1400   Care, Wound cleansed with;sterile normal saline 01/23/25 1400   Dressing Care open to air;skin barrier agent applied 01/23/25 1400   Periwound Care moisturizer applied 01/23/25 1400        Wound Check / Follow-up:  Seen today on 3NT for wound RN trigger for wound to tongue. Upon assessment found with sitter at bedside and attempting to pull out CorTrak tube and redirected. Tongue tissue appears intact with no lesions noted with small amount of yellow debris on tongue. Recommend good Q2H oral hygiene while NPO with tube feedings.    Stage II pressure injury noted to right gluteal distal to rectum. Recommend implementing a turn and reposition every 2 hour schedule; keep skin dry and free of moisture/incontinence, and float heels at all time while in bed; as well as, apply thin layer of orange top cream TID/incontinent episodes. Due to current location of wound it would be almost impossible to apply any type of dressing.    Impression: Stage II pressure injury 2nd to moisture and friction    Short term goals:  Regain skin  integirty; pressure reduction, moisture prevention    Yunier Vuong RN,[facility]    1/23/2025    14:29 EST

## 2025-01-23 NOTE — PROGRESS NOTES
LOS: 12 days   Patient Care Team:  Domingo Bravo MD as PCP - General (Internal Medicine)  Josephine Warren APRN as Nurse Practitioner (Pulmonary Disease)  Virginie Lpoez APRN as Nurse Practitioner (Pulmonary Disease)    Chief Complaint:  Renal issues    Subjective   Pt seen on dialysis  More awake and answering questions  Asking if [place] are [place] train [place].  Sitter at bedside.    acetaminophen, 650 mg, Nasogastric, Q6H  arformoterol, 15 mcg, Nebulization, BID - RT  artificial tears, , Both Eyes, 4x Daily  atorvastatin, 40 mg, Nasogastric, Daily  budesonide, 0.5 mg, Nebulization, BID - RT  epoetin trevor/trevor-epbx, 30,000 Units, Subcutaneous, Weekly  insulin lispro, 2-7 Units, Subcutaneous, Q6H  levothyroxine, 175 mcg, Nasogastric, Q AM  ofloxacin, 2 drop, Left Eye, 4x Daily  pantoprazole, 40 mg, Intravenous, Q AM  QUEtiapine, 12.5 mg, Nasogastric, Nightly  senna-docusate sodium, 2 tablet, Nasogastric, BID  sodium chloride, 10 mL, Intravenous, Q12H  sodium chloride, 10 mL, Intravenous, Q12H           Objective     Vital Signs  Temp:  [97.3 °F (36.3 °C)-98.4 °F (36.9 °C)] 97.5 °F (36.4 °C)  Heart Rate:  [75-86] 75  Resp:  [16-18] 16  BP: (121-148)/(53-63) 130/54    Intake/Output Summary (Last 24 hours) at 1/23/2025 1030  Last data filed at 1/23/2025 0515  Gross per 24 hour   Intake 1104 ml   Output 0 ml   Net 1104 ml             Physical Exam:     General Appearance:    Awake and alert, in no acute distress.   Head:    Normocephalic, without obvious abnormality, atraumatic, NG in place.   Throat:    Oral mucosa appear dry   Neck:   No adenopathy, supple, no JVD       Lungs:   Decreased with upper rhonchi bilaterally.  respirations unlabored, no wheezes.    Heart:    Regular rate and rhythm , normal S1 and S2, no            murmur, no gallop, no rub   Abdomen:     Normal bowel sounds, no masses, soft non-tender   Extremities:    No lower extremity edema, no cyanosis, L arm fistula is patent.       Skin:   Dry, No  "rash.  Skin discoloration from psoriasis noted.                  Results Review:    Results from last 7 days   Lab Units 01/23/25  0556 01/22/25  0529 01/21/25  0615   SODIUM mmol/L 131* 132* 130*   POTASSIUM mmol/L 3.6 3.4* 3.2*   CHLORIDE mmol/L 92* 95* 93*   CO2 mmol/L 27.6 30.1* 21.5*   BUN mg/dL 63* 45* 76*   CREATININE mg/dL 3.98* 3.56* 6.11*   GLUCOSE mg/dL 207* 148* 242*   CALCIUM mg/dL 8.7 8.8 9.1     Results from last 7 days   Lab Units 01/22/25  0529 01/20/25  0642 01/19/25  0450   WBC 10*3/mm3 6.43 6.68 6.50   HEMOGLOBIN g/dL 8.7* 8.6* 8.7*   HEMATOCRIT % 28.4* 28.5* 28.6*   PLATELETS 10*3/mm3 154 153 145     Magnesium   Date Value Ref Range Status   01/23/2025 1.8 1.6 - 2.4 mg/dL Final   01/22/2025 1.9 1.6 - 2.4 mg/dL Final   01/21/2025 2.2 1.6 - 2.4 mg/dL Final     Phosphorus   Date Value Ref Range Status   01/23/2025 2.4 (L) 2.5 - 4.5 mg/dL Final   01/22/2025 1.4 (C) 2.5 - 4.5 mg/dL Final   01/21/2025 2.0 (L) 2.5 - 4.5 mg/dL Final     No results found for: \"BNP\"  Lab Results   Component Value Date    ALBUMIN 2.6 (L) 01/18/2025      Brief Urine Lab Results  (Last result in the past 365 days)        Color   Clarity   Blood   Leuk Est   Nitrite   Protein   CREAT   Urine HCG        10/27/24 1417 Yellow   Cloudy   Negative   Trace   Negative   >=300 mg/dL (3+)                    No radiology results from the last 24 hrs         Assessment & Plan       ESRD (end stage renal disease) on dialysis    Moderate protein-calorie malnutrition    - ESRD, was on home dialysis prior to admission, has left upper extremity AV fistula for access.  Electrolytes are stable.  Dialysis m/w/f previously.  Plan dialysis Saturday before going back to [facility].        - Type 2 diabetes with complications.  Per primary     - Hypotension blood pressure is better now.  UF as tolerated.    - Anemia of chronic kidney disease, goal hemoglobin above 10.  Start Epogen 30,000 unit subcu every 7 days.     - Altered mentation.  Delirium, was " getting slowly better.    - R femur FX- ortho seeing.    - Adrenal insufficiency, on steroids, dose reduced per pulmonary.    - Hypophosphatemia and mild hypokalemia, phos better at 2.4.  monitor.      Please call if any questions  [phone]

## 2025-01-23 NOTE — NURSING NOTE
Duration of Treatment 3.0 Hours   Access Site AVF   Dialyzer Revaclear    mL/min   Dialysate Temperature (C) 36   BFR-As tolerated to a maximum of: 400 mL/min   Prime Dialyzer With NS to Priming Volume? Yes   Dialysate Solution Bath: K+ = 4 mEq, Ca = 2.5mEq   Bicarb 30 mEq   Na+ Other   Na+ comments 139   Fluid Removal: 1.5L     Pt completed entire tx with no issues. Sitter at bedside redirecting. Removed 1.5L. Post /58.     Report given to Ann Marie MARADIAGA.

## 2025-01-23 NOTE — PLAN OF CARE
Goal Outcome Evaluation:  Plan of Care Reviewed With: patient        Progress: no change  Outcome Evaluation: VSS. Patient alert to self. Patient tolerates tube feeds at goal rate. Patient had dialysis and 1.5L drained today. Family and safety sitter at bedside throughout shift. No other concerns or complaints. Continue plan of care.

## 2025-01-24 ENCOUNTER — APPOINTMENT (OUTPATIENT)
Dept: GENERAL RADIOLOGY | Facility: HOSPITAL | Age: 80
End: 2025-01-24
Payer: MEDICARE

## 2025-01-24 LAB
ANION GAP SERPL CALCULATED.3IONS-SCNC: 8.2 MMOL/L (ref 5–15)
BUN SERPL-MCNC: 40 MG/DL (ref 8–23)
BUN/CREAT SERPL: 13.6 (ref 7–25)
CALCIUM SPEC-SCNC: 8.8 MG/DL (ref 8.6–10.5)
CHLORIDE SERPL-SCNC: 99 MMOL/L (ref 98–107)
CO2 SERPL-SCNC: 23.8 MMOL/L (ref 22–29)
CREAT SERPL-MCNC: 2.95 MG/DL (ref 0.76–1.27)
DEPRECATED RDW RBC AUTO: 69.9 FL (ref 37–54)
EGFRCR SERPLBLD CKD-EPI 2021: 21 ML/MIN/1.73
ERYTHROCYTE [DISTWIDTH] IN BLOOD BY AUTOMATED COUNT: 20.3 % (ref 12.3–15.4)
GLUCOSE BLDC GLUCOMTR-MCNC: 124 MG/DL (ref 70–99)
GLUCOSE BLDC GLUCOMTR-MCNC: 165 MG/DL (ref 70–99)
GLUCOSE BLDC GLUCOMTR-MCNC: 174 MG/DL (ref 70–99)
GLUCOSE BLDC GLUCOMTR-MCNC: 222 MG/DL (ref 70–99)
GLUCOSE SERPL-MCNC: 174 MG/DL (ref 65–99)
HCT VFR BLD AUTO: 34.1 % (ref 37.5–51)
HGB BLD-MCNC: 9.8 G/DL (ref 13–17.7)
MAGNESIUM SERPL-MCNC: 1.7 MG/DL (ref 1.6–2.4)
MCH RBC QN AUTO: 27 PG (ref 26.6–33)
MCHC RBC AUTO-ENTMCNC: 28.7 G/DL (ref 31.5–35.7)
MCV RBC AUTO: 93.9 FL (ref 79–97)
PHOSPHATE SERPL-MCNC: 1.7 MG/DL (ref 2.5–4.5)
PLATELET # BLD AUTO: 196 10*3/MM3 (ref 140–450)
PMV BLD AUTO: 11.2 FL (ref 6–12)
POTASSIUM SERPL-SCNC: 4.5 MMOL/L (ref 3.5–5.2)
RBC # BLD AUTO: 3.63 10*6/MM3 (ref 4.14–5.8)
SODIUM SERPL-SCNC: 131 MMOL/L (ref 136–145)
WBC NRBC COR # BLD AUTO: 7.65 10*3/MM3 (ref 3.4–10.8)

## 2025-01-24 PROCEDURE — 82948 REAGENT STRIP/BLOOD GLUCOSE: CPT

## 2025-01-24 PROCEDURE — 63710000001 INSULIN LISPRO (HUMAN) PER 5 UNITS: Performed by: INTERNAL MEDICINE

## 2025-01-24 PROCEDURE — 85027 COMPLETE CBC AUTOMATED: CPT | Performed by: INTERNAL MEDICINE

## 2025-01-24 PROCEDURE — 94761 N-INVAS EAR/PLS OXIMETRY MLT: CPT

## 2025-01-24 PROCEDURE — 25010000003 DEXTROSE 5 % SOLUTION: Performed by: INTERNAL MEDICINE

## 2025-01-24 PROCEDURE — 99233 SBSQ HOSP IP/OBS HIGH 50: CPT | Performed by: INTERNAL MEDICINE

## 2025-01-24 PROCEDURE — 92526 ORAL FUNCTION THERAPY: CPT

## 2025-01-24 PROCEDURE — 94799 UNLISTED PULMONARY SVC/PX: CPT

## 2025-01-24 PROCEDURE — 80048 BASIC METABOLIC PNL TOTAL CA: CPT | Performed by: INTERNAL MEDICINE

## 2025-01-24 PROCEDURE — 94668 MNPJ CHEST WALL SBSQ: CPT

## 2025-01-24 PROCEDURE — 83735 ASSAY OF MAGNESIUM: CPT | Performed by: INTERNAL MEDICINE

## 2025-01-24 PROCEDURE — 73552 X-RAY EXAM OF FEMUR 2/>: CPT

## 2025-01-24 PROCEDURE — 99232 SBSQ HOSP IP/OBS MODERATE 35: CPT | Performed by: INTERNAL MEDICINE

## 2025-01-24 PROCEDURE — 84100 ASSAY OF PHOSPHORUS: CPT | Performed by: INTERNAL MEDICINE

## 2025-01-24 RX ORDER — CALCIUM CARBONATE 500 MG/1
1 TABLET, CHEWABLE ORAL 2 TIMES DAILY PRN
Status: DISCONTINUED | OUTPATIENT
Start: 2025-01-24 | End: 2025-01-25 | Stop reason: HOSPADM

## 2025-01-24 RX ORDER — GUAIFENESIN 200 MG/10ML
200 LIQUID ORAL EVERY 4 HOURS PRN
Status: DISCONTINUED | OUTPATIENT
Start: 2025-01-24 | End: 2025-01-25 | Stop reason: HOSPADM

## 2025-01-24 RX ORDER — BISACODYL 10 MG
10 SUPPOSITORY, RECTAL RECTAL DAILY PRN
Status: DISCONTINUED | OUTPATIENT
Start: 2025-01-24 | End: 2025-01-25 | Stop reason: HOSPADM

## 2025-01-24 RX ORDER — QUETIAPINE FUMARATE 25 MG/1
12.5 TABLET, FILM COATED ORAL NIGHTLY
Status: DISCONTINUED | OUTPATIENT
Start: 2025-01-24 | End: 2025-01-25 | Stop reason: HOSPADM

## 2025-01-24 RX ORDER — ACETAMINOPHEN 160 MG/5ML
650 SOLUTION ORAL EVERY 6 HOURS
Status: DISCONTINUED | OUTPATIENT
Start: 2025-01-24 | End: 2025-01-25 | Stop reason: HOSPADM

## 2025-01-24 RX ORDER — AMOXICILLIN 250 MG
2 CAPSULE ORAL 2 TIMES DAILY
Status: DISCONTINUED | OUTPATIENT
Start: 2025-01-24 | End: 2025-01-25 | Stop reason: HOSPADM

## 2025-01-24 RX ORDER — HYDROCORTISONE 10 MG/1
10 TABLET ORAL 2 TIMES DAILY WITH MEALS
Status: DISCONTINUED | OUTPATIENT
Start: 2025-01-24 | End: 2025-01-25 | Stop reason: HOSPADM

## 2025-01-24 RX ORDER — QUETIAPINE FUMARATE 25 MG/1
12.5 TABLET, FILM COATED ORAL NIGHTLY PRN
Status: DISCONTINUED | OUTPATIENT
Start: 2025-01-24 | End: 2025-01-25 | Stop reason: HOSPADM

## 2025-01-24 RX ORDER — AMOXICILLIN 250 MG
2 CAPSULE ORAL 2 TIMES DAILY PRN
Status: DISCONTINUED | OUTPATIENT
Start: 2025-01-24 | End: 2025-01-25 | Stop reason: HOSPADM

## 2025-01-24 RX ORDER — MIDODRINE HYDROCHLORIDE 5 MG/1
5 TABLET ORAL DAILY PRN
Status: DISCONTINUED | OUTPATIENT
Start: 2025-01-24 | End: 2025-01-25 | Stop reason: HOSPADM

## 2025-01-24 RX ORDER — ATORVASTATIN CALCIUM 40 MG/1
40 TABLET, FILM COATED ORAL NIGHTLY
Status: DISCONTINUED | OUTPATIENT
Start: 2025-01-24 | End: 2025-01-25 | Stop reason: HOSPADM

## 2025-01-24 RX ORDER — POLYETHYLENE GLYCOL 3350 17 G/17G
17 POWDER, FOR SOLUTION ORAL DAILY PRN
Status: DISCONTINUED | OUTPATIENT
Start: 2025-01-24 | End: 2025-01-25 | Stop reason: HOSPADM

## 2025-01-24 RX ADMIN — Medication 10 ML: at 08:00

## 2025-01-24 RX ADMIN — INSULIN LISPRO 2 UNITS: 100 INJECTION, SOLUTION INTRAVENOUS; SUBCUTANEOUS at 05:39

## 2025-01-24 RX ADMIN — INSULIN LISPRO 3 UNITS: 100 INJECTION, SOLUTION INTRAVENOUS; SUBCUTANEOUS at 11:33

## 2025-01-24 RX ADMIN — WHITE PETROLATUM 57.7 %-MINERAL OIL 31.9 % EYE OINTMENT: at 07:59

## 2025-01-24 RX ADMIN — OFLOXACIN 2 DROP: 3 SOLUTION OPHTHALMIC at 07:59

## 2025-01-24 RX ADMIN — WHITE PETROLATUM 57.7 %-MINERAL OIL 31.9 % EYE OINTMENT: at 11:33

## 2025-01-24 RX ADMIN — SODIUM PHOSPHATE, MONOBASIC, MONOHYDRATE AND SODIUM PHOSPHATE, DIBASIC, ANHYDROUS 15 MMOL: 142; 276 INJECTION, SOLUTION INTRAVENOUS at 09:35

## 2025-01-24 RX ADMIN — LEVOTHYROXINE SODIUM 175 MCG: 75 TABLET ORAL at 05:03

## 2025-01-24 RX ADMIN — QUETIAPINE FUMARATE 12.5 MG: 25 TABLET ORAL at 20:18

## 2025-01-24 RX ADMIN — ARFORMOTEROL TARTRATE 15 MCG: 15 SOLUTION RESPIRATORY (INHALATION) at 19:37

## 2025-01-24 RX ADMIN — OFLOXACIN 2 DROP: 3 SOLUTION OPHTHALMIC at 20:19

## 2025-01-24 RX ADMIN — GUAIFENESIN 200 MG: 100 LIQUID ORAL at 04:16

## 2025-01-24 RX ADMIN — ATORVASTATIN CALCIUM 40 MG: 40 TABLET, FILM COATED ORAL at 20:18

## 2025-01-24 RX ADMIN — Medication 10 ML: at 20:20

## 2025-01-24 RX ADMIN — QUETIAPINE FUMARATE 12.5 MG: 25 TABLET ORAL at 00:16

## 2025-01-24 RX ADMIN — HYDROCORTISONE 10 MG: 10 TABLET ORAL at 17:01

## 2025-01-24 RX ADMIN — WHITE PETROLATUM 57.7 %-MINERAL OIL 31.9 % EYE OINTMENT: at 17:01

## 2025-01-24 RX ADMIN — ACETAMINOPHEN 650 MG: 160 SOLUTION ORAL at 04:16

## 2025-01-24 RX ADMIN — OFLOXACIN 2 DROP: 3 SOLUTION OPHTHALMIC at 17:01

## 2025-01-24 RX ADMIN — ACETAMINOPHEN 650 MG: 160 SOLUTION ORAL at 08:00

## 2025-01-24 RX ADMIN — PANTOPRAZOLE SODIUM 40 MG: 40 INJECTION, POWDER, FOR SOLUTION INTRAVENOUS at 05:03

## 2025-01-24 RX ADMIN — INSULIN LISPRO 2 UNITS: 100 INJECTION, SOLUTION INTRAVENOUS; SUBCUTANEOUS at 00:16

## 2025-01-24 RX ADMIN — WHITE PETROLATUM 57.7 %-MINERAL OIL 31.9 % EYE OINTMENT: at 20:19

## 2025-01-24 RX ADMIN — SENNOSIDES AND DOCUSATE SODIUM 2 TABLET: 50; 8.6 TABLET ORAL at 08:00

## 2025-01-24 RX ADMIN — ACETAMINOPHEN 650 MG: 160 SOLUTION ORAL at 20:18

## 2025-01-24 RX ADMIN — OFLOXACIN 2 DROP: 3 SOLUTION OPHTHALMIC at 11:33

## 2025-01-24 RX ADMIN — ATORVASTATIN CALCIUM 40 MG: 40 TABLET, FILM COATED ORAL at 08:00

## 2025-01-24 RX ADMIN — BUDESONIDE 0.5 MG: 0.5 INHALANT ORAL at 19:38

## 2025-01-24 RX ADMIN — Medication 10 ML: at 20:18

## 2025-01-24 NOTE — PROGRESS NOTES
Pulmonary / Critical Care Progress Note      Patient Name: Nelson Wang  : 1945  MRN: [id number]  Attending:  Tyler Smith DO   Date of admission: 2025    Subjective   Follow-up for respiratory failure,  Follow-up for resp failure, ESRD, delirium, toxic metabolic encephalopathy, CO2 narcosis, aspiration pneumonia, chronic compensated diastolic congestive heart failure.    Over last 24 hours,  Remained on Brovana, Pulmicort and DuoNeb.  Remained on tube feeding with Cortrak in place. Mentation improving some.     Had 3 to 4 hours of sleep    This morning,  Getting out of bed to chair.  Does not know why he is in hospital, however has been more conversant.  Cortrak in place  Per family report, was able to have some chicken soup.  Tube feeds at goal.         Objective   Objective     Vitals:   Vital signs for last 24 hours:  Temp:  [97.3 °F (36.3 °C)-97.9 °F (36.6 °C)] 97.3 °F (36.3 °C)  Heart Rate:  [75-87] 84  Resp:  [16] 16  BP: (119-166)/(49-63) 143/50    Intake/Output last 3 shifts:  I/O last 3 completed shifts:  In: 2534 [P.O.:650; Other:810; NG/GT:1074]  Out: 1500     Vent settings for last 24 hours:       Physical Exam:  Vital Signs Reviewed   General: Frail critically ill-appearing male awake  HEENT:  PERRL, EOMI. MMM  Chest: diminished with crackles bilaterally, has mild increased work of breathing   CV: RRR, no MGR, pulses 2+, equal.  Abd:  Soft, NT, ND, + BS, no HSM  EXT:  no clubbing, no cyanosis, no edema  Neuro: Cranial nerves II to XII are intact, has generalized weakness, mentation improving some, oriented to place and person  Skin: No rashes or lesions noted      Result Review    Result Review:  I have personally reviewed the results from the time of this admission to 2025 07:11 EST and agree with these findings:  [x]  Laboratory  [x]  Microbiology  [x]  Radiology  [x]  EKG/Telemetry   [x]  Cardiology/Vascular   []  Pathology  []  Old records  []  Other:  Most notable findings  include:       Lab 01/24/25  0548 01/23/25  0556 01/22/25  0529 01/21/25  0615 01/20/25  0642 01/19/25  0450 01/18/25  0449   WBC 7.65  --  6.43  --  6.68 6.50 4.97   HEMOGLOBIN 9.8*  --  8.7*  --  8.6* 8.7* 9.9*   HEMATOCRIT 34.1*  --  28.4*  --  28.5* 28.6* 32.1*   PLATELETS 196  --  154  --  153 145 133*   SODIUM 131* 131* 132* 130* 129* 131* 129*   POTASSIUM 4.5 3.6 3.4* 3.2* 3.3* 3.4* 4.0   CHLORIDE 99 92* 95* 93* 93* 93* 93*   CO2 23.8 27.6 30.1* 21.5* 21.0* 25.4 25.7   BUN 40* 63* 45* 76* 59* 35* 18   CREATININE 2.95* 3.98* 3.56* 6.11* 5.04* 4.02* 2.89*   GLUCOSE 174* 207* 148* 242* 235* 202* 210*   CALCIUM 8.8 8.7 8.8 9.1 9.1 9.0 8.9   PHOSPHORUS 1.7* 2.4* 1.4* 2.0* 2.0* 2.5 2.2*   ALBUMIN  --   --   --   --   --   --  2.6*     CT Head Without Contrast Stroke Protocol    Result Date: 1/12/2025  CT HEAD WO CONTRAST STROKE PROTOCOL Date of Exam: 1/12/2025 10:31 AM EST Indication: stroke r/o. Comparison: CT head 12/30/2024 Technique: Axial CT images were obtained of the head without contrast administration.  Reconstructed coronal and sagittal images were also obtained. Automated exposure control and iterative construction methods were used. Scan Time: 10:43 a.m. Results discussed with Nathaniel at 10:56 a.m.. Findings: There is no evidence of acute infarction. There there is no acute intracranial hemorrhage. There are no extra-axial collections. Ventricles and CSF spaces are symmetric. No mass effect nor hydrocephalus. There are periventricular white matter changes and cerebral atrophy which appears age-related.  There is fluid opacification of the right mastoid air cells. Paranasal sinuses appear aerated.  Osseous structures and orbits appear intact. There is some soft tissue prominence along the right posterior calvarium which may represent a small hematoma.     Impression: Impression: Stable examination without acute intracranial process. Electronically Signed: Margaret Hammond MD  1/12/2025 10:57 AM EST   Workstation ID: [id number]     Results for orders placed during the hospital encounter of 01/11/25    Adult Transthoracic Echo Limited W/ Cont if Necessary Per Protocol    Interpretation Summary    Left ventricular ejection fraction appears to be 56 - 60%.  No obvious wall motion abnormalities.    Some type of catheter appears in the right atrium.    This is a limited echo for ejection fraction.      EEG with features consistent with encephalopathy    Cortisol 1.30    Assessment & Plan   Assessment / Plan     Active Hospital Problems:  Active Hospital Problems    Diagnosis     **ESRD (end stage renal disease) on dialysis     Moderate protein-calorie malnutrition      Impression:  Altered mental status  Acute hypoxemic and hypercapnic respiratory failure require mechanical ventilation  Toxic/metabolic encephalopathy  CO2 narcosis  Toxic/metabolic encephalopathy possible hypoactive delirium  Hypotension  NSTEMI, likely from demand ischemia/type II  End-stage renal disease on hemodialysis  Chronic compensated diastolic congestive heart failure  Possible sepsis  Stroke rule out  Type 2 diabetes, now with hypoglycemia  Discitis status post course of vancomycin  Atrial fibrillation  Small chronic right pleural effusion  Femur fracture relative adrenal insufficiency        Plan:  -Continue supplemental O2.  Titrate to maintain SpO2 90% or greater.  -Echocardiogram with EF 56 to 60% no evidence of wall motion abnormalities.   -Antibiotics stopped.  Suspect hypotension was secondary to adrenal insufficiency  -Dialysis per nephrology.  Appreciate assistance  -EEG earlier this hospitalization consistent with encephalopathy  -Cortrak in place.  Tube feeds at goal per dietitian recommendations.  Given his recurrent aspiration, consider PEG tube placement.  -SSI to optimize glucose control    GI prophylaxis with Protonix    DNR/DNI now.  Prognosis very poor    VTE Prophylaxis:  Mechanical VTE prophylaxis orders are present.    CODE  STATUS:   Medical Intervention Limits: No intubation (DNI)  Code Status (Patient has no pulse and is not breathing): No CPR (Do Not Attempt to Resuscitate)  Medical Interventions (Patient has pulse or is breathing): Limited Support    I have reviewed labs, imaging, pertinent clinical data and provider notes.   I have discussed with bedside nurse and primary service.     Electronically signed by Preston Jimenez MD, 01/24/25, 7:11 AM EST.

## 2025-01-24 NOTE — PLAN OF CARE
Goal Outcome Evaluation:  Plan of Care Reviewed With: patient        Progress: no change  Outcome Evaluation: VSS. A&Ox1 to self. Continuous tube feedings maintained at goal rate of 45 mL. Pt up to BSC with heavy 2x assist. PRN guafenesin adminstered for intermittent coughing throughout shift. Pt frequently pulling at cortrak tubing and skin sleeve throughout shift, PRN seroquel administered, see MAR. Safety sitter remains at bedside. No c/o pain or discomfort at this time.

## 2025-01-24 NOTE — PLAN OF CARE
Goal Outcome Evaluation:  Plan of Care Reviewed With: patient        Progress: improving  Outcome Evaluation: VSS. Patient AAOx1. Blood sugars monitored and maintained with SSI. Femur XR complete this shift. Cortrak and safety sitter discontinued. Plan to go to dialysis tomorrow and possible discharge home after. Family at bedside throughout shift. No other concerns or complaints. Continue plan of care.

## 2025-01-24 NOTE — PROGRESS NOTES
[facility]   Hospitalist Progress Note  Date: 2025  Patient Name: Nelson Wang  : 1945  MRN: [id number]  Date of admission: 2025      Subjective   Subjective     Chief Complaint: Follow up for home dialysis unit malfunction    Summary:  79 y.o. ESRD on dialysis, type 2 diabetes, dementia, discitis having completed vancomycin, A-fib, restrictive lung disease.  Patient recently discharged following long hospitalization, admitted from 2024 through 2025.  Patient discharged home with in-home dialysis.  Unfortunately today patient's dialysis machine not working.  Patient's primary nephrologist was contacted, unable to get patient into other outpatient dialysis facilities.  Therefore, decision was made to direct admit patient into the hospital for dialysis as he has not had dialysis for 3 days.  Hospitalist service contacted for admission orders, nephrology consulted for dialysis needs.     Interval Followup:   No issues overnight reported  BP stable, on room air  Remains confused but conversant and following commands better  Tolerating nectar thick liquid diet    Objective   Objective     Vitals:   Temp:  [97.3 °F (36.3 °C)-97.9 °F (36.6 °C)] 97.3 °F (36.3 °C)  Heart Rate:  [75-87] 84  Resp:  [16] 16  BP: (119-166)/(49-63) 143/50  Physical Exam    Constitutional: Chronically ill appearing male, NAD   Respiratory:  nonlabored respirations    Cardiovascular:  RRR   Gastrointestinal: soft   Neurologic: Alert, oriented to person, following simple commands   Skin: Extremities warm    Result Review    Result Review:  I have personally reviewed the following over the last 24 hours (07:00 to 07:00) and agree with the following findings  [x]  Laboratory  CBC          2025    06:42 2025    05:29 2025    05:48   CBC   WBC 6.68  6.43  7.65    RBC 3.18  3.19  3.63    Hemoglobin 8.6  8.7  9.8    Hematocrit 28.5  28.4  34.1    MCV 89.6  89.0  93.9    MCH 27.0  27.3  27.0    MCHC 30.2   30.6  28.7    RDW 19.2  19.2  20.3    Platelets 153  154  196      CMP          1/22/2025    05:29 1/23/2025    05:56 1/24/2025    05:48   CMP   Glucose 148  207  174    BUN 45  63  40    Creatinine 3.56  3.98  2.95    EGFR 16.8  14.7  21.0    Sodium 132  131  131    Potassium 3.4  3.6  4.5    Chloride 95  92  99    Calcium 8.8  8.7  8.8    BUN/Creatinine Ratio 12.6  15.8  13.6    Anion Gap 6.9  11.4  8.2      []  Microbiology  []  Radiology   []  EKG/Telemetry   []  Cardiology/Vascular   []  Pathology  []  Old records  [x]  Other:    Intake/Output Summary (Last 24 hours) at 1/24/2025 0733  Last data filed at 1/24/2025 0619  Gross per 24 hour   Intake 1825 ml   Output 1500 ml   Net 325 ml         Assessment & Plan   Assessment / Plan     Assessment/Plan:  Acute hypoxemic hypercapnic respiratory failure requiring mechanical ventilation  Toxic/metabolic encephalopathy   Hospital-acquired delirium  End-stage renal disease on dialysis  NSTEMI suspect type II from demand ischemia  Hypotension  Hypokalemia  Phosphatemia  Adrenal insufficiency significantly low random cortisol level  Type 2 diabetes  Acute to subacute nondisplaced periprosthetic fracture involving proximal right femur, managing nonoperatively  Atrial fibrillation not on anticoagulation due to falls  Restrictive lung disease   Dementia         Discussed with SLP, advance to mechanical ground solids, thin liquid diet this morning.  High risk for aspiration, needs to continue precautions at home  He is following commands better and p.o. intake improved enough to the point where will remove Cortrak   Pulmonology recommendations reviewed. On room air.  Continue scheduled nebulizers. Supplemental oxygen prn maintain SpO2 above 90%  Weaned off stress dose steroids.  Blood pressure remains stable  Continue nightly Seroquel 12.5 mg. (Qtc on 1/22 465)   Continue Synthroid  Blood sugars reasonably controlled.  Continue low-dose SSI every 6 hours  Nephrology  following, appreciate assistance.  Dialysis planned 1/25. Outpatient dialysis set up [facility]: MWF at 11:20.   Electrolyte supplementation per orders  Nonoperative management of fracture per Ortho. Toe-touch weightbearing with walker.  Pain seems controlled with scheduled Tylenol    Dispo: Plan for dialysis tomorrow with potential discharge home after that    VTE ppx: SCDs    Discussed with patient's daughter, RN, RENETTA    VTE Prophylaxis:  Mechanical VTE prophylaxis orders are present.    CODE STATUS:   Medical Intervention Limits: No intubation (DNI)  Code Status (Patient has no pulse and is not breathing): No CPR (Do Not Attempt to Resuscitate)  Medical Interventions (Patient has pulse or is breathing): Limited Support    Electronically signed by Tyler Smith DO, 01/24/25, 1:34 PM EST.

## 2025-01-24 NOTE — PROGRESS NOTES
"Nutrition Services    Patient Name: Nelson Wang  YOB: 1945  MRN: [id number]  Admission date: 1/11/2025      CLINICAL NUTRITION ASSESSMENT      Reason for Assessment  Follow Up   H&P:  Past Medical History:   Diagnosis Date    Abnormal stress test     Anemia     IRON INFUSIONS WITH DIALYSIS    Anesthesia     WITH HIP REPLACEMENT DAUGHTER BELIEVES HAD SVT 2000    Ankle pain, right     Anxiety and depression     Arthritis     CKD (chronic kidney disease), stage IV     DIALYSIS [id number] SHILEY IN RIGHT CHEST    Diabetes     GERD (gastroesophageal reflux disease)     Gout     High cholesterol     History of peritoneal dialysis     HL (hearing loss)     Hyperkalemia     Hypertension     Hypothyroidism     Insomnia     Night terrors     Psoriasis     Psoriasis     Sleep apnea     Sleep walking     Thrombocytopenia     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    Vitiligo         Current Problems:   Active Hospital Problems    Diagnosis     **ESRD (end stage renal disease) on dialysis     Moderate protein-calorie malnutrition         Nutrition/Diet History         Narrative   Pt and daughter seen this morning. Diet rx, nectar thick, FL diet. Tolerating small amounts of FL diet and ONS.   1/24 SLP recs, Upgrade of diet to mechanical ground solids, thin liquid.   RD to follow per protocol. Continue current feed recs with ONS.     Anthropometrics        Current Height, Weight Height: 162.5 cm (63.98\")  Weight: 73.2 kg (161 lb 6 oz)   Current BMI Body mass index is 27.72 kg/m².   BMI Classification Normal range   % %   Adjusted Body Weight (ABW) N/A   Weight Hx  Wt Readings from Last 2 Encounters:   01/24/25 0520 73.2 kg (161 lb 6 oz)   01/23/25 0515 72.4 kg (159 lb 9.8 oz)   01/21/25 0600 71.5 kg (157 lb 10.1 oz)   01/20/25 0500 72 kg (158 lb 11.7 oz)   01/19/25 1954 72 kg (158 lb 11.7 oz)   01/18/25 0615 72 kg (158 lb 11.7 oz)   01/08/25 0516 64.4 kg (141 lb 15.6 oz)   01/07/25 0500 66.6 kg " (146 lb 13.2 oz)   01/06/25 0500 64.3 kg (141 lb 12.1 oz)   01/05/25 0500 65.1 kg (143 lb 8.3 oz)   01/04/25 0521 64 kg (141 lb 1.5 oz)   01/02/25 0417 64.4 kg (141 lb 15.6 oz)   01/01/25 0600 62 kg (136 lb 11 oz)   12/31/24 0600 62.7 kg (138 lb 3.7 oz)   12/30/24 0917 63.2 kg (139 lb 5.3 oz)   12/29/24 0500 61.8 kg (136 lb 3.9 oz)   12/28/24 0635 63 kg (138 lb 14.2 oz)   12/27/24 0541 63.1 kg (139 lb 1.8 oz)   12/26/24 0738 63.1 kg (139 lb 3.2 oz)   12/25/24 0333 67 kg (147 lb 11.3 oz)   12/24/24 0530 67.9 kg (149 lb 11.1 oz)   12/23/24 0418 68.1 kg (150 lb 2.1 oz)   12/22/24 0525 68.6 kg (151 lb 3.8 oz)   12/21/24 0500 75.4 kg (166 lb 3.6 oz)   12/20/24 0350 67.4 kg (148 lb 9.4 oz)   12/18/24 2009 65.3 kg (143 lb 15.4 oz)   12/18/24 0300 67 kg (147 lb 11.3 oz)   12/17/24 0440 65.8 kg (145 lb 1 oz)   12/16/24 0547 64.3 kg (141 lb 12.1 oz)   12/13/24 0407 62.5 kg (137 lb 12.6 oz)   12/12/24 0500 69.3 kg (152 lb 12.5 oz)   12/09/24 0600 70.2 kg (154 lb 12.2 oz)   12/06/24 1228 70.7 kg (155 lb 13.8 oz)   12/05/24 1850 75.3 kg (166 lb 0.1 oz)          Wt Change Observation UTD, fluctuations noted related to fluids. Pt with ESRD on HD. Overall trend down, -18% x 10 mo      Estimated/Assessed Needs  Estimated Needs based on: Critical Care Guidelines       Energy Requirements 12-25 kcal/kg   EST Needs (kcal/day) 768-1600       Protein Requirements 1.2-2.0 g/kg   EST Daily Needs (g/day)        Fluid Requirements 25 ml/kg    Estimated Needs (mL/day) 1600     Labs/Medications Hyponatremia- will provide water flushes accordingly  Fentanyl, Levo, Propofol, Heparin        Pertinent Labs Reviewed.   Results from last 7 days   Lab Units 01/24/25  0548 01/23/25  0556 01/22/25  0529   SODIUM mmol/L 131* 131* 132*   POTASSIUM mmol/L 4.5 3.6 3.4*   CHLORIDE mmol/L 99 92* 95*   CO2 mmol/L 23.8 27.6 30.1*   BUN mg/dL 40* 63* 45*   CREATININE mg/dL 2.95* 3.98* 3.56*   CALCIUM mg/dL 8.8 8.7 8.8   GLUCOSE mg/dL 174* 207* 148*      Results from last 7 days   Lab Units 01/24/25  0548 01/23/25  0556 01/22/25  0529   MAGNESIUM mg/dL 1.7 1.8 1.9   PHOSPHORUS mg/dL 1.7* 2.4* 1.4*   HEMOGLOBIN g/dL 9.8*  --  8.7*   HEMATOCRIT % 34.1*  --  28.4*     COVID19   Date Value Ref Range Status   01/13/2025 Not Detected Not Detected - Ref. Range Final     Lab Results   Component Value Date    HGBA1C 5.60 01/13/2025         Pertinent Medications Reviewed.     Malnutrition Severity Assessment      Patient meets criteria for : (S) Moderate (non-severe) Malnutrition         Nutrition Diagnosis         Nutrition Dx Problem 1 Moderate malnutrition related to Inability to consume sufficient energy as evidenced by unintended weight change., NPO., and repeated admissions decreasing intake, HD/PD increasing needs     Nutrition Intervention           Current Nutrition Orders & Evaluation of Intake       Current PO Diet Diet: Renal, Diabetic; Consistent Carbohydrate; Low Potassium; Texture: Mechanical Ground (NDD 2); Fluid Consistency: Thin (IDDSI 0)   Supplement Orders Placed This Encounter      Dietary Nutrition Supplements Novasource Renal (Nepro); café mocha           Nutrition Intervention/Prescription        Suggest continue feeds until po intake ~50% of meals or per family GOC/wishes to d/c feeds.  Novasource Renal @ 45 mL/hr x 22 hrs, Flush 40 mL q2h.    Continue Mechanical ground, Renal/carb controlled, Low K diet.  Novasource Renal supplement BID    EN Provides: 1980 kcal, 90g pro, 1183mL fluid (703 FW + 480 flush)         Medical Nutrition Therapy/Nutrition Education          Learner     Readiness Patient  Education not indicated at this time     Method     Response N/A  N/A     Monitor/Evaluation        Monitor Per protocol, Pertinent labs, POC/GOC, Diet advancement     Nutrition Discharge Plan         To be determined     Electronically signed by:  Bárbara Gagnon RD  01/24/25 13:29 EST

## 2025-01-24 NOTE — THERAPY TREATMENT NOTE
Acute Care - Speech Language Pathology   Swallow Treatment Note BRIA Sullivan     Patient Name: Nelson Wang  : 1945  MRN: [id number]  Today's Date: 2025  Onset of Illness/Injury or Date of Surgery: 25     Referring Physician: Devante Rosado      Admit Date: 2025    Visit Dx:     ICD-10-CM ICD-9-CM   1. Chronic respiratory failure with hypoxia  J96.11 518.83     799.02   2. Recurrent pneumonia  J18.9 486   3. Acute on chronic respiratory failure with hypoxia  J96.21 518.84     799.02   4. ESRD (end stage renal disease)  N18.6 585.6   5. Aspiration pneumonia, unspecified aspiration pneumonia type, unspecified laterality, unspecified part of lung  J69.0 507.0   6. Difficulty in walking  R26.2 719.7   7. Oropharyngeal dysphagia  R13.12 787.22     Patient Active Problem List   Diagnosis    Essential hypertension    Bradycardia, sinus    Anemia due to chronic kidney disease    Hypothyroidism    Idiopathic gout    Proteinuria    Psoriasis    Thrombocytopenia    Type 2 diabetes mellitus without complication    CKD (chronic kidney disease), stage IV    Hyperlipidemia    Monoclonal gammopathy of unknown significance (MGUS)    Stage 5 chronic kidney disease    Chronic gout without tophus    Peritoneal dialysis catheter dysfunction    Medicare annual wellness visit, subsequent    Chronic cough    Peritonitis associated with peritoneal dialysis    Recurrent pneumonia    Acute on chronic respiratory failure with hypoxia    ESRD (end stage renal disease)    Aspiration pneumonitis    Sepsis    Type 2 diabetes mellitus, with long-term current use of insulin    GERD without esophagitis    Immunosuppression due to drug therapy    Bipolar disorder    Chronic respiratory failure with hypoxia    Shortness of breath    Abnormal stress test    ESRD on dialysis    Abnormal chest CT    Encounter for screening for malignant neoplasm of colon    History of colon polyps    Family history of colon cancer    Intractable  pain    Abdominal pain    ESRD (end stage renal disease) on dialysis    AMS (altered mental status)    Hallucinations    LUQ pain    Discitis    Metabolic encephalopathy    Aspiration pneumonia    Discitis of lumbar region    Severe malnutrition    Dementia    Unspecified severe protein-calorie malnutrition    Hypoxia    Gram-positive bacteremia    Moderate protein-calorie malnutrition     Past Medical History:   Diagnosis Date    Abnormal stress test     Anemia     IRON INFUSIONS WITH DIALYSIS    Anesthesia     WITH HIP REPLACEMENT DAUGHTER BELIEVES HAD SVT 2000    Ankle pain, right     Anxiety and depression     Arthritis     CKD (chronic kidney disease), stage IV     DIALYSIS [id number] SHILEY IN RIGHT CHEST    Diabetes     GERD (gastroesophageal reflux disease)     Gout     High cholesterol     History of peritoneal dialysis     HL (hearing loss)     Hyperkalemia     Hypertension     Hypothyroidism     Insomnia     Night terrors     Psoriasis     Psoriasis     Sleep apnea     Sleep walking     Thrombocytopenia     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    Vitiligo      Past Surgical History:   Procedure Laterality Date    ABDOMINAL SURGERY      ANKLE SURGERY Right 11/1990    APPENDECTOMY N/A 1954    ARTERIOVENOUS FISTULA/SHUNT SURGERY Left 07/16/2024    Procedure: Creation of left arm arteriovenous fistula;  Surgeon: Moses Mir MD;  Location: [facility] MAIN OR;  Service: Vascular;  Laterality: Left;    BRONCHOSCOPY N/A 03/01/2024    Procedure: BRONCHOSCOPY WITH BAL AND WASHINGS;  Surgeon: Sandra Espinal MD;  Location: [facility] ENDOSCOPY;  Service: Pulmonary;  Laterality: N/A;  PNEUMONIA    BRONCHOSCOPY N/A 08/30/2024    Procedure: BRONCHOSCOPY WITH BALS AND WASHINGS;  Surgeon: Sandra Espinal MD;  Location: [facility] ENDOSCOPY;  Service: Pulmonary;  Laterality: N/A;  MUCOUS PLUGGING    CARDIAC CATHETERIZATION N/A 05/29/2024    Procedure: Left Heart Cath with possible coronary angioplasty;  Surgeon: Magdalena  Stephan Lizama MD;  Location: [facility] CATH INVASIVE LOCATION;  Service: Cardiology;  Laterality: N/A;    COLONOSCOPY N/A 06/2022    [facility]    ENDOSCOPY N/A 10/28/2024    Procedure: ESOPHAGOGASTRODUODENOSCOPY INSERTION OF LIGHTED INSTRUMENT TO VIEW ESOPHAGUS, STOMACH AND SMALL INTESTINE;  Surgeon: Kingsley Peraza MD;  Location: [facility] ENDOSCOPY;  Service: Gastroenterology;  Laterality: N/A;  Gastritis    FRACTURE SURGERY      HIP BIPOLAR REPLACEMENT Right 01/2000    INSERTION HEMODIALYSIS CATHETER Left 04/09/2024    Procedure: HEMODIALYSIS CATHETER INSERTION;  Surgeon: Jose Berry MD;  Location: [facility] OR;  Service: General;  Laterality: Left;    INSERTION HEMODIALYSIS CATHETER N/A 04/12/2024    Procedure: Tunneled hemodialysis catheter insertion;  Surgeon: Enrique Vinson MD;  Location: [facility] OR;  Service: Vascular;  Laterality: N/A;    INSERTION PERITONEAL DIALYSIS CATHETER N/A 03/27/2023    Procedure: LAPAROSCOPIC INSERTION PERITONEAL DIALYSIS CATHETER, LAPAROSCOPIC OMENTOPEXY WITH LYSIS OF ADHESIONS;  Surgeon: Jose Berry MD;  Location: [facility] OR;  Service: General;  Laterality: N/A;    INSERTION PERITONEAL DIALYSIS CATHETER Left 07/23/2023    Procedure: REVISION OF PERITONEAL DIALYSIS CATHETER;  Surgeon: Radha Oreilly MD;  Location: [facility] OR;  Service: General;  Laterality: Left;    JOINT REPLACEMENT      REMOVAL PERITONEAL DIALYSIS CATHETER N/A 04/09/2024    Procedure: REMOVAL PERITONEAL DIALYSIS CATHETER;  Surgeon: Jose Berry MD;  Location: [facility] OR;  Service: General;  Laterality: N/A;    RENAL BIOPSY Left 07/15/2022    UPPER GASTROINTESTINAL ENDOSCOPY      VASCULAR SURGERY      WRIST SURGERY      UNSURE WHICH SIDE DAUGHTER REPORTS HAD SEVERE  CUT FROM WINDOW AND THEY HAD TO DO RECONSTRUCTIVE SURGERY WITH VESSELS AND NERVES       SPEECH PATHOLOGY DYSPHAGIA TREATMENT    Subjective/Behavioral Observations: Alert and  cooperative, sitting up in bed, core track in place      Day/time of Treatment: 1/24/2025      Current Diet: Full liquid, nectar thick      Current Strategies: One-on-one assist to feed self only when patient is fully awake, alert, and participating.  Small single sips of liquid.  May utilize controlled straw drink with nectar liquid.  Check swallow each bite/sip.         Treatment received: Dysphagia therapy to address swallow function through exercises and education of strategies.      Results of treatment: Trials of thin liquid by cup and by straw, patient at times holding bolus, minimal delay.  Occasional cough noted which did not appear directly related to swallow.  Trials of purée and crunchy solid with patient demonstrating adequate bite and chew, swallows completed in a timely manner.      Progress toward goals: Good      Barriers to Achieving goals: Medical status      Plan of care:/changes in plan: 1.Upgrade of diet to mechanical ground solids, thin liquid.  2.  Positioning fully upright for all p.o. intake and 30 minutes following.  3.  Assist patient to feed self when fully alert.  Alternate small bites small sips of solids and liquids at a slow rate.  Medications whole in applesauce, may crush as needed.                                                                                                      EDUCATION  The patient has been educated in the following areas:   Modified Diet Instruction.                Time Calculation:    Time Calculation- SLP       Row Name 01/24/25 1140             Time Calculation- SLP    SLP Stop Time 1030  -TB      SLP Received On 01/24/25  -TB         Untimed Charges    45509-VK Treatment Swallow Minutes 40  -TB         Total Minutes    Untimed Charges Total Minutes 40  -TB       Total Minutes 40  -TB                User Key  (r) = Recorded By, (t) = Taken By, (c) = Cosigned By      Initials Name Provider Type    Katie Triplett SLP Speech and Language Pathologist                     Therapy Charges for Today       Code Description Service Date Service Provider Modifiers Qty    [id number] HC ST TREATMENT SWALLOW 3 1/24/2025 Katie Isaacs, SLP GN 1                 ADILSON Álvarez  1/24/2025

## 2025-01-24 NOTE — PROGRESS NOTES
LOS: 13 days   Patient Care Team:  Domingo Bravo MD as PCP - General (Internal Medicine)  Josephine Warren APRN as Nurse Practitioner (Pulmonary Disease)  Virginie Lopez APRN as Nurse Practitioner (Pulmonary Disease)    Chief Complaint:  Renal issues    Subjective   Sitting in bed, no distress, attentive, talking, still incoherent, reportedly eating well.  Answering questions.  Did not appear to be in pain.    Sitter at bedside.    acetaminophen, 650 mg, Nasogastric, Q6H  arformoterol, 15 mcg, Nebulization, BID - RT  artificial tears, , Both Eyes, 4x Daily  atorvastatin, 40 mg, Nasogastric, Daily  budesonide, 0.5 mg, Nebulization, BID - RT  epoetin trevor/trevor-epbx, 30,000 Units, Subcutaneous, Weekly  insulin lispro, 2-7 Units, Subcutaneous, Q6H  levothyroxine, 175 mcg, Nasogastric, Q AM  ofloxacin, 2 drop, Left Eye, 4x Daily  QUEtiapine, 12.5 mg, Nasogastric, Nightly  senna-docusate sodium, 2 tablet, Nasogastric, BID  sodium chloride, 10 mL, Intravenous, Q12H  sodium chloride, 10 mL, Intravenous, Q12H           Objective     Vital Signs  Temp:  [97.2 °F (36.2 °C)-97.9 °F (36.6 °C)] 97.2 °F (36.2 °C)  Heart Rate:  [83-90] 90  Resp:  [16-18] 18  BP: (138-166)/(50-79) 159/72    Intake/Output Summary (Last 24 hours) at 1/24/2025 1420  Last data filed at 1/24/2025 1258  Gross per 24 hour   Intake 2300 ml   Output --   Net 2300 ml             Physical Exam:     General Appearance:    Awake and alert, in no acute distress.   Head:    Normocephalic, without obvious abnormality, atraumatic, DHT in place.   Throat:    Oral mucosa appear dry   Neck:   No adenopathy, supple, no JVD       Lungs:   Decreased with upper rhonchi bilaterally.  respirations unlabored, no wheezes.    Heart:    Regular rate and rhythm , normal S1 and S2, no            murmur, no gallop, no rub   Abdomen:     Normal bowel sounds, no masses, soft non-tender   Extremities:    No lower extremity edema, no cyanosis, L arm fistula is patent.       Skin:  "  Dry, No rash.  Skin discoloration from psoriasis noted.                  Results Review:    Results from last 7 days   Lab Units 01/24/25  0548 01/23/25  0556 01/22/25  0529   SODIUM mmol/L 131* 131* 132*   POTASSIUM mmol/L 4.5 3.6 3.4*   CHLORIDE mmol/L 99 92* 95*   CO2 mmol/L 23.8 27.6 30.1*   BUN mg/dL 40* 63* 45*   CREATININE mg/dL 2.95* 3.98* 3.56*   GLUCOSE mg/dL 174* 207* 148*   CALCIUM mg/dL 8.8 8.7 8.8     Results from last 7 days   Lab Units 01/24/25  0548 01/22/25  0529 01/20/25  0642   WBC 10*3/mm3 7.65 6.43 6.68   HEMOGLOBIN g/dL 9.8* 8.7* 8.6*   HEMATOCRIT % 34.1* 28.4* 28.5*   PLATELETS 10*3/mm3 196 154 153     Magnesium   Date Value Ref Range Status   01/24/2025 1.7 1.6 - 2.4 mg/dL Final   01/23/2025 1.8 1.6 - 2.4 mg/dL Final   01/22/2025 1.9 1.6 - 2.4 mg/dL Final     Phosphorus   Date Value Ref Range Status   01/24/2025 1.7 (C) 2.5 - 4.5 mg/dL Final   01/23/2025 2.4 (L) 2.5 - 4.5 mg/dL Final   01/22/2025 1.4 (C) 2.5 - 4.5 mg/dL Final     No results found for: \"BNP\"  Lab Results   Component Value Date    ALBUMIN 2.6 (L) 01/18/2025      Brief Urine Lab Results  (Last result in the past 365 days)        Color   Clarity   Blood   Leuk Est   Nitrite   Protein   CREAT   Urine HCG        10/27/24 1417 Yellow   Cloudy   Negative   Trace   Negative   >=300 mg/dL (3+)                    XR Femur 2 View Right    Result Date: 1/24/2025  XR FEMUR 2 VW RIGHT Date of Exam: 1/24/2025 9:39 AM EST Indication: perirprosthetic fracture Comparison: CT pelvis January 11, 2025, radiographs of the femur December 29, 2024. Findings: Status post hip arthroplasty. There is a subtle nondisplaced fracture of the proximal femur, as seen on prior CT. Fracture line can be seen at the lateral cortex on the AP view and extending into the greater trochanter on the lateral view. No definite findings of hardware loosening or other complication. Mineralization appears grossly unchanged. Probable bone infarcts in the distal femoral " medullary space, as before. Calcific atherosclerosis of the superficial femoral artery.     Impression: Impression: 1.Subtle nondisplaced fracture of the proximal femur, as seen on prior CT. 2.Status post hip arthroplasty. Electronically Signed: Enrique Villavicencio  1/24/2025 10:29 AM EST  Workstation ID: [id number]          Assessment & Plan       ESRD (end stage renal disease) on dialysis    Moderate protein-calorie malnutrition    - ESRD, was on home dialysis prior to admission, has left upper extremity AV fistula for access.  Electrolytes are stable.  Dialysis m/w/f previously.  Plan dialysis Saturday before going back to [facility].  Working on outpatient and center arrangement for dialysis until family is retrained to provide HD at home as was previously planned.      - Type 2 diabetes with complications.  Per primary     - Hypotension blood pressure is better now.  UF as tolerated.    - Anemia of chronic kidney disease, goal hemoglobin above 10.  On Epogen 30,000 unit subcu every 7 days.  Monitor.     - Altered mentation.  Delirium, getting slowly better.    - R femur FX- ortho seeing.    - Adrenal insufficiency, remarkable improvement with steroid.  Now discontinued.  Will restart hydrocortisone 10 mg p.o. twice daily and monitor.    - Hypophosphatemia and mild hypokalemia, phos better at 2.4.  monitor.      Please call if any questions  [phone]

## 2025-01-25 ENCOUNTER — READMISSION MANAGEMENT (OUTPATIENT)
Dept: CALL CENTER | Facility: HOSPITAL | Age: 80
End: 2025-01-25
Payer: MEDICARE

## 2025-01-25 VITALS
WEIGHT: 161.38 LBS | HEART RATE: 96 BPM | TEMPERATURE: 98.6 F | DIASTOLIC BLOOD PRESSURE: 84 MMHG | SYSTOLIC BLOOD PRESSURE: 142 MMHG | OXYGEN SATURATION: 90 % | HEIGHT: 64 IN | BODY MASS INDEX: 27.55 KG/M2 | RESPIRATION RATE: 12 BRPM

## 2025-01-25 LAB
ANION GAP SERPL CALCULATED.3IONS-SCNC: 11.8 MMOL/L (ref 5–15)
BUN SERPL-MCNC: 51 MG/DL (ref 8–23)
BUN/CREAT SERPL: 12.4 (ref 7–25)
CALCIUM SPEC-SCNC: 9.1 MG/DL (ref 8.6–10.5)
CHLORIDE SERPL-SCNC: 97 MMOL/L (ref 98–107)
CO2 SERPL-SCNC: 23.2 MMOL/L (ref 22–29)
CREAT SERPL-MCNC: 4.11 MG/DL (ref 0.76–1.27)
EGFRCR SERPLBLD CKD-EPI 2021: 14 ML/MIN/1.73
GLUCOSE BLDC GLUCOMTR-MCNC: 122 MG/DL (ref 70–99)
GLUCOSE BLDC GLUCOMTR-MCNC: 126 MG/DL (ref 70–99)
GLUCOSE BLDC GLUCOMTR-MCNC: 130 MG/DL (ref 70–99)
GLUCOSE BLDC GLUCOMTR-MCNC: 145 MG/DL (ref 70–99)
GLUCOSE SERPL-MCNC: 131 MG/DL (ref 65–99)
MAGNESIUM SERPL-MCNC: 1.8 MG/DL (ref 1.6–2.4)
PHOSPHATE SERPL-MCNC: 3.5 MG/DL (ref 2.5–4.5)
POTASSIUM SERPL-SCNC: 4.6 MMOL/L (ref 3.5–5.2)
SODIUM SERPL-SCNC: 132 MMOL/L (ref 136–145)
WHOLE BLOOD HOLD SPECIMEN: NORMAL

## 2025-01-25 PROCEDURE — 94799 UNLISTED PULMONARY SVC/PX: CPT

## 2025-01-25 PROCEDURE — 83735 ASSAY OF MAGNESIUM: CPT | Performed by: INTERNAL MEDICINE

## 2025-01-25 PROCEDURE — 99239 HOSP IP/OBS DSCHRG MGMT >30: CPT | Performed by: INTERNAL MEDICINE

## 2025-01-25 PROCEDURE — 84100 ASSAY OF PHOSPHORUS: CPT | Performed by: INTERNAL MEDICINE

## 2025-01-25 PROCEDURE — 94668 MNPJ CHEST WALL SBSQ: CPT

## 2025-01-25 PROCEDURE — 82948 REAGENT STRIP/BLOOD GLUCOSE: CPT

## 2025-01-25 PROCEDURE — 80048 BASIC METABOLIC PNL TOTAL CA: CPT | Performed by: INTERNAL MEDICINE

## 2025-01-25 RX ORDER — MIDODRINE HYDROCHLORIDE 5 MG/1
5 TABLET ORAL DAILY PRN
Start: 2025-01-25

## 2025-01-25 RX ORDER — QUETIAPINE FUMARATE 25 MG/1
12.5 TABLET, FILM COATED ORAL NIGHTLY
Start: 2025-01-25 | End: 2025-02-24

## 2025-01-25 RX ORDER — HYDROCORTISONE 10 MG/1
10 TABLET ORAL 2 TIMES DAILY WITH MEALS
Qty: 60 TABLET | Refills: 0 | Status: SHIPPED | OUTPATIENT
Start: 2025-01-25 | End: 2025-01-30 | Stop reason: SDUPTHER

## 2025-01-25 RX ADMIN — ACETAMINOPHEN 650 MG: 160 SOLUTION ORAL at 02:15

## 2025-01-25 RX ADMIN — IPRATROPIUM BROMIDE AND ALBUTEROL SULFATE 3 ML: .5; 3 SOLUTION RESPIRATORY (INHALATION) at 03:04

## 2025-01-25 RX ADMIN — OFLOXACIN 2 DROP: 3 SOLUTION OPHTHALMIC at 08:34

## 2025-01-25 RX ADMIN — Medication 10 ML: at 08:35

## 2025-01-25 RX ADMIN — WHITE PETROLATUM 57.7 %-MINERAL OIL 31.9 % EYE OINTMENT: at 08:34

## 2025-01-25 RX ADMIN — LEVOTHYROXINE SODIUM 175 MCG: 75 TABLET ORAL at 06:21

## 2025-01-25 RX ADMIN — HYDROCORTISONE 10 MG: 10 TABLET ORAL at 08:34

## 2025-01-25 RX ADMIN — Medication 10 ML: at 08:34

## 2025-01-25 RX ADMIN — ACETAMINOPHEN 650 MG: 160 SOLUTION ORAL at 08:34

## 2025-01-25 NOTE — PLAN OF CARE
Goal Outcome Evaluation:  Plan of Care Reviewed With: patient        Progress: improving  Outcome Evaluation: VSS. A&Ox1 to self. Pt up to BSC with heavy 2x assist. PRN duo-neb adminstered per RT for intermittent congested coughing during shift. Pt rested well with family at bedside throughout shift. No c/o pain or discomfort at this time.

## 2025-01-25 NOTE — PLAN OF CARE
Goal Outcome Evaluation:  Plan of Care Reviewed With: family      Pt to discharge home.

## 2025-01-25 NOTE — DISCHARGE SUMMARY
[facility]         HOSPITALIST  DISCHARGE SUMMARY    Patient Name: Nelson Wang  : 1945  MRN: [id number]    Date of Admission: 2025  Date of Discharge:  2024  Primary Care Physician: Domingo Bravo MD    Consults       Date and Time Order Name Status Description    2025  6:21 PM Inpatient Pulmonology Consult Completed     2025 11:12 AM Inpatient Neurology Consult Stroke      2025  5:22 AM Inpatient Orthopedic Surgery Consult Completed     2025 12:56 PM Inpatient Nephrology Consult      2024  6:44 PM Inpatient Nephrology Consult Completed     2024  4:31 PM Inpatient Pulmonology Consult Completed             Active and Resolved Hospital Problems:  Acute hypoxemic hypercapnic respiratory failure requiring mechanical ventilation  Toxic/metabolic encephalopathy   Hospital-acquired delirium  End-stage renal disease on dialysis  NSTEMI suspect type II from demand ischemia  Hypotension  Hypokalemia  Phosphatemia  Adrenal insufficiency, relative  Type 2 diabetes  Acute to subacute nondisplaced periprosthetic fracture involving proximal right femur, managing nonoperatively  Atrial fibrillation not on anticoagulation due to falls  Restrictive lung disease   Dementia    Hospital Course     Hospital Course:  Nelson Wang is a 80 y.o. male with ESRD on hemdialysis, type 2 diabetes, dementia, discitis having completed vancomycin, A-fib, restrictive lung disease.  Recent discharge home following long hospitalization with plans to pursue inpatient dialysis.  He was readmitted to the hospital after home dialysis machine was not working.  Seen by nephrology, dialysis was reinitiated. Hospital course was complicated by multiple issues including metabolic/toxic encephalopathy, acute on chronic hypercapnia requiring intubation, hypotension requiring vasopressors which was attributed to relative adrenal insufficiency.  Following initiation of stress dose steroids  his blood pressure and mentation gradually improved.  CT of the head no acute process.  EEG consistent with encephalopathy.Echocardiogram with no obvious wall motion abnormalities.  Infectious workup negative. He was weaned off vasopressors, extubated, ultimately weaned to room air and cleared for p.o. intake. Transitioned to hydrocortisone 10 mg twice daily.  Of note had imaging of the pelvis which showed right periprosthetic proximal femur fracture.  Seen by orthopedic surgery, conservative measures pursued, was toe-touch as tolerated and did progress with therapy, getting to bedside commode with assistance.  Outpatient hemodialysis was scheduled.  Family was comfortable with him going home.  Discharged in stable condition.  High risk for hospital readmission due to significant comorbidities    Day of Discharge     Vital Signs:  Temp:  [97.2 °F (36.2 °C)-98.6 °F (37 °C)] 98.6 °F (37 °C)  Heart Rate:  [59-93] 90  Resp:  [18] 18  BP: (135-179)/(57-92) 152/77  Physical Exam:   GENERAL: Elderly male, nontoxic  HEART: No edema  LUNGS: nonlabored  ABDOMEN: Soft, nondistended  NEUROLOGIC: Alert, CN intact,      Discharge Details        Discharge Medications        New Medications        Instructions Start Date   hydrocortisone 10 MG tablet  Commonly known as: CORTEF   10 mg, Oral, 2 Times Daily With Meals             Changes to Medications        Instructions Start Date   midodrine 5 MG tablet  Commonly known as: PROAMATINE  What changed:   when to take this  reasons to take this   5 mg, Oral, Daily PRN      QUEtiapine 25 MG tablet  Commonly known as: SEROquel  What changed: how much to take   12.5 mg, Oral, Nightly             Continue These Medications        Instructions Start Date   arformoterol 15 MCG/2ML nebulizer solution  Commonly known as: BROVANA   15 mcg, Nebulization, 2 Times Daily - RT      atorvastatin 40 MG tablet  Commonly known as: LIPITOR   40 mg, Oral, Daily      budesonide 0.5 MG/2ML nebulizer  solution  Commonly known as: PULMICORT   0.5 mg, Nebulization, 2 Times Daily - RT      famotidine 20 MG tablet  Commonly known as: Pepcid   20 mg, Oral, Daily      ipratropium-albuterol 0.5-2.5 mg/3 ml nebulizer  Commonly known as: DUO-NEB   3 mL, Nebulization, 2 Times Daily - RT      levothyroxine 175 MCG tablet  Commonly known as: SYNTHROID, LEVOTHROID   1 tablet, Daily             Stop These Medications      MIRCERA IJ     sevelamer 0.8 g pack packet  Commonly known as: RENVELA              No Known Allergies    Discharge Disposition:  Home-Health Care [facility]    Diet:  Hospital:  Diet Order   Procedures    Diet: Renal, Diabetic; Consistent Carbohydrate; Low Potassium; Texture: Mechanical Ground (NDD 2); Fluid Consistency: Thin (IDDSI 0)       Discharge Activity:   Activity Instructions       Up WIth Assist              CODE STATUS:  Code Status and Medical Interventions: No CPR (Do Not Attempt to Resuscitate); Limited Support; No intubation (DNI)   Ordered at: 01/18/25 1536     Medical Intervention Limits:    No intubation (DNI)     Code Status (Patient has no pulse and is not breathing):    No CPR (Do Not Attempt to Resuscitate)     Medical Interventions (Patient has pulse or is breathing):    Limited Support         Future Appointments   Date Time Provider Department Center   2/13/2025 10:45 AM Sandra Espinal MD [facility]       Additional Instructions for the Follow-ups that You Need to Schedule       Discharge Follow-up with Specified Provider: Dr Chambers; 2 Weeks   As directed      To: Dr Chambers   Follow Up: 2 Weeks                Pertinent  and/or Most Recent Results         LAB RESULTS:      Lab 01/24/25  0548 01/22/25  0529 01/20/25  0642 01/19/25  0450 01/18/25  2037 01/18/25  1341   WBC 7.65 6.43 6.68 6.50  --   --    HEMOGLOBIN 9.8* 8.7* 8.6* 8.7*  --   --    HEMATOCRIT 34.1* 28.4* 28.5* 28.6*  --   --    PLATELETS 196 154 153 145  --   --    NEUTROS ABS  --   --  5.60 5.36  --   --     IMMATURE GRANS (ABS)  --   --  0.04 0.03  --   --    LYMPHS ABS  --   --  0.56* 0.75  --   --    MONOS ABS  --   --  0.47 0.34  --   --    EOS ABS  --   --  0.00 0.00  --   --    MCV 93.9 89.0 89.6 88.5  --   --    APTT  --   --   --  52.3* 129.4* 40.3*         Lab 01/25/25  0623 01/24/25  0548 01/23/25  0556 01/22/25  0529 01/21/25  0615   SODIUM 132* 131* 131* 132* 130*   POTASSIUM 4.6 4.5 3.6 3.4* 3.2*   CHLORIDE 97* 99 92* 95* 93*   CO2 23.2 23.8 27.6 30.1* 21.5*   ANION GAP 11.8 8.2 11.4 6.9 15.5*   BUN 51* 40* 63* 45* 76*   CREATININE 4.11* 2.95* 3.98* 3.56* 6.11*   EGFR 14.0* 21.0* 14.7* 16.8* 8.8*   GLUCOSE 131* 174* 207* 148* 242*   CALCIUM 9.1 8.8 8.7 8.8 9.1   MAGNESIUM 1.8 1.7 1.8 1.9 2.2   PHOSPHORUS 3.5 1.7* 2.4* 1.4* 2.0*                         Brief Urine Lab Results  (Last result in the past 365 days)        Color   Clarity   Blood   Leuk Est   Nitrite   Protein   CREAT   Urine HCG        10/27/24 1417 Yellow   Cloudy   Negative   Trace   Negative   >=300 mg/dL (3+)                 Microbiology Results (last 10 days)       Procedure Component Value - Date/Time    Respiratory Culture - Aspirate, ET Suction [[id number]] Collected: 01/17/25 1738    Lab Status: Final result Specimen: Aspirate from ET Suction Updated: 01/19/25 1059     Respiratory Culture Moderate growth (3+) Normal respiratory lashae. No S. aureus or Pseudomonas aeruginosa detected. Final report.     Gram Stain Moderate (3+) WBCs seen      Few (2+) Mucous strands      Rare (1+) Gram negative bacilli, tiny      Few (2+) Gram positive cocci in pairs and chains      Few (2+) Gram positive cocci in clusters    Blood Culture - Blood, Hand, Right [[id number]]  (Normal) Collected: 01/17/25 0231    Lab Status: Final result Specimen: Blood from Hand, Right Updated: 01/22/25 0245     Blood Culture No growth at 5 days    Narrative:      Less than seven (7) mL's of blood was collected.  Insufficient quantity may yield false negative results.     Blood Culture - Blood, Hand, Right [[id number]]  (Normal) Collected: 01/17/25 0230    Lab Status: Final result Specimen: Blood from Hand, Right Updated: 01/22/25 0245     Blood Culture No growth at 5 days    Narrative:      Less than seven (7) mL's of blood was collected.  Insufficient quantity may yield false negative results.            XR Femur 2 View Right    Result Date: 1/24/2025  Impression: 1.Subtle nondisplaced fracture of the proximal femur, as seen on prior CT. 2.Status post hip arthroplasty. Electronically Signed: Enrique Villavicencio  1/24/2025 10:29 AM EST  Workstation ID: [id number]      Results for orders placed during the hospital encounter of 11/01/24    Duplex Hemodialysis Access CAR    Interpretation Summary    Patent left arteriovenous fistula with normal volume flows.  Moderate arterial anastomotic and proximal inflow stenosis with chronic septum noted.  Otherwise widely patent fistula.      Results for orders placed during the hospital encounter of 11/01/24    Duplex Hemodialysis Access CAR    Interpretation Summary    Patent left arteriovenous fistula with normal volume flows.  Moderate arterial anastomotic and proximal inflow stenosis with chronic septum noted.  Otherwise widely patent fistula.      Results for orders placed during the hospital encounter of 01/11/25    Adult Transthoracic Echo Limited W/ Cont if Necessary Per Protocol    Interpretation Summary    Left ventricular ejection fraction appears to be 56 - 60%.  No obvious wall motion abnormalities.    Some type of catheter appears in the right atrium.    This is a limited echo for ejection fraction.      Time spent on Discharge including face to face service:  >30 minutes  Electronically signed by Tyler Smith DO, 01/26/25, 5:28 PM EST.

## 2025-01-25 NOTE — OUTREACH NOTE
Prep Survey      Flowsheet Row Responses   [facility] patient discharged from? [facility]   Is LACE score < 7 ? No   Eligibility [facility]   Hospital  [facility]   Date of Admission 01/11/25   Date of Discharge 01/25/25   Discharge Disposition Home-Health Care [facility]   Discharge diagnosis ESRD   Does the patient have one of the following disease processes/diagnoses(primary or secondary)? Other   Does the patient have Home health ordered? Yes   What is the Home health agency?  [facility]   Is there a DME ordered? No   Prep survey completed? Yes            CHAITANYA JEREZ - Registered Nurse

## 2025-01-25 NOTE — NURSING NOTE
Pt completed 3.5hr dialysis session. 84BLP and 2L removed.    Pt had no dialysis related difficulties during treatment. Pt BP remained stable and patient slept throughout.     Pt able to answer questions appropriately. Pt alert and oriented to self. Pt niece at the bedside for first half of treatment and wife at the bedside during later half.    Report given to Valerie MARADIAGA. All questions addressed at this time.

## 2025-01-25 NOTE — SIGNIFICANT NOTE
01/25/25 0869   Physical Therapy Time and Intention   Session Not Performed patient unavailable for treatment  (In dialysis)

## 2025-01-25 NOTE — PROGRESS NOTES
LOS: 14 days   Patient Care Team:  Domingo Bravo MD as PCP - General (Internal Medicine)  Josephine Warren APRN as Nurse Practitioner (Pulmonary Disease)  Virginie Lopez APRN as Nurse Practitioner (Pulmonary Disease)    Chief Complaint:  Renal issues    Subjective   Sleeping. no distress, reportedly eating well.  Answering questions.  Did not appear to be in pain.    acetaminophen, 650 mg, Oral, Q6H  arformoterol, 15 mcg, Nebulization, BID - RT  artificial tears, , Both Eyes, 4x Daily  atorvastatin, 40 mg, Oral, Nightly  budesonide, 0.5 mg, Nebulization, BID - RT  epoetin trevor/trevor-epbx, 30,000 Units, Subcutaneous, Weekly  hydrocortisone, 10 mg, Oral, BID With Meals  insulin lispro, 2-7 Units, Subcutaneous, Q6H  levothyroxine, 175 mcg, Oral, Q AM  ofloxacin, 2 drop, Left Eye, 4x Daily  QUEtiapine, 12.5 mg, Oral, Nightly  senna-docusate sodium, 2 tablet, Oral, BID  sodium chloride, 10 mL, Intravenous, Q12H  sodium chloride, 10 mL, Intravenous, Q12H           Objective     Vital Signs  Temp:  [97.2 °F (36.2 °C)-98.6 °F (37 °C)] 98.6 °F (37 °C)  Heart Rate:  [59-93] 91  Resp:  [18] 18  BP: (135-179)/(57-92) 135/57    Intake/Output Summary (Last 24 hours) at 1/25/2025 0743  Last data filed at 1/24/2025 1805  Gross per 24 hour   Intake 715 ml   Output --   Net 715 ml             Physical Exam:     General Appearance:    Awake and alert, in no acute distress.   Head:    Normocephalic, without obvious abnormality, atraumatic,    Throat:    Oral mucosa appear dry   Neck:   No adenopathy, supple, no JVD       Lungs:   Decreased with upper rhonchi bilaterally.  respirations unlabored, no wheezes.    Heart:    Regular rate and rhythm , normal S1 and S2, no            murmur, no gallop, no rub   Abdomen:     Normal bowel sounds, no masses, soft non-tender   Extremities:    No lower extremity edema, no cyanosis, L arm fistula is patent.       Skin:   Dry, No rash.  Skin discoloration from psoriasis noted.            "       Results Review:    Results from last 7 days   Lab Units 01/25/25  0623 01/24/25  0548 01/23/25  0556   SODIUM mmol/L 132* 131* 131*   POTASSIUM mmol/L 4.6 4.5 3.6   CHLORIDE mmol/L 97* 99 92*   CO2 mmol/L 23.2 23.8 27.6   BUN mg/dL 51* 40* 63*   CREATININE mg/dL 4.11* 2.95* 3.98*   GLUCOSE mg/dL 131* 174* 207*   CALCIUM mg/dL 9.1 8.8 8.7     Results from last 7 days   Lab Units 01/24/25  0548 01/22/25  0529 01/20/25  0642   WBC 10*3/mm3 7.65 6.43 6.68   HEMOGLOBIN g/dL 9.8* 8.7* 8.6*   HEMATOCRIT % 34.1* 28.4* 28.5*   PLATELETS 10*3/mm3 196 154 153     Magnesium   Date Value Ref Range Status   01/25/2025 1.8 1.6 - 2.4 mg/dL Final   01/24/2025 1.7 1.6 - 2.4 mg/dL Final   01/23/2025 1.8 1.6 - 2.4 mg/dL Final     Phosphorus   Date Value Ref Range Status   01/24/2025 1.7 (C) 2.5 - 4.5 mg/dL Final   01/23/2025 2.4 (L) 2.5 - 4.5 mg/dL Final     No results found for: \"BNP\"  Lab Results   Component Value Date    ALBUMIN 2.6 (L) 01/18/2025      Brief Urine Lab Results  (Last result in the past 365 days)        Color   Clarity   Blood   Leuk Est   Nitrite   Protein   CREAT   Urine HCG        10/27/24 1417 Yellow   Cloudy   Negative   Trace   Negative   >=300 mg/dL (3+)                    XR Femur 2 View Right    Result Date: 1/24/2025  XR FEMUR 2 VW RIGHT Date of Exam: 1/24/2025 9:39 AM EST Indication: perirprosthetic fracture Comparison: CT pelvis January 11, 2025, radiographs of the femur December 29, 2024. Findings: Status post hip arthroplasty. There is a subtle nondisplaced fracture of the proximal femur, as seen on prior CT. Fracture line can be seen at the lateral cortex on the AP view and extending into the greater trochanter on the lateral view. No definite findings of hardware loosening or other complication. Mineralization appears grossly unchanged. Probable bone infarcts in the distal femoral medullary space, as before. Calcific atherosclerosis of the superficial femoral artery.     Impression: Impression: " 1.Subtle nondisplaced fracture of the proximal femur, as seen on prior CT. 2.Status post hip arthroplasty. Electronically Signed: Enrique Maye  1/24/2025 10:29 AM EST  Workstation ID: [id number]          Assessment & Plan       ESRD (end stage renal disease) on dialysis    Moderate protein-calorie malnutrition    - ESRD, was on home dialysis prior to admission, has left upper extremity AV fistula for access.  Electrolytes are stable.  Dialysis m/w/f previously.  Plan dialysis Saturday before going back to [facility].        - Type 2 diabetes with complications.  Per primary     - Hypotension blood pressure is better now.  UF as tolerated.    - Anemia of chronic kidney disease, goal hemoglobin above 10.  On Epogen 30,000 unit subcu every 7 days.  Monitor.     - Altered mentation.  Delirium, getting slowly better.    - R femur FX- ortho seeing.    - Adrenal insufficiency, remarkable improvement with steroid.  Now discontinued.  Restarted on hydrocortisone 10 mg p.o. twice daily and monitor.    - Hypophosphatemia and mild hypokalemia, phos low yesterday and replaced IV. Monitor.    Please call if any questions  [phone]

## 2025-01-27 ENCOUNTER — TRANSITIONAL CARE MANAGEMENT TELEPHONE ENCOUNTER (OUTPATIENT)
Dept: CALL CENTER | Facility: HOSPITAL | Age: 80
End: 2025-01-27
Payer: MEDICARE

## 2025-01-27 NOTE — OUTREACH NOTE
"Call Center TCM Note      Flowsheet Row Responses   [facility] patient discharged from? [facility]   Does the patient have one of the following disease processes/diagnoses(primary or secondary)? Other   TCM attempt successful? Yes   Call start time 1607   Call end time 1611   Discharge diagnosis ESRD   Person spoke with today (if not patient) and relationship Dimpi, dtr   Meds reviewed with patient/caregiver? Yes   Is the patient having any side effects they believe may be caused by any medication additions or changes? No   Does the patient have all medications ordered at discharge? Yes   Prescription comments hydrocortisone added at discharge and also changes noted to midodrine and seroquel   Is the patient taking all medications as directed (includes completed medication regime)? Yes   Comments Pt has HD on M-W-F at 1120,  will send to office coordinate scheduling as no appts available within TCM   Does the patient have an appointment with their PCP within 7-14 days of discharge? No appointments available   Nursing Interventions Routed TCM call to PCP office   What is the Home health agency?  [facility]   Has home health visited the patient within 72 hours of discharge? Yes   What DME was ordered? hospital bed   Has all DME been delivered? Yes   Psychosocial issues? No   Did the patient receive a copy of their discharge instructions? Yes   Nursing interventions Reviewed instructions with patient  [with dtr]   What is the patient's perception of their health status since discharge? Improving  [Dtr reports pt is doing \"great\" and able to stand now which is an improvement.  Pt started HD at clinic today and tolerated without issues.  Dtr has no questions for this RN.]   Is the patient/caregiver able to teach back signs and symptoms related to disease process for when to call PCP? Yes   TCM call completed? Yes   Call end time 1611            Estephania Arzate RN    1/27/2025, 16:14 EST        "

## 2025-01-27 NOTE — OUTREACH NOTE
Call Center TCM Note      Flowsheet Row Responses   [facility] patient discharged from? [facility]   Does the patient have one of the following disease processes/diagnoses(primary or secondary)? Other   TCM attempt successful? No  [verbal release for Jose Wang]   Unsuccessful attempts Attempt 1   Call Status Left message            Estephania Arzate RN    1/27/2025, 08:33 EST

## 2025-01-28 ENCOUNTER — TELEPHONE (OUTPATIENT)
Dept: INTERNAL MEDICINE | Facility: CLINIC | Age: 80
End: 2025-01-28
Payer: MEDICARE

## 2025-01-28 NOTE — TELEPHONE ENCOUNTER
Hub staff attempted to follow warm transfer process and was unsuccessful     Caller: MARCUS CACERES    Relationship to patient: Emergency Contact    Best call back number: [phone]    Patient is needing: CALLER WAS RETURNING A CALL TO [employer]. UNABLE TO COMPLETE WARM TRANSFER, CALLER DISCONNECTED THE CALL.

## 2025-01-28 NOTE — PROGRESS NOTES
Transitional Care Follow Up Visit  Subjective     Nelson Wang is a 80 y.o. male who presents for a transitional care management visit.    Within 48 business hours after discharge our office contacted him via telephone to coordinate his care and needs.      I reviewed and discussed the details of that call along with the discharge summary, hospital problems, inpatient lab results, inpatient diagnostic studies, and consultation reports with Nelson.     Current outpatient and discharge medications have been reconciled for the patient.  Reviewed by: Domingo Bravo MD          1/25/2025     3:15 PM   Date of TCM Phone Call   [facility]   Date of Admission 1/11/2025   Date of Discharge 1/25/2025   Discharge Disposition Home-Health Care [facility]     Risk for Readmission (LACE) Score: 19 (1/25/2025  6:00 AM)      History of Present Illness     Chief Complaint   Patient presents with    Hospital Follow Up Visit    Cough     Daughter started him on a zpak yesterday, wants to see about getting a cough medication sent in for him.       Course During Hospital Stay:  2 weeks      The following portions of the patient's history were reviewed and updated as appropriate: allergies, current medications, past family history, past medical history, past social history, past surgical history, and problem list.      Hospital Course:    Nelson Wang is a 80 y.o. male with ESRD on hemdialysis, type 2 diabetes, dementia, discitis having completed vancomycin, A-fib, restrictive lung disease.  Recent discharge home following long hospitalization with plans to pursue inpatient dialysis.  He was readmitted to the hospital after home dialysis machine was not working.  Seen by nephrology, dialysis was reinitiated. Hospital course was complicated by multiple issues including metabolic/toxic encephalopathy, acute on chronic hypercapnia requiring intubation, hypotension requiring vasopressors which was attributed to relative adrenal  insufficiency.  Following initiation of stress dose steroids his blood pressure and mentation gradually improved.  CT of the head no acute process.  EEG consistent with encephalopathy.Echocardiogram with no obvious wall motion abnormalities.  Infectious workup negative. He was weaned off vasopressors, extubated, ultimately weaned to room air and cleared for p.o. intake. Transitioned to hydrocortisone 10 mg twice daily.  Of note had imaging of the pelvis which showed right periprosthetic proximal femur fracture.  Seen by orthopedic surgery, conservative measures pursued, was toe-touch as tolerated and did progress with therapy, getting to bedside commode with assistance.  Outpatient hemodialysis was scheduled.  Family was comfortable with him going home.  Discharged in stable condition.  High risk for hospital readmission due to significant comorbidities    Interim Course:    Historian: Niece    Has been receiving outpatient dialysis rather than at home dialysis as home equipment isn't functioning properly.    Family requests endocrinology referral.    He will be seeing pulm and ortho next month.    Mentation has improved somewhat.    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    Objective   Vitals:    01/30/25 1109   BP: 145/86   Pulse: 81   Temp: 97.8 °F (36.6 °C)   SpO2: 91%       Appearance: No acute distress, well-nourished  Head: normocephalic, atraumatic  Eyes: no scleral icterus, no conjunctival injection  Ears, Nose, and Throat: external ears normal  Cardiovascular: regular rate and rhythm. II/VI LUSB systolic murmur noted.. no edema.  LUE fistula noted with dressing that is clean, dry and intact  Respiratory: crackles noted, LLL  GI: soft, NTTP, no masses or HSM  Neuro: alert and oriented to person and place, but not time.    Result Review :   The following data was reviewed by: Domingo Bravo MD  on 01/30/2025:  Common labs          1/23/2025    05:56 1/24/2025    05:48 1/25/2025    06:23   Common Labs   Glucose 207  174  131    BUN 63  40  51    Creatinine 3.98  2.95  4.11    Sodium 131  131  132    Potassium 3.6  4.5  4.6    Chloride 92  99  97    Calcium 8.7  8.8  9.1    WBC  7.65     Hemoglobin  9.8     Hematocrit  34.1     Platelets  196         No orders to display            Lab Results   Component Value Date    COVID19 Not Detected 01/13/2025    RSV Not Detected 01/13/2025    INR 1.07 01/17/2025    BILIRUBINUR Negative 10/27/2024       Assessment & Plan     Diagnoses and all orders for this visit:    1. Acute hypoxemic respiratory failure (Primary)    2. Delirium with dementia    3. ESRD (end stage renal disease) on dialysis    4. Adrenal insufficiency  -     hydrocortisone (CORTEF) 10 MG tablet; Take 1 tablet by mouth 2 (Two) Times a Day With Meals for 30 days.  Dispense: 60 tablet; Refill: 0  -     Ambulatory Referral to Endocrinology    5. Other fracture of right femur, subsequent encounter for closed fracture with routine healing    6. Pneumonia due to infectious organism, unspecified laterality, unspecified part of lung  -     amoxicillin-clavulanate (Augmentin) 500-125 MG per tablet; Take 1 tablet by mouth 2 (Two) Times a Day for 7 days.  Dispense: 14 tablet; Refill: 0  -     Cancel: XR Chest PA & Lateral; Future  -     XR Chest PA & Lateral; Future      Hypoxic respiratory failure:  -resolved    PNA:  -discussed with family ER parameters (e.g., worsening respiratory status)  -will add augmentin to azithromycin started by his daughter.  CXR also ordered  -as he is HDS (in fact, his BP is rather elevated), with improving mentation, I did  that I do not feel that stress dose steroids are indicated at this time.  I did discuss that his daughter, who works as a hospitalist, should monitor him for worsening status as during acute infections he may benefit from stress dose  steroids    Medications Discontinued During This Encounter   Medication Reason    hydrocortisone (CORTEF) 10 MG tablet Reorder       Return in about 2 months (around 3/30/2025) for Medicare Wellness.           Domingo Bravo MD  01/30/25  12:34 EST

## 2025-01-28 NOTE — TELEPHONE ENCOUNTER
Hub staff attempted to follow warm transfer process and was unsuccessful     Caller: MARCUS CACERES    Relationship to patient: Emergency Contact    Best call back number:     [phone]       Patient is needing: PATIENT'S DAUGHTER CALLED BACK BECAUSE SHE DID NOT MEAN TO DISCONNECT THE CALL.     SHE STATES THAT SHE MISSED A CALL FROM DARLENE.

## 2025-01-28 NOTE — OUTREACH NOTE
Called daughter of patient to try and schedule hospital follow up. Left VM for her to return my call.

## 2025-01-30 ENCOUNTER — OFFICE VISIT (OUTPATIENT)
Dept: INTERNAL MEDICINE | Facility: CLINIC | Age: 80
End: 2025-01-30
Payer: MEDICARE

## 2025-01-30 ENCOUNTER — TELEPHONE (OUTPATIENT)
Dept: INTERNAL MEDICINE | Facility: CLINIC | Age: 80
End: 2025-01-30

## 2025-01-30 ENCOUNTER — HOSPITAL ENCOUNTER (OUTPATIENT)
Dept: GENERAL RADIOLOGY | Facility: HOSPITAL | Age: 80
Discharge: HOME | End: 2025-01-30
Admitting: STUDENT IN AN ORGANIZED HEALTH CARE EDUCATION/TRAINING PROGRAM
Payer: MEDICARE

## 2025-01-30 VITALS
HEART RATE: 81 BPM | HEIGHT: 64 IN | OXYGEN SATURATION: 91 % | TEMPERATURE: 97.8 F | SYSTOLIC BLOOD PRESSURE: 145 MMHG | DIASTOLIC BLOOD PRESSURE: 86 MMHG | BODY MASS INDEX: 27.7 KG/M2

## 2025-01-30 DIAGNOSIS — F05 DELIRIUM WITH DEMENTIA: ICD-10-CM

## 2025-01-30 DIAGNOSIS — E27.40 ADRENAL INSUFFICIENCY: ICD-10-CM

## 2025-01-30 DIAGNOSIS — N18.6 ESRD (END STAGE RENAL DISEASE) ON DIALYSIS: ICD-10-CM

## 2025-01-30 DIAGNOSIS — Z99.2 ESRD (END STAGE RENAL DISEASE) ON DIALYSIS: ICD-10-CM

## 2025-01-30 DIAGNOSIS — F03.90 DELIRIUM WITH DEMENTIA: ICD-10-CM

## 2025-01-30 DIAGNOSIS — J18.9 PNEUMONIA DUE TO INFECTIOUS ORGANISM, UNSPECIFIED LATERALITY, UNSPECIFIED PART OF LUNG: ICD-10-CM

## 2025-01-30 DIAGNOSIS — J96.01 ACUTE HYPOXEMIC RESPIRATORY FAILURE: Primary | ICD-10-CM

## 2025-01-30 DIAGNOSIS — S72.8X1D OTHER FRACTURE OF RIGHT FEMUR, SUBSEQUENT ENCOUNTER FOR CLOSED FRACTURE WITH ROUTINE HEALING: ICD-10-CM

## 2025-01-30 PROCEDURE — 3077F SYST BP >= 140 MM HG: CPT | Performed by: STUDENT IN AN ORGANIZED HEALTH CARE EDUCATION/TRAINING PROGRAM

## 2025-01-30 PROCEDURE — 3079F DIAST BP 80-89 MM HG: CPT | Performed by: STUDENT IN AN ORGANIZED HEALTH CARE EDUCATION/TRAINING PROGRAM

## 2025-01-30 PROCEDURE — 99214 OFFICE O/P EST MOD 30 MIN: CPT | Performed by: STUDENT IN AN ORGANIZED HEALTH CARE EDUCATION/TRAINING PROGRAM

## 2025-01-30 PROCEDURE — 1111F DSCHRG MED/CURRENT MED MERGE: CPT | Performed by: STUDENT IN AN ORGANIZED HEALTH CARE EDUCATION/TRAINING PROGRAM

## 2025-01-30 PROCEDURE — 71046 X-RAY EXAM CHEST 2 VIEWS: CPT

## 2025-01-30 PROCEDURE — 1126F AMNT PAIN NOTED NONE PRSNT: CPT | Performed by: STUDENT IN AN ORGANIZED HEALTH CARE EDUCATION/TRAINING PROGRAM

## 2025-01-30 RX ORDER — HYDROCORTISONE 10 MG/1
10 TABLET ORAL 2 TIMES DAILY WITH MEALS
Qty: 60 TABLET | Refills: 0 | Status: SHIPPED | OUTPATIENT
Start: 2025-01-30 | End: 2025-03-01

## 2025-01-30 RX ORDER — BENZONATATE 100 MG/1
100 CAPSULE ORAL 3 TIMES DAILY PRN
Qty: 60 CAPSULE | Refills: 0 | Status: SHIPPED | OUTPATIENT
Start: 2025-01-30

## 2025-01-30 RX ORDER — AMOXICILLIN AND CLAVULANATE POTASSIUM 500; 125 MG/1; MG/1
1 TABLET, FILM COATED ORAL 2 TIMES DAILY
Qty: 14 TABLET | Refills: 0 | Status: SHIPPED | OUTPATIENT
Start: 2025-01-30 | End: 2025-02-06

## 2025-01-30 RX ORDER — AZITHROMYCIN 250 MG/1
250 TABLET, FILM COATED ORAL DAILY
COMMUNITY

## 2025-01-30 NOTE — TELEPHONE ENCOUNTER
Caller: KALEIGH CACERES    Relationship: Emergency Contact    Best call back number: [phone]     What is the best time to reach you: ASAP    Who are you requesting to speak with (clinical staff, provider,  specific staff member): CLINICAL STAFF    What was the call regarding: MEDICATIONS HAVE NOT BEEN RECEIVED BY THE PHARMACY AND THEY WOULD LIKE TO CONFIRM A COUGH SYRUP WAS BEING CALLED IN

## 2025-01-31 ENCOUNTER — TELEPHONE (OUTPATIENT)
Dept: INTERNAL MEDICINE | Facility: CLINIC | Age: 80
End: 2025-01-31
Payer: MEDICARE

## 2025-01-31 NOTE — TELEPHONE ENCOUNTER
Alfreda from [facility] callted with PT freq.  1 x wk for 9 wk.  Not getting verbal will fax orders.

## 2025-01-31 NOTE — TELEPHONE ENCOUNTER
I was unsure the exact number to call back, I looked up the number to our local [facility] and called that but was unable to get a hold of anyone.

## 2025-02-03 ENCOUNTER — TELEPHONE (OUTPATIENT)
Dept: INTERNAL MEDICINE | Facility: CLINIC | Age: 80
End: 2025-02-03
Payer: MEDICARE

## 2025-02-03 NOTE — TELEPHONE ENCOUNTER
Spoke with Estephania at [facility] in regards to verbal orders, no further questions or concerns at this time.

## 2025-02-03 NOTE — TELEPHONE ENCOUNTER
Caller: Negar     Relationship:OT     Best call back number: [phone]     Who are you requesting to speak with (clinical staff, provider,  specific staff member): clinical      What was the call regarding:  needing verbal orders for OT  2x wk for 1wk  1x wk for 7 wk  ADL, strength, functional mobility, transferring, falls risk

## 2025-02-04 ENCOUNTER — TELEPHONE (OUTPATIENT)
Dept: ORTHOPEDIC SURGERY | Facility: CLINIC | Age: 80
End: 2025-02-04

## 2025-02-04 NOTE — TELEPHONE ENCOUNTER
Caller: EMILY    Relationship to patient: DAUGHTER    Best call back number: [phone]    Type of visit: PATIENT HAD DIALYSIS YESTERDAY AND IS VERY TIRED- WILL NOT GET UP- CANNOT MAKE APPT FOR TODAY- DAUGHTER WANTS TO RESCHEDULE FIRST AVAILABLE IS 2/14/25- FRACTURE DX- PLEASE CALL

## 2025-02-11 ENCOUNTER — OFFICE VISIT (OUTPATIENT)
Dept: ORTHOPEDIC SURGERY | Facility: CLINIC | Age: 80
End: 2025-02-11
Payer: MEDICARE

## 2025-02-11 VITALS — BODY MASS INDEX: 23.05 KG/M2 | OXYGEN SATURATION: 98 % | HEART RATE: 67 BPM | WEIGHT: 135 LBS | HEIGHT: 64 IN

## 2025-02-11 DIAGNOSIS — M89.8X5 PAIN IN RIGHT FEMUR: Primary | ICD-10-CM

## 2025-02-11 DIAGNOSIS — S72.144A CLOSED NONDISPLACED INTERTROCHANTERIC FRACTURE OF RIGHT FEMUR, INITIAL ENCOUNTER: ICD-10-CM

## 2025-02-11 LAB
QT INTERVAL: 467 MS
QTC INTERVAL: 462 MS

## 2025-02-11 NOTE — PROGRESS NOTES
"Chief Complaint  Pain and Initial Evaluation of the Right Femur       Subjective      Nelson Wang presents to [facility] for an evaluation  of his right femur. He was recently admitted to the hospital where I consultanted the patient regarding a right femur fracture. He has been treating this non operatively. He presents today in a wheel chair. He is currently on dialysis. He has pain with weightbearing and prolonged walking. He locates his pain to the thigh region. He denies any new injury or falls since his last visit. He had prior revision right hip arthroplasty about twenty years ago. He has been doing physical therapy.     No Known Allergies     Social History     Socioeconomic History    Marital status:    Tobacco Use    Smoking status: Former     Current packs/day: 0.00     Average packs/day: 1 pack/day for 10.0 years (10.0 ttl pk-yrs)     Types: Cigarettes     Start date:      Quit date:      Years since quittin.1     Passive exposure: Past    Smokeless tobacco: Never    Tobacco comments:     quit 25 years ago, chews nicotine gum in past   Vaping Use    Vaping status: Former   Substance and Sexual Activity    Alcohol use: Not Currently     Comment: 1 DRINK/DAY-rarely    Drug use: Never    Sexual activity: Defer        I reviewed the patient's chief complaint, history of present illness, review of systems, past medical history, surgical history, family history, social history, medications, and allergy list.     Review of Systems     Constitutional: Denies fevers, chills, weight loss  Cardiovascular: Denies chest pain, shortness of breath  Skin: Denies rashes, acute skin changes  Neurologic: Denies headache, loss of consciousness  MSK: right femur pain       Vital Signs:   Pulse 67   Ht 162.6 cm (64\")   Wt 61.2 kg (135 lb)   SpO2 98%   BMI 23.17 kg/m²            Ortho Exam    Physical Exam  General:Alert. No acute distress     Right lower extremity: hip " flexion  to 80 degrees, external rotation  to 20 degrees, internal rotation to 5 degrees, can hold a straight leg raise, negative  straight leg raise, well healed surgical incision, intact dorsiflexion and plantar flexion , distal neurovascularly intact, positive  pulses, calf soft, positive EHL, FHL, GS, and TA. Sensation intact to all 5 nerves of the foot.      Procedures    X-Ray Report:  Right femur  X-Ray  Indication: Evaluation of right femur pain   AP/Lateral view(s)  Findings: intact modular hip arthroplasty, no signs of hardware loosening or subsidence, healing nondisplaced intertrochanteric femur fracture with callous formation    Prior studies available for comparison: yes       Imaging Results (Most Recent)       Procedure Component Value Units Date/Time    XR Femur 2 View Right [[id number]] Resulted: 02/11/25 1304     Updated: 02/11/25 1321             Result Review :       XR Chest PA & Lateral    Result Date: 1/30/2025  Narrative: XR CHEST PA AND LATERAL Date of Exam: 1/30/2025 12:28 PM EST Indication: suspected pneumonia Comparison: 1/17/2025 Findings: Heart size borderline. Heart size and pulmonary vasculature are stable. There is airspace disease in the right lower lobe better demonstrated in the lateral view. Left lung grossly clear     Impression: Impression: Right lower lobe airspace disease suspicious for pneumonia Electronically Signed: Donovan Crespo  1/30/2025 12:50 PM EST  Workstation ID: [id number]    XR Femur 2 View Right    Result Date: 1/24/2025  Narrative: XR FEMUR 2 VW RIGHT Date of Exam: 1/24/2025 9:39 AM EST Indication: perirprosthetic fracture Comparison: CT pelvis January 11, 2025, radiographs of the femur December 29, 2024. Findings: Status post hip arthroplasty. There is a subtle nondisplaced fracture of the proximal femur, as seen on prior CT. Fracture line can be seen at the lateral cortex on the AP view and extending into the greater trochanter on the lateral view. No definite  findings of hardware loosening or other complication. Mineralization appears grossly unchanged. Probable bone infarcts in the distal femoral medullary space, as before. Calcific atherosclerosis of the superficial femoral artery.     Impression: Impression: 1.Subtle nondisplaced fracture of the proximal femur, as seen on prior CT. 2.Status post hip arthroplasty. Electronically Signed: Enrique Garciacastro  1/24/2025 10:29 AM EST  Workstation ID: [id number]    Adult Transthoracic Echo Limited W/ Cont if Necessary Per Protocol    Result Date: 1/17/2025  Narrative:   Left ventricular ejection fraction appears to be 56 - 60%.  No obvious wall motion abnormalities.   Some type of catheter appears in the right atrium.   This is a limited echo for ejection fraction.     XR Chest 1 View    Result Date: 1/17/2025  Narrative: XR CHEST 1 VW-  Date of exam: 1/17/2025, 2:15 A.M.  Comparison: 1/12/2025.  INDICATIONS: Respiratory failure; j96.11-chronic respiratory failure with hypoxia; j18.9-pneumonia, unspecified organism; j96.21-acute and chronic respiratory failure with hypoxia; n18.6-end stage renal disease; j69.0-pneumonitis due to inhalation of food and vomit.  FINDINGS: A single AP supine portable chest radiograph was performed. There has been interval endotracheal (ET) intubation. The distal endotracheal (ET) tube tip is at the orifice of the right mainstem bronchus. Consider retracting the endotracheal (ET) tube approximately 2 to 3 cm in order to optimize its positioning relative to the mel. The distal nasoenteral feeding tube is projected below the diaphragm. It is a new finding since the prior study. Its distal tip is partially imaged in the expected location of the distal gastric lumen. Bilateral infiltrates persist, greater on the right than the left. Overall, they are thought to be decreased in degree since the prior exam. Small pleural effusions are suggested. There may be mild cardiac enlargement. No pneumothorax is  seen. The thoracic aorta is atherosclerotic. External artifacts obscure detail.       Impression: The study is ABNORMAL. The endotracheal (ET) tube tip is in the expected right mainstem bronchus. Consider retracting the endotracheal (ET) tube approximately 2 to 3 cm in order to optimize its positioning relative to the mel. Please see above comments for further detail.   Portions of this note were completed with a voice recognition program.  Electronically Signed By-Stan Hogan MD On:1/17/2025 2:36 AM      EEG    Result Date: 1/13/2025  Narrative: Reason for referral: 79 y.o.male with altered mental status Technical Summary:  A 19 channel digital EEG was performed using the international 10-20 placement system, including eye leads and EKG leads. Duration: 26 minutes Findings: The patient is resting quietly in bed.  The dominant finding on the study is of generalized but frontal dominant triphasic sharp waves which occur in prolonged bursts. They are most often periodic with a discharge frequency of slightly greater than 1 Hz.  There is no evolution in waveform morphology or frequency.  When discharges is not present, diffuse medium amplitude 4-6 Hz intermixed delta and theta activity are seen symmetrically over both hemispheres.  Photic stimulation does not change the background.  Hyperventilation is not performed.  Stage II sleep is not clearly seen. Video: Available Technical quality: Good SUMMARY: Extremely frequent bursts of generalized but frontal dominant triphasic sharp waves, at her just greater to 1 Hz No lateralizing features are seen Underlying background is moderate generalized slow     Impression: Underlying background suggest diffuse cerebral dysfunction of moderate degree, most commonly seen due to toxic/metabolic cause The triphasic sharp waves are abnormal but nonspecific.  These are most commonly seen due to significant toxic/metabolic disarray, most notably liver or kidney dysfunction.  They  may also be seen as a side effect of certain medications such as cefepime.  Less likely, they can also be found on the ictal/interictal spectrum This report is transcribed using the Dragon dictation system.      Adult Transthoracic Echo Complete w/ Color, Spectral and Contrast if necessary per protocol    Result Date: 1/13/2025  Narrative:   Left ventricular ejection fraction appears to be 61 - 65%.   Left ventricular diastolic function is consistent with (grade I) impaired relaxation.   The left atrial cavity is mildly dilated.   Estimated right ventricular systolic pressure from tricuspid regurgitation is moderately elevated (45-55 mmHg).     XR Chest 1 View    Result Date: 1/12/2025  Narrative: XR CHEST 1 VW-  Date of exam: 1/12/2025, 8:53 P.M.  Indication: Congestion; j96.11-chronic respiratory failure with hypoxia; j18.9-pneumonia, unspecified organism; j96.21-acute and chronic respiratory failure with hypoxia; n18.6-end stage renal disease; j69.0-pneumonitis due to inhalation of food and vomit.  Comparison: 1/1/2025.  FINDINGS: A single AP semi-upright portable view of the chest is provided for review. Bilateral infiltrates are seen. The findings may represent infectious multifocal pneumonia. Pulmonary edema cannot be excluded. Aspiration pneumonia cannot be excluded. Mild-to-moderate cardiomegaly is suspected, as before. There may be small-to-moderate bilateral pleural effusions. External artifacts obscure detail. The thoracic aorta is prominent, tortuous, and calcified.       Impression: Increased bilateral infiltrates are seen. The findings may represent infectious multifocal pneumonia. Aspiration pneumonia cannot be excluded. Pulmonary edema is possible. A combination of these differential considerations may exist. Please see above comments for further detail.    Portions of this note were completed with a voice recognition program.  Electronically Signed By-Stan Hogan MD On:1/12/2025 11:24 PM               Assessment and Plan     Diagnoses and all orders for this visit:    1. Pain in right femur (Primary)  -     XR Femur 2 View Right    2. Closed nondisplaced intertrochanteric fracture of right femur, initial encounter       The patient presents here today for an evaluation  of his right femur. X-rays were obtained in the office today and these were reviewed today.     He will continue physical therapy and can progress with weight bearing and range of motion as tolerated.     He will continue current medications for pain control as he is not able to take anti inflammatories due to his kidneys.       Call or return if worsening symptoms.    Follow Up     6 weeks     Will obtain X-Rays of right hip  at next visit.       Patient was given instructions and counseling regarding his condition or for health maintenance advice. Please see specific information pulled into the AVS if appropriate.     Scribed for Shiraz Chambers MD by Alexandria Bae.  02/11/25   13:21 EST    I have personally performed the services described in this document as scribed by the above individual and it is both accurate and complete. Shiraz Chambers MD 02/11/25

## 2025-02-13 ENCOUNTER — OFFICE VISIT (OUTPATIENT)
Dept: PULMONOLOGY | Facility: CLINIC | Age: 80
End: 2025-02-13
Payer: MEDICARE

## 2025-02-13 ENCOUNTER — HOSPITAL ENCOUNTER (OUTPATIENT)
Dept: GENERAL RADIOLOGY | Facility: HOSPITAL | Age: 80
Discharge: HOME | End: 2025-02-13
Admitting: STUDENT IN AN ORGANIZED HEALTH CARE EDUCATION/TRAINING PROGRAM
Payer: MEDICARE

## 2025-02-13 VITALS
HEIGHT: 64 IN | OXYGEN SATURATION: 96 % | WEIGHT: 138 LBS | BODY MASS INDEX: 23.56 KG/M2 | HEART RATE: 79 BPM | SYSTOLIC BLOOD PRESSURE: 172 MMHG | TEMPERATURE: 97.5 F | RESPIRATION RATE: 18 BRPM | DIASTOLIC BLOOD PRESSURE: 84 MMHG

## 2025-02-13 DIAGNOSIS — J18.9 RECURRENT PNEUMONIA: ICD-10-CM

## 2025-02-13 DIAGNOSIS — R05.3 CHRONIC COUGH: ICD-10-CM

## 2025-02-13 DIAGNOSIS — Z87.01 HISTORY OF PNEUMONIA: Primary | ICD-10-CM

## 2025-02-13 DIAGNOSIS — Z87.01 HISTORY OF PNEUMONIA: ICD-10-CM

## 2025-02-13 PROCEDURE — 71046 X-RAY EXAM CHEST 2 VIEWS: CPT

## 2025-02-13 NOTE — PROGRESS NOTES
Primary Care Provider  Jarek Kong Jr., MD   Referring Provider  No ref. provider found      Patient Complaint  Follow-up (4 Months) and Chronic cough      Subjective          Nelson Wang presents to [facility]      History of Presenting Illness  Nelson Wang is a 80 y.o. male with history of respiratory failure, ESRD on HD, recent hospitalization here for follow-up.    Patient is here today with his daughter.  He is doing well.  He is currently on room air with SpO2 96%.  He has a pulse oximeter at home and stays above 90%  He is tolerating normal diet  He is able to ambulate some  He has dialysis Monday Wednesday Friday  No fever, chills, sputum production  He continues Brovana, Pulmicort at home.  He has Breztri as well.  I have personally reviewed the review of systems, past family, social, medical and surgical histories; and agree with their findings.    Review of Systems  Constitutional:  No fever. No chills. No weakness.  ENT:  No sore throat, URI symptoms.   Cardiovascular:  No chest pain. No palpitations. No lower extremity edema.  Respiratory:  No shortness of breath, cough, pleuritic chest pain. No hemoptysis. No dyspnea.   GI:  Normal appetite. No nausea, vomiting, diarrhea. No melena.  Musculoskeletal:  No arthralgias or myalgias.  Neurologic:  No weakness    Family History   Problem Relation Age of Onset    Diabetes Mother     Heart disease Father     Colon cancer Sister 77    Cancer Sister 35    Diabetes Sister     Diabetes Brother     Sleep apnea Paternal Aunt     Heart disease Paternal Uncle     Sleep apnea Daughter     Malig Hyperthermia Neg Hx         Social History     Socioeconomic History    Marital status:    Tobacco Use    Smoking status: Former     Current packs/day: 0.00     Average packs/day: 1 pack/day for 10.0 years (10.0 ttl pk-yrs)     Types: Cigarettes     Start date: 1990     Quit date: 2000     Years since  quittin.1     Passive exposure: Past    Smokeless tobacco: Never    Tobacco comments:     quit 25 years ago, chews nicotine gum in past   Vaping Use    Vaping status: Former   Substance and Sexual Activity    Alcohol use: Not Currently     Comment: 1 DRINK/DAY-rarely    Drug use: Never    Sexual activity: Defer        Past Medical History:   Diagnosis Date    Abnormal stress test     Anemia     IRON INFUSIONS WITH DIALYSIS    Anesthesia     WITH HIP REPLACEMENT DAUGHTER BELIEVES HAD SVT 2000    Ankle pain, right     Anxiety and depression     Arthritis     CKD (chronic kidney disease), stage IV     DIALYSIS [id number] SHILEY IN RIGHT CHEST    Diabetes     GERD (gastroesophageal reflux disease)     Gout     High cholesterol     History of peritoneal dialysis     HL (hearing loss)     Hyperkalemia     Hypertension     Hypothyroidism     Insomnia     Night terrors     Psoriasis     Psoriasis     Sleep apnea     Sleep walking     Thrombocytopenia     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    Vitiligo         Immunization History   Administered Date(s) Administered    Arexvy (RSV, Adults 60+ yrs) 2023    COVID-19 (PFIZER) 12YRS+ (COMIRNATY) 2023, 2024    COVID-19 (PFIZER) BIVALENT 12+YRS 2022, 2023    COVID-19 (PFIZER) Purple Cap Monovalent 2021, 10/04/2021, 2022    Fluad Quad 65+ 2023    Fluzone High-Dose 65+YRS 2024    Fluzone High-Dose 65+yrs 2021    Hepatitis B 2 Dose Vaccine Heplisav-B 2023, 2023, 2023, 2023    Influenza, Unspecified 2021, 2022       No Known Allergies       Current Outpatient Medications:     atorvastatin (LIPITOR) 40 MG tablet, Take 1 tablet by mouth Daily., Disp: 90 tablet, Rfl: 1    benzonatate (Tessalon Perles) 100 MG capsule, Take 1 capsule by mouth 3 (Three) Times a Day As Needed for Cough., Disp: 60 capsule, Rfl: 0    famotidine (Pepcid) 20 MG tablet, Take 1 tablet by mouth Daily.,  "Disp: 90 tablet, Rfl: 2    hydrocortisone (CORTEF) 10 MG tablet, Take 1 tablet by mouth 2 (Two) Times a Day With Meals for 30 days., Disp: 60 tablet, Rfl: 0    levothyroxine (SYNTHROID, LEVOTHROID) 175 MCG tablet, Take 1 tablet by mouth Daily., Disp: , Rfl:     Methoxy PEG-Epoetin Beta (MIRCERA IJ), 200 mcg., Disp: , Rfl:     midodrine (PROAMATINE) 5 MG tablet, Take 1 tablet by mouth Daily As Needed (prior to dialysis)., Disp: , Rfl:     QUEtiapine (SEROquel) 25 MG tablet, Take 0.5 tablets by mouth Every Night for 30 days., Disp: , Rfl:     azithromycin (ZITHROMAX) 250 MG tablet, Take 1 tablet by mouth Daily. (Patient not taking: Reported on 2/13/2025), Disp: , Rfl:     budesonide (PULMICORT) 0.5 MG/2ML nebulizer solution, Take 2 mL by nebulization 2 (Two) Times a Day for 30 days., Disp: 120 mL, Rfl: 0    ipratropium-albuterol (DUO-NEB) 0.5-2.5 mg/3 ml nebulizer, Take 3 mL by nebulization 2 (Two) Times a Day for 30 days., Disp: 180 mL, Rfl: 0     Objective     Vital Signs:   /84 (BP Location: Right arm, Patient Position: Sitting, Cuff Size: Adult)   Pulse 79   Temp 97.5 °F (36.4 °C) (Temporal)   Resp 18   Ht 162.6 cm (64\")   Wt 62.6 kg (138 lb)   SpO2 96%   BMI 23.69 kg/m²     Physical Exam  Vital Signs Reviewed   WDWN, Alert, NAD. In wheelchair.   HEENT:  EOMI.  nares clear  Neck:  Supple, no JVD, no thyromegaly  Chest:  mild L crackles noted to auscultation, no wheezes, no work of breathing noted room air  CV: RRR, no M/G/R, pulses 2+, equal.  Abd:  Soft, NT, ND, +BS  EXT:  no clubbing, no cyanosis, no edema  Neuro:  A&Ox3, CN grossly intact, no focal deficits.  Skin: No rashes or lesions noted       Result Review :   I have personally reviewed patient's labs and images.              Patient Active Problem List   Diagnosis    Essential hypertension    Bradycardia, sinus    Anemia due to chronic kidney disease    Hypothyroidism    Idiopathic gout    Proteinuria    Psoriasis    Thrombocytopenia    Type " 2 diabetes mellitus without complication    CKD (chronic kidney disease), stage IV    Hyperlipidemia    Monoclonal gammopathy of unknown significance (MGUS)    Stage 5 chronic kidney disease    Chronic gout without tophus    Peritoneal dialysis catheter dysfunction    Medicare annual wellness visit, subsequent    Chronic cough    Peritonitis associated with peritoneal dialysis    Recurrent pneumonia    Acute on chronic respiratory failure with hypoxia    ESRD (end stage renal disease)    Aspiration pneumonitis    Sepsis    Type 2 diabetes mellitus, with long-term current use of insulin    GERD without esophagitis    Immunosuppression due to drug therapy    Bipolar disorder    Chronic respiratory failure with hypoxia    Shortness of breath    Abnormal stress test    ESRD on dialysis    Abnormal chest CT    Encounter for screening for malignant neoplasm of colon    History of colon polyps    Family history of colon cancer    Intractable pain    Abdominal pain    ESRD (end stage renal disease) on dialysis    AMS (altered mental status)    Hallucinations    LUQ pain    Discitis    Metabolic encephalopathy    Aspiration pneumonia    Discitis of lumbar region    Severe malnutrition    Dementia    Unspecified severe protein-calorie malnutrition    Hypoxia    Gram-positive bacteremia    Moderate protein-calorie malnutrition       Impression and Plan    Pleural effusion  History of hypoxic and hypercarbic respiratory failure  History of CO2 narcosis  History of toxic/metabolic encephalopathy  ESRD on HD  Heart failure with preserved EF    -Patient is doing well, not needing oxygen  -Continue Brovana, Pulmicort nebulizers  -Continue Breztri twice daily  -Will repeat chest x-ray to ensure resolution of previous left-sided pneumonia  -Continue CPAP at night  -Follow-up in 3 to 4 months or earlier as needed    Smoking status: Reviewed  Vaccination status: Patient is up-to-date with his vaccinations at this time  Medications  personally reviewed    Follow Up   No follow-ups on file.  Patient was given instructions and counseling regarding his condition or for health maintenance advice. Please see specific information pulled into the AVS if appropriate.

## 2025-02-28 ENCOUNTER — DOCUMENTATION (OUTPATIENT)
Dept: INTERNAL MEDICINE | Facility: CLINIC | Age: 80
End: 2025-02-28
Payer: MEDICARE

## 2025-03-20 ENCOUNTER — LAB (OUTPATIENT)
Facility: HOSPITAL | Age: 80
End: 2025-03-20
Payer: MEDICARE

## 2025-03-20 ENCOUNTER — OFFICE VISIT (OUTPATIENT)
Dept: INTERNAL MEDICINE | Facility: CLINIC | Age: 80
End: 2025-03-20
Payer: MEDICARE

## 2025-03-20 VITALS
WEIGHT: 141.4 LBS | BODY MASS INDEX: 24.14 KG/M2 | DIASTOLIC BLOOD PRESSURE: 63 MMHG | SYSTOLIC BLOOD PRESSURE: 124 MMHG | HEART RATE: 69 BPM | TEMPERATURE: 98.2 F | OXYGEN SATURATION: 95 % | HEIGHT: 64 IN

## 2025-03-20 DIAGNOSIS — R41.3 MEMORY LOSS: ICD-10-CM

## 2025-03-20 DIAGNOSIS — M79.604 RIGHT LEG PAIN: ICD-10-CM

## 2025-03-20 DIAGNOSIS — Z87.39 HISTORY OF OSTEOMYELITIS: Primary | ICD-10-CM

## 2025-03-20 DIAGNOSIS — R53.1 GENERALIZED WEAKNESS: ICD-10-CM

## 2025-03-20 DIAGNOSIS — Z87.39 HISTORY OF OSTEOMYELITIS: ICD-10-CM

## 2025-03-20 LAB
BASOPHILS # BLD AUTO: 0.02 10*3/MM3 (ref 0–0.2)
BASOPHILS NFR BLD AUTO: 0.3 % (ref 0–1.5)
CRP SERPL-MCNC: 0.46 MG/DL (ref 0–0.5)
DEPRECATED RDW RBC AUTO: 48.9 FL (ref 37–54)
EOSINOPHIL # BLD AUTO: 0.09 10*3/MM3 (ref 0–0.4)
EOSINOPHIL NFR BLD AUTO: 1.5 % (ref 0.3–6.2)
ERYTHROCYTE [DISTWIDTH] IN BLOOD BY AUTOMATED COUNT: 14.5 % (ref 12.3–15.4)
ERYTHROCYTE [SEDIMENTATION RATE] IN BLOOD: 29 MM/HR (ref 0–20)
HCT VFR BLD AUTO: 39.5 % (ref 37.5–51)
HGB BLD-MCNC: 12.3 G/DL (ref 13–17.7)
IMM GRANULOCYTES # BLD AUTO: 0.01 10*3/MM3 (ref 0–0.05)
IMM GRANULOCYTES NFR BLD AUTO: 0.2 % (ref 0–0.5)
LYMPHOCYTES # BLD AUTO: 1.7 10*3/MM3 (ref 0.7–3.1)
LYMPHOCYTES NFR BLD AUTO: 29.1 % (ref 19.6–45.3)
MCH RBC QN AUTO: 28.7 PG (ref 26.6–33)
MCHC RBC AUTO-ENTMCNC: 31.1 G/DL (ref 31.5–35.7)
MCV RBC AUTO: 92.3 FL (ref 79–97)
MONOCYTES # BLD AUTO: 0.58 10*3/MM3 (ref 0.1–0.9)
MONOCYTES NFR BLD AUTO: 9.9 % (ref 5–12)
NEUTROPHILS NFR BLD AUTO: 3.44 10*3/MM3 (ref 1.7–7)
NEUTROPHILS NFR BLD AUTO: 59 % (ref 42.7–76)
NRBC BLD AUTO-RTO: 0 /100 WBC (ref 0–0.2)
PLATELET # BLD AUTO: 135 10*3/MM3 (ref 140–450)
PMV BLD AUTO: 10.6 FL (ref 6–12)
RBC # BLD AUTO: 4.28 10*6/MM3 (ref 4.14–5.8)
VIT B12 BLD-MCNC: 561 PG/ML (ref 211–946)
WBC NRBC COR # BLD AUTO: 5.84 10*3/MM3 (ref 3.4–10.8)

## 2025-03-20 PROCEDURE — 87040 BLOOD CULTURE FOR BACTERIA: CPT

## 2025-03-20 PROCEDURE — 86140 C-REACTIVE PROTEIN: CPT

## 2025-03-20 PROCEDURE — 85025 COMPLETE CBC W/AUTO DIFF WBC: CPT

## 2025-03-20 PROCEDURE — 82607 VITAMIN B-12: CPT

## 2025-03-20 PROCEDURE — 36415 COLL VENOUS BLD VENIPUNCTURE: CPT

## 2025-03-20 PROCEDURE — 85652 RBC SED RATE AUTOMATED: CPT

## 2025-03-20 RX ORDER — HYDROCORTISONE 10 MG/1
10 TABLET ORAL DAILY
COMMUNITY
Start: 2025-02-24

## 2025-03-20 NOTE — PROGRESS NOTES
"Chief Complaint  Back Pain (Osteomyelitis he was treated with antibiotic  / He has had a lot of back soreness ), Fever (Everyday at 4pm then goes away at 9pm ), requesting labs (Sed rate and CRP ), and Imaging Only (Wants an MRI )    Subjective          Nelson Wang presents to [facility]  Back Pain  This is a new problem. The pain is present in the thoracic spine and lumbar spine. The pain does not radiate. Associated symptoms include a fever (daily fevers at 4pm), leg pain and weakness. Pertinent negatives include no abdominal pain, bladder incontinence, bowel incontinence, chest pain, dysuria, headaches, numbness, paresis, paresthesias, pelvic pain, perianal numbness, tingling or weight loss.       History of Present Illness    Takes vitamin d and renal multivitamin   Does have GASTON but is not wearing CPAP     Had osteomyltits in October. Was hospitalized for 4 months.     Current Outpatient Medications   Medication Instructions    atorvastatin (LIPITOR) 40 mg, Oral, Daily    budesonide (PULMICORT) 0.5 mg, Nebulization, 2 Times Daily - RT    famotidine (PEPCID) 20 mg, Oral, Daily    hydrocortisone (CORTEF) 10 mg, Daily    levothyroxine (SYNTHROID, LEVOTHROID) 175 MCG tablet 1 tablet, Daily    Methoxy PEG-Epoetin Beta (MIRCERA IJ) 200 mcg    QUEtiapine (SEROQUEL) 12.5 mg, Oral, Nightly       The following portions of the patient's history were reviewed and updated as appropriate: allergies, current medications, past family history, past medical history, past social history, past surgical history, and problem list.    Objective   Vital Signs:   /63 (BP Location: Right arm, Patient Position: Sitting, Cuff Size: Adult)   Pulse 69   Temp 98.2 °F (36.8 °C) (Temporal)   Ht 162.6 cm (64\")   Wt 64.1 kg (141 lb 6.4 oz)   SpO2 95%   BMI 24.27 kg/m²     BP Readings from Last 3 Encounters:   03/20/25 124/63   02/13/25 172/84   01/30/25 145/86     Wt Readings from " Last 3 Encounters:   03/20/25 64.1 kg (141 lb 6.4 oz)   02/13/25 62.6 kg (138 lb)   02/11/25 61.2 kg (135 lb)     BMI is within normal parameters. No other follow-up for BMI required.     Physical Exam  Musculoskeletal:      Thoracic back: Bony tenderness present.      Lumbar back: Bony tenderness present.        Back:           Appearance: No acute distress, well-nourished  Head: normocephalic, atraumatic  Eyes: extraocular movements intact, no scleral icterus, no conjunctival injection  Ears, Nose, and Throat: external ears normal, nares patent, moist mucous membranes  Cardiovascular: regular rate and rhythm. no murmurs, rubs, or gallops. no edema  Respiratory: breathing comfortably, symmetric chest rise, clear to auscultation bilaterally. No wheezes, rales, or rhonchi.  Neuro: alert and oriented to time, place, and person. Normal gait  Psych: normal mood and affect     Physical Exam        Result Review :   The following data was reviewed by: BECKY Mcghee on 03/20/2025:  Common labs          2/26/2025    00:00 3/12/2025    00:00 3/20/2025    15:39   Common Labs   Potassium 5.2         WBC   5.84    Hemoglobin 11.9        35.7     11        33     12.3    Hematocrit   39.5    Platelets   135       Details          This result is from an external source.               Results         Office Visit with Shiraz Chambers MD (02/11/2025)     Office Visit with Domingo Bravo MD (01/30/2025)     Admission (Discharged) with Tyler Smith DO (01/11/2025)   HEMATOLOGY (03/12/2025)  IMMUNO CHEMISTRY (03/05/2025)  HEMATOLOGY (03/05/2025)  Spectrae Chemistry (03/05/2025)  SPECIAL CHEMISTRY (03/05/2025)  POST CHEMISTRY (03/05/2025)  HD KINETICS (03/05/2025)    Lab Results   Component Value Date    COVID19 Not Detected 01/13/2025    RSV Not Detected 01/13/2025    INR 1.07 01/17/2025    BILIRUBINUR Negative 10/27/2024            Assessment and Plan    Diagnoses and all orders for this visit:    1. History of  osteomyelitis (Primary)  -     Blood Culture - Blood,; Future  -     Blood Culture - Blood,; Future  -     CBC w AUTO Differential; Future  -     Sedimentation Rate; Future  -     C-reactive protein; Future  -     MRI Lumbar Spine Without Contrast; Future  -     MRI Thoracic Spine Without Contrast; Future    2. Memory loss  -     Vitamin B12; Future    3. Generalized weakness  -     Ambulatory Referral to Physical Therapy for Evaluation & Treatment    4. Right leg pain  -     Ambulatory Referral to Physical Therapy for Evaluation & Treatment        Assessment & Plan      Medications Discontinued During This Encounter   Medication Reason    benzonatate (Tessalon Perles) 100 MG capsule Historical Med - Therapy completed    azithromycin (ZITHROMAX) 250 MG tablet Historical Med - Therapy completed    ipratropium-albuterol (DUO-NEB) 0.5-2.5 mg/3 ml nebulizer Historical Med - Therapy completed    midodrine (PROAMATINE) 5 MG tablet Historical Med - Therapy completed        I spent 30 minutes caring for Nelson on this date of service. This time includes time spent by me in the following activities:preparing for the visit, reviewing tests, obtaining and/or reviewing a separately obtained history, performing a medically appropriate examination and/or evaluation , counseling and educating the patient/family/caregiver, ordering medications, tests, or procedures, documenting information in the medical record, and independently interpreting results and communicating that information with the patient/family/caregiver  Follow Up   No follow-ups on file.  Patient was given instructions and counseling regarding his condition or for health maintenance advice. Please see specific information pulled into the AVS if appropriate.       BECKY Mcghee  03/25/25  09:12 EDT      Patient or patient representative verbalized consent for the use of Ambient Listening during the visit with  BECKY Mcghee for chart  documentation. 3/25/2025  09:11 EDT

## 2025-03-21 ENCOUNTER — RESULTS FOLLOW-UP (OUTPATIENT)
Facility: HOSPITAL | Age: 80
End: 2025-03-21

## 2025-03-25 LAB
BACTERIA SPEC AEROBE CULT: NORMAL
BACTERIA SPEC AEROBE CULT: NORMAL

## 2025-04-01 ENCOUNTER — OFFICE VISIT (OUTPATIENT)
Dept: ORTHOPEDIC SURGERY | Facility: CLINIC | Age: 80
End: 2025-04-01
Payer: MEDICARE

## 2025-04-01 VITALS
HEART RATE: 83 BPM | WEIGHT: 141 LBS | HEIGHT: 64 IN | OXYGEN SATURATION: 91 % | SYSTOLIC BLOOD PRESSURE: 169 MMHG | BODY MASS INDEX: 24.07 KG/M2 | DIASTOLIC BLOOD PRESSURE: 65 MMHG

## 2025-04-01 DIAGNOSIS — M25.551 RIGHT HIP PAIN: Primary | ICD-10-CM

## 2025-04-01 DIAGNOSIS — S72.144D CLOSED NONDISPLACED INTERTROCHANTERIC FRACTURE OF RIGHT FEMUR WITH ROUTINE HEALING, SUBSEQUENT ENCOUNTER: ICD-10-CM

## 2025-04-01 NOTE — PROGRESS NOTES
"Chief Complaint  Pain and Follow-up of the Right Femur     Subjective      Nelson Wang presents to [facility] for a follow up for his right femur. He has been treating his right femur nondisplaced fracture conservatively. He presents today with his daughter and is ambulating today without any assistance. He is overall doing well and denies any pain to his right hip. He denies any new injury or falls since his last visit. . He is currently on dialysis.     No Known Allergies     Social History     Socioeconomic History    Marital status:    Tobacco Use    Smoking status: Former     Current packs/day: 0.00     Average packs/day: 1 pack/day for 10.0 years (10.0 ttl pk-yrs)     Types: Cigarettes     Start date:      Quit date:      Years since quittin.2     Passive exposure: Past    Smokeless tobacco: Never    Tobacco comments:     quit 25 years ago, chews nicotine gum in past   Vaping Use    Vaping status: Former   Substance and Sexual Activity    Alcohol use: Not Currently     Comment: 1 DRINK/DAY-rarely    Drug use: Never    Sexual activity: Defer        I reviewed the patient's chief complaint, history of present illness, review of systems, past medical history, surgical history, family history, social history, medications, and allergy list.     Review of Systems     Constitutional: Denies fevers, chills, weight loss  Cardiovascular: Denies chest pain, shortness of breath  Skin: Denies rashes, acute skin changes  Neurologic: Denies headache, loss of consciousness  MSK: right femurs pain       Vital Signs:   /65   Pulse 83   Ht 162.6 cm (64\")   Wt 64 kg (141 lb)   SpO2 91% Comment: Patients daughter stated he has had pneumonia and just got off antibiotics.Also stated patients normal is between 92-96 typically. Patients daughter stated she was going to contact PCP for a chest xray and would notifiy them.  notified in office today  BMI 24.20 kg/m²      "       Ortho Exam    Physical Exam  General:Alert. No acute distress   Right lower extremity: flexion  to 85 degrees, external rotation  to 25 degrees, internal rotation to 15 degrees, can hold a straight leg raise, well healed surgicial incision, neurovascularly intact, positive  pulses, positive EHL, FHL, GS, and TA. Sensation intact to all 5 nerves of the foot.      Procedures    X-Ray Report:  Right hip X-Ray  Indication: Evaluation of right hip pain   AP/Lateral view(s)  Findings: intact modular hip arthroplasty, no signs of hardware loosening or subsidence, healing nondisplaced intertrochanteric femur fracture with callous formation    Prior studies available for comparison: yes       Imaging Results (Most Recent)       Procedure Component Value Units Date/Time    XR Hip With or Without Pelvis 2 - 3 View Right [[id number]] Resulted: 04/01/25 1549     Updated: 04/01/25 1551             Result Review :       No results found.           Assessment and Plan     Diagnoses and all orders for this visit:    1. Right hip pain (Primary)  -     XR Hip With or Without Pelvis 2 - 3 View Right    2. Closed nondisplaced intertrochanteric fracture of right femur with routine healing, subsequent encounter      The patient presents here today for a follow up for his right femur. X-rays were obtained in the office today and these were reviewed today.     He will continue outpatient physical therapy and current medications for pain control.     He can continue activities as tolerated.     Call or return if worsening symptoms.    Follow Up     8 weeks     Will obtain X-Rays of right hip at next visit.       Patient was given instructions and counseling regarding his condition or for health maintenance advice. Please see specific information pulled into the AVS if appropriate.     Scribed for Shiraz Chambers MD by Alexandria Bae.  04/01/25   15:57 EDT    I have personally performed the services described in this document as  scribed by the above individual and it is both accurate and complete. Shiraz Chambers MD 04/02/25

## 2025-04-02 ENCOUNTER — NURSE TRIAGE (OUTPATIENT)
Dept: CALL CENTER | Facility: HOSPITAL | Age: 80
End: 2025-04-02
Payer: MEDICARE

## 2025-04-02 NOTE — TELEPHONE ENCOUNTER
HUB call having sob Sat 96%, Mild sob ,sat is good,  no fever,has not had breathing tx couple days, has started those now again,  seems to be helping, he is a dialysis pt. no distress, no swelling, no fever. Had pneumonia recently, had blood work last week,  all was good, she states. Daughter asking for chest xray. He was in hospital for 6 mths in October of last year, daughter is just concerned. Pt. In no distress, she is asking to be sent to the office for order for chest xray. Soft transfer to officeDaughter asking for a call back  from the office please. [phone]  Reason for Disposition   [1] MILD longstanding difficulty breathing AND [2]  SAME as normal    Additional Information   Negative: SEVERE difficulty breathing (e.g., struggling for each breath, speaks in single words)   Negative: [1] Breathing stopped AND [2] hasn't returned   Negative: Choking on something   Negative: Bluish (or gray) lips or face now   Negative: Difficult to awaken or acting confused (e.g., disoriented, slurred speech)   Negative: Passed out (i.e., lost consciousness, collapsed and was not responding)   Negative: Wheezing started suddenly after medicine, an allergic food or bee sting   Negative: Stridor (harsh sound while breathing in)   Negative: Slow, shallow and weak breathing   Negative: Sounds like a life-threatening emergency to the triager   Negative: Chest pain   Negative: [1] Wheezing (high pitched whistling sound) AND [2] previous asthma attacks or use of asthma medicines   Negative: [1] Difficulty breathing AND [2] only present when coughing   Negative: [1] Difficulty breathing AND [2] only from stuffy or runny nose   Negative: [1] Difficulty breathing AND [2] within 14 days of COVID-19 Exposure   Negative: [1] MODERATE difficulty breathing (e.g., speaks in phrases, SOB even at rest, pulse 100-120) AND [2] NEW-onset or WORSE than normal   Negative: Oxygen level (e.g., pulse oximetry) 90 percent or lower   Negative:  "Wheezing can be heard across the room   Negative: Drooling or spitting out saliva (because can't swallow)   Negative: History of prior \"blood clot\" in leg or lungs (i.e., deep vein thrombosis, pulmonary embolism)   Negative: History of inherited increased risk of blood clots (e.g., Factor 5 Leiden, Anti-thrombin 3, Protein C or Protein S deficiency, Prothrombin mutation)   Negative: Major surgery in the past month   Negative: Hip or leg fracture (broken bone) in past month (or had cast on leg or ankle in past month)   Negative: Illness requiring prolonged bedrest in past month (e.g., immobilization, long hospital stay)   Negative: Long-distance travel in past month (e.g., car, bus, train, plane; with trip lasting 6 or more hours)   Negative: Cancer treatment in past six months (or has cancer now)   Negative: Extra heartbeats, irregular heart beating, or heart is beating very fast  (i.e., \"palpitations\")   Negative: Fever > 103 F (39.4 C)   Negative: [1] Fever > 101 F (38.3 C) AND [2] age > 60 years   Negative: [1] Fever > 100.0 F (37.8 C) AND [2] bedridden (e.g., CVA, chronic illness, recovering from surgery)   Negative: [1] Fever > 100.0 F (37.8 C) AND [2] diabetes mellitus or weak immune system (e.g., HIV positive, cancer chemo, splenectomy, organ transplant, chronic steroids)   Negative: [1] Periods where breathing stops and then resumes normally AND [2] bedridden (e.g., CVA)   Negative: Pregnant or postpartum (from 0 to 6 weeks after delivery)   Negative: Patient sounds very sick or weak to the triager   Negative: [1] MILD difficulty breathing (e.g., minimal/no SOB at rest, SOB with walking, pulse <100) AND [2] NEW-onset or WORSE than normal   Negative: [1] Longstanding difficulty breathing (e.g., CHF, COPD, emphysema) AND [2] WORSE than normal   Negative: [1] Longstanding difficulty breathing AND [2] not responding to usual therapy   Negative: Continuous (nonstop) coughing interferes with work, school, or " "sleeping   Negative: Oxygen level (e.g., pulse oximetry) 91 to 94 percent   Negative: [1] MODERATE longstanding difficulty breathing (e.g., speaks in phrases, SOB even at rest, pulse 100-120) AND [2] SAME as normal    Answer Assessment - Initial Assessment Questions  1. RESPIRATORY STATUS: \"Describe your breathing?\" (e.g., wheezing, shortness of breath, unable to speak, severe coughing)       Sob with activity mild  2. ONSET: \"When did this breathing problem begin?\"       Lately on and off  3. PATTERN \"Does the difficult breathing come and go, or has it been constant since it started?\"       Comes and goes  4. SEVERITY: \"How bad is your breathing?\" (e.g., mild, moderate, severe)     - MILD: No SOB at rest, mild SOB with walking, speaks normally in sentences, can lie down, no retractions, pulse < 100.     - MODERATE: SOB at rest, SOB with minimal exertion and prefers to sit, cannot lie down flat, speaks in phrases, mild retractions, audible wheezing, pulse 100-120.     - SEVERE: Very SOB at rest, speaks in single words, struggling to breathe, sitting hunched forward, retractions, pulse > 120       mild  5. RECURRENT SYMPTOM: \"Have you had difficulty breathing before?\" If Yes, ask: \"When was the last time?\" and \"What happened that time?\"       Yes has had it  6. CARDIAC HISTORY: \"Do you have any history of heart disease?\" (e.g., heart attack, angina, bypass surgery, angioplasty)       no  7. LUNG HISTORY: \"Do you have any history of lung disease?\"  (e.g., pulmonary embolus, asthma, emphysema)      Recent pneumonia  8. CAUSE: \"What do you think is causing the breathing problem?\"       Maybe pneumonia  9. OTHER SYMPTOMS: \"Do you have any other symptoms? (e.g., dizziness, runny nose, cough, chest pain, fever)      no  10. O2 SATURATION MONITOR:  \"Do you use an oxygen saturation monitor (pulse oximeter) at home?\" If Yes, ask: \"What is your reading (oxygen level) today?\" \"What is your usual oxygen saturation reading?\" " "(e.g., 95%)        96%  11. PREGNANCY: \"Is there any chance you are pregnant?\" \"When was your last menstrual period?\"        no  12. TRAVEL: \"Have you traveled out of the country in the last month?\" (e.g., travel history, exposures)        no    Protocols used: Breathing Difficulty-ADULT-AH    "

## 2025-04-02 NOTE — TELEPHONE ENCOUNTER
Called patient daughter and let her know that we don't have a provider in office this week, that we recommend UC or ER   Daughter was either wanting him to be seen or just place a Xray order.   She stated that she will take him to ER and get Xray

## 2025-04-04 ENCOUNTER — HOSPITAL ENCOUNTER (OUTPATIENT)
Facility: HOSPITAL | Age: 80
Discharge: HOME | End: 2025-04-04
Payer: MEDICARE

## 2025-04-04 ENCOUNTER — OFFICE VISIT (OUTPATIENT)
Dept: PULMONOLOGY | Facility: CLINIC | Age: 80
End: 2025-04-04
Payer: MEDICARE

## 2025-04-04 VITALS
WEIGHT: 143 LBS | SYSTOLIC BLOOD PRESSURE: 152 MMHG | HEART RATE: 80 BPM | BODY MASS INDEX: 24.41 KG/M2 | HEIGHT: 64 IN | OXYGEN SATURATION: 93 % | DIASTOLIC BLOOD PRESSURE: 107 MMHG | TEMPERATURE: 98.3 F | RESPIRATION RATE: 16 BRPM

## 2025-04-04 DIAGNOSIS — F17.201 TOBACCO ABUSE, IN REMISSION: ICD-10-CM

## 2025-04-04 DIAGNOSIS — R50.9 SUBJECTIVE FEVER: ICD-10-CM

## 2025-04-04 DIAGNOSIS — J98.4 RESTRICTIVE LUNG DISEASE: ICD-10-CM

## 2025-04-04 DIAGNOSIS — Z99.2 ESRD ON DIALYSIS: ICD-10-CM

## 2025-04-04 DIAGNOSIS — J18.9 RECURRENT PNEUMONIA: ICD-10-CM

## 2025-04-04 DIAGNOSIS — R06.09 DYSPNEA ON EXERTION: ICD-10-CM

## 2025-04-04 DIAGNOSIS — R05.3 CHRONIC COUGH: ICD-10-CM

## 2025-04-04 DIAGNOSIS — J90 PLEURAL EFFUSION: ICD-10-CM

## 2025-04-04 DIAGNOSIS — I51.89 GRADE I DIASTOLIC DYSFUNCTION: ICD-10-CM

## 2025-04-04 DIAGNOSIS — N18.6 ESRD ON DIALYSIS: ICD-10-CM

## 2025-04-04 DIAGNOSIS — G47.33 OSA (OBSTRUCTIVE SLEEP APNEA): ICD-10-CM

## 2025-04-04 DIAGNOSIS — J90 PLEURAL EFFUSION: Primary | ICD-10-CM

## 2025-04-04 PROCEDURE — 3080F DIAST BP >= 90 MM HG: CPT

## 2025-04-04 PROCEDURE — 1160F RVW MEDS BY RX/DR IN RCRD: CPT

## 2025-04-04 PROCEDURE — 3077F SYST BP >= 140 MM HG: CPT

## 2025-04-04 PROCEDURE — 1159F MED LIST DOCD IN RCRD: CPT

## 2025-04-04 PROCEDURE — 99214 OFFICE O/P EST MOD 30 MIN: CPT

## 2025-04-04 PROCEDURE — 71046 X-RAY EXAM CHEST 2 VIEWS: CPT

## 2025-04-04 RX ORDER — CARVEDILOL 12.5 MG/1
1 TABLET ORAL
COMMUNITY
Start: 2025-02-24

## 2025-04-04 RX ORDER — FLUTICASONE PROPIONATE 50 MCG
2 SPRAY, SUSPENSION (ML) NASAL DAILY
COMMUNITY
Start: 2025-02-24

## 2025-04-04 RX ORDER — SEVELAMER CARBONATE 0.8 G/1
1 POWDER, FOR SUSPENSION ORAL 3 TIMES DAILY
COMMUNITY
Start: 2025-02-24 | End: 2026-03-13

## 2025-04-04 RX ORDER — SEVELAMER CARBONATE 800 MG/1
1 TABLET, FILM COATED ORAL 3 TIMES DAILY
COMMUNITY
Start: 2025-03-25 | End: 2026-03-24

## 2025-04-04 RX ORDER — GUAIFENESIN 600 MG/1
1 TABLET, EXTENDED RELEASE ORAL DAILY
COMMUNITY
Start: 2025-02-24

## 2025-04-04 NOTE — PROGRESS NOTES
Primary Care Provider  Jarek Kong Jr., MD   Referring Provider  No ref. provider found      Patient Complaint  Follow-up, Shortness of Breath, and Cough (Productive)      Subjective          Nelson Wang presents to [facility]INE    Patient or patient representative verbalized consent for the use of Ambient Listening during the visit with  BECKY Mcdaniels for chart documentation. 4/4/2025  15:11 EDT    History of Presenting Illness  Nelson Wang is a 80 y.o. male patient of Dr. Espinal with history of hypoxic and hypercapnic respiratory failure, history of pleural effusion, GASTON, ESRD on dialysis, and tobacco use in remission, here for acute visit.    History of Present Illness  The patient presents for evaluation of shortness of breath, cough, and intermittent fevers.  He is accompanied by his daughter. He has been experiencing dyspnea during routine activities such as bathing and dressing, a symptom that has gradually worsened over the past month or so. He is uncertain about his current dry weight. He is currently undergoing physical therapy 3 times a week and has completed occupational therapy. He also reports mild tenderness in the lower lung fields bilaterally. He takes Mucinex twice daily. He has been using Breztri twice a day, will sometimes use Brovana/Pulmicort if not using Breztri. He has oxygen at home, which he does not need to use often, and his oxygen saturation levels are typically in the low 90s. He does not use his CPAP. This morning, he experienced a coughing episode with expectoration, although the color of the sputum was not observed as it was followed.  He has been experiencing subjective fevers in the evenings for the past week or so.  He recently completed courses of azithromycin and Augmentin but the fevers continued.  Blood cultures, CBC, and BMP were ordered by his PCPs office on 03/20/2025, no growth of blood cultures. He has  a recent history of adrenal crisis and sepsis, characterized by shortness of breath followed by a fever spike and bacteremia. He had a second ICU admission due to delirium and hypotension, during which his cortisol levels were found to be low. He has an appointment with endocrinology on 2025. He is currently going to dialysis 4 days a week and is retraining for home dialysis. His hemoglobin level is usually around 10.    Pulmonary Meds  Brovana  Pulmicort  Breztri  DuoNebs    Pulmonary Equipment  Oxygen therapy  CPAP--not using      Family History   Problem Relation Age of Onset    Diabetes Mother     Heart disease Father     Colon cancer Sister 77    Cancer Sister 35    Diabetes Sister     Diabetes Brother     Sleep apnea Paternal Aunt     Heart disease Paternal Uncle     Sleep apnea Daughter     Malig Hyperthermia Neg Hx         Social History     Socioeconomic History    Marital status:    Tobacco Use    Smoking status: Former     Current packs/day: 0.00     Average packs/day: 1 pack/day for 10.0 years (10.0 ttl pk-yrs)     Types: Cigarettes     Start date:      Quit date:      Years since quittin.2     Passive exposure: Past    Smokeless tobacco: Never    Tobacco comments:     quit 25 years ago, chews nicotine gum in past   Vaping Use    Vaping status: Former   Substance and Sexual Activity    Alcohol use: Not Currently     Comment: 1 DRINK/DAY-rarely    Drug use: Never    Sexual activity: Defer        Past Medical History:   Diagnosis Date    Abnormal stress test     Anemia     IRON INFUSIONS WITH DIALYSIS    Anesthesia     WITH HIP REPLACEMENT DAUGHTER BELIEVES HAD SVT     Ankle pain, right     Anxiety and depression     Arthritis     CKD (chronic kidney disease), stage IV     DIALYSIS [id number] SHILEY IN RIGHT CHEST    Diabetes     GERD (gastroesophageal reflux disease)     Gout     High cholesterol     History of peritoneal dialysis     HL (hearing loss)      Hyperkalemia     Hypertension     Hypothyroidism     Insomnia     Night terrors     Psoriasis     Psoriasis     Sleep apnea     Sleep walking     Thrombocytopenia     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    Vitiligo         Immunization History   Administered Date(s) Administered    Arexvy (RSV, Adults 60+ yrs) 12/29/2023    COVID-19 (PFIZER) 12YRS+ (COMIRNATY) 12/06/2023, 09/18/2024    COVID-19 (PFIZER) BIVALENT 12+YRS 09/19/2022, 08/21/2023    COVID-19 (PFIZER) Purple Cap Monovalent 08/22/2021, 10/04/2021, 09/30/2022    Fluad Quad 65+ 08/21/2023    Fluzone High-Dose 65+YRS 09/12/2024    Fluzone High-Dose 65+yrs 08/22/2021    Hepatitis B 2 Dose Vaccine Heplisav-B 04/18/2023, 05/16/2023, 07/07/2023, 08/02/2023    Influenza, Unspecified 08/22/2021, 09/30/2022       No Known Allergies       Current Outpatient Medications:     allopurinol (ZYLOPRIM) 300 MG tablet, Take 1 tablet by mouth Daily., Disp: 90 tablet, Rfl: 2    atorvastatin (LIPITOR) 40 MG tablet, Take 1 tablet by mouth Daily., Disp: 90 tablet, Rfl: 1    Budeson-Glycopyrrol-Formoterol (Breztri Aerosphere) 160-9-4.8 MCG/ACT aerosol inhaler, Inhale 2 puffs 2 (Two) Times a Day., Disp: 10.7 g, Rfl: 10    carvedilol (Coreg) 12.5 MG tablet, Take 1 tablet by mouth., Disp: , Rfl:     donepezil (ARICEPT) 5 MG tablet, Take 1 tablet by mouth every night at bedtime., Disp: 90 tablet, Rfl: 1    famotidine (Pepcid) 20 MG tablet, Take 1 tablet by mouth Daily., Disp: 90 tablet, Rfl: 2    famotidine (Pepcid) 20 MG tablet, Take 1 tablet by mouth Daily., Disp: 90 tablet, Rfl: 1    fluticasone (Flonase Allergy Relief) 50 MCG/ACT nasal spray, Administer 2 sprays into the nostril(s) as directed by provider Daily., Disp: , Rfl:     guaiFENesin (Mucinex) 600 MG 12 hr tablet, Take 1 tablet by mouth Daily., Disp: , Rfl:     hydrocortisone (CORTEF) 10 MG tablet, Take 1 tablet by mouth Daily., Disp: , Rfl:     hydrocortisone (CORTEF) 10 MG tablet, Take 1 tablet by mouth 2 (Two) Times a  "Day With Meals., Disp: 60 tablet, Rfl: 0    levothyroxine (SYNTHROID, LEVOTHROID) 175 MCG tablet, Take 1 tablet by mouth Daily., Disp: , Rfl:     levothyroxine (SYNTHROID, LEVOTHROID) 175 MCG tablet, Take 1 tablet by mouth Daily., Disp: 90 tablet, Rfl: 0    Methoxy PEG-Epoetin Beta (MIRCERA IJ), 200 mcg., Disp: , Rfl:     QUEtiapine (SEROquel) 25 MG tablet, Take 0.5 tablets by mouth Every Night for 30 days., Disp: , Rfl:     QUEtiapine (SEROquel) 25 MG tablet, Take 1 tablet by mouth 2 times a day as needed for agitation and 2 tablets by mouth at bedtime for insomnia, Disp: 120 tablet, Rfl: 2    QUEtiapine (SEROquel) 50 MG tablet, Take 1/2 tablet by mouth 2 times a day as needed for agitation then take 1 tablet by mouth at bedtime for insomnia, Disp: 90 tablet, Rfl: 1    sevelamer (RENVELA) 0.8 g pack packet, 1 packet 3 (Three) Times a Day., Disp: , Rfl:     sevelamer (RENVELA) 800 MG tablet, 1 tablet 3 (Three) Times a Day., Disp: , Rfl:     sodium zirconium cyclosilicate (Lokelma) 5 g packet, Take 5 g by mouth Every Other Day., Disp: 15 each, Rfl: 3    traZODone (DESYREL) 50 MG tablet, Take 1 tablet by mouth every night at bedtime., Disp: 90 tablet, Rfl: 1    amoxicillin-clavulanate (AUGMENTIN) 875-125 MG per tablet, Take 1 tablet by mouth Every 12 (Twelve) Hours. (Patient not taking: Reported on 4/4/2025), Disp: , Rfl:     budesonide (PULMICORT) 0.5 MG/2ML nebulizer solution, Take 2 mL by nebulization 2 (Two) Times a Day for 30 days. (Patient not taking: Reported on 4/4/2025), Disp: 120 mL, Rfl: 0     Objective     Vital Signs:   BP (!) 152/107 (BP Location: Left arm, Patient Position: Sitting, Cuff Size: Adult)   Pulse 80   Temp 98.3 °F (36.8 °C) (Temporal)   Resp 16   Ht 162.6 cm (64\")   Wt 64.9 kg (143 lb)   SpO2 93% Comment: room air  BMI 24.55 kg/m²     Physical Exam  Constitutional:       General: He is not in acute distress.     Appearance: Normal appearance. He is normal weight. He is not " ill-appearing.   HENT:      Right Ear: Tympanic membrane and ear canal normal.      Left Ear: Tympanic membrane and ear canal normal.      Nose: Nose normal.      Mouth/Throat:      Mouth: Mucous membranes are moist.      Pharynx: Oropharynx is clear.   Cardiovascular:      Rate and Rhythm: Normal rate and regular rhythm.      Pulses: Normal pulses.      Heart sounds: Normal heart sounds.   Pulmonary:      Effort: Pulmonary effort is normal. No respiratory distress.      Breath sounds: No stridor. No wheezing, rhonchi or rales.      Comments: Diminished throughout  Musculoskeletal:         General: No swelling. Normal range of motion.      Cervical back: Normal range of motion and neck supple.      Right lower leg: No edema.      Left lower leg: No edema.   Skin:     General: Skin is warm and dry.   Neurological:      General: No focal deficit present.      Mental Status: He is alert and oriented to person, place, and time.      Motor: No weakness.   Psychiatric:         Mood and Affect: Mood normal.         Behavior: Behavior normal.         Result Review :   I have personally reviewed patient's labs and images.  I also reviewed Dr. Espinal's last progress note 2/13/2025.    Results    -CXR 2/14/2025 showed elevated right hemidiaphragm with right basilar airspace opacity, likely representing atelectasis.  There were chronic interstitial opacities likely related to chronic lung changes.            Diagnoses and all orders for this visit:    1. Pleural effusion (Primary)  -     Cancel: XR Chest 2 View; Future  -     XR Chest 2 View; Future    2. Restrictive lung disease    3. ESRD on dialysis    4. Recurrent pneumonia    5. Dyspnea on exertion    6. Subjective fever  -     Cancel: XR Chest 2 View; Future  -     Cortisol; Future  -     TSH; Future  -     T4 Total; Future  -     XR Chest 2 View; Future    7. GASTON (obstructive sleep apnea)    8. Chronic cough    9. Grade I diastolic dysfunction    10. Tobacco abuse, in  remission       Assessment & Plan  1. Shortness of breath.  The etiology of the dyspnea appears to be multifactorial. A stat chest x-ray will be ordered to rule out any potential fluid accumulation around the lungs. If the chest x-ray is inconclusive, a CT scan may be considered for further evaluation.    2. Cough.  He experienced a bout of coughing this morning and brought up some unspecified material. He is currently on Mucinex, which seems to help. No changes to his current regimen are recommended at this time.    3. Fever.  He reported feeling feverish last week, although no fever was documented. He completed a 7-day course of azithromycin and Augmentin, after which the feeling of fever in the evenings persisted.  Patient also reports chills. Blood cultures collected on March 20 showed no growth. Cortisol levels, TSH, and T4 will be checked to rule out any underlying issues.    4. Adrenal crisis.  Given his history of adrenal crisis, it is important to monitor his cortisol levels. A cortisol level test will be ordered.  Keep appointment with endocrinologist next month.    5. Anemia.  His hemoglobin level is currently around 10, based on recent dialysis readings.    -Follow-up with Dr. Espinal in about 6 weeks, may return sooner if needed    Smoking status: Reviewed  Vaccination status: Patient reports he is up-to-date with his flu, pneumonia, and Covid vaccines.  Patient is advised to continue to follow CDC recommendations such as social distancing wearing a mask and washing hands for at least 20 seconds.  Medications personally reviewed    Follow Up   No follow-ups on file.  Patient was given instructions and counseling regarding his condition or for health maintenance advice. Please see specific information pulled into the AVS if appropriate.

## 2025-04-07 ENCOUNTER — LAB (OUTPATIENT)
Dept: LAB | Facility: HOSPITAL | Age: 80
End: 2025-04-07
Payer: MEDICARE

## 2025-04-07 DIAGNOSIS — R50.9 SUBJECTIVE FEVER: ICD-10-CM

## 2025-04-07 LAB
CORTIS SERPL-MCNC: 7.45 MCG/DL
T4 SERPL-MCNC: 10.6 MCG/DL (ref 4.5–11.7)
TSH SERPL DL<=0.05 MIU/L-ACNC: 0.16 UIU/ML (ref 0.27–4.2)

## 2025-04-07 PROCEDURE — 84436 ASSAY OF TOTAL THYROXINE: CPT

## 2025-04-07 PROCEDURE — 84443 ASSAY THYROID STIM HORMONE: CPT

## 2025-04-07 PROCEDURE — 36415 COLL VENOUS BLD VENIPUNCTURE: CPT

## 2025-04-07 PROCEDURE — 82533 TOTAL CORTISOL: CPT

## 2025-04-14 ENCOUNTER — DOCUMENTATION (OUTPATIENT)
Dept: INTERNAL MEDICINE | Facility: CLINIC | Age: 80
End: 2025-04-14
Payer: MEDICARE

## 2025-04-21 ENCOUNTER — HOSPITAL ENCOUNTER (OUTPATIENT)
Dept: MRI IMAGING | Facility: HOSPITAL | Age: 80
Discharge: HOME | End: 2025-04-21
Payer: MEDICARE

## 2025-04-21 DIAGNOSIS — Z87.39 HISTORY OF OSTEOMYELITIS: ICD-10-CM

## 2025-04-21 PROCEDURE — 72148 MRI LUMBAR SPINE W/O DYE: CPT

## 2025-04-21 PROCEDURE — 72146 MRI CHEST SPINE W/O DYE: CPT

## 2025-04-25 NOTE — TELEPHONE ENCOUNTER
Spoke with patient and his daughter. Verified .     Made aware of results --     MRI Lumbar Spine Without Contrast   Old fracture, L3.   Multiple disc bulges. Degenerative disc disease    MRI Thoracic Spine Without Contrast   Small pleural effusion, incidental finding. Has CKD. Will attach his pulmonologist for review.   Sequelae of osteo, no active infection.,     Verbalized understanding.   Daughter reports that the pleural effusion has been there for 2 years and they are aware of it but he is not symptomatic.

## 2025-05-06 ENCOUNTER — OFFICE VISIT (OUTPATIENT)
Dept: INTERNAL MEDICINE | Facility: CLINIC | Age: 80
End: 2025-05-06
Payer: MEDICARE

## 2025-05-06 VITALS
BODY MASS INDEX: 24.59 KG/M2 | DIASTOLIC BLOOD PRESSURE: 38 MMHG | HEIGHT: 64 IN | OXYGEN SATURATION: 99 % | SYSTOLIC BLOOD PRESSURE: 86 MMHG | TEMPERATURE: 98.2 F | WEIGHT: 144 LBS | HEART RATE: 59 BPM

## 2025-05-06 DIAGNOSIS — E78.2 MIXED HYPERLIPIDEMIA: ICD-10-CM

## 2025-05-06 DIAGNOSIS — L40.9 PSORIASIS: ICD-10-CM

## 2025-05-06 DIAGNOSIS — N18.6 ESRD ON DIALYSIS: ICD-10-CM

## 2025-05-06 DIAGNOSIS — I48.0 PAROXYSMAL A-FIB: ICD-10-CM

## 2025-05-06 DIAGNOSIS — E11.22 TYPE 2 DIABETES MELLITUS WITH CHRONIC KIDNEY DISEASE ON CHRONIC DIALYSIS, WITHOUT LONG-TERM CURRENT USE OF INSULIN: ICD-10-CM

## 2025-05-06 DIAGNOSIS — Z99.2 TYPE 2 DIABETES MELLITUS WITH CHRONIC KIDNEY DISEASE ON CHRONIC DIALYSIS, WITHOUT LONG-TERM CURRENT USE OF INSULIN: ICD-10-CM

## 2025-05-06 DIAGNOSIS — Z99.2 ESRD ON DIALYSIS: ICD-10-CM

## 2025-05-06 DIAGNOSIS — E03.9 ACQUIRED HYPOTHYROIDISM: ICD-10-CM

## 2025-05-06 DIAGNOSIS — N18.6 TYPE 2 DIABETES MELLITUS WITH CHRONIC KIDNEY DISEASE ON CHRONIC DIALYSIS, WITHOUT LONG-TERM CURRENT USE OF INSULIN: ICD-10-CM

## 2025-05-06 DIAGNOSIS — D63.1 ANEMIA DUE TO CHRONIC KIDNEY DISEASE, ON CHRONIC DIALYSIS: ICD-10-CM

## 2025-05-06 DIAGNOSIS — N18.6 ANEMIA DUE TO CHRONIC KIDNEY DISEASE, ON CHRONIC DIALYSIS: ICD-10-CM

## 2025-05-06 DIAGNOSIS — D69.6 THROMBOCYTOPENIA: ICD-10-CM

## 2025-05-06 DIAGNOSIS — Z23 NEED FOR PNEUMOCOCCAL VACCINATION: ICD-10-CM

## 2025-05-06 DIAGNOSIS — D47.2 MONOCLONAL GAMMOPATHY OF UNKNOWN SIGNIFICANCE (MGUS): ICD-10-CM

## 2025-05-06 DIAGNOSIS — E27.40 ADRENAL INSUFFICIENCY: ICD-10-CM

## 2025-05-06 DIAGNOSIS — Z99.2 ANEMIA DUE TO CHRONIC KIDNEY DISEASE, ON CHRONIC DIALYSIS: ICD-10-CM

## 2025-05-06 DIAGNOSIS — I10 PRIMARY HYPERTENSION: Primary | ICD-10-CM

## 2025-05-06 PROBLEM — E06.3 HASHIMOTO'S DISEASE: Status: ACTIVE | Noted: 2025-05-06

## 2025-05-06 PROBLEM — L80 VITILIGO: Status: ACTIVE | Noted: 2025-05-06

## 2025-05-06 RX ORDER — HYDRALAZINE HYDROCHLORIDE 25 MG/1
25 TABLET, FILM COATED ORAL 3 TIMES DAILY PRN
Qty: 90 TABLET | Refills: 0 | Status: SHIPPED | OUTPATIENT
Start: 2025-05-06

## 2025-05-06 RX ORDER — B-COMPLEX WITH VITAMIN C
1 TABLET ORAL
COMMUNITY

## 2025-05-06 RX ORDER — CHLORAL HYDRATE 500 MG
1000 CAPSULE ORAL
COMMUNITY

## 2025-05-06 NOTE — PROGRESS NOTES
Subjective   The ABCs of the Annual Wellness Visit  Medicare Wellness Visit      Nelson Wang is a 80 y.o. patient who presents for a Medicare Wellness Visit.    The following portions of the patient's history were reviewed and   updated as appropriate: allergies, current medications, past family history, past medical history, past social history, past surgical history, and problem list.    Compared to one year ago, the patient's physical   health is better.  Compared to one year ago, the patient's mental   health is worse.    Recent Hospitalizations:  This patient has had a [facility] admission record on file within the last 365 days.  Current Medical Providers:  Patient Care Team:  Jarek Kong Jr., MD as PCP - General (Internal Medicine)  Josephine Warren APRN as Nurse Practitioner (Pulmonary Disease)  Virginie Lopez APRN as Nurse Practitioner (Pulmonary Disease)    Outpatient Medications Prior to Visit   Medication Sig Dispense Refill    atorvastatin (LIPITOR) 40 MG tablet Take 1 tablet by mouth Daily. 90 tablet 1    Budeson-Glycopyrrol-Formoterol (Breztri Aerosphere) 160-9-4.8 MCG/ACT aerosol inhaler Inhale 2 puffs 2 (Two) Times a Day. 10.7 g 10    Calcium Carbonate-Vitamin D (Oyster Shell Calcium/D) 500-5 MG-MCG tablet Take 1 tablet by mouth. (Patient not taking: Reported on 5/15/2025)      famotidine (Pepcid) 20 MG tablet Take 1 tablet by mouth Daily. 90 tablet 2    fluticasone (Flonase Allergy Relief) 50 MCG/ACT nasal spray Administer 2 sprays into the nostril(s) as directed by provider Daily.      guaiFENesin (Mucinex) 600 MG 12 hr tablet Take 1 tablet by mouth Daily.      hydrocortisone (CORTEF) 10 MG tablet Take 1 tablet by mouth Daily. (Patient taking differently: Take 1.5 tablets by mouth 2 (Two) Times a Day.)      levothyroxine (SYNTHROID, LEVOTHROID) 150 MCG tablet Take 1 tablet by mouth daily. 90 tablet 1    Methoxy PEG-Epoetin Beta (MIRCERA IJ) 200 mcg.      Omega-3 Fatty Acids  (fish oil) 1000 MG capsule capsule Take 1 capsule by mouth Daily With Breakfast.      QUEtiapine (SEROquel) 25 MG tablet Take 1 tablet by mouth 2 times a day as needed for agitation and 2 tablets by mouth at bedtime for insomnia (Patient taking differently: Take 1 tablet by mouth Daily.) 120 tablet 2    sertraline (ZOLOFT) 50 MG tablet Take 2 tablets by mouth Daily.      sevelamer (RENVELA) 800 MG tablet 1 tablet 3 (Three) Times a Day. (Patient taking differently: 1 tablet 2 (Two) Times a Day.)      sodium zirconium cyclosilicate (Lokelma) 5 g packet Take 5 g by mouth Every Other Day. 15 each 3    TURMERIC PO Take  by mouth.      carvedilol (Coreg) 12.5 MG tablet Take 1 tablet by mouth 2 (Two) Times a Day With Meals.      donepezil (ARICEPT) 5 MG tablet Take 1 tablet by mouth every night at bedtime. 90 tablet 1    hydrocortisone (CORTEF) 10 MG tablet Take 1 & 1/2  tablet by mouth 2 (Two) Times a Day. 270 tablet 3    QUEtiapine (SEROquel) 25 MG tablet Take 0.5 tablets by mouth Every Night for 30 days.      allopurinol (ZYLOPRIM) 300 MG tablet Take 1 tablet by mouth Daily. (Patient not taking: Reported on 5/15/2025) 90 tablet 2    amoxicillin-clavulanate (AUGMENTIN) 875-125 MG per tablet Take 1 tablet by mouth Every 12 (Twelve) Hours. (Patient not taking: Reported on 5/6/2025)      budesonide (PULMICORT) 0.5 MG/2ML nebulizer solution Take 2 mL by nebulization 2 (Two) Times a Day for 30 days. (Patient not taking: Reported on 5/6/2025) 120 mL 0    famotidine (Pepcid) 20 MG tablet Take 1 tablet by mouth Daily. (Patient not taking: Reported on 5/6/2025) 90 tablet 1    hydrocortisone (CORTEF) 10 MG tablet Take 1 tablet by mouth 2 (Two) Times a Day With Meals. (Patient not taking: Reported on 5/6/2025) 60 tablet 0    hydrocortisone (CORTEF) 10 MG tablet Take 1.5 tablets in the morning and 1 tablet at 2 PM.. (Patient not taking: Reported on 5/6/2025) 150 tablet 3    levothyroxine (SYNTHROID, LEVOTHROID) 175 MCG tablet Take 1  tablet by mouth Daily. (Patient not taking: Reported on 5/6/2025)      levothyroxine (SYNTHROID, LEVOTHROID) 175 MCG tablet Take 1 tablet by mouth Daily. (Patient not taking: Reported on 5/6/2025) 90 tablet 0    QUEtiapine (SEROquel) 50 MG tablet Take 1/2 tablet by mouth 2 times a day as needed for agitation then take 1 tablet by mouth at bedtime for insomnia (Patient not taking: Reported on 5/6/2025) 90 tablet 1    sevelamer (RENVELA) 0.8 g pack packet 1 packet 3 (Three) Times a Day. (Patient not taking: Reported on 5/6/2025)      traZODone (DESYREL) 50 MG tablet Take 1 tablet by mouth every night at bedtime. (Patient not taking: Reported on 5/6/2025) 90 tablet 1     No facility-administered medications prior to visit.     No opioid medication identified on active medication list. I have reviewed chart for other potential  high risk medication/s and harmful drug interactions in the elderly.      Aspirin is not on active medication list.  Aspirin use is not indicated based on review of current medical condition/s. Risk of harm outweighs potential benefits.  .    Patient Active Problem List   Diagnosis    Essential hypertension    Bradycardia, sinus    Anemia due to chronic kidney disease    Acquired hypothyroidism    Proteinuria    Psoriasis    Thrombocytopenia    Type 2 diabetes mellitus with chronic kidney disease on chronic dialysis, without long-term current use of insulin    Mixed hyperlipidemia    Monoclonal gammopathy of unknown significance (MGUS)    Stage 5 chronic kidney disease    Chronic gout without tophus    Peritoneal dialysis catheter dysfunction    Chronic cough    Recurrent pneumonia    Aspiration pneumonitis    GERD without esophagitis    Immunosuppression due to drug therapy    Bipolar disorder    Chronic respiratory failure with hypoxia    Dyspnea on exertion    Abnormal stress test    ESRD on dialysis    Abnormal chest CT    History of colon polyps    Family history of colon cancer    AMS  "(altered mental status)    Hallucinations    Metabolic encephalopathy    Aspiration pneumonia    Discitis of lumbar region    Severe malnutrition    Dementia    Vitiligo    Hashimoto's disease    Paroxysmal A-fib    Adrenal insufficiency     Advance Care Planning Advance Directive is not on file.  ACP discussion was held with the patient during this visit. Patient does not have an advance directive, information provided.        Objective   Vitals:    25 1134 25 1143   BP: (!) 87/45 (!) 86/38   BP Location: Right arm Right arm   Patient Position: Sitting Sitting   Cuff Size: Adult Adult   Pulse: 59    Temp: 98.2 °F (36.8 °C)    TempSrc: Temporal    SpO2: 99%    Weight: 65.3 kg (144 lb)    Height: 162.6 cm (64\")        Estimated body mass index is 24.72 kg/m² as calculated from the following:    Height as of this encounter: 162.6 cm (64\").    Weight as of this encounter: 65.3 kg (144 lb).    BMI is within normal parameters. No other follow-up for BMI required.    Does the patient have evidence of cognitive impairment? No  Lab Results   Component Value Date    HGBA1C 5.9 2025                                                                                                Health  Risk Assessment    Smoking Status:  Social History     Tobacco Use   Smoking Status Former    Current packs/day: 0.00    Average packs/day: 1 pack/day for 10.0 years (10.0 ttl pk-yrs)    Types: Cigarettes    Start date: 1990    Quit date: 2000    Years since quittin.3    Passive exposure: Past   Smokeless Tobacco Never   Tobacco Comments    quit 25 years ago, chews nicotine gum in past     Alcohol Consumption:  Social History     Substance and Sexual Activity   Alcohol Use Not Currently       Fall Risk Screen  STEADI Fall Risk Assessment was completed, and patient is at LOW risk for falls.Assessment completed on:2025    Depression Screening   Little interest or pleasure in doing things? Not at all   Feeling down, " depressed, or hopeless? Not at all   PHQ-2 Total Score 0      Health Habits and Functional and Cognitive Screenin/6/2025    11:38 AM   Functional & Cognitive Status   Do you have difficulty preparing food and eating? No   Do you have difficulty bathing yourself, getting dressed or grooming yourself? No   Do you have difficulty using the toilet? No   Do you have difficulty moving around from place to place? No   Do you have trouble with steps or getting out of a bed or a chair? Yes   Current Diet Well Balanced Diet   Dental Exam Not up to date   Eye Exam Up to date   Exercise (times per week) 0 times per week   Current Exercises Include No Regular Exercise   Do you need help using the phone?  No   Are you deaf or do you have serious difficulty hearing?  Yes   Do you need help to go to places out of walking distance? No   Do you need help shopping? No   Do you need help preparing meals?  No   Do you need help with housework?  No   Do you need help with laundry? No   Do you need help taking your medications? Yes   Do you need help managing money? No   Do you ever drive or ride in a car without wearing a seat belt? No   Have you felt unusual stress, anger or loneliness in the last month? No   Who do you live with? Spouse   If you need help, do you have trouble finding someone available to you? No   Have you been bothered in the last four weeks by sexual problems? No   Do you have difficulty concentrating, remembering or making decisions? Yes           Age-appropriate Screening Schedule:  Refer to the list below for future screening recommendations based on patient's age, sex and/or medical conditions. Orders for these recommended tests are listed in the plan section. The patient has been provided with a written plan.    Health Maintenance List  Health Maintenance   Topic Date Due    TDAP/TD VACCINES (1 - Tdap) Never done    ZOSTER VACCINE (1 of 2) Never done    DIABETIC EYE EXAM  2021    ANNUAL WELLNESS  VISIT  12/06/2024    COVID-19 Vaccine (7 - Pfizer risk 2024-25 season) 03/18/2025    DIABETIC FOOT EXAM  05/01/2025    COLORECTAL CANCER SCREENING  01/01/2026 (Originally 1/25/1991)    INFLUENZA VACCINE  07/01/2025    HEMOGLOBIN A1C  10/01/2025    GASTROSCOPY (EGD)  10/28/2025    LIPID PANEL  01/13/2026    HEPATITIS C SCREENING  Completed    Hepatitis B  Completed    RSV Vaccine - Adults  Completed    Pneumococcal Vaccine 50+  Completed    URINE MICROALBUMIN-CREATININE RATIO (uACR)  Discontinued                                                                                                                                                CMS Preventative Services Quick Reference  Risk Factors Identified During Encounter  Immunizations Discussed/Encouraged: Prevnar 20 (Pneumococcal 20-valent conjugate)    The above risks/problems have been discussed with the patient.  Pertinent information has been shared with the patient in the After Visit Summary.  An After Visit Summary and PPPS were made available to the patient.    Follow Up:   Next Medicare Wellness visit to be scheduled in 1 year.        Additional E&M Note during same encounter follows:  Patient has multiple medical problems which are significant and separately identifiable that require additional work above and beyond the Medicare Wellness Visit.      Chief Complaint  Medicare Wellness-subsequent    Nelson Wang is a 80 y.o. male who presents to [facility]   Adrenal crisis- on cortef. He is on 15mg and then 10mg. Previously on 15mg BID and changed 3 days ago.   Hypertension- patient has been on coreg since having Blood Pressure >200 after HD last night. Patient has midodrine prn for low Blood Pressure after dialysis;   Hypothyroid- recently reduced in the last week.   Memory- trialed donepezil and then couldn't sleep. So he has stopped taking.  History of osteomyelitis and prolonged hospitalization approx 6  "months prior.    Hyperlipidemia- doing well with atorvastatin  ESRD on HD at home- patient has EPO as well as sevelamer.     Objective   Vital Signs:   Vitals:    05/06/25 1134 05/06/25 1143   BP: (!) 87/45 (!) 86/38   BP Location: Right arm Right arm   Patient Position: Sitting Sitting   Cuff Size: Adult Adult   Pulse: 59    Temp: 98.2 °F (36.8 °C)    TempSrc: Temporal    SpO2: 99%    Weight: 65.3 kg (144 lb)    Height: 162.6 cm (64\")        Wt Readings from Last 3 Encounters:   05/15/25 66.5 kg (146 lb 9.6 oz)   05/14/25 65.3 kg (144 lb)   05/06/25 65.3 kg (144 lb)     BP Readings from Last 3 Encounters:   05/15/25 143/70   05/14/25 139/68   05/08/25 124/50       Physical Exam  Appearance: No acute distress, well-nourished  Head: normocephalic, atraumatic  Eyes: extraocular movements intact, no scleral icterus, no conjunctival injection  Ears, Nose, and Throat: external ears normal, nares patent, moist mucous membranes  Cardiovascular: regular rate and rhythm. no murmurs, rubs, or gallops. no edema  Respiratory: breathing comfortably, symmetric chest rise, clear to auscultation bilaterally. No wheezes, rales, or rhonchi.  Neuro: alert and oriented to time, place, and person. Normal gait  Psych: normal mood and affect     The following data was reviewed by Jarek Kong Jr, MD on 05/06/2025  Common Labs   Common labs          4/1/2025    00:00 5/8/2025    21:11 5/10/2025    00:00   Common Labs   Glucose  204     BUN  47     Creatinine  4.89     Sodium  137     Potassium  3.4     Chloride  98     Calcium  9.1     Albumin  3.8     Total Bilirubin  0.4     Alkaline Phosphatase  85     AST (SGOT)  24     ALT (SGPT)  18     WBC  8.44     Hemoglobin  11.9  10.3        30.9       Hematocrit  38.7     Platelets  121     Hemoglobin A1C 5.9            Details          This result is from an external source.                 Assessment & Plan   Diagnoses and all orders for this visit:    1. Primary hypertension " (Primary)  Comments:  given low HR, transition from coreg prn to hydralazine prn.  Orders:  -     hydrALAZINE (APRESOLINE) 25 MG tablet; Take 1 tablet by mouth 3 (Three) Times a Day As Needed (SBP>160). (Patient not taking: Reported on 5/15/2025)  Dispense: 90 tablet; Refill: 0    2. Psoriasis    3. Need for pneumococcal vaccination  -     Pneumococcal Conjugate Vaccine 20-Valent All    4. Paroxysmal A-fib  Comments:  rate controlled today. will discuss anticoagulation    5. Mixed hyperlipidemia  Comments:  on statin therapy. goal LDL <55.    6. Thrombocytopenia    7. Acquired hypothyroidism  Comments:  TSH reviewed and medication recently adjusted.    8. Type 2 diabetes mellitus with chronic kidney disease on chronic dialysis, without long-term current use of insulin  Comments:  well controlled on most recent HgbA1c.    9. ESRD on dialysis  Comments:  cont HD at home. f/u with nephrology. on EPO and sevelamer.    10. Monoclonal gammopathy of unknown significance (MGUS)    11. Anemia due to chronic kidney disease, on chronic dialysis  Comments:  stable on most recent check. cont EPO    12. Adrenal insufficiency  Comments:  on solucortef regimen. working to wean.          BMI is within normal parameters. No other follow-up for BMI required.       FOLLOW UP  No follow-ups on file.  Patient was given instructions and counseling regarding his condition or for health maintenance advice. Please see specific information pulled into the AVS if appropriate.     Jarek Kong Jr, MD  05/16/25  11:46 EDT

## 2025-05-06 NOTE — LETTER
[facility]  Vaccine Consent Form    Patient Name:  Nelson Wang  Patient :  1945     Vaccine(s) Ordered    Pneumococcal Conjugate Vaccine 20-Valent All        Screening Checklist  The following questions should be completed prior to vaccination. If you answer “yes” to any question, it does not necessarily mean you should not be vaccinated. It just means we may need to clarify or ask more questions. If a question is unclear, please ask your healthcare provider to explain it.    Yes No   Any fever or moderate to severe illness today (mild illness and/or antibiotic treatment are not contraindications)?     Do you have a history of a serious reaction to any previous vaccinations, such as anaphylaxis, encephalopathy within 7 days, Guillain-Pekin syndrome within 6 weeks, seizure?     Have you received any live vaccine(s) (e.g MMR, ROSS) or any other vaccines in the last month (to ensure duplicate doses aren't given)?     Do you have an anaphylactic allergy to latex (DTaP, DTaP-IPV, Hep A, Hep B, MenB, RV, Td, Tdap), baker’s yeast (Hep B, HPV), polysorbates (RSV, nirsevimab, PCV 20, Rotavirrus, Tdap, Shingrix), or gelatin (ROSS, MMR)?     Do you have an anaphylactic allergy to neomycin (Rabies, ROSS, MMR, IPV, Hep A), polymyxin B (IPV), or streptomycin (IPV)?      Any cancer, leukemia, AIDS, or other immune system disorder? (ROSS, MMR, RV)     Do you have a parent, brother, or sister with an immune system problem (if immune competence of vaccine recipient clinically verified, can proceed)? (MMR, ROSS)     Any recent steroid treatments for >2 weeks, chemotherapy, or radiation treatment? (ROSS, MMR)     Have you received antibody-containing blood transfusions or IVIG in the past 11 months (recommended interval is dependent on product)? (MMR, ROSS)     Have you taken antiviral drugs (acyclovir, famciclovir, valacyclovir for ROSS) in the last 24 or 48 hours, respectively?      Are you pregnant or planning to become  "pregnant within 1 month? (ROSS, MMR, HPV, IPV, MenB, Abrexvy; For Hep B- refer to Engerix-B; For RSV - Abrysvo is indicated for 32-36 weeks of pregnancy from September to January)     For infants, have you ever been told your child has had intussusception or a medical emergency involving obstruction of the intestine (Rotavirus)? If not for an infant, can skip this question.         *Ordering Physicians/APC should be consulted if \"yes\" is checked by the patient or guardian above.  I have received, read, and understand the Vaccine Information Statement (VIS) for each vaccine ordered.  I have considered my or my child's health status as well as the health status of my close contacts.  I have taken the opportunity to discuss my vaccine questions with my or my child's health care provider.   I have requested that the ordered vaccine(s) be given to me or my child.  I understand the benefits and risks of the vaccines.  I understand that I should remain in the clinic for 15 minutes after receiving the vaccine(s).  _________________________________________________________  Signature of Patient or Parent/Legal Guardian ____________________  Date     "

## 2025-05-08 ENCOUNTER — HOSPITAL ENCOUNTER (EMERGENCY)
Facility: HOSPITAL | Age: 80
Discharge: HOME | End: 2025-05-08
Attending: EMERGENCY MEDICINE
Payer: MEDICARE

## 2025-05-08 VITALS
HEART RATE: 80 BPM | RESPIRATION RATE: 17 BRPM | DIASTOLIC BLOOD PRESSURE: 50 MMHG | TEMPERATURE: 98.9 F | SYSTOLIC BLOOD PRESSURE: 124 MMHG | OXYGEN SATURATION: 95 %

## 2025-05-08 DIAGNOSIS — R55 SYNCOPE, UNSPECIFIED SYNCOPE TYPE: Primary | ICD-10-CM

## 2025-05-08 LAB
ALBUMIN SERPL-MCNC: 3.8 G/DL (ref 3.5–5.2)
ALBUMIN/GLOB SERPL: 1.4 G/DL
ALP SERPL-CCNC: 85 U/L (ref 39–117)
ALT SERPL W P-5'-P-CCNC: 18 U/L (ref 1–41)
ANION GAP SERPL CALCULATED.3IONS-SCNC: 13.5 MMOL/L (ref 5–15)
AST SERPL-CCNC: 24 U/L (ref 1–40)
BASOPHILS # BLD AUTO: 0.04 10*3/MM3 (ref 0–0.2)
BASOPHILS NFR BLD AUTO: 0.5 % (ref 0–1.5)
BILIRUB SERPL-MCNC: 0.4 MG/DL (ref 0–1.2)
BUN SERPL-MCNC: 47 MG/DL (ref 8–23)
BUN/CREAT SERPL: 9.6 (ref 7–25)
CALCIUM SPEC-SCNC: 9.1 MG/DL (ref 8.6–10.5)
CHLORIDE SERPL-SCNC: 98 MMOL/L (ref 98–107)
CO2 SERPL-SCNC: 25.5 MMOL/L (ref 22–29)
CREAT SERPL-MCNC: 4.89 MG/DL (ref 0.76–1.27)
DEPRECATED RDW RBC AUTO: 54.8 FL (ref 37–54)
EGFRCR SERPLBLD CKD-EPI 2021: 11.4 ML/MIN/1.73
EOSINOPHIL # BLD AUTO: 0.05 10*3/MM3 (ref 0–0.4)
EOSINOPHIL NFR BLD AUTO: 0.6 % (ref 0.3–6.2)
ERYTHROCYTE [DISTWIDTH] IN BLOOD BY AUTOMATED COUNT: 15.6 % (ref 12.3–15.4)
GLOBULIN UR ELPH-MCNC: 2.8 GM/DL
GLUCOSE SERPL-MCNC: 204 MG/DL (ref 65–99)
HCT VFR BLD AUTO: 38.7 % (ref 37.5–51)
HGB BLD-MCNC: 11.9 G/DL (ref 13–17.7)
IMM GRANULOCYTES # BLD AUTO: 0.03 10*3/MM3 (ref 0–0.05)
IMM GRANULOCYTES NFR BLD AUTO: 0.4 % (ref 0–0.5)
LYMPHOCYTES # BLD AUTO: 2.11 10*3/MM3 (ref 0.7–3.1)
LYMPHOCYTES NFR BLD AUTO: 25 % (ref 19.6–45.3)
MCH RBC QN AUTO: 29.4 PG (ref 26.6–33)
MCHC RBC AUTO-ENTMCNC: 30.7 G/DL (ref 31.5–35.7)
MCV RBC AUTO: 95.6 FL (ref 79–97)
MONOCYTES # BLD AUTO: 0.67 10*3/MM3 (ref 0.1–0.9)
MONOCYTES NFR BLD AUTO: 7.9 % (ref 5–12)
NEUTROPHILS NFR BLD AUTO: 5.54 10*3/MM3 (ref 1.7–7)
NEUTROPHILS NFR BLD AUTO: 65.6 % (ref 42.7–76)
NRBC BLD AUTO-RTO: 0 /100 WBC (ref 0–0.2)
PLATELET # BLD AUTO: 121 10*3/MM3 (ref 140–450)
PMV BLD AUTO: 11 FL (ref 6–12)
POTASSIUM SERPL-SCNC: 3.4 MMOL/L (ref 3.5–5.2)
PROT SERPL-MCNC: 6.6 G/DL (ref 6–8.5)
RBC # BLD AUTO: 4.05 10*6/MM3 (ref 4.14–5.8)
SODIUM SERPL-SCNC: 137 MMOL/L (ref 136–145)
WBC NRBC COR # BLD AUTO: 8.44 10*3/MM3 (ref 3.4–10.8)

## 2025-05-08 PROCEDURE — 36415 COLL VENOUS BLD VENIPUNCTURE: CPT

## 2025-05-08 PROCEDURE — 93005 ELECTROCARDIOGRAM TRACING: CPT | Performed by: EMERGENCY MEDICINE

## 2025-05-08 PROCEDURE — 80053 COMPREHEN METABOLIC PANEL: CPT | Performed by: EMERGENCY MEDICINE

## 2025-05-08 PROCEDURE — 85025 COMPLETE CBC W/AUTO DIFF WBC: CPT | Performed by: EMERGENCY MEDICINE

## 2025-05-08 PROCEDURE — 99283 EMERGENCY DEPT VISIT LOW MDM: CPT

## 2025-05-08 NOTE — Clinical Note
[facility] EMERGENCY ROOM  [address] [place] [address]  Phone: [phone]  Fax: [phone]    Nelson Wang was seen and treated in our emergency department on 5/8/2025.  He may return to work on 05/14/2025.         Thank you for choosing [facility].    Lorne Sánchez MD

## 2025-05-09 NOTE — ED PROVIDER NOTES
Time: 9:09 PM EDT  Date of encounter:  5/8/2025  Independent Historian/Clinical History and Information was obtained by:   Patient, Family, and EMS    History is limited by: N/A    Chief Complaint: Syncope      History of Present Illness:  Patient is a 80 y.o. year old male who presents to the emergency department for evaluation of syncope    Majority of history provided by the patient's daughter who is a physician.  Patient receives home hemodialysis and was being dialyzed just prior to arrival.  He began bleeding from his left upper extremity AV fistula site during dialysis.  His daughter ultimately went to check on him because of the large amount of bleeding when he seemed to lose consciousness and color in his face and began to have some shaking movements.  Daughter pulled him to the floor and placed him in Trendelenburg.  EMS was notified arrived treated with 250 cc of normal saline and 125 of Solu-Medrol.  EMS reports patient's blood pressure has been within normal range en route to the hospital.  Patient's been awake and alert in transport.      Patient Care Team  Primary Care Provider: Jarek Kong Jr., MD    Past Medical History:     No Known Allergies  Past Medical History:   Diagnosis Date    Abnormal stress test     Anemia     IRON INFUSIONS WITH DIALYSIS    Anesthesia     WITH HIP REPLACEMENT DAUGHTER BELIEVES HAD SVT 2000    Ankle pain, right     Anxiety and depression     Arthritis     CKD (chronic kidney disease), stage IV     DIALYSIS [id number] SHILEY IN RIGHT CHEST    Diabetes     GERD (gastroesophageal reflux disease)     Gout     High cholesterol     History of peritoneal dialysis     HL (hearing loss)     Hyperkalemia     Hypertension     Hypothyroidism     Insomnia     Night terrors     Psoriasis     Psoriasis     Sleep apnea     Sleep walking     Thrombocytopenia     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    Vitiligo      Past Surgical History:   Procedure Laterality Date     ABDOMINAL SURGERY      ANKLE SURGERY Right 11/1990    APPENDECTOMY N/A 1954    ARTERIOVENOUS FISTULA/SHUNT SURGERY Left 07/16/2024    Procedure: Creation of left arm arteriovenous fistula;  Surgeon: Moses Mir MD;  Location: [facility] MAIN OR;  Service: Vascular;  Laterality: Left;    BRONCHOSCOPY N/A 03/01/2024    Procedure: BRONCHOSCOPY WITH BAL AND WASHINGS;  Surgeon: Sandra Espinal MD;  Location: [facility] ENDOSCOPY;  Service: Pulmonary;  Laterality: N/A;  PNEUMONIA    BRONCHOSCOPY N/A 08/30/2024    Procedure: BRONCHOSCOPY WITH BALS AND WASHINGS;  Surgeon: Sandra Espinal MD;  Location: [facility] ENDOSCOPY;  Service: Pulmonary;  Laterality: N/A;  MUCOUS PLUGGING    CARDIAC CATHETERIZATION N/A 05/29/2024    Procedure: Left Heart Cath with possible coronary angioplasty;  Surgeon: Stephan Nichols MD;  Location: [facility] CATH INVASIVE LOCATION;  Service: Cardiology;  Laterality: N/A;    COLONOSCOPY N/A 06/2022    [facility]    ENDOSCOPY N/A 10/28/2024    Procedure: ESOPHAGOGASTRODUODENOSCOPY INSERTION OF LIGHTED INSTRUMENT TO VIEW ESOPHAGUS, STOMACH AND SMALL INTESTINE;  Surgeon: Kingsley Peraza MD;  Location: [facility] ENDOSCOPY;  Service: Gastroenterology;  Laterality: N/A;  Gastritis    FRACTURE SURGERY      HIP BIPOLAR REPLACEMENT Right 01/2000    INSERTION HEMODIALYSIS CATHETER Left 04/09/2024    Procedure: HEMODIALYSIS CATHETER INSERTION;  Surgeon: Jose Berry MD;  Location: [facility] OR;  Service: General;  Laterality: Left;    INSERTION HEMODIALYSIS CATHETER N/A 04/12/2024    Procedure: Tunneled hemodialysis catheter insertion;  Surgeon: Enrique Vinson MD;  Location: [facility] OR;  Service: Vascular;  Laterality: N/A;    INSERTION PERITONEAL DIALYSIS CATHETER N/A 03/27/2023    Procedure: LAPAROSCOPIC INSERTION PERITONEAL DIALYSIS CATHETER, LAPAROSCOPIC OMENTOPEXY WITH LYSIS OF ADHESIONS;  Surgeon: Jose Berry MD;  Location: [facility] OR;  Service: General;   Laterality: N/A;    INSERTION PERITONEAL DIALYSIS CATHETER Left 07/23/2023    Procedure: REVISION OF PERITONEAL DIALYSIS CATHETER;  Surgeon: Radha Oreilly MD;  Location: [facility] MAIN OR;  Service: General;  Laterality: Left;    JOINT REPLACEMENT      REMOVAL PERITONEAL DIALYSIS CATHETER N/A 04/09/2024    Procedure: REMOVAL PERITONEAL DIALYSIS CATHETER;  Surgeon: Jose Berry MD;  Location: [facility] MAIN OR;  Service: General;  Laterality: N/A;    RENAL BIOPSY Left 07/15/2022    UPPER GASTROINTESTINAL ENDOSCOPY      VASCULAR SURGERY      WRIST SURGERY      UNSURE WHICH SIDE DAUGHTER REPORTS HAD SEVERE  CUT FROM WINDOW AND THEY HAD TO DO RECONSTRUCTIVE SURGERY WITH VESSELS AND NERVES     Family History   Problem Relation Age of Onset    Diabetes Mother     Heart disease Father     Colon cancer Sister 77    Cancer Sister 35    Diabetes Sister     Diabetes Brother     Sleep apnea Paternal Aunt     Heart disease Paternal Uncle     Sleep apnea Daughter     Malig Hyperthermia Neg Hx        Home Medications:  Prior to Admission medications    Medication Sig Start Date End Date Taking? Authorizing Provider   allopurinol (ZYLOPRIM) 300 MG tablet Take 1 tablet by mouth Daily.  Patient not taking: Reported on 5/6/2025 7/2/24   Rolf Cha MD   atorvastatin (LIPITOR) 40 MG tablet Take 1 tablet by mouth Daily. 7/17/24   Domingo Bravo MD   Budeson-Glycopyrrol-Formoterol (Breztri Aerosphere) 160-9-4.8 MCG/ACT aerosol inhaler Inhale 2 puffs 2 (Two) Times a Day. 10/14/24      Calcium Carbonate-Vitamin D (Oyster Shell Calcium/D) 500-5 MG-MCG tablet Take 1 tablet by mouth.    Serena Matute MD   famotidine (Pepcid) 20 MG tablet Take 1 tablet by mouth Daily. 11/22/24   Domingo Bravo MD   fluticasone (Flonase Allergy Relief) 50 MCG/ACT nasal spray Administer 2 sprays into the nostril(s) as directed by provider Daily. 2/24/25   Serena Matute MD   guaiFENesin (Mucinex) 600 MG 12 hr tablet Take 1  tablet by mouth Daily. 25   Serena Matute MD   hydrALAZINE (APRESOLINE) 25 MG tablet Take 1 tablet by mouth 3 (Three) Times a Day As Needed (SBP>160). 25   Jarek Kong Jr., MD   hydrocortisone (CORTEF) 10 MG tablet Take 1 tablet by mouth Daily. 25   Serena Matute MD   levothyroxine (SYNTHROID, LEVOTHROID) 150 MCG tablet Take 1 tablet by mouth daily. 25      Methoxy PEG-Epoetin Beta (MIRCERA IJ) 200 mcg. 25   Serena Matute MD   Omega-3 Fatty Acids (fish oil) 1000 MG capsule capsule Take 1 capsule by mouth Daily With Breakfast.    Serena Matute MD   QUEtiapine (SEROquel) 25 MG tablet Take 1 tablet by mouth 2 times a day as needed for agitation and 2 tablets by mouth at bedtime for insomnia 24      sertraline (ZOLOFT) 50 MG tablet Take 2 tablets by mouth Daily.    Serena Matute MD   sevelamer (RENVELA) 800 MG tablet 1 tablet 3 (Three) Times a Day. 3/25/25 3/24/26  Serena Matute MD   sodium zirconium cyclosilicate (Lokelma) 5 g packet Take 5 g by mouth Every Other Day. 3/26/25   Coreen Castro MD   TURMERIC PO Take  by mouth.    Serena Matute MD        Social History:   Social History     Tobacco Use    Smoking status: Former     Current packs/day: 0.00     Average packs/day: 1 pack/day for 10.0 years (10.0 ttl pk-yrs)     Types: Cigarettes     Start date:      Quit date: 2000     Years since quittin.3     Passive exposure: Past    Smokeless tobacco: Never    Tobacco comments:     quit 25 years ago, chews nicotine gum in past   Vaping Use    Vaping status: Former   Substance Use Topics    Alcohol use: Not Currently     Comment: 1 DRINK/DAY-rarely    Drug use: Never         Review of Systems:  Review of Systems   Constitutional:  Negative for chills and fever.   HENT:  Negative for congestion, ear pain and sore throat.    Eyes:  Negative for pain.   Respiratory:  Negative for cough, chest tightness and  shortness of breath.    Cardiovascular:  Negative for chest pain.   Gastrointestinal:  Negative for abdominal pain, diarrhea, nausea and vomiting.   Genitourinary:  Negative for flank pain and hematuria.   Musculoskeletal:  Negative for joint swelling.   Skin:  Negative for pallor.   Neurological:  Negative for seizures and headaches.   Hematological:  Bruises/bleeds easily.   All other systems reviewed and are negative.       Physical Exam:  /50   Pulse 80   Temp 98.9 °F (37.2 °C) (Oral)   Resp 17   SpO2 95%     Physical Exam  Vitals and nursing note reviewed.   Constitutional:       General: He is not in acute distress.     Appearance: Normal appearance. He is not toxic-appearing.   HENT:      Head: Normocephalic and atraumatic.      Jaw: There is normal jaw occlusion.   Eyes:      General: Lids are normal.      Extraocular Movements: Extraocular movements intact.      Conjunctiva/sclera: Conjunctivae normal.      Pupils: Pupils are equal, round, and reactive to light.   Cardiovascular:      Rate and Rhythm: Normal rate and regular rhythm.      Pulses: Normal pulses.      Heart sounds: Normal heart sounds.      Comments: Left upper extremity AV fistula with palpable thrill.  No overlying skin lesion.  No evidence of infection.  Pulmonary:      Effort: Pulmonary effort is normal. No respiratory distress.      Breath sounds: Normal breath sounds. No wheezing or rhonchi.   Abdominal:      General: Abdomen is flat.      Palpations: Abdomen is soft.      Tenderness: There is no abdominal tenderness. There is no guarding or rebound.   Musculoskeletal:         General: Normal range of motion.      Cervical back: Normal range of motion and neck supple.      Right lower leg: No edema.      Left lower leg: No edema.   Skin:     General: Skin is warm and dry.   Neurological:      Mental Status: He is alert and oriented to person, place, and time. Mental status is at baseline.   Psychiatric:         Mood and Affect:  Mood normal.                 Medical Decision Making:      Comorbidities that affect care:    Hypertension, diabetes, end-stage renal disease on dialysis, thrombocytopenia    External Notes reviewed:    Previous Clinic Note: Outpatient PCP visit primary hypertension 5/6/2025      The following orders were placed and all results were independently analyzed by me:  Orders Placed This Encounter   Procedures    Comprehensive Metabolic Panel    CBC Auto Differential    ECG 12 Lead Syncope    CBC & Differential       Medications Given in the Emergency Department:  Medications - No data to display     ED Course:         Labs:    Lab Results (last 24 hours)       Procedure Component Value Units Date/Time    CBC & Differential [[id number]]  (Abnormal) Collected: 05/08/25 2111    Specimen: Blood Updated: 05/08/25 2118    Narrative:      The following orders were created for panel order CBC & Differential.  Procedure                               Abnormality         Status                     ---------                               -----------         ------                     CBC Auto Differential[[id number]]        Abnormal            Final result                 Please view results for these tests on the individual orders.    Comprehensive Metabolic Panel [[id number]]  (Abnormal) Collected: 05/08/25 2111    Specimen: Blood Updated: 05/08/25 2135     Glucose 204 mg/dL      BUN 47 mg/dL      Creatinine 4.89 mg/dL      Sodium 137 mmol/L      Potassium 3.4 mmol/L      Comment: Slight hemolysis detected by analyzer. Result may be falsely elevated.        Chloride 98 mmol/L      CO2 25.5 mmol/L      Calcium 9.1 mg/dL      Total Protein 6.6 g/dL      Albumin 3.8 g/dL      ALT (SGPT) 18 U/L      AST (SGOT) 24 U/L      Alkaline Phosphatase 85 U/L      Total Bilirubin 0.4 mg/dL      Globulin 2.8 gm/dL      A/G Ratio 1.4 g/dL      BUN/Creatinine Ratio 9.6     Anion Gap 13.5 mmol/L      eGFR 11.4 mL/min/1.73     Narrative:      GFR  Categories in Chronic Kidney Disease (CKD)              GFR Category          GFR (mL/min/1.73)    Interpretation  G1                    90 or greater        Normal or high (1)  G2                    60-89                Mild decrease (1)  G3a                   45-59                Mild to moderate decrease  G3b                   30-44                Moderate to severe decrease  G4                    15-29                Severe decrease  G5                    14 or less           Kidney failure    (1)In the absence of evidence of kidney disease, neither GFR category G1 or G2 fulfill the criteria for CKD.    eGFR calculation 2021 CKD-EPI creatinine equation, which does not include race as a factor    CBC Auto Differential [[id number]]  (Abnormal) Collected: 05/08/25 2111    Specimen: Blood Updated: 05/08/25 2118     WBC 8.44 10*3/mm3      RBC 4.05 10*6/mm3      Hemoglobin 11.9 g/dL      Hematocrit 38.7 %      MCV 95.6 fL      MCH 29.4 pg      MCHC 30.7 g/dL      RDW 15.6 %      RDW-SD 54.8 fl      MPV 11.0 fL      Platelets 121 10*3/mm3      Neutrophil % 65.6 %      Lymphocyte % 25.0 %      Monocyte % 7.9 %      Eosinophil % 0.6 %      Basophil % 0.5 %      Immature Grans % 0.4 %      Neutrophils, Absolute 5.54 10*3/mm3      Lymphocytes, Absolute 2.11 10*3/mm3      Monocytes, Absolute 0.67 10*3/mm3      Eosinophils, Absolute 0.05 10*3/mm3      Basophils, Absolute 0.04 10*3/mm3      Immature Grans, Absolute 0.03 10*3/mm3      nRBC 0.0 /100 WBC              Imaging:    No Radiology Exams Resulted Within Past 24 Hours      Differential Diagnosis and Discussion:    Syncope: Differential diagnosis includes but is not limited to TIA, hyperventilation, aortic stenosis, pulmonary emboli, myocardial disease, bradycardia arrhythmia, heart block, tachyarrhythmia, vasovagal, orthostatic hypotension, ruptured AAA, aortic dissection, subarachnoid hemorrhage, seizure, hypoglycemia.    PROCEDURES:    Labs were collected in the  emergency department and all labs were reviewed and interpreted by me.  An EKG was performed and the EKG was interpreted by me.    ECG 12 Lead Syncope   Preliminary Result   HEART RATE=80  bpm   RR Jugdiixa=171  ms   OH Ibufkksr=802  ms   P Horizontal Axis=-56  deg   P Front Axis=33  deg   QRSD Interval=95  ms   QT Brniqgfi=974  ms   EXoE=173  ms   QRS Axis=-36  deg   T Wave Axis=-17  deg   - ABNORMAL ECG -   Sinus rhythm   Abnormal R-wave progression, late transition   Probable left ventricular hypertrophy   Inferior infarct, age indeterminate   Date and Time of Study:2025-05-08 21:15:26          Procedures    MDM                     Patient Care Considerations:    CT HEAD: I considered ordering a noncontrast CT of the head, however patient has a normal nonfocal neurologic exam.      Consultants/Shared Management Plan:    None    Social Determinants of Health:    Patient has presented with family members who are responsible, reliable and will ensure follow up care.      Disposition and Care Coordination:    Discharged: The patient is suitable and stable for discharge with no need for consideration of admission.    I have explained the patient´s condition, diagnoses and treatment plan based on the information available to me at this time. I have answered questions and addressed any concerns. The patient has a good  understanding of the patient´s diagnosis, condition, and treatment plan as can be expected at this point. The vital signs have been stable. The patient´s condition is stable and appropriate for discharge from the emergency department.      The patient will pursue further outpatient evaluation with the primary care physician or other designated or consulting physician as outlined in the discharge instructions. They are agreeable to this plan of care and follow-up instructions have been explained in detail. The patient has received these instructions in written format and has expressed an understanding of  the discharge instructions. The patient is aware that any significant change in condition or worsening of symptoms should prompt an immediate return to this or the closest emergency department or call to 911.  I have explained discharge medications and the need for follow up with the patient/caretakers. This was also printed in the discharge instructions. Patient was discharged with the following medications and follow up:      Medication List      No changes were made to your prescriptions during this visit.      Jarek Kong Jr., MD  [address] [address]  [phone]    Schedule an appointment as soon as possible for a visit          Final diagnoses:   Syncope, unspecified syncope type        ED Disposition       ED Disposition   Discharge    Condition   Stable    Comment   --               This medical record created using voice recognition software.             Lorne Sánchez MD  05/09/25 [id number]

## 2025-05-10 LAB
QT INTERVAL: 390 MS
QTC INTERVAL: 450 MS

## 2025-05-14 ENCOUNTER — OFFICE VISIT (OUTPATIENT)
Dept: PULMONOLOGY | Facility: CLINIC | Age: 80
End: 2025-05-14
Payer: MEDICARE

## 2025-05-14 ENCOUNTER — TELEPHONE (OUTPATIENT)
Dept: CARDIOLOGY | Facility: CLINIC | Age: 80
End: 2025-05-14
Payer: MEDICARE

## 2025-05-14 VITALS
WEIGHT: 144 LBS | SYSTOLIC BLOOD PRESSURE: 139 MMHG | HEIGHT: 64 IN | RESPIRATION RATE: 16 BRPM | BODY MASS INDEX: 24.59 KG/M2 | DIASTOLIC BLOOD PRESSURE: 68 MMHG | TEMPERATURE: 97.3 F | HEART RATE: 82 BPM | OXYGEN SATURATION: 93 %

## 2025-05-14 DIAGNOSIS — Z99.2 ESRD (END STAGE RENAL DISEASE) ON DIALYSIS: ICD-10-CM

## 2025-05-14 DIAGNOSIS — R06.09 DYSPNEA ON EXERTION: ICD-10-CM

## 2025-05-14 DIAGNOSIS — J96.11 CHRONIC RESPIRATORY FAILURE WITH HYPOXIA: Primary | ICD-10-CM

## 2025-05-14 DIAGNOSIS — I48.0 PAROXYSMAL A-FIB: ICD-10-CM

## 2025-05-14 DIAGNOSIS — N18.6 ESRD (END STAGE RENAL DISEASE) ON DIALYSIS: ICD-10-CM

## 2025-05-14 NOTE — TELEPHONE ENCOUNTER
Caller: MARCUS CACERES    Relationship to patient: Emergency Contact    Best call back number: [phone]    Chief complaint: TACHYCARDIA    Type of visit: FU    Requested date: ASAP     Additional notes:PLEASE ADVISE WHEN TO SCHEDULE PT AND CALL DAUGHTER BACK TO OFFER AN APPT.

## 2025-05-14 NOTE — PROGRESS NOTES
Primary Care Provider  Jarek Kong Jr., MD   Referring Provider  No ref. provider found      Patient Complaint  Follow-up (Wants EKG), Shortness of Breath (On exertion. ), Atrial Fibrillation (Was in the ER and states heart went into afib./), and Hypertension      Subjective          Nelson Wang presents to [facility] PULMONARY & CRITICAL CARE MEDICINE      History of Presenting Illness  Nelson Wang is a 80 y.o. male with history of respiratory failure, ESRD on HD, recent hospitalization here for follow-up.    Patient is doing well  Currently on room air  Lost pint of blood during last hemodialysis session due to needle malfunction at the fistula  Hemoglobin dropped from 11.9 to 10.3.  Has some dyspnea on exertion but stable now  Also had an episode of A-fib with improvement after Coreg  Does not use BiPAP as it makes him feel claustrophobic  Continues on Breztri daily.  I have personally reviewed the review of systems, past family, social, medical and surgical histories; and agree with their findings.    Review of Systems  Constitutional:  No fever. No chills. No weakness.  ENT:  No sore throat, URI symptoms.   Cardiovascular:  No chest pain. No palpitations. No lower extremity edema.  Respiratory:  No shortness of breath, cough, pleuritic chest pain. No hemoptysis. No dyspnea.   GI:  Normal appetite. No nausea, vomiting, diarrhea. No melena.  Musculoskeletal:  No arthralgias or myalgias.  Neurologic:  No weakness    Family History   Problem Relation Age of Onset    Diabetes Mother     Heart disease Father     Colon cancer Sister 77    Cancer Sister 35    Diabetes Sister     Diabetes Brother     Sleep apnea Paternal Aunt     Heart disease Paternal Uncle     Sleep apnea Daughter     Malig Hyperthermia Neg Hx         Social History     Socioeconomic History    Marital status:    Tobacco Use    Smoking status: Former     Current packs/day: 0.00     Average packs/day: 1  pack/day for 10.0 years (10.0 ttl pk-yrs)     Types: Cigarettes     Start date: 1990     Quit date: 2000     Years since quittin.3     Passive exposure: Past    Smokeless tobacco: Never    Tobacco comments:     quit 25 years ago, chews nicotine gum in past   Vaping Use    Vaping status: Former   Substance and Sexual Activity    Alcohol use: Not Currently     Comment: 1 DRINK/DAY-rarely    Drug use: Never    Sexual activity: Defer        Past Medical History:   Diagnosis Date    Abnormal stress test     Anemia     IRON INFUSIONS WITH DIALYSIS    Anesthesia     WITH HIP REPLACEMENT DAUGHTER BELIEVES HAD SVT     Ankle pain, right     Anxiety and depression     Arthritis     CKD (chronic kidney disease), stage IV     DIALYSIS [id number] SHILEY IN RIGHT CHEST    Diabetes     GERD (gastroesophageal reflux disease)     Gout     High cholesterol     History of peritoneal dialysis     HL (hearing loss)     Hyperkalemia     Hypertension     Hypothyroidism     Insomnia     Night terrors     Pleural effusion     Psoriasis     Psoriasis     Sleep apnea     Sleep walking     Thrombocytopenia     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    Vitiligo         Immunization History   Administered Date(s) Administered    Arexvy (RSV, Adults 60+ yrs) 2023    COVID-19 (PFIZER) 12YRS+ (COMIRNATY) 2023, 2024    COVID-19 (PFIZER) BIVALENT 12+YRS 2022, 2023    COVID-19 (PFIZER) Purple Cap Monovalent 2021, 10/04/2021, 2022    Fluad Quad 65+ 2023    Fluzone High-Dose 65+YRS 2024    Fluzone High-Dose 65+yrs 2021    Hepatitis B 2 Dose Vaccine Heplisav-B 2023, 2023, 2023, 2023    Influenza, Unspecified 2021, 2022    Pneumococcal Conjugate 20-Valent (PCV20) 2025       No Known Allergies       Current Outpatient Medications:     allopurinol (ZYLOPRIM) 300 MG tablet, Take 1 tablet by mouth Daily., Disp: 90 tablet, Rfl: 2     atorvastatin (LIPITOR) 40 MG tablet, Take 1 tablet by mouth Daily., Disp: 90 tablet, Rfl: 1    Budeson-Glycopyrrol-Formoterol (Breztri Aerosphere) 160-9-4.8 MCG/ACT aerosol inhaler, Inhale 2 puffs 2 (Two) Times a Day., Disp: 10.7 g, Rfl: 10    Calcium Carbonate-Vitamin D (Oyster Shell Calcium/D) 500-5 MG-MCG tablet, Take 1 tablet by mouth., Disp: , Rfl:     famotidine (Pepcid) 20 MG tablet, Take 1 tablet by mouth Daily., Disp: 90 tablet, Rfl: 2    fluticasone (Flonase Allergy Relief) 50 MCG/ACT nasal spray, Administer 2 sprays into the nostril(s) as directed by provider Daily., Disp: , Rfl:     guaiFENesin (Mucinex) 600 MG 12 hr tablet, Take 1 tablet by mouth Daily., Disp: , Rfl:     hydrALAZINE (APRESOLINE) 25 MG tablet, Take 1 tablet by mouth 3 (Three) Times a Day As Needed (SBP>160)., Disp: 90 tablet, Rfl: 0    hydrocortisone (CORTEF) 10 MG tablet, Take 1 tablet by mouth Daily., Disp: , Rfl:     hydrocortisone (CORTEF) 10 MG tablet, Take 1.5 tablets by mouth 2 (Two) Times a Day, in AM and at 2 PM., Disp: 270 tablet, Rfl: 3    Hydrocortisone Sod Suc, PF, (Solu-CORTEF) 100 MG injection, Inject 2 mLs into the muscle as directed for 1 dose 100 mg ONCE intramuscular in emergency use as directed.., Disp: 1 each, Rfl: 0    levothyroxine (SYNTHROID, LEVOTHROID) 150 MCG tablet, Take 1 tablet by mouth daily., Disp: 90 tablet, Rfl: 1    Methoxy PEG-Epoetin Beta (MIRCERA IJ), 200 mcg., Disp: , Rfl:     Omega-3 Fatty Acids (fish oil) 1000 MG capsule capsule, Take 1 capsule by mouth Daily With Breakfast., Disp: , Rfl:     QUEtiapine (SEROquel) 25 MG tablet, Take 1 tablet by mouth 2 times a day as needed for agitation and 2 tablets by mouth at bedtime for insomnia, Disp: 120 tablet, Rfl: 2    sertraline (ZOLOFT) 50 MG tablet, Take 2 tablets by mouth Daily., Disp: , Rfl:     sevelamer (RENVELA) 800 MG tablet, 1 tablet 3 (Three) Times a Day., Disp: , Rfl:     sodium zirconium cyclosilicate (Lokelma) 5 g packet, Take 5 g by mouth  "Every Other Day., Disp: 15 each, Rfl: 3    Syringe/Needle, Disp, (Monoject Magellan Syringe) 23G X 1\" 1 ML misc, Use 1 each as directed For emergency use for hydrocortisone injection.., Disp: 10 each, Rfl: 0    TURMERIC PO, Take  by mouth., Disp: , Rfl:      Objective     Vital Signs:   /68 (BP Location: Left arm, Patient Position: Sitting, Cuff Size: Adult)   Pulse 82   Temp 97.3 °F (36.3 °C)   Resp 16   Ht 162.6 cm (64\")   Wt 65.3 kg (144 lb)   SpO2 93%   BMI 24.72 kg/m²     Physical Exam  Vital Signs Reviewed   WDWN, Alert, NAD. In wheelchair.   HEENT:  EOMI.  nares clear  Neck:  Supple, no JVD, no thyromegaly  Chest:  mild L crackles noted to auscultation, no wheezes, no work of breathing noted room air  CV: RRR, no M/G/R, pulses 2+, equal.  Abd:  Soft, NT, ND, +BS  EXT:  no clubbing, no cyanosis, no edema  Neuro:  A&Ox3, CN grossly intact, no focal deficits.  Skin: No rashes or lesions noted       Result Review :   I have personally reviewed patient's labs and images.              Patient Active Problem List   Diagnosis    Essential hypertension    Bradycardia, sinus    Anemia due to chronic kidney disease    Hypothyroidism    Idiopathic gout    Proteinuria    Psoriasis    Thrombocytopenia    Type 2 diabetes mellitus without complication    CKD (chronic kidney disease), stage IV    Hyperlipidemia    Monoclonal gammopathy of unknown significance (MGUS)    Stage 5 chronic kidney disease    Chronic gout without tophus    Peritoneal dialysis catheter dysfunction    Medicare annual wellness visit, subsequent    Chronic cough    Peritonitis associated with peritoneal dialysis    Recurrent pneumonia    Acute on chronic respiratory failure with hypoxia    ESRD (end stage renal disease)    Aspiration pneumonitis    Sepsis    Type 2 diabetes mellitus, with long-term current use of insulin    GERD without esophagitis    Immunosuppression due to drug therapy    Bipolar disorder    Chronic respiratory failure " with hypoxia    Shortness of breath    Abnormal stress test    ESRD on dialysis    Abnormal chest CT    Encounter for screening for malignant neoplasm of colon    History of colon polyps    Family history of colon cancer    Intractable pain    Abdominal pain    ESRD (end stage renal disease) on dialysis    AMS (altered mental status)    Hallucinations    LUQ pain    Discitis    Metabolic encephalopathy    Aspiration pneumonia    Discitis of lumbar region    Severe malnutrition    Dementia    Unspecified severe protein-calorie malnutrition    Hypoxia    Gram-positive bacteremia    Moderate protein-calorie malnutrition    Vitiligo    Hashimoto's disease       Impression and Plan    Dyspnea on exertion, stable   History of hypoxic and hypercarbic respiratory failure  History of CO2 narcosis  History of toxic/metabolic encephalopathy  ESRD on HD  Heart failure with preserved EF  Paroxysmal A-fib, not on AC    -Patient is doing well, not needing oxygen  -Continue Brovana, Pulmicort nebulizers  -Continue Breztri twice daily  -Chest x-ray 4/4 shows stability  -Does not use CPAP  -Recommend discussing with cardiology about anticoagulation as patient does have proximal A-fib.  -Follow-up in 4-5 months or earlier as needed    Smoking status: Reviewed  Vaccination status: Patient is up-to-date with his vaccinations at this time  Medications personally reviewed    Follow Up   No follow-ups on file.  Patient was given instructions and counseling regarding his condition or for health maintenance advice. Please see specific information pulled into the AVS if appropriate.

## 2025-05-15 ENCOUNTER — OFFICE VISIT (OUTPATIENT)
Dept: CARDIOLOGY | Facility: CLINIC | Age: 80
End: 2025-05-15
Payer: MEDICARE

## 2025-05-15 VITALS
SYSTOLIC BLOOD PRESSURE: 143 MMHG | HEART RATE: 80 BPM | BODY MASS INDEX: 25.03 KG/M2 | HEIGHT: 64 IN | DIASTOLIC BLOOD PRESSURE: 70 MMHG | WEIGHT: 146.6 LBS

## 2025-05-15 DIAGNOSIS — E78.2 MIXED DYSLIPIDEMIA: ICD-10-CM

## 2025-05-15 DIAGNOSIS — I10 ESSENTIAL HYPERTENSION: ICD-10-CM

## 2025-05-15 DIAGNOSIS — I48.0 PAROXYSMAL ATRIAL FIBRILLATION: Primary | ICD-10-CM

## 2025-05-15 NOTE — PROGRESS NOTES
Chief Complaint  Follow-up, Shortness of Breath (tachycardia), Hypertension, and Slow Heart Rate    Subjective        History of Present Illness  Nelson Wang presents to [facility] CARDIOLOGY for follow up.   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 He had previous heart cath that demonstrated minimal CAD. He recently had a prolonged hospital stay due to hypoxia, complicated by cardiac arrest, likely secondary to hypoxia and pulmonary hemorrhage. During this hospitalization he was found to have P.afib. He was not placed on anticoagulation due to risk for falls, and anemia.    Since being home patient has been participating in rehabilitation. He reports his strength is improving. He did have a recent episode after hemodialysis where he suffered a large amount of bleeding resulting in significant drop in Hgb and since then he has had some shortness of breath. He is getting iron infusions since this episode. He has no chest pain or discomfort. He can feel flutters in his chest occasionally but they are not bothersome for him. He has no edema, or syncope. He does also report some dizziness with his flutters. He notes he mostly goes into afib during dialysis. He is on home dialysis 5 days a week.He is present with his daughter who is a physician as well.    Past Medical History:   Diagnosis Date    Abnormal stress test     Adrenal insufficiency 01/2025    Anemia     IRON INFUSIONS WITH DIALYSIS    Anesthesia     WITH HIP REPLACEMENT DAUGHTER BELIEVES HAD SVT 2000    Ankle pain, right     Anxiety and depression     Arthritis     CKD (chronic kidney disease), stage IV     DIALYSIS [id number] SHILEY IN RIGHT CHEST    Diabetes     GERD (gastroesophageal reflux disease)     Gout     High cholesterol     History of peritoneal dialysis      HL (hearing loss)     Hyperkalemia     Hypertension     Hypothyroidism     Insomnia     Night terrors     Pleural effusion     Psoriasis     Psoriasis     Sleep apnea     Sleep walking     Thrombocytopenia     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    Vitiligo        ALLERGY  No Known Allergies     Past Surgical History:   Procedure Laterality Date    ABDOMINAL SURGERY      ANKLE SURGERY Right 11/1990    APPENDECTOMY N/A 1954    ARTERIOVENOUS FISTULA/SHUNT SURGERY Left 07/16/2024    Procedure: Creation of left arm arteriovenous fistula;  Surgeon: Moses Mir MD;  Location: [facility] MAIN OR;  Service: Vascular;  Laterality: Left;    BRONCHOSCOPY N/A 03/01/2024    Procedure: BRONCHOSCOPY WITH BAL AND WASHINGS;  Surgeon: Sandra Espinal MD;  Location: [facility] ENDOSCOPY;  Service: Pulmonary;  Laterality: N/A;  PNEUMONIA    BRONCHOSCOPY N/A 08/30/2024    Procedure: BRONCHOSCOPY WITH BALS AND WASHINGS;  Surgeon: Sandra Espinal MD;  Location: [facility] ENDOSCOPY;  Service: Pulmonary;  Laterality: N/A;  MUCOUS PLUGGING    BRONCHOSCOPY      CARDIAC CATHETERIZATION N/A 05/29/2024    Procedure: Left Heart Cath with possible coronary angioplasty;  Surgeon: Stephan Nichols MD;  Location: [facility] CATH INVASIVE LOCATION;  Service: Cardiology;  Laterality: N/A;    COLONOSCOPY N/A 06/2022    [facility]    ENDOSCOPY N/A 10/28/2024    Procedure: ESOPHAGOGASTRODUODENOSCOPY INSERTION OF LIGHTED INSTRUMENT TO VIEW ESOPHAGUS, STOMACH AND SMALL INTESTINE;  Surgeon: Kingsley Peraza MD;  Location: [facility] ENDOSCOPY;  Service: Gastroenterology;  Laterality: N/A;  Gastritis    FRACTURE SURGERY      HIP BIPOLAR REPLACEMENT Right 01/2000    INSERTION HEMODIALYSIS CATHETER Left 04/09/2024    Procedure: HEMODIALYSIS CATHETER INSERTION;  Surgeon: Jose Berry MD;  Location: [facility] OR;  Service: General;  Laterality: Left;    INSERTION HEMODIALYSIS CATHETER N/A 04/12/2024    Procedure: Tunneled hemodialysis catheter  insertion;  Surgeon: Enrique Vinson MD;  Location: [facility] MAIN OR;  Service: Vascular;  Laterality: N/A;    INSERTION PERITONEAL DIALYSIS CATHETER N/A 2023    Procedure: LAPAROSCOPIC INSERTION PERITONEAL DIALYSIS CATHETER, LAPAROSCOPIC OMENTOPEXY WITH LYSIS OF ADHESIONS;  Surgeon: Jose Berry MD;  Location: [facility] MAIN OR;  Service: General;  Laterality: N/A;    INSERTION PERITONEAL DIALYSIS CATHETER Left 2023    Procedure: REVISION OF PERITONEAL DIALYSIS CATHETER;  Surgeon: Radha Oreilly MD;  [facility] MA[facility] OR;  Service: General;  Laterality: Left;    JOINT REPLACEMENT      REMOVAL PERITONEAL DIALYSIS CATHETER N/A 2024    Procedure: REMOVAL PERITONEAL DIALYSIS CATHETER;  Surgeon: Jose Berry MD;  Location: [facility] MAIN OR;  Service: General;  Laterality: N/A;    RENAL BIOPSY Left 07/15/2022    UPPER GASTROINTESTINAL ENDOSCOPY      VASCULAR SURGERY      WRIST SURGERY      UNSURE WHICH SIDE DAUGHTER REPORTS HAD SEVERE  CUT FROM WINDOW AND THEY HAD TO DO RECONSTRUCTIVE SURGERY WITH VESSELS AND NERVES        Social History     Socioeconomic History    Marital status:    Tobacco Use    Smoking status: Former     Current packs/day: 0.00     Average packs/day: 1 pack/day for 10.0 years (10.0 ttl pk-yrs)     Types: Cigarettes     Start date: 1990     Quit date:      Years since quittin.3     Passive exposure: Past    Smokeless tobacco: Never    Tobacco comments:     quit 25 years ago, chews nicotine gum in past   Vaping Use    Vaping status: Former   Substance and Sexual Activity    Alcohol use: Not Currently    Drug use: Never    Sexual activity: Defer       Family History   Problem Relation Age of Onset    Diabetes Mother     Heart disease Father     Colon cancer Sister 77    Cancer Sister 35    Diabetes Sister     Diabetes Brother     Sleep apnea Paternal Aunt     Heart disease Paternal Uncle     Sleep apnea Daughter     Malig Hyperthermia  "Neg Hx         Current Outpatient Medications on File Prior to Visit   Medication Sig    atorvastatin (LIPITOR) 40 MG tablet Take 1 tablet by mouth Daily.    Budeson-Glycopyrrol-Formoterol (Breztri Aerosphere) 160-9-4.8 MCG/ACT aerosol inhaler Inhale 2 puffs 2 (Two) Times a Day.    famotidine (Pepcid) 20 MG tablet Take 1 tablet by mouth Daily.    fluticasone (Flonase Allergy Relief) 50 MCG/ACT nasal spray Administer 2 sprays into the nostril(s) as directed by provider Daily.    guaiFENesin (Mucinex) 600 MG 12 hr tablet Take 1 tablet by mouth Daily.    hydrocortisone (CORTEF) 10 MG tablet Take 1 tablet by mouth Daily. (Patient taking differently: Take 1.5 tablets by mouth 2 (Two) Times a Day.)    Hydrocortisone Sod Suc, PF, (Solu-CORTEF) 100 MG injection Inject 2 mLs into the muscle as directed for 1 dose 100 mg ONCE intramuscular in emergency use as directed..    levothyroxine (SYNTHROID, LEVOTHROID) 150 MCG tablet Take 1 tablet by mouth daily.    Methoxy PEG-Epoetin Beta (MIRCERA IJ) 200 mcg.    Omega-3 Fatty Acids (fish oil) 1000 MG capsule capsule Take 1 capsule by mouth Daily With Breakfast.    QUEtiapine (SEROquel) 25 MG tablet Take 1 tablet by mouth 2 times a day as needed for agitation and 2 tablets by mouth at bedtime for insomnia (Patient taking differently: Take 1 tablet by mouth Daily.)    sertraline (ZOLOFT) 50 MG tablet Take 2 tablets by mouth Daily.    sevelamer (RENVELA) 800 MG tablet 1 tablet 3 (Three) Times a Day. (Patient taking differently: 1 tablet 2 (Two) Times a Day.)    sodium zirconium cyclosilicate (Lokelma) 5 g packet Take 5 g by mouth Every Other Day.    Syringe/Needle, Disp, (Monoject Magellan Syringe) 23G X 1\" 1 ML misc Use 1 each as directed For emergency use for hydrocortisone injection..    TURMERIC PO Take  by mouth.    allopurinol (ZYLOPRIM) 300 MG tablet Take 1 tablet by mouth Daily. (Patient not taking: Reported on 5/15/2025)    Calcium Carbonate-Vitamin D (Oyster Shell Calcium/D) " "500-5 MG-MCG tablet Take 1 tablet by mouth. (Patient not taking: Reported on 5/15/2025)    hydrALAZINE (APRESOLINE) 25 MG tablet Take 1 tablet by mouth 3 (Three) Times a Day As Needed (SBP>160). (Patient not taking: Reported on 5/15/2025)    hydrocortisone (CORTEF) 10 MG tablet Take 1.5 tablets by mouth 2 (Two) Times a Day, in AM and at 2 PM.     No current facility-administered medications on file prior to visit.       Objective   Vitals:    05/15/25 0930   BP: 143/70   BP Location: Right arm   Patient Position: Sitting   Cuff Size: Small Adult   Pulse: 80   Weight: 66.5 kg (146 lb 9.6 oz)   Height: 162.6 cm (64.02\")       Physical Exam  Constitutional:       General: He is awake. He is not in acute distress.     Appearance: Normal appearance.   HENT:      Head: Normocephalic.      Nose: Nose normal. No congestion.   Eyes:      Extraocular Movements: Extraocular movements intact.      Conjunctiva/sclera: Conjunctivae normal.      Pupils: Pupils are equal, round, and reactive to light.   Neck:      Thyroid: No thyromegaly.      Vascular: No JVD.   Cardiovascular:      Rate and Rhythm: Normal rate and regular rhythm.      Chest Wall: PMI is not displaced.      Pulses: Normal pulses.      Heart sounds: Normal heart sounds, S1 normal and S2 normal. No murmur heard.     No friction rub. No gallop. No S3 or S4 sounds.   Pulmonary:      Effort: Pulmonary effort is normal.      Breath sounds: Normal breath sounds. No wheezing, rhonchi or rales.   Abdominal:      General: Bowel sounds are normal.      Palpations: Abdomen is soft.      Tenderness: There is no abdominal tenderness.   Musculoskeletal:      Cervical back: No tenderness.      Right lower leg: No edema.      Left lower leg: No edema.   Lymphadenopathy:      Cervical: No cervical adenopathy.   Skin:     General: Skin is warm and dry.      Capillary Refill: Capillary refill takes less than 2 seconds.      Coloration: Skin is not cyanotic.      Findings: No " petechiae or rash.      Nails: There is no clubbing.   Neurological:      Mental Status: He is alert.   Psychiatric:         Mood and Affect: Mood normal.         Behavior: Behavior is cooperative.           Result Review     The following data was reviewed by BECKY Guan on 05/15/25.      CMP          2/12/2025    00:00 2/26/2025    00:00 5/8/2025    21:11   CMP   Glucose   204    BUN   47    Creatinine   4.89    EGFR   11.4    Sodium   137    Potassium 3.7     5.2     3.4    Chloride   98    Calcium   9.1    Total Protein   6.6    Albumin   3.8    Globulin   2.8    Total Bilirubin   0.4    Alkaline Phosphatase   85    AST (SGOT)   24    ALT (SGPT)   18    Albumin/Globulin Ratio   1.4    BUN/Creatinine Ratio   9.6    Anion Gap   13.5       Details          This result is from an external source.             CBC w/diff          3/20/2025    15:39 5/8/2025    21:11 5/10/2025    00:00   CBC w/Diff   WBC 5.84  8.44     RBC 4.28  4.05     Hemoglobin 12.3  11.9  10.3        30.9       Hematocrit 39.5  38.7     MCV 92.3  95.6     MCH 28.7  29.4     MCHC 31.1  30.7     RDW 14.5  15.6     Platelets 135  121     Neutrophil Rel % 59.0  65.6     Immature Granulocyte Rel % 0.2  0.4     Lymphocyte Rel % 29.1  25.0     Monocyte Rel % 9.9  7.9     Eosinophil Rel % 1.5  0.6     Basophil Rel % 0.3  0.5        Details          This result is from an external source.              Lipid Panel          7/16/2024    08:07 1/13/2025    03:11   Lipid Panel   Total Cholesterol 138  124    Triglycerides 111  136    HDL Cholesterol 56  31    VLDL Cholesterol 20  24    LDL Cholesterol  62  69    LDL/HDL Ratio 1.07  2.12        Results for orders placed during the hospital encounter of 01/11/25    Adult Transthoracic Echo Limited W/ Cont if Necessary Per Protocol    Interpretation Summary    Left ventricular ejection fraction appears to be 56 - 60%.  No obvious wall motion abnormalities.    Some type of catheter appears in the  right atrium.    This is a limited echo for ejection fraction.    Results for orders placed during the hospital encounter of 05/17/24    Stress Test With Myocardial Perfusion One Day    Interpretation Summary  Patient received Lexiscan 0.4 mg IV infusion over 10 seconds.  Baseline ECG shows normal sinus rhythm.  At peak stress, no ischemic ST-T changes were noted.  No significant arrhythmias were seen.  Myocardial perfusion was abnormal demonstrating medium area of severe perfusion defect in the distal inferior/inferoapical segment with minimal reversibility on resting images, more likely related to attenuation artifact.  A small area of mild to moderate perfusion defect was noted in the distal anterior segment with reversibility on resting images which could represent myocardial ischemia in the right clinical setting.  The left ventricular was normal in size with moderately reduced systolic function.  Calculated LVEF is 36%.  Hypokinesis of the aforementioned segments was noted.  Impressions are consistent with high risk study.  Consider further evaluation if clinically indicated.    No results found for this or any previous visit.          Procedures      Assessment & Plan  Paroxysmal atrial fibrillation  Newly diagnosed paroxysmal atrial fibrillation. Maintaining a normal rhythm on exam today. Not on anticoagulation due to risk for falls, anemia. Patients daughter has noted that his heart rate has been elevated lately when he does go into afib but he is not able to tolerate beta blocker due to hypotension secondary to adrenal insufficiency, on steroid therapy. We will check a 7 day holter to assess afib burden. I reached out to  to see if patient might be a candidate for watchman device implantation. He is happy to see patient for further evaluation. Patient may benefit from antiarrythmic pending results of holter since he can not tolerate beta blockade. Discussed also with  who is in  agreement with the plan.  Essential hypertension  Fair. Continue current regimen.  Mixed dyslipidemia  Continue statin therapy.                     Follow Up   Return in about 4 weeks (around 6/12/2025) for With Dr. Nichols.    Patient was given instructions and counseling regarding his condition or for health maintenance advice. Please see specific information pulled into the AVS if appropriate.     Aylin Nicole, APRN  05/15/25  13:47 EDT    Dictated Utilizing Dragon Dictation

## 2025-05-16 PROBLEM — A41.9 SEPSIS: Status: RESOLVED | Noted: 2024-04-09 | Resolved: 2025-05-16

## 2025-05-16 PROBLEM — R78.81 GRAM-POSITIVE BACTEREMIA: Status: RESOLVED | Noted: 2024-12-28 | Resolved: 2025-05-16

## 2025-05-16 PROBLEM — J96.21 ACUTE ON CHRONIC RESPIRATORY FAILURE WITH HYPOXIA: Status: RESOLVED | Noted: 2024-02-29 | Resolved: 2025-05-16

## 2025-05-16 PROBLEM — T85.71XA PERITONITIS ASSOCIATED WITH PERITONEAL DIALYSIS: Status: RESOLVED | Noted: 2024-02-16 | Resolved: 2025-05-16

## 2025-05-16 PROBLEM — E27.40 ADRENAL INSUFFICIENCY: Status: ACTIVE | Noted: 2025-05-16

## 2025-05-16 PROBLEM — E78.2 MIXED HYPERLIPIDEMIA: Status: ACTIVE | Noted: 2021-08-25

## 2025-05-27 ENCOUNTER — OFFICE VISIT (OUTPATIENT)
Dept: ORTHOPEDIC SURGERY | Facility: CLINIC | Age: 80
End: 2025-05-27
Payer: MEDICARE

## 2025-05-27 ENCOUNTER — RESULTS FOLLOW-UP (OUTPATIENT)
Dept: CARDIOLOGY | Facility: CLINIC | Age: 80
End: 2025-05-27
Payer: MEDICARE

## 2025-05-27 VITALS — WEIGHT: 146 LBS | HEIGHT: 64 IN | BODY MASS INDEX: 24.92 KG/M2

## 2025-05-27 DIAGNOSIS — M25.551 RIGHT HIP PAIN: Primary | ICD-10-CM

## 2025-05-27 DIAGNOSIS — S32.000A COMPRESSION FRACTURE OF LUMBAR VERTEBRA, UNSPECIFIED LUMBAR VERTEBRAL LEVEL, INITIAL ENCOUNTER: ICD-10-CM

## 2025-05-27 DIAGNOSIS — S72.144D CLOSED NONDISPLACED INTERTROCHANTERIC FRACTURE OF RIGHT FEMUR WITH ROUTINE HEALING, SUBSEQUENT ENCOUNTER: ICD-10-CM

## 2025-05-27 NOTE — PROGRESS NOTES
"Chief Complaint  Pain and Follow-up of the Right Hip       Subjective      Nelson Wang presents to [facility] for a follow up for his right hip. He has been treating his right femur nondisplaced fracture conservatively. He is ambulating today without any assistance. He is overall doing well and denies any pain to his right hip. He denies any new injury or falls since his last visit. . He is currently on dialysis.     No Known Allergies     Social History     Socioeconomic History    Marital status:    Tobacco Use    Smoking status: Former     Current packs/day: 0.00     Average packs/day: 1 pack/day for 10.0 years (10.0 ttl pk-yrs)     Types: Cigarettes     Start date: 1990     Quit date:      Years since quittin.     Passive exposure: Past    Smokeless tobacco: Never    Tobacco comments:     quit 25 years ago, chews nicotine gum in past   Vaping Use    Vaping status: Former   Substance and Sexual Activity    Alcohol use: Not Currently    Drug use: Never    Sexual activity: Defer        I reviewed the patient's chief complaint, history of present illness, review of systems, past medical history, surgical history, family history, social history, medications, and allergy list.     Review of Systems     Constitutional: Denies fevers, chills, weight loss  Cardiovascular: Denies chest pain, shortness of breath  Skin: Denies rashes, acute skin changes  Neurologic: Denies headache, loss of consciousness  MSK: right hip pain       Vital Signs:   Ht 162.6 cm (64\")   Wt 66.2 kg (146 lb)   BMI 25.06 kg/m²            Ortho Exam    Physical Exam  General:Alert. No acute distress   Right lower extremity: hip flexion  to 95 degrees, external rotation  to 35 degrees, internal rotation to 20 degrees, well healed surgicial incision, negative  straight leg raise, neurovascularly intact , positive  pulses, positive EHL, FHL, GS, and TA. Sensation intact to all 5 nerves of the " foot.     Procedures    X-Ray Report:  Right hip X-Ray  Indication: Evaluation of right hip pain   AP/Lateral view(s)  Findings: intact modular hip arthroplasty, no signs of hardware loosening or subsidence,  healed proximal femur giulia prostatic fracture   Prior studies available for comparison: yes       Imaging Results (Most Recent)       Procedure Component Value Units Date/Time    XR Hip With or Without Pelvis 2 - 3 View Right [[id number]] Resulted: 05/27/25 1610     Updated: 05/27/25 1612             Result Review :       Holter Monitor - 72 Hour Up To 15 Days  Result Date: 5/23/2025  Narrative:   Patient was monitored for 2 days, 20 hours and 54 minutes.   Lowest heart rate of 72 bpm, average heart rate was 87 bpm, maximum heart rate was 137 bpm.   Rare PAC, couplet.  Rare PVC, couplet.   Occasional episode of atrial fibrillation lasting up to 32 minutes and 51 seconds.   4 patient activated events which correspond to normal sinus rhythm.              Assessment and Plan     Diagnoses and all orders for this visit:    1. Right hip pain (Primary)  -     XR Hip With or Without Pelvis 2 - 3 View Right    2. Closed nondisplaced intertrochanteric fracture of right femur with routine healing, subsequent encounter    3. Compression fracture of lumbar vertebra, unspecified lumbar vertebral level, initial encounter      The patient presents here today for a follow up for his right hip. X-rays were obtained in the office today and these were reviewed today.     He is overall doing well and denies any pain to his right hip. He will continue activities as tolerated and current medications for pain control.     Referral placed today for Dr. Bowman office.     Call or return if worsening symptoms.    Follow Up     PRN     Patient was given instructions and counseling regarding his condition or for health maintenance advice. Please see specific information pulled into the AVS if appropriate.     Scribed for Shiraz ESPINOSA  MD Trish by Alexandria Bae.  05/27/25   16:18 EDT    I have personally performed the services described in this document as scribed by the above individual and it is both accurate and complete. Shiraz Chambers MD 05/27/25

## 2025-05-27 NOTE — PROGRESS NOTES
Notify patient Holter monitor results demonstrate occasional episodes of atrial fibrillation lasting up to 32 minutes and 51 seconds.  Average heart rate was 87 which is well-controlled.  Patient triggered events correlated to a normal rhythm.  Continue with scheduled follow-up.

## 2025-06-05 ENCOUNTER — TRANSCRIBE ORDERS (OUTPATIENT)
Dept: LAB | Facility: HOSPITAL | Age: 80
End: 2025-06-05
Payer: MEDICARE

## 2025-06-05 ENCOUNTER — LAB (OUTPATIENT)
Dept: LAB | Facility: HOSPITAL | Age: 80
End: 2025-06-05
Payer: MEDICARE

## 2025-06-05 DIAGNOSIS — N18.6 END STAGE KIDNEY DISEASE: ICD-10-CM

## 2025-06-05 DIAGNOSIS — N18.6 END STAGE KIDNEY DISEASE: Primary | ICD-10-CM

## 2025-06-05 LAB
ANION GAP SERPL CALCULATED.3IONS-SCNC: 15 MMOL/L (ref 5–15)
BASOPHILS # BLD AUTO: 0.03 10*3/MM3 (ref 0–0.2)
BASOPHILS NFR BLD AUTO: 0.4 % (ref 0–1.5)
BUN SERPL-MCNC: 43 MG/DL (ref 8–23)
BUN/CREAT SERPL: 8.1 (ref 7–25)
CALCIUM SPEC-SCNC: 9.7 MG/DL (ref 8.6–10.5)
CHLORIDE SERPL-SCNC: 91 MMOL/L (ref 98–107)
CO2 SERPL-SCNC: 25 MMOL/L (ref 22–29)
CREAT SERPL-MCNC: 5.31 MG/DL (ref 0.76–1.27)
DEPRECATED RDW RBC AUTO: 53.8 FL (ref 37–54)
EGFRCR SERPLBLD CKD-EPI 2021: 10.3 ML/MIN/1.73
EOSINOPHIL # BLD AUTO: 0.05 10*3/MM3 (ref 0–0.4)
EOSINOPHIL NFR BLD AUTO: 0.7 % (ref 0.3–6.2)
ERYTHROCYTE [DISTWIDTH] IN BLOOD BY AUTOMATED COUNT: 14.8 % (ref 12.3–15.4)
FERRITIN SERPL-MCNC: 1100 NG/ML (ref 30–400)
GLUCOSE SERPL-MCNC: 301 MG/DL (ref 65–99)
HCT VFR BLD AUTO: 38.1 % (ref 37.5–51)
HGB BLD-MCNC: 11.6 G/DL (ref 13–17.7)
IMM GRANULOCYTES # BLD AUTO: 0.02 10*3/MM3 (ref 0–0.05)
IMM GRANULOCYTES NFR BLD AUTO: 0.3 % (ref 0–0.5)
LYMPHOCYTES # BLD AUTO: 1.4 10*3/MM3 (ref 0.7–3.1)
LYMPHOCYTES NFR BLD AUTO: 20.8 % (ref 19.6–45.3)
MCH RBC QN AUTO: 30 PG (ref 26.6–33)
MCHC RBC AUTO-ENTMCNC: 30.4 G/DL (ref 31.5–35.7)
MCV RBC AUTO: 98.4 FL (ref 79–97)
MONOCYTES # BLD AUTO: 0.59 10*3/MM3 (ref 0.1–0.9)
MONOCYTES NFR BLD AUTO: 8.8 % (ref 5–12)
NEUTROPHILS NFR BLD AUTO: 4.63 10*3/MM3 (ref 1.7–7)
NEUTROPHILS NFR BLD AUTO: 69 % (ref 42.7–76)
NRBC BLD AUTO-RTO: 0 /100 WBC (ref 0–0.2)
PLATELET # BLD AUTO: 145 10*3/MM3 (ref 140–450)
PMV BLD AUTO: 11.3 FL (ref 6–12)
POTASSIUM SERPL-SCNC: 4.3 MMOL/L (ref 3.5–5.2)
PTH-INTACT SERPL-MCNC: 337 PG/ML (ref 15–65)
RBC # BLD AUTO: 3.87 10*6/MM3 (ref 4.14–5.8)
SODIUM SERPL-SCNC: 131 MMOL/L (ref 136–145)
WBC NRBC COR # BLD AUTO: 6.72 10*3/MM3 (ref 3.4–10.8)

## 2025-06-05 PROCEDURE — 80048 BASIC METABOLIC PNL TOTAL CA: CPT

## 2025-06-05 PROCEDURE — 83540 ASSAY OF IRON: CPT

## 2025-06-05 PROCEDURE — 83970 ASSAY OF PARATHORMONE: CPT

## 2025-06-05 PROCEDURE — 82728 ASSAY OF FERRITIN: CPT

## 2025-06-05 PROCEDURE — 84466 ASSAY OF TRANSFERRIN: CPT

## 2025-06-05 PROCEDURE — 36415 COLL VENOUS BLD VENIPUNCTURE: CPT

## 2025-06-05 PROCEDURE — 85025 COMPLETE CBC W/AUTO DIFF WBC: CPT

## 2025-06-09 LAB
IRON SATN MFR SERPL: 21 % SATURATION
IRON SERPL-MCNC: 61 UG/DL
TRANSFERRIN SERPL-MCNC: 211 MG/DL

## 2025-06-12 ENCOUNTER — OFFICE VISIT (OUTPATIENT)
Dept: CARDIOLOGY | Facility: CLINIC | Age: 80
End: 2025-06-12
Payer: MEDICARE

## 2025-06-12 VITALS
WEIGHT: 150 LBS | BODY MASS INDEX: 25.61 KG/M2 | SYSTOLIC BLOOD PRESSURE: 126 MMHG | DIASTOLIC BLOOD PRESSURE: 71 MMHG | HEIGHT: 64 IN | HEART RATE: 100 BPM

## 2025-06-12 DIAGNOSIS — I10 PRIMARY HYPERTENSION: ICD-10-CM

## 2025-06-12 DIAGNOSIS — E78.2 MIXED HYPERLIPIDEMIA: ICD-10-CM

## 2025-06-12 DIAGNOSIS — I25.10 MILD CAD: Primary | ICD-10-CM

## 2025-06-12 DIAGNOSIS — I48.0 PAROXYSMAL A-FIB: ICD-10-CM

## 2025-06-12 PROCEDURE — 99214 OFFICE O/P EST MOD 30 MIN: CPT | Performed by: INTERNAL MEDICINE

## 2025-06-12 PROCEDURE — 3074F SYST BP LT 130 MM HG: CPT | Performed by: INTERNAL MEDICINE

## 2025-06-12 PROCEDURE — 3078F DIAST BP <80 MM HG: CPT | Performed by: INTERNAL MEDICINE

## 2025-06-12 NOTE — PROGRESS NOTES
Chief Complaint  Atrial Fibrillation, Hypertension (1m Follow Up), and Dizziness    Subjective        Nelson Wang presents to [facility]  History of present illness:    Patient does note when he stands up especially after dialysis that his heart rate will increase and his blood pressure will drop.  He will get some cramping sensation in his belly along with some lower back pain.  He states if he is sitting down he is fine.  He also notes if he drank some juice at the time of dialysis he did not have any symptoms.  He is a little nervous that his heart rate is on the higher end of normal range.  He notes no real palpitations.      Past Medical History:   Diagnosis Date    Abnormal stress test     Adrenal insufficiency 01/2025    Anemia     IRON INFUSIONS WITH DIALYSIS    Anesthesia     WITH HIP REPLACEMENT DAUGHTER BELIEVES HAD SVT 2000    Ankle pain, right     Anxiety and depression     Arthritis     CKD (chronic kidney disease), stage IV     DIALYSIS [id number] SHILEY IN RIGHT CHEST    Diabetes     GERD (gastroesophageal reflux disease)     Gout     High cholesterol     History of peritoneal dialysis     HL (hearing loss)     Hyperkalemia     Hypertension     Hypothyroidism     Insomnia     Night terrors     Pleural effusion     Psoriasis     Psoriasis     Sleep apnea     Sleep walking     Thrombocytopenia     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    Vitiligo          Past Surgical History:   Procedure Laterality Date    ABDOMINAL SURGERY      ANKLE SURGERY Right 11/1990    APPENDECTOMY N/A 1954    ARTERIOVENOUS FISTULA/SHUNT SURGERY Left 07/16/2024    Procedure: Creation of left arm arteriovenous fistula;  Surgeon: Moses Mir MD;  Location: [facility] MAIN OR;  Service: Vascular;  Laterality: Left;    BRONCHOSCOPY N/A 03/01/2024    Procedure: BRONCHOSCOPY WITH BAL AND WASHINGS;  Surgeon: Sandra Espinal MD;  Location: [facility] ENDOSCOPY;  Service: Pulmonary;  Laterality: N/A;   PNEUMONIA    BRONCHOSCOPY N/A 08/30/2024    Procedure: BRONCHOSCOPY WITH BALS AND WASHINGS;  Surgeon: Sandra Espinal MD;  Location: [facility] ENDOSCOPY;  Service: Pulmonary;  Laterality: N/A;  MUCOUS PLUGGING    BRONCHOSCOPY      CARDIAC CATHETERIZATION N/A 05/29/2024    Procedure: Left Heart Cath with possible coronary angioplasty;  Surgeon: Stephan Nichols MD;  Location: [facility] CATH INVASIVE LOCATION;  Service: Cardiology;  Laterality: N/A;    COLONOSCOPY N/A 06/2022    [facility]    ENDOSCOPY N/A 10/28/2024    Procedure: ESOPHAGOGASTRODUODENOSCOPY INSERTION OF LIGHTED INSTRUMENT TO VIEW ESOPHAGUS, STOMACH AND SMALL INTESTINE;  Surgeon: Kingsley Peraza MD;  Location: [facility] ENDOSCOPY;  Service: Gastroenterology;  Laterality: N/A;  Gastritis    FRACTURE SURGERY      HIP BIPOLAR REPLACEMENT Right 01/2000    INSERTION HEMODIALYSIS CATHETER Left 04/09/2024    Procedure: HEMODIALYSIS CATHETER INSERTION;  Surgeon: Jose Berry MD;  Location: [facility] MAIN OR;  Service: General;  Laterality: Left;    INSERTION HEMODIALYSIS CATHETER N/A 04/12/2024    Procedure: Tunneled hemodialysis catheter insertion;  Surgeon: Enrique Vinson MD;  Location: [facility] MAIN OR;  Service: Vascular;  Laterality: N/A;    INSERTION PERITONEAL DIALYSIS CATHETER N/A 03/27/2023    Procedure: LAPAROSCOPIC INSERTION PERITONEAL DIALYSIS CATHETER, LAPAROSCOPIC OMENTOPEXY WITH LYSIS OF ADHESIONS;  Surgeon: Jose Berry MD;  Location: [facility] MAIN OR;  Service: General;  Laterality: N/A;    INSERTION PERITONEAL DIALYSIS CATHETER Left 07/23/2023    Procedure: REVISION OF PERITONEAL DIALYSIS CATHETER;  Surgeon: Radha Oreilly MD;  Location: [facility] MAIN OR;  Service: General;  Laterality: Left;    JOINT REPLACEMENT      REMOVAL PERITONEAL DIALYSIS CATHETER N/A 04/09/2024    Procedure: REMOVAL PERITONEAL DIALYSIS CATHETER;  Surgeon: Jose Berry MD;  Location: [facility] MAIN OR;  Service: General;   Laterality: N/A;    RENAL BIOPSY Left 07/15/2022    UPPER GASTROINTESTINAL ENDOSCOPY      VASCULAR SURGERY      WRIST SURGERY      UNSURE WHICH SIDE DAUGHTER REPORTS HAD SEVERE  CUT FROM WINDOW AND THEY HAD TO DO RECONSTRUCTIVE SURGERY WITH VESSELS AND NERVES          Social History     Socioeconomic History    Marital status:    Tobacco Use    Smoking status: Former     Current packs/day: 0.00     Average packs/day: 1 pack/day for 10.0 years (10.0 ttl pk-yrs)     Types: Cigarettes     Start date: 1990     Quit date:      Years since quittin.4     Passive exposure: Past    Smokeless tobacco: Never    Tobacco comments:     quit 25 years ago, chews nicotine gum in past   Vaping Use    Vaping status: Former   Substance and Sexual Activity    Alcohol use: Not Currently    Drug use: Never    Sexual activity: Defer         Family History   Problem Relation Age of Onset    Diabetes Mother     Heart disease Father     Colon cancer Sister 77    Cancer Sister 35    Diabetes Sister     Diabetes Brother     Sleep apnea Paternal Aunt     Heart disease Paternal Uncle     Sleep apnea Daughter     Malig Hyperthermia Neg Hx           No Known Allergies         Current Outpatient Medications:     atorvastatin (LIPITOR) 40 MG tablet, Take 1 tablet by mouth Daily., Disp: 90 tablet, Rfl: 1    Budeson-Glycopyrrol-Formoterol (Breztri Aerosphere) 160-9-4.8 MCG/ACT aerosol inhaler, Inhale 2 puffs 2 (Two) Times a Day., Disp: 10.7 g, Rfl: 10    Calcium Carbonate-Vitamin D (Oyster Shell Calcium/D) 500-5 MG-MCG tablet, Take 1 tablet by mouth., Disp: , Rfl:     dexAMETHasone (DECADRON) 4 MG/ML injection, Inject 1 mL into the muscle once as needed (Emergency use to prevent adrenal crisis.) for up to 1 dose For emergency use if patient cannot take oral hydrocortisone and to prevent adrenal crisis.., Disp: 1 mL, Rfl: 0    famotidine (Pepcid) 20 MG tablet, Take 1 tablet by mouth Daily., Disp: 90 tablet, Rfl: 2    fluticasone  (Flonase Allergy Relief) 50 MCG/ACT nasal spray, Administer 2 sprays into the nostril(s) as directed by provider Daily., Disp: , Rfl:     guaiFENesin (Mucinex) 600 MG 12 hr tablet, Take 1 tablet by mouth Daily., Disp: , Rfl:     hydrocortisone (CORTEF) 10 MG tablet, Take 1 tablet by mouth Daily. (Patient taking differently: Take 1.5 tablets by mouth 2 (Two) Times a Day.), Disp: , Rfl:     hydrocortisone (CORTEF) 10 MG tablet, Take 1.5 tablets by mouth 2 (Two) Times a Day, in AM and at 2 PM., Disp: 270 tablet, Rfl: 3    Hydrocortisone Sod Suc, PF, (Solu-CORTEF) 100 MG injection, Inject 2 mLs into the muscle as directed for 1 dose 100 mg ONCE intramuscular in emergency use as directed.., Disp: 1 each, Rfl: 0    levothyroxine (SYNTHROID, LEVOTHROID) 150 MCG tablet, Take 1 tablet by mouth daily., Disp: 90 tablet, Rfl: 1    Methoxy PEG-Epoetin Beta (MIRCERA IJ), 200 mcg., Disp: , Rfl:     sertraline (ZOLOFT) 50 MG tablet, Take 2 tablets by mouth Daily., Disp: 90 tablet, Rfl: 3    sevelamer (RENVELA) 800 MG tablet, 1 tablet 3 (Three) Times a Day. (Patient taking differently: 1 tablet 2 (Two) Times a Day.), Disp: , Rfl:     sodium zirconium cyclosilicate (Lokelma) 5 g packet, Take 5 g by mouth Every Other Day., Disp: 15 each, Rfl: 3    TURMERIC PO, Take  by mouth., Disp: , Rfl:     allopurinol (ZYLOPRIM) 300 MG tablet, Take 1 tablet by mouth Daily. (Patient not taking: Reported on 5/15/2025), Disp: 90 tablet, Rfl: 2    hydrALAZINE (APRESOLINE) 25 MG tablet, Take 1 tablet by mouth 3 (Three) Times a Day As Needed (SBP>160). (Patient not taking: Reported on 5/15/2025), Disp: 90 tablet, Rfl: 0    Omega-3 Fatty Acids (fish oil) 1000 MG capsule capsule, Take 1 capsule by mouth Daily With Breakfast. (Patient not taking: Reported on 6/12/2025), Disp: , Rfl:     QUEtiapine (SEROquel) 25 MG tablet, Take 1 tablet by mouth 2 times a day as needed for agitation and 2 tablets by mouth at bedtime for insomnia (Patient taking differently:  "Take 1 tablet by mouth Daily.), Disp: 120 tablet, Rfl: 2    Syringe/Needle, Disp, (Monoject Magellan Syringe) 23G X 1\" 1 ML misc, Use 1 each as directed For emergency use for hydrocortisone injection.. (Patient not taking: Reported on 6/12/2025), Disp: 10 each, Rfl: 0      ROS:  Cardiac review of systems negative.    Objective     /71   Pulse 100   Ht 162.6 cm (64\")   Wt 68 kg (150 lb)   BMI 25.75 kg/m²       General Appearance:   well developed  well nourished  HENT:   oropharynx moist  lips not cyanotic  Respiratory:  no respiratory distress  normal breath sounds  no rales  Cardiovascular:  no jugular venous distention  regular rhythm  S1 normal, S2 normal  no S3, no S4   no murmur  no rub, no thrill  No carotid bruit  pedal pulses normal  lower extremity edema: none    Musculoskeletal:  no clubbing of fingers.   normocephalic, head atraumatic  Skin:   warm, dry  Psychiatric:  judgement and insight appropriate  normal mood and affect    ECHO:  Results for orders placed during the hospital encounter of 01/11/25    Adult Transthoracic Echo Complete w/ Color, Spectral and Contrast if necessary per protocol    Interpretation Summary    Left ventricular ejection fraction appears to be 61 - 65%.    Left ventricular diastolic function is consistent with (grade I) impaired relaxation.    The left atrial cavity is mildly dilated.    Estimated right ventricular systolic pressure from tricuspid regurgitation is moderately elevated (45-55 mmHg).    STRESS:  Results for orders placed during the hospital encounter of 05/17/24    Stress Test With Myocardial Perfusion One Day    Interpretation Summary  Patient received Lexiscan 0.4 mg IV infusion over 10 seconds.  Baseline ECG shows normal sinus rhythm.  At peak stress, no ischemic ST-T changes were noted.  No significant arrhythmias were seen.  Myocardial perfusion was abnormal demonstrating medium area of severe perfusion defect in the distal inferior/inferoapical " segment with minimal reversibility on resting images, more likely related to attenuation artifact.  A small area of mild to moderate perfusion defect was noted in the distal anterior segment with reversibility on resting images which could represent myocardial ischemia in the right clinical setting.  The left ventricular was normal in size with moderately reduced systolic function.  Calculated LVEF is 36%.  Hypokinesis of the aforementioned segments was noted.  Impressions are consistent with high risk study.  Consider further evaluation if clinically indicated.    CATH:  Results for orders placed during the hospital encounter of 24    Cardiac Catheterization/Vascular Study    Conclusion  [facility]  CARDIAC CATHETERIZATION PROCEDURE REPORT    Patient: Nelson Wang  : 1945  MRN: [id number]    Procedure Date:  24    Referring Physician:  Domingo Courtney MD    Interventional Cardiologist:  Stephan Nichols MD    Indication:  Dyspnea on exertion  Positive stress test with possible anterior ischemia    Clinical Presentation:  Patient is a 79-year-old male with end-stage renal disease who presented with dyspnea on exertion.  He was sent for stress test that showed possible anterior ischemia.    Procedure performed:  Right radial arterial sheath placement  2.   Selective coronary angiography  3.   Left heart catheterization  5.   Moderate conscious sedation  6.   Hemostasis achieved by TR band placement    Procedure Details:  Informed consent was obtained with an explanation of the risks, benefits and alternatives of the procedure. The patient was brought to the Cardiac Catheterization Laboratory in a fasting state.   Pateint was prepped and draped in a standard, sterile fashion. Moderate sedation with Fentanyl and Versed was administered by the circulating nurse. Lidocaine 2% was used to anesthetize the right radial artery and a 5/6 Slender glidesheath was placed.  A 5 F TGR catheter  was then advanced over a 0.035 guidewire into the ascending aorta.  This catheter was used to engage the right and left coronary arteries with diagnostic angiography obtained in all appropriate projections by injection of non-ionic contrast.  TGR catheter was used to cross the aortic valve and enter the left ventricle to measure hemodynamics and perform a left ventriculogram.  The catheter was then removed over a guidewire. The radial artery sheath was removed without difficulty and TR Band was placed over the access site with excellent hemostasis.    Patient tolerated the procedure well without any complications, and transferred to the post procedure area for recovery in a stable condition.      Findings:  1. Coronary Artery Anatomy:  Dominance: Right  Left Main: Moderate size vessel with ostial 10 to 20% stenosis.  Left Anterior Descending: Intermediate size vessel.  There is calcification in the proximal and mid segment.  In the midsegment there is mild diffuse disease in the 20 to 30% range.  A diagonal comes off in the midsegment that has what appears to be ostial 30%.  Circumflex Artery: Small vessel.  Mild luminal irregularities.  Right Coronary Artery: Very large vessel, dominant for the posterior circulation.  There is mild luminal irregularities.  The mid PDA has about 10 to 20% focal stenosis.    2. Hemodynamics:  Left Ventricle: LVEDP 23 mmHg  Aorta: 125/69 mmHg          Cumulative fluoroscopy time: 5.4 minutes    Cumulative air kerma: 365    Total amount of contrast used: 44 mL    Complications:  None.    Estimated Blood Loss:  Minimal.    Conclusions:  Appears mild, nonobstructive coronary artery disease.  LVEDP 23 mmHg.      Recommendations:  Aggressive risk factor reduction and goal-directed medical therapy.          Electronically signed by Stephan Nichols MD, 05/29/24, 9:04 AM EDT.    BMP:     Glucose   Date Value Ref Range Status   06/05/2025 301 (H) 65 - 99 mg/dL Final     BUN   Date Value  Ref Range Status   06/05/2025 43.0 (H) 8.0 - 23.0 mg/dL Final     Creatinine   Date Value Ref Range Status   06/05/2025 5.31 (H) 0.76 - 1.27 mg/dL Final     Sodium   Date Value Ref Range Status   06/05/2025 131 (L) 136 - 145 mmol/L Final     Potassium   Date Value Ref Range Status   06/05/2025 4.3 3.5 - 5.2 mmol/L Final     Chloride   Date Value Ref Range Status   06/05/2025 91 (L) 98 - 107 mmol/L Final     CO2   Date Value Ref Range Status   06/05/2025 25.0 22.0 - 29.0 mmol/L Final     Calcium   Date Value Ref Range Status   06/05/2025 9.7 8.6 - 10.5 mg/dL Final     BUN/Creatinine Ratio   Date Value Ref Range Status   06/05/2025 8.1 7.0 - 25.0 Final     Anion Gap   Date Value Ref Range Status   06/05/2025 15.0 5.0 - 15.0 mmol/L Final     eGFR   Date Value Ref Range Status   06/05/2025 10.3 (L) >60.0 mL/min/1.73 Final     LIPIDS:  Total Cholesterol   Date Value Ref Range Status   01/13/2025 124 0 - 200 mg/dL Final     Triglycerides   Date Value Ref Range Status   01/13/2025 136 0 - 150 mg/dL Final     HDL Cholesterol   Date Value Ref Range Status   01/13/2025 31 (L) 40 - 60 mg/dL Final     LDL Cholesterol    Date Value Ref Range Status   01/13/2025 69 0 - 100 mg/dL Final     VLDL Cholesterol   Date Value Ref Range Status   01/13/2025 24 5 - 40 mg/dL Final     LDL/HDL Ratio   Date Value Ref Range Status   01/13/2025 2.12  Final         Procedures             ASSESSMENT:  Diagnoses and all orders for this visit:    1. Mild CAD (Primary)    2. Paroxysmal A-fib    3. Primary hypertension    4. Mixed hyperlipidemia         PLAN:    1.  Patient describes some symptoms that sound consistent with orthostasis around the time of dialysis.  He does note if he drank some juice during dialysis it did not occur.  We talked about the tight fitting stockings.  He does have midodrine to take before dialysis.  2.  Patient does seem concerned that his heart rates a little bit on the higher end.  I did tell him we could consider adding  "low-dose Toprol but I do think some of his orthostasis could get worse.  The heart is likely speeding up to try to prevent the blood pressure from dropping further.  3.  Patient had echocardiogram 1/13/2025 which showed normal ejection fraction and no significant valvular disease.  He had left heart cath done 5/29/2024 which showed at most 10 to 30% stenosis.  4.  Patient had a monitor on 5/15/2025.  This showed episodes of atrial fibrillation with the longest lasting around 33 minutes.  I did talk to him about a couple trials that if shown treating this \"subclinical atrial fibrillation\" was really not shown to be of any benefit.  I did give him the option of trying Eliquis, being evaluated for watchman as he notes significant bleeding at the time of dialysis, or just continuing to watch.  He is going to think about this.  I did write down the name of the 2 trials.  If he decides on the Eliquis or the watchman he is going to give us a call.  Again after 1 episode of dialysis he had significant bleeding and apparently had a syncopal episode 5/9/2025.      Return in about 6 months (around 12/12/2025).     Patient was given instructions and counseling regarding his condition or for health maintenance advice. Please see specific information pulled into the AVS if appropriate.         Stephan Nichols MD   6/12/2025  13:00 EDT  "

## 2025-06-26 ENCOUNTER — OFFICE VISIT (OUTPATIENT)
Dept: NEUROSURGERY | Facility: CLINIC | Age: 80
End: 2025-06-26
Payer: MEDICARE

## 2025-06-26 ENCOUNTER — LAB (OUTPATIENT)
Dept: LAB | Facility: HOSPITAL | Age: 80
End: 2025-06-26
Payer: MEDICARE

## 2025-06-26 VITALS
BODY MASS INDEX: 26.79 KG/M2 | HEIGHT: 64 IN | DIASTOLIC BLOOD PRESSURE: 45 MMHG | HEART RATE: 84 BPM | SYSTOLIC BLOOD PRESSURE: 129 MMHG | WEIGHT: 156.9 LBS

## 2025-06-26 DIAGNOSIS — S32.030A COMPRESSION FRACTURE OF L3 VERTEBRA, INITIAL ENCOUNTER: ICD-10-CM

## 2025-06-26 DIAGNOSIS — M46.46 DISCITIS OF LUMBAR REGION: Primary | ICD-10-CM

## 2025-06-26 DIAGNOSIS — M46.46 DISCITIS OF LUMBAR REGION: ICD-10-CM

## 2025-06-26 LAB
CRP SERPL-MCNC: 0.35 MG/DL (ref 0–0.5)
ERYTHROCYTE [SEDIMENTATION RATE] IN BLOOD: 26 MM/HR (ref 0–20)

## 2025-06-26 PROCEDURE — 36415 COLL VENOUS BLD VENIPUNCTURE: CPT

## 2025-06-26 PROCEDURE — 85652 RBC SED RATE AUTOMATED: CPT

## 2025-06-26 PROCEDURE — 86140 C-REACTIVE PROTEIN: CPT

## 2025-06-26 RX ORDER — PANTOPRAZOLE SODIUM 20 MG/1
20 TABLET, DELAYED RELEASE ORAL DAILY
COMMUNITY

## 2025-06-26 NOTE — PROGRESS NOTES
Chief Complaint  Spine Injury (Here to establish care for back pain. Patient hospitalized recently w/Osteomyelitis and fungal infection in [place].)    Subjective            Nelson Wang who is a 80 y.o. year old male who presents to [facility] for Evaluation of the Spine.     History of Present Illness  The patient is a 80-year-old male who presents for evaluation of back pain, discitis, osteomyelitis, and compression fractures. He is accompanied by his daughter.    He has a history of compression fractures at T4 and L5, which have since healed. He continues to experience back pain, particularly when walking, but not while sitting. The pain does not radiate down his legs and is rated as 6 to 7 on a scale of 10. Additionally, he reports generalized weakness and abdominal pressure. The use of a back brace provides some relief. He has not undergone a bone density test and reports no current infections. He is currently attending physical therapy three times a week, focusing on strengthening his legs and arms, and plans to request exercises to strengthen his back muscles during his next session. Extending his spine alleviates his pain.    The patient has a long-standing history of psoriasis and was on steroids for approximately 40 years, which led to osteoporosis. He was previously on Skyrizi until about a year ago but is not currently taking any medication for it.    He has a history of discitis and osteomyelitis affecting two vertebrae in the thoracic region and lower spine. He followed up with an infectious disease specialist in [place]. Laboratory studies in 03/2025 showed markedly improved inflammation markers, with C-reactive protein back to normal and sedimentation rate slightly elevated.    He experiences numbness and tingling, which he attributes to his diabetes.    He also reports shortness of breath after eating and abdominal spasms upon standing.    This  "patient  reports that he quit smoking about 25 years ago. His smoking use included cigarettes. He started smoking about 35 years ago. He has a 10 pack-year smoking history. He has been exposed to tobacco smoke. He has never used smokeless tobacco.    Review of Systems   Musculoskeletal:  Positive for back pain.        Objective   Vital Signs:   /45 (BP Location: Right arm, Patient Position: Sitting, Cuff Size: Adult)   Pulse 84   Ht 162.6 cm (64.02\")   Wt 71.2 kg (156 lb 14.4 oz)   BMI 26.92 kg/m²       Physical Exam  Constitutional:       Appearance: He is normal weight.   Neurological:      Mental Status: He is alert.      Sensory: Sensory deficit (sensation symmetric) present.      Motor: No weakness.      Deep Tendon Reflexes: Reflexes normal (No clonus).   Psychiatric:         Mood and Affect: Mood normal.        Neurological Exam  Mental Status  Alert.      Result Review :   I personally interpreted the patient's MRI scan with the patient.     Results  Labs   - C-reactive protein: 03/2025, Normal   - Sed rate: 03/2025, Slightly elevated    Imaging   - MRI of thoracic spine: 04/21/2025, Stable and healed retropulsion at T5 and T6, no active signs of discitis   - MRI of lower back: A little bowing at L2-3 and a little signal in the disk at L2-3        Assessment and Plan    Diagnoses and all orders for this visit:    1. Discitis of lumbar region (Primary)    2. Compression fracture of L3 vertebra, initial encounter      Assessment & Plan  1. Back Pain  Back pain is likely due to residual discitis and degenerative arthritis. The absence of significant spinal stenosis is noted. The patient is advised to continue wearing his back brace for comfort but should avoid constant use to prevent muscle weakening. Core strengthening exercises are recommended, with a gradual increase in walking distance each day. A DEXA scan is suggested to assess bone density, given the history of steroid use and smoking. " Sedimentation rate and C-reactive protein tests will be ordered today. If these markers are significantly elevated, a referral to an infectious disease specialist will be considered.    2. Discitis  Discitis was present in the lumbar region. Previous laboratory studies from 03/2025 showed normal C-reactive protein levels and a slightly elevated sedimentation rate. The MRI from 04/2025 indicates that the discitis has mostly healed, but there is a possibility of residual discitis causing pain. Sedimentation rate and C-reactive protein tests will be ordered today to ensure there is no active discitis. If these markers are significantly elevated, a referral to an infectious disease specialist will be considered.    3. Osteomyelitis  Osteomyelitis was present at L2-3 and L3-4. The MRI from 04/2025 shows that the condition has mostly healed. No further treatment is required at this time unless new symptoms arise or inflammatory markers increase.    4. Neuropathy  Neuropathy is likely related to diabetes. No new treatment is initiated for neuropathy at this time.    5. Abdominal Pressure  Abdominal pressure and shortness of breath after eating are reported. This is not attributed to spinal issues or vagus nerve compression. Further evaluation may be needed if symptoms persist.     Follow Up   No follow-ups on file.  Patient was given instructions and counseling regarding his condition or for health maintenance advice. Please see specific information pulled into the AVS if appropriate.     Patient or patient representative verbalized consent for the use of Ambient Listening during the visit with  Benson Ochoa MD for chart documentation. 6/26/2025  09:56 EDT

## 2025-06-27 ENCOUNTER — RESULTS FOLLOW-UP (OUTPATIENT)
Dept: NEUROSURGERY | Facility: CLINIC | Age: 80
End: 2025-06-27
Payer: MEDICARE

## 2025-06-27 ENCOUNTER — TELEPHONE (OUTPATIENT)
Dept: INTERNAL MEDICINE | Facility: CLINIC | Age: 80
End: 2025-06-27
Payer: MEDICARE

## 2025-06-27 DIAGNOSIS — M54.50 CHRONIC BILATERAL LOW BACK PAIN WITHOUT SCIATICA: Primary | ICD-10-CM

## 2025-06-27 DIAGNOSIS — G89.29 CHRONIC BILATERAL LOW BACK PAIN WITHOUT SCIATICA: Primary | ICD-10-CM

## 2025-06-27 NOTE — PROGRESS NOTES
ESR improved from 3 months ago (29 to 26, minimally elevated above normal). CRP also improved (and in the normal range). These labs would not be consistent with active discitis.

## 2025-06-27 NOTE — TELEPHONE ENCOUNTER
Caller: MARCUS CACERES    Relationship: Emergency Contact    Best call back number: [phone]     What orders are you requesting (i.e. lab or imaging):     PHYSICAL THERAPY  FOR BACK SPECIFICALLY  (PATIENT CURRENTLY SEEING [facility] FOR STRENGTH TRAINING, PER DR. ANDREWS (NEUROSURGERY) ADVISED CALLER TO CONTACT PRIMARY TO EXPAND THERAPY FOR BACK)    DEXA BONE SCAN  SCHEDULE AT [facility]    In what timeframe would the patient need to come in: ASAP    Additional notes:     HUB UPDATED REGISTRATION.    CONTACT CALLER TO SCHEDULE.

## 2025-07-05 DIAGNOSIS — I10 PRIMARY HYPERTENSION: ICD-10-CM

## 2025-07-07 RX ORDER — PANTOPRAZOLE SODIUM 20 MG/1
20 TABLET, DELAYED RELEASE ORAL DAILY
Qty: 90 TABLET | Refills: 3 | Status: SHIPPED | OUTPATIENT
Start: 2025-07-07

## 2025-07-07 RX ORDER — HYDRALAZINE HYDROCHLORIDE 25 MG/1
25 TABLET, FILM COATED ORAL 3 TIMES DAILY PRN
Qty: 90 TABLET | Refills: 0 | Status: SHIPPED | OUTPATIENT
Start: 2025-07-07

## 2025-07-09 ENCOUNTER — DOCUMENTATION (OUTPATIENT)
Dept: INTERNAL MEDICINE | Facility: CLINIC | Age: 80
End: 2025-07-09
Payer: MEDICARE

## 2025-07-24 ENCOUNTER — HOSPITAL ENCOUNTER (OUTPATIENT)
Dept: BONE DENSITY | Facility: HOSPITAL | Age: 80
Discharge: HOME | End: 2025-07-24
Admitting: INTERNAL MEDICINE
Payer: MEDICARE

## 2025-07-24 DIAGNOSIS — Z78.0 POSTMENOPAUSAL: ICD-10-CM

## 2025-07-24 PROCEDURE — 77080 DXA BONE DENSITY AXIAL: CPT

## 2025-07-25 DIAGNOSIS — Z13.820 SCREENING FOR OSTEOPOROSIS: Primary | ICD-10-CM

## 2025-08-01 RX ORDER — ATORVASTATIN CALCIUM 40 MG/1
40 TABLET, FILM COATED ORAL DAILY
Qty: 90 TABLET | Refills: 1 | Status: SHIPPED | OUTPATIENT
Start: 2025-08-01

## 2025-08-07 ENCOUNTER — OFFICE VISIT (OUTPATIENT)
Dept: INTERNAL MEDICINE | Facility: CLINIC | Age: 80
End: 2025-08-07
Payer: MEDICARE

## 2025-08-07 VITALS
DIASTOLIC BLOOD PRESSURE: 63 MMHG | OXYGEN SATURATION: 92 % | TEMPERATURE: 98.2 F | HEART RATE: 91 BPM | BODY MASS INDEX: 26.6 KG/M2 | SYSTOLIC BLOOD PRESSURE: 146 MMHG | HEIGHT: 64 IN | WEIGHT: 155.8 LBS

## 2025-08-07 DIAGNOSIS — I48.0 PAROXYSMAL A-FIB: ICD-10-CM

## 2025-08-07 DIAGNOSIS — Z23 NEED FOR PNEUMOCOCCAL VACCINATION: ICD-10-CM

## 2025-08-07 DIAGNOSIS — E11.22 TYPE 2 DIABETES MELLITUS WITH CHRONIC KIDNEY DISEASE ON CHRONIC DIALYSIS, WITHOUT LONG-TERM CURRENT USE OF INSULIN: ICD-10-CM

## 2025-08-07 DIAGNOSIS — J01.00 ACUTE NON-RECURRENT MAXILLARY SINUSITIS: ICD-10-CM

## 2025-08-07 DIAGNOSIS — N18.6 TYPE 2 DIABETES MELLITUS WITH CHRONIC KIDNEY DISEASE ON CHRONIC DIALYSIS, WITHOUT LONG-TERM CURRENT USE OF INSULIN: ICD-10-CM

## 2025-08-07 DIAGNOSIS — I10 ESSENTIAL HYPERTENSION: Primary | ICD-10-CM

## 2025-08-07 DIAGNOSIS — Z99.2 TYPE 2 DIABETES MELLITUS WITH CHRONIC KIDNEY DISEASE ON CHRONIC DIALYSIS, WITHOUT LONG-TERM CURRENT USE OF INSULIN: ICD-10-CM

## 2025-08-07 DIAGNOSIS — Z23 NEED FOR COVID-19 VACCINE: ICD-10-CM

## 2025-08-07 DIAGNOSIS — E27.40 ADRENAL INSUFFICIENCY: ICD-10-CM

## 2025-08-07 DIAGNOSIS — M81.0 AGE-RELATED OSTEOPOROSIS WITHOUT CURRENT PATHOLOGICAL FRACTURE: ICD-10-CM

## 2025-08-07 DIAGNOSIS — N18.5 STAGE 5 CHRONIC KIDNEY DISEASE: ICD-10-CM

## 2025-08-07 PROBLEM — J96.11 CHRONIC RESPIRATORY FAILURE WITH HYPOXIA: Status: RESOLVED | Noted: 2024-04-09 | Resolved: 2025-08-07

## 2025-08-07 PROBLEM — J69.0 ASPIRATION PNEUMONITIS: Status: RESOLVED | Noted: 2024-02-28 | Resolved: 2025-08-07

## 2025-08-07 PROBLEM — J69.0 ASPIRATION PNEUMONIA: Status: RESOLVED | Noted: 2024-11-01 | Resolved: 2025-08-07

## 2025-08-07 RX ORDER — ASPIRIN 81 MG/1
81 TABLET ORAL DAILY
COMMUNITY

## 2025-08-07 RX ORDER — DOXYCYCLINE 100 MG/1
100 CAPSULE ORAL 2 TIMES DAILY
Qty: 20 CAPSULE | Refills: 0 | Status: SHIPPED | OUTPATIENT
Start: 2025-08-07 | End: 2025-08-17

## 2025-08-11 RX ORDER — SEVELAMER CARBONATE 800 MG/1
800 TABLET, FILM COATED ORAL
Qty: 270 TABLET | Refills: 3 | Status: SHIPPED | OUTPATIENT
Start: 2025-08-11

## 2025-08-14 ENCOUNTER — OFFICE VISIT (OUTPATIENT)
Dept: GASTROENTEROLOGY | Facility: CLINIC | Age: 80
End: 2025-08-14
Payer: MEDICARE

## 2025-08-14 VITALS
SYSTOLIC BLOOD PRESSURE: 139 MMHG | HEART RATE: 94 BPM | BODY MASS INDEX: 26.7 KG/M2 | DIASTOLIC BLOOD PRESSURE: 62 MMHG | HEIGHT: 64 IN | WEIGHT: 156.4 LBS | OXYGEN SATURATION: 100 %

## 2025-08-14 DIAGNOSIS — R10.9 ABDOMINAL SPASMS: Primary | ICD-10-CM

## 2025-08-14 PROCEDURE — 3075F SYST BP GE 130 - 139MM HG: CPT

## 2025-08-14 PROCEDURE — 1159F MED LIST DOCD IN RCRD: CPT

## 2025-08-14 PROCEDURE — 99213 OFFICE O/P EST LOW 20 MIN: CPT

## 2025-08-14 PROCEDURE — 1160F RVW MEDS BY RX/DR IN RCRD: CPT

## 2025-08-14 PROCEDURE — 3078F DIAST BP <80 MM HG: CPT

## 2025-08-14 RX ORDER — DICYCLOMINE HYDROCHLORIDE 10 MG/1
20 CAPSULE ORAL EVERY 6 HOURS PRN
Qty: 120 CAPSULE | Refills: 1 | Status: SHIPPED | OUTPATIENT
Start: 2025-08-14

## 2025-08-28 ENCOUNTER — OFFICE VISIT (OUTPATIENT)
Dept: PULMONOLOGY | Facility: CLINIC | Age: 80
End: 2025-08-28
Payer: MEDICARE

## 2025-08-28 VITALS
RESPIRATION RATE: 16 BRPM | WEIGHT: 158.8 LBS | HEIGHT: 64 IN | DIASTOLIC BLOOD PRESSURE: 87 MMHG | BODY MASS INDEX: 27.11 KG/M2 | HEART RATE: 85 BPM | SYSTOLIC BLOOD PRESSURE: 175 MMHG | TEMPERATURE: 98.2 F | OXYGEN SATURATION: 95 %

## 2025-08-28 DIAGNOSIS — Z99.2 ESRD ON DIALYSIS: ICD-10-CM

## 2025-08-28 DIAGNOSIS — J90 PLEURAL EFFUSION: ICD-10-CM

## 2025-08-28 DIAGNOSIS — N18.6 ESRD ON DIALYSIS: ICD-10-CM

## 2025-08-28 DIAGNOSIS — R05.1 ACUTE COUGH: Primary | ICD-10-CM

## 2025-08-28 PROCEDURE — 1159F MED LIST DOCD IN RCRD: CPT | Performed by: INTERNAL MEDICINE

## 2025-08-28 PROCEDURE — 3077F SYST BP >= 140 MM HG: CPT | Performed by: INTERNAL MEDICINE

## 2025-08-28 PROCEDURE — 3079F DIAST BP 80-89 MM HG: CPT | Performed by: INTERNAL MEDICINE

## 2025-08-28 PROCEDURE — 99213 OFFICE O/P EST LOW 20 MIN: CPT | Performed by: INTERNAL MEDICINE

## 2025-08-28 PROCEDURE — 1160F RVW MEDS BY RX/DR IN RCRD: CPT | Performed by: INTERNAL MEDICINE

## (undated) DEVICE — GLV SURG BIOGEL LTX PF 6 1/2

## (undated) DEVICE — SUT VIC 0 TN 27IN DYED JTN0G

## (undated) DEVICE — SINGLE USE SUCTION VALVE MAJ-209: Brand: SINGLE USE SUCTION VALVE (STERILE)

## (undated) DEVICE — HARMONIC ACE +7 LAPAROSCOPIC SHEARS ADVANCED HEMOSTASIS 5MM DIAMETER 36CM SHAFT LENGTH  FOR USE WITH GRAY HAND PIECE ONLY: Brand: HARMONIC ACE

## (undated) DEVICE — KT CLN CLEANOR SCPE

## (undated) DEVICE — DRSNG SURESITE WNDW 2.38X2.75

## (undated) DEVICE — ADHS SKIN SURG TISS VISC PREMIERPRO EXOFIN HI/VISC FAST/DRY

## (undated) DEVICE — SOL IRRG H2O PL/BG 1000ML STRL

## (undated) DEVICE — ARM VASCULAR-LF: Brand: MEDLINE INDUSTRIES, INC.

## (undated) DEVICE — Device

## (undated) DEVICE — SOLIDIFIER LIQLOC PLS 1500CC BT

## (undated) DEVICE — DRSNG SURESITE WNDW 4X4.5

## (undated) DEVICE — ST ACC MICROPUNCTURE STFF .018 ECHO/PLAT/TP 4F/10CM 21G/7CM

## (undated) DEVICE — SUT ETHLN 2/0 PS 18IN 585H

## (undated) DEVICE — ST. SORBAVIEW ULTIMATE IJ SYSTEM A,C: Brand: CENTURION

## (undated) DEVICE — CUP SPECI 4OZ LF STRL

## (undated) DEVICE — 3M™ STERI-STRIP™ COMPOUND BENZOIN TINCTURE 40 BAGS/CARTON 4 CARTONS/CASE C1544: Brand: 3M™ STERI-STRIP™

## (undated) DEVICE — STERILE POLYISOPRENE POWDER-FREE SURGICAL GLOVES: Brand: PROTEXIS

## (undated) DEVICE — ANTIBACTERIAL UNDYED BRAIDED (POLYGLACTIN 910), SYNTHETIC ABSORBABLE SUTURE: Brand: COATED VICRYL

## (undated) DEVICE — BIOPATCH™ ANTIMICROBIAL DRESSING WITH CHLORHEXIDINE GLUCONATE IS A HYDROPHILLIC POLYURETHANE ABSORPTIVE FOAM WITH CHLORHEXIDINE GLUCONATE (CHG) WHICH INHIBITS BACTERIAL GROWTH UNDER THE DRESSING. THE DRESSING IS INTENDED TO BE USED TO ABSORB EXUDATE, COVER A WOUND CAUSED BY VASCULAR AND NONVASCULAR PERCUTANEOUS MEDICAL DEVICES DURING SURGERY, AS WELL AS REDUCE LOCAL INFECTION AND COLONIZATION OF MICROORGANISMS.: Brand: BIOPATCH

## (undated) DEVICE — CVR PROB 96IN LF STRL

## (undated) DEVICE — TBG PENCL TELESCP MEGADYNE SMOKE EVAC 10FT

## (undated) DEVICE — BNDR ABD PREMIUM/UNIV 10IN 27TO48IN

## (undated) DEVICE — GLV SURG SENSICARE PI ORTHO SZ7.5 LF STRL

## (undated) DEVICE — KT CATH TAL PALINDROME SAPPHIRE 14.5F23CM

## (undated) DEVICE — SUT PROLN 7/0 BV1 D/A 24IN 8702H

## (undated) DEVICE — ENDOPATH XCEL BLADELESS TROCARS WITH STABILITY SLEEVES: Brand: ENDOPATH XCEL

## (undated) DEVICE — DECANTER BAG 9": Brand: MEDLINE INDUSTRIES, INC.

## (undated) DEVICE — LOU LAP CHOLE: Brand: MEDLINE INDUSTRIES, INC.

## (undated) DEVICE — SUT PROLN 6/0 C1 D/A 30IN 8706H

## (undated) DEVICE — DEV TUNNELING SUBCONTANIOUS

## (undated) DEVICE — ACUTE AND CHRONIC CATHETER REPAIR KIT,PALINDROME CHRONIC CARE AND ALL MAHURKAR ACUTE AND CHRONIC CARE CATHETERS

## (undated) DEVICE — SLV SCD KN/LEN ADJ EXPRSS BLENDED MD 1P/U

## (undated) DEVICE — TBG INSUFFLATION LUER LOCK: Brand: MEDLINE INDUSTRIES, INC.

## (undated) DEVICE — SUT MNCRYL PLS ANTIB UD 4/0 PS2 18IN

## (undated) DEVICE — HYPODERMIC SAFETY NEEDLE: Brand: MONOJECT

## (undated) DEVICE — DEV ATOMIZATION MUCOSAL/NASALTRACH

## (undated) DEVICE — SOL NS 500ML

## (undated) DEVICE — APPL CHLORAPREP HI/LITE 26ML ORNG

## (undated) DEVICE — CONN JET HYDRA H20 AUXILIARY DISP

## (undated) DEVICE — ENDOCUT SCISSOR TIP, DISPOSABLE: Brand: RENEW

## (undated) DEVICE — LINER SURG CANSTR SXN S/RIGD 1500CC

## (undated) DEVICE — LOU MINOR PROCEDURE: Brand: MEDLINE INDUSTRIES, INC.

## (undated) DEVICE — RADIFOCUS OPTITORQUE ANGIOGRAPHIC CATHETER: Brand: OPTITORQUE

## (undated) DEVICE — SYR LUERLOK 20CC BX/50

## (undated) DEVICE — BLCK/BITE BLOX WO/DENTL/RIM W/STRAP 54F

## (undated) DEVICE — GW FC FLOP/TP .035 260CM 3MM

## (undated) DEVICE — STPLR SKIN VISISTAT WD 35CT

## (undated) DEVICE — STRIP,CLOSURE,WOUND,MEDI-STRIP,1/2X4: Brand: MEDLINE

## (undated) DEVICE — SINGLE USE BIOPSY VALVE MAJ-210: Brand: SINGLE USE BIOPSY VALVE (STERILE)

## (undated) DEVICE — ENDOPATH XCEL UNIVERSAL TROCAR STABLILITY SLEEVES: Brand: ENDOPATH XCEL

## (undated) DEVICE — LAPAROSCOPIC SMOKE FILTRATION SYSTEM: Brand: PALL LAPAROSHIELD® PLUS LAPAROSCOPIC SMOKE FILTRATION SYSTEM

## (undated) DEVICE — ST IRR CYSTO W/SPK 77IN LF

## (undated) DEVICE — PENCL SMOKE/EVAC MEGADYNE TELESCP 10FT

## (undated) DEVICE — DISPOSABLE MONOPOLAR ENDOSCOPIC CORD 10 FT. (3M): Brand: KIRWAN

## (undated) DEVICE — TR BAND RADIAL ARTERY COMPRESSION DEVICE: Brand: TR BAND

## (undated) DEVICE — NDL HYPO PRECISIONGLIDE REG 25G 1 1/2

## (undated) DEVICE — CATH LAB PACK: Brand: MEDLINE INDUSTRIES, INC.

## (undated) DEVICE — PERITONEAL DIALYSIS ACCESSORY,BLUNT TUNNELING STYLET: Brand: ARGYLE

## (undated) DEVICE — SUT VIC 3/0 SH 27IN J416H

## (undated) DEVICE — TROCAR SITE CLOSURE DEVICE: Brand: ENDO CLOSE

## (undated) DEVICE — SUT SILK 2/0 TIES 18IN A185H

## (undated) DEVICE — LAPAROVUE VISIBILITY SYSTEM LAPAROSCOPIC SOLUTIONS: Brand: LAPAROVUE

## (undated) DEVICE — GLV SURG BIOGEL LTX PF 7 1/2

## (undated) DEVICE — SYR LUERLOK 5CC

## (undated) DEVICE — KT ANTI FOG W/FLD AND SPNG

## (undated) DEVICE — PATIENT RETURN ELECTRODE, SINGLE-USE, CONTACT QUALITY MONITORING, ADULT, WITH 9FT CORD, FOR PATIENTS WEIGING OVER 33LBS. (15KG): Brand: MEGADYNE

## (undated) DEVICE — SUT PROLN 0 CT1 30IN 8424H

## (undated) DEVICE — ELECTRD BLD EZ CLN MOD XLNG 2.75IN

## (undated) DEVICE — SUT SILK 3/0 TIES 18IN A184H

## (undated) DEVICE — SOL NACL 0.9PCT 1000ML

## (undated) DEVICE — TRAP FLD MINIVAC MEGADYNE 100ML

## (undated) DEVICE — CONTAINER,SPEC,PNEUM TUBE,4OZ,STRL PATH: Brand: MEDLINE

## (undated) DEVICE — SYR LUERLOK 30CC

## (undated) DEVICE — GLV SURG SENSICARE PI ORTHO SZ6.5 LF STRL

## (undated) DEVICE — SINGLE-USE BIOPSY FORCEPS: Brand: RADIAL JAW 4

## (undated) DEVICE — Device: Brand: DEFENDO AIR/WATER/SUCTION AND BIOPSY VALVE

## (undated) DEVICE — GLIDESHEATH SLENDER STAINLESS STEEL KIT: Brand: GLIDESHEATH SLENDER